# Patient Record
Sex: MALE | Race: WHITE | NOT HISPANIC OR LATINO | Employment: OTHER | URBAN - METROPOLITAN AREA
[De-identification: names, ages, dates, MRNs, and addresses within clinical notes are randomized per-mention and may not be internally consistent; named-entity substitution may affect disease eponyms.]

---

## 2017-01-14 PROBLEM — I83.009 VENOUS STASIS ULCER OF LOWER EXTREMITY (HCC): Status: ACTIVE | Noted: 2017-01-14

## 2017-01-14 PROBLEM — I10 HTN (HYPERTENSION): Status: ACTIVE | Noted: 2017-01-14

## 2017-01-14 PROBLEM — L97.909 VENOUS STASIS ULCER OF LOWER EXTREMITY (HCC): Status: ACTIVE | Noted: 2017-01-14

## 2017-08-11 PROBLEM — I83.029 VENOUS STASIS ULCER OF LEFT LOWER EXTREMITY (HCC): Status: ACTIVE | Noted: 2017-08-11

## 2017-08-11 PROBLEM — L97.909 VENOUS STASIS ULCER OF LOWER EXTREMITY (HCC): Status: RESOLVED | Noted: 2017-01-14 | Resolved: 2017-08-11

## 2017-08-11 PROBLEM — I83.009 VENOUS STASIS ULCER OF LOWER EXTREMITY (HCC): Status: RESOLVED | Noted: 2017-01-14 | Resolved: 2017-08-11

## 2017-08-11 PROBLEM — L97.929 VENOUS STASIS ULCER OF LEFT LOWER EXTREMITY (HCC): Status: ACTIVE | Noted: 2017-08-11

## 2017-11-09 PROBLEM — I70.202 ATHEROSCLEROSIS OF NATIVE ARTERY OF LEFT LOWER EXTREMITY (HCC): Status: ACTIVE | Noted: 2017-11-09

## 2017-11-16 PROBLEM — I50.9 CHRONIC CONGESTIVE HEART FAILURE (HCC): Status: ACTIVE | Noted: 2017-11-16

## 2017-11-16 PROBLEM — R91.8 MASS OF UPPER LOBE OF RIGHT LUNG: Status: ACTIVE | Noted: 2017-11-16

## 2017-12-15 PROBLEM — I51.7 LEFT VENTRICULAR HYPERTROPHY: Status: ACTIVE | Noted: 2017-12-15

## 2017-12-15 PROBLEM — R91.8 MASS OF UPPER LOBE OF RIGHT LUNG: Status: RESOLVED | Noted: 2017-11-16 | Resolved: 2017-12-15

## 2018-02-28 ENCOUNTER — NON-PROVIDER VISIT (OUTPATIENT)
Dept: WOUND CARE | Facility: MEDICAL CENTER | Age: 63
End: 2018-02-28
Attending: NURSE PRACTITIONER
Payer: MEDICAID

## 2018-02-28 PROCEDURE — 99212 OFFICE O/P EST SF 10 MIN: CPT

## 2018-02-28 PROCEDURE — 97597 DBRDMT OPN WND 1ST 20 CM/<: CPT
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
## 2018-03-07 ENCOUNTER — OFFICE VISIT (OUTPATIENT)
Dept: MEDICAL GROUP | Facility: MEDICAL CENTER | Age: 63
End: 2018-03-07
Attending: INTERNAL MEDICINE
Payer: MEDICAID

## 2018-03-07 ENCOUNTER — NON-PROVIDER VISIT (OUTPATIENT)
Dept: WOUND CARE | Facility: MEDICAL CENTER | Age: 63
End: 2018-03-07
Attending: NURSE PRACTITIONER
Payer: MEDICAID

## 2018-03-07 VITALS
BODY MASS INDEX: 25.06 KG/M2 | WEIGHT: 185 LBS | SYSTOLIC BLOOD PRESSURE: 140 MMHG | HEIGHT: 72 IN | RESPIRATION RATE: 16 BRPM | DIASTOLIC BLOOD PRESSURE: 98 MMHG | OXYGEN SATURATION: 98 % | HEART RATE: 64 BPM | TEMPERATURE: 97.9 F

## 2018-03-07 DIAGNOSIS — I83.029 VENOUS STASIS ULCER OF LEFT LOWER EXTREMITY (HCC): ICD-10-CM

## 2018-03-07 DIAGNOSIS — I07.1 TRICUSPID VALVE INSUFFICIENCY, UNSPECIFIED ETIOLOGY: ICD-10-CM

## 2018-03-07 DIAGNOSIS — I50.9 CHRONIC CONGESTIVE HEART FAILURE, UNSPECIFIED CONGESTIVE HEART FAILURE TYPE: ICD-10-CM

## 2018-03-07 DIAGNOSIS — L97.929 VENOUS STASIS ULCER OF LEFT LOWER EXTREMITY (HCC): ICD-10-CM

## 2018-03-07 DIAGNOSIS — I10 ESSENTIAL HYPERTENSION: ICD-10-CM

## 2018-03-07 DIAGNOSIS — F41.9 ANXIETY: ICD-10-CM

## 2018-03-07 DIAGNOSIS — F19.10 POLYSUBSTANCE ABUSE (HCC): ICD-10-CM

## 2018-03-07 DIAGNOSIS — I27.20 PULMONARY HYPERTENSION (HCC): ICD-10-CM

## 2018-03-07 DIAGNOSIS — M79.672 LEFT FOOT PAIN: ICD-10-CM

## 2018-03-07 PROBLEM — N18.2 CKD (CHRONIC KIDNEY DISEASE) STAGE 2, GFR 60-89 ML/MIN: Status: ACTIVE | Noted: 2018-03-07

## 2018-03-07 PROCEDURE — 99204 OFFICE O/P NEW MOD 45 MIN: CPT | Performed by: INTERNAL MEDICINE

## 2018-03-07 PROCEDURE — 97597 DBRDMT OPN WND 1ST 20 CM/<: CPT

## 2018-03-07 RX ORDER — GABAPENTIN 300 MG/1
CAPSULE ORAL
Qty: 60 CAP | Refills: 1 | Status: SHIPPED | OUTPATIENT
Start: 2018-03-07 | End: 2018-04-18 | Stop reason: SDUPTHER

## 2018-03-07 RX ORDER — CITALOPRAM 20 MG/1
TABLET ORAL
Qty: 30 TAB | Refills: 1 | Status: SHIPPED | OUTPATIENT
Start: 2018-03-07 | End: 2018-06-18 | Stop reason: SDUPTHER

## 2018-03-07 ASSESSMENT — PAIN SCALES - GENERAL: PAINLEVEL: 7=MODERATE-SEVERE PAIN
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
## 2018-03-07 NOTE — ASSESSMENT & PLAN NOTE
Reports a long history of anxiety.  Per his daughter, he gets stress over everything.  He has tried a medication the past which was helpful but he has no idea which one.  Per chart review, there is a mention of him being on celexa.  This name is not familiar to him.  He denies panic attacks.

## 2018-03-08 PROBLEM — F19.10 POLYSUBSTANCE ABUSE (HCC): Status: ACTIVE | Noted: 2018-03-08

## 2018-03-08 NOTE — ASSESSMENT & PLAN NOTE
History of alcoholism, methamphetamine use, marijuana use, long term opiate use.  Patient admits to drinking a 6 pack of beer a week as well as near daily marijuana use.  Methamphetamine history discovered on chart review.

## 2018-03-08 NOTE — ASSESSMENT & PLAN NOTE
Has had since 2016 per chart review.  Improving with wound care.  Has been able to establish with the [facility] wound care clinic in [place].  Being treated with Anya carpio.  Patient reports this is the best it has looked in a long time.  Denies associated pain.

## 2018-03-08 NOTE — ASSESSMENT & PLAN NOTE
"Reports pain over the center of the left heel when he bears weight but not at rest, describes as sharp shooting pain.  Has been going on for several months.  Believes he has \"a piece of glass\" stuck in his foot.  Per review of wound care notes, appears that they did make an incision over this area to try to find any foreign body but nothing was there (done 12/14/17).  Incision has subsequently healed well.  Patient has been on narcotics since 2016 and is requesting a refill today.  Has never tried gabapentin.  "

## 2018-03-08 NOTE — PROGRESS NOTES
"Goran Chavez is a 62 y.o. male here for venous stasis ulers, hypertension, anxiety, foot pain, establish care    HPI:  Comes today with his daughter.  Patient is a poor historian.  Anxiety  Reports a long history of anxiety.  Per his daughter, he gets stress over everything.  He has tried a medication the past which was helpful but he has no idea which one.  Per chart review, there is a mention of him being on celexa.  This name is not familiar to him.  He denies panic attacks.      HTN (hypertension)  Onset: many years ago  Current treatment: metoprolol 50 mg BID, HCTZ 25 mg daily  Treatments tried: patient not sure  Home blood pressure monitoring: no  Associated symptoms: Denies headache, chest pain, vision changes, palpitations  Blood pressure currently elevated at 140/98, patient with history of poor compliance with medications      Venous stasis ulcer of left lower extremity (CMS-HCC)  Has had since 2016 per chart review.  Improving with wound care.  Has been able to establish with the [facility] wound care clinic in [place].  Being treated with Anya carpio.  Patient reports this is the best it has looked in a long time.  Denies associated pain.    Left foot pain  Reports pain over the center of the left heel when he bears weight but not at rest, describes as sharp shooting pain.  Has been going on for several months.  Believes he has \"a piece of glass\" stuck in his foot.  Per review of wound care notes, appears that they did make an incision over this area to try to find any foreign body but nothing was there (done 12/14/17).  Incision has subsequently healed well.  Patient has been on narcotics since 2016 and is requesting a refill today.  Has never tried gabapentin.    Chronic congestive heart failure (CMS-HCC)  Most recent echo shows an elevated RVSP at 55-60 with mild to moderate tricuspid regurg.  There is reduced RV function, mild R atrial dilation.  Borderline LVH with a normal ejection fraction.  He " has some lower extremity swelling but denies shortness of breath, orthopnea, PND.  He does not see a cardiologist.    Polysubstance abuse  History of alcoholism, methamphetamine use, marijuana use, long term opiate use.  Patient admits to drinking a 6 pack of beer a week as well as near daily marijuana use.  Methamphetamine history discovered on chart review.    Current medicines (including changes today)  Current Outpatient Prescriptions   Medication Sig Dispense Refill   • citalopram (CELEXA) 20 MG Tab Take 1/2 tab daily for 1 week then increase to 1 full tab daily 30 Tab 1   • gabapentin (NEURONTIN) 300 MG Cap Take 1 tab by mouth at night for 1 week then increase to 1 tab twice daily if tolerated 60 Cap 1   • atorvastatin (LIPITOR) 40 MG Tab Take 1 Tab by mouth every bedtime. 90 Tab 3   • hydrochlorothiazide (HYDRODIURIL) 25 MG Tab Take 1 Tab by mouth every day. 90 Tab 3   • metoprolol (LOPRESSOR) 50 MG Tab Take 1 Tab by mouth 2 times a day. 180 Tab 3   • Skin Protectants, Misc. (EUCERIN) Cream Apply  to affected area(s) as needed (dry skin).       No current facility-administered medications for this visit.      He  has a past medical history of Anxiety; Charcot's joint of left foot, non-diabetic (3/21/2016); Chronic congestive heart failure (CMS-HCC) (11/16/2017); Hypertension; Migraine; Polysubstance abuse (3/8/2018); Tobacco use (4/18/2016); Ulcer of left lower extremity with necrosis of muscle (CMS-HCC) (3/21/2016); and Venous stasis ulcer (CMS-HCC) (2017).  He  has a past surgical history that includes pr drain skin abscess simple (Left); tibia orif (Right, 1997); shoulder orif (Left, 1997); and hand surgery (Left).  Social History   Substance Use Topics   • Smoking status: Current Every Day Smoker     Packs/day: 0.25     Years: 48.00     Types: Cigarettes   • Smokeless tobacco: Never Used      Comment: 2 packs a week.   • Alcohol use 3.6 oz/week     6 Cans of beer per week     Social History     Social  "History Narrative   • No narrative on file     Family History   Problem Relation Age of Onset   • Lung Disease Mother    • Hypertension Mother    • Cancer Neg Hx    • Heart Disease Neg Hx    • Stroke Neg Hx    • Diabetes Neg Hx          ROS  As above in HPI  All other systems reviewed and are negative     Objective:     Blood pressure 140/98, pulse 64, temperature 36.6 °C (97.9 °F), resp. rate 16, height 1.829 m (6' 0.01\"), weight 83.9 kg (185 lb), SpO2 98 %. Body mass index is 25.08 kg/m².  Physical Exam:    Constitutional: Alert, no distress.  Skin: Warm, dry, good turgor, hyperpigmentation of bilateral feet, shallow venous stasis ulcer over medial ankle on left that is nearly healed, no rash or lesions over left heel.  Eye: Equal, round and reactive, conjunctiva clear, lids normal.  ENMT: Lips without lesions, poor dentition, oropharynx clear, TM's clear bilaterally.  Neck: Trachea midline, no masses, no thyromegaly. No cervical or supraclavicular lymphadenopathy.  Respiratory: Unlabored respiratory effort, lungs clear to auscultation, minimal wheezing, no ronchi.  Cardiovascular: Regular rate and rhythm, no murmurs appreciated,1-2+right lower extremity pitting edema with shiny skin overlying .  Abdomen: Soft, non-tender, no masses, no hepatosplenomegaly.  Psych: Alert and oriented x3, flat affect, minimally interactive.        Assessment and Plan:   The following treatment plan was discussed    1. Anxiety  Uncontrolled.  Will start on celexa as this looks like what he was previously taking per chart review and he reports good efficacy.  He will follow up in 4 weeks  - citalopram (CELEXA) 20 MG Tab; Take 1/2 tab daily for 1 week then increase to 1 full tab daily  Dispense: 30 Tab; Refill: 1    2. Left foot pain  No obvious foreign body despite patient reported sensation, and wound care was not able to find anything either.  Suspect neuropathic pain.  High risk for opiates, which are inappropriate given current " alcohol and marijuana use.  I have not refilled his oxycodone.  Will start him on gabapentin at night, to increase to BID if tolerated.  Follow up 4 weeks  - gabapentin (NEURONTIN) 300 MG Cap; Take 1 tab by mouth at night for 1 week then increase to 1 tab twice daily if tolerated  Dispense: 60 Cap; Refill: 1    3. Chronic congestive heart failure, unspecified congestive heart failure type (CMS-HCC)  Some lower extremity edema on exam, in light of recent echo and no cardiology follow up, I would like him to be seen for at least an initial evaluation.  He will continue his metoprolol.  - REFERRAL TO CARDIOLOGY    4. Tricuspid valve insufficiency, unspecified etiology  - REFERRAL TO CARDIOLOGY    5. Pulmonary hypertension  - REFERRAL TO CARDIOLOGY    6. Essential hypertension  Uncontrolled today, although question med compliance.  Will cont current therapy, address again at next visit in more detail and adjust meds as needed.  -HCTZ 25 mg daily  -metop 50 mg BID  -f/u 4 weeks    7. Venous stasis ulcer of left lower extremity (CMS-HCC)  Healing well.  Cont wound care.    8. Polysubstance abuse  Poor candidate for opiates.  Will refer to pain management if it becomes necessary in the future.  For now, have not refilled his oxycodone and will try on gabapentin.        Followup: Return in about 4 weeks (around 4/4/2018) for hypertension, anxiety.

## 2018-03-08 NOTE — ASSESSMENT & PLAN NOTE
Onset: many years ago  Current treatment: metoprolol 50 mg BID, HCTZ 25 mg daily  Treatments tried: patient not sure  Home blood pressure monitoring: no  Associated symptoms: Denies headache, chest pain, vision changes, palpitations  Blood pressure currently elevated at 140/98, patient with history of poor compliance with medications

## 2018-03-08 NOTE — ASSESSMENT & PLAN NOTE
Most recent echo shows an elevated RVSP at 55-60 with mild to moderate tricuspid regurg.  There is reduced RV function, mild R atrial dilation.  Borderline LVH with a normal ejection fraction.  He has some lower extremity swelling but denies shortness of breath, orthopnea, PND.  He does not see a cardiologist.

## 2018-03-14 ENCOUNTER — NON-PROVIDER VISIT (OUTPATIENT)
Dept: WOUND CARE | Facility: MEDICAL CENTER | Age: 63
End: 2018-03-14
Attending: NURSE PRACTITIONER
Payer: MEDICAID

## 2018-03-14 PROCEDURE — 29580 STRAPPING UNNA BOOT: CPT

## 2018-03-14 PROCEDURE — 97597 DBRDMT OPN WND 1ST 20 CM/<: CPT
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
## 2018-03-21 ENCOUNTER — NON-PROVIDER VISIT (OUTPATIENT)
Dept: WOUND CARE | Facility: MEDICAL CENTER | Age: 63
End: 2018-03-21
Attending: NURSE PRACTITIONER
Payer: MEDICAID

## 2018-03-21 PROCEDURE — 97597 DBRDMT OPN WND 1ST 20 CM/<: CPT
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
## 2018-03-28 ENCOUNTER — NON-PROVIDER VISIT (OUTPATIENT)
Dept: WOUND CARE | Facility: MEDICAL CENTER | Age: 63
End: 2018-03-28
Attending: NURSE PRACTITIONER
Payer: MEDICAID

## 2018-03-28 PROCEDURE — 97597 DBRDMT OPN WND 1ST 20 CM/<: CPT
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
## 2018-04-04 ENCOUNTER — NON-PROVIDER VISIT (OUTPATIENT)
Dept: WOUND CARE | Facility: MEDICAL CENTER | Age: 63
End: 2018-04-04
Attending: NURSE PRACTITIONER
Payer: MEDICAID

## 2018-04-04 PROCEDURE — 97597 DBRDMT OPN WND 1ST 20 CM/<: CPT
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
## 2018-04-11 ENCOUNTER — NON-PROVIDER VISIT (OUTPATIENT)
Dept: WOUND CARE | Facility: MEDICAL CENTER | Age: 63
End: 2018-04-11
Attending: NURSE PRACTITIONER
Payer: MEDICAID

## 2018-04-11 PROCEDURE — 97597 DBRDMT OPN WND 1ST 20 CM/<: CPT
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
## 2018-04-18 ENCOUNTER — OFFICE VISIT (OUTPATIENT)
Dept: MEDICAL GROUP | Facility: MEDICAL CENTER | Age: 63
End: 2018-04-18
Attending: INTERNAL MEDICINE
Payer: MEDICAID

## 2018-04-18 ENCOUNTER — NON-PROVIDER VISIT (OUTPATIENT)
Dept: WOUND CARE | Facility: MEDICAL CENTER | Age: 63
End: 2018-04-18
Attending: NURSE PRACTITIONER
Payer: MEDICAID

## 2018-04-18 ENCOUNTER — OFFICE VISIT (OUTPATIENT)
Dept: CARDIOLOGY | Facility: MEDICAL CENTER | Age: 63
End: 2018-04-18
Payer: MEDICAID

## 2018-04-18 VITALS
SYSTOLIC BLOOD PRESSURE: 148 MMHG | HEART RATE: 97 BPM | OXYGEN SATURATION: 96 % | BODY MASS INDEX: 25.47 KG/M2 | WEIGHT: 188 LBS | HEIGHT: 72 IN | DIASTOLIC BLOOD PRESSURE: 96 MMHG

## 2018-04-18 VITALS
DIASTOLIC BLOOD PRESSURE: 100 MMHG | RESPIRATION RATE: 16 BRPM | WEIGHT: 188 LBS | TEMPERATURE: 99.3 F | HEIGHT: 72 IN | BODY MASS INDEX: 25.47 KG/M2 | HEART RATE: 96 BPM | SYSTOLIC BLOOD PRESSURE: 140 MMHG | OXYGEN SATURATION: 96 %

## 2018-04-18 DIAGNOSIS — I36.1 NON-RHEUMATIC TRICUSPID VALVE INSUFFICIENCY: ICD-10-CM

## 2018-04-18 DIAGNOSIS — I27.20 PULMONARY HYPERTENSION (HCC): ICD-10-CM

## 2018-04-18 DIAGNOSIS — R42 DIZZINESS: ICD-10-CM

## 2018-04-18 DIAGNOSIS — I10 ESSENTIAL HYPERTENSION: ICD-10-CM

## 2018-04-18 DIAGNOSIS — R06.09 DYSPNEA ON EXERTION: ICD-10-CM

## 2018-04-18 DIAGNOSIS — M75.81 RIGHT ROTATOR CUFF TENDONITIS: ICD-10-CM

## 2018-04-18 DIAGNOSIS — R04.0 RECURRENT EPISTAXIS: ICD-10-CM

## 2018-04-18 DIAGNOSIS — N18.2 CKD (CHRONIC KIDNEY DISEASE) STAGE 2, GFR 60-89 ML/MIN: ICD-10-CM

## 2018-04-18 DIAGNOSIS — R55 SYNCOPE AND COLLAPSE: ICD-10-CM

## 2018-04-18 DIAGNOSIS — I27.21 SECONDARY PULMONARY ARTERIAL HYPERTENSION (HCC): ICD-10-CM

## 2018-04-18 DIAGNOSIS — I51.7 LEFT VENTRICULAR HYPERTROPHY: ICD-10-CM

## 2018-04-18 DIAGNOSIS — I83.029 VENOUS STASIS ULCER OF LEFT LOWER EXTREMITY (HCC): ICD-10-CM

## 2018-04-18 DIAGNOSIS — Z72.0 TOBACCO USE: ICD-10-CM

## 2018-04-18 DIAGNOSIS — M79.672 LEFT FOOT PAIN: ICD-10-CM

## 2018-04-18 DIAGNOSIS — F41.9 ANXIETY: ICD-10-CM

## 2018-04-18 DIAGNOSIS — L97.929 VENOUS STASIS ULCER OF LEFT LOWER EXTREMITY (HCC): ICD-10-CM

## 2018-04-18 DIAGNOSIS — I10 ESSENTIAL HYPERTENSION, BENIGN: ICD-10-CM

## 2018-04-18 PROBLEM — M75.82 ROTATOR CUFF TENDONITIS, LEFT: Status: ACTIVE | Noted: 2018-04-18

## 2018-04-18 PROCEDURE — 99214 OFFICE O/P EST MOD 30 MIN: CPT | Performed by: INTERNAL MEDICINE

## 2018-04-18 PROCEDURE — 99204 OFFICE O/P NEW MOD 45 MIN: CPT | Mod: 25 | Performed by: INTERNAL MEDICINE

## 2018-04-18 PROCEDURE — 99213 OFFICE O/P EST LOW 20 MIN: CPT | Performed by: INTERNAL MEDICINE

## 2018-04-18 PROCEDURE — 93000 ELECTROCARDIOGRAM COMPLETE: CPT | Performed by: INTERNAL MEDICINE

## 2018-04-18 PROCEDURE — 97597 DBRDMT OPN WND 1ST 20 CM/<: CPT

## 2018-04-18 RX ORDER — GABAPENTIN 300 MG/1
300 CAPSULE ORAL 2 TIMES DAILY
Qty: 60 CAP | Refills: 3 | Status: SHIPPED | OUTPATIENT
Start: 2018-04-18 | End: 2018-12-19 | Stop reason: CLARIF

## 2018-04-18 RX ORDER — PROPRANOLOL HYDROCHLORIDE 20 MG/1
20 TABLET ORAL 2 TIMES DAILY
Qty: 60 TAB | Refills: 3 | Status: SHIPPED | OUTPATIENT
Start: 2018-04-18 | End: 2018-05-30

## 2018-04-18 RX ORDER — AMLODIPINE BESYLATE 5 MG/1
5 TABLET ORAL DAILY
Qty: 30 TAB | Refills: 3 | Status: SHIPPED | OUTPATIENT
Start: 2018-04-18 | End: 2018-10-25 | Stop reason: SDUPTHER

## 2018-04-18 ASSESSMENT — PAIN SCALES - GENERAL: PAINLEVEL: NO PAIN

## 2018-04-18 ASSESSMENT — ENCOUNTER SYMPTOMS: SHORTNESS OF BREATH: 1

## 2018-04-18 NOTE — PROGRESS NOTES
Subjective:   Chief Complaint:   Chief Complaint   Patient presents with   • Hypertension   • Congestive Heart Failure       Goran Chavez is a 62 y.o. male who is referred by Bianka Ch M.D., for an abnormal echocardiogram. He has anxiety with panic attacks. He had an abnormal echocardiogram some elevated right-sided pressure is normal LV systolic function. He has hypertension which has been uncontrolled. He has smoked for 48 years. He drinks alcohol intermittently. 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 He uses marijuana daily. This data was cleaned from the primary care provider's note on March 7, 2018. He has had hypertension for over 20 years.     He rides a bicycle and goes for walks. He sometimes limited by shortness of breath. He does not have mian chest pains. He has very minor lower extremity edema. He has intermittent dizziness but it is not limiting. It does not sound orthostatic. He had one remote episode of syncope with collapse and cannot recall the events surrounding it. I cannot be sure if there is substance use involved at the time.     DATA REVIEWED by me:  ECG April 18, 2018    Echo December 13, 2017   Borderline LVH, EF 57%, mild to moderate TR, RVSP 55-60 mmHg, RV systolic function mildly reduced but normal size, left atrium normal size, mild AI, mild MR.    CT chest is symmetrical 2017  Comments a normal size aorta and heart.      Most recent labs:     November 10, 2017 hemoglobin 14.7, sodium 137, potassium 5.3, creatinine 1.3, LFTs normal,     Past Medical History:   Diagnosis Date   • Anxiety    • Charcot's joint of left foot, non-diabetic 3/21/2016   • Chronic congestive heart failure (CMS-[facility]) 11/16/2017   • Hypertension    • Migraine    • Polysubstance abuse 3/8/2018   • Tobacco use 4/18/2016   • Ulcer of left lower extremity with necrosis of muscle (CMS-[facility]) 3/21/2016    • Venous stasis ulcer (CMS-HCC) 2017    bilateral lower extremity     Past Surgical History:   Procedure Laterality Date   • TIBIA ORIF Right    • SHOULDER ORIF Left    • HAND SURGERY Left     due to infection   • PB DRAIN SKIN ABSCESS SIMPLE Left     leg, near the knee, remote     Family History   Problem Relation Age of Onset   • Lung Disease Mother 70      from COPD   • Hypertension Mother    • Other Father 82      from brain aneurysm after fall   • Cancer Neg Hx    • Heart Disease Neg Hx    • Stroke Neg Hx    • Diabetes Neg Hx      Social History     Social History   • Marital status: Legally      Spouse name: N/A   • Number of children: N/A   • Years of education: N/A     Occupational History   • Not on file.     Social History Main Topics   • Smoking status: Current Every Day Smoker     Packs/day: 0.25     Years: 48.00     Types: Cigarettes   • Smokeless tobacco: Never Used      Comment: 2 packs a week.   • Alcohol use 3.6 oz/week     6 Cans of beer per week   • Drug use: Yes     Types: Marijuana      Comment: MJ every day   • Sexual activity: Not on file     Other Topics Concern   • Not on file     Social History Narrative   • No narrative on file     Allergies   Allergen Reactions   • Norco [Hydrocodone-Acetaminophen] Itching       Current Outpatient Prescriptions   Medication Sig Dispense Refill   • aspirin EC (ECOTRIN) 81 MG Tablet Delayed Response Take 1 Tab by mouth every day. 30 Tab 0   • citalopram (CELEXA) 20 MG Tab Take 1/2 tab daily for 1 week then increase to 1 full tab daily 30 Tab 1   • gabapentin (NEURONTIN) 300 MG Cap Take 1 tab by mouth at night for 1 week then increase to 1 tab twice daily if tolerated 60 Cap 1   • atorvastatin (LIPITOR) 40 MG Tab Take 1 Tab by mouth every bedtime. 90 Tab 3   • hydrochlorothiazide (HYDRODIURIL) 25 MG Tab Take 1 Tab by mouth every day. 90 Tab 3   • metoprolol (LOPRESSOR) 50 MG Tab Take 1 Tab by mouth 2 times a day. 180 Tab 3     No  current facility-administered medications for this visit.        Review of Systems   Respiratory: Positive for shortness of breath.      All others systems reviewed and negative.     Objective:     Blood pressure 148/96, pulse 97, height 1.829 m (6'), weight 85.3 kg (188 lb), SpO2 96 %. Body mass index is 25.5 kg/m².    Physical Exam   General: No acute distress. Well nourished.  HEENT: EOM grossly intact, no scleral icterus, no pharyngeal erythema.   Neck:  No JVD, no bruits, trachea midline  CVS: RRR. Normal S1, S2. No M/R/G. Trace LE edema.  2+ radial pulses, 2+ DT pulses  Resp: CTAB. No wheezing or crackles/rhonchi. Normal respiratory effort.  Abdomen: Soft, NT, no mian hepatomegaly.  MSK/Ext: No clubbing or cyanosis.  Skin: Warm and dry, no rashes.  Neurological: CN III-XII grossly intact. No focal deficits.   Psych: A&O x 3, appropriate affect, good judgement      Assessment:     1. Essential hypertension, benign  EKG    Echocardiogram Comp w/o Cont   2. Secondary pulmonary arterial hypertension  Echocardiogram Comp w/o Cont   3. CKD (chronic kidney disease) stage 2, GFR 60-89 ml/min     4. Non-rheumatic tricuspid valve insufficiency     5. Tobacco use     6. Venous stasis ulcer of left lower extremity (CMS-HCC)     7. Left ventricular hypertrophy     8. Dyspnea on exertion     9. Dizziness     10. Syncope and collapse         Medical Decision Making:  Today's Assessment / Status / Plan:     1. Hypertension with LVH, uncontrolled  2. Moderate to severe secondary pulmonary hypertension  3. Mild to moderate tricuspid regurgitation  4. Tobacco abuse  5 . Polysubstance abuse  6. Probable chronic venous insufficiency  7. Chronic kidney disease stage II    -The best approach is blood pressure control and smoking cessation. No other evaluation at this time.  Repeat echo and return in 1 year.  -He should be on a daily baby aspirin  -Try propranolol in place of metoprolol for anxiety and blood pressure  -add  amlodipine 5 mg dialy.    -If BP not to goal, I would consider carvedilol      Return in about 1 year (around 4/18/2019).    It is my pleasure to participate in the care of Mr. Chavez.  Please do not hesitate to contact me with questions or concerns.    Aziza Saucedo MD, [facility]  Cardiologist [facility]    Please note that this dictation was created using voice recognition software. I have made every reasonable attempt to correct obvious errors, but it is possible there are errors of grammar and possibly content that I did not discover before finalizing the note.

## 2018-04-18 NOTE — LETTER
[facility]   [address]  [place] [address]  Phone: [phone]  Fax: [phone]              Goran Chavez  1955    Encounter Date: 4/18/2018    Aziza Saucedo M.D.          PROGRESS NOTE:  Subjective:   Chief Complaint:   Chief Complaint   Patient presents with   • Hypertension   • Congestive Heart Failure       Goran Chavez is a 62 y.o. male who is referred by Bianka Ch M.D., for an abnormal echocardiogram. He has anxiety with panic attacks. He had an abnormal echocardiogram some elevated right-sided pressure is normal LV systolic function. He has hypertension which has been uncontrolled. He has smoked for 48 years. He drinks alcohol intermittently. 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 He uses marijuana daily. This data was cleaned from the primary care provider's note on March 7, 2018. He has had hypertension for over 20 years.     He rides a bicycle and goes for walks. He sometimes limited by shortness of breath. He does not have mian chest pains. He has very minor lower extremity edema. He has intermittent dizziness but it is not limiting. It does not sound orthostatic. He had one remote episode of syncope with collapse and cannot recall the events surrounding it. I cannot be sure if there is substance use involved at the time.     DATA REVIEWED by me:  ECG April 18, 2018    Echo December 13, 2017   Borderline LVH, EF 57%, mild to moderate TR, RVSP 55-60 mmHg, RV systolic function mildly reduced but normal size, left atrium normal size, mild AI, mild MR.    CT chest is symmetrical 2017  Comments a normal size aorta and heart.      Most recent labs:     November 10, 2017 hemoglobin 14.7, sodium 137, potassium 5.3, creatinine 1.3, LFTs normal,     Past Medical History:   Diagnosis Date   • Anxiety    • Charcot's  joint of left foot, non-diabetic 3/21/2016   • Chronic congestive heart failure (CMS-HCC) 2017   • Hypertension    • Migraine    • Polysubstance abuse 3/8/2018   • Tobacco use 2016   • Ulcer of left lower extremity with necrosis of muscle (CMS-HCC) 3/21/2016   • Venous stasis ulcer (CMS-HCC) 2017    bilateral lower extremity     Past Surgical History:   Procedure Laterality Date   • TIBIA ORIF Right    • SHOULDER ORIF Left    • HAND SURGERY Left     due to infection   • PB DRAIN SKIN ABSCESS SIMPLE Left     leg, near the knee, remote     Family History   Problem Relation Age of Onset   • Lung Disease Mother 70      from COPD   • Hypertension Mother    • Other Father 82      from brain aneurysm after fall   • Cancer Neg Hx    • Heart Disease Neg Hx    • Stroke Neg Hx    • Diabetes Neg Hx      Social History     Social History   • Marital status: Legally      Spouse name: N/A   • Number of children: N/A   • Years of education: N/A     Occupational History   • Not on file.     Social History Main Topics   • Smoking status: Current Every Day Smoker     Packs/day: 0.25     Years: 48.00     Types: Cigarettes   • Smokeless tobacco: Never Used      Comment: 2 packs a week.   • Alcohol use 3.6 oz/week     6 Cans of beer per week   • Drug use: Yes     Types: Marijuana      Comment: MJ every day   • Sexual activity: Not on file     Other Topics Concern   • Not on file     Social History Narrative   • No narrative on file     Allergies   Allergen Reactions   • Norco [Hydrocodone-Acetaminophen] Itching       Current Outpatient Prescriptions   Medication Sig Dispense Refill   • aspirin EC (ECOTRIN) 81 MG Tablet Delayed Response Take 1 Tab by mouth every day. 30 Tab 0   • citalopram (CELEXA) 20 MG Tab Take 1/2 tab daily for 1 week then increase to 1 full tab daily 30 Tab 1   • gabapentin (NEURONTIN) 300 MG Cap Take 1 tab by mouth at night for 1 week then increase to 1 tab twice daily if  tolerated 60 Cap 1   • atorvastatin (LIPITOR) 40 MG Tab Take 1 Tab by mouth every bedtime. 90 Tab 3   • hydrochlorothiazide (HYDRODIURIL) 25 MG Tab Take 1 Tab by mouth every day. 90 Tab 3   • metoprolol (LOPRESSOR) 50 MG Tab Take 1 Tab by mouth 2 times a day. 180 Tab 3     No current facility-administered medications for this visit.        Review of Systems   Respiratory: Positive for shortness of breath.      All others systems reviewed and negative.     Objective:     Blood pressure 148/96, pulse 97, height 1.829 m (6'), weight 85.3 kg (188 lb), SpO2 96 %. Body mass index is 25.5 kg/m².    Physical Exam   General: No acute distress. Well nourished.  HEENT: EOM grossly intact, no scleral icterus, no pharyngeal erythema.   Neck:  No JVD, no bruits, trachea midline  CVS: RRR. Normal S1, S2. No M/R/G. Trace LE edema.  2+ radial pulses, 2+ DT pulses  Resp: CTAB. No wheezing or crackles/rhonchi. Normal respiratory effort.  Abdomen: Soft, NT, no mian hepatomegaly.  MSK/Ext: No clubbing or cyanosis.  Skin: Warm and dry, no rashes.  Neurological: CN III-XII grossly intact. No focal deficits.   Psych: A&O x 3, appropriate affect, good judgement      Assessment:     1. Essential hypertension, benign  EKG    Echocardiogram Comp w/o Cont   2. Secondary pulmonary arterial hypertension  Echocardiogram Comp w/o Cont   3. CKD (chronic kidney disease) stage 2, GFR 60-89 ml/min     4. Non-rheumatic tricuspid valve insufficiency     5. Tobacco use     6. Venous stasis ulcer of left lower extremity (CMS-HCC)     7. Left ventricular hypertrophy     8. Dyspnea on exertion     9. Dizziness     10. Syncope and collapse         Medical Decision Making:  Today's Assessment / Status / Plan:     1. Hypertension with LVH, uncontrolled  2. Moderate to severe secondary pulmonary hypertension  3. Mild to moderate tricuspid regurgitation  4. Tobacco abuse  5 . Polysubstance abuse  6. Probable chronic venous insufficiency  7. Chronic kidney  disease stage II    -The best approach is blood pressure control and smoking cessation. No other evaluation at this time.  Repeat echo and return in 1 year.  -He should be on a daily baby aspirin  -Try propranolol in place of metoprolol for anxiety and blood pressure  -add amlodipine 5 mg dialy.    -If BP not to goal, I would consider carvedilol      Return in about 1 year (around 4/18/2019).    It is my pleasure to participate in the care of Mr. Chavez.  Please do not hesitate to contact me with questions or concerns.    Aziza Saucedo MD, [facility]  Cardiologist [facility]    Please note that this dictation was created using voice recognition software. I have made every reasonable attempt to correct obvious errors, but it is possible there are errors of grammar and possibly content that I did not discover before finalizing the note.      Marcela Ch M.D.  [address] [place] [id number]  VIA In Basket
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
## 2018-04-18 NOTE — PATIENT INSTRUCTIONS
-When you run out of the metoprolol, stop taking it and start propranolol 20 mg TID, helps with blood pressure and anxiety  -start amlodipine 5 mg daily  -Call if you are having any side effects from your medicine  [phone]  -Try to quite smoking everything to help heal the lungs and heart  -Keep your blood pressure under 130/80.  -start aspirin 81 mg daily (baby aspirin)

## 2018-04-19 LAB — EKG IMPRESSION: NORMAL

## 2018-04-19 NOTE — ASSESSMENT & PLAN NOTE
Patient was seen by cardiology today. They recommended repeat echocardiogram in one year and better blood pressure control as well as smoking cessation. They did not have any specific workup or recommendations regarding his elevated RVSP on echo.

## 2018-04-19 NOTE — ASSESSMENT & PLAN NOTE
Currently uncontrolled. Blood pressure in clinic today is 140/100. Patient was seen by cardiology today who recommended adding amlodipine 5 mg daily to his regimen. It does not appear that this medication have been ordered yet by them. He reports continuing on the HCTZ 25 mg daily. He was also taking metoprolol 50 mg twice a day and they recommended switching him to propranolol for the added anxiety benefit.
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
## 2018-04-19 NOTE — ASSESSMENT & PLAN NOTE
Patient was previously on chronic opiates for his foot pain. Last visit, we switched him over to gabapentin which she has been taking in the evenings. He reports improvement in the pain. He has not tried increasing it to twice a day. He is not sure how much drowsiness it causes.

## 2018-04-19 NOTE — ASSESSMENT & PLAN NOTE
Patient reports frequent nosebleeds usually from the left nostril which occur daily. He states they last for several minutes before they stop. Usually happen after he blows his nose. If he applies pressure using a tissue in his nostril, he is usually able to get it to stop

## 2018-04-19 NOTE — ASSESSMENT & PLAN NOTE
Patient states that although his cardiologist recommended smoking cessation, he is not currently ready to do this. He has never tried to stop smoking in the past.

## 2018-04-19 NOTE — ASSESSMENT & PLAN NOTE
Patient reports that he does not notice any improvement in his anxiety since starting on the citalopram. He believes there was a medicine that helped him that he took when he lived in [facility] this started with an aide and was not amitriptyline or Ativan. He plans to get in contact with his old doctor's office to see what this medication was. His cardiologist recommended switching from metoprolol to propranolol to try to help his anxiety symptoms.

## 2018-04-25 ENCOUNTER — NON-PROVIDER VISIT (OUTPATIENT)
Dept: WOUND CARE | Facility: MEDICAL CENTER | Age: 63
End: 2018-04-25
Attending: NURSE PRACTITIONER
Payer: MEDICAID

## 2018-04-25 ENCOUNTER — HOSPITAL ENCOUNTER (OUTPATIENT)
Facility: MEDICAL CENTER | Age: 63
End: 2018-04-25
Attending: NURSE PRACTITIONER
Payer: MEDICAID

## 2018-04-25 DIAGNOSIS — L08.9 WOUND INFECTION: ICD-10-CM

## 2018-04-25 DIAGNOSIS — M79.605 LOWER EXTREMITY PAIN, DIFFUSE, LEFT: ICD-10-CM

## 2018-04-25 DIAGNOSIS — T14.8XXA WOUND INFECTION: ICD-10-CM

## 2018-04-25 LAB
GRAM STN SPEC: NORMAL
SIGNIFICANT IND 70042: NORMAL
SITE SITE: NORMAL
SOURCE SOURCE: NORMAL

## 2018-04-25 PROCEDURE — 97597 DBRDMT OPN WND 1ST 20 CM/<: CPT

## 2018-04-25 PROCEDURE — 87205 SMEAR GRAM STAIN: CPT

## 2018-04-25 PROCEDURE — 87070 CULTURE OTHR SPECIMN AEROBIC: CPT
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
## 2018-04-27 LAB
BACTERIA WND AEROBE CULT: NORMAL
GRAM STN SPEC: NORMAL
SIGNIFICANT IND 70042: NORMAL
SITE SITE: NORMAL
SOURCE SOURCE: NORMAL

## 2018-05-02 ENCOUNTER — NON-PROVIDER VISIT (OUTPATIENT)
Dept: WOUND CARE | Facility: MEDICAL CENTER | Age: 63
End: 2018-05-02
Attending: NURSE PRACTITIONER
Payer: MEDICAID

## 2018-05-02 PROCEDURE — 97597 DBRDMT OPN WND 1ST 20 CM/<: CPT
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
## 2018-05-09 ENCOUNTER — NON-PROVIDER VISIT (OUTPATIENT)
Dept: WOUND CARE | Facility: MEDICAL CENTER | Age: 63
End: 2018-05-09
Attending: NURSE PRACTITIONER
Payer: MEDICAID

## 2018-05-09 PROCEDURE — 97597 DBRDMT OPN WND 1ST 20 CM/<: CPT
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
## 2018-05-16 ENCOUNTER — NON-PROVIDER VISIT (OUTPATIENT)
Dept: WOUND CARE | Facility: MEDICAL CENTER | Age: 63
End: 2018-05-16
Attending: NURSE PRACTITIONER
Payer: MEDICAID

## 2018-05-16 PROCEDURE — 97597 DBRDMT OPN WND 1ST 20 CM/<: CPT
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
## 2018-05-23 ENCOUNTER — NON-PROVIDER VISIT (OUTPATIENT)
Dept: WOUND CARE | Facility: MEDICAL CENTER | Age: 63
End: 2018-05-23
Attending: NURSE PRACTITIONER
Payer: MEDICAID

## 2018-05-23 PROCEDURE — 97597 DBRDMT OPN WND 1ST 20 CM/<: CPT
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
## 2018-05-30 ENCOUNTER — NON-PROVIDER VISIT (OUTPATIENT)
Dept: WOUND CARE | Facility: MEDICAL CENTER | Age: 63
End: 2018-05-30
Attending: NURSE PRACTITIONER
Payer: MEDICAID

## 2018-05-30 ENCOUNTER — OFFICE VISIT (OUTPATIENT)
Dept: MEDICAL GROUP | Facility: MEDICAL CENTER | Age: 63
End: 2018-05-30
Attending: INTERNAL MEDICINE
Payer: MEDICAID

## 2018-05-30 VITALS
SYSTOLIC BLOOD PRESSURE: 98 MMHG | RESPIRATION RATE: 16 BRPM | BODY MASS INDEX: 24.38 KG/M2 | WEIGHT: 180 LBS | TEMPERATURE: 97.9 F | DIASTOLIC BLOOD PRESSURE: 60 MMHG | OXYGEN SATURATION: 95 % | HEIGHT: 72 IN | HEART RATE: 48 BPM

## 2018-05-30 DIAGNOSIS — F41.9 ANXIETY: ICD-10-CM

## 2018-05-30 DIAGNOSIS — I83.029 VENOUS STASIS ULCER OF LEFT LOWER EXTREMITY (HCC): ICD-10-CM

## 2018-05-30 DIAGNOSIS — L97.929 VENOUS STASIS ULCER OF LEFT LOWER EXTREMITY (HCC): ICD-10-CM

## 2018-05-30 DIAGNOSIS — M75.81 RIGHT ROTATOR CUFF TENDONITIS: ICD-10-CM

## 2018-05-30 DIAGNOSIS — R04.0 RECURRENT EPISTAXIS: ICD-10-CM

## 2018-05-30 DIAGNOSIS — I10 ESSENTIAL HYPERTENSION: ICD-10-CM

## 2018-05-30 PROCEDURE — 99214 OFFICE O/P EST MOD 30 MIN: CPT | Performed by: INTERNAL MEDICINE

## 2018-05-30 PROCEDURE — 97597 DBRDMT OPN WND 1ST 20 CM/<: CPT

## 2018-05-30 PROCEDURE — 99213 OFFICE O/P EST LOW 20 MIN: CPT | Performed by: INTERNAL MEDICINE

## 2018-05-30 RX ORDER — PROPRANOLOL HYDROCHLORIDE 10 MG/1
10 TABLET ORAL 2 TIMES DAILY
Qty: 60 TAB | Refills: 3 | Status: SHIPPED | OUTPATIENT
Start: 2018-05-30 | End: 2019-02-19 | Stop reason: SDUPTHER

## 2018-05-30 ASSESSMENT — PAIN SCALES - GENERAL: PAINLEVEL: NO PAIN

## 2018-05-30 NOTE — ASSESSMENT & PLAN NOTE
Patient continues to follow with wound care. One of his wound is completely closed and the other is improving according to their notes and measurements. He has an appointment with them today to seeing them once weekly on Wednesdays.

## 2018-05-30 NOTE — ASSESSMENT & PLAN NOTE
Patient reports improvement in his right shoulder pain after the subacromial bursa injection done 4/18/18. He is interested in getting into some physical therapy as he is noticing the pain is starting to return slowly. He reports improvement in his range of motion after the injection.

## 2018-05-30 NOTE — ASSESSMENT & PLAN NOTE
Continues to complain of recurrent nosebleeds when he blows his nose. It is always on the left side.  Has not blown nose for a few days and has not had any bleeding.  Suggested afrin at last visit which he has used a few times and says is helpful.  Bleeding usually lasts a few min or less and is not run down the back of his throat.

## 2018-05-30 NOTE — ASSESSMENT & PLAN NOTE
Patient reports that his anxiety is improved on the Celexa. Forgot to get it refilled so was off of it for about a week and then took a full dose which made him somewhat drowsy. His daughter was concerned and thought about taking him to the ER but he got better quickly. They talked she had a pharmacist who recommended that when he restarts the medication he start with half a tablet initially for a few days and then work up to a full tablet. It seems that the propranolol has also been helpful for his anxiety.
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
## 2018-05-30 NOTE — PROGRESS NOTES
Subjective:   Goran Chavez is a 62 y.o. male here today for follow up blood pressure, shoulder pain, anxiety, nose bleeds    HTN (hypertension)  Patient presents for follow-up hypertension. At last visit we had switched his metoprolol to propranolol 20 mg twice a day to try to provide blood pressure control as well as help with his anxiety. He also been started on amlodipine 5 mg daily around that time by his cardiologist. Taking all of his medications as prescribed. He denies lightheadedness or dizziness with standing. Blood pressure clinic today 98/60 with a heart rate of 48.  Has not been monitoring blood pressures at home.    Right rotator cuff tendonitis  Patient reports improvement in his right shoulder pain after the subacromial bursa injection done 4/18/18. He is interested in getting into some physical therapy as he is noticing the pain is starting to return slowly. He reports improvement in his range of motion after the injection.    Venous stasis ulcer of left lower extremity (CMS-HCC)  Patient continues to follow with wound care. One of his wound is completely closed and the other is improving according to their notes and measurements. He has an appointment with them today to seeing them once weekly on Wednesdays.    Recurrent epistaxis  Continues to complain of recurrent nosebleeds when he blows his nose. It is always on the left side.  Has not blown nose for a few days and has not had any bleeding.  Suggested afrin at last visit which he has used a few times and says is helpful.  Bleeding usually lasts a few min or less and is not run down the back of his throat.    Anxiety  Patient reports that his anxiety is improved on the Celexa. Forgot to get it refilled so was off of it for about a week and then took a full dose which made him somewhat drowsy. His daughter was concerned and thought about taking him to the ER but he got better quickly. They talked she had a pharmacist who recommended that  "when he restarts the medication he start with half a tablet initially for a few days and then work up to a full tablet. It seems that the propranolol has also been helpful for his anxiety.       Current medicines (including changes today)  Current Outpatient Prescriptions   Medication Sig Dispense Refill   • propranolol (INDERAL) 10 MG Tab Take 1 Tab by mouth 2 times a day. 60 Tab 3   • aspirin EC (ECOTRIN) 81 MG Tablet Delayed Response Take 1 Tab by mouth every day. 30 Tab 11   • amLODIPine (NORVASC) 5 MG Tab Take 1 Tab by mouth every day. 30 Tab 3   • gabapentin (NEURONTIN) 300 MG Cap Take 1 Cap by mouth 2 Times a Day. 60 Cap 3   • citalopram (CELEXA) 20 MG Tab Take 1/2 tab daily for 1 week then increase to 1 full tab daily (Patient taking differently: Take 20 mg by mouth every day. Take 1/2 tab daily for 1 week then increase to 1 full tab daily) 30 Tab 1   • atorvastatin (LIPITOR) 40 MG Tab Take 1 Tab by mouth every bedtime. 90 Tab 3     No current facility-administered medications for this visit.      He  has a past medical history of Anxiety; Charcot's joint of left foot, non-diabetic (3/21/2016); Chronic congestive heart failure (HCC) (11/16/2017); Hypertension; Migraine; Polysubstance abuse (3/8/2018); Tobacco use (4/18/2016); Ulcer of left lower extremity with necrosis of muscle (HCC) (3/21/2016); and Venous stasis ulcer ([facility]) (2017).    ROS   As above in HPI     Objective:     Blood pressure (!) 98/60, pulse (!) 48, temperature 36.6 °C (97.9 °F), resp. rate 16, height 1.829 m (6' 0.01\"), weight 81.6 kg (180 lb), SpO2 95 %. Body mass index is 24.41 kg/m².   Physical Exam:  Constitutional: Alert, no distress.  Skin: Warm, dry, good turgor, shallow ulcer over left posterior lower leg without evidence of infection.  Eye: Equal, round and reactive, conjunctiva clear, lids normal.  Psych: Alert and oriented x3, normal affect and mood.      Assessment and Plan:   The following treatment plan was discussed    1. " Right rotator cuff tendonitis  Improved after subacromial bursa injection. Patient will benefit from physical therapy at this point as his symptoms are slowly starting to return and it is a little too early to give him another shot. He will follow-up with me in 6 weeks for reevaluation and consideration for repeat injection at that time.  - REFERRAL TO PHYSICAL THERAPY Reason for Therapy: Eval/Treat/Report    2. Essential hypertension  Blood pressures are a little bit low and he is bradycardic today so we will decrease his propranolol to 10 mg twice daily. We will continue his other medications as prescribed.  -Amlodipine 5 mg daily  -HCTZ 25 mg daily  - propranolol (INDERAL) 10 MG Tab; Take 1 Tab by mouth 2 times a day.  Dispense: 60 Tab; Refill: 3    3. Venous stasis ulcer of left lower extremity (HCC)  Stable, improving with wound care. Patient will continue to follow up with outpatient wound clinic    4. Recurrent epistaxis  Not severe.Mostly anterior. Suspect he has an intranasal lesion that keeps bleeding every time he blows his nose. We discussed refraining from blowing his nose for the next several days to allow the wound to heal.  Can continue to use the Afrin as needed.    5. Anxiety  Improved with citalopram and propranolol. Continue current meds except for reduced dose propranolol due to hypotension/bradycardia.  -Celexa 20 mg daily  - propranolol (INDERAL) 10 MG Tab; Take 1 Tab by mouth 2 times a day.  Dispense: 60 Tab; Refill: 3        Followup: Return in about 6 weeks (around 7/11/2018) for shoulder pain, , hypertension.

## 2018-05-30 NOTE — ASSESSMENT & PLAN NOTE
Patient presents for follow-up hypertension. At last visit we had switched his metoprolol to propranolol 20 mg twice a day to try to provide blood pressure control as well as help with his anxiety. He also been started on amlodipine 5 mg daily around that time by his cardiologist. Taking all of his medications as prescribed. He denies lightheadedness or dizziness with standing. Blood pressure clinic today 98/60 with a heart rate of 48.  Has not been monitoring blood pressures at home.

## 2018-06-06 ENCOUNTER — NON-PROVIDER VISIT (OUTPATIENT)
Dept: WOUND CARE | Facility: MEDICAL CENTER | Age: 63
End: 2018-06-06
Attending: NURSE PRACTITIONER
Payer: MEDICAID

## 2018-06-06 PROCEDURE — 97597 DBRDMT OPN WND 1ST 20 CM/<: CPT
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
## 2018-06-13 ENCOUNTER — NON-PROVIDER VISIT (OUTPATIENT)
Dept: WOUND CARE | Facility: MEDICAL CENTER | Age: 63
End: 2018-06-13
Attending: NURSE PRACTITIONER
Payer: MEDICAID

## 2018-06-13 PROCEDURE — 97597 DBRDMT OPN WND 1ST 20 CM/<: CPT
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
## 2018-06-18 DIAGNOSIS — F41.9 ANXIETY: ICD-10-CM

## 2018-06-19 RX ORDER — CITALOPRAM 20 MG/1
20 TABLET ORAL DAILY
Qty: 30 TAB | Refills: 5 | Status: SHIPPED | OUTPATIENT
Start: 2018-06-19 | End: 2019-04-18 | Stop reason: SDUPTHER

## 2018-06-20 ENCOUNTER — NON-PROVIDER VISIT (OUTPATIENT)
Dept: WOUND CARE | Facility: MEDICAL CENTER | Age: 63
End: 2018-06-20
Attending: NURSE PRACTITIONER
Payer: MEDICAID

## 2018-06-20 PROCEDURE — 97597 DBRDMT OPN WND 1ST 20 CM/<: CPT
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
## 2018-06-27 ENCOUNTER — HOSPITAL ENCOUNTER (OUTPATIENT)
Facility: MEDICAL CENTER | Age: 63
End: 2018-06-27
Attending: NURSE PRACTITIONER
Payer: MEDICAID

## 2018-06-27 ENCOUNTER — NON-PROVIDER VISIT (OUTPATIENT)
Dept: WOUND CARE | Facility: MEDICAL CENTER | Age: 63
End: 2018-06-27
Attending: NURSE PRACTITIONER
Payer: MEDICAID

## 2018-06-27 DIAGNOSIS — T14.8XXA INFECTED WOUND: ICD-10-CM

## 2018-06-27 DIAGNOSIS — L08.9 INFECTED WOUND: Primary | ICD-10-CM

## 2018-06-27 DIAGNOSIS — T14.8XXA INFECTED WOUND: Primary | ICD-10-CM

## 2018-06-27 DIAGNOSIS — L08.9 INFECTED WOUND: ICD-10-CM

## 2018-06-27 PROCEDURE — 87077 CULTURE AEROBIC IDENTIFY: CPT | Mod: 91

## 2018-06-27 PROCEDURE — 87186 SC STD MICRODIL/AGAR DIL: CPT

## 2018-06-27 PROCEDURE — 87205 SMEAR GRAM STAIN: CPT

## 2018-06-27 PROCEDURE — 87070 CULTURE OTHR SPECIMN AEROBIC: CPT

## 2018-06-27 PROCEDURE — 97597 DBRDMT OPN WND 1ST 20 CM/<: CPT
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
## 2018-06-28 LAB
GRAM STN SPEC: NORMAL
SIGNIFICANT IND 70042: NORMAL
SITE SITE: NORMAL
SOURCE SOURCE: NORMAL

## 2018-06-29 DIAGNOSIS — L97.929 VENOUS STASIS ULCER OF LEFT LOWER EXTREMITY (HCC): ICD-10-CM

## 2018-06-29 DIAGNOSIS — I83.029 VENOUS STASIS ULCER OF LEFT LOWER EXTREMITY (HCC): ICD-10-CM

## 2018-06-29 DIAGNOSIS — L08.9 WOUND INFECTION: ICD-10-CM

## 2018-06-29 DIAGNOSIS — T14.8XXA WOUND INFECTION: ICD-10-CM

## 2018-06-29 RX ORDER — SULFAMETHOXAZOLE AND TRIMETHOPRIM 800; 160 MG/1; MG/1
1 TABLET ORAL 2 TIMES DAILY
Qty: 20 TAB | Refills: 0 | Status: SHIPPED | OUTPATIENT
Start: 2018-06-29 | End: 2018-07-09

## 2018-06-30 LAB
BACTERIA WND AEROBE CULT: ABNORMAL
GRAM STN SPEC: ABNORMAL
SIGNIFICANT IND 70042: ABNORMAL
SITE SITE: ABNORMAL
SOURCE SOURCE: ABNORMAL

## 2018-06-30 NOTE — PROGRESS NOTES
Wound cx reviewed with sensitivities to MRSA and proteus only, waiting for psuedomonas to result. Reviewed wound photo, allergies, labs-renal function, medications. Rx for bactrim DS sent to pharmacy. Called and spoke with daughter Megan. She will  medication. Informed Megan that pt may also be placed on a second abx once final cx results for psuedomonas.

## 2018-07-01 DIAGNOSIS — T14.8XXA WOUND INFECTION: ICD-10-CM

## 2018-07-01 DIAGNOSIS — L08.9 WOUND INFECTION: ICD-10-CM

## 2018-07-01 RX ORDER — CIPROFLOXACIN 500 MG/1
500 TABLET, FILM COATED ORAL 2 TIMES DAILY
Qty: 20 TAB | Refills: 0 | Status: SHIPPED | OUTPATIENT
Start: 2018-07-01 | End: 2018-07-11

## 2018-07-03 ENCOUNTER — NON-PROVIDER VISIT (OUTPATIENT)
Dept: WOUND CARE | Facility: MEDICAL CENTER | Age: 63
End: 2018-07-03
Attending: NURSE PRACTITIONER
Payer: MEDICAID

## 2018-07-03 PROCEDURE — 97597 DBRDMT OPN WND 1ST 20 CM/<: CPT
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
## 2018-07-11 ENCOUNTER — NON-PROVIDER VISIT (OUTPATIENT)
Dept: WOUND CARE | Facility: MEDICAL CENTER | Age: 63
End: 2018-07-11
Attending: NURSE PRACTITIONER
Payer: MEDICAID

## 2018-07-11 ENCOUNTER — OFFICE VISIT (OUTPATIENT)
Dept: MEDICAL GROUP | Facility: MEDICAL CENTER | Age: 63
End: 2018-07-11
Attending: INTERNAL MEDICINE
Payer: MEDICAID

## 2018-07-11 VITALS
HEIGHT: 72 IN | RESPIRATION RATE: 16 BRPM | TEMPERATURE: 97.9 F | BODY MASS INDEX: 23.98 KG/M2 | WEIGHT: 177 LBS | SYSTOLIC BLOOD PRESSURE: 128 MMHG | DIASTOLIC BLOOD PRESSURE: 80 MMHG | OXYGEN SATURATION: 97 % | HEART RATE: 56 BPM

## 2018-07-11 DIAGNOSIS — R04.0 RECURRENT EPISTAXIS: ICD-10-CM

## 2018-07-11 DIAGNOSIS — M79.672 LEFT FOOT PAIN: ICD-10-CM

## 2018-07-11 DIAGNOSIS — I10 ESSENTIAL HYPERTENSION: ICD-10-CM

## 2018-07-11 DIAGNOSIS — M14.672 CHARCOT'S JOINT OF LEFT FOOT, NON-DIABETIC: ICD-10-CM

## 2018-07-11 DIAGNOSIS — Z11.59 SCREENING FOR VIRAL DISEASE: ICD-10-CM

## 2018-07-11 DIAGNOSIS — M75.81 RIGHT ROTATOR CUFF TENDONITIS: ICD-10-CM

## 2018-07-11 DIAGNOSIS — R60.0 LOWER EXTREMITY EDEMA: ICD-10-CM

## 2018-07-11 DIAGNOSIS — I83.029 VENOUS STASIS ULCER OF LEFT LOWER EXTREMITY (HCC): ICD-10-CM

## 2018-07-11 DIAGNOSIS — L97.929 VENOUS STASIS ULCER OF LEFT LOWER EXTREMITY (HCC): ICD-10-CM

## 2018-07-11 DIAGNOSIS — F10.20 ALCOHOLISM (HCC): ICD-10-CM

## 2018-07-11 DIAGNOSIS — N18.2 CKD (CHRONIC KIDNEY DISEASE) STAGE 2, GFR 60-89 ML/MIN: ICD-10-CM

## 2018-07-11 PROBLEM — I50.9 CHRONIC CONGESTIVE HEART FAILURE (HCC): Status: RESOLVED | Noted: 2017-11-16 | Resolved: 2018-07-11

## 2018-07-11 PROCEDURE — 700111 HCHG RX REV CODE 636 W/ 250 OVERRIDE (IP): Performed by: INTERNAL MEDICINE

## 2018-07-11 PROCEDURE — 97597 DBRDMT OPN WND 1ST 20 CM/<: CPT

## 2018-07-11 PROCEDURE — 99214 OFFICE O/P EST MOD 30 MIN: CPT | Mod: 25 | Performed by: INTERNAL MEDICINE

## 2018-07-11 PROCEDURE — 20610 DRAIN/INJ JOINT/BURSA W/O US: CPT | Performed by: INTERNAL MEDICINE

## 2018-07-11 PROCEDURE — 99213 OFFICE O/P EST LOW 20 MIN: CPT | Performed by: INTERNAL MEDICINE

## 2018-07-11 RX ORDER — TRIAMCINOLONE ACETONIDE 40 MG/ML
40 INJECTION, SUSPENSION INTRA-ARTICULAR; INTRAMUSCULAR ONCE
Status: COMPLETED | OUTPATIENT
Start: 2018-07-11 | End: 2018-07-11

## 2018-07-11 RX ADMIN — TRIAMCINOLONE ACETONIDE 40 MG: 40 INJECTION, SUSPENSION INTRA-ARTICULAR; INTRAMUSCULAR at 17:22

## 2018-07-11 ASSESSMENT — PAIN SCALES - GENERAL: PAINLEVEL: 5=MODERATE PAIN

## 2018-07-11 NOTE — ASSESSMENT & PLAN NOTE
Patient reports numerous injuries to his left foot and ankle in the past that have resulted in many of the ankle and foot.  He has limited range of motion in his ankle with pain.  When he was being seen for physical therapy for his shoulder, the therapist also recommended that they do some work on his leg and foot and has requested an updated referral allowing them to do so.

## 2018-07-11 NOTE — ASSESSMENT & PLAN NOTE
Patient was recently seen by wound care.  He has an infection of the venous stasis ulcer currently and he was started on antibiotics.  He has been having more tenderness in the area but he denies fevers, chills.
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
## 2018-07-11 NOTE — ASSESSMENT & PLAN NOTE
Patient has established care with physical therapy.  So far, reports that it has been beneficial.  He is having less pain in general but he is still having discomfort when reaching overhead or across his body.  He had benefit previously from a subacromial bursa injection done 4/18/18, is requesting repeat injection today.

## 2018-07-11 NOTE — ASSESSMENT & PLAN NOTE
Patient reports continued nosebleeds that have worsened.  They are occurring almost on a daily basis.  He has tried Afrin to help slow the bleeds down.  Reports nasal dryness but denies nose picking behavior.

## 2018-07-12 NOTE — ASSESSMENT & PLAN NOTE
Patient has continued to take his metoprolol in addition to the propranolol.  His heart rate was 56 today.  We discussed that he needs to stop the metoprolol and just continue the propranolol which she has found beneficial for his anxiety as well as blood pressure.

## 2018-07-12 NOTE — PROGRESS NOTES
Subjective:   Goran Chavez is a 63 y.o. male here today for follow up shoulder pain, left foot pain, chronic medical problems as below    Venous stasis ulcer of left lower extremity (CMS-HCC)  Patient was recently seen by wound care.  He has an infection of the venous stasis ulcer currently and he was started on antibiotics.  He has been having more tenderness in the area but he denies fevers, chills.    Right rotator cuff tendonitis  Patient has established care with physical therapy.  So far, reports that it has been beneficial.  He is having less pain in general but he is still having discomfort when reaching overhead or across his body.  He had benefit previously from a subacromial bursa injection done 4/18/18, is requesting repeat injection today.    Recurrent epistaxis  Patient reports continued nosebleeds that have worsened.  They are occurring almost on a daily basis.  He has tried Afrin to help slow the bleeds down.  Reports nasal dryness but denies nose picking behavior.    Left foot pain  Patient reports numerous injuries to his left foot and ankle in the past that have resulted in many of the ankle and foot.  He has limited range of motion in his ankle with pain.  When he was being seen for physical therapy for his shoulder, the therapist also recommended that they do some work on his leg and foot and has requested an updated referral allowing them to do so.    Lower extremity edema  Patient has noticed both of his legs are more swollen lately.  He also notices swelling underneath his eyes.  He denies swelling in his abdominal area.  He does not currently take Lasix.  He has an echocardiogram done December 13, 2017 which showed an ejection fraction of 57% with mild concentric LVH, global reduced right ventricular systolic function, mild to moderate tricuspid regurg with an RVSP of 55-60.  He has a history of heavy alcohol use but has not been evaluated for cirrhosis.  He has CKD but it is only  stage II based on his most recent labs.    HTN (hypertension)  Patient has continued to take his metoprolol in addition to the propranolol.  His heart rate was 56 today.  We discussed that he needs to stop the metoprolol and just continue the propranolol which she has found beneficial for his anxiety as well as blood pressure.    Alcoholism (HCC)  Patient has a history of very heavy alcohol use.  He now drinks several beers a day if he has them.  He thinks it has been a couple of weeks since his last drink.    Charcot's joint of left foot, non-diabetic  Patient has numerous injuries to his left foot and ankle including repeated sprains.  He now has deformity of the ankle and he reports intermittent pain.  He has difficulty wearing shoes because of this.  He also has callus buildup and superficial ulcerations resulting from his foot deformity and the rubbing on his shoes.  He does not currently see a podiatrist and he does not have any special orthotics.         Current medicines (including changes today)  Current Outpatient Prescriptions   Medication Sig Dispense Refill   • ciprofloxacin (CIPRO) 500 MG Tab Take 1 Tab by mouth 2 times a day for 10 days. 20 Tab 0   • citalopram (CELEXA) 20 MG Tab Take 1 Tab by mouth every day. 30 Tab 5   • propranolol (INDERAL) 10 MG Tab Take 1 Tab by mouth 2 times a day. 60 Tab 3   • aspirin EC (ECOTRIN) 81 MG Tablet Delayed Response Take 1 Tab by mouth every day. 30 Tab 11   • amLODIPine (NORVASC) 5 MG Tab Take 1 Tab by mouth every day. 30 Tab 3   • gabapentin (NEURONTIN) 300 MG Cap Take 1 Cap by mouth 2 Times a Day. 60 Cap 3   • atorvastatin (LIPITOR) 40 MG Tab Take 1 Tab by mouth every bedtime. 90 Tab 3     No current facility-administered medications for this visit.      He  has a past medical history of Anxiety; Charcot's joint of left foot, non-diabetic (3/21/2016); Chronic congestive heart failure (HCC) (11/16/2017); Hypertension; Migraine; Polysubstance abuse (3/8/2018);  "Tobacco use (4/18/2016); Ulcer of left lower extremity with necrosis of muscle (HCC) (3/21/2016); and Venous stasis ulcer (HCC) (2017).    ROS   As above in HPI     Objective:     Blood pressure 128/80, pulse (!) 56, temperature 36.6 °C (97.9 °F), resp. rate 16, height 1.829 m (6' 0.01\"), weight 80.3 kg (177 lb), SpO2 97 %. Body mass index is 24 kg/m².   Physical Exam:  Constitutional: Alert, no distress.  Skin: Warm, dry, good turgor, skin very dry and cracked over lower legs and between toes.  Did not examine left venous stasis ulcer as he just came from his  wound care appointment with a fresh dressing placed and started an antibiotic for cellulitis  Eye: Equal, round and reactive, conjunctiva clear, lids normal.  Cardiovascular: Regular rate and rhythm, no murmurs appreciated, 1-2+ pitting edema to mid tibia bilaterally  Abdomen: Soft, non distended  MSK: some swelling over posterior right shoulder however still able to palpate scapular spine and appropriate bony landmarks.  Positive impingement sign on right, full active ROM of right shoulder, tenderness over anterior GH joint.  Left foot deformed consistent with charcot foot.    PROCEDURE NOTE:     Discussed risks and benefits of procedure with patient.  Patient verbally agreed to procedure. The right posterior shoulder was prepped with alcohol solution. Using a 23 guage needle the glenhumoral joint was injected with 2 cc 1% xylocaine and 1 cc of kenalog 40 mg under the posterior aspect of the acromion. The area was cleansed and dressed with a band aid.        Assessment and Plan:   The following treatment plan was discussed    1. Charcot's joint of left foot, non-diabetic  Patient would benefit from having a prosthetic shoe or other corrective orthopedic device to help with his walking and offloading of the foot so he does not develop ulcerations and calluses.  I have referred him to podiatry for further assistance.  He also would benefit from physical " therapy to help with mobility of his foot and I have included this in an updated PT referral.  - REFERRAL TO PODIATRY  - REFERRAL TO PHYSICAL THERAPY Reason for Therapy: Eval/Treat/Report    2. Recurrent epistaxis  Continues despite treatment.  Therefore we will send him to ENT to see if he has any vessels that need to be cauterized inside his nose.  - REFERRAL TO ENT  - CBC WITHOUT DIFFERENTIAL; Future  - PROTHROMBIN TIME; Future    3. CKD (chronic kidney disease) stage 2, GFR 60-89 ml/min  - COMP METABOLIC PANEL; Future    4. Screening for viral disease  - HEPATITIS PANEL ACUTE(4 COMPONENTS); Future    5. Alcoholism (HCC)  We will evaluate for cirrhosis with an ultrasound  - US-ABDOMEN COMPLETE SURVEY; Future    6. Lower extremity edema  Although he does have some mild concentric LVH on an echocardiogram done about 6 months ago, I am not convinced this fully explains his lower extremity edema.  I think we need to rule out cirrhosis in the setting of his heavy alcohol use in the past.  We will obtain an abdominal ultrasound as well as updated liver function tests to start this workup.  Encouraged him to abstain from alcohol.  - US-ABDOMEN COMPLETE SURVEY; Future    7. Venous stasis ulcer of left lower extremity (HCC)  Currently under the care of wound care who is assisting with management.  He will continue antibiotics and follow-up with them as instructed.    8. Right rotator cuff tendonitis  Improving although still with some pain.  We repeated a subacromial bursa injection today and he will continue to follow up with physical therapy.  -Continue PT follow-up  -Subacromial bursa injection today  - triamcinolone acetonide (KENALOG-40) injection 40 mg; 1 mL by Intra-articular route Once.    9. Left foot pain  - REFERRAL TO PHYSICAL THERAPY Reason for Therapy: Eval/Treat/Report    10. Essential hypertension  Discussed with patient that he needs to discontinue the metoprolol and only continue the propranolol as he  should not be on dual beta-blocker therapy.  He expresses understanding.  -Patient advised to stop metoprolol        Followup: Return in about 6 weeks (around 8/22/2018), or if symptoms worsen or fail to improve.

## 2018-07-12 NOTE — ASSESSMENT & PLAN NOTE
Patient has noticed both of his legs are more swollen lately.  He also notices swelling underneath his eyes.  He denies swelling in his abdominal area.  He does not currently take Lasix.  He has an echocardiogram done December 13, 2017 which showed an ejection fraction of 57% with mild concentric LVH, global reduced right ventricular systolic function, mild to moderate tricuspid regurg with an RVSP of 55-60.  He has a history of heavy alcohol use but has not been evaluated for cirrhosis.  He has CKD but it is only stage II based on his most recent labs.

## 2018-07-12 NOTE — ASSESSMENT & PLAN NOTE
Patient has a history of very heavy alcohol use.  He now drinks several beers a day if he has them.  He thinks it has been a couple of weeks since his last drink.

## 2018-07-12 NOTE — ASSESSMENT & PLAN NOTE
Patient has numerous injuries to his left foot and ankle including repeated sprains.  He now has deformity of the ankle and he reports intermittent pain.  He has difficulty wearing shoes because of this.  He also has callus buildup and superficial ulcerations resulting from his foot deformity and the rubbing on his shoes.  He does not currently see a podiatrist and he does not have any special orthotics.

## 2018-07-18 ENCOUNTER — NON-PROVIDER VISIT (OUTPATIENT)
Dept: WOUND CARE | Facility: MEDICAL CENTER | Age: 63
End: 2018-07-18
Attending: NURSE PRACTITIONER
Payer: MEDICAID

## 2018-07-18 PROCEDURE — 97597 DBRDMT OPN WND 1ST 20 CM/<: CPT
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
## 2018-07-25 ENCOUNTER — NON-PROVIDER VISIT (OUTPATIENT)
Dept: WOUND CARE | Facility: MEDICAL CENTER | Age: 63
End: 2018-07-25
Attending: NURSE PRACTITIONER
Payer: MEDICAID

## 2018-07-25 PROCEDURE — 97597 DBRDMT OPN WND 1ST 20 CM/<: CPT
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
## 2018-08-01 ENCOUNTER — HOSPITAL ENCOUNTER (OUTPATIENT)
Dept: LAB | Facility: MEDICAL CENTER | Age: 63
End: 2018-08-01
Attending: INTERNAL MEDICINE
Payer: MEDICAID

## 2018-08-01 ENCOUNTER — NON-PROVIDER VISIT (OUTPATIENT)
Dept: WOUND CARE | Facility: MEDICAL CENTER | Age: 63
End: 2018-08-01
Attending: INTERNAL MEDICINE
Payer: MEDICAID

## 2018-08-01 DIAGNOSIS — Z11.59 SCREENING FOR VIRAL DISEASE: ICD-10-CM

## 2018-08-01 DIAGNOSIS — N18.2 CKD (CHRONIC KIDNEY DISEASE) STAGE 2, GFR 60-89 ML/MIN: ICD-10-CM

## 2018-08-01 DIAGNOSIS — R04.0 RECURRENT EPISTAXIS: ICD-10-CM

## 2018-08-01 LAB
ALBUMIN SERPL BCP-MCNC: 4 G/DL (ref 3.2–4.9)
ALBUMIN/GLOB SERPL: 1.3 G/DL
ALP SERPL-CCNC: 52 U/L (ref 30–99)
ALT SERPL-CCNC: 23 U/L (ref 2–50)
ANION GAP SERPL CALC-SCNC: 7 MMOL/L (ref 0–11.9)
AST SERPL-CCNC: 26 U/L (ref 12–45)
BILIRUB SERPL-MCNC: 0.4 MG/DL (ref 0.1–1.5)
BUN SERPL-MCNC: 40 MG/DL (ref 8–22)
CALCIUM SERPL-MCNC: 8.8 MG/DL (ref 8.5–10.5)
CHLORIDE SERPL-SCNC: 110 MMOL/L (ref 96–112)
CO2 SERPL-SCNC: 23 MMOL/L (ref 20–33)
CREAT SERPL-MCNC: 1.12 MG/DL (ref 0.5–1.4)
ERYTHROCYTE [DISTWIDTH] IN BLOOD BY AUTOMATED COUNT: 56 FL (ref 35.9–50)
GLOBULIN SER CALC-MCNC: 3 G/DL (ref 1.9–3.5)
GLUCOSE SERPL-MCNC: 98 MG/DL (ref 65–99)
HAV IGM SERPL QL IA: NEGATIVE
HBV CORE IGM SER QL: NEGATIVE
HBV SURFACE AG SER QL: NEGATIVE
HCT VFR BLD AUTO: 37.5 % (ref 42–52)
HCV AB SER QL: REACTIVE
HGB BLD-MCNC: 11.8 G/DL (ref 14–18)
INR PPP: 1.01 (ref 0.87–1.13)
MCH RBC QN AUTO: 32.2 PG (ref 27–33)
MCHC RBC AUTO-ENTMCNC: 31.5 G/DL (ref 33.7–35.3)
MCV RBC AUTO: 102.5 FL (ref 81.4–97.8)
PLATELET # BLD AUTO: 231 K/UL (ref 164–446)
PMV BLD AUTO: 9.8 FL (ref 9–12.9)
POTASSIUM SERPL-SCNC: 3.8 MMOL/L (ref 3.6–5.5)
PROT SERPL-MCNC: 7 G/DL (ref 6–8.2)
PROTHROMBIN TIME: 13 SEC (ref 12–14.6)
RBC # BLD AUTO: 3.66 M/UL (ref 4.7–6.1)
SODIUM SERPL-SCNC: 140 MMOL/L (ref 135–145)
WBC # BLD AUTO: 4.9 K/UL (ref 4.8–10.8)

## 2018-08-01 PROCEDURE — 97597 DBRDMT OPN WND 1ST 20 CM/<: CPT

## 2018-08-01 PROCEDURE — 36415 COLL VENOUS BLD VENIPUNCTURE: CPT

## 2018-08-01 PROCEDURE — 85027 COMPLETE CBC AUTOMATED: CPT

## 2018-08-01 PROCEDURE — 85610 PROTHROMBIN TIME: CPT

## 2018-08-01 PROCEDURE — 80053 COMPREHEN METABOLIC PANEL: CPT

## 2018-08-01 PROCEDURE — 87522 HEPATITIS C REVRS TRNSCRPJ: CPT

## 2018-08-01 PROCEDURE — 80074 ACUTE HEPATITIS PANEL: CPT
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
## 2018-08-03 ENCOUNTER — TELEPHONE (OUTPATIENT)
Dept: MEDICAL GROUP | Facility: MEDICAL CENTER | Age: 63
End: 2018-08-03

## 2018-08-03 DIAGNOSIS — M79.672 LEFT FOOT PAIN: ICD-10-CM

## 2018-08-03 DIAGNOSIS — M14.672 CHARCOT'S JOINT OF LEFT FOOT, NON-DIABETIC: ICD-10-CM

## 2018-08-03 NOTE — TELEPHONE ENCOUNTER
[facility] is asking if they could obtain a referral regarding left foot and Left ankle if appropriate. . Pt is actively complaining about pain and difficulty with the left foot.

## 2018-08-04 LAB
HCV RNA SERPL NAA+PROBE-ACNC: ABNORMAL IU/ML
HCV RNA SERPL NAA+PROBE-LOG IU: 6.8 LOG IU
HCV RNA SERPL QL NAA+PROBE: DETECTED
PATHOLOGY STUDY: ABNORMAL

## 2018-08-07 PROBLEM — B18.2 CHRONIC HEPATITIS C WITHOUT HEPATIC COMA (HCC): Status: ACTIVE | Noted: 2018-08-07

## 2018-08-08 ENCOUNTER — NON-PROVIDER VISIT (OUTPATIENT)
Dept: WOUND CARE | Facility: MEDICAL CENTER | Age: 63
End: 2018-08-08
Attending: INTERNAL MEDICINE
Payer: MEDICAID

## 2018-08-08 PROCEDURE — 97597 DBRDMT OPN WND 1ST 20 CM/<: CPT
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
## 2018-08-15 ENCOUNTER — HOSPITAL ENCOUNTER (OUTPATIENT)
Dept: RADIOLOGY | Facility: MEDICAL CENTER | Age: 63
End: 2018-08-15
Attending: INTERNAL MEDICINE
Payer: MEDICAID

## 2018-08-15 ENCOUNTER — OFFICE VISIT (OUTPATIENT)
Dept: MEDICAL GROUP | Facility: MEDICAL CENTER | Age: 63
End: 2018-08-15
Attending: INTERNAL MEDICINE
Payer: MEDICAID

## 2018-08-15 ENCOUNTER — HOSPITAL ENCOUNTER (OUTPATIENT)
Dept: LAB | Facility: MEDICAL CENTER | Age: 63
End: 2018-08-15
Attending: INTERNAL MEDICINE
Payer: MEDICAID

## 2018-08-15 ENCOUNTER — NON-PROVIDER VISIT (OUTPATIENT)
Dept: WOUND CARE | Facility: MEDICAL CENTER | Age: 63
End: 2018-08-15
Attending: INTERNAL MEDICINE
Payer: MEDICAID

## 2018-08-15 VITALS
BODY MASS INDEX: 22.35 KG/M2 | HEART RATE: 74 BPM | HEIGHT: 72 IN | SYSTOLIC BLOOD PRESSURE: 115 MMHG | TEMPERATURE: 99.1 F | OXYGEN SATURATION: 96 % | RESPIRATION RATE: 16 BRPM | WEIGHT: 165 LBS | DIASTOLIC BLOOD PRESSURE: 80 MMHG

## 2018-08-15 DIAGNOSIS — K83.8 DILATED CBD, ACQUIRED: ICD-10-CM

## 2018-08-15 DIAGNOSIS — R60.0 LOWER EXTREMITY EDEMA: ICD-10-CM

## 2018-08-15 DIAGNOSIS — F10.20 ALCOHOLISM (HCC): ICD-10-CM

## 2018-08-15 DIAGNOSIS — R04.0 RECURRENT EPISTAXIS: ICD-10-CM

## 2018-08-15 DIAGNOSIS — D53.9 MACROCYTIC ANEMIA: ICD-10-CM

## 2018-08-15 DIAGNOSIS — B18.2 CHRONIC HEPATITIS C WITHOUT HEPATIC COMA (HCC): ICD-10-CM

## 2018-08-15 LAB
FERRITIN SERPL-MCNC: 60.5 NG/ML (ref 22–322)
FOLATE SERPL-MCNC: >24 NG/ML
IRON SATN MFR SERPL: 18 % (ref 15–55)
IRON SERPL-MCNC: 79 UG/DL (ref 50–180)
TIBC SERPL-MCNC: 435 UG/DL (ref 250–450)
VIT B12 SERPL-MCNC: 524 PG/ML (ref 211–911)

## 2018-08-15 PROCEDURE — 82728 ASSAY OF FERRITIN: CPT

## 2018-08-15 PROCEDURE — 83550 IRON BINDING TEST: CPT

## 2018-08-15 PROCEDURE — 97597 DBRDMT OPN WND 1ST 20 CM/<: CPT

## 2018-08-15 PROCEDURE — 76700 US EXAM ABDOM COMPLETE: CPT

## 2018-08-15 PROCEDURE — 99214 OFFICE O/P EST MOD 30 MIN: CPT | Performed by: INTERNAL MEDICINE

## 2018-08-15 PROCEDURE — 82746 ASSAY OF FOLIC ACID SERUM: CPT

## 2018-08-15 PROCEDURE — 99213 OFFICE O/P EST LOW 20 MIN: CPT | Performed by: INTERNAL MEDICINE

## 2018-08-15 PROCEDURE — 83540 ASSAY OF IRON: CPT

## 2018-08-15 PROCEDURE — 36415 COLL VENOUS BLD VENIPUNCTURE: CPT

## 2018-08-15 PROCEDURE — 82607 VITAMIN B-12: CPT

## 2018-08-15 ASSESSMENT — PAIN SCALES - GENERAL: PAINLEVEL: 5=MODERATE PAIN

## 2018-08-15 ASSESSMENT — PATIENT HEALTH QUESTIONNAIRE - PHQ9: CLINICAL INTERPRETATION OF PHQ2 SCORE: 0

## 2018-08-15 NOTE — ASSESSMENT & PLAN NOTE
Patient has been seen by ENT and states that to blood vessels in his nose were cauterized.  He has not had any issues with bleeding since then.

## 2018-08-15 NOTE — PROGRESS NOTES
Subjective:   Goran Chavez is a 63 y.o. male here today for new diagnosis hep C, follow-up labs and imaging    Dilated cbd, acquired  Patient recently had an abdominal ultrasound done because he is hepatitis C positive and he has a history of heavy alcohol use.  Incidentally noted, was a dilated common bile duct at 10 mm.  Patient denies right upper quadrant pain after eating.  There were no gallstones seen on imaging.  There is a question of whether this is a mass or an obstructing stone causing the dilation.  He denies fevers, chills, night sweats.  He has lost 23 pounds since April 2018 but he says that he has been trying to lose weight by eating less and his daughter also notes that she has been buying him healthier foods.    Macrocytic anemia  Most recent blood test shows a hemoglobin of 11.8.  Patient continues to drink about a 12 pack a week of beer.  He has a history of much heavier alcohol use in the past.  He denies melena or hematochezia.  He denies abdominal pain.  He was having some mild nose bleeding which is now resolved.  He denies easy bruising or bleeding.  He is not sure if he has a history of anemia.    Chronic hepatitis C without hepatic coma (HCC)  Most recent labs showed patient is positive for hepatitis C with positive viral load.  He is interested in getting treated for this.  He has a history of intravenous drug use and believes he got it about 40 years ago as he has a friend that he mentions had hepatitis and they shared needles.  He has a history of a blood transfusion in 1997.  He has never been treated for hepatitis C in the past.  He states that he has never been tested until now.    Recurrent epistaxis  Patient has been seen by ENT and states that to blood vessels in his nose were cauterized.  He has not had any issues with bleeding since then.       Current medicines (including changes today)  Current Outpatient Prescriptions   Medication Sig Dispense Refill   • citalopram  "(CELEXA) 20 MG Tab Take 1 Tab by mouth every day. 30 Tab 5   • propranolol (INDERAL) 10 MG Tab Take 1 Tab by mouth 2 times a day. 60 Tab 3   • aspirin EC (ECOTRIN) 81 MG Tablet Delayed Response Take 1 Tab by mouth every day. 30 Tab 11   • amLODIPine (NORVASC) 5 MG Tab Take 1 Tab by mouth every day. 30 Tab 3   • gabapentin (NEURONTIN) 300 MG Cap Take 1 Cap by mouth 2 Times a Day. 60 Cap 3   • atorvastatin (LIPITOR) 40 MG Tab Take 1 Tab by mouth every bedtime. 90 Tab 3     No current facility-administered medications for this visit.      He  has a past medical history of Anxiety; Charcot's joint of left foot, non-diabetic (3/21/2016); Chronic congestive heart failure (HCC) (11/16/2017); Hepatitis C, chronic (HCC); Hypertension; Migraine; Polysubstance abuse (3/8/2018); Tobacco use (4/18/2016); Ulcer of left lower extremity with necrosis of muscle (HCC) (3/21/2016); and Venous stasis ulcer ([facility]) (2017).    ROS   As above in HPI  Reports improvement in shoulder pain with PT  Reports thickened toenails bilaterally, sees podiatry in December     Objective:     Blood pressure 115/80, pulse 74, temperature 37.3 °C (99.1 °F), resp. rate 16, height 1.829 m (6' 0.01\"), weight 74.8 kg (165 lb), SpO2 96 %. Body mass index is 22.37 kg/m².   Physical Exam:  Constitutional: Alert, no distress.  Skin: Warm, dry, good turgor, no rashes in visible areas, toenails are thickened but not overgrowing or causing ulcerations.  Eye: Equal, round and reactive, conjunctiva clear, lids normal.  Psych: Alert and oriented x3, normal affect and mood.      Results and Imaging:   Lab Results   Component Value Date/Time    WBC 4.9 08/01/2018 01:41 PM    RBC 3.66 (L) 08/01/2018 01:41 PM    HEMOGLOBIN 11.8 (L) 08/01/2018 01:41 PM    HEMATOCRIT 37.5 (L) 08/01/2018 01:41 PM    .5 (H) 08/01/2018 01:41 PM    MCH 32.2 08/01/2018 01:41 PM    MCHC 31.5 (L) 08/01/2018 01:41 PM    MPV 9.8 08/01/2018 01:41 PM    NEUTSPOLYS 76.3 (H) 09/27/2009 06:10 PM    " LYMPHOCYTES 13.5 (L) 09/27/2009 06:10 PM    MONOCYTES 7.7 09/27/2009 06:10 PM    EOSINOPHILS 2.3 09/27/2009 06:10 PM    BASOPHILS 0.2 09/27/2009 06:10 PM    HYPOCHROMIA 1+ 06/14/2016 07:55 PM         Ref. Range 8/1/2018 13:41   Sodium Latest Ref Range: 135 - 145 mmol/L 140   Potassium Latest Ref Range: 3.6 - 5.5 mmol/L 3.8   Chloride Latest Ref Range: 96 - 112 mmol/L 110   Co2 Latest Ref Range: 20 - 33 mmol/L 23   Anion Gap Latest Ref Range: 0.0 - 11.9  7.0   Glucose Latest Ref Range: 65 - 99 mg/dL 98   Bun Latest Ref Range: 8 - 22 mg/dL 40 (H)   Creatinine Latest Ref Range: 0.50 - 1.40 mg/dL 1.12   GFR If  Latest Ref Range: >60 mL/min/1.73 m 2 >60   GFR If Non  Latest Ref Range: >60 mL/min/1.73 m 2 >60   Calcium Latest Ref Range: 8.5 - 10.5 mg/dL 8.8   AST(SGOT) Latest Ref Range: 12 - 45 U/L 26   ALT(SGPT) Latest Ref Range: 2 - 50 U/L 23   Alkaline Phosphatase Latest Ref Range: 30 - 99 U/L 52   Total Bilirubin Latest Ref Range: 0.1 - 1.5 mg/dL 0.4   Albumin Latest Ref Range: 3.2 - 4.9 g/dL 4.0   Total Protein Latest Ref Range: 6.0 - 8.2 g/dL 7.0   Globulin Latest Ref Range: 1.9 - 3.5 g/dL 3.0   A-G Ratio Latest Units: g/dL 1.3   PT Latest Ref Range: 12.0 - 14.6 sec 13.0   INR Latest Ref Range: 0.87 - 1.13  1.01   Hepatitis A Virus Ab, IgM Latest Ref Range: Negative  Negative   Hepatitis B Surface Antigen Latest Ref Range: Negative  Negative   Hepatitis B Cors Ab,IgM Latest Ref Range: Negative  Negative   EER HCV RNA Qnt, PCR Unknown See Note   Hepatitis C Antibody Latest Ref Range: Negative  Reactive (A)   Hepatitis C Rna By Pcr, Quanti Latest Units: IU/mL 6,000,000   HCV RNA PCR Qnt Log Latest Units: log IU 6.8   HCV RNA PCR Qnt Int Latest Ref Range: Not Detected  Detected (A)     Abdominal US (8/15/18):      FINDINGS:  The liver echogenicity is increased and heterogeneous. No evidence of hepatic mass. The gallbladder is normal. There is no evidence of gallstone. The common bile  duct is measured at 10 mm. The gallbladder wall thickness is measured at 2 mm. No evidence   of pericholecystic fluid. The visualized portions of the portal vein and inferior vena cava are normal. The pancreas is normal. The aorta is normal in appearance. The right kidney is normal. The left kidney is normal. The spleen is normal. There is no   ascites seen.   Impression       1.  Dilated common bile duct measured at 10 mm. Consideration should be given for distal obstructing stone or mass.    2.  No gallstones seen within the gallbladder.    3.  The liver is echogenic consistent with fatty change versus hepatocellular dysfunction.           Assessment and Plan:   The following treatment plan was discussed    1. Chronic hepatitis C without hepatic coma (HCC)  Labs are consistent with hepatitis C.  Patient has never been treated in the past.  I have referred him to GI for further care.  - REFERRAL TO GASTROENTEROLOGY    2. Macrocytic anemia  Unclear etiology.  Patient denies obvious GI bleed.  Given history of alcoholism and macrocytosis on CBC, we will evaluate for B12 and folate deficiencies.  Fecal occult blood as screening for GI bleed.  Iron studies ordered.  - IRON/TOTAL IRON BIND; Future  - FERRITIN; Future  - VITAMIN B12; Future  - FOLATE; Future  - OCCULT BLOOD FECES IMMUNOASSAY; Future    3. Dilated cbd, acquired  Unclear etiology.  Briefly discussed with patient and he does not have any right upper quadrant pain or any abdominal pain associated with eating.  Unclear reason why his duct is dilated.  I am hoping he can discuss this with GI.  We will follow-up at his next visit as we could consider ordering a CT abdomen or more detailed imaging like an MRCP if needed  - REFERRAL TO GASTROENTEROLOGY  -Discuss have follow-up appointment whether anything has been done by GI, consider further imaging at that time if it has not been addressed    4. Recurrent epistaxis  Stable after cautery.  Patient will follow up  with ENT as needed        Followup: Return in about 6 weeks (around 9/26/2018), or if symptoms worsen or fail to improve.

## 2018-08-15 NOTE — ASSESSMENT & PLAN NOTE
Most recent blood test shows a hemoglobin of 11.8.  Patient continues to drink about a 12 pack a week of beer.  He has a history of much heavier alcohol use in the past.  He denies melena or hematochezia.  He denies abdominal pain.  He was having some mild nose bleeding which is now resolved.  He denies easy bruising or bleeding.  He is not sure if he has a history of anemia.

## 2018-08-15 NOTE — ASSESSMENT & PLAN NOTE
Patient recently had an abdominal ultrasound done because he is hepatitis C positive and he has a history of heavy alcohol use.  Incidentally noted, was a dilated common bile duct at 10 mm.  Patient denies right upper quadrant pain after eating.  There were no gallstones seen on imaging.  There is a question of whether this is a mass or an obstructing stone causing the dilation.  He denies fevers, chills, night sweats.  He has lost 23 pounds since April 2018 but he says that he has been trying to lose weight by eating less and his daughter also notes that she has been buying him healthier foods.

## 2018-08-15 NOTE — ASSESSMENT & PLAN NOTE
Most recent labs showed patient is positive for hepatitis C with positive viral load.  He is interested in getting treated for this.  He has a history of intravenous drug use and believes he got it about 40 years ago as he has a friend that he mentions had hepatitis and they shared needles.  He has a history of a blood transfusion in 1997.  He has never been treated for hepatitis C in the past.  He states that he has never been tested until now.
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
## 2018-08-22 ENCOUNTER — NON-PROVIDER VISIT (OUTPATIENT)
Dept: WOUND CARE | Facility: MEDICAL CENTER | Age: 63
End: 2018-08-22
Attending: INTERNAL MEDICINE
Payer: MEDICAID

## 2018-08-22 PROCEDURE — 97597 DBRDMT OPN WND 1ST 20 CM/<: CPT
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
## 2018-08-29 ENCOUNTER — NON-PROVIDER VISIT (OUTPATIENT)
Dept: WOUND CARE | Facility: MEDICAL CENTER | Age: 63
End: 2018-08-29
Attending: INTERNAL MEDICINE
Payer: MEDICAID

## 2018-08-29 PROCEDURE — 97597 DBRDMT OPN WND 1ST 20 CM/<: CPT
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
## 2018-09-05 ENCOUNTER — NON-PROVIDER VISIT (OUTPATIENT)
Dept: WOUND CARE | Facility: MEDICAL CENTER | Age: 63
End: 2018-09-05
Attending: INTERNAL MEDICINE
Payer: MEDICAID

## 2018-09-05 PROCEDURE — 97597 DBRDMT OPN WND 1ST 20 CM/<: CPT
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
## 2018-09-12 ENCOUNTER — OFFICE VISIT (OUTPATIENT)
Dept: WOUND CARE | Facility: MEDICAL CENTER | Age: 63
End: 2018-09-12
Attending: INTERNAL MEDICINE
Payer: MEDICAID

## 2018-09-12 DIAGNOSIS — F12.90 MARIJUANA USE: ICD-10-CM

## 2018-09-12 DIAGNOSIS — F17.200 SMOKING: ICD-10-CM

## 2018-09-12 DIAGNOSIS — M14.672 CHARCOT'S JOINT OF LEFT FOOT, NON-DIABETIC: ICD-10-CM

## 2018-09-12 DIAGNOSIS — I73.9 ARTERIAL INSUFFICIENCY OF LOWER EXTREMITY (HCC): ICD-10-CM

## 2018-09-12 DIAGNOSIS — I83.029 VENOUS STASIS ULCER OF LEFT LOWER EXTREMITY (HCC): ICD-10-CM

## 2018-09-12 DIAGNOSIS — G62.9 PERIPHERAL POLYNEUROPATHY: ICD-10-CM

## 2018-09-12 DIAGNOSIS — Z72.0 TOBACCO USE: ICD-10-CM

## 2018-09-12 DIAGNOSIS — L97.922 NON-PRESSURE CHRONIC ULCER OF LEFT LOWER LEG WITH FAT LAYER EXPOSED (HCC): ICD-10-CM

## 2018-09-12 DIAGNOSIS — L97.929 VENOUS STASIS ULCER OF LEFT LOWER EXTREMITY (HCC): ICD-10-CM

## 2018-09-12 PROCEDURE — 11042 DBRDMT SUBQ TIS 1ST 20SQCM/<: CPT

## 2018-09-12 PROCEDURE — 99203 OFFICE O/P NEW LOW 30 MIN: CPT | Mod: 25 | Performed by: NURSE PRACTITIONER

## 2018-09-12 PROCEDURE — 99406 BEHAV CHNG SMOKING 3-10 MIN: CPT | Performed by: NURSE PRACTITIONER

## 2018-09-12 PROCEDURE — 11042 DBRDMT SUBQ TIS 1ST 20SQCM/<: CPT | Performed by: NURSE PRACTITIONER
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
## 2018-09-15 ASSESSMENT — ENCOUNTER SYMPTOMS
DIAPHORESIS: 0
COUGH: 0
SHORTNESS OF BREATH: 0
FEVER: 0
CLAUDICATION: 0
VOMITING: 0
NAUSEA: 0
WHEEZING: 0
BACK PAIN: 0
DEPRESSION: 0
NERVOUS/ANXIOUS: 0
FALLS: 0
WEAKNESS: 0
PALPITATIONS: 0
EYES NEGATIVE: 1
CHILLS: 0

## 2018-09-19 ENCOUNTER — APPOINTMENT (OUTPATIENT)
Dept: WOUND CARE | Facility: MEDICAL CENTER | Age: 63
End: 2018-09-19
Attending: INTERNAL MEDICINE
Payer: MEDICAID

## 2018-09-26 ENCOUNTER — OFFICE VISIT (OUTPATIENT)
Dept: MEDICAL GROUP | Facility: MEDICAL CENTER | Age: 63
End: 2018-09-26
Attending: INTERNAL MEDICINE
Payer: MEDICAID

## 2018-09-26 ENCOUNTER — OFFICE VISIT (OUTPATIENT)
Dept: WOUND CARE | Facility: MEDICAL CENTER | Age: 63
End: 2018-09-26
Attending: INTERNAL MEDICINE
Payer: MEDICAID

## 2018-09-26 VITALS
OXYGEN SATURATION: 98 % | DIASTOLIC BLOOD PRESSURE: 87 MMHG | TEMPERATURE: 98.6 F | RESPIRATION RATE: 16 BRPM | HEART RATE: 69 BPM | SYSTOLIC BLOOD PRESSURE: 124 MMHG

## 2018-09-26 VITALS
SYSTOLIC BLOOD PRESSURE: 118 MMHG | BODY MASS INDEX: 22.13 KG/M2 | OXYGEN SATURATION: 94 % | RESPIRATION RATE: 16 BRPM | HEIGHT: 72 IN | WEIGHT: 163.4 LBS | HEART RATE: 92 BPM | TEMPERATURE: 97.9 F | DIASTOLIC BLOOD PRESSURE: 78 MMHG

## 2018-09-26 DIAGNOSIS — L97.922 NON-PRESSURE CHRONIC ULCER OF LEFT LOWER LEG WITH FAT LAYER EXPOSED (HCC): ICD-10-CM

## 2018-09-26 DIAGNOSIS — D53.9 MACROCYTIC ANEMIA: ICD-10-CM

## 2018-09-26 DIAGNOSIS — I83.029 VENOUS STASIS ULCER OF LEFT LOWER EXTREMITY (HCC): ICD-10-CM

## 2018-09-26 DIAGNOSIS — M14.672 CHARCOT'S JOINT OF LEFT FOOT, NON-DIABETIC: ICD-10-CM

## 2018-09-26 DIAGNOSIS — Z72.0 TOBACCO USE: ICD-10-CM

## 2018-09-26 DIAGNOSIS — F12.90 MARIJUANA USE: ICD-10-CM

## 2018-09-26 DIAGNOSIS — G62.9 PERIPHERAL POLYNEUROPATHY: ICD-10-CM

## 2018-09-26 DIAGNOSIS — L97.929 VENOUS STASIS ULCER OF LEFT LOWER EXTREMITY (HCC): ICD-10-CM

## 2018-09-26 DIAGNOSIS — M75.81 RIGHT ROTATOR CUFF TENDONITIS: ICD-10-CM

## 2018-09-26 DIAGNOSIS — B18.2 CHRONIC HEPATITIS C WITHOUT HEPATIC COMA (HCC): ICD-10-CM

## 2018-09-26 DIAGNOSIS — Z23 NEED FOR VACCINATION: ICD-10-CM

## 2018-09-26 PROCEDURE — 99213 OFFICE O/P EST LOW 20 MIN: CPT | Performed by: INTERNAL MEDICINE

## 2018-09-26 PROCEDURE — 99214 OFFICE O/P EST MOD 30 MIN: CPT | Mod: 25 | Performed by: NURSE PRACTITIONER

## 2018-09-26 PROCEDURE — 90686 IIV4 VACC NO PRSV 0.5 ML IM: CPT | Performed by: INTERNAL MEDICINE

## 2018-09-26 PROCEDURE — 99406 BEHAV CHNG SMOKING 3-10 MIN: CPT | Performed by: NURSE PRACTITIONER

## 2018-09-26 PROCEDURE — 99214 OFFICE O/P EST MOD 30 MIN: CPT | Performed by: INTERNAL MEDICINE

## 2018-09-26 PROCEDURE — 90686 IIV4 VACC NO PRSV 0.5 ML IM: CPT

## 2018-09-26 PROCEDURE — 11042 DBRDMT SUBQ TIS 1ST 20SQCM/<: CPT

## 2018-09-26 PROCEDURE — 11042 DBRDMT SUBQ TIS 1ST 20SQCM/<: CPT | Performed by: NURSE PRACTITIONER

## 2018-09-26 PROCEDURE — 90471 IMMUNIZATION ADMIN: CPT | Performed by: INTERNAL MEDICINE

## 2018-09-26 ASSESSMENT — ENCOUNTER SYMPTOMS
VOMITING: 0
FALLS: 0
PALPITATIONS: 0
BACK PAIN: 0
NERVOUS/ANXIOUS: 0
NAUSEA: 0
CHILLS: 0
CLAUDICATION: 0
WEAKNESS: 0
DEPRESSION: 0
EYES NEGATIVE: 1
FEVER: 0
WHEEZING: 0
COUGH: 0
DIAPHORESIS: 0
SHORTNESS OF BREATH: 0

## 2018-09-26 ASSESSMENT — PAIN SCALES - GENERAL: PAINLEVEL: 6=MODERATE PAIN
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
## 2018-09-26 NOTE — NON-PROVIDER
9/26/2018: Patient given prescription for orthotic shoes with inserts from [facility]. Also gave patient [facility] Scheduling phone #[phone], instructed patient to call and schedule arterial studies.

## 2018-09-27 PROBLEM — R60.0 LOWER EXTREMITY EDEMA: Status: RESOLVED | Noted: 2018-07-11 | Resolved: 2018-09-27

## 2018-09-27 NOTE — ASSESSMENT & PLAN NOTE
Patient is currently smoking about 4-5 cigarettes a day.  He has been encouraged by wound care to quit and he is feeling ready to quit at this time.  He would like to try nicotine patches.  He has never tried to quit previously.

## 2018-09-27 NOTE — ASSESSMENT & PLAN NOTE
Recent blood work came back with borderline low iron levels.  Patient states that he did not do his stool test correctly and was notified in the mail that he needs to resubmit it.  He denies any bright red blood in his bowel movements.  His B12 and folate were also checked and were normal.

## 2018-09-27 NOTE — ASSESSMENT & PLAN NOTE
Patient states that he is being cared for at wound care now by a foot specialist.  He was recently given a prescription for orthotic shoes and went yesterday to have them fitted.  The orthotist also recommended a brace which is pending the approval of his wound care/foot specialist.  He still has an appointment with podiatry as well for general foot care and nail care scheduled for December.

## 2018-09-27 NOTE — ASSESSMENT & PLAN NOTE
Patient reports the right shoulder has been bothering him a little bit but he is doing physical therapy which has become more intense lately.  He has regained his full range of motion in both shoulders.  He would like to have another injection done on the right shoulder but his last was about 2 months ago.

## 2018-09-27 NOTE — ASSESSMENT & PLAN NOTE
Patient has an appointment scheduled with GI to establish care and start on treatment in November.  Denies abdominal swelling, jaundice, lower extremity swelling, abdominal pain.

## 2018-09-27 NOTE — PROGRESS NOTES
Subjective:   Goran Chavez is a 63 y.o. male here today for follow-up on smoking, blood work, hepatitis C, shoulder pain    Charcot's joint of left foot, non-diabetic  Patient states that he is being cared for at wound care now by a foot specialist.  He was recently given a prescription for orthotic shoes and went yesterday to have them fitted.  The orthotist also recommended a brace which is pending the approval of his wound care/foot specialist.  He still has an appointment with podiatry as well for general foot care and nail care scheduled for December.    Chronic hepatitis C without hepatic coma (HCC)  Patient has an appointment scheduled with GI to establish care and start on treatment in November.  Denies abdominal swelling, jaundice, lower extremity swelling, abdominal pain.    Macrocytic anemia  Recent blood work came back with borderline low iron levels.  Patient states that he did not do his stool test correctly and was notified in the mail that he needs to resubmit it.  He denies any bright red blood in his bowel movements.  His B12 and folate were also checked and were normal.    Right rotator cuff tendonitis  Patient reports the right shoulder has been bothering him a little bit but he is doing physical therapy which has become more intense lately.  He has regained his full range of motion in both shoulders.  He would like to have another injection done on the right shoulder but his last was about 2 months ago.    Tobacco use  Patient is currently smoking about 4-5 cigarettes a day.  He has been encouraged by wound care to quit and he is feeling ready to quit at this time.  He would like to try nicotine patches.  He has never tried to quit previously.       Current medicines (including changes today)  Current Outpatient Prescriptions   Medication Sig Dispense Refill   • nicotine (NICODERM) 7 MG/24HR PATCH 24 HR Apply 1 Patch to skin as directed every 24 hours. 30 Patch 0   • citalopram (CELEXA)  "20 MG Tab Take 1 Tab by mouth every day. 30 Tab 5   • propranolol (INDERAL) 10 MG Tab Take 1 Tab by mouth 2 times a day. 60 Tab 3   • aspirin EC (ECOTRIN) 81 MG Tablet Delayed Response Take 1 Tab by mouth every day. 30 Tab 11   • amLODIPine (NORVASC) 5 MG Tab Take 1 Tab by mouth every day. 30 Tab 3   • gabapentin (NEURONTIN) 300 MG Cap Take 1 Cap by mouth 2 Times a Day. 60 Cap 3   • atorvastatin (LIPITOR) 40 MG Tab Take 1 Tab by mouth every bedtime. 90 Tab 3     No current facility-administered medications for this visit.      He  has a past medical history of Anxiety; Charcot's joint of left foot, non-diabetic (3/21/2016); Chronic congestive heart failure (HCC) (11/16/2017); Hepatitis C, chronic (HCC); Hypertension; Migraine; Polysubstance abuse (3/8/2018); Tobacco use (4/18/2016); Ulcer of left lower extremity with necrosis of muscle (HCC) (3/21/2016); and Venous stasis ulcer ([facility]) (2017).    ROS   As above in HPI  Denies fevers, chills  Denies chest pain, shortness of breath     Objective:     Blood pressure 118/78, pulse 92, temperature 36.6 °C (97.9 °F), temperature source Temporal, resp. rate 16, height 1.829 m (6' 0.01\"), weight 74.1 kg (163 lb 6.4 oz), SpO2 94 %. Body mass index is 22.16 kg/m².   Physical Exam:  Constitutional: Alert, no distress.  Skin: Warm, dry, good turgor, no rashes in visible areas.  Eye: Equal, round and reactive, conjunctiva clear, lids normal.  Psych: Alert and oriented x3, normal affect and mood.      Results and Imaging:      Ref. Range 8/15/2018 13:50   Iron Latest Ref Range: 50 - 180 ug/dL 79   Total Iron Binding Latest Ref Range: 250 - 450 ug/dL 435   % Saturation Latest Ref Range: 15 - 55 % 18   Vitamin B12 -True Cobalamin Latest Ref Range: 211 - 911 pg/mL 524   Ferritin Latest Ref Range: 22.0 - 322.0 ng/mL 60.5   Folate -Folic Acid Latest Ref Range: >4.0 ng/mL >24.0       Assessment and Plan:   The following treatment plan was discussed    1. Macrocytic anemia  Normal folate " and B12 on recent testing.  Is likely secondary to chronic alcohol use and may have a slow GI bleed.  I have given him a new stool test to resubmit.  He is also going to establish with GI in November so if it is positive, he can pursue further intervention with them.  - OCCULT BLOOD FECES IMMUNOASSAY; Future    2. Tobacco use  Desires to quit at this time, currently about 1/4 pack a day or less.  We will start him on nicotine patches and education was given about not smoking while wearing the patch, changing the patch daily, and rotating the site of the patch daily.  - nicotine (NICODERM) 7 MG/24HR PATCH 24 HR; Apply 1 Patch to skin as directed every 24 hours.  Dispense: 30 Patch; Refill: 0    3. Need for vaccination  - Flu Quad Inj >3 Year Pre-Filled PF    4. Charcot's joint of left foot, non-diabetic  Currently following with a foot specialist through his wound care.  He has been fitted with orthotics.  He will also follow-up with podiatry in December.  -Follow-up orthotics and podiatry    5. Chronic hepatitis C without hepatic coma (HCC)  Has an appointment to establish with GI in November.  He will follow-up with them for treatment.    6. Right rotator cuff tendonitis  Discussed that he should continue physical therapy.  It is a little soon to repeat his injection however if he is still having pain a month from now, he can return to clinic for repeat at that time.  -Continue PT  -Consider injection in 1 month if still having pain and/or significantly limited range of motion        Followup: Return in about 3 months (around 12/26/2018), or if symptoms worsen or fail to improve.

## 2018-10-03 ENCOUNTER — NON-PROVIDER VISIT (OUTPATIENT)
Dept: WOUND CARE | Facility: MEDICAL CENTER | Age: 63
End: 2018-10-03
Attending: NURSE PRACTITIONER
Payer: MEDICAID

## 2018-10-03 PROCEDURE — 97597 DBRDMT OPN WND 1ST 20 CM/<: CPT

## 2018-10-03 NOTE — PATIENT INSTRUCTIONS
Avoid prolonged standing or sitting without elevating your legs.  - Apply tubigrip to your legs ending 2 fingers below back of knee without wrinkles.   Should you experience any significant changes in your wound(s), such as infection (redness, swelling, localized heat, increased pain, fever > 101 F, chills) or have any questions regarding your home care instructions, please contact the wound center at [phone]. If after hours, contact your primary care physician or go to the hospital emergency room.   Keep dressing clean, dry and covered while bathing. Only change dressing if it becomes over saturated, soiled or falls off.

## 2018-10-10 ENCOUNTER — NON-PROVIDER VISIT (OUTPATIENT)
Dept: WOUND CARE | Facility: MEDICAL CENTER | Age: 63
End: 2018-10-10
Attending: NURSE PRACTITIONER
Payer: MEDICAID

## 2018-10-10 ENCOUNTER — HOSPITAL ENCOUNTER (OUTPATIENT)
Dept: RADIOLOGY | Facility: MEDICAL CENTER | Age: 63
End: 2018-10-10
Attending: NURSE PRACTITIONER
Payer: MEDICAID

## 2018-10-10 PROCEDURE — 97597 DBRDMT OPN WND 1ST 20 CM/<: CPT

## 2018-10-10 NOTE — PATIENT INSTRUCTIONS
-Keep dressings clean, dry and covered while bathing. Only change dressings if they become over saturated, soiled or fall off.     -Avoid prolonged standing or sitting without elevating your legs.    -Remove your compression wraps if you have severe pain, severe swelling, numbness, color change, or temperature change in your toes.     -Should you experience any significant changes in your wound(s), such as infection (redness, swelling, localized heat, increased pain, fever > 101 F, chills) or have any questions regarding your home care instructions, please contact the wound center at [phone]. If after hours, contact your primary care physician or go to the hospital emergency room.

## 2018-10-12 ENCOUNTER — TELEPHONE (OUTPATIENT)
Dept: MEDICAL GROUP | Facility: MEDICAL CENTER | Age: 63
End: 2018-10-12

## 2018-10-12 RX ORDER — FERROUS SULFATE 325(65) MG
325 TABLET ORAL DAILY
Qty: 30 TAB | Refills: 5 | Status: SHIPPED | OUTPATIENT
Start: 2018-10-12 | End: 2019-09-18

## 2018-10-17 ENCOUNTER — NON-PROVIDER VISIT (OUTPATIENT)
Dept: WOUND CARE | Facility: MEDICAL CENTER | Age: 63
End: 2018-10-17
Attending: NURSE PRACTITIONER
Payer: MEDICAID

## 2018-10-17 ENCOUNTER — HOSPITAL ENCOUNTER (OUTPATIENT)
Dept: RADIOLOGY | Facility: MEDICAL CENTER | Age: 63
End: 2018-10-17
Attending: NURSE PRACTITIONER
Payer: MEDICAID

## 2018-10-17 DIAGNOSIS — L97.929 VENOUS STASIS ULCER OF LEFT LOWER EXTREMITY (HCC): ICD-10-CM

## 2018-10-17 DIAGNOSIS — I73.9 ARTERIAL INSUFFICIENCY OF LOWER EXTREMITY (HCC): ICD-10-CM

## 2018-10-17 DIAGNOSIS — Z72.0 TOBACCO USE: ICD-10-CM

## 2018-10-17 DIAGNOSIS — I83.029 VENOUS STASIS ULCER OF LEFT LOWER EXTREMITY (HCC): ICD-10-CM

## 2018-10-17 PROCEDURE — 93922 UPR/L XTREMITY ART 2 LEVELS: CPT | Mod: 26 | Performed by: SURGERY

## 2018-10-17 PROCEDURE — 93922 UPR/L XTREMITY ART 2 LEVELS: CPT

## 2018-10-17 PROCEDURE — 97597 DBRDMT OPN WND 1ST 20 CM/<: CPT

## 2018-10-17 NOTE — PATIENT INSTRUCTIONS
Discussed POC, wound care rationale, dressing selection and to return to [facility] twice weekly for appts. Pt instructed to keep dressing CDI and to return to ER for any s/s infection, n/v, fever or chills. Pt verbalized understanding to all.    Change dressing one to two times per week at home, in between wound clinic appts as discussed.    1. Remove old dressing  2. Cleanse with saline and gauze  3. Apply zinc paste around wound  4. Apply honey gel to wound bed  5. Cover with aquacel extra  6. Secure with roll gauze and tape  7. Apply tubigrip

## 2018-10-24 ENCOUNTER — NON-PROVIDER VISIT (OUTPATIENT)
Dept: WOUND CARE | Facility: MEDICAL CENTER | Age: 63
End: 2018-10-24
Attending: NURSE PRACTITIONER
Payer: MEDICAID

## 2018-10-24 PROCEDURE — 97597 DBRDMT OPN WND 1ST 20 CM/<: CPT

## 2018-10-24 NOTE — PROCEDURES
cswd using scalpel to remove ~9cm2 thick slough from wound bed.  Pt denied pain and tolerated procedure well.

## 2018-10-24 NOTE — PATIENT INSTRUCTIONS
Continue changing dressing at home as discussed.    Discussed POC, wound care rationale, dressing selection and to return to [facility] twice weekly for appts. Pt instructed to keep dressing CDI and to return to ER for any s/s infection, n/v, fever or chills. Pt verbalized understanding to all.

## 2018-10-31 ENCOUNTER — NON-PROVIDER VISIT (OUTPATIENT)
Dept: WOUND CARE | Facility: MEDICAL CENTER | Age: 63
End: 2018-10-31
Attending: NURSE PRACTITIONER
Payer: MEDICAID

## 2018-10-31 PROCEDURE — 97597 DBRDMT OPN WND 1ST 20 CM/<: CPT

## 2018-10-31 NOTE — PATIENT INSTRUCTIONS
-Keep dressings clean, dry and covered while bathing. Only change dressings if they become over saturated, soiled or fall off.     -Avoid prolonged standing or sitting without elevating your legs.    -Remove your compression garment if you have severe pain, severe swelling, numbness, color change, or temperature change in your toes. If you need to remove your compression garment, do so by unrolling it. Do not cut the compression garment off, this is to prevent cutting yourself on accident.    -Should you experience any significant changes in your wound(s), such as infection (redness, swelling, localized heat, increased pain, fever > 101 F, chills) or have any questions regarding your home care instructions, please contact the wound center at [phone]. If after hours, contact your primary care physician or go to the hospital emergency room.

## 2018-11-07 ENCOUNTER — NON-PROVIDER VISIT (OUTPATIENT)
Dept: WOUND CARE | Facility: MEDICAL CENTER | Age: 63
End: 2018-11-07
Attending: NURSE PRACTITIONER
Payer: MEDICAID

## 2018-11-07 PROCEDURE — 97597 DBRDMT OPN WND 1ST 20 CM/<: CPT

## 2018-11-07 NOTE — PATIENT INSTRUCTIONS
Should you experience any significant changes in your wound(s) such as infection (redness, swelling, localized heat, increased pain, fever >101 F, chills) or have any questions regarding your home care instructions, please contact the wound center [phone]. If after hours, contact your primary care physician or go the hospital emergency room.  Keep dressing clean and dry and cover while bathing. Only change dressing if over saturated, soiled or its falling off or every 72 hours with hydrofera blue & tape. If shower on day you arrive to [facility], cover with moist gauze post showering instead of leaving wound open to air. Focus on eating protein & drinking water for wound healing.

## 2018-11-14 ENCOUNTER — APPOINTMENT (OUTPATIENT)
Dept: WOUND CARE | Facility: MEDICAL CENTER | Age: 63
End: 2018-11-14
Attending: NURSE PRACTITIONER
Payer: MEDICAID
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
## 2018-11-21 ENCOUNTER — NON-PROVIDER VISIT (OUTPATIENT)
Dept: WOUND CARE | Facility: MEDICAL CENTER | Age: 63
End: 2018-11-21
Attending: NURSE PRACTITIONER
Payer: MEDICAID

## 2018-11-21 ENCOUNTER — OFFICE VISIT (OUTPATIENT)
Dept: MEDICAL GROUP | Facility: MEDICAL CENTER | Age: 63
End: 2018-11-21
Attending: INTERNAL MEDICINE
Payer: MEDICAID

## 2018-11-21 VITALS
TEMPERATURE: 98.5 F | HEART RATE: 60 BPM | WEIGHT: 174.5 LBS | OXYGEN SATURATION: 96 % | HEIGHT: 72 IN | DIASTOLIC BLOOD PRESSURE: 78 MMHG | RESPIRATION RATE: 16 BRPM | BODY MASS INDEX: 23.63 KG/M2 | SYSTOLIC BLOOD PRESSURE: 122 MMHG

## 2018-11-21 DIAGNOSIS — M75.81 RIGHT ROTATOR CUFF TENDONITIS: ICD-10-CM

## 2018-11-21 PROCEDURE — 20610 DRAIN/INJ JOINT/BURSA W/O US: CPT | Performed by: INTERNAL MEDICINE

## 2018-11-21 PROCEDURE — 99213 OFFICE O/P EST LOW 20 MIN: CPT | Performed by: INTERNAL MEDICINE

## 2018-11-21 PROCEDURE — 97597 DBRDMT OPN WND 1ST 20 CM/<: CPT

## 2018-11-21 RX ORDER — TRIAMCINOLONE ACETONIDE 40 MG/ML
40 INJECTION, SUSPENSION INTRA-ARTICULAR; INTRAMUSCULAR ONCE
Status: COMPLETED | OUTPATIENT
Start: 2018-11-21 | End: 2018-11-21

## 2018-11-21 RX ADMIN — TRIAMCINOLONE ACETONIDE 40 MG: 40 INJECTION, SUSPENSION INTRA-ARTICULAR; INTRAMUSCULAR at 14:29

## 2018-11-21 ASSESSMENT — PAIN SCALES - GENERAL: PAINLEVEL: 6=MODERATE PAIN

## 2018-11-21 NOTE — PROGRESS NOTES
Subjective:   Goran Chavez is a 63 y.o. male here today for follow-up shoulder pain and injection    Right rotator cuff tendonitis  He presents today for follow-up on his right shoulder pain.  He continues to work with physical therapy on a weekly basis.  He is still having pain when he reaches overhead or abduction more than 90 degrees.  In the past, we have done a steroid injection for him which has been helpful and he would like to have a subacromial bursa injection repeated today.  He has not had any recent injuries to the shoulder joint.  He denies any swelling or redness of the joint.  The date of his last subacromial bursa injection was 7/11/18.       Current medicines (including changes today)  Current Outpatient Prescriptions   Medication Sig Dispense Refill   • amLODIPine (NORVASC) 5 MG Tab TAKE 1 TABLET BY MOUTH EVERY DAY 90 Tab 0   • ferrous sulfate 325 (65 Fe) MG tablet Take 1 Tab by mouth every day. 30 Tab 5   • citalopram (CELEXA) 20 MG Tab Take 1 Tab by mouth every day. 30 Tab 5   • propranolol (INDERAL) 10 MG Tab Take 1 Tab by mouth 2 times a day. 60 Tab 3   • aspirin EC (ECOTRIN) 81 MG Tablet Delayed Response Take 1 Tab by mouth every day. 30 Tab 11   • atorvastatin (LIPITOR) 40 MG Tab Take 1 Tab by mouth every bedtime. 90 Tab 3   • nicotine (NICODERM) 7 MG/24HR PATCH 24 HR Apply 1 Patch to skin as directed every 24 hours. (Patient not taking: Reported on 11/21/2018) 30 Patch 0   • gabapentin (NEURONTIN) 300 MG Cap Take 1 Cap by mouth 2 Times a Day. (Patient not taking: Reported on 11/21/2018) 60 Cap 3     No current facility-administered medications for this visit.      He  has a past medical history of Anxiety; Charcot's joint of left foot, non-diabetic (3/21/2016); Chronic congestive heart failure (HCC) (11/16/2017); Hepatitis C, chronic (HCC); Hypertension; Migraine; Polysubstance abuse (HCC) (3/8/2018); Tobacco use (4/18/2016); Ulcer of left lower extremity with necrosis of muscle  "([facility]) (3/21/2016); and Venous stasis ulcer ([facility]) (2017).    ROS   Denies chest pain, shortness of breath  As above in HPI     Objective:     Blood pressure 122/78, pulse 60, temperature 36.9 °C (98.5 °F), temperature source Temporal, resp. rate 16, height 1.829 m (6' 0.01\"), weight 79.2 kg (174 lb 8 oz), SpO2 96 %. Body mass index is 23.66 kg/m².   Physical Exam:  Constitutional: Alert, no distress.  Skin: Warm, dry, good turgor, no rashes in visible areas.  Eye: Equal, round and reactive, conjunctiva clear, lids normal.  Psych: Alert and oriented x3, normal affect and mood.  MSK: Right shoulder with active range of motion to 90 degrees of shoulder hiking after that, full abduction but with pain, bony landmarks of the shoulder easily palpable, no effusion or overlying warmth    PROCEDURE NOTE:     Discussed risks and benefits of procedure with patient.  Patient verbally agreed to procedure. The right posterior shoulder was prepped with alcohol solution. Using a 23 guage needle the glenhumoral joint was injected with 2 cc 1% xylocaine and 1 cc of kenalog 40 mg under the posterior aspect of the acromion. The area was cleansed and dressed with a band aid.      Assessment and Plan:   The following treatment plan was discussed    1. Right rotator cuff tendonitis  We will treat with a Kenalog shot again today.  We discussed that if this does not resolve his symptoms in conjunction with physical therapy, it would be wise to get an MRI and possibly have him see orthopedics to make sure there is nothing surgical that needs to be fixed.  -Continue follow-up with physical therapy  - triamcinolone acetonide (KENALOG-40) injection 40 mg; 1 mL by Intra-articular route Once.  -Reevaluate at next appointment in January, if still having considerable pain or decreased range of motion we will order a MRI of the shoulder and likely refer to orthopedics if there are any acute findings      Followup: Return in about 6 weeks (around " 1/2/2019), or if symptoms worsen or fail to improve.

## 2018-11-21 NOTE — PROCEDURES
CSWD of L posterior LE wound using a curette to remove thick biofilm from wound bed. Area debrided ~ 11.0cm2

## 2018-11-21 NOTE — ASSESSMENT & PLAN NOTE
He presents today for follow-up on his right shoulder pain.  He continues to work with physical therapy on a weekly basis.  He is still having pain when he reaches overhead or abduction more than 90 degrees.  In the past, we have done a steroid injection for him which has been helpful and he would like to have a subacromial bursa injection repeated today.  He has not had any recent injuries to the shoulder joint.  He denies any swelling or redness of the joint.  The date of his last subacromial bursa injection was 7/11/18.

## 2018-11-21 NOTE — PATIENT INSTRUCTIONS
Should you experience any significant changes in your wound(s) such as infection (redness, swelling, localized heat, increased pain, fever >101 F, chills) or have any questions regarding your home care instructions, please contact the wound center [phone]. If after hours, contact your primary care physician or go the hospital emergency room.  Keep dressing clean and dry and cover while bathing. Only change dressing if over saturated, soiled or its falling off.

## 2018-11-29 ENCOUNTER — OFFICE VISIT (OUTPATIENT)
Dept: WOUND CARE | Facility: MEDICAL CENTER | Age: 63
End: 2018-11-29
Attending: NURSE PRACTITIONER
Payer: MEDICAID

## 2018-11-29 VITALS
TEMPERATURE: 98.8 F | HEART RATE: 72 BPM | SYSTOLIC BLOOD PRESSURE: 153 MMHG | DIASTOLIC BLOOD PRESSURE: 104 MMHG | RESPIRATION RATE: 16 BRPM | OXYGEN SATURATION: 97 %

## 2018-11-29 DIAGNOSIS — L97.922 SKIN ULCER OF LEFT LOWER LEG WITH FAT LAYER EXPOSED (HCC): Primary | ICD-10-CM

## 2018-11-29 DIAGNOSIS — Z72.0 TOBACCO USE: ICD-10-CM

## 2018-11-29 DIAGNOSIS — I87.8 CHRONIC VENOUS STASIS: ICD-10-CM

## 2018-11-29 PROCEDURE — 11042 DBRDMT SUBQ TIS 1ST 20SQCM/<: CPT | Performed by: NURSE PRACTITIONER

## 2018-11-29 PROCEDURE — 11042 DBRDMT SUBQ TIS 1ST 20SQCM/<: CPT

## 2018-11-29 ASSESSMENT — ENCOUNTER SYMPTOMS
NERVOUS/ANXIOUS: 0
FEVER: 0
WHEEZING: 0
WEAKNESS: 0
NAUSEA: 0
VOMITING: 0
BACK PAIN: 0
SHORTNESS OF BREATH: 0
EYES NEGATIVE: 1
PALPITATIONS: 0
DEPRESSION: 0
COUGH: 0
CLAUDICATION: 0
FALLS: 0
DIAPHORESIS: 0
CHILLS: 0

## 2018-11-29 NOTE — PATIENT INSTRUCTIONS
-Keep dressings clean, dry and covered while bathing. Only change dressings if they become over saturated, soiled or fall off.     -Avoid prolonged standing or sitting without elevating your legs.    -Remove your compression wraps if you have severe pain, severe swelling, numbness, color change, or temperature change in your toes. If you need to remove your compression wraps, do so by unrolling them. Do not cut the compression wraps off, this is to prevent cutting yourself on accident.    -Should you experience any significant changes in your wound(s), such as infection (redness, swelling, localized heat, increased pain, fever > 101 F, chills) or have any questions regarding your home care instructions, please contact the wound center at [phone]. If after hours, contact your primary care physician or go to the hospital emergency room.
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
## 2018-12-04 ENCOUNTER — OFFICE VISIT (OUTPATIENT)
Dept: WOUND CARE | Facility: MEDICAL CENTER | Age: 63
End: 2018-12-04
Attending: NURSE PRACTITIONER
Payer: MEDICAID

## 2018-12-04 NOTE — PROGRESS NOTES
No call/no show for appt today.  VM left for pt notifying him of missed visit and next scheduled visit.    Noted to have next appt scheduled for tomorrow.  Scheduling mistake?

## 2018-12-05 ENCOUNTER — HOSPITAL ENCOUNTER (OUTPATIENT)
Dept: LAB | Facility: MEDICAL CENTER | Age: 63
End: 2018-12-05
Attending: INTERNAL MEDICINE
Payer: MEDICAID

## 2018-12-05 ENCOUNTER — HOSPITAL ENCOUNTER (OUTPATIENT)
Dept: RADIOLOGY | Facility: MEDICAL CENTER | Age: 63
End: 2018-12-05
Attending: INTERNAL MEDICINE
Payer: MEDICAID

## 2018-12-05 DIAGNOSIS — D64.9 ANEMIA, UNSPECIFIED TYPE: ICD-10-CM

## 2018-12-05 DIAGNOSIS — B18.2 CHRONIC HEPATITIS C WITH HEPATIC COMA (HCC): ICD-10-CM

## 2018-12-05 DIAGNOSIS — R93.819 ABNORMAL FINDING ON DIAGNOSTIC IMAGING OF TESTICLE: ICD-10-CM

## 2018-12-05 LAB
25(OH)D3 SERPL-MCNC: 33 NG/ML (ref 30–100)
ALBUMIN SERPL BCP-MCNC: 3.8 G/DL (ref 3.2–4.9)
ALBUMIN/GLOB SERPL: 1.2 G/DL
ALP SERPL-CCNC: 61 U/L (ref 30–99)
ALT SERPL-CCNC: 33 U/L (ref 2–50)
AMMONIA PLAS-SCNC: 35 UMOL/L (ref 11–45)
ANION GAP SERPL CALC-SCNC: 5 MMOL/L (ref 0–11.9)
AST SERPL-CCNC: 25 U/L (ref 12–45)
BASOPHILS # BLD AUTO: 0.7 % (ref 0–1.8)
BASOPHILS # BLD: 0.06 K/UL (ref 0–0.12)
BILIRUB SERPL-MCNC: 0.5 MG/DL (ref 0.1–1.5)
BUN SERPL-MCNC: 23 MG/DL (ref 8–22)
CALCIUM SERPL-MCNC: 8.7 MG/DL (ref 8.5–10.5)
CANCER AG19-9 SERPL-ACNC: 2.8 U/ML (ref 0–35)
CEA SERPL-MCNC: 4.6 NG/ML (ref 0–3)
CHLORIDE SERPL-SCNC: 108 MMOL/L (ref 96–112)
CO2 SERPL-SCNC: 27 MMOL/L (ref 20–33)
CREAT SERPL-MCNC: 0.91 MG/DL (ref 0.5–1.4)
EOSINOPHIL # BLD AUTO: 0.17 K/UL (ref 0–0.51)
EOSINOPHIL NFR BLD: 2 % (ref 0–6.9)
ERYTHROCYTE [DISTWIDTH] IN BLOOD BY AUTOMATED COUNT: 59 FL (ref 35.9–50)
ETHANOL BLD-MCNC: 0 G/DL
GGT SERPL-CCNC: 24 U/L (ref 7–51)
GLOBULIN SER CALC-MCNC: 3.2 G/DL (ref 1.9–3.5)
GLUCOSE SERPL-MCNC: 98 MG/DL (ref 65–99)
HBV CORE AB SERPL QL IA: NEGATIVE
HBV SURFACE AB SERPL IA-ACNC: <3.1 MIU/ML (ref 0–10)
HCT VFR BLD AUTO: 41.7 % (ref 42–52)
HGB BLD-MCNC: 13.1 G/DL (ref 14–18)
IMM GRANULOCYTES # BLD AUTO: 0.03 K/UL (ref 0–0.11)
IMM GRANULOCYTES NFR BLD AUTO: 0.4 % (ref 0–0.9)
INR PPP: 0.99 (ref 0.87–1.13)
LYMPHOCYTES # BLD AUTO: 0.97 K/UL (ref 1–4.8)
LYMPHOCYTES NFR BLD: 11.6 % (ref 22–41)
MCH RBC QN AUTO: 30.8 PG (ref 27–33)
MCHC RBC AUTO-ENTMCNC: 31.4 G/DL (ref 33.7–35.3)
MCV RBC AUTO: 98.1 FL (ref 81.4–97.8)
MONOCYTES # BLD AUTO: 0.62 K/UL (ref 0–0.85)
MONOCYTES NFR BLD AUTO: 7.4 % (ref 0–13.4)
NEUTROPHILS # BLD AUTO: 6.49 K/UL (ref 1.82–7.42)
NEUTROPHILS NFR BLD: 77.9 % (ref 44–72)
NRBC # BLD AUTO: 0 K/UL
NRBC BLD-RTO: 0 /100 WBC
PLATELET # BLD AUTO: 294 K/UL (ref 164–446)
PLATELET # BLD AUTO: 322 K/UL (ref 164–446)
PMV BLD AUTO: 9.6 FL (ref 9–12.9)
POTASSIUM SERPL-SCNC: 4.2 MMOL/L (ref 3.6–5.5)
PROT SERPL-MCNC: 7 G/DL (ref 6–8.2)
PROTHROMBIN TIME: 13.2 SEC (ref 12–14.6)
RBC # BLD AUTO: 4.25 M/UL (ref 4.7–6.1)
SODIUM SERPL-SCNC: 140 MMOL/L (ref 135–145)
WBC # BLD AUTO: 8.3 K/UL (ref 4.8–10.8)

## 2018-12-05 PROCEDURE — 87902 NFCT AGT GNTYP ALYS HEP C: CPT

## 2018-12-05 PROCEDURE — 86708 HEPATITIS A ANTIBODY: CPT

## 2018-12-05 PROCEDURE — 83516 IMMUNOASSAY NONANTIBODY: CPT | Mod: 91

## 2018-12-05 PROCEDURE — 86706 HEP B SURFACE ANTIBODY: CPT

## 2018-12-05 PROCEDURE — 85610 PROTHROMBIN TIME: CPT

## 2018-12-05 PROCEDURE — 85049 AUTOMATED PLATELET COUNT: CPT

## 2018-12-05 PROCEDURE — 82784 ASSAY IGA/IGD/IGG/IGM EACH: CPT

## 2018-12-05 PROCEDURE — 84590 ASSAY OF VITAMIN A: CPT

## 2018-12-05 PROCEDURE — 84460 ALANINE AMINO (ALT) (SGPT): CPT

## 2018-12-05 PROCEDURE — 84520 ASSAY OF UREA NITROGEN: CPT

## 2018-12-05 PROCEDURE — 71046 X-RAY EXAM CHEST 2 VIEWS: CPT

## 2018-12-05 PROCEDURE — 82107 ALPHA-FETOPROTEIN L3: CPT

## 2018-12-05 PROCEDURE — 80307 DRUG TEST PRSMV CHEM ANLYZR: CPT

## 2018-12-05 PROCEDURE — 82977 ASSAY OF GGT: CPT

## 2018-12-05 PROCEDURE — 82306 VITAMIN D 25 HYDROXY: CPT

## 2018-12-05 PROCEDURE — 86038 ANTINUCLEAR ANTIBODIES: CPT

## 2018-12-05 PROCEDURE — 82140 ASSAY OF AMMONIA: CPT

## 2018-12-05 PROCEDURE — 84450 TRANSFERASE (AST) (SGOT): CPT

## 2018-12-05 PROCEDURE — 82378 CARCINOEMBRYONIC ANTIGEN: CPT

## 2018-12-05 PROCEDURE — 85025 COMPLETE CBC W/AUTO DIFF WBC: CPT

## 2018-12-05 PROCEDURE — 83883 ASSAY NEPHELOMETRY NOT SPEC: CPT

## 2018-12-05 PROCEDURE — 80053 COMPREHEN METABOLIC PANEL: CPT

## 2018-12-05 PROCEDURE — 86704 HEP B CORE ANTIBODY TOTAL: CPT

## 2018-12-05 PROCEDURE — 86301 IMMUNOASSAY TUMOR CA 19-9: CPT

## 2018-12-05 PROCEDURE — 36415 COLL VENOUS BLD VENIPUNCTURE: CPT

## 2018-12-06 LAB
AMPHETAMINES UR QL: POSITIVE NG/ML
BARBITURATES UR QL: NEGATIVE NG/ML
BENZODIAZ UR QL: NEGATIVE NG/ML
CANNABINOIDS UR QL SCN: POSITIVE NG/ML
COCAINE UR QL: NEGATIVE NG/ML
DRUG SCREEN COMMENT UR-IMP: NORMAL
HAV AB SER QL IA: NEGATIVE
IGA SERPL-MCNC: 197 MG/DL (ref 68–408)
IGG SERPL-MCNC: 1680 MG/DL (ref 768–1632)
MDMA CTO UR SCN-MCNC: NEGATIVE NG/ML
METHADONE UR QL: NEGATIVE NG/ML
MITOCHONDRIA M2 IGG SER-ACNC: 5.3 UNITS (ref 0–20)
NUCLEAR IGG SER QL IA: NORMAL
OPIATES UR QL: NEGATIVE NG/ML
OXYCODONE CTO UR SCN-MCNC: NEGATIVE NG/ML
PCP UR QL SCN: NEGATIVE NG/ML
PROPOXYPH UR QL: NEGATIVE NG/ML

## 2018-12-07 LAB
A2 MACROGLOB SERPL-MCNC: 353 MG/DL (ref 131–293)
ALT SERPL-CCNC: 44 U/L (ref 5–50)
ANNOTATION COMMENT IMP: ABNORMAL
ANNOTATION COMMENT IMP: NORMAL
AST SERPL-CCNC: 35 U/L (ref 9–50)
BUN SERPL-SCNC: 24 MG/DL (ref 7–20)
CIRRHOMETER PT SCORE Q4850: 0.01
FIBROSIS STAGE SERPL QL: ABNORMAL
GGT SERPL-CCNC: 23 U/L (ref 7–51)
GLIADIN PEPTIDE+TTG IGA+IGG SER QL IA: 8 UNITS (ref 0–19)
INFLAMETER META CLASS Q4853: ABNORMAL
INFLAMETER PT SCORE Q4852: 0.45
LIVER FIBR SCORE SERPL CALC.FIBROMETER: 0.66
PATHOLOGY STUDY: ABNORMAL
PROTHROM ACT/NOR PPP: 89 % (ref 90–120)
RETINYL PALMITATE SERPL-MCNC: <0.02 MG/L (ref 0–0.1)
VIT A SERPL-MCNC: 0.53 MG/L (ref 0.3–1.2)

## 2018-12-08 LAB
AFP L3 MFR SERPL: <0.5 % (ref 0–9.9)
AFP SERPL-MCNC: 1 NG/ML (ref 0–15)
HCV GENTYP SERPL NAA+PROBE: NORMAL
SMA IGG SER-ACNC: 25 UNITS (ref 0–19)
SMOOTH MUSCLE IGG TITR SER: ABNORMAL {TITER}

## 2018-12-12 ENCOUNTER — OFFICE VISIT (OUTPATIENT)
Dept: WOUND CARE | Facility: MEDICAL CENTER | Age: 63
End: 2018-12-12
Attending: NURSE PRACTITIONER
Payer: MEDICAID

## 2018-12-12 ENCOUNTER — HOSPITAL ENCOUNTER (OUTPATIENT)
Facility: MEDICAL CENTER | Age: 63
End: 2018-12-12
Attending: NURSE PRACTITIONER
Payer: MEDICAID

## 2018-12-12 VITALS
SYSTOLIC BLOOD PRESSURE: 131 MMHG | DIASTOLIC BLOOD PRESSURE: 92 MMHG | RESPIRATION RATE: 16 BRPM | HEART RATE: 76 BPM | OXYGEN SATURATION: 96 % | TEMPERATURE: 99.1 F

## 2018-12-12 DIAGNOSIS — L08.9 WOUND INFECTION: ICD-10-CM

## 2018-12-12 DIAGNOSIS — I87.8 CHRONIC VENOUS STASIS: ICD-10-CM

## 2018-12-12 DIAGNOSIS — T14.8XXA WOUND INFECTION: ICD-10-CM

## 2018-12-12 DIAGNOSIS — L97.922 SKIN ULCER OF LEFT LOWER LEG WITH FAT LAYER EXPOSED (HCC): Primary | ICD-10-CM

## 2018-12-12 DIAGNOSIS — Z72.0 TOBACCO USE: ICD-10-CM

## 2018-12-12 PROCEDURE — 87070 CULTURE OTHR SPECIMN AEROBIC: CPT

## 2018-12-12 PROCEDURE — 11042 DBRDMT SUBQ TIS 1ST 20SQCM/<: CPT | Performed by: NURSE PRACTITIONER

## 2018-12-12 PROCEDURE — 87205 SMEAR GRAM STAIN: CPT

## 2018-12-12 PROCEDURE — 87186 SC STD MICRODIL/AGAR DIL: CPT | Mod: 91

## 2018-12-12 PROCEDURE — 87077 CULTURE AEROBIC IDENTIFY: CPT

## 2018-12-12 PROCEDURE — 11042 DBRDMT SUBQ TIS 1ST 20SQCM/<: CPT

## 2018-12-12 ASSESSMENT — ENCOUNTER SYMPTOMS
COUGH: 0
EYES NEGATIVE: 1
PALPITATIONS: 0
BACK PAIN: 0
DEPRESSION: 0
CLAUDICATION: 0
DIAPHORESIS: 0
FALLS: 0
WEAKNESS: 0
WHEEZING: 0
SHORTNESS OF BREATH: 0
NERVOUS/ANXIOUS: 0
CHILLS: 0
NAUSEA: 0
VOMITING: 0
FEVER: 0

## 2018-12-12 NOTE — PATIENT INSTRUCTIONS
Avoid prolonged standing or sitting without elevating your legs.  - Multilayer compression wrap to left leg. Do not get wet and keep on for the week. Only remove if temperature or sensation changes.   If compression needs to be removed, un-wrap it do not cut it off.     Should you experience any significant changes in your wound(s), such as infection (redness, swelling, localized heat, increased pain, fever > 101 F, chills) or have any questions regarding your home care instructions, please contact the wound center at [phone]. If after hours, contact your primary care physician or go to the hospital emergency room.   Keep dressing clean, dry and covered while bathing. Only change dressing if it becomes over saturated, soiled or falls off.

## 2018-12-13 DIAGNOSIS — T14.8XXA WOUND INFECTION: ICD-10-CM

## 2018-12-13 DIAGNOSIS — L08.9 WOUND INFECTION: ICD-10-CM

## 2018-12-13 LAB
GRAM STN SPEC: NORMAL
SIGNIFICANT IND 70042: NORMAL
SITE SITE: NORMAL
SOURCE SOURCE: NORMAL
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
## 2018-12-18 ENCOUNTER — TELEPHONE (OUTPATIENT)
Dept: WOUND CARE | Facility: MEDICAL CENTER | Age: 63
End: 2018-12-18

## 2018-12-18 ENCOUNTER — HOSPITAL ENCOUNTER (INPATIENT)
Facility: MEDICAL CENTER | Age: 63
LOS: 9 days | DRG: 593 | End: 2018-12-28
Attending: EMERGENCY MEDICINE | Admitting: INTERNAL MEDICINE
Payer: MEDICAID

## 2018-12-18 DIAGNOSIS — L08.9 CHRONIC WOUND INFECTION OF ABDOMEN, INITIAL ENCOUNTER: ICD-10-CM

## 2018-12-18 DIAGNOSIS — I87.8 CHRONIC VENOUS STASIS: ICD-10-CM

## 2018-12-18 DIAGNOSIS — L98.492 INFECTED SKIN ULCER WITH FAT LAYER EXPOSED (HCC): ICD-10-CM

## 2018-12-18 DIAGNOSIS — I73.9 PVD (PERIPHERAL VASCULAR DISEASE) (HCC): ICD-10-CM

## 2018-12-18 DIAGNOSIS — A49.9 POLYMICROBIAL BACTERIAL INFECTION: ICD-10-CM

## 2018-12-18 DIAGNOSIS — L97.922 SKIN ULCER OF LEFT LOWER LEG WITH FAT LAYER EXPOSED (HCC): ICD-10-CM

## 2018-12-18 DIAGNOSIS — D53.9 MACROCYTIC ANEMIA: ICD-10-CM

## 2018-12-18 DIAGNOSIS — L08.9 INFECTED SKIN ULCER WITH FAT LAYER EXPOSED (HCC): ICD-10-CM

## 2018-12-18 DIAGNOSIS — S31.109A CHRONIC WOUND INFECTION OF ABDOMEN, INITIAL ENCOUNTER: ICD-10-CM

## 2018-12-18 PROBLEM — F15.10 METHAMPHETAMINE ABUSE (HCC): Status: ACTIVE | Noted: 2018-12-18

## 2018-12-18 PROCEDURE — 99285 EMERGENCY DEPT VISIT HI MDM: CPT

## 2018-12-18 ASSESSMENT — PAIN SCALES - GENERAL
PAINLEVEL_OUTOF10: 5
PAINLEVEL_OUTOF10: 6

## 2018-12-18 NOTE — TELEPHONE ENCOUNTER
Reviewed wound cx results from posterior left lower leg wound. Pt with new anterior left lower leg wound. Non healing. Cellulitis noted in wound photo.   Wound cx + for MRSA, klebsiella, PSAR, E. Faecalis.   Reviewed wound photo, allergies, medications, EKG, labs.  Oral options limited to cipro and zyvox. However pt's QTc prolonged, high risk if placed on cipro in outpt setting.       Pt to go to ED to be admitted for IV abx and surgical debridement of LLE ulcers. Consult ID.  Called pt, left voice message for pt to call back.       0900: received call back from Megan, daughter. Pt easily overwhelmed with medical information. Megan states pt has MRI and CT scheduled for tomorrow with new dx of hep C, wound care appt, podiatry appt for foot and nail care on Thursday.     Informed daughter that pt will need to proceed to ED to be admitted for IV abx and surgical debridement of wound. Posterior wound increased in size, has new anterior leg wound. Increased cellulitis. Temperature elevated last week appt. Emphasized importance that pt's infection will need to be treated with IV abx. Discussed risks of not treating promptly including sepsis. Informed Megan to notify admitting MD about pt's imaging needs for hep C.    PLAN  Cancel wound care appt for tomorrow  Pt to go to ED

## 2018-12-19 ENCOUNTER — APPOINTMENT (OUTPATIENT)
Dept: RADIOLOGY | Facility: MEDICAL CENTER | Age: 63
DRG: 593 | End: 2018-12-19
Attending: EMERGENCY MEDICINE
Payer: MEDICAID

## 2018-12-19 ENCOUNTER — APPOINTMENT (OUTPATIENT)
Dept: RADIOLOGY | Facility: MEDICAL CENTER | Age: 63
End: 2018-12-19
Attending: INTERNAL MEDICINE
Payer: MEDICAID

## 2018-12-19 PROBLEM — N18.30 STAGE 3 CHRONIC KIDNEY DISEASE: Status: ACTIVE | Noted: 2018-03-07

## 2018-12-19 PROBLEM — L08.9 INFECTED ULCER OF SKIN (HCC): Status: ACTIVE | Noted: 2018-12-19

## 2018-12-19 PROBLEM — E78.5 HYPERLIPIDEMIA: Status: ACTIVE | Noted: 2018-12-19

## 2018-12-19 PROBLEM — L98.499 INFECTED ULCER OF SKIN (HCC): Status: ACTIVE | Noted: 2018-12-19

## 2018-12-19 LAB
ALBUMIN SERPL BCP-MCNC: 3.7 G/DL (ref 3.2–4.9)
ALBUMIN/GLOB SERPL: 1.1 G/DL
ALP SERPL-CCNC: 71 U/L (ref 30–99)
ALT SERPL-CCNC: 37 U/L (ref 2–50)
ANION GAP SERPL CALC-SCNC: 7 MMOL/L (ref 0–11.9)
AST SERPL-CCNC: 35 U/L (ref 12–45)
BASOPHILS # BLD AUTO: 0.8 % (ref 0–1.8)
BASOPHILS # BLD: 0.05 K/UL (ref 0–0.12)
BILIRUB SERPL-MCNC: 0.3 MG/DL (ref 0.1–1.5)
BUN SERPL-MCNC: 46 MG/DL (ref 8–22)
CALCIUM SERPL-MCNC: 8.8 MG/DL (ref 8.5–10.5)
CHLORIDE SERPL-SCNC: 105 MMOL/L (ref 96–112)
CHOLEST SERPL-MCNC: 106 MG/DL (ref 100–199)
CO2 SERPL-SCNC: 27 MMOL/L (ref 20–33)
CREAT SERPL-MCNC: 1.3 MG/DL (ref 0.5–1.4)
CRP SERPL HS-MCNC: 0.23 MG/DL (ref 0–0.75)
CRP SERPL HS-MCNC: 0.24 MG/DL (ref 0–0.75)
EOSINOPHIL # BLD AUTO: 0.21 K/UL (ref 0–0.51)
EOSINOPHIL NFR BLD: 3.2 % (ref 0–6.9)
ERYTHROCYTE [DISTWIDTH] IN BLOOD BY AUTOMATED COUNT: 57.8 FL (ref 35.9–50)
ERYTHROCYTE [SEDIMENTATION RATE] IN BLOOD BY WESTERGREN METHOD: 16 MM/HOUR (ref 0–20)
GLOBULIN SER CALC-MCNC: 3.3 G/DL (ref 1.9–3.5)
GLUCOSE SERPL-MCNC: 103 MG/DL (ref 65–99)
HCT VFR BLD AUTO: 40.9 % (ref 42–52)
HDLC SERPL-MCNC: 43 MG/DL
HGB BLD-MCNC: 13.2 G/DL (ref 14–18)
IMM GRANULOCYTES # BLD AUTO: 0.01 K/UL (ref 0–0.11)
IMM GRANULOCYTES NFR BLD AUTO: 0.2 % (ref 0–0.9)
INR PPP: 0.99 (ref 0.87–1.13)
LACTATE BLD-SCNC: 1.5 MMOL/L (ref 0.5–2)
LDLC SERPL CALC-MCNC: 43 MG/DL
LIPASE SERPL-CCNC: 25 U/L (ref 11–82)
LYMPHOCYTES # BLD AUTO: 1.51 K/UL (ref 1–4.8)
LYMPHOCYTES NFR BLD: 22.8 % (ref 22–41)
MCH RBC QN AUTO: 30.7 PG (ref 27–33)
MCHC RBC AUTO-ENTMCNC: 32.3 G/DL (ref 33.7–35.3)
MCV RBC AUTO: 95.1 FL (ref 81.4–97.8)
MONOCYTES # BLD AUTO: 0.62 K/UL (ref 0–0.85)
MONOCYTES NFR BLD AUTO: 9.4 % (ref 0–13.4)
NEUTROPHILS # BLD AUTO: 4.22 K/UL (ref 1.82–7.42)
NEUTROPHILS NFR BLD: 63.6 % (ref 44–72)
NRBC # BLD AUTO: 0 K/UL
NRBC BLD-RTO: 0 /100 WBC
PLATELET # BLD AUTO: 284 K/UL (ref 164–446)
PMV BLD AUTO: 9 FL (ref 9–12.9)
POTASSIUM SERPL-SCNC: 4.9 MMOL/L (ref 3.6–5.5)
PREALB SERPL-MCNC: 18 MG/DL (ref 18–38)
PROT SERPL-MCNC: 7 G/DL (ref 6–8.2)
PROTHROMBIN TIME: 13.2 SEC (ref 12–14.6)
RBC # BLD AUTO: 4.3 M/UL (ref 4.7–6.1)
SODIUM SERPL-SCNC: 139 MMOL/L (ref 135–145)
TRIGL SERPL-MCNC: 102 MG/DL (ref 0–149)
TSH SERPL DL<=0.005 MIU/L-ACNC: 2.48 UIU/ML (ref 0.38–5.33)
WBC # BLD AUTO: 6.6 K/UL (ref 4.8–10.8)

## 2018-12-19 PROCEDURE — 85610 PROTHROMBIN TIME: CPT

## 2018-12-19 PROCEDURE — 700102 HCHG RX REV CODE 250 W/ 637 OVERRIDE(OP): Performed by: INTERNAL MEDICINE

## 2018-12-19 PROCEDURE — 770021 HCHG ROOM/CARE - ISO PRIVATE

## 2018-12-19 PROCEDURE — 700102 HCHG RX REV CODE 250 W/ 637 OVERRIDE(OP): Performed by: EMERGENCY MEDICINE

## 2018-12-19 PROCEDURE — 83036 HEMOGLOBIN GLYCOSYLATED A1C: CPT

## 2018-12-19 PROCEDURE — 80053 COMPREHEN METABOLIC PANEL: CPT

## 2018-12-19 PROCEDURE — 96365 THER/PROPH/DIAG IV INF INIT: CPT

## 2018-12-19 PROCEDURE — 700111 HCHG RX REV CODE 636 W/ 250 OVERRIDE (IP): Performed by: INTERNAL MEDICINE

## 2018-12-19 PROCEDURE — A9270 NON-COVERED ITEM OR SERVICE: HCPCS | Performed by: EMERGENCY MEDICINE

## 2018-12-19 PROCEDURE — 85025 COMPLETE CBC W/AUTO DIFF WBC: CPT

## 2018-12-19 PROCEDURE — 700105 HCHG RX REV CODE 258: Performed by: INTERNAL MEDICINE

## 2018-12-19 PROCEDURE — 99223 1ST HOSP IP/OBS HIGH 75: CPT | Performed by: INTERNAL MEDICINE

## 2018-12-19 PROCEDURE — 83690 ASSAY OF LIPASE: CPT

## 2018-12-19 PROCEDURE — 83605 ASSAY OF LACTIC ACID: CPT

## 2018-12-19 PROCEDURE — A9270 NON-COVERED ITEM OR SERVICE: HCPCS | Performed by: INTERNAL MEDICINE

## 2018-12-19 PROCEDURE — 96368 THER/DIAG CONCURRENT INF: CPT

## 2018-12-19 PROCEDURE — 80061 LIPID PANEL: CPT

## 2018-12-19 PROCEDURE — 96366 THER/PROPH/DIAG IV INF ADDON: CPT

## 2018-12-19 PROCEDURE — 86140 C-REACTIVE PROTEIN: CPT

## 2018-12-19 PROCEDURE — 700111 HCHG RX REV CODE 636 W/ 250 OVERRIDE (IP): Performed by: EMERGENCY MEDICINE

## 2018-12-19 PROCEDURE — 700105 HCHG RX REV CODE 258: Performed by: EMERGENCY MEDICINE

## 2018-12-19 PROCEDURE — 73630 X-RAY EXAM OF FOOT: CPT | Mod: LT

## 2018-12-19 PROCEDURE — 84443 ASSAY THYROID STIM HORMONE: CPT

## 2018-12-19 PROCEDURE — 73590 X-RAY EXAM OF LOWER LEG: CPT | Mod: LT

## 2018-12-19 PROCEDURE — 85652 RBC SED RATE AUTOMATED: CPT

## 2018-12-19 PROCEDURE — 84134 ASSAY OF PREALBUMIN: CPT

## 2018-12-19 PROCEDURE — 87040 BLOOD CULTURE FOR BACTERIA: CPT

## 2018-12-19 RX ORDER — ONDANSETRON 4 MG/1
4 TABLET, ORALLY DISINTEGRATING ORAL EVERY 4 HOURS PRN
Status: DISCONTINUED | OUTPATIENT
Start: 2018-12-19 | End: 2018-12-28 | Stop reason: HOSPADM

## 2018-12-19 RX ORDER — ONDANSETRON 2 MG/ML
4 INJECTION INTRAMUSCULAR; INTRAVENOUS EVERY 4 HOURS PRN
Status: DISCONTINUED | OUTPATIENT
Start: 2018-12-19 | End: 2018-12-28 | Stop reason: HOSPADM

## 2018-12-19 RX ORDER — SODIUM CHLORIDE 9 MG/ML
INJECTION, SOLUTION INTRAVENOUS CONTINUOUS
Status: DISCONTINUED | OUTPATIENT
Start: 2018-12-19 | End: 2018-12-28 | Stop reason: HOSPADM

## 2018-12-19 RX ORDER — AMOXICILLIN 250 MG
2 CAPSULE ORAL 2 TIMES DAILY
Status: DISCONTINUED | OUTPATIENT
Start: 2018-12-19 | End: 2018-12-24

## 2018-12-19 RX ORDER — OXYCODONE HYDROCHLORIDE AND ACETAMINOPHEN 5; 325 MG/1; MG/1
2 TABLET ORAL ONCE
Status: COMPLETED | OUTPATIENT
Start: 2018-12-19 | End: 2018-12-19

## 2018-12-19 RX ORDER — AMLODIPINE BESYLATE 5 MG/1
5 TABLET ORAL
Status: DISCONTINUED | OUTPATIENT
Start: 2018-12-19 | End: 2018-12-25

## 2018-12-19 RX ORDER — POLYETHYLENE GLYCOL 3350 17 G/17G
1 POWDER, FOR SOLUTION ORAL
Status: DISCONTINUED | OUTPATIENT
Start: 2018-12-19 | End: 2018-12-24

## 2018-12-19 RX ORDER — PROMETHAZINE HYDROCHLORIDE 25 MG/1
12.5-25 TABLET ORAL EVERY 4 HOURS PRN
Status: DISCONTINUED | OUTPATIENT
Start: 2018-12-19 | End: 2018-12-28 | Stop reason: HOSPADM

## 2018-12-19 RX ORDER — ACETAMINOPHEN 325 MG/1
650 TABLET ORAL EVERY 6 HOURS PRN
Status: DISCONTINUED | OUTPATIENT
Start: 2018-12-19 | End: 2018-12-28 | Stop reason: HOSPADM

## 2018-12-19 RX ORDER — LINEZOLID 600 MG/1
600 TABLET, FILM COATED ORAL EVERY 12 HOURS
Status: COMPLETED | OUTPATIENT
Start: 2018-12-19 | End: 2018-12-26

## 2018-12-19 RX ORDER — PROMETHAZINE HYDROCHLORIDE 12.5 MG/1
12.5-25 SUPPOSITORY RECTAL EVERY 4 HOURS PRN
Status: DISCONTINUED | OUTPATIENT
Start: 2018-12-19 | End: 2018-12-28 | Stop reason: HOSPADM

## 2018-12-19 RX ORDER — PROPRANOLOL HYDROCHLORIDE 10 MG/1
10 TABLET ORAL TWICE DAILY
Status: DISCONTINUED | OUTPATIENT
Start: 2018-12-19 | End: 2018-12-28 | Stop reason: HOSPADM

## 2018-12-19 RX ORDER — NICOTINE 21 MG/24HR
14 PATCH, TRANSDERMAL 24 HOURS TRANSDERMAL
Status: DISCONTINUED | OUTPATIENT
Start: 2018-12-19 | End: 2018-12-28 | Stop reason: HOSPADM

## 2018-12-19 RX ORDER — BISACODYL 10 MG
10 SUPPOSITORY, RECTAL RECTAL
Status: DISCONTINUED | OUTPATIENT
Start: 2018-12-19 | End: 2018-12-24

## 2018-12-19 RX ADMIN — ACETAMINOPHEN 650 MG: 325 TABLET, FILM COATED ORAL at 20:29

## 2018-12-19 RX ADMIN — PIPERACILLIN AND TAZOBACTAM 3.38 G: 3; .375 INJECTION, POWDER, LYOPHILIZED, FOR SOLUTION INTRAVENOUS; PARENTERAL at 09:05

## 2018-12-19 RX ADMIN — OXYCODONE AND ACETAMINOPHEN 2 TABLET: 5; 325 TABLET ORAL at 00:54

## 2018-12-19 RX ADMIN — AMLODIPINE BESYLATE 5 MG: 5 TABLET ORAL at 17:51

## 2018-12-19 RX ADMIN — LINEZOLID 600 MG: 600 TABLET, FILM COATED ORAL at 17:51

## 2018-12-19 RX ADMIN — STANDARDIZED SENNA CONCENTRATE AND DOCUSATE SODIUM 2 TABLET: 8.6; 5 TABLET, FILM COATED ORAL at 17:50

## 2018-12-19 RX ADMIN — PIPERACILLIN AND TAZOBACTAM 3.38 G: 3; .375 INJECTION, POWDER, LYOPHILIZED, FOR SOLUTION INTRAVENOUS; PARENTERAL at 14:55

## 2018-12-19 RX ADMIN — PIPERACILLIN AND TAZOBACTAM 4.5 G: 4; .5 INJECTION, POWDER, LYOPHILIZED, FOR SOLUTION INTRAVENOUS; PARENTERAL at 00:43

## 2018-12-19 RX ADMIN — VANCOMYCIN HYDROCHLORIDE 2000 MG: 100 INJECTION, POWDER, LYOPHILIZED, FOR SOLUTION INTRAVENOUS at 00:30

## 2018-12-19 RX ADMIN — VANCOMYCIN HYDROCHLORIDE 1300 MG: 100 INJECTION, POWDER, LYOPHILIZED, FOR SOLUTION INTRAVENOUS at 13:03

## 2018-12-19 RX ADMIN — PIPERACILLIN AND TAZOBACTAM 3.38 G: 3; .375 INJECTION, POWDER, LYOPHILIZED, FOR SOLUTION INTRAVENOUS; PARENTERAL at 22:14

## 2018-12-19 RX ADMIN — SODIUM CHLORIDE: 9 INJECTION, SOLUTION INTRAVENOUS at 04:58

## 2018-12-19 RX ADMIN — NICOTINE 14 MG: 14 PATCH, EXTENDED RELEASE TRANSDERMAL at 09:05

## 2018-12-19 RX ADMIN — PROPRANOLOL HYDROCHLORIDE 10 MG: 10 TABLET ORAL at 20:26

## 2018-12-19 RX ADMIN — SODIUM CHLORIDE: 9 INJECTION, SOLUTION INTRAVENOUS at 10:01

## 2018-12-19 RX ADMIN — ACETAMINOPHEN 650 MG: 325 TABLET, FILM COATED ORAL at 09:57

## 2018-12-19 ASSESSMENT — PAIN SCALES - GENERAL
PAINLEVEL_OUTOF10: 3
PAINLEVEL_OUTOF10: 5
PAINLEVEL_OUTOF10: 3

## 2018-12-19 ASSESSMENT — PATIENT HEALTH QUESTIONNAIRE - PHQ9
SUM OF ALL RESPONSES TO PHQ9 QUESTIONS 1 AND 2: 0
1. LITTLE INTEREST OR PLEASURE IN DOING THINGS: NOT AT ALL
2. FEELING DOWN, DEPRESSED, IRRITABLE, OR HOPELESS: NOT AT ALL

## 2018-12-19 ASSESSMENT — COGNITIVE AND FUNCTIONAL STATUS - GENERAL
MOBILITY SCORE: 24
SUGGESTED CMS G CODE MODIFIER DAILY ACTIVITY: CH
DAILY ACTIVITIY SCORE: 24
SUGGESTED CMS G CODE MODIFIER MOBILITY: CH

## 2018-12-19 ASSESSMENT — LIFESTYLE VARIABLES
EVER HAD A DRINK FIRST THING IN THE MORNING TO STEADY YOUR NERVES TO GET RID OF A HANGOVER: NO
EVER FELT BAD OR GUILTY ABOUT YOUR DRINKING: NO
HAVE YOU EVER FELT YOU SHOULD CUT DOWN ON YOUR DRINKING: YES
ON A TYPICAL DAY WHEN YOU DRINK ALCOHOL HOW MANY DRINKS DO YOU HAVE: 6
ALCOHOL_USE: YES
HAVE PEOPLE ANNOYED YOU BY CRITICIZING YOUR DRINKING: NO
EVER_SMOKED: YES
TOTAL SCORE: 1
AVERAGE NUMBER OF DAYS PER WEEK YOU HAVE A DRINK CONTAINING ALCOHOL: 1
TOTAL SCORE: 1
HOW MANY TIMES IN THE PAST YEAR HAVE YOU HAD 5 OR MORE DRINKS IN A DAY: 30
CONSUMPTION TOTAL: POSITIVE
TOTAL SCORE: 1

## 2018-12-19 ASSESSMENT — ENCOUNTER SYMPTOMS
VOMITING: 0
FLANK PAIN: 0
PALPITATIONS: 0
MYALGIAS: 0
FEVER: 0
SHORTNESS OF BREATH: 0
BRUISES/BLEEDS EASILY: 0
CHILLS: 0
DIARRHEA: 0
HEADACHES: 0
DIZZINESS: 0
SPUTUM PRODUCTION: 0
ABDOMINAL PAIN: 0
NECK PAIN: 0
COUGH: 0
WHEEZING: 0
NAUSEA: 0
BACK PAIN: 0
FOCAL WEAKNESS: 0
BLURRED VISION: 0
SEIZURES: 0
BLOOD IN STOOL: 0
DIAPHORESIS: 0
SORE THROAT: 0

## 2018-12-19 ASSESSMENT — COPD QUESTIONNAIRES
HAVE YOU SMOKED AT LEAST 100 CIGARETTES IN YOUR ENTIRE LIFE: YES
COPD SCREENING SCORE: 4
DURING THE PAST 4 WEEKS HOW MUCH DID YOU FEEL SHORT OF BREATH: NONE/LITTLE OF THE TIME
DO YOU EVER COUGH UP ANY MUCUS OR PHLEGM?: NO/ONLY WITH OCCASIONAL COLDS OR INFECTIONS

## 2018-12-19 NOTE — PROGRESS NOTES
/88   Pulse (!) 53   Temp 36.8 °C (98.2 °F) (Temporal)   Resp 18   Ht 1.829 m (6')   Wt 77 kg (169 lb 12.1 oz)   SpO2 99%   BMI 23.02 kg/m²     Pt is AOX4. Denies SOB, chest pain, n/v. Pain is 5/10- repositioning offered. Declined Tylenol at this time.  2 RN skin check completed. Posterior lower left leg wound. Photos taken and uploaded to EMR. PIV- NS @ 75 mL/hr. Currently NPO. POC discussed. Bed is locked in lowest position.

## 2018-12-19 NOTE — PROGRESS NOTES
Accepted patient.    Continue current IV abx's, wound care LPS.  Arterial US ordered, but just had them done in 10/2018 and were normal. Doubt PVD is contributing to presentation. Appears venous in nature.

## 2018-12-19 NOTE — ED PROVIDER NOTES
"ED PROVIDER NOTE     Scribed for Sridhar Williamson M.D. by Reyes Cazares. 12/18/2018, 11:50 PM.    CHIEF COMPLAINT  Chief Complaint   Patient presents with   • Sent by MD     culture came back positive for \"MRSA, klebsiella, PSAR, E. Faecalis,\" sent to be admittied for IV abx and wound debridement   • Wound Infection     ongoing chronic wound for 2 years on left leg, has wound care dressing in place       HPI    Primary care provider: Marcela Ch M.D.  Means of arrival: Walk-in  History obtained from: Patient and daughter  History limited by: None    Goran Chavez is a 63 y.o. male who presents with a chronic left lower leg wound. His wound has been infected over the last 2 years and he says that despite aggressive wound care it is worsening.  Patient had cultures done on the wound recently and he was advised to present to the ED as it was positive for MRSA, klebsiella, pseudomonas, and E. Faecalis. He reports drainage coming from the wound and is experiencing mild left lower leg pain. He is not experiencing fever, vomiting, chest pain, sore throat, rhinorrhea, numbness, weakness, or any other associated symptoms.  No aggravating factors.  Not currently on antibiotics.    Patient denies any history of diabetes and confirms a history of hypertension.     REVIEW OF SYSTEMS  Constitutional: Negative for fever  HENT: Negative for sore throat, rhinorrhea.     Cardiovascular: Negative for chest pain  Gastrointestinal: Negative for vomiting.   Musculoskeletal: Positive for left lower leg pain.   Skin: Positive for left lower leg wound.   All other systems reviewed and are negative.    PAST MEDICAL HISTORY   has a past medical history of Anxiety; Charcot's joint of left foot, non-diabetic (3/21/2016); Chronic congestive heart failure (HCC) (11/16/2017); Hepatitis C, chronic (HCC); Hypertension; Migraine; Polysubstance abuse (HCC) (3/8/2018); Tobacco use (4/18/2016); Ulcer of left lower extremity with " necrosis of muscle (HCC) (3/21/2016); and Venous stasis ulcer (HCC) (2017).    PAST FAMILY HISTORY  Family History   Problem Relation Age of Onset   • Lung Disease Mother 70         from COPD   • Hypertension Mother    • Other Father 82         from brain aneurysm after fall   • Cancer Neg Hx    • Heart Disease Neg Hx    • Stroke Neg Hx    • Diabetes Neg Hx        SOCIAL HISTORY  Social History     Social History Main Topics   • Smoking status: Former Smoker     Packs/day: 0.00     Years: 48.00     Types: Cigarettes     Quit date: 2018   • Smokeless tobacco: Never Used      Comment: states he is using 7mg nicotine patch   • Alcohol use No      Comment: history of alcoholism    • Drug use: Yes     Types: Marijuana, Inhaled      Comment: MJ every day       SURGICAL HISTORY   has a past surgical history that includes drain skin abscess simple (Left); tibia orif (Right, ); shoulder orif (Left, ); and hand surgery (Left).    CURRENT MEDICATIONS  Home Medications     Reviewed by Jaimee Marquez R.N. (Registered Nurse) on 18 at 1311  Med List Status: Complete   Medication Last Dose Status   amLODIPine (NORVASC) 5 MG Tab 2018 Active   aspirin EC (ECOTRIN) 81 MG Tablet Delayed Response 2018 Active   atorvastatin (LIPITOR) 40 MG Tab 2018 Active   citalopram (CELEXA) 20 MG Tab 2018 Active   ferrous sulfate 325 (65 Fe) MG tablet 2018 Active   nicotine (NICODERM) 7 MG/24HR PATCH 24 HR 2018 Active   propranolol (INDERAL) 10 MG Tab 2018 Active                ALLERGIES  Allergies   Allergen Reactions   • Norco [Hydrocodone-Acetaminophen] Itching       PHYSICAL EXAM  VITAL SIGNS: /84   Pulse 66   Temp 36.6 °C (97.9 °F) (Temporal)   Resp 19   Ht 1.829 m (6')   Wt 79.7 kg (175 lb 11.3 oz)   SpO2 96%   BMI 23.83 kg/m²    Pulse ox interpretation: On room air, I interpret this pulse ox as normal.  Constitutional: Chronically ill appearing, Sitting  up.  HEENT: Normocephalic, atraumatic. Posterior pharynx clear, mucous membranes dry.  Eyes:  EOMI. Normal sclera.  Neck: Supple, Full range of motion, nontender.  Chest/Pulmonary: Clear to ausculation bilaterally, no wheezes or rhonchi.  Cardiovascular: Regular rate and rhythm, no murmur.  Diminished pulses in both feet but palpable.  Abdomen: Soft, nontender, no rebound, guarding, or masses.  Back: No CVA tenderness, nontender midline, no step offs.  Musculoskeletal: No edema. Left leg has chronic wound, mildly tender to palpation  Neuro: Clear speech, normal coordination, cranial nerves II-XII grossly intact.  Psych: Normal mood and affect.  Skin: No rashes, warm. Multiple seeping wounds in left lower extremity with trace erythema      DIAGNOSTIC STUDIES / PROCEDURES    LABS & EKG  Results for orders placed or performed during the hospital encounter of 12/18/18   CBC WITH DIFFERENTIAL   Result Value Ref Range    WBC 6.6 4.8 - 10.8 K/uL    RBC 4.30 (L) 4.70 - 6.10 M/uL    Hemoglobin 13.2 (L) 14.0 - 18.0 g/dL    Hematocrit 40.9 (L) 42.0 - 52.0 %    MCV 95.1 81.4 - 97.8 fL    MCH 30.7 27.0 - 33.0 pg    MCHC 32.3 (L) 33.7 - 35.3 g/dL    RDW 57.8 (H) 35.9 - 50.0 fL    Platelet Count 284 164 - 446 K/uL    MPV 9.0 9.0 - 12.9 fL    Neutrophils-Polys 63.60 44.00 - 72.00 %    Lymphocytes 22.80 22.00 - 41.00 %    Monocytes 9.40 0.00 - 13.40 %    Eosinophils 3.20 0.00 - 6.90 %    Basophils 0.80 0.00 - 1.80 %    Immature Granulocytes 0.20 0.00 - 0.90 %    Nucleated RBC 0.00 /100 WBC    Neutrophils (Absolute) 4.22 1.82 - 7.42 K/uL    Lymphs (Absolute) 1.51 1.00 - 4.80 K/uL    Monos (Absolute) 0.62 0.00 - 0.85 K/uL    Eos (Absolute) 0.21 0.00 - 0.51 K/uL    Baso (Absolute) 0.05 0.00 - 0.12 K/uL    Immature Granulocytes (abs) 0.01 0.00 - 0.11 K/uL    NRBC (Absolute) 0.00 K/uL   CMP   Result Value Ref Range    Sodium 139 135 - 145 mmol/L    Potassium 4.9 3.6 - 5.5 mmol/L    Chloride 105 96 - 112 mmol/L    Co2 27 20 - 33 mmol/L     Anion Gap 7.0 0.0 - 11.9    Glucose 103 (H) 65 - 99 mg/dL    Bun 46 (H) 8 - 22 mg/dL    Creatinine 1.30 0.50 - 1.40 mg/dL    Calcium 8.8 8.5 - 10.5 mg/dL    AST(SGOT) 35 12 - 45 U/L    ALT(SGPT) 37 2 - 50 U/L    Alkaline Phosphatase 71 30 - 99 U/L    Total Bilirubin 0.3 0.1 - 1.5 mg/dL    Albumin 3.7 3.2 - 4.9 g/dL    Total Protein 7.0 6.0 - 8.2 g/dL    Globulin 3.3 1.9 - 3.5 g/dL    A-G Ratio 1.1 g/dL   LIPASE   Result Value Ref Range    Lipase 25 11 - 82 U/L   PT/INR   Result Value Ref Range    PT 13.2 12.0 - 14.6 sec    INR 0.99 0.87 - 1.13   LACTIC ACID   Result Value Ref Range    Lactic Acid 1.5 0.5 - 2.0 mmol/L   CRP QUANTITIVE (NON-CARDIAC)   Result Value Ref Range    Stat C-Reactive Protein 0.24 0.00 - 0.75 mg/dL   ESTIMATED GFR   Result Value Ref Range    GFR If African American >60 >60 mL/min/1.73 m 2    GFR If Non  56 (A) >60 mL/min/1.73 m 2   Westergren Sed Rate   Result Value Ref Range    Sed Rate Westergren 16 0 - 20 mm/hour   CRP Quantitive   Result Value Ref Range    Stat C-Reactive Protein 0.23 0.00 - 0.75 mg/dL   Prealbumin   Result Value Ref Range    Pre-Albumin 18.0 18.0 - 38.0 mg/dL   TSH (Thyroid Stimulating Hormone)   Result Value Ref Range    TSH 2.480 0.380 - 5.330 uIU/mL   Lipid Profile (Lipid Panel) Fasting   Result Value Ref Range    Cholesterol,Tot 106 100 - 199 mg/dL    Triglycerides 102 0 - 149 mg/dL    HDL 43 >=40 mg/dL    LDL 43 <100 mg/dL        RADIOLOGY  DX-FOOT-COMPLETE 3+ LEFT   Final Result         1.  No acute traumatic bony injury.   2.  Severe pes planus deformity with remodeling of the midfoot bony structures. Appearance suggests Charcot joints.      DX-TIBIA AND FIBULA LEFT   Final Result         1.  No acute traumatic bony injury.   2.  Severe pes planus deformity with extensive degenerative changes and bony remodeling of the midfoot.          COURSE & MEDICAL DECISION MAKING    This is a 63 y.o. male who presents with worsening chronic wounds instructed  to come in for positive cultures.    Differential Diagnosis includes but is not limited to:  Sepsis, chronic wound, PVD, gangrene    ED Course:  12:01 AM - Patient seen and examined at bedside. Discussed plan of care, including reviewing lab results and imaging his foot. Patient agrees to the plan of care. Ordered for DX left tibia, DX left foot, CBC, CMP, lipase, PT/INR, lactate, blood culture, and CRP to evaluate his symptoms.  He will be treated with zosyn 4.5 g IV and vancomycin 2 g IV after consulting with pharmacist.    Thankfully, the patient's labs and workup thus far reassuring.  His white count is normal and his lactic acid level is normal.  He has no gas on x-ray.    12:24 AM Consulted with Dr. Bender, hospitalist, regarding the patient's case. He agrees to admit for further workup and treatment.  The patient would benefit from wound care, as well as infectious disease consult.  He may also need a vascular consult and he will have a consultative ultrasound performed while in the hospital.    Medications   senna-docusate (PERICOLACE or SENOKOT S) 8.6-50 MG per tablet 2 Tab (0 Tabs Oral Held 12/19/18 0600)     And   polyethylene glycol/lytes (MIRALAX) PACKET 1 Packet (not administered)     And   magnesium hydroxide (MILK OF MAGNESIA) suspension 30 mL (not administered)     And   bisacodyl (DULCOLAX) suppository 10 mg (not administered)   Respiratory Care per Protocol (not administered)   NS infusion ( Intravenous New Bag 12/19/18 1001)   acetaminophen (TYLENOL) tablet 650 mg (650 mg Oral Given 12/19/18 0957)   piperacillin-tazobactam (ZOSYN) 3.375 g in  mL IVPB (0 g Intravenous Stopped 12/19/18 1305)   ondansetron (ZOFRAN) syringe/vial injection 4 mg (not administered)   ondansetron (ZOFRAN ODT) dispertab 4 mg (not administered)   promethazine (PHENERGAN) tablet 12.5-25 mg (not administered)   promethazine (PHENERGAN) suppository 12.5-25 mg (not administered)   prochlorperazine (COMPAZINE) injection  5-10 mg (not administered)   nicotine (NICODERM) 14 MG/24HR 14 mg (14 mg Transdermal Patch Applied 12/19/18 0905)     And   nicotine polacrilex (NICORETTE) 2 MG piece 2 mg (not administered)   linezolid (ZYVOX) tablet 600 mg (not administered)   piperacillin-tazobactam (ZOSYN) 4.5 g in  mL IVPB (0 g Intravenous Stopped 12/19/18 0132)   vancomycin 2,000 mg in  mL IVPB (0 mg Intravenous Stopped 12/19/18 0457)   oxyCODONE-acetaminophen (PERCOCET) 5-325 MG per tablet 2 Tab (2 Tabs Oral Given 12/19/18 0054)     FINAL IMPRESSION  1. Chronic wound infection of abdomen, initial encounter    2. PVD (peripheral vascular disease) (HCC)    3. Chronic venous stasis    4. Macrocytic anemia    5. Polymicrobial bacterial infection          -ADMIT-     Pertinent Labs & Imaging studies reviewed and verified by myself, as well as nursing notes and the patient's past medical, family, and social histories (See chart for details).    Results, exam findings, clinical impression, presumed diagnosis, treatment options, and plan for admission were discussed with the patient and family, and they verbalized understanding, agreed with, and appreciated the plan of care.    Reyes LARSON (Jorgito), am scribing for, and in the presence of, Sridhar Williamson M.D..    Electronically signed by: Reyes Cazares (Jorgito), 12/18/2018    Sridhar LARSON M.D. personally performed the services described in this documentation, as scribed by Reyes Cazares in my presence, and it is both accurate and complete. C.    Portions of this record were made with voice recognition software.  Despite my review, spelling/grammar/context errors may still remain.  Interpretation of this chart should be taken in this context.    The note accurately reflects work and decisions made by me.  Sridhar Williamson  12/19/2018  2:07 PM

## 2018-12-19 NOTE — ED TRIAGE NOTES
"Goran Chavez  63 y.o.  Chief Complaint   Patient presents with   • Sent by MD     culture came back positive for \"MRSA, klebsiella, PSAR, E. Faecalis,\" sent to be admittied for IV abx and wound debridement   • Wound Infection     ongoing chronic wound for 2 years on left leg, has wound care dressing in place     Patient ambulatory with steady gait with daughter to triage room with above complaint.  Patient appears in mild discomfort.  Note from phone conversation with MD states:  \"Pt to go to ED to be admitted for IV abx and surgical debridement of LLE ulcers. Consult ID.\"    Wound is covered at this time, unable to assess.  Patient escorted to the lobby and instructed to notify staff of any changes in condition.      "

## 2018-12-19 NOTE — ED NOTES
Pt transported to R306 by transport and informed that pt is contact isolation/precaution. Transported to floor w/stable condition. No acute changes noted. NAD.

## 2018-12-19 NOTE — PROGRESS NOTES
Pharmacy Kinetics 63 y.o. male on vancomycin day # 1 2018    Indication for Treatment: SSTI of LLE    Pertinent history per medical record: Admitted on 2018 for lower extremity wound. Patient has known history of chronic wound of LLE - recent cultures obtained at outpatient wound care. Based on cultures, patient was instructed to present to ED for treatment - antibiotic therapy initiated.    Other antibiotics: Piperacillin/tazobactam 3.375 g IV q8h    Allergies: Norco [hydrocodone-acetaminophen]     List concerns for renal function: Elevated BUN/SCr ratio    Pertinent cultures to date:     18 LLE wound      Pseudomonas aeruginosa, MRSA, Klebsiella oxytoca, Enterococcus faecalis    Recent Labs      18   0006   WBC  6.6   NEUTSPOLYS  63.60     Recent Labs      18   0006   BUN  46*   CREATININE  1.30   ALBUMIN  3.7     Intake/Output Summary (Last 24 hours) at 18 0255  Last data filed at 18 0132   Gross per 24 hour   Intake              100 ml   Output                0 ml   Net              100 ml      Blood pressure 111/84, pulse 72, temperature 36.6 °C (97.9 °F), temperature source Temporal, resp. rate 20, height 1.829 m (6'), weight 79.7 kg (175 lb 11.3 oz), SpO2 98 %. Temp (24hrs), Av.6 °C (97.9 °F), Min:36.6 °C (97.9 °F), Max:36.6 °C (97.9 °F)      A/P   1. Vancomycin dose change: Give 2000 mg IV loading dose followed by 1300 mg IV q12h   2. Next vancomycin level: Trough in ~2 days (not ordered)  3. Goal trough: 12-16 mcg/mL  4. Comments: Therapy with vancomycin initiated based on previously obtained wound cultures. Based on limited concerns for accumulation along with previous tolerance of scheduled dosing per chart review, will provide loading dose and start scheduled regimen thereafter as outlined above. Will plan to check trough level when patient closer to steady state in order to ensure appropriate clearance and drug levels. Pharmacy will continue to follow.      Miguel Mattson PharmD, BCPS

## 2018-12-19 NOTE — ED NOTES
Medicated for pain, first abx started at this time.  Patient resting quietly in bed without distress, denies any complaints or needs at this time.  Call bell within reach.  Waiting for admission

## 2018-12-19 NOTE — H&P
Hospital Medicine History & Physical Note    Date of Service  12/19/2018    Primary Care Physician  Marcela Ch M.D.    Consultants  None    Code Status  Full code    Chief Complaint  Left lower extremity wound    History of Presenting Illness  63 y.o. male with a past medical history of hepatitis C, hypertension, tobacco abuse, polysubstance abuse, chronic venous stasis ulcer who presented 12/18/2018 with nonhealing left lower extremity wound.  Patient states that he has had a chronic chronic left lower extremity wound for the past year and has been followed by outpatient wound care.  He was instructed to present to the ER after a recent wound culture grew Pseudomonas, MRSA, E faecalis and Klebsiella.  He has noticed some purulent drainage from his wound.  He denies any fevers or chills.  Patient recently quit smoking tobacco 3 weeks ago.  He denies any history of diabetes.  He is currently not on antibiotics.    Review of Systems  Review of Systems   Constitutional: Negative for chills, diaphoresis and fever.   HENT: Negative for hearing loss and sore throat.    Eyes: Negative for blurred vision.   Respiratory: Negative for cough, sputum production, shortness of breath and wheezing.    Cardiovascular: Negative for chest pain, palpitations and leg swelling.   Gastrointestinal: Negative for abdominal pain, blood in stool, diarrhea, nausea and vomiting.   Genitourinary: Negative for dysuria, flank pain and urgency.   Musculoskeletal: Negative for back pain, joint pain, myalgias and neck pain.   Skin: Negative for rash.        Left lower extremity wound and drainage   Neurological: Negative for dizziness, focal weakness, seizures and headaches.   Endo/Heme/Allergies: Does not bruise/bleed easily.   Psychiatric/Behavioral: Negative for suicidal ideas.   All other systems reviewed and are negative.      Past Medical History   has a past medical history of Anxiety; Charcot's joint of left foot, non-diabetic  (3/21/2016); Chronic congestive heart failure (HCC) (11/16/2017); Hepatitis C, chronic (HCC); Hypertension; Migraine; Polysubstance abuse (HCC) (3/8/2018); Tobacco use (4/18/2016); Ulcer of left lower extremity with necrosis of muscle (HCC) (3/21/2016); and Venous stasis ulcer ([facility]) (2017).    Surgical History   has a past surgical history that includes pr drain skin abscess simple (Left); tibia orif (Right, 1997); shoulder orif (Left, 1997); and hand surgery (Left).     Family History  family history includes Hypertension in his mother; Lung Disease (age of onset: 70) in his mother; Other (age of onset: 82) in his father.     Social History   reports that he quit smoking about 2 weeks ago. His smoking use included Cigarettes. He smoked 0.00 packs per day for 48.00 years. He has never used smokeless tobacco. He reports that he uses drugs, including Marijuana and Inhaled. He reports that he does not drink alcohol.    Allergies  Allergies   Allergen Reactions   • Norco [Hydrocodone-Acetaminophen] Itching       Medications  Prior to Admission Medications   Prescriptions Last Dose Informant Patient Reported? Taking?   amLODIPine (NORVASC) 5 MG Tab   No No   Sig: TAKE 1 TABLET BY MOUTH EVERY DAY   aspirin EC (ECOTRIN) 81 MG Tablet Delayed Response   No No   Sig: Take 1 Tab by mouth every day.   atorvastatin (LIPITOR) 40 MG Tab   No No   Sig: Take 1 Tab by mouth every bedtime.   citalopram (CELEXA) 20 MG Tab   No No   Sig: Take 1 Tab by mouth every day.   ferrous sulfate 325 (65 Fe) MG tablet   No No   Sig: Take 1 Tab by mouth every day.   gabapentin (NEURONTIN) 300 MG Cap   No No   Sig: Take 1 Cap by mouth 2 Times a Day.   Patient not taking: Reported on 11/21/2018   nicotine (NICODERM) 7 MG/24HR PATCH 24 HR   No No   Sig: Apply 1 Patch to skin as directed every 24 hours.   Patient not taking: Reported on 11/21/2018   propranolol (INDERAL) 10 MG Tab   No No   Sig: Take 1 Tab by mouth 2 times a day.       Facility-Administered Medications: None       Physical Exam  Temp:  [36.6 °C (97.9 °F)] 36.6 °C (97.9 °F)  Pulse:  [64-72] 72  Resp:  [16-20] 20  BP: (111-119)/(74-84) 111/84    Physical Exam   Constitutional: He is oriented to person, place, and time. He appears well-developed and well-nourished. No distress.   HENT:   Head: Normocephalic and atraumatic.   Mouth/Throat: Oropharynx is clear and moist.   Eyes: Pupils are equal, round, and reactive to light. Conjunctivae are normal. No scleral icterus.   Neck: Normal range of motion. Neck supple.   Cardiovascular: Normal rate, regular rhythm and normal heart sounds.    Pulmonary/Chest: Effort normal and breath sounds normal. No respiratory distress. He has no wheezes. He has no rales.   Abdominal: Soft. Bowel sounds are normal. He exhibits no distension. There is no tenderness. There is no rebound.   Musculoskeletal: He exhibits edema. He exhibits no tenderness.   Large ulcer with purulent drainage on posterior aspect of left distal calf without significant erythema   Lymphadenopathy:     He has no cervical adenopathy.   Neurological: He is alert and oriented to person, place, and time. No cranial nerve deficit. Coordination normal.   Skin: Skin is warm.   Brawny discoloration of bilateral lower extremity consistent with chronic venous stasis     Psychiatric: His affect is labile. He is slowed.   Nursing note and vitals reviewed.      Laboratory:  Recent Labs      12/19/18 0006   WBC  6.6   RBC  4.30*   HEMOGLOBIN  13.2*   HEMATOCRIT  40.9*   MCV  95.1   MCH  30.7   MCHC  32.3*   RDW  57.8*   PLATELETCT  284   MPV  9.0     Recent Labs      12/19/18 0006   SODIUM  139   POTASSIUM  4.9   CHLORIDE  105   CO2  27   GLUCOSE  103*   BUN  46*   CREATININE  1.30   CALCIUM  8.8     Recent Labs      12/19/18 0006   ALTSGPT  37   ASTSGOT  35   ALKPHOSPHAT  71   TBILIRUBIN  0.3   LIPASE  25   GLUCOSE  103*     Recent Labs      12/19/18 0006   INR  0.99             No  results for input(s): TROPONINI in the last 72 hours.    Urinalysis:    No results found     Imaging:  DX-FOOT-COMPLETE 3+ LEFT   Final Result         1.  No acute traumatic bony injury.   2.  Severe pes planus deformity with remodeling of the midfoot bony structures. Appearance suggests Charcot joints.      DX-TIBIA AND FIBULA LEFT   Final Result         1.  No acute traumatic bony injury.   2.  Severe pes planus deformity with extensive degenerative changes and bony remodeling of the midfoot.      US-EXTREMITY ARTERY LOWER BILAT W/BHUPINDER (COMBO)    (Results Pending)         Assessment/Plan:  I anticipate this patient will require at least two midnights for appropriate medical management, necessitating inpatient admission.    Infected ulcer of skin (HCC)- (present on admission)   Assessment & Plan    Infected left lower extremity venous stasis ulcer  Wound culture grows  Pseudomonas, MRSA, E faecalis and Klebsiella  I have started the patient on IV vancomycin and IV Zosyn, monitor for vancomycin toxicity and follow trough levels  Wound care has been consulted  Bilateral lower extremity arterial study ordered to evaluate for PVD     Hyperlipidemia- (present on admission)   Assessment & Plan    Continue atorvastatin 40     Anxiety- (present on admission)   Assessment & Plan    Continue Celexa     Stage 3 chronic kidney disease (HCC)- (present on admission)   Assessment & Plan    Maintaining hydration  Avoiding nephrotoxics: NSAIDs etc  Following BMP       HTN (hypertension)- (present on admission)   Assessment & Plan    Continue Norvasc and Inderal     Tobacco use- (present on admission)   Assessment & Plan    Nicotine replacement has been provided         VTE prophylaxis: SCD

## 2018-12-20 ENCOUNTER — APPOINTMENT (OUTPATIENT)
Dept: RADIOLOGY | Facility: MEDICAL CENTER | Age: 63
DRG: 593 | End: 2018-12-20
Attending: INTERNAL MEDICINE
Payer: MEDICAID

## 2018-12-20 LAB
ANION GAP SERPL CALC-SCNC: 9 MMOL/L (ref 0–11.9)
BASOPHILS # BLD AUTO: 0.7 % (ref 0–1.8)
BASOPHILS # BLD: 0.05 K/UL (ref 0–0.12)
BUN SERPL-MCNC: 29 MG/DL (ref 8–22)
CALCIUM SERPL-MCNC: 8.4 MG/DL (ref 8.5–10.5)
CHLORIDE SERPL-SCNC: 109 MMOL/L (ref 96–112)
CO2 SERPL-SCNC: 23 MMOL/L (ref 20–33)
CREAT SERPL-MCNC: 1.08 MG/DL (ref 0.5–1.4)
EKG IMPRESSION: NORMAL
EOSINOPHIL # BLD AUTO: 0.26 K/UL (ref 0–0.51)
EOSINOPHIL NFR BLD: 3.7 % (ref 0–6.9)
ERYTHROCYTE [DISTWIDTH] IN BLOOD BY AUTOMATED COUNT: 58.4 FL (ref 35.9–50)
GLUCOSE SERPL-MCNC: 199 MG/DL (ref 65–99)
HCT VFR BLD AUTO: 40.6 % (ref 42–52)
HGB BLD-MCNC: 12.9 G/DL (ref 14–18)
HIV 1+2 AB+HIV1 P24 AG SERPL QL IA: NON REACTIVE
IMM GRANULOCYTES # BLD AUTO: 0.02 K/UL (ref 0–0.11)
IMM GRANULOCYTES NFR BLD AUTO: 0.3 % (ref 0–0.9)
LYMPHOCYTES # BLD AUTO: 0.75 K/UL (ref 1–4.8)
LYMPHOCYTES NFR BLD: 10.7 % (ref 22–41)
MCH RBC QN AUTO: 30.9 PG (ref 27–33)
MCHC RBC AUTO-ENTMCNC: 31.8 G/DL (ref 33.7–35.3)
MCV RBC AUTO: 97.4 FL (ref 81.4–97.8)
MONOCYTES # BLD AUTO: 0.33 K/UL (ref 0–0.85)
MONOCYTES NFR BLD AUTO: 4.7 % (ref 0–13.4)
NEUTROPHILS # BLD AUTO: 5.62 K/UL (ref 1.82–7.42)
NEUTROPHILS NFR BLD: 79.9 % (ref 44–72)
NRBC # BLD AUTO: 0 K/UL
NRBC BLD-RTO: 0 /100 WBC
PLATELET # BLD AUTO: 252 K/UL (ref 164–446)
PMV BLD AUTO: 9.1 FL (ref 9–12.9)
POTASSIUM SERPL-SCNC: 4.4 MMOL/L (ref 3.6–5.5)
RBC # BLD AUTO: 4.17 M/UL (ref 4.7–6.1)
SODIUM SERPL-SCNC: 141 MMOL/L (ref 135–145)
WBC # BLD AUTO: 7 K/UL (ref 4.8–10.8)

## 2018-12-20 PROCEDURE — A9270 NON-COVERED ITEM OR SERVICE: HCPCS | Performed by: INTERNAL MEDICINE

## 2018-12-20 PROCEDURE — 700117 HCHG RX CONTRAST REV CODE 255: Performed by: INTERNAL MEDICINE

## 2018-12-20 PROCEDURE — 700111 HCHG RX REV CODE 636 W/ 250 OVERRIDE (IP): Performed by: INTERNAL MEDICINE

## 2018-12-20 PROCEDURE — 770021 HCHG ROOM/CARE - ISO PRIVATE

## 2018-12-20 PROCEDURE — 302009 SKINTEGRITY WOUND CLEANSER

## 2018-12-20 PROCEDURE — 700105 HCHG RX REV CODE 258: Performed by: INTERNAL MEDICINE

## 2018-12-20 PROCEDURE — 700102 HCHG RX REV CODE 250 W/ 637 OVERRIDE(OP): Performed by: INTERNAL MEDICINE

## 2018-12-20 PROCEDURE — 99253 IP/OBS CNSLTJ NEW/EST LOW 45: CPT | Performed by: INTERNAL MEDICINE

## 2018-12-20 PROCEDURE — 93010 ELECTROCARDIOGRAM REPORT: CPT | Performed by: INTERNAL MEDICINE

## 2018-12-20 PROCEDURE — 80048 BASIC METABOLIC PNL TOTAL CA: CPT

## 2018-12-20 PROCEDURE — 36415 COLL VENOUS BLD VENIPUNCTURE: CPT

## 2018-12-20 PROCEDURE — 71260 CT THORAX DX C+: CPT

## 2018-12-20 PROCEDURE — 93005 ELECTROCARDIOGRAM TRACING: CPT | Performed by: INTERNAL MEDICINE

## 2018-12-20 PROCEDURE — 87389 HIV-1 AG W/HIV-1&-2 AB AG IA: CPT

## 2018-12-20 PROCEDURE — 85025 COMPLETE CBC W/AUTO DIFF WBC: CPT

## 2018-12-20 PROCEDURE — 99233 SBSQ HOSP IP/OBS HIGH 50: CPT | Performed by: INTERNAL MEDICINE

## 2018-12-20 RX ORDER — HEPARIN SODIUM 5000 [USP'U]/ML
5000 INJECTION, SOLUTION INTRAVENOUS; SUBCUTANEOUS EVERY 8 HOURS
Status: DISCONTINUED | OUTPATIENT
Start: 2018-12-20 | End: 2018-12-28 | Stop reason: HOSPADM

## 2018-12-20 RX ADMIN — LINEZOLID 600 MG: 600 TABLET, FILM COATED ORAL at 16:57

## 2018-12-20 RX ADMIN — PROPRANOLOL HYDROCHLORIDE 10 MG: 10 TABLET ORAL at 05:37

## 2018-12-20 RX ADMIN — PIPERACILLIN AND TAZOBACTAM 4.5 G: 4; .5 INJECTION, POWDER, LYOPHILIZED, FOR SOLUTION INTRAVENOUS; PARENTERAL at 14:12

## 2018-12-20 RX ADMIN — AMLODIPINE BESYLATE 5 MG: 5 TABLET ORAL at 05:37

## 2018-12-20 RX ADMIN — STANDARDIZED SENNA CONCENTRATE AND DOCUSATE SODIUM 2 TABLET: 8.6; 5 TABLET, FILM COATED ORAL at 16:57

## 2018-12-20 RX ADMIN — LINEZOLID 600 MG: 600 TABLET, FILM COATED ORAL at 05:36

## 2018-12-20 RX ADMIN — IOHEXOL 100 ML: 350 INJECTION, SOLUTION INTRAVENOUS at 19:18

## 2018-12-20 RX ADMIN — PIPERACILLIN AND TAZOBACTAM 3.38 G: 3; .375 INJECTION, POWDER, LYOPHILIZED, FOR SOLUTION INTRAVENOUS; PARENTERAL at 05:41

## 2018-12-20 RX ADMIN — HEPARIN SODIUM 5000 UNITS: 5000 INJECTION, SOLUTION INTRAVENOUS; SUBCUTANEOUS at 21:40

## 2018-12-20 RX ADMIN — NICOTINE 14 MG: 14 PATCH, EXTENDED RELEASE TRANSDERMAL at 05:37

## 2018-12-20 RX ADMIN — PIPERACILLIN AND TAZOBACTAM 4.5 G: 4; .5 INJECTION, POWDER, LYOPHILIZED, FOR SOLUTION INTRAVENOUS; PARENTERAL at 21:39

## 2018-12-20 RX ADMIN — PROPRANOLOL HYDROCHLORIDE 10 MG: 10 TABLET ORAL at 16:58

## 2018-12-20 ASSESSMENT — ENCOUNTER SYMPTOMS
WEAKNESS: 0
DIZZINESS: 0
CLAUDICATION: 0
NAUSEA: 1
FEVER: 0
BLURRED VISION: 0
NERVOUS/ANXIOUS: 0
DIARRHEA: 0
COUGH: 0
SPUTUM PRODUCTION: 0
INSOMNIA: 0
SORE THROAT: 0
SPEECH CHANGE: 0
SENSORY CHANGE: 0
HEADACHES: 0
CONSTIPATION: 0
VOMITING: 0
PHOTOPHOBIA: 0
MYALGIAS: 0
SHORTNESS OF BREATH: 0
HEARTBURN: 0
DEPRESSION: 0
ABDOMINAL PAIN: 0
CHILLS: 0
NAUSEA: 0

## 2018-12-20 ASSESSMENT — PAIN SCALES - GENERAL
PAINLEVEL_OUTOF10: 0
PAINLEVEL_OUTOF10: 0

## 2018-12-20 NOTE — WOUND TEAM
"[facility]  Inpatient Services  Initial Wound and Skin Care Evaluation    Admission Date: 12/18/2018     Consult Date: 12/20/2018 @ 1100   HPI, PMH, SH: Reviewed    Unit where seen by Wound Team: [id number]     WOUND CONSULT RELATED TO:  Placement of NPWT to LLE posterior     SUBJECTIVE:  \"I only have rides to the wound clinic on Wednesdays\"      Self Report / Pain Level:  denies       OBJECTIVE:  Patient lying supine in bed, gauze dressing in place to leg    WOUND TYPE, LOCATION, CHARACTERISTICS (Pressure Injuries: location, stage, POA or date identified)     Negative Pressure Wound Therapy Leg Posterior;Left (Active)   NPWT Pump Mode / Pressure Setting Continuous;125 mmHg    Dressing Type Veraflo cleanse choice;Medium    Number of Foam Pieces Used 2    VAC VeraFlo Irrigant Normal Saline    VAC VeraFlo Soak Time (mins) 7    VAC VeraFlo Instill Volume (ml) 6    VAC VeraFlo - Therapy Time (hrs) 2.5    VAC VeraFlo Pressure (mm/Hg) 125 mmHg;Continuous      Wound Leg Left Posterior LE (Active)   Wound Image      Site Assessment Red;Yellow    Marylou-wound Assessment Yellow-brown;Induration    Margins Attached edges    Wound Length (cm) 5.3 cm    Wound Width (cm) 2.7 cm    Wound Depth (cm) 0.2 cm    Wound Surface Area (cm^2) 14.31 cm^2    Tunneling 0 cm    Undermining 0 cm    Closure Secondary intention    Drainage Amount Small    Drainage Description Serosanguineous    Non-staged Wound Description Full thickness    Treatments Cleansed    Cleansing Approved Wound Cleanser    Periwound Protectant Benzoin;Drape    Dressing Options Wound Vac    Dressing Cleansing/Solutions Normal Saline    Dressing Changed New    Dressing Status Clean;Dry;Intact    Dressing Change Frequency Tuesday, Thursday, Saturday    NEXT Dressing Change  12/22/18    NEXT Weekly Photo (Inpatient Only) 12/27/18    Odor None    Pulses Left;1+;DP;PT    Exposed Structures None    Tissue Type and Percentage 80% adherent yellow, 20% red      Lab " Values:    WBC:       WBC   Date/Time Value Ref Range Status   12/20/2018 12:14 AM 7.0 4.8 - 10.8 K/uL Final     AIC:      Lab Results   Component Value Date/Time    HBA1C 5.6 11/10/2017 11:43 AM         Culture:   Obtained 12/13/2018, Results:    Component   Significant Indicator  (Positive)   POS (POS)    Source   WND    Site   Left Lower Extremity    Culture Result Wound  (Abnormal)   Gram Stain Result   Rare WBCs.   Moderate Gram negative rods.   Few Gram positive cocci.     Culture Result Wound  (Abnormal)      Pseudomonas aeruginosa   Heavy growth   P.aeruginosa may develop resistance during prolonged therapy   with all antibiotics. Isolates that are initially susceptible   may become resistant within three to four days after   initiation of therapy. Testing of repeat isolates may be   warranted.     Culture Result Wound  (Abnormal)      Klebsiella oxytoca   Heavy growth     Culture Result Wound  (Abnormal)      Methicillin Resistant Staphylococcus aureus   Heavy growth     Culture Result Wound  (Abnormal)      Enterococcus faecalis   Heavy growth   Combination therapy with ampicillin, penicillin, or   vancomycin (for susceptible strains) plus an aminoglycoside   is usually indicated for serious enterococcal infections,   such as endocarditis unless high-level resistance to both   gentamicin and streptomycin is documented; such combinations   are predicted to result in synergistic killing of the   Enterococcus.   The susceptibility profile for this organism indicates that   Gentamycin would not be an effective component of combination   therapy.          INTERVENTIONS BY WOUND TEAM:  Dressing removed, leg cleansed with no rinse foam cleanser and moist wash cloth. Benzoin and drape to sandrine wound skin. Small piece of honeycomb foam onto wound bed, 1 piece of thin gray foam over top (2 pieces total). Trac pad applied, NPWT started at 125 mm Hg continuous, settings: 6 mL of saline for dwell time of 7 minutes,  every 2 1/2 minutes. No leaks noted    Dressing selection:  NPWT         Interdisciplinary consultation: Patient, Bedside RN (Viky). Spoke with case coordination regarding patient's concern with transportation to wound clinic for 3 x weekly dressing changes. Case coordination will follow up with plan of care upon discharge, unknown if NPWT will still be in place when patient is discharged.    EVALUATION: Patient with chronic venous stasis ulcer, been treated at wound clinic for approximately one year. Wound cultured last week, infected, see above. Per LPS provider Abeba ANGULO, wound team to place NPWT with veraflo cleanse choice today. NPWT to help promote granular tissue, reduce bioburden and manage exudate.    Factors affecting wound healing: hx of tobacco abuse, infection    Goals: Steady decrease in wound area and depth weekly.    NURSING PLAN OF CARE ORDERS (X):    Dressing changes: See Dressing Care orders: X  Skin care: See Skin Care orders:   Rectal tube care: See Rectal Tube Care orders:   Other orders:    RSKIN: CURRENT (X) ORDERED (O):   Q shift Vahe:  X  Q shift pressure point assessments:  X  Pressure redistribution mattress            LUBA          Bariatric LUBA         Bariatric foam           Heel float boots patient is independent with bed mobility         Float Heels off Bed with Pillows               Barrier wipes         Barrier Cream         Barrier paste          Sacral silicone dressing         Silicone O2 tubing         Anchorfast         Cannula fixation Device (Tender )          Gray Foam Ear protectors           Trach with Optifoam split foam                 Waffle cushion        Waffle Overlay         Rectal tube or BMS         Antifungal tx      Interdry          Reposition q 2 hours  See above      Up to chair        Ambulate      PT/OT        Dietician        Diabetes Education      PO  X   TF     TPN     NPO   # days   Other        WOUND TEAM PLAN OF CARE (X):   NPWT  change 3 x week: X       Dressing changes by wound team:       Follow up as needed:       Other (explain):     Anticipated discharge plans (X):   SNF:           Home Care:           Outpatient Wound Center: X           Self Care:            Other:  TBD, per patient he only has rides to the clinic on Wednesday's, may need Home Health if not able to arrange transportation.

## 2018-12-20 NOTE — CARE PLAN
Problem: Communication  Goal: The ability to communicate needs accurately and effectively will improve  Outcome: PROGRESSING AS EXPECTED  Pt calls appropriately for assistance. Call light is within reach.     Problem: Infection  Goal: Will remain free from infection  Outcome: PROGRESSING AS EXPECTED  Pt is on IV ABX for infection to wound on left leg.
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
## 2018-12-20 NOTE — PROGRESS NOTES
/95   Pulse 66   Temp 37.1 °C (98.7 °F) (Temporal)   Resp 16   Ht 1.829 m (6')   Wt 77 kg (169 lb 12.1 oz)   SpO2 94%   BMI 23.02 kg/m²     Pt is AOX4. Denies SOB, chest pain, n/v, pain. Received call from APRN this morning stating pt will not have surgery.  Instead plan is to do wound vac to left lower leg. PIV running NS @ 75 mL/hr.  POC discussed with pt. Bed is locked in lowest position.

## 2018-12-20 NOTE — PROGRESS NOTES
2 RN Skin check completed with KEN Rincon.  Lower Left leg has wound to posterior (6 inches x 2 inches).  Lower Left leg has wound to anterior (1.5 in x 1.5 in). Ears are red and blanching. Elbows red and blanching.  Bottom is in intact.

## 2018-12-20 NOTE — PROGRESS NOTES
Received report and assumed patient care at change of shift. Patient is resting in bed, A&Ox4. Patient had reports of headache, pt medicated per MAR.  Pt was NPO since 0200 12/19 for debridement, per Nurse practitioner ok to remove pt from npo status until 12/20 @0000. Pt on regular diet, dinner received from [facility]. Plan of care discussed, questions answered. Bed is in the lowest position and locked, call light within reach, non-skid socks in place, hourly rounding. Patient reports no further needs and this time.
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
## 2018-12-20 NOTE — CARE PLAN
Problem: Communication  Goal: The ability to communicate needs accurately and effectively will improve  Outcome: PROGRESSING SLOWER THAN EXPECTED  Educated pt to use call light to call for assistance. Call light is within reach.     Problem: Infection  Goal: Will remain free from infection  Outcome: PROGRESSING AS EXPECTED  Pt is on IVABX for wound infection. Wound vac to be placed.

## 2018-12-21 LAB
BASOPHILS # BLD AUTO: 0.7 % (ref 0–1.8)
BASOPHILS # BLD: 0.04 K/UL (ref 0–0.12)
EOSINOPHIL # BLD AUTO: 0.16 K/UL (ref 0–0.51)
EOSINOPHIL NFR BLD: 2.8 % (ref 0–6.9)
ERYTHROCYTE [DISTWIDTH] IN BLOOD BY AUTOMATED COUNT: 57.5 FL (ref 35.9–50)
HCT VFR BLD AUTO: 43.2 % (ref 42–52)
HGB BLD-MCNC: 14 G/DL (ref 14–18)
IMM GRANULOCYTES # BLD AUTO: 0.02 K/UL (ref 0–0.11)
IMM GRANULOCYTES NFR BLD AUTO: 0.4 % (ref 0–0.9)
LYMPHOCYTES # BLD AUTO: 0.98 K/UL (ref 1–4.8)
LYMPHOCYTES NFR BLD: 17.4 % (ref 22–41)
MCH RBC QN AUTO: 31.3 PG (ref 27–33)
MCHC RBC AUTO-ENTMCNC: 32.4 G/DL (ref 33.7–35.3)
MCV RBC AUTO: 96.4 FL (ref 81.4–97.8)
MONOCYTES # BLD AUTO: 0.32 K/UL (ref 0–0.85)
MONOCYTES NFR BLD AUTO: 5.7 % (ref 0–13.4)
NEUTROPHILS # BLD AUTO: 4.1 K/UL (ref 1.82–7.42)
NEUTROPHILS NFR BLD: 73 % (ref 44–72)
NRBC # BLD AUTO: 0 K/UL
NRBC BLD-RTO: 0 /100 WBC
PLATELET # BLD AUTO: 232 K/UL (ref 164–446)
PMV BLD AUTO: 9 FL (ref 9–12.9)
RBC # BLD AUTO: 4.48 M/UL (ref 4.7–6.1)
TEST NAME 95000: NORMAL
WBC # BLD AUTO: 5.6 K/UL (ref 4.8–10.8)

## 2018-12-21 PROCEDURE — 700102 HCHG RX REV CODE 250 W/ 637 OVERRIDE(OP): Performed by: INTERNAL MEDICINE

## 2018-12-21 PROCEDURE — 99232 SBSQ HOSP IP/OBS MODERATE 35: CPT | Performed by: INTERNAL MEDICINE

## 2018-12-21 PROCEDURE — A9270 NON-COVERED ITEM OR SERVICE: HCPCS | Performed by: INTERNAL MEDICINE

## 2018-12-21 PROCEDURE — 700111 HCHG RX REV CODE 636 W/ 250 OVERRIDE (IP): Performed by: INTERNAL MEDICINE

## 2018-12-21 PROCEDURE — 85025 COMPLETE CBC W/AUTO DIFF WBC: CPT

## 2018-12-21 PROCEDURE — 82105 ALPHA-FETOPROTEIN SERUM: CPT

## 2018-12-21 PROCEDURE — 700105 HCHG RX REV CODE 258: Performed by: INTERNAL MEDICINE

## 2018-12-21 PROCEDURE — 770021 HCHG ROOM/CARE - ISO PRIVATE

## 2018-12-21 PROCEDURE — 36415 COLL VENOUS BLD VENIPUNCTURE: CPT

## 2018-12-21 RX ORDER — OXYCODONE HYDROCHLORIDE 5 MG/1
5 TABLET ORAL EVERY 4 HOURS PRN
Status: DISCONTINUED | OUTPATIENT
Start: 2018-12-21 | End: 2018-12-28 | Stop reason: HOSPADM

## 2018-12-21 RX ORDER — OXYCODONE HYDROCHLORIDE 10 MG/1
10 TABLET ORAL EVERY 4 HOURS PRN
Status: DISCONTINUED | OUTPATIENT
Start: 2018-12-21 | End: 2018-12-28 | Stop reason: HOSPADM

## 2018-12-21 RX ADMIN — STANDARDIZED SENNA CONCENTRATE AND DOCUSATE SODIUM 2 TABLET: 8.6; 5 TABLET, FILM COATED ORAL at 18:28

## 2018-12-21 RX ADMIN — OXYCODONE HYDROCHLORIDE 10 MG: 10 TABLET ORAL at 22:21

## 2018-12-21 RX ADMIN — PIPERACILLIN AND TAZOBACTAM 4.5 G: 4; .5 INJECTION, POWDER, LYOPHILIZED, FOR SOLUTION INTRAVENOUS; PARENTERAL at 14:36

## 2018-12-21 RX ADMIN — HEPARIN SODIUM 5000 UNITS: 5000 INJECTION, SOLUTION INTRAVENOUS; SUBCUTANEOUS at 05:19

## 2018-12-21 RX ADMIN — PROPRANOLOL HYDROCHLORIDE 10 MG: 10 TABLET ORAL at 05:18

## 2018-12-21 RX ADMIN — ONDANSETRON 4 MG: 4 TABLET, ORALLY DISINTEGRATING ORAL at 09:54

## 2018-12-21 RX ADMIN — HEPARIN SODIUM 5000 UNITS: 5000 INJECTION, SOLUTION INTRAVENOUS; SUBCUTANEOUS at 14:35

## 2018-12-21 RX ADMIN — AMLODIPINE BESYLATE 5 MG: 5 TABLET ORAL at 05:19

## 2018-12-21 RX ADMIN — PIPERACILLIN AND TAZOBACTAM 4.5 G: 4; .5 INJECTION, POWDER, LYOPHILIZED, FOR SOLUTION INTRAVENOUS; PARENTERAL at 05:30

## 2018-12-21 RX ADMIN — SODIUM CHLORIDE: 9 INJECTION, SOLUTION INTRAVENOUS at 22:27

## 2018-12-21 RX ADMIN — HEPARIN SODIUM 5000 UNITS: 5000 INJECTION, SOLUTION INTRAVENOUS; SUBCUTANEOUS at 22:21

## 2018-12-21 RX ADMIN — LINEZOLID 600 MG: 600 TABLET, FILM COATED ORAL at 05:19

## 2018-12-21 RX ADMIN — OXYCODONE HYDROCHLORIDE 5 MG: 5 TABLET ORAL at 15:31

## 2018-12-21 RX ADMIN — NICOTINE 14 MG: 14 PATCH, EXTENDED RELEASE TRANSDERMAL at 05:19

## 2018-12-21 RX ADMIN — PROPRANOLOL HYDROCHLORIDE 10 MG: 10 TABLET ORAL at 18:28

## 2018-12-21 RX ADMIN — STANDARDIZED SENNA CONCENTRATE AND DOCUSATE SODIUM 2 TABLET: 8.6; 5 TABLET, FILM COATED ORAL at 05:18

## 2018-12-21 RX ADMIN — LINEZOLID 600 MG: 600 TABLET, FILM COATED ORAL at 18:28

## 2018-12-21 RX ADMIN — OXYCODONE HYDROCHLORIDE 5 MG: 5 TABLET ORAL at 18:28

## 2018-12-21 RX ADMIN — PIPERACILLIN AND TAZOBACTAM 4.5 G: 4; .5 INJECTION, POWDER, LYOPHILIZED, FOR SOLUTION INTRAVENOUS; PARENTERAL at 22:21

## 2018-12-21 ASSESSMENT — ENCOUNTER SYMPTOMS
NERVOUS/ANXIOUS: 0
SENSORY CHANGE: 0
FEVER: 0
DIARRHEA: 0
COUGH: 0
HEADACHES: 0
CONSTIPATION: 0
SORE THROAT: 0
CLAUDICATION: 0
DIZZINESS: 0
CHILLS: 0
HEARTBURN: 0
MYALGIAS: 0
SHORTNESS OF BREATH: 0
SPEECH CHANGE: 0
ABDOMINAL PAIN: 0
INSOMNIA: 0
BLURRED VISION: 0
WEAKNESS: 0
NAUSEA: 0
DEPRESSION: 0
VOMITING: 0
PHOTOPHOBIA: 0

## 2018-12-21 ASSESSMENT — PAIN SCALES - GENERAL
PAINLEVEL_OUTOF10: 3
PAINLEVEL_OUTOF10: 0
PAINLEVEL_OUTOF10: 6
PAINLEVEL_OUTOF10: 5
PAINLEVEL_OUTOF10: 5

## 2018-12-21 ASSESSMENT — PATIENT HEALTH QUESTIONNAIRE - PHQ9
2. FEELING DOWN, DEPRESSED, IRRITABLE, OR HOPELESS: NOT AT ALL
1. LITTLE INTEREST OR PLEASURE IN DOING THINGS: NOT AT ALL
SUM OF ALL RESPONSES TO PHQ9 QUESTIONS 1 AND 2: 0

## 2018-12-21 NOTE — DISCHARGE PLANNING
Anticipated Discharge Disposition: Home w/ wound vac    Action: LSW worked w/ Evelyn w/ [employer] on pt's wound vac paperwork. All necessary documentation was given to [employer], and wound vac Therapy Insurance Authorization Form was signed by MD. LSW faxed completed packet to [facility] at [phone]. Per Evelyn, pt's insurance authorization will take at least 5-7 business days and then delivery of wound vac could possible be sometime during the first week of January 2019.    Barriers to Discharge: Insurance authorization and delivery of wound vac.    Plan: LSW completed and faxed required paperwork to Evelyn and LSW awaiting insurance authorization.

## 2018-12-21 NOTE — PROGRESS NOTES
Received report and assumed patient care at change of shift. Patient is resting in bed, A&Ox4. Patient reports no pain or nausea. Plan of care discussed, questions answered. Bed is in the lowest position and locked, call light within reach, non-skid socks in place, hourly rounding. Patient reports no further needs and this time.

## 2018-12-21 NOTE — PROGRESS NOTES
[facility] Medicine Daily Progress Note    Date of Service  12/20/2018    Chief Complaint  63 y.o. male admitted 12/18/2018 with non-healing leg ulcer and found to have polymicrobial wound culture and told to present to ED.    Hospital Course    Patient admitted and started on Vanco and zosyn.  LPS consulted and reviewed with surgery, no debridement planned at this time.  Wound vac in place and ID consulted for recommendations.      Interval Problem Update  Patient states he feels no pain and is doing okay.  He is concerned that he missed the appointments for imaging ordered by his GI physician, Dr Park for evaluation for concern of hepatocellular carcinoma given his history of hepatitis C.  As patient is a high risk for cancer, MRI and CT ordered inpatient for further evaluation.    Consultants/Specialty  none    Code Status  full    Disposition  tbd    Review of Systems  Review of Systems   Constitutional: Negative for chills and fever.   HENT: Negative for congestion and sore throat.    Eyes: Negative for blurred vision and photophobia.   Respiratory: Negative for cough and shortness of breath.    Cardiovascular: Negative for chest pain, claudication and leg swelling.   Gastrointestinal: Negative for abdominal pain, constipation, diarrhea, heartburn, nausea and vomiting.   Genitourinary: Negative for dysuria and hematuria.   Musculoskeletal: Negative for joint pain and myalgias.   Skin: Negative for itching and rash.        Non-healing ulcer L calf   Neurological: Negative for dizziness, sensory change, speech change, weakness and headaches.   Psychiatric/Behavioral: Negative for depression. The patient is not nervous/anxious and does not have insomnia.         Physical Exam  Temp:  [36.9 °C (98.4 °F)-37.3 °C (99.1 °F)] 37.2 °C (98.9 °F)  Pulse:  [59-75] 59  Resp:  [16-18] 18  BP: (137-164)/(73-96) 161/96    Physical Exam   Constitutional: He is oriented to person, place, and time. He appears well-developed and  well-nourished. No distress.   HENT:   Head: Normocephalic and atraumatic.   Eyes: Conjunctivae are normal. No scleral icterus.   Neck: Neck supple. No JVD present.   Cardiovascular: Normal rate, regular rhythm and normal heart sounds.  Exam reveals no gallop and no friction rub.    No murmur heard.  Pulmonary/Chest: Effort normal and breath sounds normal. No respiratory distress. He has no wheezes. He exhibits no tenderness.   Abdominal: Soft. Bowel sounds are normal. He exhibits no distension and no mass. There is no tenderness.   Musculoskeletal: He exhibits no edema or tenderness.   Lymphadenopathy:     He has no cervical adenopathy.   Neurological: He is alert and oriented to person, place, and time. No cranial nerve deficit.   Skin: Skin is warm and dry. He is not diaphoretic. No erythema. No pallor.   Wound vac in place LLE over ulcer     Psychiatric: He has a normal mood and affect. His behavior is normal.   Nursing note and vitals reviewed.      Fluids    Intake/Output Summary (Last 24 hours) at 12/20/18 1802  Last data filed at 12/20/18 1400   Gross per 24 hour   Intake              540 ml   Output                0 ml   Net              540 ml       Laboratory  Recent Labs      12/19/18   0006  12/20/18   0014   WBC  6.6  7.0   RBC  4.30*  4.17*   HEMOGLOBIN  13.2*  12.9*   HEMATOCRIT  40.9*  40.6*   MCV  95.1  97.4   MCH  30.7  30.9   MCHC  32.3*  31.8*   RDW  57.8*  58.4*   PLATELETCT  284  252   MPV  9.0  9.1     Recent Labs      12/19/18   0006  12/20/18   0014   SODIUM  139  141   POTASSIUM  4.9  4.4   CHLORIDE  105  109   CO2  27  23   GLUCOSE  103*  199*   BUN  46*  29*   CREATININE  1.30  1.08   CALCIUM  8.8  8.4*     Recent Labs      12/19/18   0006   INR  0.99         Recent Labs      12/19/18   0028   TRIGLYCERIDE  102   HDL  43   LDL  43       Imaging  DX-FOOT-COMPLETE 3+ LEFT   Final Result         1.  No acute traumatic bony injury.   2.  Severe pes planus deformity with remodeling of the  midfoot bony structures. Appearance suggests Charcot joints.      DX-TIBIA AND FIBULA LEFT   Final Result         1.  No acute traumatic bony injury.   2.  Severe pes planus deformity with extensive degenerative changes and bony remodeling of the midfoot.      MR-ABDOMEN-WITH & W/O    (Results Pending)   CT-CHEST (THORAX) WITH    (Results Pending)        Assessment/Plan  Infected ulcer of skin (HCC)- (present on admission)   Assessment & Plan    Infected left lower extremity venous stasis ulcer  Wound culture grows  Pseudomonas, MRSA, E faecalis and Klebsiella   IV vancomycin and IV Zosyn started on admission  ID consulted - continue zosyn - dc vanco and start zyvox.  Wound vac placed  LPS consulting.         Hyperlipidemia- (present on admission)   Assessment & Plan    Continue atorvastatin 40     Chronic hepatitis C without hepatic coma (HCC)- (present on admission)   Assessment & Plan    In process of workup from GI Dr Park - MRI abdomen with and without and CT chest with ordered as patient missed these appointments for admission  Concern for underlying malignancy.       Anxiety- (present on admission)   Assessment & Plan    Continue Celexa     Stage 3 chronic kidney disease (HCC)- (present on admission)   Assessment & Plan    Maintaining hydration  Avoiding nephrotoxics: NSAIDs etc  Following BMP       HTN (hypertension)- (present on admission)   Assessment & Plan    Continue Norvasc and Inderal     Tobacco use- (present on admission)   Assessment & Plan    Nicotine replacement has been provided          VTE prophylaxis: heparin as no surgery planned

## 2018-12-21 NOTE — CARE PLAN
Problem: Communication  Goal: The ability to communicate needs accurately and effectively will improve    Intervention: Educate patient and significant other/support system about the plan of care, procedures, treatments, medications and allow for questions  Pt educated on plan of care and meds for NOC shift.        Problem: Safety  Goal: Will remain free from injury    Intervention: Provide assistance with mobility  PT is up SBA. Pt has steady gait. Pt now has wound vac to left LE, pt requires assistance when ambulating. Pt educated on use of call light. Pt verbalized understanding and uses call light appropriately.

## 2018-12-21 NOTE — PROGRESS NOTES
[facility] Medicine Daily Progress Note    Date of Service  12/21/2018    Chief Complaint  63 y.o. male admitted 12/18/2018 with non-healing leg ulcer and found to have polymicrobial wound culture and told to present to ED.    Hospital Course    Patient admitted and started on Vanco and zosyn.  LPS consulted and reviewed with surgery, no debridement planned at this time.  Wound vac in place and ID consulted for recommendations.      Interval Problem Update  12/20 Patient states he feels no pain and is doing okay.  He is concerned that he missed the appointments for imaging ordered by his GI physician, Dr Park for evaluation for concern of hepatocellular carcinoma given his history of hepatitis C.  As patient is a high risk for cancer, MRI and CT ordered inpatient for further evaluation.  12/21 Patient feeling well.  With wound vac in place, he cannot have MRI at this time, already received contrast for CT chest so will not have CT abdomen/pelvis with contrast at this time.  CT chest does show a mass in the RUL but it has been watched for years and is continuing to decrease in size and appearance is not typical of a malignancy.  Will order AFP for blood work today to further evaluate for HCC.  Coordination for wound vac on discharge in process as this is anticipated to take many days to do so.    Consultants/Specialty  none    Code Status  full    Disposition  tbd    Review of Systems  Review of Systems   Constitutional: Negative for chills and fever.   HENT: Negative for congestion and sore throat.    Eyes: Negative for blurred vision and photophobia.   Respiratory: Negative for cough and shortness of breath.    Cardiovascular: Negative for chest pain, claudication and leg swelling.   Gastrointestinal: Negative for abdominal pain, constipation, diarrhea, heartburn, nausea and vomiting.   Genitourinary: Negative for dysuria and hematuria.   Musculoskeletal: Negative for joint pain and myalgias.   Skin: Negative for  itching and rash.        Non-healing ulcer L calf   Neurological: Negative for dizziness, sensory change, speech change, weakness and headaches.   Psychiatric/Behavioral: Negative for depression. The patient is not nervous/anxious and does not have insomnia.         Physical Exam  Temp:  [36.4 °C (97.6 °F)-37.2 °C (98.9 °F)] 36.5 °C (97.7 °F)  Pulse:  [50-66] 50  Resp:  [18] 18  BP: (127-174)/(91-97) 127/94    Physical Exam   Constitutional: He is oriented to person, place, and time. He appears well-developed and well-nourished. No distress.   HENT:   Head: Normocephalic and atraumatic.   Eyes: Conjunctivae are normal. No scleral icterus.   Neck: Neck supple. No JVD present.   Cardiovascular: Normal rate, regular rhythm and normal heart sounds.  Exam reveals no gallop and no friction rub.    No murmur heard.  Pulmonary/Chest: Effort normal and breath sounds normal. No respiratory distress. He has no wheezes. He exhibits no tenderness.   Abdominal: Soft. Bowel sounds are normal. He exhibits no distension and no mass. There is no tenderness.   Musculoskeletal: He exhibits no edema or tenderness.   Lymphadenopathy:     He has no cervical adenopathy.   Neurological: He is alert and oriented to person, place, and time. No cranial nerve deficit.   Skin: Skin is warm and dry. He is not diaphoretic. No erythema. No pallor.   Wound vac in place LLE over ulcer     Psychiatric: He has a normal mood and affect. His behavior is normal.   Nursing note and vitals reviewed.      Fluids    Intake/Output Summary (Last 24 hours) at 12/21/18 1447  Last data filed at 12/21/18 0930   Gross per 24 hour   Intake              240 ml   Output                0 ml   Net              240 ml       Laboratory  Recent Labs      12/19/18   0006  12/20/18   0014  12/21/18   1051   WBC  6.6  7.0  5.6   RBC  4.30*  4.17*  4.48*   HEMOGLOBIN  13.2*  12.9*  14.0   HEMATOCRIT  40.9*  40.6*  43.2   MCV  95.1  97.4  96.4   MCH  30.7  30.9  31.3   MCHC   32.3*  31.8*  32.4*   RDW  57.8*  58.4*  57.5*   PLATELETCT  284  252  232   MPV  9.0  9.1  9.0     Recent Labs      12/19/18   0006  12/20/18   0014   SODIUM  139  141   POTASSIUM  4.9  4.4   CHLORIDE  105  109   CO2  27  23   GLUCOSE  103*  199*   BUN  46*  29*   CREATININE  1.30  1.08   CALCIUM  8.8  8.4*     Recent Labs      12/19/18   0006   INR  0.99         Recent Labs      12/19/18   0028   TRIGLYCERIDE  102   HDL  43   LDL  43       Imaging  CT-CHEST (THORAX) WITH   Final Result      Ill-defined right upper lobe nodular opacity with surrounding linear opacities. This could be related to scarring or malignancy. Further evaluation with PET/CT is recommended.      Bibasilar atelectasis.      Cardiomegaly.      Bilateral renal cortical scarring.      Dilated ascending aorta measuring 4 cm.      DX-FOOT-COMPLETE 3+ LEFT   Final Result         1.  No acute traumatic bony injury.   2.  Severe pes planus deformity with remodeling of the midfoot bony structures. Appearance suggests Charcot joints.      DX-TIBIA AND FIBULA LEFT   Final Result         1.  No acute traumatic bony injury.   2.  Severe pes planus deformity with extensive degenerative changes and bony remodeling of the midfoot.      MR-ABDOMEN-WITH & W/O    (Results Pending)        Assessment/Plan  Infected ulcer of skin (HCC)- (present on admission)   Assessment & Plan    Infected left lower extremity venous stasis ulcer  Wound culture grows  Pseudomonas, MRSA, E faecalis and Klebsiella   IV vancomycin and IV Zosyn started on admission  ID consulted - continue zosyn - dc vanco and start zyvox.  Wound vac placed  LPS consulting.         Hyperlipidemia- (present on admission)   Assessment & Plan    Continue atorvastatin 40     Chronic hepatitis C without hepatic coma (HCC)- (present on admission)   Assessment & Plan    In process of workup from GI Dr Park -CT chest completed showing : right upper lobe nodular opacity with surrounding linear opacities,  further evaluation with PET recommended vs serial CT Scan    MRI abdomen postponed as not compatible with wound vac, CT abdomen/pelvis on hold given recent contrast with CT chest.  AFP ordered.         Anxiety- (present on admission)   Assessment & Plan    Continue Celexa     Stage 3 chronic kidney disease (HCC)- (present on admission)   Assessment & Plan    Maintaining hydration  Avoiding nephrotoxics: NSAIDs etc  Following BMP       HTN (hypertension)- (present on admission)   Assessment & Plan    Continue Norvasc and Inderal     Tobacco use- (present on admission)   Assessment & Plan    Nicotine replacement has been provided          VTE prophylaxis: heparin as no surgery planned

## 2018-12-21 NOTE — PROGRESS NOTES
Pt reports pain has increased this shift, 6/10, but tolerable with prn oxycodone. Pt reported mild nausea this AM that was relieved with prn zofran. Pt remains afebrile. Pt is up with SBA, tolerating ambulation well. Wound vac remains in place. BLE remain daisy/dusky. Pt has good appetite, with adequate fluid intake. Fall precautions maintained.

## 2018-12-22 LAB
AFP-TM SERPL-MCNC: 2 NG/ML (ref 0–9)
ANION GAP SERPL CALC-SCNC: 9 MMOL/L (ref 0–11.9)
BASOPHILS # BLD AUTO: 0.9 % (ref 0–1.8)
BASOPHILS # BLD: 0.05 K/UL (ref 0–0.12)
BUN SERPL-MCNC: 29 MG/DL (ref 8–22)
CALCIUM SERPL-MCNC: 8.9 MG/DL (ref 8.5–10.5)
CHLORIDE SERPL-SCNC: 110 MMOL/L (ref 96–112)
CO2 SERPL-SCNC: 21 MMOL/L (ref 20–33)
CREAT SERPL-MCNC: 1.34 MG/DL (ref 0.5–1.4)
EOSINOPHIL # BLD AUTO: 0.26 K/UL (ref 0–0.51)
EOSINOPHIL NFR BLD: 4.6 % (ref 0–6.9)
ERYTHROCYTE [DISTWIDTH] IN BLOOD BY AUTOMATED COUNT: 58.4 FL (ref 35.9–50)
EST. AVERAGE GLUCOSE BLD GHB EST-MCNC: 111 MG/DL
GLUCOSE SERPL-MCNC: 82 MG/DL (ref 65–99)
HBA1C MFR BLD: 5.5 % (ref 0–5.6)
HCT VFR BLD AUTO: 39.6 % (ref 42–52)
HGB BLD-MCNC: 12.7 G/DL (ref 14–18)
IMM GRANULOCYTES # BLD AUTO: 0.01 K/UL (ref 0–0.11)
IMM GRANULOCYTES NFR BLD AUTO: 0.2 % (ref 0–0.9)
LYMPHOCYTES # BLD AUTO: 1.7 K/UL (ref 1–4.8)
LYMPHOCYTES NFR BLD: 30 % (ref 22–41)
MCH RBC QN AUTO: 31.1 PG (ref 27–33)
MCHC RBC AUTO-ENTMCNC: 32.1 G/DL (ref 33.7–35.3)
MCV RBC AUTO: 96.8 FL (ref 81.4–97.8)
MONOCYTES # BLD AUTO: 0.51 K/UL (ref 0–0.85)
MONOCYTES NFR BLD AUTO: 9 % (ref 0–13.4)
NEUTROPHILS # BLD AUTO: 3.13 K/UL (ref 1.82–7.42)
NEUTROPHILS NFR BLD: 55.3 % (ref 44–72)
NRBC # BLD AUTO: 0 K/UL
NRBC BLD-RTO: 0 /100 WBC
PLATELET # BLD AUTO: 234 K/UL (ref 164–446)
PMV BLD AUTO: 9.2 FL (ref 9–12.9)
POTASSIUM SERPL-SCNC: 4.5 MMOL/L (ref 3.6–5.5)
RBC # BLD AUTO: 4.09 M/UL (ref 4.7–6.1)
SODIUM SERPL-SCNC: 140 MMOL/L (ref 135–145)
WBC # BLD AUTO: 5.7 K/UL (ref 4.8–10.8)

## 2018-12-22 PROCEDURE — A9270 NON-COVERED ITEM OR SERVICE: HCPCS | Performed by: INTERNAL MEDICINE

## 2018-12-22 PROCEDURE — 700102 HCHG RX REV CODE 250 W/ 637 OVERRIDE(OP): Performed by: INTERNAL MEDICINE

## 2018-12-22 PROCEDURE — 700105 HCHG RX REV CODE 258: Performed by: INTERNAL MEDICINE

## 2018-12-22 PROCEDURE — 99232 SBSQ HOSP IP/OBS MODERATE 35: CPT | Performed by: INTERNAL MEDICINE

## 2018-12-22 PROCEDURE — 770021 HCHG ROOM/CARE - ISO PRIVATE

## 2018-12-22 PROCEDURE — 700111 HCHG RX REV CODE 636 W/ 250 OVERRIDE (IP): Performed by: INTERNAL MEDICINE

## 2018-12-22 PROCEDURE — 36415 COLL VENOUS BLD VENIPUNCTURE: CPT

## 2018-12-22 PROCEDURE — 83036 HEMOGLOBIN GLYCOSYLATED A1C: CPT

## 2018-12-22 PROCEDURE — 85025 COMPLETE CBC W/AUTO DIFF WBC: CPT

## 2018-12-22 PROCEDURE — 80048 BASIC METABOLIC PNL TOTAL CA: CPT

## 2018-12-22 PROCEDURE — 97605 NEG PRS WND THER DME<=50SQCM: CPT

## 2018-12-22 RX ADMIN — PIPERACILLIN AND TAZOBACTAM 4.5 G: 4; .5 INJECTION, POWDER, LYOPHILIZED, FOR SOLUTION INTRAVENOUS; PARENTERAL at 21:55

## 2018-12-22 RX ADMIN — NICOTINE 14 MG: 14 PATCH, EXTENDED RELEASE TRANSDERMAL at 06:38

## 2018-12-22 RX ADMIN — OXYCODONE HYDROCHLORIDE 10 MG: 10 TABLET ORAL at 11:33

## 2018-12-22 RX ADMIN — PIPERACILLIN AND TAZOBACTAM 4.5 G: 4; .5 INJECTION, POWDER, LYOPHILIZED, FOR SOLUTION INTRAVENOUS; PARENTERAL at 15:04

## 2018-12-22 RX ADMIN — OXYCODONE HYDROCHLORIDE 10 MG: 10 TABLET ORAL at 06:39

## 2018-12-22 RX ADMIN — HEPARIN SODIUM 5000 UNITS: 5000 INJECTION, SOLUTION INTRAVENOUS; SUBCUTANEOUS at 06:38

## 2018-12-22 RX ADMIN — HEPARIN SODIUM 5000 UNITS: 5000 INJECTION, SOLUTION INTRAVENOUS; SUBCUTANEOUS at 15:04

## 2018-12-22 RX ADMIN — OXYCODONE HYDROCHLORIDE 5 MG: 5 TABLET ORAL at 15:36

## 2018-12-22 RX ADMIN — HEPARIN SODIUM 5000 UNITS: 5000 INJECTION, SOLUTION INTRAVENOUS; SUBCUTANEOUS at 21:55

## 2018-12-22 RX ADMIN — OXYCODONE HYDROCHLORIDE 10 MG: 10 TABLET ORAL at 20:10

## 2018-12-22 RX ADMIN — PIPERACILLIN AND TAZOBACTAM 4.5 G: 4; .5 INJECTION, POWDER, LYOPHILIZED, FOR SOLUTION INTRAVENOUS; PARENTERAL at 06:39

## 2018-12-22 RX ADMIN — LINEZOLID 600 MG: 600 TABLET, FILM COATED ORAL at 17:35

## 2018-12-22 RX ADMIN — STANDARDIZED SENNA CONCENTRATE AND DOCUSATE SODIUM 2 TABLET: 8.6; 5 TABLET, FILM COATED ORAL at 17:35

## 2018-12-22 RX ADMIN — LINEZOLID 600 MG: 600 TABLET, FILM COATED ORAL at 06:39

## 2018-12-22 RX ADMIN — PROPRANOLOL HYDROCHLORIDE 10 MG: 10 TABLET ORAL at 06:39

## 2018-12-22 RX ADMIN — AMLODIPINE BESYLATE 5 MG: 5 TABLET ORAL at 06:39

## 2018-12-22 ASSESSMENT — ENCOUNTER SYMPTOMS
SPEECH CHANGE: 0
FEVER: 0
DEPRESSION: 0
VOMITING: 0
COUGH: 0
INSOMNIA: 0
NERVOUS/ANXIOUS: 0
DIARRHEA: 0
ABDOMINAL PAIN: 0
CONSTIPATION: 0
CLAUDICATION: 0
CHILLS: 0
WEAKNESS: 0
NAUSEA: 0
HEADACHES: 0
HEARTBURN: 0
BLURRED VISION: 0
PHOTOPHOBIA: 0
DIZZINESS: 0
SENSORY CHANGE: 0
SORE THROAT: 0
SHORTNESS OF BREATH: 0
MYALGIAS: 0

## 2018-12-22 ASSESSMENT — PAIN SCALES - GENERAL
PAINLEVEL_OUTOF10: 5
PAINLEVEL_OUTOF10: 3
PAINLEVEL_OUTOF10: 3
PAINLEVEL_OUTOF10: 1
PAINLEVEL_OUTOF10: 5

## 2018-12-22 ASSESSMENT — PATIENT HEALTH QUESTIONNAIRE - PHQ9
1. LITTLE INTEREST OR PLEASURE IN DOING THINGS: NOT AT ALL
2. FEELING DOWN, DEPRESSED, IRRITABLE, OR HOPELESS: NOT AT ALL
SUM OF ALL RESPONSES TO PHQ9 QUESTIONS 1 AND 2: 0

## 2018-12-22 NOTE — PROGRESS NOTES
Pt reports pain is tolerable with prn pain medications, denies n/v and remains afebrile. Pt is up SBA, tolerating well. Fall precautions maintained. Wound vac remains in place (changed today by wound care RN), tolerating well.

## 2018-12-22 NOTE — PROGRESS NOTES
Infectious Disease Progress Note    Author: Mayte Fuller M.D. Date & Time of service: 2018  5:30 PM    Chief Complaint:  Infected leg ulcer    Interval History:  63 y.o. male admitted 2018 with non-healing leg ulcer and found to have polymicrobial wound   AF WBC 5.6 tolerating abx well-VAC placed    Labs Reviewed, Medications Reviewed, Radiology Reviewed and Wound Reviewed.    Review of Systems:  Review of Systems   Constitutional: Negative for chills and fever.   Gastrointestinal: Negative for abdominal pain, nausea and vomiting.   Skin: Negative for rash.       Hemodynamics:  Temp (24hrs), Av.7 °C (98.1 °F), Min:36.4 °C (97.6 °F), Max:37.1 °C (98.8 °F)  Temperature: 37.1 °C (98.8 °F)  Pulse  Av.6  Min: 50  Max: 79  Blood Pressure: 140/91       Physical Exam:  Physical Exam   Constitutional: He is oriented to person, place, and time. He appears well-developed.   disheveled   HENT:   Head: Normocephalic and atraumatic.   Eyes: Pupils are equal, round, and reactive to light. EOM are normal.   Neck: Neck supple.   Cardiovascular: Normal rate.    Pulmonary/Chest: Effort normal.   Abdominal: Soft.   Musculoskeletal:   VAC LLE   Neurological: He is alert and oriented to person, place, and time.   Skin: He is not diaphoretic.   tattoos   Nursing note and vitals reviewed.      Meds:    Current Facility-Administered Medications:   •  oxyCODONE immediate-release **OR** oxyCODONE immediate-release  •  piperacillin-tazobactam **AND** [DISCONTINUED] MD Alert...Vancomycin per Pharmacy  •  heparin  •  senna-docusate **AND** polyethylene glycol/lytes **AND** magnesium hydroxide **AND** bisacodyl  •  Respiratory Care per Protocol  •  NS  •  acetaminophen  •  ondansetron  •  ondansetron  •  promethazine  •  promethazine  •  prochlorperazine  •  nicotine **AND** Nicotine Replacement Patient Education Materials **AND** nicotine polacrilex  •  linezolid  •  amLODIPine  •  propranolol    Labs:  Recent  Labs      12/19/18   0006  12/20/18   0014  12/21/18   1051   WBC  6.6  7.0  5.6   RBC  4.30*  4.17*  4.48*   HEMOGLOBIN  13.2*  12.9*  14.0   HEMATOCRIT  40.9*  40.6*  43.2   MCV  95.1  97.4  96.4   MCH  30.7  30.9  31.3   RDW  57.8*  58.4*  57.5*   PLATELETCT  284  252  232   MPV  9.0  9.1  9.0   NEUTSPOLYS  63.60  79.90*  73.00*   LYMPHOCYTES  22.80  10.70*  17.40*   MONOCYTES  9.40  4.70  5.70   EOSINOPHILS  3.20  3.70  2.80   BASOPHILS  0.80  0.70  0.70     Recent Labs      12/19/18   0006  12/20/18   0014   SODIUM  139  141   POTASSIUM  4.9  4.4   CHLORIDE  105  109   CO2  27  23   GLUCOSE  103*  199*   BUN  46*  29*     Recent Labs      12/19/18   0006  12/20/18   0014   ALBUMIN  3.7   --    TBILIRUBIN  0.3   --    ALKPHOSPHAT  71   --    TOTPROTEIN  7.0   --    ALTSGPT  37   --    ASTSGOT  35   --    CREATININE  1.30  1.08       Imaging:  Ct-chest (thorax) With    Result Date: 12/20/2018 12/20/2018 7:01 PM HISTORY/REASON FOR EXAM: Smoker. Evaluate for malignancy. 30 pack-year smoking history. Spiculated nodule on prior plain film TECHNIQUE/EXAM DESCRIPTION: CT scan of the chest with contrast. Helical images were obtained from the lung apices through the adrenal glands following the bolus administration of  70 mL of Omnipaque 350 nonionic contrast. Sagittal and coronal reconstructions were performed. Low dose optimization technique was utilized for this CT exam including automated exposure control and adjustment of the mA and/or kV according to patient size. COMPARISON:  Plain film of the chest 12/5/2018. FINDINGS: Atherosclerotic plaque is seen in the aorta. Ascending aorta measures 4 cm in diameter. The heart is enlarged. No pericardial or pleural effusion is identified. No filling defects are seen within the large central pulmonary arteries. There are small mediastinal lymph nodes. There is no hilar lymphadenopathy. There are multiple small axillary lymph nodes bilaterally. There is mild left apical  scarring. There is an ill-defined opacity at the in the right upper lobe with adjacent linear opacity. The nodular component measures 1 x 0.95 cm. There is bibasilar atelectasis. No pneumothorax is identified. Central airways are patent. Limited views were obtained of the upper abdomen. There is renal cortical scarring involving the kidneys bilaterally. Mild degenerative changes are seen in the spine. There are postsurgical and old posttraumatic changes of the proximal left humerus.     Ill-defined right upper lobe nodular opacity with surrounding linear opacities. This could be related to scarring or malignancy. Further evaluation with PET/CT is recommended. Bibasilar atelectasis. Cardiomegaly. Bilateral renal cortical scarring. Dilated ascending aorta measuring 4 cm.    Dx-chest-2 Views    Result Date: 12/5/2018 12/5/2018 9:09 AM HISTORY/REASON FOR EXAM:  Abnormal ultrasound.  Smoking history.  Hepatitis C and anemia. TECHNIQUE/EXAM DESCRIPTION AND NUMBER OF VIEWS: Two views of the chest. COMPARISON:  None available. FINDINGS: Cardiomediastinal contour is within normal limits. Nodular densities project over both lower lungs, likely nipple shadows. Small apparent spiculated nodular density projects over the RIGHT lung apex measuring approximately 9 mm.  Not demonstrated on lateral view. No pleural fluid collection or pneumothorax. No major bony abnormality is seen.     1.  No pneumonia or pneumothorax. 2.  Possible spiculated nodule at the RIGHT lung apex measuring 9 mm.  Recommend further evaluation with CT chest. 3.  Probable bibasilar nipple shadows.    Dx-foot-complete 3+ Left    Result Date: 12/19/2018 12/19/2018 12:52 AM HISTORY/REASON FOR EXAM:  Left foot pain without trauma TECHNIQUE/EXAM DESCRIPTION:  AP, lateral, and oblique views of the LEFT foot. COMPARISON:  None. FINDINGS: No acute fracture is seen. There is severe pes planus deformity identified with bony remodeling of the midfoot bony structures.  "    1.  No acute traumatic bony injury. 2.  Severe pes planus deformity with remodeling of the midfoot bony structures. Appearance suggests Charcot joints.    Dx-tibia And Fibula Left    Result Date: 12/19/2018 12/19/2018 12:52 AM HISTORY/REASON FOR EXAM:  Atraumatic leg pain. TECHNIQUE/EXAM DESCRIPTION:  AP and lateral views of the LEFT tibia and fibula. COMPARISON:  None. FINDINGS: There is no visualized acute fracture. There is severe pes planus deformity identified with extensive degenerative changes and bony remodeling of the midfoot.     1.  No acute traumatic bony injury. 2.  Severe pes planus deformity with extensive degenerative changes and bony remodeling of the midfoot.      Micro:  Results     Procedure Component Value Units Date/Time    BLOOD CULTURE x2 [[id number]] Collected:  12/19/18 0006    Order Status:  Completed Specimen:  Blood from Peripheral Updated:  12/20/18 0823     Significant Indicator NEG     Source BLD     Site PERIPHERAL     Blood Culture No Growth    Note: Blood cultures are incubated for 5 days and  are monitored continuously.Positive blood cultures  are called to the RN and reported as soon as  they are identified.      Narrative:       Per Hospital Policy: Only change Specimen Src: to \"Line\" if  specified by physician order.    BLOOD CULTURE x2 [[id number]] Collected:  12/19/18 0031    Order Status:  Completed Specimen:  Blood from Peripheral Updated:  12/20/18 0823     Significant Indicator NEG     Source BLD     Site PERIPHERAL     Blood Culture No Growth    Note: Blood cultures are incubated for 5 days and  are monitored continuously.Positive blood cultures  are called to the RN and reported as soon as  they are identified.      Narrative:       Per Hospital Policy: Only change Specimen Src: to \"Line\" if  specified by physician order.      Culture & Susceptibility     ENTEROCOCCUS FAECALIS     Antibiotic Sensitivity Microscan Unit Status   Ampicillin Sensitive <=2 mcg/mL Final "   Daptomycin Sensitive 2 mcg/mL Final   Gent Synergy Resistant >500 mcg/mL Final   Penicillin Sensitive 2 mcg/mL Final   Strep Synergy Sensitive <=1000 mcg/mL Final   Vancomycin Sensitive 2 mcg/mL Final         KLEBSIELLA OXYTOCA     Antibiotic Sensitivity Microscan Unit Status   Ampicillin Intermediate 16 mcg/mL Final   Cefepime Sensitive <=8 mcg/mL Final   Cefotaxime Sensitive <=2 mcg/mL Final   Cefotetan Sensitive <=16 mcg/mL Final   Ceftazidime Sensitive <=1 mcg/mL Final   Ceftriaxone Sensitive <=8 mcg/mL Final   Cefuroxime Sensitive <=4 mcg/mL Final   Ciprofloxacin Sensitive <=1 mcg/mL Final   Ertapenem Sensitive <=1 mcg/mL Final   Gentamicin Sensitive <=4 mcg/mL Final   Pip/Tazobactam Sensitive <=16 mcg/mL Final   Tigecycline Sensitive <=2 mcg/mL Final   Tobramycin Sensitive <=4 mcg/mL Final   Trimeth/Sulfa Sensitive <=2/38 mcg/mL Final         METHICILLIN RESISTANT STAPHYLOCOCCUS AUREUS     Antibiotic Sensitivity Microscan Unit Status   Ampicillin/sulbactam Resistant 16/8 mcg/mL Final   Clindamycin Sensitive <=0.5 mcg/mL Final   Daptomycin Sensitive <=0.5 mcg/mL Final   Erythromycin Resistant >4 mcg/mL Final   Moxifloxacin Sensitive 1 mcg/mL Final   Oxacillin Resistant >2 mcg/mL Final   Penicillin Resistant >8 mcg/mL Final   Tetracycline Sensitive <=4 mcg/mL Final   Trimeth/Sulfa Sensitive <=0.5/9.5 mcg/mL Final   Vancomycin Sensitive 1 mcg/mL Final         PSEUDOMONAS AERUGINOSA     Antibiotic Sensitivity Microscan Unit Status   Amikacin Sensitive <=16 mcg/mL Final   Cefepime Sensitive <=8 mcg/mL Final   Ceftazidime Sensitive 4 mcg/mL Final   Ciprofloxacin Sensitive <=1 mcg/mL Final   Gentamicin Sensitive <=4 mcg/mL Final   Imipenem Sensitive <=1 mcg/mL Final   Meropenem Sensitive <=1 mcg/mL Final   Pip/Tazobactam Sensitive <=16 mcg/mL Final   Tobramycin Sensitive <=4 mcg/mL Final                   Assessment:  Active Hospital Problems    Diagnosis   • Infected ulcer of skin (HCC) [L98.499, L08.9]   •  Hyperlipidemia [E78.5]   • Chronic hepatitis C without hepatic coma (HCC) [B18.2]   • Anxiety [F41.9]   • Stage 3 chronic kidney disease (HCC) [N18.3]   • HTN (hypertension) [I10]   • Tobacco use [Z72.0]       Plan:    Infected ulcer of leg   Infected left lower extremity venous stasis ulcer  Afebrile  No leukocytosis  Wound culture grows  Pseudomonas, MRSA, E faecalis and Klebsiella   Continue IV Zosyn and zyvox for now  Wound vac placed  LPS consulting.  -can change to PO quinolone at discharge with Zyvox  Stop date 1/2/2019    Chronic hepatitis C without hepatic coma   In process of workup from GI Dr Park -   MRI abdomen with and without and CT chest with ordered as patient missed these appointments for admission   FU GI for treatment    Lung nodule  Concern for underlying malignancy.  Biopsy if feasible      Chronic kidney disease   Maintaining hydration  Avoiding nephrotoxics: NSAIDs etc

## 2018-12-22 NOTE — PROGRESS NOTES
Pt A&Ox4. Medicated per MAR for 5/10 left leg pain; tolerated well. Voiding in urinal. Pt with wound vac to left posterior calf; to continuous suction; 100 mL of serous fluid/output so far. Isolation precautions in place. Pt able to make needs known. All needs met at this time.

## 2018-12-23 LAB
ANION GAP SERPL CALC-SCNC: 6 MMOL/L (ref 0–11.9)
BASOPHILS # BLD AUTO: 0.8 % (ref 0–1.8)
BASOPHILS # BLD: 0.05 K/UL (ref 0–0.12)
BUN SERPL-MCNC: 28 MG/DL (ref 8–22)
CALCIUM SERPL-MCNC: 9.1 MG/DL (ref 8.5–10.5)
CHLORIDE SERPL-SCNC: 108 MMOL/L (ref 96–112)
CO2 SERPL-SCNC: 22 MMOL/L (ref 20–33)
CREAT SERPL-MCNC: 1.35 MG/DL (ref 0.5–1.4)
EOSINOPHIL # BLD AUTO: 0.33 K/UL (ref 0–0.51)
EOSINOPHIL NFR BLD: 5.6 % (ref 0–6.9)
ERYTHROCYTE [DISTWIDTH] IN BLOOD BY AUTOMATED COUNT: 57.7 FL (ref 35.9–50)
GLUCOSE SERPL-MCNC: 86 MG/DL (ref 65–99)
HCT VFR BLD AUTO: 41.8 % (ref 42–52)
HGB BLD-MCNC: 13.1 G/DL (ref 14–18)
IMM GRANULOCYTES # BLD AUTO: 0 K/UL (ref 0–0.11)
IMM GRANULOCYTES NFR BLD AUTO: 0 % (ref 0–0.9)
LYMPHOCYTES # BLD AUTO: 1.73 K/UL (ref 1–4.8)
LYMPHOCYTES NFR BLD: 29.2 % (ref 22–41)
MCH RBC QN AUTO: 30.5 PG (ref 27–33)
MCHC RBC AUTO-ENTMCNC: 31.3 G/DL (ref 33.7–35.3)
MCV RBC AUTO: 97.4 FL (ref 81.4–97.8)
MONOCYTES # BLD AUTO: 0.45 K/UL (ref 0–0.85)
MONOCYTES NFR BLD AUTO: 7.6 % (ref 0–13.4)
NEUTROPHILS # BLD AUTO: 3.36 K/UL (ref 1.82–7.42)
NEUTROPHILS NFR BLD: 56.8 % (ref 44–72)
NRBC # BLD AUTO: 0 K/UL
NRBC BLD-RTO: 0 /100 WBC
PLATELET # BLD AUTO: 235 K/UL (ref 164–446)
PMV BLD AUTO: 9.3 FL (ref 9–12.9)
POTASSIUM SERPL-SCNC: 4.6 MMOL/L (ref 3.6–5.5)
RBC # BLD AUTO: 4.29 M/UL (ref 4.7–6.1)
SODIUM SERPL-SCNC: 136 MMOL/L (ref 135–145)
WBC # BLD AUTO: 5.9 K/UL (ref 4.8–10.8)

## 2018-12-23 PROCEDURE — 80048 BASIC METABOLIC PNL TOTAL CA: CPT

## 2018-12-23 PROCEDURE — 770021 HCHG ROOM/CARE - ISO PRIVATE

## 2018-12-23 PROCEDURE — A9270 NON-COVERED ITEM OR SERVICE: HCPCS | Performed by: INTERNAL MEDICINE

## 2018-12-23 PROCEDURE — 85025 COMPLETE CBC W/AUTO DIFF WBC: CPT

## 2018-12-23 PROCEDURE — 99232 SBSQ HOSP IP/OBS MODERATE 35: CPT | Performed by: INTERNAL MEDICINE

## 2018-12-23 PROCEDURE — 700102 HCHG RX REV CODE 250 W/ 637 OVERRIDE(OP): Performed by: INTERNAL MEDICINE

## 2018-12-23 PROCEDURE — 700105 HCHG RX REV CODE 258: Performed by: INTERNAL MEDICINE

## 2018-12-23 PROCEDURE — 700111 HCHG RX REV CODE 636 W/ 250 OVERRIDE (IP): Performed by: INTERNAL MEDICINE

## 2018-12-23 PROCEDURE — 36415 COLL VENOUS BLD VENIPUNCTURE: CPT

## 2018-12-23 RX ORDER — HYDRALAZINE HYDROCHLORIDE 20 MG/ML
20 INJECTION INTRAMUSCULAR; INTRAVENOUS EVERY 6 HOURS PRN
Status: DISCONTINUED | OUTPATIENT
Start: 2018-12-23 | End: 2018-12-28 | Stop reason: HOSPADM

## 2018-12-23 RX ADMIN — HEPARIN SODIUM 5000 UNITS: 5000 INJECTION, SOLUTION INTRAVENOUS; SUBCUTANEOUS at 22:12

## 2018-12-23 RX ADMIN — AMLODIPINE BESYLATE 5 MG: 5 TABLET ORAL at 05:07

## 2018-12-23 RX ADMIN — OXYCODONE HYDROCHLORIDE 5 MG: 5 TABLET ORAL at 05:07

## 2018-12-23 RX ADMIN — PIPERACILLIN AND TAZOBACTAM 4.5 G: 4; .5 INJECTION, POWDER, LYOPHILIZED, FOR SOLUTION INTRAVENOUS; PARENTERAL at 15:08

## 2018-12-23 RX ADMIN — HYDRALAZINE HYDROCHLORIDE 20 MG: 20 INJECTION INTRAMUSCULAR; INTRAVENOUS at 18:29

## 2018-12-23 RX ADMIN — PIPERACILLIN AND TAZOBACTAM 4.5 G: 4; .5 INJECTION, POWDER, LYOPHILIZED, FOR SOLUTION INTRAVENOUS; PARENTERAL at 22:12

## 2018-12-23 RX ADMIN — OXYCODONE HYDROCHLORIDE 5 MG: 5 TABLET ORAL at 09:05

## 2018-12-23 RX ADMIN — LINEZOLID 600 MG: 600 TABLET, FILM COATED ORAL at 05:07

## 2018-12-23 RX ADMIN — OXYCODONE HYDROCHLORIDE 10 MG: 10 TABLET ORAL at 20:28

## 2018-12-23 RX ADMIN — PROPRANOLOL HYDROCHLORIDE 10 MG: 10 TABLET ORAL at 05:06

## 2018-12-23 RX ADMIN — OXYCODONE HYDROCHLORIDE 5 MG: 5 TABLET ORAL at 15:08

## 2018-12-23 RX ADMIN — PIPERACILLIN AND TAZOBACTAM 4.5 G: 4; .5 INJECTION, POWDER, LYOPHILIZED, FOR SOLUTION INTRAVENOUS; PARENTERAL at 05:07

## 2018-12-23 RX ADMIN — HEPARIN SODIUM 5000 UNITS: 5000 INJECTION, SOLUTION INTRAVENOUS; SUBCUTANEOUS at 15:08

## 2018-12-23 RX ADMIN — LINEZOLID 600 MG: 600 TABLET, FILM COATED ORAL at 17:49

## 2018-12-23 RX ADMIN — NICOTINE 14 MG: 14 PATCH, EXTENDED RELEASE TRANSDERMAL at 05:07

## 2018-12-23 RX ADMIN — HEPARIN SODIUM 5000 UNITS: 5000 INJECTION, SOLUTION INTRAVENOUS; SUBCUTANEOUS at 05:06

## 2018-12-23 ASSESSMENT — ENCOUNTER SYMPTOMS
MYALGIAS: 0
COUGH: 0
DIZZINESS: 0
ABDOMINAL PAIN: 0
CLAUDICATION: 0
HEADACHES: 0
CONSTIPATION: 0
NAUSEA: 0
DIARRHEA: 0
HEARTBURN: 0
CHILLS: 0
NERVOUS/ANXIOUS: 0
VOMITING: 0
BLURRED VISION: 0
INSOMNIA: 0
WEAKNESS: 0
PHOTOPHOBIA: 0
SORE THROAT: 0
SPEECH CHANGE: 0
DEPRESSION: 0
FEVER: 0
SENSORY CHANGE: 0
SHORTNESS OF BREATH: 0

## 2018-12-23 ASSESSMENT — PAIN SCALES - GENERAL
PAINLEVEL_OUTOF10: 5
PAINLEVEL_OUTOF10: 3
PAINLEVEL_OUTOF10: 3
PAINLEVEL_OUTOF10: 5
PAINLEVEL_OUTOF10: 4

## 2018-12-23 NOTE — CARE PLAN
Problem: Safety  Goal: Will remain free from injury    Intervention: Provide assistance with mobility  Pt is A&Ox4, up standby assist with a steady gait. Pt does have numbness in his feet, calls appropriately when ambulating.       Problem: Pain Management  Goal: Pain level will decrease to patient's comfort goal    Intervention: Follow pain managment plan developed in collaboration with patient and Interdisciplinary Team  Patient reports pain using 1-10 scale. Medicated per MAR.

## 2018-12-23 NOTE — PROGRESS NOTES
[facility] Medicine Daily Progress Note    Date of Service  12/22/2018    Chief Complaint  63 y.o. male admitted 12/18/2018 with non-healing leg ulcer and found to have polymicrobial wound culture and told to present to ED.    Hospital Course    Patient admitted and started on Vanco and zosyn.  LPS consulted and reviewed with surgery, no debridement planned at this time.  Wound vac in place and ID consulted for recommendations.      Interval Problem Update  12/20 Patient states he feels no pain and is doing okay.  He is concerned that he missed the appointments for imaging ordered by his GI physician, Dr Park for evaluation for concern of hepatocellular carcinoma given his history of hepatitis C.  As patient is a high risk for cancer, MRI and CT ordered inpatient for further evaluation.  12/21 Patient feeling well.  With wound vac in place, he cannot have MRI at this time, already received contrast for CT chest so will not have CT abdomen/pelvis with contrast at this time.  CT chest does show a mass in the RUL but it has been watched for years and is continuing to decrease in size and appearance is not typical of a malignancy.  Will order AFP for blood work today to further evaluate for HCC.  Coordination for wound vac on discharge in process as this is anticipated to take many days to do so.  12/22 Patient without significant changes overnight, states he feels well and is tolerating the infusions without issue.  His appetite is good along and normal urination and bowel movements.    Consultants/Specialty  none    Code Status  full    Disposition  tbd    Review of Systems  Review of Systems   Constitutional: Negative for chills and fever.   HENT: Negative for congestion and sore throat.    Eyes: Negative for blurred vision and photophobia.   Respiratory: Negative for cough and shortness of breath.    Cardiovascular: Negative for chest pain, claudication and leg swelling.   Gastrointestinal: Negative for abdominal  pain, constipation, diarrhea, heartburn, nausea and vomiting.   Genitourinary: Negative for dysuria and hematuria.   Musculoskeletal: Negative for joint pain and myalgias.   Skin: Negative for itching and rash.        Non-healing ulcer L calf   Neurological: Negative for dizziness, sensory change, speech change, weakness and headaches.   Psychiatric/Behavioral: Negative for depression. The patient is not nervous/anxious and does not have insomnia.         Physical Exam  Temp:  [36.7 °C (98 °F)-37.1 °C (98.8 °F)] 36.7 °C (98 °F)  Pulse:  [46-66] 62  Resp:  [16-18] 16  BP: (121-140)/(79-91) 130/86    Physical Exam   Constitutional: He is oriented to person, place, and time. He appears well-developed and well-nourished. No distress.   HENT:   Head: Normocephalic and atraumatic.   Eyes: Conjunctivae are normal. No scleral icterus.   Neck: Neck supple. No JVD present.   Cardiovascular: Normal rate, regular rhythm and normal heart sounds.  Exam reveals no gallop and no friction rub.    No murmur heard.  Pulmonary/Chest: Effort normal and breath sounds normal. No respiratory distress. He has no wheezes. He exhibits no tenderness.   Abdominal: Soft. Bowel sounds are normal. He exhibits no distension and no mass. There is no tenderness.   Musculoskeletal: He exhibits no edema or tenderness.   Lymphadenopathy:     He has no cervical adenopathy.   Neurological: He is alert and oriented to person, place, and time. No cranial nerve deficit.   Skin: Skin is warm and dry. He is not diaphoretic. No erythema. No pallor.   Wound vac in place LLE over ulcer     Psychiatric: He has a normal mood and affect. His behavior is normal.   Nursing note and vitals reviewed.      Fluids    Intake/Output Summary (Last 24 hours) at 12/22/18 1624  Last data filed at 12/22/18 0500   Gross per 24 hour   Intake             2121 ml   Output              175 ml   Net             1946 ml       Laboratory  Recent Labs      12/20/18   0014  12/21/18    1051  12/22/18   0036   WBC  7.0  5.6  5.7   RBC  4.17*  4.48*  4.09*   HEMOGLOBIN  12.9*  14.0  12.7*   HEMATOCRIT  40.6*  43.2  39.6*   MCV  97.4  96.4  96.8   MCH  30.9  31.3  31.1   MCHC  31.8*  32.4*  32.1*   RDW  58.4*  57.5*  58.4*   PLATELETCT  252  232  234   MPV  9.1  9.0  9.2     Recent Labs      12/20/18   0014  12/22/18   0036   SODIUM  141  140   POTASSIUM  4.4  4.5   CHLORIDE  109  110   CO2  23  21   GLUCOSE  199*  82   BUN  29*  29*   CREATININE  1.08  1.34   CALCIUM  8.4*  8.9                   Imaging  CT-CHEST (THORAX) WITH   Final Result      Ill-defined right upper lobe nodular opacity with surrounding linear opacities. This could be related to scarring or malignancy. Further evaluation with PET/CT is recommended.      Bibasilar atelectasis.      Cardiomegaly.      Bilateral renal cortical scarring.      Dilated ascending aorta measuring 4 cm.      DX-FOOT-COMPLETE 3+ LEFT   Final Result         1.  No acute traumatic bony injury.   2.  Severe pes planus deformity with remodeling of the midfoot bony structures. Appearance suggests Charcot joints.      DX-TIBIA AND FIBULA LEFT   Final Result         1.  No acute traumatic bony injury.   2.  Severe pes planus deformity with extensive degenerative changes and bony remodeling of the midfoot.      MR-ABDOMEN-WITH & W/O    (Results Pending)        Assessment/Plan  Infected ulcer of skin (HCC)- (present on admission)   Assessment & Plan    Infected left lower extremity venous stasis ulcer  Wound culture grows  Pseudomonas, MRSA, E faecalis and Klebsiella   IV vancomycin and IV Zosyn started on admission  ID consulted - continue zosyn - dc vanco and start zyvox.  Wound vac placed, outpatient wound vac being coordinated.  LPS consulting.  ABX through 1/2/19 - quinolone can be substituted for zosyn on dc per ID           Hyperlipidemia- (present on admission)   Assessment & Plan    Continue atorvastatin 40     Chronic hepatitis C without hepatic coma  (HCC)- (present on admission)   Assessment & Plan    In process of workup from GI Dr Park -CT chest completed showing : right upper lobe nodular opacity with surrounding linear opacities, further evaluation with PET recommended vs serial CT Scan    MRI abdomen postponed as not compatible with wound vac, CT abdomen/pelvis on hold given recent contrast with CT chest.  AFP ordered.         Anxiety- (present on admission)   Assessment & Plan    Continue Celexa     Stage 3 chronic kidney disease (HCC)- (present on admission)   Assessment & Plan    Maintaining hydration  Avoiding nephrotoxics: NSAIDs etc  Following BMP       HTN (hypertension)- (present on admission)   Assessment & Plan    Continue Norvasc and Inderal     Tobacco use- (present on admission)   Assessment & Plan    Nicotine replacement has been provided          VTE prophylaxis: heparin as no surgery planned

## 2018-12-23 NOTE — PROGRESS NOTES
Assumed care of patient at 1900. Pt is A&Ox4, up standby assist with a steady gait. Wound vac in place to left lower leg. Pt reports 5/10 left lower leg pain, medicated per MAR. No further needs at this time, will continue to round hourly.

## 2018-12-23 NOTE — PROGRESS NOTES
Infectious Disease Progress Note    Author: Mayte Fuller M.D. Date & Time of service: 2018  6:06 PM    Chief Complaint:  Infected leg ulcer    Interval History:  63 y.o. male admitted 2018 with non-healing leg ulcer and found to have polymicrobial wound   AF WBC 5.6 tolerating abx well-VAC placed   AF tolerating abx well-no complaints    Labs Reviewed, Medications Reviewed, Radiology Reviewed and Wound Reviewed.    Review of Systems:  Review of Systems   Constitutional: Negative for chills and fever.   Cardiovascular: Negative for chest pain.   Gastrointestinal: Negative for abdominal pain, nausea and vomiting.   Skin: Negative for rash.   All other systems reviewed and are negative.      Hemodynamics:  Temp (24hrs), Av.8 °C (98.3 °F), Min:36.7 °C (98 °F), Max:36.9 °C (98.5 °F)  Temperature: 36.9 °C (98.5 °F)  Pulse  Av.9  Min: 46  Max: 79  Blood Pressure: 139/80       Physical Exam:  Physical Exam   Constitutional: He is oriented to person, place, and time. He appears well-developed.   disheveled   HENT:   Head: Normocephalic and atraumatic.   Eyes: Pupils are equal, round, and reactive to light. EOM are normal.   Neck: Neck supple.   Cardiovascular: Normal rate.    No murmur heard.  Pulmonary/Chest: Breath sounds normal. No respiratory distress. He has no wheezes.   Abdominal: Soft. He exhibits no distension. There is no tenderness.   Musculoskeletal: He exhibits no edema.   VAC LLE   Neurological: He is alert and oriented to person, place, and time.   Skin: He is not diaphoretic.   tattoos   Nursing note and vitals reviewed.      Meds:    Current Facility-Administered Medications:   •  oxyCODONE immediate-release **OR** oxyCODONE immediate-release  •  piperacillin-tazobactam **AND** [DISCONTINUED] MD Alert...Vancomycin per Pharmacy  •  heparin  •  senna-docusate **AND** polyethylene glycol/lytes **AND** magnesium hydroxide **AND** bisacodyl  •  Respiratory Care per  Protocol  •  NS  •  acetaminophen  •  ondansetron  •  ondansetron  •  promethazine  •  promethazine  •  prochlorperazine  •  nicotine **AND** Nicotine Replacement Patient Education Materials **AND** nicotine polacrilex  •  linezolid  •  amLODIPine  •  propranolol    Labs:  Recent Labs      12/20/18   0014  12/21/18   1051  12/22/18   0036   WBC  7.0  5.6  5.7   RBC  4.17*  4.48*  4.09*   HEMOGLOBIN  12.9*  14.0  12.7*   HEMATOCRIT  40.6*  43.2  39.6*   MCV  97.4  96.4  96.8   MCH  30.9  31.3  31.1   RDW  58.4*  57.5*  58.4*   PLATELETCT  252  232  234   MPV  9.1  9.0  9.2   NEUTSPOLYS  79.90*  73.00*  55.30   LYMPHOCYTES  10.70*  17.40*  30.00   MONOCYTES  4.70  5.70  9.00   EOSINOPHILS  3.70  2.80  4.60   BASOPHILS  0.70  0.70  0.90     Recent Labs      12/20/18   0014  12/22/18   0036   SODIUM  141  140   POTASSIUM  4.4  4.5   CHLORIDE  109  110   CO2  23  21   GLUCOSE  199*  82   BUN  29*  29*     Recent Labs      12/20/18   0014  12/22/18   0036   CREATININE  1.08  1.34       Imaging:  Ct-chest (thorax) With    Result Date: 12/20/2018 12/20/2018 7:01 PM HISTORY/REASON FOR EXAM: Smoker. Evaluate for malignancy. 30 pack-year smoking history. Spiculated nodule on prior plain film TECHNIQUE/EXAM DESCRIPTION: CT scan of the chest with contrast. Helical images were obtained from the lung apices through the adrenal glands following the bolus administration of  70 mL of Omnipaque 350 nonionic contrast. Sagittal and coronal reconstructions were performed. Low dose optimization technique was utilized for this CT exam including automated exposure control and adjustment of the mA and/or kV according to patient size. COMPARISON:  Plain film of the chest 12/5/2018. FINDINGS: Atherosclerotic plaque is seen in the aorta. Ascending aorta measures 4 cm in diameter. The heart is enlarged. No pericardial or pleural effusion is identified. No filling defects are seen within the large central pulmonary arteries. There are small  mediastinal lymph nodes. There is no hilar lymphadenopathy. There are multiple small axillary lymph nodes bilaterally. There is mild left apical scarring. There is an ill-defined opacity at the in the right upper lobe with adjacent linear opacity. The nodular component measures 1 x 0.95 cm. There is bibasilar atelectasis. No pneumothorax is identified. Central airways are patent. Limited views were obtained of the upper abdomen. There is renal cortical scarring involving the kidneys bilaterally. Mild degenerative changes are seen in the spine. There are postsurgical and old posttraumatic changes of the proximal left humerus.     Ill-defined right upper lobe nodular opacity with surrounding linear opacities. This could be related to scarring or malignancy. Further evaluation with PET/CT is recommended. Bibasilar atelectasis. Cardiomegaly. Bilateral renal cortical scarring. Dilated ascending aorta measuring 4 cm.    Dx-chest-2 Views    Result Date: 12/5/2018 12/5/2018 9:09 AM HISTORY/REASON FOR EXAM:  Abnormal ultrasound.  Smoking history.  Hepatitis C and anemia. TECHNIQUE/EXAM DESCRIPTION AND NUMBER OF VIEWS: Two views of the chest. COMPARISON:  None available. FINDINGS: Cardiomediastinal contour is within normal limits. Nodular densities project over both lower lungs, likely nipple shadows. Small apparent spiculated nodular density projects over the RIGHT lung apex measuring approximately 9 mm.  Not demonstrated on lateral view. No pleural fluid collection or pneumothorax. No major bony abnormality is seen.     1.  No pneumonia or pneumothorax. 2.  Possible spiculated nodule at the RIGHT lung apex measuring 9 mm.  Recommend further evaluation with CT chest. 3.  Probable bibasilar nipple shadows.    Dx-foot-complete 3+ Left    Result Date: 12/19/2018 12/19/2018 12:52 AM HISTORY/REASON FOR EXAM:  Left foot pain without trauma TECHNIQUE/EXAM DESCRIPTION:  AP, lateral, and oblique views of the LEFT foot. COMPARISON:   "None. FINDINGS: No acute fracture is seen. There is severe pes planus deformity identified with bony remodeling of the midfoot bony structures.     1.  No acute traumatic bony injury. 2.  Severe pes planus deformity with remodeling of the midfoot bony structures. Appearance suggests Charcot joints.    Dx-tibia And Fibula Left    Result Date: 12/19/2018 12/19/2018 12:52 AM HISTORY/REASON FOR EXAM:  Atraumatic leg pain. TECHNIQUE/EXAM DESCRIPTION:  AP and lateral views of the LEFT tibia and fibula. COMPARISON:  None. FINDINGS: There is no visualized acute fracture. There is severe pes planus deformity identified with extensive degenerative changes and bony remodeling of the midfoot.     1.  No acute traumatic bony injury. 2.  Severe pes planus deformity with extensive degenerative changes and bony remodeling of the midfoot.      Micro:  Results     Procedure Component Value Units Date/Time    BLOOD CULTURE x2 [[id number]] Collected:  12/19/18 0006    Order Status:  Completed Specimen:  Blood from Peripheral Updated:  12/20/18 0823     Significant Indicator NEG     Source BLD     Site PERIPHERAL     Blood Culture No Growth    Note: Blood cultures are incubated for 5 days and  are monitored continuously.Positive blood cultures  are called to the RN and reported as soon as  they are identified.      Narrative:       Per Hospital Policy: Only change Specimen Src: to \"Line\" if  specified by physician order.    BLOOD CULTURE x2 [[id number]] Collected:  12/19/18 0031    Order Status:  Completed Specimen:  Blood from Peripheral Updated:  12/20/18 0823     Significant Indicator NEG     Source BLD     Site PERIPHERAL     Blood Culture No Growth    Note: Blood cultures are incubated for 5 days and  are monitored continuously.Positive blood cultures  are called to the RN and reported as soon as  they are identified.      Narrative:       Per Hospital Policy: Only change Specimen Src: to \"Line\" if  specified by physician order.    "   Culture & Susceptibility     ENTEROCOCCUS FAECALIS     Antibiotic Sensitivity Microscan Unit Status   Ampicillin Sensitive <=2 mcg/mL Final   Daptomycin Sensitive 2 mcg/mL Final   Gent Synergy Resistant >500 mcg/mL Final   Penicillin Sensitive 2 mcg/mL Final   Strep Synergy Sensitive <=1000 mcg/mL Final   Vancomycin Sensitive 2 mcg/mL Final         KLEBSIELLA OXYTOCA     Antibiotic Sensitivity Microscan Unit Status   Ampicillin Intermediate 16 mcg/mL Final   Cefepime Sensitive <=8 mcg/mL Final   Cefotaxime Sensitive <=2 mcg/mL Final   Cefotetan Sensitive <=16 mcg/mL Final   Ceftazidime Sensitive <=1 mcg/mL Final   Ceftriaxone Sensitive <=8 mcg/mL Final   Cefuroxime Sensitive <=4 mcg/mL Final   Ciprofloxacin Sensitive <=1 mcg/mL Final   Ertapenem Sensitive <=1 mcg/mL Final   Gentamicin Sensitive <=4 mcg/mL Final   Pip/Tazobactam Sensitive <=16 mcg/mL Final   Tigecycline Sensitive <=2 mcg/mL Final   Tobramycin Sensitive <=4 mcg/mL Final   Trimeth/Sulfa Sensitive <=2/38 mcg/mL Final         METHICILLIN RESISTANT STAPHYLOCOCCUS AUREUS     Antibiotic Sensitivity Microscan Unit Status   Ampicillin/sulbactam Resistant 16/8 mcg/mL Final   Clindamycin Sensitive <=0.5 mcg/mL Final   Daptomycin Sensitive <=0.5 mcg/mL Final   Erythromycin Resistant >4 mcg/mL Final   Moxifloxacin Sensitive 1 mcg/mL Final   Oxacillin Resistant >2 mcg/mL Final   Penicillin Resistant >8 mcg/mL Final   Tetracycline Sensitive <=4 mcg/mL Final   Trimeth/Sulfa Sensitive <=0.5/9.5 mcg/mL Final   Vancomycin Sensitive 1 mcg/mL Final         PSEUDOMONAS AERUGINOSA     Antibiotic Sensitivity Microscan Unit Status   Amikacin Sensitive <=16 mcg/mL Final   Cefepime Sensitive <=8 mcg/mL Final   Ceftazidime Sensitive 4 mcg/mL Final   Ciprofloxacin Sensitive <=1 mcg/mL Final   Gentamicin Sensitive <=4 mcg/mL Final   Imipenem Sensitive <=1 mcg/mL Final   Meropenem Sensitive <=1 mcg/mL Final   Pip/Tazobactam Sensitive <=16 mcg/mL Final   Tobramycin Sensitive <=4  mcg/mL Final                   Assessment:  Active Hospital Problems    Diagnosis   • Infected ulcer of skin (HCC) [L98.499, L08.9]   • Hyperlipidemia [E78.5]   • Chronic hepatitis C without hepatic coma (HCC) [B18.2]   • Anxiety [F41.9]   • Stage 3 chronic kidney disease (HCC) [N18.3]   • HTN (hypertension) [I10]   • Tobacco use [Z72.0]       Plan:    Infected ulcer of leg   Infected venous stasis ulcer  Afebrile  No leukocytosis  Wound culture grows  Pseudomonas, MRSA, E faecalis and Klebsiella   Continue IV Zosyn and zyvox for now  Wound vac placed  LPS consulting.  -can change to PO quinolone at discharge with Zyvox  Stop date 1/2/2019    Chronic hepatitis C without hepatic coma   In process of workup from GI Dr Park -   MRI abdomen with and without and CT chest with ordered as patient missed these appointments for admission   FU GI for treatment    Lung nodule  Concern for underlying malignancy.  Biopsy if feasible  PET scan planned      Chronic kidney disease   Maintaining hydration  Avoiding nephrotoxics: NSAIDs etc

## 2018-12-23 NOTE — WOUND TEAM
[facility]  Inpatient Services  Initial Wound and Skin Care Evaluation    Admission Date: 12/18/2018     Consult Date: 12/20/2018 @ 1100   [facility], [facility], [facility]: Reviewed    Unit where seen by Wound Team: [id number]     WOUND CONSULT RELATED TO:  Placement of NPWT to LLE posterior     SUBJECTIVE:  Pt agreeable     Self Report / Pain Level:  denies       OBJECTIVE:  Patient lying supine in bed,vac in place to leg    WOUND TYPE, LOCATION, CHARACTERISTICS (Pressure Injuries: location, stage, POA or date identified)           Negative Pressure Wound Therapy Leg Posterior;Left (Active)   NPWT Pump Mode / Pressure Setting 125 mmHg 12/22/2018  6:00 PM   Dressing Type Custom foam;Small 12/22/2018  6:00 PM   Number of Foam Pieces Used 2 12/22/2018  6:00 PM   VAC VeraFlo Irrigant Normal Saline 12/22/2018  6:00 PM   VAC VeraFlo Soak Time (mins) 9 12/22/2018  6:00 PM   VAC VeraFlo Instill Volume (ml) 6 12/22/2018  6:00 PM   VAC VeraFlo - Therapy Time (hrs) 2.5 12/22/2018  6:00 PM   VAC VeraFlo Pressure (mm/Hg) 125 mmHg 12/22/2018  6:00 PM   WOUND NURSE ONLY - Time Spent with Patient (mins) 90 12/20/2018 11:00 AM     Wound Leg Left Posterior LE (Active)   Wound Image   12/20/2018 11:00 AM   Site Assessment Red 12/22/2018  6:00 PM   Marylou-wound Assessment Intact 12/22/2018  6:00 PM   Margins Attached edges;Defined edges 12/22/2018  6:00 PM   Wound Length (cm) 5.3 cm 12/20/2018 11:00 AM   Wound Width (cm) 2.7 cm 12/20/2018 11:00 AM   Wound Depth (cm) 0.2 cm 12/20/2018 11:00 AM   Wound Surface Area (cm^2) 14.31 cm^2 12/20/2018 11:00 AM   Tunneling 0 cm 12/20/2018 11:00 AM   Undermining 0 cm 12/20/2018 11:00 AM   Closure Secondary intention 12/22/2018  6:00 PM   Drainage Amount Small 12/22/2018  6:00 PM   Drainage Description Serosanguineous 12/22/2018  6:00 PM   Non-staged Wound Description Full thickness 12/22/2018  6:00 PM   Treatments Site care;Cleansed 12/22/2018  6:00 PM   Cleansing Approved Wound Cleanser 12/22/2018  6:00  PM   Periwound Protectant Skin Protectant wipes to Periwound 12/22/2018  6:00 PM   Dressing Options Wound Vac 12/22/2018  6:00 PM   Dressing Cleansing/Solutions Normal Saline 12/20/2018 11:00 AM   Dressing Changed Changed 12/22/2018  6:00 PM   Dressing Status Clean;Dry;Intact 12/22/2018  8:42 AM   Dressing Change Frequency Monday, Wednesday, Friday 12/22/2018  6:00 PM   NEXT Dressing Change  12/24/18 12/22/2018  6:00 PM   NEXT Weekly Photo (Inpatient Only) 12/27/18 12/20/2018 11:00 AM   WOUND NURSE ONLY - Odor None 12/22/2018  6:00 PM   WOUND NURSE ONLY - Pulses Left;1+;DP;PT 12/20/2018 11:00 AM   WOUND NURSE ONLY - Exposed Structures None 12/22/2018  6:00 PM   WOUND NURSE ONLY - Tissue Type and Percentage 90% red, 10% yellow 12/22/2018  6:00 PM   WOUND NURSE ONLY - Time Spent with Patient (mins) 60 12/22/2018  6:00 PM       Wound 12/12/18 Traumatic LLE anterior cluster (Active)          Lab Values:    WBC:       WBC   Date/Time Value Ref Range Status   12/22/2018 12:36 AM 5.7 4.8 - 10.8 K/uL Final     AIC:      Lab Results   Component Value Date/Time    HBA1C 5.6 11/10/2017 11:43 AM         Culture:   Obtained 12/13/2018, Results:    Component   Significant Indicator  (Positive)   POS (POS)    Source   WND    Site   Left Lower Extremity    Culture Result Wound  (Abnormal)   Gram Stain Result   Rare WBCs.   Moderate Gram negative rods.   Few Gram positive cocci.     Culture Result Wound  (Abnormal)      Pseudomonas aeruginosa   Heavy growth   P.aeruginosa may develop resistance during prolonged therapy   with all antibiotics. Isolates that are initially susceptible   may become resistant within three to four days after   initiation of therapy. Testing of repeat isolates may be   warranted.     Culture Result Wound  (Abnormal)      Klebsiella oxytoca   Heavy growth     Culture Result Wound  (Abnormal)      Methicillin Resistant Staphylococcus aureus   Heavy growth     Culture Result Wound  (Abnormal)       Enterococcus faecalis   Heavy growth   Combination therapy with ampicillin, penicillin, or   vancomycin (for susceptible strains) plus an aminoglycoside   is usually indicated for serious enterococcal infections,   such as endocarditis unless high-level resistance to both   gentamicin and streptomycin is documented; such combinations   are predicted to result in synergistic killing of the   Enterococcus.   The susceptibility profile for this organism indicates that   Gentamycin would not be an effective component of combination   therapy.          INTERVENTIONS BY WOUND TEAM:  Dressing removed, Wound cleansed with wound cleanser, no sting and drape to sandrine wound skin. Small piece of honeycomb foam onto wound bed, 1 piece of thin gray foam over top (2 pieces total). Trac pad applied, NPWT started at 125 mm Hg continuous, settings: 6 mL of saline for dwell time of 9 minutes, every 2 1/2 minutes. No leaks noted    Dressing selection:  NPWT         Interdisciplinary consultation: Patient, Bedside RN    EVALUATION: Patient with chronic venous stasis ulcer, been treated at wound clinic for approximately one year. Wound cultured last week, infected, see above. Per LPS provider Abeba ANGULO, wound team to place NPWT with veraflo cleanse choice starting 12/20. NPWT to help promote granular tissue, reduce bioburden and manage exudate.    Factors affecting wound healing: hx of tobacco abuse, infection    Goals: Steady decrease in wound area and depth weekly.    NURSING PLAN OF CARE ORDERS (X):    Dressing changes: See Dressing Care orders: X  Skin care: See Skin Care orders:   Rectal tube care: See Rectal Tube Care orders:   Other orders:      WOUND TEAM PLAN OF CARE (X):   NPWT change 3 x week: X       Dressing changes by wound team:       Follow up as needed:       Other (explain):     Anticipated discharge plans (X):   SNF:           Home Care:           Outpatient Wound Center: X           Self Care:             Other:  TBD, per patient he only has rides to the clinic on Wednesday's, may need Home Health if not able to arrange transportation.

## 2018-12-23 NOTE — PROGRESS NOTES
Infectious Disease Progress Note    Author: Mayte Fuller M.D. Date & Time of service: 2018  2:43 PM    Chief Complaint:  Infected leg ulcer    Interval History:  63 y.o. male admitted 2018 with non-healing leg ulcer and found to have polymicrobial wound   AF WBC 5.6 tolerating abx well-VAC placed   AF tolerating abx well-no complaints   AF WBC 5.9 no new complaints  Labs Reviewed, Medications Reviewed, Radiology Reviewed and Wound Reviewed.    Review of Systems:  Review of Systems   Constitutional: Negative for chills and fever.   Cardiovascular: Negative for chest pain.   Gastrointestinal: Negative for abdominal pain, nausea and vomiting.   Skin: Negative for rash.   All other systems reviewed and are negative.      Hemodynamics:  Temp (24hrs), Av.8 °C (98.2 °F), Min:36.7 °C (98 °F), Max:36.9 °C (98.5 °F)  Temperature: 36.8 °C (98.2 °F)  Pulse  Av.5  Min: 46  Max: 95  Blood Pressure: 144/97       Physical Exam:  Physical Exam   Constitutional: He is oriented to person, place, and time. He appears well-developed. No distress.   disheveled   HENT:   Head: Normocephalic and atraumatic.   Eyes: Pupils are equal, round, and reactive to light. EOM are normal. No scleral icterus.   Neck: Neck supple.   Cardiovascular: Normal rate.    No murmur heard.  Pulmonary/Chest: Breath sounds normal. No respiratory distress. He has no wheezes.   Abdominal: Soft. There is no rebound and no guarding.   Musculoskeletal: He exhibits no edema.   VAC LLE   Neurological: He is alert and oriented to person, place, and time.   Skin: He is not diaphoretic.   tattoos   Nursing note and vitals reviewed.      Meds:    Current Facility-Administered Medications:   •  oxyCODONE immediate-release **OR** oxyCODONE immediate-release  •  piperacillin-tazobactam **AND** [DISCONTINUED] MD Alert...Vancomycin per Pharmacy  •  heparin  •  senna-docusate **AND** polyethylene glycol/lytes **AND** magnesium hydroxide  **AND** bisacodyl  •  Respiratory Care per Protocol  •  NS  •  acetaminophen  •  ondansetron  •  ondansetron  •  promethazine  •  promethazine  •  prochlorperazine  •  nicotine **AND** Nicotine Replacement Patient Education Materials **AND** nicotine polacrilex  •  linezolid  •  amLODIPine  •  propranolol    Labs:  Recent Labs      12/21/18   1051  12/22/18   0036  12/23/18   0129   WBC  5.6  5.7  5.9   RBC  4.48*  4.09*  4.29*   HEMOGLOBIN  14.0  12.7*  13.1*   HEMATOCRIT  43.2  39.6*  41.8*   MCV  96.4  96.8  97.4   MCH  31.3  31.1  30.5   RDW  57.5*  58.4*  57.7*   PLATELETCT  232  234  235   MPV  9.0  9.2  9.3   NEUTSPOLYS  73.00*  55.30  56.80   LYMPHOCYTES  17.40*  30.00  29.20   MONOCYTES  5.70  9.00  7.60   EOSINOPHILS  2.80  4.60  5.60   BASOPHILS  0.70  0.90  0.80     Recent Labs      12/22/18   0036  12/23/18   0129   SODIUM  140  136   POTASSIUM  4.5  4.6   CHLORIDE  110  108   CO2  21  22   GLUCOSE  82  86   BUN  29*  28*     Recent Labs      12/22/18   0036  12/23/18   0129   CREATININE  1.34  1.35       Imaging:  Ct-chest (thorax) With    Result Date: 12/20/2018 12/20/2018 7:01 PM HISTORY/REASON FOR EXAM: Smoker. Evaluate for malignancy. 30 pack-year smoking history. Spiculated nodule on prior plain film TECHNIQUE/EXAM DESCRIPTION: CT scan of the chest with contrast. Helical images were obtained from the lung apices through the adrenal glands following the bolus administration of  70 mL of Omnipaque 350 nonionic contrast. Sagittal and coronal reconstructions were performed. Low dose optimization technique was utilized for this CT exam including automated exposure control and adjustment of the mA and/or kV according to patient size. COMPARISON:  Plain film of the chest 12/5/2018. FINDINGS: Atherosclerotic plaque is seen in the aorta. Ascending aorta measures 4 cm in diameter. The heart is enlarged. No pericardial or pleural effusion is identified. No filling defects are seen within the large central  pulmonary arteries. There are small mediastinal lymph nodes. There is no hilar lymphadenopathy. There are multiple small axillary lymph nodes bilaterally. There is mild left apical scarring. There is an ill-defined opacity at the in the right upper lobe with adjacent linear opacity. The nodular component measures 1 x 0.95 cm. There is bibasilar atelectasis. No pneumothorax is identified. Central airways are patent. Limited views were obtained of the upper abdomen. There is renal cortical scarring involving the kidneys bilaterally. Mild degenerative changes are seen in the spine. There are postsurgical and old posttraumatic changes of the proximal left humerus.     Ill-defined right upper lobe nodular opacity with surrounding linear opacities. This could be related to scarring or malignancy. Further evaluation with PET/CT is recommended. Bibasilar atelectasis. Cardiomegaly. Bilateral renal cortical scarring. Dilated ascending aorta measuring 4 cm.    Dx-chest-2 Views    Result Date: 12/5/2018 12/5/2018 9:09 AM HISTORY/REASON FOR EXAM:  Abnormal ultrasound.  Smoking history.  Hepatitis C and anemia. TECHNIQUE/EXAM DESCRIPTION AND NUMBER OF VIEWS: Two views of the chest. COMPARISON:  None available. FINDINGS: Cardiomediastinal contour is within normal limits. Nodular densities project over both lower lungs, likely nipple shadows. Small apparent spiculated nodular density projects over the RIGHT lung apex measuring approximately 9 mm.  Not demonstrated on lateral view. No pleural fluid collection or pneumothorax. No major bony abnormality is seen.     1.  No pneumonia or pneumothorax. 2.  Possible spiculated nodule at the RIGHT lung apex measuring 9 mm.  Recommend further evaluation with CT chest. 3.  Probable bibasilar nipple shadows.    Dx-foot-complete 3+ Left    Result Date: 12/19/2018 12/19/2018 12:52 AM HISTORY/REASON FOR EXAM:  Left foot pain without trauma TECHNIQUE/EXAM DESCRIPTION:  AP, lateral, and oblique  "views of the LEFT foot. COMPARISON:  None. FINDINGS: No acute fracture is seen. There is severe pes planus deformity identified with bony remodeling of the midfoot bony structures.     1.  No acute traumatic bony injury. 2.  Severe pes planus deformity with remodeling of the midfoot bony structures. Appearance suggests Charcot joints.    Dx-tibia And Fibula Left    Result Date: 12/19/2018 12/19/2018 12:52 AM HISTORY/REASON FOR EXAM:  Atraumatic leg pain. TECHNIQUE/EXAM DESCRIPTION:  AP and lateral views of the LEFT tibia and fibula. COMPARISON:  None. FINDINGS: There is no visualized acute fracture. There is severe pes planus deformity identified with extensive degenerative changes and bony remodeling of the midfoot.     1.  No acute traumatic bony injury. 2.  Severe pes planus deformity with extensive degenerative changes and bony remodeling of the midfoot.      Micro:  Results     Procedure Component Value Units Date/Time    BLOOD CULTURE x2 [[id number]] Collected:  12/19/18 0006    Order Status:  Completed Specimen:  Blood from Peripheral Updated:  12/20/18 0823     Significant Indicator NEG     Source BLD     Site PERIPHERAL     Blood Culture No Growth    Note: Blood cultures are incubated for 5 days and  are monitored continuously.Positive blood cultures  are called to the RN and reported as soon as  they are identified.      Narrative:       Per Hospital Policy: Only change Specimen Src: to \"Line\" if  specified by physician order.    BLOOD CULTURE x2 [[id number]] Collected:  12/19/18 0031    Order Status:  Completed Specimen:  Blood from Peripheral Updated:  12/20/18 0823     Significant Indicator NEG     Source BLD     Site PERIPHERAL     Blood Culture No Growth    Note: Blood cultures are incubated for 5 days and  are monitored continuously.Positive blood cultures  are called to the RN and reported as soon as  they are identified.      Narrative:       Per Hospital Policy: Only change Specimen Src: to \"Line\" " if  specified by physician order.      Culture & Susceptibility     ENTEROCOCCUS FAECALIS     Antibiotic Sensitivity Microscan Unit Status   Ampicillin Sensitive <=2 mcg/mL Final   Daptomycin Sensitive 2 mcg/mL Final   Gent Synergy Resistant >500 mcg/mL Final   Penicillin Sensitive 2 mcg/mL Final   Strep Synergy Sensitive <=1000 mcg/mL Final   Vancomycin Sensitive 2 mcg/mL Final         KLEBSIELLA OXYTOCA     Antibiotic Sensitivity Microscan Unit Status   Ampicillin Intermediate 16 mcg/mL Final   Cefepime Sensitive <=8 mcg/mL Final   Cefotaxime Sensitive <=2 mcg/mL Final   Cefotetan Sensitive <=16 mcg/mL Final   Ceftazidime Sensitive <=1 mcg/mL Final   Ceftriaxone Sensitive <=8 mcg/mL Final   Cefuroxime Sensitive <=4 mcg/mL Final   Ciprofloxacin Sensitive <=1 mcg/mL Final   Ertapenem Sensitive <=1 mcg/mL Final   Gentamicin Sensitive <=4 mcg/mL Final   Pip/Tazobactam Sensitive <=16 mcg/mL Final   Tigecycline Sensitive <=2 mcg/mL Final   Tobramycin Sensitive <=4 mcg/mL Final   Trimeth/Sulfa Sensitive <=2/38 mcg/mL Final         METHICILLIN RESISTANT STAPHYLOCOCCUS AUREUS     Antibiotic Sensitivity Microscan Unit Status   Ampicillin/sulbactam Resistant 16/8 mcg/mL Final   Clindamycin Sensitive <=0.5 mcg/mL Final   Daptomycin Sensitive <=0.5 mcg/mL Final   Erythromycin Resistant >4 mcg/mL Final   Moxifloxacin Sensitive 1 mcg/mL Final   Oxacillin Resistant >2 mcg/mL Final   Penicillin Resistant >8 mcg/mL Final   Tetracycline Sensitive <=4 mcg/mL Final   Trimeth/Sulfa Sensitive <=0.5/9.5 mcg/mL Final   Vancomycin Sensitive 1 mcg/mL Final         PSEUDOMONAS AERUGINOSA     Antibiotic Sensitivity Microscan Unit Status   Amikacin Sensitive <=16 mcg/mL Final   Cefepime Sensitive <=8 mcg/mL Final   Ceftazidime Sensitive 4 mcg/mL Final   Ciprofloxacin Sensitive <=1 mcg/mL Final   Gentamicin Sensitive <=4 mcg/mL Final   Imipenem Sensitive <=1 mcg/mL Final   Meropenem Sensitive <=1 mcg/mL Final   Pip/Tazobactam Sensitive <=16  mcg/mL Final   Tobramycin Sensitive <=4 mcg/mL Final                   Assessment:  Active Hospital Problems    Diagnosis   • Infected ulcer of skin (HCC) [L98.499, L08.9]   • Hyperlipidemia [E78.5]   • Chronic hepatitis C without hepatic coma (HCC) [B18.2]   • Anxiety [F41.9]   • Stage 3 chronic kidney disease (HCC) [N18.3]   • HTN (hypertension) [I10]   • Tobacco use [Z72.0]       Plan:  Infected ulcer of leg   Infected venous stasis ulcer  Afebrile  No leukocytosis  Wound culture grows  Pseudomonas, MRSA, E faecalis and Klebsiella  C ontinue IV Zosyn and zyvox for now  Wound vac placed  LPS consulting.  -can change to PO quinolone at discharge with Zyvox  Stop date 1/2/2019    Chronic hepatitis C without hepatic coma   In process of workup from GI Dr Park -   MRI abdomen with and without and CT chest with ordered as patient missed these appointments for admission   FU GI for treatment    Lung nodule  Concern for underlying malignancy.  Biopsy if feasible  PET scan planned      Chronic kidney disease   Maintaining hydration  Avoiding nephrotoxics: NSAIDs etc

## 2018-12-23 NOTE — PROGRESS NOTES
Rec'd report & assumed care of pt at 0700. Assessment completed. Pt is A&Ox4, up SBA, resting comfortably in bed. Contact precautions in place for MRSA. R PIV patent, running NS @ 75 mL/hr. Wound vac in place to L lower leg Pt reports pain of 5/10, medication provided per MAR. POC discussed & questions answered. Bed locked and in lowest position, call light within reach, non-skid socks in place, hourly rounding. Pt reports no further needs at this time, will continue to monitor.

## 2018-12-23 NOTE — PROGRESS NOTES
[facility] Medicine Daily Progress Note    Date of Service  12/23/2018    Chief Complaint  63 y.o. male admitted 12/18/2018 with non-healing leg ulcer and found to have polymicrobial wound culture and told to present to ED.    Hospital Course    Patient admitted and started on Vanco and zosyn.  LPS consulted and reviewed with surgery, no debridement planned at this time.  Wound vac in place and ID consulted for recommendations.      Interval Problem Update  12/20 Patient states he feels no pain and is doing okay.  He is concerned that he missed the appointments for imaging ordered by his GI physician, Dr Park for evaluation for concern of hepatocellular carcinoma given his history of hepatitis C.  As patient is a high risk for cancer, MRI and CT ordered inpatient for further evaluation.  12/21 Patient feeling well.  With wound vac in place, he cannot have MRI at this time, already received contrast for CT chest so will not have CT abdomen/pelvis with contrast at this time.  CT chest does show a mass in the RUL but it has been watched for years and is continuing to decrease in size and appearance is not typical of a malignancy.  Will order AFP for blood work today to further evaluate for HCC.  Coordination for wound vac on discharge in process as this is anticipated to take many days to do so.  12/22 Patient without significant changes overnight, states he feels well and is tolerating the infusions without issue.  His appetite is good along and normal urination and bowel movements.  12/23 Patient states he is feeling well, good appetite.  He is please to hear his A1C is not diabetic and the tumor marker for hepatocellular carcinoma is normal.  Patient to remain in hospital until wound vac can be coordinated for discharge.    Consultants/Specialty  none    Code Status  full    Disposition  Home once wound vac coordinated.    Review of Systems  Review of Systems   Constitutional: Negative for chills and fever.   HENT:  Negative for congestion and sore throat.    Eyes: Negative for blurred vision and photophobia.   Respiratory: Negative for cough and shortness of breath.    Cardiovascular: Negative for chest pain, claudication and leg swelling.   Gastrointestinal: Negative for abdominal pain, constipation, diarrhea, heartburn, nausea and vomiting.   Genitourinary: Negative for dysuria and hematuria.   Musculoskeletal: Negative for joint pain and myalgias.   Skin: Negative for itching and rash.        Non-healing ulcer L calf   Neurological: Negative for dizziness, sensory change, speech change, weakness and headaches.   Psychiatric/Behavioral: Negative for depression. The patient is not nervous/anxious and does not have insomnia.         Physical Exam  Temp:  [36.7 °C (98 °F)-36.9 °C (98.5 °F)] 36.8 °C (98.2 °F)  Pulse:  [48-95] 48  Resp:  [16-18] 17  BP: (139-173)/(76-97) 144/97    Physical Exam   Constitutional: He is oriented to person, place, and time. He appears well-developed and well-nourished. No distress.   HENT:   Head: Normocephalic and atraumatic.   Eyes: Conjunctivae are normal. No scleral icterus.   Neck: Neck supple. No JVD present.   Cardiovascular: Normal rate, regular rhythm and normal heart sounds.  Exam reveals no gallop and no friction rub.    No murmur heard.  Pulmonary/Chest: Effort normal and breath sounds normal. No respiratory distress. He has no wheezes. He exhibits no tenderness.   Abdominal: Soft. Bowel sounds are normal. He exhibits no distension and no mass. There is no tenderness.   Musculoskeletal: He exhibits no edema or tenderness.   Lymphadenopathy:     He has no cervical adenopathy.   Neurological: He is alert and oriented to person, place, and time. No cranial nerve deficit.   Skin: Skin is warm and dry. He is not diaphoretic. No erythema. No pallor.   Wound vac in place LLE over ulcer     Psychiatric: He has a normal mood and affect. His behavior is normal.   Nursing note and vitals  reviewed.      Fluids    Intake/Output Summary (Last 24 hours) at 12/23/18 1222  Last data filed at 12/23/18 0905   Gross per 24 hour   Intake             2389 ml   Output              750 ml   Net             1639 ml       Laboratory  Recent Labs      12/21/18   1051  12/22/18   0036  12/23/18   0129   WBC  5.6  5.7  5.9   RBC  4.48*  4.09*  4.29*   HEMOGLOBIN  14.0  12.7*  13.1*   HEMATOCRIT  43.2  39.6*  41.8*   MCV  96.4  96.8  97.4   MCH  31.3  31.1  30.5   MCHC  32.4*  32.1*  31.3*   RDW  57.5*  58.4*  57.7*   PLATELETCT  232  234  235   MPV  9.0  9.2  9.3     Recent Labs      12/22/18   0036  12/23/18   0129   SODIUM  140  136   POTASSIUM  4.5  4.6   CHLORIDE  110  108   CO2  21  22   GLUCOSE  82  86   BUN  29*  28*   CREATININE  1.34  1.35   CALCIUM  8.9  9.1                   Imaging  CT-CHEST (THORAX) WITH   Final Result      Ill-defined right upper lobe nodular opacity with surrounding linear opacities. This could be related to scarring or malignancy. Further evaluation with PET/CT is recommended.      Bibasilar atelectasis.      Cardiomegaly.      Bilateral renal cortical scarring.      Dilated ascending aorta measuring 4 cm.      DX-FOOT-COMPLETE 3+ LEFT   Final Result         1.  No acute traumatic bony injury.   2.  Severe pes planus deformity with remodeling of the midfoot bony structures. Appearance suggests Charcot joints.      DX-TIBIA AND FIBULA LEFT   Final Result         1.  No acute traumatic bony injury.   2.  Severe pes planus deformity with extensive degenerative changes and bony remodeling of the midfoot.      MR-ABDOMEN-WITH & W/O    (Results Pending)        Assessment/Plan  Infected ulcer of skin (HCC)- (present on admission)   Assessment & Plan    Infected left lower extremity venous stasis ulcer  Wound culture grows  Pseudomonas, MRSA, E faecalis and Klebsiella   IV vancomycin and IV Zosyn started on admission  ID consulted - continue zosyn - dc vanco and start zyvox.  Wound vac  placed, outpatient wound vac being coordinated.  LPS consulting.  ABX through 1/2/19 - quinolone can be substituted for zosyn on dc per ID           Hyperlipidemia- (present on admission)   Assessment & Plan    Continue atorvastatin 40     Chronic hepatitis C without hepatic coma (HCC)- (present on admission)   Assessment & Plan    In process of workup from GI Dr Park -CT chest completed showing : right upper lobe nodular opacity with surrounding linear opacities, further evaluation with PET recommended vs serial CT Scan    MRI abdomen postponed as not compatible with wound vac, CT abdomen/pelvis on hold given recent contrast with CT chest.  AFP ordered.         Anxiety- (present on admission)   Assessment & Plan    Continue Celexa     Stage 3 chronic kidney disease (HCC)- (present on admission)   Assessment & Plan    Maintaining hydration  Avoiding nephrotoxics: NSAIDs etc  Following BMP       HTN (hypertension)- (present on admission)   Assessment & Plan    Continue Norvasc and Inderal     Tobacco use- (present on admission)   Assessment & Plan    Nicotine replacement has been provided          VTE prophylaxis: heparin as no surgery planned
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
## 2018-12-24 LAB
ANION GAP SERPL CALC-SCNC: 7 MMOL/L (ref 0–11.9)
BACTERIA BLD CULT: NORMAL
BACTERIA BLD CULT: NORMAL
BASOPHILS # BLD AUTO: 0.9 % (ref 0–1.8)
BASOPHILS # BLD: 0.06 K/UL (ref 0–0.12)
BUN SERPL-MCNC: 27 MG/DL (ref 8–22)
CALCIUM SERPL-MCNC: 9 MG/DL (ref 8.5–10.5)
CHLORIDE SERPL-SCNC: 109 MMOL/L (ref 96–112)
CO2 SERPL-SCNC: 20 MMOL/L (ref 20–33)
CREAT SERPL-MCNC: 1.24 MG/DL (ref 0.5–1.4)
EOSINOPHIL # BLD AUTO: 0.33 K/UL (ref 0–0.51)
EOSINOPHIL NFR BLD: 4.9 % (ref 0–6.9)
ERYTHROCYTE [DISTWIDTH] IN BLOOD BY AUTOMATED COUNT: 57.1 FL (ref 35.9–50)
GLUCOSE SERPL-MCNC: 95 MG/DL (ref 65–99)
HCT VFR BLD AUTO: 42.5 % (ref 42–52)
HGB BLD-MCNC: 13.6 G/DL (ref 14–18)
IMM GRANULOCYTES # BLD AUTO: 0.01 K/UL (ref 0–0.11)
IMM GRANULOCYTES NFR BLD AUTO: 0.1 % (ref 0–0.9)
LYMPHOCYTES # BLD AUTO: 1.5 K/UL (ref 1–4.8)
LYMPHOCYTES NFR BLD: 22.2 % (ref 22–41)
MCH RBC QN AUTO: 31.2 PG (ref 27–33)
MCHC RBC AUTO-ENTMCNC: 32 G/DL (ref 33.7–35.3)
MCV RBC AUTO: 97.5 FL (ref 81.4–97.8)
MONOCYTES # BLD AUTO: 0.56 K/UL (ref 0–0.85)
MONOCYTES NFR BLD AUTO: 8.3 % (ref 0–13.4)
NEUTROPHILS # BLD AUTO: 4.29 K/UL (ref 1.82–7.42)
NEUTROPHILS NFR BLD: 63.6 % (ref 44–72)
NRBC # BLD AUTO: 0 K/UL
NRBC BLD-RTO: 0 /100 WBC
PLATELET # BLD AUTO: 222 K/UL (ref 164–446)
PMV BLD AUTO: 9.3 FL (ref 9–12.9)
POTASSIUM SERPL-SCNC: 4.4 MMOL/L (ref 3.6–5.5)
RBC # BLD AUTO: 4.36 M/UL (ref 4.7–6.1)
SIGNIFICANT IND 70042: NORMAL
SIGNIFICANT IND 70042: NORMAL
SITE SITE: NORMAL
SITE SITE: NORMAL
SODIUM SERPL-SCNC: 136 MMOL/L (ref 135–145)
SOURCE SOURCE: NORMAL
SOURCE SOURCE: NORMAL
WBC # BLD AUTO: 6.8 K/UL (ref 4.8–10.8)

## 2018-12-24 PROCEDURE — 700102 HCHG RX REV CODE 250 W/ 637 OVERRIDE(OP): Performed by: INTERNAL MEDICINE

## 2018-12-24 PROCEDURE — 700111 HCHG RX REV CODE 636 W/ 250 OVERRIDE (IP): Performed by: INTERNAL MEDICINE

## 2018-12-24 PROCEDURE — 36415 COLL VENOUS BLD VENIPUNCTURE: CPT

## 2018-12-24 PROCEDURE — 770021 HCHG ROOM/CARE - ISO PRIVATE

## 2018-12-24 PROCEDURE — A9270 NON-COVERED ITEM OR SERVICE: HCPCS | Performed by: INTERNAL MEDICINE

## 2018-12-24 PROCEDURE — 80048 BASIC METABOLIC PNL TOTAL CA: CPT

## 2018-12-24 PROCEDURE — 99233 SBSQ HOSP IP/OBS HIGH 50: CPT | Performed by: INTERNAL MEDICINE

## 2018-12-24 PROCEDURE — 700105 HCHG RX REV CODE 258: Performed by: INTERNAL MEDICINE

## 2018-12-24 PROCEDURE — 97605 NEG PRS WND THER DME<=50SQCM: CPT

## 2018-12-24 PROCEDURE — 85025 COMPLETE CBC W/AUTO DIFF WBC: CPT

## 2018-12-24 PROCEDURE — 99232 SBSQ HOSP IP/OBS MODERATE 35: CPT | Performed by: INTERNAL MEDICINE

## 2018-12-24 RX ADMIN — OXYCODONE HYDROCHLORIDE 10 MG: 10 TABLET ORAL at 17:12

## 2018-12-24 RX ADMIN — LINEZOLID 600 MG: 600 TABLET, FILM COATED ORAL at 17:12

## 2018-12-24 RX ADMIN — HEPARIN SODIUM 5000 UNITS: 5000 INJECTION, SOLUTION INTRAVENOUS; SUBCUTANEOUS at 05:14

## 2018-12-24 RX ADMIN — PIPERACILLIN AND TAZOBACTAM 4.5 G: 4; .5 INJECTION, POWDER, LYOPHILIZED, FOR SOLUTION INTRAVENOUS; PARENTERAL at 13:30

## 2018-12-24 RX ADMIN — HEPARIN SODIUM 5000 UNITS: 5000 INJECTION, SOLUTION INTRAVENOUS; SUBCUTANEOUS at 21:35

## 2018-12-24 RX ADMIN — NICOTINE 14 MG: 14 PATCH, EXTENDED RELEASE TRANSDERMAL at 05:15

## 2018-12-24 RX ADMIN — OXYCODONE HYDROCHLORIDE 10 MG: 10 TABLET ORAL at 10:50

## 2018-12-24 RX ADMIN — OXYCODONE HYDROCHLORIDE 10 MG: 10 TABLET ORAL at 05:14

## 2018-12-24 RX ADMIN — HEPARIN SODIUM 5000 UNITS: 5000 INJECTION, SOLUTION INTRAVENOUS; SUBCUTANEOUS at 13:32

## 2018-12-24 RX ADMIN — PIPERACILLIN AND TAZOBACTAM 4.5 G: 4; .5 INJECTION, POWDER, LYOPHILIZED, FOR SOLUTION INTRAVENOUS; PARENTERAL at 05:13

## 2018-12-24 RX ADMIN — PIPERACILLIN AND TAZOBACTAM 4.5 G: 4; .5 INJECTION, POWDER, LYOPHILIZED, FOR SOLUTION INTRAVENOUS; PARENTERAL at 21:34

## 2018-12-24 RX ADMIN — HYDRALAZINE HYDROCHLORIDE 20 MG: 20 INJECTION INTRAMUSCULAR; INTRAVENOUS at 21:36

## 2018-12-24 RX ADMIN — LINEZOLID 600 MG: 600 TABLET, FILM COATED ORAL at 05:14

## 2018-12-24 RX ADMIN — OXYCODONE HYDROCHLORIDE 10 MG: 10 TABLET ORAL at 21:35

## 2018-12-24 RX ADMIN — PROPRANOLOL HYDROCHLORIDE 10 MG: 10 TABLET ORAL at 17:12

## 2018-12-24 RX ADMIN — PROPRANOLOL HYDROCHLORIDE 10 MG: 10 TABLET ORAL at 05:14

## 2018-12-24 RX ADMIN — AMLODIPINE BESYLATE 5 MG: 5 TABLET ORAL at 05:14

## 2018-12-24 ASSESSMENT — ENCOUNTER SYMPTOMS
ABDOMINAL PAIN: 0
DIARRHEA: 1
DIZZINESS: 0
SPEECH CHANGE: 0
VOMITING: 0
HEARTBURN: 0
NAUSEA: 0
CONSTIPATION: 0
HEADACHES: 0
CLAUDICATION: 0
NERVOUS/ANXIOUS: 0
SENSORY CHANGE: 0
DEPRESSION: 0
SORE THROAT: 0
FEVER: 0
WEAKNESS: 0
MYALGIAS: 0
DIARRHEA: 0
COUGH: 0
CHILLS: 0
INSOMNIA: 0
PHOTOPHOBIA: 0
SHORTNESS OF BREATH: 0
BLURRED VISION: 0

## 2018-12-24 ASSESSMENT — PAIN SCALES - GENERAL
PAINLEVEL_OUTOF10: 4
PAINLEVEL_OUTOF10: 2
PAINLEVEL_OUTOF10: 1
PAINLEVEL_OUTOF10: 2
PAINLEVEL_OUTOF10: 4
PAINLEVEL_OUTOF10: 2

## 2018-12-24 NOTE — CARE PLAN
Problem: Infection  Goal: Will remain free from infection  Outcome: PROGRESSING AS EXPECTED  IV abx. Contact precautions for MRSA in wound.     Problem: Bowel/Gastric:  Goal: Normal bowel function is maintained or improved  Outcome: PROGRESSING SLOWER THAN EXPECTED  Pt complains about diarrhea yesterday; denies any today. MD aware.     Problem: Pain Management  Goal: Pain level will decrease to patient's comfort goal  Outcome: PROGRESSING AS EXPECTED  Pt rates pain 2/10. Roxicodone 10 mg given by previous RN.

## 2018-12-24 NOTE — PROGRESS NOTES
Assumed care of pt @0700. Bedside report received. Pt AOX 4. Pt rates pain 2/10. Roxicodone 10 mg given by previous RN. Contact precautions for MRSA in wound. Wound vac in place to LLE. PIV running IV abx/ NS at 75 ml/hr. Pt complains about having diarrhea yesterday( no diarrhea today). MD Hemphill aware. Pt up with standby assist. Fall precaution in place. POC discussed with pt, all questions answered at this time. Pt makes needs known, call light within reach, hourly rounding in place.

## 2018-12-24 NOTE — WOUND TEAM
[facility]  Inpatient Services   Wound and Skin Care     Admission Date: 12/18/2018     Consult Date: 12/20/2018 @ 1100   HPI, PMH, SH: Reviewed    Unit where seen by Wound Team: [id number]     WOUND CONSULT RELATED TO:  Placement of NPWT to LLE posterior     SUBJECTIVE:  Pt agreeable     Self Report / Pain Level:  denies       OBJECTIVE:  Patient lying supine in bed,vac in place to leg    WOUND TYPE, LOCATION, CHARACTERISTICS (Pressure Injuries: location, stage, POA or date identified)        Negative Pressure Wound Therapy Leg Posterior;Left (Active)   NPWT Pump Mode / Pressure Setting 125 mmHg 12/24/2018  6:00 AM   Dressing Type Custom foam 12/24/2018  6:00 AM   Number of Foam Pieces Used 2 12/23/2018  7:00 AM   VAC VeraFlo Irrigant Normal Saline 12/24/2018  6:00 AM   VAC VeraFlo Soak Time (mins) 9 12/24/2018  6:00 AM   VAC VeraFlo Instill Volume (ml) 6 12/24/2018  6:00 AM   VAC VeraFlo - Therapy Time (hrs) 2.5 12/24/2018  6:00 AM   VAC VeraFlo Pressure (mm/Hg) 125 mmHg 12/24/2018  6:00 AM   WOUND NURSE ONLY - Time Spent with Patient (mins) 90 12/20/2018 11:00 AM     Wound Leg Left Posterior LE (Active)   Wound Image   12/20/2018 11:00 AM   Site Assessment Red with some intermixed scar tissue 12/24/2018  6:00 AM   Marylou-wound Assessment Intact 12/24/2018  6:00 AM   Margins Attached edges;Defined edges 12/24/2018  6:00 AM   Wound Length (cm) 5.3 cm 12/20/2018 11:00 AM   Wound Width (cm) 2.7 cm 12/20/2018 11:00 AM   Wound Depth (cm) 0.2 cm 12/20/2018 11:00 AM   Wound Surface Area (cm^2) 14.31 cm^2 12/20/2018 11:00 AM   Tunneling 0 cm 12/20/2018 11:00 AM   Undermining 0 cm 12/20/2018 11:00 AM   Closure Secondary intention 12/24/2018  6:00 AM   Drainage Amount Small 12/24/2018  6:00 AM   Drainage Description Serosanguineous 12/24/2018  6:00 AM   Non-staged Wound Description Full thickness 12/24/2018  6:00 AM   Treatments Site care;Cleansed 12/22/2018  6:00 PM   Cleansing Approved Wound Cleanser 12/24/2018   6:00 AM   Periwound Protectant Skin Protectant wipes to Periwound 12/24/2018  6:00 AM   Dressing Options Wound Vac 12/24/2018  6:00 AM   Dressing Cleansing/Solutions Normal Saline 12/23/2018  8:00 PM   Dressing Changed Changed 12/22/2018  6:00 PM   Dressing Status Clean;Dry;Intact 12/23/2018  8:00 PM   Dressing Change Frequency Monday, Wednesday, Friday 12/24/2018  6:00 AM   NEXT Dressing Change  12/26/18 12/24/2018  6:00 AM   NEXT Weekly Photo (Inpatient Only) 12/27/18 12/24/2018  6:00 AM   WOUND NURSE ONLY - Odor None 12/24/2018  6:00 AM   WOUND NURSE ONLY - Pulses Left;1+;DP;PT 12/20/2018 11:00 AM   WOUND NURSE ONLY - Exposed Structures None 12/24/2018  6:00 AM   WOUND NURSE ONLY - Tissue Type and Percentage 100% red 12/24/2018  6:00 AM   WOUND NURSE ONLY - Time Spent with Patient (mins) 60 12/24/2018  6:00 AM       Wound 12/12/18 Traumatic LLE anterior cluster (Active)               Lab Values:    WBC:       WBC   Date/Time Value Ref Range Status   12/24/2018 12:26 AM 6.8 4.8 - 10.8 K/uL Final     AIC:      Lab Results   Component Value Date/Time    HBA1C 5.5 12/22/2018 12:36 AM         Culture:   Obtained 12/13/2018, Results:    Component   Significant Indicator  (Positive)   POS (POS)    Source   WND    Site   Left Lower Extremity    Culture Result Wound  (Abnormal)   Gram Stain Result   Rare WBCs.   Moderate Gram negative rods.   Few Gram positive cocci.     Culture Result Wound  (Abnormal)      Pseudomonas aeruginosa   Heavy growth   P.aeruginosa may develop resistance during prolonged therapy   with all antibiotics. Isolates that are initially susceptible   may become resistant within three to four days after   initiation of therapy. Testing of repeat isolates may be   warranted.     Culture Result Wound  (Abnormal)      Klebsiella oxytoca   Heavy growth     Culture Result Wound  (Abnormal)      Methicillin Resistant Staphylococcus aureus   Heavy growth     Culture Result Wound  (Abnormal)       Enterococcus faecalis   Heavy growth   Combination therapy with ampicillin, penicillin, or   vancomycin (for susceptible strains) plus an aminoglycoside   is usually indicated for serious enterococcal infections,   such as endocarditis unless high-level resistance to both   gentamicin and streptomycin is documented; such combinations   are predicted to result in synergistic killing of the   Enterococcus.   The susceptibility profile for this organism indicates that   Gentamycin would not be an effective component of combination   therapy.          INTERVENTIONS BY WOUND TEAM:  Dressing removed, Wound cleansed with wound cleanser, no sting and drape to sandrine wound skin, 1 piece of vera bassem to wound bed, 1 foam button under Trac pad, NPWT started at 125 mm Hg continuous, settings: 6 mL of saline for dwell time of 9 minutes, every 2 1/2 minutes. No leaks noted    Dressing selection:  NPWT         Interdisciplinary consultation: Patient, Bedside RN    EVALUATION: Patient with chronic venous stasis ulcer, been treated at wound clinic for approximately one year. Wound cultured last week, infected, see above. Per LPS provider Abeba ANGULO, wound team to place NPWT with veraflo cleanse choice starting 12/20. NPWT to help promote granular tissue, reduce bioburden and manage exudate.  Changed to regular veraflo due to clean wound bed.  Anticipate slow closure due to presence of scar tissue in wound bed.    Factors affecting wound healing: hx of tobacco abuse, infection    Goals: Steady decrease in wound area and depth weekly.    NURSING PLAN OF CARE ORDERS (X):    Dressing changes: See Dressing Care orders: X  Skin care: See Skin Care orders:   Rectal tube care: See Rectal Tube Care orders:   Other orders:      WOUND TEAM PLAN OF CARE (X):   NPWT change 3 x week: X       Dressing changes by wound team:       Follow up as needed:       Other (explain):     Anticipated discharge plans (X):   SNF:           Home  Care:           Outpatient Wound Center: X           Self Care:            Other:  TBD, per patient he only has rides to the clinic on Wednesday's, may need Home Health if not able to arrange transportation.

## 2018-12-24 NOTE — PROGRESS NOTES
Infectious Disease Progress Note    Author: Karen Hemphill M.D. Date & Time of service: 2018  9:13 AM    Chief Complaint:  Infected left leg ulcer    Interval History:  63 y.o. male admitted 2018 with non-healing leg ulcer and found to have polymicrobial wound   AF WBC 5.6 tolerating abx well-VAC placed   AF tolerating abx well-no complaints   AF WBC 5.9 no new complaints   afebrile WBC 6.8 patient states has been VAC change was performed this morning, he denies any leg pain, tolerating antibiotics without any issues, had 3 episodes of soft stools  Labs Reviewed, Medications Reviewed, Radiology Reviewed and Wound Reviewed.    Review of Systems:  Review of Systems   Constitutional: Negative for chills and fever.   Cardiovascular: Negative for chest pain.   Gastrointestinal: Positive for diarrhea. Negative for abdominal pain, nausea and vomiting.   Skin: Negative for rash.   All other systems reviewed and are negative.      Hemodynamics:  Temp (24hrs), Av.5 °C (97.7 °F), Min:36.4 °C (97.5 °F), Max:36.6 °C (97.9 °F)  Temperature: 36.4 °C (97.5 °F)  Pulse  Av.8  Min: 46  Max: 95  Blood Pressure: 135/98       Physical Exam:  Physical Exam   Constitutional: He is oriented to person, place, and time. He appears well-developed. No distress.   disheveled   HENT:   Head: Normocephalic and atraumatic.   Eyes: Pupils are equal, round, and reactive to light. EOM are normal. No scleral icterus.   Neck: Neck supple.   Cardiovascular: Normal rate.    No murmur heard.  Pulmonary/Chest: Breath sounds normal. No respiratory distress. He has no wheezes.   Abdominal: Soft. There is no rebound and no guarding.   Musculoskeletal: He exhibits no edema.   VAC LLE   Neurological: He is alert and oriented to person, place, and time.   Skin: He is not diaphoretic.   tattoos   Nursing note and vitals reviewed.      Meds:    Current Facility-Administered Medications:   •  hydrALAZINE  •  oxyCODONE  immediate-release **OR** oxyCODONE immediate-release  •  piperacillin-tazobactam **AND** [DISCONTINUED] MD Alert...Vancomycin per Pharmacy  •  heparin  •  senna-docusate **AND** polyethylene glycol/lytes **AND** magnesium hydroxide **AND** bisacodyl  •  Respiratory Care per Protocol  •  NS  •  acetaminophen  •  ondansetron  •  ondansetron  •  promethazine  •  promethazine  •  prochlorperazine  •  nicotine **AND** Nicotine Replacement Patient Education Materials **AND** nicotine polacrilex  •  linezolid  •  amLODIPine  •  propranolol    Labs:  Recent Labs      12/22/18   0036  12/23/18   0129  12/24/18   0026   WBC  5.7  5.9  6.8   RBC  4.09*  4.29*  4.36*   HEMOGLOBIN  12.7*  13.1*  13.6*   HEMATOCRIT  39.6*  41.8*  42.5   MCV  96.8  97.4  97.5   MCH  31.1  30.5  31.2   RDW  58.4*  57.7*  57.1*   PLATELETCT  234  235  222   MPV  9.2  9.3  9.3   NEUTSPOLYS  55.30  56.80  63.60   LYMPHOCYTES  30.00  29.20  22.20   MONOCYTES  9.00  7.60  8.30   EOSINOPHILS  4.60  5.60  4.90   BASOPHILS  0.90  0.80  0.90     Recent Labs      12/22/18   0036  12/23/18   0129  12/24/18   0025   SODIUM  140  136  136   POTASSIUM  4.5  4.6  4.4   CHLORIDE  110  108  109   CO2  21  22  20   GLUCOSE  82  86  95   BUN  29*  28*  27*     Recent Labs      12/22/18   0036  12/23/18   0129  12/24/18   0025   CREATININE  1.34  1.35  1.24       Imaging:  Ct-chest (thorax) With    Result Date: 12/20/2018 12/20/2018 7:01 PM HISTORY/REASON FOR EXAM: Smoker. Evaluate for malignancy. 30 pack-year smoking history. Spiculated nodule on prior plain film TECHNIQUE/EXAM DESCRIPTION: CT scan of the chest with contrast. Helical images were obtained from the lung apices through the adrenal glands following the bolus administration of  70 mL of Omnipaque 350 nonionic contrast. Sagittal and coronal reconstructions were performed. Low dose optimization technique was utilized for this CT exam including automated exposure control and adjustment of the mA and/or kV  according to patient size. COMPARISON:  Plain film of the chest 12/5/2018. FINDINGS: Atherosclerotic plaque is seen in the aorta. Ascending aorta measures 4 cm in diameter. The heart is enlarged. No pericardial or pleural effusion is identified. No filling defects are seen within the large central pulmonary arteries. There are small mediastinal lymph nodes. There is no hilar lymphadenopathy. There are multiple small axillary lymph nodes bilaterally. There is mild left apical scarring. There is an ill-defined opacity at the in the right upper lobe with adjacent linear opacity. The nodular component measures 1 x 0.95 cm. There is bibasilar atelectasis. No pneumothorax is identified. Central airways are patent. Limited views were obtained of the upper abdomen. There is renal cortical scarring involving the kidneys bilaterally. Mild degenerative changes are seen in the spine. There are postsurgical and old posttraumatic changes of the proximal left humerus.     Ill-defined right upper lobe nodular opacity with surrounding linear opacities. This could be related to scarring or malignancy. Further evaluation with PET/CT is recommended. Bibasilar atelectasis. Cardiomegaly. Bilateral renal cortical scarring. Dilated ascending aorta measuring 4 cm.    Dx-chest-2 Views    Result Date: 12/5/2018 12/5/2018 9:09 AM HISTORY/REASON FOR EXAM:  Abnormal ultrasound.  Smoking history.  Hepatitis C and anemia. TECHNIQUE/EXAM DESCRIPTION AND NUMBER OF VIEWS: Two views of the chest. COMPARISON:  None available. FINDINGS: Cardiomediastinal contour is within normal limits. Nodular densities project over both lower lungs, likely nipple shadows. Small apparent spiculated nodular density projects over the RIGHT lung apex measuring approximately 9 mm.  Not demonstrated on lateral view. No pleural fluid collection or pneumothorax. No major bony abnormality is seen.     1.  No pneumonia or pneumothorax. 2.  Possible spiculated nodule at the  "RIGHT lung apex measuring 9 mm.  Recommend further evaluation with CT chest. 3.  Probable bibasilar nipple shadows.    Dx-foot-complete 3+ Left    Result Date: 12/19/2018 12/19/2018 12:52 AM HISTORY/REASON FOR EXAM:  Left foot pain without trauma TECHNIQUE/EXAM DESCRIPTION:  AP, lateral, and oblique views of the LEFT foot. COMPARISON:  None. FINDINGS: No acute fracture is seen. There is severe pes planus deformity identified with bony remodeling of the midfoot bony structures.     1.  No acute traumatic bony injury. 2.  Severe pes planus deformity with remodeling of the midfoot bony structures. Appearance suggests Charcot joints.    Dx-tibia And Fibula Left    Result Date: 12/19/2018 12/19/2018 12:52 AM HISTORY/REASON FOR EXAM:  Atraumatic leg pain. TECHNIQUE/EXAM DESCRIPTION:  AP and lateral views of the LEFT tibia and fibula. COMPARISON:  None. FINDINGS: There is no visualized acute fracture. There is severe pes planus deformity identified with extensive degenerative changes and bony remodeling of the midfoot.     1.  No acute traumatic bony injury. 2.  Severe pes planus deformity with extensive degenerative changes and bony remodeling of the midfoot.      Micro:  Results     Procedure Component Value Units Date/Time    BLOOD CULTURE x2 [[id number]] Collected:  12/19/18 0031    Order Status:  Completed Specimen:  Blood from Peripheral Updated:  12/24/18 0300     Significant Indicator NEG     Source BLD     Site PERIPHERAL     Blood Culture No growth after 5 days of incubation.    Narrative:       Per Hospital Policy: Only change Specimen Src: to \"Line\" if  specified by physician order.    BLOOD CULTURE x2 [[id number]] Collected:  12/19/18 0006    Order Status:  Completed Specimen:  Blood from Peripheral Updated:  12/24/18 0300     Significant Indicator NEG     Source BLD     Site PERIPHERAL     Blood Culture No growth after 5 days of incubation.    Narrative:       Per Hospital Policy: Only change Specimen Src: to " "\"Line\" if  specified by physician order.      Culture & Susceptibility     ENTEROCOCCUS FAECALIS     Antibiotic Sensitivity Microscan Unit Status   Ampicillin Sensitive <=2 mcg/mL Final   Daptomycin Sensitive 2 mcg/mL Final   Gent Synergy Resistant >500 mcg/mL Final   Penicillin Sensitive 2 mcg/mL Final   Strep Synergy Sensitive <=1000 mcg/mL Final   Vancomycin Sensitive 2 mcg/mL Final         KLEBSIELLA OXYTOCA     Antibiotic Sensitivity Microscan Unit Status   Ampicillin Intermediate 16 mcg/mL Final   Cefepime Sensitive <=8 mcg/mL Final   Cefotaxime Sensitive <=2 mcg/mL Final   Cefotetan Sensitive <=16 mcg/mL Final   Ceftazidime Sensitive <=1 mcg/mL Final   Ceftriaxone Sensitive <=8 mcg/mL Final   Cefuroxime Sensitive <=4 mcg/mL Final   Ciprofloxacin Sensitive <=1 mcg/mL Final   Ertapenem Sensitive <=1 mcg/mL Final   Gentamicin Sensitive <=4 mcg/mL Final   Pip/Tazobactam Sensitive <=16 mcg/mL Final   Tigecycline Sensitive <=2 mcg/mL Final   Tobramycin Sensitive <=4 mcg/mL Final   Trimeth/Sulfa Sensitive <=2/38 mcg/mL Final         METHICILLIN RESISTANT STAPHYLOCOCCUS AUREUS     Antibiotic Sensitivity Microscan Unit Status   Ampicillin/sulbactam Resistant 16/8 mcg/mL Final   Clindamycin Sensitive <=0.5 mcg/mL Final   Daptomycin Sensitive <=0.5 mcg/mL Final   Erythromycin Resistant >4 mcg/mL Final   Moxifloxacin Sensitive 1 mcg/mL Final   Oxacillin Resistant >2 mcg/mL Final   Penicillin Resistant >8 mcg/mL Final   Tetracycline Sensitive <=4 mcg/mL Final   Trimeth/Sulfa Sensitive <=0.5/9.5 mcg/mL Final   Vancomycin Sensitive 1 mcg/mL Final         PSEUDOMONAS AERUGINOSA     Antibiotic Sensitivity Microscan Unit Status   Amikacin Sensitive <=16 mcg/mL Final   Cefepime Sensitive <=8 mcg/mL Final   Ceftazidime Sensitive 4 mcg/mL Final   Ciprofloxacin Sensitive <=1 mcg/mL Final   Gentamicin Sensitive <=4 mcg/mL Final   Imipenem Sensitive <=1 mcg/mL Final   Meropenem Sensitive <=1 mcg/mL Final   Pip/Tazobactam Sensitive " <=16 mcg/mL Final   Tobramycin Sensitive <=4 mcg/mL Final                   Assessment:  Active Hospital Problems    Diagnosis   • Infected ulcer of skin (HCC) [L98.499, L08.9]   • Hyperlipidemia [E78.5]   • Chronic hepatitis C without hepatic coma (HCC) [B18.2]   • Anxiety [F41.9]   • Stage 3 chronic kidney disease (HCC) [N18.3]   • HTN (hypertension) [I10]   • Tobacco use [Z72.0]       Plan:  Infected ulcer of left leg   Infected venous stasis ulcer  Afebrile  No leukocytosis  Wound culture grows  Pseudomonas, MRSA, E faecalis and Klebsiella  C ontinue IV Zosyn and zyvox for now  Wound vac placed  LPS consulting.  -can change to PO quinolone at discharge with Zyvox  Stop date 1/2/2019    Diarrhea, new  DC stool softeners  If persists, check C. difficile PCR    Chronic hepatitis C without hepatic coma   In process of workup from GI Dr Park -  MRI abdomen with and without and CT chest with ordered as patient missed these appointments for admission  FU GI for treatment    Lung nodule  Concern for underlying malignancy.  Biopsy if feasible  PET scan planned      Chronic kidney disease   Maintaining hydration  Avoiding nephrotoxics: NSAIDs etc    Dispo: TBD

## 2018-12-24 NOTE — CARE PLAN
Problem: Safety  Goal: Will remain free from injury    Intervention: Provide assistance with mobility  Pt is A&Ox4, up standby assist with a steady gait. Pt calls appropriately for assistance when ambulating.       Problem: Pain Management  Goal: Pain level will decrease to patient's comfort goal    Intervention: Follow pain managment plan developed in collaboration with patient and Interdisciplinary Team  Pt reports pain using 1-10 pain scale. Medicated per MAR.

## 2018-12-24 NOTE — PROGRESS NOTES
[facility] Medicine Daily Progress Note    Date of Service  12/24/2018    Chief Complaint  63 y.o. male admitted 12/18/2018 with non-healing leg ulcer and found to have polymicrobial wound culture and told to present to ED.    Hospital Course    Patient admitted and started on Vanco and zosyn.  LPS consulted and reviewed with surgery, no debridement planned at this time.  Wound vac in place and ID consulted for recommendations.      Interval Problem Update  12/20 Patient states he feels no pain and is doing okay.  He is concerned that he missed the appointments for imaging ordered by his GI physician, Dr Park for evaluation for concern of hepatocellular carcinoma given his history of hepatitis C.  As patient is a high risk for cancer, MRI and CT ordered inpatient for further evaluation.  12/21 Patient feeling well.  With wound vac in place, he cannot have MRI at this time, already received contrast for CT chest so will not have CT abdomen/pelvis with contrast at this time.  CT chest does show a mass in the RUL but it has been watched for years and is continuing to decrease in size and appearance is not typical of a malignancy.  Will order AFP for blood work today to further evaluate for HCC.  Coordination for wound vac on discharge in process as this is anticipated to take many days to do so.  12/22 Patient without significant changes overnight, states he feels well and is tolerating the infusions without issue.  His appetite is good along and normal urination and bowel movements.  12/23 Patient states he is feeling well, good appetite.  He is please to hear his A1C is not diabetic and the tumor marker for hepatocellular carcinoma is normal.  Patient to remain in hospital until wound vac can be coordinated for discharge.  12/24 Patient states he has no complaints.  Wound vac dressing was changed earlier today.  RN reported that patient has had loose stools, very low suspicion of c diff at this  time.    Consultants/Specialty  ID -[place]    Code Status  full    Disposition  Home once wound vac coordinated.    Review of Systems  Review of Systems   Constitutional: Negative for chills and fever.   HENT: Negative for congestion and sore throat.    Eyes: Negative for blurred vision and photophobia.   Respiratory: Negative for cough and shortness of breath.    Cardiovascular: Negative for chest pain, claudication and leg swelling.   Gastrointestinal: Negative for abdominal pain, constipation, diarrhea, heartburn, nausea and vomiting.   Genitourinary: Negative for dysuria and hematuria.   Musculoskeletal: Negative for joint pain and myalgias.   Skin: Negative for itching and rash.        Non-healing ulcer L calf   Neurological: Negative for dizziness, sensory change, speech change, weakness and headaches.   Psychiatric/Behavioral: Negative for depression. The patient is not nervous/anxious and does not have insomnia.         Physical Exam  Temp:  [36.4 °C (97.5 °F)-36.6 °C (97.9 °F)] 36.4 °C (97.5 °F)  Pulse:  [51-79] 73  Resp:  [17-18] 18  BP: (135-178)/() 135/98    Physical Exam   Constitutional: He is oriented to person, place, and time. He appears well-developed and well-nourished. No distress.   HENT:   Head: Normocephalic and atraumatic.   Eyes: Conjunctivae are normal. No scleral icterus.   Neck: Neck supple. No JVD present.   Cardiovascular: Normal rate, regular rhythm and normal heart sounds.  Exam reveals no gallop and no friction rub.    No murmur heard.  Pulmonary/Chest: Effort normal and breath sounds normal. No respiratory distress. He has no wheezes. He exhibits no tenderness.   Abdominal: Soft. Bowel sounds are normal. He exhibits no distension and no mass. There is no tenderness.   Musculoskeletal: He exhibits no edema or tenderness.   Lymphadenopathy:     He has no cervical adenopathy.   Neurological: He is alert and oriented to person, place, and time. No cranial nerve deficit.   Skin:  Skin is warm and dry. He is not diaphoretic. No erythema. No pallor.   Wound vac in place LLE over ulcer     Psychiatric: He has a normal mood and affect. His behavior is normal.   Nursing note and vitals reviewed.      Fluids    Intake/Output Summary (Last 24 hours) at 12/24/18 1519  Last data filed at 12/24/18 1000   Gross per 24 hour   Intake             1471 ml   Output             1225 ml   Net              246 ml       Laboratory  Recent Labs      12/22/18   0036  12/23/18   0129  12/24/18   0026   WBC  5.7  5.9  6.8   RBC  4.09*  4.29*  4.36*   HEMOGLOBIN  12.7*  13.1*  13.6*   HEMATOCRIT  39.6*  41.8*  42.5   MCV  96.8  97.4  97.5   MCH  31.1  30.5  31.2   MCHC  32.1*  31.3*  32.0*   RDW  58.4*  57.7*  57.1*   PLATELETCT  234  235  222   MPV  9.2  9.3  9.3     Recent Labs      12/22/18   0036  12/23/18   0129  12/24/18   0025   SODIUM  140  136  136   POTASSIUM  4.5  4.6  4.4   CHLORIDE  110  108  109   CO2  21  22  20   GLUCOSE  82  86  95   BUN  29*  28*  27*   CREATININE  1.34  1.35  1.24   CALCIUM  8.9  9.1  9.0                   Imaging  CT-CHEST (THORAX) WITH   Final Result      Ill-defined right upper lobe nodular opacity with surrounding linear opacities. This could be related to scarring or malignancy. Further evaluation with PET/CT is recommended.      Bibasilar atelectasis.      Cardiomegaly.      Bilateral renal cortical scarring.      Dilated ascending aorta measuring 4 cm.      DX-FOOT-COMPLETE 3+ LEFT   Final Result         1.  No acute traumatic bony injury.   2.  Severe pes planus deformity with remodeling of the midfoot bony structures. Appearance suggests Charcot joints.      DX-TIBIA AND FIBULA LEFT   Final Result         1.  No acute traumatic bony injury.   2.  Severe pes planus deformity with extensive degenerative changes and bony remodeling of the midfoot.      MR-ABDOMEN-WITH & W/O    (Results Pending)        Assessment/Plan  Infected ulcer of skin (HCC)- (present on admission)    Assessment & Plan    Infected left lower extremity venous stasis ulcer  Wound culture grows  Pseudomonas, MRSA, E faecalis and Klebsiella   IV vancomycin and IV Zosyn started on admission  ID consulted - continue zosyn - dc vanco and start zyvox.  Wound vac placed, outpatient wound vac being coordinated.  LPS consulting.  ABX through 1/2/19 - quinolone can be substituted for zosyn on dc per ID           Hyperlipidemia- (present on admission)   Assessment & Plan    Continue atorvastatin 40     Chronic hepatitis C without hepatic coma (HCC)- (present on admission)   Assessment & Plan    In process of workup from GI Dr Park -CT chest completed showing : right upper lobe nodular opacity with surrounding linear opacities, further evaluation with PET recommended vs serial CT Scan    MRI abdomen postponed as not compatible with wound vac, CT abdomen/pelvis on hold given recent contrast with CT chest.  AFP ordered.         Anxiety- (present on admission)   Assessment & Plan    Continue Celexa     Stage 3 chronic kidney disease (HCC)- (present on admission)   Assessment & Plan    Maintaining hydration  Avoiding nephrotoxics: NSAIDs etc  Following BMP       HTN (hypertension)- (present on admission)   Assessment & Plan    Continue Norvasc and Inderal     Tobacco use- (present on admission)   Assessment & Plan    Nicotine replacement has been provided          VTE prophylaxis: heparin as no surgery planned

## 2018-12-24 NOTE — PROGRESS NOTES
Assumed care of patient at 1900. Pt is A&Ox4, up standby assist with a steady gait. Right PIV in place, infusing NS @ 75 mL/hr. Wound vac in place to LLE. Pt reports 5/10 LLE pain, medicated per MAR. Contact precautions in place for MRSA. No further needs at this time. Call light within reach, will continue to round hourly.

## 2018-12-25 PROBLEM — R91.1 LUNG NODULE: Status: ACTIVE | Noted: 2017-11-16

## 2018-12-25 LAB
ANION GAP SERPL CALC-SCNC: 6 MMOL/L (ref 0–11.9)
BASOPHILS # BLD AUTO: 0.8 % (ref 0–1.8)
BASOPHILS # BLD: 0.05 K/UL (ref 0–0.12)
BUN SERPL-MCNC: 28 MG/DL (ref 8–22)
CALCIUM SERPL-MCNC: 8.9 MG/DL (ref 8.5–10.5)
CHLORIDE SERPL-SCNC: 109 MMOL/L (ref 96–112)
CO2 SERPL-SCNC: 23 MMOL/L (ref 20–33)
CREAT SERPL-MCNC: 1.2 MG/DL (ref 0.5–1.4)
EOSINOPHIL # BLD AUTO: 0.28 K/UL (ref 0–0.51)
EOSINOPHIL NFR BLD: 4.7 % (ref 0–6.9)
ERYTHROCYTE [DISTWIDTH] IN BLOOD BY AUTOMATED COUNT: 56 FL (ref 35.9–50)
GLUCOSE SERPL-MCNC: 90 MG/DL (ref 65–99)
HCT VFR BLD AUTO: 41.5 % (ref 42–52)
HGB BLD-MCNC: 13.7 G/DL (ref 14–18)
IMM GRANULOCYTES # BLD AUTO: 0.02 K/UL (ref 0–0.11)
IMM GRANULOCYTES NFR BLD AUTO: 0.3 % (ref 0–0.9)
LYMPHOCYTES # BLD AUTO: 1.78 K/UL (ref 1–4.8)
LYMPHOCYTES NFR BLD: 29.8 % (ref 22–41)
MCH RBC QN AUTO: 31.1 PG (ref 27–33)
MCHC RBC AUTO-ENTMCNC: 33 G/DL (ref 33.7–35.3)
MCV RBC AUTO: 94.3 FL (ref 81.4–97.8)
MONOCYTES # BLD AUTO: 0.46 K/UL (ref 0–0.85)
MONOCYTES NFR BLD AUTO: 7.7 % (ref 0–13.4)
NEUTROPHILS # BLD AUTO: 3.38 K/UL (ref 1.82–7.42)
NEUTROPHILS NFR BLD: 56.7 % (ref 44–72)
NRBC # BLD AUTO: 0 K/UL
NRBC BLD-RTO: 0 /100 WBC
PLATELET # BLD AUTO: 227 K/UL (ref 164–446)
PMV BLD AUTO: 9 FL (ref 9–12.9)
POTASSIUM SERPL-SCNC: 4.7 MMOL/L (ref 3.6–5.5)
RBC # BLD AUTO: 4.4 M/UL (ref 4.7–6.1)
SODIUM SERPL-SCNC: 138 MMOL/L (ref 135–145)
WBC # BLD AUTO: 6 K/UL (ref 4.8–10.8)

## 2018-12-25 PROCEDURE — A9270 NON-COVERED ITEM OR SERVICE: HCPCS | Performed by: INTERNAL MEDICINE

## 2018-12-25 PROCEDURE — 36415 COLL VENOUS BLD VENIPUNCTURE: CPT

## 2018-12-25 PROCEDURE — 80048 BASIC METABOLIC PNL TOTAL CA: CPT

## 2018-12-25 PROCEDURE — 700105 HCHG RX REV CODE 258: Performed by: INTERNAL MEDICINE

## 2018-12-25 PROCEDURE — 700102 HCHG RX REV CODE 250 W/ 637 OVERRIDE(OP): Performed by: INTERNAL MEDICINE

## 2018-12-25 PROCEDURE — 700111 HCHG RX REV CODE 636 W/ 250 OVERRIDE (IP): Performed by: INTERNAL MEDICINE

## 2018-12-25 PROCEDURE — 85025 COMPLETE CBC W/AUTO DIFF WBC: CPT

## 2018-12-25 PROCEDURE — 99232 SBSQ HOSP IP/OBS MODERATE 35: CPT | Performed by: INTERNAL MEDICINE

## 2018-12-25 PROCEDURE — 770021 HCHG ROOM/CARE - ISO PRIVATE

## 2018-12-25 PROCEDURE — 99232 SBSQ HOSP IP/OBS MODERATE 35: CPT | Performed by: FAMILY MEDICINE

## 2018-12-25 RX ORDER — AMLODIPINE BESYLATE 10 MG/1
10 TABLET ORAL
Status: DISCONTINUED | OUTPATIENT
Start: 2018-12-26 | End: 2018-12-28 | Stop reason: HOSPADM

## 2018-12-25 RX ADMIN — PROPRANOLOL HYDROCHLORIDE 10 MG: 10 TABLET ORAL at 06:01

## 2018-12-25 RX ADMIN — OXYCODONE HYDROCHLORIDE 10 MG: 10 TABLET ORAL at 06:01

## 2018-12-25 RX ADMIN — PIPERACILLIN AND TAZOBACTAM 4.5 G: 4; .5 INJECTION, POWDER, LYOPHILIZED, FOR SOLUTION INTRAVENOUS; PARENTERAL at 15:06

## 2018-12-25 RX ADMIN — HEPARIN SODIUM 5000 UNITS: 5000 INJECTION, SOLUTION INTRAVENOUS; SUBCUTANEOUS at 15:05

## 2018-12-25 RX ADMIN — LINEZOLID 600 MG: 600 TABLET, FILM COATED ORAL at 06:01

## 2018-12-25 RX ADMIN — LINEZOLID 600 MG: 600 TABLET, FILM COATED ORAL at 17:48

## 2018-12-25 RX ADMIN — OXYCODONE HYDROCHLORIDE 10 MG: 10 TABLET ORAL at 11:25

## 2018-12-25 RX ADMIN — OXYCODONE HYDROCHLORIDE 10 MG: 10 TABLET ORAL at 01:32

## 2018-12-25 RX ADMIN — OXYCODONE HYDROCHLORIDE 10 MG: 10 TABLET ORAL at 15:11

## 2018-12-25 RX ADMIN — PIPERACILLIN AND TAZOBACTAM 4.5 G: 4; .5 INJECTION, POWDER, LYOPHILIZED, FOR SOLUTION INTRAVENOUS; PARENTERAL at 21:21

## 2018-12-25 RX ADMIN — PIPERACILLIN AND TAZOBACTAM 4.5 G: 4; .5 INJECTION, POWDER, LYOPHILIZED, FOR SOLUTION INTRAVENOUS; PARENTERAL at 06:01

## 2018-12-25 RX ADMIN — NICOTINE 14 MG: 14 PATCH, EXTENDED RELEASE TRANSDERMAL at 06:01

## 2018-12-25 RX ADMIN — OXYCODONE HYDROCHLORIDE 10 MG: 10 TABLET ORAL at 21:20

## 2018-12-25 RX ADMIN — AMLODIPINE BESYLATE 5 MG: 5 TABLET ORAL at 06:01

## 2018-12-25 RX ADMIN — PROPRANOLOL HYDROCHLORIDE 10 MG: 10 TABLET ORAL at 17:48

## 2018-12-25 RX ADMIN — SODIUM CHLORIDE: 9 INJECTION, SOLUTION INTRAVENOUS at 06:02

## 2018-12-25 RX ADMIN — HEPARIN SODIUM 5000 UNITS: 5000 INJECTION, SOLUTION INTRAVENOUS; SUBCUTANEOUS at 06:00

## 2018-12-25 RX ADMIN — HEPARIN SODIUM 5000 UNITS: 5000 INJECTION, SOLUTION INTRAVENOUS; SUBCUTANEOUS at 21:20

## 2018-12-25 ASSESSMENT — PATIENT HEALTH QUESTIONNAIRE - PHQ9
2. FEELING DOWN, DEPRESSED, IRRITABLE, OR HOPELESS: NOT AT ALL
SUM OF ALL RESPONSES TO PHQ9 QUESTIONS 1 AND 2: 0
1. LITTLE INTEREST OR PLEASURE IN DOING THINGS: NOT AT ALL

## 2018-12-25 ASSESSMENT — ENCOUNTER SYMPTOMS
ABDOMINAL PAIN: 0
DIARRHEA: 0
CLAUDICATION: 0
DEPRESSION: 0
BLURRED VISION: 0
PALPITATIONS: 0
TINGLING: 0
PHOTOPHOBIA: 0
VOMITING: 0
HEARTBURN: 0
NECK PAIN: 0
INSOMNIA: 0
NAUSEA: 0
MYALGIAS: 0
FEVER: 0
DIZZINESS: 0
COUGH: 0
WEIGHT LOSS: 0
NERVOUS/ANXIOUS: 0
HEADACHES: 0
SHORTNESS OF BREATH: 0
CHILLS: 0
HEMOPTYSIS: 0
DOUBLE VISION: 0

## 2018-12-25 ASSESSMENT — PAIN SCALES - GENERAL
PAINLEVEL_OUTOF10: 3
PAINLEVEL_OUTOF10: 2
PAINLEVEL_OUTOF10: 3
PAINLEVEL_OUTOF10: 1
PAINLEVEL_OUTOF10: 1
PAINLEVEL_OUTOF10: 4

## 2018-12-25 NOTE — PROGRESS NOTES
Received report from day RN. POC discussed with patient, pt verbalizes understanding and agreement.  A&Ox4. Pt is up with 1x assist, he calls appropriately for help. Wound vac to LLE, working appropriately per orders. PRN oxycodone given for pain. Patient with high BPs tonight, PRN hydralazine given with good results. PIV patent. Proper isolation precautions in place. Call light in reach, bed in low position, Q2 hr rounding.

## 2018-12-25 NOTE — PROGRESS NOTES
[facility] Medicine Daily Progress Note    Date of Service  12/25/2018    Chief Complaint  63 y.o. male admitted 12/18/2018 with non-healing leg ulcer and found to have polymicrobial wound culture and told to present to ED.    Hospital Course    Patient admitted and started on Vanco and zosyn.  LPS consulted and reviewed with surgery, no debridement planned at this time.  Wound vac in place and ID consulted for recommendations.      Interval Problem Update  12/20 Patient states he feels no pain and is doing okay.  He is concerned that he missed the appointments for imaging ordered by his GI physician, Dr Park for evaluation for concern of hepatocellular carcinoma given his history of hepatitis C.  As patient is a high risk for cancer, MRI and CT ordered inpatient for further evaluation.  12/21 Patient feeling well.  With wound vac in place, he cannot have MRI at this time, already received contrast for CT chest so will not have CT abdomen/pelvis with contrast at this time.  CT chest does show a mass in the RUL but it has been watched for years and is continuing to decrease in size and appearance is not typical of a malignancy.  Will order AFP for blood work today to further evaluate for HCC.  Coordination for wound vac on discharge in process as this is anticipated to take many days to do so.  12/22 Patient without significant changes overnight, states he feels well and is tolerating the infusions without issue.  His appetite is good along and normal urination and bowel movements.  12/23 Patient states he is feeling well, good appetite.  He is please to hear his A1C is not diabetic and the tumor marker for hepatocellular carcinoma is normal.  Patient to remain in hospital until wound vac can be coordinated for discharge.  12/24 Patient states he has no complaints.  Wound vac dressing was changed earlier today.  RN reported that patient has had loose stools, very low suspicion of c diff at this time.  12/25: Laying in  bed comfortably.  Wound VAC still draining white purulent blood-tinged discharges.  Pain is fairly controlled.  No acute distress noted.  Consultants/Specialty  ID -Hemphill    Code Status  full    Disposition  Home once wound vac coordinated.    Review of Systems  Review of Systems   Constitutional: Negative for chills, fever and weight loss.   HENT: Negative for ear pain, hearing loss and tinnitus.    Eyes: Negative for blurred vision, double vision and photophobia.   Respiratory: Negative for cough, hemoptysis and shortness of breath.    Cardiovascular: Negative for chest pain, palpitations, claudication and leg swelling.   Gastrointestinal: Negative for abdominal pain, heartburn, nausea and vomiting.   Genitourinary: Negative for dysuria, hematuria and urgency.   Musculoskeletal: Negative for joint pain, myalgias and neck pain.   Skin: Negative for itching.        Non-healing ulcer L calf   Neurological: Negative for dizziness, tingling and headaches.   Psychiatric/Behavioral: Negative for depression and suicidal ideas. The patient is not nervous/anxious and does not have insomnia.         Physical Exam  Temp:  [36.3 °C (97.3 °F)-36.8 °C (98.3 °F)] 36.4 °C (97.6 °F)  Pulse:  [52-81] 81  Resp:  [16-18] 16  BP: (120-169)/(72-99) 120/74    Physical Exam   Constitutional: He is oriented to person, place, and time. No distress.   HENT:   Head: Normocephalic and atraumatic.   Mouth/Throat: No oropharyngeal exudate.   Eyes: Conjunctivae are normal. Right eye exhibits no discharge. Left eye exhibits no discharge.   Neck: Neck supple. No JVD present. No thyromegaly present.   Cardiovascular: Normal rate and regular rhythm.  Exam reveals no gallop and no friction rub.    No murmur heard.  Pulmonary/Chest: Effort normal and breath sounds normal. No respiratory distress. He has no wheezes.   Abdominal: Soft. Bowel sounds are normal. He exhibits no distension. There is no tenderness.   Musculoskeletal: He exhibits no edema or  tenderness.   Wound vac in place LLE over ulcer   Lymphadenopathy:     He has no cervical adenopathy.   Neurological: He is alert and oriented to person, place, and time.   Skin: Skin is warm and dry. He is not diaphoretic. No erythema.   Wound vac in place LLE over ulcer     Psychiatric: He has a normal mood and affect.   Nursing note and vitals reviewed.      Fluids    Intake/Output Summary (Last 24 hours) at 12/25/18 1421  Last data filed at 12/25/18 0900   Gross per 24 hour   Intake             2427 ml   Output              600 ml   Net             1827 ml       Laboratory  Recent Labs      12/23/18   0129  12/24/18   0026  12/25/18   0009   WBC  5.9  6.8  6.0   RBC  4.29*  4.36*  4.40*   HEMOGLOBIN  13.1*  13.6*  13.7*   HEMATOCRIT  41.8*  42.5  41.5*   MCV  97.4  97.5  94.3   MCH  30.5  31.2  31.1   MCHC  31.3*  32.0*  33.0*   RDW  57.7*  57.1*  56.0*   PLATELETCT  235  222  227   MPV  9.3  9.3  9.0     Recent Labs      12/23/18   0129  12/24/18   0025  12/25/18   0009   SODIUM  136  136  138   POTASSIUM  4.6  4.4  4.7   CHLORIDE  108  109  109   CO2  22  20  23   GLUCOSE  86  95  90   BUN  28*  27*  28*   CREATININE  1.35  1.24  1.20   CALCIUM  9.1  9.0  8.9                   Imaging  CT-CHEST (THORAX) WITH   Final Result      Ill-defined right upper lobe nodular opacity with surrounding linear opacities. This could be related to scarring or malignancy. Further evaluation with PET/CT is recommended.      Bibasilar atelectasis.      Cardiomegaly.      Bilateral renal cortical scarring.      Dilated ascending aorta measuring 4 cm.      DX-FOOT-COMPLETE 3+ LEFT   Final Result         1.  No acute traumatic bony injury.   2.  Severe pes planus deformity with remodeling of the midfoot bony structures. Appearance suggests Charcot joints.      DX-TIBIA AND FIBULA LEFT   Final Result         1.  No acute traumatic bony injury.   2.  Severe pes planus deformity with extensive degenerative changes and bony remodeling  of the midfoot.      MR-ABDOMEN-WITH & W/O    (Results Pending)        Assessment/Plan  Infected ulcer of skin (HCC)- (present on admission)   Assessment & Plan    Infected left lower extremity venous stasis ulcer  Wound culture grows  Pseudomonas, MRSA, E faecalis and Klebsiella   IV vancomycin and IV Zosyn started on admission  ID consulted - continue zosyn - dc vanco and start zyvox.  Wound vac placed, outpatient wound vac being coordinated.  LPS consulting.  ABX through 1/2/19 - quinolone can be substituted for zosyn on dc per ID           Hyperlipidemia- (present on admission)   Assessment & Plan    Continue atorvastatin 40     Chronic hepatitis C without hepatic coma (HCC)- (present on admission)   Assessment & Plan    In process of workup from GI Dr Park -CT chest completed showing : right upper lobe nodular opacity with surrounding linear opacities, further evaluation with PET recommended vs serial CT Scan    MRI abdomen postponed as not compatible with wound vac, CT abdomen/pelvis on hold given recent contrast with CT chest.  AFP ordered.         Anxiety- (present on admission)   Assessment & Plan    Continue Celexa     Stage 3 chronic kidney disease (HCC)- (present on admission)   Assessment & Plan    Maintaining hydration  Avoiding nephrotoxics  Renal dose all medications.       Lung nodule- (present on admission)   Assessment & Plan    Right upper lobe nodular opacity seen on CT scan of the chest which was seen on previous scans.  This is suspicious for scar tissue versus malignancy.  Outpatient PET scan recommended.     HTN (hypertension)- (present on admission)   Assessment & Plan    Continue Norvasc and Inderal  Monitor blood pressure.     Tobacco use- (present on admission)   Assessment & Plan    Nicotine replacement has been provided     Plan of care discussed with multidisciplinary team during rounds.    VTE prophylaxis: heparin as no surgery planned

## 2018-12-25 NOTE — CARE PLAN
Problem: Venous Thromboembolism (VTW)/Deep Vein Thrombosis (DVT) Prevention:  Goal: Patient will participate in Venous Thrombosis (VTE)/Deep Vein Thrombosis (DVT)Prevention Measures  Outcome: PROGRESSING AS EXPECTED  Pt is receiving SQ heparin  Intervention: Encourage ambulation/mobilization at level directed by Physical Therapy in collaboration with Interdisciplinary Team  Pt ambulates to the bathroom with 1 assist.      Problem: Pain Management  Goal: Pain level will decrease to patient's comfort goal  Outcome: PROGRESSING AS EXPECTED    Intervention: Follow pain managment plan developed in collaboration with patient and Interdisciplinary Team  PRN oxycodone given for pain control.

## 2018-12-25 NOTE — PROGRESS NOTES
Infectious Disease Progress Note    Author: Karen Hemphill M.D. Date & Time of service: 2018  9:19 AM    Chief Complaint:  Infected left leg ulcer    Interval History:  63 y.o. male admitted 2018 with non-healing leg ulcer and found to have polymicrobial wound   AF WBC 5.6 tolerating abx well-VAC placed   AF tolerating abx well-no complaints   AF WBC 5.9 no new complaints   afebrile WBC 6.8 patient states has been VAC change was performed this morning, he denies any leg pain, tolerating antibiotics without any issues, had 3 episodes of soft stools   afebrile WBC 6 patient reports no issues overnight, he also had no episodes of diarrhea  Labs Reviewed, Medications Reviewed, Radiology Reviewed and Wound Reviewed.    Review of Systems:  Review of Systems   Constitutional: Negative for chills and fever.   Cardiovascular: Negative for chest pain.   Gastrointestinal: Negative for abdominal pain, diarrhea, nausea and vomiting.   Skin: Negative for rash.   All other systems reviewed and are negative.      Hemodynamics:  Temp (24hrs), Av.6 °C (97.8 °F), Min:36.3 °C (97.3 °F), Max:36.8 °C (98.3 °F)  Temperature: 36.4 °C (97.6 °F)  Pulse  Av.7  Min: 46  Max: 95  Blood Pressure: 120/74       Physical Exam:  Physical Exam   Constitutional: He is oriented to person, place, and time. He appears well-developed. No distress.   disheveled   HENT:   Head: Normocephalic and atraumatic.   Eyes: Pupils are equal, round, and reactive to light. EOM are normal. No scleral icterus.   Neck: Neck supple.   Cardiovascular: Normal rate.    No murmur heard.  Pulmonary/Chest: Breath sounds normal. No respiratory distress. He has no wheezes.   Abdominal: Soft. There is no rebound and no guarding.   Musculoskeletal: He exhibits no edema.   VAC LLE   Neurological: He is alert and oriented to person, place, and time.   Skin: He is not diaphoretic.   tattoos   Nursing note and vitals  reviewed.      Meds:    Current Facility-Administered Medications:   •  [START ON 12/26/2018] amLODIPine  •  hydrALAZINE  •  oxyCODONE immediate-release **OR** oxyCODONE immediate-release  •  piperacillin-tazobactam **AND** [DISCONTINUED] MD Alert...Vancomycin per Pharmacy  •  heparin  •  Respiratory Care per Protocol  •  NS  •  acetaminophen  •  ondansetron  •  ondansetron  •  promethazine  •  promethazine  •  prochlorperazine  •  nicotine **AND** Nicotine Replacement Patient Education Materials **AND** nicotine polacrilex  •  linezolid  •  propranolol    Labs:  Recent Labs      12/23/18 0129 12/24/18   0026  12/25/18   0009   WBC  5.9  6.8  6.0   RBC  4.29*  4.36*  4.40*   HEMOGLOBIN  13.1*  13.6*  13.7*   HEMATOCRIT  41.8*  42.5  41.5*   MCV  97.4  97.5  94.3   MCH  30.5  31.2  31.1   RDW  57.7*  57.1*  56.0*   PLATELETCT  235  222  227   MPV  9.3  9.3  9.0   NEUTSPOLYS  56.80  63.60  56.70   LYMPHOCYTES  29.20  22.20  29.80   MONOCYTES  7.60  8.30  7.70   EOSINOPHILS  5.60  4.90  4.70   BASOPHILS  0.80  0.90  0.80     Recent Labs      12/23/18 0129 12/24/18   0025  12/25/18   0009   SODIUM  136  136  138   POTASSIUM  4.6  4.4  4.7   CHLORIDE  108  109  109   CO2  22  20  23   GLUCOSE  86  95  90   BUN  28*  27*  28*     Recent Labs      12/23/18 0129 12/24/18   0025  12/25/18   0009   CREATININE  1.35  1.24  1.20       Imaging:  Ct-chest (thorax) With    Result Date: 12/20/2018 12/20/2018 7:01 PM HISTORY/REASON FOR EXAM: Smoker. Evaluate for malignancy. 30 pack-year smoking history. Spiculated nodule on prior plain film TECHNIQUE/EXAM DESCRIPTION: CT scan of the chest with contrast. Helical images were obtained from the lung apices through the adrenal glands following the bolus administration of  70 mL of Omnipaque 350 nonionic contrast. Sagittal and coronal reconstructions were performed. Low dose optimization technique was utilized for this CT exam including automated exposure control and adjustment  of the mA and/or kV according to patient size. COMPARISON:  Plain film of the chest 12/5/2018. FINDINGS: Atherosclerotic plaque is seen in the aorta. Ascending aorta measures 4 cm in diameter. The heart is enlarged. No pericardial or pleural effusion is identified. No filling defects are seen within the large central pulmonary arteries. There are small mediastinal lymph nodes. There is no hilar lymphadenopathy. There are multiple small axillary lymph nodes bilaterally. There is mild left apical scarring. There is an ill-defined opacity at the in the right upper lobe with adjacent linear opacity. The nodular component measures 1 x 0.95 cm. There is bibasilar atelectasis. No pneumothorax is identified. Central airways are patent. Limited views were obtained of the upper abdomen. There is renal cortical scarring involving the kidneys bilaterally. Mild degenerative changes are seen in the spine. There are postsurgical and old posttraumatic changes of the proximal left humerus.     Ill-defined right upper lobe nodular opacity with surrounding linear opacities. This could be related to scarring or malignancy. Further evaluation with PET/CT is recommended. Bibasilar atelectasis. Cardiomegaly. Bilateral renal cortical scarring. Dilated ascending aorta measuring 4 cm.    Dx-chest-2 Views    Result Date: 12/5/2018 12/5/2018 9:09 AM HISTORY/REASON FOR EXAM:  Abnormal ultrasound.  Smoking history.  Hepatitis C and anemia. TECHNIQUE/EXAM DESCRIPTION AND NUMBER OF VIEWS: Two views of the chest. COMPARISON:  None available. FINDINGS: Cardiomediastinal contour is within normal limits. Nodular densities project over both lower lungs, likely nipple shadows. Small apparent spiculated nodular density projects over the RIGHT lung apex measuring approximately 9 mm.  Not demonstrated on lateral view. No pleural fluid collection or pneumothorax. No major bony abnormality is seen.     1.  No pneumonia or pneumothorax. 2.  Possible  "spiculated nodule at the RIGHT lung apex measuring 9 mm.  Recommend further evaluation with CT chest. 3.  Probable bibasilar nipple shadows.    Dx-foot-complete 3+ Left    Result Date: 12/19/2018 12/19/2018 12:52 AM HISTORY/REASON FOR EXAM:  Left foot pain without trauma TECHNIQUE/EXAM DESCRIPTION:  AP, lateral, and oblique views of the LEFT foot. COMPARISON:  None. FINDINGS: No acute fracture is seen. There is severe pes planus deformity identified with bony remodeling of the midfoot bony structures.     1.  No acute traumatic bony injury. 2.  Severe pes planus deformity with remodeling of the midfoot bony structures. Appearance suggests Charcot joints.    Dx-tibia And Fibula Left    Result Date: 12/19/2018 12/19/2018 12:52 AM HISTORY/REASON FOR EXAM:  Atraumatic leg pain. TECHNIQUE/EXAM DESCRIPTION:  AP and lateral views of the LEFT tibia and fibula. COMPARISON:  None. FINDINGS: There is no visualized acute fracture. There is severe pes planus deformity identified with extensive degenerative changes and bony remodeling of the midfoot.     1.  No acute traumatic bony injury. 2.  Severe pes planus deformity with extensive degenerative changes and bony remodeling of the midfoot.      Micro:  Results     Procedure Component Value Units Date/Time    BLOOD CULTURE x2 [[id number]] Collected:  12/19/18 0031    Order Status:  Completed Specimen:  Blood from Peripheral Updated:  12/24/18 0300     Significant Indicator NEG     Source BLD     Site PERIPHERAL     Blood Culture No growth after 5 days of incubation.    Narrative:       Per Hospital Policy: Only change Specimen Src: to \"Line\" if  specified by physician order.    BLOOD CULTURE x2 [[id number]] Collected:  12/19/18 0006    Order Status:  Completed Specimen:  Blood from Peripheral Updated:  12/24/18 0300     Significant Indicator NEG     Source BLD     Site PERIPHERAL     Blood Culture No growth after 5 days of incubation.    Narrative:       Per Hospital Policy: " "Only change Specimen Src: to \"Line\" if  specified by physician order.      Culture & Susceptibility     ENTEROCOCCUS FAECALIS     Antibiotic Sensitivity Microscan Unit Status   Ampicillin Sensitive <=2 mcg/mL Final   Daptomycin Sensitive 2 mcg/mL Final   Gent Synergy Resistant >500 mcg/mL Final   Penicillin Sensitive 2 mcg/mL Final   Strep Synergy Sensitive <=1000 mcg/mL Final   Vancomycin Sensitive 2 mcg/mL Final         KLEBSIELLA OXYTOCA     Antibiotic Sensitivity Microscan Unit Status   Ampicillin Intermediate 16 mcg/mL Final   Cefepime Sensitive <=8 mcg/mL Final   Cefotaxime Sensitive <=2 mcg/mL Final   Cefotetan Sensitive <=16 mcg/mL Final   Ceftazidime Sensitive <=1 mcg/mL Final   Ceftriaxone Sensitive <=8 mcg/mL Final   Cefuroxime Sensitive <=4 mcg/mL Final   Ciprofloxacin Sensitive <=1 mcg/mL Final   Ertapenem Sensitive <=1 mcg/mL Final   Gentamicin Sensitive <=4 mcg/mL Final   Pip/Tazobactam Sensitive <=16 mcg/mL Final   Tigecycline Sensitive <=2 mcg/mL Final   Tobramycin Sensitive <=4 mcg/mL Final   Trimeth/Sulfa Sensitive <=2/38 mcg/mL Final         METHICILLIN RESISTANT STAPHYLOCOCCUS AUREUS     Antibiotic Sensitivity Microscan Unit Status   Ampicillin/sulbactam Resistant 16/8 mcg/mL Final   Clindamycin Sensitive <=0.5 mcg/mL Final   Daptomycin Sensitive <=0.5 mcg/mL Final   Erythromycin Resistant >4 mcg/mL Final   Moxifloxacin Sensitive 1 mcg/mL Final   Oxacillin Resistant >2 mcg/mL Final   Penicillin Resistant >8 mcg/mL Final   Tetracycline Sensitive <=4 mcg/mL Final   Trimeth/Sulfa Sensitive <=0.5/9.5 mcg/mL Final   Vancomycin Sensitive 1 mcg/mL Final         PSEUDOMONAS AERUGINOSA     Antibiotic Sensitivity Microscan Unit Status   Amikacin Sensitive <=16 mcg/mL Final   Cefepime Sensitive <=8 mcg/mL Final   Ceftazidime Sensitive 4 mcg/mL Final   Ciprofloxacin Sensitive <=1 mcg/mL Final   Gentamicin Sensitive <=4 mcg/mL Final   Imipenem Sensitive <=1 mcg/mL Final   Meropenem Sensitive <=1 mcg/mL Final "   Pip/Tazobactam Sensitive <=16 mcg/mL Final   Tobramycin Sensitive <=4 mcg/mL Final                   Assessment:  Active Hospital Problems    Diagnosis   • Infected ulcer of skin (HCC) [L98.499, L08.9]   • Hyperlipidemia [E78.5]   • Chronic hepatitis C without hepatic coma (HCC) [B18.2]   • Anxiety [F41.9]   • Stage 3 chronic kidney disease (HCC) [N18.3]   • HTN (hypertension) [I10]   • Tobacco use [Z72.0]       Plan:  Infected ulcer of left leg   Infected venous stasis ulcer  Afebrile  No leukocytosis  Wound culture grows  Pseudomonas, MRSA, E faecalis and Klebsiella  C ontinue IV Zosyn and zyvox for now  Wound vac placed  LPS consulting.  -can change to PO quinolone at discharge with Zyvox  Stop date 1/2/2019    Diarrhea  DC'd stool softeners on 12/24  If persists, check C. difficile PCR    Chronic hepatitis C without hepatic coma   In process of workup from GI Dr [facility] for treatment as outpatient    Lung nodule  Concern for underlying malignancy.  Biopsy if feasible  PET scan planned      Chronic kidney disease   Maintaining hydration  Avoiding nephrotoxics: NSAIDs etc    Dispo: TBD

## 2018-12-26 ENCOUNTER — HOSPITAL ENCOUNTER (OUTPATIENT)
Dept: RADIOLOGY | Facility: MEDICAL CENTER | Age: 63
End: 2018-12-26
Attending: INTERNAL MEDICINE
Payer: MEDICAID

## 2018-12-26 LAB
ANION GAP SERPL CALC-SCNC: 5 MMOL/L (ref 0–11.9)
BASOPHILS # BLD AUTO: 1.1 % (ref 0–1.8)
BASOPHILS # BLD: 0.07 K/UL (ref 0–0.12)
BUN SERPL-MCNC: 27 MG/DL (ref 8–22)
CALCIUM SERPL-MCNC: 9.5 MG/DL (ref 8.5–10.5)
CHLORIDE SERPL-SCNC: 107 MMOL/L (ref 96–112)
CO2 SERPL-SCNC: 23 MMOL/L (ref 20–33)
CREAT SERPL-MCNC: 1.35 MG/DL (ref 0.5–1.4)
EOSINOPHIL # BLD AUTO: 0.35 K/UL (ref 0–0.51)
EOSINOPHIL NFR BLD: 5.3 % (ref 0–6.9)
ERYTHROCYTE [DISTWIDTH] IN BLOOD BY AUTOMATED COUNT: 58.8 FL (ref 35.9–50)
GLUCOSE SERPL-MCNC: 89 MG/DL (ref 65–99)
HCT VFR BLD AUTO: 42.1 % (ref 42–52)
HGB BLD-MCNC: 13.6 G/DL (ref 14–18)
IMM GRANULOCYTES # BLD AUTO: 0.01 K/UL (ref 0–0.11)
IMM GRANULOCYTES NFR BLD AUTO: 0.2 % (ref 0–0.9)
LYMPHOCYTES # BLD AUTO: 1.69 K/UL (ref 1–4.8)
LYMPHOCYTES NFR BLD: 25.7 % (ref 22–41)
MCH RBC QN AUTO: 31.3 PG (ref 27–33)
MCHC RBC AUTO-ENTMCNC: 32.3 G/DL (ref 33.7–35.3)
MCV RBC AUTO: 97 FL (ref 81.4–97.8)
MONOCYTES # BLD AUTO: 0.52 K/UL (ref 0–0.85)
MONOCYTES NFR BLD AUTO: 7.9 % (ref 0–13.4)
NEUTROPHILS # BLD AUTO: 3.93 K/UL (ref 1.82–7.42)
NEUTROPHILS NFR BLD: 59.8 % (ref 44–72)
NRBC # BLD AUTO: 0 K/UL
NRBC BLD-RTO: 0 /100 WBC
PLATELET # BLD AUTO: 217 K/UL (ref 164–446)
PMV BLD AUTO: 9.1 FL (ref 9–12.9)
POTASSIUM SERPL-SCNC: 4.7 MMOL/L (ref 3.6–5.5)
RBC # BLD AUTO: 4.34 M/UL (ref 4.7–6.1)
SODIUM SERPL-SCNC: 135 MMOL/L (ref 135–145)
WBC # BLD AUTO: 6.6 K/UL (ref 4.8–10.8)

## 2018-12-26 PROCEDURE — A9270 NON-COVERED ITEM OR SERVICE: HCPCS | Performed by: INTERNAL MEDICINE

## 2018-12-26 PROCEDURE — 770021 HCHG ROOM/CARE - ISO PRIVATE

## 2018-12-26 PROCEDURE — A9270 NON-COVERED ITEM OR SERVICE: HCPCS | Performed by: FAMILY MEDICINE

## 2018-12-26 PROCEDURE — 700111 HCHG RX REV CODE 636 W/ 250 OVERRIDE (IP): Performed by: INTERNAL MEDICINE

## 2018-12-26 PROCEDURE — 80048 BASIC METABOLIC PNL TOTAL CA: CPT

## 2018-12-26 PROCEDURE — 700105 HCHG RX REV CODE 258: Performed by: INTERNAL MEDICINE

## 2018-12-26 PROCEDURE — 700102 HCHG RX REV CODE 250 W/ 637 OVERRIDE(OP): Performed by: FAMILY MEDICINE

## 2018-12-26 PROCEDURE — 36415 COLL VENOUS BLD VENIPUNCTURE: CPT

## 2018-12-26 PROCEDURE — 700102 HCHG RX REV CODE 250 W/ 637 OVERRIDE(OP): Performed by: INTERNAL MEDICINE

## 2018-12-26 PROCEDURE — 85025 COMPLETE CBC W/AUTO DIFF WBC: CPT

## 2018-12-26 PROCEDURE — 99232 SBSQ HOSP IP/OBS MODERATE 35: CPT | Performed by: FAMILY MEDICINE

## 2018-12-26 PROCEDURE — 99232 SBSQ HOSP IP/OBS MODERATE 35: CPT | Performed by: INTERNAL MEDICINE

## 2018-12-26 PROCEDURE — 306263 VAC CANNISTER W/GEL 500ML: Performed by: FAMILY MEDICINE

## 2018-12-26 PROCEDURE — 97605 NEG PRS WND THER DME<=50SQCM: CPT

## 2018-12-26 RX ORDER — LINEZOLID 600 MG/1
600 TABLET, FILM COATED ORAL EVERY 12 HOURS
Status: DISCONTINUED | OUTPATIENT
Start: 2018-12-26 | End: 2018-12-28 | Stop reason: HOSPADM

## 2018-12-26 RX ADMIN — PIPERACILLIN AND TAZOBACTAM 4.5 G: 4; .5 INJECTION, POWDER, LYOPHILIZED, FOR SOLUTION INTRAVENOUS; PARENTERAL at 05:34

## 2018-12-26 RX ADMIN — OXYCODONE HYDROCHLORIDE 10 MG: 10 TABLET ORAL at 05:34

## 2018-12-26 RX ADMIN — PIPERACILLIN AND TAZOBACTAM 4.5 G: 4; .5 INJECTION, POWDER, LYOPHILIZED, FOR SOLUTION INTRAVENOUS; PARENTERAL at 22:41

## 2018-12-26 RX ADMIN — SODIUM CHLORIDE: 9 INJECTION, SOLUTION INTRAVENOUS at 12:02

## 2018-12-26 RX ADMIN — OXYCODONE HYDROCHLORIDE 10 MG: 10 TABLET ORAL at 09:55

## 2018-12-26 RX ADMIN — HEPARIN SODIUM 5000 UNITS: 5000 INJECTION, SOLUTION INTRAVENOUS; SUBCUTANEOUS at 22:40

## 2018-12-26 RX ADMIN — OXYCODONE HYDROCHLORIDE 10 MG: 10 TABLET ORAL at 22:41

## 2018-12-26 RX ADMIN — HEPARIN SODIUM 5000 UNITS: 5000 INJECTION, SOLUTION INTRAVENOUS; SUBCUTANEOUS at 05:34

## 2018-12-26 RX ADMIN — OXYCODONE HYDROCHLORIDE 10 MG: 10 TABLET ORAL at 14:17

## 2018-12-26 RX ADMIN — HEPARIN SODIUM 5000 UNITS: 5000 INJECTION, SOLUTION INTRAVENOUS; SUBCUTANEOUS at 14:17

## 2018-12-26 RX ADMIN — NICOTINE 14 MG: 14 PATCH, EXTENDED RELEASE TRANSDERMAL at 05:35

## 2018-12-26 RX ADMIN — PIPERACILLIN AND TAZOBACTAM 4.5 G: 4; .5 INJECTION, POWDER, LYOPHILIZED, FOR SOLUTION INTRAVENOUS; PARENTERAL at 14:18

## 2018-12-26 RX ADMIN — LINEZOLID 600 MG: 600 TABLET, FILM COATED ORAL at 05:33

## 2018-12-26 RX ADMIN — OXYCODONE HYDROCHLORIDE 10 MG: 10 TABLET ORAL at 18:28

## 2018-12-26 RX ADMIN — AMLODIPINE BESYLATE 10 MG: 10 TABLET ORAL at 05:34

## 2018-12-26 RX ADMIN — LINEZOLID 600 MG: 600 TABLET, FILM COATED ORAL at 18:04

## 2018-12-26 RX ADMIN — PROPRANOLOL HYDROCHLORIDE 10 MG: 10 TABLET ORAL at 05:33

## 2018-12-26 ASSESSMENT — ENCOUNTER SYMPTOMS
ABDOMINAL PAIN: 0
NERVOUS/ANXIOUS: 0
DIARRHEA: 0
HEARTBURN: 0
PALPITATIONS: 0
HEMOPTYSIS: 0
TINGLING: 0
DEPRESSION: 0
NAUSEA: 0
NECK PAIN: 0
EYE PAIN: 0
HEADACHES: 0
SPUTUM PRODUCTION: 0
DOUBLE VISION: 0
COUGH: 0
CHILLS: 0
DIARRHEA: 1
FEVER: 0
DIZZINESS: 0
BLURRED VISION: 0
CLAUDICATION: 0
INSOMNIA: 0
ORTHOPNEA: 0
PHOTOPHOBIA: 0
MYALGIAS: 0
VOMITING: 0
WEIGHT LOSS: 0

## 2018-12-26 ASSESSMENT — PAIN SCALES - GENERAL
PAINLEVEL_OUTOF10: 5
PAINLEVEL_OUTOF10: 4
PAINLEVEL_OUTOF10: 5
PAINLEVEL_OUTOF10: 2
PAINLEVEL_OUTOF10: 4

## 2018-12-26 NOTE — PROGRESS NOTES
Received report from [facility], assumed care of pt 0700.  Pt is A&Ox4, PIV to RUE patent, fluids infusing, dressing CDI.  Wound vac to left posterior lower extremity.   Pt was medicated for pain. Pt on contact iso for MRSA and klebsiella.  Pt is up with SBA to assist with wound vac and IV pole, steady gait.   All needs met at this time.

## 2018-12-26 NOTE — DISCHARGE PLANNING
LSW received call from Evelyn w/ [facility] on pt's wound vac.  Wound vac has been approv'd Medicaid.  GRACIELAI waiting on DC date.  SW will discuss with MD for DC date.

## 2018-12-26 NOTE — PROGRESS NOTES
Received shift report from day shift RN. Pt is AO4 and reports pain in his LLE, rest, repositioned, and medicated per MAR. Denies any N/V. Wound vac in place. Discussed POC. Assessed and administered evening medications. Bed in lowest position, call light and personal belongings within reach, educated to call if in need of assistance, non skid socks, all needs met at this time.

## 2018-12-26 NOTE — CARE PLAN
Problem: Communication  Goal: The ability to communicate needs accurately and effectively will improve  Outcome: PROGRESSING AS EXPECTED  Pt involved in POC. Addressed questions/concerns    Problem: Pain Management  Goal: Pain level will decrease to patient's comfort goal  Outcome: PROGRESSING AS EXPECTED  Rest, repositioned, and medicated per MAR

## 2018-12-27 DIAGNOSIS — R91.1 LUNG NODULE: Primary | ICD-10-CM

## 2018-12-27 LAB
ANION GAP SERPL CALC-SCNC: 8 MMOL/L (ref 0–11.9)
BASOPHILS # BLD AUTO: 1.2 % (ref 0–1.8)
BASOPHILS # BLD: 0.08 K/UL (ref 0–0.12)
BUN SERPL-MCNC: 29 MG/DL (ref 8–22)
CALCIUM SERPL-MCNC: 9.4 MG/DL (ref 8.5–10.5)
CHLORIDE SERPL-SCNC: 106 MMOL/L (ref 96–112)
CO2 SERPL-SCNC: 24 MMOL/L (ref 20–33)
CREAT SERPL-MCNC: 1.25 MG/DL (ref 0.5–1.4)
EOSINOPHIL # BLD AUTO: 0.41 K/UL (ref 0–0.51)
EOSINOPHIL NFR BLD: 6 % (ref 0–6.9)
ERYTHROCYTE [DISTWIDTH] IN BLOOD BY AUTOMATED COUNT: 56.9 FL (ref 35.9–50)
GLUCOSE SERPL-MCNC: 98 MG/DL (ref 65–99)
HCT VFR BLD AUTO: 43.4 % (ref 42–52)
HGB BLD-MCNC: 14.1 G/DL (ref 14–18)
IMM GRANULOCYTES # BLD AUTO: 0.01 K/UL (ref 0–0.11)
IMM GRANULOCYTES NFR BLD AUTO: 0.1 % (ref 0–0.9)
LYMPHOCYTES # BLD AUTO: 1.68 K/UL (ref 1–4.8)
LYMPHOCYTES NFR BLD: 24.4 % (ref 22–41)
MCH RBC QN AUTO: 31.2 PG (ref 27–33)
MCHC RBC AUTO-ENTMCNC: 32.5 G/DL (ref 33.7–35.3)
MCV RBC AUTO: 96 FL (ref 81.4–97.8)
MONOCYTES # BLD AUTO: 0.48 K/UL (ref 0–0.85)
MONOCYTES NFR BLD AUTO: 7 % (ref 0–13.4)
NEUTROPHILS # BLD AUTO: 4.22 K/UL (ref 1.82–7.42)
NEUTROPHILS NFR BLD: 61.3 % (ref 44–72)
NRBC # BLD AUTO: 0 K/UL
NRBC BLD-RTO: 0 /100 WBC
PLATELET # BLD AUTO: 223 K/UL (ref 164–446)
PMV BLD AUTO: 9.1 FL (ref 9–12.9)
POTASSIUM SERPL-SCNC: 4.6 MMOL/L (ref 3.6–5.5)
RBC # BLD AUTO: 4.52 M/UL (ref 4.7–6.1)
SODIUM SERPL-SCNC: 138 MMOL/L (ref 135–145)
WBC # BLD AUTO: 6.9 K/UL (ref 4.8–10.8)

## 2018-12-27 PROCEDURE — 700102 HCHG RX REV CODE 250 W/ 637 OVERRIDE(OP): Performed by: INTERNAL MEDICINE

## 2018-12-27 PROCEDURE — A9270 NON-COVERED ITEM OR SERVICE: HCPCS | Performed by: INTERNAL MEDICINE

## 2018-12-27 PROCEDURE — 85025 COMPLETE CBC W/AUTO DIFF WBC: CPT

## 2018-12-27 PROCEDURE — 770021 HCHG ROOM/CARE - ISO PRIVATE

## 2018-12-27 PROCEDURE — 99232 SBSQ HOSP IP/OBS MODERATE 35: CPT | Performed by: FAMILY MEDICINE

## 2018-12-27 PROCEDURE — 700111 HCHG RX REV CODE 636 W/ 250 OVERRIDE (IP): Performed by: INTERNAL MEDICINE

## 2018-12-27 PROCEDURE — A9270 NON-COVERED ITEM OR SERVICE: HCPCS | Performed by: FAMILY MEDICINE

## 2018-12-27 PROCEDURE — 80048 BASIC METABOLIC PNL TOTAL CA: CPT

## 2018-12-27 PROCEDURE — 700105 HCHG RX REV CODE 258: Performed by: INTERNAL MEDICINE

## 2018-12-27 PROCEDURE — 36415 COLL VENOUS BLD VENIPUNCTURE: CPT

## 2018-12-27 PROCEDURE — 700102 HCHG RX REV CODE 250 W/ 637 OVERRIDE(OP): Performed by: FAMILY MEDICINE

## 2018-12-27 PROCEDURE — 99232 SBSQ HOSP IP/OBS MODERATE 35: CPT | Performed by: INTERNAL MEDICINE

## 2018-12-27 RX ORDER — CIPROFLOXACIN 500 MG/1
500 TABLET, FILM COATED ORAL EVERY 12 HOURS
Qty: 12 TAB | Refills: 0 | Status: SHIPPED | OUTPATIENT
Start: 2018-12-27 | End: 2018-12-27

## 2018-12-27 RX ORDER — AMLODIPINE BESYLATE 10 MG/1
10 TABLET ORAL DAILY
Qty: 30 TAB | Refills: 0 | Status: SHIPPED | OUTPATIENT
Start: 2018-12-28 | End: 2019-01-23 | Stop reason: SDUPTHER

## 2018-12-27 RX ORDER — CIPROFLOXACIN 500 MG/1
500 TABLET, FILM COATED ORAL EVERY 12 HOURS
Status: DISCONTINUED | OUTPATIENT
Start: 2018-12-27 | End: 2018-12-28 | Stop reason: HOSPADM

## 2018-12-27 RX ORDER — CIPROFLOXACIN 500 MG/1
500 TABLET, FILM COATED ORAL EVERY 12 HOURS
Status: DISCONTINUED | OUTPATIENT
Start: 2018-12-27 | End: 2018-12-27

## 2018-12-27 RX ORDER — OXYCODONE HYDROCHLORIDE 5 MG/1
5 TABLET ORAL EVERY 4 HOURS PRN
Qty: 18 TAB | Refills: 0 | Status: SHIPPED | OUTPATIENT
Start: 2018-12-27 | End: 2018-12-30

## 2018-12-27 RX ORDER — LINEZOLID 600 MG/1
600 TABLET, FILM COATED ORAL EVERY 12 HOURS
Qty: 12 TAB | Refills: 0 | Status: SHIPPED | OUTPATIENT
Start: 2018-12-27 | End: 2018-12-27

## 2018-12-27 RX ORDER — CIPROFLOXACIN 500 MG/1
500 TABLET, FILM COATED ORAL EVERY 12 HOURS
Qty: 12 TAB | Refills: 0 | Status: SHIPPED | OUTPATIENT
Start: 2018-12-27 | End: 2019-01-02

## 2018-12-27 RX ORDER — LINEZOLID 600 MG/1
600 TABLET, FILM COATED ORAL EVERY 12 HOURS
Qty: 12 TAB | Refills: 0 | Status: SHIPPED | OUTPATIENT
Start: 2018-12-27 | End: 2019-01-02

## 2018-12-27 RX ADMIN — HEPARIN SODIUM 5000 UNITS: 5000 INJECTION, SOLUTION INTRAVENOUS; SUBCUTANEOUS at 06:03

## 2018-12-27 RX ADMIN — CIPROFLOXACIN HYDROCHLORIDE 500 MG: 500 TABLET, FILM COATED ORAL at 08:42

## 2018-12-27 RX ADMIN — SODIUM CHLORIDE: 9 INJECTION, SOLUTION INTRAVENOUS at 14:34

## 2018-12-27 RX ADMIN — LINEZOLID 600 MG: 600 TABLET, FILM COATED ORAL at 17:39

## 2018-12-27 RX ADMIN — NICOTINE 14 MG: 14 PATCH, EXTENDED RELEASE TRANSDERMAL at 06:03

## 2018-12-27 RX ADMIN — HEPARIN SODIUM 5000 UNITS: 5000 INJECTION, SOLUTION INTRAVENOUS; SUBCUTANEOUS at 21:15

## 2018-12-27 RX ADMIN — AMLODIPINE BESYLATE 10 MG: 10 TABLET ORAL at 06:04

## 2018-12-27 RX ADMIN — PROPRANOLOL HYDROCHLORIDE 10 MG: 10 TABLET ORAL at 06:04

## 2018-12-27 RX ADMIN — PIPERACILLIN AND TAZOBACTAM 4.5 G: 4; .5 INJECTION, POWDER, LYOPHILIZED, FOR SOLUTION INTRAVENOUS; PARENTERAL at 06:03

## 2018-12-27 RX ADMIN — LINEZOLID 600 MG: 600 TABLET, FILM COATED ORAL at 06:04

## 2018-12-27 RX ADMIN — HEPARIN SODIUM 5000 UNITS: 5000 INJECTION, SOLUTION INTRAVENOUS; SUBCUTANEOUS at 14:34

## 2018-12-27 RX ADMIN — CIPROFLOXACIN HYDROCHLORIDE 500 MG: 500 TABLET, FILM COATED ORAL at 17:35

## 2018-12-27 RX ADMIN — OXYCODONE HYDROCHLORIDE 10 MG: 10 TABLET ORAL at 15:54

## 2018-12-27 RX ADMIN — OXYCODONE HYDROCHLORIDE 10 MG: 10 TABLET ORAL at 06:04

## 2018-12-27 RX ADMIN — OXYCODONE HYDROCHLORIDE 10 MG: 10 TABLET ORAL at 11:40

## 2018-12-27 ASSESSMENT — ENCOUNTER SYMPTOMS
MYALGIAS: 1
PALPITATIONS: 0
PHOTOPHOBIA: 0
TINGLING: 0
SPUTUM PRODUCTION: 0
DIZZINESS: 0
HEMOPTYSIS: 0
CLAUDICATION: 0
HEARTBURN: 0
BLURRED VISION: 0
CHILLS: 0
EYE PAIN: 0
DOUBLE VISION: 0
FEVER: 0
TREMORS: 0
ABDOMINAL PAIN: 0
NECK PAIN: 0
BACK PAIN: 0
NAUSEA: 0
DEPRESSION: 0
VOMITING: 0
ORTHOPNEA: 0
COUGH: 0
DIARRHEA: 0
HEADACHES: 0

## 2018-12-27 ASSESSMENT — PAIN SCALES - GENERAL
PAINLEVEL_OUTOF10: 3
PAINLEVEL_OUTOF10: 4
PAINLEVEL_OUTOF10: 3
PAINLEVEL_OUTOF10: 4

## 2018-12-27 ASSESSMENT — LIFESTYLE VARIABLES: SUBSTANCE_ABUSE: 0

## 2018-12-27 NOTE — CARE PLAN
Problem: Pain Management  Goal: Pain level will decrease to patient's comfort goal    Intervention: Follow pain managment plan developed in collaboration with patient and Interdisciplinary Team  Pt medicated per MAR, oxy 10 approximately q4 h. Pt states this regimen is working for him and able to get his pain down to 2/10 on pain scale.

## 2018-12-27 NOTE — DISCHARGE PLANNING
LSW met with dtr. & pt.  Regarding HHC order.  They live in single wide & they prefer to go outpt. Wound care Clinic as being done prior.  SW will set up to resume at [facility].  LSW in process setting up MTM transportation.    Care Transition Team Assessment    Information Source  Orientation : Oriented x 4  Information Given By: Patient  Who is responsible for making decisions for patient? : Patient    Readmission Evaluation  Is this a readmission?: No    Elopement Risk  Legal Hold: No  Ambulatory or Self Mobile in Wheelchair: Yes  Disoriented: No  Psychiatric Symptoms: None  History of Wandering: No  Elopement this Admit: No  Vocalizing Wanting to Leave: No  Displays Behaviors, Body Language Wanting to Leave: No-Not at Risk for Elopement  Elopement Risk: Not at Risk for Elopement    Interdisciplinary Discharge Planning  Does Admitting Nurse Feel This Could be a Complex Discharge?: Yes  Primary Care Physician: yes (Dr. OSMIN Ch)  Lives with - Patient's Self Care Capacity: Adult Children  Patient or legal guardian wants to designate a caregiver (see row info): No  Support Systems: Family Member(s)  Housing / Facility: Mobile Home    Discharge Preparedness  What is your plan after discharge?: Home with help, Home health care  What are your discharge supports?: Child  Prior Functional Level: Ambulatory, Independent with Activities of Daily Living  Difficulity with ADLs: Walking (r/t wound vac)  Difficulity with IADLs: Driving    Functional Assesment  Prior Functional Level: Ambulatory, Independent with Activities of Daily Living    Finances  Financial Barriers to Discharge: No  Prescription Coverage: No    Vision / Hearing Impairment  Vision Impairment : Yes  Right Eye Vision: Impaired, Wears Glasses (for reading)  Left Eye Vision: Impaired, Wears Glasses (for reading)  Hearing Impairment : No    Values / Beliefs / Concerns  Values / Beliefs Concerns : No    Advance Directive  Advance Directive?:  None  Advance Directive offered?: AD Booklet refused    Domestic Abuse  Have you ever been the victim of abuse or violence?: No  Physical Abuse or Sexual Abuse: No  Verbal Abuse or Emotional Abuse: No  Possible Abuse Reported to:: Not Applicable    Psychological Assessment  History of Substance Abuse: Alcohol  Date Last Used - Alcohol: recent  History of Psychiatric Problems: No  Non-compliant with Treatment: No  Newly Diagnosed Illness: No    Discharge Risks or Barriers  Discharge risks or barriers?: Transportation  Patient risk factors: Readmission    Anticipated Discharge Information  Anticipated discharge disposition: [facility] [place] (outpatient)  Discharge Address: [address] [place] [address] Luigi Tian, [address]  Discharge Contact Phone Number: [phone]

## 2018-12-27 NOTE — CARE PLAN
Problem: Communication  Goal: The ability to communicate needs accurately and effectively will improve  Outcome: PROGRESSING AS EXPECTED  Pt involved in POC. Addressed questions/concerns    Problem: Fluid Volume:  Goal: Will maintain balanced intake and output  Outcome: PROGRESSING AS EXPECTED  IV maintenance and encouraged po intake

## 2018-12-27 NOTE — PROGRESS NOTES
Received report from EPHRAIM Shaikh, assumed care of pt 0700.  PIV infusing IV ABX, dressing CDI.  Wound vac to left LE, no output in canister, vac is functioning as can see scant output moving through tubing, machine on and running.  Pt states pain well controlled with oxy 10 q4. Pt up to bathroom with SBA.   All needs met at this time, call light within reach. VSS.

## 2018-12-27 NOTE — DISCHARGE PLANNING
LSW waiting on Wound care to see if pt. Can be seen on Monday instead of next Weds at 3:30 for wound vac change.  They are staffing with manager.

## 2018-12-27 NOTE — DISCHARGE PLANNING
Anticipated Discharge Disposition: Home/HHC    Action: Reviewed chart.  [facility] has obtained PA for Wound Vac.  Per Rose BAKERW, they will deliver Wound Vac today to bedside.   Discussed discharge POC with Dr. Mason, advised of above information.  Requested HHC order be placed, and prescriptions for needed PO antibiotics: Zyvox and Quinolone be provided.  This RN CM will send to [facility] for potential copay or PAR r/t Zyvox and Quinolone.     Barriers to Discharge: none    Plan: ALICIA Rose to assist pt in setting up MTM for transportation benefits, and establishing HHC.  This RN CM will obtain needed information r/t potential high cost medications.

## 2018-12-27 NOTE — FACE TO FACE
Face to Face Supporting Documentation - Home Health    The encounter with this patient was in whole or in part the primary reason for home health admission.    Date of encounter:   Patient:                    MRN:                       YOB: 2018  Goran Chavez  [id number]  1955     Home health to see patient for:  Skilled Nursing care for assessment, interventions & education, Wound Care, Physical Therapy evaluation and treatment and Occupational therapy evaluation and treatment    Skilled need for:  Comment: PT/OT/Skilled/Wound care.     Skilled nursing interventions to include:  Comment: PT/OT/Skilled/Wound care.     Homebound status evidenced by:  Need the aid of supportive devices such as crutches, canes, wheelchairs or walkers. Leaving home requires a considerable and taxing effort. There is a normal inability to leave the home.    Community Physician to provide follow up care: Marcela Ch M.D.     Optional Interventions? No      I certify the face to face encounter for this home health care referral meets the CMS requirements and the encounter/clinical assessment with the patient was, in whole, or in part, for the medical condition(s) listed above, which is the primary reason for home health care. Based on my clinical findings: the service(s) are medically necessary, support the need for home health care, and the homebound criteria are met.  I certify that this patient has had a face to face encounter by myself.  Dallas Mason M.D. - NPI: [id number]

## 2018-12-27 NOTE — DISCHARGE PLANNING
Anticipated Discharge Disposition: Home/Wound Care Clinic     Action: Received return PC from Winnie at [facility].  Sts Zyvox has no copay, and 4 doses available today, with remainder of medication available tomorrow.      Barriers to Discharge: Wound Care at outpt clinic.     Plan: Zyvox available at [facility], with no copay or PAR identified.

## 2018-12-27 NOTE — PROGRESS NOTES
Received shift report from day shift RN. Pt is AO4 and reports mild pain on his LLE, rest repositioned, and medicated per MAR. Wound Vac to suction on LLE. Bed in lowest position, call light and personal belongings within reach, educated to call if in need of assistance, non skid socks, all needs met at this time.

## 2018-12-27 NOTE — PROGRESS NOTES
Infectious Disease Progress Note    Author: Karen Hemphill M.D. Date & Time of service: 2018  10:56 AM    Chief Complaint:  Infected left leg ulcer    Interval History:  63 y.o. male admitted 2018 with non-healing leg ulcer and found to have polymicrobial wound   AF WBC 5.6 tolerating abx well-VAC placed   AF tolerating abx well-no complaints   AF WBC 5.9 no new complaints   afebrile WBC 6.8 patient states has been VAC change was performed this morning, he denies any leg pain, tolerating antibiotics without any issues, had 3 episodes of soft stools   afebrile WBC 6 patient reports no issues overnight, he also had no episodes of diarrhea   afebrile WBC 6.6 patient is resting comfortably and denies any leg pain, he was very happy with his [place] dinner, he had 2 watery stools yesterday but denies any abdominal pain  1226 afebrile WBC 6.9 no new issues to report, wound care notes reviewed from yesterday with granulation tissue and no signs of infection  Labs Reviewed, Medications Reviewed, Radiology Reviewed and Wound Reviewed.    Review of Systems:  Review of Systems   Constitutional: Negative for chills and fever.   Cardiovascular: Negative for chest pain.   Gastrointestinal: Negative for abdominal pain, diarrhea, nausea and vomiting.   Skin: Negative for rash.   All other systems reviewed and are negative.      Hemodynamics:  Temp (24hrs), Av °C (98.6 °F), Min:36.7 °C (98 °F), Max:37.2 °C (98.9 °F)  Temperature: 37.1 °C (98.8 °F)  Pulse  Av.5  Min: 46  Max: 95  Blood Pressure: 134/83       Physical Exam:  Physical Exam   Constitutional: He is oriented to person, place, and time. He appears well-developed. No distress.   disheveled   HENT:   Head: Normocephalic and atraumatic.   Eyes: Pupils are equal, round, and reactive to light. EOM are normal. No scleral icterus.   Neck: Neck supple.   Cardiovascular: Normal rate.    No murmur heard.  Pulmonary/Chest:  Breath sounds normal. No respiratory distress. He has no wheezes.   Abdominal: Soft. There is no rebound and no guarding.   Musculoskeletal: He exhibits no edema.   VAC LLE-no surrounding erythema or edema   Neurological: He is alert and oriented to person, place, and time.   Skin: He is not diaphoretic.   tattoos   Nursing note and vitals reviewed.      Meds:    Current Facility-Administered Medications:   •  ciprofloxacin  •  linezolid  •  amLODIPine  •  hydrALAZINE  •  oxyCODONE immediate-release **OR** oxyCODONE immediate-release  •  heparin  •  Respiratory Care per Protocol  •  NS  •  acetaminophen  •  ondansetron  •  ondansetron  •  promethazine  •  promethazine  •  prochlorperazine  •  nicotine **AND** Nicotine Replacement Patient Education Materials **AND** nicotine polacrilex  •  propranolol    Labs:  Recent Labs      12/25/18 0009 12/26/18 0012 12/27/18 0016   WBC  6.0  6.6  6.9   RBC  4.40*  4.34*  4.52*   HEMOGLOBIN  13.7*  13.6*  14.1   HEMATOCRIT  41.5*  42.1  43.4   MCV  94.3  97.0  96.0   MCH  31.1  31.3  31.2   RDW  56.0*  58.8*  56.9*   PLATELETCT  227  217  223   MPV  9.0  9.1  9.1   NEUTSPOLYS  56.70  59.80  61.30   LYMPHOCYTES  29.80  25.70  24.40   MONOCYTES  7.70  7.90  7.00   EOSINOPHILS  4.70  5.30  6.00   BASOPHILS  0.80  1.10  1.20     Recent Labs      12/25/18 0009 12/26/18 0012  12/27/18   0016   SODIUM  138  135  138   POTASSIUM  4.7  4.7  4.6   CHLORIDE  109  107  106   CO2  23  23  24   GLUCOSE  90  89  98   BUN  28*  27*  29*     Recent Labs      12/25/18 0009 12/26/18 0012  12/27/18   0016   CREATININE  1.20  1.35  1.25       Imaging:  Ct-chest (thorax) With    Result Date: 12/20/2018 12/20/2018 7:01 PM HISTORY/REASON FOR EXAM: Smoker. Evaluate for malignancy. 30 pack-year smoking history. Spiculated nodule on prior plain film TECHNIQUE/EXAM DESCRIPTION: CT scan of the chest with contrast. Helical images were obtained from the lung apices through the adrenal  glands following the bolus administration of  70 mL of Omnipaque 350 nonionic contrast. Sagittal and coronal reconstructions were performed. Low dose optimization technique was utilized for this CT exam including automated exposure control and adjustment of the mA and/or kV according to patient size. COMPARISON:  Plain film of the chest 12/5/2018. FINDINGS: Atherosclerotic plaque is seen in the aorta. Ascending aorta measures 4 cm in diameter. The heart is enlarged. No pericardial or pleural effusion is identified. No filling defects are seen within the large central pulmonary arteries. There are small mediastinal lymph nodes. There is no hilar lymphadenopathy. There are multiple small axillary lymph nodes bilaterally. There is mild left apical scarring. There is an ill-defined opacity at the in the right upper lobe with adjacent linear opacity. The nodular component measures 1 x 0.95 cm. There is bibasilar atelectasis. No pneumothorax is identified. Central airways are patent. Limited views were obtained of the upper abdomen. There is renal cortical scarring involving the kidneys bilaterally. Mild degenerative changes are seen in the spine. There are postsurgical and old posttraumatic changes of the proximal left humerus.     Ill-defined right upper lobe nodular opacity with surrounding linear opacities. This could be related to scarring or malignancy. Further evaluation with PET/CT is recommended. Bibasilar atelectasis. Cardiomegaly. Bilateral renal cortical scarring. Dilated ascending aorta measuring 4 cm.    Dx-chest-2 Views    Result Date: 12/5/2018 12/5/2018 9:09 AM HISTORY/REASON FOR EXAM:  Abnormal ultrasound.  Smoking history.  Hepatitis C and anemia. TECHNIQUE/EXAM DESCRIPTION AND NUMBER OF VIEWS: Two views of the chest. COMPARISON:  None available. FINDINGS: Cardiomediastinal contour is within normal limits. Nodular densities project over both lower lungs, likely nipple shadows. Small apparent spiculated  "nodular density projects over the RIGHT lung apex measuring approximately 9 mm.  Not demonstrated on lateral view. No pleural fluid collection or pneumothorax. No major bony abnormality is seen.     1.  No pneumonia or pneumothorax. 2.  Possible spiculated nodule at the RIGHT lung apex measuring 9 mm.  Recommend further evaluation with CT chest. 3.  Probable bibasilar nipple shadows.    Dx-foot-complete 3+ Left    Result Date: 12/19/2018 12/19/2018 12:52 AM HISTORY/REASON FOR EXAM:  Left foot pain without trauma TECHNIQUE/EXAM DESCRIPTION:  AP, lateral, and oblique views of the LEFT foot. COMPARISON:  None. FINDINGS: No acute fracture is seen. There is severe pes planus deformity identified with bony remodeling of the midfoot bony structures.     1.  No acute traumatic bony injury. 2.  Severe pes planus deformity with remodeling of the midfoot bony structures. Appearance suggests Charcot joints.    Dx-tibia And Fibula Left    Result Date: 12/19/2018 12/19/2018 12:52 AM HISTORY/REASON FOR EXAM:  Atraumatic leg pain. TECHNIQUE/EXAM DESCRIPTION:  AP and lateral views of the LEFT tibia and fibula. COMPARISON:  None. FINDINGS: There is no visualized acute fracture. There is severe pes planus deformity identified with extensive degenerative changes and bony remodeling of the midfoot.     1.  No acute traumatic bony injury. 2.  Severe pes planus deformity with extensive degenerative changes and bony remodeling of the midfoot.      Micro:  Results     Procedure Component Value Units Date/Time    BLOOD CULTURE x2 [[id number]] Collected:  12/19/18 0031    Order Status:  Completed Specimen:  Blood from Peripheral Updated:  12/24/18 0300     Significant Indicator NEG     Source BLD     Site PERIPHERAL     Blood Culture No growth after 5 days of incubation.    Narrative:       Per Hospital Policy: Only change Specimen Src: to \"Line\" if  specified by physician order.    BLOOD CULTURE x2 [[id number]] Collected:  12/19/18 0006    " "Order Status:  Completed Specimen:  Blood from Peripheral Updated:  12/24/18 0300     Significant Indicator NEG     Source BLD     Site PERIPHERAL     Blood Culture No growth after 5 days of incubation.    Narrative:       Per Hospital Policy: Only change Specimen Src: to \"Line\" if  specified by physician order.      Culture & Susceptibility     ENTEROCOCCUS FAECALIS     Antibiotic Sensitivity Microscan Unit Status   Ampicillin Sensitive <=2 mcg/mL Final   Daptomycin Sensitive 2 mcg/mL Final   Gent Synergy Resistant >500 mcg/mL Final   Penicillin Sensitive 2 mcg/mL Final   Strep Synergy Sensitive <=1000 mcg/mL Final   Vancomycin Sensitive 2 mcg/mL Final         KLEBSIELLA OXYTOCA     Antibiotic Sensitivity Microscan Unit Status   Ampicillin Intermediate 16 mcg/mL Final   Cefepime Sensitive <=8 mcg/mL Final   Cefotaxime Sensitive <=2 mcg/mL Final   Cefotetan Sensitive <=16 mcg/mL Final   Ceftazidime Sensitive <=1 mcg/mL Final   Ceftriaxone Sensitive <=8 mcg/mL Final   Cefuroxime Sensitive <=4 mcg/mL Final   Ciprofloxacin Sensitive <=1 mcg/mL Final   Ertapenem Sensitive <=1 mcg/mL Final   Gentamicin Sensitive <=4 mcg/mL Final   Pip/Tazobactam Sensitive <=16 mcg/mL Final   Tigecycline Sensitive <=2 mcg/mL Final   Tobramycin Sensitive <=4 mcg/mL Final   Trimeth/Sulfa Sensitive <=2/38 mcg/mL Final         METHICILLIN RESISTANT STAPHYLOCOCCUS AUREUS     Antibiotic Sensitivity Microscan Unit Status   Ampicillin/sulbactam Resistant 16/8 mcg/mL Final   Clindamycin Sensitive <=0.5 mcg/mL Final   Daptomycin Sensitive <=0.5 mcg/mL Final   Erythromycin Resistant >4 mcg/mL Final   Moxifloxacin Sensitive 1 mcg/mL Final   Oxacillin Resistant >2 mcg/mL Final   Penicillin Resistant >8 mcg/mL Final   Tetracycline Sensitive <=4 mcg/mL Final   Trimeth/Sulfa Sensitive <=0.5/9.5 mcg/mL Final   Vancomycin Sensitive 1 mcg/mL Final         PSEUDOMONAS AERUGINOSA     Antibiotic Sensitivity Microscan Unit Status   Amikacin Sensitive <=16 " mcg/mL Final   Cefepime Sensitive <=8 mcg/mL Final   Ceftazidime Sensitive 4 mcg/mL Final   Ciprofloxacin Sensitive <=1 mcg/mL Final   Gentamicin Sensitive <=4 mcg/mL Final   Imipenem Sensitive <=1 mcg/mL Final   Meropenem Sensitive <=1 mcg/mL Final   Pip/Tazobactam Sensitive <=16 mcg/mL Final   Tobramycin Sensitive <=4 mcg/mL Final                   Assessment:  Active Hospital Problems    Diagnosis   • Infected ulcer of skin (HCC) [L98.499, L08.9]   • Chronic hepatitis C without hepatic coma (HCC) [B18.2]   • Stage 3 chronic kidney disease (HCC) [N18.3]       Plan:  Infected ulcer of left leg, improved  Infected venous stasis ulcer  Afebrile  No leukocytosis  Wound culture grows  Pseudomonas, MRSA, E faecalis and Klebsiella  DC IV Zosyn   Transition to p.o. ciprofloxacin   Continue zyvox  Wound vac placed  LPS consulting.  -can change to PO quinolone at discharge with Zyvox  Stop date 1/2/2019    Diarrhea, stable  DC'd stool softeners on 12/24    Chronic hepatitis C without hepatic coma   In process of workup from GI Dr [facility] for treatment as outpatient    Lung nodule  Concern for underlying malignancy.  Biopsy if feasible  PET scan planned      Chronic kidney disease   Maintaining hydration  Avoiding nephrotoxics: NSAIDs etc    Please ensure patient can obtain p.o. linezolid prior to discharge.    No ID clinic follow-up needed    FANNY Mason. ID signing off

## 2018-12-27 NOTE — CARE PLAN
Problem: Infection  Goal: Will remain free from infection  Outcome: PROGRESSING AS EXPECTED  Pt remained afebrile. ABX administered as ordered. Wound vac.

## 2018-12-27 NOTE — PROGRESS NOTES
[facility] Medicine Daily Progress Note    Date of Service  12/26/2018    Chief Complaint  63 y.o. male admitted 12/18/2018 with non-healing leg ulcer and found to have polymicrobial wound culture and told to present to ED.    Hospital Course    Patient admitted and started on Vanco and zosyn.  LPS consulted and reviewed with surgery, no debridement planned at this time.  Wound vac in place and ID consulted for recommendations.      Interval Problem Update  12/20 Patient states he feels no pain and is doing okay.  He is concerned that he missed the appointments for imaging ordered by his GI physician, Dr Park for evaluation for concern of hepatocellular carcinoma given his history of hepatitis C.  As patient is a high risk for cancer, MRI and CT ordered inpatient for further evaluation.  12/21 Patient feeling well.  With wound vac in place, he cannot have MRI at this time, already received contrast for CT chest so will not have CT abdomen/pelvis with contrast at this time.  CT chest does show a mass in the RUL but it has been watched for years and is continuing to decrease in size and appearance is not typical of a malignancy.  Will order AFP for blood work today to further evaluate for HCC.  Coordination for wound vac on discharge in process as this is anticipated to take many days to do so.  12/22 Patient without significant changes overnight, states he feels well and is tolerating the infusions without issue.  His appetite is good along and normal urination and bowel movements.  12/23 Patient states he is feeling well, good appetite.  He is please to hear his A1C is not diabetic and the tumor marker for hepatocellular carcinoma is normal.  Patient to remain in hospital until wound vac can be coordinated for discharge.  12/24 Patient states he has no complaints.  Wound vac dressing was changed earlier today.  RN reported that patient has had loose stools, very low suspicion of c diff at this time.  12/25: Laying in  bed comfortably.  Wound VAC still draining white purulent blood-tinged discharges.  Pain is fairly controlled.  No acute distress noted.  12/26: Patient is laying in bed comfortably.  Wound VAC replaced today.  Pain is fairly controlled.  No new issues to report.  Consultants/Specialty  ID -Hemphill    Code Status  full    Disposition  Home once wound vac coordinated.    Review of Systems  Review of Systems   Constitutional: Negative for chills, fever and weight loss.   HENT: Negative for ear discharge, ear pain, hearing loss and tinnitus.    Eyes: Negative for blurred vision, double vision, photophobia and pain.   Respiratory: Negative for cough, hemoptysis and sputum production.    Cardiovascular: Negative for chest pain, palpitations, orthopnea and claudication.   Gastrointestinal: Negative for abdominal pain, diarrhea, heartburn, nausea and vomiting.   Genitourinary: Negative for dysuria, hematuria and urgency.   Musculoskeletal: Negative for myalgias and neck pain.   Skin: Negative for rash.        Non-healing ulcer L calf   Neurological: Negative for dizziness, tingling and headaches.   Psychiatric/Behavioral: Negative for depression and suicidal ideas. The patient is not nervous/anxious and does not have insomnia.         Physical Exam  Temp:  [36.6 °C (97.9 °F)-37 °C (98.6 °F)] 37 °C (98.6 °F)  Pulse:  [49-70] 56  Resp:  [16-18] 18  BP: (121-150)/(70-91) 136/82    Physical Exam   Constitutional: He is oriented to person, place, and time. No distress.   HENT:   Head: Normocephalic and atraumatic.   Mouth/Throat: No oropharyngeal exudate.   Eyes: Conjunctivae are normal. Right eye exhibits no discharge. Left eye exhibits no discharge.   Neck: Neck supple. No JVD present.   Cardiovascular: Normal rate and regular rhythm.  Exam reveals no gallop and no friction rub.    Pulmonary/Chest: Effort normal and breath sounds normal. No respiratory distress. He has no wheezes.   Abdominal: Soft. Bowel sounds are normal. He  exhibits no distension. There is no tenderness.   Musculoskeletal: He exhibits no edema or tenderness.   Wound vac in place LLE over ulcer  Chronic stasis dermatitis on legs bilaterally.   Lymphadenopathy:     He has no cervical adenopathy.   Neurological: He is alert and oriented to person, place, and time.   Skin: Skin is warm and dry. He is not diaphoretic. No erythema.   Wound vac in place LLE over ulcer     Psychiatric: He has a normal mood and affect. His behavior is normal.   Nursing note and vitals reviewed.      Fluids    Intake/Output Summary (Last 24 hours) at 12/26/18 1910  Last data filed at 12/26/18 1100   Gross per 24 hour   Intake                0 ml   Output              450 ml   Net             -450 ml       Laboratory  Recent Labs      12/24/18   0026  12/25/18   0009  12/26/18   0012   WBC  6.8  6.0  6.6   RBC  4.36*  4.40*  4.34*   HEMOGLOBIN  13.6*  13.7*  13.6*   HEMATOCRIT  42.5  41.5*  42.1   MCV  97.5  94.3  97.0   MCH  31.2  31.1  31.3   MCHC  32.0*  33.0*  32.3*   RDW  57.1*  56.0*  58.8*   PLATELETCT  222  227  217   MPV  9.3  9.0  9.1     Recent Labs      12/24/18   0025  12/25/18   0009  12/26/18   0012   SODIUM  136  138  135   POTASSIUM  4.4  4.7  4.7   CHLORIDE  109  109  107   CO2  20  23  23   GLUCOSE  95  90  89   BUN  27*  28*  27*   CREATININE  1.24  1.20  1.35   CALCIUM  9.0  8.9  9.5                   Imaging  CT-CHEST (THORAX) WITH   Final Result      Ill-defined right upper lobe nodular opacity with surrounding linear opacities. This could be related to scarring or malignancy. Further evaluation with PET/CT is recommended.      Bibasilar atelectasis.      Cardiomegaly.      Bilateral renal cortical scarring.      Dilated ascending aorta measuring 4 cm.      DX-FOOT-COMPLETE 3+ LEFT   Final Result         1.  No acute traumatic bony injury.   2.  Severe pes planus deformity with remodeling of the midfoot bony structures. Appearance suggests Charcot joints.      DX-TIBIA AND  FIBULA LEFT   Final Result         1.  No acute traumatic bony injury.   2.  Severe pes planus deformity with extensive degenerative changes and bony remodeling of the midfoot.      MR-ABDOMEN-WITH & W/O    (Results Pending)        Assessment/Plan  Infected ulcer of skin (HCC)- (present on admission)   Assessment & Plan    Infected left lower extremity venous stasis ulcer  Wound culture grows  Pseudomonas, MRSA, E faecalis and Klebsiella   IV vancomycin and IV Zosyn started on admission  ID consulted - continue zosyn - dc vanco and start zyvox.  Wound vac placed, outpatient wound vac being coordinated.  LPS consulting.  ABX through 1/2/19 - quinolone can be substituted for zosyn on dc per ID           Hyperlipidemia- (present on admission)   Assessment & Plan    Continue atorvastatin 40     Chronic hepatitis C without hepatic coma (HCC)- (present on admission)   Assessment & Plan    In process of workup from GI Dr Park -CT chest completed showing : right upper lobe nodular opacity with surrounding linear opacities, further evaluation with PET recommended vs serial CT Scan    MRI abdomen postponed as not compatible with wound vac, CT abdomen/pelvis on hold given recent contrast with CT chest.  AFP ordered.         Anxiety- (present on admission)   Assessment & Plan    Continue Celexa     Stage 3 chronic kidney disease (HCC)- (present on admission)   Assessment & Plan    Maintaining hydration  Avoiding nephrotoxics  Renal dose all medications.       Lung nodule- (present on admission)   Assessment & Plan    Right upper lobe nodular opacity seen on CT scan of the chest which was seen on previous scans.  This is suspicious for scar tissue versus malignancy.  Outpatient PET scan recommended.     HTN (hypertension)- (present on admission)   Assessment & Plan    Continue Norvasc and Inderal  Monitor blood pressure.     Tobacco use- (present on admission)   Assessment & Plan    Nicotine replacement has been provided      Plan of care discussed with multidisciplinary team during rounds.    VTE prophylaxis: heparin as no surgery planned

## 2018-12-27 NOTE — DISCHARGE PLANNING
"Anticipated Discharge Disposition: Home    Action: Met with pt at bedside, reviewed needed RX.  Pt would like to use [facility].   Contacted this pharmacy P [phone] F [phone].  S/w Pharmacy Manager Luis, reviewed needed rx and supply of Zyvox.  Luis sts, \"I have four pills and will have the rest of the supply tomorrow.\"    Faxed Rx to above number, awaiting information r/t PAR or copay.     Barriers to Discharge: none    Plan: Awaiting Rx information.  ALICIA Rose, coordinating follow up care as needed.     "

## 2018-12-27 NOTE — WOUND TEAM
[facility]  Inpatient Services   Wound and Skin Care Progress Note    Admission Date: 12/18/2018     Consult Date: 12/20/2018 @ 1100   HPI, PMH, SH: Reviewed    Unit where seen by Wound Team: R3    WOUND CONSULT RELATED TO:  Scheduled npwt drsg change    SUBJECTIVE:  Pleasant/agreeable     Self Report / Pain Level:  Min c/o       OBJECTIVE:  nsg had turned the machine off 2/2 to a leak they were unable to repair    WOUND TYPE, LOCATION, CHARACTERISTICS (Pressure Injuries: location, stage, POA or date identified)        Negative Pressure Wound Therapy Leg Posterior;Left (Active)   NPWT Pump Mode / Pressure Setting 125 mmHg/dpc    Dressing Type Small black foam    Number of Foam Pieces Used 1    VAC VeraFlo Irrigant     VAC VeraFlo Soak Time (mins)     VAC VeraFlo Instill Volume (ml)     VAC VeraFlo - Therapy Time (hrs)     VAC VeraFlo Pressure (mm/Hg)     WOUND NURSE ONLY - Time Spent with Patient (mins) 60      Wound Leg Left Posterior LE (Active)   Wound Image      Site Assessment Pink/red with scattered yellow    Marylou-wound Assessment Intact    Margins Attached edges;Defined edges    Wound Length (cm) 6.0    Wound Width (cm) 2.7    Wound Depth (cm) 0.2    Wound Surface Area (cm^2)     Tunneling     Undermining     Closure Secondary intention    Drainage Amount scant    Drainage Description Serosanguineous    Non-staged Wound Description Full thickness    Cleansing Approved Wound Cleanser    Periwound Protectant Benzoin/drape    Dressing Options Wound Vac    Dressing Cleansing/Solutions     Dressing Changed Changed    Dressing Status Drag failed 2/2 leak    Dressing Change Frequency Monday, Wednesday, Friday    NEXT Dressing Change  12/28/18    NEXT Weekly Photo (Inpatient Only) 1/2/2019    WOUND NURSE ONLY - Odor None    WOUND NURSE ONLY - Pulses     WOUND NURSE ONLY - Exposed Structures None    WOUND NURSE ONLY - Tissue Type and Percentage 95% pink/red -- 5% scattered yellow    WOUND NURSE ONLY -  Time Spent with Patient (mins) 60        INTERVENTIONS BY WOUND TEAM:  Prev drsg removed -- wnd irrigated with wnd cleanser -- measurements and photograph done -- benzoin and drape sandrine-wnd -- 1 piece black foam to cover the wnd -- sealed with drape and neg pressure applied at 125mmhg/dpc    Interdisciplinary consultation: Patient, Bedside RN    EVALUATION:  wnd remains relatively clean; placed on regular vac as pt may d/c home tomorrow; can be put back on vera-bassem Friday if he is to remain admitted    Factors affecting wound healing: hx of tobacco abuse, infection    Goals: control drainage -- increase granulation -- steady decrease in wound area and depth weekly.    NURSING PLAN OF CARE ORDERS (x):    Dressing changes: See Dressing Care orders: x  Skin care: See Skin Care orders: x  Rectal tube care: See Rectal Tube Care orders:   Other orders:      WOUND TEAM PLAN OF CARE (x):   NPWT change 3 x week: x       Dressing changes by wound team:     x  Follow up as needed:     x  Other (explain):     Anticipated discharge plans (x):   SNF:           Home Care:           Outpatient Wound Center:            Self Care:            Other:  tbd - per patient he only has rides to the clinic on Wednesday's, may need Home Health if not able to arrange transportation.

## 2018-12-27 NOTE — FACE TO FACE
Face to Face Supporting Documentation - Home Health    The encounter with this patient was in whole or in part the primary reason for home health admission.    Date of encounter:   Patient:                    MRN:                       YOB: 2018  Goran Chavez  [id number]  1955     Home health to see patient for:  Skilled Nursing care for assessment, interventions & education, Physical Therapy evaluation and treatment and Occupational therapy evaluation and treatment    Skilled need for:  Comment: PT/OT/Skilled     Skilled nursing interventions to include:  Comment: PT/OT/Skilled     Homebound status evidenced by:  Need the aid of supportive devices such as crutches, canes, wheelchairs or walkers. Leaving home requires a considerable and taxing effort. There is a normal inability to leave the home.    Community Physician to provide follow up care: Marcela Ch M.D.     Optional Interventions? No      I certify the face to face encounter for this home health care referral meets the CMS requirements and the encounter/clinical assessment with the patient was, in whole, or in part, for the medical condition(s) listed above, which is the primary reason for home health care. Based on my clinical findings: the service(s) are medically necessary, support the need for home health care, and the homebound criteria are met.  I certify that this patient has had a face to face encounter by myself.  Dallas Mason M.D. - NPI: [id number]

## 2018-12-27 NOTE — DISCHARGE PLANNING
Action: MAEGAN s/w dtr., Megan, regarding instructions for setting up MT for transport to [facility].   Plan: Discharge planned for tomorrow if pt. Able to be seen in wound clinic on Monday for wound vac dressing change.

## 2018-12-28 VITALS
WEIGHT: 175.27 LBS | OXYGEN SATURATION: 94 % | HEART RATE: 60 BPM | BODY MASS INDEX: 23.74 KG/M2 | RESPIRATION RATE: 16 BRPM | DIASTOLIC BLOOD PRESSURE: 74 MMHG | HEIGHT: 72 IN | SYSTOLIC BLOOD PRESSURE: 125 MMHG | TEMPERATURE: 97.9 F

## 2018-12-28 LAB
ANION GAP SERPL CALC-SCNC: 7 MMOL/L (ref 0–11.9)
BASOPHILS # BLD AUTO: 0.9 % (ref 0–1.8)
BASOPHILS # BLD: 0.06 K/UL (ref 0–0.12)
BUN SERPL-MCNC: 29 MG/DL (ref 8–22)
CALCIUM SERPL-MCNC: 8.9 MG/DL (ref 8.5–10.5)
CHLORIDE SERPL-SCNC: 108 MMOL/L (ref 96–112)
CO2 SERPL-SCNC: 23 MMOL/L (ref 20–33)
CREAT SERPL-MCNC: 1.32 MG/DL (ref 0.5–1.4)
EOSINOPHIL # BLD AUTO: 0.28 K/UL (ref 0–0.51)
EOSINOPHIL NFR BLD: 4.3 % (ref 0–6.9)
ERYTHROCYTE [DISTWIDTH] IN BLOOD BY AUTOMATED COUNT: 56 FL (ref 35.9–50)
GLUCOSE SERPL-MCNC: 93 MG/DL (ref 65–99)
HCT VFR BLD AUTO: 40.7 % (ref 42–52)
HGB BLD-MCNC: 13.3 G/DL (ref 14–18)
IMM GRANULOCYTES # BLD AUTO: 0.01 K/UL (ref 0–0.11)
IMM GRANULOCYTES NFR BLD AUTO: 0.2 % (ref 0–0.9)
LYMPHOCYTES # BLD AUTO: 1.66 K/UL (ref 1–4.8)
LYMPHOCYTES NFR BLD: 25.6 % (ref 22–41)
MCH RBC QN AUTO: 31.3 PG (ref 27–33)
MCHC RBC AUTO-ENTMCNC: 32.7 G/DL (ref 33.7–35.3)
MCV RBC AUTO: 95.8 FL (ref 81.4–97.8)
MONOCYTES # BLD AUTO: 0.49 K/UL (ref 0–0.85)
MONOCYTES NFR BLD AUTO: 7.6 % (ref 0–13.4)
NEUTROPHILS # BLD AUTO: 3.99 K/UL (ref 1.82–7.42)
NEUTROPHILS NFR BLD: 61.4 % (ref 44–72)
NRBC # BLD AUTO: 0 K/UL
NRBC BLD-RTO: 0 /100 WBC
PLATELET # BLD AUTO: 197 K/UL (ref 164–446)
PMV BLD AUTO: 9.1 FL (ref 9–12.9)
POTASSIUM SERPL-SCNC: 4.3 MMOL/L (ref 3.6–5.5)
RBC # BLD AUTO: 4.25 M/UL (ref 4.7–6.1)
SODIUM SERPL-SCNC: 138 MMOL/L (ref 135–145)
WBC # BLD AUTO: 6.5 K/UL (ref 4.8–10.8)

## 2018-12-28 PROCEDURE — 700102 HCHG RX REV CODE 250 W/ 637 OVERRIDE(OP): Performed by: INTERNAL MEDICINE

## 2018-12-28 PROCEDURE — A9270 NON-COVERED ITEM OR SERVICE: HCPCS | Performed by: FAMILY MEDICINE

## 2018-12-28 PROCEDURE — A9270 NON-COVERED ITEM OR SERVICE: HCPCS | Performed by: INTERNAL MEDICINE

## 2018-12-28 PROCEDURE — 306373 DRESSING,VAC SIMPLACE SMALL: Performed by: FAMILY MEDICINE

## 2018-12-28 PROCEDURE — 36415 COLL VENOUS BLD VENIPUNCTURE: CPT

## 2018-12-28 PROCEDURE — 97605 NEG PRS WND THER DME<=50SQCM: CPT

## 2018-12-28 PROCEDURE — 80048 BASIC METABOLIC PNL TOTAL CA: CPT

## 2018-12-28 PROCEDURE — 99239 HOSP IP/OBS DSCHRG MGMT >30: CPT | Performed by: FAMILY MEDICINE

## 2018-12-28 PROCEDURE — 85025 COMPLETE CBC W/AUTO DIFF WBC: CPT

## 2018-12-28 PROCEDURE — 700102 HCHG RX REV CODE 250 W/ 637 OVERRIDE(OP): Performed by: FAMILY MEDICINE

## 2018-12-28 PROCEDURE — 700111 HCHG RX REV CODE 636 W/ 250 OVERRIDE (IP): Performed by: INTERNAL MEDICINE

## 2018-12-28 RX ADMIN — OXYCODONE HYDROCHLORIDE 10 MG: 10 TABLET ORAL at 03:20

## 2018-12-28 RX ADMIN — HEPARIN SODIUM 5000 UNITS: 5000 INJECTION, SOLUTION INTRAVENOUS; SUBCUTANEOUS at 14:31

## 2018-12-28 RX ADMIN — CIPROFLOXACIN HYDROCHLORIDE 500 MG: 500 TABLET, FILM COATED ORAL at 18:00

## 2018-12-28 RX ADMIN — AMLODIPINE BESYLATE 10 MG: 10 TABLET ORAL at 06:23

## 2018-12-28 RX ADMIN — HEPARIN SODIUM 5000 UNITS: 5000 INJECTION, SOLUTION INTRAVENOUS; SUBCUTANEOUS at 06:23

## 2018-12-28 RX ADMIN — LINEZOLID 600 MG: 600 TABLET, FILM COATED ORAL at 18:00

## 2018-12-28 RX ADMIN — CIPROFLOXACIN HYDROCHLORIDE 500 MG: 500 TABLET, FILM COATED ORAL at 06:23

## 2018-12-28 RX ADMIN — PROPRANOLOL HYDROCHLORIDE 10 MG: 10 TABLET ORAL at 06:23

## 2018-12-28 RX ADMIN — PROPRANOLOL HYDROCHLORIDE 10 MG: 10 TABLET ORAL at 18:00

## 2018-12-28 RX ADMIN — LINEZOLID 600 MG: 600 TABLET, FILM COATED ORAL at 06:23

## 2018-12-28 RX ADMIN — NICOTINE 14 MG: 14 PATCH, EXTENDED RELEASE TRANSDERMAL at 06:23

## 2018-12-28 RX ADMIN — OXYCODONE HYDROCHLORIDE 10 MG: 10 TABLET ORAL at 14:31

## 2018-12-28 RX ADMIN — OXYCODONE HYDROCHLORIDE 10 MG: 10 TABLET ORAL at 09:16

## 2018-12-28 RX ADMIN — OXYCODONE HYDROCHLORIDE 10 MG: 10 TABLET ORAL at 18:00

## 2018-12-28 ASSESSMENT — PAIN SCALES - GENERAL
PAINLEVEL_OUTOF10: 4
PAINLEVEL_OUTOF10: 2
PAINLEVEL_OUTOF10: 4
PAINLEVEL_OUTOF10: 2
PAINLEVEL_OUTOF10: 4
PAINLEVEL_OUTOF10: 4

## 2018-12-28 NOTE — DISCHARGE INSTRUCTIONS
Discharge Instructions    Discharged to home by car with relative. Discharged via wheelchair, hospital escort: Yes.  Special equipment needed: Wound VAC    Be sure to schedule a follow-up appointment with your primary care doctor or any specialists as instructed.     Discharge Plan: discharge home.  Keep your appointment to your wound appointment - use your taxi voucher that has been provided.    Diet Plan: Discussed  Activity Level: Discussed  Confirmed Follow up Appointment: Appointment Scheduled  Confirmed Symptoms Management: Discussed  Medication Reconciliation Updated: Yes  Pneumococcal Vaccine Administered/Refused: Not given - Patient refused pneumococcal vaccine  Influenza Vaccine Indication: Not indicated: Previously immunized this influenza season and > 8 years of age    I understand that a diet low in cholesterol, fat, and sodium is recommended for good health. Unless I have been given specific instructions below for another diet, I accept this instruction as my diet prescription.   Other diet: regular    Special Instructions: None    · Is patient discharged on Warfarin / Coumadin?   No     Depression / Suicide Risk    As you are discharged from this [facility] facility, it is important to learn how to keep safe from harming yourself.    Recognize the warning signs:  · Abrupt changes in personality, positive or negative- including increase in energy   · Giving away possessions  · Change in eating patterns- significant weight changes-  positive or negative  · Change in sleeping patterns- unable to sleep or sleeping all the time   · Unwillingness or inability to communicate  · Depression  · Unusual sadness, discouragement and loneliness  · Talk of wanting to die  · Neglect of personal appearance   · Rebelliousness- reckless behavior  · Withdrawal from people/activities they love  · Confusion- inability to concentrate     If you or a loved one observes any of these behaviors or has concerns about  self-harm, here's what you can do:  · Talk about it- your feelings and reasons for harming yourself  · Remove any means that you might use to hurt yourself (examples: pills, rope, extension cords, firearm)  · Get professional help from the community (Mental Health, Substance Abuse, psychological counseling)  · Do not be alone:Call your Safe Contact- someone whom you trust who will be there for you.  · Call your local CRISIS HOTLINE 821-7624 or 539-525-5414  · Call your local Children's Mobile Crisis Response Team [place] (450) 977-7110 or www.TripsByTips  · Call the toll free National Suicide Prevention Hotlines   · National Suicide Prevention Lifeline 038-115-PHHX (8979)  · National Hope Line Network 800-SUICIDE (778-1776)

## 2018-12-28 NOTE — PROGRESS NOTES
Assumed care of patient at 1900. Pt is A&Ox4, up standby assist with a steady gait. Wound vac to LLE. Pt denies pain and nausea. PIV infusing. Contact precautions in place for MRSA. No further needs at this time. Call light within reach, will continue to round hourly.

## 2018-12-28 NOTE — WOUND TEAM
"[facility]  Inpatient Services   Wound and Skin Care Progress Note    Admission Date: 12/18/2018     Consult Date: 12/20/2018 @ 1100   HPI, PMH, SH: Reviewed    Unit where seen by Wound Team: R3    WOUND CONSULT RELATED TO:  Scheduled npwt drsg change    SUBJECTIVE: \" That wound vac really works good.\"     Self Report / Pain Level:  States 3/10 on 0-10 pain scale      OBJECTIVE:  NPWT dressing in place    WOUND TYPE, LOCATION, CHARACTERISTICS (Pressure Injuries: location, stage, POA or date identified)     Negative Pressure Wound Therapy Leg Posterior;Left (Active)   NPWT Pump Mode / Pressure Setting 125 mmHg;Intermittent    Dressing Type Small;Black foam    Number of Foam Pieces Used 1    Canister Changed No      Wound Image   12/20/2018 11:00 AM   Site Assessment Pink;Red;Yellow    Sandrine-wound Assessment Clean;Dry;Intact    Margins Attached edges;Defined edges    Wound Length (cm) 6 cm    Wound Width (cm) 2.7 cm    Wound Depth (cm) 0.2 cm    Wound Surface Area (cm^2) 16.2 cm^2    Tunneling 0 cm    Undermining 0 cm    Closure Secondary intention    Drainage Amount Scant    Drainage Description Serosanguineous    Non-staged Wound Description Full thickness    Treatments Cleansed    Cleansing Approved Wound Cleanser    Periwound Protectant Benzoin;Drape    Dressing Options Wound Vac    Dressing Cleansing/Solutions Not Applicable    Dressing Changed Changed    Dressing Status Clean;Dry;Intact    Dressing Change Frequency Monday, Wednesday, Friday    NEXT Dressing Change  12/31/18    NEXT Weekly Photo (Inpatient Only) 12/31/18    Odor None    Pulses Left;1+;DP;PT    Exposed Structures None    WOUND NURSE ONLY - Tissue Type and Percentage 95% moist red/pink granular tissue, 5% yellow                INTERVENTIONS BY WOUND TEAM:  Previous dressing removed, no retained foam noted -- wound irrigated with wound cleanser, sandrine wound cleansed with moist wash cloth -- benzoin and drape sandrine-wound -- 1 piece black " foam to cover the wound -- sealed with drape and neg pressure applied at 125 mm Hg/dpc, no leaks noted.    Interdisciplinary consultation: Patient, Bedside RN Rosaline    EVALUATION:  Clean granular wound bed, patient to be discharged home later today. Home [employer] vac is delivered to room and clinic appointment has been made.    Factors affecting wound healing: hx of tobacco abuse, infection    Goals: control drainage -- increase granulation -- steady decrease in wound area and depth weekly.    NURSING PLAN OF CARE ORDERS (x):    Dressing changes: See Dressing Care orders: X  Skin care: See Skin Care orders:   Rectal tube care: See Rectal Tube Care orders:   Other orders:      WOUND TEAM PLAN OF CARE (x):   NPWT change 3 x week: x       Dressing changes by wound team:     x  Follow up as needed:     x  Other (explain):     Anticipated discharge plans (x):   SNF:           Home Care:           Outpatient Wound Center:  X          Self Care:            Other:

## 2018-12-28 NOTE — PROGRESS NOTES
Assumed pt care - bedside report received.  Pt is aaox4-very pleasant.  Slight hypertension noted - scheduled medications on claudette and given.  Pt reports 5/10 - Left le pain - prn pain medication with good relief.  Left LE wound vac in place and patent.  Wound rn to come about 1400 to change out wound vac to vac that pt will take home.  rx's written/appointment for wound appointment is made for next Monday - pt has taxi voucher for that appointment.  Pt will d/c home with daughter today - daughter will be able to  patient at 1800.  piv patent/iv fluids stopped.  Pt continues with isolation.  Good po intake/bm today.  Voids without difficulty.  Up with stand by assist and steady.  Fall precautions in place - pt makes needs known and calls for assist.  Will continue to round

## 2018-12-28 NOTE — CARE PLAN
Problem: Safety  Goal: Will remain free from injury  Outcome: PROGRESSING AS EXPECTED  Pt calls appropriately for assistance, is up SBA to bathroom. Steady gait.     Problem: Bowel/Gastric:  Goal: Normal bowel function is maintained or improved  Outcome: PROGRESSING AS EXPECTED  Pt reporting normal bowel movements. Discussed importance of oral hydration and movement to help prevent constipation given he is taking narcotics for pain control.

## 2018-12-28 NOTE — CARE PLAN
Problem: Safety  Goal: Will remain free from falls    Intervention: Assess risk factors for falls  Fall precautions in place  Pt up with sba and steady - calls for assist.      Problem: Knowledge Deficit  Goal: Knowledge of the prescribed therapeutic regimen will improve    Intervention: Discuss information regarding therpeutic regimen and document in education  Pt to have wound vac trade out today with wound RN before d/c.  Wound vac at bedside.      Problem: Pain Management  Goal: Pain level will decrease to patient's comfort goal    Intervention: Follow pain managment plan developed in collaboration with patient and Interdisciplinary Team  Prn pain medication with tolerable pain control.

## 2018-12-28 NOTE — DISCHARGE PLANNING
Anticipated Discharge Disposition: Home with outpt. Wound care    Action: Cab voucher supplied to pt. To f/u 11:45 Monday 12/31.  MTM information supplied to dtr. Yesterday.  Has new wound vac from [facility].      Barriers to Discharge: Wound dressing change today.      Plan: Home with dtr. Asssist.  Declines [facility] & will f/u at wound clinic on Monday.  Cab voucher supplied 1 time #[id number]

## 2018-12-28 NOTE — PROGRESS NOTES
[facility] Medicine Daily Progress Note    Date of Service  12/27/2018    Chief Complaint  63 y.o. male admitted 12/18/2018 with non-healing leg ulcer and found to have polymicrobial wound culture and told to present to ED.    Hospital Course    Patient admitted and started on Vanco and zosyn.  LPS consulted and reviewed with surgery, no debridement planned at this time.  Wound vac in place and ID consulted for recommendations.      Interval Problem Update  12/20 Patient states he feels no pain and is doing okay.  He is concerned that he missed the appointments for imaging ordered by his GI physician, Dr Park for evaluation for concern of hepatocellular carcinoma given his history of hepatitis C.  As patient is a high risk for cancer, MRI and CT ordered inpatient for further evaluation.  12/21 Patient feeling well.  With wound vac in place, he cannot have MRI at this time, already received contrast for CT chest so will not have CT abdomen/pelvis with contrast at this time.  CT chest does show a mass in the RUL but it has been watched for years and is continuing to decrease in size and appearance is not typical of a malignancy.  Will order AFP for blood work today to further evaluate for HCC.  Coordination for wound vac on discharge in process as this is anticipated to take many days to do so.  12/22 Patient without significant changes overnight, states he feels well and is tolerating the infusions without issue.  His appetite is good along and normal urination and bowel movements.  12/23 Patient states he is feeling well, good appetite.  He is please to hear his A1C is not diabetic and the tumor marker for hepatocellular carcinoma is normal.  Patient to remain in hospital until wound vac can be coordinated for discharge.  12/24 Patient states he has no complaints.  Wound vac dressing was changed earlier today.  RN reported that patient has had loose stools, very low suspicion of c diff at this time.  12/25: Laying in  bed comfortably.  Wound VAC still draining white purulent blood-tinged discharges.  Pain is fairly controlled.  No acute distress noted.  12/26: Patient is laying in bed comfortably.  Wound VAC replaced today.  Pain is fairly controlled.  No new issues to report.  12/27: Clinically stable.  Tolerated p.o. intake well.  Pain fairly controlled.  No distress noted.  Consultants/Specialty  ID -Hemphill    Code Status  full    Disposition  Home once wound vac coordinated.    Review of Systems  Review of Systems   Constitutional: Negative for chills and fever.   HENT: Negative for ear discharge, ear pain, hearing loss, nosebleeds and tinnitus.    Eyes: Negative for blurred vision, double vision, photophobia and pain.   Respiratory: Negative for cough, hemoptysis and sputum production.    Cardiovascular: Negative for chest pain, palpitations, orthopnea and claudication.   Gastrointestinal: Negative for abdominal pain, heartburn, nausea and vomiting.   Genitourinary: Negative for dysuria, hematuria and urgency.   Musculoskeletal: Positive for myalgias. Negative for back pain and neck pain.   Skin: Negative for rash.        Non-healing ulcer L calf   Neurological: Negative for dizziness, tingling, tremors and headaches.   Psychiatric/Behavioral: Negative for depression, substance abuse and suicidal ideas.        Physical Exam  Temp:  [36.6 °C (97.8 °F)-37.2 °C (98.9 °F)] 36.6 °C (97.8 °F)  Pulse:  [55-70] 55  Resp:  [18] 18  BP: (131-144)/(77-93) 131/77    Physical Exam   Constitutional: He is oriented to person, place, and time. No distress.   HENT:   Head: Normocephalic and atraumatic.   Mouth/Throat: No oropharyngeal exudate.   Eyes: Conjunctivae are normal. No scleral icterus.   Neck: Neck supple. No JVD present. No thyromegaly present.   Cardiovascular: Normal rate and regular rhythm.  Exam reveals no gallop and no friction rub.    Pulmonary/Chest: Effort normal. No respiratory distress. He has no wheezes. He has no  rales.   Abdominal: Soft. Bowel sounds are normal. He exhibits no distension. There is no tenderness. There is no rebound.   Musculoskeletal: He exhibits no edema or tenderness.   Wound vac in place LLE over ulcer  Chronic stasis dermatitis on legs bilaterally.   Lymphadenopathy:     He has no cervical adenopathy.   Neurological: He is alert and oriented to person, place, and time.   Skin: Skin is warm and dry. He is not diaphoretic. No erythema. No pallor.   Wound vac in place LLE over ulcer     Psychiatric: He has a normal mood and affect.   Nursing note and vitals reviewed.      Fluids    Intake/Output Summary (Last 24 hours) at 12/27/18 1823  Last data filed at 12/27/18 1315   Gross per 24 hour   Intake             1891 ml   Output              650 ml   Net             1241 ml       Laboratory  Recent Labs      12/25/18   0009  12/26/18   0012  12/27/18   0016   WBC  6.0  6.6  6.9   RBC  4.40*  4.34*  4.52*   HEMOGLOBIN  13.7*  13.6*  14.1   HEMATOCRIT  41.5*  42.1  43.4   MCV  94.3  97.0  96.0   MCH  31.1  31.3  31.2   MCHC  33.0*  32.3*  32.5*   RDW  56.0*  58.8*  56.9*   PLATELETCT  227  217  223   MPV  9.0  9.1  9.1     Recent Labs      12/25/18   0009  12/26/18   0012  12/27/18   0016   SODIUM  138  135  138   POTASSIUM  4.7  4.7  4.6   CHLORIDE  109  107  106   CO2  23  23  24   GLUCOSE  90  89  98   BUN  28*  27*  29*   CREATININE  1.20  1.35  1.25   CALCIUM  8.9  9.5  9.4                   Imaging  CT-CHEST (THORAX) WITH   Final Result      Ill-defined right upper lobe nodular opacity with surrounding linear opacities. This could be related to scarring or malignancy. Further evaluation with PET/CT is recommended.      Bibasilar atelectasis.      Cardiomegaly.      Bilateral renal cortical scarring.      Dilated ascending aorta measuring 4 cm.      DX-FOOT-COMPLETE 3+ LEFT   Final Result         1.  No acute traumatic bony injury.   2.  Severe pes planus deformity with remodeling of the midfoot bony  structures. Appearance suggests Charcot joints.      DX-TIBIA AND FIBULA LEFT   Final Result         1.  No acute traumatic bony injury.   2.  Severe pes planus deformity with extensive degenerative changes and bony remodeling of the midfoot.      MR-ABDOMEN-WITH & W/O    (Results Pending)        Assessment/Plan  Infected ulcer of skin (HCC)- (present on admission)   Assessment & Plan    Infected left lower extremity venous stasis ulcer  Wound culture grows  Pseudomonas, MRSA, E faecalis and Klebsiella   IV vancomycin and IV Zosyn started on admission  ID consulted - continue zosyn - dc vanco and start zyvox.  Wound vac placed, outpatient wound vac being coordinated.  LPS consulting.  On ciprofloxacin and Zyvox.  end date 1/2/2019.  ID signed off.           Hyperlipidemia- (present on admission)   Assessment & Plan    Continue atorvastatin 40     Chronic hepatitis C without hepatic coma (HCC)- (present on admission)   Assessment & Plan    In process of workup from GI Dr Park -CT chest completed showing : right upper lobe nodular opacity with surrounding linear opacities, further evaluation with PET recommended vs serial CT Scan    MRI abdomen postponed as not compatible with wound vac, CT abdomen/pelvis on hold given recent contrast with CT chest.  AFP ordered.         Anxiety- (present on admission)   Assessment & Plan    Continue Celexa     Stage 3 chronic kidney disease (HCC)- (present on admission)   Assessment & Plan    Maintaining hydration  Avoiding nephrotoxics  Renal dose all medications.       Lung nodule- (present on admission)   Assessment & Plan    Right upper lobe nodular opacity seen on CT scan of the chest which was seen on previous scans.  This is suspicious for scar tissue versus malignancy.  Outpatient PET scan recommended.     HTN (hypertension)- (present on admission)   Assessment & Plan    Continue Norvasc and Inderal  Monitor blood pressure.     Tobacco use- (present on admission)    Assessment & Plan    Nicotine replacement has been provided     Plan of care discussed with multidisciplinary team during rounds.    VTE prophylaxis: heparin as no surgery planned

## 2018-12-28 NOTE — CARE PLAN
Problem: Safety  Goal: Will remain free from injury    Intervention: Provide assistance with mobility  Pt is A&Ox4, up standby assist with a steady gait. Pt educated to call when ambulating. Pt calls appropriately.       Problem: Discharge Barriers/Planning  Goal: Patient's continuum of care needs will be met    Intervention: Involve patient and significant other/support system in setting and prioritizing goals for hospital stay and discharge  Pt aware of possible discharge tomorrow. Pt knows that his wound vac will need to be changed before discharging.

## 2018-12-29 ENCOUNTER — PATIENT OUTREACH (OUTPATIENT)
Dept: HEALTH INFORMATION MANAGEMENT | Facility: OTHER | Age: 63
End: 2018-12-29

## 2018-12-29 NOTE — DISCHARGE SUMMARY
"Discharge Summary    CHIEF COMPLAINT ON ADMISSION  Chief Complaint   Patient presents with   • Sent by MD     culture came back positive for \"MRSA, klebsiella, PSAR, E. Faecalis,\" sent to be admittied for IV abx and wound debridement   • Wound Infection     ongoing chronic wound for 2 years on left leg, has wound care dressing in place       Reason for Admission  Sent by MD     Admission Date  12/18/2018    CODE STATUS  Full Code    HPI & HOSPITAL COURSE  This is a 63 y.o. male who has past medical history of hepatitis C, tobacco abuse, polysubstance abuse, and left Charcot foot which is not diabetic, chronic left lower extremity ulcer comes in with worsening nonhealing wound on the left lower extremity.  ID consulted.  The patient on broad-spectrum antibiotics.  Wound culture grew Pseudomonas, MRSA, Enterococcus faecalis, and Klebsiella.  Zyvox was added.  Wound VAC was placed.  Patient follow-up with GI for his hepatitis C.  MRI of the abdomen was recommended but was not feasible during this hospital stay as patient has a wound VAC.  Patient did not have a CT scan of the abdomen and pelvis.  CT scan of the chest was done and showed right upper lobe nodule which could represent scarring tissue or neoplasm.  PET scan outpatient was recommended.  This was scheduled for the patient.  AFP was ordered for concern for any neoplasm in the liver but that came back negative.  ID switched patient to oral antibiotics with ciprofloxacin and Zyvox with end date to be on 1/2/2019.  Patient was hemodynamically clinically stable.  It was recommended that patient follow-up with wound care as an outpatient with wound VAC on.   were consulted to provide funding for outpatient wound VAC.  That was set up for the patient.  Patient was hemodynamically clinically stable.  Outpatient PET scan was scheduled for the patient.  Patient to have outpatient MRI of the abdomen per GI recommendations once when VAC is removed.  " Patient was placed for discharge from medical standpoint.    Therefore, he is discharged in fair and stable condition to home with close outpatient follow-up.    The patient met 2-midnight criteria for an inpatient stay at the time of discharge.    Discharge Date  12/28/2018    FOLLOW UP ITEMS POST DISCHARGE  Follow-up with PCP in 1 week.  Follow-up with GI as per recommendations.  Follow-up with ID as per recommendations.    DISCHARGE DIAGNOSES  Active Problems:    Infected ulcer of skin (HCC) POA: Yes    Tobacco use POA: Yes    HTN (hypertension) POA: Yes    Lung nodule POA: Yes    Stage 3 chronic kidney disease (HCC) POA: Yes    Anxiety POA: Yes    Chronic hepatitis C without hepatic coma (HCC) POA: Yes    Hyperlipidemia POA: Yes  Resolved Problems:    * No resolved hospital problems. *      FOLLOW UP  Future Appointments  Date Time Provider Department Center   12/31/2018 12:00 PM LEEANN Tijerina [address]   1/2/2019 2:30 PM [facility] MRI 1 [facility]   1/2/2019 4:00 PM LEEANN Jimenez [address]   1/4/2019 2:00 PM LEEANN Kwan [address]   1/7/2019 8:00 AM LEEANN Kwan [address]   1/9/2019 10:00 AM LEEANN Lopez [address]   1/9/2019 1:30 PM [address] 1 [address]   1/11/2019 2:30 PM LEEANN Tsang [address]   1/14/2019 9:00 AM LEEANN Jimenez [address]   1/16/2019 2:00 PM LEEANN Lopez [address]   1/18/2019 1:00 PM LEEANN Lopez [address]   1/21/2019 1:30 PM LEEANN Tijerina [address]   1/23/2019 1:00 PM Marcela Ch M.D. [facility]   1/23/2019 2:00 PM LEEANN JimenezND [address]   1/25/2019 1:00 PM LEEANN Lopez [address]   1/28/2019 1:00 PM MICHELLE Alfaro [address]   1/30/2019 2:00 PM LEEANN TijerinaND [address]     Marcela Ch M.D.  [address] [place] [id number]  [phone]    Go on 1/23/2019  keep your schedualed appointment.      MEDICATIONS ON  DISCHARGE     Medication List      START taking these medications      Instructions   ciprofloxacin 500 MG Tabs  Commonly known as:  CIPRO   Take 1 Tab by mouth every 12 hours for 6 days.  Dose:  500 mg     linezolid 600 MG Tabs  Commonly known as:  ZYVOX   Take 1 Tab by mouth every 12 hours for 6 days.  Dose:  600 mg     oxyCODONE immediate-release 5 MG Tabs  Commonly known as:  ROXICODONE   Take 1 Tab by mouth every four hours as needed for up to 3 days.  Dose:  5 mg        CHANGE how you take these medications      Instructions   amLODIPine 10 MG Tabs  What changed:  · medication strength  · how much to take  · when to take this  Commonly known as:  NORVASC   Take 1 Tab by mouth every day.  Dose:  10 mg        CONTINUE taking these medications      Instructions   aspirin EC 81 MG Tbec  Commonly known as:  ECOTRIN   Take 1 Tab by mouth every day.  Dose:  81 mg     atorvastatin 40 MG Tabs  Commonly known as:  LIPITOR   Take 1 Tab by mouth every bedtime.  Dose:  40 mg     citalopram 20 MG Tabs  Commonly known as:  CELEXA   Doctor's comments:  Please consider 90 day supplies to promote better adherence  Take 1 Tab by mouth every day.  Dose:  20 mg     ferrous sulfate 325 (65 Fe) MG tablet   Take 1 Tab by mouth every day.  Dose:  325 mg     nicotine 7 MG/24HR Pt24  Commonly known as:  NICODERM   Apply 1 Patch to skin as directed every 24 hours.  Dose:  1 Patch     propranolol 10 MG Tabs  Commonly known as:  INDERAL   Doctor's comments:  Note decreased dose  Take 1 Tab by mouth 2 times a day.  Dose:  10 mg            Allergies  Allergies   Allergen Reactions   • Norco [Hydrocodone-Acetaminophen] Itching       DIET  Orders Placed This Encounter   Procedures   • Diet Order Regular     Standing Status:   Standing     Number of Occurrences:   1     Order Specific Question:   Diet:     Answer:   Regular [1]       ACTIVITY  As tolerated.  Weight bearing as tolerated    CONSULTATIONS  ID    PROCEDURES  Wound VAC placed as  mentioned above.    LABORATORY  Lab Results   Component Value Date    SODIUM 138 12/28/2018    POTASSIUM 4.3 12/28/2018    CHLORIDE 108 12/28/2018    CO2 23 12/28/2018    GLUCOSE 93 12/28/2018    BUN 29 (H) 12/28/2018    CREATININE 1.32 12/28/2018        Lab Results   Component Value Date    WBC 6.5 12/28/2018    HEMOGLOBIN 13.3 (L) 12/28/2018    HEMATOCRIT 40.7 (L) 12/28/2018    PLATELETCT 197 12/28/2018        Total time of the discharge process exceeds 40 minutes.

## 2018-12-29 NOTE — PROGRESS NOTES
Late note  Discharged instructions given.  Iv d/c'd.  Pt aware of appointments and to  medicaitons.  Other rx in hand.  Switched to pt's take home wound vac and additional education given.  Daughter to arrive arrive at 730pm for discharge home.  All questions answered.  Pt ready to d/c.

## 2018-12-31 ENCOUNTER — NON-PROVIDER VISIT (OUTPATIENT)
Dept: WOUND CARE | Facility: MEDICAL CENTER | Age: 63
End: 2018-12-31
Attending: FAMILY MEDICINE
Payer: MEDICAID

## 2018-12-31 PROCEDURE — 97597 DBRDMT OPN WND 1ST 20 CM/<: CPT

## 2018-12-31 NOTE — PROCEDURES
cswd using scalpel to remove ~12 to 15cm2 slough from wound bed.  2% topical lidocaine applied prior to debridement with ~10 minute dwell time.  Pt denied pain with debridement.

## 2018-12-31 NOTE — PATIENT INSTRUCTIONS
Wound VAC at 125mmHg continuous with black foam. Dressing will be changed every MWF at the wound clinic.  If you are having issues with your wound VAC, please consider patching leaks, changing the canister, or calling [phone] for troubleshooting. If the wound VAC has been off or un-operational for over 2 hours, call wound care center to inform them and remove all dressings including black foam and replace with normal saline damp gauze.     Should you experience any significant changes in your wound(s), such as infection (redness, swelling, localized heat, increased pain, fever > 101 F, chills) or have any questions regarding your home care instructions, please contact the wound center at [phone]. If after hours, contact your primary care physician or go to the hospital emergency room.   Keep dressing clean, dry and cover when bathing. Only change dressing if it's over saturated, soiled or falls off.

## 2018-12-31 NOTE — CERTIFICATION
[facility]  [facility]  [address]  [address]  [place] [address]  [phone] Fax: [phone]     Resumed care after hospitalization: 12/31/18  For Certification Period: 12/31/18 - 03/21/18        Referring Physician: Dallas Mason MD ([facility] hospitalist)  Primary Physician: Marclea Ch M.D.         Wound(s): LLE posterior   Subjective:       HPI: Taken from hospital notes: This is a 63 y.o. male who has past medical history of hepatitis C, tobacco abuse, polysubstance abuse, left Charcot foot which is not diabetic, and chronic left lower extremity ulcer.  He was hospitalized at [facility] 12/18/18 - 12/28/18 for worsening of the LLE posterior ulcer.  He returns to the wound clinic today for resumption of care with a wound vac in place.    Pain: Patient denies pain today     Current Medications:    Current Outpatient Prescriptions:   •  ciprofloxacin (CIPRO) 500 MG Tab, Take 1 Tab by mouth every 12 hours for 6 days., Disp: 12 Tab, Rfl: 0  •  linezolid (ZYVOX) 600 MG Tab, Take 1 Tab by mouth every 12 hours for 6 days., Disp: 12 Tab, Rfl: 0  •  amLODIPine (NORVASC) 10 MG Tab, Take 1 Tab by mouth every day., Disp: 30 Tab, Rfl: 0  •  ferrous sulfate 325 (65 Fe) MG tablet, Take 1 Tab by mouth every day., Disp: 30 Tab, Rfl: 5  •  nicotine (NICODERM) 7 MG/24HR PATCH 24 HR, Apply 1 Patch to skin as directed every 24 hours., Disp: 30 Patch, Rfl: 0  •  citalopram (CELEXA) 20 MG Tab, Take 1 Tab by mouth every day., Disp: 30 Tab, Rfl: 5  •  propranolol (INDERAL) 10 MG Tab, Take 1 Tab by mouth 2 times a day., Disp: 60 Tab, Rfl: 3  •  aspirin EC (ECOTRIN) 81 MG Tablet Delayed Response, Take 1 Tab by mouth every day., Disp: 30 Tab, Rfl: 11  •  atorvastatin (LIPITOR) 40 MG Tab, Take 1 Tab by mouth every bedtime., Disp: 90 Tab, Rfl: 3       Allergies:  Norco [hydrocodone-acetaminophen]    Social History:  Social History     Social History   • Marital status: Legally      Spouse name:  N/A   • Number of children: N/A   • Years of education: N/A     Occupational History   • Not on file.     Social History Main Topics   • Smoking status: Former Smoker     Packs/day: 0.00     Years: 48.00     Types: Cigarettes     Quit date: 12/5/2018   • Smokeless tobacco: Never Used      Comment: states he is using 7mg nicotine patch   • Alcohol use No      Comment: history of alcoholism    • Drug use: Yes     Types: Marijuana, Inhaled      Comment: MJ every day   • Sexual activity: Not on file     Other Topics Concern   • Not on file     Social History Narrative   • No narrative on file      Objective:       Tests and Measures:   10/17/18: arterial studies show L BHUPINDER 1.13 and R BHUPINDER 1.23, triphasic to the ankle.    Findings   Bilateral   There is no evidence of major arterial disease demonstrated bilaterally.   Doppler waveforms of the common femoral, popliteal, posterior tibial, and    dorsalis pedis arteries are of high amplitude and triphasic.     Orthotic, protective, supportive devices: none         Wound Leg Left Posterior LE (Active)   Wound Image       12/31/2018 12:00 PM   Site Assessment Yellow;Red 12/31/2018 12:00 PM   Marylou-wound Assessment Intact;Maceration 12/31/2018 12:00 PM   Margins Attached edges 12/31/2018 12:00 PM   Wound Length (cm) 5.2 cm 12/31/2018 12:00 PM   Wound Width (cm) 2.7 cm 12/31/2018 12:00 PM   Wound Depth (cm) 0.2 cm 12/31/2018 12:00 PM   Wound Surface Area (cm^2) 14.04 cm^2 12/31/2018 12:00 PM   Tunneling 0 cm 12/31/2018 12:00 PM   Undermining 0 cm 12/31/2018 12:00 PM   Closure Secondary intention 12/31/2018 12:00 PM   Drainage Amount Moderate 12/31/2018 12:00 PM   Drainage Description Serosanguineous 12/31/2018 12:00 PM   Non-staged Wound Description Full thickness 12/31/2018 12:00 PM   Treatments Cleansed;sharp debridement 12/31/2018 12:00 PM   Cleansing Approved Wound Cleanser 12/31/2018 12:00 PM   Periwound Protectant Skin Protectant wipes to Periwound;Drape 12/31/2018 12:00 PM    Dressing Options Wound Vac 12/31/2018 12:00 PM   Dressing Cleansing/Solutions Normal Saline 12/31/2018 12:00 PM   Dressing Changed Changed 12/31/2018 12:00 PM   Dressing Status Clean;Dry;Intact 11/29/2018  2:14 PM   Dressing Change Frequency Monday, Wednesday, Friday 12/31/2018 12:00 PM   WOUND NURSE ONLY - Odor None 12/31/2018 12:00 PM   WOUND NURSE ONLY - Pulses See note about arterial studies above 12/31/2018 12:00 PM   WOUND NURSE ONLY - Exposed Structures None 12/31/2018 12:00 PM   WOUND NURSE ONLY - Tissue Type and Percentage 90% moist red, 10% yellow 12/31/2018 12:00 PM     Patient Education: POC taught to pt.  Wound vac troubleshooting taught.  Pt reports he caught the vac tubing on a chair at home and the whole button pulled off.  He left the dressing in place since last night.  Need to remove vac foam if machine is off more than 2 hours taught.  Need to keep dsng CDI taught.  S/sx infection to monitor for reviewed and need to go to UC/ER if noted.  Pt reports he is consistent with his antibiotics.       Professional Collaboration: 90 Day certification sent to pcp via epic     Assessment:       Wound etiology: trauma, infection     Wound Progress: Resumption of care after hospitalization today: wound improved.     Rationale for Treatment: wound vac to encourage accelerated granulation tissue formation    Patient tolerance/compliance: Pt agreeable with poc and appt schedule.     Complicating factors: former smoker x50 years.  Recent infection.     Need for ongoing Advanced Wound Care services: continued skilled wound care for debridement as needed, dressing management and skilled clinical observation to prevent complications and expedite healing.    Plan:       Treatment Plan and Recommendations:  Diagnosis/ICD10: S31.109A, L08.9 - Chronic wound infection of abdomen, initial encounter       Procedures/CPT: cswd 50316     Frequency: 3x/week per vac protocol      At the time of each visit a thorough  assessment of the patient is completed to assure the  appropriateness of our plan of care.  The dressings or modalities may need to be adapted   from the original plan to address any significant changes in the wound environment.

## 2019-01-02 ENCOUNTER — APPOINTMENT (OUTPATIENT)
Dept: RADIOLOGY | Facility: MEDICAL CENTER | Age: 64
End: 2019-01-02
Attending: INTERNAL MEDICINE
Payer: MEDICAID

## 2019-01-02 ENCOUNTER — NON-PROVIDER VISIT (OUTPATIENT)
Dept: WOUND CARE | Facility: MEDICAL CENTER | Age: 64
End: 2019-01-02
Attending: FAMILY MEDICINE
Payer: MEDICAID

## 2019-01-02 PROCEDURE — 97597 DBRDMT OPN WND 1ST 20 CM/<: CPT

## 2019-01-03 NOTE — PATIENT INSTRUCTIONS
Should you experience any significant changes in your wound(s) such as infection (redness, swelling, localized heat, increased pain, fever >101 F, chills) or have any questions regarding your home care instructions, please contact the wound center [phone]. If after hours, contact your primary care physician or go the hospital emergency room.  Keep dressing clean and dry and cover while bathing. Only change dressing if over saturated, soiled or its falling off.

## 2019-01-04 ENCOUNTER — NON-PROVIDER VISIT (OUTPATIENT)
Dept: WOUND CARE | Facility: MEDICAL CENTER | Age: 64
End: 2019-01-04
Attending: FAMILY MEDICINE
Payer: MEDICAID

## 2019-01-04 PROCEDURE — 97597 DBRDMT OPN WND 1ST 20 CM/<: CPT

## 2019-01-04 NOTE — PATIENT INSTRUCTIONS
Wound VAC at 125mmHg continuous or intermittent with black foam. Dressing will be changed every MWF at the wound clinic or with your home health nurse.  If you are having issues with your wound VAC, please consider patching leaks, changing the canister, or calling [phone] for troubleshooting. If the wound VAC has been off or un-operational for over 2 hours, call wound care center to inform them and remove all dressings including black foam and replace with normal saline damp gauze.     Should you experience any significant changes in your wound(s), such as infection (redness, swelling, localized heat, increased pain, fever > 101 F, chills) or have any questions regarding your home care instructions, please contact the wound center at [phone]. If after hours, contact your primary care physician or go to the hospital emergency room.

## 2019-01-04 NOTE — PROCEDURES
Lido 2% gel to wound bed, dwell time approx 10 min. CSWD with curette to remove approx 2 cm2 of wound bed loose necrotic tissue and biofilm. Pt tolerated procedure well.

## 2019-01-07 ENCOUNTER — NON-PROVIDER VISIT (OUTPATIENT)
Dept: WOUND CARE | Facility: MEDICAL CENTER | Age: 64
End: 2019-01-07
Attending: FAMILY MEDICINE
Payer: MEDICAID

## 2019-01-07 PROCEDURE — 97597 DBRDMT OPN WND 1ST 20 CM/<: CPT

## 2019-01-07 NOTE — PROCEDURES
Pulses palpable DP,PT, 2+. CSWD with curette to remove biofilm and loose yellow necrotic tissue at edge. Pt tolerated procedure well without lidocaine.

## 2019-01-07 NOTE — PATIENT INSTRUCTIONS
Wound VAC at 125mmHg continuous or intermittent with black foam. Dressing will be changed every MWF at the wound clinic or with your home health nurse.  If you are having issues with your wound VAC, please consider patching leaks, changing the canister, or calling [phone] for troubleshooting. If the wound VAC has been off or un-operational for over 2 hours, call wound care center to inform them and remove all dressings including black foam and replace with normal saline damp gauze.     Should you experience any significant changes in your wound(s), such as infection (redness, swelling, localized heat, increased pain, fever > 101 F, chills) or have any questions regarding your home care instructions, please contact the wound center at [phone]. If after hours, contact your primary care physician or go to the hospital emergency room.   Keep dressing clean, dry and cover when bathing. Only change dressing if it's over saturated, soiled or falls off.   Avoid prolonged standing or sitting without elevating your legs.  - Apply tubigrip to your legs ending 2 fingers below back of knee without wrinkles.    Only remove if temperature or sensation changes.   If compression needs to be removed, un-wrap it do not cut it off.     PT REFUSED AVS

## 2019-01-09 ENCOUNTER — HOSPITAL ENCOUNTER (OUTPATIENT)
Dept: RADIOLOGY | Facility: MEDICAL CENTER | Age: 64
End: 2019-01-09
Attending: FAMILY MEDICINE
Payer: MEDICAID

## 2019-01-09 ENCOUNTER — NON-PROVIDER VISIT (OUTPATIENT)
Dept: WOUND CARE | Facility: MEDICAL CENTER | Age: 64
End: 2019-01-09
Attending: FAMILY MEDICINE
Payer: MEDICAID

## 2019-01-09 DIAGNOSIS — R91.1 LUNG NODULE: ICD-10-CM

## 2019-01-09 PROCEDURE — 97605 NEG PRS WND THER DME<=50SQCM: CPT

## 2019-01-09 PROCEDURE — 78815 PET IMAGE W/CT SKULL-THIGH: CPT

## 2019-01-09 NOTE — PROCEDURES
Applied NPWT to LLE posterior wound at 125 mmHg with two pieces of black foam and sealed with drape, no leaks noted.

## 2019-01-09 NOTE — PATIENT INSTRUCTIONS
Wound VAC at 125mmHg continuous or intermittent with 2 foam. Dressing will be changed every MWF at the wound clinic or with your home health nurse.  If you are having issues with your wound VAC, please consider patching leaks, changing the canister, or calling [phone] for troubleshooting. If the wound VAC has been off or un-operational for over 2 hours, call wound care center to inform them and remove all dressings including black foam and replace with normal saline damp gauze.     Should you experience any significant changes in your wound(s), such as infection (redness, swelling, localized heat, increased pain, fever > 101 F, chills) or have any questions regarding your home care instructions, please contact the wound center at [phone]. If after hours, contact your primary care physician or go to the hospital emergency room.   Keep dressing clean, dry and cover when bathing. Only change dressing if it's over saturated, soiled or falls off.

## 2019-01-11 ENCOUNTER — NON-PROVIDER VISIT (OUTPATIENT)
Dept: WOUND CARE | Facility: MEDICAL CENTER | Age: 64
End: 2019-01-11
Attending: FAMILY MEDICINE
Payer: MEDICAID

## 2019-01-11 PROCEDURE — 97605 NEG PRS WND THER DME<=50SQCM: CPT

## 2019-01-11 NOTE — PATIENT INSTRUCTIONS
Wound VAC at 125mmHg continuous with 2 black foam. Dressing will be changed every MWF at the wound clinic or with your home health nurse.  If you are having issues with your wound VAC, please consider patching leaks, changing the canister, or calling [phone] for troubleshooting. If the wound VAC has been off or un-operational for over 2 hours, call wound care center to inform them and remove all dressings including black foam and replace with normal saline damp gauze.     Keep dressing clean and dry and cover while bathing. Only change dressing if over saturated, soiled or its falling off.     Should you experience any significant changes in your wound(s) such as infection (redness, swelling, localized heat, increased pain, fever >101 F, chills) or have any questions regarding your home care instructions, please contact the wound center [phone]. If after hours, contact your primary care physician or go the hospital emergency room.

## 2019-01-14 ENCOUNTER — NON-PROVIDER VISIT (OUTPATIENT)
Dept: WOUND CARE | Facility: MEDICAL CENTER | Age: 64
End: 2019-01-14
Attending: FAMILY MEDICINE
Payer: MEDICAID

## 2019-01-14 PROCEDURE — 97597 DBRDMT OPN WND 1ST 20 CM/<: CPT

## 2019-01-14 NOTE — PROCEDURES
CSWD using curette to remove 2cm2 of slough and biofilm.  Pt tolerated well (topical lidocaine with dwell time of 5 mins prior to procedure).  Cleansed with NS and applied dsgs (see flowsheet).  NPWT functioning well at 125mmHg continuous.

## 2019-01-14 NOTE — PATIENT INSTRUCTIONS
Reviewed POC, VAC/NPWT therapy and to remove VAC if off for more than 2 hours (apply honorio and secondary dressing or NS moist dsg), nutrition for wound healing, s/s of complications/infection, when to notify MD/go to ER.  Pt verbalized understanding to all.

## 2019-01-16 ENCOUNTER — NON-PROVIDER VISIT (OUTPATIENT)
Dept: WOUND CARE | Facility: MEDICAL CENTER | Age: 64
End: 2019-01-16
Attending: FAMILY MEDICINE
Payer: MEDICAID

## 2019-01-16 PROCEDURE — 97597 DBRDMT OPN WND 1ST 20 CM/<: CPT

## 2019-01-16 NOTE — PROCEDURES
Wound Care Procedures 1/16/2019:  CSWD with scalpel to remove ~12 cm2 slough and biofilm from wound.  Wound vac reapplied using two pieces of black foam.

## 2019-01-16 NOTE — PATIENT INSTRUCTIONS
-You have a wound vac at 125 mmHg continuous with black foam. The dressing will be changed every Monday, Wednesday, and Friday at the wound clinic.    -If you are having issues with your wound vac, please consider patching leaks, changing the canister, or calling [phone] for troubleshooting. If the wound vac has been off or non-operational for over 2 hours, call wound care center to inform them and remove all dressings including black foam and replace with gauze moistened with normal saline.     -Should you experience any significant changes in your wound(s), such as infection (redness, swelling, localized heat, increased pain, fever > 101 F, chills) or have any questions regarding your home care instructions, please contact the wound center at [phone]. If after hours, contact your primary care physician or go to the hospital emergency room.

## 2019-01-18 ENCOUNTER — NON-PROVIDER VISIT (OUTPATIENT)
Dept: WOUND CARE | Facility: MEDICAL CENTER | Age: 64
End: 2019-01-18
Attending: FAMILY MEDICINE
Payer: MEDICAID

## 2019-01-18 PROCEDURE — 97597 DBRDMT OPN WND 1ST 20 CM/<: CPT

## 2019-01-18 NOTE — PATIENT INSTRUCTIONS
Wound VAC at 125mmHg continuous or intermittent with 2 foam. Dressing will be changed every MWF at the wound clinic or with your home health nurse.  If you are having issues with your wound VAC, please consider patching leaks, changing the canister, or calling [phone] for troubleshooting. If the wound VAC has been off or un-operational for over 2 hours, call wound care center to inform them and remove all dressings including black foam and replace with normal saline damp gauze.     Should you experience any significant changes in your wound(s), such as infection (redness, swelling, localized heat, increased pain, fever > 101 F, chills) or have any questions regarding your home care instructions, please contact the wound center at [phone]. If after hours, contact your primary care physician or go to the hospital emergency room.   Keep dressing clean, dry and cover when bathing. Only change dressing if it's over saturated, soiled or falls off.

## 2019-01-18 NOTE — PROCEDURES
CSWD with scalpel to remove non viable biofilm layer of yellow over red granulation of wound bed on leg of ~4cm2.

## 2019-01-21 ENCOUNTER — NON-PROVIDER VISIT (OUTPATIENT)
Dept: WOUND CARE | Facility: MEDICAL CENTER | Age: 64
End: 2019-01-21
Attending: FAMILY MEDICINE
Payer: MEDICAID

## 2019-01-21 PROCEDURE — 97605 NEG PRS WND THER DME<=50SQCM: CPT

## 2019-01-23 ENCOUNTER — HOSPITAL ENCOUNTER (OUTPATIENT)
Dept: RADIOLOGY | Facility: MEDICAL CENTER | Age: 64
End: 2019-01-23
Attending: INTERNAL MEDICINE
Payer: MEDICAID

## 2019-01-23 ENCOUNTER — NON-PROVIDER VISIT (OUTPATIENT)
Dept: WOUND CARE | Facility: MEDICAL CENTER | Age: 64
End: 2019-01-23
Attending: FAMILY MEDICINE
Payer: MEDICAID

## 2019-01-23 ENCOUNTER — OFFICE VISIT (OUTPATIENT)
Dept: PULMONOLOGY | Facility: HOSPICE | Age: 64
End: 2019-01-23
Payer: MEDICAID

## 2019-01-23 ENCOUNTER — OFFICE VISIT (OUTPATIENT)
Dept: MEDICAL GROUP | Facility: MEDICAL CENTER | Age: 64
End: 2019-01-23
Attending: INTERNAL MEDICINE
Payer: MEDICAID

## 2019-01-23 ENCOUNTER — NON-PROVIDER VISIT (OUTPATIENT)
Dept: PULMONOLOGY | Facility: HOSPICE | Age: 64
End: 2019-01-23
Payer: MEDICAID

## 2019-01-23 VITALS
HEIGHT: 72 IN | WEIGHT: 175 LBS | SYSTOLIC BLOOD PRESSURE: 110 MMHG | RESPIRATION RATE: 16 BRPM | HEART RATE: 72 BPM | OXYGEN SATURATION: 99 % | BODY MASS INDEX: 23.7 KG/M2 | DIASTOLIC BLOOD PRESSURE: 68 MMHG | TEMPERATURE: 97.9 F

## 2019-01-23 DIAGNOSIS — J43.2 CENTRILOBULAR EMPHYSEMA (HCC): ICD-10-CM

## 2019-01-23 DIAGNOSIS — D64.9 ANEMIA, UNSPECIFIED TYPE: ICD-10-CM

## 2019-01-23 DIAGNOSIS — F15.10 METHAMPHETAMINE ABUSE (HCC): ICD-10-CM

## 2019-01-23 DIAGNOSIS — I10 ESSENTIAL HYPERTENSION: ICD-10-CM

## 2019-01-23 DIAGNOSIS — B18.2 CHRONIC HEPATITIS C WITHOUT HEPATIC COMA (HCC): ICD-10-CM

## 2019-01-23 DIAGNOSIS — R93.819 ABNORMAL FINDING ON DIAGNOSTIC IMAGING OF TESTICLE: ICD-10-CM

## 2019-01-23 DIAGNOSIS — R91.1 LUNG NODULE: ICD-10-CM

## 2019-01-23 DIAGNOSIS — Z72.0 TOBACCO USE: ICD-10-CM

## 2019-01-23 DIAGNOSIS — I83.029 VENOUS STASIS ULCER OF LEFT LOWER EXTREMITY (HCC): ICD-10-CM

## 2019-01-23 DIAGNOSIS — L97.929 VENOUS STASIS ULCER OF LEFT LOWER EXTREMITY (HCC): ICD-10-CM

## 2019-01-23 DIAGNOSIS — F41.9 ANXIETY: ICD-10-CM

## 2019-01-23 PROCEDURE — 99214 OFFICE O/P EST MOD 30 MIN: CPT | Performed by: INTERNAL MEDICINE

## 2019-01-23 PROCEDURE — A9585 GADOBUTROL INJECTION: HCPCS | Performed by: INTERNAL MEDICINE

## 2019-01-23 PROCEDURE — 700117 HCHG RX CONTRAST REV CODE 255: Performed by: INTERNAL MEDICINE

## 2019-01-23 PROCEDURE — 99212 OFFICE O/P EST SF 10 MIN: CPT | Performed by: INTERNAL MEDICINE

## 2019-01-23 PROCEDURE — 99213 OFFICE O/P EST LOW 20 MIN: CPT | Performed by: INTERNAL MEDICINE

## 2019-01-23 PROCEDURE — 94060 EVALUATION OF WHEEZING: CPT | Performed by: INTERNAL MEDICINE

## 2019-01-23 PROCEDURE — 99244 OFF/OP CNSLTJ NEW/EST MOD 40: CPT | Performed by: INTERNAL MEDICINE

## 2019-01-23 PROCEDURE — 71046 X-RAY EXAM CHEST 2 VIEWS: CPT

## 2019-01-23 PROCEDURE — 74183 MRI ABD W/O CNTR FLWD CNTR: CPT

## 2019-01-23 PROCEDURE — 97605 NEG PRS WND THER DME<=50SQCM: CPT

## 2019-01-23 RX ORDER — AMLODIPINE BESYLATE 10 MG/1
10 TABLET ORAL DAILY
Qty: 30 TAB | Refills: 5 | Status: SHIPPED
Start: 2019-01-23 | End: 2019-12-26

## 2019-01-23 RX ORDER — GADOBUTROL 604.72 MG/ML
8 INJECTION INTRAVENOUS ONCE
Status: COMPLETED | OUTPATIENT
Start: 2019-01-23 | End: 2019-01-23

## 2019-01-23 RX ADMIN — GADOBUTROL 8 ML: 604.72 INJECTION INTRAVENOUS at 11:46

## 2019-01-23 ASSESSMENT — ENCOUNTER SYMPTOMS
EYES NEGATIVE: 1
RESPIRATORY NEGATIVE: 1
PSYCHIATRIC NEGATIVE: 1
CARDIOVASCULAR NEGATIVE: 1
MUSCULOSKELETAL NEGATIVE: 1
NEUROLOGICAL NEGATIVE: 1
GASTROINTESTINAL NEGATIVE: 1

## 2019-01-23 ASSESSMENT — PULMONARY FUNCTION TESTS
FVC: 3.97
FEV1: 2.91
FEV1/FVC_PERCENT_CHANGE: 0
FEV1_PERCENT_CHANGE: 1
FEV1_PREDICTED: 3.74
FEV1/FVC_PERCENT_PREDICTED: 96
FEV1/FVC_PREDICTED: 74.95
FEV1_PERCENT_PREDICTED: 77
FEV1/FVC_PERCENT_PREDICTED: 96
FEV1: 2.86
FVC: 3.99
FEV1/FVC: 72
FEV1/FVC: 72.93
FEV1_PREDICTED: 76
FVC_PREDICTED: 4.99
FEV1_PERCENT_CHANGE: 0
FVC_PERCENT_PREDICTED: 79
FVC_PERCENT_PREDICTED: 80

## 2019-01-23 ASSESSMENT — PATIENT HEALTH QUESTIONNAIRE - PHQ9: CLINICAL INTERPRETATION OF PHQ2 SCORE: 0

## 2019-01-23 ASSESSMENT — PAIN SCALES - GENERAL: PAINLEVEL: NO PAIN

## 2019-01-23 NOTE — PROGRESS NOTES
Pulmonary Consultation    Date of Service: 1/23/2019    Consulting Physician: Marcela Ch M.D.    Reason for consult:  Establish Care and Other (Lung Mass)      Problem List Items Addressed This Visit     Lung nodule     The upper lobe infiltrative process has improved further and PET scan is negative 1/19/19  No further follow up    Ex smoker - congratulated on smoking cessation.  Spirometry today is normal           Other Visit Diagnoses     Centrilobular emphysema (HCC)        Relevant Orders    Spirometry        Immunization History   Administered Date(s) Administered   • Influenza Vaccine Quad Inj (Pf) 09/26/2018   • Pneumococcal polysaccharide vaccine (PPSV-23) 06/15/2016   • Tdap Vaccine 03/21/2016         History of Present Illness: Goran Chavez is a 63 y.o. male with a past medical history of  hepatitis C, tobacco abuse, polysubstance abuse, and left Charcot foot which is not diabetic, chronic left lower extremity ulcer comes in with worsening nonhealing wound on the left lower extremity  Wound culture grew Pseudomonas, MRSA, Enterococcus faecalis, and Klebsiella and now has a wound vac  Living with his daughter  Referred for lung masses but PET/CT on 1/9/19 personally viewed looks like resolving infiltrates and scar and SUV is negative  Patient has no pulmonary complaints at all        Review of Systems   Constitutional: Positive for malaise/fatigue.   HENT: Negative.    Eyes: Negative.    Respiratory: Negative.    Cardiovascular: Negative.    Gastrointestinal: Negative.    Genitourinary: Negative.    Musculoskeletal: Negative.    Skin:        Wound vac on left foot   Neurological: Negative.    Endo/Heme/Allergies: Negative.    Psychiatric/Behavioral: Negative.        Current Outpatient Prescriptions on File Prior to Visit   Medication Sig Dispense Refill   • nicotine (NICODERM) 7 MG/24HR PATCH 24 HR Apply 1 Patch to skin as directed every 24 hours. 30 Patch 0   • amLODIPine (NORVASC) 10  MG Tab Take 1 Tab by mouth every day. 30 Tab 5   • ferrous sulfate 325 (65 Fe) MG tablet Take 1 Tab by mouth every day. 30 Tab 5   • citalopram (CELEXA) 20 MG Tab Take 1 Tab by mouth every day. 30 Tab 5   • propranolol (INDERAL) 10 MG Tab Take 1 Tab by mouth 2 times a day. 60 Tab 3   • aspirin EC (ECOTRIN) 81 MG Tablet Delayed Response Take 1 Tab by mouth every day. 30 Tab 11   • atorvastatin (LIPITOR) 40 MG Tab Take 1 Tab by mouth every bedtime. 90 Tab 3     No current facility-administered medications on file prior to visit.        Social History   Substance Use Topics   • Smoking status: Former Smoker     Packs/day: 0.00     Years: 48.00     Types: Cigarettes     Quit date: 2018   • Smokeless tobacco: Never Used      Comment: states he is using 7mg nicotine patch   • Alcohol use No      Comment: history of alcoholism         Past Medical History:   Diagnosis Date   • Anxiety    • Charcot's joint of left foot, non-diabetic 3/21/2016   • Chronic congestive heart failure (HCC) 2017   • Hepatitis C, chronic (HCC)    • Hypertension    • Migraine    • Polysubstance abuse (HCC) 3/8/2018   • Tobacco use 2016   • Ulcer of left lower extremity with necrosis of muscle (HCC) 3/21/2016   • Venous stasis ulcer (HCC) 2017    bilateral lower extremity       Past Surgical History:   Procedure Laterality Date   • TIBIA ORIF Right    • SHOULDER ORIF Left    • HAND SURGERY Left     due to infection   • PB DRAIN SKIN ABSCESS SIMPLE Left     leg, near the knee, remote       Allergies: Norco [hydrocodone-acetaminophen]    Family History   Problem Relation Age of Onset   • Lung Disease Mother 70         from COPD   • Hypertension Mother    • Other Father 82         from brain aneurysm after fall   • Cancer Neg Hx    • Heart Disease Neg Hx    • Stroke Neg Hx    • Diabetes Neg Hx        Vitals:    19 1523   Height: 1.829 m (6')   Weight: 79.4 kg (175 lb)   Weight % change since last entry.: 0 %   BP:  110/68   Pulse: 72   BMI (Calculated): 23.73   Resp: 16   Temp: 36.6 °C (97.9 °F)   TempSrc: Temporal       Physical Examination  Physical Exam   Constitutional: He is oriented to person, place, and time and well-developed, well-nourished, and in no distress. No distress.   HENT:   Head: Normocephalic and atraumatic.   Right Ear: External ear normal.   Left Ear: External ear normal.   Nose: Nose normal.   Mouth/Throat: Oropharynx is clear and moist. No oropharyngeal exudate.   edentulous   Eyes: Pupils are equal, round, and reactive to light. Conjunctivae and EOM are normal.   Neck: Normal range of motion. Neck supple. No JVD present. No tracheal deviation present. No thyromegaly present.   Cardiovascular: Normal rate, regular rhythm, normal heart sounds and intact distal pulses.  Exam reveals no gallop and no friction rub.    No murmur heard.  Pulmonary/Chest: Effort normal and breath sounds normal. No stridor. No respiratory distress. He has no wheezes. He has no rales. He exhibits no tenderness.   Abdominal: Soft. Bowel sounds are normal. He exhibits no distension and no mass.   Musculoskeletal: Normal range of motion. He exhibits no edema or deformity.   Lymphadenopathy:     He has no cervical adenopathy.   Neurological: He is alert and oriented to person, place, and time. No cranial nerve deficit. He exhibits normal muscle tone. Gait normal. Coordination normal. GCS score is 15.   Skin: No rash noted. He is not diaphoretic.   Psychiatric: Mood, memory, affect and judgment normal.         Results:  1/9/19  CT PET scan done and personally viewed and patchy opacity in rul and rll no SUV uptake  Pulmonary function tests  Acceptable and reproducible  FEV1 2.86 L [76%], FVC 3.97 L[79%], ratio 72%  Flow volume loops concavity of the expiratory loop suggestive peripheral air obstruction  Impression:  Normal spirometry with no response to bronchodilators      Other pertinent testing:  Official PET scan  "results  \"COMPARISON: 12/20/2018.    FINDINGS:    VISUALIZED BRAIN: Normal metabolic activity.    HEAD AND NECK: Normal physiologic uptake.    CHEST: Background blood pool activity shows average SUV of 1.7.    Lungs show no hypermetabolic pulmonary nodules.    Redemonstration of patchy opacities in the right upper lobe and right lower lobe without increased uptake.    Cardiomegaly.    There is no hypermetabolic hilar, mediastinal, or axillary lymphadenopathy.    ABDOMEN AND PELVIS: Background liver activity shows average SUV of 1.9.    There is normal, uniform metabolic activity in the liver, spleen, adrenal glands, kidneys.    There is no hypermetabolic mesenteric, retroperitoneal, iliac, or inguinal lymphadenopathy.    Nonspecific physiologic activity in the colon and intestine is noted.    VISUALIZED MUSCULOSKELETAL SYSTEM: No hypermetabolic activity to suggest osteolytic metastases or sclerosis to suggest osteoblastic metastases.\"      Yanet Fletcher MD MPH [facility]  [facility]  "

## 2019-01-23 NOTE — NON-PROVIDER
Discussed with pt and his daughter re pt being seen for vascular consult to determine if any venous intervention can be done to facilitate wound healing. Pt is agreeable. PAR asked to schedule pt with Dr. Paige bonds.

## 2019-01-23 NOTE — PATIENT INSTRUCTIONS
Should you experience any significant changes in your wound(s) such as infection (redness, swelling, localized heat, increased pain, fever >101 F, chills) or have any questions regarding your home care instructions, please contact the wound center [phone]. If after hours, contact your primary care physician or go the hospital emergency room.  Keep dressing clean and dry and cover while bathing. Only change dressing if over saturated, soiled or its falling off.

## 2019-01-23 NOTE — PROCEDURES
Wound was non selectively debrided with gauze to remove biofilm. CVAC applied, 1 piece black foam, pump started at neg 125mmhg continuous. No leaks noted.

## 2019-01-24 VITALS
DIASTOLIC BLOOD PRESSURE: 70 MMHG | WEIGHT: 177 LBS | OXYGEN SATURATION: 95 % | RESPIRATION RATE: 16 BRPM | HEART RATE: 64 BPM | HEIGHT: 72 IN | SYSTOLIC BLOOD PRESSURE: 118 MMHG | TEMPERATURE: 98.8 F | BODY MASS INDEX: 23.98 KG/M2

## 2019-01-24 NOTE — ASSESSMENT & PLAN NOTE
The upper lobe infiltrative process has improved further and PET scan is negative 1/19/19  No further follow up    Ex smoker - congratulated on smoking cessation.  Spirometry today is normal

## 2019-01-24 NOTE — ASSESSMENT & PLAN NOTE
His amlodipine was increased during his most recent hospital stay to 10 mg daily.  BP in clinic today was 118/70

## 2019-01-24 NOTE — PROGRESS NOTES
Subjective:   Goran Chavez is a 63 y.o. male here today for follow up recent hospital stay, leg ulcer, requesting RTC paperwork to be completed, chronic issues as below    Tobacco use  Last cigarette was about 1 month ago.  He continues to use the 7 mg nicotine patch to help with cravings and requests a refill today.    Venous stasis ulcer of left lower extremity (CMS-HCC)  He was recently hospitalized after the wound became infected.  He now has a wound vac in place and is going to wound care three times a week.  His daughter is trying to set up RTC transportation for him as she is unable to take him to all his appointments with her work schedule.    Anxiety  He continues on the celexa and propranolol which has been helping his anxiety.  However, he and his daughter do not feel he can safely take public transportation.  He gets very anxious in unfamiliar places with new directions and does not feel he would be able to get off at the right bus stop or find his way to his numerous doctors appointments due to his anxiety.  He also has trouble remembering directions when he is anxious.  They have brought RTC paperwork in today for completion.    Chronic hepatitis C without hepatic coma (HCC)  He has not been able to get treated yet.  His urine drug screen done for insurance approval of the hep c treatment was positive for amphetamine and marijuana.  He and his daughter are aware that he needs to have a normal urine drug test before he can be treated.    Methamphetamine abuse (HCC)  Discussed very sincerely with patient the need to avoid methamphetamine use entirely.  He admits to using by smoking but is unable to quantify the amount.    Lung nodule  He had a PET scan done while hospitalized which was negative.  He saw pulm yesterday who felt the area was resolving scar vs infection with no further follow up required.    HTN (hypertension)  His amlodipine was increased during his most recent hospital stay to 10  "mg daily.  BP in clinic today was 118/70         Current medicines (including changes today)  Current Outpatient Prescriptions   Medication Sig Dispense Refill   • nicotine (NICODERM) 7 MG/24HR PATCH 24 HR Apply 1 Patch to skin as directed every 24 hours. 30 Patch 0   • amLODIPine (NORVASC) 10 MG Tab Take 1 Tab by mouth every day. 30 Tab 5   • ferrous sulfate 325 (65 Fe) MG tablet Take 1 Tab by mouth every day. 30 Tab 5   • citalopram (CELEXA) 20 MG Tab Take 1 Tab by mouth every day. 30 Tab 5   • propranolol (INDERAL) 10 MG Tab Take 1 Tab by mouth 2 times a day. 60 Tab 3   • aspirin EC (ECOTRIN) 81 MG Tablet Delayed Response Take 1 Tab by mouth every day. 30 Tab 11   • atorvastatin (LIPITOR) 40 MG Tab Take 1 Tab by mouth every bedtime. 90 Tab 3     No current facility-administered medications for this visit.      He  has a past medical history of Anxiety; Charcot's joint of left foot, non-diabetic (3/21/2016); Chronic congestive heart failure (HCC) (11/16/2017); Hepatitis C, chronic (HCC); Hypertension; Migraine; Polysubstance abuse (HCC) (3/8/2018); Tobacco use (4/18/2016); Ulcer of left lower extremity with necrosis of muscle (HCC) (3/21/2016); and Venous stasis ulcer ([facility]) (2017).    ROS   Denies chest pain, shortness o fbreath  As above in HPI     Objective:     Blood pressure 118/70, pulse 64, temperature 37.1 °C (98.8 °F), resp. rate 16, height 1.829 m (6' 0.01\"), weight 80.3 kg (177 lb), SpO2 95 %. Body mass index is 24 kg/m².   Physical Exam:  Constitutional: Alert, no distress.  Skin: Warm, dry, good turgor, no rashes in visible areas.  Eye: Equal, round and reactive, conjunctiva clear, lids normal.  Psych: Alert and oriented x3, normal affect and mood.  Extrem: LLE with wound vac in place, slight surrounding erythema, no tenderness      Assessment and Plan:   The following treatment plan was discussed    1. Tobacco use  Currently not smoking, have refilled patches at patient's request  - nicotine " (NICODERM) 7 MG/24HR PATCH 24 HR; Apply 1 Patch to skin as directed every 24 hours.  Dispense: 30 Patch; Refill: 0    2. Venous stasis ulcer of left lower extremity (HCC)  Now with wound vac, will need continued care through the wound clinic    3. Anxiety  Partially controlled but may be prohibitive in terms of taking public transportation.  RTC paperwork completed today.  Cont current meds    4. Chronic hepatitis C without hepatic coma (HCC)  Unable to treat until urine drug screen is normal.  Cont GI follow up.    5. Methamphetamine abuse (HCC)  Strongly encouraged cessation.    6. Lung nodule  Likely scar, no further f/u per pulm.    7. Essential hypertension  Stable, controlled with current meds which were refilled today.  - amLODIPine (NORVASC) 10 MG Tab; Take 1 Tab by mouth every day.  Dispense: 30 Tab; Refill: 5        Followup: Return in about 2 months (around 3/23/2019), or if symptoms worsen or fail to improve.

## 2019-01-24 NOTE — ASSESSMENT & PLAN NOTE
He was recently hospitalized after the wound became infected.  He now has a wound vac in place and is going to wound care three times a week.  His daughter is trying to set up RTC transportation for him as she is unable to take him to all his appointments with her work schedule.

## 2019-01-24 NOTE — PROCEDURES
Technician: Danielle Cat [facility]  Tech notes:  Good pt effort and cooperation. ATS standards met.  Ventolin HFA 2 puffs via spacer administered.    Interpretation:  Acceptable and reproducible  FEV1 2.86 L [76%], FVC 3.97 L[79%], ratio 72%  Flow volume loops concavity of the expiratory loop suggestive peripheral air obstruction  Impression:  Normal spirometry with no response to bronchodilators

## 2019-01-24 NOTE — ASSESSMENT & PLAN NOTE
He has not been able to get treated yet.  His urine drug screen done for insurance approval of the hep c treatment was positive for amphetamine and marijuana.  He and his daughter are aware that he needs to have a normal urine drug test before he can be treated.

## 2019-01-24 NOTE — ASSESSMENT & PLAN NOTE
Discussed very sincerely with patient the need to avoid methamphetamine use entirely.  He admits to using by smoking but is unable to quantify the amount.

## 2019-01-24 NOTE — ASSESSMENT & PLAN NOTE
He continues on the celexa and propranolol which has been helping his anxiety.  However, he and his daughter do not feel he can safely take public transportation.  He gets very anxious in unfamiliar places with new directions and does not feel he would be able to get off at the right bus stop or find his way to his numerous doctors appointments due to his anxiety.  He also has trouble remembering directions when he is anxious.  They have brought RTC paperwork in today for completion.

## 2019-01-24 NOTE — ASSESSMENT & PLAN NOTE
Last cigarette was about 1 month ago.  He continues to use the 7 mg nicotine patch to help with cravings and requests a refill today.

## 2019-01-24 NOTE — ASSESSMENT & PLAN NOTE
He had a PET scan done while hospitalized which was negative.  He saw pulm yesterday who felt the area was resolving scar vs infection with no further follow up required.

## 2019-01-25 ENCOUNTER — NON-PROVIDER VISIT (OUTPATIENT)
Dept: WOUND CARE | Facility: MEDICAL CENTER | Age: 64
End: 2019-01-25
Attending: FAMILY MEDICINE
Payer: MEDICAID

## 2019-01-25 PROCEDURE — 97605 NEG PRS WND THER DME<=50SQCM: CPT

## 2019-01-25 PROCEDURE — 97602 WOUND(S) CARE NON-SELECTIVE: CPT

## 2019-01-25 NOTE — PATIENT INSTRUCTIONS
Wound VAC at 125mmHg continuous or intermittent with 2 foam. Dressing will be changed every MWF at the wound clinic or with your home health nurse.  If you are having issues with your wound VAC, please consider patching leaks, changing the canister, or calling [phone] for troubleshooting. If the wound VAC has been off or un-operational for over 2 hours, call wound care center to inform them and remove all dressings including black foam and replace with normal saline damp gauze.     Should you experience any significant changes in your wound(s), such as infection (redness, swelling, localized heat, increased pain, fever > 101 F, chills) or have any questions regarding your home care instructions, please contact the wound center at [phone]. If after hours, contact your primary care physician or go to the hospital emergency room.   Keep dressing clean, dry and cover when bathing. Only change dressing if it's over saturated, soiled or falls off.

## 2019-01-25 NOTE — PROCEDURES
Non-selective debridement with moist gauze of Dakin's 1/4 strength to clean non-viable biofilm off & rinse with NS. Reapplied NPWT with moist honorio beneath to seal at 125 mmHg continuous.

## 2019-01-28 ENCOUNTER — OFFICE VISIT (OUTPATIENT)
Dept: WOUND CARE | Facility: MEDICAL CENTER | Age: 64
End: 2019-01-28
Attending: FAMILY MEDICINE
Payer: MEDICAID

## 2019-01-28 VITALS
DIASTOLIC BLOOD PRESSURE: 84 MMHG | OXYGEN SATURATION: 97 % | TEMPERATURE: 98.5 F | RESPIRATION RATE: 16 BRPM | HEART RATE: 54 BPM | SYSTOLIC BLOOD PRESSURE: 122 MMHG

## 2019-01-28 DIAGNOSIS — Z72.0 TOBACCO USE: ICD-10-CM

## 2019-01-28 DIAGNOSIS — L97.922 SKIN ULCER OF LEFT LOWER LEG WITH FAT LAYER EXPOSED (HCC): Primary | ICD-10-CM

## 2019-01-28 DIAGNOSIS — I87.8 CHRONIC VENOUS STASIS: ICD-10-CM

## 2019-01-28 PROCEDURE — 11042 DBRDMT SUBQ TIS 1ST 20SQCM/<: CPT | Performed by: NURSE PRACTITIONER

## 2019-01-28 PROCEDURE — 11042 DBRDMT SUBQ TIS 1ST 20SQCM/<: CPT

## 2019-01-28 ASSESSMENT — ENCOUNTER SYMPTOMS
EYES NEGATIVE: 1
FEVER: 0
SHORTNESS OF BREATH: 0
PALPITATIONS: 0
WHEEZING: 0
FALLS: 0
CHILLS: 0
COUGH: 0
NAUSEA: 0
WEAKNESS: 0
VOMITING: 0
DIAPHORESIS: 0
CLAUDICATION: 0
NERVOUS/ANXIOUS: 0
BACK PAIN: 0
DEPRESSION: 0

## 2019-01-28 ASSESSMENT — PAIN SCALES - GENERAL: PAINLEVEL: 3=SLIGHT PAIN

## 2019-01-28 NOTE — PATIENT INSTRUCTIONS
Should you experience any significant changes in your wound(s) such as infection (redness, swelling, localized heat, increased pain, fever >101 F, chills) or have any questions regarding your home care instructions, please contact the wound center [phone]. If after hours, contact your primary care physician or go the hospital emergency room.  Keep dressing clean and dry and cover while bathing. Only change dressing if over saturated, soiled or its falling off.
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
## 2019-01-30 ENCOUNTER — NON-PROVIDER VISIT (OUTPATIENT)
Dept: WOUND CARE | Facility: MEDICAL CENTER | Age: 64
End: 2019-01-30
Attending: FAMILY MEDICINE
Payer: MEDICAID

## 2019-01-30 PROCEDURE — 97605 NEG PRS WND THER DME<=50SQCM: CPT

## 2019-01-31 NOTE — PATIENT INSTRUCTIONS
-Keep your wound dressing clean, dry, and intact.    -Wound vac may not have any drainage in tube or cannister & it will still be working.   Change cannister if it does become full by pressing tab on side of machine to remove canister and snap on new one. Full canister can be thrown in the trash. If cannister fills with bright red blood - go to ER. Dressing will be changed every MWF at the wound clini.  If you are having issues with your wound VAC, please consider patching leaks, changing the canister, or calling [phone] for troubleshooting. If the wound VAC has been off or un-operational for over 2 hours, call wound care center to inform them and remove all dressings including black foam and replace with normal saline damp gauze.     -Should you experience any significant changes in your wound(s), such as infection (redness, swelling, localized heat, increased pain, fever > 101 F, chills) or have any questions regarding your home care instructions, please contact the wound center at [phone]. If after hours, contact your primary care physician or go to the hospital emergency room.

## 2019-01-31 NOTE — PROCEDURES
Non selective with NS and gauze to remove non viable tissue from wound bed. NPWT resumed at 125 mmHg continuous with no leaks noted.

## 2019-02-01 ENCOUNTER — NON-PROVIDER VISIT (OUTPATIENT)
Dept: WOUND CARE | Facility: MEDICAL CENTER | Age: 64
End: 2019-02-01
Attending: FAMILY MEDICINE
Payer: MEDICAID

## 2019-02-01 PROCEDURE — 97597 DBRDMT OPN WND 1ST 20 CM/<: CPT

## 2019-02-01 NOTE — PATIENT INSTRUCTIONS
Wound VAC at 125mmHg continuous or intermittent with 1 foam. Dressing will be changed every MWF at the wound clinic or with your home health nurse.  If you are having issues with your wound VAC, please consider patching leaks, changing the canister, or calling [phone] for troubleshooting. If the wound VAC has been off or un-operational for over 2 hours, call wound care center to inform them and remove all dressings including black foam and replace with normal saline damp gauze.     Should you experience any significant changes in your wound(s), such as infection (redness, swelling, localized heat, increased pain, fever > 101 F, chills) or have any questions regarding your home care instructions, please contact the wound center at [phone]. If after hours, contact your primary care physician or go to the hospital emergency room.   Keep dressing clean, dry and cover when bathing. Only change dressing if it's over saturated, soiled or falls off.

## 2019-02-01 NOTE — PROCEDURES
CSWD with scalpel to remove non-viable slough layer from wound bed on lower leg of ~2cm2 post application of topical lidocaine. NPWT reapplied.

## 2019-02-04 ENCOUNTER — OFFICE VISIT (OUTPATIENT)
Dept: WOUND CARE | Facility: MEDICAL CENTER | Age: 64
End: 2019-02-04
Attending: FAMILY MEDICINE
Payer: MEDICAID

## 2019-02-04 VITALS
DIASTOLIC BLOOD PRESSURE: 94 MMHG | RESPIRATION RATE: 18 BRPM | HEART RATE: 51 BPM | TEMPERATURE: 97.7 F | SYSTOLIC BLOOD PRESSURE: 132 MMHG | OXYGEN SATURATION: 95 %

## 2019-02-04 DIAGNOSIS — Z72.0 TOBACCO USE: ICD-10-CM

## 2019-02-04 DIAGNOSIS — I87.8 CHRONIC VENOUS STASIS: ICD-10-CM

## 2019-02-04 DIAGNOSIS — M20.42 HAMMERTOE OF LEFT FOOT: ICD-10-CM

## 2019-02-04 DIAGNOSIS — G62.9 PERIPHERAL POLYNEUROPATHY: ICD-10-CM

## 2019-02-04 DIAGNOSIS — L97.922 SKIN ULCER OF LEFT LOWER LEG WITH FAT LAYER EXPOSED (HCC): ICD-10-CM

## 2019-02-04 DIAGNOSIS — M14.672 CHARCOT'S JOINT OF LEFT FOOT, NON-DIABETIC: ICD-10-CM

## 2019-02-04 PROCEDURE — 11042 DBRDMT SUBQ TIS 1ST 20SQCM/<: CPT

## 2019-02-04 PROCEDURE — 99212 OFFICE O/P EST SF 10 MIN: CPT | Mod: 25 | Performed by: NURSE PRACTITIONER

## 2019-02-04 PROCEDURE — 11042 DBRDMT SUBQ TIS 1ST 20SQCM/<: CPT | Performed by: NURSE PRACTITIONER

## 2019-02-04 ASSESSMENT — ENCOUNTER SYMPTOMS
PALPITATIONS: 0
DIAPHORESIS: 0
EYES NEGATIVE: 1
FALLS: 0
WEAKNESS: 0
CLAUDICATION: 0
FEVER: 0
NERVOUS/ANXIOUS: 0
NAUSEA: 0
CHILLS: 0
BACK PAIN: 0
SHORTNESS OF BREATH: 0
COUGH: 0
VOMITING: 0
WHEEZING: 0
DEPRESSION: 0
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
## 2019-02-06 ENCOUNTER — NON-PROVIDER VISIT (OUTPATIENT)
Dept: WOUND CARE | Facility: MEDICAL CENTER | Age: 64
End: 2019-02-06
Attending: FAMILY MEDICINE
Payer: MEDICAID

## 2019-02-06 PROCEDURE — 97597 DBRDMT OPN WND 1ST 20 CM/<: CPT

## 2019-02-06 NOTE — PATIENT INSTRUCTIONS
-Keep dressings clean, dry and covered while bathing. Only change dressings if they become over saturated, soiled or fall off.     -Avoid prolonged standing or sitting without elevating your legs.    -Remove your compression garments if you have severe pain, severe swelling, numbness, color change, or temperature change in your toes. If you need to remove your compression garments, do so by unrolling them. Do not cut the compression garments off, this is to prevent cutting yourself on accident.    -Should you experience any significant changes in your wound(s), such as infection (redness, swelling, localized heat, increased pain, fever > 101 F, chills) or have any questions regarding your home care instructions, please contact the wound center at [phone]. If after hours, contact your primary care physician or go to the hospital emergency room.

## 2019-02-06 NOTE — PROCEDURES
Wound Care Procedures 2/6/2019:  CSWD with scalpel to remove ~10 cm2 slough and biofilm from LLE wound.

## 2019-02-08 ENCOUNTER — NON-PROVIDER VISIT (OUTPATIENT)
Dept: WOUND CARE | Facility: MEDICAL CENTER | Age: 64
End: 2019-02-08
Attending: FAMILY MEDICINE
Payer: MEDICAID

## 2019-02-08 PROCEDURE — 97602 WOUND(S) CARE NON-SELECTIVE: CPT

## 2019-02-08 NOTE — PATIENT INSTRUCTIONS
Should you experience any significant changes in your wound(s) such as infection (redness, swelling, localized heat, increased pain, fever >101 F, chills) or have any questions regarding your home care instructions, please contact the wound center [phone]. If after hours, contact your primary care physician or go the hospital emergency room.  Keep dressing clean and dry and cover while bathing. Only change dressing if over saturated, soiled or its falling off.

## 2019-02-11 ENCOUNTER — OFFICE VISIT (OUTPATIENT)
Dept: WOUND CARE | Facility: MEDICAL CENTER | Age: 64
End: 2019-02-11
Attending: FAMILY MEDICINE
Payer: MEDICAID

## 2019-02-11 VITALS
DIASTOLIC BLOOD PRESSURE: 92 MMHG | HEART RATE: 63 BPM | TEMPERATURE: 97.7 F | OXYGEN SATURATION: 95 % | SYSTOLIC BLOOD PRESSURE: 133 MMHG | RESPIRATION RATE: 18 BRPM

## 2019-02-11 PROCEDURE — 97605 NEG PRS WND THER DME<=50SQCM: CPT
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
## 2019-02-12 NOTE — PATIENT INSTRUCTIONS
Should you experience any significant changes in your wound(s) such as infection (redness, swelling, localized heat, increased pain, fever >101 F, chills) or have any questions regarding your home care instructions, please contact the wound center [phone]. If after hours, contact your primary care physician or go the hospital emergency room.  Keep dressing clean and dry and cover while bathing. Only change dressing if over saturated, soiled or its falling off.

## 2019-02-12 NOTE — PROCEDURES
NPWT re-applied due to presence of moderate - heavy drainage sitting on wound bed. Patient was seen by myself and Dr. Gibson today for evaluation, and patient to follow-up with Paige fr possible procedures to assist in wound healing, see her note.

## 2019-02-13 ENCOUNTER — NON-PROVIDER VISIT (OUTPATIENT)
Dept: WOUND CARE | Facility: MEDICAL CENTER | Age: 64
End: 2019-02-13
Attending: FAMILY MEDICINE
Payer: MEDICAID

## 2019-02-13 PROCEDURE — 97605 NEG PRS WND THER DME<=50SQCM: CPT

## 2019-02-13 NOTE — PATIENT INSTRUCTIONS
Wound VAC at 125mmHg continuous or intermittent with 1 foam. Dressing will be changed every MWF at the wound clinic or with your home health nurse.  If you are having issues with your wound VAC, please consider patching leaks, changing the canister, or calling [phone] for troubleshooting. If the wound VAC has been off or un-operational for over 2 hours, call wound care center to inform them and remove all dressings including black foam and replace with normal saline damp gauze.     Should you experience any significant changes in your wound(s), such as infection (redness, swelling, localized heat, increased pain, fever > 101 F, chills) or have any questions regarding your home care instructions, please contact the wound center at [phone]. If after hours, contact your primary care physician or go to the hospital emergency room.   Keep dressing clean, dry and cover when bathing. Only change dressing if it's over saturated, soiled or falls off.

## 2019-02-15 ENCOUNTER — NON-PROVIDER VISIT (OUTPATIENT)
Dept: WOUND CARE | Facility: MEDICAL CENTER | Age: 64
End: 2019-02-15
Attending: FAMILY MEDICINE
Payer: MEDICAID

## 2019-02-15 PROBLEM — K21.00 GASTROESOPHAGEAL REFLUX DISEASE WITH ESOPHAGITIS: Status: ACTIVE | Noted: 2019-02-15

## 2019-02-15 PROBLEM — K29.60 EROSIVE GASTRITIS: Status: ACTIVE | Noted: 2019-02-15

## 2019-02-15 PROCEDURE — 97605 NEG PRS WND THER DME<=50SQCM: CPT

## 2019-02-15 NOTE — PATIENT INSTRUCTIONS
Wound VAC at 125mmHg continuous with 1 black foam. Dressing will be changed every MWF at the wound clinic or with your home health nurse.  If you are having issues with your wound VAC, please consider patching leaks, changing the canister, or calling [phone] for troubleshooting. If the wound VAC has been off or un-operational for over 2 hours, call wound care center to inform them and remove all dressings including black foam and replace with normal saline damp gauze.     Keep dressing clean and dry and cover while bathing. Only change dressing if over saturated, soiled or its falling off.     Should you experience any significant changes in your wound(s) such as infection (redness, swelling, localized heat, increased pain, fever >101 F, chills) or have any questions regarding your home care instructions, please contact the wound center [phone]. If after hours, contact your primary care physician or go the hospital emergency room.

## 2019-02-18 ENCOUNTER — OFFICE VISIT (OUTPATIENT)
Dept: WOUND CARE | Facility: MEDICAL CENTER | Age: 64
End: 2019-02-18
Attending: FAMILY MEDICINE
Payer: MEDICAID

## 2019-02-18 VITALS
OXYGEN SATURATION: 95 % | TEMPERATURE: 97.5 F | RESPIRATION RATE: 18 BRPM | SYSTOLIC BLOOD PRESSURE: 142 MMHG | DIASTOLIC BLOOD PRESSURE: 96 MMHG | HEART RATE: 55 BPM

## 2019-02-18 DIAGNOSIS — M20.42 HAMMERTOE OF LEFT FOOT: ICD-10-CM

## 2019-02-18 DIAGNOSIS — L97.922 SKIN ULCER OF LEFT LOWER LEG WITH FAT LAYER EXPOSED (HCC): ICD-10-CM

## 2019-02-18 DIAGNOSIS — I83.029 VENOUS STASIS ULCER OF LEFT LOWER EXTREMITY (HCC): ICD-10-CM

## 2019-02-18 DIAGNOSIS — Z72.0 TOBACCO USE: ICD-10-CM

## 2019-02-18 DIAGNOSIS — G62.9 PERIPHERAL POLYNEUROPATHY: ICD-10-CM

## 2019-02-18 DIAGNOSIS — M14.672 CHARCOT'S JOINT OF LEFT FOOT, NON-DIABETIC: ICD-10-CM

## 2019-02-18 DIAGNOSIS — L97.929 VENOUS STASIS ULCER OF LEFT LOWER EXTREMITY (HCC): ICD-10-CM

## 2019-02-18 DIAGNOSIS — I87.8 CHRONIC VENOUS STASIS: ICD-10-CM

## 2019-02-18 PROCEDURE — 99214 OFFICE O/P EST MOD 30 MIN: CPT | Performed by: NURSE PRACTITIONER

## 2019-02-18 PROCEDURE — 99213 OFFICE O/P EST LOW 20 MIN: CPT

## 2019-02-18 ASSESSMENT — ENCOUNTER SYMPTOMS
WEAKNESS: 0
COUGH: 0
DEPRESSION: 0
FALLS: 0
VOMITING: 0
EYES NEGATIVE: 1
DIAPHORESIS: 0
CLAUDICATION: 0
SHORTNESS OF BREATH: 0
NAUSEA: 0
WHEEZING: 0
FEVER: 0
NERVOUS/ANXIOUS: 0
CHILLS: 0
BACK PAIN: 0
PALPITATIONS: 0
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
## 2019-02-18 NOTE — PATIENT INSTRUCTIONS
Please obtain venous study. You should hear from them in about 3 days.    Please take AFO boot to [facility] to have it adjusted.    Follow up with [facility] for foot and nail care [phone] with Hansa. (Wed. 8-3)    Avoid prolonged standing or sitting without elevating your legs.    - Multilayer compression wrap to left leg. Do not get wet and keep on for the week. Only remove if temperature or sensation changes.    Keep dressing clean and dry and cover while bathing. Only change dressing if over saturated, soiled or its falling off.     Should you experience any significant changes in your wound(s) such as infection (redness, swelling, localized heat, increased pain, fever >101 F, chills) or have any questions regarding your home care instructions, please contact the wound center [phone]. If after hours, contact your primary care physician or go the hospital emergency room.

## 2019-02-19 ENCOUNTER — HOSPITAL ENCOUNTER (OUTPATIENT)
Dept: LAB | Facility: MEDICAL CENTER | Age: 64
End: 2019-02-19
Attending: INTERNAL MEDICINE
Payer: MEDICAID

## 2019-02-19 DIAGNOSIS — F41.9 ANXIETY: ICD-10-CM

## 2019-02-19 DIAGNOSIS — I10 ESSENTIAL HYPERTENSION: ICD-10-CM

## 2019-02-19 PROCEDURE — 80307 DRUG TEST PRSMV CHEM ANLYZR: CPT

## 2019-02-20 ENCOUNTER — NON-PROVIDER VISIT (OUTPATIENT)
Dept: WOUND CARE | Facility: MEDICAL CENTER | Age: 64
End: 2019-02-20
Attending: FAMILY MEDICINE
Payer: MEDICAID

## 2019-02-20 PROCEDURE — 97605 NEG PRS WND THER DME<=50SQCM: CPT

## 2019-02-20 RX ORDER — PROPRANOLOL HYDROCHLORIDE 10 MG/1
TABLET ORAL
Qty: 180 TAB | Refills: 3 | Status: SHIPPED
Start: 2019-02-20 | End: 2019-12-26

## 2019-02-20 NOTE — PROCEDURES
Non selective with NS and gauze to remove non viable tissue from wound bed. NPWT <50 cm restarted with no leaks @ 125 mmHg

## 2019-02-20 NOTE — PATIENT INSTRUCTIONS
Should you experience any significant changes in your wound(s) such as infection (redness, swelling, localized heat, increased pain, fever >101 F, chills) or have any questions regarding your home care instructions, please contact the wound center [phone]. If after hours, contact your primary care physician or go the hospital emergency room.  Keep dressing clean and dry and cover while bathing. Only change dressing if over saturated, soiled or its falling off.

## 2019-02-21 LAB
AMPHETAMINES UR QL: NEGATIVE NG/ML
BARBITURATES UR QL: NEGATIVE NG/ML
BENZODIAZ UR QL: NEGATIVE NG/ML
CANNABINOIDS UR QL SCN: POSITIVE NG/ML
COCAINE UR QL: NEGATIVE NG/ML
DRUG SCREEN COMMENT UR-IMP: NORMAL
MDMA CTO UR SCN-MCNC: NEGATIVE NG/ML
METHADONE UR QL: NEGATIVE NG/ML
OPIATES UR QL: NEGATIVE NG/ML
OXYCODONE CTO UR SCN-MCNC: NEGATIVE NG/ML
PCP UR QL SCN: NEGATIVE NG/ML
PROPOXYPH UR QL: NEGATIVE NG/ML

## 2019-02-22 ENCOUNTER — NON-PROVIDER VISIT (OUTPATIENT)
Dept: WOUND CARE | Facility: MEDICAL CENTER | Age: 64
End: 2019-02-22
Attending: FAMILY MEDICINE
Payer: MEDICAID

## 2019-02-22 PROCEDURE — 97605 NEG PRS WND THER DME<=50SQCM: CPT

## 2019-02-22 NOTE — PATIENT INSTRUCTIONS
Wound VAC at 125mmHg continuous with 1 black foam. Dressing will be changed every MWF at the wound clinic or with your home health nurse.  If you are having issues with your wound VAC, please consider patching leaks, changing the canister, or calling [phone] for troubleshooting. If the wound VAC has been off or un-operational for over 2 hours, call wound care center to inform them and remove all dressings including black foam and replace with normal saline damp gauze.     Keep dressing clean and dry and cover while bathing. Only change dressing if over saturated, soiled or its falling off.     Should you experience any significant changes in your wound(s) such as infection (redness, swelling, localized heat, increased pain, fever >101 F, chills) or have any questions regarding your home care instructions, please contact the wound center [phone]. If after hours, contact your primary care physician or go the hospital emergency room.

## 2019-02-25 ENCOUNTER — NON-PROVIDER VISIT (OUTPATIENT)
Dept: WOUND CARE | Facility: MEDICAL CENTER | Age: 64
End: 2019-02-25
Attending: FAMILY MEDICINE
Payer: MEDICAID

## 2019-02-25 PROCEDURE — 97605 NEG PRS WND THER DME<=50SQCM: CPT

## 2019-02-25 NOTE — PATIENT INSTRUCTIONS
Should you experience any significant changes in your wound(s) such as infection (redness, swelling, localized heat, increased pain, fever >101 F, chills) or have any questions regarding your home care instructions, please contact the wound center [phone]. If after hours, contact your primary care physician or go the hospital emergency room.  Keep dressing clean and dry and cover while bathing. Only change dressing if over saturated, soiled or its falling off.

## 2019-02-27 ENCOUNTER — HOSPITAL ENCOUNTER (OUTPATIENT)
Dept: RADIOLOGY | Facility: MEDICAL CENTER | Age: 64
End: 2019-02-27
Attending: NURSE PRACTITIONER
Payer: MEDICAID

## 2019-02-27 ENCOUNTER — OFFICE VISIT (OUTPATIENT)
Dept: WOUND CARE | Facility: MEDICAL CENTER | Age: 64
End: 2019-02-27
Attending: FAMILY MEDICINE
Payer: MEDICAID

## 2019-02-27 VITALS
DIASTOLIC BLOOD PRESSURE: 90 MMHG | HEART RATE: 80 BPM | SYSTOLIC BLOOD PRESSURE: 136 MMHG | OXYGEN SATURATION: 96 % | TEMPERATURE: 98.4 F | RESPIRATION RATE: 18 BRPM

## 2019-02-27 DIAGNOSIS — M79.672 PAIN OF LEFT HEEL: ICD-10-CM

## 2019-02-27 DIAGNOSIS — Z72.0 TOBACCO USE: ICD-10-CM

## 2019-02-27 DIAGNOSIS — L97.922 SKIN ULCER OF LEFT LOWER LEG WITH FAT LAYER EXPOSED (HCC): Primary | ICD-10-CM

## 2019-02-27 DIAGNOSIS — L97.929 VENOUS STASIS ULCER OF LEFT LOWER EXTREMITY (HCC): ICD-10-CM

## 2019-02-27 DIAGNOSIS — M14.672 CHARCOT'S JOINT OF LEFT FOOT, NON-DIABETIC: ICD-10-CM

## 2019-02-27 DIAGNOSIS — I83.029 VENOUS STASIS ULCER OF LEFT LOWER EXTREMITY (HCC): ICD-10-CM

## 2019-02-27 DIAGNOSIS — G62.9 PERIPHERAL POLYNEUROPATHY: ICD-10-CM

## 2019-02-27 PROCEDURE — 11042 DBRDMT SUBQ TIS 1ST 20SQCM/<: CPT

## 2019-02-27 PROCEDURE — 11042 DBRDMT SUBQ TIS 1ST 20SQCM/<: CPT | Performed by: NURSE PRACTITIONER

## 2019-02-27 PROCEDURE — 73650 X-RAY EXAM OF HEEL: CPT | Mod: LT

## 2019-02-27 ASSESSMENT — ENCOUNTER SYMPTOMS
FALLS: 0
DIAPHORESIS: 0
WEAKNESS: 0
PALPITATIONS: 0
CHILLS: 0
VOMITING: 0
SHORTNESS OF BREATH: 0
NERVOUS/ANXIOUS: 0
WHEEZING: 0
BACK PAIN: 0
FEVER: 0
COUGH: 0
EYES NEGATIVE: 1
DEPRESSION: 0
CLAUDICATION: 0
NAUSEA: 0
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
## 2019-02-27 NOTE — PATIENT INSTRUCTIONS
-Keep dressings clean, dry and covered while bathing. Only change dressings if they become over saturated, soiled or fall off.     -Avoid prolonged standing or sitting without elevating your legs.    -Remove your compression garments if you have severe pain, severe swelling, numbness, color change, or temperature change in your toes. If you need to remove your compression garments, do so by unrolling them. Do not cut the compression garments off, this is to prevent cutting yourself on accident.    -Should you experience any significant changes in your wound(s), such as infection (redness, swelling, localized heat, increased pain, fever > 101 F, chills) or have any questions regarding your home care instructions, please contact the wound center at [phone]. If after hours, contact your primary care physician or go to the hospital emergency room.

## 2019-03-01 ENCOUNTER — APPOINTMENT (OUTPATIENT)
Dept: WOUND CARE | Facility: MEDICAL CENTER | Age: 64
End: 2019-03-01
Attending: FAMILY MEDICINE
Payer: MEDICAID

## 2019-03-04 ENCOUNTER — OFFICE VISIT (OUTPATIENT)
Dept: WOUND CARE | Facility: MEDICAL CENTER | Age: 64
End: 2019-03-04
Attending: FAMILY MEDICINE
Payer: MEDICAID

## 2019-03-04 ENCOUNTER — HOSPITAL ENCOUNTER (OUTPATIENT)
Facility: MEDICAL CENTER | Age: 64
End: 2019-03-04
Attending: NURSE PRACTITIONER
Payer: MEDICAID

## 2019-03-04 VITALS
TEMPERATURE: 98.3 F | SYSTOLIC BLOOD PRESSURE: 144 MMHG | DIASTOLIC BLOOD PRESSURE: 101 MMHG | HEART RATE: 79 BPM | OXYGEN SATURATION: 97 % | RESPIRATION RATE: 20 BRPM

## 2019-03-04 DIAGNOSIS — I87.8 CHRONIC VENOUS STASIS: ICD-10-CM

## 2019-03-04 DIAGNOSIS — L08.9 WOUND INFECTION: ICD-10-CM

## 2019-03-04 DIAGNOSIS — Z72.0 TOBACCO USE: ICD-10-CM

## 2019-03-04 DIAGNOSIS — L97.922 SKIN ULCER OF LEFT LOWER LEG WITH FAT LAYER EXPOSED (HCC): Primary | ICD-10-CM

## 2019-03-04 DIAGNOSIS — T14.8XXA WOUND INFECTION: ICD-10-CM

## 2019-03-04 DIAGNOSIS — M79.672 LEFT FOOT PAIN: ICD-10-CM

## 2019-03-04 DIAGNOSIS — G62.9 PERIPHERAL POLYNEUROPATHY: ICD-10-CM

## 2019-03-04 DIAGNOSIS — M14.672 CHARCOT'S JOINT OF LEFT FOOT, NON-DIABETIC: ICD-10-CM

## 2019-03-04 PROCEDURE — 87205 SMEAR GRAM STAIN: CPT

## 2019-03-04 PROCEDURE — 87070 CULTURE OTHR SPECIMN AEROBIC: CPT

## 2019-03-04 PROCEDURE — 11042 DBRDMT SUBQ TIS 1ST 20SQCM/<: CPT

## 2019-03-04 PROCEDURE — 87186 SC STD MICRODIL/AGAR DIL: CPT

## 2019-03-04 PROCEDURE — 87077 CULTURE AEROBIC IDENTIFY: CPT | Mod: 91

## 2019-03-04 PROCEDURE — 11042 DBRDMT SUBQ TIS 1ST 20SQCM/<: CPT | Performed by: NURSE PRACTITIONER

## 2019-03-04 ASSESSMENT — ENCOUNTER SYMPTOMS
WHEEZING: 0
DEPRESSION: 0
FEVER: 0
PALPITATIONS: 0
NAUSEA: 0
WEAKNESS: 0
BACK PAIN: 0
COUGH: 0
CHILLS: 0
EYES NEGATIVE: 1
FALLS: 0
CLAUDICATION: 0
NERVOUS/ANXIOUS: 0
SHORTNESS OF BREATH: 0
VOMITING: 0
DIAPHORESIS: 0

## 2019-03-04 ASSESSMENT — PAIN SCALES - GENERAL: PAINLEVEL: 6=MODERATE PAIN

## 2019-03-04 NOTE — PATIENT INSTRUCTIONS
Avoid prolonged standing or sitting without elevating your legs.  - Multilayer compression wrap to left leg. Do not get wet and keep on for the week. Only remove if temperature or sensation changes.   If compression needs to be removed, un-wrap it do not cut it off.     Should you experience any significant changes in your wound(s), such as infection (redness, swelling, localized heat, increased pain, fever > 101 F, chills) or have any questions regarding your home care instructions, please contact the wound center at [phone]. If after hours, contact your primary care physician or go to the hospital emergency room.   Keep dressing clean, dry and covered while bathing. Only change dressing if it becomes over saturated, soiled or falls off.
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
## 2019-03-05 LAB
GRAM STN SPEC: NORMAL
SIGNIFICANT IND 70042: NORMAL
SITE SITE: NORMAL
SOURCE SOURCE: NORMAL

## 2019-03-08 DIAGNOSIS — T14.8XXA WOUND INFECTION: ICD-10-CM

## 2019-03-08 DIAGNOSIS — L08.9 WOUND INFECTION: ICD-10-CM

## 2019-03-08 RX ORDER — SULFAMETHOXAZOLE AND TRIMETHOPRIM 800; 160 MG/1; MG/1
1 TABLET ORAL 2 TIMES DAILY
Qty: 20 TAB | Refills: 0 | Status: SHIPPED | OUTPATIENT
Start: 2019-03-08 | End: 2019-03-18

## 2019-03-08 RX ORDER — AMOXICILLIN 500 MG/1
500 CAPSULE ORAL 3 TIMES DAILY
Qty: 30 CAP | Refills: 0 | Status: SHIPPED | OUTPATIENT
Start: 2019-03-08 | End: 2019-03-18

## 2019-03-08 NOTE — PROGRESS NOTES
Positive wound culture for MRSA, E faecalis, yeast, strep group B to left lower leg ulcer.  Reviewed allergies, medications, chemistry, CBC panels.  Reviewed past wound photos.  Ulcer has significantly worsened.  Prescribed amoxicillin 3 times daily for 10 days and Bactrim twice daily for 10 days.  Contacted patient, antonino left.

## 2019-03-11 ENCOUNTER — OFFICE VISIT (OUTPATIENT)
Dept: WOUND CARE | Facility: MEDICAL CENTER | Age: 64
End: 2019-03-11
Attending: FAMILY MEDICINE
Payer: MEDICAID

## 2019-03-11 VITALS
TEMPERATURE: 97.8 F | HEART RATE: 58 BPM | DIASTOLIC BLOOD PRESSURE: 96 MMHG | RESPIRATION RATE: 16 BRPM | SYSTOLIC BLOOD PRESSURE: 160 MMHG | OXYGEN SATURATION: 96 %

## 2019-03-11 DIAGNOSIS — Z72.0 TOBACCO USE: ICD-10-CM

## 2019-03-11 DIAGNOSIS — L08.9 WOUND INFECTION: ICD-10-CM

## 2019-03-11 DIAGNOSIS — L97.922 SKIN ULCER OF LEFT LOWER LEG WITH FAT LAYER EXPOSED (HCC): Primary | ICD-10-CM

## 2019-03-11 DIAGNOSIS — I87.8 CHRONIC VENOUS STASIS: ICD-10-CM

## 2019-03-11 DIAGNOSIS — L97.921 SKIN ULCER OF LEFT LOWER LEG, LIMITED TO BREAKDOWN OF SKIN (HCC): ICD-10-CM

## 2019-03-11 DIAGNOSIS — T14.8XXA WOUND INFECTION: ICD-10-CM

## 2019-03-11 PROCEDURE — 11042 DBRDMT SUBQ TIS 1ST 20SQCM/<: CPT

## 2019-03-11 PROCEDURE — 11042 DBRDMT SUBQ TIS 1ST 20SQCM/<: CPT | Performed by: NURSE PRACTITIONER

## 2019-03-11 ASSESSMENT — ENCOUNTER SYMPTOMS
COUGH: 0
VOMITING: 0
FALLS: 0
NERVOUS/ANXIOUS: 0
EYES NEGATIVE: 1
DEPRESSION: 0
WEAKNESS: 0
WHEEZING: 0
PALPITATIONS: 0
BACK PAIN: 0
NAUSEA: 0
CLAUDICATION: 0
DIAPHORESIS: 0
FEVER: 0
CHILLS: 0
SHORTNESS OF BREATH: 0
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
## 2019-03-14 ENCOUNTER — NON-PROVIDER VISIT (OUTPATIENT)
Dept: WOUND CARE | Facility: MEDICAL CENTER | Age: 64
End: 2019-03-14
Attending: FAMILY MEDICINE
Payer: MEDICAID

## 2019-03-14 PROCEDURE — 97597 DBRDMT OPN WND 1ST 20 CM/<: CPT

## 2019-03-14 NOTE — PATIENT INSTRUCTIONS
Should you experience any significant changes in your wound(s) such as infection (redness, swelling, localized heat, increased pain, fever >101 F, chills) or have any questions regarding your home care instructions, please contact the wound center [phone]. If after hours, contact your primary care physician or go the hospital emergency room.  Keep dressing clean and dry and cover while bathing. Only change dressing if over saturated, soiled or its falling off.

## 2019-03-18 ENCOUNTER — OFFICE VISIT (OUTPATIENT)
Dept: WOUND CARE | Facility: MEDICAL CENTER | Age: 64
End: 2019-03-18
Attending: FAMILY MEDICINE
Payer: MEDICAID

## 2019-03-18 VITALS
DIASTOLIC BLOOD PRESSURE: 92 MMHG | RESPIRATION RATE: 20 BRPM | HEART RATE: 75 BPM | TEMPERATURE: 97.6 F | OXYGEN SATURATION: 96 % | SYSTOLIC BLOOD PRESSURE: 143 MMHG

## 2019-03-18 DIAGNOSIS — L97.922 SKIN ULCER OF LEFT LOWER LEG WITH FAT LAYER EXPOSED (HCC): Primary | ICD-10-CM

## 2019-03-18 DIAGNOSIS — L97.921 SKIN ULCER OF LEFT LOWER LEG, LIMITED TO BREAKDOWN OF SKIN (HCC): ICD-10-CM

## 2019-03-18 DIAGNOSIS — Z72.0 TOBACCO USE: ICD-10-CM

## 2019-03-18 DIAGNOSIS — L08.9 WOUND INFECTION: ICD-10-CM

## 2019-03-18 DIAGNOSIS — T14.8XXA WOUND INFECTION: ICD-10-CM

## 2019-03-18 DIAGNOSIS — I87.8 CHRONIC VENOUS STASIS: ICD-10-CM

## 2019-03-18 PROCEDURE — 11042 DBRDMT SUBQ TIS 1ST 20SQCM/<: CPT | Performed by: NURSE PRACTITIONER

## 2019-03-18 PROCEDURE — 11042 DBRDMT SUBQ TIS 1ST 20SQCM/<: CPT

## 2019-03-18 RX ORDER — GLECAPREVIR AND PIBRENTASVIR 40; 100 MG/1; MG/1
TABLET, FILM COATED ORAL
COMMUNITY
Start: 2019-03-07 | End: 2019-12-26

## 2019-03-18 ASSESSMENT — ENCOUNTER SYMPTOMS
FEVER: 0
SHORTNESS OF BREATH: 0
VOMITING: 0
BACK PAIN: 0
WHEEZING: 0
FALLS: 0
DIAPHORESIS: 0
CHILLS: 0
DEPRESSION: 0
COUGH: 0
PALPITATIONS: 0
WEAKNESS: 0
CLAUDICATION: 0
NAUSEA: 0
NERVOUS/ANXIOUS: 0
EYES NEGATIVE: 1

## 2019-03-18 ASSESSMENT — PAIN SCALES - GENERAL: PAINLEVEL: 3=SLIGHT PAIN

## 2019-03-18 NOTE — PATIENT INSTRUCTIONS
Avoid prolonged standing or sitting without elevating your legs.    - Multilayer compression wrap to left leg. Do not get wet and keep on for the week. Only remove if temperature or sensation changes.    Keep dressing clean and dry and cover while bathing. Only change dressing if over saturated, soiled or its falling off.     Should you experience any significant changes in your wound(s) such as infection (redness, swelling, localized heat, increased pain, fever >101 F, chills) or have any questions regarding your home care instructions, please contact the wound center [phone]. If after hours, contact your primary care physician or go the hospital emergency room.
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
## 2019-03-21 ENCOUNTER — NON-PROVIDER VISIT (OUTPATIENT)
Dept: WOUND CARE | Facility: MEDICAL CENTER | Age: 64
End: 2019-03-21
Attending: FAMILY MEDICINE
Payer: MEDICAID

## 2019-03-21 PROCEDURE — 97597 DBRDMT OPN WND 1ST 20 CM/<: CPT

## 2019-03-21 NOTE — PATIENT INSTRUCTIONS
Should you experience any significant changes in your wound(s) such as infection (redness, swelling, localized heat, increased pain, fever >101 F, chills) or have any questions regarding your home care instructions, please contact the wound center [phone]. If after hours, contact your primary care physician or go the hospital emergency room.  Keep dressing clean and dry and cover while bathing. Only change dressing if over saturated, soiled or its falling off.

## 2019-03-25 ENCOUNTER — NON-PROVIDER VISIT (OUTPATIENT)
Dept: WOUND CARE | Facility: MEDICAL CENTER | Age: 64
End: 2019-03-25
Attending: FAMILY MEDICINE
Payer: MEDICAID

## 2019-03-25 PROCEDURE — 97597 DBRDMT OPN WND 1ST 20 CM/<: CPT

## 2019-03-25 NOTE — PROCEDURES
CSWD with scalpel to left lower leg posterior wound to remove heavy biofilm of non-viable tissue from ~3cm2.

## 2019-03-28 ENCOUNTER — NON-PROVIDER VISIT (OUTPATIENT)
Dept: WOUND CARE | Facility: MEDICAL CENTER | Age: 64
End: 2019-03-28
Attending: FAMILY MEDICINE
Payer: MEDICAID

## 2019-03-28 PROCEDURE — 97602 WOUND(S) CARE NON-SELECTIVE: CPT

## 2019-03-28 NOTE — PATIENT INSTRUCTIONS
-Keep your wound dressing clean, dry, and intact.    -Remove your compression wrap if you have severe pain, severe swelling, numbness, color change, or temperature change in your toes. If you need to remove your compression wrap, do so by unrolling it. Do not cut the compression wrap off to prevent cutting yourself on accident.    -Should you experience any significant changes in your wound(s), such as infection (redness, swelling, localized heat, increased pain, fever > 101 F, chills) or have any questions regarding your home care instructions, please contact the wound center at [phone]. If after hours, contact your primary care physician or go to the hospital emergency room.

## 2019-04-01 ENCOUNTER — OFFICE VISIT (OUTPATIENT)
Dept: WOUND CARE | Facility: MEDICAL CENTER | Age: 64
End: 2019-04-01
Attending: FAMILY MEDICINE
Payer: MEDICAID

## 2019-04-01 VITALS
RESPIRATION RATE: 20 BRPM | TEMPERATURE: 98.9 F | HEART RATE: 90 BPM | DIASTOLIC BLOOD PRESSURE: 101 MMHG | OXYGEN SATURATION: 96 % | SYSTOLIC BLOOD PRESSURE: 150 MMHG

## 2019-04-01 DIAGNOSIS — I87.8 CHRONIC VENOUS STASIS: ICD-10-CM

## 2019-04-01 DIAGNOSIS — T14.8XXA WOUND INFECTION: ICD-10-CM

## 2019-04-01 DIAGNOSIS — L08.9 WOUND INFECTION: ICD-10-CM

## 2019-04-01 DIAGNOSIS — L97.921 SKIN ULCER OF LEFT LOWER LEG, LIMITED TO BREAKDOWN OF SKIN (HCC): Primary | ICD-10-CM

## 2019-04-01 DIAGNOSIS — Z72.0 TOBACCO USE: ICD-10-CM

## 2019-04-01 PROCEDURE — 11042 DBRDMT SUBQ TIS 1ST 20SQCM/<: CPT | Performed by: NURSE PRACTITIONER

## 2019-04-01 PROCEDURE — 11042 DBRDMT SUBQ TIS 1ST 20SQCM/<: CPT

## 2019-04-01 ASSESSMENT — ENCOUNTER SYMPTOMS
FALLS: 0
WEAKNESS: 0
PALPITATIONS: 0
CHILLS: 0
DIAPHORESIS: 0
COUGH: 0
BACK PAIN: 0
DEPRESSION: 0
VOMITING: 0
EYES NEGATIVE: 1
CLAUDICATION: 0
FEVER: 0
NAUSEA: 0
SHORTNESS OF BREATH: 0
NERVOUS/ANXIOUS: 0
WHEEZING: 0
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
## 2019-04-04 ENCOUNTER — NON-PROVIDER VISIT (OUTPATIENT)
Dept: WOUND CARE | Facility: MEDICAL CENTER | Age: 64
End: 2019-04-04
Attending: FAMILY MEDICINE
Payer: MEDICAID

## 2019-04-04 PROCEDURE — 97602 WOUND(S) CARE NON-SELECTIVE: CPT

## 2019-04-04 NOTE — PATIENT INSTRUCTIONS
Should you experience any significant changes in your wound(s) such as infection (redness, swelling, localized heat, increased pain, fever >101 F, chills) or have any questions regarding your home care instructions, please contact the wound center [phone]. If after hours, contact your primary care physician or go the hospital emergency room.  Keep dressing clean and dry and cover while bathing. Only change dressing if over saturated, soiled or its falling off.     Reduce appointments to 1 x week. Keep unna's boot dry.

## 2019-04-10 ENCOUNTER — HOSPITAL ENCOUNTER (OUTPATIENT)
Dept: LAB | Facility: MEDICAL CENTER | Age: 64
End: 2019-04-10
Attending: INTERNAL MEDICINE
Payer: MEDICAID

## 2019-04-10 ENCOUNTER — OFFICE VISIT (OUTPATIENT)
Dept: WOUND CARE | Facility: MEDICAL CENTER | Age: 64
End: 2019-04-10
Attending: FAMILY MEDICINE
Payer: MEDICAID

## 2019-04-10 VITALS
HEART RATE: 61 BPM | OXYGEN SATURATION: 99 % | RESPIRATION RATE: 18 BRPM | TEMPERATURE: 98.1 F | DIASTOLIC BLOOD PRESSURE: 114 MMHG | SYSTOLIC BLOOD PRESSURE: 173 MMHG

## 2019-04-10 DIAGNOSIS — L08.9 WOUND INFECTION: ICD-10-CM

## 2019-04-10 DIAGNOSIS — L97.921 SKIN ULCER OF LEFT LOWER LEG, LIMITED TO BREAKDOWN OF SKIN (HCC): ICD-10-CM

## 2019-04-10 DIAGNOSIS — T14.8XXA WOUND INFECTION: ICD-10-CM

## 2019-04-10 DIAGNOSIS — I87.8 CHRONIC VENOUS STASIS: ICD-10-CM

## 2019-04-10 DIAGNOSIS — Z72.0 TOBACCO USE: ICD-10-CM

## 2019-04-10 LAB
ANISOCYTOSIS BLD QL SMEAR: ABNORMAL
BASOPHILS # BLD AUTO: 0.9 % (ref 0–1.8)
BASOPHILS # BLD: 0.07 K/UL (ref 0–0.12)
BURR CELLS BLD QL SMEAR: NORMAL
COMMENT 1642: NORMAL
EOSINOPHIL # BLD AUTO: 0.26 K/UL (ref 0–0.51)
EOSINOPHIL NFR BLD: 3.3 % (ref 0–6.9)
ERYTHROCYTE [DISTWIDTH] IN BLOOD BY AUTOMATED COUNT: 61.2 FL (ref 35.9–50)
HCT VFR BLD AUTO: 47.6 % (ref 42–52)
HGB BLD-MCNC: 14 G/DL (ref 14–18)
IMM GRANULOCYTES # BLD AUTO: 0.03 K/UL (ref 0–0.11)
IMM GRANULOCYTES NFR BLD AUTO: 0.4 % (ref 0–0.9)
LYMPHOCYTES # BLD AUTO: 1.19 K/UL (ref 1–4.8)
LYMPHOCYTES NFR BLD: 15.3 % (ref 22–41)
MCH RBC QN AUTO: 31.4 PG (ref 27–33)
MCHC RBC AUTO-ENTMCNC: 29.4 G/DL (ref 33.7–35.3)
MCV RBC AUTO: 106.7 FL (ref 81.4–97.8)
MICROCYTES BLD QL SMEAR: ABNORMAL
MONOCYTES # BLD AUTO: 0.37 K/UL (ref 0–0.85)
MONOCYTES NFR BLD AUTO: 4.7 % (ref 0–13.4)
MORPHOLOGY BLD-IMP: NORMAL
NEUTROPHILS # BLD AUTO: 5.87 K/UL (ref 1.82–7.42)
NEUTROPHILS NFR BLD: 75.4 % (ref 44–72)
NRBC # BLD AUTO: 0 K/UL
NRBC BLD-RTO: 0 /100 WBC
OVALOCYTES BLD QL SMEAR: NORMAL
PLATELET # BLD AUTO: 225 K/UL (ref 164–446)
PLATELET BLD QL SMEAR: NORMAL
PMV BLD AUTO: 10.7 FL (ref 9–12.9)
POIKILOCYTOSIS BLD QL SMEAR: NORMAL
RBC # BLD AUTO: 4.46 M/UL (ref 4.7–6.1)
RBC BLD AUTO: PRESENT
WBC # BLD AUTO: 7.8 K/UL (ref 4.8–10.8)

## 2019-04-10 PROCEDURE — 11042 DBRDMT SUBQ TIS 1ST 20SQCM/<: CPT

## 2019-04-10 PROCEDURE — 87522 HEPATITIS C REVRS TRNSCRPJ: CPT

## 2019-04-10 PROCEDURE — 11042 DBRDMT SUBQ TIS 1ST 20SQCM/<: CPT | Performed by: NURSE PRACTITIONER

## 2019-04-10 PROCEDURE — 36415 COLL VENOUS BLD VENIPUNCTURE: CPT

## 2019-04-10 PROCEDURE — 85025 COMPLETE CBC W/AUTO DIFF WBC: CPT

## 2019-04-10 ASSESSMENT — ENCOUNTER SYMPTOMS
FALLS: 0
NERVOUS/ANXIOUS: 0
BACK PAIN: 0
SHORTNESS OF BREATH: 0
NAUSEA: 0
WHEEZING: 0
DIAPHORESIS: 0
CHILLS: 0
COUGH: 0
CLAUDICATION: 0
PALPITATIONS: 0
WEAKNESS: 0
DEPRESSION: 0
FEVER: 0
VOMITING: 0
EYES NEGATIVE: 1

## 2019-04-10 NOTE — PATIENT INSTRUCTIONS
-Keep dressings clean, dry and covered while bathing. Only change dressings if they become over saturated, soiled or fall off.     -Avoid prolonged standing or sitting without elevating your legs.    -Remove your compression garments if you have severe pain, severe swelling, numbness, color change, or temperature change in your toes. If you need to remove your compression garments, do so by unrolling them. Do not cut the compression garments off, this is to prevent cutting yourself on accident.    -Should you experience any significant changes in your wound(s), such as infection (redness, swelling, localized heat, increased pain, fever > 101 F, chills) or have any questions regarding your home care instructions, please contact the wound center at [phone]. If after hours, contact your primary care physician or go to the hospital emergency room.
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
## 2019-04-13 LAB
HCV RNA SERPL NAA+PROBE-ACNC: ABNORMAL IU/ML
HCV RNA SERPL NAA+PROBE-LOG IU: <1 LOG IU/ML
HCV RNA SERPL QL NAA+PROBE: DETECTED

## 2019-04-17 ENCOUNTER — NON-PROVIDER VISIT (OUTPATIENT)
Dept: WOUND CARE | Facility: MEDICAL CENTER | Age: 64
End: 2019-04-17
Attending: FAMILY MEDICINE
Payer: MEDICAID

## 2019-04-17 PROCEDURE — 97602 WOUND(S) CARE NON-SELECTIVE: CPT

## 2019-04-17 NOTE — PATIENT INSTRUCTIONS
Should you experience any significant changes in your wound(s) such as infection (redness, swelling, localized heat, increased pain, fever >101 F, chills) or have any questions regarding your home care instructions, please contact the wound center [phone]. If after hours, contact your primary care physician or go the hospital emergency room.  Keep dressing clean and dry and cover while bathing. Only change dressing if over saturated, soiled or its falling off.

## 2019-04-18 DIAGNOSIS — F41.9 ANXIETY: ICD-10-CM

## 2019-04-23 RX ORDER — CITALOPRAM 20 MG/1
TABLET ORAL
Qty: 30 TAB | Refills: 5 | Status: SHIPPED
Start: 2019-04-23 | End: 2019-12-26

## 2019-04-24 ENCOUNTER — OFFICE VISIT (OUTPATIENT)
Dept: WOUND CARE | Facility: MEDICAL CENTER | Age: 64
End: 2019-04-24
Attending: FAMILY MEDICINE
Payer: MEDICAID

## 2019-04-24 VITALS
HEART RATE: 70 BPM | OXYGEN SATURATION: 97 % | SYSTOLIC BLOOD PRESSURE: 134 MMHG | RESPIRATION RATE: 20 BRPM | DIASTOLIC BLOOD PRESSURE: 97 MMHG | TEMPERATURE: 98 F

## 2019-04-24 DIAGNOSIS — Z72.0 TOBACCO USE: ICD-10-CM

## 2019-04-24 DIAGNOSIS — T14.8XXA WOUND INFECTION: ICD-10-CM

## 2019-04-24 DIAGNOSIS — L97.921 SKIN ULCER OF LEFT LOWER LEG, LIMITED TO BREAKDOWN OF SKIN (HCC): ICD-10-CM

## 2019-04-24 DIAGNOSIS — L08.9 WOUND INFECTION: ICD-10-CM

## 2019-04-24 DIAGNOSIS — I87.8 CHRONIC VENOUS STASIS: ICD-10-CM

## 2019-04-24 PROCEDURE — 99212 OFFICE O/P EST SF 10 MIN: CPT | Performed by: NURSE PRACTITIONER

## 2019-04-24 PROCEDURE — 99214 OFFICE O/P EST MOD 30 MIN: CPT

## 2019-04-24 ASSESSMENT — ENCOUNTER SYMPTOMS
FEVER: 0
NERVOUS/ANXIOUS: 0
NAUSEA: 0
BACK PAIN: 0
DEPRESSION: 0
DIAPHORESIS: 0
EYES NEGATIVE: 1
WEAKNESS: 0
FALLS: 0
VOMITING: 0
SHORTNESS OF BREATH: 0
COUGH: 0
CLAUDICATION: 0
PALPITATIONS: 0
CHILLS: 0
WHEEZING: 0
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
## 2019-04-24 NOTE — PATIENT INSTRUCTIONS
Discussed POC, wound care rationale, dressing selection and to return to [facility] in one week for likely final appt. Pt instructed to keep dressing CDI and to return to ER for any s/s infection, n/v, fever or chills. Pt verbalized understanding to all.

## 2019-05-01 ENCOUNTER — OFFICE VISIT (OUTPATIENT)
Dept: WOUND CARE | Facility: MEDICAL CENTER | Age: 64
End: 2019-05-01
Attending: FAMILY MEDICINE
Payer: MEDICAID

## 2019-05-01 VITALS
DIASTOLIC BLOOD PRESSURE: 88 MMHG | TEMPERATURE: 99.1 F | SYSTOLIC BLOOD PRESSURE: 138 MMHG | RESPIRATION RATE: 18 BRPM | HEART RATE: 81 BPM | OXYGEN SATURATION: 95 %

## 2019-05-01 DIAGNOSIS — Z72.0 TOBACCO USE: ICD-10-CM

## 2019-05-01 DIAGNOSIS — L97.921 SKIN ULCER OF LEFT LOWER LEG, LIMITED TO BREAKDOWN OF SKIN (HCC): ICD-10-CM

## 2019-05-01 DIAGNOSIS — T14.8XXA WOUND INFECTION: ICD-10-CM

## 2019-05-01 DIAGNOSIS — I87.8 CHRONIC VENOUS STASIS: ICD-10-CM

## 2019-05-01 DIAGNOSIS — L08.9 WOUND INFECTION: ICD-10-CM

## 2019-05-01 PROCEDURE — 11042 DBRDMT SUBQ TIS 1ST 20SQCM/<: CPT

## 2019-05-01 PROCEDURE — 11042 DBRDMT SUBQ TIS 1ST 20SQCM/<: CPT | Performed by: NURSE PRACTITIONER

## 2019-05-01 ASSESSMENT — ENCOUNTER SYMPTOMS
SHORTNESS OF BREATH: 0
VOMITING: 0
COUGH: 0
WEAKNESS: 0
WHEEZING: 0
NERVOUS/ANXIOUS: 0
DIAPHORESIS: 0
BACK PAIN: 0
FALLS: 0
EYES NEGATIVE: 1
DEPRESSION: 0
NAUSEA: 0
CHILLS: 0
PALPITATIONS: 0
CLAUDICATION: 0
FEVER: 0

## 2019-05-01 ASSESSMENT — PAIN SCALES - GENERAL: PAINLEVEL: 6=MODERATE PAIN

## 2019-05-02 NOTE — PATIENT INSTRUCTIONS
Should you experience any significant changes in your wound(s) such as infection (redness, swelling, localized heat, increased pain, fever >101 F, chills) or have any questions regarding your home care instructions, please contact the wound center [phone]. If after hours, contact your primary care physician or go the hospital emergency room.  Keep dressing clean and dry and cover while bathing. Keep unna boot on for a week until next appt, keep it dry. Only change dressing if over saturated, soiled or its falling off.
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
## 2019-05-08 ENCOUNTER — HOSPITAL ENCOUNTER (OUTPATIENT)
Dept: LAB | Facility: MEDICAL CENTER | Age: 64
End: 2019-05-08
Attending: INTERNAL MEDICINE
Payer: MEDICAID

## 2019-05-08 ENCOUNTER — OFFICE VISIT (OUTPATIENT)
Dept: WOUND CARE | Facility: MEDICAL CENTER | Age: 64
End: 2019-05-08
Attending: FAMILY MEDICINE
Payer: MEDICAID

## 2019-05-08 VITALS
SYSTOLIC BLOOD PRESSURE: 130 MMHG | TEMPERATURE: 97.8 F | HEART RATE: 49 BPM | DIASTOLIC BLOOD PRESSURE: 90 MMHG | RESPIRATION RATE: 12 BRPM | OXYGEN SATURATION: 98 %

## 2019-05-08 DIAGNOSIS — T14.8XXA WOUND INFECTION: ICD-10-CM

## 2019-05-08 DIAGNOSIS — L08.9 WOUND INFECTION: ICD-10-CM

## 2019-05-08 DIAGNOSIS — Z72.0 TOBACCO USE: ICD-10-CM

## 2019-05-08 DIAGNOSIS — L97.921 SKIN ULCER OF LEFT LOWER LEG, LIMITED TO BREAKDOWN OF SKIN (HCC): ICD-10-CM

## 2019-05-08 DIAGNOSIS — I87.8 CHRONIC VENOUS STASIS: ICD-10-CM

## 2019-05-08 LAB
ALBUMIN SERPL BCP-MCNC: 4 G/DL (ref 3.2–4.9)
ALBUMIN/GLOB SERPL: 1.1 G/DL
ALP SERPL-CCNC: 69 U/L (ref 30–99)
ALT SERPL-CCNC: 13 U/L (ref 2–50)
ANION GAP SERPL CALC-SCNC: 7 MMOL/L (ref 0–11.9)
AST SERPL-CCNC: 16 U/L (ref 12–45)
BASOPHILS # BLD AUTO: 1.3 % (ref 0–1.8)
BASOPHILS # BLD: 0.07 K/UL (ref 0–0.12)
BILIRUB SERPL-MCNC: 0.5 MG/DL (ref 0.1–1.5)
BUN SERPL-MCNC: 21 MG/DL (ref 8–22)
CALCIUM SERPL-MCNC: 8.8 MG/DL (ref 8.5–10.5)
CHLORIDE SERPL-SCNC: 109 MMOL/L (ref 96–112)
CO2 SERPL-SCNC: 28 MMOL/L (ref 20–33)
CREAT SERPL-MCNC: 0.95 MG/DL (ref 0.5–1.4)
EOSINOPHIL # BLD AUTO: 0.27 K/UL (ref 0–0.51)
EOSINOPHIL NFR BLD: 4.9 % (ref 0–6.9)
ERYTHROCYTE [DISTWIDTH] IN BLOOD BY AUTOMATED COUNT: 54.8 FL (ref 35.9–50)
GLOBULIN SER CALC-MCNC: 3.5 G/DL (ref 1.9–3.5)
GLUCOSE SERPL-MCNC: 89 MG/DL (ref 65–99)
HCT VFR BLD AUTO: 44.2 % (ref 42–52)
HGB BLD-MCNC: 13.6 G/DL (ref 14–18)
IMM GRANULOCYTES # BLD AUTO: 0.02 K/UL (ref 0–0.11)
IMM GRANULOCYTES NFR BLD AUTO: 0.4 % (ref 0–0.9)
LYMPHOCYTES # BLD AUTO: 1.25 K/UL (ref 1–4.8)
LYMPHOCYTES NFR BLD: 22.8 % (ref 22–41)
MCH RBC QN AUTO: 30.8 PG (ref 27–33)
MCHC RBC AUTO-ENTMCNC: 30.8 G/DL (ref 33.7–35.3)
MCV RBC AUTO: 100 FL (ref 81.4–97.8)
MONOCYTES # BLD AUTO: 0.31 K/UL (ref 0–0.85)
MONOCYTES NFR BLD AUTO: 5.7 % (ref 0–13.4)
NEUTROPHILS # BLD AUTO: 3.56 K/UL (ref 1.82–7.42)
NEUTROPHILS NFR BLD: 64.9 % (ref 44–72)
NRBC # BLD AUTO: 0 K/UL
NRBC BLD-RTO: 0 /100 WBC
PLATELET # BLD AUTO: 280 K/UL (ref 164–446)
PMV BLD AUTO: 9.1 FL (ref 9–12.9)
POTASSIUM SERPL-SCNC: 4.7 MMOL/L (ref 3.6–5.5)
PROT SERPL-MCNC: 7.5 G/DL (ref 6–8.2)
RBC # BLD AUTO: 4.42 M/UL (ref 4.7–6.1)
SODIUM SERPL-SCNC: 144 MMOL/L (ref 135–145)
WBC # BLD AUTO: 5.5 K/UL (ref 4.8–10.8)

## 2019-05-08 PROCEDURE — 36415 COLL VENOUS BLD VENIPUNCTURE: CPT

## 2019-05-08 PROCEDURE — 87522 HEPATITIS C REVRS TRNSCRPJ: CPT

## 2019-05-08 PROCEDURE — 99212 OFFICE O/P EST SF 10 MIN: CPT | Performed by: NURSE PRACTITIONER

## 2019-05-08 PROCEDURE — 85025 COMPLETE CBC W/AUTO DIFF WBC: CPT

## 2019-05-08 PROCEDURE — 29580 STRAPPING UNNA BOOT: CPT

## 2019-05-08 PROCEDURE — 80053 COMPREHEN METABOLIC PANEL: CPT

## 2019-05-08 ASSESSMENT — ENCOUNTER SYMPTOMS
WHEEZING: 0
NAUSEA: 0
PALPITATIONS: 0
NERVOUS/ANXIOUS: 0
FALLS: 0
CLAUDICATION: 0
WEAKNESS: 0
VOMITING: 0
SHORTNESS OF BREATH: 0
DIAPHORESIS: 0
CHILLS: 0
FEVER: 0
COUGH: 0
EYES NEGATIVE: 1
BACK PAIN: 0

## 2019-05-08 ASSESSMENT — PAIN SCALES - GENERAL: PAINLEVEL: NO PAIN
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
## 2019-05-08 NOTE — NON-PROVIDER
Ordered compression stockings from [employer].      Wound Left Posterior LE distally (Active)   Wound Image   5/8/2019  1:15 PM   Site Assessment Red 5/8/2019  1:15 PM   Marylou-wound Assessment Fragile 5/8/2019  1:15 PM   Margins Attached edges 5/8/2019  1:15 PM   Wound Length (cm) 5 cm 5/8/2019  1:15 PM   Wound Width (cm) 1.5 cm 5/8/2019  1:15 PM   Wound Depth (cm) 0.1 cm 5/8/2019  1:15 PM   Wound Surface Area (cm^2) 7.5 cm^2 5/8/2019  1:15 PM   Post Wound Length (cm) 4 cm 5/1/2019  5:00 PM    Post Wound Width (cm) 1.3 cm 5/1/2019  5:00 PM   Post Wound Depth (cm) 0.1 cm 5/1/2019  5:00 PM   Post Wound Surface Area (cm^2) 5.2 cm^2 5/1/2019  5:00 PM   Tunneling 0 cm 5/8/2019  1:15 PM   Undermining 0 cm 5/8/2019  1:15 PM   Closure Secondary intention 5/8/2019  1:15 PM   Drainage Amount Small 5/8/2019  1:15 PM   Drainage Description Serosanguineous 5/8/2019  1:15 PM   Non-staged Wound Description Partial thickness 5/8/2019  1:15 PM   Treatments Cleansed 5/8/2019  1:15 PM   Cleansing Approved Wound Cleanser 5/8/2019  1:15 PM   Periwound Protectant Barrier Paste 5/8/2019  1:15 PM   Dressing Options Unna Boot 5/8/2019  1:15 PM   Dressing Cleansing/Solutions Not Applicable 5/8/2019  1:15 PM   Dressing Changed Changed 5/8/2019  1:15 PM   Dressing Status Clean;Dry;Intact 5/8/2019  1:15 PM   Dressing Change Frequency Weekly 5/8/2019  1:15 PM   NEXT Dressing Change  05/15/19 5/8/2019  1:15 PM   WOUND NURSE ONLY - Odor None 5/8/2019  1:15 PM   WOUND NURSE ONLY - Pulses Left;2+;DP;PT 5/8/2019  1:15 PM   WOUND NURSE ONLY - Exposed Structures None 5/8/2019  1:15 PM   WOUND NURSE ONLY - Tissue Type and Percentage 100% moist red 5/8/2019  1:15 PM       Wound 05/01/19 Venous Ulcer Leg L posterior LE proximally (Active)   Wound Image   5/8/2019  1:15 PM   Site Assessment Red 5/8/2019  1:15 PM   Marylou-wound Assessment Clean;Dry;Intact;Hyperpigmented 5/8/2019  1:15 PM   Margins Attached edges;Defined edges 5/8/2019  1:15 PM   Wound Length (cm)  0.2 cm 5/8/2019  1:15 PM   Wound Width (cm) 0.1 cm 5/8/2019  1:15 PM   Wound Depth (cm) 0.1 cm 5/8/2019  1:15 PM   Wound Surface Area (cm^2) 0.02 cm^2 5/8/2019  1:15 PM   Tunneling 0 cm 5/8/2019  1:15 PM   Undermining 0 cm 5/8/2019  1:15 PM   Closure Secondary intention 5/8/2019  1:15 PM   Drainage Amount Small 5/8/2019  1:15 PM   Drainage Description Serosanguineous 5/8/2019  1:15 PM   Non-staged Wound Description Full thickness 5/8/2019  1:15 PM   Treatments Cleansed;Site care 5/8/2019  1:15 PM   Cleansing Normal Saline Irrigation 5/8/2019  1:15 PM   Periwound Protectant Barrier Paste 5/8/2019  1:15 PM   Dressing Options Unna Boot 5/8/2019  1:15 PM   Dressing Cleansing/Solutions Normal Saline 5/8/2019  1:15 PM   Dressing Changed Changed 5/8/2019  1:15 PM   Dressing Status Clean;Dry;Intact 5/8/2019  1:15 PM   Dressing Change Frequency Weekly 5/8/2019  1:15 PM   NEXT Dressing Change  05/15/19 5/8/2019  1:15 PM   WOUND NURSE ONLY - Odor None 5/8/2019  1:15 PM   WOUND NURSE ONLY - Exposed Structures None 5/8/2019  1:15 PM   WOUND NURSE ONLY - Tissue Type and Percentage 100% moist red 5/8/2019  1:15 PM

## 2019-05-08 NOTE — PATIENT INSTRUCTIONS
Avoid prolonged standing or sitting without elevating your legs.    - Multilayer compression wrap to left leg. Do not get wet and keep on for the week. Only remove if temperature or sensation changes.   If compression needs to be removed, un-wrap it do not cut it off.     Should you experience any significant changes in your wound(s), such as infection (redness, swelling, localized heat, increased pain, fever > 101 F, chills) or have any questions regarding your home care instructions, please contact the wound center at [phone]. If after hours, contact your primary care physician or go to the hospital emergency room.   Keep dressing clean, dry and covered while bathing. Only change dressing if it becomes over saturated, soiled or falls off.

## 2019-05-11 LAB
HCV RNA SERPL NAA+PROBE-ACNC: NOT DETECTED IU/ML
HCV RNA SERPL NAA+PROBE-LOG IU: NOT DETECTED LOG IU/ML
HCV RNA SERPL QL NAA+PROBE: NOT DETECTED

## 2019-05-15 ENCOUNTER — OFFICE VISIT (OUTPATIENT)
Dept: WOUND CARE | Facility: MEDICAL CENTER | Age: 64
End: 2019-05-15
Attending: FAMILY MEDICINE
Payer: MEDICAID

## 2019-05-15 VITALS
OXYGEN SATURATION: 98 % | RESPIRATION RATE: 16 BRPM | TEMPERATURE: 98 F | HEART RATE: 54 BPM | SYSTOLIC BLOOD PRESSURE: 137 MMHG | DIASTOLIC BLOOD PRESSURE: 80 MMHG

## 2019-05-15 DIAGNOSIS — I87.8 CHRONIC VENOUS STASIS: ICD-10-CM

## 2019-05-15 DIAGNOSIS — L97.921 SKIN ULCER OF LEFT LOWER LEG, LIMITED TO BREAKDOWN OF SKIN (HCC): ICD-10-CM

## 2019-05-15 DIAGNOSIS — L08.9 WOUND INFECTION: ICD-10-CM

## 2019-05-15 DIAGNOSIS — Z72.0 TOBACCO USE: ICD-10-CM

## 2019-05-15 DIAGNOSIS — T14.8XXA WOUND INFECTION: ICD-10-CM

## 2019-05-15 PROCEDURE — 99214 OFFICE O/P EST MOD 30 MIN: CPT

## 2019-05-15 PROCEDURE — 99212 OFFICE O/P EST SF 10 MIN: CPT | Performed by: NURSE PRACTITIONER

## 2019-05-15 PROCEDURE — 29580 STRAPPING UNNA BOOT: CPT | Mod: LT

## 2019-05-15 ASSESSMENT — ENCOUNTER SYMPTOMS
NAUSEA: 0
NERVOUS/ANXIOUS: 0
CHILLS: 0
PALPITATIONS: 0
WHEEZING: 0
BACK PAIN: 0
FEVER: 0
COUGH: 0
FALLS: 0
WEAKNESS: 0
EYES NEGATIVE: 1
VOMITING: 0
CLAUDICATION: 0
SHORTNESS OF BREATH: 0
DIAPHORESIS: 0

## 2019-05-15 NOTE — PATIENT INSTRUCTIONS
-Keep your wound dressing clean, dry, and intact.    -Change your dressing if it becomes soiled, soaked, or falls off.    -Remove your compression wrap if you have severe pain, severe swelling, numbness, color change, or temperature change in your toes. If you need to remove your compression wrap, do so by unrolling it. Do not cut the compression wrap off to prevent cutting yourself on accident.    -Should you experience any significant changes in your wound(s), such as infection (redness, swelling, localized heat, increased pain, fever > 101 F, chills) or have any questions regarding your home care instructions, please contact the wound center at [phone]. If after hours, contact your primary care physician or go to the hospital emergency room.
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
## 2019-05-22 ENCOUNTER — OFFICE VISIT (OUTPATIENT)
Dept: WOUND CARE | Facility: MEDICAL CENTER | Age: 64
End: 2019-05-22
Attending: FAMILY MEDICINE
Payer: MEDICAID

## 2019-05-22 ENCOUNTER — HOSPITAL ENCOUNTER (OUTPATIENT)
Facility: MEDICAL CENTER | Age: 64
End: 2019-05-22
Attending: NURSE PRACTITIONER
Payer: MEDICAID

## 2019-05-22 VITALS
DIASTOLIC BLOOD PRESSURE: 82 MMHG | HEART RATE: 51 BPM | RESPIRATION RATE: 18 BRPM | OXYGEN SATURATION: 97 % | SYSTOLIC BLOOD PRESSURE: 127 MMHG | TEMPERATURE: 98 F

## 2019-05-22 DIAGNOSIS — T14.8XXA WOUND INFECTION: ICD-10-CM

## 2019-05-22 DIAGNOSIS — Z72.0 TOBACCO USE: ICD-10-CM

## 2019-05-22 DIAGNOSIS — L08.9 WOUND INFECTION: ICD-10-CM

## 2019-05-22 DIAGNOSIS — I87.8 CHRONIC VENOUS STASIS: ICD-10-CM

## 2019-05-22 DIAGNOSIS — L97.922 SKIN ULCER OF LEFT LOWER LEG WITH FAT LAYER EXPOSED (HCC): ICD-10-CM

## 2019-05-22 PROCEDURE — 87205 SMEAR GRAM STAIN: CPT

## 2019-05-22 PROCEDURE — 87070 CULTURE OTHR SPECIMN AEROBIC: CPT

## 2019-05-22 PROCEDURE — 87077 CULTURE AEROBIC IDENTIFY: CPT

## 2019-05-22 PROCEDURE — 99213 OFFICE O/P EST LOW 20 MIN: CPT | Performed by: NURSE PRACTITIONER

## 2019-05-22 PROCEDURE — 99214 OFFICE O/P EST MOD 30 MIN: CPT

## 2019-05-22 PROCEDURE — 87186 SC STD MICRODIL/AGAR DIL: CPT | Mod: 91

## 2019-05-22 PROCEDURE — 29580 STRAPPING UNNA BOOT: CPT | Mod: LT

## 2019-05-22 ASSESSMENT — ENCOUNTER SYMPTOMS
WEAKNESS: 0
FALLS: 0
WHEEZING: 0
PALPITATIONS: 0
FEVER: 0
CHILLS: 0
NERVOUS/ANXIOUS: 0
EYES NEGATIVE: 1
COUGH: 0
NAUSEA: 0
DIAPHORESIS: 0
SHORTNESS OF BREATH: 0
VOMITING: 0
BACK PAIN: 0
CLAUDICATION: 0

## 2019-05-22 NOTE — PATIENT INSTRUCTIONS
-Keep your wound dressing clean, dry, and intact.    -Change your dressing if it becomes soiled, soaked, or falls off.    -Remove your compression wrap if you have severe pain, severe swelling, numbness, color change, or temperature change in your toes.     -Wear Farrow wrap as instructed.  Ok to take one or both layers off at night, when in bed.  Reapply immediately in the morning.    -Should you experience any significant changes in your wound(s), such as infection (redness, swelling, localized heat, increased pain, fever > 101 F, chills) or have any questions regarding your home care instructions, please contact the wound center at [phone]. If after hours, contact your primary care physician or go to the hospital emergency room.

## 2019-05-23 DIAGNOSIS — L08.9 WOUND INFECTION: ICD-10-CM

## 2019-05-23 DIAGNOSIS — T14.8XXA WOUND INFECTION: ICD-10-CM

## 2019-05-23 LAB
GRAM STN SPEC: NORMAL
SIGNIFICANT IND 70042: NORMAL
SITE SITE: NORMAL
SOURCE SOURCE: NORMAL
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
## 2019-05-24 DIAGNOSIS — I70.202 ATHEROSCLEROSIS OF NATIVE ARTERY OF LEFT LOWER EXTREMITY, WITH UNSPECIFIED PRESENCE OF CLINICAL MANIFESTATION (HCC): ICD-10-CM

## 2019-05-24 RX ORDER — ATORVASTATIN CALCIUM 40 MG/1
40 TABLET, FILM COATED ORAL
Qty: 90 TAB | Refills: 3 | Status: SHIPPED
Start: 2019-05-24 | End: 2019-12-26

## 2019-05-29 ENCOUNTER — OFFICE VISIT (OUTPATIENT)
Dept: WOUND CARE | Facility: MEDICAL CENTER | Age: 64
End: 2019-05-29
Attending: FAMILY MEDICINE
Payer: MEDICAID

## 2019-05-29 VITALS
OXYGEN SATURATION: 95 % | RESPIRATION RATE: 18 BRPM | HEART RATE: 95 BPM | SYSTOLIC BLOOD PRESSURE: 153 MMHG | TEMPERATURE: 97.7 F | DIASTOLIC BLOOD PRESSURE: 103 MMHG

## 2019-05-29 DIAGNOSIS — L97.922 SKIN ULCER OF LEFT LOWER LEG WITH FAT LAYER EXPOSED (HCC): ICD-10-CM

## 2019-05-29 DIAGNOSIS — I87.8 CHRONIC VENOUS STASIS: ICD-10-CM

## 2019-05-29 DIAGNOSIS — Z72.0 TOBACCO USE: ICD-10-CM

## 2019-05-29 DIAGNOSIS — T14.8XXA WOUND INFECTION: ICD-10-CM

## 2019-05-29 DIAGNOSIS — L08.9 WOUND INFECTION: ICD-10-CM

## 2019-05-29 PROCEDURE — 11042 DBRDMT SUBQ TIS 1ST 20SQCM/<: CPT | Performed by: NURSE PRACTITIONER

## 2019-05-29 PROCEDURE — 11042 DBRDMT SUBQ TIS 1ST 20SQCM/<: CPT

## 2019-05-29 RX ORDER — CIPROFLOXACIN 500 MG/1
500 TABLET, FILM COATED ORAL 2 TIMES DAILY
Qty: 20 TAB | Refills: 0 | Status: SHIPPED | OUTPATIENT
Start: 2019-05-29 | End: 2019-06-08

## 2019-05-29 RX ORDER — PENICILLIN V POTASSIUM 500 MG/1
500 TABLET ORAL 4 TIMES DAILY
Qty: 40 TAB | Refills: 0 | Status: SHIPPED | OUTPATIENT
Start: 2019-05-29 | End: 2019-06-08

## 2019-05-29 ASSESSMENT — ENCOUNTER SYMPTOMS
DIAPHORESIS: 0
PALPITATIONS: 0
CHILLS: 0
BACK PAIN: 0
WHEEZING: 0
VOMITING: 0
FALLS: 0
NERVOUS/ANXIOUS: 0
CLAUDICATION: 0
FEVER: 0
WEAKNESS: 0
EYES NEGATIVE: 1
COUGH: 0
NAUSEA: 0
SHORTNESS OF BREATH: 0

## 2019-05-29 NOTE — PATIENT INSTRUCTIONS
-Keep dressings clean, dry and covered while bathing. Only change dressings if they become over saturated, soiled or fall off.     -Avoid prolonged standing or sitting without elevating your legs.    -Remove your compression garments if you have severe pain, severe swelling, numbness, color change, or temperature change in your toes. If you need to remove your compression garments, do so by unrolling them. Do not cut the compression garments off, this is to prevent cutting yourself on accident.    -Should you experience any significant changes in your wound(s), such as infection (redness, swelling, localized heat, increased pain, fever > 101 F, chills) or have any questions regarding your home care instructions, please contact the wound center at [phone]. If after hours, contact your primary care physician or go to the hospital emergency room.
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
## 2019-06-05 ENCOUNTER — OFFICE VISIT (OUTPATIENT)
Dept: WOUND CARE | Facility: MEDICAL CENTER | Age: 64
End: 2019-06-05
Attending: FAMILY MEDICINE
Payer: MEDICAID

## 2019-06-05 VITALS
HEART RATE: 88 BPM | OXYGEN SATURATION: 97 % | RESPIRATION RATE: 18 BRPM | SYSTOLIC BLOOD PRESSURE: 142 MMHG | TEMPERATURE: 98.3 F | DIASTOLIC BLOOD PRESSURE: 87 MMHG

## 2019-06-05 DIAGNOSIS — L08.9 WOUND INFECTION: ICD-10-CM

## 2019-06-05 DIAGNOSIS — Z72.0 TOBACCO USE: ICD-10-CM

## 2019-06-05 DIAGNOSIS — L97.922 SKIN ULCER OF LEFT LOWER LEG WITH FAT LAYER EXPOSED (HCC): ICD-10-CM

## 2019-06-05 DIAGNOSIS — I87.8 CHRONIC VENOUS STASIS: ICD-10-CM

## 2019-06-05 DIAGNOSIS — T14.8XXA WOUND INFECTION: ICD-10-CM

## 2019-06-05 PROCEDURE — 11042 DBRDMT SUBQ TIS 1ST 20SQCM/<: CPT | Performed by: NURSE PRACTITIONER

## 2019-06-05 PROCEDURE — 11042 DBRDMT SUBQ TIS 1ST 20SQCM/<: CPT

## 2019-06-05 ASSESSMENT — ENCOUNTER SYMPTOMS
DIAPHORESIS: 0
CLAUDICATION: 0
VOMITING: 0
COUGH: 0
WHEEZING: 0
EYES NEGATIVE: 1
NAUSEA: 0
NERVOUS/ANXIOUS: 0
SHORTNESS OF BREATH: 0
FALLS: 0
BACK PAIN: 0
CHILLS: 0
FEVER: 0
WEAKNESS: 0
PALPITATIONS: 0
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
## 2019-06-10 ENCOUNTER — OFFICE VISIT (OUTPATIENT)
Dept: WOUND CARE | Facility: MEDICAL CENTER | Age: 64
End: 2019-06-10
Attending: FAMILY MEDICINE
Payer: MEDICAID

## 2019-06-10 VITALS
OXYGEN SATURATION: 94 % | HEART RATE: 84 BPM | DIASTOLIC BLOOD PRESSURE: 75 MMHG | RESPIRATION RATE: 18 BRPM | SYSTOLIC BLOOD PRESSURE: 120 MMHG | TEMPERATURE: 97.9 F

## 2019-06-10 DIAGNOSIS — Z72.0 TOBACCO USE: ICD-10-CM

## 2019-06-10 DIAGNOSIS — T14.8XXA WOUND INFECTION: ICD-10-CM

## 2019-06-10 DIAGNOSIS — L08.9 WOUND INFECTION: ICD-10-CM

## 2019-06-10 DIAGNOSIS — I87.8 CHRONIC VENOUS STASIS: ICD-10-CM

## 2019-06-10 DIAGNOSIS — L97.922 SKIN ULCER OF LEFT LOWER LEG WITH FAT LAYER EXPOSED (HCC): ICD-10-CM

## 2019-06-10 PROCEDURE — 15110 EPIDRM AGRFT T/A/L 1ST 100: CPT | Performed by: NURSE PRACTITIONER

## 2019-06-10 PROCEDURE — 15110 EPIDRM AGRFT T/A/L 1ST 100: CPT

## 2019-06-10 ASSESSMENT — ENCOUNTER SYMPTOMS
NERVOUS/ANXIOUS: 0
EYES NEGATIVE: 1
CLAUDICATION: 0
CHILLS: 0
NAUSEA: 0
WEAKNESS: 0
BACK PAIN: 0
FALLS: 0
VOMITING: 0
WHEEZING: 0
FEVER: 0
DIAPHORESIS: 0
SHORTNESS OF BREATH: 0
PALPITATIONS: 0
COUGH: 0
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
## 2019-06-10 NOTE — PATIENT INSTRUCTIONS
Keep dressing clean and dry and cover while bathing. Only change dressing if over saturated, soiled or its falling off.     Should you experience any significant changes in your wound(s) such as infection (redness, swelling, localized heat, increased pain, fever >101 F, chills) or have any questions regarding your home care instructions, please contact the wound center [phone]. If after hours, contact your primary care physician or go the hospital emergency room.

## 2019-06-19 ENCOUNTER — OFFICE VISIT (OUTPATIENT)
Dept: WOUND CARE | Facility: MEDICAL CENTER | Age: 64
End: 2019-06-19
Attending: FAMILY MEDICINE
Payer: MEDICAID

## 2019-06-19 VITALS
OXYGEN SATURATION: 95 % | SYSTOLIC BLOOD PRESSURE: 131 MMHG | HEART RATE: 85 BPM | TEMPERATURE: 98.8 F | DIASTOLIC BLOOD PRESSURE: 78 MMHG | RESPIRATION RATE: 16 BRPM

## 2019-06-19 DIAGNOSIS — L08.9 WOUND INFECTION: ICD-10-CM

## 2019-06-19 DIAGNOSIS — M21.962 ACQUIRED DEFORMITY OF LEFT ANKLE AND FOOT: ICD-10-CM

## 2019-06-19 DIAGNOSIS — Z72.0 TOBACCO USE: ICD-10-CM

## 2019-06-19 DIAGNOSIS — T14.8XXA WOUND INFECTION: ICD-10-CM

## 2019-06-19 DIAGNOSIS — L97.922 SKIN ULCER OF LEFT LOWER LEG WITH FAT LAYER EXPOSED (HCC): ICD-10-CM

## 2019-06-19 DIAGNOSIS — I87.8 CHRONIC VENOUS STASIS: ICD-10-CM

## 2019-06-19 DIAGNOSIS — M25.572 PAIN OF JOINT OF LEFT ANKLE AND FOOT: ICD-10-CM

## 2019-06-19 PROCEDURE — 29581 APPL MULTLAYER CMPRN SYS LEG: CPT

## 2019-06-19 PROCEDURE — 99212 OFFICE O/P EST SF 10 MIN: CPT | Performed by: NURSE PRACTITIONER

## 2019-06-19 ASSESSMENT — ENCOUNTER SYMPTOMS
BACK PAIN: 0
FEVER: 0
PALPITATIONS: 0
CLAUDICATION: 0
VOMITING: 0
DIAPHORESIS: 0
NAUSEA: 0
EYES NEGATIVE: 1
FALLS: 0
WHEEZING: 0
NERVOUS/ANXIOUS: 0
COUGH: 0
SHORTNESS OF BREATH: 0
WEAKNESS: 0
CHILLS: 0

## 2019-06-19 NOTE — PATIENT INSTRUCTIONS
-Keep dressings clean, dry and covered while bathing. Only change dressings if they become over saturated, soiled or fall off.     -Avoid prolonged standing or sitting without elevating your legs.    -Remove your compression garments if you have severe pain, severe swelling, numbness, color change, or temperature change in your toes. If you need to remove your compression garments, do so by unrolling them. Do not cut the compression garments off, this is to prevent cutting yourself on accident.    -Should you experience any significant changes in your wound(s), such as infection (redness, swelling, localized heat, increased pain, fever > 101 F, chills) or have any questions regarding your home care instructions, please contact the wound center at [phone]. If after hours, contact your primary care physician or go to the hospital emergency room.
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
## 2019-06-26 ENCOUNTER — OFFICE VISIT (OUTPATIENT)
Dept: WOUND CARE | Facility: MEDICAL CENTER | Age: 64
End: 2019-06-26
Attending: FAMILY MEDICINE
Payer: MEDICAID

## 2019-06-26 VITALS
OXYGEN SATURATION: 93 % | DIASTOLIC BLOOD PRESSURE: 84 MMHG | SYSTOLIC BLOOD PRESSURE: 130 MMHG | TEMPERATURE: 99.4 F | RESPIRATION RATE: 18 BRPM | HEART RATE: 81 BPM

## 2019-06-26 DIAGNOSIS — L97.922 SKIN ULCER OF LEFT LOWER LEG WITH FAT LAYER EXPOSED (HCC): ICD-10-CM

## 2019-06-26 DIAGNOSIS — T14.8XXA WOUND INFECTION: ICD-10-CM

## 2019-06-26 DIAGNOSIS — I87.8 CHRONIC VENOUS STASIS: ICD-10-CM

## 2019-06-26 DIAGNOSIS — M21.962 ACQUIRED DEFORMITY OF LEFT ANKLE AND FOOT: ICD-10-CM

## 2019-06-26 DIAGNOSIS — Z72.0 TOBACCO USE: ICD-10-CM

## 2019-06-26 DIAGNOSIS — M25.572 PAIN OF JOINT OF LEFT ANKLE AND FOOT: ICD-10-CM

## 2019-06-26 DIAGNOSIS — L08.9 WOUND INFECTION: ICD-10-CM

## 2019-06-26 PROCEDURE — 99212 OFFICE O/P EST SF 10 MIN: CPT

## 2019-06-26 PROCEDURE — 99212 OFFICE O/P EST SF 10 MIN: CPT | Performed by: NURSE PRACTITIONER

## 2019-06-26 ASSESSMENT — ENCOUNTER SYMPTOMS
SHORTNESS OF BREATH: 0
WEAKNESS: 0
PALPITATIONS: 0
COUGH: 0
FEVER: 0
CHILLS: 0
DIAPHORESIS: 0
CLAUDICATION: 0
BACK PAIN: 0
WHEEZING: 0
NERVOUS/ANXIOUS: 0
FALLS: 0
NAUSEA: 0
EYES NEGATIVE: 1
VOMITING: 0

## 2019-06-26 NOTE — PATIENT INSTRUCTIONS
-Keep dressings clean, dry and covered while bathing. Only change dressings if they become over saturated, soiled or fall off.     -Avoid prolonged standing or sitting without elevating your legs.    -Remove your compression garments if you have severe pain, severe swelling, numbness, color change, or temperature change in your toes. If you need to remove your compression garments, do so by unrolling them. Do not cut the compression garments off, this is to prevent cutting yourself on accident.    -Should you experience any significant changes in your wound(s), such as infection (redness, swelling, localized heat, increased pain, fever > 101 F, chills) or have any questions regarding your home care instructions, please contact the wound center at [phone]. If after hours, contact your primary care physician or go to the hospital emergency room.
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
## 2019-07-02 ENCOUNTER — APPOINTMENT (OUTPATIENT)
Dept: WOUND CARE | Facility: MEDICAL CENTER | Age: 64
End: 2019-07-02
Attending: FAMILY MEDICINE
Payer: MEDICAID

## 2019-07-11 ENCOUNTER — OFFICE VISIT (OUTPATIENT)
Dept: WOUND CARE | Facility: MEDICAL CENTER | Age: 64
End: 2019-07-11
Attending: FAMILY MEDICINE
Payer: MEDICAID

## 2019-07-11 VITALS
SYSTOLIC BLOOD PRESSURE: 144 MMHG | OXYGEN SATURATION: 96 % | TEMPERATURE: 97.6 F | DIASTOLIC BLOOD PRESSURE: 103 MMHG | RESPIRATION RATE: 18 BRPM | HEART RATE: 82 BPM

## 2019-07-11 DIAGNOSIS — I87.8 CHRONIC VENOUS STASIS: ICD-10-CM

## 2019-07-11 DIAGNOSIS — L08.9 INFECTED WOUND: Primary | ICD-10-CM

## 2019-07-11 DIAGNOSIS — M21.962 ACQUIRED DEFORMITY OF LEFT ANKLE AND FOOT: ICD-10-CM

## 2019-07-11 DIAGNOSIS — T14.8XXA INFECTED WOUND: Primary | ICD-10-CM

## 2019-07-11 DIAGNOSIS — L97.922 SKIN ULCER OF LEFT LOWER LEG WITH FAT LAYER EXPOSED (HCC): ICD-10-CM

## 2019-07-11 DIAGNOSIS — M25.572 PAIN OF JOINT OF LEFT ANKLE AND FOOT: ICD-10-CM

## 2019-07-11 DIAGNOSIS — Z72.0 TOBACCO USE: ICD-10-CM

## 2019-07-11 PROCEDURE — 99214 OFFICE O/P EST MOD 30 MIN: CPT | Performed by: NURSE PRACTITIONER

## 2019-07-11 PROCEDURE — 87077 CULTURE AEROBIC IDENTIFY: CPT

## 2019-07-11 PROCEDURE — 99214 OFFICE O/P EST MOD 30 MIN: CPT

## 2019-07-11 PROCEDURE — 87070 CULTURE OTHR SPECIMN AEROBIC: CPT

## 2019-07-11 PROCEDURE — 87186 SC STD MICRODIL/AGAR DIL: CPT

## 2019-07-11 PROCEDURE — 87205 SMEAR GRAM STAIN: CPT

## 2019-07-11 RX ORDER — CIPROFLOXACIN 500 MG/1
500 TABLET, FILM COATED ORAL 2 TIMES DAILY
Qty: 20 TAB | Refills: 0 | Status: SHIPPED | OUTPATIENT
Start: 2019-07-11 | End: 2019-07-13

## 2019-07-11 RX ORDER — PENICILLIN V POTASSIUM 500 MG/1
500 TABLET ORAL 4 TIMES DAILY
Qty: 40 TAB | Refills: 0 | Status: SHIPPED | OUTPATIENT
Start: 2019-07-11 | End: 2019-07-13

## 2019-07-11 ASSESSMENT — ENCOUNTER SYMPTOMS
WHEEZING: 0
WEAKNESS: 0
FEVER: 0
BACK PAIN: 0
EYES NEGATIVE: 1
CHILLS: 0
FALLS: 0
VOMITING: 0
CLAUDICATION: 0
SHORTNESS OF BREATH: 0
NERVOUS/ANXIOUS: 0
PALPITATIONS: 0
DIAPHORESIS: 0
COUGH: 0
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
## 2019-07-11 NOTE — PATIENT INSTRUCTIONS
-Keep dressings clean, dry and covered while bathing. Only change dressings if they become over saturated, soiled or fall off.     -Avoid prolonged standing or sitting without elevating your legs.    -Remove your compression garments if you have severe pain, severe swelling, numbness, color change, or temperature change in your toes.     -Should you experience any significant changes in your wound(s), such as infection (redness, swelling, localized heat, increased pain, fever > 101 F, chills) or have any questions regarding your home care instructions, please contact the wound center at [phone]. If after hours, contact your primary care physician or go to the hospital emergency room.

## 2019-07-12 ENCOUNTER — DOCUMENTATION (OUTPATIENT)
Dept: WOUND CARE | Facility: MEDICAL CENTER | Age: 64
End: 2019-07-12

## 2019-07-12 LAB
GRAM STN SPEC: NORMAL
SIGNIFICANT IND 70042: NORMAL
SITE SITE: NORMAL
SOURCE SOURCE: NORMAL

## 2019-07-13 DIAGNOSIS — L08.9 INFECTED SKIN ULCER WITH FAT LAYER EXPOSED (HCC): ICD-10-CM

## 2019-07-13 DIAGNOSIS — L98.492 INFECTED SKIN ULCER WITH FAT LAYER EXPOSED (HCC): ICD-10-CM

## 2019-07-13 DIAGNOSIS — A49.02 MRSA (METHICILLIN RESISTANT STAPHYLOCOCCUS AUREUS) INFECTION: ICD-10-CM

## 2019-07-13 DIAGNOSIS — A49.8 PSEUDOMONAS INFECTION: ICD-10-CM

## 2019-07-13 RX ORDER — SULFAMETHOXAZOLE AND TRIMETHOPRIM 800; 160 MG/1; MG/1
1 TABLET ORAL 2 TIMES DAILY
Qty: 28 TAB | Refills: 0 | Status: SHIPPED | OUTPATIENT
Start: 2019-07-13 | End: 2019-07-27

## 2019-07-13 RX ORDER — CIPROFLOXACIN 500 MG/1
500 TABLET, FILM COATED ORAL 2 TIMES DAILY
Qty: 28 TAB | Refills: 0 | Status: SHIPPED | OUTPATIENT
Start: 2019-07-13 | End: 2019-07-27

## 2019-07-13 NOTE — PROGRESS NOTES
Contacted by KEV Pham with wound care.  Wound cx from 7/11 +MRSA, Klebsiella and PSAR.  Will give PO Bactrim DS BID and Cipro 500 mg BID x 14 days.  Needs FU with ID.

## 2019-07-14 NOTE — PROGRESS NOTES
[facility] stating that Cipro was denied by insurance and requires ID or ED to prescribe antibiotic.  Patient seen by ID, ID will prescribe antibiotic.

## 2019-07-17 ENCOUNTER — OFFICE VISIT (OUTPATIENT)
Dept: WOUND CARE | Facility: MEDICAL CENTER | Age: 64
End: 2019-07-17
Attending: FAMILY MEDICINE
Payer: MEDICAID

## 2019-07-17 VITALS
DIASTOLIC BLOOD PRESSURE: 95 MMHG | HEART RATE: 80 BPM | RESPIRATION RATE: 16 BRPM | OXYGEN SATURATION: 99 % | TEMPERATURE: 98.3 F | SYSTOLIC BLOOD PRESSURE: 140 MMHG

## 2019-07-17 DIAGNOSIS — L08.9 INFECTED WOUND: ICD-10-CM

## 2019-07-17 DIAGNOSIS — Z72.0 TOBACCO USE: ICD-10-CM

## 2019-07-17 DIAGNOSIS — L97.922 SKIN ULCER OF LEFT LOWER LEG WITH FAT LAYER EXPOSED (HCC): ICD-10-CM

## 2019-07-17 DIAGNOSIS — M25.572 PAIN OF JOINT OF LEFT ANKLE AND FOOT: ICD-10-CM

## 2019-07-17 DIAGNOSIS — I87.8 CHRONIC VENOUS STASIS: ICD-10-CM

## 2019-07-17 DIAGNOSIS — M21.962 ACQUIRED DEFORMITY OF LEFT ANKLE AND FOOT: ICD-10-CM

## 2019-07-17 DIAGNOSIS — T14.8XXA INFECTED WOUND: ICD-10-CM

## 2019-07-17 PROCEDURE — 11042 DBRDMT SUBQ TIS 1ST 20SQCM/<: CPT

## 2019-07-17 PROCEDURE — 11042 DBRDMT SUBQ TIS 1ST 20SQCM/<: CPT | Performed by: NURSE PRACTITIONER

## 2019-07-17 ASSESSMENT — ENCOUNTER SYMPTOMS
EYES NEGATIVE: 1
FEVER: 0
WEAKNESS: 0
CHILLS: 0
CLAUDICATION: 0
VOMITING: 0
DIAPHORESIS: 0
FALLS: 0
BACK PAIN: 0
PALPITATIONS: 0
SHORTNESS OF BREATH: 0
NERVOUS/ANXIOUS: 0
WHEEZING: 0
COUGH: 0

## 2019-07-18 NOTE — PATIENT INSTRUCTIONS
-Keep your wound dressing clean, dry, and intact.    -Change your dressing if it becomes soiled, soaked, or falls off.    -Remove your compression wrap if you have severe pain, severe swelling, numbness, color change, or temperature change in your toes. If you need to remove your compression wrap, do so by unrolling it. Do not cut the compression wrap off to prevent cutting yourself on accident.    -Should you experience any significant changes in your wound(s), such as infection (redness, swelling, localized heat, increased pain, fever > 101 F, chills) or have any questions regarding your home care instructions, please contact the wound center at [phone]. If after hours, contact your primary care physician or go to the hospital emergency room.
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
## 2019-07-23 ENCOUNTER — OFFICE VISIT (OUTPATIENT)
Dept: INFECTIOUS DISEASES | Facility: MEDICAL CENTER | Age: 64
End: 2019-07-23
Payer: MEDICAID

## 2019-07-23 VITALS
WEIGHT: 162 LBS | HEART RATE: 74 BPM | BODY MASS INDEX: 21.94 KG/M2 | HEIGHT: 72 IN | OXYGEN SATURATION: 95 % | TEMPERATURE: 98.4 F | SYSTOLIC BLOOD PRESSURE: 104 MMHG | DIASTOLIC BLOOD PRESSURE: 60 MMHG

## 2019-07-23 DIAGNOSIS — I83.029 VENOUS STASIS ULCER OF LEFT LOWER EXTREMITY (HCC): ICD-10-CM

## 2019-07-23 DIAGNOSIS — L98.492 INFECTED SKIN ULCER WITH FAT LAYER EXPOSED (HCC): ICD-10-CM

## 2019-07-23 DIAGNOSIS — Z72.0 TOBACCO USE: ICD-10-CM

## 2019-07-23 DIAGNOSIS — M14.672 CHARCOT'S JOINT OF LEFT FOOT, NON-DIABETIC: ICD-10-CM

## 2019-07-23 DIAGNOSIS — B18.2 CHRONIC HEPATITIS C WITHOUT HEPATIC COMA (HCC): ICD-10-CM

## 2019-07-23 DIAGNOSIS — L08.9 INFECTED SKIN ULCER WITH FAT LAYER EXPOSED (HCC): ICD-10-CM

## 2019-07-23 DIAGNOSIS — L97.929 VENOUS STASIS ULCER OF LEFT LOWER EXTREMITY (HCC): ICD-10-CM

## 2019-07-23 DIAGNOSIS — R91.1 LUNG NODULE: ICD-10-CM

## 2019-07-23 PROCEDURE — 99212 OFFICE O/P EST SF 10 MIN: CPT | Performed by: INTERNAL MEDICINE
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
## 2019-07-31 ENCOUNTER — HOSPITAL ENCOUNTER (OUTPATIENT)
Dept: LAB | Facility: MEDICAL CENTER | Age: 64
End: 2019-07-31
Attending: INTERNAL MEDICINE
Payer: MEDICAID

## 2019-07-31 ENCOUNTER — OFFICE VISIT (OUTPATIENT)
Dept: WOUND CARE | Facility: MEDICAL CENTER | Age: 64
End: 2019-07-31
Attending: FAMILY MEDICINE
Payer: MEDICAID

## 2019-07-31 VITALS
DIASTOLIC BLOOD PRESSURE: 83 MMHG | TEMPERATURE: 98.2 F | HEART RATE: 51 BPM | SYSTOLIC BLOOD PRESSURE: 126 MMHG | RESPIRATION RATE: 16 BRPM | OXYGEN SATURATION: 97 %

## 2019-07-31 DIAGNOSIS — L97.922 SKIN ULCER OF LEFT LOWER LEG WITH FAT LAYER EXPOSED (HCC): ICD-10-CM

## 2019-07-31 DIAGNOSIS — T14.8XXA INFECTED WOUND: ICD-10-CM

## 2019-07-31 DIAGNOSIS — L08.9 INFECTED WOUND: ICD-10-CM

## 2019-07-31 DIAGNOSIS — M21.962 ACQUIRED DEFORMITY OF LEFT ANKLE AND FOOT: ICD-10-CM

## 2019-07-31 DIAGNOSIS — Z72.0 TOBACCO USE: ICD-10-CM

## 2019-07-31 DIAGNOSIS — M25.572 PAIN OF JOINT OF LEFT ANKLE AND FOOT: ICD-10-CM

## 2019-07-31 DIAGNOSIS — I87.8 CHRONIC VENOUS STASIS: ICD-10-CM

## 2019-07-31 LAB
ALBUMIN SERPL BCP-MCNC: 3.6 G/DL (ref 3.2–4.9)
ALBUMIN/GLOB SERPL: 1.1 G/DL
ALP SERPL-CCNC: 50 U/L (ref 30–99)
ALT SERPL-CCNC: 14 U/L (ref 2–50)
ANION GAP SERPL CALC-SCNC: 6 MMOL/L (ref 0–11.9)
AST SERPL-CCNC: 17 U/L (ref 12–45)
BASOPHILS # BLD AUTO: 0.9 % (ref 0–1.8)
BASOPHILS # BLD: 0.05 K/UL (ref 0–0.12)
BILIRUB SERPL-MCNC: 0.6 MG/DL (ref 0.1–1.5)
BUN SERPL-MCNC: 22 MG/DL (ref 8–22)
CALCIUM SERPL-MCNC: 8.5 MG/DL (ref 8.5–10.5)
CHLORIDE SERPL-SCNC: 108 MMOL/L (ref 96–112)
CO2 SERPL-SCNC: 25 MMOL/L (ref 20–33)
CREAT SERPL-MCNC: 0.98 MG/DL (ref 0.5–1.4)
EOSINOPHIL # BLD AUTO: 0.32 K/UL (ref 0–0.51)
EOSINOPHIL NFR BLD: 5.5 % (ref 0–6.9)
ERYTHROCYTE [DISTWIDTH] IN BLOOD BY AUTOMATED COUNT: 54.8 FL (ref 35.9–50)
GLOBULIN SER CALC-MCNC: 3.3 G/DL (ref 1.9–3.5)
GLUCOSE SERPL-MCNC: 92 MG/DL (ref 65–99)
HCT VFR BLD AUTO: 40.4 % (ref 42–52)
HGB BLD-MCNC: 12.5 G/DL (ref 14–18)
IMM GRANULOCYTES # BLD AUTO: 0.01 K/UL (ref 0–0.11)
IMM GRANULOCYTES NFR BLD AUTO: 0.2 % (ref 0–0.9)
LYMPHOCYTES # BLD AUTO: 1.1 K/UL (ref 1–4.8)
LYMPHOCYTES NFR BLD: 19 % (ref 22–41)
MCH RBC QN AUTO: 29.8 PG (ref 27–33)
MCHC RBC AUTO-ENTMCNC: 30.9 G/DL (ref 33.7–35.3)
MCV RBC AUTO: 96.2 FL (ref 81.4–97.8)
MONOCYTES # BLD AUTO: 0.44 K/UL (ref 0–0.85)
MONOCYTES NFR BLD AUTO: 7.6 % (ref 0–13.4)
NEUTROPHILS # BLD AUTO: 3.88 K/UL (ref 1.82–7.42)
NEUTROPHILS NFR BLD: 66.8 % (ref 44–72)
NRBC # BLD AUTO: 0 K/UL
NRBC BLD-RTO: 0 /100 WBC
PLATELET # BLD AUTO: 243 K/UL (ref 164–446)
PMV BLD AUTO: 9.4 FL (ref 9–12.9)
POTASSIUM SERPL-SCNC: 3.9 MMOL/L (ref 3.6–5.5)
PROT SERPL-MCNC: 6.9 G/DL (ref 6–8.2)
RBC # BLD AUTO: 4.2 M/UL (ref 4.7–6.1)
SODIUM SERPL-SCNC: 139 MMOL/L (ref 135–145)
WBC # BLD AUTO: 5.8 K/UL (ref 4.8–10.8)

## 2019-07-31 PROCEDURE — 11042 DBRDMT SUBQ TIS 1ST 20SQCM/<: CPT | Performed by: NURSE PRACTITIONER

## 2019-07-31 PROCEDURE — 85025 COMPLETE CBC W/AUTO DIFF WBC: CPT

## 2019-07-31 PROCEDURE — 11045 DBRDMT SUBQ TISS EACH ADDL: CPT

## 2019-07-31 PROCEDURE — 11042 DBRDMT SUBQ TIS 1ST 20SQCM/<: CPT

## 2019-07-31 PROCEDURE — 80053 COMPREHEN METABOLIC PANEL: CPT

## 2019-07-31 PROCEDURE — 36415 COLL VENOUS BLD VENIPUNCTURE: CPT

## 2019-07-31 PROCEDURE — 82105 ALPHA-FETOPROTEIN SERUM: CPT

## 2019-07-31 PROCEDURE — 87522 HEPATITIS C REVRS TRNSCRPJ: CPT

## 2019-07-31 PROCEDURE — 11045 DBRDMT SUBQ TISS EACH ADDL: CPT | Performed by: NURSE PRACTITIONER

## 2019-07-31 ASSESSMENT — ENCOUNTER SYMPTOMS
EYES NEGATIVE: 1
SHORTNESS OF BREATH: 0
WEAKNESS: 0
PALPITATIONS: 0
BACK PAIN: 0
CHILLS: 0
FEVER: 0
WHEEZING: 0
CLAUDICATION: 0
DIAPHORESIS: 0
FALLS: 0
VOMITING: 0
COUGH: 0
NERVOUS/ANXIOUS: 0

## 2019-07-31 NOTE — PATIENT INSTRUCTIONS
-Keep your wound dressing clean, dry, and intact.    -Change your dressing if it becomes soiled, soaked, or falls off.    -Remove your compression wrap if you have severe pain, severe swelling, numbness, color change, or temperature change in your toes. If you need to remove your compression wrap, do so by unrolling it. Do not cut the compression wrap off to prevent cutting yourself on accident.    -Should you experience any significant changes in your wound(s), such as infection (redness, swelling, localized heat, increased pain, fever > 101 F, chills) or have any questions regarding your home care instructions, please contact the wound center at [phone]. If after hours, contact your primary care physician or go to the hospital emergency room.
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
## 2019-08-02 LAB — AFP-TM SERPL-MCNC: 1 NG/ML (ref 0–9)

## 2019-08-07 ENCOUNTER — OFFICE VISIT (OUTPATIENT)
Dept: WOUND CARE | Facility: MEDICAL CENTER | Age: 64
End: 2019-08-07
Attending: FAMILY MEDICINE
Payer: MEDICAID

## 2019-08-07 VITALS
TEMPERATURE: 98.3 F | HEART RATE: 60 BPM | DIASTOLIC BLOOD PRESSURE: 81 MMHG | SYSTOLIC BLOOD PRESSURE: 136 MMHG | OXYGEN SATURATION: 99 % | RESPIRATION RATE: 17 BRPM

## 2019-08-07 DIAGNOSIS — T14.8XXA INFECTED WOUND: ICD-10-CM

## 2019-08-07 DIAGNOSIS — L97.922 SKIN ULCER OF LEFT LOWER LEG WITH FAT LAYER EXPOSED (HCC): ICD-10-CM

## 2019-08-07 DIAGNOSIS — L08.9 INFECTED WOUND: ICD-10-CM

## 2019-08-07 DIAGNOSIS — I87.8 CHRONIC VENOUS STASIS: ICD-10-CM

## 2019-08-07 DIAGNOSIS — Z72.0 TOBACCO USE: ICD-10-CM

## 2019-08-07 DIAGNOSIS — M25.572 PAIN OF JOINT OF LEFT ANKLE AND FOOT: ICD-10-CM

## 2019-08-07 PROCEDURE — 11042 DBRDMT SUBQ TIS 1ST 20SQCM/<: CPT | Performed by: NURSE PRACTITIONER

## 2019-08-07 PROCEDURE — 11042 DBRDMT SUBQ TIS 1ST 20SQCM/<: CPT

## 2019-08-07 ASSESSMENT — ENCOUNTER SYMPTOMS
COUGH: 0
PALPITATIONS: 0
WHEEZING: 0
CLAUDICATION: 0
WEAKNESS: 0
BACK PAIN: 0
SHORTNESS OF BREATH: 0
FEVER: 0
FALLS: 0
DIAPHORESIS: 0
CHILLS: 0
NERVOUS/ANXIOUS: 0
VOMITING: 0
EYES NEGATIVE: 1
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
## 2019-08-08 NOTE — PATIENT INSTRUCTIONS
Should you experience any significant changes in your wound(s) such as infection (redness, swelling, localized heat, increased pain, fever >101 F, chills) or have any questions regarding your home care instructions, please contact the wound center [phone]. If after hours, contact your primary care physician or go the hospital emergency room.  Keep dressing clean and dry and cover while bathing. Only change dressing if over saturated, soiled or its falling off.       Educated patient on application of new wound dressing.  Products ordered through [employer] faxed 8/7/2019 at [phone].  Patient informed how to apply Puracyn, Hydrofiber Silver, and Silicone foam dressing.  Patient has daughter who lives with him who can help with dressing changes.

## 2019-08-14 ENCOUNTER — OFFICE VISIT (OUTPATIENT)
Dept: WOUND CARE | Facility: MEDICAL CENTER | Age: 64
End: 2019-08-14
Attending: FAMILY MEDICINE
Payer: MEDICAID

## 2019-08-14 DIAGNOSIS — L08.9 INFECTED WOUND: ICD-10-CM

## 2019-08-14 DIAGNOSIS — M25.572 PAIN OF JOINT OF LEFT ANKLE AND FOOT: ICD-10-CM

## 2019-08-14 DIAGNOSIS — T14.8XXA INFECTED WOUND: ICD-10-CM

## 2019-08-14 DIAGNOSIS — M21.962 ACQUIRED DEFORMITY OF LEFT ANKLE AND FOOT: ICD-10-CM

## 2019-08-14 DIAGNOSIS — L97.922 SKIN ULCER OF LEFT LOWER LEG WITH FAT LAYER EXPOSED (HCC): ICD-10-CM

## 2019-08-14 DIAGNOSIS — I87.8 CHRONIC VENOUS STASIS: ICD-10-CM

## 2019-08-14 DIAGNOSIS — Z72.0 TOBACCO USE: ICD-10-CM

## 2019-08-14 PROCEDURE — 11042 DBRDMT SUBQ TIS 1ST 20SQCM/<: CPT | Performed by: NURSE PRACTITIONER

## 2019-08-14 PROCEDURE — 11042 DBRDMT SUBQ TIS 1ST 20SQCM/<: CPT

## 2019-08-14 ASSESSMENT — ENCOUNTER SYMPTOMS
CHILLS: 0
DIAPHORESIS: 0
NERVOUS/ANXIOUS: 0
PALPITATIONS: 0
COUGH: 0
FALLS: 0
VOMITING: 0
SHORTNESS OF BREATH: 0
CLAUDICATION: 0
EYES NEGATIVE: 1
FEVER: 0
WHEEZING: 0
BACK PAIN: 0
WEAKNESS: 0

## 2019-08-15 VITALS
TEMPERATURE: 98.1 F | SYSTOLIC BLOOD PRESSURE: 142 MMHG | DIASTOLIC BLOOD PRESSURE: 96 MMHG | RESPIRATION RATE: 18 BRPM | HEART RATE: 75 BPM | OXYGEN SATURATION: 95 %
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
## 2019-08-15 NOTE — PATIENT INSTRUCTIONS
Should you experience any significant changes in your wound(s) such as infection (redness, swelling, localized heat, increased pain, fever >101 F, chills) or have any questions regarding your home care instructions, please contact the wound center [phone]. If after hours, contact your primary care physician or go the hospital emergency room.  Keep dressing clean and dry and cover while bathing. Only change dressing if over saturated, soiled or its falling off.

## 2019-08-21 ENCOUNTER — OFFICE VISIT (OUTPATIENT)
Dept: WOUND CARE | Facility: MEDICAL CENTER | Age: 64
End: 2019-08-21
Attending: FAMILY MEDICINE
Payer: MEDICAID

## 2019-08-21 VITALS
RESPIRATION RATE: 16 BRPM | HEART RATE: 50 BPM | DIASTOLIC BLOOD PRESSURE: 102 MMHG | OXYGEN SATURATION: 98 % | SYSTOLIC BLOOD PRESSURE: 170 MMHG | TEMPERATURE: 96.5 F

## 2019-08-21 DIAGNOSIS — L97.922 SKIN ULCER OF LEFT LOWER LEG WITH FAT LAYER EXPOSED (HCC): ICD-10-CM

## 2019-08-21 DIAGNOSIS — M25.572 PAIN OF JOINT OF LEFT ANKLE AND FOOT: ICD-10-CM

## 2019-08-21 DIAGNOSIS — T14.8XXA INFECTED WOUND: ICD-10-CM

## 2019-08-21 DIAGNOSIS — L08.9 INFECTED WOUND: ICD-10-CM

## 2019-08-21 DIAGNOSIS — Z72.0 TOBACCO USE: ICD-10-CM

## 2019-08-21 DIAGNOSIS — M21.962 ACQUIRED DEFORMITY OF LEFT ANKLE AND FOOT: ICD-10-CM

## 2019-08-21 DIAGNOSIS — I87.8 CHRONIC VENOUS STASIS: ICD-10-CM

## 2019-08-21 PROCEDURE — 11042 DBRDMT SUBQ TIS 1ST 20SQCM/<: CPT

## 2019-08-21 PROCEDURE — 11042 DBRDMT SUBQ TIS 1ST 20SQCM/<: CPT | Performed by: NURSE PRACTITIONER

## 2019-08-21 ASSESSMENT — ENCOUNTER SYMPTOMS
FALLS: 0
PALPITATIONS: 0
DIAPHORESIS: 0
NERVOUS/ANXIOUS: 0
BACK PAIN: 0
FEVER: 0
VOMITING: 0
WHEEZING: 0
CLAUDICATION: 0
WEAKNESS: 0
EYES NEGATIVE: 1
CHILLS: 0
COUGH: 0
SHORTNESS OF BREATH: 0

## 2019-08-22 NOTE — PROGRESS NOTES
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 experts from [employer] to optimize the interface.  I have made every reasonable attempt to correct obvious errors, but there may be errors of grammar and possibly content that I did not discover before finalizing the note.

## 2019-08-22 NOTE — PATIENT INSTRUCTIONS
-Keep your wound dressing clean, dry, and intact.    -Change your dressing if it becomes soiled, soaked, or falls off.    -Remove your compression wrap if you have severe pain, severe swelling, numbness, color change, or temperature change in your toes. If you need to remove your compression wrap, do so by unrolling it. Do not cut the compression wrap off to prevent cutting yourself on accident.    -Should you experience any significant changes in your wound(s), such as infection (redness, swelling, localized heat, increased pain, fever > 101 F, chills) or have any questions regarding your home care instructions, please contact the wound center at [phone]. If after hours, contact your primary care physician or go to the hospital emergency room.

## 2019-08-28 ENCOUNTER — OFFICE VISIT (OUTPATIENT)
Dept: WOUND CARE | Facility: MEDICAL CENTER | Age: 64
End: 2019-08-28
Attending: FAMILY MEDICINE
Payer: MEDICAID

## 2019-08-28 VITALS
RESPIRATION RATE: 16 BRPM | OXYGEN SATURATION: 98 % | TEMPERATURE: 97.2 F | DIASTOLIC BLOOD PRESSURE: 87 MMHG | SYSTOLIC BLOOD PRESSURE: 154 MMHG | HEART RATE: 51 BPM

## 2019-08-28 DIAGNOSIS — Z72.0 TOBACCO USE: ICD-10-CM

## 2019-08-28 DIAGNOSIS — I87.8 CHRONIC VENOUS STASIS: ICD-10-CM

## 2019-08-28 DIAGNOSIS — M25.572 PAIN OF JOINT OF LEFT ANKLE AND FOOT: ICD-10-CM

## 2019-08-28 DIAGNOSIS — L97.922 SKIN ULCER OF LEFT LOWER LEG WITH FAT LAYER EXPOSED (HCC): ICD-10-CM

## 2019-08-28 DIAGNOSIS — T14.8XXA INFECTED WOUND: ICD-10-CM

## 2019-08-28 DIAGNOSIS — L08.9 INFECTED WOUND: ICD-10-CM

## 2019-08-28 PROCEDURE — 11042 DBRDMT SUBQ TIS 1ST 20SQCM/<: CPT

## 2019-08-28 PROCEDURE — 11042 DBRDMT SUBQ TIS 1ST 20SQCM/<: CPT | Performed by: NURSE PRACTITIONER

## 2019-08-28 ASSESSMENT — ENCOUNTER SYMPTOMS
BACK PAIN: 0
CLAUDICATION: 0
WHEEZING: 0
FALLS: 0
COUGH: 0
DIAPHORESIS: 0
NERVOUS/ANXIOUS: 0
CHILLS: 0
EYES NEGATIVE: 1
VOMITING: 0
WEAKNESS: 0
PALPITATIONS: 0
FEVER: 0
SHORTNESS OF BREATH: 0

## 2019-08-28 ASSESSMENT — PAIN SCALES - GENERAL: PAINLEVEL: 4=SLIGHT-MODERATE PAIN

## 2019-08-28 NOTE — PATIENT INSTRUCTIONS
Should you experience any significant changes in your wound(s) such as infection (redness, swelling, localized heat, increased pain, fever >101 F, chills) or have any questions regarding your home care instructions, please contact the wound center [phone]. If after hours, contact your primary care physician or go the hospital emergency room.  Keep dressing clean and dry and cover while bathing. Only change dressing if over saturated, soiled or its falling off.

## 2019-08-28 NOTE — PROGRESS NOTES
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 orthopedic surgeon, foot and ankle specialist placed on 6/19/2019   Denied by [facility], referral has been sent to [facility] urgent clinic.  -He was referred to [facility] several weeks ago.  States he got a letter stating he could schedule, though he has not yet called to schedule his appointment.        6.  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  Very limited range of motion of ankle.  Patient is symptomatic, reports pain and discomfort in foot and ankle.    -See above          Please note that this dictation was created using voice recognition software. I have worked with technical experts from [employer] to optimize the interface.  I have made every reasonable attempt to correct obvious errors, but there may be errors of grammar and possibly content that I did not discover before finalizing the note.

## 2019-09-04 ENCOUNTER — OFFICE VISIT (OUTPATIENT)
Dept: WOUND CARE | Facility: MEDICAL CENTER | Age: 64
End: 2019-09-04
Attending: FAMILY MEDICINE
Payer: MEDICAID

## 2019-09-04 VITALS
DIASTOLIC BLOOD PRESSURE: 92 MMHG | SYSTOLIC BLOOD PRESSURE: 136 MMHG | TEMPERATURE: 97.8 F | RESPIRATION RATE: 16 BRPM | HEART RATE: 52 BPM

## 2019-09-04 DIAGNOSIS — L08.9 INFECTED WOUND: ICD-10-CM

## 2019-09-04 DIAGNOSIS — M25.572 PAIN OF JOINT OF LEFT ANKLE AND FOOT: ICD-10-CM

## 2019-09-04 DIAGNOSIS — M21.962 ACQUIRED DEFORMITY OF LEFT ANKLE AND FOOT: ICD-10-CM

## 2019-09-04 DIAGNOSIS — I87.8 CHRONIC VENOUS STASIS: ICD-10-CM

## 2019-09-04 DIAGNOSIS — T14.8XXA INFECTED WOUND: ICD-10-CM

## 2019-09-04 DIAGNOSIS — L97.922 SKIN ULCER OF LEFT LOWER LEG WITH FAT LAYER EXPOSED (HCC): ICD-10-CM

## 2019-09-04 DIAGNOSIS — Z72.0 TOBACCO USE: ICD-10-CM

## 2019-09-04 PROCEDURE — 11042 DBRDMT SUBQ TIS 1ST 20SQCM/<: CPT

## 2019-09-04 PROCEDURE — 11042 DBRDMT SUBQ TIS 1ST 20SQCM/<: CPT | Performed by: NURSE PRACTITIONER

## 2019-09-04 ASSESSMENT — ENCOUNTER SYMPTOMS
FEVER: 0
DIAPHORESIS: 0
BACK PAIN: 0
SHORTNESS OF BREATH: 0
WHEEZING: 0
CHILLS: 0
PALPITATIONS: 0
VOMITING: 0
FALLS: 0
CLAUDICATION: 0
NERVOUS/ANXIOUS: 0
COUGH: 0
WEAKNESS: 0
EYES NEGATIVE: 1

## 2019-09-05 NOTE — PATIENT INSTRUCTIONS
Should you experience any significant changes in your wound(s) such as infection (redness, swelling, localized heat, increased pain, fever >101 F, chills) or have any questions regarding your home care instructions, please contact the wound center [phone]. If after hours, contact your primary care physician or go the hospital emergency room.  Keep dressing clean and dry and cover while bathing. Only change dressing if over saturated, soiled or its falling off.

## 2019-09-05 NOTE — PROGRESS NOTES
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 in the foot and ankle due to old trauma    /4: Referral to orthopedic surgeon, foot and ankle specialist placed on 6/19/2019   Denied by [facility], referral has been sent to [facility] urgent clinic.  -He was referred to [facility] several weeks ago.  States he got a letter stating he could schedule, though he has not yet called to schedule his appointment.        6.  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  Very limited range of motion of ankle.  Patient is symptomatic, reports pain and discomfort in foot and ankle.    -See above          Please note that this dictation was created using voice recognition software. I have worked with technical experts from [employer] to optimize the interface.  I have made every reasonable attempt to correct obvious errors, but there may be errors of grammar and possibly content that I did not discover before finalizing the note.

## 2019-09-11 ENCOUNTER — HOSPITAL ENCOUNTER (OUTPATIENT)
Facility: MEDICAL CENTER | Age: 64
End: 2019-09-11
Attending: NURSE PRACTITIONER
Payer: MEDICAID

## 2019-09-11 ENCOUNTER — OFFICE VISIT (OUTPATIENT)
Dept: WOUND CARE | Facility: MEDICAL CENTER | Age: 64
End: 2019-09-11
Attending: FAMILY MEDICINE
Payer: MEDICAID

## 2019-09-11 VITALS
TEMPERATURE: 97.5 F | SYSTOLIC BLOOD PRESSURE: 135 MMHG | HEART RATE: 70 BPM | RESPIRATION RATE: 18 BRPM | DIASTOLIC BLOOD PRESSURE: 86 MMHG | OXYGEN SATURATION: 96 %

## 2019-09-11 DIAGNOSIS — L97.922 SKIN ULCER OF LEFT LOWER LEG WITH FAT LAYER EXPOSED (HCC): Primary | ICD-10-CM

## 2019-09-11 DIAGNOSIS — T14.8XXA INFECTED WOUND: ICD-10-CM

## 2019-09-11 DIAGNOSIS — L08.9 WOUND INFECTION: ICD-10-CM

## 2019-09-11 DIAGNOSIS — L08.9 INFECTED WOUND: ICD-10-CM

## 2019-09-11 DIAGNOSIS — I87.8 CHRONIC VENOUS STASIS: ICD-10-CM

## 2019-09-11 DIAGNOSIS — T14.8XXA WOUND INFECTION: ICD-10-CM

## 2019-09-11 DIAGNOSIS — Z72.0 TOBACCO USE: ICD-10-CM

## 2019-09-11 PROCEDURE — 87205 SMEAR GRAM STAIN: CPT

## 2019-09-11 PROCEDURE — 87077 CULTURE AEROBIC IDENTIFY: CPT | Mod: 91

## 2019-09-11 PROCEDURE — 87186 SC STD MICRODIL/AGAR DIL: CPT

## 2019-09-11 PROCEDURE — 11042 DBRDMT SUBQ TIS 1ST 20SQCM/<: CPT

## 2019-09-11 PROCEDURE — 87070 CULTURE OTHR SPECIMN AEROBIC: CPT

## 2019-09-11 PROCEDURE — 11042 DBRDMT SUBQ TIS 1ST 20SQCM/<: CPT | Performed by: NURSE PRACTITIONER

## 2019-09-11 ASSESSMENT — ENCOUNTER SYMPTOMS
BACK PAIN: 0
DIAPHORESIS: 0
FEVER: 0
WEAKNESS: 0
SHORTNESS OF BREATH: 0
CLAUDICATION: 0
EYES NEGATIVE: 1
FALLS: 0
CHILLS: 0
PALPITATIONS: 0
VOMITING: 0
NERVOUS/ANXIOUS: 0
COUGH: 0
WHEEZING: 0

## 2019-09-11 ASSESSMENT — PAIN SCALES - GENERAL: PAINLEVEL: 3=SLIGHT PAIN

## 2019-09-12 DIAGNOSIS — L08.9 WOUND INFECTION: ICD-10-CM

## 2019-09-12 DIAGNOSIS — T14.8XXA WOUND INFECTION: ICD-10-CM

## 2019-09-12 LAB
GRAM STN SPEC: NORMAL
SIGNIFICANT IND 70042: NORMAL
SITE SITE: NORMAL
SOURCE SOURCE: NORMAL

## 2019-09-12 NOTE — PROGRESS NOTES
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 completed ablation treatments with Dr. White      4 Tobacco use  Comments: 30+ year history of smoking.  Complicating factor.      -Quit in early 2019, has not restarted        5.  Pain of joint of left ankle and foot  Comments: Pain in the foot and ankle due to old trauma    9/11: Referral to orthopedic surgeon, foot and ankle specialist placed on 6/19/2019   Denied by [facility], referral has been sent to [facility] urgent clinic.  -He was referred to [facility] several weeks ago.  Was informed that [facility] does not take his insurance  -Patient's daughter to check with [facility] orthopedics to see if they accept his insurance        6.  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  Very limited range of motion of ankle.  Patient is symptomatic, reports pain and discomfort in foot and ankle.    -See above          Please note that this dictation was created using voice recognition software. I have worked with technical experts from [employer] to optimize the interface.  I have made every reasonable attempt to correct obvious errors, but there may be errors of grammar and possibly content that I  did not discover before finalizing the note.

## 2019-09-16 DIAGNOSIS — T14.8XXA WOUND INFECTION: ICD-10-CM

## 2019-09-16 DIAGNOSIS — L08.9 WOUND INFECTION: ICD-10-CM

## 2019-09-16 RX ORDER — SULFAMETHOXAZOLE AND TRIMETHOPRIM 800; 160 MG/1; MG/1
1 TABLET ORAL 2 TIMES DAILY
Qty: 20 TAB | Refills: 0 | Status: SHIPPED | OUTPATIENT
Start: 2019-09-16 | End: 2019-09-17

## 2019-09-16 RX ORDER — CIPROFLOXACIN 500 MG/1
500 TABLET, FILM COATED ORAL 2 TIMES DAILY
Qty: 20 TAB | Refills: 0 | Status: SHIPPED | OUTPATIENT
Start: 2019-09-16 | End: 2019-09-26

## 2019-09-16 NOTE — PROGRESS NOTES
Wound culture results reviewed.  Clinic notes and wound photos reviewed.  Rx for Bactrim DS and ciprofloxacin sent to patient's pharmacy.  Unfortunately, culture positive for E. coli which is not sensitive to any p.o. meds..  Patient needs to return to ID.

## 2019-09-17 ENCOUNTER — OFFICE VISIT (OUTPATIENT)
Dept: INFECTIOUS DISEASES | Facility: MEDICAL CENTER | Age: 64
End: 2019-09-17
Payer: MEDICAID

## 2019-09-17 VITALS
BODY MASS INDEX: 23.3 KG/M2 | OXYGEN SATURATION: 95 % | WEIGHT: 172 LBS | HEART RATE: 79 BPM | HEIGHT: 72 IN | SYSTOLIC BLOOD PRESSURE: 140 MMHG | DIASTOLIC BLOOD PRESSURE: 86 MMHG | TEMPERATURE: 99.2 F

## 2019-09-17 DIAGNOSIS — A49.8 PSEUDOMONAS INFECTION: ICD-10-CM

## 2019-09-17 DIAGNOSIS — L98.492 INFECTED SKIN ULCER WITH FAT LAYER EXPOSED (HCC): ICD-10-CM

## 2019-09-17 DIAGNOSIS — L08.9 INFECTED SKIN ULCER WITH FAT LAYER EXPOSED (HCC): ICD-10-CM

## 2019-09-17 DIAGNOSIS — A49.02 MRSA (METHICILLIN RESISTANT STAPHYLOCOCCUS AUREUS) INFECTION: ICD-10-CM

## 2019-09-17 DIAGNOSIS — A49.8 E COLI INFECTION: ICD-10-CM

## 2019-09-17 PROCEDURE — 99214 OFFICE O/P EST MOD 30 MIN: CPT | Performed by: NURSE PRACTITIONER

## 2019-09-17 RX ORDER — MINOCYCLINE HYDROCHLORIDE 100 MG/1
100 CAPSULE ORAL 2 TIMES DAILY
Qty: 21 CAP | Refills: 0 | Status: SHIPPED | OUTPATIENT
Start: 2019-09-17 | End: 2019-09-27

## 2019-09-17 RX ORDER — POLYETHYLENE GLYCOL 3350, SODIUM CHLORIDE, SODIUM BICARBONATE, POTASSIUM CHLORIDE 420; 11.2; 5.72; 1.48 G/4L; G/4L; G/4L; G/4L
POWDER, FOR SOLUTION ORAL
COMMUNITY
Start: 2019-08-09 | End: 2019-12-26

## 2019-09-17 NOTE — PROGRESS NOTES
Infectious Disease Clinic    Subjective:     Chief Complaint   Patient presents with   • Follow-Up     Infected skin ulcer with fat layer exposed     This is my first time meeting Mr. Chavez.  Accompanied by his daughter, Megan.    Interval History: 64-year-old male with a PMH of HCV, tobacco abuse, polysubstance abuse, and left Charcot foot in a nondiabetic with a chronic LLE ulcer.  Seen by ID in December 2018 for infected ulcer of LLE, wound cultures at that time positive Pseudomonas, MRSA, E faecalis and Klebsiella.  Discharged home on p.o. ciprofloxacin and Zyvox at that time.  Returned to ID July 2019 due to persistent wound positive Pseudomonas, Klebsiella and MRSA, treated with p.o. Bactrim and ciprofloxacin at that time without additional ID follow-up.     Records reviewed    Today, 9/17/2019: Returns to ID due to positive polymicrobial wound culture.  Patient reports feeling well and has been tolerating the p.o. ciprofloxacin and Bactrim without adverse effect.  Denies feeling generally ill, fevers/chills, general malaise, headache, n/v/d, abdominal pain, chest pain or shortness of breath.  Pt stating that the lower extremity wound is doing okay.  He denies there being an excessive drainage, no odor, very little redness and very little swelling.  States he has intermittent LLE 10/10 pain that is a throbbing sensation, which typically goes away on its own or with elevation.  Notes that he quit smoking 9 months ago.    Review of Systems   Constitutional: Negative for chills and fever.   HENT: Negative for congestion and sore throat.    Respiratory: Negative for shortness of breath.    Cardiovascular: Positive for leg swelling. Negative for chest pain.   Gastrointestinal: Negative for abdominal pain, constipation, diarrhea, nausea and vomiting.   Genitourinary: Negative for dysuria.   Musculoskeletal: Negative for joint pain and myalgias.   Skin: Negative for rash.   Neurological: Positive for sensory  change. Negative for headaches.   Psychiatric/Behavioral: The patient is not nervous/anxious.        Past Medical History:   Diagnosis Date   • Anxiety    • Charcot's joint of left foot, non-diabetic 3/21/2016   • Chronic congestive heart failure (HCC) 2017   • Hepatitis C, chronic (HCC)    • Hypertension    • Migraine    • Polysubstance abuse (HCC) 3/8/2018   • Tobacco use 2016   • Ulcer of left lower extremity with necrosis of muscle (HCC) 3/21/2016   • Venous stasis ulcer (HCC) 2017    bilateral lower extremity       Family History   Problem Relation Age of Onset   • Lung Disease Mother 70         from COPD   • Hypertension Mother    • Other Father 82         from brain aneurysm after fall   • Cancer Neg Hx    • Heart Disease Neg Hx    • Stroke Neg Hx    • Diabetes Neg Hx        Social History     Tobacco Use   • Smoking status: Former Smoker     Packs/day: 0.00     Years: 48.00     Pack years: 0.00     Types: Cigarettes     Last attempt to quit: 2018     Years since quittin.7   • Smokeless tobacco: Never Used   • Tobacco comment: states he is using 7mg nicotine patch   Substance Use Topics   • Alcohol use: No     Alcohol/week: 7.2 oz     Types: 12 Cans of beer per week     Comment: history of alcoholism    • Drug use: Yes     Types: Marijuana, Inhaled, Methamphetamines     Comment: MJ every day, intermittent meth use       Allergies: Norco [hydrocodone-acetaminophen]    Pt's medication and problem list reviewed.     Objective:     PE:  /86 (BP Location: Left arm, Patient Position: Sitting, BP Cuff Size: Adult)   Pulse 79   Temp 37.3 °C (99.2 °F) (Temporal)   Ht 1.829 m (6')   Wt 78 kg (172 lb)   SpO2 95%   BMI 23.33 kg/m²     Physical Exam   Constitutional: He is oriented to person, place, and time and well-developed, well-nourished, and in no distress. No distress.   Disheveled   HENT:   Head: Normocephalic and atraumatic.   Mouth/Throat: Oropharynx is clear and moist. No  oropharyngeal exudate.   Poor dentition-missing teeth   Eyes: Pupils are equal, round, and reactive to light. Conjunctivae and EOM are normal. No scleral icterus.   Neck: Normal range of motion. Neck supple. No tracheal deviation present.   Cardiovascular: Normal rate, regular rhythm and normal heart sounds.   No murmur heard.  Able to palpate LLE distal pulse due to dressing.   Pulmonary/Chest: Effort normal and breath sounds normal. No respiratory distress. He has no wheezes. He has no rales.   Abdominal: Soft. Bowel sounds are normal. He exhibits no distension. There is no tenderness.   Musculoskeletal: He exhibits edema (Trace LLE). He exhibits no tenderness.   Dressings in place, pictures from 9/11/2019 care reviewed:  LLE posterior distal-4.2 x 1.7 x 0.1 cm, wound bed pink/red, no maceration, trace diffuse erythema to surrounding tissue.  LLE anterior-2.5 x 5 x 0.1 cm, wound bed yellow/red/pink, no maceration, trace diffuse erythema to surrounding tissue.   Neurological: He is alert and oriented to person, place, and time. No cranial nerve deficit.   Shuffling gait   Skin: Skin is warm and dry. No rash noted. He is not diaphoretic. There is erythema.   Psychiatric: Mood and memory normal.   Flat affect  Slow to respond to questions   Vitals reviewed.    Labs:  WBC   Date/Time Value Ref Range Status   07/31/2019 02:10 PM 5.8 4.8 - 10.8 K/uL Final     RBC   Date/Time Value Ref Range Status   07/31/2019 02:10 PM 4.20 (L) 4.70 - 6.10 M/uL Final     Hemoglobin   Date/Time Value Ref Range Status   07/31/2019 02:10 PM 12.5 (L) 14.0 - 18.0 g/dL Final     Hematocrit   Date/Time Value Ref Range Status   07/31/2019 02:10 PM 40.4 (L) 42.0 - 52.0 % Final     MCV   Date/Time Value Ref Range Status   07/31/2019 02:10 PM 96.2 81.4 - 97.8 fL Final     MCH   Date/Time Value Ref Range Status   07/31/2019 02:10 PM 29.8 27.0 - 33.0 pg Final     MCHC   Date/Time Value Ref Range Status   07/31/2019 02:10 PM 30.9 (L) 33.7 - 35.3 g/dL  Final     MPV   Date/Time Value Ref Range Status   07/31/2019 02:10 PM 9.4 9.0 - 12.9 fL Final        Sodium   Date/Time Value Ref Range Status   07/31/2019 02:10  135 - 145 mmol/L Final     Potassium   Date/Time Value Ref Range Status   07/31/2019 02:10 PM 3.9 3.6 - 5.5 mmol/L Final     Chloride   Date/Time Value Ref Range Status   07/31/2019 02:10  96 - 112 mmol/L Final     Co2   Date/Time Value Ref Range Status   07/31/2019 02:10 PM 25 20 - 33 mmol/L Final     Glucose   Date/Time Value Ref Range Status   07/31/2019 02:10 PM 92 65 - 99 mg/dL Final     Bun   Date/Time Value Ref Range Status   07/31/2019 02:10 PM 22 8 - 22 mg/dL Final     Creatinine   Date/Time Value Ref Range Status   07/31/2019 02:10 PM 0.98 0.50 - 1.40 mg/dL Final       Alkaline Phosphatase   Date/Time Value Ref Range Status   07/31/2019 02:10 PM 50 30 - 99 U/L Final     AST(SGOT)   Date/Time Value Ref Range Status   07/31/2019 02:10 PM 17 12 - 45 U/L Final     ALT(SGPT)   Date/Time Value Ref Range Status   07/31/2019 02:10 PM 14 2 - 50 U/L Final     Total Bilirubin   Date/Time Value Ref Range Status   07/31/2019 02:10 PM 0.6 0.1 - 1.5 mg/dL Final      Microbiology:  09/11/19   Source WND Site LEFT LEG   Susceptibility      Escherichia coli Pseudomonas aeruginosa     SHANON SHANON     Amikacin   <=16 mcg/mL Sensitive     Ampicillin >16 mcg/mL Resistant       Ampicillin/sulbactam >16/8 mcg/mL Resistant       Cefazolin <=2 mcg/mL Sensitive       Cefepime <=2 mcg/mL Sensitive 8 mcg/mL Sensitive     Cefotaxime <=2 mcg/mL Sensitive       Cefotetan <=16 mcg/mL Sensitive       Ceftazidime <=1 mcg/mL Sensitive <=1 mcg/mL Sensitive     Ceftriaxone <=1 mcg/mL Sensitive       Ciprofloxacin >2 mcg/mL Resistant <=1 mcg/mL Sensitive     Ertapenem <=0.5 mcg/mL Sensitive       Gentamicin >8 mcg/mL Resistant 8 mcg/mL Intermediate     Meropenem   <=1 mcg/mL Sensitive     Pip/Tazobactam <=16 mcg/mL Sensitive <=16 mcg/mL Sensitive     Tobramycin >8 mcg/mL  Resistant <=4 mcg/mL Sensitive     Trimeth/Sulfa >2/38 mcg/mL Resistant               Susceptibility      Methicillin resistant staphylococcus aureus     SHANON     Ampicillin/sulbactam 16/8 mcg/mL Resistant     Clindamycin <=0.5 mcg/mL Sensitive     Daptomycin <=0.5 mcg/mL Sensitive     Erythromycin >4 mcg/mL Resistant     Moxifloxacin 2 mcg/mL Sensitive     Oxacillin >2 mcg/mL Resistant     Penicillin >8 mcg/mL Resistant     Tetracycline <=4 mcg/mL Sensitive     Trimeth/Sulfa <=0.5/9.5 m... Sensitive     Vancomycin 2 mcg/mL Sensitive      Assessment and Plan:   The following treatment plan was discussed with patient at length:    1. Infected skin ulcer with fat layer exposed (HCC)  minocycline (MINOCIN) 100 MG Cap    -Continue p.o. ciprofloxacin for Pseudomonas coverage.  Advised to monitor for tendon rupture, numbness tingling to hands feet, heart palpitations and altered mental status on ciprofloxacin.  -Stop p.o. Bactrim.  Start p.o. minocycline for E. coli and MRSA x10 days.  -If wound not improving then extend antibiotics; otherwise, monitor for s/sx of worsening off abx: increased redness, pain, swelling, drainage, breakdown of wound site, fevers, chills, general malaise, etc.  Notify ID or go to ER should these s/sx occur.   2. MRSA (methicillin resistant Staphylococcus aureus) infection      Minocycline as above   3. Pseudomonas infection      Ciprofloxacin as above   4. E coli infection      Minocycline as above     Follow up: 10 days; however, may cancel appointment if LLE is healing well without issue. RTC sooner if needed. FU with PCP for ongoing chronic medical conditions.     KALLIE Díaz.    Case discussed with Kristine Schmid, ID pharmacist and KEV Brantley with wound care.       Please note that this dictation was created using voice recognition software. I have  worked with technical experts from [employer] to optimize the interface.  I have made every reasonable attempt to  correct obvious errors, but there may be errors of grammar and possibly content that I did not discover before finalizing the note.

## 2019-09-18 ENCOUNTER — OFFICE VISIT (OUTPATIENT)
Dept: WOUND CARE | Facility: MEDICAL CENTER | Age: 64
End: 2019-09-18
Attending: FAMILY MEDICINE
Payer: MEDICAID

## 2019-09-18 VITALS
DIASTOLIC BLOOD PRESSURE: 80 MMHG | RESPIRATION RATE: 16 BRPM | SYSTOLIC BLOOD PRESSURE: 122 MMHG | OXYGEN SATURATION: 95 % | TEMPERATURE: 97.4 F | HEART RATE: 75 BPM

## 2019-09-18 DIAGNOSIS — T14.8XXA WOUND INFECTION: ICD-10-CM

## 2019-09-18 DIAGNOSIS — Z72.0 TOBACCO USE: ICD-10-CM

## 2019-09-18 DIAGNOSIS — L97.922 SKIN ULCER OF LEFT LOWER LEG WITH FAT LAYER EXPOSED (HCC): ICD-10-CM

## 2019-09-18 DIAGNOSIS — L08.9 WOUND INFECTION: ICD-10-CM

## 2019-09-18 DIAGNOSIS — M25.572 PAIN OF JOINT OF LEFT ANKLE AND FOOT: ICD-10-CM

## 2019-09-18 DIAGNOSIS — I87.8 CHRONIC VENOUS STASIS: ICD-10-CM

## 2019-09-18 DIAGNOSIS — M21.962 ACQUIRED DEFORMITY OF LEFT ANKLE AND FOOT: ICD-10-CM

## 2019-09-18 PROCEDURE — 11042 DBRDMT SUBQ TIS 1ST 20SQCM/<: CPT

## 2019-09-18 PROCEDURE — 11042 DBRDMT SUBQ TIS 1ST 20SQCM/<: CPT | Performed by: NURSE PRACTITIONER

## 2019-09-18 ASSESSMENT — ENCOUNTER SYMPTOMS
CLAUDICATION: 0
SHORTNESS OF BREATH: 0
PALPITATIONS: 0
CHILLS: 0
EYES NEGATIVE: 1
VOMITING: 0
BACK PAIN: 0
NERVOUS/ANXIOUS: 0
COUGH: 0
FALLS: 0
DIAPHORESIS: 0
FEVER: 0
WHEEZING: 0
WEAKNESS: 0

## 2019-09-18 NOTE — PATIENT INSTRUCTIONS
-Keep dressings clean, dry and covered while bathing. Only change dressings if they become over saturated, soiled or fall off.     -Avoid prolonged standing or sitting without elevating your legs.    -Remove your compression garments if you have severe pain, severe swelling, numbness, color change, or temperature change in your toes. If you need to remove your compression garments, do so by unrolling them. Do not cut the compression garments off, this is to prevent cutting yourself on accident.    -Should you experience any significant changes in your wound(s), such as infection (redness, swelling, localized heat, increased pain, fever > 101 F, chills) or have any questions regarding your home care instructions, please contact the wound center at [phone]. If after hours, contact your primary care physician or go to the hospital emergency room.

## 2019-09-19 NOTE — PROGRESS NOTES
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 technical experts from [employer] to optimize the interface.  I have made every reasonable attempt to correct obvious errors, but there may be errors of grammar and possibly content that I did not discover before finalizing the note.

## 2019-09-20 ASSESSMENT — ENCOUNTER SYMPTOMS
NAUSEA: 0
ABDOMINAL PAIN: 0
CHILLS: 0
HEADACHES: 0
MYALGIAS: 0
SENSORY CHANGE: 1
SHORTNESS OF BREATH: 0
SORE THROAT: 0
DIARRHEA: 0
FEVER: 0
VOMITING: 0
NERVOUS/ANXIOUS: 0
CONSTIPATION: 0

## 2019-09-25 ENCOUNTER — OFFICE VISIT (OUTPATIENT)
Dept: WOUND CARE | Facility: MEDICAL CENTER | Age: 64
End: 2019-09-25
Attending: FAMILY MEDICINE
Payer: MEDICAID

## 2019-09-25 VITALS
SYSTOLIC BLOOD PRESSURE: 155 MMHG | RESPIRATION RATE: 16 BRPM | TEMPERATURE: 98.1 F | OXYGEN SATURATION: 95 % | DIASTOLIC BLOOD PRESSURE: 101 MMHG | HEART RATE: 78 BPM

## 2019-09-25 DIAGNOSIS — T14.8XXA WOUND INFECTION: ICD-10-CM

## 2019-09-25 DIAGNOSIS — Z72.0 TOBACCO USE: ICD-10-CM

## 2019-09-25 DIAGNOSIS — L97.922 SKIN ULCER OF LEFT LOWER LEG WITH FAT LAYER EXPOSED (HCC): ICD-10-CM

## 2019-09-25 DIAGNOSIS — I87.8 CHRONIC VENOUS STASIS: ICD-10-CM

## 2019-09-25 DIAGNOSIS — L08.9 WOUND INFECTION: ICD-10-CM

## 2019-09-25 DIAGNOSIS — M21.962 ACQUIRED DEFORMITY OF LEFT ANKLE AND FOOT: ICD-10-CM

## 2019-09-25 DIAGNOSIS — M25.572 PAIN OF JOINT OF LEFT ANKLE AND FOOT: ICD-10-CM

## 2019-09-25 PROCEDURE — 11042 DBRDMT SUBQ TIS 1ST 20SQCM/<: CPT

## 2019-09-25 PROCEDURE — 11042 DBRDMT SUBQ TIS 1ST 20SQCM/<: CPT | Performed by: NURSE PRACTITIONER

## 2019-09-25 ASSESSMENT — ENCOUNTER SYMPTOMS
CLAUDICATION: 0
WEAKNESS: 0
BACK PAIN: 0
FEVER: 0
WHEEZING: 0
SHORTNESS OF BREATH: 0
EYES NEGATIVE: 1
NERVOUS/ANXIOUS: 0
DIAPHORESIS: 0
CHILLS: 0
PALPITATIONS: 0
VOMITING: 0
FALLS: 0
COUGH: 0

## 2019-09-25 NOTE — PATIENT INSTRUCTIONS
Should you experience any significant changes in your wound(s) such as infection (redness, swelling, localized heat, increased pain, fever >101 F, chills) or have any questions regarding your home care instructions, please contact the wound center [phone]. If after hours, contact your primary care physician or go the hospital emergency room.  Keep dressing clean and dry and cover while bathing. Only change dressing if over saturated, soiled or its falling off.

## 2019-09-26 NOTE — PROGRESS NOTES
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 [facility] urgent care.  Advised him to schedule appointment with them        6.  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  Very limited range of motion of ankle.  Patient is symptomatic, reports pain and discomfort in foot and ankle.    -See above          Please note that this dictation was created using voice recognition software. I have worked with technical experts from [employer] to optimize the interface.  I have made every reasonable attempt to correct obvious errors, but there may be errors of grammar and possibly content that I did not discover before finalizing the note.

## 2019-10-01 ENCOUNTER — OFFICE VISIT (OUTPATIENT)
Dept: INFECTIOUS DISEASES | Facility: MEDICAL CENTER | Age: 64
End: 2019-10-01
Payer: MEDICAID

## 2019-10-01 VITALS
DIASTOLIC BLOOD PRESSURE: 76 MMHG | HEIGHT: 72 IN | HEART RATE: 66 BPM | OXYGEN SATURATION: 99 % | TEMPERATURE: 98.9 F | WEIGHT: 172 LBS | BODY MASS INDEX: 23.3 KG/M2 | SYSTOLIC BLOOD PRESSURE: 122 MMHG

## 2019-10-01 DIAGNOSIS — A49.8 E COLI INFECTION: ICD-10-CM

## 2019-10-01 DIAGNOSIS — A49.02 MRSA (METHICILLIN RESISTANT STAPHYLOCOCCUS AUREUS) INFECTION: ICD-10-CM

## 2019-10-01 DIAGNOSIS — L98.492 INFECTED SKIN ULCER WITH FAT LAYER EXPOSED (HCC): ICD-10-CM

## 2019-10-01 DIAGNOSIS — A49.8 PSEUDOMONAS INFECTION: ICD-10-CM

## 2019-10-01 DIAGNOSIS — L08.9 INFECTED SKIN ULCER WITH FAT LAYER EXPOSED (HCC): ICD-10-CM

## 2019-10-01 PROCEDURE — 99213 OFFICE O/P EST LOW 20 MIN: CPT | Performed by: NURSE PRACTITIONER

## 2019-10-01 RX ORDER — MINOCYCLINE HYDROCHLORIDE 100 MG/1
100 CAPSULE ORAL 2 TIMES DAILY
COMMUNITY
End: 2019-12-26

## 2019-10-01 ASSESSMENT — ENCOUNTER SYMPTOMS
HEADACHES: 0
FEVER: 0
CONSTIPATION: 0
MYALGIAS: 1
COUGH: 0
CHILLS: 0
SENSORY CHANGE: 1
TINGLING: 1
NAUSEA: 0
SHORTNESS OF BREATH: 0
ABDOMINAL PAIN: 0
VOMITING: 0
SORE THROAT: 0
NERVOUS/ANXIOUS: 0
DIARRHEA: 0
BACK PAIN: 0

## 2019-10-01 NOTE — PROGRESS NOTES
[facility]    Subjective:     Chief Complaint   Patient presents with   • Follow-Up     Infected skin ulcer with fat layer exposed      Interval History: 64-year-old male with a PMH of HCV, tobacco abuse, polysubstance abuse, and left Charcot foot in a nondiabetic with a chronic LLE ulcer.  Seen by ID in December 2018 for infected ulcer of LLE, wound cultures at that time positive Pseudomonas, MRSA, E faecalis and Klebsiella.  Discharged home on p.o. ciprofloxacin and Zyvox at that time.  Returned to ID July 2019 due to persistent wound positive Pseudomonas, Klebsiella and MRSA, treated with p.o. Bactrim and ciprofloxacin at that time without additional ID follow-up.     9/17/2019:  Seen by KEV Kendrick.  Returns to ID due to positive polymicrobial wound culture.  Pt stating that the lower extremity wound is doing okay.  He denies there being an excessive drainage, no odor, very little redness and very little swelling.  Continue p.o. ciprofloxacin for Pseudomonas coverage.  Stop p.o. Bactrim.  Start p.o. minocycline for E. coli and MRSA x10 days.    Today, 10/1/2019: Finished p.o. ciprofloxacin last night and has 1 dose of minocycline left.  States he has been tolerating both medications without any issues.  Denies feeling generally ill, fevers/chills, general malaise, headache, n/v/d, abdominal pain, chest pain or shortness of breath.  Denies tendon pain, heart racing or numbness tingling to hands feet.  Continues to have chronic 10/10 left heel pain that is worse with walking, described as a throbbing sensation.  Patient's daughter notes that patient is to attempting to be evaluated by orthopedics; however, both [facility] and [facility] do not take his insurance.  Change to abstain from smoking.    Review of Systems   Constitutional: Negative for chills, fever and malaise/fatigue.   HENT: Negative for congestion and sore throat.    Respiratory: Negative for cough and shortness of breath.     Cardiovascular: Positive for leg swelling. Negative for chest pain.   Gastrointestinal: Negative for abdominal pain, constipation, diarrhea, nausea and vomiting.   Genitourinary: Negative for dysuria.   Musculoskeletal: Positive for joint pain and myalgias. Negative for back pain.   Skin: Negative for rash.   Neurological: Positive for tingling and sensory change. Negative for headaches.   Psychiatric/Behavioral: The patient is not nervous/anxious.        Past Medical History:   Diagnosis Date   • Anxiety    • Charcot's joint of left foot, non-diabetic 3/21/2016   • Chronic congestive heart failure (HCC) 2017   • Hepatitis C, chronic (HCC)    • Hypertension    • Migraine    • Polysubstance abuse (HCC) 3/8/2018   • Tobacco use 2016   • Ulcer of left lower extremity with necrosis of muscle (HCC) 3/21/2016   • Venous stasis ulcer (HCC) 2017    bilateral lower extremity       Family History   Problem Relation Age of Onset   • Lung Disease Mother 70         from COPD   • Hypertension Mother    • Other Father 82         from brain aneurysm after fall   • Cancer Neg Hx    • Heart Disease Neg Hx    • Stroke Neg Hx    • Diabetes Neg Hx        Social History     Tobacco Use   • Smoking status: Former Smoker     Packs/day: 0.00     Years: 48.00     Pack years: 0.00     Types: Cigarettes     Last attempt to quit: 2018     Years since quittin.8   • Smokeless tobacco: Never Used   • Tobacco comment: states he is using 7mg nicotine patch   Substance Use Topics   • Alcohol use: No     Alcohol/week: 7.2 oz     Types: 12 Cans of beer per week     Comment: history of alcoholism    • Drug use: Yes     Types: Marijuana, Inhaled, Methamphetamines     Comment: MJ every day, intermittent meth use       Allergies: Norco [hydrocodone-acetaminophen]    Pt's medication and problem list reviewed.     Objective:     PE:  /76 (BP Location: Left arm, Patient Position: Sitting, BP Cuff Size: Adult)   Pulse 66    Temp 37.2 °C (98.9 °F) (Temporal)   Ht 1.829 m (6')   Wt 78 kg (172 lb)   SpO2 99%   BMI 23.33 kg/m²     Physical Exam   Constitutional: He is oriented to person, place, and time and well-developed, well-nourished, and in no distress. No distress.   HENT:   Head: Normocephalic and atraumatic.   Mouth/Throat: Oropharynx is clear and moist. No oropharyngeal exudate.   Poor dentition-missing teeth   Eyes: Pupils are equal, round, and reactive to light. Conjunctivae and EOM are normal. No scleral icterus.   Neck: Normal range of motion. Neck supple. No JVD present. No tracheal deviation present.   Cardiovascular: Normal rate, regular rhythm and normal heart sounds.   No murmur heard.  Unable to palpate LLE distal pulse due to dressing   Pulmonary/Chest: Effort normal and breath sounds normal. No respiratory distress. He has no wheezes.   Abdominal: Soft. Bowel sounds are normal. He exhibits no distension. There is no tenderness.   Musculoskeletal: He exhibits no edema or tenderness.   Dressings in place, pictures from 9/25/2019 reviewed:  LLE anterior wound resolved  LLE posterior distal-3.7 x 1 x 0.2 cm, wound bed pink, no maceration, trace erythematous surrounding tissue.   Neurological: He is alert and oriented to person, place, and time.   Shuffling gait, cane use for ambulatory assistance.   Skin: Skin is warm and dry. No rash noted. He is not diaphoretic. There is erythema.   Psychiatric: Mood, memory, affect and judgment normal.   Slow to respond to questions   Vitals reviewed.    Assessment and Plan:   The following treatment plan was discussed with patient at length:    1. Infected skin ulcer with fat layer exposed (HCC)      -Completed p.o. ciprofloxacin last night.  Will complete p.o. minocycline tonight.  No need for additional antibiotics as there are no signs of active infection.  -Monitor for s/sx of worsening off abx: increased redness, pain, swelling, drainage, breakdown of wound site, fevers,  chills, general malaise, etc.  Notify ID or go to ER should these s/sx occur.  -Continue with wound care as directed.   2. MRSA (methicillin resistant Staphylococcus aureus) infection      Minocycline as above   3. Pseudomonas infection      Ciprofloxacin as above   4. E coli infection      Minocycline as above     Follow up: PRN. RTC sooner if needed. FU with PCP for ongoing chronic medical conditions.     Jennifer Robb A.P.R.N.       Please note that this dictation was created using voice recognition software. I have  worked with technical experts from [facility] to optimize the interface.  I have made every reasonable attempt to correct obvious errors, but there may be errors of grammar and possibly content that I did not discover before finalizing the note.

## 2019-10-03 ENCOUNTER — NON-PROVIDER VISIT (OUTPATIENT)
Dept: WOUND CARE | Facility: MEDICAL CENTER | Age: 64
End: 2019-10-03
Attending: FAMILY MEDICINE
Payer: MEDICAID

## 2019-10-03 PROCEDURE — 97597 DBRDMT OPN WND 1ST 20 CM/<: CPT

## 2019-10-03 NOTE — PATIENT INSTRUCTIONS
Should you experience any significant changes in your wound(s) such as infection (redness, swelling, localized heat, increased pain, fever >101 F, chills) or have any questions regarding your home care instructions, please contact the wound center [phone]. If after hours, contact your primary care physician or go the hospital emergency room.  Keep dressing clean and dry and cover while bathing. Only change dressing if over saturated, soiled or its falling off.

## 2019-10-09 ENCOUNTER — OFFICE VISIT (OUTPATIENT)
Dept: WOUND CARE | Facility: MEDICAL CENTER | Age: 64
End: 2019-10-09
Attending: FAMILY MEDICINE
Payer: MEDICAID

## 2019-10-09 VITALS
DIASTOLIC BLOOD PRESSURE: 89 MMHG | SYSTOLIC BLOOD PRESSURE: 154 MMHG | OXYGEN SATURATION: 99 % | HEART RATE: 68 BPM | RESPIRATION RATE: 16 BRPM | TEMPERATURE: 98.7 F

## 2019-10-09 DIAGNOSIS — M21.962 ACQUIRED DEFORMITY OF LEFT ANKLE AND FOOT: ICD-10-CM

## 2019-10-09 DIAGNOSIS — L97.922 SKIN ULCER OF LEFT LOWER LEG WITH FAT LAYER EXPOSED (HCC): ICD-10-CM

## 2019-10-09 DIAGNOSIS — Z72.0 TOBACCO USE: ICD-10-CM

## 2019-10-09 DIAGNOSIS — T14.8XXA WOUND INFECTION: ICD-10-CM

## 2019-10-09 DIAGNOSIS — I87.8 CHRONIC VENOUS STASIS: ICD-10-CM

## 2019-10-09 DIAGNOSIS — L08.9 WOUND INFECTION: ICD-10-CM

## 2019-10-09 DIAGNOSIS — M25.572 PAIN OF JOINT OF LEFT ANKLE AND FOOT: ICD-10-CM

## 2019-10-09 PROCEDURE — 11042 DBRDMT SUBQ TIS 1ST 20SQCM/<: CPT | Performed by: NURSE PRACTITIONER

## 2019-10-09 PROCEDURE — 11042 DBRDMT SUBQ TIS 1ST 20SQCM/<: CPT

## 2019-10-09 ASSESSMENT — ENCOUNTER SYMPTOMS
WHEEZING: 0
BACK PAIN: 0
NERVOUS/ANXIOUS: 0
PALPITATIONS: 0
EYES NEGATIVE: 1
DIAPHORESIS: 0
WEAKNESS: 0
VOMITING: 0
CLAUDICATION: 0
SHORTNESS OF BREATH: 0
FEVER: 0
COUGH: 0
FALLS: 0
CHILLS: 0

## 2019-10-09 NOTE — PATIENT INSTRUCTIONS
Should you experience any significant changes in your wound(s) such as infection (redness, swelling, localized heat, increased pain, fever >101 F, chills) or have any questions regarding your home care instructions, please contact the wound center [phone]. If after hours, contact your primary care physician or go the hospital emergency room.  Keep dressing clean and dry and cover while bathing. Only change dressing if over saturated, soiled or its falling off.

## 2019-10-09 NOTE — PROGRESS NOTES
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 on 6/19/2019     -He was referred to [facility] but was informed that [facility] does not take his insurance  -[facility] also does not accept his insurance  -His referral was preferred accepted by [facility] urgent care.  Advised him to schedule appointment with them  -Consider LPS rounds if patient is not accepted by [facility]        6.  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  Very limited range of motion of ankle.  Patient is symptomatic, reports pain and discomfort in foot and ankle.    -See above          Please note that this dictation was created using voice recognition software. I have worked with technical experts from [employer] to optimize the interface.  I have made every reasonable attempt to correct obvious errors, but there may be errors of grammar and possibly content that I did not discover before finalizing the note.

## 2019-10-16 ENCOUNTER — NON-PROVIDER VISIT (OUTPATIENT)
Dept: WOUND CARE | Facility: MEDICAL CENTER | Age: 64
End: 2019-10-16
Attending: FAMILY MEDICINE
Payer: MEDICAID

## 2019-10-16 PROCEDURE — 97597 DBRDMT OPN WND 1ST 20 CM/<: CPT

## 2019-10-16 NOTE — PATIENT INSTRUCTIONS
Should you experience any significant changes in your wound(s) such as infection (redness, swelling, localized heat, increased pain, fever >101 F, chills) or have any questions regarding your home care instructions, please contact the wound center [phone]. If after hours, contact your primary care physician or go the hospital emergency room.  Keep dressing clean and dry and cover while bathing. Only change dressing if over saturated, soiled or its falling off.

## 2019-10-16 NOTE — PROCEDURES
2% viscous lidocaine for ~5 minute dwell time. CSWD with curette to remove <1 cm2 non viable tissue from wound bed. Patient tolerated well.

## 2019-10-25 ENCOUNTER — NON-PROVIDER VISIT (OUTPATIENT)
Dept: WOUND CARE | Facility: MEDICAL CENTER | Age: 64
End: 2019-10-25
Attending: FAMILY MEDICINE
Payer: MEDICAID

## 2019-10-25 PROCEDURE — 99211 OFF/OP EST MAY X REQ PHY/QHP: CPT

## 2019-10-25 NOTE — PATIENT INSTRUCTIONS
-Keep dressings clean, dry and covered while bathing. Only change dressings if they become over saturated, soiled or fall off.     -Avoid prolonged standing or sitting without elevating your legs.    -Remove your compression garments if you have severe pain, severe swelling, numbness, color change, or temperature change in your toes. If you need to remove your compression garments, do so by unrolling them. Do not cut the compression garments off, this is to prevent cutting yourself on accident.    -Should you experience any significant changes in your wound(s), such as infection (redness, swelling, localized heat, increased pain, fever > 101 F, chills) or have any questions regarding your home care instructions, please contact the wound center at [phone]. If after hours, contact your primary care physician or go to the hospital emergency room.

## 2019-10-25 NOTE — PROCEDURES
10/25/2019: No wound procedures today.    Patient's wound is scabbed. Advised patient to return next week due to his long history of newly healed wounds reopening. Patient verbalized understanding and agrees with plan.

## 2019-10-30 ENCOUNTER — OFFICE VISIT (OUTPATIENT)
Dept: WOUND CARE | Facility: MEDICAL CENTER | Age: 64
End: 2019-10-30
Attending: NURSE PRACTITIONER
Payer: MEDICAID

## 2019-11-04 ENCOUNTER — OFFICE VISIT (OUTPATIENT)
Dept: WOUND CARE | Facility: MEDICAL CENTER | Age: 64
End: 2019-11-04
Attending: NURSE PRACTITIONER
Payer: MEDICAID

## 2019-11-04 VITALS
DIASTOLIC BLOOD PRESSURE: 55 MMHG | TEMPERATURE: 98 F | HEART RATE: 73 BPM | RESPIRATION RATE: 16 BRPM | SYSTOLIC BLOOD PRESSURE: 105 MMHG | OXYGEN SATURATION: 97 %

## 2019-11-04 DIAGNOSIS — L08.9 WOUND INFECTION: ICD-10-CM

## 2019-11-04 DIAGNOSIS — M21.962 ACQUIRED DEFORMITY OF LEFT ANKLE AND FOOT: ICD-10-CM

## 2019-11-04 DIAGNOSIS — T14.8XXA WOUND INFECTION: ICD-10-CM

## 2019-11-04 DIAGNOSIS — Z72.0 TOBACCO USE: ICD-10-CM

## 2019-11-04 DIAGNOSIS — L97.922 SKIN ULCER OF LEFT LOWER LEG WITH FAT LAYER EXPOSED (HCC): ICD-10-CM

## 2019-11-04 DIAGNOSIS — I87.8 CHRONIC VENOUS STASIS: ICD-10-CM

## 2019-11-04 PROCEDURE — 99213 OFFICE O/P EST LOW 20 MIN: CPT | Performed by: NURSE PRACTITIONER

## 2019-11-04 PROCEDURE — 99213 OFFICE O/P EST LOW 20 MIN: CPT

## 2019-11-04 ASSESSMENT — PAIN SCALES - GENERAL: PAINLEVEL: 2=MINIMAL-SLIGHT

## 2019-11-06 ASSESSMENT — ENCOUNTER SYMPTOMS
COUGH: 0
SHORTNESS OF BREATH: 0
DEPRESSION: 0
EYES NEGATIVE: 1
CONSTIPATION: 0
VOMITING: 0
WEAKNESS: 0
DIARRHEA: 0
FEVER: 0
BACK PAIN: 0
CHILLS: 0
FALLS: 0
WHEEZING: 0
NAUSEA: 0
NERVOUS/ANXIOUS: 0
PALPITATIONS: 0

## 2019-11-06 NOTE — PROGRESS NOTES
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 before finalizing the note.

## 2019-11-14 ENCOUNTER — APPOINTMENT (OUTPATIENT)
Dept: WOUND CARE | Facility: MEDICAL CENTER | Age: 64
End: 2019-11-14
Attending: NURSE PRACTITIONER
Payer: MEDICAID

## 2019-11-22 ENCOUNTER — APPOINTMENT (OUTPATIENT)
Dept: WOUND CARE | Facility: MEDICAL CENTER | Age: 64
End: 2019-11-22
Attending: NURSE PRACTITIONER
Payer: MEDICAID

## 2019-12-26 ENCOUNTER — APPOINTMENT (OUTPATIENT)
Dept: RADIOLOGY | Facility: MEDICAL CENTER | Age: 64
DRG: 300 | End: 2019-12-26
Attending: HOSPITALIST
Payer: MEDICAID

## 2019-12-26 ENCOUNTER — HOSPITAL ENCOUNTER (INPATIENT)
Facility: MEDICAL CENTER | Age: 64
LOS: 7 days | DRG: 300 | End: 2020-01-02
Attending: EMERGENCY MEDICINE | Admitting: HOSPITALIST
Payer: MEDICAID

## 2019-12-26 DIAGNOSIS — L97.929 ULCER OF LEFT LOWER EXTREMITY, UNSPECIFIED ULCER STAGE (HCC): ICD-10-CM

## 2019-12-26 DIAGNOSIS — I83.029 VENOUS STASIS ULCER OF LEFT LOWER EXTREMITY (HCC): ICD-10-CM

## 2019-12-26 DIAGNOSIS — I73.9 PVD (PERIPHERAL VASCULAR DISEASE) (HCC): ICD-10-CM

## 2019-12-26 DIAGNOSIS — R23.8 BULLAE: ICD-10-CM

## 2019-12-26 DIAGNOSIS — I87.2 VENOUS STASIS DERMATITIS OF BOTH LOWER EXTREMITIES: ICD-10-CM

## 2019-12-26 DIAGNOSIS — L97.929 VENOUS STASIS ULCER OF LEFT LOWER EXTREMITY (HCC): ICD-10-CM

## 2019-12-26 PROBLEM — L08.9: Status: ACTIVE | Noted: 2019-12-26

## 2019-12-26 PROBLEM — S80.822A: Status: ACTIVE | Noted: 2019-12-26

## 2019-12-26 PROBLEM — D64.9 ANEMIA: Status: ACTIVE | Noted: 2019-12-26

## 2019-12-26 LAB
ANION GAP SERPL CALC-SCNC: 8 MMOL/L (ref 0–11.9)
BASOPHILS # BLD AUTO: 0.4 % (ref 0–1.8)
BASOPHILS # BLD: 0.03 K/UL (ref 0–0.12)
BUN SERPL-MCNC: 40 MG/DL (ref 8–22)
CALCIUM SERPL-MCNC: 8.5 MG/DL (ref 8.5–10.5)
CHLORIDE SERPL-SCNC: 110 MMOL/L (ref 96–112)
CO2 SERPL-SCNC: 25 MMOL/L (ref 20–33)
CREAT SERPL-MCNC: 1.13 MG/DL (ref 0.5–1.4)
CRP SERPL HS-MCNC: 11.77 MG/DL (ref 0–0.75)
EOSINOPHIL # BLD AUTO: 0.21 K/UL (ref 0–0.51)
EOSINOPHIL NFR BLD: 3 % (ref 0–6.9)
ERYTHROCYTE [DISTWIDTH] IN BLOOD BY AUTOMATED COUNT: 52.8 FL (ref 35.9–50)
ERYTHROCYTE [SEDIMENTATION RATE] IN BLOOD BY WESTERGREN METHOD: 57 MM/HOUR (ref 0–20)
GLUCOSE SERPL-MCNC: 100 MG/DL (ref 65–99)
HCT VFR BLD AUTO: 38.5 % (ref 42–52)
HGB BLD-MCNC: 12.2 G/DL (ref 14–18)
IMM GRANULOCYTES # BLD AUTO: 0.03 K/UL (ref 0–0.11)
IMM GRANULOCYTES NFR BLD AUTO: 0.4 % (ref 0–0.9)
LYMPHOCYTES # BLD AUTO: 0.85 K/UL (ref 1–4.8)
LYMPHOCYTES NFR BLD: 12.2 % (ref 22–41)
MCH RBC QN AUTO: 31 PG (ref 27–33)
MCHC RBC AUTO-ENTMCNC: 31.7 G/DL (ref 33.7–35.3)
MCV RBC AUTO: 98 FL (ref 81.4–97.8)
MONOCYTES # BLD AUTO: 0.73 K/UL (ref 0–0.85)
MONOCYTES NFR BLD AUTO: 10.5 % (ref 0–13.4)
NEUTROPHILS # BLD AUTO: 5.12 K/UL (ref 1.82–7.42)
NEUTROPHILS NFR BLD: 73.5 % (ref 44–72)
NRBC # BLD AUTO: 0 K/UL
NRBC BLD-RTO: 0 /100 WBC
PLATELET # BLD AUTO: 288 K/UL (ref 164–446)
PMV BLD AUTO: 9.1 FL (ref 9–12.9)
POTASSIUM SERPL-SCNC: 4.4 MMOL/L (ref 3.6–5.5)
PROCALCITONIN SERPL-MCNC: <0.05 NG/ML
RBC # BLD AUTO: 3.93 M/UL (ref 4.7–6.1)
SODIUM SERPL-SCNC: 143 MMOL/L (ref 135–145)
WBC # BLD AUTO: 7 K/UL (ref 4.8–10.8)

## 2019-12-26 PROCEDURE — 770006 HCHG ROOM/CARE - MED/SURG/GYN SEMI*

## 2019-12-26 PROCEDURE — 85025 COMPLETE CBC W/AUTO DIFF WBC: CPT

## 2019-12-26 PROCEDURE — 86140 C-REACTIVE PROTEIN: CPT

## 2019-12-26 PROCEDURE — 87186 SC STD MICRODIL/AGAR DIL: CPT

## 2019-12-26 PROCEDURE — 73701 CT LOWER EXTREMITY W/DYE: CPT | Mod: LT

## 2019-12-26 PROCEDURE — 99223 1ST HOSP IP/OBS HIGH 75: CPT | Performed by: HOSPITALIST

## 2019-12-26 PROCEDURE — 700105 HCHG RX REV CODE 258: Performed by: HOSPITALIST

## 2019-12-26 PROCEDURE — 80048 BASIC METABOLIC PNL TOTAL CA: CPT

## 2019-12-26 PROCEDURE — 700117 HCHG RX CONTRAST REV CODE 255: Performed by: HOSPITALIST

## 2019-12-26 PROCEDURE — 96366 THER/PROPH/DIAG IV INF ADDON: CPT

## 2019-12-26 PROCEDURE — 87077 CULTURE AEROBIC IDENTIFY: CPT | Mod: 91

## 2019-12-26 PROCEDURE — 96365 THER/PROPH/DIAG IV INF INIT: CPT

## 2019-12-26 PROCEDURE — 85652 RBC SED RATE AUTOMATED: CPT

## 2019-12-26 PROCEDURE — 700111 HCHG RX REV CODE 636 W/ 250 OVERRIDE (IP): Performed by: HOSPITALIST

## 2019-12-26 PROCEDURE — 87205 SMEAR GRAM STAIN: CPT

## 2019-12-26 PROCEDURE — 87070 CULTURE OTHR SPECIMN AEROBIC: CPT

## 2019-12-26 PROCEDURE — 84145 PROCALCITONIN (PCT): CPT

## 2019-12-26 PROCEDURE — 96367 TX/PROPH/DG ADDL SEQ IV INF: CPT

## 2019-12-26 PROCEDURE — 87040 BLOOD CULTURE FOR BACTERIA: CPT

## 2019-12-26 PROCEDURE — 700102 HCHG RX REV CODE 250 W/ 637 OVERRIDE(OP): Performed by: HOSPITALIST

## 2019-12-26 PROCEDURE — 99285 EMERGENCY DEPT VISIT HI MDM: CPT

## 2019-12-26 PROCEDURE — 36415 COLL VENOUS BLD VENIPUNCTURE: CPT

## 2019-12-26 PROCEDURE — A9270 NON-COVERED ITEM OR SERVICE: HCPCS | Performed by: HOSPITALIST

## 2019-12-26 RX ORDER — OXYCODONE HYDROCHLORIDE 10 MG/1
10 TABLET ORAL
Status: DISCONTINUED | OUTPATIENT
Start: 2019-12-26 | End: 2019-12-27

## 2019-12-26 RX ORDER — PROPRANOLOL HYDROCHLORIDE 10 MG/1
10 TABLET ORAL 2 TIMES DAILY
Status: DISCONTINUED | OUTPATIENT
Start: 2019-12-27 | End: 2020-01-01

## 2019-12-26 RX ORDER — AMLODIPINE BESYLATE 10 MG/1
10 TABLET ORAL DAILY
Status: DISCONTINUED | OUTPATIENT
Start: 2019-12-27 | End: 2020-01-02 | Stop reason: HOSPADM

## 2019-12-26 RX ORDER — SODIUM CHLORIDE 9 MG/ML
INJECTION, SOLUTION INTRAVENOUS CONTINUOUS
Status: DISCONTINUED | OUTPATIENT
Start: 2019-12-26 | End: 2019-12-27

## 2019-12-26 RX ORDER — CITALOPRAM 20 MG/1
20 TABLET ORAL DAILY
COMMUNITY
End: 2020-02-25

## 2019-12-26 RX ORDER — POLYETHYLENE GLYCOL 3350 17 G/17G
1 POWDER, FOR SOLUTION ORAL
Status: DISCONTINUED | OUTPATIENT
Start: 2019-12-26 | End: 2019-12-27

## 2019-12-26 RX ORDER — AMOXICILLIN 250 MG
2 CAPSULE ORAL 2 TIMES DAILY
Status: DISCONTINUED | OUTPATIENT
Start: 2019-12-26 | End: 2019-12-27

## 2019-12-26 RX ORDER — OXYCODONE HYDROCHLORIDE 5 MG/1
5 TABLET ORAL
Status: DISCONTINUED | OUTPATIENT
Start: 2019-12-26 | End: 2019-12-27

## 2019-12-26 RX ORDER — PROCHLORPERAZINE EDISYLATE 5 MG/ML
5-10 INJECTION INTRAMUSCULAR; INTRAVENOUS EVERY 4 HOURS PRN
Status: DISCONTINUED | OUTPATIENT
Start: 2019-12-26 | End: 2019-12-27

## 2019-12-26 RX ORDER — HEPARIN SODIUM 5000 [USP'U]/ML
5000 INJECTION, SOLUTION INTRAVENOUS; SUBCUTANEOUS EVERY 8 HOURS
Status: DISCONTINUED | OUTPATIENT
Start: 2019-12-26 | End: 2020-01-02 | Stop reason: HOSPADM

## 2019-12-26 RX ORDER — AMLODIPINE BESYLATE 10 MG/1
10 TABLET ORAL DAILY
COMMUNITY
End: 2020-02-19

## 2019-12-26 RX ORDER — PROMETHAZINE HYDROCHLORIDE 25 MG/1
12.5-25 TABLET ORAL EVERY 4 HOURS PRN
Status: DISCONTINUED | OUTPATIENT
Start: 2019-12-26 | End: 2019-12-27

## 2019-12-26 RX ORDER — CITALOPRAM 20 MG/1
20 TABLET ORAL DAILY
Status: DISCONTINUED | OUTPATIENT
Start: 2019-12-27 | End: 2020-01-02 | Stop reason: HOSPADM

## 2019-12-26 RX ORDER — ATORVASTATIN CALCIUM 40 MG/1
40 TABLET, FILM COATED ORAL NIGHTLY
COMMUNITY
End: 2020-01-13 | Stop reason: SDUPTHER

## 2019-12-26 RX ORDER — ATORVASTATIN CALCIUM 40 MG/1
40 TABLET, FILM COATED ORAL
Status: DISCONTINUED | OUTPATIENT
Start: 2019-12-27 | End: 2020-01-02 | Stop reason: HOSPADM

## 2019-12-26 RX ORDER — PROMETHAZINE HYDROCHLORIDE 12.5 MG/1
12.5-25 SUPPOSITORY RECTAL EVERY 4 HOURS PRN
Status: DISCONTINUED | OUTPATIENT
Start: 2019-12-26 | End: 2019-12-27

## 2019-12-26 RX ORDER — ONDANSETRON 4 MG/1
4 TABLET, ORALLY DISINTEGRATING ORAL EVERY 4 HOURS PRN
Status: DISCONTINUED | OUTPATIENT
Start: 2019-12-26 | End: 2020-01-02 | Stop reason: HOSPADM

## 2019-12-26 RX ORDER — PROPRANOLOL HYDROCHLORIDE 10 MG/1
10 TABLET ORAL 2 TIMES DAILY
Status: ON HOLD | COMMUNITY
End: 2020-01-02

## 2019-12-26 RX ORDER — ONDANSETRON 2 MG/ML
4 INJECTION INTRAMUSCULAR; INTRAVENOUS EVERY 4 HOURS PRN
Status: DISCONTINUED | OUTPATIENT
Start: 2019-12-26 | End: 2020-01-02 | Stop reason: HOSPADM

## 2019-12-26 RX ORDER — BISACODYL 10 MG
10 SUPPOSITORY, RECTAL RECTAL
Status: DISCONTINUED | OUTPATIENT
Start: 2019-12-26 | End: 2019-12-27

## 2019-12-26 RX ORDER — MORPHINE SULFATE 4 MG/ML
4 INJECTION, SOLUTION INTRAMUSCULAR; INTRAVENOUS
Status: DISCONTINUED | OUTPATIENT
Start: 2019-12-26 | End: 2019-12-27

## 2019-12-26 RX ADMIN — IOHEXOL 100 ML: 350 INJECTION, SOLUTION INTRAVENOUS at 22:55

## 2019-12-26 RX ADMIN — VANCOMYCIN HYDROCHLORIDE 2100 MG: 500 INJECTION, POWDER, LYOPHILIZED, FOR SOLUTION INTRAVENOUS at 22:33

## 2019-12-26 RX ADMIN — AMPICILLIN SODIUM AND SULBACTAM SODIUM 3 G: 2; 1 INJECTION, POWDER, FOR SOLUTION INTRAMUSCULAR; INTRAVENOUS at 21:46

## 2019-12-26 RX ADMIN — OXYCODONE HYDROCHLORIDE 5 MG: 5 TABLET ORAL at 21:46

## 2019-12-26 RX ADMIN — SENNOSIDES AND DOCUSATE SODIUM 2 TABLET: 8.6; 5 TABLET ORAL at 22:33

## 2019-12-26 ASSESSMENT — ENCOUNTER SYMPTOMS
HALLUCINATIONS: 0
FOCAL WEAKNESS: 0
FLANK PAIN: 0
DEPRESSION: 0
ABDOMINAL PAIN: 0
WEAKNESS: 0
HEMOPTYSIS: 0
VOMITING: 0
HEARTBURN: 0
CHILLS: 0
SHORTNESS OF BREATH: 0
SPEECH CHANGE: 0
DIZZINESS: 0
FEVER: 0
BRUISES/BLEEDS EASILY: 0
NAUSEA: 0
PALPITATIONS: 0
MYALGIAS: 0
DOUBLE VISION: 0
BLURRED VISION: 0
SENSORY CHANGE: 0
EYE DISCHARGE: 0
COUGH: 0

## 2019-12-26 ASSESSMENT — LIFESTYLE VARIABLES: SUBSTANCE_ABUSE: 0

## 2019-12-27 ENCOUNTER — PATIENT OUTREACH (OUTPATIENT)
Dept: HEALTH INFORMATION MANAGEMENT | Facility: OTHER | Age: 64
End: 2019-12-27

## 2019-12-27 ENCOUNTER — APPOINTMENT (OUTPATIENT)
Dept: RADIOLOGY | Facility: MEDICAL CENTER | Age: 64
DRG: 300 | End: 2019-12-27
Attending: INTERNAL MEDICINE
Payer: MEDICAID

## 2019-12-27 PROBLEM — D64.9 ANEMIA: Status: RESOLVED | Noted: 2019-12-26 | Resolved: 2019-12-27

## 2019-12-27 PROBLEM — I87.2 VENOUS STASIS DERMATITIS OF BOTH LOWER EXTREMITIES: Status: ACTIVE | Noted: 2019-12-26

## 2019-12-27 PROBLEM — N18.30 STAGE 3 CHRONIC KIDNEY DISEASE: Status: RESOLVED | Noted: 2018-03-07 | Resolved: 2019-12-27

## 2019-12-27 PROBLEM — E78.49 OTHER HYPERLIPIDEMIA: Status: ACTIVE | Noted: 2018-12-19

## 2019-12-27 PROBLEM — I70.202 ATHEROSCLEROSIS OF NATIVE ARTERY OF LEFT LOWER EXTREMITY (HCC): Status: RESOLVED | Noted: 2017-11-09 | Resolved: 2019-12-27

## 2019-12-27 LAB
ALBUMIN SERPL BCP-MCNC: 3.4 G/DL (ref 3.2–4.9)
ALBUMIN/GLOB SERPL: 1.1 G/DL
ALP SERPL-CCNC: 50 U/L (ref 30–99)
ALT SERPL-CCNC: 14 U/L (ref 2–50)
ANION GAP SERPL CALC-SCNC: 7 MMOL/L (ref 0–11.9)
AST SERPL-CCNC: 18 U/L (ref 12–45)
BASOPHILS # BLD AUTO: 0.4 % (ref 0–1.8)
BASOPHILS # BLD: 0.03 K/UL (ref 0–0.12)
BILIRUB SERPL-MCNC: 0.3 MG/DL (ref 0.1–1.5)
BUN SERPL-MCNC: 31 MG/DL (ref 8–22)
CALCIUM SERPL-MCNC: 8.3 MG/DL (ref 8.5–10.5)
CHLORIDE SERPL-SCNC: 111 MMOL/L (ref 96–112)
CO2 SERPL-SCNC: 23 MMOL/L (ref 20–33)
CREAT SERPL-MCNC: 0.91 MG/DL (ref 0.5–1.4)
EOSINOPHIL # BLD AUTO: 0.32 K/UL (ref 0–0.51)
EOSINOPHIL NFR BLD: 4.7 % (ref 0–6.9)
ERYTHROCYTE [DISTWIDTH] IN BLOOD BY AUTOMATED COUNT: 54 FL (ref 35.9–50)
GLOBULIN SER CALC-MCNC: 3.2 G/DL (ref 1.9–3.5)
GLUCOSE SERPL-MCNC: 97 MG/DL (ref 65–99)
GRAM STN SPEC: NORMAL
HCT VFR BLD AUTO: 38.4 % (ref 42–52)
HGB BLD-MCNC: 11.9 G/DL (ref 14–18)
IMM GRANULOCYTES # BLD AUTO: 0.02 K/UL (ref 0–0.11)
IMM GRANULOCYTES NFR BLD AUTO: 0.3 % (ref 0–0.9)
LYMPHOCYTES # BLD AUTO: 1.18 K/UL (ref 1–4.8)
LYMPHOCYTES NFR BLD: 17.4 % (ref 22–41)
MCH RBC QN AUTO: 30.8 PG (ref 27–33)
MCHC RBC AUTO-ENTMCNC: 31 G/DL (ref 33.7–35.3)
MCV RBC AUTO: 99.5 FL (ref 81.4–97.8)
MONOCYTES # BLD AUTO: 0.8 K/UL (ref 0–0.85)
MONOCYTES NFR BLD AUTO: 11.8 % (ref 0–13.4)
NEUTROPHILS # BLD AUTO: 4.44 K/UL (ref 1.82–7.42)
NEUTROPHILS NFR BLD: 65.4 % (ref 44–72)
NRBC # BLD AUTO: 0 K/UL
NRBC BLD-RTO: 0 /100 WBC
PLATELET # BLD AUTO: 253 K/UL (ref 164–446)
PMV BLD AUTO: 9.1 FL (ref 9–12.9)
POTASSIUM SERPL-SCNC: 4.3 MMOL/L (ref 3.6–5.5)
PROT SERPL-MCNC: 6.6 G/DL (ref 6–8.2)
RBC # BLD AUTO: 3.86 M/UL (ref 4.7–6.1)
SIGNIFICANT IND 70042: NORMAL
SITE SITE: NORMAL
SODIUM SERPL-SCNC: 141 MMOL/L (ref 135–145)
SOURCE SOURCE: NORMAL
WBC # BLD AUTO: 6.8 K/UL (ref 4.8–10.8)

## 2019-12-27 PROCEDURE — 93922 UPR/L XTREMITY ART 2 LEVELS: CPT

## 2019-12-27 PROCEDURE — 36415 COLL VENOUS BLD VENIPUNCTURE: CPT

## 2019-12-27 PROCEDURE — 700105 HCHG RX REV CODE 258: Performed by: HOSPITALIST

## 2019-12-27 PROCEDURE — 770006 HCHG ROOM/CARE - MED/SURG/GYN SEMI*

## 2019-12-27 PROCEDURE — 700102 HCHG RX REV CODE 250 W/ 637 OVERRIDE(OP): Performed by: INTERNAL MEDICINE

## 2019-12-27 PROCEDURE — 700111 HCHG RX REV CODE 636 W/ 250 OVERRIDE (IP): Performed by: HOSPITALIST

## 2019-12-27 PROCEDURE — A9270 NON-COVERED ITEM OR SERVICE: HCPCS | Performed by: INTERNAL MEDICINE

## 2019-12-27 PROCEDURE — 700102 HCHG RX REV CODE 250 W/ 637 OVERRIDE(OP): Performed by: HOSPITALIST

## 2019-12-27 PROCEDURE — 80053 COMPREHEN METABOLIC PANEL: CPT

## 2019-12-27 PROCEDURE — 85025 COMPLETE CBC W/AUTO DIFF WBC: CPT

## 2019-12-27 PROCEDURE — 96372 THER/PROPH/DIAG INJ SC/IM: CPT

## 2019-12-27 PROCEDURE — 99233 SBSQ HOSP IP/OBS HIGH 50: CPT | Performed by: INTERNAL MEDICINE

## 2019-12-27 PROCEDURE — A9270 NON-COVERED ITEM OR SERVICE: HCPCS | Performed by: HOSPITALIST

## 2019-12-27 RX ORDER — TRAMADOL HYDROCHLORIDE 50 MG/1
50 TABLET ORAL EVERY 4 HOURS PRN
Status: DISCONTINUED | OUTPATIENT
Start: 2019-12-27 | End: 2019-12-29

## 2019-12-27 RX ORDER — POLYETHYLENE GLYCOL 3350 17 G/17G
1 POWDER, FOR SOLUTION ORAL 2 TIMES DAILY
Status: DISCONTINUED | OUTPATIENT
Start: 2019-12-27 | End: 2020-01-02 | Stop reason: HOSPADM

## 2019-12-27 RX ADMIN — OXYCODONE HYDROCHLORIDE 5 MG: 5 TABLET ORAL at 04:38

## 2019-12-27 RX ADMIN — AMPICILLIN SODIUM AND SULBACTAM SODIUM 3 G: 2; 1 INJECTION, POWDER, FOR SOLUTION INTRAMUSCULAR; INTRAVENOUS at 21:00

## 2019-12-27 RX ADMIN — AMPICILLIN SODIUM AND SULBACTAM SODIUM 3 G: 2; 1 INJECTION, POWDER, FOR SOLUTION INTRAMUSCULAR; INTRAVENOUS at 09:42

## 2019-12-27 RX ADMIN — HEPARIN SODIUM 5000 UNITS: 5000 INJECTION, SOLUTION INTRAVENOUS; SUBCUTANEOUS at 14:54

## 2019-12-27 RX ADMIN — VANCOMYCIN HYDROCHLORIDE 1200 MG: 500 INJECTION, POWDER, LYOPHILIZED, FOR SOLUTION INTRAVENOUS at 22:30

## 2019-12-27 RX ADMIN — VANCOMYCIN HYDROCHLORIDE 1200 MG: 500 INJECTION, POWDER, LYOPHILIZED, FOR SOLUTION INTRAVENOUS at 10:57

## 2019-12-27 RX ADMIN — HEPARIN SODIUM 5000 UNITS: 5000 INJECTION, SOLUTION INTRAVENOUS; SUBCUTANEOUS at 00:30

## 2019-12-27 RX ADMIN — OXYCODONE HYDROCHLORIDE 5 MG: 5 TABLET ORAL at 09:41

## 2019-12-27 RX ADMIN — AMLODIPINE BESYLATE 10 MG: 10 TABLET ORAL at 04:39

## 2019-12-27 RX ADMIN — SODIUM CHLORIDE: 9 INJECTION, SOLUTION INTRAVENOUS at 12:39

## 2019-12-27 RX ADMIN — CITALOPRAM HYDROBROMIDE 20 MG: 20 TABLET ORAL at 04:39

## 2019-12-27 RX ADMIN — PROPRANOLOL HYDROCHLORIDE 10 MG: 10 TABLET ORAL at 04:39

## 2019-12-27 RX ADMIN — TRAMADOL HYDROCHLORIDE 50 MG: 50 TABLET, FILM COATED ORAL at 14:55

## 2019-12-27 RX ADMIN — ATORVASTATIN CALCIUM 40 MG: 40 TABLET, FILM COATED ORAL at 20:51

## 2019-12-27 RX ADMIN — PROPRANOLOL HYDROCHLORIDE 10 MG: 10 TABLET ORAL at 17:45

## 2019-12-27 RX ADMIN — TRAMADOL HYDROCHLORIDE 50 MG: 50 TABLET, FILM COATED ORAL at 20:51

## 2019-12-27 RX ADMIN — AMPICILLIN SODIUM AND SULBACTAM SODIUM 3 G: 2; 1 INJECTION, POWDER, FOR SOLUTION INTRAMUSCULAR; INTRAVENOUS at 02:47

## 2019-12-27 RX ADMIN — OXYCODONE HYDROCHLORIDE 5 MG: 5 TABLET ORAL at 01:05

## 2019-12-27 RX ADMIN — POLYETHYLENE GLYCOL 3350 1 PACKET: 17 POWDER, FOR SOLUTION ORAL at 17:45

## 2019-12-27 RX ADMIN — HEPARIN SODIUM 5000 UNITS: 5000 INJECTION, SOLUTION INTRAVENOUS; SUBCUTANEOUS at 22:21

## 2019-12-27 RX ADMIN — AMPICILLIN SODIUM AND SULBACTAM SODIUM 3 G: 2; 1 INJECTION, POWDER, FOR SOLUTION INTRAMUSCULAR; INTRAVENOUS at 14:55

## 2019-12-27 RX ADMIN — SODIUM CHLORIDE: 9 INJECTION, SOLUTION INTRAVENOUS at 02:46

## 2019-12-27 SDOH — ECONOMIC STABILITY: FOOD INSECURITY: WITHIN THE PAST 12 MONTHS, YOU WORRIED THAT YOUR FOOD WOULD RUN OUT BEFORE YOU GOT MONEY TO BUY MORE.: NEVER TRUE

## 2019-12-27 SDOH — ECONOMIC STABILITY: TRANSPORTATION INSECURITY
IN THE PAST 12 MONTHS, HAS LACK OF TRANSPORTATION KEPT YOU FROM MEETINGS, WORK, OR FROM GETTING THINGS NEEDED FOR DAILY LIVING?: NO

## 2019-12-27 SDOH — ECONOMIC STABILITY: TRANSPORTATION INSECURITY
IN THE PAST 12 MONTHS, HAS THE LACK OF TRANSPORTATION KEPT YOU FROM MEDICAL APPOINTMENTS OR FROM GETTING MEDICATIONS?: NO

## 2019-12-27 SDOH — ECONOMIC STABILITY: FOOD INSECURITY: WITHIN THE PAST 12 MONTHS, THE FOOD YOU BOUGHT JUST DIDN'T LAST AND YOU DIDN'T HAVE MONEY TO GET MORE.: NEVER TRUE

## 2019-12-27 SDOH — ECONOMIC STABILITY: INCOME INSECURITY: HOW HARD IS IT FOR YOU TO PAY FOR THE VERY BASICS LIKE FOOD, HOUSING, MEDICAL CARE, AND HEATING?: NOT HARD AT ALL

## 2019-12-27 ASSESSMENT — ENCOUNTER SYMPTOMS
TINGLING: 0
NAUSEA: 0
WEAKNESS: 0
WHEEZING: 0
CONSTIPATION: 0
ABDOMINAL PAIN: 0
TREMORS: 0
HEADACHES: 0
BLOOD IN STOOL: 0
FALLS: 0
SEIZURES: 0
BACK PAIN: 0
LOSS OF CONSCIOUSNESS: 0
VOMITING: 0
SENSORY CHANGE: 0
WEIGHT LOSS: 0
MYALGIAS: 0
PALPITATIONS: 0
SPUTUM PRODUCTION: 0
NECK PAIN: 0
SPEECH CHANGE: 0
CHILLS: 0
BLURRED VISION: 0
DIARRHEA: 0
PND: 0
DIAPHORESIS: 0
SHORTNESS OF BREATH: 0
COUGH: 0
HEARTBURN: 0
DIZZINESS: 0
DEPRESSION: 0
FEVER: 0
HEMOPTYSIS: 0
FLANK PAIN: 0
DOUBLE VISION: 0
FOCAL WEAKNESS: 0

## 2019-12-27 ASSESSMENT — COGNITIVE AND FUNCTIONAL STATUS - GENERAL
DAILY ACTIVITIY SCORE: 21
TOILETING: A LITTLE
CLIMB 3 TO 5 STEPS WITH RAILING: A LOT
SUGGESTED CMS G CODE MODIFIER DAILY ACTIVITY: CJ
WALKING IN HOSPITAL ROOM: A LITTLE
DRESSING REGULAR LOWER BODY CLOTHING: A LITTLE
MOBILITY SCORE: 18
MOVING FROM LYING ON BACK TO SITTING ON SIDE OF FLAT BED: A LITTLE
SUGGESTED CMS G CODE MODIFIER MOBILITY: CK
STANDING UP FROM CHAIR USING ARMS: A LITTLE
HELP NEEDED FOR BATHING: A LITTLE
MOVING TO AND FROM BED TO CHAIR: A LITTLE

## 2019-12-27 ASSESSMENT — LIFESTYLE VARIABLES
HOW MANY TIMES IN THE PAST YEAR HAVE YOU HAD 5 OR MORE DRINKS IN A DAY: 26
CONSUMPTION TOTAL: POSITIVE
AVERAGE NUMBER OF DAYS PER WEEK YOU HAVE A DRINK CONTAINING ALCOHOL: 2
ALCOHOL_USE: YES
HAVE YOU EVER FELT YOU SHOULD CUT DOWN ON YOUR DRINKING: NO
TOTAL SCORE: 0
EVER FELT BAD OR GUILTY ABOUT YOUR DRINKING: NO
SUBSTANCE_ABUSE: 0
ON A TYPICAL DAY WHEN YOU DRINK ALCOHOL HOW MANY DRINKS DO YOU HAVE: 12
TOTAL SCORE: 0
HAVE PEOPLE ANNOYED YOU BY CRITICIZING YOUR DRINKING: NO
DOES PATIENT WANT TO STOP DRINKING: NO
TOTAL SCORE: 0
EVER HAD A DRINK FIRST THING IN THE MORNING TO STEADY YOUR NERVES TO GET RID OF A HANGOVER: NO

## 2019-12-27 NOTE — ASSESSMENT & PLAN NOTE
With superimposed cellulitis of bilateral lower extremities, more profound in the left lower extremity  Left lower extremity with bullous formation, blisters secondary to underlying infection  Arterial evaluation negative    Wound cultures obtained from December 26, 2019 are growing polymicrobial organisms including Serratia, Pseudomonas and Streptococcus.  Serratia is sensitive to Bactrim, Pseudomonas is sensitive to Cipro.    I have personally reviewed the culture data    Continue IV Zosyn to stop today  Po bactrim to po doxycycline due to worsening renal function    Po cipro    Limb preservation service, wound team evaluations to continue     Pain control with oxycodone

## 2019-12-27 NOTE — CONSULTS
"Diabetes education: Order received for diabetes education for \"[facility]\". Pt does not have a diabetes diagnosis listed, Hg a1c last done in 2018 was 5.5%. admission blood sugar was 100 ( 9 7 today).   Plan: pt is not on any DM medications or finger sticks, has no dx of diabetes and does not meet criteria. Not appropriate for DM education/referral. If needs change please  Reorder.  "

## 2019-12-27 NOTE — ASSESSMENT & PLAN NOTE
Likely anemia of chronic disease, component of minimal iron deficiency anemia  B12 lower limit of normal  Vitamin C, multivitamin, B12, iron supplementation initiated  Monitor Hb / Restrictive transfusion strategy

## 2019-12-27 NOTE — WOUND TEAM
[facility]  Inpatient Services  Initial Wound and Skin Care Evaluation    Admission Date:  12/26/2019   HPI, PMH, SH: Reviewed Pt presented to ED for right leg swelling and blisters.    Hx: CHF, leg wound care, non diabetic Charcot  Unit where seen by Wound Team: [id number]    WOUND CONSULT RELATED TO:  Left leg wounds    SUBJECTIVE:  Pt says he has worn compression in the past and was wrapped in unna boots at the OP wound clinic.  States he developed left leg blisters a few weeks ago      Self Report / Pain Level:  No report of pain     OBJECTIVE:  Pt in bed, able to move legs on his own.  Has a pad under leg, has a blue dressing on medial leg that he states he obtained from [employer] a medical supply company    WOUND TYPE, LOCATION, CHARACTERISTICS (Pressure ulcers: location, stage, POA or date identified)                 Wound Left Posterior LE distally (Active)   Site Assessment Dry;Red 12/28/2019  7:50 AM       Wound 12/27/19 Other (comment) Pretibial Multiple blisters throughout LLE (Active)   Wound Image      12/27/2019  1:10 AM   Site Assessment Red 12/28/2019  5:00 PM   Marylou-wound Assessment Yellow-brown;Edema 12/28/2019  5:00 PM   Margins Undefined edges 12/28/2019  5:00 PM   Tunneling 0 cm 12/28/2019  5:00 PM   Undermining 0 cm 12/28/2019  5:00 PM   Closure Secondary intention 12/28/2019  5:00 PM   Drainage Amount Moderate 12/28/2019  5:00 PM   Drainage Description Serous 12/28/2019  5:00 PM   Non-staged Wound Description Partial thickness 12/28/2019  5:00 PM   Treatments Site care;Cleansed 12/28/2019  5:00 PM   Cleansing Approved Wound Cleanser 12/28/2019  5:00 PM   Dressing Options Viscopaste;Absorbent Abdominal Pad;Roll Gauze 12/28/2019  5:00 PM   Dressing Changed Changed 12/28/2019  5:00 PM   Dressing Change Frequency Every 48 hrs 12/28/2019  5:00 PM   NEXT Dressing Change  12/30/19 12/28/2019  5:00 PM   NEXT Weekly Photo (Inpatient Only) 01/04/20 12/28/2019  5:00 PM   WOUND NURSE ONLY - Odor  None 12/28/2019  5:00 PM   WOUND NURSE ONLY - Exposed Structures None 12/28/2019  5:00 PM   WOUND NURSE ONLY - Tissue Type and Percentage red 12/28/2019  5:00 PM   WOUND NURSE ONLY - Time Spent with Patient (mins) 60 12/28/2019  5:00 PM        Vascular:   10/17/18 Findings   Bilateral   There is no evidence of major arterial disease demonstrated bilaterally.   Doppler waveforms of the common femoral, popliteal, posterior tibial, and    dorsalis pedis arteries are of high amplitude and triphasic.       Lab Values:    WBC:       Lab Results   Component Value Date/Time    WBC 6.8 12/27/2019 02:43 AM    RBC 3.86 (L) 12/27/2019 02:43 AM     AIC:      Lab Results   Component Value Date/Time    HBA1C 5.5 12/22/2018 12:36 AM      12/26 CT scan leg IMPRESSION:     Moderate to severe distal leg and medial ankle bullous change likely is infected.     Diffuse subcutaneous fat stranding and skin thickening could indicate bland edema or cellulitis and phlegmonous change.     No soft tissue gas or bony destruction is seen to suggest deep infection     Culture: 12/26/19 prelim negative         INTERVENTIONS BY WOUND TEAM:  Dressings removed, legs cleansed with 4 in 1 cleanser, wounds cleansed with wound cleanser, viscopaste wrapped on leg followed by ABD and rolled gauze.  Will increase compression when s&s of infection decrease.      Dressing selection:  Viscopaste, 1 rolled gauze, 2 ABD        Interdisciplinary consultation:  RN, patient      EVALUATION: Pt with leg blisters from fluid overload and infection, complicated by CVI.  Blisters appear to be resolving compared to previous pictures. Will encourage epithelialization with viscopaste  Factors affecting wound healing:  CHF, PVD, Hep C, poly substance abuse, smoker, CVI  Goals:  Steady decrease in wound area and depth weekly     NURSING PLAN OF CARE ORDERS (X):    Dressing changes: See Dressing Care orders:   X  Skin care: See Skin Care orders:   Rectal tube care: See Rectal  Tube Care orders:   Other orders:    RSKIN: CURRENT (X) ORDERED (O)  Q shift Vahe:  X  Q shift pressure point assessments:  X  Pressure redistribution mattress        Waffle overlay  LUBA      Bariatric LUBA      Bariatric foam        Heel float boots       Heels floated on pillows    X  Barrier wipes      Barrier Cream      Barrier paste      Sacral silicone dressing      Silicone O2 tubing      Anchorfast      Trach with Optifoam split foam       Waffle cushion      Rectal tube or BMS      Antifungal tx    Turn q 2 hours   X  Up to chair   X  Ambulate X  PT/OT     Dietician      PO     TF   TPN   NPO   # days   Other       WOUND TEAM PLAN OF CARE (X):   NPWT change 3 x week:        Dressing changes by wound team:   X    Follow up as needed:       Other (explain):      Anticipated discharge plans (X):  SNF:           Home Care: vs OP        X pt may need ongoing wound care for L  Outpatient Wound Center:            Self Care:            Other:

## 2019-12-27 NOTE — PROGRESS NOTES
[facility] Medicine Daily Progress Note    Date of Service  12/27/2019    Chief Complaint  64 y.o. male admitted 12/26/2019 with Leg pain / Blister     Hospital Course    Patient presented to the hospital with complaints of leg pain, blisters, worsening erythema.  Reports discoloration of the skin of the legs for the last 3 years.  Has previous known history of cellulitis of his lower extremities.      Interval Problem Update  Patient seen and evaluated on rounds  Discussed with nursing staff on rounds  Persistent erythema, swelling of lower extremities  More profound in the left lower extremity  Blisters, bullous formation of the left lower extremity  Intermittent pain reported by the patient  No fever or chills  No other issues/complaints status    Consultants/Specialty  None    Code Status  Full code    Disposition  Anticipate home once acute issues are resolved    Review of Systems  Review of Systems   Constitutional: Positive for malaise/fatigue. Negative for chills, diaphoresis, fever and weight loss.   HENT: Negative for hearing loss and tinnitus.    Eyes: Negative for blurred vision and double vision.   Respiratory: Negative for cough, hemoptysis, sputum production, shortness of breath and wheezing.    Cardiovascular: Positive for leg swelling. Negative for chest pain, palpitations and PND.   Gastrointestinal: Negative for abdominal pain, blood in stool, constipation, diarrhea, heartburn, melena, nausea and vomiting.   Genitourinary: Negative for dysuria, flank pain, frequency, hematuria and urgency.   Musculoskeletal: Negative for back pain, falls, joint pain, myalgias and neck pain.   Skin: Positive for rash. Negative for itching.        Leg cellulitis, bullae/blister formation   Neurological: Negative for dizziness, tingling, tremors, sensory change, speech change, focal weakness, seizures, loss of consciousness, weakness and headaches.   Psychiatric/Behavioral: Negative for depression, substance abuse and  suicidal ideas.        Physical Exam  Temp:  [36.2 °C (97.2 °F)-37.4 °C (99.4 °F)] 37.4 °C (99.4 °F)  Pulse:  [63-79] 67  Resp:  [16-18] 16  BP: (128-144)/(80-92) 135/91  SpO2:  [90 %-98 %] 92 %    Physical Exam  HENT:      Head: Normocephalic.      Right Ear: External ear normal.      Left Ear: External ear normal.      Nose: Nose normal.      Mouth/Throat:      Mouth: Mucous membranes are moist.   Eyes:      General: No scleral icterus.     Conjunctiva/sclera: Conjunctivae normal.      Pupils: Pupils are equal, round, and reactive to light.   Neck:      Musculoskeletal: Normal range of motion and neck supple.   Cardiovascular:      Rate and Rhythm: Normal rate and regular rhythm.      Pulses: Normal pulses.      Heart sounds: Murmur present. No friction rub. No gallop.       Comments: Aortic area systolic murmur, apical systolic murmur  Pulmonary:      Effort: Pulmonary effort is normal. No respiratory distress.      Breath sounds: Normal breath sounds. No stridor. No wheezing or rhonchi.   Abdominal:      General: Abdomen is flat. Bowel sounds are normal. There is no distension.      Palpations: There is no mass.      Tenderness: There is no tenderness. There is no right CVA tenderness, left CVA tenderness, guarding or rebound.      Hernia: No hernia is present.   Musculoskeletal: Normal range of motion.      Right lower leg: Edema present.      Left lower leg: Edema present.   Skin:     General: Skin is warm and dry.      Capillary Refill: Capillary refill takes 2 to 3 seconds.      Coloration: Skin is not jaundiced.      Findings: Erythema and rash present.      Comments: Patient has bilateral lower extremity venous stasis dermatitis with bilateral lower extremity superimposed cellulitis, more profound in the left lower extremity.  Blister, bullous formation   Neurological:      General: No focal deficit present.      Mental Status: He is alert and oriented to person, place, and time. Mental status is at  baseline.   Psychiatric:         Mood and Affect: Mood normal.         Behavior: Behavior normal.         Thought Content: Thought content normal.         Judgment: Judgment normal.         Fluids    Intake/Output Summary (Last 24 hours) at 12/27/2019 1334  Last data filed at 12/27/2019 1000  Gross per 24 hour   Intake 240 ml   Output 1550 ml   Net -1310 ml       Laboratory  Recent Labs     12/26/19 2025 12/27/19  0243   WBC 7.0 6.8   RBC 3.93* 3.86*   HEMOGLOBIN 12.2* 11.9*   HEMATOCRIT 38.5* 38.4*   MCV 98.0* 99.5*   MCH 31.0 30.8   MCHC 31.7* 31.0*   RDW 52.8* 54.0*   PLATELETCT 288 253   MPV 9.1 9.1     Recent Labs     12/26/19 2025 12/27/19  0243   SODIUM 143 141   POTASSIUM 4.4 4.3   CHLORIDE 110 111   CO2 25 23   GLUCOSE 100* 97   BUN 40* 31*   CREATININE 1.13 0.91   CALCIUM 8.5 8.3*                   Imaging  CT-EXTREMITY, LOWER WITH LEFT   Final Result      Moderate to severe distal leg and medial ankle bullous change likely is infected.      Diffuse subcutaneous fat stranding and skin thickening could indicate bland edema or cellulitis and phlegmonous change.      No soft tissue gas or bony destruction is seen to suggest deep infection      CT-EXTREMITY, LOWER WITH RIGHT   Final Result      Diffuse subcutaneous fat stranding and skin thickening could indicate bland edema or cellulitis and phlegmonous change.      No abscess, soft tissue gas or bony destruction is seen to suggest infection      Healed tibia and fibula fractures with antegrade intramedullary nail in place      EC-ECHOCARDIOGRAM COMPLETE W/O CONT    (Results Pending)   US-EXTREMITY ARTERY LOWER BILAT W/BHUPINDER (COMBO)    (Results Pending)        Assessment/Plan  * Venous stasis dermatitis of both lower extremities- (present on admission)  Assessment & Plan  With superimposed cellulitis of bilateral lower extremities, more profound in the left lower extremity  Left lower extremity with bullous formation, blisters secondary to underlying  infection    At this time continue intravenous vancomycin  We will be monitoring vancomycin toxicity and therapeutics  Continue gentle IV fluid hydration oral vancomycin to limit vancomycin induced renal toxicity    Continue IV Unasyn    De-escalate antibiotics over the next 24 to 48 hours    Arterial duplex requested to r/o PAD     Awaiting limb preservation service, wound team consultation    Obtain echocardiogram    Pain control with tramadol    Other hyperlipidemia- (present on admission)  Assessment & Plan  Continue atorvastatin     Macrocytic anemia- (present on admission)  Assessment & Plan  We will check iron studies, B12, folate, reticulocyte count and TSH  Monitor Hb / Restrictive transfusion strategy    Essential hypertension- (present on admission)  Assessment & Plan  Continue propranolol       VTE prophylaxis: SC Heparin

## 2019-12-27 NOTE — DISCHARGE PLANNING
Patient is eligible for Medicaid Meds to Beds at discharge if they have coverage with [place] Medicaid, Medicaid FFS, Medicaid HMO ([facility]), or [place]. This service is provided through the [facility] if orders are received prior to 4 p.m. Monday through Friday, excluding holidays. Please call [phone] prior to discharge.

## 2019-12-27 NOTE — ED PROVIDER NOTES
ED Provider Note    Scribed for Florentino Gallo M.D. by Florentino Gallo. 12/26/2019,  8:31 PM.    CHIEF COMPLAINT  Chief Complaint   Patient presents with   • Wound Check     LLE chronic, blistered today       HPI  Goran Chavez is a 64 y.o. male with an extensive history of wound care to left lower extremity presents to the Emergency Department complaining of a sudden onset of left lower extremity blisters with increasing pain occurring over the past 24 to 48 hours.  Blisters are fluid filled and covering left lower extremity below the knee. Family member state that blisters were not there yesterday, however she states she did not pull up pant leg completely to examine, she did notice some drainage, and encouraged the patient to come the emergency department, but he declined that time because of the holiday.  Patient denies any other known trauma or falls. Denies fever, chills, nausea, or vomiting.  He reports increasing pain in his leg.  The patient has had a chronic poorly healing wounds because of stasis changes and peripheral vascular disease.  He has an extensive history with [facility], and was healing well, and was actually discharged from wound care in significantly improved condition at the end of November.  His family agrees that he was doing well at that time, and this is an abrupt change.  His most recent nonrevealing wound was on the posterior calf, the patient now presents with essentially circumferential large bullae, which extend well beyond the most recent wound care clinic reports of problem areas.    REVIEW OF SYSTEMS  See HPI for further details. All other systems are negative.     PAST MEDICAL HISTORY   has a past medical history of Anxiety, Charcot's joint of left foot, non-diabetic (3/21/2016), Chronic congestive heart failure (HCC) (11/16/2017), Hepatitis C, chronic (HCC), Hypertension, Migraine, Polysubstance abuse (HCC) (3/8/2018), Tobacco use (4/18/2016), Ulcer of left  lower extremity with necrosis of muscle (HCC) (3/21/2016), and Venous stasis ulcer (HCC) (2017).    SOCIAL HISTORY  Social History     Tobacco Use   • Smoking status: Former Smoker     Packs/day: 0.00     Years: 48.00     Pack years: 0.00     Types: Cigarettes     Last attempt to quit: 2018     Years since quittin.0   • Smokeless tobacco: Never Used   • Tobacco comment: states he is using 7mg nicotine patch   Substance and Sexual Activity   • Alcohol use: No     Alcohol/week: 7.2 oz     Types: 12 Cans of beer per week     Comment: history of alcoholism    • Drug use: Yes     Types: Marijuana, Inhaled, Methamphetamines     Comment: MJ every day, intermittent meth use   • Sexual activity: Not on file     Social History     Substance and Sexual Activity   Drug Use Yes   • Types: Marijuana, Inhaled, Methamphetamines    Comment: MJ every day, intermittent meth use       SURGICAL HISTORY   has a past surgical history that includes drain skin abscess simple (Left); tibia orif (Right, ); shoulder orif (Left, ); and hand surgery (Left).    CURRENT MEDICATIONS  Home Medications     Reviewed by Viktoriya Stoddard (Pharmacy Tech) on 19 at 2114  Med List Status: Complete   Medication Last Dose Status   amLODIPine (NORVASC) 10 MG Tab 2019 Active   atorvastatin (LIPITOR) 40 MG Tab 2019 Active   citalopram (CELEXA) 20 MG Tab 2019 Active   propranolol (INDERAL) 10 MG Tab 2019 Active                ALLERGIES  Allergies   Allergen Reactions   • Norco [Hydrocodone-Acetaminophen] Itching     CT-EXTREMITY, LOWER WITH RIGHT    (Results Pending)   CT-EXTREMITY, LOWER WITH LEFT    (Results Pending)     Labs Reviewed   WESTERGREN SED RATE - Abnormal; Notable for the following components:       Result Value    Sed Rate Westergren 57 (*)     All other components within normal limits   CRP QUANTITIVE (NON-CARDIAC) - Abnormal; Notable for the following components:    Stat C-Reactive Protein  "11.77 (*)     All other components within normal limits   CBC WITH DIFFERENTIAL - Abnormal; Notable for the following components:    RBC 3.93 (*)     Hemoglobin 12.2 (*)     Hematocrit 38.5 (*)     MCV 98.0 (*)     MCHC 31.7 (*)     RDW 52.8 (*)     Neutrophils-Polys 73.50 (*)     Lymphocytes 12.20 (*)     Lymphs (Absolute) 0.85 (*)     All other components within normal limits   BASIC METABOLIC PANEL - Abnormal; Notable for the following components:    Glucose 100 (*)     Bun 40 (*)     All other components within normal limits   PROCALCITONIN   CULTURE WOUND W/ GRAM STAIN   BLOOD CULTURE    Narrative:     Per Hospital Policy: Only change Specimen Src: to \"Line\" if  specified by physician order.   BLOOD CULTURE    Narrative:     Per Hospital Policy: Only change Specimen Src: to \"Line\" if  specified by physician order.   ESTIMATED GFR         PHYSICAL EXAM  VITAL SIGNS: /83   Pulse 70   Temp 36.2 °C (97.2 °F) (Oral)   Resp 18   Ht 1.803 m (5' 11\")   Wt 83.8 kg (184 lb 11.9 oz)   SpO2 98%   BMI 25.77 kg/m²   Pulse ox interpretation: I interpret this pulse ox as normal.  Constitutional: Alert in no apparent distress.  HENT: No signs of trauma, Bilateral external ears normal, Nose normal.   Eyes: Conjunctiva normal, Non-icteric.   Neck: Normal range of motion, Supple, No stridor.   Lymphatic: No lymphadenopathy noted.   Cardiovascular: Regular rate and rhythm, no murmurs.   Thorax & Lungs: Normal breath sounds, No respiratory distress, No wheezing, No chest tenderness.   Abdomen: Bowel sounds normal, Soft, No tenderness, No masses, No pulsatile masses. No peritoneal signs.  Skin: Left lower extremity with multiple 5-20 cm white and clear fluid filled tense bullae with serous drainage. Warm, Dry.  Back: No midline bony tenderness.   Extremities: Left lower extremity with multiple 5-20 cm white and clear fluid filled tense bullae with serous drainage. Extensive chronic venous statis changes to bilateral " lower extremity below the knee. No cyanosis.  Musculoskeletal: Good range of motion in all major joints. No or major deformities noted.   Neurologic: Alert , Normal motor function, Normal sensory function, No focal deficits noted.   Psychiatric: Affect normal, Judgment normal, Mood normal.     COURSE & MEDICAL DECISION MAKING  Nursing notes, VS, PMSFHx reviewed in chart.     8:31 PM - Patient seen and examined at bedside. Differential diagnosis includes but is not limited to wound infection, gangrenous change, necrotizing fascitis. Admitting doctor (Dr. Hinds) presents at bedside and agrees to further evaluate the patient in the hospital.     10:00 PM the patient has remained stable.  MRI has called and will be taking the patient soon.  His labs so far did not show signs of significant bacterial infection.  His CRP is elevated, consistent with an inflammatory condition, though obviously infection still needs to be ruled out.      DISPOSITION:  Patient will be hospitalized by Dr. Hinds in guarded condition.     FINAL IMPRESSION  1. Bullae    2. Ulcer of left lower extremity, unspecified ulcer stage (HCC)    3. PVD (peripheral vascular disease) (HCC)         Brianna LARSON (Scribe), am scribing for, and in the presence of, Florentino Gallo M.D..     Electronically signed by: Brianna Diaz (Scribe), 12/26/2019     Florentino LARSON M.D. personally performed the services described in this documentation, as scribed by Brianna Diaz in my presence, and it is both accurate and complete. C    The note accurately reflects work and decisions made by me.  Florentino Gallo  12/26/2019  10:02 PM

## 2019-12-27 NOTE — PROGRESS NOTES
"Pharmacy Kinetics 64 y.o. male on vancomycin day # 2     2019    Currently on Vancomycin 1200 mg iv q12hr ([id number])  Provider specified end date: TBD    Indication for Treatment: SSTI    Pertinent history per medical record: Admitted on 2019 for Cellulitis and abscess of right leg. 63 y/o m w/ PMHx CHF, hypertension, anxiety, who presents with right leg pain and swelling. Patient followed by wound care.  PTA pt recently noticed lower extremity blisters and increasing pain, fluid-filled blisters are yellow pus containing.      Other antibiotics: Unasyn 3g iv q6hr     Allergies: Norco [hydrocodone-acetaminophen]     List concerns for renal function:  BUN/SCr ratio > 20:1, CHF,CKD III.     Pertinent cultures to date:   19:PBCx2:NGTD  19:Lt Lg,WND: NGTD    MRSA nares swab if pneumonia is a concern (ordered/positive/negative/n-a): NA    Recent Labs     19  0243   WBC 7.0 6.8   NEUTSPOLYS 73.50* 65.40     Recent Labs     19  0243   BUN 40* 31*   CREATININE 1.13 0.91   ALBUMIN  --  3.4     No results for input(s): VANCOTROUGH, VANCOPEAK, VANCORANDOM in the last 72 hours.    Intake/Output Summary (Last 24 hours) at 2019 1008  Last data filed at 2019 0900  Gross per 24 hour   Intake --   Output 1550 ml   Net -1550 ml      /91   Pulse 67   Temp 37.4 °C (99.4 °F) (Temporal)   Resp 16   Ht 1.803 m (5' 11\")   Wt 81.7 kg (180 lb 1.9 oz)   SpO2 92%  Temp (24hrs), Av.8 °C (98.2 °F), Min:36.2 °C (97.2 °F), Max:37.4 °C (99.4 °F)      A/P   1. Vancomycin dose change: No change   2. Next vancomycin level: 19@1000  3. Goal trough: 12-16 mcg/mL   4. Comments: No leukocytosis. Afebrile , cx NGTD. Renal indices decreased over interval, level ordered. Continue current dose.     Marko TinocoD BCPS   "

## 2019-12-27 NOTE — ED TRIAGE NOTES
"Goran Chavez  64 y.o. male  Chief Complaint   Patient presents with   • Wound Check     LLE chronic, blistered today       Pt WC to triage  for above complaint.      Patient states his chronic wound flared up today. LLE has numerous large blisters, and discoloration. Patient states he was DC from wound care x2 months ago and has been caring for his wounds self.       Pt back to lobby. Educated to inform staff of any concerns or changes.         Blood Pressure: 135/83, Pulse: 70, Respiration: 18, Temperature: 36.2 °C (97.2 °F), Height: 180.3 cm (5' 11\"), Weight: 83.8 kg (184 lb 11.9 oz), Pulse Oximetry: 98 %    "

## 2019-12-27 NOTE — PROGRESS NOTES
"Pharmacy Kinetics 64 y.o. male on vancomycin day # 1 2019    Currently on Vancomycin 2100 mg load in ED  Provider specified end date: TBD    Indication for Treatment: SSTI    Pertinent history per medical record: Admitted on 2019 for cellulitis. Patient is followed by wound care but came to ED as wounds were worsening.    Other antibiotics: Unasyn 3g q6H    Allergies: Norco [hydrocodone-acetaminophen]     List concerns for renal function (possible concerns include abnormal LFTs, BUN/SCr ratio > 20:1, CHF, obesity, malnutrition/low albumin, hypermetabolic state (SIRS), pressors/hypotension, nephrotoxic drugs, etc.): BUN/SCr, contrast    Pertinent cultures to date:   Pending    MRSA nares swab if pneumonia is a concern (ordered/positive/negative/n-a): H/o MRSA    Recent Labs     19   WBC 7.0   NEUTSPOLYS 73.50*     No results for input(s): BUN, CREATININE, ALBUMIN in the last 72 hours.  No results for input(s): VANCOTROUGH, VANCOPEAK, VANCORANDOM in the last 72 hours.No intake or output data in the 24 hours ending 19 2130   /85   Pulse 77   Temp 36.2 °C (97.2 °F) (Oral)   Resp 18   Ht 1.803 m (5' 11\")   Wt 83.8 kg (184 lb 11.9 oz)   SpO2 96%  Temp (24hrs), Av.2 °C (97.2 °F), Min:36.2 °C (97.2 °F), Max:36.2 °C (97.2 °F)      A/P   1. Vancomycin dose change: New start- vancomycin 2100 mg load followed by 1200 mg (15 mg/kg) q12H (1030, 2130) X 3 doses  2. Next vancomycin level: Prior to 4th dose of vancomycin or sooner pending renal function- will need to be ordered  3. Goal trough: 12-16 mcg/mL  4. Comments: 64 year old man present with worsening lower extremity infection. Empirically started on Unasyn + vanco. Concerns for renal function include: CKD stage 3, contrast, and concomitant use with Unasyn. Patient does have history of vancomycin usage- previously required q12H dosing. Opted to give vancomycin 2100 mg load followed by vancomycin 1200 q12H X3 doses.     Naomy BROOKE" Arnol, PharmD

## 2019-12-27 NOTE — PROGRESS NOTES
Diabetes education: Pt does not have a dx of diabetes listed, on no DM medications and recent blood sugars below 100.  Plan: pt is not on any DM medications or finger sticks, has no dx of diabetes and does not meet criteria. Not appropriate for DM education/referral. If needs change please  Reorder.

## 2019-12-27 NOTE — RESPIRATORY CARE
COPD EDUCATION by COPD CLINICAL EDUCATOR  12/27/2019 at 7:08 AM by Natalee Hayward     Patient reviewed by COPD education team. Patient does not have a history or diagnosis of COPD and is a non-smoker, therefore does not qualify for the COPD program.

## 2019-12-27 NOTE — CARE PLAN
Problem: Safety  Goal: Will remain free from falls  Outcome: PROGRESSING AS EXPECTED  Note:   Reinforced fall education     Problem: Infection  Goal: Will remain free from infection  Outcome: PROGRESSING AS EXPECTED  Note:   On antibiotics, monitor for early S&S of infection

## 2019-12-27 NOTE — PROGRESS NOTES
.2 RN Skin Check    2 RN skin check completed with kalyan price nurse    Devices in place: none.  Skin assessed under devices: N\A.  Confirmed pressure ulcers found on: none   New potential pressure ulcers noted on none.    Pt presents to floor with multiple large blisters on left lower extremity. Blisters circumferentially spreads along anterior, lateral, and medial surfaces. Blisters intact. No open skin noted around blister.     Generalized redness throughout LLE.    Clinical photos taken and uploaded to pt chart, images linked to appropriate LDA.       Wound consult placed Yes.  The following interventions in place Pillows.

## 2019-12-27 NOTE — H&P
Hwill require at least two midnights for appropriate medical management, necessitating inpatient admission.is appropriate for observation status at this time.will require at least two midnights for appropriate medical management, necessitating inpatient admission.will require at least two midnights for appropriate medical management, necessitating inpatient admission.will require at least two midnights for appropriate medical management, necessitating inpatient admission.will require at least two midnights for appropriate medical management, necessitating inpatient admission.ospital Medicine History & Physical Note    Date of Service  12/26/2019    Primary Care Physician  Marcela Ch M.D.    Consultants  none    Code Status  full    Chief Complaint  Right leg pain, swelling, blisters    History of Presenting Illness  64 y.o. male who presented 12/26/2019 with past medical history of CHF, hypertension, anxiety, tobacco abuse who presents with right leg pain and swelling.  This patient is been in wound care and was recently.  He recently started noticed lower extremity blisters and increasing pain over the last 24 to 48 hours.  He is also noticed fluid-filled blisters are yellow pus containing.  And he is also noticed pus on his blood sheets.  Otherwise he has no known alleviating or exacerbating factors to his symptoms.  He has a history of chronic poorly healing wounds due to peripheral vascular disease.  He will be admitted to the hospital for further management of his symptoms.    Review of Systems  Review of Systems   Constitutional: Negative for chills and fever.   HENT: Negative for congestion, hearing loss and tinnitus.    Eyes: Negative for blurred vision, double vision and discharge.   Respiratory: Negative for cough, hemoptysis and shortness of breath.    Cardiovascular: Positive for leg swelling. Negative for chest pain and palpitations.   Gastrointestinal: Negative for abdominal pain, heartburn,  nausea and vomiting.   Genitourinary: Negative for dysuria and flank pain.   Musculoskeletal: Negative for joint pain and myalgias.   Skin: Positive for rash.   Neurological: Negative for dizziness, sensory change, speech change, focal weakness and weakness.   Endo/Heme/Allergies: Negative for environmental allergies. Does not bruise/bleed easily.   Psychiatric/Behavioral: Negative for depression, hallucinations and substance abuse.       Past Medical History   has a past medical history of Anxiety, Charcot's joint of left foot, non-diabetic (3/21/2016), Chronic congestive heart failure (HCC) (11/16/2017), Hepatitis C, chronic ([facility]), Hypertension, Migraine, Polysubstance abuse ([facility]) (3/8/2018), Tobacco use (4/18/2016), Ulcer of left lower extremity with necrosis of muscle ([facility]) (3/21/2016), and Venous stasis ulcer ([facility]) (2017).    Surgical History   has a past surgical history that includes pr drain skin abscess simple (Left); tibia orif (Right, 1997); shoulder orif (Left, 1997); and hand surgery (Left).     Family History  family history includes Hypertension in his mother; Lung Disease (age of onset: 70) in his mother; Other (age of onset: 82) in his father.     Social History   reports that he quit smoking about 12 months ago. His smoking use included cigarettes. He smoked 0.00 packs per day for 48.00 years. He has never used smokeless tobacco. He reports current drug use. Drugs: Marijuana, Inhaled, and Methamphetamines. He reports that he does not drink alcohol.    Allergies  Allergies   Allergen Reactions   • Norco [Hydrocodone-Acetaminophen] Itching       Medications  Prior to Admission Medications   Prescriptions Last Dose Informant Patient Reported? Taking?   MAVYRET 100-40 MG Tab tablet   Yes No   amLODIPine (NORVASC) 10 MG Tab   No No   Sig: Take 1 Tab by mouth every day.   aspirin EC (ECOTRIN) 81 MG Tablet Delayed Response  Patient No No   Sig: Take 1 Tab by mouth every day.   atorvastatin (LIPITOR) 40 MG  Tab   No No   Sig: Take 1 Tab by mouth every bedtime.   citalopram (CELEXA) 20 MG Tab   No No   Sig: TAKE 1 TABLET BY MOUTH ONCE DAILY   polyethylene glycol-electrolytes (NULYTELY) 420 GM solution   Yes No   propranolol (INDERAL) 10 MG Tab   No No   Sig: TAKE 1 TABLET BY MOUTH TWICE DAILY      Facility-Administered Medications: None       Physical Exam  Temp:  [36.2 °C (97.2 °F)] 36.2 °C (97.2 °F)  Pulse:  [70] 70  Resp:  [18] 18  BP: (135)/(83) 135/83  SpO2:  [98 %] 98 %    Physical Exam  Vitals signs reviewed.   Constitutional:       General: He is not in acute distress.     Appearance: He is ill-appearing.   HENT:      Head: Normocephalic and atraumatic.      Nose: No congestion.      Mouth/Throat:      Mouth: Mucous membranes are moist.   Eyes:      Extraocular Movements: Extraocular movements intact.      Pupils: Pupils are equal, round, and reactive to light.   Neck:      Musculoskeletal: Neck supple.   Cardiovascular:      Rate and Rhythm: Normal rate and regular rhythm.      Pulses: Normal pulses.      Heart sounds: Normal heart sounds.   Pulmonary:      Effort: Pulmonary effort is normal. No respiratory distress.      Breath sounds: Normal breath sounds. No wheezing.   Abdominal:      General: Bowel sounds are normal. There is no distension.      Palpations: Abdomen is soft.      Tenderness: There is no tenderness.   Musculoskeletal:         General: Swelling present.      Comments: Left leg with multiple pus containing blisters and bullae, chronic changes to bilateral lower extremities   Skin:     General: Skin is warm and dry.      Capillary Refill: Capillary refill takes less than 2 seconds.   Neurological:      General: No focal deficit present.      Mental Status: He is alert and oriented to person, place, and time. Mental status is at baseline.   Psychiatric:         Mood and Affect: Mood normal.         Behavior: Behavior normal.         Thought Content: Thought content normal.         Judgment:  Judgment normal.         Laboratory:          Recent Labs     12/26/19 2025   GLUCOSE 100*         No results for input(s): NTPROBNP in the last 72 hours.      No results for input(s): TROPONINT in the last 72 hours.    Urinalysis:    No results found     Imaging:  CT-EXTREMITY, LOWER WITH LEFT    (Results Pending)   CT-EXTREMITY, LOWER WITH RIGHT    (Results Pending)         Assessment/Plan:  I anticipate this patient   Require more than 2 hospital midnight stays to meet inpatient criteria.  * Blister of left lower leg with infection  Assessment & Plan  Multiple blisters fluid filled, appear infected  Cont with IV unasyn and vanc  Cultures ordered, wound cultures  Procal/esr/crp   LPS consultation   Wound care  CT left leg ordered stat for eval       Anemia  Assessment & Plan  Mild, no evidence of bleeding cont to montior    Hyperlipidemia- (present on admission)  Assessment & Plan  Resume home statin therapy     Stage 3 chronic kidney disease (HCC)- (present on admission)  Assessment & Plan  Avoid nephrotoxic medications   Cont to monitor       Atherosclerosis of native artery of left lower extremity (HCC)- (present on admission)  Assessment & Plan  Known hx of on statin therapy     HTN (hypertension)- (present on admission)  Assessment & Plan  Resume home BB       VTE prophylaxis: heparin

## 2019-12-27 NOTE — ED NOTES
Dr. Hinds paged and updated that CT has not be completed. Per doctor wait till results are completed to send him upstairs.

## 2019-12-27 NOTE — DISCHARGE PLANNING
Care Transition Team Assessment      LSW reviewed chart and spoke with pt at bedside. Pt verified the Facesheet was accurate. Pt reported that he lives in a mobile home with his daughter. Pt states that his social support is his daughter and other family that live in the local area. Pt reports that his pharmacy is at [facility] [address]. Pt states that he does own a walker. Pt reports no hx of mh,sa, and dv.     Information Source  Orientation : Oriented x 4  Information Given By: Patient  Elopement Risk  Legal Hold: No  Ambulatory or Self Mobile in Wheelchair: Yes  Disoriented: No  Psychiatric Symptoms: None  History of Wandering: No  Elopement this Admit: No  Vocalizing Wanting to Leave: No  Displays Behaviors, Body Language Wanting to Leave: No-Not at Risk for Elopement  Elopement Risk: Not at Risk for Elopement    Interdisciplinary Discharge Planning  Patient or legal guardian wants to designate a caregiver (see row info): No    Discharge Preparedness  What is your plan after discharge?: Home with help  What are your discharge supports?: Child, Other (comment)(pt stated family)  Prior Functional Level: Needs Assist with Activities of Daily Living, Uses Walker  Difficulity with ADLs: None    Functional Assesment  Prior Functional Level: Needs Assist with Activities of Daily Living, Uses Walker    Finances  Financial Barriers to Discharge: No  Prescription Coverage: No  Advance Directive  Advance Directive?: None  Advance Directive offered?: AD Booklet refused    Domestic Abuse  Have you ever been the victim of abuse or violence?: No  Physical Abuse or Sexual Abuse: No  Verbal Abuse or Emotional Abuse: No  Possible Abuse Reported to:: Not Applicable    Psychological Assessment  History of Substance Abuse: None  History of Psychiatric Problems: No    Discharge Risks or Barriers  Discharge risks or barriers?: No    Anticipated Discharge Information  Anticipated discharge disposition: Home

## 2019-12-27 NOTE — DIETARY
Nutrition services: Day 1 of admit.  Consult received from MD for Diabetes Meal Planning education.  Pt is on a regular diet, no history of DM, A1c 5.5.  DM educator note also confirms this pt does not have diabetes.    Discussed with RN.

## 2019-12-28 ENCOUNTER — APPOINTMENT (OUTPATIENT)
Dept: CARDIOLOGY | Facility: MEDICAL CENTER | Age: 64
DRG: 300 | End: 2019-12-28
Attending: INTERNAL MEDICINE
Payer: MEDICAID

## 2019-12-28 LAB
ALBUMIN SERPL BCP-MCNC: 3.2 G/DL (ref 3.2–4.9)
ALBUMIN/GLOB SERPL: 1 G/DL
ALP SERPL-CCNC: 48 U/L (ref 30–99)
ALT SERPL-CCNC: 19 U/L (ref 2–50)
ANION GAP SERPL CALC-SCNC: 5 MMOL/L (ref 0–11.9)
AST SERPL-CCNC: 23 U/L (ref 12–45)
BILIRUB SERPL-MCNC: 0.4 MG/DL (ref 0.1–1.5)
BUN SERPL-MCNC: 23 MG/DL (ref 8–22)
CALCIUM SERPL-MCNC: 8.5 MG/DL (ref 8.5–10.5)
CHLORIDE SERPL-SCNC: 108 MMOL/L (ref 96–112)
CO2 SERPL-SCNC: 25 MMOL/L (ref 20–33)
CREAT SERPL-MCNC: 1.03 MG/DL (ref 0.5–1.4)
ERYTHROCYTE [DISTWIDTH] IN BLOOD BY AUTOMATED COUNT: 56 FL (ref 35.9–50)
FERRITIN SERPL-MCNC: 77.7 NG/ML (ref 22–322)
FOLATE SERPL-MCNC: 19.6 NG/ML
GLOBULIN SER CALC-MCNC: 3.2 G/DL (ref 1.9–3.5)
GLUCOSE SERPL-MCNC: 76 MG/DL (ref 65–99)
HCT VFR BLD AUTO: 38.6 % (ref 42–52)
HGB BLD-MCNC: 11.7 G/DL (ref 14–18)
HGB RETIC QN AUTO: 29.2 PG/CELL (ref 29–35)
IMM RETICS NFR: 24.4 % (ref 9.3–17.4)
IRON SATN MFR SERPL: 13 % (ref 15–55)
IRON SERPL-MCNC: 35 UG/DL (ref 50–180)
LV EJECT FRACT  99904: 55
MAGNESIUM SERPL-MCNC: 1.9 MG/DL (ref 1.5–2.5)
MCH RBC QN AUTO: 31 PG (ref 27–33)
MCHC RBC AUTO-ENTMCNC: 30.3 G/DL (ref 33.7–35.3)
MCV RBC AUTO: 102.4 FL (ref 81.4–97.8)
PHOSPHATE SERPL-MCNC: 3.1 MG/DL (ref 2.5–4.5)
PLATELET # BLD AUTO: 254 K/UL (ref 164–446)
PMV BLD AUTO: 9.5 FL (ref 9–12.9)
POTASSIUM SERPL-SCNC: 4.9 MMOL/L (ref 3.6–5.5)
PROT SERPL-MCNC: 6.4 G/DL (ref 6–8.2)
RBC # BLD AUTO: 3.77 M/UL (ref 4.7–6.1)
RETICS # AUTO: 0.08 M/UL (ref 0.04–0.06)
RETICS/RBC NFR: 2.1 % (ref 0.8–2.1)
SODIUM SERPL-SCNC: 138 MMOL/L (ref 135–145)
TIBC SERPL-MCNC: 265 UG/DL (ref 250–450)
TSH SERPL DL<=0.005 MIU/L-ACNC: 4.37 UIU/ML (ref 0.38–5.33)
VANCOMYCIN TROUGH SERPL-MCNC: 18.3 UG/ML (ref 10–20)
VIT B12 SERPL-MCNC: 226 PG/ML (ref 211–911)
WBC # BLD AUTO: 5.2 K/UL (ref 4.8–10.8)

## 2019-12-28 PROCEDURE — 84443 ASSAY THYROID STIM HORMONE: CPT

## 2019-12-28 PROCEDURE — 90471 IMMUNIZATION ADMIN: CPT

## 2019-12-28 PROCEDURE — 700111 HCHG RX REV CODE 636 W/ 250 OVERRIDE (IP): Performed by: INTERNAL MEDICINE

## 2019-12-28 PROCEDURE — 82607 VITAMIN B-12: CPT

## 2019-12-28 PROCEDURE — 36415 COLL VENOUS BLD VENIPUNCTURE: CPT

## 2019-12-28 PROCEDURE — 85046 RETICYTE/HGB CONCENTRATE: CPT

## 2019-12-28 PROCEDURE — 770006 HCHG ROOM/CARE - MED/SURG/GYN SEMI*

## 2019-12-28 PROCEDURE — 700111 HCHG RX REV CODE 636 W/ 250 OVERRIDE (IP): Performed by: HOSPITALIST

## 2019-12-28 PROCEDURE — A9270 NON-COVERED ITEM OR SERVICE: HCPCS | Performed by: INTERNAL MEDICINE

## 2019-12-28 PROCEDURE — 700105 HCHG RX REV CODE 258: Performed by: INTERNAL MEDICINE

## 2019-12-28 PROCEDURE — 82728 ASSAY OF FERRITIN: CPT

## 2019-12-28 PROCEDURE — 83735 ASSAY OF MAGNESIUM: CPT

## 2019-12-28 PROCEDURE — 700105 HCHG RX REV CODE 258: Performed by: HOSPITALIST

## 2019-12-28 PROCEDURE — 83540 ASSAY OF IRON: CPT

## 2019-12-28 PROCEDURE — 97161 PT EVAL LOW COMPLEX 20 MIN: CPT

## 2019-12-28 PROCEDURE — 85027 COMPLETE CBC AUTOMATED: CPT

## 2019-12-28 PROCEDURE — 80202 ASSAY OF VANCOMYCIN: CPT

## 2019-12-28 PROCEDURE — 93306 TTE W/DOPPLER COMPLETE: CPT | Mod: 26 | Performed by: INTERNAL MEDICINE

## 2019-12-28 PROCEDURE — 80053 COMPREHEN METABOLIC PANEL: CPT

## 2019-12-28 PROCEDURE — 99233 SBSQ HOSP IP/OBS HIGH 50: CPT | Performed by: INTERNAL MEDICINE

## 2019-12-28 PROCEDURE — 93306 TTE W/DOPPLER COMPLETE: CPT

## 2019-12-28 PROCEDURE — A9270 NON-COVERED ITEM OR SERVICE: HCPCS | Performed by: HOSPITALIST

## 2019-12-28 PROCEDURE — 82746 ASSAY OF FOLIC ACID SERUM: CPT

## 2019-12-28 PROCEDURE — 3E02340 INTRODUCTION OF INFLUENZA VACCINE INTO MUSCLE, PERCUTANEOUS APPROACH: ICD-10-PCS | Performed by: INTERNAL MEDICINE

## 2019-12-28 PROCEDURE — 84100 ASSAY OF PHOSPHORUS: CPT

## 2019-12-28 PROCEDURE — 700102 HCHG RX REV CODE 250 W/ 637 OVERRIDE(OP): Performed by: HOSPITALIST

## 2019-12-28 PROCEDURE — 700102 HCHG RX REV CODE 250 W/ 637 OVERRIDE(OP): Performed by: INTERNAL MEDICINE

## 2019-12-28 PROCEDURE — 90686 IIV4 VACC NO PRSV 0.5 ML IM: CPT | Performed by: INTERNAL MEDICINE

## 2019-12-28 PROCEDURE — 83550 IRON BINDING TEST: CPT

## 2019-12-28 RX ORDER — CHOLECALCIFEROL (VITAMIN D3) 125 MCG
1000 CAPSULE ORAL DAILY
Status: DISCONTINUED | OUTPATIENT
Start: 2019-12-29 | End: 2020-01-02 | Stop reason: HOSPADM

## 2019-12-28 RX ORDER — FERROUS GLUCONATE 324(38)MG
324 TABLET ORAL 2 TIMES DAILY WITH MEALS
Status: DISCONTINUED | OUTPATIENT
Start: 2019-12-28 | End: 2020-01-02 | Stop reason: HOSPADM

## 2019-12-28 RX ORDER — ASCORBIC ACID 500 MG
500 TABLET ORAL 2 TIMES DAILY
Status: DISCONTINUED | OUTPATIENT
Start: 2019-12-28 | End: 2020-01-02 | Stop reason: HOSPADM

## 2019-12-28 RX ADMIN — HEPARIN SODIUM 5000 UNITS: 5000 INJECTION, SOLUTION INTRAVENOUS; SUBCUTANEOUS at 20:54

## 2019-12-28 RX ADMIN — TRAMADOL HYDROCHLORIDE 50 MG: 50 TABLET, FILM COATED ORAL at 04:59

## 2019-12-28 RX ADMIN — ATORVASTATIN CALCIUM 40 MG: 40 TABLET, FILM COATED ORAL at 20:55

## 2019-12-28 RX ADMIN — POLYETHYLENE GLYCOL 3350 1 PACKET: 17 POWDER, FOR SOLUTION ORAL at 04:59

## 2019-12-28 RX ADMIN — HEPARIN SODIUM 5000 UNITS: 5000 INJECTION, SOLUTION INTRAVENOUS; SUBCUTANEOUS at 05:10

## 2019-12-28 RX ADMIN — AMPICILLIN SODIUM AND SULBACTAM SODIUM 3 G: 2; 1 INJECTION, POWDER, FOR SOLUTION INTRAMUSCULAR; INTRAVENOUS at 20:54

## 2019-12-28 RX ADMIN — ASPIRIN 81 MG: 81 TABLET, COATED ORAL at 04:59

## 2019-12-28 RX ADMIN — HEPARIN SODIUM 5000 UNITS: 5000 INJECTION, SOLUTION INTRAVENOUS; SUBCUTANEOUS at 15:01

## 2019-12-28 RX ADMIN — PROPRANOLOL HYDROCHLORIDE 10 MG: 10 TABLET ORAL at 17:04

## 2019-12-28 RX ADMIN — AMPICILLIN SODIUM AND SULBACTAM SODIUM 3 G: 2; 1 INJECTION, POWDER, FOR SOLUTION INTRAMUSCULAR; INTRAVENOUS at 15:01

## 2019-12-28 RX ADMIN — TRAMADOL HYDROCHLORIDE 50 MG: 50 TABLET, FILM COATED ORAL at 21:06

## 2019-12-28 RX ADMIN — PROPRANOLOL HYDROCHLORIDE 10 MG: 10 TABLET ORAL at 05:00

## 2019-12-28 RX ADMIN — AMPICILLIN SODIUM AND SULBACTAM SODIUM 3 G: 2; 1 INJECTION, POWDER, FOR SOLUTION INTRAMUSCULAR; INTRAVENOUS at 09:20

## 2019-12-28 RX ADMIN — AMLODIPINE BESYLATE 10 MG: 10 TABLET ORAL at 04:59

## 2019-12-28 RX ADMIN — TRAMADOL HYDROCHLORIDE 50 MG: 50 TABLET, FILM COATED ORAL at 17:06

## 2019-12-28 RX ADMIN — VANCOMYCIN HYDROCHLORIDE 1200 MG: 500 INJECTION, POWDER, LYOPHILIZED, FOR SOLUTION INTRAVENOUS at 10:57

## 2019-12-28 RX ADMIN — OXYCODONE HYDROCHLORIDE AND ACETAMINOPHEN 500 MG: 500 TABLET ORAL at 17:55

## 2019-12-28 RX ADMIN — FERROUS GLUCONATE 324 MG: 324 TABLET ORAL at 17:55

## 2019-12-28 RX ADMIN — TRAMADOL HYDROCHLORIDE 50 MG: 50 TABLET, FILM COATED ORAL at 11:00

## 2019-12-28 RX ADMIN — POLYETHYLENE GLYCOL 3350 1 PACKET: 17 POWDER, FOR SOLUTION ORAL at 17:04

## 2019-12-28 RX ADMIN — INFLUENZA A VIRUS A/BRISBANE/02/2018 IVR-190 (H1N1) ANTIGEN (FORMALDEHYDE INACTIVATED), INFLUENZA A VIRUS A/KANSAS/14/2017 X-327 (H3N2) ANTIGEN (FORMALDEHYDE INACTIVATED), INFLUENZA B VIRUS B/PHUKET/3073/2013 ANTIGEN (FORMALDEHYDE INACTIVATED), AND INFLUENZA B VIRUS B/MARYLAND/15/2016 BX-69A ANTIGEN (FORMALDEHYDE INACTIVATED) 0.5 ML: 15; 15; 15; 15 INJECTION, SUSPENSION INTRAMUSCULAR at 09:21

## 2019-12-28 RX ADMIN — AMPICILLIN SODIUM AND SULBACTAM SODIUM 3 G: 2; 1 INJECTION, POWDER, FOR SOLUTION INTRAMUSCULAR; INTRAVENOUS at 03:00

## 2019-12-28 RX ADMIN — VANCOMYCIN HYDROCHLORIDE 1000 MG: 500 INJECTION, POWDER, LYOPHILIZED, FOR SOLUTION INTRAVENOUS at 23:44

## 2019-12-28 RX ADMIN — CITALOPRAM HYDROBROMIDE 20 MG: 20 TABLET ORAL at 04:59

## 2019-12-28 ASSESSMENT — ENCOUNTER SYMPTOMS
BACK PAIN: 0
NECK PAIN: 0
WHEEZING: 0
DIAPHORESIS: 0
SEIZURES: 0
FEVER: 0
TREMORS: 0
HEMOPTYSIS: 0
FOCAL WEAKNESS: 0
ABDOMINAL PAIN: 0
HEARTBURN: 0
FLANK PAIN: 0
SPUTUM PRODUCTION: 0
NAUSEA: 0
PALPITATIONS: 0
DEPRESSION: 0
BLOOD IN STOOL: 0
WEAKNESS: 0
CONSTIPATION: 0
WEIGHT LOSS: 0
CHILLS: 0
PND: 0
SHORTNESS OF BREATH: 0
LOSS OF CONSCIOUSNESS: 0
DOUBLE VISION: 0
SENSORY CHANGE: 0
COUGH: 0
BLURRED VISION: 0
SPEECH CHANGE: 0
MYALGIAS: 0
DIARRHEA: 0
DIZZINESS: 0
HEADACHES: 0
TINGLING: 0
VOMITING: 0
FALLS: 0

## 2019-12-28 ASSESSMENT — LIFESTYLE VARIABLES: SUBSTANCE_ABUSE: 0

## 2019-12-28 NOTE — CARE PLAN
Problem: Venous Thromboembolism (VTW)/Deep Vein Thrombosis (DVT) Prevention:  Goal: Patient will participate in Venous Thrombosis (VTE)/Deep Vein Thrombosis (DVT)Prevention Measures  Outcome: PROGRESSING AS EXPECTED  Intervention: Assess and monitor for anticoagulation complications  Note:   Pt given heparin per MAR.     Problem: Pain Management  Goal: Pain level will decrease to patient's comfort goal  Outcome: PROGRESSING AS EXPECTED  Intervention: Follow pain managment plan developed in collaboration with patient and Interdisciplinary Team  Note:   Pt provided with pain mediation. Pt pain decreased to 0/10 from 6/10 with pain mediation.

## 2019-12-28 NOTE — CARE PLAN
Problem: Communication  Goal: The ability to communicate needs accurately and effectively will improve  Note:   Patient is A&Ox4. Makes needs known appropriately.      Problem: Pain Management  Goal: Pain level will decrease to patient's comfort goal  Note:   LLE pain managed well with oral medication. Patient reports adequate relief.

## 2019-12-28 NOTE — PROGRESS NOTES
Received change of shift report from day-shift RN and assumed care of patient at 1900. Assessment performed. Patient is alert and oriented x4. PRN medication given per MAR. Pt with BLE cellulitis and weeping blisters open to air. Patient updated on plan of care, including scheduled antibiotics. Patient call light within reach, personal possessions nearby, bed in low position and locked, hourly rounding in practice, and whiteboard updated.

## 2019-12-28 NOTE — PROGRESS NOTES
Care assumed from KEN Fontanez @ 0700. Pt A/Ox4. Pt resting in bed. Assessment complete. Meds administered per MAR. Bed lowered and locked. Call light within reach. Pt instructed to call for help. Bed alarm not indicated. Hourly rounding complete. Continue to monitor.

## 2019-12-28 NOTE — PROGRESS NOTES
Assumed patient care at 0700. Received report from night shift. Assessment completed. A&Ox4. C/o 7/10 LLE and right shoulder pain, medicated per MAR. Redness and swelling to bilateral lower extremities, LLE blistered YEIMY. Iv fluids infusing. Low fall risk, fall precautions in place; pt wearing treaded socks, personal possessions and call light placed within reach. POC discussed with pt, communication board updated. Pt denies any additional needs at this time.

## 2019-12-28 NOTE — CARE PLAN
Problem: Safety  Goal: Will remain free from falls  Outcome: PROGRESSING AS EXPECTED   Pt calls appropriately for assistance.    Problem: Pain Management  Goal: Pain level will decrease to patient's comfort goal  Outcome: PROGRESSING SLOWER THAN EXPECTED   Pt c/o bilateral leg pain.

## 2019-12-28 NOTE — PROGRESS NOTES
Community Health Worker Intake  • Social determinates of health intake complete.   • Identified barriers to none.  • Contact information provided to Goran Chavez.  • Has PCP appointment scheduled for Marcela Ch.  • Scheduled January 8,2020 at 4:00pm.       12/27. CHW Shahriar met with pt to introduce CCM services. Pt confirmed his pcp is Marcela Ch. No follow up appmnt yet. CHW will assist. No transportation issues getting to medical appointments. Pt takes the bus and uses [employer]. Pt indicated no trouble paying for resources such as care, and housing. He indicated no food insecurity. Pt has daughter for support system. Pt lives in a mobile home. Pt declined nurse call for health education. Informed pt about Meds to beds. Pt feels confident in managing his health after d/c.    Plan: Assist with pcp appmnt. Follow up call after d/c.

## 2019-12-28 NOTE — PROGRESS NOTES
"Pharmacy Kinetics 64 y.o. male on vancomycin day # 3     2019    Currently on Vancomycin 1200 mg iv q12hr (4011 [id number])  Provider specified end date: TBD     Indication for Treatment: SSTI     Pertinent history per medical record: Admitted on 2019 for Cellulitis and abscess of right leg. 65 y/o m w/ PMHx CHF, hypertension, anxiety, who presents with right leg pain and swelling. Patient followed by wound care.  PTA pt recently noticed lower extremity blisters and increasing pain, fluid-filled blisters are yellow pus containing.       Other antibiotics: Unasyn 3g iv q6hr      Allergies: Norco [hydrocodone-acetaminophen]      List concerns for renal function:  BUN/SCr ratio > 20:1, CHF,CKD III.      Pertinent cultures to date:   19:PBCx2:NGTD  19:Lt Lg,WND: NGTD     MRSA nares swab if pneumonia is a concern (ordered/positive/negative/n-a): NA    Recent Labs     19  0243 19  0334   WBC 7.0 6.8 5.2   NEUTSPOLYS 73.50* 65.40  --      Recent Labs     19  0243 19  0334   BUN 40* 31* 23*   CREATININE 1.13 0.91 1.03   ALBUMIN  --  3.4 3.2     No results for input(s): VANCOTROUGH, VANCOPEAK, VANCORANDOM in the last 72 hours.    Intake/Output Summary (Last 24 hours) at 2019 0917  Last data filed at 2019 0750  Gross per 24 hour   Intake 690 ml   Output 2725 ml   Net -2035 ml      /86   Pulse 67   Temp 37.2 °C (98.9 °F) (Temporal)   Resp 17   Ht 1.803 m (5' 11\")   Wt 81.1 kg (178 lb 12.7 oz)   SpO2 95%  Temp (24hrs), Av °C (98.6 °F), Min:36.7 °C (98 °F), Max:37.2 °C (99 °F)      A/P        1. Vancomycin dose change: No change   2. Next vancomycin level: 19@1000  3. Goal trough: 12-16 mcg/mL   4. Comments: No leukocytosis. Afebrile , cx NGTD. Renal indices elevated, level ordered. Continue current dose. Consider abx de escalation when clinically warranted.      Marko Palacios PharmD BCPS        2019 10:27   Vancomycin " Trough 18.3       A/P        5. Vancomycin dose change: Vancomycin 1000 mg iv q12hr (1200 0000)  6. Next vancomycin level: 12/29/19@1130  7. Goal trough: 12-16 mcg/mL   8. Comments: Level above goal, maintenance dose reduced.       Marko TinocoD BCPS

## 2019-12-29 LAB
ANION GAP SERPL CALC-SCNC: 6 MMOL/L (ref 0–11.9)
BACTERIA WND AEROBE CULT: ABNORMAL
BUN SERPL-MCNC: 17 MG/DL (ref 8–22)
CALCIUM SERPL-MCNC: 8.5 MG/DL (ref 8.5–10.5)
CHLORIDE SERPL-SCNC: 104 MMOL/L (ref 96–112)
CO2 SERPL-SCNC: 27 MMOL/L (ref 20–33)
CREAT SERPL-MCNC: 0.92 MG/DL (ref 0.5–1.4)
ERYTHROCYTE [DISTWIDTH] IN BLOOD BY AUTOMATED COUNT: 51.1 FL (ref 35.9–50)
GLUCOSE SERPL-MCNC: 77 MG/DL (ref 65–99)
GRAM STN SPEC: ABNORMAL
HCT VFR BLD AUTO: 37.6 % (ref 42–52)
HGB BLD-MCNC: 12 G/DL (ref 14–18)
MCH RBC QN AUTO: 30.8 PG (ref 27–33)
MCHC RBC AUTO-ENTMCNC: 31.9 G/DL (ref 33.7–35.3)
MCV RBC AUTO: 96.7 FL (ref 81.4–97.8)
PLATELET # BLD AUTO: 278 K/UL (ref 164–446)
PMV BLD AUTO: 8.8 FL (ref 9–12.9)
POTASSIUM SERPL-SCNC: 4.6 MMOL/L (ref 3.6–5.5)
RBC # BLD AUTO: 3.89 M/UL (ref 4.7–6.1)
SIGNIFICANT IND 70042: ABNORMAL
SITE SITE: ABNORMAL
SODIUM SERPL-SCNC: 137 MMOL/L (ref 135–145)
SOURCE SOURCE: ABNORMAL
VANCOMYCIN TROUGH SERPL-MCNC: 22.1 UG/ML (ref 10–20)
WBC # BLD AUTO: 5.6 K/UL (ref 4.8–10.8)

## 2019-12-29 PROCEDURE — A9270 NON-COVERED ITEM OR SERVICE: HCPCS | Performed by: INTERNAL MEDICINE

## 2019-12-29 PROCEDURE — 700102 HCHG RX REV CODE 250 W/ 637 OVERRIDE(OP): Performed by: INTERNAL MEDICINE

## 2019-12-29 PROCEDURE — 85027 COMPLETE CBC AUTOMATED: CPT

## 2019-12-29 PROCEDURE — 770006 HCHG ROOM/CARE - MED/SURG/GYN SEMI*

## 2019-12-29 PROCEDURE — 700111 HCHG RX REV CODE 636 W/ 250 OVERRIDE (IP): Performed by: INTERNAL MEDICINE

## 2019-12-29 PROCEDURE — 80048 BASIC METABOLIC PNL TOTAL CA: CPT

## 2019-12-29 PROCEDURE — 700105 HCHG RX REV CODE 258: Performed by: HOSPITALIST

## 2019-12-29 PROCEDURE — 700102 HCHG RX REV CODE 250 W/ 637 OVERRIDE(OP): Performed by: HOSPITALIST

## 2019-12-29 PROCEDURE — 99233 SBSQ HOSP IP/OBS HIGH 50: CPT | Performed by: INTERNAL MEDICINE

## 2019-12-29 PROCEDURE — A9270 NON-COVERED ITEM OR SERVICE: HCPCS | Performed by: HOSPITALIST

## 2019-12-29 PROCEDURE — 80202 ASSAY OF VANCOMYCIN: CPT

## 2019-12-29 PROCEDURE — 700105 HCHG RX REV CODE 258: Performed by: INTERNAL MEDICINE

## 2019-12-29 PROCEDURE — 36415 COLL VENOUS BLD VENIPUNCTURE: CPT

## 2019-12-29 PROCEDURE — 700111 HCHG RX REV CODE 636 W/ 250 OVERRIDE (IP): Performed by: HOSPITALIST

## 2019-12-29 RX ORDER — OXYCODONE HYDROCHLORIDE 5 MG/1
5 TABLET ORAL EVERY 4 HOURS PRN
Status: DISCONTINUED | OUTPATIENT
Start: 2019-12-29 | End: 2020-01-02 | Stop reason: HOSPADM

## 2019-12-29 RX ADMIN — AMPICILLIN SODIUM AND SULBACTAM SODIUM 3 G: 2; 1 INJECTION, POWDER, FOR SOLUTION INTRAMUSCULAR; INTRAVENOUS at 03:31

## 2019-12-29 RX ADMIN — OXYCODONE HYDROCHLORIDE AND ACETAMINOPHEN 500 MG: 500 TABLET ORAL at 17:23

## 2019-12-29 RX ADMIN — OXYCODONE HYDROCHLORIDE AND ACETAMINOPHEN 500 MG: 500 TABLET ORAL at 05:10

## 2019-12-29 RX ADMIN — PROPRANOLOL HYDROCHLORIDE 10 MG: 10 TABLET ORAL at 05:10

## 2019-12-29 RX ADMIN — TRAMADOL HYDROCHLORIDE 50 MG: 50 TABLET, FILM COATED ORAL at 09:06

## 2019-12-29 RX ADMIN — ASPIRIN 81 MG: 81 TABLET, COATED ORAL at 05:10

## 2019-12-29 RX ADMIN — FERROUS GLUCONATE 324 MG: 324 TABLET ORAL at 17:23

## 2019-12-29 RX ADMIN — ATORVASTATIN CALCIUM 40 MG: 40 TABLET, FILM COATED ORAL at 20:31

## 2019-12-29 RX ADMIN — CYANOCOBALAMIN TAB 500 MCG 1000 MCG: 500 TAB at 05:10

## 2019-12-29 RX ADMIN — POLYETHYLENE GLYCOL 3350 1 PACKET: 17 POWDER, FOR SOLUTION ORAL at 17:23

## 2019-12-29 RX ADMIN — AMLODIPINE BESYLATE 10 MG: 10 TABLET ORAL at 05:10

## 2019-12-29 RX ADMIN — AMPICILLIN SODIUM AND SULBACTAM SODIUM 3 G: 2; 1 INJECTION, POWDER, FOR SOLUTION INTRAMUSCULAR; INTRAVENOUS at 09:06

## 2019-12-29 RX ADMIN — THERA TABS 1 TABLET: TAB at 05:11

## 2019-12-29 RX ADMIN — PROPRANOLOL HYDROCHLORIDE 10 MG: 10 TABLET ORAL at 17:23

## 2019-12-29 RX ADMIN — CITALOPRAM HYDROBROMIDE 20 MG: 20 TABLET ORAL at 05:10

## 2019-12-29 RX ADMIN — PIPERACILLIN AND TAZOBACTAM 3.38 G: 3; .375 INJECTION, POWDER, LYOPHILIZED, FOR SOLUTION INTRAVENOUS; PARENTERAL at 20:32

## 2019-12-29 RX ADMIN — OXYCODONE HYDROCHLORIDE 5 MG: 5 TABLET ORAL at 12:28

## 2019-12-29 RX ADMIN — VANCOMYCIN HYDROCHLORIDE 1000 MG: 500 INJECTION, POWDER, LYOPHILIZED, FOR SOLUTION INTRAVENOUS at 11:36

## 2019-12-29 RX ADMIN — FERROUS GLUCONATE 324 MG: 324 TABLET ORAL at 09:06

## 2019-12-29 RX ADMIN — PIPERACILLIN AND TAZOBACTAM 3.38 G: 3; .375 INJECTION, POWDER, LYOPHILIZED, FOR SOLUTION INTRAVENOUS; PARENTERAL at 12:04

## 2019-12-29 RX ADMIN — HEPARIN SODIUM 5000 UNITS: 5000 INJECTION, SOLUTION INTRAVENOUS; SUBCUTANEOUS at 05:10

## 2019-12-29 RX ADMIN — OXYCODONE HYDROCHLORIDE 5 MG: 5 TABLET ORAL at 17:24

## 2019-12-29 RX ADMIN — PIPERACILLIN AND TAZOBACTAM 3.38 G: 3; .375 INJECTION, POWDER, LYOPHILIZED, FOR SOLUTION INTRAVENOUS; PARENTERAL at 15:47

## 2019-12-29 RX ADMIN — OXYCODONE HYDROCHLORIDE 5 MG: 5 TABLET ORAL at 21:56

## 2019-12-29 ASSESSMENT — ENCOUNTER SYMPTOMS
TINGLING: 0
FEVER: 0
HEARTBURN: 0
PND: 0
FALLS: 0
LOSS OF CONSCIOUSNESS: 0
BLURRED VISION: 0
NECK PAIN: 0
ABDOMINAL PAIN: 0
TREMORS: 0
COUGH: 0
DIZZINESS: 0
MYALGIAS: 0
FOCAL WEAKNESS: 0
BLOOD IN STOOL: 0
SPEECH CHANGE: 0
WEIGHT LOSS: 0
WHEEZING: 0
PALPITATIONS: 0
WEAKNESS: 0
DIARRHEA: 0
HEADACHES: 0
HEMOPTYSIS: 0
DEPRESSION: 0
SENSORY CHANGE: 0
VOMITING: 0
CONSTIPATION: 0
SPUTUM PRODUCTION: 0
SEIZURES: 0
SHORTNESS OF BREATH: 0
CHILLS: 0
BACK PAIN: 0
DIAPHORESIS: 0
NAUSEA: 0
DOUBLE VISION: 0
FLANK PAIN: 0

## 2019-12-29 ASSESSMENT — LIFESTYLE VARIABLES: SUBSTANCE_ABUSE: 0

## 2019-12-29 NOTE — PROGRESS NOTES
"Pharmacy Kinetics 64 y.o. male on vancomycin day # 4      2019    Currently on Vancomycin 1000 mg iv q12hr (1200 0000)  Provider specified end date: TBD    Indication for Treatment: SSTI    Pertinent history per medical record: Admitted on 2019 for Cellulitis and abscess of right leg. 65 y/o m w/ PMHx CHF, hypertension, anxiety, who presents with right leg pain and swelling. Patient followed by wound care.  PTA pt recently noticed lower extremity blisters and increasing pain, fluid-filled blisters are yellow pus containing.       Other antibiotics: Unasyn 3g iv q6hr      Allergies: Norco [hydrocodone-acetaminophen]      List concerns for renal function:  BUN/SCr ratio > 20:1, CHF,CKD III.      Pertinent cultures to date:   19:PBCx2:NGTD  19:Lt Lg,WND: Streptococcus agalactiae (Group B)     MRSA nares swab if pneumonia is a concern (ordered/positive/negative/n-a): NA    Recent Labs     193 19  0334 19  0049   WBC 7.0 6.8 5.2 5.6   NEUTSPOLYS 73.50* 65.40  --   --      Recent Labs     19  0243 19  0334 19  0049   BUN 40* 31* 23* 17   CREATININE 1.13 0.91 1.03 0.92   ALBUMIN  --  3.4 3.2  --      Recent Labs     19  1027   VANCOTROUGH 18.3       Intake/Output Summary (Last 24 hours) at 2019 0834  Last data filed at 2019 0600  Gross per 24 hour   Intake 1170 ml   Output 4075 ml   Net -2905 ml      /93   Pulse 65   Temp 37.2 °C (99 °F) (Temporal)   Resp 16   Ht 1.803 m (5' 11\")   Wt 81.1 kg (178 lb 12.7 oz)   SpO2 91%  Temp (24hrs), Av.9 °C (98.4 °F), Min:36.6 °C (97.8 °F), Max:37.2 °C (99 °F)      A/P   1. Vancomycin dose change: No change   2. Next vancomycin level: 19@1130  3. Goal trough: 12-16 mcg/mL   4. Comments: No leukocytosis , afebrile. Cx above. Renal indices elevated , vancomycin maintenance dose reduced and follow up level ordered. Level today , continue current dose. " Consider abx de-escalation when clinically warranted.     Marko Palacios PharmD BCPS

## 2019-12-29 NOTE — PROGRESS NOTES
Care assumed from KEN Venegas @ 0700. Pt A/Ox4. Pt resting in bed. Assessment complete. Meds administered per MAR. Bed lowered and locked. Call light within reach. Pt instructed to call for help. Bed alarm not indicated. Hourly rounding complete. Continue to monitor.

## 2019-12-29 NOTE — CARE PLAN
Problem: Communication  Goal: The ability to communicate needs accurately and effectively will improve  Outcome: PROGRESSING AS EXPECTED  Intervention: [place] patient and significant other/support system to call light to alert staff of needs  Flowsheets (Taken 12/28/2019 9046)  Oriented to:: All of the Following : Location of Bathroom, Visiting Policy, Unit Routine, Call Light and Bedside Controls, Bedside Rail Policy, Smoking Policy, Rights and Responsibilities, Bedside Report, and Patient Education Notebook  Note:   Patient uses call light appropriately.      Problem: Safety  Goal: Will remain free from injury  Outcome: PROGRESSING AS EXPECTED  Intervention: Provide assistance with mobility  Flowsheets (Taken 12/27/2019 2051 by Piper Dickerson RIvoneN.)  Ambulation Tolerance: Tolerates Well  Note:   Patient has remained free from injury this shift.

## 2019-12-29 NOTE — PROGRESS NOTES
"Bedside report completed with day shift RN Mihaela and patient. Fall precautions in place and assessment competed. Assumed patient care at 1900. Patient vital signs /85   Pulse 60   Temp 36.6 °C (97.8 °F) (Temporal)   Resp 20   Ht 1.803 m (5' 11\")   Wt 81.1 kg (178 lb 12.7 oz)   SpO2 94%   BMI 24.94 kg/m² . Will continue to monitor.   "

## 2019-12-29 NOTE — PROGRESS NOTES
[facility] Medicine Daily Progress Note    Date of Service  12/28/2019    Chief Complaint  64 y.o. male admitted 12/26/2019 with Leg pain / Blister     Hospital Course    Patient presented to the hospital with complaints of leg pain, blisters, worsening erythema.  Reports discoloration of the skin of the legs for the last 3 years.  Has previous known history of cellulitis of his lower extremities.      Interval Problem Update  Patient seen and evaluated on rounds  Discussed with nursing staff on rounds  Persistent erythema, swelling of lower extremities significantly improved compared to yesterday  More profound in the left lower extremity  Blisters, bullous formation of the left lower extremity, some of these have drained  Intermittent pain reported by the patient  No fever or chills  No other issues/complaints status  Echo pending  De-escalate vancomycin tomorrow    Consultants/Specialty  None    Code Status  Full code    Disposition  Anticipate home once acute issues are resolved    Review of Systems  Review of Systems   Constitutional: Positive for malaise/fatigue. Negative for chills, diaphoresis, fever and weight loss.   HENT: Negative for hearing loss and tinnitus.    Eyes: Negative for blurred vision and double vision.   Respiratory: Negative for cough, hemoptysis, sputum production, shortness of breath and wheezing.    Cardiovascular: Positive for leg swelling. Negative for chest pain, palpitations and PND.   Gastrointestinal: Negative for abdominal pain, blood in stool, constipation, diarrhea, heartburn, melena, nausea and vomiting.   Genitourinary: Negative for dysuria, flank pain, frequency, hematuria and urgency.   Musculoskeletal: Negative for back pain, falls, joint pain, myalgias and neck pain.   Skin: Positive for rash. Negative for itching.        Leg cellulitis, bullae/blister formation   Neurological: Negative for dizziness, tingling, tremors, sensory change, speech change, focal weakness,  seizures, loss of consciousness, weakness and headaches.   Psychiatric/Behavioral: Negative for depression, substance abuse and suicidal ideas.        Physical Exam  Temp:  [36.7 °C (98 °F)-37.2 °C (98.9 °F)] 36.8 °C (98.3 °F)  Pulse:  [60-68] 64  Resp:  [17-18] 17  BP: (116-149)/(68-92) 122/91  SpO2:  [90 %-96 %] 91 %    Physical Exam  HENT:      Head: Normocephalic.      Right Ear: External ear normal.      Left Ear: External ear normal.      Nose: Nose normal.      Mouth/Throat:      Mouth: Mucous membranes are moist.   Eyes:      General: No scleral icterus.     Conjunctiva/sclera: Conjunctivae normal.      Pupils: Pupils are equal, round, and reactive to light.   Neck:      Musculoskeletal: Normal range of motion and neck supple.   Cardiovascular:      Rate and Rhythm: Normal rate and regular rhythm.      Pulses: Normal pulses.      Heart sounds: Murmur present. No friction rub. No gallop.       Comments: Aortic area systolic murmur, apical systolic murmur  Pulmonary:      Effort: Pulmonary effort is normal. No respiratory distress.      Breath sounds: Normal breath sounds. No stridor. No wheezing or rhonchi.   Abdominal:      General: Abdomen is flat. Bowel sounds are normal. There is no distension.      Palpations: There is no mass.      Tenderness: There is no tenderness. There is no right CVA tenderness, left CVA tenderness, guarding or rebound.      Hernia: No hernia is present.   Musculoskeletal: Normal range of motion.      Right lower leg: Edema present.      Left lower leg: Edema present.   Skin:     General: Skin is warm and dry.      Capillary Refill: Capillary refill takes 2 to 3 seconds.      Coloration: Skin is not jaundiced.      Findings: Erythema and rash present.      Comments: Patient has bilateral lower extremity venous stasis dermatitis with bilateral lower extremity superimposed cellulitis, more profound in the left lower extremity.  Blister, bullous formation   Neurological:      General:  No focal deficit present.      Mental Status: He is alert and oriented to person, place, and time. Mental status is at baseline.   Psychiatric:         Mood and Affect: Mood normal.         Behavior: Behavior normal.         Thought Content: Thought content normal.         Judgment: Judgment normal.       Some of the blisters to the left lower extremity have drained, otherwise examination remains unchanged.  Improvement in erythema of the lower extremities is noted.    Fluids    Intake/Output Summary (Last 24 hours) at 12/28/2019 1741  Last data filed at 12/28/2019 1700  Gross per 24 hour   Intake 810 ml   Output 4400 ml   Net -3590 ml       Laboratory  Recent Labs     12/26/19 2025 12/27/19 0243 12/28/19  0334   WBC 7.0 6.8 5.2   RBC 3.93* 3.86* 3.77*   HEMOGLOBIN 12.2* 11.9* 11.7*   HEMATOCRIT 38.5* 38.4* 38.6*   MCV 98.0* 99.5* 102.4*   MCH 31.0 30.8 31.0   MCHC 31.7* 31.0* 30.3*   RDW 52.8* 54.0* 56.0*   PLATELETCT 288 253 254   MPV 9.1 9.1 9.5     Recent Labs     12/26/19 2025 12/27/19 0243 12/28/19  0334   SODIUM 143 141 138   POTASSIUM 4.4 4.3 4.9   CHLORIDE 110 111 108   CO2 25 23 25   GLUCOSE 100* 97 76   BUN 40* 31* 23*   CREATININE 1.13 0.91 1.03   CALCIUM 8.5 8.3* 8.5                   Imaging  EC-ECHOCARDIOGRAM COMPLETE W/O CONT         US-BHUPINDER SINGLE LEVEL BILAT   Final Result      CT-EXTREMITY, LOWER WITH LEFT   Final Result      Moderate to severe distal leg and medial ankle bullous change likely is infected.      Diffuse subcutaneous fat stranding and skin thickening could indicate bland edema or cellulitis and phlegmonous change.      No soft tissue gas or bony destruction is seen to suggest deep infection      CT-EXTREMITY, LOWER WITH RIGHT   Final Result      Diffuse subcutaneous fat stranding and skin thickening could indicate bland edema or cellulitis and phlegmonous change.      No abscess, soft tissue gas or bony destruction is seen to suggest infection      Healed tibia and fibula  fractures with antegrade intramedullary nail in place           Assessment/Plan  * Venous stasis dermatitis of both lower extremities- (present on admission)  Assessment & Plan  With superimposed cellulitis of bilateral lower extremities, more profound in the left lower extremity  Left lower extremity with bullous formation, blisters secondary to underlying infection  Arterial evaluation negative    At this time continue intravenous vancomycin  I am monitoring vancomycin toxicity and therapeutics  Continue gentle IV fluid hydration oral vancomycin to limit vancomycin induced renal toxicity    Cultures with strep, if cultures remain negative by tomorrow for any staphylococcal concerns, antibiotics will be de-escalated to intravenous Unasyn and oral doxycycline  Continue IV Unasyn    De-escalate antibiotics over the next 24 hours    Limb preservation service, wound team evaluations    Obtain echocardiogram, pending    Pain control with tramadol    Other hyperlipidemia- (present on admission)  Assessment & Plan  Continue atorvastatin     Macrocytic anemia- (present on admission)  Assessment & Plan  Likely anemia of chronic disease, component of minimal iron deficiency anemia  B12 lower limit of normal  Vitamin C, multivitamin, B12, iron supplementation initiated  Monitor Hb / Restrictive transfusion strategy    Essential hypertension- (present on admission)  Assessment & Plan  Continue propranolol       VTE prophylaxis: SC Heparin

## 2019-12-30 LAB
ANION GAP SERPL CALC-SCNC: 6 MMOL/L (ref 0–11.9)
BUN SERPL-MCNC: 21 MG/DL (ref 8–22)
CALCIUM SERPL-MCNC: 8.7 MG/DL (ref 8.5–10.5)
CHLORIDE SERPL-SCNC: 101 MMOL/L (ref 96–112)
CO2 SERPL-SCNC: 28 MMOL/L (ref 20–33)
CREAT SERPL-MCNC: 1.15 MG/DL (ref 0.5–1.4)
CRP SERPL HS-MCNC: 5.16 MG/DL (ref 0–0.75)
ERYTHROCYTE [DISTWIDTH] IN BLOOD BY AUTOMATED COUNT: 52.4 FL (ref 35.9–50)
ERYTHROCYTE [SEDIMENTATION RATE] IN BLOOD BY WESTERGREN METHOD: 55 MM/HOUR (ref 0–20)
GLUCOSE SERPL-MCNC: 110 MG/DL (ref 65–99)
HCT VFR BLD AUTO: 39.3 % (ref 42–52)
HGB BLD-MCNC: 12.3 G/DL (ref 14–18)
MCH RBC QN AUTO: 30.5 PG (ref 27–33)
MCHC RBC AUTO-ENTMCNC: 31.3 G/DL (ref 33.7–35.3)
MCV RBC AUTO: 97.5 FL (ref 81.4–97.8)
PLATELET # BLD AUTO: 278 K/UL (ref 164–446)
PMV BLD AUTO: 8.8 FL (ref 9–12.9)
POTASSIUM SERPL-SCNC: 4.8 MMOL/L (ref 3.6–5.5)
RBC # BLD AUTO: 4.03 M/UL (ref 4.7–6.1)
SODIUM SERPL-SCNC: 135 MMOL/L (ref 135–145)
WBC # BLD AUTO: 4.9 K/UL (ref 4.8–10.8)

## 2019-12-30 PROCEDURE — 36415 COLL VENOUS BLD VENIPUNCTURE: CPT

## 2019-12-30 PROCEDURE — 700111 HCHG RX REV CODE 636 W/ 250 OVERRIDE (IP): Performed by: HOSPITALIST

## 2019-12-30 PROCEDURE — 700102 HCHG RX REV CODE 250 W/ 637 OVERRIDE(OP): Performed by: INTERNAL MEDICINE

## 2019-12-30 PROCEDURE — 700111 HCHG RX REV CODE 636 W/ 250 OVERRIDE (IP): Performed by: INTERNAL MEDICINE

## 2019-12-30 PROCEDURE — 700105 HCHG RX REV CODE 258: Performed by: INTERNAL MEDICINE

## 2019-12-30 PROCEDURE — 80048 BASIC METABOLIC PNL TOTAL CA: CPT

## 2019-12-30 PROCEDURE — 86140 C-REACTIVE PROTEIN: CPT

## 2019-12-30 PROCEDURE — 97597 DBRDMT OPN WND 1ST 20 CM/<: CPT

## 2019-12-30 PROCEDURE — 700102 HCHG RX REV CODE 250 W/ 637 OVERRIDE(OP): Performed by: HOSPITALIST

## 2019-12-30 PROCEDURE — A9270 NON-COVERED ITEM OR SERVICE: HCPCS | Performed by: HOSPITALIST

## 2019-12-30 PROCEDURE — 85027 COMPLETE CBC AUTOMATED: CPT

## 2019-12-30 PROCEDURE — A9270 NON-COVERED ITEM OR SERVICE: HCPCS | Performed by: INTERNAL MEDICINE

## 2019-12-30 PROCEDURE — 770006 HCHG ROOM/CARE - MED/SURG/GYN SEMI*

## 2019-12-30 PROCEDURE — 85652 RBC SED RATE AUTOMATED: CPT

## 2019-12-30 PROCEDURE — 99233 SBSQ HOSP IP/OBS HIGH 50: CPT | Performed by: INTERNAL MEDICINE

## 2019-12-30 RX ORDER — SULFAMETHOXAZOLE AND TRIMETHOPRIM 800; 160 MG/1; MG/1
1 TABLET ORAL EVERY 12 HOURS
Status: DISCONTINUED | OUTPATIENT
Start: 2020-01-01 | End: 2020-01-01

## 2019-12-30 RX ORDER — CIPROFLOXACIN 500 MG/1
500 TABLET, FILM COATED ORAL EVERY 12 HOURS
Status: DISCONTINUED | OUTPATIENT
Start: 2020-01-01 | End: 2020-01-02 | Stop reason: HOSPADM

## 2019-12-30 RX ADMIN — PIPERACILLIN AND TAZOBACTAM 3.38 G: 3; .375 INJECTION, POWDER, LYOPHILIZED, FOR SOLUTION INTRAVENOUS; PARENTERAL at 13:50

## 2019-12-30 RX ADMIN — OXYCODONE HYDROCHLORIDE 5 MG: 5 TABLET ORAL at 17:42

## 2019-12-30 RX ADMIN — OXYCODONE HYDROCHLORIDE AND ACETAMINOPHEN 500 MG: 500 TABLET ORAL at 06:17

## 2019-12-30 RX ADMIN — PIPERACILLIN AND TAZOBACTAM 3.38 G: 3; .375 INJECTION, POWDER, LYOPHILIZED, FOR SOLUTION INTRAVENOUS; PARENTERAL at 20:27

## 2019-12-30 RX ADMIN — CITALOPRAM HYDROBROMIDE 20 MG: 20 TABLET ORAL at 04:53

## 2019-12-30 RX ADMIN — OXYCODONE HYDROCHLORIDE AND ACETAMINOPHEN 500 MG: 500 TABLET ORAL at 17:42

## 2019-12-30 RX ADMIN — OXYCODONE HYDROCHLORIDE 5 MG: 5 TABLET ORAL at 08:59

## 2019-12-30 RX ADMIN — PIPERACILLIN AND TAZOBACTAM 3.38 G: 3; .375 INJECTION, POWDER, LYOPHILIZED, FOR SOLUTION INTRAVENOUS; PARENTERAL at 04:52

## 2019-12-30 RX ADMIN — OXYCODONE HYDROCHLORIDE 5 MG: 5 TABLET ORAL at 22:14

## 2019-12-30 RX ADMIN — PROPRANOLOL HYDROCHLORIDE 10 MG: 10 TABLET ORAL at 17:42

## 2019-12-30 RX ADMIN — AMLODIPINE BESYLATE 10 MG: 10 TABLET ORAL at 04:53

## 2019-12-30 RX ADMIN — HEPARIN SODIUM 5000 UNITS: 5000 INJECTION, SOLUTION INTRAVENOUS; SUBCUTANEOUS at 13:52

## 2019-12-30 RX ADMIN — CYANOCOBALAMIN TAB 500 MCG 1000 MCG: 500 TAB at 04:52

## 2019-12-30 RX ADMIN — OXYCODONE HYDROCHLORIDE 5 MG: 5 TABLET ORAL at 04:59

## 2019-12-30 RX ADMIN — FERROUS GLUCONATE 324 MG: 324 TABLET ORAL at 10:09

## 2019-12-30 RX ADMIN — ASPIRIN 81 MG: 81 TABLET, COATED ORAL at 04:52

## 2019-12-30 RX ADMIN — OXYCODONE HYDROCHLORIDE 5 MG: 5 TABLET ORAL at 13:02

## 2019-12-30 RX ADMIN — ATORVASTATIN CALCIUM 40 MG: 40 TABLET, FILM COATED ORAL at 20:27

## 2019-12-30 RX ADMIN — THERA TABS 1 TABLET: TAB at 04:53

## 2019-12-30 RX ADMIN — FERROUS GLUCONATE 324 MG: 324 TABLET ORAL at 17:42

## 2019-12-30 RX ADMIN — PROPRANOLOL HYDROCHLORIDE 10 MG: 10 TABLET ORAL at 04:52

## 2019-12-30 ASSESSMENT — ENCOUNTER SYMPTOMS
PALPITATIONS: 0
DOUBLE VISION: 0
DEPRESSION: 0
DIAPHORESIS: 0
PND: 0
MYALGIAS: 0
FEVER: 0
BACK PAIN: 0
HEADACHES: 0
CHILLS: 0
WEIGHT LOSS: 0
WHEEZING: 0
ABDOMINAL PAIN: 0
SEIZURES: 0
FALLS: 0
DIZZINESS: 0
HEMOPTYSIS: 0
BLOOD IN STOOL: 0
LOSS OF CONSCIOUSNESS: 0
FOCAL WEAKNESS: 0
BLURRED VISION: 0
VOMITING: 0
NAUSEA: 0
SPUTUM PRODUCTION: 0
SHORTNESS OF BREATH: 0
DIARRHEA: 0
NECK PAIN: 0
SENSORY CHANGE: 0
TREMORS: 0
HEARTBURN: 0
WEAKNESS: 0
SPEECH CHANGE: 0
COUGH: 0
TINGLING: 0
FLANK PAIN: 0
CONSTIPATION: 0

## 2019-12-30 ASSESSMENT — LIFESTYLE VARIABLES: SUBSTANCE_ABUSE: 0

## 2019-12-30 NOTE — WOUND TEAM
"[facility]  Inpatient Services  Wound and Skin Care Progress Note    Admission Date:  12/26/2019   HPI, PMH, SH: Reviewed  Unit where seen by Wound Team: [id number]/[id number]    WOUND TEAM FOLLOW UP: change modified unna boot    SUBJECTIVE:   \"I was here a year ago.\"    Self Report / Pain Level: no c/o pain      OBJECTIVE: drsg intact, pt ambulating in room  WOUND TYPE, LOCATION, CHARACTERISTICS (Pressure ulcers: location, stage, POA or date identified)  Wound 12/27/19 Other (comment) Pretibial Multiple blisters throughout LLE (Active)         12/27/2019  1:10 AM   Site Assessment Red    Marylou-wound Assessment Yellow-brown;Edema    Margins Undefined edges    Tunneling 0 cm    Undermining 0 cm    Closure Secondary intention    Drainage Amount Small    Drainage Description Serosanguineous    Non-staged Wound Description Partial thickness    Treatments Cleansed    Cleansing Approved Wound Cleanser    Periwound Protectant Not Applicable    Dressing Options Viscopaste;Absorbent Abdominal Pad;Roll Gauze    Dressing Cleansing/Solutions Not Applicable    Dressing Changed Changed    Dressing Status Intact    Dressing Change Frequency Other (Comments) 2-3x/week    NEXT Dressing Change  01/01/20    NEXT Weekly Photo (Inpatient Only) 01/04/20    Odor None     Exposed Structures None     Tissue Type and Percentage 100% red        Vascular:  BHUPINDER 12/27/19 \"RIGHT      Waveform            Systolic BPs (mmHg)                              117           Brachial   Triphasic                                Common Femoral   Triphasic                  153           Posterior Tibial   Triphasic                  129           Dorsalis Pedis                                            Peroneal                              1.31          BHUPINDER                                            TBI                           LEFT   Waveform        Systolic BPs (mmHg)                              101           Brachial   Triphasic                           " "     Common Femoral   Biphasic                                 Posterior Tibial   Bi, non-                                 Dorsalis Pedis   reversed                                            Peroneal                                            BHUPINDER                                            TBI         Findings   Right.    Doppler waveform of the common femoral artery is of high amplitude and    triphasic.    Doppler waveforms at the ankle are brisk and triphasic.    Ankle-brachial index is normal.       Left.    Could not perform ankle-brachial index due to large blisters/ulcers.   Normal toe-brachial index: 0.94   Doppler waveform of the common femoral artery is of high amplitude and    triphasic.    Biphasic waveforms seen at the ankle.\"     Lab Values:    WBC:       Lab Results   Component Value Date/Time    WBC 4.9 12/30/2019 02:04 AM    RBC 4.03 (L) 12/30/2019 02:04 AM      AIC:      Lab Results   Component Value Date/Time    HBA1C 5.5 12/22/2018 12:36 AM          Culture:   Already Completed    INTERVENTIONS BY WOUND TEAM:removed drsg, cleaned wounds with wound cleanser, dried. Cleaned leg with soap and wtaer. Using forceps and scissors removed loose blistered skin <20cm^2. No c/o pain or bleeding. Cleaned wounds again. Applied viscopaste to leg, covered with abd pads, wrapped with roll gauze. Moisturizer to RLE and dorsal foot, making sure not to apply to plantar feet.   Dressing Options: Viscopaste, Absorbent Abdominal Pad, Roll Gauze    Interdisciplinary consultation:   With Nursing;With Patient    EVALUATION: pt's wounds with decreased drainage, more red tissue when compared to pictures. Continuing current Tx    Factors affecting wound healing: venous stasis, HTN, anemia  Goals:  Slow steady decrease in wound area and depth weekly    NURSING PLAN OF CARE:    Dressing changes: Continue previous Dressing Maintenance orders: x       See new Dressing Maintenance orders:       Skin care: See Skin Care orders:   "      Rectal tube care: See Rectal Tube Care orders:      Other orders:           WOUND TEAM PLAN OF CARE (X):   NPWT change 3 x week:        Dressing changes:    x   Follow up as needed:       Other:    Anticipated discharge plans (X):  SNF:           Home Care:   Or wound clinic may need ongoing care for LLE        Outpatient Wound Center:            Self Care:            Other:

## 2019-12-30 NOTE — CARE PLAN
Problem: Communication  Goal: The ability to communicate needs accurately and effectively will improve  Outcome: PROGRESSING AS EXPECTED  Intervention: George patient and significant other/support system to call light to alert staff of needs  Flowsheets (Taken 12/28/2019 1385)  Oriented to:: All of the Following : Location of Bathroom, Visiting Policy, Unit Routine, Call Light and Bedside Controls, Bedside Rail Policy, Smoking Policy, Rights and Responsibilities, Bedside Report, and Patient Education Notebook     Problem: Discharge Barriers/Planning  Goal: Patient's continuum of care needs will be met  Outcome: PROGRESSING AS EXPECTED

## 2019-12-30 NOTE — CARE PLAN
Problem: Safety  Goal: Will remain free from falls  Note:   Bed inlowest and locked position, non-skid socks in place, call light in reach, hourly rounding in place.     Problem: Knowledge Deficit  Goal: Knowledge of disease process/condition, treatment plan, diagnostic tests, and medications will improve  Note:   Patient educated about prescribed medication.

## 2019-12-30 NOTE — PROGRESS NOTES
"Bedside rounding completed with day shift RN Mihaela and patient. Fall precautions in place and hourly rounding continued. Assumed patient care at 1900. Patient vital signs /79   Pulse (!) 50 Comment: Adwoa notified  Temp 36.6 °C (97.8 °F) (Temporal)   Resp 18   Ht 1.803 m (5' 11\")   Wt 81.1 kg (178 lb 12.7 oz)   SpO2 93%   BMI 24.94 kg/m² . Will continue to monitor.   "

## 2019-12-30 NOTE — PROGRESS NOTES
Received report from night RN, assumed care at 0700. Pt A&OX4, able to specify needs. Pt sitting up in bed, no signs/symptoms of distress noted. Pt's LLE with dressing, CDI. Fall precautions in place. POC discussed, communication board updated. Call light in reach, hourly rounding in place.

## 2019-12-31 PROBLEM — L03.116 LEFT LEG CELLULITIS: Status: ACTIVE | Noted: 2019-12-31

## 2019-12-31 LAB
ANION GAP SERPL CALC-SCNC: 9 MMOL/L (ref 0–11.9)
BACTERIA BLD CULT: NORMAL
BACTERIA BLD CULT: NORMAL
BUN SERPL-MCNC: 25 MG/DL (ref 8–22)
CALCIUM SERPL-MCNC: 8.8 MG/DL (ref 8.5–10.5)
CHLORIDE SERPL-SCNC: 103 MMOL/L (ref 96–112)
CO2 SERPL-SCNC: 24 MMOL/L (ref 20–33)
CREAT SERPL-MCNC: 1.42 MG/DL (ref 0.5–1.4)
EKG IMPRESSION: NORMAL
ERYTHROCYTE [DISTWIDTH] IN BLOOD BY AUTOMATED COUNT: 50.6 FL (ref 35.9–50)
GLUCOSE SERPL-MCNC: 93 MG/DL (ref 65–99)
HCT VFR BLD AUTO: 42.2 % (ref 42–52)
HGB BLD-MCNC: 13.5 G/DL (ref 14–18)
MCH RBC QN AUTO: 30.8 PG (ref 27–33)
MCHC RBC AUTO-ENTMCNC: 32 G/DL (ref 33.7–35.3)
MCV RBC AUTO: 96.3 FL (ref 81.4–97.8)
PLATELET # BLD AUTO: 296 K/UL (ref 164–446)
PMV BLD AUTO: 8.7 FL (ref 9–12.9)
POTASSIUM SERPL-SCNC: 5 MMOL/L (ref 3.6–5.5)
RBC # BLD AUTO: 4.38 M/UL (ref 4.7–6.1)
SIGNIFICANT IND 70042: NORMAL
SIGNIFICANT IND 70042: NORMAL
SITE SITE: NORMAL
SITE SITE: NORMAL
SODIUM SERPL-SCNC: 136 MMOL/L (ref 135–145)
SOURCE SOURCE: NORMAL
SOURCE SOURCE: NORMAL
WBC # BLD AUTO: 6.3 K/UL (ref 4.8–10.8)

## 2019-12-31 PROCEDURE — 85027 COMPLETE CBC AUTOMATED: CPT

## 2019-12-31 PROCEDURE — 770006 HCHG ROOM/CARE - MED/SURG/GYN SEMI*

## 2019-12-31 PROCEDURE — A9270 NON-COVERED ITEM OR SERVICE: HCPCS | Performed by: INTERNAL MEDICINE

## 2019-12-31 PROCEDURE — 700111 HCHG RX REV CODE 636 W/ 250 OVERRIDE (IP): Performed by: INTERNAL MEDICINE

## 2019-12-31 PROCEDURE — 99232 SBSQ HOSP IP/OBS MODERATE 35: CPT | Performed by: HOSPITALIST

## 2019-12-31 PROCEDURE — 36415 COLL VENOUS BLD VENIPUNCTURE: CPT

## 2019-12-31 PROCEDURE — 700111 HCHG RX REV CODE 636 W/ 250 OVERRIDE (IP): Performed by: HOSPITALIST

## 2019-12-31 PROCEDURE — A9270 NON-COVERED ITEM OR SERVICE: HCPCS | Performed by: HOSPITALIST

## 2019-12-31 PROCEDURE — 93005 ELECTROCARDIOGRAM TRACING: CPT | Performed by: INTERNAL MEDICINE

## 2019-12-31 PROCEDURE — 700105 HCHG RX REV CODE 258: Performed by: INTERNAL MEDICINE

## 2019-12-31 PROCEDURE — 700102 HCHG RX REV CODE 250 W/ 637 OVERRIDE(OP): Performed by: HOSPITALIST

## 2019-12-31 PROCEDURE — 80048 BASIC METABOLIC PNL TOTAL CA: CPT

## 2019-12-31 PROCEDURE — 700102 HCHG RX REV CODE 250 W/ 637 OVERRIDE(OP): Performed by: INTERNAL MEDICINE

## 2019-12-31 PROCEDURE — 93010 ELECTROCARDIOGRAM REPORT: CPT | Performed by: INTERNAL MEDICINE

## 2019-12-31 RX ADMIN — HEPARIN SODIUM 5000 UNITS: 5000 INJECTION, SOLUTION INTRAVENOUS; SUBCUTANEOUS at 21:43

## 2019-12-31 RX ADMIN — HEPARIN SODIUM 5000 UNITS: 5000 INJECTION, SOLUTION INTRAVENOUS; SUBCUTANEOUS at 14:49

## 2019-12-31 RX ADMIN — FERROUS GLUCONATE 324 MG: 324 TABLET ORAL at 17:25

## 2019-12-31 RX ADMIN — OXYCODONE HYDROCHLORIDE 5 MG: 5 TABLET ORAL at 17:28

## 2019-12-31 RX ADMIN — ASPIRIN 81 MG: 81 TABLET, COATED ORAL at 04:50

## 2019-12-31 RX ADMIN — OXYCODONE HYDROCHLORIDE 5 MG: 5 TABLET ORAL at 10:33

## 2019-12-31 RX ADMIN — OXYCODONE HYDROCHLORIDE AND ACETAMINOPHEN 500 MG: 500 TABLET ORAL at 04:50

## 2019-12-31 RX ADMIN — PIPERACILLIN AND TAZOBACTAM 3.38 G: 3; .375 INJECTION, POWDER, LYOPHILIZED, FOR SOLUTION INTRAVENOUS; PARENTERAL at 12:36

## 2019-12-31 RX ADMIN — FERROUS GLUCONATE 324 MG: 324 TABLET ORAL at 08:11

## 2019-12-31 RX ADMIN — OXYCODONE HYDROCHLORIDE 5 MG: 5 TABLET ORAL at 21:46

## 2019-12-31 RX ADMIN — CITALOPRAM HYDROBROMIDE 20 MG: 20 TABLET ORAL at 04:50

## 2019-12-31 RX ADMIN — AMLODIPINE BESYLATE 10 MG: 10 TABLET ORAL at 04:50

## 2019-12-31 RX ADMIN — THERA TABS 1 TABLET: TAB at 04:50

## 2019-12-31 RX ADMIN — PIPERACILLIN AND TAZOBACTAM 3.38 G: 3; .375 INJECTION, POWDER, LYOPHILIZED, FOR SOLUTION INTRAVENOUS; PARENTERAL at 21:43

## 2019-12-31 RX ADMIN — PROPRANOLOL HYDROCHLORIDE 10 MG: 10 TABLET ORAL at 04:50

## 2019-12-31 RX ADMIN — CYANOCOBALAMIN TAB 500 MCG 1000 MCG: 500 TAB at 04:50

## 2019-12-31 RX ADMIN — OXYCODONE HYDROCHLORIDE 5 MG: 5 TABLET ORAL at 04:55

## 2019-12-31 RX ADMIN — OXYCODONE HYDROCHLORIDE AND ACETAMINOPHEN 500 MG: 500 TABLET ORAL at 17:25

## 2019-12-31 RX ADMIN — PIPERACILLIN AND TAZOBACTAM 3.38 G: 3; .375 INJECTION, POWDER, LYOPHILIZED, FOR SOLUTION INTRAVENOUS; PARENTERAL at 05:00

## 2019-12-31 RX ADMIN — ATORVASTATIN CALCIUM 40 MG: 40 TABLET, FILM COATED ORAL at 21:43

## 2019-12-31 ASSESSMENT — ENCOUNTER SYMPTOMS
HEADACHES: 0
BLURRED VISION: 0
DOUBLE VISION: 0
DIZZINESS: 0
HEMOPTYSIS: 0
PALPITATIONS: 0
BACK PAIN: 0
COUGH: 0
TINGLING: 0
MYALGIAS: 0
PND: 0
WEIGHT LOSS: 0
FEVER: 0
CHILLS: 0
FALLS: 0
TREMORS: 0
DEPRESSION: 0
CONSTIPATION: 0
DIARRHEA: 0
NAUSEA: 0
WEAKNESS: 0
SPUTUM PRODUCTION: 0
DIAPHORESIS: 0
LOSS OF CONSCIOUSNESS: 0
NECK PAIN: 0
WHEEZING: 0
SHORTNESS OF BREATH: 0
SENSORY CHANGE: 0
BLOOD IN STOOL: 0
SEIZURES: 0
SPEECH CHANGE: 0
ABDOMINAL PAIN: 0
HEARTBURN: 0
VOMITING: 0
FLANK PAIN: 0
FOCAL WEAKNESS: 0

## 2019-12-31 ASSESSMENT — LIFESTYLE VARIABLES: SUBSTANCE_ABUSE: 0

## 2019-12-31 ASSESSMENT — PAIN SCALES - WONG BAKER
WONGBAKER_NUMERICALRESPONSE: DOESN'T HURT AT ALL
WONGBAKER_NUMERICALRESPONSE: HURTS JUST A LITTLE BIT
WONGBAKER_NUMERICALRESPONSE: HURTS A LITTLE MORE

## 2019-12-31 NOTE — PROGRESS NOTES
Assumed care of pt at 0745. Report received and bedside rounding completed with night RN. Pt is calm no SOB, or in any acute distress noted.    Dressing to LLE c/d/i pt c/o of little to no pain to BLE.     Fall precautions in place,  bed alarm. - . Bed locked. Communication board updated with POC. Call light and pt belongings within reach - pt makes needs known - hourly rounding in place. See flowsheets for further assessment.

## 2019-12-31 NOTE — CARE PLAN
Problem: Communication  Goal: The ability to communicate needs accurately and effectively will improve  Outcome: PROGRESSING AS EXPECTED  Intervention: Carlos patient and significant other/support system to call light to alert staff of needs  Flowsheets (Taken 12/28/2019 6069)  Oriented to:: All of the Following : Location of Bathroom, Visiting Policy, Unit Routine, Call Light and Bedside Controls, Bedside Rail Policy, Smoking Policy, Rights and Responsibilities, Bedside Report, and Patient Education Notebook     Problem: Safety  Goal: Will remain free from injury  Outcome: PROGRESSING AS EXPECTED

## 2019-12-31 NOTE — PROGRESS NOTES
[facility] Medicine Daily Progress Note    Date of Service  12/30/2019    Chief Complaint  64 y.o. male admitted 12/26/2019 with Leg pain / Blister     Hospital Course    Patient presented to the hospital with complaints of leg pain, blisters, worsening erythema.  Reports discoloration of the skin of the legs for the last 3 years.  Has previous known history of cellulitis of his lower extremities.      Interval Problem Update  Patient seen and evaluated on rounds  Discussed with nursing staff on rounds  Improving cellulitis   Wound team evaluating   On IV Zosyn, further de-escalation requested  Will need close outpatient PCP follow-up, wound care    Consultants/Specialty  None    Code Status  Full code    Disposition  Anticipate home once acute issues are resolved  Will need arrangement of outpatient wound care, discussed with case management/social workers    Review of Systems  Review of Systems   Constitutional: Positive for malaise/fatigue. Negative for chills, diaphoresis, fever and weight loss.   HENT: Negative for hearing loss and tinnitus.    Eyes: Negative for blurred vision and double vision.   Respiratory: Negative for cough, hemoptysis, sputum production, shortness of breath and wheezing.    Cardiovascular: Positive for leg swelling. Negative for chest pain, palpitations and PND.   Gastrointestinal: Negative for abdominal pain, blood in stool, constipation, diarrhea, heartburn, melena, nausea and vomiting.   Genitourinary: Negative for dysuria, flank pain, frequency, hematuria and urgency.   Musculoskeletal: Negative for back pain, falls, joint pain, myalgias and neck pain.   Skin: Positive for rash. Negative for itching.        Leg cellulitis, bullae/blister formation   Neurological: Negative for dizziness, tingling, tremors, sensory change, speech change, focal weakness, seizures, loss of consciousness, weakness and headaches.   Psychiatric/Behavioral: Negative for depression, substance abuse and suicidal  ideas.        Physical Exam  Temp:  [36.6 °C (97.8 °F)-37.4 °C (99.3 °F)] 37.4 °C (99.3 °F)  Pulse:  [50-60] 60  Resp:  [18] 18  BP: (113-133)/(78-87) 119/78  SpO2:  [93 %-95 %] 93 %    Physical Exam  HENT:      Head: Normocephalic.      Right Ear: External ear normal.      Left Ear: External ear normal.      Nose: Nose normal.      Mouth/Throat:      Mouth: Mucous membranes are moist.   Eyes:      General: No scleral icterus.     Conjunctiva/sclera: Conjunctivae normal.      Pupils: Pupils are equal, round, and reactive to light.   Neck:      Musculoskeletal: Normal range of motion and neck supple.   Cardiovascular:      Rate and Rhythm: Normal rate and regular rhythm.      Pulses: Normal pulses.      Heart sounds: Murmur present. No friction rub. No gallop.       Comments: Aortic area systolic murmur, apical systolic murmur  Pulmonary:      Effort: Pulmonary effort is normal. No respiratory distress.      Breath sounds: Normal breath sounds. No stridor. No wheezing or rhonchi.   Abdominal:      General: Abdomen is flat. Bowel sounds are normal. There is no distension.      Palpations: There is no mass.      Tenderness: There is no tenderness. There is no right CVA tenderness, left CVA tenderness, guarding or rebound.      Hernia: No hernia is present.   Musculoskeletal: Normal range of motion.      Right lower leg: Edema present.      Left lower leg: Edema present.   Skin:     General: Skin is warm and dry.      Capillary Refill: Capillary refill takes 2 to 3 seconds.      Coloration: Skin is not jaundiced.      Findings: Erythema and rash present.      Comments: Patient has bilateral lower extremity venous stasis dermatitis with bilateral lower extremity superimposed cellulitis, more profound in the left lower extremity.  Blister, bullous formation   Neurological:      General: No focal deficit present.      Mental Status: He is alert and oriented to person, place, and time. Mental status is at baseline.    Psychiatric:         Mood and Affect: Mood normal.         Behavior: Behavior normal.         Thought Content: Thought content normal.         Judgment: Judgment normal.       Ongoing improvement in cellulitis compared to yesterday    Fluids    Intake/Output Summary (Last 24 hours) at 12/30/2019 1703  Last data filed at 12/30/2019 1000  Gross per 24 hour   Intake 440 ml   Output 1925 ml   Net -1485 ml       Laboratory  Recent Labs     12/28/19  0334 12/29/19 0049 12/30/19  0204   WBC 5.2 5.6 4.9   RBC 3.77* 3.89* 4.03*   HEMOGLOBIN 11.7* 12.0* 12.3*   HEMATOCRIT 38.6* 37.6* 39.3*   .4* 96.7 97.5   MCH 31.0 30.8 30.5   MCHC 30.3* 31.9* 31.3*   RDW 56.0* 51.1* 52.4*   PLATELETCT 254 278 278   MPV 9.5 8.8* 8.8*     Recent Labs     12/28/19 0334 12/29/19 0049 12/30/19  0204   SODIUM 138 137 135   POTASSIUM 4.9 4.6 4.8   CHLORIDE 108 104 101   CO2 25 27 28   GLUCOSE 76 77 110*   BUN 23* 17 21   CREATININE 1.03 0.92 1.15   CALCIUM 8.5 8.5 8.7                   Imaging  EC-ECHOCARDIOGRAM COMPLETE W/O CONT   Final Result      US-BHUPINDER SINGLE LEVEL BILAT   Final Result      CT-EXTREMITY, LOWER WITH LEFT   Final Result      Moderate to severe distal leg and medial ankle bullous change likely is infected.      Diffuse subcutaneous fat stranding and skin thickening could indicate bland edema or cellulitis and phlegmonous change.      No soft tissue gas or bony destruction is seen to suggest deep infection      CT-EXTREMITY, LOWER WITH RIGHT   Final Result      Diffuse subcutaneous fat stranding and skin thickening could indicate bland edema or cellulitis and phlegmonous change.      No abscess, soft tissue gas or bony destruction is seen to suggest infection      Healed tibia and fibula fractures with antegrade intramedullary nail in place           Assessment/Plan  * Venous stasis dermatitis of both lower extremities- (present on admission)  Assessment & Plan  With superimposed cellulitis of bilateral lower  extremities, more profound in the left lower extremity  Left lower extremity with bullous formation, blisters secondary to underlying infection  Arterial evaluation negative    Wound cultures obtained from December 26, 2019 are growing polymicrobial organisms including Serratia, Pseudomonas and Streptococcus.  Serratia is sensitive to Bactrim, Pseudomonas is sensitive to Cipro.    I have personally reviewed the culture data    Continue IV Zosyn for another 6 doses  Then we will further de-escalate to oral ciprofloxacin/Bactrim, have put these orders in the EMR  Stop date and place  Interval EKG tomorrow to assess QTC before initiation of quinolones    Limb preservation service, wound team evaluations to continue     Pain control with oxycodone    Other hyperlipidemia- (present on admission)  Assessment & Plan  Continue atorvastatin     Macrocytic anemia- (present on admission)  Assessment & Plan  Likely anemia of chronic disease, component of minimal iron deficiency anemia  B12 lower limit of normal  Vitamin C, multivitamin, B12, iron supplementation initiated  Monitor Hb / Restrictive transfusion strategy    Essential hypertension- (present on admission)  Assessment & Plan  Continue propranolol       VTE prophylaxis: SC Heparin

## 2019-12-31 NOTE — CARE PLAN
Problem: Safety  Goal: Will remain free from injury  Note:   Bed locked and in lowest position. Hourly rounding in place. Call light with in reach.      Problem: Infection  Goal: Will remain free from infection  Intervention: Assess for removal of potential routes of infection, such as IV, central line, intra-arterial or urinary catheters  Note:   VSS pt remains afebrile and monitoring labs. Dressing to LLE c/d/I wound care changing dressing q48 hrs.

## 2019-12-31 NOTE — PROGRESS NOTES
"Bedside report taken from day shift KEN Araiaz with patient involvement. Patient fall precautions in place with hourly rounding continued. Patient refuses bed alarm. Assumed patient care at 1900. Patient vital signs /77   Pulse (!) 52 Comment: Nurse notified  Temp 37.1 °C (98.7 °F) (Temporal)   Resp 18   Ht 1.803 m (5' 11\")   Wt 81.1 kg (178 lb 12.7 oz)   SpO2 94%   BMI 24.94 kg/m² . Will continue to monitor.   "

## 2020-01-01 PROBLEM — N17.9 AKI (ACUTE KIDNEY INJURY) (HCC): Status: ACTIVE | Noted: 2020-01-01

## 2020-01-01 PROBLEM — T50.905A BRADYCARDIA, DRUG INDUCED: Status: ACTIVE | Noted: 2020-01-01

## 2020-01-01 PROBLEM — R00.1 BRADYCARDIA, DRUG INDUCED: Status: ACTIVE | Noted: 2020-01-01

## 2020-01-01 LAB
ANION GAP SERPL CALC-SCNC: 5 MMOL/L (ref 0–11.9)
BUN SERPL-MCNC: 30 MG/DL (ref 8–22)
CALCIUM SERPL-MCNC: 8.8 MG/DL (ref 8.5–10.5)
CHLORIDE SERPL-SCNC: 104 MMOL/L (ref 96–112)
CO2 SERPL-SCNC: 27 MMOL/L (ref 20–33)
CREAT SERPL-MCNC: 1.55 MG/DL (ref 0.5–1.4)
ERYTHROCYTE [DISTWIDTH] IN BLOOD BY AUTOMATED COUNT: 51.8 FL (ref 35.9–50)
GLUCOSE SERPL-MCNC: 89 MG/DL (ref 65–99)
HCT VFR BLD AUTO: 41 % (ref 42–52)
HGB BLD-MCNC: 12.9 G/DL (ref 14–18)
MCH RBC QN AUTO: 30.4 PG (ref 27–33)
MCHC RBC AUTO-ENTMCNC: 31.5 G/DL (ref 33.7–35.3)
MCV RBC AUTO: 96.7 FL (ref 81.4–97.8)
PLATELET # BLD AUTO: 313 K/UL (ref 164–446)
PMV BLD AUTO: 8.6 FL (ref 9–12.9)
POTASSIUM SERPL-SCNC: 5.1 MMOL/L (ref 3.6–5.5)
RBC # BLD AUTO: 4.24 M/UL (ref 4.7–6.1)
SODIUM SERPL-SCNC: 136 MMOL/L (ref 135–145)
WBC # BLD AUTO: 5.6 K/UL (ref 4.8–10.8)

## 2020-01-01 PROCEDURE — 85027 COMPLETE CBC AUTOMATED: CPT

## 2020-01-01 PROCEDURE — 700111 HCHG RX REV CODE 636 W/ 250 OVERRIDE (IP): Performed by: HOSPITALIST

## 2020-01-01 PROCEDURE — A9270 NON-COVERED ITEM OR SERVICE: HCPCS | Performed by: INTERNAL MEDICINE

## 2020-01-01 PROCEDURE — 36415 COLL VENOUS BLD VENIPUNCTURE: CPT

## 2020-01-01 PROCEDURE — 770006 HCHG ROOM/CARE - MED/SURG/GYN SEMI*

## 2020-01-01 PROCEDURE — 700102 HCHG RX REV CODE 250 W/ 637 OVERRIDE(OP): Performed by: INTERNAL MEDICINE

## 2020-01-01 PROCEDURE — A9270 NON-COVERED ITEM OR SERVICE: HCPCS | Performed by: HOSPITALIST

## 2020-01-01 PROCEDURE — 700102 HCHG RX REV CODE 250 W/ 637 OVERRIDE(OP): Performed by: HOSPITALIST

## 2020-01-01 PROCEDURE — 99232 SBSQ HOSP IP/OBS MODERATE 35: CPT | Performed by: HOSPITALIST

## 2020-01-01 PROCEDURE — 700105 HCHG RX REV CODE 258: Performed by: HOSPITALIST

## 2020-01-01 PROCEDURE — 700105 HCHG RX REV CODE 258: Performed by: INTERNAL MEDICINE

## 2020-01-01 PROCEDURE — 80048 BASIC METABOLIC PNL TOTAL CA: CPT

## 2020-01-01 PROCEDURE — 700111 HCHG RX REV CODE 636 W/ 250 OVERRIDE (IP): Performed by: INTERNAL MEDICINE

## 2020-01-01 RX ORDER — SODIUM CHLORIDE 9 MG/ML
500 INJECTION, SOLUTION INTRAVENOUS ONCE
Status: COMPLETED | OUTPATIENT
Start: 2020-01-01 | End: 2020-01-01

## 2020-01-01 RX ORDER — DOXYCYCLINE 100 MG/1
100 TABLET ORAL EVERY 12 HOURS
Status: DISCONTINUED | OUTPATIENT
Start: 2020-01-01 | End: 2020-01-02 | Stop reason: HOSPADM

## 2020-01-01 RX ADMIN — CIPROFLOXACIN HYDROCHLORIDE 500 MG: 500 TABLET, FILM COATED ORAL at 04:56

## 2020-01-01 RX ADMIN — OXYCODONE HYDROCHLORIDE AND ACETAMINOPHEN 500 MG: 500 TABLET ORAL at 04:56

## 2020-01-01 RX ADMIN — HEPARIN SODIUM 5000 UNITS: 5000 INJECTION, SOLUTION INTRAVENOUS; SUBCUTANEOUS at 21:15

## 2020-01-01 RX ADMIN — ATORVASTATIN CALCIUM 40 MG: 40 TABLET, FILM COATED ORAL at 21:15

## 2020-01-01 RX ADMIN — CITALOPRAM HYDROBROMIDE 20 MG: 20 TABLET ORAL at 04:56

## 2020-01-01 RX ADMIN — HEPARIN SODIUM 5000 UNITS: 5000 INJECTION, SOLUTION INTRAVENOUS; SUBCUTANEOUS at 15:04

## 2020-01-01 RX ADMIN — CYANOCOBALAMIN TAB 500 MCG 1000 MCG: 500 TAB at 04:56

## 2020-01-01 RX ADMIN — HEPARIN SODIUM 5000 UNITS: 5000 INJECTION, SOLUTION INTRAVENOUS; SUBCUTANEOUS at 04:56

## 2020-01-01 RX ADMIN — SULFAMETHOXAZOLE AND TRIMETHOPRIM 1 TABLET: 800; 160 TABLET ORAL at 04:56

## 2020-01-01 RX ADMIN — DOXYCYCLINE 100 MG: 100 TABLET, FILM COATED ORAL at 12:07

## 2020-01-01 RX ADMIN — PIPERACILLIN AND TAZOBACTAM 3.38 G: 3; .375 INJECTION, POWDER, LYOPHILIZED, FOR SOLUTION INTRAVENOUS; PARENTERAL at 04:48

## 2020-01-01 RX ADMIN — CIPROFLOXACIN HYDROCHLORIDE 500 MG: 500 TABLET, FILM COATED ORAL at 17:48

## 2020-01-01 RX ADMIN — FERROUS GLUCONATE 324 MG: 324 TABLET ORAL at 08:55

## 2020-01-01 RX ADMIN — POLYETHYLENE GLYCOL 3350 1 PACKET: 17 POWDER, FOR SOLUTION ORAL at 17:47

## 2020-01-01 RX ADMIN — PROPRANOLOL HYDROCHLORIDE 10 MG: 10 TABLET ORAL at 04:56

## 2020-01-01 RX ADMIN — FERROUS GLUCONATE 324 MG: 324 TABLET ORAL at 17:49

## 2020-01-01 RX ADMIN — ASPIRIN 81 MG: 81 TABLET, COATED ORAL at 04:56

## 2020-01-01 RX ADMIN — THERA TABS 1 TABLET: TAB at 04:56

## 2020-01-01 RX ADMIN — OXYCODONE HYDROCHLORIDE AND ACETAMINOPHEN 500 MG: 500 TABLET ORAL at 18:34

## 2020-01-01 RX ADMIN — DOXYCYCLINE 100 MG: 100 TABLET, FILM COATED ORAL at 21:15

## 2020-01-01 RX ADMIN — AMLODIPINE BESYLATE 10 MG: 10 TABLET ORAL at 04:56

## 2020-01-01 RX ADMIN — OXYCODONE HYDROCHLORIDE 5 MG: 5 TABLET ORAL at 21:15

## 2020-01-01 RX ADMIN — OXYCODONE HYDROCHLORIDE 5 MG: 5 TABLET ORAL at 15:17

## 2020-01-01 RX ADMIN — SODIUM CHLORIDE 500 ML: 9 INJECTION, SOLUTION INTRAVENOUS at 12:08

## 2020-01-01 RX ADMIN — OXYCODONE HYDROCHLORIDE 5 MG: 5 TABLET ORAL at 04:55

## 2020-01-01 RX ADMIN — OXYCODONE HYDROCHLORIDE 5 MG: 5 TABLET ORAL at 09:31

## 2020-01-01 ASSESSMENT — ENCOUNTER SYMPTOMS
TINGLING: 0
BACK PAIN: 0
DIARRHEA: 0
WEIGHT LOSS: 0
DIZZINESS: 0
WHEEZING: 0
NAUSEA: 0
FLANK PAIN: 0
SPUTUM PRODUCTION: 0
TREMORS: 0
HEMOPTYSIS: 0
SEIZURES: 0
SENSORY CHANGE: 0
BLURRED VISION: 0
LOSS OF CONSCIOUSNESS: 0
DOUBLE VISION: 0
SHORTNESS OF BREATH: 0
WEAKNESS: 0
NECK PAIN: 0
VOMITING: 0
COUGH: 0
CONSTIPATION: 0
HEARTBURN: 0
FALLS: 0
MYALGIAS: 0
DIAPHORESIS: 0
FOCAL WEAKNESS: 0
SPEECH CHANGE: 0
PALPITATIONS: 0
ABDOMINAL PAIN: 0
PND: 0
DEPRESSION: 0
CHILLS: 0
HEADACHES: 0
BLOOD IN STOOL: 0
FEVER: 0

## 2020-01-01 ASSESSMENT — LIFESTYLE VARIABLES: SUBSTANCE_ABUSE: 0

## 2020-01-01 NOTE — PROGRESS NOTES
[facility] Medicine Daily Progress Note    Date of Service  12/31/2019    Chief Complaint  64 y.o. male admitted 12/26/2019 with Leg pain / Blister     Hospital Course    Patient presented to the hospital with complaints of leg pain, blisters, worsening erythema.  Reports discoloration of the skin of the legs for the last 3 years.  Has previous known history of cellulitis of his lower extremities.      Interval Problem Update  Patient seen and evaluated on rounds  Discussed with nursing staff on rounds  Improving cellulitis     Wound team on case    EKG reviewed by me, QTc 456     On IV Zosyn,       outpt wound care ordered    Consultants/Specialty  None    Code Status  Full code    Disposition  Home with outpatient wound care    Review of Systems  Review of Systems   Constitutional: Positive for malaise/fatigue. Negative for chills, diaphoresis, fever and weight loss.   HENT: Negative for hearing loss and tinnitus.    Eyes: Negative for blurred vision and double vision.   Respiratory: Negative for cough, hemoptysis, sputum production, shortness of breath and wheezing.    Cardiovascular: Positive for leg swelling. Negative for chest pain, palpitations and PND.   Gastrointestinal: Negative for abdominal pain, blood in stool, constipation, diarrhea, heartburn, melena, nausea and vomiting.   Genitourinary: Negative for dysuria, flank pain, frequency, hematuria and urgency.   Musculoskeletal: Negative for back pain, falls, joint pain, myalgias and neck pain.   Skin: Positive for rash. Negative for itching.        Leg cellulitis, bullae/blister formation   Neurological: Negative for dizziness, tingling, tremors, sensory change, speech change, focal weakness, seizures, loss of consciousness, weakness and headaches.   Psychiatric/Behavioral: Negative for depression, substance abuse and suicidal ideas.        Physical Exam  Temp:  [36.6 °C (97.9 °F)-37.1 °C (98.7 °F)] 36.7 °C (98.1 °F)  Pulse:  [52-64] 52  Resp:  [17-18]  17  BP: (109-138)/(76-90) 124/82  SpO2:  [94 %-97 %] 95 %    Physical Exam  HENT:      Head: Normocephalic.      Right Ear: External ear normal.      Left Ear: External ear normal.      Nose: Nose normal.      Mouth/Throat:      Mouth: Mucous membranes are moist.   Eyes:      General: No scleral icterus.     Conjunctiva/sclera: Conjunctivae normal.      Pupils: Pupils are equal, round, and reactive to light.   Neck:      Musculoskeletal: Normal range of motion and neck supple.   Cardiovascular:      Rate and Rhythm: Normal rate and regular rhythm.      Pulses: Normal pulses.      Heart sounds: Murmur present. No friction rub. No gallop.       Comments: Aortic area systolic murmur, apical systolic murmur  Pulmonary:      Effort: Pulmonary effort is normal. No respiratory distress.      Breath sounds: Normal breath sounds. No stridor. No wheezing or rhonchi.   Abdominal:      General: Abdomen is flat. Bowel sounds are normal. There is no distension.      Palpations: There is no mass.      Tenderness: There is no tenderness. There is no right CVA tenderness, left CVA tenderness, guarding or rebound.      Hernia: No hernia is present.   Musculoskeletal: Normal range of motion.      Right lower leg: Edema present.      Left lower leg: Edema present.   Skin:     General: Skin is warm and dry.      Capillary Refill: Capillary refill takes 2 to 3 seconds.      Coloration: Skin is not jaundiced.      Findings: Erythema and rash present.      Comments: Patient has bilateral lower extremity venous stasis dermatitis with bilateral lower extremity superimposed cellulitis, more profound in the left lower extremity.  Blister, bullous formation   Neurological:      General: No focal deficit present.      Mental Status: He is alert and oriented to person, place, and time. Mental status is at baseline.   Psychiatric:         Mood and Affect: Mood normal.         Behavior: Behavior normal.         Thought Content: Thought content  normal.         Judgment: Judgment normal.       Ongoing improvement in cellulitis compared to yesterday    Fluids    Intake/Output Summary (Last 24 hours) at 12/31/2019 1821  Last data filed at 12/31/2019 0915  Gross per 24 hour   Intake 560 ml   Output 400 ml   Net 160 ml       Laboratory  Recent Labs     12/29/19 0049 12/30/19 0204 12/31/19 0135   WBC 5.6 4.9 6.3   RBC 3.89* 4.03* 4.38*   HEMOGLOBIN 12.0* 12.3* 13.5*   HEMATOCRIT 37.6* 39.3* 42.2   MCV 96.7 97.5 96.3   MCH 30.8 30.5 30.8   MCHC 31.9* 31.3* 32.0*   RDW 51.1* 52.4* 50.6*   PLATELETCT 278 278 296   MPV 8.8* 8.8* 8.7*     Recent Labs     12/29/19 0049 12/30/19 0204 12/31/19 0135   SODIUM 137 135 136   POTASSIUM 4.6 4.8 5.0   CHLORIDE 104 101 103   CO2 27 28 24   GLUCOSE 77 110* 93   BUN 17 21 25*   CREATININE 0.92 1.15 1.42*   CALCIUM 8.5 8.7 8.8                   Imaging  EC-ECHOCARDIOGRAM COMPLETE W/O CONT   Final Result      US-BHUPINDER SINGLE LEVEL BILAT   Final Result      CT-EXTREMITY, LOWER WITH LEFT   Final Result      Moderate to severe distal leg and medial ankle bullous change likely is infected.      Diffuse subcutaneous fat stranding and skin thickening could indicate bland edema or cellulitis and phlegmonous change.      No soft tissue gas or bony destruction is seen to suggest deep infection      CT-EXTREMITY, LOWER WITH RIGHT   Final Result      Diffuse subcutaneous fat stranding and skin thickening could indicate bland edema or cellulitis and phlegmonous change.      No abscess, soft tissue gas or bony destruction is seen to suggest infection      Healed tibia and fibula fractures with antegrade intramedullary nail in place           Assessment/Plan  * Venous stasis dermatitis of both lower extremities- (present on admission)  Assessment & Plan  With superimposed cellulitis of bilateral lower extremities, more profound in the left lower extremity  Left lower extremity with bullous formation, blisters secondary to underlying  infection  Arterial evaluation negative    Wound cultures obtained from December 26, 2019 are growing polymicrobial organisms including Serratia, Pseudomonas and Streptococcus.  Serratia is sensitive to Bactrim, Pseudomonas is sensitive to Cipro.    I have personally reviewed the culture data    Continue IV Zosyn   Po bactrim    Limb preservation service, wound team evaluations to continue     Pain control with oxycodone    Left leg cellulitis- (present on admission)  Assessment & Plan  Iv zosyn    Po bactrim    Other hyperlipidemia- (present on admission)  Assessment & Plan  Continue atorvastatin     Macrocytic anemia- (present on admission)  Assessment & Plan  Likely anemia of chronic disease, component of minimal iron deficiency anemia  B12 lower limit of normal  Vitamin C, multivitamin, B12, iron supplementation initiated  Monitor Hb / Restrictive transfusion strategy    Essential hypertension- (present on admission)  Assessment & Plan  Continue propranolol       VTE prophylaxis: SC Heparin     Check am cbc, bmp

## 2020-01-01 NOTE — CARE PLAN
Problem: Communication  Goal: The ability to communicate needs accurately and effectively will improve  Outcome: PROGRESSING AS EXPECTED  Note:    Pt is able to make needs known.      Problem: Safety  Goal: Will remain free from falls  Outcome: PROGRESSING AS EXPECTED  Intervention: Implement fall precautions  Flowsheets  Taken 12/31/2019 0817 by Lorelei Abraham R.N.  Environmental Precautions: Personal Belongings, Wastebasket, Call Bell etc. in Easy Reach;Transferred to Stronger Side;Report Given to Other Health Care Providers Regarding Fall Risk;Bed in Low Position;Communication Sign for Patients & Families;Mobility Assessed & Appropriate Sign Placed  Taken 1/1/2020 0200 by Kristine Sahu RERIN  Chair/Bed Strip Alarm: Yes - Alarm On

## 2020-01-01 NOTE — PROGRESS NOTES
Assumed care of patient at 1900 from bridgette ROGERS. Pt was resting in bed with no complaints. Assessment completed, VSS, pt is A/Ox4. The dressing on the left lower extremity is clean, dry, and intact. He continues to have pain in his left lower extremity, medicated per the MAR.  He declines ay further needs. Bed in the lowest locked position, call light in reach.

## 2020-01-01 NOTE — PROGRESS NOTES
[facility] Medicine Daily Progress Note    Date of Service  1/1/2020    Chief Complaint  64 y.o. male admitted 12/26/2019 with Leg pain / Blister     Hospital Course    Patient presented to the hospital with complaints of leg pain, blisters, worsening erythema.  Reports discoloration of the skin of the legs for the last 3 years.  Has previous known history of cellulitis of his lower extremities.      Interval Problem Update  Patient seen and evaluated on rounds  Discussed with nursing staff on rounds  Improving cellulitis     Wound team on case    EKG reviewed by me, QTc 456     Iv zosyn to be completed this afternoon    creatinine is worse    Noted bradycardia down to 46. Pt denies dizziness or sob    outpt wound care ordered    Consultants/Specialty  None    Code Status  Full code    Disposition  Home with outpatient wound care    Review of Systems  Review of Systems   Constitutional: Positive for malaise/fatigue. Negative for chills, diaphoresis, fever and weight loss.   HENT: Negative for hearing loss and tinnitus.    Eyes: Negative for blurred vision and double vision.   Respiratory: Negative for cough, hemoptysis, sputum production, shortness of breath and wheezing.    Cardiovascular: Positive for leg swelling. Negative for chest pain, palpitations and PND.   Gastrointestinal: Negative for abdominal pain, blood in stool, constipation, diarrhea, heartburn, melena, nausea and vomiting.   Genitourinary: Negative for dysuria, flank pain, frequency, hematuria and urgency.   Musculoskeletal: Negative for back pain, falls, joint pain, myalgias and neck pain.   Skin: Positive for rash. Negative for itching.        Leg cellulitis,    Neurological: Negative for dizziness, tingling, tremors, sensory change, speech change, focal weakness, seizures, loss of consciousness, weakness and headaches.   Psychiatric/Behavioral: Negative for depression, substance abuse and suicidal ideas.        Physical Exam  Temp:  [35 °C (95  °F)-36.9 °C (98.5 °F)] 36.9 °C (98.5 °F)  Pulse:  [46-61] 46  Resp:  [17-18] 17  BP: (117-140)/(64-82) 122/80  SpO2:  [93 %-95 %] 95 %    Physical Exam  HENT:      Head: Normocephalic.      Right Ear: External ear normal.      Left Ear: External ear normal.      Nose: Nose normal.      Mouth/Throat:      Mouth: Mucous membranes are moist.   Eyes:      General: No scleral icterus.     Conjunctiva/sclera: Conjunctivae normal.      Pupils: Pupils are equal, round, and reactive to light.   Neck:      Musculoskeletal: Normal range of motion and neck supple.   Cardiovascular:      Rate and Rhythm: Normal rate and regular rhythm.      Pulses: Normal pulses.      Heart sounds: Murmur present. No friction rub. No gallop.       Comments: Aortic area systolic murmur, apical systolic murmur  Pulmonary:      Effort: Pulmonary effort is normal. No respiratory distress.      Breath sounds: Normal breath sounds. No stridor. No wheezing or rhonchi.   Abdominal:      General: Abdomen is flat. Bowel sounds are normal. There is no distension.      Palpations: There is no mass.      Tenderness: There is no tenderness. There is no right CVA tenderness, left CVA tenderness, guarding or rebound.      Hernia: No hernia is present.   Musculoskeletal: Normal range of motion.      Right lower leg: Edema present.      Left lower leg: Edema present.   Skin:     General: Skin is warm and dry.      Capillary Refill: Capillary refill takes 2 to 3 seconds.      Coloration: Skin is not jaundiced.      Findings: Erythema and rash present.      Comments: Patient has bilateral lower extremity venous stasis dermatitis with bilateral lower extremity superimposed cellulitis, more profound in the left lower extremity.      LLE ulcers at site of previous blisters   Neurological:      General: No focal deficit present.      Mental Status: He is alert and oriented to person, place, and time. Mental status is at baseline.   Psychiatric:         Mood and Affect:  Mood normal.         Behavior: Behavior normal.         Thought Content: Thought content normal.         Judgment: Judgment normal.         Fluids    Intake/Output Summary (Last 24 hours) at 1/1/2020 1513  Last data filed at 1/1/2020 0931  Gross per 24 hour   Intake 590 ml   Output 1525 ml   Net -935 ml       Laboratory  Recent Labs     12/30/19 0204 12/31/19 0135 01/01/20  0213   WBC 4.9 6.3 5.6   RBC 4.03* 4.38* 4.24*   HEMOGLOBIN 12.3* 13.5* 12.9*   HEMATOCRIT 39.3* 42.2 41.0*   MCV 97.5 96.3 96.7   MCH 30.5 30.8 30.4   MCHC 31.3* 32.0* 31.5*   RDW 52.4* 50.6* 51.8*   PLATELETCT 278 296 313   MPV 8.8* 8.7* 8.6*     Recent Labs     12/30/19 0204 12/31/19 0135 01/01/20 0213   SODIUM 135 136 136   POTASSIUM 4.8 5.0 5.1   CHLORIDE 101 103 104   CO2 28 24 27   GLUCOSE 110* 93 89   BUN 21 25* 30*   CREATININE 1.15 1.42* 1.55*   CALCIUM 8.7 8.8 8.8                   Imaging  EC-ECHOCARDIOGRAM COMPLETE W/O CONT   Final Result      US-BHUPINDER SINGLE LEVEL BILAT   Final Result      CT-EXTREMITY, LOWER WITH LEFT   Final Result      Moderate to severe distal leg and medial ankle bullous change likely is infected.      Diffuse subcutaneous fat stranding and skin thickening could indicate bland edema or cellulitis and phlegmonous change.      No soft tissue gas or bony destruction is seen to suggest deep infection      CT-EXTREMITY, LOWER WITH RIGHT   Final Result      Diffuse subcutaneous fat stranding and skin thickening could indicate bland edema or cellulitis and phlegmonous change.      No abscess, soft tissue gas or bony destruction is seen to suggest infection      Healed tibia and fibula fractures with antegrade intramedullary nail in place           Assessment/Plan  * Venous stasis dermatitis of both lower extremities- (present on admission)  Assessment & Plan  With superimposed cellulitis of bilateral lower extremities, more profound in the left lower extremity  Left lower extremity with bullous formation, blisters  secondary to underlying infection  Arterial evaluation negative    Wound cultures obtained from December 26, 2019 are growing polymicrobial organisms including Serratia, Pseudomonas and Streptococcus.  Serratia is sensitive to Bactrim, Pseudomonas is sensitive to Cipro.    I have personally reviewed the culture data    Continue IV Zosyn to stop today  Po bactrim to po doxycycline due to worsening renal function    Po cipro    Limb preservation service, wound team evaluations to continue     Pain control with oxycodone    Bradycardia, drug induced- (present on admission)  Assessment & Plan  Inderal stopped    LUPE (acute kidney injury) (HCC)  Assessment & Plan  Saline bolus 500 cc x 1 ordered on 1/1    Stop bactrim  Check am bmp    Left leg cellulitis- (present on admission)  Assessment & Plan  Iv zosyn finished    Po bactrim to po doxycycline to to lupe  Po cipro    Other hyperlipidemia- (present on admission)  Assessment & Plan  Continue atorvastatin     Macrocytic anemia- (present on admission)  Assessment & Plan  Likely anemia of chronic disease, component of minimal iron deficiency anemia  B12 lower limit of normal  Vitamin C, multivitamin, B12, iron supplementation initiated  Monitor Hb / Restrictive transfusion strategy    Essential hypertension- (present on admission)  Assessment & Plan  Propranolol stopped on 1/1 due to bradycardia    Cont norvasc       VTE prophylaxis: SC Heparin     Check am cbc, bmp

## 2020-01-02 VITALS
HEART RATE: 56 BPM | TEMPERATURE: 97.9 F | OXYGEN SATURATION: 98 % | SYSTOLIC BLOOD PRESSURE: 131 MMHG | RESPIRATION RATE: 17 BRPM | HEIGHT: 71 IN | DIASTOLIC BLOOD PRESSURE: 82 MMHG | WEIGHT: 165.12 LBS | BODY MASS INDEX: 23.12 KG/M2

## 2020-01-02 PROBLEM — L03.116 LEFT LEG CELLULITIS: Status: RESOLVED | Noted: 2019-12-31 | Resolved: 2020-01-02

## 2020-01-02 PROBLEM — T50.905A BRADYCARDIA, DRUG INDUCED: Status: RESOLVED | Noted: 2020-01-01 | Resolved: 2020-01-02

## 2020-01-02 PROBLEM — R00.1 BRADYCARDIA, DRUG INDUCED: Status: RESOLVED | Noted: 2020-01-01 | Resolved: 2020-01-02

## 2020-01-02 PROBLEM — N17.9 AKI (ACUTE KIDNEY INJURY) (HCC): Status: RESOLVED | Noted: 2020-01-01 | Resolved: 2020-01-02

## 2020-01-02 LAB
ANION GAP SERPL CALC-SCNC: 7 MMOL/L (ref 0–11.9)
BUN SERPL-MCNC: 29 MG/DL (ref 8–22)
CALCIUM SERPL-MCNC: 8.8 MG/DL (ref 8.5–10.5)
CHLORIDE SERPL-SCNC: 105 MMOL/L (ref 96–112)
CO2 SERPL-SCNC: 25 MMOL/L (ref 20–33)
CREAT SERPL-MCNC: 1.42 MG/DL (ref 0.5–1.4)
ERYTHROCYTE [DISTWIDTH] IN BLOOD BY AUTOMATED COUNT: 52.8 FL (ref 35.9–50)
GLUCOSE SERPL-MCNC: 92 MG/DL (ref 65–99)
HCT VFR BLD AUTO: 43.5 % (ref 42–52)
HGB BLD-MCNC: 13.5 G/DL (ref 14–18)
MCH RBC QN AUTO: 30.8 PG (ref 27–33)
MCHC RBC AUTO-ENTMCNC: 31 G/DL (ref 33.7–35.3)
MCV RBC AUTO: 99.3 FL (ref 81.4–97.8)
PLATELET # BLD AUTO: 304 K/UL (ref 164–446)
PMV BLD AUTO: 8.6 FL (ref 9–12.9)
POTASSIUM SERPL-SCNC: 5 MMOL/L (ref 3.6–5.5)
RBC # BLD AUTO: 4.38 M/UL (ref 4.7–6.1)
SODIUM SERPL-SCNC: 137 MMOL/L (ref 135–145)
WBC # BLD AUTO: 6 K/UL (ref 4.8–10.8)

## 2020-01-02 PROCEDURE — 700102 HCHG RX REV CODE 250 W/ 637 OVERRIDE(OP): Performed by: INTERNAL MEDICINE

## 2020-01-02 PROCEDURE — 85027 COMPLETE CBC AUTOMATED: CPT

## 2020-01-02 PROCEDURE — A9270 NON-COVERED ITEM OR SERVICE: HCPCS | Performed by: HOSPITALIST

## 2020-01-02 PROCEDURE — 80048 BASIC METABOLIC PNL TOTAL CA: CPT

## 2020-01-02 PROCEDURE — 700102 HCHG RX REV CODE 250 W/ 637 OVERRIDE(OP): Performed by: HOSPITALIST

## 2020-01-02 PROCEDURE — 36415 COLL VENOUS BLD VENIPUNCTURE: CPT

## 2020-01-02 PROCEDURE — 700111 HCHG RX REV CODE 636 W/ 250 OVERRIDE (IP): Performed by: HOSPITALIST

## 2020-01-02 PROCEDURE — A9270 NON-COVERED ITEM OR SERVICE: HCPCS | Performed by: INTERNAL MEDICINE

## 2020-01-02 PROCEDURE — 99239 HOSP IP/OBS DSCHRG MGMT >30: CPT | Performed by: HOSPITALIST

## 2020-01-02 RX ORDER — FERROUS GLUCONATE 324(38)MG
324 TABLET ORAL 2 TIMES DAILY WITH MEALS
Qty: 60 TAB | Refills: 1 | Status: SHIPPED | OUTPATIENT
Start: 2020-01-02 | End: 2020-03-12 | Stop reason: SDUPTHER

## 2020-01-02 RX ORDER — ASCORBIC ACID 500 MG
500 TABLET ORAL 2 TIMES DAILY
Qty: 30 TAB | Refills: 3 | Status: SHIPPED | OUTPATIENT
Start: 2020-01-02 | End: 2020-03-12 | Stop reason: SDUPTHER

## 2020-01-02 RX ORDER — DOXYCYCLINE 100 MG/1
100 TABLET ORAL EVERY 12 HOURS
Qty: 10 TAB | Refills: 0 | Status: SHIPPED
Start: 2020-01-02 | End: 2020-03-12

## 2020-01-02 RX ORDER — OXYCODONE HYDROCHLORIDE 5 MG/1
5 TABLET ORAL EVERY 6 HOURS PRN
Qty: 30 TAB | Refills: 0 | Status: SHIPPED | OUTPATIENT
Start: 2020-01-02 | End: 2020-01-17

## 2020-01-02 RX ORDER — CIPROFLOXACIN 500 MG/1
500 TABLET, FILM COATED ORAL EVERY 12 HOURS
Qty: 10 TAB | Refills: 0 | Status: SHIPPED
Start: 2020-01-02 | End: 2020-03-12

## 2020-01-02 RX ADMIN — DOXYCYCLINE 100 MG: 100 TABLET, FILM COATED ORAL at 07:54

## 2020-01-02 RX ADMIN — OXYCODONE HYDROCHLORIDE 5 MG: 5 TABLET ORAL at 05:10

## 2020-01-02 RX ADMIN — CITALOPRAM HYDROBROMIDE 20 MG: 20 TABLET ORAL at 05:09

## 2020-01-02 RX ADMIN — ASPIRIN 81 MG: 81 TABLET, COATED ORAL at 05:09

## 2020-01-02 RX ADMIN — OXYCODONE HYDROCHLORIDE 5 MG: 5 TABLET ORAL at 09:28

## 2020-01-02 RX ADMIN — CYANOCOBALAMIN TAB 500 MCG 1000 MCG: 500 TAB at 05:10

## 2020-01-02 RX ADMIN — AMLODIPINE BESYLATE 10 MG: 10 TABLET ORAL at 05:09

## 2020-01-02 RX ADMIN — THERA TABS 1 TABLET: TAB at 05:10

## 2020-01-02 RX ADMIN — CIPROFLOXACIN HYDROCHLORIDE 500 MG: 500 TABLET, FILM COATED ORAL at 05:09

## 2020-01-02 RX ADMIN — FERROUS GLUCONATE 324 MG: 324 TABLET ORAL at 07:54

## 2020-01-02 RX ADMIN — OXYCODONE HYDROCHLORIDE AND ACETAMINOPHEN 500 MG: 500 TABLET ORAL at 05:09

## 2020-01-02 RX ADMIN — OXYCODONE HYDROCHLORIDE 5 MG: 5 TABLET ORAL at 14:35

## 2020-01-02 RX ADMIN — HEPARIN SODIUM 5000 UNITS: 5000 INJECTION, SOLUTION INTRAVENOUS; SUBCUTANEOUS at 05:10

## 2020-01-02 NOTE — CARE PLAN
Problem: Bowel/Gastric:  Goal: Normal bowel function is maintained or improved  Outcome: PROGRESSING AS EXPECTED  Note:   Pt having normal bowel movements daily. Encouraged to continue to drink fluids and mobilize as much as possible.      Problem: Discharge Barriers/Planning  Goal: Patient's continuum of care needs will be met  Outcome: PROGRESSING AS EXPECTED  Note:   Pt to d/c today. Waiting for a ride home.

## 2020-01-02 NOTE — WOUND TEAM
"[facility]  Inpatient Services   Wound and Skin Care Progress    Admission Date:  12/26/2019   HPI, PMH, SH: Reviewed Pt presented to ED for right leg swelling and blisters.    Hx: CHF, leg wound care, non diabetic [place]  Unit where seen by Wound Team: [id number]    WOUND CONSULT RELATED TO:  Left leg wounds    SUBJECTIVE:  \"They might be letting me go today\"      Self Report / Pain Level: denies pain    OBJECTIVE:  Dressing intact to LLE. Moving independently.    WOUND TYPE, LOCATION, CHARACTERISTICS (Pressure ulcers: location, stage, POA or date identified)          Wound 12/27/19 Other (comment) Pretibial Multiple blisters throughout LLE (Active)   Wound Image      1/2/2020  9:30 AM   Site Assessment Pale;Pink;Red;Drainage    Marylou-wound Assessment Yellow-brown;Red    Margins Undefined edges    Tunneling 0 cm    Undermining 0 cm    Closure None    Drainage Amount Small    Drainage Description Serosanguineous    Non-staged Wound Description Partial thickness    Treatments Cleansed    Cleansing Approved Wound Cleanser    Periwound Protectant Not Applicable    Dressing Options Viscopaste;Absorbent Abdominal Pad;Roll Gauze    Dressing Cleansing/Solutions Not Applicable    Dressing Changed Changed    Dressing Status Dry;Intact;Clean    Dressing Change Frequency Every 48 hrs    NEXT Dressing Change  01/04/20    NEXT Weekly Photo (Inpatient Only) 01/09/20    WOUND NURSE ONLY - Odor None    WOUND NURSE ONLY - Exposed Structures None    WOUND NURSE ONLY - Tissue Type and Percentage 100 red/pink    WOUND NURSE ONLY - Time Spent with Patient (mins)          Vascular:   10/17/18 Findings   Bilateral   There is no evidence of major arterial disease demonstrated bilaterally.   Doppler waveforms of the common femoral, popliteal, posterior tibial, and    dorsalis pedis arteries are of high amplitude and triphasic.       Lab Values:    WBC:       Lab Results   Component Value Date/Time    WBC 6.0 01/02/2020 04:01 AM "    RBC 4.38 (L) 01/02/2020 04:01 AM     AIC:      Lab Results   Component Value Date/Time    HBA1C 5.5 12/22/2018 12:36 AM      12/26 CT scan leg IMPRESSION:     Moderate to severe distal leg and medial ankle bullous change likely is infected.     Diffuse subcutaneous fat stranding and skin thickening could indicate bland edema or cellulitis and phlegmonous change.     No soft tissue gas or bony destruction is seen to suggest deep infection     Culture: 12/26/19 prelim negative         INTERVENTIONS BY WOUND TEAM:  Dressings removed, wound and periwound cleansed with wound cleanser. Viscopaste wrapped on leg followed by one ABD pad and rolled gauze.      Dressing selection:  Viscopaste, 1 rolled gauze, 1 ABD        Interdisciplinary consultation:  RN, patient      EVALUATION: Wounds appear to be resolving compared to previous pictures. Will encourage epithelialization with viscopaste. Pt states he may be discharging today and will f/u with the OP clinic.  Factors affecting wound healing:  CHF, PVD, Hep C, poly substance abuse, smoker, CVI  Goals:  Steady decrease in wound area and depth weekly     NURSING PLAN OF CARE ORDERS (X):    Dressing changes: See Dressing Care orders:   X  Skin care: See Skin Care orders:   Rectal tube care: See Rectal Tube Care orders:   Other orders:      WOUND TEAM PLAN OF CARE (X):   NPWT change 3 x week:        Dressing changes by wound team:   X    Follow up as needed:       Other (explain):      Anticipated discharge plans (X):  SNF:           Home Care:         Outpatient Wound Center:            Self Care:            Other:         Outpatient clinic. Will benefit from ongoing wound care. Referral previously placed.

## 2020-01-02 NOTE — CARE PLAN
Problem: Communication  Goal: The ability to communicate needs accurately and effectively will improve  Outcome: PROGRESSING AS EXPECTED  Note:   Pt is able to make needs known appropriately.      Problem: Safety  Goal: Will remain free from falls  Outcome: PROGRESSING AS EXPECTED  Intervention: Implement fall precautions  Flowsheets (Taken 12/31/2019 0817 by Lorelei Abraham R.N.)  Environmental Precautions: Personal Belongings, Wastebasket, Call Bell etc. in Easy Reach;Transferred to Stronger Side;Report Given to Other Health Care Providers Regarding Fall Risk;Bed in Low Position;Communication Sign for Patients & Families;Mobility Assessed & Appropriate Sign Placed

## 2020-01-02 NOTE — DISCHARGE PLANNING
Medication reconcilliation completed. Medications delivered to patient at bedside. Patient counseled.    Interventions include: patient is requesting pain reliever. Rx for oxycodone printed per Epic.     Goran Chavez   Home Medication Instructions MAXIMILIANO:[id number]    Printed on:01/02/20 1237   Medication Information                      ascorbic acid (VITAMIN C) 500 MG tablet  Take 1 Tab by mouth 2 Times a Day.             ciprofloxacin (CIPRO) 500 MG Tab  Take 1 Tab by mouth every 12 hours.             doxycycline monohydrate (ADOXA) 100 MG tablet  Take 1 Tab by mouth every 12 hours.             ferrous gluconate (FERGON) 324 (38 Fe) MG Tab  Take 1 Tab by mouth 2 times a day, with meals.

## 2020-01-02 NOTE — PROGRESS NOTES
Assumed care at 0700, report received from NOC RN.  Pt A&O x 4, states pain is 6/10--medication given per MAR. Bed locked, 2 rails up, bed in lowest position. Call light in place, belongings at bedside, no needs at this time and hourly rounding in place.  Discussed discharge, pt verbalized understanding. Pt stated that his ride will be here around 1430.

## 2020-01-02 NOTE — DISCHARGE INSTRUCTIONS
Discharge Instructions    Discharged to home by car with relative. Discharged via wheelchair, hospital escort: Yes.  Special equipment needed: Not Applicable    Be sure to schedule a follow-up appointment with your primary care doctor or any specialists as instructed.     Discharge Plan:   Diet Plan: Discussed  Activity Level: Discussed  Confirmed Follow up Appointment: Appointment Scheduled  Confirmed Symptoms Management: Discussed  Medication Reconciliation Updated: Yes  Influenza Vaccine Indication: Indicated: 9 to 64 years of age  Influenza Vaccine Given - only chart on this line when given: Influenza Vaccine Given (See MAR)    I understand that a diet low in cholesterol, fat, and sodium is recommended for good health. Unless I have been given specific instructions below for another diet, I accept this instruction as my diet prescription.   Other diet: Cardiac    Special Instructions: None    · Is patient discharged on Warfarin / Coumadin?   No     Depression / Suicide Risk    As you are discharged from this [facility] facility, it is important to learn how to keep safe from harming yourself.    Recognize the warning signs:  · Abrupt changes in personality, positive or negative- including increase in energy   · Giving away possessions  · Change in eating patterns- significant weight changes-  positive or negative  · Change in sleeping patterns- unable to sleep or sleeping all the time   · Unwillingness or inability to communicate  · Depression  · Unusual sadness, discouragement and loneliness  · Talk of wanting to die  · Neglect of personal appearance   · Rebelliousness- reckless behavior  · Withdrawal from people/activities they love  · Confusion- inability to concentrate     If you or a loved one observes any of these behaviors or has concerns about self-harm, here's what you can do:  · Talk about it- your feelings and reasons for harming yourself  · Remove any means that you might use to hurt yourself  (examples: pills, rope, extension cords, firearm)  · Get professional help from the community (Mental Health, Substance Abuse, psychological counseling)  · Do not be alone:Call your Safe Contact- someone whom you trust who will be there for you.  · Call your local CRISIS HOTLINE 261-4327 or 914-294-9030  · Call your local Children's Mobile Crisis Response Team [place] (855) 726-3728 or www.[employer]s  · Call the toll free National Suicide Prevention Hotlines   · National Suicide Prevention Lifeline 644-114-QIZE (4398)  · National Hope Line Network 800-SUICIDE (955-1734)      Discharge Instructions per Shmuel Lozada M.D.        DIET: cardiac    ACTIVITY: as tolerated    DIAGNOSIS: infected leg ulcer

## 2020-01-02 NOTE — PROGRESS NOTES
Assumed care of patient from GIANNA RN at 1900. Pt was sitting up in bed watching TV with no complaints. Assessment completed, VSS, pt is A/Ox4. He reports 6/10 pain in his left leg described at throbbing. Medicated per the MAR for some relief. Denies any further needs. Bed in the lowest locked position, call light in reach.

## 2020-01-03 NOTE — DISCHARGE SUMMARY
Discharge Summary    CHIEF COMPLAINT ON ADMISSION  Chief Complaint   Patient presents with   • Wound Check     LLE chronic, blistered today       Reason for Admission  Lower L leg pain     Admission Date  12/26/2019    CODE STATUS  Prior    HPI & HOSPITAL COURSE     Patient presented to the hospital with complaints of leg pain, blisters, worsening erythema.  Reports discoloration of the skin of the legs for the last 3 years.  Has previous known history of cellulitis of his lower extremities.       Patient received aggressive wound care to left lower extremity    Patient received IV antibiotics of IV Zosyn    Wound culture grew pseudomonas aeruginosa as well as Streptococcus agalactiae    The antibiotics were transitioned to p.o. Cipro and p.o. doxycycline    He was stable for discharge with outpatient wound care      During his hospital stay we noted patient had bradycardia which was asymptomatic but was significant at 46 related to his Inderal that he takes for blood pressure control we continued his Norvasc we stopped the Inderal the bradycardia improved and his blood pressure remained stable.  Patient will follow-up with his primary care physician for further care management of blood pressure control    Therefore, he is discharged in good and stable condition to home with close outpatient follow-up.    The patient met 2-midnight criteria for an inpatient stay at the time of discharge.    Discharge Date  1/2/2020    FOLLOW UP ITEMS POST DISCHARGE      DISCHARGE DIAGNOSES  Principal Problem:    Venous stasis dermatitis of both lower extremities POA: Yes  Active Problems:    Essential hypertension POA: Yes    Macrocytic anemia POA: Yes    Other hyperlipidemia POA: Yes  Resolved Problems:    Left leg cellulitis POA: Yes    LUPE (acute kidney injury) (HCC) POA: No    Bradycardia, drug induced POA: Yes      FOLLOW UP  Future Appointments   Date Time Provider Department Center   1/8/2020  4:20 PM Marcela Ch M.D.  [facility]     Marcela Ch M.D.  [address]  [place] [address]  [phone]    Go on 1/8/2020  Please go to your primary care appointment at 2:20pm. Check in at 2:10pm.    [facility]  [address]  [place] [address]  [phone]  Schedule an appointment as soon as possible for a visit  For wound re-check      MEDICATIONS ON DISCHARGE     Medication List      START taking these medications      Instructions   ascorbic acid 500 MG tablet  Commonly known as:  VITAMIN C   Take 1 Tab by mouth 2 Times a Day.  Dose:  500 mg     ciprofloxacin 500 MG Tabs  Commonly known as:  CIPRO   Take 1 Tab by mouth every 12 hours.  Dose:  500 mg     doxycycline monohydrate 100 MG tablet  Commonly known as:  ADOXA   Take 1 Tab by mouth every 12 hours.  Dose:  100 mg     ferrous gluconate 324 (38 Fe) MG Tabs  Commonly known as:  FERGON   Take 1 Tab by mouth 2 times a day, with meals.  Dose:  324 mg     oxyCODONE immediate-release 5 MG Tabs  Commonly known as:  ROXICODONE   Take 1 Tab by mouth every 6 hours as needed for up to 15 days.  Dose:  5 mg        CONTINUE taking these medications      Instructions   amLODIPine 10 MG Tabs  Commonly known as:  NORVASC   Take 10 mg by mouth every day.  Dose:  10 mg     citalopram 20 MG Tabs  Commonly known as:  CELEXA   Take 20 mg by mouth every day.  Dose:  20 mg     LIPITOR 40 MG Tabs  Generic drug:  atorvastatin   Take 40 mg by mouth every evening.  Dose:  40 mg        STOP taking these medications    propranolol 10 MG Tabs  Commonly known as:  INDERAL            Allergies  Allergies   Allergen Reactions   • Norco [Hydrocodone-Acetaminophen] Itching       DIET  No orders of the defined types were placed in this encounter.      ACTIVITY  As tolerated.  Weight bearing as tolerated    CONSULTATIONS      PROCEDURES      LABORATORY  Lab Results   Component Value Date    SODIUM 137 01/02/2020    POTASSIUM 5.0 01/02/2020    CHLORIDE 105 01/02/2020    CO2 25  01/02/2020    GLUCOSE 92 01/02/2020    BUN 29 (H) 01/02/2020    CREATININE 1.42 (H) 01/02/2020        Lab Results   Component Value Date    WBC 6.0 01/02/2020    HEMOGLOBIN 13.5 (L) 01/02/2020    HEMATOCRIT 43.5 01/02/2020    PLATELETCT 304 01/02/2020        Total time of the discharge process exceeds 39 minutes.

## 2020-01-03 NOTE — PROGRESS NOTES
Pt discharged home with no further needs. Daughter picked up pt. Pt escorted via wheelchair by CNA. PIV removed, tip intact. All medications and discharge information reviewed with pt and daughter. Pt to schedule wound clinic follow up and follow up with PCP. Controlled substance informed consent reviewed and signed.

## 2020-01-07 ENCOUNTER — NON-PROVIDER VISIT (OUTPATIENT)
Dept: WOUND CARE | Facility: MEDICAL CENTER | Age: 65
End: 2020-01-07
Attending: HOSPITALIST
Payer: MEDICAID

## 2020-01-07 ENCOUNTER — PATIENT OUTREACH (OUTPATIENT)
Dept: HEALTH INFORMATION MANAGEMENT | Facility: OTHER | Age: 65
End: 2020-01-07

## 2020-01-07 PROCEDURE — 97597 DBRDMT OPN WND 1ST 20 CM/<: CPT

## 2020-01-07 PROCEDURE — 99211 OFF/OP EST MAY X REQ PHY/QHP: CPT

## 2020-01-07 NOTE — PROGRESS NOTES
"Spoke to Megan (emergency contact) regarding Goran. Megan indicated Goran is doing \"okay\" after d/c. She took him to his Wound Care appmnt today. Reminded Megan about Goran's follow up appmnt on his AVS. He has a follow up appmnt with Marcela Ch tomorrow (1/8/20) at 2:20pm. Megan will be taking Miachel. Goran got his medications bedside via meds-to-beds. She reports Goran is taking his meds on time. He had no further questions regarding how to take them. Megan indicated Goran does not need any other resources at this time. Advised her and Goran to reach out to [facility] for questions/concerns. Goran met all goals and will be d/c from [facility] services.   "

## 2020-01-08 ENCOUNTER — OFFICE VISIT (OUTPATIENT)
Dept: MEDICAL GROUP | Facility: MEDICAL CENTER | Age: 65
End: 2020-01-08
Attending: INTERNAL MEDICINE
Payer: MEDICAID

## 2020-01-08 VITALS
DIASTOLIC BLOOD PRESSURE: 90 MMHG | OXYGEN SATURATION: 98 % | HEART RATE: 86 BPM | RESPIRATION RATE: 16 BRPM | TEMPERATURE: 97.6 F | HEIGHT: 72 IN | WEIGHT: 175 LBS | SYSTOLIC BLOOD PRESSURE: 140 MMHG | BODY MASS INDEX: 23.7 KG/M2

## 2020-01-08 DIAGNOSIS — R79.89 ELEVATED SERUM CREATININE: ICD-10-CM

## 2020-01-08 DIAGNOSIS — M14.672 CHARCOT'S JOINT OF LEFT FOOT, NON-DIABETIC: ICD-10-CM

## 2020-01-08 DIAGNOSIS — G47.00 INSOMNIA, UNSPECIFIED TYPE: ICD-10-CM

## 2020-01-08 DIAGNOSIS — Z87.898 HISTORY OF PREDIABETES: ICD-10-CM

## 2020-01-08 DIAGNOSIS — E78.49 OTHER HYPERLIPIDEMIA: ICD-10-CM

## 2020-01-08 DIAGNOSIS — G62.9 PERIPHERAL POLYNEUROPATHY: ICD-10-CM

## 2020-01-08 DIAGNOSIS — Z09 HOSPITAL DISCHARGE FOLLOW-UP: ICD-10-CM

## 2020-01-08 PROBLEM — R04.0 RECURRENT EPISTAXIS: Status: RESOLVED | Noted: 2018-04-18 | Resolved: 2020-01-08

## 2020-01-08 PROBLEM — L08.9 WOUND INFECTION: Status: RESOLVED | Noted: 2019-03-11 | Resolved: 2020-01-08

## 2020-01-08 PROBLEM — T14.8XXA WOUND INFECTION: Status: RESOLVED | Noted: 2019-03-11 | Resolved: 2020-01-08

## 2020-01-08 PROCEDURE — 99214 OFFICE O/P EST MOD 30 MIN: CPT | Performed by: INTERNAL MEDICINE

## 2020-01-08 PROCEDURE — 99213 OFFICE O/P EST LOW 20 MIN: CPT | Performed by: INTERNAL MEDICINE

## 2020-01-08 RX ORDER — AMITRIPTYLINE HYDROCHLORIDE 10 MG/1
10 TABLET, FILM COATED ORAL
Qty: 30 TAB | Refills: 3 | Status: SHIPPED
Start: 2020-01-08 | End: 2020-03-12

## 2020-01-08 ASSESSMENT — PAIN SCALES - GENERAL: PAINLEVEL: 5=MODERATE PAIN

## 2020-01-08 NOTE — PATIENT INSTRUCTIONS
Avoid prolonged standing or sitting without elevating your legs.  - Multilayer compression wrap to left leg. Do not get wet and keep on for the week. Only remove if temperature or sensation changes.   If compression needs to be removed, un-wrap it do not cut it off.     Should you experience any significant changes in your wound(s), such as infection (redness, swelling, localized heat, increased pain, fever > 101 F, chills) or have any questions regarding your home care instructions, please contact the wound center at [phone]. If after hours, contact your primary care physician or go to the hospital emergency room.   Keep dressing clean, dry and covered while bathing. Only change dressing if it becomes over saturated, soiled or falls off.

## 2020-01-08 NOTE — CERTIFICATION
Non Provider Encounter- Lower Extremity Ulcer    HISTORY OF PRESENT ILLNESS  Wound History:    START OF CARE IN CLINIC: 1/7/20    REFERRING PROVIDER: CELE REYES     WOUND ETIOLOGY: venous   LOCATION: Left lower leg   HISTORY: Patient was admitted to [facility] 12/26/19 for LLE blisters filled with yellow pus and pain. Past medical history of CHF, hypertension, anxiety, tobacco abuse; history of chronic poorly healing wounds due to peripheral vascular disease. Patient has been seen at [facility] clinic 2016, 2018 & last discharged from [facility] wound end of 2019.    Pertinent Medical History: has a past medical history of Anxiety, Charcot's joint of left foot, non-diabetic (3/21/2016), Chronic congestive heart failure (HCC) (11/16/2017), Hepatitis C, chronic (HCC), Hypertension, Migraine, Polysubstance abuse (HCC) (3/8/2018), Tobacco use (4/18/2016), Ulcer of left lower extremity with necrosis of muscle ([facility]) (3/21/2016), and Venous stasis ulcer ([facility]) (2017).   ETIOLOGY HISTORY:  Vascular Surgeon: Dr. White. Compression Circ-aid. Varicose Veins none visible     TOBACCO USE: Patient denies; patient also denies alcohol and recreational drug use    FALL RISK ASSESSMENT:    65 years or older     Fall within the last 2 years   Uses ambulatory devices  Loss of protective sensation in feet   Use of prostethic/orthotic    Presence of lower extremity/foot/toe amputation   X- Taking medication that increases risk (per facility policy)    Interventions Recommended (if any of the above are selected):   Use of Assistive Device:   Supervision with ambulation: Caregiver   Assistance with ambulation: Caregiver   Home safety education: Educational material provided    MOST RECENT VASCULAR STUDIES: 12/27/19: Right triphasic 1.31; Left triphasic & Biphasic; toe index 0.94. 10/17/18: 1.13 Left, 1.23 Right.    12/19/18- x-ray tibia and fibula left; 3 view x-ray left foot; 2/27/19-x-ray of left heel  12/26/19- rare growth positive  culture      PAST MEDICAL HISTORY:   Past Medical History:   Diagnosis Date   • Anxiety    • Charcot's joint of left foot, non-diabetic 3/21/2016   • Chronic congestive heart failure (HCC) 11/16/2017   • Hepatitis C, chronic (HCC)    • Hypertension    • Migraine    • Polysubstance abuse (HCC) 3/8/2018   • Tobacco use 4/18/2016   • Ulcer of left lower extremity with necrosis of muscle (HCC) 3/21/2016   • Venous stasis ulcer (HCC) 2017    bilateral lower extremity       PAST SURGICAL HISTORY:   Past Surgical History:   Procedure Laterality Date   • TIBIA ORIF Right 1997   • SHOULDER ORIF Left 1997   • HAND SURGERY Left     due to infection   • PB DRAIN SKIN ABSCESS SIMPLE Left     leg, near the knee, remote        MEDICATIONS:   Current Outpatient Medications   Medication   • ascorbic acid (VITAMIN C) 500 MG tablet   • ciprofloxacin (CIPRO) 500 MG Tab   • doxycycline monohydrate (ADOXA) 100 MG tablet   • ferrous gluconate (FERGON) 324 (38 Fe) MG Tab   • oxyCODONE immediate-release (ROXICODONE) 5 MG Tab   • amLODIPine (NORVASC) 10 MG Tab   • atorvastatin (LIPITOR) 40 MG Tab   • citalopram (CELEXA) 20 MG Tab     No current facility-administered medications for this visit.        ALLERGIES:    Allergies   Allergen Reactions   • Norco [Hydrocodone-Acetaminophen] Itching         SOCIAL HISTORY:   Social History     Socioeconomic History   • Marital status: Legally      Spouse name: Not on file   • Number of children: Not on file   • Years of education: Not on file   • Highest education level: Not on file   Occupational History   • Not on file   Social Needs   • Financial resource strain: Not hard at all   • Food insecurity:     Worry: Never true     Inability: Never true   • Transportation needs:     Medical: No     Non-medical: No   Tobacco Use   • Smoking status: Former Smoker     Packs/day: 0.00     Years: 48.00     Pack years: 0.00     Types: Cigarettes     Last attempt to quit: 12/5/2018     Years since  quittin.0   • Smokeless tobacco: Never Used   • Tobacco comment: states he is using 7mg nicotine patch   Substance and Sexual Activity   • Alcohol use: No     Alcohol/week: 7.2 oz     Types: 12 Cans of beer per week     Comment: history of alcoholism    • Drug use: Yes     Types: Marijuana, Inhaled, Methamphetamines     Comment: MJ every day, intermittent meth use   • Sexual activity: Not on file   Lifestyle   • Physical activity:     Days per week: Not on file     Minutes per session: Not on file   • Stress: Not on file   Relationships   • Social connections:     Talks on phone: Not on file     Gets together: Not on file     Attends [employer] service: Not on file     Active member of club or organization: Not on file     Attends meetings of clubs or organizations: Not on file     Relationship status: Not on file   • Intimate partner violence:     Fear of current or ex partner: Not on file     Emotionally abused: Not on file     Physically abused: Not on file     Forced sexual activity: Not on file   Other Topics Concern   • Not on file   Social History Narrative   • Not on file       FAMILY HISTORY:   Family History   Problem Relation Age of Onset   • Lung Disease Mother 70         from COPD   • Hypertension Mother    • Other Father 82         from brain aneurysm after fall   • Cancer Neg Hx    • Heart Disease Neg Hx    • Stroke Neg Hx    • Diabetes Neg Hx        WOUND ASSESSMENT       Wound 20 Partial Thickness Wound Pretibial Left lower leg anterior (Active)   Wound Image     2020  1:00 PM   Site Assessment [place] 2020  1:00 PM   Marylou-wound Assessment Maceration;Edema 2020  1:00 PM   Margins Attached edges 2020  1:00 PM   Wound Length (cm) 3 cm 2020  1:00 PM   Wound Width (cm) 4 cm 2020  1:00 PM   Wound Depth (cm) 0.1 cm 2020  1:00 PM   Wound Surface Area (cm^2) 12 cm^2 2020  1:00 PM   Post Wound Length (cm) 3 cm 2020  1:00 PM    Post Wound Width (cm) 4 cm  1/7/2020  1:00 PM   Post Wound Depth (cm) 0.1 cm 1/7/2020  1:00 PM   Post Wound Surface Area (cm^2) 12 cm^2 1/7/2020  1:00 PM   Tunneling 0 cm 1/7/2020  1:00 PM   Undermining 0 cm 1/7/2020  1:00 PM   Closure Secondary intention 1/7/2020  1:00 PM   Drainage Amount Small 1/7/2020  1:00 PM   Drainage Description Serous;Yellow 1/7/2020  1:00 PM   Non-staged Wound Description Partial thickness 1/7/2020  1:00 PM   Treatments Cleansed 1/7/2020  1:00 PM   Cleansing Approved Wound Cleanser 1/7/2020  1:00 PM   Periwound Protectant Barrier Paste 1/7/2020  1:00 PM   Dressing Options Compression Wrap Two Layer;Viscopaste;Petrolatum Gauze (yellow) 1/7/2020  1:00 PM   Dressing Cleansing/Solutions Not Applicable 1/7/2020  1:00 PM   Dressing Changed Changed 1/7/2020  1:00 PM   Dressing Status Clean;Dry;Intact 1/7/2020  1:00 PM   Dressing Change Frequency Weekly 1/7/2020  1:00 PM   WOUND NURSE ONLY - Odor None 1/7/2020  1:00 PM   WOUND NURSE ONLY - Pulses 2+;Left;DP;PT 1/7/2020  1:00 PM   WOUND NURSE ONLY - Exposed Structures None 1/7/2020  1:00 PM   WOUND NURSE ONLY - Tissue Type and Percentage 100% pink pre debridement 1/7/2020  1:00 PM       Wound 01/07/20 Full Thickness Wound Tibia Left lower leg posterior medial (Active)   Wound Image     1/7/2020  1:00 PM   Site Assessment Red;Yellow 1/7/2020  1:00 PM   Marylou-wound Assessment Edema 1/7/2020  1:00 PM   Margins Attached edges 1/7/2020  1:00 PM   Wound Length (cm) 2 cm 1/7/2020  1:00 PM   Wound Width (cm) 2.2 cm 1/7/2020  1:00 PM   Wound Depth (cm) 0.1 cm 1/7/2020  1:00 PM   Wound Surface Area (cm^2) 4.4 cm^2 1/7/2020  1:00 PM   Post Wound Length (cm) 2 cm 1/7/2020  1:00 PM    Post Wound Width (cm) 2.2 cm 1/7/2020  1:00 PM   Post Wound Depth (cm) 0.1 cm 1/7/2020  1:00 PM   Post Wound Surface Area (cm^2) 4.4 cm^2 1/7/2020  1:00 PM   Tunneling 0 cm 1/7/2020  1:00 PM   Undermining 0 cm 1/7/2020  1:00 PM   Closure Secondary intention 1/7/2020  1:00 PM   Drainage Amount Moderate  1/7/2020  1:00 PM   Drainage Description Yellow;Serous 1/7/2020  1:00 PM   Non-staged Wound Description Full thickness 1/7/2020  1:00 PM   Treatments Cleansed 1/7/2020  1:00 PM   Cleansing Approved Wound Cleanser 1/7/2020  1:00 PM   Periwound Protectant Barrier Paste 1/7/2020  1:00 PM   Dressing Options Collagen Dressing;Compression Wrap Two Layer;Viscopaste;Hydrofera Blue Ready 1/7/2020  1:00 PM   Dressing Cleansing/Solutions Other (Comments) 1/7/2020  1:00 PM   Dressing Changed Changed 1/7/2020  1:00 PM   Dressing Status Dry;Clean;Intact 1/7/2020  1:00 PM   Dressing Change Frequency Weekly 1/7/2020  1:00 PM   WOUND NURSE ONLY - Odor None 1/7/2020  1:00 PM   WOUND NURSE ONLY - Pulses Left;2+;PT;DP 1/7/2020  1:00 PM   WOUND NURSE ONLY - Exposed Structures None 1/7/2020  1:00 PM   WOUND NURSE ONLY - Tissue Type and Percentage 30% yellow, 70% dark red pre debridement 1/7/2020  1:00 PM       Wound 01/07/20 Full Thickness Wound Tibia Left lower leg posterior lateral (Active)   Wound Image     1/7/2020  1:00 PM   Site Assessment Red;Pink;Yellow 1/7/2020  1:00 PM   Marylou-wound Assessment Maceration 1/7/2020  1:00 PM   Margins Attached edges 1/7/2020  1:00 PM   Wound Length (cm) 4.8 cm 1/7/2020  1:00 PM   Wound Width (cm) 1 cm 1/7/2020  1:00 PM   Wound Depth (cm) 0.1 cm 1/7/2020  1:00 PM   Wound Surface Area (cm^2) 4.8 cm^2 1/7/2020  1:00 PM   Post Wound Length (cm) 4.8 cm 1/7/2020  1:00 PM    Post Wound Width (cm) 1 cm 1/7/2020  1:00 PM   Post Wound Depth (cm) 0.1 cm 1/7/2020  1:00 PM   Post Wound Surface Area (cm^2) 4.8 cm^2 1/7/2020  1:00 PM   Tunneling 0 cm 1/7/2020  1:00 PM   Undermining 0 cm 1/7/2020  1:00 PM   Closure Secondary intention 1/7/2020  1:00 PM   Drainage Amount Moderate 1/7/2020  1:00 PM   Drainage Description Yellow;Serous 1/7/2020  1:00 PM   Non-staged Wound Description Full thickness 1/7/2020  1:00 PM   Treatments Cleansed 1/7/2020  1:00 PM   Cleansing Approved Wound Cleanser 1/7/2020  1:00 PM    Periwound Protectant Barrier Paste 1/7/2020  1:00 PM   Dressing Options Collagen Dressing;Compression Wrap Two Layer;Viscopaste;Hydrofera Blue Ready 1/7/2020  1:00 PM   Dressing Cleansing/Solutions Other (Comments) 1/7/2020  1:00 PM   Dressing Changed Changed 1/7/2020  1:00 PM   Dressing Status Clean;Dry;Intact 1/7/2020  1:00 PM   Dressing Change Frequency Weekly 1/7/2020  1:00 PM   WOUND NURSE ONLY - Odor None 1/7/2020  1:00 PM   WOUND NURSE ONLY - Pulses 2+;Left;DP;PT 1/7/2020  1:00 PM   WOUND NURSE ONLY - Exposed Structures None 1/7/2020  1:00 PM   WOUND NURSE ONLY - Tissue Type and Percentage 30% yellow, 70% pink red pre debridement 1/7/2020  1:00 PM       PATIENT EDUCATION   - Etiology of venous ulceration discussed with patient  - Importance of managing edema for healing of ulcer, and for prevention of new ulcer development  -Need for lifelong compression of lower legs   -Elevate legs above the level of the heart periodically throughout the day.  - Importance of adequate nutrition for wound healing  -Increase protein intake (unless contraindicated by renal status)  -Discussed importance of smoking cessation.  -Advised to go to ER for any increased redness, swelling, drainage or odor, or if patient develops fever, chills, nausea or vomiting.

## 2020-01-09 PROBLEM — G47.00 INSOMNIA: Status: ACTIVE | Noted: 2020-01-09

## 2020-01-09 NOTE — ASSESSMENT & PLAN NOTE
Reports pain in both feet that is sharp an stabbing.  He believes there is a piece of glass embedded in his left foot.  Per patient's daughter, several years ago, there was attempted removal of the glass but they state it was not successful.  Imaging has failed to show any foreign body.  Was previously taking gabapentin.  He is a difficult historian and cannot tell me whether this was helpful, but he stopped it because he thought it was causing his memory to get worse.  Was prescribed oxycodone from the hospital which he has been taking for his neuropathy since discharge.  Is very high risk for opiate use given concurrent ETOH use and methamphetamine use.  He and his daughter requested refills of the oxycodone but we discussed this is a poor long term option for the patient and I will not refill it.

## 2020-01-09 NOTE — ASSESSMENT & PLAN NOTE
Has a long history of L charcot foot and severe pes plannus which is causing daily persistent pain over the midfoot with walking.  Recently went to orthotics and is in the process of having custom shoes made.  He is interested in seeing orthopedics to see if surgical intervention would be helpful for pain relief.  Has been evaluated by podiatry who felt the procedure he requires is out of their scope of practice.  Has been referred to [facility] urgent care by wound care but insurance was not accepted.

## 2020-01-09 NOTE — PROGRESS NOTES
Subjective:   Goran Chavez is a 64 y.o. male here today for hospital discharge follow up, requesting labs, foot pain    Hospital discharge follow-up  He was recently hospitalized at [facility] from 12/26/19 to 1/2/20 for infected lower extremity wounds.  Underwent debridment with wound care and is currently seeing them weekly.  Has an unna boot in place on his left lower leg.  Continues on doxycycline and cipro as recommended on hospital d/c.  Reports improvement in the wounds.  Denies fevers, chills, worsening leg pain.    History of prediabetes  While hospitalized, reports many people were asking if he is diabetic.  Last A1c was over one year ago but normal.  Previously, had mildly elevated A1c consistent with prediabetes.  No formal DM diagnosis.  He is requesting retesting today.    Charcot's joint of left foot, non-diabetic  Has a long history of L charcot foot and severe pes plannus which is causing daily persistent pain over the midfoot with walking.  Recently went to orthotics and is in the process of having custom shoes made.  He is interested in seeing orthopedics to see if surgical intervention would be helpful for pain relief.  Has been evaluated by podiatry who felt the procedure he requires is out of their scope of practice.  Has been referred to [facility] by wound care but insurance was not accepted.    Peripheral polyneuropathy (HCC)  Reports pain in both feet that is sharp an stabbing.  He believes there is a piece of glass embedded in his left foot.  Per patient's daughter, several years ago, there was attempted removal of the glass but they state it was not successful.  Imaging has failed to show any foreign body.  Was previously taking gabapentin.  He is a difficult historian and cannot tell me whether this was helpful, but he stopped it because he thought it was causing his memory to get worse.  Was prescribed oxycodone from the hospital which he has been taking for his  neuropathy since discharge.  Is very high risk for opiate use given concurrent ETOH use and methamphetamine use.  He and his daughter requested refills of the oxycodone but we discussed this is a poor long term option for the patient and I will not refill it.    Insomnia  Reports difficulty sleeping at night, states takes him several hrs to fall asleep some times.  Again, he is a very difficult historian and does not give me a clear history on this problem.  Not currently taking any OTC meds for sleep.    Other hyperlipidemia  Currently taking atorvastatin 40 mg daily.  Is due for repeat lipids.         Current medicines (including changes today)  Current Outpatient Medications   Medication Sig Dispense Refill   • amitriptyline (ELAVIL) 10 MG Tab Take 1 Tab by mouth every bedtime. 30 Tab 3   • ascorbic acid (VITAMIN C) 500 MG tablet Take 1 Tab by mouth 2 Times a Day. 30 Tab 3   • ciprofloxacin (CIPRO) 500 MG Tab Take 1 Tab by mouth every 12 hours. 10 Tab 0   • doxycycline monohydrate (ADOXA) 100 MG tablet Take 1 Tab by mouth every 12 hours. 10 Tab 0   • ferrous gluconate (FERGON) 324 (38 Fe) MG Tab Take 1 Tab by mouth 2 times a day, with meals. 60 Tab 1   • oxyCODONE immediate-release (ROXICODONE) 5 MG Tab Take 1 Tab by mouth every 6 hours as needed for up to 15 days. 30 Tab 0   • amLODIPine (NORVASC) 10 MG Tab Take 10 mg by mouth every day.     • atorvastatin (LIPITOR) 40 MG Tab Take 40 mg by mouth every evening.     • citalopram (CELEXA) 20 MG Tab Take 20 mg by mouth every day.       No current facility-administered medications for this visit.      He  has a past medical history of Anxiety, Charcot's joint of left foot, non-diabetic (3/21/2016), Chronic congestive heart failure (HCC) (11/16/2017), Hepatitis C, chronic (HCC), Hypertension, Migraine, Polysubstance abuse (HCC) (3/8/2018), Tobacco use (4/18/2016), Ulcer of left lower extremity with necrosis of muscle (HCC) (3/21/2016), and Venous stasis ulcer ([facility])  (2017).    ROS   Denies chest pain, shortness of breath  As above in HPI     Objective:     Vitals:    01/08/20 1641   BP: 140/90   Pulse: 86   Resp: 16   Temp: 36.4 °C (97.6 °F)   SpO2: 98%     Body mass index is 23.73 kg/m².   Physical Exam:  Constitutional: Alert, no distress.  Skin: Warm, dry, good turgor, unna boot over LLE was not removed but appears clean, dry, intact.  Skin over RLE is very dry and flaking with some erythema but no evidence of cellulitis, no open wounds.  Eye: Equal, round and reactive, conjunctiva clear, lids normal.  Respiratory: Unlabored respiratory effort, lungs clear to auscultation, no wheezes, no ronchi.  Cardiovascular: Regular rate and rhythm, no murmurs appreciated, 1+ pitting edema to mid tibia on RLE        Results and Imaging:   Lab Results   Component Value Date/Time    WBC 6.0 01/02/2020 04:01 AM    RBC 4.38 (L) 01/02/2020 04:01 AM    HEMOGLOBIN 13.5 (L) 01/02/2020 04:01 AM    HEMATOCRIT 43.5 01/02/2020 04:01 AM    MCV 99.3 (H) 01/02/2020 04:01 AM    MCH 30.8 01/02/2020 04:01 AM    MCHC 31.0 (L) 01/02/2020 04:01 AM    MPV 8.6 (L) 01/02/2020 04:01 AM    NEUTSPOLYS 65.40 12/27/2019 02:43 AM    LYMPHOCYTES 17.40 (L) 12/27/2019 02:43 AM    MONOCYTES 11.80 12/27/2019 02:43 AM    EOSINOPHILS 4.70 12/27/2019 02:43 AM    BASOPHILS 0.40 12/27/2019 02:43 AM    HYPOCHROMIA 1+ 06/14/2016 07:55 PM    ANISOCYTOSIS 2+ 04/10/2019 12:22 PM      Lab Results   Component Value Date/Time    CHOLSTRLTOT 106 12/19/2018 12:28 AM    LDL 43 12/19/2018 12:28 AM    HDL 43 12/19/2018 12:28 AM    TRIGLYCERIDE 102 12/19/2018 12:28 AM       Lab Results   Component Value Date/Time    SODIUM 137 01/02/2020 04:01 AM    POTASSIUM 5.0 01/02/2020 04:01 AM    CHLORIDE 105 01/02/2020 04:01 AM    CO2 25 01/02/2020 04:01 AM    GLUCOSE 92 01/02/2020 04:01 AM    BUN 29 (H) 01/02/2020 04:01 AM    CREATININE 1.42 (H) 01/02/2020 04:01 AM     Lab Results   Component Value Date/Time    ALKPHOSPHAT 48 12/28/2019 03:34 AM     ASTSGOT 23 12/28/2019 03:34 AM    ALTSGPT 19 12/28/2019 03:34 AM    TBILIRUBIN 0.4 12/28/2019 03:34 AM      Echo (12/28/19)  CONCLUSIONS  Normal left ventricular size, systolic function, and diastolic   function.  Left ventricular ejection fraction is visually estimated to be 55%.  Mild concentric left ventricular hypertrophy.  Normal left atrial size.  Mild mitral regurgitation.  No aortic insufficiency.  Mild aortic sclerosis without stenosis.  No aortic insufficiency.  Right ventricular systolic pressure is estimated to be 39 mmHg.  Mild tricuspid regurgitation.  Normal aortic root for body surface area.    BHUPINDER (12/27/19)   Vascular Laboratory   Conclusions   No evidence of arterial insufficiency in either lower extremity    CT left leg (12/26/19)  FINDINGS:  No acute bony destruction or fracture is seen. 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 No rim-enhancing abscess is seen.     No soft tissue gas     There is bullous change over the mid aspect of the leg anterolaterally. This measures up to 8 mm short axis. Additional bullous change seen distal to this which extends from the anteromedial to the lateral leg and this measures up to 10 mm short axis.   Greatest bullous changes seen chest above the medial malleolus level and this measures up to 11 mm short axis.     Underlying cutaneous layer is thickened.     There is some peroneus muscle fatty atrophy.     There is some edema between the gastrocnemius muscle bellies.     No abnormal effusion     Enhancement is confirmed into the foot excluding arterial occlusion. No venous thrombosis is seen. There is mild atherosclerosis     IMPRESSION:     Moderate to severe distal leg and medial ankle bullous change likely is infected.     Diffuse subcutaneous fat stranding and skin thickening could indicate bland edema or cellulitis and phlegmonous change.     No soft tissue gas or bony destruction is seen to  suggest deep infection    X ray L foot (12/19/18)  FINDINGS:     No acute fracture is seen. There is severe pes planus deformity identified with bony remodeling of the midfoot bony structures.     IMPRESSION:        1.  No acute traumatic bony injury.  2.  Severe pes planus deformity with remodeling of the midfoot bony structures. Appearance suggests Charcot joints.  Assessment and Plan:   The following treatment plan was discussed    1. Hospital discharge follow-up  Reviewed hospital course.  He has close follow up with wound care and will cont current abx until completion.  There have been no acute worsening symptoms to suggest reinfection  -cont antibiotic therapy and close wound care follow up    2. History of prediabetes  - HEMOGLOBIN A1C; Future    3. Other hyperlipidemia  -cont atorvastatin 40 mg daily  - Lipid Profile; Future    4. Elevated serum creatinine  New for patient since last hospitalization, will repeat labs  - Comp Metabolic Panel; Future    5. Charcot's joint of left foot, non-diabetic  Discussed that he may or may not be a candidate for surgical intervention.  Referral to [facility] placed.  Encouraged him to wear his orthopedic shoes once he gets them  -orthopedic shoes  - REFERRAL TO ORTHOPEDICS    6. Peripheral polyneuropathy (HCC)  He declines to restart gabapentin.  Will try low dose elavil given also comorbid insomnia, may uptitrate at next visit if needed.  Poor candidate for opiate therapy and I declined to refill his script.  - amitriptyline (ELAVIL) 10 MG Tab; Take 1 Tab by mouth every bedtime.  Dispense: 30 Tab; Refill: 3  -f/u 6-8 wks    7. Insomnia, unspecified type  - amitriptyline (ELAVIL) 10 MG Tab; Take 1 Tab by mouth every bedtime.  Dispense: 30 Tab; Refill: 3        Followup: Return in about 2 months (around 3/8/2020), or if symptoms worsen or fail to improve.

## 2020-01-09 NOTE — ASSESSMENT & PLAN NOTE
Reports difficulty sleeping at night, states takes him several hrs to fall asleep some times.  Again, he is a very difficult historian and does not give me a clear history on this problem.  Not currently taking any OTC meds for sleep.

## 2020-01-09 NOTE — ASSESSMENT & PLAN NOTE
While hospitalized, reports many people were asking if he is diabetic.  Last A1c was over one year ago but normal.  Previously, had mildly elevated A1c consistent with prediabetes.  No formal DM diagnosis.  He is requesting retesting today.

## 2020-01-09 NOTE — ASSESSMENT & PLAN NOTE
He was recently hospitalized at [facility] from 12/26/19 to 1/2/20 for infected lower extremity wounds.  Underwent debridment with wound care and is currently seeing them weekly.  Has an unna boot in place on his left lower leg.  Continues on doxycycline and cipro as recommended on hospital d/c.  Reports improvement in the wounds.  Denies fevers, chills, worsening leg pain.

## 2020-01-14 ENCOUNTER — OFFICE VISIT (OUTPATIENT)
Dept: WOUND CARE | Facility: MEDICAL CENTER | Age: 65
End: 2020-01-14
Attending: HOSPITALIST
Payer: MEDICAID

## 2020-01-14 VITALS
OXYGEN SATURATION: 94 % | HEART RATE: 90 BPM | TEMPERATURE: 98.1 F | DIASTOLIC BLOOD PRESSURE: 101 MMHG | SYSTOLIC BLOOD PRESSURE: 139 MMHG | RESPIRATION RATE: 20 BRPM

## 2020-01-14 DIAGNOSIS — I87.8 CHRONIC VENOUS STASIS: ICD-10-CM

## 2020-01-14 DIAGNOSIS — L08.9 WOUND INFECTION: ICD-10-CM

## 2020-01-14 DIAGNOSIS — T14.8XXA WOUND INFECTION: ICD-10-CM

## 2020-01-14 DIAGNOSIS — L97.922 SKIN ULCER OF LEFT LOWER LEG WITH FAT LAYER EXPOSED (HCC): ICD-10-CM

## 2020-01-14 DIAGNOSIS — M21.962 ACQUIRED DEFORMITY OF LEFT ANKLE AND FOOT: ICD-10-CM

## 2020-01-14 PROCEDURE — 99213 OFFICE O/P EST LOW 20 MIN: CPT | Mod: 25 | Performed by: NURSE PRACTITIONER

## 2020-01-14 PROCEDURE — 11042 DBRDMT SUBQ TIS 1ST 20SQCM/<: CPT | Performed by: NURSE PRACTITIONER

## 2020-01-14 PROCEDURE — 11042 DBRDMT SUBQ TIS 1ST 20SQCM/<: CPT

## 2020-01-14 RX ORDER — ATORVASTATIN CALCIUM 40 MG/1
40 TABLET, FILM COATED ORAL DAILY
Qty: 30 TAB | Refills: 11 | Status: SHIPPED | OUTPATIENT
Start: 2020-01-14 | End: 2021-02-03

## 2020-01-14 RX ORDER — PROPRANOLOL HYDROCHLORIDE 10 MG/1
10 TABLET ORAL 2 TIMES DAILY
Qty: 60 TAB | Refills: 5 | Status: SHIPPED
Start: 2020-01-14 | End: 2020-03-12

## 2020-01-14 ASSESSMENT — ENCOUNTER SYMPTOMS
CHILLS: 0
DIZZINESS: 0
FEVER: 0
DEPRESSION: 0
WEIGHT LOSS: 0
VOMITING: 0
SHORTNESS OF BREATH: 0
HEADACHES: 0
MYALGIAS: 0
NAUSEA: 0
DIARRHEA: 0
ABDOMINAL PAIN: 0
COUGH: 0

## 2020-01-14 ASSESSMENT — LIFESTYLE VARIABLES: SUBSTANCE_ABUSE: 0

## 2020-01-14 NOTE — TELEPHONE ENCOUNTER
Was the patient seen in the last year in this department? Yes    Does patient have an active prescription for medications requested? No     Received Request Via: Patient       Pt and his daughter Megan called and stated that upon discharge 3 medications were cancelled by the hospitalists. Megan and pt were under the impression that only one of pt's meds were cancelled. Megan and pt states that pt needs refills of atorvastatin and propranolol. Megan states that she wants to make sure her father is on the correct meds- please review and advise, cb number is [phone]

## 2020-01-14 NOTE — PROGRESS NOTES
Provider Encounter- Full Thickness wound    HISTORY OF PRESENT ILLNESS  Wound History:    START OF CARE IN CLINIC: 1/7/20               REFERRING PROVIDER: CELE REYES                 WOUND ETIOLOGY: venous              LOCATION: Left lower leg              HISTORY: Patient was admitted to [facility] 12/26/19 for LLE blisters filled with yellow pus and pain. Past medical history of CHF, hypertension, anxiety, tobacco abuse; history of chronic poorly healing wounds due to peripheral vascular disease. Patient has been seen at [facility] clinic 2016, 2018 & last discharged from [facility] wound end of 2019.     Pertinent Medical History: has a past medical history of Anxiety, Charcot's joint of left foot, non-diabetic (3/21/2016), Chronic congestive heart failure (HCC) (11/16/2017), Hepatitis C, chronic (HCC), Hypertension, Migraine, Polysubstance abuse (HCC) (3/8/2018), Tobacco use (4/18/2016), Ulcer of left lower extremity with necrosis of muscle (HCC) (3/21/2016), and Venous stasis ulcer ([facility]) (2017).              ETIOLOGY HISTORY:  Vascular Surgeon: Dr. White. Compression Circ-aid. Varicose Veins none visible     TOBACCO USE: Patient denies; patient also denies alcohol and recreational drug use    Patient's problem list, allergies, and current medications reviewed and updated in Epic    Interval History:  1/14/2020. Initial clinic visit with KEV Coyne, JORDAN-BC. Patient reports feeling well.  He states wound has been present since hospitalization 12/26/19. He does note improvement. Denies wound drainage, odor. Denies erythema, swelling, pain. Pt is not a diabetic. Denies current alcohol, tobacco, or drug use. Pt stating that he has upcoming FU appt with [facility] (next week) to FU on a recent procedure he had to his LLE. He can not recall the procedure, but states it was done in office last week.  He also has an upcoming appt with [facility] as referred by PCP due to Charcot.     REVIEW  OF SYSTEMS:   Review of Systems   Constitutional: Negative for chills, fever, malaise/fatigue and weight loss.   Respiratory: Negative for cough and shortness of breath.    Cardiovascular: Positive for leg swelling. Negative for chest pain.        Chronic   Gastrointestinal: Negative for abdominal pain, diarrhea, nausea and vomiting.   Musculoskeletal: Positive for joint pain. Negative for myalgias.        Chronic   Skin: Negative for itching and rash.   Neurological: Negative for dizziness and headaches.   Psychiatric/Behavioral: Negative for depression and substance abuse.       PHYSICAL EXAMINATION:   /101   Pulse 90   Temp 36.7 °C (98.1 °F)   Resp 20   SpO2 94%     Physical Exam   Constitutional: He is oriented to person, place, and time and well-developed, well-nourished, and in no distress. No distress.   HENT:   Head: Normocephalic and atraumatic.   Eyes: Pupils are equal, round, and reactive to light. Conjunctivae and EOM are normal.   Neck: Normal range of motion. Neck supple.   Cardiovascular: Normal rate and intact distal pulses.   Pulmonary/Chest: Effort normal. No respiratory distress.   Musculoskeletal: Normal range of motion.         General: Deformity and edema present.      Comments: Left charcot foot  1+pitting edema to bilateral LE   Neurological: He is alert and oriented to person, place, and time.   Skin: Skin is warm and dry.   See wound care assessment/photos   Psychiatric: Affect and judgment normal.       WOUND ASSESSMENT     Wound 01/07/20 Partial Thickness Wound Pretibial Left lower leg anterior (Active)   Wound Image   1/14/2020  3:00 PM   Site Assessment Epithelialization 1/14/2020  3:00 PM   Marylou-wound Assessment Edema 1/14/2020  3:00 PM   Margins Attached edges 1/7/2020  1:00 PM   Wound Width (cm) 4 cm 1/7/2020  1:00 PM   Wound Depth (cm) 0.1 cm 1/7/2020  1:00 PM   Wound Surface Area (cm^2) 12 cm^2 1/7/2020  1:00 PM   Post Wound Length (cm) 3 cm 1/7/2020  1:00 PM    Post  Wound Width (cm) 4 cm 1/7/2020  1:00 PM   Post Wound Depth (cm) 0.1 cm 1/7/2020  1:00 PM   Post Wound Surface Area (cm^2) 12 cm^2 1/7/2020  1:00 PM   Tunneling 0 cm 1/7/2020  1:00 PM   Undermining 0 cm 1/7/2020  1:00 PM   Closure Secondary intention 1/7/2020  1:00 PM   Drainage Amount None 1/14/2020  3:00 PM   Drainage Description Serous;Yellow 1/7/2020  1:00 PM   Non-staged Wound Description Partial thickness 1/7/2020  1:00 PM   Treatments Cleansed 1/14/2020  3:00 PM   Cleansing Approved Wound Cleanser 1/14/2020  3:00 PM   Periwound Protectant Barrier Paste 1/7/2020  1:00 PM   Dressing Options Compression Wrap Two Layer;Viscopaste;Other (Comments) 1/14/2020  3:00 PM   Dressing Cleansing/Solutions Not Applicable 1/14/2020  3:00 PM   Dressing Changed New 1/14/2020  3:00 PM   Dressing Status Clean;Dry;Intact 1/14/2020  3:00 PM   Dressing Change Frequency Weekly 1/14/2020  3:00 PM   WOUND NURSE ONLY - Odor None 1/14/2020  3:00 PM   WOUND NURSE ONLY - Pulses 2+;Left;DP;PT 1/7/2020  1:00 PM   WOUND NURSE ONLY - Exposed Structures None 1/14/2020  3:00 PM   WOUND NURSE ONLY - Tissue Type and Percentage 100% epithelialization 1/14/2020  3:00 PM       Wound 01/07/20 Full Thickness Wound Tibia Left lower leg posterior medial (Active)   Wound Image    1/14/2020  3:00 PM   Site Assessment Red;Yellow 1/14/2020  3:00 PM   Marylou-wound Assessment Edema 1/14/2020  3:00 PM   Margins Attached edges 1/14/2020  3:00 PM   Wound Length (cm) 1.9 cm 1/14/2020  3:00 PM   Wound Width (cm) 2.5 cm 1/14/2020  3:00 PM   Wound Surface Area (cm^2) 4.75 cm^2 1/14/2020  3:00 PM   Post Wound Length (cm) 2 cm 1/14/2020  3:00 PM    Post Wound Width (cm) 2.5 cm 1/14/2020  3:00 PM   Post Wound Depth (cm) 0.1 cm 1/14/2020  3:00 PM   Post Wound Surface Area (cm^2) 5 cm^2 1/14/2020  3:00 PM   Tunneling 0 cm 1/14/2020  3:00 PM   Undermining 0 cm 1/14/2020  3:00 PM   Closure Secondary intention 1/14/2020  3:00 PM   Drainage Amount Moderate 1/14/2020  3:00 PM    Drainage Description Yellow;Serous 1/14/2020  3:00 PM   Non-staged Wound Description Full thickness 1/14/2020  3:00 PM   Treatments Cleansed;Pharmaceutical agent;Other (Comment) 1/14/2020  3:00 PM   Cleansing Approved Wound Cleanser 1/14/2020  3:00 PM   Periwound Protectant Barrier Paste;Skin Moisturizer 1/14/2020  3:00 PM   Dressing Options Hydrofiber Silver;Calcium Alginate;Compression Wrap Two Layer 1/14/2020  3:00 PM   Dressing Cleansing/Solutions Other (Comments) 1/14/2020  3:00 PM   Dressing Changed New 1/14/2020  3:00 PM   Dressing Status Dry;Clean;Intact 1/14/2020  3:00 PM   Dressing Change Frequency Weekly 1/14/2020  3:00 PM   WOUND NURSE ONLY - Odor None 1/14/2020  3:00 PM   WOUND NURSE ONLY - Pulses Left;2+;PT;DP 1/7/2020  1:00 PM   WOUND NURSE ONLY - Exposed Structures None 1/14/2020  3:00 PM   WOUND NURSE ONLY - Tissue Type and Percentage pre: 60% yellow, 40% red 1/14/2020  3:00 PM       Wound 01/07/20 Full Thickness Wound Tibia Left lower leg posterior lateral (Active)   Wound Image    1/14/2020  3:00 PM   Site Assessment Pink;Yellow 1/14/2020  3:00 PM   Marylou-wound Assessment Intact;Edema;Yellow-brown 1/14/2020  3:00 PM   Margins Attached edges 1/14/2020  3:00 PM   Wound Length (cm) 4.7 cm 1/14/2020  3:00 PM   Wound Width (cm) 0.8 cm 1/14/2020  3:00 PM   Wound Depth (cm) 0.1 cm 1/14/2020  3:00 PM   Wound Surface Area (cm^2) 3.76 cm^2 1/14/2020  3:00 PM   Post Wound Length (cm) 4.7 cm 1/14/2020  3:00 PM    Post Wound Width (cm) 0.9 cm 1/14/2020  3:00 PM   Post Wound Depth (cm) 0.1 cm 1/14/2020  3:00 PM   Post Wound Surface Area (cm^2) 4.23 cm^2 1/14/2020  3:00 PM   Tunneling 0 cm 1/14/2020  3:00 PM   Undermining 0 cm 1/14/2020  3:00 PM   Closure Secondary intention 1/14/2020  3:00 PM   Drainage Amount Moderate 1/14/2020  3:00 PM   Drainage Description Yellow;Serous 1/14/2020  3:00 PM   Non-staged Wound Description Full thickness 1/14/2020  3:00 PM   Treatments Cleansed;Pharmaceutical agent;Other  (Comment) 1/14/2020  3:00 PM   Cleansing Approved Wound Cleanser 1/14/2020  3:00 PM   Periwound Protectant Barrier Paste;Skin Moisturizer 1/14/2020  3:00 PM   Dressing Options Hydrofiber Silver;Calcium Alginate;Compression Wrap Two Layer 1/14/2020  3:00 PM   Dressing Cleansing/Solutions Other (Comments) 1/14/2020  3:00 PM   Dressing Changed New 1/14/2020  3:00 PM   Dressing Status Clean;Dry;Intact 1/14/2020  3:00 PM   Dressing Change Frequency Weekly 1/14/2020  3:00 PM   WOUND NURSE ONLY - Odor None 1/14/2020  3:00 PM   WOUND NURSE ONLY - Pulses 2+;Left;DP;PT 1/7/2020  1:00 PM   WOUND NURSE ONLY - Exposed Structures None 1/14/2020  3:00 PM   WOUND NURSE ONLY - Tissue Type and Percentage pre: 80% pink, 20% yellow 1/14/2020  3:00 PM            PROCEDURE:   -2% viscous lidocaine applied topically to wound bed for approximately 5 minutes prior to debridement  -Curette used to debride wound bed.  Excisional debridement was performed to remove devitalized tissue until healthy, bleeding tissue was visualized.   Approximately 15cm2 debrided.  Tissue debrided into the subcutaneous layer.    -Bleeding controlled with manual pressure.    -Wound care completed by wound RN, refer to flowsheet  -Patient tolerated the procedure well, without c/o pain or discomfort.       Pertinent Labs and Diagnostics:    Labs:     A1c:   Lab Results   Component Value Date/Time    HBA1C 5.5 12/22/2018 12:36 AM          VASCULAR STUDIES: BHUPINDER 12/27/2019   Right.    Doppler waveform of the common femoral artery is of high amplitude and    triphasic.    Doppler waveforms at the ankle are brisk and triphasic.    Ankle-brachial index is normal.       Left.    Could not perform ankle-brachial index due to large blisters/ulcers.   Normal toe-brachial index: 0.94   Doppler waveform of the common femoral artery is of high amplitude and    triphasic.    Biphasic waveforms seen at the ankle.     No recent venous studies in EPIC- will request records from [facility]  [place]    LAST  WOUND CULTURE:  DATE : 12/26/2019 +Serratia marcescens, pseudomonas aeruginosa, Group B Strep        ASSESSMENT AND PLAN:   1. Skin ulcer of left lower leg with fat layer exposed (HCC)  1/14/20: Initial provider visit. Recurrent problem/wound  -Excisional debridement of posterior in clinic today, medically necessary to promote wound healing.  -2 layer compression to manage edema, and also to prevent patient from manipulating his wounds  -He is to return to clinic weekly for assessment, debridement, and compression     Wound care: Silver Hydrofiber to manage exudate and bioburden, foam cover dressing, Hypafix tape      2. Chronic venous stasis  Pt stating that he is established at [facility] and had a recent procedure done. Pt unable to provde any further information. Pt stating that he has a FU appt next week scheduled at [facility]. Will request records.       3. Acquired deformity of left ankle and foot  Per patient his PCP has referred him to [facility] (in [place]) as well as [facility] for custom shoes and inserts. Encouraged patient to FU with both of these referrals.   I discussed foot care with the patient as he reports some LOPS. Do not go barefoot. Check feet everyday. Check the inside of shoes before putting them on. Come to clinic immediately with foot injury or signs of infection.      4. Wound infection  Pt received IV Zosyn while in the hospital. Discharged home on oral Cipro and Doxy. Pt stating that he completed without adverse effect.   No s/s of infection today. Will continue to monitor closely.     PATIENT EDUCATION  - Importance of adequate nutrition for wound healing  -Advised to go to ER for any increased redness, swelling, drainage, or odor, or if patient develops fever, chills, nausea or vomiting.     15 min spent face to face with patient, >50% of time spent counseling, coordinating care, reviewing records, discussing POC, educating patient regarding wound healing and  progression.  This time was spent in excess to procedure time.       Please note that this note may have been created using voice recognition software. I have worked with technical experts from [facility] to optimize the interface.  I have made every reasonable attempt to correct obvious errors, but there may be errors of grammar and possibly content that I did not discover before finalizing the note.    N

## 2020-01-15 NOTE — PATIENT INSTRUCTIONS
-Keep your wound dressing clean, dry, and intact.    -Change your dressing if it becomes soiled, soaked, or falls off.    -Remove your compression wrap if you have severe pain, severe swelling, numbness, color change, or temperature change in your toes. If you need to remove your compression wrap, do so by unrolling it. Do not cut the compression wrap off to prevent cutting yourself on accident.    -Should you experience any significant changes in your wound(s), such as infection (redness, swelling, localized heat, increased pain, fever > 101 F, chills) or have any questions regarding your home care instructions, please contact the wound center at [phone]. If after hours, contact your primary care physician or go to the hospital emergency room.

## 2020-01-21 ENCOUNTER — OFFICE VISIT (OUTPATIENT)
Dept: WOUND CARE | Facility: MEDICAL CENTER | Age: 65
End: 2020-01-21
Attending: HOSPITALIST
Payer: MEDICAID

## 2020-01-21 PROCEDURE — 97597 DBRDMT OPN WND 1ST 20 CM/<: CPT

## 2020-01-22 NOTE — PROCEDURES
2% Viscous lidocaine applied to wound and periwound 10 minutes dwell time.   CSWD using curette to remove approx. ~5cm2 of nonviable tissue from all wound beds.

## 2020-01-22 NOTE — PATIENT INSTRUCTIONS
Avoid prolonged standing or sitting without elevating your legs.    - Multilayer compression wrap to left leg. Do not get wet and keep on for the week. Only remove if temperature or sensation changes.    Keep dressing clean and dry and cover while bathing. Only change dressing if over saturated, soiled or its falling off.     Should you experience any significant changes in your wound(s) such as infection (redness, swelling, localized heat, increased pain, fever >101 F, chills) or have any questions regarding your home care instructions, please contact the wound center [phone]. If after hours, contact your primary care physician or go the hospital emergency room.

## 2020-01-23 ENCOUNTER — HOSPITAL ENCOUNTER (OUTPATIENT)
Dept: LAB | Facility: MEDICAL CENTER | Age: 65
End: 2020-01-23
Attending: INTERNAL MEDICINE
Payer: MEDICAID

## 2020-01-23 DIAGNOSIS — E78.49 OTHER HYPERLIPIDEMIA: ICD-10-CM

## 2020-01-23 DIAGNOSIS — Z87.898 HISTORY OF PREDIABETES: ICD-10-CM

## 2020-01-23 DIAGNOSIS — R79.89 ELEVATED SERUM CREATININE: ICD-10-CM

## 2020-01-23 LAB
ALBUMIN SERPL BCP-MCNC: 4.1 G/DL (ref 3.2–4.9)
ALBUMIN/GLOB SERPL: 1.1 G/DL
ALP SERPL-CCNC: 58 U/L (ref 30–99)
ALT SERPL-CCNC: 13 U/L (ref 2–50)
ANION GAP SERPL CALC-SCNC: 8 MMOL/L (ref 0–11.9)
AST SERPL-CCNC: 20 U/L (ref 12–45)
BILIRUB SERPL-MCNC: 0.5 MG/DL (ref 0.1–1.5)
BUN SERPL-MCNC: 30 MG/DL (ref 8–22)
CALCIUM SERPL-MCNC: 9.5 MG/DL (ref 8.5–10.5)
CHLORIDE SERPL-SCNC: 107 MMOL/L (ref 96–112)
CHOLEST SERPL-MCNC: 112 MG/DL (ref 100–199)
CO2 SERPL-SCNC: 26 MMOL/L (ref 20–33)
CREAT SERPL-MCNC: 1.15 MG/DL (ref 0.5–1.4)
EST. AVERAGE GLUCOSE BLD GHB EST-MCNC: 117 MG/DL
GLOBULIN SER CALC-MCNC: 3.6 G/DL (ref 1.9–3.5)
GLUCOSE SERPL-MCNC: 93 MG/DL (ref 65–99)
HBA1C MFR BLD: 5.7 % (ref 0–5.6)
HDLC SERPL-MCNC: 57 MG/DL
LDLC SERPL CALC-MCNC: 49 MG/DL
POTASSIUM SERPL-SCNC: 4.9 MMOL/L (ref 3.6–5.5)
PROT SERPL-MCNC: 7.7 G/DL (ref 6–8.2)
SODIUM SERPL-SCNC: 141 MMOL/L (ref 135–145)
TRIGL SERPL-MCNC: 32 MG/DL (ref 0–149)

## 2020-01-23 PROCEDURE — 83036 HEMOGLOBIN GLYCOSYLATED A1C: CPT

## 2020-01-23 PROCEDURE — 80061 LIPID PANEL: CPT

## 2020-01-23 PROCEDURE — 36415 COLL VENOUS BLD VENIPUNCTURE: CPT

## 2020-01-23 PROCEDURE — 80053 COMPREHEN METABOLIC PANEL: CPT

## 2020-01-28 ENCOUNTER — OFFICE VISIT (OUTPATIENT)
Dept: WOUND CARE | Facility: MEDICAL CENTER | Age: 65
End: 2020-01-28
Attending: HOSPITALIST
Payer: MEDICAID

## 2020-01-28 VITALS
DIASTOLIC BLOOD PRESSURE: 98 MMHG | OXYGEN SATURATION: 99 % | HEART RATE: 83 BPM | TEMPERATURE: 97.7 F | RESPIRATION RATE: 16 BRPM | SYSTOLIC BLOOD PRESSURE: 149 MMHG

## 2020-01-28 PROCEDURE — 11042 DBRDMT SUBQ TIS 1ST 20SQCM/<: CPT | Performed by: NURSE PRACTITIONER

## 2020-01-28 PROCEDURE — 11042 DBRDMT SUBQ TIS 1ST 20SQCM/<: CPT

## 2020-01-28 ASSESSMENT — ENCOUNTER SYMPTOMS
CHILLS: 0
FEVER: 0
WEIGHT LOSS: 0
DIZZINESS: 0
NAUSEA: 0
MYALGIAS: 0
DEPRESSION: 0
HEADACHES: 0
DIARRHEA: 0
ABDOMINAL PAIN: 0
COUGH: 0
SHORTNESS OF BREATH: 0
VOMITING: 0

## 2020-01-28 ASSESSMENT — LIFESTYLE VARIABLES: SUBSTANCE_ABUSE: 0

## 2020-01-28 NOTE — PROGRESS NOTES
Provider Encounter- Full Thickness wound    HISTORY OF PRESENT ILLNESS  Wound History:    START OF CARE IN CLINIC: 1/7/20               REFERRING PROVIDER: CELE REYES                 WOUND ETIOLOGY: venous              LOCATION: Left lower leg              HISTORY: Patient was admitted to [facility] 12/26/19 for LLE blisters filled with yellow pus and pain. Past medical history of CHF, hypertension, anxiety, tobacco abuse; history of chronic poorly healing wounds due to peripheral vascular disease. Patient has been seen at [facility] clinic 2016, 2018 & last discharged from [facility] wound end of 2019.     Pertinent Medical History: has a past medical history of Anxiety, Charcot's joint of left foot, non-diabetic (3/21/2016), Chronic congestive heart failure (HCC) (11/16/2017), Hepatitis C, chronic (HCC), Hypertension, Migraine, Polysubstance abuse (HCC) (3/8/2018), Tobacco use (4/18/2016), Ulcer of left lower extremity with necrosis of muscle (HCC) (3/21/2016), and Venous stasis ulcer ([facility]) (2017).              ETIOLOGY HISTORY:  Vascular Surgeon: Dr. White. Compression Circ-aid. Varicose Veins none visible     TOBACCO USE: Patient denies; patient also denies alcohol and recreational drug use    Patient's problem list, allergies, and current medications reviewed and updated in Epic    Interval History:  1/14/2020. Initial clinic visit with KEV Coyne, [facility]. Patient reports feeling well.  He states wound has been present since hospitalization 12/26/19. He does note improvement. Denies wound drainage, odor. Denies erythema, swelling, pain. Pt is not a diabetic. Denies current alcohol, tobacco, or drug use. Pt stating that he has upcoming FU appt with [facility] (next week) to FU on a recent procedure he had to his LLE. He can not recall the procedure, but states it was done in office last week.  He also has an upcoming appt with [facility] as referred by PCP due to Charcot.     1/28/20:  Clinic visit with KEV Coyne, [facility]. Patient reports feeling well.  He is happy with wound progress and feels the wound is improving.  Denies wound drainage, odor. Denies erythema, swelling, pain. Pt is not a diabetic. Denies current alcohol, tobacco, or drug use. Pt followed by [facility].  He also has been referred to [facility] by PCP due to Charcot, but has not yet followed up or scheduled with them.      REVIEW OF SYSTEMS:   Review of Systems   Constitutional: Negative for chills, fever, malaise/fatigue and weight loss.   Respiratory: Negative for cough and shortness of breath.    Cardiovascular: Positive for leg swelling. Negative for chest pain.        Chronic   Gastrointestinal: Negative for abdominal pain, diarrhea, nausea and vomiting.   Musculoskeletal: Positive for joint pain. Negative for myalgias.        Chronic   Skin: Negative for itching and rash.   Neurological: Negative for dizziness and headaches.   Psychiatric/Behavioral: Negative for depression and substance abuse.       PHYSICAL EXAMINATION:   /98   Pulse 83   Temp 36.5 °C (97.7 °F)   Resp 16   SpO2 99%     Physical Exam   Constitutional: He is oriented to person, place, and time and well-developed, well-nourished, and in no distress. No distress.   HENT:   Head: Normocephalic and atraumatic.   Eyes: Pupils are equal, round, and reactive to light. Conjunctivae and EOM are normal.   Neck: Normal range of motion. Neck supple.   Cardiovascular: Normal rate and intact distal pulses.   Pulmonary/Chest: Effort normal. No respiratory distress.   Musculoskeletal: Normal range of motion.         General: Deformity and edema present.      Comments: Left charcot foot  1+pitting edema to bilateral LE   Neurological: He is alert and oriented to person, place, and time.   Skin: Skin is warm and dry.   See wound care assessment/photos   Psychiatric: Affect and judgment normal.       WOUND ASSESSMENT     Wound 01/07/20 Full Thickness  Wound Tibia Left lower leg posterior medial (Active)   Wound Image    1/28/2020  3:00 PM   Site Assessment Red;Pink;Other (Comment) 1/28/2020  3:00 PM   Marylou-wound Assessment Edema;Intact 1/28/2020  3:00 PM   Margins Attached edges 1/28/2020  3:00 PM   Wound Length (cm) 1 cm 1/28/2020  3:00 PM   Wound Width (cm) 1.1 cm 1/28/2020  3:00 PM   Wound Depth (cm) 0.1 cm 1/28/2020  3:00 PM   Wound Surface Area (cm^2) 1.1 cm^2 1/28/2020  3:00 PM   Post Wound Length (cm) 1.2 cm 1/28/2020  3:00 PM    Post Wound Width (cm) 1.5 cm 1/28/2020  3:00 PM   Post Wound Depth (cm) 0.1 cm 1/28/2020  3:00 PM   Post Wound Surface Area (cm^2) 1.8 cm^2 1/28/2020  3:00 PM   Tunneling 0 cm 1/28/2020  3:00 PM   Undermining 0 cm 1/28/2020  3:00 PM   Closure Secondary intention 1/28/2020  3:00 PM   Drainage Amount Moderate 1/28/2020  3:00 PM   Drainage Description Serosanguineous 1/28/2020  3:00 PM   Non-staged Wound Description Full thickness 1/28/2020  3:00 PM   Treatments Cleansed;Pharmaceutical agent;Other (Comment) 1/28/2020  3:00 PM   Cleansing Approved Wound Cleanser 1/28/2020  3:00 PM   Periwound Protectant Barrier Paste;Skin Moisturizer 1/28/2020  3:00 PM   Dressing Options Hydrofiber Silver;Compression Wrap Two Layer;Other (Comments) 1/28/2020  3:00 PM   Dressing Cleansing/Solutions Normal Saline 1/28/2020  3:00 PM   Dressing Changed Changed 1/28/2020  3:00 PM   Dressing Status Clean;Dry;Intact 1/28/2020  3:00 PM   Dressing Change Frequency Weekly 1/28/2020  3:00 PM   WOUND NURSE ONLY - Odor None 1/28/2020  3:00 PM   WOUND NURSE ONLY - Pulses Left;2+;PT;DP 1/7/2020  1:00 PM   WOUND NURSE ONLY - Exposed Structures None 1/28/2020  3:00 PM   WOUND NURSE ONLY - Tissue Type and Percentage pre: 50% pink/red, 50% scab 1/28/2020  3:00 PM       Wound 01/07/20 Full Thickness Wound Tibia Left lower leg posterior lateral (Active)   Wound Image    1/28/2020  3:00 PM   Site Assessment Red;Yellow 1/28/2020  3:00 PM   Marylou-wound Assessment  Intact;Edema;Yellow-brown 1/28/2020  3:00 PM   Margins Attached edges 1/28/2020  3:00 PM   Wound Length (cm) 4.9 cm 1/28/2020  3:00 PM   Wound Width (cm) 1 cm 1/28/2020  3:00 PM   Wound Depth (cm) 0.1 cm 1/28/2020  3:00 PM   Wound Surface Area (cm^2) 4.9 cm^2 1/28/2020  3:00 PM   Post Wound Length (cm) 4.9 cm 1/28/2020  3:00 PM    Post Wound Width (cm) 1.1 cm 1/28/2020  3:00 PM   Post Wound Depth (cm) 0.1 cm 1/28/2020  3:00 PM   Post Wound Surface Area (cm^2) 5.39 cm^2 1/28/2020  3:00 PM   Tunneling 0 cm 1/28/2020  3:00 PM   Undermining 0 cm 1/28/2020  3:00 PM   Closure Secondary intention 1/28/2020  3:00 PM   Drainage Amount Moderate 1/28/2020  3:00 PM   Drainage Description Serosanguineous 1/28/2020  3:00 PM   Non-staged Wound Description Full thickness 1/28/2020  3:00 PM   Treatments Cleansed;Pharmaceutical agent;Other (Comment) 1/28/2020  3:00 PM   Cleansing Approved Wound Cleanser 1/28/2020  3:00 PM   Periwound Protectant Barrier Paste;Skin Moisturizer 1/28/2020  3:00 PM   Dressing Options Hydrofiber Silver;Compression Wrap Two Layer;Other (Comments) 1/28/2020  3:00 PM   Dressing Cleansing/Solutions Normal Saline 1/28/2020  3:00 PM   Dressing Changed Changed 1/28/2020  3:00 PM   Dressing Status Clean;Dry;Intact 1/28/2020  3:00 PM   Dressing Change Frequency Weekly 1/28/2020  3:00 PM   WOUND NURSE ONLY - Odor None 1/28/2020  3:00 PM   WOUND NURSE ONLY - Pulses 2+;Left;DP;PT 1/7/2020  1:00 PM   WOUND NURSE ONLY - Exposed Structures None 1/28/2020  3:00 PM   WOUND NURSE ONLY - Tissue Type and Percentage pre: 60% red, 40% yellow 1/28/2020  3:00 PM            PROCEDURE:   -2% viscous lidocaine applied topically to wound bed for approximately 5 minutes prior to debridement  -Curette used to debride wound bed.  Excisional debridement was performed to remove devitalized tissue until healthy, bleeding tissue was visualized.   Total wound surface area 7.19cm2 debrided.  Tissue debrided into the subcutaneous layer.     -Bleeding controlled with manual pressure.    -Wound care completed by wound RN, refer to flowsheet  -Patient tolerated the procedure well, without c/o pain or discomfort.       Pertinent Labs and Diagnostics:    Labs:     A1c:   Lab Results   Component Value Date/Time    HBA1C 5.7 (H) 01/23/2020 11:22 AM          VASCULAR STUDIES: BHUPINDER 12/27/2019   Right.    Doppler waveform of the common femoral artery is of high amplitude and    triphasic.    Doppler waveforms at the ankle are brisk and triphasic.    Ankle-brachial index is normal.       Left.    Could not perform ankle-brachial index due to large blisters/ulcers.   Normal toe-brachial index: 0.94   Doppler waveform of the common femoral artery is of high amplitude and    triphasic.    Biphasic waveforms seen at the ankle.     No recent venous studies in [facility]- will request records from [facility]    LAST  WOUND CULTURE:  DATE : 12/26/2019 +Serratia marcescens, pseudomonas aeruginosa, Group B Strep        ASSESSMENT AND PLAN:   1. Skin ulcer of left lower leg with fat layer exposed (HCC)  1/28/20: Recurrent problem/wound  -Excisional debridement of posterior in clinic today, medically necessary to promote wound healing.  -2 layer compression to manage edema, and also to prevent patient from manipulating his wounds  -He is to return to clinic weekly for assessment, debridement, and compression     Wound care: Silver Hydrofiber to manage exudate and bioburden, foam cover dressing, Hypafix tape      2. Chronic venous stasis  Comments: Pt established at [facility] and had a recent procedure done. Pt unable to provde any further information.  Records have been requested but I do not see them scanned into [facility] yet.  RN to follow-up with this      3. Acquired deformity of left ankle and foot  Per patient his PCP has referred him to Orthopedics and [facility] for custom shoes and inserts. Encouraged patient to FU with both of these referrals.     4. Wound infection  Pt  received IV Zosyn while in the hospital. Discharged home on oral Cipro and Doxy.  Which he subsequently completed as prescribed  1/28/20: No s/s of infection today. Will continue to monitor at all clinic visits.    PATIENT EDUCATION  - Importance of adequate nutrition for wound healing  -Advised to go to ER for any increased redness, swelling, drainage, or odor, or if patient develops fever, chills, nausea or vomiting.         Please note that this note may have been created using voice recognition software. I have worked with technical experts from [employer] to optimize the interface.  I have made every reasonable attempt to correct obvious errors, but there may be errors of grammar and possibly content that I did not discover before finalizing the note.    N

## 2020-01-29 NOTE — PATIENT INSTRUCTIONS
-Keep your wound dressing clean, dry, and intact.    -Change your dressing if it becomes soiled, soaked, or falls off.    -Remove your compression wrap if you have severe pain, severe swelling, numbness, color change, or temperature change in your toes. If you need to remove your compression wrap, do so by unrolling it. Do not cut the compression wrap off to prevent cutting yourself on accident.    -Should you experience any significant changes in your wound(s), such as infection (redness, swelling, localized heat, increased pain, fever > 101 F, chills) or have any questions regarding your home care instructions, please contact the wound center at [phone]. If after hours, contact your primary care physician or go to the hospital emergency room.

## 2020-02-04 ENCOUNTER — OFFICE VISIT (OUTPATIENT)
Dept: WOUND CARE | Facility: MEDICAL CENTER | Age: 65
End: 2020-02-04
Attending: HOSPITALIST
Payer: MEDICAID

## 2020-02-04 VITALS
RESPIRATION RATE: 16 BRPM | OXYGEN SATURATION: 96 % | HEART RATE: 68 BPM | SYSTOLIC BLOOD PRESSURE: 139 MMHG | DIASTOLIC BLOOD PRESSURE: 83 MMHG | TEMPERATURE: 97.3 F

## 2020-02-04 DIAGNOSIS — M21.962 ACQUIRED DEFORMITY OF LEFT ANKLE AND FOOT: ICD-10-CM

## 2020-02-04 DIAGNOSIS — T14.8XXA WOUND INFECTION: ICD-10-CM

## 2020-02-04 DIAGNOSIS — L97.922 SKIN ULCER OF LEFT LOWER LEG WITH FAT LAYER EXPOSED (HCC): ICD-10-CM

## 2020-02-04 DIAGNOSIS — I87.8 CHRONIC VENOUS STASIS: ICD-10-CM

## 2020-02-04 DIAGNOSIS — L08.9 WOUND INFECTION: ICD-10-CM

## 2020-02-04 PROCEDURE — 11042 DBRDMT SUBQ TIS 1ST 20SQCM/<: CPT

## 2020-02-04 ASSESSMENT — PAIN SCALES - GENERAL: PAINLEVEL: NO PAIN

## 2020-02-04 NOTE — PATIENT INSTRUCTIONS
Avoid prolonged standing or sitting without elevating your legs.    - Multilayer compression wrap to LT leg. Do not get wet and keep on for the week. Only remove if temperature or sensation changes.   If compression needs to be removed, un-wrap it do not cut it off.     Should you experience any significant changes in your wound(s), such as infection (redness, swelling, localized heat, increased pain, fever > 101 F, chills) or have any questions regarding your home care instructions, please contact the wound center at [phone]. If after hours, contact your primary care physician or go to the hospital emergency room.   Keep dressing clean, dry and covered while bathing. Only change dressing if it becomes over saturated, soiled or falls off.

## 2020-02-11 ENCOUNTER — OFFICE VISIT (OUTPATIENT)
Dept: WOUND CARE | Facility: MEDICAL CENTER | Age: 65
End: 2020-02-11
Attending: HOSPITALIST
Payer: MEDICAID

## 2020-02-11 VITALS
TEMPERATURE: 98.3 F | DIASTOLIC BLOOD PRESSURE: 80 MMHG | HEART RATE: 100 BPM | OXYGEN SATURATION: 98 % | RESPIRATION RATE: 18 BRPM | SYSTOLIC BLOOD PRESSURE: 116 MMHG

## 2020-02-11 DIAGNOSIS — M21.962 ACQUIRED DEFORMITY OF LEFT ANKLE AND FOOT: ICD-10-CM

## 2020-02-11 DIAGNOSIS — M14.672 CHARCOT'S JOINT OF LEFT FOOT, NON-DIABETIC: ICD-10-CM

## 2020-02-11 DIAGNOSIS — L97.922 SKIN ULCER OF LEFT LOWER LEG WITH FAT LAYER EXPOSED (HCC): ICD-10-CM

## 2020-02-11 DIAGNOSIS — I87.8 CHRONIC VENOUS STASIS: ICD-10-CM

## 2020-02-11 PROCEDURE — 11042 DBRDMT SUBQ TIS 1ST 20SQCM/<: CPT

## 2020-02-11 PROCEDURE — 11042 DBRDMT SUBQ TIS 1ST 20SQCM/<: CPT | Performed by: NURSE PRACTITIONER

## 2020-02-11 ASSESSMENT — PAIN SCALES - GENERAL: PAINLEVEL: 5=MODERATE PAIN

## 2020-02-11 ASSESSMENT — ENCOUNTER SYMPTOMS
HEADACHES: 0
WEIGHT LOSS: 0
DEPRESSION: 0
MYALGIAS: 0
NAUSEA: 0
SHORTNESS OF BREATH: 0
ABDOMINAL PAIN: 0
FEVER: 0
HEADACHES: 0
DIARRHEA: 0
FEVER: 0
VOMITING: 0
CHILLS: 0
MYALGIAS: 0
DIARRHEA: 0
DIZZINESS: 0
DIZZINESS: 0
NAUSEA: 0
SHORTNESS OF BREATH: 0
DEPRESSION: 0
WEIGHT LOSS: 0
CHILLS: 0
ABDOMINAL PAIN: 0
COUGH: 0
VOMITING: 0
COUGH: 0

## 2020-02-11 ASSESSMENT — LIFESTYLE VARIABLES
SUBSTANCE_ABUSE: 0
SUBSTANCE_ABUSE: 0

## 2020-02-11 NOTE — PROGRESS NOTES
Provider Encounter- Full Thickness wound    HISTORY OF PRESENT ILLNESS  Wound History:    START OF CARE IN CLINIC: 1/7/20               REFERRING PROVIDER: CELE REYES                 WOUND ETIOLOGY: venous              LOCATION: Left lower leg              HISTORY: Patient was admitted to [facility] 12/26/19 for LLE blisters filled with yellow pus and pain. Past medical history of CHF, hypertension, anxiety, tobacco abuse; history of chronic poorly healing wounds due to peripheral vascular disease. Patient has been seen at [facility] clinic 2016, 2018 & last discharged from [facility] wound end of 2019.     Pertinent Medical History: has a past medical history of Anxiety, Charcot's joint of left foot, non-diabetic (3/21/2016), Chronic congestive heart failure (HCC) (11/16/2017), Hepatitis C, chronic (HCC), Hypertension, Migraine, Polysubstance abuse (HCC) (3/8/2018), Tobacco use (4/18/2016), Ulcer of left lower extremity with necrosis of muscle (HCC) (3/21/2016), and Venous stasis ulcer ([facility]) (2017).              ETIOLOGY HISTORY:  Vascular Surgeon: Dr. White. Compression Circ-aid. Varicose Veins none visible     TOBACCO USE: Patient denies; patient also denies alcohol and recreational drug use    Patient's problem list, allergies, and current medications reviewed and updated in Epic    Interval History:  1/14/2020. Initial clinic visit with KEV Coyne, JORDAN-BC. Patient reports feeling well.  He states wound has been present since hospitalization 12/26/19. He does note improvement. Denies wound drainage, odor. Denies erythema, swelling, pain. Pt is not a diabetic. Denies current alcohol, tobacco, or drug use. Pt stating that he has upcoming FU appt with [facility] (next week) to FU on a recent procedure he had to his LLE. He can not recall the procedure, but states it was done in office last week.  He also has an upcoming appt with [facility] as referred by PCP due to Charcot.     1/28/20:  Clinic visit with KEV Coyne FNP-BC. Patient reports feeling well.  He is happy with wound progress and feels the wound is improving.  Denies wound drainage, odor. Denies erythema, swelling, pain. Pt is not a diabetic. Denies current alcohol, tobacco, or drug use. Pt followed by [facility].  He also has been referred to [facility] by PCP due to Charcot, but has not yet followed up or scheduled with them.    2/4/20: Clinic visit with KEV Coyne FNP-BC. Patient reports feeling well, he feels wound is improving. Denies wound drainage, odor. Denies erythema, swelling, pain. He also has been referred to [facility] by PCP due to Charcot, but has not yet followed up or scheduled with them.      REVIEW OF SYSTEMS:   Review of Systems   Constitutional: Negative for chills, fever, malaise/fatigue and weight loss.   Respiratory: Negative for cough and shortness of breath.    Cardiovascular: Positive for leg swelling. Negative for chest pain.        Chronic   Gastrointestinal: Negative for abdominal pain, diarrhea, nausea and vomiting.   Musculoskeletal: Positive for joint pain. Negative for myalgias.        Chronic   Skin: Negative for itching and rash.   Neurological: Negative for dizziness and headaches.   Psychiatric/Behavioral: Negative for depression and substance abuse.       PHYSICAL EXAMINATION:   /83   Pulse 68   Temp 36.3 °C (97.3 °F)   Resp 16   SpO2 96%     Physical Exam   Constitutional: He is oriented to person, place, and time and well-developed, well-nourished, and in no distress. No distress.   HENT:   Head: Normocephalic and atraumatic.   Eyes: Pupils are equal, round, and reactive to light. Conjunctivae and EOM are normal.   Neck: Normal range of motion. Neck supple.   Cardiovascular: Normal rate and intact distal pulses.   Pulmonary/Chest: Effort normal. No respiratory distress.   Musculoskeletal: Normal range of motion.         General: Deformity and edema present.       Comments: Left charcot foot  1+pitting edema to bilateral LE   Neurological: He is alert and oriented to person, place, and time.   Skin: Skin is warm and dry.   See wound care assessment/photos   Psychiatric: Affect and judgment normal.       WOUND ASSESSMENT        Wound 01/07/20 Full Thickness Wound Tibia Left lower leg posterior lateral (Active)   Wound Image   2/4/2020  3:30 PM   Site Assessment Red;Yellow 2/4/2020  3:30 PM   Marylou-wound Assessment Intact;Edema;Yellow-brown 2/4/2020  3:30 PM   Margins Attached edges 2/4/2020  3:30 PM   Wound Length (cm) 4.5 cm 2/4/2020  3:30 PM   Wound Width (cm) 0.8 cm 2/4/2020  3:30 PM   Wound Depth (cm) 0.1 cm 2/4/2020  3:30 PM   Wound Surface Area (cm^2) 3.6 cm^2 2/4/2020  3:30 PM   Post Wound Length (cm) 4.9 cm 1/28/2020  3:00 PM    Post Wound Width (cm) 1.1 cm 1/28/2020  3:00 PM   Post Wound Depth (cm) 0.1 cm 1/28/2020  3:00 PM   Post Wound Surface Area (cm^2) 5.39 cm^2 1/28/2020  3:00 PM   Tunneling 0 cm 1/28/2020  3:00 PM   Undermining 0 cm 1/28/2020  3:00 PM   Closure Secondary intention 2/4/2020  3:30 PM   Drainage Amount Moderate 2/4/2020  3:30 PM   Drainage Description Serosanguineous 2/4/2020  3:30 PM   Non-staged Wound Description Full thickness 2/4/2020  3:30 PM   Treatments Cleansed;Pharmaceutical agent 2/4/2020  3:30 PM   Cleansing Approved Wound Cleanser 2/4/2020  3:30 PM   Dressing Cleansing/Solutions Normal Saline 2/4/2020  3:30 PM   Dressing Changed Changed 1/28/2020  3:00 PM   WOUND NURSE ONLY - Odor None 1/28/2020  3:00 PM   WOUND NURSE ONLY - Pulses 2+;Left;DP;PT 1/7/2020  1:00 PM   WOUND NURSE ONLY - Exposed Structures None 1/28/2020  3:00 PM   WOUND NURSE ONLY - Tissue Type and Percentage pre: 60% red, 40% yellow 1/28/2020  3:00 PM       Wound 02/04/20 Full Thickness Wound Tibia Left posterolateral superior (Active)   Wound Image   2/4/2020  3:30 PM   Site Assessment Red 2/4/2020  3:30 PM   Marylou-wound Assessment Dry;Yellow-brown 2/4/2020  3:30 PM    Margins Attached edges 2/4/2020  3:30 PM   Post Wound Length (cm) 2.5 cm 2/4/2020  3:30 PM    Post Wound Width (cm) 0.8 cm 2/4/2020  3:30 PM   Post Wound Depth (cm) 0.2 cm 2/4/2020  3:30 PM   Post Wound Surface Area (cm^2) 2 cm^2 2/4/2020  3:30 PM   Tunneling 0 cm 2/4/2020  3:30 PM   Undermining 0 cm 2/4/2020  3:30 PM   Closure Secondary intention 2/4/2020  3:30 PM   Drainage Amount Moderate 2/4/2020  3:30 PM   Drainage Description Serosanguineous 2/4/2020  3:30 PM   Non-staged Wound Description Full thickness 2/4/2020  3:30 PM   Treatments Cleansed;Pharmaceutical agent 2/4/2020  3:30 PM   Cleansing Approved Wound Cleanser 2/4/2020  3:30 PM   Periwound Protectant Moisture Barrier 2/4/2020  3:30 PM   Dressing Options Compression Wrap Two Layer;Hydrofiber Silver 2/4/2020  3:30 PM   Dressing Cleansing/Solutions Normal Saline 2/4/2020  3:30 PM   Dressing Changed Changed 2/4/2020  3:30 PM   Dressing Status Clean;Dry;Intact 2/4/2020  3:30 PM   Dressing Change Frequency Weekly 2/4/2020  3:30 PM   WOUND NURSE ONLY - Odor None 2/4/2020  3:30 PM   WOUND NURSE ONLY - Pulses Left;2+;DP;PT 2/4/2020  3:30 PM   WOUND NURSE ONLY - Exposed Structures None 2/4/2020  3:30 PM   WOUND NURSE ONLY - Tissue Type and Percentage Pre 50% yellow, 50% red; post 100% red 2/4/2020  3:30 PM       Wound 02/04/20 Full Thickness Wound Tibia Lt LE posterolateral inferior (Active)   Wound Image   2/4/2020  3:30 PM   Site Assessment Red 2/4/2020  3:30 PM   Marylou-wound Assessment Clean;Dry;Yellow-brown 2/4/2020  3:30 PM   Margins Attached edges 2/4/2020  3:30 PM   Post Wound Length (cm) 1.8 cm 2/4/2020  3:30 PM    Post Wound Width (cm) 0.6 cm 2/4/2020  3:30 PM   Post Wound Depth (cm) 0.2 cm 2/4/2020  3:30 PM   Post Wound Surface Area (cm^2) 1.08 cm^2 2/4/2020  3:30 PM   Tunneling 0 cm 2/4/2020  3:30 PM   Undermining 0 cm 2/4/2020  3:30 PM   Closure Secondary intention 2/4/2020  3:30 PM   Drainage Amount Moderate 2/4/2020  3:30 PM   Drainage Description  Serosanguineous 2/4/2020  3:30 PM   Non-staged Wound Description Full thickness 2/4/2020  3:30 PM   Treatments Cleansed;Pharmaceutical agent 2/4/2020  3:30 PM   Cleansing Approved Wound Cleanser 2/4/2020  3:30 PM   Periwound Protectant Moisture Barrier 2/4/2020  3:30 PM   Dressing Options Compression Wrap Two Layer;Hydrofiber Silver 2/4/2020  3:30 PM   Dressing Cleansing/Solutions Normal Saline 2/4/2020  3:30 PM   Dressing Changed Changed 2/4/2020  3:30 PM   Dressing Status Clean;Dry;Intact 2/4/2020  3:30 PM   Dressing Change Frequency Weekly 2/4/2020  3:30 PM   WOUND NURSE ONLY - Odor None 2/4/2020  3:30 PM   WOUND NURSE ONLY - Pulses Left;2+;DP;PT 2/4/2020  3:30 PM   WOUND NURSE ONLY - Exposed Structures None 2/4/2020  3:30 PM   WOUND NURSE ONLY - Tissue Type and Percentage pre 20% yellow, 80% red; post 100% red viable 2/4/2020  3:30 PM       PROCEDURE:   -2% viscous lidocaine applied topically to wound bed for approximately 5 minutes prior to debridement  -Curette used to debride wound bed.  Excisional debridement was performed to remove devitalized tissue until healthy, bleeding tissue was visualized.   Total wound surface area 8.47cm2 debrided.  Tissue debrided into the subcutaneous layer.    -Bleeding controlled with manual pressure.    -Wound care completed by wound RN, refer to flowsheet  -Patient tolerated the procedure well, without c/o pain or discomfort.       Pertinent Labs and Diagnostics:    Labs:     A1c:   Lab Results   Component Value Date/Time    HBA1C 5.7 (H) 01/23/2020 11:22 AM          VASCULAR STUDIES: BHUPINDER 12/27/2019   Right.    Doppler waveform of the common femoral artery is of high amplitude and    triphasic.    Doppler waveforms at the ankle are brisk and triphasic.    Ankle-brachial index is normal.       Left.    Could not perform ankle-brachial index due to large blisters/ulcers.   Normal toe-brachial index: 0.94   Doppler waveform of the common femoral artery is of high amplitude and     triphasic.    Biphasic waveforms seen at the ankle.     No recent venous studies in [facility]- will request records from [facility]    LAST  WOUND CULTURE:  DATE : 12/26/2019 +Serratia marcescens, pseudomonas aeruginosa, Group B Strep        ASSESSMENT AND PLAN:   1. Skin ulcer of left lower leg with fat layer exposed (HCC)  2/4/2020: Recurrent problem/wound  -Excisional debridement of posterior in clinic today, medically necessary to promote wound healing.  -2 layer compression to manage edema, and also to prevent patient from manipulating his wounds  -He is to return to clinic weekly for assessment, debridement, and compression     Wound care: Silver Hydrofiber to manage exudate and bioburden, foam cover dressing, Hypafix tape      2. Chronic venous stasis  Comments: Pt established at [facility] and had a recent procedure done. Pt unable to provde any further information.  Records have been requested but I do not see them scanned into [facility] yet.  RN to follow-up with this      3. Acquired deformity of left ankle and foot  Per patient his PCP has referred him to [facility] and [facility] for custom shoes and inserts. Encouraged patient to FU with both of these referrals.     4. Wound infection  Pt received IV Zosyn while in the hospital. Discharged home on oral Cipro and Doxy.  Which he subsequently completed as prescribed  2/4/20: No s/s of infection today. Will continue to monitor at all clinic visits.    PATIENT EDUCATION  - Importance of adequate nutrition for wound healing  -Advised to go to ER for any increased redness, swelling, drainage, or odor, or if patient develops fever, chills, nausea or vomiting.         Please note that this note may have been created using voice recognition software. I have worked with technical experts from [employer] to optimize the interface.  I have made every reasonable attempt to correct obvious errors, but there may be errors of grammar and possibly content that I did not  discover before finalizing the note.    N

## 2020-02-11 NOTE — PATIENT INSTRUCTIONS
Avoid prolonged standing or sitting without elevating your legs.    - Multilayer compression wrap to left leg. Do not get wet and keep on for the week. Only remove if temperature or sensation changes.    Keep dressing clean and dry and cover while bathing. Only change dressing if over saturated, soiled or its falling off.     Should you experience any significant changes in your wound(s) such as infection (redness, swelling, localized heat, increased pain, fever >101 F, chills) or have any questions regarding your home care instructions, please contact the wound center [phone]. If after hours, contact your primary care physician or go the hospital emergency room.

## 2020-02-12 NOTE — PROGRESS NOTES
Provider Encounter- Full Thickness wound    HISTORY OF PRESENT ILLNESS  Wound History:    START OF CARE IN CLINIC: 1/7/20               REFERRING PROVIDER: CELE REYES                 WOUND ETIOLOGY: venous              LOCATION: Left lower leg              HISTORY: Patient was admitted to [facility] 12/26/19 for LLE blisters filled with yellow pus and pain. Past medical history of CHF, hypertension, anxiety, tobacco abuse; history of chronic poorly healing wounds due to peripheral vascular disease. Patient has been seen at [facility] clinic 2016, 2018 & last discharged from [facility] wound end of 2019.     Pertinent Medical History: has a past medical history of Anxiety, Charcot's joint of left foot, non-diabetic (3/21/2016), Chronic congestive heart failure (HCC) (11/16/2017), Hepatitis C, chronic (HCC), Hypertension, Migraine, Polysubstance abuse (HCC) (3/8/2018), Tobacco use (4/18/2016), Ulcer of left lower extremity with necrosis of muscle (HCC) (3/21/2016), and Venous stasis ulcer ([facility]) (2017).              ETIOLOGY HISTORY:  Vascular Surgeon: Dr. White. Compression Circ-aid. Varicose Veins none visible     TOBACCO USE: Patient denies; patient also denies alcohol and recreational drug use    Patient's problem list, allergies, and current medications reviewed and updated in Epic    Interval History:  1/14/2020. Initial clinic visit with KEV Coyne, JORDAN-BC. Patient reports feeling well.  He states wound has been present since hospitalization 12/26/19. He does note improvement. Denies wound drainage, odor. Denies erythema, swelling, pain. Pt is not a diabetic. Denies current alcohol, tobacco, or drug use. Pt stating that he has upcoming FU appt with [facility] (next week) to FU on a recent procedure he had to his LLE. He can not recall the procedure, but states it was done in office last week.  He also has an upcoming appt with [facility] as referred by PCP due to Charcot.     1/28/20:  Clinic visit with KEV Coyne FNP-BC. Patient reports feeling well.  He is happy with wound progress and feels the wound is improving.  Denies wound drainage, odor. Denies erythema, swelling, pain. Pt is not a diabetic. Denies current alcohol, tobacco, or drug use. Pt followed by [facility].  He also has been referred to [facility] by PCP due to Charcot, but has not yet followed up or scheduled with them.    2/4/20: Clinic visit with KEV Coyne FNP-BC. Patient reports feeling well, he feels wound is improving. Denies wound drainage, odor. Denies erythema, swelling, pain. He also has been referred to [facility] by PCP due to Charcot, but has not yet followed up or scheduled with them.    2/11/20: Clinic visit with KEV Coyne FNP-BC. Patient reports feeling well, overall he is happy with the wound progress. Denies wound drainage, odor. Denies erythema, swelling, pain. He has not yet established with Ortho, but he reports that he did get his shoes and inserts.     REVIEW OF SYSTEMS:   Review of Systems   Constitutional: Negative for chills, fever, malaise/fatigue and weight loss.   Respiratory: Negative for cough and shortness of breath.    Cardiovascular: Positive for leg swelling. Negative for chest pain.        Chronic   Gastrointestinal: Negative for abdominal pain, diarrhea, nausea and vomiting.   Musculoskeletal: Positive for joint pain. Negative for myalgias.        Chronic   Skin: Negative for itching and rash.   Neurological: Negative for dizziness and headaches.   Psychiatric/Behavioral: Negative for depression and substance abuse.       PHYSICAL EXAMINATION:   /80   Pulse 100   Temp 36.8 °C (98.3 °F)   Resp 18   SpO2 98%     Physical Exam   Constitutional: He is oriented to person, place, and time and well-developed, well-nourished, and in no distress. No distress.   HENT:   Head: Normocephalic and atraumatic.   Eyes: Pupils are equal, round, and reactive to  light. Conjunctivae and EOM are normal.   Neck: Normal range of motion. Neck supple.   Cardiovascular: Normal rate and intact distal pulses.   Pulmonary/Chest: Effort normal. No respiratory distress.   Musculoskeletal: Normal range of motion.         General: Deformity and edema present.      Comments: Left charcot foot  1+pitting edema to bilateral LE   Neurological: He is alert and oriented to person, place, and time.   Skin: Skin is warm and dry.   See wound care assessment/photos   Psychiatric: Affect and judgment normal.       WOUND ASSESSMENT       Wound 02/04/20 Full Thickness Wound Tibia Left posterolateral superior (Active)   Wound Image    2/11/2020  3:30 PM   Site Assessment Red;Yellow 2/11/2020  3:30 PM   Marylou-wound Assessment Yellow-brown;Dry;Other (Comment) 2/11/2020  3:30 PM   Margins Attached edges 2/11/2020  3:30 PM   Wound Length (cm) 1.6 cm 2/11/2020  3:30 PM   Wound Width (cm) 0.5 cm 2/11/2020  3:30 PM   Wound Depth (cm) 0.1 cm 2/11/2020  3:30 PM   Wound Surface Area (cm^2) 0.8 cm^2 2/11/2020  3:30 PM   Post Wound Length (cm) 2 cm 2/11/2020  3:30 PM    Post Wound Width (cm) 0.6 cm 2/11/2020  3:30 PM   Post Wound Depth (cm) 0.2 cm 2/11/2020  3:30 PM   Post Wound Surface Area (cm^2) 1.2 cm^2 2/11/2020  3:30 PM   Tunneling 0 cm 2/4/2020  3:30 PM   Undermining 0 cm 2/4/2020  3:30 PM   Closure Secondary intention 2/4/2020  3:30 PM   Drainage Amount Small 2/11/2020  3:30 PM   Drainage Description Serosanguineous 2/11/2020  3:30 PM   Non-staged Wound Description Full thickness 2/11/2020  3:30 PM   Treatments Cleansed;Pharmaceutical agent;Other (Comment) 2/11/2020  3:30 PM   Cleansing Normal Saline Irrigation 2/11/2020  3:30 PM   Periwound Protectant Skin Moisturizer 2/11/2020  3:30 PM   Dressing Options Hydrofiber Silver;Compression Wrap Two Layer 2/11/2020  3:30 PM   Dressing Cleansing/Solutions Normal Saline 2/4/2020  3:30 PM   Dressing Changed Changed 2/4/2020  3:30 PM   Dressing Status  Clean;Dry;Intact 2/4/2020  3:30 PM   Dressing Change Frequency Weekly 2/4/2020  3:30 PM   WOUND NURSE ONLY - Odor None 2/11/2020  3:30 PM   WOUND NURSE ONLY - Pulses Left;2+;DP;PT 2/4/2020  3:30 PM   WOUND NURSE ONLY - Exposed Structures None 2/11/2020  3:30 PM   WOUND NURSE ONLY - Tissue Type and Percentage Pre: 90% red, 10% yellow 2/11/2020  3:30 PM       Wound 02/04/20 Full Thickness Wound Tibia Lt LE posterolateral inferior (Active)   Wound Image    2/11/2020  3:30 PM   Site Assessment Red;Yellow 2/11/2020  3:30 PM   Marylou-wound Assessment Dry;Yellow-brown;Other (Comment) 2/11/2020  3:30 PM   Margins Attached edges 2/11/2020  3:30 PM   Wound Length (cm) 0.9 cm 2/11/2020  3:30 PM   Wound Width (cm) 0.5 cm 2/11/2020  3:30 PM   Wound Depth (cm) 0.2 cm 2/11/2020  3:30 PM   Wound Surface Area (cm^2) 0.45 cm^2 2/11/2020  3:30 PM   Post Wound Length (cm) 0.9 cm 2/11/2020  3:30 PM    Post Wound Width (cm) 0.5 cm 2/11/2020  3:30 PM   Post Wound Depth (cm) 0.3 cm 2/11/2020  3:30 PM   Post Wound Surface Area (cm^2) 0.45 cm^2 2/11/2020  3:30 PM   Tunneling 0 cm 2/4/2020  3:30 PM   Undermining 0 cm 2/4/2020  3:30 PM   Closure Secondary intention 2/4/2020  3:30 PM   Drainage Amount Small 2/11/2020  3:30 PM   Drainage Description Serosanguineous 2/11/2020  3:30 PM   Non-staged Wound Description Full thickness 2/11/2020  3:30 PM   Treatments Cleansed;Pharmaceutical agent;Other (Comment) 2/11/2020  3:30 PM   Cleansing Normal Saline Irrigation 2/11/2020  3:30 PM   Periwound Protectant Skin Moisturizer 2/11/2020  3:30 PM   Dressing Options Hydrofiber Silver;Compression Wrap Two Layer 2/11/2020  3:30 PM   Dressing Cleansing/Solutions Normal Saline 2/4/2020  3:30 PM   Dressing Changed Changed 2/4/2020  3:30 PM   Dressing Status Clean;Dry;Intact 2/4/2020  3:30 PM   Dressing Change Frequency Weekly 2/4/2020  3:30 PM   WOUND NURSE ONLY - Odor None 2/11/2020  3:30 PM   WOUND NURSE ONLY - Pulses Left;2+;DP;PT 2/4/2020  3:30 PM   WOUND  NURSE ONLY - Exposed Structures None 2/11/2020  3:30 PM   WOUND NURSE ONLY - Tissue Type and Percentage Pre: 90% red, 10% yellow 2/11/2020  3:30 PM          PROCEDURE:   -2% viscous lidocaine applied topically to wound bed for approximately 5 minutes prior to debridement  -Curette used to debride wound bed.  Excisional debridement was performed to remove devitalized tissue until healthy, bleeding tissue was visualized.   Total wound surface area 1.65 cm2 debrided.  Tissue debrided into the subcutaneous layer.    -Bleeding controlled with manual pressure.    -Wound care completed by wound RN, refer to flowsheet  -Patient tolerated the procedure well, without c/o pain or discomfort.       Pertinent Labs and Diagnostics:    Labs:     A1c:   Lab Results   Component Value Date/Time    HBA1C 5.7 (H) 01/23/2020 11:22 AM          VASCULAR STUDIES: BHUPINDER 12/27/2019   Right.    Doppler waveform of the common femoral artery is of high amplitude and    triphasic.    Doppler waveforms at the ankle are brisk and triphasic.    Ankle-brachial index is normal.       Left.    Could not perform ankle-brachial index due to large blisters/ulcers.   Normal toe-brachial index: 0.94   Doppler waveform of the common femoral artery is of high amplitude and    triphasic.    Biphasic waveforms seen at the ankle.     No recent venous studies in [facility]- will request records from [facility]    LAST  WOUND CULTURE:  DATE : 12/26/2019 +Serratia marcescens, pseudomonas aeruginosa, Group B Strep        ASSESSMENT AND PLAN:   1. Skin ulcer of left lower leg with fat layer exposed (HCC)  2/11/2020: wound healing well, measuring smaller  -Excisional debridement of posterior in clinic today, medically necessary to promote wound healing.  -2 layer compression to manage edema, and also to prevent patient from manipulating his wounds  -He is to return to clinic weekly for assessment, debridement, and compression     Wound care: Silver Hydrofiber to manage  exudate and bioburden, foam cover dressing, Hypafix tape      2. Chronic venous stasis  Comments: Pt established at [facility] and had a recent procedure done. Pt unable to provde any further information.  Records have been requested but I do not see them scanned into epic yet.        3. Acquired deformity of left ankle and foot, Charcot's joint (non diabetic)  2/11/20:Patient established with [facility] and received custom shoes and inserts.   Per patient his PCP has referred him to Orthopedic-encouraged patient to FU with this referral       PATIENT EDUCATION  - Importance of adequate nutrition for wound healing  -Advised to go to ER for any increased redness, swelling, drainage, or odor, or if patient develops fever, chills, nausea or vomiting.         Please note that this note may have been created using voice recognition software. I have worked with technical experts from [employer] to optimize the interface.  I have made every reasonable attempt to correct obvious errors, but there may be errors of grammar and possibly content that I did not discover before finalizing the note.    N

## 2020-02-18 ENCOUNTER — OFFICE VISIT (OUTPATIENT)
Dept: WOUND CARE | Facility: MEDICAL CENTER | Age: 65
End: 2020-02-18
Attending: HOSPITALIST
Payer: MEDICAID

## 2020-02-18 VITALS
DIASTOLIC BLOOD PRESSURE: 92 MMHG | RESPIRATION RATE: 18 BRPM | SYSTOLIC BLOOD PRESSURE: 147 MMHG | TEMPERATURE: 98.3 F | OXYGEN SATURATION: 98 % | HEART RATE: 62 BPM

## 2020-02-18 DIAGNOSIS — L97.922 SKIN ULCER OF LEFT LOWER LEG WITH FAT LAYER EXPOSED (HCC): ICD-10-CM

## 2020-02-18 DIAGNOSIS — M21.962 ACQUIRED DEFORMITY OF LEFT ANKLE AND FOOT: ICD-10-CM

## 2020-02-18 DIAGNOSIS — I87.8 CHRONIC VENOUS STASIS: ICD-10-CM

## 2020-02-18 PROCEDURE — 11042 DBRDMT SUBQ TIS 1ST 20SQCM/<: CPT | Performed by: NURSE PRACTITIONER

## 2020-02-18 PROCEDURE — 11042 DBRDMT SUBQ TIS 1ST 20SQCM/<: CPT

## 2020-02-18 ASSESSMENT — ENCOUNTER SYMPTOMS
CHILLS: 0
HEADACHES: 0
VOMITING: 0
DIZZINESS: 0
ABDOMINAL PAIN: 0
COUGH: 0
NAUSEA: 0
DEPRESSION: 0
SHORTNESS OF BREATH: 0
DIARRHEA: 0
WEIGHT LOSS: 0
MYALGIAS: 0
FEVER: 0

## 2020-02-18 ASSESSMENT — PAIN SCALES - GENERAL: PAINLEVEL: NO PAIN

## 2020-02-18 ASSESSMENT — LIFESTYLE VARIABLES: SUBSTANCE_ABUSE: 0

## 2020-02-18 NOTE — PATIENT INSTRUCTIONS
Avoid prolonged standing or sitting without elevating your legs.    - Multilayer compression wrap to left leg. Do not get wet and keep on for the week. Only remove if temperature or sensation changes.    Keep dressing clean and dry and cover while bathing. Only change dressing if over saturated, soiled or its falling off.     Should you experience any significant changes in your wound(s) such as infection (redness, swelling, localized heat, increased pain, fever >101 F, chills) or have any questions regarding your home care instructions, please contact the wound center [phone]. If after hours, contact your primary care physician or go the hospital emergency room.

## 2020-02-19 RX ORDER — AMLODIPINE BESYLATE 10 MG/1
10 TABLET ORAL DAILY
Qty: 30 TAB | Refills: 5 | Status: SHIPPED | OUTPATIENT
Start: 2020-02-19 | End: 2021-03-03

## 2020-02-19 NOTE — PROGRESS NOTES
Provider Encounter- Full Thickness wound    HISTORY OF PRESENT ILLNESS  Wound History:    START OF CARE IN CLINIC: 1/7/20               REFERRING PROVIDER: CELE REYES                 WOUND ETIOLOGY: venous              LOCATION: Left lower leg              HISTORY: Patient was admitted to [facility] 12/26/19 for LLE blisters filled with yellow pus and pain. Past medical history of CHF, hypertension, anxiety, tobacco abuse; history of chronic poorly healing wounds due to peripheral vascular disease. Patient has been seen at [facility] clinic 2016, 2018 & last discharged from [facility] wound end of 2019.     Pertinent Medical History: has a past medical history of Anxiety, Charcot's joint of left foot, non-diabetic (3/21/2016), Chronic congestive heart failure (HCC) (11/16/2017), Hepatitis C, chronic (HCC), Hypertension, Migraine, Polysubstance abuse (HCC) (3/8/2018), Tobacco use (4/18/2016), Ulcer of left lower extremity with necrosis of muscle (HCC) (3/21/2016), and Venous stasis ulcer ([facility]) (2017).              ETIOLOGY HISTORY:  Vascular Surgeon: Dr. White. Compression Circ-aid. Varicose Veins none visible     TOBACCO USE: Patient denies; patient also denies alcohol and recreational drug use    Patient's problem list, allergies, and current medications reviewed and updated in Epic    Interval History:  1/14/2020. Initial clinic visit with KEV Coyne, JORDAN-BC. Patient reports feeling well.  He states wound has been present since hospitalization 12/26/19. He does note improvement. Denies wound drainage, odor. Denies erythema, swelling, pain. Pt is not a diabetic. Denies current alcohol, tobacco, or drug use. Pt stating that he has upcoming FU appt with [facility] (next week) to FU on a recent procedure he had to his LLE. He can not recall the procedure, but states it was done in office last week.  He also has an upcoming appt with [facility] as referred by PCP due to Charcot.     1/28/20:  Clinic visit with KEV Coyne FNP-BC. Patient reports feeling well.  He is happy with wound progress and feels the wound is improving.  Denies wound drainage, odor. Denies erythema, swelling, pain. Pt is not a diabetic. Denies current alcohol, tobacco, or drug use. Pt followed by [facility].  He also has been referred to [facility] by PCP due to Charcot, but has not yet followed up or scheduled with them.    2/4/20: Clinic visit with KEV Coyne FNP-BC. Patient reports feeling well, he feels wound is improving. Denies wound drainage, odor. Denies erythema, swelling, pain. He also has been referred to [facility] by PCP due to Charcot, but has not yet followed up or scheduled with them.    2/11/20: Clinic visit with KEV Coyne FNP-BC. Patient reports feeling well, overall he is happy with the wound progress. Denies wound drainage, odor. Denies erythema, swelling, pain. He has not yet established with Ortho, but he reports that he did get his shoes and inserts.     2/18/20: Clinic visit with KEV Coyne FNP-BC. Patient reports feeling well and is pleased with the wound progress. Denies wound drainage, odor. Denies erythema, swelling, pain. Ortho referral was sent to [place] due to insurance. Information provided to patient.     REVIEW OF SYSTEMS:   Review of Systems   Constitutional: Negative for chills, fever, malaise/fatigue and weight loss.   Respiratory: Negative for cough and shortness of breath.    Cardiovascular: Positive for leg swelling. Negative for chest pain.        Chronic   Gastrointestinal: Negative for abdominal pain, diarrhea, nausea and vomiting.   Musculoskeletal: Positive for joint pain. Negative for myalgias.        Chronic   Skin: Negative for itching and rash.   Neurological: Negative for dizziness and headaches.   Psychiatric/Behavioral: Negative for depression and substance abuse.       PHYSICAL EXAMINATION:   /92   Pulse 62   Temp 36.8 °C  (98.3 °F)   Resp 18   SpO2 98%     Physical Exam   Constitutional: He is oriented to person, place, and time and well-developed, well-nourished, and in no distress. No distress.   HENT:   Head: Normocephalic and atraumatic.   Eyes: Pupils are equal, round, and reactive to light. Conjunctivae and EOM are normal.   Neck: Normal range of motion. Neck supple.   Cardiovascular: Normal rate and intact distal pulses.   Pulmonary/Chest: Effort normal. No respiratory distress.   Musculoskeletal: Normal range of motion.         General: Deformity and edema present.      Comments: Left charcot foot  1+pitting edema to bilateral LE   Neurological: He is alert and oriented to person, place, and time.   Skin: Skin is warm and dry.   See wound care assessment/photos   Psychiatric: Affect and judgment normal.       WOUND ASSESSMENT      Wound 02/04/20 Full Thickness Wound Tibia Left posterolateral superior (Active)   Wound Image    2/18/2020  3:30 PM   Site Assessment Red;Yellow 2/18/2020  3:30 PM   Periwound Assessment Hemosiderin Staining;Dry 2/18/2020  3:30 PM   Margins Attached edges 2/18/2020  3:30 PM   Wound Length (cm) 0.7 cm 2/18/2020  3:30 PM   Wound Width (cm) 0.5 cm 2/18/2020  3:30 PM   Wound Depth (cm) 0.1 cm 2/18/2020  3:30 PM   Wound Surface Area (cm^2) 0.35 cm^2 2/18/2020  3:30 PM   Wound Volume (cm^3) 0.04 cm^3 2/18/2020  3:30 PM   Post-Procedure Length (cm) 0.7 cm 2/18/2020  3:30 PM   Post-Procedure Width (cm) 0.5 cm 2/18/2020  3:30 PM   Post-Procedure Depth (cm) 0.2 cm 2/18/2020  3:30 PM   Post-Procedure Surface Area (cm^2) 0.35 cm^2 2/18/2020  3:30 PM   Post-Procedure Volume (cm^3) 0.07 cm^3 2/18/2020  3:30 PM   Post Wound Length (cm) 2 cm 2/11/2020  3:30 PM    Post Wound Width (cm) 0.6 cm 2/11/2020  3:30 PM   Post Wound Depth (cm) 0.2 cm 2/11/2020  3:30 PM   Post Wound Surface Area (cm^2) 1.2 cm^2 2/11/2020  3:30 PM   Tunneling (cm) 0 cm 2/4/2020  3:30 PM   Undermining (cm) 0 cm 2/4/2020  3:30 PM   Closure  Secondary intention 2/4/2020  3:30 PM   Drainage Amount Small 2/18/2020  3:30 PM   Drainage Description Serosanguineous 2/18/2020  3:30 PM   Non-staged Wound Description Full thickness 2/18/2020  3:30 PM   Treatments Cleansed;Pharmaceutical agent;Provider debridement 2/18/2020  3:30 PM   Wound Cleansing Normal Saline Irrigation 2/18/2020  3:30 PM   Periwound Protectant Skin Moisturizer 2/18/2020  3:30 PM   Dressing Options Hydrofiber Silver;Compression Wrap Two Layer 2/18/2020  3:30 PM   Dressing Cleansing/Solutions Normal Saline 2/4/2020  3:30 PM   Dressing Changed Changed 2/4/2020  3:30 PM   Dressing Status Clean;Dry;Intact 2/4/2020  3:30 PM   Dressing Change/Treatment Frequency Weekly 2/4/2020  3:30 PM   Wound Odor None 2/18/2020  3:30 PM   Pulses Left;2+;DP;PT 2/4/2020  3:30 PM   Exposed Structures None 2/18/2020  3:30 PM   WOUND NURSE ONLY - Tissue Type and Percentage Pre: 95% red, 5% yellow 2/18/2020  3:30 PM       Wound 02/04/20 Full Thickness Wound Tibia Lt LE posterolateral inferior (Active)   Wound Image    2/18/2020  3:30 PM   Site Assessment Red;Yellow 2/18/2020  3:30 PM   Periwound Assessment Hemosiderin Staining;Dry 2/18/2020  3:30 PM   Margins Attached edges 2/18/2020  3:30 PM   Wound Length (cm) 0.6 cm 2/18/2020  3:30 PM   Wound Width (cm) 0.4 cm 2/18/2020  3:30 PM   Wound Depth (cm) 0.1 cm 2/18/2020  3:30 PM   Wound Surface Area (cm^2) 0.24 cm^2 2/18/2020  3:30 PM   Wound Volume (cm^3) 0.02 cm^3 2/18/2020  3:30 PM   Post-Procedure Length (cm) 0.6 cm 2/18/2020  3:30 PM   Post-Procedure Width (cm) 0.4 cm 2/18/2020  3:30 PM   Post-Procedure Depth (cm) 0.2 cm 2/18/2020  3:30 PM   Post-Procedure Surface Area (cm^2) 0.24 cm^2 2/18/2020  3:30 PM   Post-Procedure Volume (cm^3) 0.05 cm^3 2/18/2020  3:30 PM   Post Wound Length (cm) 0.9 cm 2/11/2020  3:30 PM    Post Wound Width (cm) 0.5 cm 2/11/2020  3:30 PM   Post Wound Depth (cm) 0.3 cm 2/11/2020  3:30 PM   Post Wound Surface Area (cm^2) 0.45 cm^2 2/11/2020   3:30 PM   Tunneling (cm) 0 cm 2/4/2020  3:30 PM   Undermining (cm) 0 cm 2/4/2020  3:30 PM   Closure Secondary intention 2/4/2020  3:30 PM   Drainage Amount Small 2/18/2020  3:30 PM   Drainage Description Serosanguineous 2/18/2020  3:30 PM   Non-staged Wound Description Full thickness 2/18/2020  3:30 PM   Treatments Cleansed;Pharmaceutical agent;Provider debridement 2/18/2020  3:30 PM   Wound Cleansing Normal Saline Irrigation 2/18/2020  3:30 PM   Periwound Protectant Skin Moisturizer 2/18/2020  3:30 PM   Dressing Options Hydrofiber Silver;Compression Wrap Two Layer 2/18/2020  3:30 PM   Dressing Cleansing/Solutions Normal Saline 2/4/2020  3:30 PM   Dressing Changed Changed 2/4/2020  3:30 PM   Dressing Status Clean;Dry;Intact 2/4/2020  3:30 PM   Dressing Change/Treatment Frequency Weekly 2/4/2020  3:30 PM   Wound Odor None 2/18/2020  3:30 PM   Pulses Left;2+;DP;PT 2/4/2020  3:30 PM   Exposed Structures None 2/18/2020  3:30 PM   WOUND NURSE ONLY - Tissue Type and Percentage Pre: 95% red, 5% yellow 2/18/2020  3:30 PM          PROCEDURE:   -2% viscous lidocaine applied topically to wound bed for approximately 5 minutes prior to debridement  -Curette used to debride wound bed.  Excisional debridement was performed to remove devitalized tissue until healthy, bleeding tissue was visualized.   Total wound surface area 0.59 cm2 debrided.  Tissue debrided into the subcutaneous layer.    -Bleeding controlled with manual pressure.    -Wound care completed by wound RN, refer to flowsheet  -Patient tolerated the procedure well, without c/o pain or discomfort.       Pertinent Labs and Diagnostics:    Labs:     A1c:   Lab Results   Component Value Date/Time    HBA1C 5.7 (H) 01/23/2020 11:22 AM          VASCULAR STUDIES: BHUPINDER 12/27/2019   Right.    Doppler waveform of the common femoral artery is of high amplitude and    triphasic.    Doppler waveforms at the ankle are brisk and triphasic.    Ankle-brachial index is normal.        Left.    Could not perform ankle-brachial index due to large blisters/ulcers.   Normal toe-brachial index: 0.94   Doppler waveform of the common femoral artery is of high amplitude and    triphasic.    Biphasic waveforms seen at the ankle.         ASSESSMENT AND PLAN:   1. Skin ulcer of left lower leg with fat layer exposed (HCC)  2/18/2020: wound healing well, measuring smaller  -Excisional debridement of posterior in clinic today, medically necessary to promote wound healing.  -2 layer compression to manage edema- pt tolerating well  -He is to return to clinic weekly for assessment, debridement, and compression     Wound care: Silver Hydrofiber to manage exudate and bioburden, foam cover dressing, Hypafix tape. 2 layer compression warp      2. Chronic venous stasis  Comments: Pt established at [facility] and had a recent procedure done.no records available.     3. Acquired deformity of left ankle and foot, Charcot's joint (non diabetic)  Patient established with [facility] and received custom shoes and inserts.   Per patient his PCP has referred him to Orthopedics    2/18/20: Ortho referral was sent to [place] due to insurance. Information provided to patient. Pt stating that he would like to hold off on this right now.     PATIENT EDUCATION  - Importance of adequate nutrition for wound healing  -Advised to go to ER for any increased redness, swelling, drainage, or odor, or if patient develops fever, chills, nausea or vomiting.         Please note that this note may have been created using voice recognition software. I have worked with technical experts from [employer] to optimize the interface.  I have made every reasonable attempt to correct obvious errors, but there may be errors of grammar and possibly content that I did not discover before finalizing the note.    N

## 2020-02-19 NOTE — TELEPHONE ENCOUNTER
Received request via: Pharmacy    Was the patient seen in the last year in this department? Yes    Does the patient have an active prescription (recently filled or refills available) for medication(s) requested? No   Future Appointments       Provider Department Center    2/25/2020 3:30 PM [facility] R.[facility]. [facility] [address] [place] [address]    2/26/2020 4:40 PM Marcela Ch M.D. [facility]    3/4/2020 4:00 PM [facility] [address] [place] [address]    3/10/2020 2:30 PM [facility] [address] [place] [address]    3/17/2020 3:30 PM [facility] [address] [place] [address]

## 2020-02-24 NOTE — TELEPHONE ENCOUNTER
Received request via: Pharmacy    Was the patient seen in the last year in this department? Yes  Future Appointments       Provider Department Center    2/25/2020 3:30 PM George Varg[facility]se R.[facility]. [facility] [address] [place] [address]    2/26/2020 4:40 PM Marcela Ch M.D. [facility]    3/4/2020 4:00 PM [facility] [address] [place] [address]    3/10/2020 2:30 PM [facility] [address] [place] [address]    3/17/2020 3:30 PM [facility].          Does the patient have an active prescription (recently filled or refills available) for medication(s) requested? No

## 2020-02-25 ENCOUNTER — NON-PROVIDER VISIT (OUTPATIENT)
Dept: WOUND CARE | Facility: MEDICAL CENTER | Age: 65
End: 2020-02-25
Attending: HOSPITALIST
Payer: MEDICAID

## 2020-02-25 PROCEDURE — 97597 DBRDMT OPN WND 1ST 20 CM/<: CPT

## 2020-02-25 RX ORDER — CITALOPRAM 20 MG/1
TABLET ORAL
Qty: 30 TAB | Refills: 5 | Status: SHIPPED
Start: 2020-02-25 | End: 2020-03-12

## 2020-02-26 NOTE — PATIENT INSTRUCTIONS
Avoid prolonged standing or sitting without elevating your legs.  - Apply tubigrip to your legs ending 2 fingers below back of knee without wrinkles.   - Knee-high gradient compression to legs  - Multilayer compression wrap to L leg. Do not get wet and keep on for the week. Only remove if temperature or sensation changes.   If compression needs to be removed, un-wrap it do not cut it off.     Should you experience any significant changes in your wound(s), such as infection (redness, swelling, localized heat, increased pain, fever > 101 F, chills) or have any questions regarding your home care instructions, please contact the wound center at [phone]. If after hours, contact your primary care physician or go to the hospital emergency room.   Keep dressing clean, dry and covered while bathing. Only change dressing if it becomes over saturated, soiled or falls off.

## 2020-02-26 NOTE — PROCEDURES
After 10 mins dwell time of topical viscous lidocaine 2%, wounds were selectively debrided with a curette  to remove sandrine wound callus and non viable tissue from wound bed. Area debrided <20cm2. Pt derek well.

## 2020-03-04 ENCOUNTER — OFFICE VISIT (OUTPATIENT)
Dept: WOUND CARE | Facility: MEDICAL CENTER | Age: 65
End: 2020-03-04
Attending: HOSPITALIST
Payer: MEDICAID

## 2020-03-04 VITALS
TEMPERATURE: 98.5 F | SYSTOLIC BLOOD PRESSURE: 149 MMHG | DIASTOLIC BLOOD PRESSURE: 99 MMHG | OXYGEN SATURATION: 96 % | HEART RATE: 93 BPM | RESPIRATION RATE: 18 BRPM

## 2020-03-04 DIAGNOSIS — L97.922 SKIN ULCER OF LEFT LOWER LEG WITH FAT LAYER EXPOSED (HCC): Primary | ICD-10-CM

## 2020-03-04 DIAGNOSIS — M21.962 ACQUIRED DEFORMITY OF LEFT ANKLE AND FOOT: ICD-10-CM

## 2020-03-04 DIAGNOSIS — I87.8 CHRONIC VENOUS STASIS: ICD-10-CM

## 2020-03-04 PROCEDURE — 11042 DBRDMT SUBQ TIS 1ST 20SQCM/<: CPT

## 2020-03-04 PROCEDURE — 11042 DBRDMT SUBQ TIS 1ST 20SQCM/<: CPT | Performed by: NURSE PRACTITIONER

## 2020-03-04 ASSESSMENT — ENCOUNTER SYMPTOMS
ABDOMINAL PAIN: 0
HEADACHES: 0
DEPRESSION: 0
WEIGHT LOSS: 0
DIZZINESS: 0
MYALGIAS: 0
FEVER: 0
NAUSEA: 0
DIARRHEA: 0
COUGH: 0
SHORTNESS OF BREATH: 0
CHILLS: 0
VOMITING: 0

## 2020-03-04 ASSESSMENT — LIFESTYLE VARIABLES: SUBSTANCE_ABUSE: 0

## 2020-03-04 NOTE — PROGRESS NOTES
Provider Encounter- Full Thickness wound    HISTORY OF PRESENT ILLNESS  Wound History:    START OF CARE IN CLINIC: 1/7/20               REFERRING PROVIDER: CELE REYES                 WOUND ETIOLOGY: venous              LOCATION: Left lower leg              HISTORY: Patient was admitted to [facility] 12/26/19 for LLE blisters filled with yellow pus and pain. Past medical history of CHF, hypertension, anxiety, tobacco abuse; history of chronic poorly healing wounds due to peripheral vascular disease. Patient has been seen at [facility] clinic 2016, 2018 & last discharged from [facility] wound end of 2019.     Pertinent Medical History: has a past medical history of Anxiety, Charcot's joint of left foot, non-diabetic (3/21/2016), Chronic congestive heart failure (HCC) (11/16/2017), Hepatitis C, chronic (HCC), Hypertension, Migraine, Polysubstance abuse (HCC) (3/8/2018), Tobacco use (4/18/2016), Ulcer of left lower extremity with necrosis of muscle (HCC) (3/21/2016), and Venous stasis ulcer ([facility]) (2017).              ETIOLOGY HISTORY:  Vascular Surgeon: Dr. White. Compression Circ-aid. Varicose Veins none visible     TOBACCO USE: Patient denies; patient also denies alcohol and recreational drug use    Patient's problem list, allergies, and current medications reviewed and updated in Epic    Interval History:  1/14/2020. Initial clinic visit with KEV Coyne, JORDAN-BC. Patient reports feeling well.  He states wound has been present since hospitalization 12/26/19. He does note improvement. Denies wound drainage, odor. Denies erythema, swelling, pain. Pt is not a diabetic. Denies current alcohol, tobacco, or drug use. Pt stating that he has upcoming FU appt with [facility] (next week) to FU on a recent procedure he had to his LLE. He can not recall the procedure, but states it was done in office last week.  He also has an upcoming appt with [facility] as referred by PCP due to Charcot.     1/28/20:  Clinic visit with KEV Coyne FNP-BC. Patient reports feeling well.  He is happy with wound progress and feels the wound is improving.  Denies wound drainage, odor. Denies erythema, swelling, pain. Pt is not a diabetic. Denies current alcohol, tobacco, or drug use. Pt followed by [facility].  He also has been referred to [facility] by PCP due to Charcot, but has not yet followed up or scheduled with them.    2/4/20: Clinic visit with KEV Coyne FNP-BC. Patient reports feeling well, he feels wound is improving. Denies wound drainage, odor. Denies erythema, swelling, pain. He also has been referred to [facility] by PCP due to Charcot, but has not yet followed up or scheduled with them.    2/11/20: Clinic visit with KEV Coyne FNP-BC. Patient reports feeling well, overall he is happy with the wound progress. Denies wound drainage, odor. Denies erythema, swelling, pain. He has not yet established with Ortho, but he reports that he did get his shoes and inserts.     2/18/20: Clinic visit with KEV Coyne FNP-BC. Patient reports feeling well and is pleased with the wound progress. Denies wound drainage, odor. Denies erythema, swelling, pain. Ortho referral was sent to [place] due to insurance. Information provided to patient.     3/4/2020: Clinic visit with KEV Luna. Patient states that they are feeling well today.  Patient denies fever, chills, nausea, vomiting, lightheadedness, dizziness, shortness of breath and chest pain.  Removed small amount of scab forming over wound bed.  Patient just has a small open wound at the areas have epithelialized.  Hopefully the wound very resolved in the next few weeks.    REVIEW OF SYSTEMS:   Review of Systems   Constitutional: Negative for chills, fever, malaise/fatigue and weight loss.   Respiratory: Negative for cough and shortness of breath.    Cardiovascular: Positive for leg swelling. Negative for chest pain.         Chronic   Gastrointestinal: Negative for abdominal pain, diarrhea, nausea and vomiting.   Musculoskeletal: Positive for joint pain. Negative for myalgias.        Chronic   Skin: Negative for itching and rash.   Neurological: Negative for dizziness and headaches.   Psychiatric/Behavioral: Negative for depression and substance abuse.       PHYSICAL EXAMINATION:   /99   Pulse 93   Temp 36.9 °C (98.5 °F) (Temporal)   Resp 18   SpO2 96%     Physical Exam   Constitutional: He is oriented to person, place, and time and well-developed, well-nourished, and in no distress. No distress.   HENT:   Head: Normocephalic and atraumatic.   Eyes: Pupils are equal, round, and reactive to light. Conjunctivae and EOM are normal.   Neck: Normal range of motion. Neck supple.   Cardiovascular: Normal rate and intact distal pulses.   Pulmonary/Chest: Effort normal. No respiratory distress.   Musculoskeletal: Normal range of motion.         General: Deformity and edema present.      Comments: Left charcot foot  1+pitting edema to bilateral LE   Neurological: He is alert and oriented to person, place, and time.   Skin: Skin is warm and dry.   See wound care assessment/photos   Psychiatric: Affect and judgment normal.       WOUND ASSESSMENT          Wound 02/04/20 Full Thickness Wound Tibia Left posterolateral superior (Active)   Wound Image    3/4/2020  4:20 PM   Site Assessment Dry;Brown 3/4/2020  4:20 PM   Periwound Assessment Hemosiderin Staining;Edema 3/4/2020  4:20 PM   Margins Attached edges 3/4/2020  4:20 PM   Wound Surface Area (cm^2) 0.35 cm^2 2/18/2020  3:30 PM   Wound Volume (cm^3) 0.04 cm^3 2/18/2020  3:30 PM   Post-Procedure Length (cm) 0.5 cm 3/4/2020  4:20 PM   Post-Procedure Width (cm) 0.3 cm 3/4/2020  4:20 PM   Post-Procedure Depth (cm) 0.1 cm 3/4/2020  4:20 PM   Post-Procedure Surface Area (cm^2) 0.15 cm^2 3/4/2020  4:20 PM   Post-Procedure Volume (cm^3) 0.02 cm^3 3/4/2020  4:20 PM   Post Wound Length (cm) 2 cm  2/11/2020  3:30 PM    Post Wound Width (cm) 0.6 cm 2/11/2020  3:30 PM   Post Wound Depth (cm) 0.2 cm 2/11/2020  3:30 PM   Post Wound Surface Area (cm^2) 1.2 cm^2 2/11/2020  3:30 PM   Tunneling (cm) 0 cm 3/4/2020  4:20 PM   Undermining (cm) 0 cm 3/4/2020  4:20 PM   Closure Secondary intention 2/25/2020  3:00 PM   Drainage Amount None 3/4/2020  4:20 PM   Drainage Description Serosanguineous 2/25/2020  3:00 PM   Non-staged Wound Description Full thickness 2/25/2020  3:00 PM   Treatments Cleansed;Topical Lidocaine;Provider debridement 3/4/2020  4:20 PM   Wound Cleansing Foam Cleanser/Washcloth 3/4/2020  4:20 PM   Periwound Protectant Skin Protectant Wipes to Periwound 3/4/2020  4:20 PM   Dressing Options Hydrofiber Silver;Silicone Adhesive Foam;Compression Wrap Two Layer 3/4/2020  4:20 PM   Dressing Cleansing/Solutions Normal Saline 2/4/2020  3:30 PM   Dressing Changed Changed 3/4/2020  4:20 PM   Dressing Status Clean;Dry;Intact 2/4/2020  3:30 PM   Dressing Change/Treatment Frequency Weekly 2/4/2020  3:30 PM   Wound Odor None 3/4/2020  4:20 PM   Pulses Left;2+;DP;PT 2/4/2020  3:30 PM   Exposed Structures None 3/4/2020  4:20 PM   WOUND NURSE ONLY - Tissue Type and Percentage Pre-debridement: 100% dry brown scab. 3/4/2020  4:20 PM                       PROCEDURE:   -2% viscous lidocaine applied topically to wound bed for approximately 5 minutes prior to debridement  -Curette used to debride wound bed.  Excisional debridement was performed to remove devitalized tissue until healthy, bleeding tissue was visualized.   Total wound surface area 0.15 cm2 debrided.  Tissue debrided into the subcutaneous layer.    -Bleeding controlled with manual pressure.    -Wound care completed by wound RN, refer to flowsheet  -Patient tolerated the procedure well, without c/o pain or discomfort.       Pertinent Labs and Diagnostics:    Labs:     A1c:   Lab Results   Component Value Date/Time    HBA1C 5.7 (H) 01/23/2020 11:22 AM           VASCULAR STUDIES: BHUPINDER 12/27/2019   Right.    Doppler waveform of the common femoral artery is of high amplitude and    triphasic.    Doppler waveforms at the ankle are brisk and triphasic.    Ankle-brachial index is normal.       Left.    Could not perform ankle-brachial index due to large blisters/ulcers.   Normal toe-brachial index: 0.94   Doppler waveform of the common femoral artery is of high amplitude and    triphasic.    Biphasic waveforms seen at the ankle.         ASSESSMENT AND PLAN:   1. Skin ulcer of left lower leg with fat layer exposed (HCC)  03/04/20: Patient's wound is significantly smaller.  Expect resolution in the next few weeks  -Excisional debridement of posterior in clinic today, medically necessary to promote wound healing.  -2 layer compression to manage edema- pt tolerating well  -He is to return to clinic weekly for assessment, debridement, and compression     Wound care: Silver Hydrofiber to manage exudate and bioburden, foam cover dressing, Hypafix tape. 2 layer compression warp      2. Chronic venous stasis  Comments: Pt established at [facility] and had a recent procedure done.no records available.     3. Acquired deformity of left ankle and foot, Charcot's joint (non diabetic)  Patient established with [facility] and received custom shoes and inserts.   -Patient educated to return to [facility] to readjust his custom shoes and inserts part of the insert is folded over into the shoe bed.  Per patient his PCP has referred him to Orthopedics    03/04/20: Ortho referral was sent to [place] due to insurance. Information provided to patient.   -    PATIENT EDUCATION  - Importance of adequate nutrition for wound healing  -Advised to go to ER for any increased redness, swelling, drainage, or odor, or if patient develops fever, chills, nausea or vomiting.         Please note that this note may have been created using voice recognition software. I have worked with technical experts from  [facility] to optimize the interface.  I have made every reasonable attempt to correct obvious errors, but there may be errors of grammar and possibly content that I did not discover before finalizing the note.    N

## 2020-03-05 NOTE — PATIENT INSTRUCTIONS
-Keep dressings clean, dry and covered while bathing. Only change dressings if they become over saturated, soiled or fall off.     -Avoid prolonged standing or sitting without elevating your legs.    -Remove your compression garments if you have severe pain, severe swelling, numbness, color change, or temperature change in your toes. If you need to remove your compression garments, do so by unrolling them. Do not cut the compression garments off, this is to prevent cutting yourself on accident.    -Should you experience any significant changes in your wound(s), such as infection (redness, swelling, localized heat, increased pain, fever > 101 F, chills) or have any questions regarding your home care instructions, please contact the wound center at [phone]. If after hours, contact your primary care physician or go to the hospital emergency room.

## 2020-03-10 ENCOUNTER — OFFICE VISIT (OUTPATIENT)
Dept: WOUND CARE | Facility: MEDICAL CENTER | Age: 65
End: 2020-03-10
Attending: HOSPITALIST
Payer: MEDICAID

## 2020-03-10 PROCEDURE — 97602 WOUND(S) CARE NON-SELECTIVE: CPT

## 2020-03-10 NOTE — PATIENT INSTRUCTIONS
Avoid prolonged standing or sitting without elevating your legs.  - Apply tubigrip to your legs ending 2 fingers below back of knee without wrinkles.   - Knee-high gradient compression to legs  - Multilayer compression wrap to L leg. Do not get wet and keep on for the week. Only remove if temperature or sensation changes.   If compression needs to be removed, un-wrap it do not cut it off.     Should you experience any significant changes in your wound(s), such as infection (redness, swelling, localized heat, increased pain, fever > 101 F, chills) or have any questions regarding your home care instructions, please contact the wound center at [phone]. If after hours, contact your primary care physician or go to the hospital emergency room.   Keep dressing clean, dry and covered while bathing. Only change dressing if it becomes over saturated, soiled or falls off.

## 2020-03-10 NOTE — PROGRESS NOTES
Pt was a no show for appt today. I called pt, no answer so I left him a VM to call [phone] to re-schedule.     Addendum - pt arrived for appointment at 1540hrs. I had a cx, therefore I was able to see him. See today's note.   Pt. Called to see if her prescription could get sent to the [facility] instead of the [facility]. The prescription was sent. Pt. Aware.

## 2020-03-12 ENCOUNTER — OFFICE VISIT (OUTPATIENT)
Dept: MEDICAL GROUP | Facility: MEDICAL CENTER | Age: 65
End: 2020-03-12
Attending: INTERNAL MEDICINE
Payer: MEDICAID

## 2020-03-12 VITALS
WEIGHT: 185 LBS | BODY MASS INDEX: 25.06 KG/M2 | HEART RATE: 98 BPM | DIASTOLIC BLOOD PRESSURE: 100 MMHG | TEMPERATURE: 98.6 F | RESPIRATION RATE: 16 BRPM | HEIGHT: 72 IN | OXYGEN SATURATION: 94 % | SYSTOLIC BLOOD PRESSURE: 160 MMHG

## 2020-03-12 DIAGNOSIS — I10 ESSENTIAL HYPERTENSION: ICD-10-CM

## 2020-03-12 DIAGNOSIS — M14.672 CHARCOT'S JOINT OF LEFT FOOT, NON-DIABETIC: ICD-10-CM

## 2020-03-12 DIAGNOSIS — G47.00 INSOMNIA, UNSPECIFIED TYPE: ICD-10-CM

## 2020-03-12 DIAGNOSIS — F41.9 ANXIETY: ICD-10-CM

## 2020-03-12 DIAGNOSIS — M25.572 PAIN OF JOINT OF LEFT ANKLE AND FOOT: ICD-10-CM

## 2020-03-12 PROBLEM — L97.921 SKIN ULCER OF LEFT LOWER LEG, LIMITED TO BREAKDOWN OF SKIN (HCC): Status: RESOLVED | Noted: 2019-03-11 | Resolved: 2020-03-12

## 2020-03-12 PROBLEM — L08.9 INFECTED ULCER OF SKIN (HCC): Status: RESOLVED | Noted: 2018-12-19 | Resolved: 2020-03-12

## 2020-03-12 PROBLEM — L97.922 SKIN ULCER OF LEFT LOWER LEG WITH FAT LAYER EXPOSED (HCC): Status: RESOLVED | Noted: 2018-11-29 | Resolved: 2020-03-12

## 2020-03-12 PROBLEM — Z09 HOSPITAL DISCHARGE FOLLOW-UP: Status: RESOLVED | Noted: 2020-01-08 | Resolved: 2020-03-12

## 2020-03-12 PROBLEM — I87.8 CHRONIC VENOUS STASIS: Status: RESOLVED | Noted: 2018-11-29 | Resolved: 2020-03-12

## 2020-03-12 PROBLEM — M79.672 LEFT FOOT PAIN: Status: RESOLVED | Noted: 2018-03-07 | Resolved: 2020-03-12

## 2020-03-12 PROBLEM — F19.10 POLYSUBSTANCE ABUSE (HCC): Status: RESOLVED | Noted: 2018-03-08 | Resolved: 2020-03-12

## 2020-03-12 PROBLEM — L98.499 INFECTED ULCER OF SKIN (HCC): Status: RESOLVED | Noted: 2018-12-19 | Resolved: 2020-03-12

## 2020-03-12 PROBLEM — M21.962 ACQUIRED DEFORMITY OF LEFT ANKLE AND FOOT: Status: RESOLVED | Noted: 2019-06-19 | Resolved: 2020-03-12

## 2020-03-12 PROCEDURE — 99214 OFFICE O/P EST MOD 30 MIN: CPT | Performed by: INTERNAL MEDICINE

## 2020-03-12 PROCEDURE — 99213 OFFICE O/P EST LOW 20 MIN: CPT | Performed by: INTERNAL MEDICINE

## 2020-03-12 RX ORDER — SERTRALINE HYDROCHLORIDE 100 MG/1
100 TABLET, FILM COATED ORAL DAILY
Qty: 30 TAB | Refills: 3 | Status: SHIPPED | OUTPATIENT
Start: 2020-03-12 | End: 2020-09-09

## 2020-03-12 RX ORDER — ASCORBIC ACID 500 MG
500 TABLET ORAL DAILY
Qty: 30 TAB | Refills: 5 | Status: SHIPPED | OUTPATIENT
Start: 2020-03-12 | End: 2020-12-17

## 2020-03-12 RX ORDER — AMITRIPTYLINE HYDROCHLORIDE 25 MG/1
25 TABLET, FILM COATED ORAL NIGHTLY PRN
Qty: 30 TAB | Refills: 3 | Status: ON HOLD
Start: 2020-03-12 | End: 2020-04-25

## 2020-03-12 RX ORDER — FERROUS GLUCONATE 324(38)MG
324 TABLET ORAL
Qty: 30 TAB | Refills: 5 | Status: SHIPPED | OUTPATIENT
Start: 2020-03-12 | End: 2022-09-23 | Stop reason: SDUPTHER

## 2020-03-12 ASSESSMENT — PAIN SCALES - GENERAL: PAINLEVEL: 4=SLIGHT-MODERATE PAIN

## 2020-03-12 ASSESSMENT — FIBROSIS 4 INDEX: FIB4 SCORE: 1.17

## 2020-03-12 NOTE — ASSESSMENT & PLAN NOTE
He reports his anxiety has gotten worse lately.  He relates this to his increasing foot pain as well as the possibility that he might need a foot surgery.  He has been taking Celexa 20 mg daily for several years and feels like it has become less effective over time.  He was previously on propranolol which we are using for both anxiety and blood pressure control but he stopped this during his last hospitalization and has not restarted it.  He is not sure if this was making a difference in his anxiety or not.

## 2020-03-13 NOTE — ASSESSMENT & PLAN NOTE
He continues to follow with wound care but there has been some progression of his Charcot foot.  Wound care has specifically requested that he see orthopedics for consideration of surgical intervention.  That referral has been placed and routed to [facility].  Patient has not been able to follow-up for an appointment.  Continues to wear special orthopedic shoes.

## 2020-03-13 NOTE — ASSESSMENT & PLAN NOTE
States he has been taking his blood pressure medication regularly.  He is currently using amlodipine 10 mg daily.  His blood pressure in clinic today is 160/100.  He reports being very anxious today as well as some pain.  He is a very difficult historian and unclear if he is actually being compliant with his med regimen.  In the past he has been orthostatic and hypotensive related to overmedication for his hypertension.

## 2020-03-13 NOTE — ASSESSMENT & PLAN NOTE
Continues to report difficulty with sleep.  He has increased his amitriptyline to 30 mg at night and he reports this is effective.  Previously had been prescribed 10 mg which was not working.

## 2020-03-13 NOTE — ASSESSMENT & PLAN NOTE
Continues to report chronic pain in his left foot and ankle.  He has the sensation that a piece of glass is stuck in his heel and he is fixated on this however I am doubtful that this is actually what is causing his pain.  In the past, he has been on gabapentin but he is concerned that this causes memory loss and he is not willing to take it.  He has also been on opiate medications several years ago but he is a very high risk candidate as he has had bouts of methamphetamine use and alcoholism.  He currently denies substance abuse, however has also denied it in the past and had positive UDS when we were attempting to treat his hep C.  He is requesting to see pain management today.

## 2020-03-13 NOTE — PROGRESS NOTES
Subjective:   Goran Chavez is a 64 y.o. male here today for worsening anxiety and insomnia, pain    Anxiety  He reports his anxiety has gotten worse lately.  He relates this to his increasing foot pain as well as the possibility that he might need a foot surgery.  He has been taking Celexa 20 mg daily for several years and feels like it has become less effective over time.  He was previously on propranolol which we are using for both anxiety and blood pressure control but he stopped this during his last hospitalization and has not restarted it.  He is not sure if this was making a difference in his anxiety or not.      Charcot's joint of left foot, non-diabetic  He continues to follow with wound care but there has been some progression of his Charcot foot.  Wound care has specifically requested that he see orthopedics for consideration of surgical intervention.  That referral has been placed and routed to [place].  Patient has not been able to follow-up for an appointment.  Continues to wear special orthopedic shoes.    Essential hypertension  States he has been taking his blood pressure medication regularly.  He is currently using amlodipine 10 mg daily.  His blood pressure in clinic today is 160/100.  He reports being very anxious today as well as some pain.  He is a very difficult historian and unclear if he is actually being compliant with his med regimen.  In the past he has been orthostatic and hypotensive related to overmedication for his hypertension.    Insomnia  Continues to report difficulty with sleep.  He has increased his amitriptyline to 30 mg at night and he reports this is effective.  Previously had been prescribed 10 mg which was not working.    Pain of joint of left ankle and foot  Continues to report chronic pain in his left foot and ankle.  He has the sensation that a piece of glass is stuck in his heel and he is fixated on this however I am doubtful that this is actually what is  causing his pain.  In the past, he has been on gabapentin but he is concerned that this causes memory loss and he is not willing to take it.  He has also been on opiate medications several years ago but he is a very high risk candidate as he has had bouts of methamphetamine use and alcoholism.  He currently denies substance abuse, however has also denied it in the past and had positive UDS when we were attempting to treat his hep C.  He is requesting to see pain management today.         Current medicines (including changes today)  Current Outpatient Medications   Medication Sig Dispense Refill   • amitriptyline (ELAVIL) 25 MG Tab Take 1 Tab by mouth at bedtime as needed for Sleep. 30 Tab 3   • sertraline (ZOLOFT) 100 MG Tab Take 1 Tab by mouth every day. 30 Tab 3   • ferrous gluconate (FERGON) 324 (38 Fe) MG Tab Take 1 Tab by mouth every morning with breakfast. 30 Tab 5   • ascorbic acid (VITAMIN C) 500 MG tablet Take 1 Tab by mouth every day. 30 Tab 5   • amLODIPine (NORVASC) 10 MG Tab Take 1 Tab by mouth every day. 30 Tab 5   • atorvastatin (LIPITOR) 40 MG Tab Take 1 Tab by mouth every day. 30 Tab 11     No current facility-administered medications for this visit.      He  has a past medical history of Anxiety, Charcot's joint of left foot, non-diabetic (3/21/2016), Chronic congestive heart failure (HCC) (11/16/2017), Hepatitis C, chronic ([facility]), Hypertension, Migraine, Polysubstance abuse (HCC) (3/8/2018), Tobacco use (4/18/2016), Ulcer of left lower extremity with necrosis of muscle ([facility]) (3/21/2016), and Venous stasis ulcer ([facility]) (2017).    ROS   Denies chest pain, shortness of breath  As above in HPI     Objective:     Vitals:    03/12/20 1809   BP: 160/100   Pulse: 98   Resp: 16   Temp: 37 °C (98.6 °F)   SpO2: 94%     Body mass index is 25.09 kg/m².   Physical Exam:  Constitutional: Alert, no distress.  Skin: Warm, dry, good turgor, no rashes in visible areas.  Eye: Equal, round and reactive, conjunctiva  clear, lids normal.  Psych: Alert and oriented x3, speech is somewhat difficult to understand but at patient's baseline  Extrem: Legs in [place] boots      Assessment and Plan:   The following treatment plan was discussed    1. Anxiety  He no longer feels that the Celexa is effective.  We will switch to Zoloft as below  - sertraline (ZOLOFT) 100 MG Tab; Take 1 Tab by mouth every day.  Dispense: 30 Tab; Refill: 3  -Follow-up in 6 weeks, if at that time still symptomatic could either consider restarting propranolol or adding BuSpar versus uptitrating Zoloft    2. Charcot's joint of left foot, non-diabetic  Referral to pain management placed at patient's request.  I also encouraged him to follow-up with orthopedic surgeon in [facility] as this may be definitive treatment.  - REFERRAL TO PAIN CLINIC    3. Insomnia, unspecified type  He reports good control with 3 of the 10 mg amitriptyline.  For medication simplification I have prescribed 25 mg tablets.  We will follow-up in 6 weeks.  - amitriptyline (ELAVIL) 25 MG Tab; Take 1 Tab by mouth at bedtime as needed for Sleep.  Dispense: 30 Tab; Refill: 3    4. Pain of joint of left ankle and foot  As above  - REFERRAL TO PAIN CLINIC    5. Essential hypertension  Blood pressure is very elevated in clinic today however question patient compliance with medication.  I have asked his daughter to set up a pillbox for him and start monitoring blood pressure at home.  -Amlodipine 10 mg daily  -Improved patient compliance with pillbox  -Encouraged home blood pressure monitoring        Followup: Return in about 6 weeks (around 4/23/2020), or if symptoms worsen or fail to improve, for anxiety, hypertension, insomnia.

## 2020-03-17 ENCOUNTER — OFFICE VISIT (OUTPATIENT)
Dept: WOUND CARE | Facility: MEDICAL CENTER | Age: 65
End: 2020-03-17
Attending: HOSPITALIST
Payer: MEDICAID

## 2020-03-17 VITALS
RESPIRATION RATE: 18 BRPM | OXYGEN SATURATION: 98 % | TEMPERATURE: 98.1 F | SYSTOLIC BLOOD PRESSURE: 166 MMHG | DIASTOLIC BLOOD PRESSURE: 107 MMHG | HEART RATE: 70 BPM

## 2020-03-17 DIAGNOSIS — M21.962 ACQUIRED DEFORMITY OF LEFT ANKLE AND FOOT: ICD-10-CM

## 2020-03-17 DIAGNOSIS — L97.922 SKIN ULCER OF LEFT LOWER LEG WITH FAT LAYER EXPOSED (HCC): ICD-10-CM

## 2020-03-17 DIAGNOSIS — I87.8 CHRONIC VENOUS STASIS: ICD-10-CM

## 2020-03-17 PROCEDURE — 11042 DBRDMT SUBQ TIS 1ST 20SQCM/<: CPT | Performed by: NURSE PRACTITIONER

## 2020-03-17 PROCEDURE — 99211 OFF/OP EST MAY X REQ PHY/QHP: CPT

## 2020-03-17 PROCEDURE — 11042 DBRDMT SUBQ TIS 1ST 20SQCM/<: CPT

## 2020-03-17 PROCEDURE — 99213 OFFICE O/P EST LOW 20 MIN: CPT | Mod: 25 | Performed by: NURSE PRACTITIONER

## 2020-03-17 ASSESSMENT — LIFESTYLE VARIABLES: SUBSTANCE_ABUSE: 0

## 2020-03-17 ASSESSMENT — ENCOUNTER SYMPTOMS
DIZZINESS: 0
ABDOMINAL PAIN: 0
VOMITING: 0
FEVER: 0
CHILLS: 0
COUGH: 0
DEPRESSION: 0
NAUSEA: 0
SHORTNESS OF BREATH: 0
WEIGHT LOSS: 0
DIARRHEA: 0
MYALGIAS: 0
HEADACHES: 0

## 2020-03-17 NOTE — PROGRESS NOTES
Provider Encounter- Full Thickness wound    HISTORY OF PRESENT ILLNESS  Wound History:    START OF CARE IN CLINIC: 1/7/20               REFERRING PROVIDER: CELE REYES                 WOUND ETIOLOGY: venous              LOCATION: Left lower leg              HISTORY: Patient was admitted to [facility] 12/26/19 for LLE blisters filled with yellow pus and pain. Past medical history of CHF, hypertension, anxiety, tobacco abuse; history of chronic poorly healing wounds due to peripheral vascular disease. Patient has been seen at [facility] clinic 2016, 2018 & last discharged from [facility] wound end of 2019.     Pertinent Medical History: has a past medical history of Anxiety, Charcot's joint of left foot, non-diabetic (3/21/2016), Chronic congestive heart failure (HCC) (11/16/2017), Hepatitis C, chronic (HCC), Hypertension, Migraine, Polysubstance abuse (HCC) (3/8/2018), Tobacco use (4/18/2016), Ulcer of left lower extremity with necrosis of muscle (HCC) (3/21/2016), and Venous stasis ulcer (formerly [facility]) (2017).              ETIOLOGY HISTORY:  Vascular Surgeon: Dr. White. Compression Circ-aid. Varicose Veins none visible     TOBACCO USE: Patient denies; patient also denies alcohol and recreational drug use    Patient's problem list, allergies, and current medications reviewed and updated in Epic    Interval History:  1/14/2020. Initial clinic visit with KEV Coyne, JORDAN-BC. Patient reports feeling well.  He states wound has been present since hospitalization 12/26/19. He does note improvement. Denies wound drainage, odor. Denies erythema, swelling, pain. Pt is not a diabetic. Denies current alcohol, tobacco, or drug use. Pt stating that he has upcoming FU appt with [facility] (next week) to FU on a recent procedure he had to his LLE. He can not recall the procedure, but states it was done in office last week.  He also has an upcoming appt with [facility] as referred by PCP due to Charcot.     1/28/20:  Clinic visit with KEV Coyne FNP-BC. Patient reports feeling well.  He is happy with wound progress and feels the wound is improving.  Denies wound drainage, odor. Denies erythema, swelling, pain. Pt is not a diabetic. Denies current alcohol, tobacco, or drug use. Pt followed by [facility].  He also has been referred to [facility] by PCP due to Charcot, but has not yet followed up or scheduled with them.    2/4/20: Clinic visit with KEV Coyne FNP-BC. Patient reports feeling well, he feels wound is improving. Denies wound drainage, odor. Denies erythema, swelling, pain. He also has been referred to [facility] by PCP due to Charcot, but has not yet followed up or scheduled with them.    2/11/20: Clinic visit with KEV Coyne FNP-BC. Patient reports feeling well, overall he is happy with the wound progress. Denies wound drainage, odor. Denies erythema, swelling, pain. He has not yet established with Ortho, but he reports that he did get his shoes and inserts.     2/18/20: Clinic visit with KEV Coyne FNP-BC. Patient reports feeling well and is pleased with the wound progress. Denies wound drainage, odor. Denies erythema, swelling, pain. Ortho referral was sent to [place] due to insurance. Information provided to patient.     3/4/2020: Clinic visit with KEV Luna. Patient states that they are feeling well today.  Patient denies fever, chills, nausea, vomiting, lightheadedness, dizziness, shortness of breath and chest pain.  Removed small amount of scab forming over wound bed.  Patient just has a small open wound at the areas have epithelialized.  Hopefully the wound very resolved in the next few weeks.    3/17/2020 : Clinic visit with KEV Brantley.  Kodi states he is feeling well, denies fevers, chills, nausea, vomiting.  He continues to tolerate 2 layer compression wrap.    REVIEW OF SYSTEMS:   Review of Systems   Constitutional: Negative for chills,  fever, malaise/fatigue and weight loss.   Respiratory: Negative for cough and shortness of breath.    Cardiovascular: Positive for leg swelling. Negative for chest pain.        Chronic   Gastrointestinal: Negative for abdominal pain, diarrhea, nausea and vomiting.   Musculoskeletal: Positive for joint pain. Negative for myalgias.        Chronic   Skin: Negative for itching and rash.   Neurological: Negative for dizziness and headaches.   Psychiatric/Behavioral: Negative for depression and substance abuse.       PHYSICAL EXAMINATION:   BP (!) 166/107   Pulse 70   Temp 36.7 °C (98.1 °F) (Temporal)   Resp 18   SpO2 98%     Physical Exam   Constitutional: He is oriented to person, place, and time and well-developed, well-nourished, and in no distress. No distress.   HENT:   Head: Normocephalic and atraumatic.   Eyes: Pupils are equal, round, and reactive to light. Conjunctivae and EOM are normal.   Neck: Normal range of motion. Neck supple.   Cardiovascular: Normal rate and intact distal pulses.   Pulmonary/Chest: Effort normal. No respiratory distress.   Musculoskeletal: Normal range of motion.         General: Deformity and edema present.      Comments: Left charcot foot  1+pitting edema to bilateral LE   Neurological: He is alert and oriented to person, place, and time.   Skin: Skin is warm and dry.   See wound care assessment/photos   Psychiatric: Affect and judgment normal.       WOUND ASSESSMENT   Wound 02/04/20 Full Thickness Wound Tibia Left posterolateral  (Active)   Wound Image    3/17/2020  3:45 PM   Site Assessment Red 3/17/2020  3:45 PM   Periwound Assessment Hemosiderin Staining;Edema 3/17/2020  3:45 PM   Margins Attached edges 3/17/2020  3:45 PM   Wound Length (cm) 1.1 cm 3/17/2020  3:45 PM   Wound Width (cm) 0.3 cm 3/17/2020  3:45 PM   Wound Depth (cm) 0.2 cm 3/17/2020  3:45 PM   Wound Surface Area (cm^2) 0.33 cm^2 3/17/2020  3:45 PM   Wound Volume (cm^3) 0.07 cm^3 3/17/2020  3:45 PM   Post-Procedure  Length (cm) 1.4 cm 3/17/2020  3:45 PM   Post-Procedure Width (cm) 0.5 cm 3/17/2020  3:45 PM   Post-Procedure Depth (cm) 0.2 cm 3/17/2020  3:45 PM   Post-Procedure Surface Area (cm^2) 0.7 cm^2 3/17/2020  3:45 PM   Post-Procedure Volume (cm^3) 0.14 cm^3 3/17/2020  3:45 PM   Post Wound Length (cm) 2 cm 2/11/2020  3:30 PM    Post Wound Width (cm) 0.6 cm 2/11/2020  3:30 PM   Post Wound Depth (cm) 0.2 cm 2/11/2020  3:30 PM   Post Wound Surface Area (cm^2) 1.2 cm^2 2/11/2020  3:30 PM   Tunneling (cm) 0 cm 3/17/2020  3:45 PM   Undermining (cm) 0 cm 3/17/2020  3:45 PM   Closure Secondary intention 3/10/2020  4:00 PM   Drainage Amount Small 3/17/2020  3:45 PM   Drainage Description Serous 3/17/2020  3:45 PM   Non-staged Wound Description Full thickness 3/17/2020  3:45 PM   Treatments Cleansed;Provider debridement 3/17/2020  3:45 PM   Wound Cleansing Foam Cleanser/Washcloth 3/17/2020  3:45 PM   Periwound Protectant Skin Moisturizer;Barrier Paste 3/17/2020  3:45 PM   Dressing Options Hydrofiber Silver;Compression Wrap Two Layer 3/17/2020  3:45 PM   Dressing Cleansing/Solutions Normal Saline 3/10/2020  4:00 PM   Dressing Changed Changed 3/17/2020  3:45 PM   Dressing Status Clean;Dry;Intact 3/10/2020  4:00 PM   Dressing Change/Treatment Frequency Weekly, and As Needed 3/10/2020  4:00 PM   Wound Odor None 3/17/2020  3:45 PM   Pulses Left;2+;DP;PT 2/4/2020  3:30 PM   Exposed Structures None 3/17/2020  3:45 PM   WOUND NURSE ONLY - Tissue Type and Percentage Pre-debridement: 100% red moist tissue. 3/17/2020  3:45 PM        PROCEDURE:   -2% viscous lidocaine applied topically to wound bed for approximately 5 minutes prior to debridement  -Curette used to debride wound bed.  Excisional debridement was performed to remove devitalized tissue until healthy, bleeding tissue was visualized.   Total wound surface area 0.7 cm2 debrided.  Tissue debrided into the subcutaneous layer.    -Bleeding controlled with manual pressure.    -Wound care  completed by wound RN, refer to flowsheet  -Patient tolerated the procedure well, without c/o pain or discomfort.       Pertinent Labs and Diagnostics:    Labs:     A1c:   Lab Results   Component Value Date/Time    HBA1C 5.7 (H) 01/23/2020 11:22 AM          VASCULAR STUDIES: BHUPINDER 12/27/2019   Right.    Doppler waveform of the common femoral artery is of high amplitude and    triphasic.    Doppler waveforms at the ankle are brisk and triphasic.    Ankle-brachial index is normal.       Left.    Could not perform ankle-brachial index due to large blisters/ulcers.   Normal toe-brachial index: 0.94   Doppler waveform of the common femoral artery is of high amplitude and    triphasic.    Biphasic waveforms seen at the ankle.         ASSESSMENT AND PLAN:   1. Skin ulcer of left lower leg with fat layer exposed (HCC)      03/15/20: Wound area slightly smaller today, however depth is at skin level.  -Excisional debridement of posterior in clinic today, medically necessary to promote wound healing.  -2 layer compression to manage edema- pt tolerating well  -He is to return to clinic weekly for assessment, debridement, and compression     Wound care: Silver Hydrofiber to manage exudate and bioburden, foam cover dressing, Hypafix tape. 2 layer compression warp      2. Chronic venous stasis  Comments: Pt established at [facility] and had a recent procedure done.no records available.     3. Acquired deformity of left ankle and foot, Charcot's joint (non diabetic)  Patient established with [facility] and received custom shoes and inserts.   -Patient educated to return to [facility] to readjust his custom shoes and inserts part of the insert is folded over into the shoe bed.  Per patient his PCP has referred him to Orthopedics    03/17/20: Ortho referral was sent to [place] due to insurance. Information provided to patient.   -Patient has not scheduled, does not know when he will do so.    PATIENT EDUCATION    -Advised to go to ER for  any increased redness, swelling, drainage, or odor, or if patient develops fever, chills, nausea or vomiting.     Patient was seen for 15 minutes face to face of which > 50% of appointment time was spent on counseling and coordination of care regarding the above.    Please note that this note may have been created using voice recognition software. I have worked with technical experts from [facility] to optimize the interface.  I have made every reasonable attempt to correct obvious errors, but there may be errors of grammar and possibly content that I did not discover before finalizing the note.    N

## 2020-03-17 NOTE — PATIENT INSTRUCTIONS
-Keep dressings clean, dry and covered while bathing. Only change dressings if they become over saturated, soiled or fall off.     -Avoid prolonged standing or sitting without elevating your legs.    -Remove your compression garments if you have severe pain, severe swelling, numbness, color change, or temperature change in your toes. If you need to remove your compression garments, do so by unrolling them. Do not cut the compression garments off, this is to prevent cutting yourself on accident.    -Should you experience any significant changes in your wound(s), such as infection (redness, swelling, localized heat, increased pain, fever > 101 F, chills) or have any questions regarding your home care instructions, please contact the wound center at [phone]. If after hours, contact your primary care physician or go to the hospital emergency room.

## 2020-03-24 ENCOUNTER — NON-PROVIDER VISIT (OUTPATIENT)
Dept: WOUND CARE | Facility: MEDICAL CENTER | Age: 65
End: 2020-03-24
Attending: HOSPITALIST
Payer: MEDICAID

## 2020-03-24 PROCEDURE — 99211 OFF/OP EST MAY X REQ PHY/QHP: CPT

## 2020-03-24 NOTE — CERTIFICATION
Discharge Note      Referring Physician: Shmuel Lozada M.D.  Wound Etiology: Venous insufficiency  Wound location: Left anterolateral LE  ICD-10: I87.2, I83.029,L97.929   Date of Discharge: 3/24/2020    Assessment:  Discharge patient at this time secondary to wound resolution. Thank you for the referral and the opportunity to treat your patient.

## 2020-03-30 ENCOUNTER — APPOINTMENT (OUTPATIENT)
Dept: WOUND CARE | Facility: MEDICAL CENTER | Age: 65
End: 2020-03-30
Attending: HOSPITALIST
Payer: MEDICAID

## 2020-04-06 ENCOUNTER — APPOINTMENT (OUTPATIENT)
Dept: WOUND CARE | Facility: MEDICAL CENTER | Age: 65
End: 2020-04-06
Attending: HOSPITALIST
Payer: MEDICAID

## 2020-04-14 ENCOUNTER — TELEPHONE (OUTPATIENT)
Dept: MEDICAL GROUP | Facility: MEDICAL CENTER | Age: 65
End: 2020-04-14

## 2020-04-14 DIAGNOSIS — L97.929 VENOUS STASIS ULCER OF LEFT LOWER EXTREMITY (HCC): ICD-10-CM

## 2020-04-14 DIAGNOSIS — I87.2 VENOUS STASIS DERMATITIS OF BOTH LOWER EXTREMITIES: ICD-10-CM

## 2020-04-14 DIAGNOSIS — I83.029 VENOUS STASIS ULCER OF LEFT LOWER EXTREMITY (HCC): ICD-10-CM

## 2020-04-15 NOTE — TELEPHONE ENCOUNTER
Pt is requesting a referral to the wound care with [facility] for his leg, Pt and Pt's daughter stated that they released him a week or so ago and his leg appears to be showing signs of light infection as well as needing better care, Pt's daughter is concerned that daphnie to the Er or urgent care would expose Pt to current covid issue. Please advise.

## 2020-04-16 ENCOUNTER — OFFICE VISIT (OUTPATIENT)
Dept: WOUND CARE | Facility: MEDICAL CENTER | Age: 65
DRG: 571 | End: 2020-04-16
Attending: INTERNAL MEDICINE
Payer: MEDICAID

## 2020-04-16 ENCOUNTER — APPOINTMENT (OUTPATIENT)
Dept: RADIOLOGY | Facility: MEDICAL CENTER | Age: 65
DRG: 571 | End: 2020-04-16
Attending: FAMILY MEDICINE
Payer: MEDICAID

## 2020-04-16 ENCOUNTER — HOSPITAL ENCOUNTER (INPATIENT)
Facility: MEDICAL CENTER | Age: 65
LOS: 35 days | DRG: 571 | End: 2020-05-21
Attending: EMERGENCY MEDICINE | Admitting: FAMILY MEDICINE
Payer: MEDICAID

## 2020-04-16 VITALS
TEMPERATURE: 96.6 F | OXYGEN SATURATION: 98 % | HEART RATE: 94 BPM | RESPIRATION RATE: 20 BRPM | DIASTOLIC BLOOD PRESSURE: 69 MMHG | SYSTOLIC BLOOD PRESSURE: 104 MMHG

## 2020-04-16 DIAGNOSIS — I83.029 VENOUS STASIS ULCER OF LEFT LOWER EXTREMITY (HCC): ICD-10-CM

## 2020-04-16 DIAGNOSIS — M14.672 CHARCOT'S JOINT OF LEFT FOOT, NON-DIABETIC: ICD-10-CM

## 2020-04-16 DIAGNOSIS — L03.116 CELLULITIS OF LEFT LOWER EXTREMITY: ICD-10-CM

## 2020-04-16 DIAGNOSIS — I87.2 VENOUS STASIS DERMATITIS OF BOTH LOWER EXTREMITIES: ICD-10-CM

## 2020-04-16 DIAGNOSIS — T14.8XXA WOUND INFECTION: ICD-10-CM

## 2020-04-16 DIAGNOSIS — L08.9 WOUND INFECTION: ICD-10-CM

## 2020-04-16 DIAGNOSIS — L97.929 VENOUS STASIS ULCER OF LEFT LOWER EXTREMITY (HCC): ICD-10-CM

## 2020-04-16 PROBLEM — E87.1 HYPONATREMIA: Status: ACTIVE | Noted: 2020-04-16

## 2020-04-16 LAB
ALBUMIN SERPL BCP-MCNC: 3.1 G/DL (ref 3.2–4.9)
ALBUMIN/GLOB SERPL: 0.9 G/DL
ALP SERPL-CCNC: 100 U/L (ref 30–99)
ALT SERPL-CCNC: 46 U/L (ref 2–50)
ANION GAP SERPL CALC-SCNC: 14 MMOL/L (ref 7–16)
ANISOCYTOSIS BLD QL SMEAR: ABNORMAL
AST SERPL-CCNC: 35 U/L (ref 12–45)
BASOPHILS # BLD AUTO: 0 % (ref 0–1.8)
BASOPHILS # BLD: 0 K/UL (ref 0–0.12)
BILIRUB SERPL-MCNC: 1.4 MG/DL (ref 0.1–1.5)
BUN SERPL-MCNC: 28 MG/DL (ref 8–22)
CALCIUM SERPL-MCNC: 8.4 MG/DL (ref 8.5–10.5)
CHLORIDE SERPL-SCNC: 98 MMOL/L (ref 96–112)
CO2 SERPL-SCNC: 20 MMOL/L (ref 20–33)
CREAT SERPL-MCNC: 1.46 MG/DL (ref 0.5–1.4)
CRP SERPL HS-MCNC: 31.7 MG/DL (ref 0–0.75)
EOSINOPHIL # BLD AUTO: 0.12 K/UL (ref 0–0.51)
EOSINOPHIL NFR BLD: 0.9 % (ref 0–6.9)
ERYTHROCYTE [DISTWIDTH] IN BLOOD BY AUTOMATED COUNT: 52.3 FL (ref 35.9–50)
ERYTHROCYTE [SEDIMENTATION RATE] IN BLOOD BY WESTERGREN METHOD: 97 MM/HOUR (ref 0–20)
GLOBULIN SER CALC-MCNC: 3.6 G/DL (ref 1.9–3.5)
GLUCOSE SERPL-MCNC: 108 MG/DL (ref 65–99)
GRAM STN SPEC: NORMAL
HCT VFR BLD AUTO: 34.2 % (ref 42–52)
HGB BLD-MCNC: 11.4 G/DL (ref 14–18)
LACTATE BLD-SCNC: 1.7 MMOL/L (ref 0.5–2)
LACTATE BLD-SCNC: 2 MMOL/L (ref 0.5–2)
LACTATE BLD-SCNC: 2.1 MMOL/L (ref 0.5–2)
LYMPHOCYTES # BLD AUTO: 0.45 K/UL (ref 1–4.8)
LYMPHOCYTES NFR BLD: 3.5 % (ref 22–41)
MACROCYTES BLD QL SMEAR: ABNORMAL
MANUAL DIFF BLD: NORMAL
MCH RBC QN AUTO: 30.9 PG (ref 27–33)
MCHC RBC AUTO-ENTMCNC: 33.3 G/DL (ref 33.7–35.3)
MCV RBC AUTO: 92.7 FL (ref 81.4–97.8)
MONOCYTES # BLD AUTO: 0.45 K/UL (ref 0–0.85)
MONOCYTES NFR BLD AUTO: 3.5 % (ref 0–13.4)
MORPHOLOGY BLD-IMP: NORMAL
NEUTROPHILS # BLD AUTO: 11.88 K/UL (ref 1.82–7.42)
NEUTROPHILS NFR BLD: 88.6 % (ref 44–72)
NEUTS BAND NFR BLD MANUAL: 3.5 % (ref 0–10)
NRBC # BLD AUTO: 0 K/UL
NRBC BLD-RTO: 0 /100 WBC
PLATELET # BLD AUTO: 215 K/UL (ref 164–446)
PLATELET BLD QL SMEAR: NORMAL
PMV BLD AUTO: 9.5 FL (ref 9–12.9)
POTASSIUM SERPL-SCNC: 3.7 MMOL/L (ref 3.6–5.5)
PROT SERPL-MCNC: 6.7 G/DL (ref 6–8.2)
RBC # BLD AUTO: 3.69 M/UL (ref 4.7–6.1)
RBC BLD AUTO: PRESENT
SIGNIFICANT IND 70042: NORMAL
SITE SITE: NORMAL
SODIUM SERPL-SCNC: 132 MMOL/L (ref 135–145)
SOURCE SOURCE: NORMAL
WBC # BLD AUTO: 12.9 K/UL (ref 4.8–10.8)

## 2020-04-16 PROCEDURE — 96376 TX/PRO/DX INJ SAME DRUG ADON: CPT

## 2020-04-16 PROCEDURE — 87070 CULTURE OTHR SPECIMN AEROBIC: CPT

## 2020-04-16 PROCEDURE — 85007 BL SMEAR W/DIFF WBC COUNT: CPT

## 2020-04-16 PROCEDURE — 85652 RBC SED RATE AUTOMATED: CPT

## 2020-04-16 PROCEDURE — 73701 CT LOWER EXTREMITY W/DYE: CPT | Mod: LT

## 2020-04-16 PROCEDURE — 700111 HCHG RX REV CODE 636 W/ 250 OVERRIDE (IP): Performed by: EMERGENCY MEDICINE

## 2020-04-16 PROCEDURE — 87040 BLOOD CULTURE FOR BACTERIA: CPT

## 2020-04-16 PROCEDURE — 36415 COLL VENOUS BLD VENIPUNCTURE: CPT

## 2020-04-16 PROCEDURE — 85027 COMPLETE CBC AUTOMATED: CPT

## 2020-04-16 PROCEDURE — 96365 THER/PROPH/DIAG IV INF INIT: CPT

## 2020-04-16 PROCEDURE — 700105 HCHG RX REV CODE 258: Performed by: FAMILY MEDICINE

## 2020-04-16 PROCEDURE — 80053 COMPREHEN METABOLIC PANEL: CPT

## 2020-04-16 PROCEDURE — 700105 HCHG RX REV CODE 258: Performed by: EMERGENCY MEDICINE

## 2020-04-16 PROCEDURE — 96375 TX/PRO/DX INJ NEW DRUG ADDON: CPT

## 2020-04-16 PROCEDURE — 99213 OFFICE O/P EST LOW 20 MIN: CPT

## 2020-04-16 PROCEDURE — 700117 HCHG RX CONTRAST REV CODE 255: Performed by: FAMILY MEDICINE

## 2020-04-16 PROCEDURE — 99213 OFFICE O/P EST LOW 20 MIN: CPT | Performed by: NURSE PRACTITIONER

## 2020-04-16 PROCEDURE — 700102 HCHG RX REV CODE 250 W/ 637 OVERRIDE(OP): Performed by: FAMILY MEDICINE

## 2020-04-16 PROCEDURE — 83605 ASSAY OF LACTIC ACID: CPT | Mod: 91

## 2020-04-16 PROCEDURE — 87205 SMEAR GRAM STAIN: CPT

## 2020-04-16 PROCEDURE — 700111 HCHG RX REV CODE 636 W/ 250 OVERRIDE (IP): Performed by: FAMILY MEDICINE

## 2020-04-16 PROCEDURE — 87077 CULTURE AEROBIC IDENTIFY: CPT

## 2020-04-16 PROCEDURE — A9270 NON-COVERED ITEM OR SERVICE: HCPCS | Performed by: FAMILY MEDICINE

## 2020-04-16 PROCEDURE — 770006 HCHG ROOM/CARE - MED/SURG/GYN SEMI*

## 2020-04-16 PROCEDURE — 86140 C-REACTIVE PROTEIN: CPT

## 2020-04-16 PROCEDURE — 99285 EMERGENCY DEPT VISIT HI MDM: CPT

## 2020-04-16 PROCEDURE — 87147 CULTURE TYPE IMMUNOLOGIC: CPT

## 2020-04-16 PROCEDURE — 87186 SC STD MICRODIL/AGAR DIL: CPT

## 2020-04-16 PROCEDURE — 99223 1ST HOSP IP/OBS HIGH 75: CPT | Performed by: FAMILY MEDICINE

## 2020-04-16 RX ORDER — LINEZOLID 600 MG/1
600 TABLET, FILM COATED ORAL EVERY 12 HOURS
Status: DISCONTINUED | OUTPATIENT
Start: 2020-04-16 | End: 2020-04-18

## 2020-04-16 RX ORDER — SERTRALINE HYDROCHLORIDE 100 MG/1
100 TABLET, FILM COATED ORAL DAILY
Status: DISCONTINUED | OUTPATIENT
Start: 2020-04-17 | End: 2020-04-25

## 2020-04-16 RX ORDER — ONDANSETRON 2 MG/ML
4 INJECTION INTRAMUSCULAR; INTRAVENOUS EVERY 4 HOURS PRN
Status: DISCONTINUED | OUTPATIENT
Start: 2020-04-16 | End: 2020-05-21 | Stop reason: HOSPADM

## 2020-04-16 RX ORDER — PROCHLORPERAZINE EDISYLATE 5 MG/ML
5-10 INJECTION INTRAMUSCULAR; INTRAVENOUS EVERY 4 HOURS PRN
Status: DISCONTINUED | OUTPATIENT
Start: 2020-04-16 | End: 2020-05-21 | Stop reason: HOSPADM

## 2020-04-16 RX ORDER — MORPHINE SULFATE 4 MG/ML
2-4 INJECTION, SOLUTION INTRAMUSCULAR; INTRAVENOUS EVERY 4 HOURS PRN
Status: DISCONTINUED | OUTPATIENT
Start: 2020-04-16 | End: 2020-05-21 | Stop reason: HOSPADM

## 2020-04-16 RX ORDER — HYDRALAZINE HYDROCHLORIDE 20 MG/ML
10 INJECTION INTRAMUSCULAR; INTRAVENOUS EVERY 6 HOURS PRN
Status: DISCONTINUED | OUTPATIENT
Start: 2020-04-16 | End: 2020-05-19

## 2020-04-16 RX ORDER — ACETAMINOPHEN 325 MG/1
650 TABLET ORAL EVERY 6 HOURS PRN
Status: DISCONTINUED | OUTPATIENT
Start: 2020-04-16 | End: 2020-05-21 | Stop reason: HOSPADM

## 2020-04-16 RX ORDER — AMITRIPTYLINE HYDROCHLORIDE 25 MG/1
25 TABLET, FILM COATED ORAL NIGHTLY PRN
Status: DISCONTINUED | OUTPATIENT
Start: 2020-04-16 | End: 2020-04-25

## 2020-04-16 RX ORDER — SODIUM CHLORIDE 9 MG/ML
INJECTION, SOLUTION INTRAVENOUS CONTINUOUS
Status: DISCONTINUED | OUTPATIENT
Start: 2020-04-16 | End: 2020-04-19

## 2020-04-16 RX ORDER — PROMETHAZINE HYDROCHLORIDE 25 MG/1
12.5-25 TABLET ORAL EVERY 4 HOURS PRN
Status: DISCONTINUED | OUTPATIENT
Start: 2020-04-16 | End: 2020-05-21 | Stop reason: HOSPADM

## 2020-04-16 RX ORDER — PROMETHAZINE HYDROCHLORIDE 12.5 MG/1
12.5-25 SUPPOSITORY RECTAL EVERY 4 HOURS PRN
Status: DISCONTINUED | OUTPATIENT
Start: 2020-04-16 | End: 2020-05-19

## 2020-04-16 RX ORDER — ONDANSETRON 4 MG/1
4 TABLET, ORALLY DISINTEGRATING ORAL EVERY 4 HOURS PRN
Status: DISCONTINUED | OUTPATIENT
Start: 2020-04-16 | End: 2020-05-21 | Stop reason: HOSPADM

## 2020-04-16 RX ORDER — HYDROMORPHONE HYDROCHLORIDE 1 MG/ML
0.5 INJECTION, SOLUTION INTRAMUSCULAR; INTRAVENOUS; SUBCUTANEOUS ONCE
Status: COMPLETED | OUTPATIENT
Start: 2020-04-16 | End: 2020-04-16

## 2020-04-16 RX ORDER — ATORVASTATIN CALCIUM 40 MG/1
40 TABLET, FILM COATED ORAL DAILY
Status: DISCONTINUED | OUTPATIENT
Start: 2020-04-17 | End: 2020-05-21 | Stop reason: HOSPADM

## 2020-04-16 RX ORDER — ONDANSETRON 2 MG/ML
4 INJECTION INTRAMUSCULAR; INTRAVENOUS ONCE
Status: COMPLETED | OUTPATIENT
Start: 2020-04-16 | End: 2020-04-16

## 2020-04-16 RX ORDER — OXYCODONE HYDROCHLORIDE 5 MG/1
5-10 TABLET ORAL EVERY 4 HOURS PRN
Status: DISCONTINUED | OUTPATIENT
Start: 2020-04-16 | End: 2020-05-21 | Stop reason: HOSPADM

## 2020-04-16 RX ORDER — ACETAMINOPHEN 500 MG
1000 TABLET ORAL
COMMUNITY
End: 2020-09-03

## 2020-04-16 RX ORDER — FERROUS GLUCONATE 324(38)MG
324 TABLET ORAL
Status: DISCONTINUED | OUTPATIENT
Start: 2020-04-17 | End: 2020-05-21 | Stop reason: HOSPADM

## 2020-04-16 RX ORDER — HEPARIN SODIUM 5000 [USP'U]/ML
5000 INJECTION, SOLUTION INTRAVENOUS; SUBCUTANEOUS EVERY 8 HOURS
Status: DISPENSED | OUTPATIENT
Start: 2020-04-16 | End: 2020-04-24

## 2020-04-16 RX ADMIN — HYDROMORPHONE HYDROCHLORIDE 0.5 MG: 1 INJECTION, SOLUTION INTRAMUSCULAR; INTRAVENOUS; SUBCUTANEOUS at 12:30

## 2020-04-16 RX ADMIN — PIPERACILLIN AND TAZOBACTAM 3.38 G: 3; .375 INJECTION, POWDER, LYOPHILIZED, FOR SOLUTION INTRAVENOUS; PARENTERAL at 17:27

## 2020-04-16 RX ADMIN — OXYCODONE 10 MG: 5 TABLET ORAL at 17:28

## 2020-04-16 RX ADMIN — IOHEXOL 100 ML: 350 INJECTION, SOLUTION INTRAVENOUS at 14:09

## 2020-04-16 RX ADMIN — HYDROMORPHONE HYDROCHLORIDE 0.5 MG: 1 INJECTION, SOLUTION INTRAMUSCULAR; INTRAVENOUS; SUBCUTANEOUS at 13:39

## 2020-04-16 RX ADMIN — ONDANSETRON 4 MG: 2 INJECTION INTRAMUSCULAR; INTRAVENOUS at 12:30

## 2020-04-16 RX ADMIN — ACETAMINOPHEN 650 MG: 325 TABLET, FILM COATED ORAL at 15:57

## 2020-04-16 RX ADMIN — HEPARIN SODIUM 5000 UNITS: 5000 INJECTION, SOLUTION INTRAVENOUS; SUBCUTANEOUS at 15:57

## 2020-04-16 RX ADMIN — SODIUM CHLORIDE: 9 INJECTION, SOLUTION INTRAVENOUS at 15:58

## 2020-04-16 RX ADMIN — MORPHINE SULFATE 2 MG: 4 INJECTION INTRAVENOUS at 20:04

## 2020-04-16 RX ADMIN — PIPERACILLIN AND TAZOBACTAM 4.5 G: 4; .5 INJECTION, POWDER, LYOPHILIZED, FOR SOLUTION INTRAVENOUS; PARENTERAL at 13:39

## 2020-04-16 RX ADMIN — LINEZOLID 600 MG: 600 TABLET, FILM COATED ORAL at 17:28

## 2020-04-16 ASSESSMENT — COGNITIVE AND FUNCTIONAL STATUS - GENERAL
DAILY ACTIVITIY SCORE: 22
HELP NEEDED FOR BATHING: A LITTLE
SUGGESTED CMS G CODE MODIFIER DAILY ACTIVITY: CJ
WALKING IN HOSPITAL ROOM: A LITTLE
SUGGESTED CMS G CODE MODIFIER MOBILITY: CJ
CLIMB 3 TO 5 STEPS WITH RAILING: A LITTLE
DRESSING REGULAR LOWER BODY CLOTHING: A LITTLE
MOBILITY SCORE: 22

## 2020-04-16 ASSESSMENT — ENCOUNTER SYMPTOMS
FEVER: 0
DEPRESSION: 0
NAUSEA: 0
DIZZINESS: 0
DIARRHEA: 0
SENSORY CHANGE: 0
NERVOUS/ANXIOUS: 0
SENSORY CHANGE: 0
CHILLS: 0
SHORTNESS OF BREATH: 0
HEARTBURN: 0
ABDOMINAL PAIN: 0
NECK PAIN: 0
WHEEZING: 0
HEADACHES: 0
NAUSEA: 0
BACK PAIN: 0
FLANK PAIN: 0
DIARRHEA: 0
VOMITING: 0
WEAKNESS: 0
COUGH: 0
BLURRED VISION: 0
FEVER: 0
DIAPHORESIS: 0
SORE THROAT: 0
CHILLS: 0
MYALGIAS: 0
DIZZINESS: 0
VOMITING: 0
WHEEZING: 0
SPEECH CHANGE: 0
SHORTNESS OF BREATH: 0
SPEECH CHANGE: 0
HEADACHES: 0
ABDOMINAL PAIN: 0
COUGH: 0
PALPITATIONS: 0
SPUTUM PRODUCTION: 0
MYALGIAS: 0
FOCAL WEAKNESS: 0
PALPITATIONS: 0

## 2020-04-16 ASSESSMENT — PATIENT HEALTH QUESTIONNAIRE - PHQ9
8. MOVING OR SPEAKING SO SLOWLY THAT OTHER PEOPLE COULD HAVE NOTICED. OR THE OPPOSITE, BEING SO FIGETY OR RESTLESS THAT YOU HAVE BEEN MOVING AROUND A LOT MORE THAN USUAL: NOT AT ALL
2. FEELING DOWN, DEPRESSED, IRRITABLE, OR HOPELESS: NEARLY EVERY DAY
7. TROUBLE CONCENTRATING ON THINGS, SUCH AS READING THE NEWSPAPER OR WATCHING TELEVISION: MORE THAN HALF THE DAYS
3. TROUBLE FALLING OR STAYING ASLEEP OR SLEEPING TOO MUCH: SEVERAL DAYS
5. POOR APPETITE OR OVEREATING: NOT AT ALL
SUM OF ALL RESPONSES TO PHQ QUESTIONS 1-9: 13
4. FEELING TIRED OR HAVING LITTLE ENERGY: NEARLY EVERY DAY
SUM OF ALL RESPONSES TO PHQ9 QUESTIONS 1 AND 2: 6
1. LITTLE INTEREST OR PLEASURE IN DOING THINGS: NEARLY EVERY DAY
9. THOUGHTS THAT YOU WOULD BE BETTER OFF DEAD, OR OF HURTING YOURSELF: NOT AT ALL
6. FEELING BAD ABOUT YOURSELF - OR THAT YOU ARE A FAILURE OR HAVE LET YOURSELF OR YOUR FAMILY DOWN: SEVERAL DAYS

## 2020-04-16 ASSESSMENT — LIFESTYLE VARIABLES
EVER_SMOKED: YES
DOES PATIENT WANT TO STOP DRINKING: NO
ALCOHOL_USE: YES
HOW MANY TIMES IN THE PAST YEAR HAVE YOU HAD 5 OR MORE DRINKS IN A DAY: 0
HAVE YOU EVER FELT YOU SHOULD CUT DOWN ON YOUR DRINKING: NO
CONSUMPTION TOTAL: NEGATIVE
EVER FELT BAD OR GUILTY ABOUT YOUR DRINKING: NO
TOTAL SCORE: 0
HAVE PEOPLE ANNOYED YOU BY CRITICIZING YOUR DRINKING: NO
DO YOU DRINK ALCOHOL: NO
TOTAL SCORE: 0
EVER HAD A DRINK FIRST THING IN THE MORNING TO STEADY YOUR NERVES TO GET RID OF A HANGOVER: NO
ON A TYPICAL DAY WHEN YOU DRINK ALCOHOL HOW MANY DRINKS DO YOU HAVE: 2
TOTAL SCORE: 0
SUBSTANCE_ABUSE: 0
AVERAGE NUMBER OF DAYS PER WEEK YOU HAVE A DRINK CONTAINING ALCOHOL: 0

## 2020-04-16 ASSESSMENT — FIBROSIS 4 INDEX
FIB4 SCORE: 1.17
FIB4 SCORE: 1.54

## 2020-04-16 NOTE — ED PROVIDER NOTES
ED Provider Note    Scribed for Raul Gibson M.D. by Charity Ying. 4/16/2020  11:34 AM    Primary care provider: Marcela Ch M.D.  Means of arrival: Wheel chair  History obtained from: Patient   History limited by: None    CHIEF COMPLAINT  Chief Complaint   Patient presents with   • Wound Check     Pt reports to have left leg wound, being seen at wound care, sent by MD for further care. pt denies fever, but states wounds on leg are draining.        HPI  Goran Chavez is a 64 y.o. male who presents to the Emergency Department for evaluation of a worsening left lower extremity wound that began several months ago. The patient describes that his left lower leg is erythematous, swollen, with some drainage. He was seen and evaluated by his PCP today, who instructed him to come to the ED for further evaluation. Patient has been treated at wound care regularly prior to his visit today. No alleviating or exacerbating factors were identified. He reports associated purulent drainage, and increased warmth. He denies a associated fever, or chills.    REVIEW OF SYSTEMS  Pertinent positives include left lower extremity wound, edema, erythema, purulent drainage, and increased warmth.  Pertinent negatives include no fever, or chills.    All other systems reviewed and negative. See HPI for further details.       PAST MEDICAL HISTORY   has a past medical history of Anxiety, Charcot's joint of left foot, non-diabetic (3/21/2016), Chronic congestive heart failure (HCC) (11/16/2017), Hepatitis C, chronic (HCC), Hypertension, Migraine, Polysubstance abuse (HCC) (3/8/2018), Tobacco use (4/18/2016), Ulcer of left lower extremity with necrosis of muscle (HCC) (3/21/2016), and Venous stasis ulcer ([facility]) (2017).    SURGICAL HISTORY   has a past surgical history that includes drain skin abscess simple (Left); tibia orif (Right, 1997); shoulder orif (Left, 1997); and hand surgery (Left).    SOCIAL HISTORY  Social History      Tobacco Use   • Smoking status: Former Smoker     Packs/day: 0.00     Years: 48.00     Pack years: 0.00     Types: Cigarettes     Last attempt to quit: 2018     Years since quittin.3   • Smokeless tobacco: Never Used   • Tobacco comment: states he is using 7mg nicotine patch   Substance Use Topics   • Alcohol use: No     Alcohol/week: 7.2 oz     Types: 12 Cans of beer per week     Comment: history of alcoholism    • Drug use: Yes     Types: Marijuana, Inhaled, Methamphetamines     Comment: MJ every day, intermittent meth use      Social History     Substance and Sexual Activity   Drug Use Yes   • Types: Marijuana, Inhaled, Methamphetamines    Comment: MJ every day, intermittent meth use       FAMILY HISTORY  Family History   Problem Relation Age of Onset   • Lung Disease Mother 70         from COPD   • Hypertension Mother    • Other Father 82         from brain aneurysm after fall   • Cancer Neg Hx    • Heart Disease Neg Hx    • Stroke Neg Hx    • Diabetes Neg Hx        CURRENT MEDICATIONS  Home Medications    **Home medications have not yet been reviewed for this encounter**         ALLERGIES  Allergies   Allergen Reactions   • Norco [Hydrocodone-Acetaminophen] Itching       PHYSICAL EXAM  VITAL SIGNS: /69   Pulse 86   Temp 36.1 °C (97 °F) (Temporal)   Resp 14   Ht 1.829 m (6')   Wt 83.5 kg (184 lb)   SpO2 100%   BMI 24.95 kg/m²     Nursing note and vitals reviewed.  Constitutional: Well-developed and well-nourished. No distress.   HENT: Head is normocephalic and atraumatic. Oropharynx is clear and moist without exudate or erythema.   Eyes: Pupils are equal, round, and reactive to light. Conjunctiva are normal.   Cardiovascular: Normal rate and regular rhythm. No murmur heard. Normal radial pulses.  Pulmonary/Chest: Breath sounds normal. No wheezes or rales.   Abdominal: Soft and non-tender. No distention    Musculoskeletal: Charcot's feet, erythema and ulceration involving entire  left lower leg from the knee to the foot, chronic venostasis changes to the right lower extremity. Extremities exhibit normal range of motion.  Neurological: Awake, alert and oriented to person, place, and time. No focal deficits noted.  Skin: Skin is warm and dry. No rash.   Psychiatric: Normal mood and affect. Appropriate for clinical situation.    DIAGNOSTIC STUDIES / PROCEDURES    LABS  Results for orders placed or performed during the hospital encounter of 04/16/20   LACTIC ACID   Result Value Ref Range    Lactic Acid 2.1 (H) 0.5 - 2.0 mmol/L   CBC WITH DIFFERENTIAL   Result Value Ref Range    WBC 12.9 (H) 4.8 - 10.8 K/uL    RBC 3.69 (L) 4.70 - 6.10 M/uL    Hemoglobin 11.4 (L) 14.0 - 18.0 g/dL    Hematocrit 34.2 (L) 42.0 - 52.0 %    MCV 92.7 81.4 - 97.8 fL    MCH 30.9 27.0 - 33.0 pg    MCHC 33.3 (L) 33.7 - 35.3 g/dL    RDW 52.3 (H) 35.9 - 50.0 fL    Platelet Count 215 164 - 446 K/uL    MPV 9.5 9.0 - 12.9 fL    Neutrophils-Polys 88.60 (H) 44.00 - 72.00 %    Lymphocytes 3.50 (L) 22.00 - 41.00 %    Monocytes 3.50 0.00 - 13.40 %    Eosinophils 0.90 0.00 - 6.90 %    Basophils 0.00 0.00 - 1.80 %    Nucleated RBC 0.00 /100 WBC    Neutrophils (Absolute) 11.88 (H) 1.82 - 7.42 K/uL    Lymphs (Absolute) 0.45 (L) 1.00 - 4.80 K/uL    Monos (Absolute) 0.45 0.00 - 0.85 K/uL    Eos (Absolute) 0.12 0.00 - 0.51 K/uL    Baso (Absolute) 0.00 0.00 - 0.12 K/uL    NRBC (Absolute) 0.00 K/uL    Anisocytosis 1+     Macrocytosis 1+    COMP METABOLIC PANEL   Result Value Ref Range    Sodium 132 (L) 135 - 145 mmol/L    Potassium 3.7 3.6 - 5.5 mmol/L    Chloride 98 96 - 112 mmol/L    Co2 20 20 - 33 mmol/L    Anion Gap 14.0 7.0 - 16.0    Glucose 108 (H) 65 - 99 mg/dL    Bun 28 (H) 8 - 22 mg/dL    Creatinine 1.46 (H) 0.50 - 1.40 mg/dL    Calcium 8.4 (L) 8.5 - 10.5 mg/dL    AST(SGOT) 35 12 - 45 U/L    ALT(SGPT) 46 2 - 50 U/L    Alkaline Phosphatase 100 (H) 30 - 99 U/L    Total Bilirubin 1.4 0.1 - 1.5 mg/dL    Albumin 3.1 (L) 3.2 - 4.9 g/dL     Total Protein 6.7 6.0 - 8.2 g/dL    Globulin 3.6 (H) 1.9 - 3.5 g/dL    A-G Ratio 0.9 g/dL   CRP QUANTITIVE (NON-CARDIAC)   Result Value Ref Range    Stat C-Reactive Protein 31.70 (H) 0.00 - 0.75 mg/dL   ESTIMATED GFR   Result Value Ref Range    GFR If  59 (A) >60 mL/min/1.73 m 2    GFR If Non  48 (A) >60 mL/min/1.73 m 2   DIFFERENTIAL MANUAL   Result Value Ref Range    Bands-Stabs 3.50 0.00 - 10.00 %    Manual Diff Status PERFORMED    PERIPHERAL SMEAR REVIEW   Result Value Ref Range    Peripheral Smear Review see below    PLATELET ESTIMATE   Result Value Ref Range    Plt Estimation Normal    MORPHOLOGY   Result Value Ref Range    RBC Morphology Present      All labs reviewed by me.      COURSE & MEDICAL DECISION MAKING  Nursing notes, VS, PMSFHx reviewed in chart.     11:34 AM - Patient seen and examined at bedside. Discussed plan of care with patient. I informed them that labs will be ordered to evaluate symptoms. Patient is understanding and agreeable with plan.  Ordered Blood culture, CMP, CBC, lactic acid, CRP to evaluate his symptoms. The differential diagnoses include but are not limited to: Sepsis, wound infection, cellulitis    11:40 PM - Ordered Morphology, Platelet estimate, peripheral smear review, differential manual, and GFR to evaluate.     12:57 PM - Paged hospitalist.     1:05 PM - Patient will be treated with Zosyn 4.5 g in  ml for his symptoms.    I discussed the patient's case and the above findings with Dr. Gonzalez (hospitalist) who agreed to evaluate the patient for hospitalization.    On repeat evaluation, the patient is resting comfortably in bed with no new complaints.      DISPOSITION:  Patient will be hospitalized by Dr. Gonzalez in guarded condition.      FINAL IMPRESSION  1. Wound infection    2. Cellulitis of left lower extremity          ICharity (Scribe), am scribing for, and in the presence of, Raul Gibson M.D..    Electronically signed  by: Charity Ying (Scribe), 4/16/2020    IRaul M.D. personally performed the services described in this documentation, as scribed by Charity Ying in my presence, and it is both accurate and complete.    C    The note accurately reflects work and decisions made by me.  Raul Gibson M.D.  4/16/2020  1:41 PM

## 2020-04-16 NOTE — ED TRIAGE NOTES
Chief Complaint   Patient presents with   • Wound Check     Pt reports to have left leg wound, being seen at wound care, sent by MD for further care. pt denies fever, but states wounds on leg are draining.      Pt/staff masked and in appropriate PPE during encounter.  Pt denies fever/travel or being in contact with anyone testing positive for COVID/Corona.  Explained to pt triage process, made pt aware to tell this RN/staff of any changes/concerns, pt verbalized understanding of process and instructions given. Pt to ER lobby.

## 2020-04-16 NOTE — PROGRESS NOTES
D/W Dr. Gibson  63yo male with left leg wound followed by wound care, sent to ER today for increasing purulent drainage, warmth, redness  WBC 12.9, lactate 2.1 but afebrile, bp fine, not tachycardic  Blood cx drawn  Acute on chronic renal insufficiency  Vanco and zosyn started in ER.  I requested wound cx to be collected in ER.    Will place inpt admit order to med floor, Dr. Lopez admitting.

## 2020-04-16 NOTE — ASSESSMENT & PLAN NOTE
Intermittently hypertensive.   - continue home amlodipine.  Started on losartan during admission  - PRN hydralazine

## 2020-04-16 NOTE — PROGRESS NOTES
· 2 RN skin check complete. KEN Rothman  · Devices in place n/a.  · Skin assessed under devices n/a.  · Confirmed pressure ulcers found on n/a.  · New potential pressure ulcers noted on n/a. Wound consult placed? n/a. Photo uploaded? Yes of penis, prior LLE wound pic uploaded earlier today.   · The following interventions are in place barrier cream to periarea, wound care consulted.  · Skin issues noted: LLE cellulitis w/weeping. Excoriated periaea, open areas on penis.

## 2020-04-16 NOTE — ED NOTES
Med rec complete per phone interview with family member. Allergies reviewed. No ABX in last 14 days.

## 2020-04-16 NOTE — ASSESSMENT & PLAN NOTE
Acute on chronic resolved. Cr now normalized.   - avoid nephrotoxic agents; renally dose medications  Monitor kidney function and urine output

## 2020-04-16 NOTE — ASSESSMENT & PLAN NOTE
Positive UDS for amphetamine, oxycodone and marijuana.  - continued counseling and education  Not candidate for outpatient infusion for antibiotics

## 2020-04-16 NOTE — H&P
Hospital Medicine History & Physical Note    Date of Service  4/16/2020    Primary Care Physician  Marcela Ch M.D.    Consultants  None    Code Status  Full    Chief Complaint  Left leg redness and swelling.    History of Presenting Illness  64 y.o. male who presented 4/16/2020 with left leg redness and swelling.  Patient has no history of venous stasis dermatitis of both legs, and venous stasis ulcers of the left leg.  Patient states that he was discharged from wound care month ago and said the ulcers had resolved.  But then a week ago he noted that ulcers came back he also noticed increasing redness and swelling and pain.  He denied having any fever or chills, cough or congestion, shortness of breath, abdominal pain, nausea or vomiting, or diarrhea.  On review of his previous wound cultures, he has history of MRSA, Pseudomonas, Serratia, and group B streptococcus.    Review of Systems  Review of Systems   Constitutional: Negative for chills, diaphoresis, fever and malaise/fatigue.   HENT: Negative for hearing loss and sore throat.    Eyes: Negative for blurred vision.   Respiratory: Negative for cough, shortness of breath and wheezing.    Cardiovascular: Positive for leg swelling. Negative for chest pain and palpitations.   Gastrointestinal: Negative for abdominal pain, diarrhea, heartburn, nausea and vomiting.   Genitourinary: Negative for dysuria, flank pain and hematuria.   Musculoskeletal: Negative for back pain, joint pain, myalgias and neck pain.   Skin: Negative for rash.   Neurological: Negative for dizziness, sensory change, speech change, focal weakness, weakness and headaches.   Psychiatric/Behavioral: The patient is not nervous/anxious.        Past Medical History   has a past medical history of Anxiety, Charcot's joint of left foot, non-diabetic (3/21/2016), Hepatitis C, chronic (HCC), Hypertension, Kidney disease, Migraine, Polysubstance abuse (HCC) (3/8/2018), Tobacco use (4/18/2016), Ulcer  of left lower extremity with necrosis of muscle (HCC) (3/21/2016), and Venous stasis ulcer (HCC) (2017).    Surgical History   has a past surgical history that includes pr drain skin abscess simple (Left); tibia orif (Right, 1997); shoulder orif (Left, 1997); and hand surgery (Left).     Family History  family history includes Hypertension in his mother; Lung Disease (age of onset: 70) in his mother; Other (age of onset: 82) in his father.     Social History   reports that he quit smoking about 16 months ago. His smoking use included cigarettes. He smoked 0.00 packs per day for 48.00 years. He has never used smokeless tobacco. He reports current drug use. Drugs: Marijuana, Inhaled, and Methamphetamines. He reports that he does not drink alcohol.    Allergies  Allergies   Allergen Reactions   • Norco [Hydrocodone-Acetaminophen] Itching       Medications  Prior to Admission Medications   Prescriptions Last Dose Informant Patient Reported? Taking?   amLODIPine (NORVASC) 10 MG Tab   No No   Sig: Take 1 Tab by mouth every day.   amitriptyline (ELAVIL) 25 MG Tab   No No   Sig: Take 1 Tab by mouth at bedtime as needed for Sleep.   ascorbic acid (VITAMIN C) 500 MG tablet   No No   Sig: Take 1 Tab by mouth every day.   atorvastatin (LIPITOR) 40 MG Tab   No No   Sig: Take 1 Tab by mouth every day.   ferrous gluconate (FERGON) 324 (38 Fe) MG Tab   No No   Sig: Take 1 Tab by mouth every morning with breakfast.   sertraline (ZOLOFT) 100 MG Tab   No No   Sig: Take 1 Tab by mouth every day.      Facility-Administered Medications: None       Physical Exam  Temp:  [36.1 °C (97 °F)] 36.1 °C (97 °F)  Pulse:  [80-88] 80  Resp:  [14-26] 26  BP: (102-132)/(69-84) 120/80  SpO2:  [96 %-100 %] 97 %    Physical Exam  Vitals signs and nursing note reviewed.   HENT:      Head: Normocephalic and atraumatic.      Nose: No congestion.      Mouth/Throat:      Mouth: Mucous membranes are moist.   Eyes:      Conjunctiva/sclera: Conjunctivae normal.       Pupils: Pupils are equal, round, and reactive to light.   Neck:      Musculoskeletal: No muscular tenderness.   Cardiovascular:      Rate and Rhythm: Normal rate and regular rhythm.      Heart sounds: Murmur present.   Pulmonary:      Effort: Pulmonary effort is normal.      Breath sounds: Normal breath sounds.   Abdominal:      General: Bowel sounds are normal. There is no distension.      Palpations: Abdomen is soft.      Tenderness: There is no abdominal tenderness. There is no guarding or rebound.   Musculoskeletal:         General: Swelling (left leg) present.      Right lower leg: Edema present.      Left lower leg: Edema present.   Lymphadenopathy:      Cervical: No cervical adenopathy.   Skin:     Findings: Erythema (left leg with multiple ulcers) present.   Neurological:      General: No focal deficit present.      Mental Status: He is alert and oriented to person, place, and time.      Cranial Nerves: No cranial nerve deficit.         Laboratory:  Recent Labs     04/16/20  1140   WBC 12.9*   RBC 3.69*   HEMOGLOBIN 11.4*   HEMATOCRIT 34.2*   MCV 92.7   MCH 30.9   MCHC 33.3*   RDW 52.3*   PLATELETCT 215   MPV 9.5     Recent Labs     04/16/20  1140   SODIUM 132*   POTASSIUM 3.7   CHLORIDE 98   CO2 20   GLUCOSE 108*   BUN 28*   CREATININE 1.46*   CALCIUM 8.4*     Recent Labs     04/16/20  1140   ALTSGPT 46   ASTSGOT 35   ALKPHOSPHAT 100*   TBILIRUBIN 1.4   GLUCOSE 108*         No results for input(s): NTPROBNP in the last 72 hours.      No results for input(s): TROPONINT in the last 72 hours.    Urinalysis:    No results found     Imaging:  CT-EXTREMITY, LOWER WITH LEFT    (Results Pending)         Assessment/Plan:  I anticipate this patient will require at least two midnights for appropriate medical management, necessitating inpatient admission.    * Cellulitis of left lower extremity- (present on admission)  Assessment & Plan  Start Zyvox, Zosyn  Check CT lower extremity  Keep NPO  Follow  cultures    Venous stasis ulcer of left lower extremity (HCC)- (present on admission)  Assessment & Plan  Consult Wound care    Hyponatremia- (present on admission)  Assessment & Plan  IVF NS, follow bmp    Venous stasis dermatitis of both lower extremities- (present on admission)  Assessment & Plan  Consult wound care    CKD (chronic kidney disease) stage 3, GFR 30-59 ml/min ([facility])- (present on admission)  Assessment & Plan  IVF hydration, follow bmp    Essential hypertension- (present on admission)  Assessment & Plan  IV Hydralazine as needed with parameters    Methamphetamine abuse ([facility])- (present on admission)  Assessment & Plan  Check UDS        VTE prophylaxis: Heparin

## 2020-04-16 NOTE — ASSESSMENT & PLAN NOTE
Follow deep operative cultures  S/P washout 4/22 and 4/24  Cultures 4/22 thus far (+) for group A strep and Pseudomonas

## 2020-04-16 NOTE — ASSESSMENT & PLAN NOTE
With surrounding cellulitis improving. Arterial US negative for arterial insufficiency.  - s/p washout on 4/22, 4/24, 4/27 and 5/4  - ID and orthopedic surgery following, appreciate recs  Completed course of antibiotics per ID  - continue with wound care; per LPS, tendon still visible so recommending surgery for skin graft be postponed now until 5/18

## 2020-04-16 NOTE — ED NOTES
Verbalized decrease and relief of pain, now rates as 5/10. 2nd set of BC collected sent to lab.  Vitals remains stable. Denies any new needs. Call light within reach. Instructed to call staff for any assistance.

## 2020-04-17 LAB
ALBUMIN SERPL BCP-MCNC: 2.7 G/DL (ref 3.2–4.9)
ALBUMIN/GLOB SERPL: 0.8 G/DL
ALP SERPL-CCNC: 96 U/L (ref 30–99)
ALT SERPL-CCNC: 36 U/L (ref 2–50)
AMPHET UR QL SCN: POSITIVE
ANION GAP SERPL CALC-SCNC: 13 MMOL/L (ref 7–16)
AST SERPL-CCNC: 21 U/L (ref 12–45)
BARBITURATES UR QL SCN: NEGATIVE
BASOPHILS # BLD AUTO: 0.9 % (ref 0–1.8)
BASOPHILS # BLD: 0.11 K/UL (ref 0–0.12)
BENZODIAZ UR QL SCN: NEGATIVE
BILIRUB SERPL-MCNC: 1.2 MG/DL (ref 0.1–1.5)
BUN SERPL-MCNC: 28 MG/DL (ref 8–22)
BURR CELLS BLD QL SMEAR: NORMAL
BZE UR QL SCN: NEGATIVE
CALCIUM SERPL-MCNC: 8.2 MG/DL (ref 8.5–10.5)
CANNABINOIDS UR QL SCN: POSITIVE
CHLORIDE SERPL-SCNC: 100 MMOL/L (ref 96–112)
CO2 SERPL-SCNC: 21 MMOL/L (ref 20–33)
CREAT SERPL-MCNC: 1.3 MG/DL (ref 0.5–1.4)
EOSINOPHIL # BLD AUTO: 0.11 K/UL (ref 0–0.51)
EOSINOPHIL NFR BLD: 0.9 % (ref 0–6.9)
ERYTHROCYTE [DISTWIDTH] IN BLOOD BY AUTOMATED COUNT: 53.3 FL (ref 35.9–50)
GLOBULIN SER CALC-MCNC: 3.6 G/DL (ref 1.9–3.5)
GLUCOSE SERPL-MCNC: 137 MG/DL (ref 65–99)
HCT VFR BLD AUTO: 33.5 % (ref 42–52)
HGB BLD-MCNC: 10.8 G/DL (ref 14–18)
LYMPHOCYTES # BLD AUTO: 0.55 K/UL (ref 1–4.8)
LYMPHOCYTES NFR BLD: 4.3 % (ref 22–41)
MANUAL DIFF BLD: NORMAL
MCH RBC QN AUTO: 30.5 PG (ref 27–33)
MCHC RBC AUTO-ENTMCNC: 32.2 G/DL (ref 33.7–35.3)
MCV RBC AUTO: 94.6 FL (ref 81.4–97.8)
METAMYELOCYTES NFR BLD MANUAL: 0.9 %
METHADONE UR QL SCN: NEGATIVE
MONOCYTES # BLD AUTO: 0.99 K/UL (ref 0–0.85)
MONOCYTES NFR BLD AUTO: 7.8 % (ref 0–13.4)
MORPHOLOGY BLD-IMP: NORMAL
NEUTROPHILS # BLD AUTO: 10.82 K/UL (ref 1.82–7.42)
NEUTROPHILS NFR BLD: 84.3 % (ref 44–72)
NEUTS BAND NFR BLD MANUAL: 0.9 % (ref 0–10)
NRBC # BLD AUTO: 0 K/UL
NRBC BLD-RTO: 0 /100 WBC
OPIATES UR QL SCN: POSITIVE
OXYCODONE UR QL SCN: POSITIVE
PCP UR QL SCN: NEGATIVE
PLATELET # BLD AUTO: 225 K/UL (ref 164–446)
PLATELET BLD QL SMEAR: NORMAL
PMV BLD AUTO: 9.7 FL (ref 9–12.9)
POIKILOCYTOSIS BLD QL SMEAR: NORMAL
POTASSIUM SERPL-SCNC: 4.1 MMOL/L (ref 3.6–5.5)
PROPOXYPH UR QL SCN: NEGATIVE
PROT SERPL-MCNC: 6.3 G/DL (ref 6–8.2)
RBC # BLD AUTO: 3.54 M/UL (ref 4.7–6.1)
RBC BLD AUTO: PRESENT
SODIUM SERPL-SCNC: 134 MMOL/L (ref 135–145)
WBC # BLD AUTO: 12.7 K/UL (ref 4.8–10.8)

## 2020-04-17 PROCEDURE — 700111 HCHG RX REV CODE 636 W/ 250 OVERRIDE (IP): Performed by: FAMILY MEDICINE

## 2020-04-17 PROCEDURE — 80053 COMPREHEN METABOLIC PANEL: CPT

## 2020-04-17 PROCEDURE — 99232 SBSQ HOSP IP/OBS MODERATE 35: CPT | Performed by: FAMILY MEDICINE

## 2020-04-17 PROCEDURE — 36415 COLL VENOUS BLD VENIPUNCTURE: CPT

## 2020-04-17 PROCEDURE — 85027 COMPLETE CBC AUTOMATED: CPT

## 2020-04-17 PROCEDURE — 80307 DRUG TEST PRSMV CHEM ANLYZR: CPT

## 2020-04-17 PROCEDURE — 700102 HCHG RX REV CODE 250 W/ 637 OVERRIDE(OP): Performed by: FAMILY MEDICINE

## 2020-04-17 PROCEDURE — 700105 HCHG RX REV CODE 258: Performed by: FAMILY MEDICINE

## 2020-04-17 PROCEDURE — A9270 NON-COVERED ITEM OR SERVICE: HCPCS | Performed by: FAMILY MEDICINE

## 2020-04-17 PROCEDURE — 85007 BL SMEAR W/DIFF WBC COUNT: CPT

## 2020-04-17 PROCEDURE — 770021 HCHG ROOM/CARE - ISO PRIVATE

## 2020-04-17 RX ADMIN — FERROUS GLUCONATE 324 MG: 324 TABLET ORAL at 08:42

## 2020-04-17 RX ADMIN — OXYCODONE 10 MG: 5 TABLET ORAL at 16:45

## 2020-04-17 RX ADMIN — OXYCODONE 10 MG: 5 TABLET ORAL at 21:05

## 2020-04-17 RX ADMIN — OXYCODONE 10 MG: 5 TABLET ORAL at 04:36

## 2020-04-17 RX ADMIN — OXYCODONE 10 MG: 5 TABLET ORAL at 08:42

## 2020-04-17 RX ADMIN — ACETAMINOPHEN 650 MG: 325 TABLET, FILM COATED ORAL at 17:36

## 2020-04-17 RX ADMIN — PIPERACILLIN AND TAZOBACTAM 3.38 G: 3; .375 INJECTION, POWDER, LYOPHILIZED, FOR SOLUTION INTRAVENOUS; PARENTERAL at 04:50

## 2020-04-17 RX ADMIN — HEPARIN SODIUM 5000 UNITS: 5000 INJECTION, SOLUTION INTRAVENOUS; SUBCUTANEOUS at 08:42

## 2020-04-17 RX ADMIN — SERTRALINE HYDROCHLORIDE 100 MG: 100 TABLET ORAL at 04:53

## 2020-04-17 RX ADMIN — HEPARIN SODIUM 5000 UNITS: 5000 INJECTION, SOLUTION INTRAVENOUS; SUBCUTANEOUS at 16:45

## 2020-04-17 RX ADMIN — LINEZOLID 600 MG: 600 TABLET, FILM COATED ORAL at 16:45

## 2020-04-17 RX ADMIN — OXYCODONE 10 MG: 5 TABLET ORAL at 12:42

## 2020-04-17 RX ADMIN — LINEZOLID 600 MG: 600 TABLET, FILM COATED ORAL at 04:53

## 2020-04-17 RX ADMIN — PIPERACILLIN AND TAZOBACTAM 3.38 G: 3; .375 INJECTION, POWDER, LYOPHILIZED, FOR SOLUTION INTRAVENOUS; PARENTERAL at 21:04

## 2020-04-17 RX ADMIN — PIPERACILLIN AND TAZOBACTAM 3.38 G: 3; .375 INJECTION, POWDER, LYOPHILIZED, FOR SOLUTION INTRAVENOUS; PARENTERAL at 12:41

## 2020-04-17 RX ADMIN — ATORVASTATIN CALCIUM 40 MG: 40 TABLET, FILM COATED ORAL at 04:53

## 2020-04-17 RX ADMIN — HEPARIN SODIUM 5000 UNITS: 5000 INJECTION, SOLUTION INTRAVENOUS; SUBCUTANEOUS at 00:17

## 2020-04-17 ASSESSMENT — ENCOUNTER SYMPTOMS
HEADACHES: 0
BLURRED VISION: 0
FEVER: 0
DOUBLE VISION: 0
CHILLS: 0
NECK PAIN: 0
HEARTBURN: 0
HEMOPTYSIS: 0
PALPITATIONS: 0
NAUSEA: 0
ORTHOPNEA: 0
PHOTOPHOBIA: 0
TINGLING: 0
SPUTUM PRODUCTION: 0
WEIGHT LOSS: 0
COUGH: 0
TREMORS: 0
VOMITING: 0
BACK PAIN: 0

## 2020-04-17 ASSESSMENT — PAIN SCALES - WONG BAKER: WONGBAKER_NUMERICALRESPONSE: HURTS JUST A LITTLE BIT

## 2020-04-17 NOTE — DISCHARGE PLANNING
Patient is eligible for Medicaid Meds to Beds at discharge if they have coverage with [place] Medicaid, Medicaid FFS, Medicaid HMO ([facility]), or [place]. This service is provided through the [facility] if orders are received by the pharmacy prior to 4pm Monday through Friday excluding holidays. Preferred pharmacy has been changed to [facility]. Please call [phone] prior to discharge.

## 2020-04-17 NOTE — PROGRESS NOTES
Assumed care of pt at 0715. Report received and bedside rounding completed with night RN. Pt is calm no SOB, or in any acute distress noted.    Pt is considered a moderate fall risk. edu provided on risk level. Fall precautions in place,  bed alarm. - Treaded non slip socks. Bed locked. Communication board updated with POC. Call light and pt belongings within reach - pt makes needs known - hourly rounding in place. See flowsheets for further assessment.     Patient c/o of some pain this am. Sitting calmly in bed- pain med given. IVF running. Wound care to see patient today. Patient still NPO- notified MD to see if he still needs to be NPO.

## 2020-04-17 NOTE — CARE PLAN
Problem: Venous Thromboembolism (VTW)/Deep Vein Thrombosis (DVT) Prevention:  Goal: Patient will participate in Venous Thrombosis (VTE)/Deep Vein Thrombosis (DVT)Prevention Measures  Outcome: PROGRESSING AS EXPECTED  Note: Pt on heparin     Problem: Pain Management  Goal: Pain level will decrease to patient's comfort goal  Outcome: PROGRESSING AS EXPECTED  Note LLE pain. Oxy given.

## 2020-04-17 NOTE — CARE PLAN
Problem: Communication  Goal: The ability to communicate needs accurately and effectively will improve  Outcome: PROGRESSING AS EXPECTED  Note: Pt educated about call light use, pt is encouraged to call for assistance when  needed. Pt is able to make needs known verbally.      Problem: Safety  Goal: Will remain free from injury  Outcome: PROGRESSING AS EXPECTED  Note: Pt will remain free from injury during stay, pt is on bed alarm, encouraged to call for assistance when wanting to ambulate/transfer.

## 2020-04-17 NOTE — PROGRESS NOTES
[facility] Medicine Daily Progress Note    Date of Service  4/17/2020    Chief Complaint  64 y.o. male admitted 4/16/2020 with Left leg redness and swelling.    Hospital Course    64 y.o. male who presented 4/16/2020 with left leg redness and swelling.  Patient has no history of venous stasis dermatitis of both legs, and venous stasis ulcers of the left leg.  Patient states that he was discharged from wound care month ago and said the ulcers had resolved.  But then a week ago he noted that ulcers came back he also noticed increasing redness and swelling and pain.  He denied having any fever or chills, cough or congestion, shortness of breath, abdominal pain, nausea or vomiting, or diarrhea.  On review of his previous wound cultures, he has history of MRSA, Pseudomonas, Serratia, and group B streptococcus.    Interval Problem Update  none    Consultants/Specialty  Vascular surgery    Code Status  full    Disposition  pending    Review of Systems  Review of Systems   Constitutional: Negative for chills, fever and weight loss.   HENT: Negative for ear discharge, ear pain and tinnitus.    Eyes: Negative for blurred vision, double vision and photophobia.   Respiratory: Negative for cough, hemoptysis and sputum production.    Cardiovascular: Negative for chest pain, palpitations and orthopnea.   Gastrointestinal: Negative for heartburn, nausea and vomiting.   Genitourinary: Negative for dysuria, frequency and urgency.   Musculoskeletal: Negative for back pain, joint pain and neck pain.   Skin: Negative for itching.   Neurological: Negative for tingling, tremors and headaches.        Physical Exam  Temp:  [36.1 °C (97 °F)-38.1 °C (100.6 °F)] 37.3 °C (99.1 °F)  Pulse:  [80-97] 97  Resp:  [14-26] 17  BP: ()/(64-87) 102/64  SpO2:  [87 %-100 %] 92 %    Physical Exam  Constitutional:       Appearance: Normal appearance.   HENT:      Head: Normocephalic and atraumatic.      Nose: Nose normal.      Mouth/Throat:      Mouth:  Mucous membranes are dry.   Eyes:      Extraocular Movements: Extraocular movements intact.      Pupils: Pupils are equal, round, and reactive to light.   Neck:      Musculoskeletal: Normal range of motion and neck supple.   Cardiovascular:      Rate and Rhythm: Normal rate and regular rhythm.      Pulses: Normal pulses.      Heart sounds: Normal heart sounds.   Pulmonary:      Breath sounds: Rhonchi present.   Abdominal:      General: Abdomen is flat.      Palpations: Abdomen is soft.   Musculoskeletal:         General: Tenderness (left leg) present.   Skin:     General: Skin is warm.      Comments: Dressing on the left leg is noted   Neurological:      General: No focal deficit present.      Mental Status: He is alert and oriented to person, place, and time.         Fluids    Intake/Output Summary (Last 24 hours) at 4/17/2020 1004  Last data filed at 4/17/2020 0100  Gross per 24 hour   Intake 100 ml   Output 1000 ml   Net -900 ml       Laboratory  Recent Labs     04/16/20  1140 04/17/20  0023   WBC 12.9* 12.7*   RBC 3.69* 3.54*   HEMOGLOBIN 11.4* 10.8*   HEMATOCRIT 34.2* 33.5*   MCV 92.7 94.6   MCH 30.9 30.5   MCHC 33.3* 32.2*   RDW 52.3* 53.3*   PLATELETCT 215 225   MPV 9.5 9.7     Recent Labs     04/16/20  1140 04/17/20  0023   SODIUM 132* 134*   POTASSIUM 3.7 4.1   CHLORIDE 98 100   CO2 20 21   GLUCOSE 108* 137*   BUN 28* 28*   CREATININE 1.46* 1.30   CALCIUM 8.4* 8.2*                   Imaging       Assessment/Plan  * Cellulitis of left lower extremity- (present on admission)  Assessment & Plan  Zyvox   Zosyn   CT lower extremity showed:1.  Extensive subcutaneous edema with overlying skin thickening. Inflammation extends along the fascial planes with curvilinear fluid superficial to the gastrocnemius muscle. No soft tissue gas to suggest necrotizing fasciitis.  Keep NPO  Follow cultures  tigar texted  Dr barragan    Venous stasis ulcer of left lower extremity (HCC)- (present on admission)  Assessment &  Plan  Consult Wound care    Hyponatremia- (present on admission)  Assessment & Plan  IVF NS, follow bmp  mild    Venous stasis dermatitis of both lower extremities- (present on admission)  Assessment & Plan  Consult wound care    CKD (chronic kidney disease) stage 3, GFR 30-59 ml/min ([facility])- (present on admission)  Assessment & Plan  IVF hydration, follow bmp    Essential hypertension- (present on admission)  Assessment & Plan  Controlled  IV Hydralazine as needed with parameters    Methamphetamine abuse ([facility])- (present on admission)  Assessment & Plan  Positive  UDS       VTE prophylaxis: heparin

## 2020-04-17 NOTE — PROGRESS NOTES
Report received from KEN Meza. Assumed care at 1900, assessment complete. Pt is A & O x 4.  Pt c/o 10/10 LLE pain, pain meds administered, see MAR. Fall precautions and appropriate signs in place. Pt oriented to unit routine, call light/phone system and RN extension number provided. Pt educated regarding fall precautions. Bed alarm in use. Pt denies any additional needs at this time. Call light within reach. Pt is able to make needs known, pt educated about being NPO @ 0000. Pt LLE is swollen and elevated, sandra place underneath for weeping.

## 2020-04-17 NOTE — PROGRESS NOTES
Assumed care of pt at 0700. VSS, AAOx4. IV abx. Plan of care reviewed. Hourly rounding in place. Will continue to monitor.

## 2020-04-17 NOTE — RESPIRATORY CARE
COPD EDUCATION by COPD CLINICAL EDUCATOR  4/17/2020 at 7:53 AM by Natalee Hayward, RRT     Patient reviewed by COPD education team. Patient does not have a history or diagnosis of COPD and is a non-smoker, therefore does not qualify for the COPD program.

## 2020-04-18 LAB
ALBUMIN SERPL BCP-MCNC: 2.7 G/DL (ref 3.2–4.9)
ALBUMIN/GLOB SERPL: 0.6 G/DL
ALP SERPL-CCNC: 107 U/L (ref 30–99)
ALT SERPL-CCNC: 31 U/L (ref 2–50)
ANION GAP SERPL CALC-SCNC: 15 MMOL/L (ref 7–16)
AST SERPL-CCNC: 21 U/L (ref 12–45)
BACTERIA WND AEROBE CULT: ABNORMAL
BASOPHILS # BLD AUTO: 0 % (ref 0–1.8)
BASOPHILS # BLD: 0 K/UL (ref 0–0.12)
BILIRUB SERPL-MCNC: 1 MG/DL (ref 0.1–1.5)
BUN SERPL-MCNC: 33 MG/DL (ref 8–22)
BURR CELLS BLD QL SMEAR: NORMAL
CALCIUM SERPL-MCNC: 8.3 MG/DL (ref 8.5–10.5)
CHLORIDE SERPL-SCNC: 100 MMOL/L (ref 96–112)
CO2 SERPL-SCNC: 18 MMOL/L (ref 20–33)
CREAT SERPL-MCNC: 1.92 MG/DL (ref 0.5–1.4)
DOHLE BOD BLD QL SMEAR: NORMAL
EOSINOPHIL # BLD AUTO: 0 K/UL (ref 0–0.51)
EOSINOPHIL NFR BLD: 0 % (ref 0–6.9)
ERYTHROCYTE [DISTWIDTH] IN BLOOD BY AUTOMATED COUNT: 53.2 FL (ref 35.9–50)
GLOBULIN SER CALC-MCNC: 4.2 G/DL (ref 1.9–3.5)
GLUCOSE SERPL-MCNC: 125 MG/DL (ref 65–99)
GRAM STN SPEC: ABNORMAL
HCT VFR BLD AUTO: 33.4 % (ref 42–52)
HGB BLD-MCNC: 10.8 G/DL (ref 14–18)
LG PLATELETS BLD QL SMEAR: NORMAL
LYMPHOCYTES # BLD AUTO: 0.86 K/UL (ref 1–4.8)
LYMPHOCYTES NFR BLD: 5.3 % (ref 22–41)
MANUAL DIFF BLD: ABNORMAL
MCH RBC QN AUTO: 30.8 PG (ref 27–33)
MCHC RBC AUTO-ENTMCNC: 32.3 G/DL (ref 33.7–35.3)
MCV RBC AUTO: 95.2 FL (ref 81.4–97.8)
MONOCYTES # BLD AUTO: 0 K/UL (ref 0–0.85)
MONOCYTES NFR BLD AUTO: 0 % (ref 0–13.4)
MORPHOLOGY BLD-IMP: NORMAL
NEUTROPHILS # BLD AUTO: 15.34 K/UL (ref 1.82–7.42)
NEUTROPHILS NFR BLD: 84.2 % (ref 44–72)
NEUTS BAND NFR BLD MANUAL: 10.5 % (ref 0–10)
NRBC # BLD AUTO: 0 K/UL
NRBC BLD-RTO: 0 /100 WBC
PLATELET # BLD AUTO: 266 K/UL (ref 164–446)
PLATELET BLD QL SMEAR: NORMAL
PMV BLD AUTO: 9.6 FL (ref 9–12.9)
POIKILOCYTOSIS BLD QL SMEAR: NORMAL
POTASSIUM SERPL-SCNC: 4.4 MMOL/L (ref 3.6–5.5)
PROT SERPL-MCNC: 6.9 G/DL (ref 6–8.2)
RBC # BLD AUTO: 3.51 M/UL (ref 4.7–6.1)
RBC BLD AUTO: PRESENT
SIGNIFICANT IND 70042: ABNORMAL
SITE SITE: ABNORMAL
SODIUM SERPL-SCNC: 133 MMOL/L (ref 135–145)
SOURCE SOURCE: ABNORMAL
TOXIC GRANULES BLD QL SMEAR: SLIGHT
WBC # BLD AUTO: 16.2 K/UL (ref 4.8–10.8)

## 2020-04-18 PROCEDURE — 36415 COLL VENOUS BLD VENIPUNCTURE: CPT

## 2020-04-18 PROCEDURE — 80053 COMPREHEN METABOLIC PANEL: CPT

## 2020-04-18 PROCEDURE — A9270 NON-COVERED ITEM OR SERVICE: HCPCS | Performed by: FAMILY MEDICINE

## 2020-04-18 PROCEDURE — 85027 COMPLETE CBC AUTOMATED: CPT

## 2020-04-18 PROCEDURE — 770021 HCHG ROOM/CARE - ISO PRIVATE

## 2020-04-18 PROCEDURE — 700102 HCHG RX REV CODE 250 W/ 637 OVERRIDE(OP): Performed by: FAMILY MEDICINE

## 2020-04-18 PROCEDURE — 700111 HCHG RX REV CODE 636 W/ 250 OVERRIDE (IP): Performed by: FAMILY MEDICINE

## 2020-04-18 PROCEDURE — 99232 SBSQ HOSP IP/OBS MODERATE 35: CPT | Performed by: FAMILY MEDICINE

## 2020-04-18 PROCEDURE — 700101 HCHG RX REV CODE 250: Performed by: FAMILY MEDICINE

## 2020-04-18 PROCEDURE — 85007 BL SMEAR W/DIFF WBC COUNT: CPT

## 2020-04-18 PROCEDURE — 700105 HCHG RX REV CODE 258: Performed by: FAMILY MEDICINE

## 2020-04-18 RX ORDER — CLINDAMYCIN PHOSPHATE 600 MG/50ML
600 INJECTION, SOLUTION INTRAVENOUS EVERY 8 HOURS
Status: DISCONTINUED | OUTPATIENT
Start: 2020-04-18 | End: 2020-04-19

## 2020-04-18 RX ADMIN — LINEZOLID 600 MG: 600 TABLET, FILM COATED ORAL at 05:34

## 2020-04-18 RX ADMIN — CLINDAMYCIN IN 5 PERCENT DEXTROSE 600 MG: 12 INJECTION, SOLUTION INTRAVENOUS at 22:13

## 2020-04-18 RX ADMIN — SERTRALINE HYDROCHLORIDE 100 MG: 100 TABLET ORAL at 05:34

## 2020-04-18 RX ADMIN — HEPARIN SODIUM 5000 UNITS: 5000 INJECTION, SOLUTION INTRAVENOUS; SUBCUTANEOUS at 02:24

## 2020-04-18 RX ADMIN — PIPERACILLIN AND TAZOBACTAM 3.38 G: 3; .375 INJECTION, POWDER, LYOPHILIZED, FOR SOLUTION INTRAVENOUS; PARENTERAL at 05:34

## 2020-04-18 RX ADMIN — FERROUS GLUCONATE 324 MG: 324 TABLET ORAL at 09:24

## 2020-04-18 RX ADMIN — OXYCODONE 10 MG: 5 TABLET ORAL at 19:42

## 2020-04-18 RX ADMIN — HEPARIN SODIUM 5000 UNITS: 5000 INJECTION, SOLUTION INTRAVENOUS; SUBCUTANEOUS at 14:56

## 2020-04-18 RX ADMIN — SODIUM CHLORIDE: 9 INJECTION, SOLUTION INTRAVENOUS at 15:01

## 2020-04-18 RX ADMIN — OXYCODONE 10 MG: 5 TABLET ORAL at 10:43

## 2020-04-18 RX ADMIN — CLINDAMYCIN IN 5 PERCENT DEXTROSE 600 MG: 12 INJECTION, SOLUTION INTRAVENOUS at 14:56

## 2020-04-18 RX ADMIN — ATORVASTATIN CALCIUM 40 MG: 40 TABLET, FILM COATED ORAL at 05:34

## 2020-04-18 RX ADMIN — OXYCODONE 10 MG: 5 TABLET ORAL at 06:09

## 2020-04-18 RX ADMIN — HEPARIN SODIUM 5000 UNITS: 5000 INJECTION, SOLUTION INTRAVENOUS; SUBCUTANEOUS at 09:24

## 2020-04-18 ASSESSMENT — ENCOUNTER SYMPTOMS
BACK PAIN: 0
SPUTUM PRODUCTION: 0
HEADACHES: 0
NECK PAIN: 0
ORTHOPNEA: 0
HEMOPTYSIS: 0
TINGLING: 0
PALPITATIONS: 0
PHOTOPHOBIA: 0
ABDOMINAL PAIN: 0
CHILLS: 0
EYE PAIN: 0
VOMITING: 0
SHORTNESS OF BREATH: 0
DIZZINESS: 0
NAUSEA: 0
WEIGHT LOSS: 0
DOUBLE VISION: 0

## 2020-04-18 ASSESSMENT — FIBROSIS 4 INDEX: FIB4 SCORE: 0.91

## 2020-04-18 NOTE — WOUND TEAM
[facility]  Inpatient Services  Initial Wound and Skin Care Evaluation    Admission Date: 4/16/2020     Last order of IP CONSULT TO WOUND CARE was found on 4/17/2020 from Hospital Encounter on 4/16/2020       HPI, PMH, SH: Reviewed    Unit where seen by Wound Team: [id number]/[id number]     WOUND CONSULT RELATED TO: LLE    Self Report / Pain Level:  No c/o pain       OBJECTIVE:  Pt lying in bed with Kerlix dressing in place, leg elevated on pillow.      WOUND TYPE, LOCATION, CHARACTERISTICS (Pressure Injuries: location, stage, POA or date identified)      Wound 04/16/20 Venous Ulcer Leg Anterior Left -Left Anterior LE (Active)   Wound Image   4/17/2020   Site Assessment Pink;Red;Eschar;Non-healing;Slough    Periwound Assessment Pink    Margins Attached edges;Defined edges    Closure Secondary intention    Drainage Amount Moderate    Drainage Description Purulent;Serosanguineous    Treatments Cleansed;Site care    Wound Cleansing Approved Wound Cleanser    Periwound Protectant Not Applicable    Dressing Cleansing/Solutions Not Applicable    Dressing Options Unna Boot;Absorbent Abdominal Pad;Dry Roll Gauze;Tape    Dressing Changed New    Dressing Status Clean;Dry;Intact    Dressing Change/Treatment Frequency Every 48 hrs, and As Needed    NEXT Dressing Change/Treatment Date 04/19/20    NEXT Weekly Photo (Inpatient Only) 04/24/20    Non-staged Wound Description Full thickness    Wound Bed Slough (%) 50 %    Wound Bed Eschar (%) 50 %    Shape Irregular/ Scattered    Wound Odor Mild    Pulses 1+;DP    Exposed Structures None    Number of days: 1     Vascular:    BHUPINDER:   No results found.      Lab Values:    Lab Results   Component Value Date/Time    WBC 12.7 (H) 04/17/2020 12:23 AM    RBC 3.54 (L) 04/17/2020 12:23 AM    HEMOGLOBIN 10.8 (L) 04/17/2020 12:23 AM    HEMATOCRIT 33.5 (L) 04/17/2020 12:23 AM    CREACTPROT 31.70 (H) 04/16/2020 11:40 AM    SEDRATEWES 97 (H) 04/16/2020 11:40 AM    HBA1C 5.7 (H) 01/23/2020 11:22  AM          Culture:   Obtained 4/16,   Culture Results show:  No results found for this or any previous visit (from the past 720 hour(s)).      INTERVENTIONS BY WOUND TEAM:  Dressings removed, wound and periwound cleansed with wound cleanser. Viscopaste wrapped on leg followed by 2 ABD pads and roll gauze.      Interdisciplinary consultation: Patient, Primary RN     EVALUATION:  Wound appear to have maceration surrounding the tissue, will encourage epithelialization and aid in drying out with viscopaste.       Goals: Steady decrease in wound area and depth weekly.    NURSING PLAN OF CARE ORDERS (X):    Dressing changes: See Dressing Care orders: X  Skin care: See Skin Care orders   Rectal tube care: See Rectal Tube Care orders:   Other orders:    RSKIN:   CURRENTLY IN PLACE (X), APPLIED THIS VISIT (A), ORDERED (O):   Q shift Vahe:    Q shift pressure point assessments:    Pressure redistribution mattress        X    Low Airloss          Bariatric LUBA         Bariatric foam           Heel float boots     Heel Silicone dressing        Float Heels off Bed with Pillows               Barrier wipes         Barrier Cream         Barrier paste          Sacral silicone dressing         Silicone O2 tubing         Anchorfast         Cannula fixation Device (Tender )          Gray Foam Ear protectors           Trach with Optifoam split foam                 Waffle cushion        Waffle Overlay         Rectal tube or BMS    Purwick/Condom Cath          Antifungal tx      Interdry          Reposition q 2 hours        Up to chair        Ambulate      PT/OT        Dietician        Diabetes Education      PO     TF     TPN     NPO   # days   Other        WOUND TEAM PLAN OF CARE    Dressing changes by wound team:          Follow up 1-2 times weekly:  X               Follow up 3 times weekly:                NPWT change 3 times weekly:     Follow up as needed:       Other (explain):     Anticipated discharge plans:  LTACH:           SNF/Rehab:                  Home Care:           Outpatient Wound Center:         X   Self Care:

## 2020-04-18 NOTE — PROGRESS NOTES
Assumed care of patient at 1900. Pt laying in bed comfortably, denies pain at this time. Call bell in pts reach, bed in low and locked position. Pt denies needing anything at this time.

## 2020-04-18 NOTE — CARE PLAN
Problem: Safety  Goal: Will remain free from falls  Intervention: Implement fall precautions  Flowsheets (Taken 4/18/2020 3237)  Bed Alarm: Yes - Alarm On  Environmental Precautions:   Treaded Slipper Socks on Patient   Personal Belongings, Wastebasket, Call Bell etc. in Easy Reach   Transferred to Stronger Side   Report Given to Other Health Care Providers Regarding Fall Risk   Bed in Low Position   Communication Sign for Patients & Families   Mobility Assessed & Appropriate Sign Placed     Problem: Knowledge Deficit  Goal: Knowledge of disease process/condition, treatment plan, diagnostic tests, and medications will improve  Outcome: PROGRESSING AS EXPECTED  Intervention: Explain information regarding disease process/condition, treatment plan, diagnostic tests, and medications and document in education  Note: Pt updated on POC

## 2020-04-18 NOTE — PROGRESS NOTES
[facility] Medicine Daily Progress Note    Date of Service  4/18/2020    Chief Complaint  64 y.o. male admitted 4/16/2020 with Left leg redness and swelling.    Hospital Course    64 y.o. male who presented 4/16/2020 with left leg redness and swelling.  Patient has no history of venous stasis dermatitis of both legs, and venous stasis ulcers of the left leg.  Patient states that he was discharged from wound care month ago and said the ulcers had resolved.  But then a week ago he noted that ulcers came back he also noticed increasing redness and swelling and pain.  He denied having any fever or chills, cough or congestion, shortness of breath, abdominal pain, nausea or vomiting, or diarrhea.  On review of his previous wound cultures, he has history of MRSA, Pseudomonas, Serratia, and group B streptococcus.    Interval Problem Update  none    Consultants/Specialty  Vascular surgery    Code Status  full    Disposition  pending    Review of Systems  Review of Systems   Constitutional: Negative for chills, malaise/fatigue and weight loss.   HENT: Negative for ear discharge, ear pain and nosebleeds.    Eyes: Negative for double vision, photophobia and pain.   Respiratory: Negative for hemoptysis, sputum production and shortness of breath.    Cardiovascular: Negative for chest pain, palpitations and orthopnea.   Gastrointestinal: Negative for abdominal pain, nausea and vomiting.   Genitourinary: Negative for frequency, hematuria and urgency.   Musculoskeletal: Negative for back pain, joint pain and neck pain.   Skin: Negative for itching.   Neurological: Negative for dizziness, tingling and headaches.        Physical Exam  Temp:  [36.4 °C (97.6 °F)-38.1 °C (100.6 °F)] 37.3 °C (99.2 °F)  Pulse:  [] 103  Resp:  [16-19] 19  BP: ()/(63-74) 105/65  SpO2:  [91 %-95 %] 91 %    Physical Exam  Constitutional:       General: He is not in acute distress.     Appearance: He is not toxic-appearing.   HENT:      Head:  Normocephalic and atraumatic.      Nose: No congestion or rhinorrhea.      Mouth/Throat:      Mouth: Mucous membranes are dry.   Eyes:      Conjunctiva/sclera: Conjunctivae normal.   Neck:      Musculoskeletal: No neck rigidity or muscular tenderness.   Cardiovascular:      Rate and Rhythm: Normal rate and regular rhythm.      Heart sounds: No murmur. No friction rub.   Pulmonary:      Breath sounds: No stridor. No rhonchi.   Abdominal:      General: Bowel sounds are normal.   Musculoskeletal:         General: Tenderness (left leg) present.   Skin:     Coloration: Skin is not jaundiced or pale.      Comments: Dressing on the left leg is noted   Neurological:      General: No focal deficit present.      Mental Status: Mental status is at baseline.         Fluids  No intake or output data in the 24 hours ending 04/18/20 1101    Laboratory  Recent Labs     04/16/20  1140 04/17/20  0023 04/18/20  0040   WBC 12.9* 12.7* 16.2*   RBC 3.69* 3.54* 3.51*   HEMOGLOBIN 11.4* 10.8* 10.8*   HEMATOCRIT 34.2* 33.5* 33.4*   MCV 92.7 94.6 95.2   MCH 30.9 30.5 30.8   MCHC 33.3* 32.2* 32.3*   RDW 52.3* 53.3* 53.2*   PLATELETCT 215 225 266   MPV 9.5 9.7 9.6     Recent Labs     04/16/20  1140 04/17/20  0023 04/18/20  0040   SODIUM 132* 134* 133*   POTASSIUM 3.7 4.1 4.4   CHLORIDE 98 100 100   CO2 20 21 18*   GLUCOSE 108* 137* 125*   BUN 28* 28* 33*   CREATININE 1.46* 1.30 1.92*   CALCIUM 8.4* 8.2* 8.3*                   Imaging       Assessment/Plan  * Cellulitis of left lower extremity- (present on admission)  Assessment & Plan  dced Zyvox   dced Zosyn  Wound culture result is noted  Started clindamycin   CT lower extremity showed:1.  Extensive subcutaneous edema with overlying skin thickening. Inflammation extends along the fascial planes with curvilinear fluid superficial to the gastrocnemius muscle. No soft tissue gas to suggest necrotizing fasciitis.    Wound care input is noted  tigar texted  Dr barragan    Venous stasis ulcer of  left lower extremity (HCC)- (present on admission)  Assessment & Plan  Consult Wound care    Hyponatremia- (present on admission)  Assessment & Plan  IVF NS, follow bmp  mild    Venous stasis dermatitis of both lower extremities- (present on admission)  Assessment & Plan  Consult wound care    CKD (chronic kidney disease) stage 3, GFR 30-59 ml/min (HCC)- (present on admission)  Assessment & Plan  IVF hydration, follow bmp    Essential hypertension- (present on admission)  Assessment & Plan  Controlled  IV Hydralazine as needed with parameters    Methamphetamine abuse (HCC)- (present on admission)  Assessment & Plan  Positive  UDS       VTE prophylaxis: heparin

## 2020-04-18 NOTE — CARE PLAN
Problem: Safety  Goal: Will remain free from injury  Note: Patient educated to use call bell when needing help, call bell in pts reach,  Goal: Will remain free from falls  Outcome: PROGRESSING AS EXPECTED     Problem: Pain Management  Goal: Pain level will decrease to patient's comfort goal  Outcome: PROGRESSING AS EXPECTED  Note: Pt communicates pain level effectively, pain meds prescribed and given when needed

## 2020-04-19 ENCOUNTER — APPOINTMENT (OUTPATIENT)
Dept: RADIOLOGY | Facility: MEDICAL CENTER | Age: 65
DRG: 571 | End: 2020-04-19
Attending: FAMILY MEDICINE
Payer: MEDICAID

## 2020-04-19 LAB
ALBUMIN SERPL BCP-MCNC: 3 G/DL (ref 3.2–4.9)
ALBUMIN/GLOB SERPL: 0.7 G/DL
ALP SERPL-CCNC: 116 U/L (ref 30–99)
ALT SERPL-CCNC: 29 U/L (ref 2–50)
ANION GAP SERPL CALC-SCNC: 16 MMOL/L (ref 7–16)
ANISOCYTOSIS BLD QL SMEAR: ABNORMAL
AST SERPL-CCNC: 21 U/L (ref 12–45)
BASOPHILS # BLD AUTO: 0 % (ref 0–1.8)
BASOPHILS # BLD: 0 K/UL (ref 0–0.12)
BILIRUB SERPL-MCNC: 0.7 MG/DL (ref 0.1–1.5)
BUN SERPL-MCNC: 42 MG/DL (ref 8–22)
BURR CELLS BLD QL SMEAR: NORMAL
CALCIUM SERPL-MCNC: 8.2 MG/DL (ref 8.5–10.5)
CHLORIDE SERPL-SCNC: 99 MMOL/L (ref 96–112)
CO2 SERPL-SCNC: 19 MMOL/L (ref 20–33)
CREAT SERPL-MCNC: 2.39 MG/DL (ref 0.5–1.4)
EOSINOPHIL # BLD AUTO: 0.11 K/UL (ref 0–0.51)
EOSINOPHIL NFR BLD: 0.9 % (ref 0–6.9)
ERYTHROCYTE [DISTWIDTH] IN BLOOD BY AUTOMATED COUNT: 55.5 FL (ref 35.9–50)
GLOBULIN SER CALC-MCNC: 4.5 G/DL (ref 1.9–3.5)
GLUCOSE SERPL-MCNC: 108 MG/DL (ref 65–99)
HCT VFR BLD AUTO: 34 % (ref 42–52)
HGB BLD-MCNC: 10.9 G/DL (ref 14–18)
LG PLATELETS BLD QL SMEAR: NORMAL
LYMPHOCYTES # BLD AUTO: 0.64 K/UL (ref 1–4.8)
LYMPHOCYTES NFR BLD: 5.2 % (ref 22–41)
MACROCYTES BLD QL SMEAR: ABNORMAL
MANUAL DIFF BLD: NORMAL
MCH RBC QN AUTO: 31.1 PG (ref 27–33)
MCHC RBC AUTO-ENTMCNC: 32.1 G/DL (ref 33.7–35.3)
MCV RBC AUTO: 96.9 FL (ref 81.4–97.8)
MONOCYTES # BLD AUTO: 0.21 K/UL (ref 0–0.85)
MONOCYTES NFR BLD AUTO: 1.7 % (ref 0–13.4)
MORPHOLOGY BLD-IMP: NORMAL
NEUTROPHILS # BLD AUTO: 11.43 K/UL (ref 1.82–7.42)
NEUTROPHILS NFR BLD: 92.2 % (ref 44–72)
NRBC # BLD AUTO: 0 K/UL
NRBC BLD-RTO: 0 /100 WBC
OVALOCYTES BLD QL SMEAR: NORMAL
PLATELET # BLD AUTO: 328 K/UL (ref 164–446)
PLATELET BLD QL SMEAR: NORMAL
PMV BLD AUTO: 9.6 FL (ref 9–12.9)
POIKILOCYTOSIS BLD QL SMEAR: NORMAL
POTASSIUM SERPL-SCNC: 4.4 MMOL/L (ref 3.6–5.5)
PROT SERPL-MCNC: 7.5 G/DL (ref 6–8.2)
RBC # BLD AUTO: 3.51 M/UL (ref 4.7–6.1)
RBC BLD AUTO: PRESENT
SODIUM SERPL-SCNC: 134 MMOL/L (ref 135–145)
WBC # BLD AUTO: 12.4 K/UL (ref 4.8–10.8)

## 2020-04-19 PROCEDURE — 76775 US EXAM ABDO BACK WALL LIM: CPT

## 2020-04-19 PROCEDURE — 85007 BL SMEAR W/DIFF WBC COUNT: CPT

## 2020-04-19 PROCEDURE — 700105 HCHG RX REV CODE 258: Performed by: FAMILY MEDICINE

## 2020-04-19 PROCEDURE — 99232 SBSQ HOSP IP/OBS MODERATE 35: CPT | Performed by: FAMILY MEDICINE

## 2020-04-19 PROCEDURE — 700111 HCHG RX REV CODE 636 W/ 250 OVERRIDE (IP): Performed by: FAMILY MEDICINE

## 2020-04-19 PROCEDURE — 700102 HCHG RX REV CODE 250 W/ 637 OVERRIDE(OP): Performed by: FAMILY MEDICINE

## 2020-04-19 PROCEDURE — 80053 COMPREHEN METABOLIC PANEL: CPT

## 2020-04-19 PROCEDURE — 99255 IP/OBS CONSLTJ NEW/EST HI 80: CPT | Performed by: INTERNAL MEDICINE

## 2020-04-19 PROCEDURE — 36415 COLL VENOUS BLD VENIPUNCTURE: CPT

## 2020-04-19 PROCEDURE — 99222 1ST HOSP IP/OBS MODERATE 55: CPT | Performed by: NURSE PRACTITIONER

## 2020-04-19 PROCEDURE — 700101 HCHG RX REV CODE 250: Performed by: FAMILY MEDICINE

## 2020-04-19 PROCEDURE — 85027 COMPLETE CBC AUTOMATED: CPT

## 2020-04-19 PROCEDURE — 770021 HCHG ROOM/CARE - ISO PRIVATE

## 2020-04-19 PROCEDURE — A9270 NON-COVERED ITEM OR SERVICE: HCPCS | Performed by: FAMILY MEDICINE

## 2020-04-19 RX ORDER — SODIUM CHLORIDE 9 MG/ML
INJECTION, SOLUTION INTRAVENOUS CONTINUOUS
Status: DISCONTINUED | OUTPATIENT
Start: 2020-04-19 | End: 2020-04-20

## 2020-04-19 RX ORDER — LINEZOLID 2 MG/ML
600 INJECTION, SOLUTION INTRAVENOUS EVERY 12 HOURS
Status: DISCONTINUED | OUTPATIENT
Start: 2020-04-19 | End: 2020-04-20

## 2020-04-19 RX ADMIN — HEPARIN SODIUM 5000 UNITS: 5000 INJECTION, SOLUTION INTRAVENOUS; SUBCUTANEOUS at 01:12

## 2020-04-19 RX ADMIN — HEPARIN SODIUM 5000 UNITS: 5000 INJECTION, SOLUTION INTRAVENOUS; SUBCUTANEOUS at 08:08

## 2020-04-19 RX ADMIN — SERTRALINE HYDROCHLORIDE 100 MG: 100 TABLET ORAL at 05:08

## 2020-04-19 RX ADMIN — HEPARIN SODIUM 5000 UNITS: 5000 INJECTION, SOLUTION INTRAVENOUS; SUBCUTANEOUS at 17:58

## 2020-04-19 RX ADMIN — CLINDAMYCIN IN 5 PERCENT DEXTROSE 600 MG: 12 INJECTION, SOLUTION INTRAVENOUS at 05:08

## 2020-04-19 RX ADMIN — LINEZOLID 600 MG: 600 INJECTION, SOLUTION INTRAVENOUS at 17:58

## 2020-04-19 RX ADMIN — OXYCODONE 10 MG: 5 TABLET ORAL at 18:02

## 2020-04-19 RX ADMIN — OXYCODONE 10 MG: 5 TABLET ORAL at 08:12

## 2020-04-19 RX ADMIN — SODIUM CHLORIDE: 9 INJECTION, SOLUTION INTRAVENOUS at 01:51

## 2020-04-19 RX ADMIN — ATORVASTATIN CALCIUM 40 MG: 40 TABLET, FILM COATED ORAL at 05:08

## 2020-04-19 RX ADMIN — FERROUS GLUCONATE 324 MG: 324 TABLET ORAL at 08:08

## 2020-04-19 RX ADMIN — LINEZOLID 600 MG: 600 INJECTION, SOLUTION INTRAVENOUS at 13:25

## 2020-04-19 RX ADMIN — OXYCODONE 10 MG: 5 TABLET ORAL at 01:50

## 2020-04-19 RX ADMIN — SODIUM CHLORIDE: 9 INJECTION, SOLUTION INTRAVENOUS at 11:17

## 2020-04-19 ASSESSMENT — ENCOUNTER SYMPTOMS
PHOTOPHOBIA: 0
SHORTNESS OF BREATH: 0
MYALGIAS: 1
BLURRED VISION: 0
ABDOMINAL PAIN: 0
CHILLS: 0
NAUSEA: 0
HEMOPTYSIS: 0
VOMITING: 0
ORTHOPNEA: 0
TREMORS: 0
FEVER: 0
CLAUDICATION: 0
PALPITATIONS: 0
COUGH: 0
HEARTBURN: 0
FEVER: 1
DIZZINESS: 0
BACK PAIN: 0
HEADACHES: 0
DIARRHEA: 0
DOUBLE VISION: 0
TINGLING: 0
NECK PAIN: 0
SPUTUM PRODUCTION: 0
WEIGHT LOSS: 0
MYALGIAS: 0

## 2020-04-19 NOTE — PROGRESS NOTES
Assumed care of patient at 1330. Report received from KEN Shaikh. Agree to prior assessment. Call bell within reach, able to make needs known. Will continue to monitor.

## 2020-04-19 NOTE — PROGRESS NOTES
[id number]: Aox4. Pt noncompliant with instructions and impulsive. VSS on RA. C/o left leg pain, oxycodone PO given with good effect. Continent b/b, up to the toilet. Skin per flowsheet. Safety maintained, bed alarm set. WCTM.

## 2020-04-19 NOTE — CONSULTS
LIMB PRESERVATION SERVICE CONSULT      REFERRED BY: Santos Krishnamurthy M.D.    DATE OF CONSULTATION: 4/19/2020    REASON FOR CONSULT: left venous stasis ulcers     HISTORY OF PRESENT ILLNESS: Goran Chavez is a 64 y.o.  with a past medical history that includes hypertension, hepatitis C, kidney disease, polysubstance abuse, nondiabetic Charcot's deformity left foot, and chronic venous stasis ulcers bilateral lower extremities admitted 4/16/2020 for Cellulitis of left lower extremity.   LPS has been consulted for evaluation of left venostasis ulcers.  The patient has a history of chronic venous stasis ulcers and has been seen in outpatient wound clinic for treatment as well as infectious disease in October 2019 for an infected skin ulcer.  He reports that his previous venostasis ulcers had resolved approximately 1 month ago and he was discharged from the outpatient wound clinic.  He states that 1 week ago (4/12/2020) he was in the shower and noticed that he developed an ulcer on the anterior aspect of his lower leg distal to his knee and then the ulcer had continued to worsen from there radiating to the back of his left lower leg and development of new ulcers down his left lower leg to his ankle.  He reports having fever, chills, and a large amount of clear drainage from his ulcers.  He states that 4/15/2020 his left lower leg began to swell and developed redness.  He was seen in the outpatient wound center where he was found to have significant swelling and erythema to his left lower extremity, weeping from superficial tissue, and foul-smelling odor.  Is given the option for direct admission or to go directly to the emergency department.  He elected to go directly to the emergency department at that time.  In the emergency department, ESR 97, CRP 31.7.  He was started on vancomycin and Zosyn IV and admitted under hospital services.    IV antibiotics were started on this admission-patient was given IV  vancomycin and Zosyn in the emergency department.  Infectious diseases has been consulted.    Xray was not completed to assess for osteomyelitis.  Ortho has not been consulted yet.    Patient is not diabetic last hemoglobin A1c 1/23/2020 5.7.  The patient does have numbness to her feet.  Reports he checks his feet when he takes a shower which is daily or every other day.  He reports that he received  orthotics from [facility] that are less than 6 months old.  However, he does not wear them very much because the insert digs into the bottom of his foot.  Denies previous foot surgeries.  He has a Charcot foot deformity to his left foot but has not had reconstructive surgery.  He states that he was given referral to [facility], however his insurance does not provide coverage there.  He was given a referral to an orthopedist in [place], [place] that insurance will accept.  He states that he has not made appointment with orthopedist yet due to COVID-19 concerns at this time.  He is unemployed.      Smoking:   reports that he quit smoking about 16 months ago. His smoking use included cigarettes. He smoked 0.33 packs per day for 48.00 years. He has never used smokeless tobacco.    Alcohol:   reports no history of alcohol use.    Drug:   reports current drug use. Drugs: Marijuana, Inhaled, and Methamphetamines.      PAST MEDICAL HISTORY:   Past Medical History:   Diagnosis Date   • Anxiety    • Charcot's joint of left foot, non-diabetic 3/21/2016   • Hepatitis C, chronic (HCC)    • Hypertension    • Kidney disease    • Migraine    • Polysubstance abuse ([facility]) 3/8/2018   • Tobacco use 4/18/2016   • Ulcer of left lower extremity with necrosis of muscle (HCC) 3/21/2016   • Venous stasis ulcer ([facility]) 2017    bilateral lower extremity        PAST SURGICAL HISTORY:   Past Surgical History:   Procedure Laterality Date   • TIBIA ORIF Right 1997   • SHOULDER ORIF Left 1997   • HAND SURGERY Left     due to infection   • PB DRAIN SKIN ABSCESS  SIMPLE Left     leg, near the knee, remote       MEDICATIONS:   Current Facility-Administered Medications   Medication Dose   • NS infusion     • Linezolid (ZYVOX) premix 600 mg  600 mg   • amitriptyline (ELAVIL) tablet 25 mg  25 mg   • atorvastatin (LIPITOR) tablet 40 mg  40 mg   • ferrous gluconate (FERGON) tablet 324 mg  324 mg   • sertraline (ZOLOFT) tablet 100 mg  100 mg   • acetaminophen (TYLENOL) tablet 650 mg  650 mg   • ondansetron (ZOFRAN) syringe/vial injection 4 mg  4 mg   • ondansetron (ZOFRAN ODT) dispertab 4 mg  4 mg   • promethazine (PHENERGAN) tablet 12.5-25 mg  12.5-25 mg   • promethazine (PHENERGAN) suppository 12.5-25 mg  12.5-25 mg   • prochlorperazine (COMPAZINE) injection 5-10 mg  5-10 mg   • heparin injection 5,000 Units  5,000 Units   • hydrALAZINE (APRESOLINE) injection 10 mg  10 mg   • oxyCODONE immediate-release (ROXICODONE) tablet 5-10 mg  5-10 mg   • morphine (pf) 4 MG/ML injection 2-4 mg  2-4 mg       ALLERGIES:    Allergies   Allergen Reactions   • Norco [Hydrocodone-Acetaminophen] Itching        FAMILY HISTORY:   Family History   Problem Relation Age of Onset   • Lung Disease Mother 70         from COPD   • Hypertension Mother    • Other Father 82         from brain aneurysm after fall   • Cancer Neg Hx    • Heart Disease Neg Hx    • Stroke Neg Hx    • Diabetes Neg Hx          REVIEW OF SYSTEMS:   Constitutional: Positive for chills, fever   Respiratory: Negative for cough and shortness of breath.    Cardiovascular:Negative for chest pain, and claudication.   Gastrointestinal: Negative for constipation, diarrhea, nausea and vomiting.   Lower extremities: positive for ulcers, swelling, redness, and drainage to left lower extremity  Neurological: positive for numbness to feet and lower legs  All other systems reviewed and are negative     RESULTS:     Recent Labs     20  0023 20  0040 20  0055   WBC 12.7* 16.2* 12.4*   RBC 3.54* 3.51* 3.51*   HEMOGLOBIN 10.8*  10.8* 10.9*   HEMATOCRIT 33.5* 33.4* 34.0*   MCV 94.6 95.2 96.9   MCH 30.5 30.8 31.1   MCHC 32.2* 32.3* 32.1*   RDW 53.3* 53.2* 55.5*   PLATELETCT 225 266 328   MPV 9.7 9.6 9.6     Recent Labs     04/17/20  0023 04/18/20  0040 04/19/20  0055   SODIUM 134* 133* 134*   POTASSIUM 4.1 4.4 4.4   CHLORIDE 100 100 99   CO2 21 18* 19*   GLUCOSE 137* 125* 108*   BUN 28* 33* 42*         ESR:     Results from last 7 days   Lab Units 04/16/20  1140   SED RATE WESTERGREN 1526 mm/hour 97*       CRP:       Results from last 7 days   Lab Units 04/16/20  1140   C REACTIVE PROTEIN 4596 mg/dL 31.70*       X-ray: None this admission    MRI: None this admission    CT: CT-extremity, lower with left 4/16/2020  IMPRESSION:     1.  Extensive subcutaneous edema with overlying skin thickening. Inflammation extends along the fascial planes with curvilinear fluid superficial to the gastrocnemius muscle. No soft tissue gas to suggest necrotizing fasciitis.     2.  Osteopenia    Arterial studies:   US-BHUPINDER single level bilateral 12/27/2019     RIGHT      Waveform            Systolic BPs (mmHg)                              117           Brachial   Triphasic                                Common Femoral   Triphasic                  153           Posterior Tibial   Triphasic                  129           Dorsalis Pedis                                            Peroneal                              1.31          BHUPINDER                                            TBI                           LEFT   Waveform        Systolic BPs (mmHg)                              101           Brachial   Triphasic                                Common Femoral   Biphasic                                 Posterior Tibial   Bi, non-                                 Dorsalis Pedis   reversed                                            Peroneal                                            BHUPINDER                                            TBI         Findings   Right.    Doppler  waveform of the common femoral artery is of high amplitude and    triphasic.    Doppler waveforms at the ankle are brisk and triphasic.    Ankle-brachial index is normal.       Left.    Could not perform ankle-brachial index due to large blisters/ulcers.   Normal toe-brachial index: 0.94   Doppler waveform of the common femoral artery is of high amplitude and    triphasic.    Biphasic waveforms seen at the ankle.     A1c:  Lab Results   Component Value Date/Time    HBA1C 5.7 (H) 01/23/2020 11:22 AM            Microbiology:  Results     Procedure Component Value Units Date/Time    CULTURE WOUND W/ GRAM STAIN [[id number]]  (Abnormal)  (Susceptibility) Collected:  04/16/20 1310    Order Status:  Completed Specimen:  Wound from Left Leg Updated:  04/18/20 0831     Significant Indicator POS     Source WND     Site LEFT LEG     Culture Result -     Gram Stain Result Rare WBCs.  Moderate Gram positive cocci.       Culture Result Methicillin Resistant Staphylococcus aureus  Moderate growth        Beta Hemolytic Streptococcus group A  Moderate growth      Narrative:       CALL  Rivas  MS5 tel. [phone],  CALLED  MS5 tel. [phone] 04/17/2020, 13:16, RB PERF. RESULTS CALLED  TO:[id number] RN    Susceptibility     Methicillin resistant staphylococcus aureus (1)     Antibiotic Interpretation Microscan Method Status    Azithromycin Resistant >4 mcg/mL SHANON Final    Clindamycin Sensitive <=0.5 mcg/mL SHANON Final    Cefazolin Resistant <=8 mcg/mL SHANON Final    Ceftaroline Sensitive <=0.5 mcg/mL SHANON Final    Daptomycin Sensitive <=1 mcg/mL SHANON Final    Ampicillin/sulbactam Resistant <=8/4 mcg/mL SHANON Final    Erythromycin Resistant >4 mcg/mL SHANON Final    Vancomycin Sensitive 1 mcg/mL SHANON Final    Oxacillin Resistant >2 mcg/mL SHANON Final    Penicillin Resistant >8 mcg/mL SHANON Final    Trimeth/Sulfa Sensitive <=0.5/9.5 mcg/mL SHANON Final    Tetracycline Sensitive <=4 mcg/mL SHANON Final                   BLOOD CULTURE [[id number]] Collected:   "04/16/20 1140    Order Status:  Completed Specimen:  Blood from Peripheral Updated:  04/17/20 0827     Significant Indicator NEG     Source BLD     Site PERIPHERAL     Culture Result No Growth  Note: Blood cultures are incubated for 5 days and  are monitored continuously.Positive blood cultures  are called to the RN and reported as soon as  they are identified.      Narrative:       Per Hospital Policy: Only change Specimen Src: to \"Line\" if  specified by physician order.  No site indicated    BLOOD CULTURE [[id number]] Collected:  04/16/20 1246    Order Status:  Completed Specimen:  Blood from Peripheral Updated:  04/17/20 0827     Significant Indicator NEG     Source BLD     Site PERIPHERAL     Culture Result No Growth  Note: Blood cultures are incubated for 5 days and  are monitored continuously.Positive blood cultures  are called to the RN and reported as soon as  they are identified.      Narrative:       Per Hospital Policy: Only change Specimen Src: to \"Line\" if  specified by physician order.  Left Forearm/Arm    GRAM STAIN [[id number]] Collected:  04/16/20 1310    Order Status:  Completed Specimen:  Wound Updated:  04/16/20 1509     Significant Indicator .     Source WND     Site LEFT LEG     Gram Stain Result Rare WBCs.  Moderate Gram positive cocci.             PHYSICAL EXAMINATION:     VITAL SIGNS: /70   Pulse 86   Temp 37.1 °C (98.8 °F) (Temporal)   Resp 18   Ht 1.829 m (6')   Wt 83.6 kg (184 lb 4.9 oz)   SpO2 90%   BMI 25.00 kg/m²       General Appearance:  Well developed, thin male in no acute distress    Lower Extremity Assessment:    Edema:   3+ edema to left lower extremity  2+ edema right lower extremity    Pulses:  R foot: Palpable DP/PT pulses, triphasic on Doppler  L foot: Unable to palpate DP/PT pulses due to edema.  Biphasic tones on Doppler    ROM dorsi/plantarflexion  Dorsiflexion resistance to bilateral ankles    Structural /mechanical changes:  Left Charcot foot " deformity  Hammertoes to left 2-5 toes    Sensory Assessment  Feet Insensate to light touch  Monofilament testing with a 10 gram force: sensation intact: decreased bilaterally  Visual Inspection: Feet without maceration, ulcers, fissures.  Pedal pulses: Unable to palpate left pedal pulses due to edema, biphasic tones via Doppler.  Right pedal pulses intact  1/10 bilaterally    Wound Assessment:  Patient has multiple venous stasis ulcers to left lower extremity.  There is yellow sloughing, large amount of serous drainage weeping from ulcers,  and surrounding erythema.  No odor noted at this time.      ASSESSMENT AND PLAN:   The patient's wounds are consistent with venous stasis ulcers with an underlying cellulitis.  Wound cultures are positive for group A strep and MRSA.  Infectious disease has been consulted and recommended antimicrobial therapy with 10-day course of linezolid.  Wound care team has been consulted and this consultation was done with [place] wound care RN at bedside.  Wound care to manage chronic venostasis ulcers with compression and with wound care therapy.  The patient's ABIs from December 2019 show adequate arterial blood flow, no vascular surgery consultation is required.  The patient has a left Charcot foot deformity and hammertoes to his left 2-5 toes.  Will notify orthopedic surgeon for consult.    Wound care:   -Wound care team managing patient's venous stasis ulcers and will place orders for nursing.    Imaging/Labs:  -None at this time    Vascular status:   -Palpable right DP/PT pulses, triphasic on Doppler  -Unable to palpate left DP/PT pulses due to edema, biphasic tones on Doppler    Surgery:   -None at this time    Antibiotics:   -Patient to start 10-day course of linezolid.  Managed by infectious disease    Weight Bearing Status:   -Weight bearing as tolerated    Offloading:   -None; patient has custom orthotics at bedside    PT/OT:   -consult not required at this time    Diabetes  Education: Patient not diabetic and does not require diabetic education at this time.  - Implications of loss of protective sensation (LOPS) discussed with patient- including increased risk for amputation.  Advised to check feet at least daily, moisturize feet, and to always wear protective foot wear.   -avoid trimming own nails. See podiatrist or certified foot and nail RN    D/W: pt, Dr. Krishnamurthy      Discharge Plan:  Patient to remain inpatient for ongoing medical care.  Wound care team to manage patient's chronic venous stasis ulcers.  Infectious disease has made recommendations for antimicrobial therapy.  Dr. John, orthopedic surgeon consulted.     Follow up: LPS to follow.    Please note that this dictation was created using voice recognition software. I have  worked with technical experts from [employer] to optimize the interface.  I have made every reasonable attempt to correct obvious errors, but there may be errors of grammar and possibly content that I did not discover before finalizing the note.

## 2020-04-19 NOTE — PROGRESS NOTES
Assumed care for patient at 1900. Sitting on side of bed, A&Ox4. Clint kat in reach, bed in low and locked position. Patient instructed to use call bell when needing assistance ambulating. Denies any needs at this time.

## 2020-04-19 NOTE — CONSULTS
INFECTIOUS DISEASES INPATIENT CONSULT NOTE     Date of Service: 4/19/2020    Consult Requested By: ALEJANDRO Krishnamurthy M.D.    Reason for Consultation: Left lower extremity cellulitis    History of Present Illness:   Goran Chavez is a 64 y.o. man with a history of polysubstance abuse with methamphetamines and cannabinoids, hepatitis C status post treatment and history of lower extremity ulcerations previously followed by wound care admitted 4/16/2020 for left lower extremity erythema, swelling and pain. Patient was last seen in the ID clinic on October 1, 2019 for an infected skin ulcer.  At that time he had been doing well and completed a course of ciprofloxacin and minocycline.  He was discharged from wound care a few months prior to admission and states that his lower extremity ulcer had completely resolved.  He was doing well until approximately 1 week prior to admission when he noted that the ulcers returned associated with increasing erythema, swelling and pain after removing his compression stockings.  He noted serous drainage and increasing pain with ambulation.  He denied having any fevers or chills.  Prior cultures in September 2019 grew E. coli, pseudomonas aeruginosa and MRSA.  Cultures in July 2019 grew pseudomonas aeruginosa, Klebsiella oxytoca and MRSA.  On presentation, he was initially afebrile with a leukocytosis of 12.9.  Patient however developed a fever of 100.6 on 4/17.  He has had persistent leukocytosis.  Wound cultures are growing group A streptococcus and MRSA.  CT scan shows extensive subcutaneous edema with inflammation extending along the fascial planes with curvilinear fluid superficial to the gastrocnemius muscle but no evidence to suggest necrotizing fasciitis.  Blood cultures have been negative.  Patient was started on linezolid and Zosyn which were both discontinued on 4/18 and then transitioned to clindamycin.  Hospital course complicated by worsening LUPE.  Renal ultrasound  otherwise unremarkable.  Infectious disease service consulted for further antibiotic recommendations and management.      Review of Systems   Constitutional: Positive for fever. Negative for chills.   Respiratory: Negative for cough and shortness of breath.    Cardiovascular: Positive for leg swelling.   Gastrointestinal: Negative for abdominal pain, diarrhea, nausea and vomiting.   Genitourinary: Negative for dysuria.   Musculoskeletal: Positive for myalgias.   Neurological: Negative for dizziness and headaches.   All other systems reviewed and are negative.      PMH:   Past Medical History:   Diagnosis Date   • Anxiety    • Charcot's joint of left foot, non-diabetic 3/21/2016   • Hepatitis C, chronic (HCC)    • Hypertension    • Kidney disease    • Migraine    • Polysubstance abuse ([facility]) 3/8/2018   • Tobacco use 2016   • Ulcer of left lower extremity with necrosis of muscle ([facility]) 3/21/2016   • Venous stasis ulcer ([facility])     bilateral lower extremity   Methamphetamine abuse    PSH:  Past Surgical History:   Procedure Laterality Date   • TIBIA ORIF Right    • SHOULDER ORIF Left    • HAND SURGERY Left     due to infection   • PB DRAIN SKIN ABSCESS SIMPLE Left     leg, near the knee, remote       FAMILY HX:  Family History   Problem Relation Age of Onset   • Lung Disease Mother 70         from COPD   • Hypertension Mother    • Other Father 82         from brain aneurysm after fall   • Cancer Neg Hx    • Heart Disease Neg Hx    • Stroke Neg Hx    • Diabetes Neg Hx        SOCIAL HX:  Social History     Socioeconomic History   • Marital status: Legally      Spouse name: Not on file   • Number of children: Not on file   • Years of education: Not on file   • Highest education level: Not on file   Occupational History   • Not on file   Social Needs   • Financial resource strain: Not hard at all   • Food insecurity     Worry: Never true     Inability: Never true   • Transportation needs      Medical: No     Non-medical: No   Tobacco Use   • Smoking status: Former Smoker     Packs/day: 0.00     Years: 48.00     Pack years: 0.00     Types: Cigarettes     Last attempt to quit: 2018     Years since quittin.3   • Smokeless tobacco: Never Used   • Tobacco comment: states he is using 7mg nicotine patch   Substance and Sexual Activity   • Alcohol use: No     Alcohol/week: 7.2 oz     Types: 12 Cans of beer per week     Comment: history of alcoholism    • Drug use: Yes     Types: Marijuana, Inhaled, Methamphetamines     Comment: MJ every day, intermittent meth use   • Sexual activity: Not on file   Lifestyle   • Physical activity     Days per week: Not on file     Minutes per session: Not on file   • Stress: Not on file   Relationships   • Social connections     Talks on phone: Not on file     Gets together: Not on file     Attends Protestant service: Not on file     Active member of club or organization: Not on file     Attends meetings of clubs or organizations: Not on file     Relationship status: Not on file   • Intimate partner violence     Fear of current or ex partner: Not on file     Emotionally abused: Not on file     Physically abused: Not on file     Forced sexual activity: Not on file   Other Topics Concern   • Not on file   Social History Narrative   • Not on file     Social History     Tobacco Use   Smoking Status Former Smoker   • Packs/day: 0.00   • Years: 48.00   • Pack years: 0.00   • Types: Cigarettes   • Last attempt to quit: 2018   • Years since quittin.3   Smokeless Tobacco Never Used   Tobacco Comment    states he is using 7mg nicotine patch     Social History     Substance and Sexual Activity   Alcohol Use No   • Alcohol/week: 7.2 oz   • Types: 12 Cans of beer per week    Comment: history of alcoholism        Allergies/Intolerances:  Allergies   Allergen Reactions   • Norco [Hydrocodone-Acetaminophen] Itching       History reviewed with the patient     Other Current  Medications:    Current Facility-Administered Medications:   •  NS infusion, , Intravenous, Continuous, ALEJANDRO Krishnamurthy M.D.  •  clindamycin (CLEOCIN) IVPB premix 600 mg, 600 mg, Intravenous, Q8HRS, ALEJANDRO Krishnamurthy M.D., Stopped at 04/19/20 0538  •  amitriptyline (ELAVIL) tablet 25 mg, 25 mg, Oral, HS PRN, Silverio Lopez M.D.  •  atorvastatin (LIPITOR) tablet 40 mg, 40 mg, Oral, DAILY, Silverio Lopez M.D., 40 mg at 04/19/20 0508  •  ferrous gluconate (FERGON) tablet 324 mg, 324 mg, Oral, QDAY with Breakfast, Silverio Lopez M.D., 324 mg at 04/19/20 0808  •  sertraline (ZOLOFT) tablet 100 mg, 100 mg, Oral, DAILY, Silverio Lopez M.D., 100 mg at 04/19/20 0508  •  acetaminophen (TYLENOL) tablet 650 mg, 650 mg, Oral, Q6HRS PRN, Silverio Lopez M.D., 650 mg at 04/17/20 1736  •  ondansetron (ZOFRAN) syringe/vial injection 4 mg, 4 mg, Intravenous, Q4HRS PRN, Silverio Lopez M.D.  •  ondansetron (ZOFRAN ODT) dispertab 4 mg, 4 mg, Oral, Q4HRS PRN, Silverio Lopez M.D.  •  promethazine (PHENERGAN) tablet 12.5-25 mg, 12.5-25 mg, Oral, Q4HRS PRN, Silverio Lopez M.D.  •  promethazine (PHENERGAN) suppository 12.5-25 mg, 12.5-25 mg, Rectal, Q4HRS PRN, Silverio Lopez M.D.  •  prochlorperazine (COMPAZINE) injection 5-10 mg, 5-10 mg, Intravenous, Q4HRS PRN, Sawyer Liwanag, M.D.  •  heparin injection 5,000 Units, 5,000 Units, Subcutaneous, Q8HRS, Silverio Lopez M.D., 5,000 Units at 04/19/20 0808  •  hydrALAZINE (APRESOLINE) injection 10 mg, 10 mg, Intravenous, Q6HRS PRN, Silverio Lopez M.D.  •  oxyCODONE immediate-release (ROXICODONE) tablet 5-10 mg, 5-10 mg, Oral, Q4HRS PRN, Silverio Lopez M.D., 10 mg at 04/19/20 0812  •  morphine (pf) 4 MG/ML injection 2-4 mg, 2-4 mg, Intravenous, Q4HRS PRN, Silverio Lopez M.D., 2 mg at 04/16/20 2004  [unfilled]    Most Recent Vital Signs:  /70   Pulse 86   Temp 37.1 °C (98.8 °F) (Temporal)   Resp 18   Ht 1.829 m (6')   Wt 83.6 kg (184 lb  4.9 oz)   SpO2 90%   BMI 25.00 kg/m²   Temp  Av.1 °C (98.7 °F)  Min: 36.1 °C (97 °F)  Max: 38.1 °C (100.6 °F)    Physical Exam:  General: well nourished, no diaphoresis, well-appearing, no acute distress  HEENT: sclera anicteric, PERRL, extraocular muscles intact, mucous membranes moist, oropharynx clear and moist, no oral lesions or exudate  Neck: supple, no lymphadenopathy  Chest: CTAB, no rales, rhonchi or wheezes, normal work of breathing.  Cardiac: regular rate and rhythm, normal S1 S2, no murmurs, rubs or gallops  Abdomen: + bowel sounds, soft, non-tender, non-distended, no hepatosplenomegaly  Extremities: Bilateral lower extremity edema.  Chronic venous stasis changes bilaterally.  Left lower extremity dressed with serous drainage through dressings.  Wound care photos reviewed with superficial slough, eschars and surrounding erythema.  Left knee surgical scar  Skin: See above  Neuro: Alert and oriented times 3, non-focal exam, speech fluent, full range of motion to bilateral upper and lower extremities  Psych: normal mood and behavior, pleasant; memory intact, normal judgement    Pertinent Lab Results:  Recent Labs     20  0023 20  0040 20  0055   WBC 12.7* 16.2* 12.4*      Recent Labs     20  1140 20  0023 20  0040 20  0055   HEMOGLOBIN 11.4* 10.8* 10.8* 10.9*   HEMATOCRIT 34.2* 33.5* 33.4* 34.0*   MCV 92.7 94.6 95.2 96.9   MCH 30.9 30.5 30.8 31.1   MACROCYTOSIS 1+  --   --  1+   ANISOCYTOSIS 1+  --   --  1+   PLATELETCT 215 225 266 328         Recent Labs     20  0023 20  0040 20  0055   SODIUM 134* 133* 134*   POTASSIUM 4.1 4.4 4.4   CHLORIDE 100 100 99   CO2 21 18* 19*   CREATININE 1.30 1.92* 2.39*        Recent Labs     20  0023 20  0040 20  0055   ALBUMIN 2.7* 2.7* 3.0*        Pertinent Micro:  Results     Procedure Component Value Units Date/Time    CULTURE WOUND W/ GRAM STAIN [[id number]]  (Abnormal)  (Susceptibility)  "Collected:  04/16/20 1310    Order Status:  Completed Specimen:  Wound from Left Leg Updated:  04/18/20 0831     Significant Indicator POS     Source WND     Site LEFT LEG     Culture Result -     Gram Stain Result Rare WBCs.  Moderate Gram positive cocci.       Culture Result Methicillin Resistant Staphylococcus aureus  Moderate growth        Beta Hemolytic Streptococcus group A  Moderate growth      Narrative:       CALL  Rivas  MS5 tel. [phone],  CALLED  MS5 tel. [phone] 04/17/2020, 13:16, RB PERF. RESULTS CALLED  TO:[id number] RN    Susceptibility     Methicillin resistant staphylococcus aureus (1)     Antibiotic Interpretation Microscan Method Status    Azithromycin Resistant >4 mcg/mL SHANON Final    Clindamycin Sensitive <=0.5 mcg/mL SHANON Final    Cefazolin Resistant <=8 mcg/mL SHANON Final    Ceftaroline Sensitive <=0.5 mcg/mL SHANON Final    Daptomycin Sensitive <=1 mcg/mL SHANON Final    Ampicillin/sulbactam Resistant <=8/4 mcg/mL SHANON Final    Erythromycin Resistant >4 mcg/mL SHANON Final    Vancomycin Sensitive 1 mcg/mL SHANON Final    Oxacillin Resistant >2 mcg/mL SHANON Final    Penicillin Resistant >8 mcg/mL SHANON Final    Trimeth/Sulfa Sensitive <=0.5/9.5 mcg/mL SHANON Final    Tetracycline Sensitive <=4 mcg/mL SHANON Final                   BLOOD CULTURE [[id number]] Collected:  04/16/20 1140    Order Status:  Completed Specimen:  Blood from Peripheral Updated:  04/17/20 0827     Significant Indicator NEG     Source BLD     Site PERIPHERAL     Culture Result No Growth  Note: Blood cultures are incubated for 5 days and  are monitored continuously.Positive blood cultures  are called to the RN and reported as soon as  they are identified.      Narrative:       Per Hospital Policy: Only change Specimen Src: to \"Line\" if  specified by physician order.  No site indicated    BLOOD CULTURE [[id number]] Collected:  04/16/20 1246    Order Status:  Completed Specimen:  Blood from Peripheral Updated:  04/17/20 0827     Significant Indicator " "NEG     Source BLD     Site PERIPHERAL     Culture Result No Growth  Note: Blood cultures are incubated for 5 days and  are monitored continuously.Positive blood cultures  are called to the RN and reported as soon as  they are identified.      Narrative:       Per Hospital Policy: Only change Specimen Src: to \"Line\" if  specified by physician order.  Left Forearm/Arm    GRAM STAIN [[id number]] Collected:  04/16/20 1310    Order Status:  Completed Specimen:  Wound Updated:  04/16/20 1509     Significant Indicator .     Source WND     Site LEFT LEG     Gram Stain Result Rare WBCs.  Moderate Gram positive cocci.          Blood Culture   Date Value Ref Range Status   12/19/2018 No growth after 5 days of incubation.  Final        Studies:  Ct-extremity, Lower With Left    Result Date: 4/16/2020 4/16/2020 1:45 PM HISTORY/REASON FOR EXAM:  Lower leg swelling/redness, cellulitis suspected. Left lower extremity edema and redness TECHNIQUE/EXAM DESCRIPTION AND NUMBER OF VIEWS:  CT scan of the LEFT lower extremity with contrast, with reconstructions. Thin helical 3 mm sections were obtained from the distal femur through the proximal tibia/fibula. Sagittal and coronal multiplanar reconstructions were generated from the axial images. A total of 100 mL of Omnipaque 350 nonionic contrast was administered  IV without complication. Up to date radiation dose reduction adjustments have been utilized to meet ALARA standards for radiation dose reduction. COMPARISON: 12/26/2019 FINDINGS: There is diffuse subcutaneous edema in the lower leg. The overlying skin is markedly thickened. There is curvilinear fluid deep to the fascia, but superficial to the gastrocnemius muscle extending from the knee joint to the ankle. Edema extends into the deeper fascial planes. No soft tissue gas is identified. No definite muscle necrosis is identified. No thrombus is identified. The Achilles tendon is intact. The bones are osteopenic. No cortical " destruction is identified.     1.  Extensive subcutaneous edema with overlying skin thickening. Inflammation extends along the fascial planes with curvilinear fluid superficial to the gastrocnemius muscle. No soft tissue gas to suggest necrotizing fasciitis. 2.  Osteopenia      IMPRESSION:   1.  Left lower extremity cellulitis   2.  MRSA infection  3.  Group A streptococcal infection  4.  Leukocytosis  5.  Acute kidney injury  6.  Polysubstance abuse with methamphetamines and cannabinoids  7.  Hepatitis C status post treatment      PLAN:   Goran Chavez is a 64 y.o. man with a history of left lower extremity nonhealing ulcers, methamphetamine use and hepatitis C status post treatment admitted for worsening left lower extremity pain, swelling and erythema.  Clinical presentation consistent with soft tissue infection.  CT scan does not reveal any abscess.  Wound cultures are positive for MRSA and group A streptococcus.  Discontinue clindamycin and transition back to Zyvox as Zyvox has excellent group A strep and MRSA coverage with antitoxin properties.  Anticipate a 10-day course of antibiotics total.  Monitor CBC.  Continue wound care.  Patient is not a candidate for outpatient or home IV antibiotics given his ongoing polysubstance abuse.  Recommend he reestablish care at the outpatient wound clinic.      Plan of care discussed with KOSTAS Krishnamurthy M.D.. Will continue to follow    Karen Hemphill M.D.      Please note that this dictation was created using voice recognition software. I have worked with technical experts from [facility] to optimize the interface.  I have made every reasonable attempt to correct obvious errors, but there may be errors of grammar and possibly content that I did not discover before finalizing the note.

## 2020-04-19 NOTE — PROGRESS NOTES
[facility] Medicine Daily Progress Note    Date of Service  4/19/2020    Chief Complaint  64 y.o. male admitted 4/16/2020 with Left leg redness and swelling.    Hospital Course    64 y.o. male who presented 4/16/2020 with left leg redness and swelling.  Patient has no history of venous stasis dermatitis of both legs, and venous stasis ulcers of the left leg.  Patient states that he was discharged from wound care month ago and said the ulcers had resolved.  But then a week ago he noted that ulcers came back he also noticed increasing redness and swelling and pain.  He denied having any fever or chills, cough or congestion, shortness of breath, abdominal pain, nausea or vomiting, or diarrhea.  On review of his previous wound cultures, he has history of MRSA, Pseudomonas, Serratia, and group B streptococcus.    Interval Problem Update  Worsening renal function    Consultants/Specialty  I.D  LPS    Code Status  full    Disposition  pending    Review of Systems  Review of Systems   Constitutional: Negative for chills, fever and weight loss.   HENT: Negative for ear discharge, ear pain and tinnitus.    Eyes: Negative for blurred vision, double vision and photophobia.   Respiratory: Negative for cough, hemoptysis and sputum production.    Cardiovascular: Negative for palpitations, orthopnea and claudication.   Gastrointestinal: Negative for heartburn, nausea and vomiting.   Genitourinary: Negative for dysuria, frequency and urgency.   Musculoskeletal: Negative for back pain, myalgias and neck pain.   Skin: Negative for itching.   Neurological: Negative for tingling, tremors and headaches.        Physical Exam  Temp:  [36.8 °C (98.2 °F)-37.7 °C (99.8 °F)] 37.1 °C (98.8 °F)  Pulse:  [83-98] 86  Resp:  [18] 18  BP: (100-127)/(70-75) 100/70  SpO2:  [90 %-100 %] 90 %    Physical Exam  Constitutional:       Appearance: He is not toxic-appearing or diaphoretic.   HENT:      Head: Normocephalic and atraumatic.      Nose: Nose  normal.      Mouth/Throat:      Mouth: Mucous membranes are dry.   Eyes:      Extraocular Movements: Extraocular movements intact.      Pupils: Pupils are equal, round, and reactive to light.   Neck:      Musculoskeletal: Normal range of motion and neck supple.   Cardiovascular:      Rate and Rhythm: Normal rate and regular rhythm.      Pulses: Normal pulses.      Heart sounds: Normal heart sounds.   Pulmonary:      Effort: No respiratory distress.      Breath sounds: No stridor.   Abdominal:      General: Abdomen is flat.      Palpations: Abdomen is soft.   Genitourinary:     Comments: Ulcer on glans penis has healed  Musculoskeletal:         General: Tenderness (left leg) present.   Skin:     General: Skin is warm and dry.      Comments: Dressing on the left leg is noted   Neurological:      General: No focal deficit present.      Mental Status: He is alert and oriented to person, place, and time.         Fluids    Intake/Output Summary (Last 24 hours) at 4/19/2020 1034  Last data filed at 4/19/2020 0400  Gross per 24 hour   Intake --   Output 400 ml   Net -400 ml       Laboratory  Recent Labs     04/17/20  0023 04/18/20  0040 04/19/20  0055   WBC 12.7* 16.2* 12.4*   RBC 3.54* 3.51* 3.51*   HEMOGLOBIN 10.8* 10.8* 10.9*   HEMATOCRIT 33.5* 33.4* 34.0*   MCV 94.6 95.2 96.9   MCH 30.5 30.8 31.1   MCHC 32.2* 32.3* 32.1*   RDW 53.3* 53.2* 55.5*   PLATELETCT 225 266 328   MPV 9.7 9.6 9.6     Recent Labs     04/17/20  0023 04/18/20  0040 04/19/20  0055   SODIUM 134* 133* 134*   POTASSIUM 4.1 4.4 4.4   CHLORIDE 100 100 99   CO2 21 18* 19*   GLUCOSE 137* 125* 108*   BUN 28* 33* 42*   CREATININE 1.30 1.92* 2.39*   CALCIUM 8.2* 8.3* 8.2*                   Imaging       Assessment/Plan  * Cellulitis of left lower extremity- (present on admission)  Assessment & Plan  Wound culture result is noted  Consulted ID  Consulted LPS   clindamycin     CT lower extremity showed:1.  Extensive subcutaneous edema with overlying skin  thickening. Inflammation extends along the fascial planes with curvilinear fluid superficial to the gastrocnemius muscle. No soft tissue gas to suggest necrotizing fasciitis.    Wound care input is noted+    Arterial ultrasound on 12/27/2019 showed:No evidence of arterial insufficiency in either lower extremity       Venous stasis ulcer of left lower extremity (HCC)- (present on admission)  Assessment & Plan  Consult Wound care  Consulted limb preservation services    Hyponatremia- (present on admission)  Assessment & Plan  IVF NS, follow bmp  mild    Venous stasis dermatitis of both lower extremities- (present on admission)  Assessment & Plan  Consult wound care    CKD (chronic kidney disease) stage 3, GFR 30-59 ml/min ([facility])- (present on admission)  Assessment & Plan  Acute on chronic  IVF hydration, follow bmp  Will get renal ultrasound    Essential hypertension- (present on admission)  Assessment & Plan  Controlled  IV Hydralazine as needed with parameters    Methamphetamine abuse ([facility])- (present on admission)  Assessment & Plan  Positive  UDS       VTE prophylaxis: heparin

## 2020-04-19 NOTE — CARE PLAN
Problem: Safety  Goal: Will remain free from falls  Note: Patient instructed to use call bell when needing to ambulate, call bell in reach, bed alarm on.     Problem: Knowledge Deficit  Goal: Knowledge of disease process/condition, treatment plan, diagnostic tests, and medications will improve  Note: Educated patient on the importance of using call bell, this is so he doesn't hurt himself, Pt states he understands

## 2020-04-19 NOTE — WOUND TEAM
[facility]  Inpatient Services  Wound and Skin Care Progress note    Admission Date: 4/16/2020     Last order of IP CONSULT TO WOUND CARE was found on 4/17/2020 from Hospital Encounter on 4/16/2020     HPI, PMH, SH: Reviewed    Unit where seen by Wound Team: [id number]     WOUND CONSULT/FOLLOW-UP RELATED TO: LLE    Self Report / Pain Level:  Some discomfort with slough removal      OBJECTIVE:  drsg saturated, pt able to lift legs when asked, LPS APRN in to see pt    WOUND TYPE, LOCATION, CHARACTERISTICS (Pressure Injuries: location, stage, POA or date identified)  Wound 04/16/20 Venous Ulcer Ankle Medial;Posterior Left -Left Medial/Posterior Ankle (Active)   Site Assessment Yellow;Red;Pink;Drainage    Periwound Assessment Edema;Red    Margins Undefined edges    Closure Secondary intention    Drainage Amount Large    Drainage Description Serous    Treatments Cleansed    Wound Cleansing Approved Wound Cleanser    Periwound Protectant Viscopaste    Dressing Cleansing/Solutions Not Applicable    Dressing Options Viscopaste;Nonadhesive Foam;Absorbent Abdominal Pad;Compression Wrap Two Layer    Dressing Changed Changed    Dressing Status Intact    Dressing Change/Treatment Frequency Every 48 hrs, and As Needed    NEXT Dressing Change/Treatment Date 04/21/20    NEXT Weekly Photo (Inpatient Only) 04/23/20    Non-staged Wound Description Full thickness    Wound Bed Slough % - (Post-Procedure) 40 % 4/19/2020  2:15 PM   Tunneling (cm) 0 cm 4/19/2020  2:15 PM   Undermining (cm) 0 cm 4/19/2020  2:15 PM   Shape irregular    Pulses Doppler    Exposed Structures None    Number of days: 3       Wound 04/16/20 Venous Ulcer Leg Anterior Left -Left Anterior LE (Active)      4/17/2020  5:00 PM   Site Assessment Slough;Yellow;Pink;Red;Drainage    Periwound Assessment Red    Margins Attached edges;Defined edges    Closure Secondary intention    Drainage Amount Large    Drainage Description Serosanguineous    Treatments  Cleansed    Wound Cleansing Approved Wound Cleanser    Periwound Protectant Not Applicable    Dressing Cleansing/Solutions Not Applicable    Dressing Options Viscopaste;Nonadhesive Foam;Absorbent Abdominal Pad;Compression Wrap Two Layer    Dressing Changed Changed    Dressing Status Clean;Dry;Intact    Dressing Change/Treatment Frequency Every 48 hrs, and As Needed    NEXT Dressing Change/Treatment Date 04/21/20    NEXT Weekly Photo (Inpatient Only) 04/23/20    Non-staged Wound Description Full thickness    Wound Bed Slough (%) 90 %    Shape Irregular/ Scattered 4/17/2020  5:00 PM   Wound Odor Mild 4/17/2020  5:00 PM   Pulses 1+;DP 4/17/2020  5:00 PM   Exposed Structures None 4/17/2020  5:00 PM   Number of days: 3       Vascular:  US-BHUPINDER single level bilateral 12/27/2019     RIGHT      Waveform            Systolic BPs (mmHg)                              117           Brachial   Triphasic                                Common Femoral   Triphasic                  153           Posterior Tibial   Triphasic                  129           Dorsalis Pedis                                            Peroneal                              1.31          BHUPINDER                                            TBI                           LEFT   Waveform        Systolic BPs (mmHg)                              101           Brachial   Triphasic                                Common Femoral   Biphasic                                 Posterior Tibial   Bi, non-                                 Dorsalis Pedis   reversed                                            Peroneal                                            BHUPINDER                                            TBI         Findings   Right.    Doppler waveform of the common femoral artery is of high amplitude and    triphasic.    Doppler waveforms at the ankle are brisk and triphasic.    Ankle-brachial index is normal.       Left.    Could not perform ankle-brachial index due to large  blisters/ulcers.   Normal toe-brachial index: 0.94   Doppler waveform of the common femoral artery is of high amplitude and    triphasic.    Biphasic waveforms seen at the ankle.          Lab Values:    Lab Results   Component Value Date/Time    WBC 12.4 (H) 04/19/2020 12:55 AM    RBC 3.51 (L) 04/19/2020 12:55 AM    HEMOGLOBIN 10.9 (L) 04/19/2020 12:55 AM    HEMATOCRIT 34.0 (L) 04/19/2020 12:55 AM    CREACTPROT 31.70 (H) 04/16/2020 11:40 AM    SEDRATEWES 97 (H) 04/16/2020 11:40 AM    HBA1C 5.7 (H) 01/23/2020 11:22 AM        Culture Results show:  Recent Results (from the past 720 hour(s))   CULTURE WOUND W/ GRAM STAIN    Collection Time: 04/16/20  1:10 PM   Result Value Ref Range    Significant Indicator POS (POS)     Source WND     Site LEFT LEG     Culture Result - (A)     Gram Stain Result Rare WBCs.  Moderate Gram positive cocci.       Culture Result (A)      Methicillin Resistant Staphylococcus aureus  Moderate growth      Culture Result (A)      Beta Hemolytic Streptococcus group A  Moderate growth         Susceptibility    Methicillin resistant staphylococcus aureus - SHANON     Azithromycin >4 Resistant mcg/mL     Clindamycin <=0.5 Sensitive mcg/mL     Cefazolin <=8 Resistant mcg/mL     Ceftaroline <=0.5 Sensitive mcg/mL     Daptomycin <=1 Sensitive mcg/mL     Ampicillin/sulbactam <=8/4 Resistant mcg/mL     Erythromycin >4 Resistant mcg/mL     Vancomycin 1 Sensitive mcg/mL     Oxacillin >2 Resistant mcg/mL     Penicillin >8 Resistant mcg/mL     Trimeth/Sulfa <=0.5/9.5 Sensitive mcg/mL     Tetracycline <=4 Sensitive mcg/mL         INTERVENTIONS BY WOUND TEAM:  Removed drsg, cleaned leg with wound cleanser, using wash cloth moistened with warm water removed 20% of yellow slough from wound bed non-selectively. Cleaned again. Applied viscopaste to affected areas, covered with non-adhesive foam and abd pads and then applied 2 layer wrap.     Interdisciplinary consultation: Patient, Bedside RN, LPS APRN          EVALUATION: Pt's wounds with mixture of adherent and non-adherent slough. Large amount of drainage saturated drsg and pad underneath. Added non-adhesive foam to try to absorb more drainage, two layer wrap applied for additional compression.     Goals: Slow steady decrease in wound area and depth weekly.    NURSING PLAN OF CARE ORDERS (X):     Dressing changes: Continue previous Dressing Maintenance orders:        See new Dressing Maintenance orders:   x    Skin care: See Skin Care orders:        Rectal tube care: See Rectal Tube Care orders:      Other orders:           WOUND TEAM PLAN OF CARE:   Dressing changes by wound team:   1-3 x/week       Follow up 1-2 times weekly:               Follow up 3 times weekly:                NPWT change 3 times weekly:     Follow up as needed:       Other (explain):     Anticipated discharge plans:   LTACH:        SNF/Rehab:                  Home Care:           Outpatient Wound Center:   x         Self Care:           Other: will need wound care LLE

## 2020-04-19 NOTE — CARE PLAN
Problem: Infection  Goal: Will remain free from infection  Outcome: PROGRESSING AS EXPECTED   Contact ISO precautions in place - appropriate signage and equipment in use. IV ABX administered per order.     Problem: Knowledge Deficit  Goal: Knowledge of disease process/condition, treatment plan, diagnostic tests, and medications will improve  Outcome: PROGRESSING AS EXPECTED   Patient is A&Ox4, educated on plan of care, all questions answered.

## 2020-04-19 NOTE — PROGRESS NOTES
Assume care of pt at 0700. Report received from NOC RN. Pt is A/O x4. Pain is 10/10 in LLE, medicated per MAR. Pt is resting in bed. Bed in lowest and locked position, call light in reach, hourly rounding in place. Labs reviewed. Communication board updated. Will continue to monitor.     Pt sitting up for breakfast this morning, in a good mood. Needs discussed, no further needs at this time.

## 2020-04-20 ENCOUNTER — PATIENT OUTREACH (OUTPATIENT)
Dept: HEALTH INFORMATION MANAGEMENT | Facility: OTHER | Age: 65
End: 2020-04-20

## 2020-04-20 LAB
ALBUMIN SERPL BCP-MCNC: 2.8 G/DL (ref 3.2–4.9)
ALBUMIN/GLOB SERPL: 0.6 G/DL
ALP SERPL-CCNC: 126 U/L (ref 30–99)
ALT SERPL-CCNC: 27 U/L (ref 2–50)
ANION GAP SERPL CALC-SCNC: 14 MMOL/L (ref 7–16)
AST SERPL-CCNC: 24 U/L (ref 12–45)
BASOPHILS # BLD AUTO: 0.4 % (ref 0–1.8)
BASOPHILS # BLD: 0.04 K/UL (ref 0–0.12)
BILIRUB SERPL-MCNC: 0.7 MG/DL (ref 0.1–1.5)
BUN SERPL-MCNC: 35 MG/DL (ref 8–22)
CALCIUM SERPL-MCNC: 8.2 MG/DL (ref 8.5–10.5)
CHLORIDE SERPL-SCNC: 104 MMOL/L (ref 96–112)
CO2 SERPL-SCNC: 20 MMOL/L (ref 20–33)
CREAT SERPL-MCNC: 1.55 MG/DL (ref 0.5–1.4)
EOSINOPHIL # BLD AUTO: 0.11 K/UL (ref 0–0.51)
EOSINOPHIL NFR BLD: 1.2 % (ref 0–6.9)
ERYTHROCYTE [DISTWIDTH] IN BLOOD BY AUTOMATED COUNT: 53.7 FL (ref 35.9–50)
GLOBULIN SER CALC-MCNC: 4.9 G/DL (ref 1.9–3.5)
GLUCOSE SERPL-MCNC: 110 MG/DL (ref 65–99)
HCT VFR BLD AUTO: 33.8 % (ref 42–52)
HGB BLD-MCNC: 10.8 G/DL (ref 14–18)
IMM GRANULOCYTES # BLD AUTO: 0.15 K/UL (ref 0–0.11)
IMM GRANULOCYTES NFR BLD AUTO: 1.7 % (ref 0–0.9)
LYMPHOCYTES # BLD AUTO: 0.6 K/UL (ref 1–4.8)
LYMPHOCYTES NFR BLD: 6.7 % (ref 22–41)
MCH RBC QN AUTO: 30.4 PG (ref 27–33)
MCHC RBC AUTO-ENTMCNC: 32 G/DL (ref 33.7–35.3)
MCV RBC AUTO: 95.2 FL (ref 81.4–97.8)
MONOCYTES # BLD AUTO: 0.85 K/UL (ref 0–0.85)
MONOCYTES NFR BLD AUTO: 9.4 % (ref 0–13.4)
NEUTROPHILS # BLD AUTO: 7.25 K/UL (ref 1.82–7.42)
NEUTROPHILS NFR BLD: 80.6 % (ref 44–72)
NRBC # BLD AUTO: 0 K/UL
NRBC BLD-RTO: 0 /100 WBC
PLATELET # BLD AUTO: 332 K/UL (ref 164–446)
PMV BLD AUTO: 9.2 FL (ref 9–12.9)
POTASSIUM SERPL-SCNC: 4.4 MMOL/L (ref 3.6–5.5)
PROT SERPL-MCNC: 7.7 G/DL (ref 6–8.2)
RBC # BLD AUTO: 3.55 M/UL (ref 4.7–6.1)
SODIUM SERPL-SCNC: 138 MMOL/L (ref 135–145)
WBC # BLD AUTO: 9 K/UL (ref 4.8–10.8)

## 2020-04-20 PROCEDURE — 99232 SBSQ HOSP IP/OBS MODERATE 35: CPT | Performed by: FAMILY MEDICINE

## 2020-04-20 PROCEDURE — 700111 HCHG RX REV CODE 636 W/ 250 OVERRIDE (IP): Performed by: FAMILY MEDICINE

## 2020-04-20 PROCEDURE — A9270 NON-COVERED ITEM OR SERVICE: HCPCS | Performed by: FAMILY MEDICINE

## 2020-04-20 PROCEDURE — 700102 HCHG RX REV CODE 250 W/ 637 OVERRIDE(OP): Performed by: FAMILY MEDICINE

## 2020-04-20 PROCEDURE — 99233 SBSQ HOSP IP/OBS HIGH 50: CPT | Mod: GC | Performed by: INTERNAL MEDICINE

## 2020-04-20 PROCEDURE — 36415 COLL VENOUS BLD VENIPUNCTURE: CPT

## 2020-04-20 PROCEDURE — 770021 HCHG ROOM/CARE - ISO PRIVATE

## 2020-04-20 PROCEDURE — 80053 COMPREHEN METABOLIC PANEL: CPT

## 2020-04-20 PROCEDURE — 85025 COMPLETE CBC W/AUTO DIFF WBC: CPT

## 2020-04-20 PROCEDURE — 700105 HCHG RX REV CODE 258: Performed by: FAMILY MEDICINE

## 2020-04-20 RX ORDER — LINEZOLID 600 MG/1
600 TABLET, FILM COATED ORAL EVERY 12 HOURS
Status: COMPLETED | OUTPATIENT
Start: 2020-04-20 | End: 2020-04-25

## 2020-04-20 RX ADMIN — SERTRALINE HYDROCHLORIDE 100 MG: 100 TABLET ORAL at 05:24

## 2020-04-20 RX ADMIN — LINEZOLID 600 MG: 600 TABLET, FILM COATED ORAL at 18:29

## 2020-04-20 RX ADMIN — HEPARIN SODIUM 5000 UNITS: 5000 INJECTION, SOLUTION INTRAVENOUS; SUBCUTANEOUS at 08:08

## 2020-04-20 RX ADMIN — OXYCODONE 10 MG: 5 TABLET ORAL at 08:17

## 2020-04-20 RX ADMIN — ATORVASTATIN CALCIUM 40 MG: 40 TABLET, FILM COATED ORAL at 05:24

## 2020-04-20 RX ADMIN — OXYCODONE 10 MG: 5 TABLET ORAL at 21:59

## 2020-04-20 RX ADMIN — LINEZOLID 600 MG: 600 INJECTION, SOLUTION INTRAVENOUS at 05:23

## 2020-04-20 RX ADMIN — FERROUS GLUCONATE 324 MG: 324 TABLET ORAL at 08:08

## 2020-04-20 RX ADMIN — HEPARIN SODIUM 5000 UNITS: 5000 INJECTION, SOLUTION INTRAVENOUS; SUBCUTANEOUS at 16:30

## 2020-04-20 RX ADMIN — OXYCODONE 10 MG: 5 TABLET ORAL at 04:15

## 2020-04-20 RX ADMIN — SODIUM CHLORIDE: 9 INJECTION, SOLUTION INTRAVENOUS at 05:25

## 2020-04-20 RX ADMIN — OXYCODONE 10 MG: 5 TABLET ORAL at 16:30

## 2020-04-20 ASSESSMENT — ENCOUNTER SYMPTOMS
VOMITING: 0
MYALGIAS: 0
SPUTUM PRODUCTION: 0
DOUBLE VISION: 0
NECK PAIN: 0
PHOTOPHOBIA: 0
HEMOPTYSIS: 0
WEIGHT LOSS: 0
EYE PAIN: 0
DIZZINESS: 0
HEADACHES: 0
SHORTNESS OF BREATH: 0
ORTHOPNEA: 0
PALPITATIONS: 0
ABDOMINAL PAIN: 0
TINGLING: 0
BACK PAIN: 0
NAUSEA: 0
CHILLS: 0

## 2020-04-20 ASSESSMENT — PAIN SCALES - WONG BAKER: WONGBAKER_NUMERICALRESPONSE: HURTS JUST A LITTLE BIT

## 2020-04-20 NOTE — PROGRESS NOTES
Infectious diseases Follow-up Consult Note    Date of note:    4/20/2020      Consulting Physician: ALEJANDRO Krishnamurthy M.D.      Chief Complaint   Patient presents with   • Wound Check     Pt reports to have left leg wound, being seen at wound care, sent by MD for further care. pt denies fever, but states wounds on leg are draining.        Interim Events:  Patient denies any complaints today  Reports that his legs feel better compared to yesterday  Afebrile, leukocytosis resolved  Wound care on board    ROS  Constitutional: Negative for fevers or chills.   Respiratory: Negative for cough and shortness of breath.    Cardiovascular: Positive for leg swelling.   Gastrointestinal: Negative for abdominal pain, diarrhea, nausea and vomiting.   Genitourinary: Negative for dysuria.   Musculoskeletal: Positive for myalgias.   Neurological: Negative for dizziness and headaches.   All other systems reviewed and are negative.    Past medical, surgical, social, and family history reviewed and unchanged from admission except as noted in assessment and plan.    Medications: Reviewed in MAR  Current Facility-Administered Medications   Medication Dose Frequency Provider Last Rate Last Dose   • linezolid (ZYVOX) tablet 600 mg  600 mg Q12HRS ALEJANDRO Krishnamurthy M.D.       • amitriptyline (ELAVIL) tablet 25 mg  25 mg HS PRN Silverio Lopez M.D.       • atorvastatin (LIPITOR) tablet 40 mg  40 mg DAILY Silverio Lopez M.D.   40 mg at 04/20/20 0524   • ferrous gluconate (FERGON) tablet 324 mg  324 mg QDAY with Breakfast Silverio Lopez M.D.   324 mg at 04/20/20 0808   • sertraline (ZOLOFT) tablet 100 mg  100 mg DAILY Silverio Lopez M.D.   100 mg at 04/20/20 0524   • acetaminophen (TYLENOL) tablet 650 mg  650 mg Q6HRS PRN Silverio Lopez M.D.   650 mg at 04/17/20 1736   • ondansetron (ZOFRAN) syringe/vial injection 4 mg  4 mg Q4HRS PRN Silverio Lopez M.D.       • ondansetron (ZOFRAN ODT) dispertab 4 mg  4 mg Q4HRS PRN Silverio  SUZANNA Lopez       • promethazine (PHENERGAN) tablet 12.5-25 mg  12.5-25 mg Q4HRS PRLEON Lopez M.D.       • promethazine (PHENERGAN) suppository 12.5-25 mg  12.5-25 mg Q4HRS PRLEON Lopez M.D.       • prochlorperazine (COMPAZINE) injection 5-10 mg  5-10 mg Q4HRS PRLEON Lopez M.D.       • heparin injection 5,000 Units  5,000 Units Q8HRS Silverio Lopez M.D.   5,000 Units at 04/20/20 0808   • hydrALAZINE (APRESOLINE) injection 10 mg  10 mg Q6HRS PRLEON Lopez M.D.       • oxyCODONE immediate-release (ROXICODONE) tablet 5-10 mg  5-10 mg Q4HRS PRLEON Lopez M.D.   10 mg at 04/20/20 0817   • morphine (pf) 4 MG/ML injection 2-4 mg  2-4 mg Q4HRS PRLEON Lopez M.D.   2 mg at 04/16/20 2004   Last reviewed on 4/16/2020  2:36 PM by Viktoriya Lui    Allergies   Allergen Reactions   • Norco [Hydrocodone-Acetaminophen] Itching       Physical Exam  Body mass index is 25 kg/m². /69   Pulse 74   Temp 37.1 °C (98.7 °F) (Temporal)   Resp 18   Ht 1.829 m (6')   Wt 83.6 kg (184 lb 4.9 oz)   SpO2 91%    Vitals:    04/19/20 1630 04/19/20 1930 04/20/20 0410 04/20/20 0756   BP: 105/62 110/76 140/87 117/69   Pulse: 89 78 91 74   Resp: 18 16 17 18   Temp: 37.1 °C (98.8 °F) 37.4 °C (99.3 °F) 36.7 °C (98 °F) 37.1 °C (98.7 °F)   TempSrc: Temporal Temporal Temporal Temporal   SpO2: 90% 95% 92% 91%   Weight:       Height:        Oxygen Therapy:  Pulse Oximetry: 91 %, O2 (LPM): 0, O2 Delivery Device: None - Room Air    General: no apparent distress  HEENT: NA, AT, conjunctiva normal   Lungs: normal respiratory effort, CTAB  Heart: RRR, no murmurs  EXT:b/l 2+ LE edema. LLE in an ACE wrap. Chronic venous stasis changes noted bilaterally.  Abdomen: soft, non tender, non distended  Neurological: A/O x 3. No focal sensory deficits      Labs (personally reviewed and notable for):   Recent Results (from the past 24 hour(s))   CBC WITH DIFFERENTIAL    Collection Time: 04/20/20  2:45  AM   Result Value Ref Range    WBC 9.0 4.8 - 10.8 K/uL    RBC 3.55 (L) 4.70 - 6.10 M/uL    Hemoglobin 10.8 (L) 14.0 - 18.0 g/dL    Hematocrit 33.8 (L) 42.0 - 52.0 %    MCV 95.2 81.4 - 97.8 fL    MCH 30.4 27.0 - 33.0 pg    MCHC 32.0 (L) 33.7 - 35.3 g/dL    RDW 53.7 (H) 35.9 - 50.0 fL    Platelet Count 332 164 - 446 K/uL    MPV 9.2 9.0 - 12.9 fL    Neutrophils-Polys 80.60 (H) 44.00 - 72.00 %    Lymphocytes 6.70 (L) 22.00 - 41.00 %    Monocytes 9.40 0.00 - 13.40 %    Eosinophils 1.20 0.00 - 6.90 %    Basophils 0.40 0.00 - 1.80 %    Immature Granulocytes 1.70 (H) 0.00 - 0.90 %    Nucleated RBC 0.00 /100 WBC    Neutrophils (Absolute) 7.25 1.82 - 7.42 K/uL    Lymphs (Absolute) 0.60 (L) 1.00 - 4.80 K/uL    Monos (Absolute) 0.85 0.00 - 0.85 K/uL    Eos (Absolute) 0.11 0.00 - 0.51 K/uL    Baso (Absolute) 0.04 0.00 - 0.12 K/uL    Immature Granulocytes (abs) 0.15 (H) 0.00 - 0.11 K/uL    NRBC (Absolute) 0.00 K/uL   Comp Metabolic Panel    Collection Time: 04/20/20  2:45 AM   Result Value Ref Range    Sodium 138 135 - 145 mmol/L    Potassium 4.4 3.6 - 5.5 mmol/L    Chloride 104 96 - 112 mmol/L    Co2 20 20 - 33 mmol/L    Anion Gap 14.0 7.0 - 16.0    Glucose 110 (H) 65 - 99 mg/dL    Bun 35 (H) 8 - 22 mg/dL    Creatinine 1.55 (H) 0.50 - 1.40 mg/dL    Calcium 8.2 (L) 8.5 - 10.5 mg/dL    AST(SGOT) 24 12 - 45 U/L    ALT(SGPT) 27 2 - 50 U/L    Alkaline Phosphatase 126 (H) 30 - 99 U/L    Total Bilirubin 0.7 0.1 - 1.5 mg/dL    Albumin 2.8 (L) 3.2 - 4.9 g/dL    Total Protein 7.7 6.0 - 8.2 g/dL    Globulin 4.9 (H) 1.9 - 3.5 g/dL    A-G Ratio 0.6 g/dL   ESTIMATED GFR    Collection Time: 04/20/20  2:45 AM   Result Value Ref Range    GFR If  55 (A) >60 mL/min/1.73 m 2    GFR If Non African American 45 (A) >60 mL/min/1.73 m 2           ASSESSMENT & PLAN    #LLE cellulitis  #Chronic venous stasis changes in bilateral lower extremities  #LUPE- improving  #Polysubstance abuse with methamphetamines and cannabinoids  #Hepatitis C  status post treatment    -Afebrile, leukocytosis resolved  -Wound cultures positive for group A strep and MRSA  -Blood culture showed no growth to date  -CT left lower extremity showed extensive subcutaneous edema inflammation along the fascial planes superficial to the gastrocnemius.  No signs of necrotizing fasciitis.    -Continue linezolid for a total duration of 10 days  -Monitor CBC while on linezolid  -Continue wound care and recommend follow-up with outpatient wound clinic    Case discussed with Dr. Florez, ID to sign off. Please call with any questions.

## 2020-04-20 NOTE — CONSULTS
4/20/2020   Ortho Consult    Goran Chavez is a 64 y.o. male who presents with venous stasis wounds on his left lower extremity.  Incidentally he has a charcot foot on the left with midfoot collapse and hammertoes.  He states this started a couple years ago when he broke his foot walking his dog. He denies any recent ulcers in his foot other than the venous stasis wounds he is here for today.    Past Medical History:   Diagnosis Date   • Anxiety    • Charcot's joint of left foot, non-diabetic 3/21/2016   • Hepatitis C, chronic (HCC)    • Hypertension    • Kidney disease    • Migraine    • Polysubstance abuse (HCC) 3/8/2018   • Tobacco use 4/18/2016   • Ulcer of left lower extremity with necrosis of muscle (HCC) 3/21/2016   • Venous stasis ulcer ([facility]) 2017    bilateral lower extremity       Past Surgical History:   Procedure Laterality Date   • TIBIA ORIF Right 1997   • SHOULDER ORIF Left 1997   • HAND SURGERY Left     due to infection   • PB DRAIN SKIN ABSCESS SIMPLE Left     leg, near the knee, remote       Medications  No current facility-administered medications on file prior to encounter.      Current Outpatient Medications on File Prior to Encounter   Medication Sig Dispense Refill   • acetaminophen (TYLENOL) 500 MG Tab Take 1,000 mg by mouth 1 time daily as needed for Moderate Pain.     • amitriptyline (ELAVIL) 25 MG Tab Take 1 Tab by mouth at bedtime as needed for Sleep. 30 Tab 3   • sertraline (ZOLOFT) 100 MG Tab Take 1 Tab by mouth every day. 30 Tab 3   • ferrous gluconate (FERGON) 324 (38 Fe) MG Tab Take 1 Tab by mouth every morning with breakfast. 30 Tab 5   • ascorbic acid (VITAMIN C) 500 MG tablet Take 1 Tab by mouth every day. 30 Tab 5   • amLODIPine (NORVASC) 10 MG Tab Take 1 Tab by mouth every day. 30 Tab 5   • atorvastatin (LIPITOR) 40 MG Tab Take 1 Tab by mouth every day. 30 Tab 11       Allergies  Norco [hydrocodone-acetaminophen]    ROS  See hpi. All other systems were reviewed and found  to be negative    Family History   Problem Relation Age of Onset   • Lung Disease Mother 70         from COPD   • Hypertension Mother    • Other Father 82         from brain aneurysm after fall   • Cancer Neg Hx    • Heart Disease Neg Hx    • Stroke Neg Hx    • Diabetes Neg Hx        Social History     Socioeconomic History   • Marital status: Legally      Spouse name: Not on file   • Number of children: Not on file   • Years of education: Not on file   • Highest education level: Not on file   Occupational History   • Not on file   Social Needs   • Financial resource strain: Not hard at all   • Food insecurity     Worry: Never true     Inability: Never true   • Transportation needs     Medical: No     Non-medical: No   Tobacco Use   • Smoking status: Former Smoker     Packs/day: 0.00     Years: 48.00     Pack years: 0.00     Types: Cigarettes     Last attempt to quit: 2018     Years since quittin.3   • Smokeless tobacco: Never Used   • Tobacco comment: states he is using 7mg nicotine patch   Substance and Sexual Activity   • Alcohol use: No     Alcohol/week: 7.2 oz     Types: 12 Cans of beer per week     Comment: history of alcoholism    • Drug use: Yes     Types: Marijuana, Inhaled, Methamphetamines     Comment: MJ every day, intermittent meth use   • Sexual activity: Not on file   Lifestyle   • Physical activity     Days per week: Not on file     Minutes per session: Not on file   • Stress: Not on file   Relationships   • Social connections     Talks on phone: Not on file     Gets together: Not on file     Attends [employer]ism service: Not on file     Active member of club or organization: Not on file     Attends meetings of clubs or organizations: Not on file     Relationship status: Not on file   • Intimate partner violence     Fear of current or ex partner: Not on file     Emotionally abused: Not on file     Physically abused: Not on file     Forced sexual activity: Not on file   Other  Topics Concern   • Not on file   Social History Narrative   • Not on file       Physical Exam  Vitals  /69   Pulse 74   Temp 37.1 °C (98.7 °F) (Temporal)   Resp 18   Ht 1.829 m (6')   Wt 83.6 kg (184 lb 4.9 oz)   SpO2 91%   General: Well Developed, Well Nourished, no acute distress  HEENT: Normocephalic, atraumatic  Eyes: Anicteric, PERRLA, EOMI  Neck: Supple, nontender, no masses  Lungs: CTA, no wheezes or crackles  Heart: RRR, no murmurs, rubs or gallops  Abdomen: Soft, NT, ND  Pelvis: Stable to AP and Lateral Compression  Skin: venous stasis lle  Extremities: stasis ulcers lle, left flat foot with hammertoes, no skin compromise from bony deformity  Neuro: intact  Vascular: intact, Capillary refill <2 seconds    Radiographs:  US-RENAL   Final Result      1.  Right kidney appears smaller than left kidney which could be due to atrophy.      2.  No hydronephrosis.      3.  Incidentally noted small right pleural effusion.      CT-EXTREMITY, LOWER WITH LEFT   Final Result      1.  Extensive subcutaneous edema with overlying skin thickening. Inflammation extends along the fascial planes with curvilinear fluid superficial to the gastrocnemius muscle. No soft tissue gas to suggest necrotizing fasciitis.      2.  Osteopenia          Laboratory Values  Recent Labs     04/18/20  0040 04/19/20  0055 04/20/20  0245   WBC 16.2* 12.4* 9.0   RBC 3.51* 3.51* 3.55*   HEMOGLOBIN 10.8* 10.9* 10.8*   HEMATOCRIT 33.4* 34.0* 33.8*   MCV 95.2 96.9 95.2   MCH 30.8 31.1 30.4   MCHC 32.3* 32.1* 32.0*   RDW 53.2* 55.5* 53.7*   PLATELETCT 266 328 332   MPV 9.6 9.6 9.2     Recent Labs     04/18/20  0040 04/19/20  0055 04/20/20  0245   SODIUM 133* 134* 138   POTASSIUM 4.4 4.4 4.4   CHLORIDE 100 99 104   CO2 18* 19* 20   GLUCOSE 125* 108* 110*   BUN 33* 42* 35*             Impression:Left charcot midfoot, hammertoes, venous stasis ulcers    -Continue wound care for venous stasis ulcers  -No ulcers related to bony pressure or  deformity at the time requiring urgent surgery  -If patient desires he may follow up in LPS rounds for management of his charcot foot on an outpatient basis, he seemed receptive to this plan as he would prefer to follow up with us rather than going to [facility].

## 2020-04-20 NOTE — CARE PLAN
Problem: Communication  Goal: The ability to communicate needs accurately and effectively will improve  Outcome: PROGRESSING AS EXPECTED  Note: Patient effectively using call bell and communicating needs at this time     Problem: Safety  Goal: Will remain free from falls  Outcome: PROGRESSING AS EXPECTED  Note: Reminding pt to use call bell when needing to ambulate, call bell in reach, bed alarm on.

## 2020-04-20 NOTE — CARE PLAN
Problem: Safety  Goal: Will remain free from falls  Intervention: Implement fall precautions  Flowsheets (Taken 4/20/2020 1670)  Bed Alarm: Yes - Alarm On  Environmental Precautions:   Personal Belongings, Wastebasket, Call Bell etc. in Easy Reach   Transferred to Stronger Side   Report Given to Other Health Care Providers Regarding Fall Risk   Bed in Low Position   Communication Sign for Patients & Families   Mobility Assessed & Appropriate Sign Placed     Problem: Knowledge Deficit  Goal: Knowledge of disease process/condition, treatment plan, diagnostic tests, and medications will improve  Outcome: PROGRESSING AS EXPECTED  Intervention: Explain information regarding disease process/condition, treatment plan, diagnostic tests, and medications and document in education  Note: Pt updated on POC

## 2020-04-20 NOTE — PROGRESS NOTES
Assumed care of patient at 1900. Pt A&Ox4. Sitting on side of bed, bed alarm on, call bell in reach, bed in low and locked position. Pt denies any needs at this time

## 2020-04-20 NOTE — PROGRESS NOTES
[facility] Medicine Daily Progress Note    Date of Service  4/20/2020    Chief Complaint  64 y.o. male admitted 4/16/2020 with Left leg redness and swelling.    Hospital Course    64 y.o. male who presented 4/16/2020 with left leg redness and swelling.  Patient has no history of venous stasis dermatitis of both legs, and venous stasis ulcers of the left leg.  Patient states that he was discharged from wound care month ago and said the ulcers had resolved.  But then a week ago he noted that ulcers came back he also noticed increasing redness and swelling and pain.  He denied having any fever or chills, cough or congestion, shortness of breath, abdominal pain, nausea or vomiting, or diarrhea.  On review of his previous wound cultures, he has history of MRSA, Pseudomonas, Serratia, and group B streptococcus.    Interval Problem Update  Awaiting ortho recommendations  Needs to be on zyvox for 10 days    Consultants/Specialty  I.D  LPS  ortho    Code Status  full    Disposition  pending    Review of Systems  Review of Systems   Constitutional: Negative for chills, malaise/fatigue and weight loss.   HENT: Negative for ear discharge, ear pain and nosebleeds.    Eyes: Negative for double vision, photophobia and pain.   Respiratory: Negative for hemoptysis, sputum production and shortness of breath.    Cardiovascular: Negative for chest pain, palpitations and orthopnea.   Gastrointestinal: Negative for abdominal pain, nausea and vomiting.   Genitourinary: Negative for frequency, hematuria and urgency.   Musculoskeletal: Negative for back pain, myalgias and neck pain.   Skin: Negative for itching.   Neurological: Negative for dizziness, tingling and headaches.        Physical Exam  Temp:  [36.7 °C (98 °F)-37.4 °C (99.3 °F)] 37.1 °C (98.7 °F)  Pulse:  [74-91] 74  Resp:  [16-18] 18  BP: (105-140)/(62-87) 117/69  SpO2:  [90 %-95 %] 91 %    Physical Exam  Constitutional:       General: He is not in acute distress.     Appearance:  He is not toxic-appearing.   HENT:      Head: Normocephalic and atraumatic.      Nose: No congestion or rhinorrhea.      Mouth/Throat:      Mouth: Mucous membranes are dry.   Eyes:      Conjunctiva/sclera: Conjunctivae normal.   Neck:      Musculoskeletal: No neck rigidity or muscular tenderness.   Cardiovascular:      Rate and Rhythm: Normal rate and regular rhythm.      Heart sounds: No murmur. No friction rub.   Pulmonary:      Effort: Pulmonary effort is normal.      Breath sounds: Normal breath sounds.   Abdominal:      General: Bowel sounds are normal.   Genitourinary:     Comments: Ulcer on glans penis has healed  Musculoskeletal:         General: Tenderness (left leg) present.   Skin:     Coloration: Skin is not jaundiced or pale.      Comments: Dressing on the left leg is noted   Neurological:      Mental Status: He is oriented to person, place, and time. Mental status is at baseline.         Fluids    Intake/Output Summary (Last 24 hours) at 4/20/2020 0919  Last data filed at 4/20/2020 0410  Gross per 24 hour   Intake 120 ml   Output --   Net 120 ml       Laboratory  Recent Labs     04/18/20  0040 04/19/20  0055 04/20/20  0245   WBC 16.2* 12.4* 9.0   RBC 3.51* 3.51* 3.55*   HEMOGLOBIN 10.8* 10.9* 10.8*   HEMATOCRIT 33.4* 34.0* 33.8*   MCV 95.2 96.9 95.2   MCH 30.8 31.1 30.4   MCHC 32.3* 32.1* 32.0*   RDW 53.2* 55.5* 53.7*   PLATELETCT 266 328 332   MPV 9.6 9.6 9.2     Recent Labs     04/18/20  0040 04/19/20  0055 04/20/20  0245   SODIUM 133* 134* 138   POTASSIUM 4.4 4.4 4.4   CHLORIDE 100 99 104   CO2 18* 19* 20   GLUCOSE 125* 108* 110*   BUN 33* 42* 35*   CREATININE 1.92* 2.39* 1.55*   CALCIUM 8.3* 8.2* 8.2*                   Imaging       Assessment/Plan  * Cellulitis of left lower extremity- (present on admission)  Assessment & Plan  Wound culture result is noted  ID input is noted  LPS input is noted  Ortho is pending  Wound care input is noted  On zyvox     CT lower extremity showed:1.  Extensive  subcutaneous edema with overlying skin thickening. Inflammation extends along the fascial planes with curvilinear fluid superficial to the gastrocnemius muscle. No soft tissue gas to suggest necrotizing fasciitis.    Wound care input is noted+    Arterial ultrasound on 12/27/2019 showed:No evidence of arterial insufficiency in either lower extremity       Venous stasis ulcer of left lower extremity (HCC)- (present on admission)  Assessment & Plan   Wound care input is noted   limb preservation services input is noted  Ortho dr briggs is consulted    Hyponatremia- (present on admission)  Assessment & Plan  Has resolved    Venous stasis dermatitis of both lower extremities- (present on admission)  Assessment & Plan  Consult wound care    CKD (chronic kidney disease) stage 3, GFR 30-59 ml/min ([facility])- (present on admission)  Assessment & Plan  Acute on chronic  At baseline now  Renal US showed:1.  Right kidney appears smaller than left kidney which could be due to atrophy.     2.  No hydronephrosis.     3.  Incidentally noted small right pleural effusion.    Essential hypertension- (present on admission)  Assessment & Plan  Controlled  IV Hydralazine as needed with parameters    Methamphetamine abuse ([facility])- (present on admission)  Assessment & Plan  Positive  UDS       VTE prophylaxis: heparin

## 2020-04-21 ENCOUNTER — APPOINTMENT (OUTPATIENT)
Dept: RADIOLOGY | Facility: MEDICAL CENTER | Age: 65
DRG: 571 | End: 2020-04-21
Attending: NURSE PRACTITIONER
Payer: MEDICAID

## 2020-04-21 PROBLEM — E87.1 HYPONATREMIA: Status: RESOLVED | Noted: 2020-04-16 | Resolved: 2020-04-21

## 2020-04-21 LAB
ALBUMIN SERPL BCP-MCNC: 2.3 G/DL (ref 3.2–4.9)
ALBUMIN/GLOB SERPL: 0.5 G/DL
ALP SERPL-CCNC: 102 U/L (ref 30–99)
ALT SERPL-CCNC: 20 U/L (ref 2–50)
ANION GAP SERPL CALC-SCNC: 9 MMOL/L (ref 7–16)
ANISOCYTOSIS BLD QL SMEAR: ABNORMAL
AST SERPL-CCNC: 17 U/L (ref 12–45)
BACTERIA BLD CULT: NORMAL
BACTERIA BLD CULT: NORMAL
BASOPHILS # BLD AUTO: 0.9 % (ref 0–1.8)
BASOPHILS # BLD: 0.07 K/UL (ref 0–0.12)
BILIRUB SERPL-MCNC: 0.6 MG/DL (ref 0.1–1.5)
BUN SERPL-MCNC: 24 MG/DL (ref 8–22)
CALCIUM SERPL-MCNC: 7.8 MG/DL (ref 8.5–10.5)
CHLORIDE SERPL-SCNC: 106 MMOL/L (ref 96–112)
CO2 SERPL-SCNC: 21 MMOL/L (ref 20–33)
CREAT SERPL-MCNC: 1.12 MG/DL (ref 0.5–1.4)
EOSINOPHIL # BLD AUTO: 0 K/UL (ref 0–0.51)
EOSINOPHIL NFR BLD: 0 % (ref 0–6.9)
ERYTHROCYTE [DISTWIDTH] IN BLOOD BY AUTOMATED COUNT: 53.6 FL (ref 35.9–50)
GLOBULIN SER CALC-MCNC: 4.2 G/DL (ref 1.9–3.5)
GLUCOSE SERPL-MCNC: 106 MG/DL (ref 65–99)
HCT VFR BLD AUTO: 30.6 % (ref 42–52)
HGB BLD-MCNC: 9.9 G/DL (ref 14–18)
LYMPHOCYTES # BLD AUTO: 0.38 K/UL (ref 1–4.8)
LYMPHOCYTES NFR BLD: 5.2 % (ref 22–41)
MACROCYTES BLD QL SMEAR: ABNORMAL
MANUAL DIFF BLD: NORMAL
MCH RBC QN AUTO: 30.6 PG (ref 27–33)
MCHC RBC AUTO-ENTMCNC: 32.4 G/DL (ref 33.7–35.3)
MCV RBC AUTO: 94.4 FL (ref 81.4–97.8)
MONOCYTES # BLD AUTO: 0.31 K/UL (ref 0–0.85)
MONOCYTES NFR BLD AUTO: 4.3 % (ref 0–13.4)
MORPHOLOGY BLD-IMP: NORMAL
MYELOCYTES NFR BLD MANUAL: 0.9 %
NEUTROPHILS # BLD AUTO: 6.48 K/UL (ref 1.82–7.42)
NEUTROPHILS NFR BLD: 87.8 % (ref 44–72)
NEUTS BAND NFR BLD MANUAL: 0.9 % (ref 0–10)
NRBC # BLD AUTO: 0 K/UL
NRBC BLD-RTO: 0 /100 WBC
PLATELET # BLD AUTO: 311 K/UL (ref 164–446)
PLATELET BLD QL SMEAR: NORMAL
PMV BLD AUTO: 9 FL (ref 9–12.9)
POTASSIUM SERPL-SCNC: 5 MMOL/L (ref 3.6–5.5)
PROT SERPL-MCNC: 6.5 G/DL (ref 6–8.2)
RBC # BLD AUTO: 3.24 M/UL (ref 4.7–6.1)
RBC BLD AUTO: PRESENT
SIGNIFICANT IND 70042: NORMAL
SIGNIFICANT IND 70042: NORMAL
SITE SITE: NORMAL
SITE SITE: NORMAL
SODIUM SERPL-SCNC: 136 MMOL/L (ref 135–145)
SOURCE SOURCE: NORMAL
SOURCE SOURCE: NORMAL
WBC # BLD AUTO: 7.3 K/UL (ref 4.8–10.8)

## 2020-04-21 PROCEDURE — 700111 HCHG RX REV CODE 636 W/ 250 OVERRIDE (IP): Performed by: FAMILY MEDICINE

## 2020-04-21 PROCEDURE — 99233 SBSQ HOSP IP/OBS HIGH 50: CPT | Performed by: INTERNAL MEDICINE

## 2020-04-21 PROCEDURE — 73701 CT LOWER EXTREMITY W/DYE: CPT | Mod: LT

## 2020-04-21 PROCEDURE — A9270 NON-COVERED ITEM OR SERVICE: HCPCS | Performed by: FAMILY MEDICINE

## 2020-04-21 PROCEDURE — 770021 HCHG ROOM/CARE - ISO PRIVATE

## 2020-04-21 PROCEDURE — 700102 HCHG RX REV CODE 250 W/ 637 OVERRIDE(OP): Performed by: FAMILY MEDICINE

## 2020-04-21 PROCEDURE — 700117 HCHG RX CONTRAST REV CODE 255: Performed by: NURSE PRACTITIONER

## 2020-04-21 PROCEDURE — 80053 COMPREHEN METABOLIC PANEL: CPT

## 2020-04-21 PROCEDURE — 85027 COMPLETE CBC AUTOMATED: CPT

## 2020-04-21 PROCEDURE — 36415 COLL VENOUS BLD VENIPUNCTURE: CPT

## 2020-04-21 PROCEDURE — 85007 BL SMEAR W/DIFF WBC COUNT: CPT

## 2020-04-21 RX ORDER — LINEZOLID 600 MG/1
600 TABLET, FILM COATED ORAL EVERY 12 HOURS
Qty: 17 TAB | Refills: 0 | Status: SHIPPED
Start: 2020-04-21 | End: 2020-05-21

## 2020-04-21 RX ORDER — OXYCODONE HYDROCHLORIDE 5 MG/1
5-10 TABLET ORAL EVERY 8 HOURS PRN
Qty: 12 TAB | Refills: 0 | Status: SHIPPED | OUTPATIENT
Start: 2020-04-21 | End: 2020-05-21

## 2020-04-21 RX ADMIN — FERROUS GLUCONATE 324 MG: 324 TABLET ORAL at 08:32

## 2020-04-21 RX ADMIN — LINEZOLID 600 MG: 600 TABLET, FILM COATED ORAL at 18:12

## 2020-04-21 RX ADMIN — SERTRALINE HYDROCHLORIDE 100 MG: 100 TABLET ORAL at 05:05

## 2020-04-21 RX ADMIN — IOHEXOL 100 ML: 350 INJECTION, SOLUTION INTRAVENOUS at 17:31

## 2020-04-21 RX ADMIN — ATORVASTATIN CALCIUM 40 MG: 40 TABLET, FILM COATED ORAL at 05:05

## 2020-04-21 RX ADMIN — LINEZOLID 600 MG: 600 TABLET, FILM COATED ORAL at 05:05

## 2020-04-21 RX ADMIN — OXYCODONE 10 MG: 5 TABLET ORAL at 05:05

## 2020-04-21 RX ADMIN — OXYCODONE 5 MG: 5 TABLET ORAL at 14:40

## 2020-04-21 RX ADMIN — OXYCODONE 5 MG: 5 TABLET ORAL at 19:11

## 2020-04-21 RX ADMIN — HEPARIN SODIUM 5000 UNITS: 5000 INJECTION, SOLUTION INTRAVENOUS; SUBCUTANEOUS at 00:19

## 2020-04-21 RX ADMIN — HEPARIN SODIUM 5000 UNITS: 5000 INJECTION, SOLUTION INTRAVENOUS; SUBCUTANEOUS at 18:12

## 2020-04-21 RX ADMIN — HEPARIN SODIUM 5000 UNITS: 5000 INJECTION, SOLUTION INTRAVENOUS; SUBCUTANEOUS at 08:31

## 2020-04-21 RX ADMIN — OXYCODONE 10 MG: 5 TABLET ORAL at 09:08

## 2020-04-21 ASSESSMENT — ENCOUNTER SYMPTOMS
NAUSEA: 0
ABDOMINAL PAIN: 0
TINGLING: 0
DOUBLE VISION: 0
CHILLS: 0
EYE PAIN: 0
ORTHOPNEA: 0
HEADACHES: 0
PALPITATIONS: 0
BACK PAIN: 0
HEMOPTYSIS: 0
VOMITING: 0
MYALGIAS: 0
NECK PAIN: 0
SHORTNESS OF BREATH: 0
DIZZINESS: 0
WEIGHT LOSS: 0
SPUTUM PRODUCTION: 0
PHOTOPHOBIA: 0

## 2020-04-21 NOTE — PROGRESS NOTES
[id number]: Aox4. VSS on RA. C/o left lower leg pain, oxycodone PO 10mg given with good effect. Continent b/b, urinal at the bedside. Skin per flowsheet. Safety maintained, bed alarm set. WCTM.

## 2020-04-21 NOTE — PROGRESS NOTES
[facility] Medicine Daily Progress Note    Date of Service  4/21/2020    Chief Complaint  64 y.o. male admitted 4/16/2020 with Left leg redness and swelling.    Hospital Course    64 y.o. male who presented 4/16/2020 with left leg redness and swelling.  Patient has no history of venous stasis dermatitis of both legs, and venous stasis ulcers of the left leg.  Patient states that he was discharged from wound care month ago and said the ulcers had resolved.  But then a week ago he noted that ulcers came back he also noticed increasing redness and swelling and pain.  He denied having any fever or chills, cough or congestion, shortness of breath, abdominal pain, nausea or vomiting, or diarrhea.  On review of his previous wound cultures, he has history of MRSA, Pseudomonas, Serratia, and group B streptococcus.    Interval Problem Update  Patient was set up for discharge until he had his dressing changed today and mian pus and bloody discharge was noted from a tunneling lesion on his left lower extremity  T-max 99.6, P 74, RR 16, 92% on RA, 112/71  WBCs normal  Normocytic anemia slowly declining  Glucose 106    Consultants/Specialty  I.D  LPS  ortho    Code Status  full    Disposition  pending    Review of Systems  Review of Systems   Constitutional: Negative for chills, malaise/fatigue and weight loss.   HENT: Negative for ear discharge, ear pain and nosebleeds.    Eyes: Negative for double vision, photophobia and pain.   Respiratory: Negative for hemoptysis, sputum production and shortness of breath.    Cardiovascular: Negative for chest pain, palpitations and orthopnea.   Gastrointestinal: Negative for abdominal pain, nausea and vomiting.   Genitourinary: Negative for frequency, hematuria and urgency.   Musculoskeletal: Negative for back pain, myalgias and neck pain.        Left leg pain   Skin: Negative for itching.   Neurological: Negative for dizziness, tingling and headaches.        Physical Exam  Temp:  [36.8 °C  (98.3 °F)-37.6 °C (99.6 °F)] 36.8 °C (98.3 °F)  Pulse:  [74-87] 74  Resp:  [16-18] 16  BP: (110-155)/(71-91) 112/71  SpO2:  [92 %-98 %] 92 %    Physical Exam  Vitals signs and nursing note reviewed.   HENT:      Head: Normocephalic and atraumatic.      Nose: Nose normal.   Eyes:      Conjunctiva/sclera: Conjunctivae normal.      Pupils: Pupils are equal, round, and reactive to light.   Neck:      Musculoskeletal: Neck supple.   Cardiovascular:      Rate and Rhythm: Normal rate and regular rhythm.      Heart sounds: No murmur. No friction rub.   Pulmonary:      Effort: No respiratory distress.   Abdominal:      General: Bowel sounds are normal. There is no distension.      Palpations: Abdomen is soft.   Musculoskeletal:      Comments: Left lower extremity with tunneled ulceration and multiple wounds with mian pus and bloody discharge noted during dressing change; markedly atrophic calf and Achilles   Skin:     General: Skin is warm and dry.   Neurological:      Mental Status: He is alert.         Fluids    Intake/Output Summary (Last 24 hours) at 4/21/2020 1436  Last data filed at 4/21/2020 1407  Gross per 24 hour   Intake 660 ml   Output no documentation   Net 660 ml       Laboratory  Recent Labs     04/19/20  0055 04/20/20  0245 04/21/20  0358   WBC 12.4* 9.0 7.3   RBC 3.51* 3.55* 3.24*   HEMOGLOBIN 10.9* 10.8* 9.9*   HEMATOCRIT 34.0* 33.8* 30.6*   MCV 96.9 95.2 94.4   MCH 31.1 30.4 30.6   MCHC 32.1* 32.0* 32.4*   RDW 55.5* 53.7* 53.6*   PLATELETCT 328 332 311   MPV 9.6 9.2 9.0     Recent Labs     04/19/20  0055 04/20/20  0245 04/21/20  0358   SODIUM 134* 138 136   POTASSIUM 4.4 4.4 5.0   CHLORIDE 99 104 106   CO2 19* 20 21   GLUCOSE 108* 110* 106*   BUN 42* 35* 24*   CREATININE 2.39* 1.55* 1.12   CALCIUM 8.2* 8.2* 7.8*                   Imaging       Assessment/Plan  * Cellulitis of left lower extremity- (present on admission)  Assessment & Plan  Repeat CT scan of the left lower extremity with contrast  Continue  Zyvox for now  Anticipate need to discuss CT results with ID and/or orthopedics  #Arterial ultrasound on 12/27/2019 showed:No evidence of arterial insufficiency in either lower extremity       Venous stasis ulcer of left lower extremity (HCC)- (present on admission)  Assessment & Plan   Wound care input is noted   limb preservation services input is noted  Ortho dr briggs is consulted    Venous stasis dermatitis of both lower extremities- (present on admission)  Assessment & Plan  Consult wound care    CKD (chronic kidney disease) stage 3, GFR 30-59 ml/min ([facility])- (present on admission)  Assessment & Plan  Acute on chronic  At baseline now  Renal US showed:1.  Right kidney appears smaller than left kidney which could be due to atrophy.     2.  No hydronephrosis.     3.  Incidentally noted small right pleural effusion.    Essential hypertension- (present on admission)  Assessment & Plan  Controlled  IV Hydralazine as needed with parameters    Methamphetamine abuse (HCC)- (present on admission)  Assessment & Plan  Positive  UDS       VTE prophylaxis: heparin

## 2020-04-21 NOTE — WOUND TEAM
[facility]  Inpatient Services  Wound and Skin Care Follow-up    Admission Date: 4/16/2020     Last order of IP CONSULT TO WOUND CARE was found on 4/17/2020 from Hospital Encounter on 4/16/2020       HPI, PMH, SH: Reviewed    Unit where seen by Wound Team: [id number]     WOUND CONSULT RELATED TO: LLE    Self Report / Pain Level:  No c/o pain       OBJECTIVE:  Pt lying in bed with Kerlix dressing in place, leg elevated on pillow.      WOUND TYPE, LOCATION, CHARACTERISTICS (Pressure Injuries: location, stage, POA or date identified)       Wound 04/16/20 Venous Ulcer Ankle Medial;Posterior Left -Left Medial/Posterior Ankle (Active)   Site Assessment Yellow;Red;Pink;Boggy 4/21/2020  4:00 PM   Periwound Assessment Excoriated;Denuded;Warm;Painful 4/21/2020  4:00 PM   Margins Undefined edges;Unattached edges 4/21/2020  4:00 PM   Closure Secondary intention 4/21/2020  4:00 PM   Drainage Amount Copious 4/21/2020  4:00 PM   Drainage Description Serosanguineous;Purulent 4/21/2020  4:00 PM   Treatments Cleansed;Irrigation 4/21/2020  4:00 PM   Wound Cleansing Approved Wound Cleanser 4/21/2020  4:00 PM   Periwound Protectant Viscopaste 4/21/2020  4:00 PM   Dressing Cleansing/Solutions Not Applicable 4/21/2020  4:00 PM   Dressing Options Viscopaste;Honey Colloid;Nonadhesive Foam;Absorbent Abdominal Pad;Dry Roll Gauze 4/21/2020  4:00 PM   Dressing Changed Changed 4/21/2020  4:00 PM   Dressing Status Clean;Dry;Intact 4/21/2020  4:00 PM   Dressing Change/Treatment Frequency Every 48 hrs, and As Needed 4/21/2020  4:00 PM   NEXT Dressing Change/Treatment Date 04/23/20 4/21/2020  4:00 PM   NEXT Weekly Photo (Inpatient Only) 04/23/20 4/21/2020  4:00 PM   Non-staged Wound Description Full thickness 4/19/2020  2:15 PM   Wound Bed Slough % - (Post-Procedure) 100 % 4/21/2020  4:00 PM   Tunneling (cm) 0 cm 4/19/2020  2:15 PM   Undermining (cm) 0 cm 4/19/2020  2:15 PM   Shape Irregular 4/21/2020  4:00 PM   Wound Odor Strong;Foul  4/21/2020  4:00 PM   Pulses Doppler 4/19/2020  2:15 PM   Exposed Structures None 4/19/2020  2:15 PM   WOUND NURSE ONLY - Time Spent with Patient (mins) 60 4/21/2020  4:00 PM       Wound 04/16/20 Venous Ulcer Leg Anterior Left -Left Anterior LE (Active)   Wound Image     Site Assessment Boggy;Yellow;Pink 4/21/2020  4:00 PM   Periwound Assessment Maceration;Warm;Excoriated;Denuded 4/21/2020  4:00 PM   Margins Undefined edges 4/21/2020  4:00 PM   Closure Secondary intention 4/21/2020  4:00 PM   Drainage Amount Copious 4/21/2020  4:00 PM   Drainage Description Serosanguineous;Purulent 4/21/2020  4:00 PM   Treatments Cleansed;Irrigation 4/21/2020  4:00 PM   Wound Cleansing Approved Wound Cleanser 4/21/2020  4:00 PM   Periwound Protectant Viscopaste 4/21/2020  4:00 PM   Dressing Cleansing/Solutions Not Applicable 4/21/2020  4:00 PM   Dressing Options Viscopaste;Honey Colloid;Nonadhesive Foam;Dry Roll Gauze 4/21/2020  4:00 PM   Dressing Changed Changed 4/21/2020  4:00 PM   Dressing Status Clean;Dry;Intact 4/21/2020  4:00 PM   Dressing Change/Treatment Frequency Every 48 hrs, and As Needed 4/21/2020  4:00 PM   NEXT Dressing Change/Treatment Date 04/23/20 4/21/2020  4:00 PM   NEXT Weekly Photo (Inpatient Only) 04/23/20 4/21/2020  4:00 PM   Non-staged Wound Description Full thickness 4/19/2020  2:15 PM   Wound Bed Slough (%) 80 % 4/21/2020  4:00 PM   Wound Bed Eschar (%) 50 % 4/17/2020  5:00 PM   Shape Irregular 4/21/2020  4:00 PM   Wound Odor Strong;Foul 4/21/2020  4:00 PM   Pulses 1+;DP 4/17/2020  5:00 PM   Exposed Structures PATITO 4/21/2020  4:00 PM   WOUND NURSE ONLY - Time Spent with Patient (mins) 60 4/21/2020  4:00 PM           Vascular:    BHUPINDER:   No results found.      Lab Values:    Lab Results   Component Value Date/Time    WBC 7.3 04/21/2020 03:58 AM    RBC 3.24 (L) 04/21/2020 03:58 AM    HEMOGLOBIN 9.9 (L) 04/21/2020 03:58 AM    HEMATOCRIT 30.6 (L) 04/21/2020 03:58 AM    CREACTPROT 31.70 (H) 04/16/2020 11:40 AM     CLEMENT 97 (H) 04/16/2020 11:40 AM    HBA1C 5.7 (H) 01/23/2020 11:22 AM          Culture:   Obtained 4/16,   Culture Results show:  Recent Results (from the past 720 hour(s))   CULTURE WOUND W/ GRAM STAIN    Collection Time: 04/16/20  1:10 PM   Result Value Ref Range    Significant Indicator POS (POS)     Source WND     Site LEFT LEG     Culture Result - (A)     Gram Stain Result Rare WBCs.  Moderate Gram positive cocci.       Culture Result (A)      Methicillin Resistant Staphylococcus aureus  Moderate growth      Culture Result (A)      Beta Hemolytic Streptococcus group A  Moderate growth         Susceptibility    Methicillin resistant staphylococcus aureus - SHANON     Azithromycin >4 Resistant mcg/mL     Clindamycin <=0.5 Sensitive mcg/mL     Cefazolin <=8 Resistant mcg/mL     Ceftaroline <=0.5 Sensitive mcg/mL     Daptomycin <=1 Sensitive mcg/mL     Ampicillin/sulbactam <=8/4 Resistant mcg/mL     Erythromycin >4 Resistant mcg/mL     Vancomycin 1 Sensitive mcg/mL     Oxacillin >2 Resistant mcg/mL     Penicillin >8 Resistant mcg/mL     Trimeth/Sulfa <=0.5/9.5 Sensitive mcg/mL     Tetracycline <=4 Sensitive mcg/mL         INTERVENTIONS BY WOUND TEAM:  2 layer compression wrap removed.  Viscopaste patch and overlay taken off along with non-adhesive foam and ABD pads.  Drainage was copious and was dripping off patient's leg.  Used warm soapy washcloth to wipe down patient's left LE and purulent drainage started flowing out of the left LE lateral side.  When this RN assessed the lateral LE, noted 2 additional areas of opening with purulent drainage.  This RN milked the left LE and more purulent drainage came out.  Mulugeta ANGULO notified along with Dr. Ayon.   CT scan ordered by providers, and possible surgical intervention 4/22 or 4/23.   Patient's leg rinsed with NS and patted dry with towel.  Plain packing strip to depth of tract of lateral LE wounds tracting upward towards knee. Viscopaste patch to left LE  and non-adhesive foam to areas with ABD pad over.  Kerlix to secure.            Interdisciplinary consultation: Patient, Primary RN, Mulugeta ANGULO, and Dr. Ayon      EVALUATION:  Mulugeta ANGULO notified along with Dr. Ayon.   CT scan ordered by providers, and possible surgical intervention 4/22 or 4/23.   Wound appear to have maceration surrounding the tissue, will encourage epithelialization and aid in drying out with viscopaste.       Goals: Steady decrease in wound area and depth weekly.    NURSING PLAN OF CARE ORDERS (X):    Dressing changes: See Dressing Care orders: X  Skin care: See Skin Care orders   Rectal tube care: See Rectal Tube Care orders:   Other orders:      WOUND TEAM PLAN OF CARE    Dressing changes by wound team:          Follow up 1-2 times weekly:  X               Follow up 3 times weekly:                NPWT change 3 times weekly:     Follow up as needed:       Other (explain):     Anticipated discharge plans:  LTACH:        SNF/Rehab:                  Home Care:           Outpatient Wound Center:         X   Self Care:

## 2020-04-21 NOTE — DISCHARGE SUMMARY
Discharge Summary    CHIEF COMPLAINT ON ADMISSION  Chief Complaint   Patient presents with   • Wound Check     Pt reports to have left leg wound, being seen at wound care, sent by MD for further care. pt denies fever, but states wounds on leg are draining.        Reason for Admission  Sent by MD; Wound Check     Admission Date  4/16/2020    CODE STATUS  Full Code    HPI & HOSPITAL COURSE  This is a 64 y.o. male who presented 4/16/2020 with left leg redness and swelling.  Patient has no history of venous stasis dermatitis of both legs, and venous stasis ulcers of the left leg.  Patient states that he was discharged from wound care month ago and said the ulcers had resolved.  But then a week ago he noted that ulcers came back he also noticed increasing redness and swelling and pain.  He denied having any fever or chills, cough or congestion, shortness of breath, abdominal pain, nausea or vomiting, or diarrhea.  On review of his previous wound cultures, he has history of MRSA, Pseudomonas, Serratia, and group B streptococcus.    ID was contacted re: his admission. Cultures produced MRSA and GABHS. He was eventually transitioned to Zyvox.  Wound care evaluated the patient and has recommended ongoing dressing changes 1-3 times weekly.  Patient has been referred to outpatient wound care as he prefers outpatient treatment versus home health for ongoing dressing changes.    Patient was independent with ADLs before.  He uses a cane for mobility and will be provided a walker at discharge to assist with mobility needs.  He is otherwise independent with ADLs.  He lives with his daughter and a trailer and she assists with transportation to and from medical appointments and meal preparation.     Therefore, he is discharged in good and stable condition to home with close outpatient follow-up.    The patient met 2-midnight criteria for an inpatient stay at the time of discharge.    Discharge Date  4/21/2020    FOLLOW UP ITEMS POST  DISCHARGE  Wound care clinic on Monday, 4/27/2020  Follow-up with PCP    DISCHARGE DIAGNOSES  Principal Problem:    Cellulitis of left lower extremity POA: Yes  Active Problems:    Venous stasis ulcer of left lower extremity (HCC) POA: Yes      Overview: S/p venous ablation through vascular with improvement in wound healing,       using lymphedema pump and compression    Essential hypertension POA: Yes    CKD (chronic kidney disease) stage 3, GFR 30-59 ml/min (HCC) POA: Yes    Venous stasis dermatitis of both lower extremities POA: Yes    Methamphetamine abuse (HCC) POA: Yes  Resolved Problems:    Hyponatremia POA: Yes      FOLLOW UP  Future Appointments   Date Time Provider Department Center   4/24/2020  3:40 PM Marcela Ch M.D. [facility]     Wound Care Center  [address] [address]  [phone]  On 5/1/2020  for LPS rounds in am to evaluate charcot foot with foot and ankle surgeon      MEDICATIONS ON DISCHARGE     Medication List      Start taking these medications      Instructions   linezolid 600 MG Tabs  Commonly known as:  ZYVOX   Doctor's comments:  First dose 04/21 at 1800  Take 1 Tab by mouth every 12 hours.  Dose:  600 mg     oxyCODONE immediate-release 5 MG Tabs  Commonly known as:  ROXICODONE   Take 1-2 Tabs by mouth every 8 hours as needed for up to 4 days.  Dose:  5-10 mg        Continue taking these medications      Instructions   acetaminophen 500 MG Tabs  Commonly known as:  TYLENOL   Take 1,000 mg by mouth 1 time daily as needed for Moderate Pain.  Dose:  1,000 mg     amitriptyline 25 MG Tabs  Commonly known as:  ELAVIL   Doctor's comments:  Note increased dose  Take 1 Tab by mouth at bedtime as needed for Sleep.  Dose:  25 mg     amLODIPine 10 MG Tabs  Commonly known as:  NORVASC   Take 1 Tab by mouth every day.  Dose:  10 mg     ascorbic acid 500 MG tablet  Commonly known as:  VITAMIN C   Take 1 Tab by mouth every day.  Dose:  500 mg     atorvastatin 40 MG  Tabs  Commonly known as:  Lipitor   Take 1 Tab by mouth every day.  Dose:  40 mg     ferrous gluconate 324 (38 Fe) MG Tabs  Commonly known as:  FERGON   Take 1 Tab by mouth every morning with breakfast.  Dose:  324 mg     sertraline 100 MG Tabs  Commonly known as:  ZOLOFT   Doctor's comments:  To replace celexa  Take 1 Tab by mouth every day.  Dose:  100 mg            Allergies  Allergies   Allergen Reactions   • Norco [Hydrocodone-Acetaminophen] Itching       DIET  Orders Placed This Encounter   Procedures   • Diet Order Regular     Standing Status:   Standing     Number of Occurrences:   1     Order Specific Question:   Diet:     Answer:   Regular [1]       ACTIVITY  As tolerated.  Weight bearing as tolerated    CONSULTATIONS  Infectious disease  Wound care    PROCEDURES  None    LABORATORY  Lab Results   Component Value Date    SODIUM 136 04/21/2020    POTASSIUM 5.0 04/21/2020    CHLORIDE 106 04/21/2020    CO2 21 04/21/2020    GLUCOSE 106 (H) 04/21/2020    BUN 24 (H) 04/21/2020    CREATININE 1.12 04/21/2020        Lab Results   Component Value Date    WBC 7.3 04/21/2020    HEMOGLOBIN 9.9 (L) 04/21/2020    HEMATOCRIT 30.6 (L) 04/21/2020    PLATELETCT 311 04/21/2020        Total time of the discharge process exceeds 38 minutes.

## 2020-04-21 NOTE — CARE PLAN
Problem: Safety  Goal: Will remain free from falls  Outcome: PROGRESSING AS EXPECTED  Fall precautions in place, pt education provided, bed is alarmed. Call light within reach.     Problem: Infection  Goal: Will remain free from infection  Outcome: PROGRESSING AS EXPECTED  Contact precautions in place, hand hygiene in use, PO zyvox q12hrs per orders.

## 2020-04-21 NOTE — DISCHARGE PLANNING
Care Transition Team Assessment    RN DEEPIKA spoke with patient over the phone to complete transition assessment.  Patient confirmed that his address, phone numbers, emergency contact, PCP, and insurance information were all correct on the face sheet.  Patient states he has been independent w/ his ADLs prior to admission and that on discharge daughter will come pick him up.      DC Plan:  Home w/ assistance; noted in MD progress note that patient may need OP wound care services.  Will follow up with MD at rounds and if that is the case, will discuss home health for wound care with the patient.      Information Source  Orientation : Oriented x 4  Information Given By: Patient  Who is responsible for making decisions for patient? : Patient    Readmission Evaluation  Is this a readmission?: No    Elopement Risk  Legal Hold: No  Ambulatory or Self Mobile in Wheelchair: Yes  Disoriented: No  Psychiatric Symptoms: None  History of Wandering: No  Elopement this Admit: No  Vocalizing Wanting to Leave: No  Displays Behaviors, Body Language Wanting to Leave: No-Not at Risk for Elopement  Elopement Risk: Not at Risk for Elopement    Interdisciplinary Discharge Planning  Primary Care Physician: Dr. Marcela Ch  Lives with - Patient's Self Care Capacity: Adult Children  Patient or legal guardian wants to designate a caregiver (see row info): No  Support Systems: Children  Housing / Facility: 1 Story House  Do You Take your Prescribed Medications Regularly: Yes  Able to Return to Previous ADL's: Yes  Mobility Issues: No  Prior Services: None  Patient Expects to be Discharged to:: Home  Assistance Needed: No  Durable Medical Equipment: Other - Specify    Discharge Preparedness  What is your plan after discharge?: Home with help  What are your discharge supports?: Child  Prior Functional Level: Independent with Activities of Daily Living, Independent with Medication Management  Difficulity with ADLs: None  Difficulity with IADLs:  None    Functional Assesment  Prior Functional Level: Independent with Activities of Daily Living, Independent with Medication Management    Finances  Financial Barriers to Discharge: No  Prescription Coverage: Yes    Vision / Hearing Impairment  Hearing Impairment : Yes  Hearing Impairment: Both Ears  Does Pt Need Special Equipment for the Hearing Impaired?: No         Advance Directive  Advance Directive?: None  Advance Directive offered?: AD Booklet refused    Domestic Abuse  Have you ever been the victim of abuse or violence?: No  Physical Abuse or Sexual Abuse: No  Verbal Abuse or Emotional Abuse: No  Possible Abuse Reported to:: Not Applicable    Psychological Assessment  History of Substance Abuse: Alcohol  Date Last Used - Alcohol: 2015  History of Psychiatric Problems: No    Discharge Risks or Barriers  Discharge risks or barriers?: No    Anticipated Discharge Information  Anticipated discharge disposition: Home  Discharge Address: [facility] [address], [place]  [address]  Discharge Contact Phone Number: [phone]

## 2020-04-21 NOTE — PROGRESS NOTES
Assumed care at 1915. Pt is resting in bed, watching tv, pt denies current needs. No signs of distress, pt assessed.  Bed in lowest locked position, bed is alarmed, call light in reach. Safety precautions in place.

## 2020-04-21 NOTE — CARE PLAN
Problem: Communication  Goal: The ability to communicate needs accurately and effectively will improve  Outcome: PROGRESSING AS EXPECTED     Problem: Safety  Goal: Will remain free from injury this shift  Outcome: PROGRESSING AS EXPECTED  Goal: Will remain free from falls  Outcome: PROGRESSING AS EXPECTED

## 2020-04-22 ENCOUNTER — ANESTHESIA EVENT (OUTPATIENT)
Dept: SURGERY | Facility: MEDICAL CENTER | Age: 65
DRG: 571 | End: 2020-04-22
Payer: MEDICAID

## 2020-04-22 ENCOUNTER — ANESTHESIA (OUTPATIENT)
Dept: SURGERY | Facility: MEDICAL CENTER | Age: 65
DRG: 571 | End: 2020-04-22
Payer: MEDICAID

## 2020-04-22 LAB
ALBUMIN SERPL BCP-MCNC: 2.4 G/DL (ref 3.2–4.9)
ALBUMIN/GLOB SERPL: 0.6 G/DL
ALP SERPL-CCNC: 114 U/L (ref 30–99)
ALT SERPL-CCNC: 20 U/L (ref 2–50)
ANION GAP SERPL CALC-SCNC: 10 MMOL/L (ref 7–16)
AST SERPL-CCNC: 23 U/L (ref 12–45)
BASOPHILS # BLD AUTO: 0.4 % (ref 0–1.8)
BASOPHILS # BLD: 0.03 K/UL (ref 0–0.12)
BILIRUB SERPL-MCNC: 0.5 MG/DL (ref 0.1–1.5)
BUN SERPL-MCNC: 20 MG/DL (ref 8–22)
CALCIUM SERPL-MCNC: 7.6 MG/DL (ref 8.5–10.5)
CHLORIDE SERPL-SCNC: 103 MMOL/L (ref 96–112)
CO2 SERPL-SCNC: 22 MMOL/L (ref 20–33)
CREAT SERPL-MCNC: 1.12 MG/DL (ref 0.5–1.4)
CRP SERPL HS-MCNC: 14.44 MG/DL (ref 0–0.75)
EOSINOPHIL # BLD AUTO: 0.11 K/UL (ref 0–0.51)
EOSINOPHIL NFR BLD: 1.5 % (ref 0–6.9)
ERYTHROCYTE [DISTWIDTH] IN BLOOD BY AUTOMATED COUNT: 53.8 FL (ref 35.9–50)
ERYTHROCYTE [SEDIMENTATION RATE] IN BLOOD BY WESTERGREN METHOD: >120 MM/HOUR (ref 0–20)
GLOBULIN SER CALC-MCNC: 4.3 G/DL (ref 1.9–3.5)
GLUCOSE SERPL-MCNC: 103 MG/DL (ref 65–99)
GRAM STN SPEC: NORMAL
HCT VFR BLD AUTO: 30.7 % (ref 42–52)
HGB BLD-MCNC: 10 G/DL (ref 14–18)
IMM GRANULOCYTES # BLD AUTO: 0.08 K/UL (ref 0–0.11)
IMM GRANULOCYTES NFR BLD AUTO: 1.1 % (ref 0–0.9)
IRON SATN MFR SERPL: 21 % (ref 15–55)
IRON SERPL-MCNC: 27 UG/DL (ref 50–180)
LYMPHOCYTES # BLD AUTO: 0.87 K/UL (ref 1–4.8)
LYMPHOCYTES NFR BLD: 12 % (ref 22–41)
MAGNESIUM SERPL-MCNC: 2 MG/DL (ref 1.5–2.5)
MCH RBC QN AUTO: 30.7 PG (ref 27–33)
MCHC RBC AUTO-ENTMCNC: 32.6 G/DL (ref 33.7–35.3)
MCV RBC AUTO: 94.2 FL (ref 81.4–97.8)
MONOCYTES # BLD AUTO: 0.81 K/UL (ref 0–0.85)
MONOCYTES NFR BLD AUTO: 11.2 % (ref 0–13.4)
NEUTROPHILS # BLD AUTO: 5.34 K/UL (ref 1.82–7.42)
NEUTROPHILS NFR BLD: 73.8 % (ref 44–72)
NRBC # BLD AUTO: 0 K/UL
NRBC BLD-RTO: 0 /100 WBC
PHOSPHATE SERPL-MCNC: 2.9 MG/DL (ref 2.5–4.5)
PLATELET # BLD AUTO: 312 K/UL (ref 164–446)
PMV BLD AUTO: 8.8 FL (ref 9–12.9)
POTASSIUM SERPL-SCNC: 5 MMOL/L (ref 3.6–5.5)
PROCALCITONIN SERPL-MCNC: 0.21 NG/ML
PROT SERPL-MCNC: 6.7 G/DL (ref 6–8.2)
RBC # BLD AUTO: 3.26 M/UL (ref 4.7–6.1)
SIGNIFICANT IND 70042: NORMAL
SITE SITE: NORMAL
SODIUM SERPL-SCNC: 135 MMOL/L (ref 135–145)
SOURCE SOURCE: NORMAL
TIBC SERPL-MCNC: 131 UG/DL (ref 250–450)
UIBC SERPL-MCNC: 104 UG/DL (ref 110–370)
VIT B12 SERPL-MCNC: 1374 PG/ML (ref 211–911)
WBC # BLD AUTO: 7.2 K/UL (ref 4.8–10.8)

## 2020-04-22 PROCEDURE — 700105 HCHG RX REV CODE 258: Performed by: STUDENT IN AN ORGANIZED HEALTH CARE EDUCATION/TRAINING PROGRAM

## 2020-04-22 PROCEDURE — 770021 HCHG ROOM/CARE - ISO PRIVATE

## 2020-04-22 PROCEDURE — 84100 ASSAY OF PHOSPHORUS: CPT

## 2020-04-22 PROCEDURE — 87205 SMEAR GRAM STAIN: CPT

## 2020-04-22 PROCEDURE — 700102 HCHG RX REV CODE 250 W/ 637 OVERRIDE(OP)

## 2020-04-22 PROCEDURE — 84425 ASSAY OF VITAMIN B-1: CPT

## 2020-04-22 PROCEDURE — 99233 SBSQ HOSP IP/OBS HIGH 50: CPT | Mod: GC | Performed by: INTERNAL MEDICINE

## 2020-04-22 PROCEDURE — 700111 HCHG RX REV CODE 636 W/ 250 OVERRIDE (IP): Performed by: STUDENT IN AN ORGANIZED HEALTH CARE EDUCATION/TRAINING PROGRAM

## 2020-04-22 PROCEDURE — 83550 IRON BINDING TEST: CPT

## 2020-04-22 PROCEDURE — 700102 HCHG RX REV CODE 250 W/ 637 OVERRIDE(OP): Performed by: FAMILY MEDICINE

## 2020-04-22 PROCEDURE — 160038 HCHG SURGERY MINUTES - EA ADDL 1 MIN LEVEL 2: Performed by: ORTHOPAEDIC SURGERY

## 2020-04-22 PROCEDURE — 87075 CULTR BACTERIA EXCEPT BLOOD: CPT

## 2020-04-22 PROCEDURE — 0J8P0ZZ DIVISION OF LEFT LOWER LEG SUBCUTANEOUS TISSUE AND FASCIA, OPEN APPROACH: ICD-10-PCS | Performed by: ORTHOPAEDIC SURGERY

## 2020-04-22 PROCEDURE — 83540 ASSAY OF IRON: CPT

## 2020-04-22 PROCEDURE — 501330 HCHG SET, CYSTO IRRIG TUBING: Performed by: ORTHOPAEDIC SURGERY

## 2020-04-22 PROCEDURE — 84145 PROCALCITONIN (PCT): CPT

## 2020-04-22 PROCEDURE — 85025 COMPLETE CBC W/AUTO DIFF WBC: CPT

## 2020-04-22 PROCEDURE — 160009 HCHG ANES TIME/MIN: Performed by: ORTHOPAEDIC SURGERY

## 2020-04-22 PROCEDURE — 700101 HCHG RX REV CODE 250: Performed by: ANESTHESIOLOGY

## 2020-04-22 PROCEDURE — 82607 VITAMIN B-12: CPT

## 2020-04-22 PROCEDURE — A9270 NON-COVERED ITEM OR SERVICE: HCPCS | Performed by: FAMILY MEDICINE

## 2020-04-22 PROCEDURE — 99232 SBSQ HOSP IP/OBS MODERATE 35: CPT | Performed by: INTERNAL MEDICINE

## 2020-04-22 PROCEDURE — A6550 NEG PRES WOUND THER DRSG SET: HCPCS | Performed by: ORTHOPAEDIC SURGERY

## 2020-04-22 PROCEDURE — 80053 COMPREHEN METABOLIC PANEL: CPT

## 2020-04-22 PROCEDURE — 700105 HCHG RX REV CODE 258: Performed by: ANESTHESIOLOGY

## 2020-04-22 PROCEDURE — 85652 RBC SED RATE AUTOMATED: CPT

## 2020-04-22 PROCEDURE — 500881 HCHG PACK, EXTREMITY: Performed by: ORTHOPAEDIC SURGERY

## 2020-04-22 PROCEDURE — 83735 ASSAY OF MAGNESIUM: CPT

## 2020-04-22 PROCEDURE — 160002 HCHG RECOVERY MINUTES (STAT): Performed by: ORTHOPAEDIC SURGERY

## 2020-04-22 PROCEDURE — 87077 CULTURE AEROBIC IDENTIFY: CPT | Mod: 91

## 2020-04-22 PROCEDURE — 160035 HCHG PACU - 1ST 60 MINS PHASE I: Performed by: ORTHOPAEDIC SURGERY

## 2020-04-22 PROCEDURE — 700111 HCHG RX REV CODE 636 W/ 250 OVERRIDE (IP): Performed by: ANESTHESIOLOGY

## 2020-04-22 PROCEDURE — 160048 HCHG OR STATISTICAL LEVEL 1-5: Performed by: ORTHOPAEDIC SURGERY

## 2020-04-22 PROCEDURE — 0JBP0ZZ EXCISION OF LEFT LOWER LEG SUBCUTANEOUS TISSUE AND FASCIA, OPEN APPROACH: ICD-10-PCS | Performed by: ORTHOPAEDIC SURGERY

## 2020-04-22 PROCEDURE — 86140 C-REACTIVE PROTEIN: CPT

## 2020-04-22 PROCEDURE — 700111 HCHG RX REV CODE 636 W/ 250 OVERRIDE (IP): Performed by: FAMILY MEDICINE

## 2020-04-22 PROCEDURE — 87070 CULTURE OTHR SPECIMN AEROBIC: CPT

## 2020-04-22 PROCEDURE — 87186 SC STD MICRODIL/AGAR DIL: CPT

## 2020-04-22 PROCEDURE — A9270 NON-COVERED ITEM OR SERVICE: HCPCS

## 2020-04-22 PROCEDURE — 160036 HCHG PACU - EA ADDL 30 MINS PHASE I: Performed by: ORTHOPAEDIC SURGERY

## 2020-04-22 PROCEDURE — 99232 SBSQ HOSP IP/OBS MODERATE 35: CPT | Performed by: NURSE PRACTITIONER

## 2020-04-22 PROCEDURE — 36415 COLL VENOUS BLD VENIPUNCTURE: CPT

## 2020-04-22 PROCEDURE — 160027 HCHG SURGERY MINUTES - 1ST 30 MINS LEVEL 2: Performed by: ORTHOPAEDIC SURGERY

## 2020-04-22 RX ORDER — DIPHENHYDRAMINE HYDROCHLORIDE 50 MG/ML
12.5 INJECTION INTRAMUSCULAR; INTRAVENOUS
Status: DISCONTINUED | OUTPATIENT
Start: 2020-04-22 | End: 2020-04-22 | Stop reason: HOSPADM

## 2020-04-22 RX ORDER — HYDRALAZINE HYDROCHLORIDE 20 MG/ML
5 INJECTION INTRAMUSCULAR; INTRAVENOUS
Status: DISCONTINUED | OUTPATIENT
Start: 2020-04-22 | End: 2020-04-22 | Stop reason: HOSPADM

## 2020-04-22 RX ORDER — KETAMINE HYDROCHLORIDE 50 MG/ML
INJECTION, SOLUTION INTRAMUSCULAR; INTRAVENOUS PRN
Status: DISCONTINUED | OUTPATIENT
Start: 2020-04-22 | End: 2020-04-22 | Stop reason: SURG

## 2020-04-22 RX ORDER — DEXAMETHASONE SODIUM PHOSPHATE 4 MG/ML
INJECTION, SOLUTION INTRA-ARTICULAR; INTRALESIONAL; INTRAMUSCULAR; INTRAVENOUS; SOFT TISSUE PRN
Status: DISCONTINUED | OUTPATIENT
Start: 2020-04-22 | End: 2020-04-22 | Stop reason: SURG

## 2020-04-22 RX ORDER — HALOPERIDOL 5 MG/ML
1 INJECTION INTRAMUSCULAR
Status: DISCONTINUED | OUTPATIENT
Start: 2020-04-22 | End: 2020-04-22 | Stop reason: HOSPADM

## 2020-04-22 RX ORDER — SODIUM CHLORIDE, SODIUM LACTATE, POTASSIUM CHLORIDE, CALCIUM CHLORIDE 600; 310; 30; 20 MG/100ML; MG/100ML; MG/100ML; MG/100ML
INJECTION, SOLUTION INTRAVENOUS CONTINUOUS
Status: DISCONTINUED | OUTPATIENT
Start: 2020-04-22 | End: 2020-04-22 | Stop reason: HOSPADM

## 2020-04-22 RX ORDER — MEPERIDINE HYDROCHLORIDE 25 MG/ML
12.5 INJECTION INTRAMUSCULAR; INTRAVENOUS; SUBCUTANEOUS
Status: DISCONTINUED | OUTPATIENT
Start: 2020-04-22 | End: 2020-04-22 | Stop reason: HOSPADM

## 2020-04-22 RX ORDER — ONDANSETRON 2 MG/ML
INJECTION INTRAMUSCULAR; INTRAVENOUS PRN
Status: DISCONTINUED | OUTPATIENT
Start: 2020-04-22 | End: 2020-04-22 | Stop reason: SURG

## 2020-04-22 RX ORDER — SODIUM CHLORIDE, SODIUM LACTATE, POTASSIUM CHLORIDE, CALCIUM CHLORIDE 600; 310; 30; 20 MG/100ML; MG/100ML; MG/100ML; MG/100ML
INJECTION, SOLUTION INTRAVENOUS
Status: DISCONTINUED | OUTPATIENT
Start: 2020-04-22 | End: 2020-04-22 | Stop reason: SURG

## 2020-04-22 RX ORDER — CEFAZOLIN SODIUM 1 G/3ML
INJECTION, POWDER, FOR SOLUTION INTRAMUSCULAR; INTRAVENOUS PRN
Status: DISCONTINUED | OUTPATIENT
Start: 2020-04-22 | End: 2020-04-22 | Stop reason: SURG

## 2020-04-22 RX ORDER — LABETALOL HYDROCHLORIDE 5 MG/ML
5 INJECTION, SOLUTION INTRAVENOUS
Status: DISCONTINUED | OUTPATIENT
Start: 2020-04-22 | End: 2020-04-22 | Stop reason: HOSPADM

## 2020-04-22 RX ADMIN — POVIDONE-IODINE: 10 SOLUTION TOPICAL at 11:30

## 2020-04-22 RX ADMIN — FENTANYL CITRATE 50 MCG: 50 INJECTION, SOLUTION INTRAMUSCULAR; INTRAVENOUS at 12:04

## 2020-04-22 RX ADMIN — FENTANYL CITRATE 50 MCG: 50 INJECTION, SOLUTION INTRAMUSCULAR; INTRAVENOUS at 11:41

## 2020-04-22 RX ADMIN — MEPERIDINE HYDROCHLORIDE 12.5 MG: 25 INJECTION INTRAMUSCULAR; INTRAVENOUS; SUBCUTANEOUS at 13:00

## 2020-04-22 RX ADMIN — DEXAMETHASONE SODIUM PHOSPHATE 4 MG: 4 INJECTION, SOLUTION INTRA-ARTICULAR; INTRALESIONAL; INTRAMUSCULAR; INTRAVENOUS; SOFT TISSUE at 11:48

## 2020-04-22 RX ADMIN — FENTANYL CITRATE 50 MCG: 50 INJECTION, SOLUTION INTRAMUSCULAR; INTRAVENOUS at 13:05

## 2020-04-22 RX ADMIN — ATORVASTATIN CALCIUM 40 MG: 40 TABLET, FILM COATED ORAL at 05:26

## 2020-04-22 RX ADMIN — KETAMINE HYDROCHLORIDE 50 MG: 50 INJECTION INTRAMUSCULAR; INTRAVENOUS at 11:41

## 2020-04-22 RX ADMIN — CEFAZOLIN 2 G: 330 INJECTION, POWDER, FOR SOLUTION INTRAMUSCULAR; INTRAVENOUS at 11:48

## 2020-04-22 RX ADMIN — FENTANYL CITRATE 50 MCG: 50 INJECTION, SOLUTION INTRAMUSCULAR; INTRAVENOUS at 12:55

## 2020-04-22 RX ADMIN — OXYCODONE 5 MG: 5 TABLET ORAL at 23:29

## 2020-04-22 RX ADMIN — LINEZOLID 600 MG: 600 TABLET, FILM COATED ORAL at 05:26

## 2020-04-22 RX ADMIN — SODIUM CHLORIDE, POTASSIUM CHLORIDE, SODIUM LACTATE AND CALCIUM CHLORIDE: 600; 310; 30; 20 INJECTION, SOLUTION INTRAVENOUS at 13:15

## 2020-04-22 RX ADMIN — SERTRALINE HYDROCHLORIDE 100 MG: 100 TABLET ORAL at 05:25

## 2020-04-22 RX ADMIN — HEPARIN SODIUM 5000 UNITS: 5000 INJECTION, SOLUTION INTRAVENOUS; SUBCUTANEOUS at 01:48

## 2020-04-22 RX ADMIN — ONDANSETRON 4 MG: 2 INJECTION INTRAMUSCULAR; INTRAVENOUS at 12:10

## 2020-04-22 RX ADMIN — OXYCODONE 10 MG: 5 TABLET ORAL at 16:26

## 2020-04-22 RX ADMIN — MORPHINE SULFATE 4 MG: 4 INJECTION INTRAVENOUS at 14:23

## 2020-04-22 RX ADMIN — SODIUM CHLORIDE, POTASSIUM CHLORIDE, SODIUM LACTATE AND CALCIUM CHLORIDE: 600; 310; 30; 20 INJECTION, SOLUTION INTRAVENOUS at 11:11

## 2020-04-22 RX ADMIN — OXYCODONE 5 MG: 5 TABLET ORAL at 01:48

## 2020-04-22 RX ADMIN — HALOPERIDOL LACTATE 1 MG: 5 INJECTION, SOLUTION INTRAMUSCULAR at 12:55

## 2020-04-22 RX ADMIN — AMPICILLIN SODIUM AND SULBACTAM SODIUM 3 G: 2; 1 INJECTION, POWDER, FOR SOLUTION INTRAMUSCULAR; INTRAVENOUS at 17:29

## 2020-04-22 RX ADMIN — PROPOFOL 140 MG: 10 INJECTION, EMULSION INTRAVENOUS at 11:41

## 2020-04-22 RX ADMIN — LINEZOLID 600 MG: 600 TABLET, FILM COATED ORAL at 17:28

## 2020-04-22 RX ADMIN — ROCURONIUM BROMIDE 50 MG: 10 INJECTION, SOLUTION INTRAVENOUS at 11:42

## 2020-04-22 RX ADMIN — AMPICILLIN SODIUM AND SULBACTAM SODIUM 3 G: 2; 1 INJECTION, POWDER, FOR SOLUTION INTRAMUSCULAR; INTRAVENOUS at 23:29

## 2020-04-22 ASSESSMENT — ENCOUNTER SYMPTOMS
SPUTUM PRODUCTION: 0
WEIGHT LOSS: 0
VOMITING: 0
HEMOPTYSIS: 0
HEADACHES: 0
TINGLING: 0
DIZZINESS: 0
EYE PAIN: 0
NECK PAIN: 0
SHORTNESS OF BREATH: 0
DOUBLE VISION: 0
PALPITATIONS: 0
ORTHOPNEA: 0
BACK PAIN: 0
CHILLS: 0
PHOTOPHOBIA: 0
MYALGIAS: 0
NAUSEA: 0
ABDOMINAL PAIN: 0

## 2020-04-22 NOTE — ASSESSMENT & PLAN NOTE
Likely anemia of chronic disease and in the setting of slow blood loss from his LE wound, which has now improved.   No active signs of bleeding.    Patient is hemodynamically stable and asymptomatic  Will continue to trend with CBC  s/p 1U PRBC on 4/29, 5/5, 5/16  Continue iron supplementation  Transfuse to maintain hemoglobin greater than 7.  Results from last 7 days   Lab Units 05/18/20  0245 05/17/20  0253 05/16/20  1003 05/16/20  0110   HGB 1503 g/dL 7.5* 7.9* 8.1* 6.8*   HCT 1504 % 22.9* 24.4* 25.1* 21.0*   MCV 1505 fL 93.1 94.6 95.8 95.9     Folate -Folic Acid   Date Value Ref Range Status   12/28/2019 19.6 >4.0 ng/mL Final     Vitamin B12 -True Cobalamin   Date Value Ref Range Status   04/22/2020 1374 (H) 211 - 911 pg/mL Final     Reticulocyte Count   Date Value Ref Range Status   12/28/2019 2.1 0.8 - 2.1 % Final     Retic, Absolute   Date Value Ref Range Status   12/28/2019 0.08 (H) 0.04 - 0.06 M/uL Final     Imm. Reticulocyte Fraction   Date Value Ref Range Status   12/28/2019 24.4 (H) 9.3 - 17.4 % Final     Retic Hgb Equivalent   Date Value Ref Range Status   12/28/2019 29.2 29.0 - 35.0 pg/cell Final      Ferritin   Date Value Ref Range Status   12/28/2019 77.7 22.0 - 322.0 ng/mL Final     Iron   Date Value Ref Range Status   04/22/2020 27 (L) 50 - 180 ug/dL Final     Total Iron Binding   Date Value Ref Range Status   04/22/2020 131 (L) 250 - 450 ug/dL Final     % Saturation   Date Value Ref Range Status   04/22/2020 21 15 - 55 % Final

## 2020-04-22 NOTE — CARE PLAN
Problem: Safety  Goal: Will remain free from falls  Outcome: PROGRESSING AS EXPECTED  Bed alarm on. Bed locked in the lowest position. Pt educated to use call light for assistance out of bed. Room free of clutter.      Problem: Pain Management  Goal: Pain level will decrease to patient's comfort goal  Outcome: PROGRESSING AS EXPECTED   PRN pain medications available for Pt. LLE repositioned for comfort.

## 2020-04-22 NOTE — PROGRESS NOTES
LIMB PRESERVATION SERVICE  PROGRESS NOTE    HPI: 64 y.o.  with a past medical history that includes hypertension, hepatitis C, kidney disease, polysubstance abuse, nondiabetic Charcot's deformity left foot, and chronic venous stasis ulcers bilateral lower extremities admitted 4/16/2020 for Cellulitis of left lower extremity.       LPS has been consulted for evaluation of left lower leg venous stasis ulcers.  The patient has a history of chronic venous stasis ulcers and has been seen in outpatient wound clinic for treatment as well as infectious disease in October 2019 for an infected skin ulcer.  He reports that his previous venostasis ulcers had resolved approximately 1 month ago and he was discharged from the outpatient wound clinic.  He states that 1 week ago (4/12/2020) he was in the shower and noticed that he developed an ulcer on the anterior aspect of his lower leg distal to his knee and then the ulcer had continued to worsen from there radiating to the back of his left lower leg and development of new ulcers down his left lateral lower leg to his ankle.  He reports having fever, chills, and a large amount of clear drainage from his ulcers.  He states that 4/15/2020 his left lower leg began to swell and developed redness.  He was seen in the outpatient wound center where he was found to have significant swelling and erythema to his left lower extremity, weeping from superficial tissue, and foul-smelling odor.    Patient proceeded to ED.  ESR 97, CRP 31.7.  He was started on vancomycin and Zosyn IV and admitted under hospital services.         4/22/2020: Patient denies fevers, chills, nausea, vomiting.  Complains of pain with inspection of lower leg ulcers.  Tract with purulent drainage noted yesterday by wound team.  Repeat CT ordered and was negative for abscess or osteomyelitis.  Patient has been n.p.o. since last night.  Dressings that were changed yesterday are completely saturated today.   Patient also  has Charcot foot to left foot and complains of severe pain to foot when walking.      PERTINENT LPS RESULTS:     CT left lower leg 4/21/2020:  1. No evidence of osteomyelitis.  2. Cellulitis and soft tissue organs of the left lower leg. Subcutaneous air without drainable abscess.  3. Suspected pes planovalgus deformity. Multifocal osteoarthrosis of the midfoot      EXAM:      /80   Pulse 76   Temp 36.9 °C (98.5 °F) (Temporal)   Resp 18   Ht 1.829 m (6')   Wt 83.6 kg (184 lb 4.9 oz)   SpO2 100%   BMI 25.00 kg/m²     Pedal Pulses: Palpable DP/PT bilaterally  Sensation: Diminished to plantar feet, intact to hindfoot only  Mycotic, sharp jagged toenails    Wound :  Left medial distal lower leg near ankle: 100% scattered slough, superficial, severely tender    Left proximal anterior lower leg below knee: 100% scattered slough, denuded tissue, tender to touch.  +3 edema, positive for erythema    Left lateral lower leg ulcers x2:  Loose purulent tinged slough.  Medial tract extends 13 cm proximally towards 12:00  Lateral tract extends 5 cm proximately towards 12:00  Severe edema and erythema to leg    Wound Care:   Completed by APRN.  Applied single layer Xeroform, roll gauze.  Going to surgery later today.        DIABETES    Does not have diabetes.  Last A1c listed below.  A1c:   Lab Results   Component Value Date/Time    HBA1C 5.7 (H) 01/23/2020 11:22 AM            INFECTION MANAGEMENT:    Results from last 7 days   Lab Units 04/22/20  0049 04/21/20  0358 04/20/20  0245 04/19/20  0055 04/18/20  0040 04/17/20  0023   WBC 1501 K/uL 7.2 7.3 9.0 12.4* 16.2* 12.7*   PLATELET COUNT 1518 K/uL 312 311 332 328 266 225     Wound culture results:   Results     Procedure Component Value Units Date/Time    BLOOD CULTURE [[id number]] Collected:  04/16/20 1246    Order Status:  Completed Specimen:  Blood from Peripheral Updated:  04/21/20 1500     Significant Indicator NEG     Source BLD     Site PERIPHERAL     Culture  "Result No growth after 5 days of incubation.    Narrative:       Per Hospital Policy: Only change Specimen Src: to \"Line\" if  specified by physician order.  Left Forearm/Arm    BLOOD CULTURE [[id number]] Collected:  04/16/20 1140    Order Status:  Completed Specimen:  Blood from Peripheral Updated:  04/21/20 1300     Significant Indicator NEG     Source BLD     Site PERIPHERAL     Culture Result No growth after 5 days of incubation.    Narrative:       Per Hospital Policy: Only change Specimen Src: to \"Line\" if  specified by physician order.  No site indicated    CULTURE WOUND W/ GRAM STAIN [[id number]]  (Abnormal)  (Susceptibility) Collected:  04/16/20 1310    Order Status:  Completed Specimen:  Wound from Left Leg Updated:  04/18/20 0831     Significant Indicator POS     Source WND     Site LEFT LEG     Culture Result -     Gram Stain Result Rare WBCs.  Moderate Gram positive cocci.       Culture Result Methicillin Resistant Staphylococcus aureus  Moderate growth        Beta Hemolytic Streptococcus group A  Moderate growth      Narrative:       CALL  Rivas  MS5 tel. [phone],  CALLED  MS5 tel. [phone] 04/17/2020, 13:16, RB PERF. RESULTS CALLED  TO:[id number] RN    Susceptibility     Methicillin resistant staphylococcus aureus (1)     Antibiotic Interpretation Microscan Method Status    Azithromycin Resistant >4 mcg/mL SHANON Final    Clindamycin Sensitive <=0.5 mcg/mL SHANON Final    Cefazolin Resistant <=8 mcg/mL SHANON Final    Ceftaroline Sensitive <=0.5 mcg/mL SHANON Final    Daptomycin Sensitive <=1 mcg/mL SHANON Final    Ampicillin/sulbactam Resistant <=8/4 mcg/mL SHANON Final    Erythromycin Resistant >4 mcg/mL SHANON Final    Vancomycin Sensitive 1 mcg/mL SHANON Final    Oxacillin Resistant >2 mcg/mL SHANON Final    Penicillin Resistant >8 mcg/mL SHANON Final    Trimeth/Sulfa Sensitive <=0.5/9.5 mcg/mL SHANON Final    Tetracycline Sensitive <=4 mcg/mL SHANON Final                   GRAM STAIN [[id number]] Collected:  04/16/20 1310    Order " Status:  Completed Specimen:  Wound Updated:  04/16/20 1509     Significant Indicator .     Source WND     Site LEFT LEG     Gram Stain Result Rare WBCs.  Moderate Gram positive cocci.                       ASSESSMENT/PLAN:   LLE scattered ulcerations with worsening wound infection.  2 tracts to left lateral leg tracking proximally 13 cm and 5 cm.  Repeat CT negative for abscess or osteomyelitis however there was purulent drainage, coming from the wounds and tracts.  Too tender to debride at bedside.  Recommend LLE I&D with deep cultures in OR.  Discussed case with Dr. Israel.  See below.        Wound care:   -New order to be placed postop.  Wound team to continue to follow.  -Nail consult placed for wound team      Labs/Imaging:  -no further labs or imaging at this time    Vascular status:   -Palpable pedal pulses bilaterally    Surgery:   -last ate and drank fluids at dinner last night.    N.p.o. ordered earlier this a.m.  - Plan for left lower leg I&D with deep cultures and wound VAC placement by Dr. Israel    -Once left lower extremity ulcers and infection have healed, anticipate evaluation for left foot reconstruction.     Antibiotics:   -Currently on linezolid.  ID previously involved but has signed off.  Hospitalist KEV Pacheco to update ID on patient condition.    Weight Bearing Status:   -Weight bearing as tolerated    Offloading:   -Evaluate patient for protective footwear postop,     PT/OT :   -Consulted.  To see patient postop.    Neuropathy  - Implications of loss of protective sensation (LOPS) discussed with patient- including increased risk for amputation.  Advised to check feet at least daily, moisturize feet, and to always wear protective foot wear.   -avoid trimming own nails. See podiatrist or certified foot and nail RN          DISCHARGE PLAN:    Disposition: Recommend SNF depending on surgical outcome.  Has previously been followed at [facility] care clinic and home  health.    Follow-up: LPS rounds in Wound Clinic.  On 5/1/2020 to examine left lower leg ulcers and left Charcot foot/fixed hammertoes      Discussed with: shantal, RN, Dr. Israel, Guerrero ANGULO hospitalist, Abby Valdes RN wound team    Please note that this dictation was created using voice recognition software. I have  worked with technical experts from [employer] to optimize the interface.  I have made every reasonable attempt to correct obvious errors, but there may be errors of grammar and possibly content that I did not discover before finalizing the note.    Abeba Hernández, A.P.R.N.    If any questions or concerns, please call [phone]

## 2020-04-22 NOTE — ANESTHESIA PREPROCEDURE EVALUATION
Relevant Problems   NEURO   (+) History of prediabetes      CARDIAC   (+) Chronic migraine without aura without status migrainosus, not intractable   (+) Essential hypertension   (+) Pulmonary hypertension (HCC)      GI   (+) Erosive gastritis   (+) Gastroesophageal reflux disease with esophagitis         (+) CKD (chronic kidney disease) stage 3, GFR 30-59 ml/min (HCC)   (+) Chronic hepatitis C without hepatic coma (HCC)      Other   (+) Charcot's joint of left foot, non-diabetic     Anes H&P:  PAST MEDICAL HISTORY:   64 y.o. male with preop diagnosis of * No pre-op diagnosis entered * who presents for Procedure(s) (LRB):  IRRIGATION AND DEBRIDEMENT, WOUND - LEG (Left).  He has current and past medical problems significant for:    Past Medical History:   Diagnosis Date   • Anxiety    • Charcot's joint of left foot, non-diabetic 3/21/2016   • Hepatitis C, chronic (HCC)    • Hypertension    • Kidney disease    • Migraine    • Polysubstance abuse (HCC) 3/8/2018   • Tobacco use 2016   • Ulcer of left lower extremity with necrosis of muscle (HCC) 3/21/2016   • Venous stasis ulcer (HCC) 2017    bilateral lower extremity       SMOKING/ALCOHOL/RECREATIONAL DRUG USE:  Social History     Tobacco Use   • Smoking status: Former Smoker     Packs/day: 0.00     Years: 48.00     Pack years: 0.00     Types: Cigarettes     Last attempt to quit: 2018     Years since quittin.3   • Smokeless tobacco: Never Used   • Tobacco comment: states he is using 7mg nicotine patch   Substance Use Topics   • Alcohol use: No     Alcohol/week: 7.2 oz     Types: 12 Cans of beer per week     Comment: history of alcoholism    • Drug use: Yes     Types: Marijuana, Inhaled, Methamphetamines     Comment: MJ every day, intermittent meth use     Social History     Substance and Sexual Activity   Drug Use Yes   • Types: Marijuana, Inhaled, Methamphetamines    Comment: MJ every day, intermittent meth use       PAST SURGICAL HISTORY:  Past  Surgical History:   Procedure Laterality Date   • TIBIA ORIF Right 1997   • SHOULDER ORIF Left 1997   • HAND SURGERY Left     due to infection   • PB DRAIN SKIN ABSCESS SIMPLE Left     leg, near the knee, remote       ALLERGIES:   Allergies   Allergen Reactions   • Norco [Hydrocodone-Acetaminophen] Itching       VITALS:  Patient Vitals for the past 24 hrs:   BP Temp Temp src Pulse Resp SpO2   04/22/20 0840 132/80 36.9 °C (98.5 °F) Temporal 76 18 100 %   04/22/20 0500 136/71 36.8 °C (98.2 °F) Temporal 74 18 96 %   04/21/20 2000 143/99 37.2 °C (98.9 °F) Temporal 78 18 94 %   04/21/20 1600 130/77 37.6 °C (99.7 °F) Temporal 75 19 95 %       MEDICATIONS:  No current facility-administered medications on file prior to encounter.      Current Outpatient Medications on File Prior to Encounter   Medication Sig Dispense Refill   • acetaminophen (TYLENOL) 500 MG Tab Take 1,000 mg by mouth 1 time daily as needed for Moderate Pain.     • amitriptyline (ELAVIL) 25 MG Tab Take 1 Tab by mouth at bedtime as needed for Sleep. 30 Tab 3   • sertraline (ZOLOFT) 100 MG Tab Take 1 Tab by mouth every day. 30 Tab 3   • ferrous gluconate (FERGON) 324 (38 Fe) MG Tab Take 1 Tab by mouth every morning with breakfast. 30 Tab 5   • ascorbic acid (VITAMIN C) 500 MG tablet Take 1 Tab by mouth every day. 30 Tab 5   • amLODIPine (NORVASC) 10 MG Tab Take 1 Tab by mouth every day. 30 Tab 5   • atorvastatin (LIPITOR) 40 MG Tab Take 1 Tab by mouth every day. 30 Tab 11       LABS:  Lab Results   Component Value Date/Time    HEMOGLOBIN 10.0 (L) 04/22/2020 0049    HEMATOCRIT 30.7 (L) 04/22/2020 0049    WBC 7.2 04/22/2020 0049     Lab Results   Component Value Date/Time    SODIUM 135 04/22/2020 0049    POTASSIUM 5.0 04/22/2020 0049    CHLORIDE 103 04/22/2020 0049    CO2 22 04/22/2020 0049    GLUCOSE 103 (H) 04/22/2020 0049    BUN 20 04/22/2020 0049    CALCIUM 7.6 (L) 04/22/2020 0049     Lab Results   Component Value Date/Time    INR 0.99 12/19/2018 0006     ALBUMIN 2.4 (L) 04/22/2020 0049    PREALBUMIN 18.0 12/19/2018 0028     No components found for: HGBA1C    BLOOD PRODUCTS:  No results found for: ABORH      PREVIOUS ANESTHETICS: See EMR  __________________________________________      Physical Exam    Airway   Mallampati: II  TM distance: >3 FB  Neck ROM: full       Cardiovascular - normal exam  Rhythm: regular  Rate: normal  (-) murmur     Dental - normal exam  Comments: No uppers, scant lowers, rotting    Very poor dentition   Pulmonary - normal exam  Breath sounds clear to auscultation     Abdominal   (-) obese     Neurological - normal exam                 Anesthesia Plan    ASA 3- EMERGENT   ASA physical status 3 criteria: hypertension - poorly controlled and respiratory insufficiency or compromiseASA physical status emergent criteria: acutely contaminated wound or identified infection source    Plan - general       Airway plan will be ETT        Induction: intravenous    Postoperative Plan: Postoperative administration of opioids is intended.    Pertinent diagnostic labs and testing reviewed    Informed Consent:    Anesthetic plan and risks discussed with patient.    Use of blood products discussed with: patient whom consented to blood products.

## 2020-04-22 NOTE — PROGRESS NOTES
Assumed care at 1900, report received from Day RN.  Pt A&O x 4, states pain is 5/10 on LLE. Pt previously medicated with pain meds. Bed alarm on. Bed locked, 2 rails up, bed in lowest position. Call light in place, belongings at bedside, no needs at this time and hourly rounding in place.

## 2020-04-22 NOTE — ANESTHESIA TIME REPORT
Anesthesia Start and Stop Event Times     Date Time Event    4/22/2020 1116 Ready for Procedure     1137 Anesthesia Start     1227 Anesthesia Stop        Responsible Staff  04/22/20    Name Role Begin End    Scotty Horvath M.D. Anesth 1137 1227        Preop Diagnosis (Free Text):  Pre-op Diagnosis     Left leg worsening infection        Preop Diagnosis (Codes):    Post op Diagnosis  Cellulitis of left leg      Premium Reason  Non-Premium    Comments:

## 2020-04-22 NOTE — PROGRESS NOTES
[facility] Medicine Daily Progress Note    Date of Service  4/22/2020    Chief Complaint  64 y.o. male admitted 4/16/2020 with Left leg redness and swelling.    Hospital Course    64 y.o. male who presented 4/16/2020 with left leg redness and swelling.  Patient has history of venous stasis dermatitis of both legs, and venous stasis ulcers of the left leg.  Patient states that he was discharged from wound care a month ago and said the ulcers had resolved.  But then a week ago he noted that ulcers came back he also noticed increasing redness and swelling and pain.  He denied having any fever or chills, cough or congestion, shortness of breath, abdominal pain, nausea or vomiting, or diarrhea.  On review of his previous wound cultures, he has history of MRSA, Pseudomonas, Serratia, and group B streptococcus.    Cultures on this admissioon grew MRSA and GABHS. ID has been following his care. He was eventually transitioned to oral Zyvox. He was improving. His WBCs normalized, he remained afebrile, and he was looking stable for discharge home with antibiotics and wound care. However, after wound care changed the dressing 4/21, mian pus was noted from a tunneling lesion, suggesting need for further I&D.    Interval Problem Update  VSS and WNL overnight. No acute events  CT LLE neg for abscess of obv bony destruction or gas.  WBCs remain normal. Inflammatory markers elevated.  Normocytic anemia stable - added iron, vitamin studies  LPS contacted re: CT, wound care findings. Anticipate orthopedic evaluation for I/D  ID updated on wounds/treatment plan; anticipate deeper cultures in the OR    Consultants/Specialty  I.D  LPS  ortho    Code Status  full    Disposition  pending    Review of Systems  Review of Systems   Constitutional: Negative for chills, malaise/fatigue and weight loss.   HENT: Negative for ear discharge, ear pain and nosebleeds.    Eyes: Negative for double vision, photophobia and pain.   Respiratory: Negative  for hemoptysis, sputum production and shortness of breath.    Cardiovascular: Negative for chest pain, palpitations and orthopnea.   Gastrointestinal: Negative for abdominal pain, nausea and vomiting.   Genitourinary: Negative for frequency, hematuria and urgency.   Musculoskeletal: Negative for back pain, myalgias and neck pain.        Left leg pain   Skin: Negative for itching.   Neurological: Negative for dizziness, tingling and headaches.        Physical Exam  Temp:  [36.8 °C (98.2 °F)-37.6 °C (99.7 °F)] 36.8 °C (98.2 °F)  Pulse:  [74-78] 74  Resp:  [16-19] 18  BP: (112-143)/(71-99) 136/71  SpO2:  [92 %-96 %] 96 %    Physical Exam  Vitals signs and nursing note reviewed.   HENT:      Head: Normocephalic and atraumatic.      Nose: Nose normal.   Eyes:      Conjunctiva/sclera: Conjunctivae normal.      Pupils: Pupils are equal, round, and reactive to light.   Neck:      Musculoskeletal: Neck supple.   Cardiovascular:      Rate and Rhythm: Normal rate and regular rhythm.      Heart sounds: Murmur present. Systolic murmur present with a grade of 1/6. No friction rub. No S3 or S4 sounds.    Pulmonary:      Effort: No respiratory distress.   Abdominal:      General: Bowel sounds are normal. There is no distension.      Palpations: Abdomen is soft.   Musculoskeletal:      Right lower leg: No edema.      Left lower leg: No edema.      Comments: Left lower extremity with tunneled ulceration and multiple wounds with mian pus and bloody discharge noted during dressing change; markedly atrophic calf and Achilles   Skin:     General: Skin is warm and dry.   Neurological:      Mental Status: He is alert.         Fluids    Intake/Output Summary (Last 24 hours) at 4/22/2020 0654  Last data filed at 4/22/2020 0500  Gross per 24 hour   Intake 540 ml   Output 1050 ml   Net -510 ml       Laboratory  Recent Labs     04/20/20  0245 04/21/20  0358 04/22/20  0049   WBC 9.0 7.3 7.2   RBC 3.55* 3.24* 3.26*   HEMOGLOBIN 10.8* 9.9* 10.0*    HEMATOCRIT 33.8* 30.6* 30.7*   MCV 95.2 94.4 94.2   MCH 30.4 30.6 30.7   MCHC 32.0* 32.4* 32.6*   RDW 53.7* 53.6* 53.8*   PLATELETCT 332 311 312   MPV 9.2 9.0 8.8*     Recent Labs     04/20/20  0245 04/21/20  0358 04/22/20  0049   SODIUM 138 136 135   POTASSIUM 4.4 5.0 5.0   CHLORIDE 104 106 103   CO2 20 21 22   GLUCOSE 110* 106* 103*   BUN 35* 24* 20   CREATININE 1.55* 1.12 1.12   CALCIUM 8.2* 7.8* 7.6*                   Imaging   4/21/2020 5:16 PM     HISTORY/REASON FOR EXAM:  Lower leg pain, suspected osteomyelitis.     TECHNIQUE/EXAM DESCRIPTION AND NUMBER OF VIEWS:  CT scan of the LEFT lower extremity with contrast, with reconstructions.     Thin helical 3 mm sections were obtained from the distal femur through the proximal tibia/fibula. Sagittal and coronal multiplanar reconstructions were generated from the axial images. A total of 100 mL of Omnipaque 350 nonionic contrast was administered   IV without complication.     Up to date radiation dose reduction adjustments have been utilized to meet ALARA standards for radiation dose reduction.     COMPARISON: 4/16/2020.     FINDINGS:  No acute fracture or dislocation is identified.     Cellulitis of the lower leg, with soft tissue prominence and subcutaneous air. No subjacent cortical bone destruction to suggest osteomyelitis. No drainable abscess.     There is chronic appearing valgus deviation of the foot at the subtalar joint, and there is suspected pes planovalgus. Moderate subtalar osteoarthrosis. Multifocal mild to moderate osteoarthrosis in the midfoot. Mild multifocal interphalangeal   osteoarthrosis.     Reactive popliteal lymphadenopathy.     IMPRESSION:        1. No evidence of osteomyelitis.  2. Cellulitis and soft tissue organs of the left lower leg. Subcutaneous air without drainable abscess.  3. Suspected pes planovalgus deformity. Multifocal osteoarthrosis of the midfoot.    Assessment/Plan  * Cellulitis of left lower extremity- (present on  admission)  Assessment & Plan  Repeat CT LLE w (-) for OM/abscess  Continue Zyvox for now  Discussed w LPS - ortho plans I/D, NPWT, deep cultures  ID updated  -Arterial ultrasound on 12/27/2019 showed:No evidence of arterial insufficiency in either lower extremity       Venous stasis ulcer of left lower extremity (HCC)- (present on admission)  Assessment & Plan  Likely need for NPWT  Further direction from Ortho/LPS/Wound Team    Venous stasis dermatitis of both lower extremities- (present on admission)  Assessment & Plan  Wound care ongoing    CKD (chronic kidney disease) stage 3, GFR 30-59 ml/min (HCC)- (present on admission)  Assessment & Plan  Acute on chronic  At baseline now      Essential hypertension- (present on admission)  Assessment & Plan  Controlled  IV Hydralazine as needed with parameters    Normocytic anemia  Assessment & Plan  TIBC    Methamphetamine abuse (HCC)- (present on admission)  Assessment & Plan  Positive  UDS    Alcoholism (HCC)- (present on admission)  Assessment & Plan  Check B12, B1  Daily Mag, Phos, BMP         VTE prophylaxis: heparin

## 2020-04-22 NOTE — OR NURSING
Patient A+Ox4.  Dr Horvath orders are not in for post-op.  Called to update him.  Awaiting meds.  Patient 6/10 pain

## 2020-04-22 NOTE — ANESTHESIA QCDR
2019 [facility] Clinical Data Registry (for Quality Improvement)     Postoperative nausea/vomiting risk protocol (Adult = 18 yrs and Pediatric 3-17 yrs)- (430 and 463)  General inhalation anesthetic (NOT TIVA) with PONV risk factors: Yes  Provision of anti-emetic therapy with at least 2 different classes of agents: Yes   Patient DID NOT receive anti-emetic therapy and reason is documented in Medical Record:  N/A    Multimodal Pain Management- (477)  Non-emergent surgery AND patient age >= 18: No  Use of Multimodal Pain Management, two or more drugs and/or interventions, NOT including systemic opioids:   Exception: Documented allergy to multiple classes of analgesics:     Smoking Abstinence (404)  Patient is current smoker (cigarette, pipe, e-cig, marijuanna): No  Elective Surgery:   Abstinence instructions provided prior to day of surgery:   Patient abstained from smoking on day of surgery:     Pre-Op Beta-Blocker in Isolated CABG (44)  Isolated CABG AND patient age >= 18: No  Beta-blocker admin within 24 hours of surgical incision:   Exception:of medical reason(s) for not administering beta blocker within 24 hours prior to surgical incision (e.g., not  indicated,other medical reason):     PACU assessment of acute postoperative pain prior to Anesthesia Care End- Applies to Patients Age = 18- (ABG7)  Initial PACU pain score is which of the following: < 7/10  Patient unable to report pain score: N/A    Post-anesthetic transfer of care checklist/protocol to PACU/ICU- (426 and 427)  Upon conclusion of case, patient transferred to which of the following locations: PACU/Non-ICU  Use of transfer checklist/protocol: Yes  Exclusion: Service Performed in Patient Hospital Room (and thus did not require transfer): N/A  Unplanned admission to ICU related to anesthesia service up through end of PACU care- (MD51)  Unplanned admission to ICU (not initially anticipated at anesthesia start time): No

## 2020-04-22 NOTE — ANESTHESIA PROCEDURE NOTES
Airway  Date/Time: 4/22/2020 11:43 AM  Performed by: Scotty Horvath M.D.  Authorized by: Scotty Horvath M.D.     Location:  OR  Urgency:  Elective  Difficult Airway: No    Indications for Airway Management:  Anesthesia      Spontaneous Ventilation: absent    Sedation Level:  Deep  Preoxygenated: Yes    Patient Position:  Sniffing  Mask Difficulty Assessment:  1 - vent by mask  Final Airway Type:  Endotracheal airway  Final Endotracheal Airway:  ETT  Cuffed: Yes    Technique Used for Successful ETT Placement:  Direct laryngoscopy  Insertion Site:  Oral  Blade Type:  Almanzar  Laryngoscope Blade/Videolaryngoscope Blade Size:  2  ETT Size (mm):  7.5  Measured from:  Teeth  ETT to Teeth (cm):  23  Placement Verified by: auscultation and capnometry    Cormack-Lehane Classification:  Grade I - full view of glottis  Number of Attempts at Approach:  1

## 2020-04-22 NOTE — PROGRESS NOTES
4/22/2020    Goran Chavez    .S: Left leg with worsening infection    O:  /80   Pulse 76   Temp 36.9 °C (98.5 °F) (Temporal)   Resp 18   Ht 1.829 m (6')   Wt 83.6 kg (184 lb 4.9 oz)   SpO2 100%   Recent Labs     04/20/20  0245 04/21/20  0358 04/22/20  0049   WBC 9.0 7.3 7.2   RBC 3.55* 3.24* 3.26*   HEMOGLOBIN 10.8* 9.9* 10.0*   HEMATOCRIT 33.8* 30.6* 30.7*   MCV 95.2 94.4 94.2   MCH 30.4 30.6 30.7   MCHC 32.0* 32.4* 32.6*   RDW 53.7* 53.6* 53.8*   PLATELETCT 332 311 312   MPV 9.2 9.0 8.8*         Recent Labs     04/20/20  0245 04/21/20 0358 04/22/20  0049   SODIUM 138 136 135   POTASSIUM 4.4 5.0 5.0   CHLORIDE 104 106 103   CO2 20 21 22   GLUCOSE 110* 106* 103*   BUN 35* 24* 20       Intake/Output Summary (Last 24 hours) at 4/22/2020 1123  Last data filed at 4/22/2020 0840  Gross per 24 hour   Intake 300 ml   Output 1375 ml   Net -1075 ml     I have reviewed the relevant vascular and radiology studies.  Left leg with purulant drainage.    A:   Active Hospital Problems    Diagnosis   • Venous stasis ulcer of left lower extremity ([facility]) [I83.029, L97.929]     Priority: High     S/p venous ablation through vascular with improvement in wound healing, using lymphedema pump and compression     • Cellulitis of left lower extremity [L03.116]     Priority: High   • Venous stasis dermatitis of both lower extremities [I87.2]     Priority: Medium   • CKD (chronic kidney disease) stage 3, GFR 30-59 ml/min ([facility]) [N18.3]     Priority: Medium   • Essential hypertension [I10]     Priority: Medium   • Normocytic anemia [D64.9]   • Methamphetamine abuse ([facility]) [F15.10]   • Alcoholism ([facility]) [F10.20]         P: I+D L leg.  Possible vac

## 2020-04-22 NOTE — OR NURSING
Patient now states pain tolerable 4/10.  Given iv meds as ordered.  Patient denies nausea now.  bilat feel elevated on pillows,  Wound vac on and functioning.  Patient to transfer back to [facility].  Report called to Caitlyn ROGERS

## 2020-04-22 NOTE — PROGRESS NOTES
Infectious diseases Follow-up Consult Note    Date of note:    4/20/2020      Consulting Physician: ALEJANDRO Krishnamurthy M.D.      Chief Complaint   Patient presents with   • Wound Check     Pt reports to have left leg wound, being seen at wound care, sent by MD for further care. pt denies fever, but states wounds on leg are draining.        Interim Events:  ID was reconsulted today given worsening drainage from LLE  Afebrile, VSS, no leukocytosis  ESR and CRP elevated  CT LLE 4/21 showed no OM and no drainable abscess    C/o worsening pain in LLE  On examination- purulent and serosanguinous drainage from LLE soaking the dressing. Also seen are a couple of tunelling lesions.  Plan for OR today  Continue linezolid, to start unasyn after coming back from OR- orders placed    ROS  Constitutional: Negative for fevers or chills.   Respiratory: Negative for cough and shortness of breath.    Cardiovascular: Negative for chest pain or palpitations.  Gastrointestinal: Negative for abdominal pain, diarrhea, nausea and vomiting.   Genitourinary: Negative for dysuria.   Musculoskeletal: Positive for LLE pain , drainage and swelling   Neurological: Negative for dizziness and headaches.   All other systems reviewed and are negative.    Past medical, surgical, social, and family history reviewed and unchanged from admission except as noted in assessment and plan.    Medications: Reviewed in MAR  Current Facility-Administered Medications   Medication Dose Frequency Provider Last Rate Last Dose   • ampicillin/sulbactam (UNASYN) 3 g in  mL IVPB  3 g Q6HRS Krista Trejo M.D.       • linezolid (ZYVOX) tablet 600 mg  600 mg Q12HRS ALEJANDRO Krishnamurthy M.D.   600 mg at 04/22/20 0526   • amitriptyline (ELAVIL) tablet 25 mg  25 mg HS PRN Silverio Lopez M.D.       • atorvastatin (LIPITOR) tablet 40 mg  40 mg DAILY Silverio Lopez M.D.   40 mg at 04/22/20 0526   • ferrous gluconate (FERGON) tablet 324 mg  324 mg QDAY with Breakfast  Silverio Lopez M.D.   Stopped at 04/22/20 0730   • sertraline (ZOLOFT) tablet 100 mg  100 mg DAILY Silverio Lopez M.D.   100 mg at 04/22/20 0525   • acetaminophen (TYLENOL) tablet 650 mg  650 mg Q6HRS PRLEON Lopez M.D.   650 mg at 04/17/20 1736   • ondansetron (ZOFRAN) syringe/vial injection 4 mg  4 mg Q4HRS PRLEON Lopez M.D.       • ondansetron (ZOFRAN ODT) dispertab 4 mg  4 mg Q4HRS PRLEON Lopez M.D.       • promethazine (PHENERGAN) tablet 12.5-25 mg  12.5-25 mg Q4HRS MICHELLE Lopez M.D.       • promethazine (PHENERGAN) suppository 12.5-25 mg  12.5-25 mg Q4HRS PRLEON Lopez M.D.       • prochlorperazine (COMPAZINE) injection 5-10 mg  5-10 mg Q4HRS PRLEON Lopez M.D.       • [Held by provider] heparin injection 5,000 Units  5,000 Units Q8HRS Silverio Lopez M.D.   Stopped at 04/22/20 0800   • hydrALAZINE (APRESOLINE) injection 10 mg  10 mg Q6HRS PRLEON Lopez M.D.       • oxyCODONE immediate-release (ROXICODONE) tablet 5-10 mg  5-10 mg Q4HRS PRLEON Lopez M.D.   5 mg at 04/22/20 0148   • morphine (pf) 4 MG/ML injection 2-4 mg  2-4 mg Q4HRS PRLEON Lopez M.D.   2 mg at 04/16/20 2004   Last reviewed on 4/16/2020  2:36 PM by Lisa Roque PhT    Allergies   Allergen Reactions   • Norco [Hydrocodone-Acetaminophen] Itching       Physical Exam  Body mass index is 25 kg/m². /80   Pulse 76   Temp 36.9 °C (98.5 °F) (Temporal)   Resp 18   Ht 1.829 m (6')   Wt 83.6 kg (184 lb 4.9 oz)   SpO2 100%    Vitals:    04/21/20 1600 04/21/20 2000 04/22/20 0500 04/22/20 0840   BP: 130/77 143/99 136/71 132/80   Pulse: 75 78 74 76   Resp: 19 18 18 18   Temp: 37.6 °C (99.7 °F) 37.2 °C (98.9 °F) 36.8 °C (98.2 °F) 36.9 °C (98.5 °F)   TempSrc: Temporal Temporal Temporal Temporal   SpO2: 95% 94% 96% 100%   Weight:       Height:        Oxygen Therapy:  Pulse Oximetry: 100 %, O2 (LPM): 0, O2 Delivery Device: None - Room Air    General: no apparent  distress  HEENT: NA, AT, conjunctiva normal   Lungs: normal respiratory effort, CTAB  Heart: RRR, no murmurs  EXT:Chronic venous stasis changes noted bilaterally. LLE- purulent and serosanguinous drainage soaking the dressing. Also seen are a couple of tunelling lesions.  Abdomen: soft, non tender, non distended  Neurological: A/O x 3. No focal sensory deficits      Labs (personally reviewed and notable for):   Recent Results (from the past 24 hour(s))   PROCALCITONIN    Collection Time: 04/22/20 12:49 AM   Result Value Ref Range    Procalcitonin 0.21 <0.25 ng/mL   Sed Rate    Collection Time: 04/22/20 12:49 AM   Result Value Ref Range    Sed Rate Westergren >120 (H) 0 - 20 mm/hour   CRP QUANTITIVE (NON-CARDIAC)    Collection Time: 04/22/20 12:49 AM   Result Value Ref Range    Stat C-Reactive Protein 14.44 (H) 0.00 - 0.75 mg/dL   Comp Metabolic Panel    Collection Time: 04/22/20 12:49 AM   Result Value Ref Range    Sodium 135 135 - 145 mmol/L    Potassium 5.0 3.6 - 5.5 mmol/L    Chloride 103 96 - 112 mmol/L    Co2 22 20 - 33 mmol/L    Anion Gap 10.0 7.0 - 16.0    Glucose 103 (H) 65 - 99 mg/dL    Bun 20 8 - 22 mg/dL    Creatinine 1.12 0.50 - 1.40 mg/dL    Calcium 7.6 (L) 8.5 - 10.5 mg/dL    AST(SGOT) 23 12 - 45 U/L    ALT(SGPT) 20 2 - 50 U/L    Alkaline Phosphatase 114 (H) 30 - 99 U/L    Total Bilirubin 0.5 0.1 - 1.5 mg/dL    Albumin 2.4 (L) 3.2 - 4.9 g/dL    Total Protein 6.7 6.0 - 8.2 g/dL    Globulin 4.3 (H) 1.9 - 3.5 g/dL    A-G Ratio 0.6 g/dL   CBC WITH DIFFERENTIAL    Collection Time: 04/22/20 12:49 AM   Result Value Ref Range    WBC 7.2 4.8 - 10.8 K/uL    RBC 3.26 (L) 4.70 - 6.10 M/uL    Hemoglobin 10.0 (L) 14.0 - 18.0 g/dL    Hematocrit 30.7 (L) 42.0 - 52.0 %    MCV 94.2 81.4 - 97.8 fL    MCH 30.7 27.0 - 33.0 pg    MCHC 32.6 (L) 33.7 - 35.3 g/dL    RDW 53.8 (H) 35.9 - 50.0 fL    Platelet Count 312 164 - 446 K/uL    MPV 8.8 (L) 9.0 - 12.9 fL    Neutrophils-Polys 73.80 (H) 44.00 - 72.00 %    Lymphocytes 12.00  (L) 22.00 - 41.00 %    Monocytes 11.20 0.00 - 13.40 %    Eosinophils 1.50 0.00 - 6.90 %    Basophils 0.40 0.00 - 1.80 %    Immature Granulocytes 1.10 (H) 0.00 - 0.90 %    Nucleated RBC 0.00 /100 WBC    Neutrophils (Absolute) 5.34 1.82 - 7.42 K/uL    Lymphs (Absolute) 0.87 (L) 1.00 - 4.80 K/uL    Monos (Absolute) 0.81 0.00 - 0.85 K/uL    Eos (Absolute) 0.11 0.00 - 0.51 K/uL    Baso (Absolute) 0.03 0.00 - 0.12 K/uL    Immature Granulocytes (abs) 0.08 0.00 - 0.11 K/uL    NRBC (Absolute) 0.00 K/uL   MAGNESIUM    Collection Time: 04/22/20 12:49 AM   Result Value Ref Range    Magnesium 2.0 1.5 - 2.5 mg/dL   PHOSPHORUS    Collection Time: 04/22/20 12:49 AM   Result Value Ref Range    Phosphorus 2.9 2.5 - 4.5 mg/dL   ESTIMATED GFR    Collection Time: 04/22/20 12:49 AM   Result Value Ref Range    GFR If African American >60 >60 mL/min/1.73 m 2    GFR If Non African American >60 >60 mL/min/1.73 m 2           ASSESSMENT & PLAN    #LLE cellulitis  #Chronic venous stasis changes in bilateral lower extremities  #Polysubstance abuse with methamphetamines and cannabinoids  #Hepatitis C status post treatment    -Afebrile, leukocytosis resolved  -Blood culture showed no growth to date  -Wound cultures positive for group A strep and MRSA  -ESR, CRP elevated  -CT left lower extremity 4/21 showed no OM or drainable abscess   -Plan for OR today  -Continue linezolid, to start unasyn after coming back from OR- orders placed  -will f/u intraoperative cultures  -Monitor CBC while on linezolid  -Continue wound care and recommend follow-up with outpatient wound clinic    Case discussed with Dr. Florez, ID to sign off. Please call with any questions.

## 2020-04-23 LAB
ALBUMIN SERPL BCP-MCNC: 2.2 G/DL (ref 3.2–4.9)
ALBUMIN/GLOB SERPL: 0.5 G/DL
ALP SERPL-CCNC: 105 U/L (ref 30–99)
ALT SERPL-CCNC: 15 U/L (ref 2–50)
ANION GAP SERPL CALC-SCNC: 12 MMOL/L (ref 7–16)
AST SERPL-CCNC: 19 U/L (ref 12–45)
BASOPHILS # BLD AUTO: 0.3 % (ref 0–1.8)
BASOPHILS # BLD: 0.03 K/UL (ref 0–0.12)
BILIRUB SERPL-MCNC: 0.4 MG/DL (ref 0.1–1.5)
BUN SERPL-MCNC: 22 MG/DL (ref 8–22)
CALCIUM SERPL-MCNC: 7.7 MG/DL (ref 8.5–10.5)
CHLORIDE SERPL-SCNC: 104 MMOL/L (ref 96–112)
CO2 SERPL-SCNC: 21 MMOL/L (ref 20–33)
CREAT SERPL-MCNC: 1.37 MG/DL (ref 0.5–1.4)
EOSINOPHIL # BLD AUTO: 0 K/UL (ref 0–0.51)
EOSINOPHIL NFR BLD: 0 % (ref 0–6.9)
ERYTHROCYTE [DISTWIDTH] IN BLOOD BY AUTOMATED COUNT: 53.4 FL (ref 35.9–50)
ERYTHROCYTE [SEDIMENTATION RATE] IN BLOOD BY WESTERGREN METHOD: 83 MM/HOUR (ref 0–20)
GLOBULIN SER CALC-MCNC: 4.6 G/DL (ref 1.9–3.5)
GLUCOSE SERPL-MCNC: 117 MG/DL (ref 65–99)
HCT VFR BLD AUTO: 32.6 % (ref 42–52)
HGB BLD-MCNC: 10.3 G/DL (ref 14–18)
IMM GRANULOCYTES # BLD AUTO: 0.09 K/UL (ref 0–0.11)
IMM GRANULOCYTES NFR BLD AUTO: 0.8 % (ref 0–0.9)
LYMPHOCYTES # BLD AUTO: 0.42 K/UL (ref 1–4.8)
LYMPHOCYTES NFR BLD: 3.7 % (ref 22–41)
MAGNESIUM SERPL-MCNC: 2 MG/DL (ref 1.5–2.5)
MCH RBC QN AUTO: 30.3 PG (ref 27–33)
MCHC RBC AUTO-ENTMCNC: 31.6 G/DL (ref 33.7–35.3)
MCV RBC AUTO: 95.9 FL (ref 81.4–97.8)
MONOCYTES # BLD AUTO: 0.76 K/UL (ref 0–0.85)
MONOCYTES NFR BLD AUTO: 6.7 % (ref 0–13.4)
NEUTROPHILS # BLD AUTO: 10.01 K/UL (ref 1.82–7.42)
NEUTROPHILS NFR BLD: 88.5 % (ref 44–72)
NRBC # BLD AUTO: 0 K/UL
NRBC BLD-RTO: 0 /100 WBC
PHOSPHATE SERPL-MCNC: 3.7 MG/DL (ref 2.5–4.5)
PLATELET # BLD AUTO: 343 K/UL (ref 164–446)
PMV BLD AUTO: 8.7 FL (ref 9–12.9)
POTASSIUM SERPL-SCNC: 4.8 MMOL/L (ref 3.6–5.5)
PROT SERPL-MCNC: 6.8 G/DL (ref 6–8.2)
RBC # BLD AUTO: 3.4 M/UL (ref 4.7–6.1)
SODIUM SERPL-SCNC: 137 MMOL/L (ref 135–145)
WBC # BLD AUTO: 11.3 K/UL (ref 4.8–10.8)

## 2020-04-23 PROCEDURE — 85652 RBC SED RATE AUTOMATED: CPT

## 2020-04-23 PROCEDURE — 36415 COLL VENOUS BLD VENIPUNCTURE: CPT

## 2020-04-23 PROCEDURE — 85025 COMPLETE CBC W/AUTO DIFF WBC: CPT

## 2020-04-23 PROCEDURE — 700105 HCHG RX REV CODE 258: Performed by: STUDENT IN AN ORGANIZED HEALTH CARE EDUCATION/TRAINING PROGRAM

## 2020-04-23 PROCEDURE — 700105 HCHG RX REV CODE 258: Performed by: INTERNAL MEDICINE

## 2020-04-23 PROCEDURE — 84100 ASSAY OF PHOSPHORUS: CPT

## 2020-04-23 PROCEDURE — A9270 NON-COVERED ITEM OR SERVICE: HCPCS | Performed by: FAMILY MEDICINE

## 2020-04-23 PROCEDURE — 83735 ASSAY OF MAGNESIUM: CPT

## 2020-04-23 PROCEDURE — 80053 COMPREHEN METABOLIC PANEL: CPT

## 2020-04-23 PROCEDURE — 700102 HCHG RX REV CODE 250 W/ 637 OVERRIDE(OP): Performed by: FAMILY MEDICINE

## 2020-04-23 PROCEDURE — 97165 OT EVAL LOW COMPLEX 30 MIN: CPT

## 2020-04-23 PROCEDURE — 700111 HCHG RX REV CODE 636 W/ 250 OVERRIDE (IP): Performed by: STUDENT IN AN ORGANIZED HEALTH CARE EDUCATION/TRAINING PROGRAM

## 2020-04-23 PROCEDURE — 700105 HCHG RX REV CODE 258: Performed by: NURSE PRACTITIONER

## 2020-04-23 PROCEDURE — 770021 HCHG ROOM/CARE - ISO PRIVATE

## 2020-04-23 PROCEDURE — 700111 HCHG RX REV CODE 636 W/ 250 OVERRIDE (IP): Performed by: INTERNAL MEDICINE

## 2020-04-23 PROCEDURE — 700102 HCHG RX REV CODE 250 W/ 637 OVERRIDE(OP): Performed by: NURSE PRACTITIONER

## 2020-04-23 PROCEDURE — 700111 HCHG RX REV CODE 636 W/ 250 OVERRIDE (IP): Performed by: NURSE PRACTITIONER

## 2020-04-23 PROCEDURE — 99233 SBSQ HOSP IP/OBS HIGH 50: CPT | Mod: GC | Performed by: INTERNAL MEDICINE

## 2020-04-23 PROCEDURE — A9270 NON-COVERED ITEM OR SERVICE: HCPCS | Performed by: NURSE PRACTITIONER

## 2020-04-23 PROCEDURE — 99232 SBSQ HOSP IP/OBS MODERATE 35: CPT | Performed by: INTERNAL MEDICINE

## 2020-04-23 RX ORDER — POLYETHYLENE GLYCOL 3350 17 G/17G
1 POWDER, FOR SOLUTION ORAL DAILY
Status: DISCONTINUED | OUTPATIENT
Start: 2020-04-23 | End: 2020-05-19

## 2020-04-23 RX ORDER — AMOXICILLIN 250 MG
1 CAPSULE ORAL 2 TIMES DAILY
Status: DISCONTINUED | OUTPATIENT
Start: 2020-04-23 | End: 2020-04-26

## 2020-04-23 RX ORDER — SODIUM CHLORIDE 9 MG/ML
INJECTION, SOLUTION INTRAVENOUS CONTINUOUS
Status: DISCONTINUED | OUTPATIENT
Start: 2020-04-23 | End: 2020-04-26

## 2020-04-23 RX ADMIN — SENNOSIDES AND DOCUSATE SODIUM 1 TABLET: 8.6; 5 TABLET ORAL at 10:29

## 2020-04-23 RX ADMIN — PIPERACILLIN AND TAZOBACTAM 4.5 G: 4; .5 INJECTION, POWDER, LYOPHILIZED, FOR SOLUTION INTRAVENOUS; PARENTERAL at 19:59

## 2020-04-23 RX ADMIN — OXYCODONE 10 MG: 5 TABLET ORAL at 15:03

## 2020-04-23 RX ADMIN — OXYCODONE 5 MG: 5 TABLET ORAL at 05:46

## 2020-04-23 RX ADMIN — OXYCODONE 10 MG: 5 TABLET ORAL at 10:29

## 2020-04-23 RX ADMIN — SODIUM CHLORIDE: 9 INJECTION, SOLUTION INTRAVENOUS at 08:38

## 2020-04-23 RX ADMIN — OXYCODONE 5 MG: 5 TABLET ORAL at 19:58

## 2020-04-23 RX ADMIN — HEPARIN SODIUM 5000 UNITS: 5000 INJECTION, SOLUTION INTRAVENOUS; SUBCUTANEOUS at 15:04

## 2020-04-23 RX ADMIN — LINEZOLID 600 MG: 600 TABLET, FILM COATED ORAL at 17:00

## 2020-04-23 RX ADMIN — LINEZOLID 600 MG: 600 TABLET, FILM COATED ORAL at 05:40

## 2020-04-23 RX ADMIN — AMPICILLIN SODIUM AND SULBACTAM SODIUM 3 G: 2; 1 INJECTION, POWDER, FOR SOLUTION INTRAMUSCULAR; INTRAVENOUS at 11:40

## 2020-04-23 RX ADMIN — SODIUM CHLORIDE: 9 INJECTION, SOLUTION INTRAVENOUS at 11:42

## 2020-04-23 RX ADMIN — POLYETHYLENE GLYCOL 3350 1 PACKET: 17 POWDER, FOR SOLUTION ORAL at 10:29

## 2020-04-23 RX ADMIN — FERROUS GLUCONATE 324 MG: 324 TABLET ORAL at 08:34

## 2020-04-23 RX ADMIN — ATORVASTATIN CALCIUM 40 MG: 40 TABLET, FILM COATED ORAL at 05:40

## 2020-04-23 RX ADMIN — HEPARIN SODIUM 5000 UNITS: 5000 INJECTION, SOLUTION INTRAVENOUS; SUBCUTANEOUS at 08:34

## 2020-04-23 RX ADMIN — PIPERACILLIN AND TAZOBACTAM 4.5 G: 4; .5 INJECTION, POWDER, LYOPHILIZED, FOR SOLUTION INTRAVENOUS; PARENTERAL at 16:50

## 2020-04-23 RX ADMIN — SERTRALINE HYDROCHLORIDE 100 MG: 100 TABLET ORAL at 05:40

## 2020-04-23 RX ADMIN — SENNOSIDES AND DOCUSATE SODIUM 1 TABLET: 8.6; 5 TABLET ORAL at 17:00

## 2020-04-23 RX ADMIN — AMPICILLIN SODIUM AND SULBACTAM SODIUM 3 G: 2; 1 INJECTION, POWDER, FOR SOLUTION INTRAMUSCULAR; INTRAVENOUS at 05:41

## 2020-04-23 ASSESSMENT — ENCOUNTER SYMPTOMS
BACK PAIN: 0
MYALGIAS: 0
DOUBLE VISION: 0
PHOTOPHOBIA: 0
WEIGHT LOSS: 0
VOMITING: 0
ORTHOPNEA: 0
NAUSEA: 0
CHILLS: 0
ABDOMINAL PAIN: 0
HEADACHES: 0
DIZZINESS: 0
NECK PAIN: 0
EYE PAIN: 0
SPUTUM PRODUCTION: 0
SHORTNESS OF BREATH: 0
TINGLING: 0
HEMOPTYSIS: 0
PALPITATIONS: 0

## 2020-04-23 ASSESSMENT — COGNITIVE AND FUNCTIONAL STATUS - GENERAL
HELP NEEDED FOR BATHING: A LITTLE
DRESSING REGULAR LOWER BODY CLOTHING: A LOT
DAILY ACTIVITIY SCORE: 20
SUGGESTED CMS G CODE MODIFIER DAILY ACTIVITY: CJ
TOILETING: A LITTLE

## 2020-04-23 ASSESSMENT — ACTIVITIES OF DAILY LIVING (ADL): TOILETING: INDEPENDENT

## 2020-04-23 NOTE — CARE PLAN
Problem: Knowledge Deficit  Goal: Knowledge of the prescribed therapeutic regimen will improve  Outcome: PROGRESSING AS EXPECTED  Pt aware of POC. Pt able to express needs.      Problem: Pain Management  Goal: Pain level will decrease to patient's comfort goal  Outcome: PROGRESSING AS EXPECTED   PRN pain medications provided per MAR. LLE repositioned with pillows.
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
## 2020-04-23 NOTE — ANESTHESIA POSTPROCEDURE EVALUATION
Patient: Goran Chavez    Procedure Summary     Date:  04/22/20 Room / Location:  [place] [address] / SURGERY [facility]    Anesthesia Start:  1137 Anesthesia Stop:  1227    Procedure:  IRRIGATION AND DEBRIDEMENT, WOUND - LEG (Left Leg Lower) Diagnosis:  (Left leg worsening infection)    Surgeon:  Barrie Israel M.D. Responsible Provider:  Scotty Horvath M.D.    Anesthesia Type:  general ASA Status:  3 - Emergent          Final Anesthesia Type: general  Last vitals  BP   Blood Pressure: 135/89    Temp   37.1 °C (98.7 °F)    Pulse   Pulse: 69   Resp   17    SpO2   96 %      Anesthesia Post Evaluation    Patient location during evaluation: PACU  Patient participation: complete - patient participated  Level of consciousness: awake and alert    Airway patency: patent  Anesthetic complications: no  Cardiovascular status: hemodynamically stable  Respiratory status: acceptable  Hydration status: euvolemic    PONV: none           Nurse Pain Score: 0 (NPRS)

## 2020-04-23 NOTE — PROGRESS NOTES
Assumed care at 1900, report received from Day RN.  Pt A&O x 4, states pain is 5/10 on LLE. Wound vac in place. Bed alarm on. Pt on RA. Bed locked, 2 rails up, bed in lowest position. Call light in place, belongings at bedside, no needs at this time and hourly rounding in place.

## 2020-04-23 NOTE — PROGRESS NOTES
Bedside report received from Marisabel ROGERS . Assumed care of pt at 0645 . Pt is awake at this time with no signs of distress. Plan of care discussed with pt. Pt is A&O x 4. Pt is on RA, but needed 2L NC while sleep due to desaturating down to 86%. Bed alarm is on, bed in lowest position, bed rails up x 2, belongings and call light within reach. Hourly rounding in place.

## 2020-04-23 NOTE — PROGRESS NOTES
CHW spoke with pt via bedside TC to introduce CCM services. Pt accepted. Pt confirmed PCP is Marcela Ch M.D. at the [facility]. Follow up appmnt scheduled for 5/6 at 2pm. Pt reports no transportation issues getting to appmnts. Pt reports no trouble paying for resources such as care, housing, and food. Pt feels confident in managing his health after d/c. Pt interested in meds to beds.     Plan: Follow up call after d/c.

## 2020-04-23 NOTE — PROGRESS NOTES
Infectious diseases Follow-up Consult Note    Date of note:    4/20/2020      Consulting Physician: ALEJANDRO Krishnamurthy M.D.      Chief Complaint   Patient presents with   • Wound Check     Pt reports to have left leg wound, being seen at wound care, sent by MD for further care. pt denies fever, but states wounds on leg are draining.        Interim Events:  S/p I&D 4/22, POD 1  Afebrile, VSS  Mild leukocytosis  Intraoperative cx pending    C/o  pain in LLE  Wound vac in place  Plan for OR again on 4/24      ROS  Constitutional: Negative for fevers or chills.   Respiratory: Negative for cough and shortness of breath.    Cardiovascular: Negative for chest pain or palpitations.  Gastrointestinal: Negative for abdominal pain, diarrhea, nausea and vomiting.   Genitourinary: Negative for dysuria.   Musculoskeletal: Positive for LLE pain and swelling   Neurological: Negative for dizziness and headaches.   All other systems reviewed and are negative.    Past medical, surgical, social, and family history reviewed and unchanged from admission except as noted in assessment and plan.    Medications: Reviewed in MAR  Current Facility-Administered Medications   Medication Dose Frequency Provider Last Rate Last Dose   • NS infusion   Continuous Mulugeta CHARLETTE Pacheco, A.P.N. 83 mL/hr at 04/23/20 0838     • senna-docusate (PERICOLACE or SENOKOT S) 8.6-50 MG per tablet 1 Tab  1 Tab BID Mulugeta CHARLETTE Oldlicha, A.P.N.   1 Tab at 04/23/20 1029   • polyethylene glycol/lytes (MIRALAX) PACKET 1 Packet  1 Packet DAILY Mulugeta Pacheco, A.P.N.   1 Packet at 04/23/20 1029   • magnesium hydroxide (MILK OF MAGNESIA) suspension 30 mL  30 mL QDAY PRN Mulugeta M Olde, A.P.N.       • ampicillin/sulbactam (UNASYN) 3 g in  mL IVPB  3 g Q6HRS Krista Trejo M.D.   Stopped at 04/23/20 0611   • linezolid (ZYVOX) tablet 600 mg  600 mg Q12HRS H Santos Krishnamurthy M.D.   600 mg at 04/23/20 0540   • amitriptyline (ELAVIL) tablet 25 mg  25 mg HS PRN Silverio Lopez M.D.       •  atorvastatin (LIPITOR) tablet 40 mg  40 mg DAILY Silverio Lopez M.D.   40 mg at 04/23/20 0540   • ferrous gluconate (FERGON) tablet 324 mg  324 mg QDAY with Breakfast Silverio Lopez M.D.   324 mg at 04/23/20 0834   • sertraline (ZOLOFT) tablet 100 mg  100 mg DAILY Silverio Lopez M.D.   100 mg at 04/23/20 0540   • acetaminophen (TYLENOL) tablet 650 mg  650 mg Q6HRS PRN Silverio Lopez M.D.   650 mg at 04/17/20 1736   • ondansetron (ZOFRAN) syringe/vial injection 4 mg  4 mg Q4HRS MICHELLE Lopez M.D.       • ondansetron (ZOFRAN ODT) dispertab 4 mg  4 mg Q4HRS MICHELLE Lopez M.D.       • promethazine (PHENERGAN) tablet 12.5-25 mg  12.5-25 mg Q4HRS MICHELLE Lopez M.D.       • promethazine (PHENERGAN) suppository 12.5-25 mg  12.5-25 mg Q4HRS MICHELLE Lopez M.D.       • prochlorperazine (COMPAZINE) injection 5-10 mg  5-10 mg Q4HRS PRLEON Lopez M.D.       • heparin injection 5,000 Units  5,000 Units Q8HRS Mulugeta Pacheco A.P.N.   5,000 Units at 04/23/20 0834   • hydrALAZINE (APRESOLINE) injection 10 mg  10 mg Q6HRS PRLEON Lopez M.D.       • oxyCODONE immediate-release (ROXICODONE) tablet 5-10 mg  5-10 mg Q4HRS MICHELLE Lopez M.D.   10 mg at 04/23/20 1029   • morphine (pf) 4 MG/ML injection 2-4 mg  2-4 mg Q4HRS PRLEON Lopez M.D.   4 mg at 04/22/20 1423   Last reviewed on 4/22/2020 11:25 AM by Melody Ceron R.N.    Allergies   Allergen Reactions   • Norco [Hydrocodone-Acetaminophen] Itching       Physical Exam  Body mass index is 25 kg/m². /89   Pulse 69   Temp 37.1 °C (98.7 °F) (Temporal)   Resp 17   Ht 1.829 m (6')   Wt 83.6 kg (184 lb 4.9 oz)   SpO2 96%    Vitals:    04/22/20 1900 04/22/20 2000 04/23/20 0400 04/23/20 0800   BP:  116/85 131/83 135/89   Pulse:  70 67 69   Resp:  17 18 17   Temp:  37 °C (98.6 °F) 36.9 °C (98.4 °F) 37.1 °C (98.7 °F)   TempSrc:  Temporal Temporal Temporal   SpO2: 94% 93% 93% 96%   Weight:       Height:         Oxygen Therapy:  Pulse Oximetry: 96 %, O2 (LPM): 2, O2 Delivery Device: Oxymask    General: no apparent distress  HEENT: NA, AT, conjunctiva normal   Lungs: normal respiratory effort, CTAB  Heart: RRR, no murmurs  EXT:Chronic venous stasis changes noted bilaterally. S/p I&D- LLE bandaged and wound vac in place.  Abdomen: soft, non tender, non distended  Neurological: A/O x 3. No focal sensory deficits      Labs (personally reviewed and notable for):   Recent Results (from the past 24 hour(s))   GRAM STAIN    Collection Time: 04/22/20 12:03 PM   Result Value Ref Range    Significant Indicator .     Source WND     Site Left Leg     Gram Stain Result Moderate WBCs.  No organisms seen.      IRON/TOTAL IRON BIND    Collection Time: 04/22/20  4:57 PM   Result Value Ref Range    Iron 27 (L) 50 - 180 ug/dL    Total Iron Binding 131 (L) 250 - 450 ug/dL    Unsat Iron Binding 104 (L) 110 - 370 ug/dL    % Saturation 21 15 - 55 %   VITAMIN B12    Collection Time: 04/22/20  4:57 PM   Result Value Ref Range    Vitamin B12 -True Cobalamin 1374 (H) 211 - 911 pg/mL   Sed Rate    Collection Time: 04/23/20 12:33 AM   Result Value Ref Range    Sed Rate Westergren 83 (H) 0 - 20 mm/hour   Comp Metabolic Panel    Collection Time: 04/23/20 12:33 AM   Result Value Ref Range    Sodium 137 135 - 145 mmol/L    Potassium 4.8 3.6 - 5.5 mmol/L    Chloride 104 96 - 112 mmol/L    Co2 21 20 - 33 mmol/L    Anion Gap 12.0 7.0 - 16.0    Glucose 117 (H) 65 - 99 mg/dL    Bun 22 8 - 22 mg/dL    Creatinine 1.37 0.50 - 1.40 mg/dL    Calcium 7.7 (L) 8.5 - 10.5 mg/dL    AST(SGOT) 19 12 - 45 U/L    ALT(SGPT) 15 2 - 50 U/L    Alkaline Phosphatase 105 (H) 30 - 99 U/L    Total Bilirubin 0.4 0.1 - 1.5 mg/dL    Albumin 2.2 (L) 3.2 - 4.9 g/dL    Total Protein 6.8 6.0 - 8.2 g/dL    Globulin 4.6 (H) 1.9 - 3.5 g/dL    A-G Ratio 0.5 g/dL   CBC WITH DIFFERENTIAL    Collection Time: 04/23/20 12:33 AM   Result Value Ref Range    WBC 11.3 (H) 4.8 - 10.8 K/uL    RBC 3.40 (L)  4.70 - 6.10 M/uL    Hemoglobin 10.3 (L) 14.0 - 18.0 g/dL    Hematocrit 32.6 (L) 42.0 - 52.0 %    MCV 95.9 81.4 - 97.8 fL    MCH 30.3 27.0 - 33.0 pg    MCHC 31.6 (L) 33.7 - 35.3 g/dL    RDW 53.4 (H) 35.9 - 50.0 fL    Platelet Count 343 164 - 446 K/uL    MPV 8.7 (L) 9.0 - 12.9 fL    Neutrophils-Polys 88.50 (H) 44.00 - 72.00 %    Lymphocytes 3.70 (L) 22.00 - 41.00 %    Monocytes 6.70 0.00 - 13.40 %    Eosinophils 0.00 0.00 - 6.90 %    Basophils 0.30 0.00 - 1.80 %    Immature Granulocytes 0.80 0.00 - 0.90 %    Nucleated RBC 0.00 /100 WBC    Neutrophils (Absolute) 10.01 (H) 1.82 - 7.42 K/uL    Lymphs (Absolute) 0.42 (L) 1.00 - 4.80 K/uL    Monos (Absolute) 0.76 0.00 - 0.85 K/uL    Eos (Absolute) 0.00 0.00 - 0.51 K/uL    Baso (Absolute) 0.03 0.00 - 0.12 K/uL    Immature Granulocytes (abs) 0.09 0.00 - 0.11 K/uL    NRBC (Absolute) 0.00 K/uL   MAGNESIUM    Collection Time: 04/23/20 12:33 AM   Result Value Ref Range    Magnesium 2.0 1.5 - 2.5 mg/dL   PHOSPHORUS    Collection Time: 04/23/20 12:33 AM   Result Value Ref Range    Phosphorus 3.7 2.5 - 4.5 mg/dL   ESTIMATED GFR    Collection Time: 04/23/20 12:33 AM   Result Value Ref Range    GFR If African American >60 >60 mL/min/1.73 m 2    GFR If Non African American 52 (A) >60 mL/min/1.73 m 2           ASSESSMENT & PLAN    #LLE cellulitis- CT left lower extremity 4/21 showed no OM or drainable abscess   #Chronic venous stasis changes in bilateral lower extremities  #Polysubstance abuse with methamphetamines and cannabinoids  #Hepatitis C status post treatment    -s/p I&D 4/22  -pod #1  -Afebrile,mild leukocuytosis - likely from procedure  -Blood culture showed no growth to date  -Wound cultures positive for group A strep and MRSA  -Intraoperative wound cx pending  -Plan for OR for repeat I&D 4/24  -Continue linezolid and unasyn  -will f/u intraoperative cultures  -Monitor CBC while on linezolid  -Continue wound care and recommend follow-up with outpatient wound clinic    Case  discussed with Dr. Florez.

## 2020-04-23 NOTE — PROGRESS NOTES
"[facility] Medicine Daily Progress Note    Date of Service  4/23/2020    Chief Complaint  64 y.o. male admitted 4/16/2020 with Left leg redness and swelling.    Hospital Course    64 y.o. male who presented 4/16/2020 with left leg redness and swelling.  Patient has history of venous stasis dermatitis of both legs, and venous stasis ulcers of the left leg.  Patient states that he was discharged from wound care a month ago and said the ulcers had resolved.  But then a week ago he noted that ulcers came back he also noticed increasing redness and swelling and pain.  He denied having any fever or chills, cough or congestion, shortness of breath, abdominal pain, nausea or vomiting, or diarrhea.  On review of his previous wound cultures, he has history of MRSA, Pseudomonas, Serratia, and group B streptococcus.    Cultures on this admissioon grew MRSA and GAS. ID has been following his care. He was eventually transitioned to oral Zyvox. He was improving. His WBCs normalized, he remained afebrile, and he was looking stable for discharge home with antibiotics and wound care. However, after wound care changed the dressing 4/21, mian pus was noted from a tunneling lesion, suggesting need for further I&D.  Patient was taken to the operating room by Dr. Israel.  Initial reports from healthcare personnel suggest there was some component of \"necrotizing fat\".  Antibiotics were broadened slightly to include Unasyn.  The case was discussed with infectious disease who will be following closely.    Interval Problem Update  VSS and WNL overnight. No acute events  S/P I&D with cultures  Inflammatory markers decreasing  Serum creatinine has increased slightly--may be related to contrast from the CT--fluids started at low rate to help with clearance/perfusion  Iron studies equivocal     Consultants/Specialty  I.D  LPS  ortho    Code Status  full    Disposition  pending    Review of Systems  Review of Systems   Constitutional: Negative for " chills, malaise/fatigue and weight loss.   HENT: Negative for ear discharge, ear pain and nosebleeds.    Eyes: Negative for double vision, photophobia and pain.   Respiratory: Negative for hemoptysis, sputum production and shortness of breath.    Cardiovascular: Negative for chest pain, palpitations and orthopnea.   Gastrointestinal: Negative for abdominal pain, nausea and vomiting.   Genitourinary: Negative for frequency, hematuria and urgency.   Musculoskeletal: Negative for back pain, myalgias and neck pain.        Left leg pain   Skin: Negative for itching.   Neurological: Negative for dizziness, tingling and headaches.        Physical Exam  Temp:  [36.4 °C (97.5 °F)-37.4 °C (99.4 °F)] 37.1 °C (98.7 °F)  Pulse:  [] 69  Resp:  [17-28] 17  BP: ()/(47-98) 135/89  SpO2:  [92 %-100 %] 96 %    Physical Exam  Vitals signs and nursing note reviewed.   Constitutional:       Interventions: Face mask in place.   HENT:      Head: Normocephalic and atraumatic.      Nose: Nose normal.   Eyes:      Conjunctiva/sclera: Conjunctivae normal.      Pupils: Pupils are equal, round, and reactive to light.   Neck:      Musculoskeletal: Neck supple.   Cardiovascular:      Rate and Rhythm: Normal rate and regular rhythm.      Heart sounds: Murmur present. Systolic murmur present with a grade of 1/6. No friction rub. No S3 or S4 sounds.    Pulmonary:      Effort: No respiratory distress.   Abdominal:      General: Bowel sounds are normal. There is no distension.      Palpations: Abdomen is soft.   Musculoskeletal:      Right lower leg: No edema.      Left lower leg: No edema.      Comments: LLE with dressing with scant amount of discharge    Skin:     General: Skin is warm and dry.   Neurological:      Mental Status: He is alert.         Fluids    Intake/Output Summary (Last 24 hours) at 4/23/2020 1023  Last data filed at 4/23/2020 0700  Gross per 24 hour   Intake 400 ml   Output 650 ml   Net -250 ml       Laboratory  Recent  Labs     04/21/20  0358 04/22/20  0049 04/23/20  0033   WBC 7.3 7.2 11.3*   RBC 3.24* 3.26* 3.40*   HEMOGLOBIN 9.9* 10.0* 10.3*   HEMATOCRIT 30.6* 30.7* 32.6*   MCV 94.4 94.2 95.9   MCH 30.6 30.7 30.3   MCHC 32.4* 32.6* 31.6*   RDW 53.6* 53.8* 53.4*   PLATELETCT 311 312 343   MPV 9.0 8.8* 8.7*     Recent Labs     04/21/20  0358 04/22/20  0049 04/23/20  0033   SODIUM 136 135 137   POTASSIUM 5.0 5.0 4.8   CHLORIDE 106 103 104   CO2 21 22 21   GLUCOSE 106* 103* 117*   BUN 24* 20 22   CREATININE 1.12 1.12 1.37   CALCIUM 7.8* 7.6* 7.7*                   Imaging   4/21/2020 5:16 PM     HISTORY/REASON FOR EXAM:  Lower leg pain, suspected osteomyelitis.     TECHNIQUE/EXAM DESCRIPTION AND NUMBER OF VIEWS:  CT scan of the LEFT lower extremity with contrast, with reconstructions.     Thin helical 3 mm sections were obtained from the distal femur through the proximal tibia/fibula. Sagittal and coronal multiplanar reconstructions were generated from the axial images. A total of 100 mL of Omnipaque 350 nonionic contrast was administered   IV without complication.     Up to date radiation dose reduction adjustments have been utilized to meet ALARA standards for radiation dose reduction.     COMPARISON: 4/16/2020.     FINDINGS:  No acute fracture or dislocation is identified.     Cellulitis of the lower leg, with soft tissue prominence and subcutaneous air. No subjacent cortical bone destruction to suggest osteomyelitis. No drainable abscess.     There is chronic appearing valgus deviation of the foot at the subtalar joint, and there is suspected pes planovalgus. Moderate subtalar osteoarthrosis. Multifocal mild to moderate osteoarthrosis in the midfoot. Mild multifocal interphalangeal   osteoarthrosis.     Reactive popliteal lymphadenopathy.     IMPRESSION:        1. No evidence of osteomyelitis.  2. Cellulitis and soft tissue organs of the left lower leg. Subcutaneous air without drainable abscess.  3. Suspected pes planovalgus  deformity. Multifocal osteoarthrosis of the midfoot.    Assessment/Plan  * Cellulitis of left lower extremity- (present on admission)  Assessment & Plan  Status post I&D 4/22/2020  Zyvox and Unasyn per ID  Follow deep operative cultures  Plan for repeat washout 4/24/2020.  N.p.o. at midnight and stop heparin.  -Arterial ultrasound on 12/27/2019 showed:No evidence of arterial insufficiency in either lower extremity       Venous stasis ulcer of left lower extremity (HCC)- (present on admission)  Assessment & Plan  Likely need for NPWT  Further direction from Ortho/LPS/Wound Team    Venous stasis dermatitis of both lower extremities- (present on admission)  Assessment & Plan  Wound care ongoing    CKD (chronic kidney disease) stage 3, GFR 30-59 ml/min ([facility])- (present on admission)  Assessment & Plan  Acute on chronic  At baseline now      Essential hypertension- (present on admission)  Assessment & Plan  Controlled  IV Hydralazine as needed with parameters    Normocytic anemia  Assessment & Plan  TIBC    Methamphetamine abuse (HCC)- (present on admission)  Assessment & Plan  Positive  UDS    Alcoholism (HCC)- (present on admission)  Assessment & Plan  Check B12, B1  Daily Mag, Phos, BMP         VTE prophylaxis: heparin through today

## 2020-04-24 ENCOUNTER — ANESTHESIA EVENT (OUTPATIENT)
Dept: SURGERY | Facility: MEDICAL CENTER | Age: 65
DRG: 571 | End: 2020-04-24
Payer: MEDICAID

## 2020-04-24 ENCOUNTER — ANESTHESIA (OUTPATIENT)
Dept: SURGERY | Facility: MEDICAL CENTER | Age: 65
DRG: 571 | End: 2020-04-24
Payer: MEDICAID

## 2020-04-24 LAB
ALBUMIN SERPL BCP-MCNC: 2.3 G/DL (ref 3.2–4.9)
ALBUMIN/GLOB SERPL: 0.5 G/DL
ALP SERPL-CCNC: 99 U/L (ref 30–99)
ALT SERPL-CCNC: 12 U/L (ref 2–50)
ANION GAP SERPL CALC-SCNC: 10 MMOL/L (ref 7–16)
AST SERPL-CCNC: 17 U/L (ref 12–45)
BACTERIA WND AEROBE CULT: ABNORMAL
BASOPHILS # BLD AUTO: 0.2 % (ref 0–1.8)
BASOPHILS # BLD: 0.02 K/UL (ref 0–0.12)
BILIRUB SERPL-MCNC: 0.4 MG/DL (ref 0.1–1.5)
BUN SERPL-MCNC: 24 MG/DL (ref 8–22)
CALCIUM SERPL-MCNC: 7.7 MG/DL (ref 8.5–10.5)
CHLORIDE SERPL-SCNC: 106 MMOL/L (ref 96–112)
CO2 SERPL-SCNC: 23 MMOL/L (ref 20–33)
CREAT SERPL-MCNC: 1.22 MG/DL (ref 0.5–1.4)
EOSINOPHIL # BLD AUTO: 0.13 K/UL (ref 0–0.51)
EOSINOPHIL NFR BLD: 1.4 % (ref 0–6.9)
ERYTHROCYTE [DISTWIDTH] IN BLOOD BY AUTOMATED COUNT: 56 FL (ref 35.9–50)
ERYTHROCYTE [SEDIMENTATION RATE] IN BLOOD BY WESTERGREN METHOD: >120 MM/HOUR (ref 0–20)
GLOBULIN SER CALC-MCNC: 4.5 G/DL (ref 1.9–3.5)
GLUCOSE SERPL-MCNC: 94 MG/DL (ref 65–99)
GRAM STN SPEC: ABNORMAL
HCT VFR BLD AUTO: 31.2 % (ref 42–52)
HGB BLD-MCNC: 10 G/DL (ref 14–18)
IMM GRANULOCYTES # BLD AUTO: 0.09 K/UL (ref 0–0.11)
IMM GRANULOCYTES NFR BLD AUTO: 0.9 % (ref 0–0.9)
LYMPHOCYTES # BLD AUTO: 0.92 K/UL (ref 1–4.8)
LYMPHOCYTES NFR BLD: 9.6 % (ref 22–41)
MAGNESIUM SERPL-MCNC: 2 MG/DL (ref 1.5–2.5)
MCH RBC QN AUTO: 30.7 PG (ref 27–33)
MCHC RBC AUTO-ENTMCNC: 32.1 G/DL (ref 33.7–35.3)
MCV RBC AUTO: 95.7 FL (ref 81.4–97.8)
MONOCYTES # BLD AUTO: 0.66 K/UL (ref 0–0.85)
MONOCYTES NFR BLD AUTO: 6.9 % (ref 0–13.4)
NEUTROPHILS # BLD AUTO: 7.72 K/UL (ref 1.82–7.42)
NEUTROPHILS NFR BLD: 81 % (ref 44–72)
NRBC # BLD AUTO: 0 K/UL
NRBC BLD-RTO: 0 /100 WBC
PHOSPHATE SERPL-MCNC: 3.4 MG/DL (ref 2.5–4.5)
PLATELET # BLD AUTO: 347 K/UL (ref 164–446)
PMV BLD AUTO: 8.5 FL (ref 9–12.9)
POTASSIUM SERPL-SCNC: 5.1 MMOL/L (ref 3.6–5.5)
PROT SERPL-MCNC: 6.8 G/DL (ref 6–8.2)
RBC # BLD AUTO: 3.26 M/UL (ref 4.7–6.1)
SIGNIFICANT IND 70042: ABNORMAL
SITE SITE: ABNORMAL
SODIUM SERPL-SCNC: 139 MMOL/L (ref 135–145)
SOURCE SOURCE: ABNORMAL
WBC # BLD AUTO: 9.5 K/UL (ref 4.8–10.8)

## 2020-04-24 PROCEDURE — 700111 HCHG RX REV CODE 636 W/ 250 OVERRIDE (IP): Performed by: INTERNAL MEDICINE

## 2020-04-24 PROCEDURE — 0JBP0ZZ EXCISION OF LEFT LOWER LEG SUBCUTANEOUS TISSUE AND FASCIA, OPEN APPROACH: ICD-10-PCS | Performed by: ORTHOPAEDIC SURGERY

## 2020-04-24 PROCEDURE — A6454 SELF-ADHER BAND W>=3" <5"/YD: HCPCS | Performed by: ORTHOPAEDIC SURGERY

## 2020-04-24 PROCEDURE — 700105 HCHG RX REV CODE 258: Performed by: NURSE PRACTITIONER

## 2020-04-24 PROCEDURE — 99232 SBSQ HOSP IP/OBS MODERATE 35: CPT | Performed by: INTERNAL MEDICINE

## 2020-04-24 PROCEDURE — 700101 HCHG RX REV CODE 250: Performed by: ANESTHESIOLOGY

## 2020-04-24 PROCEDURE — 500452 HCHG DRESSING, WOUND VAC MED.: Performed by: ORTHOPAEDIC SURGERY

## 2020-04-24 PROCEDURE — A9270 NON-COVERED ITEM OR SERVICE: HCPCS | Performed by: FAMILY MEDICINE

## 2020-04-24 PROCEDURE — 700102 HCHG RX REV CODE 250 W/ 637 OVERRIDE(OP): Performed by: FAMILY MEDICINE

## 2020-04-24 PROCEDURE — 99233 SBSQ HOSP IP/OBS HIGH 50: CPT | Mod: GC | Performed by: INTERNAL MEDICINE

## 2020-04-24 PROCEDURE — 160027 HCHG SURGERY MINUTES - 1ST 30 MINS LEVEL 2: Performed by: ORTHOPAEDIC SURGERY

## 2020-04-24 PROCEDURE — 501838 HCHG SUTURE GENERAL: Performed by: ORTHOPAEDIC SURGERY

## 2020-04-24 PROCEDURE — 770021 HCHG ROOM/CARE - ISO PRIVATE

## 2020-04-24 PROCEDURE — 160038 HCHG SURGERY MINUTES - EA ADDL 1 MIN LEVEL 2: Performed by: ORTHOPAEDIC SURGERY

## 2020-04-24 PROCEDURE — 160048 HCHG OR STATISTICAL LEVEL 1-5: Performed by: ORTHOPAEDIC SURGERY

## 2020-04-24 PROCEDURE — 700102 HCHG RX REV CODE 250 W/ 637 OVERRIDE(OP): Performed by: NURSE PRACTITIONER

## 2020-04-24 PROCEDURE — 84100 ASSAY OF PHOSPHORUS: CPT

## 2020-04-24 PROCEDURE — 160009 HCHG ANES TIME/MIN: Performed by: ORTHOPAEDIC SURGERY

## 2020-04-24 PROCEDURE — 80053 COMPREHEN METABOLIC PANEL: CPT

## 2020-04-24 PROCEDURE — 700102 HCHG RX REV CODE 250 W/ 637 OVERRIDE(OP): Performed by: ANESTHESIOLOGY

## 2020-04-24 PROCEDURE — A6223 GAUZE >16<=48 NO W/SAL W/O B: HCPCS | Performed by: ORTHOPAEDIC SURGERY

## 2020-04-24 PROCEDURE — 700111 HCHG RX REV CODE 636 W/ 250 OVERRIDE (IP): Performed by: ANESTHESIOLOGY

## 2020-04-24 PROCEDURE — 83735 ASSAY OF MAGNESIUM: CPT

## 2020-04-24 PROCEDURE — 700105 HCHG RX REV CODE 258: Performed by: INTERNAL MEDICINE

## 2020-04-24 PROCEDURE — 500881 HCHG PACK, EXTREMITY: Performed by: ORTHOPAEDIC SURGERY

## 2020-04-24 PROCEDURE — A9270 NON-COVERED ITEM OR SERVICE: HCPCS | Performed by: ANESTHESIOLOGY

## 2020-04-24 PROCEDURE — 160035 HCHG PACU - 1ST 60 MINS PHASE I: Performed by: ORTHOPAEDIC SURGERY

## 2020-04-24 PROCEDURE — 85025 COMPLETE CBC W/AUTO DIFF WBC: CPT

## 2020-04-24 PROCEDURE — 700111 HCHG RX REV CODE 636 W/ 250 OVERRIDE (IP): Performed by: NURSE PRACTITIONER

## 2020-04-24 PROCEDURE — 160002 HCHG RECOVERY MINUTES (STAT): Performed by: ORTHOPAEDIC SURGERY

## 2020-04-24 PROCEDURE — 501330 HCHG SET, CYSTO IRRIG TUBING: Performed by: ORTHOPAEDIC SURGERY

## 2020-04-24 PROCEDURE — A9270 NON-COVERED ITEM OR SERVICE: HCPCS | Performed by: NURSE PRACTITIONER

## 2020-04-24 PROCEDURE — 36415 COLL VENOUS BLD VENIPUNCTURE: CPT

## 2020-04-24 PROCEDURE — 700105 HCHG RX REV CODE 258: Performed by: ANESTHESIOLOGY

## 2020-04-24 PROCEDURE — 85652 RBC SED RATE AUTOMATED: CPT

## 2020-04-24 RX ORDER — CEFAZOLIN SODIUM 1 G/3ML
INJECTION, POWDER, FOR SOLUTION INTRAMUSCULAR; INTRAVENOUS PRN
Status: DISCONTINUED | OUTPATIENT
Start: 2020-04-24 | End: 2020-04-24 | Stop reason: SURG

## 2020-04-24 RX ORDER — OXYCODONE HCL 5 MG/5 ML
5 SOLUTION, ORAL ORAL
Status: DISCONTINUED | OUTPATIENT
Start: 2020-04-24 | End: 2020-04-24 | Stop reason: HOSPADM

## 2020-04-24 RX ORDER — HYDROMORPHONE HYDROCHLORIDE 1 MG/ML
0.4 INJECTION, SOLUTION INTRAMUSCULAR; INTRAVENOUS; SUBCUTANEOUS
Status: DISCONTINUED | OUTPATIENT
Start: 2020-04-24 | End: 2020-04-24 | Stop reason: HOSPADM

## 2020-04-24 RX ORDER — ONDANSETRON 2 MG/ML
4 INJECTION INTRAMUSCULAR; INTRAVENOUS
Status: DISCONTINUED | OUTPATIENT
Start: 2020-04-24 | End: 2020-04-24 | Stop reason: HOSPADM

## 2020-04-24 RX ORDER — IPRATROPIUM BROMIDE AND ALBUTEROL SULFATE 2.5; .5 MG/3ML; MG/3ML
3 SOLUTION RESPIRATORY (INHALATION)
Status: DISCONTINUED | OUTPATIENT
Start: 2020-04-24 | End: 2020-04-24 | Stop reason: HOSPADM

## 2020-04-24 RX ORDER — DEXAMETHASONE SODIUM PHOSPHATE 4 MG/ML
INJECTION, SOLUTION INTRA-ARTICULAR; INTRALESIONAL; INTRAMUSCULAR; INTRAVENOUS; SOFT TISSUE PRN
Status: DISCONTINUED | OUTPATIENT
Start: 2020-04-24 | End: 2020-04-24 | Stop reason: SURG

## 2020-04-24 RX ORDER — HEPARIN SODIUM 5000 [USP'U]/ML
5000 INJECTION, SOLUTION INTRAVENOUS; SUBCUTANEOUS EVERY 8 HOURS
Status: DISPENSED | OUTPATIENT
Start: 2020-04-25 | End: 2020-04-26

## 2020-04-24 RX ORDER — OXYCODONE HCL 10 MG/1
20 TABLET, FILM COATED, EXTENDED RELEASE ORAL ONCE
Status: COMPLETED | OUTPATIENT
Start: 2020-04-24 | End: 2020-04-24

## 2020-04-24 RX ORDER — SODIUM CHLORIDE, SODIUM GLUCONATE, SODIUM ACETATE, POTASSIUM CHLORIDE AND MAGNESIUM CHLORIDE 526; 502; 368; 37; 30 MG/100ML; MG/100ML; MG/100ML; MG/100ML; MG/100ML
500 INJECTION, SOLUTION INTRAVENOUS CONTINUOUS
Status: DISCONTINUED | OUTPATIENT
Start: 2020-04-24 | End: 2020-04-24 | Stop reason: HOSPADM

## 2020-04-24 RX ORDER — KETOROLAC TROMETHAMINE 30 MG/ML
INJECTION, SOLUTION INTRAMUSCULAR; INTRAVENOUS PRN
Status: DISCONTINUED | OUTPATIENT
Start: 2020-04-24 | End: 2020-04-24 | Stop reason: SURG

## 2020-04-24 RX ORDER — ONDANSETRON 2 MG/ML
INJECTION INTRAMUSCULAR; INTRAVENOUS PRN
Status: DISCONTINUED | OUTPATIENT
Start: 2020-04-24 | End: 2020-04-24 | Stop reason: SURG

## 2020-04-24 RX ORDER — MIDAZOLAM HYDROCHLORIDE 1 MG/ML
1 INJECTION INTRAMUSCULAR; INTRAVENOUS
Status: DISCONTINUED | OUTPATIENT
Start: 2020-04-24 | End: 2020-04-24 | Stop reason: HOSPADM

## 2020-04-24 RX ORDER — SODIUM CHLORIDE, SODIUM LACTATE, POTASSIUM CHLORIDE, CALCIUM CHLORIDE 600; 310; 30; 20 MG/100ML; MG/100ML; MG/100ML; MG/100ML
INJECTION, SOLUTION INTRAVENOUS
Status: DISCONTINUED | OUTPATIENT
Start: 2020-04-24 | End: 2020-04-24 | Stop reason: SURG

## 2020-04-24 RX ORDER — MEPERIDINE HYDROCHLORIDE 25 MG/ML
12.5 INJECTION INTRAMUSCULAR; INTRAVENOUS; SUBCUTANEOUS
Status: DISCONTINUED | OUTPATIENT
Start: 2020-04-24 | End: 2020-04-24 | Stop reason: HOSPADM

## 2020-04-24 RX ORDER — HALOPERIDOL 5 MG/ML
1 INJECTION INTRAMUSCULAR
Status: DISCONTINUED | OUTPATIENT
Start: 2020-04-24 | End: 2020-04-24 | Stop reason: HOSPADM

## 2020-04-24 RX ORDER — HYDROMORPHONE HYDROCHLORIDE 1 MG/ML
0.2 INJECTION, SOLUTION INTRAMUSCULAR; INTRAVENOUS; SUBCUTANEOUS
Status: DISCONTINUED | OUTPATIENT
Start: 2020-04-24 | End: 2020-04-24 | Stop reason: HOSPADM

## 2020-04-24 RX ORDER — HYDROMORPHONE HYDROCHLORIDE 1 MG/ML
1 INJECTION, SOLUTION INTRAMUSCULAR; INTRAVENOUS; SUBCUTANEOUS
Status: DISCONTINUED | OUTPATIENT
Start: 2020-04-24 | End: 2020-04-24 | Stop reason: HOSPADM

## 2020-04-24 RX ORDER — DIPHENHYDRAMINE HYDROCHLORIDE 50 MG/ML
12.5 INJECTION INTRAMUSCULAR; INTRAVENOUS
Status: DISCONTINUED | OUTPATIENT
Start: 2020-04-24 | End: 2020-04-24 | Stop reason: HOSPADM

## 2020-04-24 RX ORDER — OXYCODONE HCL 5 MG/5 ML
10 SOLUTION, ORAL ORAL
Status: DISCONTINUED | OUTPATIENT
Start: 2020-04-24 | End: 2020-04-24 | Stop reason: HOSPADM

## 2020-04-24 RX ADMIN — DEXAMETHASONE SODIUM PHOSPHATE 8 MG: 4 INJECTION, SOLUTION INTRA-ARTICULAR; INTRALESIONAL; INTRAMUSCULAR; INTRAVENOUS; SOFT TISSUE at 11:45

## 2020-04-24 RX ADMIN — PIPERACILLIN AND TAZOBACTAM 4.5 G: 4; .5 INJECTION, POWDER, LYOPHILIZED, FOR SOLUTION INTRAVENOUS; PARENTERAL at 05:00

## 2020-04-24 RX ADMIN — LIDOCAINE HYDROCHLORIDE 50 MG: 20 INJECTION, SOLUTION INTRAVENOUS at 11:40

## 2020-04-24 RX ADMIN — SENNOSIDES AND DOCUSATE SODIUM 1 TABLET: 8.6; 5 TABLET ORAL at 04:59

## 2020-04-24 RX ADMIN — OXYCODONE 10 MG: 5 TABLET ORAL at 00:22

## 2020-04-24 RX ADMIN — OXYCODONE 10 MG: 5 TABLET ORAL at 05:00

## 2020-04-24 RX ADMIN — SENNOSIDES AND DOCUSATE SODIUM 1 TABLET: 8.6; 5 TABLET ORAL at 18:18

## 2020-04-24 RX ADMIN — PIPERACILLIN AND TAZOBACTAM 4.5 G: 4; .5 INJECTION, POWDER, LYOPHILIZED, FOR SOLUTION INTRAVENOUS; PARENTERAL at 21:40

## 2020-04-24 RX ADMIN — SUGAMMADEX 200 MG: 100 INJECTION, SOLUTION INTRAVENOUS at 12:16

## 2020-04-24 RX ADMIN — FENTANYL CITRATE 50 MCG: 0.05 INJECTION, SOLUTION INTRAMUSCULAR; INTRAVENOUS at 12:42

## 2020-04-24 RX ADMIN — SODIUM CHLORIDE, POTASSIUM CHLORIDE, SODIUM LACTATE AND CALCIUM CHLORIDE: 600; 310; 30; 20 INJECTION, SOLUTION INTRAVENOUS at 11:33

## 2020-04-24 RX ADMIN — CEFAZOLIN 2 G: 330 INJECTION, POWDER, FOR SOLUTION INTRAMUSCULAR; INTRAVENOUS at 11:33

## 2020-04-24 RX ADMIN — LINEZOLID 600 MG: 600 TABLET, FILM COATED ORAL at 18:18

## 2020-04-24 RX ADMIN — KETOROLAC TROMETHAMINE 30 MG: 30 INJECTION, SOLUTION INTRAMUSCULAR at 12:16

## 2020-04-24 RX ADMIN — PROPOFOL 150 MG: 10 INJECTION, EMULSION INTRAVENOUS at 11:40

## 2020-04-24 RX ADMIN — SODIUM CHLORIDE: 9 INJECTION, SOLUTION INTRAVENOUS at 05:00

## 2020-04-24 RX ADMIN — OXYCODONE 10 MG: 5 TABLET ORAL at 09:12

## 2020-04-24 RX ADMIN — SERTRALINE HYDROCHLORIDE 100 MG: 100 TABLET ORAL at 05:00

## 2020-04-24 RX ADMIN — LINEZOLID 600 MG: 600 TABLET, FILM COATED ORAL at 05:00

## 2020-04-24 RX ADMIN — PIPERACILLIN AND TAZOBACTAM 4.5 G: 4; .5 INJECTION, POWDER, LYOPHILIZED, FOR SOLUTION INTRAVENOUS; PARENTERAL at 14:11

## 2020-04-24 RX ADMIN — MAGNESIUM HYDROXIDE 30 ML: 400 SUSPENSION ORAL at 18:18

## 2020-04-24 RX ADMIN — HEPARIN SODIUM 5000 UNITS: 5000 INJECTION, SOLUTION INTRAVENOUS; SUBCUTANEOUS at 00:22

## 2020-04-24 RX ADMIN — OXYCODONE 10 MG: 5 TABLET ORAL at 20:04

## 2020-04-24 RX ADMIN — ATORVASTATIN CALCIUM 40 MG: 40 TABLET, FILM COATED ORAL at 05:00

## 2020-04-24 RX ADMIN — ONDANSETRON 4 MG: 2 INJECTION INTRAMUSCULAR; INTRAVENOUS at 12:16

## 2020-04-24 RX ADMIN — OXYCODONE HYDROCHLORIDE 20 MG: 10 TABLET, FILM COATED, EXTENDED RELEASE ORAL at 12:31

## 2020-04-24 RX ADMIN — FENTANYL CITRATE 50 MCG: 0.05 INJECTION, SOLUTION INTRAMUSCULAR; INTRAVENOUS at 12:29

## 2020-04-24 RX ADMIN — ROCURONIUM BROMIDE 50 MG: 10 INJECTION, SOLUTION INTRAVENOUS at 11:40

## 2020-04-24 ASSESSMENT — PAIN SCALES - GENERAL: PAIN_LEVEL: 5

## 2020-04-24 ASSESSMENT — ENCOUNTER SYMPTOMS
EYE PAIN: 0
ORTHOPNEA: 0
MYALGIAS: 0
HEADACHES: 0
DOUBLE VISION: 0
PHOTOPHOBIA: 0
HEMOPTYSIS: 0
NAUSEA: 0
SHORTNESS OF BREATH: 0
CHILLS: 0
ABDOMINAL PAIN: 0
WEIGHT LOSS: 0
TINGLING: 0
PALPITATIONS: 0
BACK PAIN: 0
VOMITING: 0
NECK PAIN: 0
SPUTUM PRODUCTION: 0
DIZZINESS: 0

## 2020-04-24 NOTE — PROGRESS NOTES
1050: Pt off the floor to OR for I+D    1315: Pt returned to the floor.     [id number]: Aox4. VSS on 6L, post-op.  monitor in place. C/o moderate pain to left leg, given PO oxycodone 10mg with good effect. Continent b/b, up to the toilet w/ SBA ad cane. Small BM this shift. Skin per flowsheet. Safety maintained, bed alarm set. WCTM.

## 2020-04-24 NOTE — PROGRESS NOTES
"[facility] Medicine Daily Progress Note    Date of Service  4/24/2020    Chief Complaint  64 y.o. male admitted 4/16/2020 with Left leg redness and swelling.    Hospital Course    64 y.o. male who presented 4/16/2020 with left leg redness and swelling.  Patient has history of venous stasis dermatitis of both legs, and venous stasis ulcers of the left leg.  Patient states that he was discharged from wound care a month ago and said the ulcers had resolved.  But then a week ago he noted that ulcers came back he also noticed increasing redness and swelling and pain.  He denied having any fever or chills, cough or congestion, shortness of breath, abdominal pain, nausea or vomiting, or diarrhea.  On review of his previous wound cultures, he has history of MRSA, Pseudomonas, Serratia, and group B streptococcus.    Cultures on this admissioon grew MRSA and GAS. ID has been following his care. He was eventually transitioned to oral Zyvox. He was improving. His WBCs normalized, he remained afebrile, and he was looking stable for discharge home with antibiotics and wound care. However, after wound care changed the dressing 4/21, mian pus was noted from a tunneling lesion, suggesting need for further I&D.  Patient was taken to the operating room by Dr. Israel.  Initial reports from healthcare personnel suggest there was some component of \"necrotizing fat\".  Antibiotics were broadened slightly to include Unasyn.  The case was discussed with infectious disease who will be following closely.    Interval Problem Update  VSS and WNL   To OR again today for washout/VAC change  WBC is normal  Anemia stable  Sed rate again quite elevated  Renal numbers stable  Culture of leg growing Pseudomonas and GAS--now on Zosyn/Zyvox  With no plans to evaluate for possible surgery again next week, possibly Wednesday    Consultants/Specialty  I.D  LPS  ortho    Code Status  full    Disposition  pending    Review of Systems  Review of Systems "   Constitutional: Negative for chills, malaise/fatigue and weight loss.   HENT: Negative for ear discharge, ear pain and nosebleeds.    Eyes: Negative for double vision, photophobia and pain.   Respiratory: Negative for hemoptysis, sputum production and shortness of breath.    Cardiovascular: Negative for chest pain, palpitations and orthopnea.   Gastrointestinal: Negative for abdominal pain, nausea and vomiting.   Genitourinary: Negative for frequency, hematuria and urgency.   Musculoskeletal: Negative for back pain, myalgias and neck pain.        Left leg pain   Skin: Negative for itching.   Neurological: Negative for dizziness, tingling and headaches.        Physical Exam  Temp:  [36.2 °C (97.2 °F)-37.5 °C (99.5 °F)] 36.7 °C (98 °F)  Pulse:  [61-81] 61  Resp:  [15-19] 16  BP: (142-162)/(83-96) 153/85  SpO2:  [92 %-100 %] 100 %    Physical Exam  Vitals signs and nursing note reviewed.   Constitutional:       Interventions: Face mask in place.   HENT:      Head: Normocephalic and atraumatic.      Nose: Nose normal.   Eyes:      Conjunctiva/sclera: Conjunctivae normal.      Pupils: Pupils are equal, round, and reactive to light.   Neck:      Musculoskeletal: Neck supple.   Cardiovascular:      Rate and Rhythm: Normal rate and regular rhythm.      Heart sounds: Murmur present. Systolic murmur present with a grade of 1/6. No friction rub. No S3 or S4 sounds.    Pulmonary:      Effort: No respiratory distress.   Abdominal:      General: Bowel sounds are normal. There is no distension.      Palpations: Abdomen is soft.   Musculoskeletal:      Right lower leg: No edema.      Left lower leg: No edema.      Comments: LLE with dressing with scant amount of discharge    Skin:     General: Skin is warm and dry.   Neurological:      Mental Status: He is alert.         Fluids    Intake/Output Summary (Last 24 hours) at 4/24/2020 1357  Last data filed at 4/24/2020 1330  Gross per 24 hour   Intake 2156 ml   Output 1400 ml   Net  756 ml       Laboratory  Recent Labs     04/22/20 0049 04/23/20 0033 04/24/20 0222   WBC 7.2 11.3* 9.5   RBC 3.26* 3.40* 3.26*   HEMOGLOBIN 10.0* 10.3* 10.0*   HEMATOCRIT 30.7* 32.6* 31.2*   MCV 94.2 95.9 95.7   MCH 30.7 30.3 30.7   MCHC 32.6* 31.6* 32.1*   RDW 53.8* 53.4* 56.0*   PLATELETCT 312 343 347   MPV 8.8* 8.7* 8.5*     Recent Labs     04/22/20 0049 04/23/20 0033 04/24/20 0222   SODIUM 135 137 139   POTASSIUM 5.0 4.8 5.1   CHLORIDE 103 104 106   CO2 22 21 23   GLUCOSE 103* 117* 94   BUN 20 22 24*   CREATININE 1.12 1.37 1.22   CALCIUM 7.6* 7.7* 7.7*                   Imaging     Assessment/Plan  * Cellulitis of left lower extremity- (present on admission)  Assessment & Plan  Zyvox and Zosyn per ID  Follow deep operative cultures  S/P washout 4/22 and 4/24    -Arterial ultrasound on 12/27/2019 showed:No evidence of arterial insufficiency in either lower extremity       Venous stasis ulcer of left lower extremity (HCC)- (present on admission)  Assessment & Plan  Further direction from Ortho/LPS/Wound Team    Venous stasis dermatitis of both lower extremities- (present on admission)  Assessment & Plan  Wound care ongoing    CKD (chronic kidney disease) stage 3, GFR 30-59 ml/min (HCC)- (present on admission)  Assessment & Plan  Acute on chronic  At baseline now  Stop IV fluids    Essential hypertension- (present on admission)  Assessment & Plan  Controlled  IV Hydralazine as needed with parameters    Normocytic anemia  Assessment & Plan  TIBC    Methamphetamine abuse (HCC)- (present on admission)  Assessment & Plan  Positive  UDS    Alcoholism (HCC)- (present on admission)  Assessment & Plan  B12 supratherapeutic, B1 still pending  Daily Mag, Phos, BMP         VTE prophylaxis: heparin starting 4/25

## 2020-04-24 NOTE — ANESTHESIA PREPROCEDURE EVALUATION
Relevant Problems   NEURO   (+) History of prediabetes      CARDIAC   (+) Chronic migraine without aura without status migrainosus, not intractable   (+) Essential hypertension   (+) Pulmonary hypertension (HCC)      GI   (+) Erosive gastritis   (+) Gastroesophageal reflux disease with esophagitis         (+) CKD (chronic kidney disease) stage 3, GFR 30-59 ml/min (HCC)   (+) Chronic hepatitis C without hepatic coma (HCC)      Other   (+) Charcot's joint of left foot, non-diabetic       Physical Exam    Airway   Mallampati: II  TM distance: >3 FB  Neck ROM: limited       Cardiovascular - normal exam  Rhythm: regular  Rate: normal  (-) murmur     Dental - normal exam         Pulmonary - normal exam  Breath sounds clear to auscultation     Abdominal    Neurological - normal exam         Other findings: Multiple medical problems, substance abuse, chart reviewed, chronic pain            Anesthesia Plan    ASA 3- EMERGENT       Plan - general       Airway plan will be LMA        Induction: intravenous    Postoperative Plan: Postoperative administration of opioids is intended.    Pertinent diagnostic labs and testing reviewed    Informed Consent:    Anesthetic plan and risks discussed with patient.    Use of blood products discussed with: patient whom consented to blood products.

## 2020-04-24 NOTE — OP REPORT
DATE OF SERVICE:  04/24/2020    PREOPERATIVE DIAGNOSES:  Left leg cellulitis and abscess.    POSTOPERATIVE DIAGNOSES:  Left leg cellulitis and abscess.    PROCEDURE PERFORMED:  1.  Left leg irrigation and debridement, multiple compartments.  2.  Placement of wound VAC.    SURGEON:  Barrie Israel MD    FIRST ASSISTANT:  ____.    ANESTHESIA:  General endotracheal.    ESTIMATED BLOOD LOSS:  None.    COMPLICATIONS:  None.    POSTOPERATIVE PLAN:  1.  Continue wound VAC changes.  2.  Repeat irrigation and debridement if no continued improvement.    INDICATIONS:  The patient is a gentleman who is here for a staged irrigation   and debridement of his left leg.  The above procedure was discussed and all   questions were answered.  Risks of surgery were explained, which include but   not limited to wound problems, infection, nerve injury, vascular injury, and   need for further surgery.  He understands he could have persistent risk of his   pain, malunion, persistence of his infection, need for further surgery.  He   understands and accepts these risks and agreed to proceed.  His site was   marked by myself prior to receiving psychotropic medicines.    PROCEDURE IN DETAIL:  The patient was brought to the operating room and   underwent general endotracheal anesthesia without complications.  Left lower   extremity was prepped and draped in standard fashion in supine position with   all appropriate padding.  Positive site verification confirmed his left lower   extremity as well as above procedure and confirmation that he received   preoperative antibiotics.  Esmarch was used to exsanguinate his foot and ankle   and leg and tourniquet was inflated to 250 mmHg.  Using 3 liters of normal   saline, ____ irrigated out throughout all compartments of his leg.  The muscle   belly deep still appeared to be viable.  Skin, particularly at the margins,   had become necrotic and these was sharply excised with a 15 blade down to the    level of fascia.  Once this was completed, a wound VAC was placed with   positive seal.  He was transferred to the recovery room in good condition.       ____________________________________     MD SARAH ROSAS / DERRICK    DD:  04/24/2020 12:23:14  DT:  04/24/2020 12:51:00    D#:  [id number]  Job#:  [id number]

## 2020-04-24 NOTE — OP REPORT
DATE OF SERVICE:  04/22/2020    PREOPERATIVE DIAGNOSES:  1.  Left leg abscess.  2.  Left leg cellulitis.    POSTOPERATIVE DIAGNOSES:  1.  Left leg abscess.  2.  Left leg cellulitis.    PROCEDURE PERFORMED:  1.  Left leg irrigation and debridement, multiple compartments, deep down to   the level of muscle.  2.  Left fasciotomy anterior and posterior compartments.  3.  Left placement of wound VAC.    SURGEON:  Barrie Israel MD    FIRST ASSISTANT:  KEV Alvarez    ANESTHESIA:  General endotracheal.    ESTIMATED BLOOD LOSS:  None.    COMPLICATIONS:  None.    SPECIMENS:  Deep cultures.    POSTOPERATIVE PLAN:  Return to operating room.    INDICATIONS:  Please see my consult note.    PROCEDURE IN DETAIL:  The patient was brought to the operating room and   underwent general endotracheal anesthesia without complications.  His left   lower extremity was prepped and draped in sterile fashion in supine position   with all appropriate padding.  Positive site verification confirmed his left   lower extremity, above procedure, and confirmation that he received   preoperative antibiotics.  Esmarch was used to exsanguinate his foot and ankle   and leg and tourniquet was inflated to 250 mmHg.  A 15 blade was used to make   a ____ incision from distal leg proximally encompassing his 2 draining wound   sites.  I went all the way down to the level of the fascia.  There was gross   purulence at this level.  This was sharply excised with a combination of   curette and 15 blade.  In addition, an incision was then made through the   fascia and a fasciotomy was performed of the anterior and posterior   compartments, also excised using all the fascia that was exposed underneath   the area.  Irrigation was performed.  All tissue appeared to be viable at   completion.  Wound VAC was placed with positive seal.  He was transferred to   recovery room in good condition.    Utilization of Eufemia Berman was necessary for patient  positioning, holding,   retracting, wound closure, and dressing placement.  She was present throughout   the entire procedure.       ____________________________________     MD SARAH ROSAS / DERRICK    DD:  04/24/2020 12:31:37  DT:  04/24/2020 13:32:48    D#:  [id number]  Job#:  [id number]

## 2020-04-24 NOTE — ANESTHESIA PROCEDURE NOTES
Airway  Performed by: Fernando Grover III, M.D.  Authorized by: Fernando Grover III, M.D.     Location:  OR  Urgency:  Elective  Difficult Airway: No    Indications for Airway Management:  Anesthesia      Spontaneous Ventilation: absent    Sedation Level:  Deep  Preoxygenated: Yes    Final Airway Type:  Supraglottic airway  Final Supraglottic Airway:  Standard LMA  SGA Size:  4  Number of Attempts at Approach:  1

## 2020-04-24 NOTE — PROGRESS NOTES
Infectious diseases Follow-up Consult Note    Date of note:    4/20/2020      Consulting Physician: ALEJANDRO Krishnamurthy M.D.      Chief Complaint   Patient presents with   • Wound Check     Pt reports to have left leg wound, being seen at wound care, sent by MD for further care. pt denies fever, but states wounds on leg are draining.        Interim Events:  Afebrile, VSS  Leukocytosis resolved  Cultures showed Pseudomonas, antibiotics changed from Unasyn to Zosyn 4/23    S/p I&D 4/22  Plan for repeat I&D today    ROS  Constitutional: Negative for fevers or chills.   Respiratory: Negative for cough and shortness of breath.    Cardiovascular: Negative for chest pain or palpitations.  Gastrointestinal: Negative for abdominal pain, diarrhea, nausea and vomiting.   Genitourinary: Negative for dysuria.   Musculoskeletal: Positive for LLE pain and swelling   Neurological: Negative for dizziness and headaches.   All other systems reviewed and are negative.    Past medical, surgical, social, and family history reviewed and unchanged from admission except as noted in assessment and plan.    Medications: Reviewed in MAR  Current Facility-Administered Medications   Medication Dose Frequency Provider Last Rate Last Dose   • [START ON 4/25/2020] heparin injection 5,000 Units  5,000 Units Q8HRS Mulugeta CHARLETTE Olde, A.P.N.       • NS infusion   Continuous Mulugeta M Olde, A.P.N.   Stopped at 04/24/20 1050   • senna-docusate (PERICOLACE or SENOKOT S) 8.6-50 MG per tablet 1 Tab  1 Tab BID Mulugeta M Olde, A.P.N.   1 Tab at 04/24/20 0459   • polyethylene glycol/lytes (MIRALAX) PACKET 1 Packet  1 Packet DAILY Mulugeta M Olde, A.P.N.   Stopped at 04/24/20 0600   • magnesium hydroxide (MILK OF MAGNESIA) suspension 30 mL  30 mL QDAY PRN Mulugeta M Olde, A.P.N.       • piperacillin-tazobactam (ZOSYN) 4.5 g in  mL IVPB  4.5 g Q8HRS Domitila Florez M.D. 25 mL/hr at 04/24/20 1411 4.5 g at 04/24/20 1411   • linezolid (ZYVOX) tablet 600 mg  600 mg  Q12HRS H Santos Krishnamurthy M.D.   600 mg at 04/24/20 0500   • amitriptyline (ELAVIL) tablet 25 mg  25 mg HS PRN Silverio Lopez M.D.       • atorvastatin (LIPITOR) tablet 40 mg  40 mg DAILY Silverio Lopez M.D.   40 mg at 04/24/20 0500   • ferrous gluconate (FERGON) tablet 324 mg  324 mg QDAY with Breakfast Silverio Lopez M.D.   Stopped at 04/24/20 0730   • sertraline (ZOLOFT) tablet 100 mg  100 mg DAILY Silverio Lopez M.D.   100 mg at 04/24/20 0500   • acetaminophen (TYLENOL) tablet 650 mg  650 mg Q6HRS PRLEON Lopez M.D.   650 mg at 04/17/20 1736   • ondansetron (ZOFRAN) syringe/vial injection 4 mg  4 mg Q4HRS MICHELLE Lopez M.D.       • ondansetron (ZOFRAN ODT) dispertab 4 mg  4 mg Q4HRS PRLEON Lopez M.D.       • promethazine (PHENERGAN) tablet 12.5-25 mg  12.5-25 mg Q4HRS MICHELLE Lopez M.D.       • promethazine (PHENERGAN) suppository 12.5-25 mg  12.5-25 mg Q4HRS MICHELLE Lopez M.D.       • prochlorperazine (COMPAZINE) injection 5-10 mg  5-10 mg Q4HRS MICHELLE Lopez M.D.       • hydrALAZINE (APRESOLINE) injection 10 mg  10 mg Q6HRS MICHELLE Lopez M.D.       • oxyCODONE immediate-release (ROXICODONE) tablet 5-10 mg  5-10 mg Q4HRS PRLEON Lopez M.D.   10 mg at 04/24/20 0912   • morphine (pf) 4 MG/ML injection 2-4 mg  2-4 mg Q4HRS PRN Silverio Beenag, M.D.   4 mg at 04/22/20 1423   Last reviewed on 4/22/2020 11:25 AM by Melody Ceron R.N.    Allergies   Allergen Reactions   • Norco [Hydrocodone-Acetaminophen] Itching       Physical Exam  Body mass index is 25 kg/m². /83   Pulse 65   Temp 36.7 °C (98.1 °F) (Temporal)   Resp 18   Ht 1.829 m (6')   Wt 83.6 kg (184 lb 4.9 oz)   SpO2 99%    Vitals:    04/24/20 1300 04/24/20 1330 04/24/20 1430 04/24/20 1530   BP: 158/93 153/85 136/92 133/83   Pulse: 64 61 64 65   Resp: 15 16 18 18   Temp:  36.7 °C (98 °F) 36.4 °C (97.5 °F) 36.7 °C (98.1 °F)   TempSrc:  Temporal Temporal Temporal   SpO2:  97% 100% 99% 99%   Weight:       Height:        Oxygen Therapy:  Pulse Oximetry: 99 %, O2 (LPM): 6, O2 Delivery Device: Simple Mask    General: no apparent distress  HEENT: NA, AT, conjunctiva normal   Lungs: normal respiratory effort, CTAB  Heart: RRR, no murmurs  EXT:Chronic venous stasis changes noted bilaterally. S/p I&D- LLE bandaged and wound vac in place.  Abdomen: soft, non tender, non distended  Neurological: A/O x 3. No focal sensory deficits      Labs (personally reviewed and notable for):   Recent Results (from the past 24 hour(s))   Sed Rate    Collection Time: 04/24/20  2:22 AM   Result Value Ref Range    Sed Rate Westergren >120 (H) 0 - 20 mm/hour   Comp Metabolic Panel    Collection Time: 04/24/20  2:22 AM   Result Value Ref Range    Sodium 139 135 - 145 mmol/L    Potassium 5.1 3.6 - 5.5 mmol/L    Chloride 106 96 - 112 mmol/L    Co2 23 20 - 33 mmol/L    Anion Gap 10.0 7.0 - 16.0    Glucose 94 65 - 99 mg/dL    Bun 24 (H) 8 - 22 mg/dL    Creatinine 1.22 0.50 - 1.40 mg/dL    Calcium 7.7 (L) 8.5 - 10.5 mg/dL    AST(SGOT) 17 12 - 45 U/L    ALT(SGPT) 12 2 - 50 U/L    Alkaline Phosphatase 99 30 - 99 U/L    Total Bilirubin 0.4 0.1 - 1.5 mg/dL    Albumin 2.3 (L) 3.2 - 4.9 g/dL    Total Protein 6.8 6.0 - 8.2 g/dL    Globulin 4.5 (H) 1.9 - 3.5 g/dL    A-G Ratio 0.5 g/dL   CBC WITH DIFFERENTIAL    Collection Time: 04/24/20  2:22 AM   Result Value Ref Range    WBC 9.5 4.8 - 10.8 K/uL    RBC 3.26 (L) 4.70 - 6.10 M/uL    Hemoglobin 10.0 (L) 14.0 - 18.0 g/dL    Hematocrit 31.2 (L) 42.0 - 52.0 %    MCV 95.7 81.4 - 97.8 fL    MCH 30.7 27.0 - 33.0 pg    MCHC 32.1 (L) 33.7 - 35.3 g/dL    RDW 56.0 (H) 35.9 - 50.0 fL    Platelet Count 347 164 - 446 K/uL    MPV 8.5 (L) 9.0 - 12.9 fL    Neutrophils-Polys 81.00 (H) 44.00 - 72.00 %    Lymphocytes 9.60 (L) 22.00 - 41.00 %    Monocytes 6.90 0.00 - 13.40 %    Eosinophils 1.40 0.00 - 6.90 %    Basophils 0.20 0.00 - 1.80 %    Immature Granulocytes 0.90 0.00 - 0.90 %    Nucleated  RBC 0.00 /100 WBC    Neutrophils (Absolute) 7.72 (H) 1.82 - 7.42 K/uL    Lymphs (Absolute) 0.92 (L) 1.00 - 4.80 K/uL    Monos (Absolute) 0.66 0.00 - 0.85 K/uL    Eos (Absolute) 0.13 0.00 - 0.51 K/uL    Baso (Absolute) 0.02 0.00 - 0.12 K/uL    Immature Granulocytes (abs) 0.09 0.00 - 0.11 K/uL    NRBC (Absolute) 0.00 K/uL   MAGNESIUM    Collection Time: 04/24/20  2:22 AM   Result Value Ref Range    Magnesium 2.0 1.5 - 2.5 mg/dL   PHOSPHORUS    Collection Time: 04/24/20  2:22 AM   Result Value Ref Range    Phosphorus 3.4 2.5 - 4.5 mg/dL   ESTIMATED GFR    Collection Time: 04/24/20  2:22 AM   Result Value Ref Range    GFR If African American >60 >60 mL/min/1.73 m 2    GFR If Non African American 60 >60 mL/min/1.73 m 2           ASSESSMENT & PLAN    #LLE cellulitis- CT left lower extremity 4/21 showed no OM or drainable abscess   #Chronic venous stasis changes in bilateral lower extremities  #Polysubstance abuse with methamphetamines and cannabinoids  #Hepatitis C status post treatment    -s/p I&D 4/22, plan for repeat I&D today  -Afebrile, leukocuytosis resolved  -Blood culture showed no growth to date  -Wound cultures positive for group A strep and MRSA  -Intraoperative wound cx showed pseudomonas and GAS  -Continue linezolid and zosyn  -Monitor CBC while on linezolid  -Continue wound care and recommend follow-up with outpatient wound clinic    Case discussed with Dr. Florez. We will continue to follow.

## 2020-04-24 NOTE — CARE PLAN
Problem: Safety  Goal: Will remain free from falls  Intervention: Implement fall precautions  Flowsheets  Taken 4/24/2020 1120 by Erin Dutta R.N.  Bed Alarm: Yes - Alarm On  Taken 4/22/2020 0944 by Beth Thorne RERIN  Environmental Precautions:   Personal Belongings, Wastebasket, Call Bell etc. in Easy Reach   Transferred to Stronger Side   Treaded Slipper Socks on Patient   Bed in Low Position   Mobility Assessed & Appropriate Sign Placed

## 2020-04-24 NOTE — ANESTHESIA TIME REPORT
Anesthesia Start and Stop Event Times     Date Time Event    4/24/2020 1120 Ready for Procedure     1133 Anesthesia Start     1224 Anesthesia Stop        Responsible Staff  04/24/20    Name Role Begin End    Fernando Grover III, M.D. Anesth 1133 1224        Preop Diagnosis (Free Text):  Pre-op Diagnosis     Cellulitis of left lower extremity        Preop Diagnosis (Codes):    Post op Diagnosis  Cellulitis of left lower extremity      Premium Reason  Non-Premium    Comments:

## 2020-04-24 NOTE — PROGRESS NOTES
4/24/2020    Goran Mcleodbindu    .S: No new complaints    O:  /83   Pulse 64   Temp 37.1 °C (98.8 °F) (Temporal)   Resp 18   Ht 1.829 m (6')   Wt 83.6 kg (184 lb 4.9 oz)   SpO2 96%   Recent Labs     04/22/20  0049 04/23/20  0033 04/24/20  0222   WBC 7.2 11.3* 9.5   RBC 3.26* 3.40* 3.26*   HEMOGLOBIN 10.0* 10.3* 10.0*   HEMATOCRIT 30.7* 32.6* 31.2*   MCV 94.2 95.9 95.7   MCH 30.7 30.3 30.7   MCHC 32.6* 31.6* 32.1*   RDW 53.8* 53.4* 56.0*   PLATELETCT 312 343 347   MPV 8.8* 8.7* 8.5*         Recent Labs     04/22/20  0049 04/23/20  0033 04/24/20 0222   SODIUM 135 137 139   POTASSIUM 5.0 4.8 5.1   CHLORIDE 103 104 106   CO2 22 21 23   GLUCOSE 103* 117* 94   BUN 20 22 24*       Intake/Output Summary (Last 24 hours) at 4/24/2020 1109  Last data filed at 4/24/2020 1005  Gross per 24 hour   Intake 1916 ml   Output 1375 ml   Net 541 ml     I have reviewed the relevant vascular and radiology studies.  Wound vac in place    A:   Active Hospital Problems    Diagnosis   • Venous stasis ulcer of left lower extremity ([facility]) [I83.029, L97.929]     Priority: High     S/p venous ablation through vascular with improvement in wound healing, using lymphedema pump and compression     • Cellulitis of left lower extremity [L03.116]     Priority: High   • Venous stasis dermatitis of both lower extremities [I87.2]     Priority: Medium   • CKD (chronic kidney disease) stage 3, GFR 30-59 ml/min ([facility]) [N18.3]     Priority: Medium   • Essential hypertension [I10]     Priority: Medium   • Normocytic anemia [D64.9]   • Methamphetamine abuse ([facility]) [F15.10]   • Alcoholism ([facility]) [F10.20]         P: Repeat irrigaiton and debidement, r/b discussed

## 2020-04-24 NOTE — ANESTHESIA PROCEDURE NOTES
Peripheral IV  Date/Time: 4/24/2020 11:00 AM  Performed by: Fernando Grover III, M.D.  Authorized by: Fernando Grover III, M.D.     Size:  18 G (long)  Laterality:  Left (distal forearm)  Local Anesthetic:  None  Site Prep:  Alcohol  Technique:  Direct puncture  Attempts:  1   Done under GA

## 2020-04-24 NOTE — PROGRESS NOTES
Assumed care at 1900, report received from Day RN.  Pt A&O x 4, states pain is 8/10 on LLE. PRN pain medications provided per MAR. Bed alarm on. Pt on RA. Wound vac on LLE. Bed locked, 2 rails up, bed in lowest position. Call light in place, belongings at bedside, no needs at this time and hourly rounding in place.

## 2020-04-24 NOTE — OR NURSING
Pt recovering well at this time. Had some pain being managed with medication. Pt denies nausea, tolerating sips of water. A&Ox4, VSS. Pt does not request any family be called. Wound vac on left foot with dressing c/d/i. Pt responding appropriately to questions and following commands. No belongings in PACU.    Pt to be transported back to room on 6L O2 via simple mask. Tank on bed is 1/2 full.

## 2020-04-24 NOTE — WOUND TEAM
Wound consult received for NPWT vac changes to begin Monday. Wound team will see patient Monday for vac change and will continue to follow with LPS.

## 2020-04-24 NOTE — CONSULTS
DATE OF SERVICE:  04/22/2020    CHIEF COMPLAINT:  Left leg cellulitis and drainage.    CONSULTING SERVICE:  Limb preservation service.    INDICATIONS:  The patient is a 64-year-old male.  He presented with left leg   redness and swelling on 04/16/2020.  He was placed on IV antibiotics.  He does   have venous stasis dermatitis in both legs as well as some ulcers.  He was on   antibiotics.  Unfortunately, this was not making any progress.  He then   developed draining purulence out of his leg.  His condition was certainly not   getting any better.  He has pain in his leg.  He does have general malaise.    He feels like his problems are getting worse.    PAST MEDICAL HISTORY, MEDICINES, ALLERGIES, FAMILY HISTORY, SOCIAL HISTORY,   AND REVIEW OF SYSTEMS:  Reviewed on the H and P by  ____ dated 04/16/2020.    PHYSICAL EXAMINATION:  VITAL SIGNS:  Afebrile, vital signs stable.  GENERAL:  Well-appearing male, in no acute distress.  PSYCHIATRIC:  Pleasant demeanor, normal affect.  EYES:  Pupils equal, round.  RESPIRATIONS:  Regular rate, unlabored breathing.  CARDIOVASCULAR:  He has palpable dorsalis pedis pulse with brisk capillary   refill.  NEUROLOGIC:  He has diminished sensation in his foot.  Muscle strength is   guarded due to pain.  SKIN:  Shows severe cellulitis and venous stasis ulcers along his leg.  He has   2 sites with some purulent drainage coming from lateral aspect of his leg.  MUSCULOSKELETAL:  He has Charcot foot because of his knee flexion contracture.    He has limited range of motion.  He has no gross instability.  He is   nonambulatory.    LABORATORY DATA:  Labs are reviewed demonstrate elevated sed rate.  Glucose   94.    DIAGNOSTIC DATA:  CT scan demonstrates no obvious abscess in his leg that is   deep.    IMPRESSION:  Left leg chronic cellulitis with venous stasis ulcers and likely   deep abscess.    PLAN:  I had a long discussion with the patient about his options including   operative and  nonoperative.  He elected to proceed with operative   intervention.  Recommend a left leg irrigation and debridement.  He   understands that this might be multiple procedures.  We discussed risks and   benefits.  All questions were answered.  We will schedule this next available.       ____________________________________     MD SARAH ROSAS / DERRICK    DD:  04/24/2020 12:35:01  DT:  04/24/2020 13:38:00    D#:  [id number]  Job#:  [id number]

## 2020-04-24 NOTE — PROGRESS NOTES
More from Lab called with critical result of wound cultures having pseudomonas and group A strep in the wound at 1105. Critical lab result read back to More.   Dr. Israel notified of critical lab result at 1110.  Critical lab result read back by Dr. Israel.

## 2020-04-24 NOTE — ANESTHESIA POSTPROCEDURE EVALUATION
Patient: Goran Chavez    Procedure Summary     Date:  04/24/20 Room / Location:  [facility] 12 / SURGERY [facility]    Anesthesia Start:  1133 Anesthesia Stop:  1224    Procedure:  IRRIGATION AND DEBRIDEMENT, WOUND-FOOT (Left Leg Lower) Diagnosis:  (Cellulitis of left lower extremity)    Surgeon:  Barrie Israel M.D. Responsible Provider:  Fernando Grover III, M.D.    Anesthesia Type:  general ASA Status:  3 - Emergent          Final Anesthesia Type: general  Last vitals  BP   Blood Pressure: 158/93    Temp   36.3 °C (97.4 °F)    Pulse   Pulse: 64   Resp   15    SpO2   97 %      Anesthesia Post Evaluation    Patient location during evaluation: PACU  Patient participation: complete - patient participated  Level of consciousness: awake and alert  Pain score: 5    Airway patency: patent  Anesthetic complications: no  Cardiovascular status: hemodynamically stable  Respiratory status: acceptable  Hydration status: euvolemic    PONV: none           Nurse Pain Score: 5 (NPRS)

## 2020-04-24 NOTE — ANESTHESIA QCDR
2019 [facility] Clinical Data Registry (for Quality Improvement)     Postoperative nausea/vomiting risk protocol (Adult = 18 yrs and Pediatric 3-17 yrs)- (430 and 463)  General inhalation anesthetic (NOT TIVA) with PONV risk factors: Yes  Provision of anti-emetic therapy with at least 2 different classes of agents: Yes   Patient DID NOT receive anti-emetic therapy and reason is documented in Medical Record:  N/A    Multimodal Pain Management- (477)  Non-emergent surgery AND patient age >= 18: No  Use of Multimodal Pain Management, two or more drugs and/or interventions, NOT including systemic opioids:   Exception: Documented allergy to multiple classes of analgesics:     Smoking Abstinence (404)  Patient is current smoker (cigarette, pipe, e-cig, marijuanna): No  Elective Surgery:   Abstinence instructions provided prior to day of surgery:   Patient abstained from smoking on day of surgery:     Pre-Op Beta-Blocker in Isolated CABG (44)  Isolated CABG AND patient age >= 18: No  Beta-blocker admin within 24 hours of surgical incision:   Exception:of medical reason(s) for not administering beta blocker within 24 hours prior to surgical incision (e.g., not  indicated,other medical reason):     PACU assessment of acute postoperative pain prior to Anesthesia Care End- Applies to Patients Age = 18- (ABG7)  Initial PACU pain score is which of the following: < 7/10  Patient unable to report pain score: N/A    Post-anesthetic transfer of care checklist/protocol to PACU/ICU- (426 and 427)  Upon conclusion of case, patient transferred to which of the following locations: PACU/Non-ICU  Use of transfer checklist/protocol: Yes  Exclusion: Service Performed in Patient Hospital Room (and thus did not require transfer): N/A  Unplanned admission to ICU related to anesthesia service up through end of PACU care- (MD51)  Unplanned admission to ICU (not initially anticipated at anesthesia start time): No

## 2020-04-25 PROBLEM — K59.00 CONSTIPATION: Status: ACTIVE | Noted: 2020-04-25

## 2020-04-25 LAB
ALBUMIN SERPL BCP-MCNC: 2.2 G/DL (ref 3.2–4.9)
ALBUMIN/GLOB SERPL: 0.5 G/DL
ALP SERPL-CCNC: 92 U/L (ref 30–99)
ALT SERPL-CCNC: 12 U/L (ref 2–50)
ANION GAP SERPL CALC-SCNC: 11 MMOL/L (ref 7–16)
AST SERPL-CCNC: 21 U/L (ref 12–45)
BACTERIA SPEC ANAEROBE CULT: NORMAL
BASOPHILS # BLD AUTO: 0.3 % (ref 0–1.8)
BASOPHILS # BLD: 0.03 K/UL (ref 0–0.12)
BILIRUB SERPL-MCNC: 0.3 MG/DL (ref 0.1–1.5)
BUN SERPL-MCNC: 26 MG/DL (ref 8–22)
CALCIUM SERPL-MCNC: 7.6 MG/DL (ref 8.5–10.5)
CHLORIDE SERPL-SCNC: 103 MMOL/L (ref 96–112)
CO2 SERPL-SCNC: 22 MMOL/L (ref 20–33)
CREAT SERPL-MCNC: 1.37 MG/DL (ref 0.5–1.4)
EOSINOPHIL # BLD AUTO: 0.02 K/UL (ref 0–0.51)
EOSINOPHIL NFR BLD: 0.2 % (ref 0–6.9)
ERYTHROCYTE [DISTWIDTH] IN BLOOD BY AUTOMATED COUNT: 56.4 FL (ref 35.9–50)
ERYTHROCYTE [SEDIMENTATION RATE] IN BLOOD BY WESTERGREN METHOD: 116 MM/HOUR (ref 0–20)
GLOBULIN SER CALC-MCNC: 4.5 G/DL (ref 1.9–3.5)
GLUCOSE SERPL-MCNC: 109 MG/DL (ref 65–99)
HCT VFR BLD AUTO: 30.5 % (ref 42–52)
HGB BLD-MCNC: 9.5 G/DL (ref 14–18)
IMM GRANULOCYTES # BLD AUTO: 0.05 K/UL (ref 0–0.11)
IMM GRANULOCYTES NFR BLD AUTO: 0.5 % (ref 0–0.9)
LYMPHOCYTES # BLD AUTO: 0.69 K/UL (ref 1–4.8)
LYMPHOCYTES NFR BLD: 6.8 % (ref 22–41)
MAGNESIUM SERPL-MCNC: 2.2 MG/DL (ref 1.5–2.5)
MCH RBC QN AUTO: 30.7 PG (ref 27–33)
MCHC RBC AUTO-ENTMCNC: 31.1 G/DL (ref 33.7–35.3)
MCV RBC AUTO: 98.7 FL (ref 81.4–97.8)
MONOCYTES # BLD AUTO: 0.53 K/UL (ref 0–0.85)
MONOCYTES NFR BLD AUTO: 5.2 % (ref 0–13.4)
NEUTROPHILS # BLD AUTO: 8.89 K/UL (ref 1.82–7.42)
NEUTROPHILS NFR BLD: 87 % (ref 44–72)
NRBC # BLD AUTO: 0 K/UL
NRBC BLD-RTO: 0 /100 WBC
PHOSPHATE SERPL-MCNC: 4 MG/DL (ref 2.5–4.5)
PLATELET # BLD AUTO: 339 K/UL (ref 164–446)
PMV BLD AUTO: 8.4 FL (ref 9–12.9)
POTASSIUM SERPL-SCNC: 5 MMOL/L (ref 3.6–5.5)
PROT SERPL-MCNC: 6.7 G/DL (ref 6–8.2)
RBC # BLD AUTO: 3.09 M/UL (ref 4.7–6.1)
SIGNIFICANT IND 70042: NORMAL
SITE SITE: NORMAL
SODIUM SERPL-SCNC: 136 MMOL/L (ref 135–145)
SOURCE SOURCE: NORMAL
VIT B1 BLD-MCNC: 82 NMOL/L (ref 70–180)
WBC # BLD AUTO: 10.2 K/UL (ref 4.8–10.8)

## 2020-04-25 PROCEDURE — 770001 HCHG ROOM/CARE - MED/SURG/GYN PRIV*

## 2020-04-25 PROCEDURE — 700105 HCHG RX REV CODE 258: Performed by: NURSE PRACTITIONER

## 2020-04-25 PROCEDURE — 700102 HCHG RX REV CODE 250 W/ 637 OVERRIDE(OP): Performed by: FAMILY MEDICINE

## 2020-04-25 PROCEDURE — 700111 HCHG RX REV CODE 636 W/ 250 OVERRIDE (IP): Performed by: INTERNAL MEDICINE

## 2020-04-25 PROCEDURE — 99232 SBSQ HOSP IP/OBS MODERATE 35: CPT | Performed by: INTERNAL MEDICINE

## 2020-04-25 PROCEDURE — 700102 HCHG RX REV CODE 250 W/ 637 OVERRIDE(OP): Performed by: NURSE PRACTITIONER

## 2020-04-25 PROCEDURE — A9270 NON-COVERED ITEM OR SERVICE: HCPCS | Performed by: FAMILY MEDICINE

## 2020-04-25 PROCEDURE — 84100 ASSAY OF PHOSPHORUS: CPT

## 2020-04-25 PROCEDURE — 85025 COMPLETE CBC W/AUTO DIFF WBC: CPT

## 2020-04-25 PROCEDURE — 83735 ASSAY OF MAGNESIUM: CPT

## 2020-04-25 PROCEDURE — 99233 SBSQ HOSP IP/OBS HIGH 50: CPT | Performed by: INTERNAL MEDICINE

## 2020-04-25 PROCEDURE — 85652 RBC SED RATE AUTOMATED: CPT

## 2020-04-25 PROCEDURE — 80053 COMPREHEN METABOLIC PANEL: CPT

## 2020-04-25 PROCEDURE — 36415 COLL VENOUS BLD VENIPUNCTURE: CPT

## 2020-04-25 PROCEDURE — A9270 NON-COVERED ITEM OR SERVICE: HCPCS | Performed by: NURSE PRACTITIONER

## 2020-04-25 PROCEDURE — 700105 HCHG RX REV CODE 258: Performed by: INTERNAL MEDICINE

## 2020-04-25 PROCEDURE — 700111 HCHG RX REV CODE 636 W/ 250 OVERRIDE (IP): Performed by: NURSE PRACTITIONER

## 2020-04-25 PROCEDURE — 97162 PT EVAL MOD COMPLEX 30 MIN: CPT

## 2020-04-25 RX ORDER — LINEZOLID 600 MG/1
600 TABLET, FILM COATED ORAL EVERY 12 HOURS
Status: COMPLETED | OUTPATIENT
Start: 2020-04-25 | End: 2020-04-28

## 2020-04-25 RX ADMIN — HEPARIN SODIUM 5000 UNITS: 5000 INJECTION, SOLUTION INTRAVENOUS; SUBCUTANEOUS at 05:11

## 2020-04-25 RX ADMIN — SENNOSIDES AND DOCUSATE SODIUM 1 TABLET: 8.6; 5 TABLET ORAL at 05:10

## 2020-04-25 RX ADMIN — HEPARIN SODIUM 5000 UNITS: 5000 INJECTION, SOLUTION INTRAVENOUS; SUBCUTANEOUS at 21:18

## 2020-04-25 RX ADMIN — SODIUM CHLORIDE: 9 INJECTION, SOLUTION INTRAVENOUS at 13:18

## 2020-04-25 RX ADMIN — OXYCODONE 10 MG: 5 TABLET ORAL at 16:18

## 2020-04-25 RX ADMIN — PIPERACILLIN AND TAZOBACTAM 4.5 G: 4; .5 INJECTION, POWDER, LYOPHILIZED, FOR SOLUTION INTRAVENOUS; PARENTERAL at 21:19

## 2020-04-25 RX ADMIN — LINEZOLID 600 MG: 600 TABLET, FILM COATED ORAL at 05:11

## 2020-04-25 RX ADMIN — HEPARIN SODIUM 5000 UNITS: 5000 INJECTION, SOLUTION INTRAVENOUS; SUBCUTANEOUS at 13:40

## 2020-04-25 RX ADMIN — FERROUS GLUCONATE 324 MG: 324 TABLET ORAL at 08:41

## 2020-04-25 RX ADMIN — SENNOSIDES AND DOCUSATE SODIUM 1 TABLET: 8.6; 5 TABLET ORAL at 18:24

## 2020-04-25 RX ADMIN — PIPERACILLIN AND TAZOBACTAM 4.5 G: 4; .5 INJECTION, POWDER, LYOPHILIZED, FOR SOLUTION INTRAVENOUS; PARENTERAL at 13:17

## 2020-04-25 RX ADMIN — LINEZOLID 600 MG: 600 TABLET, FILM COATED ORAL at 18:24

## 2020-04-25 RX ADMIN — OXYCODONE 10 MG: 5 TABLET ORAL at 00:13

## 2020-04-25 RX ADMIN — OXYCODONE 10 MG: 5 TABLET ORAL at 05:11

## 2020-04-25 RX ADMIN — POLYETHYLENE GLYCOL 3350 1 PACKET: 17 POWDER, FOR SOLUTION ORAL at 05:10

## 2020-04-25 RX ADMIN — ATORVASTATIN CALCIUM 40 MG: 40 TABLET, FILM COATED ORAL at 05:11

## 2020-04-25 RX ADMIN — PIPERACILLIN AND TAZOBACTAM 4.5 G: 4; .5 INJECTION, POWDER, LYOPHILIZED, FOR SOLUTION INTRAVENOUS; PARENTERAL at 05:11

## 2020-04-25 RX ADMIN — SERTRALINE HYDROCHLORIDE 100 MG: 100 TABLET ORAL at 05:10

## 2020-04-25 RX ADMIN — OXYCODONE 10 MG: 5 TABLET ORAL at 10:07

## 2020-04-25 RX ADMIN — OXYCODONE 10 MG: 5 TABLET ORAL at 21:18

## 2020-04-25 RX ADMIN — MAGNESIUM CITRATE 296 ML: 1.75 LIQUID ORAL at 13:37

## 2020-04-25 ASSESSMENT — COGNITIVE AND FUNCTIONAL STATUS - GENERAL
MOBILITY SCORE: 23
DAILY ACTIVITIY SCORE: 23
SUGGESTED CMS G CODE MODIFIER MOBILITY: CJ
MOVING FROM LYING ON BACK TO SITTING ON SIDE OF FLAT BED: A LITTLE
CLIMB 3 TO 5 STEPS WITH RAILING: A LITTLE
SUGGESTED CMS G CODE MODIFIER MOBILITY: CI
DRESSING REGULAR LOWER BODY CLOTHING: A LITTLE
WALKING IN HOSPITAL ROOM: A LITTLE
MOBILITY SCORE: 22
SUGGESTED CMS G CODE MODIFIER DAILY ACTIVITY: CI

## 2020-04-25 ASSESSMENT — ENCOUNTER SYMPTOMS
MYALGIAS: 1
ORTHOPNEA: 0
ABDOMINAL PAIN: 0
TINGLING: 0
BLOOD IN STOOL: 0
NECK PAIN: 0
FEVER: 0
DIARRHEA: 0
EYE PAIN: 0
CONSTIPATION: 1
SHORTNESS OF BREATH: 0
BACK PAIN: 0
FOCAL WEAKNESS: 0
MYALGIAS: 0
HEADACHES: 0
SPUTUM PRODUCTION: 0
PALPITATIONS: 0
NAUSEA: 0
HEMOPTYSIS: 0
DIZZINESS: 0
PHOTOPHOBIA: 0
DOUBLE VISION: 0
CHILLS: 0
WEIGHT LOSS: 0
VOMITING: 0

## 2020-04-25 ASSESSMENT — GAIT ASSESSMENTS
ASSISTIVE DEVICE: FRONT WHEEL WALKER
GAIT LEVEL OF ASSIST: SUPERVISED
DISTANCE (FEET): 50

## 2020-04-25 ASSESSMENT — FIBROSIS 4 INDEX: FIB4 SCORE: 1.14

## 2020-04-25 NOTE — PROGRESS NOTES
Infectious Disease Progress Note    Author: Ronak Naylor M.D. Date & Time of service: 2020  10:20 AM    Chief Complaint:  Follow-up for cellulitis    Interval History:   afebrile, white count 10.2, tolerating Zyvox and Zosyn.  Reports feeling better overall.    Labs Reviewed and Medications Reviewed.    Review of Systems:  Review of Systems   Constitutional: Negative for chills, fever and weight loss.   Respiratory: Negative for shortness of breath.    Gastrointestinal: Positive for constipation. Negative for abdominal pain, diarrhea, nausea and vomiting.   Genitourinary: Negative for dysuria.   Musculoskeletal: Positive for myalgias.   Skin: Negative for itching and rash.   Neurological: Negative for focal weakness.   All other systems reviewed and are negative.      Hemodynamics:  Temp (24hrs), Av.7 °C (98 °F), Min:36.2 °C (97.2 °F), Max:37.2 °C (99 °F)  Temperature: 36.7 °C (98.1 °F)  Pulse  Av.1  Min: 60  Max: 127   Blood Pressure: 128/83       Physical Exam:  Physical Exam  Vitals signs and nursing note reviewed.   Constitutional:       General: He is not in acute distress.     Appearance: He is not ill-appearing.   HENT:      Head: Normocephalic and atraumatic.      Right Ear: External ear normal.      Left Ear: External ear normal.      Mouth/Throat:      Mouth: Mucous membranes are moist.      Pharynx: No oropharyngeal exudate.      Comments: Poor dentition  Eyes:      General: No scleral icterus.     Conjunctiva/sclera: Conjunctivae normal.      Pupils: Pupils are equal, round, and reactive to light.   Neck:      Musculoskeletal: Neck supple.   Cardiovascular:      Rate and Rhythm: Normal rate and regular rhythm.      Pulses: Normal pulses.      Heart sounds: Normal heart sounds. No murmur.   Pulmonary:      Effort: Pulmonary effort is normal. No respiratory distress.      Breath sounds: No wheezing.   Abdominal:      General: There is no distension.      Palpations: Abdomen is soft.         Tenderness: There is no abdominal tenderness.   Musculoskeletal: Normal range of motion.         General: Swelling present.      Comments: Left lower extremity with dressing and drain in place   Lymphadenopathy:      Cervical: No cervical adenopathy.   Skin:     General: Skin is warm.      Findings: No erythema or rash.   Neurological:      General: No focal deficit present.      Mental Status: He is alert and oriented to person, place, and time.   Psychiatric:         Mood and Affect: Mood normal.         Behavior: Behavior normal.      Comments: Pleasant         Meds:    Current Facility-Administered Medications:   •  heparin  •  NS  •  senna-docusate  •  polyethylene glycol/lytes  •  magnesium hydroxide  •  [COMPLETED] piperacillin-tazobactam **AND** piperacillin-tazobactam  •  amitriptyline  •  atorvastatin  •  ferrous gluconate  •  sertraline  •  acetaminophen  •  ondansetron  •  ondansetron  •  promethazine  •  promethazine  •  prochlorperazine  •  hydrALAZINE  •  oxyCODONE immediate-release  •  morphine injection    Labs:  Recent Labs     04/23/20 0033 04/24/20 0222 04/25/20 0043   WBC 11.3* 9.5 10.2   RBC 3.40* 3.26* 3.09*   HEMOGLOBIN 10.3* 10.0* 9.5*   HEMATOCRIT 32.6* 31.2* 30.5*   MCV 95.9 95.7 98.7*   MCH 30.3 30.7 30.7   RDW 53.4* 56.0* 56.4*   PLATELETCT 343 347 339   MPV 8.7* 8.5* 8.4*   NEUTSPOLYS 88.50* 81.00* 87.00*   LYMPHOCYTES 3.70* 9.60* 6.80*   MONOCYTES 6.70 6.90 5.20   EOSINOPHILS 0.00 1.40 0.20   BASOPHILS 0.30 0.20 0.30     Recent Labs     04/23/20 0033 04/24/20 0222 04/25/20  0043   SODIUM 137 139 136   POTASSIUM 4.8 5.1 5.0   CHLORIDE 104 106 103   CO2 21 23 22   GLUCOSE 117* 94 109*   BUN 22 24* 26*     Recent Labs     04/23/20 0033 04/24/20 0222 04/25/20 0043   ALBUMIN 2.2* 2.3* 2.2*   TBILIRUBIN 0.4 0.4 0.3   ALKPHOSPHAT 105* 99 92   TOTPROTEIN 6.8 6.8 6.7   ALTSGPT 15 12 12   ASTSGOT 19 17 21   CREATININE 1.37 1.22 1.37       Imaging:  Ct-extremity, Lower With  Left    Result Date: 4/21/2020 4/21/2020 5:16 PM HISTORY/REASON FOR EXAM:  Lower leg pain, suspected osteomyelitis. TECHNIQUE/EXAM DESCRIPTION AND NUMBER OF VIEWS:  CT scan of the LEFT lower extremity with contrast, with reconstructions. Thin helical 3 mm sections were obtained from the distal femur through the proximal tibia/fibula. Sagittal and coronal multiplanar reconstructions were generated from the axial images. A total of 100 mL of Omnipaque 350 nonionic contrast was administered  IV without complication. Up to date radiation dose reduction adjustments have been utilized to meet ALARA standards for radiation dose reduction. COMPARISON: 4/16/2020. FINDINGS: No acute fracture or dislocation is identified. Cellulitis of the lower leg, with soft tissue prominence and subcutaneous air. No subjacent cortical bone destruction to suggest osteomyelitis. No drainable abscess. There is chronic appearing valgus deviation of the foot at the subtalar joint, and there is suspected pes planovalgus. Moderate subtalar osteoarthrosis. Multifocal mild to moderate osteoarthrosis in the midfoot. Mild multifocal interphalangeal osteoarthrosis. Reactive popliteal lymphadenopathy.     1. No evidence of osteomyelitis. 2. Cellulitis and soft tissue organs of the left lower leg. Subcutaneous air without drainable abscess. 3. Suspected pes planovalgus deformity. Multifocal osteoarthrosis of the midfoot.    Ct-extremity, Lower With Left    Result Date: 4/16/2020 4/16/2020 1:45 PM HISTORY/REASON FOR EXAM:  Lower leg swelling/redness, cellulitis suspected. Left lower extremity edema and redness TECHNIQUE/EXAM DESCRIPTION AND NUMBER OF VIEWS:  CT scan of the LEFT lower extremity with contrast, with reconstructions. Thin helical 3 mm sections were obtained from the distal femur through the proximal tibia/fibula. Sagittal and coronal multiplanar reconstructions were generated from the axial images. A total of 100 mL of Omnipaque 350  nonionic contrast was administered  IV without complication. Up to date radiation dose reduction adjustments have been utilized to meet ALARA standards for radiation dose reduction. COMPARISON: 12/26/2019 FINDINGS: There is diffuse subcutaneous edema in the lower leg. The overlying skin is markedly thickened. There is curvilinear fluid deep to the fascia, but superficial to the gastrocnemius muscle extending from the knee joint to the ankle. Edema extends into the deeper fascial planes. No soft tissue gas is identified. No definite muscle necrosis is identified. No thrombus is identified. The Achilles tendon is intact. The bones are osteopenic. No cortical destruction is identified.     1.  Extensive subcutaneous edema with overlying skin thickening. Inflammation extends along the fascial planes with curvilinear fluid superficial to the gastrocnemius muscle. No soft tissue gas to suggest necrotizing fasciitis. 2.  Osteopenia    Us-renal    Result Date: 4/19/2020 4/19/2020 10:53 AM HISTORY/REASON FOR EXAM:  Acute renal failure TECHNIQUE/EXAM DESCRIPTION: Renal ultrasound. COMPARISON:  08/15/2018 FINDINGS: The right kidney measures 8.11 cm. The left kidney measures 9.17 cm. There is no hydronephrosis. There are no abnormal calcifications. Small right pleural effusion is identified. The bladder demonstrates no focal wall abnormality.     1.  Right kidney appears smaller than left kidney which could be due to atrophy. 2.  No hydronephrosis. 3.  Incidentally noted small right pleural effusion.      Micro:  Results     Procedure Component Value Units Date/Time    Anaerobic Culture [[id number]] Collected:  04/22/20 1203    Order Status:  Completed Specimen:  Wound Updated:  04/24/20 1107     Significant Indicator NEG     Source WND     Site Left Leg     Culture Result Culture in progress.    Narrative:       CALL  Rivas  [facility] tel. [phone],  CALLED  [facility] tel. [phone] 04/24/2020, 11:06, RB PERF. RESULTS  CALLED  TO:[phone]  Surgery - swabs received    CULTURE WOUND W/ GRAM STAIN [[id number]]  (Abnormal)  (Susceptibility) Collected:  04/22/20 1203    Order Status:  Completed Specimen:  Wound Updated:  04/24/20 1107     Significant Indicator POS     Source WND     Site Left Leg     Culture Result -     Gram Stain Result Moderate WBCs.  No organisms seen.       Culture Result Pseudomonas aeruginosa  Light growth  P.aeruginosa may develop resistance during prolonged therapy  with all antibiotics. Isolates that are initially susceptible  may become resistant within three to four days after  initiation of therapy. Testing of repeat isolates may be  warranted.        Beta Hemolytic Streptococcus group A  Rare growth      Narrative:       CALL  Rivas  [facility] tel. [phone],  CALLED  [facility] tel. [phone] 04/24/2020, 11:06, RB PERF. RESULTS CALLED  TO:[phone]  Surgery - swabs received    Susceptibility     Pseudomonas aeruginosa (1)     Antibiotic Interpretation Microscan Method Status    Ceftazidime Sensitive <=1 mcg/mL SHANON Final    Ciprofloxacin Resistant >2 mcg/mL SHANON Final    Cefepime Sensitive 4 mcg/mL SHANON Final    Amikacin Sensitive <=16 mcg/mL SHANON Final    Gentamicin Sensitive <=4 mcg/mL SHANON Final    Tobramycin Sensitive <=4 mcg/mL SHANON Final    Meropenem Sensitive <=1 mcg/mL SHANON Final    Pip/Tazobactam Sensitive 64 mcg/mL SHANON Final                   GRAM STAIN [[id number]] Collected:  04/22/20 1203    Order Status:  Completed Specimen:  Wound Updated:  04/22/20 2059     Significant Indicator .     Source WND     Site Left Leg     Gram Stain Result Moderate WBCs.  No organisms seen.      Narrative:       Surgery - swabs received    BLOOD CULTURE [[id number]] Collected:  04/16/20 1246    Order Status:  Completed Specimen:  Blood from Peripheral Updated:  04/21/20 1500     Significant Indicator NEG     Source BLD     Site PERIPHERAL     Culture Result No growth after 5 days of incubation.    Narrative:       Per Hospital Policy:  "Only change Specimen Src: to \"Line\" if  specified by physician order.  Left Forearm/Arm    BLOOD CULTURE [[id number]] Collected:  04/16/20 1140    Order Status:  Completed Specimen:  Blood from Peripheral Updated:  04/21/20 1300     Significant Indicator NEG     Source BLD     Site PERIPHERAL     Culture Result No growth after 5 days of incubation.    Narrative:       Per Hospital Policy: Only change Specimen Src: to \"Line\" if  specified by physician order.  No site indicated          Assessment:  64-year-old male with methamphetamine abuse, hepatitis C status post treatment, bilateral lower extremity venous stasis dermatitis with ulcerations, admitted with left leg cellulitis and fever    Pertinent Diagnoses  Left lower extremity cellulitis  Chronic venous stasis changes bilateral lower extremities  Methamphetamine abuse  Hepatitis C status post treatment    Plan:  -CT of the left lower extremity on 4/21 with no osteomyelitis or drainable abscess  -Patient underwent I&D on 4/22 down to muscle, returned to the OR on 4/24 for repeat I&D of multiple compartments and placement of wound VAC skin at the margins was noted to be necrotic.  Potential repeat I&Ds  -Blood culture showed no growth to date  -Wound cultures positive for group A strep and MRSA  -Intraoperative wound cx showed pseudomonas and GAS  -Continue linezolid and IV zosyn  -Monitor CBC while on linezolid  -Continue wound care and recommend follow-up with outpatient wound clinic    Discussed with internal medicine, Dr. Ayon.  ID will follow.  Please call with questions.  "

## 2020-04-25 NOTE — PROGRESS NOTES
[id number]: Aox4. VSS on RA - 2L NC when sleeping. C/o left leg pain, given PRN PO 10mg oxycodone with good effect. Continent b/b, urinal at the bedside. Up to the bedside commode for BM. Mag citrate given d/t constipation. Several  BMs this shift. Skin per flowsheet. Safety maintained, bed alarm set. WCTM.

## 2020-04-25 NOTE — THERAPY
"Physical Therapy Evaluation completed.   Bed Mobility:  Supine to Sit: Supervised  Transfers: Sit to Stand: Supervised  Gait: Level Of Assist: Supervised with Front-Wheel Walker       Plan of Care: Will benefit from Physical Therapy 3 times per week  Discharge Recommendations: Equipment: Front-Wheel Walker. Post-acute therapy Discharge to home with home health for additional skilled therapy services.    64 year old male who presents with left leg redness and swelling with a PMHx of cellulitis in left LE, hyponatremia, CKD, HTN, Meth abuse.  Patient demonstrates very slow gait, and diminished balance, however strength is fair.  Patient demonstrates fair functional mobility, requiring safe stair ambulation in order to be discharge home.  Patient likely has the strength to ambulate stairs, but will be followed by physical therapy to ensure patient is safe to ambulate stairs while in house.  Recommend discharge home with home health once medically stable and cleared with stair ambulation.     Grant Larson PT     See \"Rehab Therapy-Acute\" Patient Summary Report for complete documentation.     "

## 2020-04-25 NOTE — CARE PLAN
Problem: Bowel/Gastric:  Goal: Normal bowel function is maintained or improved  Outcome: PROGRESSING SLOWER THAN EXPECTED  Intervention: Collaborate with Interdisciplinary Team for optimal positioning for bowel evacuation  Note: Bowel regimen continued as ordered     Problem: Knowledge Deficit  Goal: Knowledge of disease process/condition, treatment plan, diagnostic tests, and medications will improve  Outcome: PROGRESSING AS EXPECTED  Intervention: Explain information regarding disease process/condition, treatment plan, diagnostic tests, and medications and document in education  Note: Pt updated on POC

## 2020-04-25 NOTE — PROGRESS NOTES
"[facility] Daily Progress Note    Date of Service  4/25/2020    Chief Complaint  64 y.o. male admitted 4/16/2020 with Left leg redness and swelling.    Hospital Course    64 y.o. male who presented 4/16/2020 with left leg redness and swelling.  Patient has history of venous stasis dermatitis of both legs, and venous stasis ulcers of the left leg.  Patient states that he was discharged from wound care a month ago and said the ulcers had resolved.  But then a week ago he noted that ulcers came back he also noticed increasing redness and swelling and pain.  He denied having any fever or chills, cough or congestion, shortness of breath, abdominal pain, nausea or vomiting, or diarrhea.  On review of his previous wound cultures, he has history of MRSA, Pseudomonas, Serratia, and group B streptococcus.    Cultures on this admissioon grew MRSA and GAS. ID has been following his care. He was eventually transitioned to oral Zyvox. He was improving. His WBCs normalized, he remained afebrile, and he was looking stable for discharge home with antibiotics and wound care. However, after wound care changed the dressing 4/21, mian pus was noted from a tunneling lesion, suggesting need for further I&D.  Patient was taken to the operating room by Dr. Israel.  Initial reports from healthcare personnel suggest there was some component of \"necrotizing fat\".  Antibiotics were broadened slightly to include Unasyn.  The case was discussed with infectious disease who will be following closely.    Patient's undergone multiple washout procedures.  Cultures from deep tissue in the operating room have grown Pseudomonas and GAS.  Antibiotics have been extended accordingly and he is receiving Zyvox and Zosyn.    Interval Problem Update  VSS and WNL   T-max overnight 99, P 69, RR 18, 92% on RA, 128/83  WBCs 10, H/H 9.5/30.5, stable although slightly declining  On Zyvox and Zosyn for GAS and Pseudomonas from OR surgical cx 4/22  Renal " function consistent with CKD 2-3 - stable  Very little bowel activity  Prior Zyvox dose termed this a.m.    Consultants/Specialty  ID  LPS  Ortho    Code Status  full    Disposition  pending    Review of Systems  Review of Systems   Constitutional: Negative for chills, malaise/fatigue and weight loss.   HENT: Negative for ear discharge, ear pain and nosebleeds.    Eyes: Negative for double vision, photophobia and pain.   Respiratory: Negative for hemoptysis, sputum production and shortness of breath.    Cardiovascular: Negative for chest pain, palpitations and orthopnea.   Gastrointestinal: Positive for constipation. Negative for abdominal pain, blood in stool, diarrhea, melena, nausea and vomiting.   Genitourinary: Negative for frequency, hematuria and urgency.   Musculoskeletal: Negative for back pain, myalgias and neck pain.        Left leg pain   Skin: Negative for itching.   Neurological: Negative for dizziness, tingling and headaches.        Physical Exam  Temp:  [36.2 °C (97.2 °F)-37.2 °C (99 °F)] 36.7 °C (98.1 °F)  Pulse:  [60-72] 69  Resp:  [15-18] 18  BP: (119-162)/(73-96) 128/83  SpO2:  [89 %-100 %] 92 %    Physical Exam  Vitals signs and nursing note reviewed.   Constitutional:       Interventions: Face mask in place.   HENT:      Head: Normocephalic and atraumatic.      Nose: Nose normal.   Eyes:      Conjunctiva/sclera: Conjunctivae normal.      Pupils: Pupils are equal, round, and reactive to light.   Neck:      Musculoskeletal: Neck supple.   Cardiovascular:      Rate and Rhythm: Normal rate and regular rhythm.      Heart sounds: Murmur present. Systolic murmur present with a grade of 1/6. No friction rub. No S3 or S4 sounds.    Pulmonary:      Effort: No respiratory distress.   Abdominal:      General: Bowel sounds are normal. There is no distension.      Palpations: Abdomen is soft.   Musculoskeletal:      Right lower leg: No edema.      Left lower leg: No edema.      Comments: LLE with dressing  with scant amount of discharge    Skin:     General: Skin is warm and dry.   Neurological:      Mental Status: He is alert.         Fluids    Intake/Output Summary (Last 24 hours) at 4/25/2020 1048  Last data filed at 4/25/2020 0931  Gross per 24 hour   Intake 2804 ml   Output 770 ml   Net 2034 ml       Laboratory  Recent Labs     04/23/20  0033 04/24/20 0222 04/25/20  0043   WBC 11.3* 9.5 10.2   RBC 3.40* 3.26* 3.09*   HEMOGLOBIN 10.3* 10.0* 9.5*   HEMATOCRIT 32.6* 31.2* 30.5*   MCV 95.9 95.7 98.7*   MCH 30.3 30.7 30.7   MCHC 31.6* 32.1* 31.1*   RDW 53.4* 56.0* 56.4*   PLATELETCT 343 347 339   MPV 8.7* 8.5* 8.4*     Recent Labs     04/23/20  0033 04/24/20 0222 04/25/20  0043   SODIUM 137 139 136   POTASSIUM 4.8 5.1 5.0   CHLORIDE 104 106 103   CO2 21 23 22   GLUCOSE 117* 94 109*   BUN 22 24* 26*   CREATININE 1.37 1.22 1.37   CALCIUM 7.7* 7.7* 7.6*                   Imaging     Assessment/Plan  * Cellulitis of left lower extremity w tunneling abscess- (present on admission)  Assessment & Plan  Zyvox and Zosyn per ID  Zyvox termed 4/25 - reordered - duration per ID  Follow deep operative cultures  S/P washout 4/22 and 4/24  Cultures 4/22 thus far (+) for group A strep and Pseudomonas    -Arterial ultrasound on 12/27/2019 showed:No evidence of arterial insufficiency in either lower extremity       Venous stasis ulcer of left lower extremity (HCC)- (present on admission)  Assessment & Plan  Further direction from Ortho/LPS/Wound Team    Venous stasis dermatitis of both lower extremities- (present on admission)  Assessment & Plan  Wound care ongoing    CKD (chronic kidney disease) stage 3, GFR 30-59 ml/min ([facility])- (present on admission)  Assessment & Plan  Acute on chronic  At baseline now  Stop IV fluids    Essential hypertension- (present on admission)  Assessment & Plan  Controlled  IV Hydralazine as needed with parameters    Constipation  Assessment & Plan  Mag citrate  Cont senna, miralax    Normocytic  anemia  Assessment & Plan  TIBC    Methamphetamine abuse (HCC)- (present on admission)  Assessment & Plan  Positive  UDS    Alcoholism (HCC)- (present on admission)  Assessment & Plan  B12 supratherapeutic, B1 still pending  Periodic serum magnesium    Anxiety- (present on admission)  Assessment & Plan  Will reduce patient's sertraline given risk of serotonin syndrome w zyvox  Stop PRN Elavil. Hasn't used, and drug interaction possible  Consider low dose H2B or benzo if sleep assistance needed       VTE prophylaxis: heparin

## 2020-04-25 NOTE — PROGRESS NOTES
Assumed care of patient at 1900 from Erin ROGERS. Pt resting in bed with no signs of distress. Assessment competed, VSS, pt is A/Ox4. Pt reporting 5/10 pain in his left leg, medicated per the MAR. Pt denies any further needs. Bed in the lowest locked position, bed alarm active, call light in reach.

## 2020-04-25 NOTE — CARE PLAN
Problem: Safety  Goal: Will remain free from falls  Outcome: PROGRESSING AS EXPECTED     Problem: Pain Management  Goal: Pain level will decrease to patient's comfort goal  Outcome: PROGRESSING AS EXPECTED  Flowsheets (Taken 4/24/2020 2103)  Pain Rating Scale (NPRS): 5  Note: Medicated per the MAR.

## 2020-04-26 PROBLEM — E87.5 HYPERKALEMIA: Status: ACTIVE | Noted: 2020-04-26

## 2020-04-26 LAB
ALBUMIN SERPL BCP-MCNC: 2.3 G/DL (ref 3.2–4.9)
ALBUMIN/GLOB SERPL: 0.5 G/DL
ALP SERPL-CCNC: 81 U/L (ref 30–99)
ALT SERPL-CCNC: 9 U/L (ref 2–50)
ANION GAP SERPL CALC-SCNC: 10 MMOL/L (ref 7–16)
AST SERPL-CCNC: 18 U/L (ref 12–45)
BASOPHILS # BLD AUTO: 0.5 % (ref 0–1.8)
BASOPHILS # BLD: 0.04 K/UL (ref 0–0.12)
BILIRUB SERPL-MCNC: 0.2 MG/DL (ref 0.1–1.5)
BUN SERPL-MCNC: 23 MG/DL (ref 8–22)
CALCIUM SERPL-MCNC: 7.7 MG/DL (ref 8.5–10.5)
CHLORIDE SERPL-SCNC: 105 MMOL/L (ref 96–112)
CO2 SERPL-SCNC: 24 MMOL/L (ref 20–33)
CREAT SERPL-MCNC: 1.28 MG/DL (ref 0.5–1.4)
EOSINOPHIL # BLD AUTO: 0.2 K/UL (ref 0–0.51)
EOSINOPHIL NFR BLD: 2.6 % (ref 0–6.9)
ERYTHROCYTE [DISTWIDTH] IN BLOOD BY AUTOMATED COUNT: 57.6 FL (ref 35.9–50)
GLOBULIN SER CALC-MCNC: 4.4 G/DL (ref 1.9–3.5)
GLUCOSE SERPL-MCNC: 97 MG/DL (ref 65–99)
HCT VFR BLD AUTO: 30.2 % (ref 42–52)
HGB BLD-MCNC: 9.4 G/DL (ref 14–18)
IMM GRANULOCYTES # BLD AUTO: 0.05 K/UL (ref 0–0.11)
IMM GRANULOCYTES NFR BLD AUTO: 0.7 % (ref 0–0.9)
LYMPHOCYTES # BLD AUTO: 1.15 K/UL (ref 1–4.8)
LYMPHOCYTES NFR BLD: 15.1 % (ref 22–41)
MCH RBC QN AUTO: 30.8 PG (ref 27–33)
MCHC RBC AUTO-ENTMCNC: 31.1 G/DL (ref 33.7–35.3)
MCV RBC AUTO: 99 FL (ref 81.4–97.8)
MONOCYTES # BLD AUTO: 0.48 K/UL (ref 0–0.85)
MONOCYTES NFR BLD AUTO: 6.3 % (ref 0–13.4)
NEUTROPHILS # BLD AUTO: 5.71 K/UL (ref 1.82–7.42)
NEUTROPHILS NFR BLD: 74.8 % (ref 44–72)
NRBC # BLD AUTO: 0 K/UL
NRBC BLD-RTO: 0 /100 WBC
PLATELET # BLD AUTO: 328 K/UL (ref 164–446)
PMV BLD AUTO: 8.4 FL (ref 9–12.9)
POTASSIUM SERPL-SCNC: 5.5 MMOL/L (ref 3.6–5.5)
PROT SERPL-MCNC: 6.7 G/DL (ref 6–8.2)
RBC # BLD AUTO: 3.05 M/UL (ref 4.7–6.1)
SODIUM SERPL-SCNC: 139 MMOL/L (ref 135–145)
WBC # BLD AUTO: 7.6 K/UL (ref 4.8–10.8)

## 2020-04-26 PROCEDURE — 700111 HCHG RX REV CODE 636 W/ 250 OVERRIDE (IP): Performed by: NURSE PRACTITIONER

## 2020-04-26 PROCEDURE — 80053 COMPREHEN METABOLIC PANEL: CPT

## 2020-04-26 PROCEDURE — A9270 NON-COVERED ITEM OR SERVICE: HCPCS | Performed by: FAMILY MEDICINE

## 2020-04-26 PROCEDURE — 99233 SBSQ HOSP IP/OBS HIGH 50: CPT | Performed by: INTERNAL MEDICINE

## 2020-04-26 PROCEDURE — 700102 HCHG RX REV CODE 250 W/ 637 OVERRIDE(OP): Performed by: NURSE PRACTITIONER

## 2020-04-26 PROCEDURE — 85025 COMPLETE CBC W/AUTO DIFF WBC: CPT

## 2020-04-26 PROCEDURE — 700111 HCHG RX REV CODE 636 W/ 250 OVERRIDE (IP): Performed by: INTERNAL MEDICINE

## 2020-04-26 PROCEDURE — 700105 HCHG RX REV CODE 258: Performed by: INTERNAL MEDICINE

## 2020-04-26 PROCEDURE — 700102 HCHG RX REV CODE 250 W/ 637 OVERRIDE(OP): Performed by: FAMILY MEDICINE

## 2020-04-26 PROCEDURE — 770001 HCHG ROOM/CARE - MED/SURG/GYN PRIV*

## 2020-04-26 PROCEDURE — A9270 NON-COVERED ITEM OR SERVICE: HCPCS | Performed by: NURSE PRACTITIONER

## 2020-04-26 PROCEDURE — 36415 COLL VENOUS BLD VENIPUNCTURE: CPT

## 2020-04-26 RX ORDER — AMOXICILLIN 250 MG
2 CAPSULE ORAL 2 TIMES DAILY
Status: DISCONTINUED | OUTPATIENT
Start: 2020-04-26 | End: 2020-05-19

## 2020-04-26 RX ORDER — FUROSEMIDE 20 MG/1
20 TABLET ORAL
Status: DISCONTINUED | OUTPATIENT
Start: 2020-04-26 | End: 2020-04-26

## 2020-04-26 RX ORDER — FUROSEMIDE 10 MG/ML
20 INJECTION INTRAMUSCULAR; INTRAVENOUS ONCE
Status: COMPLETED | OUTPATIENT
Start: 2020-04-26 | End: 2020-04-26

## 2020-04-26 RX ADMIN — ATORVASTATIN CALCIUM 40 MG: 40 TABLET, FILM COATED ORAL at 05:00

## 2020-04-26 RX ADMIN — PIPERACILLIN AND TAZOBACTAM 4.5 G: 4; .5 INJECTION, POWDER, LYOPHILIZED, FOR SOLUTION INTRAVENOUS; PARENTERAL at 20:34

## 2020-04-26 RX ADMIN — FUROSEMIDE 20 MG: 10 INJECTION, SOLUTION INTRAMUSCULAR; INTRAVENOUS at 08:16

## 2020-04-26 RX ADMIN — OXYCODONE 10 MG: 5 TABLET ORAL at 12:16

## 2020-04-26 RX ADMIN — OXYCODONE 10 MG: 5 TABLET ORAL at 20:34

## 2020-04-26 RX ADMIN — OXYCODONE 10 MG: 5 TABLET ORAL at 08:16

## 2020-04-26 RX ADMIN — LINEZOLID 600 MG: 600 TABLET, FILM COATED ORAL at 05:00

## 2020-04-26 RX ADMIN — SERTRALINE HYDROCHLORIDE 50 MG: 50 TABLET ORAL at 05:00

## 2020-04-26 RX ADMIN — OXYCODONE 10 MG: 5 TABLET ORAL at 02:13

## 2020-04-26 RX ADMIN — OXYCODONE 10 MG: 5 TABLET ORAL at 16:23

## 2020-04-26 RX ADMIN — PIPERACILLIN AND TAZOBACTAM 4.5 G: 4; .5 INJECTION, POWDER, LYOPHILIZED, FOR SOLUTION INTRAVENOUS; PARENTERAL at 05:00

## 2020-04-26 RX ADMIN — HEPARIN SODIUM 5000 UNITS: 5000 INJECTION, SOLUTION INTRAVENOUS; SUBCUTANEOUS at 05:00

## 2020-04-26 RX ADMIN — LINEZOLID 600 MG: 600 TABLET, FILM COATED ORAL at 16:23

## 2020-04-26 RX ADMIN — FERROUS GLUCONATE 324 MG: 324 TABLET ORAL at 08:16

## 2020-04-26 RX ADMIN — PIPERACILLIN AND TAZOBACTAM 4.5 G: 4; .5 INJECTION, POWDER, LYOPHILIZED, FOR SOLUTION INTRAVENOUS; PARENTERAL at 12:16

## 2020-04-26 RX ADMIN — HEPARIN SODIUM 5000 UNITS: 5000 INJECTION, SOLUTION INTRAVENOUS; SUBCUTANEOUS at 12:16

## 2020-04-26 RX ADMIN — POLYETHYLENE GLYCOL 3350 1 PACKET: 17 POWDER, FOR SOLUTION ORAL at 04:59

## 2020-04-26 RX ADMIN — SENNOSIDES AND DOCUSATE SODIUM 1 TABLET: 8.6; 5 TABLET ORAL at 05:00

## 2020-04-26 ASSESSMENT — ENCOUNTER SYMPTOMS
PHOTOPHOBIA: 0
DIARRHEA: 0
CONSTIPATION: 1
BACK PAIN: 0
VOMITING: 0
MYALGIAS: 0
WEIGHT LOSS: 0
DIZZINESS: 0
ORTHOPNEA: 0
DOUBLE VISION: 0
FOCAL WEAKNESS: 0
SHORTNESS OF BREATH: 0
EYE PAIN: 0
NAUSEA: 0
MYALGIAS: 1
CHILLS: 0
HEADACHES: 0
HEMOPTYSIS: 0
CONSTIPATION: 0
SPUTUM PRODUCTION: 0
ABDOMINAL PAIN: 0
PALPITATIONS: 0
TINGLING: 0
BLOOD IN STOOL: 0
FEVER: 0
NECK PAIN: 0

## 2020-04-26 NOTE — CARE PLAN
Problem: Communication  Goal: The ability to communicate needs accurately and effectively will improve  Outcome: PROGRESSING AS EXPECTED     Problem: Safety  Goal: Will remain free from injury  Outcome: PROGRESSING AS EXPECTED  Goal: Will remain free from falls  Outcome: PROGRESSING AS EXPECTED     Problem: Infection  Goal: Will remain free from infection  Outcome: PROGRESSING AS EXPECTED     Problem: Venous Thromboembolism (VTW)/Deep Vein Thrombosis (DVT) Prevention:  Goal: Patient will participate in Venous Thrombosis (VTE)/Deep Vein Thrombosis (DVT)Prevention Measures  Outcome: PROGRESSING AS EXPECTED     Problem: Bowel/Gastric:  Goal: Normal bowel function is maintained or improved  Outcome: PROGRESSING AS EXPECTED  Goal: Will not experience complications related to bowel motility  Outcome: PROGRESSING AS EXPECTED     Problem: Knowledge Deficit  Goal: Knowledge of disease process/condition, treatment plan, diagnostic tests, and medications will improve  Outcome: PROGRESSING AS EXPECTED  Goal: Knowledge of the prescribed therapeutic regimen will improve  Outcome: PROGRESSING AS EXPECTED     Problem: Discharge Barriers/Planning  Goal: Patient's continuum of care needs will be met  Outcome: PROGRESSING AS EXPECTED     Problem: Pain Management  Goal: Pain level will decrease to patient's comfort goal  Outcome: PROGRESSING AS EXPECTED     Problem: Skin Integrity  Goal: Risk for impaired skin integrity will decrease  Outcome: PROGRESSING AS EXPECTED     Problem: Mobility  Goal: Risk for activity intolerance will decrease  Outcome: PROGRESSING AS EXPECTED

## 2020-04-26 NOTE — PROGRESS NOTES
"[facility] Medicine Daily Progress Note    Date of Service  4/26/2020    Chief Complaint  64 y.o. male admitted 4/16/2020 with Left leg redness and swelling.    Hospital Course    64 y.o. male who presented 4/16/2020 with left leg redness and swelling.  Patient has history of venous stasis dermatitis of both legs, and venous stasis ulcers of the left leg.  Patient states that he was discharged from wound care a month ago and said the ulcers had resolved.  But then a week ago he noted that ulcers came back he also noticed increasing redness and swelling and pain.  He denied having any fever or chills, cough or congestion, shortness of breath, abdominal pain, nausea or vomiting, or diarrhea.  On review of his previous wound cultures, he has history of MRSA, Pseudomonas, Serratia, and group B streptococcus.    Cultures on this admissioon grew MRSA and GAS. ID has been following his care. He was eventually transitioned to oral Zyvox. He was improving. His WBCs normalized, he remained afebrile, and he was looking stable for discharge home with antibiotics and wound care. However, after wound care changed the dressing 4/21, mian pus was noted from a tunneling lesion, suggesting need for further I&D.  Patient was taken to the operating room by Dr. Israel.  Initial reports from healthcare personnel suggest there was some component of \"necrotizing fat\".  Antibiotics were broadened slightly to include Unasyn.  The case was discussed with infectious disease who will be following closely.    Patient's undergone multiple washout procedures.  Cultures from deep tissue in the operating room have grown Pseudomonas and GAS.  Antibiotics have been extended accordingly and he is receiving Zyvox and Zosyn.    Interval Problem Update  No acute distress  Occasional mild desats w sleep. >90% on RA while awake  WBC normal  H/H stable  K 5.5  Ortho plans washout tomorrow morning. NPO at MN. Hold heparin tomorrow  Vac still producing " drainage  Several BMs following mag citrate    Consultants/Specialty  ID  LPS  Ortho    Code Status  full    Disposition  pending    Review of Systems  Review of Systems   Constitutional: Negative for chills, malaise/fatigue and weight loss.   HENT: Negative for ear discharge, ear pain and nosebleeds.    Eyes: Negative for double vision, photophobia and pain.   Respiratory: Negative for hemoptysis, sputum production and shortness of breath.    Cardiovascular: Negative for chest pain, palpitations and orthopnea.   Gastrointestinal: Negative for abdominal pain, blood in stool, constipation, diarrhea, melena, nausea and vomiting.   Genitourinary: Negative for frequency, hematuria and urgency.   Musculoskeletal: Negative for back pain, myalgias and neck pain.        Left leg pain   Skin: Negative for itching.   Neurological: Negative for dizziness, tingling and headaches.        Physical Exam  Temp:  [36.3 °C (97.4 °F)-36.6 °C (97.9 °F)] 36.4 °C (97.6 °F)  Pulse:  [63-99] 99  Resp:  [17-18] 17  BP: (134-149)/(85-99) 149/99  SpO2:  [91 %-97 %] 96 %    Physical Exam  Vitals signs and nursing note reviewed.   Constitutional:       Interventions: Face mask in place.   HENT:      Head: Normocephalic and atraumatic.      Nose: Nose normal.   Eyes:      Conjunctiva/sclera: Conjunctivae normal.      Pupils: Pupils are equal, round, and reactive to light.   Neck:      Musculoskeletal: Neck supple.   Cardiovascular:      Rate and Rhythm: Normal rate and regular rhythm.      Heart sounds: Murmur present. Systolic murmur present with a grade of 1/6. No friction rub. No S3 or S4 sounds.    Pulmonary:      Effort: No respiratory distress.   Abdominal:      General: Bowel sounds are normal. There is no distension.      Palpations: Abdomen is soft.   Musculoskeletal:      Right lower leg: No edema.      Left lower leg: No edema.      Comments: SUSAN sevilla CDI   Skin:     General: Skin is warm and dry.   Neurological:      Mental Status:  He is alert.         Fluids    Intake/Output Summary (Last 24 hours) at 4/26/2020 1040  Last data filed at 4/26/2020 0800  Gross per 24 hour   Intake 650 ml   Output 1175 ml   Net -525 ml       Laboratory  Recent Labs     04/24/20 0222 04/25/20 0043 04/26/20  0057   WBC 9.5 10.2 7.6   RBC 3.26* 3.09* 3.05*   HEMOGLOBIN 10.0* 9.5* 9.4*   HEMATOCRIT 31.2* 30.5* 30.2*   MCV 95.7 98.7* 99.0*   MCH 30.7 30.7 30.8   MCHC 32.1* 31.1* 31.1*   RDW 56.0* 56.4* 57.6*   PLATELETCT 347 339 328   MPV 8.5* 8.4* 8.4*     Recent Labs     04/24/20 0222 04/25/20 0043 04/26/20  0057   SODIUM 139 136 139   POTASSIUM 5.1 5.0 5.5   CHLORIDE 106 103 105   CO2 23 22 24   GLUCOSE 94 109* 97   BUN 24* 26* 23*   CREATININE 1.22 1.37 1.28   CALCIUM 7.7* 7.6* 7.7*                   Imaging     Assessment/Plan  * Cellulitis of left lower extremity w tunneling abscess- (present on admission)  Assessment & Plan  Zyvox and Zosyn per ID  Zyvox termed 4/25 - reordered - duration per ID  Follow deep operative cultures  S/P washout 4/22 and 4/24  Cultures 4/22 thus far (+) for group A strep and Pseudomonas    -Arterial ultrasound on 12/27/2019 showed:No evidence of arterial insufficiency in either lower extremity       Venous stasis ulcer of left lower extremity (HCC)- (present on admission)  Assessment & Plan  Further direction from Ortho/LPS/Wound Team    Hyperkalemia  Assessment & Plan  Upper limit of normal  IV lasix x 1  Daily bmp  Consider kayexalate tomorrow if still elevated    Venous stasis dermatitis of both lower extremities- (present on admission)  Assessment & Plan  Wound care ongoing    CKD (chronic kidney disease) stage 3, GFR 30-59 ml/min ([facility])- (present on admission)  Assessment & Plan  Acute on chronic  At baseline now      Essential hypertension- (present on admission)  Assessment & Plan  Controlled  IV Hydralazine as needed with parameters    Constipation  Assessment & Plan  Cont senna, miralax    Normocytic anemia  Assessment &  Plan  TIBC    Methamphetamine abuse (HCC)- (present on admission)  Assessment & Plan  Positive  UDS    Alcoholism (HCC)- (present on admission)  Assessment & Plan  B12 supratherapeutic, B1 still pending  Periodic serum magnesium    Anxiety- (present on admission)  Assessment & Plan  Will reduce patient's sertraline given risk of serotonin syndrome w zyvox  Stop PRN Elavil. Hasn't used, and drug interaction possible  Consider low dose H2B or benzo if sleep assistance needed       VTE prophylaxis: heparin

## 2020-04-26 NOTE — CARE PLAN
Problem: Pain Management  Goal: Pain level will decrease to patient's comfort goal  Outcome: PROGRESSING AS EXPECTED     Problem: Mobility  Goal: Risk for activity intolerance will decrease  Outcome: PROGRESSING AS EXPECTED

## 2020-04-26 NOTE — PROGRESS NOTES
POD#2  S/P I&D  Cultures positive for Group A strep and MRSA  ABX per ID  Will need repeat I&D monday

## 2020-04-26 NOTE — PROGRESS NOTES
Assumed care of patient at 1900 from Erin ROGERS. Pt resting in bed with no signs of distress. Assessment competed, VSS, pt is A/Ox4. Pt reported 7/10 pain in his left lower leg, medicated per the MAR. Pt denies any further needs. Bed in the lowest locked position, call light in reach.

## 2020-04-26 NOTE — PROGRESS NOTES
Infectious Disease Progress Note    Author: Ronak Naylor M.D. Date & Time of service: 2020  12:10 PM    Chief Complaint:  Follow-up for cellulitis    Interval History:   afebrile, white count 10.2, tolerating Zyvox and Zosyn.  Reports feeling better overall.   afebrile, white count 7.6, tolerating antibiotics with no new issues.    Labs Reviewed and Medications Reviewed.    Review of Systems:  Review of Systems   Constitutional: Negative for chills, fever and weight loss.   Respiratory: Negative for shortness of breath.    Gastrointestinal: Positive for constipation. Negative for abdominal pain, diarrhea, nausea and vomiting.   Genitourinary: Negative for dysuria.   Musculoskeletal: Positive for myalgias.   Skin: Negative for itching and rash.   Neurological: Negative for focal weakness.   All other systems reviewed and are negative.      Hemodynamics:  Temp (24hrs), Av.5 °C (97.7 °F), Min:36.3 °C (97.4 °F), Max:36.6 °C (97.9 °F)  Temperature: 36.4 °C (97.6 °F)  Pulse  Av.8  Min: 60  Max: 127   Blood Pressure: 149/99       Physical Exam:  Physical Exam  Vitals signs and nursing note reviewed.   Constitutional:       General: He is not in acute distress.     Appearance: He is not ill-appearing.   HENT:      Head: Normocephalic and atraumatic.      Right Ear: External ear normal.      Left Ear: External ear normal.      Mouth/Throat:      Mouth: Mucous membranes are moist.      Pharynx: No oropharyngeal exudate.      Comments: Poor dentition  Eyes:      General: No scleral icterus.     Conjunctiva/sclera: Conjunctivae normal.      Pupils: Pupils are equal, round, and reactive to light.   Neck:      Musculoskeletal: Neck supple.   Cardiovascular:      Rate and Rhythm: Normal rate and regular rhythm.      Pulses: Normal pulses.      Heart sounds: Normal heart sounds. No murmur.   Pulmonary:      Effort: Pulmonary effort is normal. No respiratory distress.      Breath sounds: No wheezing.      Abdominal:      General: There is no distension.      Palpations: Abdomen is soft.      Tenderness: There is no abdominal tenderness.   Musculoskeletal: Normal range of motion.         General: Swelling present.      Comments: Left lower extremity with dressing and drain in place   Lymphadenopathy:      Cervical: No cervical adenopathy.   Skin:     General: Skin is warm.      Findings: No erythema or rash.   Neurological:      General: No focal deficit present.      Mental Status: He is alert and oriented to person, place, and time.   Psychiatric:         Mood and Affect: Mood normal.         Behavior: Behavior normal.      Comments: Pleasant     No change compared to 4/25    Meds:    Current Facility-Administered Medications:   •  senna-docusate  •  sertraline  •  linezolid  •  heparin  •  polyethylene glycol/lytes  •  magnesium hydroxide  •  [COMPLETED] piperacillin-tazobactam **AND** piperacillin-tazobactam  •  atorvastatin  •  ferrous gluconate  •  acetaminophen  •  ondansetron  •  ondansetron  •  promethazine  •  promethazine  •  prochlorperazine  •  hydrALAZINE  •  oxyCODONE immediate-release  •  morphine injection    Labs:  Recent Labs     04/24/20 0222 04/25/20 0043 04/26/20 0057   WBC 9.5 10.2 7.6   RBC 3.26* 3.09* 3.05*   HEMOGLOBIN 10.0* 9.5* 9.4*   HEMATOCRIT 31.2* 30.5* 30.2*   MCV 95.7 98.7* 99.0*   MCH 30.7 30.7 30.8   RDW 56.0* 56.4* 57.6*   PLATELETCT 347 339 328   MPV 8.5* 8.4* 8.4*   NEUTSPOLYS 81.00* 87.00* 74.80*   LYMPHOCYTES 9.60* 6.80* 15.10*   MONOCYTES 6.90 5.20 6.30   EOSINOPHILS 1.40 0.20 2.60   BASOPHILS 0.20 0.30 0.50     Recent Labs     04/24/20 0222 04/25/20 0043 04/26/20 0057   SODIUM 139 136 139   POTASSIUM 5.1 5.0 5.5   CHLORIDE 106 103 105   CO2 23 22 24   GLUCOSE 94 109* 97   BUN 24* 26* 23*     Recent Labs     04/24/20 0222 04/25/20 0043 04/26/20 0057   ALBUMIN 2.3* 2.2* 2.3*   TBILIRUBIN 0.4 0.3 0.2   ALKPHOSPHAT 99 92 81   TOTPROTEIN 6.8 6.7 6.7   ALTSGPT 12 12 9    ASTSGOT 17 21 18   CREATININE 1.22 1.37 1.28       Imaging:  Ct-extremity, Lower With Left    Result Date: 4/21/2020 4/21/2020 5:16 PM HISTORY/REASON FOR EXAM:  Lower leg pain, suspected osteomyelitis. TECHNIQUE/EXAM DESCRIPTION AND NUMBER OF VIEWS:  CT scan of the LEFT lower extremity with contrast, with reconstructions. Thin helical 3 mm sections were obtained from the distal femur through the proximal tibia/fibula. Sagittal and coronal multiplanar reconstructions were generated from the axial images. A total of 100 mL of Omnipaque 350 nonionic contrast was administered  IV without complication. Up to date radiation dose reduction adjustments have been utilized to meet ALARA standards for radiation dose reduction. COMPARISON: 4/16/2020. FINDINGS: No acute fracture or dislocation is identified. Cellulitis of the lower leg, with soft tissue prominence and subcutaneous air. No subjacent cortical bone destruction to suggest osteomyelitis. No drainable abscess. There is chronic appearing valgus deviation of the foot at the subtalar joint, and there is suspected pes planovalgus. Moderate subtalar osteoarthrosis. Multifocal mild to moderate osteoarthrosis in the midfoot. Mild multifocal interphalangeal osteoarthrosis. Reactive popliteal lymphadenopathy.     1. No evidence of osteomyelitis. 2. Cellulitis and soft tissue organs of the left lower leg. Subcutaneous air without drainable abscess. 3. Suspected pes planovalgus deformity. Multifocal osteoarthrosis of the midfoot.    Ct-extremity, Lower With Left    Result Date: 4/16/2020 4/16/2020 1:45 PM HISTORY/REASON FOR EXAM:  Lower leg swelling/redness, cellulitis suspected. Left lower extremity edema and redness TECHNIQUE/EXAM DESCRIPTION AND NUMBER OF VIEWS:  CT scan of the LEFT lower extremity with contrast, with reconstructions. Thin helical 3 mm sections were obtained from the distal femur through the proximal tibia/fibula. Sagittal and coronal multiplanar  reconstructions were generated from the axial images. A total of 100 mL of Omnipaque 350 nonionic contrast was administered  IV without complication. Up to date radiation dose reduction adjustments have been utilized to meet ALARA standards for radiation dose reduction. COMPARISON: 12/26/2019 FINDINGS: There is diffuse subcutaneous edema in the lower leg. The overlying skin is markedly thickened. There is curvilinear fluid deep to the fascia, but superficial to the gastrocnemius muscle extending from the knee joint to the ankle. Edema extends into the deeper fascial planes. No soft tissue gas is identified. No definite muscle necrosis is identified. No thrombus is identified. The Achilles tendon is intact. The bones are osteopenic. No cortical destruction is identified.     1.  Extensive subcutaneous edema with overlying skin thickening. Inflammation extends along the fascial planes with curvilinear fluid superficial to the gastrocnemius muscle. No soft tissue gas to suggest necrotizing fasciitis. 2.  Osteopenia    Us-renal    Result Date: 4/19/2020 4/19/2020 10:53 AM HISTORY/REASON FOR EXAM:  Acute renal failure TECHNIQUE/EXAM DESCRIPTION: Renal ultrasound. COMPARISON:  08/15/2018 FINDINGS: The right kidney measures 8.11 cm. The left kidney measures 9.17 cm. There is no hydronephrosis. There are no abnormal calcifications. Small right pleural effusion is identified. The bladder demonstrates no focal wall abnormality.     1.  Right kidney appears smaller than left kidney which could be due to atrophy. 2.  No hydronephrosis. 3.  Incidentally noted small right pleural effusion.      Micro:  Results     Procedure Component Value Units Date/Time    CULTURE WOUND W/ GRAM STAIN [[id number]]  (Abnormal)  (Susceptibility) Collected:  04/22/20 1203    Order Status:  Completed Specimen:  Wound Updated:  04/25/20 1510     Significant Indicator POS     Source WND     Site Left Leg     Culture Result -     Gram Stain Result  Moderate WBCs.  No organisms seen.       Culture Result Pseudomonas aeruginosa  Light growth  P.aeruginosa may develop resistance during prolonged therapy  with all antibiotics. Isolates that are initially susceptible  may become resistant within three to four days after  initiation of therapy. Testing of repeat isolates may be  warranted.        Beta Hemolytic Streptococcus group A  Rare growth      Narrative:       CALL  Rivas  [facility] tel. [phone],  CALLED  [facility] tel. [phone] 04/24/2020, 11:06, RB PERF. RESULTS CALLED  TO:[phone]  Surgery - swabs received    Susceptibility     Pseudomonas aeruginosa (1)     Antibiotic Interpretation Microscan Method Status    Ceftazidime Sensitive <=1 mcg/mL SHANON Final    Ciprofloxacin Resistant >2 mcg/mL SHANON Final    Cefepime Sensitive 4 mcg/mL SHANON Final    Amikacin Sensitive <=16 mcg/mL SHANON Final    Gentamicin Sensitive <=4 mcg/mL SHANON Final    Tobramycin Sensitive <=4 mcg/mL SHANON Final    Meropenem Sensitive <=1 mcg/mL SHANON Final    Pip/Tazobactam Sensitive 64 mcg/mL SHANON Final                   Anaerobic Culture [[id number]] Collected:  04/22/20 1203    Order Status:  Completed Specimen:  Wound Updated:  04/25/20 1510     Significant Indicator NEG     Source WND     Site Left Leg     Culture Result No Anaerobes isolated.    Narrative:       CALL  Rivas  [facility] tel. [phone],  CALLED  [facility] tel. [phone] 04/24/2020, 11:06, RB PERF. RESULTS CALLED  TO:[phone]  Surgery - swabs received    GRAM STAIN [[id number]] Collected:  04/22/20 1203    Order Status:  Completed Specimen:  Wound Updated:  04/22/20 2059     Significant Indicator .     Source WND     Site Left Leg     Gram Stain Result Moderate WBCs.  No organisms seen.      Narrative:       Surgery - swabs received    BLOOD CULTURE [[id number]] Collected:  04/16/20 1246    Order Status:  Completed Specimen:  Blood from Peripheral Updated:  04/21/20 1500     Significant Indicator NEG     Source BLD     Site PERIPHERAL     Culture Result  "No growth after 5 days of incubation.    Narrative:       Per Hospital Policy: Only change Specimen Src: to \"Line\" if  specified by physician order.  Left Forearm/Arm    BLOOD CULTURE [[id number]] Collected:  04/16/20 1140    Order Status:  Completed Specimen:  Blood from Peripheral Updated:  04/21/20 1300     Significant Indicator NEG     Source BLD     Site PERIPHERAL     Culture Result No growth after 5 days of incubation.    Narrative:       Per Hospital Policy: Only change Specimen Src: to \"Line\" if  specified by physician order.  No site indicated          Assessment:  64-year-old male with methamphetamine abuse, hepatitis C status post treatment, bilateral lower extremity venous stasis dermatitis with ulcerations, admitted with left leg cellulitis and fever    Pertinent Diagnoses  Left lower extremity cellulitis  Chronic venous stasis changes bilateral lower extremities  Methamphetamine abuse  Hepatitis C status post treatment    Plan:  -CT of the left lower extremity on 4/21 with no osteomyelitis or drainable abscess  -Patient underwent I&D on 4/22 down to muscle, returned to the OR on 4/24 for repeat I&D of multiple compartments and placement of wound VAC skin at the margins was noted to be necrotic  -Plan for repeat I&D on Monday  -Blood culture showed no growth to date  -Wound cultures positive for group A strep and MRSA  -Intraoperative wound cx showed pseudomonas and GAS  -Continue linezolid and IV zosyn  -Monitor CBC while on linezolid  -Continue wound care and recommend follow-up with outpatient wound clinic    Discussed with internal medicine, Dr. Ayon.  ID will follow.  Please call with questions.  "

## 2020-04-26 NOTE — PROGRESS NOTES
Assumed care of pt at 0715. Report received and bedside rounding completed with night RN. Pt is calm no SOB, or in any acute distress noted.    Pt is considered a high fall risk. edu provided on risk level. Fall precautions in place,  bed alarm. - Treaded non slip socks. Bed locked. Communication board updated with POC. Call light and pt belongings within reach - pt makes needs known - hourly rounding in place. See flowsheets for further assessment.     Some c/o pain this am- medication given. IVF stopped. Up with 1x assist- BM this am.

## 2020-04-27 ENCOUNTER — APPOINTMENT (OUTPATIENT)
Dept: WOUND CARE | Facility: MEDICAL CENTER | Age: 65
End: 2020-04-27
Attending: NURSE PRACTITIONER
Payer: MEDICAID

## 2020-04-27 ENCOUNTER — ANESTHESIA (OUTPATIENT)
Dept: SURGERY | Facility: MEDICAL CENTER | Age: 65
DRG: 571 | End: 2020-04-27
Payer: MEDICAID

## 2020-04-27 ENCOUNTER — ANESTHESIA EVENT (OUTPATIENT)
Dept: SURGERY | Facility: MEDICAL CENTER | Age: 65
DRG: 571 | End: 2020-04-27
Payer: MEDICAID

## 2020-04-27 LAB
ALBUMIN SERPL BCP-MCNC: 2.4 G/DL (ref 3.2–4.9)
ALBUMIN/GLOB SERPL: 0.5 G/DL
ALP SERPL-CCNC: 80 U/L (ref 30–99)
ALT SERPL-CCNC: 6 U/L (ref 2–50)
ANION GAP SERPL CALC-SCNC: 9 MMOL/L (ref 7–16)
AST SERPL-CCNC: 16 U/L (ref 12–45)
BASOPHILS # BLD AUTO: 0.5 % (ref 0–1.8)
BASOPHILS # BLD: 0.04 K/UL (ref 0–0.12)
BILIRUB SERPL-MCNC: 0.3 MG/DL (ref 0.1–1.5)
BUN SERPL-MCNC: 21 MG/DL (ref 8–22)
CALCIUM SERPL-MCNC: 7.9 MG/DL (ref 8.5–10.5)
CHLORIDE SERPL-SCNC: 103 MMOL/L (ref 96–112)
CO2 SERPL-SCNC: 24 MMOL/L (ref 20–33)
CREAT SERPL-MCNC: 1.22 MG/DL (ref 0.5–1.4)
EOSINOPHIL # BLD AUTO: 0.17 K/UL (ref 0–0.51)
EOSINOPHIL NFR BLD: 2.1 % (ref 0–6.9)
ERYTHROCYTE [DISTWIDTH] IN BLOOD BY AUTOMATED COUNT: 55.6 FL (ref 35.9–50)
ERYTHROCYTE [SEDIMENTATION RATE] IN BLOOD BY WESTERGREN METHOD: >120 MM/HOUR (ref 0–20)
GLOBULIN SER CALC-MCNC: 4.4 G/DL (ref 1.9–3.5)
GLUCOSE SERPL-MCNC: 95 MG/DL (ref 65–99)
HCT VFR BLD AUTO: 30.5 % (ref 42–52)
HGB BLD-MCNC: 9.7 G/DL (ref 14–18)
IMM GRANULOCYTES # BLD AUTO: 0.03 K/UL (ref 0–0.11)
IMM GRANULOCYTES NFR BLD AUTO: 0.4 % (ref 0–0.9)
LYMPHOCYTES # BLD AUTO: 1.08 K/UL (ref 1–4.8)
LYMPHOCYTES NFR BLD: 13.1 % (ref 22–41)
MAGNESIUM SERPL-MCNC: 2.2 MG/DL (ref 1.5–2.5)
MCH RBC QN AUTO: 30.7 PG (ref 27–33)
MCHC RBC AUTO-ENTMCNC: 31.8 G/DL (ref 33.7–35.3)
MCV RBC AUTO: 96.5 FL (ref 81.4–97.8)
MONOCYTES # BLD AUTO: 0.54 K/UL (ref 0–0.85)
MONOCYTES NFR BLD AUTO: 6.5 % (ref 0–13.4)
NEUTROPHILS # BLD AUTO: 6.4 K/UL (ref 1.82–7.42)
NEUTROPHILS NFR BLD: 77.4 % (ref 44–72)
NRBC # BLD AUTO: 0 K/UL
NRBC BLD-RTO: 0 /100 WBC
PHOSPHATE SERPL-MCNC: 3.2 MG/DL (ref 2.5–4.5)
PLATELET # BLD AUTO: 334 K/UL (ref 164–446)
PMV BLD AUTO: 8.1 FL (ref 9–12.9)
POTASSIUM SERPL-SCNC: 5 MMOL/L (ref 3.6–5.5)
PROT SERPL-MCNC: 6.8 G/DL (ref 6–8.2)
RBC # BLD AUTO: 3.16 M/UL (ref 4.7–6.1)
SODIUM SERPL-SCNC: 136 MMOL/L (ref 135–145)
WBC # BLD AUTO: 8.3 K/UL (ref 4.8–10.8)

## 2020-04-27 PROCEDURE — 160048 HCHG OR STATISTICAL LEVEL 1-5: Performed by: ORTHOPAEDIC SURGERY

## 2020-04-27 PROCEDURE — 160002 HCHG RECOVERY MINUTES (STAT): Performed by: ORTHOPAEDIC SURGERY

## 2020-04-27 PROCEDURE — 83735 ASSAY OF MAGNESIUM: CPT

## 2020-04-27 PROCEDURE — 501330 HCHG SET, CYSTO IRRIG TUBING: Performed by: ORTHOPAEDIC SURGERY

## 2020-04-27 PROCEDURE — 85025 COMPLETE CBC W/AUTO DIFF WBC: CPT

## 2020-04-27 PROCEDURE — A9270 NON-COVERED ITEM OR SERVICE: HCPCS | Performed by: FAMILY MEDICINE

## 2020-04-27 PROCEDURE — 97530 THERAPEUTIC ACTIVITIES: CPT

## 2020-04-27 PROCEDURE — 501488 HCHG SUCTION CANN, WOUNDVAC TRAC: Performed by: ORTHOPAEDIC SURGERY

## 2020-04-27 PROCEDURE — 700111 HCHG RX REV CODE 636 W/ 250 OVERRIDE (IP): Performed by: INTERNAL MEDICINE

## 2020-04-27 PROCEDURE — 97535 SELF CARE MNGMENT TRAINING: CPT

## 2020-04-27 PROCEDURE — 85652 RBC SED RATE AUTOMATED: CPT

## 2020-04-27 PROCEDURE — 700102 HCHG RX REV CODE 250 W/ 637 OVERRIDE(OP): Performed by: ANESTHESIOLOGY

## 2020-04-27 PROCEDURE — A9270 NON-COVERED ITEM OR SERVICE: HCPCS | Performed by: ANESTHESIOLOGY

## 2020-04-27 PROCEDURE — A9270 NON-COVERED ITEM OR SERVICE: HCPCS | Performed by: NURSE PRACTITIONER

## 2020-04-27 PROCEDURE — 84100 ASSAY OF PHOSPHORUS: CPT

## 2020-04-27 PROCEDURE — 160027 HCHG SURGERY MINUTES - 1ST 30 MINS LEVEL 2: Performed by: ORTHOPAEDIC SURGERY

## 2020-04-27 PROCEDURE — 700105 HCHG RX REV CODE 258: Performed by: ANESTHESIOLOGY

## 2020-04-27 PROCEDURE — 700101 HCHG RX REV CODE 250: Performed by: ORTHOPAEDIC SURGERY

## 2020-04-27 PROCEDURE — 700105 HCHG RX REV CODE 258: Performed by: INTERNAL MEDICINE

## 2020-04-27 PROCEDURE — 80053 COMPREHEN METABOLIC PANEL: CPT

## 2020-04-27 PROCEDURE — 160038 HCHG SURGERY MINUTES - EA ADDL 1 MIN LEVEL 2: Performed by: ORTHOPAEDIC SURGERY

## 2020-04-27 PROCEDURE — 160036 HCHG PACU - EA ADDL 30 MINS PHASE I: Performed by: ORTHOPAEDIC SURGERY

## 2020-04-27 PROCEDURE — 770001 HCHG ROOM/CARE - MED/SURG/GYN PRIV*

## 2020-04-27 PROCEDURE — 99232 SBSQ HOSP IP/OBS MODERATE 35: CPT | Performed by: INTERNAL MEDICINE

## 2020-04-27 PROCEDURE — A6454 SELF-ADHER BAND W>=3" <5"/YD: HCPCS | Performed by: ORTHOPAEDIC SURGERY

## 2020-04-27 PROCEDURE — 160009 HCHG ANES TIME/MIN: Performed by: ORTHOPAEDIC SURGERY

## 2020-04-27 PROCEDURE — 160035 HCHG PACU - 1ST 60 MINS PHASE I: Performed by: ORTHOPAEDIC SURGERY

## 2020-04-27 PROCEDURE — 700111 HCHG RX REV CODE 636 W/ 250 OVERRIDE (IP): Performed by: ANESTHESIOLOGY

## 2020-04-27 PROCEDURE — A6550 NEG PRES WOUND THER DRSG SET: HCPCS | Performed by: ORTHOPAEDIC SURGERY

## 2020-04-27 PROCEDURE — 700102 HCHG RX REV CODE 250 W/ 637 OVERRIDE(OP): Performed by: FAMILY MEDICINE

## 2020-04-27 PROCEDURE — 0JBP0ZZ EXCISION OF LEFT LOWER LEG SUBCUTANEOUS TISSUE AND FASCIA, OPEN APPROACH: ICD-10-PCS | Performed by: ORTHOPAEDIC SURGERY

## 2020-04-27 PROCEDURE — 700102 HCHG RX REV CODE 250 W/ 637 OVERRIDE(OP): Performed by: NURSE PRACTITIONER

## 2020-04-27 PROCEDURE — 99233 SBSQ HOSP IP/OBS HIGH 50: CPT | Performed by: INTERNAL MEDICINE

## 2020-04-27 PROCEDURE — 501838 HCHG SUTURE GENERAL: Performed by: ORTHOPAEDIC SURGERY

## 2020-04-27 PROCEDURE — 36415 COLL VENOUS BLD VENIPUNCTURE: CPT

## 2020-04-27 PROCEDURE — 700101 HCHG RX REV CODE 250: Performed by: ANESTHESIOLOGY

## 2020-04-27 PROCEDURE — A6223 GAUZE >16<=48 NO W/SAL W/O B: HCPCS | Performed by: ORTHOPAEDIC SURGERY

## 2020-04-27 PROCEDURE — 500881 HCHG PACK, EXTREMITY: Performed by: ORTHOPAEDIC SURGERY

## 2020-04-27 RX ORDER — OXYCODONE HCL 5 MG/5 ML
10 SOLUTION, ORAL ORAL
Status: COMPLETED | OUTPATIENT
Start: 2020-04-27 | End: 2020-04-27

## 2020-04-27 RX ORDER — MAGNESIUM HYDROXIDE 1200 MG/15ML
LIQUID ORAL
Status: COMPLETED | OUTPATIENT
Start: 2020-04-27 | End: 2020-04-27

## 2020-04-27 RX ORDER — HYDROMORPHONE HYDROCHLORIDE 1 MG/ML
0.2 INJECTION, SOLUTION INTRAMUSCULAR; INTRAVENOUS; SUBCUTANEOUS
Status: DISCONTINUED | OUTPATIENT
Start: 2020-04-27 | End: 2020-04-28 | Stop reason: HOSPADM

## 2020-04-27 RX ORDER — MIDAZOLAM HYDROCHLORIDE 1 MG/ML
INJECTION INTRAMUSCULAR; INTRAVENOUS PRN
Status: DISCONTINUED | OUTPATIENT
Start: 2020-04-27 | End: 2020-04-27 | Stop reason: SURG

## 2020-04-27 RX ORDER — MAGNESIUM SULFATE HEPTAHYDRATE 40 MG/ML
INJECTION, SOLUTION INTRAVENOUS PRN
Status: DISCONTINUED | OUTPATIENT
Start: 2020-04-27 | End: 2020-04-27 | Stop reason: SURG

## 2020-04-27 RX ORDER — SODIUM CHLORIDE, SODIUM LACTATE, POTASSIUM CHLORIDE, CALCIUM CHLORIDE 600; 310; 30; 20 MG/100ML; MG/100ML; MG/100ML; MG/100ML
INJECTION, SOLUTION INTRAVENOUS
Status: DISCONTINUED | OUTPATIENT
Start: 2020-04-27 | End: 2020-04-27 | Stop reason: SURG

## 2020-04-27 RX ORDER — DIPHENHYDRAMINE HYDROCHLORIDE 50 MG/ML
12.5 INJECTION INTRAMUSCULAR; INTRAVENOUS
Status: DISCONTINUED | OUTPATIENT
Start: 2020-04-27 | End: 2020-04-28 | Stop reason: HOSPADM

## 2020-04-27 RX ORDER — SODIUM CHLORIDE, SODIUM LACTATE, POTASSIUM CHLORIDE, CALCIUM CHLORIDE 600; 310; 30; 20 MG/100ML; MG/100ML; MG/100ML; MG/100ML
INJECTION, SOLUTION INTRAVENOUS CONTINUOUS
Status: DISCONTINUED | OUTPATIENT
Start: 2020-04-27 | End: 2020-04-28 | Stop reason: HOSPADM

## 2020-04-27 RX ORDER — HYDROMORPHONE HYDROCHLORIDE 1 MG/ML
0.4 INJECTION, SOLUTION INTRAMUSCULAR; INTRAVENOUS; SUBCUTANEOUS
Status: DISCONTINUED | OUTPATIENT
Start: 2020-04-27 | End: 2020-04-28 | Stop reason: HOSPADM

## 2020-04-27 RX ORDER — HYDRALAZINE HYDROCHLORIDE 20 MG/ML
5 INJECTION INTRAMUSCULAR; INTRAVENOUS
Status: DISCONTINUED | OUTPATIENT
Start: 2020-04-27 | End: 2020-04-28 | Stop reason: HOSPADM

## 2020-04-27 RX ORDER — KETAMINE HYDROCHLORIDE 50 MG/ML
INJECTION, SOLUTION INTRAMUSCULAR; INTRAVENOUS PRN
Status: DISCONTINUED | OUTPATIENT
Start: 2020-04-27 | End: 2020-04-27 | Stop reason: SURG

## 2020-04-27 RX ORDER — HALOPERIDOL 5 MG/ML
1 INJECTION INTRAMUSCULAR
Status: DISCONTINUED | OUTPATIENT
Start: 2020-04-27 | End: 2020-04-28 | Stop reason: HOSPADM

## 2020-04-27 RX ORDER — OXYCODONE HCL 5 MG/5 ML
5 SOLUTION, ORAL ORAL
Status: COMPLETED | OUTPATIENT
Start: 2020-04-27 | End: 2020-04-27

## 2020-04-27 RX ORDER — LABETALOL HYDROCHLORIDE 5 MG/ML
5 INJECTION, SOLUTION INTRAVENOUS
Status: DISCONTINUED | OUTPATIENT
Start: 2020-04-27 | End: 2020-04-28 | Stop reason: HOSPADM

## 2020-04-27 RX ORDER — ONDANSETRON 2 MG/ML
INJECTION INTRAMUSCULAR; INTRAVENOUS PRN
Status: DISCONTINUED | OUTPATIENT
Start: 2020-04-27 | End: 2020-04-27 | Stop reason: SURG

## 2020-04-27 RX ORDER — HYDROMORPHONE HYDROCHLORIDE 1 MG/ML
0.1 INJECTION, SOLUTION INTRAMUSCULAR; INTRAVENOUS; SUBCUTANEOUS
Status: DISCONTINUED | OUTPATIENT
Start: 2020-04-27 | End: 2020-04-28 | Stop reason: HOSPADM

## 2020-04-27 RX ORDER — DEXAMETHASONE SODIUM PHOSPHATE 4 MG/ML
INJECTION, SOLUTION INTRA-ARTICULAR; INTRALESIONAL; INTRAMUSCULAR; INTRAVENOUS; SOFT TISSUE PRN
Status: DISCONTINUED | OUTPATIENT
Start: 2020-04-27 | End: 2020-04-27 | Stop reason: SURG

## 2020-04-27 RX ORDER — ONDANSETRON 2 MG/ML
4 INJECTION INTRAMUSCULAR; INTRAVENOUS
Status: DISCONTINUED | OUTPATIENT
Start: 2020-04-27 | End: 2020-04-28 | Stop reason: HOSPADM

## 2020-04-27 RX ORDER — PHENYLEPHRINE HCL IN 0.9% NACL 0.5 MG/5ML
SYRINGE (ML) INTRAVENOUS PRN
Status: DISCONTINUED | OUTPATIENT
Start: 2020-04-27 | End: 2020-04-27 | Stop reason: SURG

## 2020-04-27 RX ORDER — MIDAZOLAM HYDROCHLORIDE 1 MG/ML
1 INJECTION INTRAMUSCULAR; INTRAVENOUS
Status: DISCONTINUED | OUTPATIENT
Start: 2020-04-27 | End: 2020-04-28 | Stop reason: HOSPADM

## 2020-04-27 RX ORDER — KETOROLAC TROMETHAMINE 30 MG/ML
INJECTION, SOLUTION INTRAMUSCULAR; INTRAVENOUS PRN
Status: DISCONTINUED | OUTPATIENT
Start: 2020-04-27 | End: 2020-04-27 | Stop reason: SURG

## 2020-04-27 RX ADMIN — FENTANYL CITRATE 50 MCG: 50 INJECTION, SOLUTION INTRAMUSCULAR; INTRAVENOUS at 21:50

## 2020-04-27 RX ADMIN — OXYCODONE 10 MG: 5 TABLET ORAL at 01:09

## 2020-04-27 RX ADMIN — Medication 200 MCG: at 21:26

## 2020-04-27 RX ADMIN — ATORVASTATIN CALCIUM 40 MG: 40 TABLET, FILM COATED ORAL at 04:43

## 2020-04-27 RX ADMIN — MAGNESIUM SULFATE IN WATER 4 G: 40 INJECTION, SOLUTION INTRAVENOUS at 21:24

## 2020-04-27 RX ADMIN — PROPOFOL 50 MG: 10 INJECTION, EMULSION INTRAVENOUS at 21:55

## 2020-04-27 RX ADMIN — FENTANYL CITRATE 25 MCG: 50 INJECTION, SOLUTION INTRAMUSCULAR; INTRAVENOUS at 23:10

## 2020-04-27 RX ADMIN — HYDROMORPHONE HYDROCHLORIDE 0.2 MG: 1 INJECTION, SOLUTION INTRAMUSCULAR; INTRAVENOUS; SUBCUTANEOUS at 22:55

## 2020-04-27 RX ADMIN — FERROUS GLUCONATE 324 MG: 324 TABLET ORAL at 08:23

## 2020-04-27 RX ADMIN — Medication 200 MCG: at 21:42

## 2020-04-27 RX ADMIN — LINEZOLID 600 MG: 600 TABLET, FILM COATED ORAL at 18:15

## 2020-04-27 RX ADMIN — PROPOFOL 250 MG: 10 INJECTION, EMULSION INTRAVENOUS at 21:12

## 2020-04-27 RX ADMIN — PIPERACILLIN AND TAZOBACTAM 4.5 G: 4; .5 INJECTION, POWDER, LYOPHILIZED, FOR SOLUTION INTRAVENOUS; PARENTERAL at 12:22

## 2020-04-27 RX ADMIN — OXYCODONE 10 MG: 5 TABLET ORAL at 18:15

## 2020-04-27 RX ADMIN — HYDROMORPHONE HYDROCHLORIDE 0.4 MG: 1 INJECTION, SOLUTION INTRAMUSCULAR; INTRAVENOUS; SUBCUTANEOUS at 22:50

## 2020-04-27 RX ADMIN — KETOROLAC TROMETHAMINE 15 MG: 30 INJECTION, SOLUTION INTRAMUSCULAR at 21:54

## 2020-04-27 RX ADMIN — OXYCODONE HYDROCHLORIDE 10 MG: 5 SOLUTION ORAL at 22:15

## 2020-04-27 RX ADMIN — SERTRALINE HYDROCHLORIDE 50 MG: 50 TABLET ORAL at 04:43

## 2020-04-27 RX ADMIN — HYDROMORPHONE HYDROCHLORIDE 0.4 MG: 1 INJECTION, SOLUTION INTRAMUSCULAR; INTRAVENOUS; SUBCUTANEOUS at 22:40

## 2020-04-27 RX ADMIN — OXYCODONE 10 MG: 5 TABLET ORAL at 12:22

## 2020-04-27 RX ADMIN — FENTANYL CITRATE 50 MCG: 50 INJECTION, SOLUTION INTRAMUSCULAR; INTRAVENOUS at 21:31

## 2020-04-27 RX ADMIN — PIPERACILLIN AND TAZOBACTAM 4.5 G: 4; .5 INJECTION, POWDER, LYOPHILIZED, FOR SOLUTION INTRAVENOUS; PARENTERAL at 04:44

## 2020-04-27 RX ADMIN — LINEZOLID 600 MG: 600 TABLET, FILM COATED ORAL at 04:44

## 2020-04-27 RX ADMIN — KETAMINE HYDROCHLORIDE 50 MG: 50 INJECTION INTRAMUSCULAR; INTRAVENOUS at 21:15

## 2020-04-27 RX ADMIN — MIDAZOLAM HYDROCHLORIDE 2 MG: 1 INJECTION, SOLUTION INTRAMUSCULAR; INTRAVENOUS at 21:06

## 2020-04-27 RX ADMIN — FENTANYL CITRATE 25 MCG: 50 INJECTION, SOLUTION INTRAMUSCULAR; INTRAVENOUS at 23:15

## 2020-04-27 RX ADMIN — ONDANSETRON 4 MG: 2 INJECTION INTRAMUSCULAR; INTRAVENOUS at 21:54

## 2020-04-27 RX ADMIN — FENTANYL CITRATE 50 MCG: 50 INJECTION, SOLUTION INTRAMUSCULAR; INTRAVENOUS at 22:35

## 2020-04-27 RX ADMIN — SODIUM CHLORIDE, POTASSIUM CHLORIDE, SODIUM LACTATE AND CALCIUM CHLORIDE: 600; 310; 30; 20 INJECTION, SOLUTION INTRAVENOUS at 21:06

## 2020-04-27 RX ADMIN — Medication 200 MCG: at 21:51

## 2020-04-27 RX ADMIN — OXYCODONE 10 MG: 5 TABLET ORAL at 08:23

## 2020-04-27 RX ADMIN — DOCUSATE SODIUM 50 MG AND SENNOSIDES 8.6 MG 2 TABLET: 8.6; 5 TABLET, FILM COATED ORAL at 04:49

## 2020-04-27 RX ADMIN — DEXAMETHASONE SODIUM PHOSPHATE 10 MG: 4 INJECTION, SOLUTION INTRA-ARTICULAR; INTRALESIONAL; INTRAMUSCULAR; INTRAVENOUS; SOFT TISSUE at 21:15

## 2020-04-27 ASSESSMENT — ENCOUNTER SYMPTOMS
PALPITATIONS: 0
NAUSEA: 0
WEIGHT LOSS: 0
DOUBLE VISION: 0
EYE PAIN: 0
DIARRHEA: 0
TINGLING: 0
HEMOPTYSIS: 0
CHILLS: 0
MYALGIAS: 0
PHOTOPHOBIA: 0
FOCAL WEAKNESS: 0
BACK PAIN: 0
SPUTUM PRODUCTION: 0
HEADACHES: 0
ORTHOPNEA: 0
MYALGIAS: 1
SHORTNESS OF BREATH: 0
BLOOD IN STOOL: 0
NECK PAIN: 0
VOMITING: 0
DIZZINESS: 0
FEVER: 0
CONSTIPATION: 0
ABDOMINAL PAIN: 0

## 2020-04-27 ASSESSMENT — COGNITIVE AND FUNCTIONAL STATUS - GENERAL
SUGGESTED CMS G CODE MODIFIER DAILY ACTIVITY: CJ
HELP NEEDED FOR BATHING: A LITTLE
DAILY ACTIVITIY SCORE: 22
DRESSING REGULAR LOWER BODY CLOTHING: A LITTLE

## 2020-04-27 NOTE — PROGRESS NOTES
Infectious Disease Progress Note    Author: Karen Hemphill M.D. Date & Time of service: 2020  9:13 AM    Chief Complaint:  Follow-up for LLE infection    Interval History:   afebrile, white count 10.2, tolerating Zyvox and Zosyn.  Reports feeling better overall.   afebrile, white count 7.6, tolerating antibiotics with no new issues.   afebrile WBC 8.3 plan for repeat I&D today, denies pain, no new clinical issues      Labs Reviewed and Medications Reviewed.    Review of Systems:  Review of Systems   Constitutional: Negative for chills, fever and weight loss.   Respiratory: Negative for shortness of breath.    Gastrointestinal: Negative for abdominal pain, constipation, diarrhea, nausea and vomiting.   Genitourinary: Negative for dysuria.   Musculoskeletal: Positive for myalgias.   Skin: Negative for itching and rash.   Neurological: Negative for focal weakness.   All other systems reviewed and are negative.      Hemodynamics:  Temp (24hrs), Av.9 °C (98.4 °F), Min:36.8 °C (98.2 °F), Max:37.1 °C (98.8 °F)  Temperature: 37.1 °C (98.8 °F)  Pulse  Av.5  Min: 60  Max: 127   Blood Pressure: 156/82       Physical Exam:  Physical Exam  Vitals signs and nursing note reviewed.   Constitutional:       General: He is not in acute distress.     Appearance: He is not ill-appearing.   HENT:      Head: Normocephalic and atraumatic.      Right Ear: External ear normal.      Left Ear: External ear normal.      Mouth/Throat:      Mouth: Mucous membranes are moist.      Pharynx: No oropharyngeal exudate.      Comments: Poor dentition  Eyes:      General: No scleral icterus.     Conjunctiva/sclera: Conjunctivae normal.      Pupils: Pupils are equal, round, and reactive to light.   Neck:      Musculoskeletal: Neck supple.   Cardiovascular:      Rate and Rhythm: Normal rate and regular rhythm.      Pulses: Normal pulses.      Heart sounds: Normal heart sounds. No murmur.   Pulmonary:      Effort: Pulmonary  effort is normal. No respiratory distress.      Breath sounds: No wheezing.   Abdominal:      General: There is no distension.      Palpations: Abdomen is soft.      Tenderness: There is no abdominal tenderness.   Musculoskeletal: Normal range of motion.         General: Swelling present.      Comments: Left lower extremity with dressing and wound vac in place   Lymphadenopathy:      Cervical: No cervical adenopathy.   Skin:     General: Skin is warm.      Findings: No erythema or rash.   Neurological:      General: No focal deficit present.      Mental Status: He is alert and oriented to person, place, and time.   Psychiatric:         Mood and Affect: Mood normal.         Behavior: Behavior normal.      Comments: Pleasant     No change compared to 4/25    Meds:    Current Facility-Administered Medications:   •  senna-docusate  •  sertraline  •  linezolid  •  polyethylene glycol/lytes  •  magnesium hydroxide  •  [COMPLETED] piperacillin-tazobactam **AND** piperacillin-tazobactam  •  atorvastatin  •  ferrous gluconate  •  acetaminophen  •  ondansetron  •  ondansetron  •  promethazine  •  promethazine  •  prochlorperazine  •  hydrALAZINE  •  oxyCODONE immediate-release  •  morphine injection    Labs:  Recent Labs     04/25/20 0043 04/26/20 0057 04/27/20  0244   WBC 10.2 7.6 8.3   RBC 3.09* 3.05* 3.16*   HEMOGLOBIN 9.5* 9.4* 9.7*   HEMATOCRIT 30.5* 30.2* 30.5*   MCV 98.7* 99.0* 96.5   MCH 30.7 30.8 30.7   RDW 56.4* 57.6* 55.6*   PLATELETCT 339 328 334   MPV 8.4* 8.4* 8.1*   NEUTSPOLYS 87.00* 74.80* 77.40*   LYMPHOCYTES 6.80* 15.10* 13.10*   MONOCYTES 5.20 6.30 6.50   EOSINOPHILS 0.20 2.60 2.10   BASOPHILS 0.30 0.50 0.50     Recent Labs     04/25/20 0043 04/26/20 0057 04/27/20  0244   SODIUM 136 139 136   POTASSIUM 5.0 5.5 5.0   CHLORIDE 103 105 103   CO2 22 24 24   GLUCOSE 109* 97 95   BUN 26* 23* 21     Recent Labs     04/25/20  0043 04/26/20 0057 04/27/20  0244   ALBUMIN 2.2* 2.3* 2.4*   TBILIRUBIN 0.3 0.2 0.3      ALKPHOSPHAT 92 81 80   TOTPROTEIN 6.7 6.7 6.8   ALTSGPT 12 9 6   ASTSGOT 21 18 16   CREATININE 1.37 1.28 1.22       Imaging:  Ct-extremity, Lower With Left    Result Date: 4/21/2020 4/21/2020 5:16 PM HISTORY/REASON FOR EXAM:  Lower leg pain, suspected osteomyelitis. TECHNIQUE/EXAM DESCRIPTION AND NUMBER OF VIEWS:  CT scan of the LEFT lower extremity with contrast, with reconstructions. Thin helical 3 mm sections were obtained from the distal femur through the proximal tibia/fibula. Sagittal and coronal multiplanar reconstructions were generated from the axial images. A total of 100 mL of Omnipaque 350 nonionic contrast was administered  IV without complication. Up to date radiation dose reduction adjustments have been utilized to meet ALARA standards for radiation dose reduction. COMPARISON: 4/16/2020. FINDINGS: No acute fracture or dislocation is identified. Cellulitis of the lower leg, with soft tissue prominence and subcutaneous air. No subjacent cortical bone destruction to suggest osteomyelitis. No drainable abscess. There is chronic appearing valgus deviation of the foot at the subtalar joint, and there is suspected pes planovalgus. Moderate subtalar osteoarthrosis. Multifocal mild to moderate osteoarthrosis in the midfoot. Mild multifocal interphalangeal osteoarthrosis. Reactive popliteal lymphadenopathy.     1. No evidence of osteomyelitis. 2. Cellulitis and soft tissue organs of the left lower leg. Subcutaneous air without drainable abscess. 3. Suspected pes planovalgus deformity. Multifocal osteoarthrosis of the midfoot.    Ct-extremity, Lower With Left    Result Date: 4/16/2020 4/16/2020 1:45 PM HISTORY/REASON FOR EXAM:  Lower leg swelling/redness, cellulitis suspected. Left lower extremity edema and redness TECHNIQUE/EXAM DESCRIPTION AND NUMBER OF VIEWS:  CT scan of the LEFT lower extremity with contrast, with reconstructions. Thin helical 3 mm sections were obtained from the distal femur through the  proximal tibia/fibula. Sagittal and coronal multiplanar reconstructions were generated from the axial images. A total of 100 mL of Omnipaque 350 nonionic contrast was administered  IV without complication. Up to date radiation dose reduction adjustments have been utilized to meet ALARA standards for radiation dose reduction. COMPARISON: 12/26/2019 FINDINGS: There is diffuse subcutaneous edema in the lower leg. The overlying skin is markedly thickened. There is curvilinear fluid deep to the fascia, but superficial to the gastrocnemius muscle extending from the knee joint to the ankle. Edema extends into the deeper fascial planes. No soft tissue gas is identified. No definite muscle necrosis is identified. No thrombus is identified. The Achilles tendon is intact. The bones are osteopenic. No cortical destruction is identified.     1.  Extensive subcutaneous edema with overlying skin thickening. Inflammation extends along the fascial planes with curvilinear fluid superficial to the gastrocnemius muscle. No soft tissue gas to suggest necrotizing fasciitis. 2.  Osteopenia    Us-renal    Result Date: 4/19/2020 4/19/2020 10:53 AM HISTORY/REASON FOR EXAM:  Acute renal failure TECHNIQUE/EXAM DESCRIPTION: Renal ultrasound. COMPARISON:  08/15/2018 FINDINGS: The right kidney measures 8.11 cm. The left kidney measures 9.17 cm. There is no hydronephrosis. There are no abnormal calcifications. Small right pleural effusion is identified. The bladder demonstrates no focal wall abnormality.     1.  Right kidney appears smaller than left kidney which could be due to atrophy. 2.  No hydronephrosis. 3.  Incidentally noted small right pleural effusion.      Micro:  Results     Procedure Component Value Units Date/Time    CULTURE WOUND W/ GRAM STAIN [[id number]]  (Abnormal)  (Susceptibility) Collected:  04/22/20 1203    Order Status:  Completed Specimen:  Wound Updated:  04/25/20 1510     Significant Indicator POS     Source WND     Site  Left Leg     Culture Result -     Gram Stain Result Moderate WBCs.  No organisms seen.       Culture Result Pseudomonas aeruginosa  Light growth  P.aeruginosa may develop resistance during prolonged therapy  with all antibiotics. Isolates that are initially susceptible  may become resistant within three to four days after  initiation of therapy. Testing of repeat isolates may be  warranted.        Beta Hemolytic Streptococcus group A  Rare growth      Narrative:       CALL  Rivas  [facility] tel. [phone],  CALLED  [facility] tel. [phone] 04/24/2020, 11:06, RB PERF. RESULTS CALLED  TO:[phone]  Surgery - swabs received    Susceptibility     Pseudomonas aeruginosa (1)     Antibiotic Interpretation Microscan Method Status    Ceftazidime Sensitive <=1 mcg/mL SHANON Final    Ciprofloxacin Resistant >2 mcg/mL SHANON Final    Cefepime Sensitive 4 mcg/mL SHANON Final    Amikacin Sensitive <=16 mcg/mL SHANON Final    Gentamicin Sensitive <=4 mcg/mL SHANON Final    Tobramycin Sensitive <=4 mcg/mL SHANON Final    Meropenem Sensitive <=1 mcg/mL SHANON Final    Pip/Tazobactam Sensitive 64 mcg/mL SHANON Final                   Anaerobic Culture [[id number]] Collected:  04/22/20 1203    Order Status:  Completed Specimen:  Wound Updated:  04/25/20 1510     Significant Indicator NEG     Source WND     Site Left Leg     Culture Result No Anaerobes isolated.    Narrative:       CALL  Rivas  [facility] tel. [phone],  CALLED  [facility] tel. [phone] 04/24/2020, 11:06, RB PERF. RESULTS CALLED  TO:[phone]  Surgery - swabs received    GRAM STAIN [[id number]] Collected:  04/22/20 1203    Order Status:  Completed Specimen:  Wound Updated:  04/22/20 2059     Significant Indicator .     Source WND     Site Left Leg     Gram Stain Result Moderate WBCs.  No organisms seen.      Narrative:       Surgery - swabs received    BLOOD CULTURE [[id number]] Collected:  04/16/20 1246    Order Status:  Completed Specimen:  Blood from Peripheral Updated:  04/21/20 1500     Significant Indicator NEG      "Source BLD     Site PERIPHERAL     Culture Result No growth after 5 days of incubation.    Narrative:       Per Hospital Policy: Only change Specimen Src: to \"Line\" if  specified by physician order.  Left Forearm/Arm    BLOOD CULTURE [[id number]] Collected:  04/16/20 1140    Order Status:  Completed Specimen:  Blood from Peripheral Updated:  04/21/20 1300     Significant Indicator NEG     Source BLD     Site PERIPHERAL     Culture Result No growth after 5 days of incubation.    Narrative:       Per Hospital Policy: Only change Specimen Src: to \"Line\" if  specified by physician order.  No site indicated          Assessment:  64-year-old male with methamphetamine abuse, hepatitis C status post treatment, bilateral lower extremity venous stasis dermatitis with ulcerations, admitted with left leg cellulitis and fever    Pertinent Diagnoses  Left lower extremity cellulitis  Chronic venous stasis changes bilateral lower extremities  Methamphetamine abuse  Hepatitis C status post treatment    Plan:  -CT of the left lower extremity on 4/21 with no osteomyelitis or drainable abscess  -Patient underwent I&D on 4/22 down to muscle, returned to the OR on 4/24 for repeat I&D of multiple compartments and placement of wound VAC skin at the margins was noted to be necrotic  -Plan for possible repeat I&D today based on wound vac changes  -Blood culture showed no growth to date  -Wound cultures on 4/22 positive for group A strep and MRSA  -Intraoperative wound cx showed pseudomonas aeruginosa and GAS  -Continue linezolid and IV zosyn  -Monitor CBC while on linezolid  -Continue wound care and recommend follow-up with outpatient wound clinic post discharge    Discussed with internal medicine, Dr. Ayon and Dr. Lindy Fitzgerald.  ID will follow.  Please call with questions.  "

## 2020-04-27 NOTE — PROGRESS NOTES
Attempted to wean patient off of oxygen during night time. Patients O2 on room air during night ranged from 85-88 when patient fell asleep. Patient placed back on 2L during rest and oxygen saturation ranged from 90-92. Will continue to monitor.

## 2020-04-27 NOTE — CARE PLAN
Problem: Safety  Goal: Will remain free from falls  Outcome: PROGRESSING AS EXPECTED  Intervention: Assess risk factors for falls  Flowsheets (Taken 4/26/2020 0900 by Linda Sommers, C.N.A.)  Pt Calls for Assistance: Yes  Note: Patient is a high fall risk. Oriented to using call light for needs. Patient calls appropriately.      Problem: Bowel/Gastric:  Goal: Normal bowel function is maintained or improved  Outcome: PROGRESSING AS EXPECTED  Intervention: Educate patient and significant other/support system about diet, fluid intake, medications and activity to promote bowel function  Note: Patient bowels maintaining baseline regularity. Patient aware to alert staff if need for BM arises.

## 2020-04-27 NOTE — PROGRESS NOTES
"[facility] Medicine Daily Progress Note    Date of Service  4/27/2020    Chief Complaint  64 y.o. male admitted 4/16/2020 with Left leg redness and swelling.    Hospital Course    64 y.o. male who presented 4/16/2020 with left leg redness and swelling.  Patient has history of venous stasis dermatitis of both legs, and venous stasis ulcers of the left leg.  Patient states that he was discharged from wound care a month ago and said the ulcers had resolved.  But then a week ago he noted that ulcers came back he also noticed increasing redness and swelling and pain.  He denied having any fever or chills, cough or congestion, shortness of breath, abdominal pain, nausea or vomiting, or diarrhea.  On review of his previous wound cultures, he has history of MRSA, Pseudomonas, Serratia, and group B streptococcus.    Cultures on this admissioon grew MRSA and GAS. ID has been following his care. He was eventually transitioned to oral Zyvox. He was improving. His WBCs normalized, he remained afebrile, and he was looking stable for discharge home with antibiotics and wound care. However, after wound care changed the dressing 4/21, mian pus was noted from a tunneling lesion, suggesting need for further I&D.  Patient was taken to the operating room by Dr. Israel.  Initial reports from healthcare personnel suggest there was some component of \"necrotizing fat\".  Antibiotics were broadened slightly to include Unasyn.  The case was discussed with infectious disease who will be following closely.    Patient's undergone multiple washout procedures.  Cultures from deep tissue in the operating room have grown Pseudomonas and GAS.  Antibiotics have been extended accordingly and he is receiving Zyvox and Zosyn.    Interval Problem Update  No acute distress  K 5.0  Ortho plans washout today. NPO. Heparin held  Vac still producing drainage  Abd normal    Consultants/Specialty  ID  LPS  Ortho    Code " Status  full    Disposition  pending    Review of Systems  Review of Systems   Constitutional: Negative for chills, malaise/fatigue and weight loss.   HENT: Negative for ear discharge, ear pain and nosebleeds.    Eyes: Negative for double vision, photophobia and pain.   Respiratory: Negative for hemoptysis, sputum production and shortness of breath.    Cardiovascular: Negative for chest pain, palpitations and orthopnea.   Gastrointestinal: Negative for abdominal pain, blood in stool, constipation, diarrhea, melena, nausea and vomiting.   Genitourinary: Negative for frequency, hematuria and urgency.   Musculoskeletal: Negative for back pain, myalgias and neck pain.        Left leg pain   Skin: Negative for itching.   Neurological: Negative for dizziness, tingling and headaches.        Physical Exam  Temp:  [36.8 °C (98.2 °F)-37.1 °C (98.8 °F)] 37.1 °C (98.8 °F)  Pulse:  [] 102  Resp:  [17-19] 19  BP: (149-158)/(82-90) 156/82  SpO2:  [85 %-95 %] 92 %    Physical Exam  Vitals signs and nursing note reviewed.   Constitutional:       Interventions: Face mask in place.   HENT:      Head: Normocephalic and atraumatic.      Nose: Nose normal.   Eyes:      Conjunctiva/sclera: Conjunctivae normal.      Pupils: Pupils are equal, round, and reactive to light.   Neck:      Musculoskeletal: Neck supple.   Cardiovascular:      Rate and Rhythm: Normal rate and regular rhythm.      Heart sounds: Murmur present. Systolic murmur present with a grade of 1/6. No friction rub. No S3 or S4 sounds.    Pulmonary:      Effort: No respiratory distress.   Abdominal:      General: Bowel sounds are normal. There is no distension.      Palpations: Abdomen is soft.   Musculoskeletal:      Right lower leg: No edema.      Left lower leg: No edema.      Comments: LLE di CDI   Skin:     General: Skin is warm and dry.   Neurological:      Mental Status: He is alert.         Fluids    Intake/Output Summary (Last 24 hours) at 4/27/2020 1221  Last  data filed at 4/27/2020 0800  Gross per 24 hour   Intake 200 ml   Output 2125 ml   Net -1925 ml       Laboratory  Recent Labs     04/25/20 0043 04/26/20 0057 04/27/20  0244   WBC 10.2 7.6 8.3   RBC 3.09* 3.05* 3.16*   HEMOGLOBIN 9.5* 9.4* 9.7*   HEMATOCRIT 30.5* 30.2* 30.5*   MCV 98.7* 99.0* 96.5   MCH 30.7 30.8 30.7   MCHC 31.1* 31.1* 31.8*   RDW 56.4* 57.6* 55.6*   PLATELETCT 339 328 334   MPV 8.4* 8.4* 8.1*     Recent Labs     04/25/20 0043 04/26/20 0057 04/27/20  0244   SODIUM 136 139 136   POTASSIUM 5.0 5.5 5.0   CHLORIDE 103 105 103   CO2 22 24 24   GLUCOSE 109* 97 95   BUN 26* 23* 21   CREATININE 1.37 1.28 1.22   CALCIUM 7.6* 7.7* 7.9*                   Imaging     Assessment/Plan  * Cellulitis of left lower extremity w tunneling abscess- (present on admission)  Assessment & Plan  Zyvox and Zosyn per ID  Zyvox termed 4/25 - reordered - duration per ID  Follow deep operative cultures  S/P washout 4/22 and 4/24  Cultures 4/22 thus far (+) for group A strep and Pseudomonas    -Arterial ultrasound on 12/27/2019 showed:No evidence of arterial insufficiency in either lower extremity       Venous stasis ulcer of left lower extremity (HCC)- (present on admission)  Assessment & Plan  Further direction from Ortho/LPS/Wound Team    Hyperkalemia  Assessment & Plan  Upper limit of normal  IV lasix x 1  Daily bmp  Consider kayexalate tomorrow if still elevated    Venous stasis dermatitis of both lower extremities- (present on admission)  Assessment & Plan  Wound care ongoing    CKD (chronic kidney disease) stage 3, GFR 30-59 ml/min ([facility])- (present on admission)  Assessment & Plan  Acute on chronic  At baseline now      Essential hypertension- (present on admission)  Assessment & Plan  Controlled  IV Hydralazine as needed with parameters    Constipation  Assessment & Plan  Cont senna, miralax    Normocytic anemia  Assessment & Plan  TIBC    Methamphetamine abuse ([facility])- (present on admission)  Assessment & Plan  Positive   UDS    Alcoholism (HCC)- (present on admission)  Assessment & Plan  B12 supratherapeutic, B1 still pending  Periodic serum magnesium    Anxiety- (present on admission)  Assessment & Plan  Will reduce patient's sertraline given risk of serotonin syndrome w zyvox  Stop PRN Elavil. Hasn't used, and drug interaction possible  Consider low dose H2B or benzo if sleep assistance needed       VTE prophylaxis: heparin    I have performed a physical exam and reviewed and updated ROS and Plan today (4/27/2020). In review of yesterday's note (4/26/2020), there are no changes except as documented above.

## 2020-04-27 NOTE — PROGRESS NOTES
Bedside report received from day shift RN Brittany and patient. Patient is on MRSA precautions for wound in LLE. Fall precautions in place and hourly rounding continued. Patient vital signs /85   Pulse 62   Temp 36.8 °C (98.2 °F) (Temporal)   Resp 18   Ht 1.829 m (6')   Wt 90.2 kg (198 lb 13.7 oz)   SpO2 95%   BMI 26.97 kg/m² . Full assessment completed in flowsheet. Assumed patient care at 1900.

## 2020-04-28 LAB
ALBUMIN SERPL BCP-MCNC: 2.7 G/DL (ref 3.2–4.9)
ALBUMIN/GLOB SERPL: 0.7 G/DL
ALP SERPL-CCNC: 80 U/L (ref 30–99)
ALT SERPL-CCNC: 6 U/L (ref 2–50)
ANION GAP SERPL CALC-SCNC: 11 MMOL/L (ref 7–16)
AST SERPL-CCNC: 13 U/L (ref 12–45)
BASOPHILS # BLD AUTO: 0.4 % (ref 0–1.8)
BASOPHILS # BLD: 0.05 K/UL (ref 0–0.12)
BILIRUB SERPL-MCNC: 0.4 MG/DL (ref 0.1–1.5)
BUN SERPL-MCNC: 20 MG/DL (ref 8–22)
CALCIUM SERPL-MCNC: 7.9 MG/DL (ref 8.5–10.5)
CHLORIDE SERPL-SCNC: 98 MMOL/L (ref 96–112)
CO2 SERPL-SCNC: 21 MMOL/L (ref 20–33)
CREAT SERPL-MCNC: 1.25 MG/DL (ref 0.5–1.4)
EOSINOPHIL # BLD AUTO: 0 K/UL (ref 0–0.51)
EOSINOPHIL NFR BLD: 0 % (ref 0–6.9)
ERYTHROCYTE [DISTWIDTH] IN BLOOD BY AUTOMATED COUNT: 54.6 FL (ref 35.9–50)
GLOBULIN SER CALC-MCNC: 4.1 G/DL (ref 1.9–3.5)
GLUCOSE SERPL-MCNC: 170 MG/DL (ref 65–99)
HCT VFR BLD AUTO: 30.9 % (ref 42–52)
HGB BLD-MCNC: 9.9 G/DL (ref 14–18)
IMM GRANULOCYTES # BLD AUTO: 0.08 K/UL (ref 0–0.11)
IMM GRANULOCYTES NFR BLD AUTO: 0.6 % (ref 0–0.9)
LYMPHOCYTES # BLD AUTO: 0.32 K/UL (ref 1–4.8)
LYMPHOCYTES NFR BLD: 2.2 % (ref 22–41)
MCH RBC QN AUTO: 31 PG (ref 27–33)
MCHC RBC AUTO-ENTMCNC: 32 G/DL (ref 33.7–35.3)
MCV RBC AUTO: 96.9 FL (ref 81.4–97.8)
MONOCYTES # BLD AUTO: 0.15 K/UL (ref 0–0.85)
MONOCYTES NFR BLD AUTO: 1.1 % (ref 0–13.4)
NEUTROPHILS # BLD AUTO: 13.65 K/UL (ref 1.82–7.42)
NEUTROPHILS NFR BLD: 95.7 % (ref 44–72)
NRBC # BLD AUTO: 0 K/UL
NRBC BLD-RTO: 0 /100 WBC
PLATELET # BLD AUTO: 334 K/UL (ref 164–446)
PMV BLD AUTO: 8 FL (ref 9–12.9)
POTASSIUM SERPL-SCNC: 5.3 MMOL/L (ref 3.6–5.5)
PROT SERPL-MCNC: 6.8 G/DL (ref 6–8.2)
RBC # BLD AUTO: 3.19 M/UL (ref 4.7–6.1)
SODIUM SERPL-SCNC: 130 MMOL/L (ref 135–145)
WBC # BLD AUTO: 14.3 K/UL (ref 4.8–10.8)

## 2020-04-28 PROCEDURE — 85025 COMPLETE CBC W/AUTO DIFF WBC: CPT

## 2020-04-28 PROCEDURE — 700111 HCHG RX REV CODE 636 W/ 250 OVERRIDE (IP): Performed by: INTERNAL MEDICINE

## 2020-04-28 PROCEDURE — A9270 NON-COVERED ITEM OR SERVICE: HCPCS | Performed by: FAMILY MEDICINE

## 2020-04-28 PROCEDURE — 99233 SBSQ HOSP IP/OBS HIGH 50: CPT | Performed by: INTERNAL MEDICINE

## 2020-04-28 PROCEDURE — 700102 HCHG RX REV CODE 250 W/ 637 OVERRIDE(OP): Performed by: HOSPITALIST

## 2020-04-28 PROCEDURE — A9270 NON-COVERED ITEM OR SERVICE: HCPCS | Performed by: NURSE PRACTITIONER

## 2020-04-28 PROCEDURE — 99232 SBSQ HOSP IP/OBS MODERATE 35: CPT | Performed by: HOSPITALIST

## 2020-04-28 PROCEDURE — 700102 HCHG RX REV CODE 250 W/ 637 OVERRIDE(OP): Performed by: FAMILY MEDICINE

## 2020-04-28 PROCEDURE — A9270 NON-COVERED ITEM OR SERVICE: HCPCS | Performed by: HOSPITALIST

## 2020-04-28 PROCEDURE — 700105 HCHG RX REV CODE 258: Performed by: INTERNAL MEDICINE

## 2020-04-28 PROCEDURE — 36415 COLL VENOUS BLD VENIPUNCTURE: CPT

## 2020-04-28 PROCEDURE — 80053 COMPREHEN METABOLIC PANEL: CPT

## 2020-04-28 PROCEDURE — 770001 HCHG ROOM/CARE - MED/SURG/GYN PRIV*

## 2020-04-28 PROCEDURE — 700102 HCHG RX REV CODE 250 W/ 637 OVERRIDE(OP): Performed by: NURSE PRACTITIONER

## 2020-04-28 RX ORDER — LINEZOLID 600 MG/1
600 TABLET, FILM COATED ORAL EVERY 12 HOURS
Status: DISCONTINUED | OUTPATIENT
Start: 2020-04-28 | End: 2020-05-11

## 2020-04-28 RX ADMIN — ACETAMINOPHEN 650 MG: 325 TABLET, FILM COATED ORAL at 08:44

## 2020-04-28 RX ADMIN — OXYCODONE 10 MG: 5 TABLET ORAL at 11:18

## 2020-04-28 RX ADMIN — SERTRALINE HYDROCHLORIDE 50 MG: 50 TABLET ORAL at 04:43

## 2020-04-28 RX ADMIN — PIPERACILLIN AND TAZOBACTAM 4.5 G: 4; .5 INJECTION, POWDER, LYOPHILIZED, FOR SOLUTION INTRAVENOUS; PARENTERAL at 20:32

## 2020-04-28 RX ADMIN — LINEZOLID 600 MG: 600 TABLET, FILM COATED ORAL at 17:20

## 2020-04-28 RX ADMIN — DOCUSATE SODIUM 50 MG AND SENNOSIDES 8.6 MG 2 TABLET: 8.6; 5 TABLET, FILM COATED ORAL at 17:20

## 2020-04-28 RX ADMIN — OXYCODONE 10 MG: 5 TABLET ORAL at 20:50

## 2020-04-28 RX ADMIN — ATORVASTATIN CALCIUM 40 MG: 40 TABLET, FILM COATED ORAL at 04:43

## 2020-04-28 RX ADMIN — PIPERACILLIN AND TAZOBACTAM 4.5 G: 4; .5 INJECTION, POWDER, LYOPHILIZED, FOR SOLUTION INTRAVENOUS; PARENTERAL at 13:08

## 2020-04-28 RX ADMIN — OXYCODONE 10 MG: 5 TABLET ORAL at 01:46

## 2020-04-28 RX ADMIN — PIPERACILLIN AND TAZOBACTAM 4.5 G: 4; .5 INJECTION, POWDER, LYOPHILIZED, FOR SOLUTION INTRAVENOUS; PARENTERAL at 04:46

## 2020-04-28 RX ADMIN — OXYCODONE 10 MG: 5 TABLET ORAL at 16:35

## 2020-04-28 RX ADMIN — FERROUS GLUCONATE 324 MG: 324 TABLET ORAL at 08:43

## 2020-04-28 RX ADMIN — OXYCODONE 10 MG: 5 TABLET ORAL at 06:19

## 2020-04-28 RX ADMIN — LINEZOLID 600 MG: 600 TABLET, FILM COATED ORAL at 04:44

## 2020-04-28 ASSESSMENT — ENCOUNTER SYMPTOMS
CHILLS: 0
NAUSEA: 0
SPUTUM PRODUCTION: 0
PALPITATIONS: 0
DIARRHEA: 0
EYE PAIN: 0
CONSTIPATION: 0
BACK PAIN: 0
DIZZINESS: 0
WEIGHT LOSS: 0
VOMITING: 0
CLAUDICATION: 0
DOUBLE VISION: 0
TINGLING: 0
PHOTOPHOBIA: 0
FEVER: 0
SHORTNESS OF BREATH: 0
ABDOMINAL PAIN: 0
HEADACHES: 0
MYALGIAS: 1
HEMOPTYSIS: 0
NECK PAIN: 0
MYALGIAS: 0
FOCAL WEAKNESS: 0

## 2020-04-28 NOTE — THERAPY
PT treatment attempted; RN requesting hold as LE wound actively bleeding at rest. Will re-attempt as able.

## 2020-04-28 NOTE — OR NURSING
2208 Pt arrived to PACU. Report received from OR RN and Dr. Santos, anesthesiologist.    Pt AA/Ox4. VSS. Dressing to LLE, CDI. Pt reported initially reported 8/10 pain scale. Pain medications given with good relief. Pt reports 4/10 pain scale at this time.     2230 Wound vac machine started beeping and saying that there is blockage in the tubing. RN checked tubing and noted clots to the tubing.     2240 New wound vac cannister replaced.     2355 Machine still continues to beep and says blockage to tubing.  Two RNs checked the machine and tubing and found out that there was blood pooling to the dressing site on Pt's left leg.     2310 Paged Dr. Henson, on-call MD for [facility] group. Awaiting call back from MD.

## 2020-04-28 NOTE — ANESTHESIA POSTPROCEDURE EVALUATION
Patient: Goran Chavez    Procedure Summary     Date:  04/27/20 Room / Location:  [facility] 12 / SURGERY [facility]    Anesthesia Start:  2107 Anesthesia Stop:  2219    Procedure:  IRRIGATION AND DEBRIDEMENT, WOUND - LEG (Left Leg Lower) Diagnosis:  (Lef Lower Extremity Infection)    Surgeon:  Pasquale John M.D. Responsible Provider:  Lisa Santos M.D.    Anesthesia Type:  general ASA Status:  3 - Emergent          Final Anesthesia Type: general  Last vitals  BP   Blood Pressure: (!) 178/105    Temp   37.6 °C (99.6 °F)    Pulse   Pulse: 72   Resp   18    SpO2   93 %      Anesthesia Post Evaluation    Patient location during evaluation: PACU  Patient participation: complete - patient participated  Level of consciousness: awake and alert    Airway patency: patent  Anesthetic complications: no  Cardiovascular status: hemodynamically stable  Respiratory status: acceptable  Hydration status: euvolemic    PONV: none           Nurse Pain Score: 4 (NPRS)    HTN in preop as well 180s/100s; will tx to keep SBP below 170

## 2020-04-28 NOTE — PROGRESS NOTES
Ortho Progress    Had some bleeding overnight and wound vac was D/Cd.      Wound bed with minimal bleeding but some spot bleeding around skin edge, no arterial or pulsatile bleeding.      A/P POD 1 LLE I&D  Will place some hemostatic bandages and a pressure dressing, LPS will reassess in a day or two to see if wound vac can be placed back onto wound    Sometime next week, possibly Monday we anticipate doing skin grafts over the defect

## 2020-04-28 NOTE — ANESTHESIA PREPROCEDURE EVALUATION
65yo with osteo/Charcot foot; here for repeat I & D    TTE 12/19: EF 55%, RVSP 39, mild AS, TR, MR    Relevant Problems   NEURO   (+) History of prediabetes      CARDIAC   (+) Chronic migraine without aura without status migrainosus, not intractable   (+) Essential hypertension   (+) Pulmonary hypertension (HCC)      GI   (+) Gastroesophageal reflux disease with esophagitis         (+) CKD (chronic kidney disease) stage 3, GFR 30-59 ml/min (HCC)   (+) Chronic hepatitis C without hepatic coma (HCC)      Other   (+) Cellulitis of left lower extremity w tunneling abscess   (+) Charcot's joint of left foot, non-diabetic   (+) Left ventricular hypertrophy   (+) Methamphetamine abuse (HCC) (UDS positive on admission, indicating use in past week)   (+) Peripheral polyneuropathy       Physical Exam    Airway   Mallampati: III  TM distance: >3 FB  Neck ROM: full       Cardiovascular - normal exam  Rhythm: regular  Rate: normal  (-) murmur     Dental       Very poor dentition   Pulmonary - normal exam  Breath sounds clear to auscultation     Abdominal    Neurological - normal exam               Anesthesia Plan    ASA 3- EMERGENT   ASA physical status 3 criteria: alcohol and/or substance dependence or abuse, moderate reduction of ejection fraction and hypertension - poorly controlled    Plan - general       Airway plan will be LMA        Induction: intravenous    Postoperative Plan: Postoperative administration of opioids is intended.    Pertinent diagnostic labs and testing reviewed    Informed Consent:    Anesthetic plan and risks discussed with patient.    Use of blood products discussed with: patient whom consented to blood products.

## 2020-04-28 NOTE — CARE PLAN
Problem: Safety  Goal: Will remain free from injury  Outcome: PROGRESSING AS EXPECTED  Goal: Will remain free from falls  Outcome: PROGRESSING AS EXPECTED     Problem: Skin Integrity  Goal: Risk for impaired skin integrity will decrease  Outcome: PROGRESSING SLOWER THAN EXPECTED  Note: LLE dressing saturated this morning. Ortho paged. Wound care RNs to the bedside. Dressing- clean, dry and intact.

## 2020-04-28 NOTE — OR NURSING
Report given to KEN Castillo at [address] floor.    Pt's daughter, Megan, updated regarding plan of care.    Pt via bed, accompanied by transport, was transferred to [facility] at 0018.

## 2020-04-28 NOTE — ANESTHESIA QCDR
2019 [facility] Clinical Data Registry (for Quality Improvement)     Postoperative nausea/vomiting risk protocol (Adult = 18 yrs and Pediatric 3-17 yrs)- (430 and 463)  General inhalation anesthetic (NOT TIVA) with PONV risk factors: Yes  Provision of anti-emetic therapy with at least 2 different classes of agents: Yes   Patient DID NOT receive anti-emetic therapy and reason is documented in Medical Record:  N/A    Multimodal Pain Management- (477)  Non-emergent surgery AND patient age >= 18: No  Use of Multimodal Pain Management, two or more drugs and/or interventions, NOT including systemic opioids:   Exception: Documented allergy to multiple classes of analgesics:     Smoking Abstinence (404)  Patient is current smoker (cigarette, pipe, e-cig, marijuanna): No  Elective Surgery:   Abstinence instructions provided prior to day of surgery:   Patient abstained from smoking on day of surgery:     Pre-Op Beta-Blocker in Isolated CABG (44)  Isolated CABG AND patient age >= 18: No  Beta-blocker admin within 24 hours of surgical incision:   Exception:of medical reason(s) for not administering beta blocker within 24 hours prior to surgical incision (e.g., not  indicated,other medical reason):     PACU assessment of acute postoperative pain prior to Anesthesia Care End- Applies to Patients Age = 18- (ABG7)  Initial PACU pain score is which of the following: < 7/10  Patient unable to report pain score: N/A    Post-anesthetic transfer of care checklist/protocol to PACU/ICU- (426 and 427)  Upon conclusion of case, patient transferred to which of the following locations: PACU/Non-ICU  Use of transfer checklist/protocol: Yes  Exclusion: Service Performed in Patient Hospital Room (and thus did not require transfer): N/A  Unplanned admission to ICU related to anesthesia service up through end of PACU care- (MD51)  Unplanned admission to ICU (not initially anticipated at anesthesia start time): No

## 2020-04-28 NOTE — OR NURSING
Dr. Swanson called back with new orders.    MD ordered not to remove wound vac dressing. LLE to be reinforced with ABD dressings and do an overlap dressing with ace wrap.     Pt okay to go back to his room per MD. MD to check on Pt tomorrow.

## 2020-04-28 NOTE — PROGRESS NOTES
Receive bedside report from night RN. Assumed patient care at 0700. Patient is asleep in bed. No visible signs of distress or discomfort. Left leg dressing starting to be become saturated with blood. Updated whiteboard. Hourly rounding in place.

## 2020-04-28 NOTE — PROGRESS NOTES
Infectious Disease Progress Note    Author: Karen Hemphill M.D. Date & Time of service: 2020  8:58 AM    Chief Complaint:  Follow-up for LLE infection    Interval History:   afebrile, white count 10.2, tolerating Zyvox and Zosyn.  Reports feeling better overall.   afebrile, white count 7.6, tolerating antibiotics with no new issues.   afebrile WBC 8.3 plan for repeat I&D today, denies pain, no new clinical issues   AF WBC 14.3 S/p repeat I&D yesterday. Wound vac removed given excessive bloody drainage. Rate pain 6-7/10 in severity.    Labs Reviewed and Medications Reviewed.    Review of Systems:  Review of Systems   Constitutional: Negative for chills, fever and weight loss.   Respiratory: Negative for shortness of breath.    Gastrointestinal: Negative for abdominal pain, constipation, diarrhea, nausea and vomiting.   Genitourinary: Negative for dysuria.   Musculoskeletal: Positive for myalgias.   Skin: Negative for itching and rash.   Neurological: Negative for dizziness, focal weakness and headaches.   All other systems reviewed and are negative.      Hemodynamics:  Temp (24hrs), Av.6 °C (97.8 °F), Min:36.2 °C (97.1 °F), Max:37.6 °C (99.6 °F)  Temperature: 36.5 °C (97.7 °F)  Pulse  Av.9  Min: 54  Max: 127   Blood Pressure: 117/79       Physical Exam:  Physical Exam  Vitals signs and nursing note reviewed.   Constitutional:       General: He is not in acute distress.     Appearance: He is not ill-appearing.   HENT:      Head: Normocephalic and atraumatic.      Right Ear: External ear normal.      Left Ear: External ear normal.      Mouth/Throat:      Mouth: Mucous membranes are moist.      Pharynx: No oropharyngeal exudate.      Comments: Poor dentition  Eyes:      General: No scleral icterus.     Conjunctiva/sclera: Conjunctivae normal.      Pupils: Pupils are equal, round, and reactive to light.   Neck:      Musculoskeletal: Neck supple.   Cardiovascular:      Rate and Rhythm:  Normal rate and regular rhythm.      Pulses: Normal pulses.      Heart sounds: Normal heart sounds. No murmur.   Pulmonary:      Effort: Pulmonary effort is normal. No respiratory distress.      Breath sounds: No wheezing.   Abdominal:      General: There is no distension.      Palpations: Abdomen is soft.      Tenderness: There is no abdominal tenderness.   Musculoskeletal: Normal range of motion.         General: Swelling present.      Comments: Left lower extremity with dressings in place soaked in bloody drainage   Lymphadenopathy:      Cervical: No cervical adenopathy.   Skin:     General: Skin is warm.      Findings: No erythema or rash.   Neurological:      General: No focal deficit present.      Mental Status: He is alert and oriented to person, place, and time.   Psychiatric:         Mood and Affect: Mood normal.         Behavior: Behavior normal.      Comments: Pleasant         Meds:    Current Facility-Administered Medications:   •  senna-docusate  •  sertraline  •  polyethylene glycol/lytes  •  magnesium hydroxide  •  [COMPLETED] piperacillin-tazobactam **AND** piperacillin-tazobactam  •  atorvastatin  •  ferrous gluconate  •  acetaminophen  •  ondansetron  •  ondansetron  •  promethazine  •  promethazine  •  prochlorperazine  •  hydrALAZINE  •  oxyCODONE immediate-release  •  morphine injection    Labs:  Recent Labs     04/26/20 0057 04/27/20 0244 04/28/20  0254   WBC 7.6 8.3 14.3*   RBC 3.05* 3.16* 3.19*   HEMOGLOBIN 9.4* 9.7* 9.9*   HEMATOCRIT 30.2* 30.5* 30.9*   MCV 99.0* 96.5 96.9   MCH 30.8 30.7 31.0   RDW 57.6* 55.6* 54.6*   PLATELETCT 328 334 334   MPV 8.4* 8.1* 8.0*   NEUTSPOLYS 74.80* 77.40* 95.70*   LYMPHOCYTES 15.10* 13.10* 2.20*   MONOCYTES 6.30 6.50 1.10   EOSINOPHILS 2.60 2.10 0.00   BASOPHILS 0.50 0.50 0.40     Recent Labs     04/26/20 0057 04/27/20  0244 04/28/20  0254   SODIUM 139 136 130*   POTASSIUM 5.5 5.0 5.3   CHLORIDE 105 103 98   CO2 24 24 21   GLUCOSE 97 95 170*   BUN 23* 21  20     Recent Labs     04/26/20  0057 04/27/20  0244 04/28/20  0254   ALBUMIN 2.3* 2.4* 2.7*   TBILIRUBIN 0.2 0.3 0.4   ALKPHOSPHAT 81 80 80   TOTPROTEIN 6.7 6.8 6.8   ALTSGPT 9 6 6   ASTSGOT 18 16 13   CREATININE 1.28 1.22 1.25       Imaging:  Ct-extremity, Lower With Left    Result Date: 4/21/2020 4/21/2020 5:16 PM HISTORY/REASON FOR EXAM:  Lower leg pain, suspected osteomyelitis. TECHNIQUE/EXAM DESCRIPTION AND NUMBER OF VIEWS:  CT scan of the LEFT lower extremity with contrast, with reconstructions. Thin helical 3 mm sections were obtained from the distal femur through the proximal tibia/fibula. Sagittal and coronal multiplanar reconstructions were generated from the axial images. A total of 100 mL of Omnipaque 350 nonionic contrast was administered  IV without complication. Up to date radiation dose reduction adjustments have been utilized to meet ALARA standards for radiation dose reduction. COMPARISON: 4/16/2020. FINDINGS: No acute fracture or dislocation is identified. Cellulitis of the lower leg, with soft tissue prominence and subcutaneous air. No subjacent cortical bone destruction to suggest osteomyelitis. No drainable abscess. There is chronic appearing valgus deviation of the foot at the subtalar joint, and there is suspected pes planovalgus. Moderate subtalar osteoarthrosis. Multifocal mild to moderate osteoarthrosis in the midfoot. Mild multifocal interphalangeal osteoarthrosis. Reactive popliteal lymphadenopathy.     1. No evidence of osteomyelitis. 2. Cellulitis and soft tissue organs of the left lower leg. Subcutaneous air without drainable abscess. 3. Suspected pes planovalgus deformity. Multifocal osteoarthrosis of the midfoot.    Ct-extremity, Lower With Left    Result Date: 4/16/2020 4/16/2020 1:45 PM HISTORY/REASON FOR EXAM:  Lower leg swelling/redness, cellulitis suspected. Left lower extremity edema and redness TECHNIQUE/EXAM DESCRIPTION AND NUMBER OF VIEWS:  CT scan of the LEFT lower  extremity with contrast, with reconstructions. Thin helical 3 mm sections were obtained from the distal femur through the proximal tibia/fibula. Sagittal and coronal multiplanar reconstructions were generated from the axial images. A total of 100 mL of Omnipaque 350 nonionic contrast was administered  IV without complication. Up to date radiation dose reduction adjustments have been utilized to meet ALARA standards for radiation dose reduction. COMPARISON: 12/26/2019 FINDINGS: There is diffuse subcutaneous edema in the lower leg. The overlying skin is markedly thickened. There is curvilinear fluid deep to the fascia, but superficial to the gastrocnemius muscle extending from the knee joint to the ankle. Edema extends into the deeper fascial planes. No soft tissue gas is identified. No definite muscle necrosis is identified. No thrombus is identified. The Achilles tendon is intact. The bones are osteopenic. No cortical destruction is identified.     1.  Extensive subcutaneous edema with overlying skin thickening. Inflammation extends along the fascial planes with curvilinear fluid superficial to the gastrocnemius muscle. No soft tissue gas to suggest necrotizing fasciitis. 2.  Osteopenia    Us-renal    Result Date: 4/19/2020 4/19/2020 10:53 AM HISTORY/REASON FOR EXAM:  Acute renal failure TECHNIQUE/EXAM DESCRIPTION: Renal ultrasound. COMPARISON:  08/15/2018 FINDINGS: The right kidney measures 8.11 cm. The left kidney measures 9.17 cm. There is no hydronephrosis. There are no abnormal calcifications. Small right pleural effusion is identified. The bladder demonstrates no focal wall abnormality.     1.  Right kidney appears smaller than left kidney which could be due to atrophy. 2.  No hydronephrosis. 3.  Incidentally noted small right pleural effusion.      Micro:  Results     Procedure Component Value Units Date/Time    CULTURE WOUND W/ GRAM STAIN [[id number]]  (Abnormal)  (Susceptibility) Collected:  04/22/20 1203       Order Status:  Completed Specimen:  Wound Updated:  04/25/20 1510     Significant Indicator POS     Source WND     Site Left Leg     Culture Result -     Gram Stain Result Moderate WBCs.  No organisms seen.       Culture Result Pseudomonas aeruginosa  Light growth  P.aeruginosa may develop resistance during prolonged therapy  with all antibiotics. Isolates that are initially susceptible  may become resistant within three to four days after  initiation of therapy. Testing of repeat isolates may be  warranted.        Beta Hemolytic Streptococcus group A  Rare growth      Narrative:       CALL  Rivas  [facility] tel. [phone],  CALLED  [facility] tel. [phone] 04/24/2020, 11:06, RB PERF. RESULTS CALLED  TO:[phone]  Surgery - swabs received    Susceptibility     Pseudomonas aeruginosa (1)     Antibiotic Interpretation Microscan Method Status    Ceftazidime Sensitive <=1 mcg/mL SHANON Final    Ciprofloxacin Resistant >2 mcg/mL SHANON Final    Cefepime Sensitive 4 mcg/mL SHANON Final    Amikacin Sensitive <=16 mcg/mL SHANON Final    Gentamicin Sensitive <=4 mcg/mL SHANON Final    Tobramycin Sensitive <=4 mcg/mL SHANON Final    Meropenem Sensitive <=1 mcg/mL SHANON Final    Pip/Tazobactam Sensitive 64 mcg/mL SHANON Final                   Anaerobic Culture [[id number]] Collected:  04/22/20 1203    Order Status:  Completed Specimen:  Wound Updated:  04/25/20 1510     Significant Indicator NEG     Source WND     Site Left Leg     Culture Result No Anaerobes isolated.    Narrative:       CALL  Rivas  [facility] tel. [phone],  CALLED  [facility] tel. [phone] 04/24/2020, 11:06, RB PERF. RESULTS CALLED  TO:[phone]  Surgery - swabs received    GRAM STAIN [[id number]] Collected:  04/22/20 1203    Order Status:  Completed Specimen:  Wound Updated:  04/22/20 2059     Significant Indicator .     Source WND     Site Left Leg     Gram Stain Result Moderate WBCs.  No organisms seen.      Narrative:       Surgery - swabs received    BLOOD CULTURE [[id number]] Collected:  04/16/20  "1246    Order Status:  Completed Specimen:  Blood from Peripheral Updated:  04/21/20 1500     Significant Indicator NEG     Source BLD     Site PERIPHERAL     Culture Result No growth after 5 days of incubation.    Narrative:       Per Hospital Policy: Only change Specimen Src: to \"Line\" if  specified by physician order.  Left Forearm/Arm    BLOOD CULTURE [[id number]] Collected:  04/16/20 1140    Order Status:  Completed Specimen:  Blood from Peripheral Updated:  04/21/20 1300     Significant Indicator NEG     Source BLD     Site PERIPHERAL     Culture Result No growth after 5 days of incubation.    Narrative:       Per Hospital Policy: Only change Specimen Src: to \"Line\" if  specified by physician order.  No site indicated          Assessment:  64-year-old male with methamphetamine abuse, hepatitis C status post treatment, bilateral lower extremity venous stasis dermatitis with ulcerations, admitted with left leg cellulitis and fever    Pertinent Diagnoses  Left lower extremity cellulitis  Chronic venous stasis changes bilateral lower extremities  Methamphetamine abuse  Hepatitis C status post treatment    Plan:  -CT of the left lower extremity on 4/21 with no osteomyelitis or drainable abscess  -Patient underwent I&D on 4/22 down to muscle, returned to the OR on 4/24 for repeat I&D of multiple compartments and placement of wound VAC skin at the margins was noted to be necrotic  -S/p repeat I&D and [place] change on 4/27 by Dr. John  -Blood culture showed no growth to date  -Wound cultures on 4/22 positive for group A strep and MRSA  -Intraoperative wound cx showed pseudomonas aeruginosa and GAS  -Continue linezolid and IV zosyn for now  -Monitor CBC while on linezolid  -Continue wound care and recommend follow-up with outpatient wound clinic post discharge  -Plan for repeat I&D with possible skin graft next week per Ortho notes    I have performed a physical exam and reviewed and updated ROS and plan today 4/28/2020.  " In review of yesterday's note 4/27/2020, there are no changes except as documented above.        Discussed with internal medicine.  ID will follow.  Please call with questions.

## 2020-04-28 NOTE — THERAPY
"Occupational Therapy Treatment completed with focus on ADLs, ADL transfers and patient education.  Functional Status: Supine > EOB supv, transfers with FWW supv, LB dressing min A, toileting supv, standing grooming supv   Plan of Care: Will benefit from Occupational Therapy 3 times per week  Discharge Recommendations:  Equipment Will Continue to Assess for Equipment Needs.    See \"Rehab Therapy-Acute\" Patient Summary Report for complete documentation.     Pt seen for OT tx to include: bed mobility, transfers, standing grooming, toileting, LB dressing. Pt trained on use of reacher and sock-aid. Fair initial demo. Would benefit from further training. Pt stood at sink for 6 min for oral care. Had BM on toilet and able to complete hygiene. Pt would benefit from continued acute OT to maximize functional independence and safety. Anticipate pt can DC home with HH if family available to assist as needed.   "

## 2020-04-28 NOTE — PROGRESS NOTES
LIMB PRESERVATION SERVICE   POST SURGICAL PROGRESS NOTE      HPI:  64 y.o.  with a past medical history that includes hypertension, hepatitis C, kidney disease, polysubstance abuse, nondiabetic Charcot's deformity left foot, and chronic venous stasis ulcers bilateral lower extremities admitted 4/16/2020 for Cellulitis of left lower extremity.        LPS has been consulted for evaluation of left lower leg venous stasis ulcers.  The patient has a history of chronic venous stasis ulcers and has been seen in outpatient wound clinic for treatment as well as infectious disease in October 2019 for an infected skin ulcer.  He reports that his previous venostasis ulcers had resolved approximately 1 month ago and he was discharged from the outpatient wound clinic.  He states that 1 week ago (4/12/2020) he was in the shower and noticed that he developed an ulcer on the anterior aspect of his lower leg distal to his knee and then the ulcer had continued to worsen from there radiating to the back of his left lower leg and development of new ulcers down his left lateral lower leg to his ankle.  He reports having fever, chills, and a large amount of clear drainage from his ulcers.  He states that 4/15/2020 his left lower leg began to swell and developed redness.  He was seen in the outpatient wound center where he was found to have significant swelling and erythema to his left lower extremity, weeping from superficial tissue, and foul-smelling odor.    Patient proceeded to ED.  ESR 97, CRP 31.7.  He was started on vancomycin and Zosyn IV and admitted under hospital services.     SURGERY DATE: 04/22/20  PROCEDURE: with Dr. Israel  1.  Left leg irrigation and debridement, multiple compartments, deep down to   the level of muscle.  2.  Left fasciotomy anterior and posterior compartments.  3.  Left placement of wound VAC.    SURGERY DATE: 04/24/20  PROCEDURE: with Dr. Israel  1.  Left leg irrigation and debridement, multiple  compartments.  2.  Placement of wound VAC.    SURGERY DATE: 04/27/20  PROCEDURE: with Dr. John  1.  Left leg irrigation and debridement, multiple compartments.  2.  Placement of wound VAC.    4/22/2020: Patient denies fevers, chills, nausea, vomiting.  Complains of pain with inspection of lower leg ulcers.  Tract with purulent drainage noted yesterday by wound team.  Repeat CT ordered and was negative for abscess or osteomyelitis.  Patient has been n.p.o. since last night.  Dressings that were changed yesterday are completely saturated today.   Patient also has Charcot foot to left foot and complains of severe pain to foot when walking.  4/28/2020: Patient denies fevers, chills, nausea, vomiting.  Pain well controlled.   LPS was called by bedside RN to assess left LE.  Wound VAC placed last night, clotted off when patient arrived to floor, troubleshoot by night RN, unable to resolve.  NOC RN removed drape (not the foam) and the wound started bleeding.  NOC held pressure to wound bed, and applied pressure dressing.        PERTINENT LPS RESULTS:       SURGICAL SITE EXAM:      /79   Pulse 62   Temp 36.5 °C (97.7 °F) (Temporal)   Resp 18   Ht 1.829 m (6')   Wt 90.2 kg (198 lb 13.7 oz)   SpO2 97%   BMI 26.97 kg/m²     Pedal Pulses: palpable pulses  Sensation: LOPS  Foot warm to touch         Wound 04/16/20 Venous Ulcer Ankle Medial;Posterior Left -Left Medial/Posterior Ankle (Active)   Site Assessment Red 4/28/2020 10:00 AM   Periwound Assessment Excoriated 4/28/2020 10:00 AM   Margins Attached edges;Defined edges 4/28/2020 10:00 AM   Closure Secondary intention 4/28/2020 10:00 AM   Drainage Amount Moderate 4/28/2020 10:00 AM   Treatments Cleansed;Irrigation 4/28/2020 10:00 AM   Wound Cleansing Approved Wound Cleanser 4/28/2020 10:00 AM   Periwound Protectant Not Applicable 4/28/2020 10:00 AM   Dressing Cleansing/Solutions Not Applicable 4/22/2020  9:44 AM   Dressing Options Dry Gauze;Absorbent Abdominal  Pad;Soft Conforming Roll;Dry Roll Gauze;Coban 4/28/2020 10:00 AM   Dressing Changed New 4/28/2020 10:00 AM   Dressing Status Clean;Dry;Intact 4/28/2020 10:00 AM   Dressing Change/Treatment Frequency By Wound Team Only 4/28/2020 10:00 AM   NEXT Dressing Change/Treatment Date 04/24/20 4/23/2020  7:45 PM   NEXT Weekly Photo (Inpatient Only) 04/29/20 4/23/2020  7:45 PM   Non-staged Wound Description Not applicable 4/28/2020 10:00 AM   Wound Bed Slough % - (Post-Procedure) 100 % 4/21/2020  4:00 PM   Tunneling (cm) 0 cm 4/19/2020  2:15 PM   Undermining (cm) 0 cm 4/19/2020  2:15 PM   Shape Irregular 4/21/2020  4:00 PM   Wound Odor Strong;Foul 4/21/2020  4:00 PM   Pulses Doppler 4/19/2020  2:15 PM   Exposed Structures Muscle;Tendon;Adipose;Fascia 4/28/2020 10:00 AM   WOUND NURSE ONLY - Time Spent with Patient (mins) 60 4/21/2020  4:00 PM     Wound care: To be changed by wound team and LPS tomorrow to wound VAC       DIABETES MANAGEMENT:    Blood glucose:     A1c:   Lab Results   Component Value Date/Time    HBA1C 5.7 (H) 01/23/2020 11:22 AM            INFECTION MANAGEMENT:    Results from last 7 days   Lab Units 04/28/20  0254 04/27/20  0244 04/26/20  0057 04/25/20  0043 04/24/20  0222 04/23/20  0033   WBC 1501 K/uL 14.3* 8.3 7.6 10.2 9.5 11.3*   PLATELET COUNT 1518 K/uL 334 334 328 339 347 343     Wound culture results:   Results     Procedure Component Value Units Date/Time    CULTURE WOUND W/ GRAM STAIN [[id number]]  (Abnormal)  (Susceptibility) Collected:  04/22/20 1203    Order Status:  Completed Specimen:  Wound Updated:  04/25/20 1510     Significant Indicator POS     Source WND     Site Left Leg     Culture Result -     Gram Stain Result Moderate WBCs.  No organisms seen.       Culture Result Pseudomonas aeruginosa  Light growth  P.aeruginosa may develop resistance during prolonged therapy  with all antibiotics. Isolates that are initially susceptible  may become resistant within three to four days after  initiation  "of therapy. Testing of repeat isolates may be  warranted.        Beta Hemolytic Streptococcus group A  Rare growth      Narrative:       CALL  Rivas  [facility] tel. [phone],  CALLED  [facility] tel. [phone] 04/24/2020, 11:06, RB PERF. RESULTS CALLED  TO:[phone]  Surgery - swabs received    Susceptibility     Pseudomonas aeruginosa (1)     Antibiotic Interpretation Microscan Method Status    Ceftazidime Sensitive <=1 mcg/mL SHANON Final    Ciprofloxacin Resistant >2 mcg/mL SHANON Final    Cefepime Sensitive 4 mcg/mL SHANON Final    Amikacin Sensitive <=16 mcg/mL SHANON Final    Gentamicin Sensitive <=4 mcg/mL SHANON Final    Tobramycin Sensitive <=4 mcg/mL SHANON Final    Meropenem Sensitive <=1 mcg/mL SHANON Final    Pip/Tazobactam Sensitive 64 mcg/mL SHANON Final                   Anaerobic Culture [[id number]] Collected:  04/22/20 1203    Order Status:  Completed Specimen:  Wound Updated:  04/25/20 1510     Significant Indicator NEG     Source WND     Site Left Leg     Culture Result No Anaerobes isolated.    Narrative:       CALL  Rivas  [facility] tel. [phone],  CALLED  [facility] tel. [phone] 04/24/2020, 11:06, RB PERF. RESULTS CALLED  TO:[phone]  Surgery - swabs received    GRAM STAIN [[id number]] Collected:  04/22/20 1203    Order Status:  Completed Specimen:  Wound Updated:  04/22/20 2059     Significant Indicator .     Source WND     Site Left Leg     Gram Stain Result Moderate WBCs.  No organisms seen.      Narrative:       Surgery - swabs received    BLOOD CULTURE [[id number]] Collected:  04/16/20 1246    Order Status:  Completed Specimen:  Blood from Peripheral Updated:  04/21/20 1500     Significant Indicator NEG     Source BLD     Site PERIPHERAL     Culture Result No growth after 5 days of incubation.    Narrative:       Per Hospital Policy: Only change Specimen Src: to \"Line\" if  specified by physician order.  Left Forearm/Arm               ASSESSMENT/PLAN:   Patient had I&D of Left LE wound with fasciotomy with wound VAC application performed " on 4/22, 4/24, 4/27.  Patient had wound VAC clotted 4/28 which lead to NOC RN to take off wound vac dressing.  The wound started bleeding which the NOC RN put pressure on and place a pressure dressing on.      Surgifoam placed in wound bed, gauze, ABD, soft roll, rolled gauze and coban placed by LPS wound RN.  Will try to place wound VAC tomorrow if the bleeding has stopped.      STSG planned for Monday 5/4/20      Wound care:   - wound care orders updated for nursing    Vascular status:   - Palpable pulses    Antibiotics:   - Per ID recommendations    Weight Bearing Status:   -Weight bearing as tolerated    Offloading:   -offload with pillows    PT/OT :   -involved, last seen 4/28    Diabetes Education:   -involved    - Implications of loss of protective sensation (LOPS) discussed with patient- including increased risk for amputation.  Advised to check feet at least daily, moisturize feet, and to always wear protective foot wear.   -avoid trimming own nails. See podiatrist or certified foot and nail RN  -keep blood sugars <150 for improved wound healing      Plan to return to O.R.:   Possible STSG on Monday 5/4/20      DISCHARGE PLAN:    Disposition: pending    Follow-up: pending      Discussed with: pt, RN, Dr. Dora Valdes, R.N.    If any questions or concerns, please call [phone]

## 2020-04-28 NOTE — PROGRESS NOTES
"Patient arrived to floor at 0038 from PACU. Patient alert and stating pain at level of 8/10 also requesting something to eat and drink. RN and Charge RN completed skin check. RN noted significant amount of bright red bloody drainage on dressing and bed sheets. Wound vac not running appropriately stating \"therapy interrupted\"      0049 Dr. Thorne notified about increase in drainage. Per Dr. Thorne call Surgeon.     0100 Dr. Henson notified (on call for Dr. John who completed surgery)    0117: Dr. Henson re-paged.      0131: Dr. Henson re-paged.    0140: per Dr. Henson okay to remove wound vac. Apply wet to dry dressing with multiple ABD pads, kerlix and ace wrap. 10 mg of oxycodone given per order for pain 8/10.     0150: while removing wound vac, per Dr. Henson, blood squirted from wound site. Wound was instantly covered with ABD pads and Kerlix and pressure applied. Dr. Hinds notified and arrived to floor. Multiple RN and CNA helped hold pressure to site. Dr. Henson again notified about the increased bleeding to site and the squirting of blood. At this time patient denies and numbness, tingling, lightheaded, blurred vision. Patient vitals remained stable. Per Dr. Henson, hold pressure to site. Wrap site again with multiple ABD pads and kerlix and continue to monitor.     0232: patient complained of dizziness and lightheadedness. Vitals stable.     0256: Vitals: Temp 97.5, HR 70, RR 18, /70, oxygen 99% on 3 liters nasal cannula. Patient remains asymptomatic. RN to continue to monitor vitals and patient frequently.    "

## 2020-04-28 NOTE — PROGRESS NOTES
4/27/2020    Ortho note    Goran Chavez is a 64 y.o. returning to OR today for repeat washout of wounds    Past Medical History:   Diagnosis Date   • Anxiety    • Charcot's joint of left foot, non-diabetic 3/21/2016   • Hepatitis C, chronic (HCC)    • Hypertension    • Kidney disease    • Migraine    • Polysubstance abuse (HCC) 3/8/2018   • Tobacco use 4/18/2016   • Ulcer of left lower extremity with necrosis of muscle (HCC) 3/21/2016   • Venous stasis ulcer (HCC) 2017    bilateral lower extremity       Past Surgical History:   Procedure Laterality Date   • IRRIGATION & DEBRIDEMENT ORTHO Left 4/24/2020    Procedure: IRRIGATION AND DEBRIDEMENT, WOUND-FOOT;  Surgeon: Barrie Israel M.D.;  Location: SURGERY [facility];  Service: Orthopedics   • IRRIGATION & DEBRIDEMENT ORTHO Left 4/22/2020    Procedure: IRRIGATION AND DEBRIDEMENT, WOUND - LEG;  Surgeon: Barrie Israel M.D.;  Location: SURGERY [facility];  Service: Orthopedics   • TIBIA ORIF Right 1997   • SHOULDER ORIF Left 1997   • HAND SURGERY Left     due to infection   • PB DRAIN SKIN ABSCESS SIMPLE Left     leg, near the knee, remote       Medications  No current facility-administered medications on file prior to encounter.      Current Outpatient Medications on File Prior to Encounter   Medication Sig Dispense Refill   • acetaminophen (TYLENOL) 500 MG Tab Take 1,000 mg by mouth 1 time daily as needed for Moderate Pain.     • sertraline (ZOLOFT) 100 MG Tab Take 1 Tab by mouth every day. 30 Tab 3   • ferrous gluconate (FERGON) 324 (38 Fe) MG Tab Take 1 Tab by mouth every morning with breakfast. 30 Tab 5   • ascorbic acid (VITAMIN C) 500 MG tablet Take 1 Tab by mouth every day. 30 Tab 5   • amLODIPine (NORVASC) 10 MG Tab Take 1 Tab by mouth every day. 30 Tab 5   • atorvastatin (LIPITOR) 40 MG Tab Take 1 Tab by mouth every day. 30 Tab 11       Allergies  Norco [hydrocodone-acetaminophen]    ROS  . All other systems were reviewed and found to be  negative    Family History   Problem Relation Age of Onset   • Lung Disease Mother 70         from COPD   • Hypertension Mother    • Other Father 82         from brain aneurysm after fall   • Cancer Neg Hx    • Heart Disease Neg Hx    • Stroke Neg Hx    • Diabetes Neg Hx        Social History     Socioeconomic History   • Marital status: Legally      Spouse name: Not on file   • Number of children: Not on file   • Years of education: Not on file   • Highest education level: Not on file   Occupational History   • Not on file   Social Needs   • Financial resource strain: Not hard at all   • Food insecurity     Worry: Never true     Inability: Never true   • Transportation needs     Medical: No     Non-medical: No   Tobacco Use   • Smoking status: Former Smoker     Packs/day: 0.00     Years: 48.00     Pack years: 0.00     Types: Cigarettes     Last attempt to quit: 2018     Years since quittin.3   • Smokeless tobacco: Never Used   • Tobacco comment: states he is using 7mg nicotine patch   Substance and Sexual Activity   • Alcohol use: No     Alcohol/week: 7.2 oz     Types: 12 Cans of beer per week     Comment: history of alcoholism    • Drug use: Yes     Types: Marijuana, Inhaled, Methamphetamines     Comment: MJ every day, intermittent meth use   • Sexual activity: Not on file   Lifestyle   • Physical activity     Days per week: Not on file     Minutes per session: Not on file   • Stress: Not on file   Relationships   • Social connections     Talks on phone: Not on file     Gets together: Not on file     Attends Sabianism service: Not on file     Active member of club or organization: Not on file     Attends meetings of clubs or organizations: Not on file     Relationship status: Not on file   • Intimate partner violence     Fear of current or ex partner: Not on file     Emotionally abused: Not on file     Physically abused: Not on file     Forced sexual activity: Not on file   Other Topics  Concern   • Not on file   Social History Narrative   • Not on file       Physical Exam  Vitals  BP (!) 178/105   Pulse 72   Temp 37.6 °C (99.6 °F) (Temporal)   Resp 18   Ht 1.829 m (6')   Wt 90.2 kg (198 lb 13.7 oz)   SpO2 93%   General: Well Developed, Well Nourished, no acute distress  HEENT: Normocephalic, atraumatic  Eyes: Anicteric, PERRLA, EOMI  Neck: Supple, nontender, no masses  Lungs: CTA, no wheezes or crackles  Heart: RRR, no murmurs, rubs or gallops  Abdomen: Soft, NT, ND  Pelvis: Stable to AP and Lateral Compression  Skin: wounds  Extremities: bandages in place    Radiographs:  CT-EXTREMITY, LOWER WITH LEFT   Final Result         1. No evidence of osteomyelitis.   2. Cellulitis and soft tissue organs of the left lower leg. Subcutaneous air without drainable abscess.   3. Suspected pes planovalgus deformity. Multifocal osteoarthrosis of the midfoot.      US-RENAL   Final Result      1.  Right kidney appears smaller than left kidney which could be due to atrophy.      2.  No hydronephrosis.      3.  Incidentally noted small right pleural effusion.      CT-EXTREMITY, LOWER WITH LEFT   Final Result      1.  Extensive subcutaneous edema with overlying skin thickening. Inflammation extends along the fascial planes with curvilinear fluid superficial to the gastrocnemius muscle. No soft tissue gas to suggest necrotizing fasciitis.      2.  Osteopenia          Laboratory Values  Recent Labs     04/25/20 0043 04/26/20 0057 04/27/20  0244   WBC 10.2 7.6 8.3   RBC 3.09* 3.05* 3.16*   HEMOGLOBIN 9.5* 9.4* 9.7*   HEMATOCRIT 30.5* 30.2* 30.5*   MCV 98.7* 99.0* 96.5   MCH 30.7 30.8 30.7   MCHC 31.1* 31.1* 31.8*   RDW 56.4* 57.6* 55.6*   PLATELETCT 339 328 334   MPV 8.4* 8.4* 8.1*     Recent Labs     04/25/20  0043 04/26/20 0057 04/27/20  0244   SODIUM 136 139 136   POTASSIUM 5.0 5.5 5.0   CHLORIDE 103 105 103   CO2 22 24 24   GLUCOSE 109* 97 95   BUN 26* 23* 21             Impression:LLE  infection    Plan:    Operative intervention. Risks and benefits of surgery were discussed which include but are not limited to bleeding, infection, neurovascular damage, malunion, nonunion, instability, limb length discrepancy, DVT, PE, MI, Stroke and death. They understand these risks and wish to proceed.

## 2020-04-28 NOTE — PROGRESS NOTES
"[facility] Medicine Daily Progress Note    Date of Service  4/28/2020    Chief Complaint  64 y.o. male admitted 4/16/2020 with Left leg redness and swelling.    Hospital Course    64 y.o. male who presented 4/16/2020 with left leg redness and swelling.  Patient has history of venous stasis dermatitis of both legs, and venous stasis ulcers of the left leg.  Patient states that he was discharged from wound care a month ago and said the ulcers had resolved.  But then a week ago he noted that ulcers came back he also noticed increasing redness and swelling and pain.  He denied having any fever or chills, cough or congestion, shortness of breath, abdominal pain, nausea or vomiting, or diarrhea.  On review of his previous wound cultures, he has history of MRSA, Pseudomonas, Serratia, and group B streptococcus.    Cultures on this admissioon grew MRSA and GAS. ID has been following his care. He was eventually transitioned to oral Zyvox. He was improving. His WBCs normalized, he remained afebrile, and he was looking stable for discharge home with antibiotics and wound care. However, after wound care changed the dressing 4/21, mian pus was noted from a tunneling lesion, suggesting need for further I&D.  Patient was taken to the operating room by Dr. Israel.  Initial reports from healthcare personnel suggest there was some component of \"necrotizing fat\".  Antibiotics were broadened slightly to include Unasyn.  The case was discussed with infectious disease who will be following closely.    Patient's undergone multiple washout procedures.  Cultures from deep tissue in the operating room have grown Pseudomonas and GAS.  Antibiotics have been extended accordingly and he is receiving Zyvox and Zosyn.    Interval Problem Update  Patient is resting in bed, he is alert oriented follows commands, no fever no chills, wound VAC has been removed since he was having bleeding, bandage in place no signs of active bleeding at this time, " blood pressure is stable, denies any dizziness lightheadedness chest pain shortness of breath.    Consultants/Specialty  ID  LPS  Ortho    Code Status  full    Disposition  To be determined    Review of Systems  Review of Systems   Constitutional: Negative for chills and weight loss.   HENT: Negative for ear discharge, ear pain and nosebleeds.    Eyes: Negative for double vision, photophobia and pain.   Respiratory: Negative for hemoptysis, sputum production and shortness of breath.    Cardiovascular: Negative for chest pain, palpitations and claudication.   Gastrointestinal: Negative for abdominal pain, diarrhea, nausea and vomiting.   Genitourinary: Negative for frequency and urgency.   Musculoskeletal: Negative for back pain, myalgias and neck pain.        Left leg pain   Skin: Negative for itching.   Neurological: Negative for dizziness, tingling and headaches.        Physical Exam  Temp:  [36.2 °C (97.1 °F)-37.6 °C (99.6 °F)] 36.5 °C (97.7 °F)  Pulse:  [] 62  Resp:  [13-20] 18  BP: (103-185)/() 117/79  SpO2:  [93 %-99 %] 97 %    Physical Exam  Vitals signs and nursing note reviewed.   Constitutional:       Interventions: Face mask in place.   HENT:      Head: Normocephalic and atraumatic.      Nose: Nose normal.   Eyes:      General:         Right eye: No discharge.         Left eye: No discharge.      Conjunctiva/sclera: Conjunctivae normal.      Pupils: Pupils are equal, round, and reactive to light.   Neck:      Musculoskeletal: Neck supple.   Cardiovascular:      Rate and Rhythm: Normal rate and regular rhythm.      Heart sounds: Murmur present. Systolic murmur present with a grade of 1/6. No friction rub. No S3 or S4 sounds.    Pulmonary:      Effort: No respiratory distress.      Breath sounds: No wheezing.   Abdominal:      General: Bowel sounds are normal. There is no distension.      Palpations: Abdomen is soft.      Tenderness: There is no abdominal tenderness. There is no guarding.    Musculoskeletal:      Right lower leg: No edema.      Left lower leg: No edema.      Comments: Left lower extremity with bandage in place.   Skin:     General: Skin is warm and dry.   Neurological:      General: No focal deficit present.      Mental Status: He is alert and oriented to person, place, and time.      Cranial Nerves: No cranial nerve deficit.      Motor: No weakness.   Psychiatric:         Mood and Affect: Mood normal.         Behavior: Behavior normal.         Fluids    Intake/Output Summary (Last 24 hours) at 4/28/2020 1352  Last data filed at 4/27/2020 2219  Gross per 24 hour   Intake 500 ml   Output 715 ml   Net -215 ml       Laboratory  Recent Labs     04/26/20  0057 04/27/20  0244 04/28/20  0254   WBC 7.6 8.3 14.3*   RBC 3.05* 3.16* 3.19*   HEMOGLOBIN 9.4* 9.7* 9.9*   HEMATOCRIT 30.2* 30.5* 30.9*   MCV 99.0* 96.5 96.9   MCH 30.8 30.7 31.0   MCHC 31.1* 31.8* 32.0*   RDW 57.6* 55.6* 54.6*   PLATELETCT 328 334 334   MPV 8.4* 8.1* 8.0*     Recent Labs     04/26/20 0057 04/27/20  0244 04/28/20  0254   SODIUM 139 136 130*   POTASSIUM 5.5 5.0 5.3   CHLORIDE 105 103 98   CO2 24 24 21   GLUCOSE 97 95 170*   BUN 23* 21 20   CREATININE 1.28 1.22 1.25   CALCIUM 7.7* 7.9* 7.9*                   Imaging     Assessment/Plan  * Cellulitis of left lower extremity w tunneling abscess- (present on admission)  Assessment & Plan  Zyvox and Zosyn per ID  Zyvox termed 4/25 - reordered - duration per ID  Follow deep operative cultures  S/P washout 4/22 and 4/24  Cultures 4/22 thus far (+) for group A strep and Pseudomonas    -Arterial ultrasound on 12/27/2019 showed:No evidence of arterial insufficiency in either lower extremity  Continue antibiotics as per ID       Venous stasis ulcer of left lower extremity (HCC)- (present on admission)  Assessment & Plan  S/p I&D on 4/27/2020, continue iv abx as per ID.     Hyperkalemia  Assessment & Plan  Upper limit of normal  Low K diet. Continue monitoring    Venous stasis  dermatitis of both lower extremities- (present on admission)  Assessment & Plan  Wound care, wound vac removed due to bleeding, bandage in place. No more bleeding, LPS to reassess for wound vac placement.     CKD (chronic kidney disease) stage 3, GFR 30-59 ml/min (formerly [facility])- (present on admission)  Assessment & Plan  Acute on chronic  Stable, continue monitoring    Essential hypertension- (present on admission)  Assessment & Plan  Stable, continue monitoring .    Constipation  Assessment & Plan  Continue bowel protocol    Normocytic anemia  Assessment & Plan  Continue monitoring.     Methamphetamine abuse (formerly [facility])- (present on admission)  Assessment & Plan  Positive  UDS    Alcoholism (formerly [facility])- (present on admission)  Assessment & Plan  Continue close monitoring, no signs of alcohol withdrawal, continue multivitamins    Anxiety- (present on admission)  Assessment & Plan  Continue close monitoring       VTE prophylaxis: heparin    Case and plan of care was discussed with nurse staff  Case and plan of care discussed during multidisciplinary rounds

## 2020-04-28 NOTE — ANESTHESIA PROCEDURE NOTES
Airway  Date/Time: 4/27/2020 9:12 PM  Performed by: Lisa Santos M.D.  Authorized by: Lisa Santos M.D.     Location:  OR  Urgency:  Elective  Indications for Airway Management:  Anesthesia      Spontaneous Ventilation: absent    Sedation Level:  Deep  Preoxygenated: Yes    Mask Difficulty Assessment:  0 - not attempted  Final Airway Type:  Supraglottic airway  Final Supraglottic Airway:  Standard LMA  SGA Size:  4  Number of Attempts at Approach:  1

## 2020-04-28 NOTE — CARE PLAN
Problem: Communication  Goal: The ability to communicate needs accurately and effectively will improve  Outcome: PROGRESSING AS EXPECTED  Note: Patient alert and oriented. Demonstrates use of call light and uses appropriately. Patient understanding about diagnosis and interventions.      Problem: Safety  Goal: Will remain free from injury  Outcome: PROGRESSING AS EXPECTED  Goal: Will remain free from falls  Outcome: PROGRESSING AS EXPECTED  Note: Call light in reach. Bed locked in lowest position. Treaded socks on patient. Bed alarm in place.  Hourly rounding in place. Fall precautions noted.

## 2020-04-28 NOTE — OP REPORT
DATE OF SERVICE:  04/27/2020    PREOPERATIVE DIAGNOSES:  Left leg cellulitis and abscess.    POSTOPERATIVE DIAGNOSES:  Left leg cellulitis and abscess.    PROCEDURE PERFORMED:  1.  Left leg irrigation and debridement, multiple compartments.  2.  Placement of wound VAC.    SURGEON:  Pasquale John MD    FIRST ASSISTANT:  ____.    ANESTHESIA:  General endotracheal.    ESTIMATED BLOOD LOSS:  None.    COMPLICATIONS:  None.    POSTOPERATIVE PLAN:  1.  Continue wound VAC changes and ulcer care  2.  Repeat irrigation and debridement and possible skin grafts next week.    INDICATIONS:  The patient is a gentleman who is here for a repeat irrigation   and debridement of his left leg.  The above procedure was discussed and all   questions were answered.  Risks of surgery were explained, which include but   not limited to wound problems, infection, nerve injury, vascular injury, and   need for further surgery.  He understands he could have persistent risk of his   pain, malunion, persistence of his infection, need for further surgery.  He   understands and accepts these risks and agreed to proceed.  His site was   marked by myself prior to receiving psychotropic medicines.    PROCEDURE IN DETAIL:  The patient was brought to the operating room and   underwent general endotracheal anesthesia without complications.  Left lower   extremity was prepped and draped in standard fashion in supine position with   all appropriate padding.  Positive site verification confirmed his left lower   extremity as well as above procedure and confirmation that he received   preoperative antibiotics.  Elevation was used to exsanguinate his foot and ankle   and leg and tourniquet was inflated to 250 mmHg.  Using 3 liters of normal   saline, we irrigated out throughout all compartments of his leg.  The muscle   belly deep still appeared to be viable.  Skin, particularly at the margins,   had become necrotic and these was sharply excised with a 15 blade  down to the   level of fascia.  Once this was completed, a wound VAC was placed with   positive seal.  He was transferred to the recovery room in good condition.       ____________________________________     Pasquale John MD

## 2020-04-29 LAB
ABO + RH BLD: NORMAL
ABO GROUP BLD: NORMAL
ALBUMIN SERPL BCP-MCNC: 2.5 G/DL (ref 3.2–4.9)
ALBUMIN/GLOB SERPL: 0.7 G/DL
ALP SERPL-CCNC: 66 U/L (ref 30–99)
ALT SERPL-CCNC: 6 U/L (ref 2–50)
ANION GAP SERPL CALC-SCNC: 10 MMOL/L (ref 7–16)
AST SERPL-CCNC: 12 U/L (ref 12–45)
BARCODED ABORH UBTYP: 600
BARCODED PRD CODE UBPRD: NORMAL
BARCODED UNIT NUM UBUNT: NORMAL
BASOPHILS # BLD AUTO: 0.5 % (ref 0–1.8)
BASOPHILS # BLD: 0.04 K/UL (ref 0–0.12)
BILIRUB SERPL-MCNC: 0.3 MG/DL (ref 0.1–1.5)
BLD GP AB SCN SERPL QL: NORMAL
BUN SERPL-MCNC: 27 MG/DL (ref 8–22)
CALCIUM SERPL-MCNC: 7.6 MG/DL (ref 8.5–10.5)
CHLORIDE SERPL-SCNC: 96 MMOL/L (ref 96–112)
CO2 SERPL-SCNC: 24 MMOL/L (ref 20–33)
COMPONENT R 8504R: NORMAL
CREAT SERPL-MCNC: 1.62 MG/DL (ref 0.5–1.4)
EOSINOPHIL # BLD AUTO: 0.15 K/UL (ref 0–0.51)
EOSINOPHIL NFR BLD: 2 % (ref 0–6.9)
ERYTHROCYTE [DISTWIDTH] IN BLOOD BY AUTOMATED COUNT: 57.8 FL (ref 35.9–50)
GLOBULIN SER CALC-MCNC: 3.8 G/DL (ref 1.9–3.5)
GLUCOSE SERPL-MCNC: 109 MG/DL (ref 65–99)
HCT VFR BLD AUTO: 21.5 % (ref 42–52)
HCT VFR BLD AUTO: 23.8 % (ref 42–52)
HGB BLD-MCNC: 6.7 G/DL (ref 14–18)
HGB BLD-MCNC: 7.4 G/DL (ref 14–18)
IMM GRANULOCYTES # BLD AUTO: 0.04 K/UL (ref 0–0.11)
IMM GRANULOCYTES NFR BLD AUTO: 0.5 % (ref 0–0.9)
LYMPHOCYTES # BLD AUTO: 1.53 K/UL (ref 1–4.8)
LYMPHOCYTES NFR BLD: 20.1 % (ref 22–41)
MCH RBC QN AUTO: 30.7 PG (ref 27–33)
MCHC RBC AUTO-ENTMCNC: 30.8 G/DL (ref 33.7–35.3)
MCV RBC AUTO: 99.6 FL (ref 81.4–97.8)
MONOCYTES # BLD AUTO: 0.68 K/UL (ref 0–0.85)
MONOCYTES NFR BLD AUTO: 8.9 % (ref 0–13.4)
NEUTROPHILS # BLD AUTO: 5.18 K/UL (ref 1.82–7.42)
NEUTROPHILS NFR BLD: 68 % (ref 44–72)
NRBC # BLD AUTO: 0 K/UL
NRBC BLD-RTO: 0 /100 WBC
PLATELET # BLD AUTO: 269 K/UL (ref 164–446)
PMV BLD AUTO: 8.3 FL (ref 9–12.9)
POTASSIUM SERPL-SCNC: 4.8 MMOL/L (ref 3.6–5.5)
PRODUCT TYPE UPROD: NORMAL
PROT SERPL-MCNC: 6.3 G/DL (ref 6–8.2)
RBC # BLD AUTO: 2.38 M/UL (ref 4.7–6.1)
RH BLD: NORMAL
SODIUM SERPL-SCNC: 130 MMOL/L (ref 135–145)
UNIT STATUS USTAT: NORMAL
WBC # BLD AUTO: 7.6 K/UL (ref 4.8–10.8)

## 2020-04-29 PROCEDURE — 36430 TRANSFUSION BLD/BLD COMPNT: CPT

## 2020-04-29 PROCEDURE — 86901 BLOOD TYPING SEROLOGIC RH(D): CPT

## 2020-04-29 PROCEDURE — 86923 COMPATIBILITY TEST ELECTRIC: CPT

## 2020-04-29 PROCEDURE — 51798 US URINE CAPACITY MEASURE: CPT

## 2020-04-29 PROCEDURE — 700102 HCHG RX REV CODE 250 W/ 637 OVERRIDE(OP): Performed by: FAMILY MEDICINE

## 2020-04-29 PROCEDURE — A9270 NON-COVERED ITEM OR SERVICE: HCPCS | Performed by: NURSE PRACTITIONER

## 2020-04-29 PROCEDURE — 700105 HCHG RX REV CODE 258: Performed by: HOSPITALIST

## 2020-04-29 PROCEDURE — 99233 SBSQ HOSP IP/OBS HIGH 50: CPT | Performed by: HOSPITALIST

## 2020-04-29 PROCEDURE — 700111 HCHG RX REV CODE 636 W/ 250 OVERRIDE (IP): Performed by: INTERNAL MEDICINE

## 2020-04-29 PROCEDURE — 97530 THERAPEUTIC ACTIVITIES: CPT

## 2020-04-29 PROCEDURE — 700105 HCHG RX REV CODE 258: Performed by: INTERNAL MEDICINE

## 2020-04-29 PROCEDURE — 85025 COMPLETE CBC W/AUTO DIFF WBC: CPT

## 2020-04-29 PROCEDURE — P9016 RBC LEUKOCYTES REDUCED: HCPCS

## 2020-04-29 PROCEDURE — 86900 BLOOD TYPING SEROLOGIC ABO: CPT

## 2020-04-29 PROCEDURE — 700111 HCHG RX REV CODE 636 W/ 250 OVERRIDE (IP): Performed by: FAMILY MEDICINE

## 2020-04-29 PROCEDURE — 86850 RBC ANTIBODY SCREEN: CPT

## 2020-04-29 PROCEDURE — 80053 COMPREHEN METABOLIC PANEL: CPT

## 2020-04-29 PROCEDURE — A9270 NON-COVERED ITEM OR SERVICE: HCPCS | Performed by: HOSPITALIST

## 2020-04-29 PROCEDURE — A9270 NON-COVERED ITEM OR SERVICE: HCPCS | Performed by: FAMILY MEDICINE

## 2020-04-29 PROCEDURE — 85014 HEMATOCRIT: CPT

## 2020-04-29 PROCEDURE — 97116 GAIT TRAINING THERAPY: CPT

## 2020-04-29 PROCEDURE — 700102 HCHG RX REV CODE 250 W/ 637 OVERRIDE(OP): Performed by: HOSPITALIST

## 2020-04-29 PROCEDURE — 770001 HCHG ROOM/CARE - MED/SURG/GYN PRIV*

## 2020-04-29 PROCEDURE — 85018 HEMOGLOBIN: CPT

## 2020-04-29 PROCEDURE — 36415 COLL VENOUS BLD VENIPUNCTURE: CPT

## 2020-04-29 PROCEDURE — 700102 HCHG RX REV CODE 250 W/ 637 OVERRIDE(OP): Performed by: NURSE PRACTITIONER

## 2020-04-29 RX ORDER — SODIUM CHLORIDE 9 MG/ML
INJECTION, SOLUTION INTRAVENOUS ONCE
Status: COMPLETED | OUTPATIENT
Start: 2020-04-29 | End: 2020-04-29

## 2020-04-29 RX ADMIN — DOCUSATE SODIUM 50 MG AND SENNOSIDES 8.6 MG 2 TABLET: 8.6; 5 TABLET, FILM COATED ORAL at 05:14

## 2020-04-29 RX ADMIN — OXYCODONE 10 MG: 5 TABLET ORAL at 15:49

## 2020-04-29 RX ADMIN — ATORVASTATIN CALCIUM 40 MG: 40 TABLET, FILM COATED ORAL at 05:15

## 2020-04-29 RX ADMIN — FERROUS GLUCONATE 324 MG: 324 TABLET ORAL at 07:48

## 2020-04-29 RX ADMIN — DOCUSATE SODIUM 50 MG AND SENNOSIDES 8.6 MG 2 TABLET: 8.6; 5 TABLET, FILM COATED ORAL at 18:13

## 2020-04-29 RX ADMIN — LINEZOLID 600 MG: 600 TABLET, FILM COATED ORAL at 18:13

## 2020-04-29 RX ADMIN — PIPERACILLIN AND TAZOBACTAM 4.5 G: 4; .5 INJECTION, POWDER, LYOPHILIZED, FOR SOLUTION INTRAVENOUS; PARENTERAL at 05:16

## 2020-04-29 RX ADMIN — SODIUM CHLORIDE: 9 INJECTION, SOLUTION INTRAVENOUS at 07:50

## 2020-04-29 RX ADMIN — PIPERACILLIN AND TAZOBACTAM 4.5 G: 4; .5 INJECTION, POWDER, LYOPHILIZED, FOR SOLUTION INTRAVENOUS; PARENTERAL at 12:39

## 2020-04-29 RX ADMIN — PIPERACILLIN AND TAZOBACTAM 4.5 G: 4; .5 INJECTION, POWDER, LYOPHILIZED, FOR SOLUTION INTRAVENOUS; PARENTERAL at 21:45

## 2020-04-29 RX ADMIN — MORPHINE SULFATE 4 MG: 4 INJECTION INTRAVENOUS at 10:32

## 2020-04-29 RX ADMIN — SERTRALINE HYDROCHLORIDE 50 MG: 50 TABLET ORAL at 05:15

## 2020-04-29 RX ADMIN — OXYCODONE 10 MG: 5 TABLET ORAL at 01:56

## 2020-04-29 RX ADMIN — OXYCODONE 10 MG: 5 TABLET ORAL at 11:42

## 2020-04-29 RX ADMIN — OXYCODONE 10 MG: 5 TABLET ORAL at 20:23

## 2020-04-29 RX ADMIN — LINEZOLID 600 MG: 600 TABLET, FILM COATED ORAL at 05:14

## 2020-04-29 RX ADMIN — OXYCODONE 10 MG: 5 TABLET ORAL at 06:28

## 2020-04-29 ASSESSMENT — ENCOUNTER SYMPTOMS
SHORTNESS OF BREATH: 0
NAUSEA: 0
WEIGHT LOSS: 0
HEMOPTYSIS: 0
MYALGIAS: 0
DOUBLE VISION: 0
ABDOMINAL PAIN: 0
PHOTOPHOBIA: 0
DIARRHEA: 0
HEADACHES: 0
VOMITING: 0
PALPITATIONS: 0
NECK PAIN: 0
TINGLING: 0
FEVER: 0
DIZZINESS: 0
ORTHOPNEA: 0

## 2020-04-29 ASSESSMENT — GAIT ASSESSMENTS
GAIT LEVEL OF ASSIST: SUPERVISED
ASSISTIVE DEVICE: FRONT WHEEL WALKER
DISTANCE (FEET): 50

## 2020-04-29 ASSESSMENT — COGNITIVE AND FUNCTIONAL STATUS - GENERAL
SUGGESTED CMS G CODE MODIFIER MOBILITY: CH
MOBILITY SCORE: 24

## 2020-04-29 NOTE — PROGRESS NOTES
RN received call from [facility] at 0145 stating Hgb dropped from 9.9 to 7.4. Vitals stable: temp 98.3, HR 62, /87, Oxygen 99% on 2 liters nasal cannula. Patient asymptomatic. Dr. Gallardo notified. No orders at this time. RN to continue to monitor.

## 2020-04-29 NOTE — PROGRESS NOTES
[facility]ERVATION SERVICE   POST SURGICAL PROGRESS NOTE      HPI:  64 y.o.  with a past medical history that includes hypertension, hepatitis C, kidney disease, polysubstance abuse, nondiabetic Charcot's deformity left foot, and chronic venous stasis ulcers bilateral lower extremities admitted 4/16/2020 for Cellulitis of left lower extremity.        LPS has been consulted for evaluation of left lower leg venous stasis ulcers.  The patient has a history of chronic venous stasis ulcers and has been seen in outpatient wound clinic for treatment as well as infectious disease in October 2019 for an infected skin ulcer.  He reports that his previous venostasis ulcers had resolved approximately 1 month ago and he was discharged from the outpatient wound clinic.  He states that 1 week ago (4/12/2020) he was in the shower and noticed that he developed an ulcer on the anterior aspect of his lower leg distal to his knee and then the ulcer had continued to worsen from there radiating to the back of his left lower leg and development of new ulcers down his left lateral lower leg to his ankle.  He reports having fever, chills, and a large amount of clear drainage from his ulcers.  He states that 4/15/2020 his left lower leg began to swell and developed redness.  He was seen in the outpatient wound center where he was found to have significant swelling and erythema to his left lower extremity, weeping from superficial tissue, and foul-smelling odor.    Patient proceeded to ED.  ESR 97, CRP 31.7.  He was started on vancomycin and Zosyn IV and admitted under hospital services.     SURGERY DATE: 04/22/20  PROCEDURE: with Dr. Israel  1.  Left leg irrigation and debridement, multiple compartments, deep down to   the level of muscle.  2.  Left fasciotomy anterior and posterior compartments.  3.  Left placement of wound VAC.    SURGERY DATE: 04/24/20  PROCEDURE: with Dr. Israel  1.  Left leg irrigation and debridement, multiple  compartments.  2.  Placement of wound VAC.    SURGERY DATE: 04/27/20  PROCEDURE: with Dr. John  1.  Left leg irrigation and debridement, multiple compartments.  2.  Placement of wound VAC.    4/22/2020: Patient denies fevers, chills, nausea, vomiting.  Complains of pain with inspection of lower leg ulcers.  Tract with purulent drainage noted yesterday by wound team.  Repeat CT ordered and was negative for abscess or osteomyelitis.  Patient has been n.p.o. since last night.  Dressings that were changed yesterday are completely saturated today.   Patient also has Charcot foot to left foot and complains of severe pain to foot when walking.  4/28/2020: Patient denies fevers, chills, nausea, vomiting.  Pain well controlled.   LPS was called by bedside RN to assess left LE.  Wound VAC placed last night, clotted off when patient arrived to floor, troubleshoot by night RN, unable to resolve.  NOC RN removed drape (not the foam) and the wound started bleeding.  NOC held pressure to wound bed, and applied pressure dressing.    4/29/2020: Patient's Left LE dressing intact, removed pressure dressing with wound care RNOriana.  Still open bleeding with light trickle of, used AgNO3 to help stop small bleeds, larger bleeds pressure dressing replaced by wound RNOriana.       PERTINENT LPS RESULTS:       SURGICAL SITE EXAM:      /82   Pulse (!) 54 Comment: RN notified  Temp 36.7 °C (98.1 °F) (Temporal)   Resp 18   Ht 1.829 m (6')   Wt 90.2 kg (198 lb 13.7 oz)   SpO2 100%   BMI 26.97 kg/m²     Pedal Pulses: palpable pulses  Sensation: LOPS  Foot warm to touch      Wound 04/16/20 Venous Ulcer Ankle Medial;Posterior Left -Left Medial/Posterior Ankle (Active)   Site Assessment Red 4/29/2020 10:00 AM   Periwound Assessment Clean;Dry;Intact 4/29/2020 10:00 AM   Margins Attached edges;Defined edges 4/29/2020 10:00 AM   Closure Secondary intention 4/29/2020 10:00 AM   Drainage Amount Moderate 4/29/2020 10:00 AM      Drainage Description Serosanguineous 4/29/2020 10:00 AM   Treatments Cleansed;Site care 4/29/2020 10:00 AM   Wound Cleansing Normal Saline Irrigation 4/29/2020 10:00 AM   Periwound Protectant Not Applicable 4/29/2020 10:00 AM   Dressing Cleansing/Solutions Not Applicable 4/29/2020 10:00 AM   Dressing Options Absorbent Abdominal Pad;Dry Roll Gauze;Compression Wrap Two Layer 4/29/2020 10:00 AM   Dressing Changed Changed 4/29/2020 10:00 AM   Dressing Status Clean;Dry;Intact 4/29/2020 10:00 AM   Dressing Change/Treatment Frequency By Wound Team Only 4/29/2020 10:00 AM   NEXT Dressing Change/Treatment Date 04/24/20 4/23/2020  7:45 PM   NEXT Weekly Photo (Inpatient Only) 04/29/20 4/23/2020  7:45 PM   Non-staged Wound Description Not applicable 4/28/2020 10:00 AM   Wound Bed Slough % - (Post-Procedure) 100 % 4/21/2020  4:00 PM   Tunneling (cm) 0 cm 4/19/2020  2:15 PM   Undermining (cm) 0 cm 4/19/2020  2:15 PM   Shape Irregular 4/29/2020 10:00 AM   Wound Odor None 4/29/2020 10:00 AM   Pulses Left;1+;DP;PT 4/29/2020 10:00 AM   Exposed Structures Muscle;Tendon;Adipose 4/29/2020 10:00 AM   WOUND NURSE ONLY - Time Spent with Patient (mins) 60 4/21/2020  4:00 PM     Wound care: To be changed by wound team and LPS.       DIABETES MANAGEMENT:    Blood glucose:     A1c:   Lab Results   Component Value Date/Time    HBA1C 5.7 (H) 01/23/2020 11:22 AM            INFECTION MANAGEMENT:    Results from last 7 days   Lab Units 04/29/20  0201 04/28/20  0254 04/27/20  0244 04/26/20  0057 04/25/20  0043 04/24/20  0222   WBC 1501 K/uL 7.6 14.3* 8.3 7.6 10.2 9.5   PLATELET COUNT 1518 K/uL 269 334 334 328 339 347     Wound culture results:   Results     Procedure Component Value Units Date/Time    CULTURE WOUND W/ GRAM STAIN [[id number]]  (Abnormal)  (Susceptibility) Collected:  04/22/20 1203    Order Status:  Completed Specimen:  Wound Updated:  04/25/20 1510     Significant Indicator POS     Source WND     Site Left Leg     Culture Result -      Gram Stain Result Moderate WBCs.  No organisms seen.       Culture Result Pseudomonas aeruginosa  Light growth  P.aeruginosa may develop resistance during prolonged therapy  with all antibiotics. Isolates that are initially susceptible  may become resistant within three to four days after  initiation of therapy. Testing of repeat isolates may be  warranted.        Beta Hemolytic Streptococcus group A  Rare growth      Narrative:       CALL  Rivas  [facility] tel. [phone],  CALLED  [facility] tel. [phone] 04/24/2020, 11:06, RB PERF. RESULTS CALLED  TO:[phone]  Surgery - swabs received    Susceptibility     Pseudomonas aeruginosa (1)     Antibiotic Interpretation Microscan Method Status    Ceftazidime Sensitive <=1 mcg/mL SHANON Final    Ciprofloxacin Resistant >2 mcg/mL SHANON Final    Cefepime Sensitive 4 mcg/mL SHANON Final    Amikacin Sensitive <=16 mcg/mL SHANON Final    Gentamicin Sensitive <=4 mcg/mL SHANON Final    Tobramycin Sensitive <=4 mcg/mL SHANON Final    Meropenem Sensitive <=1 mcg/mL SHANON Final    Pip/Tazobactam Sensitive 64 mcg/mL SHANON Final                   Anaerobic Culture [[id number]] Collected:  04/22/20 1203    Order Status:  Completed Specimen:  Wound Updated:  04/25/20 1510     Significant Indicator NEG     Source WND     Site Left Leg     Culture Result No Anaerobes isolated.    Narrative:       CALL  Rivas  [facility] tel. [phone],  CALLED  [facility] tel. [phone] 04/24/2020, 11:06, RB PERF. RESULTS CALLED  TO:[id number]  Surgery - swabs received    GRAM STAIN [[id number]] Collected:  04/22/20 1203    Order Status:  Completed Specimen:  Wound Updated:  04/22/20 2059     Significant Indicator .     Source WND     Site Left Leg     Gram Stain Result Moderate WBCs.  No organisms seen.      Narrative:       Surgery - swabs received               ASSESSMENT/PLAN:     Surgifoam placed in wound bed, gauze, ABD, rolled gauze and 2 layer compression wrap. wound RN, Oriana Barcenas RN.  Will check on patient tomorrow.      STSG planned for  Monday 5/4/20      Wound care:   - LPS and wound team will do wound care.     Vascular status:   - Palpable pulses    Antibiotics:   - Per ID recommendations    Weight Bearing Status:   -Weight bearing as tolerated    Offloading:   -offload with pillows    PT/OT :   -involved, last seen 4/28    Diabetes Education:   -involved    - Implications of loss of protective sensation (LOPS) discussed with patient- including increased risk for amputation.  Advised to check feet at least daily, moisturize feet, and to always wear protective foot wear.   -avoid trimming own nails. See podiatrist or certified foot and nail RN  -keep blood sugars <150 for improved wound healing      Plan to return to O.R.:   Possible STSG on Monday 5/4/20      DISCHARGE PLAN:    Disposition: pending    Follow-up: pending      Discussed with: pt, RN, Dr. Dora Valdes, R.N.    If any questions or concerns, please call [phone]

## 2020-04-29 NOTE — PROGRESS NOTES
"[facility] Medicine Daily Progress Note    Date of Service  4/29/2020    Chief Complaint  64 y.o. male admitted 4/16/2020 with Left leg redness and swelling.    Hospital Course    64 y.o. male who presented 4/16/2020 with left leg redness and swelling.  Patient has history of venous stasis dermatitis of both legs, and venous stasis ulcers of the left leg.  Patient states that he was discharged from wound care a month ago and said the ulcers had resolved.  But then a week ago he noted that ulcers came back he also noticed increasing redness and swelling and pain.  He denied having any fever or chills, cough or congestion, shortness of breath, abdominal pain, nausea or vomiting, or diarrhea.  On review of his previous wound cultures, he has history of MRSA, Pseudomonas, Serratia, and group B streptococcus.    Cultures on this admissioon grew MRSA and GAS. ID has been following his care. He was eventually transitioned to oral Zyvox. He was improving. His WBCs normalized, he remained afebrile, and he was looking stable for discharge home with antibiotics and wound care. However, after wound care changed the dressing 4/21, mian pus was noted from a tunneling lesion, suggesting need for further I&D.  Patient was taken to the operating room by Dr. Israel.  Initial reports from healthcare personnel suggest there was some component of \"necrotizing fat\".  Antibiotics were broadened slightly to include Unasyn.  The case was discussed with infectious disease who will be following closely.    Patient's undergone multiple washout procedures.  Cultures from deep tissue in the operating room have grown Pseudomonas and GAS.  Antibiotics have been extended accordingly and he is receiving Zyvox and Zosyn.    Interval Problem Update  Patient is resting in bed, feels ok, no n/v/d/c, no fever or chills no abdominal pain.  Hb dropped today he denies dizziness, lightheadedness, no chest pain or sob. "     Consultants/Specialty  ID  LPS  Ortho    Code Status  full    Disposition  To be determined    Review of Systems  Review of Systems   Constitutional: Negative for fever and weight loss.   HENT: Negative for ear discharge, ear pain and nosebleeds.    Eyes: Negative for double vision and photophobia.   Respiratory: Negative for hemoptysis and shortness of breath.    Cardiovascular: Negative for chest pain, palpitations and orthopnea.   Gastrointestinal: Negative for abdominal pain, diarrhea, nausea and vomiting.   Genitourinary: Negative for hematuria and urgency.   Musculoskeletal: Negative for myalgias and neck pain.        Left leg pain   Skin: Negative for itching.   Neurological: Negative for dizziness, tingling and headaches.        Physical Exam  Temp:  [36.4 °C (97.5 °F)-36.8 °C (98.3 °F)] 36.7 °C (98.1 °F)  Pulse:  [54-64] 54  Resp:  [18] 18  BP: (113-160)/(63-87) 140/82  SpO2:  [99 %-100 %] 100 %    Physical Exam  Vitals signs and nursing note reviewed.   Constitutional:       Interventions: Face mask in place.   HENT:      Head: Normocephalic and atraumatic.      Nose: Nose normal.   Eyes:      General:         Right eye: No discharge.         Left eye: No discharge.      Pupils: Pupils are equal, round, and reactive to light.   Neck:      Musculoskeletal: Neck supple.   Cardiovascular:      Rate and Rhythm: Normal rate and regular rhythm.      Heart sounds: Murmur present. Systolic murmur present with a grade of 1/6. No friction rub. No S3 or S4 sounds.    Pulmonary:      Effort: No respiratory distress.      Breath sounds: No rales.   Abdominal:      General: Bowel sounds are normal. There is no distension.      Palpations: Abdomen is soft. There is no mass.      Tenderness: There is no abdominal tenderness.   Musculoskeletal:      Right lower leg: No edema.      Left lower leg: No edema.      Comments: Left lower extremity with bandage in place.  No bleeding   Skin:     General: Skin is warm and dry.    Neurological:      General: No focal deficit present.      Mental Status: He is alert and oriented to person, place, and time.      Cranial Nerves: No cranial nerve deficit.      Motor: No weakness.   Psychiatric:         Mood and Affect: Mood normal.         Behavior: Behavior normal.         Fluids    Intake/Output Summary (Last 24 hours) at 4/29/2020 1133  Last data filed at 4/29/2020 0628  Gross per 24 hour   Intake 477 ml   Output 1150 ml   Net -673 ml       Laboratory  Recent Labs     04/27/20  0244 04/28/20  0254 04/29/20  0201   WBC 8.3 14.3* 7.6   RBC 3.16* 3.19* 2.38*   HEMOGLOBIN 9.7* 9.9* 7.4*   HEMATOCRIT 30.5* 30.9* 23.8*   MCV 96.5 96.9 99.6*   MCH 30.7 31.0 30.7   MCHC 31.8* 32.0* 30.8*   RDW 55.6* 54.6* 57.8*   PLATELETCT 334 334 269   MPV 8.1* 8.0* 8.3*     Recent Labs     04/27/20  0244 04/28/20  0254 04/29/20  0037   SODIUM 136 130* 130*   POTASSIUM 5.0 5.3 4.8   CHLORIDE 103 98 96   CO2 24 21 24   GLUCOSE 95 170* 109*   BUN 21 20 27*   CREATININE 1.22 1.25 1.62*   CALCIUM 7.9* 7.9* 7.6*                   Imaging     Assessment/Plan  * Cellulitis of left lower extremity w tunneling abscess- (present on admission)  Assessment & Plan  Zyvox and Zosyn per ID  Zyvox termed 4/25 - reordered - duration per ID  Follow deep operative cultures  S/P washout 4/22 and 4/24  Cultures 4/22 thus far (+) for group A strep and Pseudomonas    -Arterial ultrasound on 12/27/2019 showed:No evidence of arterial insufficiency in either lower extremity  Continue antibiotics as per ID Zosyn and Zyvox       Venous stasis ulcer of left lower extremity (HCC)- (present on admission)  Assessment & Plan  S/p I&D on 4/27/2020, continue iv abx as per ID.  On Zyvox and Zosyn    Hyperkalemia  Assessment & Plan  Upper limit of normal  Low K diet. Continue monitoring  Potassium improved    Venous stasis dermatitis of both lower extremities- (present on admission)  Assessment & Plan  Wound care, wound vac removed due to bleeding,  bandage in place. No more bleeding, LPS to reassess for wound vac placement in 1 to 2 days    CKD (chronic kidney disease) stage 3, GFR 30-59 ml/min (HCC)- (present on admission)  Assessment & Plan  Acute on chronic  Stable, continue monitoring  Creatinine increased to 1.62, will give gentle IV fluids    Essential hypertension- (present on admission)  Assessment & Plan  Stable, continue monitoring .    Constipation  Assessment & Plan  Continue bowel protocol    Normocytic anemia  Assessment & Plan  Continue monitoring, probably acute on chronic hemoglobin dropped to 7.4 today from 9.9 probably acute bleeding from recent surgery and bleeding from wound VAC, will recheck his H&H at noon if less than 7 will transfuse 1 unit of blood, patient agree with blood transfusion if needed.    Methamphetamine abuse (HCC)- (present on admission)  Assessment & Plan  Positive  UDS    Alcoholism (HCC)- (present on admission)  Assessment & Plan  Continue close monitoring, no signs of alcohol withdrawal, continue multivitamins    Anxiety- (present on admission)  Assessment & Plan  Continue close monitoring       VTE prophylaxis: heparin if hb continue dropping will hold heparin.     Case and plan of care was discussed with nurse staff  Case and plan of care discussed during multidisciplinary rounds

## 2020-04-29 NOTE — DIETARY
Nutrition services: 63 yo male admitted with cellulitis of left lower extremity.  Consult for low albumin.    Evaluation:    1. Albumin is not an indicator of nutritional status. Albumin decreased in the setting of inflammation, CRP was 31.7 now decreased to 14.44. inflammation related to cellulitis with abscess.   2. Pt is on  Regular diet taking % of meals. No issues with meal intake PTA. BMI 26.97 and no recent weight change.   3. No nutrition related issues at this time.     Recommendations/Plan:  1.  Continue to encourage intake of meals.  2.  Nutrition representative to see pt daily for meal and snack preferences.

## 2020-04-29 NOTE — WOUND TEAM
[facility]  Inpatient Services  Wound and Skin Care Progress Note    Admission Date: 4/16/2020     Last order of IP CONSULT TO WOUND CARE was found on 4/17/2020 from Hospital Encounter on 4/16/2020       HPI, PMH, SH: Reviewed    Unit where seen by Wound Team: [id number]     WOUND CONSULT RELATED TO: LLE Vac Placement    Self Report / Pain Level:  No c/o pain       OBJECTIVE:  Pt lying in bed with Coban & Kerlix dressing in place, leg elevated on pillow.      WOUND TYPE, LOCATION, CHARACTERISTICS (Pressure Injuries: location, stage, POA or date identified)    Wound 04/16/20 Venous Ulcer Ankle Medial;Posterior Left -Left Medial/Posterior Ankle (Active)   Wound Image   4/29/2020   Site Assessment Red 4/29/2020   Periwound Assessment Clean;Dry;Intact      Margins Attached edges;Defined edges    Closure Secondary intention    Drainage Amount Moderate    Drainage Description Serosanguineous    Treatments Cleansed;Direct pressure;Site care    Wound Cleansing Normal Saline Irrigation    Periwound Protectant Not Applicable    Dressing Cleansing/Solutions Not Applicable    Dressing Options Absorbent Abdominal Pad;Dry Roll Gauze;Compression Wrap Two Layer    Dressing Changed Changed    Dressing Status Clean;Dry;Intact    Dressing Change/Treatment Frequency By Wound Team Only    NEXT Dressing Change/Treatment Date 04/30/20    NEXT Weekly Photo (Inpatient Only) 05/06/20    Non-staged Wound Description Full thickness    Wound Length (cm) 23 cm    Wound Width (cm) 7 cm    Wound Surface Area (cm^2) 161 cm^2    Shape Irregular    Wound Odor None    Pulses Left;1+;DP    Exposed Structures Muscle;Tendon;Adipose                Vascular:    BHUPINDER:   No results found.      Lab Values:    Lab Results   Component Value Date/Time    WBC 7.6 04/29/2020 02:01 AM    RBC 2.38 (L) 04/29/2020 02:01 AM    HEMOGLOBIN 7.4 (L) 04/29/2020 02:01 AM    HEMATOCRIT 23.8 (L) 04/29/2020 02:01 AM    CREACTPROT 14.44 (H) 04/22/2020 12:49 AM    SEDRATEWES >120 (H) 04/27/2020 02:44 AM    HBA1C 5.7 (H) 01/23/2020 11:22 AM          Culture:   Obtained 4/16,   Culture Results show:  Recent Results (from the past 720 hour(s))   CULTURE WOUND W/ GRAM STAIN    Collection Time: 04/22/20 12:03 PM   Result Value Ref Range    Significant Indicator POS (POS)     Source WND     Site Left Leg     Culture Result - (A)     Gram Stain Result Moderate WBCs.  No organisms seen.       Culture Result (A)      Pseudomonas aeruginosa  Light growth  P.aeruginosa may develop resistance during prolonged therapy  with all antibiotics. Isolates that are initially susceptible  may become resistant within three to four days after  initiation of therapy. Testing of repeat isolates may be  warranted.      Culture Result (A)      Beta Hemolytic Streptococcus group A  Rare growth         Susceptibility    Pseudomonas aeruginosa - SHANON     Ceftazidime <=1 Sensitive mcg/mL     Ciprofloxacin >2 Resistant mcg/mL     Cefepime 4 Sensitive mcg/mL     Amikacin <=16 Sensitive mcg/mL     Gentamicin <=4 Sensitive mcg/mL     Tobramycin <=4 Sensitive mcg/mL     Meropenem <=1 Sensitive mcg/mL     Pip/Tazobactam 64 Sensitive mcg/mL         INTERVENTIONS BY WOUND TEAM: Pt seen by this RN and LPS RN. Dressing removed and cleansed with NS. Active bleeding on sandrine wound cauterized with silver nitrate, please see her full note. Plan to place vac, but r/t bleeding vac was not applied. Pressure dressing applied, will re evaluate for vac placement tomorrow.       Interdisciplinary consultation: Patient, Primary RN, Ronel ROGERS LPS     EVALUATION: Pt post I&D fasciotomy. Wound would benefit form vac placement to improve granulation tissue for skin graft, will plan to place vac tomorrow.       Goals: Steady decrease in wound area and depth weekly.    NURSING PLAN OF CARE ORDERS (X):    Dressing changes: See Dressing Care orders: X  Skin care: See Skin Care orders   Rectal tube care: See Rectal Tube Care orders:      Other orders:      WOUND TEAM PLAN OF CARE    Dressing changes by wound team:          Follow up 1-2 times weekly:                 Follow up 3 times weekly:     X           NPWT change 3 times weekly:     Follow up as needed:       Other (explain):     Anticipated discharge plans:  LTACH:        SNF/Rehab:                  Home Care:           Outpatient Wound Center:         X   Self Care:

## 2020-04-29 NOTE — CARE PLAN
Problem: Safety  Goal: Will remain free from injury  Outcome: PROGRESSING AS EXPECTED  Note: Call light in reach. Bed locked in lowest position. Treaded socks on patient. Bed alarm in place. Hourly rounding in place. Fall precautions noted.    Goal: Will remain free from falls  Outcome: PROGRESSING AS EXPECTED     Problem: Pain Management  Goal: Pain level will decrease to patient's comfort goal  Outcome: PROGRESSING AS EXPECTED  Note: PRN pain medication. Oxycodone given for pain relief. Non pharmacological pain medication option offered.

## 2020-04-29 NOTE — PROGRESS NOTES
Ortho Progress    Patient did not bleed or soak through dressings placed yesterday, dressing changed today and he has a small trickle of bleeding that goes away with gentle pressure.     Wound bed with minimal bleeding but some spot bleeding, no arterial or pulsatile bleeding.      A/P POD 2 LLE I&D    Continue dressings  Will continue dressings and evaluate and try to place a wound vac Friday  Likely Monday we anticipate doing skin grafts over the defect

## 2020-04-29 NOTE — PROGRESS NOTES
2 RN Skin Check    2 RN skin check complete with Jessica ROGERS.   Devices in place: Nasal Cannula, wound vac, and PIV  Skin assessed under devices: yes, minor redness, blanchable  Behind bilateral ears.  Confirmed pressure ulcers found on: n/a. Previous noted ulcer on left calf.   New potential pressure ulcers noted on n/a. Wound consult placed, patient had wound vac on prior to surgery, consult had already been placed  The following interventions in place Pillows and grey foam on nasal cannula, q shift skin assessment and  wound team on board

## 2020-04-29 NOTE — PROGRESS NOTES
RN assumed care at 1900. Patient resting in bed, no signs of distress. Assessment completed, vitals stable. Patient oriented x4. On 2 liters nasal cannula. Patient rated pain 6/10. PRN oxycodone given. Wound dressing clean dry and intact, no signs of bleeding noted. Foot elevated. Bed locked in lowest position. Call light in reach of patient. RN to continue to monitor.

## 2020-04-29 NOTE — PROGRESS NOTES
Assumed care of patient at 0700. Report received from night RN. LAURA&Swathi4 and VSS. Dressing on LLE, clean dry and intact. Pain meds given PRN. Bed locked and in the lowest position. Call bell within reach, able to make needs known.

## 2020-04-29 NOTE — THERAPY
"Physical Therapy Treatment completed.   Bed Mobility:  Supine to Sit: Supervised  Transfers: Sit to Stand: Supervised  Gait: Level Of Assist: Supervised with Front-Wheel Walker       Plan of Care: Patient with no further skilled PT needs in the acute care setting at this time  Discharge Recommendations: Equipment: Front-Wheel Walker. Post-acute therapy: Home with home health physical therapy.    Patient demonstrates fair functional mobility with good safety on stairs, and good UE strength to compensate for poor tolerance.  Reinforced leading with strong lead on way up, and weak leg on way down.  Patient did well, no further skilled physical therapy need at this time.  Recommend home with home health services. Patient will not be actively followed for physical therapy services at this time, however may be seen if requested by physician for 1 more visit within 30 days to address any discharge or equipment needs     Ty Marsha PT    See \"Rehab Therapy-Acute\" Patient Summary Report for complete documentation.       "

## 2020-04-29 NOTE — DISCHARGE PLANNING
Wound vac application initiated for home wound vac with Keisha from [facility]. Copy of H&P, facesheet, LPS note given to Keisha. Await wound care RN Note for wound measurements. In addition, since pt’s albumin is <3.5, a dietary consult needs to be ordered to address wound healing. TT Dr Morton to place dietary consult order. Need Ortho MD to sign Wound Vac Application. Per ortho note, pt to possibly have skin graft Monday 5/4/20. CM/SW to follow for dc needs.

## 2020-04-30 LAB
ANION GAP SERPL CALC-SCNC: 7 MMOL/L (ref 7–16)
BASOPHILS # BLD AUTO: 0.8 % (ref 0–1.8)
BASOPHILS # BLD: 0.06 K/UL (ref 0–0.12)
BUN SERPL-MCNC: 23 MG/DL (ref 8–22)
CALCIUM SERPL-MCNC: 7.7 MG/DL (ref 8.5–10.5)
CHLORIDE SERPL-SCNC: 103 MMOL/L (ref 96–112)
CO2 SERPL-SCNC: 26 MMOL/L (ref 20–33)
CREAT SERPL-MCNC: 1.49 MG/DL (ref 0.5–1.4)
EOSINOPHIL # BLD AUTO: 0.13 K/UL (ref 0–0.51)
EOSINOPHIL NFR BLD: 1.7 % (ref 0–6.9)
ERYTHROCYTE [DISTWIDTH] IN BLOOD BY AUTOMATED COUNT: 55.7 FL (ref 35.9–50)
ERYTHROCYTE [SEDIMENTATION RATE] IN BLOOD BY WESTERGREN METHOD: 80 MM/HOUR (ref 0–20)
GLUCOSE SERPL-MCNC: 93 MG/DL (ref 65–99)
HCT VFR BLD AUTO: 24.5 % (ref 42–52)
HGB BLD-MCNC: 7.6 G/DL (ref 14–18)
IMM GRANULOCYTES # BLD AUTO: 0.04 K/UL (ref 0–0.11)
IMM GRANULOCYTES NFR BLD AUTO: 0.5 % (ref 0–0.9)
LYMPHOCYTES # BLD AUTO: 1.29 K/UL (ref 1–4.8)
LYMPHOCYTES NFR BLD: 17.1 % (ref 22–41)
MCH RBC QN AUTO: 30.9 PG (ref 27–33)
MCHC RBC AUTO-ENTMCNC: 31 G/DL (ref 33.7–35.3)
MCV RBC AUTO: 99.6 FL (ref 81.4–97.8)
MONOCYTES # BLD AUTO: 0.69 K/UL (ref 0–0.85)
MONOCYTES NFR BLD AUTO: 9.2 % (ref 0–13.4)
NEUTROPHILS # BLD AUTO: 5.32 K/UL (ref 1.82–7.42)
NEUTROPHILS NFR BLD: 70.7 % (ref 44–72)
NRBC # BLD AUTO: 0 K/UL
NRBC BLD-RTO: 0 /100 WBC
PLATELET # BLD AUTO: 242 K/UL (ref 164–446)
PMV BLD AUTO: 8.4 FL (ref 9–12.9)
POTASSIUM SERPL-SCNC: 5.2 MMOL/L (ref 3.6–5.5)
RBC # BLD AUTO: 2.46 M/UL (ref 4.7–6.1)
SODIUM SERPL-SCNC: 136 MMOL/L (ref 135–145)
WBC # BLD AUTO: 7.5 K/UL (ref 4.8–10.8)

## 2020-04-30 PROCEDURE — 700102 HCHG RX REV CODE 250 W/ 637 OVERRIDE(OP): Performed by: HOSPITALIST

## 2020-04-30 PROCEDURE — 80048 BASIC METABOLIC PNL TOTAL CA: CPT

## 2020-04-30 PROCEDURE — A9270 NON-COVERED ITEM OR SERVICE: HCPCS | Performed by: HOSPITALIST

## 2020-04-30 PROCEDURE — 700102 HCHG RX REV CODE 250 W/ 637 OVERRIDE(OP): Performed by: NURSE PRACTITIONER

## 2020-04-30 PROCEDURE — 700105 HCHG RX REV CODE 258: Performed by: INTERNAL MEDICINE

## 2020-04-30 PROCEDURE — 770001 HCHG ROOM/CARE - MED/SURG/GYN PRIV*

## 2020-04-30 PROCEDURE — A9270 NON-COVERED ITEM OR SERVICE: HCPCS | Performed by: NURSE PRACTITIONER

## 2020-04-30 PROCEDURE — 700102 HCHG RX REV CODE 250 W/ 637 OVERRIDE(OP): Performed by: FAMILY MEDICINE

## 2020-04-30 PROCEDURE — 85652 RBC SED RATE AUTOMATED: CPT

## 2020-04-30 PROCEDURE — 36415 COLL VENOUS BLD VENIPUNCTURE: CPT

## 2020-04-30 PROCEDURE — 700111 HCHG RX REV CODE 636 W/ 250 OVERRIDE (IP): Performed by: INTERNAL MEDICINE

## 2020-04-30 PROCEDURE — 99232 SBSQ HOSP IP/OBS MODERATE 35: CPT | Performed by: INTERNAL MEDICINE

## 2020-04-30 PROCEDURE — 700111 HCHG RX REV CODE 636 W/ 250 OVERRIDE (IP): Performed by: HOSPITALIST

## 2020-04-30 PROCEDURE — 85025 COMPLETE CBC W/AUTO DIFF WBC: CPT

## 2020-04-30 PROCEDURE — 99232 SBSQ HOSP IP/OBS MODERATE 35: CPT | Performed by: HOSPITALIST

## 2020-04-30 PROCEDURE — 700105 HCHG RX REV CODE 258: Performed by: HOSPITALIST

## 2020-04-30 PROCEDURE — A9270 NON-COVERED ITEM OR SERVICE: HCPCS | Performed by: FAMILY MEDICINE

## 2020-04-30 RX ADMIN — LINEZOLID 600 MG: 600 TABLET, FILM COATED ORAL at 05:00

## 2020-04-30 RX ADMIN — DOCUSATE SODIUM 50 MG AND SENNOSIDES 8.6 MG 2 TABLET: 8.6; 5 TABLET, FILM COATED ORAL at 05:00

## 2020-04-30 RX ADMIN — PIPERACILLIN AND TAZOBACTAM 4.5 G: 4; .5 INJECTION, POWDER, LYOPHILIZED, FOR SOLUTION INTRAVENOUS; PARENTERAL at 05:00

## 2020-04-30 RX ADMIN — PIPERACILLIN AND TAZOBACTAM 4.5 G: 4; .5 INJECTION, POWDER, LYOPHILIZED, FOR SOLUTION INTRAVENOUS; PARENTERAL at 13:18

## 2020-04-30 RX ADMIN — ATORVASTATIN CALCIUM 40 MG: 40 TABLET, FILM COATED ORAL at 05:00

## 2020-04-30 RX ADMIN — FERROUS GLUCONATE 324 MG: 324 TABLET ORAL at 09:29

## 2020-04-30 RX ADMIN — PIPERACILLIN AND TAZOBACTAM 4.5 G: 4; .5 INJECTION, POWDER, LYOPHILIZED, FOR SOLUTION INTRAVENOUS; PARENTERAL at 20:48

## 2020-04-30 RX ADMIN — OXYCODONE 10 MG: 5 TABLET ORAL at 13:18

## 2020-04-30 RX ADMIN — OXYCODONE 10 MG: 5 TABLET ORAL at 17:41

## 2020-04-30 RX ADMIN — LINEZOLID 600 MG: 600 TABLET, FILM COATED ORAL at 17:41

## 2020-04-30 RX ADMIN — OXYCODONE 10 MG: 5 TABLET ORAL at 21:59

## 2020-04-30 RX ADMIN — DOCUSATE SODIUM 50 MG AND SENNOSIDES 8.6 MG 2 TABLET: 8.6; 5 TABLET, FILM COATED ORAL at 17:40

## 2020-04-30 RX ADMIN — OXYCODONE 10 MG: 5 TABLET ORAL at 09:29

## 2020-04-30 RX ADMIN — OXYCODONE 10 MG: 5 TABLET ORAL at 02:50

## 2020-04-30 RX ADMIN — SERTRALINE HYDROCHLORIDE 50 MG: 50 TABLET ORAL at 05:00

## 2020-04-30 ASSESSMENT — ENCOUNTER SYMPTOMS
SHORTNESS OF BREATH: 0
DIARRHEA: 0
SPUTUM PRODUCTION: 0
FOCAL WEAKNESS: 0
CLAUDICATION: 0
DOUBLE VISION: 0
PHOTOPHOBIA: 0
TREMORS: 0
WEIGHT LOSS: 0
HEADACHES: 0
NAUSEA: 0
CONSTIPATION: 0
HEMOPTYSIS: 0
VOMITING: 0
PALPITATIONS: 0
MYALGIAS: 0
ABDOMINAL PAIN: 0
NECK PAIN: 0
MYALGIAS: 1
DIZZINESS: 0
FEVER: 0
CHILLS: 0

## 2020-04-30 NOTE — PROGRESS NOTES
"[facility] Daily Progress Note    Date of Service  4/30/2020    Chief Complaint  64 y.o. male admitted 4/16/2020 with Left leg redness and swelling.    Hospital Course    64 y.o. male who presented 4/16/2020 with left leg redness and swelling.  Patient has history of venous stasis dermatitis of both legs, and venous stasis ulcers of the left leg.  Patient states that he was discharged from wound care a month ago and said the ulcers had resolved.  But then a week ago he noted that ulcers came back he also noticed increasing redness and swelling and pain.  He denied having any fever or chills, cough or congestion, shortness of breath, abdominal pain, nausea or vomiting, or diarrhea.  On review of his previous wound cultures, he has history of MRSA, Pseudomonas, Serratia, and group B streptococcus.    Cultures on this admissioon grew MRSA and GAS. ID has been following his care. He was eventually transitioned to oral Zyvox. He was improving. His WBCs normalized, he remained afebrile, and he was looking stable for discharge home with antibiotics and wound care. However, after wound care changed the dressing 4/21, mian pus was noted from a tunneling lesion, suggesting need for further I&D.  Patient was taken to the operating room by Dr. Israel.  Initial reports from healthcare personnel suggest there was some component of \"necrotizing fat\".  Antibiotics were broadened slightly to include Unasyn.  The case was discussed with infectious disease who will be following closely.    Patient's undergone multiple washout procedures.  Cultures from deep tissue in the operating room have grown Pseudomonas and GAS.  Antibiotics have been extended accordingly and he is receiving Zyvox and Zosyn.    Interval Problem Update  Patient is resting in bed, no new complaints, no fever no chills, pain is better, denies any dizziness lightheadedness, hemoglobin improved to 7.4 after blood transfusion yesterday.  Continue IV " antibiotics as per ID for 10 to 14 days   evaluate and try to place a wound vac Friday Monday possible skin grafts over the defect    Consultants/Specialty  ID  LPS  Ortho    Code Status  full    Disposition  To be determined    Review of Systems  Review of Systems   Constitutional: Negative for fever.   HENT: Negative for ear discharge, ear pain and nosebleeds.    Eyes: Negative for double vision and photophobia.   Respiratory: Negative for hemoptysis and sputum production.    Cardiovascular: Negative for chest pain, palpitations and claudication.   Gastrointestinal: Negative for abdominal pain, nausea and vomiting.   Genitourinary: Negative for frequency, hematuria and urgency.   Musculoskeletal: Negative for myalgias and neck pain.        Left leg pain   Skin: Negative for itching.   Neurological: Negative for dizziness, tremors and headaches.        Physical Exam  Temp:  [36.1 °C (97 °F)-36.9 °C (98.5 °F)] 36.4 °C (97.6 °F)  Pulse:  [60-79] 66  Resp:  [16-18] 16  BP: (124-140)/(71-90) 138/87  SpO2:  [93 %-100 %] 99 %    Physical Exam  Vitals signs and nursing note reviewed.   Constitutional:       Interventions: Face mask in place.   HENT:      Head: Normocephalic and atraumatic.      Nose: Nose normal.   Eyes:      General: No scleral icterus.        Left eye: No discharge.      Pupils: Pupils are equal, round, and reactive to light.   Neck:      Musculoskeletal: Neck supple.   Cardiovascular:      Rate and Rhythm: Normal rate and regular rhythm.      Heart sounds: Murmur present. Systolic murmur present with a grade of 1/6. No friction rub. No S3 or S4 sounds.    Pulmonary:      Effort: No respiratory distress.      Breath sounds: No rales.   Abdominal:      General: Bowel sounds are normal. There is no distension.      Palpations: Abdomen is soft. There is no mass.      Tenderness: There is no abdominal tenderness.   Musculoskeletal:      Right lower leg: No edema.      Left lower leg: No edema.       Comments: Left lower extremity with bandage in place.  No bleeding   Skin:     General: Skin is warm and dry.   Neurological:      General: No focal deficit present.      Mental Status: He is alert and oriented to person, place, and time.      Cranial Nerves: No cranial nerve deficit.      Motor: No weakness.   Psychiatric:         Mood and Affect: Mood normal.         Behavior: Behavior normal.         Fluids    Intake/Output Summary (Last 24 hours) at 4/30/2020 1121  Last data filed at 4/30/2020 0800  Gross per 24 hour   Intake 370 ml   Output 2800 ml   Net -2430 ml       Laboratory  Recent Labs     04/28/20  0254 04/29/20  0201 04/29/20  1205 04/30/20  0238   WBC 14.3* 7.6  --  7.5   RBC 3.19* 2.38*  --  2.46*   HEMOGLOBIN 9.9* 7.4* 6.7* 7.6*   HEMATOCRIT 30.9* 23.8* 21.5* 24.5*   MCV 96.9 99.6*  --  99.6*   MCH 31.0 30.7  --  30.9   MCHC 32.0* 30.8*  --  31.0*   RDW 54.6* 57.8*  --  55.7*   PLATELETCT 334 269  --  242   MPV 8.0* 8.3*  --  8.4*     Recent Labs     04/28/20  0254 04/29/20  0037 04/30/20  0238   SODIUM 130* 130* 136   POTASSIUM 5.3 4.8 5.2   CHLORIDE 98 96 103   CO2 21 24 26   GLUCOSE 170* 109* 93   BUN 20 27* 23*   CREATININE 1.25 1.62* 1.49*   CALCIUM 7.9* 7.6* 7.7*                   Imaging     Assessment/Plan  * Cellulitis of left lower extremity w tunneling abscess- (present on admission)  Assessment & Plan  Zyvox and Zosyn per ID  Zyvox termed 4/25 - reordered - duration per ID  Follow deep operative cultures  S/P washout 4/22 and 4/24  Cultures 4/22 thus far (+) for group A strep and Pseudomonas    -Arterial ultrasound on 12/27/2019 showed:No evidence of arterial insufficiency in either lower extremity  Continue antibiotics as per ID Zosyn and Zyvox  Anticipate 10-14 day course from 4/27/2020       Venous stasis ulcer of left lower extremity (HCC)- (present on admission)  Assessment & Plan  S/p I&D on 4/27/2020, continue iv abx as per ID.  On Zyvox and Zosyn to complete 10 days of  antibiotics from 4/27/2020    Hyperkalemia  Assessment & Plan  Upper limit of normal  Low K diet. Continue monitoring  Potassium improved    Venous stasis dermatitis of both lower extremities- (present on admission)  Assessment & Plan  Wound care, wound vac removed due to bleeding, bandage in place. No more bleeding, LPS to reassess for wound vac placement   Hemoglobin dropped yesterday he status post 1 unit of PRBC hemoglobin better today 7.6    CKD (chronic kidney disease) stage 3, GFR 30-59 ml/min ([facility])- (present on admission)  Assessment & Plan  Acute on chronic  Stable, continue monitoring  Creatinine improved    Essential hypertension- (present on admission)  Assessment & Plan  Stable, continue monitoring .    Constipation  Assessment & Plan  Continue bowel protocol    Normocytic anemia  Assessment & Plan  Continue monitoring, probably acute on chronic hemoglobin required 1 unit of PRBC, hemoglobin improved 7.6 today.    Methamphetamine abuse ([facility])- (present on admission)  Assessment & Plan  Positive  UDS    Alcoholism ([facility])- (present on admission)  Assessment & Plan  Continue close monitoring, no signs of alcohol withdrawal, continue multivitamins    Anxiety- (present on admission)  Assessment & Plan  Continue close monitoring       VTE prophylaxis: heparin if hb continue dropping will hold heparin.     Case and plan of care was discussed with nurse staff  Case and plan of care discussed during multidisciplinary rounds

## 2020-04-30 NOTE — PROGRESS NOTES
Infectious Disease Progress Note    Author: Karen Hemphill M.D. Date & Time of service: 2020  9:05 AM    Chief Complaint:  Follow-up for LLE infection    Interval History:   afebrile, white count 10.2, tolerating Zyvox and Zosyn.  Reports feeling better overall.   afebrile, white count 7.6, tolerating antibiotics with no new issues.   afebrile WBC 8.3 plan for repeat I&D today, denies pain, no new clinical issues   AF WBC 14.3 S/p repeat I&D yesterday. Wound vac removed given excessive bloody drainage. Rate pain 6-7/10 in severity.   AF WBC 7.5 had blood transfusion over night and feeling better. No new complaints. LLE dressed without any bloody drainage      Labs Reviewed and Medications Reviewed.    Review of Systems:  Review of Systems   Constitutional: Negative for chills, fever and weight loss.   Respiratory: Negative for shortness of breath.    Gastrointestinal: Negative for abdominal pain, constipation, diarrhea, nausea and vomiting.   Genitourinary: Negative for dysuria.   Musculoskeletal: Positive for myalgias.   Skin: Negative for itching and rash.   Neurological: Negative for dizziness, focal weakness and headaches.   All other systems reviewed and are negative.      Hemodynamics:  Temp (24hrs), Av.6 °C (97.9 °F), Min:36.1 °C (97 °F), Max:36.9 °C (98.5 °F)  Temperature: 36.4 °C (97.6 °F)  Pulse  Av.1  Min: 54  Max: 127   Blood Pressure: 138/87       Physical Exam:  Physical Exam  Vitals signs and nursing note reviewed.   Constitutional:       General: He is not in acute distress.     Appearance: He is not ill-appearing.      Comments: disheveled   HENT:      Head: Normocephalic and atraumatic.      Right Ear: External ear normal.      Left Ear: External ear normal.      Mouth/Throat:      Mouth: Mucous membranes are moist.      Pharynx: No oropharyngeal exudate.      Comments: Poor dentition  Eyes:      General: No scleral icterus.     Conjunctiva/sclera: Conjunctivae  normal.      Pupils: Pupils are equal, round, and reactive to light.   Neck:      Musculoskeletal: Neck supple.   Cardiovascular:      Rate and Rhythm: Normal rate and regular rhythm.      Pulses: Normal pulses.      Heart sounds: Normal heart sounds. No murmur.   Pulmonary:      Effort: Pulmonary effort is normal. No respiratory distress.      Breath sounds: No wheezing.   Abdominal:      General: There is no distension.      Palpations: Abdomen is soft.      Tenderness: There is no abdominal tenderness.   Musculoskeletal: Normal range of motion.         General: Swelling present.      Comments: Left lower extremity dressed   Lymphadenopathy:      Cervical: No cervical adenopathy.   Skin:     General: Skin is warm.      Findings: No erythema or rash.   Neurological:      General: No focal deficit present.      Mental Status: He is alert and oriented to person, place, and time.   Psychiatric:         Mood and Affect: Mood normal.         Behavior: Behavior normal.      Comments: Pleasant         Meds:    Current Facility-Administered Medications:   •  linezolid  •  senna-docusate  •  sertraline  •  polyethylene glycol/lytes  •  magnesium hydroxide  •  [COMPLETED] piperacillin-tazobactam **AND** piperacillin-tazobactam  •  atorvastatin  •  ferrous gluconate  •  acetaminophen  •  ondansetron  •  ondansetron  •  promethazine  •  promethazine  •  prochlorperazine  •  hydrALAZINE  •  oxyCODONE immediate-release  •  morphine injection    Labs:  Recent Labs     04/28/20  0254 04/29/20  0201 04/29/20  1205 04/30/20  0238   WBC 14.3* 7.6  --  7.5   RBC 3.19* 2.38*  --  2.46*   HEMOGLOBIN 9.9* 7.4* 6.7* 7.6*   HEMATOCRIT 30.9* 23.8* 21.5* 24.5*   MCV 96.9 99.6*  --  99.6*   MCH 31.0 30.7  --  30.9   RDW 54.6* 57.8*  --  55.7*   PLATELETCT 334 269  --  242   MPV 8.0* 8.3*  --  8.4*   NEUTSPOLYS 95.70* 68.00  --  70.70   LYMPHOCYTES 2.20* 20.10*  --  17.10*   MONOCYTES 1.10 8.90  --  9.20   EOSINOPHILS 0.00 2.00  --  1.70      BASOPHILS 0.40 0.50  --  0.80     Recent Labs     04/28/20  0254 04/29/20  0037 04/30/20  0238   SODIUM 130* 130* 136   POTASSIUM 5.3 4.8 5.2   CHLORIDE 98 96 103   CO2 21 24 26   GLUCOSE 170* 109* 93   BUN 20 27* 23*     Recent Labs     04/28/20  0254 04/29/20  0037 04/30/20  0238   ALBUMIN 2.7* 2.5*  --    TBILIRUBIN 0.4 0.3  --    ALKPHOSPHAT 80 66  --    TOTPROTEIN 6.8 6.3  --    ALTSGPT 6 6  --    ASTSGOT 13 12  --    CREATININE 1.25 1.62* 1.49*       Imaging:  Ct-extremity, Lower With Left    Result Date: 4/21/2020 4/21/2020 5:16 PM HISTORY/REASON FOR EXAM:  Lower leg pain, suspected osteomyelitis. TECHNIQUE/EXAM DESCRIPTION AND NUMBER OF VIEWS:  CT scan of the LEFT lower extremity with contrast, with reconstructions. Thin helical 3 mm sections were obtained from the distal femur through the proximal tibia/fibula. Sagittal and coronal multiplanar reconstructions were generated from the axial images. A total of 100 mL of Omnipaque 350 nonionic contrast was administered  IV without complication. Up to date radiation dose reduction adjustments have been utilized to meet ALARA standards for radiation dose reduction. COMPARISON: 4/16/2020. FINDINGS: No acute fracture or dislocation is identified. Cellulitis of the lower leg, with soft tissue prominence and subcutaneous air. No subjacent cortical bone destruction to suggest osteomyelitis. No drainable abscess. There is chronic appearing valgus deviation of the foot at the subtalar joint, and there is suspected pes planovalgus. Moderate subtalar osteoarthrosis. Multifocal mild to moderate osteoarthrosis in the midfoot. Mild multifocal interphalangeal osteoarthrosis. Reactive popliteal lymphadenopathy.     1. No evidence of osteomyelitis. 2. Cellulitis and soft tissue organs of the left lower leg. Subcutaneous air without drainable abscess. 3. Suspected pes planovalgus deformity. Multifocal osteoarthrosis of the midfoot.    Ct-extremity, Lower With Left    Result  Date: 4/16/2020 4/16/2020 1:45 PM HISTORY/REASON FOR EXAM:  Lower leg swelling/redness, cellulitis suspected. Left lower extremity edema and redness TECHNIQUE/EXAM DESCRIPTION AND NUMBER OF VIEWS:  CT scan of the LEFT lower extremity with contrast, with reconstructions. Thin helical 3 mm sections were obtained from the distal femur through the proximal tibia/fibula. Sagittal and coronal multiplanar reconstructions were generated from the axial images. A total of 100 mL of Omnipaque 350 nonionic contrast was administered  IV without complication. Up to date radiation dose reduction adjustments have been utilized to meet ALARA standards for radiation dose reduction. COMPARISON: 12/26/2019 FINDINGS: There is diffuse subcutaneous edema in the lower leg. The overlying skin is markedly thickened. There is curvilinear fluid deep to the fascia, but superficial to the gastrocnemius muscle extending from the knee joint to the ankle. Edema extends into the deeper fascial planes. No soft tissue gas is identified. No definite muscle necrosis is identified. No thrombus is identified. The Achilles tendon is intact. The bones are osteopenic. No cortical destruction is identified.     1.  Extensive subcutaneous edema with overlying skin thickening. Inflammation extends along the fascial planes with curvilinear fluid superficial to the gastrocnemius muscle. No soft tissue gas to suggest necrotizing fasciitis. 2.  Osteopenia    Us-renal    Result Date: 4/19/2020 4/19/2020 10:53 AM HISTORY/REASON FOR EXAM:  Acute renal failure TECHNIQUE/EXAM DESCRIPTION: Renal ultrasound. COMPARISON:  08/15/2018 FINDINGS: The right kidney measures 8.11 cm. The left kidney measures 9.17 cm. There is no hydronephrosis. There are no abnormal calcifications. Small right pleural effusion is identified. The bladder demonstrates no focal wall abnormality.     1.  Right kidney appears smaller than left kidney which could be due to atrophy. 2.  No  hydronephrosis. 3.  Incidentally noted small right pleural effusion.      Micro:  Results     Procedure Component Value Units Date/Time    CULTURE WOUND W/ GRAM STAIN [[id number]]  (Abnormal)  (Susceptibility) Collected:  04/22/20 1203    Order Status:  Completed Specimen:  Wound Updated:  04/25/20 1510     Significant Indicator POS     Source WND     Site Left Leg     Culture Result -     Gram Stain Result Moderate WBCs.  No organisms seen.       Culture Result Pseudomonas aeruginosa  Light growth  P.aeruginosa may develop resistance during prolonged therapy  with all antibiotics. Isolates that are initially susceptible  may become resistant within three to four days after  initiation of therapy. Testing of repeat isolates may be  warranted.        Beta Hemolytic Streptococcus group A  Rare growth      Narrative:       CALL  Rivas  [facility] tel. [phone],  CALLED  [facility] tel. [phone] 04/24/2020, 11:06, RB PERF. RESULTS CALLED  TO:[id number]  Surgery - swabs received    Susceptibility     Pseudomonas aeruginosa (1)     Antibiotic Interpretation Microscan Method Status    Ceftazidime Sensitive <=1 mcg/mL SHANON Final    Ciprofloxacin Resistant >2 mcg/mL SHANON Final    Cefepime Sensitive 4 mcg/mL SHANON Final    Amikacin Sensitive <=16 mcg/mL SHANON Final    Gentamicin Sensitive <=4 mcg/mL SHANON Final    Tobramycin Sensitive <=4 mcg/mL SHANON Final    Meropenem Sensitive <=1 mcg/mL SHANON Final    Pip/Tazobactam Sensitive 64 mcg/mL SHANON Final                   Anaerobic Culture [[id number]] Collected:  04/22/20 1203    Order Status:  Completed Specimen:  Wound Updated:  04/25/20 1510     Significant Indicator NEG     Source WND     Site Left Leg     Culture Result No Anaerobes isolated.    Narrative:       CALL  Rivas  [facility] tel. [phone],  CALLED  [facility] tel. [phone] 04/24/2020, 11:06, RB PERF. RESULTS CALLED  TO:[phone]  Surgery - swabs received          Assessment:  64-year-old male with methamphetamine abuse, hepatitis C status post treatment,  bilateral lower extremity venous stasis dermatitis with ulcerations, admitted with left leg cellulitis and fever    Pertinent Diagnoses  Left lower extremity cellulitis  Chronic venous stasis changes bilateral lower extremities  Methamphetamine abuse  Hepatitis C status post treatment    Plan:  -CT of the left lower extremity on 4/21 with no osteomyelitis or drainable abscess  -Patient underwent I&D on 4/22 down to muscle, returned to the OR on 4/24 for repeat I&D of multiple compartments and placement of wound VAC skin at the margins was noted to be necrotic  -S/p repeat I&D and [place] change on 4/27 by Dr. John  -Blood culture showed no growth to date  -Wound cultures on 4/22 positive for group A strep and MRSA  -Intraoperative wound cx showed pseudomonas aeruginosa and GAS  -Continue wound care and recommend follow-up with outpatient wound clinic post discharge  -Plan for repeat I&D with skin graft on 5/4/2020  -Continue linezolid and IV zosyn  -Monitor CBC while on linezolid  -Anticipate 10-14 day course from 4/27/2020      Discussed with internal medicine/Dr Garcia.  ID will follow.  Please call with questions.

## 2020-04-30 NOTE — PROGRESS NOTES
Assumed care at 1900, report from [place] ROGERS. Patient resting in bed, no signs of distress, blood infusing. Assessment completed. Vitals stable. Patient alert and oriented x4, on 2 liters nasal cannula to sleep. Dressing on left leg clean dry and intact. Left leg elevated. Pain 7/10 in left leg, PRN oxycodone given. Bed locked in lowest position. Call light in reach of patient. RN to continue to monitor.

## 2020-04-30 NOTE — CARE PLAN
Problem: Safety  Goal: Will remain free from injury  Outcome: PROGRESSING AS EXPECTED  Goal: Will remain free from falls  Outcome: PROGRESSING AS EXPECTED  Note:   Call light in reach. Bed locked in lowest position. Treaded socks on patient. Hourly rounding in place. Fall precautions noted.       Problem: Pain Management  Goal: Pain level will decrease to patient's comfort goal  Outcome: PROGRESSING AS EXPECTED  Note: Frequent pain assessments and re-assessments. PRN pain medication given per order. Non pharmacological pain management in place.

## 2020-04-30 NOTE — CARE PLAN
Problem: Communication  Goal: The ability to communicate needs accurately and effectively will improve  Outcome: PROGRESSING AS EXPECTED     Problem: Safety  Goal: Will remain free from injury  Outcome: PROGRESSING AS EXPECTED  Goal: Will remain free from falls  Outcome: PROGRESSING AS EXPECTED     Problem: Infection  Goal: Will remain free from infection  Outcome: PROGRESSING AS EXPECTED     Problem: Knowledge Deficit  Goal: Knowledge of disease process/condition, treatment plan, diagnostic tests, and medications will improve  Outcome: PROGRESSING AS EXPECTED  Goal: Knowledge of the prescribed therapeutic regimen will improve  Outcome: PROGRESSING AS EXPECTED

## 2020-04-30 NOTE — PROGRESS NOTES
Assume pt care at 1230. Report received from KEN Us. Pt is A&O x4, on RA. Bed is in the lowest/locked position, call light and belongings are within reach. Hourly rounding in place.

## 2020-05-01 ENCOUNTER — APPOINTMENT (OUTPATIENT)
Dept: WOUND CARE | Facility: MEDICAL CENTER | Age: 65
End: 2020-05-01
Attending: NURSE PRACTITIONER
Payer: MEDICAID

## 2020-05-01 LAB
ANION GAP SERPL CALC-SCNC: 10 MMOL/L (ref 7–16)
BASOPHILS # BLD AUTO: 1 % (ref 0–1.8)
BASOPHILS # BLD: 0.07 K/UL (ref 0–0.12)
BUN SERPL-MCNC: 22 MG/DL (ref 8–22)
CALCIUM SERPL-MCNC: 7.6 MG/DL (ref 8.5–10.5)
CHLORIDE SERPL-SCNC: 103 MMOL/L (ref 96–112)
CO2 SERPL-SCNC: 24 MMOL/L (ref 20–33)
CREAT SERPL-MCNC: 1.26 MG/DL (ref 0.5–1.4)
EOSINOPHIL # BLD AUTO: 0.14 K/UL (ref 0–0.51)
EOSINOPHIL NFR BLD: 1.9 % (ref 0–6.9)
ERYTHROCYTE [DISTWIDTH] IN BLOOD BY AUTOMATED COUNT: 56.4 FL (ref 35.9–50)
GLUCOSE SERPL-MCNC: 98 MG/DL (ref 65–99)
HCT VFR BLD AUTO: 23.1 % (ref 42–52)
HCT VFR BLD AUTO: 23.4 % (ref 42–52)
HGB BLD-MCNC: 7.1 G/DL (ref 14–18)
HGB BLD-MCNC: 7.3 G/DL (ref 14–18)
IMM GRANULOCYTES # BLD AUTO: 0.03 K/UL (ref 0–0.11)
IMM GRANULOCYTES NFR BLD AUTO: 0.4 % (ref 0–0.9)
LYMPHOCYTES # BLD AUTO: 1.2 K/UL (ref 1–4.8)
LYMPHOCYTES NFR BLD: 16.4 % (ref 22–41)
MCH RBC QN AUTO: 30.3 PG (ref 27–33)
MCHC RBC AUTO-ENTMCNC: 30.7 G/DL (ref 33.7–35.3)
MCV RBC AUTO: 98.7 FL (ref 81.4–97.8)
MONOCYTES # BLD AUTO: 0.76 K/UL (ref 0–0.85)
MONOCYTES NFR BLD AUTO: 10.4 % (ref 0–13.4)
NEUTROPHILS # BLD AUTO: 5.12 K/UL (ref 1.82–7.42)
NEUTROPHILS NFR BLD: 69.9 % (ref 44–72)
NRBC # BLD AUTO: 0 K/UL
NRBC BLD-RTO: 0 /100 WBC
PLATELET # BLD AUTO: 246 K/UL (ref 164–446)
PMV BLD AUTO: 8.2 FL (ref 9–12.9)
POTASSIUM SERPL-SCNC: 4.8 MMOL/L (ref 3.6–5.5)
RBC # BLD AUTO: 2.34 M/UL (ref 4.7–6.1)
SODIUM SERPL-SCNC: 137 MMOL/L (ref 135–145)
WBC # BLD AUTO: 7.3 K/UL (ref 4.8–10.8)

## 2020-05-01 PROCEDURE — 36415 COLL VENOUS BLD VENIPUNCTURE: CPT

## 2020-05-01 PROCEDURE — 99232 SBSQ HOSP IP/OBS MODERATE 35: CPT | Performed by: HOSPITALIST

## 2020-05-01 PROCEDURE — 770001 HCHG ROOM/CARE - MED/SURG/GYN PRIV*

## 2020-05-01 PROCEDURE — A9270 NON-COVERED ITEM OR SERVICE: HCPCS | Performed by: HOSPITALIST

## 2020-05-01 PROCEDURE — 85018 HEMOGLOBIN: CPT

## 2020-05-01 PROCEDURE — 85014 HEMATOCRIT: CPT

## 2020-05-01 PROCEDURE — A9270 NON-COVERED ITEM OR SERVICE: HCPCS | Performed by: NURSE PRACTITIONER

## 2020-05-01 PROCEDURE — 700111 HCHG RX REV CODE 636 W/ 250 OVERRIDE (IP): Performed by: HOSPITALIST

## 2020-05-01 PROCEDURE — 80048 BASIC METABOLIC PNL TOTAL CA: CPT

## 2020-05-01 PROCEDURE — 85025 COMPLETE CBC W/AUTO DIFF WBC: CPT

## 2020-05-01 PROCEDURE — 700102 HCHG RX REV CODE 250 W/ 637 OVERRIDE(OP): Performed by: NURSE PRACTITIONER

## 2020-05-01 PROCEDURE — A9270 NON-COVERED ITEM OR SERVICE: HCPCS | Performed by: FAMILY MEDICINE

## 2020-05-01 PROCEDURE — 700105 HCHG RX REV CODE 258: Performed by: HOSPITALIST

## 2020-05-01 PROCEDURE — 700102 HCHG RX REV CODE 250 W/ 637 OVERRIDE(OP): Performed by: HOSPITALIST

## 2020-05-01 PROCEDURE — 700102 HCHG RX REV CODE 250 W/ 637 OVERRIDE(OP): Performed by: FAMILY MEDICINE

## 2020-05-01 PROCEDURE — 99232 SBSQ HOSP IP/OBS MODERATE 35: CPT | Performed by: INTERNAL MEDICINE

## 2020-05-01 RX ADMIN — OXYCODONE 10 MG: 5 TABLET ORAL at 21:36

## 2020-05-01 RX ADMIN — ATORVASTATIN CALCIUM 40 MG: 40 TABLET, FILM COATED ORAL at 04:50

## 2020-05-01 RX ADMIN — FERROUS GLUCONATE 324 MG: 324 TABLET ORAL at 07:45

## 2020-05-01 RX ADMIN — PIPERACILLIN AND TAZOBACTAM 4.5 G: 4; .5 INJECTION, POWDER, LYOPHILIZED, FOR SOLUTION INTRAVENOUS; PARENTERAL at 13:24

## 2020-05-01 RX ADMIN — OXYCODONE 10 MG: 5 TABLET ORAL at 09:00

## 2020-05-01 RX ADMIN — OXYCODONE 10 MG: 5 TABLET ORAL at 17:31

## 2020-05-01 RX ADMIN — PIPERACILLIN AND TAZOBACTAM 4.5 G: 4; .5 INJECTION, POWDER, LYOPHILIZED, FOR SOLUTION INTRAVENOUS; PARENTERAL at 04:50

## 2020-05-01 RX ADMIN — PIPERACILLIN AND TAZOBACTAM 4.5 G: 4; .5 INJECTION, POWDER, LYOPHILIZED, FOR SOLUTION INTRAVENOUS; PARENTERAL at 21:36

## 2020-05-01 RX ADMIN — LINEZOLID 600 MG: 600 TABLET, FILM COATED ORAL at 04:50

## 2020-05-01 RX ADMIN — OXYCODONE 10 MG: 5 TABLET ORAL at 13:24

## 2020-05-01 RX ADMIN — LINEZOLID 600 MG: 600 TABLET, FILM COATED ORAL at 17:31

## 2020-05-01 RX ADMIN — DOCUSATE SODIUM 50 MG AND SENNOSIDES 8.6 MG 2 TABLET: 8.6; 5 TABLET, FILM COATED ORAL at 04:50

## 2020-05-01 RX ADMIN — OXYCODONE 10 MG: 5 TABLET ORAL at 02:36

## 2020-05-01 RX ADMIN — SERTRALINE HYDROCHLORIDE 50 MG: 50 TABLET ORAL at 04:50

## 2020-05-01 ASSESSMENT — ENCOUNTER SYMPTOMS
WEIGHT LOSS: 0
FEVER: 0
DIARRHEA: 0
HEMOPTYSIS: 0
NAUSEA: 0
HEARTBURN: 0
HEADACHES: 0
VOMITING: 0
SPUTUM PRODUCTION: 0
CHILLS: 0
SHORTNESS OF BREATH: 0
CONSTIPATION: 0
NECK PAIN: 0
MYALGIAS: 1
FOCAL WEAKNESS: 0
DIZZINESS: 0
TREMORS: 0
PHOTOPHOBIA: 0
DOUBLE VISION: 0
MYALGIAS: 0
ABDOMINAL PAIN: 0
PALPITATIONS: 0

## 2020-05-01 NOTE — PROGRESS NOTES
Assumed care for pt for NOC shift. Pt is resting in bed, pt denies current needs. Rates ain at 7/10, pain meds available at 2141, pt okay to wait till then, pt refused tylenol.  No signs of distress, pt assessed. Bed in lowest locked position, call light in reach. Safety precautions in place.

## 2020-05-01 NOTE — CARE PLAN
Problem: Communication  Goal: The ability to communicate needs accurately and effectively will improve  Outcome: PROGRESSING AS EXPECTED    Pt uses call light and makes needs known appropriately.     Problem: Safety  Goal: Will remain free from injury  Outcome: PROGRESSING AS EXPECTED    Pt free from new injuries this shift.

## 2020-05-01 NOTE — PROGRESS NOTES
"[facility] Medicine Daily Progress Note    Date of Service  5/1/2020    Chief Complaint  64 y.o. male admitted 4/16/2020 with Left leg redness and swelling.    Hospital Course    64 y.o. male who presented 4/16/2020 with left leg redness and swelling.  Patient has history of venous stasis dermatitis of both legs, and venous stasis ulcers of the left leg.  Patient states that he was discharged from wound care a month ago and said the ulcers had resolved.  But then a week ago he noted that ulcers came back he also noticed increasing redness and swelling and pain.  He denied having any fever or chills, cough or congestion, shortness of breath, abdominal pain, nausea or vomiting, or diarrhea.  On review of his previous wound cultures, he has history of MRSA, Pseudomonas, Serratia, and group B streptococcus.    Cultures on this admissioon grew MRSA and GAS. ID has been following his care. He was eventually transitioned to oral Zyvox. He was improving. His WBCs normalized, he remained afebrile, and he was looking stable for discharge home with antibiotics and wound care. However, after wound care changed the dressing 4/21, mian pus was noted from a tunneling lesion, suggesting need for further I&D.  Patient was taken to the operating room by Dr. Israel.  Initial reports from healthcare personnel suggest there was some component of \"necrotizing fat\".  Antibiotics were broadened slightly to include Unasyn.  The case was discussed with infectious disease who will be following closely.    Patient's undergone multiple washout procedures.  Cultures from deep tissue in the operating room have grown Pseudomonas and GAS.  Antibiotics have been extended accordingly and he is receiving Zyvox and Zosyn.    Interval Problem Update  Patient is resting in bed, no new complaints, no fever no chills, pain is better, denies any dizziness lightheadedness, hemoglobin improved to 7.4 after blood transfusion yesterday.  Continue IV antibiotics " as per ID for 10 to 14 days   evaluate and try to place a wound vac Friday Monday possible skin grafts over the defect    Consultants/Specialty  ID  LPS  Ortho    Code Status  full    Disposition  To be determined    Review of Systems  Review of Systems   Constitutional: Negative for fever.   HENT: Negative for ear discharge, ear pain and nosebleeds.    Eyes: Negative for double vision and photophobia.   Respiratory: Negative for hemoptysis and sputum production.    Cardiovascular: Negative for chest pain and palpitations.   Gastrointestinal: Negative for abdominal pain, heartburn and vomiting.   Genitourinary: Negative for frequency and hematuria.   Musculoskeletal: Negative for myalgias and neck pain.        Left leg pain   Skin: Negative for itching.   Neurological: Negative for dizziness, tremors and headaches.        Physical Exam  Temp:  [36.4 °C (97.6 °F)-37.1 °C (98.8 °F)] 36.4 °C (97.6 °F)  Pulse:  [61-72] 61  Resp:  [16-18] 18  BP: (136-162)/(74-86) 162/84  SpO2:  [94 %-97 %] 94 %    Physical Exam  Vitals signs and nursing note reviewed.   Constitutional:       Interventions: Face mask in place.   HENT:      Head: Normocephalic and atraumatic.      Nose: Nose normal.   Eyes:      General: No scleral icterus.     Pupils: Pupils are equal, round, and reactive to light.   Neck:      Musculoskeletal: Neck supple.   Cardiovascular:      Rate and Rhythm: Normal rate and regular rhythm.      Heart sounds: Murmur present. Systolic murmur present with a grade of 1/6. No friction rub. No S3 or S4 sounds.    Pulmonary:      Effort: No respiratory distress.      Breath sounds: No wheezing.   Abdominal:      General: Bowel sounds are normal. There is no distension.      Palpations: Abdomen is soft.      Tenderness: There is no abdominal tenderness.   Musculoskeletal: Normal range of motion.      Right lower leg: No edema.      Left lower leg: No edema.      Comments: Left lower extremity with bandage in place.  No  bleeding   Skin:     General: Skin is warm and dry.   Neurological:      General: No focal deficit present.      Mental Status: He is alert and oriented to person, place, and time.      Cranial Nerves: No cranial nerve deficit.      Motor: No weakness.   Psychiatric:         Mood and Affect: Mood normal.         Behavior: Behavior normal.         Fluids    Intake/Output Summary (Last 24 hours) at 5/1/2020 1030  Last data filed at 5/1/2020 0900  Gross per 24 hour   Intake 600 ml   Output 900 ml   Net -300 ml       Laboratory  Recent Labs     04/29/20  0201 04/29/20  1205 04/30/20  0238 05/01/20  0025   WBC 7.6  --  7.5 7.3   RBC 2.38*  --  2.46* 2.34*   HEMOGLOBIN 7.4* 6.7* 7.6* 7.1*   HEMATOCRIT 23.8* 21.5* 24.5* 23.1*   MCV 99.6*  --  99.6* 98.7*   MCH 30.7  --  30.9 30.3   MCHC 30.8*  --  31.0* 30.7*   RDW 57.8*  --  55.7* 56.4*   PLATELETCT 269  --  242 246   MPV 8.3*  --  8.4* 8.2*     Recent Labs     04/29/20  0037 04/30/20  0238 05/01/20  0025   SODIUM 130* 136 137   POTASSIUM 4.8 5.2 4.8   CHLORIDE 96 103 103   CO2 24 26 24   GLUCOSE 109* 93 98   BUN 27* 23* 22   CREATININE 1.62* 1.49* 1.26   CALCIUM 7.6* 7.7* 7.6*                   Imaging     Assessment/Plan  * Cellulitis of left lower extremity w tunneling abscess- (present on admission)  Assessment & Plan  Zyvox and Zosyn per ID  Zyvox termed 4/25 - reordered - duration per ID  Follow deep operative cultures  S/P washout 4/22 and 4/24  Cultures 4/22 thus far (+) for group A strep and Pseudomonas    -Arterial ultrasound on 12/27/2019 showed:No evidence of arterial insufficiency in either lower extremity  Continue antibiotics as per ID Zosyn and Zyvox  Anticipate 10-14 day course from 4/27/2020  Probably graft on 5/4/2020       Venous stasis ulcer of left lower extremity (HCC)- (present on admission)  Assessment & Plan  S/p I&D on 4/27/2020, continue iv abx as per ID.  On Zyvox and Zosyn to complete 10 days of antibiotics from 4/27/2020  Probably graft on  Monday, 5/4/2020    Hyperkalemia  Assessment & Plan  Upper limit of normal  Low K diet. Continue monitoring  Potassium improved    Venous stasis dermatitis of both lower extremities- (present on admission)  Assessment & Plan  Wound care, wound vac removed due to bleeding, bandage in place. No more bleeding, LPS to reassess for wound vac placement   Hemoglobin dropped, he status post 1 unit of PRBC on 4/29/2020 hemoglobin today 7.1 with repeat H&H at noon.    CKD (chronic kidney disease) stage 3, GFR 30-59 ml/min ([facility])- (present on admission)  Assessment & Plan  Acute on chronic  Stable, continue monitoring  Creatinine improved    Essential hypertension- (present on admission)  Assessment & Plan  Stable, continue monitoring .    Constipation  Assessment & Plan  Continue bowel protocol    Normocytic anemia  Assessment & Plan  Continue monitoring, probably acute on chronic hemoglobin required 1 unit of PRBC, repeat H&H at noon    Methamphetamine abuse ([facility])- (present on admission)  Assessment & Plan  Positive  UDS    Alcoholism ([facility])- (present on admission)  Assessment & Plan  Continue close monitoring, no signs of alcohol withdrawal, continue multivitamins    Anxiety- (present on admission)  Assessment & Plan  Continue close monitoring       VTE prophylaxis: heparin if hb continue dropping will hold heparin. Start scd's in the non affected leg.     Case and plan of care was discussed with nurse staff  Case and plan of care discussed during multidisciplinary rounds

## 2020-05-01 NOTE — PROGRESS NOTES
Ortho Progress    Wound examined and vac placed by LPS today, no bleeding, vac with good seal      A/P LLE wound s/p debridement of strep infection and venous stasis ulcers    Leave wound vac on    Planning to return to OR Monday for irrigation and placement of skin grafts and vac change.  NPO order placed.

## 2020-05-01 NOTE — PROGRESS NOTES
Assumed care of pt at 0700. VSS, AAOX4. Pt c/o LLE pain. Medicated w/oxy. Wound care to see pt and place wound vac today. Plan of care reviewed w/pt. Hourly rounding in place. Will continue to monitor.

## 2020-05-01 NOTE — PROGRESS NOTES
Infectious Disease Progress Note    Author: Mayte Fuller M.D. Date & Time of service: 2020  12:27 PM    Chief Complaint:  Follow-up for necrotizing LLE infection/cellulitis    Interval History:  64-year-old male with methamphetamine abuse, hepatitis C status post treatment, bilateral lower extremity venous stasis dermatitis with ulcerations, admitted 2020 with left leg cellulitis and fever   afebrile, white count 10.2, tolerating Zyvox and Zosyn.  Reports feeling better overall.   afebrile, white count 7.6, tolerating antibiotics with no new issues.   afebrile WBC 8.3 plan for repeat I&D today, denies pain, no new clinical issues   AF WBC 14.3 S/p repeat I&D yesterday. Wound vac removed given excessive bloody drainage. Rate pain 6-7/10 in severity.   AF WBC 7.5 had blood transfusion over night and feeling better. No new complaints. LLE dressed without any bloody drainage   AF WBC 7.3 LLE dressed-pain controlled States plan for skin graft possibly Mon. No new complaints    Labs Reviewed and Medications Reviewed.    Review of Systems:  Review of Systems   Constitutional: Negative for chills, fever and weight loss.   Respiratory: Negative for shortness of breath.    Gastrointestinal: Negative for abdominal pain, constipation, diarrhea, nausea and vomiting.   Genitourinary: Negative for dysuria.   Musculoskeletal: Positive for myalgias.   Skin: Negative for itching and rash.   Neurological: Negative for dizziness, focal weakness and headaches.   All other systems reviewed and are negative.      Hemodynamics:  Temp (24hrs), Av.9 °C (98.4 °F), Min:36.4 °C (97.6 °F), Max:37.1 °C (98.8 °F)  Temperature: 36.4 °C (97.6 °F)  Pulse  Av.8  Min: 54  Max: 127   Blood Pressure: (!) 162/84(Rn notified)       Physical Exam:  Physical Exam  Vitals signs and nursing note reviewed.   Constitutional:       General: He is not in acute distress.     Appearance: He is not ill-appearing,  toxic-appearing or diaphoretic.      Comments: disheveled   HENT:      Head: Normocephalic and atraumatic.      Nose: No rhinorrhea.      Mouth/Throat:      Pharynx: No oropharyngeal exudate.      Comments: Poor dentition  Eyes:      General: No scleral icterus.     Pupils: Pupils are equal, round, and reactive to light.   Neck:      Musculoskeletal: Neck supple. No muscular tenderness.   Cardiovascular:      Rate and Rhythm: Normal rate and regular rhythm.      Heart sounds: No murmur.   Pulmonary:      Effort: Pulmonary effort is normal. No respiratory distress.      Breath sounds: No stridor.   Abdominal:      General: Abdomen is flat. There is no distension.      Palpations: Abdomen is soft.      Tenderness: There is no abdominal tenderness.   Musculoskeletal: Normal range of motion.         General: Swelling present.      Comments: Left lower extremity dressed   Skin:     General: Skin is warm.      Coloration: Skin is not jaundiced.      Findings: No rash.   Neurological:      General: No focal deficit present.      Mental Status: He is alert and oriented to person, place, and time.   Psychiatric:         Mood and Affect: Mood normal.         Behavior: Behavior normal.      Comments: Pleasant         Meds:    Current Facility-Administered Medications:   •  linezolid  •  senna-docusate  •  sertraline  •  polyethylene glycol/lytes  •  magnesium hydroxide  •  [COMPLETED] piperacillin-tazobactam **AND** piperacillin-tazobactam  •  atorvastatin  •  ferrous gluconate  •  acetaminophen  •  ondansetron  •  ondansetron  •  promethazine  •  promethazine  •  prochlorperazine  •  hydrALAZINE  •  oxyCODONE immediate-release  •  morphine injection    Labs:  Recent Labs     04/29/20  0201  04/30/20  0238 05/01/20  0025 05/01/20  1102   WBC 7.6  --  7.5 7.3  --    RBC 2.38*  --  2.46* 2.34*  --    HEMOGLOBIN 7.4*   < > 7.6* 7.1* 7.3*   HEMATOCRIT 23.8*   < > 24.5* 23.1* 23.4*   MCV 99.6*  --  99.6* 98.7*  --    MCH 30.7  --   30.9 30.3  --    RDW 57.8*  --  55.7* 56.4*  --    PLATELETCT 269  --  242 246  --    MPV 8.3*  --  8.4* 8.2*  --    NEUTSPOLYS 68.00  --  70.70 69.90  --    LYMPHOCYTES 20.10*  --  17.10* 16.40*  --    MONOCYTES 8.90  --  9.20 10.40  --    EOSINOPHILS 2.00  --  1.70 1.90  --    BASOPHILS 0.50  --  0.80 1.00  --     < > = values in this interval not displayed.     Recent Labs     04/29/20 0037 04/30/20 0238 05/01/20  0025   SODIUM 130* 136 137   POTASSIUM 4.8 5.2 4.8   CHLORIDE 96 103 103   CO2 24 26 24   GLUCOSE 109* 93 98   BUN 27* 23* 22     Recent Labs     04/29/20 0037 04/30/20 0238 05/01/20  0025   ALBUMIN 2.5*  --   --    TBILIRUBIN 0.3  --   --    ALKPHOSPHAT 66  --   --    TOTPROTEIN 6.3  --   --    ALTSGPT 6  --   --    ASTSGOT 12  --   --    CREATININE 1.62* 1.49* 1.26       Imaging:  Ct-extremity, Lower With Left    Result Date: 4/21/2020 4/21/2020 5:16 PM HISTORY/REASON FOR EXAM:  Lower leg pain, suspected osteomyelitis. TECHNIQUE/EXAM DESCRIPTION AND NUMBER OF VIEWS:  CT scan of the LEFT lower extremity with contrast, with reconstructions. Thin helical 3 mm sections were obtained from the distal femur through the proximal tibia/fibula. Sagittal and coronal multiplanar reconstructions were generated from the axial images. A total of 100 mL of Omnipaque 350 nonionic contrast was administered  IV without complication. Up to date radiation dose reduction adjustments have been utilized to meet ALARA standards for radiation dose reduction. COMPARISON: 4/16/2020. FINDINGS: No acute fracture or dislocation is identified. Cellulitis of the lower leg, with soft tissue prominence and subcutaneous air. No subjacent cortical bone destruction to suggest osteomyelitis. No drainable abscess. There is chronic appearing valgus deviation of the foot at the subtalar joint, and there is suspected pes planovalgus. Moderate subtalar osteoarthrosis. Multifocal mild to moderate osteoarthrosis in the midfoot. Mild  multifocal interphalangeal osteoarthrosis. Reactive popliteal lymphadenopathy.     1. No evidence of osteomyelitis. 2. Cellulitis and soft tissue organs of the left lower leg. Subcutaneous air without drainable abscess. 3. Suspected pes planovalgus deformity. Multifocal osteoarthrosis of the midfoot.    Ct-extremity, Lower With Left    Result Date: 4/16/2020 4/16/2020 1:45 PM HISTORY/REASON FOR EXAM:  Lower leg swelling/redness, cellulitis suspected. Left lower extremity edema and redness TECHNIQUE/EXAM DESCRIPTION AND NUMBER OF VIEWS:  CT scan of the LEFT lower extremity with contrast, with reconstructions. Thin helical 3 mm sections were obtained from the distal femur through the proximal tibia/fibula. Sagittal and coronal multiplanar reconstructions were generated from the axial images. A total of 100 mL of Omnipaque 350 nonionic contrast was administered  IV without complication. Up to date radiation dose reduction adjustments have been utilized to meet ALARA standards for radiation dose reduction. COMPARISON: 12/26/2019 FINDINGS: There is diffuse subcutaneous edema in the lower leg. The overlying skin is markedly thickened. There is curvilinear fluid deep to the fascia, but superficial to the gastrocnemius muscle extending from the knee joint to the ankle. Edema extends into the deeper fascial planes. No soft tissue gas is identified. No definite muscle necrosis is identified. No thrombus is identified. The Achilles tendon is intact. The bones are osteopenic. No cortical destruction is identified.     1.  Extensive subcutaneous edema with overlying skin thickening. Inflammation extends along the fascial planes with curvilinear fluid superficial to the gastrocnemius muscle. No soft tissue gas to suggest necrotizing fasciitis. 2.  Osteopenia    Us-renal    Result Date: 4/19/2020 4/19/2020 10:53 AM HISTORY/REASON FOR EXAM:  Acute renal failure TECHNIQUE/EXAM DESCRIPTION: Renal ultrasound. COMPARISON:  08/15/2018  FINDINGS: The right kidney measures 8.11 cm. The left kidney measures 9.17 cm. There is no hydronephrosis. There are no abnormal calcifications. Small right pleural effusion is identified. The bladder demonstrates no focal wall abnormality.     1.  Right kidney appears smaller than left kidney which could be due to atrophy. 2.  No hydronephrosis. 3.  Incidentally noted small right pleural effusion.      Micro:  Results     Procedure Component Value Units Date/Time    CULTURE WOUND W/ GRAM STAIN [[id number]]  (Abnormal)  (Susceptibility) Collected:  04/22/20 1203    Order Status:  Completed Specimen:  Wound Updated:  04/25/20 1510     Significant Indicator POS     Source WND     Site Left Leg     Culture Result -     Gram Stain Result Moderate WBCs.  No organisms seen.       Culture Result Pseudomonas aeruginosa  Light growth  P.aeruginosa may develop resistance during prolonged therapy  with all antibiotics. Isolates that are initially susceptible  may become resistant within three to four days after  initiation of therapy. Testing of repeat isolates may be  warranted.        Beta Hemolytic Streptococcus group A  Rare growth      Narrative:       CALL  Rivas  RSTA tel. [phone],  CALLED  RSTA tel. [phone] 04/24/2020, 11:06, RB PERF. RESULTS CALLED  TO:[id number]  Surgery - swabs received    Susceptibility     Pseudomonas aeruginosa (1)     Antibiotic Interpretation Microscan Method Status    Ceftazidime Sensitive <=1 mcg/mL SHANON Final    Ciprofloxacin Resistant >2 mcg/mL SHANNO Final    Cefepime Sensitive 4 mcg/mL SHANON Final    Amikacin Sensitive <=16 mcg/mL SHANON Final    Gentamicin Sensitive <=4 mcg/mL SHANON Final    Tobramycin Sensitive <=4 mcg/mL SHANON Final    Meropenem Sensitive <=1 mcg/mL SHANON Final    Pip/Tazobactam Sensitive 64 mcg/mL SHANON Final                   Anaerobic Culture [[id number]] Collected:  04/22/20 1203    Order Status:  Completed Specimen:  Wound Updated:  04/25/20 1510     Significant Indicator NEG      Source WND     Site Left Leg     Culture Result No Anaerobes isolated.    Narrative:       CALL  Rivas  RSTA tel. [phone],  CALLED  RSTA tel. [phone] 04/24/2020, 11:06, RB PERF. RESULTS CALLED  TO:[phone]  Surgery - swabs received          Assessment/Plan:  Left lower extremity cellulitis, complicated  Chronic venous stasis   Afebrile  Resolved leukocytosis  CT of the left lower extremity on 4/21 with no osteomyelitis or drainable abscess  s/p I&D on 4/22 down to muscle  S/p repeat I and D on 4/24 for repeat I&D of multiple compartments and placement of wound VAC skin at the margins was noted to be necrotic  S/p repeat I&D and WV change on 4/27 by Dr. John  Blood culture showed no growth to date  Wound cultures on 4/22 positive for group A strep and MRSA  Intraoperative wound cx showed pseudomonas aeruginosa and GAS  Continue wound care and recommend follow-up with outpatient wound clinic post discharge  Plan for repeat I&D with skin graft on 5/4/2020  Continue linezolid and IV zosyn for now  Monitor CBC while on linezolid  Anticipated stop date abx 5/10/2020 if no further complications    Methamphetamine abuse    Hepatitis C   status post treatment  No additional treatment at this time    LUPE, improved  Avoiding nephrotoxic agents          Discussed with internal medicine/Dr Garcia.

## 2020-05-01 NOTE — PROGRESS NOTES
[facility] PRESERVATION SERVICE   POST SURGICAL PROGRESS NOTE      HPI:  64 y.o.  with a past medical history that includes hypertension, hepatitis C, kidney disease, polysubstance abuse, nondiabetic Charcot's deformity left foot, and chronic venous stasis ulcers bilateral lower extremities admitted 4/16/2020 for Cellulitis of left lower extremity.        LPS has been consulted for evaluation of left lower leg venous stasis ulcers.  The patient has a history of chronic venous stasis ulcers and has been seen in outpatient wound clinic for treatment as well as infectious disease in October 2019 for an infected skin ulcer.  He reports that his previous venostasis ulcers had resolved approximately 1 month ago and he was discharged from the outpatient wound clinic.  He states that 1 week ago (4/12/2020) he was in the shower and noticed that he developed an ulcer on the anterior aspect of his lower leg distal to his knee and then the ulcer had continued to worsen from there radiating to the back of his left lower leg and development of new ulcers down his left lateral lower leg to his ankle.  He reports having fever, chills, and a large amount of clear drainage from his ulcers.  He states that 4/15/2020 his left lower leg began to swell and developed redness.  He was seen in the outpatient wound center where he was found to have significant swelling and erythema to his left lower extremity, weeping from superficial tissue, and foul-smelling odor.    Patient proceeded to ED.  ESR 97, CRP 31.7.  He was started on vancomycin and Zosyn IV and admitted under hospital services.     SURGERY DATE: 04/22/20  PROCEDURE: with Dr. Israel  1.  Left leg irrigation and debridement, multiple compartments, deep down to   the level of muscle.  2.  Left fasciotomy anterior and posterior compartments.  3.  Left placement of wound VAC.    SURGERY DATE: 04/24/20  PROCEDURE: with Dr. Israel  1.  Left leg irrigation and debridement, multiple  compartments.  2.  Placement of wound VAC.    SURGERY DATE: 04/27/20  PROCEDURE: with Dr. John  1.  Left leg irrigation and debridement, multiple compartments.  2.  Placement of wound VAC.    4/22/2020: Patient denies fevers, chills, nausea, vomiting.  Complains of pain with inspection of lower leg ulcers.  Tract with purulent drainage noted yesterday by wound team.  Repeat CT ordered and was negative for abscess or osteomyelitis.  Patient has been n.p.o. since last night.  Dressings that were changed yesterday are completely saturated today.   Patient also has Charcot foot to left foot and complains of severe pain to foot when walking.  4/28/2020: Patient denies fevers, chills, nausea, vomiting.  Pain well controlled.   LPS was called by bedside RN to assess left LE.  Wound VAC placed last night, clotted off when patient arrived to floor, troubleshoot by night RN, unable to resolve.  NOC RN removed drape (not the foam) and the wound started bleeding.  NOC held pressure to wound bed, and applied pressure dressing.    4/29/2020: Patient's Left LE dressing intact, removed pressure dressing with wound care RNOriana.  Still open bleeding with light trickle of, used AgNO3 to help stop small bleeds, larger bleeds pressure dressing replaced by wound RNOriana.   5/01/2020: Patient denies fever, chills, n/v.  No complaints of pain.  Dressing removed and wounds assessed.  Wound VAC was placed on Left LE Anterior/Lateral wound.  2 layer compression wrap placed to left LE.  Plan for patient to go to surgery on Monday.       PERTINENT LPS RESULTS:   No new pertinent results    SURGICAL SITE EXAM:      BP (!) 162/84 Comment: Rn notified  Pulse 61   Temp 36.4 °C (97.6 °F) (Temporal)   Resp 18   Ht 1.829 m (6')   Wt 90.2 kg (198 lb 13.7 oz)   SpO2 94%   BMI 26.97 kg/m²     Pedal Pulses: palpable pulses  Sensation: LOPS  Foot warm to touch    Procedure: Marylou-wound protected with no sting skin prep and drape.  1 black  foam to main wound of left LE lateral side, 1 black foam to bridge to the anterior wound, and 1 black foam on anterior wound bed.  Total 3 black foam used.  Viscopaste patch placed on open areas of partial thickness opening on circumferential ankle area and left medial LE.  Super absorbant pad placed at ankle area.  2 layer coflex compression wrap applied to left LE for compression with cut out for the wound VAC TRAC pad.  Padded TRAC pad tubing with mepilex and snaked it upward towards knee to offload pressure on LLE site.        Wound 04/16/20 Venous Ulcer Ankle Medial;Posterior Left -Left Medial/Posterior Ankle (Active)   Wound Image   4/29/2020  5:00 PM   Site Assessment Pink;Red;White;Yellow;Bleeding 5/1/2020  3:00 PM   Periwound Assessment Intact;Fragile;Hyperpigmented 5/1/2020  3:00 PM   Margins Attached edges 5/1/2020  3:00 PM   Closure Secondary intention 5/1/2020  3:00 PM   Drainage Amount Large 5/1/2020  3:00 PM   Drainage Description Serosanguineous 5/1/2020  3:00 PM   Treatments Cleansed;Irrigation;Site care 5/1/2020  3:00 PM   Wound Cleansing Not Applicable 5/1/2020  3:00 PM   Periwound Protectant Skin Protectant Wipes to Periwound;Drape 5/1/2020  3:00 PM   Dressing Cleansing/Solutions Not Applicable 5/1/2020  3:00 PM   Dressing Options Wound Vac;Viscopaste;Compression Wrap Two Layer 5/1/2020  3:00 PM   Dressing Changed Changed 5/1/2020  3:00 PM   Dressing Status Clean;Dry;Intact 5/1/2020  3:00 PM   Dressing Change/Treatment Frequency By Wound Team Only 5/1/2020  3:00 PM   NEXT Dressing Change/Treatment Date 05/04/20 5/1/2020  3:00 PM   NEXT Weekly Photo (Inpatient Only) 05/06/20 5/1/2020  3:00 PM   Non-staged Wound Description Not applicable 5/1/2020  3:00 PM   Wound Length (cm) 23 cm 4/29/2020  5:00 PM   Wound Width (cm) 7 cm 4/29/2020  5:00 PM   Wound Surface Area (cm^2) 161 cm^2 4/29/2020  5:00 PM   Wound Bed Slough % - (Post-Procedure) 100 % 4/21/2020  4:00 PM   Tunneling (cm) 0 cm 4/19/2020  2:15  PM   Undermining (cm) 0 cm 4/19/2020  2:15 PM   Shape Irregular 5/1/2020  3:00 PM   Wound Odor Mild 5/1/2020  3:00 PM   Pulses Left;1+;DP;PT 5/1/2020  3:00 PM   Exposed Structures Muscle;Tendon;Connective tissue 5/1/2020  3:00 PM   WOUND NURSE ONLY - Time Spent with Patient (mins) 60 4/29/2020  5:00 PM     Wound care: To be changed by wound team and LPS.       DIABETES MANAGEMENT:    Blood glucose:     A1c:   Lab Results   Component Value Date/Time    HBA1C 5.7 (H) 01/23/2020 11:22 AM            INFECTION MANAGEMENT:    Results from last 7 days   Lab Units 05/01/20  0025 04/30/20  0238 04/29/20  0201 04/28/20  0254 04/27/20  0244 04/26/20  0057   WBC 1501 K/uL 7.3 7.5 7.6 14.3* 8.3 7.6   PLATELET COUNT 1518 K/uL 246 242 269 334 334 328     Wound culture results:   Results     Procedure Component Value Units Date/Time    CULTURE WOUND W/ GRAM STAIN [[id number]]  (Abnormal)  (Susceptibility) Collected:  04/22/20 1203    Order Status:  Completed Specimen:  Wound Updated:  04/25/20 1510     Significant Indicator POS     Source WND     Site Left Leg     Culture Result -     Gram Stain Result Moderate WBCs.  No organisms seen.       Culture Result Pseudomonas aeruginosa  Light growth  P.aeruginosa may develop resistance during prolonged therapy  with all antibiotics. Isolates that are initially susceptible  may become resistant within three to four days after  initiation of therapy. Testing of repeat isolates may be  warranted.        Beta Hemolytic Streptococcus group A  Rare growth      Narrative:       CALL  Rivas  RSTA tel. [phone],  CALLED  RSTA tel. [phone] 04/24/2020, 11:06, RB PERF. RESULTS CALLED  TO:[phone]  Surgery - swabs received    Susceptibility     Pseudomonas aeruginosa (1)     Antibiotic Interpretation Microscan Method Status    Ceftazidime Sensitive <=1 mcg/mL SHANON Final    Ciprofloxacin Resistant >2 mcg/mL SHANON Final    Cefepime Sensitive 4 mcg/mL SHANON Final    Amikacin Sensitive <=16 mcg/mL SHANON Final       Gentamicin Sensitive <=4 mcg/mL SHANON Final    Tobramycin Sensitive <=4 mcg/mL SHANON Final    Meropenem Sensitive <=1 mcg/mL SHANON Final    Pip/Tazobactam Sensitive 64 mcg/mL SHANON Final                   Anaerobic Culture [[id number]] Collected:  04/22/20 1203    Order Status:  Completed Specimen:  Wound Updated:  04/25/20 1510     Significant Indicator NEG     Source WND     Site Left Leg     Culture Result No Anaerobes isolated.    Narrative:       CALL  Rivas  RSTA tel. [phone],  CALLED  RSTA tel. [phone] 04/24/2020, 11:06, RB PERF. RESULTS CALLED  TO:[id number]  Surgery - swabs received               ASSESSMENT/PLAN:   Wound bed had stopped bleed at distal end.  Proximal wound bed has a trickle of blood that stopped when pressurized.  Surgifoam was pressurized to the area and had stopped bleeding prior to having wound VAC placed on wound.      STSG planned for Monday 5/4/20      Wound care:   - LPS and wound team will do wound care.     Vascular status:   - Palpable pulses    Antibiotics:   - Per ID recommendations    Weight Bearing Status:   -Weight bearing as tolerated    Offloading:   -offload with pillows    PT/OT :   -involved, last seen 4/29      Plan to return to [facility].:   [facility] for STSG on Monday 5/4/20      DISCHARGE PLAN:    Disposition: recommendation to SNF or Rehab     Follow-up: pending      Discussed with: pt, RN, Dr. John, wound team, and Dr. Praveen Valdes, R.N.    If any questions or concerns, please call [phone]

## 2020-05-02 LAB
BASOPHILS # BLD AUTO: 0.8 % (ref 0–1.8)
BASOPHILS # BLD: 0.06 K/UL (ref 0–0.12)
EOSINOPHIL # BLD AUTO: 0.15 K/UL (ref 0–0.51)
EOSINOPHIL NFR BLD: 1.9 % (ref 0–6.9)
ERYTHROCYTE [DISTWIDTH] IN BLOOD BY AUTOMATED COUNT: 55.7 FL (ref 35.9–50)
HCT VFR BLD AUTO: 24.9 % (ref 42–52)
HGB BLD-MCNC: 7.7 G/DL (ref 14–18)
IMM GRANULOCYTES # BLD AUTO: 0.03 K/UL (ref 0–0.11)
IMM GRANULOCYTES NFR BLD AUTO: 0.4 % (ref 0–0.9)
LYMPHOCYTES # BLD AUTO: 1.25 K/UL (ref 1–4.8)
LYMPHOCYTES NFR BLD: 15.7 % (ref 22–41)
MCH RBC QN AUTO: 30.8 PG (ref 27–33)
MCHC RBC AUTO-ENTMCNC: 30.9 G/DL (ref 33.7–35.3)
MCV RBC AUTO: 99.6 FL (ref 81.4–97.8)
MONOCYTES # BLD AUTO: 0.68 K/UL (ref 0–0.85)
MONOCYTES NFR BLD AUTO: 8.5 % (ref 0–13.4)
NEUTROPHILS # BLD AUTO: 5.81 K/UL (ref 1.82–7.42)
NEUTROPHILS NFR BLD: 72.7 % (ref 44–72)
NRBC # BLD AUTO: 0 K/UL
NRBC BLD-RTO: 0 /100 WBC
PLATELET # BLD AUTO: 228 K/UL (ref 164–446)
PMV BLD AUTO: 8.4 FL (ref 9–12.9)
RBC # BLD AUTO: 2.5 M/UL (ref 4.7–6.1)
WBC # BLD AUTO: 8 K/UL (ref 4.8–10.8)

## 2020-05-02 PROCEDURE — A9270 NON-COVERED ITEM OR SERVICE: HCPCS | Performed by: FAMILY MEDICINE

## 2020-05-02 PROCEDURE — 700102 HCHG RX REV CODE 250 W/ 637 OVERRIDE(OP): Performed by: HOSPITALIST

## 2020-05-02 PROCEDURE — A9270 NON-COVERED ITEM OR SERVICE: HCPCS | Performed by: NURSE PRACTITIONER

## 2020-05-02 PROCEDURE — 700102 HCHG RX REV CODE 250 W/ 637 OVERRIDE(OP): Performed by: FAMILY MEDICINE

## 2020-05-02 PROCEDURE — 99232 SBSQ HOSP IP/OBS MODERATE 35: CPT | Performed by: HOSPITALIST

## 2020-05-02 PROCEDURE — 36415 COLL VENOUS BLD VENIPUNCTURE: CPT

## 2020-05-02 PROCEDURE — 700105 HCHG RX REV CODE 258: Performed by: HOSPITALIST

## 2020-05-02 PROCEDURE — 99232 SBSQ HOSP IP/OBS MODERATE 35: CPT | Performed by: INTERNAL MEDICINE

## 2020-05-02 PROCEDURE — 85025 COMPLETE CBC W/AUTO DIFF WBC: CPT

## 2020-05-02 PROCEDURE — 700102 HCHG RX REV CODE 250 W/ 637 OVERRIDE(OP): Performed by: NURSE PRACTITIONER

## 2020-05-02 PROCEDURE — 700111 HCHG RX REV CODE 636 W/ 250 OVERRIDE (IP): Performed by: HOSPITALIST

## 2020-05-02 PROCEDURE — A9270 NON-COVERED ITEM OR SERVICE: HCPCS | Performed by: HOSPITALIST

## 2020-05-02 PROCEDURE — 770021 HCHG ROOM/CARE - ISO PRIVATE

## 2020-05-02 RX ORDER — AMLODIPINE BESYLATE 5 MG/1
2.5 TABLET ORAL
Status: DISCONTINUED | OUTPATIENT
Start: 2020-05-02 | End: 2020-05-03

## 2020-05-02 RX ORDER — DIPHENHYDRAMINE HCL 25 MG
25 TABLET ORAL EVERY 6 HOURS PRN
Status: DISCONTINUED | OUTPATIENT
Start: 2020-05-02 | End: 2020-05-21 | Stop reason: HOSPADM

## 2020-05-02 RX ADMIN — OXYCODONE 10 MG: 5 TABLET ORAL at 19:19

## 2020-05-02 RX ADMIN — OXYCODONE 10 MG: 5 TABLET ORAL at 01:52

## 2020-05-02 RX ADMIN — OXYCODONE 10 MG: 5 TABLET ORAL at 09:55

## 2020-05-02 RX ADMIN — PIPERACILLIN AND TAZOBACTAM 4.5 G: 4; .5 INJECTION, POWDER, LYOPHILIZED, FOR SOLUTION INTRAVENOUS; PARENTERAL at 05:05

## 2020-05-02 RX ADMIN — PIPERACILLIN AND TAZOBACTAM 4.5 G: 4; .5 INJECTION, POWDER, LYOPHILIZED, FOR SOLUTION INTRAVENOUS; PARENTERAL at 13:40

## 2020-05-02 RX ADMIN — DIPHENHYDRAMINE HYDROCHLORIDE 25 MG: 25 TABLET ORAL at 21:20

## 2020-05-02 RX ADMIN — LINEZOLID 600 MG: 600 TABLET, FILM COATED ORAL at 05:05

## 2020-05-02 RX ADMIN — DOCUSATE SODIUM 50 MG AND SENNOSIDES 8.6 MG 2 TABLET: 8.6; 5 TABLET, FILM COATED ORAL at 17:22

## 2020-05-02 RX ADMIN — DIPHENHYDRAMINE HYDROCHLORIDE 25 MG: 25 TABLET ORAL at 10:36

## 2020-05-02 RX ADMIN — OXYCODONE 10 MG: 5 TABLET ORAL at 14:22

## 2020-05-02 RX ADMIN — LINEZOLID 600 MG: 600 TABLET, FILM COATED ORAL at 17:22

## 2020-05-02 RX ADMIN — ATORVASTATIN CALCIUM 40 MG: 40 TABLET, FILM COATED ORAL at 05:55

## 2020-05-02 RX ADMIN — OXYCODONE 10 MG: 5 TABLET ORAL at 05:55

## 2020-05-02 RX ADMIN — SERTRALINE HYDROCHLORIDE 50 MG: 50 TABLET ORAL at 05:05

## 2020-05-02 RX ADMIN — FERROUS GLUCONATE 324 MG: 324 TABLET ORAL at 08:08

## 2020-05-02 RX ADMIN — AMLODIPINE BESYLATE 2.5 MG: 5 TABLET ORAL at 08:09

## 2020-05-02 RX ADMIN — DOCUSATE SODIUM 50 MG AND SENNOSIDES 8.6 MG 2 TABLET: 8.6; 5 TABLET, FILM COATED ORAL at 05:05

## 2020-05-02 RX ADMIN — PIPERACILLIN AND TAZOBACTAM 4.5 G: 4; .5 INJECTION, POWDER, LYOPHILIZED, FOR SOLUTION INTRAVENOUS; PARENTERAL at 21:20

## 2020-05-02 ASSESSMENT — FIBROSIS 4 INDEX: FIB4 SCORE: 1.38

## 2020-05-02 ASSESSMENT — ENCOUNTER SYMPTOMS
MYALGIAS: 1
HEADACHES: 0
CHILLS: 0
BLURRED VISION: 0
DIZZINESS: 0
DIARRHEA: 0
DIAPHORESIS: 0
PHOTOPHOBIA: 0
SHORTNESS OF BREATH: 0
SPUTUM PRODUCTION: 0
TREMORS: 0
MYALGIAS: 0
FEVER: 0
ORTHOPNEA: 0
ABDOMINAL PAIN: 0
VOMITING: 0
CONSTIPATION: 0
HEARTBURN: 0
WEIGHT LOSS: 0
HEMOPTYSIS: 0
NECK PAIN: 0
NAUSEA: 0
FOCAL WEAKNESS: 0

## 2020-05-02 NOTE — CARE PLAN
Problem: Bowel/Gastric:  Goal: Normal bowel function is maintained or improved  Outcome: PROGRESSING AS EXPECTED  Note: Pt had bm today.      Problem: Mobility  Goal: Risk for activity intolerance will decrease  Outcome: PROGRESSING AS EXPECTED   Note: Pt SBA w/walker. Encouraged pt to walk around and sit in chair, but pt declined.

## 2020-05-02 NOTE — PROGRESS NOTES
Infectious Disease Progress Note    Author: Mayte Fuller M.D. Date & Time of service: 2020  12:53 PM    Chief Complaint:  Follow-up for necrotizing LLE infection/cellulitis    Interval History:  64-year-old male with methamphetamine abuse, hepatitis C status post treatment, bilateral lower extremity venous stasis dermatitis with ulcerations, admitted 2020 with left leg cellulitis and fever   afebrile, white count 10.2, tolerating Zyvox and Zosyn.  Reports feeling better overall.   afebrile, white count 7.6, tolerating antibiotics with no new issues.   afebrile WBC 8.3 plan for repeat I&D today, denies pain, no new clinical issues   AF WBC 14.3 S/p repeat I&D yesterday. Wound vac removed given excessive bloody drainage. Rate pain 6-7/10 in severity.   AF WBC 7.5 had blood transfusion over night and feeling better. No new complaints. LLE dressed without any bloody drainage   AF WBC 7.3 LLE dressed-pain controlled States plan for skin graft possibly Mon. No new complaints   AF WBC 8 no new complaints-tolerated VAC change yesterday. Denies SE abx  Labs Reviewed and Medications Reviewed.    Review of Systems:  Review of Systems   Constitutional: Negative for chills, diaphoresis, fever and weight loss.   Respiratory: Negative for shortness of breath.    Gastrointestinal: Negative for abdominal pain, constipation, diarrhea, nausea and vomiting.   Genitourinary: Negative for dysuria.   Musculoskeletal: Positive for myalgias.   Skin: Negative for itching and rash.   Neurological: Negative for dizziness, focal weakness and headaches.   All other systems reviewed and are negative.      Hemodynamics:  Temp (24hrs), Av.8 °C (98.3 °F), Min:36.6 °C (97.8 °F), Max:36.9 °C (98.5 °F)  Temperature: 36.6 °C (97.8 °F)  Pulse  Av.6  Min: 54  Max: 127   Blood Pressure: 136/88       Physical Exam:  Physical Exam  Vitals signs and nursing note reviewed.   Constitutional:       General: He  is not in acute distress.     Appearance: He is not ill-appearing, toxic-appearing or diaphoretic.      Comments: disheveled   HENT:      Nose: No rhinorrhea.      Mouth/Throat:      Pharynx: No oropharyngeal exudate.      Comments: Poor dentition  Eyes:      General: No scleral icterus.     Pupils: Pupils are equal, round, and reactive to light.   Neck:      Musculoskeletal: Neck supple. No muscular tenderness.   Cardiovascular:      Rate and Rhythm: Normal rate and regular rhythm.      Heart sounds: No murmur.   Pulmonary:      Effort: Pulmonary effort is normal. No respiratory distress.      Breath sounds: No stridor.   Abdominal:      General: Abdomen is flat. There is no distension.      Palpations: Abdomen is soft.      Tenderness: There is no abdominal tenderness.   Musculoskeletal:         General: Swelling present.      Comments: Left lower extremity dressed  Wound pictures reviewed  Wound 23 cm X 7 cm, no slough, dark blood   Skin:     General: Skin is warm.      Coloration: Skin is not jaundiced.      Findings: No rash.   Neurological:      General: No focal deficit present.      Mental Status: He is alert and oriented to person, place, and time.   Psychiatric:         Mood and Affect: Mood normal.         Behavior: Behavior normal.      Comments: Pleasant         Meds:    Current Facility-Administered Medications:   •  amLODIPine  •  diphenhydrAMINE  •  linezolid  •  senna-docusate  •  sertraline  •  polyethylene glycol/lytes  •  magnesium hydroxide  •  [COMPLETED] piperacillin-tazobactam **AND** piperacillin-tazobactam  •  atorvastatin  •  ferrous gluconate  •  acetaminophen  •  ondansetron  •  ondansetron  •  promethazine  •  promethazine  •  prochlorperazine  •  hydrALAZINE  •  oxyCODONE immediate-release  •  morphine injection    Labs:  Recent Labs     04/30/20  0238 05/01/20  0025 05/01/20  1102 05/02/20  0049   WBC 7.5 7.3  --  8.0   RBC 2.46* 2.34*  --  2.50*   HEMOGLOBIN 7.6* 7.1* 7.3* 7.7*      HEMATOCRIT 24.5* 23.1* 23.4* 24.9*   MCV 99.6* 98.7*  --  99.6*   MCH 30.9 30.3  --  30.8   RDW 55.7* 56.4*  --  55.7*   PLATELETCT 242 246  --  228   MPV 8.4* 8.2*  --  8.4*   NEUTSPOLYS 70.70 69.90  --  72.70*   LYMPHOCYTES 17.10* 16.40*  --  15.70*   MONOCYTES 9.20 10.40  --  8.50   EOSINOPHILS 1.70 1.90  --  1.90   BASOPHILS 0.80 1.00  --  0.80     Recent Labs     04/30/20  0238 05/01/20  0025   SODIUM 136 137   POTASSIUM 5.2 4.8   CHLORIDE 103 103   CO2 26 24   GLUCOSE 93 98   BUN 23* 22     Recent Labs     04/30/20  0238 05/01/20  0025   CREATININE 1.49* 1.26       Imaging:  Ct-extremity, Lower With Left    Result Date: 4/21/2020 4/21/2020 5:16 PM HISTORY/REASON FOR EXAM:  Lower leg pain, suspected osteomyelitis. TECHNIQUE/EXAM DESCRIPTION AND NUMBER OF VIEWS:  CT scan of the LEFT lower extremity with contrast, with reconstructions. Thin helical 3 mm sections were obtained from the distal femur through the proximal tibia/fibula. Sagittal and coronal multiplanar reconstructions were generated from the axial images. A total of 100 mL of Omnipaque 350 nonionic contrast was administered  IV without complication. Up to date radiation dose reduction adjustments have been utilized to meet ALARA standards for radiation dose reduction. COMPARISON: 4/16/2020. FINDINGS: No acute fracture or dislocation is identified. Cellulitis of the lower leg, with soft tissue prominence and subcutaneous air. No subjacent cortical bone destruction to suggest osteomyelitis. No drainable abscess. There is chronic appearing valgus deviation of the foot at the subtalar joint, and there is suspected pes planovalgus. Moderate subtalar osteoarthrosis. Multifocal mild to moderate osteoarthrosis in the midfoot. Mild multifocal interphalangeal osteoarthrosis. Reactive popliteal lymphadenopathy.     1. No evidence of osteomyelitis. 2. Cellulitis and soft tissue organs of the left lower leg. Subcutaneous air without drainable abscess. 3.  Suspected pes planovalgus deformity. Multifocal osteoarthrosis of the midfoot.    Ct-extremity, Lower With Left    Result Date: 4/16/2020 4/16/2020 1:45 PM HISTORY/REASON FOR EXAM:  Lower leg swelling/redness, cellulitis suspected. Left lower extremity edema and redness TECHNIQUE/EXAM DESCRIPTION AND NUMBER OF VIEWS:  CT scan of the LEFT lower extremity with contrast, with reconstructions. Thin helical 3 mm sections were obtained from the distal femur through the proximal tibia/fibula. Sagittal and coronal multiplanar reconstructions were generated from the axial images. A total of 100 mL of Omnipaque 350 nonionic contrast was administered  IV without complication. Up to date radiation dose reduction adjustments have been utilized to meet ALARA standards for radiation dose reduction. COMPARISON: 12/26/2019 FINDINGS: There is diffuse subcutaneous edema in the lower leg. The overlying skin is markedly thickened. There is curvilinear fluid deep to the fascia, but superficial to the gastrocnemius muscle extending from the knee joint to the ankle. Edema extends into the deeper fascial planes. No soft tissue gas is identified. No definite muscle necrosis is identified. No thrombus is identified. The Achilles tendon is intact. The bones are osteopenic. No cortical destruction is identified.     1.  Extensive subcutaneous edema with overlying skin thickening. Inflammation extends along the fascial planes with curvilinear fluid superficial to the gastrocnemius muscle. No soft tissue gas to suggest necrotizing fasciitis. 2.  Osteopenia    Us-renal    Result Date: 4/19/2020 4/19/2020 10:53 AM HISTORY/REASON FOR EXAM:  Acute renal failure TECHNIQUE/EXAM DESCRIPTION: Renal ultrasound. COMPARISON:  08/15/2018 FINDINGS: The right kidney measures 8.11 cm. The left kidney measures 9.17 cm. There is no hydronephrosis. There are no abnormal calcifications. Small right pleural effusion is identified. The bladder demonstrates no focal  wall abnormality.     1.  Right kidney appears smaller than left kidney which could be due to atrophy. 2.  No hydronephrosis. 3.  Incidentally noted small right pleural effusion.      Micro:  Results     Procedure Component Value Units Date/Time    CULTURE WOUND W/ GRAM STAIN [[id number]]  (Abnormal)  (Susceptibility) Collected:  04/22/20 1203    Order Status:  Completed Specimen:  Wound Updated:  04/25/20 1510     Significant Indicator POS     Source WND     Site Left Leg     Culture Result -     Gram Stain Result Moderate WBCs.  No organisms seen.       Culture Result Pseudomonas aeruginosa  Light growth  P.aeruginosa may develop resistance during prolonged therapy  with all antibiotics. Isolates that are initially susceptible  may become resistant within three to four days after  initiation of therapy. Testing of repeat isolates may be  warranted.        Beta Hemolytic Streptococcus group A  Rare growth      Narrative:       CALL  Rivas  [facility] tel. [phone],  CALLED  [facility] tel. [phone] 04/24/2020, 11:06, RB PERF. RESULTS CALLED  TO:[id number]  Surgery - swabs received    Susceptibility     Pseudomonas aeruginosa (1)     Antibiotic Interpretation Microscan Method Status    Ceftazidime Sensitive <=1 mcg/mL SHANON Final    Ciprofloxacin Resistant >2 mcg/mL SHANON Final    Cefepime Sensitive 4 mcg/mL SHANON Final    Amikacin Sensitive <=16 mcg/mL SHANON Final    Gentamicin Sensitive <=4 mcg/mL SHANON Final    Tobramycin Sensitive <=4 mcg/mL SHANON Final    Meropenem Sensitive <=1 mcg/mL SHANON Final    Pip/Tazobactam Sensitive 64 mcg/mL SHANON Final                   Anaerobic Culture [[id number]] Collected:  04/22/20 1203    Order Status:  Completed Specimen:  Wound Updated:  04/25/20 1510     Significant Indicator NEG     Source WND     Site Left Leg     Culture Result No Anaerobes isolated.    Narrative:       CALL  Rivas  [facility] tel. [phone],  CALLED  [facility] tel. [phone] 04/24/2020, 11:06, RB PERF. RESULTS CALLED  TO:[id number]  Surgery - swabs  received          Assessment/Plan:  Left lower extremity cellulitis, complicated  Chronic venous stasis   Afebrile  Resolved leukocytosis  CT of the left lower extremity on 4/21 with no osteomyelitis or drainable abscess  s/p I&D on 4/22 down to muscle  S/p repeat I and D on 4/24 for repeat I&D of mult compartments and placement of wound VAC: skin at the margins necrotic  S/p repeat I&D and [place] change on 4/27 by Dr. John  Blood culture negative  Wound cultures on 4/22 +GAS and MRSA  Intraoperative wound cx + pseudomonas aeruginosa and GAS  Plan for repeat I&D with skin graft on Monday  Continue linezolid and IV zosyn for now  Monitor CBC while on linezolid  Anticipated stop date abx 5/10/2020 if no further complications-    Methamphetamine abuse  Not a candidate for OPIC    Hepatitis C   status post treatment  No additional treatment at this time    LUPE, improved  Avoiding nephrotoxic agents      Prognosis for limb salvage guarded

## 2020-05-02 NOTE — PROGRESS NOTES
"[facility] Medicine Daily Progress Note    Date of Service  5/2/2020    Chief Complaint  64 y.o. male admitted 4/16/2020 with Left leg redness and swelling.    Hospital Course    64 y.o. male who presented 4/16/2020 with left leg redness and swelling.  Patient has history of venous stasis dermatitis of both legs, and venous stasis ulcers of the left leg.  Patient states that he was discharged from wound care a month ago and said the ulcers had resolved.  But then a week ago he noted that ulcers came back he also noticed increasing redness and swelling and pain.  He denied having any fever or chills, cough or congestion, shortness of breath, abdominal pain, nausea or vomiting, or diarrhea.  On review of his previous wound cultures, he has history of MRSA, Pseudomonas, Serratia, and group B streptococcus.    Cultures on this admissioon grew MRSA and GAS. ID has been following his care. He was eventually transitioned to oral Zyvox. He was improving. His WBCs normalized, he remained afebrile, and he was looking stable for discharge home with antibiotics and wound care. However, after wound care changed the dressing 4/21, mian pus was noted from a tunneling lesion, suggesting need for further I&D.  Patient was taken to the operating room by Dr. Israel.  Initial reports from healthcare personnel suggest there was some component of \"necrotizing fat\".  Antibiotics were broadened slightly to include Unasyn.  The case was discussed with infectious disease who will be following closely.    Patient's undergone multiple washout procedures.  Cultures from deep tissue in the operating room have grown Pseudomonas and GAS.  Antibiotics have been extended accordingly and he is receiving Zyvox and Zosyn.    Interval Problem Update  Patient is resting in bed, no new complaints, denies any dizziness lightheadedness shortness of breath, no more bleeding from his leg, will VAC is in place not causing any problem, he complained of mild " itchiness in his left lower extremity.    Consultants/Specialty  ID  LPS  Ortho    Code Status  full    Disposition  To be determined    Review of Systems  Review of Systems   Constitutional: Negative for chills.   HENT: Negative for ear discharge, ear pain and nosebleeds.    Eyes: Negative for blurred vision and photophobia.   Respiratory: Negative for hemoptysis and sputum production.    Cardiovascular: Negative for chest pain and orthopnea.   Gastrointestinal: Negative for abdominal pain, heartburn and vomiting.   Genitourinary: Negative for dysuria and hematuria.   Musculoskeletal: Negative for myalgias and neck pain.        Left leg pain   Skin: Negative for itching.   Neurological: Negative for dizziness, tremors and headaches.        Physical Exam  Temp:  [36.6 °C (97.8 °F)-36.9 °C (98.5 °F)] 36.6 °C (97.8 °F)  Pulse:  [63-70] 63  Resp:  [16-19] 16  BP: (136-157)/(83-90) 136/88  SpO2:  [90 %-95 %] 95 %    Physical Exam  Vitals signs and nursing note reviewed.   Constitutional:       Interventions: Face mask in place.   HENT:      Head: Normocephalic and atraumatic.      Nose: Nose normal.   Eyes:      General: No scleral icterus.     Pupils: Pupils are equal, round, and reactive to light.   Neck:      Musculoskeletal: Neck supple.   Cardiovascular:      Rate and Rhythm: Normal rate and regular rhythm.      Heart sounds: No friction rub. No S3 or S4 sounds.    Pulmonary:      Effort: No respiratory distress.      Breath sounds: No wheezing.   Abdominal:      General: Bowel sounds are normal. There is no distension.      Palpations: Abdomen is soft.      Tenderness: There is no abdominal tenderness.   Musculoskeletal: Normal range of motion.      Right lower leg: No edema.      Left lower leg: No edema.      Comments: Left lower extremity with bandage in place.  No bleeding  Wound VAC in place   Skin:     General: Skin is warm and dry.   Neurological:      General: No focal deficit present.      Mental Status:  He is alert and oriented to person, place, and time.      Cranial Nerves: No cranial nerve deficit.      Motor: No weakness.   Psychiatric:         Mood and Affect: Mood normal.         Behavior: Behavior normal.         Fluids    Intake/Output Summary (Last 24 hours) at 5/2/2020 1012  Last data filed at 5/2/2020 1000  Gross per 24 hour   Intake 1080 ml   Output 750 ml   Net 330 ml       Laboratory  Recent Labs     04/30/20  0238 05/01/20  0025 05/01/20  1102 05/02/20  0049   WBC 7.5 7.3  --  8.0   RBC 2.46* 2.34*  --  2.50*   HEMOGLOBIN 7.6* 7.1* 7.3* 7.7*   HEMATOCRIT 24.5* 23.1* 23.4* 24.9*   MCV 99.6* 98.7*  --  99.6*   MCH 30.9 30.3  --  30.8   MCHC 31.0* 30.7*  --  30.9*   RDW 55.7* 56.4*  --  55.7*   PLATELETCT 242 246  --  228   MPV 8.4* 8.2*  --  8.4*     Recent Labs     04/30/20  0238 05/01/20  0025   SODIUM 136 137   POTASSIUM 5.2 4.8   CHLORIDE 103 103   CO2 26 24   GLUCOSE 93 98   BUN 23* 22   CREATININE 1.49* 1.26   CALCIUM 7.7* 7.6*                   Imaging     Assessment/Plan  * Cellulitis of left lower extremity w tunneling abscess- (present on admission)  Assessment & Plan  Zyvox and Zosyn per ID  Zyvox termed 4/25 - reordered - duration per ID  Follow deep operative cultures  S/P washout 4/22 and 4/24  Cultures 4/22 thus far (+) for group A strep and Pseudomonas    -Arterial ultrasound on 12/27/2019 showed:No evidence of arterial insufficiency in either lower extremity  Continue antibiotics as per ID Zosyn and Zyvox  Anticipate 10-14 day course from 4/27/2020  Probably graft on 5/4/2020.       Venous stasis ulcer of left lower extremity (HCC)- (present on admission)  Assessment & Plan  S/p I&D on 4/27/2020, continue iv abx as per ID.  On Zyvox and Zosyn to complete 10 days of antibiotics from 4/27/2020  Probably graft on Monday, 5/4/2020    Hyperkalemia  Assessment & Plan  Upper limit of normal  Low K diet. Continue monitoring  Potassium improved    Venous stasis dermatitis of both lower  extremities- (present on admission)  Assessment & Plan  Wound care, wound vac removed due to bleeding, bandage in place. No more bleeding, LPS to reassess for wound vac placement   Hemoglobin dropped, he status post 1 unit of PRBC on 4/29/2020, hemoglobin improved to 7.7 continue monitoring.    CKD (chronic kidney disease) stage 3, GFR 30-59 ml/min ([facility])- (present on admission)  Assessment & Plan  Acute on chronic  Stable, continue monitoring  Creatinine improved    Essential hypertension- (present on admission)  Assessment & Plan  Started on low-dose amlodipine continue monitoring.    Constipation  Assessment & Plan  Continue bowel protocol    Normocytic anemia  Assessment & Plan  Continue monitoring, probably acute on chronic hemoglobin required 1 unit of PRBC, improved    Methamphetamine abuse ([facility])- (present on admission)  Assessment & Plan  Positive  UDS    Alcoholism ([facility])- (present on admission)  Assessment & Plan  Continue close monitoring, no signs of alcohol withdrawal, continue multivitamins.    Anxiety- (present on admission)  Assessment & Plan  Continue close monitoring       VTE prophylaxis: heparin if hb continue dropping will hold heparin. Start scd's in the non affected leg.     Case and plan of care was discussed with nurse staff  Case and plan of care discussed during multidisciplinary rounds

## 2020-05-02 NOTE — PROGRESS NOTES
Assumed care of pt at 0700. VSS, AAOX4. Wound vac in place. Skin graft to be done on Monday. Plan of care reviewed w/pt. Hourly rounding in place. Will continue to monitor.

## 2020-05-02 NOTE — CARE PLAN
Problem: Communication  Goal: The ability to communicate needs accurately and effectively will improve  Outcome: PROGRESSING AS EXPECTED  POC discussed with pt, pt is able to express his needs.     Problem: Pain Management  Goal: Pain level will decrease to patient's comfort goal  Outcome: PROGRESSING AS EXPECTED  Oxy PRN, pt requests when needed, pt reports pain is controlled.     Problem: Safety  Goal: Will remain free from falls  Outcome: PROGRESSING AS EXPECTED  Fall and safety precautions in place, bed alarm on, bed in low and locked position, fall education provided, call light within reach.

## 2020-05-02 NOTE — PROGRESS NOTES
Assumed care for NOC shift.. Pt is resting in bed, sitting upright in bed, pt denies current needs. Rates pain at 7/10, oxy available at 2130, pt verbalizes understanding. Pt assessed, no signs of distress. POC discussed.  Bed in lowest locked position, bed is alarmed, call light in reach. Safety precautions in place.

## 2020-05-03 LAB
ANION GAP SERPL CALC-SCNC: 12 MMOL/L (ref 7–16)
BASOPHILS # BLD AUTO: 0.9 % (ref 0–1.8)
BASOPHILS # BLD: 0.07 K/UL (ref 0–0.12)
BUN SERPL-MCNC: 17 MG/DL (ref 8–22)
CALCIUM SERPL-MCNC: 8.2 MG/DL (ref 8.5–10.5)
CHLORIDE SERPL-SCNC: 103 MMOL/L (ref 96–112)
CO2 SERPL-SCNC: 23 MMOL/L (ref 20–33)
CREAT SERPL-MCNC: 1.27 MG/DL (ref 0.5–1.4)
EOSINOPHIL # BLD AUTO: 0.14 K/UL (ref 0–0.51)
EOSINOPHIL NFR BLD: 1.8 % (ref 0–6.9)
ERYTHROCYTE [DISTWIDTH] IN BLOOD BY AUTOMATED COUNT: 53.8 FL (ref 35.9–50)
GLUCOSE SERPL-MCNC: 97 MG/DL (ref 65–99)
HCT VFR BLD AUTO: 24.4 % (ref 42–52)
HGB BLD-MCNC: 8 G/DL (ref 14–18)
IMM GRANULOCYTES # BLD AUTO: 0.03 K/UL (ref 0–0.11)
IMM GRANULOCYTES NFR BLD AUTO: 0.4 % (ref 0–0.9)
LYMPHOCYTES # BLD AUTO: 1.28 K/UL (ref 1–4.8)
LYMPHOCYTES NFR BLD: 16.1 % (ref 22–41)
MCH RBC QN AUTO: 32 PG (ref 27–33)
MCHC RBC AUTO-ENTMCNC: 32.8 G/DL (ref 33.7–35.3)
MCV RBC AUTO: 97.6 FL (ref 81.4–97.8)
MONOCYTES # BLD AUTO: 0.67 K/UL (ref 0–0.85)
MONOCYTES NFR BLD AUTO: 8.4 % (ref 0–13.4)
NEUTROPHILS # BLD AUTO: 5.78 K/UL (ref 1.82–7.42)
NEUTROPHILS NFR BLD: 72.4 % (ref 44–72)
NRBC # BLD AUTO: 0.02 K/UL
NRBC BLD-RTO: 0.3 /100 WBC
PLATELET # BLD AUTO: 220 K/UL (ref 164–446)
PMV BLD AUTO: 8.2 FL (ref 9–12.9)
POTASSIUM SERPL-SCNC: 5.1 MMOL/L (ref 3.6–5.5)
RBC # BLD AUTO: 2.5 M/UL (ref 4.7–6.1)
SODIUM SERPL-SCNC: 138 MMOL/L (ref 135–145)
WBC # BLD AUTO: 8 K/UL (ref 4.8–10.8)

## 2020-05-03 PROCEDURE — 36415 COLL VENOUS BLD VENIPUNCTURE: CPT

## 2020-05-03 PROCEDURE — 770021 HCHG ROOM/CARE - ISO PRIVATE

## 2020-05-03 PROCEDURE — 700111 HCHG RX REV CODE 636 W/ 250 OVERRIDE (IP): Performed by: HOSPITALIST

## 2020-05-03 PROCEDURE — 700102 HCHG RX REV CODE 250 W/ 637 OVERRIDE(OP): Performed by: NURSE PRACTITIONER

## 2020-05-03 PROCEDURE — 700102 HCHG RX REV CODE 250 W/ 637 OVERRIDE(OP): Performed by: FAMILY MEDICINE

## 2020-05-03 PROCEDURE — 700105 HCHG RX REV CODE 258: Performed by: HOSPITALIST

## 2020-05-03 PROCEDURE — 80048 BASIC METABOLIC PNL TOTAL CA: CPT

## 2020-05-03 PROCEDURE — 99232 SBSQ HOSP IP/OBS MODERATE 35: CPT | Performed by: INTERNAL MEDICINE

## 2020-05-03 PROCEDURE — A9270 NON-COVERED ITEM OR SERVICE: HCPCS | Performed by: FAMILY MEDICINE

## 2020-05-03 PROCEDURE — 99232 SBSQ HOSP IP/OBS MODERATE 35: CPT | Performed by: HOSPITALIST

## 2020-05-03 PROCEDURE — A9270 NON-COVERED ITEM OR SERVICE: HCPCS | Performed by: NURSE PRACTITIONER

## 2020-05-03 PROCEDURE — A9270 NON-COVERED ITEM OR SERVICE: HCPCS | Performed by: HOSPITALIST

## 2020-05-03 PROCEDURE — 700102 HCHG RX REV CODE 250 W/ 637 OVERRIDE(OP): Performed by: HOSPITALIST

## 2020-05-03 PROCEDURE — 85025 COMPLETE CBC W/AUTO DIFF WBC: CPT

## 2020-05-03 RX ORDER — AMLODIPINE BESYLATE 5 MG/1
2.5 TABLET ORAL ONCE
Status: COMPLETED | OUTPATIENT
Start: 2020-05-03 | End: 2020-05-03

## 2020-05-03 RX ORDER — AMLODIPINE BESYLATE 5 MG/1
5 TABLET ORAL
Status: DISCONTINUED | OUTPATIENT
Start: 2020-05-04 | End: 2020-05-04

## 2020-05-03 RX ADMIN — FERROUS GLUCONATE 324 MG: 324 TABLET ORAL at 08:40

## 2020-05-03 RX ADMIN — OXYCODONE 10 MG: 5 TABLET ORAL at 01:04

## 2020-05-03 RX ADMIN — ATORVASTATIN CALCIUM 40 MG: 40 TABLET, FILM COATED ORAL at 06:02

## 2020-05-03 RX ADMIN — PIPERACILLIN AND TAZOBACTAM 4.5 G: 4; .5 INJECTION, POWDER, LYOPHILIZED, FOR SOLUTION INTRAVENOUS; PARENTERAL at 20:16

## 2020-05-03 RX ADMIN — PIPERACILLIN AND TAZOBACTAM 4.5 G: 4; .5 INJECTION, POWDER, LYOPHILIZED, FOR SOLUTION INTRAVENOUS; PARENTERAL at 06:01

## 2020-05-03 RX ADMIN — LINEZOLID 600 MG: 600 TABLET, FILM COATED ORAL at 17:08

## 2020-05-03 RX ADMIN — OXYCODONE 10 MG: 5 TABLET ORAL at 12:00

## 2020-05-03 RX ADMIN — OXYCODONE 10 MG: 5 TABLET ORAL at 06:09

## 2020-05-03 RX ADMIN — OXYCODONE 10 MG: 5 TABLET ORAL at 17:08

## 2020-05-03 RX ADMIN — AMLODIPINE BESYLATE 2.5 MG: 5 TABLET ORAL at 06:02

## 2020-05-03 RX ADMIN — LINEZOLID 600 MG: 600 TABLET, FILM COATED ORAL at 06:02

## 2020-05-03 RX ADMIN — SERTRALINE HYDROCHLORIDE 50 MG: 50 TABLET ORAL at 06:02

## 2020-05-03 RX ADMIN — OXYCODONE 10 MG: 5 TABLET ORAL at 21:21

## 2020-05-03 RX ADMIN — DOCUSATE SODIUM 50 MG AND SENNOSIDES 8.6 MG 2 TABLET: 8.6; 5 TABLET, FILM COATED ORAL at 06:02

## 2020-05-03 RX ADMIN — AMLODIPINE BESYLATE 2.5 MG: 5 TABLET ORAL at 11:59

## 2020-05-03 RX ADMIN — PIPERACILLIN AND TAZOBACTAM 4.5 G: 4; .5 INJECTION, POWDER, LYOPHILIZED, FOR SOLUTION INTRAVENOUS; PARENTERAL at 12:36

## 2020-05-03 RX ADMIN — DOCUSATE SODIUM 50 MG AND SENNOSIDES 8.6 MG 2 TABLET: 8.6; 5 TABLET, FILM COATED ORAL at 17:08

## 2020-05-03 ASSESSMENT — ENCOUNTER SYMPTOMS
ABDOMINAL PAIN: 0
VOMITING: 0
NAUSEA: 0
SPUTUM PRODUCTION: 0
DIARRHEA: 0
SHORTNESS OF BREATH: 0
FEVER: 0
BLURRED VISION: 0
HEARTBURN: 0
MYALGIAS: 0
TREMORS: 0
MYALGIAS: 1
DIZZINESS: 0
DIAPHORESIS: 0
NECK PAIN: 0
ORTHOPNEA: 0
CHILLS: 0
DOUBLE VISION: 0
COUGH: 0
HEADACHES: 0
WEIGHT LOSS: 0

## 2020-05-03 NOTE — PROGRESS NOTES
Infectious Disease Progress Note    Author: Mayte Fuller M.D. Date & Time of service: 5/3/2020  12:56 PM    Chief Complaint:  Follow-up for necrotizing LLE infection/cellulitis    Interval History:  64-year-old male with methamphetamine abuse, hepatitis C status post treatment, bilateral lower extremity venous stasis dermatitis with ulcerations, admitted 2020 with left leg cellulitis and fever   afebrile, white count 10.2, tolerating Zyvox and Zosyn.  Reports feeling better overall.   afebrile, white count 7.6, tolerating antibiotics with no new issues.   afebrile WBC 8.3 plan for repeat I&D today, denies pain, no new clinical issues   AF WBC 14.3 S/p repeat I&D yesterday. Wound vac removed given excessive bloody drainage. Rate pain 6-7/10 in severity.   AF WBC 7.5 had blood transfusion over night and feeling better. No new complaints. LLE dressed without any bloody drainage   AF WBC 7.3 LLE dressed-pain controlled States plan for skin graft possibly Mon. No new complaints   AF WBC 8 no new complaints-tolerated VAC change yesterday. Denies SE abx  5/3 AF no new complaints-plan for OR tomorrow  Labs Reviewed and Medications Reviewed.    Review of Systems:  Review of Systems   Constitutional: Negative for chills, diaphoresis, fever and weight loss.   Respiratory: Negative for cough and shortness of breath.    Gastrointestinal: Negative for abdominal pain, diarrhea, nausea and vomiting.   Musculoskeletal: Positive for myalgias.   Skin: Negative for itching and rash.   Neurological: Negative for headaches.   All other systems reviewed and are negative.      Hemodynamics:  Temp (24hrs), Av.1 °C (98.7 °F), Min:36.7 °C (98 °F), Max:37.3 °C (99.2 °F)  Temperature: 36.9 °C (98.4 °F)  Pulse  Av  Min: 54  Max: 127   Blood Pressure: 155/84(Notified RN)       Physical Exam:  Physical Exam  Vitals signs and nursing note reviewed.   Constitutional:       General: He is not in acute  distress.     Appearance: He is not ill-appearing, toxic-appearing or diaphoretic.      Comments: disheveled   HENT:      Nose: No congestion or rhinorrhea.      Mouth/Throat:      Comments: Poor dentition  Eyes:      General:         Right eye: No discharge.         Left eye: No discharge.   Neck:      Musculoskeletal: Neck supple. No muscular tenderness.   Cardiovascular:      Rate and Rhythm: Normal rate and regular rhythm.      Heart sounds: No murmur.   Pulmonary:      Effort: Pulmonary effort is normal. No respiratory distress.      Breath sounds: No stridor.   Abdominal:      General: Abdomen is flat. There is no distension.      Palpations: Abdomen is soft.      Tenderness: There is no abdominal tenderness.   Musculoskeletal:         General: Swelling present.      Comments: Left lower extremity dressed  Wound pictures reviewed  Wound 23 cm X 7 cm, no slough, dark blood   Skin:     General: Skin is warm.      Coloration: Skin is not jaundiced.      Findings: No rash.   Neurological:      Comments: somnolent         Meds:    Current Facility-Administered Medications:   •  [START ON 5/4/2020] amLODIPine  •  diphenhydrAMINE  •  linezolid  •  senna-docusate  •  sertraline  •  polyethylene glycol/lytes  •  magnesium hydroxide  •  [COMPLETED] piperacillin-tazobactam **AND** piperacillin-tazobactam  •  atorvastatin  •  ferrous gluconate  •  acetaminophen  •  ondansetron  •  ondansetron  •  promethazine  •  promethazine  •  prochlorperazine  •  hydrALAZINE  •  oxyCODONE immediate-release  •  morphine injection    Labs:  Recent Labs     05/01/20  0025 05/01/20  1102 05/02/20  0049 05/03/20  0045   WBC 7.3  --  8.0 8.0   RBC 2.34*  --  2.50* 2.50*   HEMOGLOBIN 7.1* 7.3* 7.7* 8.0*   HEMATOCRIT 23.1* 23.4* 24.9* 24.4*   MCV 98.7*  --  99.6* 97.6   MCH 30.3  --  30.8 32.0   RDW 56.4*  --  55.7* 53.8*   PLATELETCT 246  --  228 220   MPV 8.2*  --  8.4* 8.2*   NEUTSPOLYS 69.90  --  72.70* 72.40*   LYMPHOCYTES 16.40*  --   15.70* 16.10*   MONOCYTES 10.40  --  8.50 8.40   EOSINOPHILS 1.90  --  1.90 1.80   BASOPHILS 1.00  --  0.80 0.90     Recent Labs     05/01/20  0025 05/03/20  0045   SODIUM 137 138   POTASSIUM 4.8 5.1   CHLORIDE 103 103   CO2 24 23   GLUCOSE 98 97   BUN 22 17     Recent Labs     05/01/20  0025 05/03/20  0045   CREATININE 1.26 1.27       Imaging:  Ct-extremity, Lower With Left    Result Date: 4/21/2020 4/21/2020 5:16 PM HISTORY/REASON FOR EXAM:  Lower leg pain, suspected osteomyelitis. TECHNIQUE/EXAM DESCRIPTION AND NUMBER OF VIEWS:  CT scan of the LEFT lower extremity with contrast, with reconstructions. Thin helical 3 mm sections were obtained from the distal femur through the proximal tibia/fibula. Sagittal and coronal multiplanar reconstructions were generated from the axial images. A total of 100 mL of Omnipaque 350 nonionic contrast was administered  IV without complication. Up to date radiation dose reduction adjustments have been utilized to meet ALARA standards for radiation dose reduction. COMPARISON: 4/16/2020. FINDINGS: No acute fracture or dislocation is identified. Cellulitis of the lower leg, with soft tissue prominence and subcutaneous air. No subjacent cortical bone destruction to suggest osteomyelitis. No drainable abscess. There is chronic appearing valgus deviation of the foot at the subtalar joint, and there is suspected pes planovalgus. Moderate subtalar osteoarthrosis. Multifocal mild to moderate osteoarthrosis in the midfoot. Mild multifocal interphalangeal osteoarthrosis. Reactive popliteal lymphadenopathy.     1. No evidence of osteomyelitis. 2. Cellulitis and soft tissue organs of the left lower leg. Subcutaneous air without drainable abscess. 3. Suspected pes planovalgus deformity. Multifocal osteoarthrosis of the midfoot.    Ct-extremity, Lower With Left    Result Date: 4/16/2020 4/16/2020 1:45 PM HISTORY/REASON FOR EXAM:  Lower leg swelling/redness, cellulitis suspected. Left lower  extremity edema and redness TECHNIQUE/EXAM DESCRIPTION AND NUMBER OF VIEWS:  CT scan of the LEFT lower extremity with contrast, with reconstructions. Thin helical 3 mm sections were obtained from the distal femur through the proximal tibia/fibula. Sagittal and coronal multiplanar reconstructions were generated from the axial images. A total of 100 mL of Omnipaque 350 nonionic contrast was administered  IV without complication. Up to date radiation dose reduction adjustments have been utilized to meet ALARA standards for radiation dose reduction. COMPARISON: 12/26/2019 FINDINGS: There is diffuse subcutaneous edema in the lower leg. The overlying skin is markedly thickened. There is curvilinear fluid deep to the fascia, but superficial to the gastrocnemius muscle extending from the knee joint to the ankle. Edema extends into the deeper fascial planes. No soft tissue gas is identified. No definite muscle necrosis is identified. No thrombus is identified. The Achilles tendon is intact. The bones are osteopenic. No cortical destruction is identified.     1.  Extensive subcutaneous edema with overlying skin thickening. Inflammation extends along the fascial planes with curvilinear fluid superficial to the gastrocnemius muscle. No soft tissue gas to suggest necrotizing fasciitis. 2.  Osteopenia    Us-renal    Result Date: 4/19/2020 4/19/2020 10:53 AM HISTORY/REASON FOR EXAM:  Acute renal failure TECHNIQUE/EXAM DESCRIPTION: Renal ultrasound. COMPARISON:  08/15/2018 FINDINGS: The right kidney measures 8.11 cm. The left kidney measures 9.17 cm. There is no hydronephrosis. There are no abnormal calcifications. Small right pleural effusion is identified. The bladder demonstrates no focal wall abnormality.     1.  Right kidney appears smaller than left kidney which could be due to atrophy. 2.  No hydronephrosis. 3.  Incidentally noted small right pleural effusion.      Micro:  Results     ** No results found for the last 168  hours. **          Assessment/Plan:  Left lower extremity cellulitis, complicated  Chronic venous stasis   Afebrile  Resolved leukocytosis  CT of the left lower extremity on 4/21 with no osteomyelitis or drainable abscess  s/p I&D on 4/22 down to muscle  S/p repeat I and D on 4/24 for repeat I&D of mult compartments and placement of wound VAC: skin at the margins necrotic  S/p repeat I&D and W[place] change on 4/27 by Dr. John  Blood culture negative  Wound cultures on 4/22 +GAS and MRSA  Intraoperative wound cx + pseudomonas aeruginosa and GAS  Plan for repeat I&D with skin graft on 5/4/2020  Continue linezolid and IV zosyn for now  Monitor CBC while on linezolid  Anticipated stop date abx 5/10/2020 if no further complications-    Methamphetamine abuse  Not a candidate for [facility]IC    Hepatitis C   status post treatment  No additional treatment at this time    LUPE, improved  Avoiding nephrotoxic agents      Prognosis for limb salvage guarded

## 2020-05-03 NOTE — PROGRESS NOTES
Received bedside report from Night RN. Pt awake and alert. Bed alarm in use. No complains of pain at this time. Discussed plan of care. Wound vac in place on left lower extremity and checked for performance. Continues on contact isolation.

## 2020-05-03 NOTE — CARE PLAN
Problem: Communication  Goal: The ability to communicate needs accurately and effectively will improve  Outcome: PROGRESSING AS EXPECTED  POC discussed, pt expresses his needs effectively, call appropriately.     Problem: Safety  Goal: Will remain free from falls  Outcome: PROGRESSING AS EXPECTED  Fall and safety precautions in place, bed is alarmed in low and locked position, call light within reach     Problem: Infection  Goal: Will remain free from infection  Outcome: PROGRESSING AS EXPECTED  Contact precautions in place, hand hygiene in use

## 2020-05-03 NOTE — PROGRESS NOTES
Assumed care for NOC shift. Pt is resting in bed, pt assessed, no signs of distress. Pin requesting pain meds, rates pain at 6/10 in LLE. POC discussed for this shift, pt has no further needs. Bed in lowest locked position, call light in reach. Safety precautions in place.

## 2020-05-03 NOTE — PROGRESS NOTES
"[facility] Medicine Daily Progress Note    Date of Service  5/3/2020    Chief Complaint  64 y.o. male admitted 4/16/2020 with Left leg redness and swelling.    Hospital Course    64 y.o. male who presented 4/16/2020 with left leg redness and swelling.  Patient has history of venous stasis dermatitis of both legs, and venous stasis ulcers of the left leg.  Patient states that he was discharged from wound care a month ago and said the ulcers had resolved.  But then a week ago he noted that ulcers came back he also noticed increasing redness and swelling and pain.  He denied having any fever or chills, cough or congestion, shortness of breath, abdominal pain, nausea or vomiting, or diarrhea.  On review of his previous wound cultures, he has history of MRSA, Pseudomonas, Serratia, and group B streptococcus.    Cultures on this admissioon grew MRSA and GAS. ID has been following his care. He was eventually transitioned to oral Zyvox. He was improving. His WBCs normalized, he remained afebrile, and he was looking stable for discharge home with antibiotics and wound care. However, after wound care changed the dressing 4/21, mian pus was noted from a tunneling lesion, suggesting need for further I&D.  Patient was taken to the operating room by Dr. Israel.  Initial reports from healthcare personnel suggest there was some component of \"necrotizing fat\".  Antibiotics were broadened slightly to include Unasyn.  The case was discussed with infectious disease who will be following closely.    Patient's undergone multiple washout procedures.  Cultures from deep tissue in the operating room have grown Pseudomonas and GAS.  Antibiotics have been extended accordingly and he is receiving Zyvox and Zosyn.    Interval Problem Update  Patient is resting in bed, no new complaints, no fever no chills no nausea no vomiting no abdominal pain, tolerating diet denies any dizziness " lightheadedness.    Consultants/Specialty  ID  LPS  Ortho    Code Status  full    Disposition  To be determined    Review of Systems  Review of Systems   Constitutional: Negative for fever.   HENT: Negative for ear discharge, ear pain and nosebleeds.    Eyes: Negative for blurred vision and double vision.   Respiratory: Negative for cough and sputum production.    Cardiovascular: Negative for chest pain and orthopnea.   Gastrointestinal: Negative for heartburn, nausea and vomiting.   Genitourinary: Negative for dysuria and urgency.   Musculoskeletal: Negative for myalgias and neck pain.        Left leg pain   Skin: Negative for itching.   Neurological: Negative for dizziness, tremors and headaches.        Physical Exam  Temp:  [36.7 °C (98 °F)-37.3 °C (99.2 °F)] 36.9 °C (98.4 °F)  Pulse:  [] 103  Resp:  [16-17] 17  BP: (145-155)/(83-96) 155/84  SpO2:  [91 %-93 %] 91 %    Physical Exam  Vitals signs and nursing note reviewed.   Constitutional:       Interventions: Face mask in place.   HENT:      Head: Normocephalic and atraumatic.      Nose: Nose normal.   Eyes:      General: No scleral icterus.     Pupils: Pupils are equal, round, and reactive to light.   Neck:      Musculoskeletal: Neck supple.   Cardiovascular:      Rate and Rhythm: Normal rate and regular rhythm.      Heart sounds: No friction rub. No S3 or S4 sounds.    Pulmonary:      Effort: No respiratory distress.      Breath sounds: No rales.   Abdominal:      General: Bowel sounds are normal. There is no distension.      Palpations: Abdomen is soft.      Tenderness: There is no guarding.   Musculoskeletal: Normal range of motion.      Right lower leg: No edema.      Left lower leg: No edema.      Comments: Left lower extremity with bandage in place.  No bleeding  Wound VAC in place   Skin:     General: Skin is warm and dry.   Neurological:      General: No focal deficit present.      Mental Status: He is alert and oriented to person, place, and  time.      Cranial Nerves: No cranial nerve deficit.      Motor: No weakness.   Psychiatric:         Mood and Affect: Mood normal.         Behavior: Behavior normal.         Fluids    Intake/Output Summary (Last 24 hours) at 5/3/2020 1211  Last data filed at 5/3/2020 0843  Gross per 24 hour   Intake 720 ml   Output 1900 ml   Net -1180 ml       Laboratory  Recent Labs     05/01/20  0025 05/01/20  1102 05/02/20  0049 05/03/20  0045   WBC 7.3  --  8.0 8.0   RBC 2.34*  --  2.50* 2.50*   HEMOGLOBIN 7.1* 7.3* 7.7* 8.0*   HEMATOCRIT 23.1* 23.4* 24.9* 24.4*   MCV 98.7*  --  99.6* 97.6   MCH 30.3  --  30.8 32.0   MCHC 30.7*  --  30.9* 32.8*   RDW 56.4*  --  55.7* 53.8*   PLATELETCT 246  --  228 220   MPV 8.2*  --  8.4* 8.2*     Recent Labs     05/01/20  0025 05/03/20  0045   SODIUM 137 138   POTASSIUM 4.8 5.1   CHLORIDE 103 103   CO2 24 23   GLUCOSE 98 97   BUN 22 17   CREATININE 1.26 1.27   CALCIUM 7.6* 8.2*                   Imaging     Assessment/Plan  * Cellulitis of left lower extremity w tunneling abscess- (present on admission)  Assessment & Plan  Zyvox and Zosyn per ID  Zyvox termed 4/25 - reordered - duration per ID  Follow deep operative cultures  S/P washout 4/22 and 4/24  Cultures 4/22 thus far (+) for group A strep and Pseudomonas    -Arterial ultrasound on 12/27/2019 showed:No evidence of arterial insufficiency in either lower extremity  Continue antibiotics as per ID Zosyn and Zyvox  Anticipate 10-14 day course from 4/27/2020  Probably graft on 5/4/2020.       Venous stasis ulcer of left lower extremity (HCC)- (present on admission)  Assessment & Plan  S/p I&D on 4/27/2020, continue iv abx as per ID.  On Zyvox and Zosyn to complete 10 days of antibiotics from 4/27/2020  Probably graft on Monday, 5/4/2020    Hyperkalemia  Assessment & Plan  Upper limit of normal  Low K diet. Continue monitoring  Potassium improved      Venous stasis dermatitis of both lower extremities- (present on admission)  Assessment &  Plan  Wound care, wound vac removed due to bleeding, bandage in place. No more bleeding, LPS to reassess for wound vac placement   Hemoglobin dropped, he status post 1 unit of PRBC on 4/29/2020, hemoglobin improving and trending up    CKD (chronic kidney disease) stage 3, GFR 30-59 ml/min ([facility])- (present on admission)  Assessment & Plan  Acute on chronic  Stable, continue monitoring  Creatinine improved.    Essential hypertension- (present on admission)  Assessment & Plan  Increased amlodipine to 5 mg daily    Constipation  Assessment & Plan  Continue bowel protocol    Normocytic anemia  Assessment & Plan  Continue monitoring, probably acute on chronic hemoglobin required 1 unit of PRBC, improved    Methamphetamine abuse ([facility])- (present on admission)  Assessment & Plan  Positive  UDS    Alcoholism ([facility])- (present on admission)  Assessment & Plan  Continue close monitoring, no signs of alcohol withdrawal, continue multivitamins.    Anxiety- (present on admission)  Assessment & Plan  Continue close monitoring       VTE prophylaxis: .  scd's in the non affected leg.     Case and plan of care was discussed with nurse staff  Case and plan of care discussed during multidisciplinary rounds

## 2020-05-04 ENCOUNTER — ANESTHESIA (OUTPATIENT)
Dept: SURGERY | Facility: MEDICAL CENTER | Age: 65
DRG: 571 | End: 2020-05-04
Payer: MEDICAID

## 2020-05-04 ENCOUNTER — ANESTHESIA EVENT (OUTPATIENT)
Dept: SURGERY | Facility: MEDICAL CENTER | Age: 65
DRG: 571 | End: 2020-05-04
Payer: MEDICAID

## 2020-05-04 LAB
BASOPHILS # BLD AUTO: 0.6 % (ref 0–1.8)
BASOPHILS # BLD: 0.05 K/UL (ref 0–0.12)
COVID ORDER STATUS COVID19: NORMAL
EOSINOPHIL # BLD AUTO: 0.12 K/UL (ref 0–0.51)
EOSINOPHIL NFR BLD: 1.5 % (ref 0–6.9)
ERYTHROCYTE [DISTWIDTH] IN BLOOD BY AUTOMATED COUNT: 51.8 FL (ref 35.9–50)
ERYTHROCYTE [SEDIMENTATION RATE] IN BLOOD BY WESTERGREN METHOD: 119 MM/HOUR (ref 0–20)
GLUCOSE BLD-MCNC: 90 MG/DL (ref 65–99)
HCT VFR BLD AUTO: 24.4 % (ref 42–52)
HGB BLD-MCNC: 8 G/DL (ref 14–18)
IMM GRANULOCYTES # BLD AUTO: 0.04 K/UL (ref 0–0.11)
IMM GRANULOCYTES NFR BLD AUTO: 0.5 % (ref 0–0.9)
LYMPHOCYTES # BLD AUTO: 0.94 K/UL (ref 1–4.8)
LYMPHOCYTES NFR BLD: 11.5 % (ref 22–41)
MCH RBC QN AUTO: 31.1 PG (ref 27–33)
MCHC RBC AUTO-ENTMCNC: 32.8 G/DL (ref 33.7–35.3)
MCV RBC AUTO: 94.9 FL (ref 81.4–97.8)
MONOCYTES # BLD AUTO: 0.68 K/UL (ref 0–0.85)
MONOCYTES NFR BLD AUTO: 8.3 % (ref 0–13.4)
NEUTROPHILS # BLD AUTO: 6.32 K/UL (ref 1.82–7.42)
NEUTROPHILS NFR BLD: 77.6 % (ref 44–72)
NRBC # BLD AUTO: 0 K/UL
NRBC BLD-RTO: 0 /100 WBC
PLATELET # BLD AUTO: 213 K/UL (ref 164–446)
PMV BLD AUTO: 8.2 FL (ref 9–12.9)
RBC # BLD AUTO: 2.57 M/UL (ref 4.7–6.1)
SARS-COV-2 RNA RESP QL NAA+PROBE: NOTDETECTED
SPECIMEN SOURCE: NORMAL
WBC # BLD AUTO: 8.2 K/UL (ref 4.8–10.8)

## 2020-05-04 PROCEDURE — 160036 HCHG PACU - EA ADDL 30 MINS PHASE I: Performed by: ORTHOPAEDIC SURGERY

## 2020-05-04 PROCEDURE — 160002 HCHG RECOVERY MINUTES (STAT): Performed by: ORTHOPAEDIC SURGERY

## 2020-05-04 PROCEDURE — 500054 HCHG BANDAGE, ELASTIC 6: Performed by: ORTHOPAEDIC SURGERY

## 2020-05-04 PROCEDURE — 700111 HCHG RX REV CODE 636 W/ 250 OVERRIDE (IP): Performed by: FAMILY MEDICINE

## 2020-05-04 PROCEDURE — 85652 RBC SED RATE AUTOMATED: CPT

## 2020-05-04 PROCEDURE — 700102 HCHG RX REV CODE 250 W/ 637 OVERRIDE(OP): Performed by: HOSPITALIST

## 2020-05-04 PROCEDURE — 0JBP0ZZ EXCISION OF LEFT LOWER LEG SUBCUTANEOUS TISSUE AND FASCIA, OPEN APPROACH: ICD-10-PCS | Performed by: ORTHOPAEDIC SURGERY

## 2020-05-04 PROCEDURE — 700102 HCHG RX REV CODE 250 W/ 637 OVERRIDE(OP)

## 2020-05-04 PROCEDURE — 700111 HCHG RX REV CODE 636 W/ 250 OVERRIDE (IP): Performed by: ANESTHESIOLOGY

## 2020-05-04 PROCEDURE — 160009 HCHG ANES TIME/MIN: Performed by: ORTHOPAEDIC SURGERY

## 2020-05-04 PROCEDURE — 0J9R0ZZ DRAINAGE OF LEFT FOOT SUBCUTANEOUS TISSUE AND FASCIA, OPEN APPROACH: ICD-10-PCS | Performed by: ORTHOPAEDIC SURGERY

## 2020-05-04 PROCEDURE — 700102 HCHG RX REV CODE 250 W/ 637 OVERRIDE(OP): Performed by: FAMILY MEDICINE

## 2020-05-04 PROCEDURE — 160027 HCHG SURGERY MINUTES - 1ST 30 MINS LEVEL 2: Performed by: ORTHOPAEDIC SURGERY

## 2020-05-04 PROCEDURE — 700111 HCHG RX REV CODE 636 W/ 250 OVERRIDE (IP): Performed by: HOSPITALIST

## 2020-05-04 PROCEDURE — A9270 NON-COVERED ITEM OR SERVICE: HCPCS | Performed by: FAMILY MEDICINE

## 2020-05-04 PROCEDURE — 700111 HCHG RX REV CODE 636 W/ 250 OVERRIDE (IP)

## 2020-05-04 PROCEDURE — A9270 NON-COVERED ITEM OR SERVICE: HCPCS

## 2020-05-04 PROCEDURE — 160048 HCHG OR STATISTICAL LEVEL 1-5: Performed by: ORTHOPAEDIC SURGERY

## 2020-05-04 PROCEDURE — 500447 HCHG DRESSING, TEGADERM 8X12: Performed by: ORTHOPAEDIC SURGERY

## 2020-05-04 PROCEDURE — 700101 HCHG RX REV CODE 250: Performed by: ANESTHESIOLOGY

## 2020-05-04 PROCEDURE — 770021 HCHG ROOM/CARE - ISO PRIVATE

## 2020-05-04 PROCEDURE — 700101 HCHG RX REV CODE 250: Performed by: ORTHOPAEDIC SURGERY

## 2020-05-04 PROCEDURE — 501330 HCHG SET, CYSTO IRRIG TUBING: Performed by: ORTHOPAEDIC SURGERY

## 2020-05-04 PROCEDURE — 500423 HCHG DRESSING, ABD COMBINE: Performed by: ORTHOPAEDIC SURGERY

## 2020-05-04 PROCEDURE — 82962 GLUCOSE BLOOD TEST: CPT

## 2020-05-04 PROCEDURE — 99232 SBSQ HOSP IP/OBS MODERATE 35: CPT | Performed by: HOSPITALIST

## 2020-05-04 PROCEDURE — U0004 COV-19 TEST NON-CDC HGH THRU: HCPCS

## 2020-05-04 PROCEDURE — A6223 GAUZE >16<=48 NO W/SAL W/O B: HCPCS | Performed by: ORTHOPAEDIC SURGERY

## 2020-05-04 PROCEDURE — 501445 HCHG STAPLER, SKIN DISP: Performed by: ORTHOPAEDIC SURGERY

## 2020-05-04 PROCEDURE — 700105 HCHG RX REV CODE 258: Performed by: ANESTHESIOLOGY

## 2020-05-04 PROCEDURE — 501838 HCHG SUTURE GENERAL: Performed by: ORTHOPAEDIC SURGERY

## 2020-05-04 PROCEDURE — 99232 SBSQ HOSP IP/OBS MODERATE 35: CPT | Performed by: INTERNAL MEDICINE

## 2020-05-04 PROCEDURE — 500881 HCHG PACK, EXTREMITY: Performed by: ORTHOPAEDIC SURGERY

## 2020-05-04 PROCEDURE — 160038 HCHG SURGERY MINUTES - EA ADDL 1 MIN LEVEL 2: Performed by: ORTHOPAEDIC SURGERY

## 2020-05-04 PROCEDURE — 700105 HCHG RX REV CODE 258: Performed by: HOSPITALIST

## 2020-05-04 PROCEDURE — 36415 COLL VENOUS BLD VENIPUNCTURE: CPT

## 2020-05-04 PROCEDURE — A6454 SELF-ADHER BAND W>=3" <5"/YD: HCPCS | Performed by: ORTHOPAEDIC SURGERY

## 2020-05-04 PROCEDURE — A9270 NON-COVERED ITEM OR SERVICE: HCPCS | Performed by: HOSPITALIST

## 2020-05-04 PROCEDURE — 160035 HCHG PACU - 1ST 60 MINS PHASE I: Performed by: ORTHOPAEDIC SURGERY

## 2020-05-04 PROCEDURE — 85025 COMPLETE CBC W/AUTO DIFF WBC: CPT

## 2020-05-04 RX ORDER — MORPHINE SULFATE 4 MG/ML
2 INJECTION, SOLUTION INTRAMUSCULAR; INTRAVENOUS
Status: DISCONTINUED | OUTPATIENT
Start: 2020-05-04 | End: 2020-05-04 | Stop reason: HOSPADM

## 2020-05-04 RX ORDER — DIPHENHYDRAMINE HYDROCHLORIDE 50 MG/ML
12.5 INJECTION INTRAMUSCULAR; INTRAVENOUS
Status: DISCONTINUED | OUTPATIENT
Start: 2020-05-04 | End: 2020-05-04 | Stop reason: HOSPADM

## 2020-05-04 RX ORDER — MAGNESIUM HYDROXIDE 1200 MG/15ML
LIQUID ORAL
Status: COMPLETED | OUTPATIENT
Start: 2020-05-04 | End: 2020-05-04

## 2020-05-04 RX ORDER — AMLODIPINE BESYLATE 5 MG/1
2.5 TABLET ORAL ONCE
Status: COMPLETED | OUTPATIENT
Start: 2020-05-04 | End: 2020-05-04

## 2020-05-04 RX ORDER — SODIUM CHLORIDE, SODIUM LACTATE, POTASSIUM CHLORIDE, CALCIUM CHLORIDE 600; 310; 30; 20 MG/100ML; MG/100ML; MG/100ML; MG/100ML
INJECTION, SOLUTION INTRAVENOUS
Status: DISCONTINUED | OUTPATIENT
Start: 2020-05-04 | End: 2020-05-04 | Stop reason: SURG

## 2020-05-04 RX ORDER — PHENYLEPHRINE HCL IN 0.9% NACL 0.5 MG/5ML
SYRINGE (ML) INTRAVENOUS PRN
Status: DISCONTINUED | OUTPATIENT
Start: 2020-05-04 | End: 2020-05-04 | Stop reason: SURG

## 2020-05-04 RX ORDER — LABETALOL HYDROCHLORIDE 5 MG/ML
5 INJECTION, SOLUTION INTRAVENOUS
Status: DISCONTINUED | OUTPATIENT
Start: 2020-05-04 | End: 2020-05-04 | Stop reason: HOSPADM

## 2020-05-04 RX ORDER — GLYCOPYRROLATE 0.2 MG/ML
INJECTION INTRAMUSCULAR; INTRAVENOUS PRN
Status: DISCONTINUED | OUTPATIENT
Start: 2020-05-04 | End: 2020-05-04 | Stop reason: SURG

## 2020-05-04 RX ORDER — KETOROLAC TROMETHAMINE 30 MG/ML
INJECTION, SOLUTION INTRAMUSCULAR; INTRAVENOUS PRN
Status: DISCONTINUED | OUTPATIENT
Start: 2020-05-04 | End: 2020-05-04 | Stop reason: SURG

## 2020-05-04 RX ORDER — MORPHINE SULFATE 10 MG/ML
5 INJECTION, SOLUTION INTRAMUSCULAR; INTRAVENOUS
Status: DISCONTINUED | OUTPATIENT
Start: 2020-05-04 | End: 2020-05-04 | Stop reason: HOSPADM

## 2020-05-04 RX ORDER — SODIUM CHLORIDE, SODIUM LACTATE, POTASSIUM CHLORIDE, CALCIUM CHLORIDE 600; 310; 30; 20 MG/100ML; MG/100ML; MG/100ML; MG/100ML
INJECTION, SOLUTION INTRAVENOUS CONTINUOUS
Status: DISCONTINUED | OUTPATIENT
Start: 2020-05-04 | End: 2020-05-04 | Stop reason: HOSPADM

## 2020-05-04 RX ORDER — MEPERIDINE HYDROCHLORIDE 25 MG/ML
INJECTION INTRAMUSCULAR; INTRAVENOUS; SUBCUTANEOUS PRN
Status: DISCONTINUED | OUTPATIENT
Start: 2020-05-04 | End: 2020-05-04 | Stop reason: SURG

## 2020-05-04 RX ORDER — MORPHINE SULFATE 4 MG/ML
1 INJECTION, SOLUTION INTRAMUSCULAR; INTRAVENOUS
Status: DISCONTINUED | OUTPATIENT
Start: 2020-05-04 | End: 2020-05-04 | Stop reason: HOSPADM

## 2020-05-04 RX ORDER — MEPERIDINE HYDROCHLORIDE 25 MG/ML
12.5 INJECTION INTRAMUSCULAR; INTRAVENOUS; SUBCUTANEOUS
Status: DISCONTINUED | OUTPATIENT
Start: 2020-05-04 | End: 2020-05-04 | Stop reason: HOSPADM

## 2020-05-04 RX ORDER — AMLODIPINE BESYLATE 10 MG/1
10 TABLET ORAL
Status: DISCONTINUED | OUTPATIENT
Start: 2020-05-05 | End: 2020-05-21 | Stop reason: HOSPADM

## 2020-05-04 RX ORDER — OXYCODONE HCL 5 MG/5 ML
10 SOLUTION, ORAL ORAL
Status: COMPLETED | OUTPATIENT
Start: 2020-05-04 | End: 2020-05-04

## 2020-05-04 RX ORDER — OXYCODONE HCL 5 MG/5 ML
SOLUTION, ORAL ORAL
Status: COMPLETED
Start: 2020-05-04 | End: 2020-05-04

## 2020-05-04 RX ORDER — HALOPERIDOL 5 MG/ML
1 INJECTION INTRAMUSCULAR
Status: DISCONTINUED | OUTPATIENT
Start: 2020-05-04 | End: 2020-05-04 | Stop reason: HOSPADM

## 2020-05-04 RX ORDER — OXYCODONE HCL 5 MG/5 ML
5 SOLUTION, ORAL ORAL
Status: COMPLETED | OUTPATIENT
Start: 2020-05-04 | End: 2020-05-04

## 2020-05-04 RX ORDER — ONDANSETRON 2 MG/ML
4 INJECTION INTRAMUSCULAR; INTRAVENOUS
Status: DISCONTINUED | OUTPATIENT
Start: 2020-05-04 | End: 2020-05-04 | Stop reason: HOSPADM

## 2020-05-04 RX ORDER — HYDRALAZINE HYDROCHLORIDE 20 MG/ML
5 INJECTION INTRAMUSCULAR; INTRAVENOUS
Status: DISCONTINUED | OUTPATIENT
Start: 2020-05-04 | End: 2020-05-04 | Stop reason: HOSPADM

## 2020-05-04 RX ORDER — METOPROLOL TARTRATE 1 MG/ML
1 INJECTION, SOLUTION INTRAVENOUS
Status: DISCONTINUED | OUTPATIENT
Start: 2020-05-04 | End: 2020-05-04 | Stop reason: HOSPADM

## 2020-05-04 RX ADMIN — FERROUS GLUCONATE 324 MG: 324 TABLET ORAL at 09:44

## 2020-05-04 RX ADMIN — FENTANYL CITRATE 25 MCG: 50 INJECTION, SOLUTION INTRAMUSCULAR; INTRAVENOUS at 17:20

## 2020-05-04 RX ADMIN — PIPERACILLIN AND TAZOBACTAM 4.5 G: 4; .5 INJECTION, POWDER, LYOPHILIZED, FOR SOLUTION INTRAVENOUS; PARENTERAL at 20:49

## 2020-05-04 RX ADMIN — FENTANYL CITRATE 25 MCG: 50 INJECTION, SOLUTION INTRAMUSCULAR; INTRAVENOUS at 17:36

## 2020-05-04 RX ADMIN — Medication 50 MCG: at 17:24

## 2020-05-04 RX ADMIN — OXYCODONE 10 MG: 5 TABLET ORAL at 09:44

## 2020-05-04 RX ADMIN — OXYCODONE 10 MG: 5 TABLET ORAL at 14:21

## 2020-05-04 RX ADMIN — FENTANYL CITRATE 50 MCG: 50 INJECTION, SOLUTION INTRAMUSCULAR; INTRAVENOUS at 18:29

## 2020-05-04 RX ADMIN — MEPERIDINE HYDROCHLORIDE 25 MG: 25 INJECTION INTRAMUSCULAR; INTRAVENOUS; SUBCUTANEOUS at 17:49

## 2020-05-04 RX ADMIN — ONDANSETRON 4 MG: 2 INJECTION INTRAMUSCULAR; INTRAVENOUS at 17:49

## 2020-05-04 RX ADMIN — FENTANYL CITRATE 50 MCG: 50 INJECTION, SOLUTION INTRAMUSCULAR; INTRAVENOUS at 18:57

## 2020-05-04 RX ADMIN — EPHEDRINE SULFATE 5 MG: 50 INJECTION, SOLUTION INTRAVENOUS at 17:55

## 2020-05-04 RX ADMIN — FENTANYL CITRATE 25 MCG: 50 INJECTION, SOLUTION INTRAMUSCULAR; INTRAVENOUS at 18:49

## 2020-05-04 RX ADMIN — GLYCOPYRROLATE 0.2 MG: 0.2 INJECTION INTRAMUSCULAR; INTRAVENOUS at 17:28

## 2020-05-04 RX ADMIN — FENTANYL CITRATE 25 MCG: 50 INJECTION, SOLUTION INTRAMUSCULAR; INTRAVENOUS at 17:26

## 2020-05-04 RX ADMIN — DIPHENHYDRAMINE HYDROCHLORIDE 25 MG: 25 TABLET ORAL at 13:04

## 2020-05-04 RX ADMIN — FENTANYL CITRATE 25 MCG: 50 INJECTION, SOLUTION INTRAMUSCULAR; INTRAVENOUS at 17:17

## 2020-05-04 RX ADMIN — KETOROLAC TROMETHAMINE 15 MG: 30 INJECTION, SOLUTION INTRAMUSCULAR at 17:49

## 2020-05-04 RX ADMIN — SODIUM CHLORIDE, POTASSIUM CHLORIDE, SODIUM LACTATE AND CALCIUM CHLORIDE: 600; 310; 30; 20 INJECTION, SOLUTION INTRAVENOUS at 17:08

## 2020-05-04 RX ADMIN — OXYCODONE HYDROCHLORIDE 10 MG: 5 SOLUTION ORAL at 18:20

## 2020-05-04 RX ADMIN — PIPERACILLIN AND TAZOBACTAM 4.5 G: 4; .5 INJECTION, POWDER, LYOPHILIZED, FOR SOLUTION INTRAVENOUS; PARENTERAL at 14:21

## 2020-05-04 RX ADMIN — FENTANYL CITRATE 50 MCG: 50 INJECTION, SOLUTION INTRAMUSCULAR; INTRAVENOUS at 18:23

## 2020-05-04 RX ADMIN — Medication 10 MG: at 18:20

## 2020-05-04 RX ADMIN — PIPERACILLIN AND TAZOBACTAM 4.5 G: 4; .5 INJECTION, POWDER, LYOPHILIZED, FOR SOLUTION INTRAVENOUS; PARENTERAL at 05:31

## 2020-05-04 RX ADMIN — PROPOFOL 100 MG: 10 INJECTION, EMULSION INTRAVENOUS at 17:17

## 2020-05-04 RX ADMIN — FENTANYL CITRATE 25 MCG: 50 INJECTION, SOLUTION INTRAMUSCULAR; INTRAVENOUS at 18:46

## 2020-05-04 RX ADMIN — AMLODIPINE BESYLATE 2.5 MG: 5 TABLET ORAL at 14:20

## 2020-05-04 RX ADMIN — PROPOFOL 20 MG: 10 INJECTION, EMULSION INTRAVENOUS at 17:57

## 2020-05-04 RX ADMIN — Medication 100 MCG: at 17:34

## 2020-05-04 RX ADMIN — OXYCODONE 10 MG: 5 TABLET ORAL at 03:36

## 2020-05-04 ASSESSMENT — ENCOUNTER SYMPTOMS
NAUSEA: 0
DIZZINESS: 0
HEADACHES: 0
ORTHOPNEA: 0
FEVER: 0
HEARTBURN: 0
BLURRED VISION: 0
SPUTUM PRODUCTION: 0
MYALGIAS: 0
VOMITING: 0
TREMORS: 0
PHOTOPHOBIA: 0
NECK PAIN: 0
COUGH: 0

## 2020-05-04 ASSESSMENT — PAIN SCALES - GENERAL: PAIN_LEVEL: 5

## 2020-05-04 NOTE — PROGRESS NOTES
"[facility] Medicine Daily Progress Note    Date of Service  5/4/2020    Chief Complaint  64 y.o. male admitted 4/16/2020 with Left leg redness and swelling.    Hospital Course    64 y.o. male who presented 4/16/2020 with left leg redness and swelling.  Patient has history of venous stasis dermatitis of both legs, and venous stasis ulcers of the left leg.  Patient states that he was discharged from wound care a month ago and said the ulcers had resolved.  But then a week ago he noted that ulcers came back he also noticed increasing redness and swelling and pain.  He denied having any fever or chills, cough or congestion, shortness of breath, abdominal pain, nausea or vomiting, or diarrhea.  On review of his previous wound cultures, he has history of MRSA, Pseudomonas, Serratia, and group B streptococcus.    Cultures on this admissioon grew MRSA and GAS. ID has been following his care. He was eventually transitioned to oral Zyvox. He was improving. His WBCs normalized, he remained afebrile, and he was looking stable for discharge home with antibiotics and wound care. However, after wound care changed the dressing 4/21, mian pus was noted from a tunneling lesion, suggesting need for further I&D.  Patient was taken to the operating room by Dr. Israel.  Initial reports from healthcare personnel suggest there was some component of \"necrotizing fat\".  Antibiotics were broadened slightly to include Unasyn.  The case was discussed with infectious disease who will be following closely.    Patient's undergone multiple washout procedures.  Cultures from deep tissue in the operating room have grown Pseudomonas and GAS.  Antibiotics have been extended accordingly and he is receiving Zyvox and Zosyn.    Interval Problem Update  Patient is resting in bed, feels well, pain is stable, going for OR today for I&D and possible graft placement, his blood pressure is still is elevated will increase his amlodipine today, continue close " monitoring.  Denies any fever chills nausea vomiting.    Consultants/Specialty  ID  LPS  Ortho    Code Status  full    Disposition  To be determined    Review of Systems  Review of Systems   Constitutional: Negative for fever.   HENT: Negative for ear discharge, ear pain and nosebleeds.    Eyes: Negative for blurred vision and photophobia.   Respiratory: Negative for cough and sputum production.    Cardiovascular: Negative for chest pain and orthopnea.   Gastrointestinal: Negative for heartburn, nausea and vomiting.   Genitourinary: Negative for dysuria and frequency.   Musculoskeletal: Negative for myalgias and neck pain.        Left leg pain   Skin: Negative for itching.   Neurological: Negative for dizziness, tremors and headaches.        Physical Exam  Temp:  [36.7 °C (98.1 °F)-37.1 °C (98.8 °F)] 36.9 °C (98.5 °F)  Pulse:  [] 73  Resp:  [17-19] 19  BP: (148-169)/(81-95) 155/88  SpO2:  [91 %-97 %] 92 %    Physical Exam  Vitals signs and nursing note reviewed.   Constitutional:       Interventions: Face mask in place.   HENT:      Head: Normocephalic and atraumatic.      Nose: Nose normal.   Eyes:      General: No scleral icterus.     Pupils: Pupils are equal, round, and reactive to light.   Neck:      Musculoskeletal: Neck supple.   Cardiovascular:      Rate and Rhythm: Normal rate and regular rhythm.      Heart sounds: No friction rub. No S3 or S4 sounds.    Pulmonary:      Effort: No respiratory distress.      Breath sounds: No wheezing.   Abdominal:      General: Bowel sounds are normal. There is no distension.      Palpations: Abdomen is soft.      Tenderness: There is no abdominal tenderness.   Musculoskeletal: Normal range of motion.      Right lower leg: No edema.      Left lower leg: No edema.      Comments: Left lower extremity with bandage in place.  No bleeding  Wound VAC in place   Skin:     General: Skin is warm and dry.   Neurological:      General: No focal deficit present.      Mental  Status: He is alert and oriented to person, place, and time.      Cranial Nerves: No cranial nerve deficit.      Motor: No weakness.   Psychiatric:         Mood and Affect: Mood normal.         Behavior: Behavior normal.         Fluids    Intake/Output Summary (Last 24 hours) at 5/4/2020 1258  Last data filed at 5/4/2020 0854  Gross per 24 hour   Intake 100 ml   Output 2180 ml   Net -2080 ml       Laboratory  Recent Labs     05/02/20  0049 05/03/20  0045 05/04/20  0331   WBC 8.0 8.0 8.2   RBC 2.50* 2.50* 2.57*   HEMOGLOBIN 7.7* 8.0* 8.0*   HEMATOCRIT 24.9* 24.4* 24.4*   MCV 99.6* 97.6 94.9   MCH 30.8 32.0 31.1   MCHC 30.9* 32.8* 32.8*   RDW 55.7* 53.8* 51.8*   PLATELETCT 228 220 213   MPV 8.4* 8.2* 8.2*     Recent Labs     05/03/20  0045   SODIUM 138   POTASSIUM 5.1   CHLORIDE 103   CO2 23   GLUCOSE 97   BUN 17   CREATININE 1.27   CALCIUM 8.2*                   Imaging     Assessment/Plan  * Cellulitis of left lower extremity w tunneling abscess- (present on admission)  Assessment & Plan  Zyvox and Zosyn per ID  Zyvox termed 4/25 - reordered - duration per ID  Follow deep operative cultures  S/P washout 4/22 and 4/24  Cultures 4/22 thus far (+) for group A strep and Pseudomonas    -Arterial ultrasound on 12/27/2019 showed:No evidence of arterial insufficiency in either lower extremity  Continue antibiotics as per ID Zosyn and Zyvox  Anticipate 10-14 day course from 4/27/2020  Probably graft today     Venous stasis ulcer of left lower extremity (HCC)- (present on admission)  Assessment & Plan  S/p I&D on 4/27/2020, continue iv abx as per ID.  On Zyvox and Zosyn to complete 10 days of antibiotics from 4/27/2020  Probably graft today    Hyperkalemia  Assessment & Plan  Upper limit of normal  Low K diet. Continue monitoring  Potassium improved      Venous stasis dermatitis of both lower extremities- (present on admission)  Assessment & Plan  Wound care, wound vac removed due to bleeding, bandage in place. No more  bleeding, LPS to reassess for wound vac placement   Hemoglobin dropped, he status post 1 unit of PRBC on 4/29/2020, hemoglobin improving and trending up, stable    CKD (chronic kidney disease) stage 3, GFR 30-59 ml/min ([facility])- (present on admission)  Assessment & Plan  Acute on chronic  Stable, continue monitoring  Creatinine improved.    Essential hypertension- (present on admission)  Assessment & Plan  Increase amlodipine to 10 mg daily    Constipation  Assessment & Plan  Continue bowel protocol    Normocytic anemia  Assessment & Plan  Continue monitoring, probably acute on chronic hemoglobin required 1 unit of PRBC, improved    Methamphetamine abuse ([facility])- (present on admission)  Assessment & Plan  Positive  UDS    Alcoholism ([facility])- (present on admission)  Assessment & Plan  Continue close monitoring, no signs of alcohol withdrawal, continue multivitamins.    Anxiety- (present on admission)  Assessment & Plan  Continue close monitoring       VTE prophylaxis: .  scd's in the non affected leg.     Case and plan of care was discussed with nurse staff  Case and plan of care discussed during multidisciplinary rounds

## 2020-05-04 NOTE — PROGRESS NOTES
Infectious Disease Progress Note    Author: Mayte Fuller M.D. Date & Time of service: 2020  12:12 PM    Chief Complaint:  Follow-up for necrotizing LLE infection/cellulitis    Interval History:  64-year-old male with methamphetamine abuse, hepatitis C status post treatment, bilateral lower extremity venous stasis dermatitis with ulcerations, admitted 2020 with left leg cellulitis and fever   afebrile, white count 10.2, tolerating Zyvox and Zosyn.  Reports feeling better overall.   afebrile, white count 7.6, tolerating antibiotics with no new issues.   afebrile WBC 8.3 plan for repeat I&D today, denies pain, no new clinical issues   AF WBC 14.3 S/p repeat I&D yesterday. Wound vac removed given excessive bloody drainage. Rate pain 6-7/10 in severity.   AF WBC 7.5 had blood transfusion over night and feeling better. No new complaints. LLE dressed without any bloody drainage   AF WBC 7.3 LLE dressed-pain controlled States plan for skin graft possibly Mon. No new complaints   AF WBC 8 no new complaints-tolerated VAC change yesterday. Denies SE abx  5/3 AF no new complaints-plan for OR tomorrow   AF WBC 8.2-plan for OR still today-no time yet. No new complaints  Labs Reviewed and Medications Reviewed.    Review of Systems:  Review of Systems   Constitutional: Negative for fever.   Gastrointestinal: Negative for vomiting.   Skin: Negative for rash.   All other systems reviewed and are negative.      Hemodynamics:  Temp (24hrs), Av.9 °C (98.5 °F), Min:36.7 °C (98.1 °F), Max:37.1 °C (98.8 °F)  Temperature: 36.9 °C (98.5 °F)  Pulse  Av.2  Min: 54  Max: 127   Blood Pressure: 155/88       Physical Exam:  Physical Exam  Vitals signs and nursing note reviewed.   Constitutional:       General: He is not in acute distress.     Appearance: He is not ill-appearing, toxic-appearing or diaphoretic.      Comments: disheveled   HENT:      Nose: No congestion or rhinorrhea.       Mouth/Throat:      Comments: Poor dentition  Eyes:      General:         Right eye: No discharge.         Left eye: No discharge.      Extraocular Movements: Extraocular movements intact.      Pupils: Pupils are equal, round, and reactive to light.   Neck:      Musculoskeletal: Neck supple. No muscular tenderness.   Cardiovascular:      Rate and Rhythm: Normal rate and regular rhythm.      Heart sounds: No murmur.   Pulmonary:      Effort: Pulmonary effort is normal. No respiratory distress.      Breath sounds: No stridor.   Abdominal:      General: Abdomen is flat. There is no distension.      Palpations: Abdomen is soft.      Tenderness: There is no abdominal tenderness.   Musculoskeletal:         General: Swelling present.      Comments: Left lower extremity dressed  Wound pictures reviewed  Wound 23 cm X 7 cm, no slough, dark blood   Skin:     General: Skin is warm.      Coloration: Skin is not jaundiced.      Findings: No rash.   Neurological:      General: No focal deficit present.      Mental Status: He is alert.   Psychiatric:         Mood and Affect: Mood normal.         Meds:    Current Facility-Administered Medications:   •  amLODIPine  •  diphenhydrAMINE  •  linezolid  •  senna-docusate  •  sertraline  •  polyethylene glycol/lytes  •  magnesium hydroxide  •  [COMPLETED] piperacillin-tazobactam **AND** piperacillin-tazobactam  •  atorvastatin  •  ferrous gluconate  •  acetaminophen  •  ondansetron  •  ondansetron  •  promethazine  •  promethazine  •  prochlorperazine  •  hydrALAZINE  •  oxyCODONE immediate-release  •  morphine injection    Labs:  Recent Labs     05/02/20  0049 05/03/20  0045 05/04/20  0331   WBC 8.0 8.0 8.2   RBC 2.50* 2.50* 2.57*   HEMOGLOBIN 7.7* 8.0* 8.0*   HEMATOCRIT 24.9* 24.4* 24.4*   MCV 99.6* 97.6 94.9   MCH 30.8 32.0 31.1   RDW 55.7* 53.8* 51.8*   PLATELETCT 228 220 213   MPV 8.4* 8.2* 8.2*   NEUTSPOLYS 72.70* 72.40* 77.60*   LYMPHOCYTES 15.70* 16.10* 11.50*   MONOCYTES 8.50 8.40  8.30   EOSINOPHILS 1.90 1.80 1.50   BASOPHILS 0.80 0.90 0.60     Recent Labs     05/03/20  0045   SODIUM 138   POTASSIUM 5.1   CHLORIDE 103   CO2 23   GLUCOSE 97   BUN 17     Recent Labs     05/03/20  0045   CREATININE 1.27       Imaging:  Ct-extremity, Lower With Left    Result Date: 4/21/2020 4/21/2020 5:16 PM HISTORY/REASON FOR EXAM:  Lower leg pain, suspected osteomyelitis. TECHNIQUE/EXAM DESCRIPTION AND NUMBER OF VIEWS:  CT scan of the LEFT lower extremity with contrast, with reconstructions. Thin helical 3 mm sections were obtained from the distal femur through the proximal tibia/fibula. Sagittal and coronal multiplanar reconstructions were generated from the axial images. A total of 100 mL of Omnipaque 350 nonionic contrast was administered  IV without complication. Up to date radiation dose reduction adjustments have been utilized to meet ALARA standards for radiation dose reduction. COMPARISON: 4/16/2020. FINDINGS: No acute fracture or dislocation is identified. Cellulitis of the lower leg, with soft tissue prominence and subcutaneous air. No subjacent cortical bone destruction to suggest osteomyelitis. No drainable abscess. There is chronic appearing valgus deviation of the foot at the subtalar joint, and there is suspected pes planovalgus. Moderate subtalar osteoarthrosis. Multifocal mild to moderate osteoarthrosis in the midfoot. Mild multifocal interphalangeal osteoarthrosis. Reactive popliteal lymphadenopathy.     1. No evidence of osteomyelitis. 2. Cellulitis and soft tissue organs of the left lower leg. Subcutaneous air without drainable abscess. 3. Suspected pes planovalgus deformity. Multifocal osteoarthrosis of the midfoot.    Ct-extremity, Lower With Left    Result Date: 4/16/2020 4/16/2020 1:45 PM HISTORY/REASON FOR EXAM:  Lower leg swelling/redness, cellulitis suspected. Left lower extremity edema and redness TECHNIQUE/EXAM DESCRIPTION AND NUMBER OF VIEWS:  CT scan of the LEFT lower extremity  with contrast, with reconstructions. Thin helical 3 mm sections were obtained from the distal femur through the proximal tibia/fibula. Sagittal and coronal multiplanar reconstructions were generated from the axial images. A total of 100 mL of Omnipaque 350 nonionic contrast was administered  IV without complication. Up to date radiation dose reduction adjustments have been utilized to meet ALARA standards for radiation dose reduction. COMPARISON: 12/26/2019 FINDINGS: There is diffuse subcutaneous edema in the lower leg. The overlying skin is markedly thickened. There is curvilinear fluid deep to the fascia, but superficial to the gastrocnemius muscle extending from the knee joint to the ankle. Edema extends into the deeper fascial planes. No soft tissue gas is identified. No definite muscle necrosis is identified. No thrombus is identified. The Achilles tendon is intact. The bones are osteopenic. No cortical destruction is identified.     1.  Extensive subcutaneous edema with overlying skin thickening. Inflammation extends along the fascial planes with curvilinear fluid superficial to the gastrocnemius muscle. No soft tissue gas to suggest necrotizing fasciitis. 2.  Osteopenia    Us-renal    Result Date: 4/19/2020 4/19/2020 10:53 AM HISTORY/REASON FOR EXAM:  Acute renal failure TECHNIQUE/EXAM DESCRIPTION: Renal ultrasound. COMPARISON:  08/15/2018 FINDINGS: The right kidney measures 8.11 cm. The left kidney measures 9.17 cm. There is no hydronephrosis. There are no abnormal calcifications. Small right pleural effusion is identified. The bladder demonstrates no focal wall abnormality.     1.  Right kidney appears smaller than left kidney which could be due to atrophy. 2.  No hydronephrosis. 3.  Incidentally noted small right pleural effusion.      Micro:  Results     Procedure Component Value Units Date/Time    SARS-CoV-2, PCR (In-House) [[id number]] Collected:  05/04/20 0948    Order Status:  Completed Updated:   05/04/20 1139     SARS-CoV-2 Source NP Swab     SARS-CoV-2 by PCR NotDetected     Comment: [facility] providers: PLEASE REFER TO DE-ESCALATION AND RETESTING PROTOCOL  on [email or web address]  **The [employer] GeneXpert Xpress SARS-CoV-2 Test has been made available for  use under the Emergency Use Authorization (EUA) only.         Narrative:       Contact  Rule-out COVID-19 panel, includes droplet/contact/eye  isolation order.  Check influenza to order this test only if  specifically indicated.  Please run rapid test, pt is pre op for today.    COVID/SARS CoV-2 [[id number]] Collected:  05/04/20 0948    Order Status:  Completed Specimen:  Respirate from Nasopharyngeal Updated:  05/04/20 1001     COVID Order Status Received    Narrative:       Contact  Rule-out COVID-19 panel, includes droplet/contact/eye  isolation order.  Check influenza to order this test only if  specifically indicated.  Please run rapid test, pt is pre op for today.          Assessment/Plan:  Left lower extremity cellulitis, complicated  Chronic venous stasis   Afebrile  Resolved leukocytosis  CT of the left lower extremity on 4/21 with no osteomyelitis or drainable abscess  s/p I&D on 4/22 down to muscle  S/p repeat I and D on 4/24 for repeat I&D of mult compartments and placement of wound VAC: skin at the margins necrotic  S/p repeat I&D and [place] change on 4/27 by Dr. John  Blood culture negative  Wound cultures on 4/22 +GAS and MRSA  Intraoperative wound cx + pseudomonas aeruginosa and GAS  Plan for repeat I&D with skin graft on 5/4/2020  Continue linezolid and IV zosyn for now  Monitor CBC while on linezolid  Anticipated stop date abx 5/10/2020 if no further complications-    Methamphetamine abuse  Not a candidate for [facility]IC    Hepatitis C   status post treatment  No additional treatment at this time    LUPE, improved  Avoiding nephrotoxic agents      Prognosis for limb salvage guarded

## 2020-05-04 NOTE — CARE PLAN
Problem: Pain Management  Goal: Pain level will decrease to patient's comfort goal  Outcome: PROGRESSING AS EXPECTED  Note: Patient pain level assessed using the 1-10 scale. Patient given medication as per MAR, encouraged position changes and rest.      Problem: Skin Integrity  Goal: Risk for impaired skin integrity will decrease  Outcome: PROGRESSING SLOWER THAN EXPECTED  Note: Patient on pressure relieving mattress. Moisturizer in use. Heels floated with pillows. Barrier paste used if indicated.  Pt is seen by wound care. Dressings are clean, dry and intact. Pt is able to turn self in bed.

## 2020-05-04 NOTE — ANESTHESIA PREPROCEDURE EVALUATION
Relevant Problems   NEURO   (+) History of prediabetes      CARDIAC   (+) Chronic migraine without aura without status migrainosus, not intractable   (+) Essential hypertension   (+) Pulmonary hypertension (HCC)      GI   (+) Erosive gastritis   (+) Gastroesophageal reflux disease with esophagitis         (+) CKD (chronic kidney disease) stage 3, GFR 30-59 ml/min (HCC)   (+) Chronic hepatitis C without hepatic coma (HCC)      Other   (+) Charcot's joint of left foot, non-diabetic       Physical Exam    Airway   Mallampati: II  TM distance: >3 FB  Neck ROM: full       Cardiovascular - normal exam  Rhythm: regular  Rate: normal  (-) murmur     Dental       Very poor dentition   Pulmonary - normal exam  Breath sounds clear to auscultation     Abdominal    Neurological - normal exam                 Anesthesia Plan    ASA 3 (chronic renal insufficiency, hep C)   ASA physical status 3 criteria: alcohol and/or substance dependence or abuse and other (comment)    Plan - general       Airway plan will be LMA      Plan Factors:   Patient was not previously instructed to abstain from smoking on day of procedure.  Patient did not smoke on day of procedure.      Induction: intravenous    Postoperative Plan: Postoperative administration of opioids is intended.    Pertinent diagnostic labs and testing reviewed    Informed Consent:    Anesthetic plan and risks discussed with patient.    Use of blood products discussed with: patient whom consented to blood products.

## 2020-05-04 NOTE — PROGRESS NOTES
Ortho H&P update for surgery    No change in status or plans, will proceed with procedure as planned and consent signed

## 2020-05-04 NOTE — PROGRESS NOTES
Report called to Mihaela in pre-op. Mihaela stated carlito she did not know what time patient would be brought down to surgery but that he should be earlier than preciously scheduled.

## 2020-05-04 NOTE — PROGRESS NOTES
Received bedside report from Marko pollock RN. Assumed care at 1900. Patient is awake/alert. Assessment completed. Pt on room air. PIV in the right forearm infusing antibiotic. Wound vac to the left leg running at 125. Bed locked in lowest position. Call light within reach. Whiteboard updates with nurse's and CNA's numbers. Hourly rounding in place.

## 2020-05-05 ENCOUNTER — PATIENT OUTREACH (OUTPATIENT)
Dept: HEALTH INFORMATION MANAGEMENT | Facility: OTHER | Age: 65
End: 2020-05-05

## 2020-05-05 LAB
ABO GROUP BLD: NORMAL
ANION GAP SERPL CALC-SCNC: 12 MMOL/L (ref 7–16)
BARCODED ABORH UBTYP: 6200
BARCODED PRD CODE UBPRD: NORMAL
BARCODED UNIT NUM UBUNT: NORMAL
BASOPHILS # BLD AUTO: 0.5 % (ref 0–1.8)
BASOPHILS # BLD: 0.04 K/UL (ref 0–0.12)
BLD GP AB SCN SERPL QL: NORMAL
BUN SERPL-MCNC: 20 MG/DL (ref 8–22)
CALCIUM SERPL-MCNC: 7.7 MG/DL (ref 8.5–10.5)
CHLORIDE SERPL-SCNC: 100 MMOL/L (ref 96–112)
CO2 SERPL-SCNC: 20 MMOL/L (ref 20–33)
COMPONENT R 8504R: NORMAL
CREAT SERPL-MCNC: 1.33 MG/DL (ref 0.5–1.4)
EOSINOPHIL # BLD AUTO: 0.17 K/UL (ref 0–0.51)
EOSINOPHIL NFR BLD: 2 % (ref 0–6.9)
ERYTHROCYTE [DISTWIDTH] IN BLOOD BY AUTOMATED COUNT: 52.6 FL (ref 35.9–50)
GLUCOSE SERPL-MCNC: 101 MG/DL (ref 65–99)
HCT VFR BLD AUTO: 20.8 % (ref 42–52)
HGB BLD-MCNC: 6.9 G/DL (ref 14–18)
IMM GRANULOCYTES # BLD AUTO: 0.07 K/UL (ref 0–0.11)
IMM GRANULOCYTES NFR BLD AUTO: 0.8 % (ref 0–0.9)
LYMPHOCYTES # BLD AUTO: 0.98 K/UL (ref 1–4.8)
LYMPHOCYTES NFR BLD: 11.8 % (ref 22–41)
MCH RBC QN AUTO: 31.9 PG (ref 27–33)
MCHC RBC AUTO-ENTMCNC: 33.2 G/DL (ref 33.7–35.3)
MCV RBC AUTO: 96.3 FL (ref 81.4–97.8)
MONOCYTES # BLD AUTO: 0.78 K/UL (ref 0–0.85)
MONOCYTES NFR BLD AUTO: 9.4 % (ref 0–13.4)
NEUTROPHILS # BLD AUTO: 6.29 K/UL (ref 1.82–7.42)
NEUTROPHILS NFR BLD: 75.5 % (ref 44–72)
NRBC # BLD AUTO: 0.02 K/UL
NRBC BLD-RTO: 0.2 /100 WBC
PLATELET # BLD AUTO: 177 K/UL (ref 164–446)
PMV BLD AUTO: 8.4 FL (ref 9–12.9)
POTASSIUM SERPL-SCNC: 4.4 MMOL/L (ref 3.6–5.5)
PRODUCT TYPE UPROD: NORMAL
RBC # BLD AUTO: 2.16 M/UL (ref 4.7–6.1)
RH BLD: NORMAL
SODIUM SERPL-SCNC: 132 MMOL/L (ref 135–145)
UNIT STATUS USTAT: NORMAL
WBC # BLD AUTO: 8.3 K/UL (ref 4.8–10.8)

## 2020-05-05 PROCEDURE — 80048 BASIC METABOLIC PNL TOTAL CA: CPT

## 2020-05-05 PROCEDURE — 36415 COLL VENOUS BLD VENIPUNCTURE: CPT

## 2020-05-05 PROCEDURE — 302098 PASTE RING (FLAT): Performed by: HOSPITALIST

## 2020-05-05 PROCEDURE — 86900 BLOOD TYPING SEROLOGIC ABO: CPT

## 2020-05-05 PROCEDURE — 700111 HCHG RX REV CODE 636 W/ 250 OVERRIDE (IP): Performed by: HOSPITALIST

## 2020-05-05 PROCEDURE — 36430 TRANSFUSION BLD/BLD COMPNT: CPT

## 2020-05-05 PROCEDURE — 700102 HCHG RX REV CODE 250 W/ 637 OVERRIDE(OP): Performed by: HOSPITALIST

## 2020-05-05 PROCEDURE — 86850 RBC ANTIBODY SCREEN: CPT

## 2020-05-05 PROCEDURE — 85025 COMPLETE CBC W/AUTO DIFF WBC: CPT

## 2020-05-05 PROCEDURE — 700105 HCHG RX REV CODE 258: Performed by: HOSPITALIST

## 2020-05-05 PROCEDURE — 99232 SBSQ HOSP IP/OBS MODERATE 35: CPT | Performed by: NURSE PRACTITIONER

## 2020-05-05 PROCEDURE — 97606 NEG PRS WND THER DME>50 SQCM: CPT

## 2020-05-05 PROCEDURE — 700102 HCHG RX REV CODE 250 W/ 637 OVERRIDE(OP): Performed by: FAMILY MEDICINE

## 2020-05-05 PROCEDURE — A9270 NON-COVERED ITEM OR SERVICE: HCPCS | Performed by: NURSE PRACTITIONER

## 2020-05-05 PROCEDURE — 86901 BLOOD TYPING SEROLOGIC RH(D): CPT

## 2020-05-05 PROCEDURE — 700102 HCHG RX REV CODE 250 W/ 637 OVERRIDE(OP): Performed by: NURSE PRACTITIONER

## 2020-05-05 PROCEDURE — 99233 SBSQ HOSP IP/OBS HIGH 50: CPT | Performed by: INTERNAL MEDICINE

## 2020-05-05 PROCEDURE — 770021 HCHG ROOM/CARE - ISO PRIVATE

## 2020-05-05 PROCEDURE — A9270 NON-COVERED ITEM OR SERVICE: HCPCS | Performed by: HOSPITALIST

## 2020-05-05 PROCEDURE — P9016 RBC LEUKOCYTES REDUCED: HCPCS

## 2020-05-05 PROCEDURE — 86923 COMPATIBILITY TEST ELECTRIC: CPT

## 2020-05-05 PROCEDURE — 99232 SBSQ HOSP IP/OBS MODERATE 35: CPT | Performed by: HOSPITALIST

## 2020-05-05 PROCEDURE — 700111 HCHG RX REV CODE 636 W/ 250 OVERRIDE (IP): Performed by: FAMILY MEDICINE

## 2020-05-05 PROCEDURE — A9270 NON-COVERED ITEM OR SERVICE: HCPCS | Performed by: FAMILY MEDICINE

## 2020-05-05 RX ORDER — SODIUM CHLORIDE 9 MG/ML
INJECTION, SOLUTION INTRAVENOUS CONTINUOUS
Status: ACTIVE | OUTPATIENT
Start: 2020-05-05 | End: 2020-05-05

## 2020-05-05 RX ADMIN — DIPHENHYDRAMINE HYDROCHLORIDE 25 MG: 25 TABLET ORAL at 22:15

## 2020-05-05 RX ADMIN — OXYCODONE 10 MG: 5 TABLET ORAL at 00:15

## 2020-05-05 RX ADMIN — FERROUS GLUCONATE 324 MG: 324 TABLET ORAL at 07:42

## 2020-05-05 RX ADMIN — AMLODIPINE BESYLATE 10 MG: 10 TABLET ORAL at 06:08

## 2020-05-05 RX ADMIN — DIPHENHYDRAMINE HYDROCHLORIDE 25 MG: 25 TABLET ORAL at 06:06

## 2020-05-05 RX ADMIN — LINEZOLID 600 MG: 600 TABLET, FILM COATED ORAL at 17:10

## 2020-05-05 RX ADMIN — OXYCODONE 10 MG: 5 TABLET ORAL at 04:25

## 2020-05-05 RX ADMIN — OXYCODONE 10 MG: 5 TABLET ORAL at 08:34

## 2020-05-05 RX ADMIN — OXYCODONE 10 MG: 5 TABLET ORAL at 12:46

## 2020-05-05 RX ADMIN — OXYCODONE 10 MG: 5 TABLET ORAL at 21:15

## 2020-05-05 RX ADMIN — LINEZOLID 600 MG: 600 TABLET, FILM COATED ORAL at 06:07

## 2020-05-05 RX ADMIN — SODIUM CHLORIDE: 9 INJECTION, SOLUTION INTRAVENOUS at 12:47

## 2020-05-05 RX ADMIN — SERTRALINE HYDROCHLORIDE 50 MG: 50 TABLET ORAL at 06:09

## 2020-05-05 RX ADMIN — MORPHINE SULFATE 2 MG: 4 INJECTION INTRAVENOUS at 15:42

## 2020-05-05 RX ADMIN — ATORVASTATIN CALCIUM 40 MG: 40 TABLET, FILM COATED ORAL at 06:09

## 2020-05-05 RX ADMIN — MORPHINE SULFATE 2 MG: 4 INJECTION INTRAVENOUS at 09:18

## 2020-05-05 RX ADMIN — PIPERACILLIN AND TAZOBACTAM 4.5 G: 4; .5 INJECTION, POWDER, LYOPHILIZED, FOR SOLUTION INTRAVENOUS; PARENTERAL at 21:15

## 2020-05-05 RX ADMIN — DOCUSATE SODIUM 50 MG AND SENNOSIDES 8.6 MG 2 TABLET: 8.6; 5 TABLET, FILM COATED ORAL at 06:09

## 2020-05-05 RX ADMIN — PIPERACILLIN AND TAZOBACTAM 4.5 G: 4; .5 INJECTION, POWDER, LYOPHILIZED, FOR SOLUTION INTRAVENOUS; PARENTERAL at 14:23

## 2020-05-05 RX ADMIN — DOCUSATE SODIUM 50 MG AND SENNOSIDES 8.6 MG 2 TABLET: 8.6; 5 TABLET, FILM COATED ORAL at 17:10

## 2020-05-05 RX ADMIN — PIPERACILLIN AND TAZOBACTAM 4.5 G: 4; .5 INJECTION, POWDER, LYOPHILIZED, FOR SOLUTION INTRAVENOUS; PARENTERAL at 04:39

## 2020-05-05 ASSESSMENT — ENCOUNTER SYMPTOMS
SENSORY CHANGE: 1
VOMITING: 0
NERVOUS/ANXIOUS: 0
INSOMNIA: 0
NECK PAIN: 0
CHILLS: 0
FEVER: 0
COUGH: 0
DIZZINESS: 0
BLURRED VISION: 0
NAUSEA: 0
TREMORS: 0
MYALGIAS: 1
PALPITATIONS: 0
SHORTNESS OF BREATH: 0
ABDOMINAL PAIN: 0
HEADACHES: 0
PHOTOPHOBIA: 0

## 2020-05-05 ASSESSMENT — PAIN SCALES - WONG BAKER: WONGBAKER_NUMERICALRESPONSE: HURTS A WHOLE LOT

## 2020-05-05 NOTE — PROGRESS NOTES
Assumed care of pt at 0700. Pt AAOx4, VSS. Wound care dressing wound. Plan to perform skin graft tomorrow and blood transfusion today. Plan of care reviewed w/pt. Hourly rounding in place. Will continue to monitor.

## 2020-05-05 NOTE — PROGRESS NOTES
LIMB PRESERVATION SERVICE   POST SURGICAL PROGRESS NOTE      HPI:  64 y.o.  with a past medical history that includes hypertension, hepatitis C, kidney disease, polysubstance abuse, nondiabetic Charcot's deformity left foot, and chronic venous stasis ulcers bilateral lower extremities admitted 4/16/2020 for Cellulitis of left lower extremity.        LPS has been consulted for evaluation of left lower leg venous stasis ulcers.  The patient has a history of chronic venous stasis ulcers and has been seen in outpatient wound clinic for treatment as well as infectious disease in October 2019 for an infected skin ulcer.  He reports that his previous venostasis ulcers had resolved approximately 1 month ago and he was discharged from the outpatient wound clinic.  He states that 1 week ago (4/12/2020) he was in the shower and noticed that he developed an ulcer on the anterior aspect of his lower leg distal to his knee and then the ulcer had continued to worsen from there radiating to the back of his left lower leg and development of new ulcers down his left lateral lower leg to his ankle.  He reports having fever, chills, and a large amount of clear drainage from his ulcers.  He states that 4/15/2020 his left lower leg began to swell and developed redness.  He was seen in the outpatient wound center where he was found to have significant swelling and erythema to his left lower extremity, weeping from superficial tissue, and foul-smelling odor.    Patient proceeded to ED.  ESR 97, CRP 31.7.  He was started on vancomycin and Zosyn IV and admitted under hospital services.     SURGERY DATE: 04/22/20  PROCEDURE: with Dr. Israel  1.  Left leg irrigation and debridement, multiple compartments, deep down to   the level of muscle.  2.  Left fasciotomy anterior and posterior compartments.  3.  Left placement of wound VAC.    SURGERY DATE: 04/24/20  PROCEDURE: with Dr. Israel  1.  Left leg irrigation and debridement, multiple  compartments.  2.  Placement of wound VAC.    SURGERY DATE: 04/27/20  PROCEDURE: with Dr. John  1.  Left leg irrigation and debridement, multiple compartments.  2.  Placement of wound VAC.    SURGERY DATE: 05/4/2020 with Dr. Israel and Dr. John  PROCEDURE: Left leg irrigation and debridement.         4/22/2020: Patient denies fevers, chills, nausea, vomiting.  Complains of pain with inspection of lower leg ulcers.  Tract with purulent drainage noted yesterday by wound team.  Repeat CT ordered and was negative for abscess or osteomyelitis.  Patient has been n.p.o. since last night.  Dressings that were changed yesterday are completely saturated today.   Patient also has Charcot foot to left foot and complains of severe pain to foot when walking.  4/28/2020: Patient denies fevers, chills, nausea, vomiting.  Pain well controlled.   LPS was called by bedside RN to assess left LE.  Wound VAC placed last night, clotted off when patient arrived to floor, troubleshoot by night RN, unable to resolve.  NOC RN removed drape (not the foam) and the wound started bleeding.  NOC held pressure to wound bed, and applied pressure dressing.    4/29/2020: Patient's Left LE dressing intact, removed pressure dressing with wound care RNOriana.  Still open bleeding with light trickle of, used AgNO3 to help stop small bleeds, larger bleeds pressure dressing replaced by wound RNOriana.   5/01/2020: Patient denies fever, chills, n/v.  No complaints of pain.  Dressing removed and wounds assessed.  Wound VAC was placed on Left LE Anterior/Lateral wound.  2 layer compression wrap placed to left LE.  Plan for patient to go to surgery on Monday.   5/5/2020: POD #1 S/P left leg I&D.  Denies any fevers, chills, nausea, vomiting.  Seen with wound team during VAC placement to left lower leg.                  PERTINENT LPS RESULTS:   No new pertinent results    SURGICAL SITE EXAM:      /88   Pulse 70   Temp 36.7 °C (98.1 °F)  (Temporal)   Resp 18   Ht 1.829 m (6')   Wt 81.3 kg (179 lb 3.7 oz)   SpO2 99%   BMI 24.31 kg/m²     Pedal Pulses: palpable pulses  Sensation: LOPS  Foot warm to touch    Left lateral calf:  Red tissue, bleeding significantly from proximal aspect where there is a tract at 11:00 of 6 cm extending proximally.  No odor  No erythema  Moderate edema      Left posterior medial ankle:  Small incision with sutures well approximated.  Incision surrounded by full -thickness wound with pale yellow tissue.  No odor    Left anterior lower leg ulcer:  Full-thickness pale yellow tissue  No odor      Procedure: Copious amounts of bleeding noted from left lateral calf ulcer: Surgifoam applied followed by pressure dressing.  Patient placed into reverse Trendelenburg.  Pressure dressing removed 15 minutes later and bleeding had subsided.  Wound care completed by Fozia Bear RN with wound team.  See note and flowsheet for further detail.      DIABETES MANAGEMENT:    Blood glucose:   Results from last 7 days   Lab Units 05/04/20  2039   ACCU CHECK GLUCOSE 788 mg/dL 90     A1c:   Lab Results   Component Value Date/Time    HBA1C 5.7 (H) 01/23/2020 11:22 AM            INFECTION MANAGEMENT:    Results from last 7 days   Lab Units 05/05/20  0405 05/04/20  0331 05/03/20  0045 05/02/20  0049 05/01/20  0025 04/30/20  0238   WBC 1501 K/uL 8.3 8.2 8.0 8.0 7.3 7.5   PLATELET COUNT 1518 K/uL 177 213 220 228 246 242     Wound culture results:   Results     Procedure Component Value Units Date/Time    SARS-CoV-2, PCR (In-House) [[id number]] Collected:  05/04/20 0948    Order Status:  Completed Updated:  05/04/20 1139     SARS-CoV-2 Source NP Swab     SARS-CoV-2 by PCR NotDetected     Comment: [facility] providers: PLEASE REFER TO DE-ESCALATION AND RETESTING PROTOCOL  on inside[email or web address]  **The [place] GeneXpert Xpress SARS-CoV-2 Test has been made available for  use under the Emergency Use Authorization (EUA) only.         Narrative:        Contact  Rule-out COVID-19 panel, includes droplet/contact/eye  isolation order.  Check influenza to order this test only if  specifically indicated.  Please run rapid test, pt is pre op for today.    COVID/SARS CoV-2 [[id number]] Collected:  05/04/20 0948    Order Status:  Completed Specimen:  Respirate from Nasopharyngeal Updated:  05/04/20 1001     COVID Order Status Received    Narrative:       Contact  Rule-out COVID-19 panel, includes droplet/contact/eye  isolation order.  Check influenza to order this test only if  specifically indicated.  Please run rapid test, pt is pre op for today.               ASSESSMENT/PLAN:   Patient POD #1 S/P left lower leg I&D.  Patient has had 4 I&D's to this area thus far.  At this time there is a significant tract of 6 cm to the proximal lateral calf.  Wound bed remains bloody and deep.  Recommend continued VERAFLO wound VAC for the next 1-2 weeks and then reevaluate for possible split-thickness skin graft versus skin substitute placement.          Wound care:   - wound team to manage wound care.   -regular VAC to all LLE wounds d/t bleeding today and surgifoam placement  -tomorrow transition to VERAFLO to lateral calf, leave regular VAC to posteriomedial and anterior leg ulcers  -2 layer compression wrap to LLE  -VAC change and 2 layer compression wrap 3x/wk     Vascular status:   - Palpable pulses    Antibiotics:   - ID following  -continue on zosyn and linezolid    Weight Bearing Status:   -Weight bearing as tolerated    Offloading:   -offload with pillows    PT/OT :   -involved,       Plan to return to O.R.:   Patient n.p.o. at midnight.  Possible plan to return to surgery for possible STSG on Wed 5/6/2020    DISCHARGE PLAN:    Disposition: recommendation to SNF or Rehab     Follow-up: pending      Discussed with: pt, RN, Dr. John, wound team,         [email or web address]    If any questions or concerns, please call [phone]

## 2020-05-05 NOTE — PROGRESS NOTES
Ortho Post OP    Patient had some additional tissue that appear unhealthy as well as some purulence noted around the ankle.  Repeat I&D performed but not skin grafts.  Tourniquet was not used during the irrigation portion and bleeding was minimal.  Dressings placed over wounds.    Plan:    Wound team to place wound vac tomorrow  Possible return to OR on Wednesday for skin grafts if wound appears clean, if not then we will repeat the I&D.

## 2020-05-05 NOTE — CARE PLAN
Problem: Safety  Goal: Will remain free from injury  Outcome: PROGRESSING AS EXPECTED  Note: Bed in locked and lowest position. Three side rails up. Call light within reach. Patient instructed on use of call bell and how to call for assistance. Threaded socks in use. Objects in room cleared for ambulation. Pt knows how to call appropriately.      Problem: Respiratory:  Goal: Respiratory status will improve  Outcome: PROGRESSING AS EXPECTED  Note: Patient currently on 2 L of O2. Patient encouraged to turn, deep breathe and cough. Patient also encouraged to sit up greater than 30 degrees and to use the incentive spirometer.  Pt has been saturating in the high 90s.

## 2020-05-05 NOTE — OR NURSING
Pt on 1 L NC at this time.  Transported on simple mask for transport only.  No c/o nausea, tolerating PO fluids/juice and medication.  LLE dressing CDI, elevated. Left toes dusky - baseline.   Pt able to move left leg.  LLE pain now tolerable per pt, 4/10.  VSS, afebrile, MADRIGAL, A/O x4.    No belongings in PACU.  Transported on oxygen, tank 100% full.     Wound vac on end of bed.

## 2020-05-05 NOTE — OP REPORT
DATE OF SERVICE:  05/04/2020    PREOPERATIVE DIAGNOSIS:  Left leg abscess.    POSTOPERATIVE DIAGNOSIS:  Left leg abscess.    PROCEDURE PERFORMED:  Left leg irrigation and debridement.    SURGEON:  Barrie Israel MD    FIRST ASSISTANT:  Pasquale John MD    ANESTHESIA:  General endotracheal.    ESTIMATED BLOOD LOSS:  None.    COMPLICATIONS:  None.    POSTOPERATIVE PLAN:  Return to the operating room for hopeful skin grafting.    INDICATIONS:  The patient is a repeat patient of ours.  He was brought back   for staged irrigation and debridement.  Plan was skin graft today if his wound   looked good; however, when his wound was taken down, he had purulent drainage   coming out of the most distal medial aspect.  Above procedure was discussed   and all questions were answered.  Risks of surgery were explained, which   include, but not limited to wound problems, infection, nerve injury, vascular   injury, need for further surgery.  He understands he could have persistent   risk of his infection and ultimately amputation.  He understands and accepts   these risks.  Site was marked by myself prior to receiving psychotropic   medicines.    PROCEDURE IN DETAIL:  The patient was brought to the operating room.  He   underwent general endotracheal anesthesia without complications.  His left   lower extremity was prepped and draped in standard fashion in supine position   with all appropriate padding.  Positive site verification confirmed his left   lower extremity as well as above procedure and confirmation that he received   preoperative antibiotics.  No Esmarch was used.  Tourniquet was inflated.  He   had some necrotic tissue underneath the wound VAC.  This was sharply debrided   with a curette and Knutson.  This was down to good bloody tissue when debridement   ended.  There were no obvious abscesses or collections there.  In the   posteromedial aspect, an incision was made.  Blunt dissection was made down to   the  posteromedial aspect of his ankle where he had some purulent drainage.    No actual abscess was identified.  Wound was irrigated with copious   irrigation.  Sterile dressings were applied.  He was transferred to the   recovery room in good condition.    Utilization of Dr. John was necessary for patient positioning, holding,   retracting, wound closure and dressing placement.  He was present throughout   the entire procedure.       ____________________________________     MD SARAH ROSAS / DERRICK    DD:  05/04/2020 18:17:58  DT:  05/04/2020 21:10:53    D#:  [id number]  Job#:  [id number]

## 2020-05-05 NOTE — PROGRESS NOTES
Received report from outgoing nurse. Patient a+ox4, expressed having some pain in lower extremity. Patient requested pain meds around the clock. Patient picked up from surgery around 4pm. Patient still not back from surgery at end of shift.

## 2020-05-05 NOTE — PROGRESS NOTES
2 RN Skin Check    2 RN skin check complete with KEN Carmona    Devices in place: oxygen tubing   Skin assessed under devices: yes  Confirmed pressure ulcers found on: NA  New potential pressure ulcers noted on NA  Wound consult placed: no   The following interventions in place: encouraging mobility and turns, oxygen silicone tubing with foam in place, pillows for support and repositioning, barrier cream

## 2020-05-05 NOTE — ANESTHESIA TIME REPORT
Anesthesia Start and Stop Event Times     Date Time Event    5/4/2020 1700 Ready for Procedure     1708 Anesthesia Start     1807 Anesthesia Stop        Responsible Staff  05/04/20    Name Role Begin End    Juaquin Brownlee M.D. Anesth 1708 1807        Preop Diagnosis (Free Text):  Pre-op Diagnosis     Left leg abscess, soft tissue defect        Preop Diagnosis (Codes):    Post op Diagnosis  Venous stasis ulcer (HCC)      Premium Reason  A. 3PM - 7AM    Comments: procedure: I&D left lower extremity wound with wound vac placement;  ASA 3 (peripheral vascular disease, Hep C, substance abuse history)

## 2020-05-05 NOTE — ANESTHESIA POSTPROCEDURE EVALUATION
Patient: Goran Chavez    Procedure Summary     Date:  05/04/20 Room / Location:  [facility] 12 / SURGERY [facility]    Anesthesia Start:  1708 Anesthesia Stop:  1807    Procedure:  IRRIGATION AND DEBRIDEMENT, WOUND - LEG (Left Leg Lower) Diagnosis:  (Left leg abscess, soft tissue defect)    Surgeon:  Barrie Israel M.D. Responsible Provider:  Juaquin Brownlee M.D.    Anesthesia Type:  general ASA Status:  3          Final Anesthesia Type: general  Last vitals  BP   Blood Pressure: 124/85    Temp   36.7 °C (98.1 °F)    Pulse   Pulse: (!) 108   Resp   14    SpO2   92 %      Anesthesia Post Evaluation    Patient location during evaluation: PACU  Patient participation: complete - patient participated  Level of consciousness: awake and alert  Pain score: 5    Airway patency: patent  Anesthetic complications: no  Cardiovascular status: adequate  Respiratory status: acceptable  Hydration status: acceptable    PONV: none           Nurse Pain Score: 7 (NPRS)

## 2020-05-05 NOTE — PROGRESS NOTES
Infectious Disease Progress Note    Author: Mayte Fuller M.D. Date & Time of service: 2020  12:10 PM    Chief Complaint:  Follow-up for necrotizing LLE infection/cellulitis    Interval History:  64-year-old male with methamphetamine abuse, hepatitis C status post treatment, bilateral lower extremity venous stasis dermatitis with ulcerations, admitted 2020 with left leg cellulitis and fever   afebrile, white count 10.2, tolerating Zyvox and Zosyn.  Reports feeling better overall.   afebrile, white count 7.6, tolerating antibiotics with no new issues.   afebrile WBC 8.3 plan for repeat I&D today, denies pain, no new clinical issues   AF WBC 14.3 S/p repeat I&D yesterday. Wound vac removed given excessive bloody drainage. Rate pain 6-7/10 in severity.   AF WBC 7.5 had blood transfusion over night and feeling better. No new complaints. LLE dressed without any bloody drainage   AF WBC 7.3 LLE dressed-pain controlled States plan for skin graft possibly Mon. No new complaints   AF WBC 8 no new complaints-tolerated VAC change yesterday. Denies SE abx  5/3 AF no new complaints-plan for OR tomorrow   AF WBC 8.2-plan for OR still today-no time yet. No new complaints   AF WBC 8.3 seen with wound care-skin graft deferred due to continued necrosis and purulence ant tibial wound -possible placement tomorrow-denies SE abx-posterior calf wound healing well. Eating well  Labs Reviewed and Medications Reviewed.    Review of Systems:  Review of Systems   Constitutional: Negative for fever.   Gastrointestinal: Negative for vomiting.   Skin: Negative for itching and rash.   All other systems reviewed and are negative.      Hemodynamics:  Temp (24hrs), Av.6 °C (97.9 °F), Min:36 °C (96.8 °F), Max:37.2 °C (98.9 °F)  Temperature: 36.7 °C (98.1 °F)  Pulse  Av  Min: 54  Max: 127   Blood Pressure: 142/88       Physical Exam:  Physical Exam  Vitals signs and nursing note reviewed.    Constitutional:       General: He is not in acute distress.     Appearance: He is not ill-appearing, toxic-appearing or diaphoretic.      Comments: disheveled   HENT:      Nose: No congestion or rhinorrhea.      Mouth/Throat:      Comments: Poor dentition  Eyes:      General:         Right eye: No discharge.         Left eye: No discharge.      Extraocular Movements: Extraocular movements intact.      Pupils: Pupils are equal, round, and reactive to light.   Neck:      Musculoskeletal: Neck supple. No muscular tenderness.   Cardiovascular:      Rate and Rhythm: Normal rate and regular rhythm.      Heart sounds: No murmur.   Pulmonary:      Effort: Pulmonary effort is normal. No respiratory distress.      Breath sounds: No stridor.   Abdominal:      General: Abdomen is flat. There is no distension.      Palpations: Abdomen is soft.      Tenderness: There is no abdominal tenderness. There is no guarding.   Musculoskeletal:         General: Swelling present.      Comments: Left lower extremity dressed  Wound pictures reviewed  Wound 23 cm X 7 cm, no slough, dark blood   Skin:     General: Skin is warm.      Coloration: Skin is not jaundiced.      Findings: No rash.   Neurological:      General: No focal deficit present.      Mental Status: He is alert and oriented to person, place, and time.   Psychiatric:         Mood and Affect: Mood normal.         Behavior: Behavior normal.         Meds:    Current Facility-Administered Medications:   •  NS  •  silver nitrate  •  amLODIPine  •  diphenhydrAMINE  •  linezolid  •  senna-docusate  •  sertraline  •  polyethylene glycol/lytes  •  magnesium hydroxide  •  [COMPLETED] piperacillin-tazobactam **AND** piperacillin-tazobactam  •  atorvastatin  •  ferrous gluconate  •  acetaminophen  •  ondansetron  •  ondansetron  •  promethazine  •  promethazine  •  prochlorperazine  •  hydrALAZINE  •  oxyCODONE immediate-release  •  morphine injection    Labs:  Recent Labs      05/03/20  0045 05/04/20  0331 05/05/20  0405   WBC 8.0 8.2 8.3   RBC 2.50* 2.57* 2.16*   HEMOGLOBIN 8.0* 8.0* 6.9*   HEMATOCRIT 24.4* 24.4* 20.8*   MCV 97.6 94.9 96.3   MCH 32.0 31.1 31.9   RDW 53.8* 51.8* 52.6*   PLATELETCT 220 213 177   MPV 8.2* 8.2* 8.4*   NEUTSPOLYS 72.40* 77.60* 75.50*   LYMPHOCYTES 16.10* 11.50* 11.80*   MONOCYTES 8.40 8.30 9.40   EOSINOPHILS 1.80 1.50 2.00   BASOPHILS 0.90 0.60 0.50     Recent Labs     05/03/20  0045 05/05/20  0405   SODIUM 138 132*   POTASSIUM 5.1 4.4   CHLORIDE 103 100   CO2 23 20   GLUCOSE 97 101*   BUN 17 20     Recent Labs     05/03/20  0045 05/05/20  0405   CREATININE 1.27 1.33       Imaging:  Ct-extremity, Lower With Left    Result Date: 4/21/2020 4/21/2020 5:16 PM HISTORY/REASON FOR EXAM:  Lower leg pain, suspected osteomyelitis. TECHNIQUE/EXAM DESCRIPTION AND NUMBER OF VIEWS:  CT scan of the LEFT lower extremity with contrast, with reconstructions. Thin helical 3 mm sections were obtained from the distal femur through the proximal tibia/fibula. Sagittal and coronal multiplanar reconstructions were generated from the axial images. A total of 100 mL of Omnipaque 350 nonionic contrast was administered  IV without complication. Up to date radiation dose reduction adjustments have been utilized to meet ALARA standards for radiation dose reduction. COMPARISON: 4/16/2020. FINDINGS: No acute fracture or dislocation is identified. Cellulitis of the lower leg, with soft tissue prominence and subcutaneous air. No subjacent cortical bone destruction to suggest osteomyelitis. No drainable abscess. There is chronic appearing valgus deviation of the foot at the subtalar joint, and there is suspected pes planovalgus. Moderate subtalar osteoarthrosis. Multifocal mild to moderate osteoarthrosis in the midfoot. Mild multifocal interphalangeal osteoarthrosis. Reactive popliteal lymphadenopathy.     1. No evidence of osteomyelitis. 2. Cellulitis and soft tissue organs of the left lower  leg. Subcutaneous air without drainable abscess. 3. Suspected pes planovalgus deformity. Multifocal osteoarthrosis of the midfoot.    Ct-extremity, Lower With Left    Result Date: 4/16/2020 4/16/2020 1:45 PM HISTORY/REASON FOR EXAM:  Lower leg swelling/redness, cellulitis suspected. Left lower extremity edema and redness TECHNIQUE/EXAM DESCRIPTION AND NUMBER OF VIEWS:  CT scan of the LEFT lower extremity with contrast, with reconstructions. Thin helical 3 mm sections were obtained from the distal femur through the proximal tibia/fibula. Sagittal and coronal multiplanar reconstructions were generated from the axial images. A total of 100 mL of Omnipaque 350 nonionic contrast was administered  IV without complication. Up to date radiation dose reduction adjustments have been utilized to meet ALARA standards for radiation dose reduction. COMPARISON: 12/26/2019 FINDINGS: There is diffuse subcutaneous edema in the lower leg. The overlying skin is markedly thickened. There is curvilinear fluid deep to the fascia, but superficial to the gastrocnemius muscle extending from the knee joint to the ankle. Edema extends into the deeper fascial planes. No soft tissue gas is identified. No definite muscle necrosis is identified. No thrombus is identified. The Achilles tendon is intact. The bones are osteopenic. No cortical destruction is identified.     1.  Extensive subcutaneous edema with overlying skin thickening. Inflammation extends along the fascial planes with curvilinear fluid superficial to the gastrocnemius muscle. No soft tissue gas to suggest necrotizing fasciitis. 2.  Osteopenia    Us-renal    Result Date: 4/19/2020 4/19/2020 10:53 AM HISTORY/REASON FOR EXAM:  Acute renal failure TECHNIQUE/EXAM DESCRIPTION: Renal ultrasound. COMPARISON:  08/15/2018 FINDINGS: The right kidney measures 8.11 cm. The left kidney measures 9.17 cm. There is no hydronephrosis. There are no abnormal calcifications. Small right pleural  effusion is identified. The bladder demonstrates no focal wall abnormality.     1.  Right kidney appears smaller than left kidney which could be due to atrophy. 2.  No hydronephrosis. 3.  Incidentally noted small right pleural effusion.      Micro:  Results     Procedure Component Value Units Date/Time    SARS-CoV-2, PCR (In-House) [[id number]] Collected:  05/04/20 0948    Order Status:  Completed Updated:  05/04/20 1139     SARS-CoV-2 Source NP Swab     SARS-CoV-2 by PCR NotDetected     Comment: [facility] providers: PLEASE REFER TO DE-ESCALATION AND RETESTING PROTOCOL  on [email or web address]  **The [employer] GeneXpert Xpress SARS-CoV-2 Test has been made available for  use under the Emergency Use Authorization (EUA) only.         Narrative:       Contact  Rule-out COVID-19 panel, includes droplet/contact/eye  isolation order.  Check influenza to order this test only if  specifically indicated.  Please run rapid test, pt is pre op for today.    COVID/SARS CoV-2 [[id number]] Collected:  05/04/20 0948    Order Status:  Completed Specimen:  Respirate from Nasopharyngeal Updated:  05/04/20 1001     COVID Order Status Received    Narrative:       Contact  Rule-out COVID-19 panel, includes droplet/contact/eye  isolation order.  Check influenza to order this test only if  specifically indicated.  Please run rapid test, pt is pre op for today.          Assessment/Plan:  Left lower extremity cellulitis, complicated  Chronic venous stasis   Afebrile  Resolved leukocytosis  CT of the left lower extremity on 4/21 with no osteomyelitis or drainable abscess  s/p I&D on 4/22 down to muscle  S/p repeat I and D on 4/24 for repeat I&D of mult compartments and placement of wound VAC: skin at the margins necrotic  S/p repeat I&D and W[place] change on 4/27 by Dr. John  Blood culture negative  Wound cultures on 4/22 +GAS and MRSA  Intraoperative wound cx + pseudomonas aeruginosa and GAS  Repeat I&D on 5/4/2020-no skin graft due to necrosis  Re-eval  for graft 5/6  Continue linezolid and IV zosyn for now  Monitor CBC while on linezolid  Unclear stop date given continued necrosis-anticipate 7 days from date of skin graft    Methamphetamine abuse  Not a candidate for [facility]    Hepatitis C   status post treatment  No additional treatment at this time    LUPE, improved  Avoiding nephrotoxic agents      Prognosis for limb salvage guarded  DW Wound care

## 2020-05-05 NOTE — PROGRESS NOTES
Received bedside report from day RNRenee. Assumed care at 1900. Patient is awake/alert. Pt just came back from surgery. Assessment completed. Pt on 2 L of NC with  in place. Dressing is clean, dry and intact. Bed locked in lowest position. Call light within reach. Whiteboard updates with nurse's and CNA's numbers. Hourly rounding and post op vitals in place.

## 2020-05-05 NOTE — PROGRESS NOTES
DANE Bess rescheduled pt's PCP appmnt to 5/28 at 2:40 pm. Pt still admitted. Pt will be seeing Ruslan Marino M.D. at [facility] instead of PCP Marcela Ch M.D. PCP will be on maternity leave.

## 2020-05-05 NOTE — ANESTHESIA PROCEDURE NOTES
Airway  Date/Time: 5/4/2020 5:18 PM  Performed by: Juaquin Brownlee M.D.  Authorized by: Juaquin Brownlee M.D.     Location:  OR  Urgency:  Elective  Indications for Airway Management:  Anesthesia      Spontaneous Ventilation: absent    Sedation Level:  Deep  Preoxygenated: Yes    Mask Difficulty Assessment:  1 - vent by mask  Final Airway Type:  Supraglottic airway  Final Supraglottic Airway:  Standard LMA    SGA Size:  4  Number of Attempts at Approach:  1

## 2020-05-05 NOTE — OR NURSING
Pt's daughter Megan phoned and updated on pt status in Recovery and transfer back to room S531-01.

## 2020-05-05 NOTE — PROGRESS NOTES
"[facility] Medicine Daily Progress Note    Date of Service  5/5/2020    Chief Complaint  64 y.o. male admitted 4/16/2020 with Left leg redness and swelling.    Hospital Course    64 y.o. male who presented 4/16/2020 with left leg redness and swelling.  Patient has history of venous stasis dermatitis of both legs, and venous stasis ulcers of the left leg.  Patient states that he was discharged from wound care a month ago and said the ulcers had resolved.  But then a week ago he noted that ulcers came back he also noticed increasing redness and swelling and pain.  He denied having any fever or chills, cough or congestion, shortness of breath, abdominal pain, nausea or vomiting, or diarrhea.  On review of his previous wound cultures, he has history of MRSA, Pseudomonas, Serratia, and group B streptococcus.    Cultures on this admissioon grew MRSA and GAS. ID has been following his care. He was eventually transitioned to oral Zyvox. He was improving. His WBCs normalized, he remained afebrile, and he was looking stable for discharge home with antibiotics and wound care. However, after wound care changed the dressing 4/21, mian pus was noted from a tunneling lesion, suggesting need for further I&D.  Patient was taken to the operating room by Dr. Israel.  Initial reports from healthcare personnel suggest there was some component of \"necrotizing fat\".  Antibiotics were broadened slightly to include Unasyn.  The case was discussed with infectious disease who will be following closely.    Patient's undergone multiple washout procedures.  Cultures from deep tissue in the operating room have grown Pseudomonas and GAS.  Antibiotics have been extended accordingly and he is receiving Zyvox and Zosyn.    Interval Problem Update  Wound care at the bedside. Some oozing blood from open wound when dressing was removed. Plan for wound vac placement today and possible graft on Thursday. In good spirits and denies any concerns at this " time. Hemoglobin down to 6.9, denies dizziness or lightheadedness.     Consultants/Specialty  ID  LPS  Ortho    Code Status  full    Disposition  Pending medical and surgical clearance.     Review of Systems  Review of Systems   Constitutional: Negative for chills, fever and malaise/fatigue.   HENT: Negative for congestion and nosebleeds.    Eyes: Negative for blurred vision and photophobia.   Respiratory: Negative for cough and shortness of breath.    Cardiovascular: Negative for chest pain, palpitations and leg swelling.   Gastrointestinal: Negative for abdominal pain, nausea and vomiting.   Genitourinary: Negative for dysuria and frequency.   Musculoskeletal: Positive for myalgias (left LE). Negative for neck pain.   Skin: Negative for itching.   Neurological: Positive for sensory change. Negative for dizziness, tremors and headaches.   Psychiatric/Behavioral: The patient is not nervous/anxious and does not have insomnia.    All other systems reviewed and are negative.       Physical Exam  Temp:  [36 °C (96.8 °F)-37.2 °C (98.9 °F)] 36.7 °C (98.1 °F)  Pulse:  [] 70  Resp:  [12-20] 18  BP: ()/(66-97) 142/88  SpO2:  [91 %-100 %] 99 %    Physical Exam  Vitals signs reviewed.   Constitutional:       General: He is not in acute distress.     Interventions: Face mask in place.   HENT:      Head: Normocephalic and atraumatic.      Nose: Nose normal.   Eyes:      General: No scleral icterus.     Pupils: Pupils are equal, round, and reactive to light.   Neck:      Musculoskeletal: Normal range of motion. No neck rigidity.   Cardiovascular:      Rate and Rhythm: Normal rate and regular rhythm.   Pulmonary:      Effort: No respiratory distress.      Breath sounds: No wheezing.   Abdominal:      General: Bowel sounds are normal. There is no distension.      Palpations: Abdomen is soft.      Tenderness: There is no abdominal tenderness.   Musculoskeletal: Normal range of motion.      Right lower leg: No edema.       Left lower leg: No edema.      Comments: Left lower extremity with bandage in place, soaked with blood. Removed and examined open wound on lateral aspect of left leg. No purulent drainage.    Skin:     General: Skin is warm.      Coloration: Skin is pale.      Findings: No bruising.   Neurological:      General: No focal deficit present.      Mental Status: He is alert and oriented to person, place, and time.      Cranial Nerves: No cranial nerve deficit.      Motor: No weakness.   Psychiatric:         Mood and Affect: Mood normal.         Behavior: Behavior normal.         Fluids    Intake/Output Summary (Last 24 hours) at 5/5/2020 0731  Last data filed at 5/5/2020 0049  Gross per 24 hour   Intake 988 ml   Output 375 ml   Net 613 ml       Laboratory  Recent Labs     05/03/20  0045 05/04/20  0331 05/05/20  0405   WBC 8.0 8.2 8.3   RBC 2.50* 2.57* 2.16*   HEMOGLOBIN 8.0* 8.0* 6.9*   HEMATOCRIT 24.4* 24.4* 20.8*   MCV 97.6 94.9 96.3   MCH 32.0 31.1 31.9   MCHC 32.8* 32.8* 33.2*   RDW 53.8* 51.8* 52.6*   PLATELETCT 220 213 177   MPV 8.2* 8.2* 8.4*     Recent Labs     05/03/20  0045 05/05/20  0405   SODIUM 138 132*   POTASSIUM 5.1 4.4   CHLORIDE 103 100   CO2 23 20   GLUCOSE 97 101*   BUN 17 20   CREATININE 1.27 1.33   CALCIUM 8.2* 7.7*                   Imaging     Assessment/Plan  * Venous stasis ulcer of left lower extremity (HCC)- (present on admission)  Assessment & Plan  With surrounding cellulitis. Arterial US negative for arterial insufficiency  - S/p washout 4/22 and 4/24; I&D on 4/27  - ID and orthopedic surgery following, appreciate recs  - continue with Zyvox and Zosyn x7 days from date of skin graft  - possible graft placement on thursday  - s/p I&D on 5/4  - wound care    Hyperkalemia  Assessment & Plan  K normal today, down to 4.4  - monitor and correct as needed      Venous stasis dermatitis of both lower extremities- (present on admission)  Assessment & Plan  Chronic venous stasis of bilateral LE.   -  management as above    CKD (chronic kidney disease) stage 3, GFR 30-59 ml/min ([facility])- (present on admission)  Assessment & Plan  Acute on chronic. Back to baseline  - avoid nephrotoxic agents  - renally dose medications    Essential hypertension- (present on admission)  Assessment & Plan  Normotensive today.   - tolerating amlodipine    Constipation  Assessment & Plan  Last BM 5/2  - Continue bowel protocol    Normocytic anemia  Assessment & Plan  Likely anemia of chronic disease and in the setting of slow blood loss from his LE wound.   - transfuse 1U PRBC today  - transfuse if hemoglobin < 7  - continue to trend    Methamphetamine abuse ([facility])- (present on admission)  Assessment & Plan  Positive UDS for amphetamine, oxycodone and marijuana.  - continued counseling and education    Alcoholism ([facility])- (present on admission)  Assessment & Plan  - out of withdrawal window  - continued counseling and education  - correct electrolytes as needed    Anxiety- (present on admission)  Assessment & Plan  - continue home sertraline       VTE prophylaxis: SCDs on the non affected leg.

## 2020-05-05 NOTE — CARE PLAN
Problem: Communication  Goal: The ability to communicate needs accurately and effectively will improve  Outcome: PROGRESSING AS EXPECTED    Note: Pt uses call light and addresses needs appropriately.     Problem: Mobility  Goal: Risk for activity intolerance will decrease  Outcome: PROGRESSING AS EXPECTED     Note: Pt SBA. Pt not very mobile today w/wound care and blood transfusion.

## 2020-05-06 LAB
ANION GAP SERPL CALC-SCNC: 11 MMOL/L (ref 7–16)
BASOPHILS # BLD AUTO: 0.7 % (ref 0–1.8)
BASOPHILS # BLD: 0.05 K/UL (ref 0–0.12)
BUN SERPL-MCNC: 19 MG/DL (ref 8–22)
CALCIUM SERPL-MCNC: 7.8 MG/DL (ref 8.5–10.5)
CHLORIDE SERPL-SCNC: 101 MMOL/L (ref 96–112)
CO2 SERPL-SCNC: 23 MMOL/L (ref 20–33)
CREAT SERPL-MCNC: 1.2 MG/DL (ref 0.5–1.4)
EOSINOPHIL # BLD AUTO: 0.27 K/UL (ref 0–0.51)
EOSINOPHIL NFR BLD: 3.9 % (ref 0–6.9)
ERYTHROCYTE [DISTWIDTH] IN BLOOD BY AUTOMATED COUNT: 52.5 FL (ref 35.9–50)
GLUCOSE SERPL-MCNC: 116 MG/DL (ref 65–99)
HCT VFR BLD AUTO: 21.7 % (ref 42–52)
HGB BLD-MCNC: 7 G/DL (ref 14–18)
IMM GRANULOCYTES # BLD AUTO: 0.03 K/UL (ref 0–0.11)
IMM GRANULOCYTES NFR BLD AUTO: 0.4 % (ref 0–0.9)
LYMPHOCYTES # BLD AUTO: 1.12 K/UL (ref 1–4.8)
LYMPHOCYTES NFR BLD: 16.3 % (ref 22–41)
MCH RBC QN AUTO: 31.1 PG (ref 27–33)
MCHC RBC AUTO-ENTMCNC: 32.3 G/DL (ref 33.7–35.3)
MCV RBC AUTO: 96.4 FL (ref 81.4–97.8)
MONOCYTES # BLD AUTO: 0.81 K/UL (ref 0–0.85)
MONOCYTES NFR BLD AUTO: 11.8 % (ref 0–13.4)
NEUTROPHILS # BLD AUTO: 4.6 K/UL (ref 1.82–7.42)
NEUTROPHILS NFR BLD: 66.9 % (ref 44–72)
NRBC # BLD AUTO: 0 K/UL
NRBC BLD-RTO: 0 /100 WBC
PLATELET # BLD AUTO: 175 K/UL (ref 164–446)
PMV BLD AUTO: 8.7 FL (ref 9–12.9)
POTASSIUM SERPL-SCNC: 4.7 MMOL/L (ref 3.6–5.5)
RBC # BLD AUTO: 2.25 M/UL (ref 4.7–6.1)
SODIUM SERPL-SCNC: 135 MMOL/L (ref 135–145)
WBC # BLD AUTO: 6.9 K/UL (ref 4.8–10.8)

## 2020-05-06 PROCEDURE — 99233 SBSQ HOSP IP/OBS HIGH 50: CPT | Performed by: INTERNAL MEDICINE

## 2020-05-06 PROCEDURE — 99232 SBSQ HOSP IP/OBS MODERATE 35: CPT | Performed by: HOSPITALIST

## 2020-05-06 PROCEDURE — 700102 HCHG RX REV CODE 250 W/ 637 OVERRIDE(OP): Performed by: HOSPITALIST

## 2020-05-06 PROCEDURE — 770021 HCHG ROOM/CARE - ISO PRIVATE

## 2020-05-06 PROCEDURE — 11721 DEBRIDE NAIL 6 OR MORE: CPT

## 2020-05-06 PROCEDURE — 700111 HCHG RX REV CODE 636 W/ 250 OVERRIDE (IP): Performed by: INTERNAL MEDICINE

## 2020-05-06 PROCEDURE — A9270 NON-COVERED ITEM OR SERVICE: HCPCS | Performed by: INTERNAL MEDICINE

## 2020-05-06 PROCEDURE — A9270 NON-COVERED ITEM OR SERVICE: HCPCS | Performed by: HOSPITALIST

## 2020-05-06 PROCEDURE — 80048 BASIC METABOLIC PNL TOTAL CA: CPT

## 2020-05-06 PROCEDURE — 700102 HCHG RX REV CODE 250 W/ 637 OVERRIDE(OP): Performed by: INTERNAL MEDICINE

## 2020-05-06 PROCEDURE — 97606 NEG PRS WND THER DME>50 SQCM: CPT

## 2020-05-06 PROCEDURE — 700111 HCHG RX REV CODE 636 W/ 250 OVERRIDE (IP): Performed by: FAMILY MEDICINE

## 2020-05-06 PROCEDURE — 36415 COLL VENOUS BLD VENIPUNCTURE: CPT

## 2020-05-06 PROCEDURE — 85025 COMPLETE CBC W/AUTO DIFF WBC: CPT

## 2020-05-06 PROCEDURE — 700102 HCHG RX REV CODE 250 W/ 637 OVERRIDE(OP): Performed by: FAMILY MEDICINE

## 2020-05-06 PROCEDURE — A9270 NON-COVERED ITEM OR SERVICE: HCPCS | Performed by: FAMILY MEDICINE

## 2020-05-06 PROCEDURE — 700105 HCHG RX REV CODE 258: Performed by: HOSPITALIST

## 2020-05-06 PROCEDURE — 700102 HCHG RX REV CODE 250 W/ 637 OVERRIDE(OP): Performed by: NURSE PRACTITIONER

## 2020-05-06 PROCEDURE — 700105 HCHG RX REV CODE 258: Performed by: INTERNAL MEDICINE

## 2020-05-06 PROCEDURE — A9270 NON-COVERED ITEM OR SERVICE: HCPCS | Performed by: NURSE PRACTITIONER

## 2020-05-06 PROCEDURE — 700111 HCHG RX REV CODE 636 W/ 250 OVERRIDE (IP): Performed by: HOSPITALIST

## 2020-05-06 RX ADMIN — OXYCODONE 10 MG: 5 TABLET ORAL at 12:17

## 2020-05-06 RX ADMIN — PIPERACILLIN AND TAZOBACTAM 4.5 G: 4; .5 INJECTION, POWDER, LYOPHILIZED, FOR SOLUTION INTRAVENOUS; PARENTERAL at 12:16

## 2020-05-06 RX ADMIN — OXYCODONE 10 MG: 5 TABLET ORAL at 03:07

## 2020-05-06 RX ADMIN — LINEZOLID 600 MG: 600 TABLET, FILM COATED ORAL at 17:01

## 2020-05-06 RX ADMIN — SERTRALINE HYDROCHLORIDE 50 MG: 50 TABLET ORAL at 05:52

## 2020-05-06 RX ADMIN — MORPHINE SULFATE 4 MG: 4 INJECTION INTRAVENOUS at 14:55

## 2020-05-06 RX ADMIN — DOCUSATE SODIUM 50 MG AND SENNOSIDES 8.6 MG 2 TABLET: 8.6; 5 TABLET, FILM COATED ORAL at 17:01

## 2020-05-06 RX ADMIN — AMLODIPINE BESYLATE 10 MG: 10 TABLET ORAL at 05:52

## 2020-05-06 RX ADMIN — PIPERACILLIN AND TAZOBACTAM 4.5 G: 4; .5 INJECTION, POWDER, LYOPHILIZED, FOR SOLUTION INTRAVENOUS; PARENTERAL at 21:17

## 2020-05-06 RX ADMIN — FERROUS GLUCONATE 324 MG: 324 TABLET ORAL at 07:44

## 2020-05-06 RX ADMIN — PIPERACILLIN AND TAZOBACTAM 4.5 G: 4; .5 INJECTION, POWDER, LYOPHILIZED, FOR SOLUTION INTRAVENOUS; PARENTERAL at 05:50

## 2020-05-06 RX ADMIN — OXYCODONE 10 MG: 5 TABLET ORAL at 07:44

## 2020-05-06 RX ADMIN — ATORVASTATIN CALCIUM 40 MG: 40 TABLET, FILM COATED ORAL at 05:52

## 2020-05-06 RX ADMIN — DOCUSATE SODIUM 50 MG AND SENNOSIDES 8.6 MG 2 TABLET: 8.6; 5 TABLET, FILM COATED ORAL at 05:52

## 2020-05-06 RX ADMIN — LINEZOLID 600 MG: 600 TABLET, FILM COATED ORAL at 05:52

## 2020-05-06 RX ADMIN — OXYCODONE 10 MG: 5 TABLET ORAL at 21:16

## 2020-05-06 RX ADMIN — OXYCODONE 10 MG: 5 TABLET ORAL at 17:00

## 2020-05-06 ASSESSMENT — ENCOUNTER SYMPTOMS
DEPRESSION: 0
ABDOMINAL PAIN: 0
TREMORS: 0
NERVOUS/ANXIOUS: 0
PALPITATIONS: 0
COUGH: 0
FOCAL WEAKNESS: 0
HEADACHES: 0
NAUSEA: 0
VOMITING: 0
MYALGIAS: 1
INSOMNIA: 0
NECK PAIN: 0
SHORTNESS OF BREATH: 0
BLURRED VISION: 0
CHILLS: 0
DIZZINESS: 0
SENSORY CHANGE: 1
FEVER: 0
PHOTOPHOBIA: 0

## 2020-05-06 NOTE — CARE PLAN
Problem: Pain Management  Goal: Pain level will decrease to patient's comfort goal  Outcome: PROGRESSING AS EXPECTED  Note: Pt states pain is manageable w/prn oxy.     Problem: Respiratory:  Goal: Respiratory status will improve  Outcome: PROGRESSING AS EXPECTED  Note: Pt maintaining sats on room air.

## 2020-05-06 NOTE — WOUND TEAM
[facility]  Inpatient Services  Wound and Skin Care Follow Up    Admission Date: 4/16/2020     Last order of IP CONSULT TO WOUND CARE was found on 4/24/2020 from Hospital Encounter on 4/16/2020     HPI, PMH, SH: Reviewed    Unit where seen by Wound Team: [id number]     WOUND CONSULT/FOLLOW UP RELATED TO:  LLE Vac change to veraflo    Self Report / Pain Level:  Pre-medicated with IV medication. Minimal pain noted.       OBJECTIVE:  Compression dressing with abdominal pads and adaptic in place.    WOUND TYPE, LOCATION, CHARACTERISTICS (Pressure Injuries: location, stage, POA or date identified)     Negative Pressure Wound Therapy 05/05/20 Surgical (Active)   NPWT Pump Mode / Pressure Setting Intermittent;125 mmHg 5/6/2020  4:00 PM   Dressing Type Medium;Black Foam (Veraflo);Black Foam (Regular)    Number of Foam Pieces Used 2    Canister Changed No    NEXT Dressing Change/Treatment Date 05/08/20    VAC VeraFlo Irrigant Normal Saline    VAC VeraFlo Soak Time (mins) 5    VAC VeraFlo Instill Volume (ml) 22    VAC VeraFlo - Therapy Time (hrs) 2    VAC VeraFlo Pressure (mm/Hg) Intermittent;125 mmHg           Wound 05/05/20 Other (comment) Leg Lateral;Lower Left open incision (Active)   Wound Image   5/5/2020    Site Assessment Red;Bleeding;Early/partial granulation 5/6/2020     Periwound Assessment Intact    Margins Defined edges;Unattached edges    Closure Secondary intention    Drainage Amount Small    Drainage Description Sanguineous    Treatments Cleansed;Site care    Wound Cleansing Approved Wound Cleanser    Periwound Protectant Benzoin;Drape;Paste Ring    Dressing Cleansing/Solutions Not Applicable    Dressing Options Wound Vac    Dressing Changed New    Dressing Status Clean;Dry;Intact    Dressing Change/Treatment Frequency Tuesday, Thursday, Saturday, and As Needed    NEXT Dressing Change/Treatment Date 05/08/20    NEXT Weekly Photo (Inpatient Only) 05/12/20    Non-staged Wound Description Full  thickness    Wound Length (cm) 23.5 cm 5/5/2020    Wound Width (cm) 7.7 cm 5/5/2020   Wound Surface Area (cm^2) 180.95 cm^2 5/5/2020   Tunneling (cm) 6 cm 5/5/2020     Tunneling Clock Position of Wound 11 5/5/2020     Pulses Left;DP;PT;1+ 5/5/2020     Exposed Structures Muscle;Tendon 5/6/2020     WOUND NURSE ONLY - Time Spent with Patient (mins) 60        Vascular:    BHUPINDER:   No results found.      Lab Values:    Lab Results   Component Value Date/Time    WBC 6.9 05/06/2020 02:14 AM    RBC 2.25 (L) 05/06/2020 02:14 AM    HEMOGLOBIN 7.0 (L) 05/06/2020 02:14 AM    HEMATOCRIT 21.7 (L) 05/06/2020 02:14 AM    CREACTPROT 14.44 (H) 04/22/2020 12:49 AM    SEDRATEWES 119 (H) 05/04/2020 03:31 AM    HBA1C 5.7 (H) 01/23/2020 11:22 AM          Culture:   Obtained,   Culture Results show:  Recent Results (from the past 720 hour(s))   CULTURE WOUND W/ GRAM STAIN    Collection Time: 04/22/20 12:03 PM   Result Value Ref Range    Significant Indicator POS (POS)     Source WND     Site Left Leg     Culture Result - (A)     Gram Stain Result Moderate WBCs.  No organisms seen.       Culture Result (A)      Pseudomonas aeruginosa  Light growth  P.aeruginosa may develop resistance during prolonged therapy  with all antibiotics. Isolates that are initially susceptible  may become resistant within three to four days after  initiation of therapy. Testing of repeat isolates may be  warranted.      Culture Result (A)      Beta Hemolytic Streptococcus group A  Rare growth         Susceptibility    Pseudomonas aeruginosa - SHANON     Ceftazidime <=1 Sensitive mcg/mL     Ciprofloxacin >2 Resistant mcg/mL     Cefepime 4 Sensitive mcg/mL     Amikacin <=16 Sensitive mcg/mL     Gentamicin <=4 Sensitive mcg/mL     Tobramycin <=4 Sensitive mcg/mL     Meropenem <=1 Sensitive mcg/mL     Pip/Tazobactam 64 Sensitive mcg/mL         INTERVENTIONS BY WOUND TEAM:  Dressing removed from lateral wound, sealed posterior wound with drape and cut new hole for trac pad.  Lateral wound cleansed with wound cleanser. No active bleeding, no fluctuance noted. Marylou wound prepped with benzoin and paste ring. Veraflo sponge applied to wound bed and secured with drape, trac pad applied. Veraflo therapy initiated, instilling 22ml of NS soaking for 5 minutes every 2 hours.     Interdisciplinary consultation: Patient, Bedside RN (Stacie)    EVALUATION: Patient admitted with LLE redness and swelling, venous ulcers present, developed an abcess and gas trapped underneath surface, multiple I&D's and Necrotic tissue developed. Had to be excised on the lateral leg down to the fascia. LLE now presents with Lateral surgical incision wound and venous ulcers to the anterior leg/medial/posterior ankle. Need new pictures and measurements of venous ulcers on next dressing change. Due to large amounts of bleeding at the proximal site of lateral wound, no veraflo applied at this time. Venous ulcers will benefit from NPWT to manage drainage, assist in debridement, and properly heal the wound beds. Will continue regular NPWT on venous ulcer sites. Lateral incisional wound veraflow NPWT to maintain a moist wound bed, manage drainage, encourage granulation, and keep the wound clean.     Goals: Steady decrease in wound area and depth weekly.    NURSING PLAN OF CARE ORDERS (X):    Dressing changes: See Dressing Care orders: X  Skin care: See Skin Care orders: X  Rectal tube care: See Rectal Tube Care orders:   Other orders:      WOUND TEAM PLAN OF CARE:   Dressing changes by wound team:          Follow up 1-2 times weekly:               Follow up 3 times weekly:                NPWT change 3 times weekly:   X  Follow up as needed:       Other (explain):     Anticipated discharge plans:   LTACH:    X    SNF/Rehab:   X               Home Care:           Outpatient Wound Center:            Self Care:

## 2020-05-06 NOTE — PROGRESS NOTES
LIMB PRESERVATION SERVICE     64 y.o.  with a past medical history that includes hypertension, hepatitis C, kidney disease, polysubstance abuse, nondiabetic Charcot's deformity left foot, and chronic venous stasis ulcers bilateral lower extremities admitted 4/16/2020 for Cellulitis of left lower extremity.         NPO for possible surgery.    Surgery canceled.          PLAN  Regular diet  Wound team to change L lateral calf wound to VERAFLO if bleeding has subsided. Leave regular VAC to anterior and posteromedial ulcers.   Surgery rescheduled for 5/11/2020      D/W: Stacie ROGERS, Dr. John, Fozia Bear RN with wound team

## 2020-05-06 NOTE — PROGRESS NOTES
"[facility] Medicine Daily Progress Note    Date of Service  5/6/2020    Chief Complaint  64 y.o. male admitted 4/16/2020 with Left leg redness and swelling.    Hospital Course    64 y.o. male who presented 4/16/2020 with left leg redness and swelling.  Patient has history of venous stasis dermatitis of both legs, and venous stasis ulcers of the left leg.  Patient states that he was discharged from wound care a month ago and said the ulcers had resolved.  But then a week ago he noted that ulcers came back he also noticed increasing redness and swelling and pain.  He denied having any fever or chills, cough or congestion, shortness of breath, abdominal pain, nausea or vomiting, or diarrhea.  On review of his previous wound cultures, he has history of MRSA, Pseudomonas, Serratia, and group B streptococcus.    Cultures on this admissioon grew MRSA and GAS. ID has been following his care. He was eventually transitioned to oral Zyvox. He was improving. His WBCs normalized, he remained afebrile, and he was looking stable for discharge home with antibiotics and wound care. However, after wound care changed the dressing 4/21, mian pus was noted from a tunneling lesion, suggesting need for further I&D.  Patient was taken to the operating room by Dr. Israel.  Initial reports from healthcare personnel suggest there was some component of \"necrotizing fat\".  Antibiotics were broadened slightly to include Unasyn.  The case was discussed with infectious disease who will be following closely.    Patient's undergone multiple washout procedures.  Cultures from deep tissue in the operating room have grown Pseudomonas and GAS.  Antibiotics have been extended accordingly and he is receiving Zyvox and Zosyn.    Interval Problem Update  Continued to have bleeding from wound, now subsided. Hemoglobin at 7 this am, he denies any symptoms or concerns. Surgery moved to 5/11.     Consultants/Specialty  ID  LPS  Ortho    Code " Status  full    Disposition  Pending medical and surgical clearance.     Review of Systems  Review of Systems   Constitutional: Negative for chills, fever and malaise/fatigue.   HENT: Negative for congestion and nosebleeds.    Eyes: Negative for blurred vision and photophobia.   Respiratory: Negative for cough and shortness of breath.    Cardiovascular: Negative for chest pain, palpitations and leg swelling.   Gastrointestinal: Negative for abdominal pain, nausea and vomiting.   Genitourinary: Negative for dysuria and frequency.   Musculoskeletal: Positive for myalgias (left LE). Negative for neck pain.   Skin: Negative for itching.   Neurological: Positive for sensory change. Negative for dizziness, tremors, focal weakness and headaches.   Psychiatric/Behavioral: Negative for depression. The patient is not nervous/anxious and does not have insomnia.    All other systems reviewed and are negative.       Physical Exam  Temp:  [36.6 °C (97.8 °F)-37.4 °C (99.3 °F)] 36.6 °C (97.8 °F)  Pulse:  [64-77] 74  Resp:  [15-18] 18  BP: (105-151)/(66-79) 151/79  SpO2:  [92 %-100 %] 92 %    Physical Exam  Vitals signs reviewed.   Constitutional:       General: He is not in acute distress.     Interventions: Face mask in place.   HENT:      Head: Normocephalic.   Eyes:      General: No scleral icterus.     Pupils: Pupils are equal, round, and reactive to light.   Neck:      Musculoskeletal: Normal range of motion. No neck rigidity or muscular tenderness.   Cardiovascular:      Rate and Rhythm: Normal rate and regular rhythm.      Pulses: Normal pulses.   Pulmonary:      Effort: Pulmonary effort is normal. No respiratory distress.   Abdominal:      General: Bowel sounds are normal. There is no distension.      Palpations: Abdomen is soft.      Tenderness: There is no abdominal tenderness.   Musculoskeletal: Normal range of motion.      Right lower leg: No edema.      Left lower leg: No edema.      Comments: Left lower extremity with  bandage in place, and minimal blood/drainage.    Skin:     General: Skin is warm.      Coloration: Skin is pale.      Findings: No bruising.   Neurological:      General: No focal deficit present.      Mental Status: He is alert and oriented to person, place, and time.      Cranial Nerves: No cranial nerve deficit.      Motor: No weakness.   Psychiatric:         Mood and Affect: Mood normal.         Behavior: Behavior normal.         Fluids    Intake/Output Summary (Last 24 hours) at 5/6/2020 0725  Last data filed at 5/6/2020 0600  Gross per 24 hour   Intake 911 ml   Output 1000 ml   Net -89 ml       Laboratory  Recent Labs     05/04/20  0331 05/05/20  0405 05/06/20  0214   WBC 8.2 8.3 6.9   RBC 2.57* 2.16* 2.25*   HEMOGLOBIN 8.0* 6.9* 7.0*   HEMATOCRIT 24.4* 20.8* 21.7*   MCV 94.9 96.3 96.4   MCH 31.1 31.9 31.1   MCHC 32.8* 33.2* 32.3*   RDW 51.8* 52.6* 52.5*   PLATELETCT 213 177 175   MPV 8.2* 8.4* 8.7*     Recent Labs     05/05/20  0405 05/06/20  0214   SODIUM 132* 135   POTASSIUM 4.4 4.7   CHLORIDE 100 101   CO2 20 23   GLUCOSE 101* 116*   BUN 20 19   CREATININE 1.33 1.20   CALCIUM 7.7* 7.8*                   Imaging     Assessment/Plan  * Venous stasis ulcer of left lower extremity (HCC)- (present on admission)  Assessment & Plan  With surrounding cellulitis. Arterial US negative for arterial insufficiency.  - s/p washout 4/22, 4/24, and 4/27  - ID and orthopedic surgery following, appreciate recs  - continue with Zyvox and Zosyn x7 days from date of skin graft  - surgery changed to 5/11, tentative date  - s/p I&D on 5/4  - continue with wound care    Hyperkalemia  Assessment & Plan  K at 4.7  - monitor and correct as needed      Venous stasis dermatitis of both lower extremities- (present on admission)  Assessment & Plan  Chronic venous stasis of bilateral LE.   - management as above    CKD (chronic kidney disease) stage 3, GFR 30-59 ml/min ([facility])- (present on admission)  Assessment & Plan  Acute on chronic.  Back to baseline.  - avoid nephrotoxic agents  - renally dose medications    Essential hypertension- (present on admission)  Assessment & Plan  Normotensive.  - continue home amlodipine    Constipation  Assessment & Plan  Last BM 5/5  - Continue bowel protocol    Normocytic anemia  Assessment & Plan  Likely anemia of chronic disease and in the setting of slow blood loss from his LE wound.   - s/p 1U PRBC on 4/29 and 5/5  - transfuse if hemoglobin < 7  - continue to trend    Methamphetamine abuse (HCC)- (present on admission)  Assessment & Plan  Positive UDS for amphetamine, oxycodone and marijuana.  - continued counseling and education    Alcoholism (HCC)- (present on admission)  Assessment & Plan  - out of withdrawal window  - continued counseling and education  - correct electrolytes as needed    Anxiety- (present on admission)  Assessment & Plan  - continue home sertraline       VTE prophylaxis: SCDs on the non affected leg.

## 2020-05-06 NOTE — PROGRESS NOTES
Assumed care of pt at 0700. VSS, AAOX4. Plan to perform skin graft today. Plan of care reviewed w/pt. Hourly rounding in place. Will continue to monitor.

## 2020-05-06 NOTE — PROGRESS NOTES
Assumed care for patient at 1900, pt A&Ox4, sitting up in bed, bed in low and locked position, call bell in reach of patient, pt denies any pain or needs at this time.

## 2020-05-06 NOTE — PROGRESS NOTES
Infectious Disease Progress Note    Author: Mayte Fuller M.D. Date & Time of service: 5/6/2020  12:02 PM    Chief Complaint:  Follow-up for necrotizing LLE infection/cellulitis    Interval History:  64-year-old male with methamphetamine abuse, hepatitis C status post treatment, bilateral lower extremity venous stasis dermatitis with ulcerations, admitted 4/16/2020 with left leg cellulitis and fever  4/25 afebrile, white count 10.2, tolerating Zyvox and Zosyn.  Reports feeling better overall.  4/26 afebrile, white count 7.6, tolerating antibiotics with no new issues.  4/27 afebrile WBC 8.3 plan for repeat I&D today, denies pain, no new clinical issues  4/28 AF WBC 14.3 S/p repeat I&D yesterday. Wound vac removed given excessive bloody drainage. Rate pain 6-7/10 in severity.  4/30 AF WBC 7.5 had blood transfusion over night and feeling better. No new complaints. LLE dressed without any bloody drainage  5/1 AF WBC 7.3 LLE dressed-pain controlled States plan for skin graft possibly Mon. No new complaints  5/2 AF WBC 8 no new complaints-tolerated VAC change yesterday. Denies SE abx  5/3 AF no new complaints-plan for OR tomorrow  5/4 AF WBC 8.2-plan for OR still today-no time yet. No new complaints  5/5 AF WBC 8.3 seen with wound care-skin graft deferred due to continued necrosis and purulence ant tibial wound -possible placement tomorrow-denies SE abx-posterior calf wound healing well. Eating well  5/6 AF LPS note appreciated-surgery changed to 5/11-change VAC to Veraflo. Continues to tolerate abx well. No new complaints. Pain controlled    Labs Reviewed and Medications Reviewed.    Review of Systems:  Review of Systems   Constitutional: Negative for chills and fever.   Respiratory: Negative for cough and shortness of breath.    Cardiovascular: Negative for leg swelling.   Gastrointestinal: Negative for nausea and vomiting.   Skin: Negative for itching and rash.   All other systems reviewed and are  negative.      Hemodynamics:  Temp (24hrs), Av.9 °C (98.5 °F), Min:36.6 °C (97.8 °F), Max:37.4 °C (99.3 °F)  Temperature: 36.6 °C (97.8 °F)  Pulse  Av.9  Min: 54  Max: 127   Blood Pressure: 151/79       Physical Exam:  Physical Exam  Vitals signs and nursing note reviewed.   Constitutional:       General: He is not in acute distress.     Appearance: He is not ill-appearing, toxic-appearing or diaphoretic.      Comments: disheveled   HENT:      Nose: No congestion or rhinorrhea.      Mouth/Throat:      Pharynx: No oropharyngeal exudate.      Comments: Poor dentition  Eyes:      General:         Right eye: No discharge.         Left eye: No discharge.      Extraocular Movements: Extraocular movements intact.      Pupils: Pupils are equal, round, and reactive to light.   Neck:      Musculoskeletal: Neck supple. No muscular tenderness.   Cardiovascular:      Rate and Rhythm: Normal rate and regular rhythm.      Heart sounds: No murmur.   Pulmonary:      Effort: Pulmonary effort is normal. No respiratory distress.      Breath sounds: No wheezing or rhonchi.   Abdominal:      General: Abdomen is flat. There is no distension.      Palpations: Abdomen is soft. There is no mass.      Tenderness: There is no abdominal tenderness. There is no guarding.   Musculoskeletal:         General: Deformity present.      Comments: Left lower extremity dressed/VAC  Wound pictures reviewed  Wound 23 cm X 7 cm, no slough, dark blood   Skin:     General: Skin is warm.      Coloration: Skin is not jaundiced.      Findings: No rash.   Neurological:      General: No focal deficit present.      Mental Status: He is alert and oriented to person, place, and time.      Cranial Nerves: No cranial nerve deficit.   Psychiatric:         Mood and Affect: Mood normal.         Behavior: Behavior normal.         Meds:    Current Facility-Administered Medications:   •  silver nitrate  •  amLODIPine  •  diphenhydrAMINE  •  linezolid  •   senna-docusate  •  sertraline  •  polyethylene glycol/lytes  •  magnesium hydroxide  •  [COMPLETED] piperacillin-tazobactam **AND** piperacillin-tazobactam  •  atorvastatin  •  ferrous gluconate  •  acetaminophen  •  ondansetron  •  ondansetron  •  promethazine  •  promethazine  •  prochlorperazine  •  hydrALAZINE  •  oxyCODONE immediate-release  •  morphine injection    Labs:  Recent Labs     05/04/20  0331 05/05/20  0405 05/06/20  0214   WBC 8.2 8.3 6.9   RBC 2.57* 2.16* 2.25*   HEMOGLOBIN 8.0* 6.9* 7.0*   HEMATOCRIT 24.4* 20.8* 21.7*   MCV 94.9 96.3 96.4   MCH 31.1 31.9 31.1   RDW 51.8* 52.6* 52.5*   PLATELETCT 213 177 175   MPV 8.2* 8.4* 8.7*   NEUTSPOLYS 77.60* 75.50* 66.90   LYMPHOCYTES 11.50* 11.80* 16.30*   MONOCYTES 8.30 9.40 11.80   EOSINOPHILS 1.50 2.00 3.90   BASOPHILS 0.60 0.50 0.70     Recent Labs     05/05/20  0405 05/06/20  0214   SODIUM 132* 135   POTASSIUM 4.4 4.7   CHLORIDE 100 101   CO2 20 23   GLUCOSE 101* 116*   BUN 20 19     Recent Labs     05/05/20  0405 05/06/20  0214   CREATININE 1.33 1.20       Imaging:  Ct-extremity, Lower With Left    Result Date: 4/21/2020 4/21/2020 5:16 PM HISTORY/REASON FOR EXAM:  Lower leg pain, suspected osteomyelitis. TECHNIQUE/EXAM DESCRIPTION AND NUMBER OF VIEWS:  CT scan of the LEFT lower extremity with contrast, with reconstructions. Thin helical 3 mm sections were obtained from the distal femur through the proximal tibia/fibula. Sagittal and coronal multiplanar reconstructions were generated from the axial images. A total of 100 mL of Omnipaque 350 nonionic contrast was administered  IV without complication. Up to date radiation dose reduction adjustments have been utilized to meet ALARA standards for radiation dose reduction. COMPARISON: 4/16/2020. FINDINGS: No acute fracture or dislocation is identified. Cellulitis of the lower leg, with soft tissue prominence and subcutaneous air. No subjacent cortical bone destruction to suggest osteomyelitis. No  drainable abscess. There is chronic appearing valgus deviation of the foot at the subtalar joint, and there is suspected pes planovalgus. Moderate subtalar osteoarthrosis. Multifocal mild to moderate osteoarthrosis in the midfoot. Mild multifocal interphalangeal osteoarthrosis. Reactive popliteal lymphadenopathy.     1. No evidence of osteomyelitis. 2. Cellulitis and soft tissue organs of the left lower leg. Subcutaneous air without drainable abscess. 3. Suspected pes planovalgus deformity. Multifocal osteoarthrosis of the midfoot.    Ct-extremity, Lower With Left    Result Date: 4/16/2020 4/16/2020 1:45 PM HISTORY/REASON FOR EXAM:  Lower leg swelling/redness, cellulitis suspected. Left lower extremity edema and redness TECHNIQUE/EXAM DESCRIPTION AND NUMBER OF VIEWS:  CT scan of the LEFT lower extremity with contrast, with reconstructions. Thin helical 3 mm sections were obtained from the distal femur through the proximal tibia/fibula. Sagittal and coronal multiplanar reconstructions were generated from the axial images. A total of 100 mL of Omnipaque 350 nonionic contrast was administered  IV without complication. Up to date radiation dose reduction adjustments have been utilized to meet ALARA standards for radiation dose reduction. COMPARISON: 12/26/2019 FINDINGS: There is diffuse subcutaneous edema in the lower leg. The overlying skin is markedly thickened. There is curvilinear fluid deep to the fascia, but superficial to the gastrocnemius muscle extending from the knee joint to the ankle. Edema extends into the deeper fascial planes. No soft tissue gas is identified. No definite muscle necrosis is identified. No thrombus is identified. The Achilles tendon is intact. The bones are osteopenic. No cortical destruction is identified.     1.  Extensive subcutaneous edema with overlying skin thickening. Inflammation extends along the fascial planes with curvilinear fluid superficial to the gastrocnemius muscle. No  soft tissue gas to suggest necrotizing fasciitis. 2.  Osteopenia    Us-renal    Result Date: 4/19/2020 4/19/2020 10:53 AM HISTORY/REASON FOR EXAM:  Acute renal failure TECHNIQUE/EXAM DESCRIPTION: Renal ultrasound. COMPARISON:  08/15/2018 FINDINGS: The right kidney measures 8.11 cm. The left kidney measures 9.17 cm. There is no hydronephrosis. There are no abnormal calcifications. Small right pleural effusion is identified. The bladder demonstrates no focal wall abnormality.     1.  Right kidney appears smaller than left kidney which could be due to atrophy. 2.  No hydronephrosis. 3.  Incidentally noted small right pleural effusion.      Micro:  Results     Procedure Component Value Units Date/Time    SARS-CoV-2, PCR (In-House) [[id number]] Collected:  05/04/20 0948    Order Status:  Completed Updated:  05/04/20 1139     SARS-CoV-2 Source NP Swab     SARS-CoV-2 by PCR NotDetected     Comment: [facility] providers: PLEASE REFER TO DE-ESCALATION AND RETESTING PROTOCOL  on [email or web address]  **The [employer] GeneXpert Xpress SARS-CoV-2 Test has been made available for  use under the Emergency Use Authorization (EUA) only.         Narrative:       Contact  Rule-out COVID-19 panel, includes droplet/contact/eye  isolation order.  Check influenza to order this test only if  specifically indicated.  Please run rapid test, pt is pre op for today.    COVID/SARS CoV-2 [[id number]] Collected:  05/04/20 0948    Order Status:  Completed Specimen:  Respirate from Nasopharyngeal Updated:  05/04/20 1001     COVID Order Status Received    Narrative:       Contact  Rule-out COVID-19 panel, includes droplet/contact/eye  isolation order.  Check influenza to order this test only if  specifically indicated.  Please run rapid test, pt is pre op for today.          Assessment/Plan:  Left lower extremity cellulitis, complicated  Chronic venous stasis   Afebrile  Resolved leukocytosis  CT of the left lower extremity on 4/21 with no osteomyelitis or  drainable abscess  s/p I&D on 4/22 down to muscle  S/p repeat I and D on 4/24 for repeat I&D of mult compartments and placement of wound VAC: skin at the margins necrotic  S/p repeat I&D and WV change on 4/27 by Dr. John  Blood culture negative  Wound cultures on 4/22 +GAS and MRSA  Intraoperative wound cx + pseudomonas aeruginosa and GAS  Repeat I&D on 5/4/2020-no skin graft due to necrosis  Re-eval for graft 5/6  Continue linezolid and IV zosyn for now  Monitor CBC while on linezolid  Unclear stop date given continued necrosis-anticipate 7 days from date of skin graft    Methamphetamine abuse  Not a candidate for [facility]    Hepatitis C   status post treatment  No additional treatment at this time    LUPE, improved  Avoiding nephrotoxic agents      Prognosis for limb salvage guarded

## 2020-05-06 NOTE — CARE PLAN
Problem: Communication  Goal: The ability to communicate needs accurately and effectively will improve  Outcome: PROGRESSING AS EXPECTED     Problem: Safety  Goal: Will remain free from injury  Outcome: PROGRESSING AS EXPECTED  Note: Patient uses the call bell appropriately, call bell in reach of patient, bed in low and locked position, non skid socks on patient.

## 2020-05-07 LAB
ANION GAP SERPL CALC-SCNC: 9 MMOL/L (ref 7–16)
BASOPHILS # BLD AUTO: 0.8 % (ref 0–1.8)
BASOPHILS # BLD: 0.06 K/UL (ref 0–0.12)
BUN SERPL-MCNC: 19 MG/DL (ref 8–22)
CALCIUM SERPL-MCNC: 8.3 MG/DL (ref 8.5–10.5)
CHLORIDE SERPL-SCNC: 101 MMOL/L (ref 96–112)
CO2 SERPL-SCNC: 23 MMOL/L (ref 20–33)
CREAT SERPL-MCNC: 1.2 MG/DL (ref 0.5–1.4)
EOSINOPHIL # BLD AUTO: 0.32 K/UL (ref 0–0.51)
EOSINOPHIL NFR BLD: 4.3 % (ref 0–6.9)
ERYTHROCYTE [DISTWIDTH] IN BLOOD BY AUTOMATED COUNT: 52.8 FL (ref 35.9–50)
ERYTHROCYTE [SEDIMENTATION RATE] IN BLOOD BY WESTERGREN METHOD: 103 MM/HOUR (ref 0–20)
GLUCOSE SERPL-MCNC: 102 MG/DL (ref 65–99)
HCT VFR BLD AUTO: 22.2 % (ref 42–52)
HGB BLD-MCNC: 7.2 G/DL (ref 14–18)
IMM GRANULOCYTES # BLD AUTO: 0.03 K/UL (ref 0–0.11)
IMM GRANULOCYTES NFR BLD AUTO: 0.4 % (ref 0–0.9)
LYMPHOCYTES # BLD AUTO: 1.28 K/UL (ref 1–4.8)
LYMPHOCYTES NFR BLD: 17.4 % (ref 22–41)
MCH RBC QN AUTO: 31.4 PG (ref 27–33)
MCHC RBC AUTO-ENTMCNC: 32.4 G/DL (ref 33.7–35.3)
MCV RBC AUTO: 96.9 FL (ref 81.4–97.8)
MONOCYTES # BLD AUTO: 0.89 K/UL (ref 0–0.85)
MONOCYTES NFR BLD AUTO: 12.1 % (ref 0–13.4)
NEUTROPHILS # BLD AUTO: 4.79 K/UL (ref 1.82–7.42)
NEUTROPHILS NFR BLD: 65 % (ref 44–72)
NRBC # BLD AUTO: 0 K/UL
NRBC BLD-RTO: 0 /100 WBC
PLATELET # BLD AUTO: 188 K/UL (ref 164–446)
PMV BLD AUTO: 8.6 FL (ref 9–12.9)
POTASSIUM SERPL-SCNC: 4.9 MMOL/L (ref 3.6–5.5)
RBC # BLD AUTO: 2.29 M/UL (ref 4.7–6.1)
SODIUM SERPL-SCNC: 133 MMOL/L (ref 135–145)
WBC # BLD AUTO: 7.4 K/UL (ref 4.8–10.8)

## 2020-05-07 PROCEDURE — 99233 SBSQ HOSP IP/OBS HIGH 50: CPT | Performed by: INTERNAL MEDICINE

## 2020-05-07 PROCEDURE — 99232 SBSQ HOSP IP/OBS MODERATE 35: CPT | Performed by: HOSPITALIST

## 2020-05-07 PROCEDURE — A9270 NON-COVERED ITEM OR SERVICE: HCPCS | Performed by: NURSE PRACTITIONER

## 2020-05-07 PROCEDURE — A9270 NON-COVERED ITEM OR SERVICE: HCPCS | Performed by: INTERNAL MEDICINE

## 2020-05-07 PROCEDURE — 700102 HCHG RX REV CODE 250 W/ 637 OVERRIDE(OP): Performed by: NURSE PRACTITIONER

## 2020-05-07 PROCEDURE — 700102 HCHG RX REV CODE 250 W/ 637 OVERRIDE(OP): Performed by: INTERNAL MEDICINE

## 2020-05-07 PROCEDURE — 700102 HCHG RX REV CODE 250 W/ 637 OVERRIDE(OP): Performed by: HOSPITALIST

## 2020-05-07 PROCEDURE — 306263 VAC CANNISTER W/GEL 500ML: Performed by: HOSPITALIST

## 2020-05-07 PROCEDURE — A9270 NON-COVERED ITEM OR SERVICE: HCPCS | Performed by: FAMILY MEDICINE

## 2020-05-07 PROCEDURE — 36415 COLL VENOUS BLD VENIPUNCTURE: CPT

## 2020-05-07 PROCEDURE — 700111 HCHG RX REV CODE 636 W/ 250 OVERRIDE (IP): Performed by: INTERNAL MEDICINE

## 2020-05-07 PROCEDURE — 85025 COMPLETE CBC W/AUTO DIFF WBC: CPT

## 2020-05-07 PROCEDURE — 700102 HCHG RX REV CODE 250 W/ 637 OVERRIDE(OP): Performed by: FAMILY MEDICINE

## 2020-05-07 PROCEDURE — 700105 HCHG RX REV CODE 258: Performed by: INTERNAL MEDICINE

## 2020-05-07 PROCEDURE — A9270 NON-COVERED ITEM OR SERVICE: HCPCS | Performed by: HOSPITALIST

## 2020-05-07 PROCEDURE — 80048 BASIC METABOLIC PNL TOTAL CA: CPT

## 2020-05-07 PROCEDURE — 85652 RBC SED RATE AUTOMATED: CPT

## 2020-05-07 PROCEDURE — 770021 HCHG ROOM/CARE - ISO PRIVATE

## 2020-05-07 RX ADMIN — OXYCODONE 10 MG: 5 TABLET ORAL at 13:31

## 2020-05-07 RX ADMIN — AMLODIPINE BESYLATE 10 MG: 10 TABLET ORAL at 04:45

## 2020-05-07 RX ADMIN — PIPERACILLIN AND TAZOBACTAM 4.5 G: 4; .5 INJECTION, POWDER, LYOPHILIZED, FOR SOLUTION INTRAVENOUS; PARENTERAL at 04:46

## 2020-05-07 RX ADMIN — PIPERACILLIN AND TAZOBACTAM 4.5 G: 4; .5 INJECTION, POWDER, LYOPHILIZED, FOR SOLUTION INTRAVENOUS; PARENTERAL at 21:30

## 2020-05-07 RX ADMIN — OXYCODONE 10 MG: 5 TABLET ORAL at 08:28

## 2020-05-07 RX ADMIN — LINEZOLID 600 MG: 600 TABLET, FILM COATED ORAL at 04:44

## 2020-05-07 RX ADMIN — LINEZOLID 600 MG: 600 TABLET, FILM COATED ORAL at 17:56

## 2020-05-07 RX ADMIN — DIPHENHYDRAMINE HYDROCHLORIDE 25 MG: 25 TABLET ORAL at 19:36

## 2020-05-07 RX ADMIN — OXYCODONE 10 MG: 5 TABLET ORAL at 17:56

## 2020-05-07 RX ADMIN — FERROUS GLUCONATE 324 MG: 324 TABLET ORAL at 08:28

## 2020-05-07 RX ADMIN — OXYCODONE 10 MG: 5 TABLET ORAL at 22:35

## 2020-05-07 RX ADMIN — PIPERACILLIN AND TAZOBACTAM 4.5 G: 4; .5 INJECTION, POWDER, LYOPHILIZED, FOR SOLUTION INTRAVENOUS; PARENTERAL at 13:31

## 2020-05-07 RX ADMIN — ATORVASTATIN CALCIUM 40 MG: 40 TABLET, FILM COATED ORAL at 04:44

## 2020-05-07 RX ADMIN — OXYCODONE 10 MG: 5 TABLET ORAL at 02:58

## 2020-05-07 RX ADMIN — DOCUSATE SODIUM 50 MG AND SENNOSIDES 8.6 MG 2 TABLET: 8.6; 5 TABLET, FILM COATED ORAL at 17:56

## 2020-05-07 RX ADMIN — DIPHENHYDRAMINE HYDROCHLORIDE 25 MG: 25 TABLET ORAL at 00:08

## 2020-05-07 RX ADMIN — SERTRALINE HYDROCHLORIDE 50 MG: 50 TABLET ORAL at 04:45

## 2020-05-07 ASSESSMENT — ENCOUNTER SYMPTOMS
NERVOUS/ANXIOUS: 0
DIZZINESS: 0
PHOTOPHOBIA: 0
SENSORY CHANGE: 1
FEVER: 0
SHORTNESS OF BREATH: 0
CHILLS: 0
HEADACHES: 0
NECK PAIN: 0
MYALGIAS: 1
VOMITING: 0
COUGH: 0
FOCAL WEAKNESS: 0
DEPRESSION: 0
BLURRED VISION: 0
NAUSEA: 0
TREMORS: 0
PALPITATIONS: 0
INSOMNIA: 0
ABDOMINAL PAIN: 0

## 2020-05-07 NOTE — PROGRESS NOTES
Assumed care at 0700, report received from night shift RN.  Pt is sitting up in bed, awake. A&O x4, on RA, denies any pain at this time. Bed is locked and in the lowest position. Call light and belongings within reach. Plan of care discussed and whiteboard updated, Pt has no questions at this time.

## 2020-05-07 NOTE — PROGRESS NOTES
Infectious Disease Progress Note    Author: Mayte Fuller M.D. Date & Time of service: 2020  11:38 AM    Chief Complaint:  Follow-up for necrotizing LLE infection/cellulitis    Interval History:  64-year-old male with methamphetamine abuse, hepatitis C status post treatment, bilateral lower extremity venous stasis dermatitis with ulcerations, admitted 2020 with left leg cellulitis and fever   afebrile, white count 10.2, tolerating Zyvox and Zosyn.  Reports feeling better overall.   afebrile, white count 7.6, tolerating antibiotics with no new issues.   afebrile WBC 8.3 plan for repeat I&D today, denies pain, no new clinical issues   AF WBC 14.3 S/p repeat I&D yesterday. Wound vac removed given excessive bloody drainage. Rate pain 6-7/10 in severity.   AF WBC 7.5 had blood transfusion over night and feeling better. No new complaints. LLE dressed without any bloody drainage   AF WBC 7.3 LLE dressed-pain controlled States plan for skin graft possibly Mon. No new complaints   AF WBC 8 no new complaints-tolerated VAC change yesterday. Denies SE abx  5/3 AF no new complaints-plan for OR tomorrow   AF WBC 8.2-plan for OR still today-no time yet. No new complaints   AF WBC 8.3 seen with wound care-skin graft deferred due to continued necrosis and purulence ant tibial wound -possible placement tomorrow-denies SE abx-posterior calf wound healing well. Eating well  / AF LPS note appreciated-surgery changed to -change VAC to Veraflo. Continues to tolerate abx well. No new complaints. Pain controlled   AF WBC 7.4 sleeping soundly at time of rounds. No acute events    Labs Reviewed and Medications Reviewed.    Review of Systems:  Review of Systems   Unable to perform ROS: Other   Constitutional: Negative for fever.   Respiratory: Negative for shortness of breath.    Skin: Negative for rash.       Hemodynamics:  Temp (24hrs), Av.8 °C (98.3 °F), Min:36.3 °C (97.4 °F),  Max:37.2 °C (98.9 °F)  Temperature: 36.3 °C (97.4 °F)  Pulse  Av.8  Min: 54  Max: 127   Blood Pressure: 112/65       Physical Exam:  Physical Exam  Vitals signs and nursing note reviewed.   Constitutional:       General: He is not in acute distress.     Appearance: He is not ill-appearing, toxic-appearing or diaphoretic.      Comments: disheveled   HENT:      Nose: No rhinorrhea.      Mouth/Throat:      Comments: Poor dentition  Eyes:      General:         Right eye: No discharge.         Left eye: No discharge.   Cardiovascular:      Rate and Rhythm: Normal rate.   Pulmonary:      Effort: Pulmonary effort is normal. No respiratory distress.      Breath sounds: No stridor. No wheezing or rhonchi.   Abdominal:      General: There is no distension.      Palpations: Abdomen is soft.   Musculoskeletal:         General: Deformity present.      Comments: Left lower extremity dressed/VAC  Wound pictures reviewed  Wound 23 cm X 7 cm, no slough, dark blood   Lymphadenopathy:      Cervical: No cervical adenopathy.   Skin:     General: Skin is warm.      Coloration: Skin is not jaundiced.      Findings: No rash.   Neurological:      Cranial Nerves: No cranial nerve deficit.         Meds:    Current Facility-Administered Medications:   •  silver nitrate  •  amLODIPine  •  diphenhydrAMINE  •  linezolid  •  senna-docusate  •  sertraline  •  polyethylene glycol/lytes  •  magnesium hydroxide  •  [COMPLETED] piperacillin-tazobactam **AND** piperacillin-tazobactam  •  atorvastatin  •  ferrous gluconate  •  acetaminophen  •  ondansetron  •  ondansetron  •  promethazine  •  promethazine  •  prochlorperazine  •  hydrALAZINE  •  oxyCODONE immediate-release  •  morphine injection    Labs:  Recent Labs     20  0405 20  0214 20  0214   WBC 8.3 6.9 7.4   RBC 2.16* 2.25* 2.29*   HEMOGLOBIN 6.9* 7.0* 7.2*   HEMATOCRIT 20.8* 21.7* 22.2*   MCV 96.3 96.4 96.9   MCH 31.9 31.1 31.4   RDW 52.6* 52.5* 52.8*   PLATELETCT 177 175  188   MPV 8.4* 8.7* 8.6*   NEUTSPOLYS 75.50* 66.90 65.00   LYMPHOCYTES 11.80* 16.30* 17.40*   MONOCYTES 9.40 11.80 12.10   EOSINOPHILS 2.00 3.90 4.30   BASOPHILS 0.50 0.70 0.80     Recent Labs     05/05/20  0405 05/06/20  0214 05/07/20  0214   SODIUM 132* 135 133*   POTASSIUM 4.4 4.7 4.9   CHLORIDE 100 101 101   CO2 20 23 23   GLUCOSE 101* 116* 102*   BUN 20 19 19     Recent Labs     05/05/20  0405 05/06/20  0214 05/07/20  0214   CREATININE 1.33 1.20 1.20       Imaging:  Ct-extremity, Lower With Left    Result Date: 4/21/2020 4/21/2020 5:16 PM HISTORY/REASON FOR EXAM:  Lower leg pain, suspected osteomyelitis. TECHNIQUE/EXAM DESCRIPTION AND NUMBER OF VIEWS:  CT scan of the LEFT lower extremity with contrast, with reconstructions. Thin helical 3 mm sections were obtained from the distal femur through the proximal tibia/fibula. Sagittal and coronal multiplanar reconstructions were generated from the axial images. A total of 100 mL of Omnipaque 350 nonionic contrast was administered  IV without complication. Up to date radiation dose reduction adjustments have been utilized to meet ALARA standards for radiation dose reduction. COMPARISON: 4/16/2020. FINDINGS: No acute fracture or dislocation is identified. Cellulitis of the lower leg, with soft tissue prominence and subcutaneous air. No subjacent cortical bone destruction to suggest osteomyelitis. No drainable abscess. There is chronic appearing valgus deviation of the foot at the subtalar joint, and there is suspected pes planovalgus. Moderate subtalar osteoarthrosis. Multifocal mild to moderate osteoarthrosis in the midfoot. Mild multifocal interphalangeal osteoarthrosis. Reactive popliteal lymphadenopathy.     1. No evidence of osteomyelitis. 2. Cellulitis and soft tissue organs of the left lower leg. Subcutaneous air without drainable abscess. 3. Suspected pes planovalgus deformity. Multifocal osteoarthrosis of the midfoot.    Ct-extremity, Lower With  Left    Result Date: 4/16/2020 4/16/2020 1:45 PM HISTORY/REASON FOR EXAM:  Lower leg swelling/redness, cellulitis suspected. Left lower extremity edema and redness TECHNIQUE/EXAM DESCRIPTION AND NUMBER OF VIEWS:  CT scan of the LEFT lower extremity with contrast, with reconstructions. Thin helical 3 mm sections were obtained from the distal femur through the proximal tibia/fibula. Sagittal and coronal multiplanar reconstructions were generated from the axial images. A total of 100 mL of Omnipaque 350 nonionic contrast was administered  IV without complication. Up to date radiation dose reduction adjustments have been utilized to meet ALARA standards for radiation dose reduction. COMPARISON: 12/26/2019 FINDINGS: There is diffuse subcutaneous edema in the lower leg. The overlying skin is markedly thickened. There is curvilinear fluid deep to the fascia, but superficial to the gastrocnemius muscle extending from the knee joint to the ankle. Edema extends into the deeper fascial planes. No soft tissue gas is identified. No definite muscle necrosis is identified. No thrombus is identified. The Achilles tendon is intact. The bones are osteopenic. No cortical destruction is identified.     1.  Extensive subcutaneous edema with overlying skin thickening. Inflammation extends along the fascial planes with curvilinear fluid superficial to the gastrocnemius muscle. No soft tissue gas to suggest necrotizing fasciitis. 2.  Osteopenia    Us-renal    Result Date: 4/19/2020 4/19/2020 10:53 AM HISTORY/REASON FOR EXAM:  Acute renal failure TECHNIQUE/EXAM DESCRIPTION: Renal ultrasound. COMPARISON:  08/15/2018 FINDINGS: The right kidney measures 8.11 cm. The left kidney measures 9.17 cm. There is no hydronephrosis. There are no abnormal calcifications. Small right pleural effusion is identified. The bladder demonstrates no focal wall abnormality.     1.  Right kidney appears smaller than left kidney which could be due to atrophy. 2.   No hydronephrosis. 3.  Incidentally noted small right pleural effusion.      Micro:  Results     Procedure Component Value Units Date/Time    SARS-CoV-2, PCR (In-House) [[id number]] Collected:  05/04/20 0948    Order Status:  Completed Updated:  05/04/20 1139     SARS-CoV-2 Source NP Swab     SARS-CoV-2 by PCR NotDetected     Comment: [facility] providers: PLEASE REFER TO DE-ESCALATION AND RETESTING PROTOCOL  on [email or web address]  **The [employer] GeneXpert Xpress SARS-CoV-2 Test has been made available for  use under the Emergency Use Authorization (EUA) only.         Narrative:       Contact  Rule-out COVID-19 panel, includes droplet/contact/eye  isolation order.  Check influenza to order this test only if  specifically indicated.  Please run rapid test, pt is pre op for today.    COVID/SARS CoV-2 [[id number]] Collected:  05/04/20 0948    Order Status:  Completed Specimen:  Respirate from Nasopharyngeal Updated:  05/04/20 1001     COVID Order Status Received    Narrative:       Contact  Rule-out COVID-19 panel, includes droplet/contact/eye  isolation order.  Check influenza to order this test only if  specifically indicated.  Please run rapid test, pt is pre op for today.          Assessment/Plan:  Left lower extremity cellulitis, complicated  Chronic venous stasis   Afebrile  Resolved leukocytosis  CT LLE on 4/21 with no OM or drainable abscess  s/p I&D on 4/22 down to muscle  S/p repeat I and D on 4/24 of mult compartments and placement of wound VAC: skin at the margins necrotic  S/p repeat I&D and WV change on 4/27 by Dr. John  Blood culture negative  Wound cultures on 4/22 +GAS and MRSA  Intraoperative wound cx + pseudomonas aeruginosa and GAS  Repeat I&D on 5/4/2020-no skin graft due to necrosis  Continue linezolid and IV zosyn for now  Monitor CBC while on linezolid  New planned graft date 5/11/2020  Unclear stop date given continued necrosis-anticipate 7 days from date of skin graft    Methamphetamine abuse  Not a  candidate for [facility]    Hepatitis C   status post treatment  No additional treatment at this time    LUPE, improved  Avoiding nephrotoxic agents      Prognosis for limb salvage guarded

## 2020-05-07 NOTE — PROGRESS NOTES
Assumed care at 1900, report received from marek ROGERS.  Pt A&O x 4, states pain is 05/10 in left leg. Bed locked, 2 rails up, bed in lowest position. Call light in place, belongings at bedside, no needs at this time and hourly rounding in place.     Patient information on fall and injury prevention

## 2020-05-07 NOTE — CARE PLAN
Problem: Communication  Goal: The ability to communicate needs accurately and effectively will improve  5/7/2020 0441 by Kristine Harris R.N.  Outcome: PROGRESSING AS EXPECTED  Intervention: Educate patient and significant other/support system about the plan of care, procedures, treatments, medications and allow for questions  Note: Pt verbalizes understanding, all questions answered.      Problem: Safety  Goal: Will remain free from injury  5/7/2020 0441 by Kristine Harris R.N.  Outcome: PROGRESSING AS EXPECTED  Intervention: Educate patient and significant other/support system about adaptive mobility strategies and safe transfers  Note: Pt educated, all questions answered.

## 2020-05-07 NOTE — WOUND TEAM
Pt seen for foot-finger nail care. Palpated DP pulses, 1+. Toenails and fingernails noted to be overgrown and mycotic. Fingers and toes wrapped with soapy, wet washcloths prior to trimming and filing of nails. Trimming and filing completed without difficulty, no knicks or cuts. Moisturizer applied to both feet prior to replacing slipper-sock to R foot, L foot has Tubi- in place.

## 2020-05-07 NOTE — PROGRESS NOTES
"[facility] Medicine Daily Progress Note    Date of Service  5/7/2020    Chief Complaint  64 y.o. male admitted 4/16/2020 with Left leg redness and swelling.    Hospital Course    64 y.o. male who presented 4/16/2020 with left leg redness and swelling.  Patient has history of venous stasis dermatitis of both legs, and venous stasis ulcers of the left leg.  Patient states that he was discharged from wound care a month ago and said the ulcers had resolved.  But then a week ago he noted that ulcers came back he also noticed increasing redness and swelling and pain.  He denied having any fever or chills, cough or congestion, shortness of breath, abdominal pain, nausea or vomiting, or diarrhea.  On review of his previous wound cultures, he has history of MRSA, Pseudomonas, Serratia, and group B streptococcus.    Cultures on this admissioon grew MRSA and GAS. ID has been following his care. He was eventually transitioned to oral Zyvox. He was improving. His WBCs normalized, he remained afebrile, and he was looking stable for discharge home with antibiotics and wound care. However, after wound care changed the dressing 4/21, mian pus was noted from a tunneling lesion, suggesting need for further I&D.  Patient was taken to the operating room by Dr. Israel.  Initial reports from healthcare personnel suggest there was some component of \"necrotizing fat\".  Antibiotics were broadened slightly to include Unasyn.  The case was discussed with infectious disease who will be following closely.    Patient's undergone multiple washout procedures.  Cultures from deep tissue in the operating room have grown Pseudomonas and GAS.  Antibiotics have been extended accordingly and he is receiving Zyvox and Zosyn.    Interval Problem Update  No overnight events. Feels \"good\" this morning and has no concerns. Hemoglobin stable, increased to 7.3 today. Surgery for possible graft planned for Monday. "     Consultants/Specialty  ID  LPS  Ortho    Code Status  full    Disposition  Pending medical and surgical clearance.     Review of Systems  Review of Systems   Constitutional: Negative for chills, fever and malaise/fatigue.   HENT: Negative for congestion and nosebleeds.    Eyes: Negative for blurred vision and photophobia.   Respiratory: Negative for cough and shortness of breath.    Cardiovascular: Negative for chest pain, palpitations and leg swelling.   Gastrointestinal: Negative for abdominal pain, nausea and vomiting.   Genitourinary: Negative for dysuria and frequency.   Musculoskeletal: Positive for myalgias (left LE). Negative for neck pain.   Skin: Positive for itching (mild, along bandage on left leg).   Neurological: Positive for sensory change. Negative for dizziness, tremors, focal weakness and headaches.   Psychiatric/Behavioral: Negative for depression. The patient is not nervous/anxious and does not have insomnia.    All other systems reviewed and are negative.       Physical Exam  Temp:  [36.7 °C (98.1 °F)-37.2 °C (98.9 °F)] 37.2 °C (98.9 °F)  Pulse:  [63-73] 73  Resp:  [16-18] 16  BP: (134-143)/(61-89) 142/61  SpO2:  [94 %-98 %] 98 %    Physical Exam  Vitals signs reviewed.   Constitutional:       General: He is not in acute distress.     Interventions: Face mask in place.   HENT:      Head: Normocephalic.   Eyes:      General: No scleral icterus.     Pupils: Pupils are equal, round, and reactive to light.   Neck:      Musculoskeletal: Normal range of motion. No neck rigidity or muscular tenderness.   Cardiovascular:      Rate and Rhythm: Normal rate and regular rhythm.      Pulses: Normal pulses.   Pulmonary:      Effort: Pulmonary effort is normal. No respiratory distress.   Abdominal:      General: Bowel sounds are normal. There is no distension.      Palpations: Abdomen is soft.      Tenderness: There is no abdominal tenderness.   Musculoskeletal: Normal range of motion.      Right lower  leg: No edema.      Left lower leg: No edema.   Skin:     General: Skin is warm.      Coloration: Skin is pale.      Findings: No bruising.      Comments: Wound vac in place along left lower leg   Neurological:      General: No focal deficit present.      Mental Status: He is alert and oriented to person, place, and time.      Cranial Nerves: No cranial nerve deficit.      Motor: No weakness.   Psychiatric:         Mood and Affect: Mood normal.         Behavior: Behavior normal.     Patient seen and examined today on 5/7, unchanged from 5/6.     Fluids    Intake/Output Summary (Last 24 hours) at 5/7/2020 0717  Last data filed at 5/6/2020 1900  Gross per 24 hour   Intake 927 ml   Output 450 ml   Net 477 ml       Laboratory  Recent Labs     05/05/20 0405 05/06/20  0214 05/07/20  0214   WBC 8.3 6.9 7.4   RBC 2.16* 2.25* 2.29*   HEMOGLOBIN 6.9* 7.0* 7.2*   HEMATOCRIT 20.8* 21.7* 22.2*   MCV 96.3 96.4 96.9   MCH 31.9 31.1 31.4   MCHC 33.2* 32.3* 32.4*   RDW 52.6* 52.5* 52.8*   PLATELETCT 177 175 188   MPV 8.4* 8.7* 8.6*     Recent Labs     05/05/20 0405 05/06/20 0214 05/07/20  0214   SODIUM 132* 135 133*   POTASSIUM 4.4 4.7 4.9   CHLORIDE 100 101 101   CO2 20 23 23   GLUCOSE 101* 116* 102*   BUN 20 19 19   CREATININE 1.33 1.20 1.20   CALCIUM 7.7* 7.8* 8.3*                   Imaging     Assessment/Plan  * Venous stasis ulcer of left lower extremity (HCC)- (present on admission)  Assessment & Plan  With surrounding cellulitis. Arterial US negative for arterial insufficiency.  - s/p washout on 4/22, 4/24, 4/27 and 5/4  - ID and orthopedic surgery following, appreciate recs  - continue with Zyvox and Zosyn, per ID recs  - surgery changed to 5/11   - continue with wound care    Hyperkalemia  Assessment & Plan  K at 4.9  - monitor and correct as needed    Venous stasis dermatitis of both lower extremities- (present on admission)  Assessment & Plan  Chronic venous stasis of bilateral LE.   - management as above    CKD  (chronic kidney disease) stage 3, GFR 30-59 ml/min ([facility])- (present on admission)  Assessment & Plan  Acute on chronic resolved. Now back to baseline.  - avoid nephrotoxic agents  - renally dose medications  - monitor intermittently    Essential hypertension- (present on admission)  Assessment & Plan  Normotensive.  - continue home amlodipine    Constipation  Assessment & Plan  Last BM 5/5  - continue bowel protocol    Normocytic anemia  Assessment & Plan  Likely anemia of chronic disease and in the setting of slow blood loss from his LE wound.   - s/p 1U PRBC on 4/29 and 5/5  - transfuse if hemoglobin < 7  - continue to trend    Methamphetamine abuse ([facility])- (present on admission)  Assessment & Plan  Positive UDS for amphetamine, oxycodone and marijuana.  - continued counseling and education    Alcoholism ([facility])- (present on admission)  Assessment & Plan  - out of withdrawal window  - continued counseling and education  - correct electrolytes as needed    Anxiety- (present on admission)  Assessment & Plan  - continue home sertraline       VTE prophylaxis: No pharmacologic DVT prophylaxis 2/2 anemia requiring transfusions.

## 2020-05-07 NOTE — PROGRESS NOTES
Ortho communication note    Patient scheduled for surgery Saturday morning for left leg I&D and potentially skin grafts if wound bed appropriate.  We will try to see if we can get OR availability Friday afternoon and will know whether that's possible tomorrow morning.    NPO for surgery possibly Friday afternoon, if OR not available then Saturday

## 2020-05-08 ENCOUNTER — APPOINTMENT (OUTPATIENT)
Dept: RADIOLOGY | Facility: MEDICAL CENTER | Age: 65
DRG: 571 | End: 2020-05-08
Attending: HOSPITALIST
Payer: MEDICAID

## 2020-05-08 LAB
ANION GAP SERPL CALC-SCNC: 10 MMOL/L (ref 7–16)
BASOPHILS # BLD AUTO: 0.5 % (ref 0–1.8)
BASOPHILS # BLD: 0.04 K/UL (ref 0–0.12)
BUN SERPL-MCNC: 19 MG/DL (ref 8–22)
CALCIUM SERPL-MCNC: 7.9 MG/DL (ref 8.5–10.5)
CHLORIDE SERPL-SCNC: 104 MMOL/L (ref 96–112)
CO2 SERPL-SCNC: 22 MMOL/L (ref 20–33)
CREAT SERPL-MCNC: 1.07 MG/DL (ref 0.5–1.4)
EOSINOPHIL # BLD AUTO: 0.28 K/UL (ref 0–0.51)
EOSINOPHIL NFR BLD: 3.7 % (ref 0–6.9)
ERYTHROCYTE [DISTWIDTH] IN BLOOD BY AUTOMATED COUNT: 50.9 FL (ref 35.9–50)
GLUCOSE SERPL-MCNC: 94 MG/DL (ref 65–99)
HCT VFR BLD AUTO: 21.2 % (ref 42–52)
HGB BLD-MCNC: 7 G/DL (ref 14–18)
IMM GRANULOCYTES # BLD AUTO: 0.05 K/UL (ref 0–0.11)
IMM GRANULOCYTES NFR BLD AUTO: 0.7 % (ref 0–0.9)
LYMPHOCYTES # BLD AUTO: 1.22 K/UL (ref 1–4.8)
LYMPHOCYTES NFR BLD: 16.3 % (ref 22–41)
MCH RBC QN AUTO: 31.7 PG (ref 27–33)
MCHC RBC AUTO-ENTMCNC: 33 G/DL (ref 33.7–35.3)
MCV RBC AUTO: 95.9 FL (ref 81.4–97.8)
MONOCYTES # BLD AUTO: 0.96 K/UL (ref 0–0.85)
MONOCYTES NFR BLD AUTO: 12.9 % (ref 0–13.4)
NEUTROPHILS # BLD AUTO: 4.92 K/UL (ref 1.82–7.42)
NEUTROPHILS NFR BLD: 65.9 % (ref 44–72)
NRBC # BLD AUTO: 0.03 K/UL
NRBC BLD-RTO: 0.4 /100 WBC
PLATELET # BLD AUTO: 181 K/UL (ref 164–446)
PMV BLD AUTO: 8.8 FL (ref 9–12.9)
POTASSIUM SERPL-SCNC: 4.6 MMOL/L (ref 3.6–5.5)
RBC # BLD AUTO: 2.21 M/UL (ref 4.7–6.1)
SODIUM SERPL-SCNC: 136 MMOL/L (ref 135–145)
WBC # BLD AUTO: 7.5 K/UL (ref 4.8–10.8)

## 2020-05-08 PROCEDURE — 770021 HCHG ROOM/CARE - ISO PRIVATE

## 2020-05-08 PROCEDURE — 700111 HCHG RX REV CODE 636 W/ 250 OVERRIDE (IP): Performed by: FAMILY MEDICINE

## 2020-05-08 PROCEDURE — A9270 NON-COVERED ITEM OR SERVICE: HCPCS | Performed by: FAMILY MEDICINE

## 2020-05-08 PROCEDURE — A9270 NON-COVERED ITEM OR SERVICE: HCPCS | Performed by: NURSE PRACTITIONER

## 2020-05-08 PROCEDURE — 700102 HCHG RX REV CODE 250 W/ 637 OVERRIDE(OP): Performed by: INTERNAL MEDICINE

## 2020-05-08 PROCEDURE — A9270 NON-COVERED ITEM OR SERVICE: HCPCS | Performed by: HOSPITALIST

## 2020-05-08 PROCEDURE — 99232 SBSQ HOSP IP/OBS MODERATE 35: CPT | Performed by: HOSPITALIST

## 2020-05-08 PROCEDURE — 99232 SBSQ HOSP IP/OBS MODERATE 35: CPT | Performed by: NURSE PRACTITIONER

## 2020-05-08 PROCEDURE — 306263 VAC CANNISTER W/GEL 500ML: Performed by: HOSPITALIST

## 2020-05-08 PROCEDURE — 700111 HCHG RX REV CODE 636 W/ 250 OVERRIDE (IP): Performed by: INTERNAL MEDICINE

## 2020-05-08 PROCEDURE — 700105 HCHG RX REV CODE 258: Performed by: INTERNAL MEDICINE

## 2020-05-08 PROCEDURE — A9270 NON-COVERED ITEM OR SERVICE: HCPCS | Performed by: INTERNAL MEDICINE

## 2020-05-08 PROCEDURE — 80048 BASIC METABOLIC PNL TOTAL CA: CPT

## 2020-05-08 PROCEDURE — 85025 COMPLETE CBC W/AUTO DIFF WBC: CPT

## 2020-05-08 PROCEDURE — 36415 COLL VENOUS BLD VENIPUNCTURE: CPT

## 2020-05-08 PROCEDURE — 700102 HCHG RX REV CODE 250 W/ 637 OVERRIDE(OP): Performed by: FAMILY MEDICINE

## 2020-05-08 PROCEDURE — 700102 HCHG RX REV CODE 250 W/ 637 OVERRIDE(OP): Performed by: NURSE PRACTITIONER

## 2020-05-08 PROCEDURE — 700102 HCHG RX REV CODE 250 W/ 637 OVERRIDE(OP): Performed by: HOSPITALIST

## 2020-05-08 PROCEDURE — 97606 NEG PRS WND THER DME>50 SQCM: CPT

## 2020-05-08 RX ADMIN — DIPHENHYDRAMINE HYDROCHLORIDE 25 MG: 25 TABLET ORAL at 22:42

## 2020-05-08 RX ADMIN — DOCUSATE SODIUM 50 MG AND SENNOSIDES 8.6 MG 2 TABLET: 8.6; 5 TABLET, FILM COATED ORAL at 05:35

## 2020-05-08 RX ADMIN — LINEZOLID 600 MG: 600 TABLET, FILM COATED ORAL at 17:54

## 2020-05-08 RX ADMIN — PIPERACILLIN AND TAZOBACTAM 4.5 G: 4; .5 INJECTION, POWDER, LYOPHILIZED, FOR SOLUTION INTRAVENOUS; PARENTERAL at 20:50

## 2020-05-08 RX ADMIN — AMLODIPINE BESYLATE 10 MG: 10 TABLET ORAL at 05:36

## 2020-05-08 RX ADMIN — LINEZOLID 600 MG: 600 TABLET, FILM COATED ORAL at 05:35

## 2020-05-08 RX ADMIN — MORPHINE SULFATE 4 MG: 4 INJECTION INTRAVENOUS at 13:19

## 2020-05-08 RX ADMIN — OXYCODONE 10 MG: 5 TABLET ORAL at 15:07

## 2020-05-08 RX ADMIN — PIPERACILLIN AND TAZOBACTAM 4.5 G: 4; .5 INJECTION, POWDER, LYOPHILIZED, FOR SOLUTION INTRAVENOUS; PARENTERAL at 12:44

## 2020-05-08 RX ADMIN — OXYCODONE 10 MG: 5 TABLET ORAL at 20:50

## 2020-05-08 RX ADMIN — SERTRALINE HYDROCHLORIDE 50 MG: 50 TABLET ORAL at 05:35

## 2020-05-08 RX ADMIN — ATORVASTATIN CALCIUM 40 MG: 40 TABLET, FILM COATED ORAL at 05:50

## 2020-05-08 RX ADMIN — DOCUSATE SODIUM 50 MG AND SENNOSIDES 8.6 MG 2 TABLET: 8.6; 5 TABLET, FILM COATED ORAL at 17:54

## 2020-05-08 RX ADMIN — OXYCODONE 10 MG: 5 TABLET ORAL at 10:55

## 2020-05-08 RX ADMIN — OXYCODONE 10 MG: 5 TABLET ORAL at 05:50

## 2020-05-08 RX ADMIN — PIPERACILLIN AND TAZOBACTAM 4.5 G: 4; .5 INJECTION, POWDER, LYOPHILIZED, FOR SOLUTION INTRAVENOUS; PARENTERAL at 05:34

## 2020-05-08 ASSESSMENT — ENCOUNTER SYMPTOMS
ABDOMINAL PAIN: 0
PALPITATIONS: 0
SHORTNESS OF BREATH: 0
VOMITING: 0
COUGH: 0
DIZZINESS: 0
FALLS: 0
FEVER: 0
FOCAL WEAKNESS: 0
TREMORS: 0
EYE PAIN: 0
INSOMNIA: 0
NAUSEA: 0
BLOOD IN STOOL: 0
NECK PAIN: 0
MYALGIAS: 1
DEPRESSION: 0
SENSORY CHANGE: 1
HEADACHES: 0
NERVOUS/ANXIOUS: 0
CHILLS: 0

## 2020-05-08 NOTE — PROGRESS NOTES
Assumed care at 1900, report received from Verona ROGERS.  Pt A&O x 4, states pain is 05/10. Rest, repositioned, and elevated leg. Medication provided per Mar. Bed locked, 2 rails up, bed in lowest position. Call light in place, belongings at bedside, no needs at this time and hourly rounding in place.

## 2020-05-08 NOTE — CARE PLAN
Problem: Safety  Goal: Will remain free from falls  Outcome: PROGRESSING AS EXPECTED  Note: Pt reminded to call for assistance before attempting ambulation. Bed is in the lowest/locked position, two side rails up. Call light and belongings are within reach. Bed alarm and hourly rounding in place      Problem: Skin Integrity  Goal: Risk for impaired skin integrity will decrease  Outcome: PROGRESSING AS EXPECTED  Note: Wound vac in place.

## 2020-05-08 NOTE — CARE PLAN
Problem: Communication  Goal: The ability to communicate needs accurately and effectively will improve  Outcome: PROGRESSING AS EXPECTED  Intervention: Educate patient and significant other/support system about the plan of care, procedures, treatments, medications and allow for questions  Note: Pt agrees and understands plan of care, all questions answered.     Problem: Pain Management  Goal: Pain level will decrease to patient's comfort goal  Outcome: PROGRESSING AS EXPECTED  Intervention: Follow pain managment plan developed in collaboration with patient and Interdisciplinary Team  Note: Pain expresses pain, and tolerating tx appropriately.

## 2020-05-08 NOTE — PROGRESS NOTES
"[facility] Medicine Daily Progress Note    Date of Service  5/8/2020    Chief Complaint  64 y.o. male admitted 4/16/2020 with Left leg redness and swelling.    Hospital Course    64 y.o. male who presented 4/16/2020 with left leg redness and swelling.  Patient has history of venous stasis dermatitis of both legs, and venous stasis ulcers of the left leg.  Patient states that he was discharged from wound care a month ago and said the ulcers had resolved.  But then a week ago he noted that ulcers came back he also noticed increasing redness and swelling and pain.  He denied having any fever or chills, cough or congestion, shortness of breath, abdominal pain, nausea or vomiting, or diarrhea.  On review of his previous wound cultures, he has history of MRSA, Pseudomonas, Serratia, and group B streptococcus.    Cultures on this admissioon grew MRSA and GAS. ID has been following his care. He was eventually transitioned to oral Zyvox. He was improving. His WBCs normalized, he remained afebrile, and he was looking stable for discharge home with antibiotics and wound care. However, after wound care changed the dressing 4/21, mian pus was noted from a tunneling lesion, suggesting need for further I&D.  Patient was taken to the operating room by Dr. Israel.  Initial reports from healthcare personnel suggest there was some component of \"necrotizing fat\".  Antibiotics were broadened slightly to include Unasyn.  The case was discussed with infectious disease who will be following closely.    Patient's undergone multiple washout procedures.  Cultures from deep tissue in the operating room have grown Pseudomonas and GAS.  Antibiotics have been extended accordingly and he is receiving Zyvox and Zosyn.    Interval Problem Update  Possible graft placement today. No overnight events and reports feeling \"good\". Denies any questions or concerns. Mild to moderate pian of the left leg is stable. "     Consultants/Specialty  ID  LPS  Ortho    Code Status  Full    Disposition  Pending medical and surgical clearance.     Review of Systems  Review of Systems   Constitutional: Negative for chills, fever and malaise/fatigue.   HENT: Negative for congestion and nosebleeds.    Eyes: Negative for pain.   Respiratory: Negative for cough and shortness of breath.    Cardiovascular: Negative for chest pain, palpitations and leg swelling.   Gastrointestinal: Negative for abdominal pain, blood in stool, nausea and vomiting.   Genitourinary: Negative for dysuria and frequency.   Musculoskeletal: Positive for myalgias (left LE). Negative for falls and neck pain.   Skin: Positive for itching (mild, along bandage on left leg).   Neurological: Positive for sensory change. Negative for dizziness, tremors, focal weakness and headaches.   Psychiatric/Behavioral: Negative for depression. The patient is not nervous/anxious and does not have insomnia.    All other systems reviewed and are negative.       Physical Exam  Temp:  [36.7 °C (98 °F)-37.4 °C (99.4 °F)] 37.1 °C (98.7 °F)  Pulse:  [65-75] 73  Resp:  [18-19] 18  BP: (127-161)/(78-87) 161/78  SpO2:  [94 %-97 %] 96 %    Physical Exam  Vitals signs reviewed.   Constitutional:       General: He is not in acute distress.     Appearance: He is not diaphoretic.   HENT:      Head: Normocephalic.      Mouth/Throat:      Mouth: Mucous membranes are moist.   Eyes:      General:         Right eye: No discharge.         Left eye: No discharge.      Extraocular Movements: Extraocular movements intact.   Neck:      Musculoskeletal: Normal range of motion. No neck rigidity or muscular tenderness.   Cardiovascular:      Rate and Rhythm: Normal rate and regular rhythm.      Pulses: Normal pulses.   Pulmonary:      Effort: Pulmonary effort is normal. No respiratory distress.   Abdominal:      General: There is no distension.      Palpations: Abdomen is soft.      Tenderness: There is no abdominal  tenderness.   Musculoskeletal: Normal range of motion.      Right lower leg: No edema.      Left lower leg: No edema.   Skin:     General: Skin is warm.      Coloration: Skin is pale.      Findings: No bruising.      Comments: Wound vac in place along left lower leg   Neurological:      General: No focal deficit present.      Mental Status: He is alert and oriented to person, place, and time.      Cranial Nerves: No cranial nerve deficit.      Motor: No weakness.   Psychiatric:         Mood and Affect: Mood normal.         Behavior: Behavior normal. Behavior is cooperative.        Fluids    Intake/Output Summary (Last 24 hours) at 5/8/2020 0716  Last data filed at 5/8/2020 0709  Gross per 24 hour   Intake 960 ml   Output 1900 ml   Net -940 ml       Laboratory  Recent Labs     05/06/20 0214 05/07/20 0214 05/08/20  0107   WBC 6.9 7.4 7.5   RBC 2.25* 2.29* 2.21*   HEMOGLOBIN 7.0* 7.2* 7.0*   HEMATOCRIT 21.7* 22.2* 21.2*   MCV 96.4 96.9 95.9   MCH 31.1 31.4 31.7   MCHC 32.3* 32.4* 33.0*   RDW 52.5* 52.8* 50.9*   PLATELETCT 175 188 181   MPV 8.7* 8.6* 8.8*     Recent Labs     05/06/20 0214 05/07/20 0214 05/08/20  0107   SODIUM 135 133* 136   POTASSIUM 4.7 4.9 4.6   CHLORIDE 101 101 104   CO2 23 23 22   GLUCOSE 116* 102* 94   BUN 19 19 19   CREATININE 1.20 1.20 1.07   CALCIUM 7.8* 8.3* 7.9*                   Imaging     Assessment/Plan  * Venous stasis ulcer of left lower extremity (HCC)- (present on admission)  Assessment & Plan  With surrounding cellulitis. Arterial US negative for arterial insufficiency.  - s/p washout on 4/22, 4/24, 4/27 and 5/4  - ID and orthopedic surgery following, appreciate recs  - continue with Zyvox and Zosyn, per ID recs  - possible surgery/skin graft today  - continue with wound care    Hyperkalemia  Assessment & Plan  K improved to 4.6 today.   - monitor and correct as needed    Venous stasis dermatitis of both lower extremities- (present on admission)  Assessment & Plan  Chronic venous  stasis of bilateral LE.   - management as above    CKD (chronic kidney disease) stage 3, GFR 30-59 ml/min ([facility])- (present on admission)  Assessment & Plan  Acute on chronic resolved. Now back to baseline.  - avoid nephrotoxic agents; renally dose medications  - monitor intermittently    Essential hypertension- (present on admission)  Assessment & Plan  Intermittently hypertensive.   - continue home amlodipine  - PRN hydralazine    Constipation  Assessment & Plan  Last BM 5/5  - continue bowel protocol    Normocytic anemia  Assessment & Plan  Likely anemia of chronic disease and in the setting of slow blood loss from his LE wound. Hemodynamically stable  - s/p 1U PRBC on 4/29 and 5/5  - transfuse if hemoglobin < 7  - continue to trend    Methamphetamine abuse ([facility])- (present on admission)  Assessment & Plan  Positive UDS for amphetamine, oxycodone and marijuana.  - continued counseling and education    Alcoholism ([facility])- (present on admission)  Assessment & Plan  - out of withdrawal window  - continued counseling and education  - correct electrolytes as needed    Anxiety- (present on admission)  Assessment & Plan  - continue home sertraline       VTE prophylaxis: No pharmacologic DVT prophylaxis 2/2 anemia requiring transfusions.

## 2020-05-08 NOTE — WOUND TEAM
[facility]  Inpatient Services  Wound and Skin Care Follow Up    Admission Date: 4/16/2020     Last order of IP CONSULT TO WOUND CARE was found on 4/24/2020 from Hospital Encounter on 4/16/2020     HPI, PMH, SH: Reviewed    Unit where seen by Wound Team: [id number]     WOUND CONSULT/FOLLOW UP RELATED TO:  LLE Vac change to veraflo    Self Report / Pain Level:  Pre-medicated with IV medication. Minimal pain noted.       OBJECTIVE:  Tubi  in place, vac dressings in place and sponge compressed.    WOUND TYPE, LOCATION, CHARACTERISTICS (Pressure Injuries: location, stage, POA or date identified)     Negative Pressure Wound Therapy 05/05/20 Surgical (Active)   NPWT Pump Mode / Pressure Setting Intermittent;125 mmHg 5/8/2020     Dressing Type Black Foam (Veraflo);Medium;Large;Black Foam (Regular)    Number of Foam Pieces Used     Canister Changed No    Output (mL) 400 mL    NEXT Dressing Change/Treatment Date 05/11/20    VAC VeraFlo Irrigant Normal Saline    VAC VeraFlo Soak Time (mins) 4    VAC VeraFlo Instill Volume (ml) 20    VAC VeraFlo - Therapy Time (hrs) 1    VAC VeraFlo Pressure (mm/Hg) Intermittent;125 mmHg       Wound 04/16/20 Venous Ulcer Ankle;Leg Medial;Anterior;Posterior Left -Left Medial/Posterior Ankle & lower leg (Active)   Wound Image   5/8/2020     Site Assessment Red;Early/partial granulation 5/8/2020    Periwound Assessment Intact    Margins Attached edges;Defined edges    Closure Secondary intention    Drainage Amount Scant    Drainage Description Sanguineous    Treatments Cleansed;Site care    Wound Cleansing Approved Wound Cleanser    Periwound Protectant Benzoin;Drape;Paste Ring    Dressing Cleansing/Solutions Not Applicable    Dressing Options Wound Vac;Compression Wrap Two Layer    Dressing Changed Changed    Dressing Status Clean;Dry;Intact    Dressing Change/Treatment Frequency Monday, Wednesday, Friday, and As Needed    NEXT Dressing Change/Treatment Date 05/11/20    NEXT  Weekly Photo (Inpatient Only) 05/15/20    Non-staged Wound Description Full thickness    Wound Length (cm) 17 cm    Wound Width (cm) 10 cm    Wound Depth (cm) 0.2 cm    Wound Surface Area (cm^2) 170 cm^2    Wound Volume (cm^3) 34 cm^3    Wound Healing % 60    Shape Irregular    Wound Odor None    Pulses Left;1+;DP    Exposed Structures None       Wound 05/05/20 Full Thickness Wound Leg Lateral;Lower Left open incision (Active)   Wound Image   5/8/2020     Site Assessment Bleeding;Red;Early/partial granulation 5/8/2020   Periwound Assessment Intact    Margins Defined edges;Unattached edges    Closure Secondary intention    Drainage Amount Scant    Drainage Description Sanguineous    Treatments Cleansed;Site care    Wound Cleansing Approved Wound Cleanser    Periwound Protectant Benzoin;Drape;Paste Ring    Dressing Cleansing/Solutions Normal Saline    Dressing Options Wound Vac    Dressing Changed Changed    Dressing Status Clean;Dry;Intact    Dressing Change/Treatment Frequency Monday, Wednesday, Friday, and As Needed    NEXT Dressing Change/Treatment Date 05/11/20    NEXT Weekly Photo (Inpatient Only) 05/15/20    Non-staged Wound Description Full thickness    Wound Length (cm) 23 cm    Wound Width (cm) 15 cm    Wound Depth (cm) 0.6 cm    Wound Surface Area (cm^2) 345 cm^2    Wound Volume (cm^3) 207 cm^3    Tunneling (cm) 2 cm    Tunneling Clock Position of Wound 11    Wound Odor None    Pulses Left;1+;DP    Exposed Structures Muscle;Tendon      Vascular:    BHUPINDER:   No results found.      Lab Values:    Lab Results   Component Value Date/Time    WBC 7.5 05/08/2020 01:07 AM    RBC 2.21 (L) 05/08/2020 01:07 AM    HEMOGLOBIN 7.0 (L) 05/08/2020 01:07 AM    HEMATOCRIT 21.2 (L) 05/08/2020 01:07 AM    CREACTPROT 14.44 (H) 04/22/2020 12:49 AM    SEDRATEWES 103 (H) 05/07/2020 11:12 AM    HBA1C 5.7 (H) 01/23/2020 11:22 AM        Culture:   Obtained,   Culture Results show:  Recent Results (from the past 720 hour(s))   CULTURE  WOUND W/ GRAM STAIN    Collection Time: 04/22/20 12:03 PM   Result Value Ref Range    Significant Indicator POS (POS)     Source WND     Site Left Leg     Culture Result - (A)     Gram Stain Result Moderate WBCs.  No organisms seen.       Culture Result (A)      Pseudomonas aeruginosa  Light growth  P.aeruginosa may develop resistance during prolonged therapy  with all antibiotics. Isolates that are initially susceptible  may become resistant within three to four days after  initiation of therapy. Testing of repeat isolates may be  warranted.      Culture Result (A)      Beta Hemolytic Streptococcus group A  Rare growth         Susceptibility    Pseudomonas aeruginosa - SHANON     Ceftazidime <=1 Sensitive mcg/mL     Ciprofloxacin >2 Resistant mcg/mL     Cefepime 4 Sensitive mcg/mL     Amikacin <=16 Sensitive mcg/mL     Gentamicin <=4 Sensitive mcg/mL     Tobramycin <=4 Sensitive mcg/mL     Meropenem <=1 Sensitive mcg/mL     Pip/Tazobactam 64 Sensitive mcg/mL         INTERVENTIONS BY WOUND TEAM:  Dressing removed from lateral wound cleansed with wound cleanser. No active bleeding, no fluctuance noted. Perico APRN for LPS in to see wound. Marylou wound prepped with benzoin and paste ring. Veraflo sponge applied to wound bed and secured with drape, trac pad applied. Veraflo therapy initiated, instilling 20ml of NS soaking for 4 minutes every 1 hours. Medial wound vac removed and cleansed with wound cleanser. Paste ring to marylou wound, regular vac sponge to wound bed. Secured with drape. Trac pad applied to lateral side and seal obtained and therapy resumed at 125mmHg Y connected. L leg then wrapped in 2 layer compression.     Interdisciplinary consultation: Patient, Bedside RN, LPS APRN Perico    EVALUATION: Patient admitted with LLE redness and swelling, venous ulcers present, developed an abcess and gas trapped underneath surface, multiple I&D's and Necrotic tissue developed. Had to be excised on the lateral leg down to the  fascia. LLE now presents with Lateral surgical incision wound and venous ulcers to the anterior leg/medial/posterior ankle. Need new pictures and measurements of venous ulcers on next dressing change. Due to large amounts of bleeding at the proximal site of lateral wound, no veraflo applied at this time. Venous ulcers will benefit from NPWT to manage drainage, assist in debridement, and properly heal the wound beds. Will continue regular NPWT on venous ulcer sites. Lateral incisional wound veraflow NPWT to maintain a moist wound bed, manage drainage, encourage granulation, and keep the wound clean.     Goals: Steady decrease in wound area and depth weekly.    NURSING PLAN OF CARE ORDERS (X):    Dressing changes: See Dressing Care orders: X  Skin care: See Skin Care orders: X  Rectal tube care: See Rectal Tube Care orders:   Other orders:    WOUND TEAM PLAN OF CARE:   Dressing changes by wound team:          Follow up 1-2 times weekly:               Follow up 3 times weekly:                NPWT change 3 times weekly:   X  Follow up as needed:       Other (explain):     Anticipated discharge plans:   LTACH:    X    SNF/Rehab:   X               Home Care:           Outpatient Wound Center:            Self Care:

## 2020-05-08 NOTE — PROGRESS NOTES
Assumed care at 1900, report received from Stacie ROGERS.  Pt A&O x 4, states pain is 07/10. Bed locked, 2 rails up, bed in lowest position. Call light in place, belongings at bedside, no needs at this time and hourly rounding in place.

## 2020-05-08 NOTE — PROGRESS NOTES
Assumed care at 0700, report received from night shift RN.  Pt is laying in bed, eyes closed with no obvious s/sx of distress. Bed is locked and in the lowest position. Call light and belongings within reach. Whiteboard updated with RN and CNA phone numbers.

## 2020-05-08 NOTE — PROGRESS NOTES
LIMB PRESERVATION SERVICE   POST SURGICAL PROGRESS NOTE      HPI:  64 y.o.  with a past medical history that includes hypertension, hepatitis C, kidney disease, polysubstance abuse, nondiabetic Charcot's deformity left foot, and chronic venous stasis ulcers bilateral lower extremities admitted 4/16/2020 for Cellulitis of left lower extremity.        LPS has been consulted for evaluation of left lower leg venous stasis ulcers.  The patient has a history of chronic venous stasis ulcers and has been seen in outpatient wound clinic for treatment as well as infectious disease in October 2019 for an infected skin ulcer.  He reports that his previous venostasis ulcers had resolved approximately 1 month ago and he was discharged from the outpatient wound clinic.  He states that 1 week ago (4/12/2020) he was in the shower and noticed that he developed an ulcer on the anterior aspect of his lower leg distal to his knee and then the ulcer had continued to worsen from there radiating to the back of his left lower leg and development of new ulcers down his left lateral lower leg to his ankle.  He reports having fever, chills, and a large amount of clear drainage from his ulcers.  He states that 4/15/2020 his left lower leg began to swell and developed redness.  He was seen in the outpatient wound center where he was found to have significant swelling and erythema to his left lower extremity, weeping from superficial tissue, and foul-smelling odor.    Patient proceeded to ED.  ESR 97, CRP 31.7.  He was started on vancomycin and Zosyn IV and admitted under hospital services.     SURGERY DATE: 04/22/20  PROCEDURE: with Dr. Israel  1.  Left leg irrigation and debridement, multiple compartments, deep down to   the level of muscle.  2.  Left fasciotomy anterior and posterior compartments.  3.  Left placement of wound VAC.    SURGERY DATE: 04/24/20  PROCEDURE: with Dr. Israel  1.  Left leg irrigation and debridement, multiple  compartments.  2.  Placement of wound VAC.    SURGERY DATE: 04/27/20  PROCEDURE: with Dr. John  1.  Left leg irrigation and debridement, multiple compartments.  2.  Placement of wound VAC.    SURGERY DATE: 05/4/2020 with Dr. Israel and Dr. John  PROCEDURE: Left leg irrigation and debridement.         4/22/2020: Patient denies fevers, chills, nausea, vomiting.  Complains of pain with inspection of lower leg ulcers.  Tract with purulent drainage noted yesterday by wound team.  Repeat CT ordered and was negative for abscess or osteomyelitis.  Patient has been n.p.o. since last night.  Dressings that were changed yesterday are completely saturated today.   Patient also has Charcot foot to left foot and complains of severe pain to foot when walking.  4/28/2020: Patient denies fevers, chills, nausea, vomiting.  Pain well controlled.   LPS was called by bedside RN to assess left LE.  Wound VAC placed last night, clotted off when patient arrived to floor, troubleshoot by night RN, unable to resolve.  NOC RN removed drape (not the foam) and the wound started bleeding.  NOC held pressure to wound bed, and applied pressure dressing.    4/29/2020: Patient's Left LE dressing intact, removed pressure dressing with wound care RN, Oriana Barcenas.  Still open bleeding with light trickle of, used AgNO3 to help stop small bleeds, larger bleeds pressure dressing replaced by wound RNOriana.   5/01/2020: Patient denies fever, chills, n/v.  No complaints of pain.  Dressing removed and wounds assessed.  Wound VAC was placed on Left LE Anterior/Lateral wound.  2 layer compression wrap placed to left LE.  Plan for patient to go to surgery on Monday.   5/5/2020: POD #1 S/P left leg I&D.  Denies any fevers, chills, nausea, vomiting.  Seen with wound team during VAC placement to left lower leg.  5/6/2020: POD #2 S/P left leg I&D.  N.p.o. for possible surgery.  Surgery canceled.  Reschedule for next week.  Wound team to apply veraflo to left  lateral calf ulcer  5/8/2020: POD #4 S/P left leg I&D.  N.p.o. for possible surgery today.  LLE ulcers seen with wound team.  Tendon visible, viable, clean to lateral calf.  Tract proximally has decreased.  Plan for split-thickness skin graft next week.  Continue with veraflo to lateral calf and regular VAC to posterior medial lower leg.                PERTINENT LPS RESULTS:   No new pertinent results    SURGICAL SITE EXAM:      /93   Pulse 74   Temp 37.2 °C (99 °F) (Temporal)   Resp 18   Ht 1.829 m (6')   Wt 81.3 kg (179 lb 3.7 oz)   SpO2 94%   BMI 24.31 kg/m²     Pedal Pulses: palpable pulses  Sensation: LOPS  Foot warm to touch    Left lateral calf:   Red granular tissue-improvement from my last assessment except to proximal aspect where Surgifoam was in place  Tendon visible, viable, healthy   Tract at 11:00 has decreased from 6 cm to 2.3 cm tissue,   Previous Surgifoam removed to tract and proximal aspect of wound.  No bleeding encountered.    No odor  No erythema  edema has decreased      Left posterior medial ankle:  Small incision with sutures approximated.  Slightly macerated   Incision surrounded by full-thickness wound with 100% red granular tissue.  No odor  Edema has decreased  No erythema    Left anterior lower leg ulcer:  Full-thickness 100% red granular tissue  No odor  Edema has decreased  No erythema        Wound care completed by Oriana Barcenas RN with wound team.  See note and flowsheet for further detail.      DIABETES MANAGEMENT:    Blood glucose:   Results from last 7 days   Lab Units 05/04/20 2039   ACCU CHECK GLUCOSE 788 mg/dL 90     A1c:   Lab Results   Component Value Date/Time    HBA1C 5.7 (H) 01/23/2020 11:22 AM            INFECTION MANAGEMENT:    Results from last 7 days   Lab Units 05/08/20  0107 05/07/20  0214 05/06/20  0214 05/05/20  0405 05/04/20  0331 05/03/20  0045   WBC 1501 K/uL 7.5 7.4 6.9 8.3 8.2 8.0   PLATELET COUNT 1518 K/uL 181 188 175 177 213 220     Wound  culture results:   Results     Procedure Component Value Units Date/Time    SARS-CoV-2, PCR (In-House) [[id number]] Collected:  05/04/20 0948    Order Status:  Completed Updated:  05/04/20 1139     SARS-CoV-2 Source NP Swab     SARS-CoV-2 by PCR NotDetected     Comment: [facility] providers: PLEASE REFER TO DE-ESCALATION AND RETESTING PROTOCOL  on [email or web address]  **The [employer]Volume GeneXpert Xpress SARS-CoV-2 Test has been made available for  use under the Emergency Use Authorization (EUA) only.         Narrative:       Contact  Rule-out COVID-19 panel, includes droplet/contact/eye  isolation order.  Check influenza to order this test only if  specifically indicated.  Please run rapid test, pt is pre op for today.    COVID/SARS CoV-2 [[id number]] Collected:  05/04/20 0948    Order Status:  Completed Specimen:  Respirate from Nasopharyngeal Updated:  05/04/20 1001     COVID Order Status Received    Narrative:       Contact  Rule-out COVID-19 panel, includes droplet/contact/eye  isolation order.  Check influenza to order this test only if  specifically indicated.  Please run rapid test, pt is pre op for today.               ASSESSMENT/PLAN:   Patient POD # 4 S/P left lower leg I&D.  Patient has had 4 I&D's to this area thus far.   Tract to proximal lateral calf has decreased.  No further bleeding noted.  Increased granular tissue noted to all wound beds to left leg.  Tendon to lateral calf visible, viable and healthy.  Continue with VERAFLO wound VAC to lateral and anterior calf and regular VAC to posterior medial lower leg until next week, reevaluate for possible split-thickness skin graft      Cancel surgery today  Regular diet      Wound care:   - wound team to manage wound care.   -regular VAC to left posterior medial lower leg ulcers   -VERAFLO to left lateral calf and anterior proximal leg,   -2 layer compression wrap to LLE to control edema  -veraflo VAC change and 2 layer compression wrap 3x/wk     Vascular status:   -  Palpable pulses    Antibiotics:   - ID following  -continue on zosyn and linezolid    Weight Bearing Status:   -Weight bearing as tolerated    Offloading:   -offload with pillows    PT/OT :   -involved,       Plan to return to O.R.:    return to surgery for STSG on Wed 5/13/2020 with Dr. Israel          DISCHARGE PLAN:    Disposition: recommend SNF or Rehab     Follow-up: pending      Discussed with: pt, RN, Dr. John, Dr. Israel, Oriana Barcenas RN wound team,         LAURA [email or web address]    If any questions or concerns, please call [phone]

## 2020-05-08 NOTE — CARE PLAN
Problem: Communication  Goal: The ability to communicate needs accurately and effectively will improve  Outcome: PROGRESSING AS EXPECTED  Note: Pt able to communicate needs effectively, uses call light appropriately     Problem: Pain Management  Goal: Pain level will decrease to patient's comfort goal  Outcome: PROGRESSING AS EXPECTED  Note: Pt reports relief of pain with PRN pain medications.

## 2020-05-09 LAB
BASOPHILS # BLD AUTO: 0.6 % (ref 0–1.8)
BASOPHILS # BLD: 0.04 K/UL (ref 0–0.12)
EOSINOPHIL # BLD AUTO: 0.31 K/UL (ref 0–0.51)
EOSINOPHIL NFR BLD: 4.3 % (ref 0–6.9)
ERYTHROCYTE [DISTWIDTH] IN BLOOD BY AUTOMATED COUNT: 51.7 FL (ref 35.9–50)
HCT VFR BLD AUTO: 23 % (ref 42–52)
HGB BLD-MCNC: 7.6 G/DL (ref 14–18)
IMM GRANULOCYTES # BLD AUTO: 0.06 K/UL (ref 0–0.11)
IMM GRANULOCYTES NFR BLD AUTO: 0.8 % (ref 0–0.9)
LYMPHOCYTES # BLD AUTO: 1.28 K/UL (ref 1–4.8)
LYMPHOCYTES NFR BLD: 17.9 % (ref 22–41)
MCH RBC QN AUTO: 31.9 PG (ref 27–33)
MCHC RBC AUTO-ENTMCNC: 33 G/DL (ref 33.7–35.3)
MCV RBC AUTO: 96.6 FL (ref 81.4–97.8)
MONOCYTES # BLD AUTO: 0.94 K/UL (ref 0–0.85)
MONOCYTES NFR BLD AUTO: 13.1 % (ref 0–13.4)
NEUTROPHILS # BLD AUTO: 4.52 K/UL (ref 1.82–7.42)
NEUTROPHILS NFR BLD: 63.3 % (ref 44–72)
NRBC # BLD AUTO: 0 K/UL
NRBC BLD-RTO: 0 /100 WBC
PLATELET # BLD AUTO: 215 K/UL (ref 164–446)
PMV BLD AUTO: 8.9 FL (ref 9–12.9)
RBC # BLD AUTO: 2.38 M/UL (ref 4.7–6.1)
WBC # BLD AUTO: 7.2 K/UL (ref 4.8–10.8)

## 2020-05-09 PROCEDURE — 36415 COLL VENOUS BLD VENIPUNCTURE: CPT

## 2020-05-09 PROCEDURE — 306263 VAC CANNISTER W/GEL 500ML: Performed by: HOSPITALIST

## 2020-05-09 PROCEDURE — 85025 COMPLETE CBC W/AUTO DIFF WBC: CPT

## 2020-05-09 PROCEDURE — A9270 NON-COVERED ITEM OR SERVICE: HCPCS | Performed by: FAMILY MEDICINE

## 2020-05-09 PROCEDURE — 99232 SBSQ HOSP IP/OBS MODERATE 35: CPT | Performed by: HOSPITALIST

## 2020-05-09 PROCEDURE — 700102 HCHG RX REV CODE 250 W/ 637 OVERRIDE(OP): Performed by: FAMILY MEDICINE

## 2020-05-09 PROCEDURE — A9270 NON-COVERED ITEM OR SERVICE: HCPCS | Performed by: INTERNAL MEDICINE

## 2020-05-09 PROCEDURE — 700105 HCHG RX REV CODE 258: Performed by: INTERNAL MEDICINE

## 2020-05-09 PROCEDURE — A9270 NON-COVERED ITEM OR SERVICE: HCPCS | Performed by: HOSPITALIST

## 2020-05-09 PROCEDURE — 770021 HCHG ROOM/CARE - ISO PRIVATE

## 2020-05-09 PROCEDURE — 700102 HCHG RX REV CODE 250 W/ 637 OVERRIDE(OP): Performed by: INTERNAL MEDICINE

## 2020-05-09 PROCEDURE — 700102 HCHG RX REV CODE 250 W/ 637 OVERRIDE(OP): Performed by: HOSPITALIST

## 2020-05-09 PROCEDURE — A9270 NON-COVERED ITEM OR SERVICE: HCPCS | Performed by: NURSE PRACTITIONER

## 2020-05-09 PROCEDURE — 700111 HCHG RX REV CODE 636 W/ 250 OVERRIDE (IP): Performed by: INTERNAL MEDICINE

## 2020-05-09 PROCEDURE — 700102 HCHG RX REV CODE 250 W/ 637 OVERRIDE(OP): Performed by: NURSE PRACTITIONER

## 2020-05-09 PROCEDURE — 307736 CANISTER GO RENASYS 300ML: Performed by: HOSPITALIST

## 2020-05-09 RX ADMIN — PIPERACILLIN AND TAZOBACTAM 4.5 G: 4; .5 INJECTION, POWDER, LYOPHILIZED, FOR SOLUTION INTRAVENOUS; PARENTERAL at 22:00

## 2020-05-09 RX ADMIN — OXYCODONE 10 MG: 5 TABLET ORAL at 13:14

## 2020-05-09 RX ADMIN — PIPERACILLIN AND TAZOBACTAM 4.5 G: 4; .5 INJECTION, POWDER, LYOPHILIZED, FOR SOLUTION INTRAVENOUS; PARENTERAL at 05:09

## 2020-05-09 RX ADMIN — OXYCODONE 10 MG: 5 TABLET ORAL at 07:42

## 2020-05-09 RX ADMIN — ACETAMINOPHEN 650 MG: 325 TABLET, FILM COATED ORAL at 07:42

## 2020-05-09 RX ADMIN — DOCUSATE SODIUM 50 MG AND SENNOSIDES 8.6 MG 2 TABLET: 8.6; 5 TABLET, FILM COATED ORAL at 17:29

## 2020-05-09 RX ADMIN — DOCUSATE SODIUM 50 MG AND SENNOSIDES 8.6 MG 2 TABLET: 8.6; 5 TABLET, FILM COATED ORAL at 05:09

## 2020-05-09 RX ADMIN — ATORVASTATIN CALCIUM 40 MG: 40 TABLET, FILM COATED ORAL at 05:10

## 2020-05-09 RX ADMIN — FERROUS GLUCONATE 324 MG: 324 TABLET ORAL at 07:42

## 2020-05-09 RX ADMIN — OXYCODONE 10 MG: 5 TABLET ORAL at 17:29

## 2020-05-09 RX ADMIN — LINEZOLID 600 MG: 600 TABLET, FILM COATED ORAL at 05:09

## 2020-05-09 RX ADMIN — OXYCODONE 10 MG: 5 TABLET ORAL at 22:11

## 2020-05-09 RX ADMIN — ACETAMINOPHEN 650 MG: 325 TABLET, FILM COATED ORAL at 17:29

## 2020-05-09 RX ADMIN — LINEZOLID 600 MG: 600 TABLET, FILM COATED ORAL at 17:29

## 2020-05-09 RX ADMIN — OXYCODONE 10 MG: 5 TABLET ORAL at 02:57

## 2020-05-09 RX ADMIN — PIPERACILLIN AND TAZOBACTAM 4.5 G: 4; .5 INJECTION, POWDER, LYOPHILIZED, FOR SOLUTION INTRAVENOUS; PARENTERAL at 13:14

## 2020-05-09 RX ADMIN — SERTRALINE HYDROCHLORIDE 50 MG: 50 TABLET ORAL at 05:09

## 2020-05-09 RX ADMIN — AMLODIPINE BESYLATE 10 MG: 10 TABLET ORAL at 05:09

## 2020-05-09 ASSESSMENT — ENCOUNTER SYMPTOMS
NERVOUS/ANXIOUS: 0
NAUSEA: 0
NECK PAIN: 0
SHORTNESS OF BREATH: 0
HEADACHES: 0
FEVER: 0
FOCAL WEAKNESS: 0
TREMORS: 0
MYALGIAS: 1
BLOOD IN STOOL: 0
VOMITING: 0
PALPITATIONS: 0
INSOMNIA: 0
DEPRESSION: 0
FLANK PAIN: 0
EYE PAIN: 0
FALLS: 0
CHILLS: 0
EYE REDNESS: 0
DIZZINESS: 0
SENSORY CHANGE: 1
COUGH: 0
ABDOMINAL PAIN: 0

## 2020-05-09 ASSESSMENT — FIBROSIS 4 INDEX: FIB4 SCORE: 1.73

## 2020-05-09 NOTE — CARE PLAN
Problem: Communication  Goal: The ability to communicate needs accurately and effectively will improve  Outcome: PROGRESSING AS EXPECTED  Note: Pt able to communicate needs effectively, utilized call light appropriately.     Problem: Pain Management  Goal: Pain level will decrease to patient's comfort goal  Outcome: PROGRESSING AS EXPECTED  Note: Pain frequently assessed throughout shift. Pt reports pain in LLE, PRN medication and other non pharmacological interventions utilized with relief.

## 2020-05-09 NOTE — PROGRESS NOTES
"[facility] Medicine Daily Progress Note    Date of Service  5/9/2020    Chief Complaint  64 y.o. male admitted 4/16/2020 with Left leg redness and swelling.    Hospital Course    64 y.o. male who presented 4/16/2020 with left leg redness and swelling.  Patient has history of venous stasis dermatitis of both legs, and venous stasis ulcers of the left leg.  Patient states that he was discharged from wound care a month ago and said the ulcers had resolved.  But then a week ago he noted that ulcers came back he also noticed increasing redness and swelling and pain.  He denied having any fever or chills, cough or congestion, shortness of breath, abdominal pain, nausea or vomiting, or diarrhea.  On review of his previous wound cultures, he has history of MRSA, Pseudomonas, Serratia, and group B streptococcus.    Cultures on this admissioon grew MRSA and GAS. ID has been following his care. He was eventually transitioned to oral Zyvox. He was improving. His WBCs normalized, he remained afebrile, and he was looking stable for discharge home with antibiotics and wound care. However, after wound care changed the dressing 4/21, mian pus was noted from a tunneling lesion, suggesting need for further I&D.  Patient was taken to the operating room by Dr. Israel.  Initial reports from healthcare personnel suggest there was some component of \"necrotizing fat\".  Antibiotics were broadened slightly to include Unasyn.  The case was discussed with infectious disease who will be following closely.    Patient's undergone multiple washout procedures.  Cultures from deep tissue in the operating room have grown Pseudomonas and GAS.  Antibiotics have been extended accordingly and he is receiving Zyvox and Zosyn.    Interval Problem Update  Wound vac changed yesterday, plan for skin graft on 5/13. No overnight events. Denies any concerns or questions.     Consultants/Specialty  ID  LPS  Ortho    Code Status  Full    Disposition  Pending " medical and surgical clearance.     Review of Systems  Review of Systems   Constitutional: Negative for chills, fever and malaise/fatigue.   HENT: Negative for congestion and nosebleeds.    Eyes: Negative for pain and redness.   Respiratory: Negative for cough and shortness of breath.    Cardiovascular: Negative for chest pain, palpitations and leg swelling.   Gastrointestinal: Negative for abdominal pain, blood in stool, nausea and vomiting.   Genitourinary: Negative for flank pain.   Musculoskeletal: Positive for myalgias (left LE). Negative for falls and neck pain.   Skin: Positive for itching (mild, along bandage on left leg).   Neurological: Positive for sensory change. Negative for dizziness, tremors, focal weakness and headaches.   Psychiatric/Behavioral: Negative for depression. The patient is not nervous/anxious and does not have insomnia.    All other systems reviewed and are negative.       Physical Exam  Temp:  [36.7 °C (98 °F)-37.2 °C (99 °F)] 36.7 °C (98 °F)  Pulse:  [68-80] 68  Resp:  [18] 18  BP: (129-154)/(72-93) 145/84  SpO2:  [92 %-96 %] 92 %    Physical Exam  Vitals signs reviewed.   Constitutional:       General: He is not in acute distress.     Appearance: He is not diaphoretic.   HENT:      Head: Normocephalic.      Mouth/Throat:      Mouth: Mucous membranes are moist.   Eyes:      General:         Right eye: No discharge.         Left eye: No discharge.      Extraocular Movements: Extraocular movements intact.   Neck:      Musculoskeletal: Normal range of motion. No neck rigidity or muscular tenderness.   Cardiovascular:      Rate and Rhythm: Normal rate and regular rhythm.      Pulses: Normal pulses.   Pulmonary:      Effort: Pulmonary effort is normal. No respiratory distress.   Abdominal:      General: There is no distension.      Palpations: Abdomen is soft.      Tenderness: There is no abdominal tenderness.   Musculoskeletal: Normal range of motion.      Right lower leg: No edema.       Left lower leg: No edema.   Skin:     General: Skin is warm.      Coloration: Skin is pale.      Findings: No bruising.      Comments: Wound vac in place along left lower leg   Neurological:      General: No focal deficit present.      Mental Status: He is alert and oriented to person, place, and time.      Cranial Nerves: No cranial nerve deficit.      Motor: No weakness.   Psychiatric:         Mood and Affect: Mood normal.         Behavior: Behavior normal. Behavior is cooperative.      Patient seen and examined today on 5/9, unchanged from 5/8.     Fluids    Intake/Output Summary (Last 24 hours) at 5/9/2020 0727  Last data filed at 5/9/2020 0455  Gross per 24 hour   Intake 280 ml   Output 1075 ml   Net -795 ml       Laboratory  Recent Labs     05/07/20  0214 05/08/20  0107 05/09/20  0308   WBC 7.4 7.5 7.2   RBC 2.29* 2.21* 2.38*   HEMOGLOBIN 7.2* 7.0* 7.6*   HEMATOCRIT 22.2* 21.2* 23.0*   MCV 96.9 95.9 96.6   MCH 31.4 31.7 31.9   MCHC 32.4* 33.0* 33.0*   RDW 52.8* 50.9* 51.7*   PLATELETCT 188 181 215   MPV 8.6* 8.8* 8.9*     Recent Labs     05/07/20  0214 05/08/20  0107   SODIUM 133* 136   POTASSIUM 4.9 4.6   CHLORIDE 101 104   CO2 23 22   GLUCOSE 102* 94   BUN 19 19   CREATININE 1.20 1.07   CALCIUM 8.3* 7.9*                   Imaging     Assessment/Plan  * Venous stasis ulcer of left lower extremity (HCC)- (present on admission)  Assessment & Plan  With surrounding cellulitis. Arterial US negative for arterial insufficiency.  - s/p washout on 4/22, 4/24, 4/27 and 5/4  - ID and orthopedic surgery following, appreciate recs  - continue with Zyvox and Zosyn, per ID recs  - possible surgery/skin graft 5/13  - continue with wound care    Hyperkalemia  Assessment & Plan  K improved to 4.6.  - monitor and correct as needed    Venous stasis dermatitis of both lower extremities- (present on admission)  Assessment & Plan  Chronic venous stasis of bilateral LE.   - management as above    CKD (chronic kidney disease) stage  3, GFR 30-59 ml/min ([facility])- (present on admission)  Assessment & Plan  Acute on chronic resolved. Cr down to 1.07  - avoid nephrotoxic agents; renally dose medications  - monitor intermittently    Essential hypertension- (present on admission)  Assessment & Plan  Intermittently hypertensive.   - continue home amlodipine  - PRN hydralazine    Constipation  Assessment & Plan  Last BM 5/7  - continue bowel protocol    Normocytic anemia- (present on admission)  Assessment & Plan  Likely anemia of chronic disease and in the setting of slow blood loss from his LE wound. Hemodynamically stable. Hemoglobin up to 7.6 today  - s/p 1U PRBC on 4/29 and 5/5  - transfuse if hemoglobin < 7  - continue to trend    Methamphetamine abuse ([facility])- (present on admission)  Assessment & Plan  Positive UDS for amphetamine, oxycodone and marijuana.  - continued counseling and education    Alcoholism ([facility])- (present on admission)  Assessment & Plan  - out of withdrawal window  - continued counseling and education  - correct electrolytes as needed    Anxiety- (present on admission)  Assessment & Plan  - continue home sertraline       VTE prophylaxis: No pharmacologic DVT prophylaxis 2/2 anemia requiring transfusions.

## 2020-05-09 NOTE — CARE PLAN
Problem: Knowledge Deficit  Goal: Knowledge of disease process/condition, treatment plan, diagnostic tests, and medications will improve  Outcome: PROGRESSING AS EXPECTED  Note: Patient aware of treatment plan and process of hospital stay, expresses needs accurately     Problem: Pain Management  Goal: Pain level will decrease to patient's comfort goal  Outcome: PROGRESSING AS EXPECTED  Note: Pt describes pain accurately, pain meds given as prescribed.

## 2020-05-09 NOTE — PROGRESS NOTES
Assumed care of patient at 1900. Pt A&Ox4, resting comfortably in bed, bed in low and locked position, call bell in reach. Pt denies any pain or needs at this time.

## 2020-05-09 NOTE — PROGRESS NOTES
Assumed cares at 0700. Pt resting comfortably in bed during bedside shift report, no signs of distress. PIV infusing abx as ordered. Pt on RA. Wound vac running at 125 mmHg, c/d/i. Bed in lowest locked position, call light in reach. Isolation precautions continued. Safety precautions maintained.

## 2020-05-10 PROBLEM — F10.10 ALCOHOL ABUSE: Status: ACTIVE | Noted: 2018-07-11

## 2020-05-10 LAB
BASOPHILS # BLD AUTO: 0.5 % (ref 0–1.8)
BASOPHILS # BLD: 0.03 K/UL (ref 0–0.12)
EOSINOPHIL # BLD AUTO: 0.39 K/UL (ref 0–0.51)
EOSINOPHIL NFR BLD: 6.2 % (ref 0–6.9)
ERYTHROCYTE [DISTWIDTH] IN BLOOD BY AUTOMATED COUNT: 50.9 FL (ref 35.9–50)
HCT VFR BLD AUTO: 22.9 % (ref 42–52)
HGB BLD-MCNC: 7.3 G/DL (ref 14–18)
IMM GRANULOCYTES # BLD AUTO: 0.04 K/UL (ref 0–0.11)
IMM GRANULOCYTES NFR BLD AUTO: 0.6 % (ref 0–0.9)
LYMPHOCYTES # BLD AUTO: 1.16 K/UL (ref 1–4.8)
LYMPHOCYTES NFR BLD: 18.5 % (ref 22–41)
MCH RBC QN AUTO: 30.9 PG (ref 27–33)
MCHC RBC AUTO-ENTMCNC: 31.9 G/DL (ref 33.7–35.3)
MCV RBC AUTO: 97 FL (ref 81.4–97.8)
MONOCYTES # BLD AUTO: 0.68 K/UL (ref 0–0.85)
MONOCYTES NFR BLD AUTO: 10.8 % (ref 0–13.4)
NEUTROPHILS # BLD AUTO: 3.97 K/UL (ref 1.82–7.42)
NEUTROPHILS NFR BLD: 63.4 % (ref 44–72)
NRBC # BLD AUTO: 0.02 K/UL
NRBC BLD-RTO: 0.3 /100 WBC
PLATELET # BLD AUTO: 177 K/UL (ref 164–446)
PMV BLD AUTO: 8.7 FL (ref 9–12.9)
RBC # BLD AUTO: 2.36 M/UL (ref 4.7–6.1)
WBC # BLD AUTO: 6.3 K/UL (ref 4.8–10.8)

## 2020-05-10 PROCEDURE — 36415 COLL VENOUS BLD VENIPUNCTURE: CPT

## 2020-05-10 PROCEDURE — A9270 NON-COVERED ITEM OR SERVICE: HCPCS | Performed by: INTERNAL MEDICINE

## 2020-05-10 PROCEDURE — 85025 COMPLETE CBC W/AUTO DIFF WBC: CPT

## 2020-05-10 PROCEDURE — A9270 NON-COVERED ITEM OR SERVICE: HCPCS | Performed by: FAMILY MEDICINE

## 2020-05-10 PROCEDURE — 700102 HCHG RX REV CODE 250 W/ 637 OVERRIDE(OP): Performed by: NURSE PRACTITIONER

## 2020-05-10 PROCEDURE — 700102 HCHG RX REV CODE 250 W/ 637 OVERRIDE(OP): Performed by: INTERNAL MEDICINE

## 2020-05-10 PROCEDURE — A9270 NON-COVERED ITEM OR SERVICE: HCPCS | Performed by: NURSE PRACTITIONER

## 2020-05-10 PROCEDURE — 700102 HCHG RX REV CODE 250 W/ 637 OVERRIDE(OP): Performed by: FAMILY MEDICINE

## 2020-05-10 PROCEDURE — 700111 HCHG RX REV CODE 636 W/ 250 OVERRIDE (IP): Performed by: INTERNAL MEDICINE

## 2020-05-10 PROCEDURE — 700102 HCHG RX REV CODE 250 W/ 637 OVERRIDE(OP): Performed by: HOSPITALIST

## 2020-05-10 PROCEDURE — 700105 HCHG RX REV CODE 258: Performed by: INTERNAL MEDICINE

## 2020-05-10 PROCEDURE — 306263 VAC CANNISTER W/GEL 500ML: Performed by: HOSPITALIST

## 2020-05-10 PROCEDURE — 770021 HCHG ROOM/CARE - ISO PRIVATE

## 2020-05-10 PROCEDURE — A9270 NON-COVERED ITEM OR SERVICE: HCPCS | Performed by: HOSPITALIST

## 2020-05-10 PROCEDURE — 99232 SBSQ HOSP IP/OBS MODERATE 35: CPT | Performed by: HOSPITALIST

## 2020-05-10 RX ADMIN — ATORVASTATIN CALCIUM 40 MG: 40 TABLET, FILM COATED ORAL at 05:26

## 2020-05-10 RX ADMIN — FERROUS GLUCONATE 324 MG: 324 TABLET ORAL at 08:21

## 2020-05-10 RX ADMIN — OXYCODONE 10 MG: 5 TABLET ORAL at 21:53

## 2020-05-10 RX ADMIN — LINEZOLID 600 MG: 600 TABLET, FILM COATED ORAL at 17:46

## 2020-05-10 RX ADMIN — OXYCODONE 10 MG: 5 TABLET ORAL at 04:21

## 2020-05-10 RX ADMIN — OXYCODONE 10 MG: 5 TABLET ORAL at 08:21

## 2020-05-10 RX ADMIN — DOCUSATE SODIUM 50 MG AND SENNOSIDES 8.6 MG 2 TABLET: 8.6; 5 TABLET, FILM COATED ORAL at 05:26

## 2020-05-10 RX ADMIN — OXYCODONE 10 MG: 5 TABLET ORAL at 17:46

## 2020-05-10 RX ADMIN — DOCUSATE SODIUM 50 MG AND SENNOSIDES 8.6 MG 2 TABLET: 8.6; 5 TABLET, FILM COATED ORAL at 17:46

## 2020-05-10 RX ADMIN — AMLODIPINE BESYLATE 10 MG: 10 TABLET ORAL at 05:26

## 2020-05-10 RX ADMIN — PIPERACILLIN AND TAZOBACTAM 4.5 G: 4; .5 INJECTION, POWDER, LYOPHILIZED, FOR SOLUTION INTRAVENOUS; PARENTERAL at 04:20

## 2020-05-10 RX ADMIN — LINEZOLID 600 MG: 600 TABLET, FILM COATED ORAL at 05:27

## 2020-05-10 RX ADMIN — SERTRALINE HYDROCHLORIDE 50 MG: 50 TABLET ORAL at 05:26

## 2020-05-10 RX ADMIN — ACETAMINOPHEN 650 MG: 325 TABLET, FILM COATED ORAL at 17:46

## 2020-05-10 RX ADMIN — PIPERACILLIN AND TAZOBACTAM 4.5 G: 4; .5 INJECTION, POWDER, LYOPHILIZED, FOR SOLUTION INTRAVENOUS; PARENTERAL at 21:50

## 2020-05-10 RX ADMIN — PIPERACILLIN AND TAZOBACTAM 4.5 G: 4; .5 INJECTION, POWDER, LYOPHILIZED, FOR SOLUTION INTRAVENOUS; PARENTERAL at 13:25

## 2020-05-10 RX ADMIN — OXYCODONE 10 MG: 5 TABLET ORAL at 13:24

## 2020-05-10 RX ADMIN — ACETAMINOPHEN 650 MG: 325 TABLET, FILM COATED ORAL at 08:20

## 2020-05-10 ASSESSMENT — ENCOUNTER SYMPTOMS
NERVOUS/ANXIOUS: 0
SORE THROAT: 0
EYE PAIN: 0
SENSORY CHANGE: 1
BLOOD IN STOOL: 0
NECK PAIN: 0
FOCAL WEAKNESS: 0
SHORTNESS OF BREATH: 0
TREMORS: 0
CHILLS: 0
EYE REDNESS: 0
FEVER: 0
MYALGIAS: 1
INSOMNIA: 0
NAUSEA: 0
FALLS: 0
BRUISES/BLEEDS EASILY: 1
DIZZINESS: 0
HEADACHES: 0
ABDOMINAL PAIN: 0
DEPRESSION: 0
VOMITING: 0
PALPITATIONS: 0
COUGH: 0
FLANK PAIN: 0

## 2020-05-10 ASSESSMENT — FIBROSIS 4 INDEX: FIB4 SCORE: 1.77

## 2020-05-10 ASSESSMENT — COGNITIVE AND FUNCTIONAL STATUS - GENERAL
SUGGESTED CMS G CODE MODIFIER MOBILITY: CJ
TOILETING: A LITTLE
CLIMB 3 TO 5 STEPS WITH RAILING: A LITTLE
WALKING IN HOSPITAL ROOM: A LITTLE
HELP NEEDED FOR BATHING: A LITTLE
DRESSING REGULAR UPPER BODY CLOTHING: A LITTLE
DRESSING REGULAR LOWER BODY CLOTHING: A LITTLE
DAILY ACTIVITIY SCORE: 20
MOBILITY SCORE: 20
SUGGESTED CMS G CODE MODIFIER DAILY ACTIVITY: CJ
STANDING UP FROM CHAIR USING ARMS: A LITTLE
MOVING FROM LYING ON BACK TO SITTING ON SIDE OF FLAT BED: A LITTLE

## 2020-05-10 NOTE — PROGRESS NOTES
"[facility] Medicine Daily Progress Note    Date of Service  5/10/2020    Chief Complaint  64 y.o. male admitted 4/16/2020 with Left leg redness and swelling.    Hospital Course    64 y.o. male who presented 4/16/2020 with left leg redness and swelling.  Patient has history of venous stasis dermatitis of both legs, and venous stasis ulcers of the left leg.  Patient states that he was discharged from wound care a month ago and said the ulcers had resolved.  But then a week ago he noted that ulcers came back he also noticed increasing redness and swelling and pain.  He denied having any fever or chills, cough or congestion, shortness of breath, abdominal pain, nausea or vomiting, or diarrhea.  On review of his previous wound cultures, he has history of MRSA, Pseudomonas, Serratia, and group B streptococcus.    Cultures on this admissioon grew MRSA and GAS. ID has been following his care. He was eventually transitioned to oral Zyvox. He was improving. His WBCs normalized, he remained afebrile, and he was looking stable for discharge home with antibiotics and wound care. However, after wound care changed the dressing 4/21, mian pus was noted from a tunneling lesion, suggesting need for further I&D.  Patient was taken to the operating room by Dr. Israel.  Initial reports from healthcare personnel suggest there was some component of \"necrotizing fat\".  Antibiotics were broadened slightly to include Unasyn.  The case was discussed with infectious disease who will be following closely.    Patient's undergone multiple washout procedures.  Cultures from deep tissue in the operating room have grown Pseudomonas and GAS.  Antibiotics have been extended accordingly and he is receiving Zyvox and Zosyn.    Interval Problem Update  Sleeping, no acute distress and no overnight events. Reports that he's doing \"fine\" today.     Consultants/Specialty  ID  LPS  Ortho    Code Status  Full    Disposition  Pending medical and surgical " clearance.     Review of Systems  Review of Systems   Constitutional: Negative for chills, fever and malaise/fatigue.   HENT: Negative for congestion and sore throat.    Eyes: Negative for pain and redness.   Respiratory: Negative for cough and shortness of breath.    Cardiovascular: Negative for chest pain, palpitations and leg swelling.   Gastrointestinal: Negative for abdominal pain, blood in stool, nausea and vomiting.   Genitourinary: Negative for flank pain.   Musculoskeletal: Positive for myalgias (left LE). Negative for falls and neck pain.   Skin: Negative for itching.   Neurological: Positive for sensory change. Negative for dizziness, tremors, focal weakness and headaches.   Endo/Heme/Allergies: Bruises/bleeds easily.   Psychiatric/Behavioral: Negative for depression. The patient is not nervous/anxious and does not have insomnia.    All other systems reviewed and are negative.       Physical Exam  Temp:  [36.7 °C (98 °F)-36.8 °C (98.2 °F)] 36.7 °C (98 °F)  Pulse:  [67-73] 73  Resp:  [16-18] 18  BP: (127-155)/(70-88) 155/82  SpO2:  [93 %-97 %] 96 %    Physical Exam  Vitals signs reviewed.   Constitutional:       General: He is not in acute distress.     Appearance: He is not diaphoretic.   HENT:      Head: Normocephalic and atraumatic.      Mouth/Throat:      Mouth: Mucous membranes are moist.   Eyes:      General:         Right eye: No discharge.         Left eye: No discharge.      Extraocular Movements: Extraocular movements intact.   Neck:      Musculoskeletal: Normal range of motion. No neck rigidity.   Cardiovascular:      Rate and Rhythm: Normal rate and regular rhythm.      Pulses: Normal pulses.   Pulmonary:      Effort: Pulmonary effort is normal. No respiratory distress.   Abdominal:      General: There is no distension.      Palpations: Abdomen is soft.      Tenderness: There is no abdominal tenderness.   Musculoskeletal: Normal range of motion.      Right lower leg: No edema.      Left lower  leg: No edema.   Skin:     General: Skin is warm.      Coloration: Skin is not jaundiced.      Findings: No bruising.      Comments: Wound vac in place along left lower leg   Neurological:      General: No focal deficit present.      Mental Status: He is alert and oriented to person, place, and time.      Cranial Nerves: No cranial nerve deficit.      Motor: No weakness.   Psychiatric:         Mood and Affect: Mood normal.         Behavior: Behavior normal. Behavior is cooperative.         Fluids    Intake/Output Summary (Last 24 hours) at 5/10/2020 0723  Last data filed at 5/9/2020 2030  Gross per 24 hour   Intake 1326 ml   Output 400 ml   Net 926 ml       Laboratory  Recent Labs     05/08/20  0107 05/09/20  0308 05/10/20  0300   WBC 7.5 7.2 6.3   RBC 2.21* 2.38* 2.36*   HEMOGLOBIN 7.0* 7.6* 7.3*   HEMATOCRIT 21.2* 23.0* 22.9*   MCV 95.9 96.6 97.0   MCH 31.7 31.9 30.9   MCHC 33.0* 33.0* 31.9*   RDW 50.9* 51.7* 50.9*   PLATELETCT 181 215 177   MPV 8.8* 8.9* 8.7*     Recent Labs     05/08/20  0107   SODIUM 136   POTASSIUM 4.6   CHLORIDE 104   CO2 22   GLUCOSE 94   BUN 19   CREATININE 1.07   CALCIUM 7.9*                   Imaging     Assessment/Plan  * Venous stasis ulcer of left lower extremity (HCC)- (present on admission)  Assessment & Plan  With surrounding cellulitis. Arterial US negative for arterial insufficiency.  - s/p washout on 4/22, 4/24, 4/27 and 5/4  - ID and orthopedic surgery following, appreciate recs  - continue with Zyvox and Zosyn, per ID recs  - possible surgery/skin graft on 5/13  - continue with wound care    Hyperkalemia  Assessment & Plan  K improved to 4.6.  - monitor and correct as needed    Venous stasis dermatitis of both lower extremities- (present on admission)  Assessment & Plan  Chronic venous stasis of bilateral LE.   - management as above    CKD (chronic kidney disease) stage 3, GFR 30-59 ml/min ([facility])- (present on admission)  Assessment & Plan  Acute on chronic resolved. Cr down to  1.07.  - avoid nephrotoxic agents; renally dose medications  - monitor intermittently    Essential hypertension- (present on admission)  Assessment & Plan  Intermittently hypertensive.   - continue home amlodipine  - PRN hydralazine    Constipation  Assessment & Plan  Last BM 5/10  - continue bowel protocol    Normocytic anemia- (present on admission)  Assessment & Plan  Likely anemia of chronic disease and in the setting of slow blood loss from his LE wound. Hemodynamically stable. Hemoglobin up to 7.6 today  - s/p 1U PRBC on 4/29 and 5/5  - transfuse if hemoglobin < 7  - continue to trend    Methamphetamine abuse (HCC)- (present on admission)  Assessment & Plan  Positive UDS for amphetamine, oxycodone and marijuana.  - continued counseling and education    Alcohol abuse- (present on admission)  Assessment & Plan  - out of withdrawal window  - continued counseling and education  - correct electrolytes as needed    Anxiety- (present on admission)  Assessment & Plan  Stable.   - continue home sertraline       VTE prophylaxis: No pharmacologic DVT prophylaxis 2/2 anemia requiring transfusions.

## 2020-05-10 NOTE — CARE PLAN
Problem: Infection  Goal: Will remain free from infection  Outcome: PROGRESSING AS EXPECTED  Note: IV abx infusing without difficulty, no signs and symptoms of infection.     Problem: Pain Management  Goal: Pain level will decrease to patient's comfort goal  Outcome: PROGRESSING AS EXPECTED  Note: Pain frequently assessed throughout shift. Pt reports LLE pain, PRN narcotic provided as ordered with relief. Non pharmacological interventions utilized.

## 2020-05-10 NOTE — CARE PLAN
Problem: Communication  Goal: The ability to communicate needs accurately and effectively will improve  Outcome: PROGRESSING AS EXPECTED  Note: Pt communicates appropriately, will continue to make staff aware of his needs     Problem: Safety  Goal: Will remain free from injury  Outcome: PROGRESSING AS EXPECTED  Note: Patient uses call bell appropriately when needing assistance, bed in low and locked position

## 2020-05-10 NOTE — PROGRESS NOTES
Assumed cares at 0700. Pt awake and alert during bedside shift report, no signs of distress. PIV infusing abx as ordered. Pt on RA. A&O x4. Wound vac intact at 125 mmHg. Bed in lowest locked position, call light in reach. Safety precautions maintained. Contact isolation continued.

## 2020-05-10 NOTE — PROGRESS NOTES
Assumed care for pt at 1900. Pt laying in bed comfortably A&Ox4, call bell in reach, bed in low and locked position, pt denies  Any pain or needs at this time. Will continue to monitor.

## 2020-05-11 LAB
ANION GAP SERPL CALC-SCNC: 11 MMOL/L (ref 7–16)
BASOPHILS # BLD AUTO: 0.5 % (ref 0–1.8)
BASOPHILS # BLD: 0.03 K/UL (ref 0–0.12)
BUN SERPL-MCNC: 15 MG/DL (ref 8–22)
CALCIUM SERPL-MCNC: 8.3 MG/DL (ref 8.5–10.5)
CHLORIDE SERPL-SCNC: 103 MMOL/L (ref 96–112)
CO2 SERPL-SCNC: 21 MMOL/L (ref 20–33)
CREAT SERPL-MCNC: 0.94 MG/DL (ref 0.5–1.4)
EOSINOPHIL # BLD AUTO: 0.28 K/UL (ref 0–0.51)
EOSINOPHIL NFR BLD: 4.8 % (ref 0–6.9)
ERYTHROCYTE [DISTWIDTH] IN BLOOD BY AUTOMATED COUNT: 49 FL (ref 35.9–50)
ERYTHROCYTE [SEDIMENTATION RATE] IN BLOOD BY WESTERGREN METHOD: 79 MM/HOUR (ref 0–20)
GLUCOSE SERPL-MCNC: 100 MG/DL (ref 65–99)
HCT VFR BLD AUTO: 23.7 % (ref 42–52)
HGB BLD-MCNC: 7.7 G/DL (ref 14–18)
IMM GRANULOCYTES # BLD AUTO: 0.03 K/UL (ref 0–0.11)
IMM GRANULOCYTES NFR BLD AUTO: 0.5 % (ref 0–0.9)
LYMPHOCYTES # BLD AUTO: 0.92 K/UL (ref 1–4.8)
LYMPHOCYTES NFR BLD: 15.8 % (ref 22–41)
MCH RBC QN AUTO: 30.9 PG (ref 27–33)
MCHC RBC AUTO-ENTMCNC: 32.5 G/DL (ref 33.7–35.3)
MCV RBC AUTO: 95.2 FL (ref 81.4–97.8)
MONOCYTES # BLD AUTO: 0.52 K/UL (ref 0–0.85)
MONOCYTES NFR BLD AUTO: 8.9 % (ref 0–13.4)
NEUTROPHILS # BLD AUTO: 4.04 K/UL (ref 1.82–7.42)
NEUTROPHILS NFR BLD: 69.5 % (ref 44–72)
NRBC # BLD AUTO: 0 K/UL
NRBC BLD-RTO: 0 /100 WBC
PLATELET # BLD AUTO: 183 K/UL (ref 164–446)
PMV BLD AUTO: 8.6 FL (ref 9–12.9)
POTASSIUM SERPL-SCNC: 4.2 MMOL/L (ref 3.6–5.5)
RBC # BLD AUTO: 2.49 M/UL (ref 4.7–6.1)
SODIUM SERPL-SCNC: 135 MMOL/L (ref 135–145)
WBC # BLD AUTO: 5.8 K/UL (ref 4.8–10.8)

## 2020-05-11 PROCEDURE — 85025 COMPLETE CBC W/AUTO DIFF WBC: CPT

## 2020-05-11 PROCEDURE — 700111 HCHG RX REV CODE 636 W/ 250 OVERRIDE (IP): Performed by: INTERNAL MEDICINE

## 2020-05-11 PROCEDURE — 700102 HCHG RX REV CODE 250 W/ 637 OVERRIDE(OP): Performed by: HOSPITALIST

## 2020-05-11 PROCEDURE — 97535 SELF CARE MNGMENT TRAINING: CPT

## 2020-05-11 PROCEDURE — 99232 SBSQ HOSP IP/OBS MODERATE 35: CPT | Performed by: HOSPITALIST

## 2020-05-11 PROCEDURE — 97530 THERAPEUTIC ACTIVITIES: CPT

## 2020-05-11 PROCEDURE — A9270 NON-COVERED ITEM OR SERVICE: HCPCS | Performed by: NURSE PRACTITIONER

## 2020-05-11 PROCEDURE — 99232 SBSQ HOSP IP/OBS MODERATE 35: CPT | Performed by: NURSE PRACTITIONER

## 2020-05-11 PROCEDURE — 770021 HCHG ROOM/CARE - ISO PRIVATE

## 2020-05-11 PROCEDURE — A9270 NON-COVERED ITEM OR SERVICE: HCPCS | Performed by: FAMILY MEDICINE

## 2020-05-11 PROCEDURE — 99233 SBSQ HOSP IP/OBS HIGH 50: CPT | Performed by: INTERNAL MEDICINE

## 2020-05-11 PROCEDURE — 700102 HCHG RX REV CODE 250 W/ 637 OVERRIDE(OP): Performed by: FAMILY MEDICINE

## 2020-05-11 PROCEDURE — 97605 NEG PRS WND THER DME<=50SQCM: CPT

## 2020-05-11 PROCEDURE — 36415 COLL VENOUS BLD VENIPUNCTURE: CPT

## 2020-05-11 PROCEDURE — 700111 HCHG RX REV CODE 636 W/ 250 OVERRIDE (IP): Performed by: FAMILY MEDICINE

## 2020-05-11 PROCEDURE — A9270 NON-COVERED ITEM OR SERVICE: HCPCS | Performed by: INTERNAL MEDICINE

## 2020-05-11 PROCEDURE — 700105 HCHG RX REV CODE 258: Performed by: INTERNAL MEDICINE

## 2020-05-11 PROCEDURE — 700102 HCHG RX REV CODE 250 W/ 637 OVERRIDE(OP): Performed by: NURSE PRACTITIONER

## 2020-05-11 PROCEDURE — 85652 RBC SED RATE AUTOMATED: CPT

## 2020-05-11 PROCEDURE — 80048 BASIC METABOLIC PNL TOTAL CA: CPT

## 2020-05-11 PROCEDURE — 700102 HCHG RX REV CODE 250 W/ 637 OVERRIDE(OP): Performed by: INTERNAL MEDICINE

## 2020-05-11 PROCEDURE — A9270 NON-COVERED ITEM OR SERVICE: HCPCS | Performed by: HOSPITALIST

## 2020-05-11 RX ORDER — LINEZOLID 600 MG/1
600 TABLET, FILM COATED ORAL EVERY 12 HOURS
Status: DISCONTINUED | OUTPATIENT
Start: 2020-05-11 | End: 2020-05-18

## 2020-05-11 RX ADMIN — OXYCODONE 10 MG: 5 TABLET ORAL at 17:47

## 2020-05-11 RX ADMIN — PIPERACILLIN AND TAZOBACTAM 4.5 G: 4; .5 INJECTION, POWDER, LYOPHILIZED, FOR SOLUTION INTRAVENOUS; PARENTERAL at 12:46

## 2020-05-11 RX ADMIN — PIPERACILLIN AND TAZOBACTAM 4.5 G: 4; .5 INJECTION, POWDER, LYOPHILIZED, FOR SOLUTION INTRAVENOUS; PARENTERAL at 21:46

## 2020-05-11 RX ADMIN — OXYCODONE 10 MG: 5 TABLET ORAL at 08:53

## 2020-05-11 RX ADMIN — ATORVASTATIN CALCIUM 40 MG: 40 TABLET, FILM COATED ORAL at 05:18

## 2020-05-11 RX ADMIN — OXYCODONE 10 MG: 5 TABLET ORAL at 04:08

## 2020-05-11 RX ADMIN — AMLODIPINE BESYLATE 10 MG: 10 TABLET ORAL at 05:18

## 2020-05-11 RX ADMIN — PIPERACILLIN AND TAZOBACTAM 4.5 G: 4; .5 INJECTION, POWDER, LYOPHILIZED, FOR SOLUTION INTRAVENOUS; PARENTERAL at 05:00

## 2020-05-11 RX ADMIN — DOCUSATE SODIUM 50 MG AND SENNOSIDES 8.6 MG 2 TABLET: 8.6; 5 TABLET, FILM COATED ORAL at 05:18

## 2020-05-11 RX ADMIN — ONDANSETRON 4 MG: 4 TABLET, ORALLY DISINTEGRATING ORAL at 08:36

## 2020-05-11 RX ADMIN — OXYCODONE 10 MG: 5 TABLET ORAL at 21:47

## 2020-05-11 RX ADMIN — LINEZOLID 600 MG: 600 TABLET, FILM COATED ORAL at 17:47

## 2020-05-11 RX ADMIN — MORPHINE SULFATE 4 MG: 4 INJECTION INTRAVENOUS at 10:00

## 2020-05-11 RX ADMIN — ONDANSETRON 4 MG: 4 TABLET, ORALLY DISINTEGRATING ORAL at 03:48

## 2020-05-11 RX ADMIN — SERTRALINE HYDROCHLORIDE 50 MG: 50 TABLET ORAL at 05:18

## 2020-05-11 RX ADMIN — LINEZOLID 600 MG: 600 TABLET, FILM COATED ORAL at 05:18

## 2020-05-11 RX ADMIN — ONDANSETRON 4 MG: 2 INJECTION INTRAMUSCULAR; INTRAVENOUS at 20:16

## 2020-05-11 ASSESSMENT — ENCOUNTER SYMPTOMS
DEPRESSION: 0
INSOMNIA: 0
FLANK PAIN: 0
MYALGIAS: 1
HEADACHES: 0
BRUISES/BLEEDS EASILY: 1
CHILLS: 0
ABDOMINAL PAIN: 0
NAUSEA: 1
FOCAL WEAKNESS: 0
DIZZINESS: 0
TREMORS: 0
NECK PAIN: 0
SHORTNESS OF BREATH: 0
COUGH: 0
BLOOD IN STOOL: 0
VOMITING: 0
SORE THROAT: 0
NERVOUS/ANXIOUS: 0
FEVER: 0
FALLS: 0
PALPITATIONS: 0
SENSORY CHANGE: 1
EYE PAIN: 0

## 2020-05-11 ASSESSMENT — COGNITIVE AND FUNCTIONAL STATUS - GENERAL
DAILY ACTIVITIY SCORE: 21
HELP NEEDED FOR BATHING: A LITTLE
SUGGESTED CMS G CODE MODIFIER DAILY ACTIVITY: CJ
DRESSING REGULAR LOWER BODY CLOTHING: A LITTLE
TOILETING: A LITTLE

## 2020-05-11 NOTE — PROGRESS NOTES
Infectious Disease Progress Note    Author: Mayte Fuller M.D. Date & Time of service: 5/11/2020  12:12 PM    Chief Complaint:  Follow-up for necrotizing LLE infection/cellulitis    Interval History:  64-year-old male with methamphetamine abuse, hepatitis C status post treatment, bilateral lower extremity venous stasis dermatitis with ulcerations, admitted 4/16/2020 with left leg cellulitis and fever  4/25 afebrile, white count 10.2, tolerating Zyvox and Zosyn.  Reports feeling better overall.  4/26 afebrile, white count 7.6, tolerating antibiotics with no new issues.  4/27 afebrile WBC 8.3 plan for repeat I&D today, denies pain, no new clinical issues  4/28 AF WBC 14.3 S/p repeat I&D yesterday. Wound vac removed given excessive bloody drainage. Rate pain 6-7/10 in severity.  4/30 AF WBC 7.5 had blood transfusion over night and feeling better. No new complaints. LLE dressed without any bloody drainage  5/1 AF WBC 7.3 LLE dressed-pain controlled States plan for skin graft possibly Mon. No new complaints  5/2 AF WBC 8 no new complaints-tolerated VAC change yesterday. Denies SE abx  5/3 AF no new complaints-plan for OR tomorrow  5/4 AF WBC 8.2-plan for OR still today-no time yet. No new complaints  5/5 AF WBC 8.3 seen with wound care-skin graft deferred due to continued necrosis and purulence ant tibial wound -possible placement tomorrow-denies SE abx-posterior calf wound healing well. Eating well  5/6 AF LPS note appreciated-surgery changed to 5/11-change VAC to Veraflo. Continues to tolerate abx well. No new complaints. Pain controlled  5/7 AF WBC 7.4 sleeping soundly at time of rounds. No acute events  5/11 AF WBC 5.8 platelets 183 Pain controlled. Tolerating abx. No new complaints  Labs Reviewed and Medications Reviewed.    Review of Systems:  Review of Systems   Constitutional: Negative for fever.   Respiratory: Negative for shortness of breath.    Cardiovascular: Negative for chest pain.   Musculoskeletal:  Positive for myalgias.   Skin: Negative for rash.   All other systems reviewed and are negative.      Hemodynamics:  Temp (24hrs), Av.8 °C (98.2 °F), Min:36.7 °C (98 °F), Max:36.9 °C (98.4 °F)  Temperature: 36.7 °C (98.1 °F)  Pulse  Av.9  Min: 54  Max: 127   Blood Pressure: 150/94       Physical Exam:  Physical Exam  Vitals signs and nursing note reviewed.   Constitutional:       General: He is not in acute distress.     Appearance: He is not ill-appearing, toxic-appearing or diaphoretic.      Comments: disheveled   HENT:      Nose: No rhinorrhea.      Mouth/Throat:      Comments: Poor dentition  Eyes:      General:         Right eye: No discharge.         Left eye: No discharge.   Cardiovascular:      Rate and Rhythm: Normal rate.   Pulmonary:      Effort: Pulmonary effort is normal. No respiratory distress.      Breath sounds: No stridor. No wheezing or rhonchi.   Abdominal:      General: There is no distension.      Palpations: Abdomen is soft.   Musculoskeletal:         General: Deformity present.      Comments: Left lower extremity dressed/VAC  Wound pictures reviewed  Wound 23 cm X 15 X 0.6 cm with necrosis   Lymphadenopathy:      Cervical: No cervical adenopathy.   Skin:     General: Skin is warm.      Coloration: Skin is not jaundiced.      Findings: No rash.   Neurological:      Cranial Nerves: No cranial nerve deficit.         Meds:    Current Facility-Administered Medications:   •  linezolid  •  [COMPLETED] piperacillin-tazobactam **AND** piperacillin-tazobactam  •  silver nitrate  •  amLODIPine  •  diphenhydrAMINE  •  senna-docusate  •  sertraline  •  polyethylene glycol/lytes  •  magnesium hydroxide  •  atorvastatin  •  ferrous gluconate  •  acetaminophen  •  ondansetron  •  ondansetron  •  promethazine  •  promethazine  •  prochlorperazine  •  hydrALAZINE  •  oxyCODONE immediate-release  •  morphine injection    Labs:  Recent Labs     20  0308 05/10/20  0300 20  0113   WBC 7.2 6.3  5.8   RBC 2.38* 2.36* 2.49*   HEMOGLOBIN 7.6* 7.3* 7.7*   HEMATOCRIT 23.0* 22.9* 23.7*   MCV 96.6 97.0 95.2   MCH 31.9 30.9 30.9   RDW 51.7* 50.9* 49.0   PLATELETCT 215 177 183   MPV 8.9* 8.7* 8.6*   NEUTSPOLYS 63.30 63.40 69.50   LYMPHOCYTES 17.90* 18.50* 15.80*   MONOCYTES 13.10 10.80 8.90   EOSINOPHILS 4.30 6.20 4.80   BASOPHILS 0.60 0.50 0.50     Recent Labs     05/11/20  0113   SODIUM 135   POTASSIUM 4.2   CHLORIDE 103   CO2 21   GLUCOSE 100*   BUN 15     Recent Labs     05/11/20  0113   CREATININE 0.94       Imaging:  Ct-extremity, Lower With Left    Result Date: 4/21/2020 4/21/2020 5:16 PM HISTORY/REASON FOR EXAM:  Lower leg pain, suspected osteomyelitis. TECHNIQUE/EXAM DESCRIPTION AND NUMBER OF VIEWS:  CT scan of the LEFT lower extremity with contrast, with reconstructions. Thin helical 3 mm sections were obtained from the distal femur through the proximal tibia/fibula. Sagittal and coronal multiplanar reconstructions were generated from the axial images. A total of 100 mL of Omnipaque 350 nonionic contrast was administered  IV without complication. Up to date radiation dose reduction adjustments have been utilized to meet ALARA standards for radiation dose reduction. COMPARISON: 4/16/2020. FINDINGS: No acute fracture or dislocation is identified. Cellulitis of the lower leg, with soft tissue prominence and subcutaneous air. No subjacent cortical bone destruction to suggest osteomyelitis. No drainable abscess. There is chronic appearing valgus deviation of the foot at the subtalar joint, and there is suspected pes planovalgus. Moderate subtalar osteoarthrosis. Multifocal mild to moderate osteoarthrosis in the midfoot. Mild multifocal interphalangeal osteoarthrosis. Reactive popliteal lymphadenopathy.     1. No evidence of osteomyelitis. 2. Cellulitis and soft tissue organs of the left lower leg. Subcutaneous air without drainable abscess. 3. Suspected pes planovalgus deformity. Multifocal osteoarthrosis of  the midfoot.    Ct-extremity, Lower With Left    Result Date: 4/16/2020 4/16/2020 1:45 PM HISTORY/REASON FOR EXAM:  Lower leg swelling/redness, cellulitis suspected. Left lower extremity edema and redness TECHNIQUE/EXAM DESCRIPTION AND NUMBER OF VIEWS:  CT scan of the LEFT lower extremity with contrast, with reconstructions. Thin helical 3 mm sections were obtained from the distal femur through the proximal tibia/fibula. Sagittal and coronal multiplanar reconstructions were generated from the axial images. A total of 100 mL of Omnipaque 350 nonionic contrast was administered  IV without complication. Up to date radiation dose reduction adjustments have been utilized to meet ALARA standards for radiation dose reduction. COMPARISON: 12/26/2019 FINDINGS: There is diffuse subcutaneous edema in the lower leg. The overlying skin is markedly thickened. There is curvilinear fluid deep to the fascia, but superficial to the gastrocnemius muscle extending from the knee joint to the ankle. Edema extends into the deeper fascial planes. No soft tissue gas is identified. No definite muscle necrosis is identified. No thrombus is identified. The Achilles tendon is intact. The bones are osteopenic. No cortical destruction is identified.     1.  Extensive subcutaneous edema with overlying skin thickening. Inflammation extends along the fascial planes with curvilinear fluid superficial to the gastrocnemius muscle. No soft tissue gas to suggest necrotizing fasciitis. 2.  Osteopenia    Us-renal    Result Date: 4/19/2020 4/19/2020 10:53 AM HISTORY/REASON FOR EXAM:  Acute renal failure TECHNIQUE/EXAM DESCRIPTION: Renal ultrasound. COMPARISON:  08/15/2018 FINDINGS: The right kidney measures 8.11 cm. The left kidney measures 9.17 cm. There is no hydronephrosis. There are no abnormal calcifications. Small right pleural effusion is identified. The bladder demonstrates no focal wall abnormality.     1.  Right kidney appears smaller than left  kidney which could be due to atrophy. 2.  No hydronephrosis. 3.  Incidentally noted small right pleural effusion.      Micro:  Results     ** No results found for the last 168 hours. **          Assessment/Plan:  Left lower extremity cellulitis, complicated. Poor healing  Chronic venous stasis   Afebrile  Resolved leukocytosis  CT LLE on 4/21 with no OM or drainable abscess  s/p I&D on 4/22 down to muscle  S/p repeat I and D on 4/24 of mult compartments and placement of wound VAC: skin at the margins necrotic  S/p repeat I&D and WV change on 4/27 by Dr. John  Blood culture negative  Wound cultures on 4/22 +GAS and MRSA  Intraoperative wound cx + pseudomonas aeruginosa and GAS  Repeat I&D on 5/4/2020-no skin graft due to necrosis  Continue linezolid and IV zosyn for now  Monitor CBC while on linezolid-platelets stable  Planned graft date 5/11/2020  Unclear stop date given continued necrosis-anticipate 7 days from date of skin graft    Methamphetamine abuse  Not a candidate for OPIC    Hepatitis C   status post treatment  No additional treatment at this time    LUPE, improved  Avoiding nephrotoxic agents      Prognosis for limb salvage guarded

## 2020-05-11 NOTE — WOUND TEAM
[facility]  Inpatient Services  Wound and Skin Care Follow Up    Admission Date: 4/16/2020     Last order of IP CONSULT TO WOUND CARE was found on 4/24/2020 from Hospital Encounter on 4/16/2020     HPI, PMH, SH: Reviewed    Unit where seen by Wound Team: [id number]     WOUND CONSULT/FOLLOW UP RELATED TO:  LLE Vac change     Self Report / Pain Level:  Pre-medicated with PO and IV medication.        OBJECTIVE:  Tubi  in place, vac dressings in place and sponge compressed.    WOUND TYPE, LOCATION, CHARACTERISTICS (Pressure Injuries: location, stage, POA or date identified)          Negative Pressure Wound Therapy 05/05/20 Surgical (Active)   NPWT Pump Mode / Pressure Setting Ulta 5/11/2020  9:30 AM   Dressing Type Black Foam (Veraflo);Black Foam (Regular);Medium 5/11/2020  9:30 AM   Number of Foam Pieces Used 10 5/11/2020  9:30 AM   Canister Changed No 5/11/2020  9:30 AM   Output (mL) 475 mL 5/10/2020  8:21 AM   NEXT Dressing Change/Treatment Date 05/13/20 5/11/2020  9:30 AM   VAC VeraFlo Irrigant Normal Saline 5/11/2020  9:30 AM   VAC VeraFlo Soak Time (mins) 6 5/11/2020  9:30 AM   VAC VeraFlo Instill Volume (ml) 20 5/11/2020  9:30 AM   VAC VeraFlo - Therapy Time (hrs) 2 5/11/2020  9:30 AM   VAC VeraFlo Pressure (mm/Hg) Intermittent;125 mmHg 5/11/2020  9:30 AM           Wound 05/05/20 Full Thickness Wound Leg Lateral;Lower;Posterior;Medial Left open complicated surgical (Active)   Wound Image   5/8/2020  3:00 PM   Site Assessment PATITO 5/11/2020 10:00 AM   Periwound Assessment PATITO 5/11/2020 10:00 AM   Margins Attached edges;Defined edges 5/11/2020  9:30 AM   Closure Secondary intention 5/11/2020  9:30 AM   Drainage Amount Moderate 5/11/2020  9:30 AM   Drainage Description Serosanguineous 5/11/2020  9:30 AM   Treatments Cleansed;Site care 5/11/2020  9:30 AM   Wound Cleansing Approved Wound Cleanser 5/11/2020  9:30 AM   Periwound Protectant Benzoin;Drape;Paste Ring 5/11/2020  9:30 AM   Dressing  Cleansing/Solutions Normal Saline 5/11/2020  9:30 AM   Dressing Options Wound Vac 5/11/2020 10:00 AM   Dressing Changed Changed 5/11/2020  9:30 AM   Dressing Status Clean;Dry;Intact 5/11/2020  9:30 AM   Dressing Change/Treatment Frequency Monday, Wednesday, Friday, and As Needed 5/11/2020  9:30 AM   NEXT Dressing Change/Treatment Date 05/13/20 5/11/2020  9:30 AM   NEXT Weekly Photo (Inpatient Only) 05/15/20 5/11/2020  9:30 AM   Non-staged Wound Description Full thickness 5/11/2020  9:30 AM   Wound Length (cm) 23 cm 5/8/2020  3:00 PM   Wound Width (cm) 15 cm 5/8/2020  3:00 PM   Wound Depth (cm) 0.6 cm 5/8/2020  3:00 PM   Wound Surface Area (cm^2) 345 cm^2 5/8/2020  3:00 PM   Wound Volume (cm^3) 207 cm^3 5/8/2020  3:00 PM   Tunneling (cm) 2 cm 5/8/2020  3:00 PM   Tunneling Clock Position of Wound 11 5/8/2020  3:00 PM   Shape irregular 5/11/2020  9:30 AM   Wound Odor None 5/11/2020  9:30 AM   Pulses Left;1+;DP 5/8/2020  3:00 PM   Exposed Structures Tendon;Muscle 5/11/2020  9:30 AM            Vascular:    BHUPINDER:   No results found.         Culture:   Obtained,   Culture Results show:  Recent Results (from the past 720 hour(s))   CULTURE WOUND W/ GRAM STAIN    Collection Time: 04/22/20 12:03 PM   Result Value Ref Range    Significant Indicator POS (POS)     Source WND     Site Left Leg     Culture Result - (A)     Gram Stain Result Moderate WBCs.  No organisms seen.       Culture Result (A)      Pseudomonas aeruginosa  Light growth  P.aeruginosa may develop resistance during prolonged therapy  with all antibiotics. Isolates that are initially susceptible  may become resistant within three to four days after  initiation of therapy. Testing of repeat isolates may be  warranted.      Culture Result (A)      Beta Hemolytic Streptococcus group A  Rare growth         Susceptibility    Pseudomonas aeruginosa - SHANON     Ceftazidime <=1 Sensitive mcg/mL     Ciprofloxacin >2 Resistant mcg/mL     Cefepime 4 Sensitive mcg/mL      Amikacin <=16 Sensitive mcg/mL     Gentamicin <=4 Sensitive mcg/mL     Tobramycin <=4 Sensitive mcg/mL     Meropenem <=1 Sensitive mcg/mL     Pip/Tazobactam 64 Sensitive mcg/mL         INTERVENTIONS BY WOUND TEAM:  Dressing removed from lateral wound cleansed with wound cleanser. No active bleeding, no fluctuance noted. Perico SALAZARN for LPS in to see wound. Marylou wound prepped with benzoin and paste ring. Veraflo sponge applied to wound bed and secured with drape, trac pad applied. Veraflo therapy initiated, instilling 20ml of NS soaking for 6 minutes every 2 hours. Medial wound vac removed and cleansed with wound cleanser. Paste ring to marylou wound, regular vac sponge to wound bed. Secured with drape. Trac pad applied to lateral side and seal obtained and therapy resumed at 125mmHg Y connected. L leg then wrapped in 2 layer compression.     Interdisciplinary consultation: Patient, Bedside RN, MINERVA APRN Perico    EVALUATION: Patient admitted with LLE redness and swelling, venous ulcers present, developed an abcess and gas trapped underneath surface, multiple I&D's and Necrotic tissue developed. Had to be excised on the lateral leg down to the fascia. LLE now presents with Lateral surgical wound and venous ulcers to the anterior leg/medial/posterior ankle. Venous ulcers will benefit from NPWT to manage drainage, assist in debridement, and properly heal the wound beds. Will continue regular NPWT on venous ulcer sites. Lateral incisional wound veraflow NPWT to maintain a moist wound bed, manage drainage, encourage granulation, and keep the wound clean.     Goals: Steady decrease in wound area and depth weekly.    NURSING PLAN OF CARE ORDERS (X):    Dressing changes: See Dressing Care orders: X  Skin care: See Skin Care orders: X  Rectal tube care: See Rectal Tube Care orders:   Other orders:    WOUND TEAM PLAN OF CARE:   Dressing changes by wound team:          Follow up 1-2 times weekly:               Follow up 3 times  weekly:                NPWT change 3 times weekly:   X  Follow up as needed:       Other (explain):     Anticipated discharge plans:   LTACH:    X    SNF/Rehab:   X               Home Care:           Outpatient Wound Center:            Self Care:

## 2020-05-11 NOTE — DISCHARGE PLANNING
Anticipated Discharge Disposition:   · TBD    Action:   · Pt discussed in IDT rounds. Pt currently still pending graft which has been pushed to 05/13/2020 at this time. Pt is not medically clear for discharge and discharge needs are unknown pending graft.     Barriers to Discharge:   · TBD    Plan:   · Care coordination will continue to follow up and provide assistance with discharge plans/barriers.

## 2020-05-11 NOTE — PROGRESS NOTES
"[facility] Medicine Daily Progress Note    Date of Service  5/11/2020    Chief Complaint  64 y.o. male admitted 4/16/2020 with Left leg redness and swelling.    Hospital Course    64 y.o. male who presented 4/16/2020 with left leg redness and swelling.  Patient has history of venous stasis dermatitis of both legs, and venous stasis ulcers of the left leg.  Patient states that he was discharged from wound care a month ago and said the ulcers had resolved.  But then a week ago he noted that ulcers came back he also noticed increasing redness and swelling and pain.  He denied having any fever or chills, cough or congestion, shortness of breath, abdominal pain, nausea or vomiting, or diarrhea.  On review of his previous wound cultures, he has history of MRSA, Pseudomonas, Serratia, and group B streptococcus.    Cultures on this admissioon grew MRSA and GAS. ID has been following his care. He was eventually transitioned to oral Zyvox. He was improving. His WBCs normalized, he remained afebrile, and he was looking stable for discharge home with antibiotics and wound care. However, after wound care changed the dressing 4/21, mian pus was noted from a tunneling lesion, suggesting need for further I&D.  Patient was taken to the operating room by Dr. Israel.  Initial reports from healthcare personnel suggest there was some component of \"necrotizing fat\".  Antibiotics were broadened slightly to include Unasyn.  The case was discussed with infectious disease who will be following closely.    Patient's undergone multiple washout procedures.  Cultures from deep tissue in the operating room have grown Pseudomonas and GAS.  Antibiotics have been extended accordingly and he is receiving Zyvox and Zosyn.    Interval Problem Update  Having some nausea this AM, just given zofran. Denies any associated symptoms or other concerns. Tentative plan for skin graft, date is pending.    Consultants/Specialty  ID  LPS  Ortho    Code " Status  Full    Disposition  Pending medical and surgical clearance.     Review of Systems  Review of Systems   Constitutional: Negative for chills, fever and malaise/fatigue.   HENT: Negative for congestion and sore throat.    Eyes: Negative for pain.   Respiratory: Negative for cough and shortness of breath.    Cardiovascular: Negative for chest pain, palpitations and leg swelling.   Gastrointestinal: Positive for nausea. Negative for abdominal pain, blood in stool and vomiting.   Genitourinary: Negative for flank pain.   Musculoskeletal: Positive for myalgias (left LE). Negative for falls and neck pain.   Skin: Negative for itching.   Neurological: Positive for sensory change. Negative for dizziness, tremors, focal weakness and headaches.   Endo/Heme/Allergies: Bruises/bleeds easily.   Psychiatric/Behavioral: Negative for depression. The patient is not nervous/anxious and does not have insomnia.    All other systems reviewed and are negative.       Physical Exam  Temp:  [36.6 °C (97.9 °F)-36.9 °C (98.4 °F)] 36.8 °C (98.2 °F)  Pulse:  [72-84] 84  Resp:  [16-19] 18  BP: (144-158)/(85-89) 149/85  SpO2:  [92 %-99 %] 94 %    Physical Exam  Vitals signs reviewed.   Constitutional:       General: He is not in acute distress.     Appearance: He is not diaphoretic.   HENT:      Head: Normocephalic and atraumatic.      Mouth/Throat:      Mouth: Mucous membranes are moist.   Eyes:      General:         Right eye: No discharge.         Left eye: No discharge.      Extraocular Movements: Extraocular movements intact.   Neck:      Musculoskeletal: Normal range of motion. No neck rigidity.   Cardiovascular:      Rate and Rhythm: Normal rate and regular rhythm.      Pulses: Normal pulses.   Pulmonary:      Effort: Pulmonary effort is normal. No respiratory distress.   Abdominal:      General: There is no distension.      Palpations: Abdomen is soft.      Tenderness: There is no abdominal tenderness.   Musculoskeletal: Normal  range of motion.      Right lower leg: No edema.      Left lower leg: No edema.   Skin:     General: Skin is warm.      Coloration: Skin is not jaundiced.      Findings: No bruising.      Comments: Wound vac in place along left lower leg   Neurological:      General: No focal deficit present.      Mental Status: He is alert and oriented to person, place, and time.      Cranial Nerves: No cranial nerve deficit.      Motor: No weakness.   Psychiatric:         Mood and Affect: Mood normal.         Behavior: Behavior normal. Behavior is cooperative.     Patient seen and examined today on 5/11, unchanged from 5/10.     Fluids    Intake/Output Summary (Last 24 hours) at 5/11/2020 0717  Last data filed at 5/10/2020 1748  Gross per 24 hour   Intake 120 ml   Output 1375 ml   Net -1255 ml       Laboratory  Recent Labs     05/09/20  0308 05/10/20  0300 05/11/20  0113   WBC 7.2 6.3 5.8   RBC 2.38* 2.36* 2.49*   HEMOGLOBIN 7.6* 7.3* 7.7*   HEMATOCRIT 23.0* 22.9* 23.7*   MCV 96.6 97.0 95.2   MCH 31.9 30.9 30.9   MCHC 33.0* 31.9* 32.5*   RDW 51.7* 50.9* 49.0   PLATELETCT 215 177 183   MPV 8.9* 8.7* 8.6*     Recent Labs     05/11/20  0113   SODIUM 135   POTASSIUM 4.2   CHLORIDE 103   CO2 21   GLUCOSE 100*   BUN 15   CREATININE 0.94   CALCIUM 8.3*                   Imaging     Assessment/Plan  * Venous stasis ulcer of left lower extremity (HCC)- (present on admission)  Assessment & Plan  With surrounding cellulitis improving. Arterial US negative for arterial insufficiency.  - s/p washout on 4/22, 4/24, 4/27 and 5/4  - ID and orthopedic surgery following, appreciate recs  - continue with Zyvox and Zosyn, per ID recs  - abx to be continued x7 days post skin graft  - continue with wound care; per LPS, tendon still visible so recommending surgery for skin graft be postponed    Hyperkalemia  Assessment & Plan  K improved to 4.2.   - monitor and correct as needed    Venous stasis dermatitis of both lower extremities- (present on  admission)  Assessment & Plan  Chronic venous stasis of bilateral LE.   - management as above    CKD (chronic kidney disease) stage 3, GFR 30-59 ml/min ([facility])- (present on admission)  Assessment & Plan  Acute on chronic resolved. Cr now normalized.   - avoid nephrotoxic agents; renally dose medications  - monitor intermittently    Essential hypertension- (present on admission)  Assessment & Plan  Intermittently hypertensive.   - continue home amlodipine  - PRN hydralazine    Constipation  Assessment & Plan  Improving. Last BM 5/11  - continue bowel protocol    Normocytic anemia- (present on admission)  Assessment & Plan  Likely anemia of chronic disease and in the setting of slow blood loss from his LE wound, which has now improved. Hemodynamically stable. Hemoglobin up to 7.7 today  - s/p 1U PRBC on 4/29 and 5/5  - transfuse if hemoglobin < 7  - continue to trend    Methamphetamine abuse ([facility])- (present on admission)  Assessment & Plan  Positive UDS for amphetamine, oxycodone and marijuana.  - continued counseling and education    Alcohol abuse- (present on admission)  Assessment & Plan  - out of withdrawal window  - continued counseling and education  - correct electrolytes PRN    Anxiety- (present on admission)  Assessment & Plan  Stable.   - continue home sertraline       VTE prophylaxis: No pharmacologic DVT prophylaxis 2/2 anemia requiring transfusions. SCD on non-affected leg.

## 2020-05-11 NOTE — PROGRESS NOTES
Assumed care at 1900.   Patient resting quietly in bed.  IV TKO,  PRN oxy and tylenol for pain.   No new complaints.  WCTM.

## 2020-05-11 NOTE — PROGRESS NOTES
LIMB PRESERVATION SERVICE   POST SURGICAL PROGRESS NOTE      HPI:  64 y.o.  with a past medical history that includes hypertension, hepatitis C, kidney disease, polysubstance abuse, nondiabetic Charcot's deformity left foot, and chronic venous stasis ulcers bilateral lower extremities admitted 4/16/2020 for Cellulitis of left lower extremity.        LPS has been consulted for evaluation of left lower leg venous stasis ulcers.  The patient has a history of chronic venous stasis ulcers and has been seen in outpatient wound clinic for treatment as well as infectious disease in October 2019 for an infected skin ulcer.  He reports that his previous venostasis ulcers had resolved approximately 1 month ago and he was discharged from the outpatient wound clinic.  He states that 1 week ago (4/12/2020) he was in the shower and noticed that he developed an ulcer on the anterior aspect of his lower leg distal to his knee and then the ulcer had continued to worsen from there radiating to the back of his left lower leg and development of new ulcers down his left lateral lower leg to his ankle.  He reports having fever, chills, and a large amount of clear drainage from his ulcers.  He states that 4/15/2020 his left lower leg began to swell and developed redness.  He was seen in the outpatient wound center where he was found to have significant swelling and erythema to his left lower extremity, weeping from superficial tissue, and foul-smelling odor.    Patient proceeded to ED.  ESR 97, CRP 31.7.  He was started on vancomycin and Zosyn IV and admitted under hospital services.     SURGERY DATE: 04/22/20  PROCEDURE: with Dr. Israel  1.  Left leg irrigation and debridement, multiple compartments, deep down to   the level of muscle.  2.  Left fasciotomy anterior and posterior compartments.  3.  Left placement of wound VAC.    SURGERY DATE: 04/24/20  PROCEDURE: with Dr. Israel  1.  Left leg irrigation and debridement, multiple  compartments.  2.  Placement of wound VAC.    SURGERY DATE: 04/27/20  PROCEDURE: with Dr. John  1.  Left leg irrigation and debridement, multiple compartments.  2.  Placement of wound VAC.    SURGERY DATE: 05/4/2020 with Dr. Israel and Dr. John  PROCEDURE: Left leg irrigation and debridement.         4/22/2020: Patient denies fevers, chills, nausea, vomiting.  Complains of pain with inspection of lower leg ulcers.  Tract with purulent drainage noted yesterday by wound team.  Repeat CT ordered and was negative for abscess or osteomyelitis.  Patient has been n.p.o. since last night.  Dressings that were changed yesterday are completely saturated today.   Patient also has Charcot foot to left foot and complains of severe pain to foot when walking.  4/28/2020: Patient denies fevers, chills, nausea, vomiting.  Pain well controlled.   LPS was called by bedside RN to assess left LE.  Wound VAC placed last night, clotted off when patient arrived to floor, troubleshoot by night RN, unable to resolve.  NOC RN removed drape (not the foam) and the wound started bleeding.  NOC held pressure to wound bed, and applied pressure dressing.    4/29/2020: Patient's Left LE dressing intact, removed pressure dressing with wound care RN, Oriana Barcenas.  Still open bleeding with light trickle of, used AgNO3 to help stop small bleeds, larger bleeds pressure dressing replaced by wound RNOriana.   5/01/2020: Patient denies fever, chills, n/v.  No complaints of pain.  Dressing removed and wounds assessed.  Wound VAC was placed on Left LE Anterior/Lateral wound.  2 layer compression wrap placed to left LE.  Plan for patient to go to surgery on Monday.   5/5/2020: POD #1 S/P left leg I&D.  Denies any fevers, chills, nausea, vomiting.  Seen with wound team during VAC placement to left lower leg.  5/6/2020: POD #2 S/P left leg I&D.  N.p.o. for possible surgery.  Surgery canceled.  Reschedule for next week.  Wound team to apply veraflo to left  lateral calf ulcer  5/8/2020: POD #4 S/P left leg I&D.  N.p.o. for possible surgery today.  LLE ulcers seen with wound team.  Tendon visible, viable, clean to lateral calf.  Tract proximally has decreased.  Plan for split-thickness skin graft next week.  Continue with veraflo to lateral calf and regular VAC to posterior medial lower leg.  5/11/2020: POD #7 S/P left leg I&D.  Seen with Wound team for veraflo VAC change. Tendon remains visible to lateral calf, tract at 11:00 of 2.5cm.  Postpone STSG until next week.              PERTINENT LPS RESULTS:   No new pertinent results    SURGICAL SITE EXAM:      /85   Pulse 84   Temp 36.8 °C (98.2 °F) (Temporal)   Resp 18   Ht 1.829 m (6')   Wt 82.2 kg (181 lb 3.5 oz)   SpO2 94%   BMI 24.58 kg/m²     Pedal Pulses: palpable pulses  Sensation: LOPS  Foot warm to touch    Left lateral calf:   Red granular tissue-  Tendon remains visible.  New granular buds starting to form around tendon.  Tract at 11:00  remains approximately the same at 2.5 cm  Proximal area with increased granular tissue  No odor  No erythema  edema has decreased      Left posterior medial ankle:  Small incision with sutures approximated.  Slightly macerated   Incision surrounded by full-thickness wound with 100% slightly smooth granular tissue.  No odor  Edema has decreased  No erythema    Left anterior lower leg ulcer:  Full-thickness 100% slightly smooth red granular tissue  No odor  Edema has decreased  No erythema        Wound care completed by Sigrid Bess RN with wound team.  See note and flowsheet for further detail.      DIABETES MANAGEMENT:    Blood glucose:   Results from last 7 days   Lab Units 05/04/20 2039   ACCU CHECK GLUCOSE 788 mg/dL 90     A1c:   Lab Results   Component Value Date/Time    HBA1C 5.7 (H) 01/23/2020 11:22 AM            INFECTION MANAGEMENT:    Results from last 7 days   Lab Units 05/11/20  0113 05/10/20  0300 05/09/20  0308 05/08/20  0107 05/07/20  0214  05/06/20  0214   WBC 1501 K/uL 5.8 6.3 7.2 7.5 7.4 6.9   PLATELET COUNT 1518 K/uL 183 177 215 181 188 175     Wound culture results:   Results     Procedure Component Value Units Date/Time    SARS-CoV-2, PCR (In-House) [[id number]] Collected:  05/04/20 0948    Order Status:  Completed Updated:  05/04/20 1139     SARS-CoV-2 Source NP Swab     SARS-CoV-2 by PCR NotDetected     Comment: [facility] providers: PLEASE REFER TO DE-ESCALATION AND RETESTING PROTOCOL  on [email or web address]  **The [employer] GeneXpert Xpress SARS-CoV-2 Test has been made available for  use under the Emergency Use Authorization (EUA) only.         Narrative:       Contact  Rule-out COVID-19 panel, includes droplet/contact/eye  isolation order.  Check influenza to order this test only if  specifically indicated.  Please run rapid test, pt is pre op for today.    COVID/SARS CoV-2 [[id number]] Collected:  05/04/20 0948    Order Status:  Completed Specimen:  Respirate from Nasopharyngeal Updated:  05/04/20 1001     COVID Order Status Received    Narrative:       Contact  Rule-out COVID-19 panel, includes droplet/contact/eye  isolation order.  Check influenza to order this test only if  specifically indicated.  Please run rapid test, pt is pre op for today.               ASSESSMENT/PLAN:   Patient POD # 7 S/P left lower leg I&D.  Patient has had 4 I&D's to this area thus far.   Tract to proximal lateral calf remains the same.  No further bleeding noted.  Smooth granular tissue noted to all wound beds to left leg.  Tendon to lateral calf remains visible, new granular beds forming.   Continue with VERAFLO wound VAC to lateral and anterior calf and regular VAC to posterior medial lower leg.  Plan for split-thickness skin graft next week             Wound care:   - wound team to manage wound care.   -regular VAC to left posterior medial lower leg ulcers   -VERAFLO to left lateral calf and anterior proximal leg, decrease frequency of fluid, increased soak time  -2  layer compression wrap to LLE to control edema  -veraflo VAC change and 2 layer compression wrap 3x/wk     Vascular status:   - Palpable pulses    Antibiotics:   - ID following  -continue on zosyn and linezolid    Weight Bearing Status:   -Weight bearing as tolerated    Offloading:   -offload with pillows    PT/OT :   -involved,       Plan to return to O.R.:   STSG surgery postponed this week as tendon remains visible and 2.5 cm tract remains to lateral calf.    Return to surgery for STSG on Monday 5/18/2020 with Dr. Israel          DISCHARGE PLAN:    Disposition: recommend SNF or Rehab     Follow-up: pending      Discussed with: pt, RN, Dr. John, Dr. Israel, Sigrid Bess RN wound team,         [email or web address]    If any questions or concerns, please call [phone]

## 2020-05-12 LAB
ALBUMIN SERPL BCP-MCNC: 3 G/DL (ref 3.2–4.9)
ALP SERPL-CCNC: 82 U/L (ref 30–99)
ALT SERPL-CCNC: 9 U/L (ref 2–50)
AST SERPL-CCNC: 11 U/L (ref 12–45)
BASOPHILS # BLD AUTO: 0.7 % (ref 0–1.8)
BASOPHILS # BLD: 0.04 K/UL (ref 0–0.12)
BILIRUB CONJ SERPL-MCNC: <0.2 MG/DL (ref 0.1–0.5)
BILIRUB INDIRECT SERPL-MCNC: ABNORMAL MG/DL (ref 0–1)
BILIRUB SERPL-MCNC: 0.4 MG/DL (ref 0.1–1.5)
EOSINOPHIL # BLD AUTO: 0.22 K/UL (ref 0–0.51)
EOSINOPHIL NFR BLD: 4 % (ref 0–6.9)
ERYTHROCYTE [DISTWIDTH] IN BLOOD BY AUTOMATED COUNT: 50.6 FL (ref 35.9–50)
HCT VFR BLD AUTO: 22.6 % (ref 42–52)
HGB BLD-MCNC: 7.4 G/DL (ref 14–18)
IMM GRANULOCYTES # BLD AUTO: 0.01 K/UL (ref 0–0.11)
IMM GRANULOCYTES NFR BLD AUTO: 0.2 % (ref 0–0.9)
LIPASE SERPL-CCNC: 8 U/L (ref 11–82)
LYMPHOCYTES # BLD AUTO: 0.83 K/UL (ref 1–4.8)
LYMPHOCYTES NFR BLD: 15.2 % (ref 22–41)
MCH RBC QN AUTO: 31.6 PG (ref 27–33)
MCHC RBC AUTO-ENTMCNC: 32.7 G/DL (ref 33.7–35.3)
MCV RBC AUTO: 96.6 FL (ref 81.4–97.8)
MONOCYTES # BLD AUTO: 0.55 K/UL (ref 0–0.85)
MONOCYTES NFR BLD AUTO: 10.1 % (ref 0–13.4)
NEUTROPHILS # BLD AUTO: 3.8 K/UL (ref 1.82–7.42)
NEUTROPHILS NFR BLD: 69.8 % (ref 44–72)
NRBC # BLD AUTO: 0.03 K/UL
NRBC BLD-RTO: 0.6 /100 WBC
PLATELET # BLD AUTO: 158 K/UL (ref 164–446)
PMV BLD AUTO: 8.8 FL (ref 9–12.9)
PROT SERPL-MCNC: 6.2 G/DL (ref 6–8.2)
RBC # BLD AUTO: 2.34 M/UL (ref 4.7–6.1)
WBC # BLD AUTO: 5.5 K/UL (ref 4.8–10.8)

## 2020-05-12 PROCEDURE — A9270 NON-COVERED ITEM OR SERVICE: HCPCS | Performed by: INTERNAL MEDICINE

## 2020-05-12 PROCEDURE — 700102 HCHG RX REV CODE 250 W/ 637 OVERRIDE(OP): Performed by: INTERNAL MEDICINE

## 2020-05-12 PROCEDURE — 83690 ASSAY OF LIPASE: CPT

## 2020-05-12 PROCEDURE — 770021 HCHG ROOM/CARE - ISO PRIVATE

## 2020-05-12 PROCEDURE — A9270 NON-COVERED ITEM OR SERVICE: HCPCS | Performed by: FAMILY MEDICINE

## 2020-05-12 PROCEDURE — 85025 COMPLETE CBC W/AUTO DIFF WBC: CPT

## 2020-05-12 PROCEDURE — A9270 NON-COVERED ITEM OR SERVICE: HCPCS | Performed by: NURSE PRACTITIONER

## 2020-05-12 PROCEDURE — 700102 HCHG RX REV CODE 250 W/ 637 OVERRIDE(OP): Performed by: FAMILY MEDICINE

## 2020-05-12 PROCEDURE — 700102 HCHG RX REV CODE 250 W/ 637 OVERRIDE(OP): Performed by: HOSPITALIST

## 2020-05-12 PROCEDURE — 700105 HCHG RX REV CODE 258: Performed by: INTERNAL MEDICINE

## 2020-05-12 PROCEDURE — 99232 SBSQ HOSP IP/OBS MODERATE 35: CPT | Performed by: INTERNAL MEDICINE

## 2020-05-12 PROCEDURE — A9270 NON-COVERED ITEM OR SERVICE: HCPCS | Performed by: HOSPITALIST

## 2020-05-12 PROCEDURE — 99233 SBSQ HOSP IP/OBS HIGH 50: CPT | Performed by: INTERNAL MEDICINE

## 2020-05-12 PROCEDURE — 80076 HEPATIC FUNCTION PANEL: CPT

## 2020-05-12 PROCEDURE — 36415 COLL VENOUS BLD VENIPUNCTURE: CPT

## 2020-05-12 PROCEDURE — 700111 HCHG RX REV CODE 636 W/ 250 OVERRIDE (IP): Performed by: FAMILY MEDICINE

## 2020-05-12 PROCEDURE — 306263 VAC CANNISTER W/GEL 500ML: Performed by: INTERNAL MEDICINE

## 2020-05-12 PROCEDURE — 700111 HCHG RX REV CODE 636 W/ 250 OVERRIDE (IP): Performed by: INTERNAL MEDICINE

## 2020-05-12 PROCEDURE — 700102 HCHG RX REV CODE 250 W/ 637 OVERRIDE(OP): Performed by: NURSE PRACTITIONER

## 2020-05-12 RX ORDER — LOSARTAN POTASSIUM 25 MG/1
25 TABLET ORAL
Status: DISCONTINUED | OUTPATIENT
Start: 2020-05-12 | End: 2020-05-21 | Stop reason: HOSPADM

## 2020-05-12 RX ADMIN — FERROUS GLUCONATE 324 MG: 324 TABLET ORAL at 09:10

## 2020-05-12 RX ADMIN — PIPERACILLIN AND TAZOBACTAM 4.5 G: 4; .5 INJECTION, POWDER, LYOPHILIZED, FOR SOLUTION INTRAVENOUS; PARENTERAL at 12:13

## 2020-05-12 RX ADMIN — OXYCODONE 10 MG: 5 TABLET ORAL at 21:01

## 2020-05-12 RX ADMIN — DOCUSATE SODIUM 50 MG AND SENNOSIDES 8.6 MG 2 TABLET: 8.6; 5 TABLET, FILM COATED ORAL at 17:02

## 2020-05-12 RX ADMIN — ACETAMINOPHEN 650 MG: 325 TABLET, FILM COATED ORAL at 09:09

## 2020-05-12 RX ADMIN — LOSARTAN POTASSIUM 25 MG: 25 TABLET, FILM COATED ORAL at 12:09

## 2020-05-12 RX ADMIN — LINEZOLID 600 MG: 600 TABLET, FILM COATED ORAL at 05:22

## 2020-05-12 RX ADMIN — OXYCODONE 10 MG: 5 TABLET ORAL at 15:12

## 2020-05-12 RX ADMIN — SERTRALINE HYDROCHLORIDE 50 MG: 50 TABLET ORAL at 05:22

## 2020-05-12 RX ADMIN — PROCHLORPERAZINE EDISYLATE 10 MG: 5 INJECTION INTRAMUSCULAR; INTRAVENOUS at 15:12

## 2020-05-12 RX ADMIN — ATORVASTATIN CALCIUM 40 MG: 40 TABLET, FILM COATED ORAL at 05:22

## 2020-05-12 RX ADMIN — PIPERACILLIN AND TAZOBACTAM 4.5 G: 4; .5 INJECTION, POWDER, LYOPHILIZED, FOR SOLUTION INTRAVENOUS; PARENTERAL at 20:56

## 2020-05-12 RX ADMIN — ONDANSETRON 4 MG: 2 INJECTION INTRAMUSCULAR; INTRAVENOUS at 17:02

## 2020-05-12 RX ADMIN — PIPERACILLIN AND TAZOBACTAM 4.5 G: 4; .5 INJECTION, POWDER, LYOPHILIZED, FOR SOLUTION INTRAVENOUS; PARENTERAL at 05:23

## 2020-05-12 RX ADMIN — OXYCODONE 10 MG: 5 TABLET ORAL at 09:10

## 2020-05-12 RX ADMIN — AMLODIPINE BESYLATE 10 MG: 10 TABLET ORAL at 05:22

## 2020-05-12 RX ADMIN — LINEZOLID 600 MG: 600 TABLET, FILM COATED ORAL at 17:02

## 2020-05-12 RX ADMIN — OXYCODONE 10 MG: 5 TABLET ORAL at 05:22

## 2020-05-12 RX ADMIN — ONDANSETRON 4 MG: 2 INJECTION INTRAMUSCULAR; INTRAVENOUS at 09:12

## 2020-05-12 ASSESSMENT — ENCOUNTER SYMPTOMS
FALLS: 0
PALPITATIONS: 0
WEAKNESS: 0
BLURRED VISION: 0
COUGH: 0
DEPRESSION: 0
VOMITING: 0
ABDOMINAL PAIN: 0
NAUSEA: 1
SHORTNESS OF BREATH: 0
HEMOPTYSIS: 0
NERVOUS/ANXIOUS: 0
WHEEZING: 0
SINUS PAIN: 0
FEVER: 0
DIZZINESS: 0
CHILLS: 0
CONSTIPATION: 0
SPUTUM PRODUCTION: 0
SORE THROAT: 0
HEADACHES: 0
DIARRHEA: 0
MYALGIAS: 1

## 2020-05-12 NOTE — PROGRESS NOTES
Assumed cares at 0700. Pt sleeping during bedside shift report, no signs of distress. PIV infusing. Pt on RA. Wound vac c/d/i, running at 125 mmHg. Bed in lowest locked position, call light in reach. Safety precautions maintained.

## 2020-05-12 NOTE — CARE PLAN
Problem: Safety  Goal: Will remain free from injury  Outcome: PROGRESSING AS EXPECTED   Patient alert and oriented. Resting in bed. Uses call light for assistance. Will continue to monitor.    Problem: Knowledge Deficit  Goal: Knowledge of disease process/condition, treatment plan, diagnostic tests, and medications will improve  Outcome: PROGRESSING AS EXPECTED   RN discussed plan of care with patient including pain medications. Patient verbalizes understanding.

## 2020-05-12 NOTE — PROGRESS NOTES
[facility] Medicine Daily Progress Note    Date of Service  5/12/2020    Chief Complaint  64 y.o. male admitted 4/16/2020 with left leg redness and swelling    Hospital Course    Mr. Goran Chavez is a 64 y.o. male with past medical history significant for venous stasis dermatitis and stasis ulcers of bilateral lower extremities who presented on 4/16/2020 with left leg redness and swelling.  Patient was previously followed by wound care as an outpatient but was discharged when ulcers had resolved, but ulcers came back 1 week prior to presentation.  Patient has had multiple incision and drainages with a wound VAC in place.  Intraoperative wound culture grew Pseudomonas and group A strep.  Current plans for skin graft.        Interval Problem Update  Patient was seen and examined at bedside.  I have personally reviewed vitals, labs, and imaging.    5/12.  Patient has been hypertensive.  Patient denies fever, chills, chest pain, shortness of breath.  Patient does report nausea but is able to keep food down and has good p.o. intake.    Consultants/Specialty  Infectious disease  LPS  Ortho    Code Status  Full    Disposition  Pending medical and surgical clearance    Review of Systems  Review of Systems   Constitutional: Negative for chills and fever.   HENT: Negative for congestion, sinus pain and sore throat.    Eyes: Negative for blurred vision.   Respiratory: Negative for cough, hemoptysis, sputum production, shortness of breath and wheezing.    Cardiovascular: Negative for chest pain, palpitations and leg swelling.   Gastrointestinal: Positive for nausea. Negative for abdominal pain, constipation, diarrhea and vomiting.   Genitourinary: Negative for dysuria, frequency and urgency.   Musculoskeletal: Negative for falls.   Skin: Positive for rash (Left lower extremity).   Neurological: Negative for dizziness, weakness and headaches.   Psychiatric/Behavioral: Negative for depression. The patient is not  nervous/anxious.    All other systems reviewed and are negative.       Physical Exam  Temp:  [36.6 °C (97.8 °F)-37.1 °C (98.7 °F)] 36.8 °C (98.2 °F)  Pulse:  [73-80] 80  Resp:  [17-19] 18  BP: (134-145)/(75-89) 145/85  SpO2:  [90 %-97 %] 92 %    Physical Exam  Vitals signs and nursing note reviewed.   Constitutional:       General: He is not in acute distress.     Appearance: Normal appearance. He is normal weight.   HENT:      Head: Normocephalic and atraumatic.      Nose: Nose normal.      Mouth/Throat:      Mouth: Mucous membranes are moist.      Pharynx: Oropharynx is clear.   Eyes:      Extraocular Movements: Extraocular movements intact.      Conjunctiva/sclera: Conjunctivae normal.   Neck:      Musculoskeletal: Normal range of motion and neck supple.   Cardiovascular:      Rate and Rhythm: Normal rate and regular rhythm.      Pulses: Normal pulses.      Heart sounds: Normal heart sounds. No murmur. No friction rub. No gallop.    Pulmonary:      Effort: Pulmonary effort is normal. No respiratory distress.      Breath sounds: Normal breath sounds. No wheezing or rales.   Chest:      Chest wall: No tenderness.   Abdominal:      General: Abdomen is flat. Bowel sounds are normal. There is no distension.      Palpations: Abdomen is soft. There is no mass.      Tenderness: There is no abdominal tenderness. There is no guarding.   Musculoskeletal: Normal range of motion.      Comments: Wound VAC on left lower extremity.   Skin:     General: Skin is warm.      Capillary Refill: Capillary refill takes less than 2 seconds.   Neurological:      General: No focal deficit present.      Mental Status: He is alert and oriented to person, place, and time. Mental status is at baseline.      Cranial Nerves: No cranial nerve deficit.      Motor: No weakness.   Psychiatric:         Mood and Affect: Mood normal.         Behavior: Behavior normal.         Thought Content: Thought content normal.         Judgment: Judgment normal.          Fluids    Intake/Output Summary (Last 24 hours) at 5/12/2020 1052  Last data filed at 5/12/2020 0523  Gross per 24 hour   Intake 120 ml   Output 600 ml   Net -480 ml       Laboratory  Recent Labs     05/10/20  0300 05/11/20  0113 05/12/20  0340   WBC 6.3 5.8 5.5   RBC 2.36* 2.49* 2.34*   HEMOGLOBIN 7.3* 7.7* 7.4*   HEMATOCRIT 22.9* 23.7* 22.6*   MCV 97.0 95.2 96.6   MCH 30.9 30.9 31.6   MCHC 31.9* 32.5* 32.7*   RDW 50.9* 49.0 50.6*   PLATELETCT 177 183 158*   MPV 8.7* 8.6* 8.8*     Recent Labs     05/11/20  0113   SODIUM 135   POTASSIUM 4.2   CHLORIDE 103   CO2 21   GLUCOSE 100*   BUN 15   CREATININE 0.94   CALCIUM 8.3*                   Imaging  IR-US GUIDED PIV   Final Result    Ultrasound-guided PERIPHERAL IV INSERTION performed by    qualified nursing staff as above.            CT-EXTREMITY, LOWER WITH LEFT   Final Result         1. No evidence of osteomyelitis.   2. Cellulitis and soft tissue organs of the left lower leg. Subcutaneous air without drainable abscess.   3. Suspected pes planovalgus deformity. Multifocal osteoarthrosis of the midfoot.      US-RENAL   Final Result      1.  Right kidney appears smaller than left kidney which could be due to atrophy.      2.  No hydronephrosis.      3.  Incidentally noted small right pleural effusion.      CT-EXTREMITY, LOWER WITH LEFT   Final Result      1.  Extensive subcutaneous edema with overlying skin thickening. Inflammation extends along the fascial planes with curvilinear fluid superficial to the gastrocnemius muscle. No soft tissue gas to suggest necrotizing fasciitis.      2.  Osteopenia           Assessment/Plan  * Venous stasis ulcer of left lower extremity (HCC)- (present on admission)  Assessment & Plan  With surrounding cellulitis improving. Arterial US negative for arterial insufficiency.  - s/p washout on 4/22, 4/24, 4/27 and 5/4  - ID and orthopedic surgery following, appreciate recs  - continue with linezolid and Zosyn, per ID recs  - abx  to be continued x7 days post skin graft  - continue with wound care; per LPS, tendon still visible so recommending surgery for skin graft be postponed    Hyperkalemia  Assessment & Plan  Resolved.  Continue to monitor    Venous stasis dermatitis of both lower extremities- (present on admission)  Assessment & Plan  Chronic venous stasis of bilateral LE.   - management as above    CKD (chronic kidney disease) stage 3, GFR 30-59 ml/min ([facility])- (present on admission)  Assessment & Plan  Acute on chronic resolved. Cr now normalized.   - avoid nephrotoxic agents; renally dose medications  Monitor kidney function and urine output    Essential hypertension- (present on admission)  Assessment & Plan  Intermittently hypertensive.   - continue home amlodipine.  Started on losartan.  - PRN hydralazine    Constipation  Assessment & Plan  Improving.   - continue bowel regimen    Normocytic anemia- (present on admission)  Assessment & Plan  Likely anemia of chronic disease and in the setting of slow blood loss from his LE wound, which has now improved.   No active signs of bleeding.    Patient is hemodynamically stable and asymptomatic  Will continue to trend with CBC  s/p 1U PRBC on 4/29 and 5/5  Continue iron supplementation  Transfuse to maintain hemoglobin greater than 7.  Results from last 7 days   Lab Units 05/12/20  0340 05/11/20  0113 05/10/20  0300 05/09/20  0308   HGB 1503 g/dL 7.4* 7.7* 7.3* 7.6*   HCT 1504 % 22.6* 23.7* 22.9* 23.0*   MCV 1505 fL 96.6 95.2 97.0 96.6     Folate -Folic Acid   Date Value Ref Range Status   12/28/2019 19.6 >4.0 ng/mL Final     Vitamin B12 -True Cobalamin   Date Value Ref Range Status   04/22/2020 1374 (H) 211 - 911 pg/mL Final     Reticulocyte Count   Date Value Ref Range Status   12/28/2019 2.1 0.8 - 2.1 % Final     Retic, Absolute   Date Value Ref Range Status   12/28/2019 0.08 (H) 0.04 - 0.06 M/uL Final     Imm. Reticulocyte Fraction   Date Value Ref Range Status   12/28/2019 24.4 (H) 9.3  - 17.4 % Final     Retic Hgb Equivalent   Date Value Ref Range Status   12/28/2019 29.2 29.0 - 35.0 pg/cell Final      Ferritin   Date Value Ref Range Status   12/28/2019 77.7 22.0 - 322.0 ng/mL Final     No results found for: TRANSFERRIN  Iron   Date Value Ref Range Status   04/22/2020 27 (L) 50 - 180 ug/dL Final     Total Iron Binding   Date Value Ref Range Status   04/22/2020 131 (L) 250 - 450 ug/dL Final     % Saturation   Date Value Ref Range Status   04/22/2020 21 15 - 55 % Final         Methamphetamine abuse (HCC)- (present on admission)  Assessment & Plan  Positive UDS for amphetamine, oxycodone and marijuana.  - continued counseling and education    Alcohol abuse- (present on admission)  Assessment & Plan  - out of withdrawal window  - continued counseling and education  - correct electrolytes PRN    Anxiety- (present on admission)  Assessment & Plan  Stable.   - continue home sertraline  Monitor closely for serotonin syndrome as patient is also on linezolid    Gastrointestinal prophylaxis: The patient has no risk factors for developing gastric ulcers or gastritis.  As the patient is tolerating oral intake, GI prophylaxis is not indicated at this time.  Antibiotics: IV Zosyn, linezolid  Diet Order Regular  Code status: Full  Prognosis: Guarded  Risk: The Patient is at HIGH risk for complications and decompensation secondary to his multiple cormorbidities including left lower extremity cellulitis complicated by poor healing from venous stasis dermatitis and venous stasis ulcers requiring IV antibiotics, multiple incision and drainage with washout, and wound VAC.  Hypertension, chronic kidney disease, anemia    I have personally reviewed notes, labs, vitals, imaging.  I discussed the plan of care with bedside RN as well as on multidisciplinary rounds     VTE prophylaxis: SCD on right leg.  Avoid anticoagulation in the setting of anemia requiring transfusions

## 2020-05-12 NOTE — THERAPY
"Occupational Therapy Treatment completed with focus on ADLs, ADL transfers and patient education.  Functional Status: Supine > EOB supv, transfers with FWW supv, LB dressing min A, seated grooming supv  Plan of Care: Will benefit from Occupational Therapy 1 times per week  Discharge Recommendations:  Equipment Will Continue to Assess for Equipment Needs. Post-acute therapy: Will reassess closer to DC (pt had significant wound care needs).    See \"Rehab Therapy-Acute\" Patient Summary Report for complete documentation.     Pt seen for OT tx. Still awaiting graft to LLE. Per RN, pt has been mobilizing to/from bathroom with nursing, but spends limited time OOB. Unfortunately pt is not appropriate to mobilize out of room due to wound vac and isolation status. Educated pt on importance of being OOB to maintain condition, mitigate complications of bedrest. Please encourage pt to be up to chair for all meals. Pt mobilized to EOB. Completed LB dressing and transfer training with FWW. Declined to remain up to chair or stand at sink for grooming due to nausea. Pt is not appropriate to use sock-aid for LLE due to compressive wrap in place and open areas to foot. Pt did doff/don R sock without AE and donned hosp pants without AE. Pt is nearing plateau from OT standpoint in this setting. Will continue to follow at lower frequency with plan to progress seated bathing once wound can be fully covered (likely after graft and wound vac DC).   "

## 2020-05-12 NOTE — PROGRESS NOTES
Infectious Disease Progress Note    Author: Mayte Fuller M.D. Date & Time of service: 5/12/2020  11:36 AM    Chief Complaint:  Follow-up for necrotizing LLE infection/cellulitis    Interval History:  64-year-old male with methamphetamine abuse, hepatitis C status post treatment, bilateral lower extremity venous stasis dermatitis with ulcerations, admitted 4/16/2020 with left leg cellulitis and fever  4/25 afebrile, white count 10.2, tolerating Zyvox and Zosyn.  Reports feeling better overall.  4/27 afebrile WBC 8.3 plan for repeat I&D today, denies pain, no new clinical issues  4/28 AF WBC 14.3 S/p repeat I&D yesterday. Wound vac removed given excessive bloody drainage. Rate pain 6-7/10 in severity.  4/30 AF WBC 7.5 had blood transfusion over night and feeling better. No new complaints. LLE dressed without any bloody drainage  5/1 AF WBC 7.3 LLE dressed-pain controlled States plan for skin graft possibly Mon. No new complaints  5/2 AF WBC 8 no new complaints-tolerated VAC change yesterday. Denies SE abx  5/3 AF no new complaints-plan for OR tomorrow  5/4 AF WBC 8.2-plan for OR still today-no time yet. No new complaints  5/5 AF WBC 8.3 seen with wound care-skin graft deferred due to continued necrosis and purulence ant tibial wound -possible placement tomorrow-denies SE abx-posterior calf wound healing well. Eating well  5/6 AF LPS note appreciated-surgery changed to 5/11-change VAC to [place]flo. Continues to tolerate abx well. No new complaints. Pain controlled  5/11 AF WBC 5.8 platelets 183 Pain controlled. Tolerating abx. No new complaints  5/12 AF WBC 5.5 graft delayed due to continued undermining c/o nausea today-no specific abd pain-has generlized discomfort. No emesis or diarrhea  Labs Reviewed and Medications Reviewed.    Review of Systems:  Review of Systems   Constitutional: Negative for fever.   Respiratory: Negative for shortness of breath.    Cardiovascular: Negative for chest pain.    Gastrointestinal: Positive for nausea. Negative for abdominal pain, diarrhea and vomiting.   Musculoskeletal: Positive for myalgias.   Skin: Negative for rash.   All other systems reviewed and are negative.      Hemodynamics:  Temp (24hrs), Av.8 °C (98.2 °F), Min:36.6 °C (97.8 °F), Max:37.1 °C (98.7 °F)  Temperature: 36.8 °C (98.2 °F)  Pulse  Av  Min: 54  Max: 127   Blood Pressure: 145/85       Physical Exam:  Physical Exam  Vitals signs and nursing note reviewed.   Constitutional:       General: He is not in acute distress.     Appearance: He is not ill-appearing, toxic-appearing or diaphoretic.      Comments: disheveled   HENT:      Nose: No rhinorrhea.      Mouth/Throat:      Comments: Poor dentition  Eyes:      General: No scleral icterus.        Right eye: No discharge.         Left eye: No discharge.      Extraocular Movements: Extraocular movements intact.      Pupils: Pupils are equal, round, and reactive to light.   Cardiovascular:      Rate and Rhythm: Normal rate.   Pulmonary:      Effort: Pulmonary effort is normal. No respiratory distress.      Breath sounds: No stridor. No wheezing or rhonchi.   Abdominal:      General: There is no distension.      Palpations: Abdomen is soft. There is no mass.      Tenderness: There is no abdominal tenderness. There is no right CVA tenderness, left CVA tenderness, guarding or rebound.   Musculoskeletal:         General: Deformity present.      Comments: Left lower extremity dressed/VAC  Wound pictures reviewed  Wound 23 cm X 15 X 0.6 cm with necrosis   Lymphadenopathy:      Cervical: No cervical adenopathy.   Skin:     General: Skin is warm.      Coloration: Skin is not jaundiced.      Findings: No rash.   Neurological:      General: No focal deficit present.      Mental Status: He is alert and oriented to person, place, and time.      Cranial Nerves: No cranial nerve deficit.   Psychiatric:         Mood and Affect: Mood normal.         Behavior: Behavior  normal.         Meds:    Current Facility-Administered Medications:   •  losartan  •  linezolid  •  [COMPLETED] piperacillin-tazobactam **AND** piperacillin-tazobactam  •  silver nitrate  •  amLODIPine  •  diphenhydrAMINE  •  senna-docusate  •  sertraline  •  polyethylene glycol/lytes  •  magnesium hydroxide  •  atorvastatin  •  ferrous gluconate  •  acetaminophen  •  ondansetron  •  ondansetron  •  promethazine  •  promethazine  •  prochlorperazine  •  hydrALAZINE  •  oxyCODONE immediate-release  •  morphine injection    Labs:  Recent Labs     05/10/20  0300 05/11/20  0113 05/12/20  0340   WBC 6.3 5.8 5.5   RBC 2.36* 2.49* 2.34*   HEMOGLOBIN 7.3* 7.7* 7.4*   HEMATOCRIT 22.9* 23.7* 22.6*   MCV 97.0 95.2 96.6   MCH 30.9 30.9 31.6   RDW 50.9* 49.0 50.6*   PLATELETCT 177 183 158*   MPV 8.7* 8.6* 8.8*   NEUTSPOLYS 63.40 69.50 69.80   LYMPHOCYTES 18.50* 15.80* 15.20*   MONOCYTES 10.80 8.90 10.10   EOSINOPHILS 6.20 4.80 4.00   BASOPHILS 0.50 0.50 0.70     Recent Labs     05/11/20  0113   SODIUM 135   POTASSIUM 4.2   CHLORIDE 103   CO2 21   GLUCOSE 100*   BUN 15     Recent Labs     05/11/20  0113   CREATININE 0.94       Imaging:  Ct-extremity, Lower With Left    Result Date: 4/21/2020 4/21/2020 5:16 PM HISTORY/REASON FOR EXAM:  Lower leg pain, suspected osteomyelitis. TECHNIQUE/EXAM DESCRIPTION AND NUMBER OF VIEWS:  CT scan of the LEFT lower extremity with contrast, with reconstructions. Thin helical 3 mm sections were obtained from the distal femur through the proximal tibia/fibula. Sagittal and coronal multiplanar reconstructions were generated from the axial images. A total of 100 mL of Omnipaque 350 nonionic contrast was administered  IV without complication. Up to date radiation dose reduction adjustments have been utilized to meet ALARA standards for radiation dose reduction. COMPARISON: 4/16/2020. FINDINGS: No acute fracture or dislocation is identified. Cellulitis of the lower leg, with soft tissue prominence and  subcutaneous air. No subjacent cortical bone destruction to suggest osteomyelitis. No drainable abscess. There is chronic appearing valgus deviation of the foot at the subtalar joint, and there is suspected pes planovalgus. Moderate subtalar osteoarthrosis. Multifocal mild to moderate osteoarthrosis in the midfoot. Mild multifocal interphalangeal osteoarthrosis. Reactive popliteal lymphadenopathy.     1. No evidence of osteomyelitis. 2. Cellulitis and soft tissue organs of the left lower leg. Subcutaneous air without drainable abscess. 3. Suspected pes planovalgus deformity. Multifocal osteoarthrosis of the midfoot.    Ct-extremity, Lower With Left    Result Date: 4/16/2020 4/16/2020 1:45 PM HISTORY/REASON FOR EXAM:  Lower leg swelling/redness, cellulitis suspected. Left lower extremity edema and redness TECHNIQUE/EXAM DESCRIPTION AND NUMBER OF VIEWS:  CT scan of the LEFT lower extremity with contrast, with reconstructions. Thin helical 3 mm sections were obtained from the distal femur through the proximal tibia/fibula. Sagittal and coronal multiplanar reconstructions were generated from the axial images. A total of 100 mL of Omnipaque 350 nonionic contrast was administered  IV without complication. Up to date radiation dose reduction adjustments have been utilized to meet ALARA standards for radiation dose reduction. COMPARISON: 12/26/2019 FINDINGS: There is diffuse subcutaneous edema in the lower leg. The overlying skin is markedly thickened. There is curvilinear fluid deep to the fascia, but superficial to the gastrocnemius muscle extending from the knee joint to the ankle. Edema extends into the deeper fascial planes. No soft tissue gas is identified. No definite muscle necrosis is identified. No thrombus is identified. The Achilles tendon is intact. The bones are osteopenic. No cortical destruction is identified.     1.  Extensive subcutaneous edema with overlying skin thickening. Inflammation extends along  the fascial planes with curvilinear fluid superficial to the gastrocnemius muscle. No soft tissue gas to suggest necrotizing fasciitis. 2.  Osteopenia    Us-renal    Result Date: 4/19/2020 4/19/2020 10:53 AM HISTORY/REASON FOR EXAM:  Acute renal failure TECHNIQUE/EXAM DESCRIPTION: Renal ultrasound. COMPARISON:  08/15/2018 FINDINGS: The right kidney measures 8.11 cm. The left kidney measures 9.17 cm. There is no hydronephrosis. There are no abnormal calcifications. Small right pleural effusion is identified. The bladder demonstrates no focal wall abnormality.     1.  Right kidney appears smaller than left kidney which could be due to atrophy. 2.  No hydronephrosis. 3.  Incidentally noted small right pleural effusion.      Micro:  Results     ** No results found for the last 168 hours. **          Assessment/Plan:  Left lower extremity cellulitis, complicated. Poor healing  Chronic venous stasis   Afebrile  Resolved leukocytosis  CT LLE on 4/21 with no OM or drainable abscess  s/p I&D on 4/22 down to muscle  S/p repeat I and D on 4/24 of mult compartments and placement of wound VAC: skin at the margins necrotic  S/p repeat I&D and [place] change on 4/27 by Dr. John  Blood culture negative  Wound cultures on 4/22 +GAS and MRSA  Intraoperative wound cx + pseudomonas aeruginosa and GAS  Repeat I&D on 5/4/2020-no skin graft due to necrosis  Continue linezolid and IV zosyn for now  Monitor CBC while on linezolid-platelets stable  Planned graft date change to 5/18/2020  Unclear stop date given continued necrosis-anticipate 7 days from date of skin graft    Nausea, new  No localized abd pain  Check LFTs and lipase    Methamphetamine abuse  Not a candidate for OPIC    Hepatitis C   status post treatment  No additional treatment at this time    LUPE, improved  Avoiding nephrotoxic agents      Prognosis for limb salvage guarded

## 2020-05-12 NOTE — CARE PLAN
Problem: Safety  Goal: Will remain free from injury  Outcome: PROGRESSING AS EXPECTED  Note: Pt up SBA with walker, steady gait noted. Pt calls appropriately. Remains free of falls. Safety precautions maintained.     Problem: Pain Management  Goal: Pain level will decrease to patient's comfort goal  Outcome: PROGRESSING AS EXPECTED  Note: Pain frequently assessed throughout shift. PRN Oxycodone and non pharmacological interventions utilized with relief.

## 2020-05-13 LAB
ANION GAP SERPL CALC-SCNC: 11 MMOL/L (ref 7–16)
BASOPHILS # BLD AUTO: 0.6 % (ref 0–1.8)
BASOPHILS # BLD: 0.03 K/UL (ref 0–0.12)
BUN SERPL-MCNC: 19 MG/DL (ref 8–22)
CALCIUM SERPL-MCNC: 8.1 MG/DL (ref 8.5–10.5)
CHLORIDE SERPL-SCNC: 105 MMOL/L (ref 96–112)
CO2 SERPL-SCNC: 23 MMOL/L (ref 20–33)
CREAT SERPL-MCNC: 1.09 MG/DL (ref 0.5–1.4)
EOSINOPHIL # BLD AUTO: 0.23 K/UL (ref 0–0.51)
EOSINOPHIL NFR BLD: 4.3 % (ref 0–6.9)
ERYTHROCYTE [DISTWIDTH] IN BLOOD BY AUTOMATED COUNT: 50.1 FL (ref 35.9–50)
GLUCOSE SERPL-MCNC: 102 MG/DL (ref 65–99)
HCT VFR BLD AUTO: 22.2 % (ref 42–52)
HGB BLD-MCNC: 7.3 G/DL (ref 14–18)
IMM GRANULOCYTES # BLD AUTO: 0.03 K/UL (ref 0–0.11)
IMM GRANULOCYTES NFR BLD AUTO: 0.6 % (ref 0–0.9)
LYMPHOCYTES # BLD AUTO: 1.03 K/UL (ref 1–4.8)
LYMPHOCYTES NFR BLD: 19.4 % (ref 22–41)
MAGNESIUM SERPL-MCNC: 2 MG/DL (ref 1.5–2.5)
MCH RBC QN AUTO: 31.6 PG (ref 27–33)
MCHC RBC AUTO-ENTMCNC: 32.9 G/DL (ref 33.7–35.3)
MCV RBC AUTO: 96.1 FL (ref 81.4–97.8)
MONOCYTES # BLD AUTO: 0.48 K/UL (ref 0–0.85)
MONOCYTES NFR BLD AUTO: 9.1 % (ref 0–13.4)
NEUTROPHILS # BLD AUTO: 3.5 K/UL (ref 1.82–7.42)
NEUTROPHILS NFR BLD: 66 % (ref 44–72)
NRBC # BLD AUTO: 0.04 K/UL
NRBC BLD-RTO: 0.8 /100 WBC
PHOSPHATE SERPL-MCNC: 2.5 MG/DL (ref 2.5–4.5)
PLATELET # BLD AUTO: 144 K/UL (ref 164–446)
PMV BLD AUTO: 9.1 FL (ref 9–12.9)
POTASSIUM SERPL-SCNC: 4.6 MMOL/L (ref 3.6–5.5)
RBC # BLD AUTO: 2.31 M/UL (ref 4.7–6.1)
SODIUM SERPL-SCNC: 139 MMOL/L (ref 135–145)
WBC # BLD AUTO: 5.3 K/UL (ref 4.8–10.8)

## 2020-05-13 PROCEDURE — 83735 ASSAY OF MAGNESIUM: CPT

## 2020-05-13 PROCEDURE — 700102 HCHG RX REV CODE 250 W/ 637 OVERRIDE(OP): Performed by: NURSE PRACTITIONER

## 2020-05-13 PROCEDURE — 99233 SBSQ HOSP IP/OBS HIGH 50: CPT | Performed by: INTERNAL MEDICINE

## 2020-05-13 PROCEDURE — 700102 HCHG RX REV CODE 250 W/ 637 OVERRIDE(OP): Performed by: INTERNAL MEDICINE

## 2020-05-13 PROCEDURE — A9270 NON-COVERED ITEM OR SERVICE: HCPCS | Performed by: HOSPITALIST

## 2020-05-13 PROCEDURE — 306263 VAC CANNISTER W/GEL 500ML: Performed by: INTERNAL MEDICINE

## 2020-05-13 PROCEDURE — 770021 HCHG ROOM/CARE - ISO PRIVATE

## 2020-05-13 PROCEDURE — 700105 HCHG RX REV CODE 258: Performed by: INTERNAL MEDICINE

## 2020-05-13 PROCEDURE — 97606 NEG PRS WND THER DME>50 SQCM: CPT

## 2020-05-13 PROCEDURE — 80048 BASIC METABOLIC PNL TOTAL CA: CPT

## 2020-05-13 PROCEDURE — 84100 ASSAY OF PHOSPHORUS: CPT

## 2020-05-13 PROCEDURE — 700111 HCHG RX REV CODE 636 W/ 250 OVERRIDE (IP): Performed by: INTERNAL MEDICINE

## 2020-05-13 PROCEDURE — 99232 SBSQ HOSP IP/OBS MODERATE 35: CPT | Performed by: INTERNAL MEDICINE

## 2020-05-13 PROCEDURE — A9270 NON-COVERED ITEM OR SERVICE: HCPCS | Performed by: NURSE PRACTITIONER

## 2020-05-13 PROCEDURE — A9270 NON-COVERED ITEM OR SERVICE: HCPCS | Performed by: FAMILY MEDICINE

## 2020-05-13 PROCEDURE — 700102 HCHG RX REV CODE 250 W/ 637 OVERRIDE(OP): Performed by: HOSPITALIST

## 2020-05-13 PROCEDURE — 700102 HCHG RX REV CODE 250 W/ 637 OVERRIDE(OP): Performed by: FAMILY MEDICINE

## 2020-05-13 PROCEDURE — 700111 HCHG RX REV CODE 636 W/ 250 OVERRIDE (IP): Performed by: FAMILY MEDICINE

## 2020-05-13 PROCEDURE — A9270 NON-COVERED ITEM OR SERVICE: HCPCS | Performed by: INTERNAL MEDICINE

## 2020-05-13 PROCEDURE — 99232 SBSQ HOSP IP/OBS MODERATE 35: CPT | Performed by: NURSE PRACTITIONER

## 2020-05-13 PROCEDURE — 85025 COMPLETE CBC W/AUTO DIFF WBC: CPT

## 2020-05-13 PROCEDURE — 36415 COLL VENOUS BLD VENIPUNCTURE: CPT

## 2020-05-13 RX ADMIN — OXYCODONE 10 MG: 5 TABLET ORAL at 05:16

## 2020-05-13 RX ADMIN — OXYCODONE 10 MG: 5 TABLET ORAL at 11:59

## 2020-05-13 RX ADMIN — OXYCODONE 10 MG: 5 TABLET ORAL at 22:42

## 2020-05-13 RX ADMIN — OXYCODONE 10 MG: 5 TABLET ORAL at 17:40

## 2020-05-13 RX ADMIN — FERROUS GLUCONATE 324 MG: 324 TABLET ORAL at 08:29

## 2020-05-13 RX ADMIN — PROCHLORPERAZINE EDISYLATE 10 MG: 5 INJECTION INTRAMUSCULAR; INTRAVENOUS at 11:59

## 2020-05-13 RX ADMIN — LINEZOLID 600 MG: 600 TABLET, FILM COATED ORAL at 05:04

## 2020-05-13 RX ADMIN — LINEZOLID 600 MG: 600 TABLET, FILM COATED ORAL at 17:40

## 2020-05-13 RX ADMIN — ATORVASTATIN CALCIUM 40 MG: 40 TABLET, FILM COATED ORAL at 05:04

## 2020-05-13 RX ADMIN — SERTRALINE HYDROCHLORIDE 50 MG: 50 TABLET ORAL at 05:04

## 2020-05-13 RX ADMIN — PROMETHAZINE HYDROCHLORIDE 25 MG: 25 TABLET ORAL at 17:40

## 2020-05-13 RX ADMIN — DOCUSATE SODIUM 50 MG AND SENNOSIDES 8.6 MG 2 TABLET: 8.6; 5 TABLET, FILM COATED ORAL at 17:40

## 2020-05-13 RX ADMIN — LOSARTAN POTASSIUM 25 MG: 25 TABLET, FILM COATED ORAL at 05:04

## 2020-05-13 RX ADMIN — ONDANSETRON 4 MG: 2 INJECTION INTRAMUSCULAR; INTRAVENOUS at 09:21

## 2020-05-13 RX ADMIN — PIPERACILLIN AND TAZOBACTAM 4.5 G: 4; .5 INJECTION, POWDER, LYOPHILIZED, FOR SOLUTION INTRAVENOUS; PARENTERAL at 05:04

## 2020-05-13 RX ADMIN — ONDANSETRON 4 MG: 2 INJECTION INTRAMUSCULAR; INTRAVENOUS at 22:42

## 2020-05-13 RX ADMIN — AMLODIPINE BESYLATE 10 MG: 10 TABLET ORAL at 05:04

## 2020-05-13 RX ADMIN — MORPHINE SULFATE 4 MG: 4 INJECTION INTRAVENOUS at 09:23

## 2020-05-13 RX ADMIN — PIPERACILLIN AND TAZOBACTAM 4.5 G: 4; .5 INJECTION, POWDER, LYOPHILIZED, FOR SOLUTION INTRAVENOUS; PARENTERAL at 20:03

## 2020-05-13 RX ADMIN — PIPERACILLIN AND TAZOBACTAM 4.5 G: 4; .5 INJECTION, POWDER, LYOPHILIZED, FOR SOLUTION INTRAVENOUS; PARENTERAL at 11:59

## 2020-05-13 ASSESSMENT — ENCOUNTER SYMPTOMS
BLURRED VISION: 0
FEVER: 0
CHILLS: 0
CONSTIPATION: 0
NERVOUS/ANXIOUS: 0
DIARRHEA: 0
NAUSEA: 1
FALLS: 0
COUGH: 0
PALPITATIONS: 0
SHORTNESS OF BREATH: 0
SINUS PAIN: 0
WEAKNESS: 0
HEMOPTYSIS: 0
HEADACHES: 0
ABDOMINAL PAIN: 0
DEPRESSION: 0
DIZZINESS: 0
SPUTUM PRODUCTION: 0
WHEEZING: 0
SORE THROAT: 0
VOMITING: 0

## 2020-05-13 NOTE — PROGRESS NOTES
LIMB PRESERVATION SERVICE   POST SURGICAL PROGRESS NOTE      HPI:  64 y.o.  with a past medical history that includes hypertension, hepatitis C, kidney disease, polysubstance abuse, nondiabetic Charcot's deformity left foot, and chronic venous stasis ulcers bilateral lower extremities admitted 4/16/2020 for Cellulitis of left lower extremity.        LPS has been consulted for evaluation of left lower leg venous stasis ulcers.  The patient has a history of chronic venous stasis ulcers and has been seen in outpatient wound clinic for treatment as well as infectious disease in October 2019 for an infected skin ulcer.  He reports that his previous venostasis ulcers had resolved approximately 1 month ago and he was discharged from the outpatient wound clinic.  He states that 1 week ago (4/12/2020) he was in the shower and noticed that he developed an ulcer on the anterior aspect of his lower leg distal to his knee and then the ulcer had continued to worsen from there radiating to the back of his left lower leg and development of new ulcers down his left lateral lower leg to his ankle.  He reports having fever, chills, and a large amount of clear drainage from his ulcers.  He states that 4/15/2020 his left lower leg began to swell and developed redness.  He was seen in the outpatient wound center where he was found to have significant swelling and erythema to his left lower extremity, weeping from superficial tissue, and foul-smelling odor.    Patient proceeded to ED.  ESR 97, CRP 31.7.  He was started on vancomycin and Zosyn IV and admitted under hospital services.     SURGERY DATE: 04/22/20  PROCEDURE: with Dr. Israel  1.  Left leg irrigation and debridement, multiple compartments, deep down to   the level of muscle.  2.  Left fasciotomy anterior and posterior compartments.  3.  Left placement of wound VAC.    SURGERY DATE: 04/24/20  PROCEDURE: with Dr. Israel  1.  Left leg irrigation and debridement, multiple  compartments.  2.  Placement of wound VAC.    SURGERY DATE: 04/27/20  PROCEDURE: with Dr. John  1.  Left leg irrigation and debridement, multiple compartments.  2.  Placement of wound VAC.    SURGERY DATE: 05/4/2020 with Dr. Israel and Dr. John  PROCEDURE: Left leg irrigation and debridement.         4/22/2020: Patient denies fevers, chills, nausea, vomiting.  Complains of pain with inspection of lower leg ulcers.  Tract with purulent drainage noted yesterday by wound team.  Repeat CT ordered and was negative for abscess or osteomyelitis.  Patient has been n.p.o. since last night.  Dressings that were changed yesterday are completely saturated today.   Patient also has Charcot foot to left foot and complains of severe pain to foot when walking.  4/28/2020: Patient denies fevers, chills, nausea, vomiting.  Pain well controlled.   LPS was called by bedside RN to assess left LE.  Wound VAC placed last night, clotted off when patient arrived to floor, troubleshoot by night RN, unable to resolve.  NOC RN removed drape (not the foam) and the wound started bleeding.  NOC held pressure to wound bed, and applied pressure dressing.    4/29/2020: Patient's Left LE dressing intact, removed pressure dressing with wound care RN, Oriana Barcenas.  Still open bleeding with light trickle of, used AgNO3 to help stop small bleeds, larger bleeds pressure dressing replaced by wound RNOriana.   5/01/2020: Patient denies fever, chills, n/v.  No complaints of pain.  Dressing removed and wounds assessed.  Wound VAC was placed on Left LE Anterior/Lateral wound.  2 layer compression wrap placed to left LE.  Plan for patient to go to surgery on Monday.   5/5/2020: POD #1 S/P left leg I&D.  Denies any fevers, chills, nausea, vomiting.  Seen with wound team during VAC placement to left lower leg.  5/6/2020: POD #2 S/P left leg I&D.  N.p.o. for possible surgery.  Surgery canceled.  Reschedule for next week.  Wound team to apply veraflo to left  lateral calf ulcer  5/8/2020: POD #4 S/P left leg I&D.  N.p.o. for possible surgery today.  LLE ulcers seen with wound team.  Tendon visible, viable, clean to lateral calf.  Tract proximally has decreased.  Plan for split-thickness skin graft next week.  Continue with veraflo to lateral calf and regular VAC to posterior medial lower leg.  5/11/2020: POD #7 S/P left leg I&D.  Seen with Wound team for veraflo VAC change. Tendon remains visible to lateral calf, tract at 11:00 of 2.5cm.  Postpone STSG until next week.  5/13/2020: POD #9.  Seen during veraflo wound VAC change with wound team.  Tract remains the same at 2.5 cm, tendon visible with minimal granular buds.  Surgery next week with Dr. Israel for possible split-thickness skin graft            PERTINENT LPS RESULTS:   No new pertinent results    SURGICAL SITE EXAM:      /92   Pulse 81   Temp 36.9 °C (98.5 °F) (Temporal)   Resp 18   Ht 1.829 m (6')   Wt 82.2 kg (181 lb 3.5 oz)   SpO2 90%   BMI 24.58 kg/m²     Pedal Pulses: palpable pulses  Sensation: LOPS  Foot warm to touch    Left lateral calf:   100% Red granular tissue except where tendon is visible  Tendon remains visible.  Increased amount of granular buds starting to form around tendon to the distal aspect.  Tract at 11:00  remains approximately the same at 2.5 cm  No odor  No erythema  edema has decreased      Left posterior medial ankle:  Small incision with sutures approximated.  Slightly macerated   Incision surrounded by full-thickness wound with 80% red granular tissue, 20% pale white tissue.  No odor  Edema has decreased  No erythema    Left anterior lower leg ulcer:  Full-thickness 90% red granular tissue 10% pale white tissue  Heavy amount of serous drainage  No odor  Edema has decreased  No erythema        Wound care completed by  with wound team.  See note and flowsheet for further detail.              INFECTION MANAGEMENT:    Results from last 7 days   Lab Units 05/13/20  0114  05/12/20  0340 05/11/20  0113 05/10/20  0300 05/09/20  0308 05/08/20  0107   WBC 1501 K/uL 5.3 5.5 5.8 6.3 7.2 7.5   PLATELET COUNT 1518 K/uL 144* 158* 183 177 215 181     Wound culture results:   Results     ** No results found for the last 168 hours. **               ASSESSMENT/PLAN:   Patient POD # 9 S/P left lower leg I&D.  Patient has had 4 I&D's to this area thus far.  Patient is progressing slowly.  Tract to proximal lateral calf remains the same.   granular tissue noted to all wound beds to left leg.  Tendon to lateral calf remains visible, minimal amount granular buds forming.   Continue with VERAFLO wound VAC to lateral and anterior calf and regular VAC to posterior medial lower leg.  Recommend postpone split-thickness skin graft until tendon is covered          Wound care:   - wound team to manage wound care.   -Khloe with regular VAC to left posterior medial lower leg ulcers   -VERAFLO to left lateral calf and anterior proximal leg,   -2 layer compression wrap to LLE to control edema  -veraflo VAC change and 2 layer compression wrap 3x/wk     Vascular status:   - Palpable pulses    Antibiotics:   - ID following  -continue on zosyn and linezolid    Weight Bearing Status:   -Weight bearing as tolerated    Offloading:   -offload with pillows    PT/OT :   -involved,       Plan to return to O.R.:   STSG surgery postponed this week as tendon remains visible and 2.5 cm tract remains to lateral calf.    Recommend return to surgery once tendon is covered and tract has resolved.  Discussed with Dr. Israel      DISCHARGE PLAN:    Disposition: recommend SNF or LTAC    Follow-up: pending      Discussed with: pt, RN, Dr. Israel, , Dr. Burnett, Abby Valdes RN wound team,         Abeba Hernández, A.P.R.NIvone    If any questions or concerns, please call [phone]

## 2020-05-13 NOTE — CARE PLAN
Problem: Communication  Goal: The ability to communicate needs accurately and effectively will improve  Outcome: PROGRESSING AS EXPECTED     Problem: Safety  Goal: Will remain free from injury  Outcome: PROGRESSING AS EXPECTED  Goal: Will remain free from falls  Outcome: PROGRESSING AS EXPECTED     Problem: Infection  Goal: Will remain free from infection  Outcome: PROGRESSING AS EXPECTED     Problem: Venous Thromboembolism (VTW)/Deep Vein Thrombosis (DVT) Prevention:  Goal: Patient will participate in Venous Thrombosis (VTE)/Deep Vein Thrombosis (DVT)Prevention Measures  Outcome: PROGRESSING AS EXPECTED     Problem: Bowel/Gastric:  Goal: Normal bowel function is maintained or improved  Outcome: PROGRESSING AS EXPECTED  Goal: Will not experience complications related to bowel motility  Outcome: PROGRESSING AS EXPECTED     Problem: Knowledge Deficit  Goal: Knowledge of disease process/condition, treatment plan, diagnostic tests, and medications will improve  Outcome: PROGRESSING AS EXPECTED  Goal: Knowledge of the prescribed therapeutic regimen will improve  Outcome: PROGRESSING AS EXPECTED     Problem: Discharge Barriers/Planning  Goal: Patient's continuum of care needs will be met  Outcome: PROGRESSING AS EXPECTED     Problem: Pain Management  Goal: Pain level will decrease to patient's comfort goal  Outcome: PROGRESSING AS EXPECTED     Problem: Skin Integrity  Goal: Risk for impaired skin integrity will decrease  Outcome: PROGRESSING AS EXPECTED     Problem: Mobility  Goal: Risk for activity intolerance will decrease  Outcome: PROGRESSING AS EXPECTED     Problem: Respiratory:  Goal: Respiratory status will improve  Outcome: PROGRESSING AS EXPECTED

## 2020-05-13 NOTE — PROGRESS NOTES
[facility] Medicine Daily Progress Note    Date of Service  5/13/2020    Chief Complaint  64 y.o. male admitted 4/16/2020 with left leg redness and swelling    Hospital Course    Mr. Goran Chavez is a 64 y.o. male with past medical history significant for venous stasis dermatitis and stasis ulcers of bilateral lower extremities who presented on 4/16/2020 with left leg redness and swelling.  Patient was previously followed by wound care as an outpatient but was discharged when ulcers had resolved, but ulcers came back 1 week prior to presentation.  Patient has had multiple incision and drainages with a wound VAC in place.  Intraoperative wound culture grew Pseudomonas and group A strep.  Current plans for skin graft.        Interval Problem Update  Patient was seen and examined at bedside.  I have personally reviewed vitals, labs, and imaging.    5/12.  Patient has been hypertensive.  Patient denies fever, chills, chest pain, shortness of breath.  Patient does report nausea but is able to keep food down and has good p.o. intake.  5/13.  Blood pressure improved after started on losartan addition to lidocaine.  Afebrile.  Patient with pain in left lower extremity is well controlled.  Denies fever, chills, chest pain, shortness of breath.  After evaluation by wound care tenderness still visible and they would like more granulation tissue prior to skin graft.  Skin graft has again been postponed until 5/18.  Wound VAC was changed today.  Does complain of nausea but is adequate p.o. intake and keeping food down.  Platelets slightly trended down to 144 we will monitor    Consultants/Specialty  Infectious disease  LPS  Ortho    Code Status  Full    Disposition  Pending medical and surgical clearance    Review of Systems  Review of Systems   Constitutional: Negative for chills and fever.   HENT: Negative for congestion, sinus pain and sore throat.    Eyes: Negative for blurred vision.   Respiratory: Negative for  cough, hemoptysis, sputum production, shortness of breath and wheezing.    Cardiovascular: Negative for chest pain, palpitations and leg swelling.   Gastrointestinal: Positive for nausea. Negative for abdominal pain, constipation, diarrhea and vomiting.   Genitourinary: Negative for dysuria, frequency and urgency.   Musculoskeletal: Negative for falls.   Skin: Positive for rash (Left lower extremity).   Neurological: Negative for dizziness, weakness and headaches.   Psychiatric/Behavioral: Negative for depression. The patient is not nervous/anxious.    All other systems reviewed and are negative.       Physical Exam  Temp:  [36.5 °C (97.7 °F)-37.3 °C (99.2 °F)] 36.5 °C (97.7 °F)  Pulse:  [75-82] 77  Resp:  [17-18] 18  BP: (119-143)/(62-92) 143/78  SpO2:  [89 %-94 %] 89 %    Physical Exam  Vitals signs and nursing note reviewed.   Constitutional:       General: He is not in acute distress.     Appearance: Normal appearance. He is normal weight.   HENT:      Head: Normocephalic and atraumatic.      Nose: Nose normal.      Mouth/Throat:      Mouth: Mucous membranes are moist.      Pharynx: Oropharynx is clear.   Eyes:      Extraocular Movements: Extraocular movements intact.      Conjunctiva/sclera: Conjunctivae normal.   Neck:      Musculoskeletal: Normal range of motion and neck supple.   Cardiovascular:      Rate and Rhythm: Normal rate and regular rhythm.      Pulses: Normal pulses.      Heart sounds: Normal heart sounds. No murmur. No friction rub. No gallop.    Pulmonary:      Effort: Pulmonary effort is normal. No respiratory distress.      Breath sounds: Normal breath sounds. No wheezing or rales.   Chest:      Chest wall: No tenderness.   Abdominal:      General: Abdomen is flat. Bowel sounds are normal. There is no distension.      Palpations: Abdomen is soft. There is no mass.      Tenderness: There is no abdominal tenderness. There is no guarding.   Musculoskeletal: Normal range of motion.      Comments:  Wound VAC on left lower extremity.   Skin:     General: Skin is warm.      Capillary Refill: Capillary refill takes less than 2 seconds.   Neurological:      General: No focal deficit present.      Mental Status: He is alert and oriented to person, place, and time. Mental status is at baseline.      Cranial Nerves: No cranial nerve deficit.      Motor: No weakness.   Psychiatric:         Mood and Affect: Mood normal.         Behavior: Behavior normal.         Thought Content: Thought content normal.         Judgment: Judgment normal.         Fluids    Intake/Output Summary (Last 24 hours) at 5/13/2020 1522  Last data filed at 5/13/2020 0829  Gross per 24 hour   Intake 200 ml   Output 650 ml   Net -450 ml       Laboratory  Recent Labs     05/11/20  0113 05/12/20  0340 05/13/20  0114   WBC 5.8 5.5 5.3   RBC 2.49* 2.34* 2.31*   HEMOGLOBIN 7.7* 7.4* 7.3*   HEMATOCRIT 23.7* 22.6* 22.2*   MCV 95.2 96.6 96.1   MCH 30.9 31.6 31.6   MCHC 32.5* 32.7* 32.9*   RDW 49.0 50.6* 50.1*   PLATELETCT 183 158* 144*   MPV 8.6* 8.8* 9.1     Recent Labs     05/11/20  0113 05/13/20  0114   SODIUM 135 139   POTASSIUM 4.2 4.6   CHLORIDE 103 105   CO2 21 23   GLUCOSE 100* 102*   BUN 15 19   CREATININE 0.94 1.09   CALCIUM 8.3* 8.1*                   Imaging  IR-US GUIDED PIV   Final Result    Ultrasound-guided PERIPHERAL IV INSERTION performed by    qualified nursing staff as above.            CT-EXTREMITY, LOWER WITH LEFT   Final Result         1. No evidence of osteomyelitis.   2. Cellulitis and soft tissue organs of the left lower leg. Subcutaneous air without drainable abscess.   3. Suspected pes planovalgus deformity. Multifocal osteoarthrosis of the midfoot.      US-RENAL   Final Result      1.  Right kidney appears smaller than left kidney which could be due to atrophy.      2.  No hydronephrosis.      3.  Incidentally noted small right pleural effusion.      CT-EXTREMITY, LOWER WITH LEFT   Final Result      1.  Extensive subcutaneous  edema with overlying skin thickening. Inflammation extends along the fascial planes with curvilinear fluid superficial to the gastrocnemius muscle. No soft tissue gas to suggest necrotizing fasciitis.      2.  Osteopenia           Assessment/Plan  * Venous stasis ulcer of left lower extremity (HCC)- (present on admission)  Assessment & Plan  With surrounding cellulitis improving. Arterial US negative for arterial insufficiency.  - s/p washout on 4/22, 4/24, 4/27 and 5/4  - ID and orthopedic surgery following, appreciate recs  - continue with linezolid and Zosyn, per ID recs  - abx to be continued x7 days post skin graft  - continue with wound care; per LPS, tendon still visible so recommending surgery for skin graft be postponed now until 5/18    Hyperkalemia  Assessment & Plan  Resolved.  Continue to monitor    Venous stasis dermatitis of both lower extremities- (present on admission)  Assessment & Plan  Chronic venous stasis of bilateral LE.   - management as above    CKD (chronic kidney disease) stage 3, GFR 30-59 ml/min ([facility])- (present on admission)  Assessment & Plan  Acute on chronic resolved. Cr now normalized.   - avoid nephrotoxic agents; renally dose medications  Monitor kidney function and urine output    Essential hypertension- (present on admission)  Assessment & Plan  Intermittently hypertensive.   - continue home amlodipine.  Started on losartan during admission  - PRN hydralazine    Constipation  Assessment & Plan  Improving.   - continue bowel regimen    Normocytic anemia- (present on admission)  Assessment & Plan  Likely anemia of chronic disease and in the setting of slow blood loss from his LE wound, which has now improved.   No active signs of bleeding.    Patient is hemodynamically stable and asymptomatic  Will continue to trend with CBC  s/p 1U PRBC on 4/29 and 5/5  Continue iron supplementation  Transfuse to maintain hemoglobin greater than 7.  Results from last 7 days   Lab Units  05/13/20  0114 05/12/20  0340 05/11/20  0113 05/10/20  0300   HGB 1503 g/dL 7.3* 7.4* 7.7* 7.3*   HCT 1504 % 22.2* 22.6* 23.7* 22.9*   MCV 1505 fL 96.1 96.6 95.2 97.0     Folate -Folic Acid   Date Value Ref Range Status   12/28/2019 19.6 >4.0 ng/mL Final     Vitamin B12 -True Cobalamin   Date Value Ref Range Status   04/22/2020 1374 (H) 211 - 911 pg/mL Final     Reticulocyte Count   Date Value Ref Range Status   12/28/2019 2.1 0.8 - 2.1 % Final     Retic, Absolute   Date Value Ref Range Status   12/28/2019 0.08 (H) 0.04 - 0.06 M/uL Final     Imm. Reticulocyte Fraction   Date Value Ref Range Status   12/28/2019 24.4 (H) 9.3 - 17.4 % Final     Retic Hgb Equivalent   Date Value Ref Range Status   12/28/2019 29.2 29.0 - 35.0 pg/cell Final      Ferritin   Date Value Ref Range Status   12/28/2019 77.7 22.0 - 322.0 ng/mL Final     Iron   Date Value Ref Range Status   04/22/2020 27 (L) 50 - 180 ug/dL Final     Total Iron Binding   Date Value Ref Range Status   04/22/2020 131 (L) 250 - 450 ug/dL Final     % Saturation   Date Value Ref Range Status   04/22/2020 21 15 - 55 % Final         Methamphetamine abuse (HCC)- (present on admission)  Assessment & Plan  Positive UDS for amphetamine, oxycodone and marijuana.  - continued counseling and education  Not candidate for outpatient infusion for antibiotics    Alcohol abuse- (present on admission)  Assessment & Plan  - out of withdrawal window  - continued counseling and education  - correct electrolytes PRN    Anxiety- (present on admission)  Assessment & Plan  Stable.   - continue home sertraline  Monitor closely for serotonin syndrome as patient is also on linezolid    Gastrointestinal prophylaxis: The patient has no risk factors for developing gastric ulcers or gastritis.  As the patient is tolerating oral intake, GI prophylaxis is not indicated at this time.  Antibiotics: IV Zosyn, linezolid  Diet Order Regular  Code status: Full  Prognosis: Guarded  Risk: The Patient is at  HIGH risk for complications and decompensation secondary to his multiple cormorbidities including left lower extremity cellulitis complicated by poor healing from venous stasis dermatitis and venous stasis ulcers requiring IV antibiotics, multiple incision and drainage with washout, and wound VAC.  Hypertension, chronic kidney disease, anemia    I have personally reviewed notes, labs, vitals, imaging.  I discussed the plan of care with bedside RN as well as on multidisciplinary rounds     I have performed a physical exam and reviewed and updated ROS and Plan today (5/13/2020). In review of yesterday's note (5/12/2020), there are no changes except as documented above.       VTE prophylaxis: SCD on right leg.  Avoid anticoagulation in the setting of anemia requiring transfusions

## 2020-05-13 NOTE — CARE PLAN
Problem: Safety  Goal: Will remain free from injury  Outcome: PROGRESSING AS EXPECTED  Note: Pt up with SBA and walker, steady gait noted. High fall risk per Childs Reyes scale. Pt remains free of falls. Safety precautions maintained.     Problem: Pain Management  Goal: Pain level will decrease to patient's comfort goal  Outcome: PROGRESSING AS EXPECTED  Note: Pain frequently assessed throughout shift. PRN Oxycodone provided with relief. Non pharmacological interventions utilized.

## 2020-05-13 NOTE — WOUND TEAM
[facility]  Inpatient Services  Wound and Skin Care Follow Up    Admission Date: 4/16/2020     Last order of IP CONSULT TO WOUND CARE was found on 4/24/2020 from Hospital Encounter on 4/16/2020     HPI, PMH, SH: Reviewed    Unit where seen by Wound Team: [id number]     WOUND CONSULT/FOLLOW UP RELATED TO:  LLE Vac change     Self Report / Pain Level:  Pre-medicated with PO and IV medication.        OBJECTIVE:  Tubi  in place, vac dressings in place and sponge compressed.    WOUND TYPE, LOCATION, CHARACTERISTICS (Pressure Injuries: location, stage, POA or date identified)        Negative Pressure Wound Therapy 05/05/20 Surgical (Active)   NPWT Pump Mode / Pressure Setting Ulta;Continuous;125 mmHg 05/13/20 0900   Dressing Type Black Foam (Veraflo);Black Foam (Regular);Medium 05/13/20 0900   Number of Foam Pieces Used 5 05/13/20 0900   Canister Changed No 05/13/20 0900   Output (mL) 450 mL 05/12/20 1245   NEXT Dressing Change/Treatment Date 05/15/20 05/13/20 0900   VAC VeraFlo Irrigant Normal Saline 05/13/20 0900   VAC VeraFlo Soak Time (mins) 6 05/13/20 0900   VAC VeraFlo Instill Volume (ml) 20 05/13/20 0900   VAC VeraFlo - Therapy Time (hrs) 2 05/13/20 0900   VAC VeraFlo Pressure (mm/Hg) Continuous;125 mmHg 05/13/20 0900   WOUND NURSE ONLY - Time Spent with Patient (mins) 90 05/06/20 1600       Wound 05/05/20 Full Thickness Wound Leg Lateral;Lower;Posterior;Medial Left open complicated surgical (Active)   Wound Image     05/13/20 0900   Site Assessment Red;White;Yellow 05/13/20 0900   Periwound Assessment Clean;Dry;Intact 05/13/20 0900   Margins Attached edges;Defined edges 05/13/20 0900   Closure Secondary intention 05/13/20 0900   Drainage Amount Large 05/13/20 0900   Drainage Description Serous;Serosanguineous 05/13/20 0900   Treatments Cleansed;Irrigation;Site care 05/13/20 0900   Wound Cleansing Approved Wound Cleanser 05/13/20 0900   Periwound Protectant Benzoin;Paste Ring;Drape;Skin Protectant  Wipes to Periwound 05/13/20 0900   Dressing Cleansing/Solutions Normal Saline 05/13/20 0900   Dressing Options Wound Vac;Compression Wrap Two Layer 05/13/20 0900   Dressing Changed Changed 05/13/20 0900   Dressing Status Clean;Dry;Intact 05/13/20 0900   Dressing Change/Treatment Frequency Monday, Wednesday, Friday, and As Needed 05/13/20 0900   NEXT Dressing Change/Treatment Date 05/15/20 05/13/20 0900   NEXT Weekly Photo (Inpatient Only) 05/20/20 05/13/20 0900   Non-staged Wound Description Full thickness 05/13/20 0900   Wound Length (cm) 20 cm 05/13/20 0900   Wound Width (cm) 15 cm 05/13/20 0900   Wound Depth (cm) 0.5 cm 05/13/20 0900   Wound Surface Area (cm^2) 300 cm^2 05/13/20 0900   Wound Volume (cm^3) 150 cm^3 05/13/20 0900   Wound Healing % 28 05/13/20 0900   Tunneling (cm) 2.5 cm 05/13/20 0900   Tunneling Clock Position of Wound 11 05/13/20 0900   Shape irregular 05/11/20 0930   Wound Odor None 05/13/20 0900   Pulses Left;1+;DP;PT 05/13/20 0900   Exposed Structures Tendon;Muscle 05/13/20 0900   WOUND NURSE ONLY - Time Spent with Patient (mins) 140 05/11/20 0930          Vascular:    BHUPINDER:   No results found.         Culture:   Obtained,   Culture Results show:  Recent Results (from the past 720 hour(s))   CULTURE WOUND W/ GRAM STAIN    Collection Time: 04/22/20 12:03 PM    Specimen: Wound   Result Value Ref Range    Significant Indicator POS (POS)     Source WND     Site Left Leg     Culture Result - (A)     Gram Stain Result Moderate WBCs.  No organisms seen.       Culture Result (A)      Pseudomonas aeruginosa  Light growth  P.aeruginosa may develop resistance during prolonged therapy  with all antibiotics. Isolates that are initially susceptible  may become resistant within three to four days after  initiation of therapy. Testing of repeat isolates may be  warranted.      Culture Result (A)      Beta Hemolytic Streptococcus group A  Rare growth         Susceptibility    Pseudomonas aeruginosa - SHANON      Ceftazidime <=1 Sensitive mcg/mL     Ciprofloxacin >2 Resistant mcg/mL     Cefepime 4 Sensitive mcg/mL     Amikacin <=16 Sensitive mcg/mL     Gentamicin <=4 Sensitive mcg/mL     Tobramycin <=4 Sensitive mcg/mL     Meropenem <=1 Sensitive mcg/mL     Pip/Tazobactam 64 Sensitive mcg/mL         INTERVENTIONS BY WOUND TEAM:  Dressing removed with Miguel ANGULO. Sandrine wound prepped with benzoin and paste ring. Veraflo sponge applied to wound bed and secured with drape, trac pad applied. Veraflo therapy initiated, instilling 20ml of NS soaking for 6 minutes every 2 hours. Medial-posterior wound vac removed and cleansed with wound cleanser. Paste ring to sandrine wound, regular vac sponge to wound bed. Secured with drape. Trac pad applied to lateral side and seal obtained and therapy resumed at 125mmHg Y connected. L leg then wrapped in 2 layer compression.     Interdisciplinary consultation: Patient, Bedside RN, MINERVA ANGULO    EVALUATION: Patient admitted with LLE redness and swelling, venous ulcers present, developed an abcess and gas trapped underneath surface, multiple I&D's and Necrotic tissue developed. Had to be excised on the lateral leg down to the fascia. LLE now presents with Lateral surgical wound and venous ulcers to the anterior leg/medial/posterior ankle. Patient to go for STSG possible in a week or two depending on presentation of wound bed. Venous ulcers will benefit from NPWT to manage drainage, assist in debridement, and properly heal the wound beds. Will continue regular NPWT on venous ulcer sites. Lateral incisional wound veraflow NPWT to maintain a moist wound bed, manage drainage, encourage granulation, and keep the wound clean.     Goals: Steady decrease in wound area and depth weekly.    NURSING PLAN OF CARE ORDERS (X):    Dressing changes: See Dressing Care orders: X  Skin care: See Skin Care orders: X  Rectal tube care: See Rectal Tube Care orders:   Other orders:    WOUND TEAM PLAN OF  CARE:   Dressing changes by wound team:          Follow up 1-2 times weekly:               Follow up 3 times weekly:                NPWT change 3 times weekly:   X  Follow up as needed:       Other (explain):     Anticipated discharge plans:   LTACH:    X    SNF/Rehab:   X               Home Care:           Outpatient Wound Center:            Self Care:

## 2020-05-13 NOTE — PROGRESS NOTES
Infectious Disease Progress Note    Author: Mayte Fuller M.D. Date & Time of service: 2020  11:56 AM    Chief Complaint:  Follow-up for necrotizing LLE infection/cellulitis    Interval History:  64-year-old male with methamphetamine abuse, hepatitis C status post treatment, bilateral lower extremity venous stasis dermatitis with ulcerations, admitted 2020 with left leg cellulitis and fever   afebrile, white count 10.2, tolerating Zyvox and Zosyn.  Reports feeling better overall.   afebrile WBC 8.3 plan for repeat I&D today, denies pain, no new clinical issues   AF WBC 14.3 S/p repeat I&D yesterday. Wound vac removed given excessive bloody drainage. Rate pain 6-7/10 in severity.   AF WBC 7.5 had blood transfusion over night and feeling better. No new complaints. LLE dressed without any bloody drainage   AF WBC 7.3 LLE dressed-pain controlled States plan for skin graft possibly Mon. No new complaints   AF WBC 8 no new complaints-tolerated VAC change yesterday. Denies SE abx  5/3 AF no new complaints-plan for OR tomorrow   AF WBC 8.2-plan for OR still today-no time yet. No new complaints   AF WBC 8.3 seen with wound care-skin graft deferred due to continued necrosis and purulence ant tibial wound -possible placement tomorrow-denies SE abx-posterior calf wound healing well. Eating well   AF LPS note appreciated-surgery changed to -change VAC to Veraflo. Continues to tolerate abx well. No new complaints. Pain controlled   AF WBC 5.8 platelets 183 Pain controlled. Tolerating abx. No new complaints   AF WBC 5.5 graft delayed due to continued undermining c/o nausea today-no specific abd pain-has generlized discomfort. No emesis or diarrhea   AF sleeping soundly at time of rounds-no acute events  Labs Reviewed and Medications Reviewed.    Review of Systems:  Review of Systems   Unable to perform ROS: Other       Hemodynamics:  Temp (24hrs), Av °C (98.6 °F),  Min:36.5 °C (97.7 °F), Max:37.3 °C (99.2 °F)  Temperature: 36.5 °C (97.7 °F)  Pulse  Av.1  Min: 54  Max: 127   Blood Pressure: 143/78       Physical Exam:  Physical Exam  Vitals signs and nursing note reviewed.   Constitutional:       General: He is not in acute distress.     Appearance: He is not ill-appearing, toxic-appearing or diaphoretic.      Comments: disheveled   HENT:      Nose: No rhinorrhea.      Mouth/Throat:      Comments: Poor dentition  Eyes:      General:         Right eye: No discharge.         Left eye: No discharge.   Cardiovascular:      Rate and Rhythm: Normal rate.   Pulmonary:      Effort: No respiratory distress.      Breath sounds: Normal breath sounds. No stridor. No wheezing or rhonchi.   Abdominal:      General: There is no distension.      Palpations: Abdomen is soft.      Tenderness: There is no rebound.   Musculoskeletal:         General: Deformity present.      Comments: Left lower extremity dressed/VAC  Wound pictures reviewed  Wound 23 cm X 15 X 0.6 cm with necrosis   Skin:     General: Skin is warm.      Coloration: Skin is not jaundiced.      Findings: No rash.   Neurological:      Comments: sleeping         Meds:    Current Facility-Administered Medications:   •  losartan  •  linezolid  •  [COMPLETED] piperacillin-tazobactam **AND** piperacillin-tazobactam  •  silver nitrate  •  amLODIPine  •  diphenhydrAMINE  •  senna-docusate  •  sertraline  •  polyethylene glycol/lytes  •  magnesium hydroxide  •  atorvastatin  •  ferrous gluconate  •  acetaminophen  •  ondansetron  •  ondansetron  •  promethazine  •  promethazine  •  prochlorperazine  •  hydrALAZINE  •  oxyCODONE immediate-release  •  morphine injection    Labs:  Recent Labs     20  0113 20  0340 20  0114   WBC 5.8 5.5 5.3   RBC 2.49* 2.34* 2.31*   HEMOGLOBIN 7.7* 7.4* 7.3*   HEMATOCRIT 23.7* 22.6* 22.2*   MCV 95.2 96.6 96.1   MCH 30.9 31.6 31.6   RDW 49.0 50.6* 50.1*   PLATELETCT 183 158* 144*   MPV  8.6* 8.8* 9.1   NEUTSPOLYS 69.50 69.80 66.00   LYMPHOCYTES 15.80* 15.20* 19.40*   MONOCYTES 8.90 10.10 9.10   EOSINOPHILS 4.80 4.00 4.30   BASOPHILS 0.50 0.70 0.60     Recent Labs     05/11/20  0113 05/13/20  0114   SODIUM 135 139   POTASSIUM 4.2 4.6   CHLORIDE 103 105   CO2 21 23   GLUCOSE 100* 102*   BUN 15 19     Recent Labs     05/11/20 0113 05/12/20 1927 05/13/20  0114   ALBUMIN  --  3.0*  --    TBILIRUBIN  --  0.4  --    ALKPHOSPHAT  --  82  --    TOTPROTEIN  --  6.2  --    ALTSGPT  --  9  --    ASTSGOT  --  11*  --    CREATININE 0.94  --  1.09       Imaging:  Ct-extremity, Lower With Left    Result Date: 4/21/2020 4/21/2020 5:16 PM HISTORY/REASON FOR EXAM:  Lower leg pain, suspected osteomyelitis. TECHNIQUE/EXAM DESCRIPTION AND NUMBER OF VIEWS:  CT scan of the LEFT lower extremity with contrast, with reconstructions. Thin helical 3 mm sections were obtained from the distal femur through the proximal tibia/fibula. Sagittal and coronal multiplanar reconstructions were generated from the axial images. A total of 100 mL of Omnipaque 350 nonionic contrast was administered  IV without complication. Up to date radiation dose reduction adjustments have been utilized to meet ALARA standards for radiation dose reduction. COMPARISON: 4/16/2020. FINDINGS: No acute fracture or dislocation is identified. Cellulitis of the lower leg, with soft tissue prominence and subcutaneous air. No subjacent cortical bone destruction to suggest osteomyelitis. No drainable abscess. There is chronic appearing valgus deviation of the foot at the subtalar joint, and there is suspected pes planovalgus. Moderate subtalar osteoarthrosis. Multifocal mild to moderate osteoarthrosis in the midfoot. Mild multifocal interphalangeal osteoarthrosis. Reactive popliteal lymphadenopathy.     1. No evidence of osteomyelitis. 2. Cellulitis and soft tissue organs of the left lower leg. Subcutaneous air without drainable abscess. 3. Suspected pes  planovalgus deformity. Multifocal osteoarthrosis of the midfoot.    Ct-extremity, Lower With Left    Result Date: 4/16/2020 4/16/2020 1:45 PM HISTORY/REASON FOR EXAM:  Lower leg swelling/redness, cellulitis suspected. Left lower extremity edema and redness TECHNIQUE/EXAM DESCRIPTION AND NUMBER OF VIEWS:  CT scan of the LEFT lower extremity with contrast, with reconstructions. Thin helical 3 mm sections were obtained from the distal femur through the proximal tibia/fibula. Sagittal and coronal multiplanar reconstructions were generated from the axial images. A total of 100 mL of Omnipaque 350 nonionic contrast was administered  IV without complication. Up to date radiation dose reduction adjustments have been utilized to meet ALARA standards for radiation dose reduction. COMPARISON: 12/26/2019 FINDINGS: There is diffuse subcutaneous edema in the lower leg. The overlying skin is markedly thickened. There is curvilinear fluid deep to the fascia, but superficial to the gastrocnemius muscle extending from the knee joint to the ankle. Edema extends into the deeper fascial planes. No soft tissue gas is identified. No definite muscle necrosis is identified. No thrombus is identified. The Achilles tendon is intact. The bones are osteopenic. No cortical destruction is identified.     1.  Extensive subcutaneous edema with overlying skin thickening. Inflammation extends along the fascial planes with curvilinear fluid superficial to the gastrocnemius muscle. No soft tissue gas to suggest necrotizing fasciitis. 2.  Osteopenia    Us-renal    Result Date: 4/19/2020 4/19/2020 10:53 AM HISTORY/REASON FOR EXAM:  Acute renal failure TECHNIQUE/EXAM DESCRIPTION: Renal ultrasound. COMPARISON:  08/15/2018 FINDINGS: The right kidney measures 8.11 cm. The left kidney measures 9.17 cm. There is no hydronephrosis. There are no abnormal calcifications. Small right pleural effusion is identified. The bladder demonstrates no focal wall  abnormality.     1.  Right kidney appears smaller than left kidney which could be due to atrophy. 2.  No hydronephrosis. 3.  Incidentally noted small right pleural effusion.      Micro:  Results     ** No results found for the last 168 hours. **          Assessment/Plan:  Left lower extremity cellulitis, complicated. Poor healing. Additional work  Chronic venous stasis   Afebrile  Resolved leukocytosis  CT LLE on 4/21 with no OM or drainable abscess  s/p I&D on 4/22 down to muscle  S/p repeat I and D on 4/24 of mult compartments and placement of wound VAC: skin at the margins necrotic  S/p repeat I&D and [place] change on 4/27 by Dr. John  Blood culture negative  Wound cultures on 4/22 +GAS and MRSA  Intraoperative wound cx + pseudomonas aeruginosa and GAS  Repeat I&D on 5/4/2020-no skin graft due to necrosis  Continue linezolid and IV zosyn for now  Monitor CBC while on linezolid-platelets stable  Planned graft date change to 5/18/2020  Unclear stop date given continued necrosis-anticipate 7 days from date of skin graft    Nausea  No localized abd pain  Normal LFTs and lipase    Methamphetamine abuse  Not a candidate for OPIC    Hepatitis C   status post treatment  No additional treatment at this time    LUPE, improved  Avoiding nephrotoxic agents      Prognosis for limb salvage guarded

## 2020-05-13 NOTE — PROGRESS NOTES
Assumed cares at 0700. Pt sleeping during bedside shift report, no signs of distress. PIV intact. Pt on RA. Wound vac c/d/i, running at 125 mmHg. Bed in lowest locked position, call light in reach. Safety precautions maintained.

## 2020-05-14 LAB
BASOPHILS # BLD AUTO: 0.5 % (ref 0–1.8)
BASOPHILS # BLD: 0.03 K/UL (ref 0–0.12)
EOSINOPHIL # BLD AUTO: 0.19 K/UL (ref 0–0.51)
EOSINOPHIL NFR BLD: 3.1 % (ref 0–6.9)
ERYTHROCYTE [DISTWIDTH] IN BLOOD BY AUTOMATED COUNT: 50.8 FL (ref 35.9–50)
ERYTHROCYTE [SEDIMENTATION RATE] IN BLOOD BY WESTERGREN METHOD: 80 MM/HOUR (ref 0–20)
HCT VFR BLD AUTO: 22 % (ref 42–52)
HGB BLD-MCNC: 7 G/DL (ref 14–18)
IMM GRANULOCYTES # BLD AUTO: 0.04 K/UL (ref 0–0.11)
IMM GRANULOCYTES NFR BLD AUTO: 0.6 % (ref 0–0.9)
LYMPHOCYTES # BLD AUTO: 1.15 K/UL (ref 1–4.8)
LYMPHOCYTES NFR BLD: 18.7 % (ref 22–41)
MCH RBC QN AUTO: 31 PG (ref 27–33)
MCHC RBC AUTO-ENTMCNC: 31.8 G/DL (ref 33.7–35.3)
MCV RBC AUTO: 97.3 FL (ref 81.4–97.8)
MONOCYTES # BLD AUTO: 0.53 K/UL (ref 0–0.85)
MONOCYTES NFR BLD AUTO: 8.6 % (ref 0–13.4)
NEUTROPHILS # BLD AUTO: 4.22 K/UL (ref 1.82–7.42)
NEUTROPHILS NFR BLD: 68.5 % (ref 44–72)
NRBC # BLD AUTO: 0.03 K/UL
NRBC BLD-RTO: 0.5 /100 WBC
PLATELET # BLD AUTO: 143 K/UL (ref 164–446)
PMV BLD AUTO: 8.9 FL (ref 9–12.9)
RBC # BLD AUTO: 2.26 M/UL (ref 4.7–6.1)
WBC # BLD AUTO: 6.2 K/UL (ref 4.8–10.8)

## 2020-05-14 PROCEDURE — 36415 COLL VENOUS BLD VENIPUNCTURE: CPT

## 2020-05-14 PROCEDURE — 99233 SBSQ HOSP IP/OBS HIGH 50: CPT | Performed by: INTERNAL MEDICINE

## 2020-05-14 PROCEDURE — 700111 HCHG RX REV CODE 636 W/ 250 OVERRIDE (IP): Performed by: INTERNAL MEDICINE

## 2020-05-14 PROCEDURE — 85652 RBC SED RATE AUTOMATED: CPT

## 2020-05-14 PROCEDURE — 700102 HCHG RX REV CODE 250 W/ 637 OVERRIDE(OP): Performed by: NURSE PRACTITIONER

## 2020-05-14 PROCEDURE — 700102 HCHG RX REV CODE 250 W/ 637 OVERRIDE(OP): Performed by: HOSPITALIST

## 2020-05-14 PROCEDURE — 700102 HCHG RX REV CODE 250 W/ 637 OVERRIDE(OP): Performed by: FAMILY MEDICINE

## 2020-05-14 PROCEDURE — 85025 COMPLETE CBC W/AUTO DIFF WBC: CPT

## 2020-05-14 PROCEDURE — A9270 NON-COVERED ITEM OR SERVICE: HCPCS | Performed by: FAMILY MEDICINE

## 2020-05-14 PROCEDURE — 700102 HCHG RX REV CODE 250 W/ 637 OVERRIDE(OP): Performed by: INTERNAL MEDICINE

## 2020-05-14 PROCEDURE — A9270 NON-COVERED ITEM OR SERVICE: HCPCS | Performed by: NURSE PRACTITIONER

## 2020-05-14 PROCEDURE — A9270 NON-COVERED ITEM OR SERVICE: HCPCS | Performed by: INTERNAL MEDICINE

## 2020-05-14 PROCEDURE — A9270 NON-COVERED ITEM OR SERVICE: HCPCS | Performed by: HOSPITALIST

## 2020-05-14 PROCEDURE — 306263 VAC CANNISTER W/GEL 500ML: Performed by: INTERNAL MEDICINE

## 2020-05-14 PROCEDURE — 700105 HCHG RX REV CODE 258: Performed by: INTERNAL MEDICINE

## 2020-05-14 PROCEDURE — 770021 HCHG ROOM/CARE - ISO PRIVATE

## 2020-05-14 PROCEDURE — 99232 SBSQ HOSP IP/OBS MODERATE 35: CPT | Performed by: INTERNAL MEDICINE

## 2020-05-14 PROCEDURE — 700111 HCHG RX REV CODE 636 W/ 250 OVERRIDE (IP): Performed by: FAMILY MEDICINE

## 2020-05-14 RX ADMIN — FERROUS GLUCONATE 324 MG: 324 TABLET ORAL at 09:28

## 2020-05-14 RX ADMIN — ONDANSETRON 4 MG: 4 TABLET, ORALLY DISINTEGRATING ORAL at 13:39

## 2020-05-14 RX ADMIN — ATORVASTATIN CALCIUM 40 MG: 40 TABLET, FILM COATED ORAL at 05:23

## 2020-05-14 RX ADMIN — PIPERACILLIN AND TAZOBACTAM 4.5 G: 4; .5 INJECTION, POWDER, LYOPHILIZED, FOR SOLUTION INTRAVENOUS; PARENTERAL at 13:39

## 2020-05-14 RX ADMIN — PROMETHAZINE HYDROCHLORIDE 25 MG: 25 TABLET ORAL at 16:57

## 2020-05-14 RX ADMIN — DOCUSATE SODIUM 50 MG AND SENNOSIDES 8.6 MG 2 TABLET: 8.6; 5 TABLET, FILM COATED ORAL at 05:24

## 2020-05-14 RX ADMIN — OXYCODONE 10 MG: 5 TABLET ORAL at 09:28

## 2020-05-14 RX ADMIN — LINEZOLID 600 MG: 600 TABLET, FILM COATED ORAL at 16:57

## 2020-05-14 RX ADMIN — MEROPENEM 500 MG: 500 INJECTION, POWDER, FOR SOLUTION INTRAVENOUS at 20:18

## 2020-05-14 RX ADMIN — OXYCODONE 10 MG: 5 TABLET ORAL at 20:16

## 2020-05-14 RX ADMIN — LOSARTAN POTASSIUM 25 MG: 25 TABLET, FILM COATED ORAL at 05:24

## 2020-05-14 RX ADMIN — OXYCODONE 10 MG: 5 TABLET ORAL at 05:23

## 2020-05-14 RX ADMIN — PROMETHAZINE HYDROCHLORIDE 25 MG: 25 TABLET ORAL at 05:23

## 2020-05-14 RX ADMIN — LINEZOLID 600 MG: 600 TABLET, FILM COATED ORAL at 05:24

## 2020-05-14 RX ADMIN — AMLODIPINE BESYLATE 10 MG: 10 TABLET ORAL at 05:24

## 2020-05-14 RX ADMIN — PIPERACILLIN AND TAZOBACTAM 4.5 G: 4; .5 INJECTION, POWDER, LYOPHILIZED, FOR SOLUTION INTRAVENOUS; PARENTERAL at 05:23

## 2020-05-14 RX ADMIN — ONDANSETRON 4 MG: 4 TABLET, ORALLY DISINTEGRATING ORAL at 20:38

## 2020-05-14 RX ADMIN — OXYCODONE 10 MG: 5 TABLET ORAL at 13:39

## 2020-05-14 RX ADMIN — SERTRALINE HYDROCHLORIDE 50 MG: 50 TABLET ORAL at 05:24

## 2020-05-14 RX ADMIN — DOCUSATE SODIUM 50 MG AND SENNOSIDES 8.6 MG 2 TABLET: 8.6; 5 TABLET, FILM COATED ORAL at 16:57

## 2020-05-14 RX ADMIN — ONDANSETRON 4 MG: 4 TABLET, ORALLY DISINTEGRATING ORAL at 09:28

## 2020-05-14 ASSESSMENT — ENCOUNTER SYMPTOMS
FEVER: 0
SINUS PAIN: 0
ABDOMINAL PAIN: 0
BLURRED VISION: 0
WEAKNESS: 0
DIARRHEA: 0
NERVOUS/ANXIOUS: 0
COUGH: 0
SHORTNESS OF BREATH: 0
CONSTIPATION: 0
PALPITATIONS: 0
SORE THROAT: 0
VOMITING: 0
HEMOPTYSIS: 0
HEADACHES: 0
DEPRESSION: 0
SPUTUM PRODUCTION: 0
CHILLS: 0
NAUSEA: 1
FALLS: 0
WHEEZING: 0
DIZZINESS: 0

## 2020-05-14 NOTE — CARE PLAN
Problem: Safety  Goal: Will remain free from injury  Outcome: PROGRESSING AS EXPECTED   Patient educated on safety. Precautions in place. Call light in reach. Will continue to monitor.    Problem: Knowledge Deficit  Goal: Knowledge of disease process/condition, treatment plan, diagnostic tests, and medications will improve  Outcome: PROGRESSING AS EXPECTED   RN discussed plan of care with patient including when medications are due. Patient states he has some 6/10 pain. PRN medication given. No other concerns at this time.

## 2020-05-14 NOTE — CARE PLAN
Problem: Safety  Goal: Will remain free from injury  Outcome: PROGRESSING AS EXPECTED  Note: Pt up with SBA and walker, steady gait noted. Pt remains free of falls. Safety precautions maintained.     Problem: Pain Management  Goal: Pain level will decrease to patient's comfort goal  Outcome: PROGRESSING AS EXPECTED  Note: Pain frequently assessed throughout shift. PRN Oxycodone and non pharmacological interventions utilized with relief.

## 2020-05-14 NOTE — PROGRESS NOTES
Assumed cares at 0700. Pt sleeping during bedside shift report, no signs of distress. PIV infusing IVF. Pt on RA. LLE c/d/i, wound vac running at 125 mmHg. Bed in lowest locked position, call light in reach. Safety precautions maintained. Isolation continued.

## 2020-05-14 NOTE — PROGRESS NOTES
Hospital Medicine Daily Progress Note    Date of Service  5/14/2020    Chief Complaint  64 y.o. male admitted 4/16/2020 with left leg redness and swelling    Hospital Course    Mr. Goran Chavez is a 64 y.o. male with past medical history significant for venous stasis dermatitis and stasis ulcers of bilateral lower extremities who presented on 4/16/2020 with left leg redness and swelling.  Patient was previously followed by wound care as an outpatient but was discharged when ulcers had resolved, but ulcers came back 1 week prior to presentation.  Patient has had multiple incision and drainages with a wound VAC in place.  Intraoperative wound culture grew Pseudomonas and group A strep.  Current plans for skin graft.        Interval Problem Update  Patient was seen and examined at bedside.  I have personally reviewed vitals, labs, and imaging.    5/12.  Patient has been hypertensive.  Patient denies fever, chills, chest pain, shortness of breath.  Patient does report nausea but is able to keep food down and has good p.o. intake.  5/13.  Blood pressure improved after started on losartan addition to lidocaine.  Afebrile.  Patient with pain in left lower extremity is well controlled.  Denies fever, chills, chest pain, shortness of breath.  After evaluation by wound care tenderness still visible and they would like more granulation tissue prior to skin graft.  Skin graft has again been postponed until 5/18.  Wound VAC was changed today.  Does complain of nausea but is adequate p.o. intake and keeping food down.  Platelets slightly trended down to 144 we will monitor  5/14.  Afebrile with stable vitals.  Hemoglobin 7.  No acute events overnight.  Still reports mild nausea.  Tolerating PO intake    Consultants/Specialty  Infectious disease  LPS  Ortho    Code Status  Full    Disposition  Pending medical and surgical clearance    Review of Systems  Review of Systems   Constitutional: Negative for chills and fever.    HENT: Negative for congestion, sinus pain and sore throat.    Eyes: Negative for blurred vision.   Respiratory: Negative for cough, hemoptysis, sputum production, shortness of breath and wheezing.    Cardiovascular: Negative for chest pain, palpitations and leg swelling.   Gastrointestinal: Positive for nausea. Negative for abdominal pain, constipation, diarrhea and vomiting.   Genitourinary: Negative for dysuria, frequency and urgency.   Musculoskeletal: Negative for falls.   Skin: Positive for rash (Left lower extremity).   Neurological: Negative for dizziness, weakness and headaches.   Psychiatric/Behavioral: Negative for depression. The patient is not nervous/anxious.    All other systems reviewed and are negative.       Physical Exam  Temp:  [36.5 °C (97.7 °F)-37.4 °C (99.3 °F)] 36.8 °C (98.3 °F)  Pulse:  [74-80] 79  Resp:  [18] 18  BP: (116-143)/(75-82) 116/82  SpO2:  [89 %-95 %] 95 %    Physical Exam  Vitals signs and nursing note reviewed.   Constitutional:       General: He is not in acute distress.     Appearance: Normal appearance. He is normal weight.   HENT:      Head: Normocephalic and atraumatic.      Nose: Nose normal.      Mouth/Throat:      Mouth: Mucous membranes are moist.      Pharynx: Oropharynx is clear.   Eyes:      Extraocular Movements: Extraocular movements intact.      Conjunctiva/sclera: Conjunctivae normal.   Neck:      Musculoskeletal: Normal range of motion and neck supple.   Cardiovascular:      Rate and Rhythm: Normal rate and regular rhythm.      Pulses: Normal pulses.      Heart sounds: Normal heart sounds. No murmur. No friction rub. No gallop.    Pulmonary:      Effort: Pulmonary effort is normal. No respiratory distress.      Breath sounds: Normal breath sounds. No wheezing or rales.   Chest:      Chest wall: No tenderness.   Abdominal:      General: Abdomen is flat. Bowel sounds are normal. There is no distension.      Palpations: Abdomen is soft. There is no mass.       Tenderness: There is no abdominal tenderness. There is no guarding.   Musculoskeletal: Normal range of motion.      Comments: Wound VAC on left lower extremity.   Skin:     General: Skin is warm.      Capillary Refill: Capillary refill takes less than 2 seconds.   Neurological:      General: No focal deficit present.      Mental Status: He is alert and oriented to person, place, and time. Mental status is at baseline.      Cranial Nerves: No cranial nerve deficit.      Motor: No weakness.   Psychiatric:         Mood and Affect: Mood normal.         Behavior: Behavior normal.         Thought Content: Thought content normal.         Judgment: Judgment normal.         Fluids    Intake/Output Summary (Last 24 hours) at 5/14/2020 0807  Last data filed at 5/13/2020 0829  Gross per 24 hour   Intake --   Output 250 ml   Net -250 ml       Laboratory  Recent Labs     05/12/20  0340 05/13/20  0114 05/14/20  0044   WBC 5.5 5.3 6.2   RBC 2.34* 2.31* 2.26*   HEMOGLOBIN 7.4* 7.3* 7.0*   HEMATOCRIT 22.6* 22.2* 22.0*   MCV 96.6 96.1 97.3   MCH 31.6 31.6 31.0   MCHC 32.7* 32.9* 31.8*   RDW 50.6* 50.1* 50.8*   PLATELETCT 158* 144* 143*   MPV 8.8* 9.1 8.9*     Recent Labs     05/13/20  0114   SODIUM 139   POTASSIUM 4.6   CHLORIDE 105   CO2 23   GLUCOSE 102*   BUN 19   CREATININE 1.09   CALCIUM 8.1*                   Imaging  IR-US GUIDED PIV   Final Result    Ultrasound-guided PERIPHERAL IV INSERTION performed by    qualified nursing staff as above.            CT-EXTREMITY, LOWER WITH LEFT   Final Result         1. No evidence of osteomyelitis.   2. Cellulitis and soft tissue organs of the left lower leg. Subcutaneous air without drainable abscess.   3. Suspected pes planovalgus deformity. Multifocal osteoarthrosis of the midfoot.      US-RENAL   Final Result      1.  Right kidney appears smaller than left kidney which could be due to atrophy.      2.  No hydronephrosis.      3.  Incidentally noted small right pleural effusion.       CT-EXTREMITY, LOWER WITH LEFT   Final Result      1.  Extensive subcutaneous edema with overlying skin thickening. Inflammation extends along the fascial planes with curvilinear fluid superficial to the gastrocnemius muscle. No soft tissue gas to suggest necrotizing fasciitis.      2.  Osteopenia           Assessment/Plan  * Venous stasis ulcer of left lower extremity (HCC)- (present on admission)  Assessment & Plan  With surrounding cellulitis improving. Arterial US negative for arterial insufficiency.  - s/p washout on 4/22, 4/24, 4/27 and 5/4  - ID and orthopedic surgery following, appreciate recs  - continue with linezolid and Zosyn, per ID recs  - abx to be continued x7 days post skin graft  - continue with wound care; per LPS, tendon still visible so recommending surgery for skin graft be postponed now until 5/18    Hyperkalemia  Assessment & Plan  Resolved.  Continue to monitor    Venous stasis dermatitis of both lower extremities- (present on admission)  Assessment & Plan  Chronic venous stasis of bilateral LE.   - management as above    CKD (chronic kidney disease) stage 3, GFR 30-59 ml/min ([facility])- (present on admission)  Assessment & Plan  Acute on chronic resolved. Cr now normalized.   - avoid nephrotoxic agents; renally dose medications  Monitor kidney function and urine output    Essential hypertension- (present on admission)  Assessment & Plan  Intermittently hypertensive.   - continue home amlodipine.  Started on losartan during admission  - PRN hydralazine    Constipation  Assessment & Plan  Improving.   - continue bowel regimen    Normocytic anemia- (present on admission)  Assessment & Plan  Likely anemia of chronic disease and in the setting of slow blood loss from his LE wound, which has now improved.   No active signs of bleeding.    Patient is hemodynamically stable and asymptomatic  Will continue to trend with CBC  s/p 1U PRBC on 4/29 and 5/5  Continue iron supplementation  Transfuse to  maintain hemoglobin greater than 7.  Results from last 7 days   Lab Units 05/14/20  0044 05/13/20  0114 05/12/20  0340 05/11/20  0113   HGB 1503 g/dL 7.0* 7.3* 7.4* 7.7*   HCT 1504 % 22.0* 22.2* 22.6* 23.7*   MCV 1505 fL 97.3 96.1 96.6 95.2     Folate -Folic Acid   Date Value Ref Range Status   12/28/2019 19.6 >4.0 ng/mL Final     Vitamin B12 -True Cobalamin   Date Value Ref Range Status   04/22/2020 1374 (H) 211 - 911 pg/mL Final     Reticulocyte Count   Date Value Ref Range Status   12/28/2019 2.1 0.8 - 2.1 % Final     Retic, Absolute   Date Value Ref Range Status   12/28/2019 0.08 (H) 0.04 - 0.06 M/uL Final     Imm. Reticulocyte Fraction   Date Value Ref Range Status   12/28/2019 24.4 (H) 9.3 - 17.4 % Final     Retic Hgb Equivalent   Date Value Ref Range Status   12/28/2019 29.2 29.0 - 35.0 pg/cell Final      Ferritin   Date Value Ref Range Status   12/28/2019 77.7 22.0 - 322.0 ng/mL Final     Iron   Date Value Ref Range Status   04/22/2020 27 (L) 50 - 180 ug/dL Final     Total Iron Binding   Date Value Ref Range Status   04/22/2020 131 (L) 250 - 450 ug/dL Final     % Saturation   Date Value Ref Range Status   04/22/2020 21 15 - 55 % Final         Methamphetamine abuse (HCC)- (present on admission)  Assessment & Plan  Positive UDS for amphetamine, oxycodone and marijuana.  - continued counseling and education  Not candidate for outpatient infusion for antibiotics    Alcohol abuse- (present on admission)  Assessment & Plan  - out of withdrawal window  - continued counseling and education  - correct electrolytes PRN    Anxiety- (present on admission)  Assessment & Plan  Stable.   - continue home sertraline  Monitor closely for serotonin syndrome as patient is also on linezolid    Gastrointestinal prophylaxis: The patient has no risk factors for developing gastric ulcers or gastritis.  As the patient is tolerating oral intake, GI prophylaxis is not indicated at this time.  Antibiotics: IV Zosyn, linezolid  Diet  Order Regular  Code status: Full  Prognosis: Guarded  Risk: The Patient is at HIGH risk for complications and decompensation secondary to his multiple cormorbidities including left lower extremity cellulitis complicated by poor healing from venous stasis dermatitis and venous stasis ulcers requiring IV antibiotics, multiple incision and drainage with washout, and wound VAC.  Hypertension, chronic kidney disease, anemia    I have personally reviewed notes, labs, vitals, imaging.  I discussed the plan of care with bedside RN as well as on multidisciplinary rounds     I have performed a physical exam and reviewed and updated ROS and Plan today (5/14/2020). In review of yesterday's note (5/13/2020), there are no changes except as documented above.       VTE prophylaxis: SCD on right leg.  Avoid anticoagulation in the setting of anemia requiring transfusions

## 2020-05-14 NOTE — DISCHARGE PLANNING
Anticipated Discharge Disposition:   · TBD     Action:   · Pt discussed in IDT rounds. Pt currently still pending graft which has been pushed to 05/18/2020 at this time. Pt is not medically clear for discharge and discharge needs are unknown pending graft.      Barriers to Discharge:   · TBD     Plan:   · Care coordination will continue to follow up and provide assistance with discharge plans/barriers.

## 2020-05-14 NOTE — PROGRESS NOTES
Infectious Disease Progress Note    Author: Mayte Fuller M.D. Date & Time of service: 5/14/2020  12:45 PM    Chief Complaint:  Follow-up for necrotizing LLE infection/cellulitis    Interval History:  64-year-old male with methamphetamine abuse, hepatitis C status post treatment, bilateral lower extremity venous stasis dermatitis with ulcerations, admitted 4/16/2020 with left leg cellulitis and fever  4/25 afebrile, white count 10.2, tolerating Zyvox and Zosyn.  Reports feeling better overall.  4/27 afebrile WBC 8.3 plan for repeat I&D today, denies pain, no new clinical issues  4/28 AF WBC 14.3 S/p repeat I&D yesterday. Wound vac removed given excessive bloody drainage. Rate pain 6-7/10 in severity.  4/30 AF WBC 7.5 had blood transfusion over night and feeling better. No new complaints. LLE dressed without any bloody drainage  5/1 AF WBC 7.3 LLE dressed-pain controlled States plan for skin graft possibly Mon. No new complaints  5/2 AF WBC 8 no new complaints-tolerated VAC change yesterday. Denies SE abx  5/3 AF no new complaints-plan for OR tomorrow  5/4 AF WBC 8.2-plan for OR still today-no time yet. No new complaints  5/5 AF WBC 8.3 seen with wound care-skin graft deferred due to continued necrosis and purulence ant tibial wound -possible placement tomorrow-denies SE abx-posterior calf wound healing well. Eating well  5/6 AF LPS note appreciated-surgery changed to 5/11-change VAC to Veraflo. Continues to tolerate abx well. No new complaints. Pain controlled  5/11 AF WBC 5.8 platelets 183 Pain controlled. Tolerating abx. No new complaints  5/12 AF WBC 5.5 graft delayed due to continued undermining c/o nausea today-no specific abd pain-has generlized discomfort. No emesis or diarrhea  5/13 AF sleeping soundly at time of rounds-no acute events  5/14 AF sleeping soundly again today. Wound pictures reviewed.   Labs Reviewed and Medications Reviewed.    Review of Systems:  Review of Systems   Unable to perform  ROS: Other   Constitutional: Negative for fever.       Hemodynamics:  Temp (24hrs), Av.9 °C (98.5 °F), Min:36.7 °C (98.1 °F), Max:37.4 °C (99.3 °F)  Temperature: 36.7 °C (98.1 °F)  Pulse  Av.2  Min: 54  Max: 127   Blood Pressure: 128/75       Physical Exam:  Physical Exam  Vitals signs and nursing note reviewed.   Constitutional:       General: He is not in acute distress.     Appearance: He is not ill-appearing, toxic-appearing or diaphoretic.      Comments: disheveled   HENT:      Nose: No rhinorrhea.      Mouth/Throat:      Comments: Poor dentition  Eyes:      General:         Right eye: No discharge.         Left eye: No discharge.   Cardiovascular:      Rate and Rhythm: Normal rate.   Pulmonary:      Effort: No respiratory distress.      Breath sounds: Normal breath sounds. No stridor. No wheezing or rhonchi.   Abdominal:      Palpations: Abdomen is soft.   Musculoskeletal:         General: Deformity present.      Comments: Left lower extremity dressed/VAC  Wound pictures reviewed  Wound 20 cm X 15 X 0.5 cm with decreased necrosis   Skin:     General: Skin is warm.      Coloration: Skin is not jaundiced.      Findings: No rash.         Meds:    Current Facility-Administered Medications:   •  losartan  •  linezolid  •  [COMPLETED] piperacillin-tazobactam **AND** piperacillin-tazobactam  •  silver nitrate  •  amLODIPine  •  diphenhydrAMINE  •  senna-docusate  •  sertraline  •  polyethylene glycol/lytes  •  magnesium hydroxide  •  atorvastatin  •  ferrous gluconate  •  acetaminophen  •  ondansetron  •  ondansetron  •  promethazine  •  promethazine  •  prochlorperazine  •  hydrALAZINE  •  oxyCODONE immediate-release  •  morphine injection    Labs:  Recent Labs     20  0340 20  0114 20  0044   WBC 5.5 5.3 6.2   RBC 2.34* 2.31* 2.26*   HEMOGLOBIN 7.4* 7.3* 7.0*   HEMATOCRIT 22.6* 22.2* 22.0*   MCV 96.6 96.1 97.3   MCH 31.6 31.6 31.0   RDW 50.6* 50.1* 50.8*   PLATELETCT 158* 144* 143*   MPV  8.8* 9.1 8.9*   NEUTSPOLYS 69.80 66.00 68.50   LYMPHOCYTES 15.20* 19.40* 18.70*   MONOCYTES 10.10 9.10 8.60   EOSINOPHILS 4.00 4.30 3.10   BASOPHILS 0.70 0.60 0.50     Recent Labs     05/13/20  0114   SODIUM 139   POTASSIUM 4.6   CHLORIDE 105   CO2 23   GLUCOSE 102*   BUN 19     Recent Labs     05/12/20  1927 05/13/20  0114   ALBUMIN 3.0*  --    TBILIRUBIN 0.4  --    ALKPHOSPHAT 82  --    TOTPROTEIN 6.2  --    ALTSGPT 9  --    ASTSGOT 11*  --    CREATININE  --  1.09       Imaging:  Ct-extremity, Lower With Left    Result Date: 4/21/2020 4/21/2020 5:16 PM HISTORY/REASON FOR EXAM:  Lower leg pain, suspected osteomyelitis. TECHNIQUE/EXAM DESCRIPTION AND NUMBER OF VIEWS:  CT scan of the LEFT lower extremity with contrast, with reconstructions. Thin helical 3 mm sections were obtained from the distal femur through the proximal tibia/fibula. Sagittal and coronal multiplanar reconstructions were generated from the axial images. A total of 100 mL of Omnipaque 350 nonionic contrast was administered  IV without complication. Up to date radiation dose reduction adjustments have been utilized to meet ALARA standards for radiation dose reduction. COMPARISON: 4/16/2020. FINDINGS: No acute fracture or dislocation is identified. Cellulitis of the lower leg, with soft tissue prominence and subcutaneous air. No subjacent cortical bone destruction to suggest osteomyelitis. No drainable abscess. There is chronic appearing valgus deviation of the foot at the subtalar joint, and there is suspected pes planovalgus. Moderate subtalar osteoarthrosis. Multifocal mild to moderate osteoarthrosis in the midfoot. Mild multifocal interphalangeal osteoarthrosis. Reactive popliteal lymphadenopathy.     1. No evidence of osteomyelitis. 2. Cellulitis and soft tissue organs of the left lower leg. Subcutaneous air without drainable abscess. 3. Suspected pes planovalgus deformity. Multifocal osteoarthrosis of the midfoot.    Ct-extremity, Lower With  Left    Result Date: 4/16/2020 4/16/2020 1:45 PM HISTORY/REASON FOR EXAM:  Lower leg swelling/redness, cellulitis suspected. Left lower extremity edema and redness TECHNIQUE/EXAM DESCRIPTION AND NUMBER OF VIEWS:  CT scan of the LEFT lower extremity with contrast, with reconstructions. Thin helical 3 mm sections were obtained from the distal femur through the proximal tibia/fibula. Sagittal and coronal multiplanar reconstructions were generated from the axial images. A total of 100 mL of Omnipaque 350 nonionic contrast was administered  IV without complication. Up to date radiation dose reduction adjustments have been utilized to meet ALARA standards for radiation dose reduction. COMPARISON: 12/26/2019 FINDINGS: There is diffuse subcutaneous edema in the lower leg. The overlying skin is markedly thickened. There is curvilinear fluid deep to the fascia, but superficial to the gastrocnemius muscle extending from the knee joint to the ankle. Edema extends into the deeper fascial planes. No soft tissue gas is identified. No definite muscle necrosis is identified. No thrombus is identified. The Achilles tendon is intact. The bones are osteopenic. No cortical destruction is identified.     1.  Extensive subcutaneous edema with overlying skin thickening. Inflammation extends along the fascial planes with curvilinear fluid superficial to the gastrocnemius muscle. No soft tissue gas to suggest necrotizing fasciitis. 2.  Osteopenia    Us-renal    Result Date: 4/19/2020 4/19/2020 10:53 AM HISTORY/REASON FOR EXAM:  Acute renal failure TECHNIQUE/EXAM DESCRIPTION: Renal ultrasound. COMPARISON:  08/15/2018 FINDINGS: The right kidney measures 8.11 cm. The left kidney measures 9.17 cm. There is no hydronephrosis. There are no abnormal calcifications. Small right pleural effusion is identified. The bladder demonstrates no focal wall abnormality.     1.  Right kidney appears smaller than left kidney which could be due to atrophy. 2.   No hydronephrosis. 3.  Incidentally noted small right pleural effusion.      Micro:  Results     ** No results found for the last 168 hours. **          Assessment/Plan:  Left lower extremity cellulitis, complicated. Poor healing. Additional work  Chronic venous stasis   Afebrile  Resolved leukocytosis  CT LLE on 4/21 with no OM or drainable abscess  s/p I&D on 4/22 down to muscle  S/p repeat I and D on 4/24 of mult compartments and placement of wound VAC: skin at the margins necrotic  S/p repeat I&D and [place] change on 4/27 by Dr. John  Blood culture negative  Wound cultures on 4/22 +GAS and MRSA  Intraoperative wound cx + pseudomonas aeruginosa and GAS  Repeat I&D on 5/4/2020-no skin graft due to necrosis  Continue linezolid and change IV zosyn to meropenem  Monitor CBC while on linezolid-platelets slight decrease to 143  Planned graft date now 5/18/2020  Unclear stop date given slow wound healing-anticipate 7 days from date of skin graft    Nausea  Eating  Normal LFTs and lipase    Thrombocytopenia  Slight decrease  No active bleeding  Continue to monitor closely while on Zyvox    Methamphetamine abuse  Not a candidate for [facility]    Hepatitis C   status post treatment  No additional treatment at this time    LUPE, improved  Avoiding nephrotoxic agents if possible      Prognosis for limb salvage guarded  [facility]

## 2020-05-15 LAB
ANION GAP SERPL CALC-SCNC: 15 MMOL/L (ref 7–16)
BASOPHILS # BLD AUTO: 0.7 % (ref 0–1.8)
BASOPHILS # BLD: 0.04 K/UL (ref 0–0.12)
BUN SERPL-MCNC: 19 MG/DL (ref 8–22)
CALCIUM SERPL-MCNC: 8.5 MG/DL (ref 8.5–10.5)
CHLORIDE SERPL-SCNC: 104 MMOL/L (ref 96–112)
CO2 SERPL-SCNC: 20 MMOL/L (ref 20–33)
CREAT SERPL-MCNC: 1.09 MG/DL (ref 0.5–1.4)
EOSINOPHIL # BLD AUTO: 0.19 K/UL (ref 0–0.51)
EOSINOPHIL NFR BLD: 3.4 % (ref 0–6.9)
ERYTHROCYTE [DISTWIDTH] IN BLOOD BY AUTOMATED COUNT: 51.7 FL (ref 35.9–50)
GLUCOSE SERPL-MCNC: 101 MG/DL (ref 65–99)
HCT VFR BLD AUTO: 22.2 % (ref 42–52)
HGB BLD-MCNC: 7.1 G/DL (ref 14–18)
IMM GRANULOCYTES # BLD AUTO: 0.05 K/UL (ref 0–0.11)
IMM GRANULOCYTES NFR BLD AUTO: 0.9 % (ref 0–0.9)
LYMPHOCYTES # BLD AUTO: 1.16 K/UL (ref 1–4.8)
LYMPHOCYTES NFR BLD: 20.9 % (ref 22–41)
MAGNESIUM SERPL-MCNC: 1.9 MG/DL (ref 1.5–2.5)
MCH RBC QN AUTO: 30.9 PG (ref 27–33)
MCHC RBC AUTO-ENTMCNC: 32 G/DL (ref 33.7–35.3)
MCV RBC AUTO: 96.5 FL (ref 81.4–97.8)
MONOCYTES # BLD AUTO: 0.54 K/UL (ref 0–0.85)
MONOCYTES NFR BLD AUTO: 9.7 % (ref 0–13.4)
NEUTROPHILS # BLD AUTO: 3.58 K/UL (ref 1.82–7.42)
NEUTROPHILS NFR BLD: 64.4 % (ref 44–72)
NRBC # BLD AUTO: 0.04 K/UL
NRBC BLD-RTO: 0.7 /100 WBC
PHOSPHATE SERPL-MCNC: 2.2 MG/DL (ref 2.5–4.5)
PLATELET # BLD AUTO: 143 K/UL (ref 164–446)
PMV BLD AUTO: 9.1 FL (ref 9–12.9)
POTASSIUM SERPL-SCNC: 4.5 MMOL/L (ref 3.6–5.5)
RBC # BLD AUTO: 2.3 M/UL (ref 4.7–6.1)
SODIUM SERPL-SCNC: 139 MMOL/L (ref 135–145)
WBC # BLD AUTO: 5.6 K/UL (ref 4.8–10.8)

## 2020-05-15 PROCEDURE — 306263 VAC CANNISTER W/GEL 500ML: Performed by: INTERNAL MEDICINE

## 2020-05-15 PROCEDURE — A9270 NON-COVERED ITEM OR SERVICE: HCPCS | Performed by: NURSE PRACTITIONER

## 2020-05-15 PROCEDURE — 700105 HCHG RX REV CODE 258: Performed by: INTERNAL MEDICINE

## 2020-05-15 PROCEDURE — 770021 HCHG ROOM/CARE - ISO PRIVATE

## 2020-05-15 PROCEDURE — 700102 HCHG RX REV CODE 250 W/ 637 OVERRIDE(OP): Performed by: NURSE PRACTITIONER

## 2020-05-15 PROCEDURE — 700111 HCHG RX REV CODE 636 W/ 250 OVERRIDE (IP): Performed by: FAMILY MEDICINE

## 2020-05-15 PROCEDURE — 700102 HCHG RX REV CODE 250 W/ 637 OVERRIDE(OP): Performed by: HOSPITALIST

## 2020-05-15 PROCEDURE — 700111 HCHG RX REV CODE 636 W/ 250 OVERRIDE (IP): Performed by: INTERNAL MEDICINE

## 2020-05-15 PROCEDURE — 80048 BASIC METABOLIC PNL TOTAL CA: CPT

## 2020-05-15 PROCEDURE — 84100 ASSAY OF PHOSPHORUS: CPT

## 2020-05-15 PROCEDURE — 97606 NEG PRS WND THER DME>50 SQCM: CPT

## 2020-05-15 PROCEDURE — A9270 NON-COVERED ITEM OR SERVICE: HCPCS | Performed by: INTERNAL MEDICINE

## 2020-05-15 PROCEDURE — 700102 HCHG RX REV CODE 250 W/ 637 OVERRIDE(OP): Performed by: INTERNAL MEDICINE

## 2020-05-15 PROCEDURE — 99233 SBSQ HOSP IP/OBS HIGH 50: CPT | Performed by: INTERNAL MEDICINE

## 2020-05-15 PROCEDURE — 700102 HCHG RX REV CODE 250 W/ 637 OVERRIDE(OP): Performed by: FAMILY MEDICINE

## 2020-05-15 PROCEDURE — A9270 NON-COVERED ITEM OR SERVICE: HCPCS | Performed by: HOSPITALIST

## 2020-05-15 PROCEDURE — 306263 VAC CANNISTER W/GEL 500ML: Performed by: ORTHOPAEDIC SURGERY

## 2020-05-15 PROCEDURE — 36415 COLL VENOUS BLD VENIPUNCTURE: CPT

## 2020-05-15 PROCEDURE — 99232 SBSQ HOSP IP/OBS MODERATE 35: CPT | Performed by: INTERNAL MEDICINE

## 2020-05-15 PROCEDURE — 83735 ASSAY OF MAGNESIUM: CPT

## 2020-05-15 PROCEDURE — 85025 COMPLETE CBC W/AUTO DIFF WBC: CPT

## 2020-05-15 PROCEDURE — A9270 NON-COVERED ITEM OR SERVICE: HCPCS | Performed by: FAMILY MEDICINE

## 2020-05-15 RX ADMIN — OXYCODONE 10 MG: 5 TABLET ORAL at 21:30

## 2020-05-15 RX ADMIN — ATORVASTATIN CALCIUM 40 MG: 40 TABLET, FILM COATED ORAL at 05:55

## 2020-05-15 RX ADMIN — ONDANSETRON 4 MG: 4 TABLET, ORALLY DISINTEGRATING ORAL at 05:55

## 2020-05-15 RX ADMIN — MEROPENEM 500 MG: 500 INJECTION, POWDER, FOR SOLUTION INTRAVENOUS at 09:05

## 2020-05-15 RX ADMIN — AMLODIPINE BESYLATE 10 MG: 10 TABLET ORAL at 05:55

## 2020-05-15 RX ADMIN — FERROUS GLUCONATE 324 MG: 324 TABLET ORAL at 09:04

## 2020-05-15 RX ADMIN — OXYCODONE 10 MG: 5 TABLET ORAL at 00:38

## 2020-05-15 RX ADMIN — DIBASIC SODIUM PHOSPHATE, MONOBASIC POTASSIUM PHOSPHATE AND MONOBASIC SODIUM PHOSPHATE 2 TABLET: 852; 155; 130 TABLET ORAL at 11:54

## 2020-05-15 RX ADMIN — LOSARTAN POTASSIUM 25 MG: 25 TABLET, FILM COATED ORAL at 05:55

## 2020-05-15 RX ADMIN — MEROPENEM 500 MG: 500 INJECTION, POWDER, FOR SOLUTION INTRAVENOUS at 20:08

## 2020-05-15 RX ADMIN — LINEZOLID 600 MG: 600 TABLET, FILM COATED ORAL at 17:10

## 2020-05-15 RX ADMIN — ONDANSETRON 4 MG: 2 INJECTION INTRAMUSCULAR; INTRAVENOUS at 20:15

## 2020-05-15 RX ADMIN — ONDANSETRON 4 MG: 4 TABLET, ORALLY DISINTEGRATING ORAL at 11:58

## 2020-05-15 RX ADMIN — MEROPENEM 500 MG: 500 INJECTION, POWDER, FOR SOLUTION INTRAVENOUS at 13:52

## 2020-05-15 RX ADMIN — SERTRALINE HYDROCHLORIDE 50 MG: 50 TABLET ORAL at 05:55

## 2020-05-15 RX ADMIN — DIBASIC SODIUM PHOSPHATE, MONOBASIC POTASSIUM PHOSPHATE AND MONOBASIC SODIUM PHOSPHATE 2 TABLET: 852; 155; 130 TABLET ORAL at 17:10

## 2020-05-15 RX ADMIN — MORPHINE SULFATE 4 MG: 4 INJECTION INTRAVENOUS at 12:42

## 2020-05-15 RX ADMIN — OXYCODONE 10 MG: 5 TABLET ORAL at 05:56

## 2020-05-15 RX ADMIN — DOCUSATE SODIUM 50 MG AND SENNOSIDES 8.6 MG 2 TABLET: 8.6; 5 TABLET, FILM COATED ORAL at 17:11

## 2020-05-15 RX ADMIN — OXYCODONE 10 MG: 5 TABLET ORAL at 11:58

## 2020-05-15 RX ADMIN — ONDANSETRON 4 MG: 4 TABLET, ORALLY DISINTEGRATING ORAL at 00:38

## 2020-05-15 RX ADMIN — MEROPENEM 500 MG: 500 INJECTION, POWDER, FOR SOLUTION INTRAVENOUS at 01:58

## 2020-05-15 RX ADMIN — OXYCODONE 10 MG: 5 TABLET ORAL at 17:14

## 2020-05-15 RX ADMIN — LINEZOLID 600 MG: 600 TABLET, FILM COATED ORAL at 05:56

## 2020-05-15 ASSESSMENT — ENCOUNTER SYMPTOMS
NAUSEA: 1
SHORTNESS OF BREATH: 0
DIARRHEA: 0
FALLS: 0
CONSTIPATION: 0
HEMOPTYSIS: 0
ABDOMINAL PAIN: 0
DEPRESSION: 0
WEAKNESS: 0
MYALGIAS: 1
SORE THROAT: 0
BLURRED VISION: 0
HEADACHES: 0
VOMITING: 0
COUGH: 0
DIZZINESS: 0
WHEEZING: 0
SPUTUM PRODUCTION: 0
SENSORY CHANGE: 1
CHILLS: 0
FEVER: 0
PALPITATIONS: 0
SINUS PAIN: 0
NERVOUS/ANXIOUS: 0

## 2020-05-15 NOTE — PROGRESS NOTES
Pt care assumed at 1900. Pt complains of pain overnight, but pain medication does provide relief. Pt's only other complaint is nausea, PRN zofran provided. Wound vac in place to LLE. Dressing c/d/i. Pt complains of numbness to b/l LE, but this is his normal, he denies any changes in sensation. Pt is able to wiggle toes. No other complaints overnight. Pt rings appropriately for assistance when needed. Bed locked and in low position and call bell within reach. Bed alarm on for patient safety. Will continue to monitor.

## 2020-05-15 NOTE — CARE PLAN
Problem: Safety  Goal: Will remain free from injury  Outcome: PROGRESSING AS EXPECTED  - Safety precautions remain in place for pt. Re- educated pt on use of call bell and assistance when OOB. Pt verbalizes understanding. Call bell in reach, bed locked and in low position.      Problem: Pain Management  Goal: Pain level will decrease to patient's comfort goal  Outcome: PROGRESSING AS EXPECTED  - Pain plan reviewed with pt. Pt continues to have pain, but current medication regimen relieving pain per pt. Will continue to monitor any changes in pain.

## 2020-05-15 NOTE — PROGRESS NOTES
Infectious Disease Progress Note    Author: Karen Hemphill M.D. Date & Time of service: 5/15/2020  8:28 AM    Chief Complaint:  Follow-up for necrotizing LLE infection/cellulitis    Interval History:  64-year-old male with methamphetamine abuse, hepatitis C status post treatment, bilateral lower extremity venous stasis dermatitis with ulcerations admitted for above     AF WBC 5.8 platelets 183 Pain controlled. Tolerating abx. No new complaints   AF WBC 5.5 graft delayed due to continued undermining c/o nausea today-no specific abd pain-has generlized discomfort. No emesis or diarrhea   AF sleeping soundly at time of rounds-no acute events   AF sleeping soundly again today. Wound pictures reviewed.   5/15 afebrile WBC 5.6 patient complaining of ongoing nausea.  Otherwise no new symptoms to report    Labs Reviewed and Medications Reviewed.    Review of Systems:  Review of Systems   Constitutional: Negative for chills and fever.   Respiratory: Negative for cough and shortness of breath.    Gastrointestinal: Positive for nausea. Negative for abdominal pain and vomiting.   Musculoskeletal: Positive for myalgias.   Neurological: Positive for sensory change.   All other systems reviewed and are negative.      Hemodynamics:  Temp (24hrs), Av °C (98.6 °F), Min:36.7 °C (98.1 °F), Max:37.3 °C (99.2 °F)  Temperature: 36.8 °C (98.3 °F)  Pulse  Av.2  Min: 54  Max: 127   Blood Pressure: 140/80       Physical Exam:  Physical Exam  Vitals signs and nursing note reviewed.   Constitutional:       General: He is not in acute distress.     Appearance: He is not ill-appearing, toxic-appearing or diaphoretic.      Comments: disheveled   HENT:      Nose: No rhinorrhea.      Mouth/Throat:      Comments: Poor dentition  Eyes:      General:         Right eye: No discharge.         Left eye: No discharge.      Extraocular Movements: Extraocular movements intact.      Pupils: Pupils are equal, round, and reactive to  light.   Cardiovascular:      Rate and Rhythm: Normal rate.   Pulmonary:      Effort: No respiratory distress.      Breath sounds: Normal breath sounds. No stridor. No wheezing or rhonchi.   Abdominal:      Palpations: Abdomen is soft.   Musculoskeletal:         General: Deformity present.      Comments: Left lower extremity dressed/VAC     Skin:     General: Skin is warm.      Coloration: Skin is not jaundiced.      Findings: No rash.   Neurological:      Mental Status: He is alert.   Psychiatric:         Mood and Affect: Mood normal.         Behavior: Behavior normal.         Meds:    Current Facility-Administered Medications:   •  meropenem  •  losartan  •  linezolid  •  silver nitrate  •  amLODIPine  •  diphenhydrAMINE  •  senna-docusate  •  sertraline  •  polyethylene glycol/lytes  •  magnesium hydroxide  •  atorvastatin  •  ferrous gluconate  •  acetaminophen  •  ondansetron  •  ondansetron  •  promethazine  •  promethazine  •  prochlorperazine  •  hydrALAZINE  •  oxyCODONE immediate-release  •  morphine injection    Labs:  Recent Labs     05/13/20 0114 05/14/20 0044 05/15/20  0052   WBC 5.3 6.2 5.6   RBC 2.31* 2.26* 2.30*   HEMOGLOBIN 7.3* 7.0* 7.1*   HEMATOCRIT 22.2* 22.0* 22.2*   MCV 96.1 97.3 96.5   MCH 31.6 31.0 30.9   RDW 50.1* 50.8* 51.7*   PLATELETCT 144* 143* 143*   MPV 9.1 8.9* 9.1   NEUTSPOLYS 66.00 68.50 64.40   LYMPHOCYTES 19.40* 18.70* 20.90*   MONOCYTES 9.10 8.60 9.70   EOSINOPHILS 4.30 3.10 3.40   BASOPHILS 0.60 0.50 0.70     Recent Labs     05/13/20  0114 05/15/20  0052   SODIUM 139 139   POTASSIUM 4.6 4.5   CHLORIDE 105 104   CO2 23 20   GLUCOSE 102* 101*   BUN 19 19     Recent Labs     05/12/20  1927 05/13/20  0114 05/15/20  0052   ALBUMIN 3.0*  --   --    TBILIRUBIN 0.4  --   --    ALKPHOSPHAT 82  --   --    TOTPROTEIN 6.2  --   --    ALTSGPT 9  --   --    ASTSGOT 11*  --   --    CREATININE  --  1.09 1.09       Imaging:  Ct-extremity, Lower With Left    Result Date: 4/21/2020 4/21/2020  5:16 PM HISTORY/REASON FOR EXAM:  Lower leg pain, suspected osteomyelitis. TECHNIQUE/EXAM DESCRIPTION AND NUMBER OF VIEWS:  CT scan of the LEFT lower extremity with contrast, with reconstructions. Thin helical 3 mm sections were obtained from the distal femur through the proximal tibia/fibula. Sagittal and coronal multiplanar reconstructions were generated from the axial images. A total of 100 mL of Omnipaque 350 nonionic contrast was administered  IV without complication. Up to date radiation dose reduction adjustments have been utilized to meet ALARA standards for radiation dose reduction. COMPARISON: 4/16/2020. FINDINGS: No acute fracture or dislocation is identified. Cellulitis of the lower leg, with soft tissue prominence and subcutaneous air. No subjacent cortical bone destruction to suggest osteomyelitis. No drainable abscess. There is chronic appearing valgus deviation of the foot at the subtalar joint, and there is suspected pes planovalgus. Moderate subtalar osteoarthrosis. Multifocal mild to moderate osteoarthrosis in the midfoot. Mild multifocal interphalangeal osteoarthrosis. Reactive popliteal lymphadenopathy.     1. No evidence of osteomyelitis. 2. Cellulitis and soft tissue organs of the left lower leg. Subcutaneous air without drainable abscess. 3. Suspected pes planovalgus deformity. Multifocal osteoarthrosis of the midfoot.    Ct-extremity, Lower With Left    Result Date: 4/16/2020 4/16/2020 1:45 PM HISTORY/REASON FOR EXAM:  Lower leg swelling/redness, cellulitis suspected. Left lower extremity edema and redness TECHNIQUE/EXAM DESCRIPTION AND NUMBER OF VIEWS:  CT scan of the LEFT lower extremity with contrast, with reconstructions. Thin helical 3 mm sections were obtained from the distal femur through the proximal tibia/fibula. Sagittal and coronal multiplanar reconstructions were generated from the axial images. A total of 100 mL of Omnipaque 350 nonionic contrast was administered  IV without  complication. Up to date radiation dose reduction adjustments have been utilized to meet ALARA standards for radiation dose reduction. COMPARISON: 12/26/2019 FINDINGS: There is diffuse subcutaneous edema in the lower leg. The overlying skin is markedly thickened. There is curvilinear fluid deep to the fascia, but superficial to the gastrocnemius muscle extending from the knee joint to the ankle. Edema extends into the deeper fascial planes. No soft tissue gas is identified. No definite muscle necrosis is identified. No thrombus is identified. The Achilles tendon is intact. The bones are osteopenic. No cortical destruction is identified.     1.  Extensive subcutaneous edema with overlying skin thickening. Inflammation extends along the fascial planes with curvilinear fluid superficial to the gastrocnemius muscle. No soft tissue gas to suggest necrotizing fasciitis. 2.  Osteopenia    Us-renal    Result Date: 4/19/2020 4/19/2020 10:53 AM HISTORY/REASON FOR EXAM:  Acute renal failure TECHNIQUE/EXAM DESCRIPTION: Renal ultrasound. COMPARISON:  08/15/2018 FINDINGS: The right kidney measures 8.11 cm. The left kidney measures 9.17 cm. There is no hydronephrosis. There are no abnormal calcifications. Small right pleural effusion is identified. The bladder demonstrates no focal wall abnormality.     1.  Right kidney appears smaller than left kidney which could be due to atrophy. 2.  No hydronephrosis. 3.  Incidentally noted small right pleural effusion.      Micro:  Results     ** No results found for the last 168 hours. **          Assessment/Plan:  Left lower extremity cellulitis, complicated. Poor healing. Additional work  Chronic venous stasis   Afebrile  Resolved leukocytosis  CT LLE on 4/21 with no OM or drainable abscess  s/p I&D on 4/22 down to muscle  S/p repeat I and D on 4/24 of mult compartments and placement of wound VAC: skin at the margins necrotic  S/p repeat I&D and WV change on 4/27 by Dr. Dora Quiroga  culture negative  Wound cultures on 4/22 +GAS and MRSA  Intraoperative wound cx + pseudomonas aeruginosa and GAS  Repeat I&D on 5/4/2020-no skin graft due to necrosis  Continue linezolid   IV zosyn changed to meropenem on 5/14   Monitor CBC while on linezolid-platelets slight decrease to 143  Planned graft date now 5/18/2020  Unclear stop date given slow wound healing-anticipate 7 days from date of skin graft    Nausea, persistent  Eating  Normal LFTs and lipase    Thrombocytopenia  Slight decrease  No active bleeding  Continue to monitor closely while on Zyvox    Methamphetamine abuse  Not a candidate for [facility]IC    Hepatitis C   status post treatment  No additional treatment at this time    LUPE, resolved  Avoiding nephrotoxic agents if possible    Prognosis for limb salvage guarded     Plan of care discussed with hospitalist Dr. Burnett

## 2020-05-15 NOTE — PROGRESS NOTES
[facility] Medicine Daily Progress Note    Date of Service  5/15/2020    Chief Complaint  64 y.o. male admitted 4/16/2020 with left leg redness and swelling    Hospital Course    Mr. Goran Chavez is a 64 y.o. male with past medical history significant for venous stasis dermatitis and stasis ulcers of bilateral lower extremities who presented on 4/16/2020 with left leg redness and swelling.  Patient was previously followed by wound care as an outpatient but was discharged when ulcers had resolved, but ulcers came back 1 week prior to presentation.  Patient has had multiple incision and drainages with a wound VAC in place.  Intraoperative wound culture grew Pseudomonas and group A strep.  Current plans for skin graft.        Interval Problem Update  Patient was seen and examined at bedside.  I have personally reviewed vitals, labs, and imaging.    5/12.  Patient has been hypertensive.  Patient denies fever, chills, chest pain, shortness of breath.  Patient does report nausea but is able to keep food down and has good p.o. intake.  5/13.  Blood pressure improved after started on losartan addition to lidocaine.  Afebrile.  Patient with pain in left lower extremity is well controlled.  Denies fever, chills, chest pain, shortness of breath.  After evaluation by wound care tenderness still visible and they would like more granulation tissue prior to skin graft.  Skin graft has again been postponed until 5/18.  Wound VAC was changed today.  Does complain of nausea but is adequate p.o. intake and keeping food down.  Platelets slightly trended down to 144 we will monitor  5/14.  Afebrile with stable vitals.  Hemoglobin 7.  No acute events overnight.  Still reports mild nausea.  Tolerating PO intake  5/15.  Afebrile with stable vitals.  Switched from Zosyn to Meropenem yesterday per ID.  Platelets stable on Linezolid.  Denies fever, chills, chest pain, shortness of breath.  Endorses chronic  nausea    Consultants/Specialty  Infectious disease  LPS  Ortho    Code Status  Full    Disposition  Pending medical and surgical clearance    Review of Systems  Review of Systems   Constitutional: Negative for chills and fever.   HENT: Negative for congestion, sinus pain and sore throat.    Eyes: Negative for blurred vision.   Respiratory: Negative for cough, hemoptysis, sputum production, shortness of breath and wheezing.    Cardiovascular: Negative for chest pain, palpitations and leg swelling.   Gastrointestinal: Positive for nausea. Negative for abdominal pain, constipation, diarrhea and vomiting.   Genitourinary: Negative for dysuria, frequency and urgency.   Musculoskeletal: Negative for falls.   Skin: Positive for rash (Left lower extremity).   Neurological: Negative for dizziness, weakness and headaches.   Psychiatric/Behavioral: Negative for depression. The patient is not nervous/anxious.    All other systems reviewed and are negative.       Physical Exam  Temp:  [36.8 °C (98.3 °F)-37.3 °C (99.2 °F)] 36.8 °C (98.3 °F)  Pulse:  [75-78] 78  Resp:  [16-18] 18  BP: (121-140)/(63-80) 140/80  SpO2:  [93 %-96 %] 93 %    Physical Exam  Vitals signs and nursing note reviewed.   Constitutional:       General: He is not in acute distress.     Appearance: Normal appearance. He is normal weight.   HENT:      Head: Normocephalic and atraumatic.      Nose: Nose normal.      Mouth/Throat:      Mouth: Mucous membranes are moist.      Pharynx: Oropharynx is clear.   Eyes:      Extraocular Movements: Extraocular movements intact.      Conjunctiva/sclera: Conjunctivae normal.   Neck:      Musculoskeletal: Normal range of motion and neck supple.   Cardiovascular:      Rate and Rhythm: Normal rate and regular rhythm.      Pulses: Normal pulses.      Heart sounds: Normal heart sounds. No murmur. No friction rub. No gallop.    Pulmonary:      Effort: Pulmonary effort is normal. No respiratory distress.      Breath sounds: Normal  breath sounds. No wheezing or rales.   Chest:      Chest wall: No tenderness.   Abdominal:      General: Abdomen is flat. Bowel sounds are normal. There is no distension.      Palpations: Abdomen is soft. There is no mass.      Tenderness: There is no abdominal tenderness. There is no guarding.   Musculoskeletal: Normal range of motion.      Comments: Wound VAC on left lower extremity.   Skin:     General: Skin is warm.      Capillary Refill: Capillary refill takes less than 2 seconds.   Neurological:      General: No focal deficit present.      Mental Status: He is alert and oriented to person, place, and time. Mental status is at baseline.      Cranial Nerves: No cranial nerve deficit.      Motor: No weakness.   Psychiatric:         Mood and Affect: Mood normal.         Behavior: Behavior normal.         Thought Content: Thought content normal.         Judgment: Judgment normal.         Fluids    Intake/Output Summary (Last 24 hours) at 5/15/2020 1001  Last data filed at 5/15/2020 0900  Gross per 24 hour   Intake 740 ml   Output 1550 ml   Net -810 ml       Laboratory  Recent Labs     05/13/20  0114 05/14/20  0044 05/15/20  0052   WBC 5.3 6.2 5.6   RBC 2.31* 2.26* 2.30*   HEMOGLOBIN 7.3* 7.0* 7.1*   HEMATOCRIT 22.2* 22.0* 22.2*   MCV 96.1 97.3 96.5   MCH 31.6 31.0 30.9   MCHC 32.9* 31.8* 32.0*   RDW 50.1* 50.8* 51.7*   PLATELETCT 144* 143* 143*   MPV 9.1 8.9* 9.1     Recent Labs     05/13/20  0114 05/15/20  0052   SODIUM 139 139   POTASSIUM 4.6 4.5   CHLORIDE 105 104   CO2 23 20   GLUCOSE 102* 101*   BUN 19 19   CREATININE 1.09 1.09   CALCIUM 8.1* 8.5                   Imaging  IR-US GUIDED PIV   Final Result    Ultrasound-guided PERIPHERAL IV INSERTION performed by    qualified nursing staff as above.            CT-EXTREMITY, LOWER WITH LEFT   Final Result         1. No evidence of osteomyelitis.   2. Cellulitis and soft tissue organs of the left lower leg. Subcutaneous air without drainable abscess.   3.  Suspected pes planovalgus deformity. Multifocal osteoarthrosis of the midfoot.      US-RENAL   Final Result      1.  Right kidney appears smaller than left kidney which could be due to atrophy.      2.  No hydronephrosis.      3.  Incidentally noted small right pleural effusion.      CT-EXTREMITY, LOWER WITH LEFT   Final Result      1.  Extensive subcutaneous edema with overlying skin thickening. Inflammation extends along the fascial planes with curvilinear fluid superficial to the gastrocnemius muscle. No soft tissue gas to suggest necrotizing fasciitis.      2.  Osteopenia           Assessment/Plan  * Venous stasis ulcer of left lower extremity (HCC)- (present on admission)  Assessment & Plan  With surrounding cellulitis improving. Arterial US negative for arterial insufficiency.  - s/p washout on 4/22, 4/24, 4/27 and 5/4  - ID and orthopedic surgery following, appreciate recs  - continue with linezolid and Meropenem, per ID recs  - abx to be continued x7 days post skin graft  - continue with wound care; per LPS, tendon still visible so recommending surgery for skin graft be postponed now until 5/18    Hyperkalemia  Assessment & Plan  Resolved.  Continue to monitor    Venous stasis dermatitis of both lower extremities- (present on admission)  Assessment & Plan  Chronic venous stasis of bilateral LE.   - management as above    CKD (chronic kidney disease) stage 3, GFR 30-59 ml/min ([facility])- (present on admission)  Assessment & Plan  Acute on chronic resolved. Cr now normalized.   - avoid nephrotoxic agents; renally dose medications  Monitor kidney function and urine output    Essential hypertension- (present on admission)  Assessment & Plan  Intermittently hypertensive.   - continue home amlodipine.  Started on losartan during admission  - PRN hydralazine    Constipation  Assessment & Plan  Improving.   - continue bowel regimen    Normocytic anemia- (present on admission)  Assessment & Plan  Likely anemia of chronic  disease and in the setting of slow blood loss from his LE wound, which has now improved.   No active signs of bleeding.    Patient is hemodynamically stable and asymptomatic  Will continue to trend with CBC  s/p 1U PRBC on 4/29 and 5/5  Continue iron supplementation  Transfuse to maintain hemoglobin greater than 7.  Results from last 7 days   Lab Units 05/15/20  0052 05/14/20  0044 05/13/20  0114 05/12/20  0340   HGB 1503 g/dL 7.1* 7.0* 7.3* 7.4*   HCT 1504 % 22.2* 22.0* 22.2* 22.6*   MCV 1505 fL 96.5 97.3 96.1 96.6     Folate -Folic Acid   Date Value Ref Range Status   12/28/2019 19.6 >4.0 ng/mL Final     Vitamin B12 -True Cobalamin   Date Value Ref Range Status   04/22/2020 1374 (H) 211 - 911 pg/mL Final     Reticulocyte Count   Date Value Ref Range Status   12/28/2019 2.1 0.8 - 2.1 % Final     Retic, Absolute   Date Value Ref Range Status   12/28/2019 0.08 (H) 0.04 - 0.06 M/uL Final     Imm. Reticulocyte Fraction   Date Value Ref Range Status   12/28/2019 24.4 (H) 9.3 - 17.4 % Final     Retic Hgb Equivalent   Date Value Ref Range Status   12/28/2019 29.2 29.0 - 35.0 pg/cell Final      Ferritin   Date Value Ref Range Status   12/28/2019 77.7 22.0 - 322.0 ng/mL Final     Iron   Date Value Ref Range Status   04/22/2020 27 (L) 50 - 180 ug/dL Final     Total Iron Binding   Date Value Ref Range Status   04/22/2020 131 (L) 250 - 450 ug/dL Final     % Saturation   Date Value Ref Range Status   04/22/2020 21 15 - 55 % Final         Methamphetamine abuse (HCC)- (present on admission)  Assessment & Plan  Positive UDS for amphetamine, oxycodone and marijuana.  - continued counseling and education  Not candidate for outpatient infusion for antibiotics    Alcohol abuse- (present on admission)  Assessment & Plan  - out of withdrawal window  - continued counseling and education  - correct electrolytes PRN    Anxiety- (present on admission)  Assessment & Plan  Stable.   - continue home sertraline  Monitor closely for serotonin  syndrome as patient is also on linezolid    Gastrointestinal prophylaxis: The patient has no risk factors for developing gastric ulcers or gastritis.  As the patient is tolerating oral intake, GI prophylaxis is not indicated at this time.  Antibiotics: IV Meropenem, linezolid  Diet Order Regular  Code status: Full  Prognosis: Guarded  Risk: The Patient is at HIGH risk for complications and decompensation secondary to his multiple cormorbidities including left lower extremity cellulitis complicated by poor healing from venous stasis dermatitis and venous stasis ulcers requiring IV antibiotics, multiple incision and drainage with washout, and wound VAC.  Hypertension, chronic kidney disease, anemia requiring blood transfusions    I have personally reviewed notes, labs, vitals, imaging.  I discussed the plan of care with bedside RN as well as on multidisciplinary rounds     I have performed a physical exam and reviewed and updated ROS and Plan today (5/15/2020). In review of yesterday's note (5/14/2020), there are no changes except as documented above.       VTE prophylaxis: SCD on right leg.  Avoid anticoagulation in the setting of anemia requiring transfusions

## 2020-05-15 NOTE — WOUND TEAM
[facility]  Inpatient Services  Wound and Skin Care Follow Up    Admission Date: 4/16/2020     Last order of IP CONSULT TO WOUND CARE was found on 4/24/2020 from Hospital Encounter on 4/16/2020     HPI, PMH, SH: Reviewed    Unit where seen by Wound Team: [id number]     WOUND CONSULT/FOLLOW UP RELATED TO:  LLE Vac change    Self Report / Pain Level:  Pre-medicated with IV medication. Minimal pain noted.       OBJECTIVE:  Two layer in place, vac dressings in place and sponge compressed.    WOUND TYPE, LOCATION, CHARACTERISTICS (Pressure Injuries: location, stage, POA or date identified)     Negative Pressure Wound Therapy 05/05/20 Surgical (Active)   NPWT Pump Mode / Pressure Setting Intermittent;125 mmHg 05/15/20    Dressing Type Black Foam (Regular);Black Foam (Veraflo);Medium    Number of Foam Pieces Used 5    Canister Changed No    Output (mL) 500 mL    NEXT Dressing Change/Treatment Date 05/18/20    VAC VeraFlo Irrigant Normal Saline    VAC VeraFlo Soak Time (mins) 6    VAC VeraFlo Instill Volume (ml) 20    VAC VeraFlo - Therapy Time (hrs) 2    VAC VeraFlo Pressure (mm/Hg) Continuous;125 mmHg      Wound 05/05/20 Full Thickness Wound Leg Lateral;Lower;Posterior;Medial Left open complicated surgical (Active)        05/13/20   Site Assessment Red;Early/partial granulation 05/15/20    Periwound Assessment Intact    Margins Attached edges;Defined edges    Closure Secondary intention    Drainage Amount Scant    Drainage Description Serosanguineous    Treatments Cleansed;Site care    Wound Cleansing Approved Wound Cleanser    Periwound Protectant Benzoin;Drape;Paste Ring    Dressing Cleansing/Solutions Normal Saline    Dressing Options Wound Vac    Dressing Changed Changed    Dressing Status Clean;Dry;Intact    Dressing Change/Treatment Frequency Monday, Wednesday, Friday, and As Needed    NEXT Dressing Change/Treatment Date 05/18/20    NEXT Weekly Photo (Inpatient Only) 05/20/20    Non-staged Wound  Description Full thickness    Wound Length (cm) 20 cm 05/13/20    Wound Width (cm) 15 cm 05/13/20    Wound Depth (cm) 0.5 cm 05/13/20    Wound Surface Area (cm^2) 300 cm^2 05/13/20    Wound Volume (cm^3) 150 cm^3 05/13/20    Wound Healing % 28 05/13/20    Tunneling (cm) 2.5 cm 05/13/20    Tunneling Clock Position of Wound 11 05/13/20    Shape Irregular 05/15/20   Wound Odor None    Pulses Left;1+;DP;PT    Exposed Structures Tendon;Muscle      Vascular:    BHUPINDER:   No results found.      Lab Values:    Lab Results   Component Value Date/Time    WBC 5.6 05/15/2020 12:52 AM    RBC 2.30 (L) 05/15/2020 12:52 AM    HEMOGLOBIN 7.1 (L) 05/15/2020 12:52 AM    HEMATOCRIT 22.2 (L) 05/15/2020 12:52 AM    CREACTPROT 14.44 (H) 04/22/2020 12:49 AM    SEDRATEWES 80 (H) 05/14/2020 08:46 AM    HBA1C 5.7 (H) 01/23/2020 11:22 AM        Culture:   Obtained,   Culture Results show:  Recent Results (from the past 720 hour(s))   CULTURE WOUND W/ GRAM STAIN    Collection Time: 04/22/20 12:03 PM    Specimen: Wound   Result Value Ref Range    Significant Indicator POS (POS)     Source WND     Site Left Leg     Culture Result - (A)     Gram Stain Result Moderate WBCs.  No organisms seen.       Culture Result (A)      Pseudomonas aeruginosa  Light growth  P.aeruginosa may develop resistance during prolonged therapy  with all antibiotics. Isolates that are initially susceptible  may become resistant within three to four days after  initiation of therapy. Testing of repeat isolates may be  warranted.      Culture Result (A)      Beta Hemolytic Streptococcus group A  Rare growth         Susceptibility    Pseudomonas aeruginosa - SHANON     Ceftazidime <=1 Sensitive mcg/mL     Ciprofloxacin >2 Resistant mcg/mL     Cefepime 4 Sensitive mcg/mL     Amikacin <=16 Sensitive mcg/mL     Gentamicin <=4 Sensitive mcg/mL     Tobramycin <=4 Sensitive mcg/mL     Meropenem <=1 Sensitive mcg/mL     Pip/Tazobactam 64 Sensitive mcg/mL     INTERVENTIONS BY WOUND TEAM:   Dressing removed from lateral wound cleansed with wound cleanser. Marylou wound prepped with benzoin and paste ring. Veraflo sponge applied to wound bed and secured with drape, trac pad applied. Veraflo therapy initiated, instilling 20ml of NS soaking for 4 minutes every 1 hours. Medial wound vac removed and cleansed with wound cleanser. Marylou wound prepped with skin prep and drape, regular vac sponge to wound bed. Secured with drape. Trac pad applied to lateral side and seal obtained and therapy resumed at 125mmHg Y connected. L leg then wrapped in 2 layer compression.     Interdisciplinary consultation: Patient, Bedside RN, [employer] Rep Keisha    EVALUATION: Patient admitted with LLE redness and swelling, venous ulcers present, developed an abcess and gas trapped underneath surface, multiple I&D's and Necrotic tissue developed. Had to be excised on the lateral leg down to the fascia. LLE now presents with Lateral surgical incision wound and venous ulcers to the anterior leg/medial/posterior ankle.  Venous ulcers will benefit from NPWT to manage drainage, and encourage granulation tissue. Will continue regular NPWT on venous ulcer sites. Lateral incisional wound Veraflo NPWT to maintain a moist wound bed, manage drainage and encourage granulation.     Goals: Steady decrease in wound area and depth weekly.    NURSING PLAN OF CARE ORDERS (X):    Dressing changes: See Dressing Care orders: X  Skin care: See Skin Care orders: X  Rectal tube care: See Rectal Tube Care orders:   Other orders:    WOUND TEAM PLAN OF CARE:   Dressing changes by wound team:          Follow up 1-2 times weekly:               Follow up 3 times weekly:                NPWT change 3 times weekly:   X  Follow up as needed:       Other (explain):     Anticipated discharge plans:   LTACH:    X    SNF/Rehab:   X               Home Care:           Outpatient Wound Center:            Self Care:

## 2020-05-16 LAB
ABO GROUP BLD: NORMAL
ANION GAP SERPL CALC-SCNC: 12 MMOL/L (ref 7–16)
BARCODED ABORH UBTYP: 6200
BARCODED PRD CODE UBPRD: NORMAL
BARCODED UNIT NUM UBUNT: NORMAL
BASOPHILS # BLD AUTO: 0.7 % (ref 0–1.8)
BASOPHILS # BLD AUTO: 1 % (ref 0–1.8)
BASOPHILS # BLD: 0.04 K/UL (ref 0–0.12)
BASOPHILS # BLD: 0.06 K/UL (ref 0–0.12)
BLD GP AB SCN SERPL QL: NORMAL
BUN SERPL-MCNC: 17 MG/DL (ref 8–22)
CALCIUM SERPL-MCNC: 8 MG/DL (ref 8.5–10.5)
CHLORIDE SERPL-SCNC: 104 MMOL/L (ref 96–112)
CO2 SERPL-SCNC: 24 MMOL/L (ref 20–33)
COMPONENT R 8504R: NORMAL
CREAT SERPL-MCNC: 0.94 MG/DL (ref 0.5–1.4)
EOSINOPHIL # BLD AUTO: 0.16 K/UL (ref 0–0.51)
EOSINOPHIL # BLD AUTO: 0.22 K/UL (ref 0–0.51)
EOSINOPHIL NFR BLD: 2.8 % (ref 0–6.9)
EOSINOPHIL NFR BLD: 3.7 % (ref 0–6.9)
ERYTHROCYTE [DISTWIDTH] IN BLOOD BY AUTOMATED COUNT: 51.8 FL (ref 35.9–50)
ERYTHROCYTE [DISTWIDTH] IN BLOOD BY AUTOMATED COUNT: 53.1 FL (ref 35.9–50)
GLUCOSE SERPL-MCNC: 97 MG/DL (ref 65–99)
HCT VFR BLD AUTO: 21 % (ref 42–52)
HCT VFR BLD AUTO: 25.1 % (ref 42–52)
HGB BLD-MCNC: 6.8 G/DL (ref 14–18)
HGB BLD-MCNC: 8.1 G/DL (ref 14–18)
IMM GRANULOCYTES # BLD AUTO: 0.05 K/UL (ref 0–0.11)
IMM GRANULOCYTES # BLD AUTO: 0.09 K/UL (ref 0–0.11)
IMM GRANULOCYTES NFR BLD AUTO: 0.9 % (ref 0–0.9)
IMM GRANULOCYTES NFR BLD AUTO: 1.5 % (ref 0–0.9)
LYMPHOCYTES # BLD AUTO: 0.72 K/UL (ref 1–4.8)
LYMPHOCYTES # BLD AUTO: 1.1 K/UL (ref 1–4.8)
LYMPHOCYTES NFR BLD: 12.2 % (ref 22–41)
LYMPHOCYTES NFR BLD: 19.2 % (ref 22–41)
MAGNESIUM SERPL-MCNC: 1.8 MG/DL (ref 1.5–2.5)
MCH RBC QN AUTO: 31.1 PG (ref 27–33)
MCH RBC QN AUTO: 31.1 PG (ref 27–33)
MCHC RBC AUTO-ENTMCNC: 32.4 G/DL (ref 33.7–35.3)
MCHC RBC AUTO-ENTMCNC: 32.4 G/DL (ref 33.7–35.3)
MCV RBC AUTO: 95.8 FL (ref 81.4–97.8)
MCV RBC AUTO: 95.9 FL (ref 81.4–97.8)
MONOCYTES # BLD AUTO: 0.65 K/UL (ref 0–0.85)
MONOCYTES # BLD AUTO: 0.67 K/UL (ref 0–0.85)
MONOCYTES NFR BLD AUTO: 11.3 % (ref 0–13.4)
MONOCYTES NFR BLD AUTO: 11.4 % (ref 0–13.4)
NEUTROPHILS # BLD AUTO: 3.73 K/UL (ref 1.82–7.42)
NEUTROPHILS # BLD AUTO: 4.12 K/UL (ref 1.82–7.42)
NEUTROPHILS NFR BLD: 65.1 % (ref 44–72)
NEUTROPHILS NFR BLD: 70.2 % (ref 44–72)
NRBC # BLD AUTO: 0.04 K/UL
NRBC # BLD AUTO: 0.04 K/UL
NRBC BLD-RTO: 0.7 /100 WBC
NRBC BLD-RTO: 0.7 /100 WBC
PHOSPHATE SERPL-MCNC: 2.6 MG/DL (ref 2.5–4.5)
PLATELET # BLD AUTO: 143 K/UL (ref 164–446)
PLATELET # BLD AUTO: 144 K/UL (ref 164–446)
PMV BLD AUTO: 10 FL (ref 9–12.9)
PMV BLD AUTO: 9.3 FL (ref 9–12.9)
POTASSIUM SERPL-SCNC: 4.4 MMOL/L (ref 3.6–5.5)
PRODUCT TYPE UPROD: NORMAL
RBC # BLD AUTO: 2.19 M/UL (ref 4.7–6.1)
RBC # BLD AUTO: 2.64 M/UL (ref 4.7–6.1)
RH BLD: NORMAL
SODIUM SERPL-SCNC: 140 MMOL/L (ref 135–145)
UNIT STATUS USTAT: NORMAL
WBC # BLD AUTO: 5.7 K/UL (ref 4.8–10.8)
WBC # BLD AUTO: 5.9 K/UL (ref 4.8–10.8)

## 2020-05-16 PROCEDURE — 99233 SBSQ HOSP IP/OBS HIGH 50: CPT | Performed by: INTERNAL MEDICINE

## 2020-05-16 PROCEDURE — 700111 HCHG RX REV CODE 636 W/ 250 OVERRIDE (IP): Performed by: INTERNAL MEDICINE

## 2020-05-16 PROCEDURE — 700102 HCHG RX REV CODE 250 W/ 637 OVERRIDE(OP): Performed by: FAMILY MEDICINE

## 2020-05-16 PROCEDURE — A9270 NON-COVERED ITEM OR SERVICE: HCPCS | Performed by: FAMILY MEDICINE

## 2020-05-16 PROCEDURE — A9270 NON-COVERED ITEM OR SERVICE: HCPCS | Performed by: HOSPITALIST

## 2020-05-16 PROCEDURE — 83735 ASSAY OF MAGNESIUM: CPT

## 2020-05-16 PROCEDURE — 700111 HCHG RX REV CODE 636 W/ 250 OVERRIDE (IP): Performed by: FAMILY MEDICINE

## 2020-05-16 PROCEDURE — A9270 NON-COVERED ITEM OR SERVICE: HCPCS | Performed by: INTERNAL MEDICINE

## 2020-05-16 PROCEDURE — 86923 COMPATIBILITY TEST ELECTRIC: CPT

## 2020-05-16 PROCEDURE — P9016 RBC LEUKOCYTES REDUCED: HCPCS

## 2020-05-16 PROCEDURE — 770021 HCHG ROOM/CARE - ISO PRIVATE

## 2020-05-16 PROCEDURE — 700102 HCHG RX REV CODE 250 W/ 637 OVERRIDE(OP): Performed by: NURSE PRACTITIONER

## 2020-05-16 PROCEDURE — 36430 TRANSFUSION BLD/BLD COMPNT: CPT

## 2020-05-16 PROCEDURE — 80048 BASIC METABOLIC PNL TOTAL CA: CPT

## 2020-05-16 PROCEDURE — 84100 ASSAY OF PHOSPHORUS: CPT

## 2020-05-16 PROCEDURE — 306263 VAC CANNISTER W/GEL 500ML: Performed by: INTERNAL MEDICINE

## 2020-05-16 PROCEDURE — 86850 RBC ANTIBODY SCREEN: CPT

## 2020-05-16 PROCEDURE — 86900 BLOOD TYPING SEROLOGIC ABO: CPT

## 2020-05-16 PROCEDURE — 700102 HCHG RX REV CODE 250 W/ 637 OVERRIDE(OP): Performed by: INTERNAL MEDICINE

## 2020-05-16 PROCEDURE — 85025 COMPLETE CBC W/AUTO DIFF WBC: CPT | Mod: 91

## 2020-05-16 PROCEDURE — 36415 COLL VENOUS BLD VENIPUNCTURE: CPT

## 2020-05-16 PROCEDURE — 700105 HCHG RX REV CODE 258: Performed by: INTERNAL MEDICINE

## 2020-05-16 PROCEDURE — 86901 BLOOD TYPING SEROLOGIC RH(D): CPT

## 2020-05-16 PROCEDURE — A9270 NON-COVERED ITEM OR SERVICE: HCPCS | Performed by: NURSE PRACTITIONER

## 2020-05-16 PROCEDURE — 700102 HCHG RX REV CODE 250 W/ 637 OVERRIDE(OP): Performed by: HOSPITALIST

## 2020-05-16 RX ADMIN — FERROUS GLUCONATE 324 MG: 324 TABLET ORAL at 08:01

## 2020-05-16 RX ADMIN — ONDANSETRON 4 MG: 2 INJECTION INTRAMUSCULAR; INTRAVENOUS at 08:41

## 2020-05-16 RX ADMIN — SERTRALINE HYDROCHLORIDE 50 MG: 50 TABLET ORAL at 05:51

## 2020-05-16 RX ADMIN — LOSARTAN POTASSIUM 25 MG: 25 TABLET, FILM COATED ORAL at 05:51

## 2020-05-16 RX ADMIN — AMLODIPINE BESYLATE 10 MG: 10 TABLET ORAL at 05:50

## 2020-05-16 RX ADMIN — LINEZOLID 600 MG: 600 TABLET, FILM COATED ORAL at 18:22

## 2020-05-16 RX ADMIN — OXYCODONE 10 MG: 5 TABLET ORAL at 05:51

## 2020-05-16 RX ADMIN — DOCUSATE SODIUM 50 MG AND SENNOSIDES 8.6 MG 2 TABLET: 8.6; 5 TABLET, FILM COATED ORAL at 05:50

## 2020-05-16 RX ADMIN — DOCUSATE SODIUM 50 MG AND SENNOSIDES 8.6 MG 2 TABLET: 8.6; 5 TABLET, FILM COATED ORAL at 18:22

## 2020-05-16 RX ADMIN — MEROPENEM 500 MG: 500 INJECTION, POWDER, FOR SOLUTION INTRAVENOUS at 01:37

## 2020-05-16 RX ADMIN — MORPHINE SULFATE 4 MG: 4 INJECTION INTRAVENOUS at 16:22

## 2020-05-16 RX ADMIN — ONDANSETRON 4 MG: 2 INJECTION INTRAMUSCULAR; INTRAVENOUS at 14:20

## 2020-05-16 RX ADMIN — MEROPENEM 500 MG: 500 INJECTION, POWDER, FOR SOLUTION INTRAVENOUS at 19:43

## 2020-05-16 RX ADMIN — MEROPENEM 500 MG: 500 INJECTION, POWDER, FOR SOLUTION INTRAVENOUS at 14:12

## 2020-05-16 RX ADMIN — OXYCODONE 10 MG: 5 TABLET ORAL at 18:22

## 2020-05-16 RX ADMIN — MEROPENEM 500 MG: 500 INJECTION, POWDER, FOR SOLUTION INTRAVENOUS at 08:01

## 2020-05-16 RX ADMIN — ATORVASTATIN CALCIUM 40 MG: 40 TABLET, FILM COATED ORAL at 05:51

## 2020-05-16 RX ADMIN — OXYCODONE 10 MG: 5 TABLET ORAL at 01:37

## 2020-05-16 RX ADMIN — LINEZOLID 600 MG: 600 TABLET, FILM COATED ORAL at 05:50

## 2020-05-16 ASSESSMENT — ENCOUNTER SYMPTOMS
BLURRED VISION: 0
DIARRHEA: 0
CONSTIPATION: 0
HEADACHES: 0
NERVOUS/ANXIOUS: 0
ABDOMINAL PAIN: 0
WEAKNESS: 0
DEPRESSION: 0
CHILLS: 0
SPUTUM PRODUCTION: 0
SORE THROAT: 0
DIZZINESS: 0
NAUSEA: 1
COUGH: 0
FEVER: 0
SINUS PAIN: 0
PALPITATIONS: 0
SHORTNESS OF BREATH: 0
FALLS: 0
WHEEZING: 0
HEMOPTYSIS: 0
VOMITING: 0

## 2020-05-16 NOTE — PROGRESS NOTES
[facility] Medicine Daily Progress Note    Date of Service  5/16/2020    Chief Complaint  64 y.o. male admitted 4/16/2020 with left leg redness and swelling    Hospital Course    Mr. Goran Chavez is a 64 y.o. male with past medical history significant for venous stasis dermatitis and stasis ulcers of bilateral lower extremities who presented on 4/16/2020 with left leg redness and swelling.  Patient was previously followed by wound care as an outpatient but was discharged when ulcers had resolved, but ulcers came back 1 week prior to presentation.  Patient has had multiple incision and drainages with a wound VAC in place.  Intraoperative wound culture grew Pseudomonas and group A strep.  Current plans for skin graft.        Interval Problem Update  Patient was seen and examined at bedside.  I have personally reviewed vitals, labs, and imaging.    5/12.  Patient has been hypertensive.  Patient denies fever, chills, chest pain, shortness of breath.  Patient does report nausea but is able to keep food down and has good p.o. intake.  5/13.  Blood pressure improved after started on losartan addition to lidocaine.  Afebrile.  Patient with pain in left lower extremity is well controlled.  Denies fever, chills, chest pain, shortness of breath.  After evaluation by wound care tenderness still visible and they would like more granulation tissue prior to skin graft.  Skin graft has again been postponed until 5/18.  Wound VAC was changed today.  Does complain of nausea but is adequate p.o. intake and keeping food down.  Platelets slightly trended down to 144 we will monitor  5/14.  Afebrile with stable vitals.  Hemoglobin 7.  No acute events overnight.  Still reports mild nausea.  Tolerating PO intake  5/15.  Afebrile with stable vitals.  Switched from Zosyn to Meropenem yesterday per ID.  Platelets stable on Linezolid.  Denies fever, chills, chest pain, shortness of breath.  Endorses chronic nausea  5/16.  Afebrile with  stable vitals.  Hgb 6.8 this morning.  Transfused this morning.  Denies fever, chills, chest pain, SOB.  No signs of active bleeding.  Chronic nausea is well controlled.    Consultants/Specialty  Infectious disease  LPS  Ortho    Code Status  Full    Disposition  Pending medical and surgical clearance    Review of Systems  Review of Systems   Constitutional: Negative for chills and fever.   HENT: Negative for congestion, sinus pain and sore throat.    Eyes: Negative for blurred vision.   Respiratory: Negative for cough, hemoptysis, sputum production, shortness of breath and wheezing.    Cardiovascular: Negative for chest pain, palpitations and leg swelling.   Gastrointestinal: Positive for nausea. Negative for abdominal pain, constipation, diarrhea and vomiting.   Genitourinary: Negative for dysuria, frequency and urgency.   Musculoskeletal: Negative for falls.   Skin: Positive for rash (Left lower extremity).   Neurological: Negative for dizziness, weakness and headaches.   Psychiatric/Behavioral: Negative for depression. The patient is not nervous/anxious.    All other systems reviewed and are negative.       Physical Exam  Temp:  [36.6 °C (97.9 °F)-37.1 °C (98.8 °F)] 36.9 °C (98.4 °F)  Pulse:  [66-79] 66  Resp:  [18] 18  BP: (118-161)/(70-98) 138/98  SpO2:  [89 %-99 %] 99 %    Physical Exam  Vitals signs and nursing note reviewed.   Constitutional:       General: He is not in acute distress.     Appearance: Normal appearance. He is normal weight.   HENT:      Head: Normocephalic and atraumatic.      Nose: Nose normal.      Mouth/Throat:      Mouth: Mucous membranes are moist.      Pharynx: Oropharynx is clear.   Eyes:      Extraocular Movements: Extraocular movements intact.      Conjunctiva/sclera: Conjunctivae normal.   Neck:      Musculoskeletal: Normal range of motion and neck supple.   Cardiovascular:      Rate and Rhythm: Normal rate and regular rhythm.      Pulses: Normal pulses.      Heart sounds: Normal  heart sounds. No murmur. No friction rub. No gallop.    Pulmonary:      Effort: Pulmonary effort is normal. No respiratory distress.      Breath sounds: Normal breath sounds. No wheezing or rales.   Chest:      Chest wall: No tenderness.   Abdominal:      General: Abdomen is flat. Bowel sounds are normal. There is no distension.      Palpations: Abdomen is soft. There is no mass.      Tenderness: There is no abdominal tenderness. There is no guarding.   Musculoskeletal: Normal range of motion.      Comments: Wound VAC on left lower extremity.   Skin:     General: Skin is warm.      Capillary Refill: Capillary refill takes less than 2 seconds.   Neurological:      General: No focal deficit present.      Mental Status: He is alert and oriented to person, place, and time. Mental status is at baseline.      Cranial Nerves: No cranial nerve deficit.      Motor: No weakness.   Psychiatric:         Mood and Affect: Mood normal.         Behavior: Behavior normal.         Thought Content: Thought content normal.         Judgment: Judgment normal.         Fluids    Intake/Output Summary (Last 24 hours) at 5/16/2020 0933  Last data filed at 5/16/2020 0400  Gross per 24 hour   Intake --   Output 1500 ml   Net -1500 ml       Laboratory  Recent Labs     05/14/20  0044 05/15/20  0052 05/16/20  0110   WBC 6.2 5.6 5.7   RBC 2.26* 2.30* 2.19*   HEMOGLOBIN 7.0* 7.1* 6.8*   HEMATOCRIT 22.0* 22.2* 21.0*   MCV 97.3 96.5 95.9   MCH 31.0 30.9 31.1   MCHC 31.8* 32.0* 32.4*   RDW 50.8* 51.7* 51.8*   PLATELETCT 143* 143* 143*   MPV 8.9* 9.1 9.3     Recent Labs     05/15/20  0052 05/16/20  0110   SODIUM 139 140   POTASSIUM 4.5 4.4   CHLORIDE 104 104   CO2 20 24   GLUCOSE 101* 97   BUN 19 17   CREATININE 1.09 0.94   CALCIUM 8.5 8.0*                   Imaging  IR-US GUIDED PIV   Final Result    Ultrasound-guided PERIPHERAL IV INSERTION performed by    qualified nursing staff as above.            CT-EXTREMITY, LOWER WITH LEFT   Final Result          1. No evidence of osteomyelitis.   2. Cellulitis and soft tissue organs of the left lower leg. Subcutaneous air without drainable abscess.   3. Suspected pes planovalgus deformity. Multifocal osteoarthrosis of the midfoot.      US-RENAL   Final Result      1.  Right kidney appears smaller than left kidney which could be due to atrophy.      2.  No hydronephrosis.      3.  Incidentally noted small right pleural effusion.      CT-EXTREMITY, LOWER WITH LEFT   Final Result      1.  Extensive subcutaneous edema with overlying skin thickening. Inflammation extends along the fascial planes with curvilinear fluid superficial to the gastrocnemius muscle. No soft tissue gas to suggest necrotizing fasciitis.      2.  Osteopenia           Assessment/Plan  * Venous stasis ulcer of left lower extremity (HCC)- (present on admission)  Assessment & Plan  With surrounding cellulitis improving. Arterial US negative for arterial insufficiency.  - s/p washout on 4/22, 4/24, 4/27 and 5/4  - ID and orthopedic surgery following, appreciate recs  - continue with linezolid and Meropenem, per ID recs  - abx to be continued x7 days post skin graft  - continue with wound care; per LPS, tendon still visible so recommending surgery for skin graft be postponed now until 5/18    Hyperkalemia  Assessment & Plan  Resolved.  Continue to monitor    Venous stasis dermatitis of both lower extremities- (present on admission)  Assessment & Plan  Chronic venous stasis of bilateral LE.   - management as above    CKD (chronic kidney disease) stage 3, GFR 30-59 ml/min ([facility])- (present on admission)  Assessment & Plan  Acute on chronic resolved. Cr now normalized.   - avoid nephrotoxic agents; renally dose medications  Monitor kidney function and urine output    Essential hypertension- (present on admission)  Assessment & Plan  Intermittently hypertensive.   - continue home amlodipine.  Started on losartan during admission  - PRN  hydralazine    Constipation  Assessment & Plan  Improving.   - continue bowel regimen    Normocytic anemia- (present on admission)  Assessment & Plan  Likely anemia of chronic disease and in the setting of slow blood loss from his LE wound, which has now improved.   No active signs of bleeding.    Patient is hemodynamically stable and asymptomatic  Will continue to trend with CBC  s/p 1U PRBC on 4/29, 5/5, 5/16  Continue iron supplementation  Transfuse to maintain hemoglobin greater than 7.  Results from last 7 days   Lab Units 05/16/20  1003 05/16/20  0110 05/15/20  0052 05/14/20  0044   HGB 1503 g/dL 8.1* 6.8* 7.1* 7.0*   HCT 1504 % 25.1* 21.0* 22.2* 22.0*   MCV 1505 fL 95.8 95.9 96.5 97.3     Folate -Folic Acid   Date Value Ref Range Status   12/28/2019 19.6 >4.0 ng/mL Final     Vitamin B12 -True Cobalamin   Date Value Ref Range Status   04/22/2020 1374 (H) 211 - 911 pg/mL Final     Reticulocyte Count   Date Value Ref Range Status   12/28/2019 2.1 0.8 - 2.1 % Final     Retic, Absolute   Date Value Ref Range Status   12/28/2019 0.08 (H) 0.04 - 0.06 M/uL Final     Imm. Reticulocyte Fraction   Date Value Ref Range Status   12/28/2019 24.4 (H) 9.3 - 17.4 % Final     Retic Hgb Equivalent   Date Value Ref Range Status   12/28/2019 29.2 29.0 - 35.0 pg/cell Final      Ferritin   Date Value Ref Range Status   12/28/2019 77.7 22.0 - 322.0 ng/mL Final     Iron   Date Value Ref Range Status   04/22/2020 27 (L) 50 - 180 ug/dL Final     Total Iron Binding   Date Value Ref Range Status   04/22/2020 131 (L) 250 - 450 ug/dL Final     % Saturation   Date Value Ref Range Status   04/22/2020 21 15 - 55 % Final         Methamphetamine abuse (HCC)- (present on admission)  Assessment & Plan  Positive UDS for amphetamine, oxycodone and marijuana.  - continued counseling and education  Not candidate for outpatient infusion for antibiotics    Alcohol abuse- (present on admission)  Assessment & Plan  - out of withdrawal window  - continued  counseling and education  - correct electrolytes PRN    Anxiety- (present on admission)  Assessment & Plan  Stable.   - continue home sertraline  Monitor closely for serotonin syndrome as patient is also on linezolid    Gastrointestinal prophylaxis: The patient has no risk factors for developing gastric ulcers or gastritis.  As the patient is tolerating oral intake, GI prophylaxis is not indicated at this time.  Antibiotics: IV Meropenem, linezolid  Diet Order Regular  Code status: Full  Prognosis: Guarded  Risk: The Patient is at HIGH risk for complications and decompensation secondary to his multiple cormorbidities including left lower extremity cellulitis complicated by poor healing from venous stasis dermatitis and venous stasis ulcers requiring IV antibiotics, multiple incision and drainage with washout, and wound VAC.  Hypertension, chronic kidney disease, anemia requiring blood transfusions    I have personally reviewed notes, labs, vitals, imaging.  I discussed the plan of care with bedside RN as well as on multidisciplinary rounds     I have performed a physical exam and reviewed and updated ROS and Plan today (5/16/2020). In review of yesterday's note (5/15/2020), there are no changes except as documented above.       VTE prophylaxis: SCD on right leg.  Avoid anticoagulation in the setting of anemia requiring transfusions

## 2020-05-16 NOTE — PROGRESS NOTES
Blood transfusion completed.  No reaction noted. Cbc ordered. Aaox4.  Iv intact. Ate very little of diet. Med for nausea. VS stabel.  Voiding qs.  Wound vac is functioning well.

## 2020-05-16 NOTE — CARE PLAN
Problem: Safety  Goal: Will remain free from injury  Outcome: PROGRESSING AS EXPECTED  Intervention: Collaborate with Interdisciplinary Team for safe transfer and mobilization techniques  Note: A/Ox4. Pt is a 1 person assist with FWW to transfer. Call light and personal belongings within reach, able to make needs known. No impulsive behavior noted so far during shift. Hourly rounding, fall precautions, safety maintained. WCTM       Problem: Infection  Goal: Will remain free from infection  Outcome: PROGRESSING AS EXPECTED  Intervention: Implement standard precautions and perform hand washing before and after patient contact  Note: Contact precautions maintained. ABX, afebrile. No new s/sx infection. WCTM      Problem: Pain Management  Goal: Pain level will decrease to patient's comfort goal  Outcome: PROGRESSING AS EXPECTED  Intervention: Follow pain managment plan developed in collaboration with patient and Interdisciplinary Team  Note: PRN Oxy given throughout shift with relief. PRN Morphine given before wound care. Pt repositions self for comfort. WCTM     Problem: Skin Integrity  Goal: Risk for impaired skin integrity will decrease  Outcome: PROGRESSING AS EXPECTED  Intervention: Assess and monitor skin integrity, appearance and/or temperature  Note: See LDA   Wound vac Veraflow to LLE, changed by WOCN today. No new signs skin breakdown. Skin kept clean and dry. WCTM

## 2020-05-16 NOTE — PROGRESS NOTES
Pt resting in bed.  Reviewed POC including safety, mobility, pain management, medication administration, DVT prophylaxis, and discharge. Call light within reach. Pt calls appropriately. Hourly rounding in place. Pt has no needs or concerns at this time.

## 2020-05-16 NOTE — CARE PLAN
Problem: Bowel/Gastric:  Goal: Normal bowel function is maintained or improved  Outcome: PROGRESSING AS EXPECTED  Note: Pt had a bowel movement 05/14. Pt reports bowel motility within baseline. Bowel sounds normoactive in all four quadrants.       Problem: Mobility  Goal: Risk for activity intolerance will decrease  Outcome: PROGRESSING SLOWER THAN EXPECTED  Note: Patient refusing ambulation despite education. Patient reports pain. Mobility education provided.

## 2020-05-17 LAB
ANION GAP SERPL CALC-SCNC: 9 MMOL/L (ref 7–16)
BASOPHILS # BLD AUTO: 0.8 % (ref 0–1.8)
BASOPHILS # BLD: 0.05 K/UL (ref 0–0.12)
BUN SERPL-MCNC: 14 MG/DL (ref 8–22)
CALCIUM SERPL-MCNC: 8.5 MG/DL (ref 8.5–10.5)
CHLORIDE SERPL-SCNC: 100 MMOL/L (ref 96–112)
CO2 SERPL-SCNC: 25 MMOL/L (ref 20–33)
COVID ORDER STATUS COVID19: NORMAL
CREAT SERPL-MCNC: 0.86 MG/DL (ref 0.5–1.4)
EOSINOPHIL # BLD AUTO: 0.24 K/UL (ref 0–0.51)
EOSINOPHIL NFR BLD: 4.1 % (ref 0–6.9)
ERYTHROCYTE [DISTWIDTH] IN BLOOD BY AUTOMATED COUNT: 51.6 FL (ref 35.9–50)
GLUCOSE SERPL-MCNC: 108 MG/DL (ref 65–99)
HCT VFR BLD AUTO: 24.4 % (ref 42–52)
HGB BLD-MCNC: 7.9 G/DL (ref 14–18)
IMM GRANULOCYTES # BLD AUTO: 0.1 K/UL (ref 0–0.11)
IMM GRANULOCYTES NFR BLD AUTO: 1.7 % (ref 0–0.9)
LYMPHOCYTES # BLD AUTO: 1.03 K/UL (ref 1–4.8)
LYMPHOCYTES NFR BLD: 17.5 % (ref 22–41)
MAGNESIUM SERPL-MCNC: 1.8 MG/DL (ref 1.5–2.5)
MCH RBC QN AUTO: 30.6 PG (ref 27–33)
MCHC RBC AUTO-ENTMCNC: 32.4 G/DL (ref 33.7–35.3)
MCV RBC AUTO: 94.6 FL (ref 81.4–97.8)
MONOCYTES # BLD AUTO: 0.71 K/UL (ref 0–0.85)
MONOCYTES NFR BLD AUTO: 12 % (ref 0–13.4)
NEUTROPHILS # BLD AUTO: 3.77 K/UL (ref 1.82–7.42)
NEUTROPHILS NFR BLD: 63.9 % (ref 44–72)
NRBC # BLD AUTO: 0.03 K/UL
NRBC BLD-RTO: 0.5 /100 WBC
PHOSPHATE SERPL-MCNC: 2.1 MG/DL (ref 2.5–4.5)
PLATELET # BLD AUTO: 149 K/UL (ref 164–446)
PMV BLD AUTO: 9.2 FL (ref 9–12.9)
POTASSIUM SERPL-SCNC: 4.3 MMOL/L (ref 3.6–5.5)
RBC # BLD AUTO: 2.58 M/UL (ref 4.7–6.1)
SARS-COV-2 RNA RESP QL NAA+PROBE: NOTDETECTED
SODIUM SERPL-SCNC: 134 MMOL/L (ref 135–145)
SPECIMEN SOURCE: NORMAL
WBC # BLD AUTO: 5.9 K/UL (ref 4.8–10.8)

## 2020-05-17 PROCEDURE — 36415 COLL VENOUS BLD VENIPUNCTURE: CPT

## 2020-05-17 PROCEDURE — 700105 HCHG RX REV CODE 258: Performed by: INTERNAL MEDICINE

## 2020-05-17 PROCEDURE — 700111 HCHG RX REV CODE 636 W/ 250 OVERRIDE (IP): Performed by: INTERNAL MEDICINE

## 2020-05-17 PROCEDURE — 84100 ASSAY OF PHOSPHORUS: CPT

## 2020-05-17 PROCEDURE — 700102 HCHG RX REV CODE 250 W/ 637 OVERRIDE(OP): Performed by: HOSPITALIST

## 2020-05-17 PROCEDURE — 700102 HCHG RX REV CODE 250 W/ 637 OVERRIDE(OP): Performed by: FAMILY MEDICINE

## 2020-05-17 PROCEDURE — A9270 NON-COVERED ITEM OR SERVICE: HCPCS | Performed by: FAMILY MEDICINE

## 2020-05-17 PROCEDURE — 99232 SBSQ HOSP IP/OBS MODERATE 35: CPT | Performed by: INTERNAL MEDICINE

## 2020-05-17 PROCEDURE — A9270 NON-COVERED ITEM OR SERVICE: HCPCS | Performed by: NURSE PRACTITIONER

## 2020-05-17 PROCEDURE — 700102 HCHG RX REV CODE 250 W/ 637 OVERRIDE(OP): Performed by: NURSE PRACTITIONER

## 2020-05-17 PROCEDURE — 80048 BASIC METABOLIC PNL TOTAL CA: CPT

## 2020-05-17 PROCEDURE — 306263 VAC CANNISTER W/GEL 500ML: Performed by: INTERNAL MEDICINE

## 2020-05-17 PROCEDURE — A9270 NON-COVERED ITEM OR SERVICE: HCPCS | Performed by: HOSPITALIST

## 2020-05-17 PROCEDURE — 700111 HCHG RX REV CODE 636 W/ 250 OVERRIDE (IP): Performed by: FAMILY MEDICINE

## 2020-05-17 PROCEDURE — 85025 COMPLETE CBC W/AUTO DIFF WBC: CPT

## 2020-05-17 PROCEDURE — 700102 HCHG RX REV CODE 250 W/ 637 OVERRIDE(OP): Performed by: INTERNAL MEDICINE

## 2020-05-17 PROCEDURE — U0004 COV-19 TEST NON-CDC HGH THRU: HCPCS

## 2020-05-17 PROCEDURE — C9803 HOPD COVID-19 SPEC COLLECT: HCPCS | Performed by: ORTHOPAEDIC SURGERY

## 2020-05-17 PROCEDURE — A9270 NON-COVERED ITEM OR SERVICE: HCPCS | Performed by: INTERNAL MEDICINE

## 2020-05-17 PROCEDURE — 770021 HCHG ROOM/CARE - ISO PRIVATE

## 2020-05-17 PROCEDURE — 83735 ASSAY OF MAGNESIUM: CPT

## 2020-05-17 RX ADMIN — LOSARTAN POTASSIUM 25 MG: 25 TABLET, FILM COATED ORAL at 05:27

## 2020-05-17 RX ADMIN — PROMETHAZINE HYDROCHLORIDE 25 MG: 25 TABLET ORAL at 17:45

## 2020-05-17 RX ADMIN — FERROUS GLUCONATE 324 MG: 324 TABLET ORAL at 08:12

## 2020-05-17 RX ADMIN — MEROPENEM 500 MG: 500 INJECTION, POWDER, FOR SOLUTION INTRAVENOUS at 08:12

## 2020-05-17 RX ADMIN — DIBASIC SODIUM PHOSPHATE, MONOBASIC POTASSIUM PHOSPHATE AND MONOBASIC SODIUM PHOSPHATE 2 TABLET: 852; 155; 130 TABLET ORAL at 13:15

## 2020-05-17 RX ADMIN — SERTRALINE HYDROCHLORIDE 50 MG: 50 TABLET ORAL at 05:27

## 2020-05-17 RX ADMIN — DIBASIC SODIUM PHOSPHATE, MONOBASIC POTASSIUM PHOSPHATE AND MONOBASIC SODIUM PHOSPHATE 2 TABLET: 852; 155; 130 TABLET ORAL at 17:35

## 2020-05-17 RX ADMIN — MEROPENEM 500 MG: 500 INJECTION, POWDER, FOR SOLUTION INTRAVENOUS at 02:23

## 2020-05-17 RX ADMIN — OXYCODONE 10 MG: 5 TABLET ORAL at 23:32

## 2020-05-17 RX ADMIN — ONDANSETRON 4 MG: 4 TABLET, ORALLY DISINTEGRATING ORAL at 15:56

## 2020-05-17 RX ADMIN — DOCUSATE SODIUM 50 MG AND SENNOSIDES 8.6 MG 2 TABLET: 8.6; 5 TABLET, FILM COATED ORAL at 05:27

## 2020-05-17 RX ADMIN — Medication 400 MG: at 13:16

## 2020-05-17 RX ADMIN — OXYCODONE 10 MG: 5 TABLET ORAL at 08:12

## 2020-05-17 RX ADMIN — ATORVASTATIN CALCIUM 40 MG: 40 TABLET, FILM COATED ORAL at 05:27

## 2020-05-17 RX ADMIN — MEROPENEM 500 MG: 500 INJECTION, POWDER, FOR SOLUTION INTRAVENOUS at 13:16

## 2020-05-17 RX ADMIN — AMLODIPINE BESYLATE 10 MG: 10 TABLET ORAL at 05:27

## 2020-05-17 RX ADMIN — OXYCODONE 10 MG: 5 TABLET ORAL at 17:35

## 2020-05-17 RX ADMIN — DOCUSATE SODIUM 50 MG AND SENNOSIDES 8.6 MG 2 TABLET: 8.6; 5 TABLET, FILM COATED ORAL at 17:34

## 2020-05-17 RX ADMIN — OXYCODONE 10 MG: 5 TABLET ORAL at 13:19

## 2020-05-17 RX ADMIN — OXYCODONE 10 MG: 5 TABLET ORAL at 02:23

## 2020-05-17 ASSESSMENT — ENCOUNTER SYMPTOMS
BLURRED VISION: 0
VOMITING: 0
FEVER: 0
DIZZINESS: 0
NAUSEA: 1
WEAKNESS: 0
WHEEZING: 0
HEMOPTYSIS: 0
DIARRHEA: 0
COUGH: 0
CONSTIPATION: 0
CHILLS: 0
DEPRESSION: 0
SINUS PAIN: 0
SORE THROAT: 0
FALLS: 0
HEADACHES: 0
SHORTNESS OF BREATH: 0
SPUTUM PRODUCTION: 0
PALPITATIONS: 0
NERVOUS/ANXIOUS: 0
ABDOMINAL PAIN: 0

## 2020-05-17 NOTE — PROGRESS NOTES
Report received from NOC RNNAHUN. Assumed care of patient at 0710. Patient is A&Ox4, on room air. Contact ISO precautions in place - appropriate equipment and signage available. Plan of care discussed with patient and all questions answered. Patient reporting 6/10 pain to left lower extremity. Patient medicated per order - refer to MAR. Wound vac to left lower extremity present - cannister ordered to be changed. Fall precautions in place - bed is in low and locked position, call light/belongings within reach, bed alarm on. RN/CNA extensions provided, patient appropriately demonstrates how to call for assistance.

## 2020-05-17 NOTE — PROGRESS NOTES
[facility] Medicine Daily Progress Note    Date of Service  5/17/2020    Chief Complaint  64 y.o. male admitted 4/16/2020 with left leg redness and swelling    Hospital Course    Mr. Goran Chavez is a 64 y.o. male with past medical history significant for venous stasis dermatitis and stasis ulcers of bilateral lower extremities who presented on 4/16/2020 with left leg redness and swelling.  Patient was previously followed by wound care as an outpatient but was discharged when ulcers had resolved, but ulcers came back 1 week prior to presentation.  Patient has had multiple incision and drainages with a wound VAC in place.  Intraoperative wound culture grew Pseudomonas and group A strep.  Current plans for skin graft.        Interval Problem Update  Patient was seen and examined at bedside.  I have personally reviewed vitals, labs, and imaging.    5/12.  Patient has been hypertensive.  Patient denies fever, chills, chest pain, shortness of breath.  Patient does report nausea but is able to keep food down and has good p.o. intake.  5/13.  Blood pressure improved after started on losartan addition to lidocaine.  Afebrile.  Patient with pain in left lower extremity is well controlled.  Denies fever, chills, chest pain, shortness of breath.  After evaluation by wound care tenderness still visible and they would like more granulation tissue prior to skin graft.  Skin graft has again been postponed until 5/18.  Wound VAC was changed today.  Does complain of nausea but is adequate p.o. intake and keeping food down.  Platelets slightly trended down to 144 we will monitor  5/14.  Afebrile with stable vitals.  Hemoglobin 7.  No acute events overnight.  Still reports mild nausea.  Tolerating PO intake  5/15.  Afebrile with stable vitals.  Switched from Zosyn to Meropenem yesterday per ID.  Platelets stable on Linezolid.  Denies fever, chills, chest pain, shortness of breath.  Endorses chronic nausea  5/16.  Afebrile with  stable vitals.  Hgb 6.8 this morning.  Transfused this morning.  Denies fever, chills, chest pain, SOB.  No signs of active bleeding.  Chronic nausea is well controlled.  5/17.  Afebrile with stable vitals.  Hgb 7.9. Denies fever, chills, chest pain, SOB.  Replete Mag and Phos .  Reports blood transfusion yesterday made him nauseous.  Reassess wound and possible skin graft tomorrow.    Consultants/Specialty  Infectious disease  LPS  Ortho    Code Status  Full    Disposition  Pending medical and surgical clearance    Review of Systems  Review of Systems   Constitutional: Negative for chills and fever.   HENT: Negative for congestion, sinus pain and sore throat.    Eyes: Negative for blurred vision.   Respiratory: Negative for cough, hemoptysis, sputum production, shortness of breath and wheezing.    Cardiovascular: Negative for chest pain, palpitations and leg swelling.   Gastrointestinal: Positive for nausea. Negative for abdominal pain, constipation, diarrhea and vomiting.   Genitourinary: Negative for dysuria, frequency and urgency.   Musculoskeletal: Negative for falls.   Skin: Positive for rash (Left lower extremity).   Neurological: Negative for dizziness, weakness and headaches.   Psychiatric/Behavioral: Negative for depression. The patient is not nervous/anxious.    All other systems reviewed and are negative.       Physical Exam  Temp:  [36.8 °C (98.2 °F)-37.1 °C (98.7 °F)] 36.8 °C (98.2 °F)  Pulse:  [72-75] 74  Resp:  [18] 18  BP: (135-150)/(73-88) 143/73  SpO2:  [92 %-94 %] 94 %    Physical Exam  Vitals signs and nursing note reviewed.   Constitutional:       General: He is not in acute distress.     Appearance: Normal appearance. He is normal weight.   HENT:      Head: Normocephalic and atraumatic.      Nose: Nose normal.      Mouth/Throat:      Mouth: Mucous membranes are moist.      Pharynx: Oropharynx is clear.   Eyes:      Extraocular Movements: Extraocular movements intact.      Conjunctiva/sclera:  Conjunctivae normal.   Neck:      Musculoskeletal: Normal range of motion and neck supple.   Cardiovascular:      Rate and Rhythm: Normal rate and regular rhythm.      Pulses: Normal pulses.      Heart sounds: Normal heart sounds. No murmur. No friction rub. No gallop.    Pulmonary:      Effort: Pulmonary effort is normal. No respiratory distress.      Breath sounds: Normal breath sounds. No wheezing or rales.   Chest:      Chest wall: No tenderness.   Abdominal:      General: Abdomen is flat. Bowel sounds are normal. There is no distension.      Palpations: Abdomen is soft. There is no mass.      Tenderness: There is no abdominal tenderness. There is no guarding.   Musculoskeletal: Normal range of motion.      Comments: Wound VAC on left lower extremity.   Skin:     General: Skin is warm.      Capillary Refill: Capillary refill takes less than 2 seconds.   Neurological:      General: No focal deficit present.      Mental Status: He is alert and oriented to person, place, and time. Mental status is at baseline.      Cranial Nerves: No cranial nerve deficit.      Motor: No weakness.   Psychiatric:         Mood and Affect: Mood normal.         Behavior: Behavior normal.         Thought Content: Thought content normal.         Judgment: Judgment normal.         Fluids    Intake/Output Summary (Last 24 hours) at 5/17/2020 0954  Last data filed at 5/17/2020 0905  Gross per 24 hour   Intake 917 ml   Output 1100 ml   Net -183 ml       Laboratory  Recent Labs     05/16/20  0110 05/16/20  1003 05/17/20  0253   WBC 5.7 5.9 5.9   RBC 2.19* 2.64* 2.58*   HEMOGLOBIN 6.8* 8.1* 7.9*   HEMATOCRIT 21.0* 25.1* 24.4*   MCV 95.9 95.8 94.6   MCH 31.1 31.1 30.6   MCHC 32.4* 32.4* 32.4*   RDW 51.8* 53.1* 51.6*   PLATELETCT 143* 144* 149*   MPV 9.3 10.0 9.2     Recent Labs     05/15/20  0052 05/16/20  0110 05/17/20  0253   SODIUM 139 140 134*   POTASSIUM 4.5 4.4 4.3   CHLORIDE 104 104 100   CO2 20 24 25   GLUCOSE 101* 97 108*   BUN 19 17  14   CREATININE 1.09 0.94 0.86   CALCIUM 8.5 8.0* 8.5                   Imaging  IR-US GUIDED PIV   Final Result    Ultrasound-guided PERIPHERAL IV INSERTION performed by    qualified nursing staff as above.            CT-EXTREMITY, LOWER WITH LEFT   Final Result         1. No evidence of osteomyelitis.   2. Cellulitis and soft tissue organs of the left lower leg. Subcutaneous air without drainable abscess.   3. Suspected pes planovalgus deformity. Multifocal osteoarthrosis of the midfoot.      US-RENAL   Final Result      1.  Right kidney appears smaller than left kidney which could be due to atrophy.      2.  No hydronephrosis.      3.  Incidentally noted small right pleural effusion.      CT-EXTREMITY, LOWER WITH LEFT   Final Result      1.  Extensive subcutaneous edema with overlying skin thickening. Inflammation extends along the fascial planes with curvilinear fluid superficial to the gastrocnemius muscle. No soft tissue gas to suggest necrotizing fasciitis.      2.  Osteopenia           Assessment/Plan  * Venous stasis ulcer of left lower extremity (HCC)- (present on admission)  Assessment & Plan  With surrounding cellulitis improving. Arterial US negative for arterial insufficiency.  - s/p washout on 4/22, 4/24, 4/27 and 5/4  - ID and orthopedic surgery following, appreciate recs  - continue with linezolid and Meropenem, per ID recs  - abx to be continued x7 days post skin graft  - continue with wound care; per LPS, tendon still visible so recommending surgery for skin graft be postponed now until 5/18    Hyperkalemia  Assessment & Plan  Resolved.  Continue to monitor    Venous stasis dermatitis of both lower extremities- (present on admission)  Assessment & Plan  Chronic venous stasis of bilateral LE.   - management as above    CKD (chronic kidney disease) stage 3, GFR 30-59 ml/min ([facility])- (present on admission)  Assessment & Plan  Acute on chronic resolved. Cr now normalized.   - avoid nephrotoxic agents;  renally dose medications  Monitor kidney function and urine output    Essential hypertension- (present on admission)  Assessment & Plan  Intermittently hypertensive.   - continue home amlodipine.  Started on losartan during admission  - PRN hydralazine    Constipation  Assessment & Plan  Improving.   - continue bowel regimen    Normocytic anemia- (present on admission)  Assessment & Plan  Likely anemia of chronic disease and in the setting of slow blood loss from his LE wound, which has now improved.   No active signs of bleeding.    Patient is hemodynamically stable and asymptomatic  Will continue to trend with CBC  s/p 1U PRBC on 4/29, 5/5, 5/16  Continue iron supplementation  Transfuse to maintain hemoglobin greater than 7.  Results from last 7 days   Lab Units 05/17/20  0253 05/16/20  1003 05/16/20  0110 05/15/20  0052   HGB 1503 g/dL 7.9* 8.1* 6.8* 7.1*   HCT 1504 % 24.4* 25.1* 21.0* 22.2*   MCV 1505 fL 94.6 95.8 95.9 96.5     Folate -Folic Acid   Date Value Ref Range Status   12/28/2019 19.6 >4.0 ng/mL Final     Vitamin B12 -True Cobalamin   Date Value Ref Range Status   04/22/2020 1374 (H) 211 - 911 pg/mL Final     Reticulocyte Count   Date Value Ref Range Status   12/28/2019 2.1 0.8 - 2.1 % Final     Retic, Absolute   Date Value Ref Range Status   12/28/2019 0.08 (H) 0.04 - 0.06 M/uL Final     Imm. Reticulocyte Fraction   Date Value Ref Range Status   12/28/2019 24.4 (H) 9.3 - 17.4 % Final     Retic Hgb Equivalent   Date Value Ref Range Status   12/28/2019 29.2 29.0 - 35.0 pg/cell Final      Ferritin   Date Value Ref Range Status   12/28/2019 77.7 22.0 - 322.0 ng/mL Final     Iron   Date Value Ref Range Status   04/22/2020 27 (L) 50 - 180 ug/dL Final     Total Iron Binding   Date Value Ref Range Status   04/22/2020 131 (L) 250 - 450 ug/dL Final     % Saturation   Date Value Ref Range Status   04/22/2020 21 15 - 55 % Final         Methamphetamine abuse (HCC)- (present on admission)  Assessment &  Plan  Positive UDS for amphetamine, oxycodone and marijuana.  - continued counseling and education  Not candidate for outpatient infusion for antibiotics    Alcohol abuse- (present on admission)  Assessment & Plan  - out of withdrawal window  - continued counseling and education  - correct electrolytes PRN    Anxiety- (present on admission)  Assessment & Plan  Stable.   - continue home sertraline  Monitor closely for serotonin syndrome as patient is also on linezolid    Gastrointestinal prophylaxis: The patient has no risk factors for developing gastric ulcers or gastritis.  As the patient is tolerating oral intake, GI prophylaxis is not indicated at this time.  Antibiotics: IV Meropenem, linezolid  Diet Order Regular  Code status: Full  Prognosis: Guarded  Risk: The Patient is at HIGH risk for complications and decompensation secondary to his multiple cormorbidities including left lower extremity cellulitis complicated by poor healing from venous stasis dermatitis and venous stasis ulcers requiring IV antibiotics, multiple incision and drainage with washout, and wound VAC.  Hypertension, chronic kidney disease, anemia requiring blood transfusions    I have personally reviewed notes, labs, vitals, imaging.  I discussed the plan of care with bedside RN as well as on multidisciplinary rounds     I have performed a physical exam and reviewed and updated ROS and Plan today (5/17/2020). In review of yesterday's note (5/16/2020), there are no changes except as documented above.       VTE prophylaxis: SCD on right leg.  Avoid anticoagulation in the setting of anemia requiring transfusions

## 2020-05-17 NOTE — CARE PLAN
Problem: Safety  Goal: Will remain free from falls  Outcome: PROGRESSING AS EXPECTED   Patient is HIGH fall risk - bed alarm on, spill/clutter free environment, belongings within reach.     Problem: Infection  Goal: Will remain free from infection  Outcome: PROGRESSING AS EXPECTED   IV ABX administered per order, wound vac in place - cannister changed, dressing is clean, dry, and intact.

## 2020-05-18 LAB
ANION GAP SERPL CALC-SCNC: 10 MMOL/L (ref 7–16)
BASOPHILS # BLD AUTO: 0.7 % (ref 0–1.8)
BASOPHILS # BLD: 0.05 K/UL (ref 0–0.12)
BUN SERPL-MCNC: 13 MG/DL (ref 8–22)
CALCIUM SERPL-MCNC: 8.4 MG/DL (ref 8.5–10.5)
CHLORIDE SERPL-SCNC: 101 MMOL/L (ref 96–112)
CO2 SERPL-SCNC: 26 MMOL/L (ref 20–33)
CREAT SERPL-MCNC: 0.84 MG/DL (ref 0.5–1.4)
EOSINOPHIL # BLD AUTO: 0.32 K/UL (ref 0–0.51)
EOSINOPHIL NFR BLD: 4.8 % (ref 0–6.9)
ERYTHROCYTE [DISTWIDTH] IN BLOOD BY AUTOMATED COUNT: 50.1 FL (ref 35.9–50)
ERYTHROCYTE [SEDIMENTATION RATE] IN BLOOD BY WESTERGREN METHOD: 31 MM/HOUR (ref 0–20)
GLUCOSE SERPL-MCNC: 105 MG/DL (ref 65–99)
HCT VFR BLD AUTO: 22.9 % (ref 42–52)
HGB BLD-MCNC: 7.5 G/DL (ref 14–18)
IMM GRANULOCYTES # BLD AUTO: 0.09 K/UL (ref 0–0.11)
IMM GRANULOCYTES NFR BLD AUTO: 1.3 % (ref 0–0.9)
LYMPHOCYTES # BLD AUTO: 1.01 K/UL (ref 1–4.8)
LYMPHOCYTES NFR BLD: 15.1 % (ref 22–41)
MAGNESIUM SERPL-MCNC: 1.8 MG/DL (ref 1.5–2.5)
MCH RBC QN AUTO: 30.5 PG (ref 27–33)
MCHC RBC AUTO-ENTMCNC: 32.8 G/DL (ref 33.7–35.3)
MCV RBC AUTO: 93.1 FL (ref 81.4–97.8)
MONOCYTES # BLD AUTO: 0.97 K/UL (ref 0–0.85)
MONOCYTES NFR BLD AUTO: 14.5 % (ref 0–13.4)
NEUTROPHILS # BLD AUTO: 4.25 K/UL (ref 1.82–7.42)
NEUTROPHILS NFR BLD: 63.6 % (ref 44–72)
NRBC # BLD AUTO: 0.04 K/UL
NRBC BLD-RTO: 0.6 /100 WBC
PHOSPHATE SERPL-MCNC: 2 MG/DL (ref 2.5–4.5)
PLATELET # BLD AUTO: 168 K/UL (ref 164–446)
PMV BLD AUTO: 9.5 FL (ref 9–12.9)
POTASSIUM SERPL-SCNC: 4.1 MMOL/L (ref 3.6–5.5)
RBC # BLD AUTO: 2.46 M/UL (ref 4.7–6.1)
SODIUM SERPL-SCNC: 137 MMOL/L (ref 135–145)
WBC # BLD AUTO: 6.7 K/UL (ref 4.8–10.8)

## 2020-05-18 PROCEDURE — A9270 NON-COVERED ITEM OR SERVICE: HCPCS | Performed by: FAMILY MEDICINE

## 2020-05-18 PROCEDURE — 770021 HCHG ROOM/CARE - ISO PRIVATE

## 2020-05-18 PROCEDURE — A9270 NON-COVERED ITEM OR SERVICE: HCPCS | Performed by: INTERNAL MEDICINE

## 2020-05-18 PROCEDURE — 700111 HCHG RX REV CODE 636 W/ 250 OVERRIDE (IP): Performed by: FAMILY MEDICINE

## 2020-05-18 PROCEDURE — 36415 COLL VENOUS BLD VENIPUNCTURE: CPT

## 2020-05-18 PROCEDURE — 700105 HCHG RX REV CODE 258: Performed by: INTERNAL MEDICINE

## 2020-05-18 PROCEDURE — 99232 SBSQ HOSP IP/OBS MODERATE 35: CPT | Performed by: NURSE PRACTITIONER

## 2020-05-18 PROCEDURE — 85652 RBC SED RATE AUTOMATED: CPT

## 2020-05-18 PROCEDURE — 97606 NEG PRS WND THER DME>50 SQCM: CPT

## 2020-05-18 PROCEDURE — 700111 HCHG RX REV CODE 636 W/ 250 OVERRIDE (IP): Performed by: INTERNAL MEDICINE

## 2020-05-18 PROCEDURE — 306263 VAC CANNISTER W/GEL 500ML: Performed by: INTERNAL MEDICINE

## 2020-05-18 PROCEDURE — A9270 NON-COVERED ITEM OR SERVICE: HCPCS | Performed by: NURSE PRACTITIONER

## 2020-05-18 PROCEDURE — 84100 ASSAY OF PHOSPHORUS: CPT

## 2020-05-18 PROCEDURE — 80048 BASIC METABOLIC PNL TOTAL CA: CPT

## 2020-05-18 PROCEDURE — 700102 HCHG RX REV CODE 250 W/ 637 OVERRIDE(OP): Performed by: HOSPITALIST

## 2020-05-18 PROCEDURE — 700102 HCHG RX REV CODE 250 W/ 637 OVERRIDE(OP): Performed by: NURSE PRACTITIONER

## 2020-05-18 PROCEDURE — 85025 COMPLETE CBC W/AUTO DIFF WBC: CPT

## 2020-05-18 PROCEDURE — 700102 HCHG RX REV CODE 250 W/ 637 OVERRIDE(OP): Performed by: INTERNAL MEDICINE

## 2020-05-18 PROCEDURE — 99232 SBSQ HOSP IP/OBS MODERATE 35: CPT | Performed by: INTERNAL MEDICINE

## 2020-05-18 PROCEDURE — 700102 HCHG RX REV CODE 250 W/ 637 OVERRIDE(OP): Performed by: FAMILY MEDICINE

## 2020-05-18 PROCEDURE — A9270 NON-COVERED ITEM OR SERVICE: HCPCS | Performed by: HOSPITALIST

## 2020-05-18 PROCEDURE — 83735 ASSAY OF MAGNESIUM: CPT

## 2020-05-18 RX ADMIN — DIBASIC SODIUM PHOSPHATE, MONOBASIC POTASSIUM PHOSPHATE AND MONOBASIC SODIUM PHOSPHATE 2 TABLET: 852; 155; 130 TABLET ORAL at 06:22

## 2020-05-18 RX ADMIN — FERROUS GLUCONATE 324 MG: 324 TABLET ORAL at 08:12

## 2020-05-18 RX ADMIN — MEROPENEM 500 MG: 500 INJECTION, POWDER, FOR SOLUTION INTRAVENOUS at 08:13

## 2020-05-18 RX ADMIN — DIBASIC SODIUM PHOSPHATE, MONOBASIC POTASSIUM PHOSPHATE AND MONOBASIC SODIUM PHOSPHATE 2 TABLET: 852; 155; 130 TABLET ORAL at 17:58

## 2020-05-18 RX ADMIN — ATORVASTATIN CALCIUM 40 MG: 40 TABLET, FILM COATED ORAL at 06:05

## 2020-05-18 RX ADMIN — LOSARTAN POTASSIUM 25 MG: 25 TABLET, FILM COATED ORAL at 06:05

## 2020-05-18 RX ADMIN — MORPHINE SULFATE 4 MG: 4 INJECTION INTRAVENOUS at 08:47

## 2020-05-18 RX ADMIN — MEROPENEM 500 MG: 500 INJECTION, POWDER, FOR SOLUTION INTRAVENOUS at 01:31

## 2020-05-18 RX ADMIN — AMLODIPINE BESYLATE 10 MG: 10 TABLET ORAL at 06:05

## 2020-05-18 RX ADMIN — DIBASIC SODIUM PHOSPHATE, MONOBASIC POTASSIUM PHOSPHATE AND MONOBASIC SODIUM PHOSPHATE 2 TABLET: 852; 155; 130 TABLET ORAL at 13:10

## 2020-05-18 RX ADMIN — Medication 400 MG: at 13:10

## 2020-05-18 RX ADMIN — SERTRALINE HYDROCHLORIDE 50 MG: 50 TABLET ORAL at 06:05

## 2020-05-18 RX ADMIN — OXYCODONE 10 MG: 5 TABLET ORAL at 21:11

## 2020-05-18 RX ADMIN — DOCUSATE SODIUM 50 MG AND SENNOSIDES 8.6 MG 2 TABLET: 8.6; 5 TABLET, FILM COATED ORAL at 17:58

## 2020-05-18 RX ADMIN — PROMETHAZINE HYDROCHLORIDE 25 MG: 25 TABLET ORAL at 06:23

## 2020-05-18 RX ADMIN — OXYCODONE 10 MG: 5 TABLET ORAL at 15:21

## 2020-05-18 RX ADMIN — OXYCODONE 10 MG: 5 TABLET ORAL at 06:01

## 2020-05-18 ASSESSMENT — ENCOUNTER SYMPTOMS
VOMITING: 0
HEADACHES: 0
SENSORY CHANGE: 1
NERVOUS/ANXIOUS: 0
COUGH: 0
SINUS PAIN: 0
FEVER: 0
SORE THROAT: 0
BLURRED VISION: 0
SHORTNESS OF BREATH: 0
HEMOPTYSIS: 0
FALLS: 0
WEAKNESS: 0
CHILLS: 0
ABDOMINAL PAIN: 0
MYALGIAS: 1
DEPRESSION: 0
NAUSEA: 1
SPUTUM PRODUCTION: 0
CONSTIPATION: 0
WHEEZING: 0
DIZZINESS: 0
PALPITATIONS: 0
DIARRHEA: 0

## 2020-05-18 NOTE — PROGRESS NOTES
Assume care of pt at 0700. Report received from NOC RN. Pt is A/O x4. Pain is 6/10 medicated per MAR. Pt is resting in bed. Bed in lowest and locked position, call light in reach, hourly rounding in place. Labs reviewed. Communication board updated. Will continue to monitor.     Pt seen by wound team this AM, surgery canceled as tendon still visible. Needs discussed, no further needs at this time.

## 2020-05-18 NOTE — PROGRESS NOTES
RN received report from Hawa ROGERS at 1900. Patient resting in bed, no signs of distress, on room air. Assessment completed, vitals stable. Patient alert and oriented x4. PRN oxycodone given for pain in left foot. Wound vac cannister changed, yellow drainage noted. Wound vac dressing clean dry and intact. Wound vac running per settings. Contact precautions maintained and followed. Bed locked in lowest position. Call light in reach of patient. Hourly rounding in place. RN to continue to monitor.

## 2020-05-18 NOTE — CARE PLAN
Problem: Communication  Goal: The ability to communicate needs accurately and effectively will improve  Outcome: PROGRESSING AS EXPECTED  Note: Pt able to voice needs and concerns appropriately through use of call light system and hourly rounding.      Problem: Knowledge Deficit  Goal: Knowledge of disease process/condition, treatment plan, diagnostic tests, and medications will improve  Outcome: PROGRESSING SLOWER THAN EXPECTED  Note: Pt unable to have surgery done today per LPS and wound team. Pt aware of reasoning, agreeable with plan of care.

## 2020-05-18 NOTE — CARE PLAN
Problem: Safety  Goal: Will remain free from injury  Outcome: PROGRESSING AS EXPECTED  Goal: Will remain free from falls  Outcome: PROGRESSING AS EXPECTED  Note: Call light in reach. Bed locked in lowest position. Treaded socks on patient. Bed alarm in place. Hourly rounding in place. Fall precautions noted.       Problem: Infection  Goal: Will remain free from infection  Outcome: PROGRESSING AS EXPECTED  Note: IV antibiotics administered per order. Contact precautions in place per order. Frequent handwashing before and after patient contact.

## 2020-05-18 NOTE — PROGRESS NOTES
[facility] Medicine Daily Progress Note    Date of Service  5/18/2020    Chief Complaint  64 y.o. male admitted 4/16/2020 with left leg redness and swelling    Hospital Course    Mr. Goran Chavez is a 64 y.o. male with past medical history significant for venous stasis dermatitis and stasis ulcers of bilateral lower extremities who presented on 4/16/2020 with left leg redness and swelling.  Patient was previously followed by wound care as an outpatient but was discharged when ulcers had resolved, but ulcers came back 1 week prior to presentation.  Patient has had multiple incision and drainages with a wound VAC in place.  Intraoperative wound culture grew Pseudomonas and group A strep.  Current plans for skin graft.        Interval Problem Update  Patient was seen and examined at bedside.  I have personally reviewed vitals, labs, and imaging.    5/12.  Patient has been hypertensive.  Patient denies fever, chills, chest pain, shortness of breath.  Patient does report nausea but is able to keep food down and has good p.o. intake.  5/13.  Blood pressure improved after started on losartan addition to lidocaine.  Afebrile.  Patient with pain in left lower extremity is well controlled.  Denies fever, chills, chest pain, shortness of breath.  After evaluation by wound care tenderness still visible and they would like more granulation tissue prior to skin graft.  Skin graft has again been postponed until 5/18.  Wound VAC was changed today.  Does complain of nausea but is adequate p.o. intake and keeping food down.  Platelets slightly trended down to 144 we will monitor  5/14.  Afebrile with stable vitals.  Hemoglobin 7.  No acute events overnight.  Still reports mild nausea.  Tolerating PO intake  5/15.  Afebrile with stable vitals.  Switched from Zosyn to Meropenem yesterday per ID.  Platelets stable on Linezolid.  Denies fever, chills, chest pain, shortness of breath.  Endorses chronic nausea  5/16.  Afebrile with  stable vitals.  Hgb 6.8 this morning.  Transfused this morning.  Denies fever, chills, chest pain, SOB.  No signs of active bleeding.  Chronic nausea is well controlled.  5/17.  Afebrile with stable vitals.  Hgb 7.9. Denies fever, chills, chest pain, SOB.  Replete Mag and Phos .  Reports blood transfusion yesterday made him nauseous.  Reassess wound and possible skin graft tomorrow.  5/18.  Afebrile with stable vitals.  Wound vac changed.  Tendon still visible and skin graft delayed.  Continue wound vac.  I did speak with ID Dr. Hemphill and will discontinue Abx.  Denies fever, chills, chest pain, shortness of breath.      Consultants/Specialty  Infectious disease  LPS  Ortho    Code Status  Full    Disposition  PT/OT  Possibly discharge with wound vac and consider skin graft at a later time if still needed     Review of Systems  Review of Systems   Constitutional: Negative for chills and fever.   HENT: Negative for congestion, sinus pain and sore throat.    Eyes: Negative for blurred vision.   Respiratory: Negative for cough, hemoptysis, sputum production, shortness of breath and wheezing.    Cardiovascular: Negative for chest pain, palpitations and leg swelling.   Gastrointestinal: Positive for nausea. Negative for abdominal pain, constipation, diarrhea and vomiting.   Genitourinary: Negative for dysuria, frequency and urgency.   Musculoskeletal: Negative for falls.   Skin: Positive for rash (Left lower extremity).   Neurological: Negative for dizziness, weakness and headaches.   Psychiatric/Behavioral: Negative for depression. The patient is not nervous/anxious.    All other systems reviewed and are negative.       Physical Exam  Temp:  [36.1 °C (97 °F)-37.2 °C (98.9 °F)] (P) 37.2 °C (98.9 °F)  Pulse:  [69-79] (P) 69  Resp:  [17-20] (P) 20  BP: (124-145)/(70-90) (P) 156/83  SpO2:  [91 %-96 %] (P) 92 %    Physical Exam  Vitals signs and nursing note reviewed.   Constitutional:       General: He is not in acute  distress.     Appearance: Normal appearance. He is normal weight.   HENT:      Head: Normocephalic and atraumatic.      Nose: Nose normal.      Mouth/Throat:      Mouth: Mucous membranes are moist.      Pharynx: Oropharynx is clear.   Eyes:      Extraocular Movements: Extraocular movements intact.      Conjunctiva/sclera: Conjunctivae normal.   Neck:      Musculoskeletal: Normal range of motion and neck supple.   Cardiovascular:      Rate and Rhythm: Normal rate and regular rhythm.      Pulses: Normal pulses.      Heart sounds: Normal heart sounds. No murmur. No friction rub. No gallop.    Pulmonary:      Effort: Pulmonary effort is normal. No respiratory distress.      Breath sounds: Normal breath sounds. No wheezing or rales.   Chest:      Chest wall: No tenderness.   Abdominal:      General: Abdomen is flat. Bowel sounds are normal. There is no distension.      Palpations: Abdomen is soft. There is no mass.      Tenderness: There is no abdominal tenderness. There is no guarding.   Musculoskeletal: Normal range of motion.      Comments: Wound VAC on left lower extremity.   Skin:     General: Skin is warm.      Capillary Refill: Capillary refill takes less than 2 seconds.   Neurological:      General: No focal deficit present.      Mental Status: He is alert and oriented to person, place, and time. Mental status is at baseline.      Cranial Nerves: No cranial nerve deficit.      Motor: No weakness.   Psychiatric:         Mood and Affect: Mood normal.         Behavior: Behavior normal.         Thought Content: Thought content normal.         Judgment: Judgment normal.         Fluids    Intake/Output Summary (Last 24 hours) at 5/18/2020 1012  Last data filed at 5/18/2020 0633  Gross per 24 hour   Intake 340 ml   Output 1225 ml   Net -885 ml       Laboratory  Recent Labs     05/16/20  1003 05/17/20  0253 05/18/20  0245   WBC 5.9 5.9 6.7   RBC 2.64* 2.58* 2.46*   HEMOGLOBIN 8.1* 7.9* 7.5*   HEMATOCRIT 25.1* 24.4* 22.9*    MCV 95.8 94.6 93.1   MCH 31.1 30.6 30.5   MCHC 32.4* 32.4* 32.8*   RDW 53.1* 51.6* 50.1*   PLATELETCT 144* 149* 168   MPV 10.0 9.2 9.5     Recent Labs     05/16/20  0110 05/17/20  0253 05/18/20  0245   SODIUM 140 134* 137   POTASSIUM 4.4 4.3 4.1   CHLORIDE 104 100 101   CO2 24 25 26   GLUCOSE 97 108* 105*   BUN 17 14 13   CREATININE 0.94 0.86 0.84   CALCIUM 8.0* 8.5 8.4*                   Imaging  IR-US GUIDED PIV   Final Result    Ultrasound-guided PERIPHERAL IV INSERTION performed by    qualified nursing staff as above.            CT-EXTREMITY, LOWER WITH LEFT   Final Result         1. No evidence of osteomyelitis.   2. Cellulitis and soft tissue organs of the left lower leg. Subcutaneous air without drainable abscess.   3. Suspected pes planovalgus deformity. Multifocal osteoarthrosis of the midfoot.      US-RENAL   Final Result      1.  Right kidney appears smaller than left kidney which could be due to atrophy.      2.  No hydronephrosis.      3.  Incidentally noted small right pleural effusion.      CT-EXTREMITY, LOWER WITH LEFT   Final Result      1.  Extensive subcutaneous edema with overlying skin thickening. Inflammation extends along the fascial planes with curvilinear fluid superficial to the gastrocnemius muscle. No soft tissue gas to suggest necrotizing fasciitis.      2.  Osteopenia           Assessment/Plan  * Venous stasis ulcer of left lower extremity (HCC)- (present on admission)  Assessment & Plan  With surrounding cellulitis improving. Arterial US negative for arterial insufficiency.  - s/p washout on 4/22, 4/24, 4/27 and 5/4  - ID and orthopedic surgery following, appreciate recs  Completed course of antibiotics per ID  - continue with wound care; per LPS, tendon still visible so recommending surgery for skin graft be postponed now until 5/18    Hyperkalemia  Assessment & Plan  Resolved.  Continue to monitor    Venous stasis dermatitis of both lower extremities- (present on  admission)  Assessment & Plan  Chronic venous stasis of bilateral LE.   - management as above    CKD (chronic kidney disease) stage 3, GFR 30-59 ml/min ([facility])- (present on admission)  Assessment & Plan  Acute on chronic resolved. Cr now normalized.   - avoid nephrotoxic agents; renally dose medications  Monitor kidney function and urine output    Essential hypertension- (present on admission)  Assessment & Plan  Intermittently hypertensive.   - continue home amlodipine.  Started on losartan during admission  - PRN hydralazine    Constipation  Assessment & Plan  Improving.   - continue bowel regimen    Normocytic anemia- (present on admission)  Assessment & Plan  Likely anemia of chronic disease and in the setting of slow blood loss from his LE wound, which has now improved.   No active signs of bleeding.    Patient is hemodynamically stable and asymptomatic  Will continue to trend with CBC  s/p 1U PRBC on 4/29, 5/5, 5/16  Continue iron supplementation  Transfuse to maintain hemoglobin greater than 7.  Results from last 7 days   Lab Units 05/18/20  0245 05/17/20  0253 05/16/20  1003 05/16/20  0110   HGB 1503 g/dL 7.5* 7.9* 8.1* 6.8*   HCT 1504 % 22.9* 24.4* 25.1* 21.0*   MCV 1505 fL 93.1 94.6 95.8 95.9     Folate -Folic Acid   Date Value Ref Range Status   12/28/2019 19.6 >4.0 ng/mL Final     Vitamin B12 -True Cobalamin   Date Value Ref Range Status   04/22/2020 1374 (H) 211 - 911 pg/mL Final     Reticulocyte Count   Date Value Ref Range Status   12/28/2019 2.1 0.8 - 2.1 % Final     Retic, Absolute   Date Value Ref Range Status   12/28/2019 0.08 (H) 0.04 - 0.06 M/uL Final     Imm. Reticulocyte Fraction   Date Value Ref Range Status   12/28/2019 24.4 (H) 9.3 - 17.4 % Final     Retic Hgb Equivalent   Date Value Ref Range Status   12/28/2019 29.2 29.0 - 35.0 pg/cell Final      Ferritin   Date Value Ref Range Status   12/28/2019 77.7 22.0 - 322.0 ng/mL Final     Iron   Date Value Ref Range Status   04/22/2020 27 (L) 50 -  180 ug/dL Final     Total Iron Binding   Date Value Ref Range Status   04/22/2020 131 (L) 250 - 450 ug/dL Final     % Saturation   Date Value Ref Range Status   04/22/2020 21 15 - 55 % Final         Methamphetamine abuse (HCC)- (present on admission)  Assessment & Plan  Positive UDS for amphetamine, oxycodone and marijuana.  - continued counseling and education  Not candidate for outpatient infusion for antibiotics    Alcohol abuse- (present on admission)  Assessment & Plan  - out of withdrawal window  - continued counseling and education  - correct electrolytes PRN    Anxiety- (present on admission)  Assessment & Plan  Stable.   - continue home sertraline    Gastrointestinal prophylaxis: The patient has no risk factors for developing gastric ulcers or gastritis.  As the patient is tolerating oral intake, GI prophylaxis is not indicated at this time.  Antibiotics: IV Meropenem, linezolid  No diet orders on file  Code status: Full  Prognosis: Guarded  Risk: The Patient is at HIGH risk for complications and decompensation secondary to his multiple cormorbidities including left lower extremity cellulitis complicated by poor healing from venous stasis dermatitis and venous stasis ulcers requiring IV antibiotics, multiple incision and drainage with washout, and wound VAC.  Hypertension, chronic kidney disease, anemia requiring blood transfusions    I have personally reviewed notes, labs, vitals, imaging.  I discussed the plan of care with bedside RN as well as on multidisciplinary rounds     I have performed a physical exam and reviewed and updated ROS and Plan today (5/18/2020). In review of yesterday's note (5/17/2020), there are no changes except as documented above.       VTE prophylaxis: SCD on right leg.  Avoid anticoagulation in the setting of anemia requiring transfusions

## 2020-05-18 NOTE — WOUND TEAM
[facility]  Inpatient Services  Wound and Skin Care Progress Note    HPI, PMH, SH: Reviewed    Unit where seen by Wound Team: [id number]     WOUND CONSULT RELATED TO:  Scheduled Negative Pressure Wound Therapy (NPWT) dressing change.     Self Report / Pain Level:  7/10 at times. Anterolateral wound very sensitive by tibia. Patient premedicated with IV pain med.    OBJECTIVE:  Previous dressings intact. Wound vac functioning properly. Wound consult placed at midnight today about vac pressure fluctuating may have been due to not recognizing the veraflo intermittent setting.    WOUND TYPE, LOCATION, CHARACTERISTICS (Pressure Injuries: location, stage, POA or date identified)          Wound 05/05/20 Full Thickness Wound Leg Lateral;Lower;Posterior;Medial Left open complicated surgical (Active)   Wound Image    05/18/20 0900           Site Assessment Red;White;Fully granulated;Bleeding 05/18/20 0900   Periwound Assessment Intact;Hemosiderin Staining 05/18/20 0900   Margins Attached edges;Defined edges 05/18/20 0900   Closure Secondary intention 05/18/20 0900   Drainage Amount Small 05/18/20 0900   Drainage Description Sanguineous 05/18/20 0900   Treatments Cleansed;Site care 05/18/20 0900   Wound Cleansing Approved Wound Cleanser 05/18/20 0900   Periwound Protectant Skin Protectant Wipes to Periwound;Paste Ring 05/18/20 0900   Dressing Cleansing/Solutions Normal Saline 05/18/20 0900   Dressing Options Wound Vac 05/18/20 0900   Dressing Changed Changed 05/18/20 0900   Dressing Status Clean;Dry;Intact 05/18/20 0900   Dressing Change/Treatment Frequency Monday, Wednesday, Friday, and As Needed 05/18/20 0900   NEXT Dressing Change/Treatment Date 05/20/20 05/18/20 0900   NEXT Weekly Photo (Inpatient Only) 05/20/20 05/18/20 0900   Non-staged Wound Description Full thickness 05/18/20 0900   Wound Length (cm) 22.7 cm 05/18/20 0900   Wound Width (cm) 13.1 cm 05/18/20 0900   Wound Depth (cm) 0.5 cm 05/18/20 0900      Wound Surface Area (cm^2) 297.37 cm^2 05/18/20 0900   Wound Volume (cm^3) 148.68 cm^3 05/18/20 0900   Wound Healing % 28 05/18/20 0900   Wound Bed Granulation (%) 100 % 05/18/20 0900   Tunneling (cm) 2.5 cm 05/18/20 0900   Tunneling Clock Position of Wound 11 05/18/20 0900   Undermining (cm) 0 cm 05/18/20 0900   Shape irregular 05/18/20 0900   Wound Odor None 05/18/20 0900   Pulses Left;1+;DP;PT 05/13/20 0900   Exposed Structures Tendon 05/18/20 0900                 Negative Pressure Wound Therapy 05/05/20 Surgical (Active)   NPWT Pump Mode / Pressure Setting Ulta;Intermittent;125 mmHg 05/18/20 0900   Dressing Type Black Foam (Veraflo);Black Foam (Regular);Medium 05/18/20 0900   Number of Foam Pieces Used 8 05/18/20 0900   Canister Changed No 05/18/20 0900   Output (mL) 425 mL 05/18/20 0028   NEXT Dressing Change/Treatment Date 05/20/20 05/18/20 0900   VAC VeraFlo Irrigant Normal Saline 05/18/20 0900   VAC VeraFlo Soak Time (mins) 6 05/18/20 0900   VAC VeraFlo Instill Volume (ml) 20 05/18/20 0900   VAC VeraFlo - Therapy Time (hrs) 2 05/18/20 0900   VAC VeraFlo Pressure (mm/Hg) Intermittent;125 mmHg 05/18/20 0900          Lab Values:    Lab Results   Component Value Date/Time    WBC 6.7 05/18/2020 02:45 AM    RBC 2.46 (L) 05/18/2020 02:45 AM    HEMOGLOBIN 7.5 (L) 05/18/2020 02:45 AM    HEMATOCRIT 22.9 (L) 05/18/2020 02:45 AM              Results from last 7 days   Lab Units 05/18/20  0245 05/14/20  0846   SED RATE WESTERGREN 1526 mm/hour 31* 80*       Lab Results   Component Value Date/Time    HBA1C 5.7 (H) 01/23/2020 11:22 AM           INTERVENTIONS BY WOUND TEAM:  Removed 2 layer wrap. Soaked foam with saline prior to removal. Removed vac dressings from all LLE wounds. Wounds irrigated with wound cleanser and cleaned with gauze 4 x 4s. periwound cleaned with wound cleanser and gauze 4 x 4s. No sting skin protectant to periwound, drape to lower medial periwound, paste ring around anterolateral wound. 3 pieces of  Veraflo black foam to anterolateral wound. 3 pieces of regular black foam to distal medial wound. Veraflo drape over anterolateral wound, regular vac drape over distal medial wound. Appropriate trac pads placed to each dressing. Dressing seal achieved to both dressings. Veraflo settings maintained at 20mL saline for 6 minutes Q 2 hours. Regular vac at 125 mmHg continuous. APRN with LPS saw wounds, so did Dr. Hemphill     Interdisciplinary consultation: Patient, Bedside RN, APRN with Limb Preservation Service. Dr. Hemphill    EVALUATION: Wounds are clean with healthy granulation tissue. Tendon to lateral wound still too exposed for skin graft.     Goals: Steady decrease in wound area and depth weekly.    NURSING PLAN OF CARE ORDERS (X):  Dressing changes: See Dressing Care orders: X  Skin care: See Skin Care orders: NA  Rectal tube care: See Rectal Tube Care orders:        Other orders:                             WOUND TEAM PLAN OF CARE (X):   Dressing changes by wound team:          Follow up 1-2 times weekly:               Follow up 3 times weekly:                NPWT change 3 times weekly:     Follow up as needed:       Other (explain):     Anticipated discharge plans (X): TBD  LTACH:        SNF/Rehab:                  Home Care:           Outpatient Wound Center:            Self Care:            Other:

## 2020-05-18 NOTE — DISCHARGE PLANNING
Anticipated Discharge Disposition: TBD    Action: Reviewed in IDT rounds that pt may not have skin graft completed and may instead go home with a wound vac. Wound Vac Insurance Auth Form signed by DR Burnett. He will order an Albumin and Nutrition consult since it is needed for Medicaid auth if ALB 3.0 or below.     Pt may need outpatient wound care vs home health.     Copy of Auth form with necessary notes faxed to Evelyn KEN Liaison at [phone] to finish completing Auth form that was pending.     Barriers to Discharge: needs wound vac for home, pending vac authorization    Plan: call placed to Brian with [facility], coordinating with her the necessary labs and notes required for the Wound Vac Application. Pending authorization-Per Evelyn, it can take a week to get the auth.

## 2020-05-19 LAB
ALBUMIN SERPL BCP-MCNC: 3.1 G/DL (ref 3.2–4.9)
ALBUMIN/GLOB SERPL: 1.1 G/DL
ALP SERPL-CCNC: 98 U/L (ref 30–99)
ALT SERPL-CCNC: 13 U/L (ref 2–50)
ANION GAP SERPL CALC-SCNC: 10 MMOL/L (ref 7–16)
AST SERPL-CCNC: 19 U/L (ref 12–45)
BASOPHILS # BLD AUTO: 0.9 % (ref 0–1.8)
BASOPHILS # BLD: 0.06 K/UL (ref 0–0.12)
BILIRUB SERPL-MCNC: 0.4 MG/DL (ref 0.1–1.5)
BUN SERPL-MCNC: 14 MG/DL (ref 8–22)
CALCIUM SERPL-MCNC: 8.1 MG/DL (ref 8.5–10.5)
CHLORIDE SERPL-SCNC: 102 MMOL/L (ref 96–112)
CO2 SERPL-SCNC: 27 MMOL/L (ref 20–33)
CREAT SERPL-MCNC: 0.83 MG/DL (ref 0.5–1.4)
EOSINOPHIL # BLD AUTO: 0.45 K/UL (ref 0–0.51)
EOSINOPHIL NFR BLD: 6.5 % (ref 0–6.9)
ERYTHROCYTE [DISTWIDTH] IN BLOOD BY AUTOMATED COUNT: 51 FL (ref 35.9–50)
GLOBULIN SER CALC-MCNC: 2.8 G/DL (ref 1.9–3.5)
GLUCOSE SERPL-MCNC: 103 MG/DL (ref 65–99)
HCT VFR BLD AUTO: 23.3 % (ref 42–52)
HGB BLD-MCNC: 7.5 G/DL (ref 14–18)
IMM GRANULOCYTES # BLD AUTO: 0.12 K/UL (ref 0–0.11)
IMM GRANULOCYTES NFR BLD AUTO: 1.7 % (ref 0–0.9)
LYMPHOCYTES # BLD AUTO: 1.28 K/UL (ref 1–4.8)
LYMPHOCYTES NFR BLD: 18.6 % (ref 22–41)
MAGNESIUM SERPL-MCNC: 1.8 MG/DL (ref 1.5–2.5)
MCH RBC QN AUTO: 30.4 PG (ref 27–33)
MCHC RBC AUTO-ENTMCNC: 32.2 G/DL (ref 33.7–35.3)
MCV RBC AUTO: 94.3 FL (ref 81.4–97.8)
MONOCYTES # BLD AUTO: 1.39 K/UL (ref 0–0.85)
MONOCYTES NFR BLD AUTO: 20.1 % (ref 0–13.4)
NEUTROPHILS # BLD AUTO: 3.6 K/UL (ref 1.82–7.42)
NEUTROPHILS NFR BLD: 52.2 % (ref 44–72)
NRBC # BLD AUTO: 0.15 K/UL
NRBC BLD-RTO: 2.2 /100 WBC
PHOSPHATE SERPL-MCNC: 2.5 MG/DL (ref 2.5–4.5)
PLATELET # BLD AUTO: 195 K/UL (ref 164–446)
PMV BLD AUTO: 9.3 FL (ref 9–12.9)
POTASSIUM SERPL-SCNC: 4.3 MMOL/L (ref 3.6–5.5)
PROT SERPL-MCNC: 5.9 G/DL (ref 6–8.2)
RBC # BLD AUTO: 2.47 M/UL (ref 4.7–6.1)
SODIUM SERPL-SCNC: 139 MMOL/L (ref 135–145)
WBC # BLD AUTO: 6.9 K/UL (ref 4.8–10.8)

## 2020-05-19 PROCEDURE — 700102 HCHG RX REV CODE 250 W/ 637 OVERRIDE(OP): Performed by: INTERNAL MEDICINE

## 2020-05-19 PROCEDURE — 306263 VAC CANNISTER W/GEL 500ML: Performed by: INTERNAL MEDICINE

## 2020-05-19 PROCEDURE — 97530 THERAPEUTIC ACTIVITIES: CPT

## 2020-05-19 PROCEDURE — 36415 COLL VENOUS BLD VENIPUNCTURE: CPT

## 2020-05-19 PROCEDURE — A9270 NON-COVERED ITEM OR SERVICE: HCPCS | Performed by: FAMILY MEDICINE

## 2020-05-19 PROCEDURE — 83735 ASSAY OF MAGNESIUM: CPT

## 2020-05-19 PROCEDURE — 770021 HCHG ROOM/CARE - ISO PRIVATE

## 2020-05-19 PROCEDURE — 700102 HCHG RX REV CODE 250 W/ 637 OVERRIDE(OP): Performed by: HOSPITALIST

## 2020-05-19 PROCEDURE — 700111 HCHG RX REV CODE 636 W/ 250 OVERRIDE (IP): Performed by: INTERNAL MEDICINE

## 2020-05-19 PROCEDURE — A9270 NON-COVERED ITEM OR SERVICE: HCPCS | Performed by: INTERNAL MEDICINE

## 2020-05-19 PROCEDURE — 84100 ASSAY OF PHOSPHORUS: CPT

## 2020-05-19 PROCEDURE — 700102 HCHG RX REV CODE 250 W/ 637 OVERRIDE(OP): Performed by: FAMILY MEDICINE

## 2020-05-19 PROCEDURE — 97116 GAIT TRAINING THERAPY: CPT

## 2020-05-19 PROCEDURE — 85025 COMPLETE CBC W/AUTO DIFF WBC: CPT

## 2020-05-19 PROCEDURE — A9270 NON-COVERED ITEM OR SERVICE: HCPCS | Performed by: HOSPITALIST

## 2020-05-19 PROCEDURE — A9270 NON-COVERED ITEM OR SERVICE: HCPCS | Performed by: NURSE PRACTITIONER

## 2020-05-19 PROCEDURE — 700102 HCHG RX REV CODE 250 W/ 637 OVERRIDE(OP): Performed by: NURSE PRACTITIONER

## 2020-05-19 PROCEDURE — 80053 COMPREHEN METABOLIC PANEL: CPT

## 2020-05-19 PROCEDURE — 99232 SBSQ HOSP IP/OBS MODERATE 35: CPT | Performed by: INTERNAL MEDICINE

## 2020-05-19 RX ORDER — AMOXICILLIN 250 MG
2 CAPSULE ORAL DAILY
Status: DISCONTINUED | OUTPATIENT
Start: 2020-05-20 | End: 2020-05-21 | Stop reason: HOSPADM

## 2020-05-19 RX ADMIN — FERROUS GLUCONATE 324 MG: 324 TABLET ORAL at 08:20

## 2020-05-19 RX ADMIN — LOSARTAN POTASSIUM 25 MG: 25 TABLET, FILM COATED ORAL at 04:53

## 2020-05-19 RX ADMIN — DOCUSATE SODIUM 50 MG AND SENNOSIDES 8.6 MG 2 TABLET: 8.6; 5 TABLET, FILM COATED ORAL at 04:52

## 2020-05-19 RX ADMIN — OXYCODONE 10 MG: 5 TABLET ORAL at 21:49

## 2020-05-19 RX ADMIN — DIBASIC SODIUM PHOSPHATE, MONOBASIC POTASSIUM PHOSPHATE AND MONOBASIC SODIUM PHOSPHATE 2 TABLET: 852; 155; 130 TABLET ORAL at 04:53

## 2020-05-19 RX ADMIN — ATORVASTATIN CALCIUM 40 MG: 40 TABLET, FILM COATED ORAL at 04:53

## 2020-05-19 RX ADMIN — OXYCODONE 10 MG: 5 TABLET ORAL at 08:52

## 2020-05-19 RX ADMIN — SERTRALINE HYDROCHLORIDE 50 MG: 50 TABLET ORAL at 04:53

## 2020-05-19 RX ADMIN — AMLODIPINE BESYLATE 10 MG: 10 TABLET ORAL at 04:53

## 2020-05-19 RX ADMIN — OXYCODONE 5 MG: 5 TABLET ORAL at 13:46

## 2020-05-19 RX ADMIN — OXYCODONE 10 MG: 5 TABLET ORAL at 02:47

## 2020-05-19 RX ADMIN — ONDANSETRON 4 MG: 4 TABLET, ORALLY DISINTEGRATING ORAL at 21:58

## 2020-05-19 ASSESSMENT — COGNITIVE AND FUNCTIONAL STATUS - GENERAL
MOBILITY SCORE: 21
WALKING IN HOSPITAL ROOM: A LITTLE
CLIMB 3 TO 5 STEPS WITH RAILING: A LITTLE
SUGGESTED CMS G CODE MODIFIER MOBILITY: CJ
STANDING UP FROM CHAIR USING ARMS: A LITTLE

## 2020-05-19 ASSESSMENT — ENCOUNTER SYMPTOMS
RESPIRATORY NEGATIVE: 1
PSYCHIATRIC NEGATIVE: 1
MUSCULOSKELETAL NEGATIVE: 1
GASTROINTESTINAL NEGATIVE: 1
CARDIOVASCULAR NEGATIVE: 1
NEUROLOGICAL NEGATIVE: 1
CONSTITUTIONAL NEGATIVE: 1

## 2020-05-19 ASSESSMENT — GAIT ASSESSMENTS
DEVIATION: SHUFFLED GAIT;BRADYKINETIC
ASSISTIVE DEVICE: FRONT WHEEL WALKER
GAIT LEVEL OF ASSIST: SUPERVISED
DISTANCE (FEET): 100

## 2020-05-19 NOTE — PROGRESS NOTES
Internal Medicine Daily Progress Note    Date of Service  5/19/2020    Chief Complaint  64 y.o. male admitted 4/16/2020 with [facility] Course        Pt with past medical history significant for venous stasis dermatitis and stasis ulcers of bilateral lower extremities who presented on 4/16/2020 with left leg redness and swelling.  Patient was previously followed by wound care as an outpatient but was discharged when ulcers had resolved, but ulcers came back 1 week prior to presentation.  Patient has had multiple incision and drainages with a wound VAC in place. Intraoperative wound culture grew Pseudomonas and group A strep. Current plans for skin graft.    Interval Problem Update  Was on Zosyn, Meropenem, d/c'd by ID 5/18  Pending skin graft 5/19    Consultants/Specialty  Infectious disease  LPS  Ortho    Code Status  Full    Disposition  ?    Review of Systems  Review of Systems   Constitutional: Negative.    Respiratory: Negative.    Cardiovascular: Negative.    Gastrointestinal: Negative.    Genitourinary: Negative.    Musculoskeletal: Negative.    Neurological: Negative.    Psychiatric/Behavioral: Negative.         Physical Exam  Temp:  [36.8 °C (98.3 °F)-37.1 °C (98.7 °F)] 36.9 °C (98.4 °F)  Pulse:  [65-80] 65  Resp:  [18] 18  BP: (112-150)/(75-83) 145/83  SpO2:  [90 %-95 %] 90 %    Physical Exam  Vitals signs and nursing note reviewed.   Constitutional:       Comments: Disheveled     HENT:      Head: Normocephalic.   Cardiovascular:      Rate and Rhythm: Normal rate and regular rhythm.      Pulses: Normal pulses.      Heart sounds: Normal heart sounds.   Pulmonary:      Effort: Pulmonary effort is normal.      Breath sounds: Normal breath sounds.   Abdominal:      General: Abdomen is flat. Bowel sounds are normal.      Palpations: Abdomen is soft.   Musculoskeletal: Normal range of motion.   Skin:     General: Skin is warm.      Findings: Lesion (wound vac on LLE ulcer) present.    Neurological:      Mental Status: He is alert. Mental status is at baseline.   Psychiatric:         Behavior: Behavior normal.         Fluids    Intake/Output Summary (Last 24 hours) at 5/19/2020 1143  Last data filed at 5/19/2020 1000  Gross per 24 hour   Intake 600 ml   Output 1500 ml   Net -900 ml       Laboratory  Recent Labs     05/17/20  0253 05/18/20  0245 05/19/20  0243   WBC 5.9 6.7 6.9   RBC 2.58* 2.46* 2.47*   HEMOGLOBIN 7.9* 7.5* 7.5*   HEMATOCRIT 24.4* 22.9* 23.3*   MCV 94.6 93.1 94.3   MCH 30.6 30.5 30.4   MCHC 32.4* 32.8* 32.2*   RDW 51.6* 50.1* 51.0*   PLATELETCT 149* 168 195   MPV 9.2 9.5 9.3     Recent Labs     05/17/20  0253 05/18/20  0245 05/19/20  0243   SODIUM 134* 137 139   POTASSIUM 4.3 4.1 4.3   CHLORIDE 100 101 102   CO2 25 26 27   GLUCOSE 108* 105* 103*   BUN 14 13 14   CREATININE 0.86 0.84 0.83   CALCIUM 8.5 8.4* 8.1*                   Imaging  IR-US GUIDED PIV   Final Result    Ultrasound-guided PERIPHERAL IV INSERTION performed by    qualified nursing staff as above.            CT-EXTREMITY, LOWER WITH LEFT   Final Result         1. No evidence of osteomyelitis.   2. Cellulitis and soft tissue organs of the left lower leg. Subcutaneous air without drainable abscess.   3. Suspected pes planovalgus deformity. Multifocal osteoarthrosis of the midfoot.      US-RENAL   Final Result      1.  Right kidney appears smaller than left kidney which could be due to atrophy.      2.  No hydronephrosis.      3.  Incidentally noted small right pleural effusion.      CT-EXTREMITY, LOWER WITH LEFT   Final Result      1.  Extensive subcutaneous edema with overlying skin thickening. Inflammation extends along the fascial planes with curvilinear fluid superficial to the gastrocnemius muscle. No soft tissue gas to suggest necrotizing fasciitis.      2.  Osteopenia           Assessment/Plan  * Venous stasis ulcer of left lower extremity (HCC)- (present on admission)  Assessment & Plan  With surrounding  cellulitis improving. Arterial US negative for arterial insufficiency.  - s/p washout on 4/22, 4/24, 4/27 and 5/4  - ID and orthopedic surgery following, appreciate recs  Completed course of antibiotics per ID  - continue with wound care; per LPS, tendon still visible so recommending surgery for skin graft be postponed until 5/19    CKD (chronic kidney disease) stage 3, GFR 30-59 ml/min ([facility])- (present on admission)  Assessment & Plan  Renal function stable, with creat nl    Essential hypertension- (present on admission)  Assessment & Plan  Controlled with amlodipine, losartan      Normocytic anemia- (present on admission)  Assessment & Plan  Likely anemia of chronic disease and in the setting of slow blood loss from his LE wound, which has now improved.   No active signs of bleeding.    Patient is hemodynamically stable and asymptomatic  Will continue to trend with CBC  s/p 1U PRBC on 4/29, 5/5, 5/16  Continue iron supplementation  Transfuse to maintain hemoglobin greater than 7.    Methamphetamine abuse ([facility])- (present on admission)  Assessment & Plan  Positive UDS for amphetamine, oxycodone and marijuana.  - continued counseling and education  Not candidate for outpatient infusion for antibiotics    Alcohol abuse- (present on admission)  Assessment & Plan  Past CIWA requirements       VTE prophylaxis: on hold d/t pending surgical procedure

## 2020-05-19 NOTE — CARE PLAN
Problem: Safety  Goal: Will remain free from injury  Outcome: PROGRESSING AS EXPECTED  Goal: Will remain free from falls  Outcome: PROGRESSING AS EXPECTED  Note: Call light in reach. Bed locked in lowest position. Treaded socks on patient. Bed alarm in place. Hourly rounding in place. Fall precautions noted.       Problem: Pain Management  Goal: Pain level will decrease to patient's comfort goal  Outcome: PROGRESSING AS EXPECTED  Note: Frequent pain assessments and reassessments. PRN medication administered. Elevation encouraged as non pharmacological pain method.

## 2020-05-19 NOTE — ASSESSMENT & PLAN NOTE
Likely anemia of chronic disease and in the setting of slow blood loss from his LE wound, which has now improved.   No active signs of bleeding.    Patient is hemodynamically stable and asymptomatic  Will continue to trend with CBC  s/p 1U PRBC on 4/29, 5/5, 5/16  Continue iron supplementation  Transfuse to maintain hemoglobin greater than 7.

## 2020-05-19 NOTE — PROGRESS NOTES
Bedside report received from Anna ROGERS . Assumed care of pt at 0645 . Pt is awake at this time with no signs of distress. Plan of care discussed with pt. Pt is A&O x 4. Pt is on RA. Bed alarm is on, bed in lowest position, bed rails up x 2, belongings and call light within reach. Hourly rounding in place.

## 2020-05-19 NOTE — DIETARY
Nutrition services: Day 33 of admit.  63 yo male admitted with cellulitis of left lower extremity.  Consult for adequacy of nutritional intake.    Evaluation:  1. Scale weight 82.2 kg is decreased 1.2 kg from admit stand up scale weight of 83.4 kg. This is a 1% weight loss over 1 month which is not significant. Likely related to lengthy hospitalization, decreased mobility and loss of lean muscle mass as well normal fluid balance fluctuations.  2. Pt with history of venous stasis dermatitis of both legs and venous status ulcers of his left leg. He was discharged 1 month prior to this admit when  The ulcers came back.   3. Albumin decreased related to infection and wound - not an indicator of nutrition status but rather of inflammation. CRP elevated at 31.70 on admit and decreased to 14.44 on 4/22 decreased from 31.70 on admit. No new CRP labs. Albumin has ranged from 2.2 - 3.1 and trending up with decrease in inflammation.   4. Pt is on a regular diet taking % of meals and has remained consistent through admit.  No issues with po intake prior to admit.     Malnutrition risk: na    Recommendations/Plan:  1. encourage intake of meals.  2. monitor biweekly weights

## 2020-05-19 NOTE — PROGRESS NOTES
RN received care at 1900 from González ROGERS. Patient resting in bed, on room air, no signs of distress. Assessment completed, vitals stable. Patient alert and oriented x4. Patient rated pain 6/10 in left leg, PRN oxycodone given and elevation encouraged of extremity. Contact precautions maintained. Wound vac in place on left foot running at correct rate per order. Bed locked lowest position. Call light in reach. RN to continue to monitor.

## 2020-05-19 NOTE — DISCHARGE PLANNING
Anticipated Discharge Disposition: TBD    Action: Rec’d vm from [facility] rep Michelle (phone [phone] Ext [phone], Fax [phone]) requesting most recent wound care notes and dietary consult be faxed to her. Call placed to Brittany dietician, to request dietary note in chart, which is required for Medicaid authorization when albumin is less than 3.5.   Faxed wound care and dietary notes to Michelle.     Barriers to Discharge: Pending Medicaid auth for home vac    Plan: awaiting Medicaid auth through [facility]. Await plan of care to see if pt to dc home with wound vac with home health or home with outpatient wound clinic. [facility] will need dc care plan note faxed to them.

## 2020-05-19 NOTE — PROGRESS NOTES
Pharmacy Pharmacotherapy Consult for LOS >30 days    Admit Date: 4/16/2020      Medications were reviewed for appropriateness and ongoing need.     Current Facility-Administered Medications   Medication Dose Route Frequency Provider Last Rate Last Dose   • losartan (COZAAR) tablet 25 mg  25 mg Oral Q DAY Everette Burnett D.OIvone   25 mg at 05/19/20 0453   • silver nitrate (SILVER NITRATE APPLICATOR) 75-25 % sticks 3 Applicator  3 Applicator Topical Once PRN Abeba Hernández, A.P.R.N.       • amLODIPine (NORVASC) tablet 10 mg  10 mg Oral Q DAY Adrian Morton M.D.   10 mg at 05/19/20 0453   • diphenhydrAMINE (BENADRYL) tablet/capsule 25 mg  25 mg Oral Q6HRS PRN Adrian Morton M.D.   25 mg at 05/08/20 2242   • senna-docusate (PERICOLACE or SENOKOT S) 8.6-50 MG per tablet 2 Tab  2 Tab Oral BID Mulugeta Pacheco, A.P.N.   2 Tab at 05/19/20 0452   • sertraline (ZOLOFT) tablet 50 mg  50 mg Oral DAILY Mulugeta Pacheco, A.P.N.   50 mg at 05/19/20 0453   • polyethylene glycol/lytes (MIRALAX) PACKET 1 Packet  1 Packet Oral DAILY Mulugeta Pacheco, A.P.N.   Stopped at 05/04/20 0600   • magnesium hydroxide (MILK OF MAGNESIA) suspension 30 mL  30 mL Oral QDAY PRN Mulugeta Pacheco, A.P.N.   30 mL at 04/24/20 1818   • atorvastatin (LIPITOR) tablet 40 mg  40 mg Oral DAILY Silverio Lopez M.D.   40 mg at 05/19/20 0453   • ferrous gluconate (FERGON) tablet 324 mg  324 mg Oral QDAY with Breakfast Silverio Lopez M.D.   324 mg at 05/19/20 0820   • acetaminophen (TYLENOL) tablet 650 mg  650 mg Oral Q6HRS PRN Silverio Lopez M.D.   650 mg at 05/12/20 0909   • ondansetron (ZOFRAN) syringe/vial injection 4 mg  4 mg Intravenous Q4HRS PRLEON Lopez M.D.   4 mg at 05/16/20 1420   • ondansetron (ZOFRAN ODT) dispertab 4 mg  4 mg Oral Q4HRS PRLEON Lopez M.D.   4 mg at 05/17/20 1556   • promethazine (PHENERGAN) tablet 12.5-25 mg  12.5-25 mg Oral Q4HRS PRLEON Lopez M.D.   25 mg at 05/18/20 0623   • promethazine  (PHENERGAN) suppository 12.5-25 mg  12.5-25 mg Rectal Q4HRS PRLEON Lopez M.D.       • prochlorperazine (COMPAZINE) injection 5-10 mg  5-10 mg Intravenous Q4HRS PRJAZMIN PereaDIvone   10 mg at 05/13/20 1159   • hydrALAZINE (APRESOLINE) injection 10 mg  10 mg Intravenous Q6HRS PRLEON Lopez M.D.       • oxyCODONE immediate-release (ROXICODONE) tablet 5-10 mg  5-10 mg Oral Q4HRS PRJAZMIN PereaDIvone   5 mg at 05/19/20 1346   • morphine (pf) 4 MG/ML injection 2-4 mg  2-4 mg Intravenous Q4HRS PRJAZMIN PereaDIvone   4 mg at 05/18/20 0847       Recommendations:  1. D/C hydralazine, never used.  2. D/C miralax, pt refusing   3. D/C promethazine suppository, never used.  4. Change docusate-senna to 2 tab qday based on pt usage.     Discussed recommendations with jacob Durbin to make above changes.     Jessa Valle, PharmD, BCOP

## 2020-05-19 NOTE — THERAPY
Physical Therapy   Daily Treatment     Patient Name: Goran Chavez  Age:  64 y.o., Sex:  male  Medical Record #: [id number]  Today's Date: 5/19/2020     Precautions  Precautions: (P) Fall Risk, Other (See Comments)  Comments: (P) wound vac L LE    Assessment    Pt seen for follow PT session to address discharge needs and recs. Pt completed all functional mobility with SPV and use of FWW. Pt demonstrated adequate strength and balance needed to negotiate 5 steps into and out of house. Pt educated on guarding from family for safety on stairs and importance of use of FWW for all functional mobility. No further PT needs at this time. Rec discharge home with home health services.     Plan    Discharge secondary to goals met.    Discharge recommendations:  Recommend use of FWW and home health transitional care for continued physical therapy services.        05/19/20 1133   Gait Analysis   Gait Level Of Assist Supervised   Assistive Device Front Wheel Walker   Distance (Feet) 100   # of Times Distance was Traveled 1   Deviation Shuffled Gait;Bradykinetic   Weight Bearing Status WBAT on L LE   Skilled Intervention Verbal Cuing;Sequencing;Compensatory Strategies   Comments pt ambulated with increased speed and safety. Unable to negotiate stairs at this time due to isolation, pt demonstrated adequate strength and balance to negotiate 6 steps to enter house with 1HR and SPV from family.   Bed Mobility    Supine to Sit Supervised   Sit to Supine Supervised   Scooting Supervised   Functional Mobility   Sit to Stand Supervised   Mobility EOB and ambulated in room   Skilled Intervention Verbal Cuing;Compensatory Strategies   Short Term Goals    Short Term Goal # 1 Patient will be able to ambulate 2 steps at supervision level in 6tx in order to get into home safely   Goal Outcome # 1 Goal met   Anticipated Discharge Equipment   DC Equipment Front-Wheel Walker       Salma Pathak PT, DPT  Pager [phone]

## 2020-05-19 NOTE — ASSESSMENT & PLAN NOTE
With surrounding cellulitis improving. Arterial US negative for arterial insufficiency.  - s/p washout on 4/22, 4/24, 4/27 and 5/4  - ID and orthopedic surgery following, appreciate recs  Completed course of antibiotics per ID  - continue with wound care; per LPS, tendon still visible so recommending surgery for skin graft be postponed until better healing

## 2020-05-20 ENCOUNTER — PATIENT OUTREACH (OUTPATIENT)
Dept: HEALTH INFORMATION MANAGEMENT | Facility: OTHER | Age: 65
End: 2020-05-20

## 2020-05-20 ENCOUNTER — HOME HEALTH ADMISSION (OUTPATIENT)
Dept: HOME HEALTH SERVICES | Facility: HOME HEALTHCARE | Age: 65
End: 2020-05-20
Payer: MEDICAID

## 2020-05-20 LAB
BASOPHILS # BLD AUTO: 0.9 % (ref 0–1.8)
BASOPHILS # BLD: 0.07 K/UL (ref 0–0.12)
EOSINOPHIL # BLD AUTO: 0.44 K/UL (ref 0–0.51)
EOSINOPHIL NFR BLD: 5.6 % (ref 0–6.9)
ERYTHROCYTE [DISTWIDTH] IN BLOOD BY AUTOMATED COUNT: 50.7 FL (ref 35.9–50)
HCT VFR BLD AUTO: 22.8 % (ref 42–52)
HGB BLD-MCNC: 7.4 G/DL (ref 14–18)
HGB RETIC QN AUTO: 29.4 PG/CELL (ref 29–35)
IMM GRANULOCYTES # BLD AUTO: 0.29 K/UL (ref 0–0.11)
IMM GRANULOCYTES NFR BLD AUTO: 3.7 % (ref 0–0.9)
IMM RETICS NFR: 57.7 % (ref 9.3–17.4)
IRON SATN MFR SERPL: 15 % (ref 15–55)
IRON SERPL-MCNC: 28 UG/DL (ref 50–180)
LYMPHOCYTES # BLD AUTO: 1.82 K/UL (ref 1–4.8)
LYMPHOCYTES NFR BLD: 23.2 % (ref 22–41)
MCH RBC QN AUTO: 30.8 PG (ref 27–33)
MCHC RBC AUTO-ENTMCNC: 32.5 G/DL (ref 33.7–35.3)
MCV RBC AUTO: 95 FL (ref 81.4–97.8)
MONOCYTES # BLD AUTO: 1.52 K/UL (ref 0–0.85)
MONOCYTES NFR BLD AUTO: 19.3 % (ref 0–13.4)
NEUTROPHILS # BLD AUTO: 3.72 K/UL (ref 1.82–7.42)
NEUTROPHILS NFR BLD: 47.3 % (ref 44–72)
NRBC # BLD AUTO: 0.26 K/UL
NRBC BLD-RTO: 3.3 /100 WBC
PLATELET # BLD AUTO: 229 K/UL (ref 164–446)
PMV BLD AUTO: 9.5 FL (ref 9–12.9)
RBC # BLD AUTO: 2.4 M/UL (ref 4.7–6.1)
RETICS # AUTO: 0.04 M/UL (ref 0.04–0.06)
RETICS/RBC NFR: 1.7 % (ref 0.8–2.1)
TIBC SERPL-MCNC: 189 UG/DL (ref 250–450)
UIBC SERPL-MCNC: 161 UG/DL (ref 110–370)
WBC # BLD AUTO: 7.9 K/UL (ref 4.8–10.8)

## 2020-05-20 PROCEDURE — 700105 HCHG RX REV CODE 258: Performed by: INTERNAL MEDICINE

## 2020-05-20 PROCEDURE — A7000 DISPOSABLE CANISTER FOR PUMP: HCPCS | Performed by: INTERNAL MEDICINE

## 2020-05-20 PROCEDURE — 83550 IRON BINDING TEST: CPT

## 2020-05-20 PROCEDURE — 700102 HCHG RX REV CODE 250 W/ 637 OVERRIDE(OP): Performed by: HOSPITALIST

## 2020-05-20 PROCEDURE — 85046 RETICYTE/HGB CONCENTRATE: CPT

## 2020-05-20 PROCEDURE — 99232 SBSQ HOSP IP/OBS MODERATE 35: CPT | Performed by: INTERNAL MEDICINE

## 2020-05-20 PROCEDURE — A9270 NON-COVERED ITEM OR SERVICE: HCPCS | Performed by: INTERNAL MEDICINE

## 2020-05-20 PROCEDURE — 36415 COLL VENOUS BLD VENIPUNCTURE: CPT

## 2020-05-20 PROCEDURE — A9270 NON-COVERED ITEM OR SERVICE: HCPCS | Performed by: HOSPITALIST

## 2020-05-20 PROCEDURE — 700102 HCHG RX REV CODE 250 W/ 637 OVERRIDE(OP): Performed by: INTERNAL MEDICINE

## 2020-05-20 PROCEDURE — 97535 SELF CARE MNGMENT TRAINING: CPT

## 2020-05-20 PROCEDURE — 700102 HCHG RX REV CODE 250 W/ 637 OVERRIDE(OP): Performed by: FAMILY MEDICINE

## 2020-05-20 PROCEDURE — 700111 HCHG RX REV CODE 636 W/ 250 OVERRIDE (IP): Performed by: INTERNAL MEDICINE

## 2020-05-20 PROCEDURE — 83540 ASSAY OF IRON: CPT

## 2020-05-20 PROCEDURE — 770021 HCHG ROOM/CARE - ISO PRIVATE

## 2020-05-20 PROCEDURE — 97606 NEG PRS WND THER DME>50 SQCM: CPT

## 2020-05-20 PROCEDURE — 85025 COMPLETE CBC W/AUTO DIFF WBC: CPT

## 2020-05-20 PROCEDURE — 99232 SBSQ HOSP IP/OBS MODERATE 35: CPT | Performed by: NURSE PRACTITIONER

## 2020-05-20 PROCEDURE — A9270 NON-COVERED ITEM OR SERVICE: HCPCS | Performed by: FAMILY MEDICINE

## 2020-05-20 RX ADMIN — OXYCODONE 10 MG: 5 TABLET ORAL at 05:24

## 2020-05-20 RX ADMIN — OXYCODONE 10 MG: 5 TABLET ORAL at 11:06

## 2020-05-20 RX ADMIN — FERROUS GLUCONATE 324 MG: 324 TABLET ORAL at 08:47

## 2020-05-20 RX ADMIN — SODIUM CHLORIDE 125 MG: 9 INJECTION, SOLUTION INTRAVENOUS at 17:02

## 2020-05-20 RX ADMIN — ENOXAPARIN SODIUM 40 MG: 100 INJECTION SUBCUTANEOUS at 15:30

## 2020-05-20 RX ADMIN — SERTRALINE HYDROCHLORIDE 50 MG: 50 TABLET ORAL at 05:16

## 2020-05-20 RX ADMIN — MORPHINE SULFATE 4 MG: 4 INJECTION INTRAVENOUS at 10:04

## 2020-05-20 RX ADMIN — OXYCODONE 10 MG: 5 TABLET ORAL at 15:30

## 2020-05-20 RX ADMIN — ATORVASTATIN CALCIUM 40 MG: 40 TABLET, FILM COATED ORAL at 05:16

## 2020-05-20 RX ADMIN — LOSARTAN POTASSIUM 25 MG: 25 TABLET, FILM COATED ORAL at 05:16

## 2020-05-20 RX ADMIN — OXYCODONE 10 MG: 5 TABLET ORAL at 21:28

## 2020-05-20 RX ADMIN — DOCUSATE SODIUM 50 MG AND SENNOSIDES 8.6 MG 2 TABLET: 8.6; 5 TABLET, FILM COATED ORAL at 05:16

## 2020-05-20 RX ADMIN — AMLODIPINE BESYLATE 10 MG: 10 TABLET ORAL at 05:16

## 2020-05-20 ASSESSMENT — COGNITIVE AND FUNCTIONAL STATUS - GENERAL
DRESSING REGULAR LOWER BODY CLOTHING: A LITTLE
DAILY ACTIVITIY SCORE: 22
HELP NEEDED FOR BATHING: A LITTLE
SUGGESTED CMS G CODE MODIFIER DAILY ACTIVITY: CJ

## 2020-05-20 ASSESSMENT — ENCOUNTER SYMPTOMS
CARDIOVASCULAR NEGATIVE: 1
PSYCHIATRIC NEGATIVE: 1
CONSTITUTIONAL NEGATIVE: 1
NEUROLOGICAL NEGATIVE: 1
GASTROINTESTINAL NEGATIVE: 1
MUSCULOSKELETAL NEGATIVE: 1
RESPIRATORY NEGATIVE: 1

## 2020-05-20 NOTE — DISCHARGE PLANNING
Anticipated Discharge Disposition:   · Home with wound vac and home health and outpatient wound clinic services.   Action:   · [facility] unable to schedule appt until June 5, 2020. SAMUELW updated KEV Keith with LPS. She inquired if a wound vac rep can help the HHC RN with the first changes in order for pt to be able to discharge home with services.  · Follow up call to Yasmine with [employer] to determine if this would be possible. Indicated LSW would need to check in with Evelyn [phone] or Mihaela [phone] with agency. LSW left VM with Mihaela to follow up.   · LSW received return call from Mihaela shortly after VM. Per Mihaela this should not be an issue. She indicated she works with Nasrin at [employer] frequently so will also reach out to her but anticipates this would be doable.   · Outreach call also placed to [facility] to determine if this would be acceptable for their agency. Nasrin gone for the day. LSW left message requesting follow up for tomorrow.     Barriers to Discharge:   · Determination if C can meet full wound care needs until appt on June 5 with wound clinic.  · Delivery of wound vac.   Plan:   · Care coordination will continue to follow up and provide assistance with discharge plans/barriers as needed.

## 2020-05-20 NOTE — DISCHARGE PLANNING
Agency/Facility Name: [facility]  Spoke To: Nasrin  Outcome: Referral received will need to know what day pt will be seen at wound clinic.

## 2020-05-20 NOTE — DISCHARGE PLANNING
Anticipated Discharge Disposition:   · TBD: Home with wound vac and outpatient wound care vs C wound care     Action:   · Pt denied for HHC from [facility] due to agency is at capacity for wound care pts. There are very limited agencies able/willing to accept pt's insurance Medicaid FFS. Will attempt alternative agency [facility].   · LSW provided update to pt's daughter, Megan and discussed possibility that pt may need SNF. Daughter indicated family has had bad experiences with SNF in the past and is not interested in this option, if it can be avoided. She indicated only facility she thinks pt would be willing to concern would be [facility]. LSW discussed that his facility does not accept Medicaid so would likely not be an option.   · At this time daughter would like to continue to look into [facility] options and outpatient follow up, possibly with [facility] wound care clinic.   · CHOICE completed for [facility].   · LSW will look into options pt would have at [facility].   · Per APRN ideally would like [facility] services set up to visit pt 2x week for wound care with 1 x week visit with outpatient wound clinic for wound vac changes. If HHC is unable to be set up pt would need to be seen at outpatient wound care clinic 3x weekly. -Concern that clinic may not have appts available.   · LSW placed call to scheduling ext [phone] to request wound care clinic be scheduled.  Coby reported she reached out to [facility] and office reported pt's wound is too complex for needs to be met at facility and feel pt needs SNF placement for continued wound care management.     Barriers to Discharge:   · [facility] approval for wound care  · Determine if alternative outpatient wound clinic can manage pt's care needs.   · May need to look into SNF placement as option for continued wound care.   Plan:   · Care coordination will continue to follow up and provide assistance  with discharge plans/barriers.

## 2020-05-20 NOTE — PROGRESS NOTES
LIMB PRESERVATION SERVICE   POST SURGICAL PROGRESS NOTE      HPI:  64 y.o.  with a past medical history that includes hypertension, hepatitis C, kidney disease, polysubstance abuse, nondiabetic Charcot's deformity left foot, and chronic venous stasis ulcers bilateral lower extremities admitted 4/16/2020 for Cellulitis of left lower extremity.        LPS has been consulted for evaluation of left lower leg venous stasis ulcers.  The patient has a history of chronic venous stasis ulcers and has been seen in outpatient wound clinic for treatment as well as infectious disease in October 2019 for an infected skin ulcer.  He reports that his previous venostasis ulcers had resolved approximately 1 month ago and he was discharged from the outpatient wound clinic.  He states that 1 week ago (4/12/2020) he was in the shower and noticed that he developed an ulcer on the anterior aspect of his lower leg distal to his knee and then the ulcer had continued to worsen from there radiating to the back of his left lower leg and development of new ulcers down his left lateral lower leg to his ankle.  He reports having fever, chills, and a large amount of clear drainage from his ulcers.  He states that 4/15/2020 his left lower leg began to swell and developed redness.  He was seen in the outpatient wound center where he was found to have significant swelling and erythema to his left lower extremity, weeping from superficial tissue, and foul-smelling odor.    Patient proceeded to ED.  ESR 97, CRP 31.7.  He was started on vancomycin and Zosyn IV and admitted under hospital services.     SURGERY DATE: 04/22/20  PROCEDURE: with Dr. Israel  1.  Left leg irrigation and debridement, multiple compartments, deep down to   the level of muscle.  2.  Left fasciotomy anterior and posterior compartments.  3.  Left placement of wound VAC.    SURGERY DATE: 04/24/20  PROCEDURE: with Dr. Israel  1.  Left leg irrigation and debridement, multiple  compartments.  2.  Placement of wound VAC.    SURGERY DATE: 04/27/20  PROCEDURE: with Dr. John  1.  Left leg irrigation and debridement, multiple compartments.  2.  Placement of wound VAC.    SURGERY DATE: 05/4/2020 with Dr. Israel and Dr. John  PROCEDURE: Left leg irrigation and debridement.         4/22/2020: Patient denies fevers, chills, nausea, vomiting.  Complains of pain with inspection of lower leg ulcers.  Tract with purulent drainage noted yesterday by wound team.  Repeat CT ordered and was negative for abscess or osteomyelitis.  Patient has been n.p.o. since last night.  Dressings that were changed yesterday are completely saturated today.   Patient also has Charcot foot to left foot and complains of severe pain to foot when walking.  4/28/2020: Patient denies fevers, chills, nausea, vomiting.  Pain well controlled.   LPS was called by bedside RN to assess left LE.  Wound VAC placed last night, clotted off when patient arrived to floor, troubleshoot by night RN, unable to resolve.  NOC RN removed drape (not the foam) and the wound started bleeding.  NOC held pressure to wound bed, and applied pressure dressing.    4/29/2020: Patient's Left LE dressing intact, removed pressure dressing with wound care RN, Oriana Barcenas.  Still open bleeding with light trickle of, used AgNO3 to help stop small bleeds, larger bleeds pressure dressing replaced by wound RNOriana.   5/01/2020: Patient denies fever, chills, n/v.  No complaints of pain.  Dressing removed and wounds assessed.  Wound VAC was placed on Left LE Anterior/Lateral wound.  2 layer compression wrap placed to left LE.  Plan for patient to go to surgery on Monday.   5/5/2020: POD #1 S/P left leg I&D.  Denies any fevers, chills, nausea, vomiting.  Seen with wound team during VAC placement to left lower leg.  5/6/2020: POD #2 S/P left leg I&D.  N.p.o. for possible surgery.  Surgery canceled.  Reschedule for next week.  Wound team to apply veraflo to left  lateral calf ulcer  5/8/2020: POD #4 S/P left leg I&D.  N.p.o. for possible surgery today.  LLE ulcers seen with wound team.  Tendon visible, viable, clean to lateral calf.  Tract proximally has decreased.  Plan for split-thickness skin graft next week.  Continue with veraflo to lateral calf and regular VAC to posterior medial lower leg.  5/11/2020: POD #7 S/P left leg I&D.  Seen with Wound team for veraflo VAC change. Tendon remains visible to lateral calf, tract at 11:00 of 2.5cm.  Postpone STSG until next week.  5/13/2020: POD #9.  Seen during veraflo wound VAC change with wound team.  Tract remains the same at 2.5 cm, tendon visible with minimal granular buds.  Surgery next week with Dr. Israel for possible split-thickness skin graft  5/18/2020: POD#14. Patient seen during wound vac change with wound care team. Tract remains the same at 2.5cm at 11:00, tendon visible with minimal granular buds- minimally changed from previous images on 5/13/2020. Notified orthopedic surgeon- surgery today cancelled.   5/20/2020: POD # 16. Seen with wound team for veraflo VAC change to LLE. Tract remains at 2.5cm. increase granular buds to distal aspect of tendon to lateral calf. Continue with veraflo      PERTINENT LPS RESULTS:   No new pertinent results    SURGICAL SITE EXAM:      /92   Pulse 75   Temp 36.8 °C (98.2 °F) (Temporal)   Resp 18   Ht 1.829 m (6')   Wt 82.2 kg (181 lb 3.5 oz)   SpO2 93%   BMI 24.58 kg/m²     Pedal Pulses: palpable pulses  Sensation: LOPS  Foot warm to touch    Left lateral calf:   100% red granular tissue. Except where tendon is exposed. Minimal increase in granular buds to tendon.   2.5cm tract remains at 11:00 position.   No active drainage, erythema, or odor. Trace edema.       Left posterior medial ankle:  Small incision approximated with sutures.   Full thickness wound with 90% red granular tissue, 10% pale tissue.   No erythema, odor, or active drainage.   Trace edema.       Left  anterior lower leg ulcer:  Full thickness ulcer with 100% red granular tissue.   No erythema, drainage or odor.   Trace edema.         Wound care completed by Oriana Barcenas RN wound team.  See wound care note and flowsheet for additional details.               INFECTION MANAGEMENT:    Results from last 7 days   Lab Units 05/20/20  0121 05/19/20  0243 05/18/20  0245 05/17/20  0253 05/16/20  1003 05/16/20  0110   WBC 1501 K/uL 7.9 6.9 6.7 5.9 5.9 5.7   PLATELET COUNT 1518 K/uL 229 195 168 149* 144* 143*     Wound culture results:   Results     Procedure Component Value Units Date/Time    SARS-CoV-2, PCR (In-House) [[id number]] Collected:  05/17/20 1741    Order Status:  Completed Updated:  05/17/20 1855     SARS-CoV-2 Source NP Swab     SARS-CoV-2 by PCR NotDetected     Comment: [facility] providers: PLEASE REFER TO DE-ESCALATION AND RETESTING PROTOCOL  on [email or web address]  **The [employer] GeneXpert Xpress SARS-CoV-2 Test has been made available for  use under the Emergency Use Authorization (EUA) only.         Narrative:       [id number] LUISA WU    COVID/SARS CoV-2 [[id number]] Collected:  05/17/20 1741    Order Status:  Completed Specimen:  Respirate from Nasopharyngeal Updated:  05/17/20 1753     COVID Order Status Received    Narrative:       [id number] LUISA WU               ASSESSMENT/PLAN:   Patient POD # 16 S/P left lower leg I&D.  Patient has had 4 I&D's to this area thus far.  Patient is progressing slowly.  The tract to the 11:00 position remains unchanged. Granular tissue to all wound beds, continues to develop. Tendon to left lateral leg exposed with increase in budding over distal aspect of tendon. Continue with veraflo wound vac.           Wound care:  DC sutures to L posterior ankle  - wound team to manage wound care.   -Regular wound VAC to left posterior medial lower leg ulcers   -VERAFLO to left lateral calf and anterior proximal leg, cover tendon with mepitel one in anticipation  veraflo VAC is discontinued and switched to regular VAC   -2 layer compression wrap to LLE to control edema  -veraflo VAC change and 2 layer compression wrap 3x/wk     Vascular status:   - Palpable pulses    Antibiotics:   -meropenem and linezolid discontinued by ID 5/18/2020  ID signed off.     Weight Bearing Status:   -Weight bearing as tolerated    Offloading:   -offload with pillows    PT/OT :   -involved, last seen by PT 5/19/2020      Plan to return to O.R.:   No further plans for surgery while IP. Plan for STSG vs secondary closure in OP setting      DISCHARGE PLAN:    Disposition: recommend SNF or LTACH for continued veraflo VAC therapy. Pt does not meet skilled need for SNF per SW.    -Since SNF is not an option recommend home with HH 2x/wk and [facility] outpt wound care clinic 1x/wk with provider.  Referral placed for wound care clinic.   Informed by Brianna ISSA that pt was denied by [facility] and SW recommended I place another referral for wound clinic appt 3x/wk for VAC changes and 2 layer compression wrap. Notified Mine AVINA at wound clinic of denial by HH. SW to contact scheduling department to coordinate wound care clinic appts 3x/wk for VAC changes and 2 layer compression wrap. SW attempting alternative HH.     Follow-up: Dr. Israel once tendon to lateral calf is covered for STSG    Discussed with: pt, RN, , Dr. Israel, Oriana Barcenas RN with wound team, Dr. Arceo, Brianna Hernández, A.P.R.N.    If any questions or concerns, please call [phone]

## 2020-05-20 NOTE — PROGRESS NOTES
Internal Medicine Daily Progress Note    Date of Service  5/20/2020  Chief Complaint  64 y.o. male admitted 4/16/2020 with [facility]  Pt with past medical history significant for venous stasis dermatitis and stasis ulcers of bilateral lower extremities who presented on 4/16/2020 with left leg redness and swelling.  Patient was previously followed by wound care as an outpatient but was discharged when ulcers had resolved, but ulcers came back 1 week prior to presentation.  Patient has had multiple incision and drainages with a wound VAC in place. Intraoperative wound culture grew Pseudomonas and group A strep. Current plans for skin graft once more healing has occurred.     Interval Problem Update  Was on Zosyn, Meropenem, d/c'd by ID 5/18  Pending skin graft at some future date     Consultants/Specialty  Infectious disease  LPS  Ortho     Code Status  Full     Disposition  HH unlikely d/t appt availability with [facility] not until June  Exploring other SNF, other HH/wound care options    Review of Systems  Review of Systems   Constitutional: Negative.    Respiratory: Negative.    Cardiovascular: Negative.    Gastrointestinal: Negative.    Genitourinary: Negative.    Musculoskeletal: Negative.    Neurological: Negative.    Psychiatric/Behavioral: Negative.    All other systems reviewed and are negative.       Physical Exam  Temp:  [36.5 °C (97.7 °F)-37.1 °C (98.7 °F)] 36.7 °C (98.1 °F)  Pulse:  [66-75] 70  Resp:  [18] 18  BP: (128-148)/(77-92) 145/90  SpO2:  [93 %-95 %] 93 %    Physical Exam  Vitals signs and nursing note reviewed. Exam conducted with a chaperone present.   Constitutional:       Appearance: Normal appearance.   Cardiovascular:      Rate and Rhythm: Normal rate and regular rhythm.      Heart sounds: Normal heart sounds.   Pulmonary:      Effort: Pulmonary effort is normal.      Breath sounds: Normal breath sounds.   Abdominal:      General: Abdomen is flat.       Palpations: Abdomen is soft.   Skin:     Findings: Lesion (wound vac on LLE, bilat LE wrapped) present.   Neurological:      Mental Status: He is alert and oriented to person, place, and time. Mental status is at baseline.   Psychiatric:         Behavior: Behavior normal.         Fluids    Intake/Output Summary (Last 24 hours) at 5/20/2020 1445  Last data filed at 5/20/2020 1000  Gross per 24 hour   Intake 720 ml   Output 850 ml   Net -130 ml       Laboratory  Recent Labs     05/18/20  0245 05/19/20  0243 05/20/20  0121   WBC 6.7 6.9 7.9   RBC 2.46* 2.47* 2.40*   HEMOGLOBIN 7.5* 7.5* 7.4*   HEMATOCRIT 22.9* 23.3* 22.8*   MCV 93.1 94.3 95.0   MCH 30.5 30.4 30.8   MCHC 32.8* 32.2* 32.5*   RDW 50.1* 51.0* 50.7*   PLATELETCT 168 195 229   MPV 9.5 9.3 9.5     Recent Labs     05/18/20  0245 05/19/20  0243   SODIUM 137 139   POTASSIUM 4.1 4.3   CHLORIDE 101 102   CO2 26 27   GLUCOSE 105* 103*   BUN 13 14   CREATININE 0.84 0.83   CALCIUM 8.4* 8.1*                   Imaging  IR-US GUIDED PIV   Final Result    Ultrasound-guided PERIPHERAL IV INSERTION performed by    qualified nursing staff as above.            CT-EXTREMITY, LOWER WITH LEFT   Final Result         1. No evidence of osteomyelitis.   2. Cellulitis and soft tissue organs of the left lower leg. Subcutaneous air without drainable abscess.   3. Suspected pes planovalgus deformity. Multifocal osteoarthrosis of the midfoot.      US-RENAL   Final Result      1.  Right kidney appears smaller than left kidney which could be due to atrophy.      2.  No hydronephrosis.      3.  Incidentally noted small right pleural effusion.      CT-EXTREMITY, LOWER WITH LEFT   Final Result      1.  Extensive subcutaneous edema with overlying skin thickening. Inflammation extends along the fascial planes with curvilinear fluid superficial to the gastrocnemius muscle. No soft tissue gas to suggest necrotizing fasciitis.      2.  Osteopenia           Assessment/Plan  * Venous stasis ulcer  of left lower extremity (HCC)- (present on admission)  Assessment & Plan  With surrounding cellulitis improving. Arterial US negative for arterial insufficiency.  - s/p washout on 4/22, 4/24, 4/27 and 5/4  - ID and orthopedic surgery following, appreciate recs  Completed course of antibiotics per ID  - continue with wound care; per LPS, tendon still visible so recommending surgery for skin graft be postponed until better healing    CKD (chronic kidney disease) stage 3, GFR 30-59 ml/min ([facility])- (present on admission)  Assessment & Plan  Renal function stable, with creat nl    Essential hypertension- (present on admission)  Assessment & Plan  Controlled with amlodipine, losartan      Normocytic anemia- (present on admission)  Assessment & Plan  Likely anemia of chronic disease and in the setting of slow blood loss from his LE wound, which has now improved.   No active signs of bleeding.    Patient is hemodynamically stable and asymptomatic  Will continue to trend with CBC  s/p 1U PRBC on 4/29, 5/5, 5/16  Continue iron supplementation  Transfuse to maintain hemoglobin greater than 7.    Methamphetamine abuse (HCC)- (present on admission)  Assessment & Plan  Positive UDS for amphetamine, oxycodone and marijuana.  - continued counseling and education  Not candidate for outpatient infusion for antibiotics    Alcohol abuse- (present on admission)  Assessment & Plan  Past CIWA requirements       VTE prophylaxis: start lovenox

## 2020-05-20 NOTE — DISCHARGE PLANNING
Anticipated Discharge Disposition:   · TBD: Home with wound vac and outpatient wound care vs [facility] wound care     Action:   · Outreach call to [facility] to determine if clinic would be able to address care needs for pt. Per representative [facility] does not have the capacity to take on a wound vac client at this time. Suggested reaching out to [place] [facility].   · Outreach call to [place] [facility] [phone]. Spoke with Yesenia who reported they could potential see pt in office but indicated they have no available until the first of the month.     Barriers to Discharge:   · [facility] approval for wound care  · Determine if alternative outpatient wound clinic can manage pt's care needs.   · May need to look into SNF placement as option for continued wound care.     Plan:   · Care coordination will continue to follow up and provide assistance with discharge plans/barriers.

## 2020-05-20 NOTE — PROGRESS NOTES
Bedside report received from Keren ROGERS . Assumed care of pt at 0645 . Pt is awake at this time with no signs of distress. Plan of care discussed with pt. Pt is A&O x 4. Pt is on RA. Bed alarm is on, bed in lowest position, bed rails up x 2, belongings and call light within reach. Hourly rounding in place.

## 2020-05-20 NOTE — FACE TO FACE
Face to Face Supporting Documentation - Home Health    The encounter with this patient was in whole or in part the primary reason for home health admission.    Date of encounter:   Patient:                    MRN:                       YOB: 2020  Goran Chavez  [id number]  1955     Home health to see patient for:  Wound Care    Skilled need for:  Comment: LLE wound care    Skilled nursing interventions to include:  Wound Care    Homebound status evidenced by:  Needs the assistance of another person in order to leave the home. Leaving home requires a considerable and taxing effort. There is a normal inability to leave the home.    Community Physician to provide follow up care: Marcela Ch M.D.     Optional Interventions? No      I certify the face to face encounter for this home health care referral meets the CMS requirements and the encounter/clinical assessment with the patient was, in whole, or in part, for the medical condition(s) listed above, which is the primary reason for home health care. Based on my clinical findings: the service(s) are medically necessary, support the need for home health care, and the homebound criteria are met.  I certify that this patient has had a face to face encounter by myself.  Ghada Arceo M.D. - NPI: [id number]

## 2020-05-20 NOTE — THERAPY
"Occupational Therapy  Daily Treatment     Patient Name: Goran Chavez  Age:  64 y.o., Sex:  male  Medical Record #: [id number]  Today's Date: 5/20/2020     Precautions  Precautions: (P) Fall Risk  Comments: (P) wound vac L LE    Subjective    \"I'd love to go out to [employer] again\"     Objective       05/20/20 1521   Activities of Daily Living   Grooming Supervision;Standing  (shaved, brushed teeth, washed face)   Bathing Supervision  (standing bedbath due to wound vac)   Lower Body Dressing Supervision  (R sock)   Toileting Supervision  (standing voiding)   Functional Mobility   Sit to Stand Supervised   Bed, Chair, Wheelchair Transfer Supervised   Toilet Transfers Supervised       Assessment    Pt met acute OT goals. Shower not performed, however pt performed 20 min of standing grooming, high/low retrieval, and made his bed indicating that he is functionally capable to shower at SPV level. Will remain available for DC needs only.     Plan    Will remain available for DC needs only    Discharge recommendations:  Recommend home health for continued occupational therapy services.   "

## 2020-05-20 NOTE — PROGRESS NOTES
Assumed care at 1930, report received from Suzy ROGERS.  Pt A&O x  4, states pain is 06/10.Wound vac in place. Bed locked, 2 rails up, bed in lowest position. Call light in place, belongings at bedside, no needs at this time and hourly rounding in place.

## 2020-05-20 NOTE — DISCHARGE PLANNING
Received Choice form at [phone]  Agency/Facility Name: [facility]   Referral sent per Choice form @ [phone]

## 2020-05-20 NOTE — CARE PLAN
Problem: Pain Management  Goal: Pain level will decrease to patient's comfort goal  Outcome: PROGRESSING AS EXPECTED  Note: Medication provided per MAR.     Problem: Skin Integrity  Goal: Risk for impaired skin integrity will decrease  Outcome: PROGRESSING AS EXPECTED  Intervention: Implement precautions to protect skin integrity in collaboration with the interdisciplinary team  Flowsheets  Taken 5/20/2020 0218  Skin Preventative Measures: Pillows in Use for Support / Positioning  PT / OT Involved in Care: Physical Therapy Involved  Moisturizers: Moisturizer   Taken 5/19/2020 2045  Patient Turns / Repositioning: Patient Turns Self from Side to Side  Assistance / Tolerance for Turning/Repositioning: Tolerates Well

## 2020-05-20 NOTE — WOUND TEAM
[facility]  Inpatient Services  Wound and Skin Care Follow Up    Admission Date: 4/16/2020     Last order of IP CONSULT TO WOUND CARE was found on 4/24/2020 from Hospital Encounter on 4/16/2020     HPI, PMH, SH: Reviewed    Unit where seen by Wound Team: [id number]/[id number]     WOUND CONSULT/FOLLOW UP RELATED TO:  LLE Vac change    Self Report / Pain Level:  Pre-medicated with IV medication. Minimal pain noted.       OBJECTIVE:  Two layer in place, vac dressings in place and sponge compressed.    WOUND TYPE, LOCATION, CHARACTERISTICS (Pressure Injuries: location, stage, POA or date identified)     Negative Pressure Wound Therapy 05/05/20 Surgical (Active)   NPWT Pump Mode / Pressure Setting Intermittent;125 mmHg 05/20/20    Dressing Type Black Foam (Veraflo)    Canister Changed No    Output (mL) 300 mL    NEXT Dressing Change/Treatment Date 05/22/20    VAC VeraFlo Irrigant Normal Saline    VAC VeraFlo Soak Time (mins) 6    VAC VeraFlo Instill Volume (ml) 18    VAC VeraFlo - Therapy Time (hrs) 2    VAC VeraFlo Pressure (mm/Hg) Intermittent;125 mmHg         Wound 05/05/20 Full Thickness Wound Leg Lateral;Lower;Posterior;Medial Left open complicated surgical (Active)   Wound Image    05/18/20   Site Assessment Red;Pink;Early/partial granulation 05/20/20   Periwound Assessment Intact;Hemosiderin Staining    Margins Defined edges    Closure Secondary intention    Drainage Amount Scant    Drainage Description Serosanguineous    Treatments Cleansed;Site care    Wound Cleansing Approved Wound Cleanser    Periwound Protectant Benzoin;Drape;Paste Ring    Dressing Cleansing/Solutions Normal Saline    Dressing Options Wound Vac    Dressing Changed Changed    Dressing Status Clean;Dry;Intact    Dressing Change/Treatment Frequency Monday, Wednesday, Friday, and As Needed    NEXT Dressing Change/Treatment Date 05/22/20    NEXT Weekly Photo (Inpatient Only) 05/25/20    Number of Sutures Removed 3    Non-staged Wound Description  Full thickness    Wound Length (cm) 22.7 cm 05/18/20    Wound Width (cm) 13.1 cm    Wound Depth (cm) 0.5 cm    Wound Surface Area (cm^2) 297.37 cm^2    Wound Volume (cm^3) 148.68 cm^3    Wound Healing % 28    Wound Bed Granulation (%) 100 %    Tunneling (cm) 2.5 cm    Tunneling Clock Position of Wound 11    Undermining (cm) 0 cm    Shape Irregular 05/20/20   Wound Odor None    Pulses Left;1+;DP;PT    Exposed Structures Tendon        Vascular:    BHUPINDER:   No results found.      Lab Values:    Lab Results   Component Value Date/Time    WBC 7.9 05/20/2020 01:21 AM    RBC 2.40 (L) 05/20/2020 01:21 AM    HEMOGLOBIN 7.4 (L) 05/20/2020 01:21 AM    HEMATOCRIT 22.8 (L) 05/20/2020 01:21 AM    CREACTPROT 14.44 (H) 04/22/2020 12:49 AM    SEDRATEWES 31 (H) 05/18/2020 02:45 AM    HBA1C 5.7 (H) 01/23/2020 11:22 AM        Culture:   Obtained,   Culture Results show:  Recent Results (from the past 720 hour(s))   CULTURE WOUND W/ GRAM STAIN    Collection Time: 04/22/20 12:03 PM    Specimen: Wound   Result Value Ref Range    Significant Indicator POS (POS)     Source WND     Site Left Leg     Culture Result - (A)     Gram Stain Result Moderate WBCs.  No organisms seen.       Culture Result (A)      Pseudomonas aeruginosa  Light growth  P.aeruginosa may develop resistance during prolonged therapy  with all antibiotics. Isolates that are initially susceptible  may become resistant within three to four days after  initiation of therapy. Testing of repeat isolates may be  warranted.      Culture Result (A)      Beta Hemolytic Streptococcus group A  Rare growth         Susceptibility    Pseudomonas aeruginosa - SHANON     Ceftazidime <=1 Sensitive mcg/mL     Ciprofloxacin >2 Resistant mcg/mL     Cefepime 4 Sensitive mcg/mL     Amikacin <=16 Sensitive mcg/mL     Gentamicin <=4 Sensitive mcg/mL     Tobramycin <=4 Sensitive mcg/mL     Meropenem <=1 Sensitive mcg/mL     Pip/Tazobactam 64 Sensitive mcg/mL     INTERVENTIONS BY WOUND TEAM:  Dressing  removed from lateral wound cleansed with wound cleanser. Marylou wound prepped with benzoin and paste ring. Mepitel to exposed tendon, if pt dc's before next change Mepitel will protect tendon.  Veraflo sponge applied to wound bed and up in to tunnel at 11 o'clock, and secured with drape, trac pad applied. Veraflo therapy initiated, instilling 18 ml of NS soaking for 4 minutes every 1 hours. Medial wound vac removed and cleansed with wound cleanser. Marylou wound prepped with skin prep and drape, regular vac sponge to wound bed. Secured with drape. Trac pad applied to lateral side and seal obtained and therapy resumed at 125mmHg Y connected. L leg then wrapped in 2 layer compression.     Interdisciplinary consultation: Patient, Bedside RN, MINERVA Rivera, Wound Tech    EVALUATION: Patient admitted with LLE redness and swelling, venous ulcers present, developed an abcess and gas trapped underneath surface, multiple I&D's and Necrotic tissue developed. Had to be excised on the lateral leg down to the fascia. LLE now presents with Lateral surgical incision wound and venous ulcers to the anterior leg/medial/posterior ankle.  Venous ulcers will benefit from NPWT to manage drainage, and encourage granulation tissue. Will continue regular NPWT on venous ulcer sites. Lateral incisional wound Veraflo NPWT to maintain a moist wound bed, manage drainage and encourage granulation.     Goals: Steady decrease in wound area and depth weekly.    NURSING PLAN OF CARE ORDERS (X):    Dressing changes: See Dressing Care orders: X  Skin care: See Skin Care orders: X  Rectal tube care: See Rectal Tube Care orders:   Other orders:    WOUND TEAM PLAN OF CARE:   Dressing changes by wound team:          Follow up 1-2 times weekly:               Follow up 3 times weekly:                NPWT change 3 times weekly:   X  Follow up as needed:       Other (explain):     Anticipated discharge plans:   LTACH:    X    SNF/Rehab:   X               Home Care:            Outpatient Wound Center:            Self Care:

## 2020-05-20 NOTE — DISCHARGE PLANNING
Anticipated Discharge Disposition:   · Home with wound vac and HHC vs outpatient wound care    Action:   · Spoke with provider Dr. Arceo related to pt's discharge plans. Indicting would like to set up HHC but need to discuss with pt's daughter, Megan.   · LSW placed outreach call to Megan. Megan indicated she would prefer pt to have the wound care at the outpatient clinic to minimize people in the home. She reported her mother, who she also cares recently got out of the hospital as well and they are trying to prevent possible exposure. She indicated they had a large patio and is interest if [facility] would be able to provide services to her father (pt) on the patio. Discussed that LSW can inquire with [facility] agency to determine if this would be an options. Daughter agreeable to referral to be sent to [facility].   · CHOICE completed per daughter and faxed to [facility] ext [phone].  · Received incoming call from Yasmine with [phone] indicating wound vac was approved via insurance and they just need plan of care ie home with HHC or outpatient services to arrange delivery of wound vac.  Barriers to Discharge:   · Determination of outpatient care ie HHC vs outpatient wound clinic     Plan:   · Care coordination will continue to follow up and provide assistance as needed with discharge plans/barriers.

## 2020-05-20 NOTE — DISCHARGE PLANNING
ATTN: Case Management  RE: Referral for Home Health    Reason for referral denial: currently at capacity for wound care patients.               Unfortunately, we are not able to accept this referral for the reason listed above. If further clarity is needed, our Transitional Care Specialists are available to discuss any barriers to service at [phone].      We look forward to collaborating with you in the future,  [facility]

## 2020-05-21 VITALS
HEART RATE: 71 BPM | RESPIRATION RATE: 18 BRPM | SYSTOLIC BLOOD PRESSURE: 132 MMHG | OXYGEN SATURATION: 98 % | WEIGHT: 181.22 LBS | TEMPERATURE: 97.9 F | DIASTOLIC BLOOD PRESSURE: 62 MMHG | BODY MASS INDEX: 24.55 KG/M2 | HEIGHT: 72 IN

## 2020-05-21 PROBLEM — E87.5 HYPERKALEMIA: Status: RESOLVED | Noted: 2020-04-26 | Resolved: 2020-05-21

## 2020-05-21 PROBLEM — K59.00 CONSTIPATION: Status: RESOLVED | Noted: 2020-04-25 | Resolved: 2020-05-21

## 2020-05-21 PROBLEM — F41.9 ANXIETY: Status: RESOLVED | Noted: 2018-03-07 | Resolved: 2020-05-21

## 2020-05-21 PROBLEM — F10.10 ALCOHOL ABUSE: Status: RESOLVED | Noted: 2018-07-11 | Resolved: 2020-05-21

## 2020-05-21 LAB
BASOPHILS # BLD AUTO: 0.9 % (ref 0–1.8)
BASOPHILS # BLD: 0.08 K/UL (ref 0–0.12)
EOSINOPHIL # BLD AUTO: 0.45 K/UL (ref 0–0.51)
EOSINOPHIL NFR BLD: 5.2 % (ref 0–6.9)
ERYTHROCYTE [DISTWIDTH] IN BLOOD BY AUTOMATED COUNT: 53.9 FL (ref 35.9–50)
ERYTHROCYTE [SEDIMENTATION RATE] IN BLOOD BY WESTERGREN METHOD: 22 MM/HOUR (ref 0–20)
HCT VFR BLD AUTO: 23.2 % (ref 42–52)
HGB BLD-MCNC: 7.3 G/DL (ref 14–18)
IMM GRANULOCYTES # BLD AUTO: 0.3 K/UL (ref 0–0.11)
IMM GRANULOCYTES NFR BLD AUTO: 3.4 % (ref 0–0.9)
LYMPHOCYTES # BLD AUTO: 1.8 K/UL (ref 1–4.8)
LYMPHOCYTES NFR BLD: 20.6 % (ref 22–41)
MCH RBC QN AUTO: 30.7 PG (ref 27–33)
MCHC RBC AUTO-ENTMCNC: 31.5 G/DL (ref 33.7–35.3)
MCV RBC AUTO: 97.5 FL (ref 81.4–97.8)
MONOCYTES # BLD AUTO: 1.31 K/UL (ref 0–0.85)
MONOCYTES NFR BLD AUTO: 15 % (ref 0–13.4)
NEUTROPHILS # BLD AUTO: 4.78 K/UL (ref 1.82–7.42)
NEUTROPHILS NFR BLD: 54.9 % (ref 44–72)
NRBC # BLD AUTO: 0.11 K/UL
NRBC BLD-RTO: 1.3 /100 WBC
PLATELET # BLD AUTO: 231 K/UL (ref 164–446)
PMV BLD AUTO: 9.8 FL (ref 9–12.9)
RBC # BLD AUTO: 2.38 M/UL (ref 4.7–6.1)
WBC # BLD AUTO: 8.7 K/UL (ref 4.8–10.8)

## 2020-05-21 PROCEDURE — 700105 HCHG RX REV CODE 258: Performed by: INTERNAL MEDICINE

## 2020-05-21 PROCEDURE — 99232 SBSQ HOSP IP/OBS MODERATE 35: CPT | Performed by: NURSE PRACTITIONER

## 2020-05-21 PROCEDURE — A9270 NON-COVERED ITEM OR SERVICE: HCPCS | Performed by: FAMILY MEDICINE

## 2020-05-21 PROCEDURE — 700102 HCHG RX REV CODE 250 W/ 637 OVERRIDE(OP): Performed by: HOSPITALIST

## 2020-05-21 PROCEDURE — A9270 NON-COVERED ITEM OR SERVICE: HCPCS | Performed by: INTERNAL MEDICINE

## 2020-05-21 PROCEDURE — 700102 HCHG RX REV CODE 250 W/ 637 OVERRIDE(OP): Performed by: INTERNAL MEDICINE

## 2020-05-21 PROCEDURE — 36415 COLL VENOUS BLD VENIPUNCTURE: CPT

## 2020-05-21 PROCEDURE — 700111 HCHG RX REV CODE 636 W/ 250 OVERRIDE (IP): Performed by: INTERNAL MEDICINE

## 2020-05-21 PROCEDURE — 85652 RBC SED RATE AUTOMATED: CPT

## 2020-05-21 PROCEDURE — 85025 COMPLETE CBC W/AUTO DIFF WBC: CPT

## 2020-05-21 PROCEDURE — 99238 HOSP IP/OBS DSCHRG MGMT 30/<: CPT | Performed by: INTERNAL MEDICINE

## 2020-05-21 PROCEDURE — 700102 HCHG RX REV CODE 250 W/ 637 OVERRIDE(OP): Performed by: FAMILY MEDICINE

## 2020-05-21 PROCEDURE — A9270 NON-COVERED ITEM OR SERVICE: HCPCS | Performed by: HOSPITALIST

## 2020-05-21 RX ORDER — NYSTATIN 100000 [USP'U]/G
POWDER TOPICAL 2 TIMES DAILY
Status: DISCONTINUED | OUTPATIENT
Start: 2020-05-21 | End: 2020-05-21 | Stop reason: HOSPADM

## 2020-05-21 RX ADMIN — LOSARTAN POTASSIUM 25 MG: 25 TABLET, FILM COATED ORAL at 05:08

## 2020-05-21 RX ADMIN — FERROUS GLUCONATE 324 MG: 324 TABLET ORAL at 08:23

## 2020-05-21 RX ADMIN — OXYCODONE 10 MG: 5 TABLET ORAL at 18:59

## 2020-05-21 RX ADMIN — OXYCODONE 10 MG: 5 TABLET ORAL at 08:28

## 2020-05-21 RX ADMIN — OXYCODONE 10 MG: 5 TABLET ORAL at 04:18

## 2020-05-21 RX ADMIN — DOCUSATE SODIUM 50 MG AND SENNOSIDES 8.6 MG 2 TABLET: 8.6; 5 TABLET, FILM COATED ORAL at 05:08

## 2020-05-21 RX ADMIN — ENOXAPARIN SODIUM 40 MG: 100 INJECTION SUBCUTANEOUS at 05:07

## 2020-05-21 RX ADMIN — OXYCODONE 10 MG: 5 TABLET ORAL at 14:38

## 2020-05-21 RX ADMIN — NYSTATIN: 100000 POWDER TOPICAL at 10:31

## 2020-05-21 RX ADMIN — ONDANSETRON 4 MG: 4 TABLET, ORALLY DISINTEGRATING ORAL at 15:46

## 2020-05-21 RX ADMIN — AMLODIPINE BESYLATE 10 MG: 10 TABLET ORAL at 05:08

## 2020-05-21 RX ADMIN — SODIUM CHLORIDE 125 MG: 9 INJECTION, SOLUTION INTRAVENOUS at 06:50

## 2020-05-21 RX ADMIN — SERTRALINE HYDROCHLORIDE 50 MG: 50 TABLET ORAL at 05:08

## 2020-05-21 RX ADMIN — ATORVASTATIN CALCIUM 40 MG: 40 TABLET, FILM COATED ORAL at 05:08

## 2020-05-21 NOTE — CARE PLAN
Problem: Discharge Barriers/Planning  Goal: Patient's continuum of care needs will be met  Intervention: Involve patient and significant other/support system in setting and prioritizing goals for hospital stay and discharge  Note: Pt will need further wound care. Anticipating home health with wound care, or a wound clinic.      Problem: Skin Integrity  Goal: Risk for impaired skin integrity will decrease  Intervention: Implement precautions to protect skin integrity in collaboration with the interdisciplinary team  Flowsheets (Taken 5/20/2020 2045)  Skin Preventative Measures: Pillows in Use for Support / Positioning  Friction Interventions: Draw Sheet / Pad Used for Repositioning  Patient Turns / Repositioning: Patient Turns Self from Side to Side  PT / OT Involved in Care:   Physical Therapy Involved   Occupational Therapy Involved  Moisturizers:   Moisturizer   Barrier Cream  Assistance / Tolerance for Turning/Repositioning: Tolerates Well  Note: Anticipate some nystatin for pt's groin area.

## 2020-05-21 NOTE — PROGRESS NOTES
Assumed care at 0700, report received from NOC RN.  Pt A&O x 4, states pain is 5/10. Will continue to monitor and reassess pain. Bed locked, 2 rails up, bed in lowest position. Call light in place, belongings at bedside, no needs at this time and hourly rounding in place.

## 2020-05-21 NOTE — CARE PLAN
Problem: Communication  Goal: The ability to communicate needs accurately and effectively will improve  Outcome: PROGRESSING AS EXPECTED   Pt communicates effectively and lets staff know his needs  Problem: Knowledge Deficit  Goal: Knowledge of disease process/condition, treatment plan, diagnostic tests, and medications will improve  Outcome: PROGRESSING AS EXPECTED   Pt shows good understanding of his disease process and treatment plan

## 2020-05-21 NOTE — DISCHARGE INSTRUCTIONS
Discharge Instructions per AGUSTINA Baker.    1.  Review your discharge Medication Reconciliation form as you may be provided with new prescriptions or changes to previously prescribed medications.  Be sure to take all of your prescribed medications exactly as directed.  Further questions can be directed to your local pharmacist or primary care provider (PCP).    2. Follow-up with your PCP.  An appointment may have already been scheduled for you.  Please review your discharge paperwork and locate this information.  Please be sure to call your PCP to cancel if you are unable to make this appointment or require schedule changes.  It is critical to to follow-up with your PCP to review hospital records and ensure any further diagnostic work-up, prescription refills, or referrals.     3. ACTIVITIES: After discharge from the hospital, you may resume routine activities including walking and going u/down stairs. However, no strenuous activity. For further clarification, call your PCP     4. DRIVING: You may drive whenever you are off pain medications and are able to perform the activities needed to drive, i.e. turning, bending, twisting, etc.     5. BOWEL FUNCTION: A few patients, after hospitalizations, will develop bowel irregularity. You could also experience constipation due to pain medication and decreased activity level. If you feel this is occurring, please take an over-the-counter laxative (Milk of Magnesia, Ex-Lax, Senokot, etc.) until the problem has resolved.     6. PAIN MEDICATION: You may be given a prescription for pain medication at discharge. Please take these as directed. It is important to remember not to take medications on an empty stomach as this may cause nausea/vomiting.  You can transition from the prescription-strength pain medication to over-the-counter medications as your pain improves, such as tylenol if you are medically cleared to do so.     7. WOUND CARE: You have been referred to the  wound care clinic at [facility] for ongoing wound care. Give them a call if you have not been contacted re: an appt already.    Return to ER if you develop recurrent chest pain, shortness of breath, bloody sputum, fever of 101.5 or greater, intractable nausea or vomiting, blood in the vomit or urine, intractable pain, new onset inability to ambulate, focal motor weakness, acute vision disturbance, acute speech disturbance, intractable abdominal pain, or any other worrisome symptoms.    Discharge Instructions    Discharged to home by car with relative. Discharged via wheelchair, hospital escort: Yes.  Special equipment needed: Wound VAC    Be sure to schedule a follow-up appointment with your primary care doctor or any specialists as instructed.     Discharge Plan:   Diet Plan: Discussed  Activity Level: Discussed  Confirmed Follow up Appointment: Appointment Scheduled  Confirmed Symptoms Management: Discussed  Medication Reconciliation Updated: Yes  Influenza Vaccine Indication: Not indicated: Previously immunized this influenza season and > 8 years of age    I understand that a diet low in cholesterol, fat, and sodium is recommended for good health. Unless I have been given specific instructions below for another diet, I accept this instruction as my diet prescription.   Other diet: N/A    Special Instructions: N/A    · Is patient discharged on Warfarin / Coumadin?   No     Depression / Suicide Risk    As you are discharged from this [facility], it is important to learn how to keep safe from harming yourself.    Recognize the warning signs:  · Abrupt changes in personality, positive or negative- including increase in energy   · Giving away possessions  · Change in eating patterns- significant weight changes-  positive or negative  · Change in sleeping patterns- unable to sleep or sleeping all the time   · Unwillingness or inability to communicate  · Depression  · Unusual sadness, discouragement and  loneliness  · Talk of wanting to die  · Neglect of personal appearance   · Rebelliousness- reckless behavior  · Withdrawal from people/activities they love  · Confusion- inability to concentrate     If you or a loved one observes any of these behaviors or has concerns about self-harm, here's what you can do:  · Talk about it- your feelings and reasons for harming yourself  · Remove any means that you might use to hurt yourself (examples: pills, rope, extension cords, firearm)  · Get professional help from the community (Mental Health, Substance Abuse, psychological counseling)  · Do not be alone:Call your Safe Contact- someone whom you trust who will be there for you.  · Call your local CRISIS HOTLINE 229-4136 or 956-107-1776  · Call your local Children's Mobile Crisis Response Team [place] (117) 058-7849 or www.[employer]  · Call the toll free National Suicide Prevention Hotlines   · National Suicide Prevention Lifeline 282-500-BFRX (9511)  · [employer] Hope Line Network 800-SUICIDE (074-2750)    INSTRUCTIONS FOR COVID-19 OR ANY OTHER INFECTIOUS RESPIRATORY ILLNESSES    The Centers for Disease Control and Prevention (CDC) states that early indications for COVID-19 include cough, shortness of breath, difficulty breathing, or at least two of the following symptoms: chills, shaking with chills, muscle pain, headache, sore throat, and loss of taste or smell. Symptoms can range from mild to severe and may appear up to two weeks after exposure to the virus.    The practice of self-isolation and quarantine helps protect the public and your family by  preventing exposure to people who have or may have a contagious disease. Please follow the prevention steps below as based on CDC guidelines:    WHEN TO STOP ISOLATION: Persons with COVID-19 or any other infectious respiratory illness who have symptoms and were advised to care for themselves at home may discontinue home isolation under the following  conditions:  · At least 3 days (72 hours) have passed since recovery defined as resolution of fever without the use of fever-reducing medications; AND,  · Improvement in respiratory symptoms (e.g., cough, shortness of breath); AND,  · At least 10 days have passed since symptoms first appeared and have had no subsequent illness.    MONITOR YOUR SYMPTOMS: If your illness is worsening, seek prompt medical attention. If you have a medical emergency and need to call 911, notify the dispatch personnel that you have, or are being evaluated for confirmed or suspected COVID-19 or another infectious respiratory illness. Wear a facemask if possible.    ACTIVITY RESTRICTION: restrict activities outside your home, except for getting medical care. Do not go to work, school, or public areas. Avoid using public transportation, ride-sharing, or taxis.    SCHEDULED MEDICAL APPOINTMENTS: Notify your provider that you have, or are being evaluated for, confirmed or suspected COVID-19 or another infectious respiratory. This will help the healthcare provider’s office safely take care of you and keep other people from getting exposed or infected.    FACEMASKS, when to wear: Anytime you are away from your home or around other people or pets. If you are unable to wear one, maintain a minimum of 6 feet distancing from others.    LIVING ENVIRONMENT: Stay in a separate room from other people and pets. If possible, use a separate bathroom, have someone else care for your pets and avoid sharing household items. Any items used should be washed thoroughly with soap and water. Clean all “high-touch” surfaces every day. Use a household cleaning spray or wipe, according to the label instructions. High touch surfaces include (but are not limited to) counters, tabletops, doorknobs, bathroom fixtures, toilets, phones, keyboards, tablets, and bedside tables.     HAND WASHING: Frequently wash hands with soap and water for at least 20 seconds,  especially  after blowing your nose, coughing, or sneezing; going to the bathroom; before and after interacting with pets; and before and after eating or preparing food. If hands are visibly dirty use soap and water. If soap and water are not available, use an alcohol-based hand  with at least 60% alcohol. Avoid touching your eyes, nose, and mouth with unwashed hands. Cover your coughs and sneezes with a tissue. Throw used tissues in a lined trash can. Immediately wash your hands.    ACTIVE/FACILITATED SELF-MONITORING: Follow instructions provided by your local health department or health professionals, as appropriate. When working with your local health department check their available hours.    [facility]   Phone Number   [facility] [phone]   [facility] [phone]   [place] Call 211   [place] [phone]     IF YOU HAVE CONFIRMED POSITIVE COVID-19:    Those who have completely recovered from COVID-19 may have immune-boosting antibodies in their plasma--called “convalescent plasma”--that could be used to treat critically ill COVID19 patients.    [facility] is excited to begin working with [facility] on collecting convalescent plasma from  people who have recovered from COVID-19 as part of a program to treat patients infected with the virus. This FDA-approved “emergency investigational new drug” is a special blood product containing antibodies that may give patients an extra boost to fight the virus.    To be eligible to donate convalescent plasma, you must have a prior COVID-19 diagnosis documented by a laboratory test (or a positive test result for SARS-CoV-2 antibodies) and meet additional eligibility requirements.    If you are interested in donating convalescent plasma or have any additional questions, please contact the [facility] Convalescent Plasma  at [phone] or via e-mail at [email or web address].

## 2020-05-21 NOTE — DISCHARGE SUMMARY
Discharge Summary    CHIEF COMPLAINT ON ADMISSION  Chief Complaint   Patient presents with   • Wound Check     Pt reports to have left leg wound, being seen at wound care, sent by MD for further care. pt denies fever, but states wounds on leg are draining.        Reason for Admission  Sent by MD; Wound Check     Admission Date  4/16/2020    CODE STATUS  Full Code    HPI & HOSPITAL COURSE  This is a 64 y.o. male here with LLE ulcer.    Pt with past medical history significant for venous stasis dermatitis and stasis ulcers of bilateral lower extremities who presented on 4/16/2020 with left leg redness and swelling.  Patient was previously followed by wound care as an outpatient but was discharged when ulcers had resolved, but ulcers came back 1 week prior to presentation.  Patient has had multiple incision and drainages with a wound VAC in place. Intraoperative wound culture grew Pseudomonas and group A strep. Current plans for skin graft once more healing has occurred.     Interval Problem Update  Was on Zosyn, Meropenem, d/c'd by ID 5/18  Iron deficiency, tx with IV iron and po for d/c  CKD stable  Continues on outpt BP meds with HTN controlled  Wound vac still in place  Pending skin graft at some future date  To f/u with wound care clinic, as part of HH, but for first month will have wound vac rep to home to help HH     Therefore, he is discharged in good and stable condition to home with organized home healthcare and close outpatient follow-up.    The patient met 2-midnight criteria for an inpatient stay at the time of discharge.    Discharge Date  5/21/2020    FOLLOW UP ITEMS POST DISCHARGE  As per HH and wound clinic instructions  No drugs or etoh!    DISCHARGE DIAGNOSES  Principal Problem:    Venous stasis ulcer of left lower extremity (HCC) POA: Yes      Overview: S/p venous ablation through vascular with improvement in wound healing,       using lymphedema pump and compression  Active Problems:    Essential  hypertension POA: Yes    CKD (chronic kidney disease) stage 3, GFR 30-59 ml/min ([facility]) POA: Yes    Venous stasis dermatitis of both lower extremities POA: Yes    Methamphetamine abuse ([facility]) POA: Yes    Normocytic anemia POA: Yes  Resolved Problems:    Hyponatremia POA: Yes    Hyperkalemia POA: No    Constipation POA: Unknown    Anxiety POA: Yes    Alcohol abuse POA: Yes      FOLLOW UP  Future Appointments   Date Time Provider Department Center   5/28/2020  2:40 PM Ruslan Marino M.D. [facility]   6/5/2020  9:30 AM MICHELLE Alfaro [address]     Wound Care Center  [address]  [facility] [address]  [phone]  On 5/1/2020  for LPS rounds in am to evaluate charcot foot with foot and ankle surgeon    Ruslan Marino M.D.  [address]  [facility] [address]  [phone]    Go on 5/28/2020  Please go to your Primary Care doctor appointment at 2:40pm.     wound care      For wound re-check      MEDICATIONS ON DISCHARGE     Medication List      CONTINUE taking these medications      Instructions   acetaminophen 500 MG Tabs  Commonly known as:  TYLENOL   Take 1,000 mg by mouth 1 time daily as needed for Moderate Pain.  Dose:  1,000 mg     amLODIPine 10 MG Tabs  Commonly known as:  NORVASC   Take 1 Tab by mouth every day.  Dose:  10 mg     ascorbic acid 500 MG tablet  Commonly known as:  VITAMIN C   Take 1 Tab by mouth every day.  Dose:  500 mg     atorvastatin 40 MG Tabs  Commonly known as:  Lipitor   Take 1 Tab by mouth every day.  Dose:  40 mg     ferrous gluconate 324 (38 Fe) MG Tabs  Commonly known as:  FERGON   Take 1 Tab by mouth every morning with breakfast.  Dose:  324 mg     sertraline 100 MG Tabs  Commonly known as:  ZOLOFT   Doctor's comments:  To replace celexa  Take 1 Tab by mouth every day.  Dose:  100 mg            Allergies  Allergies   Allergen Reactions   • Norco [Hydrocodone-Acetaminophen] Itching       DIET  Orders Placed This Encounter   Procedures   • Diet  Order Regular     Standing Status:   Standing     Number of Occurrences:   1     Order Specific Question:   Diet:     Answer:   Regular [1]       ACTIVITY  Light duty.  Non-weight bearing LEFT leg    CONSULTATIONS  ID, LPS, Ortho    PROCEDURES  Wound vac in place    LABORATORY  Lab Results   Component Value Date    SODIUM 139 05/19/2020    POTASSIUM 4.3 05/19/2020    CHLORIDE 102 05/19/2020    CO2 27 05/19/2020    GLUCOSE 103 (H) 05/19/2020    BUN 14 05/19/2020    CREATININE 0.83 05/19/2020        Lab Results   Component Value Date    WBC 8.7 05/21/2020    HEMOGLOBIN 7.3 (L) 05/21/2020    HEMATOCRIT 23.2 (L) 05/21/2020    PLATELETCT 231 05/21/2020

## 2020-05-21 NOTE — PROGRESS NOTES
LIMB PRESERVATION SERVICE   POST SURGICAL PROGRESS NOTE      HPI:  64 y.o.  with a past medical history that includes hypertension, hepatitis C, kidney disease, polysubstance abuse, nondiabetic Charcot's deformity left foot, and chronic venous stasis ulcers bilateral lower extremities admitted 4/16/2020 for Cellulitis of left lower extremity.        LPS has been consulted for evaluation of left lower leg venous stasis ulcers.  The patient has a history of chronic venous stasis ulcers and has been seen in outpatient wound clinic for treatment as well as infectious disease in October 2019 for an infected skin ulcer.  He reports that his previous venostasis ulcers had resolved approximately 1 month ago and he was discharged from the outpatient wound clinic.  He states that 1 week ago (4/12/2020) he was in the shower and noticed that he developed an ulcer on the anterior aspect of his lower leg distal to his knee and then the ulcer had continued to worsen from there radiating to the back of his left lower leg and development of new ulcers down his left lateral lower leg to his ankle.  He reports having fever, chills, and a large amount of clear drainage from his ulcers.  He states that 4/15/2020 his left lower leg began to swell and developed redness.  He was seen in the outpatient wound center where he was found to have significant swelling and erythema to his left lower extremity, weeping from superficial tissue, and foul-smelling odor.    Patient proceeded to ED.  ESR 97, CRP 31.7.  He was started on vancomycin and Zosyn IV and admitted under hospital services.     SURGERY DATE: 04/22/20  PROCEDURE: with Dr. Israel  1.  Left leg irrigation and debridement, multiple compartments, deep down to   the level of muscle.  2.  Left fasciotomy anterior and posterior compartments.  3.  Left placement of wound VAC.    SURGERY DATE: 04/24/20  PROCEDURE: with Dr. Israel  1.  Left leg irrigation and debridement, multiple  compartments.  2.  Placement of wound VAC.    SURGERY DATE: 04/27/20  PROCEDURE: with Dr. John  1.  Left leg irrigation and debridement, multiple compartments.  2.  Placement of wound VAC.    SURGERY DATE: 05/4/2020 with Dr. Israel and Dr. John  PROCEDURE: Left leg irrigation and debridement.         4/22/2020: Patient denies fevers, chills, nausea, vomiting.  Complains of pain with inspection of lower leg ulcers.  Tract with purulent drainage noted yesterday by wound team.  Repeat CT ordered and was negative for abscess or osteomyelitis.  Patient has been n.p.o. since last night.  Dressings that were changed yesterday are completely saturated today.   Patient also has Charcot foot to left foot and complains of severe pain to foot when walking.  4/28/2020: Patient denies fevers, chills, nausea, vomiting.  Pain well controlled.   LPS was called by bedside RN to assess left LE.  Wound VAC placed last night, clotted off when patient arrived to floor, troubleshoot by night RN, unable to resolve.  NOC RN removed drape (not the foam) and the wound started bleeding.  NOC held pressure to wound bed, and applied pressure dressing.    4/29/2020: Patient's Left LE dressing intact, removed pressure dressing with wound care RN, Oriana Barcenas.  Still open bleeding with light trickle of, used AgNO3 to help stop small bleeds, larger bleeds pressure dressing replaced by wound RNOriana.   5/01/2020: Patient denies fever, chills, n/v.  No complaints of pain.  Dressing removed and wounds assessed.  Wound VAC was placed on Left LE Anterior/Lateral wound.  2 layer compression wrap placed to left LE.  Plan for patient to go to surgery on Monday.   5/5/2020: POD #1 S/P left leg I&D.  Denies any fevers, chills, nausea, vomiting.  Seen with wound team during VAC placement to left lower leg.  5/6/2020: POD #2 S/P left leg I&D.  N.p.o. for possible surgery.  Surgery canceled.  Reschedule for next week.  Wound team to apply veraflo to left  lateral calf ulcer  5/8/2020: POD #4 S/P left leg I&D.  N.p.o. for possible surgery today.  LLE ulcers seen with wound team.  Tendon visible, viable, clean to lateral calf.  Tract proximally has decreased.  Plan for split-thickness skin graft next week.  Continue with veraflo to lateral calf and regular VAC to posterior medial lower leg.  5/11/2020: POD #7 S/P left leg I&D.  Seen with Wound team for veraflo VAC change. Tendon remains visible to lateral calf, tract at 11:00 of 2.5cm.  Postpone STSG until next week.  5/13/2020: POD #9.  Seen during veraflo wound VAC change with wound team.  Tract remains the same at 2.5 cm, tendon visible with minimal granular buds.  Surgery next week with Dr. Israel for possible split-thickness skin graft  5/18/2020: POD#14. Patient seen during wound vac change with wound care team. Tract remains the same at 2.5cm at 11:00, tendon visible with minimal granular buds- minimally changed from previous images on 5/13/2020. Notified orthopedic surgeon- surgery today cancelled.   5/20/2020: POD # 16. Seen with wound team for veraflo VAC change to LLE. Tract remains at 2.5cm. increase granular buds to distal aspect of tendon to lateral calf. Continue with veraflo  5/21/2020: POD # 17. Sutures removed to posterior ankle yesterday. Plans to discharge to home today with HH and outpt wound clinic. Rx for wound care instructions sent with pt and faxed to [facility].        PERTINENT LPS RESULTS:   No new pertinent results    SURGICAL SITE EXAM:      /56   Pulse 62   Temp 36.7 °C (98 °F) (Temporal)   Resp 16   Ht 1.829 m (6')   Wt 82.2 kg (181 lb 3.5 oz)   SpO2 96%   BMI 24.58 kg/m²     Pedal Pulses: palpable pulses  Sensation: LOPS  Foot warm to touch    Left lower leg ulcers:   VAC in place at 125mmhg continuous         INFECTION MANAGEMENT:    Results from last 7 days   Lab Units 05/21/20  0055 05/20/20  0121 05/19/20  0243 05/18/20  0245 05/17/20  0253  05/16/20  1003   WBC 1501 K/uL 8.7 7.9 6.9 6.7 5.9 5.9   PLATELET COUNT 1518 K/uL 231 229 195 168 149* 144*     Wound culture results:   Results     Procedure Component Value Units Date/Time    SARS-CoV-2, PCR (In-House) [[id number]] Collected:  05/17/20 1741    Order Status:  Completed Updated:  05/17/20 1855     SARS-CoV-2 Source NP Swab     SARS-CoV-2 by PCR NotDetected     Comment: [facility] providers: PLEASE REFER TO DE-ESCALATION AND RETESTING PROTOCOL  on [email or web address]  **The [facility] GeneXpert Xpress SARS-CoV-2 Test has been made available for  use under the Emergency Use Authorization (EUA) only.         Narrative:       [id number] LUISA WU    COVID/SARS CoV-2 [[id number]] Collected:  05/17/20 1741    Order Status:  Completed Specimen:  Respirate from Nasopharyngeal Updated:  05/17/20 1753     COVID Order Status Received    Narrative:       [id number] LUISA WU               ASSESSMENT/PLAN:   Patient POD # 17 S/P left lower leg I&D.  Patient has had 4 I&D's to this area thus far.  Patient progressing slowly.  -tract to 11:00 position remains unchanged. Granular tissue to all wound beds. Tendon to left lateral leg exposed with budding over distal aspect of tendon.          Wound care:  - wound team to manage wound care.   -Regular wound VAC to left posterior medial lower leg ulcers   -VERAFLO to left lateral calf and anterior proximal leg,   -2 layer compression wrap to LLE to control edema  -VAC change and 2 layer compression wrap 3x/wk     Vascular status:   - Palpable pulses    Antibiotics:   -meropenem and linezolid discontinued by ID 5/18/2020  ID signed off.     Weight Bearing Status:   -Weight bearing as tolerated    Offloading:   -offload with pillows    PT/OT :   -involved, last seen by PT 5/19/2020      Plan to return to O.R.:   No further plans for surgery while IP. Plan for STSG in OP setting      DISCHARGE PLAN:    Disposition: ok to discharge from LPS/Ortho  standpoint    5/20/2020: Recommend SNF or LTACH for continued veraflo VAC therapy. Pt does not meet skilled need for SNF per SW.    -Since SNF is not an option recommend home with HH 2x/wk and [facility] outpt wound care clinic 1x/wk with provider.  Referral placed for wound care clinic.   Informed by Brianna ISSA that pt was denied by [facility]  and SW recommended I place another referral for wound clinic appt 3x/wk for VAC changes and 2 layer compression wrap. Notified Mine AVINA at wound clinic of denial by HH. SW to contact scheduling department to coordinate wound care clinic appts 3x/wk for VAC changes and 2 layer compression wrap. SW attempting alternative HH.     5/21/2020: [facility] at [facility] wound care clinic.  First appointment at wound care clinic is 6/5/2020.  Patient will follow-up with home health 3 times a week until first wound care appointment at clinic and then 2 times a week and wound care clinic 1 time a week.  Michelle Matute with [facility] to assist home health with VAC changes. Written prescription for wound care orders faxed to home health agency and given to patient.              Follow-up: Dr. Israel once tendon to lateral calf is covered for STSG    Discussed with:  RN, Brianna ISSA, [facility]ute [facility]      Abeba Hernández, A.P.R.N.    If any questions or concerns, please call [phone]

## 2020-05-21 NOTE — DISCHARGE PLANNING
Anticipated Discharge Disposition:   · Home with wound vac and HHC services and outpatient wound clinic    Action:   · Received follow up call from Mihaela with [employer] (wound vac company) [phone]. Reported wound vac would be delivered today between [id number].   · LSW updated BSN and explained once wound vac is delivered and medical intervention related to wound vac is completed pt should be ready to discharge home this evening with daughter. Notified BSN that HHC will establish with pt tomorrow.     Barriers to Discharge:   · Delivery of wound vac    Plan:   · Pt to discharge home this evening once wound vac is delivered.

## 2020-05-21 NOTE — DISCHARGE PLANNING
Anticipated Discharge Disposition:   · Home with wound vac and [facility] services and outpatient wound clinic  Action:   ·  Follow up with Nasrin [phone] with [facility] to determine if agency would be able to accept pt and work with [facility] rep regarding wound vac changes until pt's appt with wound clinic on June 5, 2020. Nasrin confirmed this would not be a problem and reported if pt is discharged today they will meet with pt at home tomorrow at 1000.   · LSW notify PCP and LPS-APRN via TT.   · Currently pending delivery of wound vac. Outreach call to Yasmine and Mihaela with [facility] to follow up on scheduling delivery.   · Outreach call to daughter, Megan to provide update of information. She voiced understanding and is agreeable to plan.     Barriers to Discharge:   · Delivery of Wound Vac    Plan:   · Care coordination will continue to follow up and provide assistance as needed with discharge plans/barriers.

## 2020-05-21 NOTE — PROGRESS NOTES
· 2 RN skin check complete with Diana.   · Devices in place Wound vac LLE..  · Skin assessed under devices unable to, wound nurse assessed.  · Confirmed pressure ulcers found on n/a.  · New potential pressure ulcers noted on n/a. Wound consult placed? Wound already working with pt. Photo uploaded? yes.   · Right lower leg was dry, flakey, and brown/daisy in color.  · Groin was red,itchy, blanching on bilateral sides.  · Bilateral buttox was slightly red, blanching, and flakey.  · The following interventions are in place: Moisturizing body cream put on pt's right leg, and buttox. Bed locked and lowest postion, call light within reach, non skid socks on, clutter free environment, and pt encouraged to ambulate.

## 2020-05-21 NOTE — PROGRESS NOTES
Assumed care at 1900, report received from Suzy ROGERS.  Pt A&O x 4, states pain is 05/10 in left lower leg. Medication provided per MAR. Wound Vac in place, and right SCD on. Bed locked, 2 rails up, bed in lowest position. Call light in place, belongings at bedside, no needs at this time and hourly rounding in place.

## 2020-05-22 ENCOUNTER — PATIENT OUTREACH (OUTPATIENT)
Dept: HEALTH INFORMATION MANAGEMENT | Facility: OTHER | Age: 65
End: 2020-05-22

## 2020-05-22 NOTE — PROGRESS NOTES
DANE Bess returned Megan's () vm regarding pt Goran. She reports he is doing well. No medications needed for . Reminded Megan about upcoming PCP appmnt on 5/28 at [facility] at Dr. Marino. She reports no transportation issues getting to medical appmnt. She reports Goran has HomeHealth. Megan reports no other needs at this time. Informed Megan about CCM services. Advised Megan to reach out to CCM when needed. Goran met all goals, and will be d/c from CCM services.

## 2020-05-26 ENCOUNTER — TELEPHONE (OUTPATIENT)
Dept: HEALTH INFORMATION MANAGEMENT | Facility: OTHER | Age: 65
End: 2020-05-26

## 2020-05-26 NOTE — TELEPHONE ENCOUNTER
"Received VM from patient stating his medications were \"filled for the wrong month\". Outbound call to patient to discuss. Unable to reach patient. [facility] for patient to return call to [phone]. Outbound call to pharmacy. Spoke to pharmacist at [facility] who states patient's daughter came in on 20 with a Percocet Rx for 2020 that was  and unable to be filled. Patient has no prescriptions on file that are able to be filled. Patient will need a new Rx if pain medication is still necessary.  "

## 2020-05-27 ENCOUNTER — TELEPHONE (OUTPATIENT)
Dept: MEDICAL GROUP | Facility: MEDICAL CENTER | Age: 65
End: 2020-05-27

## 2020-05-28 NOTE — TELEPHONE ENCOUNTER
"Phone Number Called: [phone] (home)     Call outcome: Spoke to patient regarding message below.    Message: Received VM from patient stating his medications were \"filled for the wrong month\". Outbound call to patient to discuss.  Spoke with  patient's daughter that Rx for pain medication was  and unable to be filled. Patient has no prescriptions on file that are able to be filled. Patient will need a new Rx if pain medication is still necessary. Pt in alot of pain.      ## OXYCODONE RX ##   Pt was referred to pain management, no opiates ordered by Dr Ch. He can check with whoever has been providing his recent Rxs.  Dr Maciel  "

## 2020-06-02 ENCOUNTER — TELEPHONE (OUTPATIENT)
Dept: MEDICAL GROUP | Facility: MEDICAL CENTER | Age: 65
End: 2020-06-02

## 2020-06-02 NOTE — TELEPHONE ENCOUNTER
Spoke with patient's daughter about no show to appointment yesterday 6/1/20.  Explained this was his first no show and the no show policy.

## 2020-06-04 ENCOUNTER — TELEPHONE (OUTPATIENT)
Dept: MEDICAL GROUP | Facility: MEDICAL CENTER | Age: 65
End: 2020-06-04

## 2020-06-05 ENCOUNTER — OFFICE VISIT (OUTPATIENT)
Dept: WOUND CARE | Facility: MEDICAL CENTER | Age: 65
End: 2020-06-05
Attending: NURSE PRACTITIONER
Payer: MEDICARE

## 2020-06-05 VITALS
RESPIRATION RATE: 20 BRPM | DIASTOLIC BLOOD PRESSURE: 93 MMHG | HEART RATE: 80 BPM | SYSTOLIC BLOOD PRESSURE: 132 MMHG | TEMPERATURE: 98.2 F | OXYGEN SATURATION: 99 %

## 2020-06-05 DIAGNOSIS — L97.922 SKIN ULCER OF LEFT LOWER LEG WITH FAT LAYER EXPOSED (HCC): Primary | ICD-10-CM

## 2020-06-05 DIAGNOSIS — L97.912 SKIN ULCER OF MULTIPLE SITES OF RIGHT LOWER EXTREMITY WITH FAT LAYER EXPOSED (HCC): ICD-10-CM

## 2020-06-05 DIAGNOSIS — I87.8 CHRONIC VENOUS STASIS: ICD-10-CM

## 2020-06-05 DIAGNOSIS — M21.962 ACQUIRED DEFORMITY OF LEFT ANKLE AND FOOT: ICD-10-CM

## 2020-06-05 PROCEDURE — 99214 OFFICE O/P EST MOD 30 MIN: CPT | Mod: 25

## 2020-06-05 PROCEDURE — 11045 DBRDMT SUBQ TISS EACH ADDL: CPT

## 2020-06-05 PROCEDURE — 11042 DBRDMT SUBQ TIS 1ST 20SQCM/<: CPT | Performed by: NURSE PRACTITIONER

## 2020-06-05 PROCEDURE — 11042 DBRDMT SUBQ TIS 1ST 20SQCM/<: CPT

## 2020-06-05 PROCEDURE — 11045 DBRDMT SUBQ TISS EACH ADDL: CPT | Performed by: NURSE PRACTITIONER

## 2020-06-05 RX ORDER — HYDROXYZINE PAMOATE 50 MG/1
CAPSULE ORAL
COMMUNITY
Start: 2020-04-22 | End: 2021-04-11

## 2020-06-05 RX ORDER — CEPHALEXIN 500 MG/1
CAPSULE ORAL
COMMUNITY
Start: 2020-04-22 | End: 2020-08-04

## 2020-06-05 RX ORDER — GABAPENTIN 300 MG/1
CAPSULE ORAL
COMMUNITY
Start: 2020-04-22 | End: 2020-09-03

## 2020-06-05 ASSESSMENT — ENCOUNTER SYMPTOMS
DEPRESSION: 1
CHILLS: 0
NERVOUS/ANXIOUS: 0
NAUSEA: 0
FEVER: 0
DIARRHEA: 0
MYALGIAS: 0
COUGH: 0
VOMITING: 0
CONSTIPATION: 0
SHORTNESS OF BREATH: 0

## 2020-06-05 ASSESSMENT — PAIN SCALES - GENERAL: PAINLEVEL: 7=MODERATE-SEVERE PAIN

## 2020-06-05 NOTE — PROGRESS NOTES
Provider Encounter- Full Thickness wound    HISTORY OF PRESENT ILLNESS  Wound History:    START OF CARE IN CLINIC: 5/20/2020               REFERRING PROVIDER: KEV Keith                 WOUND ETIOLOGY: venous,  likely mixed etiology              LOCATION: Left lower leg, multiple wounds                                   Right lower leg, multiple wounds-first observed in clinic on initial evaluation, 6/5/2020                HISTORY: Patient is very well-known to this clinic from previous treatment of wounds to his left lower extremity.  He was discharged from the clinic in March 2020 due to full resolution of his wound.  He returned to the clinic approximately 3 weeks later, on 4/16, with cellulitis, edema, and large open wounds to his left lower leg, and was sent directly to [facility] emergency room.  He was admitted for IV antibiotics and surgical intervention.  On 4/22/2020, he underwent an I&D of his left lower extremity, fasciotomy, and VAC placement.  In the following weeks he underwent surgery 3 more times for irrigation and debridement.  He was discharged home on 5/21, on the VAC, with home health and a referral to [facility].       Pertinent Medical History:  Anxiety, Charcot's joint of left foot, non-diabetic (3/21/2016), Chronic congestive heart failure (HCC) (11/16/2017), Hepatitis C, chronic (HCC), Hypertension, Migraine, Polysubstance abuse (HCC) (3/8/2018), Tobacco use (4/18/2016), Ulcer of left lower extremity with necrosis of muscle (HCC) (3/21/2016), and Venous stasis ulcer ([facility]) (2017).                ETIOLOGY HISTORY:  Vascular Surgeon: Dr. White. Compression Circ-aid. Varicose Veins none visible     TOBACCO USE: Patient denies; patient also denies alcohol and recreational drug use    Patient's problem list, allergies, and current medications reviewed and updated in Epic    Interval History:  6/5/2020: Initial clinic visit with KEV Brantley.  Patient returns to clinic after a  "long hospitalization.  The VAC was held by home health due to severe maceration and deterioration of wound beds.  He also presents today with new wounds to his right lower extremity.  He states he does not know how or when these occurred, states, \"they just popped up\".  He is uncertain whether or not he has follow-up appointments with his surgeon.  He does not know what medications he is on.      REVIEW OF SYSTEMS:   Review of Systems   Constitutional: Negative for chills and fever.        States he is eating well, appetite normal   Respiratory: Negative for cough and shortness of breath.    Cardiovascular: Positive for leg swelling. Negative for chest pain.        Chronic swelling in legs, for several years   Gastrointestinal: Negative for constipation, diarrhea, nausea and vomiting.   Genitourinary: Negative for dysuria.   Musculoskeletal: Positive for joint pain. Negative for myalgias.        Chronic   Skin:        New wounds to right lower leg, patient does not know how or when they started  Hospitalized in April and May 2020 for left lower leg wound, underwent several surgeries   Psychiatric/Behavioral: Positive for depression. The patient is not nervous/anxious.         States he is a little bit depressed about his legs, denies wanting to harm himself       PHYSICAL EXAMINATION:   /93 (BP Location: Left arm, Patient Position: Sitting, BP Cuff Size: Adult)   Pulse 80   Temp 36.8 °C (98.2 °F) (Temporal)   Resp 20   SpO2 99%     Physical Exam   Constitutional: He is oriented to person, place, and time and well-developed, well-nourished, and in no distress.   Disheveled unkept   HENT:   Head: Normocephalic and atraumatic.   Eyes: Pupils are equal, round, and reactive to light. Conjunctivae and EOM are normal.   Neck: Neck supple.   Cardiovascular: Intact distal pulses.   Pulmonary/Chest: Effort normal.   Musculoskeletal: Normal range of motion.         General: Deformity and edema present.      Comments: " Left charcot foot and ankle  3+pitting edema to bilateral LE   Neurological: He is alert and oriented to person, place, and time.   Skin: Skin is warm. There is erythema.   Large full-thickness wounds to bilateral lower extremities  Further wound flowsheet and photos   Psychiatric: Affect normal.   Patient lacks insight into his own healthcare issues, defers to his daughter to keep track of his medications and appointments       WOUND ASSESSMENT    Wound 06/05/20 Full Thickness Wound Leg Left LLE anteriolateral (Active)   Wound Image     06/05/20 0930   Site Assessment Red;Yellow;Pink 06/05/20 0930   Periwound Assessment Denuded;Pink;Edema 06/05/20 0930   Margins Attached edges 06/05/20 0930   Closure Secondary intention 06/05/20 0930   Drainage Amount Large 06/05/20 0930   Drainage Description Serosanguineous 06/05/20 0930   Treatments Cleansed;Provider debridement;Topical Lidocaine 06/05/20 0930   Wound Cleansing Puracyn [place] 06/05/20 0930   Periwound Protectant Barrier Paste;Skin Moisturizer 06/05/20 0930   Dressing Cleansing/Solutions Normal Saline 06/05/20 0930   Dressing Options Compression Wrap Two Layer;Hydrofiber Silver;Nonadhesive Foam;Petroleum Gauze (clear) 06/05/20 0930   Dressing Changed Changed 06/05/20 0930   Dressing Status Clean;Dry;Intact 06/05/20 0930   Dressing Change/Treatment Frequency Every 48 hrs, and As Needed 06/05/20 0930   Non-staged Wound Description Full thickness 06/05/20 0930   Wound Length (cm) 18.2 cm 06/05/20 0930   Wound Width (cm) 15 cm 06/05/20 0930   Wound Depth (cm) 0.2 cm 06/05/20 0930   Wound Surface Area (cm^2) 273 cm^2 06/05/20 0930   Wound Volume (cm^3) 54.6 cm^3 06/05/20 0930   Post-Procedure Length (cm) 21.2 cm 06/05/20 0930   Post-Procedure Width (cm) 15 cm 06/05/20 0930   Post-Procedure Depth (cm) 0.3 cm 06/05/20 0930   Post-Procedure Surface Area (cm^2) 318 cm^2 06/05/20 0930   Post-Procedure Volume (cm^3) 95.4 cm^3 06/05/20 0930   Wound Bed Granulation (%) 50 %  06/05/20 0930   Wound Bed Slough (%) 20 % 06/05/20 0930   Wound Bed Granulation (%) - Post-Procedure 50 % 06/05/20 0930   Wound Bed Slough % - (Post-Procedure) 5 % 06/05/20 0930   Tunneling (cm) 0 cm 06/05/20 0930   Undermining (cm) 0 cm 06/05/20 0930   Wound Odor Mild 06/05/20 0930   Pulses Left;2+;DP 06/05/20 0930   Exposed Structures Tendon 06/05/20 0930       Wound 06/05/20 Full Thickness Wound Ankle LLE distal posteriomedial (Active)   Wound Image    06/05/20 0930   Site Assessment Yellow;Red 06/05/20 0930   Periwound Assessment Red;Edema 06/05/20 0930   Margins Attached edges 06/05/20 0930   Closure Secondary intention 06/05/20 0930   Drainage Amount Moderate 06/05/20 0930   Drainage Description Serosanguineous 06/05/20 0930   Treatments Cleansed;Provider debridement;Topical Lidocaine 06/05/20 0930   Wound Cleansing Puracyn [place] 06/05/20 0930   Periwound Protectant Skin Moisturizer;Barrier Paste 06/05/20 0930   Dressing Cleansing/Solutions Normal Saline 06/05/20 0930   Dressing Options Compression Wrap Two Layer;Hydrofiber Silver;Nonadhesive Foam 06/05/20 0930   Dressing Changed Changed 06/05/20 0930   Dressing Status Clean;Dry;Intact 06/05/20 0930   Dressing Change/Treatment Frequency Every 48 hrs, and As Needed 06/05/20 0930   Non-staged Wound Description Full thickness 06/05/20 0930   Wound Length (cm) 4.5 cm 06/05/20 0930   Wound Width (cm) 6.5 cm 06/05/20 0930   Wound Depth (cm) 0.2 cm 06/05/20 0930   Wound Surface Area (cm^2) 29.25 cm^2 06/05/20 0930   Wound Volume (cm^3) 5.85 cm^3 06/05/20 0930   Post-Procedure Length (cm) 4.5 cm 06/05/20 0930   Post-Procedure Width (cm) 6.6 cm 06/05/20 0930   Post-Procedure Depth (cm) 0.3 cm 06/05/20 0930   Post-Procedure Surface Area (cm^2) 29.7 cm^2 06/05/20 0930   Post-Procedure Volume (cm^3) 8.91 cm^3 06/05/20 0930   Wound Bed Slough (%) 85 % 06/05/20 0930   Wound Bed Slough % - (Post-Procedure) 60 % 06/05/20 0930   Tunneling (cm) 0 cm 06/05/20 0930   Undermining  (cm) 0 cm 06/05/20 0930   Wound Odor Mild 06/05/20 0930   Pulses Left;2+;DP 06/05/20 0930   Exposed Structures None 06/05/20 0930       Wound 06/05/20 Full Thickness Wound Leg Right RLE posterior (Active)   Wound Image    06/05/20 0930   Site Assessment Pink;Slough;Yellow 06/05/20 0930   Periwound Assessment Other (Comment) 06/05/20 0930   Margins Attached edges 06/05/20 0930   Closure Secondary intention 06/05/20 0930   Drainage Amount Small 06/05/20 0930   Drainage Description Serosanguineous 06/05/20 0930   Treatments Cleansed;Provider debridement;Topical Lidocaine 06/05/20 0930   Wound Cleansing Puracyn [place] 06/05/20 0930   Periwound Protectant Barrier Paste;Skin Moisturizer 06/05/20 0930   Dressing Cleansing/Solutions Normal Saline 06/05/20 0930   Dressing Options Compression Wrap Two Layer;Hydrofiber Silver;Nonadhesive Foam 06/05/20 0930   Dressing Changed Changed 06/05/20 0930   Dressing Status Clean;Intact;Dry 06/05/20 0930   Dressing Change/Treatment Frequency Every 48 hrs, and As Needed 06/05/20 0930   Non-staged Wound Description Full thickness 06/05/20 0930   Wound Length (cm) 2 cm 06/05/20 0930   Wound Width (cm) 2 cm 06/05/20 0930   Wound Depth (cm) 0.1 cm 06/05/20 0930   Wound Surface Area (cm^2) 4 cm^2 06/05/20 0930   Wound Volume (cm^3) 0.4 cm^3 06/05/20 0930   Post-Procedure Length (cm) 2.2 cm 06/05/20 0930   Post-Procedure Width (cm) 2 cm 06/05/20 0930   Post-Procedure Depth (cm) 0.1 cm 06/05/20 0930   Post-Procedure Surface Area (cm^2) 4.4 cm^2 06/05/20 0930   Post-Procedure Volume (cm^3) 0.44 cm^3 06/05/20 0930   Wound Bed Slough % - (Post-Procedure) 30 % 06/05/20 0930   Tunneling (cm) 0 cm 06/05/20 0930   Undermining (cm) 0 cm 06/05/20 0930   Wound Odor None 06/05/20 0930   Pulses 2+;Right;DP 06/05/20 0930   Exposed Structures None 06/05/20 0930       Wound 06/05/20 Full Thickness Wound Leg Left LLE posteriomedial (Active)   Wound Image     06/05/20 0930   Site Assessment Yellow;Red 06/05/20  0930   Periwound Assessment Red;Edema 06/05/20 0930   Margins Attached edges 06/05/20 0930   Closure Secondary intention 06/05/20 0930   Drainage Amount Large 06/05/20 0930   Drainage Description Serous 06/05/20 0930   Treatments Cleansed;Provider debridement;Topical Lidocaine 06/05/20 0930   Wound Cleansing Puracyn [place] 06/05/20 0930   Periwound Protectant Barrier Paste;Skin Moisturizer 06/05/20 0930   Dressing Cleansing/Solutions Normal Saline 06/05/20 0930   Dressing Options Compression Wrap Two Layer;Hydrofiber Silver;Nonadhesive Foam 06/05/20 0930   Dressing Changed Changed 06/05/20 0930   Dressing Status Clean;Dry;Intact 06/05/20 0930   Dressing Change/Treatment Frequency Every 48 hrs, and As Needed 06/05/20 0930   Non-staged Wound Description Full thickness 06/05/20 0930   Wound Length (cm) 6 cm 06/05/20 0930   Wound Width (cm) 12.6 cm 06/05/20 0930   Wound Depth (cm) 0.1 cm 06/05/20 0930   Wound Surface Area (cm^2) 75.6 cm^2 06/05/20 0930   Wound Volume (cm^3) 7.56 cm^3 06/05/20 0930   Post-Procedure Length (cm) 9.8 cm 06/05/20 0930   Post-Procedure Width (cm) 12.6 cm 06/05/20 0930   Post-Procedure Depth (cm) 0.2 cm 06/05/20 0930   Post-Procedure Surface Area (cm^2) 123.48 cm^2 06/05/20 0930   Post-Procedure Volume (cm^3) 24.7 cm^3 06/05/20 0930   Wound Bed Slough (%) 70 % 06/05/20 0930   Wound Bed Slough % - (Post-Procedure) 50 % 06/05/20 0930   Tunneling (cm) 0 cm 06/05/20 0930   Undermining (cm) 0 cm 06/05/20 0930   Wound Odor None 06/05/20 0930   Pulses 2+;Left;DP 06/05/20 0930   Exposed Structures None 06/05/20 0930       Wound 06/05/20 Full Thickness Wound Leg Right RLE lateral (Active)   Wound Image    06/05/20 0930   Site Assessment Brown;Yellow 06/05/20 0930   Periwound Assessment Dry 06/05/20 0930   Margins Attached edges 06/05/20 0930   Closure Secondary intention 06/05/20 0930   Drainage Amount Small 06/05/20 0930   Drainage Description Serous 06/05/20 0930   Treatments Cleansed;Provider  debridement;Topical Lidocaine 06/05/20 0930   Wound Cleansing Puracyn [place] 06/05/20 0930   Periwound Protectant Barrier Paste;Skin Moisturizer 06/05/20 0930   Dressing Cleansing/Solutions Normal Saline 06/05/20 0930   Dressing Options Compression Wrap Two Layer;Hydrofiber Silver;Nonadhesive Foam 06/05/20 0930   Dressing Changed Changed 06/05/20 0930   Dressing Status Clean;Dry;Intact 06/05/20 0930   Dressing Change/Treatment Frequency Every 48 hrs, and As Needed 06/05/20 0930   Non-staged Wound Description Full thickness 06/05/20 0930   Wound Length (cm) 11 cm 06/05/20 0930   Wound Width (cm) 4.5 cm 06/05/20 0930   Wound Depth (cm) 0.1 cm 06/05/20 0930   Wound Surface Area (cm^2) 49.5 cm^2 06/05/20 0930   Wound Volume (cm^3) 4.95 cm^3 06/05/20 0930   Post-Procedure Length (cm) 11.2 cm 06/05/20 0930   Post-Procedure Width (cm) 3.5 cm 06/05/20 0930   Post-Procedure Depth (cm) 0.1 cm 06/05/20 0930   Post-Procedure Surface Area (cm^2) 39.2 cm^2 06/05/20 0930   Post-Procedure Volume (cm^3) 3.92 cm^3 06/05/20 0930   Wound Bed Slough (%) 100 % 06/05/20 0930   Wound Bed Slough % - (Post-Procedure) 80 % 06/05/20 0930   Tunneling (cm) 0 cm 06/05/20 0930   Undermining (cm) 0 cm 06/05/20 0930   Wound Odor None 06/05/20 0930   Pulses 2+;DP;Right 06/05/20 0930   Exposed Structures None 06/05/20 0930       Wound 06/05/20 Full Thickness Wound Ankle Right lateral ankle (Active)   Wound Image    06/05/20 0930   Site Assessment Pink;Red;Yellow 06/05/20 0930   Periwound Assessment Maceration;Satellite lesions;Denuded 06/05/20 0930   Margins Attached edges 06/05/20 0930   Closure Secondary intention 06/05/20 0930   Drainage Amount Moderate 06/05/20 0930   Drainage Description Serous 06/05/20 0930   Treatments Cleansed;Provider debridement;Topical Lidocaine 06/05/20 0930   Wound Cleansing Puracyn [place] 06/05/20 0930   Periwound Protectant Skin Moisturizer;Barrier Paste 06/05/20 0930   Dressing Cleansing/Solutions Normal Saline 06/05/20  0930   Dressing Options Compression Wrap Two Layer;Hydrofiber Silver;Nonadhesive Foam 06/05/20 0930   Dressing Changed Changed 06/05/20 0930   Dressing Status Clean;Dry;Intact 06/05/20 0930   Dressing Change/Treatment Frequency Every 48 hrs, and As Needed 06/05/20 0930   Non-staged Wound Description Full thickness 06/05/20 0930   Wound Length (cm) 1 cm 06/05/20 0930   Wound Width (cm) 3 cm 06/05/20 0930   Wound Depth (cm) 0.1 cm 06/05/20 0930   Wound Surface Area (cm^2) 3 cm^2 06/05/20 0930   Wound Volume (cm^3) 0.3 cm^3 06/05/20 0930   Post-Procedure Length (cm) 4 cm 06/05/20 0930   Post-Procedure Width (cm) 5 cm 06/05/20 0930   Post-Procedure Depth (cm) 0.1 cm 06/05/20 0930   Post-Procedure Surface Area (cm^2) 20 cm^2 06/05/20 0930   Post-Procedure Volume (cm^3) 2 cm^3 06/05/20 0930   Wound Bed Slough (%) 30 % 06/05/20 0930   Wound Bed Slough % - (Post-Procedure) 0 % 06/05/20 0930   Tunneling (cm) 0 cm 06/05/20 0930   Undermining (cm) 0 cm 06/05/20 0930   Wound Odor None 06/05/20 0930   Pulses Bounding;Right;DP 06/05/20 0930   Exposed Structures None 06/05/20 0930       Wound 06/05/20 Full Thickness Wound Leg Right RLE medial distal (Active)   Wound Image    06/05/20 0930   Site Assessment Pink;Yellow 06/05/20 0930   Periwound Assessment Hemosiderin Staining;Scar tissue;Edema 06/05/20 0930   Margins Undefined edges 06/05/20 0930   Closure Secondary intention 06/05/20 0930   Drainage Amount Small 06/05/20 0930   Drainage Description Serous 06/05/20 0930   Treatments Cleansed;Provider debridement;Topical Lidocaine 06/05/20 0930   Wound Cleansing Puracyn [place] 06/05/20 0930   Periwound Protectant Skin Moisturizer;Barrier Paste 06/05/20 0930   Dressing Cleansing/Solutions Normal Saline 06/05/20 0930   Dressing Options Compression Wrap Two Layer;Hydrofiber Silver;Nonadhesive Foam 06/05/20 0930   Dressing Changed Changed 06/05/20 0930   Dressing Status Clean;Intact;Dry 06/05/20 0930   Dressing Change/Treatment Frequency  Every 48 hrs, and As Needed 06/05/20 0930   Non-staged Wound Description Full thickness 06/05/20 0930   Wound Length (cm) 2.4 cm 06/05/20 0930   Wound Width (cm) 1.6 cm 06/05/20 0930   Wound Depth (cm) 0.1 cm 06/05/20 0930   Wound Surface Area (cm^2) 3.84 cm^2 06/05/20 0930   Wound Volume (cm^3) 0.38 cm^3 06/05/20 0930   Post-Procedure Length (cm) 4 cm 06/05/20 0930   Post-Procedure Width (cm) 1.5 cm 06/05/20 0930   Post-Procedure Depth (cm) 0.1 cm 06/05/20 0930   Post-Procedure Surface Area (cm^2) 6 cm^2 06/05/20 0930   Post-Procedure Volume (cm^3) 0.6 cm^3 06/05/20 0930   Wound Bed Slough (%) 30 % 06/05/20 0930   Tunneling (cm) 0 cm 06/05/20 0930   Undermining (cm) 0 cm 06/05/20 0930   Wound Odor None 06/05/20 0930   Pulses Right;2+;DP 06/05/20 0930   Exposed Structures None 06/05/20 0930       Wound 06/05/20 Full Thickness Wound Leg Right RLE anteriomedial (Active)   Wound Image    06/05/20 0930   Site Assessment Yellow;Pink 06/05/20 0930   Periwound Assessment Hemosiderin Staining;Dry;Scar tissue 06/05/20 0930   Margins Attached edges 06/05/20 0930   Closure Secondary intention 06/05/20 0930   Drainage Amount Small 06/05/20 0930   Drainage Description Serous 06/05/20 0930   Treatments Cleansed;Provider debridement;Topical Lidocaine 06/05/20 0930   Wound Cleansing Puracyn [place] 06/05/20 0930   Periwound Protectant Skin Moisturizer;Barrier Paste 06/05/20 0930   Dressing Cleansing/Solutions Normal Saline 06/05/20 0930   Dressing Options Compression Wrap Two Layer;Hydrofiber Silver;Nonadhesive Foam 06/05/20 0930   Dressing Changed Changed 06/05/20 0930   Dressing Status Clean;Dry;Intact 06/05/20 0930   Dressing Change/Treatment Frequency Every 48 hrs, and As Needed 06/05/20 0930   Non-staged Wound Description Full thickness 06/05/20 0930   Wound Length (cm) 4.5 cm 06/05/20 0930   Wound Width (cm) 2 cm 06/05/20 0930   Wound Depth (cm) 0.1 cm 06/05/20 0930   Wound Surface Area (cm^2) 9 cm^2 06/05/20 0930   Wound  Volume (cm^3) 0.9 cm^3 06/05/20 0930   Post-Procedure Length (cm) 3.5 cm 06/05/20 0930   Post-Procedure Width (cm) 2.5 cm 06/05/20 0930   Post-Procedure Depth (cm) 0.1 cm 06/05/20 0930   Post-Procedure Surface Area (cm^2) 8.75 cm^2 06/05/20 0930   Post-Procedure Volume (cm^3) 0.88 cm^3 06/05/20 0930   Wound Bed Slough (%) 50 % 06/05/20 0930   Tunneling (cm) 0 cm 06/05/20 0930   Undermining (cm) 0 cm 06/05/20 0930   Wound Odor None 06/05/20 0930   Pulses Right;2+;DP 06/05/20 0930   Exposed Structures None 06/05/20 0930        PROCEDURE: Excisional debridement of left lower extremity wounds x 3  -2% viscous lidocaine applied topically to wound beds for approximately 10 minutes prior to debridement  -Curette used to debride wound beds.  Excisional debridement was performed to remove devitalized tissue until healthy, bleeding tissue was visualized.  Unable to establish 100% clean wound beds due to patient discomfort.  Total area debrided approximately 400 cm².  Tissue debrided into the subcutaneous layer.    -Bleeding controlled with manual pressure.    -Wound care completed by wound RN, refer to flowsheet  -Patient tolerated the procedure well, without c/o pain or discomfort.     PROCEDURE: Excisional debridement of right lower extremity wounds x 4 (though several small wounds within cluster)  -2% viscous lidocaine applied topically to wound beds for approximately 10 minutes prior to debridement  -Curette used to debride wound beds.  Excisional debridement was performed to remove devitalized tissue until healthy, bleeding tissue was visualized.  Unable to establish 100% clean wound beds due to patient discomfort.  Total area debrided approximately 50 cm².  Tissue debrided into the subcutaneous layer.    -Bleeding controlled with manual pressure.    -Wound care completed by wound RN, refer to flowsheet  -Patient tolerated the procedure well, without c/o pain or discomfort.      Pertinent Labs and Diagnostics:    Labs:      A1c:   Lab Results   Component Value Date/Time    HBA1C 5.7 (H) 01/23/2020 11:22 AM          VASCULAR STUDIES: BHUPINDER 12/27/2019   Right.    Doppler waveform of the common femoral artery is of high amplitude and    triphasic.    Doppler waveforms at the ankle are brisk and triphasic.    Ankle-brachial index is normal.       Left.    Could not perform ankle-brachial index due to large blisters/ulcers.   Normal toe-brachial index: 0.94   Doppler waveform of the common femoral artery is of high amplitude and    triphasic.    Biphasic waveforms seen at the ankle.     WOUND CULTURE:    DATE: 4/22/2020 OR cultures  Culture Result  Abnormal     Pseudomonas aeruginosa   Light growth   P.aeruginosa may develop resistance during prolonged therapy   with all antibiotics. Isolates that are initially susceptible   may become resistant within three to four days after   initiation of therapy. Testing of repeat isolates may be   warranted.     Culture Result  Abnormal     Beta Hemolytic Streptococcus group A   Rare growth          ASSESSMENT AND PLAN:     1. Skin ulcer of left lower leg with fat layer exposed (HCC)  Comment: Patient has history of recurring ulcerations to left lower extremity.  Has undergone ablation procedures by Dr. White.  Discharged from [facility] in March 2020 due to healing, returned approximately 3 weeks later much worse.  Hospitalized from 4/16 until 5/21/2020 underwent multiple surgical debridements.    6/5/2020: Initial clinic visit since hospitalization.  VAC was held by home health and due to severe maceration and deterioration of wound beds.  Wound beds are covered with thick layer of slough. -Excisional debridement of wound in clinic today, medically necessary to promote wound healing.  Unable to establish 100% clean wound beds due to patient discomfort.  -Patient to return to clinic weekly for assessment and debridement  -Home health to change dressing 1-2 times per week in between clinic  visits  -Resume VAC when wound beds are adequately prepared, and periwound intact.    Wound care: Adaptic over tendon, Silver Hydrofiber to manage exudate and bioburden, foam cover dressing, 2 layer compression wrap to manage edema    2. Skin ulcer of multiple sites of right lower extremity with fat layer exposed (HCC)  Comments: First observed in clinic on 6/5/2020, on initial evaluation. Patient is not certain when or how they occurred.  There is no documentation of wounds to this leg during recent hospitalization, so presumably they occurred after he went home.    6/5/2020: Initial clinic visit since hospitalization.   Wound beds are covered with thick layer of slough.  Intact blister to calf, left intact.  -Excisional debridement of wound in clinic today, medically necessary to promote wound healing.  Unable to establish 100% clean wound beds due to patient discomfort.  -Patient to return to clinic weekly for assessment and debridement  -Home health to change dressing 1-2 times per week in between clinic visits      Wound care: Silver Hydrofiber to manage exudate and bioburden, foam cover dressing, 2 layer compression wrap to manage edema        3. Chronic venous stasis  Comments: Hemosiderin staining, brawny edema, scarring- findings consistent with CVI.  Pt established at [facility], underwent ablation procedures in 2019.     -Consider referral back to vascular if wounds failed to progress or deteriorate.    4. Acquired deformity of left ankle and foot  Comments: Charcot deformity of foot and ankle.  Very little ROM of ankle.  Patient was referred to orthopedics previously, however did not make appointment.    -Patient will need to follow-up with orthopedics once wounds are healed.            PATIENT EDUCATION  - Pt advised to go to ER for any increased redness, swelling, drainage or odor, or if he develops fever, chills, nausea or vomiting.        Patient was seen for 30 minutes face to face of which > 50%  of appointment time was spent on counseling and coordination of care regarding the above.    Please note that this note may have been created using voice recognition software. I have worked with technical experts from [employer] [facility] to optimize the interface.  I have made every reasonable attempt to correct obvious errors, but there may be errors of grammar and possibly content that I did not discover before finalizing the note.    N

## 2020-06-05 NOTE — TELEPHONE ENCOUNTER
1. Caller Name: [facility]                         Call Back Number: [phone]      How would the patient prefer to be contacted with a response: Phone call OK to leave a detailed message    Pt was admitted to home health on 5/22/20. the records shows that they are needing signed home health plan of care. will forward to provider available paper work in office to be signed.

## 2020-06-05 NOTE — PATIENT INSTRUCTIONS
Avoid prolonged standing or sitting without elevating your legs.  - Multilayer compression wrap to both legs. Do not get wet and keep on for the week or until home health nurse changes it. Only remove if temperature or sensation changes.   If compression needs to be removed, un-wrap it do not cut it off.     Should you experience any significant changes in your wound(s), such as infection (redness, swelling, localized heat, increased pain, fever > 101 F, chills) or have any questions regarding your home care instructions, please contact the wound center at [phone]. If after hours, contact your primary care physician or go to the hospital emergency room.   Keep dressing clean, dry and covered while bathing. Only change dressing if it becomes over saturated, soiled or falls off.

## 2020-06-05 NOTE — LETTER
2020        Goran Chavez;  1955      [facility] Wound Care Orders, (fax: [phone]):    1) Remove all dressings to bilateral legs.  2) Wash legs and feet; cleanse wounds well with puracyn spray wound cleanser (if unable to acquire, use normal saline).  3) Apply zinc barrier cream around wounds and apply lotion to bilateral legs.   4) Apply hydrofiber silver dressing to wound beds (cover tendon and intact blister with adaptic).  5) Apply non-adhesive foams and wrap with a two layer compression wrap bilaterally.   To be changed 2x/week and PRN by home health; patient to follow up with [facility] 1x/week on  for assessment and debridement.      KEV Mead

## 2020-06-08 ENCOUNTER — OFFICE VISIT (OUTPATIENT)
Dept: MEDICAL GROUP | Facility: MEDICAL CENTER | Age: 65
End: 2020-06-08
Attending: FAMILY MEDICINE
Payer: MEDICARE

## 2020-06-08 VITALS
TEMPERATURE: 98.1 F | DIASTOLIC BLOOD PRESSURE: 76 MMHG | BODY MASS INDEX: 21.81 KG/M2 | HEIGHT: 72 IN | WEIGHT: 161 LBS | HEART RATE: 64 BPM | OXYGEN SATURATION: 100 % | RESPIRATION RATE: 16 BRPM | SYSTOLIC BLOOD PRESSURE: 122 MMHG

## 2020-06-08 DIAGNOSIS — D63.8 ANEMIA OF CHRONIC DISEASE: ICD-10-CM

## 2020-06-08 DIAGNOSIS — I87.2 VENOUS STASIS DERMATITIS OF BOTH LOWER EXTREMITIES: ICD-10-CM

## 2020-06-08 PROCEDURE — 99213 OFFICE O/P EST LOW 20 MIN: CPT | Performed by: FAMILY MEDICINE

## 2020-06-08 RX ORDER — ACETAMINOPHEN 500 MG
1000 TABLET ORAL 3 TIMES DAILY PRN
Qty: 180 TAB | Refills: 2 | Status: SHIPPED | OUTPATIENT
Start: 2020-06-08 | End: 2020-09-03

## 2020-06-08 RX ORDER — OXYCODONE HYDROCHLORIDE 5 MG/1
5 TABLET ORAL 2 TIMES DAILY PRN
Qty: 60 TAB | Refills: 0 | Status: SHIPPED | OUTPATIENT
Start: 2020-06-08 | End: 2020-07-06 | Stop reason: SDUPTHER

## 2020-06-08 ASSESSMENT — FIBROSIS 4 INDEX: FIB4 SCORE: 1.48

## 2020-06-08 NOTE — PROGRESS NOTES
"Subjective:      Goran Chavez is a 65 y.o. male who presents with Leg Pain (bilateral leg pain, leg wounds)            HPI 1.  Bilateral venous stasis ulcers-patient was recently admitted from 4/16 through 5/21/2020 at [facility] due to severe extensive venous stasis ulcers predominantly on the left leg.  There is a large area of the anterior and lateral left calf that has been denuded based on wound care photos from 6/5/2020.  Patient is wearing occlusive dressings that are changed every 2 days by either home health or wound care which he will see once per week.  Patient has significant pain associated with both legs left worse than right.  He was given a prescription for oxycodone 5 mg however the prescription was not prepared correctly and it could not be filled.  Patient is currently taking some dose of Tylenol several times per day with minimal benefit.  2.  Anemia of chronic disease-most recent laboratory measurement of hemoglobin showed mild increase up to 8.1 patient is taking oral iron and received 2 iron transfusions while hospitalized.  Patient is not reporting any gross bleeding  ROS negative for fever, recent trauma, dysuria       Objective:     /76 (BP Location: Left arm, Patient Position: Sitting, BP Cuff Size: Adult)   Pulse 64   Temp 36.7 °C (98.1 °F) (Temporal)   Resp 16   Ht 1.829 m (6' 0.01\")   Wt 73 kg (161 lb)   SpO2 100%   BMI 21.83 kg/m²      Physical Exam  Gen.- alert, cooperative, in no acute distress  Neck- midline trachea, thyroid not enlarged or tender,supple, no cervical adenopathy  Chest-clear to auscultation and percussion with normal breath sounds. No retractions. Chest wall nontender  Cardiac- regular rhythm and rate. No murmur, thrill, or heave  Lower extremities-both lower extremities are wrapped in occlusive white almost plaster dressings with overlying Coban bandage.  No drainage is seen at the ends of the dressing.  These are on both legs.  Recent wound clinic " photos reviewed from 6/5/2020          Assessment/Plan:       1. Venous stasis dermatitis of both lower extremities    - oxyCODONE immediate-release (ROXICODONE) 5 MG Tab; Take 1 Tab by mouth 2 times a day as needed for Severe Pain for up to 30 days.  Dispense: 60 Tab; Refill: 0  - acetaminophen (TYLENOL) 500 MG Tab; Take 2 Tabs by mouth 3 times a day as needed.  Dispense: 180 Tab; Refill: 2    2. Anemia of chronic disease    Plan: 1.  Continue wound clinic  2.  Patient is apparently not yet a candidate for skin grafting due to active inflammation on the extensive wound edges of his left lower leg  3.  After discussion of the pros and cons of opiate therapy patient will be provided a prescription for plain oxycodone 5 mg twice daily  4.  After review of labs patient may also take oxycodone 1000 mg 3 times daily  5.  Revisit with me 1 month

## 2020-06-08 NOTE — TELEPHONE ENCOUNTER
I don't see updated plan of care in media, can you round with the other in office providers to ensure that this has been done? Thanks!

## 2020-06-11 ENCOUNTER — TELEPHONE (OUTPATIENT)
Dept: MEDICAL GROUP | Facility: MEDICAL CENTER | Age: 65
End: 2020-06-11

## 2020-06-11 NOTE — TELEPHONE ENCOUNTER
DOCUMENTATION OF PAR STATUS:    1. Name of Medication & Dose: Oxycodone 5 mg     2. Name of Prescription Coverage Company & phone #: Medicare/Medicaid    3. Date Prior Auth Submitted: 06/11/2020  Submitted on covermymeds: [id number]     4. What information was given to obtain insurance decision? Diagnosis etc.     5. Prior Auth Status? Pending    6. Patient Notified: yes       stated

## 2020-06-12 ENCOUNTER — OFFICE VISIT (OUTPATIENT)
Dept: WOUND CARE | Facility: MEDICAL CENTER | Age: 65
End: 2020-06-12
Attending: NURSE PRACTITIONER
Payer: MEDICARE

## 2020-06-12 VITALS
RESPIRATION RATE: 18 BRPM | SYSTOLIC BLOOD PRESSURE: 161 MMHG | DIASTOLIC BLOOD PRESSURE: 94 MMHG | TEMPERATURE: 98.7 F | HEART RATE: 91 BPM | OXYGEN SATURATION: 99 %

## 2020-06-12 DIAGNOSIS — L97.922 SKIN ULCER OF LEFT LOWER LEG WITH FAT LAYER EXPOSED (HCC): ICD-10-CM

## 2020-06-12 DIAGNOSIS — M21.962 ACQUIRED DEFORMITY OF LEFT ANKLE AND FOOT: ICD-10-CM

## 2020-06-12 DIAGNOSIS — L97.912 SKIN ULCER OF MULTIPLE SITES OF RIGHT LOWER EXTREMITY WITH FAT LAYER EXPOSED (HCC): ICD-10-CM

## 2020-06-12 DIAGNOSIS — I87.8 CHRONIC VENOUS STASIS: ICD-10-CM

## 2020-06-12 PROCEDURE — 11045 DBRDMT SUBQ TISS EACH ADDL: CPT | Performed by: NURSE PRACTITIONER

## 2020-06-12 PROCEDURE — 11045 DBRDMT SUBQ TISS EACH ADDL: CPT

## 2020-06-12 PROCEDURE — 11042 DBRDMT SUBQ TIS 1ST 20SQCM/<: CPT | Performed by: NURSE PRACTITIONER

## 2020-06-12 PROCEDURE — 11042 DBRDMT SUBQ TIS 1ST 20SQCM/<: CPT

## 2020-06-12 ASSESSMENT — ENCOUNTER SYMPTOMS
SHORTNESS OF BREATH: 0
CONSTIPATION: 0
NAUSEA: 0
COUGH: 0
CHILLS: 0
NERVOUS/ANXIOUS: 0
FEVER: 0
MYALGIAS: 0
DIARRHEA: 0
VOMITING: 0
DEPRESSION: 1

## 2020-06-12 NOTE — LETTER
2020      [facility]      Re:Goran Schaeferkimberly,  1955    Wound Care Order:    -Remove wraps and old dressings. Wash both legs with washcloth with soapy water.  -Rinse wounds with normal saline or wound cleanser.  -Apply skin moisturizer to intact skin.  -Apply zinc barrier paste to periwound  -Cover exposed tendon with adaptic (vaselin moistened gauze)  -Cover all wounds with silver hydrofiber dressing then abdominal pad or nonadhesive foam  -Apply 2 layer wrap to both legs.          Thank you,                Nancy ANGULO

## 2020-06-12 NOTE — PROGRESS NOTES
Provider Encounter- Full Thickness wound    HISTORY OF PRESENT ILLNESS  Wound History:    START OF CARE IN CLINIC: 5/20/2020               REFERRING PROVIDER: EKV Keith                 WOUND ETIOLOGY: venous,  likely mixed etiology              LOCATION: Left lower leg, multiple wounds                                   Right lower leg, multiple wounds-first observed in clinic on initial evaluation, 6/5/2020                HISTORY: Patient is very well-known to this clinic from previous treatment of wounds to his left lower extremity.  He was discharged from the clinic in March 2020 due to full resolution of his wound.  He returned to the clinic approximately 3 weeks later, on 4/16, with cellulitis, edema, and large open wounds to his left lower leg, and was sent directly to [facility] emergency room.  He was admitted for IV antibiotics and surgical intervention.  On 4/22/2020, he underwent an I&D of his left lower extremity, fasciotomy, and VAC placement.  In the following weeks he underwent surgery 3 more times for irrigation and debridement.  He was discharged home on 5/21, on the VAC, with home health and a referral to [facility].       Pertinent Medical History:  Anxiety, Charcot's joint of left foot, non-diabetic (3/21/2016), Chronic congestive heart failure (HCC) (11/16/2017), Hepatitis C, chronic (HCC), Hypertension, Migraine, Polysubstance abuse (HCC) (3/8/2018), Tobacco use (4/18/2016), Ulcer of left lower extremity with necrosis of muscle (HCC) (3/21/2016), and Venous stasis ulcer ([facility]) (2017).                ETIOLOGY HISTORY:  Vascular Surgeon: Dr. White. Compression Circ-aid. Varicose Veins none visible     TOBACCO USE: Patient denies; patient also denies alcohol and recreational drug use    Patient's problem list, allergies, and current medications reviewed and updated in Epic    Interval History:  6/5/2020: Initial clinic visit with KEV Brantley.  Patient returns to clinic after a  "long hospitalization.  The VAC was held by home health due to severe maceration and deterioration of wound beds.  He also presents today with new wounds to his right lower extremity.  He states he does not know how or when these occurred, states, \"they just popped up\".  He is uncertain whether or not he has follow-up appointments with his surgeon.  He does not know what medications he is on.      6/12/2020 : Clinic visit with KEV Brantley.  Goran states he is feeling well today,denies fevers, chills, nausea, vomiting, cough or shortness of breath.  Condition of his periwound skin is significantly improved.  I would like to restart VAC, however he did not bring the VAC with him, and that has already been on for 2-1/2 weeks.  Will likely need to be reordered.  Home health continues to see him several times per week for dressing changes.  He is tolerating compression without any difficulty.      REVIEW OF SYSTEMS:   Review of Systems   Constitutional: Negative for chills and fever.        States he is eating well, appetite normal   Respiratory: Negative for cough and shortness of breath.    Cardiovascular: Positive for leg swelling. Negative for chest pain.        Chronic swelling in legs, for several years   Gastrointestinal: Negative for constipation, diarrhea, nausea and vomiting.   Genitourinary: Negative for dysuria.   Musculoskeletal: Positive for joint pain. Negative for myalgias.        Chronic   Skin:        New wounds to right lower leg, patient does not know how or when they started  Hospitalized in April and May 2020 for left lower leg wound, underwent several surgeries   Psychiatric/Behavioral: Positive for depression. The patient is not nervous/anxious.         States he is a little bit depressed about his legs, denies wanting to harm himself       PHYSICAL EXAMINATION:   BP (!) 161/94   Pulse 91   Temp 37.1 °C (98.7 °F)   Resp 18   SpO2 99%     Physical Exam   Constitutional: He is oriented " to person, place, and time and well-developed, well-nourished, and in no distress.   Disheveled unkept   HENT:   Head: Normocephalic and atraumatic.   Eyes: Pupils are equal, round, and reactive to light. Conjunctivae and EOM are normal.   Neck: Neck supple.   Cardiovascular: Intact distal pulses.   Pulmonary/Chest: Effort normal.   Musculoskeletal: Normal range of motion.         General: Deformity and edema present.      Comments: Left charcot foot and ankle  3+pitting edema to bilateral LE   Neurological: He is alert and oriented to person, place, and time.   Skin: Skin is warm. There is erythema.   Large full-thickness wounds to bilateral lower extremities  Further wound flowsheet and photos   Psychiatric: Affect normal.   Patient lacks insight into his own healthcare issues, defers to his daughter to keep track of his medications and appointments       WOUND ASSESSMENT     Wound 06/05/20 Full Thickness Wound Leg Left LLE anteriolateral (Active)   Wound Image    06/12/20 0945   Site Assessment Red;Yellow;Pink 06/12/20 0945   Periwound Assessment Edema;Fragile;Dry;Other (Comment) 06/12/20 0945   Margins Attached edges 06/12/20 0945   Closure Secondary intention 06/05/20 0930   Drainage Amount Large 06/12/20 0945   Drainage Description Serosanguineous 06/12/20 0945   Treatments Cleansed;Topical Lidocaine;Provider debridement 06/12/20 0945   Wound Cleansing Normal Saline Irrigation 06/12/20 0945   Periwound Protectant Skin Moisturizer;Barrier Paste 06/12/20 0945   Dressing Cleansing/Solutions Normal Saline 06/05/20 0930   Dressing Options Petroleum Gauze (clear);Hydrofiber Silver;Absorbent Abdominal Pad;Compression Wrap Two Layer 06/12/20 0945   Dressing Changed Changed 06/05/20 0930   Dressing Status Clean;Dry;Intact 06/05/20 0930   Dressing Change/Treatment Frequency Every 48 hrs, and As Needed 06/05/20 0930   Non-staged Wound Description Full thickness 06/12/20 0945   Wound Length (cm) 20.5 cm 06/12/20 0945    Wound Width (cm) 14 cm 06/12/20 0945   Wound Depth (cm) 0.3 cm 06/12/20 0945   Wound Surface Area (cm^2) 287 cm^2 06/12/20 0945   Wound Volume (cm^3) 86.1 cm^3 06/12/20 0945   Post-Procedure Length (cm) 20.6 cm 06/12/20 0945   Post-Procedure Width (cm) 14.1 cm 06/12/20 0945   Post-Procedure Depth (cm) 0.3 cm 06/12/20 0945   Post-Procedure Surface Area (cm^2) 290.46 cm^2 06/12/20 0945   Post-Procedure Volume (cm^3) 87.14 cm^3 06/12/20 0945   Wound Healing % -58 06/12/20 0945   Wound Bed Granulation (%) 75 % 06/12/20 0945   Wound Bed Slough (%) 25 % 06/12/20 0945   Wound Bed Granulation (%) - Post-Procedure 100 % 06/12/20 0945   Wound Bed Slough % - (Post-Procedure) 5 % 06/05/20 0930   Tunneling (cm) 0 cm 06/05/20 0930   Undermining (cm) 0 cm 06/05/20 0930   Wound Odor None 06/12/20 0945   Pulses Left;2+;DP 06/05/20 0930   Exposed Structures Tendon 06/12/20 0945       Wound 06/05/20 Full Thickness Wound Ankle LLE distal posteriomedial (Active)   Wound Image    06/12/20 0945   Site Assessment Yellow;Red 06/12/20 0945   Periwound Assessment Edema;Dry;Other (Comment);Fragile 06/12/20 0945   Margins Attached edges 06/12/20 0945   Closure Secondary intention 06/05/20 0930   Drainage Amount Moderate 06/12/20 0945   Drainage Description Serosanguineous 06/12/20 0945   Treatments Cleansed;Topical Lidocaine;Provider debridement 06/12/20 0945   Wound Cleansing Normal Saline Irrigation 06/12/20 0945   Periwound Protectant Skin Moisturizer;Barrier Paste 06/12/20 0945   Dressing Cleansing/Solutions Normal Saline 06/05/20 0930   Dressing Options Hydrofiber Silver;Absorbent Abdominal Pad;Compression Wrap Two Layer 06/12/20 0945   Dressing Changed Changed 06/05/20 0930   Dressing Status Clean;Dry;Intact 06/05/20 0930   Dressing Change/Treatment Frequency Every 48 hrs, and As Needed 06/05/20 0930   Non-staged Wound Description Full thickness 06/12/20 0945   Wound Length (cm) 4 cm 06/12/20 0945   Wound Width (cm) 5 cm 06/12/20 0945    Wound Depth (cm) 0.3 cm 06/12/20 0945   Wound Surface Area (cm^2) 20 cm^2 06/12/20 0945   Wound Volume (cm^3) 6 cm^3 06/12/20 0945   Post-Procedure Length (cm) 4.1 cm 06/12/20 0945   Post-Procedure Width (cm) 5 cm 06/12/20 0945   Post-Procedure Depth (cm) 0.3 cm 06/12/20 0945   Post-Procedure Surface Area (cm^2) 20.5 cm^2 06/12/20 0945   Post-Procedure Volume (cm^3) 6.15 cm^3 06/12/20 0945   Wound Healing % -3 06/12/20 0945   Wound Bed Granulation (%) 65 % 06/12/20 0945   Wound Bed Slough (%) 35 % 06/12/20 0945   Wound Bed Granulation (%) - Post-Procedure 80 % 06/12/20 0945   Wound Bed Slough % - (Post-Procedure) 20 % 06/12/20 0945   Tunneling (cm) 0 cm 06/05/20 0930   Undermining (cm) 0 cm 06/05/20 0930   Wound Odor None 06/12/20 0945   Pulses Left;2+;DP 06/05/20 0930   Exposed Structures None 06/12/20 0945       Wound 06/05/20 Full Thickness Wound Leg Right RLE posterior (Active)   Wound Image    06/12/20 0945   Site Assessment Pink;Yellow 06/12/20 0945   Periwound Assessment Edema;Fragile;Dry;Other (Comment) 06/12/20 0945   Margins Attached edges 06/12/20 0945   Closure Secondary intention 06/05/20 0930   Drainage Amount Small 06/12/20 0945   Drainage Description Serosanguineous 06/12/20 0945   Treatments Cleansed;Topical Lidocaine;Provider debridement 06/12/20 0945   Wound Cleansing Normal Saline Irrigation 06/12/20 0945   Periwound Protectant Skin Moisturizer;Barrier Paste 06/12/20 0945   Dressing Cleansing/Solutions Normal Saline 06/05/20 0930   Dressing Options Hydrofiber Silver;Absorbent Abdominal Pad;Compression Wrap Two Layer 06/12/20 0945   Dressing Changed Changed 06/05/20 0930   Dressing Status Clean;Intact;Dry 06/05/20 0930   Dressing Change/Treatment Frequency Every 48 hrs, and As Needed 06/05/20 0930   Non-staged Wound Description Full thickness 06/12/20 0945   Wound Length (cm) 0.3 cm 06/12/20 0945   Wound Width (cm) 0.5 cm 06/12/20 0945   Wound Depth (cm) 0.1 cm 06/12/20 0945   Wound Surface Area  (cm^2) 0.15 cm^2 06/12/20 0945   Wound Volume (cm^3) 0.02 cm^3 06/12/20 0945   Post-Procedure Length (cm) 0.5 cm 06/12/20 0945   Post-Procedure Width (cm) 0.7 cm 06/12/20 0945   Post-Procedure Depth (cm) 0.1 cm 06/12/20 0945   Post-Procedure Surface Area (cm^2) 0.35 cm^2 06/12/20 0945   Post-Procedure Volume (cm^3) 0.04 cm^3 06/12/20 0945   Wound Healing % 95 06/12/20 0945   Wound Bed Granulation (%) 80 % 06/12/20 0945   Wound Bed Slough (%) 20 % 06/12/20 0945   Wound Bed Granulation (%) - Post-Procedure 100 % 06/12/20 0945   Wound Bed Slough % - (Post-Procedure) 30 % 06/05/20 0930   Tunneling (cm) 0 cm 06/05/20 0930   Undermining (cm) 0 cm 06/05/20 0930   Wound Odor None 06/12/20 0945   Pulses 2+;Right;DP 06/05/20 0930   Exposed Structures None 06/12/20 0945       Wound 06/05/20 Full Thickness Wound Leg Left LLE posteriomedial (Active)   Wound Image    06/12/20 0945   Site Assessment Yellow;Red 06/12/20 0945   Periwound Assessment Edema;Fragile;Dry;Other (Comment) 06/12/20 0945   Margins Attached edges 06/12/20 0945   Closure Secondary intention 06/05/20 0930   Drainage Amount Large 06/12/20 0945   Drainage Description Serosanguineous 06/12/20 0945   Treatments Cleansed;Topical Lidocaine;Provider debridement 06/12/20 0945   Wound Cleansing Normal Saline Irrigation 06/12/20 0945   Periwound Protectant Skin Moisturizer;Barrier Paste 06/12/20 0945   Dressing Cleansing/Solutions Normal Saline 06/05/20 0930   Dressing Options Hydrofiber Silver;Absorbent Abdominal Pad;Compression Wrap Two Layer 06/12/20 0945   Dressing Changed Changed 06/05/20 0930   Dressing Status Clean;Dry;Intact 06/05/20 0930   Dressing Change/Treatment Frequency Every 48 hrs, and As Needed 06/05/20 0930   Non-staged Wound Description Full thickness 06/12/20 0945   Wound Length (cm) 9.5 cm 06/12/20 0945   Wound Width (cm) 10 cm 06/12/20 0945   Wound Depth (cm) 0.2 cm 06/12/20 0945   Wound Surface Area (cm^2) 95 cm^2 06/12/20 0945   Wound Volume  (cm^3) 19 cm^3 06/12/20 0945   Post-Procedure Length (cm) 9.7 cm 06/12/20 0945   Post-Procedure Width (cm) 10.2 cm 06/12/20 0945   Post-Procedure Depth (cm) 0.3 cm 06/12/20 0945   Post-Procedure Surface Area (cm^2) 98.94 cm^2 06/12/20 0945   Post-Procedure Volume (cm^3) 29.68 cm^3 06/12/20 0945   Wound Healing % -151 06/12/20 0945   Wound Bed Granulation (%) 40 % 06/12/20 0945   Wound Bed Slough (%) 60 % 06/12/20 0945   Wound Bed Granulation (%) - Post-Procedure 80 % 06/12/20 0945   Wound Bed Slough % - (Post-Procedure) 20 % 06/12/20 0945   Tunneling (cm) 0 cm 06/05/20 0930   Undermining (cm) 0 cm 06/05/20 0930   Wound Odor None 06/12/20 0945   Pulses 2+;Left;DP 06/05/20 0930   Exposed Structures None 06/12/20 0945       Wound 06/05/20 Full Thickness Wound Leg Right RLE lateral (Active)   Wound Image    06/12/20 0945   Site Assessment Red;Yellow 06/12/20 0945   Periwound Assessment Edema;Fragile;Dry;Other (Comment) 06/12/20 0945   Margins Attached edges 06/12/20 0945   Closure Secondary intention 06/05/20 0930   Drainage Amount Moderate 06/12/20 0945   Drainage Description Serosanguineous 06/12/20 0945   Treatments Cleansed;Topical Lidocaine;Provider debridement 06/12/20 0945   Wound Cleansing Normal Saline Irrigation 06/12/20 0945   Periwound Protectant Skin Moisturizer;Barrier Paste 06/12/20 0945   Dressing Cleansing/Solutions Normal Saline 06/05/20 0930   Dressing Options Hydrofiber Silver;Absorbent Abdominal Pad;Compression Wrap Two Layer 06/12/20 0945   Dressing Changed Changed 06/05/20 0930   Dressing Status Clean;Dry;Intact 06/05/20 0930   Dressing Change/Treatment Frequency Every 48 hrs, and As Needed 06/05/20 0930   Non-staged Wound Description Full thickness 06/12/20 0945   Wound Length (cm) 10.8 cm 06/12/20 0945   Wound Width (cm) 4.3 cm 06/12/20 0945   Wound Depth (cm) 0.1 cm 06/12/20 0945   Wound Surface Area (cm^2) 46.44 cm^2 06/12/20 0945   Wound Volume (cm^3) 4.64 cm^3 06/12/20 0945   Post-Procedure  Length (cm) 11 cm 06/12/20 0945   Post-Procedure Width (cm) 4.5 cm 06/12/20 0945   Post-Procedure Depth (cm) 0.2 cm 06/12/20 0945   Post-Procedure Surface Area (cm^2) 49.5 cm^2 06/12/20 0945   Post-Procedure Volume (cm^3) 9.9 cm^3 06/12/20 0945   Wound Healing % 6 06/12/20 0945   Wound Bed Granulation (%) 20 % 06/12/20 0945   Wound Bed Slough (%) 80 % 06/12/20 0945   Wound Bed Granulation (%) - Post-Procedure 60 % 06/12/20 0945   Wound Bed Slough % - (Post-Procedure) 40 % 06/12/20 0945   Tunneling (cm) 0 cm 06/05/20 0930   Undermining (cm) 0 cm 06/05/20 0930   Wound Odor None 06/12/20 0945   Pulses 2+;DP;Right 06/05/20 0930   Exposed Structures None 06/12/20 0945       Wound 06/05/20 Full Thickness Wound Ankle Right lateral ankle (Active)   Wound Image    06/12/20 0945   Site Assessment Pink;Red;Yellow 06/12/20 0945   Periwound Assessment Edema;Fragile;Dry;Other (Comment) 06/12/20 0945   Margins Attached edges 06/12/20 0945   Closure Secondary intention 06/05/20 0930   Drainage Amount Small 06/12/20 0945   Drainage Description Serosanguineous 06/12/20 0945   Treatments Cleansed;Topical Lidocaine;Provider debridement 06/12/20 0945   Wound Cleansing Normal Saline Irrigation 06/12/20 0945   Periwound Protectant Skin Moisturizer;Barrier Paste 06/12/20 0945   Dressing Cleansing/Solutions Normal Saline 06/05/20 0930   Dressing Options Hydrofiber Silver;Nonadhesive Foam;Compression Wrap Two Layer 06/12/20 0945   Dressing Changed Changed 06/05/20 0930   Dressing Status Clean;Dry;Intact 06/05/20 0930   Dressing Change/Treatment Frequency Every 48 hrs, and As Needed 06/05/20 0930   Non-staged Wound Description Full thickness 06/12/20 0945   Wound Length (cm) 0.5 cm 06/12/20 0945   Wound Width (cm) 1.3 cm 06/12/20 0945   Wound Depth (cm) 0.1 cm 06/12/20 0945   Wound Surface Area (cm^2) 0.65 cm^2 06/12/20 0945   Wound Volume (cm^3) 0.06 cm^3 06/12/20 0945   Post-Procedure Length (cm) 0.7 cm 06/12/20 0945   Post-Procedure Width  (cm) 1.3 cm 06/12/20 0945   Post-Procedure Depth (cm) 0.1 cm 06/12/20 0945   Post-Procedure Surface Area (cm^2) 0.91 cm^2 06/12/20 0945   Post-Procedure Volume (cm^3) 0.09 cm^3 06/12/20 0945   Wound Healing % 80 06/12/20 0945   Wound Bed Granulation (%) 25 % 06/12/20 0945   Wound Bed Slough (%) 75 % 06/12/20 0945   Wound Bed Granulation (%) - Post-Procedure 35 % 06/12/20 0945   Wound Bed Slough % - (Post-Procedure) 65 % 06/12/20 0945   Tunneling (cm) 0 cm 06/05/20 0930   Undermining (cm) 0 cm 06/05/20 0930   Wound Odor None 06/12/20 0945   Pulses Bounding;Right;DP 06/05/20 0930   Exposed Structures None 06/12/20 0945       Wound 06/05/20 Full Thickness Wound Leg Right RLE anteriomedial (Active)   Wound Image    06/12/20 0945   Site Assessment Yellow;Pink 06/12/20 0945   Periwound Assessment Edema;Fragile;Dry;Other (Comment) 06/12/20 0945   Margins Attached edges 06/12/20 0945   Closure Secondary intention 06/05/20 0930   Drainage Amount Small 06/12/20 0945   Drainage Description Serosanguineous 06/12/20 0945   Treatments Cleansed;Topical Lidocaine;Provider debridement 06/12/20 0945   Wound Cleansing Normal Saline Irrigation 06/12/20 0945   Periwound Protectant Skin Moisturizer;Barrier Paste 06/12/20 0945   Dressing Cleansing/Solutions Normal Saline 06/05/20 0930   Dressing Options Hydrofiber Silver;Absorbent Abdominal Pad;Compression Wrap Two Layer 06/12/20 0945   Dressing Changed Changed 06/05/20 0930   Dressing Status Clean;Dry;Intact 06/05/20 0930   Dressing Change/Treatment Frequency Every 48 hrs, and As Needed 06/05/20 0930   Non-staged Wound Description Full thickness 06/12/20 0945   Wound Length (cm) 2.2 cm 06/12/20 0945   Wound Width (cm) 1.1 cm 06/12/20 0945   Wound Depth (cm) 0.1 cm 06/12/20 0945   Wound Surface Area (cm^2) 2.42 cm^2 06/12/20 0945   Wound Volume (cm^3) 0.24 cm^3 06/12/20 0945   Post-Procedure Length (cm) 2.6 cm 06/12/20 0945   Post-Procedure Width (cm) 1.1 cm 06/12/20 0945   Post-Procedure  Depth (cm) 0.1 cm 06/12/20 0945   Post-Procedure Surface Area (cm^2) 2.86 cm^2 06/12/20 0945   Post-Procedure Volume (cm^3) 0.29 cm^3 06/12/20 0945   Wound Healing % 73 06/12/20 0945   Wound Bed Granulation (%) 15 % 06/12/20 0945   Wound Bed Slough (%) 85 % 06/12/20 0945   Wound Bed Granulation (%) - Post-Procedure 60 % 06/12/20 0945   Wound Bed Slough % - (Post-Procedure) 40 % 06/12/20 0945   Tunneling (cm) 0 cm 06/05/20 0930   Undermining (cm) 0 cm 06/05/20 0930   Wound Odor None 06/12/20 0945   Pulses Right;2+;DP 06/05/20 0930   Exposed Structures None 06/12/20 0945           PROCEDURE: Excisional debridement of left lower extremity wounds x 3  -2% viscous lidocaine applied topically to wound beds for approximately 10 minutes prior to debridement  -Curette used to excise thick layer of slough from wound beds.  Excisional debridement was performed to remove devitalized tissue until healthy, bleeding tissue was visualized. Total area debrided approximately 400 cm².  Tissue debrided into the subcutaneous layer.    -Bleeding controlled with manual pressure.    -Wound care completed by wound RN, refer to flowsheet  -Patient tolerated the procedure well, without c/o pain or discomfort.     PROCEDURE: Excisional debridement of right lower extremity wounds x 4   -2% viscous lidocaine applied topically to wound beds for approximately 10 minutes prior to debridement  -Curette used to size slough from wound beds.  Excisional debridement was performed to remove devitalized tissue until healthy, bleeding tissue was visualized.  Total area debrided approximately 60 cm².  Tissue debrided into the subcutaneous layer.    -Bleeding controlled with manual pressure.    -Wound care completed by wound RN, refer to flowsheet  -Patient tolerated the procedure well, without c/o pain or discomfort.      Pertinent Labs and Diagnostics:    Labs:     A1c:   Lab Results   Component Value Date/Time    HBA1C 5.7 (H) 01/23/2020 11:22 AM           VASCULAR STUDIES: BHUPINDER 12/27/2019   Right.    Doppler waveform of the common femoral artery is of high amplitude and    triphasic.    Doppler waveforms at the ankle are brisk and triphasic.    Ankle-brachial index is normal.       Left.    Could not perform ankle-brachial index due to large blisters/ulcers.   Normal toe-brachial index: 0.94   Doppler waveform of the common femoral artery is of high amplitude and    triphasic.    Biphasic waveforms seen at the ankle.     WOUND CULTURE:    DATE: 4/22/2020 OR cultures  Culture Result  Abnormal     Pseudomonas aeruginosa   Light growth   P.aeruginosa may develop resistance during prolonged therapy   with all antibiotics. Isolates that are initially susceptible   may become resistant within three to four days after   initiation of therapy. Testing of repeat isolates may be   warranted.     Culture Result  Abnormal     Beta Hemolytic Streptococcus group A   Rare growth          ASSESSMENT AND PLAN:     1. Skin ulcer of left lower leg with fat layer exposed (HCC)  Comment: Patient has history of recurring ulcerations to left lower extremity.  Has undergone ablation procedures by Dr. White.  Discharged from [facility] in March 2020 due to healing, returned approximately 3 weeks later much worse.  Hospitalized from 4/16 until 5/21/2020 underwent multiple surgical debridements.    6/12/2020: Periwound of both legs significantly improved.  Maceration under control.  I feel patient would benefit from restarting VAC to left lower extremity wounds, however he did not bring VAC with him today, and VAC is been off for more than 2 weeks.  VAC was held a few weeks ago, apparently because patient was turning the VAC off for extensive periods of time, allowing fluid to leak out onto periwound.  -Contacted Mihaela Matute with [facility] to see what needs to be done to reauthorize VAC  -Patient to return to clinic weekly for assessment and debridement.  We will plan to have patient bring VAC  with him next clinic visit to restart on left lower extremity.  We will need to educate patient on the importance of leaving VAC on at all times, never to disconnect, as this apparently was a problem before.  -Home health to change dressing 1-2 times per week in between clinic visits.  Resume VAC dressing changes 3 times per week once we reinstate VAC.      Wound care: Adaptic over tendon, Silver Hydrofiber to manage exudate and bioburden, foam cover dressing, 2 layer compression wrap to manage edema    2. Skin ulcer of multiple sites of right lower extremity with fat layer exposed (HCC)  Comments: First observed in clinic on 6/5/2020, on initial evaluation. Patient is not certain when or how they occurred.  There is no documentation of wounds to this leg during recent hospitalization, so presumably they occurred after he went home.    6/12/2020:    Wound beds are done covered with thick layer of slough.   -Excisional debridement of wound in clinic today, medically necessary to promote wound healing.  t.  -Patient to return to clinic weekly for assessment and debridement  -Home health to change dressing 1-2 times per week in between clinic visits      Wound care: Silver Hydrofiber to manage exudate and bioburden, foam cover dressing, 2 layer compression wrap to manage edema        3. Chronic venous stasis  Comments: Hemosiderin staining, brawny edema, scarring- findings consistent with CVI.  Pt established at [facility], underwent ablation procedures in 2019.     -Consider referral back to vascular if wounds failed to progress or deteriorate.    4. Acquired deformity of left ankle and foot  Comments: Charcot deformity of foot and ankle.  Very little ROM of ankle.  Patient was referred to orthopedics previously, however did not make appointment.    -Patient will need to follow-up with orthopedics once wounds are healed.            PATIENT EDUCATION  - Pt advised to go to ER for any increased redness, swelling,  drainage or odor, or if he develops fever, chills, nausea or vomiting.        Please note that this note may have been created using voice recognition software. I have worked with technical experts from [facility] to optimize the interface.  I have made every reasonable attempt to correct obvious errors, but there may be errors of grammar and possibly content that I did not discover before finalizing the note.    N

## 2020-06-19 ENCOUNTER — OFFICE VISIT (OUTPATIENT)
Dept: WOUND CARE | Facility: MEDICAL CENTER | Age: 65
End: 2020-06-19
Attending: NURSE PRACTITIONER
Payer: MEDICARE

## 2020-06-19 ENCOUNTER — HOSPITAL ENCOUNTER (OUTPATIENT)
Facility: MEDICAL CENTER | Age: 65
End: 2020-06-19
Attending: NURSE PRACTITIONER
Payer: MEDICARE

## 2020-06-19 VITALS
SYSTOLIC BLOOD PRESSURE: 131 MMHG | OXYGEN SATURATION: 98 % | HEART RATE: 77 BPM | DIASTOLIC BLOOD PRESSURE: 85 MMHG | TEMPERATURE: 97.5 F | RESPIRATION RATE: 16 BRPM

## 2020-06-19 DIAGNOSIS — L97.922 SKIN ULCER OF LEFT LOWER LEG WITH FAT LAYER EXPOSED (HCC): Primary | ICD-10-CM

## 2020-06-19 DIAGNOSIS — L97.912 SKIN ULCER OF MULTIPLE SITES OF RIGHT LOWER EXTREMITY WITH FAT LAYER EXPOSED (HCC): ICD-10-CM

## 2020-06-19 DIAGNOSIS — T14.8XXA WOUND INFECTION: ICD-10-CM

## 2020-06-19 DIAGNOSIS — I87.8 CHRONIC VENOUS STASIS: ICD-10-CM

## 2020-06-19 DIAGNOSIS — L08.9 WOUND INFECTION: ICD-10-CM

## 2020-06-19 PROCEDURE — 87077 CULTURE AEROBIC IDENTIFY: CPT

## 2020-06-19 PROCEDURE — 11045 DBRDMT SUBQ TISS EACH ADDL: CPT

## 2020-06-19 PROCEDURE — 87070 CULTURE OTHR SPECIMN AEROBIC: CPT

## 2020-06-19 PROCEDURE — 87186 SC STD MICRODIL/AGAR DIL: CPT | Mod: 91

## 2020-06-19 PROCEDURE — 11045 DBRDMT SUBQ TISS EACH ADDL: CPT | Performed by: NURSE PRACTITIONER

## 2020-06-19 PROCEDURE — 87205 SMEAR GRAM STAIN: CPT

## 2020-06-19 PROCEDURE — 11042 DBRDMT SUBQ TIS 1ST 20SQCM/<: CPT | Performed by: NURSE PRACTITIONER

## 2020-06-19 PROCEDURE — 11042 DBRDMT SUBQ TIS 1ST 20SQCM/<: CPT

## 2020-06-19 ASSESSMENT — ENCOUNTER SYMPTOMS
NAUSEA: 0
MYALGIAS: 0
DEPRESSION: 1
FEVER: 0
NERVOUS/ANXIOUS: 0
SHORTNESS OF BREATH: 0
DIARRHEA: 0
COUGH: 0
CHILLS: 0
VOMITING: 0
CONSTIPATION: 0

## 2020-06-19 NOTE — LETTER
2020        To: [facility]    Re:Goran Chavez,  1955      Wound Care Order:    · Remove wraps and old dressings.   · Wash both legs with a washcloth and soapy water.  · Rinse wounds with normal saline or wound cleanser.  · Pat dry with gauze.  · Apply skin moisturizer to intact skin.  · Apply zinc barrier paste to periwound  · Cover exposed tendon with adaptic (vaselin moistened gauze)  · Cover all wounds with silver hydrofiber dressing then abdominal pad or nonadhesive foam  · Apply 2 layer compression wrap to both legs.      Patient will come into the [facility] clinic once a week for debridement, please see the patient 2 times a week for dressing changes.    Please call with any questions. Please instruct the patient to bring his wound vac machine with him to his next clinic appointment on 2020 at 1000.        Thank you,          __________________________________  Nancy ANGULO

## 2020-06-19 NOTE — PROGRESS NOTES
Provider Encounter- Full Thickness wound    HISTORY OF PRESENT ILLNESS  Wound History:    START OF CARE IN CLINIC: 5/20/2020               REFERRING PROVIDER: KEV Keith                 WOUND ETIOLOGY: venous,  likely mixed etiology              LOCATION: Left lower leg, multiple wounds                                   Right lower leg, multiple wounds-first observed in clinic on initial evaluation, 6/5/2020                HISTORY: Patient is very well-known to this clinic from previous treatment of wounds to his left lower extremity.  He was discharged from the clinic in March 2020 due to full resolution of his wound.  He returned to the clinic approximately 3 weeks later, on 4/16, with cellulitis, edema, and large open wounds to his left lower leg, and was sent directly to [facility] emergency room.  He was admitted for IV antibiotics and surgical intervention.  On 4/22/2020, he underwent an I&D of his left lower extremity, fasciotomy, and VAC placement.  In the following weeks he underwent surgery 3 more times for irrigation and debridement.  He was discharged home on 5/21, on the VAC, with home health and a referral to [facility].       Pertinent Medical History:  Anxiety, Charcot's joint of left foot, non-diabetic (3/21/2016), Chronic congestive heart failure (HCC) (11/16/2017), Hepatitis C, chronic (HCC), Hypertension, Migraine, Polysubstance abuse (HCC) (3/8/2018), Tobacco use (4/18/2016), Ulcer of left lower extremity with necrosis of muscle (HCC) (3/21/2016), and Venous stasis ulcer ([facility]) (2017).                ETIOLOGY HISTORY:  Vascular Surgeon: Dr. White. Compression Circ-aid. Varicose Veins none visible     TOBACCO USE: Patient denies; patient also denies alcohol and recreational drug use    Patient's problem list, allergies, and current medications reviewed and updated in Epic    Interval History:  6/5/2020: Initial clinic visit with KEV Brantley.  Patient returns to clinic after a  "long hospitalization.  The VAC was held by home health due to severe maceration and deterioration of wound beds.  He also presents today with new wounds to his right lower extremity.  He states he does not know how or when these occurred, states, \"they just popped up\".  He is uncertain whether or not he has follow-up appointments with his surgeon.  He does not know what medications he is on.      6/12/2020 : Clinic visit with KEV Brantley.  Goran states he is feeling well today,denies fevers, chills, nausea, vomiting, cough or shortness of breath.  Condition of his periwound skin is significantly improved.  I would like to restart VAC, however he did not bring the VAC with him, and that has already been on for 2-1/2 weeks.  Will likely need to be reordered.  Home health continues to see him several times per week for dressing changes.  He is tolerating compression without any difficulty.    6/19/2020 : Clinic visit with KEV Brantley.   Goran states he is feeling well today,denies fevers, chills, nausea, vomiting, cough or shortness of breath.  He brought his VAC with him today.  However,  too much slough on wound beds to consider re-applying.  Home health continues to see him 2 times per week.  I have asked him to bring the VAC with him again next week, and will hopefully place it at that time.  Per patient, dressings are saturated between dressing changes.      REVIEW OF SYSTEMS:   Review of Systems   Constitutional: Negative for chills and fever.        States he is eating well, appetite normal   Respiratory: Negative for cough and shortness of breath.    Cardiovascular: Positive for leg swelling. Negative for chest pain.        Chronic swelling in legs, for several years   Gastrointestinal: Negative for constipation, diarrhea, nausea and vomiting.   Genitourinary: Negative for dysuria.   Musculoskeletal: Positive for joint pain. Negative for myalgias.        Chronic   Skin:        New wounds to " right lower leg, patient does not know how or when they started  Hospitalized in April and May 2020 for left lower leg wound, underwent several surgeries   Psychiatric/Behavioral: Positive for depression. The patient is not nervous/anxious.         States he is a little bit depressed about his legs, denies wanting to harm himself       PHYSICAL EXAMINATION:   /85   Pulse 77   Temp 36.4 °C (97.5 °F)   Resp 16   SpO2 98%     Physical Exam   Constitutional: He is oriented to person, place, and time and well-developed, well-nourished, and in no distress.   Disheveled unkept   HENT:   Head: Normocephalic and atraumatic.   Eyes: Pupils are equal, round, and reactive to light. Conjunctivae and EOM are normal.   Neck: Neck supple.   Cardiovascular: Intact distal pulses.   Pulmonary/Chest: Effort normal.   Musculoskeletal: Normal range of motion.         General: Deformity and edema present.      Comments: Left charcot foot and ankle  3+pitting edema to bilateral LE   Neurological: He is alert and oriented to person, place, and time.   Skin: Skin is warm. There is erythema.   Large full-thickness wounds to bilateral lower extremities  Further wound flowsheet and photos   Psychiatric: Affect normal.   Patient lacks insight into his own healthcare issues, defers to his daughter to keep track of his medications and appointments       WOUND ASSESSMENT             Wound 06/05/20 Full Thickness Wound Leg Left LLE anteriolateral (Active)   Wound Image    06/19/20 1100   Site Assessment Red;Yellow;Pink;Slough 06/19/20 1100   Periwound Assessment Edema;Fragile;Other (Comment) 06/19/20 1100   Margins Attached edges 06/19/20 1100   Closure Secondary intention 06/19/20 1100   Drainage Amount Large 06/19/20 1100   Drainage Description Serosanguineous 06/19/20 1100   Treatments Cleansed;Topical Lidocaine;Provider debridement 06/19/20 1100   Wound Cleansing Foam Cleanser/Washcloth 06/19/20 1100   Periwound Protectant Skin  Moisturizer;Barrier Paste;Stoma Powder 06/19/20 1100   Dressing Cleansing/Solutions Not Applicable 06/19/20 1100   Dressing Options Petroleum Gauze (clear);Hydrofiber Silver;Hydrofiber;Absorbent Abdominal Pad;Compression Wrap Two Layer 06/19/20 1100   Dressing Changed Changed 06/19/20 1100   Dressing Status Clean;Dry;Intact 06/19/20 1100   Dressing Change/Treatment Frequency Every 48 hrs, and As Needed 06/19/20 1100   Non-staged Wound Description Full thickness 06/12/20 0945   Wound Length (cm) 21.5 cm 06/19/20 1100   Wound Width (cm) 9.5 cm 06/19/20 1100   Wound Depth (cm) 0.3 cm 06/12/20 0945   Wound Surface Area (cm^2) 204.25 cm^2 06/19/20 1100   Wound Volume (cm^3) 86.1 cm^3 06/12/20 0945   Post-Procedure Length (cm) 21.6 cm 06/19/20 1100   Post-Procedure Width (cm) 9.6 cm 06/19/20 1100   Post-Procedure Depth (cm) 0.1 cm 06/19/20 1100   Post-Procedure Surface Area (cm^2) 207.36 cm^2 06/19/20 1100   Post-Procedure Volume (cm^3) 20.74 cm^3 06/19/20 1100   Wound Healing % -58 06/12/20 0945   Wound Bed Granulation (%) 75 % 06/12/20 0945   Wound Bed Slough (%) 25 % 06/12/20 0945   Wound Bed Granulation (%) - Post-Procedure 100 % 06/12/20 0945   Wound Bed Slough % - (Post-Procedure) 5 % 06/05/20 0930   Tunneling (cm) 0 cm 06/19/20 1100   Undermining (cm) 0 cm 06/19/20 1100   Wound Odor None 06/19/20 1100   Pulses Left;2+;DP 06/05/20 0930   Exposed Structures Tendon 06/19/20 1100       Wound 06/05/20 Full Thickness Wound Ankle LLE distal posteriomedial (Active)   Wound Image    06/19/20 1100   Site Assessment Yellow;Red;Slough 06/19/20 1100   Periwound Assessment Edema;Dry;Other (Comment);Fragile 06/19/20 1100   Margins Attached edges 06/19/20 1100   Closure Secondary intention 06/19/20 1100   Drainage Amount Moderate 06/19/20 1100   Drainage Description Serosanguineous 06/19/20 1100   Treatments Cleansed;Topical Lidocaine;Provider debridement 06/19/20 1100   Wound Cleansing Foam Cleanser/Washcloth 06/19/20 1100    Periwound Protectant Skin Moisturizer;Barrier Paste;Stoma Powder 06/19/20 1100   Dressing Cleansing/Solutions Not Applicable 06/19/20 1100   Dressing Options Hydrofiber Silver;Absorbent Abdominal Pad;Compression Wrap Two Layer 06/19/20 1100   Dressing Changed Changed 06/19/20 1100   Dressing Status Clean;Dry;Intact 06/19/20 1100   Dressing Change/Treatment Frequency Every 48 hrs, and As Needed 06/19/20 1100   Non-staged Wound Description Full thickness 06/19/20 1100   Wound Length (cm) 5 cm 06/19/20 1100   Wound Width (cm) 3.1 cm 06/19/20 1100   Wound Depth (cm) 0.4 cm 06/19/20 1100   Wound Surface Area (cm^2) 15.5 cm^2 06/19/20 1100   Wound Volume (cm^3) 6.2 cm^3 06/19/20 1100   Post-Procedure Length (cm) 5.1 cm 06/19/20 1100   Post-Procedure Width (cm) 3.2 cm 06/19/20 1100   Post-Procedure Depth (cm) 0.4 cm 06/19/20 1100   Post-Procedure Surface Area (cm^2) 16.32 cm^2 06/19/20 1100   Post-Procedure Volume (cm^3) 6.53 cm^3 06/19/20 1100   Wound Healing % -6 06/19/20 1100   Wound Bed Granulation (%) 65 % 06/12/20 0945   Wound Bed Slough (%) 35 % 06/12/20 0945   Wound Bed Granulation (%) - Post-Procedure 80 % 06/12/20 0945   Wound Bed Slough % - (Post-Procedure) 20 % 06/12/20 0945   Tunneling (cm) 0 cm 06/19/20 1100   Undermining (cm) 0 cm 06/19/20 1100   Wound Odor None 06/19/20 1100   Pulses Left;2+;DP 06/05/20 0930   Exposed Structures None 06/19/20 1100       Wound 06/05/20 Full Thickness Wound Leg Left LLE posteriomedial (Active)   Wound Image    06/19/20 1100   Site Assessment Yellow;Red;Slough 06/19/20 1100   Periwound Assessment Edema;Fragile;Dry;Other (Comment) 06/19/20 1100   Margins Attached edges 06/19/20 1100   Closure Secondary intention 06/19/20 1100   Drainage Amount Moderate 06/19/20 1100   Drainage Description Serosanguineous 06/19/20 1100   Treatments Cleansed;Topical Lidocaine;Provider debridement 06/19/20 1100   Wound Cleansing Normal Saline Irrigation 06/19/20 1100   Periwound Protectant Skin  Moisturizer;Barrier Paste;Stoma Powder 06/19/20 1100   Dressing Cleansing/Solutions Not Applicable 06/19/20 1100   Dressing Options Hydrofiber Silver;Absorbent Abdominal Pad;Compression Wrap Two Layer 06/19/20 1100   Dressing Changed Changed 06/19/20 1100   Dressing Status Clean;Dry;Intact 06/19/20 1100   Dressing Change/Treatment Frequency Every 48 hrs, and As Needed 06/19/20 1100   Non-staged Wound Description Full thickness 06/19/20 1100   Wound Length (cm) 8 cm 06/19/20 1100   Wound Width (cm) 10.7 cm 06/19/20 1100   Wound Depth (cm) 0.1 cm 06/19/20 1100   Wound Surface Area (cm^2) 85.6 cm^2 06/19/20 1100   Wound Volume (cm^3) 8.56 cm^3 06/19/20 1100   Post-Procedure Length (cm) 8.1 cm 06/19/20 1100   Post-Procedure Width (cm) 10.7 cm 06/19/20 1100   Post-Procedure Depth (cm) 0.1 cm 06/19/20 1100   Post-Procedure Surface Area (cm^2) 86.67 cm^2 06/19/20 1100   Post-Procedure Volume (cm^3) 8.67 cm^3 06/19/20 1100   Wound Healing % -13 06/19/20 1100   Wound Bed Granulation (%) 40 % 06/12/20 0945   Wound Bed Slough (%) 60 % 06/12/20 0945   Wound Bed Granulation (%) - Post-Procedure 80 % 06/12/20 0945   Wound Bed Slough % - (Post-Procedure) 20 % 06/12/20 0945   Tunneling (cm) 0 cm 06/19/20 1100   Undermining (cm) 0 cm 06/19/20 1100   Wound Odor None 06/19/20 1100   Pulses 2+;Left;DP 06/05/20 0930   Exposed Structures None 06/19/20 1100       Wound 06/05/20 Full Thickness Wound Leg Right RLE lateral (Active)   Wound Image    06/19/20 1100   Site Assessment Red;Yellow;Slough 06/19/20 1100   Periwound Assessment Edema;Fragile;Dry;Other (Comment) 06/19/20 1100   Margins Attached edges 06/19/20 1100   Closure Secondary intention 06/19/20 1100   Drainage Amount Moderate 06/19/20 1100   Drainage Description Serosanguineous 06/19/20 1100   Treatments Cleansed;Topical Lidocaine;Provider debridement 06/19/20 1100   Wound Cleansing Normal Saline Irrigation 06/19/20 1100   Periwound Protectant Skin Moisturizer;Barrier  Paste;Stoma Powder 06/19/20 1100   Dressing Cleansing/Solutions Not Applicable 06/19/20 1100   Dressing Options Hydrofiber Silver;Absorbent Abdominal Pad;Compression Wrap Two Layer 06/19/20 1100   Dressing Changed Changed 06/19/20 1100   Dressing Status Clean;Dry;Intact 06/19/20 1100   Dressing Change/Treatment Frequency Every 48 hrs, and As Needed 06/19/20 1100   Non-staged Wound Description Full thickness 06/19/20 1100   Wound Length (cm) 10.6 cm 06/19/20 1100   Wound Width (cm) 4 cm 06/19/20 1100   Wound Depth (cm) 0.1 cm 06/19/20 1100   Wound Surface Area (cm^2) 42.4 cm^2 06/19/20 1100   Wound Volume (cm^3) 4.24 cm^3 06/19/20 1100   Post-Procedure Length (cm) 10.6 cm 06/19/20 1100   Post-Procedure Width (cm) 4.1 cm 06/19/20 1100   Post-Procedure Depth (cm) 0.2 cm 06/19/20 1100   Post-Procedure Surface Area (cm^2) 43.46 cm^2 06/19/20 1100   Post-Procedure Volume (cm^3) 8.69 cm^3 06/19/20 1100   Wound Healing % 14 06/19/20 1100   Wound Bed Granulation (%) 20 % 06/12/20 0945   Wound Bed Slough (%) 80 % 06/12/20 0945   Wound Bed Granulation (%) - Post-Procedure 60 % 06/12/20 0945   Wound Bed Slough % - (Post-Procedure) 40 % 06/12/20 0945   Tunneling (cm) 0 cm 06/19/20 1100   Undermining (cm) 0 cm 06/19/20 1100   Wound Odor None 06/19/20 1100   Pulses 2+;DP;Right 06/05/20 0930   Exposed Structures None 06/19/20 1100       Wound 06/05/20 Full Thickness Wound Ankle Right lateral ankle (Active)   Wound Image   06/19/20 1100   Site Assessment Red;[place]rington 06/19/20 1100   Periwound Assessment Edema;Fragile;Dry;Other (Comment) 06/19/20 1100   Margins Attached edges 06/19/20 1100   Closure Secondary intention 06/19/20 1100   Drainage Amount Small 06/19/20 1100   Drainage Description Serosanguineous 06/19/20 1100   Treatments Cleansed;Topical Lidocaine;Site care 06/19/20 1100   Wound Cleansing Foam Cleanser/Washcloth 06/19/20 1100   Periwound Protectant Skin Moisturizer;Barrier Paste;Stoma Powder 06/19/20 1100   Dressing  Cleansing/Solutions Not Applicable 06/19/20 1100   Dressing Options Hydrofiber Silver;Nonadhesive Foam;Compression Wrap Two Layer 06/19/20 1100   Dressing Changed Changed 06/19/20 1100   Dressing Status Clean;Dry;Intact 06/19/20 1100   Dressing Change/Treatment Frequency Every 48 hrs, and As Needed 06/19/20 1100   Non-staged Wound Description Full thickness 06/19/20 1100   Wound Length (cm) 2 cm 06/19/20 1100   Wound Width (cm) 3.4 cm 06/19/20 1100   Wound Depth (cm) 0.1 cm 06/19/20 1100   Wound Surface Area (cm^2) 6.8 cm^2 06/19/20 1100   Wound Volume (cm^3) 0.68 cm^3 06/19/20 1100   Post-Procedure Length (cm) 0.7 cm 06/12/20 0945   Post-Procedure Width (cm) 1.3 cm 06/12/20 0945   Post-Procedure Depth (cm) 0.1 cm 06/12/20 0945   Post-Procedure Surface Area (cm^2) 0.91 cm^2 06/12/20 0945   Post-Procedure Volume (cm^3) 0.09 cm^3 06/12/20 0945   Wound Healing % -127 06/19/20 1100   Wound Bed Granulation (%) 25 % 06/12/20 0945   Wound Bed Slough (%) 75 % 06/12/20 0945   Wound Bed Granulation (%) - Post-Procedure 35 % 06/12/20 0945   Wound Bed Slough % - (Post-Procedure) 65 % 06/12/20 0945   Tunneling (cm) 0 cm 06/19/20 1100   Undermining (cm) 0 cm 06/19/20 1100   Wound Odor None 06/19/20 1100   Pulses Bounding;Right;DP 06/05/20 0930   Exposed Structures None 06/19/20 1100       Wound 06/05/20 Full Thickness Wound Leg Right RLE anteriomedial (Active)   Wound Image   06/19/20 1100   Site Assessment Brown;Dry 06/19/20 1100   Periwound Assessment Edema;Fragile;Dry;Other (Comment) 06/19/20 1100   Margins Attached edges 06/12/20 0945   Closure Secondary intention 06/05/20 0930   Drainage Amount None 06/19/20 1100   Drainage Description Serosanguineous 06/12/20 0945   Treatments Cleansed;Site care 06/19/20 1100   Wound Cleansing Foam Cleanser/Washcloth 06/19/20 1100   Periwound Protectant Skin Moisturizer 06/19/20 1100   Dressing Cleansing/Solutions Normal Saline 06/05/20 0930   Dressing Options Compression Wrap Two Layer  06/19/20 1100   Dressing Changed Changed 06/19/20 1100   Dressing Status Clean;Dry;Intact 06/19/20 1100   Dressing Change/Treatment Frequency Every 48 hrs, and As Needed 06/19/20 1100   Non-staged Wound Description Full thickness 06/12/20 0945   Wound Length (cm) 2.2 cm 06/19/20 1100   Wound Width (cm) 1.1 cm 06/19/20 1100   Wound Depth (cm) 0.1 cm 06/12/20 0945   Wound Surface Area (cm^2) 2.42 cm^2 06/19/20 1100   Wound Volume (cm^3) 0.24 cm^3 06/12/20 0945   Post-Procedure Length (cm) 2.6 cm 06/12/20 0945   Post-Procedure Width (cm) 1.1 cm 06/12/20 0945   Post-Procedure Depth (cm) 0.1 cm 06/12/20 0945   Post-Procedure Surface Area (cm^2) 2.86 cm^2 06/12/20 0945   Post-Procedure Volume (cm^3) 0.29 cm^3 06/12/20 0945   Wound Healing % 73 06/12/20 0945   Wound Bed Granulation (%) 15 % 06/12/20 0945   Wound Bed Slough (%) 85 % 06/12/20 0945   Wound Bed Granulation (%) - Post-Procedure 60 % 06/12/20 0945   Wound Bed Slough % - (Post-Procedure) 40 % 06/12/20 0945   Tunneling (cm) 0 cm 06/19/20 1100   Undermining (cm) 0 cm 06/19/20 1100   Wound Odor None 06/19/20 1100   Pulses Right;2+;DP 06/05/20 0930   Exposed Structures None 06/19/20 1100              PROCEDURE: Excisional debridement of left lower extremity wounds x 3  -2% viscous lidocaine applied topically to wound beds for approximately 10 minutes prior to debridement  -Curette used to excise thick layer of slough from wound beds.  Excisional debridement was performed to remove devitalized tissue until healthy, bleeding tissue was visualized. Total area debrided  310.35 cm².  Tissue debrided into the subcutaneous layer.    -Bleeding controlled with manual pressure.    -Wounds cleansed with normal saline  -Wound culture collected, wound care completed by  RN, refer to flowsheet  -Patient tolerated the procedure well, without c/o pain or discomfort.     PROCEDURE: Excisional debridement of right lower extremity wounds x 4   -2% viscous lidocaine applied topically to  wound beds for approximately 10 minutes prior to debridement  -Curette used to size slough from wound beds.  Excisional debridement was performed to remove devitalized tissue until healthy, bleeding tissue was visualized.  Total area debrided  43.46 cm².  Tissue debrided into the subcutaneous layer.    -Bleeding controlled with manual pressure.    -Wound care completed by wound RN, refer to flowsheet  -Patient tolerated the procedure well, without c/o pain or discomfort.      Pertinent Labs and Diagnostics:    Labs:     A1c:   Lab Results   Component Value Date/Time    HBA1C 5.7 (H) 01/23/2020 11:22 AM          VASCULAR STUDIES: BHUPINDER 12/27/2019   Right.    Doppler waveform of the common femoral artery is of high amplitude and    triphasic.    Doppler waveforms at the ankle are brisk and triphasic.    Ankle-brachial index is normal.       Left.    Could not perform ankle-brachial index due to large blisters/ulcers.   Normal toe-brachial index: 0.94   Doppler waveform of the common femoral artery is of high amplitude and    triphasic.    Biphasic waveforms seen at the ankle.     WOUND CULTURE:    DATE: 6/19/2020-culture collected in clinic        ASSESSMENT AND PLAN:     1. Skin ulcer of left lower leg with fat layer exposed (HCC)  Comment: Patient has history of recurring ulcerations to left lower extremity.  Has undergone ablation procedures by Dr. White.  [facility]charged from [facility] in March 2020 due to healing, returned approximately 3 weeks later much worse.  Hospitalized from 4/16 until 5/21/2020 underwent multiple surgical debridements.    6/19/2020: Periwound of both legs intact.  Maceration appears to be under control.  However, thick layer of slough buildup on all the wound beds, and dressings saturated despite being changed 3 times per week.  Suspect possible underlying infection.  -Excisional debridement of wound in clinic today, medically necessary to promote wound healing.  -Wound culture collected after  debridement.  Follow culture results  -Patient to return to clinic weekly for assessment and debridement.  We will hopefully be able to restart VAC next clinic visit.  Patient has been instructed to bring VAC with him.  -Home health to change dressing 2 times per week in between clinic visits      Wound care: Adaptic over tendon, Silver Hydrofiber to manage exudate and bioburden, foam cover dressing, 2 layer compression wrap to manage edema    2. Skin ulcer of multiple sites of right lower extremity with fat layer exposed (HCC)  Comments: First observed in clinic on 6/5/2020, on initial evaluation. Patient is not certain when or how they occurred.  There is no documentation of wounds to this leg during recent hospitalization, so presumably they occurred after he went home.    6/19/2020:    Wound beds are done covered with thick layer of slough.   -Excisional debridement of wound in clinic today, medically necessary to promote wound healing.  t.  -Patient to return to clinic weekly for assessment and debridement  -Home health to change dressing 2 times per week in between clinic visits  -See above      Wound care: Silver Hydrofiber to manage exudate and bioburden, foam cover dressing, 2 layer compression wrap to manage edema        3. Chronic venous stasis  Comments: Hemosiderin staining, brawny edema, scarring- findings consistent with CVI.  Pt established at [facility], underwent ablation procedures in 2019.     -Consider referral back to vascular if wounds failed to progress or deteriorate.    4. Acquired deformity of left ankle and foot  Comments: Charcot deformity of foot and ankle.  Very little ROM of ankle.  Patient was referred to orthopedics previously, however did not make appointment.    -Patient will need to follow-up with orthopedics once wounds are healed.            PATIENT EDUCATION  - Pt advised to go to ER for any increased redness, swelling, drainage or odor, or if he develops fever, chills,  nausea or vomiting.        Please note that this note may have been created using voice recognition software. I have worked with technical experts from [facility] to optimize the interface.  I have made every reasonable attempt to correct obvious errors, but there may be errors of grammar and possibly content that I did not discover before finalizing the note.    N

## 2020-06-19 NOTE — NON-PROVIDER
Wound care orders faxed to [facility] at [phone].

## 2020-06-19 NOTE — PATIENT INSTRUCTIONS
-Keep your wound dressing clean, dry, and intact.    -Remove your compression wrap if you have severe pain, severe swelling, numbness, color change, or temperature change in your toes. If you need to remove your compression wrap, do so by unrolling it. Do not cut the compression wrap off to prevent cutting yourself on accident.    -Should you experience any significant changes in your wound(s), such as infection (redness, swelling, localized heat, increased pain, fever > 101 F, chills) or have any questions regarding your home care instructions, please contact the wound center at [phone]. If after hours, contact your primary care physician or go to the hospital emergency room.

## 2020-06-20 LAB
GRAM STN SPEC: NORMAL
SIGNIFICANT IND 70042: NORMAL
SITE SITE: NORMAL
SOURCE SOURCE: NORMAL

## 2020-06-22 ENCOUNTER — TELEPHONE (OUTPATIENT)
Dept: WOUND CARE | Facility: MEDICAL CENTER | Age: 65
End: 2020-06-22

## 2020-06-22 NOTE — TELEPHONE ENCOUNTER
Patient Name: Goran Chavez     Ordering Provider: [phone]      [facility] lab staff called to inform provider of patient's positive culture results. Positive result reported out to Sanju Guzman on 6/22/2020.

## 2020-06-26 ENCOUNTER — OFFICE VISIT (OUTPATIENT)
Dept: WOUND CARE | Facility: MEDICAL CENTER | Age: 65
End: 2020-06-26
Attending: NURSE PRACTITIONER
Payer: MEDICARE

## 2020-06-26 VITALS
RESPIRATION RATE: 16 BRPM | OXYGEN SATURATION: 98 % | DIASTOLIC BLOOD PRESSURE: 67 MMHG | HEART RATE: 73 BPM | TEMPERATURE: 97.4 F | SYSTOLIC BLOOD PRESSURE: 112 MMHG

## 2020-06-26 DIAGNOSIS — L97.912 SKIN ULCER OF MULTIPLE SITES OF RIGHT LOWER EXTREMITY WITH FAT LAYER EXPOSED (HCC): ICD-10-CM

## 2020-06-26 DIAGNOSIS — T14.8XXA WOUND INFECTION: ICD-10-CM

## 2020-06-26 DIAGNOSIS — L08.9 WOUND INFECTION: ICD-10-CM

## 2020-06-26 DIAGNOSIS — L97.922 SKIN ULCER OF LEFT LOWER LEG WITH FAT LAYER EXPOSED (HCC): ICD-10-CM

## 2020-06-26 DIAGNOSIS — M21.962 ACQUIRED DEFORMITY OF LEFT ANKLE AND FOOT: ICD-10-CM

## 2020-06-26 DIAGNOSIS — I87.8 CHRONIC VENOUS STASIS: ICD-10-CM

## 2020-06-26 PROCEDURE — 11045 DBRDMT SUBQ TISS EACH ADDL: CPT

## 2020-06-26 PROCEDURE — 11042 DBRDMT SUBQ TIS 1ST 20SQCM/<: CPT

## 2020-06-26 PROCEDURE — 99211 OFF/OP EST MAY X REQ PHY/QHP: CPT | Mod: 25

## 2020-06-26 PROCEDURE — 11045 DBRDMT SUBQ TISS EACH ADDL: CPT | Performed by: NURSE PRACTITIONER

## 2020-06-26 PROCEDURE — 11042 DBRDMT SUBQ TIS 1ST 20SQCM/<: CPT | Performed by: NURSE PRACTITIONER

## 2020-06-26 ASSESSMENT — ENCOUNTER SYMPTOMS
DEPRESSION: 1
FEVER: 0
CHILLS: 0
MYALGIAS: 0
VOMITING: 0
NERVOUS/ANXIOUS: 0
DIARRHEA: 0
NAUSEA: 0
COUGH: 0
SHORTNESS OF BREATH: 0
CONSTIPATION: 0

## 2020-06-26 ASSESSMENT — PAIN SCALES - GENERAL: PAINLEVEL: 5=MODERATE PAIN

## 2020-06-26 NOTE — LETTER
2020      To: [facility]    Re:Goran Chavez,  1955      Wound Care Order: All wounds    · Remove wraps and old dressings.   · Wash both legs with a washcloth and soapy water.  · Rinse wounds with normal saline or wound cleanser.  · Pat dry with gauze.  · Apply skin moisturizer to intact skin.  · Apply zinc barrier paste to periwound  · Cover exposed tendon with adaptic (vaselin moistened gauze)  · Apply Puracyn Gel (given to patient) to all wound beds.  · Apply fenestrated Hydrofera Blue ready to all wounds- Make sure the shiny side with the writing is NOT against the wound bed.  · Cover all wounds with abdominal pad or nonadhesive foam  · Apply 2 layer compression wrap to both legs.      Patient will come into the [facility] clinic twice a week as frequent dressing changes are necessary. Please see the patient once over the weekend and 2 times a week for dressing changes (Monday and Wednesday).    Please call with any questions. Please instruct the patient to bring his wound vac machine with him to his next clinic appointment.        Thank you,          __________________________________  Nancy ANGULO

## 2020-06-26 NOTE — WOUND TEAM
Home Health Orders faxed to [facility] at [phone]

## 2020-06-26 NOTE — PROGRESS NOTES
Provider Encounter- Full Thickness wound    HISTORY OF PRESENT ILLNESS  Wound History:    START OF CARE IN CLINIC: 5/20/2020               REFERRING PROVIDER: KEV Keith                 WOUND ETIOLOGY: venous,  likely mixed etiology              LOCATION: Left lower leg, multiple wounds                                   Right lower leg, multiple wounds-first observed in clinic on initial evaluation, 6/5/2020                HISTORY: Patient is very well-known to this clinic from previous treatment of wounds to his left lower extremity.  He was discharged from the clinic in March 2020 due to full resolution of his wound.  He returned to the clinic approximately 3 weeks later, on 4/16, with cellulitis, edema, and large open wounds to his left lower leg, and was sent directly to [facility] emergency room.  He was admitted for IV antibiotics and surgical intervention.  On 4/22/2020, he underwent an I&D of his left lower extremity, fasciotomy, and VAC placement.  In the following weeks he underwent surgery 3 more times for irrigation and debridement.  He was discharged home on 5/21, on the VAC, with home health and a referral to [facility].       Pertinent Medical History:  Anxiety, Charcot's joint of left foot, non-diabetic (3/21/2016), Chronic congestive heart failure (HCC) (11/16/2017), Hepatitis C, chronic (HCC), Hypertension, Migraine, Polysubstance abuse (HCC) (3/8/2018), Tobacco use (4/18/2016), Ulcer of left lower extremity with necrosis of muscle (HCC) (3/21/2016), and Venous stasis ulcer ([facility]) (2017).                ETIOLOGY HISTORY:  Vascular Surgeon: Dr. White. Compression Circ-aid. Varicose Veins none visible     TOBACCO USE: Patient denies; patient also denies alcohol and recreational drug use    Patient's problem list, allergies, and current medications reviewed and updated in Epic    Interval History:  6/5/2020: Initial clinic visit with KEV Brantley.  Patient returns to clinic after a  "long hospitalization.  The VAC was held by home health due to severe maceration and deterioration of wound beds.  He also presents today with new wounds to his right lower extremity.  He states he does not know how or when these occurred, states, \"they just popped up\".  He is uncertain whether or not he has follow-up appointments with his surgeon.  He does not know what medications he is on.      6/12/2020 : Clinic visit with KEV Brantley.  Goran states he is feeling well today,denies fevers, chills, nausea, vomiting, cough or shortness of breath.  Condition of his periwound skin is significantly improved.  I would like to restart VAC, however he did not bring the VAC with him, and that has already been on for 2-1/2 weeks.  Will likely need to be reordered.  [facility] continues to see him several times per week for dressing changes.  He is tolerating compression without any difficulty.    6/19/2020 : Clinic visit with KEV Brantley.   Goran states he is feeling well today,denies fevers, chills, nausea, vomiting, cough or shortness of breath.  He brought his VAC with him today.  However,  too much slough on wound beds to consider re-applying.  [facility] continues to see him 2 times per week.  I have asked him to bring the VAC with him again next week, and will hopefully place it at that time.  Per patient, dressings are saturated between dressing changes.    6/26/2020 : Clinic visit with KEV Brantley.   Goran states he is feeling well today,denies fevers, chills, nausea, vomiting, cough or shortness of breath.  He brought his VAC with him again today, however we are not able to put it on as he is still having twice drainage.  Sensitivities from culture still pending.  However I did start him on Augmentin today which we are able to obtain from [facility] prior to his leaving the clinic today and had him take one before he left.  [facility] is been seen 2 times per week in between clinic visits.  " We will try and get them to see him over the weekend to do an additional dressing change.  Will adjust antibiotics once sensitivities are known.  Hopefully start VAC next clinic visit.      REVIEW OF SYSTEMS:   Review of Systems   Constitutional: Negative for chills and fever.        States he is eating well, appetite normal   Respiratory: Negative for cough and shortness of breath.    Cardiovascular: Positive for leg swelling. Negative for chest pain.        Chronic swelling in legs, for several years   Gastrointestinal: Negative for constipation, diarrhea, nausea and vomiting.   Genitourinary: Negative for dysuria.   Musculoskeletal: Positive for joint pain. Negative for myalgias.        Chronic   Skin:        New wounds to right lower leg, patient does not know how or when they started  Hospitalized in April and May 2020 for left lower leg wound, underwent several surgeries   Psychiatric/Behavioral: Positive for depression. The patient is not nervous/anxious.         States he is a little bit depressed about his legs, denies wanting to harm himself       PHYSICAL EXAMINATION:   /67   Pulse 73   Temp 36.3 °C (97.4 °F)   Resp 16   SpO2 98%     Physical Exam   Constitutional: He is oriented to person, place, and time and well-developed, well-nourished, and in no distress.   Disheveled unkept   HENT:   Head: Normocephalic and atraumatic.   Eyes: Pupils are equal, round, and reactive to light. Conjunctivae and EOM are normal.   Neck: Neck supple.   Cardiovascular: Intact distal pulses.   Pulmonary/Chest: Effort normal.   Musculoskeletal: Normal range of motion.         General: Deformity and edema present.      Comments: Left charcot foot and ankle  3+pitting edema to bilateral LE   Neurological: He is alert and oriented to person, place, and time.   Skin: Skin is warm. There is erythema.   Large full-thickness wounds to bilateral lower extremities  Further wound flowsheet and photos   Psychiatric: Affect  normal.   Patient lacks insight into his own healthcare issues, defers to his daughter to keep track of his medications and appointments       WOUND ASSESSMENT         Wound 06/05/20 Full Thickness Wound Leg Left LLE anteriolateral (Active)   Wound Image    06/26/20 1035   Site Assessment Red;Yellow;Pink;Slough 06/26/20 1035   Periwound Assessment Edema;Fragile;Other (Comment) 06/26/20 1035   Margins Attached edges 06/26/20 1035   Closure Secondary intention 06/26/20 1035   Drainage Amount Large 06/26/20 1035   Drainage Description Serosanguineous 06/26/20 1035   Treatments Cleansed;Topical Lidocaine;Provider debridement 06/26/20 1035   Wound Cleansing Puracyn [place] 06/26/20 1035   Periwound Protectant Barrier Paste 06/26/20 1035   Dressing Cleansing/Solutions Not Applicable 06/26/20 1035   Dressing Options Puracyn Gel;Petroleum Gauze (clear);Hydrofera Blue Ready;Absorbent Abdominal Pad;Compression Wrap Two Layer 06/26/20 1035   Dressing Changed New 06/26/20 1035   Dressing Status Clean;Dry;Intact 06/26/20 1035   Dressing Change/Treatment Frequency Every 48 hrs, and As Needed 06/26/20 1035   Non-staged Wound Description Full thickness 06/26/20 1035   Wound Length (cm) 19.5 cm 06/26/20 1035   Wound Width (cm) 8 cm 06/26/20 1035   Wound Depth (cm) 0.3 cm 06/12/20 0945   Wound Surface Area (cm^2) 156 cm^2 06/26/20 1035   Wound Volume (cm^3) 86.1 cm^3 06/12/20 0945   Post-Procedure Length (cm) 19.5 cm 06/26/20 1035   Post-Procedure Width (cm) 8 cm 06/26/20 1035   Post-Procedure Depth (cm) 0.4 cm 06/26/20 1035   Post-Procedure Surface Area (cm^2) 156 cm^2 06/26/20 1035   Post-Procedure Volume (cm^3) 62.4 cm^3 06/26/20 1035   Wound Healing % -58 06/12/20 0945   Wound Bed Granulation (%) 75 % 06/12/20 0945   Wound Bed Slough (%) 25 % 06/12/20 0945   Wound Bed Granulation (%) - Post-Procedure 100 % 06/12/20 0945   Wound Bed Slough % - (Post-Procedure) 5 % 06/05/20 0930   Tunneling (cm) 0 cm 06/26/20 1035   Undermining (cm)  0 cm 06/26/20 1035   Wound Odor Mild;Foul 06/26/20 1035   Pulses Left;2+;DP 06/05/20 0930   Exposed Structures Tendon 06/26/20 1035       Wound 06/05/20 Full Thickness Wound Ankle LLE distal posteriomedial (Active)   Wound Image    06/26/20 1035   Site Assessment Yellow;Red;Slough 06/26/20 1035   Periwound Assessment Edema;Dry;Other (Comment);Fragile 06/26/20 1035   Margins Attached edges 06/26/20 1035   Closure Secondary intention 06/26/20 1035   Drainage Amount Moderate 06/26/20 1035   Drainage Description Serosanguineous 06/26/20 1035   Treatments Cleansed;Topical Lidocaine;Provider debridement 06/26/20 1035   Wound Cleansing Puracyn [place] 06/26/20 1035   Periwound Protectant Barrier Paste 06/26/20 1035   Dressing Cleansing/Solutions Not Applicable 06/26/20 1035   Dressing Options Puracyn Gel;Hydrofera Blue Ready;Absorbent Abdominal Pad;Compression Wrap Two Layer;Other (Comments) 06/26/20 1035   Dressing Changed New 06/26/20 1035   Dressing Status Clean;Dry;Intact 06/26/20 1035   Dressing Change/Treatment Frequency Every 48 hrs, and As Needed 06/26/20 1035   Non-staged Wound Description Full thickness 06/26/20 1035   Wound Length (cm) 3.5 cm 06/26/20 1035   Wound Width (cm) 6 cm 06/26/20 1035   Wound Depth (cm) 0.4 cm 06/19/20 1100   Wound Surface Area (cm^2) 21 cm^2 06/26/20 1035   Wound Volume (cm^3) 6.2 cm^3 06/19/20 1100   Post-Procedure Length (cm) 3.5 cm 06/26/20 1035   Post-Procedure Width (cm) 6 cm 06/26/20 1035   Post-Procedure Depth (cm) 0.3 cm 06/26/20 1035   Post-Procedure Surface Area (cm^2) 21 cm^2 06/26/20 1035   Post-Procedure Volume (cm^3) 6.3 cm^3 06/26/20 1035   Wound Healing % -6 06/19/20 1100   Wound Bed Granulation (%) 65 % 06/12/20 0945   Wound Bed Slough (%) 35 % 06/12/20 0945   Wound Bed Granulation (%) - Post-Procedure 80 % 06/12/20 0945   Wound Bed Slough % - (Post-Procedure) 20 % 06/12/20 0945   Tunneling (cm) 0 cm 06/26/20 1035   Undermining (cm) 0 cm 06/26/20 1035   Wound Odor None  06/26/20 1035   Pulses Left;2+;DP 06/05/20 0930   Exposed Structures None 06/26/20 1035       Wound 06/05/20 Full Thickness Wound Leg Left LLE posteriomedial (Active)   Wound Image    06/26/20 1035   Site Assessment Yellow;Red;Slough 06/26/20 1035   Periwound Assessment Edema;Fragile;Dry;Other (Comment) 06/26/20 1035   Margins Attached edges 06/26/20 1035   Closure Secondary intention 06/26/20 1035   Drainage Amount Moderate 06/26/20 1035   Drainage Description Serosanguineous 06/26/20 1035   Treatments Cleansed;Topical Lidocaine;Provider debridement 06/26/20 1035   Wound Cleansing Puracyn [place] 06/26/20 1035   Periwound Protectant Barrier Paste 06/26/20 1035   Dressing Cleansing/Solutions Not Applicable 06/26/20 1035   Dressing Options Puracyn Gel;Hydrofera Blue Ready;Absorbent Abdominal Pad;Compression Wrap Two Layer;Other (Comments) 06/26/20 1035   Dressing Changed New 06/26/20 1035   Dressing Status Clean;Dry;Intact 06/26/20 1035   Dressing Change/Treatment Frequency Every 48 hrs, and As Needed 06/26/20 1035   Non-staged Wound Description Full thickness 06/26/20 1035   Wound Length (cm) 7.7 cm 06/26/20 1035   Wound Width (cm) 11.7 cm 06/26/20 1035   Wound Depth (cm) 0.2 cm 06/26/20 1035   Wound Surface Area (cm^2) 90.09 cm^2 06/26/20 1035   Wound Volume (cm^3) 18.02 cm^3 06/26/20 1035   Post-Procedure Length (cm) 7.3 cm 06/26/20 1035   Post-Procedure Width (cm) 11.2 cm 06/26/20 1035   Post-Procedure Depth (cm) 0.2 cm 06/26/20 1035   Post-Procedure Surface Area (cm^2) 81.76 cm^2 06/26/20 1035   Post-Procedure Volume (cm^3) 16.35 cm^3 06/26/20 1035   Wound Healing % -138 06/26/20 1035   Wound Bed Granulation (%) 40 % 06/12/20 0945   Wound Bed Slough (%) 60 % 06/12/20 0945   Wound Bed Granulation (%) - Post-Procedure 80 % 06/12/20 0945   Wound Bed Slough % - (Post-Procedure) 20 % 06/12/20 0945   Tunneling (cm) 0 cm 06/26/20 1035   Undermining (cm) 0 cm 06/26/20 1035   Wound Odor None 06/26/20 1035   Pulses  2+;Left;DP 06/05/20 0930   Exposed Structures None 06/26/20 1035       Wound 06/05/20 Full Thickness Wound Leg Right RLE lateral (Active)   Wound Image    06/26/20 1035   Site Assessment Red;Yellow;Slough 06/26/20 1035   Periwound Assessment Edema;Fragile;Dry;Other (Comment) 06/26/20 1035   Margins Attached edges 06/26/20 1035   Closure Secondary intention 06/26/20 1035   Drainage Amount Moderate 06/26/20 1035   Drainage Description Serosanguineous 06/26/20 1035   Treatments Cleansed;Topical Lidocaine;Provider debridement 06/26/20 1035   Wound Cleansing Puracyn [place] 06/26/20 1035   Periwound Protectant Barrier Paste 06/26/20 1035   Dressing Cleansing/Solutions Not Applicable 06/26/20 1035   Dressing Options Puracyn Gel;Hydrofera Blue Ready;Absorbent Abdominal Pad;Compression Wrap Two Layer;Other (Comments) 06/26/20 1035   Dressing Changed New 06/26/20 1035   Dressing Status Clean;Dry;Intact 06/26/20 1035   Dressing Change/Treatment Frequency Every 48 hrs, and As Needed 06/26/20 1035   Non-staged Wound Description Full thickness 06/26/20 1035   Wound Length (cm) 10.1 cm 06/26/20 1035   Wound Width (cm) 3.9 cm 06/26/20 1035   Wound Depth (cm) 0.1 cm 06/26/20 1035   Wound Surface Area (cm^2) 39.39 cm^2 06/26/20 1035   Wound Volume (cm^3) 3.94 cm^3 06/26/20 1035   Post-Procedure Length (cm) 10.1 cm 06/26/20 1035   Post-Procedure Width (cm) 3.9 cm 06/26/20 1035   Post-Procedure Depth (cm) 0.2 cm 06/26/20 1035   Post-Procedure Surface Area (cm^2) 39.39 cm^2 06/26/20 1035   Post-Procedure Volume (cm^3) 7.88 cm^3 06/26/20 1035   Wound Healing % 20 06/26/20 1035   Wound Bed Granulation (%) 20 % 06/12/20 0945   Wound Bed Slough (%) 80 % 06/12/20 0945   Wound Bed Granulation (%) - Post-Procedure 60 % 06/12/20 0945   Wound Bed Slough % - (Post-Procedure) 40 % 06/12/20 0945   Tunneling (cm) 0 cm 06/26/20 1035   Undermining (cm) 0 cm 06/26/20 1035   Wound Odor None 06/26/20 1035   Pulses 2+;DP;Right 06/05/20 0930   Exposed  Structures None 06/26/20 1035       Wound 06/05/20 Full Thickness Wound Ankle Right lateral ankle (Active)   Wound Image    06/26/20 1035   Site Assessment Red;Pink;Yellow 06/26/20 1035   Periwound Assessment Edema;Fragile;Dry;Other (Comment) 06/26/20 1035   Margins Attached edges 06/26/20 1035   Closure Secondary intention 06/26/20 1035   Drainage Amount Small 06/26/20 1035   Drainage Description Serosanguineous 06/26/20 1035   Treatments Cleansed;Topical Lidocaine;Provider debridement 06/26/20 1035   Wound Cleansing Puracyn [place] 06/26/20 1035   Periwound Protectant Barrier Paste 06/26/20 1035   Dressing Cleansing/Solutions Not Applicable 06/26/20 1035   Dressing Options Puracyn Gel;Hydrofera Blue Ready;Absorbent Abdominal Pad;Compression Wrap Two Layer;Other (Comments) 06/26/20 1035   Dressing Changed New 06/26/20 1035   Dressing Status Clean;Dry;Intact 06/26/20 1035   Dressing Change/Treatment Frequency Every 48 hrs, and As Needed 06/26/20 1035   Non-staged Wound Description Full thickness 06/26/20 1035   Wound Length (cm) 0.3 cm 06/26/20 1035   Wound Width (cm) 0.2 cm 06/26/20 1035   Wound Depth (cm) 0.1 cm 06/19/20 1100   Wound Surface Area (cm^2) 0.06 cm^2 06/26/20 1035   Wound Volume (cm^3) 0.68 cm^3 06/19/20 1100   Post-Procedure Length (cm) 2.8 cm 06/26/20 1035   Post-Procedure Width (cm) 1.9 cm 06/26/20 1035   Post-Procedure Depth (cm) 0.1 cm 06/26/20 1035   Post-Procedure Surface Area (cm^2) 5.32 cm^2 06/26/20 1035   Post-Procedure Volume (cm^3) 0.53 cm^3 06/26/20 1035   Wound Healing % -127 06/19/20 1100   Wound Bed Granulation (%) 25 % 06/12/20 0945   Wound Bed Slough (%) 75 % 06/12/20 0945   Wound Bed Granulation (%) - Post-Procedure 35 % 06/12/20 0945   Wound Bed Slough % - (Post-Procedure) 65 % 06/12/20 0945   Tunneling (cm) 0 cm 06/26/20 1035   Undermining (cm) 0 cm 06/26/20 1035   Wound Odor None 06/26/20 1035   Pulses Bounding;Right;DP 06/05/20 0930   Exposed Structures None 06/26/20 1035        Wound 06/05/20 Full Thickness Wound Leg Right RLE anteriomedial (Active)   Wound Image   06/26/20 1035   Site Assessment Brown;Dry 06/26/20 1035   Periwound Assessment Edema;Fragile;Dry;Other (Comment) 06/26/20 1035   Margins Attached edges 06/12/20 0945   Closure Secondary intention 06/05/20 0930   Drainage Amount None 06/26/20 1035   Drainage Description Serosanguineous 06/12/20 0945   Treatments Cleansed 06/26/20 1035   Wound Cleansing Puracyn [place] 06/26/20 1035   Periwound Protectant Skin Moisturizer 06/19/20 1100   Dressing Cleansing/Solutions Normal Saline 06/05/20 0930   Dressing Options Compression Wrap Two Layer 06/26/20 1035   Dressing Changed Changed 06/26/20 1035   Dressing Status Clean;Dry;Intact 06/26/20 1035   Dressing Change/Treatment Frequency Every 48 hrs, and As Needed 06/26/20 1035   Non-staged Wound Description Full thickness 06/12/20 0945   Wound Length (cm) 2.2 cm 06/19/20 1100   Wound Width (cm) 1.1 cm 06/19/20 1100   Wound Depth (cm) 0.1 cm 06/12/20 0945   Wound Surface Area (cm^2) 2.42 cm^2 06/19/20 1100   Wound Volume (cm^3) 0.24 cm^3 06/12/20 0945   Post-Procedure Length (cm) 2.6 cm 06/12/20 0945   Post-Procedure Width (cm) 1.1 cm 06/12/20 0945   Post-Procedure Depth (cm) 0.1 cm 06/12/20 0945   Post-Procedure Surface Area (cm^2) 2.86 cm^2 06/12/20 0945   Post-Procedure Volume (cm^3) 0.29 cm^3 06/12/20 0945   Wound Healing % 73 06/12/20 0945   Wound Bed Granulation (%) 15 % 06/12/20 0945   Wound Bed Slough (%) 85 % 06/12/20 0945   Wound Bed Granulation (%) - Post-Procedure 60 % 06/12/20 0945   Wound Bed Slough % - (Post-Procedure) 40 % 06/12/20 0945   Tunneling (cm) 0 cm 06/19/20 1100   Undermining (cm) 0 cm 06/19/20 1100   Wound Odor None 06/26/20 1035   Pulses Right;2+;DP 06/05/20 0930   Exposed Structures None 06/26/20 1035                         PROCEDURE: Excisional debridement of left lower extremity wounds x 3  -2% viscous lidocaine applied topically to wound beds for  approximately 10 minutes prior to debridement  -Curette used to excise thick layer of slough from wound beds.  Excisional debridement was performed to remove devitalized tissue until healthy, bleeding tissue was visualized. Total area debrided 258.46 cm².  Tissue debrided into the subcutaneous layer.    -Bleeding controlled with manual pressure.    -Wounds cleansed with normal saline  -Wound culture collected, wound care completed by  RN, refer to flowsheet  -Patient tolerated the procedure well, without c/o pain or discomfort.     PROCEDURE: Excisional debridement of right lower extremity wounds x 4   -2% viscous lidocaine applied topically to wound beds for approximately 10 minutes prior to debridement  -Curette used to size slough from wound beds.  Excisional debridement was performed to remove devitalized tissue until healthy, bleeding tissue was visualized.  Total area debrided  44.71 cm².  Tissue debrided into the subcutaneous layer.    -Bleeding controlled with manual pressure.    -Wound care completed by wound RN, refer to flowsheet  -Patient tolerated the procedure well, without c/o pain or discomfort.      Pertinent Labs and Diagnostics:    Labs:     A1c:   Lab Results   Component Value Date/Time    HBA1C 5.7 (H) 01/23/2020 11:22 AM          VASCULAR STUDIES: BHUPINDER 12/27/2019   Right.    Doppler waveform of the common femoral artery is of high amplitude and    triphasic.    Doppler waveforms at the ankle are brisk and triphasic.    Ankle-brachial index is normal.       Left.    Could not perform ankle-brachial index due to large blisters/ulcers.   Normal toe-brachial index: 0.94   Doppler waveform of the common femoral artery is of high amplitude and    triphasic.    Biphasic waveforms seen at the ankle.     WOUND CULTURE:    DATE: 6/19/2020-culture collected in clinic        ASSESSMENT AND PLAN:     1. Skin ulcer of left lower leg with fat layer exposed (HCC)  Comment: Patient has history of recurring  ulcerations to left lower extremity.  Has undergone ablation procedures by Dr. White.  Discharged from [facility] in March 2020 due to healing, returned approximately 3 weeks later much worse.  Hospitalized from 4/16 until 5/21/2020 underwent multiple surgical debridements.    6/26/2020: Periwound of both legs intact.  Maceration appears to be under control.  However, thick layer of slough buildup on all the wound beds, and dressings saturated despite being changed 3 times per week.  Culture sensitivities still pending.  -Excisional debridement of wound in clinic today, medically necessary to promote wound healing.  -Wound culture positive for strep, staph, and Pseudomonas, sensitivities pending.  Augmentin prescribed and obtained for patient today.  Will adjust based on sensitivities.  -Patient to return to clinic weekly for assessment and debridement.  We will hopefully be able to restart VAC next clinic visit.  Patient has been instructed to bring VAC with him.  -Home health to change dressing 2 times per week in between clinic visits.. Try to get them to see him over the weekend.      Wound care: Adaptic over tendon, Puracyn to wound beds to manage Pseudomonas,Silver Hydrofiber to manage exudate and bioburden, foam cover dressing, Hypafix tape 2 layer compression wrap to manage edema    2. Skin ulcer of multiple sites of right lower extremity with fat layer exposed (HCC)  Comments: First observed in clinic on 6/5/2020, on initial evaluation. Patient is not certain when or how they occurred.  There is no documentation of wounds to this leg during recent hospitalization, so presumably they occurred after he went home.    6/26/2020:    Wound beds are done covered with thick layer of slough.   -Excisional debridement of wound in clinic today, medically necessary to promote wound healing.  t.    -See above              3. Chronic venous stasis  Comments: Hemosiderin staining, brawny edema, scarring- findings consistent  with CVI.  Pt established at [facility], underwent ablation procedures in 2019.     -Consider referral back to vascular if wounds failed to progress or deteriorate.    4. Acquired deformity of left ankle and foot  Comments: Charcot deformity of foot and ankle.  Very little ROM of ankle.  Patient was referred to orthopedics previously, however did not make appointment.    -Patient will need to follow-up with orthopedics once wounds are healed.       Patient was seen for 15 minutes face to face of which > 50% of appointment time was spent on counseling and coordination of care regarding the above.      PATIENT EDUCATION  - Pt advised to go to ER for any increased redness, swelling, drainage or odor, or if he develops fever, chills, nausea or vomiting.        Please note that this note may have been created using voice recognition software. I have worked with technical experts from [facility] to optimize the interface.  I have made every reasonable attempt to correct obvious errors, but there may be errors of grammar and possibly content that I did not discover before finalizing the note.    N

## 2020-06-29 ENCOUNTER — TELEPHONE (OUTPATIENT)
Dept: WOUND CARE | Facility: MEDICAL CENTER | Age: 65
End: 2020-06-29

## 2020-06-29 NOTE — TELEPHONE ENCOUNTER
Patient Name: Goran Chavez     Ordering Provider: Nancy Tapia lab staff called to inform provider of patient's positive culture results. Positive result reported out to Thomas Bills on 6/29/2020.

## 2020-07-01 DIAGNOSIS — T14.8XXA WOUND INFECTION: ICD-10-CM

## 2020-07-01 DIAGNOSIS — L08.9 WOUND INFECTION: ICD-10-CM

## 2020-07-01 RX ORDER — SULFAMETHOXAZOLE AND TRIMETHOPRIM 800; 160 MG/1; MG/1
1 TABLET ORAL 2 TIMES DAILY
Qty: 20 TAB | Refills: 0 | Status: SHIPPED | OUTPATIENT
Start: 2020-07-01 | End: 2020-07-11

## 2020-07-02 ENCOUNTER — OFFICE VISIT (OUTPATIENT)
Dept: WOUND CARE | Facility: MEDICAL CENTER | Age: 65
End: 2020-07-02
Attending: NURSE PRACTITIONER
Payer: MEDICARE

## 2020-07-02 VITALS
OXYGEN SATURATION: 98 % | RESPIRATION RATE: 16 BRPM | SYSTOLIC BLOOD PRESSURE: 123 MMHG | TEMPERATURE: 97.9 F | DIASTOLIC BLOOD PRESSURE: 80 MMHG | HEART RATE: 59 BPM

## 2020-07-02 DIAGNOSIS — M21.962 ACQUIRED DEFORMITY OF LEFT ANKLE AND FOOT: ICD-10-CM

## 2020-07-02 DIAGNOSIS — L08.9 WOUND INFECTION: ICD-10-CM

## 2020-07-02 DIAGNOSIS — L97.922 SKIN ULCER OF LEFT LOWER LEG WITH FAT LAYER EXPOSED (HCC): ICD-10-CM

## 2020-07-02 DIAGNOSIS — T14.8XXA WOUND INFECTION: ICD-10-CM

## 2020-07-02 DIAGNOSIS — I87.8 CHRONIC VENOUS STASIS: ICD-10-CM

## 2020-07-02 DIAGNOSIS — L97.912 SKIN ULCER OF MULTIPLE SITES OF RIGHT LOWER EXTREMITY WITH FAT LAYER EXPOSED (HCC): ICD-10-CM

## 2020-07-02 PROCEDURE — 11042 DBRDMT SUBQ TIS 1ST 20SQCM/<: CPT

## 2020-07-02 PROCEDURE — 11042 DBRDMT SUBQ TIS 1ST 20SQCM/<: CPT | Performed by: NURSE PRACTITIONER

## 2020-07-02 PROCEDURE — 11045 DBRDMT SUBQ TISS EACH ADDL: CPT | Performed by: NURSE PRACTITIONER

## 2020-07-02 PROCEDURE — 11045 DBRDMT SUBQ TISS EACH ADDL: CPT

## 2020-07-02 ASSESSMENT — ENCOUNTER SYMPTOMS
COUGH: 0
CONSTIPATION: 0
DEPRESSION: 1
FEVER: 0
NAUSEA: 0
SHORTNESS OF BREATH: 0
NERVOUS/ANXIOUS: 0
CHILLS: 0
MYALGIAS: 0
DIARRHEA: 0
VOMITING: 0

## 2020-07-02 NOTE — PROGRESS NOTES
Wound culture results reviewed, multiple organisms.  Rx for Bactrim DS sent to patient's pharmacy.  Patient already on Augmentin.  He will be seen in clinic on 7/2/2020.

## 2020-07-02 NOTE — LETTER
2020  To: [facility]  Re:Goran Schaeferkimberly,  1955    To LLE lateral wound:  Remove previous wraps and dressings  Cleanse wound with saline and pat dry  Apply no sting skin prep to sandrine wound  Apply vac drape to sandrine-wound. Ensure that vac drape covers any area where foam may come into contact with skin.   Cover wound bed with white foam. DO NOT ALLOW FOAM TO TOUCH INTACT SKIN  Cover white wound vac foam with vac drape.   Apply a black foam button to accommodate track pad if needed  Cut quarter sized hole into vac drape to accommodate trac pad.  Apply trac pad over hole in vac drape.   Connect vac tubing to wound vac machine.  Resume negative pressure wound therapy at 125 mmHg continuous.  Apply 2 layer compression wrap from base of toes to 2 inches below back of knee to LLE.     Wound Care Order: All other wounds    · Remove wraps and old dressings.   · Wash both legs with a washcloth and soapy water.  · Rinse wounds with normal saline or wound cleanser.  · Pat dry with gauze.  · Apply skin moisturizer to intact skin.  · Apply zinc barrier paste to periwound  · Cover exposed tendon with adaptic (vaselin moistened gauze)  · Apply Puracyn Gel (given to patient) to all wound beds.  · Apply fenestrated Hydrofera Blue ready to all wounds- Make sure the shiny side with the writing is NOT against the wound bed.  · Cover all wounds with abdominal pad or nonadhesive foam  · Apply 2 layer compression wrap to both legs.    Home health to see patient twice a week, patient to present to [facility] weekly.     __________________________________  Nancy ANGULO

## 2020-07-02 NOTE — PROGRESS NOTES
Home health orders faxed to [facility] #[phone]

## 2020-07-02 NOTE — PROGRESS NOTES
Provider Encounter- Full Thickness wound    HISTORY OF PRESENT ILLNESS  Wound History:    START OF CARE IN CLINIC: 5/20/2020               REFERRING PROVIDER: KEV Keith                 WOUND ETIOLOGY: venous,  likely mixed etiology              LOCATION: Left lower leg, multiple wounds                                   Right lower leg, multiple wounds-first observed in clinic on initial evaluation, 6/5/2020                HISTORY: Patient is very well-known to this clinic from previous treatment of wounds to his left lower extremity.  He was discharged from the clinic in March 2020 due to full resolution of his wound.  He returned to the clinic approximately 3 weeks later, on 4/16, with cellulitis, edema, and large open wounds to his left lower leg, and was sent directly to [facility] emergency room.  He was admitted for IV antibiotics and surgical intervention.  On 4/22/2020, he underwent an I&D of his left lower extremity, fasciotomy, and VAC placement.  In the following weeks he underwent surgery 3 more times for irrigation and debridement.  He was discharged home on 5/21, on the VAC, with home health and a referral to [facility].       Pertinent Medical History:  Anxiety, Charcot's joint of left foot, non-diabetic (3/21/2016), Chronic congestive heart failure (HCC) (11/16/2017), Hepatitis C, chronic (HCC), Hypertension, Migraine, Polysubstance abuse (HCC) (3/8/2018), Tobacco use (4/18/2016), Ulcer of left lower extremity with necrosis of muscle (HCC) (3/21/2016), and Venous stasis ulcer ([facility]) (2017).                ETIOLOGY HISTORY:  Vascular Surgeon: Dr. White. Compression Circ-aid. Varicose Veins none visible     TOBACCO USE: Patient denies; patient also denies alcohol and recreational drug use    Patient's problem list, allergies, and current medications reviewed and updated in Epic    Interval History:  6/5/2020: Initial clinic visit with KEV Brantley.  Patient returns to clinic after a  "long hospitalization.  The VAC was held by home health due to severe maceration and deterioration of wound beds.  He also presents today with new wounds to his right lower extremity.  He states he does not know how or when these occurred, states, \"they just popped up\".  He is uncertain whether or not he has follow-up appointments with his surgeon.  He does not know what medications he is on.      6/12/2020 : Clinic visit with KEV Brantley.  Goran states he is feeling well today,denies fevers, chills, nausea, vomiting, cough or shortness of breath.  Condition of his periwound skin is significantly improved.  I would like to restart VAC, however he did not bring the VAC with him, and that has already been on for 2-1/2 weeks.  Will likely need to be reordered.  [facility] continues to see him several times per week for dressing changes.  He is tolerating compression without any difficulty.    6/19/2020 : Clinic visit with KEV Brantley.   Goran states he is feeling well today,denies fevers, chills, nausea, vomiting, cough or shortness of breath.  He brought his VAC with him today.  However,  too much slough on wound beds to consider re-applying.  [facility] continues to see him 2 times per week.  I have asked him to bring the VAC with him again next week, and will hopefully place it at that time.  Per patient, dressings are saturated between dressing changes.    6/26/2020 : Clinic visit with KEV Brantley.   Goran states he is feeling well today,denies fevers, chills, nausea, vomiting, cough or shortness of breath.  He brought his VAC with him again today, however we are not able to put it on as he is still having twice drainage.  Sensitivities from culture still pending.  However I did start him on Augmentin today which we are able to obtain from [facility] prior to his leaving the clinic today and had him take one before he left.  [facility] is been seen 2 times per week in between clinic visits.  " We will try and get them to see him over the weekend to do an additional dressing change.  Will adjust antibiotics once sensitivities are known.  Hopefully start VAC next clinic visit.    7/2/2020 : Clinic visit with KEV Brantley.    Goran states he is feeling well today,denies fevers, chills, nausea, vomiting, cough or shortness of breath.  His VAC is here in the clinic, however he does not have another home health appointment until Monday, 4 days from now.  He has been taking Augmentin as prescribed last week.  I informed him that I added Bactrim DS after I got sensitivity results, and that it should be waiting for him at his pharmacy.      REVIEW OF SYSTEMS:   Review of Systems   Constitutional: Negative for chills and fever.        States he is eating well, appetite normal   Respiratory: Negative for cough and shortness of breath.    Cardiovascular: Positive for leg swelling. Negative for chest pain.        Chronic swelling in legs, for several years   Gastrointestinal: Negative for constipation, diarrhea, nausea and vomiting.   Genitourinary: Negative for dysuria.   Musculoskeletal: Positive for joint pain. Negative for myalgias.        Chronic   Skin:        New wounds to right lower leg, patient does not know how or when they started  Hospitalized in April and May 2020 for left lower leg wound, underwent several surgeries   Psychiatric/Behavioral: Positive for depression. The patient is not nervous/anxious.         States he is a little bit depressed about his legs, denies wanting to harm himself       PHYSICAL EXAMINATION:   /80   Pulse (!) 59   Temp 36.6 °C (97.9 °F) (Temporal)   Resp 16   SpO2 98%     Physical Exam   Constitutional: He is oriented to person, place, and time and well-developed, well-nourished, and in no distress.   Disheveled unkept   HENT:   Head: Normocephalic and atraumatic.   Eyes: Pupils are equal, round, and reactive to light. Conjunctivae and EOM are normal.   Neck:  Neck supple.   Cardiovascular: Intact distal pulses.   Pulmonary/Chest: Effort normal.   Musculoskeletal: Normal range of motion.         General: Deformity and edema present.      Comments: Left charcot foot and ankle  3+pitting edema to bilateral LE   Neurological: He is alert and oriented to person, place, and time.   Skin: Skin is warm. There is erythema.   Large full-thickness wounds to bilateral lower extremities  Further wound flowsheet and photos   Psychiatric: Affect normal.   Patient lacks insight into his own healthcare issues, defers to his daughter to keep track of his medications and appointments       WOUND ASSESSMENT      Wound 06/05/20 Full Thickness Wound Leg Left LLE anteriolateral (Active)   Wound Image    07/02/20 1013   Site Assessment Red;Yellow 07/02/20 1013   Periwound Assessment Edema;Fragile 07/02/20 1013   Margins Attached edges 07/02/20 1013   Closure Secondary intention 06/26/20 1035   Drainage Amount Copious 07/02/20 1013   Drainage Description Serosanguineous 07/02/20 1013   Treatments Cleansed;Topical Lidocaine;Provider debridement 07/02/20 1013   Wound Cleansing Foam Cleanser/Washcloth 07/02/20 1013   Periwound Protectant Barrier Paste;Skin Moisturizer;TAC Ointment 07/02/20 1013   Dressing Cleansing/Solutions Not Applicable 06/26/20 1035   Dressing Options Hydrofera Blue Ready;Absorbent Abdominal Pad;Compression Wrap Two Layer 07/02/20 1013   Dressing Changed New 06/26/20 1035   Dressing Status Clean;Dry;Intact 06/26/20 1035   Dressing Change/Treatment Frequency Every 48 hrs, and As Needed 06/26/20 1035   Non-staged Wound Description Full thickness 07/02/20 1013   Wound Length (cm) 19.5 cm 07/02/20 1013   Wound Width (cm) 7 cm 07/02/20 1013   Wound Depth (cm) 0.3 cm 06/12/20 0945   Wound Surface Area (cm^2) 136.5 cm^2 07/02/20 1013   Wound Volume (cm^3) 86.1 cm^3 06/12/20 0945   Post-Procedure Length (cm) 19.6 cm 07/02/20 1013   Post-Procedure Width (cm) 7 cm 07/02/20 1013    Post-Procedure Depth (cm) 0.4 cm 06/26/20 1035   Post-Procedure Surface Area (cm^2) 137.2 cm^2 07/02/20 1013   Post-Procedure Volume (cm^3) 62.4 cm^3 06/26/20 1035   Wound Healing % -58 06/12/20 0945   Wound Bed Granulation (%) 75 % 06/12/20 0945   Wound Bed Slough (%) 40 % 07/02/20 1013   Wound Bed Granulation (%) - Post-Procedure 100 % 06/12/20 0945   Wound Bed Slough % - (Post-Procedure) 5 % 06/05/20 0930   Tunneling (cm) 0 cm 06/26/20 1035   Undermining (cm) 0 cm 06/26/20 1035   Wound Odor None 07/02/20 1013   Pulses Left;2+;DP 06/05/20 0930   Exposed Structures None 07/02/20 1013       Wound 06/05/20 Full Thickness Wound Ankle LLE distal posteriomedial (Active)   Wound Image    07/02/20 1013   Site Assessment Red;Yellow 07/02/20 1013   Periwound Assessment Edema;Fragile 07/02/20 1013   Margins Attached edges 07/02/20 1013   Closure Secondary intention 06/26/20 1035   Drainage Amount Large 07/02/20 1013   Drainage Description Serosanguineous 07/02/20 1013   Treatments Cleansed;Topical Lidocaine;Provider debridement 07/02/20 1013   Wound Cleansing Foam Cleanser/Washcloth 07/02/20 1013   Periwound Protectant Barrier Paste;Skin Moisturizer;TAC Ointment 07/02/20 1013   Dressing Cleansing/Solutions Not Applicable 06/26/20 1035   Dressing Options Hydrofera Blue Ready;Absorbent Abdominal Pad;Compression Wrap Two Layer 07/02/20 1013   Dressing Changed New 06/26/20 1035   Dressing Status Clean;Dry;Intact 06/26/20 1035   Dressing Change/Treatment Frequency Every 48 hrs, and As Needed 06/26/20 1035   Non-staged Wound Description Full thickness 07/02/20 1013   Wound Length (cm) 2.5 cm 07/02/20 1013   Wound Width (cm) 6 cm 07/02/20 1013   Wound Depth (cm) 0.3 cm 07/02/20 1013   Wound Surface Area (cm^2) 15 cm^2 07/02/20 1013   Wound Volume (cm^3) 4.5 cm^3 07/02/20 1013   Post-Procedure Length (cm) 2.6 cm 07/02/20 1013   Post-Procedure Width (cm) 6.1 cm 07/02/20 1013   Post-Procedure Depth (cm) 0.3 cm 07/02/20 1013    Post-Procedure Surface Area (cm^2) 15.86 cm^2 07/02/20 1013   Post-Procedure Volume (cm^3) 4.76 cm^3 07/02/20 1013   Wound Healing % 23 07/02/20 1013   Wound Bed Granulation (%) 65 % 06/12/20 0945   Wound Bed Slough (%) 25 % 07/02/20 1013   Wound Bed Granulation (%) - Post-Procedure 80 % 06/12/20 0945   Wound Bed Slough % - (Post-Procedure) 20 % 06/12/20 0945   Tunneling (cm) 0 cm 06/26/20 1035   Undermining (cm) 0 cm 06/26/20 1035   Wound Odor None 07/02/20 1013   Pulses Left;2+;DP 06/05/20 0930   Exposed Structures None 07/02/20 1013       Wound 06/05/20 Full Thickness Wound Leg Left LLE posteriomedial (Active)   Wound Image    07/02/20 1013   Site Assessment Red;Yellow 07/02/20 1013   Periwound Assessment Edema;Fragile 07/02/20 1013   Margins Attached edges 07/02/20 1013   Closure Secondary intention 06/26/20 1035   Drainage Amount Large 07/02/20 1013   Drainage Description Serosanguineous 07/02/20 1013   Treatments Cleansed;Topical Lidocaine;Provider debridement 07/02/20 1013   Wound Cleansing Foam Cleanser/Washcloth 07/02/20 1013   Periwound Protectant Barrier Paste;Skin Moisturizer;TAC Ointment 07/02/20 1013   Dressing Cleansing/Solutions Not Applicable 06/26/20 1035   Dressing Options Hydrofera Blue Ready;Absorbent Abdominal Pad;Compression Wrap Two Layer 07/02/20 1013   Dressing Changed New 06/26/20 1035   Dressing Status Clean;Dry;Intact 06/26/20 1035   Dressing Change/Treatment Frequency Every 48 hrs, and As Needed 06/26/20 1035   Non-staged Wound Description Full thickness 07/02/20 1013   Wound Length (cm) 11 cm 07/02/20 1013   Wound Width (cm) 9.5 cm 07/02/20 1013   Wound Depth (cm) 0.2 cm 07/02/20 1013   Wound Surface Area (cm^2) 104.5 cm^2 07/02/20 1013   Wound Volume (cm^3) 20.9 cm^3 07/02/20 1013   Post-Procedure Length (cm) 11 cm 07/02/20 1013   Post-Procedure Width (cm) 10.5 cm 07/02/20 1013   Post-Procedure Depth (cm) 0.2 cm 07/02/20 1013   Post-Procedure Surface Area (cm^2) 115.5 cm^2 07/02/20  1013   Post-Procedure Volume (cm^3) 23.1 cm^3 07/02/20 1013   Wound Healing % -176 07/02/20 1013   Wound Bed Granulation (%) 40 % 06/12/20 0945   Wound Bed Slough (%) 40 % 07/02/20 1013   Wound Bed Granulation (%) - Post-Procedure 80 % 06/12/20 0945   Wound Bed Slough % - (Post-Procedure) 20 % 06/12/20 0945   Tunneling (cm) 0 cm 06/26/20 1035   Undermining (cm) 0 cm 06/26/20 1035   Wound Odor None 07/02/20 1013   Pulses 2+;Left;DP 06/05/20 0930   Exposed Structures None 07/02/20 1013       Wound 06/05/20 Full Thickness Wound Leg Right R anterolateral LE (Active)   Wound Image    07/02/20 1013   Site Assessment Red;Yellow 07/02/20 1013   Periwound Assessment Edema;Fragile 07/02/20 1013   Margins Attached edges 07/02/20 1013   Closure Secondary intention 06/26/20 1035   Drainage Amount Moderate 07/02/20 1013   Drainage Description Serosanguineous 07/02/20 1013   Treatments Cleansed;Topical Lidocaine;Provider debridement 07/02/20 1013   Wound Cleansing Foam Cleanser/Washcloth 07/02/20 1013   Periwound Protectant Barrier Paste;Skin Moisturizer;TAC Ointment 07/02/20 1013   Dressing Cleansing/Solutions Not Applicable 06/26/20 1035   Dressing Options Hydrofera Blue Ready;Absorbent Abdominal Pad;Compression Wrap Two Layer 07/02/20 1013   Dressing Changed New 06/26/20 1035   Dressing Status Clean;Dry;Intact 06/26/20 1035   Dressing Change/Treatment Frequency Every 48 hrs, and As Needed 06/26/20 1035   Non-staged Wound Description Full thickness 07/02/20 1013   Wound Length (cm) 7.5 cm 07/02/20 1013   Wound Width (cm) 3.8 cm 07/02/20 1013   Wound Depth (cm) 0.1 cm 07/02/20 1013   Wound Surface Area (cm^2) 28.5 cm^2 07/02/20 1013   Wound Volume (cm^3) 2.85 cm^3 07/02/20 1013   Post-Procedure Length (cm) 8 cm 07/02/20 1013   Post-Procedure Width (cm) 3.8 cm 07/02/20 1013   Post-Procedure Depth (cm) 0.1 cm 07/02/20 1013   Post-Procedure Surface Area (cm^2) 30.4 cm^2 07/02/20 1013   Post-Procedure Volume (cm^3) 3.04 cm^3 07/02/20  1013   Wound Healing % 42 07/02/20 1013   Wound Bed Granulation (%) 20 % 06/12/20 0945   Wound Bed Slough (%) 80 % 06/12/20 0945   Wound Bed Granulation (%) - Post-Procedure 60 % 06/12/20 0945   Wound Bed Slough % - (Post-Procedure) 40 % 06/12/20 0945   Tunneling (cm) 0 cm 06/26/20 1035   Undermining (cm) 0 cm 06/26/20 1035   Wound Odor None 07/02/20 1013   Pulses 2+;DP;Right 06/05/20 0930   Exposed Structures None 07/02/20 1013       Wound 06/05/20 Full Thickness Wound Ankle Right lateral ankle (Active)   Wound Image   07/02/20 1013   Site Assessment Yellow 07/02/20 1013   Periwound Assessment Edema;Fragile 07/02/20 1013   Margins Attached edges 07/02/20 1013   Closure Secondary intention 06/26/20 1035   Drainage Amount Small 07/02/20 1013   Drainage Description Serosanguineous 07/02/20 1013   Treatments Cleansed;Topical Lidocaine;Site care 07/02/20 1013   Wound Cleansing Foam Cleanser/Washcloth 07/02/20 1013   Periwound Protectant Barrier Paste;Skin Moisturizer 07/02/20 1013   Dressing Cleansing/Solutions Not Applicable 06/26/20 1035   Dressing Options Other (Comments);Compression Wrap Two Layer 07/02/20 1013   Dressing Changed New 06/26/20 1035   Dressing Status Clean;Dry;Intact 06/26/20 1035   Dressing Change/Treatment Frequency Every 48 hrs, and As Needed 06/26/20 1035   Non-staged Wound Description Full thickness 07/02/20 1013   Wound Length (cm) 0.5 cm 07/02/20 1013   Wound Width (cm) 0.3 cm 07/02/20 1013   Wound Depth (cm) 0.1 cm 07/02/20 1013   Wound Surface Area (cm^2) 0.15 cm^2 07/02/20 1013   Wound Volume (cm^3) 0.02 cm^3 07/02/20 1013   Post-Procedure Length (cm) 2.8 cm 06/26/20 1035   Post-Procedure Width (cm) 1.9 cm 06/26/20 1035   Post-Procedure Depth (cm) 0.1 cm 06/26/20 1035   Post-Procedure Surface Area (cm^2) 5.32 cm^2 06/26/20 1035   Post-Procedure Volume (cm^3) 0.53 cm^3 06/26/20 1035   Wound Healing % 93 07/02/20 1013   Wound Bed Granulation (%) 25 % 06/12/20 0945   Wound Bed Slough (%) 100 %  07/02/20 1013   Wound Bed Granulation (%) - Post-Procedure 35 % 06/12/20 0945   Wound Bed Slough % - (Post-Procedure) 65 % 06/12/20 0945   Tunneling (cm) 0 cm 06/26/20 1035   Undermining (cm) 0 cm 06/26/20 1035   Wound Odor None 07/02/20 1013   Pulses Bounding;Right;DP 06/05/20 0930   Exposed Structures None 07/02/20 1013        PROCEDURE: Excisional debridement of left lower extremity wounds x 3  -2% viscous lidocaine applied topically to wound beds for approximately 10 minutes prior to debridement  -Curette used to excise thick layer of slough from wound beds.  Excisional debridement was performed to remove devitalized tissue until healthy, bleeding tissue was visualized. Total area debrided 268.56 cm².  Tissue debrided into the subcutaneous layer.    -Bleeding controlled with manual pressure.    -Wounds cleansed with normal saline  -Wound culture collected, wound care completed by  RN, refer to flowsheet  -Patient tolerated the procedure well, without c/o pain or discomfort.     PROCEDURE: Excisional debridement of right lower extremity wounds x 4   -2% viscous lidocaine applied topically to wound beds for approximately 10 minutes prior to debridement  -Curette used to size slough from wound beds.  Excisional debridement was performed to remove devitalized tissue until healthy, bleeding tissue was visualized.  Total area debrided  30.4 cm².  Tissue debrided into the subcutaneous layer.    -Bleeding controlled with manual pressure.    -Wound care completed by wound RN, refer to flowsheet  -Patient tolerated the procedure well, without c/o pain or discomfort.      Pertinent Labs and Diagnostics:    Labs:     A1c:   Lab Results   Component Value Date/Time    HBA1C 5.7 (H) 01/23/2020 11:22 AM          VASCULAR STUDIES: BHUPINDER 12/27/2019   Right.    Doppler waveform of the common femoral artery is of high amplitude and    triphasic.    Doppler waveforms at the ankle are brisk and triphasic.    Ankle-brachial index is  normal.       Left.    Could not perform ankle-brachial index due to large blisters/ulcers.   Normal toe-brachial index: 0.94   Doppler waveform of the common femoral artery is of high amplitude and    triphasic.    Biphasic waveforms seen at the ankle.     WOUND CULTURE:    DATE: 6/19/2020-culture collected in clinic  Culture Result  Abnormal     Beta Hemolytic Streptococcus group A   Heavy growth     Culture Result  Abnormal     Staphylococcus aureus   Moderate growth     Culture Result  Abnormal     Serratia marcescens   Moderate growth     Culture Result  Abnormal     Proteus mirabilis   Moderate growth     Culture Result  Abnormal     Pseudomonas species   Light growth   unable to isolate for sensitivity            ASSESSMENT AND PLAN:     1. Skin ulcer of left lower leg with fat layer exposed (HCC)  Comment: Patient has history of recurring ulcerations to left lower extremity.  Has undergone ablation procedures by Dr. White.  Discharged from [facility] in March 2020 due to healing, returned approximately 3 weeks later much worse.  Hospitalized from 4/16 until 5/21/2020 underwent multiple surgical debridements.    7/2/2020: Periwound of both legs intact maceration resolved.  However, thick layer of slough buildup on all the wound beds again, and hyper granulation of left lateral wound.  -Excisional debridement of wound in clinic today, medically necessary to promote wound healing.  -Wound culture positive for strep, staph, and Pseudomonas, sensitivities pending.  Augmentin prescribed and obtained for patient today.  Will adjust based on sensitivities.  -Patient to return to clinic weekly for assessment and debridement.  VAC is here in clinic, however he does not have an appoint with home health for 4 more days.  -Home health to restart VAC on Monday 1/6.  -Once VAC restarted, home health to change VAC dressings on Monday Wednesday, patient will be seen for debridement and VAC change in clinic on Fridays.  -Patient  was prescribed Augmentin last week while waiting for sensitivities to be done.  Sensitivities positive for 5 different organisms.  Bactrim DS ordered, referral back to ID.    Wound care: Adaptic over tendon, Puracyn to wound beds to manage Pseudomonas,Silver Hydrofiber to manage exudate and bioburden, foam cover dressing, Hypafix tape 2 layer compression wrap to manage edema    2. Skin ulcer of multiple sites of right lower extremity with fat layer exposed (HCC)  Comments: First observed in clinic on 6/5/2020, on initial evaluation. Patient is not certain when or how they occurred.  There is no documentation of wounds to this leg during recent hospitalization, so presumably they occurred after he went home.    7/2/2020    wound beds are again covered with thick layer of slough.   -Excisional debridement of wound in clinic today, medically necessary to promote wound healing.  t.    -See above              3. Chronic venous stasis  Comments: Hemosiderin staining, brawny edema, scarring- findings consistent with CVI.  Pt [facility] at [facility], underwent ablation procedures in 2019.     -Consider referral back to vascular if wounds failed to progress or deteriorate.    4. Acquired deformity of left ankle and foot  Comments: Charcot deformity of foot and ankle.  Very little ROM of ankle.  Patient was referred to orthopedics previously, however did not make appointment.    -Patient will need to follow-up with orthopedics once wounds are healed.       Patient was seen for 15 minutes face to face of which > 50% of appointment time was spent on counseling and coordination of care regarding the above.      PATIENT EDUCATION  - Pt advised to go to ER for any increased redness, swelling, drainage or odor, or if he develops fever, chills, nausea or vomiting.        Please note that this note may have been created using voice recognition software. I have worked with technical experts from [employer] to optimize the  interface.  I have made every reasonable attempt to correct obvious errors, but there may be errors of grammar and possibly content that I did not discover before finalizing the note.    N

## 2020-07-06 ENCOUNTER — OFFICE VISIT (OUTPATIENT)
Dept: MEDICAL GROUP | Facility: MEDICAL CENTER | Age: 65
End: 2020-07-06
Attending: FAMILY MEDICINE
Payer: MEDICARE

## 2020-07-06 VITALS
OXYGEN SATURATION: 97 % | SYSTOLIC BLOOD PRESSURE: 118 MMHG | HEART RATE: 72 BPM | HEIGHT: 72 IN | DIASTOLIC BLOOD PRESSURE: 60 MMHG | BODY MASS INDEX: 22.21 KG/M2 | TEMPERATURE: 98.6 F | WEIGHT: 164 LBS | RESPIRATION RATE: 16 BRPM

## 2020-07-06 DIAGNOSIS — I87.2 VENOUS STASIS DERMATITIS OF BOTH LOWER EXTREMITIES: ICD-10-CM

## 2020-07-06 PROCEDURE — 99213 OFFICE O/P EST LOW 20 MIN: CPT | Performed by: FAMILY MEDICINE

## 2020-07-06 RX ORDER — OXYCODONE HYDROCHLORIDE 5 MG/1
5 TABLET ORAL EVERY 8 HOURS PRN
Qty: 75 TAB | Refills: 0 | Status: SHIPPED | OUTPATIENT
Start: 2020-07-06 | End: 2020-08-05

## 2020-07-06 ASSESSMENT — FIBROSIS 4 INDEX: FIB4 SCORE: 1.48

## 2020-07-06 NOTE — PROGRESS NOTES
Subjective:      Goran Chavez is a 65 y.o. male who presents with Anxiety            HPI 1.  Venous stasis dermatitis-patient continues in wound care.  He is now approximately 2 weeks post hospital discharge.  He is also followed by infectious disease.  He has been cultured and found to have a polymicrobial infection that has included Pseudomonas in the past.  Currently appears to be taking Bactrim DS based on reviewing his medication list.  The consensus is that his numerous extensive lower extremity venous ulcers are slowly closing.  There has been intermittent use of a wound VAC.  She reports pain worse on some days.  He does get relief using the 2 oxycodone 5 mg doses daily and wishes he had a little bit more.    ROS negative for current fever, constipation, abdominal pain       Objective:     /60 (BP Location: Left arm, Patient Position: Sitting, BP Cuff Size: Adult)   Pulse 72   Temp 37 °C (98.6 °F) (Temporal)   Resp 16   Ht 1.829 m (6')   Wt 74.4 kg (164 lb)   SpO2 97%   BMI 22.24 kg/m²      Physical Exam  Gen.- alert, cooperative, in no acute distress  Neck- midline trachea, thyroid not enlarged or tender,supple, no cervical adenopathy  Chest-clear to auscultation and percussion with normal breath sounds. No retractions. Chest wall nontender  Cardiac- regular rhythm and rate. No murmur, thrill, or heave  Left lower extremity-images from wound care visit on 7/1/2020 are reviewed which still show extensive multiple shallow venous ulcers below the left knee and above the left ankle.  Both legs are dressed today and wounds were not visualized          Assessment/Plan:       1. Venous stasis dermatitis of both lower extremities  Oxycodone 5 mg  - oxyCODONE immediate-release (ROXICODONE) 5 MG Tab; Take 1 Tab by mouth every 8 hours as needed for Severe Pain for up to 30 days.  Dispense: 75 Tab; Refill: 0  Plan: 1.  Increase oxycodone 5 mg to 75 in the next month to allow some days for 3 doses  per day other days to continue with 2 doses per day to control pain and continue daily activities  2.  Continue with that wound care and infectious disease as scheduled  3.  Revisit in 1 month

## 2020-07-07 ENCOUNTER — OFFICE VISIT (OUTPATIENT)
Dept: INFECTIOUS DISEASES | Facility: MEDICAL CENTER | Age: 65
End: 2020-07-07
Payer: MEDICARE

## 2020-07-07 VITALS
HEART RATE: 78 BPM | OXYGEN SATURATION: 95 % | WEIGHT: 164 LBS | BODY MASS INDEX: 22.21 KG/M2 | TEMPERATURE: 98.8 F | DIASTOLIC BLOOD PRESSURE: 60 MMHG | SYSTOLIC BLOOD PRESSURE: 94 MMHG | HEIGHT: 72 IN

## 2020-07-07 DIAGNOSIS — L97.929 VENOUS STASIS ULCER OF LEFT LOWER EXTREMITY (HCC): ICD-10-CM

## 2020-07-07 DIAGNOSIS — A49.8 SERRATIA INFECTION: ICD-10-CM

## 2020-07-07 DIAGNOSIS — L08.9 INFECTED SKIN ULCER WITH FAT LAYER EXPOSED (HCC): ICD-10-CM

## 2020-07-07 DIAGNOSIS — A49.8 PROTEUS INFECTION: ICD-10-CM

## 2020-07-07 DIAGNOSIS — L98.492 INFECTED SKIN ULCER WITH FAT LAYER EXPOSED (HCC): ICD-10-CM

## 2020-07-07 DIAGNOSIS — A49.01 MSSA (METHICILLIN SUSCEPTIBLE STAPHYLOCOCCUS AUREUS) INFECTION: ICD-10-CM

## 2020-07-07 DIAGNOSIS — B95.0 GROUP A STREPTOCOCCAL INFECTION: ICD-10-CM

## 2020-07-07 DIAGNOSIS — A49.8 PSEUDOMONAS INFECTION: ICD-10-CM

## 2020-07-07 DIAGNOSIS — I83.029 VENOUS STASIS ULCER OF LEFT LOWER EXTREMITY (HCC): ICD-10-CM

## 2020-07-07 PROCEDURE — 99214 OFFICE O/P EST MOD 30 MIN: CPT | Performed by: NURSE PRACTITIONER

## 2020-07-07 RX ORDER — AMOXICILLIN AND CLAVULANATE POTASSIUM 875; 125 MG/1; MG/1
TABLET, FILM COATED ORAL
COMMUNITY
Start: 2020-06-26 | End: 2020-08-04 | Stop reason: SDUPTHER

## 2020-07-07 ASSESSMENT — FIBROSIS 4 INDEX: FIB4 SCORE: 1.48

## 2020-07-07 NOTE — PROGRESS NOTES
Infectious Disease Clinic    Subjective:     Chief Complaint   Patient presents with   • Follow-Up     Infected Skin Ulcer, BLE      Interval History: 65-year-old male with a PMH of HCV, tobacco abuse, polysubstance abuse, and left Charcot foot in a nondiabetic with a chronic LLE ulcer.  Well known to ID for previous treatment of LLE infected skin ulcer.  Last seen by ID in April/May 2020 for LLE cellulitis and chronic non healing wound +MRSA, GAS & PSAR, treated with PO Zyvox and IV Meropenem Inpt.  Underwent LLE I&D on 5/4, skin graft canceled d/t tendon exposure.  Discharged home on no abx d/t no s/sx of infection.        Being cared for at [facility].  Culture of RLE on 6/19 + GAS, MSSA, Serratia, Proteus and Pseudomonas.  Placed on p.o. Augmentin and Bactrim by KEV Brantley and sent back to ID for review.    Records reviewed    Today, 7/7/2020: Accompanied by his daughter, Megan.  Returns to ID due to infected lower extremity skin ulcerations.  Wound cultures as noted above.  Patient states that he has been tolerating the p.o. Augmentin and Bactrim without issue.  Denies feeling generally ill, fevers/chills, general malaise, headache, n/v/d, abdominal pain, chest pain or shortness of breath.  Continues to be seen at the wound care clinic 3 times a week, wound VAC was reapplied to LLE today.  He has a compression wrap to the RLE.  He states that the wounds are slowly improving and getting smaller, there is granulation, no mild yellowish-pink drainage without any odor.  States that he has chronic LLE pain that averages 6/10 described as a throbbing sensation that is improved with pain medication.  He also gets intermittent pain to the RLE wound sites.    Review of Systems   Constitutional: Negative for chills, fever and malaise/fatigue.   HENT: Negative for congestion and sore throat.    Respiratory: Negative for cough and shortness of breath.    Cardiovascular: Negative for chest pain.    Gastrointestinal: Negative for abdominal pain, constipation, diarrhea, nausea and vomiting.   Genitourinary: Negative for dysuria.   Musculoskeletal: Positive for myalgias. Negative for joint pain.   Skin: Positive for itching (Just above BLE wraps.). Negative for rash.   Neurological: Positive for weakness (Generalized to BLE). Negative for headaches.   Psychiatric/Behavioral: The patient is not nervous/anxious.        Past Medical History:   Diagnosis Date   • Anxiety    • Charcot's joint of left foot, non-diabetic 3/21/2016   • Hepatitis C, chronic (HCC)    • Hypertension    • Kidney disease    • Migraine    • Polysubstance abuse (HCC) 3/8/2018   • Tobacco use 2016   • Ulcer of left lower extremity with necrosis of muscle (HCC) 3/21/2016   • Venous stasis ulcer (HCC) 2017    bilateral lower extremity       Family History   Problem Relation Age of Onset   • Lung Disease Mother 70         from COPD   • Hypertension Mother    • Other Father 82         from brain aneurysm after fall   • Cancer Neg Hx    • Heart Disease Neg Hx    • Stroke Neg Hx    • Diabetes Neg Hx        Social History     Tobacco Use   • Smoking status: Former Smoker     Packs/day: 0.00     Years: 48.00     Pack years: 0.00     Types: Cigarettes     Last attempt to quit: 2018     Years since quittin.5   • Smokeless tobacco: Never Used   • Tobacco comment: states he is using 7mg nicotine patch   Substance Use Topics   • Alcohol use: No     Alcohol/week: 7.2 oz     Types: 12 Cans of beer per week     Comment: history of alcoholism    • Drug use: Yes     Types: Marijuana, Inhaled, Methamphetamines     Comment: MJ every day, intermittent meth use       Allergies: Norco [hydrocodone-acetaminophen]    Pt's medication and problem list reviewed.     Objective:     BP (!) 94/60 (BP Location: Right arm, Patient Position: Sitting)   Pulse 78   Temp 37.1 °C (98.8 °F) (Temporal)   Ht 1.829 m (6') Comment: patient reported  Wt 74.4 kg  (164 lb) Comment: patient reported  SpO2 95%   BMI 22.24 kg/m²     Physical Exam   Constitutional: He is oriented to person, place, and time and well-developed, well-nourished, and in no distress. No distress.   HENT:   Head: Normocephalic and atraumatic.   Right Ear: External ear normal.   Left Ear: External ear normal.   Eyes: Pupils are equal, round, and reactive to light. Conjunctivae and EOM are normal. No scleral icterus.   Neck: Normal range of motion. Neck supple. No JVD present. No tracheal deviation present.   Cardiovascular: Normal rate, regular rhythm and normal heart sounds.   No murmur heard.  Pulmonary/Chest: Effort normal and breath sounds normal. No respiratory distress. He has no wheezes. He has no rales.   Abdominal: Soft. Bowel sounds are normal. He exhibits no distension. There is no abdominal tenderness. There is no rebound and no guarding.   Musculoskeletal:         General: Edema (Trace LLE) present. No tenderness.      Comments: Dressings in place to BLE with W[place] to LLE, pictures taken at wound care on 7/2/2020 reviewed:  RLE, anterior medial-8 x 3.8 x 0.1 cm, wound bed pink/red, no maceration to wound edges, trace erythema along wound edges, diffuse skin peeling with scabbing to RLE.  LLE, anterior lateral-19.6 x 7 x 0.2 cm, wound bed pink/red with hyper granulated tissue, no maceration along wound edges, no erythema to surrounding tissue, mild amount of serosanguineous drainage.  LLE, posterior medial-11 x 10.5 x 0.2 cm, wound bed pink with hyper granulated tissue, no maceration to wound edges, trace erythema along wound edges.  LLE, distal posterior medial-2.6 x 6.1 x 0.3 cm, wound bed pink with hyper granulated tissue, no maceration along wound edges, trace erythema to surrounding tissue.   Neurological: He is alert and oriented to person, place, and time. No cranial nerve deficit.   Shuffling gait   Skin: Skin is warm and dry. No rash noted. He is not diaphoretic. There is erythema.    Psychiatric: Mood, memory, affect and judgment normal.   Vitals reviewed.    Microbiology:  06/19/20   Source WND  Site RIGHT LEG   Culture Result  Abnormal     Beta Hemolytic Streptococcus group A   Heavy growth     Culture Result  Abnormal     Staphylococcus aureus   Moderate growth     Culture Result  Abnormal     Serratia marcescens   Moderate growth     Culture Result  Abnormal     Proteus mirabilis   Moderate growth     Culture Result  Abnormal     Pseudomonas species   Light growth   unable to isolate for sensitivity     Resulting Agency  M    Susceptibility      Staphylococcus aureus  Serratia marcescens      SHANON  SHANON      Ampicillin    >16 mcg/mL  Resistant      Ampicillin/sulbactam  <=8/4 mcg/mL  Sensitive        Azithromycin  <=2 mcg/mL  Sensitive        Cefazolin  <=8 mcg/mL  Sensitive  >16 mcg/mL  Resistant      Cefepime    <=2 mcg/mL  Sensitive      Cefotaxime    8 mcg/mL  Sensitive      Cefotetan    <=16 mcg/mL  Sensitive      Ceftaroline  <=0.5 mcg/mL  Sensitive        Ceftazidime    <=1 mcg/mL  Sensitive      Ceftriaxone    <=1 mcg/mL  Sensitive      Ciprofloxacin    <=1 mcg/mL  Sensitive      Clindamycin  <=0.5 mcg/mL  Sensitive        Daptomycin  <=1 mcg/mL  Sensitive        Ertapenem    <=0.5 mcg/mL  Sensitive      Erythromycin  <=0.25 mcg/mL  Sensitive        Gentamicin    <=4 mcg/mL  Sensitive      Oxacillin  <=0.25 mcg/mL  Sensitive        Pip/Tazobactam  <=4 mcg/mL  Sensitive  <=16 mcg/mL  Sensitive      Tetracycline  <=4 mcg/mL  Sensitive        Tobramycin    <=4 mcg/mL  Sensitive      Trimeth/Sulfa  <=0.5/9.5 m...  Sensitive  <=2/38 mcg/mL  Sensitive      Vancomycin  1 mcg/mL  Sensitive                Susceptibility      Proteus mirabilis      SHANON      Ampicillin  <=8 mcg/mL  Sensitive      Cefazolin  4 mcg/mL  Sensitive      Cefepime  <=2 mcg/mL  Sensitive      Cefotaxime  <=2 mcg/mL  Sensitive      Cefotetan  <=16 mcg/mL  Sensitive      Ceftazidime  <=1 mcg/mL  Sensitive       Ceftriaxone  <=1 mcg/mL  Sensitive      Ciprofloxacin  <=1 mcg/mL  Sensitive      Ertapenem  <=0.5 mcg/mL  Sensitive      Gentamicin  <=4 mcg/mL  Sensitive      Pip/Tazobactam  <=16 mcg/mL  Sensitive      Tobramycin  <=4 mcg/mL  Sensitive      Trimeth/Sulfa  <=2/38 mcg/mL  Sensitive          Assessment and Plan:   The following treatment plan was discussed with patient at length:    1. Infected skin ulcer with fat layer exposed (HCC)      -Finish PO Augmentin and Bactrim as directed.  Wounds improving, no gross s/sx of infection, no additional abx to follow.  -Monitor for s/sx of worsening off abx: increased redness, pain, swelling, drainage, breakdown of wound sites, fevers, chills, general malaise, etc.  Notify ID or go to ER should these s/sx occur.  -Continue with Wound Care as directed   2. Venous stasis ulcer of left lower extremity (HCC)      As above   3. Group A streptococcal infection      Augmentin as above   4. MSSA (methicillin susceptible Staphylococcus aureus) infection      Abx as above    5. Serratia infection      Bactrim as above   6. Proteus infection      Augmentin as above   7. Pseudomonas infection      Improving without abx treatment, continue with wound care as above.     Follow up: PRN, RTC sooner if needed. FU with PCP for ongoing chronic medical conditions.     KALLIE Díaz.    Case discussed with KEV Brantley with [facility].       Please note that this dictation was created using voice recognition software. I have  worked with technical experts from [facility] to optimize the interface.  I have made every reasonable attempt to correct obvious errors, but there may be errors of grammar and possibly content that I did not discover before finalizing the note.

## 2020-07-08 ASSESSMENT — ENCOUNTER SYMPTOMS
COUGH: 0
NERVOUS/ANXIOUS: 0
CHILLS: 0
CONSTIPATION: 0
MYALGIAS: 1
HEADACHES: 0
WEAKNESS: 1
VOMITING: 0
ABDOMINAL PAIN: 0
NAUSEA: 0
DIARRHEA: 0
FEVER: 0
SORE THROAT: 0
SHORTNESS OF BREATH: 0

## 2020-07-09 ENCOUNTER — OFFICE VISIT (OUTPATIENT)
Dept: WOUND CARE | Facility: MEDICAL CENTER | Age: 65
End: 2020-07-09
Attending: NURSE PRACTITIONER
Payer: MEDICARE

## 2020-07-09 VITALS
DIASTOLIC BLOOD PRESSURE: 78 MMHG | TEMPERATURE: 97.9 F | HEART RATE: 60 BPM | SYSTOLIC BLOOD PRESSURE: 132 MMHG | OXYGEN SATURATION: 97 % | RESPIRATION RATE: 12 BRPM

## 2020-07-09 DIAGNOSIS — M21.962 ACQUIRED DEFORMITY OF LEFT ANKLE AND FOOT: ICD-10-CM

## 2020-07-09 DIAGNOSIS — L97.912 SKIN ULCER OF MULTIPLE SITES OF RIGHT LOWER EXTREMITY WITH FAT LAYER EXPOSED (HCC): ICD-10-CM

## 2020-07-09 DIAGNOSIS — L97.922 SKIN ULCER OF LEFT LOWER LEG WITH FAT LAYER EXPOSED (HCC): Primary | ICD-10-CM

## 2020-07-09 DIAGNOSIS — I87.8 CHRONIC VENOUS STASIS: ICD-10-CM

## 2020-07-09 PROCEDURE — 11045 DBRDMT SUBQ TISS EACH ADDL: CPT | Performed by: NURSE PRACTITIONER

## 2020-07-09 PROCEDURE — 11045 DBRDMT SUBQ TISS EACH ADDL: CPT

## 2020-07-09 PROCEDURE — 11042 DBRDMT SUBQ TIS 1ST 20SQCM/<: CPT

## 2020-07-09 PROCEDURE — 11042 DBRDMT SUBQ TIS 1ST 20SQCM/<: CPT | Performed by: NURSE PRACTITIONER

## 2020-07-09 ASSESSMENT — ENCOUNTER SYMPTOMS
DEPRESSION: 1
FEVER: 0
MYALGIAS: 0
CHILLS: 0
COUGH: 0
NERVOUS/ANXIOUS: 0
SHORTNESS OF BREATH: 0
DIARRHEA: 0
NAUSEA: 0
CONSTIPATION: 0
VOMITING: 0

## 2020-07-09 ASSESSMENT — PAIN SCALES - GENERAL: PAINLEVEL: 6=MODERATE PAIN

## 2020-07-09 NOTE — LETTER
2020    To: [facility]  Re:Goran Chavez,  1955    Wound Care Order: All wounds EXCEPT LLE lateral wound.     · Remove wraps and old dressings from bilateral legs.   · Wash both legs with a washcloth and soapy water.  · Rinse wounds with normal saline or wound cleanser.  · Pat dry with gauze.  · Apply skin moisturizer to intact skin.  · Apply zinc barrier paste to periwound  · Apply fenestrated Hydrofera Blue ready to all wounds- Make sure the shiny side with the writing is NOT against the wound bed.  · Cover all wounds with abdominal pad or nonadhesive foam  · Apply 2 layer compression wrap to both legs.      To LLE lateral wound:  Remove wound vac. Wash legs as stated above.   Cleanse wound with saline and pat dry  Apply no sting skin prep to sandrine wound  Apply vac drape to sandrine-wound. Ensure that vac drape covers any area where foam may come into contact with skin.   Cover wound bed with white foam. DO NOT ALLOW FOAM TO TOUCH INTACT SKIN  Cover white wound vac foam with vac drape.   Cut quarter sized hole into vac drape to accommodate trac pad.  Apply a black foam button to accommodate track pad  Apply trac pad over hole in vac drape.   Connect vac tubing to wound vac machine.  Resume negative pressure wound therapy at 125 mmHg continuous.  Apply 2 layer compression wrap from base of toes to 2 inches below back of knee to LLE.     Home health to see patient twice a week, patient to present to [facility] weekly.     Please call [facility] with any questions or concerns.    Thank you,  Sanju ANGULO

## 2020-07-09 NOTE — PROGRESS NOTES
Provider Encounter- Full Thickness wound    HISTORY OF PRESENT ILLNESS  Wound History:    START OF CARE IN CLINIC: 5/20/2020               REFERRING PROVIDER: KEV Keith                 WOUND ETIOLOGY: venous,  likely mixed etiology              LOCATION: Left lower leg, multiple wounds                                   Right lower leg, multiple wounds-first observed in clinic on initial evaluation, 6/5/2020                HISTORY: Patient is very well-known to this clinic from previous treatment of wounds to his left lower extremity.  He was discharged from the clinic in March 2020 due to full resolution of his wound.  He returned to the clinic approximately 3 weeks later, on 4/16, with cellulitis, edema, and large open wounds to his left lower leg, and was sent directly to [facility] emergency room.  He was admitted for IV antibiotics and surgical intervention.  On 4/22/2020, he underwent an I&D of his left lower extremity, fasciotomy, and VAC placement.  In the following weeks he underwent surgery 3 more times for irrigation and debridement.  He was discharged home on 5/21, on the VAC, with home health and a referral to [facility].       Pertinent Medical History:  Anxiety, Charcot's joint of left foot, non-diabetic (3/21/2016), Chronic congestive heart failure (HCC) (11/16/2017), Hepatitis C, chronic (HCC), Hypertension, Migraine, Polysubstance abuse (HCC) (3/8/2018), Tobacco use (4/18/2016), Ulcer of left lower extremity with necrosis of muscle (HCC) (3/21/2016), and Venous stasis ulcer ([facility]) (2017).                ETIOLOGY HISTORY:  Vascular Surgeon: Dr. White. Compression Circ-aid. Varicose Veins none visible     TOBACCO USE: Patient denies; patient also denies alcohol and recreational drug use    Patient's problem list, allergies, and current medications reviewed and updated in Epic    Interval History:  6/5/2020: Initial clinic visit with KEV Brantley.  Patient returns to clinic after a  "long hospitalization.  The VAC was held by home health due to severe maceration and deterioration of wound beds.  He also presents today with new wounds to his right lower extremity.  He states he does not know how or when these occurred, states, \"they just popped up\".  He is uncertain whether or not he has follow-up appointments with his surgeon.  He does not know what medications he is on.      6/12/2020 : Clinic visit with KEV Brantley.  Goran states he is feeling well today,denies fevers, chills, nausea, vomiting, cough or shortness of breath.  Condition of his periwound skin is significantly improved.  I would like to restart VAC, however he did not bring the VAC with him, and that has already been on for 2-1/2 weeks.  Will likely need to be reordered.  [facility] continues to see him several times per week for dressing changes.  He is tolerating compression without any difficulty.    6/19/2020 : Clinic visit with KEV Brantley.   Goran states he is feeling well today,denies fevers, chills, nausea, vomiting, cough or shortness of breath.  He brought his VAC with him today.  However,  too much slough on wound beds to consider re-applying.  [facility] continues to see him 2 times per week.  I have asked him to bring the VAC with him again next week, and will hopefully place it at that time.  Per patient, dressings are saturated between dressing changes.    6/26/2020 : Clinic visit with KEV Brantley.   Goran states he is feeling well today,denies fevers, chills, nausea, vomiting, cough or shortness of breath.  He brought his VAC with him again today, however we are not able to put it on as he is still having twice drainage.  Sensitivities from culture still pending.  However I did start him on Augmentin today which we are able to obtain from [facility] prior to his leaving the clinic today and had him take one before he left.  [facility] is been seen 2 times per week in between clinic visits.  " We will try and get them to see him over the weekend to do an additional dressing change.  Will adjust antibiotics once sensitivities are known.  Hopefully start VAC next clinic visit.    7/2/2020 : Clinic visit with KEV Brantley.    Goran states he is feeling well today,denies fevers, chills, nausea, vomiting, cough or shortness of breath.  His VAC is here in the clinic, however he does not have another home health appointment until Monday, 4 days from now.  He has been taking Augmentin as prescribed last week.  I informed him that I added Bactrim DS after I got sensitivity results, and that it should be waiting for him at his pharmacy.    7/9/2020: Clinic visit with KEV Luna. Patient states that they are feeling well today.  Patient denies fever, chills, nausea, vomiting, lightheadedness, dizziness, shortness of breath and chest pain.  All wound beds debrided in clinic today patient's wounds continue to progress beefy red granulation tissue to left lower extremity lateral wound tendon is being covered with granulation tissue continue with wound VAC and current plan of care.      REVIEW OF SYSTEMS:   Review of Systems   Constitutional: Negative for chills and fever.        States he is eating well, appetite normal   Respiratory: Negative for cough and shortness of breath.    Cardiovascular: Positive for leg swelling. Negative for chest pain.        Chronic swelling in legs, for several years   Gastrointestinal: Negative for constipation, diarrhea, nausea and vomiting.   Genitourinary: Negative for dysuria.   Musculoskeletal: Positive for joint pain. Negative for myalgias.        Chronic   Skin:        New wounds to right lower leg, patient does not know how or when they started  Hospitalized in April and May 2020 for left lower leg wound, underwent several surgeries   Psychiatric/Behavioral: Positive for depression. The patient is not nervous/anxious.         States he is a little bit  depressed about his legs, denies wanting to harm himself       PHYSICAL EXAMINATION:   /78   Pulse 60   Temp 36.6 °C (97.9 °F)   Resp 12   SpO2 97%     Physical Exam   Constitutional: He is oriented to person, place, and time and well-developed, well-nourished, and in no distress.   Disheveled unkept   HENT:   Head: Normocephalic and atraumatic.   Eyes: Pupils are equal, round, and reactive to light. Conjunctivae and EOM are normal.   Neck: Neck supple.   Cardiovascular: Intact distal pulses.   Pulmonary/Chest: Effort normal.   Musculoskeletal: Normal range of motion.         General: Deformity and edema present.      Comments: Left charcot foot and ankle  3+pitting edema to bilateral LE   Neurological: He is alert and oriented to person, place, and time.   Skin: Skin is warm. There is erythema.   Large full-thickness wounds to bilateral lower extremities  Further wound flowsheet and photos   Psychiatric: Affect normal.   Patient lacks insight into his own healthcare issues, defers to his daughter to keep track of his medications and appointments       WOUND ASSESSMENT         Wound 06/05/20 Full Thickness Wound Leg Left LLE anteriolateral (Active)   Wound Image    07/09/20 1030   Site Assessment Red;Yellow 07/09/20 1030   Periwound Assessment Edema;Fragile 07/09/20 1030   Margins Attached edges 07/09/20 1030   Closure Secondary intention 07/09/20 1030   Drainage Amount Copious 07/09/20 1030   Drainage Description Serosanguineous 07/09/20 1030   Treatments Cleansed;Topical Lidocaine;Provider debridement 07/09/20 1030   Wound Cleansing Foam Cleanser/Washcloth 07/09/20 1030   Periwound Protectant Skin Protectant Wipes to Periwound;Drape;TAC Ointment 07/09/20 1030   Dressing Cleansing/Solutions Not Applicable 07/09/20 1030   Dressing Options Compression Wrap Two Layer;Wound Vac;Hydrofera Blue Ready 07/09/20 1030   Dressing Changed Changed 07/09/20 1030   Dressing Status Clean;Dry;Intact 07/09/20 1030    Dressing Change/Treatment Frequency Monday, Wednesday, Friday, and As Needed 07/09/20 1030   Non-staged Wound Description Full thickness 07/09/20 1030   Wound Length (cm) 18.2 cm 07/09/20 1030   Wound Width (cm) 7.5 cm 07/09/20 1030   Wound Depth (cm) 0.3 cm 06/12/20 0945   Wound Surface Area (cm^2) 136.5 cm^2 07/09/20 1030   Wound Volume (cm^3) 86.1 cm^3 06/12/20 0945   Post-Procedure Length (cm) 18.3 cm 07/09/20 1030   Post-Procedure Width (cm) 7.6 cm 07/09/20 1030   Post-Procedure Depth (cm) 0.4 cm 06/26/20 1035   Post-Procedure Surface Area (cm^2) 139.08 cm^2 07/09/20 1030   Post-Procedure Volume (cm^3) 62.4 cm^3 06/26/20 1035   Wound Healing % -58 06/12/20 0945   Wound Bed Granulation (%) 75 % 06/12/20 0945   Wound Bed Slough (%) 40 % 07/02/20 1013   Wound Bed Granulation (%) - Post-Procedure 100 % 06/12/20 0945   Wound Bed Slough % - (Post-Procedure) 5 % 06/05/20 0930   Tunneling (cm) 0 cm 07/09/20 1030   Undermining (cm) 0 cm 07/09/20 1030   Wound Odor None 07/09/20 1030   Pulses Left;2+;DP 06/05/20 0930   Exposed Structures None 07/09/20 1030       Wound 06/05/20 Full Thickness Wound Ankle LLE distal posteriomedial (Active)   Wound Image    07/09/20 1030   Site Assessment Red;Yellow 07/09/20 1030   Periwound Assessment Edema;Fragile 07/09/20 1030   Margins Attached edges 07/09/20 1030   Closure Secondary intention 07/09/20 1030   Drainage Amount Large 07/09/20 1030   Drainage Description Serosanguineous 07/09/20 1030   Treatments Cleansed;Topical Lidocaine;Provider debridement 07/09/20 1030   Wound Cleansing Foam Cleanser/Washcloth 07/09/20 1030   Periwound Protectant Barrier Paste;Skin Moisturizer;TAC Ointment 07/09/20 1030   Dressing Cleansing/Solutions Not Applicable 07/09/20 1030   Dressing Options Hydrofera Blue Ready;Absorbent Abdominal Pad;Compression Wrap Two Layer 07/09/20 1030   Dressing Changed Changed 07/09/20 1030   Dressing Status Clean;Dry;Intact 07/09/20 1030   Dressing Change/Treatment  Frequency Monday, Wednesday, Friday, and As Needed 07/09/20 1030   Non-staged Wound Description Full thickness 07/09/20 1030   Wound Length (cm) 3 cm 07/09/20 1030   Wound Width (cm) 5.8 cm 07/09/20 1030   Wound Depth (cm) 0.2 cm 07/09/20 1030   Wound Surface Area (cm^2) 17.4 cm^2 07/09/20 1030   Wound Volume (cm^3) 3.48 cm^3 07/09/20 1030   Post-Procedure Length (cm) 3 cm 07/09/20 1030   Post-Procedure Width (cm) 6 cm 07/09/20 1030   Post-Procedure Depth (cm) 0.3 cm 07/09/20 1030   Post-Procedure Surface Area (cm^2) 18 cm^2 07/09/20 1030   Post-Procedure Volume (cm^3) 5.4 cm^3 07/09/20 1030   Wound Healing % 41 07/09/20 1030   Wound Bed Granulation (%) 65 % 06/12/20 0945   Wound Bed Slough (%) 25 % 07/02/20 1013   Wound Bed Granulation (%) - Post-Procedure 80 % 06/12/20 0945   Wound Bed Slough % - (Post-Procedure) 20 % 06/12/20 0945   Tunneling (cm) 0 cm 07/09/20 1030   Undermining (cm) 0 cm 07/09/20 1030   Wound Odor None 07/09/20 1030   Pulses Left;2+;DP 06/05/20 0930   Exposed Structures None 07/09/20 1030       Wound 06/05/20 Full Thickness Wound Leg Left LLE posteriomedial (Active)   Wound Image    07/09/20 1030   Site Assessment Red;Yellow 07/09/20 1030   Periwound Assessment Edema;Fragile 07/09/20 1030   Margins Attached edges 07/09/20 1030   Closure Secondary intention 07/09/20 1030   Drainage Amount Large 07/09/20 1030   Drainage Description Serosanguineous 07/09/20 1030   Treatments Cleansed;Topical Lidocaine;Provider debridement 07/09/20 1030   Wound Cleansing Foam Cleanser/Washcloth 07/09/20 1030   Periwound Protectant Barrier Paste;Skin Moisturizer;TAC Ointment 07/09/20 1030   Dressing Cleansing/Solutions Not Applicable 07/09/20 1030   Dressing Options Hydrofera Blue Ready;Absorbent Abdominal Pad;Compression Wrap Two Layer 07/09/20 1030   Dressing Changed Changed 07/09/20 1030   Dressing Status Clean;Dry;Intact 07/09/20 1030   Dressing Change/Treatment Frequency Monday, Wednesday, Friday, and As Needed  07/09/20 1030   Non-staged Wound Description Full thickness 07/09/20 1030   Wound Length (cm) 10.5 cm 07/09/20 1030   Wound Width (cm) 8.7 cm 07/09/20 1030   Wound Depth (cm) 0.2 cm 07/09/20 1030   Wound Surface Area (cm^2) 91.35 cm^2 07/09/20 1030   Wound Volume (cm^3) 18.27 cm^3 07/09/20 1030   Post-Procedure Length (cm) 10.6 cm 07/09/20 1030   Post-Procedure Width (cm) 8.8 cm 07/09/20 1030   Post-Procedure Depth (cm) 0.2 cm 07/09/20 1030   Post-Procedure Surface Area (cm^2) 93.28 cm^2 07/09/20 1030   Post-Procedure Volume (cm^3) 18.66 cm^3 07/09/20 1030   Wound Healing % -142 07/09/20 1030   Wound Bed Granulation (%) 40 % 06/12/20 0945   Wound Bed Slough (%) 40 % 07/02/20 1013   Wound Bed Granulation (%) - Post-Procedure 80 % 06/12/20 0945   Wound Bed Slough % - (Post-Procedure) 20 % 06/12/20 0945   Tunneling (cm) 0 cm 07/09/20 1030   Undermining (cm) 0 cm 07/09/20 1030   Wound Odor None 07/09/20 1030   Pulses 2+;Left;DP 06/05/20 0930   Exposed Structures None 07/09/20 1030       Wound 06/05/20 Full Thickness Wound Leg Right R anterolateral LE (Active)   Wound Image    07/09/20 1030   Site Assessment Red;Yellow 07/09/20 1030   Periwound Assessment Edema;Fragile 07/09/20 1030   Margins Attached edges 07/09/20 1030   Closure Secondary intention 07/09/20 1030   Drainage Amount Moderate 07/09/20 1030   Drainage Description Serosanguineous 07/09/20 1030   Treatments Cleansed;Topical Lidocaine;Provider debridement 07/09/20 1030   Wound Cleansing Foam Cleanser/Washcloth 07/09/20 1030   Periwound Protectant Barrier Paste;Skin Moisturizer;TAC Ointment 07/09/20 1030   Dressing Cleansing/Solutions Not Applicable 07/09/20 1030   Dressing Options Hydrofera Blue Ready;Absorbent Abdominal Pad;Compression Wrap Two Layer 07/09/20 1030   Dressing Changed Changed 07/09/20 1030   Dressing Status Clean;Dry;Intact 07/09/20 1030   Dressing Change/Treatment Frequency Every 72 hrs, and As Needed 07/09/20 1030   Non-staged Wound  Description Full thickness 07/09/20 1030   Wound Length (cm) 6 cm 07/09/20 1030   Wound Width (cm) 3.5 cm 07/09/20 1030   Wound Depth (cm) 0.1 cm 07/09/20 1030   Wound Surface Area (cm^2) 21 cm^2 07/09/20 1030   Wound Volume (cm^3) 2.1 cm^3 07/09/20 1030   Post-Procedure Length (cm) 6 cm 07/09/20 1030   Post-Procedure Width (cm) 3.6 cm 07/09/20 1030   Post-Procedure Depth (cm) 0.2 cm 07/09/20 1030   Post-Procedure Surface Area (cm^2) 21.6 cm^2 07/09/20 1030   Post-Procedure Volume (cm^3) 4.32 cm^3 07/09/20 1030   Wound Healing % 58 07/09/20 1030   Wound Bed Granulation (%) 20 % 06/12/20 0945   Wound Bed Slough (%) 80 % 06/12/20 0945   Wound Bed Granulation (%) - Post-Procedure 60 % 06/12/20 0945   Wound Bed Slough % - (Post-Procedure) 40 % 06/12/20 0945   Tunneling (cm) 0 cm 07/09/20 1030   Undermining (cm) 0 cm 07/09/20 1030   Wound Odor None 07/09/20 1030   Pulses 2+;DP;Right 06/05/20 0930   Exposed Structures None 07/09/20 1030       Wound 06/05/20 Full Thickness Wound Ankle Right lateral ankle (Active)   Wound Image    07/09/20 1030   Site Assessment Yellow 07/02/20 1013   Periwound Assessment Edema;Fragile 07/09/20 1030   Margins Attached edges 07/02/20 1013   Closure Secondary intention 06/26/20 1035   Drainage Amount None 07/09/20 1030   Drainage Description Serosanguineous 07/02/20 1013   Treatments Cleansed;Topical Lidocaine;Site care 07/02/20 1013   Wound Cleansing Foam Cleanser/Washcloth 07/09/20 1030   Periwound Protectant Barrier Paste 07/09/20 1030   Dressing Cleansing/Solutions Not Applicable 06/26/20 1035   Dressing Options Compression Wrap Two Layer 07/09/20 1030   Dressing Changed New 06/26/20 1035   Dressing Status Clean;Dry;Intact 06/26/20 1035   Dressing Change/Treatment Frequency Every 48 hrs, and As Needed 06/26/20 1035   Non-staged Wound Description Full thickness 07/09/20 1030   Wound Length (cm) 0.5 cm 07/02/20 1013   Wound Width (cm) 0.3 cm 07/02/20 1013   Wound Depth (cm) 0.1 cm 07/02/20  1013   Wound Surface Area (cm^2) 0.15 cm^2 07/02/20 1013   Wound Volume (cm^3) 0.02 cm^3 07/02/20 1013   Post-Procedure Length (cm) 2.8 cm 06/26/20 1035   Post-Procedure Width (cm) 1.9 cm 06/26/20 1035   Post-Procedure Depth (cm) 0.1 cm 06/26/20 1035   Post-Procedure Surface Area (cm^2) 5.32 cm^2 06/26/20 1035   Post-Procedure Volume (cm^3) 0.53 cm^3 06/26/20 1035   Wound Healing % 93 07/02/20 1013   Wound Bed Granulation (%) 25 % 06/12/20 0945   Wound Bed Slough (%) 100 % 07/02/20 1013   Wound Bed Granulation (%) - Post-Procedure 35 % 06/12/20 0945   Wound Bed Slough % - (Post-Procedure) 65 % 06/12/20 0945   Tunneling (cm) 0 cm 07/09/20 1030   Undermining (cm) 0 cm 07/09/20 1030   Wound Odor None 07/09/20 1030   Pulses Bounding;Right;DP 06/05/20 0930   Exposed Structures None 07/09/20 1030               PROCEDURE: Excisional debridement of left lower extremity wounds x 3  -2% viscous lidocaine applied topically to wound beds for approximately 10 minutes prior to debridement  -Curette used to excise thick layer of slough from wound beds.  Excisional debridement was performed to remove devitalized tissue until healthy, bleeding tissue was visualized. Total area debrided 271.96 cm².  Tissue debrided into the subcutaneous layer.    -Bleeding controlled with manual pressure.    -Wounds cleansed with normal saline  -Wound culture collected, wound care completed by  RN, refer to flowsheet  -Patient tolerated the procedure well, without c/o pain or discomfort.         Pertinent Labs and Diagnostics:    Labs:     A1c:   Lab Results   Component Value Date/Time    HBA1C 5.7 (H) 01/23/2020 11:22 AM          VASCULAR STUDIES: BHUPINDER 12/27/2019   Right.    Doppler waveform of the common femoral artery is of high amplitude and    triphasic.    Doppler waveforms at the ankle are brisk and triphasic.    Ankle-brachial index is normal.       Left.    Could not perform ankle-brachial index due to large blisters/ulcers.   Normal  toe-brachial index: 0.94   Doppler waveform of the common femoral artery is of high amplitude and    triphasic.    Biphasic waveforms seen at the ankle.     WOUND CULTURE:    DATE: 6/19/2020-culture collected in clinic  Culture Result  Abnormal     Beta Hemolytic Streptococcus group A   Heavy growth     Culture Result  Abnormal     Staphylococcus aureus   Moderate growth     Culture Result  Abnormal     Serratia marcescens   Moderate growth     Culture Result  Abnormal     Proteus mirabilis   Moderate growth     Culture Result  Abnormal     Pseudomonas species   Light growth   unable to isolate for sensitivity            ASSESSMENT AND PLAN:     1. Skin ulcer of left lower leg with fat layer exposed (HCC)  Comment: Patient has history of recurring ulcerations to left lower extremity.  Has undergone ablation procedures by Dr. White.  Discharged from [facility] in March 2020 due to healing, returned approximately 3 weeks later much worse.  Hospitalized from 4/16 until 5/21/2020 underwent multiple surgical debridements.    7/9/2020: Slough buildup to all wound beds removed with curette 100% debridement obtained clean wound beds after debridement.  -Excisional debridement of wound in clinic today, medically necessary to promote wound healing.  -Wound culture positive for strep, staph, and Pseudomonas, sensitivities pending.  Augmentin prescribed and obtained for patient today.  Will adjust based on sensitivities.  -White foam used to left lateral wound to decrease hyper granulation wound is progressing nicely with granulation tissue over tendon.  -Patient to be seen in clinic 1 time a week for assessment and debridement and wound VAC placement.  Patient to be seen by home health twice a week for wound VAC change and assessment.  -Patient placed on a course of Bactrim with end date of 7/11/2020.      Wound care: Wound VAC with white foam to wound bed, black button, 2 layer compression wrap to left lower extremity wound.   Other wounds with Hydrofera Blue and 2 layer compression wrap    2. Skin ulcer of multiple sites of right lower extremity with fat layer exposed (HCC)  Comments: First observed in clinic on 6/5/2020, on initial evaluation. Patient is not certain when or how they occurred.  There is no documentation of wounds to this leg during recent hospitalization, so presumably they occurred after he went home.    7/9/2020    wound beds are again covered with thick layer of slough.   -Excisional debridement of wound in clinic today, medically necessary to promote wound healing.      -See above        3. Chronic venous stasis  Comments: Hemosiderin staining, brawny edema, scarring- findings consistent with CVI.  Pt established at [facility], underwent ablation procedures in 2019.     -Consider referral back to vascular if wounds failed to progress or deteriorate.    4. Acquired deformity of left ankle and foot  Comments: Charcot deformity of foot and ankle.  Very little ROM of ankle.  Patient was referred to orthopedics previously, however did not make appointment.    -Patient will need to follow-up with orthopedics once wounds are healed.       PATIENT EDUCATION  - Pt advised to go to ER for any increased redness, swelling, drainage or odor, or if he develops fever, chills, nausea or vomiting.        Please note that this note may have been created using voice recognition software. I have worked with technical experts from [employer] to optimize the interface.  I have made every reasonable attempt to correct obvious errors, but there may be errors of grammar and possibly content that I did not discover before finalizing the note.    N

## 2020-07-10 DIAGNOSIS — G47.00 INSOMNIA, UNSPECIFIED TYPE: ICD-10-CM

## 2020-07-10 RX ORDER — AMITRIPTYLINE HYDROCHLORIDE 25 MG/1
TABLET, FILM COATED ORAL
Qty: 30 TAB | Refills: 0 | Status: SHIPPED | OUTPATIENT
Start: 2020-07-10 | End: 2020-08-27

## 2020-07-15 ENCOUNTER — OFFICE VISIT (OUTPATIENT)
Dept: WOUND CARE | Facility: MEDICAL CENTER | Age: 65
End: 2020-07-15
Attending: NURSE PRACTITIONER
Payer: MEDICARE

## 2020-07-15 VITALS
TEMPERATURE: 97.8 F | OXYGEN SATURATION: 98 % | HEART RATE: 70 BPM | RESPIRATION RATE: 20 BRPM | DIASTOLIC BLOOD PRESSURE: 83 MMHG | SYSTOLIC BLOOD PRESSURE: 141 MMHG

## 2020-07-15 DIAGNOSIS — I87.8 CHRONIC VENOUS STASIS: ICD-10-CM

## 2020-07-15 DIAGNOSIS — L08.9 WOUND INFECTION: ICD-10-CM

## 2020-07-15 DIAGNOSIS — T14.8XXA WOUND INFECTION: ICD-10-CM

## 2020-07-15 DIAGNOSIS — M21.962 ACQUIRED DEFORMITY OF LEFT ANKLE AND FOOT: ICD-10-CM

## 2020-07-15 DIAGNOSIS — L97.922 SKIN ULCER OF LEFT LOWER LEG WITH FAT LAYER EXPOSED (HCC): ICD-10-CM

## 2020-07-15 DIAGNOSIS — L97.912 SKIN ULCER OF MULTIPLE SITES OF RIGHT LOWER EXTREMITY WITH FAT LAYER EXPOSED (HCC): ICD-10-CM

## 2020-07-15 PROCEDURE — 11045 DBRDMT SUBQ TISS EACH ADDL: CPT | Performed by: NURSE PRACTITIONER

## 2020-07-15 PROCEDURE — 11042 DBRDMT SUBQ TIS 1ST 20SQCM/<: CPT

## 2020-07-15 PROCEDURE — 11042 DBRDMT SUBQ TIS 1ST 20SQCM/<: CPT | Performed by: NURSE PRACTITIONER

## 2020-07-15 PROCEDURE — 11045 DBRDMT SUBQ TISS EACH ADDL: CPT

## 2020-07-15 PROCEDURE — 97605 NEG PRS WND THER DME<=50SQCM: CPT

## 2020-07-15 ASSESSMENT — ENCOUNTER SYMPTOMS
CONSTIPATION: 0
DEPRESSION: 1
CHILLS: 0
FEVER: 0
NAUSEA: 0
VOMITING: 0
SHORTNESS OF BREATH: 0
NERVOUS/ANXIOUS: 0
MYALGIAS: 0
COUGH: 0
CLAUDICATION: 0
DIARRHEA: 0

## 2020-07-15 NOTE — PATIENT INSTRUCTIONS
-Keep your wound dressing clean, dry, and intact.    -Remove your compression wrap if you have severe pain, severe swelling, numbness, color change, or temperature change in your toes. If you need to remove your compression wrap, do so by unrolling it. Do not cut the compression wrap off to prevent cutting yourself on accident.    -Wound vac may not have any drainage in tube or cannister & it will still be working.   Change cannister if it does become full by pressing tab on side of machine to remove canister and snap on new one. Full canister can be thrown in the trash. If cannister fills with bright red blood - go to ER. Dressing will be changed every MWF at the wound clini.  If you are having issues with your wound VAC, please consider patching leaks, changing the canister, or calling [phone] for troubleshooting. If the wound VAC has been off or un-operational for over 2 hours, call wound care center to inform them and remove all dressings including black foam and replace with normal saline damp gauze.     -Should you experience any significant changes in your wound(s), such as infection (redness, swelling, localized heat, increased pain, fever > 101 F, chills) or have any questions regarding your home care instructions, please contact the wound center at [phone]. If after hours, contact your primary care physician or go to the hospital emergency room.

## 2020-07-15 NOTE — LETTER
July 15, 2020    To: [facility]  Re:Goran Chavez,  1955    Wound Care Order: All wounds EXCEPT LLE lateral wound.     · Remove wraps and old dressings from bilateral legs.   · Wash both legs with a washcloth and soapy water.  · Rinse wounds with normal saline or wound cleanser.  · Pat dry with gauze.  · Apply skin moisturizer to intact skin.  · Apply zinc barrier paste to periwound  · Apply fenestrated Hydrofera Blue ready to all wounds- Make sure the shiny side with the writing is NOT against the wound bed.  · Cover all wounds with abdominal pad or nonadhesive foam  · Apply 2 layer compression wrap to both legs.    To LLE lateral wound:  Remove wound vac. Wash legs as stated above.   Cleanse wound with saline and pat dry  Apply no sting skin prep to sandrine wound  Apply vac drape to sandrine-wound. Ensure that vac drape covers any area where foam may come into contact with skin.   Cover wound bed with white foam. DO NOT ALLOW FOAM TO TOUCH INTACT SKIN  Cover white wound vac foam with vac drape.   Cut quarter sized hole into vac drape to accommodate trac pad.  Apply a black foam button to accommodate track pad  Apply trac pad over hole in vac drape.   Connect vac tubing to wound vac machine.  Resume negative pressure wound therapy at 125 mmHg continuous.  Apply 2 layer compression wrap from base of toes to 2 inches below back of knee to LLE.     Home health to see patient twice a week, patient to present to [facility] weekly.   Please call [facility] with any questions or concerns.    Thank you,        KEV Mead

## 2020-07-15 NOTE — PROGRESS NOTES
Provider Encounter- Full Thickness wound    HISTORY OF PRESENT ILLNESS  Wound History:    START OF CARE IN CLINIC: 5/20/2020               REFERRING PROVIDER: KEV Keith                 WOUND ETIOLOGY: venous,  likely mixed etiology              LOCATION: Left lower leg, multiple wounds                                   Right lower leg, multiple wounds-first observed in clinic on initial evaluation, 6/5/2020                HISTORY: Patient is very well-known to this clinic from previous treatment of wounds to his left lower extremity.  He was discharged from the clinic in March 2020 due to full resolution of his wound.  He returned to the clinic approximately 3 weeks later, on 4/16, with cellulitis, edema, and large open wounds to his left lower leg, and was sent directly to [facility] emergency room.  He was admitted for IV antibiotics and surgical intervention.  On 4/22/2020, he underwent an I&D of his left lower extremity, fasciotomy, and VAC placement.  In the following weeks he underwent surgery 3 more times for irrigation and debridement.  He was discharged home on 5/21, on the VAC, with home health and a referral to [facility].       Pertinent Medical History:  Anxiety, Charcot's joint of left foot, non-diabetic (3/21/2016), Chronic congestive heart failure (HCC) (11/16/2017), Hepatitis C, chronic (HCC), Hypertension, Migraine, Polysubstance abuse (HCC) (3/8/2018), Tobacco use (4/18/2016), Ulcer of left lower extremity with necrosis of muscle (HCC) (3/21/2016), and Venous stasis ulcer ([facility]) (2017).                ETIOLOGY HISTORY:  Vascular Surgeon: Dr. White. Compression Circ-aid. Varicose Veins none visible     TOBACCO USE: Patient denies; patient also denies alcohol and recreational drug use    Patient's problem list, allergies, and current medications reviewed and updated in Epic    Interval History:  6/5/2020: Initial clinic visit with KEV Brantley.  Patient returns to clinic after a  "long hospitalization.  The VAC was held by home health due to severe maceration and deterioration of wound beds.  He also presents today with new wounds to his right lower extremity.  He states he does not know how or when these occurred, states, \"they just popped up\".  He is uncertain whether or not he has follow-up appointments with his surgeon.  He does not know what medications he is on.      6/12/2020 : Clinic visit with KEV Brantley.  Goran states he is feeling well today,denies fevers, chills, nausea, vomiting, cough or shortness of breath.  Condition of his periwound skin is significantly improved.  I would like to restart VAC, however he did not bring the VAC with him, and that has already been on for 2-1/2 weeks.  Will likely need to be reordered.  [facility] continues to see him several times per week for dressing changes.  He is tolerating compression without any difficulty.    6/19/2020 : Clinic visit with KEV Brantley.   Goran states he is feeling well today,denies fevers, chills, nausea, vomiting, cough or shortness of breath.  He brought his VAC with him today.  However,  too much slough on wound beds to consider re-applying.  [facility] continues to see him 2 times per week.  I have asked him to bring the VAC with him again next week, and will hopefully place it at that time.  Per patient, dressings are saturated between dressing changes.    6/26/2020 : Clinic visit with KEV Brantley.   Goran states he is feeling well today,denies fevers, chills, nausea, vomiting, cough or shortness of breath.  He brought his VAC with him again today, however we are not able to put it on as he is still having twice drainage.  Sensitivities from culture still pending.  However I did start him on Augmentin today which we are able to obtain from [facility] prior to his leaving the clinic today and had him take one before he left.  [facility] is been seen 2 times per week in between clinic visits.  " We will try and get them to see him over the weekend to do an additional dressing change.  Will adjust antibiotics once sensitivities are known.  Hopefully start VAC next clinic visit.    7/2/2020 : Clinic visit with KEV Brantley.    Goran states he is feeling well today,denies fevers, chills, nausea, vomiting, cough or shortness of breath.  His VAC is here in the clinic, however he does not have another home health appointment until Monday, 4 days from now.  He has been taking Augmentin as prescribed last week.  I informed him that I added Bactrim DS after I got sensitivity results, and that it should be waiting for him at his pharmacy.    7/9/2020: Clinic visit with KEV Luna. Patient states that they are feeling well today.  Patient denies fever, chills, nausea, vomiting, lightheadedness, dizziness, shortness of breath and chest pain.  All wound beds debrided in clinic today patient's wounds continue to progress beefy red granulation tissue to left lower extremity lateral wound tendon is being covered with granulation tissue continue with wound VAC and current plan of care.    7/15/2020 : Clinic visit with KEV Brantley.  Goran states he is feeling well today,denies fevers, chills, nausea, vomiting, cough or shortness of breath.  Home health continues to see him in between clinic visits.  He states he has not been smoking, drinking or using any street drugs.          REVIEW OF SYSTEMS:   Review of Systems   Constitutional: Negative for chills and fever.        States he is eating well, appetite normal   Respiratory: Negative for cough and shortness of breath.    Cardiovascular: Positive for leg swelling. Negative for chest pain and claudication.        Chronic swelling in legs, for several years   Gastrointestinal: Negative for constipation, diarrhea, nausea and vomiting.   Genitourinary: Negative for dysuria.   Musculoskeletal: Positive for joint pain. Negative for myalgias.         Chronic   Psychiatric/Behavioral: Positive for depression. The patient is not nervous/anxious.        PHYSICAL EXAMINATION:   /83   Pulse 70   Temp 36.6 °C (97.8 °F)   Resp 20   SpO2 98%     Physical Exam   Constitutional: He is oriented to person, place, and time and well-developed, well-nourished, and in no distress.   Disheveled unkept   HENT:   Head: Normocephalic and atraumatic.   Eyes: Pupils are equal, round, and reactive to light. Conjunctivae and EOM are normal.   Neck: Neck supple.   Cardiovascular: Intact distal pulses.   Pulmonary/Chest: Effort normal.   Musculoskeletal: Normal range of motion.         General: Deformity and edema present.      Comments: Left charcot foot and ankle  3+pitting edema to bilateral LE   Neurological: He is alert and oriented to person, place, and time.   Skin: Skin is warm. There is erythema.   Large full-thickness wounds to bilateral lower extremities  Further wound flowsheet and photos   Psychiatric: Affect normal.   Patient lacks insight into his own healthcare issues, defers to his daughter to keep track of his medications and appointments       WOUND ASSESSMENT           Wound 06/05/20 Full Thickness Wound Leg Left LLE anteriolateral (Active)   Wound Image    07/15/20 1430   Site Assessment Red;[place] 07/15/20 1430   Periwound Assessment Edema;Fragile 07/15/20 1430   Margins Attached edges 07/15/20 1430   Closure Secondary intention 07/15/20 1430   Drainage Amount Large 07/15/20 1430   Drainage Description Serosanguineous 07/15/20 1430   Treatments Cleansed;Topical Lidocaine;Provider debridement 07/15/20 1430   Wound Cleansing Foam Cleanser/Washcloth 07/15/20 1430   Periwound Protectant Skin Protectant Wipes to Periwound;Drape;TAC Ointment 07/15/20 1430   Dressing Cleansing/Solutions Not Applicable 07/15/20 1430   Dressing Options Wound Vac;Compression Wrap Two Layer 07/15/20 1430   Dressing Changed Changed 07/15/20 1430   Dressing Status Clean;Dry;Intact  07/15/20 1430   Dressing Change/Treatment Frequency Monday, Wednesday, Friday, and As Needed 07/15/20 1430   Non-staged Wound Description Full thickness 07/15/20 1430   Wound Length (cm) 17.5 cm 07/15/20 1430   Wound Width (cm) 6.7 cm 07/15/20 1430   Wound Depth (cm) 0.3 cm 06/12/20 0945   Wound Surface Area (cm^2) 117.25 cm^2 07/15/20 1430   Wound Volume (cm^3) 86.1 cm^3 06/12/20 0945   Post-Procedure Length (cm) 17.8 cm 07/15/20 1430   Post-Procedure Width (cm) 6.9 cm 07/15/20 1430   Post-Procedure Depth (cm) 0.4 cm 06/26/20 1035   Post-Procedure Surface Area (cm^2) 122.82 cm^2 07/15/20 1430   Post-Procedure Volume (cm^3) 62.4 cm^3 06/26/20 1035   Wound Healing % -58 06/12/20 0945   Wound Bed Granulation (%) 100 % 07/15/20 1430   Wound Bed Slough (%) 40 % 07/02/20 1013   Wound Bed Granulation (%) - Post-Procedure 100 % 06/12/20 0945   Wound Bed Slough % - (Post-Procedure) 5 % 06/05/20 0930   Tunneling (cm) 0 cm 07/15/20 1430   Undermining (cm) 0 cm 07/15/20 1430   Wound Odor None 07/15/20 1430   Pulses Left;2+;DP 06/05/20 0930   Exposed Structures None 07/15/20 1430       Wound 06/05/20 Full Thickness Wound Ankle LLE distal posteriomedial (Active)   Wound Image    07/15/20 1430   Site Assessment Pink;Red;Yellow 07/15/20 1430   Periwound Assessment Edema;Fragile 07/15/20 1430   Margins Attached edges 07/15/20 1430   Closure Secondary intention 07/15/20 1430   Drainage Amount Large 07/15/20 1430   Drainage Description Serosanguineous 07/15/20 1430   Treatments Cleansed;Topical Lidocaine;Provider debridement 07/15/20 1430   Wound Cleansing Foam Cleanser/Washcloth 07/15/20 1430   Periwound Protectant TAC Ointment;Skin Moisturizer;Stoma Powder;Barrier Paste 07/15/20 1430   Dressing Cleansing/Solutions Not Applicable 07/15/20 1430   Dressing Options Hydrofera Blue Ready;Absorbent Abdominal Pad;Compression Wrap Two Layer 07/15/20 1430   Dressing Changed Changed 07/15/20 1430   Dressing Status Clean;Dry;Intact 07/15/20  1430   Dressing Change/Treatment Frequency Monday, Wednesday, Friday, and As Needed 07/15/20 1430   Non-staged Wound Description Full thickness 07/15/20 1430   Wound Length (cm) 2.1 cm 07/15/20 1430   Wound Width (cm) 5.7 cm 07/15/20 1430   Wound Depth (cm) 0.2 cm 07/15/20 1430   Wound Surface Area (cm^2) 11.97 cm^2 07/15/20 1430   Wound Volume (cm^3) 2.39 cm^3 07/15/20 1430   Post-Procedure Length (cm) 2.2 cm 07/15/20 1430   Post-Procedure Width (cm) 5.7 cm 07/15/20 1430   Post-Procedure Depth (cm) 0.2 cm 07/15/20 1430   Post-Procedure Surface Area (cm^2) 12.54 cm^2 07/15/20 1430   Post-Procedure Volume (cm^3) 2.51 cm^3 07/15/20 1430   Wound Healing % 59 07/15/20 1430   Wound Bed Granulation (%) 65 % 06/12/20 0945   Wound Bed Slough (%) 30 % 07/15/20 1430   Wound Bed Granulation (%) - Post-Procedure 80 % 06/12/20 0945   Wound Bed Slough % - (Post-Procedure) 20 % 06/12/20 0945   Tunneling (cm) 0 cm 07/15/20 1430   Undermining (cm) 0 cm 07/15/20 1430   Wound Odor None 07/15/20 1430   Pulses Left;2+;DP 06/05/20 0930   Exposed Structures None 07/15/20 1430       Wound 06/05/20 Full Thickness Wound Leg Left LLE posteriomedial (Active)   Wound Image    07/15/20 1430   Site Assessment Pink;Red;Yellow 07/15/20 1430   Periwound Assessment Edema;Fragile 07/15/20 1430   Margins Attached edges 07/15/20 1430   Closure Secondary intention 07/15/20 1430   Drainage Amount Large 07/15/20 1430   Drainage Description Serosanguineous 07/15/20 1430   Treatments Cleansed;Topical Lidocaine;Provider debridement 07/15/20 1430   Wound Cleansing Foam Cleanser/Washcloth 07/15/20 1430   Periwound Protectant Skin Moisturizer;TAC Ointment;Stoma Powder;Barrier Paste 07/15/20 1430   Dressing Cleansing/Solutions Not Applicable 07/15/20 1430   Dressing Options Hydrofera Blue Ready;Absorbent Abdominal Pad;Compression Wrap Two Layer 07/15/20 1430   Dressing Changed Changed 07/15/20 1430   Dressing Status Clean;Dry;Intact 07/15/20 1430   Dressing  Change/Treatment Frequency Monday, Wednesday, Friday, and As Needed 07/15/20 1430   Non-staged Wound Description Full thickness 07/15/20 1430   Wound Length (cm) 9.9 cm 07/15/20 1430   Wound Width (cm) 7.5 cm 07/15/20 1430   Wound Depth (cm) 0.2 cm 07/15/20 1430   Wound Surface Area (cm^2) 74.25 cm^2 07/15/20 1430   Wound Volume (cm^3) 14.85 cm^3 07/15/20 1430   Post-Procedure Length (cm) 10 cm 07/15/20 1430   Post-Procedure Width (cm) 7.5 cm 07/15/20 1430   Post-Procedure Depth (cm) 0.2 cm 07/15/20 1430   Post-Procedure Surface Area (cm^2) 75 cm^2 07/15/20 1430   Post-Procedure Volume (cm^3) 15 cm^3 07/15/20 1430   Wound Healing % -96 07/15/20 1430   Wound Bed Granulation (%) 40 % 06/12/20 0945   Wound Bed Slough (%) 40 % 07/15/20 1430   Wound Bed Granulation (%) - Post-Procedure 80 % 06/12/20 0945   Wound Bed Slough % - (Post-Procedure) 20 % 06/12/20 0945   Tunneling (cm) 0 cm 07/15/20 1430   Undermining (cm) 0 cm 07/15/20 1430   Wound Odor None 07/15/20 1430   Pulses 2+;Left;DP 06/05/20 0930   Exposed Structures None 07/15/20 1430       Wound 06/05/20 Full Thickness Wound Leg Right R anterolateral LE (Active)   Wound Image    07/15/20 1430   Site Assessment Red;[place] 07/15/20 1430   Periwound Assessment Edema;Fragile 07/15/20 1430   Margins Attached edges 07/15/20 1430   Closure Secondary intention 07/15/20 1430   Drainage Amount Moderate 07/15/20 1430   Drainage Description Serosanguineous 07/15/20 1430   Treatments Cleansed;Topical Lidocaine;Provider debridement 07/15/20 1430   Wound Cleansing Foam Cleanser/Washcloth 07/15/20 1430   Periwound Protectant Skin Moisturizer;TAC Ointment;Stoma Powder;Barrier Paste 07/15/20 1430   Dressing Cleansing/Solutions Not Applicable 07/15/20 1430   Dressing Options Hydrofera Blue Ready;Absorbent Abdominal Pad;Compression Wrap Two Layer 07/15/20 1430   Dressing Changed Changed 07/15/20 1430   Dressing Status Clean;Dry;Intact 07/15/20 1430   Dressing Change/Treatment Frequency  Every 72 hrs, and As Needed 07/15/20 1430   Non-staged Wound Description Full thickness 07/15/20 1430   Wound Length (cm) 4.3 cm 07/15/20 1430   Wound Width (cm) 3 cm 07/15/20 1430   Wound Depth (cm) 0.1 cm 07/15/20 1430   Wound Surface Area (cm^2) 12.9 cm^2 07/15/20 1430   Wound Volume (cm^3) 1.29 cm^3 07/15/20 1430   Post-Procedure Length (cm) 4.5 cm 07/15/20 1430   Post-Procedure Width (cm) 3 cm 07/15/20 1430   Post-Procedure Depth (cm) 0.1 cm 07/15/20 1430   Post-Procedure Surface Area (cm^2) 13.5 cm^2 07/15/20 1430   Post-Procedure Volume (cm^3) 1.35 cm^3 07/15/20 1430   Wound Healing % 74 07/15/20 1430   Wound Bed Granulation (%) 20 % 06/12/20 0945   Wound Bed Slough (%) 80 % 06/12/20 0945   Wound Bed Granulation (%) - Post-Procedure 60 % 06/12/20 0945   Wound Bed Slough % - (Post-Procedure) 40 % 06/12/20 0945   Tunneling (cm) 0 cm 07/15/20 1430   Undermining (cm) 0 cm 07/15/20 1430   Wound Odor None 07/15/20 1430   Pulses 2+;DP;Right 06/05/20 0930   Exposed Structures None 07/15/20 1430                    PROCEDURE: Excisional debridement of left lower extremity wounds x 3  -2% viscous lidocaine applied topically to wound beds for approximately 10 minutes prior to debridement  -Curette used to excise thick layer of slough from wound beds.  Excisional debridement was performed to remove devitalized tissue until healthy, bleeding tissue was visualized. Total area debrided to 23.86 cm².  Tissue debrided into the subcutaneous layer.    -Bleeding controlled with manual pressure.    -Wounds cleansed with normal saline  -Wound culture collected, wound care completed by  RN, refer to flowsheet  -Patient tolerated the procedure well, without c/o pain or discomfort.         Pertinent Labs and Diagnostics:    Labs:     A1c:   Lab Results   Component Value Date/Time    HBA1C 5.7 (H) 01/23/2020 11:22 AM          VASCULAR STUDIES: BHUPINDER 12/27/2019   Right.    Doppler waveform of the common femoral artery is of high amplitude  and    triphasic.    Doppler waveforms at the ankle are brisk and triphasic.    Ankle-brachial index is normal.       Left.    Could not perform ankle-brachial index due to large blisters/ulcers.   Normal toe-brachial index: 0.94   Doppler waveform of the common femoral artery is of high amplitude and    triphasic.    Biphasic waveforms seen at the ankle.     WOUND CULTURE:    DATE: 6/19/2020-culture collected in clinic  Culture Result  Abnormal     Beta Hemolytic Streptococcus group A   Heavy growth     Culture Result  Abnormal     Staphylococcus aureus   Moderate growth     Culture Result  Abnormal     Serratia marcescens   Moderate growth     Culture Result  Abnormal     Proteus mirabilis   Moderate growth     Culture Result  Abnormal     Pseudomonas species   Light growth   unable to isolate for sensitivity            ASSESSMENT AND PLAN:     1. Skin ulcer of left lower leg with fat layer exposed (HCC)  Comment: Patient has history of recurring ulcerations to left lower extremity.  Has undergone ablation procedures by Dr. White.  Discharged from [facility] in March 2020 due to healing, returned approximately 3 weeks later much worse.  Hospitalized from 4/16 until 5/21/2020 underwent multiple surgical debridements.    7/15/2020: Hyper granulation of left lateral lower leg significantly improved with use of white foam.  Thin layer of slough is built up over entire wound bed.  -Excisional debridement of wound in clinic today, medically necessary to promote wound healing.  -Patient to be seen in clinic 1 time a week for assessment and debridement and wound VAC dressing change.  Patient to be seen by home health twice a week for wound VAC change and assessment.        Wound care: Wound VAC with white foam to wound bed, black button, 2 layer compression wrap to left lower extremity wound.  Other wounds with Hydrofera Blue and 2 layer compression wrap    2. Skin ulcer of multiple sites of right lower extremity with fat  layer exposed (HCC)  Comments: First observed in clinic on 6/5/2020, on initial evaluation. Patient is not certain when or how they occurred.  There is no documentation of wounds to this leg during recent hospitalization, so presumably they occurred after he went home.    7/15/2020    wound beds are again covered with thick layer of slough.   -Excisional debridement of wound in clinic today, medically necessary to promote wound healing.      -See above        3. Chronic venous stasis  Comments: Hemosiderin staining, brawny edema, scarring- findings consistent with CVI.  Pt established at [facility], underwent ablation procedures in 2019.     -Consider referral back to vascular if wounds failed to progress or deteriorate.    4. Acquired deformity of left ankle and foot  Comments: Charcot deformity of foot and ankle.  Very little ROM of ankle.  Patient was referred to orthopedics previously, however did not make appointment.    -Patient will need to follow-up with orthopedics once wounds are healed.    5.  Wound infection     7/15/2020: Wound culture collected on 6/19/2020+ for multiple organisms.  Patient was started on Augmentin and Bactrim DS and referred to ID.  -Seen by ID on 7/7, recommendations were to complete antibiotics, follow-up as needed          PATIENT EDUCATION  - Pt advised to go to ER for any increased redness, swelling, drainage or odor, or if he develops fever, chills, nausea or vomiting.        Please note that this note may have been created using voice recognition software. I have worked with technical experts from [facility] to optimize the interface.  I have made every reasonable attempt to correct obvious errors, but there may be errors of grammar and possibly content that I did not discover before finalizing the note.    N

## 2020-07-22 ENCOUNTER — OFFICE VISIT (OUTPATIENT)
Dept: WOUND CARE | Facility: MEDICAL CENTER | Age: 65
End: 2020-07-22
Attending: NURSE PRACTITIONER
Payer: MEDICARE

## 2020-07-22 VITALS
RESPIRATION RATE: 18 BRPM | HEART RATE: 59 BPM | OXYGEN SATURATION: 97 % | SYSTOLIC BLOOD PRESSURE: 123 MMHG | DIASTOLIC BLOOD PRESSURE: 85 MMHG | TEMPERATURE: 97.9 F

## 2020-07-22 DIAGNOSIS — L97.922 SKIN ULCER OF LEFT LOWER LEG WITH FAT LAYER EXPOSED (HCC): ICD-10-CM

## 2020-07-22 DIAGNOSIS — T14.8XXA WOUND INFECTION: ICD-10-CM

## 2020-07-22 DIAGNOSIS — I87.8 CHRONIC VENOUS STASIS: ICD-10-CM

## 2020-07-22 DIAGNOSIS — M21.962 ACQUIRED DEFORMITY OF LEFT ANKLE AND FOOT: ICD-10-CM

## 2020-07-22 DIAGNOSIS — L08.9 WOUND INFECTION: ICD-10-CM

## 2020-07-22 DIAGNOSIS — L97.912 SKIN ULCER OF MULTIPLE SITES OF RIGHT LOWER EXTREMITY WITH FAT LAYER EXPOSED (HCC): ICD-10-CM

## 2020-07-22 PROCEDURE — 11042 DBRDMT SUBQ TIS 1ST 20SQCM/<: CPT | Performed by: NURSE PRACTITIONER

## 2020-07-22 PROCEDURE — 11042 DBRDMT SUBQ TIS 1ST 20SQCM/<: CPT

## 2020-07-22 PROCEDURE — 97606 NEG PRS WND THER DME>50 SQCM: CPT

## 2020-07-22 PROCEDURE — 11045 DBRDMT SUBQ TISS EACH ADDL: CPT

## 2020-07-22 PROCEDURE — 11045 DBRDMT SUBQ TISS EACH ADDL: CPT | Performed by: NURSE PRACTITIONER

## 2020-07-22 ASSESSMENT — ENCOUNTER SYMPTOMS
SHORTNESS OF BREATH: 0
CLAUDICATION: 0
DIARRHEA: 0
FEVER: 0
NAUSEA: 0
COUGH: 0
DEPRESSION: 1
CHILLS: 0
MYALGIAS: 0
CONSTIPATION: 0
NERVOUS/ANXIOUS: 0
VOMITING: 0

## 2020-07-22 NOTE — PROGRESS NOTES
Provider Encounter- Full Thickness wound    HISTORY OF PRESENT ILLNESS  Wound History:    START OF CARE IN CLINIC: 5/20/2020               REFERRING PROVIDER: KEV Keith                 WOUND ETIOLOGY: venous,  likely mixed etiology              LOCATION: Left lower leg, multiple wounds                                   Right lower leg, multiple wounds-first observed in clinic on initial evaluation, 6/5/2020                HISTORY: Patient is very well-known to this clinic from previous treatment of wounds to his left lower extremity.  He was discharged from the clinic in March 2020 due to full resolution of his wound.  He returned to the clinic approximately 3 weeks later, on 4/16, with cellulitis, edema, and large open wounds to his left lower leg, and was sent directly to [facility] emergency room.  He was admitted for IV antibiotics and surgical intervention.  On 4/22/2020, he underwent an I&D of his left lower extremity, fasciotomy, and VAC placement.  In the following weeks he underwent surgery 3 more times for irrigation and debridement.  He was discharged home on 5/21, on the VAC, with home health and a referral to [facility].       Pertinent Medical History:  Anxiety, Charcot's joint of left foot, non-diabetic (3/21/2016), Chronic congestive heart failure (HCC) (11/16/2017), Hepatitis C, chronic (HCC), Hypertension, Migraine, Polysubstance abuse (HCC) (3/8/2018), Tobacco use (4/18/2016), Ulcer of left lower extremity with necrosis of muscle (HCC) (3/21/2016), and Venous stasis ulcer ([facility]) (2017).                ETIOLOGY HISTORY:  Vascular Surgeon: Dr. White. Compression Circ-aid. Varicose Veins none visible     TOBACCO USE: Patient denies; patient also denies alcohol and recreational drug use    Patient's problem list, allergies, and current medications reviewed and updated in Epic    Interval History:  6/5/2020: Initial clinic visit with KEV Brantley.  Patient returns to clinic after a  "long hospitalization.  The VAC was held by home health due to severe maceration and deterioration of wound beds.  He also presents today with new wounds to his right lower extremity.  He states he does not know how or when these occurred, states, \"they just popped up\".  He is uncertain whether or not he has follow-up appointments with his surgeon.  He does not know what medications he is on.      6/12/2020 : Clinic visit with KEV Brantley.  Goran states he is feeling well today,denies fevers, chills, nausea, vomiting, cough or shortness of breath.  Condition of his periwound skin is significantly improved.  I would like to restart VAC, however he did not bring the VAC with him, and that has already been on for 2-1/2 weeks.  Will likely need to be reordered.  [facility] continues to see him several times per week for dressing changes.  He is tolerating compression without any difficulty.    6/19/2020 : Clinic visit with KEV Brantley.   Goran states he is feeling well today,denies fevers, chills, nausea, vomiting, cough or shortness of breath.  He brought his VAC with him today.  However,  too much slough on wound beds to consider re-applying.  [facility] continues to see him 2 times per week.  I have asked him to bring the VAC with him again next week, and will hopefully place it at that time.  Per patient, dressings are saturated between dressing changes.    6/26/2020 : Clinic visit with KEV Brantley.   Goran states he is feeling well today,denies fevers, chills, nausea, vomiting, cough or shortness of breath.  He brought his VAC with him again today, however we are not able to put it on as he is still having twice drainage.  Sensitivities from culture still pending.  However I did start him on Augmentin today which we are able to obtain from [facility] prior to his leaving the clinic today and had him take one before he left.  [facility] is been seen 2 times per week in between clinic visits.  " We will try and get them to see him over the weekend to do an additional dressing change.  Will adjust antibiotics once sensitivities are known.  Hopefully start VAC next clinic visit.    7/2/2020 : Clinic visit with KEV Brantley.    Goran states he is feeling well today,denies fevers, chills, nausea, vomiting, cough or shortness of breath.  His VAC is here in the clinic, however he does not have another home health appointment until Monday, 4 days from now.  He has been taking Augmentin as prescribed last week.  I informed him that I added Bactrim DS after I got sensitivity results, and that it should be waiting for him at his pharmacy.    7/9/2020: Clinic visit with KEV Luna. Patient states that they are feeling well today.  Patient denies fever, chills, nausea, vomiting, lightheadedness, dizziness, shortness of breath and chest pain.  All wound beds debrided in clinic today patient's wounds continue to progress beefy red granulation tissue to left lower extremity lateral wound tendon is being covered with granulation tissue continue with wound VAC and current plan of care.    7/15/2020 : Clinic visit with KEV Brantley.  Goran states he is feeling well today,denies fevers, chills, nausea, vomiting, cough or shortness of breath.  Home health continues to see him in between clinic visits.  He states he has not been smoking, drinking or using any street drugs.    7/22/2020 : Clinic visit with KEV Brantley.   Goran states he is feeling well today,denies fevers, chills, nausea, vomiting, cough or shortness of breath.  Home health continues to see him in between clinic visits.          REVIEW OF SYSTEMS:   Review of Systems   Constitutional: Negative for chills and fever.        States he is eating well, appetite normal   Respiratory: Negative for cough and shortness of breath.    Cardiovascular: Positive for leg swelling. Negative for chest pain and claudication.        Chronic  swelling in legs, for several years   Gastrointestinal: Negative for constipation, diarrhea, nausea and vomiting.   Genitourinary: Negative for dysuria.   Musculoskeletal: Positive for joint pain. Negative for myalgias.        Chronic   Psychiatric/Behavioral: Positive for depression. The patient is not nervous/anxious.        PHYSICAL EXAMINATION:   /85   Pulse (!) 59   Temp 36.6 °C (97.9 °F) (Temporal)   Resp 18   SpO2 97%     Physical Exam   Constitutional: He is oriented to person, place, and time and well-developed, well-nourished, and in no distress.   Disheveled unkept   HENT:   Head: Normocephalic and atraumatic.   Eyes: Pupils are equal, round, and reactive to light. Conjunctivae and EOM are normal.   Neck: Neck supple.   Cardiovascular: Intact distal pulses.   Pulmonary/Chest: Effort normal.   Musculoskeletal: Normal range of motion.         General: Deformity and edema present.      Comments: Left charcot foot and ankle  3+pitting edema to bilateral LE   Neurological: He is alert and oriented to person, place, and time.   Skin: Skin is warm. There is erythema.   Large full-thickness wounds to bilateral lower extremities  Further wound flowsheet and photos   Psychiatric: Affect normal.   Patient lacks insight into his own healthcare issues, defers to his daughter to keep track of his medications and appointments       WOUND ASSESSMENT                                                             Wound 06/05/20 Full Thickness Wound Leg Left LLE anterolateral (Active)   Wound Image    07/22/20 1439   Site Assessment Red;Yellow 07/22/20 1439   Periwound Assessment Edema;Fragile 07/22/20 1439   Margins Attached edges 07/22/20 1439   Closure Secondary intention 07/15/20 1430   Drainage Amount Copious 07/22/20 1439   Drainage Description Serosanguineous 07/22/20 1439   Treatments Cleansed;Topical Lidocaine;Provider debridement 07/22/20 1439   Wound Cleansing Foam Cleanser/Washcloth 07/22/20 1439    Periwound Protectant Skin Moisturizer;TAC Ointment;Drape;Skin Protectant Wipes to Periwound 07/22/20 1439   Dressing Cleansing/Solutions Not Applicable 07/15/20 1430   Dressing Options Wound Vac;Compression Wrap Two Layer 07/22/20 1439   Dressing Changed Changed 07/15/20 1430   Dressing Status Clean;Dry;Intact 07/15/20 1430   Dressing Change/Treatment Frequency Monday, Wednesday, Friday, and As Needed 07/15/20 1430   Non-staged Wound Description Full thickness 07/22/20 1439   Wound Length (cm) 16.5 cm 07/22/20 1439   Wound Width (cm) 6.4 cm 07/22/20 1439   Wound Depth (cm) 0.1 cm 07/22/20 1439   Wound Surface Area (cm^2) 105.6 cm^2 07/22/20 1439   Wound Volume (cm^3) 10.56 cm^3 07/22/20 1439   Post-Procedure Length (cm) 17 cm 07/22/20 1439   Post-Procedure Width (cm) 6.5 cm 07/22/20 1439   Post-Procedure Depth (cm) 0.3 cm 07/22/20 1439   Post-Procedure Surface Area (cm^2) 110.5 cm^2 07/22/20 1439   Post-Procedure Volume (cm^3) 33.15 cm^3 07/22/20 1439   Wound Healing % 81 07/22/20 1439   Wound Bed Granulation (%) 100 % 07/15/20 1430   Wound Bed Slough (%) 35 % 07/22/20 1439   Wound Bed Granulation (%) - Post-Procedure 100 % 06/12/20 0945   Wound Bed Slough % - (Post-Procedure) 5 % 06/05/20 0930   Tunneling (cm) 0 cm 07/15/20 1430   Undermining (cm) 0 cm 07/15/20 1430   Wound Odor Mild 07/22/20 1439   Pulses Left;2+;DP 06/05/20 0930   Exposed Structures None 07/22/20 1439       Wound 06/05/20 Full Thickness Wound Ankle LLE distal posteriomedial (Active)   Wound Image    07/22/20 1439   Site Assessment Red;Yellow 07/22/20 1439   Periwound Assessment Edema;Fragile 07/22/20 1439   Margins Attached edges 07/22/20 1439   Closure Secondary intention 07/15/20 1430   Drainage Amount Copious 07/22/20 1439   Drainage Description Serosanguineous;Green 07/22/20 1439   Treatments Cleansed;Topical Lidocaine;Provider debridement 07/22/20 1439   Wound Cleansing Foam Cleanser/Washcloth 07/22/20 1439   Periwound Protectant Skin  Moisturizer;TAC Ointment;Barrier Paste 07/22/20 1439   Dressing Cleansing/Solutions Not Applicable 07/15/20 1430   Dressing Options Hydrofera Blue Ready;Absorbent Abdominal Pad;Compression Wrap Two Layer 07/22/20 1439   Dressing Changed Changed 07/15/20 1430   Dressing Status Clean;Dry;Intact 07/15/20 1430   Dressing Change/Treatment Frequency Monday, Wednesday, Friday, and As Needed 07/15/20 1430   Non-staged Wound Description Full thickness 07/22/20 1439   Wound Length (cm) 1.5 cm 07/22/20 1439   Wound Width (cm) 6 cm 07/22/20 1439   Wound Depth (cm) 0.2 cm 07/22/20 1439   Wound Surface Area (cm^2) 9 cm^2 07/22/20 1439   Wound Volume (cm^3) 1.8 cm^3 07/22/20 1439   Post-Procedure Length (cm) 1.6 cm 07/22/20 1439   Post-Procedure Width (cm) 6 cm 07/22/20 1439   Post-Procedure Depth (cm) 0.2 cm 07/22/20 1439   Post-Procedure Surface Area (cm^2) 9.6 cm^2 07/22/20 1439   Post-Procedure Volume (cm^3) 1.92 cm^3 07/22/20 1439   Wound Healing % 69 07/22/20 1439   Wound Bed Granulation (%) 65 % 06/12/20 0945   Wound Bed Slough (%) 50 % 07/22/20 1439   Wound Bed Granulation (%) - Post-Procedure 80 % 06/12/20 0945   Wound Bed Slough % - (Post-Procedure) 20 % 06/12/20 0945   Tunneling (cm) 0 cm 07/15/20 1430   Undermining (cm) 0 cm 07/15/20 1430   Wound Odor Mild 07/22/20 1439   Pulses Left;2+;DP 06/05/20 0930   Exposed Structures None 07/22/20 1439       Wound 06/05/20 Full Thickness Wound Leg Left LLE posteromedial (Active)   Wound Image    07/22/20 1439   Site Assessment Red;Yellow 07/22/20 1439   Periwound Assessment Edema;Fragile 07/22/20 1439   Margins Attached edges 07/22/20 1439   Closure Secondary intention 07/15/20 1430   Drainage Amount Copious 07/22/20 1439   Drainage Description Serosanguineous;Green 07/22/20 1439   Treatments Cleansed;Topical Lidocaine;Provider debridement 07/22/20 1439   Wound Cleansing Foam Cleanser/Washcloth 07/22/20 1439   Periwound Protectant Barrier Paste;TAC Ointment;Skin Moisturizer  07/22/20 1439   Dressing Cleansing/Solutions Not Applicable 07/15/20 1430   Dressing Changed Changed 07/15/20 1430   Dressing Status Clean;Dry;Intact 07/15/20 1430   Dressing Change/Treatment Frequency Monday, Wednesday, Friday, and As Needed 07/15/20 1430   Non-staged Wound Description Full thickness 07/15/20 1430   Wound Length (cm) 9.9 cm 07/15/20 1430   Wound Width (cm) 7.5 cm 07/15/20 1430   Wound Depth (cm) 0.2 cm 07/15/20 1430   Wound Surface Area (cm^2) 74.25 cm^2 07/15/20 1430   Wound Volume (cm^3) 14.85 cm^3 07/15/20 1430   Post-Procedure Length (cm) 10 cm 07/15/20 1430   Post-Procedure Width (cm) 7.5 cm 07/15/20 1430   Post-Procedure Depth (cm) 0.2 cm 07/15/20 1430   Post-Procedure Surface Area (cm^2) 75 cm^2 07/15/20 1430   Post-Procedure Volume (cm^3) 15 cm^3 07/15/20 1430   Wound Healing % -96 07/15/20 1430   Wound Bed Granulation (%) 40 % 06/12/20 0945   Wound Bed Slough (%) 40 % 07/15/20 1430   Wound Bed Granulation (%) - Post-Procedure 80 % 06/12/20 0945   Wound Bed Slough % - (Post-Procedure) 20 % 06/12/20 0945   Tunneling (cm) 0 cm 07/15/20 1430   Undermining (cm) 0 cm 07/15/20 1430   Wound Odor None 07/15/20 1430   Pulses 2+;Left;DP 06/05/20 0930   Exposed Structures None 07/15/20 1430       Wound 06/05/20 Full Thickness Wound Leg Right R anterolateral LE (Active)   Wound Image    07/15/20 1430   Site Assessment Red;Wilkinson 07/15/20 1430   Periwound Assessment Edema;Fragile 07/15/20 1430   Margins Attached edges 07/15/20 1430   Closure Secondary intention 07/15/20 1430   Drainage Amount Moderate 07/15/20 1430   Drainage Description Serosanguineous 07/15/20 1430   Treatments Cleansed;Topical Lidocaine;Provider debridement 07/15/20 1430   Wound Cleansing Foam Cleanser/Washcloth 07/15/20 1430   Periwound Protectant Skin Moisturizer;TAC Ointment;Stoma Powder;Barrier Paste 07/15/20 1430   Dressing Cleansing/Solutions Not Applicable 07/15/20 1430   Dressing Changed Changed 07/15/20 1430   Dressing Status  Clean;Dry;Intact 07/15/20 1430   Dressing Change/Treatment Frequency Every 72 hrs, and As Needed 07/15/20 1430   Non-staged Wound Description Full thickness 07/15/20 1430   Wound Length (cm) 4.3 cm 07/15/20 1430   Wound Width (cm) 3 cm 07/15/20 1430   Wound Depth (cm) 0.1 cm 07/15/20 1430   Wound Surface Area (cm^2) 12.9 cm^2 07/15/20 1430   Wound Volume (cm^3) 1.29 cm^3 07/15/20 1430   Post-Procedure Length (cm) 4.5 cm 07/15/20 1430   Post-Procedure Width (cm) 3 cm 07/15/20 1430   Post-Procedure Depth (cm) 0.1 cm 07/15/20 1430   Post-Procedure Surface Area (cm^2) 13.5 cm^2 07/15/20 1430   Post-Procedure Volume (cm^3) 1.35 cm^3 07/15/20 1430   Wound Healing % 74 07/15/20 1430   Wound Bed Granulation (%) 20 % 06/12/20 0945   Wound Bed Slough (%) 80 % 06/12/20 0945   Wound Bed Granulation (%) - Post-Procedure 60 % 06/12/20 0945   Wound Bed Slough % - (Post-Procedure) 40 % 06/12/20 0945   Tunneling (cm) 0 cm 07/15/20 1430   Undermining (cm) 0 cm 07/15/20 1430   Wound Odor None 07/15/20 1430   Pulses 2+;DP;Right 06/05/20 0930   Exposed Structures None 07/15/20 1430                            PROCEDURE: Excisional debridement of left lower extremity wounds x 3  -2% viscous lidocaine applied topically to wound beds for approximately 10 minutes prior to debridement  -Curette used to excise thick layer of slough from wound beds.  Excisional debridement was performed to remove devitalized tissue until healthy, bleeding tissue was visualized. Total area debrided to 195.1 cm².  Tissue debrided into the subcutaneous layer.    -Bleeding controlled with manual pressure.    -Wounds cleansed with normal saline  -Wound culture collected, wound care completed by  RN, refer to flowsheet  -Patient tolerated the procedure well, without c/o pain or discomfort.     PROCEDURE: Visual debridement of right lower extremity wound  -2% viscous lidocaine applied topically to wound bed for approximately 5 minutes prior to debridement  -Curette  used to debride wound bed.  Excisional debridement was performed to remove devitalized tissue until healthy, bleeding tissue was visualized.   Entire surface of wound, 13.5 cm2 debrided.  Tissue debrided into the subcutaneous layer.    -Bleeding controlled with manual pressure.    -Wound care completed by wound RN, refer to flowsheet  -Patient tolerated the procedure well, without c/o pain or discomfort.     Pertinent Labs and Diagnostics:    Labs:     A1c:   Lab Results   Component Value Date/Time    HBA1C 5.7 (H) 01/23/2020 11:22 AM          VASCULAR STUDIES: BHUPINDER 12/27/2019   Right.    Doppler waveform of the common femoral artery is of high amplitude and    triphasic.    Doppler waveforms at the ankle are brisk and triphasic.    Ankle-brachial index is normal.       Left.    Could not perform ankle-brachial index due to large blisters/ulcers.   Normal toe-brachial index: 0.94   Doppler waveform of the common femoral artery is of high amplitude and    triphasic.    Biphasic waveforms seen at the ankle.     WOUND CULTURE:    DATE: 6/19/2020-culture collected in clinic  Culture Result  Abnormal     Beta Hemolytic Streptococcus group A   Heavy growth     Culture Result  Abnormal     Staphylococcus aureus   Moderate growth     Culture Result  Abnormal     Serratia marcescens   Moderate growth     Culture Result  Abnormal     Proteus mirabilis   Moderate growth     Culture Result  Abnormal     Pseudomonas species   Light growth   unable to isolate for sensitivity            ASSESSMENT AND PLAN:     1. Skin ulcer of left lower leg with fat layer exposed (HCC)  Comment: Patient has history of recurring ulcerations to left lower extremity.  Has undergone ablation procedures by Dr. White.  Discharged from [facility] in March 2020 due to healing, returned approximately 3 weeks later much worse.  Hospitalized from 4/16 until 5/21/2020 underwent multiple surgical debridements.    7/22/2020: Hyper granulation of left lateral  lower leg nearly resolved.  We will resume black foam today.  We can always restart white foam if hyper granulation becomes an issue again.  -Excisional debridement of wound in clinic today, medically necessary to promote wound healing.  -Patient to be seen in clinic 1 time a week for assessment and debridement and wound VAC dressing change.  Patient to be seen by home health twice a week for wound VAC change and assessment.        Wound care: Wound VAC with lack foam to wound bed, black button, 2 layer compression wrap to left lower extremity wound.  Other wounds with Hydrofera Blue and 2 layer compression wrap    2. Skin ulcer of multiple sites of right lower extremity with fat layer exposed (HCC)  Comments: First observed in clinic on 6/5/2020, on initial evaluation. Patient is not certain when or how they occurred.  There is no documentation of wounds to this leg during recent hospitalization, so presumably they occurred after he went home.    7/22/2020    wound bed again covered with thick layer of slough.   -Excisional debridement of wound in clinic today, medically necessary to promote wound healing.      -See above        3. Chronic venous stasis  Comments: Hemosiderin staining, brawny edema, scarring- findings consistent with CVI.  Pt established at [facility], underwent ablation procedures in 2019.     -Consider referral back to vascular if wounds failed to progress or deteriorate.    4. Acquired deformity of left ankle and foot  Comments: Charcot deformity of foot and ankle.  Very little ROM of ankle.  Patient was referred to orthopedics previously, however did not make appointment.    -Patient will need to follow-up with orthopedics once wounds are healed.    5.  Wound infection     7/22/2020: Wound culture collected on 6/19/2020+ for multiple organisms.  Patient was started on Augmentin and Bactrim DS and referred to ID.  -Seen by ID on 7/7, recommendations were to complete antibiotics, follow-up as  needed          PATIENT EDUCATION  - Pt advised to go to ER for any increased redness, swelling, drainage or odor, or if he develops fever, chills, nausea or vomiting.        Please note that this note may have been created using voice recognition software. I have worked with technical experts from [facility] to optimize the interface.  I have made every reasonable attempt to correct obvious errors, but there may be errors of grammar and possibly content that I did not discover before finalizing the note.    N

## 2020-07-22 NOTE — LETTER
2020    To: [facility]  Re:Goran Chavez,  1955    Wound Care Order: All wounds EXCEPT LLE lateral wound.     · Remove wraps and old dressings from bilateral legs.   · Wash both legs with a washcloth and soapy water.  · Rinse wounds with normal saline or wound cleanser.  · Pat dry with gauze.  · Apply skin moisturizer to intact skin.  · Apply zinc barrier paste to periwound  · Apply fenestrated Hydrofera Blue ready to all wounds- Make sure the shiny side with the writing is NOT against the wound bed.  · Cover all wounds with abdominal pad or nonadhesive foam  · Apply 2 layer compression wrap to both legs.    To LLE lateral wound:  Remove wound vac. Wash legs as stated above.   Cleanse wound with saline and pat dry  Apply no sting skin prep to sandrine wound  Apply vac drape to sandrine-wound. Ensure that vac drape covers any area where foam may come into contact with skin.   Cover wound bed with black foam. DO NOT ALLOW FOAM TO TOUCH INTACT SKIN  Cover wound vac foam with vac drape.   Cut quarter sized hole into vac drape to accommodate trac pad.  Apply trac pad over hole in vac drape.   Connect vac tubing to wound vac machine.  Resume negative pressure wound therapy at 125 mmHg continuous.  Apply 2 layer compression wrap from base of toes to 2 inches below back of knee to LLE.     Home health to see patient twice a week, patient to present to [facility] weekly.   Please call [facility] with any questions or concerns.      ___________________________  KEV Mead

## 2020-07-29 ENCOUNTER — HOSPITAL ENCOUNTER (OUTPATIENT)
Facility: MEDICAL CENTER | Age: 65
End: 2020-07-29
Attending: NURSE PRACTITIONER
Payer: MEDICARE

## 2020-07-29 ENCOUNTER — OFFICE VISIT (OUTPATIENT)
Dept: WOUND CARE | Facility: MEDICAL CENTER | Age: 65
End: 2020-07-29
Attending: NURSE PRACTITIONER
Payer: MEDICARE

## 2020-07-29 VITALS
RESPIRATION RATE: 16 BRPM | OXYGEN SATURATION: 98 % | HEART RATE: 64 BPM | SYSTOLIC BLOOD PRESSURE: 123 MMHG | TEMPERATURE: 97.6 F | DIASTOLIC BLOOD PRESSURE: 75 MMHG

## 2020-07-29 DIAGNOSIS — I87.8 CHRONIC VENOUS STASIS: ICD-10-CM

## 2020-07-29 DIAGNOSIS — L53.9 ERYTHEMA: ICD-10-CM

## 2020-07-29 DIAGNOSIS — L97.922 SKIN ULCER OF LEFT LOWER LEG WITH FAT LAYER EXPOSED (HCC): Primary | ICD-10-CM

## 2020-07-29 DIAGNOSIS — T14.8XXA WOUND INFECTION: ICD-10-CM

## 2020-07-29 DIAGNOSIS — L97.912 SKIN ULCER OF MULTIPLE SITES OF RIGHT LOWER EXTREMITY WITH FAT LAYER EXPOSED (HCC): ICD-10-CM

## 2020-07-29 DIAGNOSIS — L08.9 WOUND INFECTION: ICD-10-CM

## 2020-07-29 DIAGNOSIS — M21.962 ACQUIRED DEFORMITY OF LEFT ANKLE AND FOOT: ICD-10-CM

## 2020-07-29 PROCEDURE — 11045 DBRDMT SUBQ TISS EACH ADDL: CPT

## 2020-07-29 PROCEDURE — 11045 DBRDMT SUBQ TISS EACH ADDL: CPT | Performed by: NURSE PRACTITIONER

## 2020-07-29 PROCEDURE — 87186 SC STD MICRODIL/AGAR DIL: CPT | Mod: 91

## 2020-07-29 PROCEDURE — 87205 SMEAR GRAM STAIN: CPT

## 2020-07-29 PROCEDURE — 87077 CULTURE AEROBIC IDENTIFY: CPT | Mod: 91

## 2020-07-29 PROCEDURE — 87070 CULTURE OTHR SPECIMN AEROBIC: CPT

## 2020-07-29 PROCEDURE — 11042 DBRDMT SUBQ TIS 1ST 20SQCM/<: CPT | Performed by: NURSE PRACTITIONER

## 2020-07-29 PROCEDURE — 11042 DBRDMT SUBQ TIS 1ST 20SQCM/<: CPT

## 2020-07-29 ASSESSMENT — ENCOUNTER SYMPTOMS
FEVER: 0
CHILLS: 0
DEPRESSION: 1
DIARRHEA: 0
SHORTNESS OF BREATH: 0
CONSTIPATION: 0
CLAUDICATION: 0
MYALGIAS: 0
COUGH: 0
VOMITING: 0
NAUSEA: 0
NERVOUS/ANXIOUS: 0

## 2020-07-29 NOTE — PATIENT INSTRUCTIONS
-Keep dressings clean, dry and covered while bathing. Only change dressings if they become over saturated, soiled or fall off.     -Avoid prolonged standing or sitting without elevating your legs.    -Remove your compression garments if you have severe pain, severe swelling, numbness, color change, or temperature change in your toes. If you need to remove your compression garments, do so by unrolling them. Do not cut the compression garments off, this is to prevent cutting yourself on accident.    -Should you experience any significant changes in your wound(s), such as infection (redness, swelling, localized heat, increased pain, fever > 101 F, chills) or have any questions regarding your home care instructions, please contact the wound center at [phone]. If after hours, contact your primary care physician or go to the hospital emergency room.

## 2020-07-29 NOTE — PROGRESS NOTES
Provider Encounter- Full Thickness wound    HISTORY OF PRESENT ILLNESS  Wound History:    START OF CARE IN CLINIC: 5/20/2020               REFERRING PROVIDER: KEV Keith                 WOUND ETIOLOGY: venous,  likely mixed etiology              LOCATION: Left lower leg, multiple wounds                                   Right lower leg, multiple wounds-first observed in clinic on initial evaluation, 6/5/2020                HISTORY: Patient is very well-known to this clinic from previous treatment of wounds to his left lower extremity.  He was discharged from the clinic in March 2020 due to full resolution of his wound.  He returned to the clinic approximately 3 weeks later, on 4/16, with cellulitis, edema, and large open wounds to his left lower leg, and was sent directly to [facility] emergency room.  He was admitted for IV antibiotics and surgical intervention.  On 4/22/2020, he underwent an I&D of his left lower extremity, fasciotomy, and VAC placement.  In the following weeks he underwent surgery 3 more times for irrigation and debridement.  He was discharged home on 5/21, on the VAC, with home health and a referral to [facility].       Pertinent Medical History:  Anxiety, Charcot's joint of left foot, non-diabetic (3/21/2016), Chronic congestive heart failure (HCC) (11/16/2017), Hepatitis C, chronic (HCC), Hypertension, Migraine, Polysubstance abuse (HCC) (3/8/2018), Tobacco use (4/18/2016), Ulcer of left lower extremity with necrosis of muscle (HCC) (3/21/2016), and Venous stasis ulcer ([facility]) (2017).                ETIOLOGY HISTORY:  Vascular Surgeon: Dr. White. Compression Circ-aid. Varicose Veins none visible     TOBACCO USE: Patient denies; patient also denies alcohol and recreational drug use    Patient's problem list, allergies, and current medications reviewed and updated in Epic    Interval History:  6/5/2020: Initial clinic visit with KEV Brantley.  Patient returns to clinic after a  "long hospitalization.  The VAC was held by home health due to severe maceration and deterioration of wound beds.  He also presents today with new wounds to his right lower extremity.  He states he does not know how or when these occurred, states, \"they just popped up\".  He is uncertain whether or not he has follow-up appointments with his surgeon.  He does not know what medications he is on.      6/12/2020 : Clinic visit with KEV Brantley.  Goran states he is feeling well today,denies fevers, chills, nausea, vomiting, cough or shortness of breath.  Condition of his periwound skin is significantly improved.  I would like to restart VAC, however he did not bring the VAC with him, and that has already been on for 2-1/2 weeks.  Will likely need to be reordered.  [facility] continues to see him several times per week for dressing changes.  He is tolerating compression without any difficulty.    6/19/2020 : Clinic visit with KEV Brantley.   Goran states he is feeling well today,denies fevers, chills, nausea, vomiting, cough or shortness of breath.  He brought his VAC with him today.  However,  too much slough on wound beds to consider re-applying.  [facility] continues to see him 2 times per week.  I have asked him to bring the VAC with him again next week, and will hopefully place it at that time.  Per patient, dressings are saturated between dressing changes.    6/26/2020 : Clinic visit with KEV Brantley.   Goran states he is feeling well today,denies fevers, chills, nausea, vomiting, cough or shortness of breath.  He brought his VAC with him again today, however we are not able to put it on as he is still having twice drainage.  Sensitivities from culture still pending.  However I did start him on Augmentin today which we are able to obtain from [facility] prior to his leaving the clinic today and had him take one before he left.  On license of [facility] is been seen 2 times per week in between clinic visits.  " We will try and get them to see him over the weekend to do an additional dressing change.  Will adjust antibiotics once sensitivities are known.  Hopefully start VAC next clinic visit.    7/2/2020 : Clinic visit with KEV Brantley.    Goran states he is feeling well today,denies fevers, chills, nausea, vomiting, cough or shortness of breath.  His VAC is here in the clinic, however he does not have another home health appointment until Monday, 4 days from now.  He has been taking Augmentin as prescribed last week.  I informed him that I added Bactrim DS after I got sensitivity results, and that it should be waiting for him at his pharmacy.    7/9/2020: Clinic visit with KEV Luna. Patient states that they are feeling well today.  Patient denies fever, chills, nausea, vomiting, lightheadedness, dizziness, shortness of breath and chest pain.  All wound beds debrided in clinic today patient's wounds continue to progress beefy red granulation tissue to left lower extremity lateral wound tendon is being covered with granulation tissue continue with wound VAC and current plan of care.    7/15/2020 : Clinic visit with KEV Brantley.  Goran states he is feeling well today,denies fevers, chills, nausea, vomiting, cough or shortness of breath.  Home health continues to see him in between clinic visits.  He states he has not been smoking, drinking or using any street drugs.    7/22/2020 : Clinic visit with KEV Brantley.   Goran states he is feeling well today,denies fevers, chills, nausea, vomiting, cough or shortness of breath.  Home health continues to see him in between clinic visits.    7/29/2020: Clinic visit with KEV Luna. Patient states that they are feeling well today.  Patient denies fever, chills, nausea, vomiting, lightheadedness, dizziness, shortness of breath and chest pain.      REVIEW OF SYSTEMS:   Review of Systems   Constitutional: Negative for chills and fever.         States he is eating well, appetite normal   Respiratory: Negative for cough and shortness of breath.    Cardiovascular: Positive for leg swelling. Negative for chest pain and claudication.        Chronic swelling in legs, for several years   Gastrointestinal: Negative for constipation, diarrhea, nausea and vomiting.   Genitourinary: Negative for dysuria.   Musculoskeletal: Positive for joint pain. Negative for myalgias.        Chronic   Psychiatric/Behavioral: Positive for depression. The patient is not nervous/anxious.        PHYSICAL EXAMINATION:   /75   Pulse 64   Temp 36.4 °C (97.6 °F) (Temporal)   Resp 16   SpO2 98%     Physical Exam   Constitutional: He is oriented to person, place, and time and well-developed, well-nourished, and in no distress.   Disheveled unkept   HENT:   Head: Normocephalic and atraumatic.   Eyes: Pupils are equal, round, and reactive to light. Conjunctivae and EOM are normal.   Neck: Neck supple.   Cardiovascular: Intact distal pulses.   Pulmonary/Chest: Effort normal.   Musculoskeletal: Normal range of motion.         General: Deformity and edema present.      Comments: Left charcot foot and ankle  1+pitting edema to bilateral LE   Neurological: He is alert and oriented to person, place, and time.   Skin: Skin is warm. There is erythema.   Large full-thickness wounds to bilateral lower extremities  Further wound flowsheet and photos   Psychiatric: Affect normal.   Patient lacks insight into his own healthcare issues, defers to his daughter to keep track of his medications and appointments       WOUND ASSESSMENT            Wound 06/05/20 Full Thickness Wound Leg Left LLE anterolateral (Active)   Wound Image    07/29/20 1450   Site Assessment Red;Yellow 07/29/20 1450   Periwound Assessment Denuded;Edema;Blanchable erythema 07/29/20 1450   Margins Attached edges 07/29/20 1450   Closure Secondary intention 07/15/20 1430   Drainage Amount Copious 07/29/20 1450   Drainage  Description Serosanguineous 07/29/20 1450   Treatments Cleansed;Topical Lidocaine;Provider debridement 07/29/20 1450   Wound Cleansing Foam Cleanser/Washcloth 07/29/20 1450   Periwound Protectant TAC Ointment;Barrier Paste 07/29/20 1450   Dressing Cleansing/Solutions Not Applicable 07/15/20 1430   Dressing Options Hydrofera Blue Ready;Absorbent Abdominal Pad;Dry Roll Gauze;Unna Boot 07/29/20 1450   Dressing Changed New 07/29/20 1450   Dressing Status Clean;Dry;Intact 07/15/20 1430   Dressing Change/Treatment Frequency Monday, Wednesday, Friday, and As Needed 07/15/20 1430   Non-staged Wound Description Full thickness 07/29/20 1450   Wound Length (cm) 15.4 cm 07/29/20 1450   Wound Width (cm) 6.9 cm 07/29/20 1450   Wound Depth (cm) 0.1 cm 07/22/20 1439   Wound Surface Area (cm^2) 106.26 cm^2 07/29/20 1450   Wound Volume (cm^3) 10.56 cm^3 07/22/20 1439   Post-Procedure Length (cm) 16 cm 07/29/20 1450   Post-Procedure Width (cm) 6.9 cm 07/29/20 1450   Post-Procedure Depth (cm) 0.3 cm 07/22/20 1439   Post-Procedure Surface Area (cm^2) 110.4 cm^2 07/29/20 1450   Post-Procedure Volume (cm^3) 33.15 cm^3 07/22/20 1439   Wound Healing % 81 07/22/20 1439   Wound Bed Granulation (%) 80 % 07/29/20 1450   Wound Bed Epithelium (%) 0 % 07/29/20 1450   Wound Bed Slough (%) 20 % 07/29/20 1450   Wound Bed Eschar (%) 0 % 07/29/20 1450   Wound Bed Granulation (%) - Post-Procedure 100 % 06/12/20 0945   Wound Bed Slough % - (Post-Procedure) 5 % 06/05/20 0930   Tunneling (cm) 0 cm 07/29/20 1450   Undermining (cm) 0 cm 07/29/20 1450   Wound Odor Mild;Foul 07/29/20 1450   Pulses Left;2+;DP 06/05/20 0930   Exposed Structures None 07/29/20 1450       Wound 06/05/20 Full Thickness Wound Ankle LLE distal posteriomedial (Active)   Wound Image    07/29/20 1450   Site Assessment Red;Yellow 07/29/20 1450   Periwound Assessment Denuded;Edema;Blanchable erythema 07/29/20 1450   Margins Attached edges 07/29/20 1450   Closure Secondary intention  07/15/20 1430   Drainage Amount Copious 07/29/20 1450   Drainage Description Serosanguineous 07/29/20 1450   Treatments Cleansed;Topical Lidocaine;Provider debridement 07/29/20 1450   Wound Cleansing Foam Cleanser/Washcloth 07/29/20 1450   Periwound Protectant TAC Ointment;Barrier Paste 07/29/20 1450   Dressing Cleansing/Solutions Not Applicable 07/15/20 1430   Dressing Options Hydrofera Blue Ready;Absorbent Abdominal Pad;Dry Roll Gauze;Unna Boot 07/29/20 1450   Dressing Changed New 07/29/20 1450   Dressing Status Clean;Dry;Intact 07/15/20 1430   Dressing Change/Treatment Frequency Monday, Wednesday, Friday, and As Needed 07/15/20 1430   Non-staged Wound Description Full thickness 07/29/20 1450   Wound Length (cm) 2.8 cm 07/29/20 1450   Wound Width (cm) 6 cm 07/29/20 1450   Wound Depth (cm) 0.3 cm 07/29/20 1450   Wound Surface Area (cm^2) 16.8 cm^2 07/29/20 1450   Wound Volume (cm^3) 5.04 cm^3 07/29/20 1450   Post-Procedure Length (cm) 2.9 cm 07/29/20 1450   Post-Procedure Width (cm) 6 cm 07/29/20 1450   Post-Procedure Depth (cm) 0.4 cm 07/29/20 1450   Post-Procedure Surface Area (cm^2) 17.4 cm^2 07/29/20 1450   Post-Procedure Volume (cm^3) 6.96 cm^3 07/29/20 1450   Wound Healing % 14 07/29/20 1450   Wound Bed Granulation (%) 60 % 07/29/20 1450   Wound Bed Epithelium (%) 0 % 07/29/20 1450   Wound Bed Slough (%) 40 % 07/29/20 1450   Wound Bed Eschar (%) 0 % 07/29/20 1450   Wound Bed Granulation (%) - Post-Procedure 80 % 06/12/20 0945   Wound Bed Slough % - (Post-Procedure) 20 % 06/12/20 0945   Tunneling (cm) 0 cm 07/29/20 1450   Undermining (cm) 0 cm 07/29/20 1450   Wound Odor Mild;Foul 07/29/20 1450   Pulses Left;2+;DP 06/05/20 0930   Exposed Structures None 07/29/20 1450       Wound 06/05/20 Full Thickness Wound Leg Left LLE posteromedial (Active)   Wound Image    07/29/20 1450   Site Assessment Red;Yellow 07/29/20 1450   Periwound Assessment Edema;Denuded;Blanchable erythema 07/29/20 1450   Margins Attached edges  07/29/20 1450   Closure Secondary intention 07/15/20 1430   Drainage Amount Copious 07/29/20 1450   Drainage Description Serosanguineous 07/29/20 1450   Treatments Cleansed;Topical Lidocaine;Provider debridement 07/29/20 1450   Wound Cleansing Foam Cleanser/Washcloth 07/29/20 1450   Periwound Protectant Barrier Paste;TAC Ointment 07/29/20 1450   Dressing Cleansing/Solutions Not Applicable 07/15/20 1430   Dressing Options Hydrofera Blue Ready;Absorbent Abdominal Pad;Dry Roll Gauze;Unna Boot 07/29/20 1450   Dressing Changed New 07/29/20 1450   Dressing Status Clean;Dry;Intact 07/15/20 1430   Dressing Change/Treatment Frequency Monday, Wednesday, Friday, and As Needed 07/15/20 1430   Non-staged Wound Description Full thickness 07/29/20 1450   Wound Length (cm) 9.5 cm 07/29/20 1450   Wound Width (cm) 10.5 cm 07/29/20 1450   Wound Depth (cm) 0.3 cm 07/29/20 1450   Wound Surface Area (cm^2) 99.75 cm^2 07/29/20 1450   Wound Volume (cm^3) 29.92 cm^3 07/29/20 1450   Post-Procedure Length (cm) 9.9 cm 07/29/20 1450   Post-Procedure Width (cm) 10.5 cm 07/29/20 1450   Post-Procedure Depth (cm) 0.4 cm 07/29/20 1450   Post-Procedure Surface Area (cm^2) 103.95 cm^2 07/29/20 1450   Post-Procedure Volume (cm^3) 41.58 cm^3 07/29/20 1450   Wound Healing % -296 07/29/20 1450   Wound Bed Granulation (%) 50 % 07/29/20 1450   Wound Bed Epithelium (%) 0 % 07/29/20 1450   Wound Bed Slough (%) 50 % 07/29/20 1450   Wound Bed Eschar (%) 0 % 07/29/20 1450   Wound Bed Granulation (%) - Post-Procedure 80 % 06/12/20 0945   Wound Bed Slough % - (Post-Procedure) 20 % 06/12/20 0945   Tunneling (cm) 0 cm 07/29/20 1450   Undermining (cm) 0 cm 07/29/20 1450   Wound Odor Mild;Foul 07/29/20 1450   Pulses 2+;Left;DP 06/05/20 0930   Exposed Structures None 07/29/20 1450       Wound 06/05/20 Full Thickness Wound Leg Right R anterolateral LE (Active)   Wound Image    07/29/20 1450   Site Assessment Red 07/29/20 1450   Periwound Assessment Edema 07/29/20 1450    Margins Attached edges 07/29/20 1450   Closure Secondary intention 07/15/20 1430   Drainage Amount Moderate 07/29/20 1450   Drainage Description Serosanguineous 07/29/20 1450   Treatments Cleansed;Topical Lidocaine;Provider debridement 07/29/20 1450   Wound Cleansing Foam Cleanser/Washcloth 07/29/20 1450   Periwound Protectant Barrier Paste;TAC Ointment 07/29/20 1450   Dressing Cleansing/Solutions Not Applicable 07/15/20 1430   Dressing Options Hydrofera Blue Ready;Hydrofiber;Unna Boot 07/29/20 1450   Dressing Changed New 07/29/20 1450   Dressing Status Clean;Dry;Intact 07/15/20 1430   Dressing Change/Treatment Frequency Every 72 hrs, and As Needed 07/15/20 1430   Non-staged Wound Description Full thickness 07/29/20 1450   Wound Length (cm) 3 cm 07/29/20 1450   Wound Width (cm) 2 cm 07/29/20 1450   Wound Depth (cm) 0.2 cm 07/29/20 1450   Wound Surface Area (cm^2) 6 cm^2 07/29/20 1450   Wound Volume (cm^3) 1.2 cm^3 07/29/20 1450   Post-Procedure Length (cm) 3.5 cm 07/29/20 1450   Post-Procedure Width (cm) 2 cm 07/29/20 1450   Post-Procedure Depth (cm) 0.2 cm 07/29/20 1450   Post-Procedure Surface Area (cm^2) 7 cm^2 07/29/20 1450   Post-Procedure Volume (cm^3) 1.4 cm^3 07/29/20 1450   Wound Healing % 76 07/29/20 1450   Wound Bed Granulation (%) 20 % 06/12/20 0945   Wound Bed Slough (%) 80 % 06/12/20 0945   Wound Bed Granulation (%) - Post-Procedure 60 % 06/12/20 0945   Wound Bed Slough % - (Post-Procedure) 40 % 06/12/20 0945   Tunneling (cm) 0 cm 07/29/20 1450   Undermining (cm) 0 cm 07/29/20 1450   Wound Odor Mild;Foul 07/29/20 1450   Pulses 2+;DP;Right 06/05/20 0930   Exposed Structures None 07/29/20 4066              PROCEDURE: Excisional debridement of left lower extremity wounds   -2% viscous lidocaine applied topically to wound beds for approximately 10 minutes prior to debridement  -Curette used to excise thick layer of slough from wound beds.  Excisional debridement was performed to remove devitalized tissue  until healthy, bleeding tissue was visualized. Total area debrided to 238.75 cm².  Tissue debrided into the subcutaneous layer.    -Bleeding controlled with manual pressure.    -Wounds cleansed with normal saline  -Wound culture collected, wound care completed by  RN, refer to flowsheet  -Patient tolerated the procedure well, without c/o pain or discomfort.         Pertinent Labs and Diagnostics:    Labs:     A1c:   Lab Results   Component Value Date/Time    HBA1C 5.7 (H) 01/23/2020 11:22 AM          VASCULAR STUDIES: BHUPINDER 12/27/2019   Right.    Doppler waveform of the common femoral artery is of high amplitude and    triphasic.    Doppler waveforms at the ankle are brisk and triphasic.    Ankle-brachial index is normal.       Left.    Could not perform ankle-brachial index due to large blisters/ulcers.   Normal toe-brachial index: 0.94   Doppler waveform of the common femoral artery is of high amplitude and    triphasic.    Biphasic waveforms seen at the ankle.     WOUND CULTURE:    DATE: 6/19/2020-culture collected in clinic  Culture Result  Abnormal     Beta Hemolytic Streptococcus group A   Heavy growth     Culture Result  Abnormal     Staphylococcus aureus   Moderate growth     Culture Result  Abnormal     Serratia marcescens   Moderate growth     Culture Result  Abnormal     Proteus mirabilis   Moderate growth     Culture Result  Abnormal     Pseudomonas species   Light growth   unable to isolate for sensitivity            ASSESSMENT AND PLAN:     1. Skin ulcer of left lower leg with fat layer exposed (HCC)  Comment: Patient has history of recurring ulcerations to left lower extremity.  Has undergone ablation procedures by Dr. White.  Discharged from [facility] in March 2020 due to healing, returned approximately 3 weeks later much worse.  Hospitalized from 4/16 until 5/21/2020 underwent multiple surgical debridements.    7/29/2020: Comments: Wound VAC held in clinic today due to the patient's increased maceration  to left lower extremity as well as erythema.  Patient to bring in wound VAC next clinic visit may discontinue wound VAC completely.  -Excisional debridement of wound in clinic today, medically necessary to promote wound healing.  -Patient to be seen in clinic 1 time a week for assessment and debridement and wound VAC dressing change.  Patient to be seen by home health twice a week for wound VAC change and assessment.        Wound care: Wound VAC on hold. Hydrofera Blue ready, absorbent abdominal pad, dry roll gauze, Unna boot     2. Skin ulcer of multiple sites of right lower extremity with fat layer exposed (HCC)  Comments: First observed in clinic on 6/5/2020, on initial evaluation. Patient is not certain when or how they occurred.  There is no documentation of wounds to this leg during recent hospitalization, so presumably they occurred after he went home.    7/22/2020    wound is improving with beefy red granulation tissue  -Excisional debridement of wound in clinic today, medically necessary to promote wound healing.        3. Chronic venous stasis  Comments: Hemosiderin staining, brawny edema, scarring- findings consistent with CVI.  Pt established at [facility], underwent ablation procedures in 2019.     -Consider referral back to vascular if wounds failed to progress or deteriorate.    4. Acquired deformity of left ankle and foot  Comments: Charcot deformity of foot and ankle.  Very little ROM of ankle.  Patient was referred to orthopedics previously, however did not make appointment.    -Patient will need to follow-up with orthopedics once wounds are healed.    5.  Wound infection     7/22/2020: Wound culture collected in clinic on 7/29/2020  -Patient with periwound erythema as well as foul-smelling odor.          PATIENT EDUCATION  - Pt advised to go to ER for any increased redness, swelling, drainage or odor, or if he develops fever, chills, nausea or vomiting.        Please note that this note may have  been created using voice recognition software. I have worked with technical experts from [facility] to optimize the interface.  I have made every reasonable attempt to correct obvious errors, but there may be errors of grammar and possibly content that I did not discover before finalizing the note.    N

## 2020-07-29 NOTE — LETTER
July 29, 2020    To: [facility]    Re: Goran Chavez (Date of birth 1955)    Wound Care Order:    · Remove old dressings.  · Cleanse right and left leg wounds with normal saline and gauze. Pat dry.  · Apply zinc barrier cream to sandrine-wounds.  · Cut slits into Hydrofera Blue Ready (to allow drainage to get through plastic backing), then apply to wounds.  · Cover Hydrofera Blue with ABD pads.  · Secure ABD pads with roll gauze.  · Apply Unna Boots to left and right lower legs. Ensure that top of Unna Boots are no more than two inches below the bend of the knee.    · Home Health to see patient twice per week for dressing changes.            ______________________________________  Sanju ANGULO

## 2020-07-30 LAB
GRAM STN SPEC: NORMAL
SIGNIFICANT IND 70042: NORMAL
SITE SITE: NORMAL
SOURCE SOURCE: NORMAL

## 2020-08-04 DIAGNOSIS — L08.9 WOUND INFECTION: ICD-10-CM

## 2020-08-04 DIAGNOSIS — T14.8XXA WOUND INFECTION: ICD-10-CM

## 2020-08-04 RX ORDER — CIPROFLOXACIN 500 MG/1
500 TABLET, FILM COATED ORAL 2 TIMES DAILY
Qty: 20 TAB | Refills: 0 | Status: SHIPPED | OUTPATIENT
Start: 2020-08-04 | End: 2020-08-14

## 2020-08-04 RX ORDER — AMOXICILLIN AND CLAVULANATE POTASSIUM 875; 125 MG/1; MG/1
1 TABLET, FILM COATED ORAL 2 TIMES DAILY
Qty: 20 TAB | Refills: 0 | Status: SHIPPED | OUTPATIENT
Start: 2020-08-04 | End: 2020-08-14

## 2020-08-05 ENCOUNTER — OFFICE VISIT (OUTPATIENT)
Dept: WOUND CARE | Facility: MEDICAL CENTER | Age: 65
End: 2020-08-05
Attending: NURSE PRACTITIONER
Payer: MEDICARE

## 2020-08-05 VITALS
RESPIRATION RATE: 16 BRPM | SYSTOLIC BLOOD PRESSURE: 127 MMHG | HEART RATE: 75 BPM | TEMPERATURE: 98.4 F | OXYGEN SATURATION: 97 % | DIASTOLIC BLOOD PRESSURE: 80 MMHG

## 2020-08-05 DIAGNOSIS — L97.922 SKIN ULCER OF LEFT LOWER LEG WITH FAT LAYER EXPOSED (HCC): Primary | ICD-10-CM

## 2020-08-05 DIAGNOSIS — I87.8 CHRONIC VENOUS STASIS: ICD-10-CM

## 2020-08-05 DIAGNOSIS — L08.9 WOUND INFECTION: ICD-10-CM

## 2020-08-05 DIAGNOSIS — T14.8XXA WOUND INFECTION: ICD-10-CM

## 2020-08-05 DIAGNOSIS — M21.962 ACQUIRED DEFORMITY OF LEFT ANKLE AND FOOT: ICD-10-CM

## 2020-08-05 DIAGNOSIS — L97.912 SKIN ULCER OF MULTIPLE SITES OF RIGHT LOWER EXTREMITY WITH FAT LAYER EXPOSED (HCC): ICD-10-CM

## 2020-08-05 PROCEDURE — 11045 DBRDMT SUBQ TISS EACH ADDL: CPT

## 2020-08-05 PROCEDURE — 11045 DBRDMT SUBQ TISS EACH ADDL: CPT | Performed by: NURSE PRACTITIONER

## 2020-08-05 PROCEDURE — 11042 DBRDMT SUBQ TIS 1ST 20SQCM/<: CPT

## 2020-08-05 PROCEDURE — 11042 DBRDMT SUBQ TIS 1ST 20SQCM/<: CPT | Performed by: NURSE PRACTITIONER

## 2020-08-05 ASSESSMENT — ENCOUNTER SYMPTOMS
DEPRESSION: 1
FEVER: 0
CONSTIPATION: 0
MYALGIAS: 0
NERVOUS/ANXIOUS: 0
VOMITING: 0
NAUSEA: 0
DIARRHEA: 0
COUGH: 0
SHORTNESS OF BREATH: 0
CHILLS: 0
CLAUDICATION: 0

## 2020-08-05 NOTE — PROGRESS NOTES
Provider Encounter- Full Thickness wound    HISTORY OF PRESENT ILLNESS  Wound History:    START OF CARE IN CLINIC: 5/20/2020               REFERRING PROVIDER: KEV Keith                 WOUND ETIOLOGY: venous,  likely mixed etiology              LOCATION: Left lower leg, multiple wounds                                   Right lower leg, multiple wounds-first observed in clinic on initial evaluation, 6/5/2020                HISTORY: Patient is very well-known to this clinic from previous treatment of wounds to his left lower extremity.  He was discharged from the clinic in March 2020 due to full resolution of his wound.  He returned to the clinic approximately 3 weeks later, on 4/16, with cellulitis, edema, and large open wounds to his left lower leg, and was sent directly to [facility] emergency room.  He was admitted for IV antibiotics and surgical intervention.  On 4/22/2020, he underwent an I&D of his left lower extremity, fasciotomy, and VAC placement.  In the following weeks he underwent surgery 3 more times for irrigation and debridement.  He was discharged home on 5/21, on the VAC, with home health and a referral to [facility].       Pertinent Medical History:  Anxiety, Charcot's joint of left foot, non-diabetic (3/21/2016), Chronic congestive heart failure (HCC) (11/16/2017), Hepatitis C, chronic (HCC), Hypertension, Migraine, Polysubstance abuse (HCC) (3/8/2018), Tobacco use (4/18/2016), Ulcer of left lower extremity with necrosis of muscle (HCC) (3/21/2016), and Venous stasis ulcer ([facility]) (2017).                ETIOLOGY HISTORY:  Vascular Surgeon: Dr. White. Compression Circ-aid. Varicose Veins none visible     TOBACCO USE: Patient denies; patient also denies alcohol and recreational drug use    Patient's problem list, allergies, and current medications reviewed and updated in Epic    Interval History:  6/5/2020: Initial clinic visit with KEV Brantley.  Patient returns to clinic after a  "long hospitalization.  The VAC was held by home health due to severe maceration and deterioration of wound beds.  He also presents today with new wounds to his right lower extremity.  He states he does not know how or when these occurred, states, \"they just popped up\".  He is uncertain whether or not he has follow-up appointments with his surgeon.  He does not know what medications he is on.      6/12/2020 : Clinic visit with KEV Brantley.  Goran states he is feeling well today,denies fevers, chills, nausea, vomiting, cough or shortness of breath.  Condition of his periwound skin is significantly improved.  I would like to restart VAC, however he did not bring the VAC with him, and that has already been on for 2-1/2 weeks.  Will likely need to be reordered.  [facility] continues to see him several times per week for dressing changes.  He is tolerating compression without any difficulty.    6/19/2020 : Clinic visit with KEV Brantley.   Goran states he is feeling well today,denies fevers, chills, nausea, vomiting, cough or shortness of breath.  He brought his VAC with him today.  However,  too much slough on wound beds to consider re-applying.  [facility] continues to see him 2 times per week.  I have asked him to bring the VAC with him again next week, and will hopefully place it at that time.  Per patient, dressings are saturated between dressing changes.    6/26/2020 : Clinic visit with KEV Brantley.   Goran states he is feeling well today,denies fevers, chills, nausea, vomiting, cough or shortness of breath.  He brought his VAC with him again today, however we are not able to put it on as he is still having twice drainage.  Sensitivities from culture still pending.  However I did start him on Augmentin today which we are able to obtain from [facility] prior to his leaving the clinic today and had him take one before he left.  [facility] is been seen 2 times per week in between clinic visits.  " We will try and get them to see him over the weekend to do an additional dressing change.  Will adjust antibiotics once sensitivities are known.  Hopefully start VAC next clinic visit.    7/2/2020 : Clinic visit with KEV Brantley.    Goran states he is feeling well today,denies fevers, chills, nausea, vomiting, cough or shortness of breath.  His VAC is here in the clinic, however he does not have another home health appointment until Monday, 4 days from now.  He has been taking Augmentin as prescribed last week.  I informed him that I added Bactrim DS after I got sensitivity results, and that it should be waiting for him at his pharmacy.    7/9/2020: Clinic visit with KEV Luna. Patient states that they are feeling well today.  Patient denies fever, chills, nausea, vomiting, lightheadedness, dizziness, shortness of breath and chest pain.  All wound beds debrided in clinic today patient's wounds continue to progress beefy red granulation tissue to left lower extremity lateral wound tendon is being covered with granulation tissue continue with wound VAC and current plan of care.    7/15/2020 : Clinic visit with KEV Brantley.  Goran states he is feeling well today,denies fevers, chills, nausea, vomiting, cough or shortness of breath.  Home health continues to see him in between clinic visits.  He states he has not been smoking, drinking or using any street drugs.    7/22/2020 : Clinic visit with KEV Brantley.   Goran states he is feeling well today,denies fevers, chills, nausea, vomiting, cough or shortness of breath.  Home health continues to see him in between clinic visits.    7/29/2020: Clinic visit with KEV Luna. Patient states that they are feeling well today.  Patient denies fever, chills, nausea, vomiting, lightheadedness, dizziness, shortness of breath and chest pain.    8/5/2020: Clinic visit with KEV Luna. Patient states that they are feeling  well today.  Patient denies fever, chills, nausea, vomiting, lightheadedness, dizziness, shortness of breath and chest pain.  Patient's wounds are foul-smelling today.  Patient had positive culture we have already ordered the patient Cipro and Augmentin patient reports that he got my message from last night.  Patient reports that he is can  antibiotics tonight in the ED initiate them.  Patient has been referred to infectious disease due to multiple bacterial growth and for their expert opinion.      REVIEW OF SYSTEMS:   Review of Systems   Constitutional: Negative for chills and fever.        States he is eating well, appetite normal   Respiratory: Negative for cough and shortness of breath.    Cardiovascular: Positive for leg swelling. Negative for chest pain and claudication.        Chronic swelling in legs, for several years   Gastrointestinal: Negative for constipation, diarrhea, nausea and vomiting.   Genitourinary: Negative for dysuria.   Musculoskeletal: Positive for joint pain. Negative for myalgias.        Chronic   Psychiatric/Behavioral: Positive for depression. The patient is not nervous/anxious.        PHYSICAL EXAMINATION:   /80   Pulse 75   Temp 36.9 °C (98.4 °F)   Resp 16   SpO2 97%     Physical Exam   Constitutional: He is oriented to person, place, and time and well-developed, well-nourished, and in no distress.   Disheveled unkept   HENT:   Head: Normocephalic and atraumatic.   Eyes: Pupils are equal, round, and reactive to light. Conjunctivae and EOM are normal.   Neck: Neck supple.   Cardiovascular: Intact distal pulses.   Pulmonary/Chest: Effort normal.   Musculoskeletal: Normal range of motion.         General: Deformity and edema present.      Comments: Left charcot foot and ankle  1+pitting edema to bilateral LE   Neurological: He is alert and oriented to person, place, and time.   Skin: Skin is warm. There is erythema.   Large full-thickness wounds to bilateral lower  extremities  Further wound flowsheet and photos   Psychiatric: Affect normal.   Patient lacks insight into his own healthcare issues, defers to his daughter to keep track of his medications and appointments       WOUND ASSESSMENT           Wound 06/05/20 Full Thickness Wound Leg Left LLE anterolateral (Active)   Wound Image    08/05/20 1415   Site Assessment Pink;Red;Yellow 08/05/20 1415   Periwound Assessment Denuded;Edema;Blanchable erythema 08/05/20 1415   Margins Attached edges 08/05/20 1415   Closure Secondary intention 07/15/20 1430   Drainage Amount Copious 08/05/20 1415   Drainage Description Serosanguineous 08/05/20 1415   Treatments Cleansed;Topical Lidocaine;Provider debridement 08/05/20 1415   Wound Cleansing Foam Cleanser/Washcloth 08/05/20 1415   Periwound Protectant Skin Moisturizer;TAC Ointment;Stoma Powder;Barrier Paste 08/05/20 1415   Dressing Cleansing/Solutions Not Applicable 08/05/20 1415   Dressing Options Hydrofera Blue Ready;Absorbent Abdominal Pad;Unna Boot 08/05/20 1415   Dressing Changed Changed 08/05/20 1415   Dressing Status Clean;Dry;Intact 08/05/20 1415   Dressing Change/Treatment Frequency Monday, Wednesday, Friday, and As Needed 07/15/20 1430   Non-staged Wound Description Full thickness 08/05/20 1415   Wound Length (cm) 17.7 cm 08/05/20 1415   Wound Width (cm) 6.2 cm 08/05/20 1415   Wound Depth (cm) 0.1 cm 07/22/20 1439   Wound Surface Area (cm^2) 109.74 cm^2 08/05/20 1415   Wound Volume (cm^3) 10.56 cm^3 07/22/20 1439   Post-Procedure Length (cm) 17.8 cm 08/05/20 1415   Post-Procedure Width (cm) 6.3 cm 08/05/20 1415   Post-Procedure Depth (cm) 0.3 cm 07/22/20 1439   Post-Procedure Surface Area (cm^2) 112.14 cm^2 08/05/20 1415   Post-Procedure Volume (cm^3) 33.15 cm^3 07/22/20 1439   Wound Healing % 81 07/22/20 1439   Wound Bed Granulation (%) 70 % 08/05/20 1415   Wound Bed Epithelium (%) 0 % 07/29/20 1450   Wound Bed Slough (%) 30 % 08/05/20 1415   Wound Bed Eschar (%) 0 % 07/29/20  1450   Wound Bed Granulation (%) - Post-Procedure 100 % 06/12/20 0945   Wound Bed Slough % - (Post-Procedure) 5 % 06/05/20 0930   Tunneling (cm) 0 cm 08/05/20 1415   Undermining (cm) 0 cm 08/05/20 1415   Wound Odor Strong 08/05/20 1415   Pulses Left;2+;DP 06/05/20 0930   Exposed Structures None 08/05/20 1415       Wound 06/05/20 Full Thickness Wound Ankle LLE distal posteriomedial (Active)   Wound Image    08/05/20 1415   Site Assessment Red;Yellow 08/05/20 1415   Periwound Assessment Denuded;Edema;Blanchable erythema 08/05/20 1415   Margins Attached edges 08/05/20 1415   Closure Secondary intention 07/15/20 1430   Drainage Amount Copious 08/05/20 1415   Drainage Description Serosanguineous 08/05/20 1415   Treatments Cleansed;Topical Lidocaine;Provider debridement 08/05/20 1415   Wound Cleansing Foam Cleanser/Washcloth 08/05/20 1415   Periwound Protectant Skin Moisturizer;TAC Ointment;Barrier Paste;Stoma Powder 08/05/20 1415   Dressing Cleansing/Solutions Not Applicable 08/05/20 1415   Dressing Options Hydrofera Blue Ready;Absorbent Abdominal Pad;Unna Boot 08/05/20 1415   Dressing Changed Changed 08/05/20 1415   Dressing Status Clean;Dry;Intact 08/05/20 1415   Dressing Change/Treatment Frequency Monday, Wednesday, Friday, and As Needed 07/15/20 1430   Non-staged Wound Description Full thickness 08/05/20 1415   Wound Length (cm) 2.4 cm 08/05/20 1415   Wound Width (cm) 5.8 cm 08/05/20 1415   Wound Depth (cm) 0.2 cm 08/05/20 1415   Wound Surface Area (cm^2) 13.92 cm^2 08/05/20 1415   Wound Volume (cm^3) 2.78 cm^3 08/05/20 1415   Post-Procedure Length (cm) 2.5 cm 08/05/20 1415   Post-Procedure Width (cm) 5.8 cm 08/05/20 1415   Post-Procedure Depth (cm) 0.2 cm 08/05/20 1415   Post-Procedure Surface Area (cm^2) 14.5 cm^2 08/05/20 1415   Post-Procedure Volume (cm^3) 2.9 cm^3 08/05/20 1415   Wound Healing % 52 08/05/20 1415   Wound Bed Granulation (%) 60 % 07/29/20 1450   Wound Bed Epithelium (%) 0 % 07/29/20 1450   Wound  Bed Slough (%) 40 % 07/29/20 1450   Wound Bed Eschar (%) 0 % 07/29/20 1450   Wound Bed Granulation (%) - Post-Procedure 80 % 06/12/20 0945   Wound Bed Slough % - (Post-Procedure) 20 % 06/12/20 0945   Tunneling (cm) 0 cm 08/05/20 1415   Undermining (cm) 0 cm 08/05/20 1415   Wound Odor Strong 08/05/20 1415   Pulses Left;2+;DP 06/05/20 0930   Exposed Structures None 08/05/20 1415       Wound 06/05/20 Full Thickness Wound Leg Left LLE posteromedial (Active)   Wound Image    08/05/20 1415   Site Assessment Red;Yellow 08/05/20 1415   Periwound Assessment Edema;Denuded;Blanchable erythema 08/05/20 1415   Margins Attached edges 08/05/20 1415   Closure Secondary intention 07/15/20 1430   Drainage Amount Copious 08/05/20 1415   Drainage Description Serosanguineous 08/05/20 1415   Treatments Cleansed;Topical Lidocaine;Provider debridement 08/05/20 1415   Wound Cleansing Foam Cleanser/Washcloth 08/05/20 1415   Periwound Protectant Skin Moisturizer;TAC Ointment;Barrier Paste;Stoma Powder 08/05/20 1415   Dressing Cleansing/Solutions Not Applicable 08/05/20 1415   Dressing Options Hydrofera Blue Ready;Absorbent Abdominal Pad;Unna Boot 08/05/20 1415   Dressing Changed Changed 08/05/20 1415   Dressing Status Clean;Dry;Intact 08/05/20 1415   Dressing Change/Treatment Frequency Monday, Wednesday, Friday, and As Needed 08/05/20 1415   Non-staged Wound Description Full thickness 08/05/20 1415   Wound Length (cm) 9 cm 08/05/20 1415   Wound Width (cm) 9.5 cm 08/05/20 1415   Wound Depth (cm) 0.2 cm 08/05/20 1415   Wound Surface Area (cm^2) 85.5 cm^2 08/05/20 1415   Wound Volume (cm^3) 17.1 cm^3 08/05/20 1415   Post-Procedure Length (cm) 9.1 cm 08/05/20 1415   Post-Procedure Width (cm) 9.6 cm 08/05/20 1415   Post-Procedure Depth (cm) 0.2 cm 08/05/20 1415   Post-Procedure Surface Area (cm^2) 87.36 cm^2 08/05/20 1415   Post-Procedure Volume (cm^3) 17.47 cm^3 08/05/20 1415   Wound Healing % -126 08/05/20 1415   Wound Bed Granulation (%) 50 %  07/29/20 1450   Wound Bed Epithelium (%) 0 % 07/29/20 1450   Wound Bed Slough (%) 50 % 07/29/20 1450   Wound Bed Eschar (%) 0 % 07/29/20 1450   Wound Bed Granulation (%) - Post-Procedure 80 % 06/12/20 0945   Wound Bed Slough % - (Post-Procedure) 20 % 06/12/20 0945   Tunneling (cm) 0 cm 08/05/20 1415   Undermining (cm) 0 cm 08/05/20 1415   Wound Odor Strong 08/05/20 1415   Pulses 2+;Left;DP 06/05/20 0930   Exposed Structures None 08/05/20 1415       Wound 06/05/20 Full Thickness Wound Leg Right R anterolateral LE (Active)   Wound Image    08/05/20 1415   Site Assessment Pink;Red 08/05/20 1415   Periwound Assessment Edema;Fragile 08/05/20 1415   Margins Attached edges 08/05/20 1415   Closure Secondary intention 07/15/20 1430   Drainage Amount Moderate 08/05/20 1415   Drainage Description Serosanguineous 08/05/20 1415   Treatments Cleansed;Topical Lidocaine;Provider debridement 08/05/20 1415   Wound Cleansing Foam Cleanser/Washcloth 08/05/20 1415   Periwound Protectant Skin Moisturizer;TAC Ointment;Stoma Powder;Barrier Paste 08/05/20 1415   Dressing Cleansing/Solutions Not Applicable 08/05/20 1415   Dressing Options Hydrofera Blue Ready;Hydrofiber;Unna Boot 08/05/20 1415   Dressing Changed Changed 08/05/20 1415   Dressing Status Clean;Dry;Intact 08/05/20 1415   Dressing Change/Treatment Frequency Every 72 hrs, and As Needed 07/15/20 1430   Non-staged Wound Description Full thickness 08/05/20 1415   Wound Length (cm) 3 cm 08/05/20 1415   Wound Width (cm) 1.6 cm 08/05/20 1415   Wound Depth (cm) 0.1 cm 08/05/20 1415   Wound Surface Area (cm^2) 4.8 cm^2 08/05/20 1415   Wound Volume (cm^3) 0.48 cm^3 08/05/20 1415   Post-Procedure Length (cm) 3.1 cm 08/05/20 1415   Post-Procedure Width (cm) 1.6 cm 08/05/20 1415   Post-Procedure Depth (cm) 0.1 cm 08/05/20 1415   Post-Procedure Surface Area (cm^2) 4.96 cm^2 08/05/20 1415   Post-Procedure Volume (cm^3) 0.5 cm^3 08/05/20 1415   Wound Healing % 90 08/05/20 1415   Wound Bed  Granulation (%) 20 % 06/12/20 0945   Wound Bed Slough (%) 80 % 06/12/20 0945   Wound Bed Granulation (%) - Post-Procedure 60 % 06/12/20 0945   Wound Bed Slough % - (Post-Procedure) 40 % 06/12/20 0945   Tunneling (cm) 0 cm 08/05/20 1415   Undermining (cm) 0 cm 08/05/20 1415   Wound Odor Strong 08/05/20 1415   Pulses 2+;DP;Right 06/05/20 0930   Exposed Structures None 08/05/20 1415            PROCEDURE: Excisional debridement of left lower extremity wounds   -2% viscous lidocaine applied topically to wound beds for approximately 10 minutes prior to debridement  -Curette used to excise thick layer of slough from wound beds.  Excisional debridement was performed to remove devitalized tissue until healthy, bleeding tissue was visualized. Total area debrided to 220 cm².  Tissue debrided into the subcutaneous layer.    -Bleeding controlled with manual pressure.    -Wounds cleansed with normal saline  -Wound culture collected, wound care completed by  RN, refer to flowsheet  -Patient tolerated the procedure well, without c/o pain or discomfort.         Pertinent Labs and Diagnostics:    Labs:     A1c:   Lab Results   Component Value Date/Time    HBA1C 5.7 (H) 01/23/2020 11:22 AM          VASCULAR STUDIES: BHUPINDER 12/27/2019   Right.    Doppler waveform of the common femoral artery is of high amplitude and    triphasic.    Doppler waveforms at the ankle are brisk and triphasic.    Ankle-brachial index is normal.       Left.    Could not perform ankle-brachial index due to large blisters/ulcers.   Normal toe-brachial index: 0.94   Doppler waveform of the common femoral artery is of high amplitude and    triphasic.    Biphasic waveforms seen at the ankle.     WOUND CULTURE:    DATE: 6/19/2020-culture collected in clinic  Culture Result  Abnormal     Beta Hemolytic Streptococcus group A   Heavy growth     Culture Result  Abnormal     Staphylococcus aureus   Moderate growth     Culture Result  Abnormal     Serratia marcescens    Moderate growth     Culture Result  Abnormal     Proteus mirabilis   Moderate growth     Culture Result  Abnormal     Pseudomonas species   Light growth   unable to isolate for sensitivity            ASSESSMENT AND PLAN:     1. Skin ulcer of left lower leg with fat layer exposed (HCC)  Comment: Patient has history of recurring ulcerations to left lower extremity.  Has undergone ablation procedures by Dr. White.  Discharged from [facility] in March 2020 due to healing, returned approximately 3 weeks later much worse.  Hospitalized from 4/16 until 5/21/2020 underwent multiple surgical debridements.    08/05/20: Wound VAC has been held again today due to periwound irritation and redness to viable tissue.  -Excisional debridement of wound in clinic today, medically necessary to promote wound healing.  -Patient to be seen in clinic 1 time a week for assessment and debridement     Patient to be seen by home health twice a week for wound dressing change    Wound care: Hydrofera Blue ready, absorbent abdominal pad, Unna boot     2. Skin ulcer of multiple sites of right lower extremity with fat layer exposed (HCC)  Comments: First observed in clinic on 6/5/2020, on initial evaluation. Patient is not certain when or how they occurred.  There is no documentation of wounds to this leg during recent hospitalization, so presumably they occurred after he went home.    08/05/20:   Wound is improving with beefy red granulation tissue  -Excisional debridement of wound in clinic today, medically necessary to promote wound healing.  -Patient to return to clinic weekly for assessment and debridement  -Patient to change dressing 1-2 times per week in between clinic visits     Wound care: Hydrofera Blue ready, Hydrofiber x2, Unna boot    3. Chronic venous stasis  Comments: Hemosiderin staining, brawny edema, scarring- findings consistent with CVI.  Pt established at [facility], underwent ablation procedures in 2019.     -Consider referral  back to vascular if wounds failed to progress or deteriorate.    4. Acquired deformity of left ankle and foot  Comments: Charcot deformity of foot and ankle.  Very little ROM of ankle.  Patient was referred to orthopedics previously, however did not make appointment.    -Patient will need to follow-up with orthopedics once wounds are healed.    5.  Wound infection     08/05/20: Wound culture collected in clinic on 7/29/2020  -Patient with periwound erythema as well as foul-smelling odor.  -Culture positive see culture results.  Patient placed on Cipro and Augmentin  -Referral placed to infectious disease for their expert consultation regarding antibiotic therapy for multi-organism culture.        PATIENT EDUCATION  - Pt advised to go to ER for any increased redness, swelling, drainage or odor, or if he develops fever, chills, nausea or vomiting.        Please note that this note may have been created using voice recognition software. I have worked with technical experts from [facility] to optimize the interface.  I have made every reasonable attempt to correct obvious errors, but there may be errors of grammar and possibly content that I did not discover before finalizing the note.    N

## 2020-08-05 NOTE — PATIENT INSTRUCTIONS
-Keep your wound dressing clean, dry, and intact.    -Remove your compression wrap if you have severe pain, severe swelling, numbness, color change, or temperature change in your toes. If you need to remove your compression wrap, do so by unrolling it. Do not cut the compression wrap off to prevent cutting yourself on accident.    -Should you experience any significant changes in your wound(s), such as infection (redness, swelling, localized heat, increased pain, fever > 101 F, chills) or have any questions regarding your home care instructions, please contact the wound center at [phone]. If after hours, contact your primary care physician or go to the hospital emergency room.

## 2020-08-06 NOTE — PROGRESS NOTES
Pt's wounds discussed in rounds with Dr. Gibson and clinic team. Pt with multiple wounds to BLE. Healing has been slow.   Recent wound culture was positive for 5 different bacteria. Pt has been on PO ABX, d/t the recent wound culture, pt will be referred to ID. Pt will contie taking the PO ABX. Vac continues to be held d/t periwound irritation and redness.   Pt to continue weekly clinic appts as well as 2x week  appts

## 2020-08-12 ENCOUNTER — APPOINTMENT (OUTPATIENT)
Dept: WOUND CARE | Facility: MEDICAL CENTER | Age: 65
End: 2020-08-12
Attending: NURSE PRACTITIONER
Payer: MEDICARE

## 2020-08-20 ENCOUNTER — OFFICE VISIT (OUTPATIENT)
Dept: WOUND CARE | Facility: MEDICAL CENTER | Age: 65
End: 2020-08-20
Attending: NURSE PRACTITIONER
Payer: MEDICARE

## 2020-08-20 VITALS
SYSTOLIC BLOOD PRESSURE: 129 MMHG | RESPIRATION RATE: 20 BRPM | HEART RATE: 66 BPM | TEMPERATURE: 98.1 F | DIASTOLIC BLOOD PRESSURE: 81 MMHG | OXYGEN SATURATION: 97 %

## 2020-08-20 DIAGNOSIS — L97.912 SKIN ULCER OF MULTIPLE SITES OF RIGHT LOWER EXTREMITY WITH FAT LAYER EXPOSED (HCC): ICD-10-CM

## 2020-08-20 DIAGNOSIS — I87.8 CHRONIC VENOUS STASIS: ICD-10-CM

## 2020-08-20 DIAGNOSIS — T14.8XXA WOUND INFECTION: ICD-10-CM

## 2020-08-20 DIAGNOSIS — L08.9 WOUND INFECTION: ICD-10-CM

## 2020-08-20 DIAGNOSIS — M21.962 ACQUIRED DEFORMITY OF LEFT ANKLE AND FOOT: ICD-10-CM

## 2020-08-20 DIAGNOSIS — L97.922 SKIN ULCER OF LEFT LOWER LEG WITH FAT LAYER EXPOSED (HCC): Primary | ICD-10-CM

## 2020-08-20 PROCEDURE — 11045 DBRDMT SUBQ TISS EACH ADDL: CPT | Performed by: NURSE PRACTITIONER

## 2020-08-20 PROCEDURE — 11045 DBRDMT SUBQ TISS EACH ADDL: CPT

## 2020-08-20 PROCEDURE — 11042 DBRDMT SUBQ TIS 1ST 20SQCM/<: CPT | Performed by: NURSE PRACTITIONER

## 2020-08-20 PROCEDURE — 11042 DBRDMT SUBQ TIS 1ST 20SQCM/<: CPT

## 2020-08-20 ASSESSMENT — ENCOUNTER SYMPTOMS
MYALGIAS: 0
COUGH: 0
VOMITING: 0
SHORTNESS OF BREATH: 0
DIARRHEA: 0
DEPRESSION: 1
CLAUDICATION: 0
CHILLS: 0
NERVOUS/ANXIOUS: 0
NAUSEA: 0
FEVER: 0
CONSTIPATION: 0

## 2020-08-20 NOTE — PROGRESS NOTES
Provider Encounter- Full Thickness wound    HISTORY OF PRESENT ILLNESS  Wound History:    START OF CARE IN CLINIC: 5/20/2020               REFERRING PROVIDER: KEV Keith                 WOUND ETIOLOGY: venous,  likely mixed etiology              LOCATION: Left lower leg, multiple wounds                                   Right lower leg, multiple wounds-first observed in clinic on initial evaluation, 6/5/2020                HISTORY: Patient is very well-known to this clinic from previous treatment of wounds to his left lower extremity.  He was discharged from the clinic in March 2020 due to full resolution of his wound.  He returned to the clinic approximately 3 weeks later, on 4/16, with cellulitis, edema, and large open wounds to his left lower leg, and was sent directly to [facility] emergency room.  He was admitted for IV antibiotics and surgical intervention.  On 4/22/2020, he underwent an I&D of his left lower extremity, fasciotomy, and VAC placement.  In the following weeks he underwent surgery 3 more times for irrigation and debridement.  He was discharged home on 5/21, on the VAC, with home health and a referral to [facility].       Pertinent Medical History:  Anxiety, Charcot's joint of left foot, non-diabetic (3/21/2016), Chronic congestive heart failure (HCC) (11/16/2017), Hepatitis C, chronic (HCC), Hypertension, Migraine, Polysubstance abuse (HCC) (3/8/2018), Tobacco use (4/18/2016), Ulcer of left lower extremity with necrosis of muscle (HCC) (3/21/2016), and Venous stasis ulcer ([facility]) (2017).                ETIOLOGY HISTORY:  Vascular Surgeon: Dr. White. Compression Circ-aid. Varicose Veins none visible     TOBACCO USE: Patient denies; patient also denies alcohol and recreational drug use    Patient's problem list, allergies, and current medications reviewed and updated in Epic    Interval History:  6/5/2020: Initial clinic visit with KEV Brantley.  Patient returns to clinic after a  "long hospitalization.  The VAC was held by home health due to severe maceration and deterioration of wound beds.  He also presents today with new wounds to his right lower extremity.  He states he does not know how or when these occurred, states, \"they just popped up\".  He is uncertain whether or not he has follow-up appointments with his surgeon.  He does not know what medications he is on.      6/12/2020 : Clinic visit with KEV Brantley.  Goran states he is feeling well today,denies fevers, chills, nausea, vomiting, cough or shortness of breath.  Condition of his periwound skin is significantly improved.  I would like to restart VAC, however he did not bring the VAC with him, and that has already been on for 2-1/2 weeks.  Will likely need to be reordered.  [facility] continues to see him several times per week for dressing changes.  He is tolerating compression without any difficulty.    6/19/2020 : Clinic visit with KEV Brantley.   Goran states he is feeling well today,denies fevers, chills, nausea, vomiting, cough or shortness of breath.  He brought his VAC with him today.  However,  too much slough on wound beds to consider re-applying.  [facility] continues to see him 2 times per week.  I have asked him to bring the VAC with him again next week, and will hopefully place it at that time.  Per patient, dressings are saturated between dressing changes.    6/26/2020 : Clinic visit with KEV Brantley.   Goran states he is feeling well today,denies fevers, chills, nausea, vomiting, cough or shortness of breath.  He brought his VAC with him again today, however we are not able to put it on as he is still having twice drainage.  Sensitivities from culture still pending.  However I did start him on Augmentin today which we are able to obtain from [facility] prior to his leaving the clinic today and had him take one before he left.  Counts include 234 beds at the [facility] is been seen 2 times per week in between clinic visits.  " We will try and get them to see him over the weekend to do an additional dressing change.  Will adjust antibiotics once sensitivities are known.  Hopefully start VAC next clinic visit.    7/2/2020 : Clinic visit with KEV Brantley.    Goran states he is feeling well today,denies fevers, chills, nausea, vomiting, cough or shortness of breath.  His VAC is here in the clinic, however he does not have another home health appointment until Monday, 4 days from now.  He has been taking Augmentin as prescribed last week.  I informed him that I added Bactrim DS after I got sensitivity results, and that it should be waiting for him at his pharmacy.    7/9/2020: Clinic visit with KEV Luna. Patient states that they are feeling well today.  Patient denies fever, chills, nausea, vomiting, lightheadedness, dizziness, shortness of breath and chest pain.  All wound beds debrided in clinic today patient's wounds continue to progress beefy red granulation tissue to left lower extremity lateral wound tendon is being covered with granulation tissue continue with wound VAC and current plan of care.    7/15/2020 : Clinic visit with KEV Brantley.  Goran states he is feeling well today,denies fevers, chills, nausea, vomiting, cough or shortness of breath.  Home health continues to see him in between clinic visits.  He states he has not been smoking, drinking or using any street drugs.    7/22/2020 : Clinic visit with KEV Brantley.   Goran states he is feeling well today,denies fevers, chills, nausea, vomiting, cough or shortness of breath.  Home health continues to see him in between clinic visits.    7/29/2020: Clinic visit with KEV Luna. Patient states that they are feeling well today.  Patient denies fever, chills, nausea, vomiting, lightheadedness, dizziness, shortness of breath and chest pain.    8/5/2020: Clinic visit with KEV Luna. Patient states that they are feeling  well today.  Patient denies fever, chills, nausea, vomiting, lightheadedness, dizziness, shortness of breath and chest pain.  Patient's wounds are foul-smelling today.  Patient had positive culture we have already ordered the patient Cipro and Augmentin patient reports that he got my message from last night.  Patient reports that he is can  antibiotics tonight in the ED initiate them.  Patient has been referred to infectious disease due to multiple bacterial growth and for their expert opinion.    8/20/2020 : Clinic visit with KEV Brantley.  Goran states he is feeling well, denies fevers, chills, nausea, vomiting, cough or shortness of breath.  He states he has not yet heard from ID.  However, review of referral note indicates that a message was left on his phone earlier today.  Kodi states he will check his message and will call them back.  He completed his antibiotics 2 days ago.      REVIEW OF SYSTEMS:   Review of Systems   Constitutional: Negative for chills and fever.        States he is eating well, appetite normal   Respiratory: Negative for cough and shortness of breath.    Cardiovascular: Positive for leg swelling. Negative for chest pain and claudication.        Chronic swelling in legs, for several years   Gastrointestinal: Negative for constipation, diarrhea, nausea and vomiting.   Genitourinary: Negative for dysuria.   Musculoskeletal: Positive for joint pain. Negative for myalgias.        Chronic   Psychiatric/Behavioral: Positive for depression. The patient is not nervous/anxious.        PHYSICAL EXAMINATION:   /81   Pulse 66   Temp 36.7 °C (98.1 °F)   Resp 20   SpO2 97%     Physical Exam   Constitutional: He is oriented to person, place, and time and well-developed, well-nourished, and in no distress.   Disheveled unkept   HENT:   Head: Normocephalic and atraumatic.   Eyes: Pupils are equal, round, and reactive to light. Conjunctivae and EOM are normal.   Neck: Neck supple.    Cardiovascular: Intact distal pulses.   Pulmonary/Chest: Effort normal.   Musculoskeletal: Normal range of motion.         General: Deformity and edema present.      Comments: Left charcot foot and ankle  1+pitting edema to bilateral LE   Neurological: He is alert and oriented to person, place, and time.   Skin: Skin is warm. There is erythema.   Large full-thickness wounds to bilateral lower extremities  Further wound flowsheet and photos   Psychiatric: Affect normal.   Patient lacks insight into his own healthcare issues, defers to his daughter to keep track of his medications and appointments       WOUND ASSESSMENT               Wound 06/05/20 Full Thickness Wound Leg Left LLE anterolateral (Active)   Wound Image    08/20/20 1515   Site Assessment Pink;Red 08/20/20 1515   Periwound Assessment Denuded;Edema 08/20/20 1515   Margins Attached edges 08/20/20 1515   Closure Secondary intention 07/15/20 1430   Drainage Amount Moderate 08/20/20 1515   Drainage Description Serosanguineous 08/20/20 1515   Treatments Cleansed;Topical Lidocaine;Provider debridement 08/20/20 1515   Wound Cleansing Foam Cleanser/Washcloth 08/20/20 1515   Periwound Protectant Stoma Powder;Barrier Paste 08/20/20 1515   Dressing Cleansing/Solutions Not Applicable 08/20/20 1515   Dressing Options Hydrofera Blue Ready;Absorbent Abdominal Pad;Unna Boot 08/20/20 1515   Dressing Changed Changed 08/20/20 1515   Dressing Status Clean;Dry;Intact 08/20/20 1515   Dressing Change/Treatment Frequency Monday, Wednesday, Friday, and As Needed 07/15/20 1430   Non-staged Wound Description Full thickness 08/20/20 1515   Wound Length (cm) 16.3 cm 08/20/20 1515   Wound Width (cm) 6.1 cm 08/20/20 1515   Wound Depth (cm) 0.1 cm 08/20/20 1515   Wound Surface Area (cm^2) 99.43 cm^2 08/20/20 1515   Wound Volume (cm^3) 9.94 cm^3 08/20/20 1515   Post-Procedure Length (cm) 16.4 cm 08/20/20 1515   Post-Procedure Width (cm) 6.1 cm 08/20/20 1515   Post-Procedure Depth (cm)  0.1 cm 08/20/20 1515   Post-Procedure Surface Area (cm^2) 100.04 cm^2 08/20/20 1515   Post-Procedure Volume (cm^3) 10 cm^3 08/20/20 1515   Wound Healing % 82 08/20/20 1515   Wound Bed Granulation (%) 70 % 08/05/20 1415   Wound Bed Epithelium (%) 0 % 07/29/20 1450   Wound Bed Slough (%) 30 % 08/05/20 1415   Wound Bed Eschar (%) 0 % 07/29/20 1450   Wound Bed Granulation (%) - Post-Procedure 100 % 06/12/20 0945   Wound Bed Slough % - (Post-Procedure) 5 % 06/05/20 0930   Tunneling (cm) 0 cm 08/20/20 1515   Undermining (cm) 0 cm 08/20/20 1515   Wound Odor None 08/20/20 1515   Pulses Left;2+;DP 06/05/20 0930   Exposed Structures None 08/20/20 1515       Wound 06/05/20 Full Thickness Wound Ankle LLE distal posteriomedial (Active)   Wound Image    08/20/20 1515   Site Assessment Red;Yellow 08/20/20 1515   Periwound Assessment Denuded;Edema;Maceration 08/20/20 1515   Margins Attached edges 08/20/20 1515   Closure Secondary intention 07/15/20 1430   Drainage Amount Large 08/20/20 1515   Drainage Description Serosanguineous 08/20/20 1515   Treatments Cleansed;Topical Lidocaine;Provider debridement 08/20/20 1515   Wound Cleansing Foam Cleanser/Washcloth 08/20/20 1515   Periwound Protectant Barrier Paste;Stoma Powder 08/20/20 1515   Dressing Cleansing/Solutions Not Applicable 08/20/20 1515   Dressing Options Hydrofera Blue Ready;Absorbent Abdominal Pad;Unna Boot 08/20/20 1515   Dressing Changed Changed 08/20/20 1515   Dressing Status Clean;Dry;Intact 08/20/20 1515   Dressing Change/Treatment Frequency Monday, Wednesday, Friday, and As Needed 07/15/20 1430   Non-staged Wound Description Full thickness 08/20/20 1515   Wound Length (cm) 2.4 cm 08/20/20 1515   Wound Width (cm) 5.3 cm 08/20/20 1515   Wound Depth (cm) 0.2 cm 08/20/20 1515   Wound Surface Area (cm^2) 12.72 cm^2 08/20/20 1515   Wound Volume (cm^3) 2.54 cm^3 08/20/20 1515   Post-Procedure Length (cm) 2.4 cm 08/20/20 1515   Post-Procedure Width (cm) 5.7 cm 08/20/20 1515    Post-Procedure Depth (cm) 0.2 cm 08/20/20 1515   Post-Procedure Surface Area (cm^2) 13.68 cm^2 08/20/20 1515   Post-Procedure Volume (cm^3) 2.74 cm^3 08/20/20 1515   Wound Healing % 57 08/20/20 1515   Wound Bed Granulation (%) 60 % 07/29/20 1450   Wound Bed Epithelium (%) 0 % 07/29/20 1450   Wound Bed Slough (%) 40 % 07/29/20 1450   Wound Bed Eschar (%) 0 % 07/29/20 1450   Wound Bed Granulation (%) - Post-Procedure 80 % 06/12/20 0945   Wound Bed Slough % - (Post-Procedure) 20 % 06/12/20 0945   Tunneling (cm) 0 cm 08/20/20 1515   Undermining (cm) 0 cm 08/20/20 1515   Wound Odor None 08/20/20 1515   Pulses Left;2+;DP 06/05/20 0930   Exposed Structures None 08/20/20 1515       Wound 06/05/20 Full Thickness Wound Leg Left LLE posteromedial (Active)   Wound Image    08/20/20 1515   Site Assessment Red;Yellow 08/20/20 1515   Periwound Assessment Edema;Denuded;Maceration 08/20/20 1515   Margins Attached edges 08/20/20 1515   Closure Secondary intention 07/15/20 1430   Drainage Amount Large 08/20/20 1515   Drainage Description Serosanguineous 08/20/20 1515   Treatments Cleansed;Topical Lidocaine;Provider debridement 08/20/20 1515   Wound Cleansing Foam Cleanser/Washcloth 08/20/20 1515   Periwound Protectant Barrier Paste;Stoma Powder 08/20/20 1515   Dressing Cleansing/Solutions Not Applicable 08/20/20 1515   Dressing Options Hydrofera Blue Ready;Absorbent Abdominal Pad;Unna Boot 08/20/20 1515   Dressing Changed Changed 08/20/20 1515   Dressing Status Clean;Dry;Intact 08/20/20 1515   Dressing Change/Treatment Frequency Monday, Wednesday, Friday, and As Needed 08/20/20 1515   Non-staged Wound Description Full thickness 08/20/20 1515   Wound Length (cm) 7.3 cm 08/20/20 1515   Wound Width (cm) 8.5 cm 08/20/20 1515   Wound Depth (cm) 0.2 cm 08/20/20 1515   Wound Surface Area (cm^2) 62.05 cm^2 08/20/20 1515   Wound Volume (cm^3) 12.41 cm^3 08/20/20 1515   Post-Procedure Length (cm) 6.1 cm 08/20/20 1515   Post-Procedure Width  (cm) 9 cm 08/20/20 1515   Post-Procedure Depth (cm) 0.2 cm 08/20/20 1515   Post-Procedure Surface Area (cm^2) 54.9 cm^2 08/20/20 1515   Post-Procedure Volume (cm^3) 10.98 cm^3 08/20/20 1515   Wound Healing % -64 08/20/20 1515   Wound Bed Granulation (%) 50 % 07/29/20 1450   Wound Bed Epithelium (%) 0 % 07/29/20 1450   Wound Bed Slough (%) 50 % 07/29/20 1450   Wound Bed Eschar (%) 0 % 07/29/20 1450   Wound Bed Granulation (%) - Post-Procedure 80 % 06/12/20 0945   Wound Bed Slough % - (Post-Procedure) 20 % 06/12/20 0945   Tunneling (cm) 0 cm 08/20/20 1515   Undermining (cm) 0 cm 08/20/20 1515   Wound Odor None 08/20/20 1515   Pulses 2+;Left;DP 06/05/20 0930   Exposed Structures None 08/20/20 1515       Wound 06/05/20 Full Thickness Wound Leg Right R anterolateral LE (Active)   Wound Image   08/20/20 1515   Site Assessment Brown;Yellow;Other (Comment) 08/20/20 1515   Periwound Assessment Edema;Fragile 08/20/20 1515   Margins Attached edges 08/20/20 1515   Closure Secondary intention 07/15/20 1430   Drainage Amount None 08/20/20 1515   Drainage Description Serosanguineous 08/05/20 1415   Treatments Cleansed;Topical Lidocaine;Provider debridement 08/05/20 1415   Wound Cleansing Foam Cleanser/Washcloth 08/20/20 1515   Periwound Protectant Barrier Paste;Stoma Powder 08/20/20 1515   Dressing Cleansing/Solutions Not Applicable 08/20/20 1515   Dressing Options Hydrofera Blue Ready;Unna Boot 08/20/20 1515   Dressing Changed New 08/20/20 1515   Dressing Status Clean;Dry;Intact 08/20/20 1515   Dressing Change/Treatment Frequency Every 72 hrs, and As Needed 07/15/20 1430   Non-staged Wound Description Full thickness 08/20/20 1515   Wound Length (cm) 3 cm 08/05/20 1415   Wound Width (cm) 1.6 cm 08/05/20 1415   Wound Depth (cm) 0.1 cm 08/05/20 1415   Wound Surface Area (cm^2) 4.8 cm^2 08/05/20 1415   Wound Volume (cm^3) 0.48 cm^3 08/05/20 1415   Post-Procedure Length (cm) 3.1 cm 08/05/20 1415   Post-Procedure Width (cm) 1.6 cm  08/05/20 1415   Post-Procedure Depth (cm) 0.1 cm 08/05/20 1415   Post-Procedure Surface Area (cm^2) 4.96 cm^2 08/05/20 1415   Post-Procedure Volume (cm^3) 0.5 cm^3 08/05/20 1415   Wound Healing % 90 08/05/20 1415   Wound Bed Granulation (%) 20 % 06/12/20 0945   Wound Bed Slough (%) 80 % 06/12/20 0945   Wound Bed Granulation (%) - Post-Procedure 60 % 06/12/20 0945   Wound Bed Slough % - (Post-Procedure) 40 % 06/12/20 0945   Tunneling (cm) 0 cm 08/20/20 1515   Undermining (cm) 0 cm 08/20/20 1515   Wound Odor None 08/20/20 1515   Pulses 2+;DP;Right 06/05/20 0930   Exposed Structures None 08/20/20 1515                           PROCEDURE: Excisional debridement of left lower extremity wounds   -2% viscous lidocaine applied topically to wound beds for approximately 10 minutes prior to debridement  -Curette used to excise thick layer of slough from wound beds.  Excisional debridement was performed to remove devitalized tissue until healthy, bleeding tissue was visualized. Total area debrided to 168.62 cm².  Tissue debrided into the subcutaneous layer.    -Bleeding controlled with manual pressure.    -Wounds cleansed with normal saline  -Wound culture collected, wound care completed by  RN, refer to flowsheet  -Patient tolerated the procedure well, without c/o pain or discomfort.         Pertinent Labs and Diagnostics:    Labs:     A1c:   Lab Results   Component Value Date/Time    HBA1C 5.7 (H) 01/23/2020 11:22 AM          VASCULAR STUDIES: BHUPINDER 12/27/2019   Right.    Doppler waveform of the common femoral artery is of high amplitude and    triphasic.    Doppler waveforms at the ankle are brisk and triphasic.    Ankle-brachial index is normal.       Left.    Could not perform ankle-brachial index due to large blisters/ulcers.   Normal toe-brachial index: 0.94   Doppler waveform of the common femoral artery is of high amplitude and    triphasic.    Biphasic waveforms seen at the ankle.     WOUND CULTURE:    DATE:  6/19/2020-culture collected in clinic  Culture Result  Abnormal     Beta Hemolytic Streptococcus group A   Heavy growth     Culture Result  Abnormal     Staphylococcus aureus   Moderate growth     Culture Result  Abnormal     Serratia marcescens   Moderate growth     Culture Result  Abnormal     Proteus mirabilis   Moderate growth     Culture Result  Abnormal     Pseudomonas species   Light growth   unable to isolate for sensitivity            ASSESSMENT AND PLAN:     1. Skin ulcer of left lower leg with fat layer exposed (HCC)  Comment: Patient has history of recurring ulcerations to left lower extremity.  Has undergone ablation procedures by Dr. White.  Discharged from [facility] in March 2020 due to healing, returned approximately 3 weeks later much worse.  Hospitalized from 4/16 until 5/21/2020 underwent multiple surgical debridements.    08/20/20: Wound continues to improve without VAC therapy.  Patient tolerating compression without any difficulty.  -Excisional debridement of wound in clinic today, medically necessary to promote wound healing.  -Patient to return to clinic weekly for assessment and debridement  -Patient to change dressing 1-2 times per week in between clinic visits    Wound care: Hydrofera Blue ready, absorbent abdominal pad, Unna boot     2. Skin ulcer of multiple sites of right lower extremity with fat layer exposed (HCC)  Comments: First observed in clinic on 6/5/2020, on initial evaluation. Patient is not certain when or how they occurred.  There is no documentation of wounds to this leg during recent hospitalization, so presumably they occurred after he went home.    08/20/20: Wounds are nearly resolved covered with stable dry brown tissue, no periwound erythema, edema, or drainage.  -Wound assessed, no need for debridement  -Continue with compression to manage edema     Wound care: Hydrofera Blue ready, Hydrofiber x2, Unna boot    3. Chronic venous stasis  Comments: Hemosiderin staining,  brawny edema, scarring- findings consistent with CVI.  Pt established at [facility], underwent ablation procedures in 2019.     -Consider referral back to vascular if wounds failed to progress or deteriorate.    4. Acquired deformity of left ankle and foot  Comments: Charcot deformity of foot and ankle.  Very little ROM of ankle.  Patient was referred to orthopedics previously, however did not make appointment.    -Patient will need to follow-up with orthopedics once wounds are healed.    5.  Wound infection     8/20/2020: Patient completed p.o. antibiotics 2 days ago.  Has not yet scheduled with ID.  Apparently there is a message on his phone, left today.  -No signs or symptoms of infection today  -Patient to schedule with ID.        PATIENT EDUCATION  - Pt advised to go to ER for any increased redness, swelling, drainage or odor, or if he develops fever, chills, nausea or vomiting.        Please note that this note may have been created using voice recognition software. I have worked with technical experts from [facility] to optimize the interface.  I have made every reasonable attempt to correct obvious errors, but there may be errors of grammar and possibly content that I did not discover before finalizing the note.    N

## 2020-08-20 NOTE — LETTER
August 20, 2020    To: [facility]    Re: Goran Chavez (Date of birth 1955)    Wound Care Order:    · Remove old dressings.  · Cleanse right and left leg wounds with normal saline and gauze. Pat dry.  · Apply zinc barrier cream to sandrine-wounds.  · For LLE: Cut slits into Hydrofera Blue Ready (to allow drainage to get through plastic backing), then apply to wounds. Cover Hydrofera Blue with ABD pads. Secure ABD pads with roll gauze if necessary.  · For RLE: Apply Hydrofera Blue Ready over wound.  · Apply Unna Boots to left and right lower legs. Ensure that top of Unna Boots are no more than two inches below the bend of the knee.    · Home Health to see patient twice per week for dressing changes.            ______________________________________  KEV Mead

## 2020-08-21 NOTE — NON-PROVIDER
Home Health orders filled out and faxed to [facility] at [phone].

## 2020-08-26 ENCOUNTER — OFFICE VISIT (OUTPATIENT)
Dept: WOUND CARE | Facility: MEDICAL CENTER | Age: 65
End: 2020-08-26
Attending: NURSE PRACTITIONER
Payer: MEDICARE

## 2020-08-26 VITALS
SYSTOLIC BLOOD PRESSURE: 144 MMHG | DIASTOLIC BLOOD PRESSURE: 95 MMHG | OXYGEN SATURATION: 98 % | RESPIRATION RATE: 16 BRPM | HEART RATE: 62 BPM | TEMPERATURE: 98.1 F

## 2020-08-26 DIAGNOSIS — L97.922 SKIN ULCER OF LEFT LOWER LEG WITH FAT LAYER EXPOSED (HCC): ICD-10-CM

## 2020-08-26 DIAGNOSIS — M21.962 ACQUIRED DEFORMITY OF LEFT ANKLE AND FOOT: ICD-10-CM

## 2020-08-26 DIAGNOSIS — L08.9 WOUND INFECTION: ICD-10-CM

## 2020-08-26 DIAGNOSIS — L97.912 SKIN ULCER OF MULTIPLE SITES OF RIGHT LOWER EXTREMITY WITH FAT LAYER EXPOSED (HCC): ICD-10-CM

## 2020-08-26 DIAGNOSIS — T14.8XXA WOUND INFECTION: ICD-10-CM

## 2020-08-26 DIAGNOSIS — I87.8 CHRONIC VENOUS STASIS: ICD-10-CM

## 2020-08-26 PROCEDURE — 11042 DBRDMT SUBQ TIS 1ST 20SQCM/<: CPT

## 2020-08-26 PROCEDURE — 11045 DBRDMT SUBQ TISS EACH ADDL: CPT | Performed by: NURSE PRACTITIONER

## 2020-08-26 PROCEDURE — 11045 DBRDMT SUBQ TISS EACH ADDL: CPT

## 2020-08-26 PROCEDURE — 11042 DBRDMT SUBQ TIS 1ST 20SQCM/<: CPT | Performed by: NURSE PRACTITIONER

## 2020-08-26 ASSESSMENT — ENCOUNTER SYMPTOMS
FEVER: 0
MYALGIAS: 0
VOMITING: 0
DIARRHEA: 0
DEPRESSION: 1
SHORTNESS OF BREATH: 0
NAUSEA: 0
NERVOUS/ANXIOUS: 0
CONSTIPATION: 0
CLAUDICATION: 0
COUGH: 0
CHILLS: 0

## 2020-08-26 ASSESSMENT — PAIN SCALES - GENERAL: PAINLEVEL: 6=MODERATE PAIN

## 2020-08-26 NOTE — PATIENT INSTRUCTIONS
Avoid prolonged standing or sitting without elevating your legs.  - Multilayer compression wrap (Unna's Boots) to both legs. Do not get wet and keep on for the week. Only remove if temperature or sensation changes.   If compression needs to be removed, un-wrap it do not cut it off.     Should you experience any significant changes in your wound(s), such as infection (redness, swelling, localized heat, increased pain, fever > 101 F, chills) or have any questions regarding your home care instructions, please contact the wound center at [phone]. If after hours, contact your primary care physician or go to the hospital emergency room.   Keep dressing clean, dry and covered while bathing. Only change dressing if it becomes over saturated, soiled or falls off.

## 2020-08-26 NOTE — PROGRESS NOTES
Provider Encounter- Full Thickness wound    HISTORY OF PRESENT ILLNESS  Wound History:    START OF CARE IN CLINIC: 5/20/2020               REFERRING PROVIDER: KEV Keith                 WOUND ETIOLOGY: venous,  likely mixed etiology              LOCATION: Left lower leg, multiple wounds                                   Right lower leg, multiple wounds-first observed in clinic on initial evaluation, 6/5/2020                HISTORY: Patient is very well-known to this clinic from previous treatment of wounds to his left lower extremity.  He was discharged from the clinic in March 2020 due to full resolution of his wound.  He returned to the clinic approximately 3 weeks later, on 4/16, with cellulitis, edema, and large open wounds to his left lower leg, and was sent directly to [facility] emergency room.  He was admitted for IV antibiotics and surgical intervention.  On 4/22/2020, he underwent an I&D of his left lower extremity, fasciotomy, and VAC placement.  In the following weeks he underwent surgery 3 more times for irrigation and debridement.  He was discharged home on 5/21, on the VAC, with home health and a referral to [facility].       Pertinent Medical History:  Anxiety, Charcot's joint of left foot, non-diabetic (3/21/2016), Chronic congestive heart failure (HCC) (11/16/2017), Hepatitis C, chronic (HCC), Hypertension, Migraine, Polysubstance abuse (HCC) (3/8/2018), Tobacco use (4/18/2016), Ulcer of left lower extremity with necrosis of muscle (HCC) (3/21/2016), and Venous stasis ulcer ([facility]) (2017).                ETIOLOGY HISTORY:  Vascular Surgeon: Dr. White. Compression Circ-aid. Varicose Veins none visible     TOBACCO USE: Patient denies; patient also denies alcohol and recreational drug use    Patient's problem list, allergies, and current medications reviewed and updated in Epic    Interval History:  6/5/2020: Initial clinic visit with KEV Brantley.  Patient returns to clinic after a  "long hospitalization.  The VAC was held by home health due to severe maceration and deterioration of wound beds.  He also presents today with new wounds to his right lower extremity.  He states he does not know how or when these occurred, states, \"they just popped up\".  He is uncertain whether or not he has follow-up appointments with his surgeon.  He does not know what medications he is on.      6/12/2020 : Clinic visit with KEV Brantley.  Goran states he is feeling well today,denies fevers, chills, nausea, vomiting, cough or shortness of breath.  Condition of his periwound skin is significantly improved.  I would like to restart VAC, however he did not bring the VAC with him, and that has already been on for 2-1/2 weeks.  Will likely need to be reordered.  [facility] continues to see him several times per week for dressing changes.  He is tolerating compression without any difficulty.    6/19/2020 : Clinic visit with KEV Brantley.   Goran states he is feeling well today,denies fevers, chills, nausea, vomiting, cough or shortness of breath.  He brought his VAC with him today.  However,  too much slough on wound beds to consider re-applying.  [facility] continues to see him 2 times per week.  I have asked him to bring the VAC with him again next week, and will hopefully place it at that time.  Per patient, dressings are saturated between dressing changes.    6/26/2020 : Clinic visit with KEV Brantley.   Goran states he is feeling well today,denies fevers, chills, nausea, vomiting, cough or shortness of breath.  He brought his VAC with him again today, however we are not able to put it on as he is still having twice drainage.  Sensitivities from culture still pending.  However I did start him on Augmentin today which we are able to obtain from [facility] prior to his leaving the clinic today and had him take one before he left.  [facility] is been seen 2 times per week in between clinic visits.  " We will try and get them to see him over the weekend to do an additional dressing change.  Will adjust antibiotics once sensitivities are known.  Hopefully start VAC next clinic visit.    7/2/2020 : Clinic visit with KEV Brantley.    Goran states he is feeling well today,denies fevers, chills, nausea, vomiting, cough or shortness of breath.  His VAC is here in the clinic, however he does not have another home health appointment until Monday, 4 days from now.  He has been taking Augmentin as prescribed last week.  I informed him that I added Bactrim DS after I got sensitivity results, and that it should be waiting for him at his pharmacy.    7/9/2020: Clinic visit with KEV Luna. Patient states that they are feeling well today.  Patient denies fever, chills, nausea, vomiting, lightheadedness, dizziness, shortness of breath and chest pain.  All wound beds debrided in clinic today patient's wounds continue to progress beefy red granulation tissue to left lower extremity lateral wound tendon is being covered with granulation tissue continue with wound VAC and current plan of care.    7/15/2020 : Clinic visit with KEV Brantley.  Goran states he is feeling well today,denies fevers, chills, nausea, vomiting, cough or shortness of breath.  Home health continues to see him in between clinic visits.  He states he has not been smoking, drinking or using any street drugs.    7/22/2020 : Clinic visit with KEV Brantley.   Goran states he is feeling well today,denies fevers, chills, nausea, vomiting, cough or shortness of breath.  Home health continues to see him in between clinic visits.    7/29/2020: Clinic visit with KEV Luna. Patient states that they are feeling well today.  Patient denies fever, chills, nausea, vomiting, lightheadedness, dizziness, shortness of breath and chest pain.    8/5/2020: Clinic visit with KEV Luna. Patient states that they are feeling  well today.  Patient denies fever, chills, nausea, vomiting, lightheadedness, dizziness, shortness of breath and chest pain.  Patient's wounds are foul-smelling today.  Patient had positive culture we have already ordered the patient Cipro and Augmentin patient reports that he got my message from last night.  Patient reports that he is can  antibiotics tonight in the ED initiate them.  Patient has been referred to infectious disease due to multiple bacterial growth and for their expert opinion.    8/20/2020 : Clinic visit with KEV Brantley.  Goran states he is feeling well, denies fevers, chills, nausea, vomiting, cough or shortness of breath.  He states he has not yet heard from ID.  However, review of referral note indicates that a message was left on his phone earlier today.  Kodi states he will check his message and will call them back.  He completed his antibiotics 2 days ago.      REVIEW OF SYSTEMS:   Review of Systems   Constitutional: Negative for chills and fever.        States he is eating well, appetite normal   Respiratory: Negative for cough and shortness of breath.    Cardiovascular: Positive for leg swelling. Negative for chest pain and claudication.        Chronic swelling in legs, for several years   Gastrointestinal: Negative for constipation, diarrhea, nausea and vomiting.   Genitourinary: Negative for dysuria.   Musculoskeletal: Positive for joint pain. Negative for myalgias.        Chronic   Psychiatric/Behavioral: Positive for depression. The patient is not nervous/anxious.        PHYSICAL EXAMINATION:   /95 (BP Location: Left arm, Patient Position: Sitting)   Pulse 62   Temp 36.7 °C (98.1 °F) (Temporal)   Resp 16   SpO2 98%     Physical Exam   Constitutional: He is oriented to person, place, and time and well-developed, well-nourished, and in no distress.   Disheveled unkept   HENT:   Head: Normocephalic and atraumatic.   Eyes: Pupils are equal, round, and reactive to  light. Conjunctivae and EOM are normal.   Neck: Neck supple.   Cardiovascular: Intact distal pulses.   Pulmonary/Chest: Effort normal.   Musculoskeletal: Normal range of motion.         General: Deformity and edema present.      Comments: Left charcot foot and ankle  1+pitting edema to bilateral LE   Neurological: He is alert and oriented to person, place, and time.   Skin: Skin is warm. There is erythema.   Large full-thickness wounds to bilateral lower extremities  Further wound flowsheet and photos   Psychiatric: Affect normal.   Patient lacks insight into his own healthcare issues, defers to his daughter to keep track of his medications and appointments       WOUND ASSESSMENT             Wound 06/05/20 Full Thickness Wound Leg Left LLE anterolateral (Active)   Wound Image   08/26/20 1300   Site Assessment Pink;Red 08/26/20 1300   Periwound Assessment Denuded;Edema 08/26/20 1300   Margins Attached edges 08/26/20 1300   Closure Secondary intention 08/26/20 1300   Drainage Amount Moderate 08/26/20 1300   Drainage Description Serosanguineous 08/26/20 1300   Treatments Cleansed 08/26/20 1300   Wound Cleansing Foam Cleanser/Washcloth 08/26/20 1300   Periwound Protectant Stoma Powder;Barrier Paste 08/26/20 1300   Dressing Cleansing/Solutions Not Applicable 08/26/20 1300   Dressing Options Absorbent Abdominal Pad;Unna Boot;Hydrofiber Silver 08/26/20 1300   Dressing Changed New 08/26/20 1300   Dressing Status Clean;Dry;Intact 08/26/20 1300   Dressing Change/Treatment Frequency Monday, Wednesday, Friday, and As Needed 07/15/20 1430   Non-staged Wound Description Full thickness 08/26/20 1300   Wound Length (cm) 14.5 cm 08/26/20 1300   Wound Width (cm) 6.3 cm 08/26/20 1300   Wound Depth (cm) 0.2 cm 08/26/20 1300   Wound Surface Area (cm^2) 91.35 cm^2 08/26/20 1300   Wound Volume (cm^3) 18.27 cm^3 08/26/20 1300   Post-Procedure Length (cm) 16.4 cm 08/20/20 1515   Post-Procedure Width (cm) 6.1 cm 08/20/20 1515    Post-Procedure Depth (cm) 0.1 cm 08/20/20 1515   Post-Procedure Surface Area (cm^2) 100.04 cm^2 08/20/20 1515   Post-Procedure Volume (cm^3) 10 cm^3 08/20/20 1515   Wound Healing % 67 08/26/20 1300   Wound Bed Granulation (%) 100 % 08/26/20 1300   Wound Bed Epithelium (%) 0 % 07/29/20 1450   Wound Bed Slough (%) 30 % 08/05/20 1415   Wound Bed Eschar (%) 0 % 07/29/20 1450   Wound Bed Granulation (%) - Post-Procedure 100 % 06/12/20 0945   Wound Bed Slough % - (Post-Procedure) 5 % 06/05/20 0930   Tunneling (cm) 0 cm 08/26/20 1300   Undermining (cm) 0 cm 08/26/20 1300   Wound Odor None 08/26/20 1300   Pulses Left;2+;DP 06/05/20 0930   Exposed Structures None 08/26/20 1300       Wound 06/05/20 Full Thickness Wound Ankle LLE distal posteriomedial (Active)   Wound Image    08/26/20 1300   Site Assessment Red;Yellow 08/26/20 1300   Periwound Assessment Denuded;Edema;Maceration 08/26/20 1300   Margins Attached edges 08/26/20 1300   Closure Secondary intention 08/26/20 1300   Drainage Amount Large 08/26/20 1300   Drainage Description Serosanguineous 08/26/20 1300   Treatments Cleansed;Provider debridement 08/26/20 1300   Wound Cleansing Foam Cleanser/Washcloth 08/26/20 1300   Periwound Protectant Barrier Paste;Stoma Powder 08/26/20 1300   Dressing Cleansing/Solutions Not Applicable;Normal Saline 08/26/20 1300   Dressing Options Absorbent Abdominal Pad;Unna Boot;Hydrofiber Silver;Super Absorbent Pad;Coban 08/26/20 1300   Dressing Changed New 08/26/20 1300   Dressing Status Clean;Dry;Intact 08/26/20 1300   Dressing Change/Treatment Frequency Monday, Wednesday, Friday, and As Needed 07/15/20 1430   Non-staged Wound Description Full thickness 08/26/20 1300   Wound Length (cm) 2.2 cm 08/26/20 1300   Wound Width (cm) 4.5 cm 08/26/20 1300   Wound Depth (cm) 0.2 cm 08/26/20 1300   Wound Surface Area (cm^2) 9.9 cm^2 08/26/20 1300   Wound Volume (cm^3) 1.98 cm^3 08/26/20 1300   Post-Procedure Length (cm) 2.3 cm 08/26/20 1300    Post-Procedure Width (cm) 5 cm 08/26/20 1300   Post-Procedure Depth (cm) 0.3 cm 08/26/20 1300   Post-Procedure Surface Area (cm^2) 11.5 cm^2 08/26/20 1300   Post-Procedure Volume (cm^3) 3.45 cm^3 08/26/20 1300   Wound Healing % 66 08/26/20 1300   Wound Bed Granulation (%) 70 % 08/26/20 1300   Wound Bed Epithelium (%) 0 % 07/29/20 1450   Wound Bed Slough (%) 30 % 08/26/20 1300   Wound Bed Eschar (%) 0 % 07/29/20 1450   Wound Bed Granulation (%) - Post-Procedure 100 % 08/26/20 1300   Wound Bed Slough % - (Post-Procedure) 20 % 06/12/20 0945   Tunneling (cm) 0 cm 08/26/20 1300   Undermining (cm) 0 cm 08/26/20 1300   Wound Odor None 08/20/20 1515   Pulses Left;2+;DP 06/05/20 0930   Exposed Structures None 08/26/20 1300       Wound 06/05/20 Full Thickness Wound Leg Left LLE posteromedial (Active)   Wound Image    08/26/20 1300   Site Assessment Red;Yellow 08/26/20 1300   Periwound Assessment Edema;Denuded;Maceration 08/26/20 1300   Margins Attached edges 08/26/20 1300   Closure Secondary intention 08/26/20 1300   Drainage Amount Large 08/26/20 1300   Drainage Description Serosanguineous 08/26/20 1300   Treatments Cleansed;Provider debridement 08/26/20 1300   Wound Cleansing Foam Cleanser/Washcloth 08/26/20 1300   Periwound Protectant Barrier Paste;Stoma Powder 08/26/20 1300   Dressing Cleansing/Solutions Not Applicable;Normal Saline 08/26/20 1300   Dressing Options Unna Boot;Coban;Hydrofiber Silver;Super Absorbent Pad 08/26/20 1300   Dressing Changed New 08/26/20 1300   Dressing Status Clean;Dry;Intact 08/26/20 1300   Dressing Change/Treatment Frequency Monday, Wednesday, Friday, and As Needed 08/26/20 1300   Non-staged Wound Description Full thickness 08/26/20 1300   Wound Length (cm) 6.5 cm 08/26/20 1300   Wound Width (cm) 8.5 cm 08/26/20 1300   Wound Depth (cm) 0.2 cm 08/26/20 1300   Wound Surface Area (cm^2) 55.25 cm^2 08/26/20 1300   Wound Volume (cm^3) 11.05 cm^3 08/26/20 1300   Post-Procedure Length (cm) 7 cm  08/26/20 1300   Post-Procedure Width (cm) 8.9 cm 08/26/20 1300   Post-Procedure Depth (cm) 0.4 cm 08/26/20 1300   Post-Procedure Surface Area (cm^2) 62.3 cm^2 08/26/20 1300   Post-Procedure Volume (cm^3) 24.92 cm^3 08/26/20 1300   Wound Healing % -46 08/26/20 1300   Wound Bed Granulation (%) 70 % 08/26/20 1300   Wound Bed Epithelium (%) 0 % 07/29/20 1450   Wound Bed Slough (%) 30 % 08/26/20 1300   Wound Bed Eschar (%) 0 % 07/29/20 1450   Wound Bed Granulation (%) - Post-Procedure 100 % 08/26/20 1300   Wound Bed Slough % - (Post-Procedure) 20 % 06/12/20 0945   Tunneling (cm) 0 cm 08/26/20 1300   Undermining (cm) 0 cm 08/26/20 1300   Wound Odor None 08/26/20 1300   Pulses 2+;Left;DP 06/05/20 0930   Exposed Structures None 08/26/20 1300       Wound 06/05/20 Full Thickness Wound Leg Right R anterolateral LE (Active)   Wound Image    08/26/20 1300   Site Assessment Brown;Yellow;Other (Comment);Dry 08/26/20 1300   Periwound Assessment Edema;Fragile 08/20/20 1515   Margins Attached edges 08/26/20 1300   Closure Secondary intention 08/26/20 1300   Drainage Amount Small 08/26/20 1300   Drainage Description Serous 08/26/20 1300   Treatments Cleansed 08/26/20 1300   Wound Cleansing Foam Cleanser/Washcloth 08/26/20 1300   Periwound Protectant Barrier Paste;Stoma Powder 08/26/20 1300   Dressing Cleansing/Solutions Normal Saline 08/26/20 1300   Dressing Options Unna Boot;Viscopaste;Coban 08/26/20 1300   Dressing Changed Changed 08/26/20 1300   Dressing Status Clean;Dry;Intact 08/26/20 1300   Dressing Change/Treatment Frequency Every 72 hrs, and As Needed 07/15/20 1430   Non-staged Wound Description Full thickness 08/20/20 1515   Wound Length (cm) 3 cm 08/05/20 1415   Wound Width (cm) 1.6 cm 08/05/20 1415   Wound Depth (cm) 0.1 cm 08/05/20 1415   Wound Surface Area (cm^2) 4.8 cm^2 08/05/20 1415   Wound Volume (cm^3) 0.48 cm^3 08/05/20 1415   Post-Procedure Length (cm) 3.1 cm 08/05/20 1415   Post-Procedure Width (cm) 1.6 cm  08/05/20 1415   Post-Procedure Depth (cm) 0.1 cm 08/05/20 1415   Post-Procedure Surface Area (cm^2) 4.96 cm^2 08/05/20 1415   Post-Procedure Volume (cm^3) 0.5 cm^3 08/05/20 1415   Wound Healing % 90 08/05/20 1415   Wound Bed Granulation (%) 20 % 06/12/20 0945   Wound Bed Slough (%) 80 % 06/12/20 0945   Wound Bed Granulation (%) - Post-Procedure 60 % 06/12/20 0945   Wound Bed Slough % - (Post-Procedure) 40 % 06/12/20 0945   Tunneling (cm) 0 cm 08/20/20 1515   Undermining (cm) 0 cm 08/20/20 1515   Wound Odor None 08/20/20 1515   Pulses 2+;DP;Right 06/05/20 0930   Exposed Structures None 08/20/20 1515       PROCEDURE: Excisional debridement of left lower extremity wounds   -2% viscous lidocaine applied topically to wound beds for approximately 10 minutes prior to debridement  -Curette used to excise thick layer of slough from wound beds.  The largest wound, to the lateral lower leg is clean today, no need for debridement.  Excisional debridement was performed to remove devitalized tissue from all other wounds until healthy, bleeding tissue was visualized.  Total area debrided approximately 30 cm² tissue debrided into the subcutaneous layer.    -Bleeding controlled with manual pressure.    -Wounds cleansed with normal saline  -Wound culture collected, wound care completed by  RN, refer to flowsheet  -Patient tolerated the procedure well, without c/o pain or discomfort.         Pertinent Labs and Diagnostics:    Labs:     A1c:   Lab Results   Component Value Date/Time    HBA1C 5.7 (H) 01/23/2020 11:22 AM          VASCULAR STUDIES: BHUPINDER 12/27/2019   Right.    Doppler waveform of the common femoral artery is of high amplitude and    triphasic.    Doppler waveforms at the ankle are brisk and triphasic.    Ankle-brachial index is normal.       Left.    Could not perform ankle-brachial index due to large blisters/ulcers.   Normal toe-brachial index: 0.94   Doppler waveform of the common femoral artery is of high amplitude and     triphasic.    Biphasic waveforms seen at the ankle.     WOUND CULTURE:    DATE: 6/19/2020-culture collected in clinic  Culture Result  Abnormal     Beta Hemolytic Streptococcus group A   Heavy growth     Culture Result  Abnormal     Staphylococcus aureus   Moderate growth     Culture Result  Abnormal     Serratia marcescens   Moderate growth     Culture Result  Abnormal     Proteus mirabilis   Moderate growth     Culture Result  Abnormal     Pseudomonas species   Light growth   unable to isolate for sensitivity            ASSESSMENT AND PLAN:     1. Skin ulcer of left lower leg with fat layer exposed (HCC)  Comment: Patient has history of recurring ulcerations to left lower extremity.  Has undergone ablation procedures by Dr. White.  Discharged from [facility] in March 2020 due to healing, returned approximately 3 weeks later much worse.  Hospitalized from 4/16 until 5/21/2020 underwent multiple surgical debridements.    08/26/20: Wounds continue to improve without VAC therapy.  The largest wound to the lateral leg did not require debridement today.  Total area has decreased since last assessment.  Patient tolerating compression without any difficulty.  -Excisional debridement of wounds in clinic today, medically necessary to promote wound healing.  -Patient to return to clinic weekly for assessment and debridement  -Patient to change dressing 1-2 times per week in between clinic visits    Wound care: Silver Hydrofiber to manage exudate and bioburden, absorbent abdominal pad, Unna boot     2. Skin ulcer of multiple sites of right lower extremity with fat layer exposed (HCC)  Comments: First observed in clinic on 6/5/2020, on initial evaluation. Patient is not certain when or how they occurred.  There is no documentation of wounds to this leg during recent hospitalization, so presumably they occurred after he went home.    08/26/20: Wounds are nearly resolved covered with stable dry brown tissue, no periwound  erythema, edema, or drainage.  -Wound assessed, no need for debridement  -Continue with compression to manage edema     Wound care:  Unna boot to manage  edema and promote healing    3. Chronic venous stasis  Comments: Hemosiderin staining, brawny edema, scarring- findings consistent with CVI.  Pt established at [facility], underwent ablation procedures in 2019.     -Consider referral back to vascular if wounds failed to progress or deteriorate.    4. Acquired deformity of left ankle and foot  Comments: Charcot deformity of foot and ankle.  Very little ROM of ankle.  Patient was referred to orthopedics previously, however did not make appointment.    -Patient will need to follow-up with orthopedics once wounds are healed.    5.  Wound infection     8/26/2020: Patient has an appointment with ID on 9/1  -No signs or symptoms of infection today  -Follow-up with ID        PATIENT EDUCATION  - Pt advised to go to ER for any increased redness, swelling, drainage or odor, or if he develops fever, chills, nausea or vomiting.        Please note that this note may have been created using voice recognition software. I have worked with technical experts from [employer] to optimize the interface.  I have made every reasonable attempt to correct obvious errors, but there may be errors of grammar and possibly content that I did not discover before finalizing the note.    N

## 2020-08-27 DIAGNOSIS — G47.00 INSOMNIA, UNSPECIFIED TYPE: ICD-10-CM

## 2020-08-27 RX ORDER — AMITRIPTYLINE HYDROCHLORIDE 25 MG/1
TABLET, FILM COATED ORAL
Qty: 30 TAB | Refills: 5 | Status: SHIPPED | OUTPATIENT
Start: 2020-08-27 | End: 2021-05-18

## 2020-09-01 ENCOUNTER — OFFICE VISIT (OUTPATIENT)
Dept: INFECTIOUS DISEASES | Facility: MEDICAL CENTER | Age: 65
End: 2020-09-01
Payer: MEDICARE

## 2020-09-01 VITALS
TEMPERATURE: 98.3 F | OXYGEN SATURATION: 94 % | HEART RATE: 84 BPM | BODY MASS INDEX: 22.97 KG/M2 | RESPIRATION RATE: 16 BRPM | SYSTOLIC BLOOD PRESSURE: 114 MMHG | DIASTOLIC BLOOD PRESSURE: 70 MMHG | WEIGHT: 169.6 LBS | HEIGHT: 72 IN

## 2020-09-01 DIAGNOSIS — L97.929 VENOUS STASIS ULCER OF LEFT LOWER EXTREMITY (HCC): ICD-10-CM

## 2020-09-01 DIAGNOSIS — I83.029 VENOUS STASIS ULCER OF LEFT LOWER EXTREMITY (HCC): ICD-10-CM

## 2020-09-01 PROCEDURE — 99213 OFFICE O/P EST LOW 20 MIN: CPT | Performed by: INTERNAL MEDICINE

## 2020-09-01 ASSESSMENT — FIBROSIS 4 INDEX: FIB4 SCORE: 1.48

## 2020-09-01 ASSESSMENT — PAIN SCALES - GENERAL: PAINLEVEL: NO PAIN

## 2020-09-01 NOTE — PROGRESS NOTES
[facility] INFECTIOUS DISEASES CLINIC FOLLOW-UP NOTE     Date of Service: 9/1/2020    Chief Complaint: Follow-up for chronic wound    History of Present Illness:     Goran Chavez is a 65 y.o. male with history of hepatitis C, tobacco abuse, polysubstance abuse, left Charcot foot in a nondiabetic, chronic left lower extremity ulcer.  Patient was recently admitted in April/May 2024 with left lower extremity cellulitis and nonhealing wound with wound cultures positive for MRSA, group A strep, Pseudomonas, treated inpatient with Zyvox and meropenem, underwent I&D on 5/4/2020, no skin graft placed due to tendon exposure.  Patient was then being followed at the [facility] wound clinic.  Culture of the wound on 6/19/2020 grew MSSA, group A strep, Serratia, Proteus, Pseudomonas.  Patient was placed on Augmentin and Bactrim and referred to the infectious disease clinic.  Patient was seen by Jennifer ANGLUO on 7/7, noted to have no gross evidence of infection.    Patient continued follow-up with wound care.  On 8/5/2020 visit, foul order was noted from the wounds, along with periwound erythema.  Culture from 7/29 from the wound growing Pseudomonas, Proteus, group A strep, MSSA.  Patient was thus placed on ciprofloxacin and Augmentin.    Patient took these antibiotics for 10 days, notes improvement in swelling and redness of the medial smaller wounds and surroundings.  Patient has no fevers or chills, no active discharge, no longer foul-smelling.    Per patient's daughter, he has had issues with this wound for more than a couple of years.  On 2 occasions now, after the wound heals, it reopens several months later with copious drainage.  Patient states he has had venous surgeries performed.  Compression stockings are helping with swelling.    Review of Systems:  All other systems reviewed and are negative expect as noted in HPI    Past Medical History:   Diagnosis Date   • Anxiety    • Charcot's joint of left foot,  non-diabetic 3/21/2016   • Hepatitis C, chronic (HCC)    • Hypertension    • Kidney disease    • Migraine    • Polysubstance abuse (HCC) 3/8/2018   • Tobacco use 2016   • Ulcer of left lower extremity with necrosis of muscle (HCC) 3/21/2016   • Venous stasis ulcer (HCC) 2017    bilateral lower extremity       Past Surgical History:   Procedure Laterality Date   • IRRIGATION & DEBRIDEMENT ORTHO Left 2020    Procedure: IRRIGATION AND DEBRIDEMENT, WOUND - LEG;  Surgeon: Barrie Israel M.D.;  Location: SURGERY [facility];  Service: Orthopedics   • IRRIGATION & DEBRIDEMENT ORTHO Left 2020    Procedure: IRRIGATION AND DEBRIDEMENT, WOUND - LEG;  Surgeon: Pasquale John M.D.;  Location: SURGERY [facility];  Service: Orthopedics   • IRRIGATION & DEBRIDEMENT ORTHO Left 2020    Procedure: IRRIGATION AND DEBRIDEMENT, WOUND-FOOT;  Surgeon: Barrie Israel M.D.;  Location: SURGERY [facility];  Service: Orthopedics   • IRRIGATION & DEBRIDEMENT ORTHO Left 2020    Procedure: IRRIGATION AND DEBRIDEMENT, WOUND - LEG;  Surgeon: Barrie Israel M.D.;  Location: SURGERY [facility];  Service: Orthopedics   • TIBIA ORIF Right    • SHOULDER ORIF Left    • HAND SURGERY Left     due to infection   • PB DRAIN SKIN ABSCESS SIMPLE Left     leg, near the knee, remote       Family History   Problem Relation Age of Onset   • Lung Disease Mother 70         from COPD   • Hypertension Mother    • Other Father 82         from brain aneurysm after fall   • Cancer Neg Hx    • Heart Disease Neg Hx    • Stroke Neg Hx    • Diabetes Neg Hx        Social History     Socioeconomic History   • Marital status: Legally      Spouse name: Not on file   • Number of children: Not on file   • Years of education: Not on file   • Highest education level: Not on file   Occupational History   • Not on file   Social Needs   • Financial resource strain: Not hard at all   • Food insecurity     Worry:  Never true     Inability: Never true   • Transportation needs     Medical: No     Non-medical: No   Tobacco Use   • Smoking status: Former Smoker     Packs/day: 0.00     Years: 48.00     Pack years: 0.00     Types: Cigarettes     Quit date: 2018     Years since quittin.7   • Smokeless tobacco: Never Used   • Tobacco comment: states he is using 7mg nicotine patch   Substance and Sexual Activity   • Alcohol use: No     Alcohol/week: 7.2 oz     Types: 12 Cans of beer per week     Comment: history of alcoholism    • Drug use: Yes     Types: Marijuana, Inhaled, Methamphetamines     Comment: MJ every day, intermittent meth use   • Sexual activity: Not on file   Lifestyle   • Physical activity     Days per week: Not on file     Minutes per session: Not on file   • Stress: Not on file   Relationships   • Social connections     Talks on phone: Not on file     Gets together: Not on file     Attends [employer] service: Not on file     Active member of club or organization: Not on file     Attends meetings of clubs or organizations: Not on file     Relationship status: Not on file   • Intimate partner violence     Fear of current or ex partner: Not on file     Emotionally abused: Not on file     Physically abused: Not on file     Forced sexual activity: Not on file   Other Topics Concern   • Not on file   Social History Narrative   • Not on file       Allergies   Allergen Reactions   • Norco [Hydrocodone-Acetaminophen] Itching       Medications:  Current Outpatient Medications on File Prior to Visit   Medication Sig Dispense Refill   • amitriptyline (ELAVIL) 25 MG Tab TAKE 1 TABLET BY MOUTH AT BEDTIME AS NEEDED FOR SLEEP (INCREASED DOSE) 30 Tab 5   • acetaminophen (TYLENOL) 500 MG Tab Take 2 Tabs by mouth 3 times a day as needed. 180 Tab 2   • acetaminophen (TYLENOL) 500 MG Tab Take 1,000 mg by mouth 1 time daily as needed for Moderate Pain.     • sertraline (ZOLOFT) 100 MG Tab Take 1 Tab by mouth every day. 30 Tab 3    • ferrous gluconate (FERGON) 324 (38 Fe) MG Tab Take 1 Tab by mouth every morning with breakfast. 30 Tab 5   • ascorbic acid (VITAMIN C) 500 MG tablet Take 1 Tab by mouth every day. 30 Tab 5   • amLODIPine (NORVASC) 10 MG Tab Take 1 Tab by mouth every day. 30 Tab 5   • atorvastatin (LIPITOR) 40 MG Tab Take 1 Tab by mouth every day. 30 Tab 11   • gabapentin (NEURONTIN) 300 MG Cap TAKE 1 CAPSULE BY MOUTH EVERY DAY AT BEDTIME     • hydrOXYzine pamoate (VISTARIL) 50 MG Cap TAKE 1 TO 2 CAPSULES BY MOUTH EVERY 8 HOURS AS NEEDED FOR NAUSEA       No current facility-administered medications on file prior to visit.        Physical Exam:   Vital Signs: /70 (BP Location: Left arm, Patient Position: Sitting, BP Cuff Size: Adult)   Pulse 84   Temp 36.8 °C (98.3 °F) (Temporal)   Resp 16   Ht 1.829 m (6') Comment: patient reported  Wt 76.9 kg (169 lb 9.6 oz)   SpO2 94%   BMI 23.00 kg/m²   Vital signs reviewed  Constitutional: Patient is oriented to person, place, and time. Appears somewhat frail. No distress  Head: Atraumatic, normocephalic  Eyes: Conjunctivae normal, EOM intact. Pupils are equal, round, and reactive to light.   Mouth/Throat: Wearing a mask  Neck: Neck supple. No masses/lymphadenopathy  Cardiovascular: Normal rate, regular rhythm, normal S1S2 and intact distal pulses. No murmur, gallop, or friction rub. No pedal edema.  Pulmonary/Chest: No respiratory distress. Unlabored respiratory effort, lungs clear to auscultation. No wheezes or rales.   Abdominal: Soft, non tender. BS + x 4. No masses or hepatosplenomegaly.   Musculoskeletal: Left lower extremity lateral leg wound large, but with healthy granulation tissue at the base, no surrounding erythema, no active discharge, no increased warmth.  Left medial leg with 2 smaller wounds, granulation tissue at the base with minimal slough, surrounding erythema noted but no increased warmth, much improved compared to previous wound care pictures from 8/26, no  active discharge, not foul-smelling  Neurological: Alert and oriented to person, place, and time. No gross cranial nerve deficit. No focal neural deficit noted  Skin: As above. No rashes or embolic phenomena noted on exposed skin  Psychiatric: Pleasant.  Normal mood and affect. Behavior is normal.     LABS:  WBC   Date/Time Value Ref Range Status   05/21/2020 12:55 AM 8.7 4.8 - 10.8 K/uL Final     RBC   Date/Time Value Ref Range Status   05/21/2020 12:55 AM 2.38 (L) 4.70 - 6.10 M/uL Final     Hemoglobin   Date/Time Value Ref Range Status   05/21/2020 12:55 AM 7.3 (L) 14.0 - 18.0 g/dL Final     Hematocrit   Date/Time Value Ref Range Status   05/21/2020 12:55 AM 23.2 (L) 42.0 - 52.0 % Final     MCV   Date/Time Value Ref Range Status   05/21/2020 12:55 AM 97.5 81.4 - 97.8 fL Final     MCH   Date/Time Value Ref Range Status   05/21/2020 12:55 AM 30.7 27.0 - 33.0 pg Final     MCHC   Date/Time Value Ref Range Status   05/21/2020 12:55 AM 31.5 (L) 33.7 - 35.3 g/dL Final     MPV   Date/Time Value Ref Range Status   05/21/2020 12:55 AM 9.8 9.0 - 12.9 fL Final     Sodium   Date/Time Value Ref Range Status   05/19/2020 02:43  135 - 145 mmol/L Final     Potassium   Date/Time Value Ref Range Status   05/19/2020 02:43 AM 4.3 3.6 - 5.5 mmol/L Final     Chloride   Date/Time Value Ref Range Status   05/19/2020 02:43  96 - 112 mmol/L Final     Co2   Date/Time Value Ref Range Status   05/19/2020 02:43 AM 27 20 - 33 mmol/L Final     Glucose   Date/Time Value Ref Range Status   05/19/2020 02:43  (H) 65 - 99 mg/dL Final     Bun   Date/Time Value Ref Range Status   05/19/2020 02:43 AM 14 8 - 22 mg/dL Final     Creatinine   Date/Time Value Ref Range Status   05/19/2020 02:43 AM 0.83 0.50 - 1.40 mg/dL Final     Alkaline Phosphatase   Date/Time Value Ref Range Status   05/19/2020 02:43 AM 98 30 - 99 U/L Final     AST(SGOT)   Date/Time Value Ref Range Status   05/19/2020 02:43 AM 19 12 - 45 U/L Final     ALT(SGPT)    Date/Time Value Ref Range Status   05/19/2020 02:43 AM 13 2 - 50 U/L Final     Total Bilirubin   Date/Time Value Ref Range Status   05/19/2020 02:43 AM 0.4 0.1 - 1.5 mg/dL Final      No results found for: CPKTOTAL     MICRO:  Blood Culture   Date Value Ref Range Status   12/19/2018 No growth after 5 days of incubation.  Final         Latest pertinent labs were reviewed    IMAGING STUDIES:  As above    Assessment:   Goran Chavez is a 65 y.o. male with a history of treated hepatitis C, tobacco abuse, polysubstance abuse, left Charcot foot in a nondiabetic, chronic left lower extremity ulcer.  Patient was recently admitted in April/May 2024 with left lower extremity cellulitis and nonhealing wound with wound cultures positive for MRSA, group A strep, Pseudomonas, treated inpatient with Zyvox and meropenem, underwent I&D on 5/4/2020, no skin graft placed due to tendon exposure.  Patient was then being followed at the [facility] wound clinic.  Culture of the wound on 6/19/2020 grew MSSA, group A strep, Serratia, Proteus, Pseudomonas.  Patient was placed on Augmentin and Bactrim and referred to the infectious disease clinic.  Patient was seen by Jennifer ANGULO on 7/7, noted to have no gross evidence of infection.    Patient continued follow-up with wound care.  On 8/5/2020 visit, foul order was noted from the wounds, along with periwound erythema.  Culture from 7/29 from the wound growing Pseudomonas, Proteus, group A strep, MSSA.  Patient was thus placed on ciprofloxacin and Augmentin for 10 days.  On today's exam, this appears to have cleared up superimposed bacterial infection.  His chronic wounds remain with surrounding chronic erythema, but much improved compared to previous wound care images from 8/26.    Plan:   -Successfully treated superimposed bacterial infection of chronic left lower extremity wounds.  No further antibiotics indicated at this time  -Continue excellent wound care.  Patient is  colonized with multiple bacteria.  Recommend culturing only if evidence of superimposed bacterial infection with purulent discharge, increase in swelling and erythema.  Patient remains at risk for recurrent infections  -Patient's daughter provided history of multiple recurrences for more than 2 years after successful closing up of the wound each time with the appearance of the wound with copious drainage after several months.  Presumptive etiology at this time is chronic venous stasis ulcerations. Low suspicion for an underlying osteomyelitis on exam, but with this history reasonable to obtain an MRI to rule this out.  Will obtain MRI left leg.  Patient does have hardware right leg but underwent an MRI last year.    ID clinic follow-up as needed    Ronak Naylor M.D.    Please note that this dictation was created using voice recognition software. I have worked with technical experts from [employer] to optimize the interface.  I have made every reasonable attempt to correct obvious errors, but there may be errors of grammar and possibly content that I did not discover before finalizing the note.

## 2020-09-02 ENCOUNTER — OFFICE VISIT (OUTPATIENT)
Dept: WOUND CARE | Facility: MEDICAL CENTER | Age: 65
End: 2020-09-02
Attending: NURSE PRACTITIONER
Payer: MEDICARE

## 2020-09-02 VITALS
HEART RATE: 84 BPM | OXYGEN SATURATION: 97 % | SYSTOLIC BLOOD PRESSURE: 128 MMHG | DIASTOLIC BLOOD PRESSURE: 85 MMHG | RESPIRATION RATE: 18 BRPM | TEMPERATURE: 98.1 F

## 2020-09-02 DIAGNOSIS — I87.8 CHRONIC VENOUS STASIS: ICD-10-CM

## 2020-09-02 DIAGNOSIS — M21.962 ACQUIRED DEFORMITY OF LEFT ANKLE AND FOOT: ICD-10-CM

## 2020-09-02 DIAGNOSIS — T14.8XXA WOUND INFECTION: ICD-10-CM

## 2020-09-02 DIAGNOSIS — L08.9 WOUND INFECTION: ICD-10-CM

## 2020-09-02 DIAGNOSIS — L97.929 VENOUS STASIS ULCER OF LEFT LOWER EXTREMITY (HCC): ICD-10-CM

## 2020-09-02 DIAGNOSIS — I83.029 VENOUS STASIS ULCER OF LEFT LOWER EXTREMITY (HCC): ICD-10-CM

## 2020-09-02 DIAGNOSIS — L97.922 SKIN ULCER OF LEFT LOWER LEG WITH FAT LAYER EXPOSED (HCC): Primary | ICD-10-CM

## 2020-09-02 DIAGNOSIS — L97.912 SKIN ULCER OF MULTIPLE SITES OF RIGHT LOWER EXTREMITY WITH FAT LAYER EXPOSED (HCC): ICD-10-CM

## 2020-09-02 PROCEDURE — 11045 DBRDMT SUBQ TISS EACH ADDL: CPT | Performed by: NURSE PRACTITIONER

## 2020-09-02 PROCEDURE — 11042 DBRDMT SUBQ TIS 1ST 20SQCM/<: CPT | Performed by: NURSE PRACTITIONER

## 2020-09-02 PROCEDURE — 99211 OFF/OP EST MAY X REQ PHY/QHP: CPT | Mod: 25

## 2020-09-02 PROCEDURE — 11042 DBRDMT SUBQ TIS 1ST 20SQCM/<: CPT

## 2020-09-02 PROCEDURE — 11045 DBRDMT SUBQ TISS EACH ADDL: CPT

## 2020-09-02 PROCEDURE — 99213 OFFICE O/P EST LOW 20 MIN: CPT | Mod: 25 | Performed by: NURSE PRACTITIONER

## 2020-09-02 ASSESSMENT — ENCOUNTER SYMPTOMS
CHILLS: 0
COUGH: 0
VOMITING: 0
SHORTNESS OF BREATH: 0
CLAUDICATION: 0
DEPRESSION: 1
NAUSEA: 0
MYALGIAS: 0
DIARRHEA: 0
NERVOUS/ANXIOUS: 0
FEVER: 0
CONSTIPATION: 0

## 2020-09-02 NOTE — PATIENT INSTRUCTIONS
-Keep dressings clean, dry and covered while bathing. Only change dressings if they become over saturated, soiled or fall off.     -Avoid prolonged standing or sitting without elevating your legs.    -Remove your compression garments if you have severe pain, severe swelling, numbness, color change, or temperature change in your toes. If you need to remove your compression garments, do so by unrolling them. Do not cut the compression garments off, this is to prevent cutting yourself on accident.    -Should you experience any significant changes in your wound(s), such as infection (redness, swelling, localized heat, increased pain, fever > 101 F, chills) or have any questions regarding your home care instructions, please contact the wound center at [phone]. If after hours, contact your primary care physician or go to the hospital emergency room.

## 2020-09-02 NOTE — PROGRESS NOTES
Provider Encounter- Full Thickness wound    HISTORY OF PRESENT ILLNESS  Wound History:    START OF CARE IN CLINIC: 5/20/2020               REFERRING PROVIDER: KEV Keith                 WOUND ETIOLOGY: venous,  likely mixed etiology              LOCATION: Left lower leg, multiple wounds                                   Right lower leg, multiple wounds-first observed in clinic on initial evaluation, 6/5/2020.  Resolved 9/2/2020                HISTORY: Patient is very well-known to this clinic from previous treatment of wounds to his left lower extremity.  He was discharged from the clinic in March 2020 due to full resolution of his wound.  He returned to the clinic approximately 3 weeks later, on 4/16, with cellulitis, edema, and large open wounds to his left lower leg, and was sent directly to [facility] emergency room.  He was admitted for IV antibiotics and surgical intervention.  On 4/22/2020, he underwent an I&D of his left lower extremity, fasciotomy, and VAC placement.  In the following weeks he underwent surgery 3 more times for irrigation and debridement.  He was discharged home on 5/21, on the VAC, with home health and a referral to [facility].       Pertinent Medical History:  Anxiety, Charcot's joint of left foot, non-diabetic (3/21/2016), Chronic congestive heart failure (HCC) (11/16/2017), Hepatitis C, chronic (HCC), Hypertension, Migraine, Polysubstance abuse (HCC) (3/8/2018), Tobacco use (4/18/2016), Ulcer of left lower extremity with necrosis of muscle (HCC) (3/21/2016), and Venous stasis ulcer ([facility]) (2017).                ETIOLOGY HISTORY:  Vascular Surgeon: Dr. White. Compression Circ-aid. Varicose Veins none visible     TOBACCO USE: Patient denies; patient also denies alcohol and recreational drug use    Patient's problem list, allergies, and current medications reviewed and updated in Epic    Interval History:  6/5/2020: Initial clinic visit with KEV Brantley.  Patient  "returns to clinic after a long hospitalization.  The VAC was held by [facility] due to severe maceration and deterioration of wound beds.  He also presents today with new wounds to his right lower extremity.  He states he does not know how or when these occurred, states, \"they just popped up\".  He is uncertain whether or not he has follow-up appointments with his surgeon.  He does not know what medications he is on.      6/12/2020 : Clinic visit with KEV Brantley.  Goran states he is feeling well today,denies fevers, chills, nausea, vomiting, cough or shortness of breath.  Condition of his periwound skin is significantly improved.  I would like to restart VAC, however he did not bring the VAC with him, and that has already been on for 2-1/2 weeks.  Will likely need to be reordered.  [facility] continues to see him several times per week for dressing changes.  He is tolerating compression without any difficulty.    6/19/2020 : Clinic visit with KEV Brantley.   Goran states he is feeling well today,denies fevers, chills, nausea, vomiting, cough or shortness of breath.  He brought his VAC with him today.  However,  too much slough on wound beds to consider re-applying.  [facility] continues to see him 2 times per week.  I have asked him to bring the VAC with him again next week, and will hopefully place it at that time.  Per patient, dressings are saturated between dressing changes.    6/26/2020 : Clinic visit with KEV Brantley.   Goran states he is feeling well today,denies fevers, chills, nausea, vomiting, cough or shortness of breath.  He brought his VAC with him again today, however we are not able to put it on as he is still having twice drainage.  Sensitivities from culture still pending.  However I did start him on Augmentin today which we are able to obtain from [facility] prior to his leaving the clinic today and had him take one before he left.  [facility] is been seen 2 times per week " in between clinic visits.  We will try and get them to see him over the weekend to do an additional dressing change.  Will adjust antibiotics once sensitivities are known.  Hopefully start VAC next clinic visit.    7/2/2020 : Clinic visit with KEV Brantley.    Goran states he is feeling well today,denies fevers, chills, nausea, vomiting, cough or shortness of breath.  His VAC is here in the clinic, however he does not have another home health appointment until Monday, 4 days from now.  He has been taking Augmentin as prescribed last week.  I informed him that I added Bactrim DS after I got sensitivity results, and that it should be waiting for him at his pharmacy.    7/9/2020: Clinic visit with KEV Luna. Patient states that they are feeling well today.  Patient denies fever, chills, nausea, vomiting, lightheadedness, dizziness, shortness of breath and chest pain.  All wound beds debrided in clinic today patient's wounds continue to progress beefy red granulation tissue to left lower extremity lateral wound tendon is being covered with granulation tissue continue with wound VAC and current plan of care.    7/15/2020 : Clinic visit with KEV Brantley.  Goran states he is feeling well today,denies fevers, chills, nausea, vomiting, cough or shortness of breath.  Home health continues to see him in between clinic visits.  He states he has not been smoking, drinking or using any street drugs.    7/22/2020 : Clinic visit with KEV Brantley.   Goran states he is feeling well today,denies fevers, chills, nausea, vomiting, cough or shortness of breath.  Home health continues to see him in between clinic visits.    7/29/2020: Clinic visit with KEV Luna. Patient states that they are feeling well today.  Patient denies fever, chills, nausea, vomiting, lightheadedness, dizziness, shortness of breath and chest pain.    8/5/2020: Clinic visit with KEV Luna. Patient  states that they are feeling well today.  Patient denies fever, chills, nausea, vomiting, lightheadedness, dizziness, shortness of breath and chest pain.  Patient's wounds are foul-smelling today.  Patient had positive culture we have already ordered the patient Cipro and Augmentin patient reports that he got my message from last night.  Patient reports that he is can  antibiotics tonight in the ED initiate them.  Patient has been referred to infectious disease due to multiple bacterial growth and for their expert opinion.    8/20/2020 : Clinic visit with KEV Brantley.  Goran states he is feeling well, denies fevers, chills, nausea, vomiting, cough or shortness of breath.  He states he has not yet heard from ID.  However, review of referral note indicates that a message was left on his phone earlier today.  Kodi states he will check his message and will call them back.  He completed his antibiotics 2 days ago.    9/2/2020 : Clinic visit with KEV Brantley.   Goran states he is feeling well, denies fevers, chills, nausea, vomiting, cough or shortness of breath.  He was seen by ID yesterday, no new antibiotics ordered, however MRI ordered to assess for OM.  Goran has an appoint with his PCP tomorrow.      REVIEW OF SYSTEMS:   Review of Systems   Constitutional: Negative for chills and fever.        States he is eating well, appetite normal   Respiratory: Negative for cough and shortness of breath.    Cardiovascular: Positive for leg swelling. Negative for chest pain and claudication.        Chronic swelling in legs, for several years   Gastrointestinal: Negative for constipation, diarrhea, nausea and vomiting.   Genitourinary: Negative for dysuria.   Musculoskeletal: Positive for joint pain. Negative for myalgias.        Chronic   Psychiatric/Behavioral: Positive for depression. The patient is not nervous/anxious.        PHYSICAL EXAMINATION:   There were no vitals taken for this  visit.    Physical Exam   Constitutional: He is oriented to person, place, and time and well-developed, well-nourished, and in no distress.   Disheveled unkept   HENT:   Head: Normocephalic and atraumatic.   Eyes: Pupils are equal, round, and reactive to light. Conjunctivae and EOM are normal.   Neck: Neck supple.   Cardiovascular: Intact distal pulses.   Pulmonary/Chest: Effort normal.   Musculoskeletal: Normal range of motion.         General: Deformity and edema present.      Comments: Left charcot foot and ankle  1+pitting edema to bilateral LE   Neurological: He is alert and oriented to person, place, and time.   Skin: Skin is warm. There is erythema.   Large full-thickness wounds to bilateral lower extremities  Further wound flowsheet and photos   Psychiatric: Affect normal.   Patient lacks insight into his own healthcare issues, defers to his daughter to keep track of his medications and appointments       WOUND ASSESSMENT               Wound 06/05/20 Full Thickness Wound Leg Left LLE anterolateral (Active)   Wound Image    09/02/20 1330   Site Assessment Pink;Red;Yellow 09/02/20 1330   Periwound Assessment Denuded;Edema 09/02/20 1330   Margins Attached edges 09/02/20 1330   Closure Secondary intention 08/26/20 1300   Drainage Amount Moderate 09/02/20 1330   Drainage Description Serosanguineous 09/02/20 1330   Treatments Cleansed;Topical Lidocaine;Provider debridement 09/02/20 1330   Wound Cleansing Foam Cleanser/Washcloth 09/02/20 1330   Periwound Protectant Barrier Paste 09/02/20 1330   Dressing Cleansing/Solutions Not Applicable 08/26/20 1300   Dressing Options Hydrofiber Silver;Absorbent Abdominal Pad;Unna Boot 09/02/20 1330   Dressing Changed New 08/26/20 1300   Dressing Status Clean;Dry;Intact 08/26/20 1300   Dressing Change/Treatment Frequency Monday, Wednesday, Friday, and As Needed 07/15/20 1430   Non-staged Wound Description Full thickness 09/02/20 1330   Wound Length (cm) 14.5 cm 09/02/20 1330    Wound Width (cm) 5.1 cm 09/02/20 1330   Wound Depth (cm) 0.1 cm 09/02/20 1330   Wound Surface Area (cm^2) 73.95 cm^2 09/02/20 1330   Wound Volume (cm^3) 7.4 cm^3 09/02/20 1330   Post-Procedure Length (cm) 14.5 cm 09/02/20 1330   Post-Procedure Width (cm) 5.3 cm 09/02/20 1330   Post-Procedure Depth (cm) 0.1 cm 09/02/20 1330   Post-Procedure Surface Area (cm^2) 76.85 cm^2 09/02/20 1330   Post-Procedure Volume (cm^3) 7.68 cm^3 09/02/20 1330   Wound Healing % 86 09/02/20 1330   Wound Bed Granulation (%) 100 % 08/26/20 1300   Wound Bed Epithelium (%) 0 % 07/29/20 1450   Wound Bed Slough (%) 30 % 08/05/20 1415   Wound Bed Eschar (%) 0 % 07/29/20 1450   Wound Bed Granulation (%) - Post-Procedure 100 % 06/12/20 0945   Wound Bed Slough % - (Post-Procedure) 5 % 06/05/20 0930   Tunneling (cm) 0 cm 09/02/20 1330   Undermining (cm) 0 cm 09/02/20 1330   Wound Odor None 09/02/20 1330   Pulses Left;2+;DP 06/05/20 0930   Exposed Structures None 09/02/20 1330       Wound 06/05/20 Full Thickness Wound Ankle LLE distal posteriomedial (Active)   Wound Image    09/02/20 1330   Site Assessment Pink;Yellow 09/02/20 1330   Periwound Assessment Denuded;Edema 09/02/20 1330   Margins Attached edges 09/02/20 1330   Closure Secondary intention 08/26/20 1300   Drainage Amount Moderate 09/02/20 1330   Drainage Description Serosanguineous 09/02/20 1330   Treatments Cleansed;Topical Lidocaine;Provider debridement 09/02/20 1330   Wound Cleansing Foam Cleanser/Washcloth 09/02/20 1330   Periwound Protectant Barrier Paste 09/02/20 1330   Dressing Cleansing/Solutions Not Applicable;Normal Saline 08/26/20 1300   Dressing Options Hydrofiber Silver;Absorbent Abdominal Pad;Unna Boot 09/02/20 1330   Dressing Changed New 08/26/20 1300   Dressing Status Clean;Dry;Intact 08/26/20 1300   Dressing Change/Treatment Frequency Monday, Wednesday, Friday, and As Needed 07/15/20 1430   Non-staged Wound Description Full thickness 09/02/20 1330   Wound Length (cm) 4.7  cm 09/02/20 1330   Wound Width (cm) 4 cm 09/02/20 1330   Wound Depth (cm) 0.2 cm 09/02/20 1330   Wound Surface Area (cm^2) 18.8 cm^2 09/02/20 1330   Wound Volume (cm^3) 3.76 cm^3 09/02/20 1330   Post-Procedure Length (cm) 4.8 cm 09/02/20 1330   Post-Procedure Width (cm) 4 cm 09/02/20 1330   Post-Procedure Depth (cm) 0.2 cm 09/02/20 1330   Post-Procedure Surface Area (cm^2) 19.2 cm^2 09/02/20 1330   Post-Procedure Volume (cm^3) 3.84 cm^3 09/02/20 1330   Wound Healing % 36 09/02/20 1330   Wound Bed Granulation (%) 70 % 08/26/20 1300   Wound Bed Epithelium (%) 0 % 07/29/20 1450   Wound Bed Slough (%) 30 % 08/26/20 1300   Wound Bed Eschar (%) 0 % 07/29/20 1450   Wound Bed Granulation (%) - Post-Procedure 100 % 08/26/20 1300   Wound Bed Slough % - (Post-Procedure) 20 % 06/12/20 0945   Tunneling (cm) 0 cm 09/02/20 1330   Undermining (cm) 0 cm 09/02/20 1330   Wound Odor None 09/02/20 1330   Pulses Left;2+;DP 06/05/20 0930   Exposed Structures None 09/02/20 1330       Wound 06/05/20 Full Thickness Wound Leg Left LLE posteromedial (Active)   Wound Image    09/02/20 1330   Site Assessment Pink;Yellow 09/02/20 1330   Periwound Assessment Denuded;Edema 09/02/20 1330   Margins Attached edges 09/02/20 1330   Closure Secondary intention 08/26/20 1300   Drainage Amount Moderate 09/02/20 1330   Drainage Description Serosanguineous 09/02/20 1330   Treatments Cleansed;Topical Lidocaine;Provider debridement 09/02/20 1330   Wound Cleansing Foam Cleanser/Washcloth 09/02/20 1330   Periwound Protectant Barrier Paste 09/02/20 1330   Dressing Cleansing/Solutions Not Applicable;Normal Saline 08/26/20 1300   Dressing Options Hydrofiber Silver;Absorbent Abdominal Pad;Unna Boot 09/02/20 1330   Dressing Changed New 08/26/20 1300   Dressing Status Clean;Dry;Intact 08/26/20 1300   Dressing Change/Treatment Frequency Monday, Wednesday, Friday, and As Needed 08/26/20 1300   Non-staged Wound Description Full thickness 09/02/20 1330   Wound Length (cm)  8 cm 09/02/20 1330   Wound Width (cm) 7 cm 09/02/20 1330   Wound Depth (cm) 0.4 cm 09/02/20 1330   Wound Surface Area (cm^2) 56 cm^2 09/02/20 1330   Wound Volume (cm^3) 22.4 cm^3 09/02/20 1330   Post-Procedure Length (cm) 8 cm 09/02/20 1330   Post-Procedure Width (cm) 7.2 cm 09/02/20 1330   Post-Procedure Depth (cm) 0.4 cm 09/02/20 1330   Post-Procedure Surface Area (cm^2) 57.6 cm^2 09/02/20 1330   Post-Procedure Volume (cm^3) 23.04 cm^3 09/02/20 1330   Wound Healing % -196 09/02/20 1330   Wound Bed Granulation (%) 70 % 08/26/20 1300   Wound Bed Epithelium (%) 0 % 07/29/20 1450   Wound Bed Slough (%) 30 % 08/26/20 1300   Wound Bed Eschar (%) 0 % 07/29/20 1450   Wound Bed Granulation (%) - Post-Procedure 100 % 08/26/20 1300   Wound Bed Slough % - (Post-Procedure) 20 % 06/12/20 0945   Tunneling (cm) 0 cm 09/02/20 1330   Undermining (cm) 0 cm 09/02/20 1330   Wound Odor None 09/02/20 1330   Pulses 2+;Left;DP 06/05/20 0930   Exposed Structures None 09/02/20 1330                PROCEDURE: Excisional debridement of left lower extremity wounds   -2% viscous lidocaine applied topically to wound beds for approximately 10 minutes prior to debridement  -Curette used to excise thick layer of slough from wound beds.  The largest wound, to the lateral lower leg is clean today, no need for debridement.  Excisional debridement was performed to remove devitalized tissue from all other wounds until healthy, bleeding tissue was visualized.  Total area debrided 153.65 cm² tissue debrided into the subcutaneous layer.    -Bleeding controlled with manual pressure.    -Wounds cleansed with normal saline  -Wound culture collected, wound care completed by  RN, refer to flowsheet  -Patient tolerated the procedure well, without c/o pain or discomfort.         Pertinent Labs and Diagnostics:    Labs:     A1c:   Lab Results   Component Value Date/Time    HBA1C 5.7 (H) 01/23/2020 11:22 AM          VASCULAR STUDIES: BHUPINDER 12/27/2019   Right.     Doppler waveform of the common femoral artery is of high amplitude and    triphasic.    Doppler waveforms at the ankle are brisk and triphasic.    Ankle-brachial index is normal.       Left.    Could not perform ankle-brachial index due to large blisters/ulcers.   Normal toe-brachial index: 0.94   Doppler waveform of the common femoral artery is of high amplitude and    triphasic.    Biphasic waveforms seen at the ankle.     WOUND CULTURE:    DATE: 6/19/2020-culture collected in clinic  Culture Result  Abnormal     Beta Hemolytic Streptococcus group A   Heavy growth     Culture Result  Abnormal     Staphylococcus aureus   Moderate growth     Culture Result  Abnormal     Serratia marcescens   Moderate growth     Culture Result  Abnormal     Proteus mirabilis   Moderate growth     Culture Result  Abnormal     Pseudomonas species   Light growth   unable to isolate for sensitivity            ASSESSMENT AND PLAN:     1. Skin ulcer of left lower leg with fat layer exposed (HCC)  Comment: Patient has history of recurring ulcerations to left lower extremity.  Has undergone ablation procedures by Dr. White.  Discharged from [facility] in March 2020 due to healing, returned approximately 3 weeks later much worse.  Hospitalized from 4/16 until 5/21/2020 underwent multiple surgical debridements.    9/2/2020: Wound area continues to decrease, though slowly.  Periwound dermatitis improving.  Firm raised area noted near center of wound- unclear what this is.  Patient is undergo MRI to assess for OM, this may help to assess this area as well.  -Excisional debridement of wounds in clinic today, medically necessary to promote wound healing.  -Patient to return to clinic weekly for assessment and debridement  -Home health to change dressing 1-2 times per week in between clinic visits    Wound care: Silver Hydrofiber to manage exudate and bioburden, absorbent abdominal pad, Unna boot     2. Skin ulcer of multiple sites of right lower  extremity with fat layer exposed (HCC)  Comments: First observed in clinic on 6/5/2020, on initial evaluation. Patient is not certain when or how they occurred.  There is no documentation of wounds to this leg during recent hospitalization, so presumably they occurred after he went home.    9/2/2020: Wounds are resolved       Wound care: Open to air    3. Chronic venous stasis  Comments: Hemosiderin staining, brawny edema, scarring- findings consistent with CVI.  Pt established at [facility], underwent ablation procedures in 2019.     -Consider referral back to vascular if wounds failed to progress or deteriorate.    4. Acquired deformity of left ankle and foot  Comments: Charcot deformity of foot and ankle.  Very little ROM of ankle.  Patient was referred to orthopedics previously, however did not make appointment.    -Patient will need to follow-up with orthopedics once wounds are healed.    5.  Wound infection     9/2/2020: Patient was seen by ID yesterday, no new antibiotics  -MRI ordered by ID.  Patient to schedule        PATIENT EDUCATION  - Pt advised to go to ER for any increased redness, swelling, drainage or odor, or if he develops fever, chills, nausea or vomiting.        Please note that this note may have been created using voice recognition software. I have worked with technical experts from [facility] to optimize the interface.  I have made every reasonable attempt to correct obvious errors, but there may be errors of grammar and possibly content that I did not discover before finalizing the note.    N

## 2020-09-02 NOTE — LETTER
September 2, 2020    To: [facility] (fax #[phone])    Re: Goran Chavez (Date of Birth 1955)    Wound Care Order    · Remove old dressings.  · Cleanse left leg wounds with normal saline and gauze. Pat dry.  · Apply zinc barrier cream to sandrine-wounds.  · Apply Hydrofiber Silver to all wounds.  · Cover Hydrofiber Silver with ABD pads.  · Apply Unna Boot to left lower leg. Ensure that top of Unna Boot is no more than two inches below the bend of the knee.    · Home Health to see patient twice per week for dressing changes.            ________________________________  Nancy ANGULO

## 2020-09-03 ENCOUNTER — HOSPITAL ENCOUNTER (OUTPATIENT)
Dept: RADIOLOGY | Facility: MEDICAL CENTER | Age: 65
End: 2020-09-03
Attending: INTERNAL MEDICINE
Payer: MEDICARE

## 2020-09-03 ENCOUNTER — HOSPITAL ENCOUNTER (OUTPATIENT)
Dept: LAB | Facility: MEDICAL CENTER | Age: 65
End: 2020-09-03
Attending: INTERNAL MEDICINE
Payer: MEDICARE

## 2020-09-03 ENCOUNTER — PHARMACY VISIT (OUTPATIENT)
Dept: PHARMACY | Facility: MEDICAL CENTER | Age: 65
End: 2020-09-03
Payer: MEDICARE

## 2020-09-03 ENCOUNTER — OFFICE VISIT (OUTPATIENT)
Dept: MEDICAL GROUP | Facility: MEDICAL CENTER | Age: 65
End: 2020-09-03
Attending: INTERNAL MEDICINE
Payer: MEDICARE

## 2020-09-03 VITALS
TEMPERATURE: 97.7 F | WEIGHT: 168 LBS | DIASTOLIC BLOOD PRESSURE: 88 MMHG | BODY MASS INDEX: 22.75 KG/M2 | HEIGHT: 72 IN | HEART RATE: 110 BPM | OXYGEN SATURATION: 100 % | RESPIRATION RATE: 16 BRPM | SYSTOLIC BLOOD PRESSURE: 130 MMHG

## 2020-09-03 DIAGNOSIS — L97.929 VENOUS STASIS ULCER OF LEFT LOWER EXTREMITY (HCC): ICD-10-CM

## 2020-09-03 DIAGNOSIS — N18.30 CKD (CHRONIC KIDNEY DISEASE) STAGE 3, GFR 30-59 ML/MIN: ICD-10-CM

## 2020-09-03 DIAGNOSIS — I83.029 VENOUS STASIS ULCER OF LEFT LOWER EXTREMITY (HCC): ICD-10-CM

## 2020-09-03 DIAGNOSIS — Z01.812 PRE-PROCEDURE LAB EXAM: ICD-10-CM

## 2020-09-03 LAB
BUN SERPL-MCNC: 24 MG/DL (ref 8–22)
CREAT SERPL-MCNC: 1.03 MG/DL (ref 0.5–1.4)

## 2020-09-03 PROCEDURE — 99212 OFFICE O/P EST SF 10 MIN: CPT | Mod: 25 | Performed by: INTERNAL MEDICINE

## 2020-09-03 PROCEDURE — 36415 COLL VENOUS BLD VENIPUNCTURE: CPT

## 2020-09-03 PROCEDURE — 700117 HCHG RX CONTRAST REV CODE 255: Performed by: INTERNAL MEDICINE

## 2020-09-03 PROCEDURE — 99214 OFFICE O/P EST MOD 30 MIN: CPT | Performed by: INTERNAL MEDICINE

## 2020-09-03 PROCEDURE — A9576 INJ PROHANCE MULTIPACK: HCPCS | Performed by: INTERNAL MEDICINE

## 2020-09-03 PROCEDURE — 84520 ASSAY OF UREA NITROGEN: CPT

## 2020-09-03 PROCEDURE — RXMED WILLOW AMBULATORY MEDICATION CHARGE: Performed by: INTERNAL MEDICINE

## 2020-09-03 PROCEDURE — 82565 ASSAY OF CREATININE: CPT

## 2020-09-03 PROCEDURE — 73720 MRI LWR EXTREMITY W/O&W/DYE: CPT | Mod: LT

## 2020-09-03 RX ORDER — OXYCODONE HYDROCHLORIDE AND ACETAMINOPHEN 5; 325 MG/1; MG/1
1 TABLET ORAL
Qty: 15 TAB | Refills: 0 | Status: SHIPPED | OUTPATIENT
Start: 2020-09-03 | End: 2020-10-03

## 2020-09-03 RX ORDER — MELOXICAM 7.5 MG/1
7.5 TABLET ORAL
Qty: 30 TAB | Refills: 2 | Status: SHIPPED | OUTPATIENT
Start: 2020-09-03 | End: 2021-03-17

## 2020-09-03 RX ADMIN — GADOTERIDOL 15 ML: 279.3 INJECTION, SOLUTION INTRAVENOUS at 19:30

## 2020-09-03 ASSESSMENT — FIBROSIS 4 INDEX: FIB4 SCORE: 1.48

## 2020-09-03 ASSESSMENT — PAIN SCALES - GENERAL: PAINLEVEL: 5=MODERATE PAIN

## 2020-09-04 PROBLEM — Z86.19 HISTORY OF HEPATITIS C: Status: ACTIVE | Noted: 2018-08-07

## 2020-09-04 PROBLEM — K29.60 EROSIVE GASTRITIS: Status: RESOLVED | Noted: 2019-02-15 | Resolved: 2020-09-04

## 2020-09-05 NOTE — PROGRESS NOTES
Subjective:   Goran Chavez is a 65 y.o. male here today for follow-up lower extremity ulceration    Venous stasis ulcer of left lower extremity (HCC)  He has had recurrent cellulitis and a large ulceration this summer.  He has been under the care of our wound care clinic with numerous debridements, surgical intervention, and wound vacs.  He has Unna boots on both legs today.  I reviewed the imaging and notes from wound care.  His healing is progressing and he has had some venous ablation surgeries which have helped improve his healing.  He still complains of chronic pain especially in the left lower extremity.  He was seeing 1 of my colleagues in my absence for maternity leave and was receiving oxycodone however he has not had a prescription for this since July.  He is requesting refill today however has had significant interval healing since then.  He is high risk for controlled substance use given history of alcoholism and methamphetamine abuse.       Current medicines (including changes today)  Current Outpatient Medications   Medication Sig Dispense Refill   • meloxicam (MOBIC) 7.5 MG Tab Take 1 Tab by mouth 1 time daily as needed for Moderate Pain. 30 Tab 2   • oxyCODONE-acetaminophen (PERCOCET) 5-325 MG Tab Take 1 Tab by mouth 1 time daily as needed for up to 30 days. 15 Tab 0   • amitriptyline (ELAVIL) 25 MG Tab TAKE 1 TABLET BY MOUTH AT BEDTIME AS NEEDED FOR SLEEP (INCREASED DOSE) 30 Tab 5   • hydrOXYzine pamoate (VISTARIL) 50 MG Cap TAKE 1 TO 2 CAPSULES BY MOUTH EVERY 8 HOURS AS NEEDED FOR NAUSEA     • sertraline (ZOLOFT) 100 MG Tab Take 1 Tab by mouth every day. 30 Tab 3   • ferrous gluconate (FERGON) 324 (38 Fe) MG Tab Take 1 Tab by mouth every morning with breakfast. 30 Tab 5   • ascorbic acid (VITAMIN C) 500 MG tablet Take 1 Tab by mouth every day. 30 Tab 5   • amLODIPine (NORVASC) 10 MG Tab Take 1 Tab by mouth every day. 30 Tab 5   • atorvastatin (LIPITOR) 40 MG Tab Take 1 Tab by mouth every  day. 30 Tab 11     No current facility-administered medications for this visit.      He  has a past medical history of Anxiety, Charcot's joint of left foot, non-diabetic (3/21/2016), Hepatitis C, chronic (HCC), Hypertension, Kidney disease, Migraine, Polysubstance abuse (HCC) (3/8/2018), Tobacco use (4/18/2016), Ulcer of left lower extremity with necrosis of muscle (HCC) (3/21/2016), and Venous stasis ulcer (HCC) (2017).    ROS   Denies chest pain, shortness of breath  As above in HPI     Objective:     Vitals:    09/03/20 1639   BP: 130/88   Pulse: (!) 110   Resp: 16   Temp: 36.5 °C (97.7 °F)   SpO2: 100%     Body mass index is 22.78 kg/m².   Physical Exam:  Constitutional: Alert, no distress.  Skin: Warm, dry, good turgor, no rashes in visible areas.  Eye: Equal, round and reactive, conjunctiva clear, lids normal.  Psych: Alert and oriented x3, normal affect and mood.        Assessment and Plan:   The following treatment plan was discussed    1. Venous stasis ulcer of left lower extremity (HCC)  With some chronic pain related to the ulcerations however interval improvement and healing.  At this point, I have given him a small supply of Percocet to last for a month which he can take in the event of severe pain.  We discussed that after this prescription we will not be renewing his Percocet as we do expect his leg to continue to heal.  I have given him meloxicam to take as needed to help control his pain at baseline.  - meloxicam (MOBIC) 7.5 MG Tab; Take 1 Tab by mouth 1 time daily as needed for Moderate Pain.  Dispense: 30 Tab; Refill: 2  - oxyCODONE-acetaminophen (PERCOCET) 5-325 MG Tab; Take 1 Tab by mouth 1 time daily as needed for up to 30 days.  Dispense: 15 Tab; Refill: 0, no additional refills in the future in absence of new ulceration or surgical event        Followup: Return if symptoms worsen or fail to improve.

## 2020-09-05 NOTE — ASSESSMENT & PLAN NOTE
He has had recurrent cellulitis and a large ulceration this summer.  He has been under the care of our wound care clinic with numerous debridements, surgical intervention, and wound vacs.  He has Unna boots on both legs today.  I reviewed the imaging and notes from wound care.  His healing is progressing and he has had some venous ablation surgeries which have helped improve his healing.  He still complains of chronic pain especially in the left lower extremity.  He was seeing 1 of my colleagues in my absence for maternity leave and was receiving oxycodone however he has not had a prescription for this since July.  He is requesting refill today however has had significant interval healing since then.  He is high risk for controlled substance use given history of alcoholism and methamphetamine abuse.

## 2020-09-08 ENCOUNTER — TELEPHONE (OUTPATIENT)
Dept: INFECTIOUS DISEASES | Facility: MEDICAL CENTER | Age: 65
End: 2020-09-08

## 2020-09-08 DIAGNOSIS — F41.9 ANXIETY: ICD-10-CM

## 2020-09-08 NOTE — TELEPHONE ENCOUNTER
Spoke with daughter and relayed Dr Naylor message regarding MRI results     Patient call  Received: Yesterday  Message Contents   Ronak Naylor M.D.  Christal Nuñez, Med Ass't             Reilly Siegel,     Please let this patient know that his MRI was negative for a bone infection.  The rest of the findings are from the swelling around his open wound.    Previous Messages       Daughter had no further questions

## 2020-09-09 ENCOUNTER — OFFICE VISIT (OUTPATIENT)
Dept: WOUND CARE | Facility: MEDICAL CENTER | Age: 65
End: 2020-09-09
Attending: NURSE PRACTITIONER
Payer: MEDICARE

## 2020-09-09 VITALS
RESPIRATION RATE: 16 BRPM | DIASTOLIC BLOOD PRESSURE: 100 MMHG | TEMPERATURE: 98 F | HEART RATE: 65 BPM | SYSTOLIC BLOOD PRESSURE: 159 MMHG | OXYGEN SATURATION: 99 %

## 2020-09-09 DIAGNOSIS — L97.912 SKIN ULCER OF MULTIPLE SITES OF RIGHT LOWER EXTREMITY WITH FAT LAYER EXPOSED (HCC): ICD-10-CM

## 2020-09-09 DIAGNOSIS — M21.962 ACQUIRED DEFORMITY OF LEFT ANKLE AND FOOT: ICD-10-CM

## 2020-09-09 DIAGNOSIS — L08.9 WOUND INFECTION: ICD-10-CM

## 2020-09-09 DIAGNOSIS — L97.922 SKIN ULCER OF LEFT LOWER LEG WITH FAT LAYER EXPOSED (HCC): ICD-10-CM

## 2020-09-09 DIAGNOSIS — I87.8 CHRONIC VENOUS STASIS: ICD-10-CM

## 2020-09-09 DIAGNOSIS — T14.8XXA WOUND INFECTION: ICD-10-CM

## 2020-09-09 PROCEDURE — 11042 DBRDMT SUBQ TIS 1ST 20SQCM/<: CPT

## 2020-09-09 PROCEDURE — 11042 DBRDMT SUBQ TIS 1ST 20SQCM/<: CPT | Performed by: NURSE PRACTITIONER

## 2020-09-09 PROCEDURE — 11045 DBRDMT SUBQ TISS EACH ADDL: CPT | Performed by: NURSE PRACTITIONER

## 2020-09-09 PROCEDURE — 11045 DBRDMT SUBQ TISS EACH ADDL: CPT

## 2020-09-09 RX ORDER — SERTRALINE HYDROCHLORIDE 100 MG/1
100 TABLET, FILM COATED ORAL DAILY
Qty: 30 TAB | Refills: 5 | Status: SHIPPED | OUTPATIENT
Start: 2020-09-09 | End: 2021-05-18

## 2020-09-09 ASSESSMENT — ENCOUNTER SYMPTOMS
DIARRHEA: 0
NAUSEA: 0
VOMITING: 0
DEPRESSION: 1
NERVOUS/ANXIOUS: 0
COUGH: 0
FEVER: 0
CONSTIPATION: 0
CHILLS: 0
CLAUDICATION: 0
SHORTNESS OF BREATH: 0
MYALGIAS: 0

## 2020-09-09 NOTE — LETTER
September 9, 2020      To: [facility] (fax #[phone])  Patient Name: Goran Chavez   Date of Birth 1955    Wound Care Order    · Remove old dressings.  · Cleanse left leg wounds with normal saline and gauze. Pat dry.  · Apply zinc barrier cream to sandrine-wounds.  · Apply Hydrofiber Silver to all wounds.  · Apply Unna Boot to left lower leg. Apply ABD pads between viscopaste and coban layers of unna boot to absorb drainage. Ensure that top of Unna Boot is no more than two inches below the bend of the knee.  · Apply Tubi D to RLE    · Home Health to see patient twice per week for dressing changes.            ________________________________  Nancy ANGULO

## 2020-09-09 NOTE — PROGRESS NOTES
Provider Encounter- Full Thickness wound    HISTORY OF PRESENT ILLNESS  Wound History:    START OF CARE IN CLINIC: 5/20/2020               REFERRING PROVIDER: KEV Keith                 WOUND ETIOLOGY: venous,  likely mixed etiology              LOCATION: Left lower leg, multiple wounds                                   Right lower leg, multiple wounds-first observed in clinic on initial evaluation, 6/5/2020.  Resolved 9/2/2020                HISTORY: Patient is very well-known to this clinic from previous treatment of wounds to his left lower extremity.  He was discharged from the clinic in March 2020 due to full resolution of his wound.  He returned to the clinic approximately 3 weeks later, on 4/16, with cellulitis, edema, and large open wounds to his left lower leg, and was sent directly to [facility] emergency room.  He was admitted for IV antibiotics and surgical intervention.  On 4/22/2020, he underwent an I&D of his left lower extremity, fasciotomy, and VAC placement.  In the following weeks he underwent surgery 3 more times for irrigation and debridement.  He was discharged home on 5/21, on the VAC, with home health and a referral to [facility].       Pertinent Medical History:  Anxiety, Charcot's joint of left foot, non-diabetic (3/21/2016), Chronic congestive heart failure (HCC) (11/16/2017), Hepatitis C, chronic (HCC), Hypertension, Migraine, Polysubstance abuse (HCC) (3/8/2018), Tobacco use (4/18/2016), Ulcer of left lower extremity with necrosis of muscle (HCC) (3/21/2016), and Venous stasis ulcer ([facility]) (2017).                ETIOLOGY HISTORY:  Vascular Surgeon: Dr. White. Compression Circ-aid. Varicose Veins none visible     TOBACCO USE: Patient denies; patient also denies alcohol and recreational drug use    Patient's problem list, allergies, and current medications reviewed and updated in Epic    Interval History:  6/5/2020: Initial clinic visit with KEV Brantley.  Patient  "returns to clinic after a long hospitalization.  The VAC was held by [facility] due to severe maceration and deterioration of wound beds.  He also presents today with new wounds to his right lower extremity.  He states he does not know how or when these occurred, states, \"they just popped up\".  He is uncertain whether or not he has follow-up appointments with his surgeon.  He does not know what medications he is on.      6/12/2020 : Clinic visit with KEV Brantley.  Goran states he is feeling well today,denies fevers, chills, nausea, vomiting, cough or shortness of breath.  Condition of his periwound skin is significantly improved.  I would like to restart VAC, however he did not bring the VAC with him, and that has already been on for 2-1/2 weeks.  Will likely need to be reordered.  [facility] continues to see him several times per week for dressing changes.  He is tolerating compression without any difficulty.    6/19/2020 : Clinic visit with KEV Brantley.   Goran states he is feeling well today,denies fevers, chills, nausea, vomiting, cough or shortness of breath.  He brought his VAC with him today.  However,  too much slough on wound beds to consider re-applying.  [facility] continues to see him 2 times per week.  I have asked him to bring the VAC with him again next week, and will hopefully place it at that time.  Per patient, dressings are saturated between dressing changes.    6/26/2020 : Clinic visit with KEV Brantley.   Goran states he is feeling well today,denies fevers, chills, nausea, vomiting, cough or shortness of breath.  He brought his VAC with him again today, however we are not able to put it on as he is still having twice drainage.  Sensitivities from culture still pending.  However I did start him on Augmentin today which we are able to obtain from [facility] prior to his leaving the clinic today and had him take one before he left.  [facility] is been seen 2 times per week " in between clinic visits.  We will try and get them to see him over the weekend to do an additional dressing change.  Will adjust antibiotics once sensitivities are known.  Hopefully start VAC next clinic visit.    7/2/2020 : Clinic visit with KEV Brantley.    Goran states he is feeling well today,denies fevers, chills, nausea, vomiting, cough or shortness of breath.  His VAC is here in the clinic, however he does not have another home health appointment until Monday, 4 days from now.  He has been taking Augmentin as prescribed last week.  I informed him that I added Bactrim DS after I got sensitivity results, and that it should be waiting for him at his pharmacy.    7/9/2020: Clinic visit with KEV Luna. Patient states that they are feeling well today.  Patient denies fever, chills, nausea, vomiting, lightheadedness, dizziness, shortness of breath and chest pain.  All wound beds debrided in clinic today patient's wounds continue to progress beefy red granulation tissue to left lower extremity lateral wound tendon is being covered with granulation tissue continue with wound VAC and current plan of care.    7/15/2020 : Clinic visit with KEV Brantley.  Goran states he is feeling well today,denies fevers, chills, nausea, vomiting, cough or shortness of breath.  Home health continues to see him in between clinic visits.  He states he has not been smoking, drinking or using any street drugs.    7/22/2020 : Clinic visit with KEV Brantley.   Goran states he is feeling well today,denies fevers, chills, nausea, vomiting, cough or shortness of breath.  Home health continues to see him in between clinic visits.    7/29/2020: Clinic visit with KEV Luna. Patient states that they are feeling well today.  Patient denies fever, chills, nausea, vomiting, lightheadedness, dizziness, shortness of breath and chest pain.    8/5/2020: Clinic visit with KEV Luna. Patient  states that they are feeling well today.  Patient denies fever, chills, nausea, vomiting, lightheadedness, dizziness, shortness of breath and chest pain.  Patient's wounds are foul-smelling today.  Patient had positive culture we have already ordered the patient Cipro and Augmentin patient reports that he got my message from last night.  Patient reports that he is can  antibiotics tonight in the ED initiate them.  Patient has been referred to infectious disease due to multiple bacterial growth and for their expert opinion.    8/20/2020 : Clinic visit with KEV Brantley.  Goran states he is feeling well, denies fevers, chills, nausea, vomiting, cough or shortness of breath.  He states he has not yet heard from ID.  However, review of referral note indicates that a message was left on his phone earlier today.  Kodi states he will check his message and will call them back.  He completed his antibiotics 2 days ago.    9/2/2020 : Clinic visit with KEV Brantley.   Goran states he is feeling well, denies fevers, chills, nausea, vomiting, cough or shortness of breath.  He was seen by ID yesterday, no new antibiotics ordered, however MRI ordered to assess for OM.  Goran has an appoint with his PCP tomorrow.    9/9/2020 : Clinic visit with KEV Brantley.  Goran states he is feeling well, denies fevers, chills, nausea, vomiting, cough or shortness of breath.  Reviewed MRI results with him in clinic today, no evidence of OM.      REVIEW OF SYSTEMS:   Review of Systems   Constitutional: Negative for chills and fever.        States he is eating well, appetite normal   Respiratory: Negative for cough and shortness of breath.    Cardiovascular: Positive for leg swelling. Negative for chest pain and claudication.        Chronic swelling in legs, for several years   Gastrointestinal: Negative for constipation, diarrhea, nausea and vomiting.   Genitourinary: Negative for dysuria.   Musculoskeletal:  Positive for joint pain. Negative for myalgias.        Chronic   Psychiatric/Behavioral: Positive for depression. The patient is not nervous/anxious.        PHYSICAL EXAMINATION:   /100   Pulse 65   Temp 36.7 °C (98 °F) (Temporal)   Resp 16   SpO2 99%     Physical Exam   Constitutional: He is oriented to person, place, and time and well-developed, well-nourished, and in no distress.   Disheveled unkept   HENT:   Head: Normocephalic and atraumatic.   Eyes: Pupils are equal, round, and reactive to light. Conjunctivae and EOM are normal.   Neck: Neck supple.   Cardiovascular: Intact distal pulses.   Pulmonary/Chest: Effort normal.   Musculoskeletal: Normal range of motion.         General: Deformity and edema present.      Comments: Left charcot foot and ankle  1+pitting edema to bilateral LE   Neurological: He is alert and oriented to person, place, and time.   Skin: Skin is warm. There is erythema.   Large full-thickness wounds to bilateral lower extremities  Further wound flowsheet and photos   Psychiatric: Affect normal.   Patient lacks insight into his own healthcare issues, defers to his daughter to keep track of his medications and appointments       WOUND ASSESSMENT          Wound 06/05/20 Full Thickness Wound Leg Left LLE anterolateral (Active)   Wound Image    09/09/20 1401   Site Assessment Pink;Yellow 09/09/20 1401   Periwound Assessment Denuded;Edema 09/09/20 1401   Margins Attached edges 09/09/20 1401   Closure Secondary intention 08/26/20 1300   Drainage Amount Moderate 09/09/20 1401   Drainage Description Serosanguineous 09/09/20 1401   Treatments Cleansed;Topical Lidocaine;Provider debridement 09/09/20 1401   Wound Cleansing Foam Cleanser/Washcloth 09/09/20 1401   Periwound Protectant Barrier Paste 09/09/20 1401   Dressing Cleansing/Solutions Not Applicable 08/26/20 1300   Dressing Options Hydrofiber Silver;Viscopaste;Absorbent Abdominal Pad;Coban 09/09/20 1401   Dressing Options (Historical)   Wound Vac;Compression Wrap Two Layer 07/15/20 1430   Dressing Changed New 08/26/20 1300   Dressing Status Clean;Dry;Intact 08/26/20 1300   Dressing Change/Treatment Frequency Monday, Wednesday, Friday, and As Needed 07/15/20 1430   Non-staged Wound Description Full thickness 09/09/20 1401   Wound Length (cm) 13 cm 09/09/20 1401   Wound Width (cm) 5.1 cm 09/09/20 1401   Wound Depth (cm) 0.1 cm 09/09/20 1401   Wound Surface Area (cm^2) 66.3 cm^2 09/09/20 1401   Wound Volume (cm^3) 6.63 cm^3 09/09/20 1401   Post-Procedure Length (cm) 13.1 cm 09/09/20 1401   Post-Procedure Width (cm) 5.2 cm 09/09/20 1401   Post-Procedure Depth (cm) 0.1 cm 09/09/20 1401   Post-Procedure Surface Area (cm^2) 68.12 cm^2 09/09/20 1401   Post-Procedure Volume (cm^3) 6.81 cm^3 09/09/20 1401   Wound Healing % 88 09/09/20 1401   Wound Bed Granulation (%) 100 % 08/26/20 1300   Wound Bed Epithelium (%) 0 % 07/29/20 1450   Wound Bed Slough (%) 25 % 09/09/20 1401   Wound Bed Eschar (%) 0 % 07/29/20 1450   Wound Bed Granulation (%) - Post-Procedure 100 % 06/12/20 0945   Wound Bed Slough % - (Post-Procedure) 5 % 06/05/20 0930   Tunneling (cm) 0 cm 09/09/20 1401   Undermining (cm) 0 cm 09/09/20 1401   Wound Odor None 09/09/20 1401   Pulses Left;2+;DP 06/05/20 0930   Exposed Structures None 09/09/20 1401       Wound 06/05/20 Full Thickness Wound Ankle LLE distal posteriomedial (Active)   Wound Image   09/09/20 1401   Site Assessment Pink;Yellow 09/09/20 1401   Periwound Assessment Denuded;Edema 09/09/20 1401   Margins Attached edges 09/09/20 1401   Closure Secondary intention 08/26/20 1300   Drainage Amount Moderate 09/09/20 1401   Drainage Description Serosanguineous 09/09/20 1401   Treatments Cleansed;Topical Lidocaine;Provider debridement 09/09/20 1401   Wound Cleansing Foam Cleanser/Washcloth 09/09/20 1401   Periwound Protectant Barrier Paste 09/09/20 1401   Dressing Cleansing/Solutions Not Applicable;Normal Saline 08/26/20 1300   Dressing Options  Hydrofiber Silver;Viscopaste;Absorbent Abdominal Pad;Coban 09/09/20 1401   Dressing Options (Historical)  Hydrofera Blue Ready;Absorbent Abdominal Pad;Compression Wrap Two Layer 07/15/20 1430   Dressing Changed New 08/26/20 1300   Dressing Status Clean;Dry;Intact 08/26/20 1300   Dressing Change/Treatment Frequency Monday, Wednesday, Friday, and As Needed 07/15/20 1430   Non-staged Wound Description Full thickness 09/09/20 1401   Wound Length (cm) 4.4 cm 09/09/20 1401   Wound Width (cm) 1.6 cm 09/09/20 1401   Wound Depth (cm) 0.1 cm 09/09/20 1401   Wound Surface Area (cm^2) 7.04 cm^2 09/09/20 1401   Wound Volume (cm^3) 0.7 cm^3 09/09/20 1401   Post-Procedure Length (cm) 4.8 cm 09/02/20 1330   Post-Procedure Width (cm) 4 cm 09/02/20 1330   Post-Procedure Depth (cm) 0.2 cm 09/02/20 1330   Post-Procedure Surface Area (cm^2) 19.2 cm^2 09/02/20 1330   Post-Procedure Volume (cm^3) 3.84 cm^3 09/02/20 1330   Wound Healing % 88 09/09/20 1401   Wound Bed Granulation (%) 70 % 08/26/20 1300   Wound Bed Epithelium (%) 0 % 07/29/20 1450   Wound Bed Slough (%) 50 % 09/09/20 1401   Wound Bed Eschar (%) 0 % 07/29/20 1450   Wound Bed Granulation (%) - Post-Procedure 100 % 08/26/20 1300   Wound Bed Slough % - (Post-Procedure) 20 % 06/12/20 0945   Tunneling (cm) 0 cm 09/09/20 1401   Undermining (cm) 0 cm 09/09/20 1401   Wound Odor None 09/09/20 1401   Pulses Left;2+;DP 06/05/20 0930   Exposed Structures None 09/09/20 1401       Wound 06/05/20 Full Thickness Wound Leg Left LLE posteromedial (Active)   Wound Image    09/09/20 1401   Site Assessment Pink;Yellow 09/09/20 1401   Periwound Assessment Denuded;Edema 09/09/20 1401   Margins Attached edges 09/09/20 1401   Closure Secondary intention 08/26/20 1300   Drainage Amount Large 09/09/20 1401   Drainage Description Serosanguineous 09/09/20 1401   Treatments Cleansed;Topical Lidocaine;Provider debridement 09/09/20 1401   Wound Cleansing Foam Cleanser/Washcloth 09/09/20 1401   Periwound  Protectant Barrier Paste 09/09/20 1401   Dressing Cleansing/Solutions Not Applicable;Normal Saline 08/26/20 1300   Dressing Options Hydrofiber Silver;Viscopaste;Absorbent Abdominal Pad;Coban 09/09/20 1401   Dressing Options (Historical)  Hydrofera Blue Ready;Absorbent Abdominal Pad;Compression Wrap Two Layer 07/15/20 1430   Dressing Changed New 08/26/20 1300   Dressing Status Clean;Dry;Intact 08/26/20 1300   Dressing Change/Treatment Frequency Monday, Wednesday, Friday, and As Needed 08/26/20 1300   Non-staged Wound Description Full thickness 09/09/20 1401   Wound Length (cm) 7 cm 09/09/20 1401   Wound Width (cm) 8.5 cm 09/09/20 1401   Wound Depth (cm) 0.1 cm 09/09/20 1401   Wound Surface Area (cm^2) 59.5 cm^2 09/09/20 1401   Wound Volume (cm^3) 5.95 cm^3 09/09/20 1401   Post-Procedure Length (cm) 7.3 cm 09/09/20 1401   Post-Procedure Width (cm) 8.6 cm 09/09/20 1401   Post-Procedure Depth (cm) 0.2 cm 09/09/20 1401   Post-Procedure Surface Area (cm^2) 62.78 cm^2 09/09/20 1401   Post-Procedure Volume (cm^3) 12.56 cm^3 09/09/20 1401   Wound Healing % 21 09/09/20 1401   Wound Bed Granulation (%) 70 % 08/26/20 1300   Wound Bed Epithelium (%) 0 % 07/29/20 1450   Wound Bed Slough (%) 50 % 09/09/20 1401   Wound Bed Eschar (%) 0 % 07/29/20 1450   Wound Bed Granulation (%) - Post-Procedure 100 % 08/26/20 1300   Wound Bed Slough % - (Post-Procedure) 20 % 06/12/20 0945   Tunneling (cm) 0 cm 09/09/20 1401   Undermining (cm) 0 cm 09/09/20 1401   Wound Odor None 09/09/20 1401   Pulses 2+;Left;DP 06/05/20 0930   Exposed Structures None 09/09/20 1401                           PROCEDURE: Excisional debridement of left lower extremity wounds   -2% viscous lidocaine applied topically to wound beds for approximately 10 minutes prior to debridement  -Curette used to excise thick layer of slough from wound beds.  The largest wound, to the lateral lower leg is clean today, no need for debridement.  Excisional debridement was performed to  remove devitalized tissue from all other wounds until healthy, bleeding tissue was visualized.  Total area debrided 150.1 cm² tissue debrided into the subcutaneous layer.    -Bleeding controlled with manual pressure.    -Wounds cleansed with normal saline  -Wound culture collected, wound care completed by  RN, refer to flowsheet  -Patient tolerated the procedure well, without c/o pain or discomfort.         Pertinent Labs and Diagnostics:    Labs:     A1c:   Lab Results   Component Value Date/Time    HBA1C 5.7 (H) 2020 11:22 AM      Imagin/3/2020-MRI left tibia-fibula with and without contrast  IMPRESSION:     Moderate lateral leg cellulitis and underlying myositis     No abscess or osteomyelitis    VASCULAR STUDIES: BHUPINDER 2019   Right.    Doppler waveform of the common femoral artery is of high amplitude and    triphasic.    Doppler waveforms at the ankle are brisk and triphasic.    Ankle-brachial index is normal.       Left.    Could not perform ankle-brachial index due to large blisters/ulcers.   Normal toe-brachial index: 0.94   Doppler waveform of the common femoral artery is of high amplitude and    triphasic.    Biphasic waveforms seen at the ankle.     WOUND CULTURE:    DATE: 2020-culture collected in clinic  Culture Result  Abnormal     Beta Hemolytic Streptococcus group A   Heavy growth     Culture Result  Abnormal     Staphylococcus aureus   Moderate growth     Culture Result  Abnormal     Serratia marcescens   Moderate growth     Culture Result  Abnormal     Proteus mirabilis   Moderate growth     Culture Result  Abnormal     Pseudomonas species   Light growth   unable to isolate for sensitivity            ASSESSMENT AND PLAN:     1. Skin ulcer of left lower leg with fat layer exposed (HCC)  Comment: Patient has history of recurring ulcerations to left lower extremity.  Has undergone ablation procedures by Dr. White.  Discharged from [facility] in 2020 due to healing, returned  approximately 3 weeks later much worse.  Hospitalized from 4/16 until 5/21/2020 underwent multiple surgical debridements.    9/9/2020: Wound area continues to decrease, though slowly.  Periwound slightly denuded.    -Excisional debridement of wounds in clinic today, medically necessary to promote wound healing.  -Patient to return to clinic weekly for assessment and debridement  -Home health to change dressing 1-2 times per week in between clinic visits    Wound care: Ostomy powder and barrier paste to periwound to treat denudation, Silver Hydrofiber to manage exudate and bioburden, absorbent abdominal pad, Unna's boot     2. Skin ulcer of multiple sites of right lower extremity with fat layer exposed (HCC)  Comments: First observed in clinic on 6/5/2020, on initial evaluation. Patient is not certain when or how they occurred.  There is no documentation of wounds to this leg during recent hospitalization, so presumably they occurred after he went home.    9/9/2020: Wounds are main resolved       Wound care: Open to air    3. Chronic venous stasis  Comments: Hemosiderin staining, brawny edema, scarring- findings consistent with CVI.  Pt established at [facility], underwent ablation procedures in 2019.     -Consider referral back to vascular if wounds failed to progress or deteriorate.    4. Acquired deformity of left ankle and foot  Comments: Charcot deformity of foot and ankle.  Very little ROM of ankle.  Patient was referred to orthopedics previously, however did not make appointment.    -Patient will need to follow-up with orthopedics once wounds are healed.    5.  Wound infection     9/20/2020: Patient was seen by ID on 9/1, no new antibiotics, MRI of tib-fib ordered  -MRI negative for OM        PATIENT EDUCATION  - Pt advised to go to ER for any increased redness, swelling, drainage or odor, or if he develops fever, chills, nausea or vomiting.        Please note that this note may have been created using voice  recognition software. I have worked with technical experts from [facility] to optimize the interface.  I have made every reasonable attempt to correct obvious errors, but there may be errors of grammar and possibly content that I did not discover before finalizing the note.    N

## 2020-09-16 ENCOUNTER — OFFICE VISIT (OUTPATIENT)
Dept: WOUND CARE | Facility: MEDICAL CENTER | Age: 65
End: 2020-09-16
Attending: NURSE PRACTITIONER
Payer: MEDICARE

## 2020-09-16 VITALS
SYSTOLIC BLOOD PRESSURE: 134 MMHG | DIASTOLIC BLOOD PRESSURE: 85 MMHG | OXYGEN SATURATION: 99 % | HEART RATE: 80 BPM | TEMPERATURE: 98 F | RESPIRATION RATE: 20 BRPM

## 2020-09-16 DIAGNOSIS — L97.912 SKIN ULCER OF MULTIPLE SITES OF RIGHT LOWER EXTREMITY WITH FAT LAYER EXPOSED (HCC): ICD-10-CM

## 2020-09-16 DIAGNOSIS — T14.8XXA WOUND INFECTION: ICD-10-CM

## 2020-09-16 DIAGNOSIS — L08.9 WOUND INFECTION: ICD-10-CM

## 2020-09-16 DIAGNOSIS — I87.8 CHRONIC VENOUS STASIS: ICD-10-CM

## 2020-09-16 DIAGNOSIS — M21.962 ACQUIRED DEFORMITY OF LEFT ANKLE AND FOOT: ICD-10-CM

## 2020-09-16 DIAGNOSIS — L97.922 SKIN ULCER OF LEFT LOWER LEG WITH FAT LAYER EXPOSED (HCC): ICD-10-CM

## 2020-09-16 PROCEDURE — 11045 DBRDMT SUBQ TISS EACH ADDL: CPT

## 2020-09-16 PROCEDURE — 99213 OFFICE O/P EST LOW 20 MIN: CPT | Mod: 25

## 2020-09-16 PROCEDURE — 11042 DBRDMT SUBQ TIS 1ST 20SQCM/<: CPT | Performed by: NURSE PRACTITIONER

## 2020-09-16 PROCEDURE — 99213 OFFICE O/P EST LOW 20 MIN: CPT | Mod: 25 | Performed by: NURSE PRACTITIONER

## 2020-09-16 PROCEDURE — 11045 DBRDMT SUBQ TISS EACH ADDL: CPT | Performed by: NURSE PRACTITIONER

## 2020-09-16 PROCEDURE — 11042 DBRDMT SUBQ TIS 1ST 20SQCM/<: CPT

## 2020-09-16 RX ORDER — CIPROFLOXACIN 500 MG/1
500 TABLET, FILM COATED ORAL 2 TIMES DAILY
Qty: 14 TAB | Refills: 0 | Status: SHIPPED | OUTPATIENT
Start: 2020-09-16 | End: 2020-09-23

## 2020-09-16 RX ORDER — AMOXICILLIN AND CLAVULANATE POTASSIUM 875; 125 MG/1; MG/1
1 TABLET, FILM COATED ORAL 2 TIMES DAILY
Qty: 20 TAB | Refills: 0 | Status: SHIPPED | OUTPATIENT
Start: 2020-09-16 | End: 2020-09-26

## 2020-09-16 ASSESSMENT — ENCOUNTER SYMPTOMS
NERVOUS/ANXIOUS: 0
DEPRESSION: 1
MYALGIAS: 0
FEVER: 0
CLAUDICATION: 0
COUGH: 0
CHILLS: 0
NAUSEA: 0
VOMITING: 0
DIARRHEA: 0
CONSTIPATION: 0
SHORTNESS OF BREATH: 0

## 2020-09-16 NOTE — PATIENT INSTRUCTIONS
-Keep dressings clean, dry and covered while bathing. Only change dressings if they become over saturated, soiled or fall off.     -Avoid prolonged standing or sitting without elevating your legs.    -Remove your compression garments if you have severe pain, severe swelling, numbness, color change, or temperature change in your toes. If you need to remove your compression garments, do so by unrolling them. Do not cut the compression garments off, this is to prevent cutting yourself on accident.    -Should you experience any significant changes in your wound(s), such as infection (redness, swelling, localized heat, increased pain, fever > 101 F, chills) or have any questions regarding your home care instructions, please contact the wound center at [phone]. If after hours, contact your primary care physician or go to the hospital emergency room.

## 2020-09-16 NOTE — PROGRESS NOTES
Provider Encounter- Full Thickness wound    HISTORY OF PRESENT ILLNESS  Wound History:    START OF CARE IN CLINIC: 5/20/2020               REFERRING PROVIDER: KEV Keith                 WOUND ETIOLOGY: venous,  likely mixed etiology              LOCATION: Left lower leg, multiple wounds                                   Right lower leg, multiple wounds-first observed in clinic on initial evaluation, 6/5/2020.  Resolved 9/2/2020                HISTORY: Patient is very well-known to this clinic from previous treatment of wounds to his left lower extremity.  He was discharged from the clinic in March 2020 due to full resolution of his wound.  He returned to the clinic approximately 3 weeks later, on 4/16, with cellulitis, edema, and large open wounds to his left lower leg, and was sent directly to [facility] emergency room.  He was admitted for IV antibiotics and surgical intervention.  On 4/22/2020, he underwent an I&D of his left lower extremity, fasciotomy, and VAC placement.  In the following weeks he underwent surgery 3 more times for irrigation and debridement.  He was discharged home on 5/21, on the VAC, with home health and a referral to [facility].       Pertinent Medical History:  Anxiety, Charcot's joint of left foot, non-diabetic (3/21/2016), Chronic congestive heart failure (HCC) (11/16/2017), Hepatitis C, chronic (HCC), Hypertension, Migraine, Polysubstance abuse (HCC) (3/8/2018), Tobacco use (4/18/2016), Ulcer of left lower extremity with necrosis of muscle (HCC) (3/21/2016), and Venous stasis ulcer ([facility]) (2017).                ETIOLOGY HISTORY:  Vascular Surgeon: Dr. White. Compression Circ-aid. Varicose Veins none visible     TOBACCO USE: Patient denies; patient also denies alcohol and recreational drug use    Patient's problem list, allergies, and current medications reviewed and updated in Epic    Interval History:  6/5/2020: Initial clinic visit with KEV Brantley.  Patient  "returns to clinic after a long hospitalization.  [facility] due to severe maceration and deterioration of wound beds.  He also presents today with new wounds to his right lower extremity.  He states he does not know how or when these occurred, states, \"they just popped up\".  He is uncertain whether or not he has follow-up appointments with his surgeon.  He does not know what medications he is on.      6/12/2020 : Clinic visit with KEV Brantley.  Goran states he is feeling well today,denies fevers, chills, nausea, vomiting, cough or shortness of breath.  Condition of his periwound skin is significantly improved.  I would like to restart VAC, however he did not bring the VAC with him, and that has already been on for 2-1/2 weeks.  Will likely need to be reordered.  [facility] continues to see him several times per week for dressing changes.  He is tolerating compression without any difficulty.    6/19/2020 : Clinic visit with KEV Brantley.   Goran states he is feeling well today,denies fevers, chills, nausea, vomiting, cough or shortness of breath.  He brought his VAC with him today.  However,  too much slough on wound beds to consider re-applying.  [facility] continues to see him 2 times per week.  I have asked him to bring the VAC with him again next week, and will hopefully place it at that time.  Per patient, dressings are saturated between dressing changes.    6/26/2020 : Clinic visit with KEV Brantley.   Goran states he is feeling well today,denies fevers, chills, nausea, vomiting, cough or shortness of breath.  He brought his VAC with him again today, however we are not able to put it on as he is still having twice drainage.  Sensitivities from culture still pending.  However I did start him on Augmentin today which we are able to obtain from [facility] prior to his leaving the clinic today and had him take one before he left.  [facility] is been seen 2 times per week " in between clinic visits.  We will try and get them to see him over the weekend to do an additional dressing change.  Will adjust antibiotics once sensitivities are known.  Hopefully start VAC next clinic visit.    7/2/2020 : Clinic visit with KEV Brantley.    Goran states he is feeling well today,denies fevers, chills, nausea, vomiting, cough or shortness of breath.  His VAC is here in the clinic, however he does not have another home health appointment until Monday, 4 days from now.  He has been taking Augmentin as prescribed last week.  I informed him that I added Bactrim DS after I got sensitivity results, and that it should be waiting for him at his pharmacy.    7/9/2020: Clinic visit with KEV Luna. Patient states that they are feeling well today.  Patient denies fever, chills, nausea, vomiting, lightheadedness, dizziness, shortness of breath and chest pain.  All wound beds debrided in clinic today patient's wounds continue to progress beefy red granulation tissue to left lower extremity lateral wound tendon is being covered with granulation tissue continue with wound VAC and current plan of care.    7/15/2020 : Clinic visit with KEV Brantley.  Goran states he is feeling well today,denies fevers, chills, nausea, vomiting, cough or shortness of breath.  Home health continues to see him in between clinic visits.  He states he has not been smoking, drinking or using any street drugs.    7/22/2020 : Clinic visit with KEV Brantley.   Goran states he is feeling well today,denies fevers, chills, nausea, vomiting, cough or shortness of breath.  Home health continues to see him in between clinic visits.    7/29/2020: Clinic visit with KEV Luna. Patient states that they are feeling well today.  Patient denies fever, chills, nausea, vomiting, lightheadedness, dizziness, shortness of breath and chest pain.    8/5/2020: Clinic visit with KEV Luna. Patient  states that they are feeling well today.  Patient denies fever, chills, nausea, vomiting, lightheadedness, dizziness, shortness of breath and chest pain.  Patient's wounds are foul-smelling today.  Patient had positive culture we have already ordered the patient Cipro and Augmentin patient reports that he got my message from last night.  Patient reports that he is can  antibiotics tonight in the ED initiate them.  Patient has been referred to infectious disease due to multiple bacterial growth and for their expert opinion.    8/20/2020 : Clinic visit with KEV Brantley.  Goran states he is feeling well, denies fevers, chills, nausea, vomiting, cough or shortness of breath.  He states he has not yet heard from ID.  However, review of referral note indicates that a message was left on his phone earlier today.  Kodi states he will check his message and will call them back.  He completed his antibiotics 2 days ago.    9/2/2020 : Clinic visit with KEV Brantley.   Goran states he is feeling well, denies fevers, chills, nausea, vomiting, cough or shortness of breath.  He was seen by ID yesterday, no new antibiotics ordered, however MRI ordered to assess for OM.  Goran has an appoint with his PCP tomorrow.    9/9/2020 : Clinic visit with KEV Brantley.  Goran states he is feeling well, denies fevers, chills, nausea, vomiting, cough or shortness of breath.  Reviewed MRI results with him in clinic today, no evidence of OM.    9/16/2020 : Clinic visit with KEV Brantley.  Goran presents today looking more disheveled than usual, he wore sunglasses throughout the visit.  His speech is a bit pressured and disorganized.  He denies using any street drugs or drinking alcohol.  His dressings are saturated, and his wounds have deteriorated significantly.  He denies fevers, chills, nausea, vomiting, cough or shortness of breath.  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  Home health continues to see him in between clinic visits for dressing changes.      REVIEW OF SYSTEMS:   Review of Systems   Constitutional: Negative for chills and fever.        States he is eating well, appetite normal   Respiratory: Negative for cough and shortness of breath.    Cardiovascular: Positive for leg swelling. Negative for chest pain and claudication.        Chronic swelling in legs, for several years   Gastrointestinal: Negative for constipation, diarrhea, nausea and vomiting.   Genitourinary: Negative for dysuria.   Musculoskeletal: Positive for joint pain. Negative for myalgias.        Chronic   Psychiatric/Behavioral: Positive for depression. The patient is not nervous/anxious.        PHYSICAL EXAMINATION:   /85   Pulse 80   Temp 36.7 °C (98 °F) (Temporal)   Resp 20   SpO2 99%     Physical Exam   Constitutional: He is oriented to person, place, and time and well-developed, well-nourished, and in no distress.   Disheveled unkept   HENT:   Head: Normocephalic and atraumatic.   Eyes: Pupils are equal, round, and reactive to light. Conjunctivae and EOM are normal.   Neck: Neck supple.   Cardiovascular: Intact distal pulses.   Pulmonary/Chest: Effort normal.   Musculoskeletal: Normal range of motion.         General: Deformity and edema present.      Comments: Left charcot foot and ankle  1+pitting edema to bilateral LE   Neurological: He is alert and oriented to person, place, and time.   Skin: Skin is warm. There is erythema.   Large full-thickness wounds to bilateral lower extremities  Refer to wound flowsheet and photos   Psychiatric: Affect normal.   Speech somewhat pressured and disorganized       WOUND ASSESSMENT              Wound 06/05/20 Full Thickness Wound Leg Left LLE anterolateral (Active)   Wound Image    09/16/20 1415   Site Assessment Red;Yellow 09/16/20 1415   Periwound Assessment " Edema;Denuded;Maceration 09/16/20 1415   Margins Attached edges 09/16/20 1415   Closure Secondary intention 08/26/20 1300   Drainage Amount Large 09/16/20 1415   Drainage Description Serosanguineous;Green 09/16/20 1415   Treatments Cleansed;Topical Lidocaine;Provider debridement 09/16/20 1415   Wound Cleansing Foam Cleanser/Washcloth 09/16/20 1415   Periwound Protectant Barrier Paste 09/16/20 1415   Dressing Cleansing/Solutions Not Applicable 08/26/20 1300   Dressing Options Hydrofiber Silver;Absorbent Abdominal Pad;Unna Boot 09/16/20 1415   Dressing Options (Historical)  Wound Vac;Compression Wrap Two Layer 07/15/20 1430   Dressing Changed New 08/26/20 1300   Dressing Status Clean;Dry;Intact 08/26/20 1300   Dressing Change/Treatment Frequency Monday, Wednesday, Friday, and As Needed 07/15/20 1430   Non-staged Wound Description Full thickness 09/16/20 1415   Wound Length (cm) 12.5 cm 09/16/20 1415   Wound Width (cm) 5.4 cm 09/16/20 1415   Wound Depth (cm) 0.1 cm 09/16/20 1415   Wound Surface Area (cm^2) 67.5 cm^2 09/16/20 1415   Wound Volume (cm^3) 6.75 cm^3 09/16/20 1415   Post-Procedure Length (cm) 12.7 cm 09/16/20 1415   Post-Procedure Width (cm) 6 cm 09/16/20 1415   Post-Procedure Depth (cm) 0.2 cm 09/16/20 1415   Post-Procedure Surface Area (cm^2) 76.2 cm^2 09/16/20 1415   Post-Procedure Volume (cm^3) 15.24 cm^3 09/16/20 1415   Wound Healing % 88 09/16/20 1415   Wound Bed Granulation (%) 100 % 08/26/20 1300   Wound Bed Epithelium (%) 0 % 07/29/20 1450   Wound Bed Slough (%) 25 % 09/09/20 1401   Wound Bed Eschar (%) 0 % 07/29/20 1450   Wound Bed Granulation (%) - Post-Procedure 100 % 06/12/20 0945   Wound Bed Slough % - (Post-Procedure) 5 % 06/05/20 0930   Tunneling (cm) 0 cm 09/16/20 1415   Undermining (cm) 0 cm 09/16/20 1415   Wound Odor None 09/16/20 1415   Pulses Left;2+;DP 06/05/20 0930   Exposed Structures None 09/16/20 1415       Wound 06/05/20 Full Thickness Wound Ankle LLE distal posteriomedial (Active)    Wound Image    09/16/20 1415   Site Assessment Red;Yellow 09/16/20 1415   Periwound Assessment Edema;Maceration;Denuded 09/16/20 1415   Margins Attached edges 09/16/20 1415   Closure Secondary intention 08/26/20 1300   Drainage Amount Large 09/16/20 1415   Drainage Description Serosanguineous;Green 09/16/20 1415   Treatments Cleansed;Topical Lidocaine;Provider debridement 09/16/20 1415   Wound Cleansing Foam Cleanser/Washcloth 09/16/20 1415   Periwound Protectant Barrier Paste 09/16/20 1415   Dressing Cleansing/Solutions Not Applicable;Normal Saline 08/26/20 1300   Dressing Options Hydrofiber Silver;Absorbent Abdominal Pad;Unna Boot 09/16/20 1415   Dressing Options (Historical)  Hydrofera Blue Ready;Absorbent Abdominal Pad;Compression Wrap Two Layer 07/15/20 1430   Dressing Changed New 08/26/20 1300   Dressing Status Clean;Dry;Intact 08/26/20 1300   Dressing Change/Treatment Frequency Monday, Wednesday, Friday, and As Needed 07/15/20 1430   Non-staged Wound Description Full thickness 09/16/20 1415   Wound Length (cm) 2.3 cm 09/16/20 1415   Wound Width (cm) 4.6 cm 09/16/20 1415   Wound Depth (cm) 0.1 cm 09/16/20 1415   Wound Surface Area (cm^2) 10.58 cm^2 09/16/20 1415   Wound Volume (cm^3) 1.06 cm^3 09/16/20 1415   Post-Procedure Length (cm) 3 cm 09/16/20 1415   Post-Procedure Width (cm) 4.6 cm 09/16/20 1415   Post-Procedure Depth (cm) 0.2 cm 09/16/20 1415   Post-Procedure Surface Area (cm^2) 13.8 cm^2 09/16/20 1415   Post-Procedure Volume (cm^3) 2.76 cm^3 09/16/20 1415   Wound Healing % 82 09/16/20 1415   Wound Bed Granulation (%) 70 % 08/26/20 1300   Wound Bed Epithelium (%) 0 % 07/29/20 1450   Wound Bed Slough (%) 50 % 09/09/20 1401   Wound Bed Eschar (%) 0 % 07/29/20 1450   Wound Bed Granulation (%) - Post-Procedure 100 % 08/26/20 1300   Wound Bed Slough % - (Post-Procedure) 20 % 06/12/20 0945   Tunneling (cm) 0 cm 09/16/20 1415   Undermining (cm) 0 cm 09/16/20 1415   Wound Odor None 09/16/20 1415   Pulses  Left;2+;DP 06/05/20 0930   Exposed Structures None 09/16/20 1415       Wound 06/05/20 Full Thickness Wound Leg Left LLE posteromedial proximal (Active)   Wound Image    09/16/20 1415   Site Assessment Red;Yellow 09/16/20 1415   Periwound Assessment Maceration;Denuded;Edema 09/16/20 1415   Margins Attached edges 09/16/20 1415   Closure Secondary intention 08/26/20 1300   Drainage Amount Large 09/16/20 1415   Drainage Description Green;Serosanguineous 09/16/20 1415   Treatments Cleansed;Topical Lidocaine;Provider debridement 09/16/20 1415   Wound Cleansing Foam Cleanser/Washcloth 09/16/20 1415   Periwound Protectant Barrier Paste 09/16/20 1415   Dressing Cleansing/Solutions Not Applicable;Normal Saline 08/26/20 1300   Dressing Options Hydrofiber Silver;Absorbent Abdominal Pad;Unna Boot 09/16/20 1415   Dressing Options (Historical)  Hydrofera Blue Ready;Absorbent Abdominal Pad;Compression Wrap Two Layer 07/15/20 1430   Dressing Changed New 08/26/20 1300   Dressing Status Clean;Dry;Intact 08/26/20 1300   Dressing Change/Treatment Frequency Monday, Wednesday, Friday, and As Needed 08/26/20 1300   Non-staged Wound Description Full thickness 09/16/20 1415   Wound Length (cm) 7.5 cm 09/16/20 1415   Wound Width (cm) 8.5 cm 09/16/20 1415   Wound Depth (cm) 0.1 cm 09/16/20 1415   Wound Surface Area (cm^2) 63.75 cm^2 09/16/20 1415   Wound Volume (cm^3) 6.38 cm^3 09/16/20 1415   Post-Procedure Length (cm) 7.6 cm 09/16/20 1415   Post-Procedure Width (cm) 8.5 cm 09/16/20 1415   Post-Procedure Depth (cm) 0.2 cm 09/16/20 1415   Post-Procedure Surface Area (cm^2) 64.6 cm^2 09/16/20 1415   Post-Procedure Volume (cm^3) 12.92 cm^3 09/16/20 1415   Wound Healing % 16 09/16/20 1415   Wound Bed Granulation (%) 70 % 08/26/20 1300   Wound Bed Epithelium (%) 0 % 07/29/20 1450   Wound Bed Slough (%) 50 % 09/09/20 1401   Wound Bed Eschar (%) 0 % 07/29/20 1450   Wound Bed Granulation (%) - Post-Procedure 100 % 08/26/20 1300   Wound Bed Slough % -  (Post-Procedure) 20 % 06/12/20 0945   Tunneling (cm) 0 cm 09/16/20 1415   Undermining (cm) 0 cm 09/16/20 1415   Wound Odor None 09/16/20 1415   Pulses 2+;Left;DP 06/05/20 0930   Exposed Structures None 09/16/20 1415       Wound 09/16/20 Full Thickness Wound Leg Right -Right Anterior LE (Active)   Wound Image    09/16/20 1415   Site Assessment Red 09/16/20 1415   Periwound Assessment Edema 09/16/20 1415   Margins Attached edges 09/16/20 1415   Drainage Amount PATITO 09/16/20 1415   Drainage Description PATITO 09/16/20 1415   Treatments Cleansed;Topical Lidocaine;Provider debridement 09/16/20 1415   Wound Cleansing Foam Cleanser/Washcloth 09/16/20 1415   Periwound Protectant Barrier Paste 09/16/20 1415   Dressing Options Hydrofiber Silver;Unna Boot 09/16/20 1415   Wound Length (cm) 1.2 cm 09/16/20 1415   Wound Width (cm) 1 cm 09/16/20 1415   Wound Depth (cm) 0.1 cm 09/16/20 1415   Wound Surface Area (cm^2) 1.2 cm^2 09/16/20 1415   Wound Volume (cm^3) 0.12 cm^3 09/16/20 1415   Post-Procedure Length (cm) 1.2 cm 09/16/20 1415   Post-Procedure Width (cm) 1.1 cm 09/16/20 1415   Post-Procedure Depth (cm) 0.1 cm 09/16/20 1415   Post-Procedure Surface Area (cm^2) 1.32 cm^2 09/16/20 1415   Post-Procedure Volume (cm^3) 0.13 cm^3 09/16/20 1415   Tunneling (cm) 0 cm 09/16/20 1415   Undermining (cm) 0 cm 09/16/20 1415   Wound Odor None 09/16/20 1415   Exposed Structures None 09/16/20 1415                              PROCEDURE: Excisional debridement of right and left lower extremity wounds   -2% viscous lidocaine applied topically to wound beds for approximately 10 minutes prior to debridement  -Curette used to excise thick layer of slough from wound beds.  Excisional debridement was performed to remove devitalized tissue from all other wounds until healthy, bleeding tissue was visualized.  Total area debrided 94.48 cm² tissue debrided into the subcutaneous layer of all wounds.    -Bleeding controlled with manual pressure.    -Wounds  cleansed with normal saline  -Patient tolerated the procedure well, without c/o pain or discomfort.         Pertinent Labs and Diagnostics:    Labs:     A1c:   Lab Results   Component Value Date/Time    HBA1C 5.7 (H) 2020 11:22 AM      Imagin/3/2020-MRI left tibia-fibula with and without contrast  IMPRESSION:     Moderate lateral leg cellulitis and underlying myositis     No abscess or osteomyelitis    VASCULAR STUDIES: BHUPINDER 2019   Right.    Doppler waveform of the common femoral artery is of high amplitude and    triphasic.    Doppler waveforms at the ankle are brisk and triphasic.    Ankle-brachial index is normal.       Left.    Could not perform ankle-brachial index due to large blisters/ulcers.   Normal toe-brachial index: 0.94   Doppler waveform of the common femoral artery is of high amplitude and    triphasic.    Biphasic waveforms seen at the ankle.     WOUND CULTURE:    DATE: 2020-culture collected in clinic  Culture Result  Abnormal     Beta Hemolytic Streptococcus group A   Heavy growth     Culture Result  Abnormal     Staphylococcus aureus   Moderate growth     Culture Result  Abnormal     Serratia marcescens   Moderate growth     Culture Result  Abnormal     Proteus mirabilis   Moderate growth     Culture Result  Abnormal     Pseudomonas species   Light growth   unable to isolate for sensitivity            ASSESSMENT AND PLAN:     1. Skin ulcer of left lower leg with fat layer exposed (HCC)  Comment: Patient has history of recurring ulcerations to left lower extremity.  Has undergone ablation procedures by Dr. White.  Discharged from [facility] in 2020 due to healing, returned approximately 3 weeks later much worse.  Hospitalized from  until 2020 underwent multiple surgical debridements.    2020: Wound and periwound has deteriorated significantly since last assessment.  Increased drainage, odor, periwound erythema and denudation.  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  -Excisional debridement of wounds in clinic today, medically necessary to promote wound healing.  -Patient to return to clinic weekly for assessment and debridement  -Home health to change dressing 1-2 times per week in between clinic visits    Wound care: Ostomy powder and barrier paste to periwound to treat denudation, Silver Hydrofiber to manage exudate and bioburden, absorbent abdominal pad, Unna's boot     2. Skin ulcer of multiple sites of right lower extremity with fat layer exposed (HCC)  Comments: First observed in clinic on 6/5/2020, on initial evaluation. Patient is not certain when or how they occurred.  There is no documentation of wounds to this leg during recent hospitalization, so presumably they occurred after he went home.    9/16/2020: Reopening of small wound to right lower extremity  -Excisional debridement of wound in clinic today, medically necessary to promote wound healing.  -Patient to return to clinic weekly for assessment and debridement  -Patient to change dressing 1-2 times per week in between clinic visits    Wound care:  Silver Hydrofiber to manage exudate and bioburden,  Unna's boot      3. Chronic venous stasis  Comments: Hemosiderin staining, brawny edema, scarring- findings consistent with CVI.  Pt established at [facility], underwent ablation procedures in 2019.     -Consider referral back to vascular if wounds failed to progress or deteriorate.    4. Acquired deformity of left ankle and foot  Comments: Charcot deformity of foot and ankle.  Very little ROM of ankle.  Patient was referred to orthopedics previously, however did not make appointment.    -Patient will need to follow-up with orthopedics once wounds are healed.    5.  Wound infection     9/16/2020: Patient presents today with significant deterioration of his wounds, increased drainage (green, Pseudomonas-like odor), increased redness, increased  odor, increased periwound deterioration  -Rx for Cipro and Augmentin sent to patient's pharmacy        PATIENT EDUCATION  - Pt advised to go to ER for any increased redness, swelling, drainage or odor, or if he develops fever, chills, nausea or vomiting.    Patient was seen for 15 minutes face to face of which > 50% of appointment time was spent on counseling and coordination of care regarding the above.    Please note that this note may have been created using voice recognition software. I have worked with technical experts from [facility] to optimize the interface.  I have made every reasonable attempt to correct obvious errors, but there may be errors of grammar and possibly content that I did not discover before finalizing the note.    N

## 2020-09-16 NOTE — LETTER
September 16, 2020    To: [facility] (Fax #[phone])    Re: Goran Chavez (Date of Birth 1955)    Wound Care Order:    · Remove old dressings.  · Cleanse left and right leg wounds with normal saline and gauze. Pat dry.  · Apply zinc barrier cream to sandrine-wounds.  · Apply Hydrofiber Silver to wounds.  · Cover Hydrofiber Silver on left leg wounds with ABD pads.  · Apply Unna Boot to both lower legs. Ensure that top of Unna Boots are no more than two inches below the bend of the knees.    · Home Health to see patient twice per week for dressing changes.                ________________________________  Nancy ANGULO     71 year-old male with PMH of insulin-dependent DM c/b diabetic foot ulcers s/p amputations of half of left great toe and entire right great toe, PVD, HTN, HLD, GERD, depression, recently amputated right 3rd toe (Dec 4th) right 4th toe (Dec 11) s/p debridement on Dec 11th presented to the Ed for right foot discharge since yesterday.     # Diabetic foot Ulcer: Right foot  #Osteomyelitis  -no evidence of sepsis on admission  -cw meropenem and bactrim - pending ID eval   -XR R foot: OM w 5th metatarsal; questionable 3rd metatarsal involvement  - Arterial Duplex R: minimal stenosis of tibial artery  - Venous Duplex BL: no DVT  - Vascular on board: no intervention  - Podiatry: plan for OR monday - will keep NPO Sun after midnight   - fu blood cultures  - esr 54/ crp pend    # Insulin-dependent T2DM  -Takes Humalin 30 units at breakfast, Trulicity 0.5 weekly, and Janumet  BID outpatient  -cw basal bolus insulin at this time  -Monitor fingersticks with meals and at bedtime    # Hypertension  -Takes Benazepril 40 mg qD at home  -C/w Lisinopril 40 mg qD for now  -Monitor BP    # Hyperlipidemia  -Takes Crestor 10 mg qHS at home  -C/w Atorvastatin 40 mg qHS for now    # GERD  -cw PPI    # Depression  -not on any med  -fu outpt psych    DVT ppx: Heparin   GI ppx: Protonix  Diet: DASH/TLC consistent carb.   Activity: OOBTC  Code status: FULL CODE  Dispo: Med-surg. From home  Follow-up: Podiatry / ID consult 71 year-old male with PMH of insulin-dependent DM c/b diabetic foot ulcers s/p amputations of half of left great toe and entire right great toe, PVD, HTN, HLD, GERD, depression, recently amputated right 3rd toe (Dec 4th) right 4th toe (Dec 11) s/p debridement on Dec 11th presented to the Ed for right foot discharge since yesterday.     # Diabetic foot Ulcer: Right foot  #Osteomyelitis  -no evidence of sepsis on admission  -s/p meropenem and bactrim - ID wants to hold off Abx for now pending Dbx.   -XR R foot: OM w 5th metatarsal; questionable 3rd metatarsal involvement  - Arterial Duplex R: minimal stenosis of tibial artery  - Venous Duplex BL: no DVT  - Vascular on board: no intervention  - Podiatry: plan for OR monday - will keep NPO Sun after midnight   - fu blood cultures  - esr 54/ crp pend    # Insulin-dependent T2DM  -Takes Humalin 30 units at breakfast, Trulicity 0.5 weekly, and Janumet  BID outpatient  -cw basal bolus insulin at this time  -Monitor fingersticks with meals and at bedtime    # Hypertension  -Takes Benazepril 40 mg qD at home  -C/w Lisinopril 40 mg qD for now  -Monitor BP    # Hyperlipidemia  -Takes Crestor 10 mg qHS at home  -C/w Atorvastatin 40 mg qHS for now    # GERD  -cw PPI    # Depression  -not on any med  -fu outpt psych    DVT ppx: Heparin   GI ppx: Protonix  Diet: DASH/TLC consistent carb.   Activity: OOBTC  Code status: FULL CODE  Dispo: Med-surg. From home  Follow-up: Podiatry / ID consult

## 2020-09-30 ENCOUNTER — OFFICE VISIT (OUTPATIENT)
Dept: WOUND CARE | Facility: MEDICAL CENTER | Age: 65
End: 2020-09-30
Attending: NURSE PRACTITIONER
Payer: MEDICARE

## 2020-09-30 VITALS
TEMPERATURE: 98 F | DIASTOLIC BLOOD PRESSURE: 85 MMHG | HEART RATE: 97 BPM | OXYGEN SATURATION: 97 % | RESPIRATION RATE: 20 BRPM | SYSTOLIC BLOOD PRESSURE: 147 MMHG

## 2020-09-30 DIAGNOSIS — L97.922 SKIN ULCER OF LEFT LOWER LEG WITH FAT LAYER EXPOSED (HCC): Primary | ICD-10-CM

## 2020-09-30 DIAGNOSIS — L08.9 WOUND INFECTION: ICD-10-CM

## 2020-09-30 DIAGNOSIS — T14.8XXA WOUND INFECTION: ICD-10-CM

## 2020-09-30 DIAGNOSIS — M21.962 ACQUIRED DEFORMITY OF LEFT ANKLE AND FOOT: ICD-10-CM

## 2020-09-30 DIAGNOSIS — L97.912 SKIN ULCER OF MULTIPLE SITES OF RIGHT LOWER EXTREMITY WITH FAT LAYER EXPOSED (HCC): ICD-10-CM

## 2020-09-30 DIAGNOSIS — I87.8 CHRONIC VENOUS STASIS: ICD-10-CM

## 2020-09-30 PROCEDURE — 11045 DBRDMT SUBQ TISS EACH ADDL: CPT | Performed by: NURSE PRACTITIONER

## 2020-09-30 PROCEDURE — 11042 DBRDMT SUBQ TIS 1ST 20SQCM/<: CPT | Performed by: NURSE PRACTITIONER

## 2020-09-30 PROCEDURE — 11042 DBRDMT SUBQ TIS 1ST 20SQCM/<: CPT

## 2020-09-30 PROCEDURE — 11045 DBRDMT SUBQ TISS EACH ADDL: CPT

## 2020-09-30 ASSESSMENT — ENCOUNTER SYMPTOMS
DEPRESSION: 1
DIARRHEA: 0
FEVER: 0
NERVOUS/ANXIOUS: 0
MYALGIAS: 0
CHILLS: 0
NAUSEA: 0
COUGH: 0
SHORTNESS OF BREATH: 0
CLAUDICATION: 0
CONSTIPATION: 0
VOMITING: 0

## 2020-09-30 NOTE — PATIENT INSTRUCTIONS
Avoid prolonged standing or sitting without elevating your legs.  - Apply tubigrip to your R leg ending 2 fingers below back of knee without wrinkles.   - Knee-high gradient compression to legs  - Multilayer compression wrap to L eg. Do not get wet and keep on for the week. Only remove if temperature or sensation changes.   If compression needs to be removed, un-wrap it do not cut it off.     Should you experience any significant changes in your wound(s), such as infection (redness, swelling, localized heat, increased pain, fever > 101 F, chills) or have any questions regarding your home care instructions, please contact the wound center at [phone]. If after hours, contact your primary care physician or go to the hospital emergency room.   Keep dressing clean, dry and covered while bathing. Only change dressing if it becomes over saturated, soiled or falls off.

## 2020-09-30 NOTE — PROGRESS NOTES
Provider Encounter- Full Thickness wound    HISTORY OF PRESENT ILLNESS  Wound History:    START OF CARE IN CLINIC: 5/20/2020               REFERRING PROVIDER: KEV Keith                 WOUND ETIOLOGY: venous,  likely mixed etiology              LOCATION: Left lower leg, multiple wounds                                   Right lower leg, multiple wounds-first observed in clinic on initial evaluation, 6/5/2020.  Resolved 9/2/2020                HISTORY: Patient is very well-known to this clinic from previous treatment of wounds to his left lower extremity.  He was discharged from the clinic in March 2020 due to full resolution of his wound.  He returned to the clinic approximately 3 weeks later, on 4/16, with cellulitis, edema, and large open wounds to his left lower leg, and was sent directly to [facility] emergency room.  He was admitted for IV antibiotics and surgical intervention.  On 4/22/2020, he underwent an I&D of his left lower extremity, fasciotomy, and VAC placement.  In the following weeks he underwent surgery 3 more times for irrigation and debridement.  He was discharged home on 5/21, on the VAC, with home health and a referral to [facility].       Pertinent Medical History:  Anxiety, Charcot's joint of left foot, non-diabetic (3/21/2016), Chronic congestive heart failure (HCC) (11/16/2017), Hepatitis C, chronic (HCC), Hypertension, Migraine, Polysubstance abuse (HCC) (3/8/2018), Tobacco use (4/18/2016), Ulcer of left lower extremity with necrosis of muscle (HCC) (3/21/2016), and Venous stasis ulcer ([facility]) (2017).                ETIOLOGY HISTORY:  Vascular Surgeon: Dr. White. Compression Circ-aid. Varicose Veins none visible     TOBACCO USE: Patient denies; patient also denies alcohol and recreational drug use    Patient's problem list, allergies, and current medications reviewed and updated in Epic    Interval History:  6/5/2020: Initial clinic visit with KEV Brantley.  Patient  "returns to clinic after a long hospitalization.  The VAC was held by [facility] due to severe maceration and deterioration of wound beds.  He also presents today with new wounds to his right lower extremity.  He states he does not know how or when these occurred, states, \"they just popped up\".  He is uncertain whether or not he has follow-up appointments with his surgeon.  He does not know what medications he is on.      6/12/2020 : Clinic visit with KEV Brantley.  Goran states he is feeling well today,denies fevers, chills, nausea, vomiting, cough or shortness of breath.  Condition of his periwound skin is significantly improved.  I would like to restart VAC, however he did not bring the VAC with him, and that has already been on for 2-1/2 weeks.  Will likely need to be reordered.  [facility] continues to see him several times per week for dressing changes.  He is tolerating compression without any difficulty.    6/19/2020 : Clinic visit with KEV Brantley.   Goran states he is feeling well today,denies fevers, chills, nausea, vomiting, cough or shortness of breath.  He brought his VAC with him today.  However,  too much slough on wound beds to consider re-applying.  [facility] continues to see him 2 times per week.  I have asked him to bring the VAC with him again next week, and will hopefully place it at that time.  Per patient, dressings are saturated between dressing changes.    6/26/2020 : Clinic visit with KEV Brantley.   Goran states he is feeling well today,denies fevers, chills, nausea, vomiting, cough or shortness of breath.  He brought his VAC with him again today, however we are not able to put it on as he is still having twice drainage.  Sensitivities from culture still pending.  However I did start him on Augmentin today which we are able to obtain from [facility] prior to his leaving the clinic today and had him take one before he left.  [facility] is been seen 2 times per week " in between clinic visits.  We will try and get them to see him over the weekend to do an additional dressing change.  Will adjust antibiotics once sensitivities are known.  Hopefully start VAC next clinic visit.    7/2/2020 : Clinic visit with KEV Brantley.    Goran states he is feeling well today,denies fevers, chills, nausea, vomiting, cough or shortness of breath.  His VAC is here in the clinic, however he does not have another home health appointment until Monday, 4 days from now.  He has been taking Augmentin as prescribed last week.  I informed him that I added Bactrim DS after I got sensitivity results, and that it should be waiting for him at his pharmacy.    7/9/2020: Clinic visit with KEV Luna. Patient states that they are feeling well today.  Patient denies fever, chills, nausea, vomiting, lightheadedness, dizziness, shortness of breath and chest pain.  All wound beds debrided in clinic today patient's wounds continue to progress beefy red granulation tissue to left lower extremity lateral wound tendon is being covered with granulation tissue continue with wound VAC and current plan of care.    7/15/2020 : Clinic visit with KEV Brantley.  Goran states he is feeling well today,denies fevers, chills, nausea, vomiting, cough or shortness of breath.  Home health continues to see him in between clinic visits.  He states he has not been smoking, drinking or using any street drugs.    7/22/2020 : Clinic visit with KEV Brantley.   Goran states he is feeling well today,denies fevers, chills, nausea, vomiting, cough or shortness of breath.  Home health continues to see him in between clinic visits.    7/29/2020: Clinic visit with KEV Luna. Patient states that they are feeling well today.  Patient denies fever, chills, nausea, vomiting, lightheadedness, dizziness, shortness of breath and chest pain.    8/5/2020: Clinic visit with KEV Luna. Patient  states that they are feeling well today.  Patient denies fever, chills, nausea, vomiting, lightheadedness, dizziness, shortness of breath and chest pain.  Patient's wounds are foul-smelling today.  Patient had positive culture we have already ordered the patient Cipro and Augmentin patient reports that he got my message from last night.  Patient reports that he is can  antibiotics tonight in the ED initiate them.  Patient has been referred to infectious disease due to multiple bacterial growth and for their expert opinion.    8/20/2020 : Clinic visit with KEV Brantley.  Goran states he is feeling well, denies fevers, chills, nausea, vomiting, cough or shortness of breath.  He states he has not yet heard from ID.  However, review of referral note indicates that a message was left on his phone earlier today.  Kodi states he will check his message and will call them back.  He completed his antibiotics 2 days ago.    9/2/2020 : Clinic visit with KEV Brantley.   Goran states he is feeling well, denies fevers, chills, nausea, vomiting, cough or shortness of breath.  He was seen by ID yesterday, no new antibiotics ordered, however MRI ordered to assess for OM.  Goran has an appoint with his PCP tomorrow.    9/9/2020 : Clinic visit with KEV Brantley.  Goran states he is feeling well, denies fevers, chills, nausea, vomiting, cough or shortness of breath.  Reviewed MRI results with him in clinic today, no evidence of OM.    9/16/2020 : Clinic visit with KEV Brantley.  Goran presents today looking more disheveled than usual, he wore sunglasses throughout the visit.  His speech is a bit pressured and disorganized.  He denies using any street drugs or drinking alcohol.  His dressings are saturated, and his wounds have deteriorated significantly.  He denies fevers, chills, nausea, vomiting, cough or shortness of breath.  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  Home health continues to see him in between clinic visits for dressing changes.    9/30/2020 : Clinic visit with KEV Brantley.  Goran states he is feeling well today, denies fevers, chills, nausea, vomiting, cough or shortness of breath.  He missed his appointment last week, arrived too late and could not be seen.  He is again wearing his sunglasses throughout today's visit.  He denies using any street drugs or drinking alcohol.  Home health continues to see him in between clinic visits.      REVIEW OF SYSTEMS:   Review of Systems   Constitutional: Negative for chills and fever.        States he is eating well, appetite normal   Respiratory: Negative for cough and shortness of breath.    Cardiovascular: Positive for leg swelling. Negative for chest pain and claudication.        Chronic swelling in legs, for several years   Gastrointestinal: Negative for constipation, diarrhea, nausea and vomiting.   Genitourinary: Negative for dysuria.   Musculoskeletal: Positive for joint pain. Negative for myalgias.        Chronic   Psychiatric/Behavioral: Positive for depression. The patient is not nervous/anxious.        PHYSICAL EXAMINATION:   /85   Pulse 97   Temp 36.7 °C (98 °F)   Resp 20   SpO2 97%     Physical Exam   Constitutional: He is oriented to person, place, and time and well-developed, well-nourished, and in no distress.   Disheveled unkept   HENT:   Head: Normocephalic and atraumatic.   Eyes: Pupils are equal, round, and reactive to light. Conjunctivae and EOM are normal.   Neck: Neck supple.   Cardiovascular: Intact distal pulses.   Pulmonary/Chest: Effort normal.   Musculoskeletal: Normal range of motion.         General: Deformity and edema present.      Comments: Left charcot foot and ankle  1+pitting edema to bilateral LE   Neurological: He is alert and oriented to person, place, and time.   Skin: Skin is " warm. There is erythema.   Large full-thickness wounds to bilateral lower extremities  Refer to wound flowsheet and photos   Psychiatric: Affect normal.   Speech somewhat pressured and disorganized       WOUND ASSESSMENT           Wound 06/05/20 Full Thickness Wound Leg Left LLE anterolateral (Active)   Wound Image    09/30/20 1400   Site Assessment Red;Yellow 09/30/20 1400   Periwound Assessment Edema;Denuded;Maceration 09/30/20 1400   Margins Attached edges 09/30/20 1400   Closure Secondary intention 09/30/20 1400   Drainage Amount Large 09/30/20 1400   Drainage Description Serosanguineous;Green;Yellow 09/30/20 1400   Treatments Cleansed;Provider debridement;Topical Lidocaine 09/30/20 1400   Wound Cleansing Puracyn [place] 09/30/20 1400   Periwound Protectant Barrier Paste 09/30/20 1400   Dressing Cleansing/Solutions Normal Saline 09/30/20 1400   Dressing Options Hydrofiber Silver;Absorbent Abdominal Pad;Unna Boot 09/30/20 1400   Dressing Options (Historical)  Wound Vac;Compression Wrap Two Layer 07/15/20 1430   Dressing Changed Changed 09/30/20 1400   Dressing Status Clean;Dry;Intact 09/30/20 1400   Dressing Change/Treatment Frequency Monday, Wednesday, Friday, and As Needed 09/30/20 1400   Non-staged Wound Description Full thickness 09/30/20 1400   Wound Length (cm) 11.5 cm 09/30/20 1400   Wound Width (cm) 4.3 cm 09/30/20 1400   Wound Depth (cm) 0.1 cm 09/30/20 1400   Wound Surface Area (cm^2) 49.45 cm^2 09/30/20 1400   Wound Volume (cm^3) 4.94 cm^3 09/30/20 1400   Post-Procedure Length (cm) 11.3 cm 09/30/20 1400   Post-Procedure Width (cm) 4.5 cm 09/30/20 1400   Post-Procedure Depth (cm) 0.3 cm 09/30/20 1400   Post-Procedure Surface Area (cm^2) 50.85 cm^2 09/30/20 1400   Post-Procedure Volume (cm^3) 15.26 cm^3 09/30/20 1400   Wound Healing % 91 09/30/20 1400   Wound Bed Granulation (%) 20 % 09/30/20 1400   Wound Bed Epithelium (%) 0 % 07/29/20 1450   Wound Bed Slough (%) 80 % 09/30/20 1400   Wound Bed Eschar (%)  0 % 07/29/20 1450   Wound Bed Granulation (%) - Post-Procedure 100 % 09/30/20 1400   Wound Bed Slough % - (Post-Procedure) 5 % 06/05/20 0930   Tunneling (cm) 0 cm 09/30/20 1400   Undermining (cm) 0 cm 09/30/20 1400   Wound Odor Mild 09/30/20 1400   Pulses Left;2+;DP 06/05/20 0930   Exposed Structures None 09/30/20 1400       Wound 06/05/20 Full Thickness Wound Ankle LLE distal posteriomedial (Active)   Wound Image    09/30/20 1400   Site Assessment Red;Yellow 09/30/20 1400   Periwound Assessment Edema;Maceration;Denuded 09/30/20 1400   Margins Attached edges 09/30/20 1400   Closure Secondary intention 09/30/20 1400   Drainage Amount Large 09/30/20 1400   Drainage Description Serosanguineous;Green;Yellow 09/30/20 1400   Treatments Cleansed;Provider debridement;Topical Lidocaine 09/30/20 1400   Wound Cleansing Puracyn [place] 09/30/20 1400   Periwound Protectant Barrier Paste 09/30/20 1400   Dressing Cleansing/Solutions Normal Saline 09/30/20 1400   Dressing Options Hydrofiber Silver;Absorbent Abdominal Pad;Unna Boot 09/30/20 1400   Dressing Options (Historical)  Hydrofera Blue Ready;Absorbent Abdominal Pad;Compression Wrap Two Layer 07/15/20 1430   Dressing Changed New 08/26/20 1300   Dressing Status Clean;Dry;Intact 08/26/20 1300   Dressing Change/Treatment Frequency Monday, Wednesday, Friday, and As Needed 07/15/20 1430   Non-staged Wound Description Full thickness 09/30/20 1400   Wound Length (cm) 3 cm 09/30/20 1400   Wound Width (cm) 5 cm 09/30/20 1400   Wound Depth (cm) 0.2 cm 09/30/20 1400   Wound Surface Area (cm^2) 15 cm^2 09/30/20 1400   Wound Volume (cm^3) 3 cm^3 09/30/20 1400   Post-Procedure Length (cm) 3.5 cm 09/30/20 1400   Post-Procedure Width (cm) 2 cm 09/30/20 1400   Post-Procedure Depth (cm) 0.4 cm 09/30/20 1400   Post-Procedure Surface Area (cm^2) 7 cm^2 09/30/20 1400   Post-Procedure Volume (cm^3) 2.8 cm^3 09/30/20 1400   Wound Healing % 49 09/30/20 1400   Wound Bed Granulation (%) 10 % 09/30/20  1400   Wound Bed Epithelium (%) 0 % 07/29/20 1450   Wound Bed Slough (%) 90 % 09/30/20 1400   Wound Bed Eschar (%) 0 % 07/29/20 1450   Wound Bed Granulation (%) - Post-Procedure 100 % 09/30/20 1400   Wound Bed Slough % - (Post-Procedure) 20 % 06/12/20 0945   Tunneling (cm) 0 cm 09/30/20 1400   Undermining (cm) 0 cm 09/30/20 1400   Wound Odor Mild 09/30/20 1400   Pulses Left;2+;DP 06/05/20 0930   Exposed Structures None 09/30/20 1400       Wound 06/05/20 Full Thickness Wound Leg Left LLE posteromedial proximal (Active)   Wound Image    09/30/20 1400   Site Assessment Red;Yellow 09/30/20 1400   Periwound Assessment Maceration;Denuded;Edema 09/30/20 1400   Margins Attached edges 09/30/20 1400   Closure Secondary intention 09/30/20 1400   Drainage Amount Large 09/30/20 1400   Drainage Description Green;Serosanguineous;Yellow 09/30/20 1400   Treatments Cleansed;Provider debridement;Topical Lidocaine 09/30/20 1400   Wound Cleansing Puracyn [place] 09/30/20 1400   Periwound Protectant Barrier Paste 09/30/20 1400   Dressing Cleansing/Solutions Normal Saline 09/30/20 1400   Dressing Options Hydrofiber Silver;Absorbent Abdominal Pad;Unna Boot 09/30/20 1400   Dressing Options (Historical)  Hydrofera Blue Ready;Absorbent Abdominal Pad;Compression Wrap Two Layer 07/15/20 1430   Dressing Changed Changed 09/30/20 1400   Dressing Status Clean;Dry;Intact 09/30/20 1400   Dressing Change/Treatment Frequency Monday, Wednesday, Friday, and As Needed 08/26/20 1300   Non-staged Wound Description Full thickness 09/30/20 1400   Wound Length (cm) 7 cm 09/30/20 1400   Wound Width (cm) 9 cm 09/30/20 1400   Wound Depth (cm) 0.1 cm 09/30/20 1400   Wound Surface Area (cm^2) 63 cm^2 09/30/20 1400   Wound Volume (cm^3) 6.3 cm^3 09/30/20 1400   Post-Procedure Length (cm) 6.5 cm 09/30/20 1400   Post-Procedure Width (cm) 6.5 cm 09/30/20 1400   Post-Procedure Depth (cm) 0.4 cm 09/30/20 1400   Post-Procedure Surface Area (cm^2) 42.25 cm^2 09/30/20 1400    Post-Procedure Volume (cm^3) 16.9 cm^3 09/30/20 1400   Wound Healing % 17 09/30/20 1400   Wound Bed Granulation (%) 10 % 09/30/20 1400   Wound Bed Epithelium (%) 0 % 07/29/20 1450   Wound Bed Slough (%) 90 % 09/30/20 1400   Wound Bed Eschar (%) 0 % 07/29/20 1450   Wound Bed Granulation (%) - Post-Procedure 100 % 09/30/20 1400   Wound Bed Slough % - (Post-Procedure) 20 % 06/12/20 0945   Tunneling (cm) 0 cm 09/30/20 1400   Undermining (cm) 0 cm 09/30/20 1400   Wound Odor None 09/30/20 1400   Pulses 2+;Left;DP 06/05/20 0930   Exposed Structures None 09/30/20 1400       Wound 09/16/20 Full Thickness Wound Leg Right -Right Anterior LE (Active)   Wound Image   09/30/20 1400   Site Assessment Epithelialization 09/30/20 1400   Periwound Assessment Dry;Intact 09/30/20 1400   Margins Attached edges 09/16/20 1415   Drainage Amount None 09/30/20 1400   Drainage Description PATITO 09/16/20 1415   Treatments Cleansed;Topical Lidocaine;Provider debridement 09/16/20 1415   Wound Cleansing Foam Cleanser/Washcloth 09/30/20 1400   Periwound Protectant Barrier Paste 09/16/20 1415   Dressing Options Unna Boot;Open to Air 09/30/20 1400   Wound Length (cm) 1.2 cm 09/16/20 1415   Wound Width (cm) 1 cm 09/16/20 1415   Wound Depth (cm) 0.1 cm 09/16/20 1415   Wound Surface Area (cm^2) 1.2 cm^2 09/16/20 1415   Wound Volume (cm^3) 0.12 cm^3 09/16/20 1415   Post-Procedure Length (cm) 1.2 cm 09/16/20 1415   Post-Procedure Width (cm) 1.1 cm 09/16/20 1415   Post-Procedure Depth (cm) 0.1 cm 09/16/20 1415   Post-Procedure Surface Area (cm^2) 1.32 cm^2 09/16/20 1415   Post-Procedure Volume (cm^3) 0.13 cm^3 09/16/20 1415   Tunneling (cm) 0 cm 09/16/20 1415   Undermining (cm) 0 cm 09/16/20 1415   Wound Odor None 09/16/20 1415   Exposed Structures None 09/16/20 1415                        PROCEDURE: Excisional debridement of right and left lower extremity wounds   -2% viscous lidocaine applied topically to wound beds for approximately 10 minutes prior to  debridement  -Curette used to excise thick layer of slough from wound beds.  Excisional debridement was performed to remove devitalized tissue from all other wounds until healthy, bleeding tissue was visualized.  Total area debrided 100.1 cm² tissue debrided into the subcutaneous layer of all wounds.    -Bleeding controlled with manual pressure.    -Wounds cleansed with normal saline  -Patient tolerated the procedure well, without c/o pain or discomfort.         Pertinent Labs and Diagnostics:    Labs:     A1c:   Lab Results   Component Value Date/Time    HBA1C 5.7 (H) 2020 11:22 AM      Imagin/3/2020-MRI left tibia-fibula with and without contrast  IMPRESSION:     Moderate lateral leg cellulitis and underlying myositis     No abscess or osteomyelitis    VASCULAR STUDIES: BHUPINDER 2019   Right.    Doppler waveform of the common femoral artery is of high amplitude and    triphasic.    Doppler waveforms at the ankle are brisk and triphasic.    Ankle-brachial index is normal.       Left.    Could not perform ankle-brachial index due to large blisters/ulcers.   Normal toe-brachial index: 0.94   Doppler waveform of the common femoral artery is of high amplitude and    triphasic.    Biphasic waveforms seen at the ankle.     WOUND CULTURE:    DATE: 2020-culture collected in clinic  Culture Result  Abnormal     Beta Hemolytic Streptococcus group A   Heavy growth     Culture Result  Abnormal     Staphylococcus aureus   Moderate growth     Culture Result  Abnormal     Serratia marcescens   Moderate growth     Culture Result  Abnormal     Proteus mirabilis   Moderate growth     Culture Result  Abnormal     Pseudomonas species   Light growth   unable to isolate for sensitivity            ASSESSMENT AND PLAN:     1. Skin ulcer of left lower leg with fat layer exposed (HCC)  Comment: Patient has history of recurring ulcerations to left lower extremity.  Has undergone ablation procedures by Dr. White.   Discharged from [facility] in March 2020 due to healing, returned approximately 3 weeks later much worse.  Hospitalized from 4/16 until 5/21/2020 underwent multiple surgical debridements.    9/30/2020: Thick layer of slough buildup on all wounds.  He missed his appointment last week.  Area of wounds has decreased however.  -Excisional debridement of wounds in clinic today, medically necessary to promote wound healing.  -Patient to return to clinic weekly for assessment and debridement  -Home health to change dressing 1-2 times per week in between clinic visits    Wound care: Silver Hydrofiber to manage exudate and bioburden, foam cover dressing, 2 layer compression wrap manage edema     2. Skin ulcer of multiple sites of right lower extremity with fat layer exposed (HCC)  Comments: First observed in clinic on 6/5/2020, on initial evaluation. Patient is not certain when or how they occurred.  There is no documentation of wounds to this leg during recent hospitalization, so presumably they occurred after he went home.    9/30/2020: Right lower extremity wounds are resolved  -No need for debridement today    Wound care: Open to air.  Tubigrip to manage edema      3. Chronic venous stasis  Comments: Hemosiderin staining, brawny edema, scarring- findings consistent with CVI.  Pt established at [facility], underwent ablation procedures in 2019.     -Consider referral back to vascular if wounds failed to progress or deteriorate.    4. Acquired deformity of left ankle and foot  Comments: Charcot deformity of foot and ankle.  Very little ROM of ankle.  Patient was referred to orthopedics previously, however did not make appointment.    -Patient will need to follow-up with orthopedics once wounds are healed.    5.  Wound infection     9/30/2020: Patient completed 10-day course of Cipro and Augmentin.  No signs or symptoms of infection today  -Monitor for signs and symptoms of infection each clinic visit        PATIENT EDUCATION  -  Pt advised to go to ER for any increased redness, swelling, drainage or odor, or if he develops fever, chills, nausea or vomiting.        Please note that this note may have been created using voice recognition software. I have worked with technical experts from [facility] to optimize the interface.  I have made every reasonable attempt to correct obvious errors, but there may be errors of grammar and possibly content that I did not discover before finalizing the note.    N

## 2020-09-30 NOTE — LETTER
September 30, 2020        Goran Chavez    To: [facility] (Fax #[phone])    Re: Goran CALVO Scott (Date of Birth 1955)    Wound Care Order:    ( RLE wound resolved - apply tubigrip or patients own compression stocking)    · Remove old dressings.  · Cleanse left leg wounds with normal saline and gauze. Pat dry.  · Apply zinc barrier cream to sandrine-wounds.  · Apply Hydrofiber Silver to wounds.  · Cover Hydrofiber Silver on left leg wounds with ABD pads.  · Apply Unna Boot . Ensure that top of Unna Boot is no more than two inches below the bend of the knees.    · Home Health to see patient twice per week for dressing changes.                ________________________________  Nancy ANGULO

## 2020-10-09 ENCOUNTER — OFFICE VISIT (OUTPATIENT)
Dept: WOUND CARE | Facility: MEDICAL CENTER | Age: 65
End: 2020-10-09
Attending: NURSE PRACTITIONER
Payer: MEDICARE

## 2020-10-09 VITALS
TEMPERATURE: 97.9 F | HEART RATE: 84 BPM | DIASTOLIC BLOOD PRESSURE: 92 MMHG | SYSTOLIC BLOOD PRESSURE: 142 MMHG | OXYGEN SATURATION: 98 % | RESPIRATION RATE: 20 BRPM

## 2020-10-09 DIAGNOSIS — L97.929 VENOUS STASIS ULCER OF LEFT LOWER EXTREMITY (HCC): ICD-10-CM

## 2020-10-09 DIAGNOSIS — M21.962 ACQUIRED DEFORMITY OF LEFT ANKLE AND FOOT: ICD-10-CM

## 2020-10-09 DIAGNOSIS — L08.9 WOUND INFECTION: ICD-10-CM

## 2020-10-09 DIAGNOSIS — T14.8XXA WOUND INFECTION: ICD-10-CM

## 2020-10-09 DIAGNOSIS — L97.922 SKIN ULCER OF LEFT LOWER LEG WITH FAT LAYER EXPOSED (HCC): ICD-10-CM

## 2020-10-09 DIAGNOSIS — I83.029 VENOUS STASIS ULCER OF LEFT LOWER EXTREMITY (HCC): ICD-10-CM

## 2020-10-09 DIAGNOSIS — I10 ESSENTIAL HYPERTENSION: ICD-10-CM

## 2020-10-09 PROCEDURE — 11042 DBRDMT SUBQ TIS 1ST 20SQCM/<: CPT | Performed by: NURSE PRACTITIONER

## 2020-10-09 PROCEDURE — 99213 OFFICE O/P EST LOW 20 MIN: CPT | Mod: 25 | Performed by: NURSE PRACTITIONER

## 2020-10-09 PROCEDURE — 99213 OFFICE O/P EST LOW 20 MIN: CPT | Mod: 25

## 2020-10-09 PROCEDURE — 87077 CULTURE AEROBIC IDENTIFY: CPT | Mod: 91

## 2020-10-09 PROCEDURE — 87186 SC STD MICRODIL/AGAR DIL: CPT

## 2020-10-09 PROCEDURE — 11045 DBRDMT SUBQ TISS EACH ADDL: CPT

## 2020-10-09 PROCEDURE — 87205 SMEAR GRAM STAIN: CPT

## 2020-10-09 PROCEDURE — 11045 DBRDMT SUBQ TISS EACH ADDL: CPT | Performed by: NURSE PRACTITIONER

## 2020-10-09 PROCEDURE — 11042 DBRDMT SUBQ TIS 1ST 20SQCM/<: CPT

## 2020-10-09 PROCEDURE — 87070 CULTURE OTHR SPECIMN AEROBIC: CPT

## 2020-10-09 RX ORDER — AMOXICILLIN AND CLAVULANATE POTASSIUM 875; 125 MG/1; MG/1
1 TABLET, FILM COATED ORAL 2 TIMES DAILY
Qty: 14 TAB | Refills: 0 | Status: SHIPPED | OUTPATIENT
Start: 2020-10-09 | End: 2020-10-16

## 2020-10-09 RX ORDER — CIPROFLOXACIN 500 MG/1
500 TABLET, FILM COATED ORAL 2 TIMES DAILY
Qty: 14 TAB | Refills: 0 | Status: SHIPPED | OUTPATIENT
Start: 2020-10-09 | End: 2020-10-13

## 2020-10-09 ASSESSMENT — ENCOUNTER SYMPTOMS
FEVER: 0
CHILLS: 0
VOMITING: 0
CONSTIPATION: 0
CLAUDICATION: 0
DIARRHEA: 0
NERVOUS/ANXIOUS: 0
DEPRESSION: 1
COUGH: 0
NAUSEA: 0
MYALGIAS: 0
SHORTNESS OF BREATH: 0

## 2020-10-09 ASSESSMENT — PAIN SCALES - GENERAL: PAINLEVEL: 4=SLIGHT-MODERATE PAIN

## 2020-10-09 NOTE — PROGRESS NOTES
Wound culture collected from left anterolateral LE wound and sent to lab.    Gave patient phone #[phone] and instructed him to call and schedule his ECG as soon as possible.

## 2020-10-09 NOTE — LETTER
October 9, 2020    To: [facility] (fax #[phone])    Re: Goran Chavez (Date of Birth 1955)    Wound Care Order:    · Remove old dressings.  · Cleanse left leg wounds with Puracyn Spray. Pat dry.  · Apply zinc barrier cream to sandrine-wounds.  · Moisten gauze with 1/4 Strength Dakin's Solution or Puracyn Spray, then apply moistened gauze to wounds.  · Cover gauze with ABD pads.  · Secure ABD pads with roll gauze.  · Secure roll gauze with hypafix tape.  · Apply tubigrip D to right lower leg. Ensure that top of tubigrip is no more than two inches below the bend of the knee.  · Patient has been instructed to change his dressings twice per day.  · Home Health to see patient twice per week for dressing changes.            ________________________  Miguel ANGULO

## 2020-10-09 NOTE — PROGRESS NOTES
Wound care order faxed to [facility] at fax #[phone].

## 2020-10-09 NOTE — PROGRESS NOTES
Provider Encounter- Full Thickness wound    HISTORY OF PRESENT ILLNESS  Wound History:    START OF CARE IN CLINIC: 5/20/2020               REFERRING PROVIDER: KEV Keith                 WOUND ETIOLOGY: venous,  likely mixed etiology              LOCATION: Left lower leg, multiple wounds                                   Right lower leg, multiple wounds-first observed in clinic on initial evaluation, 6/5/2020.  Resolved 9/2/2020                HISTORY: Patient is very well-known to this clinic from previous treatment of wounds to his left lower extremity.  He was discharged from the clinic in March 2020 due to full resolution of his wound.  He returned to the clinic approximately 3 weeks later, on 4/16, with cellulitis, edema, and large open wounds to his left lower leg, and was sent directly to [facility] emergency room.  He was admitted for IV antibiotics and surgical intervention.  On 4/22/2020, he underwent an I&D of his left lower extremity, fasciotomy, and VAC placement.  In the following weeks he underwent surgery 3 more times for irrigation and debridement.  He was discharged home on 5/21, on the VAC, with home health and a referral to [facility].       Pertinent Medical History:  Anxiety, Charcot's joint of left foot, non-diabetic (3/21/2016), Chronic congestive heart failure (HCC) (11/16/2017), Hepatitis C, chronic (HCC), Hypertension, Migraine, Polysubstance abuse (HCC) (3/8/2018), Tobacco use (4/18/2016), Ulcer of left lower extremity with necrosis of muscle (HCC) (3/21/2016), and Venous stasis ulcer ([facility]) (2017).                ETIOLOGY HISTORY:  Vascular Surgeon: Dr. White. Compression Circ-aid. Varicose Veins none visible     TOBACCO USE: Patient denies; patient also denies alcohol and recreational drug use    Patient's problem list, allergies, and current medications reviewed and updated in Epic    Interval History:  6/5/2020: Initial clinic visit with KEV Brantley.  Patient  "returns to clinic after a long hospitalization.  The VAC was held by [facility] due to severe maceration and deterioration of wound beds.  He also presents today with new wounds to his right lower extremity.  He states he does not know how or when these occurred, states, \"they just popped up\".  He is uncertain whether or not he has follow-up appointments with his surgeon.  He does not know what medications he is on.      6/12/2020 : Clinic visit with KEV Brantley.  Goran states he is feeling well today,denies fevers, chills, nausea, vomiting, cough or shortness of breath.  Condition of his periwound skin is significantly improved.  I would like to restart VAC, however he did not bring the VAC with him, and that has already been on for 2-1/2 weeks.  Will likely need to be reordered.  [facility] continues to see him several times per week for dressing changes.  He is tolerating compression without any difficulty.    6/19/2020 : Clinic visit with KEV Brantley.   Goran states he is feeling well today,denies fevers, chills, nausea, vomiting, cough or shortness of breath.  He brought his VAC with him today.  However,  too much slough on wound beds to consider re-applying.  [facility] continues to see him 2 times per week.  I have asked him to bring the VAC with him again next week, and will hopefully place it at that time.  Per patient, dressings are saturated between dressing changes.    6/26/2020 : Clinic visit with KEV Brantley.   Goran states he is feeling well today,denies fevers, chills, nausea, vomiting, cough or shortness of breath.  He brought his VAC with him again today, however we are not able to put it on as he is still having twice drainage.  Sensitivities from culture still pending.  However I did start him on Augmentin today which we are able to obtain from [facility] prior to his leaving the clinic today and had him take one before he left.  [facility] is been seen 2 times per week " in between clinic visits.  We will try and get them to see him over the weekend to do an additional dressing change.  Will adjust antibiotics once sensitivities are known.  Hopefully start VAC next clinic visit.    7/2/2020 : Clinic visit with KEV Brantley.    Goran states he is feeling well today,denies fevers, chills, nausea, vomiting, cough or shortness of breath.  His VAC is here in the clinic, however he does not have another home health appointment until Monday, 4 days from now.  He has been taking Augmentin as prescribed last week.  I informed him that I added Bactrim DS after I got sensitivity results, and that it should be waiting for him at his pharmacy.    7/9/2020: Clinic visit with KEV Luna. Patient states that they are feeling well today.  Patient denies fever, chills, nausea, vomiting, lightheadedness, dizziness, shortness of breath and chest pain.  All wound beds debrided in clinic today patient's wounds continue to progress beefy red granulation tissue to left lower extremity lateral wound tendon is being covered with granulation tissue continue with wound VAC and current plan of care.    7/15/2020 : Clinic visit with KEV Brantley.  Goran states he is feeling well today,denies fevers, chills, nausea, vomiting, cough or shortness of breath.  Home health continues to see him in between clinic visits.  He states he has not been smoking, drinking or using any street drugs.    7/22/2020 : Clinic visit with KEV Brantley.   Goran states he is feeling well today,denies fevers, chills, nausea, vomiting, cough or shortness of breath.  Home health continues to see him in between clinic visits.    7/29/2020: Clinic visit with KEV Luna. Patient states that they are feeling well today.  Patient denies fever, chills, nausea, vomiting, lightheadedness, dizziness, shortness of breath and chest pain.    8/5/2020: Clinic visit with KEV Luna. Patient  states that they are feeling well today.  Patient denies fever, chills, nausea, vomiting, lightheadedness, dizziness, shortness of breath and chest pain.  Patient's wounds are foul-smelling today.  Patient had positive culture we have already ordered the patient Cipro and Augmentin patient reports that he got my message from last night.  Patient reports that he is can  antibiotics tonight in the ED initiate them.  Patient has been referred to infectious disease due to multiple bacterial growth and for their expert opinion.    8/20/2020 : Clinic visit with KEV Brantley.  Goran states he is feeling well, denies fevers, chills, nausea, vomiting, cough or shortness of breath.  He states he has not yet heard from ID.  However, review of referral note indicates that a message was left on his phone earlier today.  Kodi states he will check his message and will call them back.  He completed his antibiotics 2 days ago.    9/2/2020 : Clinic visit with KEV Brantley.   Goran states he is feeling well, denies fevers, chills, nausea, vomiting, cough or shortness of breath.  He was seen by ID yesterday, no new antibiotics ordered, however MRI ordered to assess for OM.  Goran has an appoint with his PCP tomorrow.    9/9/2020 : Clinic visit with KEV Brantley.  Goran states he is feeling well, denies fevers, chills, nausea, vomiting, cough or shortness of breath.  Reviewed MRI results with him in clinic today, no evidence of OM.    9/16/2020 : Clinic visit with KEV Brantley.  Goran presents today looking more disheveled than usual, he wore sunglasses throughout the visit.  His speech is a bit pressured and disorganized.  He denies using any street drugs or drinking alcohol.  His dressings are saturated, and his wounds have deteriorated significantly.  He denies fevers, chills, nausea, vomiting, cough or shortness of breath.  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  Home health continues to see him in between clinic visits for dressing changes.    9/30/2020 : Clinic visit with KEV Brantley.  Goran states he is feeling well today, denies fevers, chills, nausea, vomiting, cough or shortness of breath.  He missed his appointment last week, arrived too late and could not be seen.  He is again wearing his sunglasses throughout today's visit.  He denies using any street drugs or drinking alcohol.  Home health continues to see him in between clinic visits.    10/9/2020: Clinic visit with Abeba ANGULO. Pt denies fevers, chills, nausea, vomiting. Followed by HH. Presents with foul smelling fluorescent green/yellow drainage from wounds and on dressings.       REVIEW OF SYSTEMS:   Review of Systems   Constitutional: Negative for chills and fever.        States he is eating well, appetite normal   Respiratory: Negative for cough and shortness of breath.    Cardiovascular: Positive for leg swelling. Negative for chest pain and claudication.        Chronic swelling in legs, for several years   Gastrointestinal: Negative for constipation, diarrhea, nausea and vomiting.   Genitourinary: Negative for dysuria.   Musculoskeletal: Positive for joint pain. Negative for myalgias.        Chronic   Psychiatric/Behavioral: Positive for depression. The patient is not nervous/anxious.        PHYSICAL EXAMINATION:   /92 (BP Location: Left arm, Patient Position: Sitting)   Pulse 84   Temp 36.6 °C (97.9 °F) (Temporal)   Resp 20   SpO2 98%     Physical Exam   Constitutional: He is oriented to person, place, and time and well-developed, well-nourished, and in no distress.   Disheveled unkept   HENT:   Head: Normocephalic and atraumatic.   Eyes: Pupils are equal, round, and reactive to light. Conjunctivae and EOM are normal.   Neck: Neck supple.   Cardiovascular: Intact distal pulses. "   Pulmonary/Chest: Effort normal.   Musculoskeletal: Normal range of motion.         General: Deformity and edema present.      Comments: Left charcot foot and ankle  1+pitting edema to bilateral LE   Neurological: He is alert and oriented to person, place, and time.   Skin: Skin is warm. There is erythema.   Large full-thickness wounds to bilateral lower extremities  Refer to wound flowsheet and photos  Odor  Green drainage    Psychiatric: Affect normal.   Speech somewhat pressured and disorganized       WOUND ASSESSMENT                    Wound 06/05/20 Full Thickness Wound Leg Left LLE anterolateral (Active)   Wound Image    10/09/20 1430   Site Assessment Red;Yellow 10/09/20 1430   Periwound Assessment Maceration;Edema 10/09/20 1430   Margins Attached edges 10/09/20 1430   Closure Secondary intention 09/30/20 1400   Drainage Amount Large 10/09/20 1430   Drainage Description Yellow;Green 10/09/20 1430   Treatments Cleansed;Topical Lidocaine;Provider debridement 10/09/20 1430   Wound Cleansing Dakin's Solution 10/09/20 1430   Periwound Protectant Barrier Paste 10/09/20 1430   Dressing Cleansing/Solutions 1/4 Strength Dakin's Solution 10/09/20 1430   Dressing Options Dry Gauze;Absorbent Abdominal Pad;Dry Roll Gauze;Hypafix Tape;Tubigrip 10/09/20 1430   Dressing Options (Historical)  Wound Vac;Compression Wrap Two Layer 07/15/20 1430   Dressing Changed Changed 09/30/20 1400   Dressing Status Clean;Dry;Intact 09/30/20 1400   Dressing Change/Treatment Frequency Monday, Wednesday, Friday, and As Needed 09/30/20 1400   Non-staged Wound Description Full thickness 10/09/20 1430   Wound Length (cm) 11.4 cm 10/09/20 1430   Wound Width (cm) 4.5 cm 10/09/20 1430   Wound Depth (cm) 0.1 cm 10/09/20 1430   Wound Surface Area (cm^2) 51.3 cm^2 10/09/20 1430   Wound Volume (cm^3) 5.13 cm^3 10/09/20 1430   Post-Procedure Length (cm) 11.5 cm 10/09/20 1430   Post-Procedure Width (cm) 4.9 cm 10/09/20 1430   Post-Procedure Depth (cm)  0.1 cm 10/09/20 1430   Post-Procedure Surface Area (cm^2) 56.35 cm^2 10/09/20 1430   Post-Procedure Volume (cm^3) 5.64 cm^3 10/09/20 1430   Wound Healing % 91 10/09/20 1430   Wound Bed Granulation (%) 20 % 09/30/20 1400   Wound Bed Epithelium (%) 0 % 07/29/20 1450   Wound Bed Slough (%) 80 % 09/30/20 1400   Wound Bed Eschar (%) 0 % 07/29/20 1450   Wound Bed Granulation (%) - Post-Procedure 100 % 09/30/20 1400   Wound Bed Slough % - (Post-Procedure) 5 % 06/05/20 0930   Tunneling (cm) 0 cm 10/09/20 1430   Undermining (cm) 0 cm 10/09/20 1430   Wound Odor None 10/09/20 1430   Pulses Left;2+;DP 06/05/20 0930   Exposed Structures None 10/09/20 1430       Wound 06/05/20 Full Thickness Wound Ankle LLE distal posteriomedial (Active)   Wound Image    10/09/20 1430   Site Assessment Red;Yellow 10/09/20 1430   Periwound Assessment Edema;Maceration 10/09/20 1430   Margins Attached edges 10/09/20 1430   Closure Secondary intention 09/30/20 1400   Drainage Amount Large 10/09/20 1430   Drainage Description Yellow;Green 10/09/20 1430   Treatments Cleansed;Topical Lidocaine;Provider debridement 10/09/20 1430   Wound Cleansing Dakin's Solution 10/09/20 1430   Periwound Protectant Barrier Paste 10/09/20 1430   Dressing Cleansing/Solutions 1/4 Strength Dakin's Solution 10/09/20 1430   Dressing Options Dry Gauze;Absorbent Abdominal Pad;Dry Roll Gauze;Hypafix Tape;Tubigrip 10/09/20 1430   Dressing Options (Historical)  Hydrofera Blue Ready;Absorbent Abdominal Pad;Compression Wrap Two Layer 07/15/20 1430   Dressing Changed New 08/26/20 1300   Dressing Status Clean;Dry;Intact 08/26/20 1300   Dressing Change/Treatment Frequency Monday, Wednesday, Friday, and As Needed 07/15/20 1430   Non-staged Wound Description Full thickness 10/09/20 1430   Wound Length (cm) 2.5 cm 10/09/20 1430   Wound Width (cm) 4 cm 10/09/20 1430   Wound Depth (cm) 0.3 cm 10/09/20 1430   Wound Surface Area (cm^2) 10 cm^2 10/09/20 1430   Wound Volume (cm^3) 3 cm^3  10/09/20 1430   Post-Procedure Length (cm) 2 cm 10/09/20 1430   Post-Procedure Width (cm) 4.5 cm 10/09/20 1430   Post-Procedure Depth (cm) 0.5 cm 10/09/20 1430   Post-Procedure Surface Area (cm^2) 9 cm^2 10/09/20 1430   Post-Procedure Volume (cm^3) 4.5 cm^3 10/09/20 1430   Wound Healing % 49 10/09/20 1430   Wound Bed Granulation (%) 10 % 09/30/20 1400   Wound Bed Epithelium (%) 0 % 07/29/20 1450   Wound Bed Slough (%) 90 % 09/30/20 1400   Wound Bed Eschar (%) 0 % 07/29/20 1450   Wound Bed Granulation (%) - Post-Procedure 100 % 09/30/20 1400   Wound Bed Slough % - (Post-Procedure) 20 % 06/12/20 0945   Tunneling (cm) 0 cm 10/09/20 1430   Undermining (cm) 0 cm 10/09/20 1430   Wound Odor None 10/09/20 1430   Pulses Left;2+;DP 06/05/20 0930   Exposed Structures None 10/09/20 1430       Wound 06/05/20 Full Thickness Wound Leg Left LLE posteromedial proximal (Active)   Wound Image    10/09/20 1430   Site Assessment Red;Yellow 10/09/20 1430   Periwound Assessment Maceration;Edema 10/09/20 1430   Margins Attached edges 10/09/20 1430   Closure Secondary intention 09/30/20 1400   Drainage Amount Large 10/09/20 1430   Drainage Description Green;Yellow 10/09/20 1430   Treatments Cleansed;Topical Lidocaine;Provider debridement 10/09/20 1430   Wound Cleansing Dakin's Solution 10/09/20 1430   Periwound Protectant Barrier Paste 10/09/20 1430   Dressing Cleansing/Solutions 1/4 Strength Dakin's Solution 10/09/20 1430   Dressing Options Dry Gauze;Absorbent Abdominal Pad;Dry Roll Gauze;Hypafix Tape;Tubigrip 10/09/20 1430   Dressing Options (Historical)  Hydrofera Blue Ready;Absorbent Abdominal Pad;Compression Wrap Two Layer 07/15/20 1430   Dressing Changed Changed 09/30/20 1400   Dressing Status Clean;Dry;Intact 09/30/20 1400   Dressing Change/Treatment Frequency Monday, Wednesday, Friday, and As Needed 08/26/20 1300   Non-staged Wound Description Full thickness 10/09/20 1430   Wound Length (cm) 8.5 cm 10/09/20 1430   Wound Width (cm) 8  cm 10/09/20 1430   Wound Depth (cm) 0.4 cm 10/09/20 1430   Wound Surface Area (cm^2) 68 cm^2 10/09/20 1430   Wound Volume (cm^3) 27.2 cm^3 10/09/20 1430   Post-Procedure Length (cm) 8.5 cm 10/09/20 143   Post-Procedure Width (cm) 9 cm 10/09/20 143   Post-Procedure Depth (cm) 0.6 cm 10/09/20 1430   Post-Procedure Surface Area (cm^2) 76.5 cm^2 10/09/20 1430   Post-Procedure Volume (cm^3) 45.9 cm^3 10/09/20 1430   Wound Healing % -260 10/09/20 143   Wound Bed Granulation (%) 10 % 20 1400   Wound Bed Epithelium (%) 0 % 20 145   Wound Bed Slough (%) 90 % 20 1400   Wound Bed Eschar (%) 0 % 20 1450   Wound Bed Granulation (%) - Post-Procedure 100 % 20 1400   Wound Bed Slough % - (Post-Procedure) 20 % 20 0945   Tunneling (cm) 0 cm 10/09/20 1430   Undermining (cm) 0 cm 10/09/20 143   Wound Odor None 10/09/20 1430   Pulses 2+;Left;DP 20 0930   Exposed Structures None 10/09/20 143                           PROCEDURE: Excisional debridement of right and left lower extremity wounds   -2% viscous lidocaine applied topically to wound beds for approximately 10 minutes prior to debridement  -Curette used to excise thick layer of slough from wound beds.  Excisional debridement was performed to remove devitalized tissue from all other wounds until healthy, bleeding tissue was visualized.  Total area debrided 140 cm² tissue debrided into the subcutaneous layer of all wounds.    -Bleeding controlled with manual pressure.    -Wounds cleansed with normal saline  -Patient tolerated the procedure well, without c/o pain or discomfort.         Pertinent Labs and Diagnostics:    Labs:     A1c:   Lab Results   Component Value Date/Time    HBA1C 5.7 (H) 2020 11:22 AM      Imagin/3/2020-MRI left tibia-fibula with and without contrast  IMPRESSION:     Moderate lateral leg cellulitis and underlying myositis     No abscess or osteomyelitis    VASCULAR STUDIES: BHUPINDER 2019   Right.     Doppler waveform of the common femoral artery is of high amplitude and    triphasic.    Doppler waveforms at the ankle are brisk and triphasic.    Ankle-brachial index is normal.       Left.    Could not perform ankle-brachial index due to large blisters/ulcers.   Normal toe-brachial index: 0.94   Doppler waveform of the common femoral artery is of high amplitude and    triphasic.    Biphasic waveforms seen at the ankle.     WOUND CULTURE:    DATE: 6/19/2020-culture collected in clinic  Culture Result  Abnormal     Beta Hemolytic Streptococcus group A   Heavy growth     Culture Result  Abnormal     Staphylococcus aureus   Moderate growth     Culture Result  Abnormal     Serratia marcescens   Moderate growth     Culture Result  Abnormal     Proteus mirabilis   Moderate growth     Culture Result  Abnormal     Pseudomonas species   Light growth   unable to isolate for sensitivity            ASSESSMENT AND PLAN:     1. Skin ulcer of left lower leg with fat layer exposed (HCC)  Comment: Patient has history of recurring ulcerations to left lower extremity.  Has undergone ablation procedures by Dr. White.  Discharged from [facility] [place] in March 2020 due to healing, returned approximately 3 weeks later much worse.  Hospitalized from 4/16 until 5/21/2020 underwent multiple surgical debridements.    10/9/2020: Thick layer of slough buildup on all wounds with fluorescent green/yellow drainage, severely macerated periwound. Foul smelling odor   -Excisional debridement of wounds in clinic today, medically necessary to promote wound healing.  -Patient to return to clinic weekly for assessment and debridement  -Home health to change dressing 1-2 times per week in between clinic visits  -pt/family to change dressing BID Increased frequency of dressing change to manage drainage. dakins to topically treat infection    Wound care: start dakin's moistened dressing BID with tubigrip for compression.  Reconsider resuming aq ag and 2 layer  compression wrap in 1 wk.       2. Skin ulcer of multiple sites of right lower extremity with fat layer exposed (HCC)  Comments: First observed in clinic on 6/5/2020, on initial evaluation. Patient is not certain when or how they occurred.  There is no documentation of wounds to this leg during recent hospitalization, so presumably they occurred after he went home.    10/9/2020: remain resolved     Wound care: Open to air.  Tubigrip to manage edema      3. Chronic venous stasis  Comments: Hemosiderin staining, brawny edema, scarring- findings consistent with CVI.  Pt established at [facility], underwent ablation procedures in 2019.     -Consider referral back to vascular if wounds failed to progress or deteriorate.    4. Acquired deformity of left ankle and foot  Comments: Charcot deformity of foot and ankle.  Very little ROM of ankle.  Patient was referred to orthopedics previously, however did not make appointment.    -Patient will need to follow-up with orthopedics once wounds are healed.    5.  Wound infection     10/9/2020: pt presents with signs of infection today. Foul Odor, heavy green drainage  Previously completed course of augmentin and cipro. 9/26/20  Take wound cx, follow results  Start cipro and augmentin for 7 days  Obtain ekg- phone number given to pt to schedule asap    6. Hypertension:     10/9/2020: blood pressure controlled at today's visit.   Check EKG, pt placed on cipro. eval QTc      PATIENT EDUCATION  - Pt advised to go to ER for any increased redness, swelling, drainage or odor, or if he develops fever, chills, nausea or vomiting.        Please note that this note may have been created using voice recognition software. I have worked with technical experts from [employer] to optimize the interface.  I have made every reasonable attempt to correct obvious errors, but there may be errors of grammar and possibly content that I did not discover before finalizing the note.    N

## 2020-10-09 NOTE — PATIENT INSTRUCTIONS
-Keep dressings clean, dry and covered while bathing. Only change dressings if they become over saturated, soiled or fall off.     -Avoid prolonged standing or sitting without elevating your legs.    -Remove your compression garments if you have severe pain, severe swelling, numbness, color change, or temperature change in your toes. If you need to remove your compression garments, do so by unrolling them. Do not cut the compression garments off, this is to prevent cutting yourself on accident.    -Should you experience any significant changes in your wound(s), such as infection (redness, swelling, localized heat, increased pain, fever > 101 F, chills) or have any questions regarding your home care instructions, please contact the wound center at [phone]. If after hours, contact your primary care physician or go to the hospital emergency room.

## 2020-10-11 LAB
GRAM STN SPEC: NORMAL
SIGNIFICANT IND 70042: NORMAL
SITE SITE: NORMAL
SOURCE SOURCE: NORMAL

## 2020-10-13 ENCOUNTER — DOCUMENTATION (OUTPATIENT)
Dept: ORTHOPEDICS | Facility: MEDICAL CENTER | Age: 65
End: 2020-10-13

## 2020-10-13 ENCOUNTER — HOSPITAL ENCOUNTER (OUTPATIENT)
Dept: CARDIOLOGY | Facility: MEDICAL CENTER | Age: 65
End: 2020-10-13
Attending: NURSE PRACTITIONER
Payer: MEDICARE

## 2020-10-13 DIAGNOSIS — Z01.810 PRE-OPERATIVE CARDIOVASCULAR EXAMINATION: ICD-10-CM

## 2020-10-13 LAB
BACTERIA WND AEROBE CULT: ABNORMAL
EKG IMPRESSION: NORMAL
GRAM STN SPEC: ABNORMAL
SIGNIFICANT IND 70042: ABNORMAL
SITE SITE: ABNORMAL
SOURCE SOURCE: ABNORMAL

## 2020-10-13 PROCEDURE — 93010 ELECTROCARDIOGRAM REPORT: CPT | Performed by: INTERNAL MEDICINE

## 2020-10-13 PROCEDURE — 93005 ELECTROCARDIOGRAM TRACING: CPT | Performed by: NURSE PRACTITIONER

## 2020-10-13 NOTE — PROGRESS NOTES
Received wound cultures from 10/9/2020.  Pseudomonas resistant to Cipro.  Prescription previously written on 10/9/2020 for Cipro and Augmentin for 7 days as well as EKG.  Called patient spoke with daughter.  Daughter reports that patient has not started the antibiotics as they picked up the medication 10/12/2020.  Daughter mentioned that they called over to wound care clinic on 10/12/2020 and was instructed to get EKG prior to starting the antibiotics.  Instructed daughter to have patient start Augmentin now, obtain EKG, and to not take Cipro.  Daughter verbalized understanding.  Patient has wound care follow-up appointment tomorrow with KEV Brantley.  Case discussed with Manuela ANGULO.  APRN will evaluate tomorrow, plan to treat patient topically for Pseudomonas and have patient follow-up with ID.

## 2020-10-13 NOTE — PROGRESS NOTES
Patient's culture came back positive for Pseudomonas and staph, aureus.  Patient was previously placed on Augmentin by Miguel ANGULO.  This will cover the staph.  Patient does not have a viable oral option for Pseudomonas.  Suspect that patient's wound is colonized.  We will attempt to dry out this wound with advanced dressings including Hydrofiber silver.  If there is no improvement we will have patient return to ID.

## 2020-10-14 ENCOUNTER — OFFICE VISIT (OUTPATIENT)
Dept: WOUND CARE | Facility: MEDICAL CENTER | Age: 65
End: 2020-10-14
Attending: NURSE PRACTITIONER
Payer: MEDICARE

## 2020-10-14 VITALS
DIASTOLIC BLOOD PRESSURE: 74 MMHG | RESPIRATION RATE: 16 BRPM | TEMPERATURE: 97.6 F | HEART RATE: 77 BPM | OXYGEN SATURATION: 99 % | SYSTOLIC BLOOD PRESSURE: 117 MMHG

## 2020-10-14 DIAGNOSIS — T14.8XXA WOUND INFECTION: ICD-10-CM

## 2020-10-14 DIAGNOSIS — L97.922 SKIN ULCER OF LEFT LOWER LEG WITH FAT LAYER EXPOSED (HCC): ICD-10-CM

## 2020-10-14 DIAGNOSIS — L08.9 WOUND INFECTION: ICD-10-CM

## 2020-10-14 DIAGNOSIS — L97.929 VENOUS STASIS ULCER OF LEFT LOWER EXTREMITY (HCC): ICD-10-CM

## 2020-10-14 DIAGNOSIS — I83.029 VENOUS STASIS ULCER OF LEFT LOWER EXTREMITY (HCC): ICD-10-CM

## 2020-10-14 DIAGNOSIS — I87.8 CHRONIC VENOUS STASIS: ICD-10-CM

## 2020-10-14 DIAGNOSIS — I10 ESSENTIAL HYPERTENSION: ICD-10-CM

## 2020-10-14 DIAGNOSIS — M21.962 ACQUIRED DEFORMITY OF LEFT ANKLE AND FOOT: ICD-10-CM

## 2020-10-14 PROCEDURE — 11042 DBRDMT SUBQ TIS 1ST 20SQCM/<: CPT | Performed by: NURSE PRACTITIONER

## 2020-10-14 PROCEDURE — 11042 DBRDMT SUBQ TIS 1ST 20SQCM/<: CPT

## 2020-10-14 PROCEDURE — 99213 OFFICE O/P EST LOW 20 MIN: CPT | Mod: 25 | Performed by: NURSE PRACTITIONER

## 2020-10-14 PROCEDURE — 11045 DBRDMT SUBQ TISS EACH ADDL: CPT | Performed by: NURSE PRACTITIONER

## 2020-10-14 PROCEDURE — 99213 OFFICE O/P EST LOW 20 MIN: CPT | Mod: 25

## 2020-10-14 PROCEDURE — 11045 DBRDMT SUBQ TISS EACH ADDL: CPT

## 2020-10-14 ASSESSMENT — ENCOUNTER SYMPTOMS
CHILLS: 0
MYALGIAS: 0
DIARRHEA: 0
SHORTNESS OF BREATH: 0
CONSTIPATION: 0
NERVOUS/ANXIOUS: 0
VOMITING: 0
CLAUDICATION: 0
NAUSEA: 0
COUGH: 0
FEVER: 0
DEPRESSION: 1

## 2020-10-14 NOTE — PROGRESS NOTES
Home health orders faxed to [facility] #[phone]

## 2020-10-14 NOTE — PROGRESS NOTES
Provider Encounter- Full Thickness wound    HISTORY OF PRESENT ILLNESS  Wound History:    START OF CARE IN CLINIC: 5/20/2020               REFERRING PROVIDER: KEV Keith                 WOUND ETIOLOGY: venous,  likely mixed etiology              LOCATION: Left lower leg, multiple wounds                                   Right lower leg, multiple wounds-first observed in clinic on initial evaluation, 6/5/2020.  Resolved 9/2/2020                HISTORY: Patient is very well-known to this clinic from previous treatment of wounds to his left lower extremity.  He was discharged from the clinic in March 2020 due to full resolution of his wound.  He returned to the clinic approximately 3 weeks later, on 4/16, with cellulitis, edema, and large open wounds to his left lower leg, and was sent directly to [facility] emergency room.  He was admitted for IV antibiotics and surgical intervention.  On 4/22/2020, he underwent an I&D of his left lower extremity, fasciotomy, and VAC placement.  In the following weeks he underwent surgery 3 more times for irrigation and debridement.  He was discharged home on 5/21, on the VAC, with home health and a referral to [facility].       Pertinent Medical History:  Anxiety, Charcot's joint of left foot, non-diabetic (3/21/2016), Chronic congestive heart failure (HCC) (11/16/2017), Hepatitis C, chronic (HCC), Hypertension, Migraine, Polysubstance abuse (HCC) (3/8/2018), Tobacco use (4/18/2016), Ulcer of left lower extremity with necrosis of muscle (HCC) (3/21/2016), and Venous stasis ulcer ([facility]) (2017).                ETIOLOGY HISTORY:  Vascular Surgeon: Dr. White. Compression Circ-aid. Varicose Veins none visible     TOBACCO USE: Patient denies; patient also denies alcohol and recreational drug use    Patient's problem list, allergies, and current medications reviewed and updated in Epic    Interval History:  6/5/2020: Initial clinic visit with KEV Brantley.  Patient  "returns to clinic after a long hospitalization.  The VAC was held by [facility] due to severe maceration and deterioration of wound beds.  He also presents today with new wounds to his right lower extremity.  He states he does not know how or when these occurred, states, \"they just popped up\".  He is uncertain whether or not he has follow-up appointments with his surgeon.  He does not know what medications he is on.      6/12/2020 : Clinic visit with KEV Brantley.  Goran states he is feeling well today,denies fevers, chills, nausea, vomiting, cough or shortness of breath.  Condition of his periwound skin is significantly improved.  I would like to restart VAC, however he did not bring the VAC with him, and that has already been on for 2-1/2 weeks.  Will likely need to be reordered.  [facility] continues to see him several times per week for dressing changes.  He is tolerating compression without any difficulty.    6/19/2020 : Clinic visit with KEV Brantley.   Goran states he is feeling well today,denies fevers, chills, nausea, vomiting, cough or shortness of breath.  He brought his VAC with him today.  However,  too much slough on wound beds to consider re-applying.  [facility] continues to see him 2 times per week.  I have asked him to bring the VAC with him again next week, and will hopefully place it at that time.  Per patient, dressings are saturated between dressing changes.    6/26/2020 : Clinic visit with KEV Brantley.   Goran states he is feeling well today,denies fevers, chills, nausea, vomiting, cough or shortness of breath.  He brought his VAC with him again today, however we are not able to put it on as he is still having twice drainage.  Sensitivities from culture still pending.  However I did start him on Augmentin today which we are able to obtain from [facility] prior to his leaving the clinic today and had him take one before he left.  [facility] is been seen 2 times per week " in between clinic visits.  We will try and get them to see him over the weekend to do an additional dressing change.  Will adjust antibiotics once sensitivities are known.  Hopefully start VAC next clinic visit.    7/2/2020 : Clinic visit with KEV Brantley.    Goran states he is feeling well today,denies fevers, chills, nausea, vomiting, cough or shortness of breath.  His VAC is here in the clinic, however he does not have another home health appointment until Monday, 4 days from now.  He has been taking Augmentin as prescribed last week.  I informed him that I added Bactrim DS after I got sensitivity results, and that it should be waiting for him at his pharmacy.    7/9/2020: Clinic visit with KEV Luna. Patient states that they are feeling well today.  Patient denies fever, chills, nausea, vomiting, lightheadedness, dizziness, shortness of breath and chest pain.  All wound beds debrided in clinic today patient's wounds continue to progress beefy red granulation tissue to left lower extremity lateral wound tendon is being covered with granulation tissue continue with wound VAC and current plan of care.    7/15/2020 : Clinic visit with KEV Brantley.  Goran states he is feeling well today,denies fevers, chills, nausea, vomiting, cough or shortness of breath.  Home health continues to see him in between clinic visits.  He states he has not been smoking, drinking or using any street drugs.    7/22/2020 : Clinic visit with KEV Brantley.   Goran states he is feeling well today,denies fevers, chills, nausea, vomiting, cough or shortness of breath.  Home health continues to see him in between clinic visits.    7/29/2020: Clinic visit with KEV Luna. Patient states that they are feeling well today.  Patient denies fever, chills, nausea, vomiting, lightheadedness, dizziness, shortness of breath and chest pain.    8/5/2020: Clinic visit with KEV Luna. Patient  states that they are feeling well today.  Patient denies fever, chills, nausea, vomiting, lightheadedness, dizziness, shortness of breath and chest pain.  Patient's wounds are foul-smelling today.  Patient had positive culture we have already ordered the patient Cipro and Augmentin patient reports that he got my message from last night.  Patient reports that he is can  antibiotics tonight in the ED initiate them.  Patient has been referred to infectious disease due to multiple bacterial growth and for their expert opinion.    8/20/2020 : Clinic visit with KEV Brantley.  Goran states he is feeling well, denies fevers, chills, nausea, vomiting, cough or shortness of breath.  He states he has not yet heard from ID.  However, review of referral note indicates that a message was left on his phone earlier today.  Kodi states he will check his message and will call them back.  He completed his antibiotics 2 days ago.    9/2/2020 : Clinic visit with KEV Brantley.   Goran states he is feeling well, denies fevers, chills, nausea, vomiting, cough or shortness of breath.  He was seen by ID yesterday, no new antibiotics ordered, however MRI ordered to assess for OM.  Goran has an appoint with his PCP tomorrow.    9/9/2020 : Clinic visit with KEV Brantley.  Goran states he is feeling well, denies fevers, chills, nausea, vomiting, cough or shortness of breath.  Reviewed MRI results with him in clinic today, no evidence of OM.    9/16/2020 : Clinic visit with KEV Brantley.  Goran presents today looking more disheveled than usual, he wore sunglasses throughout the visit.  His speech is a bit pressured and disorganized.  He denies using any street drugs or drinking alcohol.  His dressings are saturated, and his wounds have deteriorated significantly.  He denies fevers, chills, nausea, vomiting, cough or shortness of breath.  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  Home health continues to see him in between clinic visits for dressing changes.    9/30/2020 : Clinic visit with KEV Brantley.  Goran states he is feeling well today, denies fevers, chills, nausea, vomiting, cough or shortness of breath.  He missed his appointment last week, arrived too late and could not be seen.  He is again wearing his sunglasses throughout today's visit.  He denies using any street drugs or drinking alcohol.  Home health continues to see him in between clinic visits.    10/9/2020: Clinic visit with Abeba ANGULO. Pt denies fevers, chills, nausea, vomiting. Followed by HH. Presents with foul smelling fluorescent green/yellow drainage from wounds and on dressings.     10/14/2020: Clinic visit with KEV Brantley. Goran states he is feeling well today, denies fevers, chills, nausea, vomiting, cough or shortness of breath.  Drainage has tapered off slightly mild odor noted.      REVIEW OF SYSTEMS:   Review of Systems   Constitutional: Negative for chills and fever.        States he is eating well, appetite normal   Respiratory: Negative for cough and shortness of breath.    Cardiovascular: Positive for leg swelling. Negative for chest pain and claudication.        Chronic swelling in legs, for several years   Gastrointestinal: Negative for constipation, diarrhea, nausea and vomiting.   Genitourinary: Negative for dysuria.   Musculoskeletal: Positive for joint pain. Negative for myalgias.        Chronic   Psychiatric/Behavioral: Positive for depression. The patient is not nervous/anxious.        PHYSICAL EXAMINATION:   /74   Pulse 77   Temp 36.4 °C (97.6 °F) (Temporal)   Resp 16   SpO2 99%     Physical Exam   Constitutional: He is oriented to person, place, and time and well-developed, well-nourished, and in no distress.   Disheveled unkept   HENT:   Head: Normocephalic and " atraumatic.   Eyes: Pupils are equal, round, and reactive to light. Conjunctivae and EOM are normal.   Neck: Neck supple.   Cardiovascular: Intact distal pulses.   Pulmonary/Chest: Effort normal.   Musculoskeletal: Normal range of motion.         General: Deformity and edema present.      Comments: Left charcot foot and ankle  1+pitting edema to bilateral LE   Neurological: He is alert and oriented to person, place, and time.   Skin: Skin is warm. There is erythema.   Large full-thickness wounds to bilateral lower extremities  Refer to wound flowsheet and photos  Odor  Green drainage    Psychiatric: Affect normal.   Speech somewhat pressured and disorganized       WOUND ASSESSMENT              Wound 06/05/20 Full Thickness Wound Leg Left LLE anterolateral (Active)   Wound Image    10/14/20 1355   Site Assessment Red;Yellow 10/14/20 1355   Periwound Assessment Dry;Edema 10/14/20 1355   Margins Attached edges 10/14/20 1355   Closure Secondary intention 09/30/20 1400   Drainage Amount Large 10/14/20 1355   Drainage Description Yellow;Serous 10/14/20 1355   Treatments Cleansed;Topical Lidocaine;Provider debridement 10/14/20 1355   Wound Cleansing Dakin's Solution 10/14/20 1355   Periwound Protectant Barrier Paste 10/14/20 1355   Dressing Cleansing/Solutions 1/4 Strength Dakin's Solution 10/14/20 1355   Dressing Options Dry Gauze 10/14/20 1355   Dressing Options (Historical)  Wound Vac;Compression Wrap Two Layer 07/15/20 1430   Dressing Changed Changed 09/30/20 1400   Dressing Status Clean;Dry;Intact 09/30/20 1400   Dressing Change/Treatment Frequency Monday, Wednesday, Friday, and As Needed 09/30/20 1400   Non-staged Wound Description Full thickness 10/14/20 1355   Wound Length (cm) 10.4 cm 10/14/20 1355   Wound Width (cm) 4.2 cm 10/14/20 1355   Wound Depth (cm) 0.1 cm 10/14/20 1355   Wound Surface Area (cm^2) 43.68 cm^2 10/14/20 1355   Wound Volume (cm^3) 4.37 cm^3 10/14/20 1355   Post-Procedure Length (cm) 10.4 cm  10/14/20 1355   Post-Procedure Width (cm) 4.3 cm 10/14/20 1355   Post-Procedure Depth (cm) 0.1 cm 10/14/20 1355   Post-Procedure Surface Area (cm^2) 44.72 cm^2 10/14/20 1355   Post-Procedure Volume (cm^3) 4.47 cm^3 10/14/20 1355   Wound Healing % 92 10/14/20 1355   Wound Bed Granulation (%) 20 % 09/30/20 1400   Wound Bed Epithelium (%) 0 % 07/29/20 1450   Wound Bed Slough (%) 80 % 09/30/20 1400   Wound Bed Eschar (%) 0 % 07/29/20 1450   Wound Bed Granulation (%) - Post-Procedure 100 % 09/30/20 1400   Wound Bed Slough % - (Post-Procedure) 5 % 06/05/20 0930   Tunneling (cm) 0 cm 10/14/20 1355   Undermining (cm) 0 cm 10/14/20 1355   Wound Odor None 10/14/20 1355   Pulses Left;2+;DP 06/05/20 0930   Exposed Structures None 10/14/20 1355       Wound 06/05/20 Full Thickness Wound Ankle LLE distal posteriomedial (Active)   Wound Image    10/14/20 1355   Site Assessment Red;Yellow 10/14/20 1355   Periwound Assessment Dry;Edema 10/14/20 1355   Margins Attached edges 10/14/20 1355   Closure Secondary intention 09/30/20 1400   Drainage Amount Large 10/14/20 1355   Drainage Description Yellow;Serous 10/14/20 1355   Treatments Cleansed;Topical Lidocaine;Provider debridement 10/14/20 1355   Wound Cleansing Dakin's Solution 10/14/20 1355   Periwound Protectant Barrier Paste 10/14/20 1355   Dressing Cleansing/Solutions 1/4 Strength Dakin's Solution 10/14/20 1355   Dressing Options Dry Gauze;Absorbent Abdominal Pad;Dry Roll Gauze;Hypafix Tape;Tubigrip 10/14/20 1355   Dressing Options (Historical)  Hydrofera Blue Ready;Absorbent Abdominal Pad;Compression Wrap Two Layer 07/15/20 1430   Dressing Changed New 08/26/20 1300   Dressing Status Clean;Dry;Intact 08/26/20 1300   Dressing Change/Treatment Frequency Monday, Wednesday, Friday, and As Needed 07/15/20 1430   Non-staged Wound Description Full thickness 10/14/20 1355   Wound Length (cm) 1.6 cm 10/14/20 1355   Wound Width (cm) 4.7 cm 10/14/20 1355   Wound Depth (cm) 0.2 cm 10/14/20  1355   Wound Surface Area (cm^2) 7.52 cm^2 10/14/20 1355   Wound Volume (cm^3) 1.5 cm^3 10/14/20 1355   Post-Procedure Length (cm) 1.6 cm 10/14/20 1355   Post-Procedure Width (cm) 5 cm 10/14/20 1355   Post-Procedure Depth (cm) 0.2 cm 10/14/20 1355   Post-Procedure Surface Area (cm^2) 8 cm^2 10/14/20 1355   Post-Procedure Volume (cm^3) 1.6 cm^3 10/14/20 1355   Wound Healing % 74 10/14/20 1355   Wound Bed Granulation (%) 10 % 09/30/20 1400   Wound Bed Epithelium (%) 0 % 07/29/20 1450   Wound Bed Slough (%) 90 % 09/30/20 1400   Wound Bed Eschar (%) 0 % 07/29/20 1450   Wound Bed Granulation (%) - Post-Procedure 100 % 09/30/20 1400   Wound Bed Slough % - (Post-Procedure) 20 % 06/12/20 0945   Tunneling (cm) 0 cm 10/14/20 1355   Undermining (cm) 0 cm 10/14/20 1355   Wound Odor None 10/14/20 1355   Pulses Left;2+;DP 06/05/20 0930   Exposed Structures None 10/14/20 1355       Wound 06/05/20 Full Thickness Wound Leg Left LLE posteromedial proximal (Active)   Wound Image    10/14/20 1355   Site Assessment Red;Yellow 10/14/20 1355   Periwound Assessment Dry;Edema 10/14/20 1355   Margins Attached edges 10/14/20 1355   Closure Secondary intention 09/30/20 1400   Drainage Amount Large 10/14/20 1355   Drainage Description Yellow;Serous 10/14/20 1355   Treatments Cleansed;Topical Lidocaine;Provider debridement 10/14/20 1355   Wound Cleansing Dakin's Solution 10/14/20 1355   Periwound Protectant Barrier Paste 10/14/20 1355   Dressing Cleansing/Solutions 1/4 Strength Dakin's Solution 10/14/20 1355   Dressing Options Dry Gauze;Absorbent Abdominal Pad;Dry Roll Gauze;Hypafix Tape;Tubigrip 10/14/20 1355   Dressing Options (Historical)  Hydrofera Blue Ready;Absorbent Abdominal Pad;Compression Wrap Two Layer 07/15/20 1430   Dressing Changed Changed 09/30/20 1400   Dressing Status Clean;Dry;Intact 09/30/20 1400   Dressing Change/Treatment Frequency Monday, Wednesday, Friday, and As Needed 08/26/20 1300   Non-staged Wound Description Full  thickness 10/14/20 1355   Wound Length (cm) 6.2 cm 10/14/20 1355   Wound Width (cm) 9 cm 10/14/20 1355   Wound Depth (cm) 0.2 cm 10/14/20 1355   Wound Surface Area (cm^2) 55.8 cm^2 10/14/20 1355   Wound Volume (cm^3) 11.16 cm^3 10/14/20 1355   Post-Procedure Length (cm) 6.5 cm 10/14/20 1355   Post-Procedure Width (cm) 9 cm 10/14/20 1355   Post-Procedure Depth (cm) 0.4 cm 10/14/20 1355   Post-Procedure Surface Area (cm^2) 58.5 cm^2 10/14/20 1355   Post-Procedure Volume (cm^3) 23.4 cm^3 10/14/20 1355   Wound Healing % -48 10/14/20 1355   Wound Bed Granulation (%) 10 % 20 1400   Wound Bed Epithelium (%) 0 % 20   Wound Bed Slough (%) 90 % 20 1400   Wound Bed Eschar (%) 0 % 20   Wound Bed Granulation (%) - Post-Procedure 100 % 20 1400   Wound Bed Slough % - (Post-Procedure) 20 % 20 0945   Tunneling (cm) 0 cm 10/14/20 1355   Undermining (cm) 0 cm 10/14/20 1355   Wound Odor None 10/14/20 1355   Pulses 2+;Left;DP 20 0930   Exposed Structures None 10/14/20 1355         PROCEDURE: Excisional debridement of left lower extremity wounds   -2% viscous lidocaine applied topically to wound beds for approximately 10 minutes prior to debridement  -Curette used to excise thick layer of slough from wound beds.  Excisional debridement was performed to remove devitalized tissue from all other wounds until healthy, bleeding tissue was visualized.  Total area debrided 111.22 cm² tissue debrided into the subcutaneous layer of all wounds.    -Bleeding controlled with manual pressure.    -Wounds cleansed with normal saline  -Patient tolerated the procedure well, without c/o pain or discomfort.         Pertinent Labs and Diagnostics:    Labs:     A1c:   Lab Results   Component Value Date/Time    HBA1C 5.7 (H) 2020 11:22 AM      Imagin/3/2020-MRI left tibia-fibula with and without contrast  IMPRESSION:     Moderate lateral leg cellulitis and underlying myositis     No abscess or  osteomyelitis    VASCULAR STUDIES: BHUPINDER 12/27/2019   Right.    Doppler waveform of the common femoral artery is of high amplitude and    triphasic.    Doppler waveforms at the ankle are brisk and triphasic.    Ankle-brachial index is normal.       Left.    Could not perform ankle-brachial index due to large blisters/ulcers.   Normal toe-brachial index: 0.94   Doppler waveform of the common femoral artery is of high amplitude and    triphasic.    Biphasic waveforms seen at the ankle.     WOUND CULTURE:    DATE: 6/19/2020-culture collected in clinic  Culture Result  Abnormal     Beta Hemolytic Streptococcus group A   Heavy growth     Culture Result  Abnormal     Staphylococcus aureus   Moderate growth     Culture Result  Abnormal     Serratia marcescens   Moderate growth     Culture Result  Abnormal     Proteus mirabilis   Moderate growth     Culture Result  Abnormal     Pseudomonas species   Light growth   unable to isolate for sensitivity            ASSESSMENT AND PLAN:     1. Skin ulcer of left lower leg with fat layer exposed (HCC)  Comment: Patient has history of recurring ulcerations to left lower extremity.  Has undergone ablation procedures by Dr. White.  Discharged from [facility] in March 2020 due to healing, returned approximately 3 weeks later much worse.  Hospitalized from 4/16 until 5/21/2020 underwent multiple surgical debridements.    10/14/2020: Drainage has decreased significantly since last assessment.  No wound odor noted.  -Excisional debridement of wounds in clinic today, medically necessary to promote wound healing.  -Patient to return to clinic weekly for assessment and debridement  -Home health to change dressing 1-2 times per week in between clinic visits  -pt/family to change dressing daily using quarter strength Dakin's in between home health wound clinic visits.  We will continue with this for 1 more week, then switch back to advanced wound care nursing.    Wound care: Quarter strength Dakin's  moistened dressing  With tubigrip for compression.  Reconsider resuming aq ag and 2 layer compression wrap in 1 wk.       2. Skin ulcer of multiple sites of right lower extremity with fat layer exposed (HCC)  Comments: First observed in clinic on 6/5/2020, on initial evaluation. Patient is not certain when or how they occurred.  There is no documentation of wounds to this leg during recent hospitalization, so presumably they occurred after he went home.    10/14/2020: remains resolved     Wound care: Open to air.  Tubigrip to manage edema      3. Chronic venous stasis  Comments: Hemosiderin staining, brawny edema, scarring- findings consistent with CVI.  Pt established at [facility], underwent ablation procedures in 2019.     -Consider referral back to vascular if wounds failed to progress or deteriorate.    4. Acquired deformity of left ankle and foot  Comments: Charcot deformity of foot and ankle.  Very little ROM of ankle.  Patient was referred to orthopedics previously, however did not make appointment.    -Patient will need to follow-up with orthopedics once wounds are healed.    5.  Wound infection     10/14/2020: Less drainage and odor noted today.  -Patient advised to discontinue Cipro, culture resistant.  Pseudomonas to be treated topically  -Continue Augmentin      6. Hypertension:     10/14/2020: blood pressure controlled at today's visit, 117/74.  Patient completed EKG yesterday, normal sinus rhythm, borderline criteria for LVH.        PATIENT EDUCATION  - Pt advised to go to ER for any increased redness, swelling, drainage or odor, or if he develops fever, chills, nausea or vomiting.    Patient was seen for 15 minutes face to face of which > 50% of appointment time was spent on counseling and coordination of care regarding the above.    Please note that this note may have been created using voice recognition software. I have worked with technical experts from [employer] to optimize the interface.  I  have made every reasonable attempt to correct obvious errors, but there may be errors of grammar and possibly content that I did not discover before finalizing the note.    N

## 2020-10-14 NOTE — LETTER
October 14, 2020    To: [facility] (fax #[phone])    Re: Goran Chavez (Date of Birth 1955)    Wound Care Order:    Patient may shower with dressings off prior to dressing changes.    · Remove old dressings.  · Cleanse left leg wounds with Puracyn Spray. Pat dry.  · Apply zinc barrier cream to sandrine-wounds.  · Moisten gauze with 1/4 Strength Dakin's Solution or Puracyn Spray, then apply moistened gauze to wounds.  · Cover gauze with ABD pads.  · Secure ABD pads with roll gauze.  · Secure roll gauze with hypafix tape.  · Apply tubigrip D to right lower leg. Ensure that top of tubigrip is no more than two inches below the bend of the knee.  · Patient has been instructed to change his dressings daily.  · Home Health to see patient twice per week for dressing changes.            ________________________  KEV Mead

## 2020-10-21 ENCOUNTER — OFFICE VISIT (OUTPATIENT)
Dept: WOUND CARE | Facility: MEDICAL CENTER | Age: 65
End: 2020-10-21
Attending: NURSE PRACTITIONER
Payer: MEDICARE

## 2020-10-21 VITALS
DIASTOLIC BLOOD PRESSURE: 88 MMHG | HEART RATE: 67 BPM | OXYGEN SATURATION: 99 % | TEMPERATURE: 98.7 F | SYSTOLIC BLOOD PRESSURE: 140 MMHG | RESPIRATION RATE: 18 BRPM

## 2020-10-21 DIAGNOSIS — M21.962 ACQUIRED DEFORMITY OF LEFT ANKLE AND FOOT: ICD-10-CM

## 2020-10-21 DIAGNOSIS — L97.922 SKIN ULCER OF LEFT LOWER LEG WITH FAT LAYER EXPOSED (HCC): ICD-10-CM

## 2020-10-21 DIAGNOSIS — L97.929 VENOUS STASIS ULCER OF LEFT LOWER EXTREMITY (HCC): ICD-10-CM

## 2020-10-21 DIAGNOSIS — I87.8 CHRONIC VENOUS STASIS: ICD-10-CM

## 2020-10-21 DIAGNOSIS — I83.029 VENOUS STASIS ULCER OF LEFT LOWER EXTREMITY (HCC): ICD-10-CM

## 2020-10-21 DIAGNOSIS — L08.9 WOUND INFECTION: ICD-10-CM

## 2020-10-21 DIAGNOSIS — T14.8XXA WOUND INFECTION: ICD-10-CM

## 2020-10-21 PROCEDURE — 11045 DBRDMT SUBQ TISS EACH ADDL: CPT

## 2020-10-21 PROCEDURE — 11042 DBRDMT SUBQ TIS 1ST 20SQCM/<: CPT | Performed by: NURSE PRACTITIONER

## 2020-10-21 PROCEDURE — 11045 DBRDMT SUBQ TISS EACH ADDL: CPT | Performed by: NURSE PRACTITIONER

## 2020-10-21 PROCEDURE — 11042 DBRDMT SUBQ TIS 1ST 20SQCM/<: CPT

## 2020-10-21 ASSESSMENT — ENCOUNTER SYMPTOMS
MYALGIAS: 0
DEPRESSION: 1
CHILLS: 0
VOMITING: 0
COUGH: 0
CONSTIPATION: 0
FEVER: 0
DIARRHEA: 0
CLAUDICATION: 0
NERVOUS/ANXIOUS: 0
NAUSEA: 0
SHORTNESS OF BREATH: 0

## 2020-10-21 NOTE — LETTER
October 21, 2020    To: [facility] (fax #[phone])    Re: Goran Chavez (Date of Birth 1955)    Wound Care Order:    Patient may shower with dressings off prior to dressing changes.    · Remove old dressings.  · Cleanse patient's leg with warm washcloth and no rinse foam cleanser.   · Cleanse left leg wounds with Puracyn Spray. Pat dry.  · Apply zinc barrier cream to sandrine-wounds.  · Moisten gauze with 1/4 Strength Dakin's Solution or Puracyn Spray, then apply moistened gauze to wounds.  · Cover gauze with ABD pads.  · Secure ABD pads with roll gauze.  · Secure roll gauze with hypafix tape.  · Apply tubigrip D to right lower leg. Ensure that top of tubigrip is no more than two inches below the bend of the knee.  · Patient has been instructed to change his dressings daily.  · Home Health to see patient twice per week for dressing changes.            ________________________  KEV Mead

## 2020-10-21 NOTE — PROGRESS NOTES
Provider Encounter- Full Thickness wound    HISTORY OF PRESENT ILLNESS  Wound History:    START OF CARE IN CLINIC: 5/20/2020               REFERRING PROVIDER: KEV Keith                 WOUND ETIOLOGY: venous,  likely mixed etiology              LOCATION: Left lower leg, multiple wounds                                   Right lower leg, multiple wounds-first observed in clinic on initial evaluation, 6/5/2020.  Resolved 9/2/2020                HISTORY: Patient is very well-known to this clinic from previous treatment of wounds to his left lower extremity.  He was discharged from the clinic in March 2020 due to full resolution of his wound.  He returned to the clinic approximately 3 weeks later, on 4/16, with cellulitis, edema, and large open wounds to his left lower leg, and was sent directly to [facility] emergency room.  He was admitted for IV antibiotics and surgical intervention.  On 4/22/2020, he underwent an I&D of his left lower extremity, fasciotomy, and VAC placement.  In the following weeks he underwent surgery 3 more times for irrigation and debridement.  He was discharged home on 5/21, on the VAC, with home health and a referral to [facility].       Pertinent Medical History:  Anxiety, Charcot's joint of left foot, non-diabetic (3/21/2016), Chronic congestive heart failure (HCC) (11/16/2017), Hepatitis C, chronic (HCC), Hypertension, Migraine, Polysubstance abuse (HCC) (3/8/2018), Tobacco use (4/18/2016), Ulcer of left lower extremity with necrosis of muscle (HCC) (3/21/2016), and Venous stasis ulcer ([facility]) (2017).                ETIOLOGY HISTORY:  Vascular Surgeon: Dr. White. Compression Circ-aid. Varicose Veins none visible     TOBACCO USE: Patient denies; patient also denies alcohol and recreational drug use    Patient's problem list, allergies, and current medications reviewed and updated in Epic    Interval History:  6/5/2020: Initial clinic visit with KEV Brantley.  Patient  "returns to clinic after a long hospitalization.  The VAC was held by [facility] due to severe maceration and deterioration of wound beds.  He also presents today with new wounds to his right lower extremity.  He states he does not know how or when these occurred, states, \"they just popped up\".  He is uncertain whether or not he has follow-up appointments with his surgeon.  He does not know what medications he is on.      6/12/2020 : Clinic visit with KEV Brantley.  Goran states he is feeling well today,denies fevers, chills, nausea, vomiting, cough or shortness of breath.  Condition of his periwound skin is significantly improved.  I would like to restart VAC, however he did not bring the VAC with him, and that has already been on for 2-1/2 weeks.  Will likely need to be reordered.  [facility] continues to see him several times per week for dressing changes.  He is tolerating compression without any difficulty.    6/19/2020 : Clinic visit with KEV Brantley.   Goran states he is feeling well today,denies fevers, chills, nausea, vomiting, cough or shortness of breath.  He brought his VAC with him today.  However,  too much slough on wound beds to consider re-applying.  [facility] continues to see him 2 times per week.  I have asked him to bring the VAC with him again next week, and will hopefully place it at that time.  Per patient, dressings are saturated between dressing changes.    6/26/2020 : Clinic visit with KEV Brantley.   Goran states he is feeling well today,denies fevers, chills, nausea, vomiting, cough or shortness of breath.  He brought his VAC with him again today, however we are not able to put it on as he is still having twice drainage.  Sensitivities from culture still pending.  However I did start him on Augmentin today which we are able to obtain from [facility] prior to his leaving the clinic today and had him take one before he left.  [facility] is been seen 2 times per week " in between clinic visits.  We will try and get them to see him over the weekend to do an additional dressing change.  Will adjust antibiotics once sensitivities are known.  Hopefully start VAC next clinic visit.    7/2/2020 : Clinic visit with KEV Brantley.    Goran states he is feeling well today,denies fevers, chills, nausea, vomiting, cough or shortness of breath.  His VAC is here in the clinic, however he does not have another home health appointment until Monday, 4 days from now.  He has been taking Augmentin as prescribed last week.  I informed him that I added Bactrim DS after I got sensitivity results, and that it should be waiting for him at his pharmacy.    7/9/2020: Clinic visit with KEV Luna. Patient states that they are feeling well today.  Patient denies fever, chills, nausea, vomiting, lightheadedness, dizziness, shortness of breath and chest pain.  All wound beds debrided in clinic today patient's wounds continue to progress beefy red granulation tissue to left lower extremity lateral wound tendon is being covered with granulation tissue continue with wound VAC and current plan of care.    7/15/2020 : Clinic visit with KEV Brantley.  Goran states he is feeling well today,denies fevers, chills, nausea, vomiting, cough or shortness of breath.  Home health continues to see him in between clinic visits.  He states he has not been smoking, drinking or using any street drugs.    7/22/2020 : Clinic visit with KEV Brantley.   Goran states he is feeling well today,denies fevers, chills, nausea, vomiting, cough or shortness of breath.  Home health continues to see him in between clinic visits.    7/29/2020: Clinic visit with KEV Luna. Patient states that they are feeling well today.  Patient denies fever, chills, nausea, vomiting, lightheadedness, dizziness, shortness of breath and chest pain.    8/5/2020: Clinic visit with KEV Luna. Patient  states that they are feeling well today.  Patient denies fever, chills, nausea, vomiting, lightheadedness, dizziness, shortness of breath and chest pain.  Patient's wounds are foul-smelling today.  Patient had positive culture we have already ordered the patient Cipro and Augmentin patient reports that he got my message from last night.  Patient reports that he is can  antibiotics tonight in the ED initiate them.  Patient has been referred to infectious disease due to multiple bacterial growth and for their expert opinion.    8/20/2020 : Clinic visit with KEV Brantley.  Goran states he is feeling well, denies fevers, chills, nausea, vomiting, cough or shortness of breath.  He states he has not yet heard from ID.  However, review of referral note indicates that a message was left on his phone earlier today.  Kodi states he will check his message and will call them back.  He completed his antibiotics 2 days ago.    9/2/2020 : Clinic visit with KEV Brantley.   Goran states he is feeling well, denies fevers, chills, nausea, vomiting, cough or shortness of breath.  He was seen by ID yesterday, no new antibiotics ordered, however MRI ordered to assess for OM.  Goran has an appoint with his PCP tomorrow.    9/9/2020 : Clinic visit with KEV Brantley.  Goran states he is feeling well, denies fevers, chills, nausea, vomiting, cough or shortness of breath.  Reviewed MRI results with him in clinic today, no evidence of OM.    9/16/2020 : Clinic visit with KEV Brantley.  Goran presents today looking more disheveled than usual, he wore sunglasses throughout the visit.  His speech is a bit pressured and disorganized.  He denies using any street drugs or drinking alcohol.  His dressings are saturated, and his wounds have deteriorated significantly.  He denies fevers, chills, nausea, vomiting, cough or shortness of breath.  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  Home health continues to see him in between clinic visits for dressing changes.    9/30/2020 : Clinic visit with KEV Brantley.  Goran states he is feeling well today, denies fevers, chills, nausea, vomiting, cough or shortness of breath.  He missed his appointment last week, arrived too late and could not be seen.  He is again wearing his sunglasses throughout today's visit.  He denies using any street drugs or drinking alcohol.  Home health continues to see him in between clinic visits.    10/9/2020: Clinic visit with Abeba ANGULO. Pt denies fevers, chills, nausea, vomiting. Followed by HH. Presents with foul smelling fluorescent green/yellow drainage from wounds and on dressings.     10/21/2020 : Clinic visit with KEV Brantley.  Goran states he is feeling well today, denies fevers, chills, nausea, vomiting, cough or shortness of breath.  He completed his antibiotics a few days ago.  He has not been showering with his wound open, states his house has some, \"plumbing issues\".  Wound has been treated with daily Dakin's moistened gauze.  Patient has been able to change dressing in between clinic visits.      REVIEW OF SYSTEMS:   Review of Systems   Constitutional: Negative for chills and fever.        States he is eating well, appetite normal   Respiratory: Negative for cough and shortness of breath.    Cardiovascular: Positive for leg swelling. Negative for chest pain and claudication.        Chronic swelling in legs, for several years   Gastrointestinal: Negative for constipation, diarrhea, nausea and vomiting.   Genitourinary: Negative for dysuria.   Musculoskeletal: Positive for joint pain. Negative for myalgias.        Chronic   Psychiatric/Behavioral: Positive for depression. The patient is not nervous/anxious.        PHYSICAL EXAMINATION:   /88   Pulse 67   Temp 37.1 °C (98.7 °F) (Temporal)   " Resp 18   SpO2 99%     Physical Exam   Constitutional: He is oriented to person, place, and time and well-developed, well-nourished, and in no distress.   Disheveled unkept   HENT:   Head: Normocephalic and atraumatic.   Eyes: Pupils are equal, round, and reactive to light. Conjunctivae and EOM are normal.   Neck: Neck supple.   Cardiovascular: Intact distal pulses.   Pulmonary/Chest: Effort normal.   Musculoskeletal: Normal range of motion.         General: Deformity and edema present.      Comments: Left charcot foot and ankle  1+pitting edema to bilateral LE   Neurological: He is alert and oriented to person, place, and time.   Skin: Skin is warm. There is erythema.   Large full-thickness wounds to bilateral lower extremities  Refer to wound flowsheet and photos  Odor  Green drainage    Psychiatric: Mood, affect and judgment normal.       WOUND ASSESSMENT                Wound 06/05/20 Full Thickness Wound Leg Left LLE anterolateral (Active)   Wound Image    10/21/20 1413   Site Assessment Red;Yellow 10/21/20 1413   Periwound Assessment Dry;Edema 10/21/20 1413   Margins Attached edges 10/21/20 1413   Closure Secondary intention 09/30/20 1400   Drainage Amount Large 10/21/20 1413   Drainage Description Yellow;Serous 10/21/20 1413   Treatments Cleansed;Topical Lidocaine;Provider debridement 10/21/20 1413   Wound Cleansing Dakin's Solution 10/21/20 1413   Periwound Protectant Barrier Paste;Skin Moisturizer 10/21/20 1413   Dressing Cleansing/Solutions 1/4 Strength Dakin's Solution 10/21/20 1413   Dressing Options Dry Gauze;Absorbent Abdominal Pad;Dry Roll Gauze;Hypafix Tape;Tubigrip 10/21/20 1413   Dressing Options (Historical)  Wound Vac;Compression Wrap Two Layer 07/15/20 1430   Dressing Changed Changed 09/30/20 1400   Dressing Status Clean;Dry;Intact 09/30/20 1400   Dressing Change/Treatment Frequency Monday, Wednesday, Friday, and As Needed 09/30/20 1400   Non-staged Wound Description Full thickness 10/21/20 1413    Wound Length (cm) 9.1 cm 10/21/20 1413   Wound Width (cm) 3.5 cm 10/21/20 1413   Wound Depth (cm) 0.1 cm 10/21/20 1413   Wound Surface Area (cm^2) 31.85 cm^2 10/21/20 1413   Wound Volume (cm^3) 3.18 cm^3 10/21/20 1413   Post-Procedure Length (cm) 9.3 cm 10/21/20 1413   Post-Procedure Width (cm) 3.5 cm 10/21/20 1413   Post-Procedure Depth (cm) 0.1 cm 10/21/20 1413   Post-Procedure Surface Area (cm^2) 32.55 cm^2 10/21/20 1413   Post-Procedure Volume (cm^3) 3.26 cm^3 10/21/20 1413   Wound Healing % 94 10/21/20 1413   Wound Bed Granulation (%) 20 % 09/30/20 1400   Wound Bed Epithelium (%) 0 % 07/29/20 1450   Wound Bed Slough (%) 40 % 10/21/20 1413   Wound Bed Eschar (%) 0 % 07/29/20 1450   Wound Bed Granulation (%) - Post-Procedure 100 % 09/30/20 1400   Wound Bed Slough % - (Post-Procedure) 5 % 06/05/20 0930   Tunneling (cm) 0 cm 10/21/20 1413   Undermining (cm) 0 cm 10/21/20 1413   Wound Odor None 10/21/20 1413   Pulses Left;2+;DP 06/05/20 0930   Exposed Structures None 10/21/20 1413       Wound 06/05/20 Full Thickness Wound Ankle LLE distal posteriomedial (Active)   Wound Image    10/21/20 1413   Site Assessment Red;Yellow 10/21/20 1413   Periwound Assessment Dry;Edema 10/21/20 1413   Margins Attached edges 10/21/20 1413   Closure Secondary intention 09/30/20 1400   Drainage Amount Large 10/21/20 1413   Drainage Description Yellow;Serous 10/21/20 1413   Treatments Cleansed;Topical Lidocaine;Provider debridement 10/21/20 1413   Wound Cleansing Dakin's Solution 10/21/20 1413   Periwound Protectant Barrier Paste;Skin Moisturizer 10/21/20 1413   Dressing Cleansing/Solutions 1/4 Strength Dakin's Solution 10/21/20 1413   Dressing Options Dry Gauze;Absorbent Abdominal Pad;Dry Roll Gauze;Hypafix Tape;Tubigrip 10/21/20 1413   Dressing Options (Historical)  Hydrofera Blue Ready;Absorbent Abdominal Pad;Compression Wrap Two Layer 07/15/20 1430   Dressing Changed New 08/26/20 1300   Dressing Status Clean;Dry;Intact 08/26/20 1300    Dressing Change/Treatment Frequency Monday, Wednesday, Friday, and As Needed 07/15/20 1430   Non-staged Wound Description Full thickness 10/21/20 1413   Wound Length (cm) 1.2 cm 10/21/20 1413   Wound Width (cm) 4.4 cm 10/21/20 1413   Wound Depth (cm) 0.2 cm 10/21/20 1413   Wound Surface Area (cm^2) 5.28 cm^2 10/21/20 1413   Wound Volume (cm^3) 1.06 cm^3 10/21/20 1413   Post-Procedure Length (cm) 1.3 cm 10/21/20 1413   Post-Procedure Width (cm) 4.4 cm 10/21/20 1413   Post-Procedure Depth (cm) 0.2 cm 10/21/20 1413   Post-Procedure Surface Area (cm^2) 5.72 cm^2 10/21/20 1413   Post-Procedure Volume (cm^3) 1.14 cm^3 10/21/20 1413   Wound Healing % 82 10/21/20 1413   Wound Bed Granulation (%) 10 % 09/30/20 1400   Wound Bed Epithelium (%) 0 % 07/29/20 1450   Wound Bed Slough (%) 50 % 10/21/20 1413   Wound Bed Eschar (%) 0 % 07/29/20 1450   Wound Bed Granulation (%) - Post-Procedure 100 % 09/30/20 1400   Wound Bed Slough % - (Post-Procedure) 20 % 06/12/20 0945   Tunneling (cm) 0 cm 10/21/20 1413   Undermining (cm) 0 cm 10/21/20 1413   Wound Odor None 10/21/20 1413   Pulses Left;2+;DP 06/05/20 0930   Exposed Structures None 10/21/20 1413       Wound 06/05/20 Full Thickness Wound Leg Left LLE posteromedial proximal (Active)   Wound Image    10/21/20 1413   Site Assessment Red;Yellow 10/21/20 1413   Periwound Assessment Dry;Edema 10/21/20 1413   Margins Attached edges;Epibole (rolled edges) 10/21/20 1413   Closure Secondary intention 09/30/20 1400   Drainage Amount Large 10/21/20 1413   Drainage Description Yellow;Serous 10/21/20 1413   Treatments Cleansed;Topical Lidocaine;Provider debridement 10/21/20 1413   Wound Cleansing Dakin's Solution 10/21/20 1413   Periwound Protectant Barrier Paste;Skin Moisturizer 10/21/20 1413   Dressing Cleansing/Solutions 1/4 Strength Dakin's Solution 10/21/20 1413   Dressing Options Dry Gauze;Absorbent Abdominal Pad;Dry Roll Gauze;Hypafix Tape;Tubigrip 10/21/20 1413   Dressing Options  (Historical)  Hydrofera Blue Ready;Absorbent Abdominal Pad;Compression Wrap Two Layer 07/15/20 1430   Dressing Changed Changed 09/30/20 1400   Dressing Status Clean;Dry;Intact 09/30/20 1400   Dressing Change/Treatment Frequency Monday, Wednesday, Friday, and As Needed 08/26/20 1300   Non-staged Wound Description Full thickness 10/21/20 1413   Wound Length (cm) 6.4 cm 10/21/20 1413   Wound Width (cm) 8 cm 10/21/20 1413   Wound Depth (cm) 0.2 cm 10/21/20 1413   Wound Surface Area (cm^2) 51.2 cm^2 10/21/20 1413   Wound Volume (cm^3) 10.24 cm^3 10/21/20 1413   Post-Procedure Length (cm) 6.5 cm 10/21/20 1413   Post-Procedure Width (cm) 8 cm 10/21/20 1413   Post-Procedure Depth (cm) 0.2 cm 10/21/20 1413   Post-Procedure Surface Area (cm^2) 52 cm^2 10/21/20 1413   Post-Procedure Volume (cm^3) 10.4 cm^3 10/21/20 1413   Wound Healing % -35 10/21/20 1413   Wound Bed Granulation (%) 10 % 09/30/20 1400   Wound Bed Epithelium (%) 0 % 07/29/20 1450   Wound Bed Slough (%) 75 % 10/21/20 1413   Wound Bed Eschar (%) 0 % 07/29/20 1450   Wound Bed Granulation (%) - Post-Procedure 100 % 09/30/20 1400   Wound Bed Slough % - (Post-Procedure) 20 % 06/12/20 0945   Tunneling (cm) 0 cm 10/21/20 1413   Undermining (cm) 0 cm 10/21/20 1413   Wound Odor None 10/21/20 1413   Pulses 2+;Left;DP 06/05/20 0930   Exposed Structures None 10/21/20 1413                 PROCEDURE: Excisional debridement of right and left lower extremity wounds   -2% viscous lidocaine applied topically to wound beds for approximately 10 minutes prior to debridement  -Curette used to excise thick layer of slough from wound beds.  Excisional debridement was performed to remove devitalized tissue from all other wounds until healthy, bleeding tissue was visualized.  Total area debrided 90.27 cm² tissue debrided into the subcutaneous layer of all wounds.    -Bleeding controlled with manual pressure.    -Wounds cleansed with normal saline  -Patient tolerated the procedure well,  without c/o pain or discomfort.         Pertinent Labs and Diagnostics:    Labs:     A1c:   Lab Results   Component Value Date/Time    HBA1C 5.7 (H) 2020 11:22 AM      Imagin/3/2020-MRI left tibia-fibula with and without contrast  IMPRESSION:     Moderate lateral leg cellulitis and underlying myositis     No abscess or osteomyelitis    VASCULAR STUDIES: BHUPINDER 2019   Right.    Doppler waveform of the common femoral artery is of high amplitude and    triphasic.    Doppler waveforms at the ankle are brisk and triphasic.    Ankle-brachial index is normal.       Left.    Could not perform ankle-brachial index due to large blisters/ulcers.   Normal toe-brachial index: 0.94   Doppler waveform of the common femoral artery is of high amplitude and    triphasic.    Biphasic waveforms seen at the ankle.     WOUND CULTURE:    DATE: 2020-culture collected in clinic  Culture Result  Abnormal     Beta Hemolytic Streptococcus group A   Heavy growth     Culture Result  Abnormal     Staphylococcus aureus   Moderate growth     Culture Result  Abnormal     Serratia marcescens   Moderate growth     Culture Result  Abnormal     Proteus mirabilis   Moderate growth     Culture Result  Abnormal     Pseudomonas species   Light growth   unable to isolate for sensitivity            ASSESSMENT AND PLAN:     1. Skin ulcer of left lower leg with fat layer exposed (HCC)  Comment: Patient has history of recurring ulcerations to left lower extremity.  Has undergone ablation procedures by Dr. White.  Discharged from [facility] in 2020 due to healing, returned approximately 3 weeks later much worse.  Hospitalized from  until 2020 underwent multiple surgical debridements.    10/21/2020: Drainage well controlled, increased granulation noted in wound beds.  No wound odor noted.  -Excisional debridement of wounds in clinic today, medically necessary to promote wound healing.  -Patient to return to clinic weekly for  assessment and debridement  -Home health to change dressing 1-2 times per week in between clinic visits  -pt/family to change dressing daily using quarter strength Dakin's in between home health wound clinic visits.  Because this wound is progressing, we will continue with this for a little while longer.    Wound care: Quarter strength Dakin's moistened dressing  With tubigrip for compression.  Reconsider resuming aq ag and 2 layer compression wrap in 1 wk.       2. Skin ulcer of multiple sites of right lower extremity with fat layer exposed (HCC)  Comments: First observed in clinic on 6/5/2020, on initial evaluation. Patient is not certain when or how they occurred.  There is no documentation of wounds to this leg during recent hospitalization, so presumably they occurred after he went home.    10/21/2020: remains resolved     Wound care: Open to air.  Tubigrip to manage edema      3. Chronic venous stasis  Comments: Hemosiderin staining, brawny edema, scarring- findings consistent with CVI.  Pt established at [facility], underwent ablation procedures in 2019.     -Consider referral back to vascular if wounds failed to progress or deteriorate.    4. Acquired deformity of left ankle and foot  Comments: Charcot deformity of foot and ankle.  Very little ROM of ankle.  Patient was referred to orthopedics previously, however did not make appointment.    -Patient will need to follow-up with orthopedics once wounds are healed.    5.  Wound infection     10/21/2020: Drainage under better control, no odor noted.  Patient has completed antibiotics  -Monitor at each clinic visit            PATIENT EDUCATION  - Pt advised to go to ER for any increased redness, swelling, drainage or odor, or if he develops fever, chills, nausea or vomiting.    Patient was seen for 15 minutes face to face of which > 50% of appointment time was spent on counseling and coordination of care regarding the above.    Please note that this note may  have been created using voice recognition software. I have worked with technical experts from [facility] to optimize the interface.  I have made every reasonable attempt to correct obvious errors, but there may be errors of grammar and possibly content that I did not discover before finalizing the note.    N

## 2020-10-28 ENCOUNTER — OFFICE VISIT (OUTPATIENT)
Dept: WOUND CARE | Facility: MEDICAL CENTER | Age: 65
End: 2020-10-28
Attending: NURSE PRACTITIONER
Payer: MEDICARE

## 2020-10-28 VITALS
DIASTOLIC BLOOD PRESSURE: 89 MMHG | SYSTOLIC BLOOD PRESSURE: 140 MMHG | HEART RATE: 72 BPM | OXYGEN SATURATION: 99 % | TEMPERATURE: 97.7 F | RESPIRATION RATE: 16 BRPM

## 2020-10-28 DIAGNOSIS — L97.912 SKIN ULCER OF MULTIPLE SITES OF RIGHT LOWER EXTREMITY WITH FAT LAYER EXPOSED (HCC): ICD-10-CM

## 2020-10-28 DIAGNOSIS — L97.922 SKIN ULCER OF LEFT LOWER LEG WITH FAT LAYER EXPOSED (HCC): Primary | ICD-10-CM

## 2020-10-28 DIAGNOSIS — M21.962 ACQUIRED DEFORMITY OF LEFT ANKLE AND FOOT: ICD-10-CM

## 2020-10-28 DIAGNOSIS — I87.8 CHRONIC VENOUS STASIS: ICD-10-CM

## 2020-10-28 PROCEDURE — 11042 DBRDMT SUBQ TIS 1ST 20SQCM/<: CPT | Performed by: NURSE PRACTITIONER

## 2020-10-28 PROCEDURE — 11045 DBRDMT SUBQ TISS EACH ADDL: CPT | Performed by: NURSE PRACTITIONER

## 2020-10-28 PROCEDURE — 11042 DBRDMT SUBQ TIS 1ST 20SQCM/<: CPT

## 2020-10-28 PROCEDURE — 11045 DBRDMT SUBQ TISS EACH ADDL: CPT

## 2020-10-28 ASSESSMENT — ENCOUNTER SYMPTOMS
SHORTNESS OF BREATH: 0
NAUSEA: 0
DEPRESSION: 1
CONSTIPATION: 0
NERVOUS/ANXIOUS: 0
FEVER: 0
VOMITING: 0
CHILLS: 0
COUGH: 0
DIARRHEA: 0
CLAUDICATION: 0
MYALGIAS: 0

## 2020-10-28 ASSESSMENT — PAIN SCALES - GENERAL: PAINLEVEL: 7=MODERATE-SEVERE PAIN

## 2020-10-28 NOTE — PROGRESS NOTES
Provider Encounter- Full Thickness wound    HISTORY OF PRESENT ILLNESS  Wound History:    START OF CARE IN CLINIC: 5/20/2020               REFERRING PROVIDER: KEV Keith                 WOUND ETIOLOGY: venous,  likely mixed etiology              LOCATION: Left lower leg, multiple wounds                                   Right lower leg, multiple wounds-first observed in clinic on initial evaluation, 6/5/2020.  Resolved 9/2/2020                HISTORY: Patient is very well-known to this clinic from previous treatment of wounds to his left lower extremity.  He was discharged from the clinic in March 2020 due to full resolution of his wound.  He returned to the clinic approximately 3 weeks later, on 4/16, with cellulitis, edema, and large open wounds to his left lower leg, and was sent directly to [facility] emergency room.  He was admitted for IV antibiotics and surgical intervention.  On 4/22/2020, he underwent an I&D of his left lower extremity, fasciotomy, and VAC placement.  In the following weeks he underwent surgery 3 more times for irrigation and debridement.  He was discharged home on 5/21, on the VAC, with home health and a referral to [facility].       Pertinent Medical History:  Anxiety, Charcot's joint of left foot, non-diabetic (3/21/2016), Chronic congestive heart failure (HCC) (11/16/2017), Hepatitis C, chronic (HCC), Hypertension, Migraine, Polysubstance abuse (HCC) (3/8/2018), Tobacco use (4/18/2016), Ulcer of left lower extremity with necrosis of muscle (HCC) (3/21/2016), and Venous stasis ulcer ([facility]) (2017).                ETIOLOGY HISTORY:  Vascular Surgeon: Dr. White. Compression Circ-aid. Varicose Veins none visible     TOBACCO USE: Patient denies; patient also denies alcohol and recreational drug use    Patient's problem list, allergies, and current medications reviewed and updated in Epic    Interval History:  6/5/2020: Initial clinic visit with KEV Brantley.  Patient  "returns to clinic after a long hospitalization.  The VAC was held by [facility] due to severe maceration and deterioration of wound beds.  He also presents today with new wounds to his right lower extremity.  He states he does not know how or when these occurred, states, \"they just popped up\".  He is uncertain whether or not he has follow-up appointments with his surgeon.  He does not know what medications he is on.      6/12/2020 : Clinic visit with KEV Brantley.  Goran states he is feeling well today,denies fevers, chills, nausea, vomiting, cough or shortness of breath.  Condition of his periwound skin is significantly improved.  I would like to restart VAC, however he did not bring the VAC with him, and that has already been on for 2-1/2 weeks.  Will likely need to be reordered.  [facility] continues to see him several times per week for dressing changes.  He is tolerating compression without any difficulty.    6/19/2020 : Clinic visit with KEV Brantley.   Goran states he is feeling well today,denies fevers, chills, nausea, vomiting, cough or shortness of breath.  He brought his VAC with him today.  However,  too much slough on wound beds to consider re-applying.  [facility] continues to see him 2 times per week.  I have asked him to bring the VAC with him again next week, and will hopefully place it at that time.  Per patient, dressings are saturated between dressing changes.    6/26/2020 : Clinic visit with KEV Brantley.   Goran states he is feeling well today,denies fevers, chills, nausea, vomiting, cough or shortness of breath.  He brought his VAC with him again today, however we are not able to put it on as he is still having twice drainage.  Sensitivities from culture still pending.  However I did start him on Augmentin today which we are able to obtain from [facility] prior to his leaving the clinic today and had him take one before he left.  [facility] is been seen 2 times per week " in between clinic visits.  We will try and get them to see him over the weekend to do an additional dressing change.  Will adjust antibiotics once sensitivities are known.  Hopefully start VAC next clinic visit.    7/2/2020 : Clinic visit with KEV Brantley.    Goran states he is feeling well today,denies fevers, chills, nausea, vomiting, cough or shortness of breath.  His VAC is here in the clinic, however he does not have another home health appointment until Monday, 4 days from now.  He has been taking Augmentin as prescribed last week.  I informed him that I added Bactrim DS after I got sensitivity results, and that it should be waiting for him at his pharmacy.    7/9/2020: Clinic visit with KEV Luna. Patient states that they are feeling well today.  Patient denies fever, chills, nausea, vomiting, lightheadedness, dizziness, shortness of breath and chest pain.  All wound beds debrided in clinic today patient's wounds continue to progress beefy red granulation tissue to left lower extremity lateral wound tendon is being covered with granulation tissue continue with wound VAC and current plan of care.    7/15/2020 : Clinic visit with KEV Brantley.  Goran states he is feeling well today,denies fevers, chills, nausea, vomiting, cough or shortness of breath.  Home health continues to see him in between clinic visits.  He states he has not been smoking, drinking or using any street drugs.    7/22/2020 : Clinic visit with KEV Brantley.   Goran states he is feeling well today,denies fevers, chills, nausea, vomiting, cough or shortness of breath.  Home health continues to see him in between clinic visits.    7/29/2020: Clinic visit with KEV Luna. Patient states that they are feeling well today.  Patient denies fever, chills, nausea, vomiting, lightheadedness, dizziness, shortness of breath and chest pain.    8/5/2020: Clinic visit with KEV Luna. Patient  states that they are feeling well today.  Patient denies fever, chills, nausea, vomiting, lightheadedness, dizziness, shortness of breath and chest pain.  Patient's wounds are foul-smelling today.  Patient had positive culture we have already ordered the patient Cipro and Augmentin patient reports that he got my message from last night.  Patient reports that he is can  antibiotics tonight in the ED initiate them.  Patient has been referred to infectious disease due to multiple bacterial growth and for their expert opinion.    8/20/2020 : Clinic visit with KEV Brantley.  Goran states he is feeling well, denies fevers, chills, nausea, vomiting, cough or shortness of breath.  He states he has not yet heard from ID.  However, review of referral note indicates that a message was left on his phone earlier today.  Kodi states he will check his message and will call them back.  He completed his antibiotics 2 days ago.    9/2/2020 : Clinic visit with KEV Brantley.   Goran states he is feeling well, denies fevers, chills, nausea, vomiting, cough or shortness of breath.  He was seen by ID yesterday, no new antibiotics ordered, however MRI ordered to assess for OM.  Goran has an appoint with his PCP tomorrow.    9/9/2020 : Clinic visit with KEV Brantley.  Goran states he is feeling well, denies fevers, chills, nausea, vomiting, cough or shortness of breath.  Reviewed MRI results with him in clinic today, no evidence of OM.    9/16/2020 : Clinic visit with KEV Brantley.  Goran presents today looking more disheveled than usual, he wore sunglasses throughout the visit.  His speech is a bit pressured and disorganized.  He denies using any street drugs or drinking alcohol.  His dressings are saturated, and his wounds have deteriorated significantly.  He denies fevers, chills, nausea, vomiting, cough or shortness of breath.  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  Home health continues to see him in between clinic visits for dressing changes.    9/30/2020 : Clinic visit with KEV Brantley.  Goran states he is feeling well today, denies fevers, chills, nausea, vomiting, cough or shortness of breath.  He missed his appointment last week, arrived too late and could not be seen.  He is again wearing his sunglasses throughout today's visit.  He denies using any street drugs or drinking alcohol.  Home health continues to see him in between clinic visits.    10/9/2020: Clinic visit with Abeba ANGULO. Pt denies fevers, chills, nausea, vomiting. Followed by HH. Presents with foul smelling fluorescent green/yellow drainage from wounds and on dressings.     10/21/2020 : Clinic visit with KEV Brantley.  Goran states he is feeling well today, denies fevers, chills, nausea, vomiting, cough or shortness of breath.  He completed his antibiotics a few days ago.  He has not been showering with his wound open, states his house has some, \"plumbing issues\".  Wound has been treated with daily Dakin's moistened gauze.  Patient has been able to change dressing in between clinic visits.    10/28/2020: Clinic visit with KEV Luna. Patient states that they are feeling well today.  Patient denies fever, chills, nausea, vomiting, lightheadedness, dizziness, shortness of breath and chest pain.  We will hold Dakin's application for 1 week.  Left anterior lower extremity wound is progressing however slowly, appears to have red beefy granulation tissue forming.  Medial and posterior wounds continue to wax and wane.      REVIEW OF SYSTEMS:   Review of Systems   Constitutional: Negative for chills and fever.        States he is eating well, appetite normal   Respiratory: Negative for cough and shortness of breath.    Cardiovascular: Positive for leg swelling. Negative for chest pain and " claudication.        Chronic swelling in legs, for several years   Gastrointestinal: Negative for constipation, diarrhea, nausea and vomiting.   Genitourinary: Negative for dysuria.   Musculoskeletal: Positive for joint pain. Negative for myalgias.        Chronic   Psychiatric/Behavioral: Positive for depression. The patient is not nervous/anxious.        PHYSICAL EXAMINATION:   /89   Pulse 72   Temp 36.5 °C (97.7 °F)   Resp 16   SpO2 99%     Physical Exam   Constitutional: He is oriented to person, place, and time and well-developed, well-nourished, and in no distress.   Disheveled unkept   HENT:   Head: Normocephalic and atraumatic.   Eyes: Pupils are equal, round, and reactive to light. Conjunctivae and EOM are normal.   Neck: Neck supple.   Cardiovascular: Intact distal pulses.   Pulmonary/Chest: Effort normal.   Musculoskeletal: Normal range of motion.         General: Deformity and edema present.      Comments: Left charcot foot and ankle  1+pitting edema to bilateral LE   Neurological: He is alert and oriented to person, place, and time.   Skin: Skin is warm. There is erythema.   Large full-thickness wounds to left lower extremities  Refer to wound flowsheet and photos  Drainage has improved to a more clear color since the use of Dakin's solution   Psychiatric: Mood, affect and judgment normal.       WOUND ASSESSMENT          Wound 06/05/20 Full Thickness Wound Leg Left LLE anterolateral (Active)   Wound Image    10/28/20 0900   Site Assessment Red;Yellow 10/28/20 0900   Periwound Assessment Dry;Edema 10/28/20 0900   Margins Attached edges 10/28/20 0900   Closure Secondary intention 09/30/20 1400   Drainage Amount Large 10/28/20 0900   Drainage Description Yellow;Serous 10/28/20 0900   Treatments Cleansed;Topical Lidocaine;Provider debridement 10/28/20 0900   Wound Cleansing Puracyn [place] 10/28/20 0900   Periwound Protectant Barrier Paste;Skin Moisturizer 10/28/20 0900   Dressing Cleansing/Solutions  Not Applicable 10/28/20 0900   Dressing Options Hydrofiber Silver;Hydrofiber;Absorbent Abdominal Pad;Compression Wrap Two Layer 10/28/20 0900   Dressing Options (Historical)  Wound Vac;Compression Wrap Two Layer 07/15/20 1430   Dressing Changed Changed 10/28/20 0900   Dressing Status Clean;Dry;Intact 10/28/20 0900   Dressing Change/Treatment Frequency Every 48 hrs, and As Needed 10/28/20 0900   Non-staged Wound Description Full thickness 10/28/20 0900   Wound Length (cm) 8.9 cm 10/28/20 0900   Wound Width (cm) 3 cm 10/28/20 0900   Wound Depth (cm) 0.1 cm 10/21/20 1413   Wound Surface Area (cm^2) 26.7 cm^2 10/28/20 0900   Wound Volume (cm^3) 3.18 cm^3 10/21/20 1413   Post-Procedure Length (cm) 9 cm 10/28/20 0900   Post-Procedure Width (cm) 3.2 cm 10/28/20 0900   Post-Procedure Depth (cm) 0.1 cm 10/21/20 1413   Post-Procedure Surface Area (cm^2) 28.8 cm^2 10/28/20 0900   Post-Procedure Volume (cm^3) 3.26 cm^3 10/21/20 1413   Wound Healing % 94 10/21/20 1413   Wound Bed Granulation (%) 20 % 09/30/20 1400   Wound Bed Epithelium (%) 0 % 07/29/20 1450   Wound Bed Slough (%) 40 % 10/21/20 1413   Wound Bed Eschar (%) 0 % 07/29/20 1450   Wound Bed Granulation (%) - Post-Procedure 100 % 09/30/20 1400   Wound Bed Slough % - (Post-Procedure) 5 % 06/05/20 0930   Tunneling (cm) 0 cm 10/28/20 0900   Undermining (cm) 0 cm 10/28/20 0900   Wound Odor None 10/28/20 0900   Pulses Left;2+;DP 06/05/20 0930   Exposed Structures None 10/28/20 0900       Wound 06/05/20 Full Thickness Wound Ankle LLE distal posteromedial (Active)   Wound Image    10/28/20 0900   Site Assessment Red;Yellow 10/28/20 0900   Periwound Assessment Dry;Edema 10/28/20 0900   Margins Attached edges 10/28/20 0900   Closure Secondary intention 09/30/20 1400   Drainage Amount Large 10/28/20 0900   Drainage Description Yellow;Serous 10/28/20 0900   Treatments Cleansed;Topical Lidocaine;Provider debridement 10/28/20 0900   Wound Cleansing Puracyn [place] 10/28/20 0900    Periwound Protectant Barrier Paste;Skin Moisturizer 10/28/20 0900   Dressing Cleansing/Solutions Not Applicable 10/28/20 0900   Dressing Options Hydrofiber Silver;Hydrofiber;Absorbent Abdominal Pad;Compression Wrap Two Layer 10/28/20 0900   Dressing Options (Historical)  Hydrofera Blue Ready;Absorbent Abdominal Pad;Compression Wrap Two Layer 07/15/20 1430   Dressing Changed Changed 10/28/20 0900   Dressing Status Clean;Dry;Intact 10/28/20 0900   Dressing Change/Treatment Frequency Every 48 hrs, and As Needed 10/28/20 0900   Non-staged Wound Description Full thickness 10/28/20 0900   Wound Length (cm) 1.5 cm 10/28/20 0900   Wound Width (cm) 2.5 cm 10/28/20 0900   Wound Depth (cm) 0.1 cm 10/28/20 0900   Wound Surface Area (cm^2) 3.75 cm^2 10/28/20 0900   Wound Volume (cm^3) 0.38 cm^3 10/28/20 0900   Post-Procedure Length (cm) 2 cm 10/28/20 0900   Post-Procedure Width (cm) 2.8 cm 10/28/20 0900   Post-Procedure Depth (cm) 0.2 cm 10/28/20 0900   Post-Procedure Surface Area (cm^2) 5.6 cm^2 10/28/20 0900   Post-Procedure Volume (cm^3) 1.12 cm^3 10/28/20 0900   Wound Healing % 94 10/28/20 0900   Wound Bed Granulation (%) 10 % 09/30/20 1400   Wound Bed Epithelium (%) 0 % 07/29/20 1450   Wound Bed Slough (%) 50 % 10/21/20 1413   Wound Bed Eschar (%) 0 % 07/29/20 1450   Wound Bed Granulation (%) - Post-Procedure 100 % 09/30/20 1400   Wound Bed Slough % - (Post-Procedure) 20 % 06/12/20 0945   Tunneling (cm) 0 cm 10/28/20 0900   Undermining (cm) 0 cm 10/28/20 0900   Wound Odor None 10/28/20 0900   Pulses Left;2+;DP 06/05/20 0930   Exposed Structures None 10/28/20 0900       Wound 06/05/20 Full Thickness Wound Leg Left LLE posteromedial proximal (Active)   Wound Image    10/28/20 0900   Site Assessment Red;Yellow 10/28/20 0900   Periwound Assessment Dry;Edema 10/28/20 0900   Margins Attached edges;Epibole (rolled edges) 10/28/20 0900   Closure Secondary intention 09/30/20 1400   Drainage Amount Large 10/28/20 0900   Drainage  Description Yellow;Serous 10/28/20 0900   Treatments Cleansed;Topical Lidocaine;Provider debridement 10/28/20 0900   Wound Cleansing Puracyn [place] 10/28/20 0900   Periwound Protectant Barrier Paste;Skin Moisturizer 10/28/20 0900   Dressing Cleansing/Solutions Not Applicable 10/28/20 0900   Dressing Options Hydrofiber Silver;Hydrofiber;Absorbent Abdominal Pad 10/28/20 0900   Dressing Options (Historical)  Hydrofera Blue Ready;Absorbent Abdominal Pad;Compression Wrap Two Layer 07/15/20 1430   Dressing Changed Changed 10/28/20 0900   Dressing Status Clean;Dry;Intact 10/28/20 0900   Dressing Change/Treatment Frequency Every 48 hrs, and As Needed 10/28/20 0900   Non-staged Wound Description Full thickness 10/28/20 0900   Wound Length (cm) 6.5 cm 10/28/20 0900   Wound Width (cm) 7 cm 10/28/20 0900   Wound Depth (cm) 0.2 cm 10/28/20 0900   Wound Surface Area (cm^2) 45.5 cm^2 10/28/20 0900   Wound Volume (cm^3) 9.1 cm^3 10/28/20 0900   Post-Procedure Length (cm) 6.6 cm 10/28/20 0900   Post-Procedure Width (cm) 7.2 cm 10/28/20 0900   Post-Procedure Depth (cm) 0.2 cm 10/28/20 0900   Post-Procedure Surface Area (cm^2) 47.52 cm^2 10/28/20 0900   Post-Procedure Volume (cm^3) 9.5 cm^3 10/28/20 0900   Wound Healing % -20 10/28/20 0900   Wound Bed Granulation (%) 10 % 09/30/20 1400   Wound Bed Epithelium (%) 0 % 07/29/20 1450   Wound Bed Slough (%) 75 % 10/21/20 1413   Wound Bed Eschar (%) 0 % 07/29/20 1450   Wound Bed Granulation (%) - Post-Procedure 100 % 09/30/20 1400   Wound Bed Slough % - (Post-Procedure) 20 % 06/12/20 0945   Tunneling (cm) 0 cm 10/28/20 0900   Undermining (cm) 0 cm 10/28/20 0900   Wound Odor None 10/28/20 0900   Pulses 2+;Left;DP 06/05/20 0930   Exposed Structures None 10/28/20 0900       Wound 10/28/20 Left dorsal 4th toe (Active)   Wound Image    10/28/20 0900   Site Assessment Yellow;[place] 10/28/20 0900   Periwound Assessment Intact 10/28/20 0900   Margins Attached edges 10/28/20 0900   Drainage Amount  Scant 10/28/20 0900   Drainage Description Serosanguineous 10/28/20 0900   Treatments Cleansed;Topical Lidocaine;Provider debridement 10/28/20 0900   Wound Cleansing Puracyn [place] 10/28/20 0900   Periwound Protectant Barrier Paste 10/28/20 0900   Dressing Cleansing/Solutions Not Applicable 10/28/20 0900   Dressing Options Bandaid 10/28/20 0900   Dressing Changed New 10/28/20 0900   Dressing Status Clean;Dry;Intact 10/28/20 0900   Dressing Change/Treatment Frequency Every 48 hrs, and As Needed 10/28/20 0900   Non-staged Wound Description Partial thickness 10/28/20 0900   Wound Length (cm) 0.8 cm 10/28/20 0900   Wound Width (cm) 0.8 cm 10/28/20 0900   Wound Depth (cm) 0.1 cm 10/28/20 0900   Wound Surface Area (cm^2) 0.64 cm^2 10/28/20 0900   Wound Volume (cm^3) 0.06 cm^3 10/28/20 0900   Post-Procedure Length (cm) 0.6 cm 10/28/20 0900   Post-Procedure Width (cm) 0.3 cm 10/28/20 0900   Post-Procedure Depth (cm) 0.1 cm 10/28/20 0900   Post-Procedure Surface Area (cm^2) 0.18 cm^2 10/28/20 0900   Post-Procedure Volume (cm^3) 0.02 cm^3 10/28/20 0900   Tunneling (cm) 0 cm 10/28/20 0900   Undermining (cm) 0 cm 10/28/20 0900   Wound Odor None 10/28/20 0900   Exposed Structures None 10/28/20 0900             PROCEDURE: Excisional debridement of right and left lower extremity wounds   -2% viscous lidocaine applied topically to wound beds for approximately 10 minutes prior to debridement  -Curette used to excise thick layer of slough from wound beds.  Excisional debridement was performed to remove devitalized tissue from all other wounds until healthy, bleeding tissue was visualized.  Total area debrided 81.92 cm² tissue debrided into the subcutaneous layer of all wounds.    -Bleeding controlled with manual pressure.    -Wounds cleansed with normal saline  -Patient tolerated the procedure well, without c/o pain or discomfort.         Pertinent Labs and Diagnostics:    Labs:     A1c:   Lab Results   Component Value Date/Time     HBA1C 5.7 (H) 2020 11:22 AM      Imagin/3/2020-MRI left tibia-fibula with and without contrast  IMPRESSION:     Moderate lateral leg cellulitis and underlying myositis     No abscess or osteomyelitis    VASCULAR STUDIES: BHUPINDER 2019   Right.    Doppler waveform of the common femoral artery is of high amplitude and    triphasic.    Doppler waveforms at the ankle are brisk and triphasic.    Ankle-brachial index is normal.       Left.    Could not perform ankle-brachial index due to large blisters/ulcers.   Normal toe-brachial index: 0.94   Doppler waveform of the common femoral artery is of high amplitude and    triphasic.    Biphasic waveforms seen at the ankle.     WOUND CULTURE:    DATE: 2020-culture collected in clinic  Culture Result  Abnormal     Beta Hemolytic Streptococcus group A   Heavy growth     Culture Result  Abnormal     Staphylococcus aureus   Moderate growth     Culture Result  Abnormal     Serratia marcescens   Moderate growth     Culture Result  Abnormal     Proteus mirabilis   Moderate growth     Culture Result  Abnormal     Pseudomonas species   Light growth   unable to isolate for sensitivity            ASSESSMENT AND PLAN:     1. Skin ulcer of left lower leg with fat layer exposed (HCC)  Comment: Patient has history of recurring ulcerations to left lower extremity.  Has undergone ablation procedures by Dr. White.  Discharged from [facility] in 2020 due to healing, returned approximately 3 weeks later much worse.  Hospitalized from  until 2020 underwent multiple surgical debridements.    10/28/2020: Drainage well controlled, increased granulation noted in wound beds.  No wound odor noted.  -Excisional debridement of wounds in clinic today, medically necessary to promote wound healing.  -Patient to return to clinic weekly for assessment and debridement  -Home health to change dressing 1-2 times per week in between clinic visits  -Patient has improved significant with  Dakin's solution.  We will attempt advanced dressings at this time and hold Dakin's.    Wound care: Hydrofiber silver, Hydrofiber, absorbent elbow pad, compression 2 layer wrap      2. Skin ulcer of multiple sites of right lower extremity with fat layer exposed (HCC)  Comments: First observed in clinic on 6/5/2020, on initial evaluation. Patient is not certain when or how they occurred.  There is no documentation of wounds to this leg during recent hospitalization, so presumably they occurred after he went home.    10/28/2020: remains resolved     Wound care: Open to air.  Tubigrip to manage edema      3. Chronic venous stasis  Comments: Hemosiderin staining, brawny edema, scarring- findings consistent with CVI.  Pt established at [facility], underwent ablation procedures in 2019.     -Consider referral back to vascular if wounds failed to progress or deteriorate.    4. Acquired deformity of left ankle and foot  Comments: Charcot deformity of foot and ankle.  Very little ROM of ankle.  Patient was referred to orthopedics previously, however did not make appointment.    -Patient will need to follow-up with orthopedics once wounds are healed.    5.  Wound infection     10/28/2020: Drainage under better control, no odor noted.  Patient has completed antibiotics  -No signs or symptoms of infection this clinic visit  -Continue to monitor for signs symptoms use additional clinic visit        PATIENT EDUCATION  - Pt advised to go to ER for any increased redness, swelling, drainage or odor, or if he develops fever, chills, nausea or vomiting.      Please note that this note may have been created using voice recognition software. I have worked with technical experts from [employer] to optimize the interface.  I have made every reasonable attempt to correct obvious errors, but there may be errors of grammar and possibly content that I did not discover before finalizing the note.    N

## 2020-10-28 NOTE — LETTER
October 28, 2020    To: [facility] (fax #[phone])    Re: Goran Chavez (Date of Birth 1955)    Wound Care Order:    Patient may shower with dressings off prior to dressing changes.    · Remove old dressings.  · Cleanse patient's leg with warm washcloth and no rinse foam cleanser.   · Cleanse left leg wounds with Puracyn Spray. Pat dry.  · Apply zinc barrier cream to sandrine-wounds.  · Apply aquacel ag over wounds and cover with plain hydrofiber.   · Cover dressings with ABD pads.  · Apply 2 layer compression wrap to left lower leg. Ensure that wrap starts at just below the toes to two finger breadths below the bend of the knee.   · Home Health to see patient once per week for dressing changes, and patient to come to [facility] for debridement once a week.     Please call [phone] with any questions or concerns.     Thank you,   Sanju ANGULO

## 2020-11-04 ENCOUNTER — APPOINTMENT (OUTPATIENT)
Dept: WOUND CARE | Facility: MEDICAL CENTER | Age: 65
End: 2020-11-04
Attending: NURSE PRACTITIONER
Payer: MEDICARE

## 2020-11-11 ENCOUNTER — OFFICE VISIT (OUTPATIENT)
Dept: WOUND CARE | Facility: MEDICAL CENTER | Age: 65
End: 2020-11-11
Attending: NURSE PRACTITIONER
Payer: MEDICARE

## 2020-11-11 VITALS
RESPIRATION RATE: 16 BRPM | HEART RATE: 66 BPM | DIASTOLIC BLOOD PRESSURE: 91 MMHG | OXYGEN SATURATION: 94 % | TEMPERATURE: 99.1 F | SYSTOLIC BLOOD PRESSURE: 148 MMHG

## 2020-11-11 DIAGNOSIS — L08.9 WOUND INFECTION: ICD-10-CM

## 2020-11-11 DIAGNOSIS — M21.962 ACQUIRED DEFORMITY OF LEFT ANKLE AND FOOT: ICD-10-CM

## 2020-11-11 DIAGNOSIS — I87.8 CHRONIC VENOUS STASIS: ICD-10-CM

## 2020-11-11 DIAGNOSIS — T14.8XXA WOUND INFECTION: ICD-10-CM

## 2020-11-11 DIAGNOSIS — L97.922 SKIN ULCER OF LEFT LOWER LEG WITH FAT LAYER EXPOSED (HCC): ICD-10-CM

## 2020-11-11 PROCEDURE — 11042 DBRDMT SUBQ TIS 1ST 20SQCM/<: CPT | Performed by: NURSE PRACTITIONER

## 2020-11-11 PROCEDURE — 11045 DBRDMT SUBQ TISS EACH ADDL: CPT

## 2020-11-11 PROCEDURE — 11042 DBRDMT SUBQ TIS 1ST 20SQCM/<: CPT

## 2020-11-11 PROCEDURE — 11045 DBRDMT SUBQ TISS EACH ADDL: CPT | Performed by: NURSE PRACTITIONER

## 2020-11-11 ASSESSMENT — ENCOUNTER SYMPTOMS
SHORTNESS OF BREATH: 0
CLAUDICATION: 0
CHILLS: 0
MYALGIAS: 0
NAUSEA: 0
VOMITING: 0
FEVER: 0
NERVOUS/ANXIOUS: 0
COUGH: 0
DEPRESSION: 1
CONSTIPATION: 0
DIARRHEA: 0

## 2020-11-11 NOTE — PROGRESS NOTES
Provider Encounter- Full Thickness wound    HISTORY OF PRESENT ILLNESS  Wound History:    START OF CARE IN CLINIC: 5/20/2020               REFERRING PROVIDER: KEV Keith                 WOUND ETIOLOGY: venous,  likely mixed etiology              LOCATION: Left lower leg, multiple wounds                                   Right lower leg, multiple wounds-first observed in clinic on initial evaluation, 6/5/2020.  Resolved 9/2/2020                HISTORY: Patient is very well-known to this clinic from previous treatment of wounds to his left lower extremity.  He was discharged from the clinic in March 2020 due to full resolution of his wound.  He returned to the clinic approximately 3 weeks later, on 4/16, with cellulitis, edema, and large open wounds to his left lower leg, and was sent directly to [facility] emergency room.  He was admitted for IV antibiotics and surgical intervention.  On 4/22/2020, he underwent an I&D of his left lower extremity, fasciotomy, and VAC placement.  In the following weeks he underwent surgery 3 more times for irrigation and debridement.  He was discharged home on 5/21, on the VAC, with home health and a referral to [facility].       Pertinent Medical History:  Anxiety, Charcot's joint of left foot, non-diabetic (3/21/2016), Chronic congestive heart failure (HCC) (11/16/2017), Hepatitis C, chronic (HCC), Hypertension, Migraine, Polysubstance abuse (HCC) (3/8/2018), Tobacco use (4/18/2016), Ulcer of left lower extremity with necrosis of muscle (HCC) (3/21/2016), and Venous stasis ulcer ([facility]) (2017).                ETIOLOGY HISTORY:  Vascular Surgeon: Dr. White. Compression Circ-aid. Varicose Veins none visible     TOBACCO USE: Patient denies; patient also denies alcohol and recreational drug use    Patient's problem list, allergies, and current medications reviewed and updated in Epic    Interval History:  6/5/2020: Initial clinic visit with KEV Brantley.  Patient  "returns to clinic after a long hospitalization.  The VAC was held by [facility] due to severe maceration and deterioration of wound beds.  He also presents today with new wounds to his right lower extremity.  He states he does not know how or when these occurred, states, \"they just popped up\".  He is uncertain whether or not he has follow-up appointments with his surgeon.  He does not know what medications he is on.      6/12/2020 : Clinic visit with KEV Brantley.  Goran states he is feeling well today,denies fevers, chills, nausea, vomiting, cough or shortness of breath.  Condition of his periwound skin is significantly improved.  I would like to restart VAC, however he did not bring the VAC with him, and that has already been on for 2-1/2 weeks.  Will likely need to be reordered.  [facility] continues to see him several times per week for dressing changes.  He is tolerating compression without any difficulty.    6/19/2020 : Clinic visit with KEV Brantley.   Goran states he is feeling well today,denies fevers, chills, nausea, vomiting, cough or shortness of breath.  He brought his VAC with him today.  However,  too much slough on wound beds to consider re-applying.  [facility] continues to see him 2 times per week.  I have asked him to bring the VAC with him again next week, and will hopefully place it at that time.  Per patient, dressings are saturated between dressing changes.    6/26/2020 : Clinic visit with KEV Brantley.   Goran states he is feeling well today,denies fevers, chills, nausea, vomiting, cough or shortness of breath.  He brought his VAC with him again today, however we are not able to put it on as he is still having twice drainage.  Sensitivities from culture still pending.  However I did start him on Augmentin today which we are able to obtain from [facility] prior to his leaving the clinic today and had him take one before he left.  [facility] is been seen 2 times per week " in between clinic visits.  We will try and get them to see him over the weekend to do an additional dressing change.  Will adjust antibiotics once sensitivities are known.  Hopefully start VAC next clinic visit.    7/2/2020 : Clinic visit with KEV Brantley.    Goran states he is feeling well today,denies fevers, chills, nausea, vomiting, cough or shortness of breath.  His VAC is here in the clinic, however he does not have another home health appointment until Monday, 4 days from now.  He has been taking Augmentin as prescribed last week.  I informed him that I added Bactrim DS after I got sensitivity results, and that it should be waiting for him at his pharmacy.    7/9/2020: Clinic visit with KEV Luna. Patient states that they are feeling well today.  Patient denies fever, chills, nausea, vomiting, lightheadedness, dizziness, shortness of breath and chest pain.  All wound beds debrided in clinic today patient's wounds continue to progress beefy red granulation tissue to left lower extremity lateral wound tendon is being covered with granulation tissue continue with wound VAC and current plan of care.    7/15/2020 : Clinic visit with KEV Brantley.  Goran states he is feeling well today,denies fevers, chills, nausea, vomiting, cough or shortness of breath.  Home health continues to see him in between clinic visits.  He states he has not been smoking, drinking or using any street drugs.    7/22/2020 : Clinic visit with KEV Brantley.   Goran states he is feeling well today,denies fevers, chills, nausea, vomiting, cough or shortness of breath.  Home health continues to see him in between clinic visits.    7/29/2020: Clinic visit with KEV Luna. Patient states that they are feeling well today.  Patient denies fever, chills, nausea, vomiting, lightheadedness, dizziness, shortness of breath and chest pain.    8/5/2020: Clinic visit with KEV Luna. Patient  states that they are feeling well today.  Patient denies fever, chills, nausea, vomiting, lightheadedness, dizziness, shortness of breath and chest pain.  Patient's wounds are foul-smelling today.  Patient had positive culture we have already ordered the patient Cipro and Augmentin patient reports that he got my message from last night.  Patient reports that he is can  antibiotics tonight in the ED initiate them.  Patient has been referred to infectious disease due to multiple bacterial growth and for their expert opinion.    8/20/2020 : Clinic visit with KEV Brantley.  Goran states he is feeling well, denies fevers, chills, nausea, vomiting, cough or shortness of breath.  He states he has not yet heard from ID.  However, review of referral note indicates that a message was left on his phone earlier today.  Kodi states he will check his message and will call them back.  He completed his antibiotics 2 days ago.    9/2/2020 : Clinic visit with KEV Brantley.   Goran states he is feeling well, denies fevers, chills, nausea, vomiting, cough or shortness of breath.  He was seen by ID yesterday, no new antibiotics ordered, however MRI ordered to assess for OM.  Goran has an appoint with his PCP tomorrow.    9/9/2020 : Clinic visit with KEV Brantley.  Goran states he is feeling well, denies fevers, chills, nausea, vomiting, cough or shortness of breath.  Reviewed MRI results with him in clinic today, no evidence of OM.    9/16/2020 : Clinic visit with KEV Brantley.  Goran presents today looking more disheveled than usual, he wore sunglasses throughout the visit.  His speech is a bit pressured and disorganized.  He denies using any street drugs or drinking alcohol.  His dressings are saturated, and his wounds have deteriorated significantly.  He denies fevers, chills, nausea, vomiting, cough or shortness of breath.  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  Home health continues to see him in between clinic visits for dressing changes.    9/30/2020 : Clinic visit with KEV Brantley.  Goran states he is feeling well today, denies fevers, chills, nausea, vomiting, cough or shortness of breath.  He missed his appointment last week, arrived too late and could not be seen.  He is again wearing his sunglasses throughout today's visit.  He denies using any street drugs or drinking alcohol.  Home health continues to see him in between clinic visits.    10/9/2020: Clinic visit with Abeba ANGULO. Pt denies fevers, chills, nausea, vomiting. Followed by HH. Presents with foul smelling fluorescent green/yellow drainage from wounds and on dressings.     10/21/2020 : Clinic visit with KEV Brantley.  Goran states he is feeling well today, denies fevers, chills, nausea, vomiting, cough or shortness of breath.  He completed his antibiotics a few days ago.  He has not been showering with his wound open, states his house has some, \"plumbing issues\".  Wound has been treated with daily Dakin's moistened gauze.  Patient has been able to change dressing in between clinic visits.    10/28/2020: Clinic visit with KEV Luna. Patient states that they are feeling well today.  Patient denies fever, chills, nausea, vomiting, lightheadedness, dizziness, shortness of breath and chest pain.  We will hold Dakin's application for 1 week.  Left anterior lower extremity wound is progressing however slowly, appears to have red beefy granulation tissue forming.  Medial and posterior wounds continue to wax and wane.      11/11/2020 : Clinic visit with KEV Brantley.  Goran states he is feeling well today, denies fevers, chills, nausea, vomiting, cough or shortness of breath.  Normally home health sees him 2 times per week in between clinic visits.  However, one of his appointments " was canceled due to illness of home health RN, and he went 5 days in between dressing changes.  He states that during this time the drainage and odor increase significantly.    REVIEW OF SYSTEMS:   Review of Systems   Constitutional: Negative for chills and fever.        States he is eating well, appetite normal   Respiratory: Negative for cough and shortness of breath.    Cardiovascular: Positive for leg swelling. Negative for chest pain and claudication.        Chronic swelling in legs, for several years   Gastrointestinal: Negative for constipation, diarrhea, nausea and vomiting.   Genitourinary: Negative for dysuria.   Musculoskeletal: Positive for joint pain. Negative for myalgias.        Chronic   Psychiatric/Behavioral: Positive for depression. The patient is not nervous/anxious.        PHYSICAL EXAMINATION:   /91   Pulse 66   Temp 37.3 °C (99.1 °F)   Resp 16   SpO2 94%     Physical Exam   Constitutional: He is oriented to person, place, and time and well-developed, well-nourished, and in no distress.   HENT:   Head: Normocephalic and atraumatic.   Eyes: Pupils are equal, round, and reactive to light. Conjunctivae and EOM are normal.   Neck: Neck supple.   Cardiovascular: Intact distal pulses.   Pulmonary/Chest: Effort normal.   Musculoskeletal: Normal range of motion.         General: Deformity and edema present.      Comments: Left charcot foot and ankle  1+pitting edema to bilateral LE   Neurological: He is alert and oriented to person, place, and time.   Skin: Skin is warm. There is erythema.   Large full-thickness wounds to left lower extremities  Refer to wound flowsheet and photos     Psychiatric: Mood, affect and judgment normal.       WOUND ASSESSMENT         Wound 06/05/20 Full Thickness Wound Leg Left LLE anterolateral (Active)   Wound Image    11/11/20 1300   Site Assessment Red;Yellow 11/11/20 1300   Periwound Assessment Dry;Edema;Other (Comment) 11/11/20 1300   Margins Attached edges  11/11/20 1300   Closure Secondary intention 11/11/20 1300   Drainage Amount Large 11/11/20 1300   Drainage Description Serous 11/11/20 1300   Treatments Cleansed;Topical Lidocaine;Provider debridement 11/11/20 1300   Wound Cleansing Foam Cleanser/Washcloth 11/11/20 1300   Periwound Protectant Barrier Paste;Skin Moisturizer;Stoma Powder 11/11/20 1300   Dressing Cleansing/Solutions Not Applicable 10/28/20 0900   Dressing Options Hydrofiber Silver;Hydrofiber;Absorbent Abdominal Pad;Compression Wrap Two Layer 11/11/20 1300   Dressing Options (Historical)  Wound Vac;Compression Wrap Two Layer 07/15/20 1430   Dressing Changed Changed 11/11/20 1300   Dressing Status Clean;Dry;Intact 11/11/20 1300   Dressing Change/Treatment Frequency Every 48 hrs, and As Needed 11/11/20 1300   Non-staged Wound Description Full thickness 11/11/20 1300   Wound Length (cm) 7.8 cm 11/11/20 1300   Wound Width (cm) 2.6 cm 11/11/20 1300   Wound Depth (cm) 0.1 cm 11/11/20 1300   Wound Surface Area (cm^2) 20.28 cm^2 11/11/20 1300   Wound Volume (cm^3) 2.03 cm^3 11/11/20 1300   Post-Procedure Length (cm) 7.9 cm 11/11/20 1300   Post-Procedure Width (cm) 2.6 cm 11/11/20 1300   Post-Procedure Depth (cm) 0.1 cm 11/11/20 1300   Post-Procedure Surface Area (cm^2) 20.54 cm^2 11/11/20 1300   Post-Procedure Volume (cm^3) 2.05 cm^3 11/11/20 1300   Wound Healing % 96 11/11/20 1300   Wound Bed Granulation (%) 20 % 09/30/20 1400   Wound Bed Epithelium (%) 0 % 07/29/20 1450   Wound Bed Slough (%) 40 % 10/21/20 1413   Wound Bed Eschar (%) 0 % 07/29/20 1450   Wound Bed Granulation (%) - Post-Procedure 100 % 09/30/20 1400   Tunneling (cm) 0 cm 11/11/20 1300   Undermining (cm) 0 cm 11/11/20 1300   Wound Odor Mild;Foul 11/11/20 1300   Exposed Structures None 11/11/20 1300       Wound 06/05/20 Full Thickness Wound Ankle LLE distal posteromedial (Active)   Wound Image    11/11/20 1300   Site Assessment Yellow;[place] 11/11/20 1300   Periwound Assessment Dry;Edema;Other  (Comment) 11/11/20 1300   Margins Attached edges 11/11/20 1300   Closure Secondary intention 11/11/20 1300   Drainage Amount Large 11/11/20 1300   Drainage Description Yellow;Serous 11/11/20 1300   Treatments Cleansed;Topical Lidocaine;Provider debridement 11/11/20 1300   Wound Cleansing Foam Cleanser/Washcloth 11/11/20 1300   Periwound Protectant Barrier Paste;Skin Moisturizer;Stoma Powder 11/11/20 1300   Dressing Cleansing/Solutions Not Applicable 11/11/20 1300   Dressing Options Hydrofiber Silver;Hydrofiber;Absorbent Abdominal Pad;Compression Wrap Two Layer 11/11/20 1300   Dressing Options (Historical)  Hydrofera Blue Ready;Absorbent Abdominal Pad;Compression Wrap Two Layer 07/15/20 1430   Dressing Changed Changed 11/11/20 1300   Dressing Status Clean;Dry;Intact 11/11/20 1300   Dressing Change/Treatment Frequency Every 48 hrs, and As Needed 11/11/20 1300   Non-staged Wound Description Full thickness 11/11/20 1300   Wound Length (cm) 2 cm 11/11/20 1300   Wound Width (cm) 2.5 cm 11/11/20 1300   Wound Depth (cm) 0.1 cm 10/28/20 0900   Wound Surface Area (cm^2) 5 cm^2 11/11/20 1300   Wound Volume (cm^3) 0.38 cm^3 10/28/20 0900   Post-Procedure Length (cm) 2.3 cm 11/11/20 1300   Post-Procedure Width (cm) 2.5 cm 11/11/20 1300   Post-Procedure Depth (cm) 0.2 cm 11/11/20 1300   Post-Procedure Surface Area (cm^2) 5.75 cm^2 11/11/20 1300   Post-Procedure Volume (cm^3) 1.15 cm^3 11/11/20 1300   Wound Healing % 94 10/28/20 0900   Wound Bed Granulation (%) 10 % 09/30/20 1400   Wound Bed Epithelium (%) 0 % 07/29/20 1450   Wound Bed Slough (%) 50 % 10/21/20 1413   Wound Bed Eschar (%) 0 % 07/29/20 1450   Wound Bed Granulation (%) - Post-Procedure 100 % 09/30/20 1400   Tunneling (cm) 0 cm 11/11/20 1300   Undermining (cm) 0 cm 11/11/20 1300   Wound Odor None 11/11/20 1300   Exposed Structures None 11/11/20 1300       Wound 06/05/20 Full Thickness Wound Leg Left LLE posteromedial proximal (Active)   Wound Image    11/11/20 1300    Site Assessment Red;Yellow 11/11/20 1300   Periwound Assessment Dry;Edema;Other (Comment) 11/11/20 1300   Margins Attached edges 11/11/20 1300   Closure Secondary intention 11/11/20 1300   Drainage Amount Large 11/11/20 1300   Drainage Description Yellow;Serous 11/11/20 1300   Treatments Cleansed;Topical Lidocaine;Provider debridement 11/11/20 1300   Wound Cleansing Foam Cleanser/Washcloth 11/11/20 1300   Periwound Protectant Barrier Paste;Skin Moisturizer;Stoma Powder 11/11/20 1300   Dressing Cleansing/Solutions Not Applicable 11/11/20 1300   Dressing Options Hydrofiber Silver;Hydrofiber;Absorbent Abdominal Pad;Compression Wrap Two Layer 11/11/20 1300   Dressing Options (Historical)  Hydrofera Blue Ready;Absorbent Abdominal Pad;Compression Wrap Two Layer 07/15/20 1430   Dressing Changed Changed 11/11/20 1300   Dressing Status Clean;Dry;Intact 11/11/20 1300   Dressing Change/Treatment Frequency Every 48 hrs, and As Needed 11/11/20 1300   Non-staged Wound Description Full thickness 11/11/20 1300   Wound Length (cm) 6 cm 11/11/20 1300   Wound Width (cm) 6.6 cm 11/11/20 1300   Wound Depth (cm) 0.1 cm 11/11/20 1300   Wound Surface Area (cm^2) 39.6 cm^2 11/11/20 1300   Wound Volume (cm^3) 3.96 cm^3 11/11/20 1300   Post-Procedure Length (cm) 6.2 cm 11/11/20 1300   Post-Procedure Width (cm) 6.7 cm 11/11/20 1300   Post-Procedure Depth (cm) 0.1 cm 11/11/20 1300   Post-Procedure Surface Area (cm^2) 41.54 cm^2 11/11/20 1300   Post-Procedure Volume (cm^3) 4.15 cm^3 11/11/20 1300   Wound Healing % 48 11/11/20 1300   Wound Bed Granulation (%) 10 % 09/30/20 1400   Wound Bed Epithelium (%) 0 % 07/29/20 1450   Wound Bed Slough (%) 75 % 10/21/20 1413   Wound Bed Eschar (%) 0 % 07/29/20 1450   Wound Bed Granulation (%) - Post-Procedure 100 % 09/30/20 1400   Tunneling (cm) 0 cm 11/11/20 1300   Undermining (cm) 0 cm 11/11/20 1300   Wound Odor None 11/11/20 1300   Exposed Structures None 11/11/20 1300       Wound 10/28/20 Left dorsal  4th toe (Active)   Wound Image   11/11/20 1300   Site Assessment Dry;Black;Other (Comment) 11/11/20 1300   Periwound Assessment Intact 11/11/20 1300   Margins Attached edges 11/11/20 1300   Closure Open to air 11/11/20 1300   Drainage Amount None 11/11/20 1300   Drainage Description Serosanguineous 10/28/20 0900   Treatments Cleansed 11/11/20 1300   Wound Cleansing Foam Cleanser/Washcloth 11/11/20 1300   Periwound Protectant Not Applicable 11/11/20 1300   Dressing Cleansing/Solutions Not Applicable 11/11/20 1300   Dressing Options Open to Air 11/11/20 1300   Dressing Changed Changed 11/11/20 1300   Dressing Status Clean;Dry;Intact 11/11/20 1300   Dressing Change/Treatment Frequency As Needed 11/11/20 1300   Non-staged Wound Description Not applicable 11/11/20 1300   Wound Length (cm) 0.5 cm 11/11/20 1300   Wound Width (cm) 0.4 cm 11/11/20 1300   Wound Depth (cm) 0.1 cm 10/28/20 0900   Wound Surface Area (cm^2) 0.2 cm^2 11/11/20 1300   Wound Volume (cm^3) 0.06 cm^3 10/28/20 0900   Post-Procedure Length (cm) 0.6 cm 10/28/20 0900   Post-Procedure Width (cm) 0.3 cm 10/28/20 0900   Post-Procedure Depth (cm) 0.1 cm 10/28/20 0900   Post-Procedure Surface Area (cm^2) 0.18 cm^2 10/28/20 0900   Post-Procedure Volume (cm^3) 0.02 cm^3 10/28/20 0900   Tunneling (cm) 0 cm 10/28/20 0900   Undermining (cm) 0 cm 10/28/20 0900   Wound Odor None 11/11/20 1300   Exposed Structures None 10/28/20 0900                     PROCEDURE: Excisional debridement of right and left lower extremity wounds   -2% viscous lidocaine applied topically to wound beds for approximately 10 minutes prior to debridement  -Curette used to excise thick layer of slough from wound beds.  Excisional debridement was performed to remove devitalized tissue from all other wounds until healthy, bleeding tissue was visualized.  Total area debrided 67.83 cm² tissue debrided into the subcutaneous layer of all wounds.    -Bleeding controlled with manual pressure.     -Wounds cleansed with normal saline  -Patient tolerated the procedure well, without c/o pain or discomfort.         Pertinent Labs and Diagnostics:    Labs:     A1c:   Lab Results   Component Value Date/Time    HBA1C 5.7 (H) 2020 11:22 AM      Imagin/3/2020-MRI left tibia-fibula with and without contrast  IMPRESSION:     Moderate lateral leg cellulitis and underlying myositis     No abscess or osteomyelitis    VASCULAR STUDIES: BHUPINDER 2019   Right.    Doppler waveform of the common femoral artery is of high amplitude and    triphasic.    Doppler waveforms at the ankle are brisk and triphasic.    Ankle-brachial index is normal.       Left.    Could not perform ankle-brachial index due to large blisters/ulcers.   Normal toe-brachial index: 0.94   Doppler waveform of the common femoral artery is of high amplitude and    triphasic.    Biphasic waveforms seen at the ankle.     WOUND CULTURE:    DATE: 2020-culture collected in clinic  Culture Result  Abnormal     Beta Hemolytic Streptococcus group A   Heavy growth     Culture Result  Abnormal     Staphylococcus aureus   Moderate growth     Culture Result  Abnormal     Serratia marcescens   Moderate growth     Culture Result  Abnormal     Proteus mirabilis   Moderate growth     Culture Result  Abnormal     Pseudomonas species   Light growth   unable to isolate for sensitivity            ASSESSMENT AND PLAN:     1. Skin ulcer of left lower leg with fat layer exposed (HCC)  Comment: Patient has history of recurring ulcerations to left lower extremity.  Has undergone ablation procedures by Dr. White.  Discharged from [facility] in 2020 due to healing, returned approximately 3 weeks later much worse.  Hospitalized from  until 2020 underwent multiple surgical debridements.    2020: Skin denuded, heavy drainage.  Patient attributes this to going a long time between dressing changes last week.  -Excisional debridement of wounds in clinic  today, medically necessary to promote wound healing.  -Patient to return to clinic weekly for assessment and debridement  -Home health to change dressing 1-2 times per week in between clinic visits      Wound care: Hydrofiber silver, Hydrofiber, absorbent elbow pad, compression 2 layer wrap     2. Chronic venous stasis  Comments: Hemosiderin staining, brawny edema, scarring- findings consistent with CVI.  Pt established at [facility], underwent ablation procedures in 2019.     -Consider referral back to vascular if wounds failed to progress or deteriorate.    3. Acquired deformity of left ankle and foot  Comments: Charcot deformity of foot and ankle.  Very little ROM of ankle.  Patient was referred to orthopedics previously, however did not make appointment.    -Patient will need to follow-up with orthopedics once wounds are healed.    4.  Wound infection     11/11/2020: Increased drainage this week, mild odor.  -Continue to monitor for signs symptoms at each clinic visit        PATIENT EDUCATION  - Pt advised to go to ER for any increased redness, swelling, drainage or odor, or if he develops fever, chills, nausea or vomiting.      Please note that this note may have been created using voice recognition software. I have worked with technical experts from [employer] to optimize the interface.  I have made every reasonable attempt to correct obvious errors, but there may be errors of grammar and possibly content that I did not discover before finalizing the note.    N

## 2020-11-11 NOTE — LETTER
2020      Regarding: LosUMESH Chavez, sylvia 1955    Home Health Orders for [facility]:    Pt will continue to be seen at the wound clinic 1x/week.  Please change his dressing 2x/week at home, for a total of 3 dressing changes per week.    LLE wounds (anterolateral, posteromedial distal, posteromedial proximal):  1. Remove prior dressing.  2. Cleanse leg with wound cleanser, saline, or soap and water and pat dry.  3. Apply zinc barrier cream to immediate periwound.  4. Apply lotion to remainder of lower extremity skin.  5. Cover each wound with hydrofiber silver  6. Then plain alginate or plain hydrofiber  7. Then ABD pads (at least 2)  8. Apply 2 layer compression wrap.    Feel free to contact [facility] with any questions.  [phone].    Thank you,  KEV Mead R.N.

## 2020-11-18 ENCOUNTER — OFFICE VISIT (OUTPATIENT)
Dept: WOUND CARE | Facility: MEDICAL CENTER | Age: 65
End: 2020-11-18
Attending: NURSE PRACTITIONER
Payer: MEDICARE

## 2020-11-18 VITALS
DIASTOLIC BLOOD PRESSURE: 85 MMHG | HEART RATE: 67 BPM | RESPIRATION RATE: 18 BRPM | TEMPERATURE: 97.4 F | SYSTOLIC BLOOD PRESSURE: 133 MMHG | OXYGEN SATURATION: 96 %

## 2020-11-18 DIAGNOSIS — T14.8XXA WOUND INFECTION: ICD-10-CM

## 2020-11-18 DIAGNOSIS — I87.8 CHRONIC VENOUS STASIS: ICD-10-CM

## 2020-11-18 DIAGNOSIS — L97.922 SKIN ULCER OF LEFT LOWER LEG WITH FAT LAYER EXPOSED (HCC): Primary | ICD-10-CM

## 2020-11-18 DIAGNOSIS — M21.962 ACQUIRED DEFORMITY OF LEFT ANKLE AND FOOT: ICD-10-CM

## 2020-11-18 DIAGNOSIS — L08.9 WOUND INFECTION: ICD-10-CM

## 2020-11-18 DIAGNOSIS — L53.9 ERYTHEMA: ICD-10-CM

## 2020-11-18 PROCEDURE — 11045 DBRDMT SUBQ TISS EACH ADDL: CPT

## 2020-11-18 PROCEDURE — 87205 SMEAR GRAM STAIN: CPT

## 2020-11-18 PROCEDURE — 87070 CULTURE OTHR SPECIMN AEROBIC: CPT

## 2020-11-18 PROCEDURE — 99213 OFFICE O/P EST LOW 20 MIN: CPT | Mod: 25

## 2020-11-18 PROCEDURE — 11042 DBRDMT SUBQ TIS 1ST 20SQCM/<: CPT

## 2020-11-18 PROCEDURE — 87186 SC STD MICRODIL/AGAR DIL: CPT | Mod: 91

## 2020-11-18 PROCEDURE — 11045 DBRDMT SUBQ TISS EACH ADDL: CPT | Performed by: NURSE PRACTITIONER

## 2020-11-18 PROCEDURE — 99213 OFFICE O/P EST LOW 20 MIN: CPT | Mod: 25 | Performed by: NURSE PRACTITIONER

## 2020-11-18 PROCEDURE — 87077 CULTURE AEROBIC IDENTIFY: CPT | Mod: 91

## 2020-11-18 PROCEDURE — 11042 DBRDMT SUBQ TIS 1ST 20SQCM/<: CPT | Performed by: NURSE PRACTITIONER

## 2020-11-18 RX ORDER — DOXYCYCLINE HYCLATE 100 MG
100 TABLET ORAL 2 TIMES DAILY
Qty: 20 TAB | Refills: 0 | Status: SHIPPED | OUTPATIENT
Start: 2020-11-18 | End: 2020-11-28

## 2020-11-18 ASSESSMENT — ENCOUNTER SYMPTOMS
CLAUDICATION: 0
NAUSEA: 0
SHORTNESS OF BREATH: 0
VOMITING: 0
DIARRHEA: 0
NERVOUS/ANXIOUS: 0
COUGH: 0
CHILLS: 0
CONSTIPATION: 0
DEPRESSION: 1
FEVER: 0
MYALGIAS: 0

## 2020-11-18 NOTE — PROGRESS NOTES
Provider Encounter- Full Thickness wound    HISTORY OF PRESENT ILLNESS  Wound History:    START OF CARE IN CLINIC: 5/20/2020               REFERRING PROVIDER: KEV Keith                 WOUND ETIOLOGY: venous,  likely mixed etiology              LOCATION: Left lower leg, multiple wounds                                   Right lower leg, multiple wounds-first observed in clinic on initial evaluation, 6/5/2020.  Resolved 9/2/2020                HISTORY: Patient is very well-known to this clinic from previous treatment of wounds to his left lower extremity.  He was discharged from the clinic in March 2020 due to full resolution of his wound.  He returned to the clinic approximately 3 weeks later, on 4/16, with cellulitis, edema, and large open wounds to his left lower leg, and was sent directly to [facility] emergency room.  He was admitted for IV antibiotics and surgical intervention.  On 4/22/2020, he underwent an I&D of his left lower extremity, fasciotomy, and VAC placement.  In the following weeks he underwent surgery 3 more times for irrigation and debridement.  He was discharged home on 5/21, on the VAC, with home health and a referral to [facility].       Pertinent Medical History:  Anxiety, Charcot's joint of left foot, non-diabetic (3/21/2016), Chronic congestive heart failure (HCC) (11/16/2017), Hepatitis C, chronic (HCC), Hypertension, Migraine, Polysubstance abuse (HCC) (3/8/2018), Tobacco use (4/18/2016), Ulcer of left lower extremity with necrosis of muscle (HCC) (3/21/2016), and Venous stasis ulcer ([facility]) (2017).                ETIOLOGY HISTORY:  Vascular Surgeon: Dr. White. Compression Circ-aid. Varicose Veins none visible     TOBACCO USE: Patient denies; patient also denies alcohol and recreational drug use    Patient's problem list, allergies, and current medications reviewed and updated in Epic    Interval History:  6/5/2020: Initial clinic visit with KEV Brantley.  Patient  "returns to clinic after a long hospitalization.  The VAC was held by [facility] due to severe maceration and deterioration of wound beds.  He also presents today with new wounds to his right lower extremity.  He states he does not know how or when these occurred, states, \"they just popped up\".  He is uncertain whether or not he has follow-up appointments with his surgeon.  He does not know what medications he is on.      6/12/2020 : Clinic visit with KEV Brantley.  Goran states he is feeling well today,denies fevers, chills, nausea, vomiting, cough or shortness of breath.  Condition of his periwound skin is significantly improved.  I would like to restart VAC, however he did not bring the VAC with him, and that has already been on for 2-1/2 weeks.  Will likely need to be reordered.  [facility] continues to see him several times per week for dressing changes.  He is tolerating compression without any difficulty.    6/19/2020 : Clinic visit with KEV Brantley.   Goran states he is feeling well today,denies fevers, chills, nausea, vomiting, cough or shortness of breath.  He brought his VAC with him today.  However,  too much slough on wound beds to consider re-applying.  [facility] continues to see him 2 times per week.  I have asked him to bring the VAC with him again next week, and will hopefully place it at that time.  Per patient, dressings are saturated between dressing changes.    6/26/2020 : Clinic visit with KEV Brantley.   Goran states he is feeling well today,denies fevers, chills, nausea, vomiting, cough or shortness of breath.  He brought his VAC with him again today, however we are not able to put it on as he is still having twice drainage.  Sensitivities from culture still pending.  However I did start him on Augmentin today which we are able to obtain from [facility] prior to his leaving the clinic today and had him take one before he left.  [facility] is been seen 2 times per week " in between clinic visits.  We will try and get them to see him over the weekend to do an additional dressing change.  Will adjust antibiotics once sensitivities are known.  Hopefully start VAC next clinic visit.    7/2/2020 : Clinic visit with KEV Brantley.    Goran states he is feeling well today,denies fevers, chills, nausea, vomiting, cough or shortness of breath.  His VAC is here in the clinic, however he does not have another home health appointment until Monday, 4 days from now.  He has been taking Augmentin as prescribed last week.  I informed him that I added Bactrim DS after I got sensitivity results, and that it should be waiting for him at his pharmacy.    7/9/2020: Clinic visit with KEV Luna. Patient states that they are feeling well today.  Patient denies fever, chills, nausea, vomiting, lightheadedness, dizziness, shortness of breath and chest pain.  All wound beds debrided in clinic today patient's wounds continue to progress beefy red granulation tissue to left lower extremity lateral wound tendon is being covered with granulation tissue continue with wound VAC and current plan of care.    7/15/2020 : Clinic visit with KEV Brantley.  Goran states he is feeling well today,denies fevers, chills, nausea, vomiting, cough or shortness of breath.  Home health continues to see him in between clinic visits.  He states he has not been smoking, drinking or using any street drugs.    7/22/2020 : Clinic visit with KEV Brantley.   Goran states he is feeling well today,denies fevers, chills, nausea, vomiting, cough or shortness of breath.  Home health continues to see him in between clinic visits.    7/29/2020: Clinic visit with KEV Luna. Patient states that they are feeling well today.  Patient denies fever, chills, nausea, vomiting, lightheadedness, dizziness, shortness of breath and chest pain.    8/5/2020: Clinic visit with KEV Luna. Patient  states that they are feeling well today.  Patient denies fever, chills, nausea, vomiting, lightheadedness, dizziness, shortness of breath and chest pain.  Patient's wounds are foul-smelling today.  Patient had positive culture we have already ordered the patient Cipro and Augmentin patient reports that he got my message from last night.  Patient reports that he is can  antibiotics tonight in the ED initiate them.  Patient has been referred to infectious disease due to multiple bacterial growth and for their expert opinion.    8/20/2020 : Clinic visit with KEV Brantley.  Goran states he is feeling well, denies fevers, chills, nausea, vomiting, cough or shortness of breath.  He states he has not yet heard from ID.  However, review of referral note indicates that a message was left on his phone earlier today.  Kodi states he will check his message and will call them back.  He completed his antibiotics 2 days ago.    9/2/2020 : Clinic visit with KEV Brantley.   Goran states he is feeling well, denies fevers, chills, nausea, vomiting, cough or shortness of breath.  He was seen by ID yesterday, no new antibiotics ordered, however MRI ordered to assess for OM.  Goran has an appoint with his PCP tomorrow.    9/9/2020 : Clinic visit with KEV Brantley.  Goran states he is feeling well, denies fevers, chills, nausea, vomiting, cough or shortness of breath.  Reviewed MRI results with him in clinic today, no evidence of OM.    9/16/2020 : Clinic visit with KEV Brantley.  Goran presents today looking more disheveled than usual, he wore sunglasses throughout the visit.  His speech is a bit pressured and disorganized.  He denies using any street drugs or drinking alcohol.  His dressings are saturated, and his wounds have deteriorated significantly.  He denies fevers, chills, nausea, vomiting, cough or shortness of breath.  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  Home health continues to see him in between clinic visits for dressing changes.    9/30/2020 : Clinic visit with KEV Brantley.  Goran states he is feeling well today, denies fevers, chills, nausea, vomiting, cough or shortness of breath.  He missed his appointment last week, arrived too late and could not be seen.  He is again wearing his sunglasses throughout today's visit.  He denies using any street drugs or drinking alcohol.  Home health continues to see him in between clinic visits.    10/9/2020: Clinic visit with Abeba ANGULO. Pt denies fevers, chills, nausea, vomiting. Followed by HH. Presents with foul smelling fluorescent green/yellow drainage from wounds and on dressings.     10/21/2020 : Clinic visit with KEV Brantley.  Goran states he is feeling well today, denies fevers, chills, nausea, vomiting, cough or shortness of breath.  He completed his antibiotics a few days ago.  He has not been showering with his wound open, states his house has some, \"plumbing issues\".  Wound has been treated with daily Dakin's moistened gauze.  Patient has been able to change dressing in between clinic visits.    10/28/2020: Clinic visit with KEV Luna. Patient states that they are feeling well today.  Patient denies fever, chills, nausea, vomiting, lightheadedness, dizziness, shortness of breath and chest pain.  We will hold Dakin's application for 1 week.  Left anterior lower extremity wound is progressing however slowly, appears to have red beefy granulation tissue forming.  Medial and posterior wounds continue to wax and wane.      11/11/2020 : Clinic visit with KEV Brantley.  Goran states he is feeling well today, denies fevers, chills, nausea, vomiting, cough or shortness of breath.  Normally home health sees him 2 times per week in between clinic visits.  However, one of his appointments " was canceled due to illness of home health RN, and he went 5 days in between dressing changes.  He states that during this time the drainage and odor increase significantly.       11/18/2020 : Clinic visit with KEV Brantley.  Goran states that he is feeling well today, denies fevers, chills, nausea, vomiting, cough or shortness of breath.  Denies increasing pain in his left lower extremity states that he noticed more drainage over the past several days.  Home health continues to see him 2 times per week between clinic visits.        REVIEW OF SYSTEMS:   Review of Systems   Constitutional: Negative for chills and fever.        States he is eating well, appetite normal   Respiratory: Negative for cough and shortness of breath.    Cardiovascular: Positive for leg swelling. Negative for chest pain and claudication.        Chronic swelling in legs, for several years   Gastrointestinal: Negative for constipation, diarrhea, nausea and vomiting.   Genitourinary: Negative for dysuria.   Musculoskeletal: Positive for joint pain. Negative for myalgias.        Chronic   Psychiatric/Behavioral: Positive for depression. The patient is not nervous/anxious.        PHYSICAL EXAMINATION:   /85   Pulse 67   Temp 36.3 °C (97.4 °F) (Temporal)   Resp 18   SpO2 96%     Physical Exam   Constitutional: He is oriented to person, place, and time and well-developed, well-nourished, and in no distress.   HENT:   Head: Normocephalic and atraumatic.   Eyes: Pupils are equal, round, and reactive to light. Conjunctivae and EOM are normal.   Neck: Neck supple.   Cardiovascular: Intact distal pulses.   Pulmonary/Chest: Effort normal.   Musculoskeletal: Normal range of motion.         General: Deformity and edema present.      Comments: Left charcot foot and ankle  1+pitting edema to bilateral LE   Neurological: He is alert and oriented to person, place, and time.   Skin: Skin is warm. There is erythema.   Large full-thickness wounds  to left lower extremities  Refer to wound flowsheet and photos     Psychiatric: Mood, affect and judgment normal.       WOUND ASSESSMENT        Wound 06/05/20 Full Thickness Wound Leg Left LLE anterolateral (Active)   Wound Image   11/18/20 1315   Site Assessment Red;Yellow 11/18/20 1315   Periwound Assessment Denuded;Non-blanchable erythema;Edema 11/18/20 1315   Margins Attached edges 11/11/20 1300   Closure Secondary intention 11/11/20 1300   Drainage Amount Large 11/11/20 1300   Drainage Description Serous 11/11/20 1300   Treatments Cleansed;Topical Lidocaine;Provider debridement 11/11/20 1300   Wound Cleansing Foam Cleanser/Washcloth 11/11/20 1300   Periwound Protectant Barrier Paste;Skin Moisturizer;Stoma Powder 11/11/20 1300   Dressing Cleansing/Solutions Not Applicable 10/28/20 0900   Dressing Options Hydrofiber Silver;Hydrofiber;Absorbent Abdominal Pad;Compression Wrap Two Layer 11/11/20 1300   Dressing Options (Historical)  Wound Vac;Compression Wrap Two Layer 07/15/20 1430   Dressing Changed Changed 11/11/20 1300   Dressing Status Clean;Dry;Intact 11/11/20 1300   Dressing Change/Treatment Frequency Every 48 hrs, and As Needed 11/11/20 1300   Non-staged Wound Description Full thickness 11/18/20 1315   Wound Length (cm) 7.2 cm 11/18/20 1315   Wound Width (cm) 2.4 cm 11/18/20 1315   Wound Depth (cm) 0.1 cm 11/18/20 1315   Wound Surface Area (cm^2) 17.28 cm^2 11/18/20 1315   Wound Volume (cm^3) 1.73 cm^3 11/18/20 1315   Post-Procedure Length (cm) 7.2 cm 11/18/20 1315   Post-Procedure Width (cm) 2.5 cm 11/18/20 1315   Post-Procedure Depth (cm) 0.1 cm 11/18/20 1315   Post-Procedure Surface Area (cm^2) 18 cm^2 11/18/20 1315   Post-Procedure Volume (cm^3) 1.8 cm^3 11/18/20 1315   Wound Healing % 97 11/18/20 1315   Wound Bed Granulation (%) 20 % 09/30/20 1400   Wound Bed Epithelium (%) 0 % 07/29/20 1450   Wound Bed Slough (%) 40 % 10/21/20 1413   Wound Bed Eschar (%) 0 % 07/29/20 1450   Wound Bed Granulation (%) -  Post-Procedure 100 % 09/30/20 1400   Tunneling (cm) 0 cm 11/11/20 1300   Undermining (cm) 0 cm 11/11/20 1300   Wound Odor Mild;Foul 11/11/20 1300   Exposed Structures None 11/11/20 1300       Wound 06/05/20 Full Thickness Wound Ankle LLE distal posteromedial (Active)   Wound Image   11/18/20 1315   Site Assessment Red;Yellow 11/18/20 1315   Periwound Assessment Dry;Edema;Other (Comment) 11/11/20 1300   Margins Attached edges 11/11/20 1300   Closure Secondary intention 11/11/20 1300   Drainage Amount Large 11/11/20 1300   Drainage Description Yellow;Serous 11/11/20 1300   Treatments Cleansed;Topical Lidocaine;Provider debridement 11/11/20 1300   Wound Cleansing Foam Cleanser/Washcloth 11/11/20 1300   Periwound Protectant Barrier Paste;Skin Moisturizer;Stoma Powder 11/11/20 1300   Dressing Cleansing/Solutions Not Applicable 11/11/20 1300   Dressing Options Hydrofiber Silver;Hydrofiber;Absorbent Abdominal Pad;Compression Wrap Two Layer 11/11/20 1300   Dressing Options (Historical)  Hydrofera Blue Ready;Absorbent Abdominal Pad;Compression Wrap Two Layer 07/15/20 1430   Dressing Changed Changed 11/11/20 1300   Dressing Status Clean;Dry;Intact 11/11/20 1300   Dressing Change/Treatment Frequency Every 48 hrs, and As Needed 11/11/20 1300   Non-staged Wound Description Full thickness 11/18/20 1315   Wound Length (cm) 1.1 cm 11/18/20 1315   Wound Width (cm) 4.5 cm 11/18/20 1315   Wound Depth (cm) 0.2 cm 11/18/20 1315   Wound Surface Area (cm^2) 4.95 cm^2 11/18/20 1315   Wound Volume (cm^3) 0.99 cm^3 11/18/20 1315   Post-Procedure Length (cm) 1.3 cm 11/18/20 1315   Post-Procedure Width (cm) 4.5 cm 11/18/20 1315   Post-Procedure Depth (cm) 0.3 cm 11/18/20 1315   Post-Procedure Surface Area (cm^2) 5.85 cm^2 11/18/20 1315   Post-Procedure Volume (cm^3) 1.76 cm^3 11/18/20 1315   Wound Healing % 83 11/18/20 1315   Wound Bed Granulation (%) 10 % 09/30/20 1400   Wound Bed Epithelium (%) 0 % 07/29/20 1450   Wound Bed Slough (%) 50 %  10/21/20 1413   Wound Bed Eschar (%) 0 % 07/29/20 1450   Wound Bed Granulation (%) - Post-Procedure 100 % 09/30/20 1400   Tunneling (cm) 0 cm 11/11/20 1300   Undermining (cm) 0 cm 11/11/20 1300   Wound Odor None 11/11/20 1300   Exposed Structures None 11/11/20 1300       Wound 06/05/20 Full Thickness Wound Leg Left LLE posteromedial proximal (Active)   Wound Image   11/18/20 1315   Site Assessment Red;Yellow 11/18/20 1315   Periwound Assessment Denuded;Non-blanchable erythema;Edema 11/18/20 1315   Margins Attached edges 11/11/20 1300   Closure Secondary intention 11/11/20 1300   Drainage Amount Large 11/11/20 1300   Drainage Description Yellow;Serous 11/11/20 1300   Treatments Cleansed;Topical Lidocaine;Provider debridement 11/11/20 1300   Wound Cleansing Foam Cleanser/Washcloth 11/11/20 1300   Periwound Protectant Barrier Paste;Skin Moisturizer;Stoma Powder 11/11/20 1300   Dressing Cleansing/Solutions Not Applicable 11/11/20 1300   Dressing Options Hydrofiber Silver;Hydrofiber;Absorbent Abdominal Pad;Compression Wrap Two Layer 11/11/20 1300   Dressing Options (Historical)  Hydrofera Blue Ready;Absorbent Abdominal Pad;Compression Wrap Two Layer 07/15/20 1430   Dressing Changed Changed 11/11/20 1300   Dressing Status Clean;Dry;Intact 11/11/20 1300   Dressing Change/Treatment Frequency Every 48 hrs, and As Needed 11/11/20 1300   Non-staged Wound Description Full thickness 11/18/20 1315   Wound Length (cm) 6.3 cm 11/18/20 1315   Wound Width (cm) 8 cm 11/18/20 1315   Wound Depth (cm) 0.2 cm 11/18/20 1315   Wound Surface Area (cm^2) 50.4 cm^2 11/18/20 1315   Wound Volume (cm^3) 10.08 cm^3 11/18/20 1315   Post-Procedure Length (cm) 6.5 cm 11/18/20 1315   Post-Procedure Width (cm) 8 cm 11/18/20 1315   Post-Procedure Depth (cm) 0.3 cm 11/18/20 1315   Post-Procedure Surface Area (cm^2) 52 cm^2 11/18/20 1315   Post-Procedure Volume (cm^3) 15.6 cm^3 11/18/20 1315   Wound Healing % -33 11/18/20 1315   Wound Bed Granulation (%)  10 % 09/30/20 1400   Wound Bed Epithelium (%) 0 % 07/29/20 1450   Wound Bed Slough (%) 75 % 10/21/20 1413   Wound Bed Eschar (%) 0 % 07/29/20 1450   Wound Bed Granulation (%) - Post-Procedure 100 % 09/30/20 1400   Tunneling (cm) 0 cm 11/11/20 1300   Undermining (cm) 0 cm 11/11/20 1300   Wound Odor None 11/11/20 1300   Exposed Structures None 11/11/20 1300       Wound 10/28/20 Left dorsal 4th toe (Active)   Wound Image   11/18/20 1315   Site Assessment Dry;Black 11/18/20 1315   Periwound Assessment Intact 11/18/20 1315   Margins Attached edges 11/18/20 1315   Closure Open to air 11/11/20 1300   Drainage Amount None 11/18/20 1315   Drainage Description Serosanguineous 10/28/20 0900   Treatments Cleansed 11/11/20 1300   Wound Cleansing Foam Cleanser/Washcloth 11/11/20 1300   Periwound Protectant Not Applicable 11/11/20 1300   Dressing Cleansing/Solutions Not Applicable 11/11/20 1300   Dressing Options Open to Air 11/11/20 1300   Dressing Changed Changed 11/11/20 1300   Dressing Status Clean;Dry;Intact 11/11/20 1300   Dressing Change/Treatment Frequency As Needed 11/11/20 1300   Non-staged Wound Description Not applicable 11/11/20 1300   Wound Length (cm) 0.5 cm 11/11/20 1300   Wound Width (cm) 0.4 cm 11/11/20 1300   Wound Depth (cm) 0.1 cm 10/28/20 0900   Wound Surface Area (cm^2) 0.2 cm^2 11/11/20 1300   Wound Volume (cm^3) 0.06 cm^3 10/28/20 0900   Post-Procedure Length (cm) 0.6 cm 10/28/20 0900   Post-Procedure Width (cm) 0.3 cm 10/28/20 0900   Post-Procedure Depth (cm) 0.1 cm 10/28/20 0900   Post-Procedure Surface Area (cm^2) 0.18 cm^2 10/28/20 0900   Post-Procedure Volume (cm^3) 0.02 cm^3 10/28/20 0900   Tunneling (cm) 0 cm 10/28/20 0900   Undermining (cm) 0 cm 10/28/20 0900   Wound Odor None 11/11/20 1300   Exposed Structures None 10/28/20 0900             PROCEDURE: Excisional debridement of left lower extremity wounds   -2% viscous lidocaine applied topically to wound beds for approximately 10 minutes prior to  debridement  -Curette used to excise thick layer of slough from wound beds.  Excisional debridement was performed to remove devitalized tissue from all other wounds until healthy, bleeding tissue was visualized.  Total area debrided 76.03 cm² tissue debrided into the subcutaneous layer of all wounds.    -Bleeding controlled with manual pressure.    -Wounds cleansed with normal saline  -Patient tolerated the procedure well, without c/o pain or discomfort.         Pertinent Labs and Diagnostics:    Labs:     A1c:   Lab Results   Component Value Date/Time    HBA1C 5.7 (H) 2020 11:22 AM      Imagin/3/2020-MRI left tibia-fibula with and without contrast  IMPRESSION:     Moderate lateral leg cellulitis and underlying myositis     No abscess or osteomyelitis    VASCULAR STUDIES: BHUPINDER 2019   Right.    Doppler waveform of the common femoral artery is of high amplitude and    triphasic.    Doppler waveforms at the ankle are brisk and triphasic.    Ankle-brachial index is normal.       Left.    Could not perform ankle-brachial index due to large blisters/ulcers.   Normal toe-brachial index: 0.94   Doppler waveform of the common femoral artery is of high amplitude and    triphasic.    Biphasic waveforms seen at the ankle.     WOUND CULTURE:    2020: Culture collected in clinic          ASSESSMENT AND PLAN:     1. Skin ulcer of left lower leg with fat layer exposed (HCC)  Comment: Patient has history of recurring ulcerations to left lower extremity.  Has undergone ablation procedures by Dr. White.  Discharged from [facility] in 2020 due to healing, returned approximately 3 weeks later much worse.  Hospitalized from  until 2020 underwent multiple surgical debridements.    2020: Skin denuded, heavy drainage. Dressing saturated through coban layer, no appreciable odor.   -Excisional debridement of wounds in clinic today, medically necessary to promote wound healing.  -Patient to return to  clinic weekly for assessment and debridement  -Home health to change dressing 1-2 times per week in between clinic visits      Wound care: Hydrofiber silver, Hydrofiber, absorbent elbow pad, compression 2 layer wrap     2. Chronic venous stasis  Comments: Hemosiderin staining, brawny edema, scarring- findings consistent with CVI.  Pt established at [facility], underwent ablation procedures in 2019.     -Consider referral back to vascular if wounds failed to progress or deteriorate.    3. Acquired deformity of left ankle and foot  Comments: Charcot deformity of foot and ankle.  Very little ROM of ankle.  Patient was referred to orthopedics previously, however did not make appointment.    -Patient will need to follow-up with orthopedics once wounds are healed.    4.  Wound infection     11/18/2020: Increased drainage this week, no odor. Will obtain a culture in clinic today and start a course of doxycycline. Will follow results and adjust medication therapy as needed.   -Continue to monitor for signs symptoms at each clinic visit    Patient was seen for 15 minutes face to face of which > 50% of appointment time was spent on counseling and coordination of care regarding the above.      PATIENT EDUCATION  - Pt advised to go to ER for any increased redness, swelling, drainage or odor, or if he develops fever, chills, nausea or vomiting.      Please note that this note may have been created using voice recognition software. I have worked with technical experts from [employer] to optimize the interface.  I have made every reasonable attempt to correct obvious errors, but there may be errors of grammar and possibly content that I did not discover before finalizing the note.    N

## 2020-11-19 LAB
GRAM STN SPEC: NORMAL
SIGNIFICANT IND 70042: NORMAL
SITE SITE: NORMAL
SOURCE SOURCE: NORMAL

## 2020-11-25 ENCOUNTER — OFFICE VISIT (OUTPATIENT)
Dept: WOUND CARE | Facility: MEDICAL CENTER | Age: 65
End: 2020-11-25
Attending: NURSE PRACTITIONER
Payer: MEDICARE

## 2020-11-25 ENCOUNTER — TELEPHONE (OUTPATIENT)
Dept: MEDICAL GROUP | Facility: MEDICAL CENTER | Age: 65
End: 2020-11-25

## 2020-11-25 VITALS
TEMPERATURE: 98.3 F | HEART RATE: 113 BPM | SYSTOLIC BLOOD PRESSURE: 134 MMHG | DIASTOLIC BLOOD PRESSURE: 98 MMHG | OXYGEN SATURATION: 93 % | RESPIRATION RATE: 20 BRPM

## 2020-11-25 DIAGNOSIS — Z23 NEED FOR VACCINATION: ICD-10-CM

## 2020-11-25 DIAGNOSIS — L97.922 SKIN ULCER OF LEFT LOWER LEG WITH FAT LAYER EXPOSED (HCC): ICD-10-CM

## 2020-11-25 DIAGNOSIS — L08.9 WOUND INFECTION: ICD-10-CM

## 2020-11-25 DIAGNOSIS — I87.8 CHRONIC VENOUS STASIS: ICD-10-CM

## 2020-11-25 DIAGNOSIS — T14.8XXA WOUND INFECTION: ICD-10-CM

## 2020-11-25 DIAGNOSIS — L97.921 ULCER OF LEFT LOWER EXTREMITY, LIMITED TO BREAKDOWN OF SKIN (HCC): ICD-10-CM

## 2020-11-25 DIAGNOSIS — M21.962 ACQUIRED DEFORMITY OF LEFT ANKLE AND FOOT: ICD-10-CM

## 2020-11-25 PROCEDURE — 11042 DBRDMT SUBQ TIS 1ST 20SQCM/<: CPT | Performed by: NURSE PRACTITIONER

## 2020-11-25 PROCEDURE — 99213 OFFICE O/P EST LOW 20 MIN: CPT | Mod: 25

## 2020-11-25 PROCEDURE — 11042 DBRDMT SUBQ TIS 1ST 20SQCM/<: CPT

## 2020-11-25 PROCEDURE — 11045 DBRDMT SUBQ TISS EACH ADDL: CPT

## 2020-11-25 PROCEDURE — 11045 DBRDMT SUBQ TISS EACH ADDL: CPT | Performed by: NURSE PRACTITIONER

## 2020-11-25 PROCEDURE — 99213 OFFICE O/P EST LOW 20 MIN: CPT | Mod: 25 | Performed by: NURSE PRACTITIONER

## 2020-11-25 RX ORDER — AMOXICILLIN 250 MG/1
500 CAPSULE ORAL 3 TIMES DAILY
Qty: 42 CAP | Refills: 0 | Status: SHIPPED | OUTPATIENT
Start: 2020-11-25 | End: 2020-12-02

## 2020-11-25 ASSESSMENT — ENCOUNTER SYMPTOMS
VOMITING: 0
SHORTNESS OF BREATH: 0
FEVER: 0
CHILLS: 0
NERVOUS/ANXIOUS: 0
CLAUDICATION: 0
COUGH: 0
DEPRESSION: 1
MYALGIAS: 0
CONSTIPATION: 0
DIARRHEA: 0
NAUSEA: 0

## 2020-11-25 NOTE — PROGRESS NOTES
Provider Encounter- Full Thickness wound    HISTORY OF PRESENT ILLNESS  Wound History:    START OF CARE IN CLINIC: 5/20/2020               REFERRING PROVIDER: KEV Keith                 WOUND ETIOLOGY: venous,  likely mixed etiology              LOCATION: Left lower leg, multiple wounds                                   Right lower leg, multiple wounds-first observed in clinic on initial evaluation, 6/5/2020.  Resolved 9/2/2020                HISTORY: Patient is very well-known to this clinic from previous treatment of wounds to his left lower extremity.  He was discharged from the clinic in March 2020 due to full resolution of his wound.  He returned to the clinic approximately 3 weeks later, on 4/16, with cellulitis, edema, and large open wounds to his left lower leg, and was sent directly to [facility] emergency room.  He was admitted for IV antibiotics and surgical intervention.  On 4/22/2020, he underwent an I&D of his left lower extremity, fasciotomy, and VAC placement.  In the following weeks he underwent surgery 3 more times for irrigation and debridement.  He was discharged home on 5/21, on the VAC, with home health and a referral to [facility].       Pertinent Medical History:  Anxiety, Charcot's joint of left foot, non-diabetic (3/21/2016), Chronic congestive heart failure (HCC) (11/16/2017), Hepatitis C, chronic (HCC), Hypertension, Migraine, Polysubstance abuse (HCC) (3/8/2018), Tobacco use (4/18/2016), Ulcer of left lower extremity with necrosis of muscle (HCC) (3/21/2016), and Venous stasis ulcer ([facility]) (2017).                ETIOLOGY HISTORY:  Vascular Surgeon: Dr. White. Compression Circ-aid. Varicose Veins none visible     TOBACCO USE: Patient denies; patient also denies alcohol and recreational drug use    Patient's problem list, allergies, and current medications reviewed and updated in Epic    Interval History:  6/5/2020: Initial clinic visit with KEV Brantley.  Patient  "returns to clinic after a long hospitalization.  The VAC was held by [facility] due to severe maceration and deterioration of wound beds.  He also presents today with new wounds to his right lower extremity.  He states he does not know how or when these occurred, states, \"they just popped up\".  He is uncertain whether or not he has follow-up appointments with his surgeon.  He does not know what medications he is on.      6/12/2020 : Clinic visit with KEV Brantley.  Goran states he is feeling well today,denies fevers, chills, nausea, vomiting, cough or shortness of breath.  Condition of his periwound skin is significantly improved.  I would like to restart VAC, however he did not bring the VAC with him, and that has already been on for 2-1/2 weeks.  Will likely need to be reordered.  [facility] continues to see him several times per week for dressing changes.  He is tolerating compression without any difficulty.    6/19/2020 : Clinic visit with KEV Brantley.   Goran states he is feeling well today,denies fevers, chills, nausea, vomiting, cough or shortness of breath.  He brought his VAC with him today.  However,  too much slough on wound beds to consider re-applying.  [facility] continues to see him 2 times per week.  I have asked him to bring the VAC with him again next week, and will hopefully place it at that time.  Per patient, dressings are saturated between dressing changes.    6/26/2020 : Clinic visit with KEV Brantley.   Goran states he is feeling well today,denies fevers, chills, nausea, vomiting, cough or shortness of breath.  He brought his VAC with him again today, however we are not able to put it on as he is still having twice drainage.  Sensitivities from culture still pending.  However I did start him on Augmentin today which we are able to obtain from [facility] prior to his leaving the clinic today and had him take one before he left.  [facility] is been seen 2 times per week " in between clinic visits.  We will try and get them to see him over the weekend to do an additional dressing change.  Will adjust antibiotics once sensitivities are known.  Hopefully start VAC next clinic visit.    7/2/2020 : Clinic visit with KEV Brantley.    Goran states he is feeling well today,denies fevers, chills, nausea, vomiting, cough or shortness of breath.  His VAC is here in the clinic, however he does not have another home health appointment until Monday, 4 days from now.  He has been taking Augmentin as prescribed last week.  I informed him that I added Bactrim DS after I got sensitivity results, and that it should be waiting for him at his pharmacy.    7/9/2020: Clinic visit with KEV Luna. Patient states that they are feeling well today.  Patient denies fever, chills, nausea, vomiting, lightheadedness, dizziness, shortness of breath and chest pain.  All wound beds debrided in clinic today patient's wounds continue to progress beefy red granulation tissue to left lower extremity lateral wound tendon is being covered with granulation tissue continue with wound VAC and current plan of care.    7/15/2020 : Clinic visit with KEV Brantley.  Goran states he is feeling well today,denies fevers, chills, nausea, vomiting, cough or shortness of breath.  Home health continues to see him in between clinic visits.  He states he has not been smoking, drinking or using any street drugs.    7/22/2020 : Clinic visit with KEV Brantley.   Goran states he is feeling well today,denies fevers, chills, nausea, vomiting, cough or shortness of breath.  Home health continues to see him in between clinic visits.    7/29/2020: Clinic visit with KEV Luna. Patient states that they are feeling well today.  Patient denies fever, chills, nausea, vomiting, lightheadedness, dizziness, shortness of breath and chest pain.    8/5/2020: Clinic visit with KEV Luna. Patient  states that they are feeling well today.  Patient denies fever, chills, nausea, vomiting, lightheadedness, dizziness, shortness of breath and chest pain.  Patient's wounds are foul-smelling today.  Patient had positive culture we have already ordered the patient Cipro and Augmentin patient reports that he got my message from last night.  Patient reports that he is can  antibiotics tonight in the ED initiate them.  Patient has been referred to infectious disease due to multiple bacterial growth and for their expert opinion.    8/20/2020 : Clinic visit with KEV Brantley.  Goran states he is feeling well, denies fevers, chills, nausea, vomiting, cough or shortness of breath.  He states he has not yet heard from ID.  However, review of referral note indicates that a message was left on his phone earlier today.  Kodi states he will check his message and will call them back.  He completed his antibiotics 2 days ago.    9/2/2020 : Clinic visit with KEV Brantley.   Goran states he is feeling well, denies fevers, chills, nausea, vomiting, cough or shortness of breath.  He was seen by ID yesterday, no new antibiotics ordered, however MRI ordered to assess for OM.  Goran has an appoint with his PCP tomorrow.    9/9/2020 : Clinic visit with KEV Brantley.  Goran states he is feeling well, denies fevers, chills, nausea, vomiting, cough or shortness of breath.  Reviewed MRI results with him in clinic today, no evidence of OM.    9/16/2020 : Clinic visit with KEV Brantley.  Goran presents today looking more disheveled than usual, he wore sunglasses throughout the visit.  His speech is a bit pressured and disorganized.  He denies using any street drugs or drinking alcohol.  His dressings are saturated, and his wounds have deteriorated significantly.  He denies fevers, chills, nausea, vomiting, cough or shortness of breath.  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  Home health continues to see him in between clinic visits for dressing changes.    9/30/2020 : Clinic visit with KEV Brantley.  Goran states he is feeling well today, denies fevers, chills, nausea, vomiting, cough or shortness of breath.  He missed his appointment last week, arrived too late and could not be seen.  He is again wearing his sunglasses throughout today's visit.  He denies using any street drugs or drinking alcohol.  Home health continues to see him in between clinic visits.    10/9/2020: Clinic visit with Abeba ANGULO. Pt denies fevers, chills, nausea, vomiting. Followed by HH. Presents with foul smelling fluorescent green/yellow drainage from wounds and on dressings.     10/21/2020 : Clinic visit with KEV Brantley.  Goran states he is feeling well today, denies fevers, chills, nausea, vomiting, cough or shortness of breath.  He completed his antibiotics a few days ago.  He has not been showering with his wound open, states his house has some, \"plumbing issues\".  Wound has been treated with daily Dakin's moistened gauze.  Patient has been able to change dressing in between clinic visits.    10/28/2020: Clinic visit with KEV Luna. Patient states that they are feeling well today.  Patient denies fever, chills, nausea, vomiting, lightheadedness, dizziness, shortness of breath and chest pain.  We will hold Dakin's application for 1 week.  Left anterior lower extremity wound is progressing however slowly, appears to have red beefy granulation tissue forming.  Medial and posterior wounds continue to wax and wane.      11/11/2020 : Clinic visit with KEV Brantley.  Goran states he is feeling well today, denies fevers, chills, nausea, vomiting, cough or shortness of breath.  Normally home health sees him 2 times per week in between clinic visits.  However, one of his appointments " was canceled due to illness of home health RN, and he went 5 days in between dressing changes.  He states that during this time the drainage and odor increase significantly.       11/18/2020 : Clinic visit with KEV Brantley.  Goran states that he is feeling well today, denies fevers, chills, nausea, vomiting, cough or shortness of breath.  Denies increasing pain in his left lower extremity states that he noticed more drainage over the past several days.  Home health continues to see him 2 times per week between clinic visits.    11/25/2020 : Clinic visit with KEV Brantley.  Kodi states he is feeling well today, denies fevers, chills, nausea, vomiting, cough or shortness of breath.  He has been taking doxycycline as prescribed last week.  Discussed culture results in clinic today.  Advised him that I would be prescribing amoxicillin in addition to the doxycycline.          REVIEW OF SYSTEMS:   Review of Systems   Constitutional: Negative for chills and fever.        States he is eating well, appetite normal   Respiratory: Negative for cough and shortness of breath.    Cardiovascular: Positive for leg swelling. Negative for chest pain and claudication.        Chronic swelling in legs, for several years   Gastrointestinal: Negative for constipation, diarrhea, nausea and vomiting.   Genitourinary: Negative for dysuria.   Musculoskeletal: Positive for joint pain. Negative for myalgias.        Chronic   Psychiatric/Behavioral: Positive for depression. The patient is not nervous/anxious.        PHYSICAL EXAMINATION:   /98 (BP Location: Left arm, Patient Position: Sitting)   Pulse (!) 113   Temp 36.8 °C (98.3 °F) (Temporal)   Resp 20   SpO2 93%     Physical Exam   Constitutional: He is oriented to person, place, and time and well-developed, well-nourished, and in no distress.   HENT:   Head: Normocephalic and atraumatic.   Eyes: Pupils are equal, round, and reactive to light. Conjunctivae and EOM are  normal.   Neck: Neck supple.   Cardiovascular: Intact distal pulses.   Pulmonary/Chest: Effort normal.   Musculoskeletal: Normal range of motion.         General: Deformity and edema present.      Comments: Left charcot foot and ankle  1+pitting edema to bilateral LE   Neurological: He is alert and oriented to person, place, and time.   Skin: Skin is warm. There is erythema.   Large full-thickness wounds to left lower extremities  Refer to wound flowsheet and photos     Psychiatric: Mood, affect and judgment normal.       WOUND ASSESSMENT     Wound 06/05/20 Full Thickness Wound Leg Left LLE anterolateral (Active)   Wound Image    11/25/20 1415   Site Assessment Red;Yellow 11/25/20 1415   Periwound Assessment Edema 11/25/20 1415   Margins Attached edges 11/25/20 1415   Closure Secondary intention 11/11/20 1300   Drainage Amount Large 11/25/20 1415   Drainage Description Serosanguineous 11/25/20 1415   Treatments Cleansed;Topical Lidocaine;Provider debridement 11/25/20 1415   Wound Cleansing Foam Cleanser/Washcloth 11/25/20 1415   Periwound Protectant Barrier Paste 11/25/20 1415   Dressing Cleansing/Solutions Not Applicable 10/28/20 0900   Dressing Options Antimicrobial 7 Day (Acticoat 7);Absorbent Abdominal Pad;Unna Boot 11/25/20 1415   Dressing Options (Historical)  Wound Vac;Compression Wrap Two Layer 07/15/20 1430   Dressing Changed Changed 11/11/20 1300   Dressing Status Clean;Dry;Intact 11/11/20 1300   Dressing Change/Treatment Frequency Every 48 hrs, and As Needed 11/11/20 1300   Non-staged Wound Description Full thickness 11/25/20 1415   Wound Length (cm) 6.1 cm 11/25/20 1415   Wound Width (cm) 2 cm 11/25/20 1415   Wound Depth (cm) 0.1 cm 11/25/20 1415   Wound Surface Area (cm^2) 12.2 cm^2 11/25/20 1415   Wound Volume (cm^3) 1.22 cm^3 11/25/20 1415   Post-Procedure Length (cm) 6.2 cm 11/25/20 1415   Post-Procedure Width (cm) 2.2 cm 11/25/20 1415   Post-Procedure Depth (cm) 0.1 cm 11/25/20 1415   Post-Procedure  Surface Area (cm^2) 13.64 cm^2 11/25/20 1415   Post-Procedure Volume (cm^3) 1.36 cm^3 11/25/20 1415   Wound Healing % 98 11/25/20 1415   Wound Bed Granulation (%) 20 % 09/30/20 1400   Wound Bed Epithelium (%) 0 % 07/29/20 1450   Wound Bed Slough (%) 40 % 10/21/20 1413   Wound Bed Eschar (%) 0 % 07/29/20 1450   Wound Bed Granulation (%) - Post-Procedure 100 % 09/30/20 1400   Tunneling (cm) 0 cm 11/25/20 1415   Undermining (cm) 0 cm 11/25/20 1415   Wound Odor None 11/25/20 1415   Exposed Structures None 11/25/20 1415       Wound 06/05/20 Full Thickness Wound Ankle LLE distal posteromedial (Active)   Wound Image    11/25/20 1415   Site Assessment Red;Yellow 11/25/20 1415   Periwound Assessment Edema;Denuded 11/25/20 1415   Margins Attached edges 11/25/20 1415   Closure Secondary intention 11/11/20 1300   Drainage Amount Large 11/25/20 1415   Drainage Description Serosanguineous 11/25/20 1415   Treatments Cleansed;Topical Lidocaine;Provider debridement 11/25/20 1415   Wound Cleansing Foam Cleanser/Washcloth 11/25/20 1415   Periwound Protectant Barrier Paste 11/25/20 1415   Dressing Cleansing/Solutions Not Applicable 11/11/20 1300   Dressing Options Antimicrobial 7 Day (Acticoat 7);Absorbent Abdominal Pad;Unna Boot 11/25/20 1415   Dressing Options (Historical)  Hydrofera Blue Ready;Absorbent Abdominal Pad;Compression Wrap Two Layer 07/15/20 1430   Dressing Changed Changed 11/11/20 1300   Dressing Status Clean;Dry;Intact 11/11/20 1300   Dressing Change/Treatment Frequency Every 48 hrs, and As Needed 11/11/20 1300   Non-staged Wound Description Full thickness 11/25/20 1415   Wound Length (cm) 2.1 cm 11/25/20 1415   Wound Width (cm) 4 cm 11/25/20 1415   Wound Depth (cm) 0.2 cm 11/25/20 1415   Wound Surface Area (cm^2) 8.4 cm^2 11/25/20 1415   Wound Volume (cm^3) 1.68 cm^3 11/25/20 1415   Post-Procedure Length (cm) 2.2 cm 11/25/20 1415   Post-Procedure Width (cm) 4 cm 11/25/20 1415   Post-Procedure Depth (cm) 0.2 cm 11/25/20  1415   Post-Procedure Surface Area (cm^2) 8.8 cm^2 11/25/20 1415   Post-Procedure Volume (cm^3) 1.76 cm^3 11/25/20 1415   Wound Healing % 71 11/25/20 1415   Wound Bed Granulation (%) 10 % 09/30/20 1400   Wound Bed Epithelium (%) 0 % 07/29/20 1450   Wound Bed Slough (%) 50 % 10/21/20 1413   Wound Bed Eschar (%) 0 % 07/29/20 1450   Wound Bed Granulation (%) - Post-Procedure 100 % 09/30/20 1400   Tunneling (cm) 0 cm 11/25/20 1415   Undermining (cm) 0 cm 11/25/20 1415   Wound Odor None 11/25/20 1415   Exposed Structures None 11/25/20 1415       Wound 06/05/20 Full Thickness Wound Leg Left LLE posteromedial proximal (Active)   Wound Image    11/25/20 1415   Site Assessment Red;Yellow 11/25/20 1415   Periwound Assessment Edema;Denuded 11/25/20 1415   Margins Attached edges 11/25/20 1415   Closure Secondary intention 11/11/20 1300   Drainage Amount Large 11/25/20 1415   Drainage Description Serosanguineous 11/25/20 1415   Treatments Cleansed;Topical Lidocaine;Provider debridement 11/25/20 1415   Wound Cleansing Foam Cleanser/Washcloth 11/25/20 1415   Periwound Protectant Barrier Paste 11/25/20 1415   Dressing Cleansing/Solutions Not Applicable 11/11/20 1300   Dressing Options Antimicrobial 7 Day (Acticoat 7);Absorbent Abdominal Pad;Unna Boot 11/25/20 1415   Dressing Options (Historical)  Hydrofera Blue Ready;Absorbent Abdominal Pad;Compression Wrap Two Layer 07/15/20 1430   Dressing Changed Changed 11/11/20 1300   Dressing Status Clean;Dry;Intact 11/11/20 1300   Dressing Change/Treatment Frequency Every 48 hrs, and As Needed 11/11/20 1300   Non-staged Wound Description Full thickness 11/25/20 1415   Wound Length (cm) 6.4 cm 11/25/20 1415   Wound Width (cm) 7.2 cm 11/25/20 1415   Wound Depth (cm) 0.2 cm 11/25/20 1415   Wound Surface Area (cm^2) 46.08 cm^2 11/25/20 1415   Wound Volume (cm^3) 9.22 cm^3 11/25/20 1415   Post-Procedure Length (cm) 6.5 cm 11/25/20 1415   Post-Procedure Width (cm) 7.5 cm 11/25/20 1415    Post-Procedure Depth (cm) 0.3 cm 11/25/20 1415   Post-Procedure Surface Area (cm^2) 48.75 cm^2 11/25/20 1415   Post-Procedure Volume (cm^3) 14.62 cm^3 11/25/20 1415   Wound Healing % -22 11/25/20 1415   Wound Bed Granulation (%) 10 % 09/30/20 1400   Wound Bed Epithelium (%) 0 % 07/29/20 1450   Wound Bed Slough (%) 75 % 10/21/20 1413   Wound Bed Eschar (%) 0 % 07/29/20 1450   Wound Bed Granulation (%) - Post-Procedure 100 % 09/30/20 1400   Tunneling (cm) 0 cm 11/25/20 1415   Undermining (cm) 0 cm 11/25/20 1415   Wound Odor None 11/25/20 1415   Exposed Structures None 11/25/20 1415       Wound 10/28/20 Left dorsal 4th toe (Active)   Wound Image   11/18/20 1315   Site Assessment Dry;Black 11/25/20 1415   Periwound Assessment Dry;Intact 11/25/20 1415   Margins Attached edges 11/25/20 1415   Closure Open to air 11/11/20 1300   Drainage Amount None 11/25/20 1415   Drainage Description Serosanguineous 10/28/20 0900   Treatments Cleansed 11/25/20 1415   Wound Cleansing Foam Cleanser/Washcloth 11/25/20 1415   Periwound Protectant Not Applicable 11/11/20 1300   Dressing Cleansing/Solutions Not Applicable 11/11/20 1300   Dressing Options Open to Air 11/25/20 1415   Dressing Changed Changed 11/11/20 1300   Dressing Status Clean;Dry;Intact 11/11/20 1300   Dressing Change/Treatment Frequency As Needed 11/11/20 1300   Non-staged Wound Description Not applicable 11/11/20 1300   Wound Length (cm) 0.5 cm 11/11/20 1300   Wound Width (cm) 0.4 cm 11/11/20 1300   Wound Depth (cm) 0.1 cm 10/28/20 0900   Wound Surface Area (cm^2) 0.2 cm^2 11/11/20 1300   Wound Volume (cm^3) 0.06 cm^3 10/28/20 0900   Post-Procedure Length (cm) 0.6 cm 10/28/20 0900   Post-Procedure Width (cm) 0.3 cm 10/28/20 0900   Post-Procedure Depth (cm) 0.1 cm 10/28/20 0900   Post-Procedure Surface Area (cm^2) 0.18 cm^2 10/28/20 0900   Post-Procedure Volume (cm^3) 0.02 cm^3 10/28/20 0900   Tunneling (cm) 0 cm 11/25/20 1415   Undermining (cm) 0 cm 11/25/20 1415   Wound  Odor None 20 1415   Exposed Structures None 20 1415                   PROCEDURE: Excisional debridement of left lower extremity wounds   -2% viscous lidocaine applied topically to wound beds for approximately 10 minutes prior to debridement  -Curette used to excise thick layer of slough from wound beds.  Excisional debridement was performed to remove devitalized tissue from all other wounds until healthy, bleeding tissue was visualized.  Total area debrided 71.19 cm² tissue debrided into the subcutaneous layer of all wounds.    -Bleeding controlled with manual pressure.    -Wounds cleansed with normal saline  -Patient tolerated the procedure well, without c/o pain or discomfort.         Pertinent Labs and Diagnostics:    Labs:     A1c:   Lab Results   Component Value Date/Time    HBA1C 5.7 (H) 2020 11:22 AM      Imagin/3/2020-MRI left tibia-fibula with and without contrast  IMPRESSION:     Moderate lateral leg cellulitis and underlying myositis     No abscess or osteomyelitis    VASCULAR STUDIES: BHUPINDER 2019   Right.    Doppler waveform of the common femoral artery is of high amplitude and    triphasic.    Doppler waveforms at the ankle are brisk and triphasic.    Ankle-brachial index is normal.       Left.    Could not perform ankle-brachial index due to large blisters/ulcers.   Normal toe-brachial index: 0.94   Doppler waveform of the common femoral artery is of high amplitude and    triphasic.    Biphasic waveforms seen at the ankle.     WOUND CULTURE:    2020: Culture collected in clinic  Culture Result Abnormal   Staphylococcus aureus   Light growth     Culture Result Abnormal   Pseudomonas aeruginosa   Light growth     Culture Result Abnormal   Enterococcus faecalis   Light growth              ASSESSMENT AND PLAN:     1. Skin ulcer of left lower leg with fat layer exposed (HCC)  Comment: Patient has history of recurring ulcerations to left lower extremity.  Has undergone  ablation procedures by Dr. White.  Discharged from [facility] in March 2020 due to healing, returned approximately 3 weeks later much worse.  Hospitalized from 4/16 until 5/21/2020 underwent multiple surgical debridements.    11/25/2020: Drainage has decreased somewhat from last assessment.  Area of lateral wound continues to decrease.  -Excisional debridement of wounds in clinic today, medically necessary to promote wound healing.  -Patient to return to clinic weekly for assessment and debridement  -Home health to change dressing 1-2 times per week in between clinic visits      Wound care: Acticoat 7 to manage bioburden, Hydrofiber, absorbent elbow pad, compression 2 layer wrap     2. Chronic venous stasis  Comments: Hemosiderin staining, brawny edema, scarring- findings consistent with CVI.  Pt established at [facility], underwent ablation procedures in 2019.     -Consider referral back to vascular if wounds failed to progress or deteriorate.    3. Acquired deformity of left ankle and foot  Comments: Charcot deformity of foot and ankle.  Very little ROM of ankle.  Patient was referred to orthopedics previously, however did not make appointment.    -Patient will need to follow-up with orthopedics once wounds are healed.    4.  Wound infection     11/25/2020: Culture taken last clinic visit due to increased drainage from his wound, and periwound denudation.  I also started him on doxycycline at that time.  Cultures have since come back positive for MSSA, E faecalis, and Pseudomonas, all light growth.  -Rx for amoxicillin sent to patient's pharmacy  -We will treat Pseudomonas topically, undoubtedly colonized as this wound has been open for a long time.  - Pt advised to go to ER for any increased redness, swelling, drainage or odor, or if he develops fever, chills, nausea or vomiting.      Patient was seen for 15 minutes face to face of which > 50% of appointment time was spent on counseling and coordination of care  regarding the above.            Please note that this note may have been created using voice recognition software. I have worked with technical experts from [facility] to optimize the interface.  I have made every reasonable attempt to correct obvious errors, but there may be errors of grammar and possibly content that I did not discover before finalizing the note.    N

## 2020-11-25 NOTE — PATIENT INSTRUCTIONS
-Keep dressings clean, dry and covered while bathing. Only change dressings if they become over saturated, soiled or fall off.     -Avoid prolonged standing or sitting without elevating your legs.    -Remove your compression garments if you have severe pain, severe swelling, numbness, color change, or temperature change in your toes. If you need to remove your compression garments, do so by unrolling them. Do not cut the compression garments off, this is to prevent cutting yourself on accident.    -Should you experience any significant changes in your wound(s), such as infection (redness, swelling, localized heat, increased pain, fever > 101 F, chills) or have any questions regarding your home care instructions, please contact the wound center at [phone]. If after hours, contact your primary care physician or go to the hospital emergency room.

## 2020-11-25 NOTE — TELEPHONE ENCOUNTER
Patient is on the [facility] Schedule Monday for Flu vaccine/injection.    SPECIFIC Action To Be Taken: Orders pending, please sign.

## 2020-12-01 ENCOUNTER — NON-PROVIDER VISIT (OUTPATIENT)
Dept: MEDICAL GROUP | Facility: MEDICAL CENTER | Age: 65
End: 2020-12-01
Attending: FAMILY MEDICINE
Payer: MEDICARE

## 2020-12-01 DIAGNOSIS — Z23 NEED FOR VACCINATION: ICD-10-CM

## 2020-12-01 PROCEDURE — 90662 IIV NO PRSV INCREASED AG IM: CPT | Performed by: FAMILY MEDICINE

## 2020-12-01 PROCEDURE — 99999 PR NO CHARGE: CPT | Performed by: FAMILY MEDICINE

## 2020-12-02 ENCOUNTER — OFFICE VISIT (OUTPATIENT)
Dept: WOUND CARE | Facility: MEDICAL CENTER | Age: 65
End: 2020-12-02
Attending: NURSE PRACTITIONER
Payer: MEDICARE

## 2020-12-02 VITALS
HEART RATE: 113 BPM | TEMPERATURE: 99.9 F | SYSTOLIC BLOOD PRESSURE: 133 MMHG | DIASTOLIC BLOOD PRESSURE: 82 MMHG | RESPIRATION RATE: 20 BRPM | OXYGEN SATURATION: 96 %

## 2020-12-02 DIAGNOSIS — L08.9 WOUND INFECTION: ICD-10-CM

## 2020-12-02 DIAGNOSIS — T14.8XXA WOUND INFECTION: ICD-10-CM

## 2020-12-02 DIAGNOSIS — L97.921 ULCER OF LEFT LOWER EXTREMITY, LIMITED TO BREAKDOWN OF SKIN (HCC): Primary | ICD-10-CM

## 2020-12-02 DIAGNOSIS — I87.8 CHRONIC VENOUS STASIS: ICD-10-CM

## 2020-12-02 DIAGNOSIS — M21.962 ACQUIRED DEFORMITY OF LEFT ANKLE AND FOOT: ICD-10-CM

## 2020-12-02 PROCEDURE — 11045 DBRDMT SUBQ TISS EACH ADDL: CPT

## 2020-12-02 PROCEDURE — 11042 DBRDMT SUBQ TIS 1ST 20SQCM/<: CPT

## 2020-12-02 PROCEDURE — 11045 DBRDMT SUBQ TISS EACH ADDL: CPT | Performed by: NURSE PRACTITIONER

## 2020-12-02 PROCEDURE — 99213 OFFICE O/P EST LOW 20 MIN: CPT | Mod: 25

## 2020-12-02 PROCEDURE — 11042 DBRDMT SUBQ TIS 1ST 20SQCM/<: CPT | Performed by: NURSE PRACTITIONER

## 2020-12-02 PROCEDURE — 99213 OFFICE O/P EST LOW 20 MIN: CPT | Mod: 25 | Performed by: NURSE PRACTITIONER

## 2020-12-02 ASSESSMENT — ENCOUNTER SYMPTOMS
FEVER: 0
MYALGIAS: 0
CONSTIPATION: 0
CLAUDICATION: 0
DEPRESSION: 1
COUGH: 0
VOMITING: 0
DIARRHEA: 0
NAUSEA: 0
CHILLS: 0
SHORTNESS OF BREATH: 0
NERVOUS/ANXIOUS: 0

## 2020-12-02 NOTE — PROGRESS NOTES
Provider Encounter- Full Thickness wound    HISTORY OF PRESENT ILLNESS  Wound History:    START OF CARE IN CLINIC: 5/20/2020               REFERRING PROVIDER: KEV Keith                 WOUND ETIOLOGY: venous,  likely mixed etiology              LOCATION: Left lower leg, multiple wounds                                   Right lower leg, multiple wounds-first observed in clinic on initial evaluation, 6/5/2020.  Resolved 9/2/2020                HISTORY: Patient is very well-known to this clinic from previous treatment of wounds to his left lower extremity.  He was discharged from the clinic in March 2020 due to full resolution of his wound.  He returned to the clinic approximately 3 weeks later, on 4/16, with cellulitis, edema, and large open wounds to his left lower leg, and was sent directly to [facility] emergency room.  He was admitted for IV antibiotics and surgical intervention.  On 4/22/2020, he underwent an I&D of his left lower extremity, fasciotomy, and VAC placement.  In the following weeks he underwent surgery 3 more times for irrigation and debridement.  He was discharged home on 5/21, on the VAC, with home health and a referral to [facility].       Pertinent Medical History:  Anxiety, Charcot's joint of left foot, non-diabetic (3/21/2016), Chronic congestive heart failure (HCC) (11/16/2017), Hepatitis C, chronic (HCC), Hypertension, Migraine, Polysubstance abuse (HCC) (3/8/2018), Tobacco use (4/18/2016), Ulcer of left lower extremity with necrosis of muscle (HCC) (3/21/2016), and Venous stasis ulcer ([facility]) (2017).                ETIOLOGY HISTORY:  Vascular Surgeon: Dr. White. Compression Circ-aid. Varicose Veins none visible     TOBACCO USE: Patient denies; patient also denies alcohol and recreational drug use    Patient's problem list, allergies, and current medications reviewed and updated in Epic    Interval History:  6/5/2020: Initial clinic visit with KEV Brantley.  Patient  "returns to clinic after a long hospitalization.  The VAC was held by [facility] due to severe maceration and deterioration of wound beds.  He also presents today with new wounds to his right lower extremity.  He states he does not know how or when these occurred, states, \"they just popped up\".  He is uncertain whether or not he has follow-up appointments with his surgeon.  He does not know what medications he is on.      6/12/2020 : Clinic visit with KEV Brantley.  Goran states he is feeling well today,denies fevers, chills, nausea, vomiting, cough or shortness of breath.  Condition of his periwound skin is significantly improved.  I would like to restart VAC, however he did not bring the VAC with him, and that has already been on for 2-1/2 weeks.  Will likely need to be reordered.  [facility] continues to see him several times per week for dressing changes.  He is tolerating compression without any difficulty.    6/19/2020 : Clinic visit with KEV Brantley.   Goran states he is feeling well today,denies fevers, chills, nausea, vomiting, cough or shortness of breath.  He brought his VAC with him today.  However,  too much slough on wound beds to consider re-applying.  [facility] continues to see him 2 times per week.  I have asked him to bring the VAC with him again next week, and will hopefully place it at that time.  Per patient, dressings are saturated between dressing changes.    6/26/2020 : Clinic visit with KEV Brantley.   Goran states he is feeling well today,denies fevers, chills, nausea, vomiting, cough or shortness of breath.  He brought his VAC with him again today, however we are not able to put it on as he is still having twice drainage.  Sensitivities from culture still pending.  However I did start him on Augmentin today which we are able to obtain from [facility] prior to his leaving the clinic today and had him take one before he left.  [facility] is been seen 2 times per week " in between clinic visits.  We will try and get them to see him over the weekend to do an additional dressing change.  Will adjust antibiotics once sensitivities are known.  Hopefully start VAC next clinic visit.    7/2/2020 : Clinic visit with KEV Brantley.    Goran states he is feeling well today,denies fevers, chills, nausea, vomiting, cough or shortness of breath.  His VAC is here in the clinic, however he does not have another home health appointment until Monday, 4 days from now.  He has been taking Augmentin as prescribed last week.  I informed him that I added Bactrim DS after I got sensitivity results, and that it should be waiting for him at his pharmacy.    7/9/2020: Clinic visit with KEV Luna. Patient states that they are feeling well today.  Patient denies fever, chills, nausea, vomiting, lightheadedness, dizziness, shortness of breath and chest pain.  All wound beds debrided in clinic today patient's wounds continue to progress beefy red granulation tissue to left lower extremity lateral wound tendon is being covered with granulation tissue continue with wound VAC and current plan of care.    7/15/2020 : Clinic visit with KEV Brantley.  Goran states he is feeling well today,denies fevers, chills, nausea, vomiting, cough or shortness of breath.  Home health continues to see him in between clinic visits.  He states he has not been smoking, drinking or using any street drugs.    7/22/2020 : Clinic visit with KEV Brantley.   Goran states he is feeling well today,denies fevers, chills, nausea, vomiting, cough or shortness of breath.  Home health continues to see him in between clinic visits.    7/29/2020: Clinic visit with KEV Luna. Patient states that they are feeling well today.  Patient denies fever, chills, nausea, vomiting, lightheadedness, dizziness, shortness of breath and chest pain.    8/5/2020: Clinic visit with KEV Luna. Patient  states that they are feeling well today.  Patient denies fever, chills, nausea, vomiting, lightheadedness, dizziness, shortness of breath and chest pain.  Patient's wounds are foul-smelling today.  Patient had positive culture we have already ordered the patient Cipro and Augmentin patient reports that he got my message from last night.  Patient reports that he is can  antibiotics tonight in the ED initiate them.  Patient has been referred to infectious disease due to multiple bacterial growth and for their expert opinion.    8/20/2020 : Clinic visit with KEV Brantley.  Goran states he is feeling well, denies fevers, chills, nausea, vomiting, cough or shortness of breath.  He states he has not yet heard from ID.  However, review of referral note indicates that a message was left on his phone earlier today.  Kodi states he will check his message and will call them back.  He completed his antibiotics 2 days ago.    9/2/2020 : Clinic visit with KEV Brantley.   Goran states he is feeling well, denies fevers, chills, nausea, vomiting, cough or shortness of breath.  He was seen by ID yesterday, no new antibiotics ordered, however MRI ordered to assess for OM.  Goran has an appoint with his PCP tomorrow.    9/9/2020 : Clinic visit with KEV Brantley.  Goran states he is feeling well, denies fevers, chills, nausea, vomiting, cough or shortness of breath.  Reviewed MRI results with him in clinic today, no evidence of OM.    9/16/2020 : Clinic visit with KEV Brantley.  Goran presents today looking more disheveled than usual, he wore sunglasses throughout the visit.  His speech is a bit pressured and disorganized.  He denies using any street drugs or drinking alcohol.  His dressings are saturated, and his wounds have deteriorated significantly.  He denies fevers, chills, nausea, vomiting, cough or shortness of breath.  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  Home health continues to see him in between clinic visits for dressing changes.    9/30/2020 : Clinic visit with KEV Brantley.  Goran states he is feeling well today, denies fevers, chills, nausea, vomiting, cough or shortness of breath.  He missed his appointment last week, arrived too late and could not be seen.  He is again wearing his sunglasses throughout today's visit.  He denies using any street drugs or drinking alcohol.  Home health continues to see him in between clinic visits.    10/9/2020: Clinic visit with Abeba ANGULO. Pt denies fevers, chills, nausea, vomiting. Followed by HH. Presents with foul smelling fluorescent green/yellow drainage from wounds and on dressings.     10/21/2020 : Clinic visit with KEV Brantley.  Goran states he is feeling well today, denies fevers, chills, nausea, vomiting, cough or shortness of breath.  He completed his antibiotics a few days ago.  He has not been showering with his wound open, states his house has some, \"plumbing issues\".  Wound has been treated with daily Dakin's moistened gauze.  Patient has been able to change dressing in between clinic visits.    10/28/2020: Clinic visit with KEV Luna. Patient states that they are feeling well today.  Patient denies fever, chills, nausea, vomiting, lightheadedness, dizziness, shortness of breath and chest pain.  We will hold Dakin's application for 1 week.  Left anterior lower extremity wound is progressing however slowly, appears to have red beefy granulation tissue forming.  Medial and posterior wounds continue to wax and wane.      11/11/2020 : Clinic visit with KEV Brantley.  Goran states he is feeling well today, denies fevers, chills, nausea, vomiting, cough or shortness of breath.  Normally home health sees him 2 times per week in between clinic visits.  However, one of his appointments " was canceled due to illness of home health RN, and he went 5 days in between dressing changes.  He states that during this time the drainage and odor increase significantly.       11/18/2020 : Clinic visit with KEV Brantley.  Goran states that he is feeling well today, denies fevers, chills, nausea, vomiting, cough or shortness of breath.  Denies increasing pain in his left lower extremity states that he noticed more drainage over the past several days.  Home health continues to see him 2 times per week between clinic visits.    11/25/2020 : Clinic visit with KEV Brantley.  Kodi states he is feeling well today, denies fevers, chills, nausea, vomiting, cough or shortness of breath.  He has been taking doxycycline as prescribed last week.  Discussed culture results in clinic today.  Advised him that I would be prescribing amoxicillin in addition to the doxycycline.      12/2/2020 : Clinic visit with JOHNSON Brantleye states he is feeling well today, denies fevers, chills, nausea, vomiting, cough or shortness of breath.  He does seem a bit out of sorts today, speech pressured and tangential.  He is wearing his sunglasses throughout today's visit.      REVIEW OF SYSTEMS:   Review of Systems   Constitutional: Negative for chills and fever.        States he is eating well, appetite normal   Respiratory: Negative for cough and shortness of breath.    Cardiovascular: Positive for leg swelling. Negative for chest pain and claudication.        Chronic swelling in legs, for several years   Gastrointestinal: Negative for constipation, diarrhea, nausea and vomiting.   Genitourinary: Negative for dysuria.   Musculoskeletal: Positive for joint pain. Negative for myalgias.        Chronic   Psychiatric/Behavioral: Positive for depression. The patient is not nervous/anxious.        PHYSICAL EXAMINATION:   /82 (BP Location: Left arm, Patient Position: Sitting)   Pulse (!) 113   Temp 37.7 °C (99.9 °F)  (Temporal)   Resp 20   SpO2 96%     Physical Exam   Constitutional: He is oriented to person, place, and time and well-developed, well-nourished, and in no distress.   HENT:   Head: Normocephalic and atraumatic.   Eyes: Pupils are equal, round, and reactive to light. Conjunctivae and EOM are normal.   Neck: Neck supple.   Cardiovascular: Intact distal pulses.   Pulmonary/Chest: Effort normal.   Musculoskeletal: Normal range of motion.         General: Deformity and edema present.      Comments: Left charcot foot and ankle  1+pitting edema to bilateral LE   Neurological: He is alert and oriented to person, place, and time.   Skin: Skin is warm. There is erythema.   Large full-thickness wounds to left lower extremities  Refer to wound flowsheet and photos     Psychiatric: Mood, affect and judgment normal.       WOUND ASSESSMENT        Wound 06/05/20 Full Thickness Wound Leg Left LLE anterolateral (Active)   Wound Image    12/02/20 1510   Site Assessment Red;Yellow 12/02/20 1510   Periwound Assessment Denuded;Edema 12/02/20 1510   Margins Attached edges 12/02/20 1510   Closure Secondary intention 11/11/20 1300   Drainage Amount Copious 12/02/20 1510   Drainage Description Serosanguineous;Yellow 12/02/20 1510   Treatments Cleansed;Topical Lidocaine;Provider debridement 12/02/20 1510   Wound Cleansing Puracyn [place] 12/02/20 1510   Periwound Protectant Barrier Paste 12/02/20 1510   Dressing Cleansing/Solutions Not Applicable 10/28/20 0900   Dressing Options Antimicrobial 7 Day (Acticoat 7);Absorbent Abdominal Pad;Unna Boot 12/02/20 1510   Dressing Options (Historical)  Wound Vac;Compression Wrap Two Layer 07/15/20 1430   Dressing Changed Changed 11/11/20 1300   Dressing Status Clean;Dry;Intact 11/11/20 1300   Dressing Change/Treatment Frequency Every 48 hrs, and As Needed 11/11/20 1300   Non-staged Wound Description Full thickness 12/02/20 1510   Wound Length (cm) 6.5 cm 12/02/20 1510   Wound Width (cm) 3 cm 12/02/20  1510   Wound Depth (cm) 0.1 cm 12/02/20 1510   Wound Surface Area (cm^2) 19.5 cm^2 12/02/20 1510   Wound Volume (cm^3) 1.95 cm^3 12/02/20 1510   Post-Procedure Length (cm) 6.7 cm 12/02/20 1510   Post-Procedure Width (cm) 2.2 cm 12/02/20 1510   Post-Procedure Depth (cm) 0.1 cm 12/02/20 1510   Post-Procedure Surface Area (cm^2) 14.74 cm^2 12/02/20 1510   Post-Procedure Volume (cm^3) 1.47 cm^3 12/02/20 1510   Wound Healing % 96 12/02/20 1510   Wound Bed Granulation (%) 20 % 09/30/20 1400   Wound Bed Epithelium (%) 0 % 07/29/20 1450   Wound Bed Slough (%) 40 % 10/21/20 1413   Wound Bed Eschar (%) 0 % 07/29/20 1450   Wound Bed Granulation (%) - Post-Procedure 100 % 09/30/20 1400   Tunneling (cm) 0 cm 12/02/20 1510   Undermining (cm) 0 cm 12/02/20 1510   Wound Odor None 12/02/20 1510   Exposed Structures None 12/02/20 1510       Wound 06/05/20 Full Thickness Wound Ankle LLE distal posteromedial (Active)   Wound Image    12/02/20 1510   Site Assessment Red;Yellow 12/02/20 1510   Periwound Assessment Edema;Denuded 12/02/20 1510   Margins Attached edges 12/02/20 1510   Closure Secondary intention 11/11/20 1300   Drainage Amount Copious 12/02/20 1510   Drainage Description Serosanguineous;Yellow 12/02/20 1510   Treatments Cleansed;Topical Lidocaine;Provider debridement 12/02/20 1510   Wound Cleansing Puracyn [place] 12/02/20 1510   Periwound Protectant Barrier Paste 12/02/20 1510   Dressing Cleansing/Solutions Not Applicable 11/11/20 1300   Dressing Options Antimicrobial 7 Day (Acticoat 7);Absorbent Abdominal Pad;Unna Boot 12/02/20 1510   Dressing Options (Historical)  Hydrofera Blue Ready;Absorbent Abdominal Pad;Compression Wrap Two Layer 07/15/20 1430   Dressing Changed Changed 11/11/20 1300   Dressing Status Clean;Dry;Intact 11/11/20 1300   Dressing Change/Treatment Frequency Every 48 hrs, and As Needed 11/11/20 1300   Non-staged Wound Description Full thickness 12/02/20 1510   Wound Length (cm) 0.7 cm 12/02/20 1510   Wound  Width (cm) 4 cm 12/02/20 1510   Wound Depth (cm) 0.2 cm 12/02/20 1510   Wound Surface Area (cm^2) 2.8 cm^2 12/02/20 1510   Wound Volume (cm^3) 0.56 cm^3 12/02/20 1510   Post-Procedure Length (cm) 0.9 cm 12/02/20 1510   Post-Procedure Width (cm) 4.2 cm 12/02/20 1510   Post-Procedure Depth (cm) 0.2 cm 12/02/20 1510   Post-Procedure Surface Area (cm^2) 3.78 cm^2 12/02/20 1510   Post-Procedure Volume (cm^3) 0.76 cm^3 12/02/20 1510   Wound Healing % 90 12/02/20 1510   Wound Bed Granulation (%) 10 % 09/30/20 1400   Wound Bed Epithelium (%) 0 % 07/29/20 1450   Wound Bed Slough (%) 50 % 10/21/20 1413   Wound Bed Eschar (%) 0 % 07/29/20 1450   Wound Bed Granulation (%) - Post-Procedure 100 % 09/30/20 1400   Tunneling (cm) 0 cm 12/02/20 1510   Undermining (cm) 0 cm 12/02/20 1510   Wound Odor None 12/02/20 1510   Exposed Structures None 12/02/20 1510       Wound 06/05/20 Full Thickness Wound Leg Left LLE posteromedial proximal (Active)   Wound Image    12/02/20 1510   Site Assessment Red;Yellow 12/02/20 1510   Periwound Assessment Edema;Denuded 12/02/20 1510   Margins Attached edges 12/02/20 1510   Closure Secondary intention 11/11/20 1300   Drainage Amount Copious 12/02/20 1510   Drainage Description Serosanguineous;Yellow 12/02/20 1510   Treatments Cleansed;Topical Lidocaine;Provider debridement 12/02/20 1510   Wound Cleansing Puracyn [place] 12/02/20 1510   Periwound Protectant Barrier Paste 12/02/20 1510   Dressing Cleansing/Solutions Not Applicable 11/11/20 1300   Dressing Options Antimicrobial 7 Day (Acticoat 7);Absorbent Abdominal Pad;Unna Boot 12/02/20 1510   Dressing Options (Historical)  Hydrofera Blue Ready;Absorbent Abdominal Pad;Compression Wrap Two Layer 07/15/20 1430   Dressing Changed Changed 11/11/20 1300   Dressing Status Clean;Dry;Intact 11/11/20 1300   Dressing Change/Treatment Frequency Every 48 hrs, and As Needed 11/11/20 1300   Non-staged Wound Description Full thickness 12/02/20 1510   Wound Length (cm)  6.5 cm 12/02/20 1510   Wound Width (cm) 7 cm 12/02/20 1510   Wound Depth (cm) 0.2 cm 12/02/20 1510   Wound Surface Area (cm^2) 45.5 cm^2 12/02/20 1510   Wound Volume (cm^3) 9.1 cm^3 12/02/20 1510   Post-Procedure Length (cm) 6.6 cm 12/02/20 1510   Post-Procedure Width (cm) 3 cm 12/02/20 1510   Post-Procedure Depth (cm) 0.2 cm 12/02/20 1510   Post-Procedure Surface Area (cm^2) 19.8 cm^2 12/02/20 1510   Post-Procedure Volume (cm^3) 3.96 cm^3 12/02/20 1510   Wound Healing % -20 12/02/20 1510   Wound Bed Granulation (%) 10 % 09/30/20 1400   Wound Bed Epithelium (%) 0 % 07/29/20 1450   Wound Bed Slough (%) 75 % 10/21/20 1413   Wound Bed Eschar (%) 0 % 07/29/20 1450   Wound Bed Granulation (%) - Post-Procedure 100 % 09/30/20 1400   Tunneling (cm) 0 cm 12/02/20 1510   Undermining (cm) 0 cm 12/02/20 1510   Wound Odor None 12/02/20 1510   Exposed Structures None 12/02/20 1510       Wound 12/02/20 Venous Ulcer Leg Anterior Left (Active)   Wound Image   12/02/20 1510   Site Assessment Red 12/02/20 1510   Periwound Assessment Edema;Denuded 12/02/20 1510   Margins Attached edges 12/02/20 1510   Drainage Amount Copious 12/02/20 1510   Drainage Description Serosanguineous;Yellow 12/02/20 1510   Treatments Cleansed;Site care 12/02/20 1510   Wound Cleansing Puracyn [place] 12/02/20 1510   Periwound Protectant Barrier Paste 12/02/20 1510   Dressing Options Unna Boot 12/02/20 1510   Non-staged Wound Description Partial thickness 12/02/20 1510   Wound Length (cm) 4.5 cm 12/02/20 1510   Wound Width (cm) 4 cm 12/02/20 1510   Wound Depth (cm) 0 cm 12/02/20 1510   Wound Surface Area (cm^2) 18 cm^2 12/02/20 1510   Wound Volume (cm^3) 0 cm^3 12/02/20 1510   Tunneling (cm) 0 cm 12/02/20 1510   Undermining (cm) 0 cm 12/02/20 1510   Wound Odor None 12/02/20 1510   Exposed Structures None 12/02/20 1510                      PROCEDURE: Excisional debridement of left lower extremity wounds   -2% viscous lidocaine applied topically to wound beds for  approximately 10 minutes prior to debridement  -Curette used to excise thick layer of slough from wound beds.  Excisional debridement was performed to remove devitalized tissue from all other wounds until healthy, bleeding tissue was visualized.  Total area debrided 56.32 cm² tissue debrided into the subcutaneous layer of all wounds.    -Bleeding controlled with manual pressure.    -Wounds cleansed with normal saline  -Patient tolerated the procedure well, without c/o pain or discomfort.         Pertinent Labs and Diagnostics:    Labs:     A1c:   Lab Results   Component Value Date/Time    HBA1C 5.7 (H) 2020 11:22 AM      Imagin/3/2020-MRI left tibia-fibula with and without contrast  IMPRESSION:     Moderate lateral leg cellulitis and underlying myositis     No abscess or osteomyelitis    VASCULAR STUDIES: BHUPINDER 2019   Right.    Doppler waveform of the common femoral artery is of high amplitude and    triphasic.    Doppler waveforms at the ankle are brisk and triphasic.    Ankle-brachial index is normal.       Left.    Could not perform ankle-brachial index due to large blisters/ulcers.   Normal toe-brachial index: 0.94   Doppler waveform of the common femoral artery is of high amplitude and    triphasic.    Biphasic waveforms seen at the ankle.     WOUND CULTURE:    2020: Culture collected in clinic  Culture Result Abnormal   Staphylococcus aureus   Light growth     Culture Result Abnormal   Pseudomonas aeruginosa   Light growth     Culture Result Abnormal   Enterococcus faecalis   Light growth              ASSESSMENT AND PLAN:     1. Skin ulcer of left lower leg with fat layer exposed (HCC)  Comment: Patient has history of recurring ulcerations to left lower extremity.  Has undergone ablation procedures by Dr. White.  Discharged from [facility] in 2020 due to healing, returned approximately 3 weeks later much worse.  Hospitalized from  until 2020 underwent multiple surgical  debridements.    12/2/2020: Drainage is again heavy today.  He states he has been taking his antibiotics as prescribed.  -Excisional debridement of wounds in clinic today, medically necessary to promote wound healing.  -Patient to return to clinic weekly for assessment and debridement  -Home health to change dressing 1-2 times per week in between clinic visits      Wound care: Acticoat 7 to manage bioburden, Hydrofiber, absorbent elbow pad, compression 2 layer wrap     2. Chronic venous stasis  Comments: Hemosiderin staining, brawny edema, scarring- findings consistent with CVI.  Pt established at [facility], underwent ablation procedures in 2019.     -Consider referral back to vascular if wounds failed to progress or deteriorate.    3. Acquired deformity of left ankle and foot  Comments: Charcot deformity of foot and ankle.  Very little ROM of ankle.  Patient was referred to orthopedics previously, however did not make appointment.    -Patient will need to follow-up with orthopedics once wounds are healed.    4.  Wound infection     12/2/2020: Patient was prescribed doxycycline and then amoxicillin for positive culture results.  Wound appeared to have improved at first, however now appear to be deteriorating.  -Patient to complete antibiotics as prescribed  -Refer him back to ID  - Pt advised to go to ER for any increased redness, swelling, drainage or odor, or if he develops fever, chills, nausea or vomiting.      Patient was seen for 15 minutes face to face of which > 50% of appointment time was spent on counseling and coordination of care regarding the above.            Please note that this note may have been created using voice recognition software. I have worked with technical experts from [employer] to optimize the interface.  I have made every reasonable attempt to correct obvious errors, but there may be errors of grammar and possibly content that I did not discover before finalizing the note.    N

## 2020-12-02 NOTE — PROGRESS NOTES
"Goran Chavez is a 65 y.o. male here for a non-provider visit for:   FLU    Reason for immunization: Annual Flu Vaccine  Immunization records indicate need for vaccine: Yes, confirmed with Epic  Minimum interval has been met for this vaccine: Yes  ABN completed: Yes    Order and dose verified by: CARLOTA  VIS Dated  08/15/2019 was given to patient: Yes  All IAC Questionnaire questions were answered \"No.\"    Patient tolerated injection and no adverse effects were observed or reported: Yes    Pt scheduled for next dose in series: Not Indicated    "

## 2020-12-03 NOTE — PROGRESS NOTES
I called patient's cell phone after he left, due to concern for his mentation today.  His daughter picked up his cell phone, stated she had his phone today.  I discussed my concerns with his daughter.  She thought this might be to marijuana use.  I informed her that people can sometimes become confused with infection.  Advised her to take him to the emergency room if she felt his mental status today was due to something other than drug use.

## 2020-12-03 NOTE — PROGRESS NOTES
"During this afternoon's visit, patient had sunglasses covering eyes at all times.   Speech erratic with thought process jumbled; changing subjects frequently. 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 There's a serial killer in [facility]. They're killing homeless and getting away with it because the  are happy\". Patient then states, \"I can slow it down, but I can't stop it completely\".  Later in the appointment, patient states, \"I've got to go get some gold. My brother in law has a boulder in his swimming hole. We chip the gold out of it\".  Upon completing appointment, patient stood up to leave and states, \"Those pills can make you dizzy. I know this feeling.\" Patient able to walk out of clinic without incident.   "

## 2020-12-09 ENCOUNTER — OFFICE VISIT (OUTPATIENT)
Dept: WOUND CARE | Facility: MEDICAL CENTER | Age: 65
End: 2020-12-09
Attending: NURSE PRACTITIONER
Payer: MEDICARE

## 2020-12-09 VITALS
DIASTOLIC BLOOD PRESSURE: 92 MMHG | SYSTOLIC BLOOD PRESSURE: 146 MMHG | HEART RATE: 92 BPM | TEMPERATURE: 97.8 F | RESPIRATION RATE: 20 BRPM | OXYGEN SATURATION: 97 %

## 2020-12-09 DIAGNOSIS — L97.921 ULCER OF LEFT LOWER EXTREMITY, LIMITED TO BREAKDOWN OF SKIN (HCC): Primary | ICD-10-CM

## 2020-12-09 DIAGNOSIS — T14.8XXA PAIN ASSOCIATED WITH WOUND: ICD-10-CM

## 2020-12-09 DIAGNOSIS — R52 PAIN ASSOCIATED WITH WOUND: ICD-10-CM

## 2020-12-09 DIAGNOSIS — T14.8XXA WOUND INFECTION: ICD-10-CM

## 2020-12-09 DIAGNOSIS — I87.8 CHRONIC VENOUS STASIS: ICD-10-CM

## 2020-12-09 DIAGNOSIS — M21.962 ACQUIRED DEFORMITY OF LEFT ANKLE AND FOOT: ICD-10-CM

## 2020-12-09 DIAGNOSIS — L08.9 WOUND INFECTION: ICD-10-CM

## 2020-12-09 PROCEDURE — 11042 DBRDMT SUBQ TIS 1ST 20SQCM/<: CPT

## 2020-12-09 PROCEDURE — 11045 DBRDMT SUBQ TISS EACH ADDL: CPT | Performed by: NURSE PRACTITIONER

## 2020-12-09 PROCEDURE — 99213 OFFICE O/P EST LOW 20 MIN: CPT | Mod: 25 | Performed by: NURSE PRACTITIONER

## 2020-12-09 PROCEDURE — 11042 DBRDMT SUBQ TIS 1ST 20SQCM/<: CPT | Performed by: NURSE PRACTITIONER

## 2020-12-09 PROCEDURE — 99213 OFFICE O/P EST LOW 20 MIN: CPT | Mod: 25

## 2020-12-09 PROCEDURE — 11045 DBRDMT SUBQ TISS EACH ADDL: CPT

## 2020-12-09 ASSESSMENT — ENCOUNTER SYMPTOMS
NERVOUS/ANXIOUS: 0
MYALGIAS: 0
DIARRHEA: 0
CLAUDICATION: 0
DEPRESSION: 1
FEVER: 0
CHILLS: 0
NAUSEA: 0
COUGH: 0
CONSTIPATION: 0
SHORTNESS OF BREATH: 0
VOMITING: 0

## 2020-12-09 ASSESSMENT — PAIN SCALES - GENERAL: PAINLEVEL: 7=MODERATE-SEVERE PAIN

## 2020-12-09 NOTE — LETTER
2020      Re: Goran Chavez,  1955    To:Home Health Orders for [facility]:    Pt will continue to be seen at the wound clinic 1x/week.  Please change his dressing 2x/week at home, for a total of 3 dressing changes per week.    LLE wounds (anterolateral, posteromedial distal, posteromedial proximal):  1. Remove prior dressing.  2. Cleanse leg with wound cleanser, saline, or soap and water and pat dry.  3. Apply zinc barrier cream to immediate periwound.  5. Cover each wound with acticoat 7 (fenestrated)  7. Then ABD pads (at least 2)  8. Apply 4 layer compression wrap if available, otherwise use a 2 layer compression wrap.    Feel free to contact [facility] with any questions.  [phone].    Thank you,  KEV Mead

## 2020-12-09 NOTE — PATIENT INSTRUCTIONS
.-Keep your wound dressing clean, dry, and intact.    -Remove your compression wrap if you have severe pain, severe swelling, numbness, color change, or temperature change in your toes. If you need to remove your compression wrap, do so by unrolling it. Do not cut the compression wrap off to prevent cutting yourself on accident.    -Should you experience any significant changes in your wound(s), such as infection (redness, swelling, localized heat, increased pain, fever > 101 F, chills) or have any questions regarding your home care instructions, please contact the wound center at [phone]. If after hours, contact your primary care physician or go to the hospital emergency room.

## 2020-12-09 NOTE — PROGRESS NOTES
Provider Encounter- Full Thickness wound    HISTORY OF PRESENT ILLNESS  Wound History:    START OF CARE IN CLINIC: 5/20/2020               REFERRING PROVIDER: KEV Keith                 WOUND ETIOLOGY: venous,  likely mixed etiology              LOCATION: Left lower leg, multiple wounds                                   Right lower leg, multiple wounds-first observed in clinic on initial evaluation, 6/5/2020.  Resolved 9/2/2020                HISTORY: Patient is very well-known to this clinic from previous treatment of wounds to his left lower extremity.  He was discharged from the clinic in March 2020 due to full resolution of his wound.  He returned to the clinic approximately 3 weeks later, on 4/16, with cellulitis, edema, and large open wounds to his left lower leg, and was sent directly to [facility] emergency room.  He was admitted for IV antibiotics and surgical intervention.  On 4/22/2020, he underwent an I&D of his left lower extremity, fasciotomy, and VAC placement.  In the following weeks he underwent surgery 3 more times for irrigation and debridement.  He was discharged home on 5/21, on the VAC, with home health and a referral to [facility].       Pertinent Medical History:  Anxiety, Charcot's joint of left foot, non-diabetic (3/21/2016), Chronic congestive heart failure (HCC) (11/16/2017), Hepatitis C, chronic (HCC), Hypertension, Migraine, Polysubstance abuse (HCC) (3/8/2018), Tobacco use (4/18/2016), Ulcer of left lower extremity with necrosis of muscle (HCC) (3/21/2016), and Venous stasis ulcer ([facility]) (2017).                ETIOLOGY HISTORY:  Vascular Surgeon: Dr. White. Compression Circ-aid. Varicose Veins none visible     TOBACCO USE: Patient denies; patient also denies alcohol and recreational drug use    Patient's problem list, allergies, and current medications reviewed and updated in Epic    Interval History:  6/5/2020: Initial clinic visit with KEV Brantley.  Patient  "returns to clinic after a long hospitalization.  The [facility] due to severe maceration and deterioration of wound beds.  He also presents today with new wounds to his right lower extremity.  He states he does not know how or when these occurred, states, \"they just popped up\".  He is uncertain whether or not he has follow-up appointments with his surgeon.  He does not know what medications he is on.      6/12/2020 : Clinic visit with KEV Brantley.  Goran states he is feeling well today,denies fevers, chills, nausea, vomiting, cough or shortness of breath.  Condition of his periwound skin is significantly improved.  I would like to restart VAC, however he did not bring the VAC with him, and that has already been on for 2-1/2 weeks.  Will likely need to be reordered.  [facility] continues to see him several times per week for dressing changes.  He is tolerating compression without any difficulty.    6/19/2020 : Clinic visit with KEV Brantley.   Goran states he is feeling well today,denies fevers, chills, nausea, vomiting, cough or shortness of breath.  He brought his VAC with him today.  However,  too much slough on wound beds to consider re-applying.  [facility] continues to see him 2 times per week.  I have asked him to bring the VAC with him again next week, and will hopefully place it at that time.  Per patient, dressings are saturated between dressing changes.    6/26/2020 : Clinic visit with KEV Brantley.   Goran states he is feeling well today,denies fevers, chills, nausea, vomiting, cough or shortness of breath.  He brought his VAC with him again today, however we are not able to put it on as he is still having twice drainage.  Sensitivities from culture still pending.  However I did start him on Augmentin today which we are able to obtain from [facility] prior to his leaving the clinic today and had him take one before he left.  [facility] is been seen 2 times per week " in between clinic visits.  We will try and get them to see him over the weekend to do an additional dressing change.  Will adjust antibiotics once sensitivities are known.  Hopefully start VAC next clinic visit.    7/2/2020 : Clinic visit with KEV Brantley.    Goran states he is feeling well today,denies fevers, chills, nausea, vomiting, cough or shortness of breath.  His VAC is here in the clinic, however he does not have another home health appointment until Monday, 4 days from now.  He has been taking Augmentin as prescribed last week.  I informed him that I added Bactrim DS after I got sensitivity results, and that it should be waiting for him at his pharmacy.    7/9/2020: Clinic visit with KEV Luna. Patient states that they are feeling well today.  Patient denies fever, chills, nausea, vomiting, lightheadedness, dizziness, shortness of breath and chest pain.  All wound beds debrided in clinic today patient's wounds continue to progress beefy red granulation tissue to left lower extremity lateral wound tendon is being covered with granulation tissue continue with wound VAC and current plan of care.    7/15/2020 : Clinic visit with KEV Brantley.  Goran states he is feeling well today,denies fevers, chills, nausea, vomiting, cough or shortness of breath.  Home health continues to see him in between clinic visits.  He states he has not been smoking, drinking or using any street drugs.    7/22/2020 : Clinic visit with KEV Brantley.   Goran states he is feeling well today,denies fevers, chills, nausea, vomiting, cough or shortness of breath.  Home health continues to see him in between clinic visits.    7/29/2020: Clinic visit with KEV Luna. Patient states that they are feeling well today.  Patient denies fever, chills, nausea, vomiting, lightheadedness, dizziness, shortness of breath and chest pain.    8/5/2020: Clinic visit with KEV Luna. Patient  states that they are feeling well today.  Patient denies fever, chills, nausea, vomiting, lightheadedness, dizziness, shortness of breath and chest pain.  Patient's wounds are foul-smelling today.  Patient had positive culture we have already ordered the patient Cipro and Augmentin patient reports that he got my message from last night.  Patient reports that he is can  antibiotics tonight in the ED initiate them.  Patient has been referred to infectious disease due to multiple bacterial growth and for their expert opinion.    8/20/2020 : Clinic visit with KEV Brantley.  Goran states he is feeling well, denies fevers, chills, nausea, vomiting, cough or shortness of breath.  He states he has not yet heard from ID.  However, review of referral note indicates that a message was left on his phone earlier today.  Kodi states he will check his message and will call them back.  He completed his antibiotics 2 days ago.    9/2/2020 : Clinic visit with KEV Brantley.   Goran states he is feeling well, denies fevers, chills, nausea, vomiting, cough or shortness of breath.  He was seen by ID yesterday, no new antibiotics ordered, however MRI ordered to assess for OM.  Goran has an appoint with his PCP tomorrow.    9/9/2020 : Clinic visit with KEV Brantley.  Goran states he is feeling well, denies fevers, chills, nausea, vomiting, cough or shortness of breath.  Reviewed MRI results with him in clinic today, no evidence of OM.    9/16/2020 : Clinic visit with KEV Brantley.  Goran presents today looking more disheveled than usual, he wore sunglasses throughout the visit.  His speech is a bit pressured and disorganized.  He denies using any street drugs or drinking alcohol.  His dressings are saturated, and his wounds have deteriorated significantly.  He denies fevers, chills, nausea, vomiting, cough or shortness of breath.  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  Home health continues to see him in between clinic visits for dressing changes.    9/30/2020 : Clinic visit with KEV Brantley.  Goran states he is feeling well today, denies fevers, chills, nausea, vomiting, cough or shortness of breath.  He missed his appointment last week, arrived too late and could not be seen.  He is again wearing his sunglasses throughout today's visit.  He denies using any street drugs or drinking alcohol.  Home health continues to see him in between clinic visits.    10/9/2020: Clinic visit with Abeba ANGULO. Pt denies fevers, chills, nausea, vomiting. Followed by HH. Presents with foul smelling fluorescent green/yellow drainage from wounds and on dressings.     10/21/2020 : Clinic visit with KEV Brantley.  Goran states he is feeling well today, denies fevers, chills, nausea, vomiting, cough or shortness of breath.  He completed his antibiotics a few days ago.  He has not been showering with his wound open, states his house has some, \"plumbing issues\".  Wound has been treated with daily Dakin's moistened gauze.  Patient has been able to change dressing in between clinic visits.    10/28/2020: Clinic visit with KEV Luna. Patient states that they are feeling well today.  Patient denies fever, chills, nausea, vomiting, lightheadedness, dizziness, shortness of breath and chest pain.  We will hold Dakin's application for 1 week.  Left anterior lower extremity wound is progressing however slowly, appears to have red beefy granulation tissue forming.  Medial and posterior wounds continue to wax and wane.      11/11/2020 : Clinic visit with KEV Brantley.  Goran states he is feeling well today, denies fevers, chills, nausea, vomiting, cough or shortness of breath.  Normally home health sees him 2 times per week in between clinic visits.  However, one of his appointments " "was canceled due to illness of home health RN, and he went 5 days in between dressing changes.  He states that during this time the drainage and odor increase significantly.       11/18/2020 : Clinic visit with KEV Brantley.  Goran states that he is feeling well today, denies fevers, chills, nausea, vomiting, cough or shortness of breath.  Denies increasing pain in his left lower extremity states that he noticed more drainage over the past several days.  Home health continues to see him 2 times per week between clinic visits.    11/25/2020 : Clinic visit with JOHNSON Brantley states he is feeling well today, denies fevers, chills, nausea, vomiting, cough or shortness of breath.  He has been taking doxycycline as prescribed last week.  Discussed culture results in clinic today.  Advised him that I would be prescribing amoxicillin in addition to the doxycycline.      12/2/2020 : Clinic visit with JOHNSON Brantley states he is feeling well today, denies fevers, chills, nausea, vomiting, cough or shortness of breath.  He does seem a bit out of sorts today, speech pressured and tangential.  He is wearing his sunglasses throughout today's visit.      12/9/2020 : Clinic visit with JOHNSON Brantley states he is feeling well today, denies fevers, chills, nausea, vomiting, cough or shortness of breath.  His mentation seems much more normal today.  I did question him about his behavior last clinic visit.  He denies using any street drugs, attributed his behavior to his antibiotics..  He did however mention that his wounds always do better with narcotics.  He states he has pain from his wounds and his ankle, \"all the time\".  I informed him that we do not do maintenance narcotics out of this clinic.  Offered referral to pain management, which he stated he would appreciate.  He is also states that he still taking his antibiotics, though he should have completed amoxicillin a week ago, and " doxycycline 11 days ago.    REVIEW OF SYSTEMS:   Review of Systems   Constitutional: Negative for chills and fever.        States he is eating well, appetite normal   Respiratory: Negative for cough and shortness of breath.    Cardiovascular: Positive for leg swelling. Negative for chest pain and claudication.        Chronic swelling in legs, for several years   Gastrointestinal: Negative for constipation, diarrhea, nausea and vomiting.   Genitourinary: Negative for dysuria.   Musculoskeletal: Positive for joint pain. Negative for myalgias.        Chronic   Psychiatric/Behavioral: Positive for depression. The patient is not nervous/anxious.        PHYSICAL EXAMINATION:   /92   Pulse 92   Temp 36.6 °C (97.8 °F) (Temporal)   Resp 20   SpO2 97%     Physical Exam   Constitutional: He is oriented to person, place, and time and well-developed, well-nourished, and in no distress.   HENT:   Head: Normocephalic and atraumatic.   Eyes: Pupils are equal, round, and reactive to light. Conjunctivae and EOM are normal.   Neck: Neck supple.   Cardiovascular: Intact distal pulses.   Pulmonary/Chest: Effort normal.   Musculoskeletal: Normal range of motion.         General: Deformity and edema present.      Comments: Left charcot foot and ankle  1+pitting edema to bilateral LE   Neurological: He is alert and oriented to person, place, and time.   Skin: Skin is warm. There is erythema.   Large full-thickness wounds to left lower extremities  Refer to wound flowsheet and photos     Psychiatric: Mood, affect and judgment normal.       WOUND ASSESSMENT          Wound 06/05/20 Full Thickness Wound Leg Left LLE anterolateral (Active)   Wound Image    12/09/20 1400   Site Assessment Red;Yellow 12/09/20 1400   Periwound Assessment Denuded;Edema 12/09/20 1400   Margins Attached edges 12/09/20 1400   Closure Secondary intention 11/11/20 1300   Drainage Amount Copious 12/09/20 1400   Drainage Description Serosanguineous;Yellow  12/09/20 1400   Treatments Cleansed;Topical Lidocaine;Provider debridement;Site care 12/09/20 1400   Wound Cleansing Puracyn [place] 12/09/20 1400   Periwound Protectant Barrier Paste 12/09/20 1400   Dressing Cleansing/Solutions Not Applicable 12/09/20 1400   Dressing Options Antimicrobial 7 Day (Acticoat 7);Absorbent Abdominal Pad;Compression Wrap Four Layer 12/09/20 1400   Dressing Options (Historical)  Wound Vac;Compression Wrap Two Layer 07/15/20 1430   Dressing Changed Changed 12/09/20 1400   Dressing Status Clean;Dry;Intact 12/09/20 1400   Dressing Change/Treatment Frequency Every 48 hrs, and As Needed 11/11/20 1300   Non-staged Wound Description Full thickness 12/09/20 1400   Wound Length (cm) 7 cm 12/09/20 1400   Wound Width (cm) 2.8 cm 12/09/20 1400   Wound Depth (cm) 0.1 cm 12/02/20 1510   Wound Surface Area (cm^2) 19.6 cm^2 12/09/20 1400   Wound Volume (cm^3) 1.95 cm^3 12/02/20 1510   Post-Procedure Length (cm) 7.2 cm 12/09/20 1400   Post-Procedure Width (cm) 3 cm 12/09/20 1400   Post-Procedure Depth (cm) 0.1 cm 12/09/20 1400   Post-Procedure Surface Area (cm^2) 21.6 cm^2 12/09/20 1400   Post-Procedure Volume (cm^3) 2.16 cm^3 12/09/20 1400   Wound Healing % 96 12/02/20 1510   Wound Bed Granulation (%) 20 % 09/30/20 1400   Wound Bed Epithelium (%) 0 % 07/29/20 1450   Wound Bed Slough (%) 40 % 10/21/20 1413   Wound Bed Eschar (%) 0 % 07/29/20 1450   Wound Bed Granulation (%) - Post-Procedure 100 % 09/30/20 1400   Tunneling (cm) 0 cm 12/09/20 1400   Undermining (cm) 0 cm 12/09/20 1400   Wound Odor None 12/09/20 1400   Exposed Structures None 12/09/20 1400       Wound 06/05/20 Full Thickness Wound Ankle LLE distal posteromedial (Active)   Wound Image    12/09/20 1400   Site Assessment Red;Yellow;[place] 12/09/20 1400   Periwound Assessment Edema;Denuded 12/09/20 1400   Margins Attached edges 12/09/20 1400   Closure Secondary intention 11/11/20 1300   Drainage Amount Copious 12/09/20 1400   Drainage Description  Serosanguineous;Yellow 12/09/20 1400   Treatments Cleansed;Topical Lidocaine;Provider debridement;Site care 12/09/20 1400   Wound Cleansing Puracyn [place] 12/09/20 1400   Periwound Protectant Barrier Paste 12/09/20 1400   Dressing Cleansing/Solutions Not Applicable 12/09/20 1400   Dressing Options Antimicrobial 7 Day (Acticoat 7);Absorbent Abdominal Pad;Compression Wrap Four Layer 12/09/20 1400   Dressing Options (Historical)  Hydrofera Blue Ready;Absorbent Abdominal Pad;Compression Wrap Two Layer 07/15/20 1430   Dressing Changed Changed 12/09/20 1400   Dressing Status Clean;Dry;Intact 12/09/20 1400   Dressing Change/Treatment Frequency Every 48 hrs, and As Needed 11/11/20 1300   Non-staged Wound Description Full thickness 12/09/20 1400   Wound Length (cm) 2 cm 12/09/20 1400   Wound Width (cm) 3 cm 12/09/20 1400   Wound Depth (cm) 0.2 cm 12/02/20 1510   Wound Surface Area (cm^2) 6 cm^2 12/09/20 1400   Wound Volume (cm^3) 0.56 cm^3 12/02/20 1510   Post-Procedure Length (cm) 2.8 cm 12/09/20 1400   Post-Procedure Width (cm) 3.2 cm 12/09/20 1400   Post-Procedure Depth (cm) 0.3 cm 12/09/20 1400   Post-Procedure Surface Area (cm^2) 8.96 cm^2 12/09/20 1400   Post-Procedure Volume (cm^3) 2.69 cm^3 12/09/20 1400   Wound Healing % 90 12/02/20 1510   Wound Bed Granulation (%) 10 % 09/30/20 1400   Wound Bed Epithelium (%) 0 % 07/29/20 1450   Wound Bed Slough (%) 50 % 10/21/20 1413   Wound Bed Eschar (%) 0 % 07/29/20 1450   Wound Bed Granulation (%) - Post-Procedure 100 % 09/30/20 1400   Tunneling (cm) 0 cm 12/09/20 1400   Undermining (cm) 0 cm 12/09/20 1400   Wound Odor None 12/09/20 1400   Exposed Structures None 12/09/20 1400       Wound 06/05/20 Full Thickness Wound Leg Left LLE posteromedial proximal (Active)   Wound Image    12/09/20 1400   Site Assessment Pink;Red;Yellow 12/09/20 1400   Periwound Assessment Edema;Denuded 12/09/20 1400   Margins Attached edges 12/09/20 1400   Closure Secondary intention 11/11/20 1300    Drainage Amount Copious 12/09/20 1400   Drainage Description Serosanguineous;Yellow 12/09/20 1400   Treatments Cleansed;Topical Lidocaine;Provider debridement;Site care 12/09/20 1400   Wound Cleansing Puracyn [place] 12/09/20 1400   Periwound Protectant Barrier Paste 12/09/20 1400   Dressing Cleansing/Solutions Not Applicable 12/09/20 1400   Dressing Options Antimicrobial 7 Day (Acticoat 7);Absorbent Abdominal Pad;Compression Wrap Four Layer 12/09/20 1400   Dressing Options (Historical)  Hydrofera Blue Ready;Absorbent Abdominal Pad;Compression Wrap Two Layer 07/15/20 1430   Dressing Changed Changed 12/09/20 1400   Dressing Status Clean;Dry;Intact 12/09/20 1400   Dressing Change/Treatment Frequency Every 48 hrs, and As Needed 11/11/20 1300   Non-staged Wound Description Full thickness 12/09/20 1400   Wound Length (cm) 7 cm 12/09/20 1400   Wound Width (cm) 4 cm 12/09/20 1400   Wound Depth (cm) 0.2 cm 12/02/20 1510   Wound Surface Area (cm^2) 28 cm^2 12/09/20 1400   Wound Volume (cm^3) 9.1 cm^3 12/02/20 1510   Post-Procedure Length (cm) 9.2 cm 12/09/20 1400   Post-Procedure Width (cm) 4.3 cm 12/09/20 1400   Post-Procedure Depth (cm) 0.2 cm 12/09/20 1400   Post-Procedure Surface Area (cm^2) 39.56 cm^2 12/09/20 1400   Post-Procedure Volume (cm^3) 7.91 cm^3 12/09/20 1400   Wound Healing % -20 12/02/20 1510   Wound Bed Granulation (%) 10 % 09/30/20 1400   Wound Bed Epithelium (%) 0 % 07/29/20 1450   Wound Bed Slough (%) 75 % 10/21/20 1413   Wound Bed Eschar (%) 0 % 07/29/20 1450   Wound Bed Granulation (%) - Post-Procedure 100 % 09/30/20 1400   Tunneling (cm) 0 cm 12/09/20 1400   Undermining (cm) 0 cm 12/09/20 1400   Wound Odor None 12/09/20 1400   Exposed Structures None 12/09/20 1400       Wound 12/02/20 Venous Ulcer Leg Anterior Left (Active)   Wound Image    12/09/20 1400   Site Assessment Pink;Yellow 12/09/20 1400   Periwound Assessment Edema;Denuded 12/09/20 1400   Margins Attached edges 12/09/20 1400   Drainage  Amount Moderate 12/09/20 1400   Drainage Description Serosanguineous;Yellow 12/09/20 1400   Treatments Cleansed;Topical Lidocaine;Provider debridement;Site care 12/09/20 1400   Wound Cleansing Puracyn [place] 12/09/20 1400   Periwound Protectant Barrier Paste 12/09/20 1400   Dressing Cleansing/Solutions Not Applicable 12/09/20 1400   Dressing Options Antimicrobial 7 Day (Acticoat 7);Compression Wrap Four Layer 12/09/20 1400   Dressing Changed New 12/09/20 1400   Dressing Status Clean;Dry;Intact 12/09/20 1400   Non-staged Wound Description Partial thickness 12/09/20 1400   Wound Length (cm) 3.2 cm 12/09/20 1400   Wound Width (cm) 1.4 cm 12/09/20 1400   Wound Depth (cm) 0 cm 12/02/20 1510   Wound Surface Area (cm^2) 4.48 cm^2 12/09/20 1400   Wound Volume (cm^3) 0 cm^3 12/02/20 1510   Post-Procedure Length (cm) 3.2 cm 12/09/20 1400   Post-Procedure Width (cm) 1.5 cm 12/09/20 1400   Post-Procedure Depth (cm) 0.1 cm 12/09/20 1400   Post-Procedure Surface Area (cm^2) 4.8 cm^2 12/09/20 1400   Post-Procedure Volume (cm^3) 0.48 cm^3 12/09/20 1400   Tunneling (cm) 0 cm 12/09/20 1400   Undermining (cm) 0 cm 12/09/20 1400   Wound Odor None 12/09/20 1400   Exposed Structures None 12/09/20 1400                         PROCEDURE: Excisional debridement of left lower extremity wounds   -2% viscous lidocaine applied topically to wound beds for approximately 10 minutes prior to debridement  -Curette used to excise thick layer of slough from wound beds.  Excisional debridement was performed to remove devitalized tissue from all other wounds until healthy, bleeding tissue was visualized.  Total area debrided 74.6 cm² tissue debrided into the subcutaneous layer of all wounds.    -Bleeding controlled with manual pressure.    -Wounds cleansed with normal saline  -Patient tolerated the procedure well, without c/o pain or discomfort.         Pertinent Labs and Diagnostics:    Labs:     A1c:   Lab Results   Component Value Date/Time    HBA1C  5.7 (H) 2020 11:22 AM      Imagin/3/2020-MRI left tibia-fibula with and without contrast  IMPRESSION:     Moderate lateral leg cellulitis and underlying myositis     No abscess or osteomyelitis    VASCULAR STUDIES: BHUPINDER 2019   Right.    Doppler waveform of the common femoral artery is of high amplitude and    triphasic.    Doppler waveforms at the ankle are brisk and triphasic.    Ankle-brachial index is normal.       Left.    Could not perform ankle-brachial index due to large blisters/ulcers.   Normal toe-brachial index: 0.94   Doppler waveform of the common femoral artery is of high amplitude and    triphasic.    Biphasic waveforms seen at the ankle.     WOUND CULTURE:    2020: Culture collected in clinic  Culture Result Abnormal   Staphylococcus aureus   Light growth     Culture Result Abnormal   Pseudomonas aeruginosa   Light growth     Culture Result Abnormal   Enterococcus faecalis   Light growth              ASSESSMENT AND PLAN:     1. Skin ulcer of left lower leg with fat layer exposed (HCC)  Comment: Patient has history of recurring ulcerations to left lower extremity.  Has undergone ablation procedures by Dr. White.  Discharged from [facility] in 2020 due to healing, returned approximately 3 weeks later much worse.  Hospitalized from  until 2020 underwent multiple surgical debridements.    2020: Drainage remains heavy, skin periwound skin denuded.  -Excisional debridement of wounds in clinic today, medically necessary to promote wound healing.  -Patient to return to clinic weekly for assessment and debridement  -Home health to change dressing 1-2 times per week in between clinic visits      Wound care: Acticoat 7 to manage bioburden, Hydrofiber, absorbent elbow pad, compression 4 layer wrap     2. Chronic venous stasis  Comments: Hemosiderin staining, brawny edema, scarring- findings consistent with CVI.  Pt established at [facility], underwent ablation procedures  in 2019.     -Consider referral back to vascular if wounds failed to progress or deteriorate.    3. Acquired deformity of left ankle and foot  Comments: Charcot deformity of foot and ankle.  Very little ROM of ankle.  Patient was referred to orthopedics previously, however did not make appointment.      -Patient will need to follow-up with orthopedics once wounds are healed.    4.  Wound infection     12/9/2020: Patient was prescribed doxycycline on 11/18 and then amoxicillin on 11/25 for positive culture results.  He states he is still taking these, however should have completed these over week ago.  Wound appeared to have improved at first, however now appear to be deteriorating.  -Patient was referred back to ID on 12/2, I do not see any future appointments with them.  We will follow up.  - Pt advised to go to ER for any increased redness, swelling, drainage or odor, or if he develops fever, chills, nausea or vomiting.      5.  Pain associated with wound    12/9/2020: Patient for me today that he has chronic pain from his wound and ankle.  States that he has had successful wound healing when prescribed narcotics.  -Referral to pain management      Patient was seen for 15 minutes face to face of which > 50% of appointment time was spent on counseling and coordination of care regarding the above.            Please note that this note may have been created using voice recognition software. I have worked with technical experts from [employer] to optimize the interface.  I have made every reasonable attempt to correct obvious errors, but there may be errors of grammar and possibly content that I did not discover before finalizing the note.    N

## 2020-12-09 NOTE — LETTER
Regarding: sylvia Mcclain 1955    Home Health Orders for [facility]:    Pt will continue to be seen at the wound clinic 1x/week.  Please change his dressing 2x/week at home, for a total of 3 dressing changes per week.    LLE wounds (anterolateral, posteromedial distal, posteromedial proximal):  1. Remove prior dressing.  2. Cleanse leg with wound cleanser, saline, or soap and water and pat dry.  3. Apply zinc barrier cream to immediate periwound.  4. Apply lotion to remainder of lower extremity skin.  5. Cover each wound with hydrofiber silver  6. Then plain alginate or plain hydrofiber  7. Then ABD pads (at least 2)  8. Apply 2 layer compression wrap.    Feel free to contact [facility] with any questions.  [phone].    Thank you,  KEV Mead

## 2020-12-16 ENCOUNTER — OFFICE VISIT (OUTPATIENT)
Dept: INFECTIOUS DISEASES | Facility: MEDICAL CENTER | Age: 65
End: 2020-12-16
Payer: MEDICARE

## 2020-12-16 ENCOUNTER — OFFICE VISIT (OUTPATIENT)
Dept: WOUND CARE | Facility: MEDICAL CENTER | Age: 65
End: 2020-12-16
Attending: NURSE PRACTITIONER
Payer: MEDICARE

## 2020-12-16 DIAGNOSIS — L97.922 SKIN ULCER OF LEFT LOWER LEG WITH FAT LAYER EXPOSED (HCC): ICD-10-CM

## 2020-12-16 DIAGNOSIS — I83.029 VENOUS STASIS ULCER OF LEFT LOWER EXTREMITY (HCC): ICD-10-CM

## 2020-12-16 DIAGNOSIS — A49.01 MSSA (METHICILLIN SUSCEPTIBLE STAPHYLOCOCCUS AUREUS) INFECTION: ICD-10-CM

## 2020-12-16 DIAGNOSIS — R52 PAIN ASSOCIATED WITH WOUND: ICD-10-CM

## 2020-12-16 DIAGNOSIS — I87.8 CHRONIC VENOUS STASIS: ICD-10-CM

## 2020-12-16 DIAGNOSIS — L97.929 VENOUS STASIS ULCER OF LEFT LOWER EXTREMITY (HCC): ICD-10-CM

## 2020-12-16 DIAGNOSIS — L08.9 INFECTED SKIN ULCER WITH FAT LAYER EXPOSED (HCC): ICD-10-CM

## 2020-12-16 DIAGNOSIS — L97.921 ULCER OF LEFT LOWER EXTREMITY, LIMITED TO BREAKDOWN OF SKIN (HCC): Primary | ICD-10-CM

## 2020-12-16 DIAGNOSIS — L08.9 WOUND INFECTION: ICD-10-CM

## 2020-12-16 DIAGNOSIS — A49.8 PSEUDOMONAS INFECTION: ICD-10-CM

## 2020-12-16 DIAGNOSIS — L98.492 INFECTED SKIN ULCER WITH FAT LAYER EXPOSED (HCC): ICD-10-CM

## 2020-12-16 DIAGNOSIS — T14.8XXA PAIN ASSOCIATED WITH WOUND: ICD-10-CM

## 2020-12-16 DIAGNOSIS — A49.8 ENTEROCOCCUS FAECALIS INFECTION: ICD-10-CM

## 2020-12-16 DIAGNOSIS — T14.8XXA WOUND INFECTION: ICD-10-CM

## 2020-12-16 PROCEDURE — 11042 DBRDMT SUBQ TIS 1ST 20SQCM/<: CPT

## 2020-12-16 PROCEDURE — 99214 OFFICE O/P EST MOD 30 MIN: CPT | Performed by: NURSE PRACTITIONER

## 2020-12-16 PROCEDURE — 11045 DBRDMT SUBQ TISS EACH ADDL: CPT

## 2020-12-16 PROCEDURE — 11042 DBRDMT SUBQ TIS 1ST 20SQCM/<: CPT | Performed by: NURSE PRACTITIONER

## 2020-12-16 PROCEDURE — 11045 DBRDMT SUBQ TISS EACH ADDL: CPT | Performed by: NURSE PRACTITIONER

## 2020-12-16 RX ORDER — CIPROFLOXACIN 500 MG/1
500 TABLET, FILM COATED ORAL 2 TIMES DAILY
Qty: 14 TAB | Refills: 0 | Status: SHIPPED | OUTPATIENT
Start: 2020-12-16 | End: 2020-12-23

## 2020-12-16 ASSESSMENT — ENCOUNTER SYMPTOMS
SHORTNESS OF BREATH: 0
CONSTIPATION: 0
VOMITING: 0
MYALGIAS: 1
NAUSEA: 0
CONSTIPATION: 0
CHILLS: 0
COUGH: 0
MYALGIAS: 0
COUGH: 0
ABDOMINAL PAIN: 0
SHORTNESS OF BREATH: 0
CLAUDICATION: 0
FEVER: 0
HEADACHES: 0
VOMITING: 0
NERVOUS/ANXIOUS: 0
DIARRHEA: 0
DIARRHEA: 0
NERVOUS/ANXIOUS: 0
FEVER: 0
DEPRESSION: 1
SORE THROAT: 0
CHILLS: 0
NAUSEA: 0
WEAKNESS: 1

## 2020-12-16 NOTE — PROGRESS NOTES
Infectious Disease Clinic    Subjective:     Chief Complaint   Patient presents with   • Follow-Up     Infected LLE     Interval History: 65-year-old male with a PMH of HCV, tobacco abuse, polysubstance abuse, and left Charcot foot in a nondiabetic with a chronic LLE ulcer.  Well known to ID for previous treatment of LLE infected skin ulcer.  Last seen by ID in April/May 2020 for LLE cellulitis and chronic non healing wound +MRSA, GAS & PSAR, treated with PO Zyvox and IV Meropenem Inpt.  Underwent LLE I&D on 5/4, skin graft canceled d/t tendon exposure.  Discharged home on no abx d/t no s/sx of infection.        Being cared for at [facility].  Culture of RLE on 6/19 + GAS, MSSA, Serratia, Proteus and Pseudomonas.  Placed on p.o. Augmentin and Bactrim by KEV Brantley and sent back to ID for review.    7/7/2020: Seen by KEV Kendrick. Returns to ID due to infected lower extremity skin ulcerations.  Wound cultures as noted above.  Continues to be seen at the wound care clinic 3 times a week, wound VAC was reapplied to LLE today.  He has a compression wrap to the RLE.  He states that the wounds are slowly improving and getting smaller, there is granulation, no mild yellowish-pink drainage without any odor.  Finish PO Augmentin and Bactrim as directed.  Wounds improving, no gross s/sx of infection, no additional abx to follow.    On 8/5/2020 visit, foul order was noted from the wounds, along with periwound erythema.  Culture from 7/29 from the wound growing Pseudomonas, Proteus, group A strep, MSSA.  Patient was thus placed on ciprofloxacin and Augmentin.  Patient took these antibiotics for 10 days, noted improvement in swelling and redness of the medial smaller wounds and surroundings.       9/1/2020:  Seen by Dr. Naylor. Per patient's daughter, he has had issues with this wound for more than a couple of years.  On 2 occasions now, after the wound heals, it reopens several months later with  copious drainage.  Patient states he has had venous surgeries performed.  Compression stockings are helping with swelling.  Successfully treated superimposed bacterial infection of chronic left lower extremity wounds.  No further antibiotics indicated at this time.  Low suspicion for an underlying osteomyelitis on exam, but with this history reasonable to obtain an MRI to rule this out.  Will obtain MRI left leg.  Patient does have hardware right leg but underwent an MRI last year.    9/8/2020: MRI was negative for a bone infection.     Today, 12/16/2020:  Referred back to ID d/t worsening infection while on PO Doxy and Amoxicillin; however, based upon prescription given and duration in which pt was taking the medication, it was being taken inappropriately.  Patient states that he tolerated the p.o. doxycycline and amoxicillin without issue.  He also emphatic that he was taking the medication correctly.  Denies feeling generally ill, fevers/chills, general malaise, headache, n/v/d, abdominal pain, chest pain or shortness of breath.  Feels that the LLE is doing a little bit better, there is a little less redness, but still has a lot of drainage.  Continues to be seen at the wound clinic on a weekly basis.  States that he has mild to moderate intermittent LLE pain that is alleviated with rest.    Review of Systems   Constitutional: Negative for chills, fever and malaise/fatigue.   HENT: Negative for sore throat.    Respiratory: Negative for cough and shortness of breath.    Cardiovascular: Positive for leg swelling. Negative for chest pain.   Gastrointestinal: Negative for abdominal pain, constipation, diarrhea, nausea and vomiting.   Genitourinary: Negative for dysuria.   Musculoskeletal: Positive for myalgias. Negative for joint pain.   Skin: Negative for rash.   Neurological: Positive for weakness (Generalized to BLE). Negative for headaches.   Psychiatric/Behavioral: The patient is not nervous/anxious.        Past  Medical History:   Diagnosis Date   • Anxiety    • Charcot's joint of left foot, non-diabetic 3/21/2016   • Hepatitis C, chronic (HCC)    • Hypertension    • Kidney disease    • Migraine    • Polysubstance abuse (HCC) 3/8/2018   • Tobacco use 2016   • Ulcer of left lower extremity with necrosis of muscle (HCC) 3/21/2016   • Venous stasis ulcer (HCC) 2017    bilateral lower extremity       Family History   Problem Relation Age of Onset   • Lung Disease Mother 70         from COPD   • Hypertension Mother    • Other Father 82         from brain aneurysm after fall   • Cancer Neg Hx    • Heart Disease Neg Hx    • Stroke Neg Hx    • Diabetes Neg Hx        Social History     Tobacco Use   • Smoking status: Former Smoker     Packs/day: 0.00     Years: 48.00     Pack years: 0.00     Types: Cigarettes     Quit date: 2018     Years since quittin.0   • Smokeless tobacco: Never Used   • Tobacco comment: states he is using 7mg nicotine patch   Substance Use Topics   • Alcohol use: No     Alcohol/week: 7.2 oz     Types: 12 Cans of beer per week     Comment: history of alcoholism    • Drug use: Yes     Types: Marijuana, Inhaled, Methamphetamines     Comment: MJ every day, intermittent meth use       Allergies: Norco [hydrocodone-acetaminophen]    Pt's medication and problem list reviewed.     Objective:     Resp 14   Ht 1.829 m (6')   Wt 76.2 kg (168 lb) Comment: Reported  BMI 22.78 kg/m²     Physical Exam   Constitutional: He is oriented to person, place, and time and well-developed, well-nourished, and in no distress. No distress.   HENT:   Head: Normocephalic and atraumatic.   Right Ear: External ear normal.   Left Ear: External ear normal.   Eyes: Pupils are equal, round, and reactive to light. EOM are normal. No scleral icterus.   Neck: Normal range of motion. Neck supple. No JVD present. No tracheal deviation present.   Cardiovascular: Normal rate, regular rhythm and normal heart sounds.   No murmur  heard.  Unable to palpate LLE distal pulse.   Pulmonary/Chest: Effort normal and breath sounds normal. No respiratory distress. He has no wheezes.   Abdominal: Soft. Bowel sounds are normal. He exhibits no distension. There is no abdominal tenderness.   Musculoskeletal:         General: Edema (BLE) present. No tenderness.      Comments: Left Charcot foot and ankle  LLE: Full-thickness wounds to calf, generalized pink/yellowish wound bed undertone with minimal serosanguineous drainage, green fluorescent drainage noted on old bandages, no active odor.  LLE anterior lateral- 7.1 x 2.2 x 0.1 cm, wound bed pink  LLE distal posterior medial-2.5 x 3 x 0.2 cm, wound bed pink/yellow  LLE posterior medial proximal-2.3 x 1.5 x 0.1 cm, wound bed pale pink/yellow  LLE anterior-1.1 x 0.5 x 0.1 cm, wound bed pink/red   Neurological: He is alert and oriented to person, place, and time. No cranial nerve deficit.   Skin: Skin is warm and dry. No rash noted. He is not diaphoretic. There is erythema.   Psychiatric: Mood, memory, affect and judgment normal.         Microbiology:  11/18/2020  Source WND    Site Left LE    Susceptibility     Staphylococcus aureus Pseudomonas aeruginosa     SHANON SHANON     Amikacin   <=16 mcg/mL Sensitive     Ampicillin/sulbactam <=8/4 mcg/mL Sensitive       Azithromycin <=2 mcg/mL Sensitive       Cefazolin <=8 mcg/mL Sensitive       Cefepime   >16 mcg/mL Resistant     Ceftaroline <=0.5 mcg/mL Sensitive       Ceftazidime   >16 mcg/mL Resistant     Ciprofloxacin   <=1 mcg/mL Sensitive     Clindamycin <=0.5 mcg/mL Sensitive       Daptomycin <=1 mcg/mL Sensitive       Erythromycin <=0.25 mcg/mL Sensitive       Gentamicin   <=4 mcg/mL Sensitive     Meropenem   <=1 mcg/mL Sensitive     Oxacillin <=0.25 mcg/mL Sensitive       Pip/Tazobactam <=4 mcg/mL Sensitive >64 mcg/mL Resistant     Tetracycline <=4 mcg/mL Sensitive       Tobramycin   <=4 mcg/mL Sensitive     Trimeth/Sulfa <=0.5/9.5 m... Sensitive        Vancomycin 1 mcg/mL Sensitive               Susceptibility     Enterococcus faecalis     SHANON     Ampicillin <=2 mcg/mL Sensitive     Daptomycin <=1 mcg/mL Sensitive     Gent Synergy <=500 mcg/mL Sensitive     Penicillin 2 mcg/mL Sensitive     Vancomycin 1 mcg/mL Sensitive          Assessment and Plan:   The following treatment plan was discussed with patient at length:    1. Infected skin ulcer with fat layer exposed (HCC)  ciprofloxacin (CIPRO) 500 MG Tab    -Will give a 7-day course of p.o. ciprofloxacin 500 mg twice daily to treat the Pseudomonas that was not covered during previous antibiotic management.  Additionally, old bandage showing green fluorescent drainage.  -Emphasized to patient that he needs to be compliant with the ciprofloxacin regiment as it does not appear that he had been doing so with the doxycycline or amoxicillin, being that he reported he completed the antibiotics just a couple of days ago and should have been done on 12/2.  -No need for additional doxycycline or amoxicillin as MSSA and E faecalis should have been efficiently treated and patient reporting some improvement.  -Continue with wound care as directed.   2. Venous stasis ulcer of left lower extremity (HCC)      As above   3. Pseudomonas infection  ciprofloxacin (CIPRO) 500 MG Tab    Ciprofloxacin as above   4. MSSA (methicillin susceptible Staphylococcus aureus) infection      Treated with doxycycline and amoxicillin   5. Enterococcus faecalis infection      Treated with amoxicillin     Follow up: PRN, RTC sooner if needed. FU with PCP for ongoing chronic medical conditions.     KALLIE Díaz.    Case discussed with KEV Luna with [facility].       Please note that this dictation was created using voice recognition software. I have  worked with technical experts from [facility] to optimize the interface.  I have made every reasonable attempt to correct obvious errors, but there may be errors  of grammar and possibly content that I did not discover before finalizing the note.

## 2020-12-17 VITALS — BODY MASS INDEX: 22.75 KG/M2 | WEIGHT: 168 LBS | RESPIRATION RATE: 14 BRPM | HEIGHT: 72 IN

## 2020-12-17 DIAGNOSIS — D64.9 NORMOCYTIC ANEMIA: ICD-10-CM

## 2020-12-17 RX ORDER — ASCORBIC ACID 500 MG
TABLET ORAL
Qty: 30 TAB | Refills: 5 | Status: SHIPPED | OUTPATIENT
Start: 2020-12-17 | End: 2022-02-17

## 2020-12-17 ASSESSMENT — FIBROSIS 4 INDEX: FIB4 SCORE: 1.48

## 2020-12-17 NOTE — PROGRESS NOTES
Provider Encounter- Full Thickness wound    HISTORY OF PRESENT ILLNESS  Wound History:    START OF CARE IN CLINIC: 5/20/2020               REFERRING PROVIDER: KEV Keith                 WOUND ETIOLOGY: venous,  likely mixed etiology              LOCATION: Left lower leg, multiple wounds                                   Right lower leg, multiple wounds-first observed in clinic on initial evaluation, 6/5/2020.  Resolved 9/2/2020                HISTORY: Patient is very well-known to this clinic from previous treatment of wounds to his left lower extremity.  He was discharged from the clinic in March 2020 due to full resolution of his wound.  He returned to the clinic approximately 3 weeks later, on 4/16, with cellulitis, edema, and large open wounds to his left lower leg, and was sent directly to [facility] emergency room.  He was admitted for IV antibiotics and surgical intervention.  On 4/22/2020, he underwent an I&D of his left lower extremity, fasciotomy, and VAC placement.  In the following weeks he underwent surgery 3 more times for irrigation and debridement.  He was discharged home on 5/21, on the VAC, with home health and a referral to [facility].       Pertinent Medical History:  Anxiety, Charcot's joint of left foot, non-diabetic (3/21/2016), Chronic congestive heart failure (HCC) (11/16/2017), Hepatitis C, chronic (HCC), Hypertension, Migraine, Polysubstance abuse (HCC) (3/8/2018), Tobacco use (4/18/2016), Ulcer of left lower extremity with necrosis of muscle (HCC) (3/21/2016), and Venous stasis ulcer ([facility]) (2017).                ETIOLOGY HISTORY:  Vascular Surgeon: Dr. White. Compression Circ-aid. Varicose Veins none visible     TOBACCO USE: Patient denies; patient also denies alcohol and recreational drug use    Patient's problem list, allergies, and current medications reviewed and updated in Epic    Interval History:  6/5/2020: Initial clinic visit with KEV Brantley.  Patient  "returns to clinic after a long hospitalization.  The VAC was held by [facility] due to severe maceration and deterioration of wound beds.  He also presents today with new wounds to his right lower extremity.  He states he does not know how or when these occurred, states, \"they just popped up\".  He is uncertain whether or not he has follow-up appointments with his surgeon.  He does not know what medications he is on.      6/12/2020 : Clinic visit with KEV Brantley.  Goran states he is feeling well today,denies fevers, chills, nausea, vomiting, cough or shortness of breath.  Condition of his periwound skin is significantly improved.  I would like to restart VAC, however he did not bring the VAC with him, and that has already been on for 2-1/2 weeks.  Will likely need to be reordered.  Critical access hospital continues to see him several times per week for dressing changes.  He is tolerating compression without any difficulty.    6/19/2020 : Clinic visit with KEV Brantley.   Goran states he is feeling well today,denies fevers, chills, nausea, vomiting, cough or shortness of breath.  He brought his VAC with him today.  However,  too much slough on wound beds to consider re-applying.  Critical access hospital continues to see him 2 times per week.  I have asked him to bring the VAC with him again next week, and will hopefully place it at that time.  Per patient, dressings are saturated between dressing changes.    6/26/2020 : Clinic visit with KEV Brantley.   Goran states he is feeling well today,denies fevers, chills, nausea, vomiting, cough or shortness of breath.  He brought his VAC with him again today, however we are not able to put it on as he is still having twice drainage.  Sensitivities from culture still pending.  However I did start him on Augmentin today which we are able to obtain from [facility] prior to his leaving the clinic today and had him take one before he left.  [facility] is been seen 2 times per week " in between clinic visits.  We will try and get them to see him over the weekend to do an additional dressing change.  Will adjust antibiotics once sensitivities are known.  Hopefully start VAC next clinic visit.    7/2/2020 : Clinic visit with KEV Brantley.    Goran states he is feeling well today,denies fevers, chills, nausea, vomiting, cough or shortness of breath.  His VAC is here in the clinic, however he does not have another home health appointment until Monday, 4 days from now.  He has been taking Augmentin as prescribed last week.  I informed him that I added Bactrim DS after I got sensitivity results, and that it should be waiting for him at his pharmacy.    7/9/2020: Clinic visit with KEV Luna. Patient states that they are feeling well today.  Patient denies fever, chills, nausea, vomiting, lightheadedness, dizziness, shortness of breath and chest pain.  All wound beds debrided in clinic today patient's wounds continue to progress beefy red granulation tissue to left lower extremity lateral wound tendon is being covered with granulation tissue continue with wound VAC and current plan of care.    7/15/2020 : Clinic visit with KEV Brantley.  Goran states he is feeling well today,denies fevers, chills, nausea, vomiting, cough or shortness of breath.  Home health continues to see him in between clinic visits.  He states he has not been smoking, drinking or using any street drugs.    7/22/2020 : Clinic visit with KEV Brantley.   Goran states he is feeling well today,denies fevers, chills, nausea, vomiting, cough or shortness of breath.  Home health continues to see him in between clinic visits.    7/29/2020: Clinic visit with KEV Luna. Patient states that they are feeling well today.  Patient denies fever, chills, nausea, vomiting, lightheadedness, dizziness, shortness of breath and chest pain.    8/5/2020: Clinic visit with KEV Luna. Patient  states that they are feeling well today.  Patient denies fever, chills, nausea, vomiting, lightheadedness, dizziness, shortness of breath and chest pain.  Patient's wounds are foul-smelling today.  Patient had positive culture we have already ordered the patient Cipro and Augmentin patient reports that he got my message from last night.  Patient reports that he is can  antibiotics tonight in the ED initiate them.  Patient has been referred to infectious disease due to multiple bacterial growth and for their expert opinion.    8/20/2020 : Clinic visit with KEV Brantley.  Goran states he is feeling well, denies fevers, chills, nausea, vomiting, cough or shortness of breath.  He states he has not yet heard from ID.  However, review of referral note indicates that a message was left on his phone earlier today.  Kodi states he will check his message and will call them back.  He completed his antibiotics 2 days ago.    9/2/2020 : Clinic visit with KEV Brantley.   Goran states he is feeling well, denies fevers, chills, nausea, vomiting, cough or shortness of breath.  He was seen by ID yesterday, no new antibiotics ordered, however MRI ordered to assess for OM.  Goran has an appoint with his PCP tomorrow.    9/9/2020 : Clinic visit with KEV Brantley.  Goran states he is feeling well, denies fevers, chills, nausea, vomiting, cough or shortness of breath.  Reviewed MRI results with him in clinic today, no evidence of OM.    9/16/2020 : Clinic visit with KEV Brantley.  Goran presents today looking more disheveled than usual, he wore sunglasses throughout the visit.  His speech is a bit pressured and disorganized.  He denies using any street drugs or drinking alcohol.  His dressings are saturated, and his wounds have deteriorated significantly.  He denies fevers, chills, nausea, vomiting, cough or shortness of breath.  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  Home health continues to see him in between clinic visits for dressing changes.    9/30/2020 : Clinic visit with KEV Brantley.  Goran states he is feeling well today, denies fevers, chills, nausea, vomiting, cough or shortness of breath.  He missed his appointment last week, arrived too late and could not be seen.  He is again wearing his sunglasses throughout today's visit.  He denies using any street drugs or drinking alcohol.  Home health continues to see him in between clinic visits.    10/9/2020: Clinic visit with Abeba ANGULO. Pt denies fevers, chills, nausea, vomiting. Followed by HH. Presents with foul smelling fluorescent green/yellow drainage from wounds and on dressings.     10/21/2020 : Clinic visit with KEV Brantley.  Goran states he is feeling well today, denies fevers, chills, nausea, vomiting, cough or shortness of breath.  He completed his antibiotics a few days ago.  He has not been showering with his wound open, states his house has some, \"plumbing issues\".  Wound has been treated with daily Dakin's moistened gauze.  Patient has been able to change dressing in between clinic visits.    10/28/2020: Clinic visit with KEV Luna. Patient states that they are feeling well today.  Patient denies fever, chills, nausea, vomiting, lightheadedness, dizziness, shortness of breath and chest pain.  We will hold Dakin's application for 1 week.  Left anterior lower extremity wound is progressing however slowly, appears to have red beefy granulation tissue forming.  Medial and posterior wounds continue to wax and wane.      11/11/2020 : Clinic visit with KEV Brantley.  Goran states he is feeling well today, denies fevers, chills, nausea, vomiting, cough or shortness of breath.  Normally home health sees him 2 times per week in between clinic visits.  However, one of his appointments " "was canceled due to illness of home health RN, and he went 5 days in between dressing changes.  He states that during this time the drainage and odor increase significantly.       11/18/2020 : Clinic visit with KEV Brantley.  Goran states that he is feeling well today, denies fevers, chills, nausea, vomiting, cough or shortness of breath.  Denies increasing pain in his left lower extremity states that he noticed more drainage over the past several days.  Home health continues to see him 2 times per week between clinic visits.    11/25/2020 : Clinic visit with JOHNSON Brantley states he is feeling well today, denies fevers, chills, nausea, vomiting, cough or shortness of breath.  He has been taking doxycycline as prescribed last week.  Discussed culture results in clinic today.  Advised him that I would be prescribing amoxicillin in addition to the doxycycline.      12/2/2020 : Clinic visit with JOHNSON Brantley states he is feeling well today, denies fevers, chills, nausea, vomiting, cough or shortness of breath.  He does seem a bit out of sorts today, speech pressured and tangential.  He is wearing his sunglasses throughout today's visit.      12/9/2020 : Clinic visit with JOHNSON Brantley states he is feeling well today, denies fevers, chills, nausea, vomiting, cough or shortness of breath.  His mentation seems much more normal today.  I did question him about his behavior last clinic visit.  He denies using any street drugs, attributed his behavior to his antibiotics..  He did however mention that his wounds always do better with narcotics.  He states he has pain from his wounds and his ankle, \"all the time\".  I informed him that we do not do maintenance narcotics out of this clinic.  Offered referral to pain management, which he stated he would appreciate.  He is also states that he still taking his antibiotics, though he should have completed amoxicillin a week ago, and " doxycycline 11 days ago.    12/16/2020: Clinic visit with KEV Luna. Patient states that they are feeling well today.  Patient denies fever, chills, nausea, vomiting, lightheadedness, dizziness, shortness of breath and chest pain.  Patient seen in clinic today has a significant amount of fluorescent green drainage on wound bandages and over left lower extremity.  Patient culture was positive for Pseudomonas. A 7-day course of Cipro ordered by Jennifer Robb infectious disease specialist.  Patient explained the importance of proper administration of Cipro to help promote wound healing.    REVIEW OF SYSTEMS:   Review of Systems   Constitutional: Negative for chills and fever.        States he is eating well, appetite normal   Respiratory: Negative for cough and shortness of breath.    Cardiovascular: Positive for leg swelling. Negative for chest pain and claudication.        Chronic swelling in legs, for several years   Gastrointestinal: Negative for constipation, diarrhea, nausea and vomiting.   Genitourinary: Negative for dysuria.   Musculoskeletal: Positive for joint pain. Negative for myalgias.        Chronic   Psychiatric/Behavioral: Positive for depression. The patient is not nervous/anxious.        PHYSICAL EXAMINATION:   There were no vitals taken for this visit.    Physical Exam   Constitutional: He is oriented to person, place, and time and well-developed, well-nourished, and in no distress.   HENT:   Head: Normocephalic and atraumatic.   Eyes: Pupils are equal, round, and reactive to light. Conjunctivae and EOM are normal.   Neck: Neck supple.   Cardiovascular: Intact distal pulses.   Pulmonary/Chest: Effort normal.   Musculoskeletal: Normal range of motion.         General: Deformity and edema present.      Comments: Left charcot foot and ankle  1+pitting edema to bilateral LE   Neurological: He is alert and oriented to person, place, and time.   Skin: Skin is warm. There is erythema.   Large  full-thickness wounds to left lower extremities  Refer to wound flowsheet and photos     Psychiatric: Mood, affect and judgment normal.       WOUND ASSESSMENT            Wound 06/05/20 Full Thickness Wound Leg Left LLE anterolateral (Active)   Wound Image    12/16/20 1530   Site Assessment Red;Yellow 12/16/20 1530   Periwound Assessment Denuded;Edema 12/16/20 1530   Margins Attached edges 12/16/20 1530   Closure Secondary intention 11/11/20 1300   Drainage Amount Large 12/16/20 1530   Drainage Description Serosanguineous;Yellow 12/16/20 1530   Treatments Cleansed;Topical Lidocaine;Provider debridement 12/16/20 1530   Wound Cleansing Foam Cleanser/Washcloth 12/16/20 1530   Periwound Protectant Barrier Paste 12/16/20 1530   Dressing Cleansing/Solutions Not Applicable 12/16/20 1530   Dressing Options Hydrofiber Silver;Absorbent Abdominal Pad;Dry Roll Gauze;Compression Wrap Four Layer 12/16/20 1530   Dressing Changed Changed 12/16/20 1530   Dressing Status Clean;Dry;Intact 12/16/20 1530   Dressing Change/Treatment Frequency Every 48 hrs, and As Needed 12/16/20 1530   Non-staged Wound Description Full thickness 12/16/20 1530   Wound Length (cm) 7.1 cm 12/16/20 1530   Wound Width (cm) 2.2 cm 12/16/20 1530   Wound Depth (cm) 0.1 cm 12/16/20 1530   Wound Surface Area (cm^2) 15.62 cm^2 12/16/20 1530   Wound Volume (cm^3) 1.56 cm^3 12/16/20 1530   Post-Procedure Length (cm) 7.1 cm 12/16/20 1530   Post-Procedure Width (cm) 3.5 cm 12/16/20 1530   Post-Procedure Depth (cm) 0.1 cm 12/16/20 1530   Post-Procedure Surface Area (cm^2) 24.85 cm^2 12/16/20 1530   Post-Procedure Volume (cm^3) 2.48 cm^3 12/16/20 1530   Wound Healing % 97 12/16/20 1530   Wound Bed Granulation (%) 20 % 09/30/20 1400   Wound Bed Epithelium (%) 0 % 07/29/20 1450   Wound Bed Slough (%) 40 % 10/21/20 1413   Wound Bed Eschar (%) 0 % 07/29/20 1450   Wound Bed Granulation (%) - Post-Procedure 100 % 09/30/20 1400   Tunneling (cm) 0 cm 12/16/20 1530   Undermining  (cm) 0 cm 12/16/20 1530   Wound Odor None 12/16/20 1530   Exposed Structures None 12/16/20 1530       Wound 06/05/20 Full Thickness Wound Ankle LLE distal posteromedial (Active)   Wound Image    12/16/20 1530   Site Assessment Red;Yellow;Pink 12/16/20 1530   Periwound Assessment Edema;Denuded 12/16/20 1530   Margins Attached edges 12/16/20 1530   Closure Secondary intention 11/11/20 1300   Drainage Amount Copious 12/16/20 1530   Drainage Description Serosanguineous;Yellow 12/16/20 1530   Treatments Cleansed;Topical Lidocaine;Provider debridement 12/16/20 1530   Wound Cleansing Foam Cleanser/Washcloth 12/16/20 1530   Periwound Protectant Barrier Paste 12/16/20 1530   Dressing Cleansing/Solutions Not Applicable 12/16/20 1530   Dressing Options Hydrofiber Silver;Calcium Alginate;Absorbent Abdominal Pad;Dry Roll Gauze;Compression Wrap Four Layer 12/16/20 1530   Dressing Changed Changed 12/16/20 1530   Dressing Status Clean;Dry;Intact 12/16/20 1530   Dressing Change/Treatment Frequency Every 48 hrs, and As Needed 12/16/20 1530   Non-staged Wound Description Full thickness 12/16/20 1530   Wound Length (cm) 2.5 cm 12/16/20 1530   Wound Width (cm) 3 cm 12/16/20 1530   Wound Depth (cm) 0.2 cm 12/16/20 1530   Wound Surface Area (cm^2) 7.5 cm^2 12/16/20 1530   Wound Volume (cm^3) 1.5 cm^3 12/16/20 1530   Post-Procedure Length (cm) 2.6 cm 12/16/20 1530   Post-Procedure Width (cm) 3.1 cm 12/16/20 1530   Post-Procedure Depth (cm) 0.3 cm 12/16/20 1530   Post-Procedure Surface Area (cm^2) 8.06 cm^2 12/16/20 1530   Post-Procedure Volume (cm^3) 2.42 cm^3 12/16/20 1530   Wound Healing % 74 12/16/20 1530   Wound Bed Granulation (%) 10 % 09/30/20 1400   Wound Bed Epithelium (%) 0 % 07/29/20 1450   Wound Bed Slough (%) 50 % 10/21/20 1413   Wound Bed Eschar (%) 0 % 07/29/20 1450   Wound Bed Granulation (%) - Post-Procedure 100 % 09/30/20 1400   Tunneling (cm) 0 cm 12/16/20 1530   Undermining (cm) 0 cm 12/16/20 1530   Wound Odor None  12/16/20 1530   Exposed Structures None 12/16/20 1530       Wound 06/05/20 Full Thickness Wound Leg Left LLE posteromedial proximal (Active)   Wound Image    12/16/20 1530   Site Assessment Yellow;[place] 12/16/20 1530   Periwound Assessment Edema;Denuded 12/16/20 1530   Margins Attached edges 12/16/20 1530   Closure Secondary intention 11/11/20 1300   Drainage Amount Copious 12/16/20 1530   Drainage Description Serosanguineous;Yellow 12/16/20 1530   Treatments Cleansed;Topical Lidocaine;Provider debridement 12/16/20 1530   Wound Cleansing Foam Cleanser/Washcloth 12/16/20 1530   Periwound Protectant Barrier Paste 12/16/20 1530   Dressing Cleansing/Solutions Not Applicable 12/16/20 1530   Dressing Options Hydrofiber Silver;Calcium Alginate;Absorbent Abdominal Pad;Dry Roll Gauze;Compression Wrap Four Layer 12/16/20 1530   Dressing Changed Changed 12/16/20 1530   Dressing Status Clean;Dry;Intact 12/16/20 1530   Dressing Change/Treatment Frequency Every 48 hrs, and As Needed 12/16/20 1530   Non-staged Wound Description Full thickness 12/16/20 1530   Wound Length (cm) 2.3 cm 12/16/20 1530   Wound Width (cm) 1.5 cm 12/16/20 1530   Wound Depth (cm) 0.1 cm 12/16/20 1530   Wound Surface Area (cm^2) 3.45 cm^2 12/16/20 1530   Wound Volume (cm^3) 0.34 cm^3 12/16/20 1530   Post-Procedure Length (cm) 8 cm 12/16/20 1530   Post-Procedure Width (cm) 7 cm 12/16/20 1530   Post-Procedure Depth (cm) 0.2 cm 12/16/20 1530   Post-Procedure Surface Area (cm^2) 56 cm^2 12/16/20 1530   Post-Procedure Volume (cm^3) 11.2 cm^3 12/16/20 1530   Wound Healing % 96 12/16/20 1530   Wound Bed Granulation (%) 10 % 09/30/20 1400   Wound Bed Epithelium (%) 0 % 07/29/20 1450   Wound Bed Slough (%) 75 % 10/21/20 1413   Wound Bed Eschar (%) 0 % 07/29/20 1450   Wound Bed Granulation (%) - Post-Procedure 100 % 09/30/20 1400   Tunneling (cm) 0 cm 12/16/20 1530   Undermining (cm) 0 cm 12/16/20 1530   Wound Odor None 12/16/20 1530   Exposed Structures None 12/16/20  1530       Wound 12/02/20 Venous Ulcer Leg Anterior Left (Active)   Wound Image    12/16/20 1530   Site Assessment Pink;Yellow 12/16/20 1530   Periwound Assessment Edema;Denuded 12/16/20 1530   Margins Attached edges 12/16/20 1530   Drainage Amount Moderate 12/16/20 1530   Drainage Description Serosanguineous;Yellow 12/16/20 1530   Treatments Cleansed;Topical Lidocaine;Provider debridement 12/16/20 1530   Wound Cleansing Foam Cleanser/Washcloth 12/16/20 1530   Periwound Protectant Barrier Paste 12/16/20 1530   Dressing Cleansing/Solutions Not Applicable 12/16/20 1530   Dressing Options Hydrofiber Silver;Dry Roll Gauze;Compression Wrap Four Layer 12/16/20 1530   Dressing Changed Changed 12/16/20 1530   Dressing Status Clean;Dry;Intact 12/16/20 1530   Dressing Change/Treatment Frequency Every 48 hrs, and As Needed 12/16/20 1530   Non-staged Wound Description Partial thickness 12/16/20 1530   Wound Length (cm) 1.1 cm 12/16/20 1530   Wound Width (cm) 0.5 cm 12/16/20 1530   Wound Depth (cm) 0.1 cm 12/16/20 1530   Wound Surface Area (cm^2) 0.55 cm^2 12/16/20 1530   Wound Volume (cm^3) 0.06 cm^3 12/16/20 1530   Post-Procedure Length (cm) 1.1 cm 12/16/20 1530   Post-Procedure Width (cm) 0.7 cm 12/16/20 1530   Post-Procedure Depth (cm) 0.2 cm 12/16/20 1530   Post-Procedure Surface Area (cm^2) 0.77 cm^2 12/16/20 1530   Post-Procedure Volume (cm^3) 0.15 cm^3 12/16/20 1530   Tunneling (cm) 0 cm 12/16/20 1530   Undermining (cm) 0 cm 12/16/20 1530   Wound Odor None 12/16/20 1530   Exposed Structures None 12/16/20 1530            PROCEDURE: Excisional debridement of left lower extremity wounds   -2% viscous lidocaine applied topically to wound beds for approximately 10 minutes prior to debridement  -Curette used to excise thick layer of slough from wound beds.  Excisional debridement was performed to remove devitalized tissue from all other wounds until healthy, bleeding tissue was visualized.  Total area debrided 89.68 cm² tissue  debrided into the subcutaneous layer of all wounds.    -Bleeding controlled with manual pressure.    -Wounds cleansed with normal saline  -Patient tolerated the procedure well, without c/o pain or discomfort.         Pertinent Labs and Diagnostics:    Labs:     A1c:   Lab Results   Component Value Date/Time    HBA1C 5.7 (H) 2020 11:22 AM      Imagin/3/2020-MRI left tibia-fibula with and without contrast  IMPRESSION:     Moderate lateral leg cellulitis and underlying myositis     No abscess or osteomyelitis    VASCULAR STUDIES: BHUPINDER 2019   Right.    Doppler waveform of the common femoral artery is of high amplitude and    triphasic.    Doppler waveforms at the ankle are brisk and triphasic.    Ankle-brachial index is normal.       Left.    Could not perform ankle-brachial index due to large blisters/ulcers.   Normal toe-brachial index: 0.94   Doppler waveform of the common femoral artery is of high amplitude and    triphasic.    Biphasic waveforms seen at the ankle.     WOUND CULTURE:    2020: Culture collected in clinic  Culture Result Abnormal   Staphylococcus aureus   Light growth     Culture Result Abnormal   Pseudomonas aeruginosa   Light growth     Culture Result Abnormal   Enterococcus faecalis   Light growth              ASSESSMENT AND PLAN:     1. Skin ulcer of left lower leg with fat layer exposed (HCC)  Comment: Patient has history of recurring ulcerations to left lower extremity.  Has undergone ablation procedures by Dr. White.  Discharged from [facility] in 2020 due to healing, returned approximately 3 weeks later much worse.  Hospitalized from  until 2020 underwent multiple surgical debridements.    2020: Drainage remains heavy, skin periwound skin denuded.  Fluorescent drainage to dressings most probable Pseudomonas we will treat with Cipro starting 2020  -Excisional debridement of wounds in clinic today, medically necessary to promote wound  healing.  -Patient to return to clinic weekly for assessment and debridement  -Home health to change dressing 1-2 times per week in between clinic visits      Wound care: Hydrofiber silver, calcium alginate, absorbent abdominal pad, dry roll gauze, compression 4 layer wrap     2. Chronic venous stasis  Comments: Hemosiderin staining, brawny edema, scarring- findings consistent with CVI.  Pt established at [facility], underwent ablation procedures in 2019.     -Consider referral back to vascular if wounds failed to progress or deteriorate.    3. Acquired deformity of left ankle and foot  Comments: Charcot deformity of foot and ankle.  Very little ROM of ankle.  Patient was referred to orthopedics previously, however did not make appointment.      -Patient will need to follow-up with orthopedics once wounds are healed.    4.  Wound infection     12/16/2020: Patient was prescribed doxycycline on 11/18 and then amoxicillin on 11/25 for positive culture results.  He states he is still taking these, however should have completed these over week ago.  Wound appeared to have improved at first, however now appear to be deteriorating.  -Jennifer Robb came to clinical appointment today 12/16/2020 discussed antibiotic options.  Decision was made to give the patient a 7-day course of Cipro due to fluorescent green exudate probable Pseudomonas positive with culture.  - Pt advised to go to ER for any increased redness, swelling, drainage or odor, or if he develops fever, chills, nausea or vomiting.      5.  Pain associated with wound    12/16/2020:   -2% viscous lidocaine applied topically to wound bed for approximately 5 minutes prior to debridement  -Patient tolerated procedure today with no complaints of discomfort.      Please note that this note may have been created using voice recognition software. I have worked with technical experts from [email or web address] ([email or web address] to optimize the interface.  I have made every reasonable attempt to  correct obvious errors, but there may be errors of grammar and possibly content that I did not discover before finalizing the note.    N

## 2020-12-18 NOTE — TELEPHONE ENCOUNTER
Received request via: Pharmacy    Was the patient seen in the last year in this department? Yes    Does the patient have an active prescription (recently filled or refills available) for medication(s) requested? No   Future Appointments       Provider Department Center    12/23/2020 10:30 AM [facility] [address] [place] [address]    12/30/2020 2:00 PM [facility] [address] [place] [address]    1/6/2021 1:30 PM [facility] [address] [place] [address]    1/13/2021 1:00 PM [facility] [address] [place] [address]    1/20/2021 1:00 PM [facility] [address] [place] [address]    1/27/2021 1:00 PM [facility] [address] [place] [address]

## 2020-12-23 ENCOUNTER — OFFICE VISIT (OUTPATIENT)
Dept: WOUND CARE | Facility: MEDICAL CENTER | Age: 65
End: 2020-12-23
Attending: NURSE PRACTITIONER
Payer: MEDICARE

## 2020-12-23 VITALS
SYSTOLIC BLOOD PRESSURE: 129 MMHG | OXYGEN SATURATION: 97 % | DIASTOLIC BLOOD PRESSURE: 87 MMHG | HEART RATE: 96 BPM | TEMPERATURE: 98.9 F | RESPIRATION RATE: 20 BRPM

## 2020-12-23 DIAGNOSIS — I83.029 VENOUS STASIS ULCER OF LEFT LOWER EXTREMITY (HCC): Primary | ICD-10-CM

## 2020-12-23 DIAGNOSIS — R60.0 LOWER EXTREMITY EDEMA: ICD-10-CM

## 2020-12-23 DIAGNOSIS — R23.8 DENUDED SKIN: ICD-10-CM

## 2020-12-23 DIAGNOSIS — L97.929 VENOUS STASIS ULCER OF LEFT LOWER EXTREMITY (HCC): Primary | ICD-10-CM

## 2020-12-23 PROCEDURE — 11042 DBRDMT SUBQ TIS 1ST 20SQCM/<: CPT

## 2020-12-23 PROCEDURE — 11045 DBRDMT SUBQ TISS EACH ADDL: CPT

## 2020-12-23 PROCEDURE — 11042 DBRDMT SUBQ TIS 1ST 20SQCM/<: CPT | Performed by: NURSE PRACTITIONER

## 2020-12-23 PROCEDURE — 11045 DBRDMT SUBQ TISS EACH ADDL: CPT | Performed by: NURSE PRACTITIONER

## 2020-12-23 ASSESSMENT — ENCOUNTER SYMPTOMS
CONSTIPATION: 0
NERVOUS/ANXIOUS: 0
CHILLS: 0
FEVER: 0
DEPRESSION: 1
VOMITING: 0
SHORTNESS OF BREATH: 0
COUGH: 0
NAUSEA: 0
CLAUDICATION: 0
MYALGIAS: 0
DIARRHEA: 0

## 2020-12-23 ASSESSMENT — PAIN SCALES - GENERAL: PAINLEVEL: 6=MODERATE PAIN

## 2020-12-23 NOTE — PROGRESS NOTES
Provider Encounter- Full Thickness wound    HISTORY OF PRESENT ILLNESS  Wound History:    START OF CARE IN CLINIC: 5/20/2020               REFERRING PROVIDER: KEV Keith                 WOUND ETIOLOGY: venous,  likely mixed etiology              LOCATION: Left lower leg, multiple wounds                                   Right lower leg, multiple wounds-first observed in clinic on initial evaluation, 6/5/2020.  Resolved 9/2/2020                HISTORY: Patient is very well-known to this clinic from previous treatment of wounds to his left lower extremity.  He was discharged from the clinic in March 2020 due to full resolution of his wound.  He returned to the clinic approximately 3 weeks later, on 4/16, with cellulitis, edema, and large open wounds to his left lower leg, and was sent directly to [facility] emergency room.  He was admitted for IV antibiotics and surgical intervention.  On 4/22/2020, he underwent an I&D of his left lower extremity, fasciotomy, and VAC placement.  In the following weeks he underwent surgery 3 more times for irrigation and debridement.  He was discharged home on 5/21, on the VAC, with home health and a referral to [facility].       Pertinent Medical History:  Anxiety, Charcot's joint of left foot, non-diabetic (3/21/2016), Chronic congestive heart failure (HCC) (11/16/2017), Hepatitis C, chronic (HCC), Hypertension, Migraine, Polysubstance abuse (HCC) (3/8/2018), Tobacco use (4/18/2016), Ulcer of left lower extremity with necrosis of muscle (HCC) (3/21/2016), and Venous stasis ulcer ([facility]) (2017).                ETIOLOGY HISTORY:  Vascular Surgeon: Dr. White. Compression Circ-aid. Varicose Veins none visible     TOBACCO USE: Patient denies; patient also denies alcohol and recreational drug use    Patient's problem list, allergies, and current medications reviewed and updated in Epic    Interval History:  6/5/2020: Initial clinic visit with KEV Brantley.  Patient  "returns to clinic after a long hospitalization.  The VAC was held by [facility] due to severe maceration and deterioration of wound beds.  He also presents today with new wounds to his right lower extremity.  He states he does not know how or when these occurred, states, \"they just popped up\".  He is uncertain whether or not he has follow-up appointments with his surgeon.  He does not know what medications he is on.      6/12/2020 : Clinic visit with KEV Brantley.  Goran states he is feeling well today,denies fevers, chills, nausea, vomiting, cough or shortness of breath.  Condition of his periwound skin is significantly improved.  I would like to restart VAC, however he did not bring the VAC with him, and that has already been on for 2-1/2 weeks.  Will likely need to be reordered.  [facility] continues to see him several times per week for dressing changes.  He is tolerating compression without any difficulty.    6/19/2020 : Clinic visit with KEV Brantley.   Goran states he is feeling well today,denies fevers, chills, nausea, vomiting, cough or shortness of breath.  He brought his VAC with him today.  However,  too much slough on wound beds to consider re-applying.  [facility] continues to see him 2 times per week.  I have asked him to bring the VAC with him again next week, and will hopefully place it at that time.  Per patient, dressings are saturated between dressing changes.    6/26/2020 : Clinic visit with KEV Brantley.   Goran states he is feeling well today,denies fevers, chills, nausea, vomiting, cough or shortness of breath.  He brought his VAC with him again today, however we are not able to put it on as he is still having twice drainage.  Sensitivities from culture still pending.  However I did start him on Augmentin today which we are able to obtain from [facility] prior to his leaving the clinic today and had him take one before he left.  [facility] is been seen 2 times per week " in between clinic visits.  We will try and get them to see him over the weekend to do an additional dressing change.  Will adjust antibiotics once sensitivities are known.  Hopefully start VAC next clinic visit.    7/2/2020 : Clinic visit with KEV Brantley.    Goran states he is feeling well today,denies fevers, chills, nausea, vomiting, cough or shortness of breath.  His VAC is here in the clinic, however he does not have another home health appointment until Monday, 4 days from now.  He has been taking Augmentin as prescribed last week.  I informed him that I added Bactrim DS after I got sensitivity results, and that it should be waiting for him at his pharmacy.    7/9/2020: Clinic visit with KEV Luna. Patient states that they are feeling well today.  Patient denies fever, chills, nausea, vomiting, lightheadedness, dizziness, shortness of breath and chest pain.  All wound beds debrided in clinic today patient's wounds continue to progress beefy red granulation tissue to left lower extremity lateral wound tendon is being covered with granulation tissue continue with wound VAC and current plan of care.    7/15/2020 : Clinic visit with KEV Brantley.  Goran states he is feeling well today,denies fevers, chills, nausea, vomiting, cough or shortness of breath.  Home health continues to see him in between clinic visits.  He states he has not been smoking, drinking or using any street drugs.    7/22/2020 : Clinic visit with KEV Brantley.   Goran states he is feeling well today,denies fevers, chills, nausea, vomiting, cough or shortness of breath.  Home health continues to see him in between clinic visits.    7/29/2020: Clinic visit with KEV Luna. Patient states that they are feeling well today.  Patient denies fever, chills, nausea, vomiting, lightheadedness, dizziness, shortness of breath and chest pain.    8/5/2020: Clinic visit with KEV Luna. Patient  states that they are feeling well today.  Patient denies fever, chills, nausea, vomiting, lightheadedness, dizziness, shortness of breath and chest pain.  Patient's wounds are foul-smelling today.  Patient had positive culture we have already ordered the patient Cipro and Augmentin patient reports that he got my message from last night.  Patient reports that he is can  antibiotics tonight in the ED initiate them.  Patient has been referred to infectious disease due to multiple bacterial growth and for their expert opinion.    8/20/2020 : Clinic visit with KEV Brantley.  Goran states he is feeling well, denies fevers, chills, nausea, vomiting, cough or shortness of breath.  He states he has not yet heard from ID.  However, review of referral note indicates that a message was left on his phone earlier today.  Kodi states he will check his message and will call them back.  He completed his antibiotics 2 days ago.    9/2/2020 : Clinic visit with KEV Brantley.   Goran states he is feeling well, denies fevers, chills, nausea, vomiting, cough or shortness of breath.  He was seen by ID yesterday, no new antibiotics ordered, however MRI ordered to assess for OM.  Goran has an appoint with his PCP tomorrow.    9/9/2020 : Clinic visit with KEV Brantley.  Goran states he is feeling well, denies fevers, chills, nausea, vomiting, cough or shortness of breath.  Reviewed MRI results with him in clinic today, no evidence of OM.    9/16/2020 : Clinic visit with KEV Brantley.  Goran presents today looking more disheveled than usual, he wore sunglasses throughout the visit.  His speech is a bit pressured and disorganized.  He denies using any street drugs or drinking alcohol.  His dressings are saturated, and his wounds have deteriorated significantly.  He denies fevers, chills, nausea, vomiting, cough or shortness of breath.  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  Home health continues to see him in between clinic visits for dressing changes.    9/30/2020 : Clinic visit with KEV Brantley.  Goran states he is feeling well today, denies fevers, chills, nausea, vomiting, cough or shortness of breath.  He missed his appointment last week, arrived too late and could not be seen.  He is again wearing his sunglasses throughout today's visit.  He denies using any street drugs or drinking alcohol.  Home health continues to see him in between clinic visits.    10/9/2020: Clinic visit with Abeba ANGULO. Pt denies fevers, chills, nausea, vomiting. Followed by HH. Presents with foul smelling fluorescent green/yellow drainage from wounds and on dressings.     10/21/2020 : Clinic visit with KEV Brantley.  Goarn states he is feeling well today, denies fevers, chills, nausea, vomiting, cough or shortness of breath.  He completed his antibiotics a few days ago.  He has not been showering with his wound open, states his house has some, \"plumbing issues\".  Wound has been treated with daily Dakin's moistened gauze.  Patient has been able to change dressing in between clinic visits.    10/28/2020: Clinic visit with KEV Luna. Patient states that they are feeling well today.  Patient denies fever, chills, nausea, vomiting, lightheadedness, dizziness, shortness of breath and chest pain.  We will hold Dakin's application for 1 week.  Left anterior lower extremity wound is progressing however slowly, appears to have red beefy granulation tissue forming.  Medial and posterior wounds continue to wax and wane.      11/11/2020 : Clinic visit with KEV Brantley.  Goran states he is feeling well today, denies fevers, chills, nausea, vomiting, cough or shortness of breath.  Normally home health sees him 2 times per week in between clinic visits.  However, one of his appointments " "was canceled due to illness of home health RN, and he went 5 days in between dressing changes.  He states that during this time the drainage and odor increase significantly.       11/18/2020 : Clinic visit with KEV Brantley.  Goran states that he is feeling well today, denies fevers, chills, nausea, vomiting, cough or shortness of breath.  Denies increasing pain in his left lower extremity states that he noticed more drainage over the past several days.  Home health continues to see him 2 times per week between clinic visits.    11/25/2020 : Clinic visit with JOHNSON Brantley states he is feeling well today, denies fevers, chills, nausea, vomiting, cough or shortness of breath.  He has been taking doxycycline as prescribed last week.  Discussed culture results in clinic today.  Advised him that I would be prescribing amoxicillin in addition to the doxycycline.      12/2/2020 : Clinic visit with JOHNSON Brantley states he is feeling well today, denies fevers, chills, nausea, vomiting, cough or shortness of breath.  He does seem a bit out of sorts today, speech pressured and tangential.  He is wearing his sunglasses throughout today's visit.      12/9/2020 : Clinic visit with JOHNSON Brantley states he is feeling well today, denies fevers, chills, nausea, vomiting, cough or shortness of breath.  His mentation seems much more normal today.  I did question him about his behavior last clinic visit.  He denies using any street drugs, attributed his behavior to his antibiotics..  He did however mention that his wounds always do better with narcotics.  He states he has pain from his wounds and his ankle, \"all the time\".  I informed him that we do not do maintenance narcotics out of this clinic.  Offered referral to pain management, which he stated he would appreciate.  He is also states that he still taking his antibiotics, though he should have completed amoxicillin a week ago, and " doxycycline 11 days ago.    12/16/2020: Clinic visit with KEV Luna. Patient states that they are feeling well today.  Patient denies fever, chills, nausea, vomiting, lightheadedness, dizziness, shortness of breath and chest pain.  Patient seen in clinic today has a significant amount of fluorescent green drainage on wound bandages and over left lower extremity.  Patient culture was positive for Pseudomonas. A 7-day course of Cipro ordered by Jennifer Robb infectious disease specialist.  Patient explained the importance of proper administration of Cipro to help promote wound healing.    12/23/2020: Clinic visit with KEV Luna. Patient states that they are feeling well today.  Patient denies fever, chills, nausea, vomiting, lightheadedness, dizziness, shortness of breath and chest pain.     REVIEW OF SYSTEMS:   Review of Systems   Constitutional: Negative for chills and fever.        States he is eating well, appetite normal   Respiratory: Negative for cough and shortness of breath.    Cardiovascular: Positive for leg swelling. Negative for chest pain and claudication.        Chronic swelling in legs, for several years   Gastrointestinal: Negative for constipation, diarrhea, nausea and vomiting.   Genitourinary: Negative for dysuria.   Musculoskeletal: Positive for joint pain. Negative for myalgias.        Chronic   Psychiatric/Behavioral: Positive for depression. The patient is not nervous/anxious.        PHYSICAL EXAMINATION:   /87   Pulse 96   Temp 37.2 °C (98.9 °F)   Resp 20   SpO2 97%     Physical Exam   Constitutional: He is oriented to person, place, and time and well-developed, well-nourished, and in no distress.   HENT:   Head: Normocephalic and atraumatic.   Eyes: Pupils are equal, round, and reactive to light. Conjunctivae and EOM are normal.   Neck: Neck supple.   Cardiovascular: Intact distal pulses.   Pulmonary/Chest: Effort normal.   Musculoskeletal: Normal range  of motion.         General: Deformity and edema present.      Comments: Left charcot foot and ankle  1+pitting edema to bilateral LE   Neurological: He is alert and oriented to person, place, and time.   Skin: Skin is warm. There is erythema.   Large full-thickness wounds to left lower extremities  Refer to wound flowsheet and photos     Psychiatric: Mood, affect and judgment normal.       WOUND ASSESSMENT            Wound 06/05/20 Full Thickness Wound Leg Left LLE anterolateral (Active)   Wound Image    12/23/20 1045   Site Assessment Red;Yellow 12/23/20 1045   Periwound Assessment Edema;Denuded 12/23/20 1045   Margins Attached edges 12/23/20 1045   Closure Secondary intention 11/11/20 1300   Drainage Amount Large 12/23/20 1045   Drainage Description Serosanguineous 12/23/20 1045   Treatments Cleansed;Topical Lidocaine;Provider debridement 12/23/20 1045   Wound Cleansing Puracyn Spray 12/23/20 1045   Periwound Protectant Barrier Paste 12/23/20 1045   Dressing Cleansing/Solutions Not Applicable 12/16/20 1530   Dressing Options Hydrofiber Silver;Absorbent Abdominal Pad;Compression Wrap Four Layer 12/23/20 1045   Dressing Changed Changed 12/16/20 1530   Dressing Status Clean;Dry;Intact 12/16/20 1530   Dressing Change/Treatment Frequency Every 48 hrs, and As Needed 12/16/20 1530   Non-staged Wound Description Full thickness 12/23/20 1045   Wound Length (cm) 6.9 cm 12/23/20 1045   Wound Width (cm) 2.9 cm 12/23/20 1045   Wound Depth (cm) 0.2 cm 12/23/20 1045   Wound Surface Area (cm^2) 20.01 cm^2 12/23/20 1045   Wound Volume (cm^3) 4 cm^3 12/23/20 1045   Post-Procedure Length (cm) 7 cm 12/23/20 1045   Post-Procedure Width (cm) 2.9 cm 12/23/20 1045   Post-Procedure Depth (cm) 0.2 cm 12/23/20 1045   Post-Procedure Surface Area (cm^2) 20.3 cm^2 12/23/20 1045   Post-Procedure Volume (cm^3) 4.06 cm^3 12/23/20 1045   Wound Healing % 93 12/23/20 1045   Wound Bed Granulation (%) 20 % 09/30/20 1400   Wound Bed Epithelium (%) 0 %  07/29/20 1450   Wound Bed Slough (%) 40 % 10/21/20 1413   Wound Bed Eschar (%) 0 % 07/29/20 1450   Wound Bed Granulation (%) - Post-Procedure 100 % 09/30/20 1400   Tunneling (cm) 0 cm 12/23/20 1045   Undermining (cm) 0 cm 12/23/20 1045   Wound Odor None 12/23/20 1045   Exposed Structures None 12/23/20 1045       Wound 06/05/20 Full Thickness Wound Ankle LLE distal posteromedial (Active)   Wound Image    12/23/20 1045   Site Assessment Red;Yellow 12/23/20 1045   Periwound Assessment Edema;Denuded;Maceration 12/23/20 1045   Margins Attached edges 12/23/20 1045   Closure Secondary intention 11/11/20 1300   Drainage Amount Copious 12/23/20 1045   Drainage Description Serosanguineous 12/23/20 1045   Treatments Cleansed;Topical Lidocaine;Provider debridement 12/23/20 1045   Wound Cleansing Puracyn Spray 12/23/20 1045   Periwound Protectant Barrier Paste 12/23/20 1045   Dressing Cleansing/Solutions Not Applicable 12/16/20 1530   Dressing Options Hydrofiber Silver;Absorbent Abdominal Pad;Compression Wrap Four Layer 12/23/20 1045   Dressing Changed Changed 12/16/20 1530   Dressing Status Clean;Dry;Intact 12/16/20 1530   Dressing Change/Treatment Frequency Every 48 hrs, and As Needed 12/16/20 1530   Non-staged Wound Description Full thickness 12/23/20 1045   Wound Length (cm) 2.5 cm 12/23/20 1045   Wound Width (cm) 3.9 cm 12/23/20 1045   Wound Depth (cm) 0.3 cm 12/23/20 1045   Wound Surface Area (cm^2) 9.75 cm^2 12/23/20 1045   Wound Volume (cm^3) 2.92 cm^3 12/23/20 1045   Post-Procedure Length (cm) 3 cm 12/23/20 1045   Post-Procedure Width (cm) 4 cm 12/23/20 1045   Post-Procedure Depth (cm) 0.3 cm 12/23/20 1045   Post-Procedure Surface Area (cm^2) 12 cm^2 12/23/20 1045   Post-Procedure Volume (cm^3) 3.6 cm^3 12/23/20 1045   Wound Healing % 50 12/23/20 1045   Wound Bed Granulation (%) 10 % 09/30/20 1400   Wound Bed Epithelium (%) 0 % 07/29/20 1450   Wound Bed Slough (%) 50 % 10/21/20 1413   Wound Bed Eschar (%) 0 % 07/29/20  1450   Wound Bed Granulation (%) - Post-Procedure 100 % 09/30/20 1400   Tunneling (cm) 0 cm 12/23/20 1045   Undermining (cm) 0 cm 12/23/20 1045   Wound Odor None 12/23/20 1045   Exposed Structures None 12/23/20 1045       Wound 06/05/20 Full Thickness Wound Leg Left LLE posteromedial proximal (Active)   Wound Image    12/23/20 1045   Site Assessment Red;Yellow 12/23/20 1045   Periwound Assessment Edema;Denuded;Maceration 12/23/20 1045   Margins Attached edges 12/23/20 1045   Closure Secondary intention 11/11/20 1300   Drainage Amount Copious 12/23/20 1045   Drainage Description Serosanguineous 12/23/20 1045   Treatments Cleansed;Topical Lidocaine;Provider debridement 12/23/20 1045   Wound Cleansing Puracyn Spray 12/23/20 1045   Periwound Protectant Barrier Paste 12/23/20 1045   Dressing Cleansing/Solutions Not Applicable 12/16/20 1530   Dressing Options Hydrofiber Silver;Absorbent Abdominal Pad;Compression Wrap Four Layer 12/23/20 1045   Dressing Changed Changed 12/16/20 1530   Dressing Status Clean;Dry;Intact 12/16/20 1530   Dressing Change/Treatment Frequency Every 48 hrs, and As Needed 12/16/20 1530   Non-staged Wound Description Full thickness 12/23/20 1045   Wound Length (cm) 9 cm 12/23/20 1045   Wound Width (cm) 7.7 cm 12/23/20 1045   Wound Depth (cm) 0.3 cm 12/23/20 1045   Wound Surface Area (cm^2) 69.3 cm^2 12/23/20 1045   Wound Volume (cm^3) 20.79 cm^3 12/23/20 1045   Post-Procedure Length (cm) 9 cm 12/23/20 1045   Post-Procedure Width (cm) 8 cm 12/23/20 1045   Post-Procedure Depth (cm) 0.3 cm 12/23/20 1045   Post-Procedure Surface Area (cm^2) 72 cm^2 12/23/20 1045   Post-Procedure Volume (cm^3) 21.6 cm^3 12/23/20 1045   Wound Healing % -175 12/23/20 1045   Wound Bed Granulation (%) 10 % 09/30/20 1400   Wound Bed Epithelium (%) 0 % 07/29/20 1450   Wound Bed Slough (%) 75 % 10/21/20 1413   Wound Bed Eschar (%) 0 % 07/29/20 1450   Wound Bed Granulation (%) - Post-Procedure 100 % 09/30/20 1400   Tunneling (cm)  0 cm 20 1045   Undermining (cm) 0 cm 20 1045   Wound Odor None 20 1045   Exposed Structures None 20 1045       PROCEDURE: Excisional debridement of left lower extremity wounds   -2% viscous lidocaine applied topically to wound beds for approximately 10 minutes prior to debridement  -Curette used to excise thick layer of slough from wound beds.  Excisional debridement was performed to remove devitalized tissue from all other wounds until healthy, bleeding tissue was visualized.  Total area debrided 104.3 cm² tissue debrided into the subcutaneous layer of all wounds.    -Bleeding controlled with manual pressure.    -Wounds cleansed with normal saline  -Patient tolerated the procedure well, without c/o pain or discomfort.         Pertinent Labs and Diagnostics:    Labs:     A1c:   Lab Results   Component Value Date/Time    HBA1C 5.7 (H) 2020 11:22 AM      Imagin/3/2020-MRI left tibia-fibula with and without contrast  IMPRESSION:     Moderate lateral leg cellulitis and underlying myositis     No abscess or osteomyelitis    VASCULAR STUDIES: BHUPINDER 2019   Right.    Doppler waveform of the common femoral artery is of high amplitude and    triphasic.    Doppler waveforms at the ankle are brisk and triphasic.    Ankle-brachial index is normal.       Left.    Could not perform ankle-brachial index due to large blisters/ulcers.   Normal toe-brachial index: 0.94   Doppler waveform of the common femoral artery is of high amplitude and    triphasic.    Biphasic waveforms seen at the ankle.     WOUND CULTURE:    2020: Culture collected in clinic  Culture Result Abnormal   Staphylococcus aureus   Light growth     Culture Result Abnormal   Pseudomonas aeruginosa   Light growth     Culture Result Abnormal   Enterococcus faecalis   Light growth              ASSESSMENT AND PLAN:     1. Skin ulcer of left lower leg with fat layer exposed (HCC)  Comment: Patient has history of recurring  ulcerations to left lower extremity.  Has undergone ablation procedures by Dr. White.  Discharged from [facility] in March 2020 due to healing, returned approximately 3 weeks later much worse.  Hospitalized from 4/16 until 5/21/2020 underwent multiple surgical debridements.    12/23/2020: Drainage remains heavy, skin periwound skin denuded.   -Excisional debridement of wounds in clinic today, medically necessary to promote wound healing.  -Patient to return to clinic weekly for assessment and debridement  -Home health to change dressing 1-2 times per week in between clinic visits      Wound care: Hydrofiber silver x2, absorbent abdominal pad, compression wrap 4 layer     2. Chronic venous stasis  Comments: Hemosiderin staining, brawny edema, scarring- findings consistent with CVI.  Pt established at [facility], underwent ablation procedures in 2019.     -Consider referral back to vascular if wounds failed to progress or deteriorate.    3. Acquired deformity of left ankle and foot  Comments: Charcot deformity of foot and ankle.  Very little ROM of ankle.  Patient was referred to orthopedics previously, however did not make appointment.      -Patient will need to follow-up with orthopedics once wounds are healed.    4.  Wound infection     12/23/2020: Patient was prescribed doxycycline on 11/18 and then amoxicillin on 11/25 for positive culture results.  He states he is still taking these, however should have completed these over week ago.    -Patient should finish his course of Cipro on 12/25/2020  - Pt advised to go to ER for any increased redness, swelling, drainage or odor, or if he develops fever, chills, nausea or vomiting.      5.  Pain associated with wound    12/23/2020:   -2% viscous lidocaine applied topically to wound bed for approximately 5 minutes prior to debridement  -Patient tolerated procedure today with no complaints of discomfort.      Please note that this note may have been created using voice  recognition software. I have worked with technical experts from [facility] to optimize the interface.  I have made every reasonable attempt to correct obvious errors, but there may be errors of grammar and possibly content that I did not discover before finalizing the note.    N

## 2020-12-23 NOTE — PATIENT INSTRUCTIONS
-Keep dressings clean and dry. Change dressings if they become over saturated, soiled or fall off.     -Avoid prolonged standing or sitting without elevating your legs.    -Remove your compression garments if you have severe pain, severe swelling, numbness, color change, or temperature change in your toes. If you need to remove your compression garments, do so by unrolling them. Do not cut the compression garments off, this is to prevent cutting yourself on accident.    -Should you experience any significant changes in your wound(s), such as infection (redness, swelling, localized heat, increased pain, fever > 101 F, chills) or have any questions regarding your home care instructions, please contact the wound center at [phone]. If after hours, contact your primary care physician or go to the hospital emergency room.

## 2020-12-30 ENCOUNTER — OFFICE VISIT (OUTPATIENT)
Dept: WOUND CARE | Facility: MEDICAL CENTER | Age: 65
End: 2020-12-30
Attending: NURSE PRACTITIONER
Payer: MEDICARE

## 2020-12-30 VITALS
OXYGEN SATURATION: 98 % | HEART RATE: 97 BPM | SYSTOLIC BLOOD PRESSURE: 169 MMHG | DIASTOLIC BLOOD PRESSURE: 109 MMHG | TEMPERATURE: 97.5 F | RESPIRATION RATE: 16 BRPM

## 2020-12-30 DIAGNOSIS — R52 PAIN ASSOCIATED WITH WOUND: ICD-10-CM

## 2020-12-30 DIAGNOSIS — T14.8XXA PAIN ASSOCIATED WITH WOUND: ICD-10-CM

## 2020-12-30 DIAGNOSIS — L97.922 SKIN ULCER OF LEFT LOWER LEG WITH FAT LAYER EXPOSED (HCC): ICD-10-CM

## 2020-12-30 DIAGNOSIS — I87.8 CHRONIC VENOUS STASIS: Primary | ICD-10-CM

## 2020-12-30 DIAGNOSIS — L08.9 WOUND INFECTION: ICD-10-CM

## 2020-12-30 DIAGNOSIS — T14.8XXA WOUND INFECTION: ICD-10-CM

## 2020-12-30 DIAGNOSIS — M21.962 ACQUIRED DEFORMITY OF LEFT ANKLE AND FOOT: ICD-10-CM

## 2020-12-30 PROCEDURE — 11042 DBRDMT SUBQ TIS 1ST 20SQCM/<: CPT | Performed by: NURSE PRACTITIONER

## 2020-12-30 PROCEDURE — 11042 DBRDMT SUBQ TIS 1ST 20SQCM/<: CPT

## 2020-12-30 PROCEDURE — 11045 DBRDMT SUBQ TISS EACH ADDL: CPT

## 2020-12-30 PROCEDURE — 11045 DBRDMT SUBQ TISS EACH ADDL: CPT | Performed by: NURSE PRACTITIONER

## 2020-12-30 ASSESSMENT — ENCOUNTER SYMPTOMS
FEVER: 0
MYALGIAS: 0
CHILLS: 0
DEPRESSION: 1
CONSTIPATION: 0
DIARRHEA: 0
NERVOUS/ANXIOUS: 0
NAUSEA: 0
COUGH: 0
VOMITING: 0
SHORTNESS OF BREATH: 0
CLAUDICATION: 0

## 2020-12-30 NOTE — PROGRESS NOTES
Provider Encounter- Full Thickness wound    HISTORY OF PRESENT ILLNESS  Wound History:    START OF CARE IN CLINIC: 5/20/2020               REFERRING PROVIDER: KEV Keith                 WOUND ETIOLOGY: venous,  likely mixed etiology              LOCATION: Left lower leg, multiple wounds                                   Right lower leg, multiple wounds-first observed in clinic on initial evaluation, 6/5/2020.  Resolved 9/2/2020                HISTORY: Patient is very well-known to this clinic from previous treatment of wounds to his left lower extremity.  He was discharged from the clinic in March 2020 due to full resolution of his wound.  He returned to the clinic approximately 3 weeks later, on 4/16, with cellulitis, edema, and large open wounds to his left lower leg, and was sent directly to [facility] emergency room.  He was admitted for IV antibiotics and surgical intervention.  On 4/22/2020, he underwent an I&D of his left lower extremity, fasciotomy, and VAC placement.  In the following weeks he underwent surgery 3 more times for irrigation and debridement.  He was discharged home on 5/21, on the VAC, with home health and a referral to [facility].       Pertinent Medical History:  Anxiety, Charcot's joint of left foot, non-diabetic (3/21/2016), Chronic congestive heart failure (HCC) (11/16/2017), Hepatitis C, chronic (HCC), Hypertension, Migraine, Polysubstance abuse (HCC) (3/8/2018), Tobacco use (4/18/2016), Ulcer of left lower extremity with necrosis of muscle (HCC) (3/21/2016), and Venous stasis ulcer ([facility]) (2017).                ETIOLOGY HISTORY:  Vascular Surgeon: Dr. White. Compression Circ-aid. Varicose Veins none visible     TOBACCO USE: Patient denies; patient also denies alcohol and recreational drug use    Patient's problem list, allergies, and current medications reviewed and updated in Epic    Interval History:  6/5/2020: Initial clinic visit with KEV Brantley.  Patient  "returns to clinic after a long hospitalization.  The VAC was held by [facility] due to severe maceration and deterioration of wound beds.  He also presents today with new wounds to his right lower extremity.  He states he does not know how or when these occurred, states, \"they just popped up\".  He is uncertain whether or not he has follow-up appointments with his surgeon.  He does not know what medications he is on.      6/12/2020 : Clinic visit with KEV Brantley.  Goran states he is feeling well today,denies fevers, chills, nausea, vomiting, cough or shortness of breath.  Condition of his periwound skin is significantly improved.  I would like to restart VAC, however he did not bring the VAC with him, and that has already been on for 2-1/2 weeks.  Will likely need to be reordered.  [facility] continues to see him several times per week for dressing changes.  He is tolerating compression without any difficulty.    6/19/2020 : Clinic visit with KEV Brantley.   Goran states he is feeling well today,denies fevers, chills, nausea, vomiting, cough or shortness of breath.  He brought his VAC with him today.  However,  too much slough on wound beds to consider re-applying.  [facility] continues to see him 2 times per week.  I have asked him to bring the VAC with him again next week, and will hopefully place it at that time.  Per patient, dressings are saturated between dressing changes.    6/26/2020 : Clinic visit with KEV Brantley.   Goran states he is feeling well today,denies fevers, chills, nausea, vomiting, cough or shortness of breath.  He brought his VAC with him again today, however we are not able to put it on as he is still having twice drainage.  Sensitivities from culture still pending.  However I did start him on Augmentin today which we are able to obtain from [facility] prior to his leaving the clinic today and had him take one before he left.  [facility] is been seen 2 times per week " in between clinic visits.  We will try and get them to see him over the weekend to do an additional dressing change.  Will adjust antibiotics once sensitivities are known.  Hopefully start VAC next clinic visit.    7/2/2020 : Clinic visit with KEV Brantley.    Goran states he is feeling well today,denies fevers, chills, nausea, vomiting, cough or shortness of breath.  His VAC is here in the clinic, however he does not have another home health appointment until Monday, 4 days from now.  He has been taking Augmentin as prescribed last week.  I informed him that I added Bactrim DS after I got sensitivity results, and that it should be waiting for him at his pharmacy.    7/9/2020: Clinic visit with KEV Luna. Patient states that they are feeling well today.  Patient denies fever, chills, nausea, vomiting, lightheadedness, dizziness, shortness of breath and chest pain.  All wound beds debrided in clinic today patient's wounds continue to progress beefy red granulation tissue to left lower extremity lateral wound tendon is being covered with granulation tissue continue with wound VAC and current plan of care.    7/15/2020 : Clinic visit with KEV Brantley.  Goran states he is feeling well today,denies fevers, chills, nausea, vomiting, cough or shortness of breath.  Home health continues to see him in between clinic visits.  He states he has not been smoking, drinking or using any street drugs.    7/22/2020 : Clinic visit with KEV Brantley.   Goran states he is feeling well today,denies fevers, chills, nausea, vomiting, cough or shortness of breath.  Home health continues to see him in between clinic visits.    7/29/2020: Clinic visit with KEV Luna. Patient states that they are feeling well today.  Patient denies fever, chills, nausea, vomiting, lightheadedness, dizziness, shortness of breath and chest pain.    8/5/2020: Clinic visit with KEV Luna. Patient  states that they are feeling well today.  Patient denies fever, chills, nausea, vomiting, lightheadedness, dizziness, shortness of breath and chest pain.  Patient's wounds are foul-smelling today.  Patient had positive culture we have already ordered the patient Cipro and Augmentin patient reports that he got my message from last night.  Patient reports that he is can  antibiotics tonight in the ED initiate them.  Patient has been referred to infectious disease due to multiple bacterial growth and for their expert opinion.    8/20/2020 : Clinic visit with KEV Brantley.  Goran states he is feeling well, denies fevers, chills, nausea, vomiting, cough or shortness of breath.  He states he has not yet heard from ID.  However, review of referral note indicates that a message was left on his phone earlier today.  Kodi states he will check his message and will call them back.  He completed his antibiotics 2 days ago.    9/2/2020 : Clinic visit with KEV Brantley.   Goran states he is feeling well, denies fevers, chills, nausea, vomiting, cough or shortness of breath.  He was seen by ID yesterday, no new antibiotics ordered, however MRI ordered to assess for OM.  Goran has an appoint with his PCP tomorrow.    9/9/2020 : Clinic visit with KEV Brantley.  Goran states he is feeling well, denies fevers, chills, nausea, vomiting, cough or shortness of breath.  Reviewed MRI results with him in clinic today, no evidence of OM.    9/16/2020 : Clinic visit with KEV Brantley.  Goran presents today looking more disheveled than usual, he wore sunglasses throughout the visit.  His speech is a bit pressured and disorganized.  He denies using any street drugs or drinking alcohol.  His dressings are saturated, and his wounds have deteriorated significantly.  He denies fevers, chills, nausea, vomiting, cough or shortness of breath.  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  Home health continues to see him in between clinic visits for dressing changes.    9/30/2020 : Clinic visit with KEV Brantley.  Goran states he is feeling well today, denies fevers, chills, nausea, vomiting, cough or shortness of breath.  He missed his appointment last week, arrived too late and could not be seen.  He is again wearing his sunglasses throughout today's visit.  He denies using any street drugs or drinking alcohol.  Home health continues to see him in between clinic visits.    10/9/2020: Clinic visit with Abeba ANGULO. Pt denies fevers, chills, nausea, vomiting. Followed by HH. Presents with foul smelling fluorescent green/yellow drainage from wounds and on dressings.     10/21/2020 : Clinic visit with KEV Brantley.  Goran states he is feeling well today, denies fevers, chills, nausea, vomiting, cough or shortness of breath.  He completed his antibiotics a few days ago.  He has not been showering with his wound open, states his house has some, \"plumbing issues\".  Wound has been treated with daily Dakin's moistened gauze.  Patient has been able to change dressing in between clinic visits.    10/28/2020: Clinic visit with KEV Luna. Patient states that they are feeling well today.  Patient denies fever, chills, nausea, vomiting, lightheadedness, dizziness, shortness of breath and chest pain.  We will hold Dakin's application for 1 week.  Left anterior lower extremity wound is progressing however slowly, appears to have red beefy granulation tissue forming.  Medial and posterior wounds continue to wax and wane.      11/11/2020 : Clinic visit with KEV Brantley.  Goran states he is feeling well today, denies fevers, chills, nausea, vomiting, cough or shortness of breath.  Normally home health sees him 2 times per week in between clinic visits.  However, one of his appointments " "was canceled due to illness of home health RN, and he went 5 days in between dressing changes.  He states that during this time the drainage and odor increase significantly.       11/18/2020 : Clinic visit with KEV Brantley.  Goran states that he is feeling well today, denies fevers, chills, nausea, vomiting, cough or shortness of breath.  Denies increasing pain in his left lower extremity states that he noticed more drainage over the past several days.  Home health continues to see him 2 times per week between clinic visits.    11/25/2020 : Clinic visit with JOHNSON Brantley states he is feeling well today, denies fevers, chills, nausea, vomiting, cough or shortness of breath.  He has been taking doxycycline as prescribed last week.  Discussed culture results in clinic today.  Advised him that I would be prescribing amoxicillin in addition to the doxycycline.      12/2/2020 : Clinic visit with JOHNSON Brantley states he is feeling well today, denies fevers, chills, nausea, vomiting, cough or shortness of breath.  He does seem a bit out of sorts today, speech pressured and tangential.  He is wearing his sunglasses throughout today's visit.      12/9/2020 : Clinic visit with JOHNSON Brantley states he is feeling well today, denies fevers, chills, nausea, vomiting, cough or shortness of breath.  His mentation seems much more normal today.  I did question him about his behavior last clinic visit.  He denies using any street drugs, attributed his behavior to his antibiotics..  He did however mention that his wounds always do better with narcotics.  He states he has pain from his wounds and his ankle, \"all the time\".  I informed him that we do not do maintenance narcotics out of this clinic.  Offered referral to pain management, which he stated he would appreciate.  He is also states that he still taking his antibiotics, though he should have completed amoxicillin a week ago, and " doxycycline 11 days ago.    12/16/2020: Clinic visit with KEV Luna. Patient states that they are feeling well today.  Patient denies fever, chills, nausea, vomiting, lightheadedness, dizziness, shortness of breath and chest pain.  Patient seen in clinic today has a significant amount of fluorescent green drainage on wound bandages and over left lower extremity.  Patient culture was positive for Pseudomonas. A 7-day course of Cipro ordered by Jennifer Robb infectious disease specialist.  Patient explained the importance of proper administration of Cipro to help promote wound healing.    12/23/2020: Clinic visit with KEV Luna. Patient states that they are feeling well today.  Patient denies fever, chills, nausea, vomiting, lightheadedness, dizziness, shortness of breath and chest pain.     12/30/2020 : Clinic visit with KEV Brantley.  Goran states he is feeling well, denies fevers, chills, nausea, vomiting, cough or shortness of breath.  He states that home health is asked him to remove his compression wrap and shower before they come to see him.  He stated that I agreed that this would be helpful for his skin, and encouraged him to do so.  At first stated he was having trouble with his shower, but then stated he would be able to comply.  He states he finished his antibiotics a few days ago.    REVIEW OF SYSTEMS:   Review of Systems   Constitutional: Negative for chills and fever.        States he is eating well, appetite normal   Respiratory: Negative for cough and shortness of breath.    Cardiovascular: Positive for leg swelling. Negative for chest pain and claudication.        Chronic swelling in legs, for several years   Gastrointestinal: Negative for constipation, diarrhea, nausea and vomiting.   Genitourinary: Negative for dysuria.   Musculoskeletal: Positive for joint pain. Negative for myalgias.        Chronic   Psychiatric/Behavioral: Positive for depression. The patient  is not nervous/anxious.        PHYSICAL EXAMINATION:   BP (!) 169/109   Pulse 97   Temp 36.4 °C (97.5 °F) (Temporal)   Resp 16   SpO2 98%     Physical Exam   Constitutional: He is oriented to person, place, and time and well-developed, well-nourished, and in no distress.   HENT:   Head: Normocephalic and atraumatic.   Eyes: Pupils are equal, round, and reactive to light. Conjunctivae and EOM are normal.   Neck: Neck supple.   Cardiovascular: Intact distal pulses.   Pulmonary/Chest: Effort normal.   Musculoskeletal: Normal range of motion.         General: Deformity and edema present.      Comments: Left charcot foot and ankle  1+pitting edema to bilateral LE   Neurological: He is alert and oriented to person, place, and time.   Skin: Skin is warm. There is erythema.   Large full-thickness wounds to left lower extremities  Refer to wound flowsheet and photos     Psychiatric: Mood, affect and judgment normal.       WOUND ASSESSMENT        Wound 06/05/20 Full Thickness Wound Leg Left LLE anterolateral (Active)   Wound Image    12/30/20 1400   Site Assessment Red;Yellow 12/30/20 1400   Periwound Assessment Edema;Denuded 12/30/20 1400   Margins Attached edges 12/30/20 1400   Closure Secondary intention 11/11/20 1300   Drainage Amount Large 12/30/20 1400   Drainage Description Serosanguineous 12/30/20 1400   Treatments Cleansed;Topical Lidocaine;Provider debridement 12/30/20 1400   Wound Cleansing Puracyn Gabriels 12/30/20 1400   Periwound Protectant Skin Moisturizer;Barrier Paste 12/30/20 1400   Dressing Cleansing/Solutions Not Applicable 12/16/20 1530   Dressing Options Hydrofiber Silver;Absorbent Abdominal Pad;Compression Wrap Four Layer 12/30/20 1400   Dressing Changed Changed 12/16/20 1530   Dressing Status Clean;Dry;Intact 12/16/20 1530   Dressing Change/Treatment Frequency Every 48 hrs, and As Needed 12/16/20 1530   Non-staged Wound Description Full thickness 12/30/20 1400   Wound Length (cm) 6 cm 12/30/20 1400    Wound Width (cm) 2.4 cm 12/30/20 1400   Wound Depth (cm) 0.2 cm 12/30/20 1400   Wound Surface Area (cm^2) 14.4 cm^2 12/30/20 1400   Wound Volume (cm^3) 2.88 cm^3 12/30/20 1400   Post-Procedure Length (cm) 6.2 cm 12/30/20 1400   Post-Procedure Width (cm) 2.5 cm 12/30/20 1400   Post-Procedure Depth (cm) 0.2 cm 12/30/20 1400   Post-Procedure Surface Area (cm^2) 15.5 cm^2 12/30/20 1400   Post-Procedure Volume (cm^3) 3.1 cm^3 12/30/20 1400   Wound Healing % 95 12/30/20 1400   Wound Bed Granulation (%) 20 % 09/30/20 1400   Wound Bed Slough (%) 40 % 10/21/20 1413   Wound Bed Granulation (%) - Post-Procedure 100 % 09/30/20 1400   Tunneling (cm) 0 cm 12/30/20 1400   Undermining (cm) 0 cm 12/30/20 1400   Wound Odor None 12/30/20 1400   Exposed Structures None 12/30/20 1400       Wound 06/05/20 Full Thickness Wound Ankle LLE distal posteromedial (Active)   Wound Image    12/30/20 1400   Site Assessment Red;Yellow 12/30/20 1400   Periwound Assessment Edema;Denuded;Maceration 12/30/20 1400   Margins Attached edges 12/30/20 1400   Closure Secondary intention 11/11/20 1300   Drainage Amount Large 12/30/20 1400   Drainage Description Serosanguineous 12/30/20 1400   Treatments Cleansed;Topical Lidocaine;Provider debridement 12/30/20 1400   Wound Cleansing Puracyn [place] 12/30/20 1400   Periwound Protectant Skin Protectant Wipes to Periwound;Barrier Paste 12/30/20 1400   Dressing Cleansing/Solutions Not Applicable 12/16/20 1530   Dressing Options Hydrofiber Silver;Absorbent Abdominal Pad;Compression Wrap Four Layer 12/30/20 1400   Dressing Changed Changed 12/16/20 1530   Dressing Status Clean;Dry;Intact 12/16/20 1530   Dressing Change/Treatment Frequency Every 48 hrs, and As Needed 12/16/20 1530   Non-staged Wound Description Full thickness 12/30/20 1400   Wound Length (cm) 2.8 cm 12/30/20 1400   Wound Width (cm) 3.8 cm 12/30/20 1400   Wound Depth (cm) 0.2 cm 12/30/20 1400   Wound Surface Area (cm^2) 10.64 cm^2 12/30/20 1400   Wound  Volume (cm^3) 2.13 cm^3 12/30/20 1400   Post-Procedure Length (cm) 3.8 cm 12/30/20 1400   Post-Procedure Width (cm) 3.9 cm 12/30/20 1400   Post-Procedure Depth (cm) 0.4 cm 12/30/20 1400   Post-Procedure Surface Area (cm^2) 14.82 cm^2 12/30/20 1400   Post-Procedure Volume (cm^3) 5.93 cm^3 12/30/20 1400   Wound Healing % 64 12/30/20 1400   Wound Bed Granulation (%) 10 % 09/30/20 1400   Wound Bed Slough (%) 50 % 10/21/20 1413   Wound Bed Granulation (%) - Post-Procedure 100 % 09/30/20 1400   Tunneling (cm) 0 cm 12/23/20 1045   Undermining (cm) 0 cm 12/30/20 1400   Wound Odor None 12/30/20 1400   Exposed Structures None 12/30/20 1400       Wound 06/05/20 Full Thickness Wound Leg Left LLE posteromedial proximal (Active)   Wound Image    12/30/20 1400   Site Assessment Red;Yellow 12/30/20 1400   Periwound Assessment Edema;Denuded;Maceration 12/30/20 1400   Margins Attached edges 12/30/20 1400   Closure Secondary intention 11/11/20 1300   Drainage Amount Large 12/30/20 1400   Drainage Description Serosanguineous 12/30/20 1400   Treatments Cleansed;Topical Lidocaine;Provider debridement 12/30/20 1400   Wound Cleansing Puracyn [place] 12/30/20 1400   Periwound Protectant Skin Moisturizer;Barrier Paste 12/30/20 1400   Dressing Cleansing/Solutions Not Applicable 12/16/20 1530   Dressing Options Hydrofiber Silver;Absorbent Abdominal Pad;Compression Wrap Four Layer 12/30/20 1400   Dressing Changed Changed 12/16/20 1530   Dressing Status Clean;Dry;Intact 12/16/20 1530   Dressing Change/Treatment Frequency Every 48 hrs, and As Needed 12/16/20 1530   Non-staged Wound Description Full thickness 12/30/20 1400   Wound Length (cm) 7.9 cm 12/30/20 1400   Wound Width (cm) 8.6 cm 12/30/20 1400   Wound Depth (cm) 0.3 cm 12/30/20 1400   Wound Surface Area (cm^2) 67.94 cm^2 12/30/20 1400   Wound Volume (cm^3) 20.38 cm^3 12/30/20 1400   Post-Procedure Length (cm) 8.4 cm 12/30/20 1400   Post-Procedure Width (cm) 8.7 cm 12/30/20 1400    Post-Procedure Depth (cm) 0.3 cm 20   Post-Procedure Surface Area (cm^2) 73.08 cm^2 20 1400   Post-Procedure Volume (cm^3) 21.92 cm^3 20 1400   Wound Healing % -170 20 1400   Wound Bed Granulation (%) 10 % 20 1400   Wound Bed Slough (%) 75 % 10/21/20 1413   Wound Bed Granulation (%) - Post-Procedure 100 % 20 1400   Tunneling (cm) 0 cm 20 1400   Undermining (cm) 0 cm 20 1400   Wound Odor None 20 1400   Exposed Structures None 20                 PROCEDURE: Excisional debridement of left lower extremity wounds   -2% viscous lidocaine applied topically to wound beds for approximately 10 minutes prior to debridement  -Curette used to excise thick layer of slough from wound beds.  Excisional debridement was performed to remove devitalized tissue from all other wounds until healthy, bleeding tissue was visualized.  Total area debrided 103.4 cm² tissue debrided into the subcutaneous layer of all wounds.    -Bleeding controlled with manual pressure.    -Wounds cleansed with normal saline  -Patient tolerated the procedure well, without c/o pain or discomfort.         Pertinent Labs and Diagnostics:    Labs:     A1c:   Lab Results   Component Value Date/Time    HBA1C 5.7 (H) 2020 11:22 AM      Imagin/3/2020-MRI left tibia-fibula with and without contrast  IMPRESSION:     Moderate lateral leg cellulitis and underlying myositis     No abscess or osteomyelitis    VASCULAR STUDIES: BHUPINDER 2019   Right.    Doppler waveform of the common femoral artery is of high amplitude and    triphasic.    Doppler waveforms at the ankle are brisk and triphasic.    Ankle-brachial index is normal.       Left.    Could not perform ankle-brachial index due to large blisters/ulcers.   Normal toe-brachial index: 0.94   Doppler waveform of the common femoral artery is of high amplitude and    triphasic.    Biphasic waveforms seen at the ankle.     WOUND CULTURE:     11/18/2020: Culture collected in clinic  Culture Result Abnormal   Staphylococcus aureus   Light growth     Culture Result Abnormal   Pseudomonas aeruginosa   Light growth     Culture Result Abnormal   Enterococcus faecalis   Light growth              ASSESSMENT AND PLAN:     1. Skin ulcer of left lower leg with fat layer exposed (HCC)  Comment: Patient has history of recurring ulcerations to left lower extremity.  Has undergone ablation procedures by Dr. White.  Discharged from [facility] in March 2020 due to healing, returned approximately 3 weeks later much worse.  Hospitalized from 4/16 until 5/21/2020 underwent multiple surgical debridements.    12/30/2020: Drainage appears to have decreased somewhat, no significant change in area.  Periwound skin around ankle denuded.  -Excisional debridement of wounds in clinic today, medically necessary to promote wound healing.  -Patient to return to clinic weekly for assessment and debridement  -Home health to change dressing 1-2 times per week in between clinic visits      Wound care: Zinc barrier paste around ankle.  Hydrofiber silver x2, absorbent abdominal pad, compression wrap 4 layer     2. Chronic venous stasis  Comments: Hemosiderin staining, brawny edema, scarring- findings consistent with CVI.  Pt established at [facility], underwent ablation procedures in 2019.     -Consider referral back to vascular if wounds failed to progress or deteriorate.    3. Acquired deformity of left ankle and foot  Comments: Charcot deformity of foot and ankle.  Very little ROM of ankle.  Patient was referred to orthopedics previously, however did not make appointment.      -Patient will need to follow-up with orthopedics once wounds are healed.    4.  Wound infection     12/30/2020: Patient states he has completed his antibiotics.  He does not have a follow-up appointment with ID.  -We will contact ID regarding follow-up  - Pt advised to go to ER for any increased redness, swelling,  drainage or odor, or if he develops fever, chills, nausea or vomiting.      5.  Pain associated with wound    12/30/2020:   -2% viscous lidocaine applied topically to wound bed for approximately 5 minutes prior to debridement  -Patient tolerated procedure today with no complaints of discomfort.      Please note that this note may have been created using voice recognition software. I have worked with technical experts from [facility] to optimize the interface.  I have made every reasonable attempt to correct obvious errors, but there may be errors of grammar and possibly content that I did not discover before finalizing the note.    N

## 2021-01-06 ENCOUNTER — OFFICE VISIT (OUTPATIENT)
Dept: WOUND CARE | Facility: MEDICAL CENTER | Age: 66
End: 2021-01-06
Attending: NURSE PRACTITIONER
Payer: MEDICARE

## 2021-01-06 ENCOUNTER — DOCUMENTATION (OUTPATIENT)
Dept: WOUND CARE | Facility: MEDICAL CENTER | Age: 66
End: 2021-01-06

## 2021-01-06 VITALS
DIASTOLIC BLOOD PRESSURE: 105 MMHG | TEMPERATURE: 97.9 F | HEART RATE: 89 BPM | RESPIRATION RATE: 16 BRPM | SYSTOLIC BLOOD PRESSURE: 172 MMHG | OXYGEN SATURATION: 98 %

## 2021-01-06 DIAGNOSIS — L97.922 SKIN ULCER OF LEFT LOWER LEG WITH FAT LAYER EXPOSED (HCC): Primary | ICD-10-CM

## 2021-01-06 DIAGNOSIS — T14.8XXA PAIN ASSOCIATED WITH WOUND: ICD-10-CM

## 2021-01-06 DIAGNOSIS — L08.9 WOUND INFECTION: ICD-10-CM

## 2021-01-06 DIAGNOSIS — T14.8XXA WOUND INFECTION: ICD-10-CM

## 2021-01-06 DIAGNOSIS — M21.962 ACQUIRED DEFORMITY OF LEFT ANKLE AND FOOT: ICD-10-CM

## 2021-01-06 DIAGNOSIS — R52 PAIN ASSOCIATED WITH WOUND: ICD-10-CM

## 2021-01-06 DIAGNOSIS — I87.8 CHRONIC VENOUS STASIS: ICD-10-CM

## 2021-01-06 PROCEDURE — 11045 DBRDMT SUBQ TISS EACH ADDL: CPT

## 2021-01-06 PROCEDURE — 11042 DBRDMT SUBQ TIS 1ST 20SQCM/<: CPT

## 2021-01-06 PROCEDURE — 11045 DBRDMT SUBQ TISS EACH ADDL: CPT | Performed by: NURSE PRACTITIONER

## 2021-01-06 PROCEDURE — 11042 DBRDMT SUBQ TIS 1ST 20SQCM/<: CPT | Performed by: NURSE PRACTITIONER

## 2021-01-06 RX ORDER — SODIUM HYPOCHLORITE 1.25 MG/ML
30 SOLUTION TOPICAL DAILY
Qty: 1000 ML | Refills: 2 | Status: ON HOLD | OUTPATIENT
Start: 2021-01-06 | End: 2021-04-14

## 2021-01-06 ASSESSMENT — ENCOUNTER SYMPTOMS
COUGH: 0
CHILLS: 0
NAUSEA: 0
NERVOUS/ANXIOUS: 0
FEVER: 0
MYALGIAS: 0
DEPRESSION: 1
CLAUDICATION: 0
CONSTIPATION: 0
SHORTNESS OF BREATH: 0
DIARRHEA: 0
VOMITING: 0

## 2021-01-06 NOTE — PROGRESS NOTES
Discussed plan of care for patient during IDT rounds on 01/06/2021.  Suggestions:  Dakins solution to LLE wounds with daily dressing changes  Send patient for venous studies at [facility], last studies from 03/2020 at Dr. White's office  Patient to find Circaid compression wraps that had been ordered for him in the past.

## 2021-01-06 NOTE — PROGRESS NOTES
Provider Encounter- Full Thickness wound    HISTORY OF PRESENT ILLNESS  Wound History:    START OF CARE IN CLINIC: 5/20/2020               REFERRING PROVIDER: KEV Keith                 WOUND ETIOLOGY: venous,  likely mixed etiology              LOCATION: Left lower leg, multiple wounds                                   Right lower leg, multiple wounds-first observed in clinic on initial evaluation, 6/5/2020.  Resolved 9/2/2020                HISTORY: Patient is very well-known to this clinic from previous treatment of wounds to his left lower extremity.  He was discharged from the clinic in March 2020 due to full resolution of his wound.  He returned to the clinic approximately 3 weeks later, on 4/16, with cellulitis, edema, and large open wounds to his left lower leg, and was sent directly to [facility] emergency room.  He was admitted for IV antibiotics and surgical intervention.  On 4/22/2020, he underwent an I&D of his left lower extremity, fasciotomy, and VAC placement.  In the following weeks he underwent surgery 3 more times for irrigation and debridement.  He was discharged home on 5/21, on the VAC, with home health and a referral to [facility].       Pertinent Medical History:  Anxiety, Charcot's joint of left foot, non-diabetic (3/21/2016), Chronic congestive heart failure (HCC) (11/16/2017), Hepatitis C, chronic (HCC), Hypertension, Migraine, Polysubstance abuse (HCC) (3/8/2018), Tobacco use (4/18/2016), Ulcer of left lower extremity with necrosis of muscle (HCC) (3/21/2016), and Venous stasis ulcer ([facility]) (2017).                ETIOLOGY HISTORY:  Vascular Surgeon: Dr. White. Compression Circ-aid. Varicose Veins none visible     TOBACCO USE: Patient denies; patient also denies alcohol and recreational drug use    Patient's problem list, allergies, and current medications reviewed and updated in Epic    Interval History:  6/5/2020: Initial clinic visit with KEV Brantley.  Patient  "returns to clinic after a long hospitalization.  The VAC was held by [facility] due to severe maceration and deterioration of wound beds.  He also presents today with new wounds to his right lower extremity.  He states he does not know how or when these occurred, states, \"they just popped up\".  He is uncertain whether or not he has follow-up appointments with his surgeon.  He does not know what medications he is on.      6/12/2020 : Clinic visit with KEV Brantley.  Goran states he is feeling well today,denies fevers, chills, nausea, vomiting, cough or shortness of breath.  Condition of his periwound skin is significantly improved.  I would like to restart VAC, however he did not bring the VAC with him, and that has already been on for 2-1/2 weeks.  Will likely need to be reordered.  [facility] continues to see him several times per week for dressing changes.  He is tolerating compression without any difficulty.    6/19/2020 : Clinic visit with KEV Brantley.   Goran states he is feeling well today,denies fevers, chills, nausea, vomiting, cough or shortness of breath.  He brought his VAC with him today.  However,  too much slough on wound beds to consider re-applying.  [facility] continues to see him 2 times per week.  I have asked him to bring the VAC with him again next week, and will hopefully place it at that time.  Per patient, dressings are saturated between dressing changes.    6/26/2020 : Clinic visit with KEV Brantley.   Goran states he is feeling well today,denies fevers, chills, nausea, vomiting, cough or shortness of breath.  He brought his VAC with him again today, however we are not able to put it on as he is still having twice drainage.  Sensitivities from culture still pending.  However I did start him on Augmentin today which we are able to obtain from [facility] prior to his leaving the clinic today and had him take one before he left.  [facility] is been seen 2 times per week " in between clinic visits.  We will try and get them to see him over the weekend to do an additional dressing change.  Will adjust antibiotics once sensitivities are known.  Hopefully start VAC next clinic visit.    7/2/2020 : Clinic visit with KEV Brantley.    Goran states he is feeling well today,denies fevers, chills, nausea, vomiting, cough or shortness of breath.  His VAC is here in the clinic, however he does not have another home health appointment until Monday, 4 days from now.  He has been taking Augmentin as prescribed last week.  I informed him that I added Bactrim DS after I got sensitivity results, and that it should be waiting for him at his pharmacy.    7/9/2020: Clinic visit with KEV Luna. Patient states that they are feeling well today.  Patient denies fever, chills, nausea, vomiting, lightheadedness, dizziness, shortness of breath and chest pain.  All wound beds debrided in clinic today patient's wounds continue to progress beefy red granulation tissue to left lower extremity lateral wound tendon is being covered with granulation tissue continue with wound VAC and current plan of care.    7/15/2020 : Clinic visit with KEV Brantley.  Goran states he is feeling well today,denies fevers, chills, nausea, vomiting, cough or shortness of breath.  Home health continues to see him in between clinic visits.  He states he has not been smoking, drinking or using any street drugs.    7/22/2020 : Clinic visit with KEV Brantley.   Goran states he is feeling well today,denies fevers, chills, nausea, vomiting, cough or shortness of breath.  Home health continues to see him in between clinic visits.    7/29/2020: Clinic visit with KEV Luna. Patient states that they are feeling well today.  Patient denies fever, chills, nausea, vomiting, lightheadedness, dizziness, shortness of breath and chest pain.    8/5/2020: Clinic visit with KEV Luna. Patient  states that they are feeling well today.  Patient denies fever, chills, nausea, vomiting, lightheadedness, dizziness, shortness of breath and chest pain.  Patient's wounds are foul-smelling today.  Patient had positive culture we have already ordered the patient Cipro and Augmentin patient reports that he got my message from last night.  Patient reports that he is can  antibiotics tonight in the ED initiate them.  Patient has been referred to infectious disease due to multiple bacterial growth and for their expert opinion.    8/20/2020 : Clinic visit with KEV Brantley.  Goran states he is feeling well, denies fevers, chills, nausea, vomiting, cough or shortness of breath.  He states he has not yet heard from ID.  However, review of referral note indicates that a message was left on his phone earlier today.  Kodi states he will check his message and will call them back.  He completed his antibiotics 2 days ago.    9/2/2020 : Clinic visit with KEV Brantley.   Goran states he is feeling well, denies fevers, chills, nausea, vomiting, cough or shortness of breath.  He was seen by ID yesterday, no new antibiotics ordered, however MRI ordered to assess for OM.  Goran has an appoint with his PCP tomorrow.    9/9/2020 : Clinic visit with KEV Brantley.  Goran states he is feeling well, denies fevers, chills, nausea, vomiting, cough or shortness of breath.  Reviewed MRI results with him in clinic today, no evidence of OM.    9/16/2020 : Clinic visit with KEV Brantley.  Goran presents today looking more disheveled than usual, he wore sunglasses throughout the visit.  His speech is a bit pressured and disorganized.  He denies using any street drugs or drinking alcohol.  His dressings are saturated, and his wounds have deteriorated significantly.  He denies fevers, chills, nausea, vomiting, cough or shortness of breath.  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  Home health continues to see him in between clinic visits for dressing changes.    9/30/2020 : Clinic visit with KEV Brantley.  Goran states he is feeling well today, denies fevers, chills, nausea, vomiting, cough or shortness of breath.  He missed his appointment last week, arrived too late and could not be seen.  He is again wearing his sunglasses throughout today's visit.  He denies using any street drugs or drinking alcohol.  Home health continues to see him in between clinic visits.    10/9/2020: Clinic visit with Abeba ANGULO. Pt denies fevers, chills, nausea, vomiting. Followed by HH. Presents with foul smelling fluorescent green/yellow drainage from wounds and on dressings.     10/21/2020 : Clinic visit with KEV Brantley.  Goran states he is feeling well today, denies fevers, chills, nausea, vomiting, cough or shortness of breath.  He completed his antibiotics a few days ago.  He has not been showering with his wound open, states his house has some, \"plumbing issues\".  Wound has been treated with daily Dakin's moistened gauze.  Patient has been able to change dressing in between clinic visits.    10/28/2020: Clinic visit with KEV Luna. Patient states that they are feeling well today.  Patient denies fever, chills, nausea, vomiting, lightheadedness, dizziness, shortness of breath and chest pain.  We will hold Dakin's application for 1 week.  Left anterior lower extremity wound is progressing however slowly, appears to have red beefy granulation tissue forming.  Medial and posterior wounds continue to wax and wane.      11/11/2020 : Clinic visit with KEV Brantley.  Goran states he is feeling well today, denies fevers, chills, nausea, vomiting, cough or shortness of breath.  Normally home health sees him 2 times per week in between clinic visits.  However, one of his appointments " "was canceled due to illness of home health RN, and he went 5 days in between dressing changes.  He states that during this time the drainage and odor increase significantly.       11/18/2020 : Clinic visit with KEV Brantley.  Goran states that he is feeling well today, denies fevers, chills, nausea, vomiting, cough or shortness of breath.  Denies increasing pain in his left lower extremity states that he noticed more drainage over the past several days.  Home health continues to see him 2 times per week between clinic visits.    11/25/2020 : Clinic visit with JOHNSON Brantley states he is feeling well today, denies fevers, chills, nausea, vomiting, cough or shortness of breath.  He has been taking doxycycline as prescribed last week.  Discussed culture results in clinic today.  Advised him that I would be prescribing amoxicillin in addition to the doxycycline.      12/2/2020 : Clinic visit with JOHNSON Brantley states he is feeling well today, denies fevers, chills, nausea, vomiting, cough or shortness of breath.  He does seem a bit out of sorts today, speech pressured and tangential.  He is wearing his sunglasses throughout today's visit.      12/9/2020 : Clinic visit with JOHNSON Brantley states he is feeling well today, denies fevers, chills, nausea, vomiting, cough or shortness of breath.  His mentation seems much more normal today.  I did question him about his behavior last clinic visit.  He denies using any street drugs, attributed his behavior to his antibiotics..  He did however mention that his wounds always do better with narcotics.  He states he has pain from his wounds and his ankle, \"all the time\".  I informed him that we do not do maintenance narcotics out of this clinic.  Offered referral to pain management, which he stated he would appreciate.  He is also states that he still taking his antibiotics, though he should have completed amoxicillin a week ago, and " doxycycline 11 days ago.    12/16/2020: Clinic visit with KEV Luna. Patient states that they are feeling well today.  Patient denies fever, chills, nausea, vomiting, lightheadedness, dizziness, shortness of breath and chest pain.  Patient seen in clinic today has a significant amount of fluorescent green drainage on wound bandages and over left lower extremity.  Patient culture was positive for Pseudomonas. A 7-day course of Cipro ordered by Jennifer Robb infectious disease specialist.  Patient explained the importance of proper administration of Cipro to help promote wound healing.    12/23/2020: Clinic visit with KEV Luna. Patient states that they are feeling well today.  Patient denies fever, chills, nausea, vomiting, lightheadedness, dizziness, shortness of breath and chest pain.     12/30/2020 : Clinic visit with KEV Brantley.  Goran states he is feeling well, denies fevers, chills, nausea, vomiting, cough or shortness of breath.  He states that home health is asked him to remove his compression wrap and shower before they come to see him.  He stated that I agreed that this would be helpful for his skin, and encouraged him to do so.  At first stated he was having trouble with his shower, but then stated he would be able to comply.  He states he finished his antibiotics a few days ago.    1/6/2021: Clinic visit with KEV Luna. Patient states that they are feeling well today.  Patient denies fever, chills, nausea, vomiting, lightheadedness, dizziness, shortness of breath and chest pain.  Patient's left lower extremity covered in a thick layer of Pseudomonas slough.  Patient has new wound to right lower extremity with Pseudomonas as well.  Initiated Dakin's solution to change daily in clinic.    REVIEW OF SYSTEMS:   Review of Systems   Constitutional: Negative for chills and fever.        States he is eating well, appetite normal   Respiratory: Negative for cough  and shortness of breath.    Cardiovascular: Positive for leg swelling. Negative for chest pain and claudication.        Chronic swelling in legs, for several years   Gastrointestinal: Negative for constipation, diarrhea, nausea and vomiting.   Genitourinary: Negative for dysuria.   Musculoskeletal: Positive for joint pain. Negative for myalgias.        Chronic   Psychiatric/Behavioral: Positive for depression. The patient is not nervous/anxious.        PHYSICAL EXAMINATION:   BP (!) 172/105 Comment: Vital signs taken by [facility] [facility] wound care tech  Pulse 89 Comment: Vital signs taken by [facility] wound care tech  Temp 36.6 °C (97.9 °F) (Temporal) Comment: Vital signs taken by [facility] wound care tech Comment (Src): Vital signs taken by [facility] wound care tech  Resp 16 Comment: Vital signs taken by [facility] wound care tech  SpO2 98% Comment: Vital signs taken by [facility] wound care tech    Physical Exam   Constitutional: He is oriented to person, place, and time and well-developed, well-nourished, and in no distress.   HENT:   Head: Normocephalic and atraumatic.   Eyes: Pupils are equal, round, and reactive to light. Conjunctivae and EOM are normal.   Neck: Neck supple.   Cardiovascular: Intact distal pulses.   Pulmonary/Chest: Effort normal.   Musculoskeletal: Normal range of motion.         General: Deformity and edema present.      Comments: Left charcot foot and ankle  1+pitting edema to bilateral LE   Neurological: He is alert and oriented to person, place, and time.   Skin: Skin is warm. There is erythema.   Large full-thickness wounds to left lower extremities  Refer to wound flowsheet and photos     Psychiatric: Mood, affect and judgment normal.       WOUND ASSESSMENT             Wound 06/05/20 Full Thickness Wound Leg Left LLE anterolateral (Active)   Wound Image    01/06/21 1346   Site Assessment Red;Yellow 01/06/21 1346   Periwound Assessment Denuded;Maceration;Edema;Fragile 01/06/21 1346   Margins  Attached edges 01/06/21 1346   Closure Secondary intention 11/11/20 1300   Drainage Amount Large 01/06/21 1346   Drainage Description Yellow 01/06/21 1346   Treatments Cleansed;Topical Lidocaine;Provider debridement 01/06/21 1346   Wound Cleansing Dakin's Solution 01/06/21 1346   Periwound Protectant Barrier Paste 01/06/21 1346   Dressing Cleansing/Solutions 1/4 Strength Dakin's Solution 01/06/21 1346   Dressing Options Other (Comments);Absorbent Abdominal Pad;Dry Roll Gauze;Hypafix Tape;Tubigrip 01/06/21 1346   Dressing Changed Changed 12/16/20 1530   Dressing Status Clean;Dry;Intact 12/16/20 1530   Dressing Change/Treatment Frequency Every 48 hrs, and As Needed 12/16/20 1530   Non-staged Wound Description Full thickness 01/06/21 1346   Wound Length (cm) 5.9 cm 01/06/21 1346   Wound Width (cm) 2 cm 01/06/21 1346   Wound Depth (cm) 0.1 cm 01/06/21 1346   Wound Surface Area (cm^2) 11.8 cm^2 01/06/21 1346   Wound Volume (cm^3) 1.18 cm^3 01/06/21 1346   Post-Procedure Length (cm) 5.9 cm 01/06/21 1346   Post-Procedure Width (cm) 2.1 cm 01/06/21 1346   Post-Procedure Depth (cm) 0.1 cm 01/06/21 1346   Post-Procedure Surface Area (cm^2) 12.39 cm^2 01/06/21 1346   Post-Procedure Volume (cm^3) 1.24 cm^3 01/06/21 1346   Wound Healing % 98 01/06/21 1346   Wound Bed Granulation (%) 20 % 09/30/20 1400   Wound Bed Slough (%) 15 % 01/06/21 1346   Wound Bed Granulation (%) - Post-Procedure 100 % 09/30/20 1400   Tunneling (cm) 0 cm 01/06/21 1346   Undermining (cm) 0 cm 01/06/21 1346   Wound Odor Foul 01/06/21 1346   Exposed Structures None 01/06/21 1346       Wound 06/05/20 Full Thickness Wound Ankle LLE distal posteromedial (Active)   Wound Image    01/06/21 1346   Site Assessment Red;Yellow 01/06/21 1346   Periwound Assessment Edema;Denuded;Maceration 01/06/21 1346   Margins Attached edges 01/06/21 1346   Closure Secondary intention 11/11/20 1300   Drainage Amount Large 01/06/21 1346   Drainage Description Yellow 01/06/21 1346    Treatments Cleansed;Topical Lidocaine;Provider debridement 01/06/21 1346   Wound Cleansing Dakin's Solution 01/06/21 1346   Periwound Protectant Barrier Paste 01/06/21 1346   Dressing Cleansing/Solutions 1/4 Strength Dakin's Solution 01/06/21 1346   Dressing Options Other (Comments);Absorbent Abdominal Pad;Dry Roll Gauze;Hypafix Tape;Tubigrip 01/06/21 1346   Dressing Changed Changed 12/16/20 1530   Dressing Status Clean;Dry;Intact 12/16/20 1530   Dressing Change/Treatment Frequency Every 48 hrs, and As Needed 12/16/20 1530   Non-staged Wound Description Full thickness 01/06/21 1346   Wound Length (cm) 1 cm 01/06/21 1346   Wound Width (cm) 4.2 cm 01/06/21 1346   Wound Depth (cm) 0.3 cm 01/06/21 1346   Wound Surface Area (cm^2) 4.2 cm^2 01/06/21 1346   Wound Volume (cm^3) 1.26 cm^3 01/06/21 1346   Post-Procedure Length (cm) 2.4 cm 01/06/21 1346   Post-Procedure Width (cm) 3.6 cm 01/06/21 1346   Post-Procedure Depth (cm) 0.3 cm 01/06/21 1346   Post-Procedure Surface Area (cm^2) 8.64 cm^2 01/06/21 1346   Post-Procedure Volume (cm^3) 2.59 cm^3 01/06/21 1346   Wound Healing % 78 01/06/21 1346   Wound Bed Granulation (%) 10 % 09/30/20 1400   Wound Bed Slough (%) 90 % 01/06/21 1346   Wound Bed Granulation (%) - Post-Procedure 100 % 09/30/20 1400   Tunneling (cm) 0 cm 01/06/21 1346   Undermining (cm) 0 cm 01/06/21 1346   Wound Odor Foul 01/06/21 1346   Exposed Structures None 01/06/21 1346       Wound 06/05/20 Full Thickness Wound Leg Left LLE posteromedial proximal (Active)   Wound Image    01/06/21 1346   Site Assessment Red;Yellow 01/06/21 1346   Periwound Assessment Edema;Denuded;Maceration 01/06/21 1346   Margins Attached edges 01/06/21 1346   Closure Secondary intention 11/11/20 1300   Drainage Amount Large 01/06/21 1346   Drainage Description Yellow 01/06/21 1346   Treatments Cleansed;Topical Lidocaine;Provider debridement 01/06/21 1346   Wound Cleansing Dakin's Solution 01/06/21 1346   Periwound Protectant Barrier  Paste 01/06/21 1346   Dressing Cleansing/Solutions 1/4 Strength Dakin's Solution 01/06/21 1346   Dressing Options Other (Comments);Absorbent Abdominal Pad;Dry Roll Gauze;Hypafix Tape;Tubigrip 01/06/21 1346   Dressing Changed Changed 12/16/20 1530   Dressing Status Clean;Dry;Intact 12/16/20 1530   Dressing Change/Treatment Frequency Every 48 hrs, and As Needed 12/16/20 1530   Non-staged Wound Description Full thickness 01/06/21 1346   Wound Length (cm) 8 cm 01/06/21 1346   Wound Width (cm) 7 cm 01/06/21 1346   Wound Depth (cm) 0.2 cm 01/06/21 1346   Wound Surface Area (cm^2) 56 cm^2 01/06/21 1346   Wound Volume (cm^3) 11.2 cm^3 01/06/21 1346   Post-Procedure Length (cm) 8 cm 01/06/21 1346   Post-Procedure Width (cm) 7.5 cm 01/06/21 1346   Post-Procedure Depth (cm) 0.2 cm 01/06/21 1346   Post-Procedure Surface Area (cm^2) 60 cm^2 01/06/21 1346   Post-Procedure Volume (cm^3) 12 cm^3 01/06/21 1346   Wound Healing % -48 01/06/21 1346   Wound Bed Granulation (%) 10 % 09/30/20 1400   Wound Bed Slough (%) 75 % 10/21/20 1413   Wound Bed Granulation (%) - Post-Procedure 100 % 09/30/20 1400   Tunneling (cm) 0 cm 01/06/21 1346   Undermining (cm) 0 cm 01/06/21 1346   Wound Odor None 01/06/21 1346   Exposed Structures None 01/06/21 1346       Wound 01/06/21 RLE lateral (Active)   Wound Image    01/06/21 1346   Site Assessment Yellow 01/06/21 1346   Periwound Assessment Maceration;Edema;Fragile 01/06/21 1346   Margins Attached edges;Undefined edges 01/06/21 1346   Drainage Amount Large 01/06/21 1346   Drainage Description Yellow 01/06/21 1346   Treatments Cleansed;Topical Lidocaine;Provider debridement 01/06/21 1346   Wound Cleansing Dakin's Solution 01/06/21 1346   Periwound Protectant Barrier Paste 01/06/21 1346   Dressing Cleansing/Solutions 1/4 Strength Dakin's Solution 01/06/21 1346   Dressing Options Other (Comments);Absorbent Abdominal Pad;Dry Roll Gauze;Hypafix Tape;Tubigrip 01/06/21 1346   Non-staged Wound Description  Full thickness 21 1346   Wound Length (cm) 11 cm 21 1346   Wound Width (cm) 2.5 cm 21 134   Wound Depth (cm) 0 cm 21 134   Wound Surface Area (cm^2) 27.5 cm^2 21 1346   Wound Volume (cm^3) 0 cm^3 21 1346   Post-Procedure Length (cm) 14.2 cm 21 134   Post-Procedure Width (cm) 2.5 cm 21 134   Post-Procedure Depth (cm) 0.2 cm 21 1346   Post-Procedure Surface Area (cm^2) 35.5 cm^2 21 1346   Post-Procedure Volume (cm^3) 7.1 cm^3 21 134   Wound Bed Slough (%) 100 % 21 134   Tunneling (cm) 0 cm 21 134   Undermining (cm) 0 cm 21   Wound Odor Foul 21   Exposed Structures None 21       PROCEDURE: Excisional debridement of left lower extremity wounds   -2% viscous lidocaine applied topically to wound beds for approximately 10 minutes prior to debridement  -Curette used to excise thick layer of slough from wound beds.  Excisional debridement was performed to remove devitalized tissue from all other wounds until healthy, bleeding tissue was visualized.  Total area debrided 116.53 cm² tissue debrided into the subcutaneous layer of all wounds.    -Bleeding controlled with manual pressure.    -Wounds cleansed with normal saline  -Patient tolerated the procedure well, without c/o pain or discomfort.         Pertinent Labs and Diagnostics:    Labs:     A1c:   Lab Results   Component Value Date/Time    HBA1C 5.7 (H) 2020 11:22 AM      Imagin/3/2020-MRI left tibia-fibula with and without contrast  IMPRESSION:     Moderate lateral leg cellulitis and underlying myositis     No abscess or osteomyelitis    VASCULAR STUDIES: BHUPINDER 2019   Right.    Doppler waveform of the common femoral artery is of high amplitude and    triphasic.    Doppler waveforms at the ankle are brisk and triphasic.    Ankle-brachial index is normal.       Left.    Could not perform ankle-brachial index due to large blisters/ulcers.    Normal toe-brachial index: 0.94   Doppler waveform of the common femoral artery is of high amplitude and    triphasic.    Biphasic waveforms seen at the ankle.     WOUND CULTURE:    11/18/2020: Culture collected in clinic  Culture Result Abnormal   Staphylococcus aureus   Light growth     Culture Result Abnormal   Pseudomonas aeruginosa   Light growth     Culture Result Abnormal   Enterococcus faecalis   Light growth              ASSESSMENT AND PLAN:     1. Skin ulcer of left lower leg with fat layer exposed (HCC)  Comment: Patient has history of recurring ulcerations to left lower extremity.  Has undergone ablation procedures by Dr. White.  Discharged from [facility] in March 2020 due to healing, returned approximately 3 weeks later much worse.  Hospitalized from 4/16 until 5/21/2020 underwent multiple surgical debridements.    01/06/21: Severe amount of drainage to left lower extremity with severe pseudomonal growth and slough.  -Excisional debridement of wounds in clinic today, medically necessary to promote wound healing.  -Patient to return to clinic weekly for assessment and debridement  -Home health to change dressing 1-2 times per week in between clinic visits      Wound care: 0.125% Dakin's moistened gauze, 0 abdominal pad, dry roll gauze, Hypafix tape, Tubigrip D    2. Chronic venous stasis  Comments: Hemosiderin staining, brawny edema, scarring- findings consistent with CVI.  Pt established at [facility], underwent ablation procedures in 2019.     -Consider referral back to vascular if wounds failed to progress or deteriorate.    3. Acquired deformity of left ankle and foot  Comments: Charcot deformity of foot and ankle.  Very little ROM of ankle.  Patient was referred to orthopedics previously, however did not make appointment.      -Patient will need to follow-up with orthopedics once wounds are healed.    4.  Wound infection     01/06/21: Patient states he has completed his antibiotics.    -If Dakin's  solution does not cut down on the Pseudomonas load we will refer patient back to ID.  - Pt advised to go to ER for any increased redness, swelling, drainage or odor, or if he develops fever, chills, nausea or vomiting.      5.  Pain associated with wound    01/06/21:   -2% viscous lidocaine applied topically to wound bed for approximately 5 minutes prior to debridement  -Patient tolerated procedure today with no complaints of discomfort.      Please note that this note may have been created using voice recognition software. I have worked with technical experts from [facility] to optimize the interface.  I have made every reasonable attempt to correct obvious errors, but there may be errors of grammar and possibly content that I did not discover before finalizing the note.    N

## 2021-01-06 NOTE — PATIENT INSTRUCTIONS
-Keep dressings clean and dry. Change dressings once between wound clinic visits, and if the dressings become saturated, soiled, or fall off.    -Avoid prolonged standing or sitting without elevating your legs.    -Remove your compression garments if you have severe pain, severe swelling, numbness, color change, or temperature change in your toes. If you need to remove your compression garments, do so by unrolling them. Do not cut the compression garments off, this is to prevent cutting yourself on accident.    -Should you experience any significant changes in your wound(s), such as infection (redness, swelling, localized heat, increased pain, fever > 101 F, chills) or have any questions regarding your home care instructions, please contact the wound center at [phone]. If after hours, contact your primary care physician or go to the hospital emergency room.

## 2021-01-06 NOTE — PROGRESS NOTES
Wound care orders faxed to [facility] #[phone].    Dakins moistened gauze used as dressing this visit. Patient educated on how to do dressing changes and to change dressings daily in between home health visits. Patient able to teach back how to change dressings to BLE. Patient given directions to [facility] pharmacy and directed to go  Dakins solution that APRN ordered this visit.

## 2021-01-13 ENCOUNTER — OFFICE VISIT (OUTPATIENT)
Dept: WOUND CARE | Facility: MEDICAL CENTER | Age: 66
End: 2021-01-13
Attending: NURSE PRACTITIONER
Payer: MEDICARE

## 2021-01-13 ENCOUNTER — HOSPITAL ENCOUNTER (OUTPATIENT)
Dept: CARDIOLOGY | Facility: MEDICAL CENTER | Age: 66
End: 2021-01-13
Attending: NURSE PRACTITIONER
Payer: MEDICARE

## 2021-01-13 VITALS
HEART RATE: 104 BPM | SYSTOLIC BLOOD PRESSURE: 128 MMHG | DIASTOLIC BLOOD PRESSURE: 78 MMHG | TEMPERATURE: 97.4 F | OXYGEN SATURATION: 94 % | RESPIRATION RATE: 20 BRPM

## 2021-01-13 DIAGNOSIS — T14.8XXA WOUND INFECTION: ICD-10-CM

## 2021-01-13 DIAGNOSIS — I49.8 OTHER CARDIAC ARRHYTHMIA: ICD-10-CM

## 2021-01-13 DIAGNOSIS — L97.929 ULCER OF LEFT LOWER EXTREMITY, UNSPECIFIED ULCER STAGE (HCC): ICD-10-CM

## 2021-01-13 DIAGNOSIS — L08.9 WOUND INFECTION: ICD-10-CM

## 2021-01-13 DIAGNOSIS — I87.8 CHRONIC VENOUS STASIS: ICD-10-CM

## 2021-01-13 DIAGNOSIS — L97.922 SKIN ULCER OF LEFT LOWER LEG WITH FAT LAYER EXPOSED (HCC): Primary | ICD-10-CM

## 2021-01-13 DIAGNOSIS — T14.8XXA PAIN ASSOCIATED WITH WOUND: ICD-10-CM

## 2021-01-13 DIAGNOSIS — R52 PAIN ASSOCIATED WITH WOUND: ICD-10-CM

## 2021-01-13 PROCEDURE — 99213 OFFICE O/P EST LOW 20 MIN: CPT | Mod: 25 | Performed by: NURSE PRACTITIONER

## 2021-01-13 PROCEDURE — 99213 OFFICE O/P EST LOW 20 MIN: CPT | Mod: 25

## 2021-01-13 PROCEDURE — 11045 DBRDMT SUBQ TISS EACH ADDL: CPT

## 2021-01-13 PROCEDURE — 11045 DBRDMT SUBQ TISS EACH ADDL: CPT | Performed by: NURSE PRACTITIONER

## 2021-01-13 PROCEDURE — 11042 DBRDMT SUBQ TIS 1ST 20SQCM/<: CPT | Performed by: NURSE PRACTITIONER

## 2021-01-13 PROCEDURE — 11042 DBRDMT SUBQ TIS 1ST 20SQCM/<: CPT

## 2021-01-13 PROCEDURE — 93005 ELECTROCARDIOGRAM TRACING: CPT | Performed by: NURSE PRACTITIONER

## 2021-01-13 RX ORDER — CIPROFLOXACIN 500 MG/1
500 TABLET, FILM COATED ORAL 2 TIMES DAILY
Qty: 20 TAB | Refills: 0 | Status: SHIPPED | OUTPATIENT
Start: 2021-01-13 | End: 2021-01-13

## 2021-01-13 RX ORDER — CIPROFLOXACIN 500 MG/1
500 TABLET, FILM COATED ORAL 2 TIMES DAILY
Qty: 20 TAB | Refills: 0 | Status: SHIPPED | OUTPATIENT
Start: 2021-01-13 | End: 2021-01-23

## 2021-01-13 ASSESSMENT — ENCOUNTER SYMPTOMS
SHORTNESS OF BREATH: 0
VOMITING: 0
CHILLS: 0
NAUSEA: 0
MYALGIAS: 0
FEVER: 0
NERVOUS/ANXIOUS: 0
CONSTIPATION: 0
COUGH: 0
DIARRHEA: 0
CLAUDICATION: 0
DEPRESSION: 1

## 2021-01-13 NOTE — PROGRESS NOTES
Provider Encounter- Full Thickness wound    HISTORY OF PRESENT ILLNESS  Wound History:    START OF CARE IN CLINIC: 5/20/2020               REFERRING PROVIDER: KEV Keith                 WOUND ETIOLOGY: venous,  likely mixed etiology              LOCATION: Left lower leg, multiple wounds                                   Right lower leg, multiple wounds-first observed in clinic on initial evaluation, 6/5/2020.  Resolved 9/2/2020                HISTORY: Patient is very well-known to this clinic from previous treatment of wounds to his left lower extremity.  He was discharged from the clinic in March 2020 due to full resolution of his wound.  He returned to the clinic approximately 3 weeks later, on 4/16, with cellulitis, edema, and large open wounds to his left lower leg, and was sent directly to [facility] emergency room.  He was admitted for IV antibiotics and surgical intervention.  On 4/22/2020, he underwent an I&D of his left lower extremity, fasciotomy, and VAC placement.  In the following weeks he underwent surgery 3 more times for irrigation and debridement.  He was discharged home on 5/21, on the VAC, with home health and a referral to [facility].       Pertinent Medical History:  Anxiety, Charcot's joint of left foot, non-diabetic (3/21/2016), Chronic congestive heart failure (HCC) (11/16/2017), Hepatitis C, chronic (HCC), Hypertension, Migraine, Polysubstance abuse (HCC) (3/8/2018), Tobacco use (4/18/2016), Ulcer of left lower extremity with necrosis of muscle (HCC) (3/21/2016), and Venous stasis ulcer ([facility]) (2017).                ETIOLOGY HISTORY:  Vascular Surgeon: Dr. White. Compression Circ-aid. Varicose Veins none visible     TOBACCO USE: Patient denies; patient also denies alcohol and recreational drug use    Patient's problem list, allergies, and current medications reviewed and updated in Epic    Interval History:  6/5/2020: Initial clinic visit with KEV Brantley.  Patient  "returns to clinic after a long hospitalization.  The VAC was held by [facility] due to severe maceration and deterioration of wound beds.  He also presents today with new wounds to his right lower extremity.  He states he does not know how or when these occurred, states, \"they just popped up\".  He is uncertain whether or not he has follow-up appointments with his surgeon.  He does not know what medications he is on.      6/12/2020 : Clinic visit with KEV Brantley.  Goran states he is feeling well today,denies fevers, chills, nausea, vomiting, cough or shortness of breath.  Condition of his periwound skin is significantly improved.  I would like to restart VAC, however he did not bring the VAC with him, and that has already been on for 2-1/2 weeks.  Will likely need to be reordered.  [facility] continues to see him several times per week for dressing changes.  He is tolerating compression without any difficulty.    6/19/2020 : Clinic visit with KEV Brantley.   Goran states he is feeling well today,denies fevers, chills, nausea, vomiting, cough or shortness of breath.  He brought his VAC with him today.  However,  too much slough on wound beds to consider re-applying.  [facility] continues to see him 2 times per week.  I have asked him to bring the VAC with him again next week, and will hopefully place it at that time.  Per patient, dressings are saturated between dressing changes.    6/26/2020 : Clinic visit with KEV Brantley.   Goran states he is feeling well today,denies fevers, chills, nausea, vomiting, cough or shortness of breath.  He brought his VAC with him again today, however we are not able to put it on as he is still having twice drainage.  Sensitivities from culture still pending.  However I did start him on Augmentin today which we are able to obtain from [facility] prior to his leaving the clinic today and had him take one before he left.  Critical access hospital is been seen 2 times per week " in between clinic visits.  We will try and get them to see him over the weekend to do an additional dressing change.  Will adjust antibiotics once sensitivities are known.  Hopefully start VAC next clinic visit.    7/2/2020 : Clinic visit with KEV Brantley.    Goran states he is feeling well today,denies fevers, chills, nausea, vomiting, cough or shortness of breath.  His VAC is here in the clinic, however he does not have another home health appointment until Monday, 4 days from now.  He has been taking Augmentin as prescribed last week.  I informed him that I added Bactrim DS after I got sensitivity results, and that it should be waiting for him at his pharmacy.    7/9/2020: Clinic visit with KEV Luna. Patient states that they are feeling well today.  Patient denies fever, chills, nausea, vomiting, lightheadedness, dizziness, shortness of breath and chest pain.  All wound beds debrided in clinic today patient's wounds continue to progress beefy red granulation tissue to left lower extremity lateral wound tendon is being covered with granulation tissue continue with wound VAC and current plan of care.    7/15/2020 : Clinic visit with KEV Brantley.  Goran states he is feeling well today,denies fevers, chills, nausea, vomiting, cough or shortness of breath.  Home health continues to see him in between clinic visits.  He states he has not been smoking, drinking or using any street drugs.    7/22/2020 : Clinic visit with KEV Brantley.   Goran states he is feeling well today,denies fevers, chills, nausea, vomiting, cough or shortness of breath.  Home health continues to see him in between clinic visits.    7/29/2020: Clinic visit with KEV Luna. Patient states that they are feeling well today.  Patient denies fever, chills, nausea, vomiting, lightheadedness, dizziness, shortness of breath and chest pain.    8/5/2020: Clinic visit with KEV Luna. Patient  states that they are feeling well today.  Patient denies fever, chills, nausea, vomiting, lightheadedness, dizziness, shortness of breath and chest pain.  Patient's wounds are foul-smelling today.  Patient had positive culture we have already ordered the patient Cipro and Augmentin patient reports that he got my message from last night.  Patient reports that he is can  antibiotics tonight in the ED initiate them.  Patient has been referred to infectious disease due to multiple bacterial growth and for their expert opinion.    8/20/2020 : Clinic visit with KEV Brantley.  Goran states he is feeling well, denies fevers, chills, nausea, vomiting, cough or shortness of breath.  He states he has not yet heard from ID.  However, review of referral note indicates that a message was left on his phone earlier today.  Kodi states he will check his message and will call them back.  He completed his antibiotics 2 days ago.    9/2/2020 : Clinic visit with KEV Brantley.   Goran states he is feeling well, denies fevers, chills, nausea, vomiting, cough or shortness of breath.  He was seen by ID yesterday, no new antibiotics ordered, however MRI ordered to assess for OM.  Goran has an appoint with his PCP tomorrow.    9/9/2020 : Clinic visit with KEV Brantley.  Goran states he is feeling well, denies fevers, chills, nausea, vomiting, cough or shortness of breath.  Reviewed MRI results with him in clinic today, no evidence of OM.    9/16/2020 : Clinic visit with KEV Brantley.  Goran presents today looking more disheveled than usual, he wore sunglasses throughout the visit.  His speech is a bit pressured and disorganized.  He denies using any street drugs or drinking alcohol.  His dressings are saturated, and his wounds have deteriorated significantly.  He denies fevers, chills, nausea, vomiting, cough or shortness of breath.  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  Home health continues to see him in between clinic visits for dressing changes.    9/30/2020 : Clinic visit with KEV Brantley.  Goran states he is feeling well today, denies fevers, chills, nausea, vomiting, cough or shortness of breath.  He missed his appointment last week, arrived too late and could not be seen.  He is again wearing his sunglasses throughout today's visit.  He denies using any street drugs or drinking alcohol.  Home health continues to see him in between clinic visits.    10/9/2020: Clinic visit with Abeba ANGULO. Pt denies fevers, chills, nausea, vomiting. Followed by HH. Presents with foul smelling fluorescent green/yellow drainage from wounds and on dressings.     10/21/2020 : Clinic visit with KEV Brantley.  Goran states he is feeling well today, denies fevers, chills, nausea, vomiting, cough or shortness of breath.  He completed his antibiotics a few days ago.  He has not been showering with his wound open, states his house has some, \"plumbing issues\".  Wound has been treated with daily Dakin's moistened gauze.  Patient has been able to change dressing in between clinic visits.    10/28/2020: Clinic visit with KEV Luna. Patient states that they are feeling well today.  Patient denies fever, chills, nausea, vomiting, lightheadedness, dizziness, shortness of breath and chest pain.  We will hold Dakin's application for 1 week.  Left anterior lower extremity wound is progressing however slowly, appears to have red beefy granulation tissue forming.  Medial and posterior wounds continue to wax and wane.      11/11/2020 : Clinic visit with KEV Brantley.  Goran states he is feeling well today, denies fevers, chills, nausea, vomiting, cough or shortness of breath.  Normally home health sees him 2 times per week in between clinic visits.  However, one of his appointments " "was canceled due to illness of home health RN, and he went 5 days in between dressing changes.  He states that during this time the drainage and odor increase significantly.       11/18/2020 : Clinic visit with KEV Brantley.  Goran states that he is feeling well today, denies fevers, chills, nausea, vomiting, cough or shortness of breath.  Denies increasing pain in his left lower extremity states that he noticed more drainage over the past several days.  Home health continues to see him 2 times per week between clinic visits.    11/25/2020 : Clinic visit with JOHNSON Brantley states he is feeling well today, denies fevers, chills, nausea, vomiting, cough or shortness of breath.  He has been taking doxycycline as prescribed last week.  Discussed culture results in clinic today.  Advised him that I would be prescribing amoxicillin in addition to the doxycycline.      12/2/2020 : Clinic visit with JOHNSON Brantley states he is feeling well today, denies fevers, chills, nausea, vomiting, cough or shortness of breath.  He does seem a bit out of sorts today, speech pressured and tangential.  He is wearing his sunglasses throughout today's visit.      12/9/2020 : Clinic visit with JOHNSON Brantley states he is feeling well today, denies fevers, chills, nausea, vomiting, cough or shortness of breath.  His mentation seems much more normal today.  I did question him about his behavior last clinic visit.  He denies using any street drugs, attributed his behavior to his antibiotics..  He did however mention that his wounds always do better with narcotics.  He states he has pain from his wounds and his ankle, \"all the time\".  I informed him that we do not do maintenance narcotics out of this clinic.  Offered referral to pain management, which he stated he would appreciate.  He is also states that he still taking his antibiotics, though he should have completed amoxicillin a week ago, and " doxycycline 11 days ago.    12/16/2020: Clinic visit with KEV Luna. Patient states that they are feeling well today.  Patient denies fever, chills, nausea, vomiting, lightheadedness, dizziness, shortness of breath and chest pain.  Patient seen in clinic today has a significant amount of fluorescent green drainage on wound bandages and over left lower extremity.  Patient culture was positive for Pseudomonas. A 7-day course of Cipro ordered by Jennifer Robb infectious disease specialist.  Patient explained the importance of proper administration of Cipro to help promote wound healing.    12/23/2020: Clinic visit with KEV Luna. Patient states that they are feeling well today.  Patient denies fever, chills, nausea, vomiting, lightheadedness, dizziness, shortness of breath and chest pain.     12/30/2020 : Clinic visit with KEV Brantley.  Goran states he is feeling well, denies fevers, chills, nausea, vomiting, cough or shortness of breath.  He states that home health is asked him to remove his compression wrap and shower before they come to see him.  He stated that I agreed that this would be helpful for his skin, and encouraged him to do so.  At first stated he was having trouble with his shower, but then stated he would be able to comply.  He states he finished his antibiotics a few days ago.    1/6/2021: Clinic visit with KEV Luna. Patient states that they are feeling well today.  Patient denies fever, chills, nausea, vomiting, lightheadedness, dizziness, shortness of breath and chest pain.  Patient's left lower extremity covered in a thick layer of Pseudomonas slough.  Patient has new wound to right lower extremity with Pseudomonas as well.  Initiated Dakin's solution to change daily in clinic.    1/13/2021: Clinic visit with KEV Luna. Patient states that they are feeling well today.  Patient denies fever, chills, nausea, vomiting, lightheadedness,  dizziness, shortness of breath and chest pain.  Patient reports that he did not put Dakin's solution on bandages at last dressing change.  Patient's Pseudomonas is worse.  Therefore, we will place the patient on Cipro and ordered an EKG to evaluate the patient's QTC interval.  Patient educated again to continue Dakin's solution as well as complete the course of antibiotics.        REVIEW OF SYSTEMS:   Review of Systems   Constitutional: Negative for chills and fever.        States he is eating well, appetite normal   Respiratory: Negative for cough and shortness of breath.    Cardiovascular: Positive for leg swelling. Negative for chest pain and claudication.        Chronic swelling in legs, for several years   Gastrointestinal: Negative for constipation, diarrhea, nausea and vomiting.   Genitourinary: Negative for dysuria.   Musculoskeletal: Positive for joint pain. Negative for myalgias.        Chronic   Psychiatric/Behavioral: Positive for depression. The patient is not nervous/anxious.        PHYSICAL EXAMINATION:   /78 Comment: Vital signs taken by Rehab Therapy Tech Rosey Casarez.  Pulse (!) 104 Comment: Vital signs taken by Rehab Therapy Tech Rosey Casarez.  Temp 36.3 °C (97.4 °F) (Temporal) Comment: Vital signs taken by Rehab Therapy Tech Rosey Casarez. Comment (Src): Vital signs taken by Rehab Therapy Tech Rosey Casarez.  Resp 20 Comment: Vital signs taken by Rehab Therapy Tech Rosey Casarez.  SpO2 94% Comment: Vital signs taken by Rehab Therapy Tech Rosey Casarez.    Physical Exam   Constitutional: He is oriented to person, place, and time and well-developed, well-nourished, and in no distress.   HENT:   Head: Normocephalic and atraumatic.   Eyes: Pupils are equal, round, and reactive to light. Conjunctivae and EOM are normal.   Neck: Neck supple.   Cardiovascular: Intact distal pulses.   Pulmonary/Chest: Effort normal.   Musculoskeletal: Normal range of motion.         General: Deformity  and edema present.      Comments: Left charcot foot and ankle  1+pitting edema to bilateral LE   Neurological: He is alert and oriented to person, place, and time.   Skin: Skin is warm. There is erythema.   Large full-thickness wounds to left lower extremities  Refer to wound flowsheet and photos     Psychiatric: Mood, affect and judgment normal.       WOUND ASSESSMENT               Wound 06/05/20 Full Thickness Wound Leg Left --Left Lateral LE (Active)   Wound Image    01/13/21 1345   Site Assessment Pink;Yellow;Other (Comment) 01/13/21 1345   Periwound Assessment Edema;Denuded;Maceration 01/13/21 1345   Margins Attached edges 01/13/21 1345   Closure Secondary intention 11/11/20 1300   Drainage Amount Copious 01/13/21 1345   Drainage Description Green 01/13/21 1345   Treatments Cleansed;Topical Lidocaine;Provider debridement 01/13/21 1345   Wound Cleansing Dakin's Solution 01/13/21 1345   Periwound Protectant Not Applicable 01/13/21 1345   Dressing Cleansing/Solutions 1/4 Strength Dakin's Solution 01/13/21 1345   Dressing Options Other (Comments);Dry Roll Gauze;Absorbent Abdominal Pad;Hypafix Tape;Tubigrip 01/13/21 1345   Dressing Changed New 01/13/21 1345   Dressing Status Clean;Dry;Intact 12/16/20 1530   Dressing Change/Treatment Frequency Every 48 hrs, and As Needed 12/16/20 1530   Non-staged Wound Description Full thickness 01/13/21 1345   Wound Length (cm) 19.9 cm 01/13/21 1345   Wound Width (cm) 7.5 cm 01/13/21 1345   Wound Depth (cm) 0.1 cm 01/13/21 1345   Wound Surface Area (cm^2) 149.25 cm^2 01/13/21 1345   Wound Volume (cm^3) 14.92 cm^3 01/13/21 1345   Post-Procedure Length (cm) 20.5 cm 01/13/21 1345   Post-Procedure Width (cm) 7.5 cm 01/13/21 1345   Post-Procedure Depth (cm) 0.1 cm 01/13/21 1345   Post-Procedure Surface Area (cm^2) 153.75 cm^2 01/13/21 1345   Post-Procedure Volume (cm^3) 15.38 cm^3 01/13/21 1345   Wound Healing % 73 01/13/21 1345   Wound Bed Granulation (%) 5 % 01/13/21 1345   Wound Bed  Epithelium (%) 0 % 01/13/21 1345   Wound Bed Slough (%) 95 % 01/13/21 1345   Wound Bed Eschar (%) 0 % 01/13/21 1345   Wound Bed Granulation (%) - Post-Procedure 100 % 09/30/20 1400   Tunneling (cm) 0 cm 01/13/21 1345   Undermining (cm) 0 cm 01/13/21 1345   Wound Odor Foul 01/13/21 1345   Exposed Structures None 01/13/21 1345       Wound 06/05/20 Full Thickness Wound Ankle LLE distal posteromedial (Active)   Wound Image    01/13/21 1345   Site Assessment Yellow;Other (Comment) 01/13/21 1345   Periwound Assessment Edema;Denuded;Maceration 01/13/21 1345   Margins Attached edges 01/13/21 1345   Closure Secondary intention 11/11/20 1300   Drainage Amount Copious 01/13/21 1345   Drainage Description Green 01/13/21 1345   Treatments Cleansed;Topical Lidocaine;Provider debridement 01/13/21 1345   Wound Cleansing Dakin's Solution 01/13/21 1345   Periwound Protectant Not Applicable 01/13/21 1345   Dressing Cleansing/Solutions 1/4 Strength Dakin's Solution 01/13/21 1345   Dressing Options Other (Comments);Absorbent Abdominal Pad;Dry Roll Gauze;Hypafix Tape;Tubigrip 01/13/21 1345   Dressing Changed New 01/13/21 1345   Dressing Status Clean;Dry;Intact 12/16/20 1530   Dressing Change/Treatment Frequency Every 48 hrs, and As Needed 12/16/20 1530   Non-staged Wound Description Full thickness 01/13/21 1345   Wound Length (cm) 3 cm 01/13/21 1345   Wound Width (cm) 5.2 cm 01/13/21 1345   Wound Depth (cm) 0.4 cm 01/13/21 1345   Wound Surface Area (cm^2) 15.6 cm^2 01/13/21 1345   Wound Volume (cm^3) 6.24 cm^3 01/13/21 1345   Post-Procedure Length (cm) 4 cm 01/13/21 1345   Post-Procedure Width (cm) 6 cm 01/13/21 1345   Post-Procedure Depth (cm) 0.4 cm 01/13/21 1345   Post-Procedure Surface Area (cm^2) 24 cm^2 01/13/21 1345   Post-Procedure Volume (cm^3) 9.6 cm^3 01/13/21 1345   Wound Healing % -7 01/13/21 1345   Wound Bed Granulation (%) 0 % 01/13/21 1345   Wound Bed Epithelium (%) 0 % 01/13/21 1345   Wound Bed Slough (%) 100 % 01/13/21  1345   Wound Bed Eschar (%) 0 % 01/13/21 1345   Wound Bed Granulation (%) - Post-Procedure 100 % 09/30/20 1400   Tunneling (cm) 0 cm 01/13/21 1345   Undermining (cm) 0 cm 01/13/21 1345   Wound Odor Foul 01/13/21 1345   Exposed Structures None 01/13/21 1345       Wound 06/05/20 Full Thickness Wound Leg Left LLE posteromedial proximal (Active)   Wound Image    01/13/21 1345   Site Assessment Yellow;Other (Comment) 01/13/21 1345   Periwound Assessment Edema;Denuded;Maceration 01/13/21 1345   Margins Attached edges 01/13/21 1345   Closure Secondary intention 11/11/20 1300   Drainage Amount Copious 01/13/21 1345   Drainage Description Green 01/13/21 1345   Treatments Cleansed;Topical Lidocaine;Provider debridement 01/13/21 1345   Wound Cleansing Dakin's Solution 01/13/21 1345   Periwound Protectant Not Applicable 01/13/21 1345   Dressing Cleansing/Solutions 1/4 Strength Dakin's Solution 01/13/21 1345   Dressing Options Other (Comments);Absorbent Abdominal Pad;Dry Roll Gauze;Hypafix Tape;Tubigrip 01/13/21 1345   Dressing Changed New 01/13/21 1345   Dressing Status Clean;Dry;Intact 12/16/20 1530   Dressing Change/Treatment Frequency Every 48 hrs, and As Needed 12/16/20 1530   Non-staged Wound Description Full thickness 01/13/21 1345   Wound Length (cm) 8.7 cm 01/13/21 1345   Wound Width (cm) 8.5 cm 01/13/21 1345   Wound Depth (cm) 0.4 cm 01/13/21 1345   Wound Surface Area (cm^2) 73.95 cm^2 01/13/21 1345   Wound Volume (cm^3) 29.58 cm^3 01/13/21 1345   Post-Procedure Length (cm) 8.9 cm 01/13/21 1345   Post-Procedure Width (cm) 8.5 cm 01/13/21 1345   Post-Procedure Depth (cm) 0.4 cm 01/13/21 1345   Post-Procedure Surface Area (cm^2) 75.65 cm^2 01/13/21 1345   Post-Procedure Volume (cm^3) 30.26 cm^3 01/13/21 1345   Wound Healing % -291 01/13/21 1345   Wound Bed Granulation (%) 10 % 09/30/20 1400   Wound Bed Slough (%) 75 % 10/21/20 1413   Wound Bed Granulation (%) - Post-Procedure 100 % 09/30/20 1400   Tunneling (cm) 0 cm  01/13/21 1345   Undermining (cm) 0 cm 01/13/21 1345   Wound Odor Foul 01/13/21 1345   Exposed Structures None 01/13/21 1345       Wound 01/13/21 Full Thickness Wound Ankle Anterior Left --Left Anterior Ankle (Active)   Wound Image   01/13/21 1345   Site Assessment Red;Yellow;Other (Comment) 01/13/21 1345   Periwound Assessment Edema;Denuded 01/13/21 1345   Margins Attached edges 01/13/21 1345   Drainage Amount Copious 01/13/21 1345   Drainage Description Green 01/13/21 1345   Treatments Cleansed;Topical Lidocaine;Provider debridement 01/13/21 1345   Wound Cleansing Dakin's Solution 01/13/21 1345   Periwound Protectant Not Applicable 01/13/21 1345   Dressing Cleansing/Solutions 1/4 Strength Dakin's Solution 01/13/21 1345   Dressing Options Other (Comments);Absorbent Abdominal Pad;Dry Roll Gauze;Hypafix Tape;Tubigrip 01/13/21 1345   Dressing Changed New 01/13/21 1345   Non-staged Wound Description Full thickness 01/13/21 1345   Post-Procedure Length (cm) 4.7 cm 01/13/21 1345   Post-Procedure Width (cm) 3.5 cm 01/13/21 1345   Post-Procedure Depth (cm) 0.1 cm 01/13/21 1345   Post-Procedure Surface Area (cm^2) 16.45 cm^2 01/13/21 1345   Post-Procedure Volume (cm^3) 1.64 cm^3 01/13/21 1345   Tunneling (cm) 0 cm 01/13/21 1345   Undermining (cm) 0 cm 01/13/21 1345   Wound Odor Foul 01/13/21 1345   Exposed Structures None 01/13/21 1345            PROCEDURE: Excisional debridement of left lower extremity wounds   -2% viscous lidocaine applied topically to wound beds for approximately 10 minutes prior to debridement  -Curette used to excise thick layer of slough from wound beds.  Excisional debridement was performed to remove devitalized tissue from all other wounds until healthy, bleeding tissue was visualized.  Total area debrided 269.85  cm² tissue debrided into the subcutaneous layer of all wounds.    -Bleeding controlled with manual pressure.    -Wounds cleansed with normal saline  -Patient tolerated the procedure well,  without c/o pain or discomfort.         Pertinent Labs and Diagnostics:    Labs:     A1c:   Lab Results   Component Value Date/Time    HBA1C 5.7 (H) 2020 11:22 AM      Imagin/3/2020-MRI left tibia-fibula with and without contrast  IMPRESSION:     Moderate lateral leg cellulitis and underlying myositis     No abscess or osteomyelitis    VASCULAR STUDIES: BHUPINDER 2019   Right.    Doppler waveform of the common femoral artery is of high amplitude and    triphasic.    Doppler waveforms at the ankle are brisk and triphasic.    Ankle-brachial index is normal.       Left.    Could not perform ankle-brachial index due to large blisters/ulcers.   Normal toe-brachial index: 0.94   Doppler waveform of the common femoral artery is of high amplitude and    triphasic.    Biphasic waveforms seen at the ankle.     WOUND CULTURE:    2020: Culture collected in clinic  Culture Result Abnormal   Staphylococcus aureus   Light growth     Culture Result Abnormal   Pseudomonas aeruginosa   Light growth     Culture Result Abnormal   Enterococcus faecalis   Light growth              ASSESSMENT AND PLAN:     1. Skin ulcer of left lower leg with fat layer exposed (HCC)  Comment: Patient has history of recurring ulcerations to left lower extremity.  Has undergone ablation procedures by Dr. White.  Discharged from [facility] in 2020 due to healing, returned approximately 3 weeks later much worse.  Hospitalized from  until 2020 underwent multiple surgical debridements.    21: Severe amount of drainage to left lower extremity with severe pseudomonal growth and slough.  -Excisional debridement of wounds in clinic today, medically necessary to promote wound healing.  -Patient to return to clinic weekly for assessment and debridement  -Home health to change dressing 1-2 times per week in between clinic visits  -Patient is to maintain Dakin's dressing changes.  -Oral Cipro 500 mg twice daily x10 days ordered in  clinic today.  EKG ordered to determine QTC.    Wound care: 0.125% Dakin's moistened gauze, absorbent abdominal pad, dry roll gauze, Hypafix tape, Tubigrip E    2. Chronic venous stasis  Comments: Hemosiderin staining, brawny edema, scarring- findings consistent with CVI.  Pt established at [facility], underwent ablation procedures in 2019.     -Consider referral back to vascular if wounds failed to progress or deteriorate.    3. Acquired deformity of left ankle and foot  Comments: Charcot deformity of foot and ankle.  Very little ROM of ankle.  Patient was referred to orthopedics previously, however did not make appointment.      -Patient will need to follow-up with orthopedics once wounds are healed.    4.  Wound infection     01/13/21:   -Patient to continue Dakin's solution for 1 more week to cut down on Pseudomonas lobe.  -Ciprofloxacin 500 mg twice daily for Q 12 for 10 days ordered.  EKG ordered to establish QTc interval.  - Pt advised to go to ER for any increased redness, swelling, drainage or odor, or if he develops fever, chills, nausea or vomiting.      5.  Pain associated with wound    01/13/21:   -2% viscous lidocaine applied topically to wound bed for approximately 5 minutes prior to debridement  -Patient tolerated procedure today with no complaints of discomfort.    > 20 min spent face to face with patient, >50% of time spent counseling, coordinating care, reviewing records, discussing POC, educating patient      Please note that this note may have been created using voice recognition software. I have worked with technical experts from [facility] to optimize the interface.  I have made every reasonable attempt to correct obvious errors, but there may be errors of grammar and possibly content that I did not discover before finalizing the note.    N

## 2021-01-13 NOTE — PATIENT INSTRUCTIONS
-Keep dressings clean and dry. Change dressings if they become over saturated, soiled or fall off.     -Avoid prolonged standing or sitting without elevating your legs.    -Remove your compression garments if you have severe pain, severe swelling, numbness, color change, or temperature change in your toes. If you need to remove your compression garments, do so by unrolling them. Do not cut the compression garments off, this is to prevent cutting yourself on accident.    -Should you experience any significant changes in your wound(s), such as infection (redness, swelling, localized heat, increased pain, fever > 101 F, chills) or have any questions regarding your home care instructions, please contact the wound center at [phone]. If after hours, contact your primary care physician or go to the hospital emergency room.

## 2021-01-14 ENCOUNTER — TELEPHONE (OUTPATIENT)
Dept: WOUND CARE | Facility: MEDICAL CENTER | Age: 66
End: 2021-01-14

## 2021-01-14 NOTE — TELEPHONE ENCOUNTER
[facility], Tena RN called to update that they do encourage patient to bath. Currently patient is having a mold problem in mobile home shower. Home Health is assisting in getting maintenance to care for patient home.  Patient has nurse change dressings and perform wound care on picnic table outside. He does provide a warm bucket of water for nurse to use to clean wounds.

## 2021-01-15 ENCOUNTER — TELEPHONE (OUTPATIENT)
Dept: WOUND CARE | Facility: MEDICAL CENTER | Age: 66
End: 2021-01-15

## 2021-01-15 LAB — EKG IMPRESSION: NORMAL

## 2021-01-15 PROCEDURE — 93010 ELECTROCARDIOGRAM REPORT: CPT | Performed by: INTERNAL MEDICINE

## 2021-01-15 NOTE — TELEPHONE ENCOUNTER
Called [facility] and spoke with Mayra to verify that patient picked up Ciprofloxacin prescription. Medication was picked up 1/13/2021.

## 2021-01-20 ENCOUNTER — OFFICE VISIT (OUTPATIENT)
Dept: WOUND CARE | Facility: MEDICAL CENTER | Age: 66
End: 2021-01-20
Attending: NURSE PRACTITIONER
Payer: MEDICARE

## 2021-01-20 VITALS
DIASTOLIC BLOOD PRESSURE: 97 MMHG | HEART RATE: 113 BPM | OXYGEN SATURATION: 97 % | TEMPERATURE: 98.7 F | RESPIRATION RATE: 20 BRPM | SYSTOLIC BLOOD PRESSURE: 161 MMHG

## 2021-01-20 DIAGNOSIS — R52 PAIN ASSOCIATED WITH WOUND: ICD-10-CM

## 2021-01-20 DIAGNOSIS — T14.8XXA WOUND INFECTION: ICD-10-CM

## 2021-01-20 DIAGNOSIS — T14.8XXA PAIN ASSOCIATED WITH WOUND: ICD-10-CM

## 2021-01-20 DIAGNOSIS — M21.962 ACQUIRED DEFORMITY OF LEFT ANKLE AND FOOT: ICD-10-CM

## 2021-01-20 DIAGNOSIS — L97.922 SKIN ULCER OF LEFT LOWER LEG WITH FAT LAYER EXPOSED (HCC): Primary | ICD-10-CM

## 2021-01-20 DIAGNOSIS — I87.8 CHRONIC VENOUS STASIS: ICD-10-CM

## 2021-01-20 DIAGNOSIS — L08.9 WOUND INFECTION: ICD-10-CM

## 2021-01-20 PROCEDURE — 11045 DBRDMT SUBQ TISS EACH ADDL: CPT

## 2021-01-20 PROCEDURE — 11042 DBRDMT SUBQ TIS 1ST 20SQCM/<: CPT

## 2021-01-20 PROCEDURE — 99213 OFFICE O/P EST LOW 20 MIN: CPT | Mod: 25 | Performed by: NURSE PRACTITIONER

## 2021-01-20 PROCEDURE — 11042 DBRDMT SUBQ TIS 1ST 20SQCM/<: CPT | Performed by: NURSE PRACTITIONER

## 2021-01-20 PROCEDURE — 11045 DBRDMT SUBQ TISS EACH ADDL: CPT | Performed by: NURSE PRACTITIONER

## 2021-01-20 PROCEDURE — 99213 OFFICE O/P EST LOW 20 MIN: CPT | Mod: 25

## 2021-01-20 ASSESSMENT — ENCOUNTER SYMPTOMS
CLAUDICATION: 0
CONSTIPATION: 0
FEVER: 0
COUGH: 0
DIARRHEA: 0
CHILLS: 0
NERVOUS/ANXIOUS: 0
VOMITING: 0
MYALGIAS: 0
SHORTNESS OF BREATH: 0
DEPRESSION: 1
NAUSEA: 0

## 2021-01-20 ASSESSMENT — PAIN SCALES - GENERAL: PAINLEVEL: 6=MODERATE PAIN

## 2021-01-20 NOTE — PROGRESS NOTES
Provider Encounter- Full Thickness wound    HISTORY OF PRESENT ILLNESS  Wound History:    START OF CARE IN CLINIC: 5/20/2020               REFERRING PROVIDER: KEV Keith                 WOUND ETIOLOGY: venous,  likely mixed etiology              LOCATION: Left lower leg, multiple wounds                                   Right lower leg, multiple wounds-first observed in clinic on initial evaluation, 6/5/2020.  Resolved 9/2/2020                HISTORY: Patient is very well-known to this clinic from previous treatment of wounds to his left lower extremity.  He was discharged from the clinic in March 2020 due to full resolution of his wound.  He returned to the clinic approximately 3 weeks later, on 4/16, with cellulitis, edema, and large open wounds to his left lower leg, and was sent directly to [facility] emergency room.  He was admitted for IV antibiotics and surgical intervention.  On 4/22/2020, he underwent an I&D of his left lower extremity, fasciotomy, and VAC placement.  In the following weeks he underwent surgery 3 more times for irrigation and debridement.  He was discharged home on 5/21, on the VAC, with home health and a referral to [facility].       Pertinent Medical History:  Anxiety, Charcot's joint of left foot, non-diabetic (3/21/2016), Chronic congestive heart failure (HCC) (11/16/2017), Hepatitis C, chronic (HCC), Hypertension, Migraine, Polysubstance abuse (HCC) (3/8/2018), Tobacco use (4/18/2016), Ulcer of left lower extremity with necrosis of muscle (HCC) (3/21/2016), and Venous stasis ulcer ([facility]) (2017).                ETIOLOGY HISTORY:  Vascular Surgeon: Dr. White. Compression Circ-aid. Varicose Veins none visible     TOBACCO USE: Patient denies; patient also denies alcohol and recreational drug use    Patient's problem list, allergies, and current medications reviewed and updated in Epic    Interval History:  6/5/2020: Initial clinic visit with KEV Brantley.  Patient  "returns to clinic after a long hospitalization.  The VAC was held by [facility] due to severe maceration and deterioration of wound beds.  He also presents today with new wounds to his right lower extremity.  He states he does not know how or when these occurred, states, \"they just popped up\".  He is uncertain whether or not he has follow-up appointments with his surgeon.  He does not know what medications he is on.      6/12/2020 : Clinic visit with KEV Brantley.  Goran states he is feeling well today,denies fevers, chills, nausea, vomiting, cough or shortness of breath.  Condition of his periwound skin is significantly improved.  I would like to restart VAC, however he did not bring the VAC with him, and that has already been on for 2-1/2 weeks.  Will likely need to be reordered.  [facility] continues to see him several times per week for dressing changes.  He is tolerating compression without any difficulty.    6/19/2020 : Clinic visit with KEV Brantley.   Goran states he is feeling well today,denies fevers, chills, nausea, vomiting, cough or shortness of breath.  He brought his VAC with him today.  However,  too much slough on wound beds to consider re-applying.  [facility] continues to see him 2 times per week.  I have asked him to bring the VAC with him again next week, and will hopefully place it at that time.  Per patient, dressings are saturated between dressing changes.    6/26/2020 : Clinic visit with KEV Brantley.   Goran states he is feeling well today,denies fevers, chills, nausea, vomiting, cough or shortness of breath.  He brought his VAC with him again today, however we are not able to put it on as he is still having twice drainage.  Sensitivities from culture still pending.  However I did start him on Augmentin today which we are able to obtain from [facility] prior to his leaving the clinic today and had him take one before he left.  [facility] is been seen 2 times per week " in between clinic visits.  We will try and get them to see him over the weekend to do an additional dressing change.  Will adjust antibiotics once sensitivities are known.  Hopefully start VAC next clinic visit.    7/2/2020 : Clinic visit with KEV Brantley.    Goran states he is feeling well today,denies fevers, chills, nausea, vomiting, cough or shortness of breath.  His VAC is here in the clinic, however he does not have another home health appointment until Monday, 4 days from now.  He has been taking Augmentin as prescribed last week.  I informed him that I added Bactrim DS after I got sensitivity results, and that it should be waiting for him at his pharmacy.    7/9/2020: Clinic visit with KEV Luna. Patient states that they are feeling well today.  Patient denies fever, chills, nausea, vomiting, lightheadedness, dizziness, shortness of breath and chest pain.  All wound beds debrided in clinic today patient's wounds continue to progress beefy red granulation tissue to left lower extremity lateral wound tendon is being covered with granulation tissue continue with wound VAC and current plan of care.    7/15/2020 : Clinic visit with KEV Brantley.  Goran states he is feeling well today,denies fevers, chills, nausea, vomiting, cough or shortness of breath.  Home health continues to see him in between clinic visits.  He states he has not been smoking, drinking or using any street drugs.    7/22/2020 : Clinic visit with KEV Brantley.   Goran states he is feeling well today,denies fevers, chills, nausea, vomiting, cough or shortness of breath.  Home health continues to see him in between clinic visits.    7/29/2020: Clinic visit with KEV Luna. Patient states that they are feeling well today.  Patient denies fever, chills, nausea, vomiting, lightheadedness, dizziness, shortness of breath and chest pain.    8/5/2020: Clinic visit with KEV Luna. Patient  states that they are feeling well today.  Patient denies fever, chills, nausea, vomiting, lightheadedness, dizziness, shortness of breath and chest pain.  Patient's wounds are foul-smelling today.  Patient had positive culture we have already ordered the patient Cipro and Augmentin patient reports that he got my message from last night.  Patient reports that he is can  antibiotics tonight in the ED initiate them.  Patient has been referred to infectious disease due to multiple bacterial growth and for their expert opinion.    8/20/2020 : Clinic visit with KEV Brantley.  Goran states he is feeling well, denies fevers, chills, nausea, vomiting, cough or shortness of breath.  He states he has not yet heard from ID.  However, review of referral note indicates that a message was left on his phone earlier today.  Kodi states he will check his message and will call them back.  He completed his antibiotics 2 days ago.    9/2/2020 : Clinic visit with KEV Brantley.   Goran states he is feeling well, denies fevers, chills, nausea, vomiting, cough or shortness of breath.  He was seen by ID yesterday, no new antibiotics ordered, however MRI ordered to assess for OM.  Goran has an appoint with his PCP tomorrow.    9/9/2020 : Clinic visit with KEV Brantley.  Goran states he is feeling well, denies fevers, chills, nausea, vomiting, cough or shortness of breath.  Reviewed MRI results with him in clinic today, no evidence of OM.    9/16/2020 : Clinic visit with KEV Brantley.  Goran presents today looking more disheveled than usual, he wore sunglasses throughout the visit.  His speech is a bit pressured and disorganized.  He denies using any street drugs or drinking alcohol.  His dressings are saturated, and his wounds have deteriorated significantly.  He denies fevers, chills, nausea, vomiting, cough or shortness of breath.  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  Home health continues to see him in between clinic visits for dressing changes.    9/30/2020 : Clinic visit with KEV Brantley.  Goran states he is feeling well today, denies fevers, chills, nausea, vomiting, cough or shortness of breath.  He missed his appointment last week, arrived too late and could not be seen.  He is again wearing his sunglasses throughout today's visit.  He denies using any street drugs or drinking alcohol.  Home health continues to see him in between clinic visits.    10/9/2020: Clinic visit with Abeba ANGULO. Pt denies fevers, chills, nausea, vomiting. Followed by HH. Presents with foul smelling fluorescent green/yellow drainage from wounds and on dressings.     10/21/2020 : Clinic visit with KEV Brantley.  Goran states he is feeling well today, denies fevers, chills, nausea, vomiting, cough or shortness of breath.  He completed his antibiotics a few days ago.  He has not been showering with his wound open, states his house has some, \"plumbing issues\".  Wound has been treated with daily Dakin's moistened gauze.  Patient has been able to change dressing in between clinic visits.    10/28/2020: Clinic visit with KEV Luna. Patient states that they are feeling well today.  Patient denies fever, chills, nausea, vomiting, lightheadedness, dizziness, shortness of breath and chest pain.  We will hold Dakin's application for 1 week.  Left anterior lower extremity wound is progressing however slowly, appears to have red beefy granulation tissue forming.  Medial and posterior wounds continue to wax and wane.      11/11/2020 : Clinic visit with KEV Brantley.  Goran states he is feeling well today, denies fevers, chills, nausea, vomiting, cough or shortness of breath.  Normally home health sees him 2 times per week in between clinic visits.  However, one of his appointments " "was canceled due to illness of home health RN, and he went 5 days in between dressing changes.  He states that during this time the drainage and odor increase significantly.       11/18/2020 : Clinic visit with KEV Brantley.  Goran states that he is feeling well today, denies fevers, chills, nausea, vomiting, cough or shortness of breath.  Denies increasing pain in his left lower extremity states that he noticed more drainage over the past several days.  Home health continues to see him 2 times per week between clinic visits.    11/25/2020 : Clinic visit with JOHNSON Brantley states he is feeling well today, denies fevers, chills, nausea, vomiting, cough or shortness of breath.  He has been taking doxycycline as prescribed last week.  Discussed culture results in clinic today.  Advised him that I would be prescribing amoxicillin in addition to the doxycycline.      12/2/2020 : Clinic visit with JOHNSON Brantley states he is feeling well today, denies fevers, chills, nausea, vomiting, cough or shortness of breath.  He does seem a bit out of sorts today, speech pressured and tangential.  He is wearing his sunglasses throughout today's visit.      12/9/2020 : Clinic visit with JOHNSON Brantley states he is feeling well today, denies fevers, chills, nausea, vomiting, cough or shortness of breath.  His mentation seems much more normal today.  I did question him about his behavior last clinic visit.  He denies using any street drugs, attributed his behavior to his antibiotics..  He did however mention that his wounds always do better with narcotics.  He states he has pain from his wounds and his ankle, \"all the time\".  I informed him that we do not do maintenance narcotics out of this clinic.  Offered referral to pain management, which he stated he would appreciate.  He is also states that he still taking his antibiotics, though he should have completed amoxicillin a week ago, and " doxycycline 11 days ago.    12/16/2020: Clinic visit with KEV Luna. Patient states that they are feeling well today.  Patient denies fever, chills, nausea, vomiting, lightheadedness, dizziness, shortness of breath and chest pain.  Patient seen in clinic today has a significant amount of fluorescent green drainage on wound bandages and over left lower extremity.  Patient culture was positive for Pseudomonas. A 7-day course of Cipro ordered by Jennifer Robb infectious disease specialist.  Patient explained the importance of proper administration of Cipro to help promote wound healing.    12/23/2020: Clinic visit with KEV Luna. Patient states that they are feeling well today.  Patient denies fever, chills, nausea, vomiting, lightheadedness, dizziness, shortness of breath and chest pain.     12/30/2020 : Clinic visit with KEV Brantley.  Goran states he is feeling well, denies fevers, chills, nausea, vomiting, cough or shortness of breath.  He states that home health is asked him to remove his compression wrap and shower before they come to see him.  He stated that I agreed that this would be helpful for his skin, and encouraged him to do so.  At first stated he was having trouble with his shower, but then stated he would be able to comply.  He states he finished his antibiotics a few days ago.    1/6/2021: Clinic visit with KEV Luna. Patient states that they are feeling well today.  Patient denies fever, chills, nausea, vomiting, lightheadedness, dizziness, shortness of breath and chest pain.  Patient's left lower extremity covered in a thick layer of Pseudomonas slough.  Patient has new wound to right lower extremity with Pseudomonas as well.  Initiated Dakin's solution to change daily in clinic.    1/13/2021: Clinic visit with KEV Luna. Patient states that they are feeling well today.  Patient denies fever, chills, nausea, vomiting, lightheadedness,  dizziness, shortness of breath and chest pain.  Patient reports that he did not put Dakin's solution on bandages at last dressing change.  Patient's Pseudomonas is worse.  Therefore, we will place the patient on Cipro and ordered an EKG to evaluate the patient's QTC interval.  Patient educated again to continue Dakin's solution as well as complete the course of antibiotics.    1/20/2021: Clinic visit with KEV Luna. Patient states that they are feeling well today.  Patient denies fever, chills, nausea, vomiting, lightheadedness, dizziness, shortness of breath and chest pain.  Patient is on ciprofloxacin for severe amount of Pseudomonas to left lower extremity.  Patient end date for ciprofloxacin is 1/23/2021.  I believe the patient's leg is getting too saturated with Dakin's solution dressings.  Patient's leg is macerated, wet and still has Pseudomonas however slightly improved.  I believe that Goran would benefit from increased dressing changes.  Furthermore, Goran has been instructed to use 5 gallons of warm water with 1-1/2 teaspoons of bleach solution mixed and soak his leg for approximately 5 to 10 minutes on days that his dressing is to be changed.  This way we can reduce the amount of moisture that is constantly on his leg while still having the beneficial effects of bleach on the Pseudomonas.        REVIEW OF SYSTEMS:   Review of Systems   Constitutional: Negative for chills and fever.        States he is eating well, appetite normal   Respiratory: Negative for cough and shortness of breath.    Cardiovascular: Positive for leg swelling. Negative for chest pain and claudication.        Chronic swelling in legs, for several years   Gastrointestinal: Negative for constipation, diarrhea, nausea and vomiting.   Genitourinary: Negative for dysuria.   Musculoskeletal: Positive for joint pain. Negative for myalgias.        Chronic   Psychiatric/Behavioral: Positive for depression. The patient is not  nervous/anxious.        PHYSICAL EXAMINATION:   BP (!) 161/97   Pulse (!) 113   Temp 37.1 °C (98.7 °F)   Resp 20   SpO2 97%     Physical Exam   Constitutional: He is oriented to person, place, and time and well-developed, well-nourished, and in no distress.   HENT:   Head: Normocephalic and atraumatic.   Eyes: Pupils are equal, round, and reactive to light. Conjunctivae and EOM are normal.   Neck: Neck supple.   Cardiovascular: Intact distal pulses.   Pulmonary/Chest: Effort normal.   Musculoskeletal: Normal range of motion.         General: Deformity and edema present.      Comments: Left charcot foot and ankle  1+pitting edema to bilateral LE   Neurological: He is alert and oriented to person, place, and time.   Skin: Skin is warm.   Large full-thickness wounds to  lower extremities  Maceration, pseudomonal growth, moisture  Refer to wound flowsheet and photos     Psychiatric: Mood, affect and judgment normal.                                                         WOUND ASSESSMENT           Wound 06/05/20 Full Thickness Wound Leg Left --Left Lateral LE (Active)   Wound Image    01/20/21 1300   Site Assessment Red;Yellow;White;Excoriated 01/20/21 1300   Periwound Assessment Maceration;Denuded 01/20/21 1300   Margins Attached edges 01/20/21 1300   Closure Secondary intention 01/20/21 1300   Drainage Amount Copious 01/20/21 1300   Drainage Description Green 01/20/21 1300   Treatments Cleansed;Topical Lidocaine;Provider debridement 01/20/21 1300   Wound Cleansing Foam Cleanser/Washcloth 01/20/21 1300   Periwound Protectant Barrier Paste 01/20/21 1300   Dressing Cleansing/Solutions 1/4 Strength Dakin's Solution 01/13/21 1345   Dressing Options Hydrofiber Silver;Absorbent Abdominal Pad;Dry Roll Gauze;Hypafix Tape;Tubigrip 01/20/21 1300   Dressing Changed Changed 01/20/21 1300   Dressing Status Clean;Dry;Intact 01/20/21 1300   Dressing Change/Treatment Frequency Every 48 hrs, and As Needed 01/20/21 1300   Non-staged  Wound Description Full thickness 01/20/21 1300   Wound Length (cm) 19 cm 01/20/21 1300   Wound Width (cm) 7.8 cm 01/20/21 1300   Wound Depth (cm) 0.1 cm 01/20/21 1300   Wound Surface Area (cm^2) 148.2 cm^2 01/20/21 1300   Wound Volume (cm^3) 14.82 cm^3 01/20/21 1300   Post-Procedure Length (cm) 19.1 cm 01/20/21 1300   Post-Procedure Width (cm) 7.9 cm 01/20/21 1300   Post-Procedure Depth (cm) 0.1 cm 01/20/21 1300   Post-Procedure Surface Area (cm^2) 150.89 cm^2 01/20/21 1300   Post-Procedure Volume (cm^3) 15.09 cm^3 01/20/21 1300   Wound Healing % 73 01/20/21 1300   Wound Bed Granulation (%) 5 % 01/13/21 1345   Wound Bed Epithelium (%) 0 % 01/13/21 1345   Wound Bed Slough (%) 95 % 01/13/21 1345   Wound Bed Eschar (%) 0 % 01/13/21 1345   Wound Bed Granulation (%) - Post-Procedure 100 % 09/30/20 1400   Tunneling (cm) 0 cm 01/20/21 1300   Undermining (cm) 0 cm 01/20/21 1300   Wound Odor Mild 01/20/21 1300   Exposed Structures None 01/20/21 1300       Wound 06/05/20 Full Thickness Wound Ankle LLE distal posteromedial (Active)   Wound Image    01/20/21 1300   Site Assessment Red;Yellow 01/20/21 1300   Periwound Assessment Maceration;Denuded 01/20/21 1300   Margins Attached edges 01/20/21 1300   Closure Secondary intention 01/20/21 1300   Drainage Amount Copious 01/20/21 1300   Drainage Description Green 01/20/21 1300   Treatments Cleansed;Topical Lidocaine;Provider debridement 01/20/21 1300   Wound Cleansing Foam Cleanser/Washcloth 01/20/21 1300   Periwound Protectant Barrier Paste 01/20/21 1300   Dressing Cleansing/Solutions 1/4 Strength Dakin's Solution 01/13/21 1345   Dressing Options Hydrofiber Silver;Absorbent Abdominal Pad;Dry Roll Gauze;Hypafix Tape;Tubigrip 01/20/21 1300   Dressing Changed Changed 01/20/21 1300   Dressing Status Clean;Dry;Intact 01/20/21 1300   Dressing Change/Treatment Frequency Every 48 hrs, and As Needed 01/20/21 1300   Non-staged Wound Description Full thickness 01/20/21 1300   Wound Length  (cm) 3.5 cm 01/20/21 1300   Wound Width (cm) 3.3 cm 01/20/21 1300   Wound Depth (cm) 0.3 cm 01/20/21 1300   Wound Surface Area (cm^2) 11.55 cm^2 01/20/21 1300   Wound Volume (cm^3) 3.46 cm^3 01/20/21 1300   Post-Procedure Length (cm) 4.1 cm 01/20/21 1300   Post-Procedure Width (cm) 3.7 cm 01/20/21 1300   Post-Procedure Depth (cm) 0.3 cm 01/20/21 1300   Post-Procedure Surface Area (cm^2) 15.17 cm^2 01/20/21 1300   Post-Procedure Volume (cm^3) 4.55 cm^3 01/20/21 1300   Wound Healing % 41 01/20/21 1300   Wound Bed Granulation (%) 0 % 01/13/21 1345   Wound Bed Epithelium (%) 0 % 01/13/21 1345   Wound Bed Slough (%) 100 % 01/13/21 1345   Wound Bed Eschar (%) 0 % 01/13/21 1345   Wound Bed Granulation (%) - Post-Procedure 100 % 09/30/20 1400   Tunneling (cm) 0 cm 01/20/21 1300   Undermining (cm) 0 cm 01/20/21 1300   Wound Odor None 01/20/21 1300   Exposed Structures None 01/20/21 1300       Wound 06/05/20 Full Thickness Wound Leg Left LLE posteromedial proximal (Active)   Wound Image    01/20/21 1300   Site Assessment Yellow 01/20/21 1300   Periwound Assessment Edema;Denuded;Maceration 01/20/21 1300   Margins Attached edges 01/20/21 1300   Closure Secondary intention 01/20/21 1300   Drainage Amount Copious 01/20/21 1300   Drainage Description Green 01/20/21 1300   Treatments Cleansed;Topical Lidocaine;Provider debridement 01/20/21 1300   Wound Cleansing Foam Cleanser/Washcloth 01/20/21 1300   Periwound Protectant Barrier Paste 01/20/21 1300   Dressing Cleansing/Solutions 1/4 Strength Dakin's Solution 01/13/21 1345   Dressing Options Hydrofiber Silver;Absorbent Abdominal Pad;Dry Roll Gauze;Hypafix Tape;Tubigrip 01/20/21 1300   Dressing Changed Changed 01/20/21 1300   Dressing Status Clean;Dry;Intact 01/20/21 1300   Dressing Change/Treatment Frequency Every 48 hrs, and As Needed 01/20/21 1300   Non-staged Wound Description Full thickness 01/20/21 1300   Wound Length (cm) 9 cm 01/20/21 1300   Wound Width (cm) 8 cm 01/20/21  1300   Wound Depth (cm) 0.4 cm 01/20/21 1300   Wound Surface Area (cm^2) 72 cm^2 01/20/21 1300   Wound Volume (cm^3) 28.8 cm^3 01/20/21 1300   Post-Procedure Length (cm) 9.1 cm 01/20/21 1300   Post-Procedure Width (cm) 8.6 cm 01/20/21 1300   Post-Procedure Depth (cm) 0.4 cm 01/20/21 1300   Post-Procedure Surface Area (cm^2) 78.26 cm^2 01/20/21 1300   Post-Procedure Volume (cm^3) 31.3 cm^3 01/20/21 1300   Wound Healing % -281 01/20/21 1300   Wound Bed Granulation (%) 10 % 09/30/20 1400   Wound Bed Slough (%) 75 % 10/21/20 1413   Wound Bed Granulation (%) - Post-Procedure 100 % 09/30/20 1400   Tunneling (cm) 0 cm 01/20/21 1300   Undermining (cm) 0 cm 01/20/21 1300   Wound Odor None 01/20/21 1300   Exposed Structures None 01/20/21 1300       Wound 01/13/21 Full Thickness Wound Ankle Anterior Left --Left Anterior Ankle (Active)   Wound Image    01/20/21 1300   Site Assessment Red;Yellow;White;Slough;Other (Comment) 01/20/21 1300   Periwound Assessment Maceration;Denuded 01/20/21 1300   Margins Unattached edges 01/20/21 1300   Drainage Amount Copious 01/20/21 1300   Drainage Description Green 01/20/21 1300   Treatments Cleansed;Topical Lidocaine;Provider debridement 01/20/21 1300   Wound Cleansing Foam Cleanser/Washcloth 01/20/21 1300   Periwound Protectant Barrier Paste 01/20/21 1300   Dressing Cleansing/Solutions 1/4 Strength Dakin's Solution 01/13/21 1345   Dressing Options Hydrofiber Silver;Absorbent Abdominal Pad;Dry Roll Gauze;Hypafix Tape;Tubigrip 01/20/21 1300   Dressing Changed Changed 01/20/21 1300   Dressing Status Clean;Dry;Intact 01/20/21 1300   Dressing Change/Treatment Frequency Every 48 hrs, and As Needed 01/20/21 1300   Non-staged Wound Description Full thickness 01/20/21 1300   Wound Length (cm) 4.3 cm 01/20/21 1300   Wound Width (cm) 2.2 cm 01/20/21 1300   Wound Depth (cm) 0.1 cm 01/20/21 1300   Wound Surface Area (cm^2) 9.46 cm^2 01/20/21 1300   Wound Volume (cm^3) 0.95 cm^3 01/20/21 1300    Post-Procedure Length (cm) 4.8 cm 01/20/21 1300   Post-Procedure Width (cm) 2 cm 01/20/21 1300   Post-Procedure Depth (cm) 0.1 cm 01/20/21 1300   Post-Procedure Surface Area (cm^2) 9.6 cm^2 01/20/21 1300   Post-Procedure Volume (cm^3) 0.96 cm^3 01/20/21 1300   Tunneling (cm) 0 cm 01/20/21 1300   Undermining (cm) 0 cm 01/20/21 1300   Wound Odor Mild 01/20/21 1300   Exposed Structures None 01/20/21 1300       Wound 01/20/21 LLE Medial Proximal (Active)   Wound Image    01/20/21 1300   Site Assessment Red;Yellow 01/20/21 1300   Periwound Assessment Excoriated;White;Pink 01/20/21 1300   Margins Attached edges 01/20/21 1300   Closure Secondary intention 01/20/21 1300   Drainage Amount Copious 01/20/21 1300   Drainage Description Green 01/20/21 1300   Treatments Cleansed;Topical Lidocaine;Provider debridement 01/20/21 1300   Wound Cleansing Foam Cleanser/Washcloth 01/20/21 1300   Periwound Protectant Barrier Paste 01/20/21 1300   Dressing Options Hydrofiber Silver;Absorbent Abdominal Pad;Dry Roll Gauze;Hypafix Tape;Tubigrip 01/20/21 1300   Dressing Changed Changed 01/20/21 1300   Dressing Status Clean;Dry;Intact 01/20/21 1300   Non-staged Wound Description Full thickness 01/20/21 1300   Wound Length (cm) 3.9 cm 01/20/21 1300   Wound Width (cm) 3 cm 01/20/21 1300   Wound Depth (cm) 0.1 cm 01/20/21 1300   Wound Surface Area (cm^2) 11.7 cm^2 01/20/21 1300   Wound Volume (cm^3) 1.17 cm^3 01/20/21 1300   Post-Procedure Length (cm) 4.1 cm 01/20/21 1300   Post-Procedure Width (cm) 3.2 cm 01/20/21 1300   Post-Procedure Depth (cm) 0.1 cm 01/20/21 1300   Post-Procedure Surface Area (cm^2) 13.12 cm^2 01/20/21 1300   Post-Procedure Volume (cm^3) 1.31 cm^3 01/20/21 1300   Tunneling (cm) 0 cm 01/20/21 1300   Undermining (cm) 0 cm 01/20/21 1300   Wound Odor Mild 01/20/21 1300   Exposed Structures None 01/20/21 1300       Wound 01/20/21 Left Lateral Foot (Active)   Wound Image    01/20/21 1300   Site Assessment Red;Yellow 01/20/21 1300    Periwound Assessment Edema;Maceration;Denuded 01/20/21 1300   Margins Attached edges 01/20/21 1300   Closure Secondary intention 01/20/21 1300   Drainage Amount Copious 01/20/21 1300   Drainage Description Green 01/20/21 1300   Treatments Cleansed;Topical Lidocaine;Provider debridement 01/20/21 1300   Wound Cleansing Foam Cleanser/Washcloth 01/20/21 1300   Periwound Protectant Barrier Paste 01/20/21 1300   Dressing Options Hydrofiber Silver;Absorbent Abdominal Pad;Dry Roll Gauze;Hypafix Tape;Tubigrip 01/20/21 1300   Dressing Changed Changed 01/20/21 1300   Dressing Status Clean;Dry;Intact 01/20/21 1300   Dressing Change/Treatment Frequency Every 48 hrs, and As Needed 01/20/21 1300   Non-staged Wound Description Full thickness 01/20/21 1300   Wound Length (cm) 4.1 cm 01/20/21 1300   Wound Width (cm) 2.1 cm 01/20/21 1300   Wound Depth (cm) 0.1 cm 01/20/21 1300   Wound Surface Area (cm^2) 8.61 cm^2 01/20/21 1300   Wound Volume (cm^3) 0.86 cm^3 01/20/21 1300   Post-Procedure Length (cm) 5.4 cm 01/20/21 1300   Post-Procedure Width (cm) 6.8 cm 01/20/21 1300   Post-Procedure Depth (cm) 0.1 cm 01/20/21 1300   Post-Procedure Surface Area (cm^2) 36.72 cm^2 01/20/21 1300   Post-Procedure Volume (cm^3) 3.67 cm^3 01/20/21 1300   Tunneling (cm) 0 cm 01/20/21 1300   Undermining (cm) 0 cm 01/20/21 1300   Wound Odor Mild 01/20/21 1300   Exposed Structures None 01/20/21 1300       Wound 01/20/21 LLE Posterior Proximal Cluster (Active)   Wound Image    01/20/21 1300   Site Assessment Red;Yellow 01/20/21 1300   Periwound Assessment White;Maceration 01/20/21 1300   Margins Attached edges 01/20/21 1300   Closure Secondary intention 01/20/21 1300   Drainage Amount Copious 01/20/21 1300   Treatments Cleansed;Topical Lidocaine;Provider debridement 01/20/21 1300   Wound Cleansing Foam Cleanser/Washcloth 01/20/21 1300   Periwound Protectant Barrier Paste 01/20/21 1300   Dressing Options Hydrofiber Silver;Absorbent Abdominal Pad;Dry Roll  Gauze;Hypafix Tape;Tubigrip 21 1300   Dressing Changed Changed 21 1300   Dressing Status Clean;Dry;Intact 21 1300   Wound Length (cm) 2.8 cm 21 1300   Wound Width (cm) 4.1 cm 21 1300   Wound Depth (cm) 0.1 cm 21 1300   Wound Surface Area (cm^2) 11.48 cm^2 21 1300   Wound Volume (cm^3) 1.15 cm^3 21 1300   Post-Procedure Length (cm) 3.1 cm 21 1300   Post-Procedure Width (cm) 4.2 cm 21 1300   Post-Procedure Depth (cm) 0.1 cm 21 1300   Post-Procedure Surface Area (cm^2) 13.02 cm^2 21 1300   Post-Procedure Volume (cm^3) 1.3 cm^3 21 1300   Tunneling (cm) 0 cm 21 1300   Undermining (cm) 0 cm 21 1300   Wound Odor Mild 21 1300   Exposed Structures None 21 1300       Wound 21 Right Lateral Ankle (Active)          PROCEDURE: Excisional debridement of left lower extremity wounds   -2% viscous lidocaine applied topically to wound beds for approximately 10 minutes prior to debridement  -Curette used to excise thick layer of slough from wound beds.  Excisional debridement was performed to remove devitalized tissue from all other wounds until healthy, bleeding tissue was visualized.  Total area debrided approximately (100% accuracy of wound measurement difficult due to the diffuse nature of the wounds) 200 cm² tissue debrided into the subcutaneous layer of all wounds.    -Bleeding controlled with manual pressure.    -Wounds cleansed with normal saline  -Patient tolerated the procedure well, without c/o pain or discomfort.         Pertinent Labs and Diagnostics:    Labs:     A1c:   Lab Results   Component Value Date/Time    HBA1C 5.7 (H) 2020 11:22 AM      Imagin/3/2020-MRI left tibia-fibula with and without contrast  IMPRESSION:     Moderate lateral leg cellulitis and underlying myositis     No abscess or osteomyelitis    VASCULAR STUDIES: [facility] 2019   Right.    Doppler waveform of the common femoral artery is  of high amplitude and    triphasic.    Doppler waveforms at the ankle are brisk and triphasic.    Ankle-brachial index is normal.       Left.    Could not perform ankle-brachial index due to large blisters/ulcers.   Normal toe-brachial index: 0.94   Doppler waveform of the common femoral artery is of high amplitude and    triphasic.    Biphasic waveforms seen at the ankle.     WOUND CULTURE:    11/18/2020: Culture collected in clinic  Culture Result Abnormal   Staphylococcus aureus   Light growth     Culture Result Abnormal   Pseudomonas aeruginosa   Light growth     Culture Result Abnormal   Enterococcus faecalis   Light growth              ASSESSMENT AND PLAN:     1. Skin ulcer of left lower leg with fat layer exposed (HCC)  Comment: Patient has history of recurring ulcerations to left lower extremity.  Has undergone ablation procedures by Dr. White.  Discharged from [facility] in March 2020 due to healing, returned approximately 3 weeks later much worse.  Hospitalized from 4/16 until 5/21/2020 underwent multiple surgical debridements.    01/20/21: Severe amount of drainage to left lower extremity with pseudomonal growth reduced and slough.  -Excisional debridement of wounds in clinic today, medically necessary to promote wound healing.  -Patient to return to clinic weekly for assessment and debridement  -Home health to change dressing 3 times per week in between clinic visits until we can establish a dry periwound environment without Pseudomonas.  -Patient is to soak left leg in the above-mentioned solution for approximately 5 to 10 minutes prior to wound dressing change by home health team.  -Oral Cipro 500 mg twice daily completion date 1/23/2021    Wound care:     2. Chronic venous stasis  Comments: Hemosiderin staining, brawny edema, scarring- findings consistent with CVI.  Pt established at [facility] [place], underwent ablation procedures in 2019.     -Consider referral back to vascular if wounds failed to progress  "or deteriorate.    3. Acquired deformity of left ankle and foot  Comments: Charcot deformity of foot and ankle.  Very little ROM of ankle.  Patient was referred to orthopedics previously, however did not make appointment.      -Patient will need to follow-up with orthopedics once wounds are healed.    4.  Wound infection     01/20/21:   -Patient to continue Dakin's solution for 1 more week to cut down on Pseudomonas lobe.  -Ciprofloxacin 500 mg twice daily end date 1/23/2021  -Patient advised to take 1-1/2 teaspoons of bleach and mixed with 5 gallons of water.  Patient reports that he will get a large bucket to complete this task.  Patient reports that his tub and shower are, \"used by other family members\" he does not want to put his pseudomonal leg in the bathtub that other family members will be using.  - Pt advised to go to ER for any increased redness, swelling, drainage or odor, or if he develops fever, chills, nausea or vomiting.      5.  Pain associated with wound    01/20/21:   -2% viscous lidocaine applied topically to wound bed for approximately 5 minutes prior to debridement  -Patient tolerated procedure today with no complaints of discomfort.    >20 minutes spent with patient coordinating care, addressing home health therapy visits per week, discussing the use of bleach to reduce pseudomonal component of his wounds.  Further education regarding reduction of moisture to prevent maceration C interval history as well as plan of care.    Please note that this note may have been created using voice recognition software. I have worked with technical experts from [facility] to optimize the interface.  I have made every reasonable attempt to correct obvious errors, but there may be errors of grammar and possibly content that I did not discover before finalizing the note.    N    "

## 2021-01-21 NOTE — PATIENT INSTRUCTIONS
-Keep your wound dressing clean, dry, and intact.    -Change your dressing if it becomes soiled, soaked, or falls off.    -Should you experience any significant changes in your wound(s), such as infection (redness, swelling, localized heat, increased pain, fever > 101 F, chills) or have any questions regarding your home care instructions, please contact the wound center at [phone]. If after hours, contact your primary care physician or go to the hospital emergency room.

## 2021-01-21 NOTE — NON-PROVIDER
Patient instructed to perform bleach baths to LLE prior to home health coming to visit. Patient given written instructions to mix 1.5 teaspoons bleach with 5 gallons of water. Home health orders filled out and faxed to [facility] .

## 2021-01-27 ENCOUNTER — APPOINTMENT (OUTPATIENT)
Dept: WOUND CARE | Facility: MEDICAL CENTER | Age: 66
End: 2021-01-27
Attending: NURSE PRACTITIONER
Payer: MEDICARE

## 2021-02-03 ENCOUNTER — OFFICE VISIT (OUTPATIENT)
Dept: WOUND CARE | Facility: MEDICAL CENTER | Age: 66
End: 2021-02-03
Attending: NURSE PRACTITIONER
Payer: MEDICARE

## 2021-02-03 VITALS
SYSTOLIC BLOOD PRESSURE: 130 MMHG | OXYGEN SATURATION: 96 % | TEMPERATURE: 98.6 F | DIASTOLIC BLOOD PRESSURE: 91 MMHG | RESPIRATION RATE: 16 BRPM | HEART RATE: 69 BPM

## 2021-02-03 DIAGNOSIS — L97.922 SKIN ULCER OF LEFT LOWER LEG WITH FAT LAYER EXPOSED (HCC): Primary | ICD-10-CM

## 2021-02-03 DIAGNOSIS — L08.9 WOUND INFECTION: ICD-10-CM

## 2021-02-03 DIAGNOSIS — T14.8XXA WOUND INFECTION: ICD-10-CM

## 2021-02-03 DIAGNOSIS — E78.49 OTHER HYPERLIPIDEMIA: ICD-10-CM

## 2021-02-03 DIAGNOSIS — I87.8 CHRONIC VENOUS STASIS: ICD-10-CM

## 2021-02-03 DIAGNOSIS — T14.8XXA PAIN ASSOCIATED WITH WOUND: ICD-10-CM

## 2021-02-03 DIAGNOSIS — R52 PAIN ASSOCIATED WITH WOUND: ICD-10-CM

## 2021-02-03 DIAGNOSIS — M21.962 ACQUIRED DEFORMITY OF LEFT ANKLE AND FOOT: ICD-10-CM

## 2021-02-03 PROCEDURE — 11045 DBRDMT SUBQ TISS EACH ADDL: CPT | Performed by: NURSE PRACTITIONER

## 2021-02-03 PROCEDURE — 99213 OFFICE O/P EST LOW 20 MIN: CPT | Mod: 25

## 2021-02-03 PROCEDURE — 11042 DBRDMT SUBQ TIS 1ST 20SQCM/<: CPT | Performed by: NURSE PRACTITIONER

## 2021-02-03 PROCEDURE — 11042 DBRDMT SUBQ TIS 1ST 20SQCM/<: CPT

## 2021-02-03 PROCEDURE — 99213 OFFICE O/P EST LOW 20 MIN: CPT | Mod: 25 | Performed by: NURSE PRACTITIONER

## 2021-02-03 PROCEDURE — 11045 DBRDMT SUBQ TISS EACH ADDL: CPT

## 2021-02-03 RX ORDER — CIPROFLOXACIN 500 MG/1
500 TABLET, FILM COATED ORAL 2 TIMES DAILY
Qty: 20 TAB | Refills: 0 | Status: SHIPPED | OUTPATIENT
Start: 2021-02-03 | End: 2021-02-13

## 2021-02-03 RX ORDER — ATORVASTATIN CALCIUM 40 MG/1
TABLET, FILM COATED ORAL
Qty: 30 TAB | Refills: 11 | Status: SHIPPED | OUTPATIENT
Start: 2021-02-03 | End: 2022-03-07

## 2021-02-03 ASSESSMENT — ENCOUNTER SYMPTOMS
COUGH: 0
PALPITATIONS: 0
FEVER: 0
CLAUDICATION: 0
DEPRESSION: 1
NAUSEA: 0
WEAKNESS: 0
DIZZINESS: 0
VOMITING: 0
MYALGIAS: 0
SHORTNESS OF BREATH: 0
CHILLS: 0
WHEEZING: 0
HEADACHES: 0

## 2021-02-03 NOTE — PROGRESS NOTES
No changes to wound care orders for home health.  Wound care orders faxed to [facility] #[phone]

## 2021-02-03 NOTE — PATIENT INSTRUCTIONS
-Keep dressings clean and dry. Change dressings if they become over saturated, soiled or fall off.     -Avoid prolonged standing or sitting without elevating your legs.    -Remove your compression garments if you have severe pain, severe swelling, numbness, color change, or temperature change in your toes. If you need to remove your compression garments, do so by unrolling them. Do not cut the compression garments off, this is to prevent cutting yourself on accident.    -Should you experience any significant changes in your wound(s), such as infection (redness, swelling, localized heat, increased pain, fever > 101 F, chills) or have any questions regarding your home care instructions, please contact the wound center at [phone]. If after hours, contact your primary care physician or go to the hospital emergency room.

## 2021-02-04 NOTE — PROGRESS NOTES
Provider Encounter- Full Thickness wound    HISTORY OF PRESENT ILLNESS  Wound History:    START OF CARE IN CLINIC: 5/20/2020               REFERRING PROVIDER: KEV Keith                 WOUND ETIOLOGY: venous,  likely mixed etiology              LOCATION: Left lower leg, multiple wounds                                   Right lower leg, multiple wounds-first observed in clinic on initial evaluation, 6/5/2020.  Resolved 9/2/2020                HISTORY: Patient is very well-known to this clinic from previous treatment of wounds to his left lower extremity.  He was discharged from the clinic in March 2020 due to full resolution of his wound.  He returned to the clinic approximately 3 weeks later, on 4/16, with cellulitis, edema, and large open wounds to his left lower leg, and was sent directly to [facility] emergency room.  He was admitted for IV antibiotics and surgical intervention.  On 4/22/2020, he underwent an I&D of his left lower extremity, fasciotomy, and VAC placement.  In the following weeks he underwent surgery 3 more times for irrigation and debridement.  He was discharged home on 5/21, on the VAC, with home health and a referral to [facility].       Pertinent Medical History:  Anxiety, Charcot's joint of left foot, non-diabetic (3/21/2016), Chronic congestive heart failure (HCC) (11/16/2017), Hepatitis C, chronic (HCC), Hypertension, Migraine, Polysubstance abuse (HCC) (3/8/2018), Tobacco use (4/18/2016), Ulcer of left lower extremity with necrosis of muscle (HCC) (3/21/2016), and Venous stasis ulcer ([facility]) (2017).                ETIOLOGY HISTORY:  Vascular Surgeon: Dr. White. Compression Circ-aid. Varicose Veins none visible     TOBACCO USE: Patient denies; patient also denies alcohol and recreational drug use    Patient's problem list, allergies, and current medications reviewed and updated in Epic    Interval History:  6/5/2020: Initial clinic visit with KEV Brantley.  Patient  "returns to clinic after a long hospitalization.  The VAC was held by [facility] due to severe maceration and deterioration of wound beds.  He also presents today with new wounds to his right lower extremity.  He states he does not know how or when these occurred, states, \"they just popped up\".  He is uncertain whether or not he has follow-up appointments with his surgeon.  He does not know what medications he is on.      6/12/2020 : Clinic visit with KEV Brantley.  Goran states he is feeling well today,denies fevers, chills, nausea, vomiting, cough or shortness of breath.  Condition of his periwound skin is significantly improved.  I would like to restart VAC, however he did not bring the VAC with him, and that has already been on for 2-1/2 weeks.  Will likely need to be reordered.  [facility] continues to see him several times per week for dressing changes.  He is tolerating compression without any difficulty.    6/19/2020 : Clinic visit with KEV Brantley.   Goran states he is feeling well today,denies fevers, chills, nausea, vomiting, cough or shortness of breath.  He brought his VAC with him today.  However,  too much slough on wound beds to consider re-applying.  [facility] continues to see him 2 times per week.  I have asked him to bring the VAC with him again next week, and will hopefully place it at that time.  Per patient, dressings are saturated between dressing changes.    6/26/2020 : Clinic visit with KEV Brantley.   Goran states he is feeling well today,denies fevers, chills, nausea, vomiting, cough or shortness of breath.  He brought his VAC with him again today, however we are not able to put it on as he is still having twice drainage.  Sensitivities from culture still pending.  However I did start him on Augmentin today which we are able to obtain from [facility] prior to his leaving the clinic today and had him take one before he left.  [facility] is been seen 2 times per week " in between clinic visits.  We will try and get them to see him over the weekend to do an additional dressing change.  Will adjust antibiotics once sensitivities are known.  Hopefully start VAC next clinic visit.    7/2/2020 : Clinic visit with KEV Brantley.    Goran states he is feeling well today,denies fevers, chills, nausea, vomiting, cough or shortness of breath.  His VAC is here in the clinic, however he does not have another home health appointment until Monday, 4 days from now.  He has been taking Augmentin as prescribed last week.  I informed him that I added Bactrim DS after I got sensitivity results, and that it should be waiting for him at his pharmacy.    7/9/2020: Clinic visit with KEV Luna. Patient states that they are feeling well today.  Patient denies fever, chills, nausea, vomiting, lightheadedness, dizziness, shortness of breath and chest pain.  All wound beds debrided in clinic today patient's wounds continue to progress beefy red granulation tissue to left lower extremity lateral wound tendon is being covered with granulation tissue continue with wound VAC and current plan of care.    7/15/2020 : Clinic visit with KEV Brantley.  Goran states he is feeling well today,denies fevers, chills, nausea, vomiting, cough or shortness of breath.  Home health continues to see him in between clinic visits.  He states he has not been smoking, drinking or using any street drugs.    7/22/2020 : Clinic visit with KEV Brantley.   Goran states he is feeling well today,denies fevers, chills, nausea, vomiting, cough or shortness of breath.  Home health continues to see him in between clinic visits.    7/29/2020: Clinic visit with KEV Luna. Patient states that they are feeling well today.  Patient denies fever, chills, nausea, vomiting, lightheadedness, dizziness, shortness of breath and chest pain.    8/5/2020: Clinic visit with KEV Luna. Patient  states that they are feeling well today.  Patient denies fever, chills, nausea, vomiting, lightheadedness, dizziness, shortness of breath and chest pain.  Patient's wounds are foul-smelling today.  Patient had positive culture we have already ordered the patient Cipro and Augmentin patient reports that he got my message from last night.  Patient reports that he is can  antibiotics tonight in the ED initiate them.  Patient has been referred to infectious disease due to multiple bacterial growth and for their expert opinion.    8/20/2020 : Clinic visit with KEV Brantley.  Goran states he is feeling well, denies fevers, chills, nausea, vomiting, cough or shortness of breath.  He states he has not yet heard from ID.  However, review of referral note indicates that a message was left on his phone earlier today.  Kodi states he will check his message and will call them back.  He completed his antibiotics 2 days ago.    9/2/2020 : Clinic visit with KEV Brantley.   Goran states he is feeling well, denies fevers, chills, nausea, vomiting, cough or shortness of breath.  He was seen by ID yesterday, no new antibiotics ordered, however MRI ordered to assess for OM.  Goran has an appoint with his PCP tomorrow.    9/9/2020 : Clinic visit with KEV Brantley.  Goran states he is feeling well, denies fevers, chills, nausea, vomiting, cough or shortness of breath.  Reviewed MRI results with him in clinic today, no evidence of OM.    9/16/2020 : Clinic visit with KEV Brantley.  Goran presents today looking more disheveled than usual, he wore sunglasses throughout the visit.  His speech is a bit pressured and disorganized.  He denies using any street drugs or drinking alcohol.  His dressings are saturated, and his wounds have deteriorated significantly.  He denies fevers, chills, nausea, vomiting, cough or shortness of breath.  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  Home health continues to see him in between clinic visits for dressing changes.    9/30/2020 : Clinic visit with KEV Brantley.  Goran states he is feeling well today, denies fevers, chills, nausea, vomiting, cough or shortness of breath.  He missed his appointment last week, arrived too late and could not be seen.  He is again wearing his sunglasses throughout today's visit.  He denies using any street drugs or drinking alcohol.  Home health continues to see him in between clinic visits.    10/9/2020: Clinic visit with Abeba ANGULO. Pt denies fevers, chills, nausea, vomiting. Followed by HH. Presents with foul smelling fluorescent green/yellow drainage from wounds and on dressings.     10/21/2020 : Clinic visit with KEV Brantley.  Goran states he is feeling well today, denies fevers, chills, nausea, vomiting, cough or shortness of breath.  He completed his antibiotics a few days ago.  He has not been showering with his wound open, states his house has some, \"plumbing issues\".  Wound has been treated with daily Dakin's moistened gauze.  Patient has been able to change dressing in between clinic visits.    10/28/2020: Clinic visit with KEV Luna. Patient states that they are feeling well today.  Patient denies fever, chills, nausea, vomiting, lightheadedness, dizziness, shortness of breath and chest pain.  We will hold Dakin's application for 1 week.  Left anterior lower extremity wound is progressing however slowly, appears to have red beefy granulation tissue forming.  Medial and posterior wounds continue to wax and wane.      11/11/2020 : Clinic visit with KEV Brantley.  Goran states he is feeling well today, denies fevers, chills, nausea, vomiting, cough or shortness of breath.  Normally home health sees him 2 times per week in between clinic visits.  However, one of his appointments " "was canceled due to illness of home health RN, and he went 5 days in between dressing changes.  He states that during this time the drainage and odor increase significantly.       11/18/2020 : Clinic visit with KEV Brantley.  Goran states that he is feeling well today, denies fevers, chills, nausea, vomiting, cough or shortness of breath.  Denies increasing pain in his left lower extremity states that he noticed more drainage over the past several days.  Home health continues to see him 2 times per week between clinic visits.    11/25/2020 : Clinic visit with JOHNSON Brantley states he is feeling well today, denies fevers, chills, nausea, vomiting, cough or shortness of breath.  He has been taking doxycycline as prescribed last week.  Discussed culture results in clinic today.  Advised him that I would be prescribing amoxicillin in addition to the doxycycline.      12/2/2020 : Clinic visit with JOHNSON Brantley states he is feeling well today, denies fevers, chills, nausea, vomiting, cough or shortness of breath.  He does seem a bit out of sorts today, speech pressured and tangential.  He is wearing his sunglasses throughout today's visit.      12/9/2020 : Clinic visit with JOHNSON Brantley states he is feeling well today, denies fevers, chills, nausea, vomiting, cough or shortness of breath.  His mentation seems much more normal today.  I did question him about his behavior last clinic visit.  He denies using any street drugs, attributed his behavior to his antibiotics..  He did however mention that his wounds always do better with narcotics.  He states he has pain from his wounds and his ankle, \"all the time\".  I informed him that we do not do maintenance narcotics out of this clinic.  Offered referral to pain management, which he stated he would appreciate.  He is also states that he still taking his antibiotics, though he should have completed amoxicillin a week ago, and " doxycycline 11 days ago.    12/16/2020: Clinic visit with KEV Luna. Patient states that they are feeling well today.  Patient denies fever, chills, nausea, vomiting, lightheadedness, dizziness, shortness of breath and chest pain.  Patient seen in clinic today has a significant amount of fluorescent green drainage on wound bandages and over left lower extremity.  Patient culture was positive for Pseudomonas. A 7-day course of Cipro ordered by Jennifer Robb infectious disease specialist.  Patient explained the importance of proper administration of Cipro to help promote wound healing.    12/23/2020: Clinic visit with KEV Luna. Patient states that they are feeling well today.  Patient denies fever, chills, nausea, vomiting, lightheadedness, dizziness, shortness of breath and chest pain.     12/30/2020 : Clinic visit with KEV Brantley.  Goran states he is feeling well, denies fevers, chills, nausea, vomiting, cough or shortness of breath.  He states that home health is asked him to remove his compression wrap and shower before they come to see him.  He stated that I agreed that this would be helpful for his skin, and encouraged him to do so.  At first stated he was having trouble with his shower, but then stated he would be able to comply.  He states he finished his antibiotics a few days ago.    1/6/2021: Clinic visit with KEV Luna. Patient states that they are feeling well today.  Patient denies fever, chills, nausea, vomiting, lightheadedness, dizziness, shortness of breath and chest pain.  Patient's left lower extremity covered in a thick layer of Pseudomonas slough.  Patient has new wound to right lower extremity with Pseudomonas as well.  Initiated Dakin's solution to change daily in clinic.    1/13/2021: Clinic visit with KEV Luna. Patient states that they are feeling well today.  Patient denies fever, chills, nausea, vomiting, lightheadedness,  dizziness, shortness of breath and chest pain.  Patient reports that he did not put Dakin's solution on bandages at last dressing change.  Patient's Pseudomonas is worse.  Therefore, we will place the patient on Cipro and ordered an EKG to evaluate the patient's QTC interval.  Patient educated again to continue Dakin's solution as well as complete the course of antibiotics.    1/20/2021: Clinic visit with KEV Luna. Patient states that they are feeling well today.  Patient denies fever, chills, nausea, vomiting, lightheadedness, dizziness, shortness of breath and chest pain.  Patient is on ciprofloxacin for severe amount of Pseudomonas to left lower extremity.  Patient end date for ciprofloxacin is 1/23/2021.  I believe the patient's leg is getting too saturated with Dakin's solution dressings.  Patient's leg is macerated, wet and still has Pseudomonas however slightly improved.  I believe that Goran would benefit from increased dressing changes.  Furthermore, Goran has been instructed to use 5 gallons of warm water with 1-1/2 teaspoons of bleach solution mixed and soak his leg for approximately 5 to 10 minutes on days that his dressing is to be changed.  This way we can reduce the amount of moisture that is constantly on his leg while still having the beneficial effects of bleach on the Pseudomonas.    2/3/2021: Clinic visit with KEV Luna. Patient states that they are feeling well today.  Patient denies fever, chills, nausea, vomiting, lightheadedness, dizziness, shortness of breath and chest pain.         REVIEW OF SYSTEMS:   Review of Systems   Constitutional: Negative for chills and fever.        States he is eating well, appetite normal   Respiratory: Negative for cough, shortness of breath and wheezing.    Cardiovascular: Positive for leg swelling. Negative for chest pain, palpitations and claudication.        Chronic swelling in legs, for several years   Gastrointestinal:  Negative for nausea and vomiting.   Musculoskeletal: Positive for joint pain. Negative for myalgias.        Chronic   Neurological: Negative for dizziness, weakness and headaches.   Psychiatric/Behavioral: Positive for depression.       PHYSICAL EXAMINATION:   /91 Comment: Vital signs taken by Freenom wound care tech  Pulse 69 Comment: Vital signs taken by Rosey B wound care tech  Temp 37 °C (98.6 °F) (Temporal) Comment: Vital signs taken by Freenom wound care tech Comment (Src): Vital signs taken by Rosey B wound care tech  Resp 16 Comment: Vital signs taken by Rosey B wound care tech  SpO2 96% Comment: Vital signs taken by Freenom wound care tech    Physical Exam   Constitutional: He is oriented to person, place, and time and well-developed, well-nourished, and in no distress. No distress.   HENT:   Head: Normocephalic and atraumatic.   Eyes: Pupils are equal, round, and reactive to light. Conjunctivae and EOM are normal.   Neck: Neck supple.   Cardiovascular: Intact distal pulses.   Pulmonary/Chest: Effort normal. No respiratory distress. He has no wheezes.   Musculoskeletal: Normal range of motion.         General: Deformity and edema present.      Comments: Left charcot foot and ankle  1+pitting edema to bilateral LE   Neurological: He is alert and oriented to person, place, and time.   Skin: Skin is warm. He is not diaphoretic.   Large full-thickness wounds to lower extremities  Maceration, pseudomonal growth, moisture  Refer to wound flowsheet and photos     Psychiatric: Mood, affect and judgment normal.                                                         WOUND ASSESSMENT                Wound 06/05/20 Full Thickness Wound Leg Left --Left Lateral LE (Active)   Wound Image    02/03/21 1418   Site Assessment Red;Yellow;White 02/03/21 1418   Periwound Assessment Fragile;Denuded;Edema 02/03/21 1418   Margins Attached edges 02/03/21 1418   Closure Secondary intention 01/20/21 1300    Drainage Amount Copious 02/03/21 1418   Drainage Description Serosanguineous;Yellow 02/03/21 1418   Treatments Cleansed;Topical Lidocaine;Provider debridement 02/03/21 1418   Wound Cleansing Puracyn [place] 02/03/21 1418   Periwound Protectant Barrier Paste;Stoma Powder 02/03/21 1418   Dressing Cleansing/Solutions Not Applicable 02/03/21 1418   Dressing Options Hydrofiber Silver;Absorbent Abdominal Pad;Dry Roll Gauze;Hypafix Tape;Tubigrip 02/03/21 1418   Dressing Changed Changed 01/20/21 1300   Dressing Status Clean;Dry;Intact 01/20/21 1300   Dressing Change/Treatment Frequency Every 48 hrs, and As Needed 01/20/21 1300   Non-staged Wound Description Full thickness 02/03/21 1418   Wound Length (cm) 18 cm 02/03/21 1418   Wound Width (cm) 7 cm 02/03/21 1418   Wound Depth (cm) 0.3 cm 02/03/21 1418   Wound Surface Area (cm^2) 126 cm^2 02/03/21 1418   Wound Volume (cm^3) 37.8 cm^3 02/03/21 1418   Post-Procedure Length (cm) 18 cm 02/03/21 1418   Post-Procedure Width (cm) 9 cm 02/03/21 1418   Post-Procedure Depth (cm) 0.3 cm 02/03/21 1418   Post-Procedure Surface Area (cm^2) 162 cm^2 02/03/21 1418   Post-Procedure Volume (cm^3) 48.6 cm^3 02/03/21 1418   Wound Healing % 31 02/03/21 1418   Wound Bed Granulation (%) 5 % 01/13/21 1345   Wound Bed Epithelium (%) 0 % 01/13/21 1345   Wound Bed Slough (%) 75 % 02/03/21 1418   Wound Bed Eschar (%) 0 % 01/13/21 1345   Wound Bed Granulation (%) - Post-Procedure 100 % 09/30/20 1400   Tunneling (cm) 0 cm 02/03/21 1418   Undermining (cm) 0 cm 02/03/21 1418   Wound Odor Mild 02/03/21 1418   Exposed Structures None 02/03/21 1418       Wound 01/13/21 Full Thickness Wound Left foot (Active)   Wound Image    02/03/21 1418   Site Assessment Pink;Red;Yellow 02/03/21 1418   Periwound Assessment Fragile;Denuded 02/03/21 1418   Margins Attached edges 02/03/21 1418   Drainage Amount Large 02/03/21 1418   Drainage Description Serous;Yellow 02/03/21 1418   Treatments Cleansed;Topical  Lidocaine;Provider debridement 02/03/21 1418   Wound Cleansing Puracyn [place] 02/03/21 1418   Periwound Protectant Barrier Paste;Stoma Powder 02/03/21 1418   Dressing Cleansing/Solutions Not Applicable 02/03/21 1418   Dressing Options Hydrofiber Silver;Absorbent Abdominal Pad;Dry Roll Gauze;Hypafix Tape;Tubigrip 02/03/21 1418   Dressing Changed Changed 01/20/21 1300   Dressing Status Clean;Dry;Intact 01/20/21 1300   Dressing Change/Treatment Frequency Every 48 hrs, and As Needed 01/20/21 1300   Non-staged Wound Description Full thickness 02/03/21 1418   Wound Length (cm) 2 cm 02/03/21 1418   Wound Width (cm) 2.6 cm 02/03/21 1418   Wound Depth (cm) 0.2 cm 02/03/21 1418   Wound Surface Area (cm^2) 5.2 cm^2 02/03/21 1418   Wound Volume (cm^3) 1.04 cm^3 02/03/21 1418   Post-Procedure Length (cm) 3.5 cm 02/03/21 1418   Post-Procedure Width (cm) 3.5 cm 02/03/21 1418   Post-Procedure Depth (cm) 0.2 cm 02/03/21 1418   Post-Procedure Surface Area (cm^2) 12.25 cm^2 02/03/21 1418   Post-Procedure Volume (cm^3) 2.45 cm^3 02/03/21 1418   Wound Healing % -9 02/03/21 1418   Wound Bed Slough (%) 25 % 02/03/21 1418   Tunneling (cm) 0 cm 02/03/21 1418   Undermining (cm) 0 cm 02/03/21 1418   Wound Odor Mild 02/03/21 1418   Exposed Structures None 02/03/21 1418       Wound 01/20/21 Left Lateral ankle (Active)   Wound Image   02/03/21 1418   Site Assessment Red;Yellow 02/03/21 1418   Periwound Assessment Denuded;Fragile;Edema 02/03/21 1418   Margins Epibole (rolled edges);Attached edges 02/03/21 1418   Closure Secondary intention 01/20/21 1300   Drainage Amount Copious 02/03/21 1418   Drainage Description Serous;Yellow 02/03/21 1418   Treatments Cleansed;Topical Lidocaine;Provider debridement 02/03/21 1418   Wound Cleansing Puracyn [place] 02/03/21 1418   Periwound Protectant Barrier Paste;Stoma Powder 02/03/21 1418   Dressing Options Hydrofiber Silver;Absorbent Abdominal Pad;Dry Roll Gauze;Hypafix Tape;Tubigrip 02/03/21 1418   Dressing  Changed Changed 01/20/21 1300   Dressing Status Clean;Dry;Intact 01/20/21 1300   Dressing Change/Treatment Frequency Every 48 hrs, and As Needed 01/20/21 1300   Non-staged Wound Description Full thickness 02/03/21 1418   Wound Length (cm) 4.1 cm 01/20/21 1300   Wound Width (cm) 2.1 cm 01/20/21 1300   Wound Depth (cm) 0.1 cm 01/20/21 1300   Wound Surface Area (cm^2) 8.61 cm^2 01/20/21 1300   Wound Volume (cm^3) 0.86 cm^3 01/20/21 1300   Post-Procedure Length (cm) 9 cm 02/03/21 1418   Post-Procedure Width (cm) 9.5 cm 02/03/21 1418   Post-Procedure Depth (cm) 0.4 cm 02/03/21 1418   Post-Procedure Surface Area (cm^2) 85.5 cm^2 02/03/21 1418   Post-Procedure Volume (cm^3) 34.2 cm^3 02/03/21 1418   Wound Bed Slough (%) 75 % 02/03/21 1418   Tunneling (cm) 0 cm 02/03/21 1418   Undermining (cm) 0 cm 02/03/21 1418   Wound Odor Mild 02/03/21 1418   Exposed Structures None 02/03/21 1418       Wound 01/20/21 LLE Posteromedial  (Active)   Wound Image    02/03/21 1418   Site Assessment Tan;Yellow;Pink 02/03/21 1418   Periwound Assessment Denuded;Fragile;Edema 02/03/21 1418   Margins Epibole (rolled edges);Attached edges 02/03/21 1418   Closure Secondary intention 01/20/21 1300   Drainage Amount Copious 02/03/21 1418   Drainage Description Serosanguineous;Yellow 02/03/21 1418   Treatments Cleansed;Topical Lidocaine;Provider debridement 02/03/21 1418   Wound Cleansing Puracyn [place] 02/03/21 1418   Periwound Protectant Barrier Paste;Stoma Powder 02/03/21 1418   Dressing Options Hydrofiber Silver;Absorbent Abdominal Pad;Dry Roll Gauze;Hypafix Tape;Tubigrip 02/03/21 1418   Dressing Changed Changed 01/20/21 1300   Dressing Status Clean;Dry;Intact 01/20/21 1300   Non-staged Wound Description Full thickness 02/03/21 1418   Wound Length (cm) 17 cm 02/03/21 1418   Wound Width (cm) 12 cm 02/03/21 1418   Wound Depth (cm) 1 cm 02/03/21 1418   Wound Surface Area (cm^2) 204 cm^2 02/03/21 1418   Wound Volume (cm^3) 204 cm^3 02/03/21 1418    Post-Procedure Length (cm) 20 cm 21 1418   Post-Procedure Width (cm) 15 cm 21 1418   Post-Procedure Depth (cm) 1 cm 21 1418   Post-Procedure Surface Area (cm^2) 300 cm^2 21 1418   Post-Procedure Volume (cm^3) 300 cm^3 21 1418   Wound Healing % [id number] 21 1418   Wound Bed Slough (%) 80 % 21 1418   Tunneling (cm) 0 cm 21 1418   Undermining (cm) 0 cm 21 1418   Wound Odor Mild 21 1418   Exposed Structures None 21 141       PROCEDURE: Excisional debridement of left lower extremity wounds   -2% viscous lidocaine applied topically to wound beds for approximately 10 minutes prior to debridement  -Curette used to excise thick layer of slough from wound beds.  Excisional debridement was performed to remove devitalized tissue from all wounds until healthy, bleeding tissue was visualized.  Total area debrided approximately (100% accuracy of wound measurement difficult due to the diffuse nature of the wounds) 200 cm² tissue debrided into the subcutaneous layer of all wounds.    -Bleeding controlled with manual pressure.    -Wounds cleansed with normal saline  -Patient tolerated the procedure well, without c/o pain or discomfort.         Pertinent Labs and Diagnostics:    Labs:     A1c:   Lab Results   Component Value Date/Time    HBA1C 5.7 (H) 2020 11:22 AM      Imagin/3/2020-MRI left tibia-fibula with and without contrast  IMPRESSION:     Moderate lateral leg cellulitis and underlying myositis     No abscess or osteomyelitis    VASCULAR STUDIES: BHUPINDER 2019   Right.    Doppler waveform of the common femoral artery is of high amplitude and    triphasic.    Doppler waveforms at the ankle are brisk and triphasic.    Ankle-brachial index is normal.       Left.    Could not perform ankle-brachial index due to large blisters/ulcers.   Normal toe-brachial index: 0.94   Doppler waveform of the common femoral artery is of high amplitude and    triphasic.     Biphasic waveforms seen at the ankle.     WOUND CULTURE:    11/18/2020: Culture collected in clinic  Culture Result Abnormal   Staphylococcus aureus   Light growth     Culture Result Abnormal   Pseudomonas aeruginosa   Light growth     Culture Result Abnormal   Enterococcus faecalis   Light growth              ASSESSMENT AND PLAN:     1. Skin ulcer of left lower leg with fat layer exposed (HCC)  Comment: Patient has history of recurring ulcerations to left lower extremity.  Has undergone ablation procedures by Dr. White.  Discharged from [facility] in March 2020 due to healing, returned approximately 3 weeks later much worse.  Hospitalized from 4/16 until 5/21/2020 underwent multiple surgical debridements.    02/03/21: Patient reports that he is soaking his leg for approximately 10 minutes prior to each wound dressing change in solution of water and bleach as described above.  However, patient still has significant pseudomonal growth with maceration of surrounding tissue and generalized degradation of wound bed and tissue quality.  -Excisional debridement of wounds in clinic today, medically necessary to promote wound healing.  -Patient to return to clinic weekly for assessment and debridement  -Home health to change dressing 3 times per week in between clinic visits until we can establish a dry periwound environment without Pseudomonas.  Home health reports that they are having difficulty changing the dressing 3 times a week and have only been changing it twice a week, due to scheduling.  -Patient is to soak left leg in the above-mentioned solution for approximately 5 to 10 minutes prior to wound dressing change by home health team.  -Oral Cipro 500 mg twice daily reordered in clinic today.  Patient's  as of 1/13/2021  -If patient does not have improvement with second round of oral antibiotics as well as bleach baths patient will have to be admitted for IV antibiotic therapy and increased wound dressing  changes.    Wound care: Hydrofiber silver, absorbent abdominal pad, dry roll gauze, Hypafix tape, Tubigrip D    2. Chronic venous stasis  Comments: Hemosiderin staining, brawny edema, scarring- findings consistent with CVI.  Pt established at [facility], underwent ablation procedures in 2019.     -Consider referral back to vascular if wounds failed to progress or deteriorate.    3. Acquired deformity of left ankle and foot  Comments: Charcot deformity of foot and ankle.  Very little ROM of ankle.  Patient was referred to orthopedics previously, however did not make appointment.  -Patient will need to follow-up with orthopedics once wounds are healed.    4.  Wound infection     02/03/21:   -Patient to continue bleach solution as described above for 5 to 10 minutes on each day of dressing changes  -Ciprofloxacin 500 mg twice daily prescribed due to severe pseudomonal infection over wound beds    5.  Pain associated with wound    01/20/21:   -2% viscous lidocaine applied topically to wound bed for approximately 5 minutes prior to debridement  -Patient tolerated procedure today with no complaints of discomfort.    >20 reviewing the patient's prior medical records and notes including prior positive lower extremity wound culture, educating the patient on the adverse effects of ciprofloxacin including prolonged QTC, review of prior EKG, educating the patient on signs and symptoms that would warrant him to go directly to the ER including fever, chills, increased pain, and palpitations due to prolongation of QTC.     Please note that this note may have been created using voice recognition software. I have worked with technical experts from [facility] to optimize the interface.  I have made every reasonable attempt to correct obvious errors, but there may be errors of grammar and possibly content that I did not discover before finalizing the note.    N

## 2021-02-10 ENCOUNTER — OFFICE VISIT (OUTPATIENT)
Dept: WOUND CARE | Facility: MEDICAL CENTER | Age: 66
End: 2021-02-10
Attending: NURSE PRACTITIONER
Payer: MEDICARE

## 2021-02-10 VITALS
HEART RATE: 75 BPM | DIASTOLIC BLOOD PRESSURE: 75 MMHG | RESPIRATION RATE: 20 BRPM | OXYGEN SATURATION: 94 % | SYSTOLIC BLOOD PRESSURE: 128 MMHG | TEMPERATURE: 98.1 F

## 2021-02-10 DIAGNOSIS — T14.8XXA WOUND INFECTION: ICD-10-CM

## 2021-02-10 DIAGNOSIS — R52 PAIN ASSOCIATED WITH WOUND: ICD-10-CM

## 2021-02-10 DIAGNOSIS — T14.8XXA PAIN ASSOCIATED WITH WOUND: ICD-10-CM

## 2021-02-10 DIAGNOSIS — L08.9 WOUND INFECTION: ICD-10-CM

## 2021-02-10 DIAGNOSIS — M21.962 ACQUIRED DEFORMITY OF LEFT ANKLE AND FOOT: ICD-10-CM

## 2021-02-10 DIAGNOSIS — L97.922 SKIN ULCER OF LEFT LOWER LEG WITH FAT LAYER EXPOSED (HCC): Primary | ICD-10-CM

## 2021-02-10 DIAGNOSIS — I87.8 CHRONIC VENOUS STASIS: ICD-10-CM

## 2021-02-10 PROCEDURE — 11042 DBRDMT SUBQ TIS 1ST 20SQCM/<: CPT

## 2021-02-10 PROCEDURE — 11045 DBRDMT SUBQ TISS EACH ADDL: CPT

## 2021-02-10 PROCEDURE — 11042 DBRDMT SUBQ TIS 1ST 20SQCM/<: CPT | Performed by: NURSE PRACTITIONER

## 2021-02-10 PROCEDURE — 11045 DBRDMT SUBQ TISS EACH ADDL: CPT | Performed by: NURSE PRACTITIONER

## 2021-02-10 ASSESSMENT — ENCOUNTER SYMPTOMS
FEVER: 0
PALPITATIONS: 0
DIZZINESS: 0
NAUSEA: 0
DEPRESSION: 1
CLAUDICATION: 0
VOMITING: 0
MYALGIAS: 0
SHORTNESS OF BREATH: 0
WHEEZING: 0
CHILLS: 0
COUGH: 0
WEAKNESS: 0
HEADACHES: 0

## 2021-02-10 ASSESSMENT — PAIN SCALES - GENERAL: PAINLEVEL: 5=MODERATE PAIN

## 2021-02-10 NOTE — PROGRESS NOTES
Provider Encounter- Full Thickness wound    HISTORY OF PRESENT ILLNESS  Wound History:    START OF CARE IN CLINIC: 5/20/2020               REFERRING PROVIDER: KEV Keith                 WOUND ETIOLOGY: venous,  likely mixed etiology              LOCATION: Left lower leg, multiple wounds                                   Right lower leg, multiple wounds-first observed in clinic on initial evaluation, 6/5/2020.  Resolved 9/2/2020                HISTORY: Patient is very well-known to this clinic from previous treatment of wounds to his left lower extremity.  He was discharged from the clinic in March 2020 due to full resolution of his wound.  He returned to the clinic approximately 3 weeks later, on 4/16, with cellulitis, edema, and large open wounds to his left lower leg, and was sent directly to [facility] emergency room.  He was admitted for IV antibiotics and surgical intervention.  On 4/22/2020, he underwent an I&D of his left lower extremity, fasciotomy, and VAC placement.  In the following weeks he underwent surgery 3 more times for irrigation and debridement.  He was discharged home on 5/21, on the VAC, with home health and a referral to [facility].       Pertinent Medical History:  Anxiety, Charcot's joint of left foot, non-diabetic (3/21/2016), Chronic congestive heart failure (HCC) (11/16/2017), Hepatitis C, chronic (HCC), Hypertension, Migraine, Polysubstance abuse (HCC) (3/8/2018), Tobacco use (4/18/2016), Ulcer of left lower extremity with necrosis of muscle (HCC) (3/21/2016), and Venous stasis ulcer ([facility]) (2017).                ETIOLOGY HISTORY:  Vascular Surgeon: Dr. White. Compression Circ-aid. Varicose Veins none visible     TOBACCO USE: Patient denies; patient also denies alcohol and recreational drug use    Patient's problem list, allergies, and current medications reviewed and updated in Epic    Interval History:  Interval History thinned 2/10/2021.  Please see previous notes for  complete interval history.       2/10/2021: Clinic visit with KEV Luna. Patient states that they are feeling well today.  Patient denies fever, chills, nausea, vomiting, lightheadedness, dizziness, shortness of breath and chest pain.  Patient with significant decrease in Pseudomonas to left lower extremity.  Patient is to finish his course of ciprofloxacin on 2/13/2021.  Patient is to continue with bleach soaks prior to wound dressing changes.  Continue with 3 times a week dressing changes.  Wound looks significantly better.        REVIEW OF SYSTEMS:   Review of Systems   Constitutional: Negative for chills and fever.        States he is eating well, appetite normal   Respiratory: Negative for cough, shortness of breath and wheezing.    Cardiovascular: Positive for leg swelling. Negative for chest pain, palpitations and claudication.        Chronic swelling in legs, for several years   Gastrointestinal: Negative for nausea and vomiting.   Musculoskeletal: Positive for joint pain. Negative for myalgias.        Chronic   Neurological: Negative for dizziness, weakness and headaches.   Psychiatric/Behavioral: Positive for depression.       PHYSICAL EXAMINATION:   /75   Pulse 75   Temp 36.7 °C (98.1 °F)   Resp 20   SpO2 94%     Physical Exam   Constitutional: He is oriented to person, place, and time and well-developed, well-nourished, and in no distress. No distress.   HENT:   Head: Normocephalic and atraumatic.   Eyes: Pupils are equal, round, and reactive to light. Conjunctivae and EOM are normal.   Cardiovascular: Intact distal pulses.   Pulmonary/Chest: Effort normal. No respiratory distress. He has no wheezes.   Musculoskeletal:         General: Deformity and edema present. Normal range of motion.      Cervical back: Neck supple.      Comments: Left charcot foot and ankle  1+pitting edema to bilateral LE   Neurological: He is alert and oriented to person, place, and time.   Skin: Skin is  warm. He is not diaphoretic.   Large full-thickness wounds to lower extremities  Significantl decrease in Pseudomonas to left lower extremity can differentiate wound bed from viable tissue.  Refer to wound flowsheet and photos     Psychiatric: Mood, affect and judgment normal.                                                         WOUND ASSESSMENT            Wound 06/05/20 Full Thickness Wound Leg Left --Left Lateral LE (Active)   Wound Image    02/10/21 1300   Site Assessment Red;Yellow 02/10/21 1300   Periwound Assessment Edema;Denuded 02/10/21 1300   Margins Attached edges 02/10/21 1300   Closure Secondary intention 01/20/21 1300   Drainage Amount Copious 02/10/21 1300   Drainage Description Serosanguineous 02/10/21 1300   Treatments Cleansed;Provider debridement 02/10/21 1300   Wound Cleansing Puracyn Clifton 02/10/21 1300   Periwound Protectant Barrier Paste 02/10/21 1300   Dressing Cleansing/Solutions Not Applicable 02/10/21 1300   Dressing Options Hydrofiber Silver;Absorbent Abdominal Pad;Dry Roll Gauze;Hypafix Tape;Tubigrip 02/10/21 1300   Dressing Changed Changed 02/10/21 1300   Dressing Status Clean;Dry;Intact 01/20/21 1300   Dressing Change/Treatment Frequency Every 48 hrs, and As Needed 01/20/21 1300   Non-staged Wound Description Full thickness 02/10/21 1300   Wound Length (cm) 18.5 cm 02/10/21 1300   Wound Width (cm) 6 cm 02/10/21 1300   Wound Depth (cm) 0.2 cm 02/10/21 1300   Wound Surface Area (cm^2) 111 cm^2 02/10/21 1300   Wound Volume (cm^3) 22.2 cm^3 02/10/21 1300   Post-Procedure Length (cm) 18.6 cm 02/10/21 1300   Post-Procedure Width (cm) 6.4 cm 02/10/21 1300   Post-Procedure Depth (cm) 0.2 cm 02/10/21 1300   Post-Procedure Surface Area (cm^2) 119.04 cm^2 02/10/21 1300   Post-Procedure Volume (cm^3) 23.81 cm^3 02/10/21 1300   Wound Healing % 59 02/10/21 1300   Wound Bed Granulation (%) 10 % 02/10/21 1300   Wound Bed Epithelium (%) 90 % 02/10/21 1300   Wound Bed Slough (%) 0 % 02/10/21 1300    Wound Bed Eschar (%) 0 % 01/13/21 1345   Wound Bed Granulation (%) - Post-Procedure 100 % 09/30/20 1400   Tunneling (cm) 0 cm 02/10/21 1300   Undermining (cm) 0 cm 02/10/21 1300   Wound Odor None 02/10/21 1300   Exposed Structures None 02/10/21 1300       Wound 01/20/21 -Left Medial/Posterior/Lateral LE (Active)   Wound Image      02/10/21 1300   Site Assessment [place];Yellow;[place] 02/10/21 1300   Periwound Assessment Denuded;Edema;Fragile 02/10/21 1300   Margins Undefined edges 02/10/21 1300   Closure Secondary intention 01/20/21 1300   Drainage Amount Copious 02/10/21 1300   Drainage Description Serosanguineous 02/10/21 1300   Treatments Cleansed;Provider debridement 02/10/21 1300   Wound Cleansing Puracyn Keota 02/10/21 1300   Periwound Protectant Barrier Paste 02/10/21 1300   Dressing Options Hydrofiber Silver;Absorbent Abdominal Pad;Dry Roll Gauze;Hypafix Tape;Tubigrip 02/10/21 1300   Dressing Changed Changed 02/10/21 1300   Dressing Status Clean;Dry;Intact 01/20/21 1300   Non-staged Wound Description Full thickness 02/10/21 1300   Wound Length (cm) 17.4 cm 02/10/21 1300   Wound Width (cm) 14 cm 02/10/21 1300   Wound Depth (cm) 0.5 cm 02/10/21 1300   Wound Surface Area (cm^2) 243.6 cm^2 02/10/21 1300   Wound Volume (cm^3) 121.8 cm^3 02/10/21 1300   Post-Procedure Length (cm) 17.5 cm 02/10/21 1300   Post-Procedure Width (cm) 15 cm 02/10/21 1300   Post-Procedure Depth (cm) 0.5 cm 02/10/21 1300   Post-Procedure Surface Area (cm^2) 262.5 cm^2 02/10/21 1300   Post-Procedure Volume (cm^3) 131.25 cm^3 02/10/21 1300   Wound Healing % -[id number] 02/10/21 1300   Wound Bed Granulation (%) 30 % 02/10/21 1300   Wound Bed Epithelium (%) 0 % 02/10/21 1300   Wound Bed Slough (%) 70 % 02/10/21 1300   Wound Bed Eschar (%) 0 % 02/10/21 1300   Tunneling (cm) 0 cm 02/10/21 1300   Undermining (cm) 0 cm 02/10/21 1300   Wound Odor None 02/10/21 1300   Exposed Structures None 02/10/21 1300          PROCEDURE: Excisional debridement of left  lower extremity wounds   -2% viscous lidocaine applied topically to wound beds for approximately 10 minutes prior to debridement  -Curette used to excise thick layer of slough from wound beds.  Excisional debridement was performed to remove devitalized tissue from all wounds until healthy, bleeding tissue was visualized.  Total area debrided approximately (100% accuracy of wound measurement difficult due to the diffuse nature of the wounds) 200 cm² tissue debrided into the subcutaneous layer of all wounds.    -Bleeding controlled with manual pressure.    -Wounds cleansed with normal saline  -Patient tolerated the procedure well, without c/o pain or discomfort.         Pertinent Labs and Diagnostics:    Labs:     A1c:   Lab Results   Component Value Date/Time    HBA1C 5.7 (H) 2020 11:22 AM      Imagin/3/2020-MRI left tibia-fibula with and without contrast  IMPRESSION:     Moderate lateral leg cellulitis and underlying myositis     No abscess or osteomyelitis    VASCULAR STUDIES: BHUPINDER 2019   Right.    Doppler waveform of the common femoral artery is of high amplitude and    triphasic.    Doppler waveforms at the ankle are brisk and triphasic.    Ankle-brachial index is normal.       Left.    Could not perform ankle-brachial index due to large blisters/ulcers.   Normal toe-brachial index: 0.94   Doppler waveform of the common femoral artery is of high amplitude and    triphasic.    Biphasic waveforms seen at the ankle.     WOUND CULTURE:    2020: Culture collected in clinic  Culture Result Abnormal   Staphylococcus aureus   Light growth     Culture Result Abnormal   Pseudomonas aeruginosa   Light growth     Culture Result Abnormal   Enterococcus faecalis   Light growth              ASSESSMENT AND PLAN:     1. Skin ulcer of left lower leg with fat layer exposed (HCC)  Comment: Patient has history of recurring ulcerations to left lower extremity.  Has undergone ablation procedures by Dr. White.   Discharged from [facility] in March 2020 due to healing, returned approximately 3 weeks later much worse.  Hospitalized from 4/16 until 5/21/2020 underwent multiple surgical debridements.    02/10/21: Patient reports that he is soaking his leg for approximately 10 minutes prior to each wound dressing change in solution of water and bleach as described above.  With use of oral ciprofloxacin and bleach soak the amount of Pseudomonas to the patient's left lower extremity significantly decreased.    -Excisional debridement of wounds in clinic today, medically necessary to promote wound healing.  -Patient to return to clinic weekly for assessment and debridement  -Home health to change dressing 3 times per week in between clinic visits until we can establish a dry periwound environment without Pseudomonas.   -Patient is to soak left leg in the above-mentioned solution for approximately 5 to 10 minutes prior to wound dressing change by home health team.  -Oral Cipro 500 mg .  Completion date 2/13/2021      Wound care: Hydrofiber silver, absorbent abdominal pad, dry roll gauze, Hypafix tape, Tubigrip D    2. Chronic venous stasis  Comments: Hemosiderin staining, brawny edema, scarring- findings consistent with CVI.  Pt established at [facility], underwent ablation procedures in 2019.     -Consider referral back to vascular if wounds failed to progress or deteriorate.    3. Acquired deformity of left ankle and foot  Comments: Charcot deformity of foot and ankle.  Very little ROM of ankle.  Patient was referred to orthopedics previously, however did not make appointment.  -Patient will need to follow-up with orthopedics once wounds are healed.    4.  Wound infection     02/10/21:   -Patient to continue bleach solution as described above for 5 to 10 minutes on each day of dressing changes  -Ciprofloxacin 500 mg twice daily prescribed due to severe pseudomonal infection over wound beds    5.  Pain associated with  wound    02/10/21:  -2% viscous lidocaine applied topically to wound bed for approximately 5 minutes prior to debridement  -Patient tolerated procedure today with no complaints of discomfort.      Please note that this note may have been created using voice recognition software. I have worked with technical experts from [facility] to optimize the interface.  I have made every reasonable attempt to correct obvious errors, but there may be errors of grammar and possibly content that I did not discover before finalizing the note.    N

## 2021-02-10 NOTE — PATIENT INSTRUCTIONS
-Keep dressings clean and dry. Change dressings if they become over saturated, soiled or fall off.     -Avoid prolonged standing or sitting without elevating your legs.    -Remove your compression garments if you have severe pain, severe swelling, numbness, color change, or temperature change in your toes. If you need to remove your compression garments, do so by unrolling them. Do not cut the compression garments off, this is to prevent cutting yourself on accident.    -Should you experience any significant changes in your wound(s), such as infection (redness, swelling, localized heat, increased pain, fever > 101 F, chills) or have any questions regarding your home care instructions, please contact the wound center at [phone]. If after hours, contact your primary care physician or go to the hospital emergency room.

## 2021-02-17 ENCOUNTER — OFFICE VISIT (OUTPATIENT)
Dept: WOUND CARE | Facility: MEDICAL CENTER | Age: 66
End: 2021-02-17
Attending: NURSE PRACTITIONER
Payer: MEDICARE

## 2021-02-17 VITALS
DIASTOLIC BLOOD PRESSURE: 78 MMHG | RESPIRATION RATE: 20 BRPM | TEMPERATURE: 97.9 F | OXYGEN SATURATION: 97 % | SYSTOLIC BLOOD PRESSURE: 127 MMHG | HEART RATE: 74 BPM

## 2021-02-17 DIAGNOSIS — I87.8 CHRONIC VENOUS STASIS: ICD-10-CM

## 2021-02-17 DIAGNOSIS — L08.9 WOUND INFECTION: ICD-10-CM

## 2021-02-17 DIAGNOSIS — L97.922 SKIN ULCER OF LEFT LOWER LEG WITH FAT LAYER EXPOSED (HCC): Primary | ICD-10-CM

## 2021-02-17 DIAGNOSIS — T14.8XXA WOUND INFECTION: ICD-10-CM

## 2021-02-17 DIAGNOSIS — R52 PAIN ASSOCIATED WITH WOUND: ICD-10-CM

## 2021-02-17 DIAGNOSIS — T14.8XXA PAIN ASSOCIATED WITH WOUND: ICD-10-CM

## 2021-02-17 DIAGNOSIS — M21.962 ACQUIRED DEFORMITY OF LEFT ANKLE AND FOOT: ICD-10-CM

## 2021-02-17 PROCEDURE — 11045 DBRDMT SUBQ TISS EACH ADDL: CPT

## 2021-02-17 PROCEDURE — 11042 DBRDMT SUBQ TIS 1ST 20SQCM/<: CPT

## 2021-02-17 PROCEDURE — 11045 DBRDMT SUBQ TISS EACH ADDL: CPT | Performed by: NURSE PRACTITIONER

## 2021-02-17 PROCEDURE — 11042 DBRDMT SUBQ TIS 1ST 20SQCM/<: CPT | Performed by: NURSE PRACTITIONER

## 2021-02-17 PROCEDURE — 99213 OFFICE O/P EST LOW 20 MIN: CPT

## 2021-02-17 ASSESSMENT — ENCOUNTER SYMPTOMS
COUGH: 0
SHORTNESS OF BREATH: 0
NAUSEA: 0
CLAUDICATION: 0
MYALGIAS: 0
CHILLS: 0
DEPRESSION: 1
VOMITING: 0
FEVER: 0
PALPITATIONS: 0
WHEEZING: 0
HEADACHES: 0
DIZZINESS: 0
WEAKNESS: 0

## 2021-02-17 ASSESSMENT — PAIN SCALES - GENERAL: PAINLEVEL: 5=MODERATE PAIN

## 2021-02-17 NOTE — PATIENT INSTRUCTIONS
-Keep your wound dressing clean, dry, and intact.    -Change your dressing if it becomes soiled, soaked, or falls off.    -Should you experience any significant changes in your wound(s), such as infection (redness, swelling, localized heat, increased pain, fever > 101 F, chills) or have any questions regarding your home care instructions, please contact the wound center at [phone]. If after hours, contact your primary care physician or go to the hospital emergency room.

## 2021-02-17 NOTE — PROGRESS NOTES
Provider Encounter- Full Thickness wound    HISTORY OF PRESENT ILLNESS  Wound History:    START OF CARE IN CLINIC: 5/20/2020               REFERRING PROVIDER: KEV Keith                 WOUND ETIOLOGY: venous,  likely mixed etiology              LOCATION: Left lower leg, multiple wounds                                   Right lower leg, multiple wounds-first observed in clinic on initial evaluation, 6/5/2020.  Resolved 9/2/2020                HISTORY: Patient is very well-known to this clinic from previous treatment of wounds to his left lower extremity.  He was discharged from the clinic in March 2020 due to full resolution of his wound.  He returned to the clinic approximately 3 weeks later, on 4/16, with cellulitis, edema, and large open wounds to his left lower leg, and was sent directly to [facility] emergency room.  He was admitted for IV antibiotics and surgical intervention.  On 4/22/2020, he underwent an I&D of his left lower extremity, fasciotomy, and VAC placement.  In the following weeks he underwent surgery 3 more times for irrigation and debridement.  He was discharged home on 5/21, on the VAC, with home health and a referral to [facility].       Pertinent Medical History:  Anxiety, Charcot's joint of left foot, non-diabetic (3/21/2016), Chronic congestive heart failure (HCC) (11/16/2017), Hepatitis C, chronic (HCC), Hypertension, Migraine, Polysubstance abuse (HCC) (3/8/2018), Tobacco use (4/18/2016), Ulcer of left lower extremity with necrosis of muscle (HCC) (3/21/2016), and Venous stasis ulcer ([facility]) (2017).                ETIOLOGY HISTORY:  Vascular Surgeon: Dr. White. Compression Circ-aid. Varicose Veins none visible     TOBACCO USE: Patient denies; patient also denies alcohol and recreational drug use    Patient's problem list, allergies, and current medications reviewed and updated in Epic    Interval History:  Interval History thinned 2/10/2021.  Please see previous notes for  complete interval history.       2/10/2021: Clinic visit with KEV Luna. Patient states that they are feeling well today.  Patient denies fever, chills, nausea, vomiting, lightheadedness, dizziness, shortness of breath and chest pain.  Patient with significant decrease in Pseudomonas to left lower extremity.  Patient is to finish his course of ciprofloxacin on 2/13/2021.  Patient is to continue with bleach soaks prior to wound dressing changes.  Continue with 3 times a week dressing changes.  Wound looks significantly better.    2/17/2021: Clinic visit with KEV Luna. Patient states that they are feeling well today.  Patient denies fever, chills, nausea, vomiting, lightheadedness, dizziness, shortness of breath and chest pain.  Wound bed and periwound area significantly improved we will continue with bleach soaks prior to wound dressing changes.  Pseudomonas has significantly declined.  Continue to monitor patient has finished ciprofloxacin.  Patient did have a significant buildup of slough to wound beds only.        REVIEW OF SYSTEMS:   Review of Systems   Constitutional: Negative for chills and fever.        States he is eating well, appetite normal   Respiratory: Negative for cough, shortness of breath and wheezing.    Cardiovascular: Positive for leg swelling. Negative for chest pain, palpitations and claudication.        Chronic swelling in legs, for several years   Gastrointestinal: Negative for nausea and vomiting.   Musculoskeletal: Positive for joint pain. Negative for myalgias.        Chronic   Neurological: Negative for dizziness, weakness and headaches.   Psychiatric/Behavioral: Positive for depression.       PHYSICAL EXAMINATION:   /78   Pulse 74   Temp 36.6 °C (97.9 °F)   Resp 20   SpO2 97%     Physical Exam   Constitutional: He is oriented to person, place, and time and well-developed, well-nourished, and in no distress. No distress.   HENT:   Head: Normocephalic  and atraumatic.   Eyes: Pupils are equal, round, and reactive to light. Conjunctivae and EOM are normal.   Cardiovascular: Intact distal pulses.   Pulmonary/Chest: Effort normal. No respiratory distress. He has no wheezes.   Musculoskeletal:         General: Deformity and edema present. Normal range of motion.      Cervical back: Neck supple.      Comments: Left charcot foot and ankle  1+pitting edema to bilateral LE   Neurological: He is alert and oriented to person, place, and time.   Skin: Skin is warm. He is not diaphoretic.   Large full-thickness wounds to lower extremities  Significant decrease in Pseudomonas to left lower extremity, this continues to improve with bleach baths to left lower extremity.  I can differentiate wound bed from viable tissue.  Refer to wound flowsheet and photos     Psychiatric: Mood, affect and judgment normal.                                                         WOUND ASSESSMENT             Wound 06/05/20 Full Thickness Wound Leg Left --Left Lateral LE (Active)   Wound Image    02/17/21 1300   Site Assessment Red;Yellow 02/17/21 1300   Periwound Assessment Edema;Denuded 02/17/21 1300   Margins Attached edges 02/17/21 1300   Closure Secondary intention 01/20/21 1300   Drainage Amount Large 02/17/21 1300   Drainage Description Serosanguineous 02/17/21 1300   Treatments Cleansed;Topical Lidocaine;Provider debridement 02/17/21 1300   Wound Cleansing Puracyn [place] 02/17/21 1300   Periwound Protectant Barrier Paste;TAC Ointment 02/17/21 1300   Dressing Cleansing/Solutions Not Applicable 02/10/21 1300   Dressing Options Hydrofiber Silver;Absorbent Abdominal Pad;Dry Roll Gauze;Hypafix Tape;Tubigrip 02/17/21 1300   Dressing Changed Changed 02/17/21 1300   Dressing Status Clean;Dry;Intact 01/20/21 1300   Dressing Change/Treatment Frequency Every 48 hrs, and As Needed 01/20/21 1300   Non-staged Wound Description Full thickness 02/17/21 1300   Wound Length (cm) 17 cm 02/17/21 1300   Wound  Width (cm) 5.2 cm 02/17/21 1300   Wound Depth (cm) 0.1 cm 02/17/21 1300   Wound Surface Area (cm^2) 88.4 cm^2 02/17/21 1300   Wound Volume (cm^3) 8.84 cm^3 02/17/21 1300   Post-Procedure Length (cm) 17.8 cm 02/17/21 1300   Post-Procedure Width (cm) 5.2 cm 02/17/21 1300   Post-Procedure Depth (cm) 0.1 cm 02/17/21 1300   Post-Procedure Surface Area (cm^2) 92.56 cm^2 02/17/21 1300   Post-Procedure Volume (cm^3) 9.26 cm^3 02/17/21 1300   Wound Healing % 84 02/17/21 1300   Wound Bed Granulation (%) 10 % 02/10/21 1300   Wound Bed Epithelium (%) 90 % 02/10/21 1300   Wound Bed Slough (%) 0 % 02/10/21 1300   Wound Bed Eschar (%) 0 % 01/13/21 1345   Wound Bed Granulation (%) - Post-Procedure 100 % 09/30/20 1400   Tunneling (cm) 0 cm 02/17/21 1300   Undermining (cm) 0 cm 02/17/21 1300   Wound Odor None 02/17/21 1300   Exposed Structures None 02/17/21 1300       Wound 01/20/21 -Left Medial/Posterior/Lateral LE (Active)   Wound Image    02/17/21 1300   Site Assessment Yellow;[place] 02/17/21 1300   Periwound Assessment Denuded;Edema;Fragile 02/17/21 1300   Margins Undefined edges 02/17/21 1300   Closure Secondary intention 01/20/21 1300   Drainage Amount Large 02/17/21 1300   Drainage Description Serosanguineous 02/17/21 1300   Treatments Cleansed;Topical Lidocaine;Provider debridement 02/17/21 1300   Wound Cleansing Puracyn [place] 02/17/21 1300   Periwound Protectant Barrier Paste;TAC Ointment 02/17/21 1300   Dressing Options Hydrofiber Silver;Absorbent Abdominal Pad;Dry Roll Gauze;Hypafix Tape;Tubigrip 02/17/21 1300   Dressing Changed Changed 02/17/21 1300   Dressing Status Clean;Dry;Intact 01/20/21 1300   Non-staged Wound Description Full thickness 02/17/21 1300   Wound Length (cm) 16.5 cm 02/17/21 1300   Wound Width (cm) 11.2 cm 02/17/21 1300   Wound Depth (cm) 0.3 cm 02/17/21 1300   Wound Surface Area (cm^2) 184.8 cm^2 02/17/21 1300   Wound Volume (cm^3) 55.44 cm^3 02/17/21 1300   Post-Procedure Length (cm) 16.9 cm 02/17/21  1300   Post-Procedure Width (cm) 10.8 cm 21 1300   Post-Procedure Depth (cm) 0.4 cm 21 1300   Post-Procedure Surface Area (cm^2) 182.52 cm^2 21 1300   Post-Procedure Volume (cm^3) 73.01 cm^3 21 1300   Wound Healing % -4721 21 1300   Wound Bed Granulation (%) 30 % 02/10/21 1300   Wound Bed Epithelium (%) 0 % 02/10/21 1300   Wound Bed Slough (%) 70 % 02/10/21 1300   Wound Bed Eschar (%) 0 % 02/10/21 1300   Tunneling (cm) 0 cm 21 1300   Undermining (cm) 0 cm 21 1300   Wound Odor None 21 1300   Exposed Structures None 21 1300                    PROCEDURE: Excisional debridement of left lower extremity wounds   -2% viscous lidocaine applied topically to wound beds for approximately 10 minutes prior to debridement  -Curette used to excise thick layer of slough from wound beds.  Excisional debridement was performed to remove devitalized tissue from all wounds until healthy, bleeding tissue was visualized.  Total area debrided approximately (100% accuracy of wound measurement difficult due to the diffuse nature of the wounds) 200 cm² tissue debrided into the subcutaneous layer of all wounds.    -Bleeding controlled with manual pressure.    -Wounds cleansed with normal saline  -Patient tolerated the procedure well, without c/o pain or discomfort.         Pertinent Labs and Diagnostics:    Labs:     A1c:   Lab Results   Component Value Date/Time    HBA1C 5.7 (H) 2020 11:22 AM      Imagin/3/2020-MRI left tibia-fibula with and without contrast  IMPRESSION:     Moderate lateral leg cellulitis and underlying myositis     No abscess or osteomyelitis    VASCULAR STUDIES: BHUPINDER 2019   Right.    Doppler waveform of the common femoral artery is of high amplitude and    triphasic.    Doppler waveforms at the ankle are brisk and triphasic.    Ankle-brachial index is normal.       Left.    Could not perform ankle-brachial index due to large blisters/ulcers.   Normal  toe-brachial index: 0.94   Doppler waveform of the common femoral artery is of high amplitude and    triphasic.    Biphasic waveforms seen at the ankle.     WOUND CULTURE:    11/18/2020: Culture collected in clinic  Culture Result Abnormal   Staphylococcus aureus   Light growth     Culture Result Abnormal   Pseudomonas aeruginosa   Light growth     Culture Result Abnormal   Enterococcus faecalis   Light growth              ASSESSMENT AND PLAN:     1. Skin ulcer of left lower leg with fat layer exposed (HCC)  Comment: Patient has history of recurring ulcerations to left lower extremity.  Has undergone ablation procedures by Dr. White.  Discharged from [facility] in March 2020 due to healing, returned approximately 3 weeks later much worse.  Hospitalized from 4/16 until 5/21/2020 underwent multiple surgical debridements.    02/17/21: Patient reports that he is soaking his leg for approximately 10 minutes prior to each wound dressing change in solution of water and bleach as described above.  Pseudomonas has significantly decreased with bleach bath.  Patient periwound area has dried out.  Patient has completed Cipro.    -Excisional debridement of wounds in clinic today, medically necessary to promote wound healing.  -Patient to return to clinic weekly for assessment and debridement  -Home health to change dressing 3 times per week in between clinic visits until we can establish a dry periwound environment without Pseudomonas.   -Patient is to continue to soak his left leg in a bleach bath for approximately 5 to 10 minutes prior to wound dressing changes.        Wound care: Hydrofiber silver, absorbent abdominal pad, dry roll gauze, Hypafix tape, Tubigrip D    2. Chronic venous stasis  Comments: Hemosiderin staining, brawny edema, scarring- findings consistent with CVI.  Pt established at [facility], underwent ablation procedures in 2019.     -Consider referral back to vascular if wounds failed to progress or  deteriorate.    3. Acquired deformity of left ankle and foot  Comments: Charcot deformity of foot and ankle.  Very little ROM of ankle.  Patient was referred to orthopedics previously, however did not make appointment.  -Patient will need to follow-up with orthopedics once wounds are healed.    4.  Wound infection     02/17/21:   -Patient to continue bleach solution as described above for 5 to 10 minutes on each day of dressing changes  -Patient has completed a course of ciprofloxacin.  Patient does have significant slough to wound beds however, the periwound area significantly improved.    5.  Pain associated with wound    02/10/21:  -2% viscous lidocaine applied topically to wound bed for approximately 5 minutes prior to debridement  -Patient tolerated procedure today with no complaints of discomfort.      Please note that this note may have been created using voice recognition software. I have worked with technical experts from [facility] [employer] to optimize the interface.  I have made every reasonable attempt to correct obvious errors, but there may be errors of grammar and possibly content that I did not discover before finalizing the note.    N

## 2021-02-24 ENCOUNTER — OFFICE VISIT (OUTPATIENT)
Dept: WOUND CARE | Facility: MEDICAL CENTER | Age: 66
End: 2021-02-24
Attending: NURSE PRACTITIONER
Payer: MEDICARE

## 2021-02-24 VITALS
RESPIRATION RATE: 16 BRPM | OXYGEN SATURATION: 98 % | DIASTOLIC BLOOD PRESSURE: 88 MMHG | SYSTOLIC BLOOD PRESSURE: 139 MMHG | TEMPERATURE: 98.9 F | HEART RATE: 72 BPM

## 2021-02-24 DIAGNOSIS — M21.962 ACQUIRED DEFORMITY OF LEFT ANKLE AND FOOT: ICD-10-CM

## 2021-02-24 DIAGNOSIS — T14.8XXA PAIN ASSOCIATED WITH WOUND: ICD-10-CM

## 2021-02-24 DIAGNOSIS — L08.9 WOUND INFECTION: ICD-10-CM

## 2021-02-24 DIAGNOSIS — R52 PAIN ASSOCIATED WITH WOUND: ICD-10-CM

## 2021-02-24 DIAGNOSIS — I87.8 CHRONIC VENOUS STASIS: ICD-10-CM

## 2021-02-24 DIAGNOSIS — L97.922 SKIN ULCER OF LEFT LOWER LEG WITH FAT LAYER EXPOSED (HCC): Primary | ICD-10-CM

## 2021-02-24 DIAGNOSIS — T14.8XXA WOUND INFECTION: ICD-10-CM

## 2021-02-24 PROCEDURE — 11042 DBRDMT SUBQ TIS 1ST 20SQCM/<: CPT

## 2021-02-24 PROCEDURE — 11045 DBRDMT SUBQ TISS EACH ADDL: CPT | Performed by: NURSE PRACTITIONER

## 2021-02-24 PROCEDURE — 11042 DBRDMT SUBQ TIS 1ST 20SQCM/<: CPT | Performed by: NURSE PRACTITIONER

## 2021-02-24 PROCEDURE — 11045 DBRDMT SUBQ TISS EACH ADDL: CPT

## 2021-02-24 ASSESSMENT — ENCOUNTER SYMPTOMS
HEADACHES: 0
FEVER: 0
WEAKNESS: 0
NAUSEA: 0
CLAUDICATION: 0
COUGH: 0
PALPITATIONS: 0
VOMITING: 0
WHEEZING: 0
DIZZINESS: 0
CHILLS: 0
MYALGIAS: 0
SHORTNESS OF BREATH: 0
DEPRESSION: 1

## 2021-02-24 ASSESSMENT — PAIN SCALES - GENERAL: PAINLEVEL: 4=SLIGHT-MODERATE PAIN

## 2021-02-24 NOTE — PATIENT INSTRUCTIONS
-Keep your wound dressing clean, dry, and intact.    -Change your dressing twice a week with home health or if it becomes soiled, soaked, or falls off.    -Should you experience any significant changes in your wound(s), such as infection (redness, swelling, localized heat, increased pain, fever > 101 F, chills) or have any questions regarding your home care instructions, please contact the wound center at [phone]. If after hours, contact your primary care physician or go to the hospital emergency room.

## 2021-02-25 NOTE — PROGRESS NOTES
Provider Encounter- Full Thickness wound    HISTORY OF PRESENT ILLNESS  Wound History:    START OF CARE IN CLINIC: 5/20/2020               REFERRING PROVIDER: KEV Keith                 WOUND ETIOLOGY: venous,  likely mixed etiology              LOCATION: Left lower leg, multiple wounds                                   Right lower leg, multiple wounds-first observed in clinic on initial evaluation, 6/5/2020.  Resolved 9/2/2020                HISTORY: Patient is very well-known to this clinic from previous treatment of wounds to his left lower extremity.  He was discharged from the clinic in March 2020 due to full resolution of his wound.  He returned to the clinic approximately 3 weeks later, on 4/16, with cellulitis, edema, and large open wounds to his left lower leg, and was sent directly to [facility] emergency room.  He was admitted for IV antibiotics and surgical intervention.  On 4/22/2020, he underwent an I&D of his left lower extremity, fasciotomy, and VAC placement.  In the following weeks he underwent surgery 3 more times for irrigation and debridement.  He was discharged home on 5/21, on the VAC, with home health and a referral to [facility].       Pertinent Medical History:  Anxiety, Charcot's joint of left foot, non-diabetic (3/21/2016), Chronic congestive heart failure (HCC) (11/16/2017), Hepatitis C, chronic (HCC), Hypertension, Migraine, Polysubstance abuse (HCC) (3/8/2018), Tobacco use (4/18/2016), Ulcer of left lower extremity with necrosis of muscle (HCC) (3/21/2016), and Venous stasis ulcer ([facility]) (2017).                ETIOLOGY HISTORY:  Vascular Surgeon: Dr. White. Compression Circ-aid. Varicose Veins none visible     TOBACCO USE: Patient denies; patient also denies alcohol and recreational drug use    Patient's problem list, allergies, and current medications reviewed and updated in Epic    Interval History:  Interval History thinned 2/10/2021.  Please see previous notes for  complete interval history.       2/10/2021: Clinic visit with KEV Luna. Patient states that they are feeling well today.  Patient denies fever, chills, nausea, vomiting, lightheadedness, dizziness, shortness of breath and chest pain.  Patient with significant decrease in Pseudomonas to left lower extremity.  Patient is to finish his course of ciprofloxacin on 2/13/2021.  Patient is to continue with bleach soaks prior to wound dressing changes.  Continue with 3 times a week dressing changes.  Wound looks significantly better.    2/17/2021: Clinic visit with KEV Luna. Patient states that they are feeling well today.  Patient denies fever, chills, nausea, vomiting, lightheadedness, dizziness, shortness of breath and chest pain.  Wound bed and periwound area significantly improved we will continue with bleach soaks prior to wound dressing changes.  Pseudomonas has significantly declined.  Continue to monitor patient has finished ciprofloxacin.  Patient did have a significant buildup of slough to wound beds only.    2/24/2021: Clinic visit with KEV Luna. Patient states that they are feeling well today.  Patient denies fever, chills, nausea, vomiting, lightheadedness, dizziness, shortness of breath and chest pain. Patient reports that he is soaking his leg in a bleach water bath two times a week prior to wound care dressing changes.  Patient has had a significant improvement in wound size and periwound condition.  He is to continue with bleach water soak, will reevaluate next clinic visit.          REVIEW OF SYSTEMS:   Review of Systems   Constitutional: Negative for chills and fever.        States he is eating well, appetite normal   Respiratory: Negative for cough, shortness of breath and wheezing.    Cardiovascular: Positive for leg swelling. Negative for chest pain, palpitations and claudication.        Chronic swelling in legs, for several years   Gastrointestinal: Negative  for nausea and vomiting.   Musculoskeletal: Positive for joint pain. Negative for myalgias.        Chronic   Neurological: Negative for dizziness, weakness and headaches.   Psychiatric/Behavioral: Positive for depression.       PHYSICAL EXAMINATION:   /88   Pulse 72   Temp 37.2 °C (98.9 °F)   Resp 16   SpO2 98%     Physical Exam   Constitutional: He is oriented to person, place, and time and well-developed, well-nourished, and in no distress. No distress.   HENT:   Head: Normocephalic and atraumatic.   Eyes: Pupils are equal, round, and reactive to light. Conjunctivae and EOM are normal.   Cardiovascular: Intact distal pulses.   Pulmonary/Chest: Effort normal. No respiratory distress. He has no wheezes.   Musculoskeletal:         General: Deformity and edema present. Normal range of motion.      Cervical back: Neck supple.      Comments: Left charcot foot and ankle  1+pitting edema to bilateral LE   Neurological: He is alert and oriented to person, place, and time.   Skin: Skin is warm. He is not diaphoretic.   Large full-thickness wounds to lower extremities  Significant decrease in Pseudomonas to left lower extremity, this continues to improve with bleach baths to left lower extremity.  I can differentiate wound bed from viable tissue.  Refer to wound flowsheet and photos     Psychiatric: Mood, affect and judgment normal.                                                         WOUND ASSESSMENT         Wound 06/05/20 Full Thickness Wound Leg Left --Left Lateral LE (Active)   Wound Image    02/24/21 1400   Site Assessment Red;Yellow 02/24/21 1400   Periwound Assessment Edema;Dry;Pink;White 02/24/21 1400   Margins Attached edges 02/24/21 1400   Closure Secondary intention 01/20/21 1300   Drainage Amount Moderate 02/24/21 1400   Drainage Description Serosanguineous 02/24/21 1400   Treatments Cleansed;Topical Lidocaine;Provider debridement 02/24/21 1400   Wound Cleansing Puracyn Spray 02/24/21 1400    Periwound Protectant TAC Ointment;Barrier Paste 02/24/21 1400   Dressing Cleansing/Solutions Not Applicable 02/24/21 1400   Dressing Options Hydrofiber Silver;Super Absorbent Pad;Dry Roll Gauze;Hypafix Tape;Tubigrip 02/24/21 1400   Dressing Changed Changed 02/24/21 1400   Dressing Status Clean;Dry;Intact 02/24/21 1400   Dressing Change/Treatment Frequency Every 48 hrs, and As Needed 01/20/21 1300   Non-staged Wound Description Full thickness 02/24/21 1400   Wound Length (cm) 16.5 cm 02/24/21 1400   Wound Width (cm) 4.5 cm 02/24/21 1400   Wound Depth (cm) 0 cm 02/24/21 1400   Wound Surface Area (cm^2) 74.25 cm^2 02/24/21 1400   Wound Volume (cm^3) 0 cm^3 02/24/21 1400   Post-Procedure Length (cm) 16.8 cm 02/24/21 1400   Post-Procedure Width (cm) 4.7 cm 02/24/21 1400   Post-Procedure Depth (cm) 0.1 cm 02/24/21 1400   Post-Procedure Surface Area (cm^2) 78.96 cm^2 02/24/21 1400   Post-Procedure Volume (cm^3) 7.9 cm^3 02/24/21 1400   Wound Healing % 100 02/24/21 1400   Wound Bed Granulation (%) 10 % 02/10/21 1300   Wound Bed Epithelium (%) 90 % 02/10/21 1300   Wound Bed Slough (%) 0 % 02/10/21 1300   Wound Bed Eschar (%) 0 % 01/13/21 1345   Wound Bed Granulation (%) - Post-Procedure 100 % 09/30/20 1400   Tunneling (cm) 0 cm 02/17/21 1300   Undermining (cm) 0 cm 02/17/21 1300   Wound Odor None 02/17/21 1300   Exposed Structures None 02/17/21 1300       Wound 01/20/21 -Left Medial/Posterior/Lateral LE (Active)   Wound Image    02/24/21 1400   Site Assessment Yellow;[place] 02/24/21 1400   Periwound Assessment Edema;Fragile;Pink;Dry 02/24/21 1400   Margins Unattached edges 02/24/21 1400   Closure Secondary intention 02/24/21 1400   Drainage Amount Moderate 02/24/21 1400   Drainage Description Serosanguineous;Yellow 02/24/21 1400   Treatments Cleansed;Topical Lidocaine;Provider debridement 02/24/21 1400   Wound Cleansing [place] 02/24/21 1400   Periwound Protectant Barrier Paste;TAC Ointment 02/24/21 1400   Dressing  Cleansing/Solutions Not Applicable 02/24/21 1400   Dressing Options Hydrofiber Silver;Absorbent Abdominal Pad;Dry Roll Gauze;Hypafix Tape;Tubigrip 02/24/21 1400   Dressing Changed Changed 02/24/21 1400   Dressing Status Clean;Dry;Intact 02/24/21 1400   Non-staged Wound Description Full thickness 02/24/21 1400   Wound Length (cm) 17 cm 02/24/21 1400   Wound Width (cm) 9.2 cm 02/24/21 1400   Wound Depth (cm) 0.2 cm 02/24/21 1400   Wound Surface Area (cm^2) 156.4 cm^2 02/24/21 1400   Wound Volume (cm^3) 31.28 cm^3 02/24/21 1400   Post-Procedure Length (cm) 16.8 cm 02/24/21 1400   Post-Procedure Width (cm) 10 cm 02/24/21 1400   Post-Procedure Depth (cm) 8 cm 02/24/21 1400   Post-Procedure Surface Area (cm^2) 168 cm^2 02/24/21 1400   Post-Procedure Volume (cm^3) 1344 cm^3 02/24/21 1400   Wound Healing % -2620 02/24/21 1400   Wound Bed Granulation (%) 30 % 02/10/21 1300   Wound Bed Epithelium (%) 0 % 02/10/21 1300   Wound Bed Slough (%) 70 % 02/10/21 1300   Wound Bed Eschar (%) 0 % 02/10/21 1300   Tunneling (cm) 0 cm 02/24/21 1400   Undermining (cm) 0 cm 02/24/21 1400   Wound Odor Mild 02/24/21 1400   Exposed Structures None 02/24/21 1400       Wound 02/24/21 Left medial Ankle (Active)   Site Assessment Red;White;Yellow 02/24/21 1400   Periwound Assessment Pink;White;Dry 02/24/21 1400   Margins Unattached edges 02/24/21 1400   Closure Secondary intention 02/24/21 1400   Drainage Amount Moderate 02/24/21 1400   Drainage Description Serosanguineous 02/24/21 1400   Treatments Cleansed;Topical Lidocaine;Provider debridement 02/24/21 1400   Wound Cleansing Puracyn [place] 02/24/21 1400   Periwound Protectant TAC Ointment;Barrier Paste 02/24/21 1400   Dressing Cleansing/Solutions Not Applicable 02/24/21 1400   Dressing Options Hydrofiber Silver;Super Absorbent Pad;Dry Roll Gauze;Hypafix Tape;Tubigrip 02/24/21 1400   Dressing Changed Changed 02/24/21 1400   Dressing Status Clean;Dry;Intact 02/24/21 1400   Non-staged Wound  Description Full thickness 21 1400   Wound Length (cm) 4 cm 21 1400   Wound Width (cm) 2.9 cm 21 1400   Wound Depth (cm) 0.3 cm 21 1400   Wound Surface Area (cm^2) 11.6 cm^2 21 1400   Wound Volume (cm^3) 3.48 cm^3 21 1400   Tunneling (cm) 0 cm 21 1400   Undermining (cm) 0 cm 21 1400   Wound Odor Mild 21 1400   Exposed Structures None 21 1400          PROCEDURE: Excisional debridement of left lower extremity wounds   -2% viscous lidocaine applied topically to wound beds for approximately 10 minutes prior to debridement  -Curette used to excise thick layer of slough from wound beds.  Excisional debridement was performed to remove devitalized tissue from all wounds until healthy, bleeding tissue was visualized.  Total area debrided approximately (100% accuracy of wound measurement difficult due to the diffuse nature of the wounds) 200 cm² tissue debrided into the subcutaneous layer of all wounds.    -Bleeding controlled with manual pressure.    -Wounds cleansed with normal saline  -Patient tolerated the procedure well, without c/o pain or discomfort.         Pertinent Labs and Diagnostics:    Labs:     A1c:   Lab Results   Component Value Date/Time    HBA1C 5.7 (H) 2020 11:22 AM      Imagin/3/2020-MRI left tibia-fibula with and without contrast  IMPRESSION:     Moderate lateral leg cellulitis and underlying myositis     No abscess or osteomyelitis    VASCULAR STUDIES: [facility]UPINDER 2019   Right.    Doppler waveform of the common femoral artery is of high amplitude and    triphasic.    Doppler waveforms at the ankle are brisk and triphasic.    Ankle-brachial index is normal.       Left.    Could not perform ankle-brachial index due to large blisters/ulcers.   Normal toe-brachial index: 0.94   Doppler waveform of the common femoral artery is of high amplitude and    triphasic.    Biphasic waveforms seen at the ankle.     WOUND CULTURE:    2020:  Culture collected in clinic  Culture Result Abnormal   Staphylococcus aureus   Light growth     Culture Result Abnormal   Pseudomonas aeruginosa   Light growth     Culture Result Abnormal   Enterococcus faecalis   Light growth              ASSESSMENT AND PLAN:     1. Skin ulcer of left lower leg with fat layer exposed (HCC)  Comment: Patient has history of recurring ulcerations to left lower extremity.  Has undergone ablation procedures by Dr. White.  Discharged from [facility] in March 2020 due to healing, returned approximately 3 weeks later much worse.  Hospitalized from 4/16 until 5/21/2020 underwent multiple surgical debridements.    02/24/21: Patient reports that he is soaking his leg twice a week in solution of water and bleach as described above.  Pseudomonas continues to decrease with the use of bleach bath prior to wound dressing changes..      -Excisional debridement of wounds in clinic today, medically necessary to promote wound healing.  -Patient to return to clinic weekly for assessment and debridement  -Home health to change dressing 2- 3 times per week in between clinic visits until we can establish a dry periwound environment without Pseudomonas. (This is improving)  -Patient is to continue to soak his left leg in a bleach bath for approximately 5 to 10 minutes prior to wound dressing changes.        Wound care: Hydrofiber silver, absorbent abdominal pad, dry roll gauze, Hypafix tape, Tubigrip D    2. Chronic venous stasis  Comments: Hemosiderin staining, brawny edema, scarring- findings consistent with CVI.  Pt established at [facility], underwent ablation procedures in 2019.     -Consider referral back to vascular if wounds failed to progress or deteriorate.    3. Acquired deformity of left ankle and foot  Comments: Charcot deformity of foot and ankle.  Very little ROM of ankle.  Patient was referred to orthopedics previously, however did not make appointment.  -Patient will need to follow-up with  orthopedics once wounds are healed.    4.  Wound infection     02/24/21:   -Patient to continue bleach solution as described above for 5 to 10 minutes on each day of dressing changes  -Some residual pseudomonas to inferior wound bed however, significantly decreased and improving. Continue with bleach soaks    5.  Pain associated with wound    02/24/21:  -2% viscous lidocaine applied topically to wound bed for approximately 5 minutes prior to debridement  -Patient tolerated procedure today with no complaints of discomfort.      Please note that this note may have been created using voice recognition software. I have worked with technical experts from [facility] to optimize the interface.  I have made every reasonable attempt to correct obvious errors, but there may be errors of grammar and possibly content that I did not discover before finalizing the note.    N

## 2021-03-01 NOTE — PATIENT INSTRUCTIONS
-Keep your wound dressing clean, dry, and intact.    -Change your dressing if it becomes soiled, soaked, or falls off.    -Remove your compression wrap if you have severe pain, severe swelling, numbness, color change, or temperature change in your toes. If you need to remove your compression wrap, do so by unrolling it. Do not cut the compression wrap off to prevent cutting yourself on accident.    -Should you experience any significant changes in your wound(s), such as infection (redness, swelling, localized heat, increased pain, fever > 101 F, chills) or have any questions regarding your home care instructions, please contact the wound center at [phone]. If after hours, contact your primary care physician or go to the hospital emergency room.     
Yes

## 2021-03-03 ENCOUNTER — OFFICE VISIT (OUTPATIENT)
Dept: WOUND CARE | Facility: MEDICAL CENTER | Age: 66
End: 2021-03-03
Attending: NURSE PRACTITIONER
Payer: MEDICARE

## 2021-03-03 VITALS
HEART RATE: 78 BPM | DIASTOLIC BLOOD PRESSURE: 83 MMHG | TEMPERATURE: 98.5 F | SYSTOLIC BLOOD PRESSURE: 136 MMHG | OXYGEN SATURATION: 98 % | RESPIRATION RATE: 20 BRPM

## 2021-03-03 DIAGNOSIS — T14.8XXA WOUND INFECTION: ICD-10-CM

## 2021-03-03 DIAGNOSIS — M21.962 ACQUIRED DEFORMITY OF LEFT ANKLE AND FOOT: ICD-10-CM

## 2021-03-03 DIAGNOSIS — I10 ESSENTIAL HYPERTENSION: ICD-10-CM

## 2021-03-03 DIAGNOSIS — L97.922 SKIN ULCER OF LEFT LOWER LEG WITH FAT LAYER EXPOSED (HCC): Primary | ICD-10-CM

## 2021-03-03 DIAGNOSIS — I87.8 CHRONIC VENOUS STASIS: ICD-10-CM

## 2021-03-03 DIAGNOSIS — R52 PAIN ASSOCIATED WITH WOUND: ICD-10-CM

## 2021-03-03 DIAGNOSIS — T14.8XXA PAIN ASSOCIATED WITH WOUND: ICD-10-CM

## 2021-03-03 DIAGNOSIS — L08.9 WOUND INFECTION: ICD-10-CM

## 2021-03-03 PROCEDURE — 11042 DBRDMT SUBQ TIS 1ST 20SQCM/<: CPT

## 2021-03-03 PROCEDURE — 11045 DBRDMT SUBQ TISS EACH ADDL: CPT

## 2021-03-03 PROCEDURE — 11045 DBRDMT SUBQ TISS EACH ADDL: CPT | Performed by: NURSE PRACTITIONER

## 2021-03-03 PROCEDURE — 11042 DBRDMT SUBQ TIS 1ST 20SQCM/<: CPT | Performed by: NURSE PRACTITIONER

## 2021-03-03 RX ORDER — AMLODIPINE BESYLATE 10 MG/1
TABLET ORAL
Qty: 90 TABLET | Refills: 3 | Status: SHIPPED | OUTPATIENT
Start: 2021-03-03 | End: 2022-06-02

## 2021-03-03 ASSESSMENT — ENCOUNTER SYMPTOMS
COUGH: 0
HEADACHES: 0
DEPRESSION: 1
MYALGIAS: 0
PALPITATIONS: 0
SHORTNESS OF BREATH: 0
FEVER: 0
DIZZINESS: 0
NAUSEA: 0
WHEEZING: 0
WEAKNESS: 0
CHILLS: 0
CLAUDICATION: 0
VOMITING: 0

## 2021-03-03 ASSESSMENT — PAIN SCALES - GENERAL: PAINLEVEL: 4=SLIGHT-MODERATE PAIN

## 2021-03-03 NOTE — PATIENT INSTRUCTIONS
-Keep dressings clean and dry. Change dressings if they become over saturated, soiled or fall off.     -Avoid prolonged standing or sitting without elevating your legs.    -Remove your compression garments if you have severe pain, severe swelling, numbness, color change, or temperature change in your toes. If you need to remove your compression garments, do so by unrolling them. Do not cut the compression garments off, this is to prevent cutting yourself on accident.    -Should you experience any significant changes in your wound(s), such as signs of infection (redness, swelling, localized heat, increased pain, fever > 101 F, chills) or have any questions regarding your home care instructions, please contact the wound center at [phone]. If after hours, contact your primary care physician or go to the hospital emergency room.

## 2021-03-03 NOTE — PROGRESS NOTES
Provider Encounter- Full Thickness wound    HISTORY OF PRESENT ILLNESS  Wound History:    START OF CARE IN CLINIC: 5/20/2020               REFERRING PROVIDER: KEV Keith                 WOUND ETIOLOGY: venous,  likely mixed etiology              LOCATION: Left lower leg, multiple wounds                                   Right lower leg, multiple wounds-first observed in clinic on initial evaluation, 6/5/2020.  Resolved 9/2/2020                HISTORY: Patient is very well-known to this clinic from previous treatment of wounds to his left lower extremity.  He was discharged from the clinic in March 2020 due to full resolution of his wound.  He returned to the clinic approximately 3 weeks later, on 4/16, with cellulitis, edema, and large open wounds to his left lower leg, and was sent directly to [facility] emergency room.  He was admitted for IV antibiotics and surgical intervention.  On 4/22/2020, he underwent an I&D of his left lower extremity, fasciotomy, and VAC placement.  In the following weeks he underwent surgery 3 more times for irrigation and debridement.  He was discharged home on 5/21, on the VAC, with home health and a referral to [facility].       Pertinent Medical History:  Anxiety, Charcot's joint of left foot, non-diabetic (3/21/2016), Chronic congestive heart failure (HCC) (11/16/2017), Hepatitis C, chronic (HCC), Hypertension, Migraine, Polysubstance abuse (HCC) (3/8/2018), Tobacco use (4/18/2016), Ulcer of left lower extremity with necrosis of muscle (HCC) (3/21/2016), and Venous stasis ulcer ([facility]) (2017).                ETIOLOGY HISTORY:  Vascular Surgeon: Dr. White. Compression Circ-aid. Varicose Veins none visible     TOBACCO USE: Patient denies; patient also denies alcohol and recreational drug use    Patient's problem list, allergies, and current medications reviewed and updated in Epic    Interval History:  Interval History thinned 2/10/2021.  Please see previous notes for  complete interval history.       2/10/2021: Clinic visit with KEV Luna. Patient states that they are feeling well today.  Patient denies fever, chills, nausea, vomiting, lightheadedness, dizziness, shortness of breath and chest pain.  Patient with significant decrease in Pseudomonas to left lower extremity.  Patient is to finish his course of ciprofloxacin on 2/13/2021.  Patient is to continue with bleach soaks prior to wound dressing changes.  Continue with 3 times a week dressing changes.  Wound looks significantly better.    2/17/2021: Clinic visit with KEV Luna. Patient states that they are feeling well today.  Patient denies fever, chills, nausea, vomiting, lightheadedness, dizziness, shortness of breath and chest pain.  Wound bed and periwound area significantly improved we will continue with bleach soaks prior to wound dressing changes.  Pseudomonas has significantly declined.  Continue to monitor patient has finished ciprofloxacin.  Patient did have a significant buildup of slough to wound beds only.    2/24/2021: Clinic visit with KEV Luna. Patient states that they are feeling well today.  Patient denies fever, chills, nausea, vomiting, lightheadedness, dizziness, shortness of breath and chest pain. Patient reports that he is soaking his leg in a bleach water bath two times a week prior to wound care dressing changes.  Patient has had a significant improvement in wound size and periwound condition.  He is to continue with bleach water soak, will reevaluate next clinic visit.      3/3/2021: Clinic visit with KEV Luna. Patient states that they are feeling well today.  Patient denies fever, chills, nausea, vomiting, lightheadedness, dizziness, shortness of breath and chest pain.   Patient has decreased depth to wounds with improved granulation tissue growth.  Patient still has a significant amount of slough to the surface of all wound beds.  Patient is to  continue with bleach soaks of his left lower leg.  This is helping to keep the Pseudomonas level in check and allowing for wound bed progression.        REVIEW OF SYSTEMS:   Review of Systems   Constitutional: Negative for chills and fever.        States he is eating well, appetite normal   Respiratory: Negative for cough, shortness of breath and wheezing.    Cardiovascular: Positive for leg swelling. Negative for chest pain, palpitations and claudication.        Chronic swelling in legs, for several years   Gastrointestinal: Negative for nausea and vomiting.   Musculoskeletal: Positive for joint pain. Negative for myalgias.        Chronic   Neurological: Negative for dizziness, weakness and headaches.   Psychiatric/Behavioral: Positive for depression.       PHYSICAL EXAMINATION:   /83   Pulse 78   Temp 36.9 °C (98.5 °F)   Resp 20   SpO2 98%     Physical Exam   Constitutional: He is oriented to person, place, and time and well-developed, well-nourished, and in no distress. No distress.   HENT:   Head: Normocephalic and atraumatic.   Eyes: Pupils are equal, round, and reactive to light. Conjunctivae and EOM are normal.   Cardiovascular: Intact distal pulses.   Pulmonary/Chest: Effort normal. No respiratory distress. He has no wheezes.   Musculoskeletal:         General: Deformity present. No edema. Normal range of motion.      Cervical back: Neck supple.      Comments: Left charcot foot and ankle     Neurological: He is alert and oriented to person, place, and time.   Skin: Skin is warm. He is not diaphoretic.   Large full-thickness wounds to lower extremities  Significant decrease in Pseudomonas to left lower extremity, this continues to improve with bleach baths to left lower extremity. Refer to wound flowsheet and photos     Psychiatric: Mood, affect and judgment normal.                                                         WOUND ASSESSMENT             Wound 06/05/20 Full Thickness Wound Leg Left  --Left Lateral LE (Active)   Wound Image    03/03/21 1315   Site Assessment Red;Yellow 03/03/21 1315   Periwound Assessment Edema 03/03/21 1315   Margins Attached edges 03/03/21 1315   Closure Secondary intention 01/20/21 1300   Drainage Amount Large 03/03/21 1315   Drainage Description Serosanguineous;Yellow 03/03/21 1315   Treatments Cleansed;Topical Lidocaine;Provider debridement 03/03/21 1315   Wound Cleansing Foam Cleanser/Washcloth 03/03/21 1315   Periwound Protectant Skin Moisturizer;Barrier Paste 03/03/21 1315   Dressing Cleansing/Solutions Not Applicable 03/03/21 1315   Dressing Options Hydrofiber Silver;Absorbent Abdominal Pad;Dry Roll Gauze;Hypafix Tape;Tubigrip 03/03/21 1315   Dressing Changed Changed 02/24/21 1400   Dressing Status Clean;Dry;Intact 02/24/21 1400   Dressing Change/Treatment Frequency Every 48 hrs, and As Needed 01/20/21 1300   Non-staged Wound Description Full thickness 03/03/21 1315   Wound Length (cm) 18 cm 03/03/21 1315   Wound Width (cm) 5.2 cm 03/03/21 1315   Wound Depth (cm) 0.2 cm 03/03/21 1315   Wound Surface Area (cm^2) 93.6 cm^2 03/03/21 1315   Wound Volume (cm^3) 18.72 cm^3 03/03/21 1315   Post-Procedure Length (cm) 16.5 cm 03/03/21 1315   Post-Procedure Width (cm) 5.2 cm 03/03/21 1315   Post-Procedure Depth (cm) 0.2 cm 03/03/21 1315   Post-Procedure Surface Area (cm^2) 85.8 cm^2 03/03/21 1315   Post-Procedure Volume (cm^3) 17.16 cm^3 03/03/21 1315   Wound Healing % 66 03/03/21 1315   Wound Bed Granulation (%) 10 % 02/10/21 1300   Wound Bed Epithelium (%) 90 % 02/10/21 1300   Wound Bed Slough (%) 0 % 02/10/21 1300   Wound Bed Eschar (%) 0 % 01/13/21 1345   Tunneling (cm) 0 cm 03/03/21 1315   Undermining (cm) 0 cm 03/03/21 1315   Wound Odor None 03/03/21 1315   Exposed Structures None 03/03/21 1315       Wound 01/20/21 -Left Medial/Posterior/Lateral LE (Active)   Wound Image    03/03/21 1315   Site Assessment Red;Yellow 03/03/21 1315   Periwound Assessment Edema;Fragile  03/03/21 1315   Margins Epibole (rolled edges);Attached edges 03/03/21 1315   Closure Secondary intention 02/24/21 1400   Drainage Amount Large 03/03/21 1315   Drainage Description Serosanguineous 03/03/21 1315   Treatments Cleansed;Topical Lidocaine;Provider debridement 03/03/21 1315   Wound Cleansing Foam Cleanser/Washcloth 03/03/21 1315   Periwound Protectant Skin Moisturizer;Barrier Paste 03/03/21 1315   Dressing Cleansing/Solutions Not Applicable 03/03/21 1315   Dressing Options Hydrofiber Silver;Absorbent Abdominal Pad;Dry Roll Gauze;Hypafix Tape;Tubigrip 03/03/21 1315   Dressing Changed Changed 02/24/21 1400   Dressing Status Clean;Dry;Intact 02/24/21 1400   Non-staged Wound Description Full thickness 03/03/21 1315   Wound Length (cm) 8.5 cm 03/03/21 1315   Wound Width (cm) 8 cm 03/03/21 1315   Wound Depth (cm) 0.2 cm 03/03/21 1315   Wound Surface Area (cm^2) 68 cm^2 03/03/21 1315   Wound Volume (cm^3) 13.6 cm^3 03/03/21 1315   Post-Procedure Length (cm) 10.6 cm 03/03/21 1315   Post-Procedure Width (cm) 9 cm 03/03/21 1315   Post-Procedure Depth (cm) 0.2 cm 03/03/21 1315   Post-Procedure Surface Area (cm^2) 95.4 cm^2 03/03/21 1315   Post-Procedure Volume (cm^3) 19.08 cm^3 03/03/21 1315   Wound Healing % -1083 03/03/21 1315   Wound Bed Granulation (%) 30 % 02/10/21 1300   Wound Bed Epithelium (%) 0 % 02/10/21 1300   Wound Bed Slough (%) 70 % 02/10/21 1300   Wound Bed Eschar (%) 0 % 02/10/21 1300   Tunneling (cm) 0 cm 03/03/21 1315   Undermining (cm) 0 cm 03/03/21 1315   Wound Odor None 03/03/21 1315   Exposed Structures None 03/03/21 1315       Wound 02/24/21 Left medial Ankle (Active)   Wound Image    03/03/21 1315   Site Assessment Pink;Red;Yellow 03/03/21 1315   Periwound Assessment Edema;Fragile 03/03/21 1315   Margins Attached edges 03/03/21 1315   Closure Secondary intention 02/24/21 1400   Drainage Amount Moderate 03/03/21 1315   Drainage Description Serosanguineous 03/03/21 1315   Treatments  Cleansed;Topical Lidocaine;Provider debridement 03/03/21 1315   Wound Cleansing Foam Cleanser/Washcloth 03/03/21 1315   Periwound Protectant Skin Moisturizer;Barrier Paste 03/03/21 1315   Dressing Cleansing/Solutions Not Applicable 03/03/21 1315   Dressing Options Hydrofiber Silver;Absorbent Abdominal Pad;Dry Roll Gauze;Hypafix Tape;Tubigrip 03/03/21 1315   Dressing Changed Changed 02/24/21 1400   Dressing Status Clean;Dry;Intact 02/24/21 1400   Non-staged Wound Description Full thickness 03/03/21 1315   Wound Length (cm) 4.5 cm 03/03/21 1315   Wound Width (cm) 3 cm 03/03/21 1315   Wound Depth (cm) 0.1 cm 03/03/21 1315   Wound Surface Area (cm^2) 13.5 cm^2 03/03/21 1315   Wound Volume (cm^3) 1.35 cm^3 03/03/21 1315   Post-Procedure Length (cm) 5 cm 03/03/21 1315   Post-Procedure Width (cm) 3.1 cm 03/03/21 1315   Post-Procedure Depth (cm) 0.2 cm 03/03/21 1315   Post-Procedure Surface Area (cm^2) 15.5 cm^2 03/03/21 1315   Post-Procedure Volume (cm^3) 3.1 cm^3 03/03/21 1315   Wound Healing % 61 03/03/21 1315   Tunneling (cm) 0 cm 03/03/21 1315   Undermining (cm) 0 cm 03/03/21 1315   Wound Odor None 03/03/21 1315   Exposed Structures None 03/03/21 1315                 PROCEDURE: Excisional debridement of left lower extremity wounds   -2% viscous lidocaine applied topically to wound beds for approximately 10 minutes prior to debridement  -Curette used to excise thick layer of slough from wound beds.  Excisional debridement was performed to remove devitalized tissue from all wounds until healthy, bleeding tissue was visualized.  Total area debrided approximately (100% accuracy of wound measurement difficult due to the diffuse nature of the wounds) 200 cm² tissue debrided into the subcutaneous layer of all wounds.    -Bleeding controlled with manual pressure.    -Wounds cleansed with normal saline  -Patient tolerated the procedure well, without c/o pain or discomfort.         Pertinent Labs and Diagnostics:    Labs:      A1c:   Lab Results   Component Value Date/Time    HBA1C 5.7 (H) 2020 11:22 AM      Imagin/3/2020-MRI left tibia-fibula with and without contrast  IMPRESSION:     Moderate lateral leg cellulitis and underlying myositis     No abscess or osteomyelitis    VASCULAR STUDIES: [facility]UPINDER 2019   Right.    Doppler waveform of the common femoral artery is of high amplitude and    triphasic.    Doppler waveforms at the ankle are brisk and triphasic.    Ankle-brachial index is normal.       Left.    Could not perform ankle-brachial index due to large blisters/ulcers.   Normal toe-brachial index: 0.94   Doppler waveform of the common femoral artery is of high amplitude and    triphasic.    Biphasic waveforms seen at the ankle.     WOUND CULTURE:    2020: Culture collected in clinic  Culture Result Abnormal   Staphylococcus aureus   Light growth     Culture Result Abnormal   Pseudomonas aeruginosa   Light growth     Culture Result Abnormal   Enterococcus faecalis   Light growth              ASSESSMENT AND PLAN:     1. Skin ulcer of left lower leg with fat layer exposed (HCC)  Comment: Patient has history of recurring ulcerations to left lower extremity.  Has undergone ablation procedures by Dr. White.  Discharged from [facility] in 2020 due to healing, returned approximately 3 weeks later much worse.  Hospitalized from  until 2020 underwent multiple surgical debridements.    21: Patient reports that he is soaking his leg twice a week in solution of water and bleach as described above.  Pseudomonas continues to decrease with the use of bleach bath prior to wound dressing changes..      -Excisional debridement of wounds in clinic today, medically necessary to promote wound healing.  -Patient to return to clinic weekly for assessment and debridement  -Home health to change dressing 2- 3 times per week in between clinic visits until we can establish a dry periwound environment without  Pseudomonas. (This is improving)  -Patient is to continue to soak his left leg in a bleach bath for approximately 5 to 10 minutes prior to wound dressing changes.        Wound care: Hydrofiber silver,  abdominal pad, dry roll gauze, Hypafix tape, Tubigrip C    2. Chronic venous stasis  Comments: Hemosiderin staining, brawny edema, scarring- findings consistent with CVI.  Pt established at [facility], underwent ablation procedures in 2019.     -Consider referral back to vascular if wounds failed to progress or deteriorate.    3. Acquired deformity of left ankle and foot  Comments: Charcot deformity of foot and ankle.  Very little ROM of ankle.  Patient was referred to orthopedics previously, however did not make appointment.  -Patient will need to follow-up with orthopedics once wounds are healed.    4.  Wound infection     03/03/21:   -Patient to continue bleach solution as described above for 5 to 10 minutes on each day of dressing changes  -Some residual pseudomonas to inferior wound bed however, significantly decreased and improving. Continue with bleach soaks    5.  Pain associated with wound    03/03/21:  -2% viscous lidocaine applied topically to wound bed for approximately 5 minutes prior to debridement  -Patient tolerated procedure today with no complaints of discomfort.      Please note that this note may have been created using voice recognition software. I have worked with technical experts from [employer] to optimize the interface.  I have made every reasonable attempt to correct obvious errors, but there may be errors of grammar and possibly content that I did not discover before finalizing the note.    N

## 2021-03-10 ENCOUNTER — OFFICE VISIT (OUTPATIENT)
Dept: WOUND CARE | Facility: MEDICAL CENTER | Age: 66
End: 2021-03-10
Attending: NURSE PRACTITIONER
Payer: MEDICARE

## 2021-03-10 VITALS
HEART RATE: 71 BPM | SYSTOLIC BLOOD PRESSURE: 135 MMHG | OXYGEN SATURATION: 98 % | TEMPERATURE: 97.8 F | RESPIRATION RATE: 20 BRPM | DIASTOLIC BLOOD PRESSURE: 94 MMHG

## 2021-03-10 DIAGNOSIS — L97.929 VENOUS STASIS ULCER OF LEFT LOWER EXTREMITY (HCC): ICD-10-CM

## 2021-03-10 DIAGNOSIS — L97.929 ULCER OF LEFT LOWER EXTREMITY, UNSPECIFIED ULCER STAGE (HCC): ICD-10-CM

## 2021-03-10 DIAGNOSIS — I83.029 VENOUS STASIS ULCER OF LEFT LOWER EXTREMITY (HCC): ICD-10-CM

## 2021-03-10 DIAGNOSIS — L97.921 ULCER OF LEFT LOWER EXTREMITY, LIMITED TO BREAKDOWN OF SKIN (HCC): ICD-10-CM

## 2021-03-10 DIAGNOSIS — L97.922 SKIN ULCER OF LEFT LOWER LEG WITH FAT LAYER EXPOSED (HCC): ICD-10-CM

## 2021-03-10 PROCEDURE — 11042 DBRDMT SUBQ TIS 1ST 20SQCM/<: CPT

## 2021-03-10 PROCEDURE — 11045 DBRDMT SUBQ TISS EACH ADDL: CPT | Performed by: NURSE PRACTITIONER

## 2021-03-10 PROCEDURE — 11045 DBRDMT SUBQ TISS EACH ADDL: CPT

## 2021-03-10 PROCEDURE — 11042 DBRDMT SUBQ TIS 1ST 20SQCM/<: CPT | Performed by: NURSE PRACTITIONER

## 2021-03-10 ASSESSMENT — ENCOUNTER SYMPTOMS
WHEEZING: 0
FEVER: 0
DIZZINESS: 0
MYALGIAS: 0
COUGH: 0
CLAUDICATION: 0
SHORTNESS OF BREATH: 0
CHILLS: 0
PALPITATIONS: 0
NAUSEA: 0
VOMITING: 0
DEPRESSION: 1
WEAKNESS: 0
HEADACHES: 0

## 2021-03-10 ASSESSMENT — PAIN SCALES - GENERAL: PAINLEVEL: 4=SLIGHT-MODERATE PAIN

## 2021-03-10 NOTE — PROGRESS NOTES
Provider Encounter- Full Thickness wound    HISTORY OF PRESENT ILLNESS  Wound History:    START OF CARE IN CLINIC: 5/20/2020               REFERRING PROVIDER: KEV Keith                 WOUND ETIOLOGY: venous,  likely mixed etiology              LOCATION: Left lower leg, multiple wounds                                   Right lower leg, multiple wounds-first observed in clinic on initial evaluation, 6/5/2020.  Resolved 9/2/2020                HISTORY: Patient is very well-known to this clinic from previous treatment of wounds to his left lower extremity.  He was discharged from the clinic in March 2020 due to full resolution of his wound.  He returned to the clinic approximately 3 weeks later, on 4/16, with cellulitis, edema, and large open wounds to his left lower leg, and was sent directly to [facility] emergency room.  He was admitted for IV antibiotics and surgical intervention.  On 4/22/2020, he underwent an I&D of his left lower extremity, fasciotomy, and VAC placement.  In the following weeks he underwent surgery 3 more times for irrigation and debridement.  He was discharged home on 5/21, on the VAC, with home health and a referral to [facility].       Pertinent Medical History:  Anxiety, Charcot's joint of left foot, non-diabetic (3/21/2016), Chronic congestive heart failure (HCC) (11/16/2017), Hepatitis C, chronic (HCC), Hypertension, Migraine, Polysubstance abuse (HCC) (3/8/2018), Tobacco use (4/18/2016), Ulcer of left lower extremity with necrosis of muscle (HCC) (3/21/2016), and Venous stasis ulcer ([facility]) (2017).                ETIOLOGY HISTORY:  Vascular Surgeon: Dr. White. Compression Circ-aid. Varicose Veins none visible     TOBACCO USE: Patient denies; patient also denies alcohol and recreational drug use    Patient's problem list, allergies, and current medications reviewed and updated in Epic    Interval History:  Interval History thinned 2/10/2021.  Please see previous notes for  complete interval history.       2/10/2021: Clinic visit with KEV Luna. Patient states that they are feeling well today.  Patient denies fever, chills, nausea, vomiting, lightheadedness, dizziness, shortness of breath and chest pain.  Patient with significant decrease in Pseudomonas to left lower extremity.  Patient is to finish his course of ciprofloxacin on 2/13/2021.  Patient is to continue with bleach soaks prior to wound dressing changes.  Continue with 3 times a week dressing changes.  Wound looks significantly better.    2/17/2021: Clinic visit with KEV Luna. Patient states that they are feeling well today.  Patient denies fever, chills, nausea, vomiting, lightheadedness, dizziness, shortness of breath and chest pain.  Wound bed and periwound area significantly improved we will continue with bleach soaks prior to wound dressing changes.  Pseudomonas has significantly declined.  Continue to monitor patient has finished ciprofloxacin.  Patient did have a significant buildup of slough to wound beds only.    2/24/2021: Clinic visit with KEV Luna. Patient states that they are feeling well today.  Patient denies fever, chills, nausea, vomiting, lightheadedness, dizziness, shortness of breath and chest pain. Patient reports that he is soaking his leg in a bleach water bath two times a week prior to wound care dressing changes.  Patient has had a significant improvement in wound size and periwound condition.  He is to continue with bleach water soak, will reevaluate next clinic visit.      3/3/2021: Clinic visit with KEV Luna. Patient states that they are feeling well today.  Patient denies fever, chills, nausea, vomiting, lightheadedness, dizziness, shortness of breath and chest pain.   Patient has decreased depth to wounds with improved granulation tissue growth.  Patient still has a significant amount of slough to the surface of all wound beds.  Patient is to  continue with bleach soaks of his left lower leg.  This is helping to keep the Pseudomonas level in check and allowing for wound bed progression.      3/10/2021: Clinic visit with KEV Luna. Patient states that they are feeling well today.  Patient denies fever, chills, nausea, vomiting, lightheadedness, dizziness, shortness of breath and chest pain.  Patient's wounds are slowly progressing.  Patient still has a significant amount of pseudomonal component to his wound beds.  However, this is significantly improved we will continue to use bleach baths to decrease pseudomonal component.       REVIEW OF SYSTEMS:   Review of Systems   Constitutional: Negative for chills and fever.        States he is eating well, appetite normal   Respiratory: Negative for cough, shortness of breath and wheezing.    Cardiovascular: Positive for leg swelling. Negative for chest pain, palpitations and claudication.        Chronic swelling in legs, for several years   Gastrointestinal: Negative for nausea and vomiting.   Musculoskeletal: Positive for joint pain. Negative for myalgias.        Chronic   Neurological: Negative for dizziness, weakness and headaches.   Psychiatric/Behavioral: Positive for depression.       PHYSICAL EXAMINATION:   /94   Pulse 71   Temp 36.6 °C (97.8 °F)   Resp 20   SpO2 98%     Physical Exam   Constitutional: He is oriented to person, place, and time and well-developed, well-nourished, and in no distress. No distress.   HENT:   Head: Normocephalic and atraumatic.   Eyes: Pupils are equal, round, and reactive to light. Conjunctivae and EOM are normal.   Cardiovascular: Intact distal pulses.   Pulmonary/Chest: Effort normal. No respiratory distress. He has no wheezes.   Musculoskeletal:         General: Deformity present. No edema. Normal range of motion.      Cervical back: Neck supple.      Comments: Left charcot foot and ankle     Neurological: He is alert and oriented to person, place,  and time.   Skin: Skin is warm. He is not diaphoretic.   Large full-thickness wounds to lower extremities  Significant decrease in Pseudomonas to left lower extremity, this continues to improve with bleach baths to left lower extremity. Refer to wound flowsheet and photos     Psychiatric: Mood, affect and judgment normal.                                                         WOUND ASSESSMENT         Wound 06/05/20 Full Thickness Wound Leg Left --Left Lateral LE (Active)   Wound Image   03/10/21 1417   Site Assessment Red 03/10/21 1417   Periwound Assessment Edema;Fragile 03/10/21 1417   Margins Attached edges 03/10/21 1417   Closure Secondary intention 01/20/21 1300   Drainage Amount Copious 03/10/21 1417   Drainage Description Serosanguineous;Tan 03/10/21 1417   Treatments Cleansed;Topical Lidocaine;Provider debridement 03/10/21 1417   Wound Cleansing Foam Cleanser/Washcloth 03/10/21 1417   Periwound Protectant TAC Ointment;Skin Moisturizer;Barrier Paste 03/10/21 1417   Dressing Cleansing/Solutions Not Applicable 03/10/21 1417   Dressing Options Hydrofiber Silver;Absorbent Abdominal Pad;Dry Roll Gauze;Hypafix Tape;Tubigrip 03/10/21 1417   Dressing Changed Changed 02/24/21 1400   Dressing Status Clean;Dry;Intact 02/24/21 1400   Dressing Change/Treatment Frequency Every 48 hrs, and As Needed 01/20/21 1300   Non-staged Wound Description Full thickness 03/10/21 1417   Wound Length (cm) 18 cm 03/03/21 1315   Wound Width (cm) 5.2 cm 03/03/21 1315   Wound Depth (cm) 0.2 cm 03/03/21 1315   Wound Surface Area (cm^2) 93.6 cm^2 03/03/21 1315   Wound Volume (cm^3) 18.72 cm^3 03/03/21 1315   Post-Procedure Length (cm) 16.5 cm 03/10/21 1417   Post-Procedure Width (cm) 5 cm 03/10/21 1417   Post-Procedure Depth (cm) 0.2 cm 03/03/21 1315   Post-Procedure Surface Area (cm^2) 82.5 cm^2 03/10/21 1417   Post-Procedure Volume (cm^3) 17.16 cm^3 03/03/21 1315   Wound Healing % 66 03/03/21 1315   Wound Bed Granulation (%) 10 % 02/10/21  1300   Wound Bed Epithelium (%) 90 % 02/10/21 1300   Wound Bed Slough (%) 0 % 02/10/21 1300   Wound Bed Eschar (%) 0 % 01/13/21 1345   Tunneling (cm) 0 cm 03/10/21 1417   Undermining (cm) 0 cm 03/10/21 1417   Wound Odor None 03/10/21 1417   Exposed Structures None 03/10/21 1417       Wound 01/20/21 -Left Medial/Posterior/Lateral LE (Active)   Wound Image   03/10/21 1417   Site Assessment Red;Yellow 03/10/21 1417   Periwound Assessment Edema;Fragile 03/10/21 1417   Margins Unattached edges 03/10/21 1417   Closure Secondary intention 02/24/21 1400   Drainage Amount Copious 03/10/21 1417   Drainage Description Serosanguineous;Tan 03/10/21 1417   Treatments Cleansed;Topical Lidocaine;Provider debridement 03/10/21 1417   Wound Cleansing Foam Cleanser/Washcloth 03/10/21 1417   Periwound Protectant TAC Ointment;Skin Moisturizer;Barrier Paste 03/10/21 1417   Dressing Cleansing/Solutions Not Applicable 03/10/21 1417   Dressing Options Hydrofiber Silver;Absorbent Abdominal Pad;Dry Roll Gauze;Hypafix Tape;Tubigrip 03/10/21 1417   Dressing Changed Changed 02/24/21 1400   Dressing Status Clean;Dry;Intact 02/24/21 1400   Non-staged Wound Description Full thickness 03/10/21 1417   Wound Length (cm) 8.5 cm 03/03/21 1315   Wound Width (cm) 8 cm 03/03/21 1315   Wound Depth (cm) 0.2 cm 03/03/21 1315   Wound Surface Area (cm^2) 68 cm^2 03/03/21 1315   Wound Volume (cm^3) 13.6 cm^3 03/03/21 1315   Post-Procedure Length (cm) 12.7 cm 03/10/21 1417   Post-Procedure Width (cm) 14.6 cm 03/10/21 1417   Post-Procedure Depth (cm) 0.4 cm 03/10/21 1417   Post-Procedure Surface Area (cm^2) 185.42 cm^2 03/10/21 1417   Post-Procedure Volume (cm^3) 74.17 cm^3 03/10/21 1417   Wound Healing % -1083 03/03/21 1315   Wound Bed Granulation (%) 30 % 02/10/21 1300   Wound Bed Epithelium (%) 0 % 02/10/21 1300   Wound Bed Slough (%) 70 % 02/10/21 1300   Wound Bed Eschar (%) 0 % 02/10/21 1300   Tunneling (cm) 0 cm 03/10/21 1417   Undermining (cm) 0 cm 03/10/21  1417   Wound Odor None 03/10/21 1417   Exposed Structures None 03/10/21 1417       Wound 02/24/21 Left medial Ankle (Active)   Wound Image   03/10/21 1417   Site Assessment Red 03/10/21 1417   Periwound Assessment Edema;Fragile 03/10/21 1417   Margins Unattached edges 03/10/21 1417   Closure Secondary intention 02/24/21 1400   Drainage Amount Large 03/10/21 1417   Drainage Description Serosanguineous;Tan 03/10/21 1417   Treatments Cleansed;Topical Lidocaine;Provider debridement 03/10/21 1417   Wound Cleansing Foam Cleanser/Washcloth 03/10/21 1417   Periwound Protectant TAC Ointment;Skin Moisturizer;Barrier Paste 03/10/21 1417   Dressing Cleansing/Solutions Not Applicable 03/10/21 1417   Dressing Options Hydrofiber Silver;Absorbent Abdominal Pad;Dry Roll Gauze;Hypafix Tape;Tubigrip 03/10/21 1417   Dressing Changed Changed 02/24/21 1400   Dressing Status Clean;Dry;Intact 02/24/21 1400   Non-staged Wound Description Full thickness 03/10/21 1417   Wound Length (cm) 4.5 cm 03/03/21 1315   Wound Width (cm) 3 cm 03/03/21 1315   Wound Depth (cm) 0.1 cm 03/03/21 1315   Wound Surface Area (cm^2) 13.5 cm^2 03/03/21 1315   Wound Volume (cm^3) 1.35 cm^3 03/03/21 1315   Post-Procedure Length (cm) 5 cm 03/10/21 1417   Post-Procedure Width (cm) 2.9 cm 03/10/21 1417   Post-Procedure Depth (cm) 0.2 cm 03/10/21 1417   Post-Procedure Surface Area (cm^2) 14.5 cm^2 03/10/21 1417   Post-Procedure Volume (cm^3) 2.9 cm^3 03/10/21 1417   Wound Healing % 61 03/03/21 1315   Tunneling (cm) 0 cm 03/10/21 1417   Undermining (cm) 0 cm 03/10/21 1417   Wound Odor None 03/10/21 1417   Exposed Structures None 03/10/21 1417     PROCEDURE: Excisional debridement of left lower extremity wounds   -2% viscous lidocaine applied topically to wound beds for approximately 10 minutes prior to debridement  -Curette used to excise thick layer of slough from wound beds.  Excisional debridement was performed to remove devitalized tissue from all wounds until  healthy, bleeding tissue was visualized.  Total area debrided approximately (100% accuracy of wound measurement difficult due to the diffuse nature of the wounds) 200 cm² tissue debrided into the subcutaneous layer of all wounds.    -Bleeding controlled with manual pressure.    -Wounds cleansed with normal saline  -Patient tolerated the procedure well, without c/o pain or discomfort.         Pertinent Labs and Diagnostics:    Labs:     A1c:   Lab Results   Component Value Date/Time    HBA1C 5.7 (H) 2020 11:22 AM      Imagin/3/2020-MRI left tibia-fibula with and without contrast  IMPRESSION:     Moderate lateral leg cellulitis and underlying myositis     No abscess or osteomyelitis    VASCULAR STUDIES: [facility] 2019   Right.    Doppler waveform of the common femoral artery is of high amplitude and    triphasic.    Doppler waveforms at the ankle are brisk and triphasic.    Ankle-brachial index is normal.       Left.    Could not perform ankle-brachial index due to large blisters/ulcers.   Normal toe-brachial index: 0.94   Doppler waveform of the common femoral artery is of high amplitude and    triphasic.    Biphasic waveforms seen at the ankle.     WOUND CULTURE:    2020: Culture collected in clinic  Culture Result Abnormal   Staphylococcus aureus   Light growth     Culture Result Abnormal   Pseudomonas aeruginosa   Light growth     Culture Result Abnormal   Enterococcus faecalis   Light growth              ASSESSMENT AND PLAN:     1. Skin ulcer of left lower leg with fat layer exposed (HCC)  Comment: Patient has history of recurring ulcerations to left lower extremity.  Has undergone ablation procedures by Dr. White.  Discharged from [facility] in 2020 due to healing, returned approximately 3 weeks later much worse.  Hospitalized from  until 2020 underwent multiple surgical debridements.    03/10/21: Patient reports that he is soaking his leg twice a week in solution of water and bleach  as described above.  Pseudomonas continues to decrease with the use of bleach bath prior to wound dressing changes.  Patient does not have any periwound breakdown from continued bleach baths we will have him proceed with these prior to dressing changes to decrease Pseudomonas level.    -Excisional debridement of wounds in clinic today, medically necessary to promote wound healing.  -Patient to return to clinic weekly for assessment and debridement  -Home health to change dressing 2- 3 times per week in between clinic visits until we can establish a dry periwound environment without Pseudomonas. (This is improving)  -Patient is to continue to soak his left leg in a bleach bath for approximately 5 to 10 minutes prior to wound dressing changes.        Wound care: Hydrofiber silver,  abdominal pad, dry roll gauze, Hypafix tape, Tubigrip C double layer    2. Chronic venous stasis  Comments: Hemosiderin staining, brawny edema, scarring- findings consistent with CVI.  Pt established at [facility], underwent ablation procedures in 2019.     -Consider referral back to vascular if wounds failed to progress or deteriorate.    3. Acquired deformity of left ankle and foot  Comments: Charcot deformity of foot and ankle.  Very little ROM of ankle.  Patient was referred to orthopedics previously, however did not make appointment.  -Patient will need to follow-up with orthopedics once wounds are healed.    4.  Wound infection     03/10/21:   -Patient to continue bleach solution as described above for 5 to 10 minutes on each day of dressing changes  -Some residual pseudomonas to inferior wound bed however, significantly decreased and improving. Continue with bleach soaks    5.  Pain associated with wound    03/10/21:  -2% viscous lidocaine applied topically to wound bed for approximately 5 minutes prior to debridement  -Patient tolerated procedure today with no complaints of discomfort.      Please note that this note may have been  created using voice recognition software. I have worked with technical experts from [facility] to optimize the interface.  I have made every reasonable attempt to correct obvious errors, but there may be errors of grammar and possibly content that I did not discover before finalizing the note.    N

## 2021-03-10 NOTE — PATIENT INSTRUCTIONS
-Keep dressings clean and dry. Change dressings if they become over saturated, soiled or fall off.     -Avoid prolonged standing or sitting without elevating your legs.    -Remove your compression garments if you have severe pain, severe swelling, numbness, color change, or temperature change in your toes. If you need to remove your compression garments, do so by unrolling them. Do not cut the compression garments off, this is to prevent cutting yourself on accident.    -Should you experience any significant changes in your wound(s), such as signs of infection (redness, swelling, localized heat, increased pain, fever > 101 F, chills) or have any questions regarding your home care instructions, please contact the wound center at [phone]. If after hours, contact your primary care physician or go to the hospital emergency room.

## 2021-03-17 ENCOUNTER — OFFICE VISIT (OUTPATIENT)
Dept: WOUND CARE | Facility: MEDICAL CENTER | Age: 66
End: 2021-03-17
Attending: NURSE PRACTITIONER
Payer: MEDICARE

## 2021-03-17 VITALS
SYSTOLIC BLOOD PRESSURE: 118 MMHG | HEART RATE: 70 BPM | DIASTOLIC BLOOD PRESSURE: 78 MMHG | RESPIRATION RATE: 20 BRPM | TEMPERATURE: 98.4 F | OXYGEN SATURATION: 95 %

## 2021-03-17 DIAGNOSIS — L97.929 ULCER OF LEFT LOWER EXTREMITY, UNSPECIFIED ULCER STAGE (HCC): ICD-10-CM

## 2021-03-17 DIAGNOSIS — I87.8 CHRONIC VENOUS STASIS: ICD-10-CM

## 2021-03-17 DIAGNOSIS — R52 PAIN ASSOCIATED WITH WOUND: ICD-10-CM

## 2021-03-17 DIAGNOSIS — L97.929 VENOUS STASIS ULCER OF LEFT LOWER EXTREMITY (HCC): Primary | ICD-10-CM

## 2021-03-17 DIAGNOSIS — L97.921 ULCER OF LEFT LOWER EXTREMITY, LIMITED TO BREAKDOWN OF SKIN (HCC): ICD-10-CM

## 2021-03-17 DIAGNOSIS — I83.029 VENOUS STASIS ULCER OF LEFT LOWER EXTREMITY (HCC): Primary | ICD-10-CM

## 2021-03-17 DIAGNOSIS — L97.922 SKIN ULCER OF LEFT LOWER LEG WITH FAT LAYER EXPOSED (HCC): ICD-10-CM

## 2021-03-17 DIAGNOSIS — L08.9 WOUND INFECTION: ICD-10-CM

## 2021-03-17 DIAGNOSIS — T14.8XXA WOUND INFECTION: ICD-10-CM

## 2021-03-17 DIAGNOSIS — L97.929 VENOUS STASIS ULCER OF LEFT LOWER EXTREMITY (HCC): ICD-10-CM

## 2021-03-17 DIAGNOSIS — M21.962 ACQUIRED DEFORMITY OF LEFT ANKLE AND FOOT: ICD-10-CM

## 2021-03-17 DIAGNOSIS — T14.8XXA PAIN ASSOCIATED WITH WOUND: ICD-10-CM

## 2021-03-17 DIAGNOSIS — I83.029 VENOUS STASIS ULCER OF LEFT LOWER EXTREMITY (HCC): ICD-10-CM

## 2021-03-17 PROCEDURE — 99212 OFFICE O/P EST SF 10 MIN: CPT | Mod: 25 | Performed by: NURSE PRACTITIONER

## 2021-03-17 PROCEDURE — 11042 DBRDMT SUBQ TIS 1ST 20SQCM/<: CPT

## 2021-03-17 PROCEDURE — 11045 DBRDMT SUBQ TISS EACH ADDL: CPT

## 2021-03-17 PROCEDURE — 11042 DBRDMT SUBQ TIS 1ST 20SQCM/<: CPT | Performed by: NURSE PRACTITIONER

## 2021-03-17 PROCEDURE — 11045 DBRDMT SUBQ TISS EACH ADDL: CPT | Performed by: NURSE PRACTITIONER

## 2021-03-17 PROCEDURE — 99213 OFFICE O/P EST LOW 20 MIN: CPT | Mod: 25

## 2021-03-17 RX ORDER — MELOXICAM 7.5 MG/1
TABLET ORAL
Qty: 30 TABLET | Refills: 3 | Status: SHIPPED | OUTPATIENT
Start: 2021-03-17 | End: 2022-01-13

## 2021-03-17 RX ORDER — CIPROFLOXACIN 500 MG/1
500 TABLET, FILM COATED ORAL 2 TIMES DAILY
Qty: 28 TABLET | Refills: 0 | Status: SHIPPED | OUTPATIENT
Start: 2021-03-17 | End: 2021-03-31

## 2021-03-17 ASSESSMENT — ENCOUNTER SYMPTOMS
WHEEZING: 0
DIZZINESS: 0
COUGH: 0
SHORTNESS OF BREATH: 0
NAUSEA: 0
WEAKNESS: 0
PALPITATIONS: 0
VOMITING: 0
DEPRESSION: 1
HEADACHES: 0
CLAUDICATION: 0
CHILLS: 0
FEVER: 0
MYALGIAS: 0

## 2021-03-17 NOTE — PROGRESS NOTES
Provider Encounter- Full Thickness wound    HISTORY OF PRESENT ILLNESS  Wound History:    START OF CARE IN CLINIC: 5/20/2020               REFERRING PROVIDER: KEV Keith                 WOUND ETIOLOGY: venous,  likely mixed etiology              LOCATION: Left lower leg, multiple wounds                                   Right lower leg, multiple wounds-first observed in clinic on initial evaluation, 6/5/2020.  Resolved 9/2/2020                HISTORY: Patient is very well-known to this clinic from previous treatment of wounds to his left lower extremity.  He was discharged from the clinic in March 2020 due to full resolution of his wound.  He returned to the clinic approximately 3 weeks later, on 4/16, with cellulitis, edema, and large open wounds to his left lower leg, and was sent directly to [facility] emergency room.  He was admitted for IV antibiotics and surgical intervention.  On 4/22/2020, he underwent an I&D of his left lower extremity, fasciotomy, and VAC placement.  In the following weeks he underwent surgery 3 more times for irrigation and debridement.  He was discharged home on 5/21, on the VAC, with home health and a referral to [facility].       Pertinent Medical History:  Anxiety, Charcot's joint of left foot, non-diabetic (3/21/2016), Chronic congestive heart failure (HCC) (11/16/2017), Hepatitis C, chronic (HCC), Hypertension, Migraine, Polysubstance abuse (HCC) (3/8/2018), Tobacco use (4/18/2016), Ulcer of left lower extremity with necrosis of muscle (HCC) (3/21/2016), and Venous stasis ulcer ([facility]) (2017).                ETIOLOGY HISTORY:  Vascular Surgeon: Dr. White. Compression Circ-aid. Varicose Veins none visible     TOBACCO USE: Patient denies; patient also denies alcohol and recreational drug use    Patient's problem list, allergies, and current medications reviewed and updated in Epic    Interval History:  Interval History thinned 2/10/2021.  Please see previous notes for  complete interval history.       2/10/2021: Clinic visit with KEV Luna. Patient states that they are feeling well today.  Patient denies fever, chills, nausea, vomiting, lightheadedness, dizziness, shortness of breath and chest pain.  Patient with significant decrease in Pseudomonas to left lower extremity.  Patient is to finish his course of ciprofloxacin on 2/13/2021.  Patient is to continue with bleach soaks prior to wound dressing changes.  Continue with 3 times a week dressing changes.  Wound looks significantly better.    2/17/2021: Clinic visit with KEV Luna. Patient states that they are feeling well today.  Patient denies fever, chills, nausea, vomiting, lightheadedness, dizziness, shortness of breath and chest pain.  Wound bed and periwound area significantly improved we will continue with bleach soaks prior to wound dressing changes.  Pseudomonas has significantly declined.  Continue to monitor patient has finished ciprofloxacin.  Patient did have a significant buildup of slough to wound beds only.    2/24/2021: Clinic visit with KEV Luna. Patient states that they are feeling well today.  Patient denies fever, chills, nausea, vomiting, lightheadedness, dizziness, shortness of breath and chest pain. Patient reports that he is soaking his leg in a bleach water bath two times a week prior to wound care dressing changes.  Patient has had a significant improvement in wound size and periwound condition.  He is to continue with bleach water soak, will reevaluate next clinic visit.      3/3/2021: Clinic visit with KEV Luna. Patient states that they are feeling well today.  Patient denies fever, chills, nausea, vomiting, lightheadedness, dizziness, shortness of breath and chest pain.   Patient has decreased depth to wounds with improved granulation tissue growth.  Patient still has a significant amount of slough to the surface of all wound beds.  Patient is to  continue with bleach soaks of his left lower leg.  This is helping to keep the Pseudomonas level in check and allowing for wound bed progression.      3/10/2021: Clinic visit with KEV Luna. Patient states that they are feeling well today.  Patient denies fever, chills, nausea, vomiting, lightheadedness, dizziness, shortness of breath and chest pain.  Patient's wounds are slowly progressing.  Patient still has a significant amount of pseudomonal component to his wound beds.  However, this is significantly improved we will continue to use bleach baths to decrease pseudomonal component.     3/17/2021: Clinic visit with KEV Luna. Patient states that they are feeling well today.  Patient denies fever, chills, nausea, vomiting, lightheadedness, dizziness, shortness of breath and chest pain.  Patient's Pseudomonas level has increased with new wounds to the anterior aspect of his leg.  I will order the patient Cipro to help treat the left lower extremity Pseudomonas.  Patient to continue with bleach baths.      REVIEW OF SYSTEMS:   Review of Systems   Constitutional: Negative for chills and fever.        States he is eating well, appetite normal   Respiratory: Negative for cough, shortness of breath and wheezing.    Cardiovascular: Positive for leg swelling. Negative for chest pain, palpitations and claudication.        Chronic swelling in legs, for several years   Gastrointestinal: Negative for nausea and vomiting.   Musculoskeletal: Positive for joint pain. Negative for myalgias.        Chronic   Neurological: Negative for dizziness, weakness and headaches.   Psychiatric/Behavioral: Positive for depression.       PHYSICAL EXAMINATION:   /78   Pulse 70   Temp 36.9 °C (98.4 °F) (Temporal)   Resp 20   SpO2 95%     Physical Exam   Constitutional: He is oriented to person, place, and time and well-developed, well-nourished, and in no distress. No distress.   HENT:   Head: Normocephalic and  atraumatic.   Eyes: Pupils are equal, round, and reactive to light. Conjunctivae and EOM are normal.   Cardiovascular: Intact distal pulses.   Pulmonary/Chest: Effort normal. No respiratory distress. He has no wheezes.   Musculoskeletal:         General: Deformity present. No edema. Normal range of motion.      Cervical back: Neck supple.      Comments: Left charcot foot and ankle     Neurological: He is alert and oriented to person, place, and time.   Skin: Skin is warm. He is not diaphoretic.   Large full-thickness wounds to lower extremities  Significant increase in Pseudomonas to left lower extremity with degradation of wound beds.     Psychiatric: Mood, affect and judgment normal.                                                         WOUND ASSESSMENT           Wound 06/05/20 Full Thickness Wound Leg Left --Left Lateral LE (Active)   Wound Image    03/17/21 1344   Site Assessment Yellow;Tan;Pink 03/17/21 1344   Periwound Assessment Edema;Fragile 03/17/21 1344   Margins Attached edges 03/17/21 1344   Closure Secondary intention 01/20/21 1300   Drainage Amount Large 03/17/21 1344   Drainage Description Serosanguineous 03/17/21 1344   Treatments Cleansed;Topical Lidocaine;Provider debridement 03/17/21 1344   Wound Cleansing Puracyn [place] 03/17/21 1344   Periwound Protectant Skin Moisturizer;Barrier Paste 03/17/21 1344   Dressing Cleansing/Solutions Not Applicable 03/17/21 1344   Dressing Options Hydrofiber Silver;Absorbent Abdominal Pad;Dry Roll Gauze;Hypafix Tape;Tubigrip 03/17/21 1344   Dressing Changed Changed 02/24/21 1400   Dressing Status Clean;Dry;Intact 02/24/21 1400   Dressing Change/Treatment Frequency Every 48 hrs, and As Needed 01/20/21 1300   Non-staged Wound Description Full thickness 03/17/21 1344   Wound Length (cm) 16 cm 03/17/21 1344   Wound Width (cm) 5.5 cm 03/17/21 1344   Wound Depth (cm) 0.1 cm 03/17/21 1344   Wound Surface Area (cm^2) 88 cm^2 03/17/21 1344   Wound Volume (cm^3) 8.8 cm^3  03/17/21 1344   Post-Procedure Length (cm) 16.5 cm 03/17/21 1344   Post-Procedure Width (cm) 5.6 cm 03/17/21 1344   Post-Procedure Depth (cm) 0.1 cm 03/17/21 1344   Post-Procedure Surface Area (cm^2) 92.4 cm^2 03/17/21 1344   Post-Procedure Volume (cm^3) 9.24 cm^3 03/17/21 1344   Wound Healing % 84 03/17/21 1344   Wound Bed Granulation (%) 10 % 02/10/21 1300   Wound Bed Epithelium (%) 90 % 02/10/21 1300   Wound Bed Slough (%) 0 % 02/10/21 1300   Wound Bed Eschar (%) 0 % 01/13/21 1345   Tunneling (cm) 0 cm 03/17/21 1344   Undermining (cm) 0 cm 03/17/21 1344   Wound Odor None 03/17/21 1344   Exposed Structures None 03/17/21 1344       Wound 01/20/21 -Left Medial/Posterior/Lateral LE (Active)   Wound Image    03/17/21 1344   Site Assessment Yellow;Tan 03/17/21 1344   Periwound Assessment Edema;Fragile 03/17/21 1344   Margins Unattached edges 03/17/21 1344   Closure Secondary intention 02/24/21 1400   Drainage Amount [place] 03/17/21 1344   Drainage Description Serosanguineous;Tan 03/17/21 1344   Treatments Cleansed;Topical Lidocaine;Provider debridement 03/17/21 1344   Wound Cleansing Puracyn [place] 03/17/21 1344   Periwound Protectant Skin Moisturizer;Barrier Paste 03/17/21 1344   Dressing Cleansing/Solutions Not Applicable 03/17/21 1344   Dressing Options Hydrofiber Silver;Absorbent Abdominal Pad;Dry Roll Gauze;Hypafix Tape;Tubigrip 03/17/21 1344   Dressing Changed Changed 02/24/21 1400   Dressing Status Clean;Dry;Intact 02/24/21 1400   Non-staged Wound Description Full thickness 03/17/21 1344   Wound Length (cm) 12.6 cm 03/17/21 1344   Wound Width (cm) 13 cm 03/17/21 1344   Wound Depth (cm) 0.2 cm 03/17/21 1344   Wound Surface Area (cm^2) 163.8 cm^2 03/17/21 1344   Wound Volume (cm^3) 32.76 cm^3 03/17/21 1344   Post-Procedure Length (cm) 12.7 cm 03/17/21 1344   Post-Procedure Width (cm) 13.2 cm 03/17/21 1344   Post-Procedure Depth (cm) 0.4 cm 03/17/21 1344   Post-Procedure Surface Area (cm^2) 167.64 cm^2 03/17/21  1344   Post-Procedure Volume (cm^3) 67.06 cm^3 03/17/21 1344   Wound Healing % -2749 03/17/21 1344   Wound Bed Granulation (%) 30 % 02/10/21 1300   Wound Bed Epithelium (%) 0 % 02/10/21 1300   Wound Bed Slough (%) 85 % 03/17/21 1344   Wound Bed Eschar (%) 0 % 02/10/21 1300   Tunneling (cm) 0 cm 03/17/21 1344   Undermining (cm) 0 cm 03/17/21 1344   Wound Odor None 03/17/21 1344   Exposed Structures None 03/17/21 1344       Wound 02/24/21 Left medial Ankle (Active)   Wound Image    03/17/21 1344   Site Assessment Tan;Yellow;Pink 03/17/21 1344   Periwound Assessment Edema;Fragile 03/17/21 1344   Margins Unattached edges 03/17/21 1344   Closure Secondary intention 02/24/21 1400   Drainage Amount Large 03/17/21 1344   Drainage Description Serosanguineous;Tan 03/17/21 1344   Treatments Cleansed;Topical Lidocaine;Provider debridement 03/17/21 1344   Wound Cleansing Puracyn [place] 03/17/21 1344   Periwound Protectant Skin Moisturizer;Barrier Paste 03/17/21 1344   Dressing Cleansing/Solutions Not Applicable 03/17/21 1344   Dressing Options Hydrofiber Silver;Absorbent Abdominal Pad;Dry Roll Gauze;Hypafix Tape;Tubigrip 03/17/21 1344   Dressing Changed Changed 02/24/21 1400   Dressing Status Clean;Dry;Intact 02/24/21 1400   Non-staged Wound Description Full thickness 03/17/21 1344   Wound Length (cm) 6.4 cm 03/17/21 1344   Wound Width (cm) 1.9 cm 03/17/21 1344   Wound Depth (cm) 0.2 cm 03/17/21 1344   Wound Surface Area (cm^2) 12.16 cm^2 03/17/21 1344   Wound Volume (cm^3) 2.43 cm^3 03/17/21 1344   Post-Procedure Length (cm) 6.6 cm 03/17/21 1344   Post-Procedure Width (cm) 2.5 cm 03/17/21 1344   Post-Procedure Depth (cm) 0.3 cm 03/17/21 1344   Post-Procedure Surface Area (cm^2) 16.5 cm^2 03/17/21 1344   Post-Procedure Volume (cm^3) 4.95 cm^3 03/17/21 1344   Wound Healing % 30 03/17/21 1344   Tunneling (cm) 0 cm 03/17/21 1344   Undermining (cm) 0 cm 03/17/21 1344   Wound Odor Foul 03/17/21 1344   Exposed Structures None 03/17/21  1344       Wound 03/17/21 Left anterior ankle cluster (Active)   Wound Image   03/17/21 1344   Site Assessment Yellow;[place] 03/17/21 1344   Periwound Assessment Fragile;Edema 03/17/21 1344   Margins Attached edges;Undefined edges 03/17/21 1344   Drainage Amount Large 03/17/21 1344   Drainage Description Serosanguineous;Tan 03/17/21 1344   Treatments Cleansed;Topical Lidocaine;Provider debridement 03/17/21 1344   Wound Cleansing Puracyn [place] 03/17/21 1344   Periwound Protectant Skin Moisturizer;Barrier Paste 03/17/21 1344   Dressing Cleansing/Solutions Not Applicable 03/17/21 1344   Dressing Options Hydrofiber Silver;Absorbent Abdominal Pad;Dry Roll Gauze;Hypafix Tape;Tubigrip 03/17/21 1344   Non-staged Wound Description Full thickness 03/17/21 1344   Post-Procedure Length (cm) 7 cm 03/17/21 1344   Post-Procedure Width (cm) 11 cm 03/17/21 1344   Post-Procedure Depth (cm) 0.2 cm 03/17/21 1344   Post-Procedure Surface Area (cm^2) 77 cm^2 03/17/21 1344   Post-Procedure Volume (cm^3) 15.4 cm^3 03/17/21 1344   Wound Bed Slough (%) 100 % 03/17/21 1344   Wound Bed Slough % - (Post-Procedure) 0 % 03/17/21 1344   Tunneling (cm) 0 cm 03/17/21 1344   Undermining (cm) 0 cm 03/17/21 1344   Wound Odor Foul 03/17/21 1344   Exposed Structures None 03/17/21 1344       PROCEDURE: Excisional debridement of left lower extremity wounds   -2% viscous lidocaine applied topically to wound beds for approximately 10 minutes prior to debridement  -Curette used to excise thick layer of slough from wound beds.  Excisional debridement was performed to remove devitalized tissue from all wounds until healthy, bleeding tissue was visualized.  Total area debrided approximately (100% accuracy of wound measurement difficult due to the diffuse nature of the wounds) 200 cm² tissue debrided into the subcutaneous layer of all wounds.    -Bleeding controlled with manual pressure.    -Wounds cleansed with normal saline  -Patient tolerated the procedure well,  without c/o pain or discomfort.         Pertinent Labs and Diagnostics:    Labs:     A1c:   Lab Results   Component Value Date/Time    HBA1C 5.7 (H) 2020 11:22 AM      Imagin/3/2020-MRI left tibia-fibula with and without contrast  IMPRESSION:     Moderate lateral leg cellulitis and underlying myositis     No abscess or osteomyelitis    VASCULAR STUDIES: BHUPINDER 2019   Right.    Doppler waveform of the common femoral artery is of high amplitude and    triphasic.    Doppler waveforms at the ankle are brisk and triphasic.    Ankle-brachial index is normal.       Left.    Could not perform ankle-brachial index due to large blisters/ulcers.   Normal toe-brachial index: 0.94   Doppler waveform of the common femoral artery is of high amplitude and    triphasic.    Biphasic waveforms seen at the ankle.     WOUND CULTURE:    2020: Culture collected in clinic  Culture Result Abnormal   Staphylococcus aureus   Light growth     Culture Result Abnormal   Pseudomonas aeruginosa   Light growth     Culture Result Abnormal   Enterococcus faecalis   Light growth              ASSESSMENT AND PLAN:     1. Skin ulcer of left lower leg with fat layer exposed (HCC)  Comment: Patient has history of recurring ulcerations to left lower extremity.  Has undergone ablation procedures by Dr. White.  Discharged from [facility] in 2020 due to healing, returned approximately 3 weeks later much worse.  Hospitalized from  until 2020 underwent multiple surgical debridements.    21: Patient reports that he is soaking his leg twice a week in solution of water and bleach as described above.  Unfortunately Pseudomonas has increased to left lower extremity.    -Excisional debridement of wounds in clinic today, medically necessary to promote wound healing.  -Patient to return to clinic weekly for assessment and debridement  -Home health to change dressing 2- 3 times per week in between clinic visits until we can establish  a dry periwound environment without Pseudomonas.  Status of patient's left lower extremity wound beds have declined with increased Pseudomonas.  -Patient is to continue to soak his left leg in a bleach bath for approximately 5 to 10 minutes prior to wound dressing changes.        Wound care: Hydrofiber silver, as of abdominal pad, dry roll gauze, Hypafix tape, Tubigrip D double layer    2. Chronic venous stasis  Comments: Hemosiderin staining, brawny edema, scarring- findings consistent with CVI.  Pt established at [facility], underwent ablation procedures in 2019.     -Consider referral back to vascular if wounds failed to progress or deteriorate.    3. Acquired deformity of left ankle and foot  Comments: Charcot deformity of foot and ankle.  Very little ROM of ankle.  Patient was referred to orthopedics previously, however did not make appointment.  -Patient will need to follow-up with orthopedics once wounds are healed.    4.  Wound infection     03/17/21:   -Patient to continue bleach solution as described above for 5 to 10 minutes on each day of dressing changes  -Some residual pseudomonas to inferior wound bed however, significantly decreased and improving. Continue with bleach soaks  -I have ordered the patient a 14-day course of ciprofloxacin to treat pseudomonal infection.    5.  Pain associated with wound    03/17/21:  -2% viscous lidocaine applied topically to wound bed for approximately 5 minutes prior to debridement  -Patient tolerated procedure today with no complaints of discomfort.    > 10 min spent face to face with patient, >50% of time spent counseling, coordinating care, reviewing records, discussing POC, educating patient.  Time spent in excess of procedure time.      Please note that this note may have been created using voice recognition software. I have worked with technical experts from [employer] to optimize the interface.  I have made every reasonable attempt to correct obvious errors,  but there may be errors of grammar and possibly content that I did not discover before finalizing the note.    N

## 2021-03-17 NOTE — PROGRESS NOTES
Home health orders faxed to [facility] #[phone].    APRN prescribed patient with antibiotics this visit. Patient instructed to begin taking antibiotics as soon as possible, to take full course of antibiotics as prescribed. Patient verbalizes understanding and is in agreement with plan.

## 2021-03-17 NOTE — TELEPHONE ENCOUNTER
Received request via: Pharmacy    Was the patient seen in the last year in this department? Yes    Does the patient have an active prescription (recently filled or refills available) for medication(s) requested? No   Future Appointments       Provider Department Center    3/17/2021 1:30 PM [facility] [address] [place] [address]    3/24/2021 1:00 PM [facility] [address] [place] [address]    3/31/2021 1:30 PM [facility] [address] [place] [address]    4/7/2021 1:00 PM [facility] [address] [place] [address]    4/14/2021 1:00 PM [facility] [address] [place] [address]    4/21/2021 1:00 PM [facility] [address] [place] [address]    4/28/2021 1:00 PM [facility] [address] [place] [address]

## 2021-03-18 ENCOUNTER — TELEPHONE (OUTPATIENT)
Dept: MEDICAL GROUP | Facility: MEDICAL CENTER | Age: 66
End: 2021-03-18

## 2021-03-18 NOTE — TELEPHONE ENCOUNTER
DOCUMENTATION OF PAR STATUS:    1. Name of Medication & Dose: Mobic 7.5MG tablets       2. Name of Prescription Coverage Company & phone #: MEDICARE    3. Date Prior Auth Submitted: 3/18/2021    4. What information was given to obtain insurance decision? [id number]     5. Prior Auth Status? Pending    6. Patient Notified: N\A

## 2021-03-24 ENCOUNTER — OFFICE VISIT (OUTPATIENT)
Dept: WOUND CARE | Facility: MEDICAL CENTER | Age: 66
End: 2021-03-24
Attending: NURSE PRACTITIONER
Payer: MEDICARE

## 2021-03-24 VITALS
SYSTOLIC BLOOD PRESSURE: 151 MMHG | TEMPERATURE: 97.4 F | HEART RATE: 95 BPM | RESPIRATION RATE: 20 BRPM | DIASTOLIC BLOOD PRESSURE: 98 MMHG | OXYGEN SATURATION: 96 %

## 2021-03-24 DIAGNOSIS — M21.962 ACQUIRED DEFORMITY OF LEFT ANKLE AND FOOT: ICD-10-CM

## 2021-03-24 DIAGNOSIS — R52 PAIN ASSOCIATED WITH WOUND: ICD-10-CM

## 2021-03-24 DIAGNOSIS — L97.922 SKIN ULCER OF LEFT LOWER LEG WITH FAT LAYER EXPOSED (HCC): ICD-10-CM

## 2021-03-24 DIAGNOSIS — T14.8XXA WOUND INFECTION: ICD-10-CM

## 2021-03-24 DIAGNOSIS — T14.8XXA PAIN ASSOCIATED WITH WOUND: ICD-10-CM

## 2021-03-24 DIAGNOSIS — L08.9 WOUND INFECTION: ICD-10-CM

## 2021-03-24 DIAGNOSIS — I87.8 CHRONIC VENOUS STASIS: Primary | ICD-10-CM

## 2021-03-24 PROCEDURE — 99213 OFFICE O/P EST LOW 20 MIN: CPT | Mod: 25 | Performed by: NURSE PRACTITIONER

## 2021-03-24 PROCEDURE — 11042 DBRDMT SUBQ TIS 1ST 20SQCM/<: CPT | Performed by: NURSE PRACTITIONER

## 2021-03-24 PROCEDURE — 99213 OFFICE O/P EST LOW 20 MIN: CPT | Mod: 25

## 2021-03-24 PROCEDURE — 11045 DBRDMT SUBQ TISS EACH ADDL: CPT

## 2021-03-24 PROCEDURE — 11042 DBRDMT SUBQ TIS 1ST 20SQCM/<: CPT

## 2021-03-24 PROCEDURE — 11045 DBRDMT SUBQ TISS EACH ADDL: CPT | Performed by: NURSE PRACTITIONER

## 2021-03-24 ASSESSMENT — ENCOUNTER SYMPTOMS
SHORTNESS OF BREATH: 0
NAUSEA: 0
CHILLS: 0
COUGH: 0
FEVER: 0
VOMITING: 0
DEPRESSION: 1
DIZZINESS: 0
HEADACHES: 0
PALPITATIONS: 0
WHEEZING: 0
CLAUDICATION: 0
WEAKNESS: 0
MYALGIAS: 0

## 2021-03-24 ASSESSMENT — PAIN SCALES - GENERAL: PAINLEVEL: 7=MODERATE-SEVERE PAIN

## 2021-03-24 NOTE — PROGRESS NOTES
Provider Encounter- Full Thickness wound    HISTORY OF PRESENT ILLNESS  Wound History:    START OF CARE IN CLINIC: 5/20/2020               REFERRING PROVIDER: KEV Keith                 WOUND ETIOLOGY: venous,  likely mixed etiology              LOCATION: Left lower leg, multiple wounds                                   Right lower leg, multiple wounds-first observed in clinic on initial evaluation, 6/5/2020.  Resolved 9/2/2020                HISTORY: Patient is very well-known to this clinic from previous treatment of wounds to his left lower extremity.  He was discharged from the clinic in March 2020 due to full resolution of his wound.  He returned to the clinic approximately 3 weeks later, on 4/16, with cellulitis, edema, and large open wounds to his left lower leg, and was sent directly to [facility] emergency room.  He was admitted for IV antibiotics and surgical intervention.  On 4/22/2020, he underwent an I&D of his left lower extremity, fasciotomy, and VAC placement.  In the following weeks he underwent surgery 3 more times for irrigation and debridement.  He was discharged home on 5/21, on the VAC, with home health and a referral to [facility].       Pertinent Medical History:  Anxiety, Charcot's joint of left foot, non-diabetic (3/21/2016), Chronic congestive heart failure (HCC) (11/16/2017), Hepatitis C, chronic (HCC), Hypertension, Migraine, Polysubstance abuse (HCC) (3/8/2018), Tobacco use (4/18/2016), Ulcer of left lower extremity with necrosis of muscle (HCC) (3/21/2016), and Venous stasis ulcer ([facility]) (2017).                ETIOLOGY HISTORY:  Vascular Surgeon: Dr. White. Compression Circ-aid. Varicose Veins none visible     TOBACCO USE: Patient denies; patient also denies alcohol and recreational drug use    Patient's problem list, allergies, and current medications reviewed and updated in Epic    Interval History:  Interval History thinned 2/10/2021.  Please see previous notes for  complete interval history.       2/10/2021: Clinic visit with KEV Luna. Patient states that they are feeling well today.  Patient denies fever, chills, nausea, vomiting, lightheadedness, dizziness, shortness of breath and chest pain.  Patient with significant decrease in Pseudomonas to left lower extremity.  Patient is to finish his course of ciprofloxacin on 2/13/2021.  Patient is to continue with bleach soaks prior to wound dressing changes.  Continue with 3 times a week dressing changes.  Wound looks significantly better.    2/17/2021: Clinic visit with KEV Luna. Patient states that they are feeling well today.  Patient denies fever, chills, nausea, vomiting, lightheadedness, dizziness, shortness of breath and chest pain.  Wound bed and periwound area significantly improved we will continue with bleach soaks prior to wound dressing changes.  Pseudomonas has significantly declined.  Continue to monitor patient has finished ciprofloxacin.  Patient did have a significant buildup of slough to wound beds only.    2/24/2021: Clinic visit with KEV Luna. Patient states that they are feeling well today.  Patient denies fever, chills, nausea, vomiting, lightheadedness, dizziness, shortness of breath and chest pain. Patient reports that he is soaking his leg in a bleach water bath two times a week prior to wound care dressing changes.  Patient has had a significant improvement in wound size and periwound condition.  He is to continue with bleach water soak, will reevaluate next clinic visit.      3/3/2021: Clinic visit with KEV Luna. Patient states that they are feeling well today.  Patient denies fever, chills, nausea, vomiting, lightheadedness, dizziness, shortness of breath and chest pain.   Patient has decreased depth to wounds with improved granulation tissue growth.  Patient still has a significant amount of slough to the surface of all wound beds.  Patient is to  continue with bleach soaks of his left lower leg.  This is helping to keep the Pseudomonas level in check and allowing for wound bed progression.      3/10/2021: Clinic visit with KEV Luna. Patient states that they are feeling well today.  Patient denies fever, chills, nausea, vomiting, lightheadedness, dizziness, shortness of breath and chest pain.  Patient's wounds are slowly progressing.  Patient still has a significant amount of pseudomonal component to his wound beds.  However, this is significantly improved we will continue to use bleach baths to decrease pseudomonal component.     3/17/2021: Clinic visit with KEV Luna. Patient states that they are feeling well today.  Patient denies fever, chills, nausea, vomiting, lightheadedness, dizziness, shortness of breath and chest pain.  Patient's Pseudomonas level has increased with new wounds to the anterior aspect of his leg.  I will order the patient Cipro to help treat the left lower extremity Pseudomonas.  Patient to continue with bleach baths.      3/24/2021 : Clinic visit with KEV Brantley, FNP-BC, CWOCN, CFCN.  Goran states he is feeling well, denies fevers, chills, nausea, vomiting, cough or shortness of breath.  He has continued to do bleach baths to his lower leg on the days he has home health.  Otherwise he is changing wraps as needed in between home health and clinic visits.  Told me today that he is drinking aloe vera because he was told it would help with wound healing.    REVIEW OF SYSTEMS:   Review of Systems   Constitutional: Negative for chills and fever.        States he is eating well, appetite normal   Respiratory: Negative for cough, shortness of breath and wheezing.    Cardiovascular: Positive for leg swelling. Negative for chest pain, palpitations and claudication.        Chronic swelling in legs, for several years   Gastrointestinal: Negative for nausea and vomiting.   Musculoskeletal: Positive for joint  pain. Negative for myalgias.        Chronic   Neurological: Negative for dizziness, weakness and headaches.   Psychiatric/Behavioral: Positive for depression.       PHYSICAL EXAMINATION:   /98   Pulse 95   Temp 36.3 °C (97.4 °F) (Temporal)   Resp 20   SpO2 96%     Physical Exam   Constitutional: He is oriented to person, place, and time and well-developed, well-nourished, and in no distress. No distress.   HENT:   Head: Normocephalic and atraumatic.   Eyes: Pupils are equal, round, and reactive to light. Conjunctivae and EOM are normal.   Cardiovascular: Intact distal pulses.   Pulmonary/Chest: Effort normal. No respiratory distress. He has no wheezes.   Musculoskeletal:         General: Deformity present. No edema. Normal range of motion.      Cervical back: Neck supple.      Comments: Left charcot foot and ankle     Neurological: He is alert and oriented to person, place, and time.   Skin: Skin is warm. He is not diaphoretic.   Large full-thickness wounds to lower extremities  Significant increase in Pseudomonas to left lower extremity with degradation of wound beds.     Psychiatric: Mood, affect and judgment normal.                                                         WOUND ASSESSMENT       Wound 06/05/20 Full Thickness Wound Leg Left --Left Lateral LE (Active)   Wound Image    03/24/21 1400   Site Assessment Yellow;Tan;[place] 03/24/21 1400   Periwound Assessment Scar tissue;Maceration 03/24/21 1400   Margins Attached edges 03/24/21 1400   Closure Secondary intention 01/20/21 1300   Drainage Amount Copious 03/24/21 1400   Drainage Description Serosanguineous 03/24/21 1400   Treatments Cleansed;Topical Lidocaine;Provider debridement 03/24/21 1400   Wound Cleansing Puracyn [place] 03/24/21 1400   Periwound Protectant Barrier Paste 03/24/21 1400   Dressing Cleansing/Solutions Not Applicable 03/17/21 1344   Dressing Options Hydrofiber Silver;Absorbent Abdominal Pad;Dry Roll Gauze;Tubigrip 03/24/21 1400    Dressing Changed Changed 02/24/21 1400   Dressing Status Clean;Dry;Intact 02/24/21 1400   Dressing Change/Treatment Frequency Every 48 hrs, and As Needed 01/20/21 1300   Non-staged Wound Description Full thickness 03/24/21 1400   Wound Length (cm) 16.7 cm 03/24/21 1400   Wound Width (cm) 5.7 cm 03/24/21 1400   Wound Depth (cm) 0.2 cm 03/24/21 1400   Wound Surface Area (cm^2) 95.19 cm^2 03/24/21 1400   Wound Volume (cm^3) 19.04 cm^3 03/24/21 1400   Post-Procedure Length (cm) 16.8 cm 03/24/21 1400   Post-Procedure Width (cm) 5.8 cm 03/24/21 1400   Post-Procedure Depth (cm) 0.3 cm 03/24/21 1400   Post-Procedure Surface Area (cm^2) 97.44 cm^2 03/24/21 1400   Post-Procedure Volume (cm^3) 29.23 cm^3 03/24/21 1400   Wound Healing % 65 03/24/21 1400   Wound Bed Granulation (%) 10 % 02/10/21 1300   Wound Bed Epithelium (%) 90 % 02/10/21 1300   Wound Bed Slough (%) 0 % 02/10/21 1300   Wound Bed Eschar (%) 0 % 01/13/21 1345   Tunneling (cm) 0 cm 03/24/21 1400   Undermining (cm) 0 cm 03/24/21 1400   Wound Odor Foul 03/24/21 1400   Exposed Structures None 03/24/21 1400       Wound 01/20/21 -Left Medial/Posterior/Lateral LE (Active)   Wound Image    03/24/21 1400   Site Assessment Yellow;Pink;Red 03/24/21 1400   Periwound Assessment Scar tissue;Maceration 03/24/21 1400   Margins Unattached edges 03/24/21 1400   Closure Secondary intention 02/24/21 1400   Drainage Amount Copious 03/24/21 1400   Drainage Description Serosanguineous 03/24/21 1400   Treatments Cleansed;Topical Lidocaine;Provider debridement 03/24/21 1400   Wound Cleansing Puracyn [place] 03/24/21 1400   Periwound Protectant Barrier Paste 03/24/21 1400   Dressing Cleansing/Solutions Not Applicable 03/17/21 1344   Dressing Options Hydrofiber Silver;Absorbent Abdominal Pad;Dry Roll Gauze;Tubigrip 03/24/21 1400   Dressing Changed Changed 02/24/21 1400   Dressing Status Clean;Dry;Intact 02/24/21 1400   Non-staged Wound Description Full thickness 03/24/21 1400   Wound  Length (cm) 12.5 cm 03/24/21 1400   Wound Width (cm) 13 cm 03/24/21 1400   Wound Depth (cm) 0.4 cm 03/24/21 1400   Wound Surface Area (cm^2) 162.5 cm^2 03/24/21 1400   Wound Volume (cm^3) 65 cm^3 03/24/21 1400   Post-Procedure Length (cm) 12.8 cm 03/24/21 1400   Post-Procedure Width (cm) 13 cm 03/24/21 1400   Post-Procedure Depth (cm) 0.4 cm 03/24/21 1400   Post-Procedure Surface Area (cm^2) 166.4 cm^2 03/24/21 1400   Post-Procedure Volume (cm^3) 66.56 cm^3 03/24/21 1400   Wound Healing % -5552 03/24/21 1400   Wound Bed Granulation (%) 30 % 02/10/21 1300   Wound Bed Epithelium (%) 0 % 02/10/21 1300   Wound Bed Slough (%) 85 % 03/17/21 1344   Wound Bed Eschar (%) 0 % 02/10/21 1300   Tunneling (cm) 0 cm 03/24/21 1400   Undermining (cm) 0 cm 03/24/21 1400   Wound Odor Foul 03/24/21 1400   Exposed Structures None 03/24/21 1400       Wound 02/24/21 Left medial Ankle (Active)   Wound Image    03/24/21 1400   Site Assessment Pink;Yellow 03/24/21 1400   Periwound Assessment Scar tissue;Maceration 03/24/21 1400   Margins Unattached edges 03/24/21 1400   Closure Secondary intention 02/24/21 1400   Drainage Amount Large 03/24/21 1400   Drainage Description Serosanguineous 03/24/21 1400   Treatments Cleansed;Topical Lidocaine;Provider debridement 03/24/21 1400   Wound Cleansing Puracyn [place] 03/24/21 1400   Periwound Protectant Skin Moisturizer;Barrier Paste 03/17/21 1344   Dressing Cleansing/Solutions Not Applicable 03/17/21 1344   Dressing Options Hydrofiber Silver;Absorbent Abdominal Pad;Dry Roll Gauze;Tubigrip 03/24/21 1400   Dressing Changed Changed 02/24/21 1400   Dressing Status Clean;Dry;Intact 02/24/21 1400   Non-staged Wound Description Full thickness 03/24/21 1400   Wound Length (cm) 6.7 cm 03/24/21 1400   Wound Width (cm) 2.6 cm 03/24/21 1400   Wound Depth (cm) 0.3 cm 03/24/21 1400   Wound Surface Area (cm^2) 17.42 cm^2 03/24/21 1400   Wound Volume (cm^3) 5.23 cm^3 03/24/21 1400   Post-Procedure Length (cm) 6.8 cm  03/24/21 1400   Post-Procedure Width (cm) 2.7 cm 03/24/21 1400   Post-Procedure Depth (cm) 0.3 cm 03/24/21 1400   Post-Procedure Surface Area (cm^2) 18.36 cm^2 03/24/21 1400   Post-Procedure Volume (cm^3) 5.51 cm^3 03/24/21 1400   Wound Healing % -50 03/24/21 1400   Tunneling (cm) 0 cm 03/24/21 1400   Undermining (cm) 0 cm 03/24/21 1400   Wound Odor Foul 03/24/21 1400   Exposed Structures None 03/24/21 1400       Wound 03/17/21 Left anterior ankle cluster (Active)   Wound Image    03/24/21 1400   Site Assessment Pink;Yellow 03/24/21 1400   Periwound Assessment Scar tissue;Pale 03/24/21 1400   Margins Attached edges 03/24/21 1400   Drainage Amount Copious 03/24/21 1400   Drainage Description Serosanguineous 03/24/21 1400   Treatments Cleansed;Topical Lidocaine;Provider debridement 03/24/21 1400   Wound Cleansing Puracyn [place] 03/24/21 1400   Periwound Protectant Barrier Paste 03/24/21 1400   Dressing Cleansing/Solutions Not Applicable 03/17/21 1344   Dressing Options Hydrofiber Silver;Absorbent Abdominal Pad;Dry Roll Gauze;Hypafix Tape;Tubigrip 03/24/21 1400   Non-staged Wound Description Full thickness 03/24/21 1400   Wound Length (cm) 6.5 cm 03/24/21 1400   Wound Width (cm) 10 cm 03/24/21 1400   Wound Depth (cm) 0.3 cm 03/24/21 1400   Wound Surface Area (cm^2) 65 cm^2 03/24/21 1400   Wound Volume (cm^3) 19.5 cm^3 03/24/21 1400   Post-Procedure Length (cm) 6.5 cm 03/24/21 1400   Post-Procedure Width (cm) 10.2 cm 03/24/21 1400   Post-Procedure Depth (cm) 0.3 cm 03/24/21 1400   Post-Procedure Surface Area (cm^2) 66.3 cm^2 03/24/21 1400   Post-Procedure Volume (cm^3) 19.89 cm^3 03/24/21 1400   Wound Bed Slough (%) 100 % 03/17/21 1344   Wound Bed Slough % - (Post-Procedure) 0 % 03/17/21 1344   Tunneling (cm) 0 cm 03/24/21 1400   Undermining (cm) 0 cm 03/24/21 1400   Wound Odor Foul 03/24/21 1400   Exposed Structures None 03/24/21 1400               PROCEDURE: Excisional debridement of left lower extremity wounds   -2%  viscous lidocaine applied topically to wound beds for approximately 10 minutes prior to debridement  -Curette used to excise thick layer of slough from wound beds.  Excisional debridement was performed to remove devitalized tissue from all wounds until healthy, bleeding tissue was visualized.  Total area debrided approximately (100% accuracy of wound measurement difficult due to the diffuse nature of the wounds) 200 cm² tissue debrided into the subcutaneous layer of all wounds.    -Bleeding controlled with manual pressure.    -Wounds cleansed with normal saline  -Patient tolerated the procedure well, without c/o pain or discomfort.         Pertinent Labs and Diagnostics:    Labs:     A1c:   Lab Results   Component Value Date/Time    HBA1C 5.7 (H) 2020 11:22 AM      Imagin/3/2020-MRI left tibia-fibula with and without contrast  IMPRESSION:     Moderate lateral leg cellulitis and underlying myositis     No abscess or osteomyelitis    VASCULAR STUDIES: BHUPINDER 2019   Right.    Doppler waveform of the common femoral artery is of high amplitude and    triphasic.    Doppler waveforms at the ankle are brisk and triphasic.    Ankle-brachial index is normal.       Left.    Could not perform ankle-brachial index due to large blisters/ulcers.   Normal toe-brachial index: 0.94   Doppler waveform of the common femoral artery is of high amplitude and    triphasic.    Biphasic waveforms seen at the ankle.     WOUND CULTURE:    2020: Culture collected in clinic  Culture Result Abnormal   Staphylococcus aureus   Light growth     Culture Result Abnormal   Pseudomonas aeruginosa   Light growth     Culture Result Abnormal   Enterococcus faecalis   Light growth              ASSESSMENT AND PLAN:     1. Skin ulcer of left lower leg with fat layer exposed (HCC)  Comment: Patient has history of recurring ulcerations to left lower extremity.  Has undergone ablation procedures by Dr. White.  Discharged from [facility] in March  2020 due to healing, returned approximately 3 weeks later much worse.  Hospitalized from 4/16 until 5/21/2020 underwent multiple surgical debridements.    03/24/21: Patient's wounds are stalled, actually appear to be deteriorating.  E    -Excisional debridement of wounds in clinic today, medically necessary to promote wound healing.  However, due to thick, fibrinous nature of tissue unable to get to clean wound beds.  Tissue is very gristle like, with poor vasculature.  -Patient to return to clinic weekly for assessment and debridement  -Home health to change dressing 2- 3 times per week in between clinic visits until we can establish a dry periwound environment without Pseudomonas.  Status of patient's left lower extremity wound beds have declined with increased Pseudomonas.  Will investigate other topical agents which may be effective against Pseudomonas.  -Patient is to continue to soak his left leg in a bleach bath for approximately 5 to 10 minutes prior to wound dressing changes.        Wound care: Hydrofiber silver, as of abdominal pad, dry roll gauze, Hypafix tape, Tubigrip D double layer    2. Chronic venous stasis  Comments: Hemosiderin staining, brawny edema, scarring- findings consistent with CVI.  Pt established at [facility] [place], underwent ablation procedures in 2019.     -Consider referral back to vascular if wounds failed to progress or deteriorate.    3. Acquired deformity of left ankle and foot  Comments: Charcot deformity of foot and ankle.  Very little ROM of ankle.  Patient was referred to orthopedics previously, however did not make appointment.  -Patient will need to follow-up with orthopedics once wounds are healed.    4.  Wound infection     3/24/2021: Patient was prescribed 14-day course of Cipro on 3/17 to treat Pseudomonas.  Should be completing around 3/31.  No wound odor noted today, however drainage continues to be quite heavy.  -Patient to continue bleach solution as described above for 5  to 10 minutes on each day of dressing changes.  Will investigate other topical antiseptics.  -Patient is to complete Cipro as prescribed    5.  Pain associated with wound    03/24/21:  -2% viscous lidocaine applied topically to wound bed for approximately 5 minutes prior to debridement  -Patient tolerated procedure today with no complaints of discomfort.      20 min spent  with patient, time spent counseling, coordinating care, reviewing records, discussing POC, educating patient regarding wound healing and progression.  This time was spent in excess to procedure time.     Please note that this note may have been created using voice recognition software. I have worked with technical experts from [facility] to optimize the interface.  I have made every reasonable attempt to correct obvious errors, but there may be errors of grammar and possibly content that I did not discover before finalizing the note.    N

## 2021-03-25 ENCOUNTER — HOME HEALTH ADMISSION (OUTPATIENT)
Dept: HOME HEALTH SERVICES | Facility: HOME HEALTHCARE | Age: 66
End: 2021-03-25
Payer: MEDICARE

## 2021-03-31 ENCOUNTER — OFFICE VISIT (OUTPATIENT)
Dept: WOUND CARE | Facility: MEDICAL CENTER | Age: 66
End: 2021-03-31
Attending: NURSE PRACTITIONER
Payer: MEDICARE

## 2021-03-31 VITALS
RESPIRATION RATE: 20 BRPM | SYSTOLIC BLOOD PRESSURE: 139 MMHG | TEMPERATURE: 98.7 F | HEART RATE: 100 BPM | DIASTOLIC BLOOD PRESSURE: 92 MMHG | OXYGEN SATURATION: 97 %

## 2021-03-31 DIAGNOSIS — L97.922 SKIN ULCER OF LEFT LOWER LEG WITH FAT LAYER EXPOSED (HCC): Primary | ICD-10-CM

## 2021-03-31 DIAGNOSIS — I87.8 CHRONIC VENOUS STASIS: ICD-10-CM

## 2021-03-31 DIAGNOSIS — M21.962 ACQUIRED DEFORMITY OF LEFT ANKLE AND FOOT: ICD-10-CM

## 2021-03-31 DIAGNOSIS — T14.8XXA PAIN ASSOCIATED WITH WOUND: ICD-10-CM

## 2021-03-31 DIAGNOSIS — L08.9 WOUND INFECTION: ICD-10-CM

## 2021-03-31 DIAGNOSIS — T14.8XXA WOUND INFECTION: ICD-10-CM

## 2021-03-31 DIAGNOSIS — R52 PAIN ASSOCIATED WITH WOUND: ICD-10-CM

## 2021-03-31 PROCEDURE — 11045 DBRDMT SUBQ TISS EACH ADDL: CPT | Performed by: NURSE PRACTITIONER

## 2021-03-31 PROCEDURE — 11042 DBRDMT SUBQ TIS 1ST 20SQCM/<: CPT | Performed by: NURSE PRACTITIONER

## 2021-03-31 PROCEDURE — 11045 DBRDMT SUBQ TISS EACH ADDL: CPT

## 2021-03-31 PROCEDURE — 11042 DBRDMT SUBQ TIS 1ST 20SQCM/<: CPT

## 2021-03-31 ASSESSMENT — ENCOUNTER SYMPTOMS
DEPRESSION: 1
WHEEZING: 0
SHORTNESS OF BREATH: 0
CHILLS: 0
NAUSEA: 0
WEAKNESS: 0
CLAUDICATION: 0
HEADACHES: 0
FEVER: 0
MYALGIAS: 0
PALPITATIONS: 0
VOMITING: 0
COUGH: 0
DIZZINESS: 0

## 2021-03-31 NOTE — PROGRESS NOTES
Provider Encounter- Full Thickness wound    HISTORY OF PRESENT ILLNESS  Wound History:    START OF CARE IN CLINIC: 5/20/2020               REFERRING PROVIDER: KEV Keith                 WOUND ETIOLOGY: venous,  likely mixed etiology              LOCATION: Left lower leg, multiple wounds                                   Right lower leg, multiple wounds-first observed in clinic on initial evaluation, 6/5/2020.  Resolved 9/2/2020                HISTORY: Patient is very well-known to this clinic from previous treatment of wounds to his left lower extremity.  He was discharged from the clinic in March 2020 due to full resolution of his wound.  He returned to the clinic approximately 3 weeks later, on 4/16, with cellulitis, edema, and large open wounds to his left lower leg, and was sent directly to [facility] emergency room.  He was admitted for IV antibiotics and surgical intervention.  On 4/22/2020, he underwent an I&D of his left lower extremity, fasciotomy, and VAC placement.  In the following weeks he underwent surgery 3 more times for irrigation and debridement.  He was discharged home on 5/21, on the VAC, with home health and a referral to [facility].       Pertinent Medical History:  Anxiety, Charcot's joint of left foot, non-diabetic (3/21/2016), Chronic congestive heart failure (HCC) (11/16/2017), Hepatitis C, chronic (HCC), Hypertension, Migraine, Polysubstance abuse (HCC) (3/8/2018), Tobacco use (4/18/2016), Ulcer of left lower extremity with necrosis of muscle (HCC) (3/21/2016), and Venous stasis ulcer ([facility]) (2017).                ETIOLOGY HISTORY:  Vascular Surgeon: Dr. White. Compression Circ-aid. Varicose Veins none visible     TOBACCO USE: Patient denies; patient also denies alcohol and recreational drug use    Patient's problem list, allergies, and current medications reviewed and updated in Epic    Interval History:  Interval History thinned 2/10/2021.  Please see previous notes for  complete interval history.       2/10/2021: Clinic visit with KEV Luna. Patient states that they are feeling well today.  Patient denies fever, chills, nausea, vomiting, lightheadedness, dizziness, shortness of breath and chest pain.  Patient with significant decrease in Pseudomonas to left lower extremity.  Patient is to finish his course of ciprofloxacin on 2/13/2021.  Patient is to continue with bleach soaks prior to wound dressing changes.  Continue with 3 times a week dressing changes.  Wound looks significantly better.    2/17/2021: Clinic visit with KEV Luna. Patient states that they are feeling well today.  Patient denies fever, chills, nausea, vomiting, lightheadedness, dizziness, shortness of breath and chest pain.  Wound bed and periwound area significantly improved we will continue with bleach soaks prior to wound dressing changes.  Pseudomonas has significantly declined.  Continue to monitor patient has finished ciprofloxacin.  Patient did have a significant buildup of slough to wound beds only.    2/24/2021: Clinic visit with KEV Luna. Patient states that they are feeling well today.  Patient denies fever, chills, nausea, vomiting, lightheadedness, dizziness, shortness of breath and chest pain. Patient reports that he is soaking his leg in a bleach water bath two times a week prior to wound care dressing changes.  Patient has had a significant improvement in wound size and periwound condition.  He is to continue with bleach water soak, will reevaluate next clinic visit.      3/3/2021: Clinic visit with KEV Luna. Patient states that they are feeling well today.  Patient denies fever, chills, nausea, vomiting, lightheadedness, dizziness, shortness of breath and chest pain.   Patient has decreased depth to wounds with improved granulation tissue growth.  Patient still has a significant amount of slough to the surface of all wound beds.  Patient is to  continue with bleach soaks of his left lower leg.  This is helping to keep the Pseudomonas level in check and allowing for wound bed progression.      3/10/2021: Clinic visit with KEV Luna. Patient states that they are feeling well today.  Patient denies fever, chills, nausea, vomiting, lightheadedness, dizziness, shortness of breath and chest pain.  Patient's wounds are slowly progressing.  Patient still has a significant amount of pseudomonal component to his wound beds.  However, this is significantly improved we will continue to use bleach baths to decrease pseudomonal component.     3/17/2021: Clinic visit with KEV Luna. Patient states that they are feeling well today.  Patient denies fever, chills, nausea, vomiting, lightheadedness, dizziness, shortness of breath and chest pain.  Patient's Pseudomonas level has increased with new wounds to the anterior aspect of his leg.  I will order the patient Cipro to help treat the left lower extremity Pseudomonas.  Patient to continue with bleach baths.      3/24/2021 : Clinic visit with KEV Brantley, FNP-BC, CWOCN, CFCN.  Goran states he is feeling well, denies fevers, chills, nausea, vomiting, cough or shortness of breath.  He has continued to do bleach baths to his lower leg on the days he has home health.  Otherwise he is changing wraps as needed in between home health and clinic visits.  Told me today that he is drinking aloe vera because he was told it would help with wound healing.    3/31/2021: Clinic visit with KEV Luna. Patient states that they are feeling well today.  Patient denies fever, chills, nausea, vomiting, lightheadedness, dizziness, shortness of breath and chest pain.  Patient had significant Pseudomonas again even with the use of bleach baths and more frequent dressing changes.  Patient's wound debrided in clinic today however, we are not making much headway due to excessive Pseudomonas.  Patient may  need hospitalization with IV antibiotics and debridement.  Patient is also on oral Cipro with no resolution of pseudomonal infection at this time.    REVIEW OF SYSTEMS:   Review of Systems   Constitutional: Negative for chills and fever.        States he is eating well, appetite normal   Respiratory: Negative for cough, shortness of breath and wheezing.    Cardiovascular: Positive for leg swelling. Negative for chest pain, palpitations and claudication.        Chronic swelling in legs, for several years   Gastrointestinal: Negative for nausea and vomiting.   Musculoskeletal: Positive for joint pain. Negative for myalgias.        Chronic   Neurological: Negative for dizziness, weakness and headaches.   Psychiatric/Behavioral: Positive for depression.       PHYSICAL EXAMINATION:   /92   Pulse 100   Temp 37.1 °C (98.7 °F) (Temporal)   Resp 20   SpO2 97%     Physical Exam   Constitutional: He is oriented to person, place, and time and well-developed, well-nourished, and in no distress. No distress.   HENT:   Head: Normocephalic and atraumatic.   Eyes: Pupils are equal, round, and reactive to light. Conjunctivae and EOM are normal.   Cardiovascular: Intact distal pulses.   Pulmonary/Chest: Effort normal. No respiratory distress. He has no wheezes.   Musculoskeletal:         General: Deformity present. No edema. Normal range of motion.      Cervical back: Neck supple.      Comments: Left charcot foot and ankle     Neurological: He is alert and oriented to person, place, and time.   Skin: Skin is warm. He is not diaphoretic.   Large full-thickness wounds to lower extremities  Significant increase in Pseudomonas to left lower extremity with degradation of wound beds.     Psychiatric: Mood, affect and judgment normal.                                                         WOUND ASSESSMENT             Wound 06/05/20 Full Thickness Wound Leg Left --Left Lateral LE (Active)   Wound Image    03/31/21 1342   Site  Assessment Pink;Yellow 03/31/21 1342   Periwound Assessment Maceration;Edema;Fragile 03/31/21 1342   Margins Epibole (rolled edges);Unattached edges 03/31/21 1342   Closure Secondary intention 01/20/21 1300   Drainage Amount Copious 03/31/21 1342   Drainage Description Serosanguineous;Tan;Green 03/31/21 1342   Treatments Cleansed;Topical Lidocaine;Provider debridement 03/31/21 1342   Wound Cleansing Foam Cleanser/Washcloth 03/31/21 1342   Periwound Protectant Barrier Paste 03/31/21 1342   Dressing Cleansing/Solutions Not Applicable 03/31/21 1342   Dressing Options Hydrofiber Silver;Absorbent Abdominal Pad;Dry Roll Gauze;Hypafix Tape;Tubigrip 03/31/21 1342   Dressing Changed Changed 02/24/21 1400   Dressing Status Clean;Dry;Intact 02/24/21 1400   Dressing Change/Treatment Frequency Every 48 hrs, and As Needed 01/20/21 1300   Non-staged Wound Description Full thickness 03/31/21 1342   Wound Length (cm) 18 cm 03/31/21 1342   Wound Width (cm) 5.5 cm 03/31/21 1342   Wound Depth (cm) 0.2 cm 03/31/21 1342   Wound Surface Area (cm^2) 99 cm^2 03/31/21 1342   Wound Volume (cm^3) 19.8 cm^3 03/31/21 1342   Post-Procedure Length (cm) 18.1 cm 03/31/21 1342   Post-Procedure Width (cm) 5.6 cm 03/31/21 1342   Post-Procedure Depth (cm) 0.2 cm 03/31/21 1342   Post-Procedure Surface Area (cm^2) 101.36 cm^2 03/31/21 1342   Post-Procedure Volume (cm^3) 20.27 cm^3 03/31/21 1342   Wound Healing % 64 03/31/21 1342   Wound Bed Granulation (%) 10 % 02/10/21 1300   Wound Bed Epithelium (%) 90 % 02/10/21 1300   Wound Bed Slough (%) 90 % 03/31/21 1342   Wound Bed Eschar (%) 0 % 01/13/21 1345   Tunneling (cm) 0 cm 03/31/21 1342   Undermining (cm) 0 cm 03/31/21 1342   Wound Odor Foul 03/31/21 1342   Exposed Structures None 03/31/21 1342       Wound 01/20/21 -Left Medial/Posterior/Lateral LE (Active)   Wound Image    03/31/21 1342   Site Assessment Yellow;Vass 03/31/21 1342   Periwound Assessment Scar tissue;Maceration;Edema 03/31/21 1342   Margins  Epibole (rolled edges);Unattached edges 03/31/21 1342   Closure Secondary intention 02/24/21 1400   Drainage Amount Copious 03/31/21 1342   Drainage Description Serosanguineous;Tan;Green 03/31/21 1342   Treatments Cleansed;Topical Lidocaine;Provider debridement 03/31/21 1342   Wound Cleansing Foam Cleanser/Washcloth 03/31/21 1342   Periwound Protectant Barrier Paste 03/31/21 1342   Dressing Cleansing/Solutions Not Applicable 03/31/21 1342   Dressing Options Hydrofiber Silver;Absorbent Abdominal Pad;Dry Roll Gauze;Hypafix Tape;Tubigrip 03/31/21 1342   Dressing Changed Changed 02/24/21 1400   Dressing Status Clean;Dry;Intact 02/24/21 1400   Non-staged Wound Description Full thickness 03/31/21 1342   Wound Length (cm) 12.5 cm 03/31/21 1342   Wound Width (cm) 11 cm 03/31/21 1342   Wound Depth (cm) 0.3 cm 03/31/21 1342   Wound Surface Area (cm^2) 137.5 cm^2 03/31/21 1342   Wound Volume (cm^3) 41.25 cm^3 03/31/21 1342   Post-Procedure Length (cm) 16 cm 03/31/21 1342   Post-Procedure Width (cm) 14 cm 03/31/21 1342   Post-Procedure Depth (cm) 0.3 cm 03/31/21 1342   Post-Procedure Surface Area (cm^2) 224 cm^2 03/31/21 1342   Post-Procedure Volume (cm^3) 67.2 cm^3 03/31/21 1342   Wound Healing % -3487 03/31/21 1342   Wound Bed Granulation (%) 30 % 02/10/21 1300   Wound Bed Epithelium (%) 0 % 02/10/21 1300   Wound Bed Slough (%) 85 % 03/17/21 1344   Wound Bed Eschar (%) 0 % 02/10/21 1300   Tunneling (cm) 0 cm 03/31/21 1342   Undermining (cm) 0 cm 03/31/21 1342   Wound Odor Foul 03/31/21 1342   Exposed Structures None 03/31/21 1342       Wound 03/17/21 Left anterior ankle cluster (Active)   Wound Image    03/31/21 1342   Site Assessment Pink;Yellow 03/31/21 1342   Periwound Assessment Maceration 03/31/21 1342   Margins Attached edges;Undefined edges 03/31/21 1342   Drainage Amount Copious 03/31/21 1342   Drainage Description Serosanguineous;Tan;Green 03/31/21 1342   Treatments Cleansed;Topical Lidocaine;Provider debridement  03/31/21 1342   Wound Cleansing Foam Cleanser/Washcloth 03/31/21 1342   Periwound Protectant Barrier Paste 03/31/21 1342   Dressing Cleansing/Solutions Not Applicable 03/31/21 1342   Dressing Options Hydrofiber Silver;Absorbent Abdominal Pad;Dry Roll Gauze;Hypafix Tape;Tubigrip 03/31/21 1342   Non-staged Wound Description Full thickness 03/31/21 1342   Wound Length (cm) 2 cm 03/31/21 1342   Wound Width (cm) 4 cm 03/31/21 1342   Wound Depth (cm) 0.1 cm 03/31/21 1342   Wound Surface Area (cm^2) 8 cm^2 03/31/21 1342   Wound Volume (cm^3) 0.8 cm^3 03/31/21 1342   Post-Procedure Length (cm) 3.5 cm 03/31/21 1342   Post-Procedure Width (cm) 6 cm 03/31/21 1342   Post-Procedure Depth (cm) 0.1 cm 03/31/21 1342   Post-Procedure Surface Area (cm^2) 21 cm^2 03/31/21 1342   Post-Procedure Volume (cm^3) 2.1 cm^3 03/31/21 1342   Wound Healing % 96 03/31/21 1342   Wound Bed Slough (%) 85 % 03/31/21 1342   Wound Bed Slough % - (Post-Procedure) 0 % 03/17/21 1344   Tunneling (cm) 0 cm 03/31/21 1342   Undermining (cm) 0 cm 03/31/21 1342   Wound Odor Foul 03/31/21 1342   Exposed Structures None 03/31/21 1342       Wound 03/31/21 RLE lateral (Active)   Wound Image   03/31/21 1342   Site Assessment Dry;Brown 03/31/21 1342   Periwound Assessment Edema 03/31/21 1342   Margins Attached edges 03/31/21 1342   Drainage Amount Moderate 03/31/21 1342   Drainage Description Serosanguineous 03/31/21 1342   Treatments Cleansed;Site care;Compression 03/31/21 1342   Wound Cleansing Foam Cleanser/Washcloth 03/31/21 1342   Periwound Protectant Barrier Paste 03/31/21 1342   Dressing Options Silicone Adhesive Foam;Tubigrip 03/31/21 1342   Tunneling (cm) 0 cm 03/31/21 1342   Undermining (cm) 0 cm 03/31/21 1342   Wound Odor Foul 03/31/21 1342   Exposed Structures None 03/31/21 1342                    PROCEDURE: Excisional debridement of left lower extremity wounds   -2% viscous lidocaine applied topically to wound beds for approximately 10 minutes prior to  debridement  -Curette used to excise thick layer of slough from wound beds.  Excisional debridement was performed to remove devitalized tissue from all wounds until healthy, bleeding tissue was visualized.  Total area debrided approximately (100% accuracy of wound measurement difficult due to the diffuse nature of the wounds) 200 cm² tissue debrided into the subcutaneous layer of all wounds.    -Bleeding controlled with manual pressure.    -Wounds cleansed with normal saline  -Patient tolerated the procedure well, without c/o pain or discomfort.         Pertinent Labs and Diagnostics:    Labs:     A1c:   Lab Results   Component Value Date/Time    HBA1C 5.7 (H) 2020 11:22 AM      Imagin/3/2020-MRI left tibia-fibula with and without contrast  IMPRESSION:     Moderate lateral leg cellulitis and underlying myositis     No abscess or osteomyelitis    VASCULAR STUDIES: BHUPINDER 2019   Right.    Doppler waveform of the common femoral artery is of high amplitude and    triphasic.    Doppler waveforms at the ankle are brisk and triphasic.    Ankle-brachial index is normal.       Left.    Could not perform ankle-brachial index due to large blisters/ulcers.   Normal toe-brachial index: 0.94   Doppler waveform of the common femoral artery is of high amplitude and    triphasic.    Biphasic waveforms seen at the ankle.     WOUND CULTURE:    2020: Culture collected in clinic  Culture Result Abnormal   Staphylococcus aureus   Light growth     Culture Result Abnormal   Pseudomonas aeruginosa   Light growth     Culture Result Abnormal   Enterococcus faecalis   Light growth              ASSESSMENT AND PLAN:     1. Skin ulcer of left lower leg with fat layer exposed (HCC)  Comment: Patient has history of recurring ulcerations to left lower extremity.  Has undergone ablation procedures by Dr. White.  Discharged from [facility] in 2020 due to healing, returned approximately 3 weeks later much worse.  Hospitalized  from 4/16 until 5/21/2020 underwent multiple surgical debridements.    03/31/21: Patient's wounds have completely stalled, Pseudomonas still has heavy growth.    -Excisional debridement of wounds in clinic today, medically necessary to promote wound healing.  Patient's tissue has turned very fibrous in nature has poor vascular flow.  -Patient to return to clinic weekly for assessment and debridement  -Will consider referral for surgical debridement and IV antibiotics due to fibrous quality of tissue and significant pseudomonal growth not resolved with bleach baths and oral Cipro.  -Home health to change dressing 2- 3 times per week in between clinic visits until we can establish a dry periwound environment without Pseudomonas.  Status of patient's left lower extremity wound beds have declined with increased Pseudomonas.    -Patient is to continue to soak his left leg in a bleach bath for approximately 5 to 10 minutes prior to wound dressing changes.        Wound care: Hydrofiber silver, absorbent abdominal pad, dry roll gauze, Hypafix tape, Tubigrip E double layer    2. Chronic venous stasis  Comments: Hemosiderin staining, brawny edema, scarring- findings consistent with CVI.  Pt established at [facility], underwent ablation procedures in 2019.     -Consider referral back to vascular if wounds failed to progress or deteriorate.    3. Acquired deformity of left ankle and foot  Comments: Charcot deformity of foot and ankle.  Very little ROM of ankle.  Patient was referred to orthopedics previously, however did not make appointment.  -Patient will need to follow-up with orthopedics once wounds are healed.    4.  Wound infection     3/31/2021: Patient was prescribed 14-day course of Cipro on 3/17 to treat Pseudomonas.  Should be completing around 3/31.    However drainage continues to be quite heavy.  -Patient to continue bleach solution as described above for 5 to 10 minutes on each day of dressing changes.    -Patient is to complete Cipro as prescribed    5.  Pain associated with wound    03/31/21:  -2% viscous lidocaine applied topically to wound bed for approximately 5 minutes prior to debridement  -Patient tolerated procedure today with no complaints of discomfort.      Please note that this note may have been created using voice recognition software. I have worked with technical experts from [facility] to optimize the interface.  I have made every reasonable attempt to correct obvious errors, but there may be errors of grammar and possibly content that I did not discover before finalizing the note.    N

## 2021-03-31 NOTE — PROGRESS NOTES
No changes to home health orders. Wound care orders faxed to [facility] health #[phone]

## 2021-03-31 NOTE — PATIENT INSTRUCTIONS
-Keep dressings clean and dry. Change dressings if they become over saturated, soiled or fall off.     -Avoid prolonged standing or sitting without elevating your legs.    -Remove your compression garments if you have severe pain, severe swelling, numbness, color change, or temperature change in your toes. If you need to remove your compression garments, do so by unrolling them. Do not cut the compression garments off, this is to prevent cutting yourself on accident.    -Should you experience any significant changes in your wound(s), such as signs of infection (redness, swelling, localized heat, increased pain, fever > 101 F, chills) or have any questions regarding your home care instructions, please contact the wound center at [phone]. If after hours, contact your primary care physician or go to the hospital emergency room.

## 2021-04-07 ENCOUNTER — TELEPHONE (OUTPATIENT)
Dept: WOUND CARE | Facility: MEDICAL CENTER | Age: 66
End: 2021-04-07

## 2021-04-07 NOTE — TELEPHONE ENCOUNTER
Discussed with Dr. Gibson in IDT rounds with conclusion patient would benefit from hospitalization for IV antibiotics, possible surgical debridement of wounds.

## 2021-04-10 ENCOUNTER — HOSPITAL ENCOUNTER (INPATIENT)
Facility: MEDICAL CENTER | Age: 66
LOS: 3 days | DRG: 603 | End: 2021-04-14
Attending: EMERGENCY MEDICINE | Admitting: STUDENT IN AN ORGANIZED HEALTH CARE EDUCATION/TRAINING PROGRAM
Payer: MEDICARE

## 2021-04-10 DIAGNOSIS — I83.029 VENOUS STASIS ULCER OF LEFT LOWER EXTREMITY (HCC): ICD-10-CM

## 2021-04-10 DIAGNOSIS — M14.672 CHARCOT'S JOINT OF LEFT FOOT, NON-DIABETIC: ICD-10-CM

## 2021-04-10 DIAGNOSIS — L08.9 WOUND INFECTION: Primary | ICD-10-CM

## 2021-04-10 DIAGNOSIS — T14.8XXA WOUND INFECTION: Primary | ICD-10-CM

## 2021-04-10 DIAGNOSIS — I87.2 VENOUS STASIS DERMATITIS OF BOTH LOWER EXTREMITIES: ICD-10-CM

## 2021-04-10 DIAGNOSIS — L97.929 VENOUS STASIS ULCER OF LEFT LOWER EXTREMITY (HCC): ICD-10-CM

## 2021-04-10 PROCEDURE — 99285 EMERGENCY DEPT VISIT HI MDM: CPT

## 2021-04-10 ASSESSMENT — FIBROSIS 4 INDEX: FIB4 SCORE: 1.48

## 2021-04-11 ENCOUNTER — APPOINTMENT (OUTPATIENT)
Dept: RADIOLOGY | Facility: MEDICAL CENTER | Age: 66
DRG: 603 | End: 2021-04-11
Attending: NURSE PRACTITIONER
Payer: MEDICARE

## 2021-04-11 ENCOUNTER — APPOINTMENT (OUTPATIENT)
Dept: RADIOLOGY | Facility: MEDICAL CENTER | Age: 66
DRG: 603 | End: 2021-04-11
Attending: INTERNAL MEDICINE
Payer: MEDICARE

## 2021-04-11 PROBLEM — L03.90 CELLULITIS: Status: ACTIVE | Noted: 2021-04-11

## 2021-04-11 LAB
ALBUMIN SERPL BCP-MCNC: 2.9 G/DL (ref 3.2–4.9)
ALBUMIN/GLOB SERPL: 0.5 G/DL
ALP SERPL-CCNC: 189 U/L (ref 30–99)
ALT SERPL-CCNC: 21 U/L (ref 2–50)
AMPHET UR QL SCN: POSITIVE
ANION GAP SERPL CALC-SCNC: 7 MMOL/L (ref 7–16)
AST SERPL-CCNC: 34 U/L (ref 12–45)
BARBITURATES UR QL SCN: NEGATIVE
BASOPHILS # BLD AUTO: 0.7 % (ref 0–1.8)
BASOPHILS # BLD: 0.06 K/UL (ref 0–0.12)
BENZODIAZ UR QL SCN: NEGATIVE
BILIRUB SERPL-MCNC: 0.4 MG/DL (ref 0.1–1.5)
BUN SERPL-MCNC: 26 MG/DL (ref 8–22)
BZE UR QL SCN: NEGATIVE
CALCIUM SERPL-MCNC: 8.1 MG/DL (ref 8.5–10.5)
CANNABINOIDS UR QL SCN: POSITIVE
CHLORIDE SERPL-SCNC: 109 MMOL/L (ref 96–112)
CO2 SERPL-SCNC: 21 MMOL/L (ref 20–33)
CREAT SERPL-MCNC: 1.05 MG/DL (ref 0.5–1.4)
CRP SERPL HS-MCNC: 13.2 MG/DL (ref 0–0.75)
EOSINOPHIL # BLD AUTO: 0.28 K/UL (ref 0–0.51)
EOSINOPHIL NFR BLD: 3.4 % (ref 0–6.9)
ERYTHROCYTE [DISTWIDTH] IN BLOOD BY AUTOMATED COUNT: 60.7 FL (ref 35.9–50)
ERYTHROCYTE [SEDIMENTATION RATE] IN BLOOD BY WESTERGREN METHOD: 116 MM/HOUR (ref 0–20)
GLOBULIN SER CALC-MCNC: 5.4 G/DL (ref 1.9–3.5)
GLUCOSE SERPL-MCNC: 91 MG/DL (ref 65–99)
HCT VFR BLD AUTO: 41.6 % (ref 42–52)
HGB BLD-MCNC: 13 G/DL (ref 14–18)
IMM GRANULOCYTES # BLD AUTO: 0.01 K/UL (ref 0–0.11)
IMM GRANULOCYTES NFR BLD AUTO: 0.1 % (ref 0–0.9)
LACTATE BLD-SCNC: 1.7 MMOL/L (ref 0.5–2)
LYMPHOCYTES # BLD AUTO: 0.87 K/UL (ref 1–4.8)
LYMPHOCYTES NFR BLD: 10.6 % (ref 22–41)
MCH RBC QN AUTO: 30.3 PG (ref 27–33)
MCHC RBC AUTO-ENTMCNC: 31.3 G/DL (ref 33.7–35.3)
MCV RBC AUTO: 97 FL (ref 81.4–97.8)
METHADONE UR QL SCN: NEGATIVE
MONOCYTES # BLD AUTO: 0.9 K/UL (ref 0–0.85)
MONOCYTES NFR BLD AUTO: 11 % (ref 0–13.4)
NEUTROPHILS # BLD AUTO: 6.05 K/UL (ref 1.82–7.42)
NEUTROPHILS NFR BLD: 74.2 % (ref 44–72)
NRBC # BLD AUTO: 0 K/UL
NRBC BLD-RTO: 0 /100 WBC
OPIATES UR QL SCN: NEGATIVE
OXYCODONE UR QL SCN: NEGATIVE
PCP UR QL SCN: NEGATIVE
PLATELET # BLD AUTO: 380 K/UL (ref 164–446)
PMV BLD AUTO: 9 FL (ref 9–12.9)
POTASSIUM SERPL-SCNC: 4.3 MMOL/L (ref 3.6–5.5)
PROPOXYPH UR QL SCN: NEGATIVE
PROT SERPL-MCNC: 8.3 G/DL (ref 6–8.2)
RBC # BLD AUTO: 4.29 M/UL (ref 4.7–6.1)
SARS-COV-2 RNA RESP QL NAA+PROBE: NOTDETECTED
SODIUM SERPL-SCNC: 137 MMOL/L (ref 135–145)
SPECIMEN SOURCE: NORMAL
WBC # BLD AUTO: 8.2 K/UL (ref 4.8–10.8)

## 2021-04-11 PROCEDURE — 770006 HCHG ROOM/CARE - MED/SURG/GYN SEMI*

## 2021-04-11 PROCEDURE — 700111 HCHG RX REV CODE 636 W/ 250 OVERRIDE (IP): Performed by: EMERGENCY MEDICINE

## 2021-04-11 PROCEDURE — 700102 HCHG RX REV CODE 250 W/ 637 OVERRIDE(OP): Performed by: EMERGENCY MEDICINE

## 2021-04-11 PROCEDURE — 700102 HCHG RX REV CODE 250 W/ 637 OVERRIDE(OP): Performed by: INTERNAL MEDICINE

## 2021-04-11 PROCEDURE — 93925 LOWER EXTREMITY STUDY: CPT

## 2021-04-11 PROCEDURE — 87040 BLOOD CULTURE FOR BACTERIA: CPT

## 2021-04-11 PROCEDURE — 99223 1ST HOSP IP/OBS HIGH 75: CPT | Mod: AI | Performed by: STUDENT IN AN ORGANIZED HEALTH CARE EDUCATION/TRAINING PROGRAM

## 2021-04-11 PROCEDURE — 87070 CULTURE OTHR SPECIMN AEROBIC: CPT

## 2021-04-11 PROCEDURE — 700111 HCHG RX REV CODE 636 W/ 250 OVERRIDE (IP): Performed by: INTERNAL MEDICINE

## 2021-04-11 PROCEDURE — 73630 X-RAY EXAM OF FOOT: CPT | Mod: LT

## 2021-04-11 PROCEDURE — 85652 RBC SED RATE AUTOMATED: CPT

## 2021-04-11 PROCEDURE — 99222 1ST HOSP IP/OBS MODERATE 55: CPT | Performed by: NURSE PRACTITIONER

## 2021-04-11 PROCEDURE — 700111 HCHG RX REV CODE 636 W/ 250 OVERRIDE (IP): Performed by: STUDENT IN AN ORGANIZED HEALTH CARE EDUCATION/TRAINING PROGRAM

## 2021-04-11 PROCEDURE — 85025 COMPLETE CBC W/AUTO DIFF WBC: CPT

## 2021-04-11 PROCEDURE — U0003 INFECTIOUS AGENT DETECTION BY NUCLEIC ACID (DNA OR RNA); SEVERE ACUTE RESPIRATORY SYNDROME CORONAVIRUS 2 (SARS-COV-2) (CORONAVIRUS DISEASE [COVID-19]), AMPLIFIED PROBE TECHNIQUE, MAKING USE OF HIGH THROUGHPUT TECHNOLOGIES AS DESCRIBED BY CMS-2020-01-R: HCPCS

## 2021-04-11 PROCEDURE — 73630 X-RAY EXAM OF FOOT: CPT | Mod: RT

## 2021-04-11 PROCEDURE — 700105 HCHG RX REV CODE 258: Performed by: EMERGENCY MEDICINE

## 2021-04-11 PROCEDURE — 96375 TX/PRO/DX INJ NEW DRUG ADDON: CPT

## 2021-04-11 PROCEDURE — A9270 NON-COVERED ITEM OR SERVICE: HCPCS | Performed by: EMERGENCY MEDICINE

## 2021-04-11 PROCEDURE — 80053 COMPREHEN METABOLIC PANEL: CPT

## 2021-04-11 PROCEDURE — 87077 CULTURE AEROBIC IDENTIFY: CPT

## 2021-04-11 PROCEDURE — 80307 DRUG TEST PRSMV CHEM ANLYZR: CPT

## 2021-04-11 PROCEDURE — 87205 SMEAR GRAM STAIN: CPT

## 2021-04-11 PROCEDURE — 96365 THER/PROPH/DIAG IV INF INIT: CPT

## 2021-04-11 PROCEDURE — 93970 EXTREMITY STUDY: CPT

## 2021-04-11 PROCEDURE — 700101 HCHG RX REV CODE 250: Performed by: STUDENT IN AN ORGANIZED HEALTH CARE EDUCATION/TRAINING PROGRAM

## 2021-04-11 PROCEDURE — 700105 HCHG RX REV CODE 258: Performed by: STUDENT IN AN ORGANIZED HEALTH CARE EDUCATION/TRAINING PROGRAM

## 2021-04-11 PROCEDURE — 87186 SC STD MICRODIL/AGAR DIL: CPT | Mod: 91

## 2021-04-11 PROCEDURE — 83605 ASSAY OF LACTIC ACID: CPT

## 2021-04-11 PROCEDURE — A9270 NON-COVERED ITEM OR SERVICE: HCPCS | Performed by: INTERNAL MEDICINE

## 2021-04-11 PROCEDURE — U0005 INFEC AGEN DETEC AMPLI PROBE: HCPCS

## 2021-04-11 PROCEDURE — 86140 C-REACTIVE PROTEIN: CPT

## 2021-04-11 PROCEDURE — A9270 NON-COVERED ITEM OR SERVICE: HCPCS | Performed by: STUDENT IN AN ORGANIZED HEALTH CARE EDUCATION/TRAINING PROGRAM

## 2021-04-11 PROCEDURE — 87147 CULTURE TYPE IMMUNOLOGIC: CPT

## 2021-04-11 PROCEDURE — 700102 HCHG RX REV CODE 250 W/ 637 OVERRIDE(OP): Performed by: STUDENT IN AN ORGANIZED HEALTH CARE EDUCATION/TRAINING PROGRAM

## 2021-04-11 RX ORDER — LINEZOLID 600 MG/1
600 TABLET, FILM COATED ORAL EVERY 12 HOURS
Status: DISCONTINUED | OUTPATIENT
Start: 2021-04-11 | End: 2021-04-12

## 2021-04-11 RX ORDER — OXYCODONE HYDROCHLORIDE 5 MG/1
2.5 TABLET ORAL
Status: DISCONTINUED | OUTPATIENT
Start: 2021-04-11 | End: 2021-04-14 | Stop reason: HOSPADM

## 2021-04-11 RX ORDER — LINEZOLID 600 MG/1
600 TABLET, FILM COATED ORAL ONCE
Status: COMPLETED | OUTPATIENT
Start: 2021-04-11 | End: 2021-04-11

## 2021-04-11 RX ORDER — ATORVASTATIN CALCIUM 40 MG/1
40 TABLET, FILM COATED ORAL EVERY EVENING
Status: DISCONTINUED | OUTPATIENT
Start: 2021-04-11 | End: 2021-04-14 | Stop reason: HOSPADM

## 2021-04-11 RX ORDER — LABETALOL HYDROCHLORIDE 5 MG/ML
10 INJECTION, SOLUTION INTRAVENOUS EVERY 4 HOURS PRN
Status: DISCONTINUED | OUTPATIENT
Start: 2021-04-11 | End: 2021-04-14 | Stop reason: HOSPADM

## 2021-04-11 RX ORDER — HEPARIN SODIUM 5000 [USP'U]/ML
5000 INJECTION, SOLUTION INTRAVENOUS; SUBCUTANEOUS EVERY 8 HOURS
Status: DISCONTINUED | OUTPATIENT
Start: 2021-04-11 | End: 2021-04-14 | Stop reason: HOSPADM

## 2021-04-11 RX ORDER — AMLODIPINE BESYLATE 10 MG/1
10 TABLET ORAL
Status: DISCONTINUED | OUTPATIENT
Start: 2021-04-11 | End: 2021-04-14 | Stop reason: HOSPADM

## 2021-04-11 RX ORDER — ASPIRIN 81 MG/1
81 TABLET, CHEWABLE ORAL DAILY
Status: DISCONTINUED | OUTPATIENT
Start: 2021-04-11 | End: 2021-04-14 | Stop reason: HOSPADM

## 2021-04-11 RX ORDER — FERROUS GLUCONATE 324(37.5)
TABLET ORAL
COMMUNITY
Start: 2021-03-03 | End: 2021-04-11

## 2021-04-11 RX ORDER — ASCORBIC ACID 500 MG
500 TABLET ORAL DAILY
Status: DISCONTINUED | OUTPATIENT
Start: 2021-04-11 | End: 2021-04-14 | Stop reason: HOSPADM

## 2021-04-11 RX ORDER — SERTRALINE HYDROCHLORIDE 100 MG/1
100 TABLET, FILM COATED ORAL DAILY
Status: DISCONTINUED | OUTPATIENT
Start: 2021-04-11 | End: 2021-04-14 | Stop reason: HOSPADM

## 2021-04-11 RX ORDER — HYDROCODONE BITARTRATE AND ACETAMINOPHEN 5; 325 MG/1; MG/1
1 TABLET ORAL ONCE
Status: COMPLETED | OUTPATIENT
Start: 2021-04-11 | End: 2021-04-11

## 2021-04-11 RX ORDER — AMITRIPTYLINE HYDROCHLORIDE 25 MG/1
25 TABLET, FILM COATED ORAL NIGHTLY PRN
Status: DISCONTINUED | OUTPATIENT
Start: 2021-04-11 | End: 2021-04-11

## 2021-04-11 RX ORDER — MORPHINE SULFATE 4 MG/ML
2 INJECTION, SOLUTION INTRAMUSCULAR; INTRAVENOUS
Status: DISCONTINUED | OUTPATIENT
Start: 2021-04-11 | End: 2021-04-14 | Stop reason: HOSPADM

## 2021-04-11 RX ORDER — BISACODYL 10 MG
10 SUPPOSITORY, RECTAL RECTAL
Status: DISCONTINUED | OUTPATIENT
Start: 2021-04-11 | End: 2021-04-14 | Stop reason: HOSPADM

## 2021-04-11 RX ORDER — HYDROXYZINE PAMOATE 50 MG/1
50 CAPSULE ORAL EVERY 8 HOURS PRN
Status: DISCONTINUED | OUTPATIENT
Start: 2021-04-11 | End: 2021-04-11

## 2021-04-11 RX ORDER — FERROUS GLUCONATE 324(38)MG
324 TABLET ORAL
Status: DISCONTINUED | OUTPATIENT
Start: 2021-04-11 | End: 2021-04-14 | Stop reason: HOSPADM

## 2021-04-11 RX ORDER — AMOXICILLIN 250 MG
2 CAPSULE ORAL 2 TIMES DAILY
Status: DISCONTINUED | OUTPATIENT
Start: 2021-04-11 | End: 2021-04-14 | Stop reason: HOSPADM

## 2021-04-11 RX ORDER — OXYCODONE HYDROCHLORIDE 5 MG/1
5 TABLET ORAL
Status: DISCONTINUED | OUTPATIENT
Start: 2021-04-11 | End: 2021-04-14 | Stop reason: HOSPADM

## 2021-04-11 RX ORDER — ACETAMINOPHEN 325 MG/1
650 TABLET ORAL EVERY 6 HOURS PRN
Status: DISCONTINUED | OUTPATIENT
Start: 2021-04-11 | End: 2021-04-14 | Stop reason: HOSPADM

## 2021-04-11 RX ORDER — POLYETHYLENE GLYCOL 3350 17 G/17G
1 POWDER, FOR SOLUTION ORAL
Status: DISCONTINUED | OUTPATIENT
Start: 2021-04-11 | End: 2021-04-14 | Stop reason: HOSPADM

## 2021-04-11 RX ADMIN — MORPHINE SULFATE 2 MG: 4 INJECTION INTRAVENOUS at 13:40

## 2021-04-11 RX ADMIN — LABETALOL HYDROCHLORIDE 10 MG: 5 INJECTION, SOLUTION INTRAVENOUS at 01:40

## 2021-04-11 RX ADMIN — MEROPENEM 500 MG: 500 INJECTION, POWDER, FOR SOLUTION INTRAVENOUS at 05:38

## 2021-04-11 RX ADMIN — OXYCODONE HYDROCHLORIDE AND ACETAMINOPHEN 500 MG: 500 TABLET ORAL at 05:23

## 2021-04-11 RX ADMIN — OXYCODONE HYDROCHLORIDE 2.5 MG: 5 TABLET ORAL at 11:30

## 2021-04-11 RX ADMIN — LINEZOLID 600 MG: 600 TABLET, FILM COATED ORAL at 01:01

## 2021-04-11 RX ADMIN — HEPARIN SODIUM 5000 UNITS: 5000 INJECTION, SOLUTION INTRAVENOUS; SUBCUTANEOUS at 20:59

## 2021-04-11 RX ADMIN — SERTRALINE HYDROCHLORIDE 100 MG: 100 TABLET, FILM COATED ORAL at 05:23

## 2021-04-11 RX ADMIN — HEPARIN SODIUM 5000 UNITS: 5000 INJECTION, SOLUTION INTRAVENOUS; SUBCUTANEOUS at 05:27

## 2021-04-11 RX ADMIN — MEROPENEM 500 MG: 500 INJECTION, POWDER, FOR SOLUTION INTRAVENOUS at 11:30

## 2021-04-11 RX ADMIN — FERROUS GLUCONATE 324 MG: 324 TABLET ORAL at 07:35

## 2021-04-11 RX ADMIN — MEROPENEM 500 MG: 500 INJECTION, POWDER, FOR SOLUTION INTRAVENOUS at 01:01

## 2021-04-11 RX ADMIN — OXYCODONE 5 MG: 5 TABLET ORAL at 15:37

## 2021-04-11 RX ADMIN — AMLODIPINE BESYLATE 10 MG: 10 TABLET ORAL at 05:23

## 2021-04-11 RX ADMIN — MEROPENEM 500 MG: 500 INJECTION, POWDER, FOR SOLUTION INTRAVENOUS at 17:25

## 2021-04-11 RX ADMIN — HYDROCODONE BITARTRATE AND ACETAMINOPHEN 1 TABLET: 5; 325 TABLET ORAL at 01:01

## 2021-04-11 RX ADMIN — ACETAMINOPHEN 650 MG: 325 TABLET, FILM COATED ORAL at 07:35

## 2021-04-11 RX ADMIN — LINEZOLID 600 MG: 600 TABLET, FILM COATED ORAL at 13:34

## 2021-04-11 RX ADMIN — ASPIRIN 81 MG: 81 TABLET, CHEWABLE ORAL at 17:24

## 2021-04-11 RX ADMIN — OXYCODONE 5 MG: 5 TABLET ORAL at 20:59

## 2021-04-11 RX ADMIN — DOCUSATE SODIUM 50 MG AND SENNOSIDES 8.6 MG 2 TABLET: 8.6; 5 TABLET, FILM COATED ORAL at 05:23

## 2021-04-11 RX ADMIN — ATORVASTATIN CALCIUM 40 MG: 40 TABLET, FILM COATED ORAL at 17:24

## 2021-04-11 ASSESSMENT — ENCOUNTER SYMPTOMS
CHILLS: 1
WEAKNESS: 1
HEADACHES: 0
FEVER: 0
FOCAL WEAKNESS: 0
DIZZINESS: 0
BACK PAIN: 0
MYALGIAS: 0
COUGH: 0
VOMITING: 0
SPEECH CHANGE: 0
DEPRESSION: 1
SORE THROAT: 0
NAUSEA: 0
NERVOUS/ANXIOUS: 0
BLURRED VISION: 0
ABDOMINAL PAIN: 0
DIARRHEA: 0
SHORTNESS OF BREATH: 0
CONSTIPATION: 0
CHILLS: 0
SENSORY CHANGE: 0
ORTHOPNEA: 0

## 2021-04-11 ASSESSMENT — PATIENT HEALTH QUESTIONNAIRE - PHQ9
1. LITTLE INTEREST OR PLEASURE IN DOING THINGS: NOT AT ALL
SUM OF ALL RESPONSES TO PHQ9 QUESTIONS 1 AND 2: 0
2. FEELING DOWN, DEPRESSED, IRRITABLE, OR HOPELESS: NOT AT ALL

## 2021-04-11 ASSESSMENT — LIFESTYLE VARIABLES
DOES PATIENT WANT TO STOP DRINKING: NO
HAVE PEOPLE ANNOYED YOU BY CRITICIZING YOUR DRINKING: NO
TOTAL SCORE: 0
TOTAL SCORE: 0
EVER FELT BAD OR GUILTY ABOUT YOUR DRINKING: NO
HOW MANY TIMES IN THE PAST YEAR HAVE YOU HAD 5 OR MORE DRINKS IN A DAY: 0
TOTAL SCORE: 0
EVER HAD A DRINK FIRST THING IN THE MORNING TO STEADY YOUR NERVES TO GET RID OF A HANGOVER: NO
AVERAGE NUMBER OF DAYS PER WEEK YOU HAVE A DRINK CONTAINING ALCOHOL: 0
SUBSTANCE_ABUSE: 0
HAVE YOU EVER FELT YOU SHOULD CUT DOWN ON YOUR DRINKING: NO
ALCOHOL_USE: NO
ON A TYPICAL DAY WHEN YOU DRINK ALCOHOL HOW MANY DRINKS DO YOU HAVE: 0
CONSUMPTION TOTAL: NEGATIVE

## 2021-04-11 ASSESSMENT — COGNITIVE AND FUNCTIONAL STATUS - GENERAL
DRESSING REGULAR LOWER BODY CLOTHING: A LITTLE
MOBILITY SCORE: 15
HELP NEEDED FOR BATHING: A LITTLE
DAILY ACTIVITIY SCORE: 22
SUGGESTED CMS G CODE MODIFIER MOBILITY: CK
CLIMB 3 TO 5 STEPS WITH RAILING: A LOT
STANDING UP FROM CHAIR USING ARMS: A LOT
MOVING FROM LYING ON BACK TO SITTING ON SIDE OF FLAT BED: A LOT
WALKING IN HOSPITAL ROOM: A LOT
MOVING TO AND FROM BED TO CHAIR: A LITTLE
SUGGESTED CMS G CODE MODIFIER DAILY ACTIVITY: CJ

## 2021-04-11 ASSESSMENT — PAIN DESCRIPTION - PAIN TYPE
TYPE: ACUTE PAIN

## 2021-04-11 ASSESSMENT — FIBROSIS 4 INDEX: FIB4 SCORE: 1.27

## 2021-04-11 NOTE — ASSESSMENT & PLAN NOTE
Patient is prediabetic with a hemoglobin A1c of 5.7%  -Diet education  -No indication for insulin therapy at this time

## 2021-04-11 NOTE — ASSESSMENT & PLAN NOTE
Patient has chronic hypertension, uncontrolled  SBP 140s to 170s  -Continue home blood pressure medication amlodipine - would have like to try alternate regimen: lisinopril started  -Would consider DC amlodipine if can achieve goal <140/90  -Labetalol as needed with parameters

## 2021-04-11 NOTE — ASSESSMENT & PLAN NOTE
"Patient presents with multiple bilateral venous stasis and ulcerations with purulent drainage  -Most recent cultures were growing staph aureus, Enterococcus and Pseudomonas, given its sensitivity profile will continue patient on Zyvox and meropenem  -Blood cx on admission - no growth to date and wound cultures - S aureus (MRSA) and pseudomonas  -Supportive care with pain control  -LPS and ID consult appreciated   Per ID  \"No signs of acute infection  Will dc abx today  MRSA and Pseudomonas are likely colonizers  Continue wound care\"  "

## 2021-04-11 NOTE — ED NOTES
Med Rec completed: per patient at bedside  Preferred Pharmacy: [facility] on [address]  Allergies:  No Known Allergies    No ORAL antibiotics in last 14 days    Home Medications:  Medication Sig Comments   • meloxicam (MOBIC) 7.5 MG Tab Take 7.5 mg by mouth 1 time a day as needed (PAIN).    • amLODIPine (NORVASC) 10 MG Tab Take 1 tablet by mouth once daily     • atorvastatin (LIPITOR) 40 MG Tab Take 1 tablet by mouth once daily    • ascorbic acid (VITAMIN C) 500 MG tablet Take 1 tablet by mouth once daily     • sertraline (ZOLOFT) 100 MG Tab Take 1 Tab by mouth every day.    • amitriptyline (ELAVIL) 25 MG Tab Take 25 mg by mouth at bedtime as needed for sleep    • ferrous gluconate (FERGON) 324 (38 Fe) MG Tab Take 1 Tab by mouth every morning with breakfast.      **Patient reports he takes his meds daily but has not taken any of his medications for a few days.

## 2021-04-11 NOTE — PROGRESS NOTES
2 RN skin check complete with Shania ROGERS.   Devices in place: none  Skin assessed under devices: N/A  Confirmed pressure ulcers found on: none  New potential pressure ulcers noted on:none  Wound consult placed: yes  The following interventions in place: 2 RN skin check, pillow in use to support lower legs, frequent linen changes    Skin assessment:  Elbows: right is red, blanching; left is pink, blanching  Trunk: left side has large bruise  Sacrum: redness, blanching  Thighs: upper inner right side is red, barrire paste applied. Left lateral thigh has large bruise.  R LE: wounds - DTI to bottom of foot; right leg is red, excoriated, scabbing, some open wounds, adaptic and abdominal pad applied with gauze roll. Heel is red, flaky.   L LE: wounds - open, red, yellow, small drainage, excoriated, adaptic and abdominal pad applied with gauze roll. Heel is red and flaky.

## 2021-04-11 NOTE — ED PROVIDER NOTES
ED Provider Note     4/11/2021  12:07 AM    Means of Arrival: Walk In  History obtained by: patient  Limitations: none  PCP: Marcela Ch  CODE STATUS: Full    CHIEF COMPLAINT  Chief Complaint   Patient presents with   • Wound Check       HPI  Goran Chavez is a 65 y.o. male Charcot foot, hep C, hypertension, chronic lower extremity wounds bilaterally who presents with worsening wounds at his distal lower extremities.  He says he goes to wound care weekly and as home health nurse come to his residence twice weekly for dressing changes.  Wound center note from 4/7/2021 mentions that  had been asked to present to the ER for hospitalization for IV antibiotics and possible surgical debridement of wounds.  He was asked multiple times why he did not present on the seventh as he was instructed to do so and he has not given any explanation.  He cannot provide a reason why this evening he decided to come to the ER.    REVIEW OF SYSTEMS  Review of Systems   Constitutional: Negative for chills and fever.   HENT: Negative for sore throat.    Respiratory: Negative for cough and shortness of breath.    Cardiovascular: Negative for chest pain.   Gastrointestinal: Negative for abdominal pain, nausea and vomiting.   Genitourinary: Negative for dysuria.   Musculoskeletal: Negative for back pain.   Neurological: Negative for headaches.   All other systems reviewed and are negative.    See HPI for further details.    PAST MEDICAL HISTORY   has a past medical history of Anxiety, Charcot's joint of left foot, non-diabetic (3/21/2016), Hepatitis C, chronic (HCC), Hypertension, Kidney disease, Migraine, Polysubstance abuse (HCC) (3/8/2018), Tobacco use (4/18/2016), Ulcer of left lower extremity with necrosis of muscle (HCC) (3/21/2016), and Venous stasis ulcer ([facility]) (2017).    SOCIAL HISTORY  Social History     Tobacco Use   • Smoking status: Former Smoker     Packs/day: 0.00     Years: 48.00     Pack years: 0.00      Types: Cigarettes     Quit date: 2018     Years since quittin.3   • Smokeless tobacco: Never Used   • Tobacco comment: states he is using 7mg nicotine patch   Substance and Sexual Activity   • Alcohol use: No     Alcohol/week: 7.2 oz     Types: 12 Cans of beer per week     Comment: history of alcoholism    • Drug use: Yes     Types: Marijuana, Inhaled, Methamphetamines     Comment: MJ every day, intermittent meth use   • Sexual activity: Not on file       SURGICAL HISTORY   has a past surgical history that includes drain skin abscess simple (Left); tibia orif (Right, ); shoulder orif (Left, ); hand surgery (Left); irrigation & debridement ortho (Left, 2020); irrigation & debridement ortho (Left, 2020); irrigation & debridement ortho (Left, 2020); and irrigation & debridement ortho (Left, 2020).    CURRENT MEDICATIONS  Home Medications     Reviewed by Viktoriya Barrow (Pharmacy Tech) on 21 at 0127  Med List Status: Complete   Medication Last Dose Status   amitriptyline (ELAVIL) 25 MG Tab Few Days Ago Active   amLODIPine (NORVASC) 10 MG Tab Few Days Ago Active   ascorbic acid (VITAMIN C) 500 MG tablet Few Days Ago Active   atorvastatin (LIPITOR) 40 MG Tab Few Days Ago Active   ferrous gluconate (FERGON) 324 (38 Fe) MG Tab Few Days Ago Active   meloxicam (MOBIC) 7.5 MG Tab PRN Active   sertraline (ZOLOFT) 100 MG Tab Few Days Ago Active   Sodium Hypochlorite (DAKINS 0.125%, 1/4 STRENGTH,) 0.125 % Solution Not Taking Active                ALLERGIES  No Known Allergies    PHYSICAL EXAM  VITAL SIGNS: BP (!) 198/102   Pulse 79   Temp 36.7 °C (98 °F) (Temporal)   Resp 15   Ht 1.829 m (6')   Wt 78.9 kg (174 lb)   SpO2 98%   BMI 23.60 kg/m²     Pulse ox interpretation: I interpret this pulse ox as normal.  Constitutional: Alert in no apparent distress.  Pleasant 65-year-old man  HENT: No signs of trauma, Bilateral external ears normal, Nose normal.   Eyes:  Pupils are equal, Conjunctiva normal, Non-icteric.   Neck: Normal range of motion, No tenderness, Supple, No stridor.   Cardiovascular: Regular rate and rhythm, no murmurs. Symmetric distal pulses. No cyanosis of extremities. Mild edema bilaterally distal lower extremities.   Thorax & Lungs: Normal breath sounds, No respiratory distress, No wheezing, No chest tenderness.   Abdomen: Soft, No tenderness, No masses, No pulsatile masses. No peritoneal signs.  Skin: See lower extremity exam, diagram  Back: No midline bony tenderness, No CVA tenderness.   Musculoskeletal: Deformity at left foot, Charcot foot.  Scaling dry skin of 2 knees with erythema.  See diagram for wound descriptions  Neurologic: Alert , Normal motor function, Normal sensory function, No focal deficits noted.   Psychiatric: Affect normal, Judgment normal, Mood normal.   Physical Exam   Musculoskeletal:        Feet:          DIAGNOSTIC STUDIES / PROCEDURES      LABS  Pertinent Labs & Imaging studies reviewed. (See chart for details)    RADIOLOGY  Pertinent Labs & Imaging studies reviewed. (See chart for details)    COURSE & MEDICAL DECISION MAKING  Pertinent Labs & Imaging studies reviewed. (See chart for details)    12:07 AM This is an emergent evaluation of a  65 y.o. male with chronic nonhealing lower extremity wounds.  The wounds have been worsening.  He was referred to the ER by wound care 3 days ago and is showing up this evening.  He has no fevers.  His distal extremities were wrapped with dressing.  Dressing very dry but he says they were placed just yesterday.  Odorous, exudative discharge.  Anticipate hospitalization for further wound care, may need debridement.  I will put in a consult order for limb preservation team.  Anticipate hospitalization with hospital service once labs return.  Also consult with pharmacy regarding antibiotic therapy.      1:54 AM  White blood cell count 8.2.  BUN slightly elevated at 26.  Creatinine 1.5.  Low  albumin.  High CRP 13.2 likely reflective of wound infection.  I have consulted with hospitalist, Dr. Olmos.  She has agreed to hospitalize him for further wound care, consultation with limb preservation service.  Antibiotics Linezolid and meropenem given in the emergency department.        FINAL IMPRESSION    ICD-10-CM   1. Wound infection Active T14.8XXA    L08.9            This dictation was created using voice recognition software. The accuracy of the dictation is limited to the abilities of the software. I expect there may be some errors of grammar and possibly content. The nursing notes were reviewed and certain aspects of this information were incorporated into this note.    Electronically signed by: Justin Sanchez II, M.D., 4/11/2021 12:07 AM

## 2021-04-11 NOTE — PROGRESS NOTES
Received bedside report from night shift RN.   Assumed care of patient at change of shift.   Patient is A&Ox4, VSS, on RA.   Patient reports 7/10 pain in BLE - medicated with Tylenol per MAR.   Unable to assess BLE due to dressings in place; dressings are CDI.   Patient is resting in bed and is NPO at this time for possible surgical debridement of BLE.   Took meds this am with sips of water.  Bed is in lowest/locked position. Bed alarm is on.  Call light and belongings are within reach. No further needs at this time.

## 2021-04-11 NOTE — ED NOTES
Bandages removed on each lower leg. Pt states bandages were applied yesterday by home health nurse. Pt's right leg red and excoriated with weeping to lower foot, thick yellow/green drainage. Pt's left leg weeping from below knee and down with same thick yellow/green drainage. Wet bandages applied to BLE per ERP orders.

## 2021-04-11 NOTE — PROGRESS NOTES
Pt seen and examined by myself, admitted early am, see H and P for full details.    The patient is a 65-year-old male with a past medical history of Charcot's joint on the left foot, chronic lower extremity wounds secondary to venous stasis versus arterial insufficiency who presents to the ED with a chief complaint of bilateral lower extremity pain left more than right.  On physical examination the patient does have skin changes consistent with chronic venous insufficiency and large ulcers on his left lower extremity associated with purulent discharge concerning for peripheral arterial disease.  We are pending venous and arterial Dopplers of both legs.  Limb preservation service has been contacted to evaluate patient's need for surgery.  He has been started on broad-spectrum antibiotics based on previous cultures.  All labs and images reviewed.

## 2021-04-11 NOTE — ED TRIAGE NOTES
Chief Complaint   Patient presents with   • Wound Check     Patient to triage via hospital wheelchair. Patient states that he has bilateral foot infections with the Left foot being worse than the Right. He states that the infection began to get bad about 3 days ago. Ankles/feet wrapped in gauze, purulent drainage observed on dressings. /92   Pulse 81   Temp 36.7 °C (98 °F) (Temporal)   Resp 14   Ht 1.829 m (6')   Wt 78.9 kg (174 lb)   SpO2 98%   BMI 23.60 kg/m²

## 2021-04-11 NOTE — CARE PLAN
Problem: Communication  Goal: The ability to communicate needs accurately and effectively will improve  Outcome: PROGRESSING AS EXPECTED  Note: Intervention: oriented to unit and educated pt on use of call light  Outcome: pt calls when in need     Problem: Safety  Goal: Will remain free from falls  Outcome: PROGRESSING AS EXPECTED  Note: Intervention: fall precautions and hourly rounding in place  Outcome: pt remains free from falls at this time

## 2021-04-11 NOTE — CARE PLAN
Problem: Safety  Goal: Will remain free from falls  Outcome: PROGRESSING AS EXPECTED  Note: Patient is rated a high fall risk and also has a history of falls. Patient educated on calling before getting out of bed and verbalized understanding. Bed alarm is on.      Problem: Knowledge Deficit  Goal: Knowledge of disease process/condition, treatment plan, diagnostic tests, and medications will improve  Outcome: PROGRESSING AS EXPECTED  Note: Patient educated regarding plan of care and possible surgical debridement procedure. All questions answered at this time.

## 2021-04-11 NOTE — PROGRESS NOTES
Pt arrive to unit. Pt is A&Ox4. Pt is resting in bed, no signs of labored breathing. Reports 6/10 pain in bilat lower legs. Pt on RA. Call light & personal belongings within reach, bed in lowest position & locked, and bed alarm is on . Fall precautions in place and education provided on how to use call light. Pt updated on plan of care for the shift.

## 2021-04-11 NOTE — ASSESSMENT & PLAN NOTE
Provided IV Abx therapy on admission to 4/13  Given current clinical clinical picture, agrees with ID team recommendations  No signs of acute infection  DC Abx  MRSA and Pseudomonas are likely colonizers  Continue wound care

## 2021-04-11 NOTE — CONSULTS
LIMB PRESERVATION SERVICE CONSULT      REFERRED BY: Justin Sanchez M.D.    DATE OF CONSULTATION: 4/11/2021    REASON FOR CONSULT: Bilateral lower extremity wounds     HISTORY OF PRESENT ILLNESS: Goran Chavez is a 65 y.o.  with a past medical history that includes idiopathic neuropathy, left Charcot foot deformity, CHF, hepatitis C, hypertension, polysubstance abuse, venous insufficiency, chronic venous stasis ulcers requiring surgical debridement and wound VAC placement admitted 4/10/2021 for Wound infection [T14.8XXA, L08.9].   LPS has been consulted for evaluation of bilateral lower extremity wounds.  Patient reports he has had recurrent bilateral lower extremity wounds for several years.  His current wounds have been present since approximately March 2020.  He states that he has been going to outpatient wound care clinic weekly and also receives home health visits 2 times per week for dressing changes.  He states for the past 2-3 weeks, he has had an increase in redness, swelling, pain, drainage, odor to bilateral lower extremities, left side worse than right.  Previous wound cultures have been positive for Pseudomonas.  He completed a course of oral ciprofloxacin 3/17/2021-3/31/2021 and has been dealing bleach baths at home to help treat wounds..  He was seen at outpatient wound care clinic on 3/31/2021.  Per wound center note on 4/7/2021,, he was instructed to present to the emergency department for hospitalization, IV antibiotics, and possible surgical debridement of wounds.  He did not present at that time and was not able to provide an explanation as to why he did not come at that time.  He denies fever, chills, nausea, vomiting, chest pain, shortness of breath, diarrhea.   Additionally, patient has a left Charcot foot deformity in addition to bilateral 1-5 hammertoe deformities.  He was previously referred to limb preservation services rounds but did not show up for unknown reasons.  He also has  a dark purple discoloration with bogginess to his right plantar heel.  He states that his right heel has been hurting him for the last 2-3 weeks.  Pain is worse with walking.  There is no open wound and he denies increase in swelling, redness, or any drainage.  No other relieving or exacerbating factors.    IV antibiotics were started on this admission.  Infectious diseases has not been consulted.    Xray completed and is negative for osteomyelitis.  Ortho has not been consulted yet.    Patient denies history of diabetes and therefore he does not check his blood sugars.  Patient does have a history of neuropathy.  Patient normally wears nonprescriptive footwear and states he does not regularly perform foot checks.  Patient has had previous irrigation and debridements to his left lower extremity in April and May 2020 by Dr. Israel.        Smoking:   reports that he quit smoking about 2 years ago. His smoking use included cigarettes. He smoked 0.00 packs per day for 48.00 years. He has never used smokeless tobacco.    Alcohol:   reports no history of alcohol use.    Drug:   reports current drug use. Drugs: Marijuana, Inhaled, and Methamphetamines.      PAST MEDICAL HISTORY:   Past Medical History:   Diagnosis Date   • Anxiety    • Charcot's joint of left foot, non-diabetic 3/21/2016   • Hepatitis C, chronic (HCC)    • Hypertension    • Kidney disease    • Migraine    • Polysubstance abuse (HCC) 3/8/2018   • Tobacco use 4/18/2016   • Ulcer of left lower extremity with necrosis of muscle (HCC) 3/21/2016   • Venous stasis ulcer (HCC) 2017    bilateral lower extremity        PAST SURGICAL HISTORY:   Past Surgical History:   Procedure Laterality Date   • IRRIGATION & DEBRIDEMENT ORTHO Left 5/4/2020    Procedure: IRRIGATION AND DEBRIDEMENT, WOUND - LEG;  Surgeon: Barrie Israel M.D.;  Location: SURGERY [facility];  Service: Orthopedics   • IRRIGATION & DEBRIDEMENT ORTHO Left 4/27/2020    Procedure: IRRIGATION AND  DEBRIDEMENT, WOUND - LEG;  Surgeon: Pasquale John M.D.;  Location: SURGERY [facility];  Service: Orthopedics   • IRRIGATION & DEBRIDEMENT ORTHO Left 4/24/2020    Procedure: IRRIGATION AND DEBRIDEMENT, WOUND-FOOT;  Surgeon: Barrie Israel M.D.;  Location: SURGERY [facility];  Service: Orthopedics   • IRRIGATION & DEBRIDEMENT ORTHO Left 4/22/2020    Procedure: IRRIGATION AND DEBRIDEMENT, WOUND - LEG;  Surgeon: Barrie Israel M.D.;  Location: SURGERY [facility];  Service: Orthopedics   • TIBIA ORIF Right 1997   • SHOULDER ORIF Left 1997   • HAND SURGERY Left     due to infection   • MT DRAIN SKIN ABSCESS SIMPLE Left     leg, near the knee, remote       MEDICATIONS:   Current Facility-Administered Medications   Medication Dose   • senna-docusate (PERICOLACE or SENOKOT S) 8.6-50 MG per tablet 2 tablet  2 tablet    And   • polyethylene glycol/lytes (MIRALAX) PACKET 1 Packet  1 Packet    And   • magnesium hydroxide (MILK OF MAGNESIA) suspension 30 mL  30 mL    And   • bisacodyl (DULCOLAX) suppository 10 mg  10 mg   • heparin injection 5,000 Units  5,000 Units   • acetaminophen (Tylenol) tablet 650 mg  650 mg   • labetalol (NORMODYNE/TRANDATE) injection 10 mg  10 mg   • amLODIPine (NORVASC) tablet 10 mg  10 mg   • ascorbic acid (Vitamin C) tablet 500 mg  500 mg   • atorvastatin (LIPITOR) tablet 40 mg  40 mg   • ferrous gluconate (FERGON) tablet 324 mg  324 mg   • sertraline (Zoloft) tablet 100 mg  100 mg   • meropenem (MERREM) 500 mg in  mL IVPB  500 mg   • linezolid (ZYVOX) tablet 600 mg  600 mg   • Pharmacy Consult Request ...Pain Management Review 1 Each  1 Each   • oxyCODONE immediate-release (ROXICODONE) tablet 2.5 mg  2.5 mg    Or   • oxyCODONE immediate-release (ROXICODONE) tablet 5 mg  5 mg    Or   • morphine (pf) 4 mg/mL injection 2 mg  2 mg   • aspirin (ASA) chewable tab 81 mg  81 mg       ALLERGIES:  No Known Allergies     FAMILY HISTORY:   Family History   Problem Relation Age of  Onset   • Lung Disease Mother 70         from COPD   • Hypertension Mother    • Other Father 82         from brain aneurysm after fall   • Cancer Neg Hx    • Heart Disease Neg Hx    • Stroke Neg Hx    • Diabetes Neg Hx          REVIEW OF SYSTEMS:   Constitutional: Negative for chills, fever   Respiratory: Negative for cough and shortness of breath.    Cardiovascular:Negative for chest pain, and claudication.   Gastrointestinal: Negative for constipation, diarrhea, nausea and vomiting.   Lower extremities: positive for swelling, redness, pain bilateral lower extremities and right plantar heel  Neurological: positive for numbness to bilateral feet left greater than right  All other systems reviewed and are negative     RESULTS:     Recent Labs     21  003   WBC 8.2   RBC 4.29*   HEMOGLOBIN 13.0*   HEMATOCRIT 41.6*   MCV 97.0   MCH 30.3   MCHC 31.3*   RDW 60.7*   PLATELETCT 380   MPV 9.0     Recent Labs     21   SODIUM 137   POTASSIUM 4.3   CHLORIDE 109   CO2 21   GLUCOSE 91   BUN 26*         ESR:     Results from last 7 days   Lab Units 21  0032   SED RATE WESTERGREN 1526 mm/hour 116*       CRP:       Results from last 7 days   Lab Units 21   C REACTIVE PROTEIN 4596 mg/dL 13.20*         COVID-19: Not detected this admission    X-ray:   DX-foot-complete 3+ left 2021  IMPRESSION:     1.  Osteoarthritis of the midfoot and forefoot     2.  Flattening of the longitudinal arch     3.  Multiple hammertoe deformities    DX-foot-complete 3+ right 2021  IMPRESSION:     1.  No radiographic evidence for calcaneal osteomyelitis     2.  osteoarthritis of the midfoot, forefoot, tibiotalar joint     3.  Multiple hammertoe deformities      MRI: None this admission    Arterial studies: None this admission    A1c:  Lab Results   Component Value Date/Time    HBA1C 5.7 (H) 2020 11:22 AM            Microbiology:  Results     Procedure Component Value Units Date/Time    GRAM STAIN  "[[id number]] Collected: 04/11/21 0330    Order Status: Completed Specimen: Wound Updated: 04/11/21 1503     Significant Indicator .     Source WND     Site LEFT LEG     Gram Stain Result Few WBCs.  Moderate Gram positive cocci.      Narrative:      Collected By:ALBERTA PAREDES  Collected By:ALBERTA PAREDES    CULTURE WOUND W/ GRAM STAIN [[id number]] Collected: 04/11/21 0330    Order Status: Completed Specimen: Wound from Left Leg Updated: 04/11/21 1503     Significant Indicator NEG     Source WND     Site LEFT LEG     Culture Result -     Gram Stain Result Few WBCs.  Moderate Gram positive cocci.      Narrative:      Collected By:ALBERTA PAREDES  Collected By:ALBERTA PAREDES    SARS-CoV-2 PCR (24 hour In-House): Collect NP swab in [facility] [[id number]] Collected: 04/11/21 0106    Order Status: Completed Specimen: Respirate from Nasopharyngeal Updated: 04/11/21 0114     SARS-CoV-2 Source NP Swab    Narrative:      Have you been in close contact with a person who is suspected  or known to be positive for COVID-19 within the last 30 days  (e.g. last seen that person < 30 days ago)->No    BLOOD CULTURE [[id number]] Collected: 04/11/21 0046    Order Status: Completed Specimen: Blood from Peripheral Updated: 04/11/21 0104    Narrative:      2 of 2 blood culture x2  Sites order. Per Hospital Policy:  Only change Specimen Src: to \"Line\" if specified by physician  order.    BLOOD CULTURE [[id number]] Collected: 04/11/21 0035    Order Status: Completed Specimen: Blood from Peripheral Updated: 04/11/21 0043    Narrative:      1 of 2 for Blood Culture x 2 sites order. Per Hospital  Policy: Only change Specimen Src: to \"Line\" if specified by  physician order.           PHYSICAL EXAMINATION:     VITAL SIGNS: /79   Pulse 76   Temp 36.7 °C (98 °F) (Temporal)   Resp 18   Ht 1.829 m (6')   Wt 80.2 kg (176 lb 12.9 oz)   SpO2 94%   BMI 23.98 kg/m²       General Appearance:  Well developed, well nourished, disheveled male in " no acute distress      Lower Extremity Assessment:    Edema:   3+ edema      ROM dorsi/plantarflexion   Dorsiflexion intact reduced bilateral ankles    Structural /mechanical changes:  Left Charcot foot deformity and bilateral 1-5 tight hammertoes    Sensory Assessment  Feet Insensate to light touch bilateral feet      Pulses:  R foot: +1 DP, +1 PT, biphasic DP/PT tones are via Doppler  L foot: +1 DP, +1 PT, biphasic DP/PT tones are via Doppler      Wound Assessment:    Left lower extremity:  Multiple full-thickness wounds to left medial, posterior, lateral lower leg/ankle.  Thick yellow slough present to wounds.  Tender to touch.  Edges are attached.  Odor emanating from wounds.  Hemosiderin staining present until pretibial area.    Charcot foot deformity to left foot.    Left posterior medial lower extremity    Left posterior lower extremity    Left lateral lower extremity      Right lower extremity with full-thickness wounds to right lateral and posterior lower extremity.  There is a thin scabbing as well as dried flaky skin circumferentially.  Hemosiderin staining present to pretibial area.  Right plantar heel has approximately 2 cm x 3 cm area of dark purple discoloration.  There is no open wound, surrounding erythema, edema, or drainage.  Slight bogginess to palpation.    Right anterior lower extremity/dorsal foot      Right posterior lower extremity      Right plantar foot            Wound care completed by Ramona wound care RN.    Heel Mepilex to right heel.  To be changed every 72 hours or as needed dislodgment.  Bilateral lower Legs: Nursing to remove old dressing. Cleanse wound with NS or wound cleanser and gauze. Pat dry. . Cut a piece of Clear Petrolatum gauze and apply over open wound area. Secure in place with  ABD pads, roll gauze and hypafix tape and tubi  F Nursing to change daily and PRN dislodgement and or drainage saturation.        ASSESSMENT AND PLAN:   65-year-old male with a history of  idiopathic neuropathy and chronic venous ulcerations who presents with bilateral lower extremity cellulitis left greater than right.  Patient was seen with Ramona wound care RN.  Recommend surgical evaluation for possible irrigation and debridement and wound VAC placement.  Also requesting orthopedic consult for left Charcot foot deformity and hammertoe deformities.  In the meantime, recommend offloading, antibiotic therapy, localized wound care.      Wound care:   -Wound care orders placed for nursing  Heel Mepilex to right heel.  To be changed every 72 hours or as needed dislodgment.  Bilateral lower Legs: Nursing to remove old dressing. Cleanse wound with NS or wound cleanser and gauze. Pat dry. . Cut a piece of Clear Petrolatum gauze and apply over open wound area. Secure in place with  ABD pads, roll gauze and hypafix tape and tubi  F Nursing to change daily and PRN dislodgement and or drainage saturation.      Imaging/Labs:  -COVID-19: not detected this admission    Vascular status:   -Palpable pedal pulses bilaterally    Surgery:   -N.p.o. at midnight for orthopedic surgical consult    Antibiotics:   Patient on meropenem IV and oral linezolid  -currently on antibiotics managed by hospitalist       Weight Bearing Status:   -Weight bearing as tolerated    Offloading:   -Offloading shoe; ordered  -Orthotic company: None    PT/OT:   None at this time      Neuropathy    - Implications of loss of protective sensation (LOPS) discussed with patient- including increased risk for amputation.  Advised to check feet at least daily, moisturize feet, and to always wear protective foot wear.   -avoid trimming own nails. See podiatrist or certified foot and nail RN        D/W: pt, Ramona wound care RN, bedside RN, Dr. Aponte      Discharge Plan:  Remain inpatient for ongoing medical care.  Patient n.p.o. midnight for orthopedic surgical consultation.    Follow up: TBD        Please note that this dictation was created  using voice recognition software. I have  worked with technical experts from [facility] to optimize the interface.  I have made every reasonable attempt to correct obvious errors, but there may be errors of grammar and possibly content that I did not discover before finalizing the note.    Please contact LPS through [employer].

## 2021-04-11 NOTE — H&P
Hospital Medicine History & Physical Note    Date of Service  4/11/2021    Primary Care Physician  Marcela Ch M.D.    Consultants  LPS    Code Status  Full Code    Chief Complaint  Chief Complaint   Patient presents with   • Wound Check       History of Presenting Illness  65 y.o. male with a past medical history of Charcot's joint of the left foot, chronic lower extremity wounds secondary to venous stasis and history of hypertension who presented 4/10/2021 with increasing bilateral lower extremity pain and worsening bilateral wounds with increasing drainage.  Patient follows with outpatient wound care, per the chart it was recommended that he is go to the ER on 4/7/2021 for IV antibiotics and possible surgical intervention.  He had been treated with oral Cipro and completed this regimen on 3/31/2021, he states he had no improvement while on this.  He denies fever or chills but overall feels more fatigued.  He denies chest pain or shortness of breath, he denies nausea vomiting or diarrhea.     In the ER, temp is 98, HR 81, RR 14, /92, saturating well on room air.  Labs are remarkable for normal WBC, H&H 13/41.6 electrolytes and renal function are relatively unremarkable.  Lactic acid of 1.7, elevated CRP of 13.2.     Previous wound cultures did grow staph aureus and Pseudomonas as well as Enterococcus faecalis.       Review of Systems  Review of Systems   Constitutional: Positive for chills and malaise/fatigue. Negative for fever.   HENT: Negative for congestion and sore throat.    Eyes: Negative for blurred vision.   Respiratory: Negative for cough and shortness of breath.    Cardiovascular: Positive for leg swelling. Negative for chest pain and orthopnea.   Gastrointestinal: Negative for abdominal pain, constipation, diarrhea, nausea and vomiting.   Genitourinary: Negative for dysuria and urgency.   Musculoskeletal: Negative for myalgias.   Neurological: Positive for weakness (gemeralized). Negative  for dizziness, sensory change, speech change, focal weakness and headaches.   Psychiatric/Behavioral: Positive for depression. Negative for substance abuse. The patient is not nervous/anxious.        Past Medical History   has a past medical history of Anxiety, Charcot's joint of left foot, non-diabetic (3/21/2016), Hepatitis C, chronic ([facility]), Hypertension, Kidney disease, Migraine, Polysubstance abuse ([facility]) (3/8/2018), Tobacco use (4/18/2016), Ulcer of left lower extremity with necrosis of muscle ([facility]) (3/21/2016), and Venous stasis ulcer ([facility]) (2017).    Surgical History   has a past surgical history that includes pr drain skin abscess simple (Left); tibia orif (Right, 1997); shoulder orif (Left, 1997); hand surgery (Left); irrigation & debridement ortho (Left, 4/22/2020); irrigation & debridement ortho (Left, 4/24/2020); irrigation & debridement ortho (Left, 4/27/2020); and irrigation & debridement ortho (Left, 5/4/2020).     Family History  family history includes Hypertension in his mother; Lung Disease (age of onset: 70) in his mother; Other (age of onset: 82) in his father.     Social History   reports that he quit smoking about 2 years ago. His smoking use included cigarettes. He smoked 0.00 packs per day for 48.00 years. He has never used smokeless tobacco. He reports current drug use. Drugs: Marijuana, Inhaled, and Methamphetamines. He reports that he does not drink alcohol.    Allergies  No Known Allergies    Medications  Prior to Admission Medications   Prescriptions Last Dose Informant Patient Reported? Taking?   Sodium Hypochlorite (DAKINS 0.125%, 1/4 STRENGTH,) 0.125 % Solution Not Taking at Not Taking Patient No No   Sig: Apply 30 mL topically every day.   Patient not taking: Reported on 4/11/2021   amLODIPine (NORVASC) 10 MG Tab Few Days Ago at [facility] Patient No No   Sig: Take 1 tablet by mouth once daily   Patient taking differently: Take 10 mg by mouth every day.   amitriptyline (ELAVIL) 25 MG Tab  Few Days Ago at  Patient No No   Sig: TAKE 1 TABLET BY MOUTH AT BEDTIME AS NEEDED FOR SLEEP (INCREASED DOSE)   Patient taking differently: Take 25 mg by mouth at bedtime as needed.   ascorbic acid (VITAMIN C) 500 MG tablet Few Days Ago at [facility] Patient No No   Sig: Take 1 tablet by mouth once daily   Patient taking differently: Take 500 mg by mouth every day.   atorvastatin (LIPITOR) 40 MG Tab Few Days Ago at [facility] Patient No No   Sig: Take 1 tablet by mouth once daily   Patient taking differently: Take 40 mg by mouth every day.   ferrous gluconate (FERGON) 324 (38 Fe) MG Tab Few Days Ago at [facility] Patient No No   Sig: Take 1 Tab by mouth every morning with breakfast.   meloxicam (MOBIC) 7.5 MG Tab PRN at [facility] Patient No No   Sig: TAKE 1 TABLET BY MOUTH ONCE DAILY AS NEEDED FOR MODERATE PAIN   Patient taking differently: Take 7.5 mg by mouth 1 time a day as needed (PAIN).   sertraline (ZOLOFT) 100 MG Tab Few Days Ago at [facility] Patient No No   Sig: Take 1 Tab by mouth every day.      Facility-Administered Medications: None       Physical Exam  Temp:  [36.7 °C (98 °F)] 36.7 °C (98 °F)  Pulse:  [72-91] 72  Resp:  [14-21] 16  BP: (147-209)/() 170/91  SpO2:  [96 %-98 %] 97 %    Physical Exam  Vitals and nursing note reviewed.   Constitutional:       General: He is not in acute distress.     Appearance: He is ill-appearing. He is not toxic-appearing.      Comments: Thin, disheveled   HENT:      Head: Normocephalic and atraumatic.      Comments: Bitemporal wasting     Mouth/Throat:      Mouth: Mucous membranes are dry.      Pharynx: Oropharynx is clear.      Comments: Edentulous on top, poor oral dentition  Eyes:      Extraocular Movements: Extraocular movements intact.      Conjunctiva/sclera: Conjunctivae normal.   Cardiovascular:      Rate and Rhythm: Normal rate and regular rhythm.      Heart sounds: No murmur.   Pulmonary:      Effort: Pulmonary effort is normal. No respiratory distress.      Breath sounds: Normal breath  sounds. No wheezing or rales.   Abdominal:      General: Bowel sounds are normal. There is no distension.      Palpations: Abdomen is soft.      Tenderness: There is no guarding or rebound.   Musculoskeletal:         General: Swelling, tenderness, deformity and signs of injury present.      Cervical back: Normal range of motion.      Right lower leg: Edema present.      Left lower leg: Edema present.   Skin:     General: Skin is warm.      Capillary Refill: Capillary refill takes 2 to 3 seconds.      Findings: Erythema and lesion present.      Comments: Multiple skin wounds to bilateral lower extremities with purulent yellow-green drainage   Neurological:      General: No focal deficit present.      Mental Status: He is alert and oriented to person, place, and time. Mental status is at baseline.   Psychiatric:         Mood and Affect: Mood normal.         Behavior: Behavior normal.         Thought Content: Thought content normal.      Comments: Flat affect         Laboratory:  Recent Labs     04/11/21  0032   WBC 8.2   RBC 4.29*   HEMOGLOBIN 13.0*   HEMATOCRIT 41.6*   MCV 97.0   MCH 30.3   MCHC 31.3*   RDW 60.7*   PLATELETCT 380   MPV 9.0     Recent Labs     04/11/21  0032   SODIUM 137   POTASSIUM 4.3   CHLORIDE 109   CO2 21   GLUCOSE 91   BUN 26*   CREATININE 1.05   CALCIUM 8.1*     Recent Labs     04/11/21  0032   ALTSGPT 21   ASTSGOT 34   ALKPHOSPHAT 189*   TBILIRUBIN 0.4   GLUCOSE 91         No results for input(s): NTPROBNP in the last 72 hours.      No results for input(s): TROPONINT in the last 72 hours.    Imaging:  No orders to display         Assessment/Plan:  I anticipate this patient will require at least two midnights for appropriate medical management, necessitating inpatient admission.    * Cellulitis- (present on admission)  Assessment & Plan  Bilateral lower extremity cellulitis, superimposed on chronic venous stasis ulcerations  -Has failed outpatient treatment with wound care and oral  ciprofloxacin  -Blood cultures and wound cultures ordered  -Start Zyvox plus meropenem per his most recent wound culture and sensitivities  -May need ID consult and surgical debridement  -LPS ordered  -N.p.o.      Venous stasis ulcer of left lower extremity (HCC)- (present on admission)  Assessment & Plan  Patient presents with multiple bilateral venous stasis and ulcerations with purulent drainage  -Most recent cultures were growing staph aureus, Enterococcus and Pseudomonas, given its sensitivity profile will continue patient on Zyvox and meropenem  -Blood and wound cultures ordered  -We will likely need ID consult  -LPS consult placed  -May need surgical debridement, npo  -Supportive care with pain control    Essential hypertension- (present on admission)  Assessment & Plan  Patient has chronic hypertension, uncontrolled  SBP 140s to 170s  -Continue home blood pressure medication amlodipine, may benefit from a alternate medication choice given amlodipine can worsen lower extremity edema which in fact may make his leg wounds worse  -Labetalol as needed with parameters    History of prediabetes- (present on admission)  Assessment & Plan  Patient is prediabetic with a hemoglobin A1c of 5.7%  -Diet education  -No indication for insulin therapy at this time    History of hepatitis C- (present on admission)  Assessment & Plan  History of, recently nondetected    Macrocytic anemia- (present on admission)  Assessment & Plan  Microcytic anemia, consistent with baseline  -Continue to monitor

## 2021-04-12 ENCOUNTER — APPOINTMENT (OUTPATIENT)
Dept: RADIOLOGY | Facility: MEDICAL CENTER | Age: 66
DRG: 603 | End: 2021-04-12
Attending: SURGERY
Payer: MEDICARE

## 2021-04-12 PROBLEM — I73.9 PERIPHERAL ARTERY DISEASE (HCC): Status: ACTIVE | Noted: 2021-04-12

## 2021-04-12 LAB
AMPHET UR QL SCN: POSITIVE
ANION GAP SERPL CALC-SCNC: 6 MMOL/L (ref 7–16)
BARBITURATES UR QL SCN: NEGATIVE
BASOPHILS # BLD AUTO: 0.9 % (ref 0–1.8)
BASOPHILS # BLD: 0.05 K/UL (ref 0–0.12)
BENZODIAZ UR QL SCN: NEGATIVE
BUN SERPL-MCNC: 21 MG/DL (ref 8–22)
BZE UR QL SCN: NEGATIVE
CALCIUM SERPL-MCNC: 7.4 MG/DL (ref 8.5–10.5)
CANNABINOIDS UR QL SCN: POSITIVE
CHLORIDE SERPL-SCNC: 110 MMOL/L (ref 96–112)
CO2 SERPL-SCNC: 19 MMOL/L (ref 20–33)
CREAT SERPL-MCNC: 0.94 MG/DL (ref 0.5–1.4)
EOSINOPHIL # BLD AUTO: 0.35 K/UL (ref 0–0.51)
EOSINOPHIL NFR BLD: 6.2 % (ref 0–6.9)
ERYTHROCYTE [DISTWIDTH] IN BLOOD BY AUTOMATED COUNT: 60.8 FL (ref 35.9–50)
GLUCOSE SERPL-MCNC: 85 MG/DL (ref 65–99)
GRAM STN SPEC: NORMAL
HCT VFR BLD AUTO: 35.6 % (ref 42–52)
HGB BLD-MCNC: 11.1 G/DL (ref 14–18)
IMM GRANULOCYTES # BLD AUTO: 0.03 K/UL (ref 0–0.11)
IMM GRANULOCYTES NFR BLD AUTO: 0.5 % (ref 0–0.9)
LYMPHOCYTES # BLD AUTO: 0.82 K/UL (ref 1–4.8)
LYMPHOCYTES NFR BLD: 14.5 % (ref 22–41)
MAGNESIUM SERPL-MCNC: 2 MG/DL (ref 1.5–2.5)
MCH RBC QN AUTO: 30.3 PG (ref 27–33)
MCHC RBC AUTO-ENTMCNC: 31.2 G/DL (ref 33.7–35.3)
MCV RBC AUTO: 97.3 FL (ref 81.4–97.8)
METHADONE UR QL SCN: NEGATIVE
MONOCYTES # BLD AUTO: 0.79 K/UL (ref 0–0.85)
MONOCYTES NFR BLD AUTO: 14 % (ref 0–13.4)
NEUTROPHILS # BLD AUTO: 3.61 K/UL (ref 1.82–7.42)
NEUTROPHILS NFR BLD: 63.9 % (ref 44–72)
NRBC # BLD AUTO: 0 K/UL
NRBC BLD-RTO: 0 /100 WBC
OPIATES UR QL SCN: NEGATIVE
OXYCODONE UR QL SCN: POSITIVE
PCP UR QL SCN: NEGATIVE
PHOSPHATE SERPL-MCNC: 2.6 MG/DL (ref 2.5–4.5)
PLATELET # BLD AUTO: 347 K/UL (ref 164–446)
PMV BLD AUTO: 9.5 FL (ref 9–12.9)
POTASSIUM SERPL-SCNC: 4.5 MMOL/L (ref 3.6–5.5)
PROPOXYPH UR QL SCN: NEGATIVE
RBC # BLD AUTO: 3.66 M/UL (ref 4.7–6.1)
SIGNIFICANT IND 70042: NORMAL
SITE SITE: NORMAL
SODIUM SERPL-SCNC: 135 MMOL/L (ref 135–145)
SOURCE SOURCE: NORMAL
WBC # BLD AUTO: 5.7 K/UL (ref 4.8–10.8)

## 2021-04-12 PROCEDURE — 75635 CT ANGIO ABDOMINAL ARTERIES: CPT

## 2021-04-12 PROCEDURE — A9270 NON-COVERED ITEM OR SERVICE: HCPCS | Performed by: STUDENT IN AN ORGANIZED HEALTH CARE EDUCATION/TRAINING PROGRAM

## 2021-04-12 PROCEDURE — 700111 HCHG RX REV CODE 636 W/ 250 OVERRIDE (IP): Performed by: STUDENT IN AN ORGANIZED HEALTH CARE EDUCATION/TRAINING PROGRAM

## 2021-04-12 PROCEDURE — 700111 HCHG RX REV CODE 636 W/ 250 OVERRIDE (IP): Performed by: INTERNAL MEDICINE

## 2021-04-12 PROCEDURE — 700105 HCHG RX REV CODE 258: Performed by: STUDENT IN AN ORGANIZED HEALTH CARE EDUCATION/TRAINING PROGRAM

## 2021-04-12 PROCEDURE — 700117 HCHG RX CONTRAST REV CODE 255: Performed by: SURGERY

## 2021-04-12 PROCEDURE — 700102 HCHG RX REV CODE 250 W/ 637 OVERRIDE(OP): Performed by: INTERNAL MEDICINE

## 2021-04-12 PROCEDURE — 80048 BASIC METABOLIC PNL TOTAL CA: CPT

## 2021-04-12 PROCEDURE — 85025 COMPLETE CBC W/AUTO DIFF WBC: CPT

## 2021-04-12 PROCEDURE — 84100 ASSAY OF PHOSPHORUS: CPT

## 2021-04-12 PROCEDURE — 93922 UPR/L XTREMITY ART 2 LEVELS: CPT

## 2021-04-12 PROCEDURE — 80307 DRUG TEST PRSMV CHEM ANLYZR: CPT

## 2021-04-12 PROCEDURE — 700102 HCHG RX REV CODE 250 W/ 637 OVERRIDE(OP): Performed by: STUDENT IN AN ORGANIZED HEALTH CARE EDUCATION/TRAINING PROGRAM

## 2021-04-12 PROCEDURE — 83735 ASSAY OF MAGNESIUM: CPT

## 2021-04-12 PROCEDURE — 99223 1ST HOSP IP/OBS HIGH 75: CPT | Mod: GC | Performed by: INTERNAL MEDICINE

## 2021-04-12 PROCEDURE — 770006 HCHG ROOM/CARE - MED/SURG/GYN SEMI*

## 2021-04-12 PROCEDURE — A9270 NON-COVERED ITEM OR SERVICE: HCPCS | Performed by: INTERNAL MEDICINE

## 2021-04-12 PROCEDURE — 99232 SBSQ HOSP IP/OBS MODERATE 35: CPT | Performed by: INTERNAL MEDICINE

## 2021-04-12 PROCEDURE — 36415 COLL VENOUS BLD VENIPUNCTURE: CPT

## 2021-04-12 RX ORDER — CEFAZOLIN SODIUM 2 G/100ML
2 INJECTION, SOLUTION INTRAVENOUS EVERY 8 HOURS
Status: DISCONTINUED | OUTPATIENT
Start: 2021-04-12 | End: 2021-04-13

## 2021-04-12 RX ADMIN — AMLODIPINE BESYLATE 10 MG: 10 TABLET ORAL at 06:38

## 2021-04-12 RX ADMIN — OXYCODONE 5 MG: 5 TABLET ORAL at 15:52

## 2021-04-12 RX ADMIN — ASPIRIN 81 MG: 81 TABLET, CHEWABLE ORAL at 06:38

## 2021-04-12 RX ADMIN — MEROPENEM 500 MG: 500 INJECTION, POWDER, FOR SOLUTION INTRAVENOUS at 00:11

## 2021-04-12 RX ADMIN — OXYCODONE 5 MG: 5 TABLET ORAL at 06:44

## 2021-04-12 RX ADMIN — OXYCODONE 5 MG: 5 TABLET ORAL at 11:16

## 2021-04-12 RX ADMIN — FERROUS GLUCONATE 324 MG: 324 TABLET ORAL at 09:16

## 2021-04-12 RX ADMIN — LINEZOLID 600 MG: 600 TABLET, FILM COATED ORAL at 00:15

## 2021-04-12 RX ADMIN — MORPHINE SULFATE 2 MG: 4 INJECTION INTRAVENOUS at 21:30

## 2021-04-12 RX ADMIN — CEFAZOLIN SODIUM 2 G: 2 INJECTION, SOLUTION INTRAVENOUS at 14:43

## 2021-04-12 RX ADMIN — OXYCODONE 5 MG: 5 TABLET ORAL at 19:54

## 2021-04-12 RX ADMIN — CEFAZOLIN SODIUM 2 G: 2 INJECTION, SOLUTION INTRAVENOUS at 21:24

## 2021-04-12 RX ADMIN — ATORVASTATIN CALCIUM 40 MG: 40 TABLET, FILM COATED ORAL at 16:59

## 2021-04-12 RX ADMIN — SERTRALINE HYDROCHLORIDE 100 MG: 100 TABLET, FILM COATED ORAL at 06:42

## 2021-04-12 RX ADMIN — MEROPENEM 500 MG: 500 INJECTION, POWDER, FOR SOLUTION INTRAVENOUS at 06:39

## 2021-04-12 RX ADMIN — MEROPENEM 500 MG: 500 INJECTION, POWDER, FOR SOLUTION INTRAVENOUS at 11:29

## 2021-04-12 RX ADMIN — OXYCODONE HYDROCHLORIDE AND ACETAMINOPHEN 500 MG: 500 TABLET ORAL at 06:38

## 2021-04-12 RX ADMIN — HEPARIN SODIUM 5000 UNITS: 5000 INJECTION, SOLUTION INTRAVENOUS; SUBCUTANEOUS at 14:43

## 2021-04-12 RX ADMIN — IOHEXOL 100 ML: 350 INJECTION, SOLUTION INTRAVENOUS at 19:27

## 2021-04-12 RX ADMIN — DOCUSATE SODIUM 50 MG AND SENNOSIDES 8.6 MG 2 TABLET: 8.6; 5 TABLET, FILM COATED ORAL at 06:38

## 2021-04-12 ASSESSMENT — ENCOUNTER SYMPTOMS
WHEEZING: 0
SHORTNESS OF BREATH: 0
DEPRESSION: 0
BLURRED VISION: 0
BACK PAIN: 0
DIARRHEA: 0
NAUSEA: 0
WEAKNESS: 0
VOMITING: 0
SPEECH CHANGE: 0
CHILLS: 0
PHOTOPHOBIA: 0
MYALGIAS: 1
HEADACHES: 0
FEVER: 0
BRUISES/BLEEDS EASILY: 0
SORE THROAT: 0
DOUBLE VISION: 0
COUGH: 0
HEARTBURN: 0
PALPITATIONS: 0
ORTHOPNEA: 0
DIZZINESS: 0
EYE DISCHARGE: 0
CLAUDICATION: 0
SENSORY CHANGE: 0
ABDOMINAL PAIN: 0
FLANK PAIN: 0
BLOOD IN STOOL: 0
SPUTUM PRODUCTION: 0
HEMOPTYSIS: 0

## 2021-04-12 ASSESSMENT — PAIN DESCRIPTION - PAIN TYPE
TYPE: ACUTE PAIN

## 2021-04-12 NOTE — PROGRESS NOTES
Patient AAO x 4. Medicated for pain according to the MAR. Discussed POC and pending exams. Discussed current NPO status. Discussed dressing change. Patient states mobility has been decreased due to pain but normally he is up independently. He states he sees outpatient wound but was noticing a worsening of his lower extremeties. Bed is locked and in low position, call light in reach.

## 2021-04-12 NOTE — RESPIRATORY CARE
COPD EDUCATION by COPD CLINICAL EDUCATOR  4/12/2021 at 9:01 AM by Vale Morillo, RRT     Patient reviewed by COPD education team. Patient does not have a history or diagnosis of COPD. Therefore does not qualify for the COPD program.

## 2021-04-12 NOTE — ASSESSMENT & PLAN NOTE
Arterial Dopplers of the left lower extremity suggest peripheral arterial disease  Continue aspirin and statin  Vascular surgery recommendations appreciated - ABIs and TBIs normal; CTA is unimpressive (some femoral plaque).  -No need for revascularization at this point.

## 2021-04-12 NOTE — PROGRESS NOTES
Pt A&OX4, presents with a flat affect. Pt on RA.  Pt complains of 8/10 pain in the lower extremities, medicated per MAR.. POC discussed.  Pt expected to go to surgery in th morning, pt made aware of NPO status at midnight. Denies further needs at this time. Bed locked and in lowest position. Bed alarm on, call light within reach & hourly rounding in place

## 2021-04-12 NOTE — PROGRESS NOTES
Hospital Medicine Daily Progress Note    Date of Service  4/12/2021    Chief Complaint  65 y.o. male admitted 4/10/2021 with left leg pain    Hospital Course    65 y.o. male with a past medical history of Charcot's joint of the left foot, chronic lower extremity wounds secondary to venous stasis and history of hypertension who presented 4/10/2021 with increasing bilateral lower extremity pain and worsening bilateral wounds with increasing drainage.  Patient follows with outpatient wound care, per the chart it was recommended that he is go to the ER on 4/7/2021 for IV antibiotics and possible surgical intervention.  He had been treated with oral Cipro and completed this regimen on 3/31/2021, he states he had no improvement while on this.  He denies fever or chills but overall feels more fatigued.  He denies chest pain or shortness of breath, he denies nausea vomiting or diarrhea.      In the ER, temp is 98, HR 81, RR 14, /92, saturating well on room air.  Labs are remarkable for normal WBC, H&H 13/41.6 electrolytes and renal function are relatively unremarkable.  Lactic acid of 1.7, elevated CRP of 13.2.      Previous wound cultures did grow staph aureus and Pseudomonas as well as Enterococcus faecalis.     Interval Problem Update    4/11/21:    The patient is a 65-year-old male with a past medical history of Charcot's joint on the left foot, chronic lower extremity wounds secondary to venous stasis versus arterial insufficiency who presents to the ED with a chief complaint of bilateral lower extremity pain left more than right.  On physical examination the patient does have skin changes consistent with chronic venous insufficiency and large ulcers on his left lower extremity associated with purulent discharge concerning for peripheral arterial disease.  We are pending venous and arterial Dopplers of both legs.  Limb preservation service has been contacted to evaluate patient's need for surgery.  He has been  started on broad-spectrum antibiotics based on previous cultures.  All labs and images reviewed.    4/12/2021:    The patient was seen and examined at bedside and appears comfortable.  Left lower extremity arterial Dopplers are consistent with peripheral arterial disease.  He has been started on aspirin and statin therapy.  Pending vascular surgery recommendations.  Patient evaluated by limb preservation service who is in communication with Ortho for possible debridement.  He continues to show interval improvement on current antibiotic therapy pending further infectious disease recommendations.  Patient denies any chest pains, palpitations, shortness of breath.  No other overnight events reported by nursing staff.        Consultants/Specialty  ID  Vascular surgery  Limb preservation services    Code Status  Full Code    Disposition  Pending Ortho evaluation for possible debridement as well as vascular evaluation.    Review of Systems  Review of Systems   Constitutional: Negative for chills, fever and malaise/fatigue.   HENT: Negative for congestion, hearing loss, sore throat and tinnitus.    Eyes: Negative for blurred vision, double vision, photophobia and discharge.   Respiratory: Negative for cough, hemoptysis, sputum production, shortness of breath and wheezing.    Cardiovascular: Positive for leg swelling. Negative for chest pain, palpitations, orthopnea and claudication.   Gastrointestinal: Negative for abdominal pain, blood in stool, diarrhea, heartburn, melena, nausea and vomiting.   Genitourinary: Negative for dysuria, flank pain, hematuria and urgency.   Musculoskeletal: Positive for myalgias. Negative for back pain and joint pain.   Skin: Negative for itching and rash.   Neurological: Negative for dizziness, sensory change, speech change, weakness and headaches.   Endo/Heme/Allergies: Does not bruise/bleed easily.   Psychiatric/Behavioral: Negative for depression and suicidal ideas.        Physical  Exam  Temp:  [36.7 °C (98 °F)-37.1 °C (98.8 °F)] 37.1 °C (98.8 °F)  Pulse:  [76-81] 76  Resp:  [16-18] 18  BP: (123-158)/(79-95) 158/94  SpO2:  [93 %-95 %] 94 %    Physical Exam  Vitals reviewed.   Constitutional:       Appearance: Normal appearance. He is obese.   HENT:      Head: Normocephalic and atraumatic.      Nose: No congestion or rhinorrhea.      Mouth/Throat:      Mouth: Mucous membranes are moist.      Pharynx: Oropharynx is clear.   Eyes:      General: No scleral icterus.     Extraocular Movements: Extraocular movements intact.      Conjunctiva/sclera: Conjunctivae normal.      Pupils: Pupils are equal, round, and reactive to light.   Cardiovascular:      Rate and Rhythm: Normal rate and regular rhythm.      Heart sounds: No murmur. No friction rub. No gallop.    Pulmonary:      Effort: Pulmonary effort is normal. No respiratory distress.      Breath sounds: Normal breath sounds. No stridor. No wheezing, rhonchi or rales.   Abdominal:      General: Abdomen is flat. Bowel sounds are normal. There is no distension.      Palpations: Abdomen is soft. There is no mass.      Tenderness: There is no abdominal tenderness. There is no guarding or rebound.   Musculoskeletal:         General: No swelling, tenderness or deformity.      Cervical back: Neck supple. No rigidity. No muscular tenderness.   Lymphadenopathy:      Cervical: No cervical adenopathy.   Skin:     General: Skin is warm and dry.      Findings: Erythema and lesion present. No rash.      Comments: Patient with chronic venous stasis changes of bilateral lower extremities associated with nonhealing ulcers on the left lower extremity.   Neurological:      General: No focal deficit present.      Mental Status: He is alert and oriented to person, place, and time. Mental status is at baseline.      Cranial Nerves: No cranial nerve deficit.      Sensory: No sensory deficit.      Motor: No weakness.   Psychiatric:         Mood and Affect: Mood normal.          Behavior: Behavior normal.         Thought Content: Thought content normal.         Fluids    Intake/Output Summary (Last 24 hours) at 4/12/2021 1020  Last data filed at 4/12/2021 0900  Gross per 24 hour   Intake 100 ml   Output 1300 ml   Net -1200 ml       Laboratory  Recent Labs     04/11/21 0032 04/12/21 0224   WBC 8.2 5.7   RBC 4.29* 3.66*   HEMOGLOBIN 13.0* 11.1*   HEMATOCRIT 41.6* 35.6*   MCV 97.0 97.3   MCH 30.3 30.3   MCHC 31.3* 31.2*   RDW 60.7* 60.8*   PLATELETCT 380 347   MPV 9.0 9.5     Recent Labs     04/11/21 0032 04/12/21 0224   SODIUM 137 135   POTASSIUM 4.3 4.5   CHLORIDE 109 110   CO2 21 19*   GLUCOSE 91 85   BUN 26* 21   CREATININE 1.05 0.94   CALCIUM 8.1* 7.4*                   Imaging  DX-FOOT-COMPLETE 3+ RIGHT   Final Result      1.  No radiographic evidence for calcaneal osteomyelitis      2.  osteoarthritis of the midfoot, forefoot, tibiotalar joint      3.  Multiple hammertoe deformities         DX-FOOT-COMPLETE 3+ LEFT   Final Result      1.  Osteoarthritis of the midfoot and forefoot      2.  Flattening of the longitudinal arch      3.  Multiple hammertoe deformities      US-EXTREMITY ARTERY LOWER BILAT   Final Result      US-EXTREMITY VENOUS LOWER BILAT   Final Result           Assessment/Plan  * Cellulitis- (present on admission)  Assessment & Plan  Bilateral lower extremity cellulitis, superimposed on chronic venous stasis ulcerations  -Has failed outpatient treatment with wound care and oral ciprofloxacin  -Blood cultures and wound cultures ordered  -Start Zyvox plus meropenem per his most recent wound culture and sensitivities  -May need ID consult and surgical debridement  -LPS ordered  -N.p.o.      Venous stasis ulcer of left lower extremity (HCC)- (present on admission)  Assessment & Plan  Patient presents with multiple bilateral venous stasis and ulcerations with purulent drainage  -Most recent cultures were growing staph aureus, Enterococcus and Pseudomonas, given its  sensitivity profile will continue patient on Zyvox and meropenem  -Blood and wound cultures ordered  -We will likely need ID consult  -LPS consult placed  -May need surgical debridement, npo  -Supportive care with pain control    Essential hypertension- (present on admission)  Assessment & Plan  Patient has chronic hypertension, uncontrolled  SBP 140s to 170s  -Continue home blood pressure medication amlodipine, may benefit from a alternate medication choice given amlodipine can worsen lower extremity edema which in fact may make his leg wounds worse  -Labetalol as needed with parameters    History of prediabetes- (present on admission)  Assessment & Plan  Patient is prediabetic with a hemoglobin A1c of 5.7%  -Diet education  -No indication for insulin therapy at this time    History of hepatitis C- (present on admission)  Assessment & Plan  History of, recently nondetected    Macrocytic anemia- (present on admission)  Assessment & Plan  Microcytic anemia, consistent with baseline  -Continue to monitor         VTE prophylaxis: heparin

## 2021-04-12 NOTE — CONSULTS
INFECTIOUS DISEASES INPATIENT CONSULT NOTE     Date of Service: 4/12/2021    Consult Requested By: Fabián Aponte M.D.    Reason for Consultation: Chronic lower extremity wounds     History of Present Illness:   Goran Chavez is a 65 y.o.male with a previous history of hypertension, polysubstance use, nondiabetic left foot Charcot joint, chronic hep C, PAD, chronic left lower extremity wounds secondary to venous stasis, wounds + MRSA,  GAS, Pseudomonas admitted 4/10/2021 for progressively worsening lower extremity pain, greenish drainage and edema.   Patient reports that he finished 14-day ciprofloxacin course  2 weeks ago prescribed by his wound care provider (follows up with [facility]).  Did not notice any improvement while on ciprofloxacin.  Reports of having above mentioned symptoms worsening over the course of last 3 weeks.  He denies any fever, chills, cough, abdominal pain, diarrhea, constipation but admits to having intermittent nausea.     Per chart review most recent wound cultures 11/2020 positive for MSSA, Pseudomonas (resistant to cefepime, Zosyn), Enterococcus faecalis.     On presentation to the ER he was found to have an elevated inflammatory markers with CRP 13.2, , CBC with Hb 11.1, WBC 5.7, UDS positive for methamphetamines, cannabis.  Left leg wound cultures positive for moderate gram-positive cocci.  X-ray bilateral feet negative for osteomyelitis, Doppler ultrasound lower extremity negative for DVT.  Arterial ultrasound bilateral lower extremity consistent with 50 to 75% stenosis of left popliteal and proximal anterior tibial artery.  Patient started on IV meropenem and oral linezolid overnight for left lower extremity nonhealing wounds.  In the interim patient has been seen by vascular surgery Dr. Griffin for peripheral artery disease, recommending CTA and possible revascularization for limb salvage.     All other review of systems reviewed and negative except those  documented above in the HPI.     PMH:   Past Medical History:   Diagnosis Date   • Anxiety    • Charcot's joint of left foot, non-diabetic 3/21/2016   • Hepatitis C, chronic (HCC)    • Hypertension    • Kidney disease    • Migraine    • Polysubstance abuse (HCC) 3/8/2018   • Tobacco use 2016   • Ulcer of left lower extremity with necrosis of muscle (HCC) 3/21/2016   • Venous stasis ulcer (HCC) 2017    bilateral lower extremity       PSH:  Past Surgical History:   Procedure Laterality Date   • IRRIGATION & DEBRIDEMENT ORTHO Left 2020    Procedure: IRRIGATION AND DEBRIDEMENT, WOUND - LEG;  Surgeon: Barrie Israel M.D.;  Location: SURGERY [facility];  Service: Orthopedics   • IRRIGATION & DEBRIDEMENT ORTHO Left 2020    Procedure: IRRIGATION AND DEBRIDEMENT, WOUND - LEG;  Surgeon: Pasquale John M.D.;  Location: SURGERY [facility];  Service: Orthopedics   • IRRIGATION & DEBRIDEMENT ORTHO Left 2020    Procedure: IRRIGATION AND DEBRIDEMENT, WOUND-FOOT;  Surgeon: Barrie Israel M.D.;  Location: SURGERY [facility];  Service: Orthopedics   • IRRIGATION & DEBRIDEMENT ORTHO Left 2020    Procedure: IRRIGATION AND DEBRIDEMENT, WOUND - LEG;  Surgeon: Barrie Israel M.D.;  Location: SURGERY [facility];  Service: Orthopedics   • TIBIA ORIF Right    • SHOULDER ORIF Left    • HAND SURGERY Left     due to infection   • MS DRAIN SKIN ABSCESS SIMPLE Left     leg, near the knee, remote       FAMILY HX:  Family History   Problem Relation Age of Onset   • Lung Disease Mother 70         from COPD   • Hypertension Mother    • Other Father 82         from brain aneurysm after fall   • Cancer Neg Hx    • Heart Disease Neg Hx    • Stroke Neg Hx    • Diabetes Neg Hx        SOCIAL HX:  Social History     Socioeconomic History   • Marital status: Legally      Spouse name: Not on file   • Number of children: Not on file   • Years of education: Not on file   • Highest  education level: Not on file   Occupational History   • Not on file   Tobacco Use   • Smoking status: Former Smoker     Packs/day: 0.00     Years: 48.00     Pack years: 0.00     Types: Cigarettes     Quit date: 2018     Years since quittin.3   • Smokeless tobacco: Never Used   • Tobacco comment: states he is using 7mg nicotine patch   Substance and Sexual Activity   • Alcohol use: No     Alcohol/week: 7.2 oz     Types: 12 Cans of beer per week     Comment: history of alcoholism    • Drug use: Yes     Types: Marijuana, Inhaled, Methamphetamines     Comment: MJ every day, intermittent meth use   • Sexual activity: Not on file   Other Topics Concern   • Not on file   Social History Narrative   • Not on file     Social Determinants of Health     Financial Resource Strain:    • Difficulty of Paying Living Expenses:    Food Insecurity:    • Worried About Running Out of Food in the Last Year:    • Ran Out of Food in the Last Year:    Transportation Needs:    • Lack of Transportation (Medical):    • Lack of Transportation (Non-Medical):    Physical Activity:    • Days of Exercise per Week:    • Minutes of Exercise per Session:    Stress:    • Feeling of Stress :    Social Connections:    • Frequency of Communication with Friends and Family:    • Frequency of Social Gatherings with Friends and Family:    • Attends [employer] Services:    • Active Member of Clubs or Organizations:    • Attends Club or Organization Meetings:    • Marital Status:    Intimate Partner Violence:    • Fear of Current or Ex-Partner:    • Emotionally Abused:    • Physically Abused:    • Sexually Abused:      Social History     Tobacco Use   Smoking Status Former Smoker   • Packs/day: 0.00   • Years: 48.00   • Pack years: 0.00   • Types: Cigarettes   • Quit date: 2018   • Years since quittin.3   Smokeless Tobacco Never Used   Tobacco Comment    states he is using 7mg nicotine patch     Social History     Substance and Sexual Activity    Alcohol Use No   • Alcohol/week: 7.2 oz   • Types: 12 Cans of beer per week    Comment: history of alcoholism        Allergies/Intolerances:  No Known Allergies    History reviewed with the patient .    Other Current Medications:    Current Facility-Administered Medications:   •  senna-docusate (PERICOLACE or SENOKOT S) 8.6-50 MG per tablet 2 tablet, 2 tablet, Oral, BID, 2 tablet at 04/12/21 0638 **AND** polyethylene glycol/lytes (MIRALAX) PACKET 1 Packet, 1 Packet, Oral, QDAY PRN **AND** magnesium hydroxide (MILK OF MAGNESIA) suspension 30 mL, 30 mL, Oral, QDAY PRN **AND** bisacodyl (DULCOLAX) suppository 10 mg, 10 mg, Rectal, QDAY PRN, Romina Olmos M.D.  •  heparin injection 5,000 Units, 5,000 Units, Subcutaneous, Q8HRS, Romina Olmos M.D., Stopped at 04/12/21 0600  •  acetaminophen (Tylenol) tablet 650 mg, 650 mg, Oral, Q6HRS PRN, Romina Olmos M.D., 650 mg at 04/11/21 0735  •  labetalol (NORMODYNE/TRANDATE) injection 10 mg, 10 mg, Intravenous, Q4HRS PRN, Romina Olmos M.D., 10 mg at 04/11/21 0140  •  amLODIPine (NORVASC) tablet 10 mg, 10 mg, Oral, Q DAY, Romina Olmos M.D., 10 mg at 04/12/21 0638  •  ascorbic acid (Vitamin C) tablet 500 mg, 500 mg, Oral, DAILY, Romina Olmos M.D., 500 mg at 04/12/21 0638  •  atorvastatin (LIPITOR) tablet 40 mg, 40 mg, Oral, Q EVENING, Romina Olmos M.D., 40 mg at 04/11/21 1724  •  ferrous gluconate (FERGON) tablet 324 mg, 324 mg, Oral, QDAY with Breakfast, Romina Olmos M.D., 324 mg at 04/12/21 0916  •  sertraline (Zoloft) tablet 100 mg, 100 mg, Oral, DAILY, Romina Olmos M.D., 100 mg at 04/12/21 0642  •  meropenem (MERREM) 500 mg in  mL IVPB, 500 mg, Intravenous, Q6HRS, Romina Olmos M.D., Stopped at 04/12/21 0709  •  linezolid (ZYVOX) tablet 600 mg, 600 mg, Oral, Q12HR, Romina Olmos M.D., 600 mg at 04/12/21 0015  •  Pharmacy Consult Request ...Pain Management Review 1 Each, 1 Each, Other, PHARMACY TO DOSE, Fabián Aponte,  M.D.  •  oxyCODONE immediate-release (ROXICODONE) tablet 2.5 mg, 2.5 mg, Oral, Q3HRS PRN, 2.5 mg at 21 1130 **OR** oxyCODONE immediate-release (ROXICODONE) tablet 5 mg, 5 mg, Oral, Q3HRS PRN, 5 mg at 21 1116 **OR** morphine (pf) 4 mg/mL injection 2 mg, 2 mg, Intravenous, Q3HRS PRN, Fabián Aponte M.D., 2 mg at 21 1340  •  aspirin (ASA) chewable tab 81 mg, 81 mg, Oral, DAILY, Fabián Aponte M.D., 81 mg at 21 0638      Most Recent Vital Signs:  /94   Pulse 76   Temp 37.1 °C (98.8 °F) (Temporal)   Resp 18   Ht 1.829 m (6')   Wt 80.2 kg (176 lb 12.9 oz)   SpO2 94%   BMI 23.98 kg/m²   Temp  Av.8 °C (98.3 °F)  Min: 36.7 °C (98 °F)  Max: 37.1 °C (98.8 °F)    Physical Exam:  General: Chronically ill-appearing, lying in the bed, in no acute distress.    HEENT: sclera anicteric, PERRL, extraocular muscles intact, mucous membranes dry  Neck: supple, no lymphadenopathy  Chest: CTAB, no rales, rhonchi or wheezes, normal work of breathing.  Cardiac: regular rate and rhythm, normal S1 S2, no murmurs, rubs or gallops  Abdomen: + bowel sounds, soft, non-tender, non-distended, no hepatosplenomegaly  Extremities: Chronic venous stasis changes bilateral lower extremities, dressing done bilateral legs , mild tenderness to palpation left lower extremity, joint deformity of the left foot observed.  Unable to palpate left lower extremity pulses.  Skin: Erythematous left leg.   Neuro: Alert and oriented times 3, non-focal exam, speech fluent, full range of motion to bilateral upper and lower extremities  Psych: normal mood and behavior, memory intact, normal judgement    Pertinent Lab Results:  Recent Labs     21  0032 21  0224   WBC 8.2 5.7      Recent Labs     212 21   HEMOGLOBIN 13.0* 11.1*   HEMATOCRIT 41.6* 35.6*   MCV 97.0 97.3   MCH 30.3 30.3   PLATELETCT 380 347         Recent Labs     21   SODIUM 137 135   POTASSIUM 4.3 4.5  "  CHLORIDE 109 110   CO2 21 19*   CREATININE 1.05 0.94        Recent Labs     04/11/21 0032   ALBUMIN 2.9*        Pertinent Micro:  Results     Procedure Component Value Units Date/Time    CULTURE WOUND W/ GRAM STAIN [[id number]] Collected: 04/11/21 0330    Order Status: Completed Specimen: Wound from Left Leg Updated: 04/12/21 0831     Significant Indicator NEG     Source WND     Site LEFT LEG     Culture Result Moderate growth mixed enteric sharri.     Gram Stain Result Few WBCs.  Moderate Gram positive cocci.  Correct result: Few WBCs.  Moderate Gram positive cocci.  Rare Gram negative rods.  Erroneous result:  Few WBCs.  Moderate Gram positive cocci.      Narrative:      Collected By:ALBERTA PAREDES  Collected By:ALBERTA PAREDES    GRAM STAIN [[id number]] Collected: 04/11/21 0330    Order Status: Completed Specimen: Wound Updated: 04/12/21 0831     Significant Indicator .     Source WND     Site LEFT LEG     Gram Stain Result Few WBCs.  Moderate Gram positive cocci.  Correct result: Few WBCs.  Moderate Gram positive cocci.  Rare Gram negative rods.  Erroneous result:  Few WBCs.  Moderate Gram positive cocci.      Narrative:      Collected By:ALBERTA PAREDES  Collected By:ALBERTA PAREDES    BLOOD CULTURE [[id number]] Collected: 04/11/21 0035    Order Status: Completed Specimen: Blood from Peripheral Updated: 04/12/21 0633     Significant Indicator NEG     Source BLD     Site PERIPHERAL     Culture Result No Growth  Note: Blood cultures are incubated for 5 days and  are monitored continuously.Positive blood cultures  are called to the RN and reported as soon as  they are identified.      Narrative:      1 of 2 for Blood Culture x 2 sites order. Per Hospital  Policy: Only change Specimen Src: to \"Line\" if specified by  physician order.  No site indicated    BLOOD CULTURE [[id number]] Collected: 04/11/21 0046    Order Status: Completed Specimen: Blood from Peripheral Updated: 04/12/21 0633     Significant " "Indicator NEG     Source BLD     Site PERIPHERAL     Culture Result No Growth  Note: Blood cultures are incubated for 5 days and  are monitored continuously.Positive blood cultures  are called to the RN and reported as soon as  they are identified.      Narrative:      2 of 2 blood culture x2  Sites order. Per Hospital Policy:  Only change Specimen Src: to \"Line\" if specified by physician  order.  Right Forearm/Arm    SARS-CoV-2 PCR (24 hour In-House): Collect NP swab in [facility] [[id number]] Collected: 04/11/21 0106    Order Status: Completed Specimen: Respirate from Nasopharyngeal Updated: 04/11/21 1811     SARS-CoV-2 Source NP Swab     SARS-CoV-2 by PCR NotDetected     Comment: PATIENTS: Important information regarding your results and instructions can  be found at [email or web address]   \"After your  Covid-19 Test\"  [facility] providers: PLEASE REFER TO DE-ESCALATION AND RETESTING PROTOCOL  on [email or web address]  **The Roche SARS-CoV-2 RT-PCR test has been made available for use under the  Emergency Use Authorization (EUA) only.         Narrative:      Have you been in close contact with a person who is suspected  or known to be positive for COVID-19 within the last 30 days  (e.g. last seen that person < 30 days ago)->No        Blood Culture   Date Value Ref Range Status   12/19/2018 No growth after 5 days of incubation.  Final        Studies:  DX-FOOT-COMPLETE 3+ LEFT    Result Date: 4/11/2021 4/11/2021 3:55 PM HISTORY/REASON FOR EXAM:  Atraumatic Pain/Swelling/Deformity; assess left charcot foot deformity TECHNIQUE/EXAM DESCRIPTION AND NUMBER OF VIEWS: 3 views of the LEFT foot. COMPARISON:  None. FINDINGS:  There is no fracture. Alignment is normal. Midfoot and forefoot degenerative changes are present. There is flattening of the arch. There are multiple hammertoe deformities.     1.  Osteoarthritis of the midfoot and forefoot 2.  Flattening of the longitudinal arch 3.  Multiple hammertoe " deformities    DX-FOOT-COMPLETE 3+ RIGHT    Result Date: 2021 3:56 PM HISTORY/REASON FOR EXAM:  Atraumatic Pain/Swelling/Deformity; discolored boggy right plantar heel wound. please assess for OM, abscess, underlying ulceration. TECHNIQUE/EXAM DESCRIPTION AND NUMBER OF VIEWS: 3 views of the RIGHT foot. COMPARISON:  None of the right foot FINDINGS: There is no fracture. There are multiple hammertoe deformities. Midfoot, forefoot, and tibiotalar joint degenerative changes are present. There is orthopedic hardware within the distal tibia.     1.  No radiographic evidence for calcaneal osteomyelitis 2.  osteoarthritis of the midfoot, forefoot, tibiotalar joint 3.  Multiple hammertoe deformities     US-EXTREMITY ARTERY LOWER BILAT    Result Date: 2021  Lower Extremity  Arterial Duplex Report  Vascular Laboratory  CONCLUSIONS  Left superficial femoral, profunda femoral, popliteal and tibial artery  stenoses 50-75%.  Right no stenosis.  MARCELLE MICHELLE  Exam Date:     2021 12:00  Room #:     Inpatient  Priority:     Stat  Ht (in):     72      Wt (lb):     176  Ordering Physician:        ALDO DE LA GARZA  Referring Physician:  Sonographer:               Iesha Marcelino RDCS, RVT  Study Type:                Complete Bilateral  Technical Quality:         Adequate  Age:    65    Gender:     M  MRN:    [id number]  :    1955      BSA:    2.02  Indications:     Ulceration of LE  CPT Codes:       50399  ICD Codes:       707.1  History:         Bilateral lower extremity non-healing wound.  Limitations:                RIGHT  Waveform        Peak Systolic Velocity (cm/s)                  Prox    Prox-Mid  Mid    Mid-Dist  Distal  Triphasic                         119                      CFA  Triphasic       136                                        PFA  Triphasic       147               134              150     SFA  Triphasic                         151                       POP  Hyperemic       104                                100     AT  Hyperemic       81                                 88      PT  Hyperemic       98                                 72      BRIGIDO                LEFT  Waveform        Peak Systolic Velocity (cm/s)                  Prox    Prox-Mid  Mid    Mid-Dist  Distal  Monophasic                        157                      CFA  Monophasic      277                                        PFA                  271               167              166     SFA  Monophasic                        212                      POP  Monophasic      237                                72      AT  Monophasic      171                                112     PT  Monophasic      89                                 125     BRIGIDO  FINDINGS  Right.  Plaque is seen in the common femoral artery  with no elevated velocities to  indicate hemodynamic significance.  Waveforms at the ankle are brisk and hyperemic.  Left.  Stenosis of the left proximal  femoral andprofunda femoral arteries .  Velocities are consistent with 50-75% stenosis as well as left popliteal  artery and proximal anterior tibial artery..  Waveforms at the ankle are monophasic with adequate  amplitude.  Narciso Hernández  (Electronically Signed)  Final Date:      2021                   16:10    US-EXTREMITY VENOUS LOWER BILAT    Result Date: 2021   Vascular Laboratory  CONCLUSIONS  Negative for DVT or SVT.  MARCELLE MICHELLE  Exam Date:     2021 11:37  Room #:     Inpatient  Priority:     Stat  Ht (in):     72      Wt (lb):     176  Ordering Physician:        ALDO DE LA GARZA  Referring Physician:       [id number]  Sonographer:               Iesha Marcelino RDCS, RVT  Study Type:                Complete Bilateral  Technical Quality:         Adequate  Age:    65    Gender:     M  MRN:    [id number]  :    1955      BSA:    2.02  Indications:     Ulceration of LE   CPT Codes:       92133  ICD Codes:       707.1  History:         Ulcerations, swelling  Limitations:     TDS due bandadge bilaterally.  PROCEDURES:  Bilateral lower extremity venous duplex imaging.  The following venous structures were evaluated: common femoral, profunda  femoral,  femoral, popliteal , peroneal and posterior tibial veins.  Serial compression, augmentation maneuvers,  color and spectral Doppler  flow evaluations were performed.  FINDINGS:  Bilateral lower extremities -.  No evidence of   deep venous thrombosis bilaterally.  Narciso UMESH Hernández  (Electronically Signed)  Final Date:      11 April 2021                   15:31      IMPRESSION:   1.  Chronic nonhealing left leg ulcers    2.  Cellulitis left lower extremity  3.  Peripheral artery disease      PLAN:   Goran Chavez is a 65 y.o. male with a previous history of tobacco use (quit 2 years ago), polysubstance abuse, chronic nonhealing left lower extremity ulcer (prior + MRSA, GAS, Pseudomonas) in the setting of arterial insufficiency admitted for worsening purulence/drainage, pain and swelling of his legs.  Infectious disease consulted for antibiotic recommendations.    -Continue oral linezolid as appropriate given his current wound culture results.  Current wound cultures not suggestive of Pseudomonas infection correlating with patient's wounds appearance which do not look grossly infected. Will discontinue Meropenem.  Anticipate short antibiotic course.  -Blood cultures negative , will continue to follow.   -Vascular surgery planning possible revascularization after CTA for likely iliac artery stenosis.         Plan of care discussed with KOSTAS Aponte M.D.. Will continue to follow    Ronald Manning M.D.      Please note that this dictation was created using voice recognition software. I have worked with technical experts from [facility] to optimize the interface.  I have made every reasonable attempt to correct obvious errors, but  there may be errors of grammar and possibly content that I did not discover before finalizing the note.

## 2021-04-12 NOTE — CARE PLAN
Problem: Pain Management  Goal: Pain level will decrease to patient's comfort goal  Outcome: PROGRESSING AS EXPECTED  Intervention: Follow pain managment plan developed in collaboration with patient and Interdisciplinary Team  Note: Discussed patients pain and what causes pain to worsen. Discussed interventions available, PO oxy 2.5 for pain 4-6, PO oxy 5mg for pain level 7-10, discussed having morphine available for breakthrough pain if it was unresolved after 1 hour of oral intervention. Patient verbalizes understanding of pain regimen.      Problem: Infection  Goal: Will remain free from infection  Intervention: Assess signs and symptoms of infection  Note: Patient is currently on IV antibiotics. Monitoring patient for fevers and WBC. Infectious disease on board and assisting with Abx. Patient educated on antibiotics that he is receiving as well as the frequency of them.

## 2021-04-12 NOTE — PROGRESS NOTES
Vascular    Monophasic arterial flow throughout the LLE could indicate iliac stenosis  Nonhealing wounds  Patient will likely require revascularization for limb salvage  Will order CTA for surgical planning  Also ordered BHUPINDER/TBI to compare preop and postop measurements    Further recs pending CTA results    Poli Rhodes MD  [facility] (General and Vascular Surgery)  Cell: [phone] (text/call)  Office: [phone]  __________________________________________________________________  Patient:LosUMESH Chavez   MRN:[id number]   CSN:[id number]    4/12/2021    10:33 AM

## 2021-04-13 LAB
ANION GAP SERPL CALC-SCNC: 7 MMOL/L (ref 7–16)
BUN SERPL-MCNC: 18 MG/DL (ref 8–22)
CALCIUM SERPL-MCNC: 7.7 MG/DL (ref 8.5–10.5)
CHLORIDE SERPL-SCNC: 112 MMOL/L (ref 96–112)
CO2 SERPL-SCNC: 21 MMOL/L (ref 20–33)
CREAT SERPL-MCNC: 1 MG/DL (ref 0.5–1.4)
ERYTHROCYTE [DISTWIDTH] IN BLOOD BY AUTOMATED COUNT: 60.3 FL (ref 35.9–50)
GLUCOSE SERPL-MCNC: 89 MG/DL (ref 65–99)
HCT VFR BLD AUTO: 38.1 % (ref 42–52)
HGB BLD-MCNC: 11.9 G/DL (ref 14–18)
MAGNESIUM SERPL-MCNC: 2 MG/DL (ref 1.5–2.5)
MCH RBC QN AUTO: 30.3 PG (ref 27–33)
MCHC RBC AUTO-ENTMCNC: 31.2 G/DL (ref 33.7–35.3)
MCV RBC AUTO: 96.9 FL (ref 81.4–97.8)
PHOSPHATE SERPL-MCNC: 2.5 MG/DL (ref 2.5–4.5)
PLATELET # BLD AUTO: 365 K/UL (ref 164–446)
PMV BLD AUTO: 9.4 FL (ref 9–12.9)
POTASSIUM SERPL-SCNC: 4.9 MMOL/L (ref 3.6–5.5)
RBC # BLD AUTO: 3.93 M/UL (ref 4.7–6.1)
SODIUM SERPL-SCNC: 140 MMOL/L (ref 135–145)
WBC # BLD AUTO: 5.4 K/UL (ref 4.8–10.8)

## 2021-04-13 PROCEDURE — 36415 COLL VENOUS BLD VENIPUNCTURE: CPT

## 2021-04-13 PROCEDURE — A9270 NON-COVERED ITEM OR SERVICE: HCPCS | Performed by: STUDENT IN AN ORGANIZED HEALTH CARE EDUCATION/TRAINING PROGRAM

## 2021-04-13 PROCEDURE — 700102 HCHG RX REV CODE 250 W/ 637 OVERRIDE(OP): Performed by: INTERNAL MEDICINE

## 2021-04-13 PROCEDURE — 700102 HCHG RX REV CODE 250 W/ 637 OVERRIDE(OP): Performed by: STUDENT IN AN ORGANIZED HEALTH CARE EDUCATION/TRAINING PROGRAM

## 2021-04-13 PROCEDURE — A9270 NON-COVERED ITEM OR SERVICE: HCPCS | Performed by: INTERNAL MEDICINE

## 2021-04-13 PROCEDURE — 700111 HCHG RX REV CODE 636 W/ 250 OVERRIDE (IP): Performed by: INTERNAL MEDICINE

## 2021-04-13 PROCEDURE — 700111 HCHG RX REV CODE 636 W/ 250 OVERRIDE (IP): Performed by: STUDENT IN AN ORGANIZED HEALTH CARE EDUCATION/TRAINING PROGRAM

## 2021-04-13 PROCEDURE — 85027 COMPLETE CBC AUTOMATED: CPT

## 2021-04-13 PROCEDURE — 84100 ASSAY OF PHOSPHORUS: CPT

## 2021-04-13 PROCEDURE — 83735 ASSAY OF MAGNESIUM: CPT

## 2021-04-13 PROCEDURE — 80048 BASIC METABOLIC PNL TOTAL CA: CPT

## 2021-04-13 PROCEDURE — 770006 HCHG ROOM/CARE - MED/SURG/GYN SEMI*

## 2021-04-13 PROCEDURE — 99232 SBSQ HOSP IP/OBS MODERATE 35: CPT | Performed by: STUDENT IN AN ORGANIZED HEALTH CARE EDUCATION/TRAINING PROGRAM

## 2021-04-13 PROCEDURE — 99232 SBSQ HOSP IP/OBS MODERATE 35: CPT | Mod: GC | Performed by: INTERNAL MEDICINE

## 2021-04-13 RX ORDER — LISINOPRIL 10 MG/1
10 TABLET ORAL
Status: DISCONTINUED | OUTPATIENT
Start: 2021-04-13 | End: 2021-04-14 | Stop reason: HOSPADM

## 2021-04-13 RX ADMIN — ASPIRIN 81 MG: 81 TABLET, CHEWABLE ORAL at 04:18

## 2021-04-13 RX ADMIN — OXYCODONE 5 MG: 5 TABLET ORAL at 04:21

## 2021-04-13 RX ADMIN — OXYCODONE 5 MG: 5 TABLET ORAL at 21:01

## 2021-04-13 RX ADMIN — FERROUS GLUCONATE 324 MG: 324 TABLET ORAL at 08:43

## 2021-04-13 RX ADMIN — OXYCODONE HYDROCHLORIDE AND ACETAMINOPHEN 500 MG: 500 TABLET ORAL at 04:18

## 2021-04-13 RX ADMIN — AMLODIPINE BESYLATE 10 MG: 10 TABLET ORAL at 04:18

## 2021-04-13 RX ADMIN — OXYCODONE 5 MG: 5 TABLET ORAL at 18:00

## 2021-04-13 RX ADMIN — HEPARIN SODIUM 5000 UNITS: 5000 INJECTION, SOLUTION INTRAVENOUS; SUBCUTANEOUS at 14:09

## 2021-04-13 RX ADMIN — LISINOPRIL 10 MG: 10 TABLET ORAL at 18:00

## 2021-04-13 RX ADMIN — SERTRALINE HYDROCHLORIDE 100 MG: 100 TABLET, FILM COATED ORAL at 04:18

## 2021-04-13 RX ADMIN — OXYCODONE 5 MG: 5 TABLET ORAL at 14:09

## 2021-04-13 RX ADMIN — CEFAZOLIN SODIUM 2 G: 2 INJECTION, SOLUTION INTRAVENOUS at 05:44

## 2021-04-13 RX ADMIN — ATORVASTATIN CALCIUM 40 MG: 40 TABLET, FILM COATED ORAL at 18:00

## 2021-04-13 RX ADMIN — OXYCODONE 5 MG: 5 TABLET ORAL at 08:43

## 2021-04-13 ASSESSMENT — ENCOUNTER SYMPTOMS
SORE THROAT: 0
BACK PAIN: 0
ABDOMINAL PAIN: 0
HEADACHES: 0
VOMITING: 0
DEPRESSION: 0
MYALGIAS: 1
SHORTNESS OF BREATH: 0
CHILLS: 0
DIZZINESS: 0
NAUSEA: 0
CLAUDICATION: 1
FEVER: 0
BRUISES/BLEEDS EASILY: 0
WEAKNESS: 0

## 2021-04-13 ASSESSMENT — PAIN DESCRIPTION - PAIN TYPE
TYPE: ACUTE PAIN

## 2021-04-13 NOTE — PROGRESS NOTES
Hospital Medicine Daily Progress Note    Date of Service  4/13/2021    Chief Complaint  65 y.o. male admitted 4/10/2021 with left leg pain    Hospital Course  Per HPI and Dr. Aponte's H&P:65 y.o. male with a past medical history of Charcot's joint of the left foot, chronic lower extremity wounds secondary to venous stasis and hypertension presented 4/10/2021 with increasing bilateral lower extremity pain and worsening bilateral wounds with increasing drainage.  Patient follows with outpatient wound care, per the chart it was recommended that he goes to the ER on 4/7/2021 for IV antibiotics and possible surgical intervention.  He had been treated with oral Cipro and completed this regimen on 3/31/2021, he states he had no improvement while on this.  He denies fever or chills but overall feels more fatigued.  He denies chest pain or shortness of breath, he denies nausea vomiting or diarrhea. On physical examination the patient does have skin changes consistent with chronic venous insufficiency and large ulcers on his left lower extremity associated with purulent discharge concerning for peripheral arterial disease.      In the ER, temp is 98, HR 81, RR 14, /92, saturating well on room air. Labs are remarkable for normal WBC, H&H 13/41.6 electrolytes and renal function are relatively unremarkable.  Lactic acid of 1.7, elevated CRP of 13.2.      Previous wound cultures did grow staph aureus and Pseudomonas as well as Enterococcus faecalis. He has been started on broad-spectrum antibiotics based on previous cultures.    4/11/21 - 4/12/2021: Left lower extremity arterial Dopplers are consistent with peripheral arterial disease.  He has been started on aspirin and statin therapy.  Vascular surgery requested CTA of LE and BHUPINDRE/PVR. He continued to show interval improvement on current antibiotic therapy. ID team has been following for Abx recommendations.     Interval Problem Update  4/13 under my care:  Vitals reviewed;  afebrile.  The rest of the vitals within normal parameters except elevated BP with SBP in 160s.  Pain scale reported - 2-7 in left leg    At bedside, reports of still having pain in his left leg. During rounds, I explained to him about pending imagings reviewed by vascular team and possible need for surgery.     In PM, updated him about current plan from vascular surgery (no surgical intervention) and ID team (stopping Abx), he was a bit confused about MRSA and no further treatment.     ID and vascular surgery follow up appreciated     Labs reviewed   BCx no growth  Wound Cx - Staph aureus (MRSA by screening) and Pseudomonas  No leukocytosis  Cr 1    Imaging done reviewed     Consultants/Specialty  ID  Vascular surgery  Limb preservation services    Code Status  Full Code    Disposition  Can be medically clear to DC as early as 4/14  PT eval requested     Discussed with patient, patient's nurse and with multidisciplinary team during rounds including , pharmacist and charge nurse.        Review of Systems  Review of Systems   Constitutional: Negative for chills, fever and malaise/fatigue.   HENT: Negative for sore throat.    Respiratory: Negative for shortness of breath.    Cardiovascular: Positive for claudication and leg swelling. Negative for chest pain.   Gastrointestinal: Negative for abdominal pain, nausea and vomiting.   Genitourinary: Negative for dysuria and urgency.   Musculoskeletal: Positive for myalgias. Negative for back pain and joint pain.   Skin: Negative for rash.        Wound and lesion in left leg   Neurological: Negative for dizziness, weakness and headaches.   Endo/Heme/Allergies: Does not bruise/bleed easily.   Psychiatric/Behavioral: Negative for depression.        Physical Exam  Temp:  [36.3 °C (97.4 °F)-36.8 °C (98.3 °F)] 36.8 °C (98.3 °F)  Pulse:  [63-78] 64  Resp:  [17-19] 18  BP: (115-165)/(50-96) 146/82  SpO2:  [91 %-98 %] 92 %    Physical Exam  Vitals reviewed.    Constitutional:       Appearance: Normal appearance. He is obese.   HENT:      Head: Normocephalic.      Nose: No congestion or rhinorrhea.      Mouth/Throat:      Mouth: Mucous membranes are moist.      Pharynx: Oropharynx is clear.   Eyes:      General: No scleral icterus.     Conjunctiva/sclera: Conjunctivae normal.   Cardiovascular:      Rate and Rhythm: Normal rate and regular rhythm.      Heart sounds: No murmur.   Pulmonary:      Effort: Pulmonary effort is normal. No respiratory distress.      Breath sounds: Normal breath sounds. No wheezing.   Abdominal:      General: Abdomen is flat. Bowel sounds are normal. There is no distension.      Palpations: Abdomen is soft.      Tenderness: There is no abdominal tenderness. There is no guarding.   Musculoskeletal:         General: No swelling, tenderness or deformity.      Cervical back: Neck supple. No muscular tenderness.   Skin:     General: Skin is warm and dry.      Findings: Erythema and lesion present. No rash.      Comments: Patient with chronic venous stasis changes of bilateral lower extremities associated with nonhealing ulcers on the left lower extremity.    Wound images reviewed in Epic   Neurological:      General: No focal deficit present.      Mental Status: He is alert and oriented to person, place, and time. Mental status is at baseline.      Sensory: Sensory deficit (in b/l LEs) present.   Psychiatric:         Mood and Affect: Mood normal.         Behavior: Behavior normal.         Fluids    Intake/Output Summary (Last 24 hours) at 4/13/2021 1606  Last data filed at 4/13/2021 1400  Gross per 24 hour   Intake 560 ml   Output 1400 ml   Net -840 ml       Laboratory  Recent Labs     04/11/21  0032 04/12/21  0224 04/13/21  0400   WBC 8.2 5.7 5.4   RBC 4.29* 3.66* 3.93*   HEMOGLOBIN 13.0* 11.1* 11.9*   HEMATOCRIT 41.6* 35.6* 38.1*   MCV 97.0 97.3 96.9   MCH 30.3 30.3 30.3   MCHC 31.3* 31.2* 31.2*   RDW 60.7* 60.8* 60.3*   PLATELETCT 380 347 365   MPV  9.0 9.5 9.4     Recent Labs     04/11/21  0032 04/12/21  0224 04/13/21  0400   SODIUM 137 135 140   POTASSIUM 4.3 4.5 4.9   CHLORIDE 109 110 112   CO2 21 19* 21   GLUCOSE 91 85 89   BUN 26* 21 18   CREATININE 1.05 0.94 1.00   CALCIUM 8.1* 7.4* 7.7*                   Imaging  CT-CTA AORTA-RO WITH & W/O-POST PROCESS   Final Result      1.  Prominent bilateral common femoral artery calcified plaque with greater than 50% diameter stenosis.   2.  Otherwise patent bilateral lower extremities with patent three-vessel runoff.   3.  Bilateral leg edema and/or cellulitis.   4.  Trace bilateral pleural effusions.   5.  Mild ascites.   6.  Bilateral renal cortical scarring.   7.  Enlarged iliac and inguinal lymph nodes which are nonspecific. This could be reactive however cannot exclude malignancy. Suggest follow-up.      US-BHUPINDER SINGLE LEVEL BILAT   Final Result      DX-FOOT-COMPLETE 3+ RIGHT   Final Result      1.  No radiographic evidence for calcaneal osteomyelitis      2.  osteoarthritis of the midfoot, forefoot, tibiotalar joint      3.  Multiple hammertoe deformities         DX-FOOT-COMPLETE 3+ LEFT   Final Result      1.  Osteoarthritis of the midfoot and forefoot      2.  Flattening of the longitudinal arch      3.  Multiple hammertoe deformities      US-EXTREMITY ARTERY LOWER BILAT   Final Result      US-EXTREMITY VENOUS LOWER BILAT   Final Result           Assessment/Plan  * Cellulitis- (present on admission)  Assessment & Plan  Provided IV Abx therapy on admission to 4/13  Given current clinical clinical picture, agrees with ID team recommendations  No signs of acute infection  DC Abx  MRSA and Pseudomonas are likely colonizers  Continue wound care      Venous stasis ulcer of left lower extremity (HCC)- (present on admission)  Assessment & Plan  Patient presents with multiple bilateral venous stasis and ulcerations with purulent drainage  -Most recent cultures were growing staph aureus, Enterococcus and Pseudomonas,  "given its sensitivity profile will continue patient on Zyvox and meropenem  -Blood cx on admission - no growth to date and wound cultures - S aureus (MRSA) and pseudomonas  -Supportive care with pain control  -LPS and ID consult appreciated   Per ID  \"No signs of acute infection  Will dc abx today  MRSA and Pseudomonas are likely colonizers  Continue wound care\"    Essential hypertension- (present on admission)  Assessment & Plan  Patient has chronic hypertension, uncontrolled  SBP 140s to 170s  -Continue home blood pressure medication amlodipine - would have like to try alternate regimen: lisinopril started  -Would consider DC amlodipine if can achieve goal <140/90  -Labetalol as needed with parameters    Peripheral artery disease (HCC)  Assessment & Plan  Arterial Dopplers of the left lower extremity suggest peripheral arterial disease  Continue aspirin and statin  Vascular surgery recommendations appreciated - ABIs and TBIs normal; CTA is unimpressive (some femoral plaque).  -No need for revascularization at this point.     History of prediabetes- (present on admission)  Assessment & Plan  Patient is prediabetic with a hemoglobin A1c of 5.7%  -Diet education  -No indication for insulin therapy at this time    History of hepatitis C- (present on admission)  Assessment & Plan  History of, recently nondetected    Macrocytic anemia- (present on admission)  Assessment & Plan  Microcytic anemia, consistent with baseline  -Continue to monitor       VTE prophylaxis: heparin     "

## 2021-04-13 NOTE — PROGRESS NOTES
Alert and oriented x4. Pt ambulated and assisted to the bathroom. Offered fluids and snacks. Safety precautions in placed. Bed in lowest position. Upper side rails up. Treaded socks on. Reinforced the use of call light when needing assistance.

## 2021-04-13 NOTE — PROGRESS NOTES
"ADULT  INFECTIOUS DISEASES  CONSULT  FOLLOW UP NOTE     Reason for Consultation: Lower extremity nonhealing ulceration    History of Present Illness:   \"Goran Chavez is a 65 y.o.male with a previous history of hypertension, polysubstance use, nondiabetic left foot Charcot joint, chronic hep C, PAD, chronic left lower extremity wounds secondary to venous stasis, wounds + MRSA,  GAS, Pseudomonas admitted 4/10/2021 for progressively worsening lower extremity pain, greenish drainage and edema.   Patient reports that he finished 14-day ciprofloxacin course  2 weeks ago prescribed by his wound care provider (follows up with [facility] wound care).  Did not notice any improvement while on ciprofloxacin.  Reports of having above mentioned symptoms worsening over the course of last 3 weeks.  He denies any fever, chills, cough, abdominal pain, diarrhea, constipation but admits to having intermittent nausea.      Per chart review most recent wound cultures 11/2020 positive for MSSA, Pseudomonas (resistant to cefepime, Zosyn), Enterococcus faecalis.      On presentation to the ER he was found to have an elevated inflammatory markers with CRP 13.2, , CBC with Hb 11.1, WBC 5.7, UDS positive for methamphetamines, cannabis.  Left leg wound cultures positive for moderate gram-positive cocci.  X-ray bilateral feet negative for osteomyelitis, Doppler ultrasound lower extremity negative for DVT.  Arterial ultrasound bilateral lower extremity consistent with 50 to 75% stenosis of left popliteal and proximal anterior tibial artery.  Patient started on IV meropenem and oral linezolid overnight for left lower extremity nonhealing wounds.  In the interim patient has been seen by vascular surgery Dr. Griffin for peripheral artery disease, recommending CTA and possible revascularization for limb salvage\"    Interim History:   4/13/2021:  Wound cultures positive for heavy growth of staph aureus, MRSA by screening method.  And " moderate growth of Pseudomonas.    4/12/2021:   -Antibiotic switch from IV meropenem, p.o. Zyvox to IV Ancef  -CTA was obtained which showed prominent bilateral common femoral artery calcified plaque with greater than 50% diameter stenosis.  Otherwise patent bilateral lower extremities with patent three-vessel runoff.  Bilateral leg edema and/or cellulitis.  -Vascular surgery, LPS following.  -Wound care team following.  2 layer wrap bilaterally applied.    Subjective:  No acute overnight events.  States he feels better with improved pain bilateral lower extremities.  Wound care team applied 2 layer wrap bilaterally.  Afebrile, stable vitals.      Current Antimicrobials: IV Ancef-day 2    Previous Antimicrobials: IV meropenem, oral Zyvox    Allergies/Intolerances:  No Known Allergies    Most Recent Vital Signs:  BP (!) 165/88   Pulse 74   Temp 36.6 °C (97.9 °F) (Temporal)   Resp 19   Ht 1.829 m (6')   Wt 80.2 kg (176 lb 12.9 oz)   SpO2 94%   BMI 23.98 kg/m²   Temp  Min: 36.3 °C (97.4 °F)  Max: 36.8 °C (98.3 °F)    Physical Exam:  General: in no acute distress  HEENT: sclera anicteric, MMM, no oral lesions  Neck: no LAD  Chest: CTAB with no r/r/w, normal work of breathing  Cardiac: RRR, normal S1 S2, no m/r/g   Abdomen: +bs, soft, NTND  Extremities: + edema, bilateral lower extremities wrapped with dressing.  No drainage, discharge, bleeding noted.   Neuro: alert    Pertinent Lab Results:  Recent Labs     04/11/21 0032 04/12/21 0224 04/13/21  0400   WBC 8.2 5.7 5.4      Recent Labs     04/11/21 0032 04/12/21 0224 04/13/21  0400   HEMOGLOBIN 13.0* 11.1* 11.9*   HEMATOCRIT 41.6* 35.6* 38.1*   MCV 97.0 97.3 96.9   MCH 30.3 30.3 30.3   PLATELETCT 380 347 365         Recent Labs     04/11/21 0032 04/12/21 0224 04/13/21  0400   SODIUM 137 135 140   POTASSIUM 4.3 4.5 4.9   CHLORIDE 109 110 112   CO2 21 19* 21   CREATININE 1.05 0.94 1.00        Invalid input(s): ALT, ALKPHOS, BILITOT, TOTALBILIRUB, BILIRUBINTOT,  BILIRUBINDIR, BILIRUBININD, ALKALINEPHOS     Microbiology:  Blood Culture   Date Value Ref Range Status   12/19/2018 No growth after 5 days of incubation.  Final        Studies:      IMPRESSSION:   Goran Chavez is a 65 y.o. male with a previous history of tobacco use (quit 2 years ago), polysubstance abuse, chronic nonhealing left lower extremity ulcerations (prior + MRSA/MSSA, GAS, Pseudomonas) in the setting of arterial insufficiency admitted for worsening purulence/drainage, pain and swelling of his legs.  Infectious disease consulted for antibiotic recommendations.    1.  Chronic nonhealing left leg ulcerations              2.  Cellulitis left lower extremity  3.  Chronic venous stasis changes  4.  Peripheral artery disease  5.  Chronic hep C  6.   Left Charcot foot    PLAN:      -Continue IV Ancef (Day 2) for cellulitis. If Cx positive for MRSA then will switch to Zyvox.   -Pseudomonas in cultures likely from colonization since wound improving and did not seem grossly infected on admission. If worsens will consider pseudomonas coverage.      -PAD mgmt per Vascular team.   -Wound care.     Discussed with KOSTAS Manning M.D.   4/13/2021

## 2021-04-13 NOTE — CARE PLAN
Problem: Pain Management  Goal: Pain level will decrease to patient's comfort goal  Intervention: Follow pain managment plan developed in collaboration with patient and Interdisciplinary Team  Note: Patient established that a comfort goal for his pain is 3. At this pain level he does not feel like he needs pharmacological interventions. Assessing patient minimum qshift and more frequently as interventions are needed. Encouraging patient to float heels in order to assist with pain. Oxy 5 being given for pain 7-10 and he is aware that he has morphine on board for breakthrough pain.      Problem: Skin Integrity  Goal: Risk for impaired skin integrity will decrease  Outcome: PROGRESSING AS EXPECTED  Intervention: Assess and monitor skin integrity, appearance and/or temperature  Note: Patient with BLE wounds. Wound is actively on board and providing dressing changes. Assisting by ensuring that dressing remain CDI and that if it is overtly saturated they are re consulted. Waiting on vascular to make recommendations based on immaging to assist in guiding POC. Infectious disease also on board and assisting with abx therapy. Assessing the sandrine wound skin of the dressing to ensure no additional pressure points are affected. Heel mepilex in use on the right. Changing those as ordered or PRN. Encouraging patient to float heels.        Problem: Infection  Goal: Will remain free from infection  Outcome: PROGRESSING AS EXPECTED  Intervention: Implement standard precautions and perform hand washing before and after patient contact  Note: Implemented Contact isolation as lab called and stated that L leg cultures were growing S aureus, MRSA by screening. MD made aware and isolation was initiated. Educated patient of this and isolation precautions. Educated on handwashing and clean gown if leaving the room. Educated on staff gowning in and out of room.

## 2021-04-13 NOTE — WOUND TEAM
[facility]  Inpatient Services  Initial Wound and Skin Care Evaluation     Admission Date: 4/10/2021     Last order of IP CONSULT TO WOUND CARE was found on 2021 from Hospital Encounter on 4/10/2021      HPI, PMH, SH: Reviewed          Past Surgical History:   Procedure Laterality Date   • IRRIGATION & DEBRIDEMENT ORTHO Left 2020     Procedure: IRRIGATION AND DEBRIDEMENT, WOUND - LEG;  Surgeon: Barrie Israel M.D.;  Location: SURGERY [facility];  Service: Orthopedics   • IRRIGATION & DEBRIDEMENT ORTHO Left 2020     Procedure: IRRIGATION AND DEBRIDEMENT, WOUND - LEG;  Surgeon: Pasquale John M.D.;  Location: SURGERY [facility];  Service: Orthopedics   • IRRIGATION & DEBRIDEMENT ORTHO Left 2020     Procedure: IRRIGATION AND DEBRIDEMENT, WOUND-FOOT;  Surgeon: Barrie Israel M.D.;  Location: SURGERY [facility];  Service: Orthopedics   • IRRIGATION & DEBRIDEMENT ORTHO Left 2020     Procedure: IRRIGATION AND DEBRIDEMENT, WOUND - LEG;  Surgeon: Barrie Israel M.D.;  Location: SURGERY [facility];  Service: Orthopedics   • TIBIA ORIF Right    • SHOULDER ORIF Left    • HAND SURGERY Left       due to infection   • MT DRAIN SKIN ABSCESS SIMPLE Left       leg, near the knee, remote      Social History            Tobacco Use   • Smoking status: Former Smoker       Packs/day: 0.00       Years: 48.00       Pack years: 0.00       Types: Cigarettes       Quit date: 2018       Years since quittin.3   • Smokeless tobacco: Never Used   • Tobacco comment: states he is using 7mg nicotine patch   Substance Use Topics   • Alcohol use: No       Alcohol/week: 7.2 oz       Types: 12 Cans of beer per week       Comment: history of alcoholism           Chief Complaint   Patient presents with   • Wound Check      Diagnosis: Wound infection [T14.8XXA, L08.9]     Unit where seen by Wound Team: S616/01      WOUND CONSULT/FOLLOW UP RELATED TO:  Bilateral lower legs         WOUND HISTORY:  65 y.o. male with a past medical history of Charcot's joint of the left foot, chronic lower extremity wounds secondary to venous stasis and history of hypertension who presented 4/10/2021 with increasing bilateral lower extremity pain and worsening bilateral wounds with increasing drainage.  Patient follows with outpatient wound care, per the chart it was recommended that he is go to the ER on 4/7/2021 for IV antibiotics and possible surgical intervention.  He had been treated with oral Cipro and completed this regimen on 3/31/2021, he states he had no improvement while on this.  He denies fever or chills but overall feels more fatigued.  He denies chest pain or shortness of breath, he denies nausea vomiting or diarrhea.      WOUND ASSESSMENT/LDA     Wound 04/11/21 Leg Lower Right (Active)   Wound Image       Site Assessment Pink;Red;Yellow    Periwound Assessment Dry;Scar tissue;Satellite lesions    Margins Undefined edges    Closure Secondary intention    Drainage Amount Small    Drainage Description Tan    Treatments Cleansed;Site care;Compression    Wound Cleansing Approved Wound Cleanser    Periwound Protectant Barrier Paste    Dressing Cleansing/Solutions Not Applicable    Dressing Options Hydrofiber Silver    Dressing Changed New    Dressing Status Clean;Dry;Intact    Dressing Change/Treatment Frequency Every 48 hrs, and As Needed    NEXT Dressing Change/Treatment Date 04/14/21    NEXT Weekly Photo (Inpatient Only) 04/19/21    Non-staged Wound Description Full thickness    Shape irregular    Wound Odor Mild    Pulses 2+    Exposed Structures None    WOUND NURSE ONLY - Time Spent with Patient (mins) 75        Wound 04/11/21 Leg Lower Left (Active)   Wound Image        Site Assessment Pink;Red;Yellow    Periwound Assessment Dry;Scar tissue;Satellite lesions    Margins Undefined edges    Closure Secondary intention    Drainage Amount Small    Drainage Description Tan    Treatments  Cleansed;Site care;Compression    Wound Cleansing Normal Saline Irrigation    Periwound Protectant Barrier Paste    Dressing Cleansing/Solutions Not Applicable    Dressing Options Hydrofiber Silver    Dressing Changed New    Dressing Status Clean;Dry;Intact    Dressing Change/Treatment Frequency Every 48 hrs, and As Needed    NEXT Dressing Change/Treatment Date 21    NEXT Weekly Photo (Inpatient Only) 21    Non-staged Wound Description Full thickness    Shape irregular    Wound Odor Mild    Pulses 2+    Exposed Structures None    WOUND NURSE ONLY - Time Spent with Patient (mins) 75       Vascular:     BHUPINDER:   BHUPINDER Results, Last 30 Days US-BHUPINDER SINGLE LEVEL BILAT     Result Date: 2021  Narrative  Vascular Laboratory  Conclusions  There is no evidence of major arterial disease demonstrated.  MARCELLE MICHELLE  Age:    65    Gender:     M  MRN:    [id number]  :    1955      BSA:  Exam Date:     2021 14:01  Room #:     Inpatient  Priority:     Routine  Ht (in):             Wt (lb):  Ordering Physician:        GINA CORRAL                              ([id number])  Referring Physician:       SHAYNA Olivares  Sonographer:               Clayton Pedroza RDCS,                             DELILAHT  Study Type:                Complete Bilateral  Technical Quality:         Adequate  Indications:     Ulcer of lower extremity  CPT Codes:       65468  ICD Codes:       707.1  History:  Limitations:                 RIGHT  Waveform            Systolic BPs (mmHg)                             140           Brachial  Triphasic                                Common Femoral  Triphasic                  170           Posterior Tibial  Bi, non-                   169           Dorsalis Pedis  reversed                                           Peroneal                             1.21          BHUPINDER                                           TBI                       LEFT  Waveform         Systolic BPs (mmHg)                             139           Brachial  Triphasic                                Common Femoral  Biphasic                   170           Posterior Tibial  Biphasic                   160           Dorsalis Pedis                                           Peroneal                             1.21          BHUPINDER                                           TBI  Findings  Right.  Doppler waveforms of the common femoral, popliteal, posterior tibial  arteries are of high amplitude and triphasic.  Doppler waveform of the dorsalis pedis artery is of high amplitude and  biphasic.  Ankle-brachial index is normal.  Toe-brachial index is normal.  Right: 1.22  Left.  Doppler waveform of the common femoral artery is of high amplitude and  triphasic.  Doppler waveforms of the popliteal, posterior tibial and dorsalis pedis  arteries are of high amplitude and biphasic.  Ankle-brachial index is normal.  Toe-brachial index is normal.  Left TBI: 1.16  Star Echevarria  (Electronically Signed)  Final Date:      2021                   15:29     BHUPINDER Results, Last 30 Days US-EXTREMITY ARTERY LOWER BILAT     Result Date: 2021  Narrative Lower Extremity  Arterial Duplex Report  Vascular Laboratory  CONCLUSIONS  Left superficial femoral, profunda femoral, popliteal and tibial artery  stenoses 50-75%.  Right no stenosis.  MARCELLE MICHELLE  Exam Date:     2021 12:00  Room #:     Inpatient  Priority:     Stat  Ht (in):     72      Wt (lb):     176  Ordering Physician:        ALDO DE LA GARZA  Referring Physician:  Sonographer:               Iesha Marcelino RDCS, RVT  Study Type:                Complete Bilateral  Technical Quality:         Adequate  Age:    65    Gender:     M  MRN:    [id number]  :    1955      BSA:    2.02  Indications:     Ulceration of LE  CPT Codes:       06469  ICD Codes:       707.1  History:         Bilateral lower extremity non-healing  wound.  Limitations:                RIGHT  Waveform        Peak Systolic Velocity (cm/s)                  Prox    Prox-Mid  Mid    Mid-Dist  Distal  Triphasic                         119                      CFA  Triphasic       136                                        PFA  Triphasic       147               134              150     SFA  Triphasic                         151                      POP  Hyperemic       104                                100     AT  Hyperemic       81                                 88      PT  Hyperemic       98                                 72      BRIGIDO                LEFT  Waveform        Peak Systolic Velocity (cm/s)                  Prox    Prox-Mid  Mid    Mid-Dist  Distal  Monophasic                        157                      CFA  Monophasic      277                                        PFA                  271               167              166     SFA  Monophasic                        212                      POP  Monophasic      237                                72      AT  Monophasic      171                                112     PT  Monophasic      89                                 125     BRIGIDO  FINDINGS  Right.  Plaque is seen in the common femoral artery  with no elevated velocities to  indicate hemodynamic significance.  Waveforms at the ankle are brisk and hyperemic.  Left.  Stenosis of the left proximal  femoral andprofunda femoral arteries .  Velocities are consistent with 50-75% stenosis as well as left popliteal  artery and proximal anterior tibial artery..  Waveforms at the ankle are monophasic with adequate  amplitude.  Narciso Hernández  (Electronically Signed)  Final Date:      11 April 2021                   16:10        Lab Values:           Lab Results   Component Value Date/Time     WBC 5.7 04/12/2021 02:24 AM     RBC 3.66 (L) 04/12/2021 02:24 AM     HEMOGLOBIN 11.1 (L) 04/12/2021 02:24 AM     HEMATOCRIT 35.6 (L) 04/12/2021 02:24 AM      CREACTPROT 13.20 (H) 04/11/2021 12:32 AM     SEDRATEWES 116 (H) 04/11/2021 12:32 AM     HBA1C 5.7 (H) 01/23/2020 11:22 AM         Culture Results show:  No results found for this or any previous visit (from the past 720 hour(s)).     Pain Level/Medicated:  Patient medicared with oral pain meds        INTERVENTIONS BY WOUND TEAM:  Chart and images reviewed. Discussed with bedside RN. All areas of concern (based on picture review, LDA review and discussion with bedside RN) have been thoroughly assessed. Documentation of areas based on significant findings. This RN in to assess patient. Performed standard wound care which includes appropriate positioning, dressing removal and non-selective debridement. Pictures and measurements obtained weekly if/when required.  Preparation for Dressing removal: Dressing soaked with normal saline  Cleansed with:  n/s and gauze.  Sharp debridement: na  Marylou wound: Cleansed with n/s, Prepped with barrier paste around wound edges  Primary Dressing: Aquacel Ag  ABD pad  Secondary (Outer) Dressing: Two layer wrap   Interdisciplinary consultation: Patient, Bedside RN, Nai     EVALUATION / RATIONALE FOR TREATMENT:  Most Recent Date:  4/12/21: Per APRN patient not a candididate for surgery, placed Two layer wrap bilateral.    4/11/2021: bilateral legs are dry calleous, with posterior venous ulcers.  Per patient they have been hurting more.  Kept dressing simply pending surgical consult.     Goals: Steady decrease in wound area and depth weekly.     WOUND TEAM PLAN OF CARE ([X] for frequency of wound follow up,): X  Nursing to follow orders written for wound care. Contact wound team if area fails to progress, deteriorates or with any questions/concerns  Dressing changes by wound team:                   Follow up 3 times weekly:    X            NPWT change 3 times weekly:     Follow up 1-2 times weekly:     Follow up Bi-Monthly:                   Follow up as needed:     Other (explain):       NURSING PLAN OF CARE ORDERS (X):  Dressing changes: See Dressing Care orders: X  Skin care: See Skin Care orders: X  RN Prevention Protocol: X  Rectal tube care: See Rectal Tube Care orders:   Other orders:     RSKIN:   CURRENTLY IN PLACE (X), APPLIED THIS VISIT (A), ORDERED (O):   Q shift Vahe:  X  Q shift pressure point assessments:  X     Surface/Positioning X  Pressure redistribution mattress     X       Low Airloss          Bariatric foam      Bariatric LUBA     Waffle cushion        Waffle Overlay   X       Reposition q 2 hours      TAPs Turning system     Z Jens Pillow      Offloading/Redistribution X  Sacral Mepilex (Silicone dressing)     Heel Mepilex (Silicone dressing) A        Heel float boots (Prevalon boot)             Float Heels off Bed with Pillows            Respiratory room air  Silicone O2 tubing         Gray Foam Ear protectors     Cannula fixation Device (Tender )          High flow offloading Clip    Elastic head band offloading device      Anchorfast                                                         Trach with Optifoam split foam             Containment/Moisture Prevention  Patient is continent    Rectal tube or BMS    Purwick/Condom Cath        Armas Catheter    Barrier wipes           Barrier paste       Antifungal tx      Interdry         Mobilization sheree      Up to chair        Ambulate      PT/OT       Nutrition X      Dietician        Diabetes Education      PO  X   TF     TPN     NPO   # days      Other         Anticipated discharge plans: patient will need continued outpatient care.   LTACH:        SNF/Rehab:                  Home Health Care:           Outpatient Wound Center:            Self/Family Care:        Other:

## 2021-04-13 NOTE — PROGRESS NOTES
Luis from lab called to say that the L leg culture was growing s aures MRSA by screening. MD Jhaveri updated via voalte.

## 2021-04-13 NOTE — WOUND TEAM
[facility]  Inpatient Services  Initial Wound and Skin Care Evaluation    Admission Date: 4/10/2021     Last order of IP CONSULT TO WOUND CARE was found on 2021 from Hospital Encounter on 4/10/2021     HPI, PMH, SH: Reviewed    Past Surgical History:   Procedure Laterality Date   • IRRIGATION & DEBRIDEMENT ORTHO Left 2020    Procedure: IRRIGATION AND DEBRIDEMENT, WOUND - LEG;  Surgeon: Barrie Israel M.D.;  Location: SURGERY [facility];  Service: Orthopedics   • IRRIGATION & DEBRIDEMENT ORTHO Left 2020    Procedure: IRRIGATION AND DEBRIDEMENT, WOUND - LEG;  Surgeon: Pasquale John M.D.;  Location: SURGERY [facility];  Service: Orthopedics   • IRRIGATION & DEBRIDEMENT ORTHO Left 2020    Procedure: IRRIGATION AND DEBRIDEMENT, WOUND-FOOT;  Surgeon: Barrie Israel M.D.;  Location: SURGERY [facility];  Service: Orthopedics   • IRRIGATION & DEBRIDEMENT ORTHO Left 2020    Procedure: IRRIGATION AND DEBRIDEMENT, WOUND - LEG;  Surgeon: Barrie Israel M.D.;  Location: SURGERY [facility];  Service: Orthopedics   • TIBIA ORIF Right    • SHOULDER ORIF Left    • HAND SURGERY Left     due to infection   • FL DRAIN SKIN ABSCESS SIMPLE Left     leg, near the knee, remote     Social History     Tobacco Use   • Smoking status: Former Smoker     Packs/day: 0.00     Years: 48.00     Pack years: 0.00     Types: Cigarettes     Quit date: 2018     Years since quittin.3   • Smokeless tobacco: Never Used   • Tobacco comment: states he is using 7mg nicotine patch   Substance Use Topics   • Alcohol use: No     Alcohol/week: 7.2 oz     Types: 12 Cans of beer per week     Comment: history of alcoholism      Chief Complaint   Patient presents with   • Wound Check     Diagnosis: Wound infection [T14.8XXA, L08.9]    Unit where seen by Wound Team: S616/01     WOUND CONSULT/FOLLOW UP RELATED TO:  Bilateral lower legs     WOUND HISTORY:  65 y.o. male with a past  medical history of Charcot's joint of the left foot, chronic lower extremity wounds secondary to venous stasis and history of hypertension who presented 4/10/2021 with increasing bilateral lower extremity pain and worsening bilateral wounds with increasing drainage.  Patient follows with outpatient wound care, per the chart it was recommended that he is go to the ER on 4/7/2021 for IV antibiotics and possible surgical intervention.  He had been treated with oral Cipro and completed this regimen on 3/31/2021, he states he had no improvement while on this.  He denies fever or chills but overall feels more fatigued.  He denies chest pain or shortness of breath, he denies nausea vomiting or diarrhea.     WOUND ASSESSMENT/LDA  Wound 04/11/21 Leg Lower Right (Active)   Wound Image       Site Assessment Red, pink, yellow, dry    Periwound Assessment PATITO    Margins Undefined edges    Closure Secondary intention    Drainage Amount Moderate    Drainage Description Tan    Treatments Cleansed;Site care;Offloading;Compression    Wound Cleansing Normal Saline Irrigation    Periwound Protectant Not Applicable    Dressing Cleansing/Solutions Not Applicable    Dressing Options Petroleum Gauze (clear);Dry Roll Gauze;Tubigrip    Dressing Changed New    Dressing Status Clean;Dry;Intact    Dressing Change/Treatment Frequency Every 48 hrs, and As Needed    NEXT Dressing Change/Treatment Date 04/13/21    NEXT Weekly Photo (Inpatient Only) 04/13/21    Non-staged Wound Description Full thickness    Shape irregular    Wound Odor Foul    Pulses 2+    Exposed Structures None    WOUND NURSE ONLY - Time Spent with Patient (mins) 75    Number of days: 1       Wound 04/11/21 Leg Lower Left (Active)   Wound Image        Site Assessment PATITO    Periwound Assessment PATITO    Margins Undefined edges    Closure Secondary intention    Drainage Amount Moderate    Drainage Description Tan    Treatments Cleansed;Site care;Compression    Wound Cleansing Normal  Saline Irrigation    Periwound Protectant Not Applicable    Dressing Cleansing/Solutions Not Applicable    Dressing Options Petroleum Gauze (clear);Dry Roll Gauze;Tubigrip    Dressing Changed New    Dressing Status Clean;Dry;Intact    Dressing Change/Treatment Frequency Every 48 hrs, and As Needed    NEXT Weekly Photo (Inpatient Only) 21    Non-staged Wound Description Full thickness    Shape irregular    Wound Odor Foul    Pulses 2+    Exposed Structures None    WOUND NURSE ONLY - Time Spent with Patient (mins) 75    Number of days: 1       Wound 21 Heel Right DTI (Active)   Wound Image      Site Assessment PATITO    Periwound Assessment PATITO    Margins Defined edges;Attached edges    Closure Secondary intention    Drainage Amount None    Treatments Cleansed;Site care;Offloading    Wound Cleansing Normal Saline Irrigation    Periwound Protectant Not Applicable    Dressing Cleansing/Solutions Not Applicable    Dressing Options Mepilex Heel    Dressing Changed New    Dressing Status Clean;Dry;Intact    Dressing Change/Treatment Frequency Every 72 hrs, and As Needed    NEXT Dressing Change/Treatment Date 21    NEXT Weekly Photo (Inpatient Only) 21    Pressure Injury Stage DTPI    Shape circular    Wound Odor None    Pulses 2+    Exposed Structures None    WOUND NURSE ONLY - Time Spent with Patient (mins) 75    Number of days: 1        Vascular:    BHUPINDER:   BHUPINDER Results, Last 30 Days US-BHUPINDER SINGLE LEVEL BILAT    Result Date: 2021  Narrative  Vascular Laboratory  Conclusions  There is no evidence of major arterial disease demonstrated.  MARCELLE MICHELLE  Age:    65    Gender:     M  MRN:    [id number]  :    1955      BSA:  Exam Date:     2021 14:01  Room #:     Inpatient  Priority:     Routine  Ht (in):             Wt (lb):  Ordering Physician:        GINA CORRAL                              ([id number])  Referring Physician:       [id number]                              GINA  Sonographer:               Clayton Pedroza RDCS,                             RVT  Study Type:                Complete Bilateral  Technical Quality:         Adequate  Indications:     Ulcer of lower extremity  CPT Codes:       55154  ICD Codes:       707.1  History:  Limitations:                 RIGHT  Waveform            Systolic BPs (mmHg)                             140           Brachial  Triphasic                                Common Femoral  Triphasic                  170           Posterior Tibial  Bi, non-                   169           Dorsalis Pedis  reversed                                           Peroneal                             1.21          BHUPINDER                                           TBI                       LEFT  Waveform        Systolic BPs (mmHg)                             139           Brachial  Triphasic                                Common Femoral  Biphasic                   170           Posterior Tibial  Biphasic                   160           Dorsalis Pedis                                           Peroneal                             1.21          BHUPINDER                                           TBI  Findings  Right.  Doppler waveforms of the common femoral, popliteal, posterior tibial  arteries are of high amplitude and triphasic.  Doppler waveform of the dorsalis pedis artery is of high amplitude and  biphasic.  Ankle-brachial index is normal.  Toe-brachial index is normal.  Right: 1.22  Left.  Doppler waveform of the common femoral artery is of high amplitude and  triphasic.  Doppler waveforms of the popliteal, posterior tibial and dorsalis pedis  arteries are of high amplitude and biphasic.  Ankle-brachial index is normal.  Toe-brachial index is normal.  Left TBI: 1.16  Star Echevarria  (Electronically Signed)  Final Date:      12 April 2021                   15:29    BHUPINDER Results, Last 30 Days US-EXTREMITY ARTERY LOWER BILAT    Result Date: 4/11/2021  Narrative Lower  Extremity  Arterial Duplex Report  Vascular Laboratory  CONCLUSIONS  Left superficial femoral, profunda femoral, popliteal and tibial artery  stenoses 50-75%.  Right no stenosis.  MARCELLE MICHELLE  Exam Date:     2021 12:00  Room #:     Inpatient  Priority:     Stat  Ht (in):     72      Wt (lb):     176  Ordering Physician:        ALDO DEL A GARZA  Referring Physician:  Sonographer:               Iesha Marcelino RDCS, DELILAHT  Study Type:                Complete Bilateral  Technical Quality:         Adequate  Age:    65    Gender:     M  MRN:    [id number]  :    1955      BSA:    2.02  Indications:     Ulceration of LE  CPT Codes:       86105  ICD Codes:       707.1  History:         Bilateral lower extremity non-healing wound.  Limitations:                RIGHT  Waveform        Peak Systolic Velocity (cm/s)                  Prox    Prox-Mid  Mid    Mid-Dist  Distal  Triphasic                         119                      CFA  Triphasic       136                                        PFA  Triphasic       147               134              150     SFA  Triphasic                         151                      POP  Hyperemic       104                                100     AT  Hyperemic       81                                 88      PT  Hyperemic       98                                 72      BRIGIDO                LEFT  Waveform        Peak Systolic Velocity (cm/s)                  Prox    Prox-Mid  Mid    Mid-Dist  Distal  Monophasic                        157                      CFA  Monophasic      277                                        PFA                  271               167              166     SFA  Monophasic                        212                      POP  Monophasic      237                                72      AT  Monophasic      171                                112     PT  Monophasic      89                                 125     BRIGIDO  FINDINGS   Right.  Plaque is seen in the common femoral artery  with no elevated velocities to  indicate hemodynamic significance.  Waveforms at the ankle are brisk and hyperemic.  Left.  Stenosis of the left proximal  femoral andprofunda femoral arteries .  Velocities are consistent with 50-75% stenosis as well as left popliteal  artery and proximal anterior tibial artery..  Waveforms at the ankle are monophasic with adequate  amplitude.  Narciso Higginsry  (Electronically Signed)  Final Date:      11 April 2021                   16:10      Lab Values:    Lab Results   Component Value Date/Time    WBC 5.7 04/12/2021 02:24 AM    RBC 3.66 (L) 04/12/2021 02:24 AM    HEMOGLOBIN 11.1 (L) 04/12/2021 02:24 AM    HEMATOCRIT 35.6 (L) 04/12/2021 02:24 AM    CREACTPROT 13.20 (H) 04/11/2021 12:32 AM    SEDRATEWES 116 (H) 04/11/2021 12:32 AM    HBA1C 5.7 (H) 01/23/2020 11:22 AM        Culture Results show:  No results found for this or any previous visit (from the past 720 hour(s)).    Pain Level/Medicated:  Patient medicared with oral pain meds       INTERVENTIONS BY WOUND TEAM:  Chart and images reviewed. Discussed with bedside RN. All areas of concern (based on picture review, LDA review and discussion with bedside RN) have been thoroughly assessed. Documentation of areas based on significant findings. This RN in to assess patient. Performed standard wound care which includes appropriate positioning, dressing removal and non-selective debridement. Pictures and measurements obtained weekly if/when required.  Preparation for Dressing removal: Dressing soaked with normal saline  Cleansed with:  n/s and gauze.  Sharp debridement: na  Marylou wound: Cleansed with n/s, Prepped with n/a  Primary Dressing: perolum gauze clear, ABD pad  Secondary (Outer) Dressing: dry roll gauze    Interdisciplinary consultation: Patient, Bedside RN , Alyssa ANGULO    EVALUATION / RATIONALE FOR TREATMENT:  Most Recent Date:  4/11/2021: bilateral legs are dry  calleous, with posterior venous ulcers.  Per patient they have been hurting more.  Kept dressing simply pending surgical consult.     Goals: Steady decrease in wound area and depth weekly.    WOUND TEAM PLAN OF CARE ([X] for frequency of wound follow up,): X  Nursing to follow orders written for wound care. Contact wound team if area fails to progress, deteriorates or with any questions/concerns  Dressing changes by wound team:                   Follow up 3 times weekly:                NPWT change 3 times weekly:     Follow up 1-2 times weekly:   X   Follow up Bi-Monthly:                   Follow up as needed:     Other (explain):     NURSING PLAN OF CARE ORDERS (X):  Dressing changes: See Dressing Care orders: X  Skin care: See Skin Care orders: X  RN Prevention Protocol: X  Rectal tube care: See Rectal Tube Care orders:   Other orders:    RSKIN:   CURRENTLY IN PLACE (X), APPLIED THIS VISIT (A), ORDERED (O):   Q shift Vahe:  X  Q shift pressure point assessments:  X    Surface/Positioning X  Pressure redistribution mattress     X       Low Airloss          Bariatric foam      Bariatric LUBA     Waffle cushion        Waffle Overlay   X       Reposition q 2 hours      TAPs Turning system     Z Jens Pillow     Offloading/Redistribution X  Sacral Mepilex (Silicone dressing)     Heel Mepilex (Silicone dressing) A        Heel float boots (Prevalon boot)             Float Heels off Bed with Pillows           Respiratory room air  Silicone O2 tubing         Gray Foam Ear protectors     Cannula fixation Device (Tender )          High flow offloading Clip    Elastic head band offloading device      Anchorfast                                                         Trach with Optifoam split foam             Containment/Moisture Prevention  Patient is continent    Rectal tube or BMS    Purwick/Condom Cath        Armas Catheter    Barrier wipes           Barrier paste       Antifungal tx      Interdry        Mobilization  sheree      Up to chair        Ambulate      PT/OT      Nutrition X      Dietician        Diabetes Education      PO  X   TF     TPN     NPO   # days     Other        Anticipated discharge plans: patient will need continued outpatient care.   LTACH:        SNF/Rehab:                  Home Health Care:           Outpatient Wound Center:            Self/Family Care:        Other:

## 2021-04-14 ENCOUNTER — PHARMACY VISIT (OUTPATIENT)
Dept: PHARMACY | Facility: MEDICAL CENTER | Age: 66
End: 2021-04-14
Payer: COMMERCIAL

## 2021-04-14 ENCOUNTER — APPOINTMENT (OUTPATIENT)
Dept: WOUND CARE | Facility: MEDICAL CENTER | Age: 66
End: 2021-04-14
Attending: NURSE PRACTITIONER
Payer: MEDICARE

## 2021-04-14 VITALS
DIASTOLIC BLOOD PRESSURE: 83 MMHG | OXYGEN SATURATION: 96 % | HEART RATE: 66 BPM | HEIGHT: 72 IN | WEIGHT: 176.81 LBS | RESPIRATION RATE: 19 BRPM | SYSTOLIC BLOOD PRESSURE: 159 MMHG | BODY MASS INDEX: 23.95 KG/M2 | TEMPERATURE: 96.9 F

## 2021-04-14 PROCEDURE — 97161 PT EVAL LOW COMPLEX 20 MIN: CPT

## 2021-04-14 PROCEDURE — 700102 HCHG RX REV CODE 250 W/ 637 OVERRIDE(OP): Performed by: STUDENT IN AN ORGANIZED HEALTH CARE EDUCATION/TRAINING PROGRAM

## 2021-04-14 PROCEDURE — A9270 NON-COVERED ITEM OR SERVICE: HCPCS | Performed by: INTERNAL MEDICINE

## 2021-04-14 PROCEDURE — A9270 NON-COVERED ITEM OR SERVICE: HCPCS | Performed by: STUDENT IN AN ORGANIZED HEALTH CARE EDUCATION/TRAINING PROGRAM

## 2021-04-14 PROCEDURE — 99239 HOSP IP/OBS DSCHRG MGMT >30: CPT | Performed by: STUDENT IN AN ORGANIZED HEALTH CARE EDUCATION/TRAINING PROGRAM

## 2021-04-14 PROCEDURE — 700102 HCHG RX REV CODE 250 W/ 637 OVERRIDE(OP): Performed by: INTERNAL MEDICINE

## 2021-04-14 PROCEDURE — RXMED WILLOW AMBULATORY MEDICATION CHARGE: Performed by: STUDENT IN AN ORGANIZED HEALTH CARE EDUCATION/TRAINING PROGRAM

## 2021-04-14 RX ORDER — LISINOPRIL 10 MG/1
10 TABLET ORAL DAILY
Qty: 30 TABLET | Refills: 0 | Status: SHIPPED | OUTPATIENT
Start: 2021-04-15 | End: 2021-08-31

## 2021-04-14 RX ORDER — OXYCODONE HYDROCHLORIDE 5 MG/1
5 TABLET ORAL EVERY 6 HOURS PRN
Qty: 20 TABLET | Refills: 0 | Status: SHIPPED | OUTPATIENT
Start: 2021-04-14 | End: 2021-04-19

## 2021-04-14 RX ADMIN — OXYCODONE 5 MG: 5 TABLET ORAL at 04:47

## 2021-04-14 RX ADMIN — OXYCODONE 5 MG: 5 TABLET ORAL at 11:09

## 2021-04-14 RX ADMIN — OXYCODONE 5 MG: 5 TABLET ORAL at 16:32

## 2021-04-14 RX ADMIN — LISINOPRIL 10 MG: 10 TABLET ORAL at 04:44

## 2021-04-14 RX ADMIN — DOCUSATE SODIUM 50 MG AND SENNOSIDES 8.6 MG 2 TABLET: 8.6; 5 TABLET, FILM COATED ORAL at 08:37

## 2021-04-14 RX ADMIN — FERROUS GLUCONATE 324 MG: 324 TABLET ORAL at 08:22

## 2021-04-14 RX ADMIN — OXYCODONE 5 MG: 5 TABLET ORAL at 08:22

## 2021-04-14 RX ADMIN — OXYCODONE HYDROCHLORIDE AND ACETAMINOPHEN 500 MG: 500 TABLET ORAL at 04:44

## 2021-04-14 RX ADMIN — AMLODIPINE BESYLATE 10 MG: 10 TABLET ORAL at 04:44

## 2021-04-14 RX ADMIN — SERTRALINE HYDROCHLORIDE 100 MG: 100 TABLET, FILM COATED ORAL at 04:44

## 2021-04-14 RX ADMIN — ASPIRIN 81 MG: 81 TABLET, CHEWABLE ORAL at 04:44

## 2021-04-14 ASSESSMENT — PAIN DESCRIPTION - PAIN TYPE
TYPE: CHRONIC PAIN
TYPE: ACUTE PAIN

## 2021-04-14 ASSESSMENT — COGNITIVE AND FUNCTIONAL STATUS - GENERAL
SUGGESTED CMS G CODE MODIFIER MOBILITY: CI
CLIMB 3 TO 5 STEPS WITH RAILING: A LITTLE
MOBILITY SCORE: 23

## 2021-04-14 ASSESSMENT — GAIT ASSESSMENTS
DISTANCE (FEET): 200
GAIT LEVEL OF ASSIST: SUPERVISED
DEVIATION: BRADYKINETIC
ASSISTIVE DEVICE: FRONT WHEEL WALKER

## 2021-04-14 NOTE — DISCHARGE PLANNING
Received Choice form at 1512  Agency/Facility Name: [facility]  Referral sent per Choice form @ 5548

## 2021-04-14 NOTE — CARE PLAN
"  Problem: Pain Management  Goal: Pain level will decrease to patient's comfort goal  Outcome: PROGRESSING AS EXPECTED  Intervention: Follow pain managment plan developed in collaboration with patient and Interdisciplinary Team  Note: Pt continues to manage pain w/ 5mg oxycodone     Problem: Bowel/Gastric:  Goal: Normal bowel function is maintained or improved  Outcome: PROGRESSING AS EXPECTED  Intervention: Educate patient and significant other/support system about diet, fluid intake, medications and activity to promote bowel function  Note: Pt encouraged to take \"held\" Senna this morning, educated on constipation w/ opioids and iron that he is taking, pt agreed to Senna     Problem: Discharge Barriers/Planning  Goal: Patient's continuum of care needs will be met  Outcome: MET  Note: Home health to follow up w/ ptGUADALUPE plan to leave around 1500, meds to beds following     "

## 2021-04-14 NOTE — PROGRESS NOTES
Alert and oriented x4. On contact isolation. Offered fluids and snacks. Safety precautions in placed. Bed in lowest position. Upper side rails up. Treaded socks on. Reinforced the use of call light when needing assistance.

## 2021-04-14 NOTE — PROGRESS NOTES
2 RN skin check complete w? KEN Chavez  Devices in place None.  Skin assessed under devices NA.  Confirmed pressure ulcers found on DTI to right heel, POA see LDA flowsheet.  New potential pressure ulcers noted on None. Wound consult placed NA. Was being followed for venous ulcers  The following interventions in place: dressing changed to pt's bilateral leg venous ulcers, DTI mepilex removed as pt is going home, new dressings replaced.

## 2021-04-14 NOTE — DISCHARGE PLANNING
Meds-to-Beds: Discharge prescription orders listed below delivered to patient's bedside. KEN Carranza notified. Patient counseled.     Lisinopril was delivered previously this morning at bedside.     Goran Chavez   Home Medication Instructions MAXIMILIANO:[id number]    Printed on:04/14/21 1557   Medication Information                      oxyCODONE immediate-release (ROXICODONE) 5 MG Tab  Take 1 tablet by mouth every 6 hours as needed for Severe Pain for up to 5 days.                 María Mckinney, PharmD

## 2021-04-14 NOTE — FACE TO FACE
Face to Face Supporting Documentation - Home Health    The encounter with this patient was in whole or in part the primary reason for home health admission.    Date of encounter:   Patient:                    MRN:                       YOB: 2021  Goran Chavez  [id number]  1955     Home health to see patient for:  Skilled Nursing care for assessment, interventions & education, Wound Care, Home health aide, Physical Therapy evaluation and treatment and Occupational therapy evaluation and treatment    Skilled need for:  Exacerbation of Chronic Disease State Chronic venous stasis with leg wounds    Skilled nursing interventions to include:  Wound Care and Comment: Continued home physical and occupational therapies    Homebound status evidenced by:  Need the aid of supportive devices such as crutches, canes, wheelchairs or walkers or Needs the assistance of another person in order to leave the home. Leaving home requires a considerable and taxing effort. There is a normal inability to leave the home.    Community Physician to provide follow up care: Marcela Ch M.D.     Optional Interventions? No      I certify the face to face encounter for this home health care referral meets the CMS requirements and the encounter/clinical assessment with the patient was, in whole, or in part, for the medical condition(s) listed above, which is the primary reason for home health care. Based on my clinical findings: the service(s) are medically necessary, support the need for home health care, and the homebound criteria are met.  I certify that this patient has had a face to face encounter by myself.  Lawrence Jhaveri M.D. - NPI: [id number]

## 2021-04-14 NOTE — DISCHARGE PLANNING
Medical Social Work  Patient has been accepted by [facility]    MAEGAN faxed transport communication form to [facility] for a 4:00 transport time home  [address]  [place] [id number]    PC to patient's daughter; message left will above information.    Maegan informed patient, who is happy to go home

## 2021-04-14 NOTE — DISCHARGE PLANNING
Agency/Facility Name: [facility]  Spoke To: Tena  Outcome: Tena called stating the patient has been on service with [facility] for about 9 months.  Pt's daughter gave [facility] in error.  DPA faxed a referral at 1617.    Agency/Facility Name: [facility]  Outcome: This DPA left a message for Lizette stating the pt has been on service with [facility] and to please cancel the referral.  SAMI will follow up with Lizette in the morning.

## 2021-04-14 NOTE — DISCHARGE PLANNING
Medical Social Work  PC to patient's daughter Megan [phone]; would like patient to come home with Home Health for wound care.  Doesn't care what provider [facility] uses as the previous one has stated that patient's insurance won't cover anymore.  Will be home after 3:30 if they are going to discharge patient today.    MAEGAN faxed Choice to [facility] J[facility]veri requesting order/face to face for home health, wound care

## 2021-04-14 NOTE — DISCHARGE PLANNING
Received Transport Form @ 0608  Spoke to Alejandrina @ [facility]    Transport is scheduled for 4/14/21 @[phone] going to [address]  [place] [address]    Quote for wheelchair transport is $110.72    LSMITCHELL Mann notified via Teams.    Reservation #[id number]    @9550  Cancel transport, per RN, daughter is coming to  the patient.  SAMI called [facility] and spoke to Alejandrina.  SAMI requested transport be cancelled.

## 2021-04-14 NOTE — PROGRESS NOTES
Called daughter Megan to update on POC. Discussed that per MD surgery wasn't indicated at this time and that we would have PT evaluate him tomorrow to assess for needs. She states that she is concerns as he has had these for many months and they have yet to heal. She states that they had home health but a nurse told them the insurance would no longer cover. She states patient had appointment to the clinic tomorrow and she cancelled this. She ideally would like for patient to discharge with HH to assist with wound care as patient would need to take bus to go to wound clinic. Will update SW in the AM

## 2021-04-14 NOTE — DISCHARGE INSTRUCTIONS
Discharge Instructions    Discharged to home by car with relative. Discharged via wheelchair, hospital escort: Yes.  Special equipment needed: Not Applicable    Be sure to schedule a follow-up appointment with your primary care doctor or any specialists as instructed.     Discharge Plan:   Diet Plan: Discussed  Activity Level: Discussed  Confirmed Follow up Appointment: Patient to Call and Schedule Appointment  Confirmed Symptoms Management: Discussed  Medication Reconciliation Updated: Yes    I understand that a diet low in cholesterol, fat, and sodium is recommended for good health. Unless I have been given specific instructions below for another diet, I accept this instruction as my diet prescription.   Other diet: Regular    Special Instructions: None    · Is patient discharged on Warfarin / Coumadin?   No     Depression / Suicide Risk    As you are discharged from this [facility] facility, it is important to learn how to keep safe from harming yourself.    Recognize the warning signs:  · Abrupt changes in personality, positive or negative- including increase in energy   · Giving away possessions  · Change in eating patterns- significant weight changes-  positive or negative  · Change in sleeping patterns- unable to sleep or sleeping all the time   · Unwillingness or inability to communicate  · Depression  · Unusual sadness, discouragement and loneliness  · Talk of wanting to die  · Neglect of personal appearance   · Rebelliousness- reckless behavior  · Withdrawal from people/activities they love  · Confusion- inability to concentrate     If you or a loved one observes any of these behaviors or has concerns about self-harm, here's what you can do:  · Talk about it- your feelings and reasons for harming yourself  · Remove any means that you might use to hurt yourself (examples: pills, rope, extension cords, firearm)  · Get professional help from the community (Mental Health, Substance Abuse, psychological  Follow up note:    to see pt for follow up in order to provide continued support.  The pt was sleeping, however pt's caregiver is in attendance and reports the pt is coping adequately at this time. Caregiver reports they are  waiting for team to decide on a few medical issues.   General support provided.   Transplant  will continue to follow as indicated.    counseling)  · Do not be alone:Call your Safe Contact- someone whom you trust who will be there for you.  · Call your local CRISIS HOTLINE 368-8687 or 799-906-4175  · Call your local Children's Mobile Crisis Response Team [place] (946) 598-9132 or www.Oodrive  · Call the toll free National Suicide Prevention Hotlines   · National Suicide Prevention Lifeline 323-081-ZLCA (6914)  · National Hope Line Network 800-SUICIDE (749-8643)

## 2021-04-14 NOTE — DISCHARGE SUMMARY
Discharge Summary    CHIEF COMPLAINT ON ADMISSION  Chief Complaint   Patient presents with   • Wound Check       Reason for Admission  Left leg cellulitis and venous stasis ulcer of LLE     Admission Date  4/10/2021    CODE STATUS  Full Code    HPI & HOSPITAL COURSE  Per HPI and Dr. Aponte's Interval progress:    65 y.o. male with a past medical history of Charcot's joint of the left foot, chronic lower extremity wounds secondary to venous stasis and hypertension presented 4/10/2021 with increasing bilateral lower extremity pain and worsening bilateral wounds with increasing drainage.  Patient follows with outpatient wound care, per the chart it was recommended that he goes to the ER on 4/7/2021 for IV antibiotics and possible surgical intervention.  He had been treated with oral Cipro and completed this regimen on 3/31/2021, he states he had no improvement while on this.  He denies fever or chills but overall feels more fatigued.  He denies chest pain or shortness of breath, he denies nausea vomiting or diarrhea. On physical examination the patient does have skin changes consistent with chronic venous insufficiency and large ulcers on his left lower extremity associated with purulent discharge concerning for peripheral arterial disease.      In the ER, temp is 98, HR 81, RR 14, /92, saturating well on room air. Labs are remarkable for normal WBC, H&H 13/41.6 electrolytes and renal function are relatively unremarkable.  Lactic acid of 1.7, elevated CRP of 13.2.      Previous wound cultures did grow staph aureus and Pseudomonas as well as Enterococcus faecalis. He has been started on broad-spectrum antibiotics based on previous cultures.     4/11/21 - 4/12/2021: Left lower extremity arterial Dopplers are consistent with peripheral arterial disease.  He has been started on statin therapy. Vascular surgery requested CTA of LE and BHUPINDER/PVR. He continued to show interval improvement on current antibiotic therapy. ID  team has been following for Abx recommendations.     4/13 under my care: Afebrile; elevated BP with SBP in 160s - lisinopril added.   Pain scale reported - 2-7 in left leg; adequately controlled on pain regimen. ABIs and TBIs normal; CTA is unimpressive (some femoral plaque).  Updated him about current plan from vascular surgery (no surgical intervention) and ID team (stopping Abx - No signs of acute infection; DC Abx, MRSA and Pseudomonas are likely colonizers). He was a bit confused about not needing procedures or treatments; in the same time, happy that no requiring a surgery.     4/14: vitals wnl. Home health with wound care referral placed. Plan to DC with follow up outpatient discussed.     Therefore, he is discharged in fair and stable condition to home with close outpatient follow-up.    The patient met 2-midnight criteria for an inpatient stay at the time of discharge.    Discharge Date  04/14/21    FOLLOW UP ITEMS POST DISCHARGE  N/A    DISCHARGE DIAGNOSES  Principal Problem:    Cellulitis POA: Yes  Active Problems:    Venous stasis ulcer of left lower extremity (HCC) POA: Yes      Overview: S/p venous ablation through vascular with improvement in wound healing,       using lymphedema pump and compression    Essential hypertension POA: Yes    CKD (chronic kidney disease) stage 3, GFR 30-59 ml/min POA: Yes    Venous stasis dermatitis of both lower extremities POA: Yes    Charcot's joint of left foot, non-diabetic POA: Yes    History of hepatitis C POA: Yes      Overview: S/p treatment in 2020    Methamphetamine abuse (HCC) POA: Yes    History of prediabetes POA: Yes    Peripheral artery disease (HCC) POA: Yes    Macrocytic anemia POA: Yes    Other hyperlipidemia POA: Yes  Resolved Problems:    * No resolved hospital problems. *      FOLLOW UP  Future Appointments   Date Time Provider Department Center   4/16/2021  3:30 PM Marcela Ch M.D. [facility]   4/21/2021  1:00 PM Sanju Guzman  [facility] [address]   4/28/2021  1:00 PM [facility] Fer[facility] [address]     [facility]  [address] [id number] [phone]        Marcela Ch M.D.  [address]  [place] [address]  [phone]            MEDICATIONS ON DISCHARGE     Medication List      START taking these medications      Instructions   lisinopril 10 MG Tabs  Start taking on: April 15, 2021  Commonly known as: PRINIVIL   Take 1 tablet by mouth every day.  Dose: 10 mg     oxyCODONE immediate-release 5 MG Tabs  Commonly known as: ROXICODONE   Take 1 tablet by mouth every 6 hours as needed for Severe Pain for up to 5 days.  Dose: 5 mg        CHANGE how you take these medications      Instructions   amitriptyline 25 MG Tabs  What changed: See the new instructions.  Commonly known as: ELAVIL   TAKE 1 TABLET BY MOUTH AT BEDTIME AS NEEDED FOR SLEEP (INCREASED DOSE)     amLODIPine 10 MG Tabs  What changed: when to take this  Commonly known as: NORVASC   Take 1 tablet by mouth once daily     ascorbic acid 500 MG tablet  What changed: when to take this  Commonly known as: Vitamin C   Take 1 tablet by mouth once daily     atorvastatin 40 MG Tabs  What changed: when to take this  Commonly known as: LIPITOR   Take 1 tablet by mouth once daily     meloxicam 7.5 MG Tabs  What changed: See the new instructions.  Commonly known as: MOBIC   TAKE 1 TABLET BY MOUTH ONCE DAILY AS NEEDED FOR MODERATE PAIN        CONTINUE taking these medications      Instructions   ferrous gluconate 324 (38 Fe) MG Tabs  Commonly known as: FERGON   Take 1 Tab by mouth every morning with breakfast.  Dose: 324 mg     sertraline 100 MG Tabs  Commonly known as: Zoloft   Take 1 Tab by mouth every day.  Dose: 100 mg        STOP taking these medications    dakins 0.125% (1/4 strength) 0.125 % Soln            Allergies  No Known Allergies    DIET  Orders Placed This Encounter   Procedures   • Diet Order Diet: Regular     Standing Status:    Standing     Number of Occurrences:   1     Order Specific Question:   Diet:     Answer:   Regular [1]       ACTIVITY  As tolerated.  Weight bearing as tolerated    CONSULTATIONS  Vascular surgery  Infectious disease    PROCEDURES  Imaging  CT-CTA AORTA-RO WITH & W/O-POST PROCESS   Final Result       1.  Prominent bilateral common femoral artery calcified plaque with greater than 50% diameter stenosis.   2.  Otherwise patent bilateral lower extremities with patent three-vessel runoff.   3.  Bilateral leg edema and/or cellulitis.   4.  Trace bilateral pleural effusions.   5.  Mild ascites.   6.  Bilateral renal cortical scarring.   7.  Enlarged iliac and inguinal lymph nodes which are nonspecific. This could be reactive however cannot exclude malignancy. Suggest follow-up.       US-BHUPINDER SINGLE LEVEL BILAT   Final Result       DX-FOOT-COMPLETE 3+ RIGHT   Final Result       1.  No radiographic evidence for calcaneal osteomyelitis       2.  osteoarthritis of the midfoot, forefoot, tibiotalar joint       3.  Multiple hammertoe deformities           DX-FOOT-COMPLETE 3+ LEFT   Final Result       1.  Osteoarthritis of the midfoot and forefoot       2.  Flattening of the longitudinal arch       3.  Multiple hammertoe deformities             LABORATORY  Lab Results   Component Value Date    SODIUM 140 04/13/2021    POTASSIUM 4.9 04/13/2021    CHLORIDE 112 04/13/2021    CO2 21 04/13/2021    GLUCOSE 89 04/13/2021    BUN 18 04/13/2021    CREATININE 1.00 04/13/2021        Lab Results   Component Value Date    WBC 5.4 04/13/2021    HEMOGLOBIN 11.9 (L) 04/13/2021    HEMATOCRIT 38.1 (L) 04/13/2021    PLATELETCT 365 04/13/2021        Total time of the discharge process exceeds 35 minutes.

## 2021-04-14 NOTE — PROGRESS NOTES
Pt educated on discharge information. Home health to follow up w/ patient at home w/ his wound care. Removed pt's bilateral leg dressings, as home health would not be able to see patient tomorrow after DC (wound nurse did not want current dressing to still be on for that long). Pt's PIVs removed, discharge medications via meds to beds given upon leaving hospital, left via WC to his sister to go home.

## 2021-04-14 NOTE — DISCHARGE PLANNING
Meds-to-Beds: Discharge prescription order listed below delivered to patient's bedside KNE Carranza. Patient counseled. Patient requested pain medication for discharge, RN notified.      Goran Chavez   Home Medication Instructions MAXIMILIANO:[id number]    Printed on:04/14/21 1331   Medication Information                      lisinopril (PRINIVIL) 10 MG Tab  Take 1 tablet by mouth every day.               Salma Robert, PharmD

## 2021-04-15 ENCOUNTER — PATIENT OUTREACH (OUTPATIENT)
Dept: HEALTH INFORMATION MANAGEMENT | Facility: OTHER | Age: 66
End: 2021-04-15

## 2021-04-15 NOTE — DISCHARGE PLANNING
Agency/Facility Name: [facility]   Spoke To: Lizette  Outcome: Lizette received voice message this DPA left yesterday and has canceled the home health referral.  Patient has been on service with [facility].

## 2021-04-16 LAB
BACTERIA BLD CULT: NORMAL
BACTERIA BLD CULT: NORMAL
SIGNIFICANT IND 70042: NORMAL
SIGNIFICANT IND 70042: NORMAL
SITE SITE: NORMAL
SITE SITE: NORMAL
SOURCE SOURCE: NORMAL
SOURCE SOURCE: NORMAL

## 2021-04-16 NOTE — DISCHARGE PLANNING
@[id number]  Resent HH referral to [facility] per request of agency    LSW Johnathan Machado notified

## 2021-04-21 ENCOUNTER — OFFICE VISIT (OUTPATIENT)
Dept: WOUND CARE | Facility: MEDICAL CENTER | Age: 66
End: 2021-04-21
Attending: NURSE PRACTITIONER
Payer: MEDICARE

## 2021-04-21 DIAGNOSIS — I87.8 CHRONIC VENOUS STASIS: ICD-10-CM

## 2021-04-21 DIAGNOSIS — M21.962 ACQUIRED DEFORMITY OF LEFT ANKLE AND FOOT: ICD-10-CM

## 2021-04-21 DIAGNOSIS — T14.8XXA PAIN ASSOCIATED WITH WOUND: ICD-10-CM

## 2021-04-21 DIAGNOSIS — T14.8XXA WOUND INFECTION: ICD-10-CM

## 2021-04-21 DIAGNOSIS — R52 PAIN ASSOCIATED WITH WOUND: ICD-10-CM

## 2021-04-21 DIAGNOSIS — L08.9 WOUND INFECTION: ICD-10-CM

## 2021-04-21 DIAGNOSIS — L97.922 SKIN ULCER OF LEFT LOWER LEG WITH FAT LAYER EXPOSED (HCC): Primary | ICD-10-CM

## 2021-04-21 PROCEDURE — 11042 DBRDMT SUBQ TIS 1ST 20SQCM/<: CPT

## 2021-04-21 PROCEDURE — 99213 OFFICE O/P EST LOW 20 MIN: CPT | Mod: 25

## 2021-04-21 PROCEDURE — 99213 OFFICE O/P EST LOW 20 MIN: CPT | Mod: 25 | Performed by: NURSE PRACTITIONER

## 2021-04-21 PROCEDURE — 11045 DBRDMT SUBQ TISS EACH ADDL: CPT | Performed by: NURSE PRACTITIONER

## 2021-04-21 PROCEDURE — 11045 DBRDMT SUBQ TISS EACH ADDL: CPT

## 2021-04-21 PROCEDURE — 11042 DBRDMT SUBQ TIS 1ST 20SQCM/<: CPT | Performed by: NURSE PRACTITIONER

## 2021-04-21 ASSESSMENT — ENCOUNTER SYMPTOMS
PALPITATIONS: 0
SHORTNESS OF BREATH: 0
NAUSEA: 0
WEAKNESS: 0
CHILLS: 0
HEADACHES: 0
CLAUDICATION: 0
FEVER: 0
WHEEZING: 0
VOMITING: 0
MYALGIAS: 0
DEPRESSION: 1
COUGH: 0
DIZZINESS: 0

## 2021-04-21 NOTE — PROGRESS NOTES
Provider Encounter- Full Thickness wound    HISTORY OF PRESENT ILLNESS  Wound History:    START OF CARE IN CLINIC: 5/20/2020               REFERRING PROVIDER: KEV Keith                 WOUND ETIOLOGY: venous,  likely mixed etiology              LOCATION: Left lower leg, multiple wounds                                   Right lower leg, multiple wounds-first observed in clinic on initial evaluation, 6/5/2020.  Resolved 9/2/2020                HISTORY: Patient is very well-known to this clinic from previous treatment of wounds to his left lower extremity.  He was discharged from the clinic in March 2020 due to full resolution of his wound.  He returned to the clinic approximately 3 weeks later, on 4/16, with cellulitis, edema, and large open wounds to his left lower leg, and was sent directly to [facility] emergency room.  He was admitted for IV antibiotics and surgical intervention.  On 4/22/2020, he underwent an I&D of his left lower extremity, fasciotomy, and VAC placement.  In the following weeks he underwent surgery 3 more times for irrigation and debridement.  He was discharged home on 5/21, on the VAC, with home health and a referral to [facility].      4/21/2021: Patient was recently discharged from [facility] following being sent from the wound care clinic.  There the patient received IV antibiotics and underwent arterial imaging.  Patient returns to [facility] [place] for care of bilateral lower extremity wounds.      Pertinent Medical History:  Anxiety, Charcot's joint of left foot, non-diabetic (3/21/2016), Chronic congestive heart failure (HCC) (11/16/2017), Hepatitis C, chronic ([facility]), Hypertension, Migraine, Polysubstance abuse ([facility]) (3/8/2018), Tobacco use (4/18/2016), Ulcer of left lower extremity with necrosis of muscle ([facility]) (3/21/2016), and Venous stasis ulcer ([facility]) (2017).                ETIOLOGY HISTORY:  Vascular Surgeon: Dr. White. Compression Circ-aid. Varicose Veins none  visible     TOBACCO USE: Patient denies; patient also denies alcohol and recreational drug use    Patient's problem list, allergies, and current medications reviewed and updated in Epic    Interval History:  Interval History thinned 2/10/2021.  Please see previous notes for complete interval history.       4/21/2021: Clinic visit with KEV Luna. Patient states that they are feeling well today.  Patient denies fever, chills, nausea, vomiting, lightheadedness, dizziness, shortness of breath and chest pain.     REVIEW OF SYSTEMS:   Review of Systems   Constitutional: Negative for chills and fever.        States he is eating well, appetite normal   Respiratory: Negative for cough, shortness of breath and wheezing.    Cardiovascular: Positive for leg swelling. Negative for chest pain, palpitations and claudication.        Chronic swelling in legs, for several years   Gastrointestinal: Negative for nausea and vomiting.   Musculoskeletal: Positive for joint pain. Negative for myalgias.        Chronic   Skin:        Multiple diffuse open wounds to left lower extremity a few new open wounds to right lower extremity.  Pseudomonal drainage to left lower extremity   Neurological: Negative for dizziness, weakness and headaches.   Psychiatric/Behavioral: Positive for depression.       PHYSICAL EXAMINATION:   There were no vitals taken for this visit.    Physical Exam   Constitutional: He is oriented to person, place, and time and well-developed, well-nourished, and in no distress. No distress.   HENT:   Head: Normocephalic and atraumatic.   Eyes: Pupils are equal, round, and reactive to light. Conjunctivae and EOM are normal.   Cardiovascular: Intact distal pulses.   Pulmonary/Chest: Effort normal. No respiratory distress. He has no wheezes.   Musculoskeletal:         General: Deformity present. No edema. Normal range of motion.      Cervical back: Neck supple.      Comments: Left charcot foot and ankle      Neurological: He is alert and oriented to person, place, and time.   Skin: Skin is warm. He is not diaphoretic.   Large full-thickness wounds to lower extremities  Patient colonized with Pseudomonas to left lower extremity     Psychiatric: Mood, affect and judgment normal.                                                         WOUND ASSESSMENT          Wound 06/05/20 Full Thickness Wound Leg Left --Left Lateral LE (Active)   Wound Image    04/21/21 1600   Site Assessment Red;[place] 04/21/21 1600   Periwound Assessment Maceration;Edema;Fragile 04/21/21 1600   Margins Unattached edges 04/21/21 1600   Closure Secondary intention 01/20/21 1300   Drainage Amount Copious 04/21/21 1600   Drainage Description Serosanguineous 04/21/21 1600   Treatments Cleansed;Topical Lidocaine;Provider debridement 04/21/21 1600   Wound Cleansing Foam Cleanser/Washcloth 04/21/21 1600   Periwound Protectant Barrier Paste 03/31/21 1342   Dressing Cleansing/Solutions Not Applicable 03/31/21 1342   Dressing Options Hydrofiber Silver;Absorbent Abdominal Pad;Dry Roll Gauze;Tubigrip 04/21/21 1600   Dressing Changed Changed 02/24/21 1400   Dressing Status Clean;Dry;Intact 02/24/21 1400   Dressing Change/Treatment Frequency Every 48 hrs, and As Needed 01/20/21 1300   Non-staged Wound Description Full thickness 04/21/21 1600   Wound Length (cm) 8.4 cm 04/21/21 1600   Wound Width (cm) 3.5 cm 04/21/21 1600   Wound Depth (cm) 0.2 cm 04/21/21 1600   Wound Surface Area (cm^2) 29.4 cm^2 04/21/21 1600   Wound Volume (cm^3) 5.88 cm^3 04/21/21 1600   Post-Procedure Length (cm) 14 cm 04/21/21 1600   Post-Procedure Width (cm) 3.8 cm 04/21/21 1600   Post-Procedure Depth (cm) 0.2 cm 04/21/21 1600   Post-Procedure Surface Area (cm^2) 53.2 cm^2 04/21/21 1600   Post-Procedure Volume (cm^3) 10.64 cm^3 04/21/21 1600   Wound Healing % 89 04/21/21 1600   Wound Bed Granulation (%) 10 % 02/10/21 1300   Wound Bed Epithelium (%) 90 % 02/10/21 1300   Wound Bed Slough (%) 90  % 03/31/21 1342   Wound Bed Eschar (%) 0 % 01/13/21 1345   Tunneling (cm) 0 cm 04/21/21 1600   Undermining (cm) 0 cm 04/21/21 1600   Wound Odor Foul 04/21/21 1600   Exposed Structures None 04/21/21 1600       Wound 01/20/21 -Left Medial/Posterior/Lateral LE (Active)   Wound Image    04/21/21 1600   Site Assessment Yellow;Red 04/21/21 1600   Periwound Assessment Scar tissue;Maceration;Edema 04/21/21 1600   Margins Unattached edges 04/21/21 1600   Closure Secondary intention 02/24/21 1400   Drainage Amount Copious 04/21/21 1600   Drainage Description Serosanguineous;Yellow;Green 04/21/21 1600   Treatments Cleansed;Topical Lidocaine;Provider debridement 04/21/21 1600   Wound Cleansing Foam Cleanser/Washcloth 04/21/21 1600   Periwound Protectant Barrier Paste 03/31/21 1342   Dressing Cleansing/Solutions Not Applicable 03/31/21 1342   Dressing Options Hydrofiber Silver;Absorbent Abdominal Pad;Dry Roll Gauze;Tubigrip 04/21/21 1600   Dressing Changed Changed 02/24/21 1400   Dressing Status Clean;Dry;Intact 02/24/21 1400   Non-staged Wound Description Full thickness 04/21/21 1600   Wound Length (cm) 14 cm 04/21/21 1600   Wound Width (cm) 10.5 cm 04/21/21 1600   Wound Depth (cm) 0.2 cm 04/21/21 1600   Wound Surface Area (cm^2) 147 cm^2 04/21/21 1600   Wound Volume (cm^3) 29.4 cm^3 04/21/21 1600   Post-Procedure Length (cm) 14 cm 04/21/21 1600   Post-Procedure Width (cm) 11 cm 04/21/21 1600   Post-Procedure Depth (cm) 0.2 cm 04/21/21 1600   Post-Procedure Surface Area (cm^2) 154 cm^2 04/21/21 1600   Post-Procedure Volume (cm^3) 30.8 cm^3 04/21/21 1600   Wound Healing % -2457 04/21/21 1600   Wound Bed Granulation (%) 30 % 02/10/21 1300   Wound Bed Epithelium (%) 0 % 02/10/21 1300   Wound Bed Slough (%) 85 % 03/17/21 1344   Wound Bed Eschar (%) 0 % 02/10/21 1300   Tunneling (cm) 0 cm 04/21/21 1600   Undermining (cm) 0 cm 04/21/21 1600   Wound Odor Foul 04/21/21 1600   Exposed Structures None 04/21/21 1600       Wound 03/31/21  RLE lateral (Active)   Wound Image    04/21/21 1600   Site Assessment Pink;Red;Yellow 04/21/21 1600   Periwound Assessment Maceration;Excoriated 04/21/21 1600   Margins Attached edges 04/21/21 1600   Drainage Amount Large 04/21/21 1600   Drainage Description Serosanguineous 04/21/21 1600   Treatments Cleansed;Topical Lidocaine;Provider debridement 04/21/21 1600   Wound Cleansing Foam Cleanser/Washcloth 04/21/21 1600   Periwound Protectant Barrier Paste 03/31/21 1342   Dressing Options Hydrofiber Silver;Absorbent Abdominal Pad;Dry Roll Gauze;Tubigrip 04/21/21 1600   Dressing Changed Changed 04/21/21 1600   Dressing Status Clean;Dry;Intact 04/21/21 1600   Non-staged Wound Description Full thickness 04/21/21 1600   Wound Length (cm) 1 cm 04/21/21 1600   Wound Width (cm) 1.2 cm 04/21/21 1600   Wound Depth (cm) 0.1 cm 04/21/21 1600   Wound Surface Area (cm^2) 1.2 cm^2 04/21/21 1600   Wound Volume (cm^3) 0.12 cm^3 04/21/21 1600   Post-Procedure Length (cm) 1.1 cm 04/21/21 1600   Post-Procedure Width (cm) 1.2 cm 04/21/21 1600   Post-Procedure Depth (cm) 0.1 cm 04/21/21 1600   Post-Procedure Surface Area (cm^2) 1.32 cm^2 04/21/21 1600   Post-Procedure Volume (cm^3) 0.13 cm^3 04/21/21 1600   Tunneling (cm) 0 cm 04/21/21 1600   Undermining (cm) 0 cm 04/21/21 1600   Wound Odor Foul 04/21/21 1600   Exposed Structures None 04/21/21 1600       Wound 04/11/21 Leg Lower Right (Active)       Wound 04/11/21 Leg Lower Left (Active)       Wound 04/11/21 Heel Right DTI (Active)       Wound 04/21/21 Right Lateral Ankle (Active)   Wound Image    04/21/21 1600   Site Assessment Pink;Red;Slough 04/21/21 1600   Periwound Assessment Red;Maceration 04/21/21 1600   Margins Undefined edges 04/21/21 1600   Closure Secondary intention 04/21/21 1600   Drainage Amount Copious 04/21/21 1600   Drainage Description Yellow;Serosanguineous;Foul purulent 04/21/21 1600   Treatments Cleansed;Topical Lidocaine;Provider debridement 04/21/21 1600   Wound  Cleansing Foam Cleanser/Washcloth 04/21/21 1600   Dressing Options Hydrofiber Silver;Absorbent Abdominal Pad;Dry Roll Gauze;Tubigrip 04/21/21 1600   Dressing Changed Changed 04/21/21 1600   Dressing Status Clean;Dry;Intact 04/21/21 1600   Dressing Change/Treatment Frequency Daily, and As Needed 04/21/21 1600   Non-staged Wound Description Full thickness 04/21/21 1600   Wound Length (cm) 4 cm 04/21/21 1600   Wound Width (cm) 3.5 cm 04/21/21 1600   Wound Depth (cm) 0.1 cm 04/21/21 1600   Wound Surface Area (cm^2) 14 cm^2 04/21/21 1600   Wound Volume (cm^3) 1.4 cm^3 04/21/21 1600   Post-Procedure Length (cm) 4.5 cm 04/21/21 1600   Post-Procedure Width (cm) 4.5 cm 04/21/21 1600   Post-Procedure Depth (cm) 0.1 cm 04/21/21 1600   Post-Procedure Surface Area (cm^2) 20.25 cm^2 04/21/21 1600   Post-Procedure Volume (cm^3) 2.02 cm^3 04/21/21 1600   Tunneling (cm) 0 cm 04/21/21 1600   Undermining (cm) 0 cm 04/21/21 1600   Wound Odor Mild 04/21/21 1600   Exposed Structures None 04/21/21 1600       Wound 04/21/21 Right Plantar Heel (Active)   Wound Image   04/21/21 1600   Site Assessment Brown;Purple 04/21/21 1600   Periwound Assessment Dry;Intact 04/21/21 1600   Drainage Amount None 04/21/21 1600   Treatments Cleansed;Site care 04/21/21 1600   Dressing Options Dry Roll Gauze 04/21/21 1600   Dressing Changed Changed 04/21/21 1600   Dressing Status Clean;Dry;Intact 04/21/21 1600       Wound 04/21/21 Left Medial Ankle (Active)   Wound Image    04/21/21 1600   Site Assessment Red;Yellow 04/21/21 1600   Periwound Assessment Maceration;Edema;Fragile 04/21/21 1600   Margins Unattached edges 04/21/21 1600   Closure Secondary intention 04/21/21 1600   Drainage Amount Copious 04/21/21 1600   Drainage Description Serosanguineous 04/21/21 1600   Treatments Cleansed;Topical Lidocaine;Provider debridement 04/21/21 1600   Dressing Options Hydrofiber Silver;Absorbent Abdominal Pad;Dry Roll Gauze;Tubigrip 04/21/21 1600   Dressing Changed  Changed 21 1600   Dressing Status Clean;Dry;Intact 21 1600   Non-staged Wound Description Full thickness 21 1600   Wound Length (cm) 2 cm 21 1600   Wound Width (cm) 2.5 cm 21 1600   Wound Depth (cm) 0.1 cm 21 1600   Wound Surface Area (cm^2) 5 cm^2 21 1600   Wound Volume (cm^3) 0.5 cm^3 21 1600   Post-Procedure Length (cm) 6.2 cm 21 1600   Post-Procedure Width (cm) 4.1 cm 21 1600   Post-Procedure Depth (cm) 0.2 cm 21 1600   Post-Procedure Surface Area (cm^2) 25.42 cm^2 21 1600   Post-Procedure Volume (cm^3) 5.08 cm^3 21 1600   Tunneling (cm) 0 cm 21 1600   Undermining (cm) 0 cm 21 1600   Wound Odor Foul 21 1600   Exposed Structures None 21 1600          PROCEDURE: Excisional debridement of left lower extremity wounds   -2% viscous lidocaine applied topically to wound beds for approximately 10 minutes prior to debridement  -Curette used to excise thick layer of slough from wound beds.  Excisional debridement was performed to remove devitalized tissue from all wounds until healthy, bleeding tissue was visualized.  Total area debrided approximately (100% accuracy of wound measurement difficult due to the diffuse nature of the wounds) 200 cm² tissue debrided into the subcutaneous layer of all wounds.    -Bleeding controlled with manual pressure.    -Wounds cleansed with normal saline  -Patient tolerated the procedure well, without c/o pain or discomfort.         Pertinent Labs and Diagnostics:    Labs:     A1c:   Lab Results   Component Value Date/Time    HBA1C 5.7 (H) 2020 11:22 AM      Imagin/3/2020-MRI left tibia-fibula with and without contrast  IMPRESSION:     Moderate lateral leg cellulitis and underlying myositis     No abscess or osteomyelitis    VASCULAR STUDIES: BHUPINDER 2019   Right.    Doppler waveform of the common femoral artery is of high amplitude and    triphasic.    Doppler waveforms at the  ankle are brisk and triphasic.    Ankle-brachial index is normal.       Left.    Could not perform ankle-brachial index due to large blisters/ulcers.   Normal toe-brachial index: 0.94   Doppler waveform of the common femoral artery is of high amplitude and    triphasic.    Biphasic waveforms seen at the ankle.     WOUND CULTURE:    11/18/2020: Culture collected in clinic  Culture Result Abnormal   Staphylococcus aureus   Light growth     Culture Result Abnormal   Pseudomonas aeruginosa   Light growth     Culture Result Abnormal   Enterococcus faecalis   Light growth              ASSESSMENT AND PLAN:     1. Skin ulcer of left lower leg with fat layer exposed (HCC)  Comment: Patient has history of recurring ulcerations to left lower extremity.  Has undergone ablation procedures by Dr. White.  Discharged from [facility] in March 2020 due to healing, returned approximately 3 weeks later much worse.  Hospitalized from 4/16 until 5/21/2020 underwent multiple surgical debridements.    04/21/21: Patient's wounds have completely stalled, Pseudomonas still has heavy growth.    -Excisional debridement of wounds in clinic today, medically necessary to promote wound healing.  Patient's tissue has turned very fibrous in nature has poor vascular flow.  -Patient underwent bilateral lower extremity arterial ultrasound left superficial femoral, profunda femoral, popliteal and tibial artery stenosis 50 to 75%.  No stenosis to the right leg.  There is no plan for arterial intervention at this time per Dr. Rhodes.    -Patient to return to clinic weekly for assessment and debridement  -Home health to change dressing 2- 3 times per week in between clinic visits until we can establish a dry periwound environment without overwhelming Pseudomonas.          Wound care: Hydrofiber silver, absorbent abdominal pad, dry roll gauze, Tubigrip E x2.  Patient is to shower daily with soap and water and reapply dressings daily.  If patient runs out of  Hydrofiber silver patient may use gauze or roll gauze as long as he is showering with soap and water daily drying and redressing the wounds.    2. Chronic venous stasis  Comments: Hemosiderin staining, brawny edema, scarring- findings consistent with CVI.  Pt established at [facility], underwent ablation procedures in 2019.     -Patient was seen by Dr. Rhodes at [facility] no further plans for arterial intervention at this time.     3. Acquired deformity of left ankle and foot  Comments: Charcot deformity of foot and ankle.  Very little ROM of ankle.  Patient was referred to orthopedics previously, however did not make appointment.  -Patient will need to follow-up with orthopedics once wounds are healed.    4.  Wound infection     04/21/21: Comments: Patient is colonized with Pseudomonas.  Drainage is quite heavy leading to degradation of wounds and increased pseudomonal slough.  -Patient to shower daily with soap and water dry wound beds thoroughly and reapply dressings daily.  -Increase home health to 4 times a week for additional dressing changes 1 time a week in clinic and his doctor to change on the weekend.    5.  Pain associated with wound    04/21/21:  -2% viscous lidocaine applied topically to wound bed for approximately 5 minutes prior to debridement  -Patient tolerated procedure today with no complaints of discomfort.    > 20 min spent face to face with patient, >50% of time spent counseling, coordinating care, reviewing records, discussing POC, educating patient.  Time spent in excess of procedural time.      Please note that this note may have been created using voice recognition software. I have worked with technical experts from [facility] to optimize the interface.  I have made every reasonable attempt to correct obvious errors, but there may be errors of grammar and possibly content that I did not discover before finalizing the note.    N

## 2021-04-22 NOTE — NON-PROVIDER
Called [facility] and spoke with Braydon regarding 4 day a week visits and providing dressing supplies for the 2 days a week Pt will be doing own dressing changes.  He stated they could accommodate that and will pass it on to his Nurse.

## 2021-04-22 NOTE — PROGRESS NOTES
DANE Bess made 3x attempts to follow up with Goran via [facility]. No return calls. Goran will be d/c from [facility] services due to loss of contact.

## 2021-04-22 NOTE — NON-PROVIDER
Patient instructed to Change dressing to bilateral legs daily.  Home health to see patient 4 days per week then change dressing himself on weekends.    Wound supply order placed with [facility].

## 2021-04-28 ENCOUNTER — OFFICE VISIT (OUTPATIENT)
Dept: WOUND CARE | Facility: MEDICAL CENTER | Age: 66
End: 2021-04-28
Attending: NURSE PRACTITIONER
Payer: MEDICARE

## 2021-04-28 VITALS
TEMPERATURE: 97.5 F | DIASTOLIC BLOOD PRESSURE: 94 MMHG | OXYGEN SATURATION: 98 % | SYSTOLIC BLOOD PRESSURE: 143 MMHG | HEART RATE: 66 BPM | RESPIRATION RATE: 20 BRPM

## 2021-04-28 DIAGNOSIS — M21.962 ACQUIRED DEFORMITY OF LEFT ANKLE AND FOOT: ICD-10-CM

## 2021-04-28 DIAGNOSIS — R52 PAIN ASSOCIATED WITH WOUND: ICD-10-CM

## 2021-04-28 DIAGNOSIS — Z91.199 NONCOMPLIANCE: ICD-10-CM

## 2021-04-28 DIAGNOSIS — L08.9 WOUND INFECTION: ICD-10-CM

## 2021-04-28 DIAGNOSIS — T14.8XXA WOUND INFECTION: ICD-10-CM

## 2021-04-28 DIAGNOSIS — I87.8 CHRONIC VENOUS STASIS: ICD-10-CM

## 2021-04-28 DIAGNOSIS — L97.922 SKIN ULCER OF LEFT LOWER LEG WITH FAT LAYER EXPOSED (HCC): Primary | ICD-10-CM

## 2021-04-28 DIAGNOSIS — T14.8XXA PAIN ASSOCIATED WITH WOUND: ICD-10-CM

## 2021-04-28 PROCEDURE — 99213 OFFICE O/P EST LOW 20 MIN: CPT | Mod: 25 | Performed by: NURSE PRACTITIONER

## 2021-04-28 PROCEDURE — 99213 OFFICE O/P EST LOW 20 MIN: CPT | Mod: 25

## 2021-04-28 PROCEDURE — 11045 DBRDMT SUBQ TISS EACH ADDL: CPT | Performed by: NURSE PRACTITIONER

## 2021-04-28 PROCEDURE — 11042 DBRDMT SUBQ TIS 1ST 20SQCM/<: CPT

## 2021-04-28 PROCEDURE — 11045 DBRDMT SUBQ TISS EACH ADDL: CPT

## 2021-04-28 PROCEDURE — 11042 DBRDMT SUBQ TIS 1ST 20SQCM/<: CPT | Performed by: NURSE PRACTITIONER

## 2021-04-28 RX ORDER — CIPROFLOXACIN 500 MG/1
500 TABLET, FILM COATED ORAL 2 TIMES DAILY
Qty: 14 TABLET | Refills: 0 | Status: SHIPPED | OUTPATIENT
Start: 2021-04-28 | End: 2021-05-05

## 2021-04-28 ASSESSMENT — ENCOUNTER SYMPTOMS
VOMITING: 0
PALPITATIONS: 0
DEPRESSION: 1
SHORTNESS OF BREATH: 0
COUGH: 0
FEVER: 0
HEADACHES: 0
DIZZINESS: 0
CLAUDICATION: 0
MYALGIAS: 0
WHEEZING: 0
WEAKNESS: 0
NAUSEA: 0
CHILLS: 0

## 2021-04-28 ASSESSMENT — PAIN SCALES - GENERAL: PAINLEVEL: NO PAIN

## 2021-04-28 NOTE — NON-PROVIDER
Called [facility] and spoke with Lakeisha ROGERS regarding Home Health orders.  She stated they have gone out 4 days a week but Pt does not always answer the door or is still sleeping when they arrive.  There policy is to wait half an hour and if Pt is not ready they must leave.  She said they have provided him with home supplies but he does not use them.    She said his living conditions are very poor and that they must do wound care outside on a table.    She said they are in contact with his daughter Megan and that she is unable to get him to follow wound care directions either.

## 2021-04-28 NOTE — PROGRESS NOTES
Provider Encounter- Full Thickness wound    HISTORY OF PRESENT ILLNESS  Wound History:    START OF CARE IN CLINIC: 5/20/2020               REFERRING PROVIDER: KEV Keith                 WOUND ETIOLOGY: venous,  likely mixed etiology              LOCATION: Left lower leg, multiple wounds                                   Right lower leg, multiple wounds-first observed in clinic on initial evaluation, 6/5/2020.  Resolved 9/2/2020                HISTORY: Patient is very well-known to this clinic from previous treatment of wounds to his left lower extremity.  He was discharged from the clinic in March 2020 due to full resolution of his wound.  He returned to the clinic approximately 3 weeks later, on 4/16, with cellulitis, edema, and large open wounds to his left lower leg, and was sent directly to [facility] emergency room.  He was admitted for IV antibiotics and surgical intervention.  On 4/22/2020, he underwent an I&D of his left lower extremity, fasciotomy, and VAC placement.  In the following weeks he underwent surgery 3 more times for irrigation and debridement.  He was discharged home on 5/21, on the VAC, with home health and a referral to [facility].      4/21/2021: Patient was recently discharged from [facility] following being sent from the wound care clinic.  There the patient received IV antibiotics and underwent arterial imaging.  Patient returns to [facility] for care of bilateral lower extremity wounds.      Pertinent Medical History:  Anxiety, Charcot's joint of left foot, non-diabetic (3/21/2016), Chronic congestive heart failure (HCC) (11/16/2017), Hepatitis C, chronic ([facility]), Hypertension, Migraine, Polysubstance abuse ([facility]) (3/8/2018), Tobacco use (4/18/2016), Ulcer of left lower extremity with necrosis of muscle ([facility]) (3/21/2016), and Venous stasis ulcer ([facility]) (2017).                ETIOLOGY HISTORY:  Vascular Surgeon: Dr. White. Compression Circ-aid. Varicose Veins none  visible     TOBACCO USE: Patient denies; patient also denies alcohol and recreational drug use    Patient's problem list, allergies, and current medications reviewed and updated in Epic    Interval History:  Interval History thinned 2/10/2021.  Please see previous notes for complete interval history.       4/21/2021: Clinic visit with KEV Luna. Patient states that they are feeling well today.  Patient denies fever, chills, nausea, vomiting, lightheadedness, dizziness, shortness of breath and chest pain.     4/28/2021: Clinic visit with KEV Luna. Patient states that they are feeling well today.  Patient denies fever, chills, nausea, vomiting, lightheadedness, dizziness, shortness of breath and chest pain.  Patient reports that home health came on Friday and Saturday of last week and not the 4 times as ordered.  Patient also reports that he took a shower every day as instructed.  Later in the conversation the patient stated that he only took a shower on the days that home health came which is not every day.  We also called home health who went to the patient's home all 4 times as ordered however the patient did not answer the door.  Home health left bandages for patient to dress his wound.  Patient reports no bandages were left.  Patient is noncompliant with plan of care and noncompliant with home health assistance.  Patient's wounds are filled with Pseudomonas new wounds to the right lower extremity.    REVIEW OF SYSTEMS:   Review of Systems   Constitutional: Negative for chills and fever.        States he is eating well, appetite normal   Respiratory: Negative for cough, shortness of breath and wheezing.    Cardiovascular: Positive for leg swelling. Negative for chest pain, palpitations and claudication.        Chronic swelling in legs, for several years   Gastrointestinal: Negative for nausea and vomiting.   Musculoskeletal: Positive for joint pain. Negative for myalgias.        Chronic    Skin:        Multiple diffuse open wounds to left lower extremity a few new open wounds to right lower extremity.  Pseudomonal drainage to bilateral lower extremities.    Neurological: Negative for dizziness, weakness and headaches.   Psychiatric/Behavioral: Positive for depression.       PHYSICAL EXAMINATION:   /94   Pulse 66   Temp 36.4 °C (97.5 °F) (Temporal)   Resp 20   SpO2 98%     Physical Exam   Constitutional: He is oriented to person, place, and time and well-developed, well-nourished, and in no distress. No distress.   HENT:   Head: Normocephalic and atraumatic.   Eyes: Pupils are equal, round, and reactive to light. Conjunctivae and EOM are normal.   Cardiovascular: Intact distal pulses.   Pulmonary/Chest: Effort normal. No respiratory distress. He has no wheezes.   Musculoskeletal:         General: Deformity present. No edema. Normal range of motion.      Cervical back: Neck supple.      Comments: Left charcot foot and ankle     Neurological: He is alert and oriented to person, place, and time.   Skin: Skin is warm. He is not diaphoretic.   Large full-thickness wounds to lower extremities  Patient colonized with Pseudomonas to bilateral lower extremities.    Psychiatric: Mood, affect and judgment normal.                                                         WOUND ASSESSMENT           Wound 06/05/20 Full Thickness Wound Leg Left --Left Lateral LE (Active)   Wound Image    04/28/21 1400   Site Assessment Red;Lyons Switch 04/28/21 1400   Periwound Assessment Maceration;Edema;Fragile 04/28/21 1400   Margins Unattached edges 04/28/21 1400   Closure Secondary intention 01/20/21 1300   Drainage Amount Copious 04/28/21 1400   Drainage Description Serosanguineous 04/28/21 1400   Treatments Cleansed;Topical Lidocaine;Provider debridement 04/28/21 1400   Wound Cleansing Foam Cleanser/Washcloth 04/28/21 1400   Periwound Protectant Barrier Paste 03/31/21 1342   Dressing Cleansing/Solutions Not Applicable  03/31/21 1342   Dressing Options Hydrofiber Silver;Absorbent Abdominal Pad;Dry Roll Gauze;Tubigrip 04/28/21 1400   Dressing Changed Changed 04/28/21 1400   Dressing Status Clean;Dry;Intact 04/28/21 1400   Dressing Change/Treatment Frequency Every 48 hrs, and As Needed 01/20/21 1300   Non-staged Wound Description Full thickness 04/28/21 1400   Wound Length (cm) 17 cm 04/28/21 1400   Wound Width (cm) 4.6 cm 04/28/21 1400   Wound Depth (cm) 0.2 cm 04/28/21 1400   Wound Surface Area (cm^2) 78.2 cm^2 04/28/21 1400   Wound Volume (cm^3) 15.64 cm^3 04/28/21 1400   Post-Procedure Length (cm) 17 cm 04/28/21 1400   Post-Procedure Width (cm) 4.7 cm 04/28/21 1400   Post-Procedure Depth (cm) 0.2 cm 04/28/21 1400   Post-Procedure Surface Area (cm^2) 79.9 cm^2 04/28/21 1400   Post-Procedure Volume (cm^3) 15.98 cm^3 04/28/21 1400   Wound Healing % 71 04/28/21 1400   Wound Bed Granulation (%) 10 % 02/10/21 1300   Wound Bed Epithelium (%) 90 % 02/10/21 1300   Wound Bed Slough (%) 90 % 03/31/21 1342   Wound Bed Eschar (%) 0 % 01/13/21 1345   Tunneling (cm) 0 cm 04/28/21 1400   Undermining (cm) 0 cm 04/28/21 1400   Wound Odor Strong;Foul 04/28/21 1400   Exposed Structures None 04/28/21 1400       Wound 01/20/21 -Left Medial/Posterior/Lateral LE (Active)   Wound Image    04/28/21 1400   Site Assessment Yellow;Red;Excoriated 04/28/21 1400   Periwound Assessment Scar tissue;Maceration;Edema 04/28/21 1400   Margins Unattached edges 04/28/21 1400   Closure Secondary intention 02/24/21 1400   Drainage Amount Copious 04/28/21 1400   Drainage Description Green;Yellow;Purulent 04/28/21 1400   Treatments Cleansed;Topical Lidocaine;Provider debridement 04/28/21 1400   Wound Cleansing Foam Cleanser/Washcloth 04/28/21 1400   Periwound Protectant Barrier Paste 03/31/21 1342   Dressing Cleansing/Solutions Not Applicable 03/31/21 1342   Dressing Options Hydrofiber Silver;Absorbent Abdominal Pad;Dry Roll Gauze;Tubigrip 04/28/21 1400   Dressing Changed  Changed 02/24/21 1400   Dressing Status Clean;Dry;Intact 02/24/21 1400   Non-staged Wound Description Full thickness 04/28/21 1400   Wound Length (cm) 6.5 cm 04/28/21 1400   Wound Width (cm) 12 cm 04/28/21 1400   Wound Depth (cm) 0.2 cm 04/28/21 1400   Wound Surface Area (cm^2) 78 cm^2 04/28/21 1400   Wound Volume (cm^3) 15.6 cm^3 04/28/21 1400   Post-Procedure Length (cm) 6.7 cm 04/28/21 1400   Post-Procedure Width (cm) 12.5 cm 04/28/21 1400   Post-Procedure Depth (cm) 0.2 cm 04/28/21 1400   Post-Procedure Surface Area (cm^2) 83.75 cm^2 04/28/21 1400   Post-Procedure Volume (cm^3) 16.75 cm^3 04/28/21 1400   Wound Healing % -1257 04/28/21 1400   Wound Bed Granulation (%) 30 % 02/10/21 1300   Wound Bed Epithelium (%) 0 % 02/10/21 1300   Wound Bed Slough (%) 85 % 03/17/21 1344   Wound Bed Eschar (%) 0 % 02/10/21 1300   Tunneling (cm) 0 cm 04/28/21 1400   Undermining (cm) 0 cm 04/28/21 1400   Wound Odor Foul;Strong 04/28/21 1400   Exposed Structures None 04/28/21 1400       Wound 03/31/21 RLE lateral (Active)   Wound Image    04/28/21 1400   Site Assessment Pink;Red;Yellow 04/28/21 1400   Periwound Assessment Maceration;Excoriated 04/28/21 1400   Margins Attached edges 04/28/21 1400   Drainage Amount Large 04/28/21 1400   Drainage Description Serosanguineous;Yellow 04/28/21 1400   Treatments Cleansed;Topical Lidocaine;Provider debridement 04/28/21 1400   Wound Cleansing Foam Cleanser/Washcloth 04/28/21 1400   Periwound Protectant Barrier Paste 03/31/21 1342   Dressing Options Hydrofiber Silver;Absorbent Abdominal Pad;Dry Roll Gauze;Tubigrip 04/28/21 1400   Dressing Changed Changed 04/28/21 1400   Dressing Status Clean;Dry;Intact 04/28/21 1400   Non-staged Wound Description Full thickness 04/28/21 1400   Wound Length (cm) 1.1 cm 04/28/21 1400   Wound Width (cm) 1.1 cm 04/28/21 1400   Wound Depth (cm) 0.2 cm 04/28/21 1400   Wound Surface Area (cm^2) 1.21 cm^2 04/28/21 1400   Wound Volume (cm^3) 0.24 cm^3 04/28/21 1400    Post-Procedure Length (cm) 1.1 cm 04/28/21 1400   Post-Procedure Width (cm) 1.2 cm 04/28/21 1400   Post-Procedure Depth (cm) 0.2 cm 04/28/21 1400   Post-Procedure Surface Area (cm^2) 1.32 cm^2 04/28/21 1400   Post-Procedure Volume (cm^3) 0.26 cm^3 04/28/21 1400   Wound Healing % -100 04/28/21 1400   Tunneling (cm) 0 cm 04/28/21 1400   Undermining (cm) 0 cm 04/28/21 1400   Wound Odor Foul 04/28/21 1400   Exposed Structures None 04/28/21 1400       Wound 04/11/21 Leg Lower Right (Active)       Wound 04/11/21 Leg Lower Left (Active)       Wound 04/11/21 Heel Right DTI (Active)       Wound 04/21/21 Right Lateral Ankle (Active)   Wound Image    04/28/21 1400   Site Assessment Pink;Red;Slough 04/28/21 1400   Periwound Assessment Red;Maceration 04/28/21 1400   Margins Undefined edges 04/28/21 1400   Closure Secondary intention 04/21/21 1600   Drainage Amount Copious 04/28/21 1400   Drainage Description Serosanguineous;Yellow 04/28/21 1400   Treatments Cleansed;Topical Lidocaine;Provider debridement 04/28/21 1400   Wound Cleansing Foam Cleanser/Washcloth 04/28/21 1400   Periwound Protectant Not Applicable 04/28/21 1400   Dressing Options Hydrofiber Silver;Absorbent Abdominal Pad;Dry Roll Gauze;Tubigrip 04/28/21 1400   Dressing Changed Changed 04/28/21 1400   Dressing Status Clean;Dry;Intact 04/28/21 1400   Dressing Change/Treatment Frequency Daily, and As Needed 04/28/21 1400   Non-staged Wound Description Full thickness 04/28/21 1400   Wound Length (cm) 4.6 cm 04/28/21 1400   Wound Width (cm) 4.4 cm 04/28/21 1400   Wound Depth (cm) 0.2 cm 04/28/21 1400   Wound Surface Area (cm^2) 20.24 cm^2 04/28/21 1400   Wound Volume (cm^3) 4.05 cm^3 04/28/21 1400   Post-Procedure Length (cm) 4.7 cm 04/28/21 1400   Post-Procedure Width (cm) 4.4 cm 04/28/21 1400   Post-Procedure Depth (cm) 0.2 cm 04/28/21 1400   Post-Procedure Surface Area (cm^2) 20.68 cm^2 04/28/21 1400   Post-Procedure Volume (cm^3) 4.14 cm^3 04/28/21 1400   Wound  Healing % -189 04/28/21 1400   Tunneling (cm) 0 cm 04/28/21 1400   Undermining (cm) 0 cm 04/28/21 1400   Wound Odor Strong;Foul 04/28/21 1400   Exposed Structures None 04/28/21 1400       Wound 04/21/21 Left Medial Ankle (Active)   Wound Image    04/21/21 1600   Site Assessment Red;Yellow 04/21/21 1600   Periwound Assessment Maceration;Edema;Fragile 04/21/21 1600   Margins Unattached edges 04/21/21 1600   Closure Secondary intention 04/21/21 1600   Drainage Amount Copious 04/21/21 1600   Drainage Description Serosanguineous 04/21/21 1600   Treatments Cleansed;Topical Lidocaine;Provider debridement 04/21/21 1600   Dressing Options Hydrofiber Silver;Absorbent Abdominal Pad;Dry Roll Gauze;Tubigrip 04/21/21 1600   Dressing Changed Changed 04/21/21 1600   Dressing Status Clean;Dry;Intact 04/21/21 1600   Non-staged Wound Description Full thickness 04/21/21 1600   Wound Length (cm) 2 cm 04/21/21 1600   Wound Width (cm) 2.5 cm 04/21/21 1600   Wound Depth (cm) 0.1 cm 04/21/21 1600   Wound Surface Area (cm^2) 5 cm^2 04/21/21 1600   Wound Volume (cm^3) 0.5 cm^3 04/21/21 1600   Post-Procedure Length (cm) 6.2 cm 04/21/21 1600   Post-Procedure Width (cm) 4.1 cm 04/21/21 1600   Post-Procedure Depth (cm) 0.2 cm 04/21/21 1600   Post-Procedure Surface Area (cm^2) 25.42 cm^2 04/21/21 1600   Post-Procedure Volume (cm^3) 5.08 cm^3 04/21/21 1600   Tunneling (cm) 0 cm 04/21/21 1600   Undermining (cm) 0 cm 04/21/21 1600   Wound Odor Foul 04/21/21 1600   Exposed Structures None 04/21/21 1600       Wound 04/28/21 Right Medial Ankle/LE  (Active)   Wound Image    04/28/21 1400   Site Assessment Red;Bleeding 04/28/21 1400   Periwound Assessment Excoriated;Red 04/28/21 1400   Margins Attached edges 04/28/21 1400   Drainage Amount Moderate 04/28/21 1400   Drainage Description Serosanguineous 04/28/21 1400   Treatments Cleansed;Topical Lidocaine;Provider debridement 04/28/21 1400   Wound Cleansing Foam Cleanser/Washcloth 04/28/21 1400   Dressing  Cleansing/Solutions Other (Comments) 21 1400   Dressing Options Hydrofiber Silver;Absorbent Abdominal Pad;Dry Roll Gauze;Tubigrip 21 1400   Dressing Changed Changed 21 1400   Dressing Status Clean;Dry;Intact 21 1400   Non-staged Wound Description Full thickness 21 1400   Wound Length (cm) 2.6 cm 21 1400   Wound Width (cm) 2.2 cm 21 1400   Wound Depth (cm) 0.1 cm 21 1400   Wound Surface Area (cm^2) 5.72 cm^2 21 1400   Wound Volume (cm^3) 0.57 cm^3 21 1400   Post-Procedure Length (cm) 2.6 cm 21 1400   Post-Procedure Width (cm) 2.3 cm 21 1400   Post-Procedure Depth (cm) 0.1 cm 21 1400   Post-Procedure Surface Area (cm^2) 5.98 cm^2 21 1400   Post-Procedure Volume (cm^3) 0.6 cm^3 21 1400   Tunneling (cm) 0 cm 21 1400   Undermining (cm) 0 cm 21 1400   Wound Odor Mild 21 1400   Exposed Structures None 21 1400         PROCEDURE: Excisional debridement of left lower extremity wounds   -2% viscous lidocaine applied topically to wound beds for approximately 10 minutes prior to debridement  -Curette used to excise thick layer of slough from wound beds.  Excisional debridement was performed to remove devitalized tissue from all wounds until healthy, bleeding tissue was visualized.  Total area debrided approximately (100% accuracy of wound measurement difficult due to the diffuse nature of the wounds) 200 cm² tissue debrided into the subcutaneous layer of all wounds.    -Bleeding controlled with manual pressure.    -Wounds cleansed with normal saline  -Patient tolerated the procedure well, without c/o pain or discomfort.         Pertinent Labs and Diagnostics:    Labs:     A1c:   Lab Results   Component Value Date/Time    HBA1C 5.7 (H) 2020 11:22 AM      Imagin/3/2020-MRI left tibia-fibula with and without contrast  IMPRESSION:     Moderate lateral leg cellulitis and underlying myositis     No abscess or  osteomyelitis    VASCULAR STUDIES: BHUPINDER 12/27/2019   Right.    Doppler waveform of the common femoral artery is of high amplitude and    triphasic.    Doppler waveforms at the ankle are brisk and triphasic.    Ankle-brachial index is normal.       Left.    Could not perform ankle-brachial index due to large blisters/ulcers.   Normal toe-brachial index: 0.94   Doppler waveform of the common femoral artery is of high amplitude and    triphasic.    Biphasic waveforms seen at the ankle.     WOUND CULTURE:    11/18/2020: Culture collected in clinic  Culture Result Abnormal   Staphylococcus aureus   Light growth     Culture Result Abnormal   Pseudomonas aeruginosa   Light growth     Culture Result Abnormal   Enterococcus faecalis   Light growth              ASSESSMENT AND PLAN:     1. Skin ulcer of left lower leg with fat layer exposed (HCC)  Comment: Patient has history of recurring ulcerations to left lower extremity.  Has undergone ablation procedures by Dr. White.  Discharged from [facility] in March 2020 due to healing, returned approximately 3 weeks later much worse.  Hospitalized from 4/16 until 5/21/2020 underwent multiple surgical debridements.    04/28/21: Patient's wounds have completely stalled, Pseudomonas still has heavy growth.    -Excisional debridement of wounds in clinic today, medically necessary to promote wound healing.  Patient's tissue has turned very fibrous in nature has poor vascular flow.  -Patient underwent bilateral lower extremity arterial ultrasound left superficial femoral, profunda femoral, popliteal and tibial artery stenosis 50 to 75%.  No stenosis to the right leg.  There is no plan for arterial intervention at this time per Dr. Rhodes.    -Patient to return to clinic weekly for assessment and debridement  -Home health to change dressing 4 times per week in between clinic visits.  Patient's daughter is to change dressing Saturday and Sunday.  We will change the patient's dressing while in  clinic 1 time a week.  This will cover the patient for a dressing change every day.  Patient was also advised at minimum if he has severe drainage to remove the dressing and apply roll gauze as needed for excessive drainage.  Goal is to decrease Pseudomonas to try to heal the open wounds.        Wound care: Hydrofiber silver, absorbent abdominal pad, dry roll gauze, Tubigrip E    2. Chronic venous stasis  Comments: Hemosiderin staining, brawny edema, scarring- findings consistent with CVI.  Pt established at [facility], underwent ablation procedures in 2019.     -Patient was seen by Dr. Rhodes at [facility] no further plans for arterial intervention at this time.     3. Acquired deformity of left ankle and foot  Comments: Charcot deformity of foot and ankle.  Very little ROM of ankle.  Patient was referred to orthopedics previously, however did not make appointment.  -Patient will need to follow-up with orthopedics once wounds are healed.    4.  Wound infection     04/28/21: Comments: Patient is colonized with Pseudomonas.  Drainage is quite heavy leading to degradation of wounds and increased pseudomonal slough.  -Patient now has Pseudomonas to right lower extremity as well.  -Patient to shower daily with soap and water dry wound beds thoroughly and reapply dressings daily.  -Increase home health to 4 times a week for additional dressing changes 1 time a week in clinic and his daughter to change on the weekend.    5.  Pain associated with wound    04/28/21:  -2% viscous lidocaine applied topically to wound bed for approximately 5 minutes prior to debridement  -Patient tolerated procedure today with no complaints of discomfort.    6.  Noncompliance  Comments: Patient reports that he only has showered twice last week.  Plan of care was for the patient to shower daily with soap and water to help remove Pseudomonas from bilateral lower extremities then to dry legs thoroughly and reapply dressings as ordered by  [facility].  -Patient's home health reports that they attempted to got to the patient's home 4 times last week however the patient did not answer the door on multiple occasions.  Home health states that they left dressings for the patient outside of his door.  Patient states that he had no dressings to change his old saturated dressings with.  Patient has been noncompliant on multiple fronts, previously patient was noncompliant with antibiotic treatment as prescribed by infectious disease.    > 20 min spent face to face with patient, >50% of time spent counseling, coordinating care, reviewing records, discussing POC, educating patient.  Time spent in addition to procedural time.      Please note that this note may have been created using voice recognition software. I have worked with technical experts from [employer] to optimize the interface.  I have made every reasonable attempt to correct obvious errors, but there may be errors of grammar and possibly content that I did not discover before finalizing the note.    N

## 2021-05-05 ENCOUNTER — OFFICE VISIT (OUTPATIENT)
Dept: WOUND CARE | Facility: MEDICAL CENTER | Age: 66
End: 2021-05-05
Attending: NURSE PRACTITIONER
Payer: MEDICARE

## 2021-05-05 VITALS
TEMPERATURE: 97.7 F | DIASTOLIC BLOOD PRESSURE: 88 MMHG | OXYGEN SATURATION: 95 % | RESPIRATION RATE: 18 BRPM | HEART RATE: 99 BPM | SYSTOLIC BLOOD PRESSURE: 140 MMHG

## 2021-05-05 DIAGNOSIS — L97.929 ULCERS OF BOTH LOWER EXTREMITIES, UNSPECIFIED ULCER STAGE (HCC): ICD-10-CM

## 2021-05-05 DIAGNOSIS — L97.919 ULCERS OF BOTH LOWER EXTREMITIES, UNSPECIFIED ULCER STAGE (HCC): ICD-10-CM

## 2021-05-05 PROCEDURE — 11042 DBRDMT SUBQ TIS 1ST 20SQCM/<: CPT

## 2021-05-05 PROCEDURE — 11045 DBRDMT SUBQ TISS EACH ADDL: CPT | Performed by: NURSE PRACTITIONER

## 2021-05-05 PROCEDURE — 99213 OFFICE O/P EST LOW 20 MIN: CPT | Mod: 25 | Performed by: NURSE PRACTITIONER

## 2021-05-05 PROCEDURE — 11042 DBRDMT SUBQ TIS 1ST 20SQCM/<: CPT | Performed by: NURSE PRACTITIONER

## 2021-05-05 PROCEDURE — 11045 DBRDMT SUBQ TISS EACH ADDL: CPT

## 2021-05-05 PROCEDURE — 99213 OFFICE O/P EST LOW 20 MIN: CPT | Mod: 25

## 2021-05-05 ASSESSMENT — ENCOUNTER SYMPTOMS
PALPITATIONS: 0
HEADACHES: 0
WHEEZING: 0
VOMITING: 0
CHILLS: 0
CLAUDICATION: 0
MYALGIAS: 0
WEAKNESS: 0
COUGH: 0
DIZZINESS: 0
FEVER: 0
SHORTNESS OF BREATH: 0
NAUSEA: 0
DEPRESSION: 1

## 2021-05-05 NOTE — PATIENT INSTRUCTIONS
-Keep dressings clean and dry. Change dressings everyday, and if they become saturated, soiled, or fall off.    -Shower daily, clean legs with mild/unscented soap and water.    -Avoid prolonged standing or sitting without elevating your legs.    -Remove your compression garments if you have severe pain, severe swelling, numbness, color change, or temperature change in your toes. If you need to remove your compression garments, do so by unrolling them. Do not cut the compression garments off, this is to prevent cutting yourself on accident.    -Should you experience any significant changes in your wounds, such as signs of infection (redness, swelling, localized heat, increased pain, fever > 101 F, chills) or have any questions regarding your home care instructions, please contact the wound center at [phone]. If after hours, contact your primary care physician or go to the hospital emergency room.

## 2021-05-05 NOTE — PROGRESS NOTES
Provider Encounter- Full Thickness wound    HISTORY OF PRESENT ILLNESS  Wound History:    START OF CARE IN CLINIC: 5/20/2020               REFERRING PROVIDER: KEV Keith                 WOUND ETIOLOGY: venous,  likely mixed etiology              LOCATION: Left lower leg, multiple wounds                                   Right lower leg, multiple wounds-first observed in clinic on initial evaluation, 6/5/2020.  Resolved 9/2/2020                HISTORY: Patient is very well-known to this clinic from previous treatment of wounds to his left lower extremity.  He was discharged from the clinic in March 2020 due to full resolution of his wound.  He returned to the clinic approximately 3 weeks later, on 4/16, with cellulitis, edema, and large open wounds to his left lower leg, and was sent directly to [facility] emergency room.  He was admitted for IV antibiotics and surgical intervention.  On 4/22/2020, he underwent an I&D of his left lower extremity, fasciotomy, and VAC placement.  In the following weeks he underwent surgery 3 more times for irrigation and debridement.  He was discharged home on 5/21, on the VAC, with home health and a referral to [facility].      4/21/2021: Patient was recently discharged from [facility] following being sent from the wound care clinic.  There the patient received IV antibiotics and underwent arterial imaging.  Patient returns to [facility] for care of bilateral lower extremity wounds.      Pertinent Medical History:  Anxiety, Charcot's joint of left foot, non-diabetic (3/21/2016), Chronic congestive heart failure (HCC) (11/16/2017), Hepatitis C, chronic ([facility]), Hypertension, Migraine, Polysubstance abuse ([facility]) (3/8/2018), Tobacco use (4/18/2016), Ulcer of left lower extremity with necrosis of muscle ([facility]) (3/21/2016), and Venous stasis ulcer ([facility]) (2017).                ETIOLOGY HISTORY:  Vascular Surgeon: Dr. White. Compression Circ-aid. Varicose Veins none  visible     TOBACCO USE: Patient denies; patient also denies alcohol and recreational drug use    Patient's problem list, allergies, and current medications reviewed and updated in Epic    Interval History:  Interval History thinned 2/10/2021.  Please see previous notes for complete interval history.       4/21/2021: Clinic visit with KEV Luna. Patient states that they are feeling well today.  Patient denies fever, chills, nausea, vomiting, lightheadedness, dizziness, shortness of breath and chest pain.     4/28/2021: Clinic visit with KEV Luna. Patient states that they are feeling well today.  Patient denies fever, chills, nausea, vomiting, lightheadedness, dizziness, shortness of breath and chest pain.  Patient reports that home health came on Friday and Saturday of last week and not the 4 times as ordered.  Patient also reports that he took a shower every day as instructed.  Later in the conversation the patient stated that he only took a shower on the days that home health came which is not every day.  We also called home health who went to the patient's home all 4 times as ordered however the patient did not answer the door.  Home health left bandages for patient to dress his wound.  Patient reports no bandages were left.  Patient is noncompliant with plan of care and noncompliant with home health assistance.  Patient's wounds are filled with Pseudomonas new wounds to the right lower extremity.    5/5/2021 : Clinic visit with KEV Brantley, RYAN, DONNIEN, CAIN.  Kodi states he is feeling well overall, denies fevers, chills, nausea, vomiting, cough or shortness of breath.  He has been taking his Cipro as prescribed, states he has 2 more pills left, will be completing these tomorrow morning.  Home health continues to see him daily during the week for dressing changes.  His daughter is changing his dressing on the weekends.  Is also been showering daily washing his legs.    REVIEW  OF SYSTEMS:   Review of Systems   Constitutional: Negative for chills and fever.        States he is eating well, appetite normal   Respiratory: Negative for cough, shortness of breath and wheezing.    Cardiovascular: Positive for leg swelling. Negative for chest pain, palpitations and claudication.        Chronic swelling in legs, for several years   Gastrointestinal: Negative for nausea and vomiting.   Musculoskeletal: Positive for joint pain. Negative for myalgias.        Chronic   Skin:        Multiple diffuse open wounds to left lower extremity a few new open wounds to right lower extremity.  Pseudomonal drainage to bilateral lower extremities.    Neurological: Negative for dizziness, weakness and headaches.   Psychiatric/Behavioral: Positive for depression.       PHYSICAL EXAMINATION:   /88   Pulse 99   Temp 36.5 °C (97.7 °F) (Temporal)   Resp 18   SpO2 95%     Physical Exam   Constitutional: He is oriented to person, place, and time and well-developed, well-nourished, and in no distress. No distress.   HENT:   Head: Normocephalic and atraumatic.   Eyes: Pupils are equal, round, and reactive to light. Conjunctivae and EOM are normal.   Cardiovascular: Intact distal pulses.   Pulmonary/Chest: Effort normal. No respiratory distress. He has no wheezes.   Musculoskeletal:         General: Deformity present. No edema. Normal range of motion.      Cervical back: Neck supple.      Comments: Left charcot foot and ankle     Neurological: He is alert and oriented to person, place, and time.   Skin: Skin is warm. He is not diaphoretic.   Large full-thickness wounds to lower extremities  Patient colonized with Pseudomonas to bilateral lower extremities.    Psychiatric: Mood, affect and judgment normal.                                                         WOUND ASSESSMENT      Wound 06/05/20 Full Thickness Wound Leg Left --Left Lateral LE (Active)   Wound Image    05/05/21 1530   Site Assessment  Red;Pink;Yellow 05/05/21 1530   Periwound Assessment Dry;Edema 05/05/21 1530   Margins Epibole (rolled edges) 05/05/21 1530   Closure Secondary intention 01/20/21 1300   Drainage Amount Copious 05/05/21 1530   Drainage Description Serosanguineous 05/05/21 1530   Treatments Cleansed;Topical Lidocaine;Provider debridement 05/05/21 1530   Wound Cleansing Foam Cleanser/Washcloth 05/05/21 1530   Periwound Protectant Skin Moisturizer 05/05/21 1530   Dressing Cleansing/Solutions Not Applicable 05/05/21 1530   Dressing Options Hydrofiber Silver;Absorbent Abdominal Pad;Dry Roll Gauze;Tubigrip 05/05/21 1530   Dressing Changed Changed 05/05/21 1530   Dressing Status Clean;Dry;Intact 04/28/21 1400   Dressing Change/Treatment Frequency Daily, and As Needed 05/05/21 1530   Non-staged Wound Description Full thickness 05/05/21 1530   Wound Length (cm) 15.6 cm 05/05/21 1530   Wound Width (cm) 4.9 cm 05/05/21 1530   Wound Depth (cm) 0.3 cm 05/05/21 1530   Wound Surface Area (cm^2) 76.44 cm^2 05/05/21 1530   Wound Volume (cm^3) 22.93 cm^3 05/05/21 1530   Post-Procedure Length (cm) 15.8 cm 05/05/21 1530   Post-Procedure Width (cm) 5 cm 05/05/21 1530   Post-Procedure Depth (cm) 0.3 cm 05/05/21 1530   Post-Procedure Surface Area (cm^2) 79 cm^2 05/05/21 1530   Post-Procedure Volume (cm^3) 23.7 cm^3 05/05/21 1530   Wound Healing % 58 05/05/21 1530   Wound Bed Granulation (%) 10 % 02/10/21 1300   Wound Bed Epithelium (%) 90 % 02/10/21 1300   Wound Bed Slough (%) 90 % 03/31/21 1342   Wound Bed Eschar (%) 0 % 01/13/21 1345   Tunneling (cm) 0 cm 05/05/21 1530   Undermining (cm) 0 cm 05/05/21 1530   Wound Odor None 05/05/21 1530   Exposed Structures None 05/05/21 1530       Wound 01/20/21 --Left Medial/Posterior/Lateral LE (Active)   Wound Image      05/05/21 1530   Site Assessment Red;Pink;Yellow 05/05/21 1530   Periwound Assessment Scar tissue;Edema 05/05/21 1530   Margins Epibole (rolled edges) 05/05/21 1530   Closure Secondary intention  02/24/21 1400   Drainage Amount Copious 05/05/21 1530   Drainage Description Serosanguineous 05/05/21 1530   Treatments Cleansed;Topical Lidocaine;Provider debridement 05/05/21 1530   Wound Cleansing Foam Cleanser/Washcloth 05/05/21 1530   Periwound Protectant Skin Moisturizer 05/05/21 1530   Dressing Cleansing/Solutions Not Applicable 05/05/21 1530   Dressing Options Hydrofiber Silver;Absorbent Abdominal Pad;Dry Roll Gauze;Tubigrip 05/05/21 1530   Dressing Changed Changed 05/05/21 1530   Dressing Status Clean;Dry;Intact 02/24/21 1400   Dressing Change/Treatment Frequency Daily, and As Needed 05/05/21 1530   Non-staged Wound Description Full thickness 05/05/21 1530   Wound Length (cm) 16.5 cm 05/05/21 1530   Wound Width (cm) 10.9 cm 05/05/21 1530   Wound Depth (cm) 0.3 cm 05/05/21 1530   Wound Surface Area (cm^2) 179.85 cm^2 05/05/21 1530   Wound Volume (cm^3) 53.96 cm^3 05/05/21 1530   Post-Procedure Length (cm) 16.8 cm 05/05/21 1530   Post-Procedure Width (cm) 11 cm 05/05/21 1530   Post-Procedure Depth (cm) 0.3 cm 05/05/21 1530   Post-Procedure Surface Area (cm^2) 184.8 cm^2 05/05/21 1530   Post-Procedure Volume (cm^3) 55.44 cm^3 05/05/21 1530   Wound Healing % -4592 05/05/21 1530   Wound Bed Granulation (%) 30 % 02/10/21 1300   Wound Bed Epithelium (%) 0 % 02/10/21 1300   Wound Bed Slough (%) 85 % 03/17/21 1344   Wound Bed Eschar (%) 0 % 02/10/21 1300   Tunneling (cm) 0 cm 05/05/21 1530   Undermining (cm) 0 cm 05/05/21 1530   Wound Odor None 05/05/21 1530   Exposed Structures None 05/05/21 1530       Wound 03/31/21 --Right Lateral LE (Active)   Wound Image    05/05/21 1530   Site Assessment Pink;Yellow;Dry 05/05/21 1530   Periwound Assessment Dry;Edema 05/05/21 1530   Margins Attached edges 05/05/21 1530   Drainage Amount Small 05/05/21 1530   Drainage Description Serosanguineous 05/05/21 1530   Treatments Cleansed;Topical Lidocaine;Provider debridement 05/05/21 1530   Wound Cleansing Foam Cleanser/Washcloth  05/05/21 1530   Periwound Protectant Skin Moisturizer 05/05/21 1530   Dressing Cleansing/Solutions Not Applicable 05/05/21 1530   Dressing Options Hydrofiber Silver;Dry Roll Gauze;Tubigrip 05/05/21 1530   Dressing Changed Changed 05/05/21 1530   Dressing Status Clean;Dry;Intact 04/28/21 1400   Dressing Change/Treatment Frequency Daily, and As Needed 05/05/21 1530   Non-staged Wound Description Full thickness 05/05/21 1530   Wound Length (cm) 0.6 cm 05/05/21 1530   Wound Width (cm) 1 cm 05/05/21 1530   Wound Depth (cm) 0.2 cm 05/05/21 1530   Wound Surface Area (cm^2) 0.6 cm^2 05/05/21 1530   Wound Volume (cm^3) 0.12 cm^3 05/05/21 1530   Post-Procedure Length (cm) 0.9 cm 05/05/21 1530   Post-Procedure Width (cm) 1.2 cm 05/05/21 1530   Post-Procedure Depth (cm) 0.2 cm 05/05/21 1530   Post-Procedure Surface Area (cm^2) 1.08 cm^2 05/05/21 1530   Post-Procedure Volume (cm^3) 0.22 cm^3 05/05/21 1530   Wound Healing % 0 05/05/21 1530   Tunneling (cm) 0 cm 05/05/21 1530   Undermining (cm) 0 cm 05/05/21 1530   Wound Odor None 05/05/21 1530   Exposed Structures None 05/05/21 1530       Wound 04/11/21 Leg Lower Right (Active)       Wound 04/11/21 Leg Lower Left (Active)       Wound 04/11/21 Heel Right DTI (Active)       Wound 04/21/21 Right Lateral Ankle (Active)   Wound Image   05/05/21 1530   Site Assessment Dry 05/05/21 1530   Periwound Assessment Dry;Edema 05/05/21 1530   Margins Undefined edges 04/28/21 1400   Closure Secondary intention 04/21/21 1600   Drainage Amount None 05/05/21 1530   Drainage Description Serosanguineous;Yellow 04/28/21 1400   Treatments Cleansed 05/05/21 1530   Wound Cleansing Foam Cleanser/Washcloth 05/05/21 1530   Periwound Protectant Skin Moisturizer 05/05/21 1530   Dressing Cleansing/Solutions Not Applicable 05/05/21 1530   Dressing Options Dry Roll Gauze;Tubigrip 05/05/21 1530   Dressing Changed New 05/05/21 1530   Dressing Status Clean;Dry;Intact 04/28/21 1400   Dressing Change/Treatment  Frequency Daily, and As Needed 05/05/21 1530   Non-staged Wound Description Not applicable 05/05/21 1530   Wound Length (cm) 4.6 cm 04/28/21 1400   Wound Width (cm) 4.4 cm 04/28/21 1400   Wound Depth (cm) 0.2 cm 04/28/21 1400   Wound Surface Area (cm^2) 20.24 cm^2 04/28/21 1400   Wound Volume (cm^3) 4.05 cm^3 04/28/21 1400   Post-Procedure Length (cm) 4.7 cm 04/28/21 1400   Post-Procedure Width (cm) 4.4 cm 04/28/21 1400   Post-Procedure Depth (cm) 0.2 cm 04/28/21 1400   Post-Procedure Surface Area (cm^2) 20.68 cm^2 04/28/21 1400   Post-Procedure Volume (cm^3) 4.14 cm^3 04/28/21 1400   Wound Healing % -189 04/28/21 1400   Tunneling (cm) 0 cm 04/28/21 1400   Undermining (cm) 0 cm 04/28/21 1400   Wound Odor None 05/05/21 1530   Exposed Structures None 05/05/21 1530       Wound 04/28/21 --Right Medial LE (Active)   Wound Image    05/05/21 1530   Site Assessment Dry;Pink;Yellow 05/05/21 1530   Periwound Assessment Dry;Edema 05/05/21 1530   Margins Attached edges 05/05/21 1530   Drainage Amount Small 05/05/21 1530   Drainage Description Serosanguineous 05/05/21 1530   Treatments Cleansed;Topical Lidocaine;Provider debridement 05/05/21 1530   Wound Cleansing Foam Cleanser/Washcloth 05/05/21 1530   Periwound Protectant Skin Moisturizer 05/05/21 1530   Dressing Cleansing/Solutions Not Applicable 05/05/21 1530   Dressing Options Hydrofiber Silver;Dry Roll Gauze;Tubigrip 05/05/21 1530   Dressing Changed Changed 05/05/21 1530   Dressing Status Clean;Dry;Intact 04/28/21 1400   Dressing Change/Treatment Frequency Daily, and As Needed 05/05/21 1530   Non-staged Wound Description Full thickness 05/05/21 1530   Wound Length (cm) 0.7 cm 05/05/21 1530   Wound Width (cm) 1.3 cm 05/05/21 1530   Wound Depth (cm) 0.1 cm 05/05/21 1530   Wound Surface Area (cm^2) 0.91 cm^2 05/05/21 1530   Wound Volume (cm^3) 0.09 cm^3 05/05/21 1530   Post-Procedure Length (cm) 0.8 cm 05/05/21 1530   Post-Procedure Width (cm) 1.3 cm 05/05/21 1530    Post-Procedure Depth (cm) 0.2 cm 21   Post-Procedure Surface Area (cm^2) 1.04 cm^2 210   Post-Procedure Volume (cm^3) 0.21 cm^3 21 153   Wound Healing % 84 21   Tunneling (cm) 0 cm 21   Undermining (cm) 0 cm 21   Wound Odor None 21   Exposed Structures None 21              PROCEDURE: Excisional debridement of left lower extremity wounds   -2% viscous lidocaine applied topically to wound beds for approximately 10 minutes prior to debridement  -Curette used to excise thick layer of slough from wound beds.  Excisional debridement was performed to remove devitalized tissue from all wounds until healthy, bleeding tissue was visualized.  Total area debrided approximately (100% accuracy of wound measurement difficult due to the diffuse nature of the wounds) 286.6 cm² tissue debrided into the subcutaneous layer of all wounds.    -Bleeding controlled with manual pressure.    -Wounds cleansed with normal saline  -Patient tolerated the procedure well, without c/o pain or discomfort.         Pertinent Labs and Diagnostics:    Labs:     A1c:   Lab Results   Component Value Date/Time    HBA1C 5.7 (H) 2020 11:22 AM      Imagin/3/2020-MRI left tibia-fibula with and without contrast  IMPRESSION:     Moderate lateral leg cellulitis and underlying myositis     No abscess or osteomyelitis    VASCULAR STUDIES: BHUPINDER 2019   Right.    Doppler waveform of the common femoral artery is of high amplitude and    triphasic.    Doppler waveforms at the ankle are brisk and triphasic.    Ankle-brachial index is normal.       Left.    Could not perform ankle-brachial index due to large blisters/ulcers.   Normal toe-brachial index: 0.94   Doppler waveform of the common femoral artery is of high amplitude and    triphasic.    Biphasic waveforms seen at the ankle.     WOUND CULTURE:    2020: Culture collected in clinic  Culture Result Abnormal    Staphylococcus aureus   Light growth     Culture Result Abnormal   Pseudomonas aeruginosa   Light growth     Culture Result Abnormal   Enterococcus faecalis   Light growth              ASSESSMENT AND PLAN:     1. Skin ulcer of left lower leg with fat layer exposed (HCC)  Comment: Patient has history of recurring ulcerations to left lower extremity.  Has undergone ablation procedures by Dr. White.  Discharged from [facility] in March 2020 due to healing, returned approximately 3 weeks later much worse.  Hospitalized from 4/16 until 5/21/2020 underwent multiple surgical debridements.    5/5/2021: Patient's wounds have completely stalled, Pseudomonas still has heavy growth.     -Excisional debridement of wounds in clinic today, medically necessary to promote wound healing.  Patient's tissue has turned very fibrous in nature has poor vascular flow.  -Patient underwent bilateral lower extremity arterial ultrasound left superficial femoral, profunda femoral, popliteal and tibial artery stenosis 50 to 75%.  No stenosis to the right leg.  There is no plan for arterial intervention at this time per Dr. Rhodes.    -Patient to return to clinic weekly for assessment and debridement  -Home health to change dressing 4 times per week in between clinic visits.  Patient's daughter is to change dressing Saturday and Sunday.  We will change the patient's dressing while in clinic 1 time a week.  This will cover the patient for a dressing change every day.  Patient was also advised at minimum if he has severe drainage to remove the dressing and apply roll gauze as needed for excessive drainage.  Goal is to decrease Pseudomonas to try to heal the open wounds.        Wound care: Hydrofiber silver, absorbent abdominal pad, dry roll gauze, Tubigrip E    2. Chronic venous stasis  Comments: Hemosiderin staining, brawny edema, scarring- findings consistent with CVI.  Pt established at [facility], underwent ablation procedures in 2019.      -Patient was seen by Dr. Rhodes at [facility] no further plans for arterial intervention at this time.     3. Acquired deformity of left ankle and foot  Comments: Charcot deformity of foot and ankle.  Very little ROM of ankle.  Patient was referred to orthopedics previously, however did not make appointment.  -Patient will need to follow-up with orthopedics once wounds are healed.    4.  Wound infection     05/5/21: Comments: Patient is colonized with Pseudomonas.  Drainage is quite heavy leading to degradation of wounds and increased pseudomonal slough.  -Patient now has Pseudomonas to right lower extremity as well.  -Patient to shower daily with soap and water dry wound beds thoroughly and reapply dressings daily.  -Increase home health to 4 times a week for additional dressing changes 1 time a week in clinic and his daughter to change on the weekend.    5.  Pain associated with wound    05/5/21:  -2% viscous lidocaine applied topically to wound bed for approximately 5 minutes prior to debridement  -Patient tolerated procedure today with no complaints of discomfort.    6.  Noncompliance  Comments: Patient reports that he only has showered twice last week.  Plan of care was for the patient to shower daily with soap and water to help remove Pseudomonas from bilateral lower extremities then to dry legs thoroughly and reapply dressings as ordered by [facility].  -Patient's home health reports that they attempted to got to the patient's home 4 times last week however the patient did not answer the door on multiple occasions.  Home health states that they left dressings for the patient outside of his door.  Patient states that he had no dressings to change his old saturated dressings with.  Patient has been noncompliant on multiple fronts, previously patient was noncompliant with antibiotic treatment as prescribed by infectious disease.    20 min spent face to face with patient, >50% of time spent counseling, coordinating care,  reviewing records, discussing POC, educating patient.  Time spent in addition to procedural time.      Please note that this note may have been created using voice recognition software. I have worked with technical experts from formerly [facility] to optimize the interface.  I have made every reasonable attempt to correct obvious errors, but there may be errors of grammar and possibly content that I did not discover before finalizing the note.    N

## 2021-05-12 ENCOUNTER — OFFICE VISIT (OUTPATIENT)
Dept: WOUND CARE | Facility: MEDICAL CENTER | Age: 66
End: 2021-05-12
Attending: NURSE PRACTITIONER
Payer: MEDICARE

## 2021-05-12 VITALS
TEMPERATURE: 97.6 F | HEART RATE: 69 BPM | SYSTOLIC BLOOD PRESSURE: 160 MMHG | RESPIRATION RATE: 20 BRPM | DIASTOLIC BLOOD PRESSURE: 100 MMHG | OXYGEN SATURATION: 98 %

## 2021-05-12 DIAGNOSIS — L97.922 SKIN ULCER OF LEFT LOWER LEG WITH FAT LAYER EXPOSED (HCC): Primary | ICD-10-CM

## 2021-05-12 DIAGNOSIS — R52 PAIN ASSOCIATED WITH WOUND: ICD-10-CM

## 2021-05-12 DIAGNOSIS — T14.8XXA PAIN ASSOCIATED WITH WOUND: ICD-10-CM

## 2021-05-12 DIAGNOSIS — M21.962 ACQUIRED DEFORMITY OF LEFT ANKLE AND FOOT: ICD-10-CM

## 2021-05-12 DIAGNOSIS — L08.9 WOUND INFECTION: ICD-10-CM

## 2021-05-12 DIAGNOSIS — I87.8 CHRONIC VENOUS STASIS: ICD-10-CM

## 2021-05-12 DIAGNOSIS — T14.8XXA WOUND INFECTION: ICD-10-CM

## 2021-05-12 DIAGNOSIS — Z91.199 NONCOMPLIANCE: ICD-10-CM

## 2021-05-12 PROCEDURE — 11045 DBRDMT SUBQ TISS EACH ADDL: CPT

## 2021-05-12 PROCEDURE — 11042 DBRDMT SUBQ TIS 1ST 20SQCM/<: CPT

## 2021-05-12 PROCEDURE — 11042 DBRDMT SUBQ TIS 1ST 20SQCM/<: CPT | Performed by: NURSE PRACTITIONER

## 2021-05-12 PROCEDURE — 11045 DBRDMT SUBQ TISS EACH ADDL: CPT | Performed by: NURSE PRACTITIONER

## 2021-05-12 ASSESSMENT — ENCOUNTER SYMPTOMS
WHEEZING: 0
FEVER: 0
COUGH: 0
HEADACHES: 0
SHORTNESS OF BREATH: 0
NAUSEA: 0
WEAKNESS: 0
DIZZINESS: 0
VOMITING: 0
MYALGIAS: 0
CLAUDICATION: 0
PALPITATIONS: 0
CHILLS: 0
DEPRESSION: 1

## 2021-05-12 ASSESSMENT — PAIN SCALES - GENERAL: PAINLEVEL: NO PAIN

## 2021-05-12 NOTE — PATIENT INSTRUCTIONS
-Keep your wound dressing clean, dry, and intact.    -Change your dressing if it becomes soiled, soaked, or falls off.  .   - Resolved wound be fragile ourself on accident.  for a few days, bathe and dry area gently, only ever regains a maximum of 80% of the tensile strength of the surrounding skin, remodeling of scar can continue for 6mo - a year. Contact PCP for a referral back her if any problems with area opening and draining again.    -Should you experience any significant changes in your wound(s), such as infection (redness, swelling, localized heat, increased pain, fever > 101 F, chills) or have any questions regarding your home care instructions, please contact the wound center at [phone]. If after hours, contact your primary care physician or go to the hospital emergency room.

## 2021-05-12 NOTE — PROGRESS NOTES
Provider Encounter- Full Thickness wound    HISTORY OF PRESENT ILLNESS  Wound History:    START OF CARE IN CLINIC: 5/20/2020               REFERRING PROVIDER: KEV Keith                 WOUND ETIOLOGY: venous,  likely mixed etiology              LOCATION: Left lower leg, multiple wounds                                   Right lower leg, multiple wounds-first observed in clinic on initial evaluation, 6/5/2020.  Resolved 9/2/2020                HISTORY: Patient is very well-known to this clinic from previous treatment of wounds to his left lower extremity.  He was discharged from the clinic in March 2020 due to full resolution of his wound.  He returned to the clinic approximately 3 weeks later, on 4/16, with cellulitis, edema, and large open wounds to his left lower leg, and was sent directly to [facility] emergency room.  He was admitted for IV antibiotics and surgical intervention.  On 4/22/2020, he underwent an I&D of his left lower extremity, fasciotomy, and VAC placement.  In the following weeks he underwent surgery 3 more times for irrigation and debridement.  He was discharged home on 5/21, on the VAC, with home health and a referral to [facility].      4/21/2021: Patient was recently discharged from [facility] following being sent from the wound care clinic.  There the patient received IV antibiotics and underwent arterial imaging.  Patient returns to [facility] for care of bilateral lower extremity wounds.      Pertinent Medical History:  Anxiety, Charcot's joint of left foot, non-diabetic (3/21/2016), Chronic congestive heart failure (HCC) (11/16/2017), Hepatitis C, chronic ([facility]), Hypertension, Migraine, Polysubstance abuse ([facility]) (3/8/2018), Tobacco use (4/18/2016), Ulcer of left lower extremity with necrosis of muscle ([facility]) (3/21/2016), and Venous stasis ulcer ([facility]) (2017).                ETIOLOGY HISTORY:  Vascular Surgeon: Dr. White. Compression Circ-aid. Varicose Veins none  visible     TOBACCO USE: Patient denies; patient also denies alcohol and recreational drug use    Patient's problem list, allergies, and current medications reviewed and updated in Epic    Interval History:  Interval History thinned 2/10/2021.  Please see previous notes for complete interval history.       4/21/2021: Clinic visit with KEV Luna. Patient states that they are feeling well today.  Patient denies fever, chills, nausea, vomiting, lightheadedness, dizziness, shortness of breath and chest pain.     4/28/2021: Clinic visit with KEV Luna. Patient states that they are feeling well today.  Patient denies fever, chills, nausea, vomiting, lightheadedness, dizziness, shortness of breath and chest pain.  Patient reports that home health came on Friday and Saturday of last week and not the 4 times as ordered.  Patient also reports that he took a shower every day as instructed.  Later in the conversation the patient stated that he only took a shower on the days that home health came which is not every day.  We also called home health who went to the patient's home all 4 times as ordered however the patient did not answer the door.  Home health left bandages for patient to dress his wound.  Patient reports no bandages were left.  Patient is noncompliant with plan of care and noncompliant with home health assistance.  Patient's wounds are filled with Pseudomonas new wounds to the right lower extremity.    5/5/2021 : Clinic visit with KEV Brantley, RYAN, DONNIEN, CFBARBER.  Kodi states he is feeling well overall, denies fevers, chills, nausea, vomiting, cough or shortness of breath.  He has been taking his Cipro as prescribed, states he has 2 more pills left, will be completing these tomorrow morning.  Home health continues to see him daily during the week for dressing changes.  His daughter is changing his dressing on the weekends.  Is also been showering daily washing his  legs.    5/12/2021: Clinic visit with KEV Luna. Patient states that they are feeling well today.  Patient denies fever, chills, nausea, vomiting, lightheadedness, dizziness, shortness of breath and chest pain.  Patient reports that he has been taking showers daily as instructed.  Patient's pseudomonal load has significantly decreased left lower extremity is improving wounds are more shallow.  Wound to right lower extremity improving.    REVIEW OF SYSTEMS:   Review of Systems   Constitutional: Negative for chills and fever.        States he is eating well, appetite normal   Respiratory: Negative for cough, shortness of breath and wheezing.    Cardiovascular: Positive for leg swelling. Negative for chest pain, palpitations and claudication.        Chronic swelling in legs, for several years   Gastrointestinal: Negative for nausea and vomiting.   Musculoskeletal: Positive for joint pain. Negative for myalgias.        Chronic   Skin:        Multiple diffuse open wounds to left lower extremity a few new open wounds to right lower extremity.  Pseudomonal drainage to bilateral lower extremities.    Neurological: Negative for dizziness, weakness and headaches.   Psychiatric/Behavioral: Positive for depression.       PHYSICAL EXAMINATION:   /100   Pulse 69   Temp 36.4 °C (97.6 °F) (Temporal)   Resp 20   SpO2 98%     Physical Exam  Constitutional:       General: He is not in acute distress.     Appearance: He is not diaphoretic.   HENT:      Head: Normocephalic and atraumatic.   Eyes:      Conjunctiva/sclera: Conjunctivae normal.      Pupils: Pupils are equal, round, and reactive to light.   Pulmonary:      Effort: Pulmonary effort is normal. No respiratory distress.      Breath sounds: No wheezing.   Musculoskeletal:         General: Deformity present. Normal range of motion.      Cervical back: Neck supple.      Comments: Left charcot foot and ankle     Skin:     General: Skin is warm.      Comments:  Large full-thickness wounds to lower extremities  Patient colonized with Pseudomonas to bilateral lower extremities.    Neurological:      Mental Status: He is alert and oriented to person, place, and time.   Psychiatric:         Mood and Affect: Mood and affect normal.         Judgment: Judgment normal.                                                           WOUND ASSESSMENT          Wound 06/05/20 Full Thickness Wound Leg Left --Left Lateral LE (Active)   Wound Image    05/12/21 1400   Site Assessment Red;Pink;Yellow 05/12/21 1400   Periwound Assessment Dry;Edema 05/12/21 1400   Margins Epibole (rolled edges) 05/12/21 1400   Closure Secondary intention 01/20/21 1300   Drainage Amount Copious 05/12/21 1400   Drainage Description Serosanguineous 05/12/21 1400   Treatments Cleansed;Topical Lidocaine;Provider debridement 05/12/21 1400   Wound Cleansing Foam Cleanser/Washcloth 05/12/21 1400   Periwound Protectant Skin Moisturizer 05/12/21 1400   Dressing Cleansing/Solutions Not Applicable 05/05/21 1530   Dressing Options Hydrofiber Silver;Absorbent Abdominal Pad;Dry Roll Gauze;Tubigrip 05/12/21 1400   Dressing Changed Changed 05/12/21 1400   Dressing Status Clean;Dry;Intact 04/28/21 1400   Dressing Change/Treatment Frequency Daily, and As Needed 05/12/21 1400   Non-staged Wound Description Full thickness 05/12/21 1400   Wound Length (cm) 15.1 cm 05/12/21 1400   Wound Width (cm) 4.2 cm 05/12/21 1400   Wound Depth (cm) 0.3 cm 05/12/21 1400   Wound Surface Area (cm^2) 63.42 cm^2 05/12/21 1400   Wound Volume (cm^3) 19.03 cm^3 05/12/21 1400   Post-Procedure Length (cm) 15.3 cm 05/12/21 1400   Post-Procedure Width (cm) 4.3 cm 05/12/21 1400   Post-Procedure Depth (cm) 0.3 cm 05/12/21 1400   Post-Procedure Surface Area (cm^2) 65.79 cm^2 05/12/21 1400   Post-Procedure Volume (cm^3) 19.74 cm^3 05/12/21 1400   Wound Healing % 65 05/12/21 1400   Wound Bed Granulation (%) 10 % 02/10/21 1300   Wound Bed Epithelium (%) 90 %  02/10/21 1300   Wound Bed Slough (%) 90 % 03/31/21 1342   Wound Bed Eschar (%) 0 % 01/13/21 1345   Tunneling (cm) 0 cm 05/12/21 1400   Undermining (cm) 0 cm 05/12/21 1400   Wound Odor None 05/12/21 1400   Exposed Structures None 05/12/21 1400       Wound 01/20/21 --Left Medial/Posterior/Lateral LE (Active)   Wound Image    05/12/21 1400   Site Assessment Red;Pink;Yellow 05/12/21 1400   Periwound Assessment Scar tissue;Edema 05/12/21 1400   Margins Epibole (rolled edges) 05/12/21 1400   Closure Secondary intention 02/24/21 1400   Drainage Amount Large 05/12/21 1400   Drainage Description Serosanguineous 05/12/21 1400   Treatments Cleansed;Topical Lidocaine;Provider debridement 05/12/21 1400   Wound Cleansing Foam Cleanser/Washcloth 05/12/21 1400   Periwound Protectant Skin Moisturizer 05/12/21 1400   Dressing Cleansing/Solutions Not Applicable 05/12/21 1400   Dressing Options Hydrofiber Silver;Absorbent Abdominal Pad;Dry Roll Gauze;Tubigrip 05/12/21 1400   Dressing Changed Changed 05/12/21 1400   Dressing Status Clean;Dry;Intact 02/24/21 1400   Dressing Change/Treatment Frequency Daily, and As Needed 05/12/21 1400   Non-staged Wound Description Full thickness 05/12/21 1400   Wound Length (cm) 16.4 cm 05/12/21 1400   Wound Width (cm) 11.5 cm 05/12/21 1400   Wound Depth (cm) 0.3 cm 05/12/21 1400   Wound Surface Area (cm^2) 188.6 cm^2 05/12/21 1400   Wound Volume (cm^3) 56.58 cm^3 05/12/21 1400   Post-Procedure Length (cm) 16.5 cm 05/12/21 1400   Post-Procedure Width (cm) 11.7 cm 05/12/21 1400   Post-Procedure Depth (cm) 0.4 cm 05/12/21 1400   Post-Procedure Surface Area (cm^2) 193.05 cm^2 05/12/21 1400   Post-Procedure Volume (cm^3) 77.22 cm^3 05/12/21 1400   Wound Healing % -4820 05/12/21 1400   Wound Bed Granulation (%) 30 % 02/10/21 1300   Wound Bed Epithelium (%) 0 % 02/10/21 1300   Wound Bed Slough (%) 85 % 03/17/21 1344   Wound Bed Eschar (%) 0 % 02/10/21 1300   Tunneling (cm) 0 cm 05/12/21 1400   Undermining  (cm) 0 cm 05/12/21 1400   Wound Odor None 05/12/21 1400   Exposed Structures None 05/12/21 1400       Wound 03/31/21 --Right Lateral LE (Active)   Wound Image   05/12/21 1400   Site Assessment Pink;Yellow;Dry 05/12/21 1400   Periwound Assessment Dry;Edema 05/12/21 1400   Margins Attached edges 05/12/21 1400   Drainage Amount None 05/12/21 1400   Drainage Description Serosanguineous 05/05/21 1530   Treatments Cleansed;Site care 05/12/21 1400   Wound Cleansing Foam Cleanser/Washcloth 05/12/21 1400   Periwound Protectant Skin Moisturizer 05/12/21 1400   Dressing Cleansing/Solutions Not Applicable 05/05/21 1530   Dressing Options Dry Roll Gauze;Tubigrip 05/12/21 1400   Dressing Changed Changed 05/12/21 1400   Dressing Status Clean;Dry;Intact 04/28/21 1400   Dressing Change/Treatment Frequency Daily, and As Needed 05/12/21 1400   Non-staged Wound Description Full thickness 05/05/21 1530   Wound Length (cm) 0.6 cm 05/05/21 1530   Wound Width (cm) 1 cm 05/05/21 1530   Wound Depth (cm) 0.2 cm 05/05/21 1530   Wound Surface Area (cm^2) 0.6 cm^2 05/05/21 1530   Wound Volume (cm^3) 0.12 cm^3 05/05/21 1530   Post-Procedure Length (cm) 0.9 cm 05/05/21 1530   Post-Procedure Width (cm) 1.2 cm 05/05/21 1530   Post-Procedure Depth (cm) 0.2 cm 05/05/21 1530   Post-Procedure Surface Area (cm^2) 1.08 cm^2 05/05/21 1530   Post-Procedure Volume (cm^3) 0.22 cm^3 05/05/21 1530   Wound Healing % 0 05/05/21 1530   Tunneling (cm) 0 cm 05/12/21 1400   Undermining (cm) 0 cm 05/12/21 1400   Wound Odor None 05/12/21 1400   Exposed Structures None 05/12/21 1400       Wound 04/11/21 Leg Lower Right (Active)       Wound 04/11/21 Leg Lower Left (Active)       Wound 04/11/21 Heel Right DTI (Active)       Wound 04/21/21 Right Lateral Ankle (Active)   Wound Image   05/12/21 1400   Site Assessment Dry 05/12/21 1400   Periwound Assessment Dry 05/12/21 1400   Margins Undefined edges 05/12/21 1400   Closure Secondary intention 04/21/21 1600   Drainage Amount  Scant 05/12/21 1400   Drainage Description Serosanguineous 05/12/21 1400   Treatments Cleansed;Topical Lidocaine;Site care 05/12/21 1400   Wound Cleansing Foam Cleanser/Washcloth 05/12/21 1400   Periwound Protectant Skin Moisturizer 05/12/21 1400   Dressing Cleansing/Solutions Not Applicable 05/05/21 1530   Dressing Options Dry Roll Gauze;Tubigrip 05/12/21 1400   Dressing Changed Changed 05/12/21 1400   Dressing Status Clean;Dry;Intact 04/28/21 1400   Dressing Change/Treatment Frequency Daily, and As Needed 05/12/21 1400   Non-staged Wound Description Not applicable 05/05/21 1530   Wound Length (cm) 4 cm 05/12/21 1400   Wound Width (cm) 2 cm 05/12/21 1400   Wound Depth (cm) 0.1 cm 05/12/21 1400   Wound Surface Area (cm^2) 8 cm^2 05/12/21 1400   Wound Volume (cm^3) 0.8 cm^3 05/12/21 1400   Post-Procedure Length (cm) 4.7 cm 04/28/21 1400   Post-Procedure Width (cm) 4.4 cm 04/28/21 1400   Post-Procedure Depth (cm) 0.2 cm 04/28/21 1400   Post-Procedure Surface Area (cm^2) 20.68 cm^2 04/28/21 1400   Post-Procedure Volume (cm^3) 4.14 cm^3 04/28/21 1400   Wound Healing % 43 05/12/21 1400   Tunneling (cm) 0 cm 05/12/21 1400   Undermining (cm) 0 cm 05/12/21 1400   Wound Odor None 05/12/21 1400   Exposed Structures None 05/12/21 1400       Wound 04/28/21 --Right Medial LE (Active)   Wound Image    05/12/21 1400   Site Assessment Dry;Pink;Yellow 05/12/21 1400   Periwound Assessment Dry;Edema 05/12/21 1400   Margins Attached edges 05/12/21 1400   Drainage Amount Small 05/12/21 1400   Drainage Description Serosanguineous 05/12/21 1400   Treatments Cleansed;Topical Lidocaine;Provider debridement 05/12/21 1400   Wound Cleansing Foam Cleanser/Washcloth 05/12/21 1400   Periwound Protectant Skin Moisturizer 05/12/21 1400   Dressing Cleansing/Solutions Not Applicable 05/05/21 1530   Dressing Options Hydrofiber Silver;Absorbent Abdominal Pad;Dry Roll Gauze;Tubigrip 05/12/21 1400   Dressing Changed Changed 05/12/21 1400   Dressing  Status Clean;Dry;Intact 21 1400   Dressing Change/Treatment Frequency Daily, and As Needed 21 1400   Non-staged Wound Description Full thickness 21 1400   Wound Length (cm) 0.8 cm 21 1400   Wound Width (cm) 1.3 cm 21 1400   Wound Depth (cm) 0.2 cm 21 1400   Wound Surface Area (cm^2) 1.04 cm^2 21 1400   Wound Volume (cm^3) 0.21 cm^3 21 1400   Post-Procedure Length (cm) 0.9 cm 21 1400   Post-Procedure Width (cm) 1.3 cm 21 1400   Post-Procedure Depth (cm) 0.2 cm 21 1400   Post-Procedure Surface Area (cm^2) 1.17 cm^2 21 1400   Post-Procedure Volume (cm^3) 0.23 cm^3 21 1400   Wound Healing % 63 21 1400   Tunneling (cm) 0 cm 21 1400   Undermining (cm) 0 cm 21 1400   Wound Odor None 21 1400   Exposed Structures None 21 1400       PROCEDURE: Excisional debridement of left lower extremity wounds   -2% viscous lidocaine applied topically to wound beds for approximately 10 minutes prior to debridement  -Curette used to excise thick layer of slough from wound beds.  Excisional debridement was performed to remove devitalized tissue from all wounds until healthy, bleeding tissue was visualized.  Total area debrided approximately (100% accuracy of wound measurement difficult due to the diffuse nature of the wounds)  260.01cm² tissue debrided into the subcutaneous layer of all wounds.    -Bleeding controlled with manual pressure.    -Wounds cleansed with normal saline  -Patient tolerated the procedure well, without c/o pain or discomfort.         Pertinent Labs and Diagnostics:    Labs:     A1c:   Lab Results   Component Value Date/Time    HBA1C 5.7 (H) 2020 11:22 AM      Imagin/3/2020-MRI left tibia-fibula with and without contrast  IMPRESSION:     Moderate lateral leg cellulitis and underlying myositis     No abscess or osteomyelitis    VASCULAR STUDIES: BHUPINDER 2019   Right.    Doppler waveform of the common  femoral artery is of high amplitude and    triphasic.    Doppler waveforms at the ankle are brisk and triphasic.    Ankle-brachial index is normal.       Left.    Could not perform ankle-brachial index due to large blisters/ulcers.   Normal toe-brachial index: 0.94   Doppler waveform of the common femoral artery is of high amplitude and    triphasic.    Biphasic waveforms seen at the ankle.     WOUND CULTURE:    11/18/2020: Culture collected in clinic  Culture Result Abnormal   Staphylococcus aureus   Light growth     Culture Result Abnormal   Pseudomonas aeruginosa   Light growth     Culture Result Abnormal   Enterococcus faecalis   Light growth              ASSESSMENT AND PLAN:     1. Skin ulcer of left lower leg with fat layer exposed (HCC)  Comment: Patient has history of recurring ulcerations to left lower extremity.  Has undergone ablation procedures by Dr. White.  Discharged from [facility] in March 2020 due to healing, returned approximately 3 weeks later much worse.  Hospitalized from 4/16 until 5/21/2020 underwent multiple surgical debridements.    5/12/2021: Patient has significantly decreased pseudomonal load to bilateral lower extremities.  Patient to continue daily showers.  Patient's wounds are more superficial.    -Excisional debridement of wounds in clinic today, medically necessary to promote wound healing.  Patient's tissue has turned very fibrous in nature has poor vascular flow.  -Patient underwent bilateral lower extremity arterial ultrasound left superficial femoral, profunda femoral, popliteal and tibial artery stenosis 50 to 75%.  No stenosis to the right leg.  There is no plan for arterial intervention at this time per Dr. Rhodes.    -Patient to return to clinic weekly for assessment and debridement  -Home health to change dressing 4 times per week in between clinic visits.  Patient's daughter is to change dressing Saturday and Sunday.  We will change the patient's dressing while in clinic 1  time a week.  This will cover the patient for a dressing change every day.  Patient was also advised at minimum if he has severe drainage to remove the dressing and apply roll gauze as needed for excessive drainage.  Goal is to decrease Pseudomonas to try to heal the open wounds.    Wound care: Hydrofiber silver, absorbent abdominal pad, dry roll gauze, Tubigrip E    2. Chronic venous stasis  Comments: Hemosiderin staining, brawny edema, scarring- findings consistent with CVI.  Pt established at [facility], underwent ablation procedures in 2019.     -Patient was seen by Dr. Rhodes at [facility] no further plans for arterial intervention at this time.     3. Acquired deformity of left ankle and foot  Comments: Charcot deformity of foot and ankle.  Very little ROM of ankle.  Patient was referred to orthopedics previously, however did not make appointment.  -Patient will need to follow-up with orthopedics once wounds are healed.    4.  Wound infection     05/12/21: Comments: Patient is colonized with Pseudomonas.  Pseudomonal level decreasing to bilateral lower extremities patient is currently taking a shower reportedly every day.  -Patient to shower daily with soap and water dry wound beds thoroughly and reapply dressings daily.  -Increase home health to 4 times a week for additional dressing changes 1 time a week in clinic and his daughter to change on the weekend.    5.  Pain associated with wound    05/12/21:  -2% viscous lidocaine applied topically to wound bed for approximately 5 minutes prior to debridement  -Patient tolerated procedure today with no complaints of discomfort.    6.  Noncompliance  Comments: Patient has a history of noncompliance not following prescribed wound care dressings or showering daily.  Patient has not been compliant with antibiotic administration in the past.  However, patient's bilateral lower extremities have improved with significant less Pseudomonas patient does report that he is  following the current plan of care.        Please note that this note may have been created using voice recognition software. I have worked with technical experts from [facility] to optimize the interface.  I have made every reasonable attempt to correct obvious errors, but there may be errors of grammar and possibly content that I did not discover before finalizing the note.    N

## 2021-05-17 DIAGNOSIS — F41.9 ANXIETY: ICD-10-CM

## 2021-05-17 DIAGNOSIS — D53.9 MACROCYTIC ANEMIA: ICD-10-CM

## 2021-05-17 DIAGNOSIS — G47.00 INSOMNIA, UNSPECIFIED TYPE: ICD-10-CM

## 2021-05-18 RX ORDER — FERROUS GLUCONATE 324(37.5)
TABLET ORAL
Qty: 30 TABLET | Refills: 3 | Status: SHIPPED | OUTPATIENT
Start: 2021-05-18 | End: 2022-09-22

## 2021-05-18 RX ORDER — SERTRALINE HYDROCHLORIDE 100 MG/1
TABLET, FILM COATED ORAL
Qty: 30 TABLET | Refills: 5 | Status: SHIPPED | OUTPATIENT
Start: 2021-05-18 | End: 2021-10-18

## 2021-05-18 RX ORDER — AMITRIPTYLINE HYDROCHLORIDE 25 MG/1
TABLET, FILM COATED ORAL
Qty: 30 TABLET | Refills: 5 | Status: SHIPPED | OUTPATIENT
Start: 2021-05-18 | End: 2022-05-26

## 2021-05-19 ENCOUNTER — OFFICE VISIT (OUTPATIENT)
Dept: WOUND CARE | Facility: MEDICAL CENTER | Age: 66
End: 2021-05-19
Attending: NURSE PRACTITIONER
Payer: MEDICARE

## 2021-05-19 VITALS
SYSTOLIC BLOOD PRESSURE: 132 MMHG | RESPIRATION RATE: 18 BRPM | OXYGEN SATURATION: 98 % | TEMPERATURE: 97.9 F | HEART RATE: 64 BPM | DIASTOLIC BLOOD PRESSURE: 87 MMHG

## 2021-05-19 DIAGNOSIS — M21.962 ACQUIRED DEFORMITY OF LEFT ANKLE AND FOOT: ICD-10-CM

## 2021-05-19 DIAGNOSIS — I87.8 CHRONIC VENOUS STASIS: ICD-10-CM

## 2021-05-19 DIAGNOSIS — Z91.199 NONCOMPLIANCE: ICD-10-CM

## 2021-05-19 DIAGNOSIS — R52 PAIN ASSOCIATED WITH WOUND: ICD-10-CM

## 2021-05-19 DIAGNOSIS — T14.8XXA PAIN ASSOCIATED WITH WOUND: ICD-10-CM

## 2021-05-19 DIAGNOSIS — T14.8XXA WOUND INFECTION: ICD-10-CM

## 2021-05-19 DIAGNOSIS — L08.9 WOUND INFECTION: ICD-10-CM

## 2021-05-19 DIAGNOSIS — L97.922 SKIN ULCER OF LEFT LOWER LEG WITH FAT LAYER EXPOSED (HCC): Primary | ICD-10-CM

## 2021-05-19 PROCEDURE — 11042 DBRDMT SUBQ TIS 1ST 20SQCM/<: CPT

## 2021-05-19 PROCEDURE — 11045 DBRDMT SUBQ TISS EACH ADDL: CPT | Performed by: NURSE PRACTITIONER

## 2021-05-19 PROCEDURE — 11042 DBRDMT SUBQ TIS 1ST 20SQCM/<: CPT | Performed by: NURSE PRACTITIONER

## 2021-05-19 PROCEDURE — 11045 DBRDMT SUBQ TISS EACH ADDL: CPT

## 2021-05-19 PROCEDURE — 99213 OFFICE O/P EST LOW 20 MIN: CPT

## 2021-05-19 ASSESSMENT — ENCOUNTER SYMPTOMS
WHEEZING: 0
VOMITING: 0
MYALGIAS: 0
NAUSEA: 0
SHORTNESS OF BREATH: 0
DEPRESSION: 1
WEAKNESS: 0
DIZZINESS: 0
CLAUDICATION: 0
HEADACHES: 0
COUGH: 0
PALPITATIONS: 0
FEVER: 0
CHILLS: 0

## 2021-05-19 NOTE — PATIENT INSTRUCTIONS
Avoid prolonged standing or sitting without elevating your legs.  - Apply tubigrip to your legs ending 2 fingers below back of knee without wrinkles.   - Knee-high gradient compression to legs    Should you experience any significant changes in your wound(s), such as infection (redness, swelling, localized heat, increased pain, fever > 101 F, chills) or have any questions regarding your home care instructions, please contact the wound center at [phone]. If after hours, contact your primary care physician or go to the hospital emergency room.   Keep dressing clean, dry and covered while bathing. Only change dressing if it becomes over saturated, soiled or falls off.

## 2021-05-20 NOTE — PROGRESS NOTES
Provider Encounter- Full Thickness wound    HISTORY OF PRESENT ILLNESS  Wound History:    START OF CARE IN CLINIC: 5/20/2020               REFERRING PROVIDER: KEV Keith                 WOUND ETIOLOGY: venous,  likely mixed etiology              LOCATION: Left lower leg, multiple wounds                                   Right lower leg, multiple wounds-first observed in clinic on initial evaluation, 6/5/2020.  Resolved 9/2/2020                HISTORY: Patient is very well-known to this clinic from previous treatment of wounds to his left lower extremity.  He was discharged from the clinic in March 2020 due to full resolution of his wound.  He returned to the clinic approximately 3 weeks later, on 4/16, with cellulitis, edema, and large open wounds to his left lower leg, and was sent directly to [facility] emergency room.  He was admitted for IV antibiotics and surgical intervention.  On 4/22/2020, he underwent an I&D of his left lower extremity, fasciotomy, and VAC placement.  In the following weeks he underwent surgery 3 more times for irrigation and debridement.  He was discharged home on 5/21, on the VAC, with home health and a referral to [facility].      4/21/2021: Patient was recently discharged from [facility] following being sent from the wound care clinic.  There the patient received IV antibiotics and underwent arterial imaging.  Patient returns to [facility] for care of bilateral lower extremity wounds.      Pertinent Medical History:  Anxiety, Charcot's joint of left foot, non-diabetic (3/21/2016), Chronic congestive heart failure (HCC) (11/16/2017), Hepatitis C, chronic ([facility]), Hypertension, Migraine, Polysubstance abuse ([facility]) (3/8/2018), Tobacco use (4/18/2016), Ulcer of left lower extremity with necrosis of muscle ([facility]) (3/21/2016), and Venous stasis ulcer ([facility]) (2017).                ETIOLOGY HISTORY:  Vascular Surgeon: Dr. White. Compression Circ-aid. Varicose Veins none  visible     TOBACCO USE: Patient denies; patient also denies alcohol and recreational drug use    Patient's problem list, allergies, and current medications reviewed and updated in Epic    Interval History:  Interval History thinned 2/10/2021.  Please see previous notes for complete interval history.       4/21/2021: Clinic visit with KEV Luna. Patient states that they are feeling well today.  Patient denies fever, chills, nausea, vomiting, lightheadedness, dizziness, shortness of breath and chest pain.     4/28/2021: Clinic visit with KEV Luna. Patient states that they are feeling well today.  Patient denies fever, chills, nausea, vomiting, lightheadedness, dizziness, shortness of breath and chest pain.  Patient reports that home health came on Friday and Saturday of last week and not the 4 times as ordered.  Patient also reports that he took a shower every day as instructed.  Later in the conversation the patient stated that he only took a shower on the days that home health came which is not every day.  We also called home health who went to the patient's home all 4 times as ordered however the patient did not answer the door.  Home health left bandages for patient to dress his wound.  Patient reports no bandages were left.  Patient is noncompliant with plan of care and noncompliant with home health assistance.  Patient's wounds are filled with Pseudomonas new wounds to the right lower extremity.    5/5/2021 : Clinic visit with KEV Brantley, RYAN, DONNIEN, CFBARBER.  Kodi states he is feeling well overall, denies fevers, chills, nausea, vomiting, cough or shortness of breath.  He has been taking his Cipro as prescribed, states he has 2 more pills left, will be completing these tomorrow morning.  Home health continues to see him daily during the week for dressing changes.  His daughter is changing his dressing on the weekends.  Is also been showering daily washing his  legs.    5/12/2021: Clinic visit with KEV Luna. Patient states that they are feeling well today.  Patient denies fever, chills, nausea, vomiting, lightheadedness, dizziness, shortness of breath and chest pain.  Patient reports that he has been taking showers daily as instructed.  Patient's pseudomonal load has significantly decreased left lower extremity is improving wounds are more shallow.  Wound to right lower extremity improving.    5/19/2021: Clinic visit with KEV Luna. Patient states that they are feeling well today.  Patient denies fever, chills, nausea, vomiting, lightheadedness, dizziness, shortness of breath and chest pain. Patients wound beds have improved with minimal pseudomonal levels.  Patient reports that he is showering every day.  The quality of the tissue has also improved a great deal with the largest wound bed decreasing in dimensions.  Continue current plan of care.    REVIEW OF SYSTEMS:   Review of Systems   Constitutional: Negative for chills and fever.        States he is eating well, appetite normal   Respiratory: Negative for cough, shortness of breath and wheezing.    Cardiovascular: Positive for leg swelling. Negative for chest pain, palpitations and claudication.        Chronic swelling in legs, for several years   Gastrointestinal: Negative for nausea and vomiting.   Musculoskeletal: Positive for joint pain. Negative for myalgias.        Chronic   Skin:        Multiple diffuse open wounds to left lower extremity a few new open wounds to right lower extremity.  Pseudomonal drainage to bilateral lower extremities.    Neurological: Negative for dizziness, weakness and headaches.   Psychiatric/Behavioral: Positive for depression.       PHYSICAL EXAMINATION:   /87   Pulse 64   Temp 36.6 °C (97.9 °F) (Temporal)   Resp 18   SpO2 98%     Physical Exam  Constitutional:       General: He is not in acute distress.     Appearance: He is not diaphoretic.    HENT:      Head: Normocephalic and atraumatic.   Eyes:      Conjunctiva/sclera: Conjunctivae normal.      Pupils: Pupils are equal, round, and reactive to light.   Pulmonary:      Effort: Pulmonary effort is normal. No respiratory distress.      Breath sounds: No wheezing.   Musculoskeletal:         General: Deformity present. Normal range of motion.      Cervical back: Neck supple.      Comments: Left charcot foot and ankle     Skin:     General: Skin is warm.      Comments: Large full-thickness wounds to lower extremities  Patient colonized with Pseudomonas to bilateral lower extremities.    Neurological:      Mental Status: He is alert and oriented to person, place, and time.   Psychiatric:         Mood and Affect: Mood and affect normal.         Judgment: Judgment normal.                                                           WOUND ASSESSMENT            Wound 06/05/20 Full Thickness Wound Leg Left --Left Lateral LE (Active)   Wound Image    05/19/21 1000   Site Assessment Red;Yellow;Edema 05/19/21 1000   Periwound Assessment Edema 05/19/21 1000   Margins Epibole (rolled edges) 05/19/21 1000   Closure Secondary intention 01/20/21 1300   Drainage Amount Large 05/19/21 1000   Drainage Description Serosanguineous;Tan 05/19/21 1000   Treatments Cleansed;Provider debridement;Topical Lidocaine 05/19/21 1000   Wound Cleansing Foam Cleanser/Washcloth 05/19/21 1000   Periwound Protectant Barrier Paste;Skin Protectant Wipes to Periwound 05/19/21 1000   Dressing Cleansing/Solutions Normal Saline 05/19/21 1000   Dressing Options Absorbent Abdominal Pad;Hydrofiber Silver;Dry Roll Gauze;Tubigrip 05/19/21 1000   Dressing Changed Changed 05/19/21 1000   Dressing Status Clean;Dry;Intact 04/28/21 1400   Dressing Change/Treatment Frequency Daily, and As Needed 05/19/21 1000   Non-staged Wound Description Full thickness 05/19/21 1000   Wound Length (cm) 16.5 cm 05/19/21 1000   Wound Width (cm) 5 cm 05/19/21 1000   Wound Depth  (cm) 0.2 cm 05/19/21 1000   Wound Surface Area (cm^2) 82.5 cm^2 05/19/21 1000   Wound Volume (cm^3) 16.5 cm^3 05/19/21 1000   Post-Procedure Length (cm) 17 cm 05/19/21 1000   Post-Procedure Width (cm) 5.4 cm 05/19/21 1000   Post-Procedure Depth (cm) 0.3 cm 05/19/21 1000   Post-Procedure Surface Area (cm^2) 91.8 cm^2 05/19/21 1000   Post-Procedure Volume (cm^3) 27.54 cm^3 05/19/21 1000   Wound Healing % 70 05/19/21 1000   Wound Bed Granulation (%) 80 % 05/19/21 1000   Wound Bed Epithelium (%) 90 % 02/10/21 1300   Wound Bed Slough (%) 20 % 05/19/21 1000   Wound Bed Eschar (%) 0 % 01/13/21 1345   Wound Bed Granulation (%) - Post-Procedure 100 % 05/19/21 1000   Tunneling (cm) 0 cm 05/19/21 1000   Undermining (cm) 0 cm 05/19/21 1000   Wound Odor None 05/19/21 1000   Exposed Structures None 05/19/21 1000       Wound 01/20/21 --Left Medial/Posterior/Lateral LE (Active)   Wound Image    05/19/21 1000   Site Assessment Red;Early/partial granulation;Yellow;Slough 05/19/21 1000   Periwound Assessment Edema 05/19/21 1000   Margins Epibole (rolled edges) 05/19/21 1000   Closure Secondary intention 02/24/21 1400   Drainage Amount Large 05/19/21 1000   Drainage Description Serosanguineous;Tan 05/19/21 1000   Treatments Cleansed 05/19/21 1000   Wound Cleansing Foam Cleanser/Washcloth 05/19/21 1000   Periwound Protectant Barrier Paste;Skin Protectant Wipes to Periwound 05/19/21 1000   Dressing Cleansing/Solutions Normal Saline 05/19/21 1000   Dressing Options Hydrofiber Silver;Absorbent Abdominal Pad;Dry Roll Gauze 05/19/21 1000   Dressing Changed Changed 05/19/21 1000   Dressing Status Clean;Dry;Intact 02/24/21 1400   Dressing Change/Treatment Frequency Daily, and As Needed 05/19/21 1000   Non-staged Wound Description Full thickness 05/19/21 1000   Wound Length (cm) 16.5 cm 05/19/21 1000   Wound Width (cm) 10.5 cm 05/19/21 1000   Wound Depth (cm) 0.4 cm 05/19/21 1000   Wound Surface Area (cm^2) 173.25 cm^2 05/19/21 1000   Wound  Volume (cm^3) 69.3 cm^3 05/19/21 1000   Post-Procedure Length (cm) 16.6 cm 05/19/21 1000   Post-Procedure Width (cm) 11.5 cm 05/19/21 1000   Post-Procedure Depth (cm) 0.6 cm 05/19/21 1000   Post-Procedure Surface Area (cm^2) 190.9 cm^2 05/19/21 1000   Post-Procedure Volume (cm^3) 114.54 cm^3 05/19/21 1000   Wound Healing % -5926 05/19/21 1000   Wound Bed Granulation (%) 80 % 05/19/21 1000   Wound Bed Epithelium (%) 0 % 02/10/21 1300   Wound Bed Slough (%) 20 % 05/19/21 1000   Wound Bed Eschar (%) 0 % 02/10/21 1300   Wound Bed Granulation (%) - Post-Procedure 100 % 05/19/21 1000   Tunneling (cm) 0 cm 05/19/21 1000   Undermining (cm) 0 cm 05/19/21 1000   Wound Odor None 05/19/21 1000   Exposed Structures None 05/19/21 1000       Wound 03/31/21 --Right Lateral LE (Active)   Wound Image   05/12/21 1400   Site Assessment Epithelialization 05/19/21 1000   Periwound Assessment Dry;Edema 05/12/21 1400   Margins Attached edges 05/12/21 1400   Drainage Amount None 05/12/21 1400   Drainage Description Serosanguineous 05/05/21 1530   Treatments Cleansed;Site care 05/12/21 1400   Wound Cleansing Foam Cleanser/Washcloth 05/12/21 1400   Periwound Protectant Skin Moisturizer 05/12/21 1400   Dressing Cleansing/Solutions Not Applicable 05/05/21 1530   Dressing Options Dry Roll Gauze;Tubigrip 05/12/21 1400   Dressing Changed Changed 05/12/21 1400   Dressing Status Clean;Dry;Intact 04/28/21 1400   Dressing Change/Treatment Frequency Daily, and As Needed 05/12/21 1400   Non-staged Wound Description Full thickness 05/05/21 1530   Wound Length (cm) 0.6 cm 05/05/21 1530   Wound Width (cm) 1 cm 05/05/21 1530   Wound Depth (cm) 0.2 cm 05/05/21 1530   Wound Surface Area (cm^2) 0.6 cm^2 05/05/21 1530   Wound Volume (cm^3) 0.12 cm^3 05/05/21 1530   Post-Procedure Length (cm) 0.9 cm 05/05/21 1530   Post-Procedure Width (cm) 1.2 cm 05/05/21 1530   Post-Procedure Depth (cm) 0.2 cm 05/05/21 1530   Post-Procedure Surface Area (cm^2) 1.08 cm^2  05/05/21 1530   Post-Procedure Volume (cm^3) 0.22 cm^3 05/05/21 1530   Wound Healing % 0 05/05/21 1530   Tunneling (cm) 0 cm 05/12/21 1400   Undermining (cm) 0 cm 05/12/21 1400   Wound Odor None 05/12/21 1400   Exposed Structures None 05/12/21 1400       Wound 04/11/21 Leg Lower Right (Active)       Wound 04/11/21 Leg Lower Left (Active)       Wound 04/11/21 Heel Right DTI (Active)       Wound 04/21/21 Right Lateral Ankle (Active)   Wound Image   05/12/21 1400   Site Assessment Epithelialization 05/19/21 1000   Periwound Assessment Dry 05/12/21 1400   Margins Undefined edges 05/12/21 1400   Closure Secondary intention 04/21/21 1600   Drainage Amount Scant 05/12/21 1400   Drainage Description Serosanguineous 05/12/21 1400   Treatments Cleansed;Topical Lidocaine;Site care 05/12/21 1400   Wound Cleansing Foam Cleanser/Washcloth 05/12/21 1400   Periwound Protectant Skin Moisturizer 05/12/21 1400   Dressing Cleansing/Solutions Not Applicable 05/05/21 1530   Dressing Options Dry Roll Gauze;Tubigrip 05/12/21 1400   Dressing Changed Changed 05/12/21 1400   Dressing Status Clean;Dry;Intact 04/28/21 1400   Dressing Change/Treatment Frequency Daily, and As Needed 05/12/21 1400   Non-staged Wound Description Not applicable 05/05/21 1530   Wound Length (cm) 4 cm 05/12/21 1400   Wound Width (cm) 2 cm 05/12/21 1400   Wound Depth (cm) 0.1 cm 05/12/21 1400   Wound Surface Area (cm^2) 8 cm^2 05/12/21 1400   Wound Volume (cm^3) 0.8 cm^3 05/12/21 1400   Post-Procedure Length (cm) 4.7 cm 04/28/21 1400   Post-Procedure Width (cm) 4.4 cm 04/28/21 1400   Post-Procedure Depth (cm) 0.2 cm 04/28/21 1400   Post-Procedure Surface Area (cm^2) 20.68 cm^2 04/28/21 1400   Post-Procedure Volume (cm^3) 4.14 cm^3 04/28/21 1400   Wound Healing % 43 05/12/21 1400   Tunneling (cm) 0 cm 05/12/21 1400   Undermining (cm) 0 cm 05/12/21 1400   Wound Odor None 05/12/21 1400   Exposed Structures None 05/12/21 1400       Wound 04/28/21 --Right Medial LE (Active)    Wound Image   05/19/21 1000   Site Assessment Red;Yellow 05/19/21 1000   Periwound Assessment Dry;Edema 05/19/21 1000   Margins Attached edges 05/19/21 1000   Drainage Amount Small 05/19/21 1000   Drainage Description Serosanguineous 05/19/21 1000   Treatments Cleansed;Provider debridement 05/19/21 1000   Wound Cleansing Foam Cleanser/Washcloth 05/19/21 1000   Periwound Protectant Barrier Paste;Skin Protectant Wipes to Periwound 05/19/21 1000   Dressing Cleansing/Solutions Normal Saline 05/19/21 1000   Dressing Options Hydrofiber Silver;Absorbent Abdominal Pad;Dry Roll Gauze;Tubigrip 05/19/21 1000   Dressing Changed Changed 05/19/21 1000   Dressing Status Clean;Dry;Intact 04/28/21 1400   Dressing Change/Treatment Frequency Every 72 hrs, and As Needed 05/19/21 1000   Non-staged Wound Description Full thickness 05/19/21 1000   Wound Length (cm) 1 cm 05/19/21 1000   Wound Width (cm) 1 cm 05/19/21 1000   Wound Depth (cm) 0.1 cm 05/19/21 1000   Wound Surface Area (cm^2) 1 cm^2 05/19/21 1000   Wound Volume (cm^3) 0.1 cm^3 05/19/21 1000   Post-Procedure Length (cm) 1.5 cm 05/19/21 1000   Post-Procedure Width (cm) 1.5 cm 05/19/21 1000   Post-Procedure Depth (cm) 0.2 cm 05/19/21 1000   Post-Procedure Surface Area (cm^2) 2.25 cm^2 05/19/21 1000   Post-Procedure Volume (cm^3) 0.45 cm^3 05/19/21 1000   Wound Healing % 82 05/19/21 1000   Wound Bed Granulation (%) 80 % 05/19/21 1000   Wound Bed Slough (%) 20 % 05/19/21 1000   Wound Bed Granulation (%) - Post-Procedure 100 % 05/19/21 1000   Tunneling (cm) 0 cm 05/19/21 1000   Undermining (cm) 0 cm 05/19/21 1000   Wound Odor None 05/19/21 1000   Exposed Structures None 05/19/21 1000       Wound 05/19/21 Venous Ulcer Ankle Medial Right R medial ankle CVI ulcer FTW (Active)   Wound Image   05/19/21 1000   Site Assessment Red;Yellow 05/19/21 1000   Periwound Assessment Dry;Intact 05/19/21 1000   Margins Attached edges 05/19/21 1000   Closure Secondary intention 05/19/21 1000    Drainage Amount Small 05/19/21 1000   Drainage Description Serosanguineous 05/19/21 1000   Treatments Cleansed;Provider debridement;Topical Lidocaine 05/19/21 1000   Wound Cleansing Foam Cleanser/Washcloth 05/19/21 1000   Periwound Protectant Skin Protectant Wipes to Periwound 05/19/21 1000   Dressing Cleansing/Solutions Normal Saline 05/19/21 1000   Dressing Options Hydrofiber Silver;Absorbent Abdominal Pad;Dry Roll Gauze;Tubigrip 05/19/21 1000   Dressing Changed New 05/19/21 1000   Dressing Status Clean;Dry;Intact 05/19/21 1000   Dressing Change/Treatment Frequency Daily, and As Needed 05/19/21 1000   Non-staged Wound Description Full thickness 05/19/21 1000   Wound Length (cm) 2 cm 05/19/21 1000   Wound Width (cm) 0.4 cm 05/19/21 1000   Wound Depth (cm) 0.1 cm 05/19/21 1000   Wound Surface Area (cm^2) 0.8 cm^2 05/19/21 1000   Wound Volume (cm^3) 0.08 cm^3 05/19/21 1000   Post-Procedure Length (cm) 2 cm 05/19/21 1000   Post-Procedure Width (cm) 0.4 cm 05/19/21 1000   Post-Procedure Depth (cm) 0.2 cm 05/19/21 1000   Post-Procedure Surface Area (cm^2) 0.8 cm^2 05/19/21 1000   Post-Procedure Volume (cm^3) 0.16 cm^3 05/19/21 1000   Wound Bed Granulation (%) 80 % 05/19/21 1000   Wound Bed Slough (%) 20 % 05/19/21 1000   Wound Bed Granulation (%) - Post-Procedure 100 % 05/19/21 1000   Tunneling (cm) 0 cm 05/19/21 1000   Undermining (cm) 0 cm 05/19/21 1000   Wound Odor None 05/19/21 1000   Exposed Structures None 05/19/21 1000               PROCEDURE: Excisional debridement of left lower extremity wounds   -2% viscous lidocaine applied topically to wound beds for approximately 10 minutes prior to debridement  -Curette used to excise thick layer of slough from wound beds.  Excisional debridement was performed to remove devitalized tissue from all wounds until healthy, bleeding tissue was visualized.  Total area debrided approximately (100% accuracy of wound measurement difficult due to the diffuse nature of the wounds)   285.75 cm² tissue debrided into the subcutaneous layer of all wounds.    -Bleeding controlled with manual pressure.    -Wounds cleansed with normal saline  -Patient tolerated the procedure well, without c/o pain or discomfort.         Pertinent Labs and Diagnostics:    Labs:     A1c:   Lab Results   Component Value Date/Time    HBA1C 5.7 (H) 2020 11:22 AM      Imagin/3/2020-MRI left tibia-fibula with and without contrast  IMPRESSION:     Moderate lateral leg cellulitis and underlying myositis     No abscess or osteomyelitis    VASCULAR STUDIES: BHUPINDER 2019   Right.    Doppler waveform of the common femoral artery is of high amplitude and    triphasic.    Doppler waveforms at the ankle are brisk and triphasic.    Ankle-brachial index is normal.       Left.    Could not perform ankle-brachial index due to large blisters/ulcers.   Normal toe-brachial index: 0.94   Doppler waveform of the common femoral artery is of high amplitude and    triphasic.    Biphasic waveforms seen at the ankle.     WOUND CULTURE:    2020: Culture collected in clinic  Culture Result Abnormal   Staphylococcus aureus   Light growth     Culture Result Abnormal   Pseudomonas aeruginosa   Light growth     Culture Result Abnormal   Enterococcus faecalis   Light growth              ASSESSMENT AND PLAN:     1. Skin ulcer of left lower leg with fat layer exposed (HCC)  Comment: Patient has history of recurring ulcerations to left lower extremity.  Has undergone ablation procedures by Dr. White.  Discharged from [facility] in 2020 due to healing, returned approximately 3 weeks later much worse.  Hospitalized from  until 2020 underwent multiple surgical debridements.    2021: Patient has significantly decreased pseudomonal load to bilateral lower extremities.  Patient to continue daily showers.  The quality of the patients wound beds is improving with granulation tissue to the left lateral wound bed.     -Excisional  debridement of wounds in clinic today, medically necessary to promote wound healing.   -Patient underwent bilateral lower extremity arterial ultrasound left superficial femoral, profunda femoral, popliteal and tibial artery stenosis 50 to 75%.  No stenosis to the right leg.  There is no plan for arterial intervention at this time per Dr. Rhodes.    -Patient to return to clinic weekly for assessment and debridement  -Home health to change dressing 4 times per week in between clinic visits.  Patient's daughter is to change dressing Saturday and Sunday.  We will change the patient's dressing while in clinic 1 time a week.  This will cover the patient for a dressing change every day.  Patient was also advised at minimum if he has severe drainage to remove the dressing and apply roll gauze as needed for excessive drainage.  Goal is to decrease Pseudomonas to try to heal the open wounds.  This strategy is improving the patients tissue quality and decreasing pseudomonal load. Hopefully patient will remain compliant with this instruction.      Wound care: Hydrofiber silver, absorbent abdominal pad, dry roll gauze, Tubigrip E    2. Chronic venous stasis  Comments: Hemosiderin staining, brawny edema, scarring- findings consistent with CVI.  Pt established at [facility], underwent ablation procedures in 2019.     -Patient was seen by Dr. Rhodes at [facility] no further plans for arterial intervention at this time.     3. Acquired deformity of left ankle and foot  Comments: Charcot deformity of foot and ankle.  Very little ROM of ankle.  Patient was referred to orthopedics previously, however did not make appointment.  -Patient will need to follow-up with orthopedics once wounds are healed.    4.  Wound infection     05/19/21: Comments: Patient is colonized with Pseudomonas.  Pseudomonal level decreasing to bilateral lower extremities patient is currently taking a shower reportedly every day. Patient's bilateral lower extremities  have significantly improved (granulation tissue to wound bed minimal pseudomonas to inferior wounds).     -Patient to shower daily with soap and water dry wound beds thoroughly and reapply dressings daily.  -Continue  home health to 4 times a week for additional dressing changes 1 time a week in clinic and his daughter to change on the weekend.    5.  Pain associated with wound    05/19/21:  -2% viscous lidocaine applied topically to wound bed for approximately 5 minutes prior to debridement  -Patient tolerated procedure today with no complaints of discomfort.    6.  Noncompliance  Comments: Patient has a history of noncompliance not following prescribed wound care dressings or showering daily.  Patient has not been compliant with antibiotic administration in the past.  However, patient's bilateral lower extremities have improved with significantly less Pseudomonas patient does report that he is following the current plan of care.        Please note that this note may have been created using voice recognition software. I have worked with technical experts from [facility] [employer] to optimize the interface.  I have made every reasonable attempt to correct obvious errors, but there may be errors of grammar and possibly content that I did not discover before finalizing the note.    N

## 2021-05-28 ENCOUNTER — OFFICE VISIT (OUTPATIENT)
Dept: WOUND CARE | Facility: MEDICAL CENTER | Age: 66
End: 2021-05-28
Attending: NURSE PRACTITIONER
Payer: MEDICARE

## 2021-05-28 VITALS
DIASTOLIC BLOOD PRESSURE: 96 MMHG | RESPIRATION RATE: 18 BRPM | TEMPERATURE: 97.9 F | OXYGEN SATURATION: 98 % | SYSTOLIC BLOOD PRESSURE: 143 MMHG | HEART RATE: 79 BPM

## 2021-05-28 DIAGNOSIS — I83.029 VENOUS STASIS ULCER OF LEFT LOWER EXTREMITY (HCC): ICD-10-CM

## 2021-05-28 DIAGNOSIS — M14.672 CHARCOT'S JOINT OF LEFT FOOT, NON-DIABETIC: ICD-10-CM

## 2021-05-28 DIAGNOSIS — L08.9 WOUND INFECTION: ICD-10-CM

## 2021-05-28 DIAGNOSIS — R52 PAIN ASSOCIATED WITH WOUND: ICD-10-CM

## 2021-05-28 DIAGNOSIS — I87.8 CHRONIC VENOUS STASIS: ICD-10-CM

## 2021-05-28 DIAGNOSIS — T14.8XXA PAIN ASSOCIATED WITH WOUND: ICD-10-CM

## 2021-05-28 DIAGNOSIS — L97.929 ULCER OF LEFT LOWER EXTREMITY, UNSPECIFIED ULCER STAGE (HCC): ICD-10-CM

## 2021-05-28 DIAGNOSIS — M21.962 ACQUIRED DEFORMITY OF LEFT ANKLE AND FOOT: ICD-10-CM

## 2021-05-28 DIAGNOSIS — L97.922 SKIN ULCER OF LEFT LOWER LEG WITH FAT LAYER EXPOSED (HCC): ICD-10-CM

## 2021-05-28 DIAGNOSIS — L97.929 ULCERS OF BOTH LOWER EXTREMITIES, UNSPECIFIED ULCER STAGE (HCC): ICD-10-CM

## 2021-05-28 DIAGNOSIS — L97.929 VENOUS STASIS ULCER OF LEFT LOWER EXTREMITY (HCC): ICD-10-CM

## 2021-05-28 DIAGNOSIS — L97.919 ULCERS OF BOTH LOWER EXTREMITIES, UNSPECIFIED ULCER STAGE (HCC): ICD-10-CM

## 2021-05-28 DIAGNOSIS — L97.921 ULCER OF LEFT LOWER EXTREMITY, LIMITED TO BREAKDOWN OF SKIN (HCC): ICD-10-CM

## 2021-05-28 DIAGNOSIS — T14.8XXA WOUND INFECTION: ICD-10-CM

## 2021-05-28 PROCEDURE — 11045 DBRDMT SUBQ TISS EACH ADDL: CPT

## 2021-05-28 PROCEDURE — 11042 DBRDMT SUBQ TIS 1ST 20SQCM/<: CPT

## 2021-05-28 ASSESSMENT — ENCOUNTER SYMPTOMS
NAUSEA: 0
CLAUDICATION: 0
CHILLS: 0
WHEEZING: 0
COUGH: 0
FEVER: 0
PALPITATIONS: 0
WEAKNESS: 0
SHORTNESS OF BREATH: 0
DEPRESSION: 1
VOMITING: 0
HEADACHES: 0
MYALGIAS: 0
DIZZINESS: 0

## 2021-05-28 ASSESSMENT — PAIN SCALES - GENERAL: PAINLEVEL: NO PAIN

## 2021-05-28 NOTE — PATIENT INSTRUCTIONS
Apply silver hydrofiber to wound bed as directed by provider. Cover wound with abdominal pad and change dressing every day. Secure dressing with tubigrip.    Should you experience any significant changes in your wound(s), such as infection (redness, swelling, localized heat, increased pain, fever > 101 F, chills) or have any questions regarding your home care instructions, please contact the wound center at [phone]. If after hours, contact your primary care physician or go to the hospital emergency room.   Keep dressing clean, dry and covered while bathing. Only change dressing if it becomes over saturated, soiled or falls off.

## 2021-05-28 NOTE — PROGRESS NOTES
Provider Encounter- Full Thickness wound    HISTORY OF PRESENT ILLNESS  Wound History:    START OF CARE IN CLINIC: 5/20/2020               REFERRING PROVIDER: KEV Keith                 WOUND ETIOLOGY: venous,  likely mixed etiology              LOCATION: Left lower leg, multiple wounds                                   Right lower leg, multiple wounds-first observed in clinic on initial evaluation, 6/5/2020.  Resolved 9/2/2020                HISTORY: Patient is very well-known to this clinic from previous treatment of wounds to his left lower extremity.  He was discharged from the clinic in March 2020 due to full resolution of his wound.  He returned to the clinic approximately 3 weeks later, on 4/16, with cellulitis, edema, and large open wounds to his left lower leg, and was sent directly to [facility] emergency room.  He was admitted for IV antibiotics and surgical intervention.  On 4/22/2020, he underwent an I&D of his left lower extremity, fasciotomy, and VAC placement.  In the following weeks he underwent surgery 3 more times for irrigation and debridement.  He was discharged home on 5/21, on the VAC, with home health and a referral to [facility].      4/21/2021: Patient was recently discharged from [facility] following being sent from the wound care clinic.  There the patient received IV antibiotics and underwent arterial imaging.  Patient returns to [facility] for care of bilateral lower extremity wounds.      Pertinent Medical History:  Anxiety, Charcot's joint of left foot, non-diabetic (3/21/2016), Chronic congestive heart failure (HCC) (11/16/2017), Hepatitis C, chronic ([facility]), Hypertension, Migraine, Polysubstance abuse ([facility]) (3/8/2018), Tobacco use (4/18/2016), Ulcer of left lower extremity with necrosis of muscle ([facility]) (3/21/2016), and Venous stasis ulcer (Formerly [facility]) (2017).                ETIOLOGY HISTORY:  Vascular Surgeon: Dr. White. Compression Circ-aid. Varicose Veins none  visible     TOBACCO USE: Patient denies; patient also denies alcohol and recreational drug use    Patient's problem list, allergies, and current medications reviewed and updated in Epic    Interval History:  Interval History thinned 2/10/2021.  Please see previous notes for complete interval history.       4/21/2021: Clinic visit with KEV Luna. Patient states that they are feeling well today.  Patient denies fever, chills, nausea, vomiting, lightheadedness, dizziness, shortness of breath and chest pain.     4/28/2021: Clinic visit with KEV Luna. Patient states that they are feeling well today.  Patient denies fever, chills, nausea, vomiting, lightheadedness, dizziness, shortness of breath and chest pain.  Patient reports that home health came on Friday and Saturday of last week and not the 4 times as ordered.  Patient also reports that he took a shower every day as instructed.  Later in the conversation the patient stated that he only took a shower on the days that home health came which is not every day.  We also called home health who went to the patient's home all 4 times as ordered however the patient did not answer the door.  Home health left bandages for patient to dress his wound.  Patient reports no bandages were left.  Patient is noncompliant with plan of care and noncompliant with home health assistance.  Patient's wounds are filled with Pseudomonas new wounds to the right lower extremity.    5/5/2021 : Clinic visit with KEV Brantley, RYAN, DONNIEN, CFBARBER.  Kodi states he is feeling well overall, denies fevers, chills, nausea, vomiting, cough or shortness of breath.  He has been taking his Cipro as prescribed, states he has 2 more pills left, will be completing these tomorrow morning.  Home health continues to see him daily during the week for dressing changes.  His daughter is changing his dressing on the weekends.  Is also been showering daily washing his  legs.    5/12/2021: Clinic visit with KEV Luna. Patient states that they are feeling well today.  Patient denies fever, chills, nausea, vomiting, lightheadedness, dizziness, shortness of breath and chest pain.  Patient reports that he has been taking showers daily as instructed.  Patient's pseudomonal load has significantly decreased left lower extremity is improving wounds are more shallow.  Wound to right lower extremity improving.    5/19/2021: Clinic visit with KEV Luna. Patient states that they are feeling well today.  Patient denies fever, chills, nausea, vomiting, lightheadedness, dizziness, shortness of breath and chest pain. Patients wound beds have improved with minimal pseudomonal levels.  Patient reports that he is showering every day.  The quality of the tissue has also improved a great deal with the largest wound bed decreasing in dimensions.  Continue current plan of care.    5/28/2021 : Clinic visit with Dr. Ayon.  Returns for a follow-up visit has questions if there is any soap that we recommend advised him not to use antibacterial soap but good gentle soap and lots of water.  He denies chills fever headache cough chest pain abdominal pain or other acute medical issues.    REVIEW OF SYSTEMS:   Review of Systems   Constitutional: Negative for chills and fever.        States he is eating well, appetite normal   Respiratory: Negative for cough, shortness of breath and wheezing.    Cardiovascular: Positive for leg swelling. Negative for chest pain, palpitations and claudication.        Chronic swelling in legs, for several years   Gastrointestinal: Negative for nausea and vomiting.   Musculoskeletal: Positive for joint pain. Negative for myalgias.        Chronic   Skin:        Multiple diffuse open wounds to left lower extremity a few new open wounds to right lower extremity.  Pseudomonal drainage to bilateral lower extremities.    Neurological: Negative for dizziness,  weakness and headaches.   Psychiatric/Behavioral: Positive for depression.       PHYSICAL EXAMINATION:   There were no vitals taken for this visit.    Physical Exam  Constitutional:       General: He is not in acute distress.     Appearance: He is not diaphoretic.   HENT:      Head: Normocephalic and atraumatic.   Eyes:      Conjunctiva/sclera: Conjunctivae normal.      Pupils: Pupils are equal, round, and reactive to light.   Pulmonary:      Effort: Pulmonary effort is normal. No respiratory distress.      Breath sounds: No wheezing.   Musculoskeletal:         General: Deformity present. Normal range of motion.      Cervical back: Neck supple.      Comments: Left charcot foot and ankle     Skin:     General: Skin is warm.      Comments: Large full-thickness wounds to lower extremities  Patient colonized with Pseudomonas to bilateral lower extremities.    Neurological:      Mental Status: He is alert and oriented to person, place, and time.   Psychiatric:         Mood and Affect: Mood and affect normal.         Judgment: Judgment normal.                                                           WOUND ASSESSMENT            Wound 06/05/20 Full Thickness Wound Leg Left --Left Lateral LE (Active)   Wound Image    05/28/21 1100   Site Assessment Red;Yellow;Edema 05/28/21 1100   Periwound Assessment Edema;Scar tissue 05/28/21 1100   Margins Epibole (rolled edges) 05/28/21 1100   Closure Secondary intention 01/20/21 1300   Drainage Amount Large 05/28/21 1100   Drainage Description Serosanguineous 05/28/21 1100   Treatments Cleansed;Topical Lidocaine;Provider debridement;Site care 05/28/21 1100   Wound Cleansing Normal Saline Irrigation 05/28/21 1100   Periwound Protectant Barrier Paste 05/28/21 1100   Dressing Cleansing/Solutions Normal Saline 05/19/21 1000   Dressing Options Hydrofiber Silver;Absorbent Abdominal Pad;Dry Roll Gauze;Tubigrip 05/28/21 1100   Dressing Changed Changed 05/19/21 1000   Dressing Status  Clean;Dry;Intact 04/28/21 1400   Dressing Change/Treatment Frequency Daily, and As Needed 05/19/21 1000   Non-staged Wound Description Full thickness 05/28/21 1100   Wound Length (cm) 16.5 cm 05/28/21 1100   Wound Width (cm) 5.1 cm 05/28/21 1100   Wound Depth (cm) 0.2 cm 05/28/21 1100   Wound Surface Area (cm^2) 84.15 cm^2 05/28/21 1100   Wound Volume (cm^3) 16.83 cm^3 05/28/21 1100   Post-Procedure Length (cm) 16.6 cm 05/28/21 1100   Post-Procedure Width (cm) 5.1 cm 05/28/21 1100   Post-Procedure Depth (cm) 0.3 cm 05/28/21 1100   Post-Procedure Surface Area (cm^2) 84.66 cm^2 05/28/21 1100   Post-Procedure Volume (cm^3) 25.4 cm^3 05/28/21 1100   Wound Healing % 69 05/28/21 1100   Wound Bed Granulation (%) 40 % 05/28/21 1100   Wound Bed Epithelium (%) 90 % 02/10/21 1300   Wound Bed Slough (%) 60 % 05/28/21 1100   Wound Bed Eschar (%) 0 % 01/13/21 1345   Wound Bed Granulation (%) - Post-Procedure 100 % 05/19/21 1000   Tunneling (cm) 0 cm 05/28/21 1100   Undermining (cm) 0 cm 05/28/21 1100   Wound Odor None 05/28/21 1100   Exposed Structures None 05/28/21 1100       Wound 01/20/21 --Left Medial/Posterior/Lateral LE (Active)   Wound Image    05/28/21 1100   Site Assessment Red;Early/partial granulation;Yellow;Slough 05/28/21 1100   Periwound Assessment Edema;Scar tissue 05/28/21 1100   Margins Epibole (rolled edges) 05/28/21 1100   Closure Secondary intention 02/24/21 1400   Drainage Amount Large 05/28/21 1100   Drainage Description Serosanguineous 05/28/21 1100   Treatments Cleansed;Topical Lidocaine;Provider debridement;Site care 05/28/21 1100   Wound Cleansing Normal Saline Irrigation 05/28/21 1100   Periwound Protectant Barrier Paste;TAC Ointment 05/28/21 1100   Dressing Cleansing/Solutions Normal Saline 05/19/21 1000   Dressing Options Hydrofiber Silver;Absorbent Abdominal Pad;Dry Roll Gauze;Tubigrip 05/28/21 1100   Dressing Changed Changed 05/19/21 1000   Dressing Status Clean;Dry;Intact 02/24/21 1400   Dressing  Change/Treatment Frequency Daily, and As Needed 05/19/21 1000   Non-staged Wound Description Full thickness 05/28/21 1100   Wound Length (cm) 16 cm 05/28/21 1100   Wound Width (cm) 11 cm 05/28/21 1100   Wound Depth (cm) 0.4 cm 05/28/21 1100   Wound Surface Area (cm^2) 176 cm^2 05/28/21 1100   Wound Volume (cm^3) 70.4 cm^3 05/28/21 1100   Post-Procedure Length (cm) 17 cm 05/28/21 1100   Post-Procedure Width (cm) 16 cm 05/28/21 1100   Post-Procedure Depth (cm) 0.5 cm 05/28/21 1100   Post-Procedure Surface Area (cm^2) 272 cm^2 05/28/21 1100   Post-Procedure Volume (cm^3) 136 cm^3 05/28/21 1100   Wound Healing % -6022 05/28/21 1100   Wound Bed Granulation (%) 70 % 05/28/21 1100   Wound Bed Epithelium (%) 0 % 02/10/21 1300   Wound Bed Slough (%) 30 % 05/28/21 1100   Wound Bed Eschar (%) 0 % 02/10/21 1300   Wound Bed Granulation (%) - Post-Procedure 100 % 05/19/21 1000   Tunneling (cm) 0 cm 05/28/21 1100   Undermining (cm) 0 cm 05/28/21 1100   Wound Odor None 05/28/21 1100   Exposed Structures None 05/28/21 1100       Wound 04/11/21 Leg Lower Right (Active)       Wound 04/11/21 Leg Lower Left (Active)       Wound 04/11/21 Heel Right DTI (Active)       Wound 04/28/21 --Right Medial LE (Active)   Wound Image    05/28/21 1100   Site Assessment Red 05/28/21 1100   Periwound Assessment Dry;Edema 05/28/21 1100   Margins Attached edges 05/28/21 1100   Drainage Amount Small 05/28/21 1100   Drainage Description Serosanguineous 05/28/21 1100   Treatments Cleansed;Topical Lidocaine;Provider debridement;Site care 05/28/21 1100   Wound Cleansing Normal Saline Irrigation 05/28/21 1100   Periwound Protectant Barrier Paste 05/28/21 1100   Dressing Cleansing/Solutions Normal Saline 05/19/21 1000   Dressing Options Hydrofiber Silver;Nonadhesive Foam;Dry Roll Gauze;Tubigrip 05/28/21 1100   Dressing Changed Changed 05/19/21 1000   Dressing Status Clean;Dry;Intact 04/28/21 1400   Dressing Change/Treatment Frequency Every 72 hrs, and As Needed  05/19/21 1000   Non-staged Wound Description Full thickness 05/19/21 1000   Wound Length (cm) 0.6 cm 05/28/21 1100   Wound Width (cm) 0.7 cm 05/28/21 1100   Wound Depth (cm) 0.1 cm 05/28/21 1100   Wound Surface Area (cm^2) 0.42 cm^2 05/28/21 1100   Wound Volume (cm^3) 0.04 cm^3 05/28/21 1100   Post-Procedure Length (cm) 0.6 cm 05/28/21 1100   Post-Procedure Width (cm) 0.8 cm 05/28/21 1100   Post-Procedure Depth (cm) 0.1 cm 05/28/21 1100   Post-Procedure Surface Area (cm^2) 0.48 cm^2 05/28/21 1100   Post-Procedure Volume (cm^3) 0.05 cm^3 05/28/21 1100   Wound Healing % 93 05/28/21 1100   Wound Bed Granulation (%) 80 % 05/19/21 1000   Wound Bed Slough (%) 10 % 05/28/21 1100   Wound Bed Granulation (%) - Post-Procedure 100 % 05/19/21 1000   Tunneling (cm) 0 cm 05/28/21 1100   Undermining (cm) 0 cm 05/28/21 1100   Wound Odor None 05/28/21 1100   Exposed Structures None 05/28/21 1100       Wound 05/19/21 Venous Ulcer Ankle Medial Right R medial ankle CVI ulcer FTW (Active)   Wound Image    05/28/21 1100   Site Assessment Red 05/28/21 1100   Periwound Assessment Dry;Intact 05/28/21 1100   Margins Attached edges 05/28/21 1100   Closure Secondary intention 05/19/21 1000   Drainage Amount Small 05/28/21 1100   Drainage Description Serosanguineous 05/28/21 1100   Treatments Cleansed;Topical Lidocaine;Provider debridement;Site care 05/28/21 1100   Wound Cleansing Normal Saline Irrigation 05/28/21 1100   Periwound Protectant Barrier Paste 05/28/21 1100   Dressing Cleansing/Solutions Normal Saline 05/19/21 1000   Dressing Options Hydrofiber Silver;Nonadhesive Foam;Dry Roll Gauze;Tubigrip 05/28/21 1100   Dressing Changed New 05/19/21 1000   Dressing Status Clean;Dry;Intact 05/19/21 1000   Dressing Change/Treatment Frequency Daily, and As Needed 05/19/21 1000   Non-staged Wound Description Full thickness 05/28/21 1100   Wound Length (cm) 0.5 cm 05/28/21 1100   Wound Width (cm) 0.3 cm 05/28/21 1100   Wound Depth (cm) 0.1 cm  05/28/21 1100   Wound Surface Area (cm^2) 0.15 cm^2 05/28/21 1100   Wound Volume (cm^3) 0.02 cm^3 05/28/21 1100   Post-Procedure Length (cm) 0.5 cm 05/28/21 1100   Post-Procedure Width (cm) 0.4 cm 05/28/21 1100   Post-Procedure Depth (cm) 0.1 cm 05/28/21 1100   Post-Procedure Surface Area (cm^2) 0.2 cm^2 05/28/21 1100   Post-Procedure Volume (cm^3) 0.02 cm^3 05/28/21 1100   Wound Healing % 75 05/28/21 1100   Wound Bed Granulation (%) 80 % 05/19/21 1000   Wound Bed Slough (%) 10 % 05/28/21 1100   Wound Bed Granulation (%) - Post-Procedure 100 % 05/19/21 1000   Tunneling (cm) 0 cm 05/28/21 1100   Undermining (cm) 0 cm 05/28/21 1100   Wound Odor None 05/28/21 1100   Exposed Structures None 05/28/21 1100       Wound 05/28/21 LLE Anterior Proximal (Active)   Wound Image    05/28/21 1100   Site Assessment Red;Yellow 05/28/21 1100   Periwound Assessment Edema;Fragile 05/28/21 1100   Margins Attached edges 05/28/21 1100   Drainage Amount Moderate 05/28/21 1100   Drainage Description Serosanguineous 05/28/21 1100   Treatments Cleansed;Topical Lidocaine;Provider debridement;Site care 05/28/21 1100   Wound Cleansing Normal Saline Irrigation 05/28/21 1100   Periwound Protectant Barrier Paste 05/28/21 1100   Dressing Options Hydrofiber Silver;Nonadhesive Foam;Dry Roll Gauze;Tubigrip 05/28/21 1100   Non-staged Wound Description Full thickness 05/28/21 1100   Wound Length (cm) 1.5 cm 05/28/21 1100   Wound Width (cm) 2.3 cm 05/28/21 1100   Wound Depth (cm) 0.1 cm 05/28/21 1100   Wound Surface Area (cm^2) 3.45 cm^2 05/28/21 1100   Wound Volume (cm^3) 0.34 cm^3 05/28/21 1100   Post-Procedure Length (cm) 2 cm 05/28/21 1100   Post-Procedure Width (cm) 2.3 cm 05/28/21 1100   Post-Procedure Depth (cm) 0.1 cm 05/28/21 1100   Post-Procedure Surface Area (cm^2) 4.6 cm^2 05/28/21 1100   Post-Procedure Volume (cm^3) 0.46 cm^3 05/28/21 1100   Wound Bed Slough (%) 35 % 05/28/21 1100   Tunneling (cm) 0 cm 05/28/21 1100   Undermining (cm) 0 cm  05/28/21 1100   Wound Odor None 05/28/21 1100   Exposed Structures None 05/28/21 1100       Wound 05/28/21 LLE Medial Proximal (Active)   Wound Image    05/28/21 1100   Site Assessment Red;Yellow 05/28/21 1100   Periwound Assessment Edema;Fragile 05/28/21 1100   Margins Attached edges 05/28/21 1100   Drainage Amount Moderate 05/28/21 1100   Drainage Description Serosanguineous 05/28/21 1100   Treatments Cleansed;Topical Lidocaine;Provider debridement;Site care 05/28/21 1100   Wound Cleansing Normal Saline Irrigation 05/28/21 1100   Periwound Protectant Barrier Paste 05/28/21 1100   Dressing Options Hydrofiber Silver;Nonadhesive Foam;Dry Roll Gauze;Tubigrip 05/28/21 1100   Non-staged Wound Description Full thickness 05/28/21 1100   Wound Length (cm) 0.4 cm 05/28/21 1100   Wound Width (cm) 0.4 cm 05/28/21 1100   Wound Depth (cm) 0.1 cm 05/28/21 1100   Wound Surface Area (cm^2) 0.16 cm^2 05/28/21 1100   Wound Volume (cm^3) 0.02 cm^3 05/28/21 1100   Post-Procedure Length (cm) 1 cm 05/28/21 1100   Post-Procedure Width (cm) 3 cm 05/28/21 1100   Post-Procedure Depth (cm) 0.1 cm 05/28/21 1100   Post-Procedure Surface Area (cm^2) 3 cm^2 05/28/21 1100   Post-Procedure Volume (cm^3) 0.3 cm^3 05/28/21 1100   Wound Bed Slough (%) 35 % 05/28/21 1100   Tunneling (cm) 0 cm 05/28/21 1100   Undermining (cm) 0 cm 05/28/21 1100   Wound Odor None 05/28/21 1100   Exposed Structures None 05/28/21 1100          PROCEDURE: Excisional debridement of left lower extremity wounds   -2% viscous lidocaine applied topically to wound beds for approximately 10 minutes prior to debridement  -4 mm curette used to excise thick layer of slough from all wound beds.  Excisional debridement was performed to remove devitalized tissue from all wounds until healthy, bleeding tissue was visualized.  Total area debrided approximately (100% accuracy of wound measurement difficult due to the diffuse nature of the wounds)  364.94 cm² tissue debrided into the  subcutaneous layer of all wounds.    -Bleeding controlled with manual pressure.    -Wounds cleansed with normal saline  -Patient tolerated the procedure well, without c/o pain or discomfort.         Pertinent Labs and Diagnostics:    Labs:     A1c:   Lab Results   Component Value Date/Time    HBA1C 5.7 (H) 2020 11:22 AM      Imagin/3/2020-MRI left tibia-fibula with and without contrast  IMPRESSION:     Moderate lateral leg cellulitis and underlying myositis     No abscess or osteomyelitis    VASCULAR STUDIES: BHUPINDER 2019   Right.    Doppler waveform of the common femoral artery is of high amplitude and    triphasic.    Doppler waveforms at the ankle are brisk and triphasic.    Ankle-brachial index is normal.       Left.    Could not perform ankle-brachial index due to large blisters/ulcers.   Normal toe-brachial index: 0.94   Doppler waveform of the common femoral artery is of high amplitude and    triphasic.    Biphasic waveforms seen at the ankle.     WOUND CULTURE:    2020: Culture collected in clinic  Culture Result Abnormal   Staphylococcus aureus   Light growth     Culture Result Abnormal   Pseudomonas aeruginosa   Light growth     Culture Result Abnormal   Enterococcus faecalis   Light growth              ASSESSMENT AND PLAN:     1. Skin ulcer of left lower leg with fat layer exposed (HCC)  Comment: Patient has history of recurring ulcerations to left lower extremity.  Has undergone ablation procedures by Dr. White.  Discharged from [facility] in 2020 due to healing, returned approximately 3 weeks later much worse.  Hospitalized from  until 2020 underwent multiple surgical debridements.    2021: Patient has decreased pseudomonal load to  lower extremities.  Patient to continue daily showers.  The quality of the patients wound beds is improving with granulation tissue to the left lateral wound bed.     -Excisional debridement of wounds in clinic today, medically necessary  to promote wound healing.   -Patient underwent bilateral lower extremity arterial ultrasound left superficial femoral, profunda femoral, popliteal and tibial artery stenosis 50 to 75%.  No stenosis to the right leg.  There is no plan for arterial intervention at this time per Dr. Rhodes.    -Patient to return to clinic weekly for assessment and debridement  -Home health to change dressing 4 times per week in between clinic visits.  Patient's daughter is to change dressing Saturday and Sunday.  We will change the patient's dressing while in clinic 1 time a week.  This will cover the patient for a dressing change every day.  Patient was again advised at minimum if he has severe drainage to remove the dressing and apply roll gauze as needed for excessive drainage.   Wound care: Hydrofiber silver, absorbent abdominal pad, dry roll gauze, Tubigrip E    2. Chronic venous stasis  Comments: Hemosiderin staining, brawny edema, scarring- findings consistent with CVI.  Pt established at [facility], underwent ablation procedures in 2019.     -Patient was seen by Dr. Rhodes at [facility] no further plans for arterial intervention at this time.     3. Acquired deformity of left ankle and foot  Comments: Charcot deformity of foot and ankle.  Very little ROM of ankle.  Patient was referred to orthopedics previously, however did not make appointment.  -Patient will need to follow-up with orthopedics once wounds are healed.    4.  Wound infection     5/28/2021:   Pseudomonal level decreasing to bilateral lower extremities patient is currently taking a shower reportedly every day. Patient's bilateral lower extremities have significantly improved (granulation tissue to wound bed minimal pseudomonas to inferior wounds).     -Patient to shower daily with soap and water dry wound beds thoroughly and reapply dressings daily.  -Continue  home health to 4 times a week for additional dressing changes 1 time a week in clinic and his daughter to  change on the weekend.    5.  Pain associated with wound    05/19/21:  -2% viscous lidocaine applied topically to wound bed for approximately 5 minutes prior to debridement  -Patient tolerated procedure today with no complaints of discomfort.    6.  Noncompliance  Comments: Patient has a history of noncompliance not following prescribed wound care dressings or showering daily.  Patient has not been compliant with antibiotic administration in the past.  However, patient's bilateral lower extremities have improved with significantly less Pseudomonas patient does report that he is following the current plan of care.        Please note that this note may have been created using voice recognition software. I have worked with technical experts from [facility] [employer] to optimize the interface.  I have made every reasonable attempt to correct obvious errors, but there may be errors of grammar and possibly content that I did not discover before finalizing the note.    N

## 2021-06-02 ENCOUNTER — OFFICE VISIT (OUTPATIENT)
Dept: WOUND CARE | Facility: MEDICAL CENTER | Age: 66
End: 2021-06-02
Attending: NURSE PRACTITIONER
Payer: MEDICARE

## 2021-06-02 VITALS
OXYGEN SATURATION: 95 % | SYSTOLIC BLOOD PRESSURE: 139 MMHG | HEART RATE: 76 BPM | TEMPERATURE: 98 F | RESPIRATION RATE: 18 BRPM | DIASTOLIC BLOOD PRESSURE: 88 MMHG

## 2021-06-02 DIAGNOSIS — I87.8 CHRONIC VENOUS STASIS: ICD-10-CM

## 2021-06-02 DIAGNOSIS — T14.8XXA PAIN ASSOCIATED WITH WOUND: ICD-10-CM

## 2021-06-02 DIAGNOSIS — Z91.199 NONCOMPLIANCE: ICD-10-CM

## 2021-06-02 DIAGNOSIS — R52 PAIN ASSOCIATED WITH WOUND: ICD-10-CM

## 2021-06-02 DIAGNOSIS — L08.9 WOUND INFECTION: ICD-10-CM

## 2021-06-02 DIAGNOSIS — M21.962 ACQUIRED DEFORMITY OF LEFT ANKLE AND FOOT: ICD-10-CM

## 2021-06-02 DIAGNOSIS — T14.8XXA WOUND INFECTION: ICD-10-CM

## 2021-06-02 DIAGNOSIS — L97.922 SKIN ULCER OF LEFT LOWER LEG WITH FAT LAYER EXPOSED (HCC): Primary | ICD-10-CM

## 2021-06-02 PROCEDURE — 11045 DBRDMT SUBQ TISS EACH ADDL: CPT | Performed by: NURSE PRACTITIONER

## 2021-06-02 PROCEDURE — 99213 OFFICE O/P EST LOW 20 MIN: CPT | Mod: 25 | Performed by: NURSE PRACTITIONER

## 2021-06-02 PROCEDURE — 99213 OFFICE O/P EST LOW 20 MIN: CPT | Mod: 25

## 2021-06-02 PROCEDURE — 11042 DBRDMT SUBQ TIS 1ST 20SQCM/<: CPT

## 2021-06-02 PROCEDURE — 11045 DBRDMT SUBQ TISS EACH ADDL: CPT

## 2021-06-02 PROCEDURE — 11042 DBRDMT SUBQ TIS 1ST 20SQCM/<: CPT | Performed by: NURSE PRACTITIONER

## 2021-06-02 RX ORDER — CIPROFLOXACIN 500 MG/1
500 TABLET, FILM COATED ORAL 2 TIMES DAILY
Qty: 20 TABLET | Refills: 0 | Status: SHIPPED | OUTPATIENT
Start: 2021-06-02 | End: 2021-06-12

## 2021-06-02 ASSESSMENT — ENCOUNTER SYMPTOMS
SHORTNESS OF BREATH: 0
CLAUDICATION: 0
WHEEZING: 0
VOMITING: 0
MYALGIAS: 0
CHILLS: 0
FEVER: 0
COUGH: 0
DIZZINESS: 0
NAUSEA: 0
PALPITATIONS: 0
DEPRESSION: 1
HEADACHES: 0
WEAKNESS: 0

## 2021-06-02 NOTE — PATIENT INSTRUCTIONS
-Keep dressings clean and dry. Change dressings if they become over saturated, soiled or fall off.     -Avoid prolonged standing or sitting without elevating your legs.    -Remove your compression garments if you have severe pain, severe swelling, numbness, color change, or temperature change in your toes. If you need to remove your compression garments, do so by unrolling them. Do not cut the compression garments off, this is to prevent cutting yourself on accident.    -Should you experience any significant changes in your wound(s), such as signs of infection (redness, swelling, localized heat, increased pain, fever > 101 F, chills) or have any questions regarding your home care instructions, please contact the wound center at [phone]. If after hours, contact your primary care physician or go to the hospital emergency room.

## 2021-06-02 NOTE — PROGRESS NOTES
Provider Encounter- Full Thickness wound    HISTORY OF PRESENT ILLNESS  Wound History:    START OF CARE IN CLINIC: 5/20/2020               REFERRING PROVIDER: KEV Keith                 WOUND ETIOLOGY: venous,  likely mixed etiology              LOCATION: Left lower leg, multiple wounds                                   Right lower leg, multiple wounds-first observed in clinic on initial evaluation, 6/5/2020.  Resolved 9/2/2020                HISTORY: Patient is very well-known to this clinic from previous treatment of wounds to his left lower extremity.  He was discharged from the clinic in March 2020 due to full resolution of his wound.  He returned to the clinic approximately 3 weeks later, on 4/16, with cellulitis, edema, and large open wounds to his left lower leg, and was sent directly to [facility] emergency room.  He was admitted for IV antibiotics and surgical intervention.  On 4/22/2020, he underwent an I&D of his left lower extremity, fasciotomy, and VAC placement.  In the following weeks he underwent surgery 3 more times for irrigation and debridement.  He was discharged home on 5/21, on the VAC, with home health and a referral to [facility].      4/21/2021: Patient was recently discharged from [facility] following being sent from the wound care clinic.  There the patient received IV antibiotics and underwent arterial imaging.  Patient returns to [facility] for care of bilateral lower extremity wounds.      Pertinent Medical History:  Anxiety, Charcot's joint of left foot, non-diabetic (3/21/2016), Chronic congestive heart failure (HCC) (11/16/2017), Hepatitis C, chronic ([facility]), Hypertension, Migraine, Polysubstance abuse ([facility]) (3/8/2018), Tobacco use (4/18/2016), Ulcer of left lower extremity with necrosis of muscle ([facility]) (3/21/2016), and Venous stasis ulcer ([facility]) (2017).                ETIOLOGY HISTORY:  Vascular Surgeon: Dr. White. Compression Circ-aid. Varicose Veins none  visible     TOBACCO USE: Patient denies; patient also denies alcohol and recreational drug use    Patient's problem list, allergies, and current medications reviewed and updated in Epic    Interval History:  Interval History thinned 2/10/2021.  Please see previous notes for complete interval history.       4/21/2021: Clinic visit with KEV Luna. Patient states that they are feeling well today.  Patient denies fever, chills, nausea, vomiting, lightheadedness, dizziness, shortness of breath and chest pain.     4/28/2021: Clinic visit with KEV Luna. Patient states that they are feeling well today.  Patient denies fever, chills, nausea, vomiting, lightheadedness, dizziness, shortness of breath and chest pain.  Patient reports that home health came on Friday and Saturday of last week and not the 4 times as ordered.  Patient also reports that he took a shower every day as instructed.  Later in the conversation the patient stated that he only took a shower on the days that home health came which is not every day.  We also called home health who went to the patient's home all 4 times as ordered however the patient did not answer the door.  Home health left bandages for patient to dress his wound.  Patient reports no bandages were left.  Patient is noncompliant with plan of care and noncompliant with home health assistance.  Patient's wounds are filled with Pseudomonas new wounds to the right lower extremity.    5/5/2021 : Clinic visit with KEV Brantley, RYAN, DONNIEN, CFBARBER.  Kodi states he is feeling well overall, denies fevers, chills, nausea, vomiting, cough or shortness of breath.  He has been taking his Cipro as prescribed, states he has 2 more pills left, will be completing these tomorrow morning.  Home health continues to see him daily during the week for dressing changes.  His daughter is changing his dressing on the weekends.  Is also been showering daily washing his  legs.    5/12/2021: Clinic visit with KEV Luna. Patient states that they are feeling well today.  Patient denies fever, chills, nausea, vomiting, lightheadedness, dizziness, shortness of breath and chest pain.  Patient reports that he has been taking showers daily as instructed.  Patient's pseudomonal load has significantly decreased left lower extremity is improving wounds are more shallow.  Wound to right lower extremity improving.    5/19/2021: Clinic visit with KEV Luna. Patient states that they are feeling well today.  Patient denies fever, chills, nausea, vomiting, lightheadedness, dizziness, shortness of breath and chest pain. Patients wound beds have improved with minimal pseudomonal levels.  Patient reports that he is showering every day.  The quality of the tissue has also improved a great deal with the largest wound bed decreasing in dimensions.  Continue current plan of care.    5/28/2021 : Clinic visit with Dr. Ayon.  Returns for a follow-up visit has questions if there is any soap that we recommend advised him not to use antibacterial soap but good gentle soap and lots of water.  He denies chills fever headache cough chest pain abdominal pain or other acute medical issues.    6/2/2021: Clinic visit with KEV Luna. Patient states that they are feeling well today.  Patient denies fever, chills, nausea, vomiting, lightheadedness, dizziness, shortness of breath and chest pain.  Patient's pseudomonal infection is worsening.  Patient is known to be chronically colonized.  Patient's wounds are deteriorating.  Patient has new wounds developing.  Therefore I have ordered the patient a 10-day course of ciprofloxacin.  Patient is to continue to take a shower daily with soap and water.  Rolled edges taken down in clinic today to the large left lateral wound.    REVIEW OF SYSTEMS:   Review of Systems   Constitutional: Negative for chills and fever.        States he is  eating well, appetite normal   Respiratory: Negative for cough, shortness of breath and wheezing.    Cardiovascular: Positive for leg swelling. Negative for chest pain, palpitations and claudication.        Chronic swelling in legs, for several years   Gastrointestinal: Negative for nausea and vomiting.   Musculoskeletal: Positive for joint pain. Negative for myalgias.        Chronic   Skin:        Multiple diffuse open wounds to left lower extremity a few new open wounds to right lower extremity.  Pseudomonal drainage to bilateral lower extremities.    Neurological: Negative for dizziness, weakness and headaches.   Psychiatric/Behavioral: Positive for depression.       PHYSICAL EXAMINATION:   /88   Pulse 76   Temp 36.7 °C (98 °F) (Temporal)   Resp 18   SpO2 95%     Physical Exam  Constitutional:       General: He is not in acute distress.     Appearance: He is not diaphoretic.   HENT:      Head: Normocephalic and atraumatic.   Eyes:      Conjunctiva/sclera: Conjunctivae normal.      Pupils: Pupils are equal, round, and reactive to light.   Pulmonary:      Effort: Pulmonary effort is normal. No respiratory distress.      Breath sounds: No wheezing.   Musculoskeletal:         General: Deformity present. Normal range of motion.      Cervical back: Neck supple.      Comments: Left charcot foot and ankle     Skin:     General: Skin is warm.      Comments: Large full-thickness wounds to lower extremities  Patient colonized with Pseudomonas to bilateral lower extremities.    Neurological:      Mental Status: He is alert and oriented to person, place, and time.   Psychiatric:         Mood and Affect: Mood and affect normal.         Judgment: Judgment normal.                                                           WOUND ASSESSMENT            Wound 06/05/20 Full Thickness Wound Leg Left --Left Lateral LE (Active)   Wound Image    06/02/21 1327   Site Assessment Yellow 06/02/21 1327   Periwound Assessment  Denuded;Maceration;Edema;Fragile;Scar tissue 06/02/21 1327   Margins Epibole (rolled edges) 06/02/21 1327   Closure Secondary intention 01/20/21 1300   Drainage Amount Copious 06/02/21 1327   Drainage Description Green;Yellow 06/02/21 1327   Treatments Cleansed;Topical Lidocaine;Provider debridement 06/02/21 1327   Wound Cleansing Puracyn [place] 06/02/21 1327   Periwound Protectant TAC Ointment;Barrier Paste 06/02/21 1327   Dressing Cleansing/Solutions Not Applicable 06/02/21 1327   Dressing Options Hydrofiber Silver;Absorbent Abdominal Pad;Dry Roll Gauze;Hypafix Tape;Tubigrip 06/02/21 1327   Dressing Changed Changed 05/19/21 1000   Dressing Status Clean;Dry;Intact 04/28/21 1400   Dressing Change/Treatment Frequency Daily, and As Needed 05/19/21 1000   Non-staged Wound Description Full thickness 06/02/21 1327   Wound Length (cm) 16.1 cm 06/02/21 1327   Wound Width (cm) 5.3 cm 06/02/21 1327   Wound Depth (cm) 0.2 cm 06/02/21 1327   Wound Surface Area (cm^2) 85.33 cm^2 06/02/21 1327   Wound Volume (cm^3) 17.07 cm^3 06/02/21 1327   Post-Procedure Length (cm) 16.6 cm 05/28/21 1100   Post-Procedure Width (cm) 5.1 cm 05/28/21 1100   Post-Procedure Depth (cm) 0.3 cm 05/28/21 1100   Post-Procedure Surface Area (cm^2) 84.66 cm^2 05/28/21 1100   Post-Procedure Volume (cm^3) 25.4 cm^3 05/28/21 1100   Wound Healing % 69 06/02/21 1327   Wound Bed Granulation (%) 40 % 05/28/21 1100   Wound Bed Epithelium (%) 90 % 02/10/21 1300   Wound Bed Slough (%) 60 % 05/28/21 1100   Wound Bed Eschar (%) 0 % 01/13/21 1345   Wound Bed Granulation (%) - Post-Procedure 100 % 05/19/21 1000   Tunneling (cm) 0 cm 05/28/21 1100   Undermining (cm) 0 cm 05/28/21 1100   Wound Odor None 05/28/21 1100   Exposed Structures None 05/28/21 1100       Wound 01/20/21 --Left Medial/Posterior/Lateral LE (Active)   Wound Image    06/02/21 1327   Site Assessment Red;Yellow 06/02/21 1327   Periwound Assessment Denuded;Maceration;Edema;Fragile;Satellite lesions  06/02/21 1327   Margins Unattached edges;Epibole (rolled edges) 06/02/21 1327   Closure Secondary intention 02/24/21 1400   Drainage Amount Copious 06/02/21 1327   Drainage Description Yellow;Green 06/02/21 1327   Treatments Cleansed;Topical Lidocaine;Provider debridement 06/02/21 1327   Wound Cleansing Puracyn [place] 06/02/21 1327   Periwound Protectant TAC Ointment;Barrier Paste 06/02/21 1327   Dressing Cleansing/Solutions Not Applicable 06/02/21 1327   Dressing Options Hydrofiber Silver;Absorbent Abdominal Pad;Dry Roll Gauze;Hypafix Tape;Tubigrip 06/02/21 1327   Dressing Changed Changed 05/19/21 1000   Dressing Status Clean;Dry;Intact 02/24/21 1400   Dressing Change/Treatment Frequency Daily, and As Needed 05/19/21 1000   Non-staged Wound Description Full thickness 06/02/21 1327   Wound Length (cm) 17 cm 06/02/21 1327   Wound Width (cm) 16 cm 06/02/21 1327   Wound Depth (cm) 0.5 cm 06/02/21 1327   Wound Surface Area (cm^2) 272 cm^2 06/02/21 1327   Wound Volume (cm^3) 136 cm^3 06/02/21 1327   Post-Procedure Length (cm) 17 cm 06/02/21 1327   Post-Procedure Width (cm) 16 cm 06/02/21 1327   Post-Procedure Depth (cm) 0.6 cm 06/02/21 1327   Post-Procedure Surface Area (cm^2) 272 cm^2 06/02/21 1327   Post-Procedure Volume (cm^3) 163.2 cm^3 06/02/21 1327   Wound Healing % -17330 06/02/21 1327   Wound Bed Granulation (%) 70 % 05/28/21 1100   Wound Bed Epithelium (%) 0 % 02/10/21 1300   Wound Bed Slough (%) 75 % 06/02/21 1327   Wound Bed Eschar (%) 0 % 02/10/21 1300   Wound Bed Granulation (%) - Post-Procedure 25 % 06/02/21 1327   Tunneling (cm) 0 cm 06/02/21 1327   Undermining (cm) 0 cm 06/02/21 1327   Wound Odor Foul 06/02/21 1327   Exposed Structures None 06/02/21 1327       Wound 04/11/21 Leg Lower Right (Active)       Wound 04/11/21 Leg Lower Left (Active)       Wound 04/11/21 Heel Right DTI (Active)       Wound 04/28/21 --Right Medial LE (Active)   Wound Image    06/02/21 1327   Site Assessment Red;Yellow 06/02/21  1327   Periwound Assessment Dry;Edema 06/02/21 1327   Margins Attached edges 06/02/21 1327   Drainage Amount Moderate 06/02/21 1327   Drainage Description Serosanguineous 06/02/21 1327   Treatments Cleansed;Topical Lidocaine;Provider debridement 06/02/21 1327   Wound Cleansing Puracyn [place] 06/02/21 1327   Periwound Protectant Barrier Paste 06/02/21 1327   Dressing Cleansing/Solutions Not Applicable 06/02/21 1327   Dressing Options Hydrofiber Silver;Nonadhesive Foam;Dry Roll Gauze;Hypafix Tape;Tubigrip 06/02/21 1327   Dressing Changed Changed 05/19/21 1000   Dressing Status Clean;Dry;Intact 04/28/21 1400   Dressing Change/Treatment Frequency Every 72 hrs, and As Needed 05/19/21 1000   Non-staged Wound Description Full thickness 06/02/21 1327   Wound Length (cm) 1 cm 06/02/21 1327   Wound Width (cm) 1.1 cm 06/02/21 1327   Wound Depth (cm) 0.2 cm 06/02/21 1327   Wound Surface Area (cm^2) 1.1 cm^2 06/02/21 1327   Wound Volume (cm^3) 0.22 cm^3 06/02/21 1327   Post-Procedure Length (cm) 1.2 cm 06/02/21 1327   Post-Procedure Width (cm) 1.2 cm 06/02/21 1327   Post-Procedure Depth (cm) 0.2 cm 06/02/21 1327   Post-Procedure Surface Area (cm^2) 1.44 cm^2 06/02/21 1327   Post-Procedure Volume (cm^3) 0.29 cm^3 06/02/21 1327   Wound Healing % 61 06/02/21 1327   Wound Bed Granulation (%) 80 % 05/19/21 1000   Wound Bed Slough (%) 75 % 06/02/21 1327   Wound Bed Granulation (%) - Post-Procedure 100 % 05/19/21 1000   Wound Bed Slough % - (Post-Procedure) 25 % 06/02/21 1327   Tunneling (cm) 0 cm 06/02/21 1327   Undermining (cm) 0 cm 06/02/21 1327   Wound Odor Foul 06/02/21 1327   Exposed Structures None 06/02/21 1327       Wound 05/19/21 Venous Ulcer Ankle Medial Right R medial ankle CVI ulcer FTW (Active)   Wound Image    06/02/21 1327   Site Assessment Red;Yellow 06/02/21 1327   Periwound Assessment Edema;Fragile 06/02/21 1327   Margins Attached edges 06/02/21 1327   Closure Secondary intention 05/19/21 1000   Drainage Amount  Moderate 06/02/21 1327   Drainage Description Serosanguineous 06/02/21 1327   Treatments Cleansed;Topical Lidocaine;Provider debridement 06/02/21 1327   Wound Cleansing Puracyn [place] 06/02/21 1327   Periwound Protectant Barrier Paste 06/02/21 1327   Dressing Cleansing/Solutions Not Applicable 06/02/21 1327   Dressing Options Hydrofiber Silver;Nonadhesive Foam;Dry Roll Gauze;Hypafix Tape;Tubigrip 06/02/21 1327   Dressing Changed New 05/19/21 1000   Dressing Status Clean;Dry;Intact 05/19/21 1000   Dressing Change/Treatment Frequency Daily, and As Needed 05/19/21 1000   Non-staged Wound Description Full thickness 06/02/21 1327   Wound Length (cm) 2 cm 06/02/21 1327   Wound Width (cm) 0.8 cm 06/02/21 1327   Wound Depth (cm) 0.1 cm 06/02/21 1327   Wound Surface Area (cm^2) 1.6 cm^2 06/02/21 1327   Wound Volume (cm^3) 0.16 cm^3 06/02/21 1327   Post-Procedure Length (cm) 2.2 cm 06/02/21 1327   Post-Procedure Width (cm) 0.8 cm 06/02/21 1327   Post-Procedure Depth (cm) 0.2 cm 06/02/21 1327   Post-Procedure Surface Area (cm^2) 1.76 cm^2 06/02/21 1327   Post-Procedure Volume (cm^3) 0.35 cm^3 06/02/21 1327   Wound Healing % -100 06/02/21 1327   Wound Bed Granulation (%) 80 % 05/19/21 1000   Wound Bed Slough (%) 50 % 06/02/21 1327   Wound Bed Granulation (%) - Post-Procedure 100 % 05/19/21 1000   Wound Bed Slough % - (Post-Procedure) 0 % 06/02/21 1327   Tunneling (cm) 0 cm 06/02/21 1327   Undermining (cm) 0 cm 06/02/21 1327   Wound Odor Foul 06/02/21 1327   Exposed Structures None 06/02/21 1327       Wound 05/28/21 LLE Anterior Proximal (Active)   Wound Image    06/02/21 1327   Site Assessment Pink;Yellow 06/02/21 1327   Periwound Assessment Edema;Fragile 06/02/21 1327   Margins Attached edges 06/02/21 1327   Drainage Amount Moderate 06/02/21 1327   Drainage Description Yellow;Serosanguineous 06/02/21 1327   Treatments Cleansed;Topical Lidocaine;Provider debridement 06/02/21 1327   Wound Cleansing Puracyn [place] 06/02/21 1327    Periwound Protectant TAC Ointment;Barrier Paste 06/02/21 1327   Dressing Cleansing/Solutions Not Applicable 06/02/21 1327   Dressing Options Hydrofiber Silver;Nonadhesive Foam;Dry Roll Gauze;Hypafix Tape;Tubigrip 06/02/21 1327   Non-staged Wound Description Full thickness 06/02/21 1327   Wound Length (cm) 3.1 cm 06/02/21 1327   Wound Width (cm) 4.5 cm 06/02/21 1327   Wound Depth (cm) 0.1 cm 06/02/21 1327   Wound Surface Area (cm^2) 13.95 cm^2 06/02/21 1327   Wound Volume (cm^3) 1.4 cm^3 06/02/21 1327   Post-Procedure Length (cm) 3.7 cm 06/02/21 1327   Post-Procedure Width (cm) 5 cm 06/02/21 1327   Post-Procedure Depth (cm) 0.1 cm 06/02/21 1327   Post-Procedure Surface Area (cm^2) 18.5 cm^2 06/02/21 1327   Post-Procedure Volume (cm^3) 1.85 cm^3 06/02/21 1327   Wound Healing % -312 06/02/21 1327   Wound Bed Slough (%) 10 % 06/02/21 1327   Wound Bed Slough % - (Post-Procedure) 0 % 06/02/21 1327   Tunneling (cm) 0 cm 06/02/21 1327   Undermining (cm) 0 cm 06/02/21 1327   Wound Odor Foul 06/02/21 1327   Exposed Structures None 06/02/21 1327       Wound 05/28/21 LLE Medial Proximal (Active)   Wound Image    06/02/21 1327   Site Assessment Red;Yellow 06/02/21 1327   Periwound Assessment Edema;Fragile 06/02/21 1327   Margins Attached edges 06/02/21 1327   Drainage Amount Moderate 06/02/21 1327   Drainage Description Serosanguineous 06/02/21 1327   Treatments Cleansed;Topical Lidocaine;Provider debridement 06/02/21 1327   Wound Cleansing Puracyn [place] 06/02/21 1327   Periwound Protectant TAC Ointment;Barrier Paste 06/02/21 1327   Dressing Cleansing/Solutions Not Applicable 06/02/21 1327   Dressing Options Hydrofiber Silver;Nonadhesive Foam;Dry Roll Gauze;Hypafix Tape;Tubigrip 06/02/21 1327   Non-staged Wound Description Full thickness 06/02/21 1327   Wound Length (cm) 3.5 cm 06/02/21 1327   Wound Width (cm) 0.5 cm 06/02/21 1327   Wound Depth (cm) 0.1 cm 06/02/21 1327   Wound Surface Area (cm^2) 1.75 cm^2 06/02/21 1327    Wound Volume (cm^3) 0.18 cm^3 21   Post-Procedure Length (cm) 3.5 cm 21   Post-Procedure Width (cm) 0.6 cm 21   Post-Procedure Depth (cm) 0.1 cm 21   Post-Procedure Surface Area (cm^2) 2.1 cm^2 21   Post-Procedure Volume (cm^3) 0.21 cm^3 21   Wound Healing % -800 21   Wound Bed Slough (%) 5 % 21   Wound Bed Slough % - (Post-Procedure) 0 % 21   Tunneling (cm) 0 cm 21   Undermining (cm) 0 cm 21   Wound Odor Foul 21   Exposed Structures None 21                    PROCEDURE: Excisional debridement of left lower extremity wounds   -2% viscous lidocaine applied topically to wound beds for approximately 10 minutes prior to debridement  -4 mm curette used to excise thick layer of slough from all wound beds.  Excisional debridement was performed to remove devitalized tissue from all wounds until healthy, bleeding tissue was visualized.  Total area debrided approximately (100% accuracy of wound measurement difficult due to the diffuse nature of the wounds) 200 cm²  debrided into the subcutaneous layer of all wounds.    -Bleeding controlled with manual pressure.    -Wounds cleansed with normal saline  -Patient tolerated the procedure well, without c/o pain or discomfort.         Pertinent Labs and Diagnostics:    Labs:     A1c:   Lab Results   Component Value Date/Time    HBA1C 5.7 (H) 2020 11:22 AM      Imagin/3/2020-MRI left tibia-fibula with and without contrast  IMPRESSION:     Moderate lateral leg cellulitis and underlying myositis     No abscess or osteomyelitis    VASCULAR STUDIES: BHUPINDER 2019   Right.    Doppler waveform of the common femoral artery is of high amplitude and    triphasic.    Doppler waveforms at the ankle are brisk and triphasic.    Ankle-brachial index is normal.       Left.    Could not perform ankle-brachial index due to large  blisters/ulcers.   Normal toe-brachial index: 0.94   Doppler waveform of the common femoral artery is of high amplitude and    triphasic.    Biphasic waveforms seen at the ankle.     WOUND CULTURE:    11/18/2020: Culture collected in clinic  Culture Result Abnormal   Staphylococcus aureus   Light growth     Culture Result Abnormal   Pseudomonas aeruginosa   Light growth     Culture Result Abnormal   Enterococcus faecalis   Light growth              ASSESSMENT AND PLAN:     1. Skin ulcer of left lower leg with fat layer exposed (HCC)  Comment: Patient has history of recurring ulcerations to left lower extremity.  Has undergone ablation procedures by Dr. White.  Discharged from [facility] in March 2020 due to healing, returned approximately 3 weeks later much worse.  Hospitalized from 4/16 until 5/21/2020 underwent multiple surgical debridements.    06/02/21: Pseudomonal growth has significantly increased to left lower extremity.  See interval history above.    -Excisional debridement of wounds in clinic today, medically necessary to promote wound healing.  Rolled edges taken down to largest wound to left lateral lower extremity  -Patient underwent bilateral lower extremity arterial ultrasound left superficial femoral, profunda femoral, popliteal and tibial artery stenosis 50 to 75%.  No stenosis to the right leg.  There is no plan for arterial intervention at this time per Dr. Rhodes.    -Patient to return to clinic weekly for assessment and debridement  -Home health to change dressing 4 times per week in between clinic visits.  Patient's daughter is to change dressing Saturday and Sunday.  We will change the patient's dressing while in clinic 1 time a week.  This will cover the patient for a dressing change every day.   Patient is to change dressing as needed for saturation.    Wound care: Hydrofiber silver, absorbent abdominal pad, dry roll gauze, Hypafix, Tubigrip D    2. Chronic venous stasis  Comments: Hemosiderin  staining, brawny edema, scarring- findings consistent with CVI.  Pt established at [facility], underwent ablation procedures in 2019.     -Patient was seen by Dr. Rhodes at [facility] no further plans for arterial intervention at this time.     3. Acquired deformity of left ankle and foot  Comments: Charcot deformity of foot and ankle.  Very little ROM of ankle.  Patient was referred to orthopedics previously, however did not make appointment.  -Patient will need to follow-up with orthopedics once wounds are healed.    4.  Wound infection     06/02/21:   Patient historically chronically colonized with Pseudomonas to bilateral lower extremities.  This has increased significantly with new wounds developing.  See interval history.    -I have ordered the patient 10-day course of ciprofloxacin for Pseudomonas to help decrease pseudomonal load  -Patient to shower daily with soap and water dry wound beds thoroughly and reapply dressings daily.  -Continue  home health to 4 times a week for additional dressing changes 1 time a week in clinic and his daughter to change on the weekend.    5.  Pain associated with wound    6/2/2021:  -2% viscous lidocaine applied topically to wound bed for approximately 5 minutes prior to debridement  -Patient tolerated procedure today with no complaints of discomfort.    6.  Noncompliance  Comments: Patient has a history of noncompliance not following prescribed wound care dressings or showering daily.  Patient has not been compliant with antibiotic administration in the past.  However, patient's bilateral lower extremities have improved with significantly less Pseudomonas patient does report that he is following the current plan of care.    > 20 min spent face to face with patient, >50% of time spent counseling, coordinating care, reviewing records, discussing POC, educating patient.  Time spent in excess of procedural time.    Please note that this note may have been created using voice  recognition software. I have worked with technical experts from [facility] to optimize the interface.  I have made every reasonable attempt to correct obvious errors, but there may be errors of grammar and possibly content that I did not discover before finalizing the note.    N

## 2021-06-09 ENCOUNTER — OFFICE VISIT (OUTPATIENT)
Dept: WOUND CARE | Facility: MEDICAL CENTER | Age: 66
End: 2021-06-09
Attending: NURSE PRACTITIONER
Payer: MEDICARE

## 2021-06-09 VITALS
DIASTOLIC BLOOD PRESSURE: 101 MMHG | OXYGEN SATURATION: 97 % | TEMPERATURE: 97.6 F | HEART RATE: 65 BPM | RESPIRATION RATE: 20 BRPM | SYSTOLIC BLOOD PRESSURE: 153 MMHG

## 2021-06-09 DIAGNOSIS — L97.922 SKIN ULCER OF LEFT LOWER LEG WITH FAT LAYER EXPOSED (HCC): Primary | ICD-10-CM

## 2021-06-09 DIAGNOSIS — T14.8XXA PAIN ASSOCIATED WITH WOUND: ICD-10-CM

## 2021-06-09 DIAGNOSIS — I87.8 CHRONIC VENOUS STASIS: ICD-10-CM

## 2021-06-09 DIAGNOSIS — Z91.199 NONCOMPLIANCE: ICD-10-CM

## 2021-06-09 DIAGNOSIS — R52 PAIN ASSOCIATED WITH WOUND: ICD-10-CM

## 2021-06-09 DIAGNOSIS — L08.9 WOUND INFECTION: ICD-10-CM

## 2021-06-09 DIAGNOSIS — M21.962 ACQUIRED DEFORMITY OF LEFT ANKLE AND FOOT: ICD-10-CM

## 2021-06-09 DIAGNOSIS — T14.8XXA WOUND INFECTION: ICD-10-CM

## 2021-06-09 PROCEDURE — 11045 DBRDMT SUBQ TISS EACH ADDL: CPT

## 2021-06-09 PROCEDURE — 11042 DBRDMT SUBQ TIS 1ST 20SQCM/<: CPT

## 2021-06-09 PROCEDURE — 11042 DBRDMT SUBQ TIS 1ST 20SQCM/<: CPT | Performed by: NURSE PRACTITIONER

## 2021-06-09 PROCEDURE — 11045 DBRDMT SUBQ TISS EACH ADDL: CPT | Performed by: NURSE PRACTITIONER

## 2021-06-09 ASSESSMENT — ENCOUNTER SYMPTOMS
NAUSEA: 0
CHILLS: 0
PALPITATIONS: 0
WHEEZING: 0
DEPRESSION: 1
MYALGIAS: 0
COUGH: 0
CLAUDICATION: 0
WEAKNESS: 0
HEADACHES: 0
FEVER: 0
SHORTNESS OF BREATH: 0
DIZZINESS: 0
VOMITING: 0

## 2021-06-09 ASSESSMENT — PAIN SCALES - GENERAL: PAINLEVEL: 4=SLIGHT-MODERATE PAIN

## 2021-06-09 NOTE — PATIENT INSTRUCTIONS
-Keep your wound dressing clean, dry, and intact.    -Change your dressing if it becomes soiled, soaked, or falls off.      - Resolved wound be fragile for a few days, bathe and dry area gently, only ever regains a maximum of 80% of the tensile strength of the surrounding skin, remodeling of scar can continue for 6mo - a year. Contact PCP for a referral back her if any problems with area opening and draining again.    -Should you experience any significant changes in your wound(s), such as infection (redness, swelling, localized heat, increased pain, fever > 101 F, chills) or have any questions regarding your home care instructions, please contact the wound center at [phone]. If after hours, contact your primary care physician or go to the hospital emergency room.

## 2021-06-09 NOTE — PROGRESS NOTES
Provider Encounter- Full Thickness wound    HISTORY OF PRESENT ILLNESS  Wound History:    START OF CARE IN CLINIC: 5/20/2020               REFERRING PROVIDER: KEV Keith                 WOUND ETIOLOGY: venous,  likely mixed etiology              LOCATION: Left lower leg, multiple wounds                                   Right lower leg, multiple wounds-first observed in clinic on initial evaluation, 6/5/2020.  Resolved 9/2/2020                HISTORY: Patient is very well-known to this clinic from previous treatment of wounds to his left lower extremity.  He was discharged from the clinic in March 2020 due to full resolution of his wound.  He returned to the clinic approximately 3 weeks later, on 4/16, with cellulitis, edema, and large open wounds to his left lower leg, and was sent directly to [facility] emergency room.  He was admitted for IV antibiotics and surgical intervention.  On 4/22/2020, he underwent an I&D of his left lower extremity, fasciotomy, and VAC placement.  In the following weeks he underwent surgery 3 more times for irrigation and debridement.  He was discharged home on 5/21, on the VAC, with home health and a referral to [facility].      4/21/2021: Patient was recently discharged from [facility] following being sent from the wound care clinic.  There the patient received IV antibiotics and underwent arterial imaging.  Patient returns to [facility] for care of bilateral lower extremity wounds.      Pertinent Medical History:  Anxiety, Charcot's joint of left foot, non-diabetic (3/21/2016), Chronic congestive heart failure (HCC) (11/16/2017), Hepatitis C, chronic ([facility]), Hypertension, Migraine, Polysubstance abuse ([facility]) (3/8/2018), Tobacco use (4/18/2016), Ulcer of left lower extremity with necrosis of muscle ([facility]) (3/21/2016), and Venous stasis ulcer ([facility]) (2017).                ETIOLOGY HISTORY:  Vascular Surgeon: Dr. White. Compression Circ-aid. Varicose Veins none  visible     TOBACCO USE: Patient denies; patient also denies alcohol and recreational drug use    Patient's problem list, allergies, and current medications reviewed and updated in Epic    Interval History:  Interval History thinned 2/10/2021.  Please see previous notes for complete interval history.       4/21/2021: Clinic visit with KEV Luna. Patient states that they are feeling well today.  Patient denies fever, chills, nausea, vomiting, lightheadedness, dizziness, shortness of breath and chest pain.     4/28/2021: Clinic visit with KEV Luna. Patient states that they are feeling well today.  Patient denies fever, chills, nausea, vomiting, lightheadedness, dizziness, shortness of breath and chest pain.  Patient reports that home health came on Friday and Saturday of last week and not the 4 times as ordered.  Patient also reports that he took a shower every day as instructed.  Later in the conversation the patient stated that he only took a shower on the days that home health came which is not every day.  We also called home health who went to the patient's home all 4 times as ordered however the patient did not answer the door.  Home health left bandages for patient to dress his wound.  Patient reports no bandages were left.  Patient is noncompliant with plan of care and noncompliant with home health assistance.  Patient's wounds are filled with Pseudomonas new wounds to the right lower extremity.    5/5/2021 : Clinic visit with KEV Brantley, RYAN, DONNIEN, CFBARBER.  Kodi states he is feeling well overall, denies fevers, chills, nausea, vomiting, cough or shortness of breath.  He has been taking his Cipro as prescribed, states he has 2 more pills left, will be completing these tomorrow morning.  Home health continues to see him daily during the week for dressing changes.  His daughter is changing his dressing on the weekends.  Is also been showering daily washing his  legs.    5/12/2021: Clinic visit with KEV Luna. Patient states that they are feeling well today.  Patient denies fever, chills, nausea, vomiting, lightheadedness, dizziness, shortness of breath and chest pain.  Patient reports that he has been taking showers daily as instructed.  Patient's pseudomonal load has significantly decreased left lower extremity is improving wounds are more shallow.  Wound to right lower extremity improving.    5/19/2021: Clinic visit with KEV Luna. Patient states that they are feeling well today.  Patient denies fever, chills, nausea, vomiting, lightheadedness, dizziness, shortness of breath and chest pain. Patients wound beds have improved with minimal pseudomonal levels.  Patient reports that he is showering every day.  The quality of the tissue has also improved a great deal with the largest wound bed decreasing in dimensions.  Continue current plan of care.    5/28/2021 : Clinic visit with Dr. Ayon.  Returns for a follow-up visit has questions if there is any soap that we recommend advised him not to use antibacterial soap but good gentle soap and lots of water.  He denies chills fever headache cough chest pain abdominal pain or other acute medical issues.    6/2/2021: Clinic visit with KEV Luna. Patient states that they are feeling well today.  Patient denies fever, chills, nausea, vomiting, lightheadedness, dizziness, shortness of breath and chest pain.  Patient's pseudomonal infection is worsening.  Patient is known to be chronically colonized.  Patient's wounds are deteriorating.  Patient has new wounds developing.  Therefore I have ordered the patient a 10-day course of ciprofloxacin.  Patient is to continue to take a shower daily with soap and water.  Rolled edges taken down in clinic today to the large left lateral wound.    6/9/2021: Clinic visit with KEV Luna. Patient states that they are feeling well today.  Patient  denies fever, chills, nausea, vomiting, lightheadedness, dizziness, shortness of breath and chest pain.  Patient's wounds are becoming more fibrotic in nature.  Patient has lost granulation tissue to the superior left lower lateral wound.  Patient is finishing up his course of ciprofloxacin patient should finish course on on 6/12/2020.    REVIEW OF SYSTEMS:   Review of Systems   Constitutional: Negative for chills and fever.        States he is eating well, appetite normal   Respiratory: Negative for cough, shortness of breath and wheezing.    Cardiovascular: Positive for leg swelling. Negative for chest pain, palpitations and claudication.        Chronic swelling in legs, for several years   Gastrointestinal: Negative for nausea and vomiting.   Musculoskeletal: Positive for joint pain. Negative for myalgias.        Chronic   Skin:        Multiple diffuse open wounds to left lower extremity a few new open wounds to right lower extremity.  Pseudomonal drainage to bilateral lower extremities.    Neurological: Negative for dizziness, weakness and headaches.   Psychiatric/Behavioral: Positive for depression.       PHYSICAL EXAMINATION:   /101   Pulse 65   Temp 36.4 °C (97.6 °F) (Temporal)   Resp 20   SpO2 97%     Physical Exam  Constitutional:       General: He is not in acute distress.     Appearance: He is not diaphoretic.   HENT:      Head: Normocephalic and atraumatic.   Eyes:      Conjunctiva/sclera: Conjunctivae normal.      Pupils: Pupils are equal, round, and reactive to light.   Pulmonary:      Effort: Pulmonary effort is normal. No respiratory distress.      Breath sounds: No wheezing.   Musculoskeletal:         General: Deformity present. Normal range of motion.      Cervical back: Neck supple.      Comments: Left charcot foot and ankle     Skin:     General: Skin is warm.      Comments: Large full-thickness wounds to lower extremities  Patient colonized with Pseudomonas to bilateral lower  extremities.    Neurological:      Mental Status: He is alert and oriented to person, place, and time.   Psychiatric:         Mood and Affect: Mood and affect normal.         Judgment: Judgment normal.                                                           WOUND ASSESSMENT           Wound 06/05/20 Full Thickness Wound Leg Left --Left Lateral LE (Active)   Wound Image    06/09/21 1300   Site Assessment Red;Yellow 06/09/21 1300   Periwound Assessment Denuded;Maceration;Edema;Fragile;Scar tissue 06/09/21 1300   Margins Epibole (rolled edges) 06/09/21 1300   Closure Secondary intention 01/20/21 1300   Drainage Amount Large 06/09/21 1300   Drainage Description Serosanguineous 06/09/21 1300   Treatments Cleansed;Topical Lidocaine;Provider debridement 06/09/21 1300   Wound Cleansing Puracyn [place] 06/09/21 1300   Periwound Protectant TAC Ointment 06/09/21 1300   Dressing Cleansing/Solutions Not Applicable 06/09/21 1300   Dressing Options Hydrofiber Silver;Absorbent Abdominal Pad;Soft Conforming Roll;Tubigrip 06/09/21 1300   Dressing Changed Changed 05/19/21 1000   Dressing Status Clean;Dry;Intact 04/28/21 1400   Dressing Change/Treatment Frequency Daily, and As Needed 05/19/21 1000   Non-staged Wound Description Full thickness 06/09/21 1300   Wound Length (cm) 15.5 cm 06/09/21 1300   Wound Width (cm) 5.5 cm 06/09/21 1300   Wound Depth (cm) 0.2 cm 06/09/21 1300   Wound Surface Area (cm^2) 85.25 cm^2 06/09/21 1300   Wound Volume (cm^3) 17.05 cm^3 06/09/21 1300   Post-Procedure Length (cm) 15.7 cm 06/09/21 1300   Post-Procedure Width (cm) 5.6 cm 06/09/21 1300   Post-Procedure Depth (cm) 0.3 cm 06/09/21 1300   Post-Procedure Surface Area (cm^2) 87.92 cm^2 06/09/21 1300   Post-Procedure Volume (cm^3) 26.38 cm^3 06/09/21 1300   Wound Healing % 69 06/09/21 1300   Wound Bed Granulation (%) 40 % 05/28/21 1100   Wound Bed Epithelium (%) 90 % 02/10/21 1300   Wound Bed Slough (%) 100 % 06/02/21 1327   Wound Bed Eschar (%) 0 %  01/13/21 1345   Wound Bed Granulation (%) - Post-Procedure 100 % 05/19/21 1000   Wound Bed Slough % - (Post-Procedure) 25 % 06/02/21 1327   Tunneling (cm) 0 cm 06/09/21 1300   Undermining (cm) 0 cm 06/09/21 1300   Wound Odor None 06/09/21 1300   Exposed Structures None 06/09/21 1300       Wound 01/20/21 --Left Medial/Posterior/Lateral LE (Active)   Wound Image    06/09/21 1300   Site Assessment Red;Yellow 06/09/21 1300   Periwound Assessment Denuded;Maceration;Edema;Fragile;Satellite lesions 06/09/21 1300   Margins Unattached edges;Epibole (rolled edges) 06/09/21 1300   Closure Secondary intention 02/24/21 1400   Drainage Amount Large 06/09/21 1300   Drainage Description Serosanguineous 06/09/21 1300   Treatments Cleansed;Topical Lidocaine;Provider debridement 06/09/21 1300   Wound Cleansing Puracyn [place] 06/09/21 1300   Periwound Protectant TAC Ointment 06/09/21 1300   Dressing Cleansing/Solutions Not Applicable 06/09/21 1300   Dressing Options Hydrofiber Silver;Absorbent Abdominal Pad;Soft Conforming Roll;Tubigrip 06/09/21 1300   Dressing Changed Changed 05/19/21 1000   Dressing Status Clean;Dry;Intact 02/24/21 1400   Dressing Change/Treatment Frequency Daily, and As Needed 05/19/21 1000   Non-staged Wound Description Full thickness 06/09/21 1300   Wound Length (cm) 16 cm 06/09/21 1300   Wound Width (cm) 15.5 cm 06/09/21 1300   Wound Depth (cm) 0.5 cm 06/09/21 1300   Wound Surface Area (cm^2) 248 cm^2 06/09/21 1300   Wound Volume (cm^3) 124 cm^3 06/09/21 1300   Post-Procedure Length (cm) 16.2 cm 06/09/21 1300   Post-Procedure Width (cm) 15.6 cm 06/09/21 1300   Post-Procedure Depth (cm) 0.5 cm 06/09/21 1300   Post-Procedure Surface Area (cm^2) 252.72 cm^2 06/09/21 1300   Post-Procedure Volume (cm^3) 126.36 cm^3 06/09/21 1300   Wound Healing % -66548 06/09/21 1300   Wound Bed Granulation (%) 70 % 05/28/21 1100   Wound Bed Epithelium (%) 0 % 02/10/21 1300   Wound Bed Slough (%) 75 % 06/02/21 1327   Wound Bed Eschar  (%) 0 % 02/10/21 1300   Wound Bed Granulation (%) - Post-Procedure 25 % 06/02/21 1327   Tunneling (cm) 0 cm 06/09/21 1300   Undermining (cm) 0 cm 06/09/21 1300   Wound Odor None 06/09/21 1300   Exposed Structures None 06/09/21 1300       Wound 04/11/21 Leg Lower Right (Active)       Wound 04/11/21 Leg Lower Left (Active)       Wound 04/11/21 Heel Right DTI (Active)       Wound 04/28/21 --Right Medial LE (Active)   Wound Image    06/09/21 1300   Site Assessment Red;Yellow 06/09/21 1300   Periwound Assessment Dry;Edema 06/09/21 1300   Margins Attached edges 06/09/21 1300   Drainage Amount Small 06/09/21 1300   Drainage Description Serosanguineous 06/09/21 1300   Treatments Cleansed;Topical Lidocaine;Provider debridement 06/09/21 1300   Wound Cleansing Puracyn [place] 06/09/21 1300   Periwound Protectant Barrier Paste 06/09/21 1300   Dressing Cleansing/Solutions Not Applicable 06/09/21 1300   Dressing Options Hydrofiber Silver;Absorbent Abdominal Pad;Soft Conforming Roll;Tubigrip 06/09/21 1300   Dressing Changed Changed 06/09/21 1300   Dressing Status Clean;Dry;Intact 04/28/21 1400   Dressing Change/Treatment Frequency Daily, and As Needed 06/09/21 1300   Non-staged Wound Description Full thickness 06/09/21 1300   Wound Length (cm) 1.1 cm 06/09/21 1300   Wound Width (cm) 0.6 cm 06/09/21 1300   Wound Depth (cm) 0.1 cm 06/09/21 1300   Wound Surface Area (cm^2) 0.66 cm^2 06/09/21 1300   Wound Volume (cm^3) 0.07 cm^3 06/09/21 1300   Post-Procedure Length (cm) 1.1 cm 06/09/21 1300   Post-Procedure Width (cm) 0.7 cm 06/09/21 1300   Post-Procedure Depth (cm) 0.1 cm 06/09/21 1300   Post-Procedure Surface Area (cm^2) 0.77 cm^2 06/09/21 1300   Post-Procedure Volume (cm^3) 0.08 cm^3 06/09/21 1300   Wound Healing % 88 06/09/21 1300   Wound Bed Granulation (%) 80 % 05/19/21 1000   Wound Bed Slough (%) 75 % 06/02/21 1327   Wound Bed Granulation (%) - Post-Procedure 100 % 05/19/21 1000   Wound Bed Slough % - (Post-Procedure) 25 %  06/02/21 1327   Tunneling (cm) 0 cm 06/09/21 1300   Undermining (cm) 0 cm 06/09/21 1300   Wound Odor None 06/09/21 1300   Exposed Structures None 06/09/21 1300       Wound 05/19/21 Venous Ulcer Ankle Medial Right R medial ankle CVI ulcer FTW (Active)   Wound Image    06/09/21 1300   Site Assessment Red;Yellow 06/09/21 1300   Periwound Assessment Edema;Fragile 06/09/21 1300   Margins Attached edges 06/09/21 1300   Closure Secondary intention 05/19/21 1000   Drainage Amount Moderate 06/09/21 1300   Drainage Description Serosanguineous 06/09/21 1300   Treatments Cleansed;Topical Lidocaine;Provider debridement 06/09/21 1300   Wound Cleansing Puracyn [place] 06/09/21 1300   Periwound Protectant TAC Ointment 06/09/21 1300   Dressing Cleansing/Solutions Not Applicable 06/09/21 1300   Dressing Options Hydrofiber Silver;Absorbent Abdominal Pad;Soft Conforming Roll;Tubigrip 06/09/21 1300   Dressing Changed Changed 06/09/21 1300   Dressing Status Clean;Dry;Intact 06/09/21 1300   Dressing Change/Treatment Frequency Daily, and As Needed 06/09/21 1300   Non-staged Wound Description Full thickness 06/09/21 1300   Wound Length (cm) 2.1 cm 06/09/21 1300   Wound Width (cm) 0.8 cm 06/09/21 1300   Wound Depth (cm) 0.1 cm 06/09/21 1300   Wound Surface Area (cm^2) 1.68 cm^2 06/09/21 1300   Wound Volume (cm^3) 0.17 cm^3 06/09/21 1300   Post-Procedure Length (cm) 2.1 cm 06/09/21 1300   Post-Procedure Width (cm) 0.8 cm 06/09/21 1300   Post-Procedure Depth (cm) 0.1 cm 06/09/21 1300   Post-Procedure Surface Area (cm^2) 1.68 cm^2 06/09/21 1300   Post-Procedure Volume (cm^3) 0.17 cm^3 06/09/21 1300   Wound Healing % -112 06/09/21 1300   Wound Bed Granulation (%) 80 % 05/19/21 1000   Wound Bed Slough (%) 50 % 06/02/21 1327   Wound Bed Granulation (%) - Post-Procedure 100 % 05/19/21 1000   Wound Bed Slough % - (Post-Procedure) 0 % 06/02/21 1327   Tunneling (cm) 0 cm 06/09/21 1300   Undermining (cm) 0 cm 06/09/21 1300   Wound Odor None 06/09/21 1300    Exposed Structures None 06/09/21 1300       Wound 05/28/21 LLE Anterior Proximal (Active)   Wound Image   06/09/21 1300   Site Assessment Pink;Yellow 06/09/21 1300   Periwound Assessment Edema;Fragile 06/09/21 1300   Margins Attached edges 06/09/21 1300   Drainage Amount Moderate 06/09/21 1300   Drainage Description Serosanguineous 06/09/21 1300   Treatments Cleansed;Topical Lidocaine;Provider debridement 06/09/21 1300   Wound Cleansing Puracyn [place] 06/09/21 1300   Periwound Protectant TAC Ointment 06/09/21 1300   Dressing Cleansing/Solutions Not Applicable 06/09/21 1300   Dressing Options Soft Conforming Roll;Tubigrip 06/09/21 1300   Dressing Changed Changed 06/09/21 1300   Dressing Change/Treatment Frequency Daily, and As Needed 06/09/21 1300   Non-staged Wound Description Full thickness 06/09/21 1300   Wound Length (cm) 3.1 cm 06/02/21 1327   Wound Width (cm) 4.5 cm 06/02/21 1327   Wound Depth (cm) 0.1 cm 06/02/21 1327   Wound Surface Area (cm^2) 13.95 cm^2 06/02/21 1327   Wound Volume (cm^3) 1.4 cm^3 06/02/21 1327   Post-Procedure Length (cm) 3.7 cm 06/02/21 1327   Post-Procedure Width (cm) 5 cm 06/02/21 1327   Post-Procedure Depth (cm) 0.1 cm 06/02/21 1327   Post-Procedure Surface Area (cm^2) 18.5 cm^2 06/02/21 1327   Post-Procedure Volume (cm^3) 1.85 cm^3 06/02/21 1327   Wound Healing % -312 06/02/21 1327   Wound Bed Slough (%) 10 % 06/02/21 1327   Wound Bed Slough % - (Post-Procedure) 0 % 06/02/21 1327   Tunneling (cm) 0 cm 06/09/21 1300   Undermining (cm) 0 cm 06/09/21 1300   Wound Odor None 06/09/21 1300   Exposed Structures None 06/09/21 1300       Wound 05/28/21 LLE Medial Proximal (Active)   Wound Image    06/09/21 1300   Site Assessment Red;Yellow 06/09/21 1300   Periwound Assessment Edema;Fragile 06/09/21 1300   Margins Attached edges 06/09/21 1300   Drainage Amount Moderate 06/09/21 1300   Drainage Description Serosanguineous 06/09/21 1300   Treatments Cleansed;Topical Lidocaine;Provider  debridement 06/09/21 1300   Wound Cleansing Puracyn [place] 06/09/21 1300   Periwound Protectant TAC Ointment 06/09/21 1300   Dressing Cleansing/Solutions Not Applicable 06/09/21 1300   Dressing Options Hydrofiber Silver;Absorbent Abdominal Pad;Soft Conforming Roll;Tubigrip 06/09/21 1300   Dressing Changed Changed 06/09/21 1300   Dressing Status Clean;Dry;Intact 06/09/21 1300   Dressing Change/Treatment Frequency Daily, and As Needed 06/09/21 1300   Non-staged Wound Description Full thickness 06/09/21 1300   Wound Length (cm) 3.2 cm 06/09/21 1300   Wound Width (cm) 2.5 cm 06/09/21 1300   Wound Depth (cm) 0.4 cm 06/09/21 1300   Wound Surface Area (cm^2) 8 cm^2 06/09/21 1300   Wound Volume (cm^3) 3.2 cm^3 06/09/21 1300   Post-Procedure Length (cm) 3.3 cm 06/09/21 1300   Post-Procedure Width (cm) 2.7 cm 06/09/21 1300   Post-Procedure Depth (cm) 0.4 cm 06/09/21 1300   Post-Procedure Surface Area (cm^2) 8.91 cm^2 06/09/21 1300   Post-Procedure Volume (cm^3) 3.56 cm^3 06/09/21 1300   Wound Healing % -26259 06/09/21 1300   Wound Bed Slough (%) 5 % 06/02/21 1327   Wound Bed Slough % - (Post-Procedure) 0 % 06/02/21 1327   Tunneling (cm) 0 cm 06/09/21 1300   Undermining (cm) 0 cm 06/09/21 1300   Wound Odor None 06/09/21 1300   Exposed Structures None 06/09/21 1300          PROCEDURE: Excisional debridement of left lower extremity wounds   -2% viscous lidocaine applied topically to wound beds for approximately 10 minutes prior to debridement  -4 mm curette used to excise thick layer of slough from all wound beds.  Excisional debridement was performed to remove devitalized tissue from all wounds until healthy, bleeding tissue was visualized.  Total area debrided approximately (100% accuracy of wound measurement difficult due to the diffuse nature of the wounds) 200 cm²  debrided into the subcutaneous layer of all wounds.    -Bleeding controlled with manual pressure.    -Wounds cleansed with normal saline  -Patient tolerated the  procedure well, without c/o pain or discomfort.         Pertinent Labs and Diagnostics:    Labs:     A1c:   Lab Results   Component Value Date/Time    HBA1C 5.7 (H) 2020 11:22 AM      Imagin/3/2020-MRI left tibia-fibula with and without contrast  IMPRESSION:     Moderate lateral leg cellulitis and underlying myositis     No abscess or osteomyelitis    VASCULAR STUDIES: BHUPINDER 2019   Right.    Doppler waveform of the common femoral artery is of high amplitude and    triphasic.    Doppler waveforms at the ankle are brisk and triphasic.    Ankle-brachial index is normal.       Left.    Could not perform ankle-brachial index due to large blisters/ulcers.   Normal toe-brachial index: 0.94   Doppler waveform of the common femoral artery is of high amplitude and    triphasic.    Biphasic waveforms seen at the ankle.     WOUND CULTURE:    2020: Culture collected in clinic  Culture Result Abnormal   Staphylococcus aureus   Light growth     Culture Result Abnormal   Pseudomonas aeruginosa   Light growth     Culture Result Abnormal   Enterococcus faecalis   Light growth              ASSESSMENT AND PLAN:     1. Skin ulcer of left lower leg with fat layer exposed (HCC)  Comment: Patient has history of recurring ulcerations to left lower extremity.  Has undergone ablation procedures by Dr. White.  Discharged from [facility] in 2020 due to healing, returned approximately 3 weeks later much worse.  Hospitalized from  until 2020 underwent multiple surgical debridements.    21: Pseudomonal growth has significantly decreased to left lower extremity.      -Excisional debridement of wounds in clinic today, medically necessary to promote wound healing.  Rolled edges taken down to largest wound to left lateral lower extremity again in clinic today.  -Patient underwent bilateral lower extremity arterial ultrasound left superficial femoral, profunda femoral, popliteal and tibial artery stenosis 50 to  75%.  No stenosis to the right leg.  There is no plan for arterial intervention at this time per Dr. Rhodes.    -Patient to return to clinic weekly for assessment and debridement  -Home health to change dressing 4 times per week in between clinic visits.  Patient's daughter is to change dressing Saturday and Sunday.  We will change the patient's dressing while in clinic 1 time a week.  This will cover the patient for a dressing change every day.   Patient is to change dressing as needed for saturation.    Wound care: Hydrofiber silver, absorbent abdominal pad, soft conforming roll, Tubigrip E    2. Chronic venous stasis  Comments: Hemosiderin staining, brawny edema, scarring- findings consistent with CVI.  Pt established at [facility], underwent ablation procedures in 2019.     -Patient was seen by Dr. Rhodes at [facility] no further plans for arterial intervention at this time.     3. Acquired deformity of left ankle and foot  Comments: Charcot deformity of foot and ankle.  Very little ROM of ankle.  Patient was referred to orthopedics previously, however did not make appointment.  -Patient will need to follow-up with orthopedics once wounds are healed.    4.  Wound infection     06/9/21:   Patient historically chronically colonized with Pseudomonas to bilateral lower extremities.  Patient's wound has significantly less pseudomonal growth.  Patient will finish Cipro on 6/12/2021.  -Patient to shower daily with soap and water dry wound beds thoroughly and reapply dressings daily.  -Continue  home health to 4 times a week for additional dressing changes 1 time a week in clinic and his daughter to change on the weekend.    5.  Pain associated with wound    6/9/2021:  -2% viscous lidocaine applied topically to wound bed for approximately 5 minutes prior to debridement  -Patient tolerated procedure today with no complaints of discomfort.    6.  Noncompliance  Comments: Patient has a history of noncompliance not following  prescribed wound care dressings or showering daily.  Patient has not been compliant with antibiotic administration in the past.  However, patient's bilateral lower extremities have improved with significantly less Pseudomonas patient does report that he is following the current plan of care.      Please note that this note may have been created using voice recognition software. I have worked with technical experts from [facility] to optimize the interface.  I have made every reasonable attempt to correct obvious errors, but there may be errors of grammar and possibly content that I did not discover before finalizing the note.    N

## 2021-06-16 ENCOUNTER — OFFICE VISIT (OUTPATIENT)
Dept: WOUND CARE | Facility: MEDICAL CENTER | Age: 66
End: 2021-06-16
Attending: NURSE PRACTITIONER
Payer: MEDICARE

## 2021-06-16 VITALS
SYSTOLIC BLOOD PRESSURE: 149 MMHG | TEMPERATURE: 97.6 F | OXYGEN SATURATION: 99 % | RESPIRATION RATE: 16 BRPM | DIASTOLIC BLOOD PRESSURE: 97 MMHG | HEART RATE: 63 BPM

## 2021-06-16 DIAGNOSIS — T14.8XXA WOUND INFECTION: ICD-10-CM

## 2021-06-16 DIAGNOSIS — I87.8 CHRONIC VENOUS STASIS: ICD-10-CM

## 2021-06-16 DIAGNOSIS — L97.922 SKIN ULCER OF LEFT LOWER LEG WITH FAT LAYER EXPOSED (HCC): Primary | ICD-10-CM

## 2021-06-16 DIAGNOSIS — L08.9 WOUND INFECTION: ICD-10-CM

## 2021-06-16 DIAGNOSIS — M21.962 ACQUIRED DEFORMITY OF LEFT ANKLE AND FOOT: ICD-10-CM

## 2021-06-16 PROCEDURE — 11045 DBRDMT SUBQ TISS EACH ADDL: CPT | Performed by: NURSE PRACTITIONER

## 2021-06-16 PROCEDURE — 11042 DBRDMT SUBQ TIS 1ST 20SQCM/<: CPT | Performed by: NURSE PRACTITIONER

## 2021-06-16 PROCEDURE — 11045 DBRDMT SUBQ TISS EACH ADDL: CPT

## 2021-06-16 PROCEDURE — 11042 DBRDMT SUBQ TIS 1ST 20SQCM/<: CPT

## 2021-06-16 ASSESSMENT — ENCOUNTER SYMPTOMS
NAUSEA: 0
COUGH: 0
SHORTNESS OF BREATH: 0
MYALGIAS: 0
DIZZINESS: 0
FEVER: 0
CLAUDICATION: 0
CHILLS: 0
DEPRESSION: 1
PALPITATIONS: 0
VOMITING: 0
WHEEZING: 0
HEADACHES: 0
WEAKNESS: 0

## 2021-06-16 NOTE — PATIENT INSTRUCTIONS
-Keep dressings clean and dry. Change dressings if they become over saturated, soiled or fall off.     -Avoid prolonged standing or sitting without elevating your legs.    -Remove your compression garments if you have severe pain, severe swelling, numbness, color change, or temperature change in your toes. If you need to remove your compression garments, do so by unrolling them. Do not cut the compression garments off, this is to prevent cutting yourself on accident.    -Should you experience any significant changes in your wound(s), such as signs of infection (redness, swelling, localized heat, increased pain, fever > 101 F, chills) or have any questions regarding your home care instructions, please contact the wound center at [phone]. If after hours, contact your primary care physician or go to the hospital emergency room.

## 2021-06-16 NOTE — PROGRESS NOTES
Home health orders faxed to [facility] #[phone].    Patient to see LPS rounds on 06/25/2021 per Sanju APRN for consult regarding possible surgical debridement due to fibrinous nature of wounds. Patient may benefit from wound vac/vera flow therapy. Per Sanju APRN request - please review vascular studies. This may be impeding healing. Patient seen by Dr. Rhodes while admitted at [facility].

## 2021-06-16 NOTE — PROGRESS NOTES
Provider Encounter- Full Thickness wound    HISTORY OF PRESENT ILLNESS  Wound History:    START OF CARE IN CLINIC: 5/20/2020               REFERRING PROVIDER: KEV Keith                 WOUND ETIOLOGY: venous,  likely mixed etiology              LOCATION: Left lower leg, multiple wounds                                   Right lower leg, multiple wounds-first observed in clinic on initial evaluation, 6/5/2020.  Resolved 9/2/2020                HISTORY: Patient is very well-known to this clinic from previous treatment of wounds to his left lower extremity.  He was discharged from the clinic in March 2020 due to full resolution of his wound.  He returned to the clinic approximately 3 weeks later, on 4/16, with cellulitis, edema, and large open wounds to his left lower leg, and was sent directly to [facility] emergency room.  He was admitted for IV antibiotics and surgical intervention.  On 4/22/2020, he underwent an I&D of his left lower extremity, fasciotomy, and VAC placement.  In the following weeks he underwent surgery 3 more times for irrigation and debridement.  He was discharged home on 5/21, on the VAC, with home health and a referral to [facility].      4/21/2021: Patient was recently discharged from [facility] following being sent from the wound care clinic.  There the patient received IV antibiotics and underwent arterial imaging.  Patient returns to [facility] for care of bilateral lower extremity wounds.      Pertinent Medical History:  Anxiety, Charcot's joint of left foot, non-diabetic (3/21/2016), Chronic congestive heart failure (HCC) (11/16/2017), Hepatitis C, chronic ([facility]), Hypertension, Migraine, Polysubstance abuse ([facility]) (3/8/2018), Tobacco use (4/18/2016), Ulcer of left lower extremity with necrosis of muscle ([facility]) (3/21/2016), and Venous stasis ulcer ([facility]) (2017).                ETIOLOGY HISTORY:  Vascular Surgeon: Dr. White. Compression Circ-aid. Varicose Veins none  visible     TOBACCO USE: Patient denies; patient also denies alcohol and recreational drug use    Patient's problem list, allergies, and current medications reviewed and updated in Epic    Interval History:  Interval History thinned 2/10/2021.  Please see previous notes for complete interval history.       4/21/2021: Clinic visit with KEV Luna. Patient states that they are feeling well today.  Patient denies fever, chills, nausea, vomiting, lightheadedness, dizziness, shortness of breath and chest pain.     4/28/2021: Clinic visit with KEV Luna. Patient states that they are feeling well today.  Patient denies fever, chills, nausea, vomiting, lightheadedness, dizziness, shortness of breath and chest pain.  Patient reports that home health came on Friday and Saturday of last week and not the 4 times as ordered.  Patient also reports that he took a shower every day as instructed.  Later in the conversation the patient stated that he only took a shower on the days that home health came which is not every day.  We also called home health who went to the patient's home all 4 times as ordered however the patient did not answer the door.  Home health left bandages for patient to dress his wound.  Patient reports no bandages were left.  Patient is noncompliant with plan of care and noncompliant with home health assistance.  Patient's wounds are filled with Pseudomonas new wounds to the right lower extremity.    5/5/2021 : Clinic visit with KEV Brantley, RYAN, DONNIEN, CFBARBER.  Kodi states he is feeling well overall, denies fevers, chills, nausea, vomiting, cough or shortness of breath.  He has been taking his Cipro as prescribed, states he has 2 more pills left, will be completing these tomorrow morning.  Home health continues to see him daily during the week for dressing changes.  His daughter is changing his dressing on the weekends.  Is also been showering daily washing his  legs.    5/12/2021: Clinic visit with KEV Luna. Patient states that they are feeling well today.  Patient denies fever, chills, nausea, vomiting, lightheadedness, dizziness, shortness of breath and chest pain.  Patient reports that he has been taking showers daily as instructed.  Patient's pseudomonal load has significantly decreased left lower extremity is improving wounds are more shallow.  Wound to right lower extremity improving.    5/19/2021: Clinic visit with KEV Luna. Patient states that they are feeling well today.  Patient denies fever, chills, nausea, vomiting, lightheadedness, dizziness, shortness of breath and chest pain. Patients wound beds have improved with minimal pseudomonal levels.  Patient reports that he is showering every day.  The quality of the tissue has also improved a great deal with the largest wound bed decreasing in dimensions.  Continue current plan of care.    5/28/2021 : Clinic visit with Dr. Ayon.  Returns for a follow-up visit has questions if there is any soap that we recommend advised him not to use antibacterial soap but good gentle soap and lots of water.  He denies chills fever headache cough chest pain abdominal pain or other acute medical issues.    6/2/2021: Clinic visit with KEV Luna. Patient states that they are feeling well today.  Patient denies fever, chills, nausea, vomiting, lightheadedness, dizziness, shortness of breath and chest pain.  Patient's pseudomonal infection is worsening.  Patient is known to be chronically colonized.  Patient's wounds are deteriorating.  Patient has new wounds developing.  Therefore I have ordered the patient a 10-day course of ciprofloxacin.  Patient is to continue to take a shower daily with soap and water.  Rolled edges taken down in clinic today to the large left lateral wound.    6/9/2021: Clinic visit with KEV Luna. Patient states that they are feeling well today.  Patient  denies fever, chills, nausea, vomiting, lightheadedness, dizziness, shortness of breath and chest pain.  Patient's wounds are becoming more fibrotic in nature.  Patient has lost granulation tissue to the superior left lower lateral wound.  Patient is finishing up his course of ciprofloxacin patient should finish course on on 6/12/2020.    6/16/2021: Clinic visit with KEV Luna. Patient states that they are feeling well today.  Patient denies fever, chills, nausea, vomiting, lightheadedness, dizziness, shortness of breath and chest pain.  Patient has done a good job at hygiene decreasing pseudomonal load.  Patient still has evidence of Pseudomonas growth to left lower extremity significantly improved however.  Patient's tissue is fibrotic in nature.  Patient has a follow-up appointment next Friday in LPS rounds.  Patient will need at minimum in my opinion surgical debridement to get below fibrous tissue and restart the granulation process.  Patient has completed his course of ciprofloxacin.  Please review arterial ultrasound with Dr. Rhodes query 50 to 75% stenosis could this be contributing to nonhealing wounds according to the arteries affected?    REVIEW OF SYSTEMS:   Review of Systems   Constitutional: Negative for chills and fever.        States he is eating well, appetite normal   Respiratory: Negative for cough, shortness of breath and wheezing.    Cardiovascular: Positive for leg swelling. Negative for chest pain, palpitations and claudication.        Chronic swelling in legs, for several years   Gastrointestinal: Negative for nausea and vomiting.   Musculoskeletal: Positive for joint pain. Negative for myalgias.        Chronic   Skin:        Multiple diffuse open wounds to left lower extremity a few new open wounds to right lower extremity.  Pseudomonal drainage to bilateral lower extremities.    Neurological: Negative for dizziness, weakness and headaches.   Psychiatric/Behavioral: Positive  for depression.       PHYSICAL EXAMINATION:   /97 Comment: Vital signs taken by April CNA  Pulse 63 Comment: Vital signs taken by April CNA  Temp 36.4 °C (97.6 °F) (Temporal) Comment: Vital signs taken by April CNA  Resp 16 Comment: Vital signs taken by April CNA  SpO2 99% Comment: Vital signs taken by April CNA    Physical Exam  Constitutional:       General: He is not in acute distress.     Appearance: He is not diaphoretic.   HENT:      Head: Normocephalic and atraumatic.   Eyes:      Conjunctiva/sclera: Conjunctivae normal.      Pupils: Pupils are equal, round, and reactive to light.   Pulmonary:      Effort: Pulmonary effort is normal. No respiratory distress.      Breath sounds: No wheezing.   Musculoskeletal:         General: Deformity present. Normal range of motion.      Cervical back: Neck supple.      Comments: Left charcot foot and ankle     Skin:     General: Skin is warm.      Comments: Large full-thickness wounds to lower extremities  Patient colonized with Pseudomonas to bilateral lower extremities.    Neurological:      Mental Status: He is alert and oriented to person, place, and time.   Psychiatric:         Mood and Affect: Mood and affect normal.         Judgment: Judgment normal.                                                           WOUND ASSESSMENT           Wound 06/05/20 Full Thickness Wound Leg Left --Left Lateral LE (Active)   Wound Image   06/16/21 1339   Site Assessment Pink;Yellow 06/16/21 1339   Periwound Assessment Hemosiderin Staining;Scar tissue;Edema 06/16/21 1339   Margins Epibole (rolled edges) 06/16/21 1339   Closure Secondary intention 01/20/21 1300   Drainage Amount Large 06/16/21 1339   Drainage Description Serosanguineous 06/16/21 1339   Treatments Cleansed;Topical Lidocaine;Provider debridement 06/16/21 1339   Wound Cleansing Puracyn [place] 06/16/21 1339   Periwound Protectant Barrier Paste 06/16/21 1339   Dressing Cleansing/Solutions Not Applicable 06/16/21  1339   Dressing Options Hydrofiber Silver;Absorbent Abdominal Pad;Dry Roll Gauze;Hypafix Tape;Tubigrip 06/16/21 1339   Dressing Changed Changed 05/19/21 1000   Dressing Status Clean;Dry;Intact 04/28/21 1400   Dressing Change/Treatment Frequency Daily, and As Needed 05/19/21 1000   Non-staged Wound Description Full thickness 06/16/21 1339   Wound Length (cm) 15.5 cm 06/09/21 1300   Wound Width (cm) 5.5 cm 06/09/21 1300   Wound Depth (cm) 0.2 cm 06/09/21 1300   Wound Surface Area (cm^2) 85.25 cm^2 06/09/21 1300   Wound Volume (cm^3) 17.05 cm^3 06/09/21 1300   Post-Procedure Length (cm) 15.3 cm 06/16/21 1339   Post-Procedure Width (cm) 4.7 cm 06/16/21 1339   Post-Procedure Depth (cm) 0.3 cm 06/16/21 1339   Post-Procedure Surface Area (cm^2) 71.91 cm^2 06/16/21 1339   Post-Procedure Volume (cm^3) 21.57 cm^3 06/16/21 1339   Wound Healing % 69 06/09/21 1300   Wound Bed Granulation (%) 40 % 05/28/21 1100   Wound Bed Epithelium (%) 90 % 02/10/21 1300   Wound Bed Slough (%) 100 % 06/02/21 1327   Wound Bed Granulation (%) - Post-Procedure 100 % 05/19/21 1000   Wound Bed Slough % - (Post-Procedure) 90 % 06/16/21 1339   Tunneling (cm) 0 cm 06/16/21 1339   Undermining (cm) 0 cm 06/16/21 1339   Wound Odor None 06/16/21 1339   Exposed Structures None 06/16/21 1339       Wound 01/20/21 --Left Medial/Posterior/Lateral LE (Active)   Wound Image   06/16/21 1339   Site Assessment Red;Yellow 06/16/21 1339   Periwound Assessment Hemosiderin Staining;Maceration;Edema 06/16/21 1339   Margins Epibole (rolled edges) 06/16/21 1339   Closure Secondary intention 02/24/21 1400   Drainage Amount Large 06/16/21 1339   Drainage Description Serosanguineous 06/16/21 1339   Treatments Cleansed;Topical Lidocaine;Provider debridement 06/16/21 1339   Wound Cleansing Puracyn [place] 06/16/21 1339   Periwound Protectant Barrier Paste 06/16/21 1339   Dressing Cleansing/Solutions Not Applicable 06/16/21 1339   Dressing Options Hydrofiber Silver;Absorbent  Abdominal Pad;Dry Roll Gauze;Hypafix Tape;Tubigrip 06/16/21 1339   Dressing Changed Changed 05/19/21 1000   Dressing Status Clean;Dry;Intact 02/24/21 1400   Dressing Change/Treatment Frequency Daily, and As Needed 05/19/21 1000   Non-staged Wound Description Full thickness 06/16/21 1339   Wound Length (cm) 16 cm 06/09/21 1300   Wound Width (cm) 15.5 cm 06/09/21 1300   Wound Depth (cm) 0.5 cm 06/09/21 1300   Wound Surface Area (cm^2) 248 cm^2 06/09/21 1300   Wound Volume (cm^3) 124 cm^3 06/09/21 1300   Post-Procedure Length (cm) 10.4 cm 06/16/21 1339   Post-Procedure Width (cm) 11.5 cm 06/16/21 1339   Post-Procedure Depth (cm) 0.5 cm 06/16/21 1339   Post-Procedure Surface Area (cm^2) 119.6 cm^2 06/16/21 1339   Post-Procedure Volume (cm^3) 59.8 cm^3 06/16/21 1339   Wound Healing % -[id number] 06/09/21 1300   Wound Bed Granulation (%) 70 % 05/28/21 1100   Wound Bed Epithelium (%) 0 % 02/10/21 1300   Wound Bed Slough (%) 75 % 06/16/21 1339   Wound Bed Eschar (%) 0 % 02/10/21 1300   Wound Bed Granulation (%) - Post-Procedure 25 % 06/02/21 1327   Tunneling (cm) 0 cm 06/16/21 1339   Undermining (cm) 0 cm 06/16/21 1339   Wound Odor None 06/16/21 1339   Exposed Structures None 06/16/21 1339       Wound 04/11/21 Leg Lower Right (Active)       Wound 04/11/21 Leg Lower Left (Active)       Wound 04/11/21 Heel Right DTI (Active)       Wound 04/28/21 --Right Medial LE (Active)   Wound Image   06/16/21 1339   Site Assessment Dry;Yellow 06/16/21 1339   Periwound Assessment Fragile;Hemosiderin Staining;Edema 06/16/21 1339   Margins Attached edges 06/16/21 1339   Drainage Amount Small 06/16/21 1339   Drainage Description Serosanguineous 06/16/21 1339   Treatments Cleansed;Topical Lidocaine;Provider debridement 06/16/21 1339   Wound Cleansing Puracyn [place] 06/16/21 1339   Periwound Protectant Barrier Paste 06/16/21 1339   Dressing Cleansing/Solutions Not Applicable 06/16/21 1339   Dressing Options Hydrofiber Silver;Absorbent Abdominal  Pad;Soft Conforming Roll;Tubigrip 06/09/21 1300   Dressing Changed Changed 06/09/21 1300   Dressing Status Clean;Dry;Intact 04/28/21 1400   Dressing Change/Treatment Frequency Daily, and As Needed 06/09/21 1300   Non-staged Wound Description Full thickness 06/16/21 1339   Wound Length (cm) 1.1 cm 06/09/21 1300   Wound Width (cm) 0.6 cm 06/09/21 1300   Wound Depth (cm) 0.1 cm 06/09/21 1300   Wound Surface Area (cm^2) 0.66 cm^2 06/09/21 1300   Wound Volume (cm^3) 0.07 cm^3 06/09/21 1300   Post-Procedure Length (cm) 1.1 cm 06/09/21 1300   Post-Procedure Width (cm) 0.7 cm 06/09/21 1300   Post-Procedure Depth (cm) 0.1 cm 06/09/21 1300   Post-Procedure Surface Area (cm^2) 0.77 cm^2 06/09/21 1300   Post-Procedure Volume (cm^3) 0.08 cm^3 06/09/21 1300   Wound Healing % 88 06/09/21 1300   Wound Bed Granulation (%) 80 % 05/19/21 1000   Wound Bed Slough (%) 75 % 06/16/21 1339   Wound Bed Granulation (%) - Post-Procedure 100 % 05/19/21 1000   Wound Bed Slough % - (Post-Procedure) 25 % 06/02/21 1327   Tunneling (cm) 0 cm 06/16/21 1339   Undermining (cm) 0 cm 06/16/21 1339   Wound Odor None 06/16/21 1339   Exposed Structures None 06/16/21 1339       Wound 05/19/21 Venous Ulcer R medial ankle (Active)   Wound Image   06/16/21 1339   Site Assessment Red;Yellow 06/16/21 1339   Periwound Assessment Edema;Fragile 06/16/21 1339   Margins Attached edges 06/16/21 1339   Closure Secondary intention 05/19/21 1000   Drainage Amount Moderate 06/16/21 1339   Drainage Description Serosanguineous 06/16/21 1339   Treatments Cleansed;Topical Lidocaine;Provider debridement 06/16/21 1339   Wound Cleansing Puracyn [place] 06/16/21 1339   Periwound Protectant Barrier Paste 06/16/21 1339   Dressing Cleansing/Solutions Not Applicable 06/16/21 1339   Dressing Options Hydrofiber Silver;Nonadhesive Foam;Dry Roll Gauze;Hypafix Tape;Tubigrip 06/16/21 1339   Dressing Changed Changed 06/09/21 1300   Dressing Status Clean;Dry;Intact 06/09/21 1300   Dressing  Change/Treatment Frequency Daily, and As Needed 06/09/21 1300   Non-staged Wound Description Full thickness 06/16/21 1339   Wound Length (cm) 2.1 cm 06/09/21 1300   Wound Width (cm) 0.8 cm 06/09/21 1300   Wound Depth (cm) 0.1 cm 06/09/21 1300   Wound Surface Area (cm^2) 1.68 cm^2 06/09/21 1300   Wound Volume (cm^3) 0.17 cm^3 06/09/21 1300   Post-Procedure Length (cm) 1.6 cm 06/16/21 1339   Post-Procedure Width (cm) 0.9 cm 06/16/21 1339   Post-Procedure Depth (cm) 0.2 cm 06/16/21 1339   Post-Procedure Surface Area (cm^2) 1.44 cm^2 06/16/21 1339   Post-Procedure Volume (cm^3) 0.29 cm^3 06/16/21 1339   Wound Healing % -112 06/09/21 1300   Wound Bed Granulation (%) 80 % 05/19/21 1000   Wound Bed Slough (%) 75 % 06/16/21 1339   Wound Bed Granulation (%) - Post-Procedure 100 % 05/19/21 1000   Wound Bed Slough % - (Post-Procedure) 0 % 06/02/21 1327   Tunneling (cm) 0 cm 06/16/21 1339   Undermining (cm) 0 cm 06/16/21 1339   Wound Odor None 06/16/21 1339   Exposed Structures None 06/16/21 1339       Wound 05/28/21 LLE Medial Distal (Active)   Wound Image   06/16/21 1339   Site Assessment Red;Yellow 06/16/21 1339   Periwound Assessment Edema;Fragile 06/16/21 1339   Margins Epibole (rolled edges) 06/16/21 1339   Drainage Amount Large 06/16/21 1339   Drainage Description Serosanguineous 06/16/21 1339   Treatments Cleansed;Topical Lidocaine;Provider debridement 06/16/21 1339   Wound Cleansing Puracyn [place] 06/16/21 1339   Periwound Protectant Barrier Paste 06/16/21 1339   Dressing Cleansing/Solutions Not Applicable 06/16/21 1339   Dressing Options Hydrofiber Silver;Absorbent Abdominal Pad;Dry Roll Gauze;Hypafix Tape;Tubigrip 06/16/21 1339   Dressing Changed Changed 06/09/21 1300   Dressing Status Clean;Dry;Intact 06/09/21 1300   Dressing Change/Treatment Frequency Daily, and As Needed 06/09/21 1300   Non-staged Wound Description Full thickness 06/16/21 1339   Wound Length (cm) 3.2 cm 06/09/21 1300   Wound Width (cm) 2.5 cm  21 1300   Wound Depth (cm) 0.4 cm 21 1300   Wound Surface Area (cm^2) 8 cm^2 21 1300   Wound Volume (cm^3) 3.2 cm^3 21 1300   Post-Procedure Length (cm) 5 cm 21   Post-Procedure Width (cm) 3 cm 21   Post-Procedure Depth (cm) 0.5 cm 21   Post-Procedure Surface Area (cm^2) 15 cm^2 21   Post-Procedure Volume (cm^3) 7.5 cm^3 21   Wound Healing % -[id number] 21 1300   Wound Bed Slough (%) 25 % 21   Wound Bed Slough % - (Post-Procedure) 0 % 21 1327   Tunneling (cm) 0 cm 21   Undermining (cm) 0 cm 21   Wound Odor None 21   Exposed Structures None 21                 PROCEDURE: Excisional debridement of left lower extremity wounds   -2% viscous lidocaine applied topically to wound beds for approximately 10 minutes prior to debridement  -4 mm curette used to excise thick layer of slough from all wound beds.  Excisional debridement was performed to remove devitalized tissue from all wounds until healthy, bleeding tissue was visualized.  Total area debrided approximately (100% accuracy of wound measurement difficult due to the diffuse nature of the wounds) 200 cm²  debrided into the subcutaneous layer of all wounds.    -Bleeding controlled with manual pressure.    -Wounds cleansed with normal saline  -Patient tolerated the procedure well, without c/o pain or discomfort.         Pertinent Labs and Diagnostics:    Labs:     A1c:   Lab Results   Component Value Date/Time    HBA1C 5.7 (H) 2020 11:22 AM      Imagin/3/2020-MRI left tibia-fibula with and without contrast  IMPRESSION:     Moderate lateral leg cellulitis and underlying myositis     No abscess or osteomyelitis    VASCULAR STUDIES: BHUPINDER 2019   Right.    Doppler waveform of the common femoral artery is of high amplitude and    triphasic.    Doppler waveforms at the ankle are brisk and triphasic.    Ankle-brachial index  is normal.       Left.    Could not perform ankle-brachial index due to large blisters/ulcers.   Normal toe-brachial index: 0.94   Doppler waveform of the common femoral artery is of high amplitude and    triphasic.    Biphasic waveforms seen at the ankle.     WOUND CULTURE:    11/18/2020: Culture collected in clinic  Culture Result Abnormal   Staphylococcus aureus   Light growth     Culture Result Abnormal   Pseudomonas aeruginosa   Light growth     Culture Result Abnormal   Enterococcus faecalis   Light growth              ASSESSMENT AND PLAN:     1. Skin ulcer of left lower leg with fat layer exposed (HCC)  Comment: Patient has history of recurring ulcerations to left lower extremity.  Has undergone ablation procedures by Dr. White.  Discharged from [facility] in March 2020 due to healing, returned approximately 3 weeks later much worse.  Hospitalized from 4/16 until 5/21/2020 underwent multiple surgical debridements.    06/16/21:     -Excisional debridement of wounds in clinic today, medically necessary to promote wound healing.  Rolled edges taken down to largest wound to left lateral lower extremity again in clinic today.  -Patient underwent bilateral lower extremity arterial ultrasound left superficial femoral, profunda femoral, popliteal and tibial artery stenosis 50 to 75%.  No stenosis to the right leg.  There is no plan for arterial intervention at this time per Dr. Rhodes.    -Patient to return to clinic weekly for assessment and debridement  -Home health to change dressing 4 times per week in between clinic visits.  Patient's daughter is to change dressing Saturday and Sunday.  We will change the patient's dressing while in clinic 1 time a week.  This will cover the patient for a dressing change every day.   Patient is to change dressing as needed for saturation.    Wound care: Hydrofiber silver, absorbent abdominal pad, dry roll gauze, Hypafix tape, Tubigrip D    2. Chronic venous stasis  Comments:  Hemosiderin staining, brawny edema, scarring- findings consistent with CVI.  Pt established at [facility], underwent ablation procedures in 2019.     -Patient was seen by Dr. Rhodes at [facility] no further plans for arterial intervention at this time.  May need to review with Dr. Rhodes if there is any necessity to reevaluate left lower extremity perfusion.    3. Acquired deformity of left ankle and foot  Comments: Charcot deformity of foot and ankle.  Very little ROM of ankle.  Patient was referred to orthopedics previously, however did not make appointment.  -Patient will need to follow-up with orthopedics once wounds are healed.    4.  Wound infection     06/16/21:   Patient historically chronically colonized with Pseudomonas to bilateral lower extremities.  Patient's wound has significantly less pseudomonal growth.  He has completed his Cipro course  -Patient to shower daily with soap and water dry wound beds thoroughly and reapply dressings daily.  -Continue  home health to 4 times a week for additional dressing changes 1 time a week in clinic and his daughter to change on the weekend.    5.  Pain associated with wound    6/16/2021:  -2% viscous lidocaine applied topically to wound bed for approximately 5 minutes prior to debridement  -Patient tolerated procedure today with no complaints of discomfort.    6.  Noncompliance  Comments: Patient has been compliant with showering daily pseudomonal load has significantly decreased.    Please note that this note may have been created using voice recognition software. I have worked with technical experts from [employer] to optimize the interface.  I have made every reasonable attempt to correct obvious errors, but there may be errors of grammar and possibly content that I did not discover before finalizing the note.    N

## 2021-06-23 ENCOUNTER — OFFICE VISIT (OUTPATIENT)
Dept: WOUND CARE | Facility: MEDICAL CENTER | Age: 66
End: 2021-06-23
Attending: NURSE PRACTITIONER
Payer: MEDICARE

## 2021-06-23 VITALS
RESPIRATION RATE: 17 BRPM | OXYGEN SATURATION: 99 % | TEMPERATURE: 97.5 F | HEART RATE: 63 BPM | SYSTOLIC BLOOD PRESSURE: 135 MMHG | DIASTOLIC BLOOD PRESSURE: 84 MMHG

## 2021-06-23 DIAGNOSIS — L97.922 SKIN ULCER OF LEFT LOWER LEG WITH FAT LAYER EXPOSED (HCC): ICD-10-CM

## 2021-06-23 DIAGNOSIS — R52 PAIN ASSOCIATED WITH WOUND: ICD-10-CM

## 2021-06-23 DIAGNOSIS — T14.8XXA PAIN ASSOCIATED WITH WOUND: ICD-10-CM

## 2021-06-23 DIAGNOSIS — Z91.199 NONCOMPLIANCE: ICD-10-CM

## 2021-06-23 DIAGNOSIS — M21.962 ACQUIRED DEFORMITY OF LEFT ANKLE AND FOOT: ICD-10-CM

## 2021-06-23 DIAGNOSIS — T14.8XXA WOUND INFECTION: ICD-10-CM

## 2021-06-23 DIAGNOSIS — I87.8 CHRONIC VENOUS STASIS: ICD-10-CM

## 2021-06-23 DIAGNOSIS — L08.9 WOUND INFECTION: ICD-10-CM

## 2021-06-23 PROCEDURE — 99214 OFFICE O/P EST MOD 30 MIN: CPT | Mod: 25

## 2021-06-23 PROCEDURE — 11042 DBRDMT SUBQ TIS 1ST 20SQCM/<: CPT | Performed by: NURSE PRACTITIONER

## 2021-06-23 PROCEDURE — 11045 DBRDMT SUBQ TISS EACH ADDL: CPT

## 2021-06-23 PROCEDURE — 11045 DBRDMT SUBQ TISS EACH ADDL: CPT | Performed by: NURSE PRACTITIONER

## 2021-06-23 PROCEDURE — 99213 OFFICE O/P EST LOW 20 MIN: CPT | Mod: 25 | Performed by: NURSE PRACTITIONER

## 2021-06-23 PROCEDURE — 11042 DBRDMT SUBQ TIS 1ST 20SQCM/<: CPT

## 2021-06-23 ASSESSMENT — ENCOUNTER SYMPTOMS
COUGH: 0
WHEEZING: 0
HEADACHES: 0
CHILLS: 0
DEPRESSION: 1
PALPITATIONS: 0
FEVER: 0
VOMITING: 0
SHORTNESS OF BREATH: 0
WEAKNESS: 0
NAUSEA: 0
DIZZINESS: 0
MYALGIAS: 0
CLAUDICATION: 0

## 2021-06-23 ASSESSMENT — PAIN SCALES - GENERAL: PAINLEVEL: NO PAIN

## 2021-06-23 NOTE — PROGRESS NOTES
Provider Encounter- Full Thickness wound    HISTORY OF PRESENT ILLNESS  Wound History:    START OF CARE IN CLINIC: 5/20/2020               REFERRING PROVIDER: KEV Keith                 WOUND ETIOLOGY: venous,  likely mixed etiology              LOCATION: Left lower leg, multiple wounds                                   Right lower leg, multiple wounds-first observed in clinic on initial evaluation, 6/5/2020.  Resolved 9/2/2020                HISTORY: Patient is very well-known to this clinic from previous treatment of wounds to his left lower extremity.  He was discharged from the clinic in March 2020 due to full resolution of his wound.  He returned to the clinic approximately 3 weeks later, on 4/16, with cellulitis, edema, and large open wounds to his left lower leg, and was sent directly to [facility] emergency room.  He was admitted for IV antibiotics and surgical intervention.  On 4/22/2020, he underwent an I&D of his left lower extremity, fasciotomy, and VAC placement.  In the following weeks he underwent surgery 3 more times for irrigation and debridement.  He was discharged home on 5/21, on the VAC, with home health and a referral to [facility].      4/21/2021: Patient was recently discharged from [facility] following being sent from the wound care clinic.  There the patient received IV antibiotics and underwent arterial imaging.  Patient returns to [facility] for care of bilateral lower extremity wounds.      Pertinent Medical History:  Anxiety, Charcot's joint of left foot, non-diabetic (3/21/2016), Chronic congestive heart failure (HCC) (11/16/2017), Hepatitis C, chronic ([facility]), Hypertension, Migraine, Polysubstance abuse ([facility]) (3/8/2018), Tobacco use (4/18/2016), Ulcer of left lower extremity with necrosis of muscle ([facility]) (3/21/2016), and Venous stasis ulcer ([facility]) (2017).                ETIOLOGY HISTORY:  Vascular Surgeon: Dr. White. Compression Circ-aid. Varicose Veins none  visible     TOBACCO USE: Patient denies; patient also denies alcohol and recreational drug use    Patient's problem list, allergies, and current medications reviewed and updated in Epic    Interval History:  Interval History thinned 2/10/2021.  Please see previous notes for complete interval history.       4/21/2021: Clinic visit with KEV Luna. Patient states that they are feeling well today.  Patient denies fever, chills, nausea, vomiting, lightheadedness, dizziness, shortness of breath and chest pain.     4/28/2021: Clinic visit with KEV Luna. Patient states that they are feeling well today.  Patient denies fever, chills, nausea, vomiting, lightheadedness, dizziness, shortness of breath and chest pain.  Patient reports that home health came on Friday and Saturday of last week and not the 4 times as ordered.  Patient also reports that he took a shower every day as instructed.  Later in the conversation the patient stated that he only took a shower on the days that home health came which is not every day.  We also called home health who went to the patient's home all 4 times as ordered however the patient did not answer the door.  Home health left bandages for patient to dress his wound.  Patient reports no bandages were left.  Patient is noncompliant with plan of care and noncompliant with home health assistance.  Patient's wounds are filled with Pseudomonas new wounds to the right lower extremity.    5/5/2021 : Clinic visit with KEV Brantley, RYAN, DONNIEN, CFBARBER.  Kodi states he is feeling well overall, denies fevers, chills, nausea, vomiting, cough or shortness of breath.  He has been taking his Cipro as prescribed, states he has 2 more pills left, will be completing these tomorrow morning.  Home health continues to see him daily during the week for dressing changes.  His daughter is changing his dressing on the weekends.  Is also been showering daily washing his  legs.    5/12/2021: Clinic visit with KVE Luna. Patient states that they are feeling well today.  Patient denies fever, chills, nausea, vomiting, lightheadedness, dizziness, shortness of breath and chest pain.  Patient reports that he has been taking showers daily as instructed.  Patient's pseudomonal load has significantly decreased left lower extremity is improving wounds are more shallow.  Wound to right lower extremity improving.    5/19/2021: Clinic visit with KEV Luna. Patient states that they are feeling well today.  Patient denies fever, chills, nausea, vomiting, lightheadedness, dizziness, shortness of breath and chest pain. Patients wound beds have improved with minimal pseudomonal levels.  Patient reports that he is showering every day.  The quality of the tissue has also improved a great deal with the largest wound bed decreasing in dimensions.  Continue current plan of care.    5/28/2021 : Clinic visit with Dr. Ayon.  Returns for a follow-up visit has questions if there is any soap that we recommend advised him not to use antibacterial soap but good gentle soap and lots of water.  He denies chills fever headache cough chest pain abdominal pain or other acute medical issues.    6/2/2021: Clinic visit with KEV Luna. Patient states that they are feeling well today.  Patient denies fever, chills, nausea, vomiting, lightheadedness, dizziness, shortness of breath and chest pain.  Patient's pseudomonal infection is worsening.  Patient is known to be chronically colonized.  Patient's wounds are deteriorating.  Patient has new wounds developing.  Therefore I have ordered the patient a 10-day course of ciprofloxacin.  Patient is to continue to take a shower daily with soap and water.  Rolled edges taken down in clinic today to the large left lateral wound.    6/9/2021: Clinic visit with KEV Luna. Patient states that they are feeling well today.  Patient  denies fever, chills, nausea, vomiting, lightheadedness, dizziness, shortness of breath and chest pain.  Patient's wounds are becoming more fibrotic in nature.  Patient has lost granulation tissue to the superior left lower lateral wound.  Patient is finishing up his course of ciprofloxacin patient should finish course on on 6/12/2020.    6/16/2021: Clinic visit with KEV Luna. Patient states that they are feeling well today.  Patient denies fever, chills, nausea, vomiting, lightheadedness, dizziness, shortness of breath and chest pain.  Patient has done a good job at hygiene decreasing pseudomonal load.  Patient still has evidence of Pseudomonas growth to left lower extremity significantly improved however.  Patient's tissue is fibrotic in nature.  Patient has a follow-up appointment next Friday in LPS rounds.  Patient will need at minimum in my opinion surgical debridement to get below fibrous tissue and restart the granulation process.  Patient has completed his course of ciprofloxacin.  Please review arterial ultrasound with Dr. Rhodes query 50 to 75% stenosis could this be contributing to nonhealing wounds according to the arteries affected?    6/23/2021 : Clinic visit with KEV Brantley, FNP-BC, CWOCN, CFCN.  Goran states he is feeling well overall.  [facility] continues to see him daily for dressing changes in between clinic visits.  He states his daughter is also helping with dressing changes.  He is showering daily.  He is scheduled to be seen in LPS rounds on Friday.    REVIEW OF SYSTEMS:   Review of Systems   Constitutional: Negative for chills and fever.        States he is eating well, appetite normal   Respiratory: Negative for cough, shortness of breath and wheezing.    Cardiovascular: Positive for leg swelling. Negative for chest pain, palpitations and claudication.        Chronic swelling in legs, for several years   Gastrointestinal: Negative for nausea and vomiting.    Musculoskeletal: Positive for joint pain. Negative for myalgias.        Chronic   Skin:        Multiple diffuse open wounds to left lower extremity a few new open wounds to right lower extremity.  Pseudomonal drainage to bilateral lower extremities.    Neurological: Negative for dizziness, weakness and headaches.   Psychiatric/Behavioral: Positive for depression.       PHYSICAL EXAMINATION:   /84   Pulse 63   Temp 36.4 °C (97.5 °F)   Resp 17   SpO2 99%     Physical Exam  Constitutional:       General: He is not in acute distress.     Appearance: He is not diaphoretic.   HENT:      Head: Normocephalic and atraumatic.   Eyes:      Conjunctiva/sclera: Conjunctivae normal.      Pupils: Pupils are equal, round, and reactive to light.   Pulmonary:      Effort: Pulmonary effort is normal. No respiratory distress.      Breath sounds: No wheezing.   Musculoskeletal:         General: Deformity present. Normal range of motion.      Cervical back: Neck supple.      Comments: Left charcot foot and ankle     Skin:     General: Skin is warm.      Comments: Large full-thickness wounds to lower extremities  Patient colonized with Pseudomonas to bilateral lower extremities.    Neurological:      Mental Status: He is alert and oriented to person, place, and time.   Psychiatric:         Mood and Affect: Mood and affect normal.         Judgment: Judgment normal.                                                           WOUND ASSESSMENT       Wound 06/05/20 Full Thickness Wound Leg Left --Left Lateral LE (Active)   Wound Image    06/23/21 1330   Site Assessment Red;Yellow;Pink 06/23/21 1330   Periwound Assessment Hemosiderin Staining;Scar tissue;Edema 06/23/21 1330   Margins Epibole (rolled edges) 06/23/21 1330   Drainage Amount Large 06/23/21 1330   Drainage Description Yellow;Green 06/23/21 1330   Treatments Cleansed;Topical Lidocaine;Provider debridement 06/23/21 1330   Wound Cleansing Puracyn [place] 06/23/21 1330    Periwound Protectant Barrier Paste 06/23/21 1330   Dressing Cleansing/Solutions Not Applicable 06/23/21 1330   Dressing Options Hydrofiber Silver;Absorbent Abdominal Pad;Dry Roll Gauze;Hypafix Tape;Tubigrip 06/23/21 1330   Dressing Changed Changed 05/19/21 1000   Dressing Status Clean;Dry;Intact 04/28/21 1400   Dressing Change/Treatment Frequency Daily, and As Needed 05/19/21 1000   Non-staged Wound Description Full thickness 06/23/21 1330   Wound Length (cm) 15.5 cm 06/23/21 1330   Wound Width (cm) 5.5 cm 06/23/21 1330   Wound Depth (cm) 0.3 cm 06/23/21 1330   Wound Surface Area (cm^2) 85.25 cm^2 06/23/21 1330   Wound Volume (cm^3) 25.58 cm^3 06/23/21 1330   Post-Procedure Length (cm) 15.8 cm 06/23/21 1330   Post-Procedure Width (cm) 5.7 cm 06/23/21 1330   Post-Procedure Depth (cm) 0.3 cm 06/23/21 1330   Post-Procedure Surface Area (cm^2) 90.06 cm^2 06/23/21 1330   Post-Procedure Volume (cm^3) 27.02 cm^3 06/23/21 1330   Wound Healing % 53 06/23/21 1330   Wound Bed Granulation (%) 40 % 05/28/21 1100   Wound Bed Epithelium (%) 90 % 02/10/21 1300   Wound Bed Slough (%) 100 % 06/02/21 1327   Wound Bed Granulation (%) - Post-Procedure 100 % 05/19/21 1000   Wound Bed Slough % - (Post-Procedure) 90 % 06/16/21 1339   Tunneling (cm) 0 cm 06/23/21 1330   Undermining (cm) 0 cm 06/23/21 1330   Wound Odor Mild;Foul 06/23/21 1330   Exposed Structures None 06/23/21 1330       Wound 01/20/21 --Left Medial/Posterior/Lateral LE (Active)   Wound Image      06/23/21 1330   Site Assessment Red;Yellow;Brown 06/23/21 1330   Periwound Assessment Hemosiderin Staining;Maceration;Edema 06/23/21 1330   Margins Epibole (rolled edges) 06/23/21 1330   Closure Secondary intention 02/24/21 1400   Drainage Amount Large 06/23/21 1330   Drainage Description Green;Yellow 06/23/21 1330   Treatments Cleansed;Topical Lidocaine;Provider debridement 06/23/21 1330   Wound Cleansing Puracyn [place] 06/23/21 1330   Periwound Protectant Barrier Paste 06/23/21  1330   Dressing Cleansing/Solutions Not Applicable 06/23/21 1330   Dressing Options Hydrofiber Silver;Absorbent Abdominal Pad;Dry Roll Gauze;Hypafix Tape;Tubigrip 06/23/21 1330   Dressing Changed Changed 05/19/21 1000   Dressing Status Clean;Dry;Intact 02/24/21 1400   Dressing Change/Treatment Frequency Daily, and As Needed 05/19/21 1000   Non-staged Wound Description Full thickness 06/23/21 1330   Wound Length (cm) 15.5 cm 06/23/21 1330   Wound Width (cm) 15 cm 06/23/21 1330   Wound Depth (cm) 0.5 cm 06/23/21 1330   Wound Surface Area (cm^2) 232.5 cm^2 06/23/21 1330   Wound Volume (cm^3) 116.25 cm^3 06/23/21 1330   Post-Procedure Length (cm) 15.5 cm 06/23/21 1330   Post-Procedure Width (cm) 15 cm 06/23/21 1330   Post-Procedure Depth (cm) 0.6 cm 06/23/21 1330   Post-Procedure Surface Area (cm^2) 232.5 cm^2 06/23/21 1330   Post-Procedure Volume (cm^3) 139.5 cm^3 06/23/21 1330   Wound Healing % -90975 06/23/21 1330   Wound Bed Granulation (%) 70 % 05/28/21 1100   Wound Bed Epithelium (%) 0 % 02/10/21 1300   Wound Bed Slough (%) 75 % 06/16/21 1339   Wound Bed Eschar (%) 0 % 02/10/21 1300   Wound Bed Granulation (%) - Post-Procedure 25 % 06/02/21 1327   Tunneling (cm) 0 cm 06/23/21 1330   Undermining (cm) 0 cm 06/23/21 1330   Wound Odor Mild;Foul 06/23/21 1330   Exposed Structures None 06/23/21 1330       Wound 04/11/21 Leg Lower Right (Active)       Wound 04/11/21 Leg Lower Left (Active)       Wound 04/11/21 Heel Right DTI (Active)       Wound 04/28/21 --Right Medial LE (Active)   Wound Image    06/23/21 1330   Site Assessment Yellow;Red 06/23/21 1330   Periwound Assessment Hemosiderin Staining;Edema 06/23/21 1330   Margins Attached edges 06/23/21 1330   Drainage Amount Moderate 06/23/21 1330   Drainage Description Serosanguineous 06/23/21 1330   Treatments Cleansed;Topical Lidocaine;Provider debridement 06/23/21 1330   Wound Cleansing Puracyn [place] 06/23/21 1330   Periwound Protectant Barrier Paste 06/23/21 1330    Dressing Cleansing/Solutions Not Applicable 06/23/21 1330   Dressing Options Hydrofiber Silver;Absorbent Abdominal Pad;Dry Roll Gauze;Hypafix Tape;Tubigrip 06/23/21 1330   Dressing Changed Changed 06/09/21 1300   Dressing Status Clean;Dry;Intact 04/28/21 1400   Dressing Change/Treatment Frequency Daily, and As Needed 06/09/21 1300   Non-staged Wound Description Full thickness 06/23/21 1330   Wound Length (cm) 1.4 cm 06/23/21 1330   Wound Width (cm) 1.2 cm 06/23/21 1330   Wound Depth (cm) 0.2 cm 06/23/21 1330   Wound Surface Area (cm^2) 1.68 cm^2 06/23/21 1330   Wound Volume (cm^3) 0.34 cm^3 06/23/21 1330   Post-Procedure Length (cm) 1.4 cm 06/23/21 1330   Post-Procedure Width (cm) 1.3 cm 06/23/21 1330   Post-Procedure Depth (cm) 0.2 cm 06/23/21 1330   Post-Procedure Surface Area (cm^2) 1.82 cm^2 06/23/21 1330   Post-Procedure Volume (cm^3) 0.36 cm^3 06/23/21 1330   Wound Healing % 40 06/23/21 1330   Wound Bed Granulation (%) 80 % 05/19/21 1000   Wound Bed Slough (%) 75 % 06/16/21 1339   Wound Bed Granulation (%) - Post-Procedure 100 % 05/19/21 1000   Wound Bed Slough % - (Post-Procedure) 25 % 06/02/21 1327   Tunneling (cm) 0 cm 06/23/21 1330   Undermining (cm) 0 cm 06/23/21 1330   Wound Odor None 06/23/21 1330   Exposed Structures None 06/23/21 1330       Wound 05/19/21 Venous Ulcer R medial ankle (Active)   Wound Image    06/23/21 1330   Site Assessment Red;Yellow 06/23/21 1330   Periwound Assessment Edema;Fragile 06/23/21 1330   Margins Attached edges 06/23/21 1330   Closure Secondary intention 05/19/21 1000   Drainage Amount Moderate 06/23/21 1330   Drainage Description Serosanguineous 06/23/21 1330   Treatments Cleansed;Topical Lidocaine;Provider debridement 06/23/21 1330   Wound Cleansing Puracyn [place] 06/23/21 1330   Periwound Protectant Barrier Paste 06/23/21 1330   Dressing Cleansing/Solutions Not Applicable 06/23/21 1330   Dressing Options Hydrofiber Silver;Absorbent Abdominal Pad;Dry Roll Gauze;Hypafix  Tape;Tubigrip 06/23/21 1330   Dressing Changed Changed 06/23/21 1330   Dressing Status Clean;Dry;Intact 06/09/21 1300   Dressing Change/Treatment Frequency Daily, and As Needed 06/23/21 1330   Non-staged Wound Description Full thickness 06/23/21 1330   Wound Length (cm) 2.6 cm 06/23/21 1330   Wound Width (cm) 1.3 cm 06/23/21 1330   Wound Depth (cm) 0.3 cm 06/23/21 1330   Wound Surface Area (cm^2) 3.38 cm^2 06/23/21 1330   Wound Volume (cm^3) 1.01 cm^3 06/23/21 1330   Post-Procedure Length (cm) 2.6 cm 06/23/21 1330   Post-Procedure Width (cm) 1.4 cm 06/23/21 1330   Post-Procedure Depth (cm) 0.3 cm 06/23/21 1330   Post-Procedure Surface Area (cm^2) 3.64 cm^2 06/23/21 1330   Post-Procedure Volume (cm^3) 1.09 cm^3 06/23/21 1330   Wound Healing % -1162 06/23/21 1330   Wound Bed Granulation (%) 80 % 05/19/21 1000   Wound Bed Slough (%) 75 % 06/16/21 1339   Wound Bed Granulation (%) - Post-Procedure 100 % 05/19/21 1000   Wound Bed Slough % - (Post-Procedure) 0 % 06/02/21 1327   Tunneling (cm) 0 cm 06/23/21 1330   Undermining (cm) 0 cm 06/23/21 1330   Wound Odor None 06/23/21 1330   Exposed Structures None 06/23/21 1330       Wound 05/28/21 LLE Medial Distal (Active)   Wound Image    06/23/21 1330   Site Assessment Red;Yellow 06/23/21 1330   Periwound Assessment Edema;Fragile;Denuded 06/23/21 1330   Margins Epibole (rolled edges) 06/23/21 1330   Drainage Amount Large 06/23/21 1330   Drainage Description Green;Yellow 06/23/21 1330   Treatments Cleansed;Topical Lidocaine;Provider debridement 06/23/21 1330   Wound Cleansing Puracyn [place] 06/23/21 1330   Periwound Protectant Barrier Paste 06/23/21 1330   Dressing Cleansing/Solutions Not Applicable 06/23/21 1330   Dressing Options Hydrofiber Silver;Absorbent Abdominal Pad;Dry Roll Gauze;Hypafix Tape;Tubigrip 06/23/21 1330   Dressing Changed Changed 06/23/21 1330   Dressing Status Clean;Dry;Intact 06/09/21 1300   Dressing Change/Treatment Frequency Daily, and As Needed 06/23/21  1330   Non-staged Wound Description Full thickness 21   Wound Length (cm) 5.3 cm 21 133   Wound Width (cm) 3.8 cm 21 133   Wound Depth (cm) 0.5 cm 21   Wound Surface Area (cm^2) 20.14 cm^2 21 133   Wound Volume (cm^3) 10.07 cm^3 21 133   Post-Procedure Length (cm) 6.5 cm 21 133   Post-Procedure Width (cm) 5.5 cm 21   Post-Procedure Depth (cm) 0.6 cm 21   Post-Procedure Surface Area (cm^2) 35.75 cm^2 21   Post-Procedure Volume (cm^3) 21.45 cm^3 21 133   Wound Healing % -31171 21   Wound Bed Slough (%) 25 % 21 133   Wound Bed Slough % - (Post-Procedure) 0 % 21 1327   Tunneling (cm) 0 cm 21   Undermining (cm) 0 cm 21   Wound Odor Mild;Foul 21   Exposed Structures None 21                         PROCEDURE: Excisional debridement of left lower extremity wounds   -2% viscous lidocaine applied topically to wound beds for approximately 10 minutes prior to debridement  -4 mm curette used to excise thick layer of slough from all wound beds.  Excisional debridement was performed to remove devitalized tissue from all wounds until healthy, bleeding tissue was visualized.  Total area debrided approximately (100% accuracy of wound measurement difficult due to the diffuse nature of the wounds) 200 cm²  debrided into the subcutaneous layer of all wounds.    -Bleeding controlled with manual pressure.    -Wounds cleansed with normal saline  -Patient tolerated the procedure well, without c/o pain or discomfort.         Pertinent Labs and Diagnostics:    Labs:     A1c:   Lab Results   Component Value Date/Time    HBA1C 5.7 (H) 2020 11:22 AM      Imagin/3/2020-MRI left tibia-fibula with and without contrast  IMPRESSION:     Moderate lateral leg cellulitis and underlying myositis     No abscess or osteomyelitis    VASCULAR STUDIES: BHUPINDER 2019   Right.    Doppler  waveform of the common femoral artery is of high amplitude and    triphasic.    Doppler waveforms at the ankle are brisk and triphasic.    Ankle-brachial index is normal.       Left.    Could not perform ankle-brachial index due to large blisters/ulcers.   Normal toe-brachial index: 0.94   Doppler waveform of the common femoral artery is of high amplitude and    triphasic.    Biphasic waveforms seen at the ankle.     WOUND CULTURE:    11/18/2020: Culture collected in clinic  Culture Result Abnormal   Staphylococcus aureus   Light growth     Culture Result Abnormal   Pseudomonas aeruginosa   Light growth     Culture Result Abnormal   Enterococcus faecalis   Light growth              ASSESSMENT AND PLAN:     1. Skin ulcer of left lower leg with fat layer exposed (HCC)  Comment: Patient has history of recurring ulcerations to left lower extremity.  Has undergone ablation procedures by Dr. White.  Discharged from [facility] in March 2020 due to healing, returned approximately 3 weeks later much worse.  Hospitalized from 4/16 until 5/21/2020 underwent multiple surgical debridements.    06/23/21: Lateral wound appears to be improving somewhat.  Distal/posterior wounds continue to deteriorate, periwound breaking down.    -Excisional debridement of wounds in clinic today, medically necessary to promote wound healing.  Rolled edges taken down to largest wound to left lateral lower extremity again in clinic today.  -Patient underwent bilateral lower extremity arterial ultrasound left superficial femoral, profunda femoral, popliteal and tibial artery stenosis 50 to 75%.  No stenosis to the right leg.  There is no plan for arterial intervention at this time per Dr. Rhodes.    -Patient to return to clinic weekly for assessment and debridement  -Home health to change dressing 4 times per week in between clinic visits.  Patient's daughter is to change dressing Saturday and Sunday.  We will change the patient's dressing while in  clinic 1 time a week.  This will cover the patient for a dressing change every day.   Patient is to change dressing as needed for saturation.    Wound care: Hydrofiber silver, absorbent abdominal pad, dry roll gauze, Hypafix tape, Tubigrip D    2. Chronic venous stasis  Comments: Hemosiderin staining, brawny edema, scarring- findings consistent with CVI.  Pt established at [facility], underwent ablation procedures in 2019.     -Patient was seen by Dr. Rhodes at [facility] no further plans for arterial intervention at this time.  May need to review with Dr. Rhodes if there is any necessity to reevaluate left lower extremity perfusion.    3. Acquired deformity of left ankle and foot  Comments: Charcot deformity of foot and ankle.  Very little ROM of ankle.  Patient was referred to orthopedics previously, however did not make appointment.  -Patient will need to follow-up with orthopedics once wounds are healed.    4.  Wound infection     06/23/21:   Patient historically chronically colonized with Pseudomonas to bilateral lower extremities.  Patient's wound has significantly less pseudomonal growth.  He has completed his Cipro course  -Patient to shower daily with soap and water dry wound beds thoroughly and reapply dressings daily.  -Continue  home health to 4 times a week for additional dressing changes 1 time a week in clinic and his daughter to change on the weekend.    5.  Pain associated with wound    6/23/2021:  -2% viscous lidocaine applied topically to wound bed for approximately 5 minutes prior to debridement  -Patient tolerated procedure today with no complaints of discomfort.    6.  Noncompliance  Comments: Patient has been compliant with showering daily pseudomonal load has significantly decreased.  He has also been making his clinic appointments consistently.    20 min spent  with patient, time spent counseling, coordinating care, reviewing records, discussing POC, educating patient regarding wound healing  and progression.  This time was spent in excess to procedure time.     Please note that this note may have been created using voice recognition software. I have worked with technical experts from [facility], [facility] to optimize the interface.  I have made every reasonable attempt to correct obvious errors, but there may be errors of grammar and possibly content that I did not discover before finalizing the note.    N

## 2021-06-25 ENCOUNTER — OFFICE VISIT (OUTPATIENT)
Dept: WOUND CARE | Facility: MEDICAL CENTER | Age: 66
End: 2021-06-25
Attending: NURSE PRACTITIONER
Payer: MEDICARE

## 2021-06-25 ENCOUNTER — HOSPITAL ENCOUNTER (OUTPATIENT)
Facility: MEDICAL CENTER | Age: 66
End: 2021-06-25
Attending: NURSE PRACTITIONER
Payer: MEDICARE

## 2021-06-25 DIAGNOSIS — M21.962 ACQUIRED DEFORMITY OF LEFT ANKLE AND FOOT: ICD-10-CM

## 2021-06-25 DIAGNOSIS — L08.9 INFECTED SKIN ULCER WITH FAT LAYER EXPOSED (HCC): ICD-10-CM

## 2021-06-25 DIAGNOSIS — L97.919 ULCERS OF BOTH LOWER EXTREMITIES, UNSPECIFIED ULCER STAGE (HCC): ICD-10-CM

## 2021-06-25 DIAGNOSIS — L97.922 SKIN ULCER OF LEFT LOWER LEG WITH FAT LAYER EXPOSED (HCC): ICD-10-CM

## 2021-06-25 DIAGNOSIS — L98.492 INFECTED SKIN ULCER WITH FAT LAYER EXPOSED (HCC): ICD-10-CM

## 2021-06-25 DIAGNOSIS — L97.929 ULCERS OF BOTH LOWER EXTREMITIES, UNSPECIFIED ULCER STAGE (HCC): ICD-10-CM

## 2021-06-25 LAB
AMBIGUOUS DTTM AMBI4: NORMAL
GRAM STN SPEC: NORMAL
SIGNIFICANT IND 70042: NORMAL
SIGNIFICANT IND 70042: NORMAL
SITE SITE: NORMAL
SITE SITE: NORMAL
SOURCE SOURCE: NORMAL
SOURCE SOURCE: NORMAL

## 2021-06-25 PROCEDURE — 87077 CULTURE AEROBIC IDENTIFY: CPT

## 2021-06-25 PROCEDURE — 99214 OFFICE O/P EST MOD 30 MIN: CPT

## 2021-06-25 PROCEDURE — 99213 OFFICE O/P EST LOW 20 MIN: CPT | Performed by: NURSE PRACTITIONER

## 2021-06-25 PROCEDURE — 87070 CULTURE OTHR SPECIMN AEROBIC: CPT

## 2021-06-25 PROCEDURE — 87186 SC STD MICRODIL/AGAR DIL: CPT

## 2021-06-25 PROCEDURE — 87205 SMEAR GRAM STAIN: CPT

## 2021-06-25 NOTE — PROGRESS NOTES
Seen at LPS rounds.  Heavy amount of greenish drainage.  Dressing having to be changed daily.  Wound cx to be obtained.  If cx not resistant and able to treat outpt, then will order MRI of tib/fib without to evaluate for bone infection.  If cx is resistant, then may need direct admission.  Pt aware of plan and agreeable.  Discussed case with Dr. Israel from [facility] and KEV Pham with LPS.

## 2021-06-25 NOTE — PROGRESS NOTES
Pt seen during ortho rounds with LPS team for BLE wounds. Following physician assessment, RN evaluated patient's wound, see flow sheets for details.     CT scan ordered.  Wound culture collected per request of JAG ANGULO Infectious disease.     [place] [phone]     Wound 06/05/20 Full Thickness Wound Leg Left --Left Lateral LE (Active)   Wound Image    06/23/21 1330   Site Assessment Red;Yellow 06/25/21 1026   Periwound Assessment Hemosiderin Staining;Scar tissue;Edema 06/25/21 1026   Margins Epibole (rolled edges);Attached edges 06/25/21 1026   Drainage Amount Copious 06/25/21 1026   Drainage Description Green;Tan 06/25/21 1026   Treatments Cleansed;Site care;Other (Comment) 06/25/21 1026   Wound Cleansing Foam Cleanser/Washcloth 06/25/21 1026   Periwound Protectant Skin Moisturizer;Barrier Paste 06/25/21 1026   Dressing Cleansing/Solutions Not Applicable 06/25/21 1026   Dressing Options Hydrofiber Silver;Absorbent Abdominal Pad;Dry Roll Gauze;Hypafix Tape;Tubigrip 06/25/21 1026   Dressing Changed Changed 05/19/21 1000   Dressing Status Clean;Dry;Intact 04/28/21 1400   Dressing Change/Treatment Frequency Daily, and As Needed 05/19/21 1000   Non-staged Wound Description Full thickness 06/23/21 1330   Wound Length (cm) 15.5 cm 06/23/21 1330   Wound Width (cm) 5.5 cm 06/23/21 1330   Wound Depth (cm) 0.3 cm 06/23/21 1330   Wound Surface Area (cm^2) 85.25 cm^2 06/23/21 1330   Wound Volume (cm^3) 25.58 cm^3 06/23/21 1330   Post-Procedure Length (cm) 15.8 cm 06/23/21 1330   Post-Procedure Width (cm) 5.7 cm 06/23/21 1330   Post-Procedure Depth (cm) 0.3 cm 06/23/21 1330   Post-Procedure Surface Area (cm^2) 90.06 cm^2 06/23/21 1330   Post-Procedure Volume (cm^3) 27.02 cm^3 06/23/21 1330   Wound Healing % 53 06/23/21 1330   Wound Bed Granulation (%) 40 % 05/28/21 1100   Wound Bed Epithelium (%) 90 % 02/10/21 1300   Wound Bed Slough (%) 100 % 06/02/21 1327   Wound  Bed Granulation (%) - Post-Procedure 100 % 05/19/21 1000   Wound Bed Slough % - (Post-Procedure) 90 % 06/16/21 1339   Tunneling (cm) 0 cm 06/25/21 1026   Undermining (cm) 0 cm 06/25/21 1026   Wound Odor Foul 06/25/21 1026   Exposed Structures None 06/25/21 1026       Wound 01/20/21 --Left Medial/Posterior/Lateral LE (Active)   Wound Image      06/23/21 1330   Site Assessment Red;Yellow;Brown 06/25/21 1026   Periwound Assessment Hemosiderin Staining;Maceration;Edema 06/25/21 1026   Margins Epibole (rolled edges) 06/25/21 1026   Closure Secondary intention 02/24/21 1400   Drainage Amount Copious 06/25/21 1026   Drainage Description Green;Yellow 06/25/21 1026   Treatments Cleansed;Site care 06/25/21 1026   Wound Cleansing Foam Cleanser/Washcloth 06/25/21 1026   Periwound Protectant Skin Moisturizer;Barrier Paste 06/25/21 1026   Dressing Cleansing/Solutions Not Applicable 06/25/21 1026   Dressing Options Hydrofiber Silver;Absorbent Abdominal Pad;Dry Roll Gauze;Hypafix Tape;Tubigrip 06/25/21 1026   Dressing Changed Changed 05/19/21 1000   Dressing Status Clean;Dry;Intact 02/24/21 1400   Dressing Change/Treatment Frequency Daily, and As Needed 05/19/21 1000   Non-staged Wound Description Full thickness 06/23/21 1330   Wound Length (cm) 15.5 cm 06/23/21 1330   Wound Width (cm) 15 cm 06/23/21 1330   Wound Depth (cm) 0.5 cm 06/23/21 1330   Wound Surface Area (cm^2) 232.5 cm^2 06/23/21 1330   Wound Volume (cm^3) 116.25 cm^3 06/23/21 1330   Post-Procedure Length (cm) 15.5 cm 06/23/21 1330   Post-Procedure Width (cm) 15 cm 06/23/21 1330   Post-Procedure Depth (cm) 0.6 cm 06/23/21 1330   Post-Procedure Surface Area (cm^2) 232.5 cm^2 06/23/21 1330   Post-Procedure Volume (cm^3) 139.5 cm^3 06/23/21 1330   Wound Healing % -[id number] 06/23/21 1330   Wound Bed Granulation (%) 70 % 05/28/21 1100   Wound Bed Epithelium (%) 0 % 02/10/21 1300   Wound Bed Slough (%) 75 % 06/16/21 1339   Wound Bed Eschar (%) 0 % 02/10/21 1300   Wound Bed  Granulation (%) - Post-Procedure 25 % 06/02/21 1327   Tunneling (cm) 0 cm 06/25/21 1026   Undermining (cm) 0 cm 06/25/21 1026   Wound Odor Foul 06/25/21 1026   Exposed Structures None 06/25/21 1026       Wound 04/11/21 Leg Lower Right (Active)       Wound 04/11/21 Leg Lower Left (Active)       Wound 04/11/21 Heel Right DTI (Active)       Wound 04/28/21 --Right Medial LE (Active)   Wound Image    06/23/21 1330   Site Assessment Yellow;Red 06/25/21 1026   Periwound Assessment Hemosiderin Staining;Edema 06/25/21 1026   Margins Attached edges 06/25/21 1026   Drainage Amount Moderate 06/25/21 1026   Drainage Description Serosanguineous 06/25/21 1026   Treatments Cleansed;Site care 06/25/21 1026   Wound Cleansing Foam Cleanser/Washcloth 06/25/21 1026   Periwound Protectant Skin Moisturizer;Barrier Paste 06/25/21 1026   Dressing Cleansing/Solutions Not Applicable 06/25/21 1026   Dressing Options Hydrofera Blue Ready;Nonadhesive Foam;Dry Roll Gauze;Hypafix Tape;Tubigrip 06/25/21 1026   Dressing Changed Changed 06/09/21 1300   Dressing Status Clean;Dry;Intact 04/28/21 1400   Dressing Change/Treatment Frequency Daily, and As Needed 06/09/21 1300   Non-staged Wound Description Full thickness 06/25/21 1026   Wound Length (cm) 1.4 cm 06/23/21 1330   Wound Width (cm) 1.2 cm 06/23/21 1330   Wound Depth (cm) 0.2 cm 06/23/21 1330   Wound Surface Area (cm^2) 1.68 cm^2 06/23/21 1330   Wound Volume (cm^3) 0.34 cm^3 06/23/21 1330   Post-Procedure Length (cm) 1.4 cm 06/23/21 1330   Post-Procedure Width (cm) 1.3 cm 06/23/21 1330   Post-Procedure Depth (cm) 0.2 cm 06/23/21 1330   Post-Procedure Surface Area (cm^2) 1.82 cm^2 06/23/21 1330   Post-Procedure Volume (cm^3) 0.36 cm^3 06/23/21 1330   Wound Healing % 40 06/23/21 1330   Wound Bed Granulation (%) 80 % 05/19/21 1000   Wound Bed Slough (%) 75 % 06/16/21 1339   Wound Bed Granulation (%) - Post-Procedure 100 % 05/19/21 1000   Wound Bed Slough % - (Post-Procedure) 25 % 06/02/21 1327    Tunneling (cm) 0 cm 06/25/21 1026   Undermining (cm) 0 cm 06/25/21 1026   Wound Odor None 06/25/21 1026   Exposed Structures None 06/25/21 1026       Wound 05/19/21 Venous Ulcer R medial ankle (Active)   Wound Image    06/23/21 1330   Site Assessment Red;Yellow 06/25/21 1026   Periwound Assessment Edema;Fragile 06/25/21 1026   Margins Attached edges 06/25/21 1026   Closure Secondary intention 05/19/21 1000   Drainage Amount Moderate 06/25/21 1026   Drainage Description Serosanguineous 06/25/21 1026   Treatments Cleansed;Site care 06/25/21 1026   Wound Cleansing Foam Cleanser/Washcloth 06/25/21 1026   Periwound Protectant Skin Moisturizer;Barrier Paste 06/25/21 1026   Dressing Cleansing/Solutions Not Applicable 06/25/21 1026   Dressing Options Hydrofera Blue Ready;Nonadhesive Foam;Dry Roll Gauze;Hypafix Tape;Tubigrip 06/25/21 1026   Dressing Changed Changed 06/23/21 1330   Dressing Status Clean;Dry;Intact 06/09/21 1300   Dressing Change/Treatment Frequency Daily, and As Needed 06/23/21 1330   Non-staged Wound Description Full thickness 06/25/21 1026   Wound Length (cm) 2.6 cm 06/23/21 1330   Wound Width (cm) 1.3 cm 06/23/21 1330   Wound Depth (cm) 0.3 cm 06/23/21 1330   Wound Surface Area (cm^2) 3.38 cm^2 06/23/21 1330   Wound Volume (cm^3) 1.01 cm^3 06/23/21 1330   Post-Procedure Length (cm) 2.6 cm 06/23/21 1330   Post-Procedure Width (cm) 1.4 cm 06/23/21 1330   Post-Procedure Depth (cm) 0.3 cm 06/23/21 1330   Post-Procedure Surface Area (cm^2) 3.64 cm^2 06/23/21 1330   Post-Procedure Volume (cm^3) 1.09 cm^3 06/23/21 1330   Wound Healing % -1162 06/23/21 1330   Wound Bed Granulation (%) 80 % 05/19/21 1000   Wound Bed Slough (%) 75 % 06/16/21 1339   Wound Bed Granulation (%) - Post-Procedure 100 % 05/19/21 1000   Wound Bed Slough % - (Post-Procedure) 0 % 06/02/21 1327   Tunneling (cm) 0 cm 06/25/21 1026   Undermining (cm) 0 cm 06/25/21 1026   Wound Odor None 06/25/21 1026   Exposed Structures None 06/25/21 1026        Wound 05/28/21 LLE Medial Distal (Active)   Wound Image    06/23/21 1330   Site Assessment Red;Yellow 06/25/21 1026   Periwound Assessment Edema;Fragile;Denuded 06/25/21 1026   Margins Epibole (rolled edges) 06/25/21 1026   Drainage Amount Large 06/25/21 1026   Drainage Description Green;Tan 06/25/21 1026   Treatments Cleansed;Site care 06/25/21 1026   Wound Cleansing Foam Cleanser/Washcloth 06/25/21 1026   Periwound Protectant Skin Moisturizer;Barrier Paste 06/25/21 1026   Dressing Cleansing/Solutions Not Applicable 06/25/21 1026   Dressing Options Hydrofiber Silver;Absorbent Abdominal Pad;Dry Roll Gauze;Hypafix Tape;Tubigrip 06/25/21 1026   Dressing Changed Changed 06/23/21 1330   Dressing Status Clean;Dry;Intact 06/09/21 1300   Dressing Change/Treatment Frequency Daily, and As Needed 06/23/21 1330   Non-staged Wound Description Full thickness 06/25/21 1026   Wound Length (cm) 5.3 cm 06/23/21 1330   Wound Width (cm) 3.8 cm 06/23/21 1330   Wound Depth (cm) 0.5 cm 06/23/21 1330   Wound Surface Area (cm^2) 20.14 cm^2 06/23/21 1330   Wound Volume (cm^3) 10.07 cm^3 06/23/21 1330   Post-Procedure Length (cm) 6.5 cm 06/23/21 1330   Post-Procedure Width (cm) 5.5 cm 06/23/21 1330   Post-Procedure Depth (cm) 0.6 cm 06/23/21 1330   Post-Procedure Surface Area (cm^2) 35.75 cm^2 06/23/21 1330   Post-Procedure Volume (cm^3) 21.45 cm^3 06/23/21 1330   Wound Healing % -01107 06/23/21 1330   Wound Bed Slough (%) 25 % 06/16/21 1339   Wound Bed Slough % - (Post-Procedure) 0 % 06/02/21 1327   Tunneling (cm) 0 cm 06/25/21 1026   Undermining (cm) 0 cm 06/25/21 1026   Wound Odor Foul 06/25/21 1026   Exposed Structures None 06/25/21 1026

## 2021-06-25 NOTE — PROGRESS NOTES
LIMB PRESERVATION SERVICE ROUNDS    Patient seen in collaboration with interdisciplinary team during LPS rounds in wound clinic for evaluation of recurrent ulcers to left lower leg and medial ankle. Patient also has developed ulcers to right lower leg which reoccurred approximately April 2021. The left leg ulcers have been difficult to heal, he has had multiple courses of antibiotics. He was seen on LPS rounds for evaluation of possible surgical debridement and skin substitute application for LLE.    6/25/2021: Patient presents with purulent filled blister to right proximal anterior leg which he noticed today. Patient reports he is having heavy green drainage from his left leg dressings that are being changed almost daily by home health and outpatient wound care clinic.  Denies fevers, chills, nausea, vomiting.        RESULTS:    X-ray left foot: 4/11/2021  1.  Osteoarthritis of the midfoot and forefoot     2.  Flattening of the longitudinal arch     3.  Multiple hammertoe deformities  X-ray right foot: 4/11/2021:   1.  No radiographic evidence for calcaneal osteomyelitis     2.  osteoarthritis of the midfoot, forefoot, tibiotalar joint     3.  Multiple hammertoe deformities    4/12/2021  Left superficial femoral, profunda femoral, popliteal and tibial artery    stenoses 50-75%.   Right no stenosis.     RIGHT      Waveform            Systolic BPs (mmHg)                              140           Brachial   Triphasic                                Common Femoral   Triphasic                  170           Posterior Tibial   Bi, non-                   169           Dorsalis Pedis   reversed                                            Peroneal                              1.21          BHUPINDER                                            TBI                           LEFT   Waveform        Systolic BPs (mmHg)                              139           Brachial   Triphasic                                Common Femoral    Biphasic                   170           Posterior Tibial   Biphasic                   160           Dorsalis Pedis                                            Peroneal                              1.21          BHUPINDER                                            TBI         Findings   Right.    Doppler waveforms of the common femoral, popliteal, posterior tibial    arteries are of high amplitude and triphasic.    Doppler waveform of the dorsalis pedis artery is of high amplitude and    biphasic.    Ankle-brachial index is normal.    Toe-brachial index is normal.    Right: 1.22      Left.    Doppler waveform of the common femoral artery is of high amplitude and    triphasic.    Doppler waveforms of the popliteal, posterior tibial and dorsalis pedis    arteries are of high amplitude and biphasic.    Ankle-brachial index is normal.    Toe-brachial index is normal.    Left TBI: 1.16        Lab Results   Component Value Date/Time    HBA1C 5.7 (H) 01/23/2020 11:22 AM            OBJECTIVE FINDINGS:     Pulses: palpable pedal pulses    Wound:  Left lower leg ulcers:  Increased erythema to lower leg with heavy green foul-smelling drainage    PROCEDURE   Wound culture collected by wound RN. Wound care completed by wound RN. See note.     Surgical and non surgical options discussed by Dr. Israel. Currently patient presents with  decline in ulcers, increased erythema, drainage, edema. Will start with wound culture. Pending wound culture if patient requires IV antibiotics will admit to hospital and obtain MRI of tib-fib without contrast.  Surgical options to include I&D with VAC application pending further imaging and lab work.      PLAN:   Wound Care: Continue at [facility]. Updated home health orders sent to [facility] by wound care RN.  Imaging/Labs: Infectious disease to follow wound culture. ID to order MRI tib-fib without pending wound culture results  Offloading: Continue to wear protective footwear  Antibiotics:  Pending wound culture results. ID involved  Surgery: None planned at this time. Will require further imaging or lab work prior to definitive surgical plan. If wound culture is positive with no oral options, plan to admit to hospital. At that time will obtain MRI tib-fib  without and possible  left lower leg I&D depending on imaging results  Referral: N/A      LPS Follow up: no further LPS appts at this time.     20 minutes was spent on preparation for visit, examination, counseling and coordination of care regarding the above.      Please note that this dictation was created using voice recognition software. I have  worked with technical experts from [facility] to optimize the interface.  I have made every reasonable attempt to correct obvious errors, but there may be errors of grammar and possibly content that I did not discover before finalizing the note.

## 2021-06-29 ENCOUNTER — TELEPHONE (OUTPATIENT)
Dept: INFECTIOUS DISEASES | Facility: MEDICAL CENTER | Age: 66
End: 2021-06-29

## 2021-06-29 DIAGNOSIS — T14.8XXA WOUND INFECTION: ICD-10-CM

## 2021-06-29 DIAGNOSIS — M86.9 OSTEOMYELITIS OF LEFT TIBIA, UNSPECIFIED TYPE (HCC): ICD-10-CM

## 2021-06-29 DIAGNOSIS — A49.8 PSEUDOMONAS INFECTION: ICD-10-CM

## 2021-06-29 DIAGNOSIS — B95.0 GROUP A STREPTOCOCCAL INFECTION: ICD-10-CM

## 2021-06-29 DIAGNOSIS — L08.9 WOUND INFECTION: ICD-10-CM

## 2021-06-29 RX ORDER — CIPROFLOXACIN 750 MG/1
750 TABLET, FILM COATED ORAL 2 TIMES DAILY
Qty: 28 TABLET | Refills: 0 | Status: SHIPPED | OUTPATIENT
Start: 2021-06-29 | End: 2021-07-13

## 2021-06-29 RX ORDER — AMOXICILLIN AND CLAVULANATE POTASSIUM 875; 125 MG/1; MG/1
1 TABLET, FILM COATED ORAL 2 TIMES DAILY
Qty: 28 TABLET | Refills: 0 | Status: SHIPPED | OUTPATIENT
Start: 2021-06-29 | End: 2021-07-13

## 2021-06-29 NOTE — TELEPHONE ENCOUNTER
LVM for patient to return my call for scheduling-MRI orders and advised Nurse Practitioner has reviewed cultures and sent in abx

## 2021-06-29 NOTE — PROGRESS NOTES
Wound culture taken on 6/25+ Pseudomonas (Cipro sensitive) and group B strep.  Will start him on a 14-day course of p.o. ciprofloxacin 750 mg twice daily and Augmentin 875/125 mg twice daily.  MRI tib/fib w/o ordered to R/O OM as pt has had persistent wounds to LLE since 2016 without complete resolution.  Everytime he gets near wound resolution, the wound begins to breakdown before he can finish healing.  Follow-up with ID on 7/14, will see over a wound care appointment.

## 2021-06-29 NOTE — TELEPHONE ENCOUNTER
----- Message from GREG Díaz sent at 6/29/2021  9:57 AM PDT -----  Please call and let him know I sent in the prescription for the ciprofloxacin and Augmentin x14 days.  Will see him on 7/14 at wound care.  MRI ordered stat.

## 2021-06-30 ENCOUNTER — OFFICE VISIT (OUTPATIENT)
Dept: WOUND CARE | Facility: MEDICAL CENTER | Age: 66
End: 2021-06-30
Attending: NURSE PRACTITIONER
Payer: MEDICARE

## 2021-06-30 VITALS
TEMPERATURE: 97.5 F | OXYGEN SATURATION: 94 % | SYSTOLIC BLOOD PRESSURE: 117 MMHG | HEART RATE: 71 BPM | RESPIRATION RATE: 20 BRPM | DIASTOLIC BLOOD PRESSURE: 89 MMHG

## 2021-06-30 DIAGNOSIS — L08.9 WOUND INFECTION: ICD-10-CM

## 2021-06-30 DIAGNOSIS — I87.8 CHRONIC VENOUS STASIS: ICD-10-CM

## 2021-06-30 DIAGNOSIS — M21.962 ACQUIRED DEFORMITY OF LEFT ANKLE AND FOOT: ICD-10-CM

## 2021-06-30 DIAGNOSIS — Z91.199 NONCOMPLIANCE: ICD-10-CM

## 2021-06-30 DIAGNOSIS — T14.8XXA WOUND INFECTION: ICD-10-CM

## 2021-06-30 DIAGNOSIS — T14.8XXA PAIN ASSOCIATED WITH WOUND: ICD-10-CM

## 2021-06-30 DIAGNOSIS — L97.922 SKIN ULCER OF LEFT LOWER LEG WITH FAT LAYER EXPOSED (HCC): Primary | ICD-10-CM

## 2021-06-30 DIAGNOSIS — R52 PAIN ASSOCIATED WITH WOUND: ICD-10-CM

## 2021-06-30 PROCEDURE — 11042 DBRDMT SUBQ TIS 1ST 20SQCM/<: CPT

## 2021-06-30 PROCEDURE — 11045 DBRDMT SUBQ TISS EACH ADDL: CPT

## 2021-06-30 PROCEDURE — 11042 DBRDMT SUBQ TIS 1ST 20SQCM/<: CPT | Performed by: NURSE PRACTITIONER

## 2021-06-30 PROCEDURE — 99214 OFFICE O/P EST MOD 30 MIN: CPT | Mod: 25

## 2021-06-30 PROCEDURE — 11045 DBRDMT SUBQ TISS EACH ADDL: CPT | Performed by: NURSE PRACTITIONER

## 2021-06-30 PROCEDURE — 99214 OFFICE O/P EST MOD 30 MIN: CPT | Mod: 25 | Performed by: NURSE PRACTITIONER

## 2021-06-30 ASSESSMENT — ENCOUNTER SYMPTOMS
VOMITING: 0
FEVER: 0
CLAUDICATION: 0
PALPITATIONS: 0
HEADACHES: 0
DEPRESSION: 1
WHEEZING: 0
DIZZINESS: 0
MYALGIAS: 0
WEAKNESS: 0
SHORTNESS OF BREATH: 0
CHILLS: 0
COUGH: 0
NAUSEA: 0

## 2021-06-30 ASSESSMENT — PAIN SCALES - GENERAL: PAINLEVEL: NO PAIN

## 2021-06-30 NOTE — PROGRESS NOTES
Provider Encounter- Full Thickness wound    HISTORY OF PRESENT ILLNESS  Wound History:    START OF CARE IN CLINIC: 5/20/2020               REFERRING PROVIDER: KEV Keith                 WOUND ETIOLOGY: venous,  likely mixed etiology              LOCATION: Left lower leg, multiple wounds                                   Right lower leg, multiple wounds-first observed in clinic on initial evaluation, 6/5/2020.  Resolved 9/2/2020                HISTORY: Patient is very well-known to this clinic from previous treatment of wounds to his left lower extremity.  He was discharged from the clinic in March 2020 due to full resolution of his wound.  He returned to the clinic approximately 3 weeks later, on 4/16, with cellulitis, edema, and large open wounds to his left lower leg, and was sent directly to [facility] emergency room.  He was admitted for IV antibiotics and surgical intervention.  On 4/22/2020, he underwent an I&D of his left lower extremity, fasciotomy, and VAC placement.  In the following weeks he underwent surgery 3 more times for irrigation and debridement.  He was discharged home on 5/21, on the VAC, with home health and a referral to [facility].      4/21/2021: Patient was recently discharged from [facility] following being sent from the wound care clinic.  There the patient received IV antibiotics and underwent arterial imaging.  Patient returns to [facility] for care of bilateral lower extremity wounds.      Pertinent Medical History:  Anxiety, Charcot's joint of left foot, non-diabetic (3/21/2016), Chronic congestive heart failure (HCC) (11/16/2017), Hepatitis C, chronic ([facility]), Hypertension, Migraine, Polysubstance abuse ([facility]) (3/8/2018), Tobacco use (4/18/2016), Ulcer of left lower extremity with necrosis of muscle ([facility]) (3/21/2016), and Venous stasis ulcer ([facility]) (2017).                ETIOLOGY HISTORY:  Vascular Surgeon: Dr. White. Compression Circ-aid. Varicose Veins none  visible     TOBACCO USE: Patient denies; patient also denies alcohol and recreational drug use    Patient's problem list, allergies, and current medications reviewed and updated in Epic    Interval History:  Interval History thinned 2/10/2021.  Please see previous notes for complete interval history.       4/21/2021: Clinic visit with KEV Luna. Patient states that they are feeling well today.  Patient denies fever, chills, nausea, vomiting, lightheadedness, dizziness, shortness of breath and chest pain.     4/28/2021: Clinic visit with KEV Luna. Patient states that they are feeling well today.  Patient denies fever, chills, nausea, vomiting, lightheadedness, dizziness, shortness of breath and chest pain.  Patient reports that home health came on Friday and Saturday of last week and not the 4 times as ordered.  Patient also reports that he took a shower every day as instructed.  Later in the conversation the patient stated that he only took a shower on the days that home health came which is not every day.  We also called home health who went to the patient's home all 4 times as ordered however the patient did not answer the door.  Home health left bandages for patient to dress his wound.  Patient reports no bandages were left.  Patient is noncompliant with plan of care and noncompliant with home health assistance.  Patient's wounds are filled with Pseudomonas new wounds to the right lower extremity.    5/5/2021 : Clinic visit with KEV Brantley, RYAN, DONNIEN, CFBARBER.  Kodi states he is feeling well overall, denies fevers, chills, nausea, vomiting, cough or shortness of breath.  He has been taking his Cipro as prescribed, states he has 2 more pills left, will be completing these tomorrow morning.  Home health continues to see him daily during the week for dressing changes.  His daughter is changing his dressing on the weekends.  Is also been showering daily washing his  legs.    5/12/2021: Clinic visit with KEV Luna. Patient states that they are feeling well today.  Patient denies fever, chills, nausea, vomiting, lightheadedness, dizziness, shortness of breath and chest pain.  Patient reports that he has been taking showers daily as instructed.  Patient's pseudomonal load has significantly decreased left lower extremity is improving wounds are more shallow.  Wound to right lower extremity improving.    5/19/2021: Clinic visit with KEV Luna. Patient states that they are feeling well today.  Patient denies fever, chills, nausea, vomiting, lightheadedness, dizziness, shortness of breath and chest pain. Patients wound beds have improved with minimal pseudomonal levels.  Patient reports that he is showering every day.  The quality of the tissue has also improved a great deal with the largest wound bed decreasing in dimensions.  Continue current plan of care.    5/28/2021 : Clinic visit with Dr. Ayon.  Returns for a follow-up visit has questions if there is any soap that we recommend advised him not to use antibacterial soap but good gentle soap and lots of water.  He denies chills fever headache cough chest pain abdominal pain or other acute medical issues.    6/2/2021: Clinic visit with KEV Luna. Patient states that they are feeling well today.  Patient denies fever, chills, nausea, vomiting, lightheadedness, dizziness, shortness of breath and chest pain.  Patient's pseudomonal infection is worsening.  Patient is known to be chronically colonized.  Patient's wounds are deteriorating.  Patient has new wounds developing.  Therefore I have ordered the patient a 10-day course of ciprofloxacin.  Patient is to continue to take a shower daily with soap and water.  Rolled edges taken down in clinic today to the large left lateral wound.    6/9/2021: Clinic visit with KEV Luna. Patient states that they are feeling well today.  Patient  denies fever, chills, nausea, vomiting, lightheadedness, dizziness, shortness of breath and chest pain.  Patient's wounds are becoming more fibrotic in nature.  Patient has lost granulation tissue to the superior left lower lateral wound.  Patient is finishing up his course of ciprofloxacin patient should finish course on on 6/12/2020.    6/16/2021: Clinic visit with KEV Luna. Patient states that they are feeling well today.  Patient denies fever, chills, nausea, vomiting, lightheadedness, dizziness, shortness of breath and chest pain.  Patient has done a good job at hygiene decreasing pseudomonal load.  Patient still has evidence of Pseudomonas growth to left lower extremity significantly improved however.  Patient's tissue is fibrotic in nature.  Patient has a follow-up appointment next Friday in LPS rounds.  Patient will need at minimum in my opinion surgical debridement to get below fibrous tissue and restart the granulation process.  Patient has completed his course of ciprofloxacin.  Please review arterial ultrasound with Dr. Rhodes query 50 to 75% stenosis could this be contributing to nonhealing wounds according to the arteries affected?    6/23/2021 : Clinic visit with KEV Brantley, FNP-BC, CWOCN, CFCN.  Goran states he is feeling well overall.  [facility] continues to see him daily for dressing changes in between clinic visits.  He states his daughter is also helping with dressing changes.  He is showering daily.  He is scheduled to be seen in LPS rounds on Friday.    6/30/2021: Clinic visit with KEV Luna. Patient states that they are feeling well today.  Patient denies fever, chills, nausea, vomiting, lightheadedness, dizziness, shortness of breath and chest pain.  Patient was placed on ciprofloxacin and Augmentin by Jennifer Robb infectious disease specialist, due to a positive culture on 6/25/2021 for heavy growth of Pseudomonas and moderate growth of  Streptococcus pyogenes.  Patient informed in clinic today that he needs to  both antibiotics and initiate treatment this evening.  MRI was also placed by ID.  We had the patient call imaging, in clinic, patient has MRI scheduled for July 9.    I&D to be considered if imaging is negative.  Patient will need surgical debridement.  Clinical debridement is insufficient due to the significant area of the patient's wound, fibrotic nature and thick epibole edges.  Following MRI results, will discuss with Dr. Israel regarding possible surgical debridement.    REVIEW OF SYSTEMS:   Review of Systems   Constitutional: Negative for chills and fever.        States he is eating well, appetite normal   Respiratory: Negative for cough, shortness of breath and wheezing.    Cardiovascular: Positive for leg swelling. Negative for chest pain, palpitations and claudication.        Chronic swelling in legs, for several years   Gastrointestinal: Negative for nausea and vomiting.   Musculoskeletal: Positive for joint pain. Negative for myalgias.        Chronic   Skin:        Multiple diffuse open wounds to left lower extremity a few new open wounds to right lower extremity.  Pseudomonal drainage to bilateral lower extremities.    Neurological: Negative for dizziness, weakness and headaches.   Psychiatric/Behavioral: Positive for depression.       PHYSICAL EXAMINATION:   /89   Pulse 71   Temp 36.4 °C (97.5 °F) (Temporal)   Resp 20   SpO2 94%     Physical Exam  Constitutional:       General: He is not in acute distress.     Appearance: He is not diaphoretic.   HENT:      Head: Normocephalic and atraumatic.   Eyes:      Conjunctiva/sclera: Conjunctivae normal.      Pupils: Pupils are equal, round, and reactive to light.   Pulmonary:      Effort: Pulmonary effort is normal. No respiratory distress.      Breath sounds: No wheezing.   Musculoskeletal:         General: Deformity present. Normal range of motion.      Cervical  back: Neck supple.      Comments: Left charcot foot and ankle     Skin:     General: Skin is warm.      Comments: Large full-thickness wounds to lower extremities  Patient colonized with Pseudomonas to bilateral lower extremities.    Neurological:      Mental Status: He is alert and oriented to person, place, and time.   Psychiatric:         Mood and Affect: Mood and affect normal.         Judgment: Judgment normal.                                                           WOUND ASSESSMENT          PROCEDURE: Excisional debridement of left lower extremity wounds   -2% viscous lidocaine applied topically to wound beds for approximately 10 minutes prior to debridement  -4 mm curette used to excise thick layer of slough from all wound beds.  Excisional debridement was performed to remove devitalized tissue from all wounds until healthy, bleeding tissue was visualized.  Total area debrided approximately (100% accuracy of wound measurement difficult due to the diffuse nature of the wounds) 200 cm²  debrided into the subcutaneous layer of all wounds.    -Bleeding controlled with manual pressure.    -Wounds cleansed with normal saline  -Patient tolerated the procedure well, without c/o pain or discomfort.         Pertinent Labs and Diagnostics:    Labs:     A1c:   Lab Results   Component Value Date/Time    HBA1C 5.7 (H) 2020 11:22 AM      Imagin/3/2020-MRI left tibia-fibula with and without contrast  IMPRESSION:     Moderate lateral leg cellulitis and underlying myositis     No abscess or osteomyelitis    VASCULAR STUDIES: BHUPINDER 2019   Right.    Doppler waveform of the common femoral artery is of high amplitude and    triphasic.    Doppler waveforms at the ankle are brisk and triphasic.    Ankle-brachial index is normal.       Left.    Could not perform ankle-brachial index due to large blisters/ulcers.   Normal toe-brachial index: 0.94   Doppler waveform of the common femoral artery is of high amplitude and     triphasic.    Biphasic waveforms seen at the ankle.     WOUND CULTURE:    11/18/2020: Culture collected in clinic  Culture Result Abnormal   Staphylococcus aureus   Light growth     Culture Result Abnormal   Pseudomonas aeruginosa   Light growth     Culture Result Abnormal   Enterococcus faecalis   Light growth              ASSESSMENT AND PLAN:     1. Skin ulcer of left lower leg with fat layer exposed (HCC)  Comment: Patient has history of recurring ulcerations to left lower extremity.  Has undergone ablation procedures by Dr. White.  Discharged from [facility] in March 2020 due to healing, returned approximately 3 weeks later much worse.  Hospitalized from 4/16 until 5/21/2020 underwent multiple surgical debridements.    06/30/21: Wounds are not progressing at this time.  Patient will need definitive surgical debridement following negative MRI of left lower extremity to promote wound healing.  -Excisional debridement of wounds in clinic today, necessary to help control slough.    -Patient underwent bilateral lower extremity arterial ultrasound left superficial femoral, profunda femoral, popliteal and tibial artery stenosis 50 to 75%.  No stenosis to the right leg.  There is no plan for arterial intervention at this time per Dr. Rhodes.    -Patient to return to clinic weekly for assessment and debridement  -Even with home health visits x4 weekly and family assistance patient's left lower extremity has severe pseudomonal growth.  Patient reports that he is still showering daily with Zest soap and water.  See interval history for antibiotic therapy.  Patient is to change dressing as needed for saturation.    Wound care: Hydrofiber silver, absorbent abdominal pad, dry roll gauze, Hypafix tape, Tubigrip D    2. Chronic venous stasis  Comments: Hemosiderin staining, brawny edema, scarring- findings consistent with CVI.  Pt established at [facility], underwent ablation procedures in 2019.     -Patient was seen by   Carmelo at [facility] no further plans for arterial intervention at this time.  May need to review with Dr. Rhodes if there is any necessity to reevaluate left lower extremity perfusion.    3. Acquired deformity of left ankle and foot  Comments: Charcot deformity of foot and ankle.  Very little ROM of ankle.  Patient was referred to orthopedics previously, however did not make appointment.  -Patient will need to follow-up with orthopedics once wounds are healed.    4.  Wound infection     06/30/21:   Patient historically chronically colonized with Pseudomonas to bilateral lower extremities.  Patient's left lower extremity wounds are again saturated with Pseudomonas see interval history above  -Patient to shower daily with soap and water dry wound beds thoroughly and reapply dressings daily.  -Patient is currently on Cipro and Augmentin see above  -Continue  home health to 4 times a week for additional dressing changes 1 time a week in clinic and his daughter to change on the weekend.    5.  Pain associated with wound    6/30/2021:  -2% viscous lidocaine applied topically to wound bed for approximately 5 minutes prior to debridement  -Patient tolerated procedure today with no complaints of discomfort.    6.  Noncompliance  Comments: Patient reports that he is compliant with showering daily with Zest soap and trying reapplying dressing    30 min spent  with patient, time spent counseling, coordinating care, reviewing records, discussing POC, educating patient regarding wound healing and progression.  This time was spent in excess to procedure time.     Please note that this note may have been created using voice recognition software. I have worked with technical experts from [employer] to optimize the interface.  I have made every reasonable attempt to correct obvious errors, but there may be errors of grammar and possibly content that I did not discover before finalizing the note.    N

## 2021-06-30 NOTE — TELEPHONE ENCOUNTER
Called patient and went straight to voicemail again. Left him a detailed message with Imaging phone number as well. Patient is scheduled at wound care this afternoon I will let Jennifer know to reach out to one of the providers.

## 2021-07-07 ENCOUNTER — OFFICE VISIT (OUTPATIENT)
Dept: WOUND CARE | Facility: MEDICAL CENTER | Age: 66
End: 2021-07-07
Attending: NURSE PRACTITIONER
Payer: MEDICARE

## 2021-07-07 VITALS
RESPIRATION RATE: 16 BRPM | TEMPERATURE: 97.8 F | OXYGEN SATURATION: 97 % | HEART RATE: 79 BPM | SYSTOLIC BLOOD PRESSURE: 133 MMHG | DIASTOLIC BLOOD PRESSURE: 93 MMHG

## 2021-07-07 DIAGNOSIS — L97.922 SKIN ULCER OF LEFT LOWER LEG WITH FAT LAYER EXPOSED (HCC): Primary | ICD-10-CM

## 2021-07-07 PROCEDURE — 11042 DBRDMT SUBQ TIS 1ST 20SQCM/<: CPT

## 2021-07-07 PROCEDURE — 11045 DBRDMT SUBQ TISS EACH ADDL: CPT

## 2021-07-07 PROCEDURE — 99213 OFFICE O/P EST LOW 20 MIN: CPT

## 2021-07-07 PROCEDURE — 11045 DBRDMT SUBQ TISS EACH ADDL: CPT | Performed by: NURSE PRACTITIONER

## 2021-07-07 PROCEDURE — 11042 DBRDMT SUBQ TIS 1ST 20SQCM/<: CPT | Performed by: NURSE PRACTITIONER

## 2021-07-07 ASSESSMENT — ENCOUNTER SYMPTOMS
CHILLS: 0
WEAKNESS: 0
WHEEZING: 0
DEPRESSION: 1
SHORTNESS OF BREATH: 0
PALPITATIONS: 0
VOMITING: 0
FEVER: 0
COUGH: 0
MYALGIAS: 0
CLAUDICATION: 0
HEADACHES: 0
DIZZINESS: 0
NAUSEA: 0

## 2021-07-07 NOTE — PROGRESS NOTES
Provider Encounter- Full Thickness wound    HISTORY OF PRESENT ILLNESS  Wound History:    START OF CARE IN CLINIC: 5/20/2020               REFERRING PROVIDER: KEV Keith                 WOUND ETIOLOGY: venous,  likely mixed etiology              LOCATION: Left lower leg, multiple wounds                                   Right lower leg, multiple wounds-first observed in clinic on initial evaluation, 6/5/2020.  Resolved 9/2/2020                HISTORY: Patient is very well-known to this clinic from previous treatment of wounds to his left lower extremity.  He was discharged from the clinic in March 2020 due to full resolution of his wound.  He returned to the clinic approximately 3 weeks later, on 4/16, with cellulitis, edema, and large open wounds to his left lower leg, and was sent directly to [facility] emergency room.  He was admitted for IV antibiotics and surgical intervention.  On 4/22/2020, he underwent an I&D of his left lower extremity, fasciotomy, and VAC placement.  In the following weeks he underwent surgery 3 more times for irrigation and debridement.  He was discharged home on 5/21, on the VAC, with home health and a referral to [facility].      4/21/2021: Patient was recently discharged from [facility] following being sent from the wound care clinic.  There the patient received IV antibiotics and underwent arterial imaging.  Patient returns to [facility] for care of bilateral lower extremity wounds.      Pertinent Medical History:  Anxiety, Charcot's joint of left foot, non-diabetic (3/21/2016), Chronic congestive heart failure (HCC) (11/16/2017), Hepatitis C, chronic ([facility]), Hypertension, Migraine, Polysubstance abuse ([facility]) (3/8/2018), Tobacco use (4/18/2016), Ulcer of left lower extremity with necrosis of muscle ([facility]) (3/21/2016), and Venous stasis ulcer ([facility]) (2017).                ETIOLOGY HISTORY:  Vascular Surgeon: Dr. White. Compression Circ-aid. Varicose Veins none  visible     TOBACCO USE: Patient denies; patient also denies alcohol and recreational drug use    Patient's problem list, allergies, and current medications reviewed and updated in Epic    Interval History:  Interval History thinned 2/10/2021.  Please see previous notes for complete interval history.       4/21/2021: Clinic visit with KEV Luna. Patient states that they are feeling well today.  Patient denies fever, chills, nausea, vomiting, lightheadedness, dizziness, shortness of breath and chest pain.     4/28/2021: Clinic visit with KEV Luna. Patient states that they are feeling well today.  Patient denies fever, chills, nausea, vomiting, lightheadedness, dizziness, shortness of breath and chest pain.  Patient reports that home health came on Friday and Saturday of last week and not the 4 times as ordered.  Patient also reports that he took a shower every day as instructed.  Later in the conversation the patient stated that he only took a shower on the days that home health came which is not every day.  We also called home health who went to the patient's home all 4 times as ordered however the patient did not answer the door.  Home health left bandages for patient to dress his wound.  Patient reports no bandages were left.  Patient is noncompliant with plan of care and noncompliant with home health assistance.  Patient's wounds are filled with Pseudomonas new wounds to the right lower extremity.    5/5/2021 : Clinic visit with KEV Brantley, RYAN, DONNIEN, CFBARBER.  Kodi states he is feeling well overall, denies fevers, chills, nausea, vomiting, cough or shortness of breath.  He has been taking his Cipro as prescribed, states he has 2 more pills left, will be completing these tomorrow morning.  Home health continues to see him daily during the week for dressing changes.  His daughter is changing his dressing on the weekends.  Is also been showering daily washing his  legs.    5/12/2021: Clinic visit with KEV Luna. Patient states that they are feeling well today.  Patient denies fever, chills, nausea, vomiting, lightheadedness, dizziness, shortness of breath and chest pain.  Patient reports that he has been taking showers daily as instructed.  Patient's pseudomonal load has significantly decreased left lower extremity is improving wounds are more shallow.  Wound to right lower extremity improving.    5/19/2021: Clinic visit with KEV Luna. Patient states that they are feeling well today.  Patient denies fever, chills, nausea, vomiting, lightheadedness, dizziness, shortness of breath and chest pain. Patients wound beds have improved with minimal pseudomonal levels.  Patient reports that he is showering every day.  The quality of the tissue has also improved a great deal with the largest wound bed decreasing in dimensions.  Continue current plan of care.    5/28/2021 : Clinic visit with Dr. Ayon.  Returns for a follow-up visit has questions if there is any soap that we recommend advised him not to use antibacterial soap but good gentle soap and lots of water.  He denies chills fever headache cough chest pain abdominal pain or other acute medical issues.    6/2/2021: Clinic visit with KEV Luna. Patient states that they are feeling well today.  Patient denies fever, chills, nausea, vomiting, lightheadedness, dizziness, shortness of breath and chest pain.  Patient's pseudomonal infection is worsening.  Patient is known to be chronically colonized.  Patient's wounds are deteriorating.  Patient has new wounds developing.  Therefore I have ordered the patient a 10-day course of ciprofloxacin.  Patient is to continue to take a shower daily with soap and water.  Rolled edges taken down in clinic today to the large left lateral wound.    6/9/2021: Clinic visit with KEV Luna. Patient states that they are feeling well today.  Patient  denies fever, chills, nausea, vomiting, lightheadedness, dizziness, shortness of breath and chest pain.  Patient's wounds are becoming more fibrotic in nature.  Patient has lost granulation tissue to the superior left lower lateral wound.  Patient is finishing up his course of ciprofloxacin patient should finish course on on 6/12/2020.    6/16/2021: Clinic visit with KEV Luna. Patient states that they are feeling well today.  Patient denies fever, chills, nausea, vomiting, lightheadedness, dizziness, shortness of breath and chest pain.  Patient has done a good job at hygiene decreasing pseudomonal load.  Patient still has evidence of Pseudomonas growth to left lower extremity significantly improved however.  Patient's tissue is fibrotic in nature.  Patient has a follow-up appointment next Friday in LPS rounds.  Patient will need at minimum in my opinion surgical debridement to get below fibrous tissue and restart the granulation process.  Patient has completed his course of ciprofloxacin.  Please review arterial ultrasound with Dr. Rhodes query 50 to 75% stenosis could this be contributing to nonhealing wounds according to the arteries affected?    6/23/2021 : Clinic visit with KEV Brantley, FNP-BC, CWOCN, CFCN.  Goran states he is feeling well overall.  [facility] continues to see him daily for dressing changes in between clinic visits.  He states his daughter is also helping with dressing changes.  He is showering daily.  He is scheduled to be seen in LPS rounds on Friday.    6/30/2021: Clinic visit with KEV Luna. Patient states that they are feeling well today.  Patient denies fever, chills, nausea, vomiting, lightheadedness, dizziness, shortness of breath and chest pain.  Patient was placed on ciprofloxacin and Augmentin by Jennifer Robb infectious disease specialist, due to a positive culture on 6/25/2021 for heavy growth of Pseudomonas and moderate growth of  Streptococcus pyogenes.  Patient informed in clinic today that he needs to  both antibiotics and initiate treatment this evening.  MRI was also placed by ID.  We had the patient call imaging, in clinic, patient has MRI scheduled for July 9.    I&D to be considered if imaging is negative.  Patient will need surgical debridement.  Clinical debridement is insufficient due to the significant area of the patient's wound, fibrotic nature and thick epibole edges.  Following MRI results, will discuss with Dr. Israel regarding possible surgical debridement.    7/7/2021: Clinic visit with KEV Luna. Patient states that they are feeling well today.  Patient denies fever, chills, nausea, vomiting, lightheadedness, dizziness, shortness of breath and chest pain.  Patient's left lower extremity has significantly decreased amount of Pseudomonas.  However, patient still has a significant amount of fibrous tissue throughout the left lower extremity wound beds.  Patient will need in my opinion surgical debridement with wound VAC, vera flow and probable skilled nursing placement to allow his wounds to heal.  I do not believe this patient can manage these wounds at his home nor do I believe they will heal without significant support.  Patient to complete MRI on 7/9/2021 following this we will refer patient back to LPS rounds for considerations for surgical debridement and wound VAC therapy.    REVIEW OF SYSTEMS:   Review of Systems   Constitutional: Negative for chills and fever.        States he is eating well, appetite normal   Respiratory: Negative for cough, shortness of breath and wheezing.    Cardiovascular: Positive for leg swelling. Negative for chest pain, palpitations and claudication.        Chronic swelling in legs, for several years   Gastrointestinal: Negative for nausea and vomiting.   Musculoskeletal: Positive for joint pain. Negative for myalgias.        Chronic   Skin:        Multiple diffuse open  wounds to left lower extremity a few new open wounds to right lower extremity.  Pseudomonal drainage to bilateral lower extremities.    Neurological: Negative for dizziness, weakness and headaches.   Psychiatric/Behavioral: Positive for depression.       PHYSICAL EXAMINATION:   /93 (BP Location: Left arm, Patient Position: Sitting, BP Cuff Size: Adult)   Pulse 79   Temp 36.6 °C (97.8 °F) (Temporal)   Resp 16   SpO2 97%     Physical Exam  Constitutional:       General: He is not in acute distress.     Appearance: He is not diaphoretic.   HENT:      Head: Normocephalic and atraumatic.   Eyes:      Conjunctiva/sclera: Conjunctivae normal.      Pupils: Pupils are equal, round, and reactive to light.   Pulmonary:      Effort: Pulmonary effort is normal. No respiratory distress.      Breath sounds: No wheezing.   Musculoskeletal:         General: Deformity present. Normal range of motion.      Cervical back: Neck supple.      Comments: Left charcot foot and ankle     Skin:     General: Skin is warm.      Comments: Large full-thickness wounds to lower extremities  Patient colonized with Pseudomonas to bilateral lower extremities.    Neurological:      Mental Status: He is alert and oriented to person, place, and time.   Psychiatric:         Mood and Affect: Mood and affect normal.         Judgment: Judgment normal.                                                           WOUND ASSESSMENT               Wound 06/05/20 Full Thickness Wound Leg Left --Left Lateral LE (Active)   Wound Image    07/07/21 1500   Site Assessment Pink;Red;Yellow 07/07/21 1500   Periwound Assessment Hemosiderin Staining;Scar tissue;Edema 07/07/21 1500   Margins Epibole (rolled edges);Attached edges 06/25/21 1026   Drainage Amount Large 07/07/21 1500   Drainage Description Serosanguineous 07/07/21 1500   Treatments Cleansed;Topical Lidocaine;Provider debridement;Site care 07/07/21 1500   Wound Cleansing Foam Cleanser/Washcloth 07/07/21 1500    Periwound Protectant Barrier Paste 07/07/21 1500   Dressing Cleansing/Solutions Not Applicable 07/07/21 1500   Dressing Options Hydrofiber Silver;Hydrofiber;Absorbent Abdominal Pad;Dry Roll Gauze;Hypafix Tape;Tubigrip 07/07/21 1500   Dressing Changed Changed 07/07/21 1500   Dressing Status Clean;Dry;Intact 04/28/21 1400   Dressing Change/Treatment Frequency Daily, and As Needed 06/30/21 1400   Non-staged Wound Description Full thickness 07/07/21 1500   Wound Length (cm) 16 cm 07/07/21 1500   Wound Width (cm) 5.6 cm 07/07/21 1500   Wound Depth (cm) 0.2 cm 07/07/21 1500   Wound Surface Area (cm^2) 89.6 cm^2 07/07/21 1500   Wound Volume (cm^3) 17.92 cm^3 07/07/21 1500   Post-Procedure Length (cm) 16 cm 07/07/21 1500   Post-Procedure Width (cm) 5.7 cm 07/07/21 1500   Post-Procedure Depth (cm) 0.2 cm 07/07/21 1500   Post-Procedure Surface Area (cm^2) 91.2 cm^2 07/07/21 1500   Post-Procedure Volume (cm^3) 18.24 cm^3 07/07/21 1500   Wound Healing % 67 07/07/21 1500   Wound Bed Granulation (%) 40 % 05/28/21 1100   Wound Bed Epithelium (%) 90 % 02/10/21 1300   Wound Bed Slough (%) 100 % 06/02/21 1327   Wound Bed Granulation (%) - Post-Procedure 100 % 05/19/21 1000   Wound Bed Slough % - (Post-Procedure) 90 % 06/16/21 1339   Tunneling (cm) 0 cm 07/07/21 1500   Undermining (cm) 0 cm 07/07/21 1500   Wound Odor Mild 07/07/21 1500   Exposed Structures None 07/07/21 1500       Wound 01/20/21 --Left Medial/Posterior/Lateral LE (Active)   Wound Image      07/07/21 1500   Site Assessment Red;Yellow;Plaza 07/07/21 1500   Periwound Assessment Hemosiderin Staining;Edema;Scar tissue;Pink 07/07/21 1500   Margins Epibole (rolled edges) 07/07/21 1500   Closure Secondary intention 02/24/21 1400   Drainage Amount Large 07/07/21 1500   Drainage Description Serosanguineous 07/07/21 1500   Treatments Cleansed;Topical Lidocaine;Provider debridement;Site care 07/07/21 1500   Wound Cleansing Foam Cleanser/Washcloth 07/07/21 1500   Periwound  Protectant Barrier Paste 07/07/21 1500   Dressing Cleansing/Solutions Not Applicable 07/07/21 1500   Dressing Options Hydrofiber Silver;Hydrofiber;Absorbent Abdominal Pad;Dry Roll Gauze;Hypafix Tape;Tubigrip 07/07/21 1500   Dressing Changed Changed 07/07/21 1500   Dressing Status Clean;Dry;Intact 02/24/21 1400   Dressing Change/Treatment Frequency Daily, and As Needed 06/30/21 1400   Non-staged Wound Description Full thickness 07/07/21 1500   Wound Length (cm) 12.5 cm 07/07/21 1500   Wound Width (cm) 17 cm 07/07/21 1500   Wound Depth (cm) 0.6 cm 07/07/21 1500   Wound Surface Area (cm^2) 212.5 cm^2 07/07/21 1500   Wound Volume (cm^3) 127.5 cm^3 07/07/21 1500   Post-Procedure Length (cm) 12.6 cm 07/07/21 1500   Post-Procedure Width (cm) 17.1 cm 07/07/21 1500   Post-Procedure Depth (cm) 0.6 cm 07/07/21 1500   Post-Procedure Surface Area (cm^2) 215.46 cm^2 07/07/21 1500   Post-Procedure Volume (cm^3) 129.28 cm^3 07/07/21 1500   Wound Healing % -[id number] 07/07/21 1500   Wound Bed Granulation (%) 70 % 05/28/21 1100   Wound Bed Epithelium (%) 0 % 02/10/21 1300   Wound Bed Slough (%) 75 % 06/16/21 1339   Wound Bed Eschar (%) 0 % 02/10/21 1300   Wound Bed Granulation (%) - Post-Procedure 25 % 06/02/21 1327   Tunneling (cm) 0 cm 07/07/21 1500   Undermining (cm) 0 cm 07/07/21 1500   Wound Odor Mild 07/07/21 1500   Exposed Structures None 07/07/21 1500       Wound 04/11/21 Leg Lower Right (Active)       Wound 04/11/21 Leg Lower Left (Active)       Wound 04/11/21 Heel Right DTI (Active)       Wound 04/28/21 --Right Medial LE (Active)   Wound Image   07/07/21 1500   Site Assessment Brown;Dry;Yellow 07/07/21 1500   Periwound Assessment Hemosiderin Staining;Scar tissue;Dry 07/07/21 1500   Margins Attached edges 07/07/21 1500   Drainage Amount None 07/07/21 1500   Drainage Description Serosanguineous;Yellow 06/30/21 1400   Treatments Cleansed;Site care 07/07/21 1500   Wound Cleansing Foam Cleanser/Washcloth 07/07/21 1500   Periwound  Protectant Barrier Paste 07/07/21 1500   Dressing Cleansing/Solutions Not Applicable 07/07/21 1500   Dressing Options Hydrofiber Silver;Dry Roll Gauze;Hypafix Tape;Tubigrip 07/07/21 1500   Dressing Changed Changed 07/07/21 1500   Dressing Status Clean;Dry;Intact 04/28/21 1400   Dressing Change/Treatment Frequency Daily, and As Needed 06/30/21 1400   Non-staged Wound Description Full thickness 06/30/21 1400   Wound Length (cm) 0.1 cm 07/07/21 1500   Wound Width (cm) 1.2 cm 07/07/21 1500   Wound Depth (cm) 0.1 cm 07/07/21 1500   Wound Surface Area (cm^2) 0.12 cm^2 07/07/21 1500   Wound Volume (cm^3) 0.01 cm^3 07/07/21 1500   Post-Procedure Length (cm) 1.4 cm 06/30/21 1400   Post-Procedure Width (cm) 1.3 cm 06/30/21 1400   Post-Procedure Depth (cm) 0.2 cm 06/30/21 1400   Post-Procedure Surface Area (cm^2) 1.82 cm^2 06/30/21 1400   Post-Procedure Volume (cm^3) 0.36 cm^3 06/30/21 1400   Wound Healing % 98 07/07/21 1500   Wound Bed Granulation (%) 80 % 05/19/21 1000   Wound Bed Slough (%) 75 % 06/16/21 1339   Wound Bed Granulation (%) - Post-Procedure 100 % 05/19/21 1000   Wound Bed Slough % - (Post-Procedure) 25 % 06/02/21 1327   Tunneling (cm) 0 cm 06/30/21 1400   Undermining (cm) 0 cm 06/30/21 1400   Wound Odor None 07/07/21 1500   Exposed Structures None 07/07/21 1500       Wound 05/19/21 Venous Ulcer R medial ankle (Active)   Wound Image   07/07/21 1500   Site Assessment Dry;Yellow;Brown 07/07/21 1500   Periwound Assessment Hemosiderin Staining;Scar tissue;Dry 07/07/21 1500   Margins Attached edges 07/07/21 1500   Closure Secondary intention 05/19/21 1000   Drainage Amount None 07/07/21 1500   Drainage Description Serosanguineous 06/30/21 1400   Treatments Cleansed;Site care 07/07/21 1500   Wound Cleansing Foam Cleanser/Washcloth 07/07/21 1500   Periwound Protectant Barrier Paste 07/07/21 1500   Dressing Cleansing/Solutions Not Applicable 07/07/21 1500   Dressing Options Hydrofiber Silver;Dry Roll Gauze;Hypafix  Tape;Tubigrip 07/07/21 1500   Dressing Changed Changed 07/07/21 1500   Dressing Status Clean;Dry;Intact 07/07/21 1500   Dressing Change/Treatment Frequency Daily, and As Needed 06/30/21 1400   Non-staged Wound Description Full thickness 06/30/21 1400   Wound Length (cm) 2.5 cm 07/07/21 1500   Wound Width (cm) 1 cm 07/07/21 1500   Wound Depth (cm) 0.1 cm 07/07/21 1500   Wound Surface Area (cm^2) 2.5 cm^2 07/07/21 1500   Wound Volume (cm^3) 0.25 cm^3 07/07/21 1500   Post-Procedure Length (cm) 2.5 cm 06/30/21 1400   Post-Procedure Width (cm) 1.5 cm 06/30/21 1400   Post-Procedure Depth (cm) 0.2 cm 06/30/21 1400   Post-Procedure Surface Area (cm^2) 3.75 cm^2 06/30/21 1400   Post-Procedure Volume (cm^3) 0.75 cm^3 06/30/21 1400   Wound Healing % -212 07/07/21 1500   Wound Bed Granulation (%) 80 % 05/19/21 1000   Wound Bed Slough (%) 75 % 06/16/21 1339   Wound Bed Granulation (%) - Post-Procedure 100 % 05/19/21 1000   Wound Bed Slough % - (Post-Procedure) 0 % 06/02/21 1327   Tunneling (cm) 0 cm 06/30/21 1400   Undermining (cm) 0 cm 06/30/21 1400   Wound Odor None 07/07/21 1500   Exposed Structures None 07/07/21 1500       Wound 05/28/21 LLE Medial Distal (Active)   Wound Image    06/23/21 1330   Site Assessment Red;Yellow 06/25/21 1026   Periwound Assessment Edema;Fragile;Denuded 06/25/21 1026   Margins Epibole (rolled edges) 06/25/21 1026   Drainage Amount Large 06/25/21 1026   Drainage Description Green;Tan 06/25/21 1026   Treatments Cleansed;Site care 06/25/21 1026   Wound Cleansing Foam Cleanser/Washcloth 06/25/21 1026   Periwound Protectant Skin Moisturizer;Barrier Paste 06/25/21 1026   Dressing Cleansing/Solutions Not Applicable 06/25/21 1026   Dressing Options Hydrofiber Silver;Absorbent Abdominal Pad;Dry Roll Gauze;Hypafix Tape;Tubigrip 06/25/21 1026   Dressing Changed Changed 06/23/21 1330   Dressing Status Clean;Dry;Intact 06/09/21 1300   Dressing Change/Treatment Frequency Daily, and As Needed 06/23/21 3731    Non-staged Wound Description Full thickness 06/25/21 1026   Wound Length (cm) 5.3 cm 06/23/21 1330   Wound Width (cm) 3.8 cm 06/23/21 1330   Wound Depth (cm) 0.5 cm 06/23/21 1330   Wound Surface Area (cm^2) 20.14 cm^2 06/23/21 1330   Wound Volume (cm^3) 10.07 cm^3 06/23/21 1330   Post-Procedure Length (cm) 6.5 cm 06/23/21 1330   Post-Procedure Width (cm) 5.5 cm 06/23/21 1330   Post-Procedure Depth (cm) 0.6 cm 06/23/21 1330   Post-Procedure Surface Area (cm^2) 35.75 cm^2 06/23/21 1330   Post-Procedure Volume (cm^3) 21.45 cm^3 06/23/21 1330   Wound Healing % -[id number] 06/23/21 1330   Wound Bed Slough (%) 25 % 06/16/21 1339   Wound Bed Slough % - (Post-Procedure) 0 % 06/02/21 1327   Tunneling (cm) 0 cm 06/25/21 1026   Undermining (cm) 0 cm 06/25/21 1026   Wound Odor Foul 06/25/21 1026   Exposed Structures None 06/25/21 1026       Wound 06/30/21 Right Anterior LE  (Active)   Wound Image   07/07/21 1500   Site Assessment Brown;Dry 07/07/21 1500   Periwound Assessment Hemosiderin Staining;Scar tissue 07/07/21 1500   Margins Attached edges 06/30/21 1400   Drainage Amount None 07/07/21 1500   Drainage Description Serosanguineous 06/30/21 1400   Treatments Cleansed;Site care 07/07/21 1500   Wound Cleansing Foam Cleanser/Washcloth 07/07/21 1500   Periwound Protectant Barrier Paste 07/07/21 1500   Dressing Cleansing/Solutions Not Applicable 07/07/21 1500   Dressing Options Hydrofiber Silver;Dry Roll Gauze;Hypafix Tape;Tubigrip 07/07/21 1500   Dressing Changed Changed 07/07/21 1500   Dressing Status Clean;Dry;Intact 07/07/21 1500   Dressing Change/Treatment Frequency Daily, and As Needed 06/30/21 1400   Non-staged Wound Description Full thickness 06/30/21 1400   Wound Length (cm) 3.1 cm 06/30/21 1400   Wound Width (cm) 2.8 cm 06/30/21 1400   Wound Depth (cm) 0.1 cm 06/30/21 1400   Wound Surface Area (cm^2) 8.68 cm^2 06/30/21 1400   Wound Volume (cm^3) 0.87 cm^3 06/30/21 1400   Post-Procedure Length (cm) 3.2 cm 06/30/21 1400    Post-Procedure Width (cm) 2.8 cm 21 1400   Post-Procedure Depth (cm) 0.1 cm 21 1400   Post-Procedure Surface Area (cm^2) 8.96 cm^2 21 1400   Post-Procedure Volume (cm^3) 0.9 cm^3 21 1400   Tunneling (cm) 0 cm 21 1400   Undermining (cm) 0 cm 21 1400   Wound Odor None 21 1500   Exposed Structures None 21 1500             PROCEDURE: Excisional debridement of left lower extremity wounds   -2% viscous lidocaine applied topically to wound beds for approximately 10 minutes prior to debridement  -4 mm curette used to excise thick layer of slough from all wound beds.  Excisional debridement was performed to remove devitalized tissue from all wounds until healthy, bleeding tissue was visualized.  Total area debrided approximately (100% accuracy of wound measurement difficult due to the diffuse nature of the wounds) 342.41 cm²  debrided into the subcutaneous layer of all wounds.    -Bleeding controlled with manual pressure.    -Wounds cleansed with normal saline  -Patient tolerated the procedure well, without c/o pain or discomfort.         Pertinent Labs and Diagnostics:    Labs:     A1c:   Lab Results   Component Value Date/Time    HBA1C 5.7 (H) 2020 11:22 AM      Imagin/3/2020-MRI left tibia-fibula with and without contrast  IMPRESSION:     Moderate lateral leg cellulitis and underlying myositis     No abscess or osteomyelitis    VASCULAR STUDIES: BHUPINDER 2019   Right.    Doppler waveform of the common femoral artery is of high amplitude and    triphasic.    Doppler waveforms at the ankle are brisk and triphasic.    Ankle-brachial index is normal.       Left.    Could not perform ankle-brachial index due to large blisters/ulcers.   Normal toe-brachial index: 0.94   Doppler waveform of the common femoral artery is of high amplitude and    triphasic.    Biphasic waveforms seen at the ankle.     WOUND CULTURE:    2020: Culture collected in clinic  Culture  Result Abnormal   Staphylococcus aureus   Light growth     Culture Result Abnormal   Pseudomonas aeruginosa   Light growth     Culture Result Abnormal   Enterococcus faecalis   Light growth              ASSESSMENT AND PLAN:     1. Skin ulcer of left lower leg with fat layer exposed (HCC)  Comment: Patient has history of recurring ulcerations to left lower extremity.  Has undergone ablation procedures by Dr. White.  Discharged from [facility] in March 2020 due to healing, returned approximately 3 weeks later much worse.  Hospitalized from 4/16 until 5/21/2020 underwent multiple surgical debridements.    07/07/21: Wounds are not progressing at this time.  Patient will need definitive surgical debridement following negative MRI of left lower extremity to promote wound healing.  -Excisional debridement of wounds in clinic today, necessary to help control slough.    -Patient underwent bilateral lower extremity arterial ultrasound left superficial femoral, profunda femoral, popliteal and tibial artery stenosis 50 to 75%.  No stenosis to the right leg.  There is no plan for arterial intervention at this time per Dr. Rhodes.    -Patient to return to clinic weekly for assessment and debridement  -Even with home health visits x4 weekly and family assistance patient's left lower extremity has severe pseudomonal growth.  This has significantly improved since LPS rounds and ordering of ciprofloxacin patient is covered by infectious disease APRLEON Robb  Patient reports that he is still showering daily with Zest soap and water.   Patient is to change dressing as needed for saturation.    Wound care: Hydrofiber silver, absorbent abdominal pad, dry roll gauze, Hypafix tape, Tubigrip D    2. Chronic venous stasis  Comments: Hemosiderin staining, brawny edema, scarring- findings consistent with CVI.  Pt established at [facility], underwent ablation procedures in 2019.     -Patient was seen by Dr. Rhodes at [facility] no further  plans for arterial intervention at this time.  May need to review with Dr. Rhodes if there is any necessity to reevaluate left lower extremity perfusion.    3. Acquired deformity of left ankle and foot  Comments: Charcot deformity of foot and ankle.  Very little ROM of ankle.  Patient was referred to orthopedics previously, however did not make appointment.  -Patient will need to follow-up with orthopedics once wounds are healed.    4.  Wound infection     7/7/2021:   Patient historically chronically colonized with Pseudomonas to bilateral lower extremities.   Patient is currently on ciprofloxacin and Augmentin through 7/13/2021  -Patient to shower daily with soap and water dry wound beds thoroughly and reapply dressings daily.  -Continue  home health to 4 times a week for additional dressing changes 1 time a week in clinic and his daughter to change on the weekend.    5.  Pain associated with wound    7/11/2021:  -2% viscous lidocaine applied topically to wound bed for approximately 5 minutes prior to debridement  -Patient tolerated procedure today with no complaints of discomfort.        20 min spent  with patient, time spent counseling, coordinating care, reviewing records, discussing POC, educating patient regarding wound healing and progression.  This time was spent in excess to procedure time.     Please note that this note may have been created using voice recognition software. I have worked with technical experts from [employer] to optimize the interface.  I have made every reasonable attempt to correct obvious errors, but there may be errors of grammar and possibly content that I did not discover before finalizing the note.    N

## 2021-07-07 NOTE — PATIENT INSTRUCTIONS
-Keep your wound dressing clean, dry, and intact.    -Change your dressing if it becomes soiled, soaked, or falls off.    -Should you experience any significant changes in your wound(s), such as infection (redness, swelling, localized heat, increased pain, fever > 101 F, chills) or have any questions regarding your home care instructions, please contact the wound center at [phone]. If after hours, contact your primary care physician or go to the hospital emergency room.

## 2021-07-09 ENCOUNTER — HOSPITAL ENCOUNTER (OUTPATIENT)
Dept: RADIOLOGY | Facility: MEDICAL CENTER | Age: 66
End: 2021-07-09
Attending: NURSE PRACTITIONER
Payer: MEDICARE

## 2021-07-09 DIAGNOSIS — L08.9 WOUND INFECTION: ICD-10-CM

## 2021-07-09 DIAGNOSIS — T14.8XXA WOUND INFECTION: ICD-10-CM

## 2021-07-09 DIAGNOSIS — M86.9 OSTEOMYELITIS OF LEFT TIBIA, UNSPECIFIED TYPE (HCC): ICD-10-CM

## 2021-07-09 PROCEDURE — 73718 MRI LOWER EXTREMITY W/O DYE: CPT | Mod: LT,MF

## 2021-07-14 ENCOUNTER — OFFICE VISIT (OUTPATIENT)
Dept: WOUND CARE | Facility: MEDICAL CENTER | Age: 66
End: 2021-07-14
Attending: NURSE PRACTITIONER
Payer: MEDICARE

## 2021-07-14 ENCOUNTER — OFFICE VISIT (OUTPATIENT)
Dept: INFECTIOUS DISEASES | Facility: MEDICAL CENTER | Age: 66
End: 2021-07-14
Payer: MEDICARE

## 2021-07-14 VITALS
DIASTOLIC BLOOD PRESSURE: 91 MMHG | WEIGHT: 175 LBS | SYSTOLIC BLOOD PRESSURE: 140 MMHG | RESPIRATION RATE: 16 BRPM | BODY MASS INDEX: 23.7 KG/M2 | HEIGHT: 72 IN

## 2021-07-14 VITALS
HEART RATE: 114 BPM | TEMPERATURE: 97.7 F | OXYGEN SATURATION: 98 % | DIASTOLIC BLOOD PRESSURE: 91 MMHG | SYSTOLIC BLOOD PRESSURE: 140 MMHG | RESPIRATION RATE: 19 BRPM

## 2021-07-14 DIAGNOSIS — L08.9 INFECTED SKIN ULCER WITH FAT LAYER EXPOSED (HCC): ICD-10-CM

## 2021-07-14 DIAGNOSIS — L98.492 INFECTED SKIN ULCER WITH FAT LAYER EXPOSED (HCC): ICD-10-CM

## 2021-07-14 DIAGNOSIS — R52 PAIN ASSOCIATED WITH WOUND: ICD-10-CM

## 2021-07-14 DIAGNOSIS — A49.8 PSEUDOMONAS INFECTION: ICD-10-CM

## 2021-07-14 DIAGNOSIS — L08.9 WOUND INFECTION: ICD-10-CM

## 2021-07-14 DIAGNOSIS — L97.922 SKIN ULCER OF LEFT LOWER LEG WITH FAT LAYER EXPOSED (HCC): Primary | ICD-10-CM

## 2021-07-14 DIAGNOSIS — T14.8XXA PAIN ASSOCIATED WITH WOUND: ICD-10-CM

## 2021-07-14 DIAGNOSIS — I87.8 CHRONIC VENOUS STASIS: ICD-10-CM

## 2021-07-14 DIAGNOSIS — I83.029 VENOUS STASIS ULCER OF LEFT LOWER EXTREMITY (HCC): ICD-10-CM

## 2021-07-14 DIAGNOSIS — M21.962 ACQUIRED DEFORMITY OF LEFT ANKLE AND FOOT: ICD-10-CM

## 2021-07-14 DIAGNOSIS — T14.8XXA WOUND INFECTION: ICD-10-CM

## 2021-07-14 DIAGNOSIS — L97.929 VENOUS STASIS ULCER OF LEFT LOWER EXTREMITY (HCC): ICD-10-CM

## 2021-07-14 DIAGNOSIS — B95.0 GROUP A STREPTOCOCCAL INFECTION: ICD-10-CM

## 2021-07-14 PROCEDURE — 11042 DBRDMT SUBQ TIS 1ST 20SQCM/<: CPT

## 2021-07-14 PROCEDURE — 99214 OFFICE O/P EST MOD 30 MIN: CPT | Performed by: NURSE PRACTITIONER

## 2021-07-14 PROCEDURE — 11045 DBRDMT SUBQ TISS EACH ADDL: CPT

## 2021-07-14 PROCEDURE — 11045 DBRDMT SUBQ TISS EACH ADDL: CPT | Performed by: NURSE PRACTITIONER

## 2021-07-14 PROCEDURE — 99213 OFFICE O/P EST LOW 20 MIN: CPT | Mod: 25

## 2021-07-14 PROCEDURE — 11042 DBRDMT SUBQ TIS 1ST 20SQCM/<: CPT | Performed by: NURSE PRACTITIONER

## 2021-07-14 PROCEDURE — 99213 OFFICE O/P EST LOW 20 MIN: CPT | Mod: 25 | Performed by: NURSE PRACTITIONER

## 2021-07-14 ASSESSMENT — ENCOUNTER SYMPTOMS
SHORTNESS OF BREATH: 0
COUGH: 0
VOMITING: 0
WHEEZING: 0
WEAKNESS: 0
NAUSEA: 0
FEVER: 0
CHILLS: 0
MYALGIAS: 0
PALPITATIONS: 0
CLAUDICATION: 0
HEADACHES: 0
DIZZINESS: 0
DEPRESSION: 1

## 2021-07-14 ASSESSMENT — FIBROSIS 4 INDEX: FIB4 SCORE: 1.34

## 2021-07-14 NOTE — PROGRESS NOTES
Infectious Disease Clinic    Subjective:     Chief Complaint   Patient presents with   • Follow-Up     LLE wound infection     Interval History: 66-year-old male with a PMH of chronic hepatitis C, tobacco abuse, polysubstance abuse with amphetamines, left Charcot foot and chronic left lower extremity wounds with polymicrobial sterile infections treated multiple times by infectious disease.  Of note, patient has grown MRSA, GS, MSSA and Pseudomonas several times.  Was seen by ID in the hospital mid April 2021 for chronic nonhealing left lower extremity wound positive for MRSA and Pseudomonas.  Treated with IV cefazolin x5 days.  Briefly seen during LPS rounds on 6/25, heavy amount of greenish drainage noted.  Wound culture from the LLE on 6/25 +Pseudomonas (Cipro sensitive) and group A strep.  Started on 14-day course of p.o. ciprofloxacin 750 mg twice daily and Augmentin 875/125 mg twice daily.  MRI tib/fib w/o ordered to R/O OM, persistent wounds to LLE since 2016 without complete resolution.  Everytime pt gets near wound resolution, the wounds begin to breakdown before he can finish healing.    Today, 7/14/2021:  Seen at [facility].  Has been taking and tolerating the p.o. ciprofloxacin and p.o. Augmentin without issue.  Denies feeling generally ill, fevers/chills, general malaise, headache, n/v/d, abdominal pain, chest pain, shortness of breath, cough, sore throat or rash.  Feels that the drainage from the LLE is much better and now it is a clearish yellow color without odor.  Dressings are being changed on a daily basis, once weekly by [facility] wound care, 4 times a week by home health and twice a week by his daughter.  Has chronic moderate BLE pain, left greater than right.  Improved with rest, Tylenol and meloxicam.  Patient notes that there has been conversation about surgical debridement which he is agreeable to.  Patient states once again that he has no interest in a BKA or AKA.      Past Medical  History:   Diagnosis Date   • Anxiety    • Charcot's joint of left foot, non-diabetic 3/21/2016   • Hepatitis C, chronic (HCC)    • Hypertension    • Kidney disease    • Migraine    • Polysubstance abuse (HCC) 3/8/2018   • Tobacco use 2016   • Ulcer of left lower extremity with necrosis of muscle (HCC) 3/21/2016   • Venous stasis ulcer (HCC) 2017    bilateral lower extremity       Family History   Problem Relation Age of Onset   • Lung Disease Mother 70         from COPD   • Hypertension Mother    • Other Father 82         from brain aneurysm after fall   • Cancer Neg Hx    • Heart Disease Neg Hx    • Stroke Neg Hx    • Diabetes Neg Hx        Social History     Tobacco Use   • Smoking status: Former Smoker     Packs/day: 0.00     Years: 48.00     Pack years: 0.00     Types: Cigarettes     Quit date: 2018     Years since quittin.6   • Smokeless tobacco: Never Used   • Tobacco comment: states he is using 7mg nicotine patch   Vaping Use   • Vaping Use: Never used   Substance Use Topics   • Alcohol use: No     Alcohol/week: 7.2 oz     Types: 12 Cans of beer per week     Comment: history of alcoholism    • Drug use: Yes     Types: Marijuana, Inhaled, Methamphetamines     Comment: MJ every day, intermittent meth use       Allergies: Norco [hydrocodone-acetaminophen]    Pt's medication and problem list reviewed.     Objective:     /91   Resp 16   Ht 1.829 m (6')   Wt 79.4 kg (175 lb)   BMI 23.73 kg/m²     Physical Exam  Constitutional:       General: He is not in acute distress.     Appearance: Normal appearance. He is normal weight. He is not ill-appearing or diaphoretic.   HENT:      Head: Normocephalic and atraumatic.      Right Ear: External ear normal.      Left Ear: External ear normal.   Eyes:      Extraocular Movements: Extraocular movements intact.      Conjunctiva/sclera: Conjunctivae normal.      Pupils: Pupils are equal, round, and reactive to light.   Neck:      Vascular: No  JVD.      Trachea: No tracheal deviation.   Cardiovascular:      Rate and Rhythm: Normal rate and regular rhythm.      Pulses: Normal pulses.      Heart sounds: Normal heart sounds. No murmur heard.        Comments: Unable to palpate LLE distal pulse.  Pulmonary:      Effort: Pulmonary effort is normal. No respiratory distress.      Breath sounds: Normal breath sounds. No wheezing or rales.   Abdominal:      General: Bowel sounds are normal. There is no distension.      Palpations: Abdomen is soft.      Tenderness: There is no abdominal tenderness.   Musculoskeletal:         General: No tenderness.      Cervical back: Normal range of motion and neck supple.      Left lower leg: Edema present.      Comments: Left Charcot foot and ankle    Left medial/posterior/lateral LE- 20 x 12.1 x 0.6 cm  Left lateral LE-16 x 5.8 x 0.6 cm  Wound beds are light yellow/pink, no erythematous surrounding tissue, mild amount of serous drainage on dressing without active pooling drainage, no odor, no induration or fluctuance.     Skin:     General: Skin is warm and dry.      Findings: No erythema or rash.   Neurological:      Mental Status: He is alert and oriented to person, place, and time.      Cranial Nerves: No cranial nerve deficit.      Sensory: Sensory deficit present.   Psychiatric:         Mood and Affect: Mood and affect normal.         Behavior: Behavior normal.         Thought Content: Thought content normal.         Cognition and Memory: Memory normal.         Judgment: Judgment normal.       Microbiology:  6/25/21  Culture Result Abnormal   Pseudomonas aeruginosa   Heavy growth     Culture Result Abnormal   Streptococcus pyogenes (Group A)   Moderate growth      Susceptibility   Pseudomonas aeruginosa     SHANON     Amikacin <=16 mcg/mL Sensitive     Cefepime <=2 mcg/mL Sensitive     Ciprofloxacin <=0.25 mcg/mL Sensitive     Gentamicin 4 mcg/mL Sensitive     Imipenem 2 mcg/mL Sensitive     Meropenem <=1 mcg/mL Sensitive      Pip/Tazobactam <=8 mcg/mL Sensitive     Tobramycin <=2 mcg/mL Sensitive        Imagin21  [id number]/O LEFT  IMPRESSION:  1.  Negative for osteomyelitis or abscess  2.  Cellulitis  3.  Muscular edema consistent with myositis  4.  Apparent open wound over the medial and lateral leg      Assessment and Plan:   The following treatment plan was discussed with patient at length:    1. Infected skin ulcer with fat layer exposed (HCC)      -Finish p.o. Augmentin and ciprofloxacin tonight.  No need for additional treatment as there are no active signs of skin and soft tissue infection.  -MRI negative for osteomyelitis or abscess.  No need for additional antibiotic treatment.  -Patient remains at risk for recurrent infections due to large open wounds.  Also at risk for colonization and overuse of antibiotics, will need to evaluate need for antibiotics closely.  More frequent use of antibiotics places patient at increased risk for resistance.  -Continue with wound care as directed.  -Agree that patient may benefit from surgical debridement.  I did discuss with him options of BKA/AKA; however, patient not interested in having any form of amputation done at this time.   2. Venous stasis ulcer of left lower extremity (HCC)      As above   3. Pseudomonas infection      Treated with ciprofloxacin as above   4. Group A streptococcal infection      Treated with Augmentin as above     Follow up: PRN, RTC sooner if needed. FU with PCP for ongoing chronic medical conditions.     KALLIE Díaz.    Case discussed with KEV Luna with [facility] wound clinic.  Present planning on putting patient on LPS rounds this Friday to discuss surgical debridement and SNF placement for vera flow wound VAC..       Please note that this dictation was created using voice recognition software. I have  worked with technical experts from [employer] to optimize the interface.  I have made every reasonable attempt to  correct obvious errors, but there may be errors of grammar and possibly content that I did not discover before finalizing the note.

## 2021-07-14 NOTE — PATIENT INSTRUCTIONS
-Keep your wound dressing clean, dry, and intact.    -Change your dressing if it becomes soiled, soaked, or falls off.    -Should you experience any significant changes in your wound(s), such as infection (redness, swelling, localized heat, increased pain, fever > 101 F, chills) or have any questions regarding your home care instructions, please contact the wound center at [phone]. If after hours, contact your primary care physician or go to the hospital emergency room.

## 2021-07-14 NOTE — PROGRESS NOTES
Provider Encounter- Full Thickness wound    HISTORY OF PRESENT ILLNESS  Wound History:    START OF CARE IN CLINIC: 5/20/2020               REFERRING PROVIDER: KEV Keith                 WOUND ETIOLOGY: venous,  likely mixed etiology              LOCATION: Left lower leg, multiple wounds                                   Right lower leg, multiple wounds-first observed in clinic on initial evaluation, 6/5/2020.  Resolved 9/2/2020                HISTORY: Patient is very well-known to this clinic from previous treatment of wounds to his left lower extremity.  He was discharged from the clinic in March 2020 due to full resolution of his wound.  He returned to the clinic approximately 3 weeks later, on 4/16, with cellulitis, edema, and large open wounds to his left lower leg, and was sent directly to [facility] emergency room.  He was admitted for IV antibiotics and surgical intervention.  On 4/22/2020, he underwent an I&D of his left lower extremity, fasciotomy, and VAC placement.  In the following weeks he underwent surgery 3 more times for irrigation and debridement.  He was discharged home on 5/21, on the VAC, with home health and a referral to [facility].      4/21/2021: Patient was recently discharged from [facility] following being sent from the wound care clinic.  There the patient received IV antibiotics and underwent arterial imaging.  Patient returns to [facility] for care of bilateral lower extremity wounds.      Pertinent Medical History:  Anxiety, Charcot's joint of left foot, non-diabetic (3/21/2016), Chronic congestive heart failure (HCC) (11/16/2017), Hepatitis C, chronic ([facility]), Hypertension, Migraine, Polysubstance abuse ([facility]) (3/8/2018), Tobacco use (4/18/2016), Ulcer of left lower extremity with necrosis of muscle ([facility]) (3/21/2016), and Venous stasis ulcer ([facility]) (2017).                ETIOLOGY HISTORY:  Vascular Surgeon: Dr. White. Compression Circ-aid. Varicose Veins none  visible     TOBACCO USE: Patient denies; patient also denies alcohol and recreational drug use    Patient's problem list, allergies, and current medications reviewed and updated in Epic    Interval History:  Interval History thinned 2/10/2021.  Please see previous notes for complete interval history.       4/21/2021: Clinic visit with KEV Luna. Patient states that they are feeling well today.  Patient denies fever, chills, nausea, vomiting, lightheadedness, dizziness, shortness of breath and chest pain.     4/28/2021: Clinic visit with KEV Luna. Patient states that they are feeling well today.  Patient denies fever, chills, nausea, vomiting, lightheadedness, dizziness, shortness of breath and chest pain.  Patient reports that home health came on Friday and Saturday of last week and not the 4 times as ordered.  Patient also reports that he took a shower every day as instructed.  Later in the conversation the patient stated that he only took a shower on the days that home health came which is not every day.  We also called home health who went to the patient's home all 4 times as ordered however the patient did not answer the door.  Home health left bandages for patient to dress his wound.  Patient reports no bandages were left.  Patient is noncompliant with plan of care and noncompliant with home health assistance.  Patient's wounds are filled with Pseudomonas new wounds to the right lower extremity.    5/5/2021 : Clinic visit with KEV Brantley, RYAN, DONNIEN, CFBARBER.  Kodi states he is feeling well overall, denies fevers, chills, nausea, vomiting, cough or shortness of breath.  He has been taking his Cipro as prescribed, states he has 2 more pills left, will be completing these tomorrow morning.  Home health continues to see him daily during the week for dressing changes.  His daughter is changing his dressing on the weekends.  Is also been showering daily washing his  legs.    5/12/2021: Clinic visit with KEV Luna. Patient states that they are feeling well today.  Patient denies fever, chills, nausea, vomiting, lightheadedness, dizziness, shortness of breath and chest pain.  Patient reports that he has been taking showers daily as instructed.  Patient's pseudomonal load has significantly decreased left lower extremity is improving wounds are more shallow.  Wound to right lower extremity improving.    5/19/2021: Clinic visit with KEV Luna. Patient states that they are feeling well today.  Patient denies fever, chills, nausea, vomiting, lightheadedness, dizziness, shortness of breath and chest pain. Patients wound beds have improved with minimal pseudomonal levels.  Patient reports that he is showering every day.  The quality of the tissue has also improved a great deal with the largest wound bed decreasing in dimensions.  Continue current plan of care.    5/28/2021 : Clinic visit with Dr. Ayon.  Returns for a follow-up visit has questions if there is any soap that we recommend advised him not to use antibacterial soap but good gentle soap and lots of water.  He denies chills fever headache cough chest pain abdominal pain or other acute medical issues.    6/2/2021: Clinic visit with KEV Luna. Patient states that they are feeling well today.  Patient denies fever, chills, nausea, vomiting, lightheadedness, dizziness, shortness of breath and chest pain.  Patient's pseudomonal infection is worsening.  Patient is known to be chronically colonized.  Patient's wounds are deteriorating.  Patient has new wounds developing.  Therefore I have ordered the patient a 10-day course of ciprofloxacin.  Patient is to continue to take a shower daily with soap and water.  Rolled edges taken down in clinic today to the large left lateral wound.    6/9/2021: Clinic visit with KEV Luna. Patient states that they are feeling well today.  Patient  denies fever, chills, nausea, vomiting, lightheadedness, dizziness, shortness of breath and chest pain.  Patient's wounds are becoming more fibrotic in nature.  Patient has lost granulation tissue to the superior left lower lateral wound.  Patient is finishing up his course of ciprofloxacin patient should finish course on on 6/12/2020.    6/16/2021: Clinic visit with KEV Luna. Patient states that they are feeling well today.  Patient denies fever, chills, nausea, vomiting, lightheadedness, dizziness, shortness of breath and chest pain.  Patient has done a good job at hygiene decreasing pseudomonal load.  Patient still has evidence of Pseudomonas growth to left lower extremity significantly improved however.  Patient's tissue is fibrotic in nature.  Patient has a follow-up appointment next Friday in LPS rounds.  Patient will need at minimum in my opinion surgical debridement to get below fibrous tissue and restart the granulation process.  Patient has completed his course of ciprofloxacin.  Please review arterial ultrasound with Dr. Rhodes query 50 to 75% stenosis could this be contributing to nonhealing wounds according to the arteries affected?    6/23/2021 : Clinic visit with KEV Brantley, FNP-BC, CWOCN, CFCN.  Goran states he is feeling well overall.  [facility] continues to see him daily for dressing changes in between clinic visits.  He states his daughter is also helping with dressing changes.  He is showering daily.  He is scheduled to be seen in LPS rounds on Friday.    6/30/2021: Clinic visit with KEV Luna. Patient states that they are feeling well today.  Patient denies fever, chills, nausea, vomiting, lightheadedness, dizziness, shortness of breath and chest pain.  Patient was placed on ciprofloxacin and Augmentin by Jennifer Robb infectious disease specialist, due to a positive culture on 6/25/2021 for heavy growth of Pseudomonas and moderate growth of  Streptococcus pyogenes.  Patient informed in clinic today that he needs to  both antibiotics and initiate treatment this evening.  MRI was also placed by ID.  We had the patient call imaging, in clinic, patient has MRI scheduled for July 9.    I&D to be considered if imaging is negative.  Patient will need surgical debridement.  Clinical debridement is insufficient due to the significant area of the patient's wound, fibrotic nature and thick epibole edges.  Following MRI results, will discuss with Dr. Israel regarding possible surgical debridement.    7/7/2021: Clinic visit with KEV Luna. Patient states that they are feeling well today.  Patient denies fever, chills, nausea, vomiting, lightheadedness, dizziness, shortness of breath and chest pain.  Patient's left lower extremity has significantly decreased amount of Pseudomonas.  However, patient still has a significant amount of fibrous tissue throughout the left lower extremity wound beds.  Patient will need in my opinion surgical debridement with wound VAC, vera flow and probable skilled nursing placement to allow his wounds to heal.  I do not believe this patient can manage these wounds at his home nor do I believe they will heal without significant support.  Patient to complete MRI on 7/9/2021 following this we will refer patient back to LPS rounds for considerations for surgical debridement and wound VAC therapy.    7/14/2021: Clinic visit with KEV Luna. Patient states that they are feeling well today.  Patient denies fever, chills, nausea, vomiting, lightheadedness, dizziness, shortness of breath and chest pain.  Thorough discussion was had with the patient regarding surgical debridement, vera flow wound VAC therapy and duration in skilled nursing facility.  Other options including possible amputation have been previously discussed with the patient.  He may be contemplating possible amputation versus surgical procedure  which she would not consider in the past.  I will place the patient on LPS rounds to see Dr. Israel for consideration of these procedures.  Jennifer Robb saw the patient in clinic today we have both reviewed the MRI which is negative for osteomyelitis or abscess formation.    REVIEW OF SYSTEMS:   Review of Systems   Constitutional: Negative for chills and fever.        States he is eating well, appetite normal   Respiratory: Negative for cough, shortness of breath and wheezing.    Cardiovascular: Positive for leg swelling. Negative for chest pain, palpitations and claudication.        Chronic swelling in legs, for several years   Gastrointestinal: Negative for nausea and vomiting.   Musculoskeletal: Positive for joint pain. Negative for myalgias.        Chronic   Skin:        Multiple diffuse open wounds to left lower extremity a few new open wounds to right lower extremity.  Pseudomonal drainage to bilateral lower extremities.    Neurological: Negative for dizziness, weakness and headaches.   Psychiatric/Behavioral: Positive for depression.       PHYSICAL EXAMINATION:   /91 (BP Location: Left arm, Patient Position: Sitting)   Pulse (!) 114 Comment: rn notified  Temp 36.5 °C (97.7 °F) (Temporal)   Resp 19   SpO2 98%     Physical Exam  Constitutional:       General: He is not in acute distress.     Appearance: He is not diaphoretic.   HENT:      Head: Normocephalic and atraumatic.   Eyes:      Conjunctiva/sclera: Conjunctivae normal.      Pupils: Pupils are equal, round, and reactive to light.   Pulmonary:      Effort: Pulmonary effort is normal. No respiratory distress.      Breath sounds: No wheezing.   Musculoskeletal:         General: Deformity present. Normal range of motion.      Cervical back: Neck supple.      Comments: Left charcot foot and ankle     Skin:     General: Skin is warm.      Comments: Large full-thickness wounds to lower extremities  Patient colonized with Pseudomonas to bilateral  lower extremities.    Neurological:      Mental Status: He is alert and oriented to person, place, and time.   Psychiatric:         Mood and Affect: Mood and affect normal.         Judgment: Judgment normal.                                                           WOUND ASSESSMENT              Wound 06/05/20 Full Thickness Wound Leg Left --Left Lateral LE (Active)   Wound Image    07/14/21 1100   Site Assessment Pink;Yellow 07/14/21 1100   Periwound Assessment Hemosiderin Staining;Scar tissue;Edema 07/14/21 1100   Margins Epibole (rolled edges);Attached edges 07/14/21 1100   Drainage Amount Large 07/14/21 1100   Drainage Description Serosanguineous 07/14/21 1100   Treatments Cleansed;Topical Lidocaine;Provider debridement;Site care 07/14/21 1100   Wound Cleansing Foam Cleanser/Washcloth 07/14/21 1100   Periwound Protectant Barrier Paste 07/14/21 1100   Dressing Cleansing/Solutions Not Applicable 07/14/21 1100   Dressing Options Hydrofiber Silver;Hydrofiber;Absorbent Abdominal Pad;Dry Roll Gauze;Hypafix Tape;Tubigrip 07/14/21 1100   Dressing Changed Changed 07/14/21 1100   Dressing Status Clean;Dry;Intact 04/28/21 1400   Dressing Change/Treatment Frequency Daily, and As Needed 06/30/21 1400   Non-staged Wound Description Full thickness 07/14/21 1100   Wound Length (cm) 16 cm 07/14/21 1100   Wound Width (cm) 5.7 cm 07/14/21 1100   Wound Depth (cm) 0.2 cm 07/14/21 1100   Wound Surface Area (cm^2) 91.2 cm^2 07/14/21 1100   Wound Volume (cm^3) 18.24 cm^3 07/14/21 1100   Post-Procedure Length (cm) 16 cm 07/14/21 1100   Post-Procedure Width (cm) 5.8 cm 07/14/21 1100   Post-Procedure Depth (cm) 0.2 cm 07/14/21 1100   Post-Procedure Surface Area (cm^2) 92.8 cm^2 07/14/21 1100   Post-Procedure Volume (cm^3) 18.56 cm^3 07/14/21 1100   Wound Healing % 67 07/14/21 1100   Wound Bed Granulation (%) 40 % 05/28/21 1100   Wound Bed Slough (%) 100 % 06/02/21 1327   Wound Bed Granulation (%) - Post-Procedure 100 % 05/19/21 1000    Wound Bed Slough % - (Post-Procedure) 90 % 06/16/21 1339   Tunneling (cm) 0 cm 07/14/21 1100   Undermining (cm) 0 cm 07/14/21 1100   Wound Odor None 07/14/21 1100   Exposed Structures None 07/14/21 1100       Wound 01/20/21 --Left Medial/Posterior/Lateral LE (Active)   Wound Image      07/14/21 1100   Site Assessment Yellow;Alanreed 07/14/21 1100   Periwound Assessment Hemosiderin Staining;Edema;Scar tissue;Pink 07/14/21 1100   Margins Epibole (rolled edges) 07/14/21 1100   Closure Secondary intention 02/24/21 1400   Drainage Amount Large 07/14/21 1100   Drainage Description Serosanguineous 07/14/21 1100   Treatments Cleansed;Topical Lidocaine;Provider debridement;Site care 07/14/21 1100   Wound Cleansing Foam Cleanser/Washcloth 07/14/21 1100   Periwound Protectant Barrier Paste 07/14/21 1100   Dressing Cleansing/Solutions Not Applicable 07/14/21 1100   Dressing Options Hydrofiber Silver;Hydrofiber;Absorbent Abdominal Pad;Dry Roll Gauze;Hypafix Tape;Tubigrip 07/14/21 1100   Dressing Changed Changed 07/14/21 1100   Dressing Status Clean;Dry;Intact 02/24/21 1400   Dressing Change/Treatment Frequency Daily, and As Needed 06/30/21 1400   Non-staged Wound Description Full thickness 07/14/21 1100   Wound Length (cm) 12 cm 07/14/21 1100   Wound Width (cm) 20 cm 07/14/21 1100   Wound Depth (cm) 0.6 cm 07/14/21 1100   Wound Surface Area (cm^2) 240 cm^2 07/14/21 1100   Wound Volume (cm^3) 144 cm^3 07/14/21 1100   Post-Procedure Length (cm) 12.1 cm 07/14/21 1100   Post-Procedure Width (cm) 20 cm 07/14/21 1100   Post-Procedure Depth (cm) 0.6 cm 07/14/21 1100   Post-Procedure Surface Area (cm^2) 242 cm^2 07/14/21 1100   Post-Procedure Volume (cm^3) 145.2 cm^3 07/14/21 1100   Wound Healing % -56090 07/14/21 1100   Wound Bed Granulation (%) 70 % 05/28/21 1100   Wound Bed Slough (%) 75 % 06/16/21 1339   Wound Bed Granulation (%) - Post-Procedure 25 % 06/02/21 1327   Tunneling (cm) 0 cm 07/14/21 1100   Undermining (cm) 0 cm 07/14/21  1100   Wound Odor None 07/14/21 1100   Exposed Structures None 07/14/21 1100       Wound 04/11/21 Leg Lower Right (Active)       Wound 04/11/21 Leg Lower Left (Active)       Wound 04/11/21 Heel Right DTI (Active)       Wound 04/28/21 --Right Medial LE (Active)   Wound Image    07/14/21 1100   Site Assessment Pink;Red;Yellow 07/14/21 1100   Periwound Assessment Hemosiderin Staining;Scar tissue;Dry 07/14/21 1100   Margins Attached edges 07/14/21 1100   Drainage Amount Small 07/14/21 1100   Drainage Description Serosanguineous 07/14/21 1100   Treatments Cleansed;Topical Lidocaine;Provider debridement;Site care 07/14/21 1100   Wound Cleansing Foam Cleanser/Washcloth 07/14/21 1100   Periwound Protectant Barrier Paste 07/14/21 1100   Dressing Cleansing/Solutions Not Applicable 07/14/21 1100   Dressing Options Hydrofiber Silver;Hydrofiber;Dry Roll Gauze;Hypafix Tape;Tubigrip 07/14/21 1100   Dressing Changed Changed 07/14/21 1100   Dressing Status Clean;Dry;Intact 04/28/21 1400   Dressing Change/Treatment Frequency Daily, and As Needed 06/30/21 1400   Non-staged Wound Description Full thickness 07/14/21 1100   Wound Length (cm) 1.4 cm 07/14/21 1100   Wound Width (cm) 1.2 cm 07/14/21 1100   Wound Depth (cm) 0.1 cm 07/14/21 1100   Wound Surface Area (cm^2) 1.68 cm^2 07/14/21 1100   Wound Volume (cm^3) 0.17 cm^3 07/14/21 1100   Post-Procedure Length (cm) 1.3 cm 07/14/21 1100   Post-Procedure Width (cm) 1.1 cm 07/14/21 1100   Post-Procedure Depth (cm) 0.1 cm 07/14/21 1100   Post-Procedure Surface Area (cm^2) 1.43 cm^2 07/14/21 1100   Post-Procedure Volume (cm^3) 0.14 cm^3 07/14/21 1100   Wound Healing % 70 07/14/21 1100   Wound Bed Granulation (%) 80 % 05/19/21 1000   Wound Bed Slough (%) 75 % 06/16/21 1339   Wound Bed Granulation (%) - Post-Procedure 100 % 05/19/21 1000   Wound Bed Slough % - (Post-Procedure) 25 % 06/02/21 1327   Tunneling (cm) 0 cm 07/14/21 1100   Undermining (cm) 0 cm 07/14/21 1100   Wound Odor None 07/14/21  1100   Exposed Structures None 07/14/21 1100       Wound 05/19/21 Venous Ulcer R medial ankle (Active)   Wound Image    07/14/21 1100   Site Assessment Pink;Red;Yellow 07/14/21 1100   Periwound Assessment Hemosiderin Staining;Scar tissue;Dry 07/14/21 1100   Margins Attached edges 07/14/21 1100   Closure Secondary intention 05/19/21 1000   Drainage Amount Small 07/14/21 1100   Drainage Description Serosanguineous 07/14/21 1100   Treatments Cleansed;Topical Lidocaine;Provider debridement;Site care 07/14/21 1100   Wound Cleansing Foam Cleanser/Washcloth 07/14/21 1100   Periwound Protectant Barrier Paste 07/14/21 1100   Dressing Cleansing/Solutions Not Applicable 07/14/21 1100   Dressing Options Hydrofiber Silver;Hydrofiber;Dry Roll Gauze;Hypafix Tape;Tubigrip 07/14/21 1100   Dressing Changed Changed 07/14/21 1100   Dressing Status Clean;Dry;Intact 07/14/21 1100   Dressing Change/Treatment Frequency Daily, and As Needed 06/30/21 1400   Non-staged Wound Description Full thickness 07/14/21 1100   Wound Length (cm) 2.1 cm 07/14/21 1100   Wound Width (cm) 1 cm 07/14/21 1100   Wound Depth (cm) 0.2 cm 07/14/21 1100   Wound Surface Area (cm^2) 2.1 cm^2 07/14/21 1100   Wound Volume (cm^3) 0.42 cm^3 07/14/21 1100   Post-Procedure Length (cm) 2.1 cm 07/14/21 1100   Post-Procedure Width (cm) 1 cm 07/14/21 1100   Post-Procedure Depth (cm) 0.2 cm 07/14/21 1100   Post-Procedure Surface Area (cm^2) 2.1 cm^2 07/14/21 1100   Post-Procedure Volume (cm^3) 0.42 cm^3 07/14/21 1100   Wound Healing % -425 07/14/21 1100   Wound Bed Granulation (%) 80 % 05/19/21 1000   Wound Bed Slough (%) 75 % 06/16/21 1339   Wound Bed Granulation (%) - Post-Procedure 100 % 05/19/21 1000   Wound Bed Slough % - (Post-Procedure) 0 % 06/02/21 1327   Tunneling (cm) 0 cm 07/14/21 1100   Undermining (cm) 0 cm 07/14/21 1100   Wound Odor None 07/14/21 1100   Exposed Structures None 07/14/21 1100       Wound 06/30/21 Right Anterior LE  (Active)   Wound Image   07/14/21  1100   Site Assessment Brown;Dry 07/14/21 1100   Periwound Assessment Hemosiderin Staining;Scar tissue 07/14/21 1100   Margins Attached edges 06/30/21 1400   Drainage Amount None 07/14/21 1100   Drainage Description Serosanguineous 06/30/21 1400   Treatments Cleansed;Site care 07/14/21 1100   Wound Cleansing Foam Cleanser/Washcloth 07/14/21 1100   Periwound Protectant Barrier Paste 07/14/21 1100   Dressing Cleansing/Solutions Not Applicable 07/14/21 1100   Dressing Options Hydrofiber Silver;Dry Roll Gauze;Hypafix Tape;Tubigrip 07/14/21 1100   Dressing Changed Changed 07/14/21 1100   Dressing Status Clean;Dry;Intact 07/14/21 1100   Dressing Change/Treatment Frequency Daily, and As Needed 06/30/21 1400   Non-staged Wound Description Full thickness 07/14/21 1100   Wound Length (cm) 3.1 cm 06/30/21 1400   Wound Width (cm) 2.8 cm 06/30/21 1400   Wound Depth (cm) 0.1 cm 06/30/21 1400   Wound Surface Area (cm^2) 8.68 cm^2 06/30/21 1400   Wound Volume (cm^3) 0.87 cm^3 06/30/21 1400   Post-Procedure Length (cm) 3.2 cm 06/30/21 1400   Post-Procedure Width (cm) 2.8 cm 06/30/21 1400   Post-Procedure Depth (cm) 0.1 cm 06/30/21 1400   Post-Procedure Surface Area (cm^2) 8.96 cm^2 06/30/21 1400   Post-Procedure Volume (cm^3) 0.9 cm^3 06/30/21 1400   Tunneling (cm) 0 cm 07/14/21 1100   Undermining (cm) 0 cm 07/14/21 1100   Wound Odor None 07/14/21 1100   Exposed Structures None 07/14/21 1100                         PROCEDURE: Excisional debridement of left lower extremity wounds   -2% viscous lidocaine applied topically to wound beds for approximately 10 minutes prior to debridement  -4 mm curette used to excise thick layer of slough from all wound beds.  Excisional debridement was performed to remove devitalized tissue from all wounds until healthy, bleeding tissue was visualized.  Total area debrided approximately (100% accuracy of wound measurement difficult due to the diffuse nature of the wounds) 200 cm²  debrided into the  subcutaneous layer of all wounds.    -Bleeding controlled with manual pressure.    -Wounds cleansed with normal saline  -Patient tolerated the procedure well, without c/o pain or discomfort.         Pertinent Labs and Diagnostics:    Labs:     A1c:   Lab Results   Component Value Date/Time    HBA1C 5.7 (H) 2020 11:22 AM      Imagin/3/2020-MRI left tibia-fibula with and without contrast  IMPRESSION:     Moderate lateral leg cellulitis and underlying myositis     No abscess or osteomyelitis    VASCULAR STUDIES: BHUPINDER 2019   Right.    Doppler waveform of the common femoral artery is of high amplitude and    triphasic.    Doppler waveforms at the ankle are brisk and triphasic.    Ankle-brachial index is normal.       Left.    Could not perform ankle-brachial index due to large blisters/ulcers.   Normal toe-brachial index: 0.94   Doppler waveform of the common femoral artery is of high amplitude and    triphasic.    Biphasic waveforms seen at the ankle.     WOUND CULTURE:    2020: Culture collected in clinic  Culture Result Abnormal   Staphylococcus aureus   Light growth     Culture Result Abnormal   Pseudomonas aeruginosa   Light growth     Culture Result Abnormal   Enterococcus faecalis   Light growth              ASSESSMENT AND PLAN:     1. Skin ulcer of left lower leg with fat layer exposed (HCC)  Comment: Patient has history of recurring ulcerations to left lower extremity.  Has undergone ablation procedures by Dr. White.  Discharged from [facility] in 2020 due to healing, returned approximately 3 weeks later much worse.  Hospitalized from  until 2020 underwent multiple surgical debridements.    21: Wounds are not progressing at this time.  Patient will need definitive surgical debridement following negative MRI of left lower extremity to promote wound healing.  -Excisional debridement of wounds in clinic today, necessary to help control slough.    -Patient underwent bilateral  lower extremity arterial ultrasound left superficial femoral, profunda femoral, popliteal and tibial artery stenosis 50 to 75%.  No stenosis to the right leg.  There is no plan for arterial intervention at this time per Dr. Rhodes.    -Patient to return to clinic weekly for assessment and debridement  -Patient's Pseudomonas load has significantly decreased since the patient was on ciprofloxacin 750 mg.  Patient is ending his course of antibiotics at this time but not to be continued by infectious disease.  Again patient will need definitive surgical debridement or possible amputation see interval history above.  Patient reports that he is still showering daily with Zest soap and water.   Patient is to change dressing as needed for saturation.    Wound care: Hydrofiber silver, Hydrofiber, dry roll gauze, high fixed tape, Tubigrip D    2. Chronic venous stasis  Comments: Hemosiderin staining, brawny edema, scarring- findings consistent with CVI.  Pt established at [facility], underwent ablation procedures in 2019.     -Patient was seen by Dr. Rhodes at [facility] no further plans for arterial intervention at this time.  May need to review with Dr. Rhodes if there is any necessity to reevaluate left lower extremity perfusion.    3. Acquired deformity of left ankle and foot  Comments: Charcot deformity of foot and ankle.  Very little ROM of ankle.  Patient was referred to orthopedics previously, however did not make appointment.  -Patient will need to follow-up with orthopedics once wounds are healed.    4.  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   Patient has finished his course of ciprofloxacin Augmentin.  Antibiotics will not be continued at this time.  -Patient to shower daily with soap and water dry wound beds thoroughly and reapply dressings daily.  -Continue  home health to 4 times a week for additional dressing changes 1 time a  week in clinic and his daughter to change on the weekend.    5.  Pain associated with wound    7/14/2021:  -2% viscous lidocaine applied topically to wound bed for approximately 5 minutes prior to debridement  -Patient tolerated procedure today with no complaints of discomfort.        20 min spent  with patient, time spent counseling, coordinating care, reviewing records, discussing POC, educating patient regarding wound healing and progression.  This time was spent in excess to procedure time.     Please note that this note may have been created using voice recognition software. I have worked with technical experts from [employer] to optimize the interface.  I have made every reasonable attempt to correct obvious errors, but there may be errors of grammar and possibly content that I did not discover before finalizing the note.    N

## 2021-07-16 ENCOUNTER — OFFICE VISIT (OUTPATIENT)
Dept: WOUND CARE | Facility: MEDICAL CENTER | Age: 66
End: 2021-07-16
Attending: NURSE PRACTITIONER
Payer: MEDICARE

## 2021-07-16 ENCOUNTER — HOSPITAL ENCOUNTER (OUTPATIENT)
Facility: MEDICAL CENTER | Age: 66
End: 2021-07-16
Attending: ORTHOPAEDIC SURGERY | Admitting: ORTHOPAEDIC SURGERY
Payer: MEDICARE

## 2021-07-16 DIAGNOSIS — T14.8XXA WOUND INFECTION: ICD-10-CM

## 2021-07-16 DIAGNOSIS — I87.8 CHRONIC VENOUS STASIS: ICD-10-CM

## 2021-07-16 DIAGNOSIS — L97.922 SKIN ULCER OF LEFT LOWER LEG WITH FAT LAYER EXPOSED (HCC): ICD-10-CM

## 2021-07-16 DIAGNOSIS — L97.912 SKIN ULCER OF MULTIPLE SITES OF RIGHT LOWER EXTREMITY WITH FAT LAYER EXPOSED (HCC): ICD-10-CM

## 2021-07-16 DIAGNOSIS — M21.962 ACQUIRED DEFORMITY OF LEFT ANKLE AND FOOT: ICD-10-CM

## 2021-07-16 DIAGNOSIS — L08.9 WOUND INFECTION: ICD-10-CM

## 2021-07-16 DIAGNOSIS — R52 PAIN ASSOCIATED WITH WOUND: ICD-10-CM

## 2021-07-16 DIAGNOSIS — T14.8XXA PAIN ASSOCIATED WITH WOUND: ICD-10-CM

## 2021-07-16 PROCEDURE — 99213 OFFICE O/P EST LOW 20 MIN: CPT

## 2021-07-16 PROCEDURE — 99213 OFFICE O/P EST LOW 20 MIN: CPT | Performed by: NURSE PRACTITIONER

## 2021-07-16 NOTE — DISCHARGE PLANNING
Patient scheduled to have left BKA on 7/19 and chart states may also have wound vac placed. RN CM called and left voicemail for Mihaela with [facility] regarding patient for possible wound vac. RN CM also spoke with Ericka at [facility] and she states that they need new referral for wound care depending on needs of patient after surgery. RN CM called and left voicemail for Mihaela, Dr. Israel's MA, regarding wound care referral.

## 2021-07-16 NOTE — LETTER
July 16, 2021    [facility]  [address] [place] [address]  [phone]    Patient Name: Goran Chavez  Surgeon Name: Surgeon(s):  Barrie Israel M.D.  Keisha Leo M.D.  Surgery Facility: [facility] ([address] [place] [address])  Surgery Date: 7/19/2021    The time of your surgery is not final and may change up to and until the day of your surgery. You will be contacted 24-48 hours prior to your surgery date with your check-in and surgery time.    If you will not be at one of the below numbers please call/text the surgery scheduler at   Cell Phone: [phone]    BEFORE YOUR SURGERY  Pre Registration and/or Lab Work must be done within and no earlier than 28 days prior to your surgery date. Please call [phone] for an appointment as soon as possible.    Instructions: Bring a list of all medications you are taking including the dosing and frequency.    - Your Post-Op Packet and necessary DME will be available for pick-up the day prior to surgery. Please let your provider's MA know at [phone] if you want to pick-up your prescriptions prior to surgery, hand written narcotics must be filled in [place]. If you do not  the prescription prior to surgery you will receive it at surgery.    Please refrain from smoking any substance after midnight prior to surgery. Smoking may interfere with the anesthetic and frequently produces nausea during the recovery period.    Continue taking all lifesaving medications. Including the morning of your surgery with small sip of water.    Please read the MEDICATION INSTRUCTIONS below completely.    DAY OF YOUR SURGERY  Nothing to eat or drink after midnight     Please arrive at the hospital/surgery center at the check-in time provided.     An adult will need to bring you and take you home after your surgery.     AFTER YOUR SURGERY  Post op Appointment:    - Your POST-OP Wound Care appointment will be at [facility], [facility]  [address] [place] [address] [place] [address]. Please call [phone] to make arrangements.    TIME OFF WORK  FMLA or Disability forms can be faxed directly to: [phone] or you may drop them off at [address] [place] [phone]. Our office charges a $20.00 fee per form. Forms will be completed within 10 business days of drop off and payment received. For the status of your forms you may contact our disability office directly [phone].    MEDICATION INSTRUCTIONS  The following medications should be stopped a minimum of 10 days prior to surgery:  All over the counter, Supplements & Herbal medications    Anorectics: Phentermine (Adipex-P, Lomaira and Suprenza), Phentermine-topiramate (Qsymia), Bupropion-naltrexone (Contrave)    Opiod Partial Agonists/Opioid Antagonists: Buprenorphine (Subocone, Belbuca, Butrans, Probuphine Implant, Sublocade), Naltrexone (ReVia, Vivitrol), Naloxone    Amphetamines: Dextroamphetamine/Amphetamine (Adderall, Mydayis), Methylphenidate Hydrochloride (Concerta, Metadate, Methylin, Ritalin)    The following medications should be stopped 5 days prior to surgery:  Blood Thinners: Any Aspirin, Aspirin products, anti-inflammatories such as ibuprofen and any blood thinners such as Coumadin and Plavix. Please consult your prescribing physician if you are on life saving blood thinners, in regards to when to stop medications prior to surgery.     The following medications should be stopped a minimum of 3 days prior to surgery:  PDE-5 inhibitors: Sildenafil (Viagra), Tadalafil (Cialis), Vardenafil (Levitra), Avanafil (Stendra)    MAO Inhibitors: Rasagiline (Azilect), Selegiline (Eldepryl, Emsam, Selapar), Isocarboxazid (Marplan), Phenelzine (Nardil)

## 2021-07-16 NOTE — DISCHARGE PLANNING
Received a call from Mihaela at ([facility]) re: ruthy Valdes call to her. She stated she spoke to nurse practitioner and pt is going to be admitted to hospital and potentially SNF, therefore won't require a home wound vac.

## 2021-07-17 NOTE — PROGRESS NOTES
LIMB PRESERVATION SERVICE ROUNDS    Patient seen in collaboration with interdisciplinary team during LPS rounds in wound clinic for evaluation of recurrent ulcers to left lower leg and medial ankle. Patient also has developed ulcers to right lower leg which reoccurred approximately April 2021. The left leg ulcers have been difficult to heal, he has had multiple courses of antibiotics. He was seen on LPS rounds for evaluation of possible surgical debridement and skin substitute application for LLE.    6/25/2021: Patient presents with purulent filled blister to right proximal anterior leg which he noticed today. Patient reports he is having heavy green drainage from his left leg dressings that are being changed almost daily by home health and outpatient wound care clinic.  Denies fevers, chills, nausea, vomiting.      7/14/2021: Presents today for evaluation of left lower leg ulcers. Complains of significant pain to left plantar foot, medial foot, ankle. Completed antibiotic course of high-dose Cipro and Augmentin. Seen today for evaluation of LLE I&D versus BKA. Denies any fevers, chills, nausea, vomiting.        RESULTS:      6/25/2021 left leg culture:  Pseudomonas aeruginosa   Heavy growth      Culture Result Abnormal   Streptococcus pyogenes (Group A)   Moderate growth          MRI left foot 7/9/2021:  IMPRESSION:     1.  Negative for osteomyelitis or abscess     2.  Cellulitis     3.  Muscular edema consistent with myositis     4.  Apparent open wound over the medial and lateral leg      X-ray left foot: 4/11/2021  1.  Osteoarthritis of the midfoot and forefoot     2.  Flattening of the longitudinal arch     3.  Multiple hammertoe deformities  X-ray right foot: 4/11/2021:   1.  No radiographic evidence for calcaneal osteomyelitis     2.  osteoarthritis of the midfoot, forefoot, tibiotalar joint     3.  Multiple hammertoe deformities    4/12/2021  Left superficial femoral, profunda femoral, popliteal and tibial  artery    stenoses 50-75%.   Right no stenosis.     RIGHT      Waveform            Systolic BPs (mmHg)                              140           Brachial   Triphasic                                Common Femoral   Triphasic                  170           Posterior Tibial   Bi, non-                   169           Dorsalis Pedis   reversed                                            Peroneal                              1.21          BHUPINDER                                            TBI                           LEFT   Waveform        Systolic BPs (mmHg)                              139           Brachial   Triphasic                                Common Femoral   Biphasic                   170           Posterior Tibial   Biphasic                   160           Dorsalis Pedis                                            Peroneal                              1.21          BHUPINDER                                            TBI         Findings   Right.    Doppler waveforms of the common femoral, popliteal, posterior tibial    arteries are of high amplitude and triphasic.    Doppler waveform of the dorsalis pedis artery is of high amplitude and    biphasic.    Ankle-brachial index is normal.    Toe-brachial index is normal.    Right: 1.22      Left.    Doppler waveform of the common femoral artery is of high amplitude and    triphasic.    Doppler waveforms of the popliteal, posterior tibial and dorsalis pedis    arteries are of high amplitude and biphasic.    Ankle-brachial index is normal.    Toe-brachial index is normal.    Left TBI: 1.16        Lab Results   Component Value Date/Time    HBA1C 5.7 (H) 01/23/2020 11:22 AM            OBJECTIVE FINDINGS:     Pulses: palpable pedal pulses    Wound:  Left lower leg ulcers:  Left lateral calf:  Thin layer of slough  Erythema receded  Heavy green drainage  Closed wound edges    Left ankle, circumferential  Hyper pigmented, scar tissue to periwound  Pain, tender to touch  Scattered  slough throughout wound bed  No erythema  Positive for edema  Closed wound edges  Heavy green drainage      Right lower leg ulcers    Right medial ankle:  Right tissue  No erythema  Positive for edema    Medial calf:  Minimal slough    Right proximal lateral calf scab:  Removed manually, skin intact.        PROCEDURE   Wound care completed by APRN. Cleansed wounds to BLE with NS. Patted dry. Applied barrier paste. Applied Aquacel Ag, ABD pads, roll gauze. Tubigrip E to RLE, Tubigrip F to LLE.        Surgical and non surgical options discussed by Dr. Israel. Nonsurgical options to include suppressive Cipro. side effects discussed including tendon rupture, Pseudomonas resistance to Cipro. Surgical options discussed including staged BKA procedure to include BKA, bone, tissue culture with VAC. If OR culture positive will proceed to AKA if OR culture negative will continue with secondary closure BKA. Recommend surgery. Patient agreeable to surgery. Seen by Megan prosthetist with [facility]        PLAN:   Wound Care: Continue current home health orders through [facility]. Cancel wound care appointments, plan to admit for surgery  Imaging/Labs: No plans for further imaging or labs  Offloading: Continue to wear protective footwear  Antibiotics:  ID involved. No further antibiotics at this time.  Surgery: Staged left BKA procedure 7/19/2021    Referral: [facility]      LPS Follow up: no further LPS appts at this time.     29 minutes was spent on preparation for visit, examination, counseling and coordination of care regarding the above, excluding procedure time.      Please note that this dictation was created using voice recognition software. I have  worked with technical experts from [employer] to optimize the interface.  I have made every reasonable attempt to correct obvious errors, but there may be errors of grammar and possibly content that I did not discover before finalizing the note.

## 2021-07-19 ENCOUNTER — TELEPHONE (OUTPATIENT)
Dept: WOUND CARE | Facility: MEDICAL CENTER | Age: 66
End: 2021-07-19

## 2021-07-19 ENCOUNTER — DOCUMENTATION (OUTPATIENT)
Dept: INFECTIOUS DISEASES | Facility: MEDICAL CENTER | Age: 66
End: 2021-07-19

## 2021-07-19 RX ORDER — ACETAMINOPHEN 500 MG
1000 TABLET ORAL ONCE
Status: CANCELLED | OUTPATIENT
Start: 2021-07-19 | End: 2021-07-19

## 2021-07-19 NOTE — TELEPHONE ENCOUNTER
Patient's daughter called and is concernedt about surgery being scheduled without her knowledge and states that her dad isn't 100% sure that he wants it at this time. Would like to make sure all other options have been explored prior to having BKA. Messaged via voalte, Dr. Israel to discuss with daughter. hemanth Guzman, states that her dad will not be coming to hospital for procedure today and requested to continue with wound care appointments at this time.

## 2021-07-21 ENCOUNTER — NON-PROVIDER VISIT (OUTPATIENT)
Dept: WOUND CARE | Facility: MEDICAL CENTER | Age: 66
End: 2021-07-21
Attending: NURSE PRACTITIONER
Payer: MEDICARE

## 2021-07-21 DIAGNOSIS — L97.909 CHRONIC SKIN ULCER OF LOWER LEG (HCC): ICD-10-CM

## 2021-07-21 PROCEDURE — 97602 WOUND(S) CARE NON-SELECTIVE: CPT

## 2021-07-21 NOTE — PATIENT INSTRUCTIONS
Should you experience any significant changes in your wound(s) such as infection (redness, swelling, localized heat, increased pain, fever >101 F, chills) or have any questions regarding your home care instructions, please contact the wound center [phone]. If after hours, contact your primary care physician or go the hospital emergency room.  Keep dressing clean and dry and cover while bathing. Only change dressing if over saturated, soiled or its falling off.

## 2021-07-21 NOTE — NON-PROVIDER
Pt is accompanied today by his daughter Megan. She is distraught that he is to undergo an amputation of LLE below knee. Pt has now changed his mind about having the BKA urgently also (Dr. Israel wanted to get him in this coming Monday). Pt and daughter are asking for across the board second opinions - they would like him to be referred to a different wound clinic, specifically [facility] to see Dr. Nielsen, and also to a different ID specialist and different ortho surgeon. I relayed this to Nancy ANGULO. She will make an urgent referral to [facility] wound clinic today, and says that they can make the other appropriate referrals to ortho and ID after his initial eval there at [facility]. Pt and daughter Megan are agreeable.

## 2021-07-22 ENCOUNTER — TELEPHONE (OUTPATIENT)
Dept: WOUND CARE | Facility: MEDICAL CENTER | Age: 66
End: 2021-07-22

## 2021-07-22 PROBLEM — L97.909 CHRONIC SKIN ULCER OF LOWER LEG (HCC): Status: ACTIVE | Noted: 2021-07-22

## 2021-07-22 NOTE — TELEPHONE ENCOUNTER
Referral and chart notes sent to [facility] for treatment and consult per patient and daughter Megan's  request. Spoke with Julissa and director will review. Explained that they would like a second opinion from a different infectious disease and ortho also.

## 2021-07-22 NOTE — PROGRESS NOTES
Patient accompanied by his daughter to his wound clinic appointment on 7/21.  Patient was supposed to undergo on lower extremity amputation on 7/19 after meeting with LPS team the previous Friday.  Patient canceled surgery.  Daughter is adamant that he be referred to [facility] wound clinic, a different orthopedist, and [facility].    Referral sent to [facility].  Daughter and patient informed that provider at that clinic could do any further referrals if they felt appropriate.

## 2021-07-28 ENCOUNTER — OFFICE VISIT (OUTPATIENT)
Dept: WOUND CARE | Facility: MEDICAL CENTER | Age: 66
End: 2021-07-28
Attending: NURSE PRACTITIONER
Payer: MEDICARE

## 2021-07-28 VITALS
RESPIRATION RATE: 18 BRPM | OXYGEN SATURATION: 99 % | SYSTOLIC BLOOD PRESSURE: 119 MMHG | TEMPERATURE: 97 F | HEART RATE: 107 BPM | DIASTOLIC BLOOD PRESSURE: 87 MMHG

## 2021-07-28 DIAGNOSIS — I87.8 CHRONIC VENOUS STASIS: ICD-10-CM

## 2021-07-28 DIAGNOSIS — L08.9 WOUND INFECTION: ICD-10-CM

## 2021-07-28 DIAGNOSIS — M21.962 ACQUIRED DEFORMITY OF LEFT ANKLE AND FOOT: ICD-10-CM

## 2021-07-28 DIAGNOSIS — T14.8XXA WOUND INFECTION: ICD-10-CM

## 2021-07-28 DIAGNOSIS — M79.605 PAIN IN LEFT LEG: ICD-10-CM

## 2021-07-28 DIAGNOSIS — L97.922 SKIN ULCER OF LEFT LOWER LEG WITH FAT LAYER EXPOSED (HCC): Primary | ICD-10-CM

## 2021-07-28 PROCEDURE — 11045 DBRDMT SUBQ TISS EACH ADDL: CPT | Performed by: NURSE PRACTITIONER

## 2021-07-28 PROCEDURE — 11042 DBRDMT SUBQ TIS 1ST 20SQCM/<: CPT

## 2021-07-28 PROCEDURE — 99214 OFFICE O/P EST MOD 30 MIN: CPT

## 2021-07-28 PROCEDURE — 99214 OFFICE O/P EST MOD 30 MIN: CPT | Mod: 25 | Performed by: NURSE PRACTITIONER

## 2021-07-28 PROCEDURE — 11045 DBRDMT SUBQ TISS EACH ADDL: CPT

## 2021-07-28 PROCEDURE — 11042 DBRDMT SUBQ TIS 1ST 20SQCM/<: CPT | Performed by: NURSE PRACTITIONER

## 2021-07-28 RX ORDER — CIPROFLOXACIN 750 MG/1
750 TABLET, FILM COATED ORAL 2 TIMES DAILY
Qty: 28 TABLET | Refills: 0 | Status: SHIPPED | OUTPATIENT
Start: 2021-07-28 | End: 2021-08-11

## 2021-07-28 ASSESSMENT — ENCOUNTER SYMPTOMS
NAUSEA: 0
VOMITING: 0
DEPRESSION: 1
FEVER: 0
SHORTNESS OF BREATH: 0
DIZZINESS: 0
CHILLS: 0
COUGH: 0
WEAKNESS: 0
CLAUDICATION: 0
HEADACHES: 0
WHEEZING: 0
PALPITATIONS: 0
MYALGIAS: 0

## 2021-07-28 NOTE — PROGRESS NOTES
Provider Encounter- Full Thickness wound    HISTORY OF PRESENT ILLNESS  Wound History:    START OF CARE IN CLINIC: 5/20/2020               REFERRING PROVIDER: KEV Keith                 WOUND ETIOLOGY: venous,  likely mixed etiology              LOCATION: Left lower leg, multiple wounds                                   Right lower leg, multiple wounds-first observed in clinic on initial evaluation, 6/5/2020.  Resolved 9/2/2020                HISTORY: Patient is very well-known to this clinic from previous treatment of wounds to his left lower extremity.  He was discharged from the clinic in March 2020 due to full resolution of his wound.  He returned to the clinic approximately 3 weeks later, on 4/16, with cellulitis, edema, and large open wounds to his left lower leg, and was sent directly to [facility] emergency room.  He was admitted for IV antibiotics and surgical intervention.  On 4/22/2020, he underwent an I&D of his left lower extremity, fasciotomy, and VAC placement.  In the following weeks he underwent surgery 3 more times for irrigation and debridement.  He was discharged home on 5/21, on the VAC, with home health and a referral to [facility].      4/21/2021: Patient was recently discharged from [facility] following being sent from the wound care clinic.  There the patient received IV antibiotics and underwent arterial imaging.  Patient returns to [facility] for care of bilateral lower extremity wounds.      Pertinent Medical History:  Anxiety, Charcot's joint of left foot, non-diabetic (3/21/2016), Chronic congestive heart failure (HCC) (11/16/2017), Hepatitis C, chronic ([facility]), Hypertension, Migraine, Polysubstance abuse ([facility]) (3/8/2018), Tobacco use (4/18/2016), Ulcer of left lower extremity with necrosis of muscle ([facility]) (3/21/2016), and Venous stasis ulcer ([facility]) (2017).                ETIOLOGY HISTORY:  Vascular Surgeon: Dr. White. Compression Circ-aid. Varicose Veins none  visible     TOBACCO USE: Patient denies; patient also denies alcohol and recreational drug use    Patient's problem list, allergies, and current medications reviewed and updated in Epic    Interval History:  Interval History thinned 2/10/2021.  Please see previous notes for complete interval history.       4/21/2021: Clinic visit with KEV Luna. Patient states that they are feeling well today.  Patient denies fever, chills, nausea, vomiting, lightheadedness, dizziness, shortness of breath and chest pain.     4/28/2021: Clinic visit with KEV Luna. Patient states that they are feeling well today.  Patient denies fever, chills, nausea, vomiting, lightheadedness, dizziness, shortness of breath and chest pain.  Patient reports that home health came on Friday and Saturday of last week and not the 4 times as ordered.  Patient also reports that he took a shower every day as instructed.  Later in the conversation the patient stated that he only took a shower on the days that home health came which is not every day.  We also called home health who went to the patient's home all 4 times as ordered however the patient did not answer the door.  Home health left bandages for patient to dress his wound.  Patient reports no bandages were left.  Patient is noncompliant with plan of care and noncompliant with home health assistance.  Patient's wounds are filled with Pseudomonas new wounds to the right lower extremity.    5/5/2021 : Clinic visit with KEV Brantley, RYAN, DONNIEN, CFBARBER.  Kodi states he is feeling well overall, denies fevers, chills, nausea, vomiting, cough or shortness of breath.  He has been taking his Cipro as prescribed, states he has 2 more pills left, will be completing these tomorrow morning.  Home health continues to see him daily during the week for dressing changes.  His daughter is changing his dressing on the weekends.  Is also been showering daily washing his  legs.    5/12/2021: Clinic visit with KEV Luna. Patient states that they are feeling well today.  Patient denies fever, chills, nausea, vomiting, lightheadedness, dizziness, shortness of breath and chest pain.  Patient reports that he has been taking showers daily as instructed.  Patient's pseudomonal load has significantly decreased left lower extremity is improving wounds are more shallow.  Wound to right lower extremity improving.    5/19/2021: Clinic visit with KEV Luna. Patient states that they are feeling well today.  Patient denies fever, chills, nausea, vomiting, lightheadedness, dizziness, shortness of breath and chest pain. Patients wound beds have improved with minimal pseudomonal levels.  Patient reports that he is showering every day.  The quality of the tissue has also improved a great deal with the largest wound bed decreasing in dimensions.  Continue current plan of care.    5/28/2021 : Clinic visit with Dr. Ayon.  Returns for a follow-up visit has questions if there is any soap that we recommend advised him not to use antibacterial soap but good gentle soap and lots of water.  He denies chills fever headache cough chest pain abdominal pain or other acute medical issues.    6/2/2021: Clinic visit with KEV Luna. Patient states that they are feeling well today.  Patient denies fever, chills, nausea, vomiting, lightheadedness, dizziness, shortness of breath and chest pain.  Patient's pseudomonal infection is worsening.  Patient is known to be chronically colonized.  Patient's wounds are deteriorating.  Patient has new wounds developing.  Therefore I have ordered the patient a 10-day course of ciprofloxacin.  Patient is to continue to take a shower daily with soap and water.  Rolled edges taken down in clinic today to the large left lateral wound.    6/9/2021: Clinic visit with KEV Luna. Patient states that they are feeling well today.  Patient  denies fever, chills, nausea, vomiting, lightheadedness, dizziness, shortness of breath and chest pain.  Patient's wounds are becoming more fibrotic in nature.  Patient has lost granulation tissue to the superior left lower lateral wound.  Patient is finishing up his course of ciprofloxacin patient should finish course on on 6/12/2020.    6/16/2021: Clinic visit with KEV Luna. Patient states that they are feeling well today.  Patient denies fever, chills, nausea, vomiting, lightheadedness, dizziness, shortness of breath and chest pain.  Patient has done a good job at hygiene decreasing pseudomonal load.  Patient still has evidence of Pseudomonas growth to left lower extremity significantly improved however.  Patient's tissue is fibrotic in nature.  Patient has a follow-up appointment next Friday in LPS rounds.  Patient will need at minimum in my opinion surgical debridement to get below fibrous tissue and restart the granulation process.  Patient has completed his course of ciprofloxacin.  Please review arterial ultrasound with Dr. Rhodes query 50 to 75% stenosis could this be contributing to nonhealing wounds according to the arteries affected?    6/23/2021 : Clinic visit with KEV Brantley, FNP-BC, CWOCN, CFCN.  Goran states he is feeling well overall.  [facility] continues to see him daily for dressing changes in between clinic visits.  He states his daughter is also helping with dressing changes.  He is showering daily.  He is scheduled to be seen in LPS rounds on Friday.    6/30/2021: Clinic visit with KEV Luna. Patient states that they are feeling well today.  Patient denies fever, chills, nausea, vomiting, lightheadedness, dizziness, shortness of breath and chest pain.  Patient was placed on ciprofloxacin and Augmentin by Jennifer Robb infectious disease specialist, due to a positive culture on 6/25/2021 for heavy growth of Pseudomonas and moderate growth of  Streptococcus pyogenes.  Patient informed in clinic today that he needs to  both antibiotics and initiate treatment this evening.  MRI was also placed by ID.  We had the patient call imaging, in clinic, patient has MRI scheduled for July 9.    I&D to be considered if imaging is negative.  Patient will need surgical debridement.  Clinical debridement is insufficient due to the significant area of the patient's wound, fibrotic nature and thick epibole edges.  Following MRI results, will discuss with Dr. Israel regarding possible surgical debridement.    7/7/2021: Clinic visit with KEV Luna. Patient states that they are feeling well today.  Patient denies fever, chills, nausea, vomiting, lightheadedness, dizziness, shortness of breath and chest pain.  Patient's left lower extremity has significantly decreased amount of Pseudomonas.  However, patient still has a significant amount of fibrous tissue throughout the left lower extremity wound beds.  Patient will need in my opinion surgical debridement with wound VAC, vera flow and probable skilled nursing placement to allow his wounds to heal.  I do not believe this patient can manage these wounds at his home nor do I believe they will heal without significant support.  Patient to complete MRI on 7/9/2021 following this we will refer patient back to LPS rounds for considerations for surgical debridement and wound VAC therapy.    7/14/2021: Clinic visit with KEV Luna. Patient states that they are feeling well today.  Patient denies fever, chills, nausea, vomiting, lightheadedness, dizziness, shortness of breath and chest pain.  Thorough discussion was had with the patient regarding surgical debridement, vera flow wound VAC therapy and duration in skilled nursing facility.  Other options including possible amputation have been previously discussed with the patient.  He may be contemplating possible amputation versus surgical procedure  which she would not consider in the past.  I will place the patient on LPS rounds to see Dr. Israel for consideration of these procedures.  Jennifer Robb saw the patient in clinic today we have both reviewed the MRI which is negative for osteomyelitis or abscess formation.    7/28/2021: Clinic visit with KEV Luna. Patient states that they are feeling well today.  Patient denies fever, chills, nausea, vomiting, lightheadedness, dizziness, shortness of breath and chest pain.  Patient presents again in clinic with a significant amount of pseudomonal drainage.  Patient's wound has significantly degraded.  I recommended the patient go to [facility] ER for IV antibiotics and second opinion.  Patient was seen in LPS rounds where they recommended amputation BKA of left lower extremity.  Patient was first agreeable to this plan.  However after further consultation with his daughter they want to pursue a second opinion.  Patient refused today to go to [facility] ER which I recommended.  Therefore I will obtain an EKG and start the patient on ciprofloxacin 750 mg twice daily.  Patient is to go directly to the ER for increased signs and symptoms of infection including but not limited to increasing erythema, edema, fever, chills, malaise/fatigue, increased pain or any other symptoms out of the ordinary.  Wounds to right lower extremity now are infected with Pseudomonas.    REVIEW OF SYSTEMS:   Review of Systems   Constitutional: Negative for chills and fever.        States he is eating well, appetite normal   Respiratory: Negative for cough, shortness of breath and wheezing.    Cardiovascular: Positive for leg swelling. Negative for chest pain, palpitations and claudication.        Chronic swelling in legs, for several years   Gastrointestinal: Negative for nausea and vomiting.   Musculoskeletal: Positive for joint pain. Negative for myalgias.        Chronic   Skin:        Multiple diffuse open wounds  to left lower extremity a few new open wounds to right lower extremity.  Pseudomonal drainage to bilateral lower extremities.    Neurological: Negative for dizziness, weakness and headaches.   Psychiatric/Behavioral: Positive for depression.       PHYSICAL EXAMINATION:   /87 (BP Location: Right arm, Patient Position: Sitting)   Pulse (!) 107 Comment: rn notified  Temp 36.1 °C (97 °F) (Temporal)   Resp 18   SpO2 99%     Physical Exam  Constitutional:       General: He is not in acute distress.     Appearance: He is not diaphoretic.   HENT:      Head: Normocephalic and atraumatic.   Eyes:      Conjunctiva/sclera: Conjunctivae normal.      Pupils: Pupils are equal, round, and reactive to light.   Pulmonary:      Effort: Pulmonary effort is normal. No respiratory distress.      Breath sounds: No wheezing.   Musculoskeletal:         General: Deformity present. Normal range of motion.      Cervical back: Neck supple.      Comments: Left charcot foot and ankle     Skin:     General: Skin is warm.      Comments: Large full-thickness wounds to lower extremities  Patient colonized with Pseudomonas to bilateral lower extremities.    Neurological:      Mental Status: He is alert and oriented to person, place, and time.   Psychiatric:         Mood and Affect: Mood and affect normal.         Judgment: Judgment normal.                                                           WOUND ASSESSMENT           Wound 06/05/20 Full Thickness Wound Leg Left --Left Lateral LE (Active)   Wound Image    07/28/21 1017   Site Assessment Pink;Yellow 07/28/21 1017   Periwound Assessment Hemosiderin Staining;Scar tissue;Edema 07/28/21 1017   Margins Epibole (rolled edges);Attached edges 07/28/21 1017   Drainage Amount Copious 07/28/21 1017   Drainage Description Serosanguineous 07/28/21 1017   Treatments Cleansed;Topical Lidocaine;Provider debridement 07/28/21 1017   Wound Cleansing Foam Cleanser/Washcloth 07/28/21 1017   Periwound  Protectant Skin Moisturizer;Barrier Paste 07/28/21 1017   Dressing Cleansing/Solutions Not Applicable 07/28/21 1017   Dressing Options Hydrofiber Silver;Absorbent Abdominal Pad;Dry Roll Gauze;Hypafix Tape;Tubigrip 07/28/21 1017   Dressing Changed Changed 07/21/21 0900   Dressing Status Clean;Dry;Intact 04/28/21 1400   Dressing Change/Treatment Frequency Daily, and As Needed 07/21/21 0900   Non-staged Wound Description Full thickness 07/28/21 1017   Wound Length (cm) 16 cm 07/28/21 1017   Wound Width (cm) 5.9 cm 07/28/21 1017   Wound Depth (cm) 0.2 cm 07/28/21 1017   Wound Surface Area (cm^2) 94.4 cm^2 07/28/21 1017   Wound Volume (cm^3) 18.88 cm^3 07/28/21 1017   Post-Procedure Length (cm) 16.4 cm 07/28/21 1017   Post-Procedure Width (cm) 5.9 cm 07/28/21 1017   Post-Procedure Depth (cm) 0.2 cm 07/28/21 1017   Post-Procedure Surface Area (cm^2) 96.76 cm^2 07/28/21 1017   Post-Procedure Volume (cm^3) 19.35 cm^3 07/28/21 1017   Wound Healing % 65 07/28/21 1017   Wound Bed Granulation (%) 40 % 05/28/21 1100   Wound Bed Slough (%) 100 % 06/02/21 1327   Wound Bed Granulation (%) - Post-Procedure 100 % 05/19/21 1000   Wound Bed Slough % - (Post-Procedure) 90 % 06/16/21 1339   Tunneling (cm) 0 cm 07/28/21 1017   Undermining (cm) 0 cm 07/28/21 1017   Wound Odor None 07/28/21 1017   Exposed Structures None 07/28/21 1017       Wound 01/20/21 --Left Medial/Posterior/Lateral LE (Active)   Wound Image    07/28/21 1017   Site Assessment Purple;Yellow;Red;Grey 07/28/21 1017   Periwound Assessment Hemosiderin Staining;Edema;Scar tissue;Pink 07/28/21 1017   Margins Epibole (rolled edges) 07/28/21 1017   Drainage Amount Copious 07/28/21 1017   Drainage Description Serosanguineous 07/28/21 1017   Treatments Cleansed;Topical Lidocaine;Provider debridement 07/28/21 1017   Wound Cleansing Foam Cleanser/Washcloth 07/28/21 1017   Periwound Protectant Skin Moisturizer;Barrier Paste 07/28/21 1017   Dressing Cleansing/Solutions Not Applicable  07/28/21 1017   Dressing Options Hydrofiber Silver;Absorbent Abdominal Pad;Dry Roll Gauze;Hypafix Tape;Tubigrip 07/28/21 1017   Dressing Changed Changed 07/21/21 0900   Dressing Change/Treatment Frequency Daily, and As Needed 07/21/21 0900   Non-staged Wound Description Full thickness 07/28/21 1017   Wound Length (cm) 20 cm 07/28/21 1017   Wound Width (cm) 14 cm 07/28/21 1017   Wound Depth (cm) 1 cm 07/28/21 1017   Wound Surface Area (cm^2) 280 cm^2 07/28/21 1017   Wound Volume (cm^3) 280 cm^3 07/28/21 1017   Post-Procedure Length (cm) 20.1 cm 07/28/21 1017   Post-Procedure Width (cm) 14 cm 07/28/21 1017   Post-Procedure Depth (cm) 1 cm 07/28/21 1017   Post-Procedure Surface Area (cm^2) 281.4 cm^2 07/28/21 1017   Post-Procedure Volume (cm^3) 281.4 cm^3 07/28/21 1017   Wound Healing % -[id number] 07/28/21 1017   Wound Bed Granulation (%) 70 % 05/28/21 1100   Wound Bed Slough (%) 85 % 07/28/21 1017   Wound Bed Granulation (%) - Post-Procedure 25 % 06/02/21 1327   Tunneling (cm) 0 cm 07/28/21 1017   Undermining (cm) 0 cm 07/28/21 1017   Wound Odor None 07/28/21 1017   Exposed Structures None 07/28/21 1017       Wound 04/11/21 Leg Lower Right (Active)       Wound 04/11/21 Leg Lower Left (Active)       Wound 04/11/21 Heel Right DTI (Active)       Wound 04/28/21 --Right Medial LE (Active)   Wound Image    07/28/21 1017   Site Assessment Yellow 07/28/21 1017   Periwound Assessment Hemosiderin Staining;Scar tissue;Dry 07/28/21 1017   Margins Unattached edges 07/28/21 1017   Drainage Amount Moderate 07/28/21 1017   Drainage Description Serosanguineous 07/28/21 1017   Treatments Cleansed;Topical Lidocaine;Provider debridement 07/28/21 1017   Wound Cleansing Foam Cleanser/Washcloth 07/28/21 1017   Periwound Protectant Skin Moisturizer;Barrier Paste 07/28/21 1017   Dressing Cleansing/Solutions Not Applicable 07/28/21 1017   Dressing Options Hydrofiber Silver;Absorbent Abdominal Pad;Dry Roll Gauze;Hypafix Tape;Tubigrip 07/28/21 1017    Dressing Changed Changed 07/21/21 0900   Dressing Status Clean;Dry;Intact 04/28/21 1400   Dressing Change/Treatment Frequency Daily, and As Needed 06/30/21 1400   Non-staged Wound Description Full thickness 07/28/21 1017   Wound Length (cm) 1.5 cm 07/28/21 1017   Wound Width (cm) 1.5 cm 07/28/21 1017   Wound Depth (cm) 0.3 cm 07/28/21 1017   Wound Surface Area (cm^2) 2.25 cm^2 07/28/21 1017   Wound Volume (cm^3) 0.68 cm^3 07/28/21 1017   Post-Procedure Length (cm) 1.5 cm 07/28/21 1017   Post-Procedure Width (cm) 1.6 cm 07/28/21 1017   Post-Procedure Depth (cm) 0.3 cm 07/28/21 1017   Post-Procedure Surface Area (cm^2) 2.4 cm^2 07/28/21 1017   Post-Procedure Volume (cm^3) 0.72 cm^3 07/28/21 1017   Wound Healing % -19 07/28/21 1017   Wound Bed Granulation (%) 80 % 05/19/21 1000   Wound Bed Slough (%) 100 % 07/28/21 1017   Wound Bed Granulation (%) - Post-Procedure 100 % 05/19/21 1000   Wound Bed Slough % - (Post-Procedure) 25 % 06/02/21 1327   Tunneling (cm) 0 cm 07/28/21 1017   Undermining (cm) 0 cm 07/28/21 1017   Wound Odor None 07/28/21 1017   Exposed Structures None 07/28/21 1017       Wound 05/19/21 Venous Ulcer R medial ankle (Active)   Wound Image    07/28/21 1017   Site Assessment Yellow 07/28/21 1017   Periwound Assessment Hemosiderin Staining;Scar tissue;Dry 07/28/21 1017   Margins Unattached edges 07/28/21 1017   Closure Secondary intention 05/19/21 1000   Drainage Amount Moderate 07/28/21 1017   Drainage Description Serosanguineous 07/28/21 1017   Treatments Cleansed;Topical Lidocaine;Provider debridement 07/28/21 1017   Wound Cleansing Foam Cleanser/Washcloth 07/28/21 1017   Periwound Protectant Skin Moisturizer;Barrier Paste 07/28/21 1017   Dressing Cleansing/Solutions Not Applicable 07/28/21 1017   Dressing Options Hydrofiber Silver;Absorbent Abdominal Pad;Dry Roll Gauze;Hypafix Tape;Tubigrip 07/28/21 1017   Dressing Changed Changed 07/21/21 0900   Dressing Status Clean;Dry;Intact 07/21/21 0900    Dressing Change/Treatment Frequency Daily, and As Needed 21 1400   Non-staged Wound Description Full thickness 21 1017   Wound Length (cm) 3.1 cm 21 1017   Wound Width (cm) 1.9 cm 21 1017   Wound Depth (cm) 0.3 cm 21 1017   Wound Surface Area (cm^2) 5.89 cm^2 21 1017   Wound Volume (cm^3) 1.77 cm^3 21 1017   Post-Procedure Length (cm) 3.5 cm 21 1017   Post-Procedure Width (cm) 1.9 cm 21 1017   Post-Procedure Depth (cm) 0.3 cm 21 1017   Post-Procedure Surface Area (cm^2) 6.65 cm^2 21 1017   Post-Procedure Volume (cm^3) 2 cm^3 21 1017   Wound Healing % -21 1017   Wound Bed Granulation (%) 80 % 21 1000   Wound Bed Slough (%) 100 % 21 1017   Wound Bed Granulation (%) - Post-Procedure 100 % 21 1000   Wound Bed Slough % - (Post-Procedure) 0 % 21 1327   Tunneling (cm) 0 cm 21 1017   Undermining (cm) 0 cm 21 1017   Wound Odor None 21 1017   Exposed Structures None 21 1017       PROCEDURE: Excisional debridement of left lower extremity wounds   -2% viscous lidocaine applied topically to wound beds for approximately 10 minutes prior to debridement  -4 mm curette used to excise thick layer of slough from all wound beds.  Excisional debridement was performed to remove devitalized tissue from all wounds until healthy, bleeding tissue was visualized.  Total area debrided approximately (100% accuracy of wound measurement difficult due to the diffuse nature of the wounds) 80 cm²  debrided into the subcutaneous layer of all wounds.    -Bleeding controlled with manual pressure.    -Wounds cleansed with normal saline  -Patient tolerated the procedure well, without c/o pain or discomfort.         Pertinent Labs and Diagnostics:    Labs:     A1c:   Lab Results   Component Value Date/Time    HBA1C 5.7 (H) 2020 11:22 AM      Imagin/3/2020-MRI left tibia-fibula with and without  contrast  IMPRESSION:     Moderate lateral leg cellulitis and underlying myositis     No abscess or osteomyelitis    VASCULAR STUDIES: BHUPINDER 12/27/2019   Right.    Doppler waveform of the common femoral artery is of high amplitude and    triphasic.    Doppler waveforms at the ankle are brisk and triphasic.    Ankle-brachial index is normal.       Left.    Could not perform ankle-brachial index due to large blisters/ulcers.   Normal toe-brachial index: 0.94   Doppler waveform of the common femoral artery is of high amplitude and    triphasic.    Biphasic waveforms seen at the ankle.     WOUND CULTURE:    11/18/2020: Culture collected in clinic  Culture Result Abnormal   Staphylococcus aureus   Light growth     Culture Result Abnormal   Pseudomonas aeruginosa   Light growth     Culture Result Abnormal   Enterococcus faecalis   Light growth              ASSESSMENT AND PLAN:     1. Skin ulcer of left lower leg with fat layer exposed (HCC)  Comment: Patient has history of recurring ulcerations to left lower extremity.  Has undergone ablation procedures by Dr. White.  Discharged from [facility] in March 2020 due to healing, returned approximately 3 weeks later much worse.  Hospitalized from 4/16 until 5/21/2020 underwent multiple surgical debridements.    07/14/21: Wound area to left lower extremity has deteriorated.  Colonized Pseudomonas covering the majority of the left lower extremity.  -Excisional debridement of wounds in clinic today, necessary to help control slough.    -Patient underwent bilateral lower extremity arterial ultrasound left superficial femoral, profunda femoral, popliteal and tibial artery stenosis 50 to 75%.  No stenosis to the right leg.  There is no plan for arterial intervention at this time per Dr. Rhodes.    -Patient instructed to go to [facility] ER for possible IV antibiotics and a second opinion.  Patient refused.  Again I have ordered the patient 750 mg of ciprofloxacin twice daily as well  as an EKG prior to initiation of Cipro to monitor QTc interval.   Patient is to change dressing as needed for saturation.    Wound care: Hydrofiber silver, Hydrofiber, dry roll gauze, high fixed tape, Tubigrip D    2. Chronic venous stasis  Comments: Hemosiderin staining, brawny edema, scarring- findings consistent with CVI.  Pt established at [facility], underwent ablation procedures in 2019.     -Patient was seen by Dr. Rhodes at [facility] no further plans for arterial intervention at this time.  May need to review with Dr. Rhodes if there is any necessity to reevaluate left lower extremity perfusion.    3. Acquired deformity of left ankle and foot  Comments: Charcot deformity of foot and ankle.  Very little ROM of ankle.  Patient was referred to orthopedics previously, however did not make appointment.    4.  Wound infection     7/28/2021:   Patient historically chronically colonized with Pseudomonas to bilateral lower extremities.   -Patient given a 14-day course of Cipro to initiate following EKG and determination of normal limits on QTc interval  -Patient to shower daily with soap and water dry wound beds thoroughly and reapply dressings daily.  -Continue  home health to 4 times a week for additional dressing changes 1 time a week in clinic and his daughter to change on the weekend.  -Patient was warned that there is a possibility that his chronic Pseudomonas could cause or lead to sepsis due to heavy bacterial load and debridement to remove slough creating a portal for pseudomonal entry.  Again I reiterated to the patient that I believe in his best interest he should go to [facility] ER for IV antibiotics and a second opinion.  Again patient did not want to go to the ER at this time.    5.  Pain associated with wound    7/28/2021:  -2% viscous lidocaine applied topically to wound bed for approximately 5 minutes prior to debridement  -Patient tolerated procedure today with no complaints of  discomfort.        30 min spent  with patient, time spent counseling, coordinating care, reviewing records, discussing POC, educating patient regarding wound healing and progression.  This time was spent in excess to procedure time.     Please note that this note may have been created using voice recognition software. I have worked with technical experts from [facility] to optimize the interface.  I have made every reasonable attempt to correct obvious errors, but there may be errors of grammar and possibly content that I did not discover before finalizing the note.    N

## 2021-07-28 NOTE — PROGRESS NOTES
Home health orders faxed to [facility] #[phone]

## 2021-07-28 NOTE — PATIENT INSTRUCTIONS
-Keep dressings clean and dry. Change dressings if they become over saturated, soiled or fall off.     -Avoid prolonged standing or sitting without elevating your legs.    -Remove your compression garments if you have severe pain, severe swelling, numbness, color change, or temperature change in your toes. If you need to remove your compression garments, do so by unrolling them. Do not cut the compression garments off, this is to prevent cutting yourself on accident.    -Should you experience any significant changes in your wound(s), such as signs of infection (redness, swelling, localized heat, increased pain, fever > 101 F, chills) or have any questions regarding your home care instructions, please contact the wound center at [phone]. If after hours, contact your primary care physician or go to the hospital emergency room.     -If you are 5 or more minutes late for an appointment, we reserve the right to cancel and reschedule that appointment. Additionally, if you are habitually late or not showing (3 late cancellations and/or no shows), we reserve the right to cancel your remaining appointments and it will be your responsibility to obtain a new referral if services are still needed.

## 2021-07-29 ENCOUNTER — HOSPITAL ENCOUNTER (OUTPATIENT)
Dept: LAB | Facility: MEDICAL CENTER | Age: 66
End: 2021-07-29
Attending: INTERNAL MEDICINE
Payer: MEDICARE

## 2021-07-29 ENCOUNTER — HOSPITAL ENCOUNTER (OUTPATIENT)
Dept: CARDIOLOGY | Facility: MEDICAL CENTER | Age: 66
End: 2021-07-29
Attending: NURSE PRACTITIONER
Payer: MEDICARE

## 2021-07-29 ENCOUNTER — OFFICE VISIT (OUTPATIENT)
Dept: MEDICAL GROUP | Facility: MEDICAL CENTER | Age: 66
End: 2021-07-29
Attending: INTERNAL MEDICINE
Payer: MEDICARE

## 2021-07-29 VITALS
BODY MASS INDEX: 22.89 KG/M2 | WEIGHT: 169 LBS | SYSTOLIC BLOOD PRESSURE: 118 MMHG | DIASTOLIC BLOOD PRESSURE: 78 MMHG | TEMPERATURE: 97.9 F | HEART RATE: 110 BPM | OXYGEN SATURATION: 96 % | RESPIRATION RATE: 20 BRPM | HEIGHT: 72 IN

## 2021-07-29 DIAGNOSIS — R73.03 PREDIABETES: ICD-10-CM

## 2021-07-29 DIAGNOSIS — D64.9 NORMOCYTIC ANEMIA: ICD-10-CM

## 2021-07-29 DIAGNOSIS — L97.909 CHRONIC SKIN ULCER OF LOWER LEG (HCC): ICD-10-CM

## 2021-07-29 DIAGNOSIS — E78.49 OTHER HYPERLIPIDEMIA: ICD-10-CM

## 2021-07-29 DIAGNOSIS — N18.31 STAGE 3A CHRONIC KIDNEY DISEASE: ICD-10-CM

## 2021-07-29 PROBLEM — I83.029 VENOUS STASIS ULCER OF LEFT LOWER EXTREMITY (HCC): Status: RESOLVED | Noted: 2017-08-11 | Resolved: 2021-07-29

## 2021-07-29 PROBLEM — D53.9 MACROCYTIC ANEMIA: Status: RESOLVED | Noted: 2018-08-15 | Resolved: 2021-07-29

## 2021-07-29 PROBLEM — L97.929 VENOUS STASIS ULCER OF LEFT LOWER EXTREMITY (HCC): Status: RESOLVED | Noted: 2017-08-11 | Resolved: 2021-07-29

## 2021-07-29 LAB
ALBUMIN SERPL BCP-MCNC: 3.4 G/DL (ref 3.2–4.9)
ALBUMIN/GLOB SERPL: 0.7 G/DL
ALP SERPL-CCNC: 105 U/L (ref 30–99)
ALT SERPL-CCNC: 23 U/L (ref 2–50)
ANION GAP SERPL CALC-SCNC: 13 MMOL/L (ref 7–16)
AST SERPL-CCNC: 38 U/L (ref 12–45)
BASOPHILS # BLD AUTO: 0.6 % (ref 0–1.8)
BASOPHILS # BLD: 0.06 K/UL (ref 0–0.12)
BILIRUB SERPL-MCNC: 0.5 MG/DL (ref 0.1–1.5)
BUN SERPL-MCNC: 20 MG/DL (ref 8–22)
CALCIUM SERPL-MCNC: 8.9 MG/DL (ref 8.5–10.5)
CHLORIDE SERPL-SCNC: 106 MMOL/L (ref 96–112)
CHOLEST SERPL-MCNC: 109 MG/DL (ref 100–199)
CO2 SERPL-SCNC: 20 MMOL/L (ref 20–33)
CREAT SERPL-MCNC: 1.14 MG/DL (ref 0.5–1.4)
EKG IMPRESSION: NORMAL
EOSINOPHIL # BLD AUTO: 0.25 K/UL (ref 0–0.51)
EOSINOPHIL NFR BLD: 2.7 % (ref 0–6.9)
ERYTHROCYTE [DISTWIDTH] IN BLOOD BY AUTOMATED COUNT: 53.5 FL (ref 35.9–50)
EST. AVERAGE GLUCOSE BLD GHB EST-MCNC: 111 MG/DL
GLOBULIN SER CALC-MCNC: 5 G/DL (ref 1.9–3.5)
GLUCOSE SERPL-MCNC: 106 MG/DL (ref 65–99)
HBA1C MFR BLD: 5.5 % (ref 4–5.6)
HCT VFR BLD AUTO: 39.3 % (ref 42–52)
HDLC SERPL-MCNC: 30 MG/DL
HGB BLD-MCNC: 12.3 G/DL (ref 14–18)
IMM GRANULOCYTES # BLD AUTO: 0.03 K/UL (ref 0–0.11)
IMM GRANULOCYTES NFR BLD AUTO: 0.3 % (ref 0–0.9)
LDLC SERPL CALC-MCNC: 64 MG/DL
LYMPHOCYTES # BLD AUTO: 1.09 K/UL (ref 1–4.8)
LYMPHOCYTES NFR BLD: 11.7 % (ref 22–41)
MCH RBC QN AUTO: 30.4 PG (ref 27–33)
MCHC RBC AUTO-ENTMCNC: 31.3 G/DL (ref 33.7–35.3)
MCV RBC AUTO: 97 FL (ref 81.4–97.8)
MONOCYTES # BLD AUTO: 0.75 K/UL (ref 0–0.85)
MONOCYTES NFR BLD AUTO: 8.1 % (ref 0–13.4)
NEUTROPHILS # BLD AUTO: 7.11 K/UL (ref 1.82–7.42)
NEUTROPHILS NFR BLD: 76.6 % (ref 44–72)
NRBC # BLD AUTO: 0 K/UL
NRBC BLD-RTO: 0 /100 WBC
PLATELET # BLD AUTO: 363 K/UL (ref 164–446)
PMV BLD AUTO: 9.1 FL (ref 9–12.9)
POTASSIUM SERPL-SCNC: 3.6 MMOL/L (ref 3.6–5.5)
PROT SERPL-MCNC: 8.4 G/DL (ref 6–8.2)
RBC # BLD AUTO: 4.05 M/UL (ref 4.7–6.1)
SODIUM SERPL-SCNC: 139 MMOL/L (ref 135–145)
TRIGL SERPL-MCNC: 77 MG/DL (ref 0–149)
WBC # BLD AUTO: 9.3 K/UL (ref 4.8–10.8)

## 2021-07-29 PROCEDURE — 99213 OFFICE O/P EST LOW 20 MIN: CPT | Performed by: INTERNAL MEDICINE

## 2021-07-29 PROCEDURE — 80053 COMPREHEN METABOLIC PANEL: CPT

## 2021-07-29 PROCEDURE — 36415 COLL VENOUS BLD VENIPUNCTURE: CPT

## 2021-07-29 PROCEDURE — 85025 COMPLETE CBC W/AUTO DIFF WBC: CPT

## 2021-07-29 PROCEDURE — 80061 LIPID PANEL: CPT

## 2021-07-29 PROCEDURE — 99214 OFFICE O/P EST MOD 30 MIN: CPT | Performed by: INTERNAL MEDICINE

## 2021-07-29 PROCEDURE — 83036 HEMOGLOBIN GLYCOSYLATED A1C: CPT | Mod: GA

## 2021-07-29 PROCEDURE — 93010 ELECTROCARDIOGRAM REPORT: CPT | Performed by: INTERNAL MEDICINE

## 2021-07-29 PROCEDURE — 93005 ELECTROCARDIOGRAM TRACING: CPT | Performed by: NURSE PRACTITIONER

## 2021-07-29 ASSESSMENT — FIBROSIS 4 INDEX: FIB4 SCORE: 1.34

## 2021-07-29 ASSESSMENT — PATIENT HEALTH QUESTIONNAIRE - PHQ9: CLINICAL INTERPRETATION OF PHQ2 SCORE: 0

## 2021-07-29 NOTE — ASSESSMENT & PLAN NOTE
Thought secondary to chronic disease.  He is currently taking supplemental iron.  He denies bleeding.  He is due for updated labs.

## 2021-07-29 NOTE — ASSESSMENT & PLAN NOTE
Patient has been treated extensively over the past 6 years for his chronic lower extremity ulcers.  Recently, according to the wound care notes things have gotten significantly worse.  He has had a chronic Pseudomonas infection that they have not been able to eradicate.  He was seen by Dr. Israel in the limb preservation service recently.  They recommended amputation.  Patient initially agreed and they were in the process of scheduling him.  His daughter then found out about it and wanted to make sure that all options were being explored.  They have a consultation at [facility] for a second opinion scheduled for 8/10.  When patient was seen at wound care yesterday they recommended he go to the emergency room for IV antibiotics but he declined so they started him on Cipro again.  He continues to have regular wound care through home health and also the wound clinic.

## 2021-07-29 NOTE — PROGRESS NOTES
Subjective:   Goran Chavez is a 66 y.o. male here today for leg wounds and discussion of amputation, requesting labs     Chronic skin ulcer of lower leg (HCC)  Patient has been treated extensively over the past 6 years for his chronic lower extremity ulcers.  Recently, according to the wound care notes things have gotten significantly worse.  He has had a chronic Pseudomonas infection that they have not been able to eradicate.  He was seen by Dr. Israel in the limb preservation service recently.  They recommended amputation.  Patient initially agreed and they were in the process of scheduling him.  His daughter then found out about it and wanted to make sure that all options were being explored.  They have a consultation at [facility] for a second opinion scheduled for 8/10.  When patient was seen at wound care yesterday they recommended he go to the emergency room for IV antibiotics but he declined so they started him on Cipro again.  He continues to have regular wound care through home health and also the wound clinic.    Prediabetes  Last A1c was mildly elevated in January 2020 patient has not had updated labs since then.  He is concerned he may have progressed to diabetes and is requesting repeat labs.    Other hyperlipidemia  He continues on atorvastatin 40 mg daily.  He is due for updated labs.    Normocytic anemia  Thought secondary to chronic disease.  He is currently taking supplemental iron.  He denies bleeding.  He is due for updated labs.       Current medicines (including changes today)  Current Outpatient Medications   Medication Sig Dispense Refill   • ciprofloxacin (CIPRO) 750 MG Tab Take 1 tablet by mouth 2 times a day for 14 days. 28 tablet 0   • hydrOXYzine pamoate (VISTARIL) 50 MG Cap Take 1 capsule by mouth 3 times a day as needed for Itching (nausea). 20 capsule 1   • ferrous gluconate 324 (37.5 Fe) MG Tab TAKE 1 TABLET BY MOUTH IN THE MORNING WITH BREAKFAST 30 tablet 3   •  sertraline (ZOLOFT) 100 MG Tab Take 1 tablet by mouth once daily 30 tablet 5   • amitriptyline (ELAVIL) 25 MG Tab TAKE 1 TABLET BY MOUTH AT BEDTIME AS NEEDED FOR SLEEP (INCREASED DOSE) 30 tablet 5   • lisinopril (PRINIVIL) 10 MG Tab Take 1 tablet by mouth every day. 30 tablet 0   • meloxicam (MOBIC) 7.5 MG Tab TAKE 1 TABLET BY MOUTH ONCE DAILY AS NEEDED FOR MODERATE PAIN (Patient taking differently: Take 7.5 mg by mouth 1 time a day as needed (PAIN).) 30 tablet 3   • amLODIPine (NORVASC) 10 MG Tab Take 1 tablet by mouth once daily (Patient taking differently: Take 10 mg by mouth every day.) 90 tablet 3   • atorvastatin (LIPITOR) 40 MG Tab Take 1 tablet by mouth once daily (Patient taking differently: Take 40 mg by mouth every day.) 30 Tab 11   • ascorbic acid (VITAMIN C) 500 MG tablet Take 1 tablet by mouth once daily (Patient taking differently: Take 500 mg by mouth every day.) 30 Tab 5   • ferrous gluconate (FERGON) 324 (38 Fe) MG Tab Take 1 Tab by mouth every morning with breakfast. 30 Tab 5     No current facility-administered medications for this visit.     He  has a past medical history of Anxiety, Charcot's joint of left foot, non-diabetic (3/21/2016), Hepatitis C, chronic ([facility]), Hypertension, Kidney disease, Migraine, Polysubstance abuse ([facility]) (3/8/2018), Tobacco use (4/18/2016), Ulcer of left lower extremity with necrosis of muscle ([facility]) (3/21/2016), and Venous stasis ulcer ([facility]) (2017).    ROS   Denies chest pain, shortness of breath  As above in HPI     Objective:     Vitals:    07/29/21 1326   BP: 118/78   Pulse: (!) 110   Resp: 20   Temp: 36.6 °C (97.9 °F)   SpO2: 96%     Body mass index is 22.92 kg/m².   Physical Exam:  Constitutional: Alert, no distress.  Skin: Warm, dry, good turgor, no rashes in visible areas.  Extrem: legs wrapped b/l, L foot obviously deformed and swollen  Eye: Equal, round and reactive, conjunctiva clear, lids normal.  Psych: Alert and oriented x3, normal affect and  mood.      Assessment and Plan:   The following treatment plan was discussed    1. Chronic skin ulcer of lower leg (HCC)  Discussed with patient recommendations by the limb preservation service for amputation.  He wants to get a second opinion and they are planning to do that at [facility] on 8/10.  We discussed risk and benefit of it.  His daughter needs to be involved in his medical decision making.    2. Prediabetes  - HEMOGLOBIN A1C; Future    3. Other hyperlipidemia  - Lipid Profile; Future    4. Stage 3a chronic kidney disease (HCC)  - Comp Metabolic Panel; Future    5. Normocytic anemia  - CBC WITH DIFFERENTIAL; Future        Followup: Return if symptoms worsen or fail to improve.

## 2021-07-29 NOTE — ASSESSMENT & PLAN NOTE
Last A1c was mildly elevated in January 2020 patient has not had updated labs since then.  He is concerned he may have progressed to diabetes and is requesting repeat labs.

## 2021-07-30 ENCOUNTER — TELEPHONE (OUTPATIENT)
Dept: MEDICAL GROUP | Facility: MEDICAL CENTER | Age: 66
End: 2021-07-30

## 2021-07-30 NOTE — PROGRESS NOTES
LIMB PRESERVATION SERVICE   POST SURGICAL PROGRESS NOTE      HPI:  64 y.o.  with a past medical history that includes hypertension, hepatitis C, kidney disease, polysubstance abuse, nondiabetic Charcot's deformity left foot, and chronic venous stasis ulcers bilateral lower extremities admitted 4/16/2020 for Cellulitis of left lower extremity.        LPS has been consulted for evaluation of left lower leg venous stasis ulcers.  The patient has a history of chronic venous stasis ulcers and has been seen in outpatient wound clinic for treatment as well as infectious disease in October 2019 for an infected skin ulcer.  He reports that his previous venostasis ulcers had resolved approximately 1 month ago and he was discharged from the outpatient wound clinic.  He states that 1 week ago (4/12/2020) he was in the shower and noticed that he developed an ulcer on the anterior aspect of his lower leg distal to his knee and then the ulcer had continued to worsen from there radiating to the back of his left lower leg and development of new ulcers down his left lateral lower leg to his ankle.  He reports having fever, chills, and a large amount of clear drainage from his ulcers.  He states that 4/15/2020 his left lower leg began to swell and developed redness.  He was seen in the outpatient wound center where he was found to have significant swelling and erythema to his left lower extremity, weeping from superficial tissue, and foul-smelling odor.    Patient proceeded to ED.  ESR 97, CRP 31.7.  He was started on vancomycin and Zosyn IV and admitted under hospital services.     SURGERY DATE: 04/22/20  PROCEDURE: with Dr. Israel  1.  Left leg irrigation and debridement, multiple compartments, deep down to   the level of muscle.  2.  Left fasciotomy anterior and posterior compartments.  3.  Left placement of wound VAC.    SURGERY DATE: 04/24/20  PROCEDURE: with Dr. Israel  1.  Left leg irrigation and debridement, multiple  Information was e-mailed regarding where to get DME.  Placed received were for wheelchairs, walkers and respiratory products.     Called [facility] and spoke to DME specialist there.  She notified writer that Medicare does not cover blood pressure cuffs.  However, some secondary plans have available coverage for over the counter spending.  Patient would need to contact her insurance company to see if she has something like this.   compartments.  2.  Placement of wound VAC.    SURGERY DATE: 04/27/20  PROCEDURE: with Dr. John  1.  Left leg irrigation and debridement, multiple compartments.  2.  Placement of wound VAC.    SURGERY DATE: 05/4/2020 with Dr. Israel and Dr. John  PROCEDURE: Left leg irrigation and debridement.         4/22/2020: Patient denies fevers, chills, nausea, vomiting.  Complains of pain with inspection of lower leg ulcers.  Tract with purulent drainage noted yesterday by wound team.  Repeat CT ordered and was negative for abscess or osteomyelitis.  Patient has been n.p.o. since last night.  Dressings that were changed yesterday are completely saturated today.   Patient also has Charcot foot to left foot and complains of severe pain to foot when walking.  4/28/2020: Patient denies fevers, chills, nausea, vomiting.  Pain well controlled.   LPS was called by bedside RN to assess left LE.  Wound VAC placed last night, clotted off when patient arrived to floor, troubleshoot by night RN, unable to resolve.  NOC RN removed drape (not the foam) and the wound started bleeding.  NOC held pressure to wound bed, and applied pressure dressing.    4/29/2020: Patient's Left LE dressing intact, removed pressure dressing with wound care RN, Oriana Barcenas.  Still open bleeding with light trickle of, used AgNO3 to help stop small bleeds, larger bleeds pressure dressing replaced by wound RNOriana.   5/01/2020: Patient denies fever, chills, n/v.  No complaints of pain.  Dressing removed and wounds assessed.  Wound VAC was placed on Left LE Anterior/Lateral wound.  2 layer compression wrap placed to left LE.  Plan for patient to go to surgery on Monday.   5/5/2020: POD #1 S/P left leg I&D.  Denies any fevers, chills, nausea, vomiting.  Seen with wound team during VAC placement to left lower leg.  5/6/2020: POD #2 S/P left leg I&D.  N.p.o. for possible surgery.  Surgery canceled.  Reschedule for next week.  Wound team to apply veraflo to left  lateral calf ulcer  5/8/2020: POD #4 S/P left leg I&D.  N.p.o. for possible surgery today.  LLE ulcers seen with wound team.  Tendon visible, viable, clean to lateral calf.  Tract proximally has decreased.  Plan for split-thickness skin graft next week.  Continue with veraflo to lateral calf and regular VAC to posterior medial lower leg.  5/11/2020: POD #7 S/P left leg I&D.  Seen with Wound team for veraflo VAC change. Tendon remains visible to lateral calf, tract at 11:00 of 2.5cm.  Postpone STSG until next week.  5/13/2020: POD #9.  Seen during veraflo wound VAC change with wound team.  Tract remains the same at 2.5 cm, tendon visible with minimal granular buds.  Surgery next week with Dr. Israel for possible split-thickness skin graft  5/18/2020: POD#14. Patient seen during wound vac change with wound care team. Tract remains the same at 2.5cm at 11:00, tendon visible with minimal granular buds- minimally changed from previous images on 5/13/2020. Notified orthopedic surgeon- surgery today cancelled.         PERTINENT LPS RESULTS:   No new pertinent results    SURGICAL SITE EXAM:      BP (P) 156/83   Pulse (P) 69   Temp (P) 37.2 °C (98.9 °F) (Temporal)   Resp (P) 20   Ht 1.829 m (6')   Wt 82.2 kg (181 lb 3.5 oz)   SpO2 (P) 92%   BMI 24.58 kg/m²     Pedal Pulses: palpable pulses  Sensation: LOPS  Foot warm to touch    Left lateral calf:   100% red granular tissue. Except where tendon is exposed. Minimal increase in granular budding of tendon.   2.5cm tract in 11:00 position.   No active drainage, erythema, or odor. Trace edema.       Left posterior medial ankle:  Small incision approximated with sutures.   Periwound maceration present.   Full thickness wound with red granular tissue.   No erythema, odor, active drainage.   Trace edema.           Left anterior lower leg ulcer:  Full thickness ulcer with red granular tissue. Small amount of active serous drainage present.   No erythema or odor.   Trace  edema.           Wound care completed by with Sigrid ROGERS wound team.  See wound care note and flowsheet for additional details.       INFECTION MANAGEMENT:    Results from last 7 days   Lab Units 05/18/20  0245 05/17/20  0253 05/16/20  1003 05/16/20  0110 05/15/20  0052 05/14/20  0044   WBC 1501 K/uL 6.7 5.9 5.9 5.7 5.6 6.2   PLATELET COUNT 1518 K/uL 168 149* 144* 143* 143* 143*     Wound culture results:   Results     Procedure Component Value Units Date/Time    SARS-CoV-2, PCR (In-House) [[id number]] Collected:  05/17/20 1741    Order Status:  Completed Updated:  05/17/20 1855     SARS-CoV-2 Source NP Swab     SARS-CoV-2 by PCR NotDetected     Comment: [facility] providers: PLEASE REFER TO DE-ESCALATION AND RETESTING PROTOCOL  on [email or web address]  **The [employer] GeneXpert Xpress SARS-CoV-2 Test has been made available for  use under the Emergency Use Authorization (EUA) only.         Narrative:       [id number] LUISA OVALLES.    COVID/SARS CoV-2 [[id number]] Collected:  05/17/20 1741    Order Status:  Completed Specimen:  Respirate from Nasopharyngeal Updated:  05/17/20 1753     COVID Order Status Received    Narrative:       [id number] LUISA OVALLES.               ASSESSMENT/PLAN:   Patient POD # 14 S/P left lower leg I&D.  Patient has had 4 I&D's to this area thus far.  Patient is progressing slowly.  The tract to the 11:00 position remains unchanged. Granular tissue to all wound beds, continues to develop additional granular tissue. Tendon to left lateral leg exposed. Very small amount of increase in budding over tendon. Wound largely unchanged from previous exam on 5/13/2020. Patient was to tentatively have split thickness skin grafting today. Updated Dr. John of patient's exposed tendon and slow/lack of progress. Surgery cancelled. Continue with veraflo wound vac. Discussed with orthopedist that patient is slow to progress with wound healing and may be able to discharge home with home health for wound vac  "changes and follow up at wound care clinic. Patient states he lives locally with his daughter but does not think that his daughter would \"be ok\" with him at her home needing this extent of care. Recommend looking for placement to SNF vs Rehab or LTAC to continue wound care therapy until tendon is covered or until wound itself resolves.     Wound care:  - wound team to manage wound care.   -Regular wound VAC to left posterior medial lower leg ulcers   -VERAFLO to left lateral calf and anterior proximal leg,   -2 layer compression wrap to LLE to control edema  -veraflo VAC change and 2 layer compression wrap 3x/wk     Vascular status:   - Palpable pulses    Antibiotics:   -meropenem and linezolid discontinued by ID 5/18/2020  ID signed off.     Weight Bearing Status:   -Weight bearing as tolerated    Offloading:   -offload with pillows    PT/OT :   -involved, last seen by OT 5/11/2020      Plan to return to O.R.:   STSG surgery today cancelled as minimal change to tendon and tract to left lateral calf.   Discussed with Dr. John.   Recommend placement discharge home with home health and wound care clinic follow up vs SNF vs rehab vs LTAC placement until tendon is covered and patient able to have surgery or wound resolution.       DISCHARGE PLAN:    Disposition: recommend SNF vs LTAC vs rehab    Follow-up: pending    Discussed with: pt, RN, , Sigrid Hoyt RN wound team      GREG Jennings    If any questions or concerns, please call [phone]    "

## 2021-07-30 NOTE — TELEPHONE ENCOUNTER
Phone Number Called: [phone] (home)     Call outcome: Spoke to pt's daughter    Message: Informed her about pt's results. Pt understood and did not have any further questions.

## 2021-08-04 ENCOUNTER — OFFICE VISIT (OUTPATIENT)
Dept: WOUND CARE | Facility: MEDICAL CENTER | Age: 66
End: 2021-08-04
Attending: NURSE PRACTITIONER
Payer: MEDICARE

## 2021-08-04 VITALS
TEMPERATURE: 96.6 F | HEART RATE: 100 BPM | SYSTOLIC BLOOD PRESSURE: 123 MMHG | OXYGEN SATURATION: 95 % | RESPIRATION RATE: 18 BRPM | DIASTOLIC BLOOD PRESSURE: 77 MMHG

## 2021-08-04 DIAGNOSIS — M79.605 PAIN IN LEFT LEG: ICD-10-CM

## 2021-08-04 DIAGNOSIS — L97.922 SKIN ULCER OF LEFT LOWER LEG WITH FAT LAYER EXPOSED (HCC): Primary | ICD-10-CM

## 2021-08-04 DIAGNOSIS — L08.9 WOUND INFECTION: ICD-10-CM

## 2021-08-04 DIAGNOSIS — M21.962 ACQUIRED DEFORMITY OF LEFT ANKLE AND FOOT: ICD-10-CM

## 2021-08-04 DIAGNOSIS — T14.8XXA WOUND INFECTION: ICD-10-CM

## 2021-08-04 DIAGNOSIS — I87.8 CHRONIC VENOUS STASIS: ICD-10-CM

## 2021-08-04 PROCEDURE — 99214 OFFICE O/P EST MOD 30 MIN: CPT

## 2021-08-04 PROCEDURE — 11042 DBRDMT SUBQ TIS 1ST 20SQCM/<: CPT | Performed by: NURSE PRACTITIONER

## 2021-08-04 PROCEDURE — 11045 DBRDMT SUBQ TISS EACH ADDL: CPT | Performed by: NURSE PRACTITIONER

## 2021-08-04 PROCEDURE — 99213 OFFICE O/P EST LOW 20 MIN: CPT | Mod: 25 | Performed by: NURSE PRACTITIONER

## 2021-08-04 PROCEDURE — 11042 DBRDMT SUBQ TIS 1ST 20SQCM/<: CPT

## 2021-08-04 PROCEDURE — 11045 DBRDMT SUBQ TISS EACH ADDL: CPT

## 2021-08-04 ASSESSMENT — ENCOUNTER SYMPTOMS
DEPRESSION: 1
WHEEZING: 0
MYALGIAS: 0
COUGH: 0
SHORTNESS OF BREATH: 0
FEVER: 0
VOMITING: 0
HEADACHES: 0
CHILLS: 0
NAUSEA: 0
DIZZINESS: 0
PALPITATIONS: 0
WEAKNESS: 0
CLAUDICATION: 0

## 2021-08-04 NOTE — PATIENT INSTRUCTIONS
After Visit Summary Wound Care Instructions    Nutrition - Patient instructed increased protein diet unless contraindicated in renal failure (meat, eggs, fish, yogurt, cottage cheese, beans), use of multivitamin with minerals and Arginaid supplementation (check if ok with Primary Care Provider first).    Infection -  instructed signs and symptoms of infection, increased redness and swelling, localized heat over wound and surrounding area/fever/chills/nausea and vomiting, when to call doctor or go to Emergency Room.     Dressing Changes - Instructed patient rationale for wound care products. Instructed to keep dressings clean and dry, shower on clinic days right before coming in. Change your dressing if it becomes soiled, soaked, or falls off.      Dressing change procedure   Remove old dressing.  Cleanse the wound with saline and gauze.  Dry surrounding skin with gauze, then apply skin prep wipe, allow to dry, then apply zinc oxide barrier paste.  Cut a small piece of Aquacel silver dressing, and pack loosely into wound to fill the dead space and the undermining areas.   Cut a larger piece of Aquacel silver and place this on top.   Cover with ABD dressing and secure with the kerlix roll. Change daily and as needed at home, come to wound clinic 1 x week.   Change your dressing if it becomes soiled, soaked, or falls off.    Instructed patient about fall prevention.      Compression Wraps - Avoid prolonged standing or sitting without elevating your legs.  Apply tubigrip to your legs ending 2 fingers below back of knee without wrinkles.   Knee-high gradient compression stockings to both lower legs     Questions - should you have any questions regarding your home care instructions, please contact the wound center at [phone]. If after hours, contact your primary care physician or go to the hospital emergency room.

## 2021-08-04 NOTE — PROGRESS NOTES
Provider Encounter- Full Thickness wound    HISTORY OF PRESENT ILLNESS  Wound History:    START OF CARE IN CLINIC: 5/20/2020               REFERRING PROVIDER: KEV Keith                 WOUND ETIOLOGY: venous,  likely mixed etiology              LOCATION: Left lower leg, multiple wounds                                   Right lower leg, multiple wounds-first observed in clinic on initial evaluation, 6/5/2020.  Resolved 9/2/2020                HISTORY: Patient is very well-known to this clinic from previous treatment of wounds to his left lower extremity.  He was discharged from the clinic in March 2020 due to full resolution of his wound.  He returned to the clinic approximately 3 weeks later, on 4/16, with cellulitis, edema, and large open wounds to his left lower leg, and was sent directly to [facility] emergency room.  He was admitted for IV antibiotics and surgical intervention.  On 4/22/2020, he underwent an I&D of his left lower extremity, fasciotomy, and VAC placement.  In the following weeks he underwent surgery 3 more times for irrigation and debridement.  He was discharged home on 5/21, on the VAC, with home health and a referral to [facility].      4/21/2021: Patient was recently discharged from [facility] following being sent from the wound care clinic.  There the patient received IV antibiotics and underwent arterial imaging.  Patient returns to [facility] for care of bilateral lower extremity wounds.      Pertinent Medical History:  Anxiety, Charcot's joint of left foot, non-diabetic (3/21/2016), Chronic congestive heart failure (HCC) (11/16/2017), Hepatitis C, chronic ([facility]), Hypertension, Migraine, Polysubstance abuse ([facility]) (3/8/2018), Tobacco use (4/18/2016), Ulcer of left lower extremity with necrosis of muscle ([facility]) (3/21/2016), and Venous stasis ulcer ([facility]) (2017).                ETIOLOGY HISTORY:  Vascular Surgeon: Dr. White. Compression Circ-aid. Varicose Veins none  visible     TOBACCO USE: Patient denies; patient also denies alcohol and recreational drug use    Patient's problem list, allergies, and current medications reviewed and updated in Epic    Interval History:  Interval History thinned 2/10/2021.  Please see previous notes for complete interval history.       4/21/2021: Clinic visit with KEV Luna. Patient states that they are feeling well today.  Patient denies fever, chills, nausea, vomiting, lightheadedness, dizziness, shortness of breath and chest pain.     4/28/2021: Clinic visit with KEV Luna. Patient states that they are feeling well today.  Patient denies fever, chills, nausea, vomiting, lightheadedness, dizziness, shortness of breath and chest pain.  Patient reports that home health came on Friday and Saturday of last week and not the 4 times as ordered.  Patient also reports that he took a shower every day as instructed.  Later in the conversation the patient stated that he only took a shower on the days that home health came which is not every day.  We also called home health who went to the patient's home all 4 times as ordered however the patient did not answer the door.  Home health left bandages for patient to dress his wound.  Patient reports no bandages were left.  Patient is noncompliant with plan of care and noncompliant with home health assistance.  Patient's wounds are filled with Pseudomonas new wounds to the right lower extremity.    5/5/2021 : Clinic visit with KEV Brantley, RYAN, DONNIEN, CFBARBER.  Kodi states he is feeling well overall, denies fevers, chills, nausea, vomiting, cough or shortness of breath.  He has been taking his Cipro as prescribed, states he has 2 more pills left, will be completing these tomorrow morning.  Home health continues to see him daily during the week for dressing changes.  His daughter is changing his dressing on the weekends.  Is also been showering daily washing his  legs.    5/12/2021: Clinic visit with KEV Luna. Patient states that they are feeling well today.  Patient denies fever, chills, nausea, vomiting, lightheadedness, dizziness, shortness of breath and chest pain.  Patient reports that he has been taking showers daily as instructed.  Patient's pseudomonal load has significantly decreased left lower extremity is improving wounds are more shallow.  Wound to right lower extremity improving.    5/19/2021: Clinic visit with KEV Luna. Patient states that they are feeling well today.  Patient denies fever, chills, nausea, vomiting, lightheadedness, dizziness, shortness of breath and chest pain. Patients wound beds have improved with minimal pseudomonal levels.  Patient reports that he is showering every day.  The quality of the tissue has also improved a great deal with the largest wound bed decreasing in dimensions.  Continue current plan of care.    5/28/2021 : Clinic visit with Dr. Ayon.  Returns for a follow-up visit has questions if there is any soap that we recommend advised him not to use antibacterial soap but good gentle soap and lots of water.  He denies chills fever headache cough chest pain abdominal pain or other acute medical issues.    6/2/2021: Clinic visit with KEV Luna. Patient states that they are feeling well today.  Patient denies fever, chills, nausea, vomiting, lightheadedness, dizziness, shortness of breath and chest pain.  Patient's pseudomonal infection is worsening.  Patient is known to be chronically colonized.  Patient's wounds are deteriorating.  Patient has new wounds developing.  Therefore I have ordered the patient a 10-day course of ciprofloxacin.  Patient is to continue to take a shower daily with soap and water.  Rolled edges taken down in clinic today to the large left lateral wound.    6/9/2021: Clinic visit with KEV Luna. Patient states that they are feeling well today.  Patient  denies fever, chills, nausea, vomiting, lightheadedness, dizziness, shortness of breath and chest pain.  Patient's wounds are becoming more fibrotic in nature.  Patient has lost granulation tissue to the superior left lower lateral wound.  Patient is finishing up his course of ciprofloxacin patient should finish course on on 6/12/2020.    6/16/2021: Clinic visit with KEV Luna. Patient states that they are feeling well today.  Patient denies fever, chills, nausea, vomiting, lightheadedness, dizziness, shortness of breath and chest pain.  Patient has done a good job at hygiene decreasing pseudomonal load.  Patient still has evidence of Pseudomonas growth to left lower extremity significantly improved however.  Patient's tissue is fibrotic in nature.  Patient has a follow-up appointment next Friday in LPS rounds.  Patient will need at minimum in my opinion surgical debridement to get below fibrous tissue and restart the granulation process.  Patient has completed his course of ciprofloxacin.  Please review arterial ultrasound with Dr. Rhodes query 50 to 75% stenosis could this be contributing to nonhealing wounds according to the arteries affected?    6/23/2021 : Clinic visit with KEV Brantley, FNP-BC, CWOCN, CFCN.  Goran states he is feeling well overall.  [facility] continues to see him daily for dressing changes in between clinic visits.  He states his daughter is also helping with dressing changes.  He is showering daily.  He is scheduled to be seen in LPS rounds on Friday.    6/30/2021: Clinic visit with KEV Luna. Patient states that they are feeling well today.  Patient denies fever, chills, nausea, vomiting, lightheadedness, dizziness, shortness of breath and chest pain.  Patient was placed on ciprofloxacin and Augmentin by Jennifer Robb infectious disease specialist, due to a positive culture on 6/25/2021 for heavy growth of Pseudomonas and moderate growth of  Streptococcus pyogenes.  Patient informed in clinic today that he needs to  both antibiotics and initiate treatment this evening.  MRI was also placed by ID.  We had the patient call imaging, in clinic, patient has MRI scheduled for July 9.    I&D to be considered if imaging is negative.  Patient will need surgical debridement.  Clinical debridement is insufficient due to the significant area of the patient's wound, fibrotic nature and thick epibole edges.  Following MRI results, will discuss with Dr. Israel regarding possible surgical debridement.    7/7/2021: Clinic visit with KEV Luna. Patient states that they are feeling well today.  Patient denies fever, chills, nausea, vomiting, lightheadedness, dizziness, shortness of breath and chest pain.  Patient's left lower extremity has significantly decreased amount of Pseudomonas.  However, patient still has a significant amount of fibrous tissue throughout the left lower extremity wound beds.  Patient will need in my opinion surgical debridement with wound VAC, vera flow and probable skilled nursing placement to allow his wounds to heal.  I do not believe this patient can manage these wounds at his home nor do I believe they will heal without significant support.  Patient to complete MRI on 7/9/2021 following this we will refer patient back to LPS rounds for considerations for surgical debridement and wound VAC therapy.    7/14/2021: Clinic visit with KEV Luna. Patient states that they are feeling well today.  Patient denies fever, chills, nausea, vomiting, lightheadedness, dizziness, shortness of breath and chest pain.  Thorough discussion was had with the patient regarding surgical debridement, vera flow wound VAC therapy and duration in skilled nursing facility.  Other options including possible amputation have been previously discussed with the patient.  He may be contemplating possible amputation versus surgical procedure  which she would not consider in the past.  I will place the patient on LPS rounds to see Dr. Israel for consideration of these procedures.  Jennifer Robb saw the patient in clinic today we have both reviewed the MRI which is negative for osteomyelitis or abscess formation.    7/28/2021: Clinic visit with KEV Luna. Patient states that they are feeling well today.  Patient denies fever, chills, nausea, vomiting, lightheadedness, dizziness, shortness of breath and chest pain.  Patient presents again in clinic with a significant amount of pseudomonal drainage.  Patient's wound has significantly degraded.  I recommended the patient go to [facility] ER for IV antibiotics and second opinion.  Patient was seen in LPS rounds where they recommended amputation BKA of left lower extremity.  Patient was first agreeable to this plan.  However after further consultation with his daughter they want to pursue a second opinion.  Patient refused today to go to [facility] ER which I recommended.  Therefore I will obtain an EKG and start the patient on ciprofloxacin 750 mg twice daily.  Patient is to go directly to the ER for increased signs and symptoms of infection including but not limited to increasing erythema, edema, fever, chills, malaise/fatigue, increased pain or any other symptoms out of the ordinary.  Wounds to right lower extremity now are infected with Pseudomonas.    8/4/2021: Clinic visit with KEV Luna. Patient states that they are feeling well today.  Patient denies fever, chills, nausea, vomiting, lightheadedness, dizziness, shortness of breath and chest pain.  Patient has initiated Cipro with an end date of 8/11/2021.  Patient has reduction in pseudomonal load however still significant to left lower extremity.  Patient informed that he should call [facility] to establish a wound care appointment for second opinion.  Patient given normal vitals number to call  and establish.    REVIEW OF SYSTEMS:   Review of Systems   Constitutional: Negative for chills and fever.        States he is eating well, appetite normal   Respiratory: Negative for cough, shortness of breath and wheezing.    Cardiovascular: Positive for leg swelling. Negative for chest pain, palpitations and claudication.        Chronic swelling in legs, for several years   Gastrointestinal: Negative for nausea and vomiting.   Musculoskeletal: Positive for joint pain. Negative for myalgias.        Chronic   Skin:        Multiple diffuse open wounds to left lower extremity a few new open wounds to right lower extremity.  Pseudomonal drainage to bilateral lower extremities.    Neurological: Negative for dizziness, weakness and headaches.   Psychiatric/Behavioral: Positive for depression.       PHYSICAL EXAMINATION:   /77 (BP Location: Left arm, Patient Position: Sitting)   Pulse 100   Temp 35.9 °C (96.6 °F) (Temporal)   Resp 18   SpO2 95%     Physical Exam  Constitutional:       General: He is not in acute distress.     Appearance: He is not diaphoretic.   HENT:      Head: Normocephalic and atraumatic.   Eyes:      Conjunctiva/sclera: Conjunctivae normal.      Pupils: Pupils are equal, round, and reactive to light.   Pulmonary:      Effort: Pulmonary effort is normal. No respiratory distress.      Breath sounds: No wheezing.   Musculoskeletal:         General: Deformity present. Normal range of motion.      Cervical back: Neck supple.      Comments: Left charcot foot and ankle     Skin:     General: Skin is warm.      Comments: Large full-thickness wounds to lower extremities  Patient colonized with Pseudomonas to bilateral lower extremities.    Neurological:      Mental Status: He is alert and oriented to person, place, and time.   Psychiatric:         Mood and Affect: Mood and affect normal.         Judgment: Judgment normal.                                                           WOUND ASSESSMENT             Wound 06/05/20 Full Thickness Wound Leg Left --Left Lateral LE (Active)   Wound Image    08/04/21 1400   Site Assessment Pink;Yellow 08/04/21 1400   Periwound Assessment Hemosiderin Staining;Scar tissue;Edema 08/04/21 1400   Margins Epibole (rolled edges);Attached edges 08/04/21 1400   Drainage Amount Copious 07/28/21 1017   Drainage Description Serosanguineous 07/28/21 1017   Treatments Cleansed;Topical Lidocaine;Provider debridement 07/28/21 1017   Wound Cleansing Foam Cleanser/Washcloth 07/28/21 1017   Periwound Protectant Skin Moisturizer;Barrier Paste 07/28/21 1017   Dressing Cleansing/Solutions Not Applicable 07/28/21 1017   Dressing Options Hydrofiber Silver;Absorbent Abdominal Pad;Dry Roll Gauze;Hypafix Tape;Tubigrip 07/28/21 1017   Dressing Changed Changed 07/21/21 0900   Dressing Status Clean;Dry;Intact 04/28/21 1400   Dressing Change/Treatment Frequency Daily, and As Needed 07/21/21 0900   Non-staged Wound Description Full thickness 08/04/21 1400   Wound Length (cm) 16 cm 08/04/21 1400   Wound Width (cm) 7 cm 08/04/21 1400   Wound Depth (cm) 0.4 cm 08/04/21 1400   Wound Surface Area (cm^2) 112 cm^2 08/04/21 1400   Wound Volume (cm^3) 44.8 cm^3 08/04/21 1400   Post-Procedure Length (cm) 16.1 cm 08/04/21 1400   Post-Procedure Width (cm) 7 cm 08/04/21 1400   Post-Procedure Depth (cm) 0.5 cm 08/04/21 1400   Post-Procedure Surface Area (cm^2) 112.7 cm^2 08/04/21 1400   Post-Procedure Volume (cm^3) 56.35 cm^3 08/04/21 1400   Wound Healing % 18 08/04/21 1400   Wound Bed Granulation (%) 40 % 05/28/21 1100   Wound Bed Slough (%) 100 % 06/02/21 1327   Wound Bed Granulation (%) - Post-Procedure 100 % 05/19/21 1000   Wound Bed Slough % - (Post-Procedure) 90 % 06/16/21 1339   Tunneling (cm) 0 cm 07/28/21 1017   Undermining (cm) 0 cm 07/28/21 1017   Wound Odor None 07/28/21 1017   Exposed Structures None 07/28/21 1017       Wound 01/20/21 --Left Medial/Posterior/Lateral LE (Active)   Wound Image     08/04/21  1400   Site Assessment Purple;Yellow;Red;Grey 08/04/21 1400   Periwound Assessment Hemosiderin Staining;Edema;Scar tissue;Pink 08/04/21 1400   Margins Epibole (rolled edges) 08/04/21 1400   Drainage Amount Copious 07/28/21 1017   Drainage Description Serosanguineous 07/28/21 1017   Treatments Cleansed;Topical Lidocaine;Provider debridement 07/28/21 1017   Wound Cleansing Foam Cleanser/Washcloth 07/28/21 1017   Periwound Protectant Skin Moisturizer;Barrier Paste 07/28/21 1017   Dressing Cleansing/Solutions Not Applicable 07/28/21 1017   Dressing Options Hydrofiber Silver;Absorbent Abdominal Pad;Dry Roll Gauze;Hypafix Tape;Tubigrip 07/28/21 1017   Dressing Changed Changed 07/21/21 0900   Dressing Change/Treatment Frequency Daily, and As Needed 07/21/21 0900   Non-staged Wound Description Full thickness 08/04/21 1400   Wound Length (cm) 16 cm 08/04/21 1400   Wound Width (cm) 23 cm 08/04/21 1400   Wound Depth (cm) 0.5 cm 08/04/21 1400   Wound Surface Area (cm^2) 368 cm^2 08/04/21 1400   Wound Volume (cm^3) 184 cm^3 08/04/21 1400   Post-Procedure Length (cm) 16 cm 08/04/21 1400   Post-Procedure Width (cm) 23 cm 08/04/21 1400   Post-Procedure Depth (cm) 0.7 cm 08/04/21 1400   Post-Procedure Surface Area (cm^2) 368 cm^2 08/04/21 1400   Post-Procedure Volume (cm^3) 257.6 cm^3 08/04/21 1400   Wound Healing % -[id number] 08/04/21 1400   Wound Bed Granulation (%) 70 % 05/28/21 1100   Wound Bed Slough (%) 85 % 07/28/21 1017   Wound Bed Granulation (%) - Post-Procedure 25 % 06/02/21 1327   Tunneling (cm) 0 cm 07/28/21 1017   Undermining (cm) 0 cm 07/28/21 1017   Wound Odor None 07/28/21 1017   Exposed Structures None 07/28/21 1017       Wound 04/11/21 Leg Lower Right (Active)       Wound 04/11/21 Leg Lower Left (Active)       Wound 04/11/21 Heel Right DTI (Active)       Wound 04/28/21 --Right Medial LE (Active)   Wound Image    08/04/21 1400   Site Assessment Yellow 08/04/21 1400   Periwound Assessment Hemosiderin Staining;Scar  tissue;Dry 08/04/21 1400   Margins Unattached edges 08/04/21 1400   Drainage Amount Large 08/04/21 1400   Drainage Description Serosanguineous;Serous 08/04/21 1400   Treatments Cleansed;Topical Lidocaine;Provider debridement 08/04/21 1400   Wound Cleansing Foam Cleanser/Washcloth 08/04/21 1400   Periwound Protectant Skin Protectant Wipes to Periwound;Barrier Paste 08/04/21 1400   Dressing Cleansing/Solutions Normal Saline 08/04/21 1400   Dressing Options Hydrofiber Silver;Absorbent Abdominal Pad;Dry Roll Gauze;Hypafix Tape;Tubigrip 07/28/21 1017   Dressing Changed Changed 07/21/21 0900   Dressing Status Clean;Dry;Intact 04/28/21 1400   Dressing Change/Treatment Frequency Daily, and As Needed 06/30/21 1400   Non-staged Wound Description Full thickness 08/04/21 1400   Wound Length (cm) 1.6 cm 08/04/21 1400   Wound Width (cm) 1.3 cm 08/04/21 1400   Wound Depth (cm) 0.3 cm 08/04/21 1400   Wound Surface Area (cm^2) 2.08 cm^2 08/04/21 1400   Wound Volume (cm^3) 0.62 cm^3 08/04/21 1400   Post-Procedure Length (cm) 1.7 cm 08/04/21 1400   Post-Procedure Width (cm) 1.4 cm 08/04/21 1400   Post-Procedure Depth (cm) 0.4 cm 08/04/21 1400   Post-Procedure Surface Area (cm^2) 2.38 cm^2 08/04/21 1400   Post-Procedure Volume (cm^3) 0.95 cm^3 08/04/21 1400   Wound Healing % -9 08/04/21 1400   Wound Bed Granulation (%) 80 % 05/19/21 1000   Wound Bed Slough (%) 100 % 07/28/21 1017   Wound Bed Granulation (%) - Post-Procedure 100 % 05/19/21 1000   Wound Bed Slough % - (Post-Procedure) 25 % 06/02/21 1327   Tunneling (cm) 0 cm 07/28/21 1017   Undermining (cm) 0 cm 07/28/21 1017   Wound Odor None 07/28/21 1017   Exposed Structures None 07/28/21 1017       Wound 05/19/21 Venous Ulcer R medial ankle (Active)   Wound Image    08/04/21 1400   Site Assessment Yellow 08/04/21 1400   Periwound Assessment Hemosiderin Staining;Scar tissue;Dry 08/04/21 1400   Margins Unattached edges 08/04/21 1400   Closure Secondary intention 05/19/21 1000    Drainage Amount Large 08/04/21 1400   Drainage Description Serosanguineous;Green 08/04/21 1400   Treatments Cleansed;Provider debridement;Topical Lidocaine 08/04/21 1400   Wound Cleansing Foam Cleanser/Washcloth 08/04/21 1400   Periwound Protectant Skin Protectant Wipes to Periwound;Barrier Paste 08/04/21 1400   Dressing Cleansing/Solutions Not Applicable 08/04/21 1400   Dressing Options Hydrofiber Silver;Absorbent Abdominal Pad;Dry Roll Gauze 08/04/21 1400   Dressing Changed Changed 08/04/21 1400   Dressing Status Clean;Dry;Intact 08/04/21 1400   Dressing Change/Treatment Frequency Daily, and As Needed 06/30/21 1400   Non-staged Wound Description Full thickness 08/04/21 1400   Wound Length (cm) 3.7 cm 08/04/21 1400   Wound Width (cm) 1.4 cm 08/04/21 1400   Wound Depth (cm) 0.3 cm 08/04/21 1400   Wound Surface Area (cm^2) 5.18 cm^2 08/04/21 1400   Wound Volume (cm^3) 1.55 cm^3 08/04/21 1400   Post-Procedure Length (cm) 3.8 cm 08/04/21 1400   Post-Procedure Width (cm) 1.4 cm 08/04/21 1400   Post-Procedure Depth (cm) 0.4 cm 08/04/21 1400   Post-Procedure Surface Area (cm^2) 5.32 cm^2 08/04/21 1400   Post-Procedure Volume (cm^3) 2.13 cm^3 08/04/21 1400   Wound Healing % -1838 08/04/21 1400   Wound Bed Granulation (%) 80 % 05/19/21 1000   Wound Bed Slough (%) 100 % 07/28/21 1017   Wound Bed Granulation (%) - Post-Procedure 100 % 05/19/21 1000   Wound Bed Slough % - (Post-Procedure) 0 % 06/02/21 1327   Tunneling (cm) 0 cm 07/28/21 1017   Undermining (cm) 0 cm 07/28/21 1017   Wound Odor None 07/28/21 1017   Exposed Structures None 07/28/21 1017              PROCEDURE: Excisional debridement of left lower extremity wounds   -2% viscous lidocaine applied topically to wound beds for approximately 10 minutes prior to debridement  -4 mm curette used to excise thick layer of slough from all wound beds.  Excisional debridement was performed to remove devitalized tissue from all wounds.  Total area debrided approximately (100%  accuracy of wound measurement difficult due to the diffuse nature of the wounds) 488.7 cm²  debrided into the subcutaneous layer of all wounds.    -Bleeding controlled with manual pressure.    -Wounds cleansed with normal saline  -Patient tolerated the procedure well, without c/o pain or discomfort.         Pertinent Labs and Diagnostics:    Labs:     A1c:   Lab Results   Component Value Date/Time    HBA1C 5.5 2021 03:21 PM      Imagin/3/2020-MRI left tibia-fibula with and without contrast  IMPRESSION:     Moderate lateral leg cellulitis and underlying myositis     No abscess or osteomyelitis    VASCULAR STUDIES: BHUPINDER 2019   Right.    Doppler waveform of the common femoral artery is of high amplitude and    triphasic.    Doppler waveforms at the ankle are brisk and triphasic.    Ankle-brachial index is normal.       Left.    Could not perform ankle-brachial index due to large blisters/ulcers.   Normal toe-brachial index: 0.94   Doppler waveform of the common femoral artery is of high amplitude and    triphasic.    Biphasic waveforms seen at the ankle.     WOUND CULTURE:    2020: Culture collected in clinic  Culture Result Abnormal   Staphylococcus aureus   Light growth     Culture Result Abnormal   Pseudomonas aeruginosa   Light growth     Culture Result Abnormal   Enterococcus faecalis   Light growth              ASSESSMENT AND PLAN:     1. Skin ulcer of left lower leg with fat layer exposed (HCC)  Comment: Patient has history of recurring ulcerations to left lower extremity.  Has undergone ablation procedures by Dr. White.  Discharged from [facility] in 2020 due to healing, returned approximately 3 weeks later much worse.  Hospitalized from  until 2020 underwent multiple surgical debridements.    21: Wound area to left lower extremity has deteriorated.  Colonized Pseudomonas covering the majority of the left lower extremity.  -Excisional debridement of wounds in clinic  today, necessary to help control slough.    -Patient underwent bilateral lower extremity arterial ultrasound left superficial femoral, profunda femoral, popliteal and tibial artery stenosis 50 to 75%.  No stenosis to the right leg.  There is no plan for arterial intervention at this time per Dr. Rhodes.    -Patient's QTC is within defined limits.  Patient is currently taking Cipro with an end date of 8/11/2021.  -Patient was seen in [facility] rounds on 7/17/2021 at that point surgical and nonsurgical options were discussed by Dr. Israel.  Nonsurgical options included suppressive Cipro side effects discussed including tendon rupture and resistance to Cipro for Pseudomonas.  Surgical options included staged BKA procedure to include BKA, bone, tissue culture with wound VAC.  Patient discussed with with his daughter and wanted a second opinion patient was referred to [facility] see interval history above    Wound care: Hydrofiber silver, absorbent abdominal pad, dry roll gauze, Tubigrip    2. Chronic venous stasis  Comments: Hemosiderin staining, brawny edema, scarring- findings consistent with CVI.  Pt established at [facility], underwent ablation procedures in 2019.     -Patient was seen by Dr. Rhodes at [facility] no further plans for arterial intervention at this time.  May need to review with Dr. Rhodes if there is any necessity to reevaluate left lower extremity perfusion.    3. Acquired deformity of left ankle and foot  Comments: Charcot deformity of foot and ankle.  Very little ROM of ankle.  Patient was referred to orthopedics previously, however did not make appointment.    4.  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   -Patient given a 14-day course of Cipro to initiate following EKG and determination of normal limits on QTc interval.  QTC within normal limits patient has initiated Cipro.  -Patient to shower daily with soap and  water dry wound beds thoroughly and reapply dressings daily.  -Continue  home health 4 times a week for additional dressing changes 1 time a week in clinic and his daughter to change on the weekend.  -Patient was warned that there is a possibility that his chronic Pseudomonas could cause or lead to sepsis due to heavy bacterial load and debridement to remove slough creating a portal for pseudomonal entry.      5.  Pain associated with wound    08/04/21:  -2% viscous lidocaine applied topically to wound bed for approximately 5 minutes prior to debridement  -Patient tolerated procedure today.        20 min spent  with patient, time spent counseling, coordinating care, reviewing records, discussing POC, educating patient regarding wound healing and progression.  This time was spent in excess to procedure time.     Please note that this note may have been created using voice recognition software. I have worked with technical experts from [employer] to optimize the interface.  I have made every reasonable attempt to correct obvious errors, but there may be errors of grammar and possibly content that I did not discover before finalizing the note.    N

## 2021-08-11 ENCOUNTER — APPOINTMENT (OUTPATIENT)
Dept: WOUND CARE | Facility: MEDICAL CENTER | Age: 66
End: 2021-08-11
Attending: NURSE PRACTITIONER
Payer: MEDICARE

## 2021-08-20 ENCOUNTER — TELEPHONE (OUTPATIENT)
Dept: CARDIOLOGY | Facility: MEDICAL CENTER | Age: 66
End: 2021-08-20

## 2021-08-20 NOTE — TELEPHONE ENCOUNTER
Spoke to patient in regards to records for NP appointment with Dr. Styles on 08/31/2021 at 04:00pm. Patient is a previous patient of Dr. Saucedo, all recent records in epic including blood work, cardiac testing, and EKG. Confirmed appt date, time and location.

## 2021-08-20 NOTE — WOUND TEAM
[facility]  Inpatient Services  Wound and Skin Care Follow Up    Admission Date: 4/16/2020     Last order of IP CONSULT TO WOUND CARE was found on 4/24/2020 from Hospital Encounter on 4/16/2020     HPI, PMH, SH: Reviewed    Unit where seen by Wound Team: [id number]     WOUND CONSULT/FOLLOW UP RELATED TO:  LLE Vac placement post surgical debridement     Self Report / Pain Level:  Pre-medicated with PO and IV medication. Minimal pain noted.       OBJECTIVE:  Compression dressing with abdominal pads and adaptic in place.    WOUND TYPE, LOCATION, CHARACTERISTICS (Pressure Injuries: location, stage, POA or date identified)      Negative Pressure Wound Therapy 05/05/20 Surgical (Active)   NPWT Pump Mode / Pressure Setting Continuous;125 mmHg 5/5/2020  9:30 AM   Dressing Type Large;Black Foam (Regular) 5/5/2020  9:30 AM   Number of Foam Pieces Used 2 5/5/2020  9:30 AM   Canister Changed Yes 5/5/2020  9:30 AM   NEXT Dressing Change/Treatment Date 05/07/20 5/5/2020  9:30 AM   WOUND NURSE ONLY - Time Spent with Patient (mins) 180 5/5/2020  9:30 AM           Wound 04/16/20 Venous Ulcer Ankle;Leg Medial;Anterior;Posterior Left -Left Medial/Posterior Ankle & lower leg (Active)   Wound Image      Site Assessment Pink;Red;Yellow    Periwound Assessment Pink;Red    Margins Attached edges    Closure Secondary intention    Drainage Amount Scant    Drainage Description Serosanguineous    Treatments Cleansed;Site care;Tape change    Wound Cleansing Approved Wound Cleanser    Periwound Protectant Benzoin;Drape;Paste Ring    Dressing Cleansing/Solutions Not Applicable    Dressing Options Wound Vac;Compression Wrap Two Layer    Dressing Changed New    Dressing Status Clean;Dry;Intact    Dressing Change/Treatment Frequency Tuesday, Thursday, Saturday, and As Needed    NEXT Dressing Change/Treatment Date 05/07/20    NEXT Weekly Photo (Inpatient Only) 05/06/20    Exposed Structures None            Wound 04/24/20 Incision Foot  Left (Active)   Site Assessment Pink;Red    Periwound Assessment Pink;Red    Margins Attached edges    Closure Secondary intention    Drainage Amount None    Drainage Description Brown;Other (Comment)    Treatments Cleansed;Site care    Wound Cleansing Approved Wound Cleanser    Periwound Protectant Not Applicable    Dressing Cleansing/Solutions Not Applicable    Dressing Changed New    Dressing Status Dry;Intact;Clean    Dressing Change/Treatment Frequency By Wound Team Only        Wound 05/05/20 Other (comment) Leg Lateral;Lower Left open incision (Active)   Wound Image      Site Assessment Bleeding;Red;Tunneling;Yellow    Periwound Assessment Satellite lesions;Non-blanchable erythema;Pink;Induration    Margins Attached edges;Undefined edges    Closure Secondary intention    Drainage Amount Large    Drainage Description Sanguineous    Treatments Cleansed;Direct pressure;Site care;Tape change    Wound Cleansing Approved Wound Cleanser    Periwound Protectant Paste Ring;Benzoin;Drape    Dressing Cleansing/Solutions Not Applicable    Dressing Options Wound Vac;Compression Wrap Two Layer    Dressing Changed New    Dressing Status Clean;Dry;Intact    Dressing Change/Treatment Frequency Tuesday, Thursday, Saturday, and As Needed    NEXT Dressing Change/Treatment Date 05/07/20    NEXT Weekly Photo (Inpatient Only) 05/12/20    Wound Length (cm) 23.5 cm    Wound Width (cm) 7.7 cm    Wound Surface Area (cm^2) 180.95 cm^2    Tunneling (cm) 6 cm    Tunneling Clock Position of Wound 11    Pulses Left;DP;PT;1+    Exposed Structures Muscle;Tendon             Vascular:    BHUPINDER:   No results found.      Lab Values:    Lab Results   Component Value Date/Time    WBC 8.3 05/05/2020 04:05 AM    RBC 2.16 (L) 05/05/2020 04:05 AM    HEMOGLOBIN 6.9 (L) 05/05/2020 04:05 AM    HEMATOCRIT 20.8 (L) 05/05/2020 04:05 AM    CREACTPROT 14.44 (H) 04/22/2020 12:49 AM    SEDRATEWES 119 (H) 05/04/2020 03:31 AM    HBA1C 5.7 (H) 01/23/2020 11:22 AM             Culture:   Obtained,   Culture Results show:  Recent Results (from the past 720 hour(s))   CULTURE WOUND W/ GRAM STAIN    Collection Time: 04/22/20 12:03 PM   Result Value Ref Range    Significant Indicator POS (POS)     Source WND     Site Left Leg     Culture Result - (A)     Gram Stain Result Moderate WBCs.  No organisms seen.       Culture Result (A)      Pseudomonas aeruginosa  Light growth  P.aeruginosa may develop resistance during prolonged therapy  with all antibiotics. Isolates that are initially susceptible  may become resistant within three to four days after  initiation of therapy. Testing of repeat isolates may be  warranted.      Culture Result (A)      Beta Hemolytic Streptococcus group A  Rare growth         Susceptibility    Pseudomonas aeruginosa - SHANON     Ceftazidime <=1 Sensitive mcg/mL     Ciprofloxacin >2 Resistant mcg/mL     Cefepime 4 Sensitive mcg/mL     Amikacin <=16 Sensitive mcg/mL     Gentamicin <=4 Sensitive mcg/mL     Tobramycin <=4 Sensitive mcg/mL     Meropenem <=1 Sensitive mcg/mL     Pip/Tazobactam 64 Sensitive mcg/mL         INTERVENTIONS BY WOUND TEAM:  Patient and chart reviewed. Patient had I&D 5/4 on LLE and compression dressing with adaptic applied. Verified with Polio prior to DCing and placing vac. Dressing removed, presenting venous ulcers along the anterior leg, medial and posterior ankle area. Some serosanguinous drainage present, no exposed structures, unable to obtain pictures and measurements of venous ulcers due to camera malfunction, will do on next vac change. Small incision with sutures on posterior ankle, intact, adaptic placed over. Lateral leg presents with very large surgical incision wound, measurements and pictures able to be obtained and in chart. Red, yellow, muscle visible, cauterization sites, very large amount of bloody drainage from proximal site at 1100, unable to stop with direct manual pressure, surgifoam placed over area to help stop  bleeding. Tunnel found at 1100, at bleeding site, going 6cm, surgifoam placed in tunnel by MINERVA TREVIZO. Benzoin to entire periwound of leg. 6 total paste rings applied around the entire periwound of the lateral surgical incision and the venous ulcers (avoiding the incision with sutures and adaptic in place), drape applied instead of paste ring to bridge lateral wound to venous ulcer wounds and drape over the tunneling site. Regular black foam (cut in spiral) applied to entire wound bed of lateral surgical incision wound, bridging to medial/posterior ankle wound and anterior leg wound, and bolstered over the tunnel site. Secured in place with drape. 2 total pieces of foam used. Trac pad applied over lateral wound towards proximal area with tubing going up towards knee. Vac machine turned on, suction obtained at 125mmHg continuous, no leaks present. 2-layer compression wrap applied to LLE, CMS check completed and intact, appropriate slot for tubing without applying pressure assured. Appropriate orders placed, wound team to follow up 5/6 to potentially switch lateral wound to veraflo vac. Staying in contact with MINERVA Rivera and MD John. Bedside RN Stacie updated.    Interdisciplinary consultation: Patient, Bedside RN (Stacie), MINERVA Rivera, Wound RN MD Dora Germain    EVALUATION: Patient admitted with LLE redness and swelling, venous ulcers present, developed an abcess and gas trapped underneath surface, multiple I&D's and Necrotic tissue developed. Had to be excised on the lateral leg down to the fascia. LLE now presents with Lateral surgical incision wound and venous ulcers to the anterior leg/medial/posterior ankle. Need new pictures and measurements of venous ulcers on next dressing change. Due to large amounts of bleeding at the proximal site of lateral wound, no veraflo applied at this time. Venous ulcers will benefit from NPWT to manage drainage, assist in debridement, and properly heal the wound beds. Will  continue regular NPWT on venous ulcer sites. Lateral incisional wound regular NPWT will help manage drainage and slow bleeding, will apply pressure by bolstering over tunnel site. Will benefit from veraflo NPWT to maintain a moist wound bed, manage drainage, encourage granulation, and keep the wound clean. Compression dressing to help with swelling/edema and blood flow, also will compress wound vac more and potentially slow bleeding. Wound team to go back to assess patient on 5/6 to potentially place Vf on lateral wound per LPS request. Bedside Rn udpated. Will continue to follow up.     Goals: Steady decrease in wound area and depth weekly.    NURSING PLAN OF CARE ORDERS (X):    Dressing changes: See Dressing Care orders: X  Skin care: See Skin Care orders: X  Rectal tube care: See Rectal Tube Care orders:   Other orders:      WOUND TEAM PLAN OF CARE:   Dressing changes by wound team:          Follow up 1-2 times weekly:               Follow up 3 times weekly:                NPWT change 3 times weekly:   X  Follow up as needed:       Other (explain):     Anticipated discharge plans:   LTACH:    X    SNF/Rehab:   X               Home Care:           Outpatient Wound Center:            Self Care:               PCP for Immediate Care

## 2021-08-31 ENCOUNTER — OFFICE VISIT (OUTPATIENT)
Dept: CARDIOLOGY | Facility: MEDICAL CENTER | Age: 66
End: 2021-08-31
Payer: MEDICARE

## 2021-08-31 VITALS
BODY MASS INDEX: 21.4 KG/M2 | OXYGEN SATURATION: 94 % | HEIGHT: 72 IN | SYSTOLIC BLOOD PRESSURE: 110 MMHG | RESPIRATION RATE: 14 BRPM | DIASTOLIC BLOOD PRESSURE: 68 MMHG | WEIGHT: 158 LBS | HEART RATE: 114 BPM

## 2021-08-31 DIAGNOSIS — F12.20 MARIJUANA DEPENDENCE (HCC): ICD-10-CM

## 2021-08-31 DIAGNOSIS — I25.10 ASCVD (ARTERIOSCLEROTIC CARDIOVASCULAR DISEASE): ICD-10-CM

## 2021-08-31 DIAGNOSIS — E78.5 DYSLIPIDEMIA: ICD-10-CM

## 2021-08-31 DIAGNOSIS — I10 ESSENTIAL HYPERTENSION: ICD-10-CM

## 2021-08-31 DIAGNOSIS — R07.89 OTHER CHEST PAIN: ICD-10-CM

## 2021-08-31 PROCEDURE — 99204 OFFICE O/P NEW MOD 45 MIN: CPT | Performed by: INTERNAL MEDICINE

## 2021-08-31 RX ORDER — TRIAMCINOLONE ACETONIDE 1 MG/G
CREAM TOPICAL
COMMUNITY
Start: 2021-08-13 | End: 2022-10-20

## 2021-08-31 RX ORDER — DAPTOMYCIN 50 MG/ML
INJECTION, POWDER, LYOPHILIZED, FOR SOLUTION INTRAVENOUS
COMMUNITY
Start: 2021-08-26 | End: 2022-09-22

## 2021-08-31 ASSESSMENT — ENCOUNTER SYMPTOMS
ORTHOPNEA: 0
WEAKNESS: 0
NEAR-SYNCOPE: 0
FEVER: 0
MYALGIAS: 0
EXCESSIVE DAYTIME SLEEPINESS: 0
BACK PAIN: 0
DOUBLE VISION: 0
BLOATING: 0
NAUSEA: 0
IRREGULAR HEARTBEAT: 0
PARESTHESIAS: 0
SORE THROAT: 0
DIAPHORESIS: 0
WHEEZING: 0
PND: 0
SLEEP DISTURBANCES DUE TO BREATHING: 0
NUMBNESS: 0
PALPITATIONS: 0
BLURRED VISION: 0
FALLS: 0
DYSPNEA ON EXERTION: 0
COUGH: 0
DIARRHEA: 0
DECREASED APPETITE: 0
NIGHT SWEATS: 0
FLANK PAIN: 0
SYNCOPE: 0
HEADACHES: 0
DIZZINESS: 0
VOMITING: 0
LOSS OF BALANCE: 0
LIGHT-HEADEDNESS: 0
CONSTIPATION: 0
SHORTNESS OF BREATH: 0

## 2021-08-31 ASSESSMENT — FIBROSIS 4 INDEX: FIB4 SCORE: 1.44

## 2021-08-31 NOTE — PROGRESS NOTES
Cardiology Initial Consultation Note    Date of note:    8/31/2021    Primary Care Provider: Marcela Ch M.D.  Referring Provider: Marcela Ch M.D.    Patient Name: Goran Chavez   YOB: 1955  MRN:              [id number]    Chief Complaint   Patient presents with   • Hypertension     F/V Dx: Essential hypertension   • Hyperlipidemia     F/V Dx: Other hyperlipidemia       History of Present Illness: Mr. Goran Chavez is a 66 y.o. 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  Patient is accompanied by his daughter.    Patient states that he has been dealing with chronic bilateral lower extremity wounds for the past several years.  Has been on antibiotics in the past with no improvement and the plan was for possible amputation.  Went to [facility] wound clinic for a second opinion and was started on daptomycin.    Is not on statin while on daptomycin for 6 weeks.  Will restart after.    Has occasional chest pain/pressure which is short lasting and does not bother him much.    In terms of physical activity, is limited in mobility due to chronic leg wounds.  Uses a cane to ambulate    Cardiovascular Risk Factors:  1. Smoking status: Former smoker, quit 2018  2. Type II Diabetes Mellitus: no   Lab Results   Component Value Date/Time    HBA1C 5.5 07/29/2021 03:21 PM    HBA1C 5.7 (H) 01/23/2020 11:22 AM     3. Hypertension: Yes  4. Dyslipidemia: Yes   Cholesterol,Tot   Date Value Ref Range Status   07/29/2021 109 100 - 199 mg/dL Final     LDL   Date Value Ref Range Status   07/29/2021 64 <100 mg/dL Final     HDL   Date Value Ref Range Status   07/29/2021 30 (A) >=40 mg/dL Final     Triglycerides   Date Value  Ref Range Status   07/29/2021 77 0 - 149 mg/dL Final     5. Family history of early Coronary Artery Disease in a first degree relative (Male less than 55 years of age; Female less than 65 years of age): Denies  6.  Obesity and/or Metabolic Syndrome: No  7. Sedentary lifestyle: Yes      Review of Systems   Constitutional: Negative for decreased appetite, diaphoresis, fever, malaise/fatigue and night sweats.   HENT: Negative for congestion and sore throat.    Eyes: Negative for blurred vision and double vision.   Cardiovascular: Positive for leg swelling. Negative for chest pain, cyanosis, dyspnea on exertion, irregular heartbeat, near-syncope, orthopnea, palpitations, paroxysmal nocturnal dyspnea and syncope.   Respiratory: Negative for cough, shortness of breath, sleep disturbances due to breathing and wheezing.    Endocrine: Negative for cold intolerance and heat intolerance.   Skin: Positive for itching.   Musculoskeletal: Negative for back pain, falls and myalgias.   Gastrointestinal: Negative for bloating, constipation, diarrhea, nausea and vomiting.   Genitourinary: Negative for dysuria and flank pain.   Neurological: Negative for excessive daytime sleepiness, dizziness, headaches, light-headedness, loss of balance, numbness, paresthesias and weakness.       Past Medical History:   Diagnosis Date   • Anxiety    • Charcot's joint of left foot, non-diabetic 3/21/2016   • Hepatitis C, chronic (HCC)    • Hypertension    • Kidney disease    • Migraine    • Polysubstance abuse (HCC) 3/8/2018   • Tobacco use 4/18/2016   • Ulcer of left lower extremity with necrosis of muscle (HCC) 3/21/2016   • Venous stasis ulcer (HCC) 2017    bilateral lower extremity         Past Surgical History:   Procedure Laterality Date   • IRRIGATION & DEBRIDEMENT ORTHO Left 5/4/2020    Procedure: IRRIGATION AND DEBRIDEMENT, WOUND - LEG;  Surgeon: Barrie Israel M.D.;  Location: SURGERY [facility];  Service: Orthopedics   • IRRIGATION  & DEBRIDEMENT ORTHO Left 4/27/2020    Procedure: IRRIGATION AND DEBRIDEMENT, WOUND - LEG;  Surgeon: Pasquale John M.D.;  Location: SURGERY [facility];  Service: Orthopedics   • IRRIGATION & DEBRIDEMENT ORTHO Left 4/24/2020    Procedure: IRRIGATION AND DEBRIDEMENT, WOUND-FOOT;  Surgeon: Barrie Israel M.D.;  Location: SURGERY [facility];  Service: Orthopedics   • IRRIGATION & DEBRIDEMENT ORTHO Left 4/22/2020    Procedure: IRRIGATION AND DEBRIDEMENT, WOUND - LEG;  Surgeon: Barrie Israel M.D.;  Location: SURGERY [facility];  Service: Orthopedics   • TIBIA ORIF Right 1997   • SHOULDER ORIF Left 1997   • HAND SURGERY Left     due to infection   • TN DRAIN SKIN ABSCESS SIMPLE Left     leg, near the knee, remote         Current Outpatient Medications   Medication Sig Dispense Refill   • triamcinolone acetonide (KENALOG) 0.1 % Cream APPLY CREAM EXTERNALLY TO AFFECTED AREA DAILY AS DIRECTED     • DAPTOmycin (CUBICIN) 500 MG Recon Soln      • aspirin EC (ECOTRIN) 81 MG Tablet Delayed Response Take 1 Tablet by mouth every day. 30 Tablet 0   • hydrOXYzine pamoate (VISTARIL) 50 MG Cap Take 1 capsule by mouth 3 times a day as needed for Itching (nausea). 20 capsule 1   • ferrous gluconate 324 (37.5 Fe) MG Tab TAKE 1 TABLET BY MOUTH IN THE MORNING WITH BREAKFAST 30 tablet 3   • sertraline (ZOLOFT) 100 MG Tab Take 1 tablet by mouth once daily 30 tablet 5   • amitriptyline (ELAVIL) 25 MG Tab TAKE 1 TABLET BY MOUTH AT BEDTIME AS NEEDED FOR SLEEP (INCREASED DOSE) 30 tablet 5   • meloxicam (MOBIC) 7.5 MG Tab TAKE 1 TABLET BY MOUTH ONCE DAILY AS NEEDED FOR MODERATE PAIN (Patient taking differently: Take 7.5 mg by mouth 1 time a day as needed (PAIN).) 30 tablet 3   • amLODIPine (NORVASC) 10 MG Tab Take 1 tablet by mouth once daily (Patient taking differently: Take 10 mg by mouth every day.) 90 tablet 3   • atorvastatin (LIPITOR) 40 MG Tab Take 1 tablet by mouth once daily (Patient taking differently: Take 40 mg by  mouth every day.) 30 Tab 11   • ascorbic acid (VITAMIN C) 500 MG tablet Take 1 tablet by mouth once daily (Patient taking differently: Take 500 mg by mouth every day.) 30 Tab 5   • ferrous gluconate (FERGON) 324 (38 Fe) MG Tab Take 1 Tab by mouth every morning with breakfast. 30 Tab 5   • lisinopril (PRINIVIL) 10 MG Tab Take 1 tablet by mouth every day. (Patient not taking: Reported on 2021) 30 tablet 0     No current facility-administered medications for this visit.         No Known Allergies      Family History   Problem Relation Age of Onset   • Lung Disease Mother 70         from COPD   • Hypertension Mother    • Other Father 82         from brain aneurysm after fall   • Cancer Neg Hx    • Heart Disease Neg Hx    • Stroke Neg Hx    • Diabetes Neg Hx          Social History     Socioeconomic History   • Marital status: Legally      Spouse name: Not on file   • Number of children: Not on file   • Years of education: Not on file   • Highest education level: Not on file   Occupational History   • Not on file   Tobacco Use   • Smoking status: Former Smoker     Packs/day: 0.00     Years: 48.00     Pack years: 0.00     Types: Cigarettes     Quit date: 2018     Years since quittin.7   • Smokeless tobacco: Never Used   • Tobacco comment: states he is using 7mg nicotine patch   Vaping Use   • Vaping Use: Never used   Substance and Sexual Activity   • Alcohol use: No     Alcohol/week: 7.2 oz     Types: 12 Cans of beer per week     Comment: history of alcoholism    • Drug use: Yes     Types: Marijuana, Inhaled, Methamphetamines     Comment: MJ every day, intermittent meth use   • Sexual activity: Not on file   Other Topics Concern   • Not on file   Social History Narrative   • Not on file     Social Determinants of Health     Financial Resource Strain:    • Difficulty of Paying Living Expenses:    Food Insecurity:    • Worried About Running Out of Food in the Last Year:    • Ran Out of Food in  the Last Year:    Transportation Needs:    • Lack of Transportation (Medical):    • Lack of Transportation (Non-Medical):    Physical Activity:    • Days of Exercise per Week:    • Minutes of Exercise per Session:    Stress:    • Feeling of Stress :    Social Connections:    • Frequency of Communication with Friends and Family:    • Frequency of Social Gatherings with Friends and Family:    • Attends [employer] Services:    • Active Member of Clubs or Organizations:    • Attends Club or Organization Meetings:    • Marital Status:    Intimate Partner Violence:    • Fear of Current or Ex-Partner:    • Emotionally Abused:    • Physically Abused:    • Sexually Abused:        Physical Exam:  Ambulatory Vitals  /68 (BP Location: Left arm, Patient Position: Sitting, BP Cuff Size: Adult)   Pulse (!) 114   Resp 14   Ht 1.829 m (6')   Wt 71.7 kg (158 lb)   SpO2 94%    Oxygen Therapy:  Pulse Oximetry: 94 %  BP Readings from Last 4 Encounters:   08/31/21 110/68   08/04/21 123/77   07/29/21 118/78   07/28/21 119/87       Weight/BMI: Body mass index is 21.43 kg/m².  Wt Readings from Last 4 Encounters:   08/31/21 71.7 kg (158 lb)   07/29/21 76.7 kg (169 lb)   07/14/21 79.4 kg (175 lb)   04/11/21 80.2 kg (176 lb 12.9 oz)         General: Well appearing and in no apparent distress  Eyes: nl conjunctiva, no icteric sclera  ENT: wearing a mask, normal external appearance of ears  Neck: no visible JVP,  no carotid bruits  Lungs: normal respiratory effort, CTAB  Heart: RRR, no murmurs, no rubs or gallops, mild edema bilateral lower extremities. No LV/RV heave on cardiac palpatation. 2+ bilateral radial pulses   Abdomen: soft, non tender, non distended, no masses, normal bowel sounds.  No HSM.  Extremities/MSK: no clubbing, no cyanosis  Neurological: No focal sensory deficits  Psychiatric: Appropriate affect, A/O x 3, intact judgement and insight  Skin: Warm extremities      Lab Data Review:  Lab Results   Component Value  Date/Time    CHOLSTRLTOT 109 07/29/2021 03:21 PM    LDL 64 07/29/2021 03:21 PM    HDL 30 (A) 07/29/2021 03:21 PM    TRIGLYCERIDE 77 07/29/2021 03:21 PM       Lab Results   Component Value Date/Time    SODIUM 139 07/29/2021 03:21 PM    POTASSIUM 3.6 07/29/2021 03:21 PM    CHLORIDE 106 07/29/2021 03:21 PM    CO2 20 07/29/2021 03:21 PM    GLUCOSE 106 (H) 07/29/2021 03:21 PM    BUN 20 07/29/2021 03:21 PM    CREATININE 1.14 07/29/2021 03:21 PM     Lab Results   Component Value Date/Time    ALKPHOSPHAT 105 (H) 07/29/2021 03:21 PM    ASTSGOT 38 07/29/2021 03:21 PM    ALTSGPT 23 07/29/2021 03:21 PM    TBILIRUBIN 0.5 07/29/2021 03:21 PM      Lab Results   Component Value Date/Time    WBC 9.3 07/29/2021 03:21 PM     Lab Results   Component Value Date/Time    HBA1C 5.5 07/29/2021 03:21 PM    HBA1C 5.7 (H) 01/23/2020 11:22 AM         Cardiac Imaging and Procedures Review:    EKG dated 7/29/2021: My personal interpretation is sinus tachycardia with heart rate 104 bpm    Echo dated 2019:   CONCLUSIONS  Normal left ventricular size, systolic function, and diastolic function.  Left ventricular ejection fraction is visually estimated to be 55%.  Mild concentric left ventricular hypertrophy.  Normal left atrial size.  Mild mitral regurgitation.  No aortic insufficiency.  Mild aortic sclerosis without stenosis.  No aortic insufficiency.  Right ventricular systolic pressure is estimated to be 39 mmHg.  Mild tricuspid regurgitation.  Normal aortic root for body surface area.      Radiology test Review:  CTA Aorta (4/12/2021):   IMPRESSION:     1.  Prominent bilateral common femoral artery calcified plaque with greater than 50% diameter stenosis.  2.  Otherwise patent bilateral lower extremities with patent three-vessel runoff.  3.  Bilateral leg edema and/or cellulitis.  4.  Trace bilateral pleural effusions.  5.  Mild ascites.  6.  Bilateral renal cortical scarring.  7.  Enlarged iliac and inguinal lymph nodes which are nonspecific.  This could be reactive however cannot exclude malignancy. Suggest follow-up.      Assessment & Plan     1. Other chest pain  NM-CARDIAC STRESS TEST    aspirin EC (ECOTRIN) 81 MG Tablet Delayed Response   2. ASCVD (arteriosclerotic cardiovascular disease)  NM-CARDIAC STRESS TEST    aspirin EC (ECOTRIN) 81 MG Tablet Delayed Response   3. Essential hypertension     4. Dyslipidemia  aspirin EC (ECOTRIN) 81 MG Tablet Delayed Response   5. Marijuana dependence (HCC)           Shared Medical Decision Making:  Obtain nuclear cardiac stress test given episodes of chest discomfort with evidence of atherosclerotic cardiovascular disease with CT aorta showing bilateral common femoral artery plaques with greater than 50% stenosis.     Recommend initiation of aspirin 81 mg daily.    Is currently on daptomycin and not taking atorvastatin due to drug drug interactions.  Patient will resume atorvastatin once daptomycin therapy has been completed.    Blood pressure well controlled.  Continue current antihypertensive medications.    Currently smokes marijuana on a regular basis.  Recommend quitting marijuana given chronic nonhealing wounds and atherosclerotic heart disease.      All of patient and his daughter's excellent questions were answered to the best of my knowledge and to their satisfaction.  It was a pleasure seeing Mr. Goran Chavez in my clinic today. Return in about 1 year (around 8/31/2022). Patient is aware to call the cardiology clinic with any questions or concerns.      Trevor Styles MD  [facility]  [facility], [address]  [address] [place] [id number]  Phone: [phone]  Fax: [phone]

## 2021-09-01 NOTE — PROGRESS NOTES
RX  Received: Today   Call patient  Isamar POON IvLEEANN Lee!     Sending out a quick message to let you know that this pt would like to get the aspirin (prescribed by PETROS today), [facility]     Let me know if you have any further questions,     Thank you!     ==================================    Rx sent.

## 2021-10-16 DIAGNOSIS — F41.9 ANXIETY: ICD-10-CM

## 2021-10-19 RX ORDER — SERTRALINE HYDROCHLORIDE 100 MG/1
TABLET, FILM COATED ORAL
Qty: 30 TABLET | Refills: 5 | Status: SHIPPED | OUTPATIENT
Start: 2021-10-19 | End: 2022-05-23

## 2021-11-16 ENCOUNTER — TELEPHONE (OUTPATIENT)
Dept: MEDICAL GROUP | Facility: MEDICAL CENTER | Age: 66
End: 2021-11-16

## 2021-11-16 NOTE — TELEPHONE ENCOUNTER
1. [facility]                      Call Back Number: [phone]      How would the patient prefer to be contacted with a response: Phone call OK to leave a detailed message    recieved VM from [facility] wanting to update [facility]HRUV Ch [facility], Pt to be overseen by skilled nursing as well as [facility] wound care, skilled nursing to assist with advanced directives as well, Birdie stated that there has been no change in Pt's medications. Was advised that if there is any changes or updates that Dr Ch would be contacted directly.    No further

## 2022-01-12 DIAGNOSIS — I83.029 VENOUS STASIS ULCER OF LEFT LOWER EXTREMITY (HCC): ICD-10-CM

## 2022-01-12 DIAGNOSIS — L97.929 VENOUS STASIS ULCER OF LEFT LOWER EXTREMITY (HCC): ICD-10-CM

## 2022-01-13 RX ORDER — MELOXICAM 7.5 MG/1
TABLET ORAL
Qty: 30 TABLET | Refills: 3 | Status: SHIPPED | OUTPATIENT
Start: 2022-01-13 | End: 2022-09-22 | Stop reason: SDUPTHER

## 2022-02-02 NOTE — TELEPHONE ENCOUNTER
Attempted to call patient to cancel appt for 02/03/2022 no answer on the mobile phone and the voice mail unable to leave a message 02/02/22 Pt was advised

## 2022-02-15 DIAGNOSIS — D64.9 NORMOCYTIC ANEMIA: ICD-10-CM

## 2022-02-17 RX ORDER — ASCORBIC ACID 500 MG
500 TABLET ORAL DAILY
Qty: 30 TABLET | Refills: 5 | Status: SHIPPED | OUTPATIENT
Start: 2022-02-17 | End: 2022-09-23 | Stop reason: SDUPTHER

## 2022-02-22 ENCOUNTER — HOSPITAL ENCOUNTER (OUTPATIENT)
Dept: RADIOLOGY | Facility: MEDICAL CENTER | Age: 67
End: 2022-02-22
Attending: INTERNAL MEDICINE
Payer: MEDICARE

## 2022-02-22 DIAGNOSIS — I25.10 ASCVD (ARTERIOSCLEROTIC CARDIOVASCULAR DISEASE): ICD-10-CM

## 2022-02-22 DIAGNOSIS — R07.89 OTHER CHEST PAIN: ICD-10-CM

## 2022-02-22 PROCEDURE — 78452 HT MUSCLE IMAGE SPECT MULT: CPT | Mod: 26 | Performed by: INTERNAL MEDICINE

## 2022-02-22 PROCEDURE — 700111 HCHG RX REV CODE 636 W/ 250 OVERRIDE (IP): Performed by: INTERNAL MEDICINE

## 2022-02-22 PROCEDURE — 93018 CV STRESS TEST I&R ONLY: CPT | Performed by: INTERNAL MEDICINE

## 2022-02-22 PROCEDURE — A9502 TC99M TETROFOSMIN: HCPCS

## 2022-02-22 RX ORDER — AMINOPHYLLINE 25 MG/ML
100 INJECTION, SOLUTION INTRAVENOUS
Status: DISCONTINUED | OUTPATIENT
Start: 2022-02-22 | End: 2022-02-23 | Stop reason: HOSPADM

## 2022-02-22 RX ORDER — REGADENOSON 0.08 MG/ML
0.4 INJECTION, SOLUTION INTRAVENOUS ONCE
Status: COMPLETED | OUTPATIENT
Start: 2022-02-22 | End: 2022-02-22

## 2022-02-22 RX ADMIN — REGADENOSON 0.4 MG: 0.08 INJECTION, SOLUTION INTRAVENOUS at 10:23

## 2022-02-22 NOTE — PROCEDURES
Nursing care plan includes knowledge deficit, potential for discomfort, potential for compromised cardiac output. POC includes teaching, comfort measures and reassurance, and access to code cart, cardiology stand by and availability of rapid response team. Pt verbalizes good understanding of benefits and risks of pharmacological cardiac stress test. Informed consent obtained. Baseline EKG shows SR with no significant changes or findings throughout exam. Lexiscan given, pt developed mild SOB. VS stable. To waiting room, caffeinated fluids given. Nursing goals met.

## 2022-02-23 ENCOUNTER — TELEPHONE (OUTPATIENT)
Dept: CARDIOLOGY | Facility: MEDICAL CENTER | Age: 67
End: 2022-02-23
Payer: MEDICARE

## 2022-02-24 NOTE — TELEPHONE ENCOUNTER
----- Message from Trevor Styles M.D. sent at 2/22/2022  4:20 PM PST -----  Please let the patient know about normal stress test results.  Thank you.

## 2022-02-24 NOTE — TELEPHONE ENCOUNTER
Result Notes     Trevor Styles M.D.   2/22/2022  4:20 PM PST Back to Top        Please let the patient know about normal stress test results.  Thank you.     ---------------------  Called pt [phone]  Relayed results to pt. Pt verbalized understanding and is appreciative of call.

## 2022-03-05 DIAGNOSIS — E78.49 OTHER HYPERLIPIDEMIA: ICD-10-CM

## 2022-03-08 RX ORDER — ATORVASTATIN CALCIUM 40 MG/1
TABLET, FILM COATED ORAL
Qty: 30 TABLET | Refills: 5 | Status: SHIPPED | OUTPATIENT
Start: 2022-03-08 | End: 2022-09-22 | Stop reason: SDUPTHER

## 2022-03-24 NOTE — PROGRESS NOTES
Attending Hospitalist today is Dr. Juan starting at 0700. Please call this physician for orders, updates and questions. Thank you.    CC:   Chief Complaint   Patient presents with   • Breathing Problem     noticed \"heavy\" breathing Sunday morning.  ? anxiety regarding breathing.  Had stomach flu 2 wks ago.             HPI:    ATTENDING/COLLABERATING PHYSICIAN DR. Augie Juárez  Vale is a 8 year old year old female who presents to the urgent care with mother for evaluation of shortness of breath.  Mother explains that patient seems to be doing have ear.  Patient states that there are times where she needs to take deeper breaths.  Mother feels that this is in intensified over the last few days.  Patient does not have a cough does nasal congestion mother thinks that patient may have allergies patient does take over-the-counter Claritin.  Patient has had some respiratory issues in the past specifically with acute viral illnesses where she has asthma like symptoms.  Mother is concerned that patient may actually have asthma.  Patient states that she will have this shortness of breath sometimes even rest.  Patient has not had any GI symptoms no fevers no other issues    Current Outpatient Medications   Medication Sig   • albuterol (VENTOLIN) (2.5 MG/3ML) 0.083% nebulizer solution 1 nebulizer 4 times daily for 3 days then every 4-6 hours as needed.   • loratadine (CLARITIN) 5 MG/5ML syrup Take 5 mLs by mouth daily as needed for Allergies (cough).   • prednisoLONE (PRELONE) 15 MG/5ML Syrup Take 10 mLs by mouth daily for 5 days.     No current facility-administered medications for this visit.       ALLERGIES:   Allergen Reactions   • Seasonal Other (See Comments)     Environmental Allergies/ Sneezing       History reviewed. No pertinent past medical history.    REVIEW OF SYSTEMS    See history of present illness otherwise 10 point review of systems is negative      PHYSICAL EXAM    Vital Signs:    Vitals:    03/24/22 1638   BP: (!) 107/56   Pulse: (!) 145   Resp: 24   Temp: 99 °F (37.2 °C)   TempSrc: Temporal   SpO2: 100%   Weight: 23.1 kg (50 lb 14.8 oz)      Constitutional:  No acute distress. Acting and behaving appropriately.   ENT:  TMs pearly gray. External auditory canals clear. Nares patent, no obvious rhinorrhea. Posterior oropharynx clear without exudates. Uvula midline. Oral mucosa pink moist intact without lesions.    Neck: Supple, nontender. No lymphadenopathy.    Respiratory:  Lungs clear to auscultation bilaterally equal rise and fall. Respirations nonlabored however on occasion patient seems to take a deeper breath that times  Cardiovascular:  S1, S2, regular rate and rhythm. No murmurs, rubs, or gallops.      IMAGING:  EXAM:  XR CHEST PA AND LATERAL     CLINICAL INDICATION:  feels SOB over the last 3 days.  No fever no cough     COMPARISON:  May 22, 2019     FINDINGS:  The lungs appear slightly hyperinflated. Mild peribronchial  cuffing and minimal perihilar haziness persists but appears minimally  decreased from prior.     And the cardiothymic silhouette and pulmonary vasculature appear intact. No  pleural fluid. No pneumothorax.          IMPRESSION:  Findings suggesting mild small airways disease, slightly  improved from prior.    ASSESSMENT & PLAN    1. SOB (shortness of breath)      Patient does have some mild inflammatory changes of the lungs.  Suspect that this could be a chronic issue although unsure. Patient may have reactive airway disease or asthma variant.  At this time will start patient on prednisolone over the next 5 days.  I highly advised that patient be followed up with her pediatrician in the next 7-10 days for re-evaluation and to further discuss her symptoms. If any new or worsening issues contact clinic or seek medical attention.  All questions and concerns addressed in clinic today mother understood instructions clearly and patient was discharged in stable condition.    Orders Placed This Encounter   • XR Chest PA and Lateral   • prednisoLONE (PRELONE) 15 MG/5ML Syrup   • DISCONTD: metoCLOPramide (Reglan) 10 MG tablet     See  Patient Instruction section  No follow-ups on file.

## 2022-04-11 NOTE — PROGRESS NOTES
[facility] Medicine Daily Progress Note    Date of Service  12/29/2019    Chief Complaint  64 y.o. male admitted 12/26/2019 with Leg pain / Blister     Hospital Course    Patient presented to the hospital with complaints of leg pain, blisters, worsening erythema.  Reports discoloration of the skin of the legs for the last 3 years.  Has previous known history of cellulitis of his lower extremities.      Interval Problem Update  Patient seen and evaluated on rounds  Discussed with nursing staff on rounds  Improving cellulitis   Wound team evaluating   Abx de escalated   Echo reviewed by me     Consultants/Specialty  None    Code Status  Full code    Disposition  Anticipate home once acute issues are resolved    Review of Systems  Review of Systems   Constitutional: Positive for malaise/fatigue. Negative for chills, diaphoresis, fever and weight loss.   HENT: Negative for hearing loss and tinnitus.    Eyes: Negative for blurred vision and double vision.   Respiratory: Negative for cough, hemoptysis, sputum production, shortness of breath and wheezing.    Cardiovascular: Positive for leg swelling. Negative for chest pain, palpitations and PND.   Gastrointestinal: Negative for abdominal pain, blood in stool, constipation, diarrhea, heartburn, melena, nausea and vomiting.   Genitourinary: Negative for dysuria, flank pain, frequency, hematuria and urgency.   Musculoskeletal: Negative for back pain, falls, joint pain, myalgias and neck pain.   Skin: Positive for rash. Negative for itching.        Leg cellulitis, bullae/blister formation   Neurological: Negative for dizziness, tingling, tremors, sensory change, speech change, focal weakness, seizures, loss of consciousness, weakness and headaches.   Psychiatric/Behavioral: Negative for depression, substance abuse and suicidal ideas.        Physical Exam  Temp:  [36.6 °C (97.8 °F)-37.2 °C (99 °F)] 36.8 °C (98.2 °F)  Pulse:  [60-66] 66  Resp:  [16-20] 16  BP: (122-145)/(79-93)  Good afternoon,   Patient is calling to see if provider can call in Rx for yeast infection.       Please advise,   Thank you.       Pharmacy confirmed- [id number] - [place], [place] - [facility]  AT [facility] [address] - [phone]  - [phone] FX    136/79  SpO2:  [90 %-94 %] 90 %    Physical Exam  HENT:      Head: Normocephalic.      Right Ear: External ear normal.      Left Ear: External ear normal.      Nose: Nose normal.      Mouth/Throat:      Mouth: Mucous membranes are moist.   Eyes:      General: No scleral icterus.     Conjunctiva/sclera: Conjunctivae normal.      Pupils: Pupils are equal, round, and reactive to light.   Neck:      Musculoskeletal: Normal range of motion and neck supple.   Cardiovascular:      Rate and Rhythm: Normal rate and regular rhythm.      Pulses: Normal pulses.      Heart sounds: Murmur present. No friction rub. No gallop.       Comments: Aortic area systolic murmur, apical systolic murmur  Pulmonary:      Effort: Pulmonary effort is normal. No respiratory distress.      Breath sounds: Normal breath sounds. No stridor. No wheezing or rhonchi.   Abdominal:      General: Abdomen is flat. Bowel sounds are normal. There is no distension.      Palpations: There is no mass.      Tenderness: There is no tenderness. There is no right CVA tenderness, left CVA tenderness, guarding or rebound.      Hernia: No hernia is present.   Musculoskeletal: Normal range of motion.      Right lower leg: Edema present.      Left lower leg: Edema present.   Skin:     General: Skin is warm and dry.      Capillary Refill: Capillary refill takes 2 to 3 seconds.      Coloration: Skin is not jaundiced.      Findings: Erythema and rash present.      Comments: Patient has bilateral lower extremity venous stasis dermatitis with bilateral lower extremity superimposed cellulitis, more profound in the left lower extremity.  Blister, bullous formation   Neurological:      General: No focal deficit present.      Mental Status: He is alert and oriented to person, place, and time. Mental status is at baseline.   Psychiatric:         Mood and Affect: Mood normal.         Behavior: Behavior normal.         Thought Content: Thought content normal.         Judgment:  Judgment normal.       Dressing to L side - improving bullae s/p minimal debridement / cleansing by wound team yesterday     Fluids    Intake/Output Summary (Last 24 hours) at 12/29/2019 1158  Last data filed at 12/29/2019 1100  Gross per 24 hour   Intake 1290 ml   Output 4600 ml   Net -3310 ml       Laboratory  Recent Labs     12/27/19  0243 12/28/19 0334 12/29/19 0049   WBC 6.8 5.2 5.6   RBC 3.86* 3.77* 3.89*   HEMOGLOBIN 11.9* 11.7* 12.0*   HEMATOCRIT 38.4* 38.6* 37.6*   MCV 99.5* 102.4* 96.7   MCH 30.8 31.0 30.8   MCHC 31.0* 30.3* 31.9*   RDW 54.0* 56.0* 51.1*   PLATELETCT 253 254 278   MPV 9.1 9.5 8.8*     Recent Labs     12/27/19 0243 12/28/19 0334 12/29/19 0049   SODIUM 141 138 137   POTASSIUM 4.3 4.9 4.6   CHLORIDE 111 108 104   CO2 23 25 27   GLUCOSE 97 76 77   BUN 31* 23* 17   CREATININE 0.91 1.03 0.92   CALCIUM 8.3* 8.5 8.5                   Imaging  EC-ECHOCARDIOGRAM COMPLETE W/O CONT   Final Result      US-BHUPINDER SINGLE LEVEL BILAT   Final Result      CT-EXTREMITY, LOWER WITH LEFT   Final Result      Moderate to severe distal leg and medial ankle bullous change likely is infected.      Diffuse subcutaneous fat stranding and skin thickening could indicate bland edema or cellulitis and phlegmonous change.      No soft tissue gas or bony destruction is seen to suggest deep infection      CT-EXTREMITY, LOWER WITH RIGHT   Final Result      Diffuse subcutaneous fat stranding and skin thickening could indicate bland edema or cellulitis and phlegmonous change.      No abscess, soft tissue gas or bony destruction is seen to suggest infection      Healed tibia and fibula fractures with antegrade intramedullary nail in place           Assessment/Plan  * Venous stasis dermatitis of both lower extremities- (present on admission)  Assessment & Plan  With superimposed cellulitis of bilateral lower extremities, more profound in the left lower extremity  Left lower extremity with bullous formation, blisters secondary  to underlying infection  Arterial evaluation negative    Wound cultures obtained from December 26, 2019 are growing polymicrobial organisms including Serratia, Pseudomonas and Streptococcus.  Serratia is sensitive to Bactrim, Pseudomonas and sensitive to Cipro.    I have personally reviewed the culture data    At this time I am de-escalating patient's IV vancomycin, IV Unasyn to IV Zosyn    Over the next 24 to 48 hours, we will further de-escalate to oral ciprofloxacin/Bactrim    We would aim for a total of 14 days of therapy from today, 12/29/19    Limb preservation service, wound team evaluations to continue     Pain control with tramadol    Interval ESR / CRP tomorrow     Other hyperlipidemia- (present on admission)  Assessment & Plan  Continue atorvastatin     Macrocytic anemia- (present on admission)  Assessment & Plan  Likely anemia of chronic disease, component of minimal iron deficiency anemia  B12 lower limit of normal  Vitamin C, multivitamin, B12, iron supplementation initiated  Monitor Hb / Restrictive transfusion strategy    Essential hypertension- (present on admission)  Assessment & Plan  Continue propranolol       VTE prophylaxis: SC Heparin

## 2022-04-12 ENCOUNTER — TELEPHONE (OUTPATIENT)
Dept: MEDICAL GROUP | Facility: MEDICAL CENTER | Age: 67
End: 2022-04-12
Payer: MEDICARE

## 2022-04-12 NOTE — TELEPHONE ENCOUNTER
"1. Caller Name: unknown                        Call Back Number: unknown      How would the patient prefer to be contacted with a response: Phone call OK to leave a detailed message    reeceived LUZ from [facility].no name given. no phone number given. Rn stated on voice mail that Pt BP was elevated to 160/105 pulse at 106, temp 99.6. Pt was reported to be talkitive however was jumping from subject to subject and \"seemed\" agitated. no contact info was given howeer it was stated that if we have nyt inormation to give the RN would appreciate a call.   "

## 2022-05-21 DIAGNOSIS — F41.9 ANXIETY: ICD-10-CM

## 2022-05-23 RX ORDER — SERTRALINE HYDROCHLORIDE 100 MG/1
TABLET, FILM COATED ORAL
Qty: 30 TABLET | Refills: 2 | Status: SHIPPED | OUTPATIENT
Start: 2022-05-23 | End: 2022-09-22 | Stop reason: SDUPTHER

## 2022-05-26 DIAGNOSIS — G47.00 INSOMNIA, UNSPECIFIED TYPE: ICD-10-CM

## 2022-05-26 RX ORDER — AMITRIPTYLINE HYDROCHLORIDE 25 MG/1
TABLET, FILM COATED ORAL
Qty: 30 TABLET | Refills: 2 | Status: SHIPPED | OUTPATIENT
Start: 2022-05-26 | End: 2022-09-22 | Stop reason: SDUPTHER

## 2022-06-02 DIAGNOSIS — I10 ESSENTIAL HYPERTENSION: ICD-10-CM

## 2022-06-02 RX ORDER — AMLODIPINE BESYLATE 10 MG/1
10 TABLET ORAL DAILY
Qty: 90 TABLET | Refills: 0 | Status: SHIPPED | OUTPATIENT
Start: 2022-06-02 | End: 2022-09-22 | Stop reason: SDUPTHER

## 2022-06-02 NOTE — TELEPHONE ENCOUNTER
Received request via: Pharmacy    Was the patient seen in the last year in this department? Yes    Does the patient have an active prescription (recently filled or refills available) for medication(s) requested? No     Last visit 7/29/21

## 2022-07-25 NOTE — PROGRESS NOTES
Infectious Disease Progress Note    Author: Karen Hemphill M.D. Date & Time of service: 2018  9:31 AM    Chief Complaint:  Infected left leg ulcer    Interval History:  63 y.o. male admitted 2018 with non-healing leg ulcer and found to have polymicrobial wound   AF WBC 5.6 tolerating abx well-VAC placed   AF tolerating abx well-no complaints   AF WBC 5.9 no new complaints   afebrile WBC 6.8 patient states has been VAC change was performed this morning, he denies any leg pain, tolerating antibiotics without any issues, had 3 episodes of soft stools   afebrile WBC 6 patient reports no issues overnight, he also had no episodes of diarrhea   afebrile WBC 6.6 patient is resting comfortably and denies any leg pain, he was very happy with his [place] dinner, he had 2 watery stools yesterday but denies any abdominal pain  Labs Reviewed, Medications Reviewed, Radiology Reviewed and Wound Reviewed.    Review of Systems:  Review of Systems   Constitutional: Negative for chills and fever.   Cardiovascular: Negative for chest pain.   Gastrointestinal: Positive for diarrhea. Negative for abdominal pain, nausea and vomiting.   Skin: Negative for rash.   All other systems reviewed and are negative.      Hemodynamics:  Temp (24hrs), Av.7 °C (98.1 °F), Min:36.6 °C (97.9 °F), Max:36.9 °C (98.5 °F)  Temperature: 36.7 °C (98 °F)  Pulse  Av.3  Min: 46  Max: 95  Blood Pressure: 140/71       Physical Exam:  Physical Exam   Constitutional: He is oriented to person, place, and time. He appears well-developed. No distress.   disheveled   HENT:   Head: Normocephalic and atraumatic.   Eyes: Pupils are equal, round, and reactive to light. EOM are normal. No scleral icterus.   Neck: Neck supple.   Cardiovascular: Normal rate.    No murmur heard.  Pulmonary/Chest: Breath sounds normal. No respiratory distress. He has no wheezes.   Abdominal: Soft. There is no rebound and no guarding.    Musculoskeletal: He exhibits no edema.   VAC LLE   Neurological: He is alert and oriented to person, place, and time.   Skin: He is not diaphoretic.   tattoos   Nursing note and vitals reviewed.      Meds:    Current Facility-Administered Medications:   •  linezolid  •  amLODIPine  •  hydrALAZINE  •  oxyCODONE immediate-release **OR** oxyCODONE immediate-release  •  piperacillin-tazobactam **AND** [DISCONTINUED] MD Alert...Vancomycin per Pharmacy  •  heparin  •  Respiratory Care per Protocol  •  NS  •  acetaminophen  •  ondansetron  •  ondansetron  •  promethazine  •  promethazine  •  prochlorperazine  •  nicotine **AND** Nicotine Replacement Patient Education Materials **AND** nicotine polacrilex  •  propranolol    Labs:  Recent Labs      12/24/18 0026  12/25/18 0009  12/26/18   0012   WBC  6.8  6.0  6.6   RBC  4.36*  4.40*  4.34*   HEMOGLOBIN  13.6*  13.7*  13.6*   HEMATOCRIT  42.5  41.5*  42.1   MCV  97.5  94.3  97.0   MCH  31.2  31.1  31.3   RDW  57.1*  56.0*  58.8*   PLATELETCT  222  227  217   MPV  9.3  9.0  9.1   NEUTSPOLYS  63.60  56.70  59.80   LYMPHOCYTES  22.20  29.80  25.70   MONOCYTES  8.30  7.70  7.90   EOSINOPHILS  4.90  4.70  5.30   BASOPHILS  0.90  0.80  1.10     Recent Labs      12/24/18   0025  12/25/18 0009  12/26/18   0012   SODIUM  136  138  135   POTASSIUM  4.4  4.7  4.7   CHLORIDE  109  109  107   CO2  20  23  23   GLUCOSE  95  90  89   BUN  27*  28*  27*     Recent Labs      12/24/18   0025  12/25/18   0009  12/26/18   0012   CREATININE  1.24  1.20  1.35       Imaging:  Ct-chest (thorax) With    Result Date: 12/20/2018 12/20/2018 7:01 PM HISTORY/REASON FOR EXAM: Smoker. Evaluate for malignancy. 30 pack-year smoking history. Spiculated nodule on prior plain film TECHNIQUE/EXAM DESCRIPTION: CT scan of the chest with contrast. Helical images were obtained from the lung apices through the adrenal glands following the bolus administration of  70 mL of Omnipaque 350 nonionic contrast.  Sagittal and coronal reconstructions were performed. Low dose optimization technique was utilized for this CT exam including automated exposure control and adjustment of the mA and/or kV according to patient size. COMPARISON:  Plain film of the chest 12/5/2018. FINDINGS: Atherosclerotic plaque is seen in the aorta. Ascending aorta measures 4 cm in diameter. The heart is enlarged. No pericardial or pleural effusion is identified. No filling defects are seen within the large central pulmonary arteries. There are small mediastinal lymph nodes. There is no hilar lymphadenopathy. There are multiple small axillary lymph nodes bilaterally. There is mild left apical scarring. There is an ill-defined opacity at the in the right upper lobe with adjacent linear opacity. The nodular component measures 1 x 0.95 cm. There is bibasilar atelectasis. No pneumothorax is identified. Central airways are patent. Limited views were obtained of the upper abdomen. There is renal cortical scarring involving the kidneys bilaterally. Mild degenerative changes are seen in the spine. There are postsurgical and old posttraumatic changes of the proximal left humerus.     Ill-defined right upper lobe nodular opacity with surrounding linear opacities. This could be related to scarring or malignancy. Further evaluation with PET/CT is recommended. Bibasilar atelectasis. Cardiomegaly. Bilateral renal cortical scarring. Dilated ascending aorta measuring 4 cm.    Dx-chest-2 Views    Result Date: 12/5/2018 12/5/2018 9:09 AM HISTORY/REASON FOR EXAM:  Abnormal ultrasound.  Smoking history.  Hepatitis C and anemia. TECHNIQUE/EXAM DESCRIPTION AND NUMBER OF VIEWS: Two views of the chest. COMPARISON:  None available. FINDINGS: Cardiomediastinal contour is within normal limits. Nodular densities project over both lower lungs, likely nipple shadows. Small apparent spiculated nodular density projects over the RIGHT lung apex measuring approximately 9 mm.  Not  "demonstrated on lateral view. No pleural fluid collection or pneumothorax. No major bony abnormality is seen.     1.  No pneumonia or pneumothorax. 2.  Possible spiculated nodule at the RIGHT lung apex measuring 9 mm.  Recommend further evaluation with CT chest. 3.  Probable bibasilar nipple shadows.    Dx-foot-complete 3+ Left    Result Date: 12/19/2018 12/19/2018 12:52 AM HISTORY/REASON FOR EXAM:  Left foot pain without trauma TECHNIQUE/EXAM DESCRIPTION:  AP, lateral, and oblique views of the LEFT foot. COMPARISON:  None. FINDINGS: No acute fracture is seen. There is severe pes planus deformity identified with bony remodeling of the midfoot bony structures.     1.  No acute traumatic bony injury. 2.  Severe pes planus deformity with remodeling of the midfoot bony structures. Appearance suggests Charcot joints.    Dx-tibia And Fibula Left    Result Date: 12/19/2018 12/19/2018 12:52 AM HISTORY/REASON FOR EXAM:  Atraumatic leg pain. TECHNIQUE/EXAM DESCRIPTION:  AP and lateral views of the LEFT tibia and fibula. COMPARISON:  None. FINDINGS: There is no visualized acute fracture. There is severe pes planus deformity identified with extensive degenerative changes and bony remodeling of the midfoot.     1.  No acute traumatic bony injury. 2.  Severe pes planus deformity with extensive degenerative changes and bony remodeling of the midfoot.      Micro:  Results     Procedure Component Value Units Date/Time    BLOOD CULTURE x2 [[id number]] Collected:  12/19/18 0031    Order Status:  Completed Specimen:  Blood from Peripheral Updated:  12/24/18 0300     Significant Indicator NEG     Source BLD     Site PERIPHERAL     Blood Culture No growth after 5 days of incubation.    Narrative:       Per Hospital Policy: Only change Specimen Src: to \"Line\" if  specified by physician order.    BLOOD CULTURE x2 [[id number]] Collected:  12/19/18 0006    Order Status:  Completed Specimen:  Blood from Peripheral Updated:  12/24/18 0300     " "Significant Indicator NEG     Source BLD     Site PERIPHERAL     Blood Culture No growth after 5 days of incubation.    Narrative:       Per Hospital Policy: Only change Specimen Src: to \"Line\" if  specified by physician order.      Culture & Susceptibility     ENTEROCOCCUS FAECALIS     Antibiotic Sensitivity Microscan Unit Status   Ampicillin Sensitive <=2 mcg/mL Final   Daptomycin Sensitive 2 mcg/mL Final   Gent Synergy Resistant >500 mcg/mL Final   Penicillin Sensitive 2 mcg/mL Final   Strep Synergy Sensitive <=1000 mcg/mL Final   Vancomycin Sensitive 2 mcg/mL Final         KLEBSIELLA OXYTOCA     Antibiotic Sensitivity Microscan Unit Status   Ampicillin Intermediate 16 mcg/mL Final   Cefepime Sensitive <=8 mcg/mL Final   Cefotaxime Sensitive <=2 mcg/mL Final   Cefotetan Sensitive <=16 mcg/mL Final   Ceftazidime Sensitive <=1 mcg/mL Final   Ceftriaxone Sensitive <=8 mcg/mL Final   Cefuroxime Sensitive <=4 mcg/mL Final   Ciprofloxacin Sensitive <=1 mcg/mL Final   Ertapenem Sensitive <=1 mcg/mL Final   Gentamicin Sensitive <=4 mcg/mL Final   Pip/Tazobactam Sensitive <=16 mcg/mL Final   Tigecycline Sensitive <=2 mcg/mL Final   Tobramycin Sensitive <=4 mcg/mL Final   Trimeth/Sulfa Sensitive <=2/38 mcg/mL Final         METHICILLIN RESISTANT STAPHYLOCOCCUS AUREUS     Antibiotic Sensitivity Microscan Unit Status   Ampicillin/sulbactam Resistant 16/8 mcg/mL Final   Clindamycin Sensitive <=0.5 mcg/mL Final   Daptomycin Sensitive <=0.5 mcg/mL Final   Erythromycin Resistant >4 mcg/mL Final   Moxifloxacin Sensitive 1 mcg/mL Final   Oxacillin Resistant >2 mcg/mL Final   Penicillin Resistant >8 mcg/mL Final   Tetracycline Sensitive <=4 mcg/mL Final   Trimeth/Sulfa Sensitive <=0.5/9.5 mcg/mL Final   Vancomycin Sensitive 1 mcg/mL Final         PSEUDOMONAS AERUGINOSA     Antibiotic Sensitivity Microscan Unit Status   Amikacin Sensitive <=16 mcg/mL Final   Cefepime Sensitive <=8 mcg/mL Final   Ceftazidime Sensitive 4 mcg/mL Final "   Ciprofloxacin Sensitive <=1 mcg/mL Final   Gentamicin Sensitive <=4 mcg/mL Final   Imipenem Sensitive <=1 mcg/mL Final   Meropenem Sensitive <=1 mcg/mL Final   Pip/Tazobactam Sensitive <=16 mcg/mL Final   Tobramycin Sensitive <=4 mcg/mL Final                   Assessment:  Active Hospital Problems    Diagnosis   • Infected ulcer of skin (HCC) [L98.499, L08.9]   • Chronic hepatitis C without hepatic coma (HCC) [B18.2]   • Stage 3 chronic kidney disease (HCC) [N18.3]       Plan:  Infected ulcer of left leg   Infected venous stasis ulcer  Afebrile  No leukocytosis  Wound culture grows  Pseudomonas, MRSA, E faecalis and Klebsiella  C ontinue IV Zosyn and zyvox for now  Wound vac placed  LPS consulting.  -can change to PO quinolone at discharge with Zyvox  Stop date 1/2/2019  Plan for wound VAC change today    Diarrhea, persistent  DC'd stool softeners on 12/24  If persists today, check C. difficile PCR    Chronic hepatitis C without hepatic coma   In process of workup from GI Dr [facility] for treatment as outpatient    Lung nodule  Concern for underlying malignancy.  Biopsy if feasible  PET scan planned      Chronic kidney disease   Maintaining hydration  Avoiding nephrotoxics: NSAIDs etc    Dispo: TBD   Splint

## 2022-09-08 ENCOUNTER — TELEPHONE (OUTPATIENT)
Dept: SCHEDULING | Facility: IMAGING CENTER | Age: 67
End: 2022-09-08

## 2022-09-14 ENCOUNTER — TELEPHONE (OUTPATIENT)
Dept: HEALTH INFORMATION MANAGEMENT | Facility: OTHER | Age: 67
End: 2022-09-14
Payer: MEDICARE

## 2022-09-22 ENCOUNTER — OFFICE VISIT (OUTPATIENT)
Dept: MEDICAL GROUP | Facility: MEDICAL CENTER | Age: 67
End: 2022-09-22
Attending: INTERNAL MEDICINE
Payer: MEDICARE

## 2022-09-22 VITALS
OXYGEN SATURATION: 98 % | HEIGHT: 72 IN | TEMPERATURE: 98 F | BODY MASS INDEX: 23.84 KG/M2 | RESPIRATION RATE: 16 BRPM | DIASTOLIC BLOOD PRESSURE: 82 MMHG | SYSTOLIC BLOOD PRESSURE: 122 MMHG | WEIGHT: 176 LBS | HEART RATE: 68 BPM

## 2022-09-22 DIAGNOSIS — M75.81 RIGHT ROTATOR CUFF TENDONITIS: ICD-10-CM

## 2022-09-22 DIAGNOSIS — E78.49 OTHER HYPERLIPIDEMIA: ICD-10-CM

## 2022-09-22 DIAGNOSIS — D64.9 NORMOCYTIC ANEMIA: ICD-10-CM

## 2022-09-22 DIAGNOSIS — R73.03 PREDIABETES: ICD-10-CM

## 2022-09-22 DIAGNOSIS — Z23 NEED FOR VACCINATION: ICD-10-CM

## 2022-09-22 DIAGNOSIS — I10 ESSENTIAL HYPERTENSION: ICD-10-CM

## 2022-09-22 DIAGNOSIS — F41.9 ANXIETY: ICD-10-CM

## 2022-09-22 DIAGNOSIS — G47.00 INSOMNIA, UNSPECIFIED TYPE: ICD-10-CM

## 2022-09-22 DIAGNOSIS — K42.9 UMBILICAL HERNIA WITHOUT OBSTRUCTION AND WITHOUT GANGRENE: ICD-10-CM

## 2022-09-22 DIAGNOSIS — L97.909 CHRONIC SKIN ULCER OF LOWER LEG (HCC): ICD-10-CM

## 2022-09-22 DIAGNOSIS — F15.11 METHAMPHETAMINE ABUSE IN REMISSION (HCC): ICD-10-CM

## 2022-09-22 PROBLEM — L03.90 CELLULITIS: Status: RESOLVED | Noted: 2021-04-11 | Resolved: 2022-09-22

## 2022-09-22 PROBLEM — N18.30 CKD (CHRONIC KIDNEY DISEASE) STAGE 3, GFR 30-59 ML/MIN: Status: RESOLVED | Noted: 2018-03-07 | Resolved: 2022-09-22

## 2022-09-22 PROBLEM — Z87.898 HISTORY OF PREDIABETES: Status: RESOLVED | Noted: 2020-01-08 | Resolved: 2022-09-22

## 2022-09-22 PROCEDURE — 99214 OFFICE O/P EST MOD 30 MIN: CPT | Mod: 25 | Performed by: INTERNAL MEDICINE

## 2022-09-22 PROCEDURE — 20610 DRAIN/INJ JOINT/BURSA W/O US: CPT | Mod: RT | Performed by: INTERNAL MEDICINE

## 2022-09-22 PROCEDURE — 700111 HCHG RX REV CODE 636 W/ 250 OVERRIDE (IP): Performed by: INTERNAL MEDICINE

## 2022-09-22 PROCEDURE — 700101 HCHG RX REV CODE 250: Performed by: INTERNAL MEDICINE

## 2022-09-22 PROCEDURE — 99213 OFFICE O/P EST LOW 20 MIN: CPT | Performed by: INTERNAL MEDICINE

## 2022-09-22 PROCEDURE — 90471 IMMUNIZATION ADMIN: CPT

## 2022-09-22 RX ORDER — AMLODIPINE BESYLATE 10 MG/1
10 TABLET ORAL DAILY
Qty: 90 TABLET | Refills: 0 | Status: SHIPPED | OUTPATIENT
Start: 2022-09-22 | End: 2023-02-28

## 2022-09-22 RX ORDER — MELOXICAM 7.5 MG/1
TABLET ORAL
Qty: 30 TABLET | Refills: 3 | Status: SHIPPED | OUTPATIENT
Start: 2022-09-22 | End: 2023-04-13

## 2022-09-22 RX ORDER — AMITRIPTYLINE HYDROCHLORIDE 25 MG/1
TABLET, FILM COATED ORAL
Qty: 30 TABLET | Refills: 5 | Status: SHIPPED | OUTPATIENT
Start: 2022-09-22 | End: 2023-05-26

## 2022-09-22 RX ORDER — TRIAMCINOLONE ACETONIDE 40 MG/ML
40 INJECTION, SUSPENSION INTRA-ARTICULAR; INTRAMUSCULAR ONCE
Status: COMPLETED | OUTPATIENT
Start: 2022-09-22 | End: 2022-09-22

## 2022-09-22 RX ORDER — KETOCONAZOLE 20 MG/G
CREAM TOPICAL
COMMUNITY
Start: 2022-09-19

## 2022-09-22 RX ORDER — GABAPENTIN 300 MG/1
300 CAPSULE ORAL 3 TIMES DAILY
COMMUNITY
Start: 2022-09-09

## 2022-09-22 RX ORDER — SERTRALINE HYDROCHLORIDE 100 MG/1
100 TABLET, FILM COATED ORAL DAILY
Qty: 30 TABLET | Refills: 5 | Status: SHIPPED | OUTPATIENT
Start: 2022-09-22 | End: 2023-06-27

## 2022-09-22 RX ORDER — ATORVASTATIN CALCIUM 40 MG/1
40 TABLET, FILM COATED ORAL DAILY
Qty: 30 TABLET | Refills: 11 | Status: SHIPPED | OUTPATIENT
Start: 2022-09-22 | End: 2023-03-31 | Stop reason: SDUPTHER

## 2022-09-22 RX ORDER — LIDOCAINE HYDROCHLORIDE 20 MG/ML
5 INJECTION, SOLUTION EPIDURAL; INFILTRATION; INTRACAUDAL; PERINEURAL ONCE
Status: COMPLETED | OUTPATIENT
Start: 2022-09-22 | End: 2022-09-22

## 2022-09-22 RX ADMIN — TRIAMCINOLONE ACETONIDE 40 MG: 40 INJECTION, SUSPENSION INTRA-ARTICULAR; INTRAMUSCULAR at 19:14

## 2022-09-22 RX ADMIN — LIDOCAINE HYDROCHLORIDE 5 ML: 20 INJECTION, SOLUTION EPIDURAL; INFILTRATION; INTRACAUDAL; PERINEURAL at 19:13

## 2022-09-22 ASSESSMENT — PAIN SCALES - GENERAL: PAINLEVEL: 4=SLIGHT-MODERATE PAIN

## 2022-09-22 ASSESSMENT — FIBROSIS 4 INDEX: FIB4 SCORE: 1.46

## 2022-09-22 ASSESSMENT — PATIENT HEALTH QUESTIONNAIRE - PHQ9: CLINICAL INTERPRETATION OF PHQ2 SCORE: 0

## 2022-09-22 NOTE — PROGRESS NOTES
Subjective:   Goran Chavez is a 67 y.o. male here today for further injection, umbilical hernia, med refills, chronic problems below    Umbilical hernia  He noticed an umbilical hernia about 6 months ago.  He states that it is sometimes painful.  He is not sure how it began.  He is interested in having it surgically treated.     Right rotator cuff tendonitis  He states that his right shoulder has started to bother him again similar to in the past and we have done subacromial bursa injections for him which have alleviated his pain.  He reports his range of motion is starting to decrease and he is having trouble lifting items with his right hand due to the pain.  He is interested in repeating an injection today.    Methamphetamine abuse in remission ([facility])  Currently denies any active drug use.    Chronic skin ulcer of lower leg ([facility])  According to patient's daughter, he was recently hospitalized at a facility called [facility] for 21 days where they did extensive wound care and antibiotic therapy.  She states that his right leg has completely healed up and his left leg ulcers are looking much better.  He is still receiving regular wound care.    Essential hypertension  He continues on amlodipine 10 mg daily.  Reports good blood pressure control on this regimen.    Insomnia  He continues on amitriptyline 25 mg nightly and states that this helps a lot with his sleep.    Anxiety  He continues on sertraline 100 mg daily and reports good control of his symptoms on this regimen.    Other hyperlipidemia  Currently taking atorvastatin 40 mg daily.  He is due for updated labs.    Normocytic anemia  Last labs were checked about a year ago.  He continues on supplemental iron and vitamin C daily.  Denies melena or hematochezia.       Current medicines (including changes today)  Current Outpatient Medications   Medication Sig Dispense Refill    atorvastatin (LIPITOR) 40 MG Tab Take 1 Tablet by mouth every day. 30 Tablet 11     amLODIPine (NORVASC) 10 MG Tab Take 1 Tablet by mouth every day. 90 Tablet 0    sertraline (ZOLOFT) 100 MG Tab Take 1 Tablet by mouth every day. 30 Tablet 5    amitriptyline (ELAVIL) 25 MG Tab TAKE 1 TABLET BY MOUTH AT BEDTIME AS NEEDED FOR SLEEP (INCREASED DOSE) 30 Tablet 5    meloxicam (MOBIC) 7.5 MG Tab TAKE 1 TABLET BY MOUTH ONCE DAILY AS NEEDED FOR MODERATE PAIN 30 Tablet 3    ascorbic acid (VITAMIN C) 500 MG tablet Take 1 Tablet by mouth every day. 30 Tablet 5    triamcinolone acetonide (KENALOG) 0.1 % Cream APPLY CREAM EXTERNALLY TO AFFECTED AREA DAILY AS DIRECTED      aspirin EC (ECOTRIN) 81 MG Tablet Delayed Response Take 1 Tablet by mouth every day. 90 Tablet 3    ferrous gluconate (FERGON) 324 (38 Fe) MG Tab Take 1 Tab by mouth every morning with breakfast. 30 Tab 5     Current Facility-Administered Medications   Medication Dose Route Frequency Provider Last Rate Last Admin    triamcinolone acetonide (KENALOG-40) injection 40 mg  40 mg Intra-articular Once Marcela Ch M.D.        lidocaine PF (XYLOCAINE-MPF) 2 % injection PF 5 mL  5 mL Injection Once Marcela Ch M.D.         He  has a past medical history of Anxiety, Charcot's joint of left foot, non-diabetic (3/21/2016), Hepatitis C, chronic ([facility]), Hypertension, Kidney disease, Migraine, Polysubstance abuse ([facility]) (3/8/2018), Tobacco use (4/18/2016), Ulcer of left lower extremity with necrosis of muscle ([facility]) (3/21/2016), and Venous stasis ulcer ([facility]) (2017).         Objective:     Vitals:    09/22/22 1253   BP: 122/82   Pulse: 68   Resp: 16   Temp: 36.7 °C (98 °F)   SpO2: 98%     Body mass index is 23.87 kg/m².   Physical Exam:  Constitutional: Alert, no distress.  Skin: Warm, dry, good turgor, no rashes in visible areas, bilateral lower extremities are wrapped with dressings.  Eye: Equal, round and reactive, conjunctiva clear, lids normal.  ENMT: Lips without lesions, poor dentition, oropharynx clear, TM's clear bilaterally  Neck:  Trachea midline, no masses, no thyromegaly. No cervical or supraclavicular lymphadenopathy  Respiratory: Unlabored respiratory effort, lungs clear to auscultation, no wheezes, no ronchi.  Cardiovascular: Regular rate and rhythm, no murmurs appreciated, no significant lower extremity edema  Abdomen: Soft, reducible umbilical hernia present  Psych: Alert and oriented x3, normal affect and mood.    PROCEDURE NOTE:     Discussed risks and benefits of procedure with patient.  Patient verbally agreed to procedure. The RIGHT posterior shoulder was prepped with alcohol solution. Using a 23 guage needle the glenhumoral joint was injected with 1 cc 2% xylocaine and 1 cc of kenalog 40 mg under the posterior aspect of the acromion. The area was cleansed and dressed with a band aid.      Assessment and Plan:   The following treatment plan was discussed    1. Umbilical hernia without obstruction and without gangrene  We discussed referral to general surgery for repair.  ER precautions given for signs and symptoms of incarceration  - Referral to General Surgery    2. Need for vaccination  - INFLUENZA VACCINE, HIGH DOSE (65+ ONLY)    3. Prediabetes  - HEMOGLOBIN A1C; Future    4. Other hyperlipidemia  - Lipid Profile; Future  - atorvastatin (LIPITOR) 40 MG Tab; Take 1 Tablet by mouth every day.  Dispense: 30 Tablet; Refill: 11    5. Essential hypertension  Stable, controlled with current medications which were refilled today  - amLODIPine (NORVASC) 10 MG Tab; Take 1 Tablet by mouth every day.  Dispense: 90 Tablet; Refill: 0    6. Normocytic anemia  Will obtain updated labs and then decide whether he needs ongoing iron supplementation  - FERRITIN; Future  - CBC WITHOUT DIFFERENTIAL; Future  - IRON/TOTAL IRON BIND; Future    7. Anxiety  Stable, controlled with current medications which were refilled today  - sertraline (ZOLOFT) 100 MG Tab; Take 1 Tablet by mouth every day.  Dispense: 30 Tablet; Refill: 5    8. Insomnia, unspecified  type  Stable, controlled with current medications which were refilled today  - amitriptyline (ELAVIL) 25 MG Tab; TAKE 1 TABLET BY MOUTH AT BEDTIME AS NEEDED FOR SLEEP (INCREASED DOSE)  Dispense: 30 Tablet; Refill: 5    9. Right rotator cuff tendonitis  Subacromial bursa injection done in clinic  - triamcinolone acetonide (KENALOG-40) injection 40 mg  - lidocaine PF (XYLOCAINE-MPF) 2 % injection PF 5 mL    10. Methamphetamine abuse in remission ([facility])  Stable, continue to monitor    11. Chronic skin ulcer of lower leg ([facility])  Improving.  He will follow-up with wound care  - meloxicam (MOBIC) 7.5 MG Tab; TAKE 1 TABLET BY MOUTH ONCE DAILY AS NEEDED FOR MODERATE PAIN  Dispense: 30 Tablet; Refill: 3    HCC Gap Form    Diagnosis to address: I73.9 - Peripheral artery disease (HCC)  Assessment and plan: Chronic, stable. Continue with current defined treatment plan: aspirin daily. Follow-up at least annually.  Diagnosis: L97.909 - Chronic skin ulcer of lower leg (HCC)  Assessment and plan: Chronic, improving. Continue with current defined treatment plan: wound care with weekly follow up  Diagnosis: I27.20 - Pulmonary hypertension (HCC)  Assessment and plan: Chronic, stable, as based on today's assessment and impact on other conditions evaluated today.  Follow-up with specialist as directed, but at least annually.  Last edited 09/22/22 14:36 PDT by Marcela Ch M.D.           Followup: Return in about 4 months (around 1/22/2023).

## 2022-09-22 NOTE — ASSESSMENT & PLAN NOTE
He states that his right shoulder has started to bother him again similar to in the past and we have done subacromial bursa injections for him which have alleviated his pain.  He reports his range of motion is starting to decrease and he is having trouble lifting items with his right hand due to the pain.  He is interested in repeating an injection today.

## 2022-09-22 NOTE — ASSESSMENT & PLAN NOTE
Last labs were checked about a year ago.  He continues on supplemental iron and vitamin C daily.  Denies melena or hematochezia.

## 2022-09-22 NOTE — ASSESSMENT & PLAN NOTE
According to patient's daughter, he was recently hospitalized at a facility called [facility] for 21 days where they did extensive wound care and antibiotic therapy.  She states that his right leg has completely healed up and his left leg ulcers are looking much better.  He is still receiving regular wound care.

## 2022-09-22 NOTE — ASSESSMENT & PLAN NOTE
He noticed an umbilical hernia about 6 months ago.  He states that it is sometimes painful.  He is not sure how it began.  He is interested in having it surgically treated.

## 2022-09-23 DIAGNOSIS — D64.9 NORMOCYTIC ANEMIA: ICD-10-CM

## 2022-09-23 LAB
CHOLEST SERPL-MCNC: 112 MG/DL (ref 100–199)
ERYTHROCYTE [DISTWIDTH] IN BLOOD BY AUTOMATED COUNT: 13.7 % (ref 11.6–15.4)
FERRITIN SERPL-MCNC: 84 NG/ML (ref 30–400)
HBA1C MFR BLD: 5.6 % (ref 4.8–5.6)
HCT VFR BLD AUTO: 37.5 % (ref 37.5–51)
HDLC SERPL-MCNC: 50 MG/DL
HGB BLD-MCNC: 12.5 G/DL (ref 13–17.7)
IRON SATN MFR SERPL: 17 % (ref 15–55)
IRON SERPL-MCNC: 55 UG/DL (ref 38–169)
LABORATORY COMMENT REPORT: NORMAL
LDLC SERPL CALC-MCNC: 51 MG/DL (ref 0–99)
MCH RBC QN AUTO: 30.4 PG (ref 26.6–33)
MCHC RBC AUTO-ENTMCNC: 33.3 G/DL (ref 31.5–35.7)
MCV RBC AUTO: 91 FL (ref 79–97)
NRBC BLD AUTO-RTO: ABNORMAL %
PLATELET # BLD AUTO: 213 X10E3/UL (ref 150–450)
RBC # BLD AUTO: 4.11 X10E6/UL (ref 4.14–5.8)
TIBC SERPL-MCNC: 326 UG/DL (ref 250–450)
TRIGL SERPL-MCNC: 42 MG/DL (ref 0–149)
UIBC SERPL-MCNC: 271 UG/DL (ref 111–343)
VLDLC SERPL CALC-MCNC: 11 MG/DL (ref 5–40)
WBC # BLD AUTO: 5.9 X10E3/UL (ref 3.4–10.8)

## 2022-09-23 RX ORDER — ASCORBIC ACID 500 MG
500 TABLET ORAL DAILY
Qty: 30 TABLET | Refills: 5 | Status: SHIPPED | OUTPATIENT
Start: 2022-09-23 | End: 2022-09-23 | Stop reason: SDUPTHER

## 2022-09-23 RX ORDER — FERROUS GLUCONATE 324(38)MG
324 TABLET ORAL
Qty: 30 TABLET | Refills: 5 | Status: SHIPPED | OUTPATIENT
Start: 2022-09-23 | End: 2023-04-04

## 2022-09-23 RX ORDER — ASCORBIC ACID 500 MG
500 TABLET ORAL DAILY
Qty: 30 TABLET | Refills: 5 | Status: SHIPPED | OUTPATIENT
Start: 2022-09-23 | End: 2023-04-04

## 2022-09-23 RX ORDER — FERROUS GLUCONATE 324(38)MG
324 TABLET ORAL
Qty: 30 TABLET | Refills: 5 | Status: SHIPPED | OUTPATIENT
Start: 2022-09-23 | End: 2022-09-23 | Stop reason: SDUPTHER
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
## 2022-10-20 ENCOUNTER — OFFICE VISIT (OUTPATIENT)
Dept: CARDIOLOGY | Facility: MEDICAL CENTER | Age: 67
End: 2022-10-20
Payer: MEDICARE

## 2022-10-20 VITALS
BODY MASS INDEX: 23.7 KG/M2 | OXYGEN SATURATION: 96 % | HEART RATE: 62 BPM | RESPIRATION RATE: 14 BRPM | DIASTOLIC BLOOD PRESSURE: 72 MMHG | SYSTOLIC BLOOD PRESSURE: 128 MMHG | WEIGHT: 175 LBS | HEIGHT: 72 IN

## 2022-10-20 DIAGNOSIS — S81.802D WOUND OF LEFT LOWER EXTREMITY, SUBSEQUENT ENCOUNTER: ICD-10-CM

## 2022-10-20 DIAGNOSIS — E78.5 DYSLIPIDEMIA: ICD-10-CM

## 2022-10-20 DIAGNOSIS — I73.9 PERIPHERAL ARTERY DISEASE (HCC): ICD-10-CM

## 2022-10-20 DIAGNOSIS — I10 ESSENTIAL HYPERTENSION: ICD-10-CM

## 2022-10-20 PROBLEM — E78.49 OTHER HYPERLIPIDEMIA: Status: RESOLVED | Noted: 2018-12-19 | Resolved: 2022-10-20

## 2022-10-20 PROCEDURE — 99214 OFFICE O/P EST MOD 30 MIN: CPT | Performed by: INTERNAL MEDICINE

## 2022-10-20 RX ORDER — TRIAMCINOLONE ACETONIDE 40 MG/ML
INJECTION, SUSPENSION INTRA-ARTICULAR; INTRAMUSCULAR
COMMUNITY
Start: 2022-09-22 | End: 2022-10-20

## 2022-10-20 ASSESSMENT — FIBROSIS 4 INDEX: FIB4 SCORE: 2.49

## 2022-10-20 NOTE — PROGRESS NOTES
Cardiology Follow-up Consultation Note    Date of note:    10/20/2022    Primary Care Provider: Marcela Ch M.D.    Name:             Goran Chavez   YOB: 1955  MRN:               [id number]    Chief Complaint   Patient presents with    Chest Pain       HISTORY OF PRESENT ILLNESS  Mr. Goran Chavez is a 67 y.o. male who returns to see us for follow-up.  Is here with his daughter.    Pertinent history:  Essential hypertension  Dyslipidemia  Chronic hep C  Polysubstance abuse with methamphetamine, now in remission    Last clinic visit: 8/31/2021    Interim History:  Since his last visit, has been doing well from a cardiovascular perspective.  Lower extremity wounds initially healed but started to reappear on left lower extremity.  Is actively working with wound clinic.  He is wearing bilateral compression socks which helps with the swelling and wounds.      Past Medical History:   Diagnosis Date    Anxiety     Charcot's joint of left foot, non-diabetic 3/21/2016    Hepatitis C, chronic (HCC)     Hypertension     Kidney disease     Migraine     Polysubstance abuse (HCC) 3/8/2018    Tobacco use 4/18/2016    Ulcer of left lower extremity with necrosis of muscle (HCC) 3/21/2016    Venous stasis ulcer (HCC) 2017    bilateral lower extremity         Past Surgical History:   Procedure Laterality Date    IRRIGATION & DEBRIDEMENT ORTHO Left 5/4/2020    Procedure: IRRIGATION AND DEBRIDEMENT, WOUND - LEG;  Surgeon: Barrie Israel M.D.;  Location: [facility];  Service: Orthopedics    IRRIGATION & DEBRIDEMENT ORTHO Left 4/27/2020    Procedure: IRRIGATION AND DEBRIDEMENT, WOUND - LEG;  Surgeon: Pasquale John M.D.;  Location: [facility];  Service: Orthopedics    IRRIGATION & DEBRIDEMENT ORTHO Left 4/24/2020    Procedure: IRRIGATION AND DEBRIDEMENT, WOUND-FOOT;  Surgeon: Barrie Israel M.D.;  Location: [facility];  Service: Orthopedics     IRRIGATION & DEBRIDEMENT ORTHO Left 2020    Procedure: IRRIGATION AND DEBRIDEMENT, WOUND - LEG;  Surgeon: Barrie Israel M.D.;  Location: [facility];  Service: Orthopedics    ORIF, FRACTURE, TIBIA Right     ORIF, SHOULDER Left     HAND SURGERY Left     due to infection    NH DRAIN SKIN ABSCESS SIMPLE Left     leg, near the knee, remote         Current Outpatient Medications   Medication Sig Dispense Refill    ferrous gluconate (FERGON) 324 (38 Fe) MG Tab Take 1 Tablet by mouth every morning with breakfast. 30 Tablet 5    ascorbic acid (VITAMIN C) 500 MG tablet Take 1 Tablet by mouth every day. 30 Tablet 5    atorvastatin (LIPITOR) 40 MG Tab Take 1 Tablet by mouth every day. 30 Tablet 11    amLODIPine (NORVASC) 10 MG Tab Take 1 Tablet by mouth every day. 90 Tablet 0    sertraline (ZOLOFT) 100 MG Tab Take 1 Tablet by mouth every day. 30 Tablet 5    amitriptyline (ELAVIL) 25 MG Tab TAKE 1 TABLET BY MOUTH AT BEDTIME AS NEEDED FOR SLEEP (INCREASED DOSE) 30 Tablet 5    meloxicam (MOBIC) 7.5 MG Tab TAKE 1 TABLET BY MOUTH ONCE DAILY AS NEEDED FOR MODERATE PAIN 30 Tablet 3    ketoconazole (NIZORAL) 2 % Cream APPLY CREAM TOPICALLY TO AFFECTED AREA TWICE DAILY TO FEET AND TOENAILS      aspirin EC (ECOTRIN) 81 MG Tablet Delayed Response Take 1 Tablet by mouth every day. 90 Tablet 3    gabapentin (NEURONTIN) 300 MG Cap Take 300 mg by mouth 3 times a day. (Patient not taking: Reported on 10/20/2022)       No current facility-administered medications for this visit.         No Known Allergies      Family History   Problem Relation Age of Onset    Lung Disease Mother 70         from COPD    Hypertension Mother     Other Father 82         from brain aneurysm after fall    Cancer Neg Hx     Heart Disease Neg Hx     Stroke Neg Hx     Diabetes Neg Hx          Social History     Socioeconomic History    Marital status: Legally      Spouse name: Not on file    Number of children: Not on file     Years of education: Not on file    Highest education level: Not on file   Occupational History    Not on file   Tobacco Use    Smoking status: Former     Packs/day: 0.00     Years: 48.00     Pack years: 0.00     Types: Cigarettes     Quit date: 12/5/2018     Years since quitting: 3.8    Smokeless tobacco: Never    Tobacco comments:     states he is using 7mg nicotine patch   Vaping Use    Vaping Use: Never used   Substance and Sexual Activity    Alcohol use: No     Alcohol/week: 7.2 oz     Types: 12 Cans of beer per week     Comment: history of alcoholism     Drug use: Yes     Types: Marijuana, Inhaled, Methamphetamines     Comment: MJ every day, intermittent meth use    Sexual activity: Not on file   Other Topics Concern    Not on file   Social History Narrative    Not on file     Social Determinants of Health     Financial Resource Strain: Not on file   Food Insecurity: Not on file   Transportation Needs: Not on file   Physical Activity: Not on file   Stress: Not on file   Social Connections: Not on file   Intimate Partner Violence: Not on file   Housing Stability: Not on file         Physical Exam:  Ambulatory Vitals  /72 (BP Location: Left arm, Patient Position: Sitting, BP Cuff Size: Adult)   Pulse 62   Resp 14   Ht 1.829 m (6')   Wt 79.4 kg (175 lb)   SpO2 96%    Oxygen Therapy:  Pulse Oximetry: 96 %  BP Readings from Last 4 Encounters:   10/20/22 128/72   09/22/22 122/82   08/31/21 110/68   08/04/21 123/77       Weight/BMI: Body mass index is 23.73 kg/m².  Wt Readings from Last 4 Encounters:   10/20/22 79.4 kg (175 lb)   09/22/22 79.8 kg (176 lb)   08/31/21 71.7 kg (158 lb)   07/29/21 76.7 kg (169 lb)       GEN: Well developed, well nourished and in no acute distress.  HEART: no significant JVD, regular rate and rhythm, normal S1 and S2, no murmurs, no third heart sounds, normal cardiac palpation  LUNG: clear to auscultation bilaterally, no wheezing, no crackles, normal respiratory effort on  room air  ABDOMEN: soft, non-tender, non-distended, normal bowel sounds throughout  EXTREMITIES: Bilateral lower extremity in compression socks.  Mild left ankle edema  VASCULAR: no significantly elevated jugular venous pressure, no carotid bruits noted, radial pulses 2+ and equal      Lab Data Review:  Lab Results   Component Value Date/Time    CHOLSTRLTOT 112 09/22/2022 11:00 AM    CHOLSTRLTOT 109 07/29/2021 03:21 PM    LDL 64 07/29/2021 03:21 PM    HDL 50 09/22/2022 11:00 AM    HDL 30 (A) 07/29/2021 03:21 PM    TRIGLYCERIDE 42 09/22/2022 11:00 AM    TRIGLYCERIDE 77 07/29/2021 03:21 PM       Lab Results   Component Value Date/Time    SODIUM 139 07/29/2021 03:21 PM    POTASSIUM 3.6 07/29/2021 03:21 PM    CHLORIDE 106 07/29/2021 03:21 PM    CO2 20 07/29/2021 03:21 PM    GLUCOSE 106 (H) 07/29/2021 03:21 PM    BUN 20 07/29/2021 03:21 PM    CREATININE 1.14 07/29/2021 03:21 PM     Lab Results   Component Value Date/Time    ALKPHOSPHAT 105 (H) 07/29/2021 03:21 PM    ASTSGOT 38 07/29/2021 03:21 PM    ALTSGPT 23 07/29/2021 03:21 PM    TBILIRUBIN 0.5 07/29/2021 03:21 PM      Lab Results   Component Value Date/Time    WBC 5.9 09/22/2022 11:00 AM    WBC 9.3 07/29/2021 03:21 PM     Lab Results   Component Value Date/Time    HBA1C 5.6 09/22/2022 11:00 AM    HBA1C 5.5 07/29/2021 03:21 PM    HBA1C 5.7 (H) 01/23/2020 11:22 AM       Cardiac Imaging and Procedures Review:    EKG dated 7/29/2021: My personal interpretation is sinus tachycardia,  bpm    Echo dated 2019:   Normal left ventricular size and function.  LVEF 55%, RVSP 39 mmHg    Nuclear Perfusion Imaging (2/22/2022):   No evidence of ischemia or infarction      Radiology test Review:  CTA aorta (4/12/2021): IMPRESSION:  1.  Prominent bilateral common femoral artery calcified plaque with greater than 50% diameter stenosis.  2.  Otherwise patent bilateral lower extremities with patent three-vessel runoff.  3.  Bilateral leg edema and/or cellulitis.  4.  Trace bilateral  pleural effusions.  5.  Mild ascites.  6.  Bilateral renal cortical scarring.  7.  Enlarged iliac and inguinal lymph nodes which are nonspecific. This could be reactive however cannot exclude malignancy. Suggest follow-up.    US Bilateral BHUPINDER 4/12/2021:  No evidence of major arterial disease.      Assessment & Plan     1. Essential hypertension  Referral to Vascular Medicine      2. Peripheral artery disease (HCC)  Referral to Vascular Medicine      3. Dyslipidemia        4. Wound of left lower extremity, subsequent encounter  Referral to Vascular Medicine          Doing well from a cardiovascular perspective.  No recurrent episodes of chest pain or pressure which is reassuring.    Blood pressure well controlled.  Continue amlodipine.    For peripheral arterial disease, venous insufficiency and lower extremity wound, referral provided to vascular medicine to establish care.  Was previously followed by [facility] [place] and is now working with wound clinic.  We discussed that CT aorta from April 2021 showed common femoral artery stenosis.  At that time, vascular surgery was consulted and recommended conservative management.  No ongoing symptoms concerning for critical stenosis.  Continue Lipitor and aspirin.      All of patient and his daughter's excellent questions were answered to the best of my knowledge and to their satisfaction.  It was a pleasure seeing Mr. Goran Chavez in my clinic today. Return in about 1 year (around 10/20/2023). Patient is aware to call the cardiology clinic with any questions or concerns.      Trevor Styles MD  [facility]  [place] [facility]  [address] [address]  Phone: [phone]  Fax: [phone]    Please note that this dictation was created using voice recognition software. I have made every reasonable attempt to correct obvious errors, but it is possible there are errors of grammar and possibly  content that I did not discover before finalizing the note.

## 2022-11-08 ENCOUNTER — APPOINTMENT (RX ONLY)
Dept: URBAN - METROPOLITAN AREA CLINIC 4 | Facility: CLINIC | Age: 67
Setting detail: DERMATOLOGY
End: 2022-11-08

## 2022-11-08 ENCOUNTER — RX ONLY (OUTPATIENT)
Age: 67
Setting detail: RX ONLY
End: 2022-11-08

## 2022-11-08 DIAGNOSIS — L98419 CHRONIC ULCER OF OTHER SPECIFIED SITES: ICD-10-CM

## 2022-11-08 DIAGNOSIS — L98429 CHRONIC ULCER OF OTHER SPECIFIED SITES: ICD-10-CM

## 2022-11-08 PROBLEM — L97.829 NON-PRESSURE CHRONIC ULCER OF OTHER PART OF LEFT LOWER LEG WITH UNSPECIFIED SEVERITY: Status: ACTIVE | Noted: 2022-11-08

## 2022-11-08 PROCEDURE — ? ADDITIONAL NOTES

## 2022-11-08 PROCEDURE — ? COUNSELING

## 2022-11-08 PROCEDURE — 99203 OFFICE O/P NEW LOW 30 MIN: CPT

## 2022-11-08 RX ORDER — TRIAMCINOLONE ACETONIDE 1 MG/G
CREAM TOPICAL
Qty: 453.6 | Refills: 0 | Status: ERX | COMMUNITY
Start: 2022-11-08

## 2022-11-08 ASSESSMENT — LOCATION ZONE DERM: LOCATION ZONE: LEG

## 2022-11-08 ASSESSMENT — LOCATION DETAILED DESCRIPTION DERM: LOCATION DETAILED: LEFT DISTAL PRETIBIAL REGION

## 2022-11-08 ASSESSMENT — LOCATION SIMPLE DESCRIPTION DERM: LOCATION SIMPLE: LEFT PRETIBIAL REGION

## 2022-11-08 NOTE — HPI: SKIN LESIONS
Is This A New Presentation, Or A Follow-Up?: Skin Lesions
How Severe Is Your Skin Lesion?: mild
Have Your Skin Lesions Been Treated?: not been treated
Additional History: Pt has done multiple short term tx’s including antibiotics.

## 2022-11-08 NOTE — PROCEDURE: MIPS QUALITY
Quality 111:Pneumonia Vaccination Status For Older Adults: Pneumococcal vaccine (PPSV23) administered on or after patient’s 60th birthday and before the end of the measurement period
Quality 226: Preventive Care And Screening: Tobacco Use: Screening And Cessation Intervention: Patient screened for tobacco use and is an ex/non-smoker
Quality 130: Documentation Of Current Medications In The Medical Record: Current Medications Documented
Quality 431: Preventive Care And Screening: Unhealthy Alcohol Use - Screening: Patient not identified as an unhealthy alcohol user when screened for unhealthy alcohol use using a systematic screening method
Detail Level: Detailed
Quality 110: Preventive Care And Screening: Influenza Immunization: Influenza Immunization Administered during Influenza season

## 2022-11-08 NOTE — PROCEDURE: ADDITIONAL NOTES
Render Risk Assessment In Note?: no
Detail Level: Simple
Additional Notes: Discussed with the patient that I am uncertain of the cause of his ulcers; differential is broad. Discussed typical work-up for ulcers of the legs of uncertain etiology; they are scheduled to see vascular in the next few weeks. Discussed that I see no clear signs of infection and that swab cultures often will grow contaminate bacteria due to the open nature of the wounds. His lack of other comorbid conditions(RA,IBD) mitigates against PG though obviously still a concern. Discussed that I am unsure of where the prior biopsy was performed though the current pathology does not lend to establishing a cause for the ulcers; TAC was provided for use around the wounds for the peripheral dermatitis. Asked that they have his records to sent to us so we can determine what tests have been done and what needs to be completed. To contact me if any questions or problems. To contact me if they have not heard from me in the next week regarding our receiving his records

## 2022-12-22 ENCOUNTER — APPOINTMENT (RX ONLY)
Dept: URBAN - METROPOLITAN AREA CLINIC 4 | Facility: CLINIC | Age: 67
Setting detail: DERMATOLOGY
End: 2022-12-22

## 2022-12-22 ENCOUNTER — HOSPITAL ENCOUNTER (OUTPATIENT)
Facility: MEDICAL CENTER | Age: 67
End: 2022-12-22
Attending: PHYSICIAN ASSISTANT
Payer: MEDICARE

## 2022-12-22 DIAGNOSIS — L98429 CHRONIC ULCER OF OTHER SPECIFIED SITES: ICD-10-CM

## 2022-12-22 DIAGNOSIS — A31.1 CUTANEOUS MYCOBACTERIAL INFECTION: ICD-10-CM

## 2022-12-22 DIAGNOSIS — L98419 CHRONIC ULCER OF OTHER SPECIFIED SITES: ICD-10-CM

## 2022-12-22 PROBLEM — L97.829 NON-PRESSURE CHRONIC ULCER OF OTHER PART OF LEFT LOWER LEG WITH UNSPECIFIED SEVERITY: Status: ACTIVE | Noted: 2022-12-22

## 2022-12-22 PROBLEM — L08.9 LOCAL INFECTION OF THE SKIN AND SUBCUTANEOUS TISSUE, UNSPECIFIED: Status: ACTIVE | Noted: 2022-12-22

## 2022-12-22 LAB
AMBIGUOUS DTTM AMBI4: NORMAL
SIGNIFICANT IND 70042: NORMAL
SITE SITE: NORMAL
SOURCE SOURCE: NORMAL

## 2022-12-22 PROCEDURE — 11105 PUNCH BX SKIN EA SEP/ADDL: CPT

## 2022-12-22 PROCEDURE — 87205 SMEAR GRAM STAIN: CPT

## 2022-12-22 PROCEDURE — 87116 MYCOBACTERIA CULTURE: CPT

## 2022-12-22 PROCEDURE — ? COUNSELING

## 2022-12-22 PROCEDURE — 87206 SMEAR FLUORESCENT/ACID STAI: CPT

## 2022-12-22 PROCEDURE — ? ORDER TESTS

## 2022-12-22 PROCEDURE — ? BIOPSY BY PUNCH METHOD

## 2022-12-22 PROCEDURE — ? PUNCH BIOPSY FOR TISSUE CULTURE

## 2022-12-22 PROCEDURE — 87015 SPECIMEN INFECT AGNT CONCNTJ: CPT

## 2022-12-22 PROCEDURE — 11104 PUNCH BX SKIN SINGLE LESION: CPT

## 2022-12-22 PROCEDURE — 87102 FUNGUS ISOLATION CULTURE: CPT

## 2022-12-22 ASSESSMENT — LOCATION DETAILED DESCRIPTION DERM
LOCATION DETAILED: LEFT LATERAL DISTAL PRETIBIAL REGION
LOCATION DETAILED: LEFT LATERAL PROXIMAL PRETIBIAL REGION
LOCATION DETAILED: LEFT DISTAL PRETIBIAL REGION

## 2022-12-22 ASSESSMENT — LOCATION SIMPLE DESCRIPTION DERM: LOCATION SIMPLE: LEFT PRETIBIAL REGION

## 2022-12-22 ASSESSMENT — LOCATION ZONE DERM: LOCATION ZONE: LEG

## 2022-12-22 NOTE — PROCEDURE: PUNCH BIOPSY FOR TISSUE CULTURE
Procedure Information: This plan will only bill for the biopsy.  The individual tests must be ordered in the 'Order Test' plan.  Bacterial Tissue Cultures: 20474-3.  Fungal Tissue Cultures:  578-5.  Mycobacterium Tissue Cultures: 540-5.
Detail Level: Detailed
Size Of Lesion In Cm (Optional): 0
Anesthesia Type: 0.5% lidocaine without epinephrine
Anesthesia Volume In Cc: 0.5
Hemostasis: None
Wound Care: Petrolatum
Dressing: bandage
Punch Size In Mm: 3
Depth Of Punch Biopsy: dermis
Epidermal Sutures: gel foam
Suture Removal: 14 days
Patient Will Remove Sutures At Home?: No
Consent: Written consent was obtained and risks were reviewed including but not limited to scarring, infection, bleeding, scabbing, nerve damage and allergy to anesthesia.
Post-Care Instructions: I reviewed with the patient in detail post-care instructions. Patient is to keep the biopsy site dry overnight, and then apply bacitracin twice daily until healed. Patient may apply hydrogen peroxide soaks to remove any crusting.
Home Suture Removal Text: Patient was provided a home suture removal kit and will remove their sutures at home.  If they have any questions or difficulties they will call the office.
Notification Instructions: Patient will be notified of culture results when they are available. Patient was informed that tissue cultures can take some time to complete and that we will inform them when we get a result.

## 2022-12-22 NOTE — PROCEDURE: ORDER TESTS
Billing Type: Third-Party Bill
Performing Laboratory: 0
Expected Date Of Service: 12/22/2022
Bill For Surgical Tray: no

## 2022-12-22 NOTE — PROCEDURE: BIOPSY BY PUNCH METHOD
Detail Level: Detailed
Was A Bandage Applied: Yes
Punch Size In Mm: 4
Size Of Lesion In Cm (Optional): 0
Depth Of Punch Biopsy: dermis
Biopsy Type: H and E
Anesthesia Type: 1% lidocaine with epinephrine
Anesthesia Volume In Cc: 0.5
Hemostasis: None
Epidermal Sutures: 4-0 Ethilon
Wound Care: Petrolatum
Dressing: bandage
Suture Removal: 14 days
Patient Will Remove Sutures At Home?: No
Lab: [id number]
Lab Facility: 
Consent: Written consent was obtained and risks were reviewed including but not limited to scarring, infection, bleeding, scabbing, incomplete removal, nerve damage and allergy to anesthesia.
Post-Care Instructions: I reviewed with the patient in detail post-care instructions. Patient is to keep the biopsy site dry overnight, and then apply bacitracin twice daily until healed. Patient may apply hydrogen peroxide soaks to remove any crusting.
Home Suture Removal Text: Patient was provided a home suture removal kit and will remove their sutures at home.  If they have any questions or difficulties they will call the office.
Notification Instructions: Patient will be notified of biopsy results. However, patient instructed to call the office if not contacted within 2 weeks.
Billing Type: Third-Party Bill
Information: Selecting Yes will display possible errors in your note based on the variables you have selected. This validation is only offered as a suggestion for you. PLEASE NOTE THAT THE VALIDATION TEXT WILL BE REMOVED WHEN YOU FINALIZE YOUR NOTE. IF YOU WANT TO FAX A PRELIMINARY NOTE YOU WILL NEED TO TOGGLE THIS TO 'NO' IF YOU DO NOT WANT IT IN YOUR FAXED NOTE.

## 2022-12-24 LAB
FUNGUS SPEC FUNGUS STN: NORMAL
RHODAMINE-AURAMINE STN SPEC: NORMAL
SIGNIFICANT IND 70042: NORMAL
SIGNIFICANT IND 70042: NORMAL
SITE SITE: NORMAL
SITE SITE: NORMAL
SOURCE SOURCE: NORMAL
SOURCE SOURCE: NORMAL

## 2023-01-17 LAB
FUNGUS SPEC CULT: NORMAL
FUNGUS SPEC FUNGUS STN: NORMAL
SIGNIFICANT IND 70042: NORMAL
SITE SITE: NORMAL
SOURCE SOURCE: NORMAL

## 2023-01-18 ENCOUNTER — TELEPHONE (OUTPATIENT)
Dept: MEDICAL GROUP | Facility: MEDICAL CENTER | Age: 68
End: 2023-01-18
Payer: MEDICARE

## 2023-01-19 NOTE — TELEPHONE ENCOUNTER
Danyel from [facility] stopped by the office, needs Home Health order sign for Goran.  She would like it faxed over to [phone]  Order given to MA.

## 2023-01-31 ENCOUNTER — DOCUMENTATION (OUTPATIENT)
Dept: VASCULAR LAB | Facility: MEDICAL CENTER | Age: 68
End: 2023-01-31
Payer: MEDICARE

## 2023-01-31 NOTE — PROGRESS NOTES
I called patient to reschedule appt.  Pt now has medicare advantage plan as of 01/01/2023.  Patient states they wanted to cancel their initial visit with Dr Bloch, and doesn't know if they are seeing a different vascular physician. Pt will need to be referred elsewhere.

## 2023-01-31 NOTE — PROGRESS NOTES
Based above conversation with our medical assistant, patient chose to cancel his appointment with vascular medicine does not reschedule    Pending further patient contact or recommendations from the remainder of his treatment team, we will defer all further cardiovascular care to PCP and cardiologist    Michael Bloch, MD  Vascular Medicine    Cc:   DHRUV Ch

## 2023-02-04 LAB
MYCOBACTERIUM SPEC CULT: NORMAL
RHODAMINE-AURAMINE STN SPEC: NORMAL
SIGNIFICANT IND 70042: NORMAL
SITE SITE: NORMAL
SOURCE SOURCE: NORMAL

## 2023-02-21 RX ADMIN — CLOBETASOL PROPIONATE 1: 0.5 OINTMENT TOPICAL at 00:00

## 2023-02-22 ENCOUNTER — APPOINTMENT (RX ONLY)
Dept: URBAN - METROPOLITAN AREA CLINIC 4 | Facility: CLINIC | Age: 68
Setting detail: DERMATOLOGY
End: 2023-02-22

## 2023-02-22 DIAGNOSIS — L98419 CHRONIC ULCER OF OTHER SPECIFIED SITES: ICD-10-CM

## 2023-02-22 DIAGNOSIS — Z00.8 ENCOUNTER FOR OTHER GENERAL EXAMINATION: ICD-10-CM

## 2023-02-22 DIAGNOSIS — L98429 CHRONIC ULCER OF OTHER SPECIFIED SITES: ICD-10-CM

## 2023-02-22 PROBLEM — L97.829 NON-PRESSURE CHRONIC ULCER OF OTHER PART OF LEFT LOWER LEG WITH UNSPECIFIED SEVERITY: Status: ACTIVE | Noted: 2023-02-22

## 2023-02-22 PROCEDURE — ? REFERRAL CORRESPONDENCE

## 2023-02-22 PROCEDURE — ? PRESCRIPTION

## 2023-02-22 PROCEDURE — 99213 OFFICE O/P EST LOW 20 MIN: CPT

## 2023-02-22 PROCEDURE — ? ADDITIONAL NOTES

## 2023-02-22 PROCEDURE — ? COUNSELING

## 2023-02-22 PROCEDURE — ? PHOTO-DOCUMENTATION

## 2023-02-22 RX ORDER — CLOBETASOL PROPIONATE 0.5 MG/G
OINTMENT TOPICAL BID
Qty: 15 | Refills: 0 | Status: ERX | COMMUNITY
Start: 2023-02-21

## 2023-02-22 ASSESSMENT — LOCATION DETAILED DESCRIPTION DERM: LOCATION DETAILED: LEFT DISTAL PRETIBIAL REGION

## 2023-02-22 ASSESSMENT — LOCATION ZONE DERM: LOCATION ZONE: LEG

## 2023-02-22 ASSESSMENT — LOCATION SIMPLE DESCRIPTION DERM: LOCATION SIMPLE: LEFT PRETIBIAL REGION

## 2023-02-22 NOTE — PROCEDURE: ADDITIONAL NOTES
Detail Level: Simple
Render Risk Assessment In Note?: no
Additional Notes: Seen in conjunction with Dr. Mo today. We discussed the case. As the wounds appear to be improving, we decided to continue with the current course of wound care. Prior to this we were considering treatment for PG. The patient's daughter & patient were interested in seeing how a trial of clobetasol would work on one of the wounds and this was provided. Photographs taken. 1 month f/u

## 2023-02-27 DIAGNOSIS — I10 ESSENTIAL HYPERTENSION: ICD-10-CM

## 2023-02-28 RX ORDER — AMLODIPINE BESYLATE 10 MG/1
TABLET ORAL
Qty: 90 TABLET | Refills: 1 | Status: SHIPPED | OUTPATIENT
Start: 2023-02-28 | End: 2023-08-16

## 2023-03-01 NOTE — CARE PLAN
Problem: Communication  Goal: The ability to communicate needs accurately and effectively will improve  Outcome: PROGRESSING AS EXPECTED     Problem: Safety  Goal: Will remain free from falls  Outcome: PROGRESSING AS EXPECTED      Detail Level: Simple

## 2023-03-03 NOTE — PROGRESS NOTES
Pt reports pain is tolerable with prn pain medications. Pt denies n/v and remains afebrile. Wound vac remains in place, dressing to be changed tomorrow by wound care RN. Fall precautions maintained. Pt continues to have good appetite with adequate fluid intake.   - s/p OHT in 2/23/18  - continue home Toprol  mg PO QD and Losartan 100 mg PO QD  - continue home ASA 81 and  pravastatin 20 mg PO QD  - continue home pantoprazole 40 mg PO QD    - underwent annual RHC/EMBx with Dr. Carpenter in February 2022

## 2023-03-21 ENCOUNTER — APPOINTMENT (RX ONLY)
Dept: URBAN - METROPOLITAN AREA CLINIC 4 | Facility: CLINIC | Age: 68
Setting detail: DERMATOLOGY
End: 2023-03-21

## 2023-03-21 DIAGNOSIS — L98429 CHRONIC ULCER OF OTHER SPECIFIED SITES: ICD-10-CM

## 2023-03-21 DIAGNOSIS — L98419 CHRONIC ULCER OF OTHER SPECIFIED SITES: ICD-10-CM

## 2023-03-21 DIAGNOSIS — L88 PYODERMA GANGRENOSUM: ICD-10-CM

## 2023-03-21 PROBLEM — L97.829 NON-PRESSURE CHRONIC ULCER OF OTHER PART OF LEFT LOWER LEG WITH UNSPECIFIED SEVERITY: Status: ACTIVE | Noted: 2023-03-21

## 2023-03-21 PROCEDURE — ? PHOTO-DOCUMENTATION

## 2023-03-21 PROCEDURE — ? PRESCRIPTION

## 2023-03-21 PROCEDURE — 99214 OFFICE O/P EST MOD 30 MIN: CPT

## 2023-03-21 PROCEDURE — ? COUNSELING

## 2023-03-21 PROCEDURE — ? ORDER TESTS

## 2023-03-21 PROCEDURE — ? ADDITIONAL NOTES

## 2023-03-21 ASSESSMENT — LOCATION ZONE DERM: LOCATION ZONE: LEG

## 2023-03-21 ASSESSMENT — LOCATION SIMPLE DESCRIPTION DERM: LOCATION SIMPLE: LEFT PRETIBIAL REGION

## 2023-03-21 ASSESSMENT — LOCATION DETAILED DESCRIPTION DERM: LOCATION DETAILED: LEFT DISTAL PRETIBIAL REGION

## 2023-03-21 NOTE — PROCEDURE: ORDER TESTS
Billing Type: Third-Party Bill
Expected Date Of Service: 03/21/2023
Bill For Surgical Tray: no
Performing Laboratory: -[id number]
Lab Facility: 0

## 2023-03-21 NOTE — PROCEDURE: ADDITIONAL NOTES
Detail Level: Simple
Render Risk Assessment In Note?: no
Additional Notes: Discussed PG and it being a diagnosis of exclusion though the prior bx was not confirmative of this. Discussed potential side effects of Humira including risk of infections & malignancy. They wish to proceed with a trial of this. Applied for standard dosing and patient will return for injection training. Labs ordered; patient was treated and \"cured\" of HCV with GI not requiring f/u on this.

## 2023-03-29 RX ORDER — ADALIMUMAB 40MG/0.8ML
KIT SUBCUTANEOUS
Qty: 2 | Refills: 0 | Status: ERX | COMMUNITY
Start: 2023-03-29

## 2023-03-29 RX ADMIN — ADALIMUMAB: KIT at 00:00

## 2023-03-31 DIAGNOSIS — E78.49 OTHER HYPERLIPIDEMIA: ICD-10-CM

## 2023-03-31 NOTE — TELEPHONE ENCOUNTER
Received request via: Pharmacy    Was the patient seen in the last year in this department? Yes    Does the patient have an active prescription (recently filled or refills available) for medication(s) requested? No    Does the patient have [facility] and need 100 day supply (blood pressure, diabetes and cholesterol meds only)? Patient does not have [facility]    Pharmacy and insurance are requesting 100 day supply for Pt. Rx to show update

## 2023-04-03 DIAGNOSIS — D64.9 NORMOCYTIC ANEMIA: ICD-10-CM

## 2023-04-04 RX ORDER — ATORVASTATIN CALCIUM 40 MG/1
40 TABLET, FILM COATED ORAL DAILY
Qty: 100 TABLET | Refills: 2 | Status: SHIPPED | OUTPATIENT
Start: 2023-04-04

## 2023-04-04 RX ORDER — ASCORBIC ACID 500 MG
TABLET ORAL
Qty: 30 TABLET | Refills: 3 | Status: SHIPPED | OUTPATIENT
Start: 2023-04-04

## 2023-04-04 RX ORDER — FERROUS GLUCONATE 324(38)MG
TABLET ORAL
Qty: 30 TABLET | Refills: 3 | Status: SHIPPED | OUTPATIENT
Start: 2023-04-04

## 2023-04-04 NOTE — TELEPHONE ENCOUNTER
Received request via: Pharmacy    Was the patient seen in the last year in this department? Yes    Does the patient have an active prescription (recently filled or refills available) for medication(s) requested? No    [facility] and need 100 day supply (blood pressure, diabetes and cholesterol meds only)? N/a

## 2023-04-12 DIAGNOSIS — L97.909 CHRONIC SKIN ULCER OF LOWER LEG (HCC): ICD-10-CM

## 2023-04-13 RX ORDER — MELOXICAM 7.5 MG/1
TABLET ORAL
Qty: 30 TABLET | Refills: 1 | Status: SHIPPED | OUTPATIENT
Start: 2023-04-13 | End: 2023-06-27

## 2023-04-13 NOTE — TELEPHONE ENCOUNTER
Received request via: Pharmacy    Was the patient seen in the last year in this department? Yes    Does the patient have an active prescription (recently filled or refills available) for medication(s) requested? No    Does the patient have [facility] and need 100 day supply (blood pressure, diabetes and cholesterol meds only)? Patient does not have [facility]   No appt scheduled at this time

## 2023-04-26 ENCOUNTER — APPOINTMENT (RX ONLY)
Dept: URBAN - METROPOLITAN AREA CLINIC 4 | Facility: CLINIC | Age: 68
Setting detail: DERMATOLOGY
End: 2023-04-26

## 2023-04-26 DIAGNOSIS — L98429 CHRONIC ULCER OF OTHER SPECIFIED SITES: ICD-10-CM

## 2023-04-26 DIAGNOSIS — L98419 CHRONIC ULCER OF OTHER SPECIFIED SITES: ICD-10-CM

## 2023-04-26 PROBLEM — L97.829 NON-PRESSURE CHRONIC ULCER OF OTHER PART OF LEFT LOWER LEG WITH UNSPECIFIED SEVERITY: Status: ACTIVE | Noted: 2023-04-26

## 2023-04-26 PROBLEM — L97.819 NON-PRESSURE CHRONIC ULCER OF OTHER PART OF RIGHT LOWER LEG WITH UNSPECIFIED SEVERITY: Status: ACTIVE | Noted: 2023-04-26

## 2023-04-26 PROCEDURE — ? COUNSELING

## 2023-04-26 PROCEDURE — ? PRESCRIPTION

## 2023-04-26 PROCEDURE — ? ADDITIONAL NOTES

## 2023-04-26 PROCEDURE — 99213 OFFICE O/P EST LOW 20 MIN: CPT

## 2023-04-26 ASSESSMENT — LOCATION DETAILED DESCRIPTION DERM
LOCATION DETAILED: LEFT PROXIMAL PRETIBIAL REGION
LOCATION DETAILED: RIGHT PROXIMAL PRETIBIAL REGION

## 2023-04-26 ASSESSMENT — LOCATION SIMPLE DESCRIPTION DERM
LOCATION SIMPLE: LEFT PRETIBIAL REGION
LOCATION SIMPLE: RIGHT PRETIBIAL REGION

## 2023-04-26 ASSESSMENT — LOCATION ZONE DERM: LOCATION ZONE: LEG

## 2023-04-26 NOTE — PROCEDURE: ADDITIONAL NOTES
Render Risk Assessment In Note?: no
Detail Level: Simple
Additional Notes: Discussed that we should not start the Humira if he is needing IV antibiotics. He reports the swab cultures were performed due to new small ulcers on the left dorsal foot; denies any other changes in the wounds on his legs though odor has been an issue. I asked that he reach out to ID for clarification on the issue; we have discussed multiple times that his wounds will inevitably culture bacteria due to their open nature. I contacted ID this morning, but their office was closed and as a result left a message on their phone system asking for clarification on the issue; I left my cell # and instructed that the patient's daughter would be in touch today. Asked the daughter, at yesterday's visit, to contact me regarding the results of their discussion.

## 2023-04-27 RX ORDER — ADALIMUMAB 40MG/0.8ML
KIT SUBCUTANEOUS
Qty: 2 | Refills: 0 | Status: CANCELLED

## 2023-05-25 DIAGNOSIS — G47.00 INSOMNIA, UNSPECIFIED TYPE: ICD-10-CM

## 2023-05-26 RX ORDER — AMITRIPTYLINE HYDROCHLORIDE 25 MG/1
TABLET, FILM COATED ORAL
Qty: 30 TABLET | Refills: 3 | Status: SHIPPED | OUTPATIENT
Start: 2023-05-26

## 2023-05-26 NOTE — TELEPHONE ENCOUNTER
Received request via: Pharmacy    Was the patient seen in the last year in this department? Yes    Does the patient have an active prescription (recently filled or refills available) for medication(s) requested? No    Does the patient have [facility] and need 100 day supply (blood pressure, diabetes and cholesterol meds only)? Patient does not have [facility]   No appt scheduled at this time

## 2023-08-16 DIAGNOSIS — I10 ESSENTIAL HYPERTENSION: ICD-10-CM

## 2023-08-16 RX ORDER — AMLODIPINE BESYLATE 10 MG/1
TABLET ORAL
Qty: 90 TABLET | Refills: 0 | Status: SHIPPED | OUTPATIENT
Start: 2023-08-16

## 2024-06-05 ENCOUNTER — APPOINTMENT (RX ONLY)
Dept: URBAN - METROPOLITAN AREA CLINIC 4 | Facility: CLINIC | Age: 69
Setting detail: DERMATOLOGY
End: 2024-06-05

## 2024-06-05 DIAGNOSIS — D49.2 NEOPLASM OF UNSPECIFIED BEHAVIOR OF BONE, SOFT TISSUE, AND SKIN: ICD-10-CM

## 2024-06-05 PROCEDURE — 11102 TANGNTL BX SKIN SINGLE LES: CPT

## 2024-06-05 PROCEDURE — ? BIOPSY BY SHAVE METHOD

## 2024-06-05 PROCEDURE — ? COUNSELING

## 2024-06-05 ASSESSMENT — LOCATION SIMPLE DESCRIPTION DERM: LOCATION SIMPLE: RIGHT AXILLARY VAULT

## 2024-06-05 ASSESSMENT — LOCATION DETAILED DESCRIPTION DERM: LOCATION DETAILED: RIGHT AXILLARY VAULT

## 2024-06-05 ASSESSMENT — LOCATION ZONE DERM: LOCATION ZONE: AXILLAE

## 2024-07-01 NOTE — ANESTHESIA TIME REPORT
Anesthesia Start and Stop Event Times     Date Time Event    4/27/2020 2049 Ready for Procedure     2107 Anesthesia Start     2219 Anesthesia Stop        Responsible Staff  04/27/20    Name Role Begin End    Lisa Santos M.D. Anesth 2107 2219        Preop Diagnosis (Free Text):  Pre-op Diagnosis     Lef Lower Extremity Infection        Preop Diagnosis (Codes):    Post op Diagnosis  Cellulitis  with deep tissue tracking    Premium Reason  A. 3PM - 7AM    Comments:                                                                        Price (Use Numbers Only, No Special Characters Or $): 650.00 Detail Level: Detailed

## 2024-09-24 NOTE — CARE PLAN
Problem: Safety  Goal: Will remain free from falls  Outcome: PROGRESSING AS EXPECTED     Problem: Mobility  Goal: Risk for activity intolerance will decrease  Outcome: PROGRESSING AS EXPECTED     Problem: Respiratory:  Goal: Respiratory status will improve  Outcome: PROGRESSING AS EXPECTED      DISCHARGE

## 2024-10-10 NOTE — ASSESSMENT & PLAN NOTE
Continue Celexa  
Continue Norvasc and Inderal  Monitor blood pressure.  
Continue atorvastatin 40  
In process of workup from GI Dr Park -CT chest completed showing : right upper lobe nodular opacity with surrounding linear opacities, further evaluation with PET recommended vs serial CT Scan    MRI abdomen postponed as not compatible with wound vac, CT abdomen/pelvis on hold given recent contrast with CT chest.  AFP ordered.      
Infected left lower extremity venous stasis ulcer  Wound culture grows  Pseudomonas, MRSA, E faecalis and Klebsiella   IV vancomycin and IV Zosyn started on admission  ID consulted - continue zosyn - dc vanco and start zyvox.  Wound vac placed, outpatient wound vac being coordinated.  LPS consulting.  On ciprofloxacin and Zyvox.  end date 1/2/2019.  ID signed off.        
Maintaining hydration  Avoiding nephrotoxics  Renal dose all medications.    
Nicotine replacement has been provided  
Right upper lobe nodular opacity seen on CT scan of the chest which was seen on previous scans.  This is suspicious for scar tissue versus malignancy.  Outpatient PET scan recommended.  
front air bag/seat belt
